# Patient Record
Sex: MALE | Race: WHITE | NOT HISPANIC OR LATINO | Employment: OTHER | URBAN - METROPOLITAN AREA
[De-identification: names, ages, dates, MRNs, and addresses within clinical notes are randomized per-mention and may not be internally consistent; named-entity substitution may affect disease eponyms.]

---

## 2021-06-22 ENCOUNTER — HOSPITAL ENCOUNTER (INPATIENT)
Facility: HOSPITAL | Age: 76
LOS: 3 days | Discharge: HOME HEALTH | DRG: 292 | End: 2021-06-25
Attending: EMERGENCY MEDICINE | Admitting: INTERNAL MEDICINE
Payer: COMMERCIAL

## 2021-06-22 ENCOUNTER — APPOINTMENT (EMERGENCY)
Dept: RADIOLOGY | Facility: HOSPITAL | Age: 76
DRG: 292 | End: 2021-06-22
Payer: COMMERCIAL

## 2021-06-22 DIAGNOSIS — I50.9 ACUTE CONGESTIVE HEART FAILURE, UNSPECIFIED HEART FAILURE TYPE (HCC): Primary | ICD-10-CM

## 2021-06-22 DIAGNOSIS — I50.33 ACUTE ON CHRONIC DIASTOLIC CHF (CONGESTIVE HEART FAILURE) (HCC): ICD-10-CM

## 2021-06-22 DIAGNOSIS — I42.9 CARDIOMYOPATHY, UNSPECIFIED TYPE (HCC): ICD-10-CM

## 2021-06-22 PROBLEM — Z95.2 S/P AVR: Status: ACTIVE | Noted: 2021-06-22

## 2021-06-22 PROBLEM — D64.9 ANEMIA: Status: ACTIVE | Noted: 2021-06-22

## 2021-06-22 PROBLEM — R06.00 DYSPNEA: Status: ACTIVE | Noted: 2021-06-22

## 2021-06-22 PROBLEM — C61 PROSTATE CANCER (HCC): Status: ACTIVE | Noted: 2021-06-22

## 2021-06-22 LAB
ALBUMIN SERPL BCP-MCNC: 3.3 G/DL (ref 3.5–5)
ALP SERPL-CCNC: 72 U/L (ref 46–116)
ALT SERPL W P-5'-P-CCNC: 34 U/L (ref 12–78)
ANION GAP SERPL CALCULATED.3IONS-SCNC: 10 MMOL/L (ref 4–13)
APTT PPP: 30 SECONDS (ref 23–37)
AST SERPL W P-5'-P-CCNC: 14 U/L (ref 5–45)
ATRIAL RATE: 133 BPM
ATRIAL RATE: 88 BPM
BASOPHILS # BLD AUTO: 0.02 THOUSANDS/ΜL (ref 0–0.1)
BASOPHILS NFR BLD AUTO: 0 % (ref 0–1)
BILIRUB SERPL-MCNC: 1.05 MG/DL (ref 0.2–1)
BUN SERPL-MCNC: 8 MG/DL (ref 5–25)
CALCIUM ALBUM COR SERPL-MCNC: 9.6 MG/DL (ref 8.3–10.1)
CALCIUM SERPL-MCNC: 9 MG/DL (ref 8.3–10.1)
CHLORIDE SERPL-SCNC: 107 MMOL/L (ref 100–108)
CO2 SERPL-SCNC: 25 MMOL/L (ref 21–32)
CREAT SERPL-MCNC: 0.84 MG/DL (ref 0.6–1.3)
EOSINOPHIL # BLD AUTO: 0.21 THOUSAND/ΜL (ref 0–0.61)
EOSINOPHIL NFR BLD AUTO: 5 % (ref 0–6)
ERYTHROCYTE [DISTWIDTH] IN BLOOD BY AUTOMATED COUNT: 16.8 % (ref 11.6–15.1)
GFR SERPL CREATININE-BSD FRML MDRD: 86 ML/MIN/1.73SQ M
GLUCOSE SERPL-MCNC: 91 MG/DL (ref 65–140)
HCT VFR BLD AUTO: 31.1 % (ref 36.5–49.3)
HGB BLD-MCNC: 9.5 G/DL (ref 12–17)
IMM GRANULOCYTES # BLD AUTO: 0.03 THOUSAND/UL (ref 0–0.2)
IMM GRANULOCYTES NFR BLD AUTO: 1 % (ref 0–2)
INR PPP: 1.05 (ref 0.84–1.19)
LYMPHOCYTES # BLD AUTO: 0.68 THOUSANDS/ΜL (ref 0.6–4.47)
LYMPHOCYTES NFR BLD AUTO: 15 % (ref 14–44)
MCH RBC QN AUTO: 25.7 PG (ref 26.8–34.3)
MCHC RBC AUTO-ENTMCNC: 30.5 G/DL (ref 31.4–37.4)
MCV RBC AUTO: 84 FL (ref 82–98)
MONOCYTES # BLD AUTO: 0.49 THOUSAND/ΜL (ref 0.17–1.22)
MONOCYTES NFR BLD AUTO: 11 % (ref 4–12)
NEUTROPHILS # BLD AUTO: 3.13 THOUSANDS/ΜL (ref 1.85–7.62)
NEUTS SEG NFR BLD AUTO: 68 % (ref 43–75)
NRBC BLD AUTO-RTO: 0 /100 WBCS
NT-PROBNP SERPL-MCNC: 5157 PG/ML
P AXIS: 49 DEGREES
PLATELET # BLD AUTO: 220 THOUSANDS/UL (ref 149–390)
PMV BLD AUTO: 10.4 FL (ref 8.9–12.7)
POTASSIUM SERPL-SCNC: 3.9 MMOL/L (ref 3.5–5.3)
PR INTERVAL: 150 MS
PROT SERPL-MCNC: 6.3 G/DL (ref 6.4–8.2)
PROTHROMBIN TIME: 13.8 SECONDS (ref 11.6–14.5)
QRS AXIS: -28 DEGREES
QRS AXIS: 90 DEGREES
QRSD INTERVAL: 158 MS
QRSD INTERVAL: 166 MS
QT INTERVAL: 354 MS
QT INTERVAL: 400 MS
QTC INTERVAL: 454 MS
QTC INTERVAL: 484 MS
RBC # BLD AUTO: 3.69 MILLION/UL (ref 3.88–5.62)
SODIUM SERPL-SCNC: 142 MMOL/L (ref 136–145)
T WAVE AXIS: 150 DEGREES
T WAVE AXIS: 267 DEGREES
TROPONIN I SERPL-MCNC: 0.03 NG/ML
TROPONIN I SERPL-MCNC: 0.04 NG/ML
TROPONIN I SERPL-MCNC: 0.04 NG/ML
VENTRICULAR RATE: 88 BPM
VENTRICULAR RATE: 99 BPM
WBC # BLD AUTO: 4.56 THOUSAND/UL (ref 4.31–10.16)

## 2021-06-22 PROCEDURE — 99223 1ST HOSP IP/OBS HIGH 75: CPT | Performed by: INTERNAL MEDICINE

## 2021-06-22 PROCEDURE — 85730 THROMBOPLASTIN TIME PARTIAL: CPT | Performed by: EMERGENCY MEDICINE

## 2021-06-22 PROCEDURE — 99222 1ST HOSP IP/OBS MODERATE 55: CPT | Performed by: INTERNAL MEDICINE

## 2021-06-22 PROCEDURE — 93005 ELECTROCARDIOGRAM TRACING: CPT

## 2021-06-22 PROCEDURE — 84484 ASSAY OF TROPONIN QUANT: CPT | Performed by: INTERNAL MEDICINE

## 2021-06-22 PROCEDURE — 71045 X-RAY EXAM CHEST 1 VIEW: CPT

## 2021-06-22 PROCEDURE — 99285 EMERGENCY DEPT VISIT HI MDM: CPT | Performed by: EMERGENCY MEDICINE

## 2021-06-22 PROCEDURE — 85610 PROTHROMBIN TIME: CPT | Performed by: EMERGENCY MEDICINE

## 2021-06-22 PROCEDURE — 93010 ELECTROCARDIOGRAM REPORT: CPT | Performed by: INTERNAL MEDICINE

## 2021-06-22 PROCEDURE — 96374 THER/PROPH/DIAG INJ IV PUSH: CPT

## 2021-06-22 PROCEDURE — 36415 COLL VENOUS BLD VENIPUNCTURE: CPT | Performed by: EMERGENCY MEDICINE

## 2021-06-22 PROCEDURE — 83880 ASSAY OF NATRIURETIC PEPTIDE: CPT | Performed by: EMERGENCY MEDICINE

## 2021-06-22 PROCEDURE — 84484 ASSAY OF TROPONIN QUANT: CPT | Performed by: EMERGENCY MEDICINE

## 2021-06-22 PROCEDURE — 99285 EMERGENCY DEPT VISIT HI MDM: CPT

## 2021-06-22 PROCEDURE — 85025 COMPLETE CBC W/AUTO DIFF WBC: CPT | Performed by: EMERGENCY MEDICINE

## 2021-06-22 PROCEDURE — 80053 COMPREHEN METABOLIC PANEL: CPT | Performed by: EMERGENCY MEDICINE

## 2021-06-22 RX ORDER — ACETAMINOPHEN 325 MG/1
650 TABLET ORAL EVERY 6 HOURS PRN
Status: DISCONTINUED | OUTPATIENT
Start: 2021-06-22 | End: 2021-06-25 | Stop reason: HOSPADM

## 2021-06-22 RX ORDER — FUROSEMIDE 20 MG/1
TABLET ORAL
COMMUNITY
Start: 2021-06-15 | End: 2021-06-25 | Stop reason: HOSPADM

## 2021-06-22 RX ORDER — FUROSEMIDE 10 MG/ML
40 INJECTION INTRAMUSCULAR; INTRAVENOUS
Status: DISCONTINUED | OUTPATIENT
Start: 2021-06-22 | End: 2021-06-25 | Stop reason: HOSPADM

## 2021-06-22 RX ORDER — FUROSEMIDE 10 MG/ML
40 INJECTION INTRAMUSCULAR; INTRAVENOUS ONCE
Status: COMPLETED | OUTPATIENT
Start: 2021-06-22 | End: 2021-06-22

## 2021-06-22 RX ORDER — CLOPIDOGREL BISULFATE 75 MG/1
TABLET ORAL
COMMUNITY
Start: 2021-06-15 | End: 2021-10-06 | Stop reason: SDUPTHER

## 2021-06-22 RX ORDER — METOPROLOL SUCCINATE 25 MG/1
25 TABLET, EXTENDED RELEASE ORAL DAILY
Status: DISCONTINUED | OUTPATIENT
Start: 2021-06-22 | End: 2021-06-25 | Stop reason: HOSPADM

## 2021-06-22 RX ORDER — ATORVASTATIN CALCIUM 40 MG/1
80 TABLET, FILM COATED ORAL
Status: DISCONTINUED | OUTPATIENT
Start: 2021-06-22 | End: 2021-06-25 | Stop reason: HOSPADM

## 2021-06-22 RX ORDER — SPIRONOLACTONE 25 MG/1
25 TABLET ORAL DAILY
Status: DISCONTINUED | OUTPATIENT
Start: 2021-06-22 | End: 2021-06-25 | Stop reason: HOSPADM

## 2021-06-22 RX ORDER — SPIRONOLACTONE 25 MG/1
TABLET ORAL
COMMUNITY
Start: 2021-06-15 | End: 2021-07-06

## 2021-06-22 RX ORDER — CLOPIDOGREL BISULFATE 75 MG/1
75 TABLET ORAL DAILY
Status: DISCONTINUED | OUTPATIENT
Start: 2021-06-22 | End: 2021-06-25 | Stop reason: HOSPADM

## 2021-06-22 RX ADMIN — SACUBITRIL AND VALSARTAN 1 TABLET: 24; 26 TABLET, FILM COATED ORAL at 18:39

## 2021-06-22 RX ADMIN — SPIRONOLACTONE 25 MG: 25 TABLET, FILM COATED ORAL at 13:26

## 2021-06-22 RX ADMIN — METOPROLOL SUCCINATE 25 MG: 25 TABLET, EXTENDED RELEASE ORAL at 16:17

## 2021-06-22 RX ADMIN — ACETAMINOPHEN 650 MG: 325 TABLET, FILM COATED ORAL at 19:35

## 2021-06-22 RX ADMIN — ENOXAPARIN SODIUM 40 MG: 40 INJECTION SUBCUTANEOUS at 13:26

## 2021-06-22 RX ADMIN — CLOPIDOGREL BISULFATE 75 MG: 75 TABLET ORAL at 13:26

## 2021-06-22 RX ADMIN — FUROSEMIDE 40 MG: 10 INJECTION, SOLUTION INTRAMUSCULAR; INTRAVENOUS at 10:30

## 2021-06-22 RX ADMIN — ATORVASTATIN CALCIUM 80 MG: 40 TABLET, FILM COATED ORAL at 16:16

## 2021-06-22 RX ADMIN — FUROSEMIDE 40 MG: 10 INJECTION, SOLUTION INTRAMUSCULAR; INTRAVENOUS at 16:17

## 2021-06-22 NOTE — H&P
[facility]  H&PRebeca Renate Free 1945, 76 y o  male MRN: [id number]  Unit/Bed#: ED 15 Encounter: [id number]  Primary Care Provider: No primary care provider on file  Date and time admitted to hospital: 6/22/2021  8:31 AM    * Acute on chronic diastolic CHF (congestive heart failure) (HCC)  Assessment & Plan  Wt Readings from Last 3 Encounters:   06/22/21 104 kg (229 lb 4 5 oz)     No echo on our system  Reported EF of 20 % by ED  Will get repeat Echo   Lasix 40 mg BID  Cardiology consult   I/O  Daily weights          Anemia  Assessment & Plan  In the setting of metastatic cancer  Will check ferritin, TIBC  Transfuse as needed  S/P AVR  Assessment & Plan  S/p AVR in [place] more than a week ago  Did not receive his DC meds until 2 days ago  Will get records from [place]  Dyspnea  Assessment & Plan  In the setting of HF  Will treat with lasix and monitor response  Trend troponins as well  And monitor on telemetry for first 24 hours to rule out anginal equivalent  Prostate cancer [facility])  Assessment & Plan  Was receiving chemo every 4 weeks in [place] at [place]  Would like to establish care here  Will discuss with [facility] heme onc  Will get records from [place]  VTE Prophylaxis: Heparin  / sequential compression device   Code Status: Full Code  POLST: There is no POLST form on file for this patient (pre-hospital)  Discussion with family: Discussed with patient and with daughter at bedside    Anticipated Length of Stay:  Patient will be admitted on an Inpatient basis with an anticipated length of stay of  more than 2 midnights  Justification for Hospital Stay:  Acute CHF    Total Time for Visit, including Counseling / Coordination of Care: 45 minutes  Greater than 50% of this total time spent on direct patient counseling and coordination of care      Chief Complaint:   Shortness of breath    History of Present Illness:    Jules Barry is a 76 y o  male with metastatic prostate cancer, status post aortic valve replacement just over week ago, who presents worsening dyspnea over the last 2-3 days acutely worse today  His daughter states that he was discharged from [facility] says following his valve replacement but that his prescriptions including Lasix were sent to remote pharmacy and did not arrive until several days after his procedure  For this reason use not been taking all of his medication  He also reports 5 lb weight gain but no chest pain currently although he does note chest pain on exertion at times  He reports some mild leg swelling as well  With regard to his prostate cancer he was receiving chemotherapy every 4 weeks in [place] at [facility] until he started exhibiting symptoms of shortness of breath 3 weeks ago and was referred for aortic valve replacement  Since then he has not had any treatment  Overall the patient is looking for as much care to be transitioned to [facility] here in the [facility] including cancer therapy    Review of Systems:    Review of Systems   Constitutional: Negative for activity change, appetite change, chills, diaphoresis, fatigue, fever and unexpected weight change  HENT: Negative  Negative for congestion and drooling  Respiratory: Positive for chest tightness and shortness of breath  Cardiovascular: Positive for leg swelling  Gastrointestinal: Negative  Endocrine: Negative  Genitourinary: Negative  Musculoskeletal: Negative  Neurological: Negative  Hematological: Negative  Psychiatric/Behavioral: Negative  Past Medical and Surgical History:     Past Medical History:   Diagnosis Date    High cholesterol     Prostate cancer [facility])        Past Surgical History:   Procedure Laterality Date    CARDIAC SURGERY         Meds/Allergies:    Prior to Admission medications    Medication Sig Start Date End Date Taking?  Authorizing Provider   clopidogrel (PLAVIX) 75 mg tablet TAKE ONE TABLET BY MOUTH DAILY AS BLOOD THINNER 6/15/21  Yes Historical Provider, MD   furosemide (LASIX) 20 mg tablet TAKE ONE TABLET BY MOUTH DAILY TO PREVENT FLUID RETENTION 6/15/21  Yes Historical Provider, MD   spironolactone (ALDACTONE) 25 mg tablet TAKE ONE-HALF TABLET BY MOUTH DAILY FOR FLUID RETENTION OR HEART 6/15/21  Yes Historical Provider, MD     I have reviewed home medications with patient personally  Allergies: Not on File    Social History:     Marital Status:    Occupation:  Retired/better  Patient Pre-hospital Living Situation:  Lives with son  Patient Pre-hospital Level of Mobility:  Cane  Patient Pre-hospital Diet Restrictions:  None  Substance Use History:   Social History     Substance and Sexual Activity   Alcohol Use Not Currently     Social History     Tobacco Use   Smoking Status Former Smoker   Smokeless Tobacco Never Used     Social History     Substance and Sexual Activity   Drug Use Not on file       Family History:    History reviewed  No pertinent family history  Physical Exam:     Vitals:   Blood Pressure: 121/67 (06/22/21 0900)  Pulse: 90 (06/22/21 0900)  Temperature: 98 9 °F (37 2 °C) (06/22/21 0830)  Temp Source: Oral (06/22/21 0830)  Respirations: 20 (06/22/21 0900)  Height: 5' 7" (170 2 cm) (06/22/21 0830)  Weight - Scale: 104 kg (229 lb 4 5 oz) (06/22/21 0830)  SpO2: 95 % (06/22/21 0900)    Physical Exam  Constitutional:       General: He is not in acute distress  Appearance: He is not ill-appearing, toxic-appearing or diaphoretic  HENT:      Head: Normocephalic  Right Ear: Tympanic membrane normal  There is no impacted cerumen  Left Ear: There is no impacted cerumen  Nose: Nose normal       Mouth/Throat:      Mouth: Mucous membranes are moist       Pharynx: No oropharyngeal exudate or posterior oropharyngeal erythema  Eyes:      General: No scleral icterus  Right eye: No discharge  Left eye: No discharge        Pupils: Pupils are equal, round, and reactive to light  Cardiovascular:      Rate and Rhythm: Normal rate  Heart sounds: No murmur heard  No friction rub  No gallop  Pulmonary:      Effort: Pulmonary effort is normal  No respiratory distress (mild)  Breath sounds: No stridor  No wheezing, rhonchi or rales  Chest:      Chest wall: No tenderness  Abdominal:      General: Abdomen is flat  There is no distension  Palpations: There is no mass  Tenderness: There is no abdominal tenderness  There is no right CVA tenderness, left CVA tenderness, guarding or rebound  Hernia: No hernia is present  Musculoskeletal:         General: No swelling, tenderness, deformity or signs of injury  Normal range of motion  Right lower leg: No edema  Left lower leg: No edema  Skin:     Capillary Refill: Capillary refill takes less than 2 seconds  Coloration: Skin is pale  Skin is not jaundiced  Findings: No bruising, erythema, lesion or rash  Neurological:      General: No focal deficit present  Mental Status: He is alert  Cranial Nerves: No cranial nerve deficit  Sensory: No sensory deficit  Motor: No weakness  Coordination: Coordination normal       Gait: Gait normal       Deep Tendon Reflexes: Reflexes normal    Psychiatric:         Mood and Affect: Mood normal              Additional Data:     Lab Results: I have personally reviewed pertinent reports        Results from last 7 days   Lab Units 06/22/21  0926   WBC Thousand/uL 4 56   HEMOGLOBIN g/dL 9 5*   HEMATOCRIT % 31 1*   PLATELETS Thousands/uL 220   NEUTROS PCT % 68   LYMPHS PCT % 15   MONOS PCT % 11   EOS PCT % 5     Results from last 7 days   Lab Units 06/22/21  0926   SODIUM mmol/L 142   POTASSIUM mmol/L 3 9   CHLORIDE mmol/L 107   CO2 mmol/L 25   BUN mg/dL 8   CREATININE mg/dL 0 84   ANION GAP mmol/L 10   CALCIUM mg/dL 9 0   ALBUMIN g/dL 3 3*   TOTAL BILIRUBIN mg/dL 1 05*   ALK PHOS U/L 72   ALT U/L 34 AST U/L 14   GLUCOSE RANDOM mg/dL 91     Results from last 7 days   Lab Units 06/22/21  0926   INR  1 05                   Imaging: I have personally reviewed pertinent reports  XR chest 1 view portable   Final Result by Tavia Hollins MD (06/22 1011)      No acute cardiopulmonary disease  Workstation performed: [id number]             EKG, Pathology, and Other Studies Reviewed on Admission:   · EKG:  No acute ischemia    Allscripts / Epic Records Reviewed: Yes     ** Please Note: This note has been constructed using a voice recognition system   **

## 2021-06-22 NOTE — ASSESSMENT & PLAN NOTE
In the setting of HF  Will treat with lasix and monitor response  Trend troponins as well  And monitor on telemetry for first 24 hours to rule out anginal equivalent

## 2021-06-22 NOTE — ED ATTENDING ATTESTATION
6/22/2021  I, Romel Almendarez DO, saw and evaluated the patient  I have discussed the patient with the resident/non-physician practitioner and agree with the resident's/non-physician practitioner's findings, Plan of Care, and MDM as documented in the resident's/non-physician practitioner's note, except where noted  All available labs and Radiology studies were reviewed  I was present for key portions of any procedure(s) performed by the resident/non-physician practitioner and I was immediately available to provide assistance  At this point I agree with the current assessment done in the Emergency Department  I have conducted an independent evaluation of this patient a history and physical is as follows:      51-year-old male shortness of breath , recently discharged from hospital in [place] where he had aortic valve replacement period, unfortunate was unable to  medications for 4 days  , today noticed swelling of arms legs, and dyspnea with exertion  Can not more more than 2 ft without severe shortness of breath , not hypoxic here but with minimal exertion he becomes tachypneic, no conversational tachypnea  , lungs with rales at the bases  Given 40 mg of IV Lasix  Admitted to Internal Medicine Service            ED Course         Critical Care Time  Procedures

## 2021-06-22 NOTE — PLAN OF CARE
Problem: MOBILITY - ADULT  Goal: Maintain or return to baseline ADL function  Description: INTERVENTIONS:  -  Assess patient's ability to carry out ADLs; assess patient's baseline for ADL function and identify physical deficits which impact ability to perform ADLs (bathing, care of mouth/teeth, toileting, grooming, dressing, etc )  - Assess/evaluate cause of self-care deficits   - Assess range of motion  - Assess patient's mobility; develop plan if impaired  - Assess patient's need for assistive devices and provide as appropriate  - Encourage maximum independence but intervene and supervise when necessary  - Involve family in performance of ADLs  - Assess for home care needs following discharge   - Consider OT consult to assist with ADL evaluation and planning for discharge  - Provide patient education as appropriate  Outcome: Progressing  Goal: Maintains/Returns to pre admission functional level  Description: INTERVENTIONS:  - Perform BMAT or MOVE assessment daily    - Set and communicate daily mobility goal to care team and patient/family/caregiver  - Collaborate with rehabilitation services on mobility goals if consulted  - Perform Range of Motion  times a day  - Reposition patient every 2 hours    - Dangle patient 2 times a day  - Stand patient 2 times a day  - Ambulate patient 2 times a day  - Out of bed to chair 2 times a day   - Out of bed for meals 2 times a day  - Out of bed for toileting  - Record patient progress and toleration of activity level   Outcome: Progressing     Problem: Potential for Falls  Goal: Patient will remain free of falls  Description: INTERVENTIONS:  - Educate patient/family on patient safety including physical limitations  - Instruct patient to call for assistance with activity   - Consult OT/PT to assist with strengthening/mobility   - Keep Call bell within reach  - Keep bed low and locked with side rails adjusted as appropriate  - Keep care items and personal belongings within reach  - Initiate and maintain comfort rounds  - Make Fall Risk Sign visible to staff  - Offer Toileting every 2 Hours, in advance of need  - Initiate/Maintain BED Alarm  - Obtain necessary fall risk management equipment: bed  - Apply yellow socks and bracelet for high fall risk patients  - Consider moving patient to room near nurses station  Outcome: Progressing     Problem: Prexisting or High Potential for Compromised Skin Integrity  Goal: Skin integrity is maintained or improved  Description: INTERVENTIONS:  - Identify patients at risk for skin breakdown  - Assess and monitor skin integrity  - Assess and monitor nutrition and hydration status  - Monitor labs   - Assess for incontinence   - Turn and reposition patient  - Assist with mobility/ambulation  - Relieve pressure over bony prominences  - Avoid friction and shearing  - Provide appropriate hygiene as needed including keeping skin clean and dry  - Evaluate need for skin moisturizer/barrier cream  - Collaborate with interdisciplinary team   - Patient/family teaching  - Consider wound care consult   Outcome: Progressing

## 2021-06-22 NOTE — ASSESSMENT & PLAN NOTE
S/p AVR in [address] [place] [address] more than a week ago  Did not receive his DC meds until 2 days ago  Will get records from [place]

## 2021-06-22 NOTE — ASSESSMENT & PLAN NOTE
Was receiving chemo every 4 weeks in [place] at [place]  Would like to establish care here  Will discuss with [facility] onc  Will get records from [place]

## 2021-06-22 NOTE — PLAN OF CARE
Problem: MOBILITY - ADULT  Goal: Maintain or return to baseline ADL function  Description: INTERVENTIONS:  -  Assess patient's ability to carry out ADLs; assess patient's baseline for ADL function and identify physical deficits which impact ability to perform ADLs (bathing, care of mouth/teeth, toileting, grooming, dressing, etc )  - Assess/evaluate cause of self-care deficits   - Assess range of motion  - Assess patient's mobility; develop plan if impaired  - Assess patient's need for assistive devices and provide as appropriate  - Encourage maximum independence but intervene and supervise when necessary  - Involve family in performance of ADLs  - Assess for home care needs following discharge   - Consider OT consult to assist with ADL evaluation and planning for discharge  - Provide patient education as appropriate  Outcome: Progressing  Goal: Maintains/Returns to pre admission functional level  Description: INTERVENTIONS:  - Perform BMAT or MOVE assessment daily    - Set and communicate daily mobility goal to care team and patient/family/caregiver  - Collaborate with rehabilitation services on mobility goals if consulted  - Perform Range of Motion 3 times a day  - Reposition patient every 2 hours    - Ambulate patient 3 times a day  - Out of bed to [place] times a day   - Out of bed for meals 3 times a day  - Out of bed for toileting  - Record patient progress and toleration of activity level   Outcome: Progressing     Problem: Potential for Falls  Goal: Patient will remain free of falls  Description: INTERVENTIONS:  - Educate patient/family on patient safety including physical limitations  - Instruct patient to call for assistance with activity   - Consult OT/PT to assist with strengthening/mobility   - Keep Call bell within reach  - Keep bed low and locked with side rails adjusted as appropriate  - Keep care items and personal belongings within reach  - Initiate and maintain comfort rounds  - Make Fall Risk Sign visible to staff  - Offer Toileting every Hours, in advance of need  - Initiate/Maintain alar m  - Obtain necessary fall risk management equipment:walker  - Apply yellow socks and bracelet for high fall risk patients  - Consider moving patient to room near nurses station  Outcome: Progressing     Problem: Prexisting or High Potential for Compromised Skin Integrity  Goal: Skin integrity is maintained or improved  Description: INTERVENTIONS:  - Identify patients at risk for skin breakdown  - Assess and monitor skin integrity  - Assess and monitor nutrition and hydration status  - Monitor labs   - Assess for incontinence   - Turn and reposition patient  - Assist with mobility/ambulation  - Relieve pressure over bony prominences  - Avoid friction and shearing  - Provide appropriate hygiene as needed including keeping skin clean and dry  - Evaluate need for skin moisturizer/barrier cream  - Collaborate with interdisciplinary team   - Patient/family teaching  - Consider wound care consult   Outcome: Progressing

## 2021-06-22 NOTE — CONSULTS
Consultation - Cardiology Team One  Mary Rene 76 y o  male MRN: [id number]  Unit/Bed#: ED 15 Encounter: [id number]    Inpatient consult to Cardiology  Consult performed by: OMAIRA Rodriguez  Consult ordered by: Neida Guzmán MD      Physician Requesting Consult: Neida Guzmán MD  Reason for Consult / Principal Problem:  CHF      Assessment/ Plan    1  Acute on chronic combined systolic and diastolic heart failure  ProBNP 5157  Chest x-ray reviewed showing no acute cardiopulmonary disease  On furosemide 40 mg IV b i d   Monitor I&Os  Daily weights  Continue 2 g sodium diet with 1800 mL fluid restriction    2  Status post TAVR 06/14/2021  On Plavix    3  Coronary artery disease with history of PCI/stenting to circumflex RCA in 2001  Restart statin and beta-blocker    4  Cardiomyopathy likely ischemic  EF 20%  Restart home medications:  metoprolol XL 25 mg p o  Daily and spironolactone 12 5 mg p o  Daily  Will start Entresto  Check echocardiogram  Likely will need life vest prior to discharge      History of Present Illness   HPI: Tamica Garcia is a 76y o  year old male who has aortic stenosis now status post TAVR 06/14/2021, chronic combined systolic and diastolic heart failure, coronary artery disease with PCI/stenting to circumflex and RCA in 2001, atach ablation in 2018, metastatic prostate cancer, asthma, BPH, GERD and hernia repair  He currently does not follow with a cardiologist      He presents to [facility] ER 06/22/2021 with complaint of shortness of breath  Patient was recently admitted to [facility] for CHF and aortic stenosis  He had TAVR 06/14/2021  He was discharged home 06/19/2021 but did not receive medications until Saturday  He reports shortness of breath starting about 3 days ago but progressively worsening  ProBNP on admission was 5000 chest x-ray showed no acute cardiopulmonary disease  Cardiology was consulted for acute on chronic CHF  He was started on IV diuretics       Echocardiogram 6/14/2021 showed LV EF 20%, entire apex, mid and apical anterior septum, mid and apical inferior septum, mid and apical inferior wall, mid inferior lateral wall and mid lateral wall are akinetic and bioprosthetic aortic valve with trace paravalvular aortic regurgitation  Cardiac cath 6/9/21 was noted "diffuse moderate prox and mid disease/ISR, severe OM lesion and total mid LAD " No official report in Care everywhere  EKG reviewed personally:  Normal sinus rhythm left bundle-branch block  Ventricular rate 80 beats per minute  DC interval 150 milliseconds  QRS D 158 milliseconds  QT interval 400 milliseconds  QTC interval 484 milliseconds    Telemetry reviewed personally:   Normal sinus rhythm heart rate 80s    Review of Systems   Constitutional: Positive for malaise/fatigue and weight gain  Negative for chills and fever  Cardiovascular: Positive for dyspnea on exertion, leg swelling and orthopnea  Negative for chest pain and palpitations  Respiratory: Positive for shortness of breath  Negative for wheezing  Musculoskeletal: Negative for falls  Gastrointestinal: Positive for bloating  Negative for nausea and vomiting  Neurological: Negative for dizziness and light-headedness  Psychiatric/Behavioral: Negative for altered mental status  All other systems reviewed and are negative  Historical Information   Past Medical History:   Diagnosis Date    High cholesterol     Prostate cancer [facility])      Past Surgical History:   Procedure Laterality Date    CARDIAC SURGERY       Social History     Substance and Sexual Activity   Alcohol Use Not Currently     Social History     Substance and Sexual Activity   Drug Use Not on file     Social History     Tobacco Use   Smoking Status Former Smoker   Smokeless Tobacco Never Used     Family History: History reviewed  No pertinent family history      Meds/Allergies   all current active meds have been reviewed and current meds:   Current Facility-Administered Medications   Medication Dose Route Frequency    clopidogrel (PLAVIX) tablet 75 mg  75 mg Oral Daily    furosemide (LASIX) injection 40 mg  40 mg Intravenous BID (diuretic)    spironolactone (ALDACTONE) tablet 25 mg  25 mg Oral Daily          Not on File    Objective   Vitals: Blood pressure 121/67, pulse 90, temperature 98 9 °F (37 2 °C), temperature source Oral, resp  rate 20, height 5' 7" (1 702 m), weight 104 kg (229 lb 4 5 oz), SpO2 95 %  ,     Body mass index is 35 91 kg/m²  ,     Systolic (87DHC), OEV:421 , Min:120 , ZGM:024     Diastolic (69SYZ), VWZ:93, Min:65, Max:67          No intake or output data in the 24 hours ending 06/22/21 1205  Weight (last 2 days)     Date/Time   Weight    06/22/21 0830   104 (229 28)            Invasive Devices     Peripheral Intravenous Line            Peripheral IV 06/22/21 Distal;Right;Upper;Ventral (anterior) Arm <1 day              Physical Exam  Constitutional:       General: He is not in acute distress  Appearance: He is obese  HENT:      Head: Normocephalic  Mouth/Throat:      Mouth: Mucous membranes are moist    Cardiovascular:      Rate and Rhythm: Normal rate  Pulses: Normal pulses  Heart sounds: Murmur heard  Pulmonary:      Effort: Pulmonary effort is normal  No respiratory distress  Comments: Decreased in the bases  Room air  Abdominal:      General: Bowel sounds are normal       Palpations: Abdomen is soft  Musculoskeletal:         General: Swelling present  Normal range of motion  Cervical back: Neck supple  Comments: +2 pitting edema bilateral lower extremities   Skin:     General: Skin is warm and dry  Capillary Refill: Capillary refill takes less than 2 seconds  Coloration: Skin is pale  Neurological:      General: No focal deficit present  Mental Status: He is alert and oriented to person, place, and time     Psychiatric:         Mood and Affect: Mood normal            LABORATORY RESULTS:  Results from last 7 days   Lab Units 06/22/21  0926   TROPONIN I ng/mL 0 04     CBC with diff:   Results from last 7 days   Lab Units 06/22/21  0926   WBC Thousand/uL 4 56   HEMOGLOBIN g/dL 9 5*   HEMATOCRIT % 31 1*   MCV fL 84   PLATELETS Thousands/uL 220   MCH pg 25 7*   MCHC g/dL 30 5*   RDW % 16 8*   MPV fL 10 4   NRBC AUTO /100 WBCs 0       CMP:  Results from last 7 days   Lab Units 06/22/21  0926   POTASSIUM mmol/L 3 9   CHLORIDE mmol/L 107   CO2 mmol/L 25   BUN mg/dL 8   CREATININE mg/dL 0 84   CALCIUM mg/dL 9 0   AST U/L 14   ALT U/L 34   ALK PHOS U/L 72   EGFR ml/min/1 73sq m 86       BMP:  Results from last 7 days   Lab Units 06/22/21  0926   POTASSIUM mmol/L 3 9   CHLORIDE mmol/L 107   CO2 mmol/L 25   BUN mg/dL 8   CREATININE mg/dL 0 84   CALCIUM mg/dL 9 0          Lab Results   Component Value Date    NTBNP 5,157 (H) 06/22/2021         Results from last 7 days   Lab Units 06/22/21  0926   INR  1 05     Lipid Profile:   No results found for: CHOL  No results found for: HDL  No results found for: LDLCALC  No results found for: TRIG      Cardiac testing:   No results found for this or any previous visit  No results found for this or any previous visit  No valid procedures specified  No results found for this or any previous visit  Imaging:   XR chest 1 view portable    Result Date: 6/22/2021  Narrative: CHEST INDICATION:   sob  COMPARISON:  None EXAM PERFORMED/VIEWS:  XR CHEST PORTABLE FINDINGS:  There is a right-sided chest port with tip at the cavoatrial junction  Status post aortic valvular replacement  Cardiomediastinal silhouette appears unremarkable  No focal airspace consolidation  No pneumothorax or pleural effusion  Osseous structures appear within normal limits for patient age  Impression: No acute cardiopulmonary disease  Workstation performed: [id number]     Thank you for allowing us to participate in this patient's care      Counseling / Coordination of Care  Total floor / unit time spent today 45 minutes  Greater than 50% of total time was spent with the patient and / or family counseling and / or coordination of care  A description of the counseling / coordination of care: Review of history, current assessment, development of a plan  Code Status: No Order    ** Please Note: Dragon 360 Dictation voice to text software may have been used in the creation of this document   **

## 2021-06-22 NOTE — ASSESSMENT & PLAN NOTE
Wt Readings from Last 3 Encounters:   06/22/21 104 kg (229 lb 4 5 oz)     No echo on our system  Reported EF of 20 % by ED  Will get repeat Echo   Lasix 40 mg BID     Cardiology consult   I/O  Daily weights

## 2021-06-22 NOTE — ED NOTES
Per Patient's daughter, patient is complaining of bilateral foot numbness for several years, worsening lately when he lays down  Dr Homa Nair made aware        Lazaro Concepcion RN  06/22/21 2561

## 2021-06-22 NOTE — ED PROVIDER NOTES
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 on chemotherapy q21d presents with swelling of his extremities and shortness of breath  Mentions that after his procedure he did not get his prescribed medications until 4 days after  He then reports that last Sunday he started experiencing upper and lower extremity swelling and today he started experiencing dyspnea when ambulating to his car  During my evaluation he started complaining of chest tightness and 5/10 pain w/o radiation  He reports that his weight when leaving the hospital was 224 lb, but today he presents at 229 lb  He mentions that these symptoms tend to occur after his chemo dose  History provided by:  Patient and relative      Prior to Admission Medications   Prescriptions Last Dose Informant Patient Reported? Taking? clopidogrel (PLAVIX) 75 mg tablet   Yes Yes   Sig: TAKE ONE TABLET BY MOUTH DAILY AS BLOOD THINNER   furosemide (LASIX) 20 mg tablet   Yes Yes   Sig: TAKE ONE TABLET BY MOUTH DAILY TO PREVENT FLUID RETENTION   spironolactone (ALDACTONE) 25 mg tablet   Yes Yes   Sig: TAKE ONE-HALF TABLET BY MOUTH DAILY FOR FLUID RETENTION OR HEART      Facility-Administered Medications: None       Past Medical History:   Diagnosis Date    High cholesterol     Prostate [facility])        Past Surgical History:   Procedure Laterality Date    CARDIAC SURGERY         History reviewed  No pertinent family history  I have reviewed and agree with the history as documented      E-Cigarette/Vaping     E-Cigarette/Vaping Substances     Social History     Tobacco Use    Smoking status: Former Smoker    Smokeless tobacco: Never Used   Substance Use Topics    Alcohol use: Not Currently    Drug use: Not on file        Review of Systems   Constitutional: Positive for activity change  Negative for chills and fever  HENT: Negative  Eyes: Negative  Respiratory: Positive for chest tightness and shortness of breath  Negative for cough  Cardiovascular: Positive for chest pain and leg swelling  Negative for palpitations  Gastrointestinal: Negative for abdominal distention and abdominal pain  Endocrine: Negative  Genitourinary: Negative  Musculoskeletal: Positive for back pain  Skin: Positive for color change (R elbow)  Allergic/Immunologic: Negative  Neurological: Negative  Hematological: Negative  Psychiatric/Behavioral: Negative  Physical Exam  ED Triage Vitals [06/22/21 0830]   Temperature Pulse Respirations Blood Pressure SpO2   98 9 °F (37 2 °C) 97 (!) 28 120/65 97 %      Temp Source Heart Rate Source Patient Position - Orthostatic VS BP Location FiO2 (%)   Oral Monitor Lying Left arm --      Pain Score       --             Orthostatic Vital Signs  Vitals:    06/22/21 0830 06/22/21 0900   BP: 120/65 121/67   Pulse: 97 90   Patient Position - Orthostatic VS: Lying        Physical Exam  Vitals and nursing note reviewed  Constitutional:       General: He is awake  Appearance: He is well-developed  He is obese  He is ill-appearing  HENT:      Head: Normocephalic and atraumatic  Eyes:      Conjunctiva/sclera: Conjunctivae normal    Cardiovascular:      Rate and Rhythm: Normal rate and regular rhythm  Heart sounds: No murmur heard  Pulmonary:      Effort: Pulmonary effort is normal  No respiratory distress  Breath sounds: Decreased breath sounds, wheezing and rhonchi present  Abdominal:      Palpations: Abdomen is soft  Tenderness: There is no abdominal tenderness  Musculoskeletal:      Cervical back: Neck supple        Right lower leg: Edema present  Left lower leg: Edema present  Skin:     General: Skin is warm and dry  Findings: Erythema (R elbow) present  Neurological:      Mental Status: He is alert           ED Medications  Medications   furosemide (LASIX) injection 40 mg (40 mg Intravenous Given 6/22/21 1030)       Diagnostic Studies  Results Reviewed     Procedure Component Value Units Date/Time    Troponin I [[id number]]     Lab Status: No result Specimen: Blood     NT-BNP PRO [[id number]]  (Abnormal) Collected: 06/22/21 0926    Lab Status: Final result Specimen: Blood from Arm, Right Updated: 06/22/21 0959     NT-proBNP 5,157 pg/mL     Troponin I [[id number]]  (Normal) Collected: 06/22/21 0926    Lab Status: Final result Specimen: Blood from Arm, Right Updated: 06/22/21 0955     Troponin I 0 04 ng/mL     Comprehensive metabolic panel [[id number]]  (Abnormal) Collected: 06/22/21 0926    Lab Status: Final result Specimen: Blood from Arm, Right Updated: 06/22/21 0951     Sodium 142 mmol/L      Potassium 3 9 mmol/L      Chloride 107 mmol/L      CO2 25 mmol/L      ANION GAP 10 mmol/L      BUN 8 mg/dL      Creatinine 0 84 mg/dL      Glucose 91 mg/dL      Calcium 9 0 mg/dL      Corrected Calcium 9 6 mg/dL      AST 14 U/L      ALT 34 U/L      Alkaline Phosphatase 72 U/L      Total Protein 6 3 g/dL      Albumin 3 3 g/dL      Total Bilirubin 1 05 mg/dL      eGFR 86 ml/min/1 73sq m     Narrative:      Courtney guidelines for Chronic Kidney Disease (CKD):     Stage 1 with normal or high GFR (GFR > 90 mL/min/1 73 square meters)    Stage 2 Mild CKD (GFR = 60-89 mL/min/1 73 square meters)    Stage 3A Moderate CKD (GFR = 45-59 mL/min/1 73 square meters)    Stage 3B Moderate CKD (GFR = 30-44 mL/min/1 73 square meters)    Stage 4 Severe CKD (GFR = 15-29 mL/min/1 73 square meters)    Stage 5 End Stage CKD (GFR <15 mL/min/1 73 square meters)  Note: GFR calculation is accurate only with a steady state creatinine Protime-INR [[id number]]  (Normal) Collected: 06/22/21 0926    Lab Status: Final result Specimen: Blood from Arm, Right Updated: 06/22/21 0945     Protime 13 8 seconds      INR 1 05    APTT [[id number]]  (Normal) Collected: 06/22/21 0926    Lab Status: Final result Specimen: Blood from Arm, Right Updated: 06/22/21 0945     PTT 30 seconds     CBC and differential [[id number]]  (Abnormal) Collected: 06/22/21 0926    Lab Status: Final result Specimen: Blood from Arm, Right Updated: 06/22/21 0933     WBC 4 56 Thousand/uL      RBC 3 69 Million/uL      Hemoglobin 9 5 g/dL      Hematocrit 31 1 %      MCV 84 fL      MCH 25 7 pg      MCHC 30 5 g/dL      RDW 16 8 %      MPV 10 4 fL      Platelets 482 Thousands/uL      nRBC 0 /100 WBCs      Neutrophils Relative 68 %      Immat GRANS % 1 %      Lymphocytes Relative 15 %      Monocytes Relative 11 %      Eosinophils Relative 5 %      Basophils Relative 0 %      Neutrophils Absolute 3 13 Thousands/µL      Immature Grans Absolute 0 03 Thousand/uL      Lymphocytes Absolute 0 68 Thousands/µL      Monocytes Absolute 0 49 Thousand/µL      Eosinophils Absolute 0 21 Thousand/µL      Basophils Absolute 0 02 Thousands/µL                  XR chest 1 view portable   Final Result by Matti Guzman MD (06/22 1011)      No acute cardiopulmonary disease                    Workstation performed: [id number]               Procedures  ECG 12 Lead Documentation Only    Date/Time: 6/22/2021 10:59 AM  Performed by: Dandre Swenson MD  Authorized by: Dandre Swenson MD     Indications / Diagnosis:  SOB  ECG reviewed by me, the ED Provider: yes    Patient location:  ED  Previous ECG:     Previous ECG:  Compared to current    Comparison ECG info:  Sinus rhythm replaced A-fib    Similarity:  Changes noted    Comparison to cardiac monitor: No    Interpretation:     Interpretation: abnormal    Rate:     ECG rate:  88    ECG rate assessment: normal    Rhythm:     Rhythm: sinus rhythm    QRS: QRS axis:  Left    QRS intervals: Wide  ST segments:     ST segments:  Abnormal    ST segment elevation noted on lead: Anterior leads  T waves:     T waves: inverted      T waves comment:  Anterolateral leads          ED Course     Imaging and lab work consistent with volume overload  A dose of 40 mg IV Lasix was given  Will be admitted for further evaluation and management  Initial Sepsis Screening     Row Name 06/22/21 1046                Is the patient's history suggestive of a new or worsening infection? No  -EP        Suspected source of infection  --        Are two or more of the following signs & symptoms of infection both present and new to the patient? No  -EP        Indicate SIRS criteria  --        If the answer is yes to both questions, suspicion of sepsis is present  --        If severe sepsis is present AND tissue hypoperfusion perists in the hour after fluid resuscitation or lactate > 4, the patient meets criteria for SEPTIC SHOCK  --        Are any of the following organ dysfunction criteria present within 6 hours of suspected infection and SIRS criteria that are NOT considered to be chronic conditions?   --        Organ dysfunction  --        Date of presentation of severe sepsis  --        Time of presentation of severe sepsis  --        Tissue hypoperfusion persists in the hour after crystalloid fluid administration, evidenced, by either:  --        Was hypotension present within one hour of the conclusion of crystalloid fluid administration?  --        Date of presentation of septic shock  --        Time of presentation of septic shock  --          User Key  (r) = Recorded By, (t) = Taken By, (c) = Cosigned By    [address] Name Provider Juaquin Smith MD Physician                    MDM  Number of Diagnoses or Management Options  Acute congestive heart failure, unspecified heart failure type [facility])  Diagnosis management comments: 75 yo M presents with shortness of breath and swelling of upper and lower extremities  BNP noted at 5K, CXR showing congestion  A dose of Lasix 40mg IV was given  EKG showing LBBB  Will be admitted with inpatient status for further evaluation and management  Amount and/or Complexity of Data Reviewed  Clinical lab tests: ordered  Tests in the radiology section of CPT®: ordered  Review and summarize past medical records: yes        Disposition  Final diagnoses:   Acute congestive heart failure, unspecified heart failure type ([facility] [address] )     Time reflects when diagnosis was documented in both MDM as applicable and the Disposition within this note     Time User Action Codes Description Comment    6/22/2021 10:35 AM Gurdeep Dorman Add [I50 9] Acute congestive heart failure, unspecified heart failure type [facility])       ED Disposition     ED Disposition Condition Date/Time Comment    Admit Stable Tue Jun 22, 2021 10:33 AM Case was discussed with Dr Shelly Tomas and the patient's admission status was agreed to be Admission Status: inpatient status to the service of Dr Shelly Tomas   Follow-up Information    None         Patient's Medications   Discharge Prescriptions    No medications on file     No discharge procedures on file  PDMP Review     None           ED Provider  Attending physically available and evaluated Royce Rene I managed the patient along with the ED Attending      Electronically Signed by         Kari Galvan MD  06/22/21 6914

## 2021-06-22 NOTE — ASSESSMENT & PLAN NOTE
In the setting of metastatic cancer  Will check ferritin, TIBC  Transfuse as needed  PULMONARY / CRITICAL CARE PROGRESS NOTE    Date / Time of Admission:  1/9/2017  7:44 AM    Assessment:   Principal Problem:    Shock circulatory  Active Problems:    Hemorrhage, peritoneal    Acute blood loss anemia    Lactic acidosis    Alcohol withdrawal, uncomplicated    Bleeding mesenteric varix  Coagulopathy  Liver cirrhosis  Hemorrhagic shock  Alcohol withdrawal with delirium  RLL nodule, 5mm       Advance Directives:  full      Plan:   Neuro:  Sedated with precedex. Add fentanyl  Schedule ativan    CVS:  Post of hypotension due to hemorrhagic shock  Follow hgb and transfuse as needed to keep hgb>7  On norepi, vaso and shanna at the moment. Will wean as tolerated  NS at 100    Pulm:  On MV post op. No plan for weaning  STAT ABG    GI:  Alcoholic liver cirrhosis. Bleeding mesenteric varix with hemoperitoneum  Post op now, bleeding is controlled  Change abx to ertapenem since there was a question of diverticulitis   Protonix IV    :  MODESTA. Received contrast 2 days ago for CTA and angiography but probably just prerenal due to hemorrhage.  Starting to make good urine. Continue NS at 100    Hem:  Hgb every 4 hours, keep > 7  Coagulopathy - received 4 units FFP, Kcentra and vitK.  Fibrinogen is above 100  4 PRBC's overnight and 7 units in the OR. 2 doses of calcium            ICU DAILY CHECKLIST                           Can patient transfer out of MICU? no    FAST HUG:    Feeding:  Feeding: No.  Patient is receiving NPO    Saeed:Yes  Analgesia/Sedation:Yes fentanyl and precedex  Thromboembolic prophylaxis: yes; Mode:  SCDs  HOB>30:  Yes  Stress Ulcer Protocol Active: yes; Mode: PPI  Glycemic Control: Any glucose > 180 no; Mode of Insulin Therapy: Sliding Scale Insulin    INTUBATED:  Can patient have daily waking:  no  Can patient have spontaneous breathing trial:  no    Restraints? yes    PHYSICAL THERAPY AND MOBILITY:  Can patient have PT and mobility trial: no  Activity: Bed Rest    Critical Care Time greater than:  1 Hour  Total time spent with patient greater than: 1 Hour        Subjective:   HPI:  Babak Wolff is a 59 y.o. old male with a history of hypertension and alcohol use. He states that he's been dealing with on and off abdominal pain for the past 3 months. He describes the pain as sudden onset and his lower abdomen. This morning he had the same amount of pain particularly worse also tachypneic, felt short of breath and when he tried to stand up he collapsed. She doesn't think she lost consciousness and he states that he just got weak and his knees. He states that his bili is only somewhat distended but it felt more firm today  Emergency room he had a CT of the abdomen that showed the large intraperitoneal hematoma. Hemoglobin was decreased from 12.5-7.1 compared to a few months ago. LFTs slightly elevated including bilirubin and his INR is 1.68. Contour of the liver was irregular.  Initially hypotensive with elevated lactic acid. After he received a liter of normal saline has blood pressure stabilized. After that 2 units of PRBCs were ordered.    Principal Problem:    Shock circulatory  Active Problems:    Hemorrhage, peritoneal    Acute blood loss anemia    Lactic acidosis    Alcohol withdrawal, uncomplicated      Allergies: Review of patient's allergies indicates no known allergies.     MEDS:  Scheduled Meds:    ertapenem (INVanz) IV  1 g Intravenous Q24H     [MAR Hold] folic acid  1 mg Oral DAILY    Or     [MAR Hold] folic acid  1 mg Intramuscular DAILY     [MAR Hold] insulin aspart (NovoLOG) injection   Subcutaneous Q4H FIXED TIMES     [MAR Hold] multivitamin therapeutic  1 tablet Oral DAILY     pantoprazole  40 mg Intravenous Q24H     phytonadione ((AQUA-MEPHYTON) IVPB  10 mg Intravenous Daily 0800     [MAR Hold] senna-docusate  1 tablet Oral BID    Or     [MAR Hold] senna (SENOKOT) syrup  8.8 mg Enteral Tube BID     [MAR Hold] thiamine  100 mg Oral DAILY    Or     [MAR Hold] thiamine  100 mg Intramuscular  "DAILY     Continuous Infusions:    [MAR Hold] dexmedetomidine infusion orderable (PRECEDEX) 0.4 mcg/kg/hr (01/11/17 0906)     dexmedetomidine 200 mcg/50 mL in NS (PRECEDEX) (4mcg/mL)       fentaNYL 1500 mcg/150 ml in NS (10 mcg/ml)       norepinephrine IV infusion in D5W 10 mcg/min (01/11/17 1158)     phenylephrine IV infusion in NS       nacl 0.9%       vasopressin 100 units/100 ml (PITRESSIN)in D5W (1 unit/ml) 2 Units/hr (01/11/17 1215)     PRN Meds:.[MAR Hold] benzocaine-menthol, [MAR Hold] bisacodyl, [MAR Hold] dextrose 50 % (D50W), fentaNYL 1500 mcg/150 ml in NS (10 mcg/ml), [MAR Hold] fentaNYL pf, fentaNYL pf, fentaNYL pf, [MAR Hold] gabapentin, [MAR Hold] glucagon (human recombinant), [MAR Hold] hydrALAZINE, [MAR Hold] LORazepam **OR** [MAR Hold] LORazepam **OR** [MAR Hold] LORazepam, [MAR Hold] LORazepam, [MAR Hold] magnesium hydroxide, [MAR Hold] naloxone **OR** [MAR Hold] naloxone, [MAR Hold] ondansetron **OR** [MAR Hold] ondansetron, [MAR Hold] oxyCODONE, [MAR Hold] polyvinyl alcohol, Insert peripheral IV **AND** Saline lock IV **AND** [MAR Hold] sodium chloride, [MAR Hold] sodium chloride 0.9%      Objective:   VITALS:    Visit Vitals     /49     Pulse 85     Temp (!) 96.1  F (35.6  C) (Axillary)     Resp 14     Ht 6' 3\" (1.905 m)     Wt (!) 258 lb 4.8 oz (117.2 kg)     SpO2 94%     BMI 32.29 kg/m2     EXAM:  General appearance: intubated , sedated  Head: Normocephalic, without obvious abnormality, atraumatic  Lungs: clear to auscultation bilaterally  Heart: regular rate and rhythm, S1, S2 normal, no murmur, click, rub or gallop  Abdomen: s/p laparotomy, 2 ZACHARY drains in place. Abdomen is soft  Extremities: trace edema marta  Neurologic: sedated    I&O:    Intake/Output Summary (Last 24 hours) at 01/11/17 1311  Last data filed at 01/11/17 1307   Gross per 24 hour   Intake 9608.92 ml   Output   6815 ml   Net 2793.92 ml       Data Review:    CBC:   Lab Results   Component Value Date    WBC 13.6 (H) " 01/11/2017    WBC 6.1 06/16/2015    RBC 2.23 (L) 01/11/2017     BMP:   Lab Results   Component Value Date    CO2 16 (L) 01/11/2017    BUN 32 (H) 01/11/2017    CREATININE 2.07 (H) 01/11/2017    CALCIUM 6.8 (L) 01/11/2017     Serum Glucose range:Invalid input(s): GLUCOSEPOCT      By:  Chi Watson, 1/11/2017, 1:11 PM    Primary Care Physician:  Shaheen Smiley MD

## 2021-06-23 ENCOUNTER — APPOINTMENT (INPATIENT)
Dept: NON INVASIVE DIAGNOSTICS | Facility: HOSPITAL | Age: 76
DRG: 292 | End: 2021-06-23
Payer: COMMERCIAL

## 2021-06-23 PROBLEM — R10.9 ABDOMINAL PAIN: Status: ACTIVE | Noted: 2021-06-23

## 2021-06-23 LAB
ALBUMIN SERPL BCP-MCNC: 3.2 G/DL (ref 3.5–5)
ALP SERPL-CCNC: 68 U/L (ref 46–116)
ALT SERPL W P-5'-P-CCNC: 31 U/L (ref 12–78)
ANION GAP SERPL CALCULATED.3IONS-SCNC: 12 MMOL/L (ref 4–13)
AST SERPL W P-5'-P-CCNC: 17 U/L (ref 5–45)
BASOPHILS # BLD AUTO: 0.02 THOUSANDS/ΜL (ref 0–0.1)
BASOPHILS NFR BLD AUTO: 1 % (ref 0–1)
BILIRUB SERPL-MCNC: 1.28 MG/DL (ref 0.2–1)
BUN SERPL-MCNC: 8 MG/DL (ref 5–25)
CALCIUM ALBUM COR SERPL-MCNC: 9.2 MG/DL (ref 8.3–10.1)
CALCIUM SERPL-MCNC: 8.6 MG/DL (ref 8.3–10.1)
CHLORIDE SERPL-SCNC: 105 MMOL/L (ref 100–108)
CO2 SERPL-SCNC: 24 MMOL/L (ref 21–32)
CREAT SERPL-MCNC: 0.89 MG/DL (ref 0.6–1.3)
EOSINOPHIL # BLD AUTO: 0.2 THOUSAND/ΜL (ref 0–0.61)
EOSINOPHIL NFR BLD AUTO: 6 % (ref 0–6)
ERYTHROCYTE [DISTWIDTH] IN BLOOD BY AUTOMATED COUNT: 16.4 % (ref 11.6–15.1)
GFR SERPL CREATININE-BSD FRML MDRD: 84 ML/MIN/1.73SQ M
GLUCOSE SERPL-MCNC: 92 MG/DL (ref 65–140)
HCT VFR BLD AUTO: 32.8 % (ref 36.5–49.3)
HGB BLD-MCNC: 9.9 G/DL (ref 12–17)
IMM GRANULOCYTES # BLD AUTO: 0.01 THOUSAND/UL (ref 0–0.2)
IMM GRANULOCYTES NFR BLD AUTO: 0 % (ref 0–2)
LYMPHOCYTES # BLD AUTO: 0.66 THOUSANDS/ΜL (ref 0.6–4.47)
LYMPHOCYTES NFR BLD AUTO: 20 % (ref 14–44)
MAGNESIUM SERPL-MCNC: 1.6 MG/DL (ref 1.6–2.6)
MCH RBC QN AUTO: 25.2 PG (ref 26.8–34.3)
MCHC RBC AUTO-ENTMCNC: 30.2 G/DL (ref 31.4–37.4)
MCV RBC AUTO: 84 FL (ref 82–98)
MONOCYTES # BLD AUTO: 0.45 THOUSAND/ΜL (ref 0.17–1.22)
MONOCYTES NFR BLD AUTO: 13 % (ref 4–12)
NEUTROPHILS # BLD AUTO: 2.01 THOUSANDS/ΜL (ref 1.85–7.62)
NEUTS SEG NFR BLD AUTO: 60 % (ref 43–75)
NRBC BLD AUTO-RTO: 0 /100 WBCS
PLATELET # BLD AUTO: 230 THOUSANDS/UL (ref 149–390)
PMV BLD AUTO: 10.3 FL (ref 8.9–12.7)
POTASSIUM SERPL-SCNC: 3.6 MMOL/L (ref 3.5–5.3)
PROT SERPL-MCNC: 6.1 G/DL (ref 6.4–8.2)
RBC # BLD AUTO: 3.93 MILLION/UL (ref 3.88–5.62)
SODIUM SERPL-SCNC: 141 MMOL/L (ref 136–145)
WBC # BLD AUTO: 3.35 THOUSAND/UL (ref 4.31–10.16)

## 2021-06-23 PROCEDURE — 80053 COMPREHEN METABOLIC PANEL: CPT | Performed by: INTERNAL MEDICINE

## 2021-06-23 PROCEDURE — 99232 SBSQ HOSP IP/OBS MODERATE 35: CPT | Performed by: FAMILY MEDICINE

## 2021-06-23 PROCEDURE — 99232 SBSQ HOSP IP/OBS MODERATE 35: CPT | Performed by: INTERNAL MEDICINE

## 2021-06-23 PROCEDURE — 87493 C DIFF AMPLIFIED PROBE: CPT | Performed by: FAMILY MEDICINE

## 2021-06-23 PROCEDURE — 83735 ASSAY OF MAGNESIUM: CPT | Performed by: INTERNAL MEDICINE

## 2021-06-23 PROCEDURE — 93306 TTE W/DOPPLER COMPLETE: CPT

## 2021-06-23 PROCEDURE — 85025 COMPLETE CBC W/AUTO DIFF WBC: CPT | Performed by: INTERNAL MEDICINE

## 2021-06-23 RX ORDER — TAMSULOSIN HYDROCHLORIDE 0.4 MG/1
0.4 CAPSULE ORAL
Status: DISCONTINUED | OUTPATIENT
Start: 2021-06-23 | End: 2021-06-25 | Stop reason: HOSPADM

## 2021-06-23 RX ORDER — PANTOPRAZOLE SODIUM 20 MG/1
20 TABLET, DELAYED RELEASE ORAL
Status: DISCONTINUED | OUTPATIENT
Start: 2021-06-24 | End: 2021-06-25 | Stop reason: HOSPADM

## 2021-06-23 RX ORDER — FERROUS SULFATE 325(65) MG
325 TABLET ORAL 2 TIMES DAILY WITH MEALS
Status: DISCONTINUED | OUTPATIENT
Start: 2021-06-23 | End: 2021-06-25 | Stop reason: HOSPADM

## 2021-06-23 RX ORDER — DEXAMETHASONE 4 MG/1
4 TABLET ORAL EVERY 6 HOURS
COMMUNITY
End: 2021-06-25 | Stop reason: HOSPADM

## 2021-06-23 RX ORDER — FERROUS SULFATE 325(65) MG
325 TABLET ORAL 2 TIMES DAILY WITH MEALS
COMMUNITY
End: 2021-08-16 | Stop reason: SDUPTHER

## 2021-06-23 RX ORDER — PREDNISONE 10 MG/1
10 TABLET ORAL DAILY
COMMUNITY
End: 2021-06-25 | Stop reason: HOSPADM

## 2021-06-23 RX ORDER — OMEPRAZOLE 20 MG/1
20 CAPSULE, DELAYED RELEASE ORAL DAILY
COMMUNITY
End: 2021-07-23 | Stop reason: SDUPTHER

## 2021-06-23 RX ORDER — TRAMADOL HYDROCHLORIDE 50 MG/1
50 TABLET ORAL EVERY 6 HOURS PRN
COMMUNITY
End: 2022-03-01

## 2021-06-23 RX ORDER — TAMSULOSIN HYDROCHLORIDE 0.4 MG/1
0.4 CAPSULE ORAL
Status: ON HOLD | COMMUNITY
Start: 2021-04-27 | End: 2021-12-23 | Stop reason: RX

## 2021-06-23 RX ORDER — POTASSIUM CHLORIDE 20 MEQ/1
20 TABLET, EXTENDED RELEASE ORAL ONCE
Status: COMPLETED | OUTPATIENT
Start: 2021-06-23 | End: 2021-06-23

## 2021-06-23 RX ORDER — LOPERAMIDE HYDROCHLORIDE 2 MG/1
4 CAPSULE ORAL 4 TIMES DAILY PRN
COMMUNITY
End: 2021-07-13 | Stop reason: HOSPADM

## 2021-06-23 RX ORDER — TRAMADOL HYDROCHLORIDE 50 MG/1
50 TABLET ORAL EVERY 6 HOURS PRN
Status: DISCONTINUED | OUTPATIENT
Start: 2021-06-23 | End: 2021-06-25 | Stop reason: HOSPADM

## 2021-06-23 RX ORDER — LANOLIN ALCOHOL/MO/W.PET/CERES
3 CREAM (GRAM) TOPICAL
Status: DISCONTINUED | OUTPATIENT
Start: 2021-06-23 | End: 2021-06-25 | Stop reason: HOSPADM

## 2021-06-23 RX ORDER — LOPERAMIDE HYDROCHLORIDE 2 MG/1
2 CAPSULE ORAL 2 TIMES DAILY PRN
Status: DISCONTINUED | OUTPATIENT
Start: 2021-06-23 | End: 2021-06-25 | Stop reason: HOSPADM

## 2021-06-23 RX ADMIN — HUMAN ALBUMIN MICROSPHERES AND PERFLUTREN 0.5 ML: 10; .22 INJECTION, SOLUTION INTRAVENOUS at 14:08

## 2021-06-23 RX ADMIN — ENOXAPARIN SODIUM 40 MG: 40 INJECTION SUBCUTANEOUS at 10:00

## 2021-06-23 RX ADMIN — METOPROLOL SUCCINATE 25 MG: 25 TABLET, EXTENDED RELEASE ORAL at 10:00

## 2021-06-23 RX ADMIN — ACETAMINOPHEN 650 MG: 325 TABLET, FILM COATED ORAL at 01:03

## 2021-06-23 RX ADMIN — Medication 3 MG: at 22:52

## 2021-06-23 RX ADMIN — FERROUS SULFATE TAB 325 MG (65 MG ELEMENTAL FE) 325 MG: 325 (65 FE) TAB at 18:30

## 2021-06-23 RX ADMIN — TRAMADOL HYDROCHLORIDE 50 MG: 50 TABLET, FILM COATED ORAL at 22:52

## 2021-06-23 RX ADMIN — FUROSEMIDE 40 MG: 10 INJECTION, SOLUTION INTRAMUSCULAR; INTRAVENOUS at 10:00

## 2021-06-23 RX ADMIN — SACUBITRIL AND VALSARTAN 1 TABLET: 24; 26 TABLET, FILM COATED ORAL at 10:00

## 2021-06-23 RX ADMIN — ACETAMINOPHEN 650 MG: 325 TABLET, FILM COATED ORAL at 10:10

## 2021-06-23 RX ADMIN — CLOPIDOGREL BISULFATE 75 MG: 75 TABLET ORAL at 10:00

## 2021-06-23 RX ADMIN — SACUBITRIL AND VALSARTAN 1 TABLET: 24; 26 TABLET, FILM COATED ORAL at 17:23

## 2021-06-23 RX ADMIN — POTASSIUM CHLORIDE 20 MEQ: 1500 TABLET, EXTENDED RELEASE ORAL at 10:10

## 2021-06-23 RX ADMIN — ATORVASTATIN CALCIUM 80 MG: 40 TABLET, FILM COATED ORAL at 17:22

## 2021-06-23 RX ADMIN — ACETAMINOPHEN 650 MG: 325 TABLET, FILM COATED ORAL at 18:34

## 2021-06-23 RX ADMIN — SPIRONOLACTONE 25 MG: 25 TABLET, FILM COATED ORAL at 10:00

## 2021-06-23 RX ADMIN — TAMSULOSIN HYDROCHLORIDE 0.4 MG: 0.4 CAPSULE ORAL at 18:30

## 2021-06-23 NOTE — UTILIZATION REVIEW
Initial Clinical Review    Admission: Date/Time/Statement:   Admission Orders (From admission, onward)     Ordered        06/22/21 1042  INPATIENT ADMISSION  Once                   Orders Placed This Encounter   Procedures    INPATIENT ADMISSION     Standing Status:   Standing     Number of Occurrences:   1     Order Specific Question:   Level of Care     Answer:   Med Surg [16]     Order Specific Question:   Estimated length of stay     Answer:   More than 2 Midnights     Order Specific Question:   Certification     Answer:   I certify that inpatient services are medically necessary for this patient for a duration of greater than two midnights  See H&P and MD Progress Notes for additional information about the patient's course of treatment  ED Arrival Information     Expected Arrival Acuity    - 6/22/2021 08:28 Urgent         Means of arrival Escorted by Service Admission type    [address] [facility] Urgent         Arrival complaint    SOB/EDEMA        Chief Complaint   Patient presents with    Edema     c/o generalized increased edema and increased SOB x 3 days  Pt was recently discharged from hospital for similar symptoms       Initial Presentation: 76 y o  male with metastatic prostate cancer, status post aortic valve replacement just over week ago, who presents to ED from home with worsening dyspnea over the last 2-3 days acutely worse today  Pt received script for post op lasix several days late from remote pharmacy with 5lb wt gain , prn chest pain with exertion, mild leg swelling  On exam, pt had decreased breath sound with wheezing and rhonchi per  ED provider, skin pale,  BLE edema, pt tachypneic, erythema R elbow  Pt given IV lasix in ED  Pt admitted as Inpatient to telemetry with acute on chronic CHF  Plan -telemetry, trend troponin, Lasix 40 BID IV, echo, daily wt, I/O, cardiology consult  Cardiology-Will review repeat echo to assess LV function and TAVR status    Likely will need Life Vest on discharge if EF remains less than 35%  Continue with IV diuresis, I/O, daily wt, 1800 ml FR , 2 GM Na diet, restart BB and statin, Plavix, restart home meds-metoprolol XL 25 mg p  o  Daily and spironolactone 12 5 mg p  o  Daily  Will start pt on Entresto       Date: 6/23/21 Day 2:   Cardiology-  Pts weight =227 lbs today with I/O = -3 1 L in 24 hrs  Creat stable , will continue with IV Lasix BID   Started Entresto yesterday,  Echo pending  Pt w/o chest pain, no SOB but reports abdominal discomfot, nausea and reported diarrhea last night  Lungs decreased, sating well on RA, has +1 BLE edema and abdominal distention  Pro [id number] on admission  Medicine-continue with IV diuresis  Echo done and results pending  Pt reports abdominal pain and diarrhea  Abdomen soft with present bowel sounds  ED Triage Vitals   Temperature Pulse Respirations Blood Pressure SpO2   06/22/21 0830 06/22/21 0830 06/22/21 0830 06/22/21 0830 06/22/21 0830   98 9 °F (37 2 °C) 97 (!) 28 120/65 97 %      Temp Source Heart Rate Source Patient Position - Orthostatic VS BP Location FiO2 (%)   06/22/21 0830 06/22/21 0830 06/22/21 0830 06/22/21 0830 --   Oral Monitor Lying Left arm       Pain Score       06/22/21 1500       No Pain          Wt Readings from Last 1 Encounters:   06/23/21 103 kg (227 lb 8 2 oz)     Additional Vital Signs:   Date/Time  Temp  Pulse  Resp  BP  MAP (mmHg)  SpO2    06/23/21 0734  98 6 °F (37 °C)  79  18  131/73  --  95 %    06/22/21 2239  98 3 °F (36 8 °C)  106Abnormal   20  113/70  --  93 %    06/22/21 1500  98 2 °F (36 8 °C)  91  16  125/59  --  96 %    06/22/21 1206  --  88  18  116/68  85  95 %        Pertinent Labs/Diagnostic Test Results:     6/22   CXR-No acute cardiopulmonary disease  ECG-Normal sinus rhythm  Left bundle branch block  6/23 ECHo-  LEFT VENTRICLE: The ventricle was dilated  Systolic function was severely reduced  Ejection fraction was estimated to be 20 %   There was dyskinesis and aneurysmal deformity of the apical anterior, mid anteroseptal, apical inferior, apical  septal, and apical wall(s)  Wall thickness was normal  No evidence of apical thrombus  DOPPLER: The transmitral flow pattern was normal  Features were consistent with a pseudonormal left ventricular filling pattern, with concomitant  abnormal relaxation and increased filling pressure (grade 2 diastolic dysfunction)  Doppler parameters were consistent with high ventricular filling pressure     RIGHT VENTRICLE: The size was normal  Systolic function was normal  Wall thickness was normal    LEFT ATRIUM: Size was normal    RIGHT ATRIUM: Size was normal     Results from last 7 days   Lab Units 06/23/21  0521 06/22/21  0926   WBC Thousand/uL 3 35* 4 56   HEMOGLOBIN g/dL 9 9* 9 5*   HEMATOCRIT % 32 8* 31 1*   PLATELETS Thousands/uL 230 220   NEUTROS ABS Thousands/µL 2 01 3 13         Results from last 7 days   Lab Units 06/23/21  0521 06/22/21  0926   SODIUM mmol/L 141 142   POTASSIUM mmol/L 3 6 3 9   CHLORIDE mmol/L 105 107   CO2 mmol/L 24 25   ANION GAP mmol/L 12 10   BUN mg/dL 8 8   CREATININE mg/dL 0 89 0 84   EGFR ml/min/1 73sq m 84 86   CALCIUM mg/dL 8 6 9 0   MAGNESIUM mg/dL 1 6  --      Results from last 7 days   Lab Units 06/23/21  0521 06/22/21  0926   AST U/L 17 14   ALT U/L 31 34   ALK PHOS U/L 68 72   TOTAL PROTEIN g/dL 6 1* 6 3*   ALBUMIN g/dL 3 2* 3 3*   TOTAL BILIRUBIN mg/dL 1 28* 1 05*         Results from last 7 days   Lab Units 06/23/21  0521 06/22/21  0926   GLUCOSE RANDOM mg/dL 92 91           Results from last 7 days   Lab Units 06/22/21  1604 06/22/21  1201 06/22/21  0926   TROPONIN I ng/mL 0 04 0 03 0 04         Results from last 7 days   Lab Units 06/22/21  0926   PROTIME seconds 13 8   INR  1 05   PTT seconds 30           Results from last 7 days   Lab Units 06/22/21  0926   NT-PRO BNP pg/mL 5,157*           ED Treatment:   Medication Administration from 06/22/2021 0827 to 06/22/2021 1442       Date/Time Order Dose Route Action     06/22/2021 1030 furosemide (LASIX) injection 40 mg 40 mg Intravenous Given     06/22/2021 1326 clopidogrel (PLAVIX) tablet 75 mg 75 mg Oral Given     06/22/2021 1326 spironolactone (ALDACTONE) tablet 25 mg 25 mg Oral Given     06/22/2021 1326 enoxaparin (LOVENOX) subcutaneous injection 40 mg 40 mg Subcutaneous Given        Past Medical History:   Diagnosis Date    High cholesterol     Prostate cancer (HCC)      Present on Admission:  **None**      Admitting Diagnosis: SOB (shortness of breath) [R06 02]  Acute congestive heart failure, unspecified heart failure type ([facility] [address] ) [I50 9]  Age/Sex: 76 y o  male  Admission Orders:  Scheduled Medications:  atorvastatin, 80 mg, Oral, Daily With Dinner  clopidogrel, 75 mg, Oral, Daily  enoxaparin, 40 mg, Subcutaneous, Daily  furosemide, 40 mg, Intravenous, BID (diuretic)  metoprolol succinate, 25 mg, Oral, Daily  sacubitril-valsartan, 1 tablet, Oral, BID  spironolactone, 25 mg, Oral, Daily  potassium chloride (K-DUR,KLOR-CON) CR tablet 20 mEq   Dose: 20 mEq  Freq: Once Route: PO x1 6/23    Continuous IV Infusions:     PRN Meds:  acetaminophen, 650 mg, Oral, Q6H PRN x1 6/22, x2 6/23  loperamide, 2 mg, Oral, BID PRN    telemetry  Daily wt, I/O  FR 1800 ml , 2 gm Na  Dental sioft diet    IP CONSULT TO CARDIOLOGY  IP CONSULT TO NUTRITION SERVICES    Network Utilization Review Department  ATTENTION: Please call with any questions or concerns to [phone] and carefully listen to the prompts so that you are directed to the right person  All voicemails are confidential   Mary Lou Hannah all requests for admission clinical reviews, approved or denied determinations and any other requests to dedicated fax number below belonging to the campus where the patient is receiving treatment   List of dedicated fax numbers for the Facilities:  FACILITY NAME UR FAX NUMBER   ADMISSION DENIALS (Administrative/Medical Necessity) [phone]   [address] (Maternity/NICU/Pediatrics) [facility] [place] [address] [facility] [id number] [place] [address] Mariaelena Cuevas [facility] [address] [place] [address] [phone]

## 2021-06-23 NOTE — ASSESSMENT & PLAN NOTE
In the setting of HF  Continue with the IV diuresis  Echocardiogram done  Results pending   e    Trend troponins as well  And monitor on telemetry for first 24 hours to rule out anginal equivalent

## 2021-06-23 NOTE — PLAN OF CARE
Problem: MOBILITY - ADULT  Goal: Maintain or return to baseline ADL function  Description: INTERVENTIONS:  -  Assess patient's ability to carry out ADLs; assess patient's baseline for ADL function and identify physical deficits which impact ability to perform ADLs (bathing, care of mouth/teeth, toileting, grooming, dressing, etc )  - Assess/evaluate cause of self-care deficits   - Assess range of motion  - Assess patient's mobility; develop plan if impaired  - Assess patient's need for assistive devices and provide as appropriate  - Encourage maximum independence but intervene and supervise when necessary  - Involve family in performance of ADLs  - Assess for home care needs following discharge   - Consider OT consult to assist with ADL evaluation and planning for discharge  - Provide patient education as appropriate  Outcome: Progressing  Goal: Maintains/Returns to pre admission functional level  Description: INTERVENTIONS:  - Perform BMAT or MOVE assessment daily    - Set and communicate daily mobility goal to care team and patient/family/caregiver     - Collaborate with rehabilitation services on mobility goals if consulted    - Out of bed for toileting  - Record patient progress and toleration of activity level   Outcome: Progressing     Problem: Potential for Falls  Goal: Patient will remain free of falls  Description: INTERVENTIONS:  - Educate patient/family on patient safety including physical limitations  - Instruct patient to call for assistance with activity   - Consult OT/PT to assist with strengthening/mobility   - Keep Call bell within reach  - Keep bed low and locked with side rails adjusted as appropriate  - Keep care items and personal belongings within reach  - Initiate and maintain comfort rounds  - Make Fall Risk Sign visible to staff    - Apply yellow socks and bracelet for high fall risk patients  - Consider moving patient to room near nurses station  Outcome: Progressing     Problem: Prexisting or High Potential for Compromised Skin Integrity  Goal: Skin integrity is maintained or improved  Description: INTERVENTIONS:  - Identify patients at risk for skin breakdown  - Assess and monitor skin integrity  - Assess and monitor nutrition and hydration status  - Monitor labs   - Assess for incontinence   - Turn and reposition patient  - Assist with mobility/ambulation  - Relieve pressure over bony prominences  - Avoid friction and shearing  - Provide appropriate hygiene as needed including keeping skin clean and dry  - Evaluate need for skin moisturizer/barrier cream  - Collaborate with interdisciplinary team   - Patient/family teaching  - Consider wound care consult   Outcome: Progressing     Problem: Nutrition/Hydration-ADULT  Goal: Nutrient/Hydration intake appropriate for improving, restoring or maintaining nutritional needs  Description: Monitor and assess patient's nutrition/hydration status for malnutrition  Collaborate with interdisciplinary team and initiate plan and interventions as ordered  Monitor patient's weight and dietary intake as ordered or per policy  Utilize nutrition screening tool and intervene as necessary  Determine patient's food preferences and provide high-protein, high-caloric foods as appropriate       INTERVENTIONS:  - Monitor oral intake, urinary output, labs, and treatment plans  - Assess nutrition and hydration status and recommend course of action  - Evaluate amount of meals eaten  - Assist patient with eating if necessary   - Allow adequate time for meals  - Recommend/ encourage appropriate diets, oral nutritional supplements, and vitamin/mineral supplements  - Order, calculate, and assess calorie counts as needed  - Recommend, monitor, and adjust tube feedings and TPN/PPN based on assessed needs  - Assess need for intravenous fluids  - Provide specific nutrition/hydration education as appropriate  - Include patient/family/caregiver in decisions related to nutrition  Outcome: Progressing

## 2021-06-23 NOTE — ASSESSMENT & PLAN NOTE
Patient with abdominal pain and diarrhea  Patient states he takes something for that at home to help stop the diarrhea  Patient states that his pain was of 15/10 however when speaking to him he was resting comfortably  Abdomen was soft bowel sounds were present

## 2021-06-23 NOTE — PROGRESS NOTES
[address]   Progress Note Haitian March Free 1945, 76 y o  male MRN: [id number]  Unit/Bed#: S -01 Encounter: [id number]  Primary Care Provider: No primary care provider on file  Date and time admitted to hospital: 6/22/2021  8:31 AM    Abdominal pain  Assessment & Plan  Patient with abdominal pain and diarrhea  Patient states he takes something for that at home to help stop the diarrhea  Patient states that his pain was of 15/10 however when speaking to him he was resting comfortably  Abdomen was soft bowel sounds were present  Anemia  Assessment & Plan  In the setting of metastatic cancer  Will check ferritin, TIBC  Transfuse as needed  S/P AVR  Assessment & Plan  S/p AVR in [address] [place] [address] more than a week ago  Did not receive his DC meds until 2 days ago  Will get records from [place]  Dyspnea  Assessment & Plan  In the setting of HF  Continue with the IV diuresis  Echocardiogram done  Results pending   e    Trend troponins as well  And monitor on telemetry for first 24 hours to rule out anginal equivalent  Prostate cancer [facility])  Assessment & Plan  Was receiving chemo every 4 weeks in [place] at [place]  Would like to establish care here  Will discuss with [facility]  Will get records from [place]  * Acute on chronic diastolic CHF (congestive heart failure) (HCC)  Assessment & Plan  Wt Readings from Last 3 Encounters:   06/23/21 103 kg (227 lb 8 2 oz)     No echo on our system  Reported EF of 20 % by ED  Will get repeat Echo   Lasix 40 mg BID  Cardiology consult appreciated  Continue with current diuretics  I/O  Daily weights              VTE Pharmacologic Prophylaxis:   Pharmacologic: Enoxaparin (Lovenox)  Mechanical VTE Prophylaxis in Place: Yes    Patient Centered Rounds: I have performed bedside rounds with nursing staff today          Education and Discussions with Family / Patient:  Offered but the patient declined    Time Spent for Care: 20 minutes  More than 50% of total time spent on counseling and coordination of care as described above  Current Length of Stay: 1 day(s)    Current Patient Status: Inpatient   Certification Statement: The patient will continue to require additional inpatient hospital stay due to Needing IV diuresis and possible  life vest    Discharge Plan:  Hopeful discharge in the next 48 hours    Code Status: Level 1 - Full Code      Subjective:   Patient seen examined  Complains of abdominal pain  Also had some diarrhea this morning  Objective:     Vitals:   Temp (24hrs), Av 4 °F (36 9 °C), Min:98 2 °F (36 8 °C), Max:98 6 °F (37 °C)    Temp:  [98 2 °F (36 8 °C)-98 6 °F (37 °C)] 98 6 °F (37 °C)  HR:  [] 79  Resp:  [16-20] 18  BP: (113-131)/(59-73) 131/73  SpO2:  [93 %-96 %] 95 %  Body mass index is 35 63 kg/m²  Input and Output Summary (last 24 hours):        Intake/Output Summary (Last 24 hours) at 2021 1418  Last data filed at 2021 0653  Gross per 24 hour   Intake 600 ml   Output 1800 ml   Net -1200 ml       Physical Exam:     Physical Exam  (   General Appearance:    Alert, cooperative, no distress, appears stated age                               Lungs:     Clear to auscultation bilaterally, respirations unlabored       Heart:    Regular rate and rhythm, S1 and S2 normal, no murmur, rub    or gallop   Abdomen:     Soft, non-tender, bowel sounds active all four quadrants,     no masses, no organomegaly           Extremities:   Extremities normal, atraumatic, no cyanosis or edema       Additional Data:     Labs:    Results from last 7 days   Lab Units 21  0521   WBC Thousand/uL 3 35*   HEMOGLOBIN g/dL 9 9*   HEMATOCRIT % 32 8*   PLATELETS Thousands/uL 230   NEUTROS PCT % 60   LYMPHS PCT % 20   MONOS PCT % 13*   EOS PCT % 6     Results from last 7 days   Lab Units 21  0521   SODIUM mmol/L 141   POTASSIUM mmol/L 3 6   CHLORIDE mmol/L 105   CO2 mmol/L 24   BUN mg/dL 8   CREATININE mg/dL 0  89   ANION GAP mmol/L 12   CALCIUM mg/dL 8 6   ALBUMIN g/dL 3 2*   TOTAL BILIRUBIN mg/dL 1 28*   ALK PHOS U/L 68   ALT U/L 31   AST U/L 17   GLUCOSE RANDOM mg/dL 92     Results from last 7 days   Lab Units 06/22/21  0926   INR  1 05                       * I Have Reviewed All Lab Data Listed Above  * Additional Pertinent Lab Tests Reviewed: All [place] Admission Reviewed      Recent Cultures (last 7 days):           Last 24 Hours Medication List:   Current Facility-Administered Medications   Medication Dose Route Frequency Provider Last Rate    acetaminophen  650 mg Oral Q6H PRN Jenny Castaneda PA-C      atorvastatin  80 mg Oral Daily With Dinner [facility], CRNP      clopidogrel  75 mg Oral Daily Shaquille Miller MD      enoxaparin  40 mg Subcutaneous Daily Shaquille Miller MD      furosemide  40 mg Intravenous BID (diuretic) Shaquille Miller MD      loperamide  2 mg Oral BID PRN Cezar Blackman MD      metoprolol succinate  25 mg Oral Daily [facility], CRNP      sacubitril-valsartan  1 tablet Oral BID [facility], CRNP      spironolactone  25 mg Oral Daily Shaquille Miller MD          Today, Patient Was Seen By: Cezar Blackman MD    ** Please Note: Dictation voice to text software may have been used in the creation of this document   **

## 2021-06-23 NOTE — ASSESSMENT & PLAN NOTE
S/p AVR in [facility] more than a week ago  Did not receive his DC meds until 2 days ago  Will get records from [address]

## 2021-06-23 NOTE — PROGRESS NOTES
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  On Plavix  Echo pending      3  Coronary artery disease with history of PCI/stenting to circumflex RCA in 2001  Restart statin and beta-blocker     4  Cardiomyopathy likely ischemic  EF 20%  Restart home medications:  metoprolol XL 25 mg p o  Daily and spironolactone 12 5 mg p o  Daily  Started on entesto yesterday   Echocardiogram pending   Likely will need life vest prior to discharge    Subjective  Patient reporting abdominal discomfort with nausea  He also reports diarrhea last night  D/w RN  No complaint of chest pain or SOB today  No fever or chills  Review of Systems   Constitutional: Positive for decreased appetite  Negative for chills and fever  Cardiovascular: Positive for dyspnea on exertion and leg swelling  Negative for chest pain, orthopnea and palpitations  Respiratory: Negative for cough and shortness of breath  Musculoskeletal: Negative for falls  Gastrointestinal: Positive for bloating, diarrhea and nausea  Neurological: Negative for dizziness and light-headedness  Psychiatric/Behavioral: Negative for altered mental status  All other systems reviewed and are negative  Objective:   Vitals: Blood pressure 131/73, pulse 79, temperature 98 6 °F (37 °C), temperature source Oral, resp  rate 18, height 5' 7" (1 702 m), weight 103 kg (227 lb 8 2 oz), SpO2 95 %  ,       Body mass index is 35 63 kg/m²  ,     Systolic (44AQA), DZU:142 , Min:113 , MJQ:612     Diastolic (21RYD), QGP:78, Min:59, Max:73          Intake/Output Summary (Last 24 hours) at 6/23/2021 0948  Last data filed at 6/23/2021 2720  Gross per 24 hour   Intake 120 ml   Output 3275 ml   Net -3155 ml     Weight (last 2 days)     Date/Time   Weight    06/23/21 0600   103 (227 52)    06/22/21 1500   103 (226 1)    06/22/21 1218   103 (226 1)    06/22/21 0830   104 (229 28)                Telemetry Review: Normal sinus rhythm, No ectopy     Physical Exam  Constitutional:       General: He is not in acute distress  Appearance: He is obese  HENT:      Head: Normocephalic  Mouth/Throat:      Mouth: Mucous membranes are moist    Cardiovascular:      Rate and Rhythm: Normal rate and regular rhythm  Pulses: Normal pulses  Heart sounds: Murmur heard  Comments: Grade II systolic murmur   Pulmonary:      Effort: Pulmonary effort is normal  No respiratory distress  Comments: Decreased  RA  No crackles   Abdominal:      General: Bowel sounds are normal  There is distension  Palpations: Abdomen is soft  Musculoskeletal:         General: Swelling present  Normal range of motion  Cervical back: Neck supple  Comments: + 1 edema bilateral L E   Skin:     General: Skin is warm and dry  Capillary Refill: Capillary refill takes less than 2 seconds  Neurological:      General: No focal deficit present  Mental Status: He is alert and oriented to person, place, and time     Psychiatric:         Mood and Affect: Mood normal          LABORATORY RESULTS  Results from last 7 days   Lab Units 06/22/21  1604 06/22/21  1201 06/22/21  0926   TROPONIN I ng/mL 0 04 0 03 0 04     CBC with diff:   Results from last 7 days   Lab Units 06/23/21  0521 06/22/21  0926   WBC Thousand/uL 3 35* 4 56   HEMOGLOBIN g/dL 9 9* 9 5*   HEMATOCRIT % 32 8* 31 1*   MCV fL 84 84   PLATELETS Thousands/uL 230 220   MCH pg 25 2* 25 7*   MCHC g/dL 30 2* 30 5*   RDW % 16 4* 16 8*   MPV fL 10 3 10 4   NRBC AUTO /100 WBCs 0 0 CMP:  Results from last 7 days   Lab Units 06/23/21  0521 06/22/21  0926   POTASSIUM mmol/L 3 6 3 9   CHLORIDE mmol/L 105 107   CO2 mmol/L 24 25   BUN mg/dL 8 8   CREATININE mg/dL 0 89 0 84   CALCIUM mg/dL 8 6 9 0   AST U/L 17 14   ALT U/L 31 34   ALK PHOS U/L 68 72   EGFR ml/min/1 73sq m 84 86       BMP:  Results from last 7 days   Lab Units 06/23/21  0521 06/22/21  0926   POTASSIUM mmol/L 3 6 3 9   CHLORIDE mmol/L 105 107   CO2 mmol/L 24 25   BUN mg/dL 8 8   CREATININE mg/dL 0 89 0 84   CALCIUM mg/dL 8 6 9 0       Lab Results   Component Value Date    NTBNP 5,157 (H) 06/22/2021             Results from last 7 days   Lab Units 06/23/21  0521   MAGNESIUM mg/dL 1 6                     Results from last 7 days   Lab Units 06/22/21  0926   INR  1 05       Lipid Profile:   No results found for: CHOL  No results found for: HDL  No results found for: LDLCALC  No results found for: TRIG    Cardiac testing:   No results found for this or any previous visit  No results found for this or any previous visit  No results found for this or any previous visit  No valid procedures specified  No results found for this or any previous visit          Meds/Allergies   all current active meds have been reviewed and current meds:   Current Facility-Administered Medications   Medication Dose Route Frequency    acetaminophen (TYLENOL) tablet 650 mg  650 mg Oral Q6H PRN    atorvastatin (LIPITOR) tablet 80 mg  80 mg Oral Daily With Dinner    clopidogrel (PLAVIX) tablet 75 mg  75 mg Oral Daily    enoxaparin (LOVENOX) subcutaneous injection 40 mg  40 mg Subcutaneous Daily    furosemide (LASIX) injection 40 mg  40 mg Intravenous BID (diuretic)    metoprolol succinate (TOPROL-XL) 24 hr tablet 25 mg  25 mg Oral Daily    sacubitril-valsartan (ENTRESTO) 24-26 MG per tablet 1 tablet  1 tablet Oral BID    spironolactone (ALDACTONE) tablet 25 mg  25 mg Oral Daily     Medications Prior to Admission   Medication    clopidogrel (PLAVIX) 75 mg tablet    furosemide (LASIX) 20 mg tablet    spironolactone (ALDACTONE) 25 mg tablet     Counseling / Coordination of Care  Total floor / unit time spent today 20 minutes  Greater than 50% of total time was spent with the patient and / or family counseling and / or coordination of care  ** Please Note: Dragon 360 Dictation voice to text software may have been used in the creation of this document   **

## 2021-06-23 NOTE — ASSESSMENT & PLAN NOTE
Wt Readings from Last 3 Encounters:   06/23/21 103 kg (227 lb 8 2 oz)     No echo on our system  Reported EF of 20 % by ED  Will get repeat Echo   Lasix 40 mg BID  Cardiology consult appreciated    Continue with current diuretics  I/O  Daily weights

## 2021-06-24 LAB
ANION GAP SERPL CALCULATED.3IONS-SCNC: 10 MMOL/L (ref 4–13)
BUN SERPL-MCNC: 10 MG/DL (ref 5–25)
C DIFF TOX GENS STL QL NAA+PROBE: NEGATIVE
CALCIUM SERPL-MCNC: 8.1 MG/DL (ref 8.3–10.1)
CHLORIDE SERPL-SCNC: 106 MMOL/L (ref 100–108)
CO2 SERPL-SCNC: 25 MMOL/L (ref 21–32)
CREAT SERPL-MCNC: 0.96 MG/DL (ref 0.6–1.3)
GFR SERPL CREATININE-BSD FRML MDRD: 77 ML/MIN/1.73SQ M
GLUCOSE SERPL-MCNC: 93 MG/DL (ref 65–140)
POTASSIUM SERPL-SCNC: 3.6 MMOL/L (ref 3.5–5.3)
SODIUM SERPL-SCNC: 141 MMOL/L (ref 136–145)

## 2021-06-24 PROCEDURE — 99232 SBSQ HOSP IP/OBS MODERATE 35: CPT | Performed by: INTERNAL MEDICINE

## 2021-06-24 PROCEDURE — 93306 TTE W/DOPPLER COMPLETE: CPT | Performed by: INTERNAL MEDICINE

## 2021-06-24 PROCEDURE — 99232 SBSQ HOSP IP/OBS MODERATE 35: CPT | Performed by: FAMILY MEDICINE

## 2021-06-24 PROCEDURE — 80048 BASIC METABOLIC PNL TOTAL CA: CPT | Performed by: NURSE PRACTITIONER

## 2021-06-24 RX ORDER — ONDANSETRON 2 MG/ML
INJECTION INTRAMUSCULAR; INTRAVENOUS
Status: DISPENSED
Start: 2021-06-24 | End: 2021-06-24

## 2021-06-24 RX ORDER — ONDANSETRON 2 MG/ML
4 INJECTION INTRAMUSCULAR; INTRAVENOUS ONCE
Status: COMPLETED | OUTPATIENT
Start: 2021-06-24 | End: 2021-06-24

## 2021-06-24 RX ADMIN — CLOPIDOGREL BISULFATE 75 MG: 75 TABLET ORAL at 09:20

## 2021-06-24 RX ADMIN — METOPROLOL SUCCINATE 25 MG: 25 TABLET, EXTENDED RELEASE ORAL at 09:20

## 2021-06-24 RX ADMIN — PANTOPRAZOLE SODIUM 20 MG: 20 TABLET, DELAYED RELEASE ORAL at 05:41

## 2021-06-24 RX ADMIN — TAMSULOSIN HYDROCHLORIDE 0.4 MG: 0.4 CAPSULE ORAL at 17:34

## 2021-06-24 RX ADMIN — FUROSEMIDE 40 MG: 10 INJECTION, SOLUTION INTRAMUSCULAR; INTRAVENOUS at 17:34

## 2021-06-24 RX ADMIN — FERROUS SULFATE TAB 325 MG (65 MG ELEMENTAL FE) 325 MG: 325 (65 FE) TAB at 17:34

## 2021-06-24 RX ADMIN — SPIRONOLACTONE 25 MG: 25 TABLET, FILM COATED ORAL at 09:20

## 2021-06-24 RX ADMIN — ONDANSETRON 4 MG: 2 INJECTION INTRAMUSCULAR; INTRAVENOUS at 01:45

## 2021-06-24 RX ADMIN — Medication 3 MG: at 21:11

## 2021-06-24 RX ADMIN — SACUBITRIL AND VALSARTAN 1 TABLET: 24; 26 TABLET, FILM COATED ORAL at 17:35

## 2021-06-24 RX ADMIN — ACETAMINOPHEN 650 MG: 325 TABLET, FILM COATED ORAL at 23:55

## 2021-06-24 RX ADMIN — SACUBITRIL AND VALSARTAN 1 TABLET: 24; 26 TABLET, FILM COATED ORAL at 09:50

## 2021-06-24 RX ADMIN — ACETAMINOPHEN 650 MG: 325 TABLET, FILM COATED ORAL at 01:45

## 2021-06-24 RX ADMIN — ATORVASTATIN CALCIUM 80 MG: 40 TABLET, FILM COATED ORAL at 17:34

## 2021-06-24 RX ADMIN — FERROUS SULFATE TAB 325 MG (65 MG ELEMENTAL FE) 325 MG: 325 (65 FE) TAB at 09:20

## 2021-06-24 RX ADMIN — FUROSEMIDE 40 MG: 10 INJECTION, SOLUTION INTRAMUSCULAR; INTRAVENOUS at 09:19

## 2021-06-24 RX ADMIN — ENOXAPARIN SODIUM 40 MG: 40 INJECTION SUBCUTANEOUS at 09:20

## 2021-06-24 NOTE — CASE MANAGEMENT
LOS 1 day, not a bundle or readmission  Patient is a low risk for readmission with risk for readmission score of 12  Patient is alert and oriented x 4 and able to participate in CM assessment  CM met with patient and son John Wall at bedside to introduce self, explain role, complete CM assessment, and discuss DC planning  Patient resides with his son in a 3 floor home with 4 MODESTA then patient has a stair lift to get to the 2nd floor where his bedroom and full bathroom are located  Patient rents out the first level of the home and son resides on top floor  Patient functioned independent PTA with use of cane at times when in home, walker when out of home, and patient recently obtained an electric WC to use as needed  Patient also has a shower chair and grab bar in the bathroom to utilize  Patient's son does the cleaning at home  Patient manages his own medications and both patient and son cook  Patient and son report some challenges with patient being compliant with his dietary modifications  Per conversation with the dietician, when patient's daughter was visiting earlier she explained she has been trying to look into Meals on Wheels, but has not gotten a return call  CM discussed [facility] with patient and son at bedside, and provided information on their services and contact numbers for the local office  No Hx HHC or STR  No LW or POA, declined CM offer for information at this time, reporting daughter Stefan Stewart then son John Wall as his healthcare representatives  CM discussed if interested in initiating an AD to discuss this with patient's PCP  Patient reports he does not have a PCP and wanted to be paired with a [facility] provider  CM discussed [facility] as it would be close to patient, patient would appreciate CM setting up a new PCP appointment with this location  As it is 430 pm the office is now closed and CM to arrange appointment in AM  No Hx MH or substance use  Patient drives  Patient has prescription coverage and prefers using [employer]  Per patient and son plan upon medical stability is for patient to return home with son and no anticipated need for services at this time  Son or daughter to provide transportation  Patient and son report no additional concerns from CM standpoint at this time  CM encouraged patient and son to reach out with any questions or concerns, including if they still have difficulty contacting [facility]  CM will continue to follow for further care coordination needs

## 2021-06-24 NOTE — CASE MANAGEMENT
CM received call from [address] stating that the  Home	[place] is approved and a rep from [employer] will be coming out late this afternoon to fit the patient

## 2021-06-24 NOTE — ASSESSMENT & PLAN NOTE
Was receiving chemo every 4 weeks in [place] at [place]  Would like to establish care here  I did put the number in the chart for [facility]   Will get records from [place]

## 2021-06-24 NOTE — PROGRESS NOTES
[facility]  Progress Note Nevin Alvarez Free 1945, 76 y o  male MRN: [id number]  Unit/Bed#: S -01 Encounter: [id number]  Primary Care Provider: No primary care provider on file  Date and time admitted to hospital: 6/22/2021  8:31 AM    Abdominal pain  Assessment & Plan  Patient with abdominal pain and diarrhea  Patient states he takes something for that at home to help stop the diarrhea  Patient states that his pain was of 15/10 however when speaking to him he was resting comfortably  Abdomen was soft bowel sounds were present     However this pain has resolved    Anemia  Assessment & Plan  In the setting of metastatic cancer  Will check ferritin, TIBC  Transfuse as needed  S/P AVR  Assessment & Plan  S/p AVR in [address] [place] [address] more than a week ago  Did not receive his DC meds until 2 days ago  Will get records from [place]  Dyspnea  Assessment & Plan  In the setting of HF  Continue with the IV diuresis  Echocardiogram done  Results pending   e    Trend troponins as well  And monitor on telemetry for first 24 hours to rule out anginal equivalent  Jeremy More Hopeful discharge tomorrow  This is like please secondary to acute systolic heart failure  Patient is going to need a LifeVest on discharge      Prostate cancer [facility])  Assessment & Plan  Was receiving chemo every 4 weeks in [place] at [place]  Would like to establish care here  I did put the number in the chart for [facility]   Will get records from [place]  * Acute on chronic diastolic CHF (congestive heart failure) (HCC)  Assessment & Plan  Wt Readings from Last 3 Encounters:   06/24/21 99 9 kg (220 lb 3 8 oz)     No echo on our system  Reported EF of 20 % by ED  Will get repeat Echo   Lasix 40 mg BID  Cardiology consult appreciated    Continue with current diuretics  Life vest has been ordered              VTE Pharmacologic Prophylaxis:   Pharmacologic: Enoxaparin (Lovenox)  Mechanical VTE Prophylaxis in Place: Yes    Patient Centered Rounds: I have performed bedside rounds with nursing staff today  Discussions with Specialists or Other Care Team Provider:  Cardiology    Education and Discussions with Family / Patient:  Discussed with the son over the phone    Time Spent for Care: 20 minutes  More than 50% of total time spent on counseling and coordination of care as described above  Current Length of Stay: 2 day(s)    Current Patient Status: Inpatient   Certification Statement: The patient will continue to require additional inpatient hospital stay due to Needing a life vest and IV diuresis    Discharge Plan:  Anticipate discharge tomorrow    Code Status: Level 1 - Full Code      Subjective:   Patient seen examined  He is feeling significantly better today  Objective:     Vitals:   Temp (24hrs), Av 3 °F (36 8 °C), Min:97 9 °F (36 6 °C), Max:98 6 °F (37 °C)    Temp:  [97 9 °F (36 6 °C)-98 6 °F (37 °C)] 98 5 °F (36 9 °C)  HR:  [84-95] 95  Resp:  [17-18] 18  BP: ()/(48-70) 123/70  SpO2:  [90 %-94 %] 94 %  Body mass index is 34 49 kg/m²  Input and Output Summary (last 24 hours):        Intake/Output Summary (Last 24 hours) at 2021 1431  Last data filed at 2021 0700  Gross per 24 hour   Intake 120 ml   Output 425 ml   Net -305 ml       Physical Exam:     Physical Exam  (   General Appearance:    Alert, cooperative, no distress, appears stated age                               Lungs:     Clear to auscultation bilaterally, respirations unlabored       Heart:    Regular rate and rhythm, S1 and S2 normal, no murmur, rub    or gallop   Abdomen:     Soft, non-tender, bowel sounds active all four quadrants,     no masses, no organomegaly           Extremities:   Extremities normal, atraumatic, no cyanosis or edema       Additional Data:     Labs:    Results from last 7 days   Lab Units 21  0521   WBC Thousand/uL 3 35*   HEMOGLOBIN g/dL 9 9*   HEMATOCRIT % 32 8*   PLATELETS Thousands/uL 230 NEUTROS PCT % 60   LYMPHS PCT % 20   MONOS PCT % 13*   EOS PCT % 6     Results from last 7 days   Lab Units 06/24/21  0533 06/23/21  0521   SODIUM mmol/L 141 141   POTASSIUM mmol/L 3 6 3 6   CHLORIDE mmol/L 106 105   CO2 mmol/L 25 24   BUN mg/dL 10 8   CREATININE mg/dL 0 96 0 89   ANION GAP mmol/L 10 12   CALCIUM mg/dL 8 1* 8 6   ALBUMIN g/dL  --  3 2*   TOTAL BILIRUBIN mg/dL  --  1 28*   ALK PHOS U/L  --  68   ALT U/L  --  31   AST U/L  --  17   GLUCOSE RANDOM mg/dL 93 92     Results from last 7 days   Lab Units 06/22/21  0926   INR  1 05                       * I Have Reviewed All Lab Data Listed Above  * Additional Pertinent Lab Tests Reviewed:  [address] [address] Admission Reviewed        Recent Cultures (last 7 days):     Results from last 7 days   Lab Units 06/23/21  1454   C DIFF TOXIN B BY PCR  Negative       Last 24 Hours Medication List:   Current Facility-Administered Medications   Medication Dose Route Frequency Provider Last Rate    acetaminophen  650 mg Oral Q6H PRN Jenny Castaneda PA-C      atorvastatin  80 mg Oral Daily With Dinner [facility], CRNP      clopidogrel  75 mg Oral Daily Shaquille Miller MD      enoxaparin  40 mg Subcutaneous Daily Shaquille Miller MD      ferrous sulfate  325 mg Oral BID With Meals Cezar Blackman MD      furosemide  40 mg Intravenous BID (diuretic) Shaquille Miller MD      loperamide  2 mg Oral BID PRN Cezar Blackman MD      melatonin  3 mg Oral HS Lulpam Peterson PA-C      metoprolol succinate  25 mg Oral Daily [facility], CRNP      pantoprazole  20 mg Oral Early Morning Cezar Blackman MD      sacubitril-valsartan  1 tablet Oral BID [facility], CRNP      spironolactone  25 mg Oral Daily Shaquille Miller MD      tamsulosin  0 4 mg Oral Daily With Nava Davis MD      traMADol  50 mg Oral Q6H PRN Cezar Blackman MD          Today, Patient Was Seen By: Cezar Blackman MD    ** Please Note: Dictation voice to text software may have been used in the creation of this document   **

## 2021-06-24 NOTE — PLAN OF CARE
Problem: MOBILITY - ADULT  Goal: Maintain or return to baseline ADL function  Description: INTERVENTIONS:  -  Assess patient's ability to carry out ADLs; assess patient's baseline for ADL function and identify physical deficits which impact ability to perform ADLs (bathing, care of mouth/teeth, toileting, grooming, dressing, etc )  - Assess/evaluate cause of self-care deficits   - Assess range of motion  - Assess patient's mobility; develop plan if impaired  - Assess patient's need for assistive devices and provide as appropriate  - Encourage maximum independence but intervene and supervise when necessary  - Involve family in performance of ADLs  - Assess for home care needs following discharge   - Consider OT consult to assist with ADL evaluation and planning for discharge  - Provide patient education as appropriate  Outcome: Progressing     Problem: Potential for Falls  Goal: Patient will remain free of falls  Description: INTERVENTIONS:  - Educate patient/family on patient safety including physical limitations  - Instruct patient to call for assistance with activity   - Consult OT/PT to assist with strengthening/mobility   - Keep Call bell within reach  - Keep bed low and locked with side rails adjusted as appropriate  - Keep care items and personal belongings within reach  - Initiate and maintain comfort rounds  - Make Fall Risk Sign visible to staff  - Offer Toileting every 2 Hours, in advance of need  - Initiate/Maintain bed alarm  - Apply yellow socks and bracelet for high fall risk patients  - Consider moving patient to room near nurses station  Outcome: Progressing     Problem: Prexisting or High Potential for Compromised Skin Integrity  Goal: Skin integrity is maintained or improved  Description: INTERVENTIONS:  - Identify patients at risk for skin breakdown  - Assess and monitor skin integrity  - Assess and monitor nutrition and hydration status  - Monitor labs   - Assess for incontinence   - Turn and reposition patient  - Assist with mobility/ambulation  - Relieve pressure over bony prominences  - Avoid friction and shearing  - Provide appropriate hygiene as needed including keeping skin clean and dry  - Evaluate need for skin moisturizer/barrier cream  - Collaborate with interdisciplinary team   - Patient/family teaching  - Consider wound care consult   Outcome: Progressing     Problem: Nutrition/Hydration-ADULT  Goal: Nutrient/Hydration intake appropriate for improving, restoring or maintaining nutritional needs  Description: Monitor and assess patient's nutrition/hydration status for malnutrition  Collaborate with interdisciplinary team and initiate plan and interventions as ordered  Monitor patient's weight and dietary intake as ordered or per policy  Utilize nutrition screening tool and intervene as necessary  Determine patient's food preferences and provide high-protein, high-caloric foods as appropriate       INTERVENTIONS:  - Monitor oral intake, urinary output, labs, and treatment plans  - Assess nutrition and hydration status and recommend course of action  - Evaluate amount of meals eaten  - Assist patient with eating if necessary   - Allow adequate time for meals  - Recommend/ encourage appropriate diets, oral nutritional supplements, and vitamin/mineral supplements  - Order, calculate, and assess calorie counts as needed  - Recommend, monitor, and adjust tube feedings and TPN/PPN based on assessed needs  - Assess need for intravenous fluids  - Provide specific nutrition/hydration education as appropriate  - Include patient/family/caregiver in decisions related to nutrition  Outcome: Progressing

## 2021-06-24 NOTE — CASE MANAGEMENT
CORKY was notified by Cardiology that patient is ordered a [employer] Life Vest   Order and supporting documentation faxed to [facility] at [phone]  CORKY called [employer] rep Shaan Martin at [phone] who reports that patient should be able to be fitted this evening

## 2021-06-24 NOTE — ASSESSMENT & PLAN NOTE
Wt Readings from Last 3 Encounters:   06/24/21 99 9 kg (220 lb 3 8 oz)     No echo on our system  Reported EF of 20 % by ED  Will get repeat Echo   Lasix 40 mg BID  Cardiology consult appreciated    Continue with current diuretics  Life vest has been ordered

## 2021-06-24 NOTE — ASSESSMENT & PLAN NOTE
S/p AVR in [facility] more than a week ago  Did not receive his DC meds until 2 days ago  Will get records from [address]

## 2021-06-24 NOTE — ASSESSMENT & PLAN NOTE
Patient with abdominal pain and diarrhea  Patient states he takes something for that at home to help stop the diarrhea  Patient states that his pain was of 15/10 however when speaking to him he was resting comfortably  Abdomen was soft bowel sounds were present     However this pain has resolved

## 2021-06-24 NOTE — ASSESSMENT & PLAN NOTE
In the setting of HF  Continue with the IV diuresis  Echocardiogram done  Results pending   e    Trend troponins as well  And monitor on telemetry for first 24 hours to rule out anginal equivalent  Mike Zuñiga Hopeful discharge tomorrow  This is like please secondary to acute systolic heart failure    Patient is going to need a LifeVest on discharge

## 2021-06-24 NOTE — PROGRESS NOTES
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: with relatively good diuretic response overnight with stable Cr  Continue current dose today and measure strict I/Os and daily weights  2   ICM: with significantly reduced EF and aneurysmal wall motion of apical segments  Started on [place] and continued on B-blocker, spironolactone  Will need Life vest prior to discharge  3   Severe AS s/p TAVR: stable on echo with mean gradient of 10 mmHg  Continued on plavix, B-blocker, and statin  4   CAD: with prior PCI/stenting of Cx and RCA in 2001, stable currently, continued on ASA, statin, B-blocker  Subjective:   Patient seen and examined  No significant events overnight   stable dyspnea on exertion, lower extremity edema, ; pertinent negatives - chest pain, chest pressure/discomfort, near-syncope and palpitations  Objective:     Vitals: Blood pressure 123/70, pulse 95, temperature 98 5 °F (36 9 °C), temperature source Oral, resp  rate 18, height 5' 7" (1 702 m), weight 99 9 kg (220 lb 3 8 oz), SpO2 94 %  , Body mass index is 34 49 kg/m² ,   Orthostatic Blood Pressures      Most Recent Value   Blood Pressure  123/70 filed at 06/24/2021 0700   Patient Position - Orthostatic VS  Sitting filed at 06/24/2021 0700            Intake/Output Summary (Last 24 hours) at 6/24/2021 0952  Last data filed at 6/24/2021 0700  Gross per 24 hour   Intake 120 ml   Output 425 ml   Net -305 ml       TELE: No significant arrhythmias seen      Physical Exam:    GEN: Royce BAER Free appears well, alert and oriented x 3, pleasant and cooperative   HEENT: pupils equal, round, and reactive to light; extraocular muscles intact  NECK: supple, no carotid bruits   HEART: regular rhythm, normal S1 and S2, +systolic murmur, no clicks, gallops or rubs   LUNGS: diminished breath sounds bilaterally ABDOMEN: normal bowel sounds, soft, no tenderness, + distention  EXTREMITIES: peripheral pulses normal; no clubbing, cyanosis, + edema  NEURO: no focal findings   SKIN: normal without suspicious lesions on exposed skin    Medications:      Current Facility-Administered Medications:     acetaminophen (TYLENOL) tablet 650 mg, 650 mg, Oral, Q6H PRN, Jenny Castaneda PA-C, 650 mg at 06/24/21 0145    atorvastatin (LIPITOR) tablet 80 mg, 80 mg, Oral, Daily With OMAIRA Rodriguez, 80 mg at 06/23/21 1722    clopidogrel (PLAVIX) tablet 75 mg, 75 mg, Oral, Daily, Silvino Hughes MD, 75 mg at 06/24/21 0920    enoxaparin (LOVENOX) subcutaneous injection 40 mg, 40 mg, Subcutaneous, Daily, Silvino Hughes MD, 40 mg at 06/24/21 0920    ferrous sulfate tablet 325 mg, 325 mg, Oral, BID With Meals, Jasbir Campos MD, 325 mg at 06/24/21 0920    furosemide (LASIX) injection 40 mg, 40 mg, Intravenous, BID (diuretic), Silvino Hughes MD, 40 mg at 06/24/21 0919    loperamide (IMODIUM) capsule 2 mg, 2 mg, Oral, BID PRN, Jasbir Campos MD    melatonin tablet 3 mg, 3 mg, Oral, HS, Lul Corbin PA-C, 3 mg at 06/23/21 2252    metoprolol succinate (TOPROL-XL) 24 hr tablet 25 mg, 25 mg, Oral, Daily, OMAIRA Mccabe, 25 mg at 06/24/21 0920    ondansetron (ZOFRAN) 4 mg/2 mL injection **ADS Override Pull**, , , ,     pantoprazole (PROTONIX) EC tablet 20 mg, 20 mg, Oral, Early Morning, Jasbir Campos MD, 20 mg at 06/24/21 0541    sacubitril-valsartan (ENTRESTO) 24-26 MG per tablet 1 tablet, 1 tablet, Oral, BID, OMAIRA Mccabe, 1 tablet at 06/24/21 0950    spironolactone (ALDACTONE) tablet 25 mg, 25 mg, Oral, Daily, Silvino Hughes MD, 25 mg at 06/24/21 0920    tamsulosin (FLOMAX) capsule 0 4 mg, 0 4 mg, Oral, Daily With Landry Lucio MD, 0 4 mg at 06/23/21 1830    traMADol (ULTRAM) tablet 50 mg, 50 mg, Oral, Q6H PRN, Jasbir Campos MD, 50 mg at 06/23/21 6793     Labs & Results:    Results from last 7 days Lab Units 06/22/21  1604 06/22/21  1201 06/22/21  0926   TROPONIN I ng/mL 0 04 0 03 0 04     Results from last 7 days   Lab Units 06/23/21  0521 06/22/21  0926   WBC Thousand/uL 3 35* 4 56   HEMOGLOBIN g/dL 9 9* 9 5*   HEMATOCRIT % 32 8* 31 1*   PLATELETS Thousands/uL 230 220         Results from last 7 days   Lab Units 06/24/21  0533 06/23/21  0521 06/22/21  0926   POTASSIUM mmol/L 3 6 3 6 3 9   CHLORIDE mmol/L 106 105 107   CO2 mmol/L 25 24 25   BUN mg/dL 10 8 8   CREATININE mg/dL 0 96 0 89 0 84   CALCIUM mg/dL 8 1* 8 6 9 0   ALK PHOS U/L  --  68 72   ALT U/L  --  31 34   AST U/L  --  17 14     Results from last 7 days   Lab Units 06/22/21  0926   INR  1 05   PTT seconds 30     Results from last 7 days   Lab Units 06/23/21  0521   MAGNESIUM mg/dL 1 6       Echo: personally reviewed - dilated LV with significantly reduced LV function of 20%, dyskinetic and aneurysmal apical segments, G2DD with elevated filling pressure, mild MR, stable TAVR with mean gradient 10 mmHg    EKG personally reviewed by Paula Minor MD

## 2021-06-24 NOTE — NURSING NOTE
Patient stated that his current home address is [address], [place]  And not [address] [place] [address]

## 2021-06-24 NOTE — CASE MANAGEMENT
CM spoke to Min Belcher at [address] [place] [address] Internal Medicine to arrange new PCP appointment  New appointment made for July 1st at 1 pm with Dr Juanita Cruz  CM added appointment to follow up providers

## 2021-06-25 VITALS
SYSTOLIC BLOOD PRESSURE: 103 MMHG | DIASTOLIC BLOOD PRESSURE: 60 MMHG | RESPIRATION RATE: 16 BRPM | HEIGHT: 67 IN | HEART RATE: 91 BPM | WEIGHT: 219.8 LBS | BODY MASS INDEX: 34.5 KG/M2 | TEMPERATURE: 99 F | OXYGEN SATURATION: 90 %

## 2021-06-25 PROBLEM — R06.00 DYSPNEA: Status: RESOLVED | Noted: 2021-06-22 | Resolved: 2021-06-25

## 2021-06-25 PROBLEM — C61 PROSTATE CANCER (HCC): Status: RESOLVED | Noted: 2021-06-22 | Resolved: 2021-06-25

## 2021-06-25 PROBLEM — R10.9 ABDOMINAL PAIN: Status: RESOLVED | Noted: 2021-06-23 | Resolved: 2021-06-25

## 2021-06-25 PROCEDURE — 99239 HOSP IP/OBS DSCHRG MGMT >30: CPT | Performed by: FAMILY MEDICINE

## 2021-06-25 RX ORDER — ATORVASTATIN CALCIUM 80 MG/1
80 TABLET, FILM COATED ORAL
Qty: 30 TABLET | Refills: 0 | Status: SHIPPED | OUTPATIENT
Start: 2021-06-25 | End: 2021-08-10 | Stop reason: SDUPTHER

## 2021-06-25 RX ORDER — FUROSEMIDE 40 MG/1
40 TABLET ORAL DAILY
Qty: 30 TABLET | Refills: 0 | Status: SHIPPED | OUTPATIENT
Start: 2021-06-25 | End: 2021-07-30 | Stop reason: SDUPTHER

## 2021-06-25 RX ORDER — METOPROLOL SUCCINATE 25 MG/1
25 TABLET, EXTENDED RELEASE ORAL DAILY
Qty: 30 TABLET | Refills: 0 | Status: SHIPPED | OUTPATIENT
Start: 2021-06-26 | End: 2021-07-06

## 2021-06-25 RX ADMIN — TRAMADOL HYDROCHLORIDE 50 MG: 50 TABLET, FILM COATED ORAL at 08:32

## 2021-06-25 RX ADMIN — METOPROLOL SUCCINATE 25 MG: 25 TABLET, EXTENDED RELEASE ORAL at 08:31

## 2021-06-25 RX ADMIN — PANTOPRAZOLE SODIUM 20 MG: 20 TABLET, DELAYED RELEASE ORAL at 05:36

## 2021-06-25 RX ADMIN — ENOXAPARIN SODIUM 40 MG: 40 INJECTION SUBCUTANEOUS at 08:31

## 2021-06-25 RX ADMIN — FERROUS SULFATE TAB 325 MG (65 MG ELEMENTAL FE) 325 MG: 325 (65 FE) TAB at 08:31

## 2021-06-25 RX ADMIN — CLOPIDOGREL BISULFATE 75 MG: 75 TABLET ORAL at 08:31

## 2021-06-25 RX ADMIN — SPIRONOLACTONE 25 MG: 25 TABLET, FILM COATED ORAL at 08:31

## 2021-06-25 RX ADMIN — FUROSEMIDE 40 MG: 10 INJECTION, SOLUTION INTRAMUSCULAR; INTRAVENOUS at 08:31

## 2021-06-25 RX ADMIN — SACUBITRIL AND VALSARTAN 1 TABLET: 24; 26 TABLET, FILM COATED ORAL at 08:31

## 2021-06-25 NOTE — PLAN OF CARE
Problem: MOBILITY - ADULT  Goal: Maintain or return to baseline ADL function  Description: INTERVENTIONS:  -  Assess patient's ability to carry out ADLs; assess patient's baseline for ADL function and identify physical deficits which impact ability to perform ADLs (bathing, care of mouth/teeth, toileting, grooming, dressing, etc )  - Assess/evaluate cause of self-care deficits   - Assess range of motion  - Assess patient's mobility; develop plan if impaired  - Assess patient's need for assistive devices and provide as appropriate  - Encourage maximum independence but intervene and supervise when necessary  - Involve family in performance of ADLs  - Assess for home care needs following discharge   - Consider OT consult to assist with ADL evaluation and planning for discharge  - Provide patient education as appropriate  Outcome: Progressing  Goal: Maintains/Returns to pre admission functional level  Description: INTERVENTIONS:  - Perform BMAT or MOVE assessment daily    - Set and communicate daily mobility goal to care team and patient/family/caregiver  - Collaborate with rehabilitation services on mobility goals if consulted  - Perform Range of Motion 4 times a day  - Reposition patient every 4 hours    - Dangle patient 4 times a day  - Stand patient 4 times a day  - Ambulate patient 4 times a day  - Out of bed to chair 3 times a day   - Out of bed for meals 3 times a day  - Out of bed for toileting  - Record patient progress and toleration of activity level   Outcome: Progressing     Problem: Potential for Falls  Goal: Patient will remain free of falls  Description: INTERVENTIONS:  - Educate patient/family on patient safety including physical limitations  - Instruct patient to call for assistance with activity   - Consult OT/PT to assist with strengthening/mobility   - Keep Call bell within reach  - Keep bed low and locked with side rails adjusted as appropriate  - Keep care items and personal belongings within reach  - Initiate and maintain comfort rounds  - Make Fall Risk Sign visible to staff  - Offer Toileting every 2 Hours, in advance of need  - Initiate/Maintain bed alarm  - Obtain necessary fall risk management equipment: alarms  - Apply yellow socks and bracelet for high fall risk patients  - Consider moving patient to room near nurses station  Outcome: Progressing     Problem: Prexisting or High Potential for Compromised Skin Integrity  Goal: Skin integrity is maintained or improved  Description: INTERVENTIONS:  - Identify patients at risk for skin breakdown  - Assess and monitor skin integrity  - Assess and monitor nutrition and hydration status  - Monitor labs   - Assess for incontinence   - Turn and reposition patient  - Assist with mobility/ambulation  - Relieve pressure over bony prominences  - Avoid friction and shearing  - Provide appropriate hygiene as needed including keeping skin clean and dry  - Evaluate need for skin moisturizer/barrier cream  - Collaborate with interdisciplinary team   - Patient/family teaching  - Consider wound care consult   Outcome: Progressing     Problem: Nutrition/Hydration-ADULT  Goal: Nutrient/Hydration intake appropriate for improving, restoring or maintaining nutritional needs  Description: Monitor and assess patient's nutrition/hydration status for malnutrition  Collaborate with interdisciplinary team and initiate plan and interventions as ordered  Monitor patient's weight and dietary intake as ordered or per policy  Utilize nutrition screening tool and intervene as necessary  Determine patient's food preferences and provide high-protein, high-caloric foods as appropriate       INTERVENTIONS:  - Monitor oral intake, urinary output, labs, and treatment plans  - Assess nutrition and hydration status and recommend course of action  - Evaluate amount of meals eaten  - Assist patient with eating if necessary   - Allow adequate time for meals  - Recommend/ encourage appropriate diets, oral nutritional supplements, and vitamin/mineral supplements  - Order, calculate, and assess calorie counts as needed  - Recommend, monitor, and adjust tube feedings and TPN/PPN based on assessed needs  - Assess need for intravenous fluids  - Provide specific nutrition/hydration education as appropriate  - Include patient/family/caregiver in decisions related to nutrition  Outcome: Progressing

## 2021-06-25 NOTE — ASSESSMENT & PLAN NOTE
Monitor hemoglobin as an outpatient  Patient does have history of prostate cancer    Patient will need to follow-up with Hematology/Oncology

## 2021-06-25 NOTE — ASSESSMENT & PLAN NOTE
In the setting of HF  Continue with the IV diuresis  Echocardiogram done  Results pending   e    Trend troponins as well  Improved with diuresis  Patient to go on Lasix 40 mg daily    Patient had a Life Vest fitted

## 2021-06-25 NOTE — CASE MANAGEMENT
Given patient's new Life Vest, new Entresto; CORKY asked patient if he would like VNA  Patient is interested in VNA with [facility] or with any available agency that accepts his insurance  Referral placed in [facility] [place] [facility]\"" to [facility]  Patient is accepted

## 2021-06-25 NOTE — ASSESSMENT & PLAN NOTE
Wt Readings from Last 3 Encounters:   06/25/21 99 7 kg (219 lb 12 8 oz)     No echo on our system  Reported EF of 20 % by ED  Echocardiogram confirms ejection fraction of 20%  Patient received 40 mg of Lasix b i d  In the hospital  Cardiology consult appreciated  Life vest has been ordered and fitted  Patient will go on Lasix 40 mg daily  Continue the [facility] as well

## 2021-06-25 NOTE — ASSESSMENT & PLAN NOTE
Was receiving chemo every 4 weeks in [facility]  Would like to establish care here     I did put the number in the chart for [facility]

## 2021-06-25 NOTE — DISCHARGE SUMMARY
[facility]  Discharge- Timmy Brothers Free 1945, 76 y o  male MRN: [id number]  Unit/Bed#: S -01 Encounter: [id number]  Primary Care Provider: No primary care provider on file  Date and time admitted to hospital: 6/22/2021  8:31 AM    Anemia  Assessment & Plan  Monitor hemoglobin as an outpatient  Patient does have history of prostate cancer  Patient will need to follow-up with Hematology/Oncology      S/P AVR  Assessment & Plan  S/p AVR in [address] [place] [address] more than a week ago  Did not receive his DC meds until 2 days ago  Will restart on discharge    * Acute on chronic diastolic CHF (congestive heart failure) (HCC)  Assessment & Plan  Wt Readings from Last 3 Encounters:   06/25/21 99 7 kg (219 lb 12 8 oz)     No echo on our system  Reported EF of 20 % by ED  Echocardiogram confirms ejection fraction of 20%  Patient received 40 mg of Lasix b i d  In the hospital  Cardiology consult appreciated  Life vest has been ordered and fitted  Patient will go on Lasix 40 mg daily  Continue the Piero Herter as well  Abdominal pain-resolved as of 6/25/2021  Assessment & Plan  Patient with abdominal pain and diarrhea  Patient states he takes something for that at home to help stop the diarrhea  Patient states that his pain was of 15/10 however when speaking to him he was resting comfortably  Abdomen was soft bowel sounds were present     However this pain has resolved    Dyspnea-resolved as of 6/25/2021  Assessment & Plan  In the setting of HF  Continue with the IV diuresis  Echocardiogram done  Results pending   e    Trend troponins as well  Improved with diuresis  Patient to go on Lasix 40 mg daily  Patient had a Life Vest fitted      Prostate cancer (HCC)-resolved as of 6/25/2021  Assessment & Plan  Was receiving chemo every 4 weeks in [facility]  Would like to establish care here     I did put the number in the chart for [facility]           Discharging Physician / Practitioner: Jacky Travis MD  PCP: No primary care provider on file  Admission Date:   Admission Orders (From admission, onward)     Ordered        06/22/21 1042  INPATIENT ADMISSION  Once                   Discharge Date: 06/25/21    Medical Problems     Resolved Problems  Date Reviewed: 6/25/2021        Resolved    Prostate cancer ([address] ) 6/25/2021     Resolved by  Jacky Travis MD    Dyspnea 6/25/2021     Resolved by  Jacky Travis MD    Abdominal pain 6/25/2021     Resolved by  Jacky Travis MD                Consultations During Hospital Stay:  · Cardiology    Procedures Performed:   · Echocardiogram showing ejection fraction of 20%    Significant Findings / Test Results:   · See above    Incidental Findings:   · None     Test Results Pending at Discharge (will require follow up): · None     Outpatient Tests Requested:  · Outpatient cardiology follow-up        Reason for Admission:  Shortness of breath  Hospital Course:     Inge Dias is a 76 y o  male patient who originally presented to the hospital on 6/22/2021 due to shortness of breath    History presenting illness:Royce Rene is a 76 y o  male with metastatic prostate cancer, status post aortic valve replacement just over week ago, who presents worsening dyspnea over the last 2-3 days acutely worse today       His daughter states that he was discharged from [facility] says following his valve replacement but that his prescriptions including Lasix were sent to remote pharmacy and did not arrive until several days after his procedure    For this reason use not been taking all of his medication      He also reports 5 lb weight gain but no chest pain currently although he does note chest pain on exertion at times      He reports some mild leg swelling as well      With regard to his prostate cancer he was receiving chemotherapy every 4 weeks in [place] at [facility] until he started exhibiting symptoms of shortness of breath 3 weeks ago and was referred for aortic valve replacement  Since then he has not had any treatment      Overall the patient is looking for as much care to be transitioned to [facility] here in the [facility] including cancer therapy    Hospital course:  Patient was admitted for the above reasons  The patient was found to be in acute systolic congestive heart failure  The patient had a recent procedure done as above  The patient was seen by Cardiology who recommended a LifeVest   However they did recommend diuresis while the patient was here and the patient did diurese pretty well  From that standpoint he is doing okay  LifeVest was fitted yesterday and he is okay for discharge today on p o  Diuretics  Patient was started on Entresto as well  Patient had some abdominal pain on day 1 of admission but that did resolve  Patient states that happens every now and then at home and improved with Imodium  Patient is eager and anxious to be discharged today  He is feeling significantly better when he came in  Please see above list of diagnoses and related plan for additional information  Condition at Discharge: good     Discharge Day Visit / Exam:     Subjective:  Patient seen examined  He states he is feeling well today    Vitals: Blood Pressure: 103/60 (06/25/21 0700)  Pulse: 91 (06/25/21 0700)  Temperature: 99 °F (37 2 °C) (06/25/21 0700)  Temp Source: Oral (06/25/21 0700)  Respirations: 16 (06/25/21 0700)  Height: 5' 7" (170 2 cm) (06/22/21 0830)  Weight - Scale: 99 7 kg (219 lb 12 8 oz) (06/25/21 0600)  SpO2: 90 % (06/25/21 0700)  Exam:   Physical Exam  (   General Appearance:    Alert, cooperative, no distress, appears stated age                               Lungs:     Clear to auscultation bilaterally, respirations unlabored       Heart:    Regular rate and rhythm, S1 and S2 normal, no murmur, rub    or gallop   Abdomen:     Soft, non-tender, bowel sounds active all four quadrants,     no masses, no organomegaly           Extremities:   Extremities normal, atraumatic, no cyanosis or edema     Discussion with Family:  Discussed with son over the phone    Discharge instructions/Information to patient and family:   See after visit summary for information provided to patient and family  Provisions for Follow-Up Care:  See after visit summary for information related to follow-up care and any pertinent home health orders  Disposition:     Home    For Discharges to [facility]:   · Not Applicable to this Patient - Not Applicable to this Patient    Planned Readmission:  Not anticipated     Discharge Statement:  I spent 35 minutes discharging the patient  This time was spent on the day of discharge  I had direct contact with the patient on the day of discharge  Greater than 50% of the total time was spent examining patient, answering all patient questions, arranging and discussing plan of care with patient as well as directly providing post-discharge instructions  Additional time then spent on discharge activities  Discharge Medications:  See after visit summary for reconciled discharge medications provided to patient and family        ** Please Note: This note has been constructed using a voice recognition system **

## 2021-06-25 NOTE — DISCHARGE INSTRUCTIONS
Take your medications as directed, and keep your follow up appointments  Adhere to a heart healthy lifestyle, maintaining a low sodium diet  Daily weight and record    If your weight increases 2-3 lbs in one day, or 5 lbs in 2 days, you are short of breath or have lower extremity swelling, please call Nurse Violeta Langley at  [place] [address] office  at [phone]

## 2021-06-25 NOTE — CASE MANAGEMENT
CM was notified by [facility] that a prescription for Maryse Pomona was sent down to [address] [address]  Cost to patient per Oliva Ades is free today with 's discount card, then $9 20 with each subsequent prescription  CM updated SLIM  CM met with patient at bedside to discuss IMM  CM notified patient of the cost of Entresto; patient is agreeable and reports that the cost is affordable and chooses to fill with [facility]  Daughter to transport home

## 2021-06-25 NOTE — ASSESSMENT & PLAN NOTE
S/p AVR in [facility] more than a week ago  Did not receive his DC meds until 2 days ago     Will restart on discharge

## 2021-06-28 NOTE — UTILIZATION REVIEW
Notification of Discharge   This is a Notification of Discharge from our facility [address]  Please be advised that this patient has been discharge from our facility  Below you will find the admission and discharge date and time including the patients disposition  UTILIZATION REVIEW CONTACT:  Aida Beasley  Utilization   Network Utilization Review Department  Phone: [phone] x carefully listen to the prompts  All voicemails are confidential   Email: [email or web address]     PHYSICIAN ADVISORY SERVICES:  FOR [id number] REVIEW - MEDICAL NECESSITY DENIAL  Phone: [phone]  Fax: [phone]  Email: [email or web address]     PRESENTATION DATE: 6/22/2021  8:31 AM  OBERVATION ADMISSION DATE:   INPATIENT ADMISSION DATE: 6/22/21 10:42 AM   DISCHARGE DATE: 6/25/2021  1:18 PM  DISPOSITION: Home with [place] with [address] [place] [address] Road INFORMATION:  Send all requests for admission clinical reviews, approved or denied determinations and any other requests to dedicated fax number below belonging to the campus where the patient is receiving treatment   List of dedicated fax numbers:  [address] DENIALS (Administrative/Medical Necessity) [phone]   [address] [place] [address] (Maternity/NICU/Pediatrics) [phone]   [facility] [phone]   [facility] [phone]   Faisal Neal [phone]   [facility] 1525 [facility] [phone]   [facility]  [phone]   [address] [facility], [facility]  2401 [facility] [address] [facility] [phone]

## 2021-06-29 ENCOUNTER — APPOINTMENT (OUTPATIENT)
Dept: LAB | Facility: HOSPITAL | Age: 76
End: 2021-06-29
Attending: FAMILY MEDICINE
Payer: COMMERCIAL

## 2021-06-29 ENCOUNTER — TELEPHONE (OUTPATIENT)
Dept: INTERNAL MEDICINE CLINIC | Facility: CLINIC | Age: 76
End: 2021-06-29

## 2021-06-29 DIAGNOSIS — I50.9 ACUTE CONGESTIVE HEART FAILURE, UNSPECIFIED HEART FAILURE TYPE (HCC): ICD-10-CM

## 2021-06-29 LAB
ANION GAP SERPL CALCULATED.3IONS-SCNC: 14 MMOL/L (ref 4–13)
BUN SERPL-MCNC: 21 MG/DL (ref 6–20)
CALCIUM SERPL-MCNC: 9.8 MG/DL (ref 8.4–10.2)
CHLORIDE SERPL-SCNC: 101 MMOL/L (ref 96–108)
CO2 SERPL-SCNC: 22 MMOL/L (ref 22–33)
CREAT SERPL-MCNC: 1.09 MG/DL (ref 0.5–1.2)
GFR SERPL CREATININE-BSD FRML MDRD: 66 ML/MIN/1.73SQ M
GLUCOSE P FAST SERPL-MCNC: 97 MG/DL (ref 70–105)
POTASSIUM SERPL-SCNC: 4.4 MMOL/L (ref 3.5–5)
SODIUM SERPL-SCNC: 137 MMOL/L (ref 133–145)

## 2021-06-29 PROCEDURE — 36415 COLL VENOUS BLD VENIPUNCTURE: CPT

## 2021-06-29 PROCEDURE — 80048 BASIC METABOLIC PNL TOTAL CA: CPT

## 2021-06-29 NOTE — TELEPHONE ENCOUNTER
Mariano from the [facility] has called the office in regards to [facility]  Mariano had requested an order for a  be placed for transportation needs  Informed Romain Frias has not been seen by the IM office  Instead informed Mariano to call [facility], [place], to see if transportation can be arranged

## 2021-06-30 ENCOUNTER — TELEPHONE (OUTPATIENT)
Dept: INTERNAL MEDICINE CLINIC | Facility: CLINIC | Age: 76
End: 2021-06-30

## 2021-06-30 NOTE — TELEPHONE ENCOUNTER
Royce Anju has left the IM office a voicemail to be called back  Called [facility] to ask for reason for call  Anju stated he has set up transportation for his appointment tomorrow, 07/01/21  Anju stated the transportation will bring him into the IM office at 11:00AM  Rescheduled Anju's appointment at 1:00PM with Dr Shamir Mata to 11:00AM with Dr Marcelino Rene was satisfied with the change, and ended the call soon after

## 2021-07-01 ENCOUNTER — OFFICE VISIT (OUTPATIENT)
Dept: INTERNAL MEDICINE CLINIC | Facility: CLINIC | Age: 76
End: 2021-07-01
Payer: COMMERCIAL

## 2021-07-01 VITALS
WEIGHT: 217.8 LBS | RESPIRATION RATE: 20 BRPM | OXYGEN SATURATION: 100 % | TEMPERATURE: 96.6 F | DIASTOLIC BLOOD PRESSURE: 60 MMHG | SYSTOLIC BLOOD PRESSURE: 84 MMHG | BODY MASS INDEX: 33.01 KG/M2 | HEIGHT: 68 IN | HEART RATE: 86 BPM

## 2021-07-01 DIAGNOSIS — G89.29 CHRONIC MIDLINE LOW BACK PAIN WITHOUT SCIATICA: ICD-10-CM

## 2021-07-01 DIAGNOSIS — I50.33 ACUTE ON CHRONIC DIASTOLIC CHF (CONGESTIVE HEART FAILURE) (HCC): Primary | ICD-10-CM

## 2021-07-01 DIAGNOSIS — M54.50 CHRONIC MIDLINE LOW BACK PAIN WITHOUT SCIATICA: ICD-10-CM

## 2021-07-01 PROBLEM — I25.5 ISCHEMIC CARDIOMYOPATHY: Status: ACTIVE | Noted: 2021-07-01

## 2021-07-01 PROCEDURE — 99496 TRANSJ CARE MGMT HIGH F2F 7D: CPT | Performed by: HOSPITALIST

## 2021-07-01 RX ORDER — LEUPROLIDE ACETATE 22.5 MG
KIT SUBCUTANEOUS
COMMUNITY
Start: 2021-03-31

## 2021-07-01 RX ORDER — PREDNISONE 1 MG/1
5 TABLET ORAL DAILY
COMMUNITY
End: 2021-07-07

## 2021-07-01 RX ORDER — ABIRATERONE ACETATE 250 MG/1
TABLET ORAL
COMMUNITY
End: 2021-08-16 | Stop reason: SDUPTHER

## 2021-07-01 NOTE — ASSESSMENT & PLAN NOTE
Wt Readings from Last 3 Encounters:   07/01/21 98 8 kg (217 lb 12 8 oz)   06/25/21 99 7 kg (219 lb 12 8 oz)     ·  most recent echo on 06/23/2021 showed LVEF of 20%  ·  Patient was prescribed a LifeVest, currently not using  ·  patient already has a cardiology follow-up in 1 week  ·  discussed with patient the importance of medical compliance, encouraged him to use the LifeVest given high chance of sudden cardiac arrest and until he re-evaluated by Cardiology for possible ICD placement    ·  he was complaining of bilateral lower extremity claudications and pain on ambulation, bilateral lower extremity Dopplers will be orders for suspected PAD   ·  no changes on his current medical regimen

## 2021-07-01 NOTE — PROGRESS NOTES
Assessment/Plan:    Acute on chronic diastolic CHF (congestive heart failure) ([facility] )  Wt Readings from Last 3 Encounters:   07/01/21 98 8 kg (217 lb 12 8 oz)   06/25/21 99 7 kg (219 lb 12 8 oz)     ·  most recent echo on 06/23/2021 showed LVEF of 20%  ·  Patient was prescribed a LifeVest, currently not using  ·  patient already has a cardiology follow-up in 1 week  ·  discussed with patient the importance of medical compliance, encouraged him to use the LifeVest given high chance of sudden cardiac arrest and until he re-evaluated by Cardiology for possible ICD placement  ·  he was complaining of bilateral lower extremity claudications and pain on ambulation, bilateral lower extremity Dopplers will be orders for suspected PAD   ·  no changes on his current medical regimen    Chronic midline low back pain  ·  Chronic midline lower back pain secondary to metastatic prostate cancer S/P  Radiation therapy  · on exam there is no ulceration, no trauma or injury  ·  continue current regimen with Tylenol, tramadol as needed, will consider  low-dose steroids if pain continued to be  uncontrolled  ·  hematology/oncology evaluation    Prostate cancer ([place] [address] )  · S/P  Chemotherapy and radiation  ·  will obtain medical records from patient's facility at [place]  ·  establish care with hematology/oncology    Ischemic cardiomyopathy  ·  Plan as outlined above       Diagnoses and all orders for this visit:    Acute on chronic diastolic CHF (congestive heart failure) (HCC)    Chronic midline low back pain without sciatica    Other orders  -     abiraterone (ZYTIGA) 250 mg tablet; Take by mouth  -     metoprolol tartrate (LOPRESSOR) 25 mg tablet; TAKE ONE TABLET BY MOUTH TWICE A DAY FOR HEART AND BLOOD PRESSURE  -     leuprolide (Eligard) 22 5 mg; 22 5MG/1 SYRINGE UNDER THE SKIN ONE TIME AS DIRECTED EVERY 3 MONTHS  -     predniSONE 5 mg tablet; Take 5 mg by mouth daily  -     Cancel: Ambulatory referral to Cardiology;  Future  - Cancel: Ambulatory referral to Hematology / Oncology; Future  -     Ambulatory referral to social work care management program; Future  -     VAS lower limb arterial duplex, complete bilateral; Future          Subjective:      Patient ID: Tracy Mancilla is a 76 y o  male  With a medical history significant chronic CHF, previous CVA, aortic stenosis  S/P AVR ,  Prostate cancer s/p  Chemotherapy and Radiation therapy  Patient Was recently hospitalized for acute decompensated CHF, during his hospital stay repeated echocardiogram showed an LVEF of 20% with wall motion abnormalities suggestive for ischemic cardiomyopathy  On today's visit he denies any shortness of breath, palpitations, chest pain, lower extremity swelling, syncope  Patient was discharged with a LifeVest until he meets with Cardiology to further evaluate for an ICD placement  He reports being noncompliant with wearing the LifeVest   He reports bilateral lower extremity claudications in pain while ambulation  Patient also reports difficulties on taking care of himself at home or go to his medical visits, his currently lives with his son works most of the day  He will need social work, case management assistance  most of the patient care was at [facility] in [place], will consider obtaining medical records  The following portions of the patient's history were reviewed and updated as appropriate: allergies, current medications, past family history, past medical history, past social history, past surgical history and problem list     Review of Systems   Constitutional: Positive for activity change and fatigue  Negative for chills and diaphoresis  Respiratory: Negative for cough, chest tightness, shortness of breath and wheezing  Cardiovascular: Negative for chest pain, palpitations and leg swelling  Gastrointestinal: Negative for abdominal pain, constipation, nausea and vomiting     Genitourinary: Negative for dysuria and hematuria  Musculoskeletal: Positive for back pain  Skin: Positive for color change and pallor  Neurological: Positive for light-headedness  Negative for dizziness, syncope and headaches  Psychiatric/Behavioral: Negative for confusion  The patient is not nervous/anxious  Objective:      BP (!) 84/60 (BP Location: Right arm, Patient Position: Sitting, Cuff Size: Large)   Pulse 86   Temp (!) 96 6 °F (35 9 °C)   Resp 20   Ht 5' 7 75" (1 721 m)   Wt 98 8 kg (217 lb 12 8 oz)   SpO2 100%   BMI 33 36 kg/m²          Physical Exam  Vitals reviewed  Constitutional:       Appearance: He is ill-appearing  HENT:      Head: Normocephalic and atraumatic  Mouth/Throat:      Mouth: Mucous membranes are dry  Pharynx: No oropharyngeal exudate or posterior oropharyngeal erythema  Cardiovascular:      Rate and Rhythm: Normal rate and regular rhythm  Pulses: Decreased pulses (  Bilateral lower extremity RT>LT)  Heart sounds: No murmur heard  Pulmonary:      Effort: No respiratory distress  Breath sounds: No wheezing or rales  Abdominal:      General: There is no distension  Palpations: Abdomen is soft  Tenderness: There is no abdominal tenderness  Musculoskeletal:      Right lower leg: No edema  Left lower leg: No edema  Skin:     Capillary Refill: Capillary refill takes 2 to 3 seconds  Coloration: Skin is pale  Neurological:      General: No focal deficit present  Mental Status: He is alert and oriented to person, place, and time     Psychiatric:         Mood and Affect: Mood normal          Behavior: Behavior normal

## 2021-07-01 NOTE — ASSESSMENT & PLAN NOTE
·  Chronic midline lower back pain secondary to metastatic prostate cancer S/P  Radiation therapy  · on exam there is no ulceration, no trauma or injury  ·  continue current regimen with Tylenol, tramadol as needed, will consider  low-dose steroids if pain continued to be  uncontrolled  ·  hematology/oncology evaluation

## 2021-07-01 NOTE — ASSESSMENT & PLAN NOTE
· S/P  Chemotherapy and radiation  ·  will obtain medical records from patient's facility at [place]  ·  establish care with hematology/oncology

## 2021-07-02 ENCOUNTER — DOCUMENTATION (OUTPATIENT)
Dept: OTHER | Facility: HOSPITAL | Age: 76
End: 2021-07-02

## 2021-07-02 DIAGNOSIS — H10.9 CONJUNCTIVITIS: Primary | ICD-10-CM

## 2021-07-02 RX ORDER — CIPROFLOXACIN HYDROCHLORIDE 3.5 MG/ML
1 SOLUTION/ DROPS TOPICAL
Qty: 5 ML | Refills: 0 | Status: SHIPPED | OUTPATIENT
Start: 2021-07-02 | End: 2021-07-13 | Stop reason: HOSPADM

## 2021-07-02 NOTE — PROGRESS NOTES
Contacted by [facility] services Jean Orr ) for MR free , requesting to place a  order and to inform me that he is complaining of redness and swelling of left eye with yellowish discharge that suggestive for possible conjunctivitis  will prescribe the patient an antibiotic eyedrops, will follow-up and there is no improvement will see the patient at the clinic

## 2021-07-06 ENCOUNTER — OFFICE VISIT (OUTPATIENT)
Dept: CARDIOLOGY CLINIC | Facility: CLINIC | Age: 76
End: 2021-07-06
Payer: COMMERCIAL

## 2021-07-06 VITALS
WEIGHT: 214.5 LBS | DIASTOLIC BLOOD PRESSURE: 48 MMHG | BODY MASS INDEX: 33.67 KG/M2 | OXYGEN SATURATION: 96 % | HEART RATE: 86 BPM | HEIGHT: 67 IN | SYSTOLIC BLOOD PRESSURE: 68 MMHG

## 2021-07-06 DIAGNOSIS — I25.10 CORONARY ARTERY DISEASE INVOLVING NATIVE CORONARY ARTERY OF NATIVE HEART WITHOUT ANGINA PECTORIS: ICD-10-CM

## 2021-07-06 DIAGNOSIS — I95.9 SYMPTOMATIC HYPOTENSION: ICD-10-CM

## 2021-07-06 DIAGNOSIS — E78.5 HYPERLIPIDEMIA, UNSPECIFIED HYPERLIPIDEMIA TYPE: ICD-10-CM

## 2021-07-06 DIAGNOSIS — I50.22 CHRONIC HFREF (HEART FAILURE WITH REDUCED EJECTION FRACTION) (HCC): Primary | ICD-10-CM

## 2021-07-06 DIAGNOSIS — I42.9 CARDIOMYOPATHY, UNSPECIFIED TYPE (HCC): ICD-10-CM

## 2021-07-06 DIAGNOSIS — Z09 HOSPITAL DISCHARGE FOLLOW-UP: ICD-10-CM

## 2021-07-06 DIAGNOSIS — Z95.2 S/P TAVR (TRANSCATHETER AORTIC VALVE REPLACEMENT): ICD-10-CM

## 2021-07-06 DIAGNOSIS — I50.9 ACUTE CONGESTIVE HEART FAILURE, UNSPECIFIED HEART FAILURE TYPE (HCC): ICD-10-CM

## 2021-07-06 DIAGNOSIS — C61 PRIMARY PROSTATE CANCER WITH METASTASIS FROM PROSTATE TO OTHER SITE (HCC): ICD-10-CM

## 2021-07-06 PROCEDURE — 1160F RVW MEDS BY RX/DR IN RCRD: CPT | Performed by: PHYSICIAN ASSISTANT

## 2021-07-06 PROCEDURE — 99213 OFFICE O/P EST LOW 20 MIN: CPT | Performed by: PHYSICIAN ASSISTANT

## 2021-07-06 PROCEDURE — 1036F TOBACCO NON-USER: CPT | Performed by: PHYSICIAN ASSISTANT

## 2021-07-06 RX ORDER — METOPROLOL SUCCINATE 25 MG/1
12.5 TABLET, EXTENDED RELEASE ORAL DAILY
Qty: 30 TABLET | Refills: 0
Start: 2021-07-06 | End: 2021-07-13 | Stop reason: HOSPADM

## 2021-07-06 NOTE — PROGRESS NOTES
Advanced Heart Failure / Pulmonary Hypertension Outpatient Consultation    Al Rene 76 y o  male   MRN: [id number]  Encounter: [id number]    Assessment:  Patient Active Problem List    Diagnosis Date Noted    Chronic midline low back pain 07/01/2021    Ischemic cardiomyopathy 07/01/2021    Acute on chronic diastolic CHF (congestive heart failure) ([facility] ) 06/22/2021    S/P AVR 06/22/2021    Anemia 06/22/2021       Today's Plan:   Recommend patient proceed to ED for further evaluation of symptomatic hypotension (MAP in low 50s) and worsening right LE pain and numbness  ADT21 order placed   Patient hesitant to go to ED today  States he will consider going tomorrow if continues to feel poorly   In the meantime, will discontinue spironolactone and Entresto, decrease metoprolol succinate to 12 5 mg daily, and remove metoprolol tartrate from medication list  Unclear if patient has been taking both tartrate and succinate since discharge   Post hospital BMP and CBC ordered   Plan to check limited TTE in mid September 2021 to reassess LVEF   Recommend considering alternative chemotherapy agent to abiraterone as medication's safety in patients with LVEF <50% not established per FDA   Complete LE arterial duplex has been ordered by PCP  Plan:  Newly diagnosed HFrEF; LVEF 20%; LVIDd 5 9 cm; NYHA III; ACC/AHA Stage C   Etiology: ischemic +/- progression of valvular disease +/- chemotherapy (on abiraterone and leuprolide)  Safety of abiraterone in patients with LVEF <50% not established per FDA  Biddle-analysis of abiraterone noted increased incidence and relative risk of CV toxicity  TTE 06/14/2021 (at [facility] pre- and post-TAVR, per report): LVEF 20%  LVIDd 5 4 cm  "entire apex, mid and apical anterior septum, mid and apical inferior septum, mid and apical inferior wall, mid inferolateral wall, and mid lateral wall are akinetic " Trace MR and TR  Pre-TAVR: LVOT VTI 12 0 cm  JOSE 0 47 cm^2   Post: bioprosthetic AV valve present; JOSE 2 41 cm^2  TTE 06/23/2021: LVEF 20%  LVIDd 5 9 cm  Grade 2 DD  "dyskinesis and aneurysmal deformity of the apical anterior, mid anteroseptal, apical inferior, apical septal, and apical wall(s) " Normal RV size and RVSF  Mild MR  AV bioprosthesis with normal leaflet separation  Trace TR  Reviewed importance of low sodium diet and fluid restriction  Weight of 219 lbs on 06/25 (day of discharge)  Today, weighs 214 lbs  Most recent BMP from 06/29/2021: sodium 137; potassium 4 4; BUN 14; creatinine 1 09; eGFR 66  Neurohormonal Blockade:  --Beta Blocker: metoprolol succinate 12 5 mg daily  --ARNi / ACEi / ARB: No    --Aldosterone Antagonist: No    --SGLT2 Inhibitor: No    --Diuretic: Lasix 40 mg daily  Sudden Cardiac Death Risk Reduction:  --LVEF 20%  Not wearing LifeVest; left at home  --Plan to check limited TTE in mid September 2021 to reassess LVEF  Electrical Resynchronization:  --Candidacy for BiV device: LBBB with  ms  Advanced Therapies: Will continue to monitor  Prostate cancer with bony metastases   Receiving chemotherapy through the [place]  Taking SQ leuprolide (Eligard) and abiraterone (Zytiga) - safety of abiraterone in patients with LVEF <50% not established per FDA  Pinch-analysis of drug noted increased incidence and relative risk of CV toxicity  Coronary artery disease   Without active chest pain  S/pstenting to circumflex and RCA in 2001  [facility] 06/09/2021 (per documentation):"diffuse moderate prox and mid disease/ISR, severe OM lesion and total mid LAD "   Continue on Plavix, statin, and BB as above  Aortic stenosis   S/p TAVR (Evolut Pro+ 29mm) on 06/14/2021 at [facility]  Continues on Plavix  Hyperlipidemia   Unable to find lipid panel after chart review  Continue on atorvastatin 80 mg daily  History of atrial tachycardia: s/p ablation at OSH in 2018    Benign prostatic hyperplasia    HPI:   Poppy Rene is a 70-year-old man with a PMH as aboe who presents for hospital follow-up and to establish care  Presented to [facility] on 06/03/2021 with chest pressure before chemotherapy treatment for prostate cancer  Sent for testing and noted to be in acute CHF exacerbation and was diuresed  Was then transferred to [facility] [place] [facility]  TTE showed "severely reduced LVEF with LV apical aneurysm and septal/AW AK, RV normal, Vmax at 3 5 (however, it was read as moderate AS and preserved EF in 2019 so likely this is progression of AS in addition to interim AWMI) "  LHC also done which revealed "diffuse moderate prox and mid disease/ISR, severe OM lesion and total mid LAD "    Transferred to [facility] on 06/13 for TAVR  TAVR completed on 06/14, and patient was discharged home on 06/15/2021  Started on metoprolol succinate this admission (tartrate discontinued)  Admitted to [facility] from 06/22 to 06/25/2021 after presenting with worsening SOB and LE edema for 3 days  WAS without discharge medications from [facility] admission for about 1 week (including Lasix)  NT-proBNP 5157  Cardiology was consulted, TTE was completed, and IV diuresis was started  Reduced LVEF was confirmed and aneurysmal wall motion of apical segments  Transitioned to PO diuretics on day of discharge  Started on Entresto and fit with LifeVest  Lost 10 lbs this admission  07/06/2021: Patient presents for hospital follow-up  Primary complaint today is of dizziness and right LE pain/numbeness and right foot wound  Reports decreased appetite and increased frustration with attempting to adhere to low sodium diet  Did receive "Living With Heart Failure" booklet during admission  Prior to admissions, often would eat canned pastas for his meals  Considering getting Meals of Wheels  Unclear if patient has been taking both tartrate and succinate since discharge  Not wearing LifeVest; dislikes wearing it and left device at home today   Has not been completing daily weights; does not have scale at home  Patient organizes his own meds; VNA in home 2 times weekly  Past Medical History:   Diagnosis Date    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 )        Review of Systems   Constitutional: Positive for appetite change  Negative for activity change, fatigue, fever and unexpected weight change  HENT: Negative for congestion, postnasal drip, rhinorrhea, sneezing, sore throat and trouble swallowing  Eyes: Negative  Respiratory: Positive for shortness of breath  Negative for cough and chest tightness  Cardiovascular: Negative for chest pain, palpitations and leg swelling  Gastrointestinal: Negative for abdominal distention, abdominal pain, anal bleeding, blood in stool, diarrhea, nausea and vomiting  Endocrine: Negative  Genitourinary: Negative for decreased urine volume, difficulty urinating, dysuria, hematuria and urgency  Musculoskeletal: Positive for arthralgias, back pain and myalgias  Skin: Negative  Allergic/Immunologic: Negative  Neurological: Positive for dizziness, light-headedness and numbness (right leg)  Negative for tremors, syncope, weakness and headaches  Hematological: Negative  Psychiatric/Behavioral: Negative for agitation, confusion and sleep disturbance  The patient is not nervous/anxious  14-point ROS completed and negative except as stated above and/or in the HPI      No Known Allergies    Current Outpatient Medications:     abiraterone (ZYTIGA) 250 mg tablet, Take by mouth, Disp: , Rfl:     atorvastatin (LIPITOR) 80 mg tablet, Take 1 tablet (80 mg total) by mouth daily with dinner, Disp: 30 tablet, Rfl: 0    ciprofloxacin (CILOXAN) 0 3 % ophthalmic solution, Administer 1 drop to both eyes every 2 (two) hours, Disp: 5 mL, Rfl: 0    clopidogrel (PLAVIX) 75 mg tablet, TAKE ONE TABLET BY MOUTH DAILY AS BLOOD THINNER, Disp: , Rfl:     ferrous sulfate 325 (65 Fe) mg tablet, Take 325 mg by mouth 2 (two) times a day with meals, Disp: , Rfl:     furosemide (LASIX) 40 mg tablet, Take 1 tablet (40 mg total) by mouth daily, Disp: 30 tablet, Rfl: 0    leuprolide (Eligard) 22 5 mg, 22 5MG/1 SYRINGE UNDER THE SKIN ONE TIME AS DIRECTED EVERY 3 MONTHS, Disp: , Rfl:     loperamide (IMODIUM) 2 mg capsule, Take 4 mg by mouth 4 (four) times a day as needed for diarrhea, Disp: , Rfl:     metoprolol succinate (TOPROL-XL) 25 mg 24 hr tablet, Take 0 5 tablets (12 5 mg total) by mouth daily, Disp: 30 tablet, Rfl: 0    omeprazole (PriLOSEC) 20 mg delayed release capsule, Take 20 mg by mouth daily, Disp: , Rfl:     predniSONE 5 mg tablet, Take 5 mg by mouth daily, Disp: , Rfl:     tamsulosin (FLOMAX) 0 4 mg, 0 4 mg, Disp: , Rfl:     traMADol (ULTRAM) 50 mg tablet, Take 50 mg by mouth every 6 (six) hours as needed for moderate pain (Patient not taking: Reported on 2021), Disp: , Rfl:     Social History     Socioeconomic History    Marital status:      Spouse name: Not on file    Number of children: 3    Years of education: Not on file    Highest education level: Not on file   Occupational History    Not on file   Tobacco Use    Smoking status: Former Smoker     Years: 20 00     Quit date: 2002     Years since quittin 0    Smokeless tobacco: Never Used   Vaping Use    Vaping Use: Never used   Substance and Sexual Activity    Alcohol use: Not Currently    Drug use: Not on file    Sexual activity: Not on file   Other Topics Concern    Not on file   Social History Narrative    Not on file     Social Determinants of Health     Financial Resource Strain:     Difficulty of Paying Living Expenses:    Food Insecurity:     Worried About [address] in the Last Year:     [address]OS in the Last Year:    Transportation Needs:     Lack of Transportation (Medical):      Lack of Transportation (Non-Medical):    Physical Activity:     Days of Exercise per Week:     Minutes of Exercise per Session:    Stress:     Feeling of Stress :    Social Connections:     Frequency of Communication with Friends and Family:     Frequency of Social Gatherings with Friends and Family:     Attends [employer] Services:     Active Member of Clubs or Organizations:     Attends Club or Organization Meetings:     Marital Status:    Intimate Partner Violence:     Fear of Current or Ex-Partner:     Emotionally Abused:     Physically Abused:     Sexually Abused:      History reviewed  No pertinent family history  Vitals:  Blood pressure (!) 68/48, pulse 86, height 5' 7" (1 702 m), weight 97 3 kg (214 lb 8 oz), SpO2 96 %  Body mass index is 33 6 kg/m²  Wt Readings from Last 3 Encounters:   07/06/21 97 3 kg (214 lb 8 oz)   07/01/21 98 8 kg (217 lb 12 8 oz)   06/25/21 99 7 kg (219 lb 12 8 oz)     Vitals:    07/06/21 1317 07/06/21 1354   BP: (!) 70/50 (!) 68/48   BP Location: Right arm Left arm   Patient Position: Sitting Sitting   Cuff Size: Standard Large   Pulse: 86    SpO2: 96%    Weight: 97 3 kg (214 lb 8 oz)    Height: 5' 7" (1 702 m)        Physical Exam  Vitals reviewed  Constitutional:       General: He is awake  He is not in acute distress  Appearance: Normal appearance  He is well-developed and overweight  HENT:      Head: Normocephalic  Nose: Nose normal       Mouth/Throat:      Mouth: Mucous membranes are moist    Eyes:      General: No scleral icterus  Conjunctiva/sclera: Conjunctivae normal    Neck:      Vascular: No JVD  Trachea: No tracheal deviation  Cardiovascular:      Rate and Rhythm: Normal rate and regular rhythm  No extrasystoles are present  Pulses: Decreased pulses (right LE)  Heart sounds: Murmur heard  Comments: Cool right LE  Pulmonary:      Effort: Pulmonary effort is normal  No tachypnea, bradypnea, accessory muscle usage or respiratory distress  Breath sounds: Decreased air movement present   Examination of the right-lower field reveals decreased breath sounds  Decreased breath sounds present  No rhonchi or rales  Abdominal:      General: Bowel sounds are normal  There is no distension  Palpations: Abdomen is soft  Tenderness: There is no abdominal tenderness  Musculoskeletal:      Cervical back: Neck supple  Right lower leg: No edema  Left lower leg: No edema  Skin:     General: Skin is warm and dry  Coloration: Skin is ashen and pale  Skin is not jaundiced  Neurological:      General: No focal deficit present  Mental Status: He is alert  Comments: Confused at times; repeating similar questions    Psychiatric:         Attention and Perception: Attention normal          Mood and Affect: Mood and affect normal          Speech: Speech normal          Behavior: Behavior normal  Behavior is cooperative  Thought Content: Thought content normal      Labs & Results:  Lab Results   Component Value Date    WBC 3 35 (L) 06/23/2021    HGB 9 9 (L) 06/23/2021    HCT 32 8 (L) 06/23/2021    MCV 84 06/23/2021     06/23/2021     Lab Results   Component Value Date    SODIUM 137 06/29/2021    K 4 4 06/29/2021     06/29/2021    CO2 22 06/29/2021    BUN 21 (H) 06/29/2021    CREATININE 1 09 06/29/2021    GLUC 93 06/24/2021    CALCIUM 9 8 06/29/2021     Lab Results   Component Value Date    INR 1 05 06/22/2021    PROTIME 13 8 06/22/2021     Lab Results   Component Value Date    NTBNP 5,157 (H) 06/22/2021      EKG personally reviewed by MELODIE Walker PA-C

## 2021-07-06 NOTE — PATIENT INSTRUCTIONS
Go to the emergency room for further assess you low blood pressure and leg pain     If you do not want to go, then you need to complete blood work tomorrow  Stop taking metoprolol tartrate, Entresto, and spironolactone  Decrease metoprolol succinate to 1/2 pill daily (12 5 mg)  Please weigh yourself every day and keep a detailed log of weights  Contact the Heart Failure program at [phone] if you gain 3 lbs overnight or 5 lbs in 5-7 days  Limit daily sodium/salt intake to 4840-8490 mg daily to prevent fluid retention  Avoid canned foods, [employer] food, and processed meat (hot dogs, lunch meat, and sausage etc )  Limit fluid intake to 2000 mL or 2L (about 60-65 ounces) daily  Bring complete list of medications and log of daily weights to your follow-up appointment

## 2021-07-07 ENCOUNTER — APPOINTMENT (EMERGENCY)
Dept: RADIOLOGY | Facility: HOSPITAL | Age: 76
DRG: 253 | End: 2021-07-07
Payer: COMMERCIAL

## 2021-07-07 ENCOUNTER — APPOINTMENT (EMERGENCY)
Dept: NON INVASIVE DIAGNOSTICS | Facility: HOSPITAL | Age: 76
DRG: 253 | End: 2021-07-07
Payer: COMMERCIAL

## 2021-07-07 ENCOUNTER — APPOINTMENT (INPATIENT)
Dept: RADIOLOGY | Facility: HOSPITAL | Age: 76
DRG: 253 | End: 2021-07-07
Payer: COMMERCIAL

## 2021-07-07 ENCOUNTER — HOSPITAL ENCOUNTER (INPATIENT)
Facility: HOSPITAL | Age: 76
LOS: 6 days | Discharge: HOME HEALTH | DRG: 253 | End: 2021-07-13
Attending: EMERGENCY MEDICINE | Admitting: FAMILY MEDICINE
Payer: COMMERCIAL

## 2021-07-07 DIAGNOSIS — I25.5 ISCHEMIC CARDIOMYOPATHY: ICD-10-CM

## 2021-07-07 DIAGNOSIS — I50.32 CHRONIC DIASTOLIC CHF (CONGESTIVE HEART FAILURE) (HCC): ICD-10-CM

## 2021-07-07 DIAGNOSIS — I99.8 ISCHEMIC LEG PAIN: Primary | ICD-10-CM

## 2021-07-07 DIAGNOSIS — M79.606 ISCHEMIC LEG PAIN: Primary | ICD-10-CM

## 2021-07-07 DIAGNOSIS — I73.9 PAD (PERIPHERAL ARTERY DISEASE) (HCC): ICD-10-CM

## 2021-07-07 PROBLEM — I50.42 CHRONIC COMBINED SYSTOLIC AND DIASTOLIC CHF (CONGESTIVE HEART FAILURE) (HCC): Status: ACTIVE | Noted: 2021-06-22

## 2021-07-07 PROBLEM — C61 PROSTATE CANCER (HCC): Status: ACTIVE | Noted: 2021-07-07

## 2021-07-07 LAB
ALBUMIN SERPL BCP-MCNC: 3.7 G/DL (ref 3.5–5)
ALP SERPL-CCNC: 60 U/L (ref 46–116)
ALT SERPL W P-5'-P-CCNC: 31 U/L (ref 12–78)
ANION GAP SERPL CALCULATED.3IONS-SCNC: 10 MMOL/L (ref 4–13)
APTT PPP: 27 SECONDS (ref 23–37)
APTT PPP: 27 SECONDS (ref 23–37)
APTT PPP: >210 SECONDS (ref 23–37)
AST SERPL W P-5'-P-CCNC: 21 U/L (ref 5–45)
BASOPHILS # BLD AUTO: 0.05 THOUSANDS/ΜL (ref 0–0.1)
BASOPHILS NFR BLD AUTO: 1 % (ref 0–1)
BILIRUB SERPL-MCNC: 1.2 MG/DL (ref 0.2–1)
BUN SERPL-MCNC: 29 MG/DL (ref 5–25)
CALCIUM SERPL-MCNC: 9.4 MG/DL (ref 8.3–10.1)
CHLORIDE SERPL-SCNC: 100 MMOL/L (ref 100–108)
CO2 SERPL-SCNC: 19 MMOL/L (ref 21–32)
CREAT SERPL-MCNC: 1.28 MG/DL (ref 0.6–1.3)
EOSINOPHIL # BLD AUTO: 0.1 THOUSAND/ΜL (ref 0–0.61)
EOSINOPHIL NFR BLD AUTO: 2 % (ref 0–6)
ERYTHROCYTE [DISTWIDTH] IN BLOOD BY AUTOMATED COUNT: 15.9 % (ref 11.6–15.1)
ERYTHROCYTE [DISTWIDTH] IN BLOOD BY AUTOMATED COUNT: 15.9 % (ref 11.6–15.1)
GFR SERPL CREATININE-BSD FRML MDRD: 54 ML/MIN/1.73SQ M
GLUCOSE SERPL-MCNC: 97 MG/DL (ref 65–140)
HCT VFR BLD AUTO: 35.1 % (ref 36.5–49.3)
HCT VFR BLD AUTO: 35.5 % (ref 36.5–49.3)
HGB BLD-MCNC: 11.1 G/DL (ref 12–17)
HGB BLD-MCNC: 11.2 G/DL (ref 12–17)
IMM GRANULOCYTES # BLD AUTO: 0.03 THOUSAND/UL (ref 0–0.2)
IMM GRANULOCYTES NFR BLD AUTO: 1 % (ref 0–2)
INR PPP: 1.04 (ref 0.84–1.19)
INR PPP: 1.05 (ref 0.84–1.19)
LYMPHOCYTES # BLD AUTO: 1.23 THOUSANDS/ΜL (ref 0.6–4.47)
LYMPHOCYTES NFR BLD AUTO: 19 % (ref 14–44)
MCH RBC QN AUTO: 25.3 PG (ref 26.8–34.3)
MCH RBC QN AUTO: 25.7 PG (ref 26.8–34.3)
MCHC RBC AUTO-ENTMCNC: 31.3 G/DL (ref 31.4–37.4)
MCHC RBC AUTO-ENTMCNC: 31.9 G/DL (ref 31.4–37.4)
MCV RBC AUTO: 81 FL (ref 82–98)
MCV RBC AUTO: 81 FL (ref 82–98)
MONOCYTES # BLD AUTO: 0.71 THOUSAND/ΜL (ref 0.17–1.22)
MONOCYTES NFR BLD AUTO: 11 % (ref 4–12)
NEUTROPHILS # BLD AUTO: 4.27 THOUSANDS/ΜL (ref 1.85–7.62)
NEUTS SEG NFR BLD AUTO: 66 % (ref 43–75)
NRBC BLD AUTO-RTO: 0 /100 WBCS
PLATELET # BLD AUTO: 231 THOUSANDS/UL (ref 149–390)
PLATELET # BLD AUTO: 236 THOUSANDS/UL (ref 149–390)
PMV BLD AUTO: 10.1 FL (ref 8.9–12.7)
PMV BLD AUTO: 10.4 FL (ref 8.9–12.7)
POTASSIUM SERPL-SCNC: 4.4 MMOL/L (ref 3.5–5.3)
PROT SERPL-MCNC: 7 G/DL (ref 6.4–8.2)
PROTHROMBIN TIME: 13.6 SECONDS (ref 11.6–14.5)
PROTHROMBIN TIME: 13.7 SECONDS (ref 11.6–14.5)
RBC # BLD AUTO: 4.35 MILLION/UL (ref 3.88–5.62)
RBC # BLD AUTO: 4.38 MILLION/UL (ref 3.88–5.62)
SODIUM SERPL-SCNC: 129 MMOL/L (ref 136–145)
WBC # BLD AUTO: 5.29 THOUSAND/UL (ref 4.31–10.16)
WBC # BLD AUTO: 6.39 THOUSAND/UL (ref 4.31–10.16)

## 2021-07-07 PROCEDURE — 85730 THROMBOPLASTIN TIME PARTIAL: CPT | Performed by: EMERGENCY MEDICINE

## 2021-07-07 PROCEDURE — 99285 EMERGENCY DEPT VISIT HI MDM: CPT | Performed by: EMERGENCY MEDICINE

## 2021-07-07 PROCEDURE — 96365 THER/PROPH/DIAG IV INF INIT: CPT

## 2021-07-07 PROCEDURE — 73590 X-RAY EXAM OF LOWER LEG: CPT

## 2021-07-07 PROCEDURE — 93926 LOWER EXTREMITY STUDY: CPT | Performed by: SURGERY

## 2021-07-07 PROCEDURE — 99223 1ST HOSP IP/OBS HIGH 75: CPT | Performed by: FAMILY MEDICINE

## 2021-07-07 PROCEDURE — 99285 EMERGENCY DEPT VISIT HI MDM: CPT

## 2021-07-07 PROCEDURE — 85027 COMPLETE CBC AUTOMATED: CPT | Performed by: STUDENT IN AN ORGANIZED HEALTH CARE EDUCATION/TRAINING PROGRAM

## 2021-07-07 PROCEDURE — 96375 TX/PRO/DX INJ NEW DRUG ADDON: CPT

## 2021-07-07 PROCEDURE — 36415 COLL VENOUS BLD VENIPUNCTURE: CPT | Performed by: EMERGENCY MEDICINE

## 2021-07-07 PROCEDURE — 93926 LOWER EXTREMITY STUDY: CPT

## 2021-07-07 PROCEDURE — 85730 THROMBOPLASTIN TIME PARTIAL: CPT | Performed by: FAMILY MEDICINE

## 2021-07-07 PROCEDURE — 85610 PROTHROMBIN TIME: CPT | Performed by: STUDENT IN AN ORGANIZED HEALTH CARE EDUCATION/TRAINING PROGRAM

## 2021-07-07 PROCEDURE — 93922 UPR/L XTREMITY ART 2 LEVELS: CPT | Performed by: SURGERY

## 2021-07-07 PROCEDURE — 99223 1ST HOSP IP/OBS HIGH 75: CPT | Performed by: INTERNAL MEDICINE

## 2021-07-07 PROCEDURE — 93005 ELECTROCARDIOGRAM TRACING: CPT

## 2021-07-07 PROCEDURE — 85730 THROMBOPLASTIN TIME PARTIAL: CPT | Performed by: STUDENT IN AN ORGANIZED HEALTH CARE EDUCATION/TRAINING PROGRAM

## 2021-07-07 PROCEDURE — 85610 PROTHROMBIN TIME: CPT | Performed by: EMERGENCY MEDICINE

## 2021-07-07 PROCEDURE — G1004 CDSM NDSC: HCPCS

## 2021-07-07 PROCEDURE — 80053 COMPREHEN METABOLIC PANEL: CPT | Performed by: EMERGENCY MEDICINE

## 2021-07-07 PROCEDURE — 99223 1ST HOSP IP/OBS HIGH 75: CPT | Performed by: PHYSICIAN ASSISTANT

## 2021-07-07 PROCEDURE — 73552 X-RAY EXAM OF FEMUR 2/>: CPT

## 2021-07-07 PROCEDURE — 85025 COMPLETE CBC W/AUTO DIFF WBC: CPT | Performed by: EMERGENCY MEDICINE

## 2021-07-07 PROCEDURE — 75635 CT ANGIO ABDOMINAL ARTERIES: CPT

## 2021-07-07 RX ORDER — ATORVASTATIN CALCIUM 80 MG/1
80 TABLET, FILM COATED ORAL
Status: DISCONTINUED | OUTPATIENT
Start: 2021-07-07 | End: 2021-07-13 | Stop reason: HOSPADM

## 2021-07-07 RX ORDER — OXYCODONE HYDROCHLORIDE 5 MG/1
5 TABLET ORAL EVERY 4 HOURS PRN
Status: DISCONTINUED | OUTPATIENT
Start: 2021-07-07 | End: 2021-07-12

## 2021-07-07 RX ORDER — FUROSEMIDE 40 MG/1
40 TABLET ORAL DAILY
Status: DISCONTINUED | OUTPATIENT
Start: 2021-07-08 | End: 2021-07-07

## 2021-07-07 RX ORDER — FERROUS SULFATE 325(65) MG
325 TABLET ORAL 2 TIMES DAILY WITH MEALS
Status: DISCONTINUED | OUTPATIENT
Start: 2021-07-07 | End: 2021-07-13 | Stop reason: HOSPADM

## 2021-07-07 RX ORDER — HYDROMORPHONE HCL/PF 1 MG/ML
0.5 SYRINGE (ML) INJECTION EVERY 4 HOURS PRN
Status: DISCONTINUED | OUTPATIENT
Start: 2021-07-07 | End: 2021-07-12

## 2021-07-07 RX ORDER — METOPROLOL SUCCINATE 25 MG/1
12.5 TABLET, EXTENDED RELEASE ORAL DAILY
Status: DISCONTINUED | OUTPATIENT
Start: 2021-07-07 | End: 2021-07-08

## 2021-07-07 RX ORDER — PANTOPRAZOLE SODIUM 40 MG/1
40 TABLET, DELAYED RELEASE ORAL
Status: DISCONTINUED | OUTPATIENT
Start: 2021-07-08 | End: 2021-07-13 | Stop reason: HOSPADM

## 2021-07-07 RX ORDER — HEPARIN SODIUM 10000 [USP'U]/100ML
3-30 INJECTION, SOLUTION INTRAVENOUS
Status: DISCONTINUED | OUTPATIENT
Start: 2021-07-07 | End: 2021-07-09

## 2021-07-07 RX ORDER — HEPARIN SODIUM 1000 [USP'U]/ML
3800 INJECTION, SOLUTION INTRAVENOUS; SUBCUTANEOUS
Status: DISCONTINUED | OUTPATIENT
Start: 2021-07-07 | End: 2021-07-09

## 2021-07-07 RX ORDER — HEPARIN SODIUM 1000 [USP'U]/ML
7600 INJECTION, SOLUTION INTRAVENOUS; SUBCUTANEOUS
Status: DISCONTINUED | OUTPATIENT
Start: 2021-07-07 | End: 2021-07-09

## 2021-07-07 RX ORDER — CLOPIDOGREL BISULFATE 75 MG/1
75 TABLET ORAL DAILY
Status: DISCONTINUED | OUTPATIENT
Start: 2021-07-08 | End: 2021-07-13 | Stop reason: HOSPADM

## 2021-07-07 RX ORDER — ACETAMINOPHEN 325 MG/1
650 TABLET ORAL EVERY 6 HOURS PRN
Status: DISCONTINUED | OUTPATIENT
Start: 2021-07-07 | End: 2021-07-11

## 2021-07-07 RX ORDER — HEPARIN SODIUM 1000 [USP'U]/ML
7600 INJECTION, SOLUTION INTRAVENOUS; SUBCUTANEOUS ONCE
Status: COMPLETED | OUTPATIENT
Start: 2021-07-07 | End: 2021-07-07

## 2021-07-07 RX ORDER — TAMSULOSIN HYDROCHLORIDE 0.4 MG/1
0.4 CAPSULE ORAL
Status: DISCONTINUED | OUTPATIENT
Start: 2021-07-07 | End: 2021-07-13 | Stop reason: HOSPADM

## 2021-07-07 RX ADMIN — HEPARIN SODIUM 18 UNITS/KG/HR: 10000 INJECTION, SOLUTION INTRAVENOUS at 13:33

## 2021-07-07 RX ADMIN — METOPROLOL SUCCINATE 12.5 MG: 25 TABLET, FILM COATED, EXTENDED RELEASE ORAL at 18:17

## 2021-07-07 RX ADMIN — FERROUS SULFATE TAB 325 MG (65 MG ELEMENTAL FE) 325 MG: 325 (65 FE) TAB at 18:17

## 2021-07-07 RX ADMIN — IOHEXOL 100 ML: 350 INJECTION, SOLUTION INTRAVENOUS at 15:11

## 2021-07-07 RX ADMIN — ATORVASTATIN CALCIUM 80 MG: 80 TABLET, FILM COATED ORAL at 18:17

## 2021-07-07 RX ADMIN — TAMSULOSIN HYDROCHLORIDE 0.4 MG: 0.4 CAPSULE ORAL at 18:17

## 2021-07-07 RX ADMIN — OXYCODONE HYDROCHLORIDE 5 MG: 5 TABLET ORAL at 18:17

## 2021-07-07 RX ADMIN — HEPARIN SODIUM 7600 UNITS: 1000 INJECTION INTRAVENOUS; SUBCUTANEOUS at 13:34

## 2021-07-07 RX ADMIN — OXYCODONE HYDROCHLORIDE 5 MG: 5 TABLET ORAL at 22:44

## 2021-07-07 NOTE — ED PROVIDER NOTES
History  Chief Complaint   Patient presents with    Leg Pain     Patient reports right leg pain going on for a year but the last few months has gotten so bad that he cannot walk on it; also reports that his heart doctor wants him checked out     27-year-old male with history of metastatic prostate cancer, CHF, anemia, cardiomyopathy status post TAVR presented to the emergency department for evaluation of right lower extremity pain  Per chart review patient was recently admitted found to have a ejection fraction of 20% and had a TAVR done on 2021  The patient reports that he has had right lower extremity pain for the past several months  Reports acute worsening over the past couple of weeks with associated right calf pain and right foot numbness  States that the pain is worse when he is standing or walking  Pain is relieved by dangling his feet off of his bed  He denies recent trauma, fevers, chills, nausea, vomiting, diarrhea, chest pain and shortness of breath  Prior to Admission Medications   Prescriptions Last Dose Informant Patient Reported? Taking?   abiraterone (ZYTIGA) 250 mg tablet   Yes No   Sig: Take by mouth   atorvastatin (LIPITOR) 80 mg tablet   No No   Sig: Take 1 tablet (80 mg total) by mouth daily with dinner   ciprofloxacin (CILOXAN) 0 3 % ophthalmic solution   No No   Sig: Administer 1 drop to both eyes every 2 (two) hours   clopidogrel (PLAVIX) 75 mg tablet   Yes No   Sig: TAKE ONE TABLET BY MOUTH DAILY AS BLOOD THINNER   ferrous sulfate 325 (65 Fe) mg tablet   Yes No   Sig: Take 325 mg by mouth 2 (two) times a day with meals   furosemide (LASIX) 40 mg tablet   No No   Sig: Take 1 tablet (40 mg total) by mouth daily   leuprolide (Eligard) 22 5 mg   Yes No   Si  5MG/1 SYRINGE UNDER THE SKIN ONE TIME AS DIRECTED EVERY 3 MONTHS   loperamide (IMODIUM) 2 mg capsule   Yes No   Sig: Take 4 mg by mouth 4 (four) times a day as needed for diarrhea   metoprolol succinate (TOPROL-XL) 25 mg 24 hr tablet   No No   Sig: Take 0 5 tablets (12 5 mg total) by mouth daily   omeprazole (PriLOSEC) 20 mg delayed release capsule   Yes No   Sig: Take 20 mg by mouth daily   tamsulosin (FLOMAX) 0 4 mg   Yes No   Si 4 mg   traMADol (ULTRAM) 50 mg tablet   Yes No   Sig: Take 50 mg by mouth every 6 (six) hours as needed for moderate pain   Patient not taking: Reported on 2021      Facility-Administered Medications: None       Past Medical History:   Diagnosis Date    Cancer ([facility]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    High cholesterol     Prostate cancer [facility])        Past Surgical History:   Procedure Laterality Date    CARDIAC SURGERY         History reviewed  No pertinent family history  I have reviewed and agree with the history as documented  E-Cigarette/Vaping    E-Cigarette Use Never User      E-Cigarette/Vaping Substances     Social History     Tobacco Use    Smoking status: Former Smoker     Years: 20 00     Quit date: 2002     Years since quittin 0    Smokeless tobacco: Never Used   Vaping Use    Vaping Use: Never used   Substance Use Topics    Alcohol use: Not Currently    Drug use: Not on file        Review of Systems   Constitutional: Negative for chills and fever  HENT: Negative for ear pain and sore throat  Eyes: Negative for pain and visual disturbance  Respiratory: Negative for cough and shortness of breath  Cardiovascular: Negative for chest pain and palpitations  Gastrointestinal: Negative for abdominal pain and vomiting  Genitourinary: Negative for dysuria and hematuria  Musculoskeletal: Positive for gait problem  Negative for arthralgias and back pain  Skin: Negative for color change and rash  Neurological: Negative for seizures and syncope  All other systems reviewed and are negative        Physical Exam  ED Triage Vitals [21 0950]   Temperature Pulse Respirations Blood Pressure SpO2   97 9 °F (36 6 °C) 105 18 103/66 96 %      Temp Source Heart Rate Source Patient Position - Orthostatic VS BP Location FiO2 (%)   Tympanic Monitor Lying Right arm --      Pain Score       8             Orthostatic Vital Signs  Vitals:    07/07/21 0950 07/07/21 1000 07/07/21 1300   BP: 103/66 109/68 106/63   Pulse: 105 90 84   Patient Position - Orthostatic VS: Lying         Physical Exam  Vitals and nursing note reviewed  Constitutional:       Appearance: He is well-developed  HENT:      Head: Normocephalic and atraumatic  Eyes:      Conjunctiva/sclera: Conjunctivae normal    Cardiovascular:      Rate and Rhythm: Normal rate and regular rhythm  Pulses:           Dorsalis pedis pulses are 0 on the right side and detected w/ Doppler on the left side  Posterior tibial pulses are 0 on the right side and detected w/ Doppler on the left side  Heart sounds: No murmur heard  Pulmonary:      Effort: Pulmonary effort is normal  No respiratory distress  Breath sounds: Normal breath sounds  Abdominal:      Palpations: Abdomen is soft  Tenderness: There is no abdominal tenderness  Musculoskeletal:      Cervical back: Neck supple  Right lower leg: Tenderness present  Left lower leg: No tenderness  Right foot: Abnormal pulse  Feet:      Comments: Decreased sensation right foot compared to left  Unable to detect DP and PT pulses with Doppler on the right  Dopplerable pulses on the left  Skin:     General: Skin is warm and dry  Neurological:      Mental Status: He is alert           ED Medications  Medications   heparin (porcine) 25,000 units in 0 45% NaCl 250 mL infusion (premix) (18 Units/kg/hr × 95 kg (Order-Specific) Intravenous New Bag 7/7/21 1333)   heparin (porcine) injection 7,600 Units (has no administration in time range)   heparin (porcine) injection 3,800 Units (has no administration in time range)   heparin (porcine) injection 7,600 Units (7,600 Units Intravenous Given 7/7/21 1334)   iohexol (OMNIPAQUE) 350 MG/ML injection (SINGLE-DOSE) 100 mL (100 mL Intravenous Given 7/7/21 1511)       Diagnostic Studies  Results Reviewed     Procedure Component Value Units Date/Time    APTT [[id number]]     Lab Status: No result Specimen: Blood     APTT [[id number]]  (Normal) Collected: 07/07/21 1305    Lab Status: Final result Specimen: Blood from Arm, Right Updated: 07/07/21 1330     PTT 27 seconds     Protime-INR [[id number]]  (Normal) Collected: 07/07/21 1305    Lab Status: Final result Specimen: Blood from Arm, Right Updated: 07/07/21 1330     Protime 13 7 seconds      INR 1 05    CBC [[id number]]  (Abnormal) Collected: 07/07/21 1305    Lab Status: Final result Specimen: Blood from Arm, Right Updated: 07/07/21 1315     WBC 5 29 Thousand/uL      RBC 4 38 Million/uL      Hemoglobin 11 1 g/dL      Hematocrit 35 5 %      MCV 81 fL      MCH 25 3 pg      MCHC 31 3 g/dL      RDW 15 9 %      Platelets 513 Thousands/uL      MPV 10 1 fL     Comprehensive metabolic panel [[id number]]  (Abnormal) Collected: 07/07/21 1050    Lab Status: Final result Specimen: Blood from Arm, Right Updated: 07/07/21 1134     Sodium 129 mmol/L      Potassium 4 4 mmol/L      Chloride 100 mmol/L      CO2 19 mmol/L      ANION GAP 10 mmol/L      BUN 29 mg/dL      Creatinine 1 28 mg/dL      Glucose 97 mg/dL      Calcium 9 4 mg/dL      AST 21 U/L      ALT 31 U/L      Alkaline Phosphatase 60 U/L      Total Protein 7 0 g/dL      Albumin 3 7 g/dL      Total Bilirubin 1 20 mg/dL      eGFR 54 ml/min/1 73sq m     Narrative:      [place] guidelines for Chronic Kidney Disease (CKD):     Stage 1 with normal or high GFR (GFR > 90 mL/min/1 73 square meters)    Stage 2 Mild CKD (GFR = 60-89 mL/min/1 73 square meters)    Stage 3A Moderate CKD (GFR = 45-59 mL/min/1 73 square meters)    Stage 3B Moderate CKD (GFR = 30-44 mL/min/1 73 square meters)    Stage 4 Severe CKD (GFR = 15-29 mL/min/1 73 square meters)    Stage 5 End Stage CKD (GFR <15 mL/min/1 73 square meters)  Note: GFR calculation is accurate only with a steady state creatinine    Protime-INR [[id number]]  (Normal) Collected: 07/07/21 1050    Lab Status: Final result Specimen: Blood from Arm, Right Updated: 07/07/21 1127     Protime 13 6 seconds      INR 1 04    APTT [[id number]]  (Normal) Collected: 07/07/21 1050    Lab Status: Final result Specimen: Blood from Arm, Right Updated: 07/07/21 1127     PTT 27 seconds     CBC and differential [[id number]]  (Abnormal) Collected: 07/07/21 1050    Lab Status: Final result Specimen: Blood from Arm, Right Updated: 07/07/21 1102     WBC 6 39 Thousand/uL      RBC 4 35 Million/uL      Hemoglobin 11 2 g/dL      Hematocrit 35 1 %      MCV 81 fL      MCH 25 7 pg      MCHC 31 9 g/dL      RDW 15 9 %      MPV 10 4 fL      Platelets 412 Thousands/uL      nRBC 0 /100 WBCs      Neutrophils Relative 66 %      Immat GRANS % 1 %      Lymphocytes Relative 19 %      Monocytes Relative 11 %      Eosinophils Relative 2 %      Basophils Relative 1 %      Neutrophils Absolute 4 27 Thousands/µL      Immature Grans Absolute 0 03 Thousand/uL      Lymphocytes Absolute 1 23 Thousands/µL      Monocytes Absolute 0 71 Thousand/µL      Eosinophils Absolute 0 10 Thousand/µL      Basophils Absolute 0 05 Thousands/µL                  VAS lower limb arterial duplex, limited, unilateral   Final Result by Serina Cook DO (07/07 1243)      XR femur 2 views RIGHT    (Results Pending)   XR tibia fibula 2 views RIGHT    (Results Pending)   CTA ABDOMEN W RUN OFF W WO CONTRAST    (Results Pending)         Procedures  Procedures      ED Course               Identification of Seniors at Risk      Most Recent Value   (ISAR) Identification of Seniors at Risk   Before the illness or injury that brought you to the Emergency, did you need someone to help you on a regular basis?   1 Filed at: 07/07/2021 0951   In the last 24 hours, have you needed more help than usual?  0 Filed at: 07/07/2021 3441   Have you been hospitalized for one or more nights during the past 6 months? 1 Filed at: 07/07/2021 0951   In general, do you see well?  0 Filed at: 07/07/2021 0951   In general, do you have serious problems with your memory? 0 Filed at: 07/07/2021 8235   Do you take more than three different medications every day? 1 Filed at: 07/07/2021 0951   ISAR Score  3 Filed at: 07/07/2021 7738                              MDM  Number of Diagnoses or Management Options  Ischemic leg pain  Diagnosis management comments: 30-year-old male presented to the emergency department for evaluation of right lower extremity pain  On arrival the patient was awake, alert, oriented and in no acute distress  Initial vital signs stable  On exam right DP and PT pulses were unable to be detected with Doppler  Arterial duplex study showing 75% stenosis at CFA/SFA bifurcation  Vascular surgery was consulted who recommended starting the patient on a heparin drip and admitting the patient to the hospital for further treatment and evaluation  Disposition  Final diagnoses:   Ischemic leg pain     Time reflects when diagnosis was documented in both MDM as applicable and the Disposition within this note     Time User Action Codes Description Comment    7/7/2021 12:08 PM Jakob Martinez Add [X78 277,  I99 8] Ischemic leg pain     7/7/2021  2:19 PM Livier Garcia Add [I73 9] PAD (peripheral artery disease) ([facility] )     7/7/2021  2:19 PM Livier Garcia Add [I84 94] Chronic diastolic CHF (congestive heart failure) ([facility] )     7/7/2021  2:19 PM Livier Garcia Add [I25 5] Ischemic cardiomyopathy       ED Disposition     ED Disposition Condition Date/Time Comment    Admit Stable Wed Jul 7, 2021  1:54 PM Case was discussed with DALE and the patient's admission status was agreed to be Admission Status: inpatient status to the service of Dr Zaida Ordoñez           Follow-up Information    None         Patient's Medications   Discharge Prescriptions    No medications on file     No discharge procedures on file  PDMP Review     None           ED Provider  Attending physically available and evaluated Royce Rene I managed the patient along with the ED Attending      Electronically Signed by         Chel Wright MD  07/07/21 9764

## 2021-07-07 NOTE — CONSULTS
Consultation - Vascular Surgery   priscillabetty Rene 76 y o  male MRN: [id number]  Unit/Bed#: ED 05 Encounter: [id number]    Assessment/Plan     Assessment:  71-year-old male with past medical history of CHF, anemia, cardiomyopathy s/p TAVR on 6/22/21  Now presenting with worsening RLE rest pain of one month duration, loss of sensation to the right foot, and right foot weakness  Lower extremity exam:  -RLE: Palp fem, Dopplerable peroneal/AT, non-dopplerable PT/DP  Motor and sensory deficits noted  -LLE: Palp fem, dopplerable DP/PT/Peroneal    Plan:  - LEADS Reviewed: >75% stenosis of the R CFA/SFA with KARLI of 0 and MT/GT 0/0  -F/u CTA w/ runoff  -Patient to remain NPO  -heparin gtt  -Recommend cardiology consult  -Rest of care per primary team    History of Present Illness     HPI:  Tracy Mancilla is a 76 y o  male who presents with worsening right lower extremity pain  Patient endorses a 1 month history of extreme pain to the right lower extremity that is not resolving with dependent position or movement  The patient states that his right lower extremity pain has been present for years, however he states that prior to last month he has always been able to get the pain to resolve with dangling his legs over the bed" and moving them  The patient states that he does not ambulate that off at home, and states that he only goes from his bedroom to his bathroom and back, a distance of approximately 10 ft  Patient denies nausea, vomiting, chest pain, shortness of breath, chills, fever  Consults    Review of Systems   Constitutional: Negative for activity change and chills  HENT: Negative for congestion, nosebleeds and trouble swallowing  Respiratory: Negative for cough, chest tightness and shortness of breath  Cardiovascular: Negative for chest pain  Gastrointestinal: Negative for abdominal distention, abdominal pain, blood in stool, nausea and vomiting  Skin: Positive for color change     Neurological: Positive for weakness and numbness  Negative for dizziness  Psychiatric/Behavioral: Negative  Historical Information   Past Medical History:   Diagnosis Date    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    High cholesterol     Prostate cancer [facility])      Past Surgical History:   Procedure Laterality Date    CARDIAC SURGERY       Social History   Social History     Substance and Sexual Activity   Alcohol Use Not Currently     Social History     Substance and Sexual Activity   Drug Use Not on file     E-Cigarette/Vaping    E-Cigarette Use Never User      E-Cigarette/Vaping Substances     Social History     Tobacco Use   Smoking Status Former Smoker    Years: 20 00    Quit date: 2002    Years since quittin 0   Smokeless Tobacco Never Used     Family History: non-contributory    Meds/Allergies   all current active meds have been reviewed  No Known Allergies    Objective   First Vitals:   Blood Pressure: 103/66 (21 0950)  Pulse: 105 (21 0950)  Temperature: 97 9 °F (36 6 °C) (21 0950)  Temp Source: Tympanic (21 0950)  Respirations: 18 (21 0950)  Height: 5' 7" (170 2 cm) (21 0950)  Weight - Scale: 97 5 kg (215 lb) (21 0950)  SpO2: 96 % (21 0950)    Current Vitals:   Blood Pressure: 106/63 (21 1300)  Pulse: 84 (21 1300)  Temperature: 97 9 °F (36 6 °C) (21 0950)  Temp Source: Tympanic (21 0950)  Respirations: 18 (21 1300)  Height: 5' 7" (170 2 cm) (21 0950)  Weight - Scale: 97 5 kg (215 lb) (21 0950)  SpO2: 95 % (21 1300)    No intake or output data in the 24 hours ending 21 1343    Invasive Devices     Peripheral Intravenous Line            Peripheral IV 21 Right Antecubital <1 day    Peripheral IV 21 Right Forearm <1 day                Physical Exam  Constitutional:       General: He is not in acute distress  Appearance: He is obese  HENT:      Head: Normocephalic and atraumatic        Mouth/Throat: Pharynx: No oropharyngeal exudate  Eyes:      Pupils: Pupils are equal, round, and reactive to light  Cardiovascular:      Rate and Rhythm: Normal rate and regular rhythm  Comments: Right lower extremity:  Palpable femoral pulse, non dopplerable PT/DP pulse, faintly dopplerable peroneal/AT    Left lower extremity:  Palpable femoral pulse, dopplerable PT/DP/peroneal  Pulmonary:      Effort: Pulmonary effort is normal    Abdominal:      General: Abdomen is flat  There is no distension  Palpations: Abdomen is soft  Tenderness: There is no abdominal tenderness  Musculoskeletal:         General: Tenderness present  Comments: Right ankle noted to have 4/5 strength with dorsiflexion or plantar flexion  Patient unable to move his right digits  Skin:     General: Skin is warm  Capillary Refill: Capillary refill takes more than 3 seconds  Findings: Erythema present  Comments: Mild erythema noted to the right lower extremity   Neurological:      Mental Status: He is alert and oriented to person, place, and time  Comments: Loss of sensation noted at the right dorsal foot, right digits  Lab Results: I have personally reviewed pertinent lab results  Imaging: I have personally reviewed pertinent reports

## 2021-07-07 NOTE — ED ATTENDING ATTESTATION
7/7/2021  IErik MD, saw and evaluated the patient  I have discussed the patient with the resident/non-physician practitioner and agree with the resident's/non-physician practitioner's findings, Plan of Care, and MDM as documented in the resident's/non-physician practitioner's note, except where noted  All available labs and Radiology studies were reviewed  I was present for key portions of any procedure(s) performed by the resident/non-physician practitioner and I was immediately available to provide assistance  At this point I agree with the current assessment done in the Emergency Department  I have conducted an independent evaluation of this patient a history and physical is as follows:    ED Course         Critical Care Time  Procedures      77 yo male with hx of metastatic prostate ca, recent tavr with hx of chf and aortic stenosis, here today for right leg pain and weakness  Pt seen by pcp and noted to be hypotensive and told to come in for evaluation  No fever, no chills, no cp, no back pain, nausea, no vomiting  Vss, afebrile, lungs cta, rrr, abdomen soft nontender, right leg warm, not mottled, unable to obtain right dopplerable pulse, decreased sensation  Calf tenderness  Arterial duplex, xray, labs

## 2021-07-07 NOTE — CONSULTS
Advanced Heart Failure/Pulmonary Hypertension Consult Note - Samy Rene 76 y o  male MRN: [id number]    Unit/Bed#: ED 05 Encounter: [id number]      Assessment:    Principal Problem:    Ischemic leg pain  Active Problems:    Chronic combined systolic and diastolic CHF (congestive heart failure) (HCC)    S/P AVR    Anemia    Ischemic cardiomyopathy    Prostate cancer (HCC)      Plan:  Preoperative risk assessment  --Based on the Revised Cardiac Risk Index Spero Fred Criteria), patient's estimated risk of adverse outcome with non-cardiac surgery is considered "high"  His estimated rate of MI, pulmonary edema, VF, Cardiac arrest, or CHB is >11%  --Patient's functional capacity is : likely <3 METS  --Advise judicious administration of IVF and close monitoring of fluid status given patient;'s systolic CHF and risk of volume overload  --Advise cardiac anesthesia  --12 lead EKG from 6/22/21 demonstrates sinus rhythm with LBBB  RLE ischemia  LEAD with >75% stenosis of the CFA/SFA  For CTA with runoff  Management per vascular  On heparin gtt  Newly diagnosed HFrEF; LVEF 20%; LVIDd 5 9 cm; NYHA III; ACC/AHA Stage C  Etiology: ischemic +/- progression of valvular disease +/- chemotherapy (on abiraterone and leuprolide with unclear safety profile)  Examines WWP  Euvolemic  Creat up from baseline with hyponatremia  Suspect r/t overdiuresis with Entresto, Spironolactone, Lasix  All now d/c  Can restart low dose Metoprolol now  Will need to re-evaluate remainder of medical regimen/dosing  prior to discharge  TTE 06/14/2021 (at [facility] pre- and post-TAVR, per report): LVEF 20%  LVIDd 5 4 cm  "entire apex, mid and apical anterior septum, mid and apical inferior septum, mid and apical inferior wall, mid inferolateral wall, and mid lateral wall are akinetic " Trace MR and TR  Pre-TAVR: LVOT VTI 12 0 cm  JOSE 0 47 cm^2  Post: bioprosthetic AV valve present; JOSE 2 41 cm^2  TTE 06/23/2021: LVEF 20%   LVIDd 5 9 cm  Grade 2 DD  "dyskinesis and aneurysmal deformity of the apical anterior, mid anteroseptal, apical inferior, apical septal, and apical wall(s) " Normal RV size and RVSF  Mild MR  AV bioprosthesis with normal leaflet separation  Trace TR  Neurohormonal Blockade:  --Beta Blocker: metoprolol succinate 12 5 mg daily  --ARNi / ACEi / ARB: Entresto 24/26 mg BID- on hold  --Aldosterone Antagonist: No    --SGLT2 Inhibitor: No    --Diuretic: Lasix 40 mg daily- on hold      Sudden Cardiac Death Risk Reduction:  --LVEF 20%  Not wearing LifeVest; left at home  --Plan to check limited TTE in mid September 2021 to reassess LVEF       Electrical Resynchronization:  --Candidacy for BiV device: LBBB with  ms       Advanced Therapies: Will continue to monitor      Prostate cancer with bony metastases              Receiving chemotherapy through the [place]  Taking SQ leuprolide (Eligard) and abiraterone (Zytiga) - safety of abiraterone in patients with LVEF <50% not established per FDA  [place]-analysis of drug noted increased incidence and relative risk of CV toxicity      Coronary artery disease              Without active chest pain  S/pstenting to circumflex and RCA in 2001  [facility] 06/09/2021 (per documentation):"diffuse moderate prox and mid disease/ISR, severe OM lesion and total mid LAD "              Continue on Plavix, statin, and BB as above      Aortic stenosis      S/p TAVR (Evolut Pro+ 29mm) on 06/14/2021 at [facility]  Continues on Plavix       Hyperlipidemia  No recent FLP  Continue on atorvastatin 80 mg daily       History of atrial tachycardia: s/p ablation at OSH in 2018  Benign prostatic hyperplasia    HPI:   Bárbara Winters is a 27-year-old man with a PMH as aboe who presents for hospital follow-up and to establish care       Presented to unspecified hospital on 06/03/2021 with chest pressure before chemotherapy treatment for prostate cancer   Sent for testing and noted to be in acute CHF exacerbation and was diuresed  Was then transferred to [facility] in [place]  TTE showed "severely reduced LVEF with LV apical aneurysm and septal/AW AK, RV normal, Vmax at 3 5 (however, it was read as moderate AS and preserved EF in 2019 so likely this is progression of AS in addition to interim AWMI) "  C also done which revealed "diffuse moderate prox and mid disease/ISR, severe OM lesion and total mid LAD "     Transferred to [facility] on 06/13 for TAVR  TAVR completed on 06/14, and patient was discharged home on 06/15/2021  Started on metoprolol succinate this admission (tartrate discontinued)       Admitted to [facility] from 06/22 to 06/25/2021 after presenting with worsening SOB and LE edema for 3 days  WAS without discharge medications from [facility] admission for about 1 week (including Lasix)  NT-proBNP 5157  Cardiology was consulted, TTE was completed, and IV diuresis was started  Reduced LVEF was confirmed and aneurysmal wall motion of apical segments  Transitioned to PO diuretics on day of discharge  Started on [place] and fit with LifeVest  Lost 10 lbs this admission       07/06/2021: Kasandra Andrew PA-C, [facility] follow up  Patient presents for hospital follow-up  Primary complaint today is of dizziness and right LE pain/numbeness and right foot wound  Reports decreased appetite and increased frustration with attempting to adhere to low sodium diet  Did receive "Living With Heart Failure" booklet during admission  Prior to admissions, often would eat canned pastas for his meals  Considering getting Meals of Wheels  Unclear if patient has been taking both tartrate and succinate since discharge  Not wearing LifeVest; dislikes wearing it and left device at home today  Has not been completing daily weights; does not have scale at home  Patient organizes his own meds; VNA in home 2 times weekly  Patient seen in the outpatient HF clinic yesterday as described above   Advised to go to the ED for further evaluation of his symptomatic hypotension and right leg pain/numbness  There was suspicion that he may have been taking his medications incorrectly  He was instructed to stop all medications with the exception of Lasix  His Metoprolol was reduced to 12 5 mg once daily  He refused to come the ED yesterday as advised however due to ongoing symptoms decided to present today  He has already been seen by vascular who performed LEADS  This showed 75% stenosis of the CFA and SFA  He is scheduled for CTA with runoff and is being kept NPO at this time  We are being asked to see patient for pre-op risk assessment  He remains hemodynamically stable at present   His labs show hyponatremia with a creatinine that is elevated from baseline        Past Medical History:   Diagnosis Date    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    High cholesterol     [facility]        12 point ROS negative other than that stated in HPI    No Known Allergies      Current Facility-Administered Medications:     heparin (porcine) 25,000 units in 0 45% NaCl 250 mL infusion (premix), 3-30 Units/kg/hr (Order-Specific), Intravenous, Titrated, Scottie Spangler DPM, Last Rate: 17 1 mL/hr at 07/07/21 1333, 18 Units/kg/hr at 07/07/21 1333    heparin (porcine) injection 3,800 Units, 3,800 Units, Intravenous, Q1H PRN, Robert Ashley DPM    heparin (porcine) injection 7,600 Units, 7,600 Units, Intravenous, Q1H PRN, Robert Ashley DPM    Current Outpatient Medications:     abiraterone (ZYTIGA) 250 mg tablet, Take by mouth, Disp: , Rfl:     atorvastatin (LIPITOR) 80 mg tablet, Take 1 tablet (80 mg total) by mouth daily with dinner, Disp: 30 tablet, Rfl: 0    ciprofloxacin (CILOXAN) 0 3 % ophthalmic solution, Administer 1 drop to both eyes every 2 (two) hours, Disp: 5 mL, Rfl: 0    clopidogrel (PLAVIX) 75 mg tablet, TAKE ONE TABLET BY MOUTH DAILY AS BLOOD THINNER, Disp: , Rfl:     ferrous sulfate 325 (65 Fe) mg tablet, Take 325 mg by mouth 2 (two) times a day with meals, Disp: , Rfl:     furosemide (LASIX) 40 mg tablet, Take 1 tablet (40 mg total) by mouth daily, Disp: 30 tablet, Rfl: 0    leuprolide (Eligard) 22 5 mg, 22 5MG/1 SYRINGE UNDER THE SKIN ONE TIME AS DIRECTED EVERY 3 MONTHS, Disp: , Rfl:     loperamide (IMODIUM) 2 mg capsule, Take 4 mg by mouth 4 (four) times a day as needed for diarrhea, Disp: , Rfl:     metoprolol succinate (TOPROL-XL) 25 mg 24 hr tablet, Take 0 5 tablets (12 5 mg total) by mouth daily, Disp: 30 tablet, Rfl: 0    omeprazole (PriLOSEC) 20 mg delayed release capsule, Take 20 mg by mouth daily, Disp: , Rfl:     tamsulosin (FLOMAX) 0 4 mg, 0 4 mg, Disp: , Rfl:     traMADol (ULTRAM) 50 mg tablet, Take 50 mg by mouth every 6 (six) hours as needed for moderate pain (Patient not taking: Reported on 2021), Disp: , Rfl:     Social History     Socioeconomic History    Marital status:      Spouse name: Not on file    Number of children: 3    Years of education: Not on file    Highest education level: Not on file   Occupational History    Not on file   Tobacco Use    Smoking status: Former Smoker     Years: 20 00     Quit date: 2002     Years since quittin 0    Smokeless tobacco: Never Used   Vaping Use    Vaping Use: Never used   Substance and Sexual Activity    Alcohol use: Not Currently    Drug use: Not on file    Sexual activity: Not on file   Other Topics Concern    Not on file   Social History Narrative    Not on file     Social Determinants of Health     Financial Resource Strain:     Difficulty of Paying Living Expenses:    Food Insecurity:     Worried About [address] [employer] in the Last Year:     [address] [employer] in the Last Year:    Transportation Needs:     Lack of Transportation (Medical):      Lack of Transportation (Non-Medical):    Physical Activity:     Days of Exercise per Week:     Minutes of Exercise per Session:    Stress:     Feeling of Stress :    Social Connections:  Frequency of Communication with Friends and Family:     Frequency of Social Gatherings with Friends and Family:     Attends Orthodoxy Services:     Active Member of Clubs or Organizations:     Attends Club or Organization Meetings:     Marital Status:    Intimate Partner Violence:     Fear of Current or Ex-Partner:     Emotionally Abused:     Physically Abused:     Sexually Abused:        History reviewed  No pertinent family history  Physical Exam:  Central Line (day, reason): Moser catheter (day, reason):    Vitals: Blood pressure 106/63, pulse 84, temperature 97 9 °F (36 6 °C), temperature source Tympanic, resp  rate 18, height 5' 7" (1 702 m), weight 97 5 kg (215 lb), SpO2 95 %  , Body mass index is 33 67 kg/m² , No intake/output data recorded  No intake/output data recorded  Wt Readings from Last 3 Encounters:   07/07/21 97 5 kg (215 lb)   07/06/21 97 3 kg (214 lb 8 oz)   07/01/21 98 8 kg (217 lb 12 8 oz)     No intake or output data in the 24 hours ending 07/07/21 1513  No intake/output data recorded  No significant arrhythmias seen on telemetry review      Vitals:    07/07/21 0950 07/07/21 1000 07/07/21 1300   BP: 103/66 109/68 106/63   BP Location: Right arm     Pulse: 105 90 84   Resp: 18  18   Temp: 97 9 °F (36 6 °C)     TempSrc: Tympanic     SpO2: 96% 95% 95%   Weight: 97 5 kg (215 lb)     Height: 5' 7" (1 702 m)         GEN: Royce Rene appears well, alert and oriented x 3, pleasant and cooperative   HEENT: pupils equal, round, and reactive to light; extraocular muscles intact  NECK: supple, no carotid bruits   HEART: regular rhythm, normal S1 and S2, no murmurs, clicks, gallops or rubs, JVP is  flat  LUNGS: clear to auscultation bilaterally; no wheezes, rales, or rhonchi   ABDOMEN: normal bowel sounds, soft, no tenderness, no distention  EXTREMITIES: RLE slightly cool with areas of erythema, no clubbing, cyanosis, or edema  NEURO: no focal findings   SKIN: normal without suspicious lesions on exposed skin    Labs & Results:        Results from last 7 days   Lab Units 07/07/21  1305 07/07/21  1050   WBC Thousand/uL 5 29 6 39   HEMOGLOBIN g/dL 11 1* 11 2*   HEMATOCRIT % 35 5* 35 1*   PLATELETS Thousands/uL 236 231         Results from last 7 days   Lab Units 07/07/21  1050   POTASSIUM mmol/L 4 4   CHLORIDE mmol/L 100   CO2 mmol/L 19*   BUN mg/dL 29*   CREATININE mg/dL 1 28   CALCIUM mg/dL 9 4   ALK PHOS U/L 60   ALT U/L 31   AST U/L 21     Results from last 7 days   Lab Units 07/07/21  1305 07/07/21  1050   INR  1 05 1 04       Chest X-Ray is obtained; result - reviewed   EKG personally reviewed by OMAIRA Guerrero  Counseling / Coordination of Care  Total floor / unit time spent today 40 minutes  Greater than 50% of total time was spent with the patient and / or family counseling and / or coordination of care  A description of the counseling / coordination of care: 20  Thank you for the opportunity to participate in the care of this patient  Katelynn Salcedo

## 2021-07-07 NOTE — ASSESSMENT & PLAN NOTE
· Patient with history of ischemic cardiomyopathy, chronic CHF, presented with right lower leg pain    · LEAD reveals >75% stenosis of the R CFA/SFA  · Vascular surgery consult appreciated  · Patient started on heparin GTT  · Pending CTA with runoff  · NPO  · Cardiology consult for preop clearance

## 2021-07-07 NOTE — H&P
[facility]  H&P- Martir Ga Free 1945, 76 y o  male MRN: [id number]  Unit/Bed#: ED 05 Encounter: [id number]  Primary Care Provider: Christie Wahl MD   Date and time admitted to hospital: 7/7/2021  9:48 AM    * Ischemic leg pain  Assessment & Plan  · Patient with history of ischemic cardiomyopathy, chronic CHF, presented with right lower leg pain  · LEAD reveals >75% stenosis of the R CFA/SFA  · Vascular surgery consult appreciated  · Patient started on heparin GTT  · Pending CTA with runoff  · NPO  · Cardiology consult for preop clearance    Prostate cancer [facility])  Assessment & Plan  · S/P  Chemotherapy and radiation in [place]  · Patient was receiving chemo every 4 weeks in [place], last chemo 4 weeks ago  · Patient wishes to establish care with Oncology at [facility]    Ischemic cardiomyopathy  [address] [place] [address]  · Resume metoprolol, recently dose was reduced due to low blood pressure  · Discussed with vascular surgery, okay to continue Plavix  · Consult Cardio for preop clearance  · most recent echo on 06/23/2021 showed LVEF of 20%  · Patient was prescribed a LifeVest last admission, currently not using, states he sent it back because it was loose fitting    · Reviewed [place] records, Cardiac catheterization 6/9/21: diffuse moderate prox and mid disease/ISR, severe OM lesion, total mid LAD (likely infarcted territory given AK and aneurysmal)    Anemia  Assessment & Plan  Hemoglobin stable at 11  Iron supplement    S/P AVR  Assessment & Plan  Status post TAVR in [place] on 6/14/21    Chronic combined systolic and diastolic CHF (congestive heart failure) (HCC)  Assessment & Plan  Wt Readings from Last 3 Encounters:   07/07/21 97 5 kg (215 lb)   07/06/21 97 3 kg (214 lb 8 oz)   07/01/21 98 8 kg (217 lb 12 8 oz)     · TTE 06/23/2021: LVEF 20%  · Patient is currently euvolemic  · Daily weight, intake and output  · Continue metoprolol, resume Lasix home dose 40 mg daily      VTE Prophylaxis: Heparin Drip  / sequential compression device   Code Status:  Full code     Discussion with family:      Anticipated Length of Stay:  Patient will be admitted on an Inpatient basis with an anticipated length of stay of  > 2 midnights  Justification for Hospital Stay:  Ischemic leg pain, vascular surgery    Total Time for Visit, including Counseling / Coordination of Care: 60 minutes  Greater than 50% of this total time spent on direct patient counseling and coordination of care  Chief Complaint:   Right leg pain    History of Present Illness:    Bob Courtney is a 76 y o  male with history of chronic diastolic CHF,s/p TAVR 5/42/81, ischemic cardiomyopathy, presented to ER with complaint of right leg pain and generalized weakness  Patient was seen by cardiologist yesterday and was recommended to come to ER for evaluation for hypotension however patient presented today  In the ER, vital signs stable  Afebrile  Lower extremity arterial duplex was obtained that showed more than 75% stenosis of the CFA/SFA  Vascular surgery consulted who recommended patient to be admitted and cardiology clearance  Patient currently denies any chest pain, shortness of breath, diaphoresis, leg edema  Review of Systems:    Review of Systems   Constitutional: Positive for fatigue  Negative for activity change and appetite change  HENT: Negative for congestion and sore throat  Eyes: Negative for pain and visual disturbance  Respiratory: Negative for cough and shortness of breath  Cardiovascular: Negative for chest pain and leg swelling  Gastrointestinal: Negative for abdominal distention and abdominal pain  Endocrine: Negative for polyuria  Genitourinary: Negative for difficulty urinating  Musculoskeletal: Positive for gait problem and myalgias  Negative for arthralgias and joint swelling  Skin: Negative for wound  Allergic/Immunologic: Negative for food allergies     Neurological: Negative for light-headedness and headaches  Hematological: Negative for adenopathy  Psychiatric/Behavioral: Negative for sleep disturbance  Past Medical and Surgical History:     Past Medical History:   Diagnosis Date    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 Authorizing Provider   abiraterone (ZYTIGA) 250 mg tablet Take by mouth    Historical Provider, MD   atorvastatin (LIPITOR) 80 mg tablet Take 1 tablet (80 mg total) by mouth daily with dinner 6/25/21   Brigida Thayer MD   ciprofloxacin (CILOXAN) 0 3 % ophthalmic solution Administer 1 drop to both eyes every 2 (two) hours 7/2/21   Dex Hanna MD   clopidogrel (PLAVIX) 75 mg tablet TAKE ONE TABLET BY MOUTH DAILY AS BLOOD THINNER 6/15/21   Historical Provider, MD   ferrous sulfate 325 (65 Fe) mg tablet Take 325 mg by mouth 2 (two) times a day with meals    Historical Provider, MD   furosemide (LASIX) 40 mg tablet Take 1 tablet (40 mg total) by mouth daily 6/25/21   Brigida Thayer MD   leuprolide (Eligard) 22 5 mg 22  5MG/1 SYRINGE UNDER THE SKIN ONE TIME AS DIRECTED EVERY 3 MONTHS 3/31/21   Historical Provider, MD   loperamide (IMODIUM) 2 mg capsule Take 4 mg by mouth 4 (four) times a day as needed for diarrhea    Historical Provider, MD   metoprolol succinate (TOPROL-XL) 25 mg 24 hr tablet Take 0 5 tablets (12 5 mg total) by mouth daily 7/6/21   Diana Valverde PA-C   omeprazole (PriLOSEC) 20 mg delayed release capsule Take 20 mg by mouth daily    Historical Provider, MD   predniSONE 5 mg tablet Take 5 mg by mouth daily    Historical Provider, MD   tamsulosin (FLOMAX) 0 4 mg 0 4 mg 4/27/21   Historical Provider, MD   traMADol (ULTRAM) 50 mg tablet Take 50 mg by mouth every 6 (six) hours as needed for moderate pain  Patient not taking: Reported on 2021    Historical Provider, MD     I have reviewed home medications using allscripts  Allergies: No Known Allergies    Social History:     Marital Status:    Occupation: retired  Patient Pre-hospital Living Situation: lives alone      Substance Use History:   Social History     Substance and Sexual Activity   Alcohol Use Not Currently     Social History     Tobacco Use   Smoking Status Former Smoker    Years: 20 00    Quit date: 2002    Years since quittin 0   Smokeless Tobacco Never Used     Social History     Substance and Sexual Activity   Drug Use Not on file       Family History:    History reviewed  No pertinent family history  Physical Exam:     Vitals:   Blood Pressure: 106/63 (21 1300)  Pulse: 84 (21 1300)  Temperature: 97 9 °F (36 6 °C) (21)  Temp Source: Tympanic (21)  Respirations: 18 (21 1300)  Height: 5' 7" (170 2 cm) (21)  Weight - Scale: 97 5 kg (215 lb) (21)  SpO2: 95 % (21 1300)    Physical Exam  Vitals and nursing note reviewed  Constitutional:       General: He is not in acute distress  Appearance: He is well-developed  HENT:      Head: Normocephalic and atraumatic  Eyes:      Conjunctiva/sclera: Conjunctivae normal    Cardiovascular:      Rate and Rhythm: Normal rate  Rhythm irregular  Heart sounds: No murmur heard  Pulmonary:      Effort: Pulmonary effort is normal  No respiratory distress  Breath sounds: Normal breath sounds  Abdominal:      Palpations: Abdomen is soft  Tenderness: There is no abdominal tenderness  Musculoskeletal:      Cervical back: Neck supple  Right lower leg: No edema  Left lower leg: No edema  Skin:     General: Skin is warm and dry  Neurological:      Mental Status: He is alert  Additional Data:     Lab Results: I have personally reviewed pertinent reports        Results from last 7 days   Lab Units 07/07/21  1305 07/07/21  1050   WBC Thousand/uL 5 29 6 39   HEMOGLOBIN g/dL 11 1* 11 2*   HEMATOCRIT % 35 5* 35 1*   PLATELETS Thousands/uL 236 231   NEUTROS PCT %  --  66   LYMPHS PCT %  --  19   MONOS PCT %  --  11   EOS PCT %  --  2     Results from last 7 days   Lab Units 07/07/21  1050   SODIUM mmol/L 129*   POTASSIUM mmol/L 4 4   CHLORIDE mmol/L 100   CO2 mmol/L 19*   BUN mg/dL 29*   CREATININE mg/dL 1 28   ANION GAP mmol/L 10   CALCIUM mg/dL 9 4   ALBUMIN g/dL 3 7   TOTAL BILIRUBIN mg/dL 1 20*   ALK PHOS U/L 60   ALT U/L 31   AST U/L 21   GLUCOSE RANDOM mg/dL 97     Results from last 7 days   Lab Units 07/07/21  1305   INR  1 05                   Imaging: I have personally reviewed pertinent reports  VAS lower limb arterial duplex, limited, unilateral   Final Result by Serina Cook DO (07/07 1243)      XR femur 2 views RIGHT    (Results Pending)   XR tibia fibula 2 views RIGHT    (Results Pending)   CTA ABDOMEN W RUN OFF W WO CONTRAST    (Results Pending)       EKG, Pathology, and Other Studies Reviewed on Admission:   · EKG: pending    Allscripts / Epic Records Reviewed: Yes     ** Please Note: This note has been constructed using a voice recognition system   **

## 2021-07-07 NOTE — ASSESSMENT & PLAN NOTE
Wt Readings from Last 3 Encounters:   07/07/21 97 5 kg (215 lb)   07/06/21 97 3 kg (214 lb 8 oz)   07/01/21 98 8 kg (217 lb 12 8 oz)     · TTE 06/23/2021: LVEF 20%  · Patient is currently euvolemic  · Daily weight, intake and output  · Continue metoprolol, resume Lasix home dose 40 mg daily

## 2021-07-07 NOTE — ASSESSMENT & PLAN NOTE
· Resume metoprolol, recently dose was reduced due to low blood pressure  · Discussed with vascular surgery, okay to continue Plavix  · Consult Cardio for preop clearance  · most recent echo on 06/23/2021 showed LVEF of 20%  · Patient was prescribed a LifeVest last admission, currently not using, states he sent it back because it was loose fitting    · Reviewed [place] records, Cardiac catheterization 6/9/21: diffuse moderate prox and mid disease/ISR, severe OM lesion, total mid LAD (likely infarcted territory given AK and aneurysmal)

## 2021-07-08 ENCOUNTER — PATIENT OUTREACH (OUTPATIENT)
Dept: CASE MANAGEMENT | Facility: OTHER | Age: 76
End: 2021-07-08

## 2021-07-08 LAB
ANION GAP SERPL CALCULATED.3IONS-SCNC: 6 MMOL/L (ref 4–13)
APTT PPP: 117 SECONDS (ref 23–37)
APTT PPP: 134 SECONDS (ref 23–37)
APTT PPP: 59 SECONDS (ref 23–37)
BUN SERPL-MCNC: 20 MG/DL (ref 5–25)
CALCIUM SERPL-MCNC: 9.6 MG/DL (ref 8.3–10.1)
CHLORIDE SERPL-SCNC: 105 MMOL/L (ref 100–108)
CO2 SERPL-SCNC: 22 MMOL/L (ref 21–32)
CREAT SERPL-MCNC: 0.98 MG/DL (ref 0.6–1.3)
ERYTHROCYTE [DISTWIDTH] IN BLOOD BY AUTOMATED COUNT: 16.1 % (ref 11.6–15.1)
GFR SERPL CREATININE-BSD FRML MDRD: 75 ML/MIN/1.73SQ M
GLUCOSE SERPL-MCNC: 90 MG/DL (ref 65–140)
HCT VFR BLD AUTO: 34.4 % (ref 36.5–49.3)
HGB BLD-MCNC: 10.6 G/DL (ref 12–17)
INR PPP: 1.11 (ref 0.84–1.19)
MCH RBC QN AUTO: 25.3 PG (ref 26.8–34.3)
MCHC RBC AUTO-ENTMCNC: 30.8 G/DL (ref 31.4–37.4)
MCV RBC AUTO: 82 FL (ref 82–98)
PLATELET # BLD AUTO: 211 THOUSANDS/UL (ref 149–390)
PMV BLD AUTO: 10.5 FL (ref 8.9–12.7)
POTASSIUM SERPL-SCNC: 4.1 MMOL/L (ref 3.5–5.3)
PROTHROMBIN TIME: 14.3 SECONDS (ref 11.6–14.5)
RBC # BLD AUTO: 4.19 MILLION/UL (ref 3.88–5.62)
SODIUM SERPL-SCNC: 133 MMOL/L (ref 136–145)
WBC # BLD AUTO: 5.05 THOUSAND/UL (ref 4.31–10.16)

## 2021-07-08 PROCEDURE — 99232 SBSQ HOSP IP/OBS MODERATE 35: CPT | Performed by: INTERNAL MEDICINE

## 2021-07-08 PROCEDURE — 80048 BASIC METABOLIC PNL TOTAL CA: CPT | Performed by: FAMILY MEDICINE

## 2021-07-08 PROCEDURE — 85027 COMPLETE CBC AUTOMATED: CPT | Performed by: FAMILY MEDICINE

## 2021-07-08 PROCEDURE — 85730 THROMBOPLASTIN TIME PARTIAL: CPT | Performed by: HOSPITALIST

## 2021-07-08 PROCEDURE — 99232 SBSQ HOSP IP/OBS MODERATE 35: CPT | Performed by: SURGERY

## 2021-07-08 PROCEDURE — 85610 PROTHROMBIN TIME: CPT | Performed by: FAMILY MEDICINE

## 2021-07-08 PROCEDURE — 99232 SBSQ HOSP IP/OBS MODERATE 35: CPT | Performed by: HOSPITALIST

## 2021-07-08 PROCEDURE — 85730 THROMBOPLASTIN TIME PARTIAL: CPT | Performed by: FAMILY MEDICINE

## 2021-07-08 PROCEDURE — NC001 PR NO CHARGE: Performed by: INTERNAL MEDICINE

## 2021-07-08 PROCEDURE — NC001 PR NO CHARGE: Performed by: PHYSICIAN ASSISTANT

## 2021-07-08 RX ORDER — CEFAZOLIN SODIUM 2 G/50ML
2000 SOLUTION INTRAVENOUS ONCE
Status: CANCELLED | OUTPATIENT
Start: 2021-07-08 | End: 2021-07-08

## 2021-07-08 RX ORDER — CHLORHEXIDINE GLUCONATE 0.12 MG/ML
15 RINSE ORAL ONCE
Status: COMPLETED | OUTPATIENT
Start: 2021-07-09 | End: 2021-07-09

## 2021-07-08 RX ORDER — METOPROLOL SUCCINATE 25 MG/1
12.5 TABLET, EXTENDED RELEASE ORAL DAILY
Status: DISCONTINUED | OUTPATIENT
Start: 2021-07-09 | End: 2021-07-13 | Stop reason: HOSPADM

## 2021-07-08 RX ORDER — SODIUM CHLORIDE 9 MG/ML
50 INJECTION, SOLUTION INTRAVENOUS CONTINUOUS
Status: DISCONTINUED | OUTPATIENT
Start: 2021-07-09 | End: 2021-07-09

## 2021-07-08 RX ADMIN — PANTOPRAZOLE SODIUM 40 MG: 40 TABLET, DELAYED RELEASE ORAL at 05:24

## 2021-07-08 RX ADMIN — FERROUS SULFATE TAB 325 MG (65 MG ELEMENTAL FE) 325 MG: 325 (65 FE) TAB at 16:21

## 2021-07-08 RX ADMIN — ATORVASTATIN CALCIUM 80 MG: 80 TABLET, FILM COATED ORAL at 16:21

## 2021-07-08 RX ADMIN — TAMSULOSIN HYDROCHLORIDE 0.4 MG: 0.4 CAPSULE ORAL at 16:21

## 2021-07-08 RX ADMIN — HEPARIN SODIUM 12 UNITS/KG/HR: 10000 INJECTION, SOLUTION INTRAVENOUS at 09:14

## 2021-07-08 RX ADMIN — METOPROLOL SUCCINATE 12.5 MG: 25 TABLET, FILM COATED, EXTENDED RELEASE ORAL at 09:27

## 2021-07-08 RX ADMIN — CLOPIDOGREL BISULFATE 75 MG: 75 TABLET ORAL at 09:27

## 2021-07-08 RX ADMIN — HEPARIN SODIUM 3800 UNITS: 1000 INJECTION INTRAVENOUS; SUBCUTANEOUS at 14:00

## 2021-07-08 NOTE — ASSESSMENT & PLAN NOTE
· Patient with history of ischemic cardiomyopathy, chronic CHF, presented with right lower leg pain  · LEAD reveals >75% stenosis of the R CFA/SFA  · Vascular surgery consult appreciated - line for endarterectomy on 07/09  · Continue heparin drip  · CTA runoff: High-grade distal right common femoral artery, proximal profunda femoris artery, and multifocal superficial femoral artery stenoses with probable three-vessel runoff to the foot   Distal runoff is limited due to dense calcifications  · N p o  after midnight

## 2021-07-08 NOTE — PROGRESS NOTES
OP CM SW received referral regarding patient  Chart review completed   Patient is currently admitted at [facility]  SW Cm will attempt outreach upon discharge  Next outreach set

## 2021-07-08 NOTE — ASSESSMENT & PLAN NOTE
· Continue Toprol XL 12 5 mg daily, recently dose was reduced due to low blood pressure  · Discussed with vascular surgery, okay to continue Plavix  · Continue statin  · Cardio consulted for preop clearance - patient is high risk but acceptable with recommendations to continue beta-blocker and statin perioperatively and he struck Plavix as soon as possible postoperatively  · most recent echo on 06/23/2021 showed LVEF of 20%  · Patient was prescribed a LifeVest last admission, currently not using, states he sent it back because it was loose fitting    · Reviewed [place] records, Cardiac catheterization 6/9/21: diffuse moderate prox and mid disease/ISR, severe OM lesion, total mid LAD (likely infarcted territory given AK and aneurysmal)

## 2021-07-08 NOTE — PLAN OF CARE
Problem: MOBILITY - ADULT  Goal: Maintain or return to baseline ADL function  Description: INTERVENTIONS:  -  Assess patient's ability to carry out ADLs; assess patient's baseline for ADL function and identify physical deficits which impact ability to perform ADLs (bathing, care of mouth/teeth, toileting, grooming, dressing, etc )  - Assess/evaluate cause of self-care deficits   - Assess range of motion  - Assess patient's mobility; develop plan if impaired  - Assess patient's need for assistive devices and provide as appropriate  - Encourage maximum independence but intervene and supervise when necessary  - Involve family in performance of ADLs  - Assess for home care needs following discharge   - Consider OT consult to assist with ADL evaluation and planning for discharge  - Provide patient education as appropriate  Outcome: Progressing  Goal: Maintains/Returns to pre admission functional level  Description: INTERVENTIONS:  - Perform BMAT or MOVE assessment daily    - Set and communicate daily mobility goal to care team and patient/family/caregiver     - Collaborate with rehabilitation services on mobility goals if consulted  - Out of bed for toileting  - Record patient progress and toleration of activity level   Outcome: Progressing     Problem: PAIN - ADULT  Goal: Verbalizes/displays adequate comfort level or baseline comfort level  Description: Interventions:  - Encourage patient to monitor pain and request assistance  - Assess pain using appropriate pain scale  - Administer analgesics based on type and severity of pain and evaluate response  - Implement non-pharmacological measures as appropriate and evaluate response  - Consider cultural and social influences on pain and pain management  - Notify physician/advanced practitioner if interventions unsuccessful or patient reports new pain  Outcome: Progressing     Problem: INFECTION - ADULT  Goal: Absence or prevention of progression during hospitalization  Description: INTERVENTIONS:  - Assess and monitor for signs and symptoms of infection  - Monitor lab/diagnostic results  - Monitor all insertion sites, i e  indwelling lines, tubes, and drains  - Monitor endotracheal if appropriate and nasal secretions for changes in amount and color  - Jackson appropriate cooling/warming therapies per order  - Administer medications as ordered  - Instruct and encourage patient and family to use good hand hygiene technique  - Identify and instruct in appropriate isolation precautions for identified infection/condition  Outcome: Progressing     Problem: SAFETY ADULT  Goal: Patient will remain free of falls  Description: INTERVENTIONS:  - Educate patient/family on patient safety including physical limitations  - Instruct patient to call for assistance with activity   - Consult OT/PT to assist with strengthening/mobility   - Keep Call bell within reach  - Keep bed low and locked with side rails adjusted as appropriate  - Keep care items and personal belongings within reach  - Initiate and maintain comfort rounds  - Make Fall Risk Sign visible to staff  - Apply yellow socks and bracelet for high fall risk patients  - Consider moving patient to room near nurses station  Outcome: Progressing     Problem: DISCHARGE PLANNING  Goal: Discharge to home or other facility with appropriate resources  Description: INTERVENTIONS:  - Identify barriers to discharge w/patient and caregiver  - Arrange for needed discharge resources and transportation as appropriate  - Identify discharge learning needs (meds, wound care, etc )  - Arrange for interpretive services to assist at discharge as needed  - Refer to Case Management Department for coordinating discharge planning if the patient needs post-hospital services based on physician/advanced practitioner order or complex needs related to functional status, cognitive ability, or social support system  Outcome: Progressing

## 2021-07-08 NOTE — UTILIZATION REVIEW
Initial Clinical Review    Admission: Date/Time/Statement:   Admission Orders (From admission, onward)     Ordered        07/07/21 1355  Inpatient Admission  Once                   Orders Placed This Encounter   Procedures    Inpatient Admission     Standing Status:   Standing     Number of Occurrences:   1     Order Specific Question:   Level of Care     Answer:   Med Surg [16]     Order Specific Question:   Estimated length of stay     Answer:   More than 2 Midnights     Order Specific Question:   Certification     Answer:   I certify that inpatient services are medically necessary for this patient for a duration of greater than two midnights  See H&P and MD Progress Notes for additional information about the patient's course of treatment  ED Arrival Information     Expected Arrival Acuity    - 7/7/2021 09:31 Urgent         Means of arrival Escorted by Service Admission type    Wheelchair Self Hospitalist Urgent         Arrival complaint    Right leg weakness        Chief Complaint   Patient presents with    Leg Pain     Patient reports right leg pain going on for a year but the last few months has gotten so bad that he cannot walk on it; also reports that his heart doctor wants him checked out     Initial Presentation:   Mr Malika Jennings is a 77 yo male who presents to the ED from home at the recommendation of his cardiologist with c/o RLE pain, generalized weakness, hypotension  PMH: chronic diastolic CHF,s/p TAVR 9/61/77, ischemic cardiomyopathy, prostate CA on chemo - last one 4 wks ag, s/p TAVR 6/2021  In the ED, vitals were stable, afebrile  Imaging showed  >75% stenosis of CFA/SFA  On exam he is fatigued, has gait problems and myalgias  After discussion with Vascular surgery pt is admitted to INPATIENT status with Ischemic leg pain - cardio clearance for intervention, CTA with run off, Heparin drip, NPO    Ischemic CM - resume Metoprolol, continue Plavix, pt has not been wearing his life vest citing it is too loose  Combined chronic s/dCHF - continue home Metoprolol and Lasix  7/7 Cardio Consult - preop clearance - high risk but well compensated, hold diuretics and Entresto today and start low dose Metoprolol  No additional cardiac testing needed  Date: 7/8   Day 2:   RLE pain has improved with Heparin drip and dependent position  Improvement in movement of R foot  Toes and ankle move freely  CT angio showed Heavily calcified plaque in the distal common femoral artery and proximal profunda and superficial femoral arteries  The could have been some plaque shift during closure device use for recent aortic valve replacement  PLAN - R femoral endarterectomy with possible distal SFA intervention in OR 7/9  ED Triage Vitals [07/07/21 0950]   Temperature Pulse Respirations Blood Pressure SpO2   97 9 °F (36 6 °C) 105 18 103/66 96 %      Temp Source Heart Rate Source Patient Position - Orthostatic VS BP Location FiO2 (%)   Tympanic Monitor Lying Right arm --      Pain Score       8          Wt Readings from Last 1 Encounters:   07/07/21 97 5 kg (215 lb)     Additional Vital Signs:   07/08/21 08:04:31  97 9 °F (36 6 °C)  71  20  118/63  81  94 %  --   07/07/21 23:34:39  98 5 °F (36 9 °C)  --  18  104/59  74  --  --   07/07/21 2100  --  --  --  --  --  91 %  None (Room air)   07/07/21 17:53:04  97 9 °F (36 6 °C)  87  18  106/64  78  94 %  --   07/07/21 1300  --  84  18  106/63  79  95 %  --   07/07/21 1000  --  90  --  109/68  86  95 %  --     Pertinent Labs/Diagnostic Test Results:     7/7 Xray R femur - No acute fracture  Findings in the right hemipelvis consistent with sclerotic prostate osseous metastatic disease  7/7 Xray R tib-fib - no acute disease/injury  7/7 BLE arterial duplex - RIGHT LOWER LIMB:  ABNORMAL:  Evaluation shows a >75% stenosis at the common femoral artery/SFA bifurcation    Velocities may be higher than obtained secondary to severe calcification, attenuation/shadowing, and poor visibility of area  Ankle/Brachial index:  0 mmHg  Metatarsal pressure: 0 mmHg  Great toe pressure:   0 mmHg, below the healing range  PVR/ PPG tracings: Flat line  LEFT LOWER LIMB:  Ankle/Brachial index: 1 15, normal   Metatarsal pressure:  134 mmHg  Great toe pressure:  80 mmHg, within the healing range  PVR/ PPG tracings are normal      7/7 CTA abd with run off - High-grade distal right common femoral artery, proximal profunda femoris artery, and multifocal superficial femoral artery stenoses with probable three-vessel runoff to the foot  Distal runoff is limited due to dense calcifications  Mildly atherosclerotic left lower extremity runoff with probable three-vessel runoff to the foot  Distal runoff is limited due to dense calcifications  Multiple sclerotic bony lesions consistent with metastases given the patient's history of prostate cancer  Questionable cholelithiasis      Results from last 7 days   Lab Units 07/08/21  0405 07/07/21  1305 07/07/21  1050   WBC Thousand/uL 5 05 5 29 6 39   HEMOGLOBIN g/dL 10 6* 11 1* 11 2*   HEMATOCRIT % 34 4* 35 5* 35 1*   PLATELETS Thousands/uL 211 236 231   NEUTROS ABS Thousands/µL  --   --  4 27         Results from last 7 days   Lab Units 07/08/21  0405 07/07/21  1050   SODIUM mmol/L 133* 129*   POTASSIUM mmol/L 4 1 4 4   CHLORIDE mmol/L 105 100   CO2 mmol/L 22 19*   ANION GAP mmol/L 6 10   BUN mg/dL 20 29*   CREATININE mg/dL 0 98 1 28   EGFR ml/min/1 73sq m 75 54   CALCIUM mg/dL 9 6 9 4     Results from last 7 days   Lab Units 07/07/21  1050   AST U/L 21   ALT U/L 31   ALK PHOS U/L 60   TOTAL PROTEIN g/dL 7 0   ALBUMIN g/dL 3 7   TOTAL BILIRUBIN mg/dL 1 20*         Results from last 7 days   Lab Units 07/08/21  0405 07/07/21  1050   GLUCOSE RANDOM mg/dL 90 97     Results from last 7 days   Lab Units 07/08/21  0405 07/07/21  1942 07/07/21  1305 07/07/21  1050   PROTIME seconds 14 3  --  13 7 13 6   INR  1 11  --  1 05 1 04   PTT seconds 134* >210* 27 27     ED Treatment:   Medication Administration from 07/07/2021 0930 to 07/07/2021 1739    Date/Time Order Dose Route Action   07/07/2021 1334 heparin (porcine) injection 7,600 Units 7,600 Units Intravenous Given   07/07/2021 1333 heparin (porcine) 25,000 units in 0 45% NaCl 250 mL infusion (premix) 18 Units/kg/hr Intravenous New Bag   07/07/2021 1511 iohexol (OMNIPAQUE) 350 MG/ML injection (SINGLE-DOSE) 100 mL 100 mL Intravenous Given        Past Medical History:   Diagnosis Date    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    High cholesterol     Prostate cancer ([facility] [address] )      Present on Admission:   Chronic combined systolic and diastolic CHF (congestive heart failure) ([facility])   Ischemic cardiomyopathy   Anemia    Admitting Diagnosis: Ischemic cardiomyopathy [I25 5]  Right leg weakness [R29 898]  PAD (peripheral artery disease) ([facility]) [A69 1]  Chronic diastolic CHF (congestive heart failure) ([facility]) [I50 32]  Ischemic leg pain [M79 606, I99 8]     Age/Sex: 76 y o  male     Admission Orders:    Scheduled Medications:    atorvastatin, 80 mg, Oral, Daily With Dinner  clopidogrel, 75 mg, Oral, Daily  ferrous sulfate, 325 mg, Oral, BID With Meals  [START ON 7/9/2021] metoprolol succinate, 12 5 mg, Oral, Daily  pantoprazole, 40 mg, Oral, Early Morning  tamsulosin, 0 4 mg, Oral, Daily With Dinner      Continuous IV Infusions:  heparin (porcine), 3-30 Units/kg/hr (Order-Specific), Intravenous, Titrated      PRN Meds:  acetaminophen, 650 mg, Oral, Q6H PRN  heparin (porcine), 3,800 Units, Intravenous, Q1H PRN  heparin (porcine), 7,600 Units, Intravenous, Q1H PRN  HYDROmorphone, 0 5 mg, Intravenous, Q4H PRN  morphine injection, 2 mg, Intravenous, Q4H PRN  oxyCODONE, 5 mg, Oral, Q4H PRN - x 2 7/7    SCDs  Heparin drip  OOB as ashley   ADA diet  NPO for OR 7/9  IP CONSULT TO CASE MANAGEMENT  IP CONSULT TO VASCULAR SURGERY  IP CONSULT TO CARDIOLOGY    Network Utilization Review Department  ATTENTION: Please call with any questions or concerns to [phone] and carefully listen to the prompts so that you are directed to the right person  All voicemails are confidential   Reyna List all requests for admission clinical reviews, approved or denied determinations and any other requests to dedicated fax number below belonging to the campus where the patient is receiving treatment   List of dedicated fax numbers for the Facilities:  [address] DENIALS (Administrative/Medical Necessity) [phone]   [address] (Maternity/NICU/Pediatrics) [phone]   [address] [place] [address] [address] [place] [address] [address] [facility] [place] [address] [phone]

## 2021-07-08 NOTE — UTILIZATION REVIEW
Inpatient Admission Authorization Request   NOTIFICATION OF INPATIENT ADMISSION/INPATIENT AUTHORIZATION REQUEST   SERVICING FACILITY:   [facility]  Address: [address] [place] [address], [address] [place] [address]  Tax ID: [id number]  NPI: [id number]  Place of Service: Inpatient [address] [facility] Code: 24     ATTENDING PROVIDER:  Attending Name and NPI#: Nancy Moran Md [[id number]]  Address: [address] [place] [address], [address] [place] [address]  Phone: [phone]     UTILIZATION REVIEW CONTACT:  Katie Miles Utilization   Network Utilization Review Department  Phone: [phone]  Fax: [phone]  Email: [email or web address]     PHYSICIAN ADVISORY SERVICES:  FOR [id number] REVIEW - MEDICAL NECESSITY DENIAL  Phone: [phone]  Fax: [phone]  Email: [email or web address]     TYPE OF REQUEST:  Inpatient Status     ADMISSION INFORMATION:  ADMISSION DATE/TIME: 7/7/21  1:55 PM  PATIENT DIAGNOSIS CODE/DESCRIPTION:  Ischemic cardiomyopathy [I25 5]  Right leg weakness [R29 898]  PAD (peripheral artery disease) ([facility]) [T14 0]  Chronic diastolic CHF (congestive heart failure) ([facility]) [I50 32]  Ischemic leg pain [M79 606, I99 8]  DISCHARGE DATE/TIME: No discharge date for patient encounter  DISCHARGE DISPOSITION (IF DISCHARGED): Home with Home Health Care     IMPORTANT INFORMATION:  Please contact the Katie Miles directly with any questions or concerns regarding this request  Department voicemails are confidential     Send requests for admission clinical reviews, concurrent reviews, approvals, and administrative denials due to lack of clinical to fax [phone]

## 2021-07-08 NOTE — ASSESSMENT & PLAN NOTE
Wt Readings from Last 3 Encounters:   07/07/21 97 5 kg (215 lb)   07/06/21 97 3 kg (214 lb 8 oz)   07/01/21 98 8 kg (217 lb 12 8 oz)     · TTE 06/23/2021: LVEF 20%  · Patient is currently euvolemic  · Daily weight, intake and output  · Continue metoprolol, hold home lasix for OR  · euvolemic

## 2021-07-08 NOTE — PROGRESS NOTES
Heart Failure/ Pulmonary Hypertension Progress Note - Frank Rene 76 y o  male MRN: [id number]    Unit/Bed#: [facility] 823-01 Encounter: [id number]      Assessment:    Principal Problem:    Ischemic leg pain  Active Problems:    Chronic combined systolic and diastolic CHF (congestive heart failure) (HCC)    S/P AVR    Anemia    Ischemic cardiomyopathy    Prostate cancer (HCC)      Subjective:   Patient seen and examined  No significant events overnight  Plan for possible right common femoral endarterectomy/angiogram/fasciotomies tomorrow  Labs improving  MAP stable  Objective: Intake/ Output: -950  Weight: 215 lbs  Tele: not on tele  Preoperative risk assessment  --Based on the Revised Cardiac Risk Index Magdalenchase Carmona Criteria), patient's estimated risk of adverse outcome with non-cardiac surgery is considered "high"  His estimated rate of MI, pulmonary edema, VF, Cardiac arrest, or CHB is >11%  --Patient's functional capacity is : likely <3 METS  --Advise judicious administration of IVF and close monitoring of fluid status given patient;'s systolic CHF and risk of volume overload  --Advise cardiac anesthesia  --12 lead EKG from 6/22/21 demonstrates sinus rhythm with LBBB       RLE ischemia  LEAD with >75% stenosis of the CFA/SFA  For CTA with runoff  Management per vascular  Plan for possible surgical intervention tomorrow  Remains On heparin gtt       Newly diagnosed HFrEF; LVEF 20%; LVIDd 5 9 cm; NYHA III; ACC/AHA Stage C  Etiology: ischemic +/- progression of valvular disease +/- chemotherapy (on abiraterone and leuprolide with unclear safety profile)  Examines WWP  Euvolemic  MAP stable  Will continue Metoprolol only for now until post op period  Will need to re-evaluate remainder of medical regimen/dosing  prior to discharge       TTE 06/14/2021 (at [facility] pre- and post-TAVR, per report): LVEF 20%   LVIDd 5 4 cm  "entire apex, mid and apical anterior septum, mid and apical inferior septum, mid and apical inferior wall, mid inferolateral wall, and mid lateral wall are akinetic " Trace MR and TR  Pre-TAVR: LVOT VTI 12 0 cm  JOSE 0 47 cm^2  Post: bioprosthetic AV valve present; JOSE 2 41 cm^2                  TTE 06/23/2021: LVEF 20%  LVIDd 5 9 cm  Grade 2 DD  "dyskinesis and aneurysmal deformity of the apical anterior, mid anteroseptal, apical inferior, apical septal, and apical wall(s) " Normal RV size and RVSF  Mild MR  AV bioprosthesis with normal leaflet separation  Trace TR      Neurohormonal Blockade:  --Beta-Blocker: Metoprolol succinate 12 5 mg BID  --ACEi, ARB or ARNi: Entresto 24/26 mg BID -on hold    (or SVR reduction)  --Aldosterone Receptor Blocker: Spironolactone 12 5 mg daily - on hold  --SGLT2-I:   --Diuretic: Lasix 40 mg daily-on hold    Sudden Cardiac Death Risk Reduction:  --ICD: EF 20%  Has LIfeVEst    Interrogation:     Electrical Resynchronization:  -- LBBB  Interrogation:     Advanced Therapies (If appropriate): --Inotrope:  --LVAD/Transplant Candidacy:    Prostate cancer with bony metastases              [facility]               Taking SQ leuprolide (Eligard) and abiraterone (Zytiga) - safety of abiraterone in patients with LVEF <50% not established per FDA  [place]-analysis of drug noted increased incidence and relative risk of CV toxicity      Coronary artery disease              Without active chest pain  S/pstenting to circumflex and RCA in 2001  [facility] 06/09/2021 (per documentation):"diffuse moderate prox and mid disease/ISR, severe OM lesion and total mid LAD  "              Continue on Plavix, statin, and BB as above      Aortic stenosis      S/p TAVR (Evolut Pro+ 29mm) on 06/14/2021 at [facility]        Continues on Plavix       Hyperlipidemia  No recent FLP  Continue on atorvastatin 80 mg daily       History of atrial tachycardia: s/p ablation at [facility] in 2018    Benign prostatic hyperplasia       Review of Systems   All other systems reviewed and are negative  [employer] (day, reason): Moser catheter (day, reason):    Vitals: Blood pressure 118/63, pulse 71, temperature 97 9 °F (36 6 °C), resp  rate 20, height 5' 7" (1 702 m), weight 97 5 kg (215 lb), SpO2 94 %  , Body mass index is 33 67 kg/m² , I/O last 3 completed shifts:  In: -   Out: 950 [Urine:950]  No intake/output data recorded  Wt Readings from Last 3 Encounters:   07/07/21 97 5 kg (215 lb)   07/06/21 97 3 kg (214 lb 8 oz)   07/01/21 98 8 kg (217 lb 12 8 oz)       Intake/Output Summary (Last 24 hours) at 7/8/2021 1034  Last data filed at 7/8/2021 0404  Gross per 24 hour   Intake --   Output 950 ml   Net -950 ml     I/O last 3 completed shifts:  In: -   Out: 950 [Urine:950]    Not on tele         Physical Exam:  Vitals:    07/07/21 1753 07/07/21 2100 07/07/21 2334 07/08/21 0804   BP: 106/64  104/59 118/63   BP Location:       Pulse: 87   71   Resp: 18  18 20   Temp: 97 9 °F (36 6 °C)  98 5 °F (36 9 °C) 97 9 °F (36 6 °C)   TempSrc:       SpO2: 94% 91%  94%   Weight:       Height:           GEN: Royce L Free appears well, alert and oriented x 3, pleasant and cooperative   HEENT: pupils equal, round, and reactive to light; extraocular muscles intact  NECK: supple, no carotid bruits   HEART: regular rhythm, normal S1 and S2, no murmurs, clicks, gallops or rubs, JVP is flat   LUNGS: clear to auscultation bilaterally; no wheezes, rales, or rhonchi   ABDOMEN: normal bowel sounds, soft, no tenderness, no distention  EXTREMITIES: Unable to palpate pedal pulses, right LE cool, LLE warm to the touch,; no clubbing, cyanosis, or edema  NEURO: no focal findings   SKIN: normal without suspicious lesions on exposed skin      Current Facility-Administered Medications:     acetaminophen (TYLENOL) tablet 650 mg, 650 mg, Oral, Q6H PRN, Michael Bishop MD    atorvastatin (LIPITOR) tablet 80 mg, 80 mg, Oral, Daily With Nba Lindsay MD, 80 mg at 07/07/21 1817    clopidogrel (PLAVIX) tablet 75 mg, 75 mg, Oral, Daily, Portia Morocho MD, 75 mg at 07/08/21 8738    ferrous sulfate tablet 325 mg, 325 mg, Oral, BID With Meals, Portia Morocho MD, 325 mg at 07/07/21 1817    heparin (porcine) 25,000 units in 0 45% NaCl 250 mL infusion (premix), 3-30 Units/kg/hr (Order-Specific), Intravenous, Titrated, Portia Morocho MD, Last Rate: 11 4 mL/hr at 07/08/21 0914, 12 Units/kg/hr at 07/08/21 0914    heparin (porcine) injection 3,800 Units, 3,800 Units, Intravenous, Q1H PRN, Portia Morocho MD    heparin (porcine) injection 7,600 Units, 7,600 Units, Intravenous, Q1H PRN, Portia Morocho MD    HYDROmorphone (DILAUDID) injection 0 5 mg, 0 5 mg, Intravenous, Q4H PRN, Portia Morocho MD    metoprolol succinate (TOPROL-XL) 24 hr tablet 12 5 mg, 12 5 mg, Oral, Daily, Livier Garcia MD, 12 5 mg at 07/08/21 0927    morphine injection 2 mg, 2 mg, Intravenous, Q4H PRN, Portia Morocho MD    oxyCODONE (ROXICODONE) IR tablet 5 mg, 5 mg, Oral, Q4H PRN, Portia Morocho MD, 5 mg at 07/07/21 2244    pantoprazole (PROTONIX) EC tablet 40 mg, 40 mg, Oral, Early Morning, Livier Garcia MD, 40 mg at 07/08/21 0524    tamsulosin (FLOMAX) capsule 0 4 mg, 0 4 mg, Oral, Daily With Liliana Shaver MD, 0 4 mg at 07/07/21 1817      Labs & Results:        Results from last 7 days   Lab Units 07/08/21  0405 07/07/21  1305 07/07/21  1050   WBC Thousand/uL 5 05 5 29 6 39   HEMOGLOBIN g/dL 10 6* 11 1* 11 2*   HEMATOCRIT % 34 4* 35 5* 35 1*   PLATELETS Thousands/uL 211 236 231         Results from last 7 days   Lab Units 07/08/21  0405 07/07/21  1050   POTASSIUM mmol/L 4 1 4 4   CHLORIDE mmol/L 105 100   CO2 mmol/L 22 19*   BUN mg/dL 20 29*   CREATININE mg/dL 0 98 1 28   CALCIUM mg/dL 9 6 9 4   ALK PHOS U/L  --  60   ALT U/L  --  31   AST U/L  --  21     Results from last 7 days   Lab Units 07/08/21  0405 07/07/21  1305 07/07/21  1050   INR  1 11 1 05 1 04         Counseling / Coordination of Care  Total floor / unit time spent today 20 minutes    Greater than 50% of total time was spent with the patient and / or family counseling and / or coordination of care  A description of the counseling / coordination of care: 20  Thank you for the opportunity to participate in the care of this patient  Katelynn Catalan

## 2021-07-08 NOTE — CASE MANAGEMENT
LOS day 1  Bundle No   30 day readmission yes  Readmission risk score 16 low    Met with the patient in his room, 823, and explained the role of the  and the Case management department  Patient is a 76year old male admitted with right lower leg pain and weakness  Patient lives with his son Kelsey Merrill and states he requires use of a walker and electric scooter for ambulation as well as a stair glide for the steps leading into his home  Patient lives in a 3 story apartment building, which he owns and lives on the second floor and his son lives on the third floor  Patient has approx 12 steps to enter the home but uses a stari glide to enter the home as he has difficulty using stairs  Patient uses [address] and denies having difficulty paying for his medications  Patient states over the past few months he is having greater difficulty cooking his meals and has had a [facility] coming in and believes they made a referral to [facility]  Patient denies being in rehab or STR  Patient denies mental health problems or inpatient psychiatric stay  Patient goes to the [place] for medical care and cannot recall his PCP name as it changes frequently  Patient denies having POA/living will/advance directive and states he has the information on how to obtain this  His Primary emergency contacts are his son David Hillties  and daughter Teri Russell   Patient has SSI income but 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  Patient states he will have his son Kelsey Merrill or daughter Christopher Paul pick him up at [place]  CM reviewed d/c planning process including the following: identifying help at home, patient preference for d/c planning needs, Discharge Lounge, [facility] Meds to Bed program, availability of treatment team to discuss questions or concerns patient and/or family may have regarding understanding medications and recognizing signs and symptoms once discharged    CM also encouraged patient to follow up with all recommended appointments after discharge  Patient advised of importance for patient and family to participate in managing patients medical well being

## 2021-07-08 NOTE — PROGRESS NOTES
Vascular Surgery  Progress Note    Pt scheduled for R CFAE tomorrow- Fri, 7/9 (Dr Terra Muir)        Jayjay Everett, MELODIE  7/8/2021

## 2021-07-08 NOTE — PROGRESS NOTES
Progress Note - General Surgery   Elizabet Rene 76 y o  male MRN: [id number]  [facility] Encounter: [id number]    Assessment:  68-year-old male with past medical history of CHF, anemia, cardiomyopathy s/p TAVR on 6/14/21  Now presenting with worsening RLE rest pain of one month duration, loss of sensation to the right foot, and right foot weakness  Concern that patient may indeed be [place] 2A however he is a poor historian and the time frame of this disease process is anywhere from timing of TAVR to one week  Patient with decreased motor and sensation however again chronicity is unknown    RLE - Palp Fem, Doppler AT/Peroneal  LLE -Palp Fem, Doppler DP/PT/Peroneal    Plan:  NPO overnight  Continue heparin drip  Cardiology recs appreciated, no further workup however they will continue to follow    Discussion as to whether not the patient will be going to the operating room today versus tomorrow 07/09/2021 for right common femoral endarterectomy with potential completion angiogram and angioplasty, possible fasciotomies    Subjective/Objective   Chief Complaint:     Subjective: No acute events overnight  No changes to RLE overnight  Objective:     Blood pressure 104/59, pulse 87, temperature 98 5 °F (36 9 °C), resp  rate 18, height 5' 7" (1 702 m), weight 97 5 kg (215 lb), SpO2 91 %  ,Body mass index is 33 67 kg/m²        Intake/Output Summary (Last 24 hours) at 7/8/2021 0834  Last data filed at 7/8/2021 0404  Gross per 24 hour   Intake --   Output 950 ml   Net -950 ml       Invasive Devices     Peripheral Intravenous Line            Peripheral IV 07/07/21 Right Antecubital <1 day    Peripheral IV 07/07/21 Right Forearm <1 day                Physical Exam: General: AAOx3  Head: normocephalic, atraumatic  Neck: supple, trachea midline  Respiratory: BS b/l  Abdomen: Soft, NT, no rebound/guarding  Heart: RRR, S1s2  Patient with decreased motor and sensation however again chronicity is unknown    RLE - Palp Fem, Doppler AT/Peroneal  LLE -Palp Fem, Doppler DP/PT/Peroneal      Lab, Imaging and other studies:  I have personally reviewed pertinent lab results    , CBC:   Lab Results   Component Value Date    WBC 5 05 07/08/2021    HGB 10 6 (L) 07/08/2021    HCT 34 4 (L) 07/08/2021    MCV 82 07/08/2021     07/08/2021    MCH 25 3 (L) 07/08/2021    MCHC 30 8 (L) 07/08/2021    RDW 16 1 (H) 07/08/2021    MPV 10 5 07/08/2021    NRBC 0 07/07/2021   , CMP:   Lab Results   Component Value Date    SODIUM 133 (L) 07/08/2021    K 4 1 07/08/2021     07/08/2021    CO2 22 07/08/2021    BUN 20 07/08/2021    CREATININE 0 98 07/08/2021    CALCIUM 9 6 07/08/2021    AST 21 07/07/2021    ALT 31 07/07/2021    ALKPHOS 60 07/07/2021    EGFR 75 07/08/2021     VTE Pharmacologic Prophylaxis: Heparin  VTE Mechanical Prophylaxis: sequential compression device

## 2021-07-08 NOTE — ASSESSMENT & PLAN NOTE
· S/P  Chemotherapy and radiation in [place]  · Patient was receiving chemo every 4 weeks in [place], last chemo 4 weeks ago  · Patient wishes to establish care with Oncology at [facility]

## 2021-07-08 NOTE — PROGRESS NOTES
[facility] [facility]  Progress Note Vero Roberts Free 1945, 76 y o  male MRN: [id number]  Unit/Bed#: [facility] 823-01 Encounter: [id number]  Primary Care Provider: Ayde De Leon MD   Date and time admitted to hospital: 7/7/2021  9:48 AM    * Ischemic leg pain  Assessment & Plan  · Patient with history of ischemic cardiomyopathy, chronic CHF, presented with right lower leg pain  · LEAD reveals >75% stenosis of the R CFA/SFA  · Vascular surgery consult appreciated - line for endarterectomy on 07/09  · Continue heparin drip  · CTA runoff: High-grade distal right common femoral artery, proximal profunda femoris artery, and multifocal superficial femoral artery stenoses with probable three-vessel runoff to the foot  Distal runoff is limited due to dense calcifications  · N p o  after midnight    Prostate cancer [facility])  Assessment & Plan  · S/P  Chemotherapy and radiation in [place]  · Patient was receiving chemo every 4 weeks in [place], last chemo 4 weeks ago  · Patient wishes to establish care with Oncology at [facility]    Ischemic cardiomyopathy  Assessment & Plan  · Continue Toprol XL 12 5 mg daily, recently dose was reduced due to low blood pressure  · Discussed with vascular surgery, okay to continue Plavix  · Continue statin  · Cardio consulted for preop clearance - patient is high risk but acceptable with recommendations to continue beta-blocker and statin perioperatively and he struck Plavix as soon as possible postoperatively  · most recent echo on 06/23/2021 showed LVEF of 20%  · Patient was prescribed a LifeVest last admission, currently not using, states he sent it back because it was loose fitting    · Reviewed [place] records, Cardiac catheterization 6/9/21: diffuse moderate prox and mid disease/ISR, severe OM lesion, total mid LAD (likely infarcted territory given AK and aneurysmal)    Anemia  Assessment & Plan  Hemoglobin stable  Iron supplement    S/P AVR  Assessment & Plan  Status post TAVR in [place] on 21    Chronic combined systolic and diastolic CHF (congestive heart failure) ([facility])  Assessment & Plan  Wt Readings from Last 3 Encounters:   21 97 5 kg (215 lb)   21 97 3 kg (214 lb 8 oz)   21 98 8 kg (217 lb 12 8 oz)     · TTE 2021: LVEF 20%  · Patient is currently euvolemic  · Daily weight, intake and output  · Continue metoprolol, hold home lasix for OR  · euvolemic        [facility] Internal Medicine Progress Note  Patient: Janeth Rene 76 y o  male   MRN: [id number]  PCP: Moisés Kemp MD  Unit/Bed#: [facility] 823-01 Encounter: [id number]  Date Of Visit: 21    Assessment:    Principal Problem:    Ischemic leg pain  Active Problems:    Chronic combined systolic and diastolic CHF (congestive heart failure) ([facility])    S/P AVR    Anemia    Ischemic cardiomyopathy    Prostate cancer ([facility] [address] )      Plan:         VTE Pharmacologic Prophylaxis:     Moderate Risk (Score 3-4) - Pharmacological DVT Prophylaxis Ordered: Heparin Drip  Mechanical VTE Prophylaxis in Place: Yes    Patient Centered Rounds: I have performed bedside rounds with nursing staff today  Discussions with Specialists or Other Care Team Provider:     Education and Discussions with Family / Patient: patient    Time Spent for Care: 30 minutes  More than 50% of total time spent on counseling and coordination of care as described above  Current Length of Stay: 1 day(s)  Current Patient Status: Inpatient     Certification Statement: The patient will continue to require additional inpatient hospital stay due to OR tomorrow    Discharge Plan / Estimated Discharge Date: Anticipate discharge in 48-72 hrs to home      Code Status: Level 1 - Full Code      Subjective:   Feels ok, c/o pain in right leg    Objective:     Vitals:   Temp (24hrs), Av 1 °F (36 7 °C), Min:97 9 °F (36 6 °C), Max:98 5 °F (36 9 °C)    Temp:  [97 9 °F (36 6 °C)-98 5 °F (36 9 °C)] 97 9 °F (36 6 °C)  HR:  [71-87] 73  Resp: [18-20] 20  BP: (104-118)/(59-64) 116/63  SpO2:  [91 %-96 %] 96 %  Body mass index is 33 67 kg/m²  Input and Output Summary (last 24 hours): Intake/Output Summary (Last 24 hours) at 7/8/2021 1743  Last data filed at 7/8/2021 1300  Gross per 24 hour   Intake --   Output 1225 ml   Net -1225 ml       Physical Exam:   Physical Exam  Vitals reviewed  HENT:      Head: Normocephalic and atraumatic  Mouth/Throat:      Mouth: Mucous membranes are moist    Cardiovascular:      Rate and Rhythm: Normal rate and regular rhythm  Heart sounds: Normal heart sounds  Pulmonary:      Effort: Pulmonary effort is normal  No respiratory distress  Breath sounds: Normal breath sounds  No wheezing  Abdominal:      General: There is no distension  Palpations: Abdomen is soft  Tenderness: There is no abdominal tenderness  Musculoskeletal:      Right lower leg: No edema  Left lower leg: No edema  Neurological:      Mental Status: He is alert and oriented to person, place, and time           Additional Data:     Labs:  Results from last 7 days   Lab Units 07/08/21  0405 07/07/21  1050   WBC Thousand/uL 5 05 6 39   HEMOGLOBIN g/dL 10 6* 11 2*   HEMATOCRIT % 34 4* 35 1*   PLATELETS Thousands/uL 211 231   NEUTROS PCT %  --  66   LYMPHS PCT %  --  19   MONOS PCT %  --  11   EOS PCT %  --  2     Results from last 7 days   Lab Units 07/08/21  0405 07/07/21  1050   SODIUM mmol/L 133* 129*   POTASSIUM mmol/L 4 1 4 4   CHLORIDE mmol/L 105 100   CO2 mmol/L 22 19*   BUN mg/dL 20 29*   CREATININE mg/dL 0 98 1 28   ANION GAP mmol/L 6 10   CALCIUM mg/dL 9 6 9 4   ALBUMIN g/dL  --  3 7   TOTAL BILIRUBIN mg/dL  --  1 20*   ALK PHOS U/L  --  60   ALT U/L  --  31   AST U/L  --  21   GLUCOSE RANDOM mg/dL 90 97     Results from last 7 days   Lab Units 07/08/21  0405   INR  1 11                   Imaging: Reviewed radiology reports from this admission including: CTA    Recent Cultures (last 7 days): Lines/Drains:  Invasive Devices     Peripheral Intravenous Line            Peripheral IV 07/07/21 Right Antecubital 1 day    Peripheral IV 07/07/21 Right Forearm 1 day                Telemetry:        Last 24 Hours Medication List:   Current Facility-Administered Medications   Medication Dose Route Frequency Provider Last Rate    acetaminophen  650 mg Oral Q6H PRN Dawn Toney MD      atorvastatin  80 mg Oral Daily With Sybil Harvey MD      [START ON 7/9/2021] chlorhexidine  15 mL Swish & Spit Once MELODIE Cai      clopidogrel  75 mg Oral Daily Dawn Toney MD      ferrous sulfate  325 mg Oral BID With Meals Dawn Toney MD      heparin (porcine)  3-30 Units/kg/hr (Order-Specific) Intravenous Titrated Dawn Toney MD 14 Units/kg/hr (07/08/21 1400)    heparin (porcine)  3,800 Units Intravenous Q1H PRN Dawn Toney MD      heparin (porcine)  7,600 Units Intravenous Q1H PRN Dawn Toney MD      HYDROmorphone  0 5 mg Intravenous Q4H PRN Dawn Toney MD      [START ON 7/9/2021] metoprolol succinate  12 5 mg Oral Daily OMAIRA Du      morphine injection  2 mg Intravenous Q4H PRN Dawn Toney MD      oxyCODONE  5 mg Oral Q4H PRN Dawn Toney MD      pantoprazole  40 mg Oral Early Morning Dawn Toney MD      [START ON 7/9/2021] sodium chloride  50 mL/hr Intravenous Continuous MELODIE Cai      tamsulosin  0 4 mg Oral Daily With Sybil Harvey MD          Today, Patient Was Seen By: Mann Zarate MD    ** Please Note: This note has been constructed using a voice recognition system   **

## 2021-07-09 ENCOUNTER — TELEPHONE (OUTPATIENT)
Dept: VASCULAR SURGERY | Facility: CLINIC | Age: 76
End: 2021-07-09

## 2021-07-09 ENCOUNTER — ANESTHESIA (INPATIENT)
Dept: PERIOP | Facility: HOSPITAL | Age: 76
DRG: 253 | End: 2021-07-09
Payer: COMMERCIAL

## 2021-07-09 ENCOUNTER — ANESTHESIA EVENT (INPATIENT)
Dept: PERIOP | Facility: HOSPITAL | Age: 76
DRG: 253 | End: 2021-07-09
Payer: COMMERCIAL

## 2021-07-09 LAB
ABO GROUP BLD: NORMAL
ABO GROUP BLD: NORMAL
ANION GAP SERPL CALCULATED.3IONS-SCNC: 10 MMOL/L (ref 4–13)
APTT PPP: 102 SECONDS (ref 23–37)
APTT PPP: 57 SECONDS (ref 23–37)
ATRIAL RATE: 79 BPM
BLD GP AB SCN SERPL QL: NEGATIVE
BUN SERPL-MCNC: 18 MG/DL (ref 5–25)
CALCIUM SERPL-MCNC: 9.3 MG/DL (ref 8.3–10.1)
CHLORIDE SERPL-SCNC: 105 MMOL/L (ref 100–108)
CO2 SERPL-SCNC: 21 MMOL/L (ref 21–32)
CREAT SERPL-MCNC: 1.03 MG/DL (ref 0.6–1.3)
ERYTHROCYTE [DISTWIDTH] IN BLOOD BY AUTOMATED COUNT: 16 % (ref 11.6–15.1)
GFR SERPL CREATININE-BSD FRML MDRD: 71 ML/MIN/1.73SQ M
GLUCOSE SERPL-MCNC: 92 MG/DL (ref 65–140)
HCT VFR BLD AUTO: 33.9 % (ref 36.5–49.3)
HGB BLD-MCNC: 10.5 G/DL (ref 12–17)
KCT BLD-ACNC: 178 SEC (ref 89–137)
KCT BLD-ACNC: 237 SEC (ref 89–137)
MCH RBC QN AUTO: 25.4 PG (ref 26.8–34.3)
MCHC RBC AUTO-ENTMCNC: 31 G/DL (ref 31.4–37.4)
MCV RBC AUTO: 82 FL (ref 82–98)
P AXIS: 59 DEGREES
PLATELET # BLD AUTO: 203 THOUSANDS/UL (ref 149–390)
PMV BLD AUTO: 10.2 FL (ref 8.9–12.7)
POTASSIUM SERPL-SCNC: 4.1 MMOL/L (ref 3.5–5.3)
PR INTERVAL: 164 MS
QRS AXIS: -18 DEGREES
QRSD INTERVAL: 156 MS
QT INTERVAL: 392 MS
QTC INTERVAL: 449 MS
RBC # BLD AUTO: 4.13 MILLION/UL (ref 3.88–5.62)
RH BLD: POSITIVE
RH BLD: POSITIVE
SODIUM SERPL-SCNC: 136 MMOL/L (ref 136–145)
SPECIMEN EXPIRATION DATE: NORMAL
SPECIMEN SOURCE: ABNORMAL
SPECIMEN SOURCE: ABNORMAL
T WAVE AXIS: 125 DEGREES
VENTRICULAR RATE: 79 BPM
WBC # BLD AUTO: 4.91 THOUSAND/UL (ref 4.31–10.16)

## 2021-07-09 PROCEDURE — 99024 POSTOP FOLLOW-UP VISIT: CPT | Performed by: PHYSICIAN ASSISTANT

## 2021-07-09 PROCEDURE — 86900 BLOOD TYPING SEROLOGIC ABO: CPT | Performed by: HOSPITALIST

## 2021-07-09 PROCEDURE — 04CK0ZZ EXTIRPATION OF MATTER FROM RIGHT FEMORAL ARTERY, OPEN APPROACH: ICD-10-PCS | Performed by: SURGERY

## 2021-07-09 PROCEDURE — 86850 RBC ANTIBODY SCREEN: CPT | Performed by: HOSPITALIST

## 2021-07-09 PROCEDURE — C1781 MESH (IMPLANTABLE): HCPCS | Performed by: SURGERY

## 2021-07-09 PROCEDURE — 85027 COMPLETE CBC AUTOMATED: CPT | Performed by: HOSPITALIST

## 2021-07-09 PROCEDURE — 80048 BASIC METABOLIC PNL TOTAL CA: CPT | Performed by: HOSPITALIST

## 2021-07-09 PROCEDURE — 99231 SBSQ HOSP IP/OBS SF/LOW 25: CPT | Performed by: INTERNAL MEDICINE

## 2021-07-09 PROCEDURE — 99232 SBSQ HOSP IP/OBS MODERATE 35: CPT | Performed by: HOSPITALIST

## 2021-07-09 PROCEDURE — 35372 RECHANNELING OF ARTERY: CPT | Performed by: SURGERY

## 2021-07-09 PROCEDURE — 85730 THROMBOPLASTIN TIME PARTIAL: CPT | Performed by: HOSPITALIST

## 2021-07-09 PROCEDURE — 86920 COMPATIBILITY TEST SPIN: CPT

## 2021-07-09 PROCEDURE — 93010 ELECTROCARDIOGRAM REPORT: CPT | Performed by: INTERNAL MEDICINE

## 2021-07-09 PROCEDURE — 85347 COAGULATION TIME ACTIVATED: CPT

## 2021-07-09 PROCEDURE — 86901 BLOOD TYPING SEROLOGIC RH(D): CPT | Performed by: HOSPITALIST

## 2021-07-09 PROCEDURE — 04UK0KZ SUPPLEMENT RIGHT FEMORAL ARTERY WITH NONAUTOLOGOUS TISSUE SUBSTITUTE, OPEN APPROACH: ICD-10-PCS | Performed by: SURGERY

## 2021-07-09 DEVICE — XENOSURE BIOLOGIC PATCH, 0.8CM X 8CM, EIFU
Type: IMPLANTABLE DEVICE | Site: ARTERIAL | Status: FUNCTIONAL
Brand: XENOSURE BIOLOGIC PATCH

## 2021-07-09 RX ORDER — NEOSTIGMINE METHYLSULFATE 1 MG/ML
INJECTION INTRAVENOUS AS NEEDED
Status: DISCONTINUED | OUTPATIENT
Start: 2021-07-09 | End: 2021-07-09

## 2021-07-09 RX ORDER — HEPARIN SODIUM 1000 [USP'U]/ML
INJECTION, SOLUTION INTRAVENOUS; SUBCUTANEOUS AS NEEDED
Status: DISCONTINUED | OUTPATIENT
Start: 2021-07-09 | End: 2021-07-09

## 2021-07-09 RX ORDER — ONDANSETRON 2 MG/ML
4 INJECTION INTRAMUSCULAR; INTRAVENOUS EVERY 6 HOURS PRN
Status: DISCONTINUED | OUTPATIENT
Start: 2021-07-09 | End: 2021-07-13 | Stop reason: HOSPADM

## 2021-07-09 RX ORDER — ONDANSETRON 2 MG/ML
4 INJECTION INTRAMUSCULAR; INTRAVENOUS ONCE AS NEEDED
Status: DISCONTINUED | OUTPATIENT
Start: 2021-07-09 | End: 2021-07-09 | Stop reason: HOSPADM

## 2021-07-09 RX ORDER — DOCUSATE SODIUM 100 MG/1
100 CAPSULE, LIQUID FILLED ORAL 2 TIMES DAILY
Status: DISCONTINUED | OUTPATIENT
Start: 2021-07-09 | End: 2021-07-13 | Stop reason: HOSPADM

## 2021-07-09 RX ORDER — LIDOCAINE HYDROCHLORIDE 10 MG/ML
INJECTION, SOLUTION EPIDURAL; INFILTRATION; INTRACAUDAL; PERINEURAL AS NEEDED
Status: DISCONTINUED | OUTPATIENT
Start: 2021-07-09 | End: 2021-07-09

## 2021-07-09 RX ORDER — PROPOFOL 10 MG/ML
INJECTION, EMULSION INTRAVENOUS AS NEEDED
Status: DISCONTINUED | OUTPATIENT
Start: 2021-07-09 | End: 2021-07-09

## 2021-07-09 RX ORDER — SODIUM CHLORIDE 9 MG/ML
75 INJECTION, SOLUTION INTRAVENOUS CONTINUOUS
Status: DISPENSED | OUTPATIENT
Start: 2021-07-09 | End: 2021-07-09

## 2021-07-09 RX ORDER — MAGNESIUM HYDROXIDE 1200 MG/15ML
LIQUID ORAL AS NEEDED
Status: DISCONTINUED | OUTPATIENT
Start: 2021-07-09 | End: 2021-07-09 | Stop reason: HOSPADM

## 2021-07-09 RX ORDER — SODIUM CHLORIDE 9 MG/ML
50 INJECTION, SOLUTION INTRAVENOUS CONTINUOUS
Status: DISPENSED | OUTPATIENT
Start: 2021-07-09 | End: 2021-07-09

## 2021-07-09 RX ORDER — FENTANYL CITRATE/PF 50 MCG/ML
25 SYRINGE (ML) INJECTION
Status: COMPLETED | OUTPATIENT
Start: 2021-07-09 | End: 2021-07-09

## 2021-07-09 RX ORDER — CEFAZOLIN SODIUM 1 G/3ML
INJECTION, POWDER, FOR SOLUTION INTRAMUSCULAR; INTRAVENOUS AS NEEDED
Status: DISCONTINUED | OUTPATIENT
Start: 2021-07-09 | End: 2021-07-09

## 2021-07-09 RX ORDER — LIDOCAINE HYDROCHLORIDE 10 MG/ML
INJECTION, SOLUTION EPIDURAL; INFILTRATION; INTRACAUDAL; PERINEURAL
Status: COMPLETED | OUTPATIENT
Start: 2021-07-09 | End: 2021-07-09

## 2021-07-09 RX ORDER — FENTANYL CITRATE 50 UG/ML
INJECTION, SOLUTION INTRAMUSCULAR; INTRAVENOUS AS NEEDED
Status: DISCONTINUED | OUTPATIENT
Start: 2021-07-09 | End: 2021-07-09

## 2021-07-09 RX ORDER — HEPARIN SODIUM 5000 [USP'U]/ML
5000 INJECTION, SOLUTION INTRAVENOUS; SUBCUTANEOUS EVERY 8 HOURS SCHEDULED
Status: DISCONTINUED | OUTPATIENT
Start: 2021-07-10 | End: 2021-07-13 | Stop reason: HOSPADM

## 2021-07-09 RX ORDER — ROCURONIUM BROMIDE 10 MG/ML
INJECTION, SOLUTION INTRAVENOUS AS NEEDED
Status: DISCONTINUED | OUTPATIENT
Start: 2021-07-09 | End: 2021-07-09

## 2021-07-09 RX ORDER — PROTAMINE SULFATE 10 MG/ML
INJECTION, SOLUTION INTRAVENOUS AS NEEDED
Status: DISCONTINUED | OUTPATIENT
Start: 2021-07-09 | End: 2021-07-09

## 2021-07-09 RX ORDER — GLYCOPYRROLATE 0.2 MG/ML
INJECTION INTRAMUSCULAR; INTRAVENOUS AS NEEDED
Status: DISCONTINUED | OUTPATIENT
Start: 2021-07-09 | End: 2021-07-09

## 2021-07-09 RX ADMIN — CEFAZOLIN SODIUM 2000 MG: 1 INJECTION, POWDER, FOR SOLUTION INTRAMUSCULAR; INTRAVENOUS at 13:34

## 2021-07-09 RX ADMIN — HEPARIN SODIUM 9000 UNITS: 1000 INJECTION INTRAVENOUS; SUBCUTANEOUS at 14:41

## 2021-07-09 RX ADMIN — NOREPINEPHRINE BITARTRATE 4 MCG/MIN: 1 INJECTION, SOLUTION, CONCENTRATE INTRAVENOUS at 13:24

## 2021-07-09 RX ADMIN — HEPARIN SODIUM 3800 UNITS: 1000 INJECTION INTRAVENOUS; SUBCUTANEOUS at 05:56

## 2021-07-09 RX ADMIN — NEOSTIGMINE METHYLSULFATE 5 MG: 1 INJECTION INTRAVENOUS at 16:47

## 2021-07-09 RX ADMIN — CLOPIDOGREL BISULFATE 75 MG: 75 TABLET ORAL at 08:58

## 2021-07-09 RX ADMIN — MORPHINE SULFATE 2 MG: 2 INJECTION, SOLUTION INTRAMUSCULAR; INTRAVENOUS at 08:58

## 2021-07-09 RX ADMIN — FENTANYL CITRATE 50 MCG: 50 INJECTION INTRAMUSCULAR; INTRAVENOUS at 17:01

## 2021-07-09 RX ADMIN — FENTANYL CITRATE 50 MCG: 50 INJECTION INTRAMUSCULAR; INTRAVENOUS at 15:47

## 2021-07-09 RX ADMIN — GLYCOPYRROLATE 0.4 MG: 0.2 INJECTION, SOLUTION INTRAMUSCULAR; INTRAVENOUS at 16:47

## 2021-07-09 RX ADMIN — LIDOCAINE HYDROCHLORIDE 50 MG: 10 INJECTION, SOLUTION EPIDURAL; INFILTRATION; INTRACAUDAL; PERINEURAL at 13:19

## 2021-07-09 RX ADMIN — LIDOCAINE HYDROCHLORIDE 1 ML: 10 INJECTION, SOLUTION EPIDURAL; INFILTRATION; INTRACAUDAL; PERINEURAL at 13:40

## 2021-07-09 RX ADMIN — Medication 25 MCG: at 19:59

## 2021-07-09 RX ADMIN — CHLORHEXIDINE GLUCONATE 15 ML: 1.2 SOLUTION ORAL at 05:56

## 2021-07-09 RX ADMIN — PROPOFOL 200 MG: 10 INJECTION, EMULSION INTRAVENOUS at 13:19

## 2021-07-09 RX ADMIN — ROCURONIUM BROMIDE 50 MG: 50 INJECTION, SOLUTION INTRAVENOUS at 13:19

## 2021-07-09 RX ADMIN — HEPARIN SODIUM 13 UNITS/KG/HR: 10000 INJECTION, SOLUTION INTRAVENOUS at 06:02

## 2021-07-09 RX ADMIN — Medication 25 MCG: at 19:50

## 2021-07-09 RX ADMIN — FENTANYL CITRATE 100 MCG: 50 INJECTION INTRAMUSCULAR; INTRAVENOUS at 13:19

## 2021-07-09 RX ADMIN — SODIUM CHLORIDE: 0.9 INJECTION, SOLUTION INTRAVENOUS at 15:59

## 2021-07-09 RX ADMIN — PROTAMINE SULFATE 25 MG: 10 INJECTION, SOLUTION INTRAVENOUS at 15:58

## 2021-07-09 RX ADMIN — OXYCODONE HYDROCHLORIDE 5 MG: 5 TABLET ORAL at 21:53

## 2021-07-09 RX ADMIN — Medication 25 MCG: at 18:44

## 2021-07-09 RX ADMIN — Medication 25 MCG: at 18:04

## 2021-07-09 RX ADMIN — PANTOPRAZOLE SODIUM 40 MG: 40 TABLET, DELAYED RELEASE ORAL at 05:57

## 2021-07-09 RX ADMIN — METOPROLOL SUCCINATE 12.5 MG: 25 TABLET, FILM COATED, EXTENDED RELEASE ORAL at 08:58

## 2021-07-09 RX ADMIN — SODIUM CHLORIDE 50 ML/HR: 0.9 INJECTION, SOLUTION INTRAVENOUS at 05:56

## 2021-07-09 NOTE — PROGRESS NOTES
[address] [facility]  Progress Note Linus Claudio Free 1945, 76 y o  male MRN: [id number]  Unit/Bed#: [facility] [place] Encounter: [id number]  Primary Care Provider: Thuy Guy MD   Date and time admitted to hospital: 7/7/2021  9:48 AM    * Ischemic leg pain  Assessment & Plan  · Patient with history of ischemic cardiomyopathy, chronic CHF, presented with right lower leg pain  · LEAD reveals >75% stenosis of the R CFA/SFA  · Vascular surgery consult appreciated  · CTA runoff: High-grade distal right common femoral artery, proximal profunda femoris artery, and multifocal superficial femoral artery stenoses with probable three-vessel runoff to the foot  Distal runoff is limited due to dense calcifications  · S/p endarterectomy today 07/09  · psot op AC per vascular    Prostate cancer [facility])  Assessment & Plan  · S/P  Chemotherapy and radiation in [place]  · Patient was receiving chemo every 4 weeks in [place], last chemo 4 weeks ago  · Patient wishes to establish care with Oncology at [facility]    Ischemic cardiomyopathy  Assessment & Plan  · Continue Toprol XL 12 5 mg daily, recently dose was reduced due to low blood pressure  · Discussed with vascular surgery, okay to continue Plavix  · Continue statin  · Cardio consulted for preop clearance - patient is high risk but acceptable with recommendations to continue beta-blocker and statin perioperatively and he struck Plavix as soon as possible postoperatively  · most recent echo on 06/23/2021 showed LVEF of 20%  · Patient was prescribed a LifeVest last admission, currently not using, states he sent it back because it was loose fitting    · Reviewed [place] records, Cardiac catheterization 6/9/21: diffuse moderate prox and mid disease/ISR, severe OM lesion, total mid LAD (likely infarcted territory given AK and aneurysmal)    Anemia  Assessment & Plan  Hemoglobin stable  Iron supplement    S/P AVR  Assessment & Plan  Status post TAVR in [place] on 21    Chronic combined systolic and diastolic CHF (congestive heart failure) ([facility])  Assessment & Plan  Wt Readings from Last 3 Encounters:   21 95 6 kg (210 lb 12 2 oz)   21 97 3 kg (214 lb 8 oz)   21 98 8 kg (217 lb 12 8 oz)     · TTE 2021: LVEF 20%  · Patient is currently euvolemic  · Daily weight, intake and output  · Continue metoprolol, hold home lasix for OR  · euvolemic  · Restart soon          [facility] Progress Note  Patient: Elizabet Rene 76 y o  male   MRN: [id number]  PCP: Gume Rodríguez MD  Unit/Bed#: OR [place] Encounter: [id number]  Date Of Visit: 21    Assessment:    Principal Problem:    Ischemic leg pain  Active Problems:    Chronic combined systolic and diastolic CHF (congestive heart failure) (HCC)    S/P AVR    Anemia    Ischemic cardiomyopathy    Prostate cancer ([facility] [address] )      Plan:         VTE Pharmacologic Prophylaxis:     Moderate Risk (Score 3-4) - Pharmacological DVT Prophylaxis Ordered: Heparin Drip  Mechanical VTE Prophylaxis in Place: Yes    Patient Centered Rounds: I have performed bedside rounds with nursing staff today  Discussions with Specialists or Other Care Team Provider:     Education and Discussions with Family / Patient: patient    Time Spent for Care: 30 minutes  More than 50% of total time spent on counseling and coordination of care as described above  Current Length of Stay: 2 day(s)  Current Patient Status: Inpatient     Certification Statement: The patient will continue to require additional inpatient hospital stay due to monitor post op    Discharge Plan / Estimated Discharge Date: Anticipate discharge in 48 hrs to home      Code Status: Level 1 - Full Code      Subjective:   Saw him post op, doing well, no CP or SOB, currently no leg pain    Objective:     Vitals:   Temp (24hrs), Av °F (36 7 °C), Min:97 9 °F (36 6 °C), Max:98 °F (36 7 °C)    Temp:  [97 9 °F (36 6 °C)-98 °F (36 7 °C)] 97 9 °F (36 6 °C)  HR: [62-89] 70  Resp:  [14-22] 20  BP: ()/(51-67) 101/51  SpO2:  [93 %-99 %] 98 %  Body mass index is 33 01 kg/m²  Input and Output Summary (last 24 hours): Intake/Output Summary (Last 24 hours) at 7/9/2021 1923  Last data filed at 7/9/2021 1723  Gross per 24 hour   Intake 1000 ml   Output 900 ml   Net 100 ml       Physical Exam:   Physical Exam  Vitals reviewed  HENT:      Head: Normocephalic and atraumatic  Mouth/Throat:      Mouth: Mucous membranes are moist    Cardiovascular:      Rate and Rhythm: Normal rate and regular rhythm  Heart sounds: Normal heart sounds  Pulmonary:      Effort: Pulmonary effort is normal  No respiratory distress  Breath sounds: Normal breath sounds  Abdominal:      General: There is no distension  Palpations: Abdomen is soft  Tenderness: There is no abdominal tenderness  Neurological:      Mental Status: He is alert and oriented to person, place, and time  Additional Data:     Labs:  Results from last 7 days   Lab Units 07/09/21  0438 07/07/21  1050   WBC Thousand/uL 4 91 6 39   HEMOGLOBIN g/dL 10 5* 11 2*   HEMATOCRIT % 33 9* 35 1*   PLATELETS Thousands/uL 203 231   NEUTROS PCT %  --  66   LYMPHS PCT %  --  19   MONOS PCT %  --  11   EOS PCT %  --  2     Results from last 7 days   Lab Units 07/09/21  0511 07/07/21  1050   SODIUM mmol/L 136 129*   POTASSIUM mmol/L 4 1 4 4   CHLORIDE mmol/L 105 100   CO2 mmol/L 21 19*   BUN mg/dL 18 29*   CREATININE mg/dL 1 03 1 28   ANION GAP mmol/L 10 10   CALCIUM mg/dL 9 3 9 4   ALBUMIN g/dL  --  3 7   TOTAL BILIRUBIN mg/dL  --  1 20*   ALK PHOS U/L  --  60   ALT U/L  --  31   AST U/L  --  21   GLUCOSE RANDOM mg/dL 92 97     Results from last 7 days   Lab Units 07/08/21  0405   INR  1 11                   Imaging: No pertinent imaging reviewed      Recent Cultures (last 7 days):           Lines/Drains:  Invasive Devices     Peripheral Intravenous Line            Peripheral IV 07/07/21 Right Antecubital 2 days    Peripheral IV 07/07/21 Right Forearm 2 days          Arterial Line            Arterial Line 07/09/21 Left Radial <1 day          Drain            Urethral Catheter Latex 16 Fr  <1 day                Telemetry:        Last 24 Hours Medication List:   Current Facility-Administered Medications   Medication Dose Route Frequency Provider Last Rate    acetaminophen  650 mg Oral Q6H PRN Shonda Aristeo, DO      atorvastatin  80 mg Oral Daily With Garo Loud, DO      clopidogrel  75 mg Oral Daily Shonda Aristeo, DO      docusate sodium  100 mg Oral BID Shonda Aristeo, DO      fentaNYL  25 mcg Intravenous Q3 min PRN Clerance Coppersmith, CRNA      ferrous sulfate  325 mg Oral BID With Meals Shonda Aristeo, DO      heparin (porcine)  5,000 Units Subcutaneous [facility] Hsonda Aristeo, DO      HYDROmorphone  0 5 mg Intravenous Q4H PRN Shonda Aristeo, DO      lactated ringers  500 mL Intravenous Once PRN Shonda Aristeo, DO      And    lactated ringers  500 mL Intravenous Once PRN Shonda Aristeo, DO      metoprolol succinate  12 5 mg Oral Daily Shonda Aristeo, DO      morphine injection  2 mg Intravenous Q4H PRN Shonda Aristeo, DO      ondansetron  4 mg Intravenous Q6H PRN Shonda Aristeo, DO      ondansetron  4 mg Intravenous Once PRN Clerance Coppersmith, CRNA      oxyCODONE  5 mg Oral Q4H PRN Shonda Aristeo, DO      pantoprazole  40 mg Oral Early Morning Shonda Aristeo, DO      sodium chloride  500 mL Intravenous Once PRN Shonda Aristeo, DO      And    sodium chloride  500 mL Intravenous Once PRN Shonda Aristeo, DO      sodium chloride  50 mL/hr Intravenous Continuous Kallie Ferrell MD      sodium chloride  75 mL/hr Intravenous Continuous Kallie Ferrell MD 75 mL/hr (07/09/21 1740)    tamsulosin  0 4 mg Oral Daily With Garo Loud, DO          Today, Patient Was Seen By: Shirley Ewing MD    ** Please Note: This note has been constructed using a voice recognition system   **

## 2021-07-09 NOTE — ASSESSMENT & PLAN NOTE
Wt Readings from Last 3 Encounters:   07/09/21 95 6 kg (210 lb 12 2 oz)   07/06/21 97 3 kg (214 lb 8 oz)   07/01/21 98 8 kg (217 lb 12 8 oz)     · TTE 06/23/2021: LVEF 20%  · Patient is currently euvolemic  · Daily weight, intake and output  · Continue metoprolol, hold home lasix for OR  · euvolemic  · Restart soon

## 2021-07-09 NOTE — PROGRESS NOTES
Progress Note - Vascular Surgery   Jermaine Rene 76 y o  male MRN: [id number]  Unit/Bed#: [facility] 823-01 Encounter: [id number]    Assessment:  70-year-old male with past medical history of CHF, anemia, cardiomyopathy s/p TAVR on 6/14/21  Now presenting with worsening RLE rest pain of one month duration, loss of sensation to the right foot, and right foot weakness       RLE - Palp Fem, Doppler AT/Peroneal  LLE -Palp Fem, Doppler DP/PT/Peroneal    Plan:  Plan for R CFEA today 7/9  Continue heparin drip  Continue plavix/statin  Cardiology clearance obtained, no further work up, ok to proceed  Rest of care per primary    Subjective/Objective     Subjective: No acute events overnight, patient states his pain has improved since he arrived, he does have some tingling in the dorsal aspect of his right foot reports that he understands the plan for surgery today    Objective:    Blood pressure 116/67, pulse 73, temperature 98 °F (36 7 °C), resp  rate 18, height 5' 7" (1 702 m), weight 97 5 kg (215 lb), SpO2 96 %  ,Body mass index is 33 67 kg/m²        Intake/Output Summary (Last 24 hours) at 7/9/2021 0559  Last data filed at 7/9/2021 8205  Gross per 24 hour   Intake 220 ml   Output 900 ml   Net -680 ml       Invasive Devices     Peripheral Intravenous Line            Peripheral IV 07/07/21 Right Antecubital 1 day    Peripheral IV 07/07/21 Right Forearm 1 day                Physical Exam:  Gen:    NAD  CV:      warm, well-perfused  Lungs: No respiratory distress  Abd:     soft, NT/ND  Ext:      R - Palp Fem, Doppler AT/Peroneal, L -Palp Fem, Doppler DP/PT/Peroneal, decreased motor and sensation in right foot but seems to be somewhat chronic  Neuro: A&Ox3

## 2021-07-09 NOTE — QUICK NOTE
Postop check    Patient is doing well resting comfortably in bed at this time  He does have some discomfort in the groin however there is no sign of hematoma or ecchymosis      His motor and sensory function is baseline however the foot is much warmer than preoperatively    Doppler signal consistent with immediate postop evaluation    AT/DP Doppler signal is marked on right lower extremity    Right groin with no hematoma or ecchymosis clean dry and intact incision

## 2021-07-09 NOTE — ASSESSMENT & PLAN NOTE
· S/P  Chemotherapy and radiation in [place]  · Patient was receiving chemo every 4 weeks in [place], last chemo 4 weeks ago  · Patient wishes to establish care with Oncology at [facility]

## 2021-07-09 NOTE — ANESTHESIA PROCEDURE NOTES
Arterial Line Insertion  Performed by: Jonnie Boswell CRNA  Authorized by: Jonnie Boswell CRNA   Consent: Verbal consent obtained  Risks and benefits: risks, benefits and alternatives were discussed  Consent given by: patient  Patient understanding: patient states understanding of the procedure being performed  Patient consent: the patient's understanding of the procedure matches consent given  Procedure consent: procedure consent matches procedure scheduled  Relevant documents: relevant documents present and verified  Test results: test results available and properly labeled  Site marked: the operative site was marked  Required items: required blood products, implants, devices, and special equipment available  Patient identity confirmed: verbally with patient, arm band and provided demographic data  Time out: Immediately prior to procedure a "time out" was called to verify the correct patient, procedure, equipment, support staff and site/side marked as required  Preparation: Patient was prepped and draped in the usual sterile fashion  Indications: hemodynamic monitoring  Orientation:  Left  Location: radial arterylidocaine (PF) (XYLOCAINE-MPF) 1 % infiltration, 1 mL  Sedation:  Patient sedated: yes  Analgesia: see MAR for details  Vitals: Vital signs were monitored during sedation      Procedure Details:  Kenney's test normal: yes  Needle gauge: 20  Number of attempts: 1    Post-procedure:  Post-procedure: dressing applied  Waveform: good waveform  Post-procedure CNS: normal  Patient tolerance: Patient tolerated the procedure well with no immediate complications and patient tolerated the procedure well with no immediate complications

## 2021-07-09 NOTE — TELEPHONE ENCOUNTER
Patient has ov with [facility] 7/12, but is currently in house   Canceled appointment will call back to reschedule once discharged

## 2021-07-09 NOTE — ASSESSMENT & PLAN NOTE
· Patient with history of ischemic cardiomyopathy, chronic CHF, presented with right lower leg pain  · LEAD reveals >75% stenosis of the R CFA/SFA  · Vascular surgery consult appreciated  · CTA runoff: High-grade distal right common femoral artery, proximal profunda femoris artery, and multifocal superficial femoral artery stenoses with probable three-vessel runoff to the foot   Distal runoff is limited due to dense calcifications  · S/p endarterectomy today 07/09  · psot op AC per vascular

## 2021-07-09 NOTE — PROGRESS NOTES
Heart Failure/ Pulmonary Hypertension Progress Note - Isabel Rene 76 y o  male MRN: [id number]    Unit/Bed#: [facility] 823-01 Encounter: [id number]      Assessment:    Principal Problem:    Ischemic leg pain  Active Problems:    Chronic combined systolic and diastolic CHF (congestive heart failure) (HCC)    S/P AVR    Anemia    Ischemic cardiomyopathy    Prostate cancer (HCC)      Subjective:   Patient seen and examined  No significant events overnight  Plan  right common femoral endarterectomy today  Labs/hemodynamics stable        Objective: Intake/ Output: 220/1000/-780  Weight: 210 lbs  Tele: not on tele  Preoperative risk assessment  --Based on the Revised Cardiac Risk Index Leydi Neno Criteria), patient's estimated risk of adverse outcome with non-cardiac surgery is considered "high"  His estimated rate of MI, pulmonary edema, VF, Cardiac arrest, or CHB is >11%  --Patient's functional capacity is : likely <3 METS  --Advise judicious administration of IVF and close monitoring of fluid status given patient;'s systolic CHF and risk of volume overload  --Advise cardiac anesthesia  --12 lead EKG from 6/22/21 demonstrates sinus rhythm with LBBB       RLE ischemia  LEAD with >75% stenosis of the CFA/SFA  For CTA with runoff  Management per vascular  Plan for Meghann Carvajal 142 today  Remains On heparin gtt       Newly diagnosed HFrEF; LVEF 20%; LVIDd 5 9 cm; NYHA III; ACC/AHA Stage C  Etiology: ischemic +/- progression of valvular disease +/- chemotherapy (on abiraterone and leuprolide with unclear safety profile)  Examines WWP  Euvolemic  MAP stable  Will continue Metoprolol only for now until post op period  Will need to re-evaluate remainder of medical regimen/dosing  prior to discharge  BMP now stabilized     TTE 06/14/2021 (at [facility] pre- and post-TAVR, per report): LVEF 20%   LVIDd 5 4 cm  "entire apex, mid and apical anterior septum, mid and apical inferior septum, mid and apical inferior wall, mid inferolateral wall, and mid lateral wall are akinetic " Trace MR and TR  Pre-TAVR: LVOT VTI 12 0 cm  JOSE 0 47 cm^2  Post: bioprosthetic AV valve present; JOSE 2 41 cm^2                  TTE 06/23/2021: LVEF 20%  LVIDd 5 9 cm  Grade 2 DD  "dyskinesis and aneurysmal deformity of the apical anterior, mid anteroseptal, apical inferior, apical septal, and apical wall(s) " Normal RV size and RVSF  Mild MR  AV bioprosthesis with normal leaflet separation  Trace TR      Neurohormonal Blockade:  --Beta-Blocker: Metoprolol succinate 12 5 mg BID  --ACEi, ARB or ARNi: Entresto 24/26 mg BID -on hold    (or SVR reduction)  --Aldosterone Receptor Blocker: Spironolactone 12 5 mg daily - on hold  --SGLT2-I:   --Diuretic: Lasix 40 mg daily-on hold    Sudden Cardiac Death Risk Reduction:  --ICD: EF 20%  Has LIfeVEst    Interrogation:     Electrical Resynchronization:  -- LBBB  Interrogation:     Advanced Therapies (If appropriate): --Inotrope:  --LVAD/Transplant Candidacy:    Prostate cancer with bony metastases              [facility]               Taking SQ leuprolide (Eligard) and abiraterone (Zytiga) - safety of abiraterone in patients with LVEF <50% not established per FDA  [place]-analysis of drug noted increased incidence and relative risk of CV toxicity      Coronary artery disease              Without active chest pain  S/pstenting to circumflex and RCA in 2001  [facility] 06/09/2021 (per documentation):"diffuse moderate prox and mid disease/ISR, severe OM lesion and total mid LAD  "              Continue on Plavix, statin, and BB as above      Aortic stenosis      S/p TAVR (Evolut Pro+ 29mm) on 06/14/2021 at [facility]        Continues on Plavix       Hyperlipidemia  No recent FLP  Continue on atorvastatin 80 mg daily       History of atrial tachycardia: s/p ablation at OSH in 2018  Benign prostatic hyperplasia       Review of Systems   All other systems reviewed and are negative         [facility] (day, reason): Moser catheter (day, reason):    Vitals: Blood pressure 112/66, pulse 89, temperature 98 °F (36 7 °C), temperature source Oral, resp  rate 16, height 5' 7" (1 702 m), weight 95 6 kg (210 lb 12 2 oz), SpO2 96 %  , Body mass index is 33 01 kg/m² , I/O last 3 completed shifts: In: 220 [P O :220]  Out: 1950 [Urine:1950]  No intake/output data recorded  Wt Readings from Last 3 Encounters:   07/09/21 95 6 kg (210 lb 12 2 oz)   07/06/21 97 3 kg (214 lb 8 oz)   07/01/21 98 8 kg (217 lb 12 8 oz)       Intake/Output Summary (Last 24 hours) at 7/9/2021 1017  Last data filed at 7/9/2021 0624  Gross per 24 hour   Intake 220 ml   Output 1000 ml   Net -780 ml     I/O last 3 completed shifts: In: 220 [P O :220]  Out: 1950 [Urine:1950]    Not on tele         Physical Exam:  Vitals:    07/08/21 1609 07/08/21 2230 07/09/21 0600 07/09/21 0808   BP: 116/63 116/67  112/66   BP Location:    Left arm   Pulse: 73   89   Resp: 20 18  16   Temp: 97 9 °F (36 6 °C) 98 °F (36 7 °C)  98 °F (36 7 °C)   TempSrc:    Oral   SpO2: 96%   96%   Weight:   95 6 kg (210 lb 12 2 oz)    Height:           GEN: Royce L Free appears well, alert and oriented x 3, pleasant and cooperative   HEENT: pupils equal, round, and reactive to light; extraocular muscles intact  NECK: supple, no carotid bruits   HEART: regular rhythm, normal S1 and S2, no murmurs, clicks, gallops or rubs, JVP is flat   LUNGS: clear to auscultation bilaterally; no wheezes, rales, or rhonchi   ABDOMEN: normal bowel sounds, soft, no tenderness, no distention  EXTREMITIES: Unable to palpate pedal pulses, right LE cool, LLE warm to the touch,; no clubbing, cyanosis, or edema  NEURO: no focal findings   SKIN: normal without suspicious lesions on exposed skin      Current Facility-Administered Medications:     acetaminophen (TYLENOL) tablet 650 mg, 650 mg, Oral, Q6H PRN, Alondra Snyder MD    atorvastatin (LIPITOR) tablet 80 mg, 80 mg, Oral, Daily With Jenny Hairston MD, 80 mg at 07/08/21 1621    clopidogrel (PLAVIX) tablet 75 mg, 75 mg, Oral, Daily, Livier Garcia MD, 75 mg at 07/09/21 0858    ferrous sulfate tablet 325 mg, 325 mg, Oral, BID With Meals, Mali Melgoza MD, 325 mg at 07/08/21 1621    heparin (porcine) 25,000 units in 0 45% NaCl 250 mL infusion (premix), 3-30 Units/kg/hr (Order-Specific), Intravenous, Titrated, Livier Garcia MD, Last Rate: 12 4 mL/hr at 07/09/21 0602, 13 Units/kg/hr at 07/09/21 0602    heparin (porcine) injection 3,800 Units, 3,800 Units, Intravenous, Q1H PRN, Mali Melgoza MD, 3,800 Units at 07/09/21 0556    heparin (porcine) injection 7,600 Units, 7,600 Units, Intravenous, Q1H PRN, Mali Melgoza MD    HYDROmorphone (DILAUDID) injection 0 5 mg, 0 5 mg, Intravenous, Q4H PRN, Mali Melgoza MD    metoprolol succinate (TOPROL-XL) 24 hr tablet 12 5 mg, 12 5 mg, Oral, Daily, OMAIRA Du, 12 5 mg at 07/09/21 0858    morphine injection 2 mg, 2 mg, Intravenous, Q4H PRN, Mali Melgoza MD, 2 mg at 07/09/21 0858    oxyCODONE (ROXICODONE) IR tablet 5 mg, 5 mg, Oral, Q4H PRN, Mali Melgoza MD, 5 mg at 07/07/21 2244    pantoprazole (PROTONIX) EC tablet 40 mg, 40 mg, Oral, Early Morning, Livier Garcia MD, 40 mg at 07/09/21 0557    sodium chloride 0 9 % infusion, 50 mL/hr, Intravenous, Continuous, Mani Oliveira PA-C, Last Rate: 50 mL/hr at 07/09/21 0556, 50 mL/hr at 07/09/21 0556    tamsulosin (FLOMAX) capsule 0 4 mg, 0 4 mg, Oral, Daily With Mariel Nevarez MD, 0 4 mg at 07/08/21 1621      Labs & Results:        Results from last 7 days   Lab Units 07/09/21  0438 07/08/21  0405 07/07/21  1305   WBC Thousand/uL 4 91 5 05 5 29   HEMOGLOBIN g/dL 10 5* 10 6* 11 1*   HEMATOCRIT % 33 9* 34 4* 35 5*   PLATELETS Thousands/uL 203 211 236         Results from last 7 days   Lab Units 07/09/21  0511 07/08/21  0405 07/07/21  1050   POTASSIUM mmol/L 4 1 4 1 4 4   CHLORIDE mmol/L 105 105 100   CO2 mmol/L 21 22 19*   BUN mg/dL 18 20 29*   CREATININE mg/dL 1 03 0  98 1 28   CALCIUM mg/dL 9 3 9 6 9 4   ALK PHOS U/L  --   --  60   ALT U/L  --   --  31   AST U/L  --   --  21     Results from last 7 days   Lab Units 07/08/21  0405 07/07/21  1305 07/07/21  1050   INR  1 11 1 05 1 04         Counseling / Coordination of Care  Total floor / unit time spent today 20 minutes  Greater than 50% of total time was spent with the patient and / or family counseling and / or coordination of care  A description of the counseling / coordination of care: 20  Thank you for the opportunity to participate in the care of this patient  Katelynn Penn

## 2021-07-09 NOTE — ANESTHESIA PREPROCEDURE EVALUATION
Procedure:  ENDARTERECTOMY ARTERIAL FEMORAL (Right Leg Lower)    Relevant Problems   CARDIO   (+) S/P AVR      /RENAL   (+) Prostate cancer (HCC)      HEMATOLOGY   (+) Anemia      MUSCULOSKELETAL   (+) Chronic midline low back pain        Physical Exam    Airway    Mallampati score: II  TM Distance: >3 FB  Neck ROM: full     Dental       Cardiovascular      Pulmonary      Other Findings        Anesthesia Plan  ASA Score- 4     Anesthesia Type- general with ASA Monitors  Additional Monitors: arterial line  Airway Plan: ETT  Plan Factors-        Patient is not a current smoker  Patient did not smoke on day of surgery  Induction- intravenous  Postoperative Plan- Plan for postoperative opioid use  Planned trial extubation    Informed Consent- Anesthetic plan and risks discussed with patient  I personally reviewed this patient with the CRNA  Discussed and agreed on the Anesthesia Plan with the CRNA  Elvi Fink

## 2021-07-09 NOTE — PROGRESS NOTES
PTT drawn at 1200 - patient taken to OR at 1215  No result for PTT at this time  Will speak with provider and adjust heparin gtt accordingly when pt returns from OR

## 2021-07-10 PROBLEM — R50.9 FEVER: Status: ACTIVE | Noted: 2021-07-10

## 2021-07-10 PROBLEM — I25.10 CAD (CORONARY ARTERY DISEASE): Status: ACTIVE | Noted: 2021-07-10

## 2021-07-10 PROBLEM — I95.9 HYPOTENSION: Status: ACTIVE | Noted: 2021-07-10

## 2021-07-10 LAB
ANION GAP SERPL CALCULATED.3IONS-SCNC: 7 MMOL/L (ref 4–13)
BUN SERPL-MCNC: 13 MG/DL (ref 5–25)
CALCIUM SERPL-MCNC: 9 MG/DL (ref 8.3–10.1)
CHLORIDE SERPL-SCNC: 111 MMOL/L (ref 100–108)
CO2 SERPL-SCNC: 20 MMOL/L (ref 21–32)
CREAT SERPL-MCNC: 1.03 MG/DL (ref 0.6–1.3)
ERYTHROCYTE [DISTWIDTH] IN BLOOD BY AUTOMATED COUNT: 16.6 % (ref 11.6–15.1)
GFR SERPL CREATININE-BSD FRML MDRD: 71 ML/MIN/1.73SQ M
GLUCOSE SERPL-MCNC: 99 MG/DL (ref 65–140)
HCT VFR BLD AUTO: 32.2 % (ref 36.5–49.3)
HCT VFR BLD AUTO: 32.8 % (ref 36.5–49.3)
HGB BLD-MCNC: 9.7 G/DL (ref 12–17)
HGB BLD-MCNC: 9.9 G/DL (ref 12–17)
MCH RBC QN AUTO: 25.1 PG (ref 26.8–34.3)
MCHC RBC AUTO-ENTMCNC: 30.1 G/DL (ref 31.4–37.4)
MCV RBC AUTO: 83 FL (ref 82–98)
PLATELET # BLD AUTO: 194 THOUSANDS/UL (ref 149–390)
PLATELET # BLD AUTO: 206 THOUSANDS/UL (ref 149–390)
PMV BLD AUTO: 10 FL (ref 8.9–12.7)
PMV BLD AUTO: 10.4 FL (ref 8.9–12.7)
POTASSIUM SERPL-SCNC: 4.3 MMOL/L (ref 3.5–5.3)
RBC # BLD AUTO: 3.87 MILLION/UL (ref 3.88–5.62)
SODIUM SERPL-SCNC: 138 MMOL/L (ref 136–145)
WBC # BLD AUTO: 7.87 THOUSAND/UL (ref 4.31–10.16)

## 2021-07-10 PROCEDURE — 85049 AUTOMATED PLATELET COUNT: CPT | Performed by: SURGERY

## 2021-07-10 PROCEDURE — 85014 HEMATOCRIT: CPT | Performed by: STUDENT IN AN ORGANIZED HEALTH CARE EDUCATION/TRAINING PROGRAM

## 2021-07-10 PROCEDURE — 99232 SBSQ HOSP IP/OBS MODERATE 35: CPT | Performed by: INTERNAL MEDICINE

## 2021-07-10 PROCEDURE — 99024 POSTOP FOLLOW-UP VISIT: CPT | Performed by: PHYSICIAN ASSISTANT

## 2021-07-10 PROCEDURE — 85018 HEMOGLOBIN: CPT | Performed by: STUDENT IN AN ORGANIZED HEALTH CARE EDUCATION/TRAINING PROGRAM

## 2021-07-10 PROCEDURE — 85027 COMPLETE CBC AUTOMATED: CPT | Performed by: SURGERY

## 2021-07-10 PROCEDURE — 80048 BASIC METABOLIC PNL TOTAL CA: CPT | Performed by: SURGERY

## 2021-07-10 RX ORDER — OXYCODONE HYDROCHLORIDE 10 MG/1
10 TABLET ORAL EVERY 4 HOURS PRN
Status: DISCONTINUED | OUTPATIENT
Start: 2021-07-10 | End: 2021-07-12

## 2021-07-10 RX ORDER — LIDOCAINE 50 MG/G
2 PATCH TOPICAL DAILY
Status: DISCONTINUED | OUTPATIENT
Start: 2021-07-10 | End: 2021-07-10

## 2021-07-10 RX ORDER — ALBUMIN (HUMAN) 12.5 G/50ML
25 SOLUTION INTRAVENOUS ONCE
Status: COMPLETED | OUTPATIENT
Start: 2021-07-10 | End: 2021-07-11

## 2021-07-10 RX ORDER — FUROSEMIDE 40 MG/1
40 TABLET ORAL DAILY
Status: DISCONTINUED | OUTPATIENT
Start: 2021-07-10 | End: 2021-07-12

## 2021-07-10 RX ORDER — ASPIRIN 81 MG/1
81 TABLET, CHEWABLE ORAL DAILY
Status: DISCONTINUED | OUTPATIENT
Start: 2021-07-10 | End: 2021-07-13 | Stop reason: HOSPADM

## 2021-07-10 RX ORDER — LIDOCAINE 50 MG/G
2 PATCH TOPICAL DAILY
Status: DISCONTINUED | OUTPATIENT
Start: 2021-07-10 | End: 2021-07-13 | Stop reason: HOSPADM

## 2021-07-10 RX ADMIN — OXYCODONE HYDROCHLORIDE 10 MG: 10 TABLET ORAL at 09:51

## 2021-07-10 RX ADMIN — LIDOCAINE 2 PATCH: 50 PATCH TOPICAL at 03:55

## 2021-07-10 RX ADMIN — ATORVASTATIN CALCIUM 80 MG: 80 TABLET, FILM COATED ORAL at 16:44

## 2021-07-10 RX ADMIN — FERROUS SULFATE TAB 325 MG (65 MG ELEMENTAL FE) 325 MG: 325 (65 FE) TAB at 08:16

## 2021-07-10 RX ADMIN — OXYCODONE HYDROCHLORIDE 5 MG: 5 TABLET ORAL at 16:51

## 2021-07-10 RX ADMIN — HEPARIN SODIUM 5000 UNITS: 5000 INJECTION INTRAVENOUS; SUBCUTANEOUS at 05:28

## 2021-07-10 RX ADMIN — HEPARIN SODIUM 5000 UNITS: 5000 INJECTION INTRAVENOUS; SUBCUTANEOUS at 13:24

## 2021-07-10 RX ADMIN — ALBUMIN (HUMAN) 25 G: 0.25 INJECTION, SOLUTION INTRAVENOUS at 19:53

## 2021-07-10 RX ADMIN — OXYCODONE HYDROCHLORIDE 5 MG: 5 TABLET ORAL at 05:28

## 2021-07-10 RX ADMIN — SODIUM CHLORIDE, SODIUM LACTATE, POTASSIUM CHLORIDE, AND CALCIUM CHLORIDE 500 ML: .6; .31; .03; .02 INJECTION, SOLUTION INTRAVENOUS at 15:04

## 2021-07-10 RX ADMIN — MORPHINE SULFATE 2 MG: 2 INJECTION, SOLUTION INTRAMUSCULAR; INTRAVENOUS at 03:17

## 2021-07-10 RX ADMIN — DOCUSATE SODIUM 100 MG: 100 CAPSULE ORAL at 08:16

## 2021-07-10 RX ADMIN — PANTOPRAZOLE SODIUM 40 MG: 40 TABLET, DELAYED RELEASE ORAL at 05:28

## 2021-07-10 RX ADMIN — ASPIRIN 81 MG CHEWABLE TABLET 81 MG: 81 TABLET CHEWABLE at 09:51

## 2021-07-10 RX ADMIN — FERROUS SULFATE TAB 325 MG (65 MG ELEMENTAL FE) 325 MG: 325 (65 FE) TAB at 16:44

## 2021-07-10 RX ADMIN — HYDROMORPHONE HYDROCHLORIDE 0.5 MG: 1 INJECTION, SOLUTION INTRAMUSCULAR; INTRAVENOUS; SUBCUTANEOUS at 04:14

## 2021-07-10 RX ADMIN — DOCUSATE SODIUM 100 MG: 100 CAPSULE ORAL at 16:44

## 2021-07-10 RX ADMIN — SACUBITRIL AND VALSARTAN 1 TABLET: 24; 26 TABLET, FILM COATED ORAL at 11:00

## 2021-07-10 RX ADMIN — FUROSEMIDE 40 MG: 40 TABLET ORAL at 09:51

## 2021-07-10 RX ADMIN — HEPARIN SODIUM 5000 UNITS: 5000 INJECTION INTRAVENOUS; SUBCUTANEOUS at 21:24

## 2021-07-10 RX ADMIN — HYDROMORPHONE HYDROCHLORIDE 0.5 MG: 1 INJECTION, SOLUTION INTRAMUSCULAR; INTRAVENOUS; SUBCUTANEOUS at 13:24

## 2021-07-10 RX ADMIN — CLOPIDOGREL BISULFATE 75 MG: 75 TABLET ORAL at 08:16

## 2021-07-10 RX ADMIN — METOPROLOL SUCCINATE 12.5 MG: 25 TABLET, FILM COATED, EXTENDED RELEASE ORAL at 08:16

## 2021-07-10 RX ADMIN — TAMSULOSIN HYDROCHLORIDE 0.4 MG: 0.4 CAPSULE ORAL at 16:44

## 2021-07-10 NOTE — ASSESSMENT & PLAN NOTE
S/p PCI to circumflex/RCA 2001  Most recent [facility] 6/2021 revealing diffuse moderate prox and mid disease/ISR, severe OM lesion and total mid LAD  On asa/plavix/statin/bb  Pt is cp free

## 2021-07-10 NOTE — ASSESSMENT & PLAN NOTE
Chronically runs low as confirmed per cardiology likely due to low ef HF  Cont to monitor   Bp holding parameters placed on cardiac meds

## 2021-07-10 NOTE — PROGRESS NOTES
Progress Note - Vascular Surgery   Ivory Cons Free 76 y o  male MRN: [id number]  Unit/Bed#: [facility] 511-01 Encounter: [id number]    Assessment:  76 y o  male with past medical history of CHF, anemia, cardiomyopathy s/p TAVR on 6/14/21  Now presenting with worsening RLE rest pain of one month duration, loss of sensation to the right foot, and right foot weakness  1 Day Post-Op s/p Procedure(s) (LRB):  ENDARTERECTOMY ARTERIAL FEMORAL (Right)         RLE - Palp Fem, Doppler AT/DP      Plan:  Diet as tolerated  Neurovascular Checks  Continue ASA/Statin/Plavix  Heparin gtt off  PT/OT  Rest of care per primary    Subjective/Objective     Subjective: No acute events ovenright  Resting in bed comfortably without complaints    Objective:    Blood pressure 107/59, pulse 97, temperature 99 7 °F (37 6 °C), resp  rate 18, height 5' 7" (1 702 m), weight 96 4 kg (212 lb 8 4 oz), SpO2 98 %  ,Body mass index is 33 29 kg/m²        Intake/Output Summary (Last 24 hours) at 7/10/2021 0830  Last data filed at 7/10/2021 0601  Gross per 24 hour   Intake 1698 75 ml   Output 1150 ml   Net 548 75 ml       Invasive Devices     Peripheral Intravenous Line            Peripheral IV 07/07/21 Right Antecubital 2 days    Peripheral IV 07/07/21 Right Forearm 2 days          Arterial Line            Arterial Line 07/09/21 Left Radial <1 day                Physical Exam:  Gen:    NAD  CV:      warm, well-perfused  Lungs: No respiratory distress  Abd:     soft, NT/ND  Ext:      RLE groin incision c/d/i  RLE with more motor function this AM  RLE with doppler AT/DP  Neuro: A&Ox3

## 2021-07-10 NOTE — ASSESSMENT & PLAN NOTE
X 1 episode likely post op related  Cont to monitor fever curve  No leukocytosis, non toxic appearing  Check procalcitonin

## 2021-07-10 NOTE — ASSESSMENT & PLAN NOTE
Wt Readings from Last 3 Encounters:   07/10/21 96 4 kg (212 lb 8 4 oz)   07/06/21 97 3 kg (214 lb 8 oz)   07/01/21 98 8 kg (217 lb 12 8 oz)     · TTE 06/23/2021: LVEF 20%; underlying ICM  · Cardiology following  · Patient is currently euvolemic  · Resumed back on lasix, cont metoprolol  Initiated on low dose entresto per cardiology   Holding aldactone  · Cont to monitor fluid status  · Is supposed to be on lifevest but pt is non compliant, apparently he sent it back because if was loose fitting

## 2021-07-10 NOTE — PROGRESS NOTES
Progress Note - Cardiology   Janeth Rene 76 y o  male MRN: [id number]  Unit/Bed#: [facility] 511-01 Encounter: [id number]    Assessment:  Principal Problem:    Ischemic leg pain  Active Problems:    Chronic combined systolic and diastolic CHF (congestive heart failure) (HCC)    S/P AVR    Anemia    Ischemic cardiomyopathy    Prostate cancer (HCC)    Combined CHF, EF 20%  Ischemic cardiomyopathy  Status post TAVR recently  Recent admission for heart failure  Had presented to the office for regular follow-up but had been hypotensive at that time, unclear what medication regimen was seen like he may have been taking additional medications  Was referred to the emergency room, but he declined  He ultimately presented the following day because of ongoing right lower extremity pain  He is now status post femoral endarterectomy on the right  Plan:    Blood pressure is relatively stable  He does chronically run hypotensive  He is on low-dose of a beta-blocker  I will add low-dose Entresto and also resume his oral diuretic Lasix 40 mg daily  Hold parameter will be less than 822 mm Hg systolic  Will not add his Aldactone back at this time  Subjective/Objective     Subjective:  Patient denies specific complaints  He denies dizziness, lightheadedness yet   No chest pain or shortness of breath  His heart failure regimen has mostly been held since his admission because of some relative hypotension and given that he was going for surgery      Objective:    Vitals: /59   Pulse 97   Temp 99 7 °F (37 6 °C)   Resp 18   Ht 5' 7" (1 702 m)   Wt 96 4 kg (212 lb 8 4 oz)   SpO2 98%   BMI 33 29 kg/m²   Vitals:    07/09/21 0600 07/10/21 0548   Weight: 95 6 kg (210 lb 12 2 oz) 96 4 kg (212 lb 8 4 oz)     Orthostatic Blood Pressures      Most Recent Value   Blood Pressure  107/59 filed at 07/10/2021 1858   Patient Position - Orthostatic VS  Lying filed at 07/10/2021 0317            Intake/Output Summary (Last 24 hours) at 7/10/2021 0912  Last data filed at 7/10/2021 0601  Gross per 24 hour   Intake 1698 75 ml   Output 1150 ml   Net 548 75 ml     Physical Exam:   General appearance: pale, chronically ill appearing  Head: Normocephalic, without obvious abnormality, atraumatic  Neck: no carotid bruit, no JVD and supple, symmetrical, trachea midline  Lungs: clear to auscultation bilaterally  Normal air entry  Normal effort  Heart: S1, S2 normal and no S3 or S4  No murmurs  Abdomen: Obese, soft, non-tender; bowel sounds normal; no masses,  no organomegaly  Skin: Skin color, texture, turgor normal  No rashes or lesions  Neurologic: Grossly normal  Alert and oriented      Medications:    Current Facility-Administered Medications:     acetaminophen (TYLENOL) tablet 650 mg, 650 mg, Oral, Q6H PRN, Micaela Sommer MD    aspirin chewable tablet 81 mg, 81 mg, Oral, Daily, Scottie Spangler DPM    atorvastatin (LIPITOR) tablet 80 mg, 80 mg, Oral, Daily With Shad Burton MD, 80 mg at 07/08/21 1621    clopidogrel (PLAVIX) tablet 75 mg, 75 mg, Oral, Daily, Micaela Sommer MD, 75 mg at 07/10/21 0816    docusate sodium (COLACE) capsule 100 mg, 100 mg, Oral, BID, Micaela Sommer MD, 100 mg at 07/10/21 0120    ferrous sulfate tablet 325 mg, 325 mg, Oral, BID With Meals, Micaela Sommer MD, 325 mg at 07/10/21 0816    furosemide (LASIX) tablet 40 mg, 40 mg, Oral, Daily, Mabel Falcon MD    heparin (porcine) subcutaneous injection 5,000 Units, 5,000 Units, Subcutaneous, Q8H [facility], 5,000 Units at 07/10/21 0528 **AND** Platelet count, , , Once, Micaela Sommer MD    HYDROmorphone (DILAUDID) injection 0 5 mg, 0 5 mg, Intravenous, Q4H PRN, Micaela Sommer MD, 0 5 mg at 07/10/21 0414    lactated ringers bolus 500 mL, 500 mL, Intravenous, Once PRN **AND** lactated ringers bolus 500 mL, 500 mL, Intravenous, Once PRN, John Pink, DO    lidocaine (LIDODERM) 5 % patch 2 patch, 2 patch, Topical, Daily, Isiah Mcintyre PA-C, 2 patch at 07/10/21 0355    metoprolol succinate (TOPROL-XL) 24 hr tablet 12 5 mg, 12 5 mg, Oral, Daily, Herbert Bills MD, 12 5 mg at 07/10/21 0816    ondansetron (ZOFRAN) injection 4 mg, 4 mg, Intravenous, Q6H PRN, Herbert Bills MD    oxyCODONE (ROXICODONE) immediate release tablet 10 mg, 10 mg, Oral, Q4H PRN, Isiah Mcintyre PA-C    oxyCODONE (ROXICODONE) IR tablet 5 mg, 5 mg, Oral, Q4H PRN, Herbert Bills MD, 5 mg at 07/10/21 0528    pantoprazole (PROTONIX) EC tablet 40 mg, 40 mg, Oral, Early Morning, Herbert Bills MD, 40 mg at 07/10/21 0528    sacubitril-valsartan (ENTRESTO) 24-26 MG per tablet 1 tablet, 1 tablet, Oral, BID, Daniel Blake MD    sodium chloride 0 9 % bolus 500 mL, 500 mL, Intravenous, Once PRN **AND** sodium chloride 0 9 % bolus 500 mL, 500 mL, Intravenous, Once PRN, Nelda Vicente DO    tamsulosin [facility]) capsule 0 4 mg, 0 4 mg, Oral, Daily With Raj Guerrero MD, 0 4 mg at 07/08/21 1621    Lab Results:      Results from last 7 days   Lab Units 07/10/21  0527 07/09/21  0438 07/08/21  0405   WBC Thousand/uL 7 87 4 91 5 05   HEMOGLOBIN g/dL 9 7* 10 5* 10 6*   HEMATOCRIT % 32 2* 33 9* 34 4*   PLATELETS Thousands/uL 206 203 211         Results from last 7 days   Lab Units 07/10/21  0527 07/09/21  0511 07/08/21  0405 07/07/21  1050   SODIUM mmol/L 138 136 133* 129*   POTASSIUM mmol/L 4 3 4 1 4 1 4 4   CHLORIDE mmol/L 111* 105 105 100   CO2 mmol/L 20* 21 22 19*   BUN mg/dL 13 18 20 29*   CREATININE mg/dL 1 03 1 03 0 98 1 28   CALCIUM mg/dL 9 0 9 3 9 6 9 4   ALK PHOS U/L  --   --   --  60   ALT U/L  --   --   --  31   AST U/L  --   --   --  21     Results from last 7 days   Lab Units 07/09/21  1200 07/09/21  0431 07/08/21  2030 07/08/21  0405 07/07/21  1305 07/07/21  1050   INR   --   --   --  1 11 1 05 1 04   PTT seconds 102* 57* 117* 134* 27 27           Telemetry: Personally reviewed     No significant arrhythmias    Echo:  LEFT VENTRICLE:  The ventricle was dilated  Systolic function was severely reduced  Ejection fraction was estimated to be 20 %  There was dyskinesis and aneurysmal deformity of the apical anterior, mid anteroseptal, apical inferior, apical septal, and apical wall(s)  Features were consistent with a pseudonormal left ventricular filling pattern, with concomitant abnormal relaxation and increased filling pressure (grade 2 diastolic dysfunction)  Doppler parameters were consistent with high ventricular filling pressure      MITRAL VALVE:  There was mild regurgitation      AORTIC VALVE:  A bioprosthesis was present  It exhibited normal function  Valve mean gradient was 10 mmHg

## 2021-07-10 NOTE — ASSESSMENT & PLAN NOTE
· S/P  Chemotherapy and radiation in [place]  · Patient was receiving chemo every 4 weeks in [place], last chemo 4 weeks ago  · Patient wishes to establish care with Oncology at [facility]

## 2021-07-10 NOTE — PROGRESS NOTES
[facility]  Progress Note Octaviano Mojica Free 1945, 76 y o  male MRN: [id number]  Unit/Bed#: [facility] 425-80 Encounter: [id number]  Primary Care Provider: Juan Antonio Snow MD   Date and time admitted to hospital: 7/7/2021  9:48 AM    * Ischemic leg pain  Assessment & Plan  · Patient with history of ischemic cardiomyopathy, chronic CHF, presented with right lower leg pain  · LEAD reveals >75% stenosis of the R CFA/SFA  · Vascular surgery following  · CTA runoff: High-grade distal right common femoral artery, proximal profunda femoris artery, and multifocal superficial femoral artery stenoses with probable three-vessel runoff to the foot  Distal runoff is limited due to dense calcifications  · S/p femoral endarterectomy 07/09  · On asa/plavix/statin    Fever  Assessment & Plan  X 1 episode likely post op related  Cont to monitor fever curve  No leukocytosis, non toxic appearing  Check procalcitonin      Chronic combined systolic and diastolic CHF (congestive heart failure) (HCC)  Assessment & Plan  Wt Readings from Last 3 Encounters:   07/10/21 96 4 kg (212 lb 8 4 oz)   07/06/21 97 3 kg (214 lb 8 oz)   07/01/21 98 8 kg (217 lb 12 8 oz)     · TTE 06/23/2021: LVEF 20%; underlying ICM  · Cardiology following  · Patient is currently euvolemic  · Resumed back on lasix, cont metoprolol  Initiated on low dose entresto per cardiology   Holding aldactone  · Cont to monitor fluid status  · Is supposed to be on lifevest but pt is non compliant, apparently he sent it back because if was loose fitting      Hypotension  Assessment & Plan  Chronically runs low as confirmed per cardiology likely due to low ef HF  Cont to monitor   Bp holding parameters placed on cardiac meds      CAD (coronary artery disease)  Assessment & Plan  S/p PCI to circumflex/RCA 2001  Most recent [facility] 6/2021 revealing diffuse moderate prox and mid disease/ISR, severe OM lesion and total mid LAD  On asa/plavix/statin/bb  Pt is cp free    Prostate cancer [facility])  Assessment & Plan  · S/P  Chemotherapy and radiation in [address] [place] [address]  · Patient was receiving chemo every 4 weeks in [address] [place] [address], last chemo 4 weeks ago  · Patient wishes to establish care with Oncology at [address] [facility]  H/H stable  Cont to monitor  Transfuse if < 7    S/P AVR  Assessment & Plan  AS status post TAVR in [place] on 21  No sign of volume overload    VTE Pharmacologic Prophylaxis:   Pharmacologic: Heparin  Mechanical VTE Prophylaxis in Place: No    Patient Centered Rounds: I have performed bedside rounds with nursing staff today  Discussions with Specialists or Other Care Team Provider:     Education and Discussions with Family / Patient: Patient and called his dtr to update as well    Time Spent for Care: 30 minutes  More than 50% of total time spent on counseling and coordination of care as described above  Current Length of Stay: 3 day(s)    Current Patient Status: Inpatient   Certification Statement: The patient will continue to require additional inpatient hospital stay due to Post op femoral endarectomy    Discharge Plan:     Code Status: Level 1 - Full Code      Subjective:   Sitting up in bed  No acute events overnight  He denies cp, sob, dizziness    Objective:     Vitals:   Temp (24hrs), Av 1 °F (37 3 °C), Min:97 7 °F (36 5 °C), Max:101 1 °F (38 4 °C)    Temp:  [97 7 °F (36 5 °C)-101 1 °F (38 4 °C)] 98 9 °F (37 2 °C)  HR:  [76-97] 87  Resp:  [15-20] 17  BP: ()/(46-59) 84/54  SpO2:  [92 %-98 %] 96 %  Body mass index is 33 29 kg/m²  Input and Output Summary (last 24 hours): Intake/Output Summary (Last 24 hours) at 7/10/2021 1941  Last data filed at 7/10/2021 1647  Gross per 24 hour   Intake 1698 75 ml   Output 1325 ml   Net 373 75 ml       Physical Exam:     Physical Exam  Constitutional:       Appearance: He is obese  Cardiovascular:      Rate and Rhythm: Normal rate and regular rhythm        Pulses: Normal pulses  Heart sounds: Normal heart sounds  No murmur heard  Pulmonary:      Effort: Pulmonary effort is normal  No respiratory distress  Breath sounds: Normal breath sounds  No wheezing or rales  Abdominal:      General: Abdomen is flat  Bowel sounds are normal  There is no distension  Palpations: Abdomen is soft  Tenderness: There is no abdominal tenderness  There is no guarding  Musculoskeletal:         General: Normal range of motion  Cervical back: Normal range of motion and neck supple  Right lower leg: No edema  Left lower leg: No edema  Comments: Palpable pulses   Skin:     General: Skin is warm and dry  Neurological:      General: No focal deficit present  Mental Status: He is alert and oriented to person, place, and time  Mental status is at baseline  Cranial Nerves: No cranial nerve deficit  Motor: No weakness  Additional Data:     Labs:    Results from last 7 days   Lab Units 07/10/21  1540 07/10/21  0527 07/07/21  1050   WBC Thousand/uL  --  7 87 6 39   HEMOGLOBIN g/dL 9 9* 9 7* 11 2*   HEMATOCRIT % 32 8* 32 2* 35 1*   PLATELETS Thousands/uL 194 206 231   NEUTROS PCT %  --   --  66   LYMPHS PCT %  --   --  19   MONOS PCT %  --   --  11   EOS PCT %  --   --  2     Results from last 7 days   Lab Units 07/10/21  0527 07/07/21  1050   SODIUM mmol/L 138 129*   POTASSIUM mmol/L 4 3 4 4   CHLORIDE mmol/L 111* 100   CO2 mmol/L 20* 19*   BUN mg/dL 13 29*   CREATININE mg/dL 1 03 1 28   ANION GAP mmol/L 7 10   CALCIUM mg/dL 9 0 9 4   ALBUMIN g/dL  --  3 7   TOTAL BILIRUBIN mg/dL  --  1 20*   ALK PHOS U/L  --  60   ALT U/L  --  31   AST U/L  --  21   GLUCOSE RANDOM mg/dL 99 97     Results from last 7 days   Lab Units 07/08/21  0405   INR  1 11                       * I Have Reviewed All Lab Data Listed Above  * Additional Pertinent Lab Tests Reviewed:  All Labs Within Last 24 Hours Reviewed    Imaging:    Imaging Reports Reviewed Today Include:   Imaging Personally Reviewed by Myself Includes:      Recent Cultures (last 7 days):           Last 24 Hours Medication List:   Current Facility-Administered Medications   Medication Dose Route Frequency Provider Last Rate    acetaminophen  650 mg Oral Q6H PRN Marcelo Mosqueda MD      albumin human  25 g Intravenous Once Oscar Gallardo DO      aspirin  81 mg Oral Daily Landy Alonso DPM      atorvastatin  80 mg Oral Daily With Marleny Ellis MD      clopidogrel  75 mg Oral Daily Marcelo Mosqueda MD      docusate sodium  100 mg Oral BID Marcelo Mosqueda MD      ferrous sulfate  325 mg Oral BID With Meals Marcelo Mosqueda MD      furosemide  40 mg Oral Daily Barbie Cullen MD      heparin (porcine)  5,000 Units Subcutaneous [facility] [address] Rodrigue Hernandez MD      HYDROmorphone  0 5 mg Intravenous Q4H PRN Marcelo Mosqueda MD      lidocaine  2 patch Topical Daily Maura Hernández PA-C      metoprolol succinate  12 5 mg Oral Daily Marcelo Mosqueda MD      ondansetron  4 mg Intravenous Q6H PRN Macrelo Mosqueda MD      oxyCODONE  10 mg Oral Q4H PRN [place] Ledezma PA-C      oxyCODONE  5 mg Oral Q4H PRN Marcelo Mosqueda MD      pantoprazole  40 mg Oral Early Morning Marcelo Mosqueda MD      sacubitril-valsartan  1 tablet Oral BID Barbie Cullen MD      tamsulosin  0 4 mg Oral Daily With aMrleny Ellis MD          Today, Patient Was Seen By: Oscar Gallardo DO    ** Please Note: Dictation voice to text software may have been used in the creation of this document   **

## 2021-07-10 NOTE — PROGRESS NOTES
Olivier Avendaño [id number] with Vascular Surgery, as patient SBP dropped to 90/47 manually  Patient alert  No complaints of lightheadedness or fatigue  Per vascular surgery protocol, 500 cc LR bolus initiated  No new orders at this time

## 2021-07-10 NOTE — ASSESSMENT & PLAN NOTE
· Patient with history of ischemic cardiomyopathy, chronic CHF, presented with right lower leg pain  · LEAD reveals >75% stenosis of the R CFA/SFA  · Vascular surgery following  · CTA runoff: High-grade distal right common femoral artery, proximal profunda femoris artery, and multifocal superficial femoral artery stenoses with probable three-vessel runoff to the foot   Distal runoff is limited due to dense calcifications  · S/p femoral endarterectomy 07/09  · On asa/plavix/statin

## 2021-07-10 NOTE — PLAN OF CARE
Problem: MOBILITY - ADULT  Goal: Maintain or return to baseline ADL function  Description: INTERVENTIONS:  -  Assess patient's ability to carry out ADLs; assess patient's baseline for ADL function and identify physical deficits which impact ability to perform ADLs (bathing, care of mouth/teeth, toileting, grooming, dressing, etc )  - Assess/evaluate cause of self-care deficits   - Assess range of motion  - Assess patient's mobility; develop plan if impaired  - Assess patient's need for assistive devices and provide as appropriate  - Encourage maximum independence but intervene and supervise when necessary  - Involve family in performance of ADLs  - Assess for home care needs following discharge   - Consider OT consult to assist with ADL evaluation and planning for discharge  - Provide patient education as appropriate  Outcome: Progressing  Goal: Maintains/Returns to pre admission functional level  Description: INTERVENTIONS:  - Perform BMAT or MOVE assessment daily    - Set and communicate daily mobility goal to care team and patient/family/caregiver  - Collaborate with rehabilitation services on mobility goals if consulted  - Perform Range of Motion  times a day  - Reposition patient every  hours    - Dangle patient  times a day  - Stand patient  times a day  - Ambulate patient  times a day  - Out of bed to chair 2 times a day   - Out of bed for meals 2 times a day  - Out of bed for toileting  - Record patient progress and toleration of activity level   Outcome: Progressing     Problem: PAIN - ADULT  Goal: Verbalizes/displays adequate comfort level or baseline comfort level  Description: Interventions:  - Encourage patient to monitor pain and request assistance  - Assess pain using appropriate pain scale  - Administer analgesics based on type and severity of pain and evaluate response  - Implement non-pharmacological measures as appropriate and evaluate response  - Consider cultural and social influences on pain and pain management  - Notify physician/advanced practitioner if interventions unsuccessful or patient reports new pain  Outcome: Progressing     Problem: INFECTION - ADULT  Goal: Absence or prevention of progression during hospitalization  Description: INTERVENTIONS:  - Assess and monitor for signs and symptoms of infection  - Monitor lab/diagnostic results  - Monitor all insertion sites, i e  indwelling lines, tubes, and drains  - Monitor endotracheal if appropriate and nasal secretions for changes in amount and color  - [place] appropriate cooling/warming therapies per order  - Administer medications as ordered  - Instruct and encourage patient and family to use good hand hygiene technique  - Identify and instruct in appropriate isolation precautions for identified infection/condition  Outcome: Progressing     Problem: SAFETY ADULT  Goal: Patient will remain free of falls  Description: INTERVENTIONS:  - Educate patient/family on patient safety including physical limitations  - Instruct patient to call for assistance with activity   - Consult OT/PT to assist with strengthening/mobility   - Keep Call bell within reach  - Keep bed low and locked with side rails adjusted as appropriate  - Keep care items and personal belongings within reach  - Initiate and maintain comfort rounds  - Make Fall Risk Sign visible to staff  - Offer Toileting every 2 Hours, in advance of need  - Initiate/Maintain bed alarm  - Obtain necessary fall risk management equipment:   - Apply yellow socks and bracelet for high fall risk patients  - Consider moving patient to room near nurses station  Outcome: Progressing     Problem: DISCHARGE PLANNING  Goal: Discharge to home or other facility with appropriate resources  Description: INTERVENTIONS:  - Identify barriers to discharge w/patient and caregiver  - Arrange for needed discharge resources and transportation as appropriate  - Identify discharge learning needs (meds, wound care, etc )  - Arrange for interpretive services to assist at discharge as needed  - Refer to Case Management Department for coordinating discharge planning if the patient needs post-hospital services based on physician/advanced practitioner order or complex needs related to functional status, cognitive ability, or social support system  Outcome: Progressing     Problem: Potential for Falls  Goal: Patient will remain free of falls  Description: INTERVENTIONS:  - Educate patient/family on patient safety including physical limitations  - Instruct patient to call for assistance with activity   - Consult OT/PT to assist with strengthening/mobility   - Keep Call bell within reach  - Keep bed low and locked with side rails adjusted as appropriate  - Keep care items and personal belongings within reach  - Initiate and maintain comfort rounds  - Make Fall Risk Sign visible to staff  - Offer Toileting every 2 Hours, in advance of need  - Initiate/Maintain bed alarm  - Obtain necessary fall risk management equipment:   - Apply yellow socks and bracelet for high fall risk patients  - Consider moving patient to room near nurses station  Outcome: Progressing     Problem: Prexisting or High Potential for Compromised Skin Integrity  Goal: Skin integrity is maintained or improved  Description: INTERVENTIONS:  - Identify patients at risk for skin breakdown  - Assess and monitor skin integrity  - Assess and monitor nutrition and hydration status  - Monitor labs   - Assess for incontinence   - Turn and reposition patient  - Assist with mobility/ambulation  - Relieve pressure over bony prominences  - Avoid friction and shearing  - Provide appropriate hygiene as needed including keeping skin clean and dry  - Evaluate need for skin moisturizer/barrier cream  - Collaborate with interdisciplinary team   - Patient/family teaching  - Consider wound care consult   Outcome: Progressing

## 2021-07-11 ENCOUNTER — APPOINTMENT (INPATIENT)
Dept: RADIOLOGY | Facility: HOSPITAL | Age: 76
DRG: 253 | End: 2021-07-11
Payer: COMMERCIAL

## 2021-07-11 ENCOUNTER — APPOINTMENT (INPATIENT)
Dept: NON INVASIVE DIAGNOSTICS | Facility: HOSPITAL | Age: 76
DRG: 253 | End: 2021-07-11
Payer: COMMERCIAL

## 2021-07-11 PROBLEM — M79.606 LOWER EXTREMITY PAIN: Status: ACTIVE | Noted: 2021-07-11

## 2021-07-11 PROBLEM — R33.9 URINARY RETENTION: Status: ACTIVE | Noted: 2021-07-11

## 2021-07-11 LAB
ANION GAP SERPL CALCULATED.3IONS-SCNC: 8 MMOL/L (ref 4–13)
BUN SERPL-MCNC: 17 MG/DL (ref 5–25)
CALCIUM SERPL-MCNC: 8.3 MG/DL (ref 8.3–10.1)
CHLORIDE SERPL-SCNC: 106 MMOL/L (ref 100–108)
CO2 SERPL-SCNC: 20 MMOL/L (ref 21–32)
CREAT SERPL-MCNC: 1.17 MG/DL (ref 0.6–1.3)
ERYTHROCYTE [DISTWIDTH] IN BLOOD BY AUTOMATED COUNT: 16.6 % (ref 11.6–15.1)
GFR SERPL CREATININE-BSD FRML MDRD: 61 ML/MIN/1.73SQ M
GLUCOSE SERPL-MCNC: 92 MG/DL (ref 65–140)
HCT VFR BLD AUTO: 29.1 % (ref 36.5–49.3)
HGB BLD-MCNC: 8.8 G/DL (ref 12–17)
MCH RBC QN AUTO: 25.3 PG (ref 26.8–34.3)
MCHC RBC AUTO-ENTMCNC: 30.2 G/DL (ref 31.4–37.4)
MCV RBC AUTO: 84 FL (ref 82–98)
PLATELET # BLD AUTO: 176 THOUSANDS/UL (ref 149–390)
PMV BLD AUTO: 10.3 FL (ref 8.9–12.7)
POTASSIUM SERPL-SCNC: 4 MMOL/L (ref 3.5–5.3)
PROCALCITONIN SERPL-MCNC: <0.05 NG/ML
RBC # BLD AUTO: 3.48 MILLION/UL (ref 3.88–5.62)
SODIUM SERPL-SCNC: 134 MMOL/L (ref 136–145)
WBC # BLD AUTO: 6.85 THOUSAND/UL (ref 4.31–10.16)

## 2021-07-11 PROCEDURE — 93922 UPR/L XTREMITY ART 2 LEVELS: CPT | Performed by: SURGERY

## 2021-07-11 PROCEDURE — 93922 UPR/L XTREMITY ART 2 LEVELS: CPT

## 2021-07-11 PROCEDURE — 84145 PROCALCITONIN (PCT): CPT | Performed by: INTERNAL MEDICINE

## 2021-07-11 PROCEDURE — 99232 SBSQ HOSP IP/OBS MODERATE 35: CPT | Performed by: INTERNAL MEDICINE

## 2021-07-11 PROCEDURE — 80048 BASIC METABOLIC PNL TOTAL CA: CPT | Performed by: INTERNAL MEDICINE

## 2021-07-11 PROCEDURE — 99024 POSTOP FOLLOW-UP VISIT: CPT | Performed by: SURGERY

## 2021-07-11 PROCEDURE — 85027 COMPLETE CBC AUTOMATED: CPT | Performed by: INTERNAL MEDICINE

## 2021-07-11 PROCEDURE — 73560 X-RAY EXAM OF KNEE 1 OR 2: CPT

## 2021-07-11 RX ORDER — GABAPENTIN 100 MG/1
100 CAPSULE ORAL 3 TIMES DAILY
Status: DISCONTINUED | OUTPATIENT
Start: 2021-07-11 | End: 2021-07-11

## 2021-07-11 RX ORDER — METHOCARBAMOL 500 MG/1
500 TABLET, FILM COATED ORAL EVERY 6 HOURS SCHEDULED
Status: DISCONTINUED | OUTPATIENT
Start: 2021-07-11 | End: 2021-07-13 | Stop reason: HOSPADM

## 2021-07-11 RX ORDER — ACETAMINOPHEN 325 MG/1
650 TABLET ORAL EVERY 6 HOURS SCHEDULED
Status: DISCONTINUED | OUTPATIENT
Start: 2021-07-11 | End: 2021-07-12

## 2021-07-11 RX ORDER — GABAPENTIN 100 MG/1
200 CAPSULE ORAL 3 TIMES DAILY
Status: DISCONTINUED | OUTPATIENT
Start: 2021-07-11 | End: 2021-07-13 | Stop reason: HOSPADM

## 2021-07-11 RX ADMIN — GABAPENTIN 200 MG: 100 CAPSULE ORAL at 21:39

## 2021-07-11 RX ADMIN — DOCUSATE SODIUM 100 MG: 100 CAPSULE ORAL at 09:06

## 2021-07-11 RX ADMIN — ACETAMINOPHEN 650 MG: 325 TABLET, FILM COATED ORAL at 04:28

## 2021-07-11 RX ADMIN — CLOPIDOGREL BISULFATE 75 MG: 75 TABLET ORAL at 09:02

## 2021-07-11 RX ADMIN — GABAPENTIN 200 MG: 100 CAPSULE ORAL at 18:19

## 2021-07-11 RX ADMIN — HEPARIN SODIUM 5000 UNITS: 5000 INJECTION INTRAVENOUS; SUBCUTANEOUS at 13:03

## 2021-07-11 RX ADMIN — OXYCODONE HYDROCHLORIDE 10 MG: 10 TABLET ORAL at 07:33

## 2021-07-11 RX ADMIN — SACUBITRIL AND VALSARTAN 1 TABLET: 24; 26 TABLET, FILM COATED ORAL at 09:11

## 2021-07-11 RX ADMIN — METHOCARBAMOL 500 MG: 500 TABLET, FILM COATED ORAL at 18:19

## 2021-07-11 RX ADMIN — HYDROMORPHONE HYDROCHLORIDE 0.5 MG: 1 INJECTION, SOLUTION INTRAMUSCULAR; INTRAVENOUS; SUBCUTANEOUS at 08:52

## 2021-07-11 RX ADMIN — FERROUS SULFATE TAB 325 MG (65 MG ELEMENTAL FE) 325 MG: 325 (65 FE) TAB at 07:30

## 2021-07-11 RX ADMIN — FERROUS SULFATE TAB 325 MG (65 MG ELEMENTAL FE) 325 MG: 325 (65 FE) TAB at 18:18

## 2021-07-11 RX ADMIN — HEPARIN SODIUM 5000 UNITS: 5000 INJECTION INTRAVENOUS; SUBCUTANEOUS at 21:39

## 2021-07-11 RX ADMIN — ACETAMINOPHEN 650 MG: 325 TABLET, FILM COATED ORAL at 11:32

## 2021-07-11 RX ADMIN — GABAPENTIN 100 MG: 100 CAPSULE ORAL at 09:07

## 2021-07-11 RX ADMIN — HEPARIN SODIUM 5000 UNITS: 5000 INJECTION INTRAVENOUS; SUBCUTANEOUS at 05:43

## 2021-07-11 RX ADMIN — FUROSEMIDE 40 MG: 40 TABLET ORAL at 09:03

## 2021-07-11 RX ADMIN — DOCUSATE SODIUM 100 MG: 100 CAPSULE ORAL at 18:18

## 2021-07-11 RX ADMIN — METOPROLOL SUCCINATE 12.5 MG: 25 TABLET, FILM COATED, EXTENDED RELEASE ORAL at 09:03

## 2021-07-11 RX ADMIN — PANTOPRAZOLE SODIUM 40 MG: 40 TABLET, DELAYED RELEASE ORAL at 05:43

## 2021-07-11 RX ADMIN — ATORVASTATIN CALCIUM 80 MG: 80 TABLET, FILM COATED ORAL at 18:18

## 2021-07-11 RX ADMIN — METHOCARBAMOL 500 MG: 500 TABLET, FILM COATED ORAL at 23:27

## 2021-07-11 RX ADMIN — LIDOCAINE 2 PATCH: 50 PATCH TOPICAL at 09:09

## 2021-07-11 RX ADMIN — ACETAMINOPHEN 650 MG: 325 TABLET, FILM COATED ORAL at 18:18

## 2021-07-11 RX ADMIN — ASPIRIN 81 MG CHEWABLE TABLET 81 MG: 81 TABLET CHEWABLE at 09:08

## 2021-07-11 RX ADMIN — METHOCARBAMOL 500 MG: 500 TABLET, FILM COATED ORAL at 11:31

## 2021-07-11 RX ADMIN — ACETAMINOPHEN 650 MG: 325 TABLET, FILM COATED ORAL at 23:27

## 2021-07-11 NOTE — ASSESSMENT & PLAN NOTE
· S/P  Chemotherapy and radiation in [place]  · Patient was receiving chemo every 4 weeks in [place], last chemo 4 weeks ago  · Patient wishes to establish care with Oncology at [facility]

## 2021-07-11 NOTE — PROGRESS NOTES
Patient was requesting BRAND NAME ONLY Synthroid, Mag tabs 84 mg 2 tabs Q Dinner, atenolol and strips. PCP approved Mg 84 mg, 2 tabs Q dinner. Nursing spoke to pharmacist who read back all meds and are available. Pt was updated. Advised to call with questions.    Progress Note - Vascular Surgery   Tomas Rene 76 y o  male MRN: [id number]  Unit/Bed#: [facility] 511-01 Encounter: [id number]    Assessment:  76 y o  male with past medical history of CHF, anemia, cardiomyopathy s/p TAVR on 6/14/21  Now presenting with worsening RLE rest pain of one month duration, loss of sensation to the right foot, and right foot weakness  1 Day Post-Op s/p Procedure(s) (LRB):  ENDARTERECTOMY ARTERIAL FEMORAL (Right)    Plan:  -Diet as tolerated  -Neurovascular checks  -Continue ASA/Statin/Plavix  -PT/OT  -Continue to closely monitor blood pressures  Patient currently asymptomatic, AAO x3, with urine output   -Entresto held last night, blood pressure this morning 110/55   -Consider work-up of right knee pain  -Rest of care per primary team    Subjective/Objective   Subjective: Patient offers no new complaints this morning  Patient denies nausea, vomiting, chest pain, shortness of breath, chills, fever  States that the majority of his pain is in his right knee / calf, but states that this is chronic in nature and largely unchanged since his hospital admission  Objective:     Blood pressure (!) 84/54, pulse 87, temperature 98 9 °F (37 2 °C), temperature source Oral, resp  rate 17, height 5' 7" (1 702 m), weight 96 4 kg (212 lb 8 4 oz), SpO2 96 %  ,Body mass index is 33 29 kg/m²        Intake/Output Summary (Last 24 hours) at 7/10/2021 2059  Last data filed at 7/10/2021 1647  Gross per 24 hour   Intake 1398 75 ml   Output 1175 ml   Net 223 75 ml       Invasive Devices     Peripheral Intravenous Line            Peripheral IV 07/07/21 Right Antecubital 3 days                Physical Exam:  Gen:    NAD  CV:      warm, well-perfused  Lungs: No respiratory distress  Abd:     soft, NT/ND  Ext:      RLE groin incision c/d/i  RLE with more motor function this AM  RLE with doppler AT/DP  Neuro: A&Ox3

## 2021-07-11 NOTE — ASSESSMENT & PLAN NOTE
Wt Readings from Last 3 Encounters:   07/11/21 99 1 kg (218 lb 6 4 oz)   07/06/21 97 3 kg (214 lb 8 oz)   07/01/21 98 8 kg (217 lb 12 8 oz)     · TTE 06/23/2021: LVEF 20%; underlying ICM  · Cardiology following  · Patient is currently euvolemic  · Cont on lasix, metoprolol  Initiated on low dose entresto per cardiology   Holding aldactone  · Cont to monitor fluid status  · Is supposed to be on lifevest but pt is non compliant, apparently he sent it back because if was loose fitting

## 2021-07-11 NOTE — PROGRESS NOTES
Patient hypotensive SBP in the 80s  Patient asymptomatic  Radha Cosme made aware  Will hold entresto and flomax  No other orders at this time

## 2021-07-11 NOTE — ASSESSMENT & PLAN NOTE
· Patient with history of ischemic cardiomyopathy, chronic CHF, presented with right lower leg pain  · LEAD reveals >75% stenosis of the R CFA/SFA  · Vascular surgery following  · CTA runoff: High-grade distal right common femoral artery, proximal profunda femoris artery, and multifocal superficial femoral artery stenoses with probable three-vessel runoff to the foot  Distal runoff is limited due to dense calcifications  · S/p femoral endarterectomy 07/09  · On asa/plavix/statin  · Post op KARLI pending  · C/o of neuropathic related pain RT heel; d/w vascular possibly chemo induced; started on gabapentin   Also c/o knee pain will obtain xr

## 2021-07-11 NOTE — ASSESSMENT & PLAN NOTE
S/p PCI to circumflex/RCA 2001  Most recent [facility] 6/2021 revealing diffuse moderate prox and mid disease/ISR, severe OM lesion and total mid LAD  On asa/plavix/statin/bb  Pt is cp free

## 2021-07-11 NOTE — ASSESSMENT & PLAN NOTE
X 1 episode likely post op related, no reoccurence  No leukocytosis, non toxic appearing  Low  procalcitonin

## 2021-07-11 NOTE — PROGRESS NOTES
[facility]  Progress Note Chau Villatoro Free 1945, 76 y o  male MRN: [id number]  Unit/Bed#: [facility] 143-85 Encounter: [id number]  Primary Care Provider: Demond Tinajero MD   Date and time admitted to hospital: 7/7/2021  9:48 AM    * Ischemic leg pain  Assessment & Plan  · Patient with history of ischemic cardiomyopathy, chronic CHF, presented with right lower leg pain  · LEAD reveals >75% stenosis of the R CFA/SFA  · Vascular surgery following  · CTA runoff: High-grade distal right common femoral artery, proximal profunda femoris artery, and multifocal superficial femoral artery stenoses with probable three-vessel runoff to the foot  Distal runoff is limited due to dense calcifications  · S/p femoral endarterectomy 07/09  · On asa/plavix/statin  · C/o of neuropathic related pain RT heel; d/w vascular possibly chemo induced; started on gabapentin  Also c/o knee pain will obtain xr      Fever  Assessment & Plan  X 1 episode likely post op related, no reoccurence  No leukocytosis, non toxic appearing  Low  procalcitonin      Chronic combined systolic and diastolic CHF (congestive heart failure) (HCC)  Assessment & Plan  Wt Readings from Last 3 Encounters:   07/11/21 99 1 kg (218 lb 6 4 oz)   07/06/21 97 3 kg (214 lb 8 oz)   07/01/21 98 8 kg (217 lb 12 8 oz)     · TTE 06/23/2021: LVEF 20%; underlying ICM  · Cardiology following  · Patient is currently euvolemic  · Cont on lasix, metoprolol  Initiated on low dose entresto per cardiology   Holding aldactone  · Cont to monitor fluid status  · Is supposed to be on lifevest but pt is non compliant, apparently he sent it back because if was loose fitting      Hypotension  Assessment & Plan  Chronically runs low as confirmed per cardiology likely due to low ef HF  Cont to monitor   Bp holding parameters placed on cardiac meds  May use prn albumin infusion if bp <90 or symptomatic      CAD (coronary artery disease)  Assessment & Plan  S/p PCI to circumflex/RCA 2001  Most recent [facility] 2021 revealing diffuse moderate prox and mid disease/ISR, severe OM lesion and total mid LAD  On asa/plavix/statin/bb  Pt is cp free    Urinary retention  Assessment & Plan  Moser removed   Cont urinary retention protocol  Required catherization x 1 yesterday    Prostate cancer ([address] )  Assessment & Plan  · S/P  Chemotherapy and radiation in [place]  · Patient was receiving chemo every 4 weeks in [place], last chemo 4 weeks ago  · Patient wishes to establish care with Oncology at [address] [facility]  Chronic,  H/H stable  Cont to monitor  Transfuse if < 7    S/P AVR  Assessment & Plan  AS status post TAVR in [place] on 21  No sign of volume overload    VTE Pharmacologic Prophylaxis:   Pharmacologic: Heparin  Mechanical VTE Prophylaxis in Place: No    Patient Centered Rounds: I have performed bedside rounds with nursing staff today  Discussions with Specialists or Other Care Team Provider: Vascular surgery    Education and Discussions with Family / Patient: Patient; called dtr no answer    Time Spent for Care: 30 minutes  More than 50% of total time spent on counseling and coordination of care as described above  Current Length of Stay: 4 day(s)    Current Patient Status: Inpatient   Certification Statement: The patient will continue to require additional inpatient hospital stay due to Acute illness    Discharge Plan:     Code Status: Level 1 - Full Code      Subjective:   Pt seen and examined at bedside  C/o neuropathic related pain in the RT heel  Reports of knee pain but states not as bad  No recurring fever  Required straight cath x 1 yesterday    Objective:     Vitals:   Temp (24hrs), Av 9 °F (37 2 °C), Min:97 7 °F (36 5 °C), Max:99 7 °F (37 6 °C)    Temp:  [97 7 °F (36 5 °C)-99 7 °F (37 6 °C)] 97 9 °F (36 6 °C)  HR:  [87-95] 90  Resp:  [17-18] 18  BP: ()/(45-61) 95/55  SpO2:  [91 %-96 %] 96 %  Body mass index is 34 21 kg/m²  Input and Output Summary (last 24 hours): Intake/Output Summary (Last 24 hours) at 7/11/2021 1408  Last data filed at 7/11/2021 1301  Gross per 24 hour   Intake 700 ml   Output 1725 ml   Net -1025 ml       Physical Exam:     Physical Exam  Constitutional:       Appearance: He is obese  Cardiovascular:      Rate and Rhythm: Normal rate and regular rhythm  Pulses: Normal pulses  Heart sounds: Normal heart sounds  Pulmonary:      Effort: Pulmonary effort is normal  No respiratory distress  Breath sounds: Normal breath sounds  No wheezing or rales  Abdominal:      General: Abdomen is flat  Bowel sounds are normal  There is no distension  Palpations: Abdomen is soft  Tenderness: There is no abdominal tenderness  There is no guarding  Musculoskeletal:         General: Normal range of motion  Cervical back: Normal range of motion and neck supple  Right lower leg: No edema  Left lower leg: No edema  Skin:     General: Skin is warm and dry  Neurological:      General: No focal deficit present  Mental Status: He is alert and oriented to person, place, and time  Mental status is at baseline  Cranial Nerves: No cranial nerve deficit  Motor: No weakness             Additional Data:     Labs:    Results from last 7 days   Lab Units 07/11/21  0436 07/07/21  1050   WBC Thousand/uL 6 85 6 39   HEMOGLOBIN g/dL 8 8* 11 2*   HEMATOCRIT % 29 1* 35 1*   PLATELETS Thousands/uL 176 231   NEUTROS PCT %  --  66   LYMPHS PCT %  --  19   MONOS PCT %  --  11   EOS PCT %  --  2     Results from last 7 days   Lab Units 07/11/21  0436 07/07/21  1050   SODIUM mmol/L 134* 129*   POTASSIUM mmol/L 4 0 4 4   CHLORIDE mmol/L 106 100   CO2 mmol/L 20* 19*   BUN mg/dL 17 29*   CREATININE mg/dL 1 17 1 28   ANION GAP mmol/L 8 10   CALCIUM mg/dL 8 3 9 4   ALBUMIN g/dL  --  3 7   TOTAL BILIRUBIN mg/dL  --  1 20*   ALK PHOS U/L  --  60   ALT U/L  --  31   AST U/L  --  21   GLUCOSE RANDOM mg/dL 92 97     Results from last 7 days   Lab Units 07/08/21  0405   INR  1 11             Results from last 7 days   Lab Units 07/11/21  0436   PROCALCITONIN ng/ml <0 05           * I Have Reviewed All Lab Data Listed Above  * Additional Pertinent Lab Tests Reviewed: All Labs Within Last 24 Hours Reviewed    Imaging:    Imaging Reports Reviewed Today Include:   Imaging Personally Reviewed by Myself Includes:      Recent Cultures (last 7 days):           Last 24 Hours Medication List:   Current Facility-Administered Medications   Medication Dose Route Frequency Provider Last Rate    acetaminophen  650 mg Oral Q6H Courtney Cordoba MD      aspirin  81 mg Oral Daily Robert Medina MD      atorvastatin  80 mg Oral Daily With Emily Moya MD      clopidogrel  75 mg Oral Daily Robert Medina MD      docusate sodium  100 mg Oral BID Robert Medina MD      ferrous sulfate  325 mg Oral BID With Meals Robert Medina MD      furosemide  40 mg Oral Daily Robert Medina MD      gabapentin  200 mg Oral TID Michel Rucker MD      heparin (porcine)  5,000 Units Subcutaneous [facility] Robert Medina MD      HYDROmorphone  0 5 mg Intravenous Q4H PRN Robert Medina MD      lidocaine  2 patch Topical Daily Robert Medina MD      methocarbamol  500 mg Oral Q6H [facility] Michel Rucker MD      metoprolol succinate  12 5 mg Oral Daily Robret Medina MD      ondansetron  4 mg Intravenous Q6H PRN Robert Medina MD      oxyCODONE  10 mg Oral Q4H PRN Robert Medina MD      oxyCODONE  5 mg Oral Q4H PRN Robert Medina MD      pantoprazole  40 mg Oral Early Morning Robert Medina MD      sacubitril-valsartan  1 tablet Oral BID Robert Medina MD      tamsulosin  0 4 mg Oral Daily With Emily Moya MD          Today, Patient Was Seen By: Teresa Palacios DO    ** Please Note: Dictation voice to text software may have been used in the creation of this document   ** You can access the FollowMyHealth Patient Portal offered by [facility] by registering at the following website: [email or web address]. By joining [employer]’s FollowMyHealth portal, you will also be able to view your health information using other applications (apps) compatible with our system.

## 2021-07-11 NOTE — PROGRESS NOTES
Output 825 ml   Net 475 ml     Physical Exam:  GEN: Royce Rene appears well, alert and oriented x 3, pleasant and cooperative   HEENT: pupils equal, round, and reactive to light; extraocular muscles intact  NECK: supple, no carotid bruits   HEART: regular rhythm, normal S1 and S2, no murmurs, clicks, gallops or rubs   LUNGS: clear to auscultation bilaterally; no wheezes, rales, or rhonchi   ABDOMEN: normal bowel sounds, soft, no tenderness, no distention  EXTREMITIES: RLE pain   Trace edema  NEURO: no focal findings   SKIN: normal without suspicious lesions on exposed skin    Medications:    Current Facility-Administered Medications:     acetaminophen (TYLENOL) tablet 650 mg, 650 mg, Oral, Q6H PRN, Alda Birch MD, 650 mg at 07/11/21 0428    aspirin chewable tablet 81 mg, 81 mg, Oral, Daily, Alda Birch MD, 81 mg at 07/11/21 0908    atorvastatin (LIPITOR) tablet 80 mg, 80 mg, Oral, Daily With Dinner, Alda Birch MD, 80 mg at 07/10/21 1644    clopidogrel (PLAVIX) tablet 75 mg, 75 mg, Oral, Daily, Alda Birch MD, 75 mg at 07/11/21 0902    docusate sodium (COLACE) capsule 100 mg, 100 mg, Oral, BID, Alda Birch MD, 100 mg at 07/11/21 7639    ferrous sulfate tablet 325 mg, 325 mg, Oral, BID With Meals, Alda Birch MD, 325 mg at 07/11/21 0730    furosemide (LASIX) tablet 40 mg, 40 mg, Oral, Daily, Alda Birch MD, 40 mg at 07/11/21 8787    gabapentin (NEURONTIN) capsule 100 mg, 100 mg, Oral, TID, Alda Birch MD, 100 mg at 07/11/21 8887    heparin (porcine) subcutaneous injection 5,000 Units, 5,000 Units, Subcutaneous, Q8H [facility], 5,000 Units at 07/11/21 0543 **AND** [COMPLETED] Platelet count, , , Once, Jorge Deshpande MD    HYDROmorphone (DILAUDID) injection 0 5 mg, 0 5 mg, Intravenous, Q4H PRN, Alda Birch MD, 0 5 mg at 07/11/21 0852    lidocaine (LIDODERM) 5 % patch 2 patch, 2 patch, Topical, Daily, Alda Birch MD, 2 patch at 07/11/21 0909    metoprolol succinate (TOPROL-XL) 24 hr tablet 12 5 mg, 12 5 mg, Oral, Daily, Lex Alonso MD, 12 5 mg at 07/11/21 0903    ondansetron (ZOFRAN) injection 4 mg, 4 mg, Intravenous, Q6H PRN, Lex Alonso MD    oxyCODONE (ROXICODONE) immediate release tablet 10 mg, 10 mg, Oral, Q4H PRN, Lex Alonso MD, 10 mg at 07/11/21 7235    oxyCODONE (ROXICODONE) IR tablet 5 mg, 5 mg, Oral, Q4H PRN, Lex Alonso MD, 5 mg at 07/10/21 1651    pantoprazole (PROTONIX) EC tablet 40 mg, 40 mg, Oral, Early Morning, Lex Alonso MD, 40 mg at 07/11/21 0543    sacubitril-valsartan (ENTRESTO) 24-26 MG per tablet 1 tablet, 1 tablet, Oral, BID, Lex Alonso MD, 1 tablet at 07/11/21 0911    tamsulosin (FLOMAX) capsule 0 4 mg, 0 4 mg, Oral, Daily With Dinner, Lex Alonso MD, 0 4 mg at 07/10/21 1644    Lab Results:      Results from last 7 days   Lab Units 07/11/21  0436 07/10/21  1540 07/10/21  0527 07/09/21  0438   WBC Thousand/uL 6 85  --  7 87 4 91   HEMOGLOBIN g/dL 8 8* 9 9* 9 7* 10 5*   HEMATOCRIT % 29 1* 32 8* 32 2* 33 9*   PLATELETS Thousands/uL 176 194 206 203         Results from last 7 days   Lab Units 07/11/21  0436 07/10/21  0527 07/09/21  0511 07/07/21  1050   SODIUM mmol/L 134* 138 136 129*   POTASSIUM mmol/L 4 0 4 3 4 1 4 4   CHLORIDE mmol/L 106 111* 105 100   CO2 mmol/L 20* 20* 21 19*   BUN mg/dL 17 13 18 29*   CREATININE mg/dL 1 17 1 03 1 03 1 28   CALCIUM mg/dL 8 3 9 0 9 3 9 4   ALK PHOS U/L  --   --   --  60   ALT U/L  --   --   --  31   AST U/L  --   --   --  21     Results from last 7 days   Lab Units 07/09/21  1200 07/09/21  0431 07/08/21  2030 07/08/21  0405 07/07/21  1305 07/07/21  1050   INR   --   --   --  1 11 1 05 1 04   PTT seconds 102* 57* 117* 134* 27 27         Telemetry: Personally reviewed  No significant arrhythmias    Echo:  LEFT VENTRICLE:  The ventricle was dilated  Systolic function was severely reduced  Ejection fraction was estimated to be 20 %    There was dyskinesis and aneurysmal deformity of the apical anterior, mid anteroseptal, apical inferior, apical septal, and apical wall(s)  Features were consistent with a pseudonormal left ventricular filling pattern, with concomitant abnormal relaxation and increased filling pressure (grade 2 diastolic dysfunction)  Doppler parameters were consistent with high ventricular filling pressure      MITRAL VALVE:  There was mild regurgitation      AORTIC VALVE:  A bioprosthesis was present  It exhibited normal function  Valve mean gradient was 10 mmHg

## 2021-07-11 NOTE — ASSESSMENT & PLAN NOTE
Chronically runs low as confirmed per cardiology likely due to low ef HF  Cont to monitor   Bp holding parameters placed on cardiac meds  May use prn albumin infusion if bp <90 or symptomatic

## 2021-07-12 LAB
ABO GROUP BLD BPU: NORMAL
ABO GROUP BLD BPU: NORMAL
ANION GAP SERPL CALCULATED.3IONS-SCNC: 6 MMOL/L (ref 4–13)
BPU ID: NORMAL
BPU ID: NORMAL
BUN SERPL-MCNC: 20 MG/DL (ref 5–25)
CALCIUM SERPL-MCNC: 8.4 MG/DL (ref 8.3–10.1)
CHLORIDE SERPL-SCNC: 107 MMOL/L (ref 100–108)
CO2 SERPL-SCNC: 22 MMOL/L (ref 21–32)
CREAT SERPL-MCNC: 1.11 MG/DL (ref 0.6–1.3)
CROSSMATCH: NORMAL
CROSSMATCH: NORMAL
ERYTHROCYTE [DISTWIDTH] IN BLOOD BY AUTOMATED COUNT: 16.4 % (ref 11.6–15.1)
GFR SERPL CREATININE-BSD FRML MDRD: 65 ML/MIN/1.73SQ M
GLUCOSE SERPL-MCNC: 93 MG/DL (ref 65–140)
HCT VFR BLD AUTO: 28.6 % (ref 36.5–49.3)
HGB BLD-MCNC: 8.6 G/DL (ref 12–17)
MAGNESIUM SERPL-MCNC: 1.8 MG/DL (ref 1.6–2.6)
MCH RBC QN AUTO: 25.1 PG (ref 26.8–34.3)
MCHC RBC AUTO-ENTMCNC: 30.1 G/DL (ref 31.4–37.4)
MCV RBC AUTO: 84 FL (ref 82–98)
PLATELET # BLD AUTO: 169 THOUSANDS/UL (ref 149–390)
PMV BLD AUTO: 10.3 FL (ref 8.9–12.7)
POTASSIUM SERPL-SCNC: 3.8 MMOL/L (ref 3.5–5.3)
PROCALCITONIN SERPL-MCNC: <0.05 NG/ML
RBC # BLD AUTO: 3.42 MILLION/UL (ref 3.88–5.62)
SODIUM SERPL-SCNC: 135 MMOL/L (ref 136–145)
UNIT DISPENSE STATUS: NORMAL
UNIT DISPENSE STATUS: NORMAL
UNIT PRODUCT CODE: NORMAL
UNIT PRODUCT CODE: NORMAL
UNIT RH: NORMAL
UNIT RH: NORMAL
WBC # BLD AUTO: 5.66 THOUSAND/UL (ref 4.31–10.16)

## 2021-07-12 PROCEDURE — 99232 SBSQ HOSP IP/OBS MODERATE 35: CPT | Performed by: INTERNAL MEDICINE

## 2021-07-12 PROCEDURE — NC001 PR NO CHARGE: Performed by: SURGERY

## 2021-07-12 PROCEDURE — 85027 COMPLETE CBC AUTOMATED: CPT | Performed by: INTERNAL MEDICINE

## 2021-07-12 PROCEDURE — 97167 OT EVAL HIGH COMPLEX 60 MIN: CPT

## 2021-07-12 PROCEDURE — 80048 BASIC METABOLIC PNL TOTAL CA: CPT | Performed by: INTERNAL MEDICINE

## 2021-07-12 PROCEDURE — 84145 PROCALCITONIN (PCT): CPT | Performed by: INTERNAL MEDICINE

## 2021-07-12 PROCEDURE — 97163 PT EVAL HIGH COMPLEX 45 MIN: CPT

## 2021-07-12 PROCEDURE — 83735 ASSAY OF MAGNESIUM: CPT | Performed by: INTERNAL MEDICINE

## 2021-07-12 RX ORDER — OXYCODONE HYDROCHLORIDE 5 MG/1
7.5 TABLET ORAL EVERY 4 HOURS PRN
Status: DISCONTINUED | OUTPATIENT
Start: 2021-07-12 | End: 2021-07-13 | Stop reason: HOSPADM

## 2021-07-12 RX ORDER — ACETAMINOPHEN 325 MG/1
975 TABLET ORAL EVERY 8 HOURS SCHEDULED
Status: DISCONTINUED | OUTPATIENT
Start: 2021-07-12 | End: 2021-07-13 | Stop reason: HOSPADM

## 2021-07-12 RX ORDER — LOSARTAN POTASSIUM 25 MG/1
12.5 TABLET ORAL DAILY
Status: DISCONTINUED | OUTPATIENT
Start: 2021-07-12 | End: 2021-07-13 | Stop reason: HOSPADM

## 2021-07-12 RX ORDER — OXYCODONE HYDROCHLORIDE 5 MG/1
2.5 TABLET ORAL EVERY 4 HOURS PRN
Status: DISCONTINUED | OUTPATIENT
Start: 2021-07-12 | End: 2021-07-13 | Stop reason: HOSPADM

## 2021-07-12 RX ORDER — FUROSEMIDE 40 MG/1
40 TABLET ORAL DAILY
Status: DISCONTINUED | OUTPATIENT
Start: 2021-07-12 | End: 2021-07-13 | Stop reason: HOSPADM

## 2021-07-12 RX ADMIN — ACETAMINOPHEN 975 MG: 325 TABLET, FILM COATED ORAL at 11:27

## 2021-07-12 RX ADMIN — LOSARTAN POTASSIUM 12.5 MG: 25 TABLET, FILM COATED ORAL at 11:30

## 2021-07-12 RX ADMIN — FERROUS SULFATE TAB 325 MG (65 MG ELEMENTAL FE) 325 MG: 325 (65 FE) TAB at 09:49

## 2021-07-12 RX ADMIN — TAMSULOSIN HYDROCHLORIDE 0.4 MG: 0.4 CAPSULE ORAL at 17:49

## 2021-07-12 RX ADMIN — ATORVASTATIN CALCIUM 80 MG: 80 TABLET, FILM COATED ORAL at 17:49

## 2021-07-12 RX ADMIN — OXYCODONE HYDROCHLORIDE 7.5 MG: 5 TABLET ORAL at 21:22

## 2021-07-12 RX ADMIN — METHOCARBAMOL 500 MG: 500 TABLET, FILM COATED ORAL at 11:28

## 2021-07-12 RX ADMIN — GABAPENTIN 200 MG: 100 CAPSULE ORAL at 17:49

## 2021-07-12 RX ADMIN — HEPARIN SODIUM 5000 UNITS: 5000 INJECTION INTRAVENOUS; SUBCUTANEOUS at 14:00

## 2021-07-12 RX ADMIN — METHOCARBAMOL 500 MG: 500 TABLET, FILM COATED ORAL at 17:49

## 2021-07-12 RX ADMIN — DOCUSATE SODIUM 100 MG: 100 CAPSULE ORAL at 17:49

## 2021-07-12 RX ADMIN — ACETAMINOPHEN 975 MG: 325 TABLET, FILM COATED ORAL at 21:22

## 2021-07-12 RX ADMIN — CLOPIDOGREL BISULFATE 75 MG: 75 TABLET ORAL at 09:51

## 2021-07-12 RX ADMIN — FERROUS SULFATE TAB 325 MG (65 MG ELEMENTAL FE) 325 MG: 325 (65 FE) TAB at 17:49

## 2021-07-12 RX ADMIN — FUROSEMIDE 40 MG: 40 TABLET ORAL at 11:30

## 2021-07-12 RX ADMIN — METHOCARBAMOL 500 MG: 500 TABLET, FILM COATED ORAL at 06:18

## 2021-07-12 RX ADMIN — GABAPENTIN 200 MG: 100 CAPSULE ORAL at 21:23

## 2021-07-12 RX ADMIN — DOCUSATE SODIUM 100 MG: 100 CAPSULE ORAL at 09:51

## 2021-07-12 RX ADMIN — ASPIRIN 81 MG CHEWABLE TABLET 81 MG: 81 TABLET CHEWABLE at 09:51

## 2021-07-12 RX ADMIN — LIDOCAINE 2 PATCH: 50 PATCH TOPICAL at 09:54

## 2021-07-12 RX ADMIN — GABAPENTIN 200 MG: 100 CAPSULE ORAL at 09:51

## 2021-07-12 RX ADMIN — HEPARIN SODIUM 5000 UNITS: 5000 INJECTION INTRAVENOUS; SUBCUTANEOUS at 06:18

## 2021-07-12 RX ADMIN — PANTOPRAZOLE SODIUM 40 MG: 40 TABLET, DELAYED RELEASE ORAL at 06:18

## 2021-07-12 RX ADMIN — ACETAMINOPHEN 650 MG: 325 TABLET, FILM COATED ORAL at 06:18

## 2021-07-12 RX ADMIN — METHOCARBAMOL 500 MG: 500 TABLET, FILM COATED ORAL at 23:45

## 2021-07-12 RX ADMIN — HEPARIN SODIUM 5000 UNITS: 5000 INJECTION INTRAVENOUS; SUBCUTANEOUS at 21:23

## 2021-07-12 NOTE — ASSESSMENT & PLAN NOTE
· Patient with history of ischemic cardiomyopathy, chronic CHF, presented with right lower leg pain  · LEAD reveals >75% stenosis of the R CFA/SFA  · Vascular surgery following  · CTA runoff: High-grade distal right common femoral artery, proximal profunda femoris artery, and multifocal superficial femoral artery stenoses with probable three-vessel runoff to the foot  Distal runoff is limited due to dense calcifications  · S/p femoral endarterectomy 07/09  · On asa/plavix/statin  · Post op KARLI improved  · C/o of neuropathic related pain RT heel; d/w vascular possibly chemo induced; started on gabapentin     · S/p XR of knee DJD arthritis  · APS following for pain management

## 2021-07-12 NOTE — OP NOTE
OPERATIVE REPORT  PATIENT NAME: Terri Rene    :  1945  MRN: [id number]  Pt Location: [facility] OR ROOM 02    SURGERY DATE: 2021    Surgeon(s) and Role:     * Serina Cook, DO - Primary     * Memo Root MD - [address] [place] [address], DO - Fellow    Preop Diagnosis:  Ischemic leg pain [M79 606, I99 8]    Post-Op Diagnosis Codes:     * Ischemic leg pain [M79 606, I99 8]    Procedure(s) (LRB):  ENDARTERECTOMY ARTERIAL FEMORAL (Right)    Specimen(s):  * No specimens in log *    Estimated Blood Loss:   250 mL    Drains:  [REMOVED] Urethral Catheter Latex 16 Fr  (Removed)   Reasons to continue Urinary Catheter  Post-operative urological requirements 21   Goal for Removal Remove POD#1 21   Site Assessment Clean;Skin intact; Patent 21   Collection Container Standard drainage bag 21   Securement Method Securing device (Describe) 21   Output (mL) 600 mL 07/10/21 0601   Number of days: 1       Anesthesia Type:   General    Operative Indications:  Ischemic leg pain [M79 606, I99 8]  Patient is a 66-year-old gentleman with significant cardiac comorbidities who  Recently underwent  Endovascular aortic valve replacement at outside facility  He had presented with reported worsening right leg pain over the last month  CT imaging remarkable for bulky atherosclerotic plaque at the right common bifurcation with near occlusion versus occlusion  He was initiated on heparin drip  Right common femoral endarterectomy recommended  The procedure, risks, benefits, alternatives, and anticipated postop course were discussed in detail  Of note patient had complained of "numbness"   And inability to move his toes preoperatively  We discussed that this may be permanent due to the chronicity of his symptoms  Despite this patient was accepting of the risks and wished to proceed    He was taken to the operating room for the above-mentioned procedure  Operative Findings:  Bulky plaque resulting in occlusion of distal right common femoral artery  The exposed segments of the proximal profunda and SFA were heavily diseased  Complications:   None    Procedure and Technique:   the patient was properly identified in the preop holding area  Patient's name, laterality, and nature procedure verified  The operative site was marked  Patient was brought to the operating room was positioned supine on the OR table  After adequate induction general anesthesia a radial artery monitoring line was placed  A Moser catheter was inserted under sterile conditions  The right groin was prepped using chlorhexidine and draped in usual sterile fashion  A formal time-out was performed  A preoperative dose of antibiotics was administered  A  Vertical right groin incision was carried out using a 15  Scalpel  This was deepened down through the subcutaneous tissue using the electrocautery  Dissection was carried down onto the femoral sheath using combination of electrocautery and sharp dissection  Self-retaining retractors were positioned to facilitate exposure  Of note there was no appreciable pulse noted within the distal common carotid artery  Dissection was carried cephalad to the inguinal ligament which was dissected medially and laterally  The distal external iliac artery was circumferentially dissected free from surrounding tissue and encircled with a silastic loop  Dissection was carried distally and the proximal profunda femoris and SFA were similarly dissected free from its surrounding tissue and encircled with additional vessel loops  The patient was systemically heparinized  ACT monitoring was carried out throughout the case an additional aliquots of heparin given as needed  The vessels were occluded using a combination of vessel loops and atraumatic vascular clamps  An arteriotomy was created using a 11   Scalpel and extended from the proximal SFA up to the proximal common femoral artery  The endarterectomy plane was created using a [place] elevator  An eversion profundoplasty was next carried out  Of note there was good backbleeding from the profunda femorals  There was minimal backbleeding from the superficial femoral artery  The endarterectomy site was copiously irrigated with heparinized saline  All  Grossly  Visible mobile plaque was removed  The proximal clamp was released to check inflow which was noted to be adequate  There was as mentioned above good backbleeding from the profunda femorals  A bovine pericardial patch was opened, prepped and brought onto the field  The patch was sewn into place using a 5-0 Prolene suture in a running fashion  Prior to completing the  Patch plasty the  vessels were back bled and flushed  Once the patch plasty was completed the vessel loops and clamps were removed restoring flow to the right lower extremity  Several repair sutures were placed to control bleeding sites  Hemostatic agents consisting of fibrillar and thrombin/gelfoam  Were applied to the patch site for needle hole oozing  Protamine was administered  Once hemostasis was deemed adequate the groin was irrigated  The incision was closed in a multilayer fashion  Exofin glue was applied to the incision site  All needles, instruments, and sponge counts reported correct  Patient tolerated procedure well  He is awake, extubated and transferred to recovery room in stable condition     I was present for the entire procedure and A qualified resident physician was not available    Patient Disposition:  PACU     SIGNATURE: Sanaz Weber DO  DATE: July 12, 2021  TIME: 8:44 AM

## 2021-07-12 NOTE — OCCUPATIONAL THERAPY NOTE
Occupational Therapy Evaluation     Patient Name: Lyndsay Rene  Today's Date: 7/12/2021  Problem List  Principal Problem:    Ischemic leg pain  Active Problems:    Chronic combined systolic and diastolic CHF (congestive heart failure) (HCC)    S/P AVR    Anemia    Prostate cancer (HCC)    CAD (coronary artery disease)    Fever    Hypotension    Urinary retention    Past Medical History  Past Medical History:   Diagnosis Date    [facility] ([facility] ) 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    Coronary artery disease 2001    S/p stenting to circumflex and RCA    HFrEF (heart failure with reduced ejection fraction) ([facility] ) 06/14/2021    High cholesterol     Prostate cancer [facility])      Past Surgical History  Past Surgical History:   Procedure Laterality Date    CARDIAC SURGERY      OK THROMBOENDARTECTMY FEMORAL COMMON Right 7/9/2021    Procedure: ENDARTERECTOMY ARTERIAL FEMORAL;  Surgeon: Mayuri Ch DO;  [facility]: [facility];  Service: Vascular        07/12/21 0912   OT Last Visit   OT Visit Date 07/12/21   Note Type   Note type Evaluation   Restrictions/Precautions   Weight Bearing Precautions Per Order No   Braces or Orthoses Other (Comment)  (pt denies)   Other Precautions Fall Risk;Multiple lines;Telemetry;Pain; Chair Alarm; Bed Alarm   Pain Assessment   Pain Assessment Tool 0-10   Pain Score 3   Pain Location/Orientation Orientation: Right;Location: Foot   Pain Onset/Description Onset: Ongoing   Home Living   Type of Home Apartment   Home Layout Multi-level   Bathroom Shower/Tub Tub/shower unit   Bathroom Toilet Standard   Bathroom Equipment Grab bars in shower; Shower chair;Hand-held shower   [address] Walker;Electric scooter;Stair glide  (rw used inside home; stair glide used to get up to 2nd floor)   Additional Comments Pt lives on the 2nd floor of a 3rd floor apartment with his son  Pt lives on the 2nd floor and takes a stairglide up 12 steps to enter the apartment   Once on the 2nd floor there are no additional steps  Prior Function   Level of [place] Independent with ADLs and functional mobility   Lives With Son   Receives Help From Family  (Pt reports that his son is not around much during the day)   ADL Assistance Independent   IADLs Independent   Falls in the last 6 months 0  (pt denies)   Vocational 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   Lifestyle   Autonomy Prior to R foot pain pt was I with all ADLs, IADLs, drove and completed functional mobility with rw inside the home and electric scooter in the community   Reciprocal Relationships Pt lives with his son and a friend of his son's (renting a room in the apt)   Service to Others Pt owns his own company   Intrinsic Gratification Pt enjoys keeping busy   Psychosocial   Psychosocial (WDL) WDL   ADL   Where Assessed Chair   Eating Assistance 5  Supervision/Setup   Grooming Assistance 5  Supervision/Setup   UB [address] 5  Supervision/Setup   LB Bathing Assistance 5  Supervision/Setup   UB Dressing Assistance 5  Supervision/Setup   LB Dressing Assistance 5  [address]  5  Supervision/Setup   Bed Mobility   Rolling L 6  Modified independent   Additional items HOB elevated; Increased time required; Bedrails   Supine to Sit 6  Modified independent   Additional items HOB elevated; Bedrails; Increased time required   Transfers   Sit to Stand 5  Supervision   Additional items Increased time required;Verbal cues;Armrests   Stand to Sit 5  Supervision   Additional items Increased time required;Verbal cues;Armrests   Functional Mobility   Functional Mobility 4  Minimal assistance  (CGA)   Additional Comments Pt demonstrated short household functional mobility    Additional items Rolling walker   Balance   Static Sitting Fair +   Dynamic Sitting Fair +   Static Standing Fair   Dynamic Standing Fair -   Ambulatory Fair -   Activity Tolerance   Activity Tolerance Patient limited by fatigue;Patient limited by pain   Medical Staff Made Aware co-eval with PT 2* pts medical complexity and comorbidities   Nurse Made Aware Nsg ok'd pt for therapy   Sensation   Light Touch Partial deficits in the RLE  (some sensation on bottom middle of R foot near arch)   Cognition   Overall Cognitive Status [facility]   Arousal/Participation Alert; Cooperative   Attention Within functional limits   Orientation Level Oriented X4   Memory Within functional limits   Following Commands Follows one step commands without difficulty   Comments Pt was alert and cooperative for therapy  Pt appears to be a poor historian  Assessment   Limitation Decreased ADL status; Decreased Safe judgement during ADL;Decreased endurance;Decreased sensation;Decreased self-care trans;Decreased high-level ADLs   Prognosis Fair   Assessment Pt is a 76 y o  male admitted to [facility] on 7/7/2021 2* R ischemic leg pain  Pt is s/p R CFEA on 7/9/2021  Pt  has a past medical history of Cancer ([facility] ) (01/2002), High cholesterol, and Prostate cancer ([facility] [facility] )  Pt has active orders for OT and OOB as tolerated orders  Pt lives with his son and tenant in a 3-story apartment  Pt lives on the 2nd floor and uses a stair glide to enter ([address])  Pt reports that his son and tenant both work during the day but when his son is not working he is able to provide pt with additional assistance  Prior to R ischemic leg pain pt reports I in all ADLs/IADLs, drove, and worked at his own business  Pt used a rw to complete functional mobility within his home and an electric scooter for community mobility  His foot pain has been limiting his functional mobility which has had an impact on his other occupations such as driving and meal preparation  Pt currently requires supervision/set-up for transfers, UB ADLs, and LB ADLs  Pt requires CGA for functional mobility   Occupational performance areas to address include bathing/shower, toilet hygiene, dressing, health maintenance, functional mobility and community mobility  Pt is currently limited by pain, endurance, activity tolerance, unsupportive home environment, decreased I w/ ADLS/IADLS, strength and sensation deficits  From an OT standpoint, anticipate d/c home with home OT pending pt progress  Pt will benefit from OT while in the hospital to address the following goals for: 3-5x/week to  w/in 10-14 days: Hiral Pham   LTG Time Frame 10-14   Long Term Goal #1 see goals below   Plan   Treatment Interventions ADL retraining;Functional transfer training; Endurance training;Patient/family training;Equipment evaluation/education; Compensatory technique education;Continued evaluation; Energy conservation; Activityengagement   Goal Expiration Date 21   OT Frequency 3-5x/wk   Recommendation   OT Discharge Recommendation Home with home health rehabilitation  (Home OT to address IADLs and safe transfers using commode)   Equipment Recommended Bedside commode   OT - OK to Discharge Yes  (when medically appropriate)   AM-PAC Daily Activity Inpatient   Lower Body Dressing 4   Bathing 4   Toileting 4   Upper Body Dressing 4   Grooming 4   Eating 4   Daily Activity Raw Score 24   Daily Activity Standardized Score (Calc for Raw Score >=11) 57 54   AM-PAC Applied Cognition Inpatient   Following a Speech/Presentation 4   Understanding Ordinary Conversation 4   Taking Medications 4   Remembering Where Things Are Placed or Put Away 4   Remembering List of 4-5 Errands 4   Taking Care of Complicated Tasks 4   Applied Cognition Raw Score 24   Applied Cognition Standardized Score 62 21   Modified [place] Scale   Modified [place] Scale 4     Goals:  1) Pt will complete functional mobility with Mod I and AE/DME PRN to increase independence in ADLs/IADLs  2) Pt will complete functional transfers on/off all surfaces with Mod I and AE/DME PRN to increase participation in ADLs/IADLs  3) Pt will complete UB dressing with Mod I and AE/DME PRN    4) Pt will complete LB dressing with Mod I and AE/DME PRN  5) Pt will complete toileting with Mod I and AE/DME PRN  6) Pt will participate in formal/functional cognitive assessment PRN to assist in safe d/c plan  7) Pt will demonstrate good safety awareness and problem solving skills during ADLs/IADLs with AE/DME PRN  8) Pt will increase activity tolerance to G for 30 minute session  9) Pt will participate in and demonstrate good carryover of energy conservation techniques during functional tasks      Stephanie DANIEL Peralta

## 2021-07-12 NOTE — PROGRESS NOTES
[address] [facility]  Progress Note Sussy Guy Free 1945, 76 y o  male MRN: [id number]  Unit/Bed#: [facility] 275-99 Encounter: [id number]  Primary Care Provider: Fanny Vega MD   Date and time admitted to hospital: 7/7/2021  9:48 AM    * Ischemic leg pain  Assessment & Plan  · Patient with history of ischemic cardiomyopathy, chronic CHF, presented with right lower leg pain  · LEAD reveals >75% stenosis of the R CFA/SFA  · Vascular surgery following  · CTA runoff: High-grade distal right common femoral artery, proximal profunda femoris artery, and multifocal superficial femoral artery stenoses with probable three-vessel runoff to the foot  Distal runoff is limited due to dense calcifications  · S/p femoral endarterectomy 07/09  · On asa/plavix/statin  · Post op KARLI improved  · C/o of neuropathic related pain RT heel; d/w vascular possibly chemo induced; started on gabapentin  · S/p XR of knee DJD arthritis  · APS following for pain management      Fever  Assessment & Plan  X 1 episode on 7/10 likely post op related, no reoccurence  No leukocytosis, non toxic appearing  Low  procalcitonin      Chronic combined systolic and diastolic CHF (congestive heart failure) (HCC)  Assessment & Plan  Wt Readings from Last 3 Encounters:   07/12/21 99 5 kg (219 lb 5 7 oz)   07/06/21 97 3 kg (214 lb 8 oz)   07/01/21 98 8 kg (217 lb 12 8 oz)     · TTE 06/23/2021: LVEF 20%; underlying ICM  · Cardiology following  · Patient is currently euvolemic  · Cont on lasix, metoprolol  Initiated on low dose entresto per cardiology however bp unable to tolerate thus dcd  Initated on losartan now,   · Holding aldactone  · Cont to monitor fluid status  · Is supposed to be on lifevest but pt is non compliant, apparently he sent it back because if was loose fitting      Hypotension  Assessment & Plan  Chronically runs low as confirmed per cardiology likely due to low ef HF  entresto discontinued   Initiated on losartan today  Bp holding parameters placed on cardiac meds  May use prn albumin infusion if bp <90 or symptomatic      CAD (coronary artery disease)  Assessment & Plan  S/p PCI to circumflex/RCA   Most recent [facility] 2021 revealing diffuse moderate prox and mid disease/ISR, severe OM lesion and total mid LAD  On asa/plavix/statin/bb  Pt is cp free    Urinary retention  Assessment & Plan  Moser removed   Cont urinary retention protocol      Prostate cancer ([facility] [facility] )  Assessment & Plan  · S/P  Chemotherapy and radiation in [place]  · Patient was receiving chemo every 4 weeks in [place], last chemo 4 weeks ago  · Patient wishes to establish care with Oncology at [address] [facility]  Chronic,  H/H stable  Cont to monitor  Transfuse if < 8 given hx of CAD    S/P AVR  Assessment & Plan  AS status post TAVR in [place] on 21  No sign of volume overload      VTE Pharmacologic Prophylaxis:   Pharmacologic: Heparin  Mechanical VTE Prophylaxis in Place: No    Patient Centered Rounds: I have performed bedside rounds with nursing staff today  Discussions with Specialists or Other Care Team Provider:     Education and Discussions with Family / Patient: Patient; Called his son Gilles Germain and updated    Time Spent for Care: 30 minutes  More than 50% of total time spent on counseling and coordination of care as described above  Current Length of Stay: 5 day(s)    Current Patient Status: Inpatient   Certification Statement: The patient will continue to require additional inpatient hospital stay due to Adjustment of cardiac meds, hypotension, pain control    Discharge Plan:     Code Status: Level 1 - Full Code      Subjective:   Reports of improved knee pain     BP intermittently on low side but asymptomatic  No acute events overnight    Objective:     Vitals:   Temp (24hrs), Av 7 °F (37 1 °C), Min:97 8 °F (36 6 °C), Max:99 8 °F (37 7 °C)    Temp:  [97 8 °F (36 6 °C)-99 8 °F (37 7 °C)] 97 8 °F (36 6 °C)  HR:  [73-93] 73  Resp:  [17-18] 17  BP: (101-120)/(48-65) 107/57  SpO2:  [96 %] 96 %  Body mass index is 34 36 kg/m²  Input and Output Summary (last 24 hours): Intake/Output Summary (Last 24 hours) at 7/12/2021 1922  Last data filed at 7/12/2021 1351  Gross per 24 hour   Intake 1260 ml   Output 1750 ml   Net -490 ml       Physical Exam:     Physical Exam  Constitutional:       Appearance: He is obese  Cardiovascular:      Rate and Rhythm: Normal rate and regular rhythm  Pulses: Normal pulses  Heart sounds: Normal heart sounds  Pulmonary:      Effort: Pulmonary effort is normal  No respiratory distress  Breath sounds: Normal breath sounds  No wheezing or rales  Abdominal:      General: Abdomen is flat  Bowel sounds are normal  There is no distension  Palpations: Abdomen is soft  Tenderness: There is no abdominal tenderness  There is no guarding  Musculoskeletal:         General: Normal range of motion  Cervical back: Normal range of motion and neck supple  Right lower leg: No edema  Left lower leg: No edema  Skin:     General: Skin is warm and dry  Neurological:      General: No focal deficit present  Mental Status: He is alert and oriented to person, place, and time  Mental status is at baseline  Cranial Nerves: No cranial nerve deficit  Motor: No weakness           Additional Data:     Labs:    Results from last 7 days   Lab Units 07/12/21  0344 07/07/21  1050   WBC Thousand/uL 5 66 6 39   HEMOGLOBIN g/dL 8 6* 11 2*   HEMATOCRIT % 28 6* 35 1*   PLATELETS Thousands/uL 169 231   NEUTROS PCT %  --  66   LYMPHS PCT %  --  19   MONOS PCT %  --  11   EOS PCT %  --  2     Results from last 7 days   Lab Units 07/12/21  0344 07/07/21  1050   SODIUM mmol/L 135* 129*   POTASSIUM mmol/L 3 8 4 4   CHLORIDE mmol/L 107 100   CO2 mmol/L 22 19*   BUN mg/dL 20 29*   CREATININE mg/dL 1 11 1 28   ANION GAP mmol/L 6 10   CALCIUM mg/dL 8 4 9 4   ALBUMIN g/dL  --  3 7   TOTAL BILIRUBIN mg/dL  --  1 20*   ALK PHOS U/L  --  60   ALT U/L  --  31   AST U/L  --  21   GLUCOSE RANDOM mg/dL 93 97     Results from last 7 days   Lab Units 07/08/21  0405   INR  1 11             Results from last 7 days   Lab Units 07/12/21  0540 07/11/21  0436   PROCALCITONIN ng/ml <0 05 <0 05           * I Have Reviewed All Lab Data Listed Above  * Additional Pertinent Lab Tests Reviewed:  All Labs Within Last 24 Hours Reviewed    Imaging:    Imaging Reports Reviewed Today Include:  Imaging Personally Reviewed by Myself Includes:      Recent Cultures (last 7 days):           Last 24 Hours Medication List:   Current Facility-Administered Medications   Medication Dose Route Frequency Provider Last Rate    acetaminophen  975 mg Oral Q8H [address] OMAIRA Vidal      aspirin  81 mg Oral Daily Jael Valdes MD      atorvastatin  80 mg Oral Daily With Dinner Jael Valdes MD      clopidogrel  75 mg Oral Daily Jael Valdes MD      docusate sodium  100 mg Oral BID Jael Valdes MD      ferrous sulfate  325 mg Oral BID With Meals Jael Valdes MD      furosemide  40 mg Oral Daily Aundrea Blake PA-C      gabapentin  200 mg Oral TID Memo Root MD      heparin (porcine)  5,000 Units Subcutaneous [facility] Jael Valdes MD      lidocaine  2 patch Topical Daily Jael Valdes MD      losartan  12 5 mg Oral Daily Aundrea Blake PA-C      methocarbamol  500 mg Oral Q6H [address] Memo Root MD      metoprolol succinate  12 5 mg Oral Daily Jael Valdes MD      ondansetron  4 mg Intravenous Q6H PRN Jael Valdes MD      oxyCODONE  2 5 mg Oral Q4H PRN OMAIRA Ding      oxyCODONE  7 5 mg Oral Q4H PRN OMAIRA Ding      pantoprazole  40 mg Oral Early Morning Jael Valdes MD      tamsulosin  0 4 mg Oral Daily With Letha Webbre MD          Today, Patient Was Seen By: Farhana Borja DO    ** Please Note: Dictation voice to text software may have been used in the creation of this document   **

## 2021-07-12 NOTE — PLAN OF CARE
Problem: OCCUPATIONAL THERAPY ADULT  Goal: Performs self-care activities at highest level of function for planned discharge setting  See evaluation for individualized goals  Description: Treatment Interventions: ADL retraining, Functional transfer training, Endurance training, Patient/family training, Equipment evaluation/education, Compensatory technique education, Continued evaluation, Energy conservation, Activityengagement  Equipment Recommended: Bedside commode       See flowsheet documentation for full assessment, interventions and recommendations  7/12/2021 1603 by Rafael Murcia  Outcome: Progressing  Note: Limitation: Decreased ADL status, Decreased Safe judgement during ADL, Decreased endurance, Decreased sensation, Decreased self-care trans, Decreased high-level ADLs  Prognosis: Fair  Assessment: Pt is a 76 y o  male admitted to [facility] on 7/7/2021 2* R ischemic leg pain  Pt is s/p R CFEA on 7/9/2021  Pt  has a past medical history of Cancer ([facility] [address] ) (01/2002), High cholesterol, and Prostate cancer ([facility] [address] )  Pt has active orders for OT and OOB as tolerated orders  Pt lives with his son and tenant in a 3-story apartment  Pt lives on the 2nd floor and uses a stair glide to enter ([address])  Pt reports that his son and tenant both work during the day but when his son is not working he is able to provide pt with additional assistance  Prior to R ischemic leg pain pt reports I in all ADLs/IADLs, drove, and worked at his own business  Pt used a rw to complete functional mobility within his home and an electric scooter for community mobility  His foot pain has been limiting his functional mobility which has had an impact on his other occupations such as driving and meal preparation  Pt currently requires supervision/set-up for transfers, UB ADLs, and LB ADLs  Pt requires CGA for functional mobility   Occupational performance areas to address include bathing/shower, toilet hygiene, dressing, health maintenance, functional mobility and community mobility  Pt is currently limited by pain, endurance, activity tolerance, unsupportive home environment, decreased I w/ ADLS/IADLS, strength and sensation deficits  From an OT standpoint, anticipate d/c home with home OT pending pt progress  Pt will benefit from OT while in the hospital to address the following goals for: 3-5x/week to  w/in 10-14 days:   OT Discharge Recommendation: Home with home health rehabilitation (Home OT to address IADLs and safe transfers using commode)  OT - OK to Discharge: Yes (when medically appropriate)    2021 1602 by Faye Dinero  Note: Limitation: Decreased ADL status, Decreased Safe judgement during ADL, Decreased endurance, Decreased sensation, Decreased self-care trans, Decreased high-level ADLs  Prognosis: Fair  Assessment: Pt is a 76 y o  male admitted to [facility] on 2021 2* R ischemic leg pain  Pt is s/p R CFEA on 2021  Pt  has a past medical history of Cancer ([facility] [address] ) (2002), High cholesterol, and Prostate cancer ([facility] [address] )  Pt has active orders for OT and OOB as tolerated orders  Pt lives with his son and tenant in a 3-story apartment  Pt lives on the 2nd floor and uses a stair glide to enter ([address])  Pt reports that his son and tenant both work during the day but when his son is not working he is able to provide pt with additional assistance  Prior to R ischemic leg pain pt reports I in all ADLs/IADLs, drove, and worked at his own business  Pt used a rw to complete functional mobility within his home and an electric scooter for community mobility  His foot pain has been limiting his functional mobility which has had an impact on his other occupations such as driving and meal preparation  Pt currently requires supervision/set-up for transfers, UB ADLs, and LB ADLs  Pt requires CGA for functional mobility   Occupational performance areas to address include bathing/shower, toilet hygiene, dressing, health maintenance, functional mobility and community mobility  Pt is currently limited by pain, endurance, activity tolerance, unsupportive home environment, decreased I w/ ADLS/IADLS, strength and sensation deficits  From an OT standpoint, anticipate d/c home with home OT pending pt progress  Pt will benefit from OT while in the hospital to address the following goals for: 3-5x/week to  w/in 10-14 days:         OT Discharge Recommendation: Home with home health rehabilitation (Home OT to address IADLs and safe transfers using commode)  OT - OK to Discharge: Yes (when medically appropriate)

## 2021-07-12 NOTE — PLAN OF CARE
Problem: MOBILITY - ADULT  Goal: Maintain or return to baseline ADL function  Description: INTERVENTIONS:  -  Assess patient's ability to carry out ADLs; assess patient's baseline for ADL function and identify physical deficits which impact ability to perform ADLs (bathing, care of mouth/teeth, toileting, grooming, dressing, etc )  - Assess/evaluate cause of self-care deficits   - Assess range of motion  - Assess patient's mobility; develop plan if impaired  - Assess patient's need for assistive devices and provide as appropriate  - Encourage maximum independence but intervene and supervise when necessary  - Involve family in performance of ADLs  - Assess for home care needs following discharge   - Consider OT consult to assist with ADL evaluation and planning for discharge  - Provide patient education as appropriate  Outcome: Progressing     Problem: PAIN - ADULT  Goal: Verbalizes/displays adequate comfort level or baseline comfort level  Description: Interventions:  - Encourage patient to monitor pain and request assistance  - Assess pain using appropriate pain scale  - Administer analgesics based on type and severity of pain and evaluate response  - Implement non-pharmacological measures as appropriate and evaluate response  - Consider cultural and social influences on pain and pain management  - Notify physician/advanced practitioner if interventions unsuccessful or patient reports new pain  Outcome: Progressing     Problem: SAFETY ADULT  Goal: Patient will remain free of falls  Description: INTERVENTIONS:  - Educate patient/family on patient safety including physical limitations  - Instruct patient to call for assistance with activity   - Consult OT/PT to assist with strengthening/mobility   - Keep Call bell within reach  - Keep bed low and locked with side rails adjusted as appropriate  - Keep care items and personal belongings within reach  - Initiate and maintain comfort rounds  - Make Fall Risk Sign visible to staff  - Apply yellow socks and bracelet for high fall risk patients  - Consider moving patient to room near nurses station  Outcome: Progressing     Problem: Potential for Falls  Goal: Patient will remain free of falls  Description: INTERVENTIONS:  - Educate patient/family on patient safety including physical limitations  - Instruct patient to call for assistance with activity   - Consult OT/PT to assist with strengthening/mobility   - Keep Call bell within reach  - Keep bed low and locked with side rails adjusted as appropriate  - Keep care items and personal belongings within reach  - Initiate and maintain comfort rounds  - Make Fall Risk Sign visible to staff  - Apply yellow socks and bracelet for high fall risk patients  - Consider moving patient to room near nurses station  Outcome: Progressing     Problem: Prexisting or High Potential for Compromised Skin Integrity  Goal: Skin integrity is maintained or improved  Description: INTERVENTIONS:  - Identify patients at risk for skin breakdown  - Assess and monitor skin integrity  - Assess and monitor nutrition and hydration status  - Monitor labs   - Assess for incontinence   - Turn and reposition patient  - Assist with mobility/ambulation  - Relieve pressure over bony prominences  - Avoid friction and shearing  - Provide appropriate hygiene as needed including keeping skin clean and dry  - Evaluate need for skin moisturizer/barrier cream  - Collaborate with interdisciplinary team   - Patient/family teaching  - Consider wound care consult   Outcome: Progressing

## 2021-07-12 NOTE — ASSESSMENT & PLAN NOTE
Chronically runs low as confirmed per cardiology likely due to low ef HF  entresto discontinued   Initiated on losartan today  Bp holding parameters placed on cardiac meds  May use prn albumin infusion if bp <90 or symptomatic

## 2021-07-12 NOTE — PROGRESS NOTES
Progress Note - Vascular Surgery   Ino Rene 76 y o  male MRN: [id number]  Unit/Bed#: [facility] 511-01 Encounter: [id number]    Assessment:  75 yo M with PMH of CHF, anemia and cardiomyopathy s/p TAVR on 6/14/21 who presents with worsening RLE rest pain for 1 month  Now s/p R femoral endarterectomy on 7/9 7/11 KARLI: R -0 69 (unobtainable previously), L - 1 26  7/11 XR right knee: no obvious fractrue, official read pending    Plan:  Continue neurovascular checks  Continue ASA/statin/plavix  PT/OT  Dispo planning  Rest of care per primary    Subjective/Objective     Subjective: Borderline hypotension overnight but patient remained asymptomatic from this, complaining of numbness in right foot, pain somewhat improved, denies other complaints at this time    Objective:    Blood pressure 101/61, pulse 86, temperature 98 5 °F (36 9 °C), temperature source Oral, resp  rate 18, height 5' 7" (1 702 m), weight 99 1 kg (218 lb 6 4 oz), SpO2 96 %  ,Body mass index is 34 21 kg/m²        Intake/Output Summary (Last 24 hours) at 7/12/2021 4490  Last data filed at 7/11/2021 2324  Gross per 24 hour   Intake 580 ml   Output 1525 ml   Net -945 ml       Invasive Devices     Peripheral Intravenous Line            Peripheral IV 07/11/21 Distal;Right;Ventral (anterior) Wrist <1 day                Physical Exam:   Gen:    NAD  CV:      warm, well-perfused  Lungs: No respiratory distress  Abd:     soft, NT/ND  Ext:      RLE groin incision c/d/i, loss of sensation in R foot, motor function intact  RLE with doppler AT/DP  Neuro: A&Ox3

## 2021-07-12 NOTE — PROGRESS NOTES
Advanced Heart Failure / Pulmonary Hypertension Service Progress Note    Luc Rene 76 y o  male   MRN: [id number]  Unit/Bed#: [facility] 511-01; Encounter: [id number]    Assessment:  Principal Problem:    Ischemic leg pain  Active Problems:    Chronic combined systolic and diastolic CHF (congestive heart failure) (HCC)    S/P AVR    Anemia    Prostate cancer (HCC)    CAD (coronary artery disease)    Fever    Hypotension    Urinary retention      Subjective:   Serenity Mcbride is a 44-year-old man with a PMH as below who presented to [facility] on 07/07/2021 with worsening right LE pain and numbness  Underwent right femoral endarterectomy on 07/09/2021  POD #3 right femoral endarterectomy  Patient seen and examined  No significant events overnight  Continues to experience right LE pain  Denies SOB, ANTUNEZ, chest pain, palpitations  Did have one episode of mild lightheadedness over the weekend occurring after standing  [place] for discharge  Objective: Intake/ Output: 820 mL / 2225 mL (net negative 1405 mL)  Weight: 219 lbs, bed weight (up from 218 lbs on 07/11)  Telemetry: No longer on telemetry  Today's Plan:   Discontinue Entresto, and start low-dose losartan given persistent hypotension   LifeVest fit during prior admission in 06/2021; patient has since sent it back and is not interested in continuing with device   Hemoglobin 8 6 this AM     Post-op care and pain management per primary and vascular surgery teams  Plan:  Right lower extremity ischemia   POD #3 right femoral endarterectomy  Continues on ASA, Plavix, and statin  Post-op care and pain management per primary team      Chronic HFrEF; LVEF 20%; LVIDd 5 9 cm; NYHA III; ACC/AHA Stage C              Etiology: ischemic +/- progression of valvular disease +/- chemotherapy (on abiraterone and leuprolide)  Safety of abiraterone in patients with LVEF <50% not established per FDA   [place]-analysis of abiraterone noted increased incidence and relative risk of CV toxicity  TTE 06/14/2021 (at [facility] pre- and post-TAVR, per report): LVEF 20%  LVIDd 5 4 cm  "entire apex, mid and apical anterior septum, mid and apical inferior septum, mid and apical inferior wall, mid inferolateral wall, and mid lateral wall are akinetic " Trace MR and TR  Pre-TAVR: LVOT VTI 12 0 cm  JOSE 0 47 cm^2  Post: bioprosthetic AV valve present; JOSE 2 41 cm^2  TTE 06/23/2021: LVEF 20%  LVIDd 5 9 cm  Grade 2 DD  "dyskinesis and aneurysmal deformity of the apical anterior, mid anteroseptal, apical inferior, apical septal, and apical wall(s) " Normal RV size and RVSF  Mild MR  AV bioprosthesis with normal leaflet separation  Trace TR  Reviewed importance of low sodium diet and fluid restriction  Strict I/Os with daily weights  CV diet with fluid restriction recommended  Neurohormonal Blockade:  --Beta Blocker: metoprolol succinate 12 5 mg daily  --ARNi / ACEi / ARB: losartan 12 5 mg daily  --Aldosterone Antagonist: No    --SGLT2 Inhibitor: No   --Home Diuretic: Lasix 40 mg daily  --Inpatient Diuretic: PO Lasix 40 mg daily  Sudden Cardiac Death Risk Reduction:  --LVEF 20%  LifeVest fit prior admission; patient has since sent back  --Plan to check limited TTE in mid September 2021 to reassess LVEF  Electrical Resynchronization:  --Candidacy for BiV device: LBBB with  ms  Advanced Therapies (if appropriate): Will continue to monitor  Prostate cancer with bony metastases              Receiving chemotherapy through the [facility]  Taking SQ leuprolide (Eligard) and abiraterone (Zytiga) - safety of abiraterone in patients with LVEF <50% not established per FDA  [place]-analysis of drug noted increased incidence and relative risk of CV toxicity      Coronary artery disease              Without active chest pain  S/p stenting to circumflex and RCA in 2001    [facility] 06/09/2021 (per documentation):"diffuse moderate prox and mid disease/ISR, severe OM lesion and total mid LAD "              Continue on ASA, Plavix, statin, and BB as above      Aortic stenosis      S/p TAVR (Evolut Pro+ 29mm) on 06/14/2021 at [facility]  Continues on ASA and Plavix       Hyperlipidemia  History of atrial tachycardia: s/p ablation at OSH in 2018  Benign prostatic hyperplasia     Vitals:   Blood pressure 120/65, pulse 93, temperature 99 8 °F (37 7 °C), temperature source Oral, resp  rate 18, height 5' 7" (1 702 m), weight 99 5 kg (219 lb 5 7 oz), SpO2 96 %  Body mass index is 34 36 kg/m²  I/O last 3 completed shifts: In: 1400 [P O :1300; IV Piggyback:100]  Out: 2325 [Urine:2325]    Wt Readings from Last 3 Encounters:   07/12/21 99 5 kg (219 lb 5 7 oz)   07/06/21 97 3 kg (214 lb 8 oz)   07/01/21 98 8 kg (217 lb 12 8 oz)     Vitals:    07/12/21 0340 07/12/21 0600 07/12/21 0740 07/12/21 1121   BP: 101/61  103/59 120/65   BP Location: Left arm  Left arm    Pulse:   93    Resp:   18    Temp:   99 8 °F (37 7 °C)    TempSrc:   Oral    SpO2:       Weight:  99 5 kg (219 lb 5 7 oz)     Height:           Physical Exam  Vitals reviewed  Constitutional:       General: He is awake  He is not in acute distress  Appearance: Normal appearance  He is well-developed  He is obese  HENT:      Head: Normocephalic  Nose: Nose normal       Mouth/Throat:      Mouth: Mucous membranes are moist    Eyes:      General: No scleral icterus  Conjunctiva/sclera: Conjunctivae normal    Neck:      Vascular: No JVD  Trachea: No tracheal deviation  Cardiovascular:      Rate and Rhythm: Normal rate and regular rhythm  No extrasystoles are present  Heart sounds: No murmur heard  Pulmonary:      Effort: Pulmonary effort is normal  No tachypnea, bradypnea or respiratory distress  Breath sounds: Normal air entry  No decreased air movement  No decreased breath sounds, wheezing or rales     Abdominal: General: Bowel sounds are normal  There is no distension  Palpations: Abdomen is soft  Tenderness: There is no abdominal tenderness  Musculoskeletal:      Cervical back: Neck supple  Right lower leg: Edema (trace) present  Left lower leg: Edema (trace) present  Skin:     General: Skin is warm and dry  Coloration: Skin is pale  Skin is not jaundiced  Neurological:      General: No focal deficit present  Mental Status: He is alert and oriented to person, place, and time  Psychiatric:         Attention and Perception: Attention normal          Mood and Affect: Mood and affect normal          Speech: Speech normal          Behavior: Behavior normal  Behavior is cooperative  Thought Content:  Thought content normal      Central Line (day, reason): No    Moser Catheter (day, reason): No      Current Facility-Administered Medications:     acetaminophen (TYLENOL) tablet 975 mg, 975 mg, Oral, Q8H [address], Kirstine Birmingham, CRNP, 975 mg at 07/12/21 1127    aspirin chewable tablet 81 mg, 81 mg, Oral, Daily, Nancy Quinonez MD, 81 mg at 07/12/21 0951    atorvastatin (LIPITOR) tablet 80 mg, 80 mg, Oral, Daily With Dinner, Nancy Quinonez MD, 80 mg at 07/11/21 1818    clopidogrel (PLAVIX) tablet 75 mg, 75 mg, Oral, Daily, Nancy Quinonez MD, 75 mg at 07/12/21 0951    docusate sodium (COLACE) capsule 100 mg, 100 mg, Oral, BID, Nancy Quinonez MD, 100 mg at 07/12/21 8839    ferrous sulfate tablet 325 mg, 325 mg, Oral, BID With Meals, Nancy Quinonez MD, 325 mg at 07/12/21 0949    furosemide (LASIX) tablet 40 mg, 40 mg, Oral, Daily, Catrachita Coelho PA-C, 40 mg at 07/12/21 1130    gabapentin (NEURONTIN) capsule 200 mg, 200 mg, Oral, TID, Telly Alcocer MD, 200 mg at 07/12/21 0951    heparin (porcine) subcutaneous injection 5,000 Units, 5,000 Units, Subcutaneous, Q8H [address], 5,000 Units at 07/12/21 0618 **AND** [COMPLETED] Platelet count, , , Once, MD Jayna Preston HYDROmorphone (DILAUDID) injection 0 5 mg, 0 5 mg, Intravenous, Q4H PRN, Dorys Burnett MD, 0 5 mg at 07/11/21 0852    lidocaine (LIDODERM) 5 % patch 2 patch, 2 patch, Topical, Daily, Dorys Burnett MD, 2 patch at 07/12/21 0954    losartan (COZAAR) tablet 12 5 mg, 12 5 mg, Oral, Daily, Neymar Garcia PA-C, 12 5 mg at 07/12/21 1130    methocarbamol (ROBAXIN) tablet 500 mg, 500 mg, Oral, Q6H [address], Jaycob Garrido MD, 500 mg at 07/12/21 1128    metoprolol succinate (TOPROL-XL) 24 hr tablet 12 5 mg, 12 5 mg, Oral, Daily, Dorys Burnett MD, 12 5 mg at 07/11/21 0903    ondansetron (ZOFRAN) injection 4 mg, 4 mg, Intravenous, Q6H PRN, Dorys Burnett MD    oxyCODONE (ROXICODONE) immediate release tablet 10 mg, 10 mg, Oral, Q4H PRN, Dorys Burnett MD, 10 mg at 07/11/21 9845    oxyCODONE (ROXICODONE) IR tablet 5 mg, 5 mg, Oral, Q4H PRN, Dorys Burnett MD, 5 mg at 07/10/21 1651    pantoprazole (PROTONIX) EC tablet 40 mg, 40 mg, Oral, Early Morning, Dorys Burnett MD, 40 mg at 07/12/21 0618    tamsulosin (FLOMAX) capsule 0 4 mg, 0 4 mg, Oral, Daily With Dinner, Dorys Burnett MD, 0 4 mg at 07/10/21 1644    Labs & Results:      Results from last 7 days   Lab Units 07/12/21  0344 07/11/21  0436 07/10/21  1540 07/10/21  0527   WBC Thousand/uL 5 66 6 85  --  7 87   HEMOGLOBIN g/dL 8 6* 8 8* 9 9* 9 7*   HEMATOCRIT % 28 6* 29 1* 32 8* 32 2*   PLATELETS Thousands/uL 169 176 194 206         Results from last 7 days   Lab Units 07/12/21  0344 07/11/21  0436 07/10/21  0527 07/07/21  1050   POTASSIUM mmol/L 3 8 4 0 4 3 4 4   CHLORIDE mmol/L 107 106 111* 100   CO2 mmol/L 22 20* 20* 19*   BUN mg/dL 20 17 13 29*   CREATININE mg/dL 1 11 1 17 1 03 1 28   CALCIUM mg/dL 8 4 8 3 9 0 9 4   ALK PHOS U/L  --   --   --  60   ALT U/L  --   --   --  31   AST U/L  --   --   --  21     Results from last 7 days   Lab Units 07/08/21  0405 07/07/21  1305 07/07/21  1050   INR  1 11 1 05 1 04     Eren FranciscosMELODIE

## 2021-07-12 NOTE — TELEPHONE ENCOUNTER
Angela Staples called to schedule patient, per Usha Alexander patient needs to be seen on 7/19/21    OV scheduled for 7/19/21 with [facility]

## 2021-07-12 NOTE — ASSESSMENT & PLAN NOTE
· S/P  Chemotherapy and radiation in [place]  · Patient was receiving chemo every 4 weeks in [place], last chemo 4 weeks ago  · Patient wishes to establish care with Oncology at [facility]

## 2021-07-12 NOTE — ASSESSMENT & PLAN NOTE
S/p PCI to circumflex/RCA 2001  Most recent [facility] 6/2021 revealing diffuse moderate prox and mid disease/ISR, severe OM lesion and total mid LAD  On asa/plavix/statin/bb  Pt is cp free

## 2021-07-12 NOTE — DISCHARGE INSTR - OTHER ORDERS
R groin incision:  · Shower daily  · Wash incision daily w/ soap and water  Pat dry thoroughly    · Place clean, dry gauze to cover incision to keep area clean and dry and to prevent skin- skin contact

## 2021-07-12 NOTE — ASSESSMENT & PLAN NOTE
Wt Readings from Last 3 Encounters:   07/12/21 99 5 kg (219 lb 5 7 oz)   07/06/21 97 3 kg (214 lb 8 oz)   07/01/21 98 8 kg (217 lb 12 8 oz)     · TTE 06/23/2021: LVEF 20%; underlying ICM  · Cardiology following  · Patient is currently euvolemic  · Cont on lasix, metoprolol  Initiated on low dose entresto per cardiology however bp unable to tolerate thus dcd   Initated on losartan now,   · Holding aldactone  · Cont to monitor fluid status  · Is supposed to be on lifevest but pt is non compliant, apparently he sent it back because if was loose fitting

## 2021-07-12 NOTE — CASE MANAGEMENT
Pt is already open to [facility] for nsg, added home PT  Pt  Has RW, added BSC from [employer]

## 2021-07-12 NOTE — PHYSICAL THERAPY NOTE
Physical Therapy Evaluation     Patient's Name: Rohini Rene    Admitting Diagnosis  Ischemic cardiomyopathy [I25 5]  Right leg weakness [R29 898]  PAD (peripheral artery disease) ([facility]) [I48 7]  Chronic diastolic CHF (congestive heart failure) ([facility]) [I50 32]  Ischemic leg pain [M79 606, I99 8]    Problem List  Patient Active Problem List   Diagnosis    Chronic combined systolic and diastolic CHF (congestive heart failure) ([facility])    S/P AVR    Anemia    Chronic midline low back pain    Ischemic cardiomyopathy    Ischemic leg pain    Prostate cancer ([facility] )    CAD (coronary artery disease)    Fever    Hypotension    Lower extremity pain    Urinary retention       Past Medical History  Past Medical History:   Diagnosis Date    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    High cholesterol     Prostate cancer [facility])        Past Surgical History  Past Surgical History:   Procedure Laterality Date    CARDIAC SURGERY            07/12/21 0913   PT Last Visit   PT Visit Date 07/12/21   Note Type   Note type Evaluation   Pain Assessment   Pain Assessment Tool 0-10   Pain Score 3   Pain Location/Orientation Orientation: Right;Location: Foot   Home Living   Type of Home Apartment  (second floor)   Home Layout One level  (stair glide up to apartment)   Bathroom Shower/Tub Tub/shower unit   [employer] Grab bars in shower; Shower chair   Home Equipment Walker;Electric scooter;Stair glide   Additional Comments Pt reports use of RW in home and scooter in community  Prior Function   Level of [place] Independent with ADLs and functional mobility   Lives With Son   ADL Assistance Independent   IADLs Independent   Falls in the last 6 months 0   Comments Pt reports son works during the day, but he can set him up in the AM before work if needed  Restrictions/Precautions   Weight Bearing Precautions Per Order No   Other Precautions Chair Alarm; Bed Alarm; Fall Risk;Multiple lines;Telemetry;Pain General   Family/Caregiver Present No   Cognition   Overall Cognitive Status WFL   Arousal/Participation Alert   Attention Within functional limits   Orientation Level Oriented X4   Memory Decreased recall of precautions   Following Commands Follows one step commands without difficulty   Comments Pt with some decreased safety awareness and insight into deficits  RLE Assessment   RLE Assessment   (functionally 4-/5)   LLE Assessment   LLE Assessment   (functionally 4-/5)   Light Touch   RLE Light Touch   (absent below ankle except middle of bottom of foot)   Bed Mobility   Supine to Sit 6  Modified independent   Additional items HOB elevated; Bedrails; Increased time required   Additional Comments Supine in bed upon PT arrival   Pt left upright in bedside chair with all needs in reach and chair alarm intact  Transfers   Sit to Stand 5  Supervision   Additional items Increased time required;Verbal cues   Stand to Sit 5  Supervision   Additional items Increased time required;Verbal cues   Additional Comments Transfers with RW   VC for hand placement and safety  Ambulation/Elevation   Gait pattern Excessively slow; Short stride; Step to;Decreased R stance;Decreased foot clearance   Gait Assistance   (CGA)   Additional items Assist x 1;Verbal cues; Tactile cues   Assistive Device Rolling walker   Distance 8 ft x 2  (limited by foot pain)   Balance   Static Sitting Fair +   Dynamic Sitting Fair +   Static Standing Fair   Dynamic Standing Fair -   Ambulatory Fair -   Endurance Deficit   Endurance Deficit Yes   Endurance Deficit Description pain, fatigue   Activity Tolerance   Activity Tolerance Patient limited by fatigue;Patient limited by pain   Medical Staff Made Aware OT, OTS   Nurse Made Aware RN cleared pt to be seen by PT   Assessment   Prognosis Good   Problem List Decreased strength;Decreased endurance; Impaired balance;Decreased mobility; Decreased safety awareness;Pain   Assessment Pt seen for high complexity PT evaluation due to decrease in functional mobility status compared to baseline  Pt with active PT eval/treat and up and OOB as tolerated orders at this time  Pt is a 76 y o  M who presented to [facility] with ischemic leg pain on 7/7/21  Pt is s/p R CFEA on 7/9/21  Pt  has a past medical history of Cancer ([facility] ) (01/2002), High cholesterol, and Prostate cancer ([facility] )  Pt resides with son in second floor apartment with stair glide to enter  Pt presents with decreased strength, balance, endurance that contribute to limitations in bed mobility, functional transfers, functional mobility  Pt requires supervision for STS and CGA for ambulation with RW at this time  Pt left upright in bedside chair with chair alarm intact and with all needs in reach  Pt will benefit from skilled therapy in order to address current impairments and functional limitations  PT to follow pt and recommending HHPT once medically cleared  The patient's AM-PAC Basic Mobility Inpatient Short Form Raw Score is 20, Standardized Score is 43 99  A standardized score greater than 42 9 suggests the patient may benefit from discharge to home  Please also refer to the recommendation of the Physical Therapist for safe discharge planning  Barriers to Discharge None   Goals   Patient Goals to go home   STG Expiration Date 07/26/21   Short Term Goal #1 1  Pt will demonstrate ability to perform all aspects of bed mobility with I in order to increase independence and decrease burden on caregivers  2  Pt will demonstrate ability to perform functional transfers with Mod I in order to increase independence and decrease burden on caregivers  3  Pt will demonstrate ability to ambulate 200 ft with least restrictive AD with Mod I in order to return to mobility safely  4  Pt will demonstrate ability to negotiate 1 curb steps with/without HR and Mod I in order to return to household/community mobility safely   5  Pt will demonstrate improved balance by one grade order to decrease risk of falls  6  Pt will increase b/l LE strength by 1 grade in order to increase ease of functional mobility and transfers  Plan   Treatment/Interventions Functional transfer training;LE strengthening/ROM; Elevations; Therapeutic exercise; Endurance training;Patient/family training;Equipment eval/education; Bed mobility;Gait training;Spoke to nursing   PT Frequency Other (Comment)  (3-5x/wk)   Recommendation   PT Discharge Recommendation Home with home health rehabilitation   Artie Diaz walker   Change/add to [employer]?  No   PT - OK to Discharge Yes  (once medically cleared)   AM-PAC Basic Mobility Inpatient   Turning in Bed Without Bedrails 4   Lying on Back to Sitting on Edge of Flat Bed 4   Moving Bed to Chair 3   Standing Up From Chair 3   Walk in Room 3   Climb 3-5 Stairs 3   Basic Mobility Inpatient Raw Score 20   Basic Mobility Standardized Score 43 99   Modified Mello Scale   Modified Mello Scale 4         Mary Urbina, PT, DPT

## 2021-07-12 NOTE — ASSESSMENT & PLAN NOTE
X 1 episode on 7/10 likely post op related, no reoccurence  No leukocytosis, non toxic appearing  Low  procalcitonin

## 2021-07-13 ENCOUNTER — DOCUMENTATION (OUTPATIENT)
Dept: VASCULAR SURGERY | Facility: CLINIC | Age: 76
End: 2021-07-13

## 2021-07-13 VITALS
DIASTOLIC BLOOD PRESSURE: 63 MMHG | SYSTOLIC BLOOD PRESSURE: 107 MMHG | WEIGHT: 213.41 LBS | HEART RATE: 88 BPM | BODY MASS INDEX: 33.49 KG/M2 | OXYGEN SATURATION: 96 % | TEMPERATURE: 98 F | RESPIRATION RATE: 20 BRPM | HEIGHT: 67 IN

## 2021-07-13 LAB
ANION GAP SERPL CALCULATED.3IONS-SCNC: 9 MMOL/L (ref 4–13)
BASOPHILS # BLD AUTO: 0.04 THOUSANDS/ΜL (ref 0–0.1)
BASOPHILS NFR BLD AUTO: 1 % (ref 0–1)
BUN SERPL-MCNC: 15 MG/DL (ref 5–25)
CALCIUM SERPL-MCNC: 8.3 MG/DL (ref 8.3–10.1)
CHLORIDE SERPL-SCNC: 109 MMOL/L (ref 100–108)
CO2 SERPL-SCNC: 20 MMOL/L (ref 21–32)
CREAT SERPL-MCNC: 0.95 MG/DL (ref 0.6–1.3)
EOSINOPHIL # BLD AUTO: 0.17 THOUSAND/ΜL (ref 0–0.61)
EOSINOPHIL NFR BLD AUTO: 3 % (ref 0–6)
ERYTHROCYTE [DISTWIDTH] IN BLOOD BY AUTOMATED COUNT: 16.3 % (ref 11.6–15.1)
GFR SERPL CREATININE-BSD FRML MDRD: 78 ML/MIN/1.73SQ M
GLUCOSE SERPL-MCNC: 97 MG/DL (ref 65–140)
HCT VFR BLD AUTO: 29.3 % (ref 36.5–49.3)
HGB BLD-MCNC: 8.7 G/DL (ref 12–17)
IMM GRANULOCYTES # BLD AUTO: 0.02 THOUSAND/UL (ref 0–0.2)
IMM GRANULOCYTES NFR BLD AUTO: 0 % (ref 0–2)
LYMPHOCYTES # BLD AUTO: 0.93 THOUSANDS/ΜL (ref 0.6–4.47)
LYMPHOCYTES NFR BLD AUTO: 17 % (ref 14–44)
MCH RBC QN AUTO: 24.9 PG (ref 26.8–34.3)
MCHC RBC AUTO-ENTMCNC: 29.7 G/DL (ref 31.4–37.4)
MCV RBC AUTO: 84 FL (ref 82–98)
MONOCYTES # BLD AUTO: 0.49 THOUSAND/ΜL (ref 0.17–1.22)
MONOCYTES NFR BLD AUTO: 9 % (ref 4–12)
NEUTROPHILS # BLD AUTO: 3.94 THOUSANDS/ΜL (ref 1.85–7.62)
NEUTS SEG NFR BLD AUTO: 70 % (ref 43–75)
NRBC BLD AUTO-RTO: 0 /100 WBCS
PLATELET # BLD AUTO: 191 THOUSANDS/UL (ref 149–390)
PMV BLD AUTO: 10.2 FL (ref 8.9–12.7)
POTASSIUM SERPL-SCNC: 3.8 MMOL/L (ref 3.5–5.3)
RBC # BLD AUTO: 3.5 MILLION/UL (ref 3.88–5.62)
SODIUM SERPL-SCNC: 138 MMOL/L (ref 136–145)
WBC # BLD AUTO: 5.59 THOUSAND/UL (ref 4.31–10.16)

## 2021-07-13 PROCEDURE — 85025 COMPLETE CBC W/AUTO DIFF WBC: CPT | Performed by: NURSE PRACTITIONER

## 2021-07-13 PROCEDURE — 99232 SBSQ HOSP IP/OBS MODERATE 35: CPT | Performed by: PHYSICIAN ASSISTANT

## 2021-07-13 PROCEDURE — 99231 SBSQ HOSP IP/OBS SF/LOW 25: CPT | Performed by: INTERNAL MEDICINE

## 2021-07-13 PROCEDURE — 80048 BASIC METABOLIC PNL TOTAL CA: CPT | Performed by: NURSE PRACTITIONER

## 2021-07-13 PROCEDURE — 99239 HOSP IP/OBS DSCHRG MGMT >30: CPT | Performed by: INTERNAL MEDICINE

## 2021-07-13 RX ORDER — METHOCARBAMOL 500 MG/1
500 TABLET, FILM COATED ORAL EVERY 6 HOURS SCHEDULED
Qty: 120 TABLET | Refills: 0 | Status: SHIPPED | OUTPATIENT
Start: 2021-07-13 | End: 2021-09-15 | Stop reason: SDUPTHER

## 2021-07-13 RX ORDER — GABAPENTIN 100 MG/1
200 CAPSULE ORAL 3 TIMES DAILY
Qty: 90 CAPSULE | Refills: 0 | Status: SHIPPED | OUTPATIENT
Start: 2021-07-13 | End: 2021-07-28

## 2021-07-13 RX ORDER — LIDOCAINE 50 MG/G
2 PATCH TOPICAL DAILY
Qty: 15 PATCH | Refills: 0 | Status: SHIPPED | OUTPATIENT
Start: 2021-07-14 | End: 2021-08-16 | Stop reason: SDUPTHER

## 2021-07-13 RX ORDER — OXYCODONE HYDROCHLORIDE AND ACETAMINOPHEN 5; 325 MG/1; MG/1
1 TABLET ORAL EVERY 4 HOURS PRN
Qty: 30 TABLET | Refills: 0 | Status: SHIPPED | OUTPATIENT
Start: 2021-07-13 | End: 2021-07-23

## 2021-07-13 RX ORDER — ASPIRIN 81 MG/1
81 TABLET, CHEWABLE ORAL DAILY
Qty: 30 TABLET | Refills: 0 | Status: SHIPPED | OUTPATIENT
Start: 2021-07-14

## 2021-07-13 RX ORDER — OXYCODONE HYDROCHLORIDE 5 MG/1
2.5 TABLET ORAL EVERY 4 HOURS PRN
Qty: 30 TABLET | Refills: 0 | Status: SHIPPED | OUTPATIENT
Start: 2021-07-13 | End: 2021-07-23

## 2021-07-13 RX ORDER — ACETAMINOPHEN 325 MG/1
975 TABLET ORAL EVERY 8 HOURS SCHEDULED
Qty: 90 TABLET | Refills: 0 | Status: SHIPPED | OUTPATIENT
Start: 2021-07-13

## 2021-07-13 RX ORDER — METOPROLOL SUCCINATE 25 MG/1
12.5 TABLET, EXTENDED RELEASE ORAL DAILY
Qty: 30 TABLET | Refills: 0 | Status: SHIPPED | OUTPATIENT
Start: 2021-07-14 | End: 2021-08-16 | Stop reason: SDUPTHER

## 2021-07-13 RX ORDER — LOSARTAN POTASSIUM 25 MG/1
12.5 TABLET ORAL DAILY
Qty: 30 TABLET | Refills: 0 | Status: SHIPPED | OUTPATIENT
Start: 2021-07-14 | End: 2021-07-30 | Stop reason: SDUPTHER

## 2021-07-13 RX ORDER — DOCUSATE SODIUM 100 MG/1
100 CAPSULE, LIQUID FILLED ORAL 2 TIMES DAILY
Qty: 10 CAPSULE | Refills: 0 | Status: SHIPPED | OUTPATIENT
Start: 2021-07-13 | End: 2022-03-29

## 2021-07-13 RX ADMIN — CLOPIDOGREL BISULFATE 75 MG: 75 TABLET ORAL at 08:03

## 2021-07-13 RX ADMIN — HEPARIN SODIUM 5000 UNITS: 5000 INJECTION INTRAVENOUS; SUBCUTANEOUS at 05:58

## 2021-07-13 RX ADMIN — ACETAMINOPHEN 975 MG: 325 TABLET, FILM COATED ORAL at 05:58

## 2021-07-13 RX ADMIN — METHOCARBAMOL 500 MG: 500 TABLET, FILM COATED ORAL at 05:57

## 2021-07-13 RX ADMIN — PANTOPRAZOLE SODIUM 40 MG: 40 TABLET, DELAYED RELEASE ORAL at 05:58

## 2021-07-13 RX ADMIN — HEPARIN SODIUM 5000 UNITS: 5000 INJECTION INTRAVENOUS; SUBCUTANEOUS at 14:38

## 2021-07-13 RX ADMIN — ASPIRIN 81 MG CHEWABLE TABLET 81 MG: 81 TABLET CHEWABLE at 08:02

## 2021-07-13 RX ADMIN — METHOCARBAMOL 500 MG: 500 TABLET, FILM COATED ORAL at 11:40

## 2021-07-13 RX ADMIN — FERROUS SULFATE TAB 325 MG (65 MG ELEMENTAL FE) 325 MG: 325 (65 FE) TAB at 08:02

## 2021-07-13 RX ADMIN — GABAPENTIN 200 MG: 100 CAPSULE ORAL at 08:02

## 2021-07-13 RX ADMIN — LIDOCAINE 2 PATCH: 50 PATCH TOPICAL at 08:04

## 2021-07-13 RX ADMIN — ACETAMINOPHEN 975 MG: 325 TABLET, FILM COATED ORAL at 14:38

## 2021-07-13 RX ADMIN — METOPROLOL SUCCINATE 12.5 MG: 25 TABLET, FILM COATED, EXTENDED RELEASE ORAL at 08:03

## 2021-07-13 RX ADMIN — FUROSEMIDE 40 MG: 40 TABLET ORAL at 08:03

## 2021-07-13 RX ADMIN — LOSARTAN POTASSIUM 12.5 MG: 25 TABLET, FILM COATED ORAL at 08:03

## 2021-07-13 NOTE — DISCHARGE INSTRUCTIONS
Heart Failure   WHAT YOU NEED TO KNOW:   Heart failure is a condition that does not allow your heart to fill or pump properly  Not enough oxygen in your blood gets to your organs and tissues  Fluid may not move through your body properly  Fluid builds up and causes swelling and trouble breathing  This is known as congestive heart failure  Heart failure may start in the left or right ventricle  Heart failure is often caused by damage or injury to your heart  The damage may be caused by other heart problems, diabetes, or high blood pressure  The damage may have also been caused by an infection  Heart failure is a long-term condition that tends to get worse over time  It is important to manage your health to improve your quality of life  DISCHARGE INSTRUCTIONS:   Call your local emergency number (911 in the [facility]) if:   · You have any of the following signs of a heart attack:      ? Squeezing, pressure, or pain in your chest    ? You may  also have any of the following:     § Discomfort or pain in your back, neck, jaw, stomach, or arm    § Shortness of breath    § Nausea or vomiting    § Lightheadedness or a sudden cold sweat      Call your doctor if:   · Your heartbeat is fast, slow, or uneven all the time  · You have symptoms of worsening heart failure:      ? Shortness of breath at rest, at night, or that is getting worse in any way    ? Weight gain of 3 or more pounds (1 4 kg) in a day, or more than your healthcare provider says is okay    ? More swelling in your legs or ankles    ? Abdominal pain or swelling    ? More coughing    ? Loss of appetite    ? Feeling tired all the time    · You feel hopeless or depressed, or you have lost interest in things you used to enjoy  · You often feel worried or afraid  · You have questions or concerns about your condition or care  Medicines:   · Medicines  may be given to help regulate your heart rhythm and lower your blood pressure   You may also need medicines to help decrease extra fluids  Medicines, such as NSAIDs, may be stopped if they are causing your heart failure to become worse  Do not stop any of your medicines on your own  · Take your medicine as directed  Contact your healthcare provider if you think your medicine is not helping or if you have side effects  Tell him or her if you are allergic to any medicine  Keep a list of the medicines, vitamins, and herbs you take  Include the amounts, and when and why you take them  Bring the list or the pill bottles to follow-up visits  Carry your medicine list with you in case of an emergency  Go to cardiac rehab if directed:  Cardiac rehab is a program run by specialists who will help you safely strengthen your heart  In the program you will learn about exercise, relaxation, stress management, and heart-healthy nutrition  Manage swelling from extra fluid:   · Elevate (raise) your legs above the level of your heart  This will help with fluid that builds up in your legs or ankles  Elevate your legs as often as possible during the day  Prop your legs on pillows or blankets to keep them elevated comfortably  Try not to stand for long periods of time during the day  Move around to keep your blood circulating  · Limit sodium (salt)  Ask how much sodium you can have each day  Your healthcare provider may give you a limit, such as 2,300 milligrams (mg) a day  Your provider or a dietitian can teach you how to read food labels for the number of mg in a food  He or she can also help you find ways to have less salt  For example, if you add salt to food as you cook, do not add more at the table  · Drink liquids as directed  You may need to limit the amount of liquid you drink within 24 hours  Your healthcare provider will tell you how much liquid to have and which liquids are best for you  He or she may tell you to limit liquid to 1 5 to 2 liters in a day   He or she will also tell you how often to drink liquid throughout the day  · Weigh yourself every morning  Use the same scale, in the same spot  Do this after you use the bathroom, but before you eat or drink  Wear the same type of clothing each time  Write down your weight and call your healthcare provider if you have a sudden weight gain  Swelling and weight gain are signs of fluid buildup  Manage heart failure: Your quality of life may improve with treatment and the following:  · Do not smoke  Nicotine and other chemicals in cigarettes and cigars can cause lung and heart damage  Ask your healthcare provider for information if you currently smoke and need help to quit  E-cigarettes or smokeless tobacco still contain nicotine  Talk to your healthcare provider before you use these products  · Do not drink alcohol or use illegal drugs  Alcohol and drugs can increase your risk for high blood pressure, diabetes, and coronary artery disease  · Eat heart-healthy foods  Heart-healthy foods include fruits, vegetables, lean meat (such as beef, chicken, or pork), and low-fat dairy products  Fatty fish such as salmon and tuna are also heart healthy  Other heart-healthy foods include walnuts, whole-grain breads, beans, and cooked beans  Replace butter and margarine with heart-healthy oils such as olive oil or canola oil  Your provider or a dietitian can help you create heart-healthy meal plans  · Manage any chronic health conditions you have  These include high blood pressure, diabetes, obesity, high cholesterol, metabolic syndrome, and COPD  You will have fewer symptoms if you manage these health conditions  Follow your healthcare provider's recommendations and follow up with him or her regularly  · Maintain a healthy weight  Being overweight can increase your risk for high blood pressure, diabetes, and coronary artery disease  These conditions can make your symptoms worse  Ask your healthcare provider how much you should weigh   Ask him or her to help you create a weight loss plan if you are overweight  · Stay active  Activity can help keep your symptoms from getting worse  Walking is a type of physical activity that helps maintain your strength and improve your mood  Physical activity also helps you manage your weight  Work with your healthcare provider to create an exercise plan that is right for you  · Get vaccines as directed  The flu and pneumonia can be severe for a person who has heart failure  Vaccines protect you from these infections  Get a flu shot every year as soon as it is recommended, usually in September or October  You may also need the pneumonia vaccine  Your healthcare provider can tell you if you need other vaccines, and when to get them  Follow up with your doctor within 7 days and as directed: You may need to return for other tests  You may need home health care  A healthcare provider will monitor your vital signs, weight, and make sure your medicines are working  Write down your questions so you remember to ask them during your visits  Join a support group:  Heart failure can be difficult to manage  It may be helpful to talk with others who have heart failure  You may learn how to better manage your condition or get emotional support  For more information:  · [address]  [place] , [place]   Phone: 5- 462 - [phone]  Web Address: https://www strong [employer]/  [facility] [employer] Hernandez 2021 Information is for End User's use only and may not be sold, redistributed or otherwise used for commercial purposes  All illustrations and images included in CareNotes® are the copyrighted property of A D A M , Inc  or [address] [place], [place] [address]kevin   The above information is an  only  It is not intended as medical advice for individual conditions or treatments   Talk to your doctor, nurse or pharmacist before following any medical regimen to see if it is safe and effective for you      DISCHARGE INSTRUCTIONS  LEG SURGERY    Following discharge from the hospital, you may have some questions about your operation, your activities or your general condition  These instructions may answer some of your questions and help you adjust during the first few weeks following your operation  ACTIVITY:  Limit your physical activity to slow walking  Avoid climbing or heavy lifting for the first four weeks after surgery  Initially some discomfort will be felt when walking as the skin and muscle tissues heal  You may require help with walking or feel more secure with someone to lean on  Walking up steps and normal activities may be resumed, as you feel ready  You should not drive a car for one week following discharge from the hospital   You may ride in a car for short trips upon discharge  DIET:  Resume your normal diet  Try to eat low fat and low cholesterol foods  Good nutrition is important for healing of your incision  INCISION:     R groin incision:  · Shower daily  · Wash incision daily w/ soap and water  Pat dry thoroughly  · Place clean, dry gauze to cover incision to keep area clean and dry and to prevent skin- skin contact     You may include the operated area in a shower on the third day following surgery  Wash your incision daily with soap and water  Pat the incision dry and leave open to air  Do not apply lotions, ointments, creams or powders to incision  Sitting in a tub is not recommended for the first two weeks following surgery or if you have any open wounds  It is normal to have swelling or discoloration around the incision  If increasing redness or pain develops, call our office immediately  Numbness in the region of the incision may occur following the surgery  This normally resolves in six to twelve months  If any of your incisions are open and require dressing changes, you will be given instruction for your daily incision care    If you are not able to change the dressings, a visiting nurse will be arranged  Your physician may have chosen to use a type of adhesive glue to close your incision  There are stitches present under the skin which will absorb on their own  The glue is used to cover the incision, assist in closure, and prevent contamination  This adhesive will darken and peel away on its own within one to two weeks  Alternatively, your surgeon may have chosen to use skin staples to close your incision which will be removed in the office at the time of your follow-up appointment  You may wash the incision with the staples present  LEG SWELLING: Most patients have noticeable leg swelling after leg surgery  This usually disappears within a few weeks  If swelling is present, elevate the leg whenever possible  Avoid sitting with the leg hanging down for prolonged time periods  Walking is beneficial   An ACE bandage or support stocking may be helpful, but this should be discussed with your physician prior to use  FOLLOW UP STUDIES:  Doppler ultrasound studies are very important to your post-operative care  Your surgeon will arrange for them at your first postoperative visit  Repeat studies are then scheduled every three months for the first year and periodically after this  Appt milena Vo June, PAC: 7/19/2021 at 4:30pm, Farhat Martelle office  [address] [facility], [address] [place] [address], [address] [place] [address] THE OFFICE IF YOU HAVE ANY QUESTIONS    [phone] Tutu Gamble [phone] [facility] FREE [phone]  [address] [place] [address] , [address] [facility], [facility]  [address], [address], [place], [address] [facility]  [address]   [address]  [facility], [address]  [address] [facility], [address] [facility] [address]  [address], [address]st Floor, [address]  [facility], [address] [facility], [address] [facility] 97   [address] [facility], [address] [place] [address], [address] [place] [address]  [facility], [address] [facility], [facility], Aj [address] 6

## 2021-07-13 NOTE — PROGRESS NOTES
Heart Failure/ Pulmonary Hypertension Progress Note - Sara Rene 76 y o  male MRN: [id number]    Unit/Bed#: [facility] 511-01 Encounter: [id number]      Assessment:    Principal Problem:    Ischemic leg pain  Active Problems:    Chronic combined systolic and diastolic CHF (congestive heart failure) (HCC)    S/P AVR    Anemia    Prostate cancer (HCC)    CAD (coronary artery disease)    Fever    Hypotension    Urinary retention      Subjective:   Patient seen and examined  No significant events overnight  POD #4 right femoral endarterectomy  No labs done today  Right leg pain improving  Objective: Intake/ Output: 1260/2025/-765  Weight: 213 lbs  Tele: not on tele        RLE ischemia  POD #4 right femoral endarterectomy  Continues on ASA, Plavix, and statin  Post-op care and pain management per primary team  Repeat CBC now  LEAD with >75% stenosis of the CFA/SFA  For CTA with runoff       Newly diagnosed HFrEF; LVEF 20%; LVIDd 5 9 cm; NYHA III; ACC/AHA Stage C  Etiology: ischemic +/- progression of valvular disease +/- chemotherapy (on abiraterone and leuprolide with unclear safety profile)  Examines WWP  Euvolemic  MAP stable  Optimize GDMT as able       TTE 06/14/2021 (at [facility] pre- and post-TAVR, per report): LVEF 20%  LVIDd 5 4 cm  "entire apex, mid and apical anterior septum, mid and apical inferior septum, mid and apical inferior wall, mid inferolateral wall, and mid lateral wall are akinetic " Trace MR and TR  Pre-TAVR: LVOT VTI 12 0 cm  JOSE 0 47 cm^2  Post: bioprosthetic AV valve present; JOSE 2 41 cm^2                  TTE 06/23/2021: LVEF 20%  LVIDd 5 9 cm  Grade 2 DD  "dyskinesis and aneurysmal deformity of the apical anterior, mid anteroseptal, apical inferior, apical septal, and apical wall(s) " Normal RV size and RVSF  Mild MR  AV bioprosthesis with normal leaflet separation   Trace TR      Neurohormonal Blockade:  --Beta-Blocker: Metoprolol succinate 12 5 mg daily  --ACEi, ARB or ARNi: Losartan 12 5 mg daily, Entresto d/c due to hypotn  (or SVR reduction)  --Aldosterone Receptor Blocker: Spironolactone 12 5 mg daily - on hold  --SGLT2-I:   --Diuretic: Lasix 40 mg daily    Sudden Cardiac Death Risk Reduction:  --ICD: EF 20%  Returned [place]  Interrogation:     Electrical Resynchronization:  -- LBBB  Interrogation:     Advanced Therapies (If appropriate): --Inotrope:  --LVAD/Transplant Candidacy:    Prostate cancer with bony metastases              [facility]               Taking SQ leuprolide (Eligard) and abiraterone (Zytiga) - safety of abiraterone in patients with LVEF <50% not established per FDA  Docena-analysis of drug noted increased incidence and relative risk of CV toxicity      Coronary artery disease              Without active chest pain  S/pstenting to circumflex and RCA in 2001  [facility] 06/09/2021 (per documentation):"diffuse moderate prox and mid disease/ISR, severe OM lesion and total mid LAD  "              Continue on Plavix, statin, and BB as above      Aortic stenosis      S/p TAVR (Evolut Pro+ 29mm) on 06/14/2021 at [facility]        Continues on Plavix       Hyperlipidemia  No recent FLP  Continue on atorvastatin 80 mg daily       History of atrial tachycardia: s/p ablation at OSH in 2018  Benign prostatic hyperplasia       Review of Systems   All other systems reviewed and are negative  [employer] (day, reason): Moser catheter (day, reason):    Vitals: Blood pressure 102/51, pulse 88, temperature 97 9 °F (36 6 °C), resp  rate 16, height 5' 7" (1 702 m), weight 96 8 kg (213 lb 6 5 oz), SpO2 96 %  , Body mass index is 33 42 kg/m² , I/O last 3 completed shifts:   In: 2940 [P O :1740]  Out: 3200 [Urine:3200]  I/O this shift:  In: 118 [P O :118]  Out: 300 [Urine:300]  Wt Readings from Last 3 Encounters:   07/13/21 96 8 kg (213 lb 6 5 oz)   07/06/21 97 3 kg (214 lb 8 oz)   07/01/21 98 8 kg (217 lb 12 8 oz)       Intake/Output Summary (Last 24 hours) at 7/13/2021 0947  Last data filed at 7/13/2021 0910  Gross per 24 hour   Intake 838 ml   Output 2325 ml   Net -1487 ml     I/O last 3 completed shifts: In: 0283 [P O :1740]  Out: 6183 [RSCPB:0051]    Not on tele         Physical Exam:  Vitals:    07/13/21 0600 07/13/21 0754 07/13/21 0824 07/13/21 0920   BP:  120/65 (!) 89/50 102/51   BP Location:       Pulse:   88    Resp:   16    Temp:   97 9 °F (36 6 °C)    TempSrc:       SpO2:   96%    Weight: 96 8 kg (213 lb 6 5 oz)      Height:           GEN: Royce L Free appears well, alert and oriented x 3, pleasant and cooperative   HEENT: pupils equal, round, and reactive to light; extraocular muscles intact  NECK: supple, no carotid bruits   HEART: regular rhythm, normal S1 and S2, no murmurs, clicks, gallops or rubs, JVP is flat   LUNGS: clear to auscultation bilaterally; no wheezes, rales, or rhonchi   ABDOMEN: normal bowel sounds, soft, no tenderness, no distention  EXTREMITIES: Warm, pulses intact,; no clubbing, cyanosis, or edema  NEURO: no focal findings   SKIN: normal without suspicious lesions on exposed skin      Current Facility-Administered Medications:     acetaminophen (TYLENOL) tablet 975 mg, 975 mg, Oral, Q8H [facility], [facility], OMAIRA, 975 mg at 07/13/21 0558    aspirin chewable tablet 81 mg, 81 mg, Oral, Daily, Keila Mckeon MD, 81 mg at 07/13/21 0802    atorvastatin (LIPITOR) tablet 80 mg, 80 mg, Oral, Daily With Dinner, Keila Mckeon MD, 80 mg at 07/12/21 1749    clopidogrel (PLAVIX) tablet 75 mg, 75 mg, Oral, Daily, Keila Mckeon MD, 75 mg at 07/13/21 0803    docusate sodium (COLACE) capsule 100 mg, 100 mg, Oral, BID, Keila Mckeon MD, 100 mg at 07/12/21 1749    ferrous sulfate tablet 325 mg, 325 mg, Oral, BID With Meals, Keila Mckeon MD, 325 mg at 07/13/21 0802    furosemide (LASIX) tablet 40 mg, 40 mg, Oral, Daily, Néstor Brown PA-C, 40 mg at 07/13/21 0803    gabapentin (NEURONTIN) capsule 200 mg, 200 mg, Oral, TID, Clarbiel Carbo de Mehreen Mcbride MD, 200 mg at 07/13/21 0802    heparin (porcine) subcutaneous injection 5,000 Units, 5,000 Units, Subcutaneous, Q8H [address], 5,000 Units at 07/13/21 0558 **AND** [COMPLETED] Platelet count, , , Once, Darryn Bates MD    lidocaine (LIDODERM) 5 % patch 2 patch, 2 patch, Topical, Daily, Malia Frey MD, 2 patch at 07/13/21 0804    losartan (COZAAR) tablet 12 5 mg, 12 5 mg, Oral, Daily, Oswald Farley PA-C, 12 5 mg at 07/13/21 0803    methocarbamol (ROBAXIN) tablet 500 mg, 500 mg, Oral, Q6H [address], Darryn Bates MD, 500 mg at 07/13/21 0557    metoprolol succinate (TOPROL-XL) 24 hr tablet 12 5 mg, 12 5 mg, Oral, Daily, Malia Frey MD, 12 5 mg at 07/13/21 0803    ondansetron (ZOFRAN) injection 4 mg, 4 mg, Intravenous, Q6H PRN, Malia Frey MD    oxyCODONE (ROXICODONE) IR tablet 2 5 mg, 2 5 mg, Oral, Q4H PRN, OMAIRA Rose    oxyCODONE (ROXICODONE) IR tablet 7 5 mg, 7 5 mg, Oral, Q4H PRN, OMAIRA Rose, 7 5 mg at 07/12/21 2122    pantoprazole (PROTONIX) EC tablet 40 mg, 40 mg, Oral, Early Morning, Malia Frey MD, 40 mg at 07/13/21 0558    tamsulosin (FLOMAX) capsule 0 4 mg, 0 4 mg, Oral, Daily With Dinner, Malia Frey MD, 0 4 mg at 07/12/21 1749      Labs & Results:        Results from last 7 days   Lab Units 07/12/21  0344 07/11/21  0436 07/10/21  1540 07/10/21  0527   WBC Thousand/uL 5 66 6 85  --  7 87   HEMOGLOBIN g/dL 8 6* 8 8* 9 9* 9 7*   HEMATOCRIT % 28 6* 29 1* 32 8* 32 2*   PLATELETS Thousands/uL 169 176 194 206         Results from last 7 days   Lab Units 07/12/21  0344 07/11/21  0436 07/10/21  0527 07/07/21  1050   POTASSIUM mmol/L 3 8 4 0 4 3 4 4   CHLORIDE mmol/L 107 106 111* 100   CO2 mmol/L 22 20* 20* 19*   BUN mg/dL 20 17 13 29*   CREATININE mg/dL 1 11 1 17 1 03 1 28   CALCIUM mg/dL 8 4 8 3 9 0 9 4   ALK PHOS U/L  --   --   --  60   ALT U/L  --   --   --  31   AST U/L  --   --   --  21     Results from last 7 days   Lab Units 07/08/21  0401 07/07/21  1305 07/07/21  1050   INR  1 11 1 05 1 04         Counseling / Coordination of Care  Total floor / unit time spent today 20 minutes  Greater than 50% of total time was spent with the patient and / or family counseling and / or coordination of care  A description of the counseling / coordination of care: 20  Thank you for the opportunity to participate in the care of this patient  Katelynn Rowell

## 2021-07-13 NOTE — NURSING NOTE
Patient discharged to home with son as escort  Pt left via personal motorized wheelchair  Discharge instructions reviewed with patient  Prescriptions sent to [facility] pharmacy in [facility] with patient

## 2021-07-13 NOTE — PROGRESS NOTES
Vascular Nurse Navigator Post Op Education    Met with patient to introduce myself as Vascular Nurse Navigator and explained my role  Patient is appropriate and accepting to education  Patient was educated with Review of written materials provided, Teachback, Explanation, Demonstration and Question & Answer on expectations of post op care and recovery on Right Femoral Endarterectomy  Patient is a former smoker, quit 20 years ago, as such Smoking effects on the lungs, tobacco triggers and Smoking cessation was reviewed  Education provided to patient on infection prevention, activity limitations, when to call the office, importance of follow up, and incisional care  Discharge instruction handout provided to patient to review  Provided patient with a pack of disposable washcloths, a pack of guaze, and a bottle of chlorhexidine for incision care at home

## 2021-07-13 NOTE — PROGRESS NOTES
Progress Note - Acute Pain Service    Da Rene 76 y o  male MRN: [id number]  Unit/Bed#: [facility] 511-01 Encounter: [id number]      Assessment:   Principal Problem:    Ischemic leg pain  Active Problems:    Chronic combined systolic and diastolic CHF (congestive heart failure) (HCC)    S/P AVR    Anemia    Prostate cancer (HCC)    CAD (coronary artery disease)    Fever    Hypotension    Urinary retention    Da Rene is a 76 y o  male  Admitted on 7/7/21 for right leg pain who underwent right common and superficial femoral artery endarterectomy on 7/9/21  Plan:    Continue Tylenol 975 mg p o  q 8 hours scheduled   Continue oxycodone 2 5 mg p o  q 4 hours p r n  moderate pain   Suggest change oxycodone 5 mg p o  q 4 hours p r n  severe pain   Continue gabapentin 200 mg p o  t i d  scheduled   Suggest discontinue Robaxin   Continue lidocaine patch for up to 12 hours daily   Continue bowel regimen to avoid opioid induced constipation   At discharge, suggest continue Tylenol, gabapentin, lidocaine patch in bowel regimen as currently ordered   At discharge, suggest oxycodone 2 5 mg p o  q 4 hours p r n  moderate to severe pain for 3-5 days  APS will sign off at this time  Thank you for the consult  All opioids and other analgesics to be written at discretion of primary team  Please contact Acute Pain Service - [facility] via iSnap from [phone] with additional questions or concerns  See Gabrielle or Feliciano for additional contacts and after hours information  Pain History  Current pain location(s):   No pain  Pain Scale:    No pain  Quality:  No pain  24 hour history:  Patient's pain improved significantly over past 24 hours  Has used only 7 5 mg p o  oxycodone  Opioid requirement previous 24 hours:   Oxycodone 7 5 mg p o      Meds/Allergies   all current active meds have been reviewed, current meds:   Current Facility-Administered Medications   Medication Dose Route Frequency    acetaminophen (TYLENOL) tablet 975 mg  975 mg Oral Q8H [address]    aspirin chewable tablet 81 mg  81 mg Oral Daily    atorvastatin (LIPITOR) tablet 80 mg  80 mg Oral Daily With Dinner    clopidogrel (PLAVIX) tablet 75 mg  75 mg Oral Daily    docusate sodium (COLACE) capsule 100 mg  100 mg Oral BID    ferrous sulfate tablet 325 mg  325 mg Oral BID With Meals    furosemide (LASIX) tablet 40 mg  40 mg Oral Daily    gabapentin (NEURONTIN) capsule 200 mg  200 mg Oral TID    heparin (porcine) subcutaneous injection 5,000 Units  5,000 Units Subcutaneous Q8H [address]    lidocaine (LIDODERM) 5 % patch 2 patch  2 patch Topical Daily    losartan (COZAAR) tablet 12 5 mg  12 5 mg Oral Daily    methocarbamol (ROBAXIN) tablet 500 mg  500 mg Oral Q6H [address]    metoprolol succinate (TOPROL-XL) 24 hr tablet 12 5 mg  12 5 mg Oral Daily    ondansetron (ZOFRAN) injection 4 mg  4 mg Intravenous Q6H PRN    oxyCODONE (ROXICODONE) IR tablet 2 5 mg  2 5 mg Oral Q4H PRN    oxyCODONE (ROXICODONE) IR tablet 7 5 mg  7 5 mg Oral Q4H PRN    pantoprazole (PROTONIX) EC tablet 40 mg  40 mg Oral Early Morning    tamsulosin (FLOMAX) capsule 0 4 mg  0 4 mg Oral Daily With Dinner    and PTA meds:   Prior to Admission Medications   Prescriptions Last Dose Informant Patient Reported? Taking?   abiraterone (ZYTIGA) 250 mg tablet   Yes No   Sig: Take by mouth   atorvastatin (LIPITOR) 80 mg tablet   No No   Sig: Take 1 tablet (80 mg total) by mouth daily with dinner   ciprofloxacin (CILOXAN) 0 3 % ophthalmic solution   No No   Sig: Administer 1 drop to both eyes every 2 (two) hours   clopidogrel (PLAVIX) 75 mg tablet   Yes No   Sig: TAKE ONE TABLET BY MOUTH DAILY AS BLOOD THINNER   ferrous sulfate 325 (65 Fe) mg tablet   Yes No   Sig: Take 325 mg by mouth 2 (two) times a day with meals   furosemide (LASIX) 40 mg tablet   No No   Sig: Take 1 tablet (40 mg total) by mouth daily   leuprolide (Eligard) 22 5 mg   Yes No   Si  5MG/1 SYRINGE UNDER THE SKIN ONE TIME AS DIRECTED EVERY 3 MONTHS   loperamide (IMODIUM) 2 mg capsule   Yes No   Sig: Take 4 mg by mouth 4 (four) times a day as needed for diarrhea   metoprolol succinate (TOPROL-XL) 25 mg 24 hr tablet   No No   Sig: Take 0 5 tablets (12 5 mg total) by mouth daily   omeprazole (PriLOSEC) 20 mg delayed release capsule   Yes No   Sig: Take 20 mg by mouth daily   tamsulosin (FLOMAX) 0 4 mg   Yes No   Si 4 mg   traMADol (ULTRAM) 50 mg tablet   Yes No   Sig: Take 50 mg by mouth every 6 (six) hours as needed for moderate pain   Patient not taking: Reported on 2021      Facility-Administered Medications: None       No Known Allergies    Objective     Temp:  [97 8 °F (36 6 °C)-98 4 °F (36 9 °C)] 97 9 °F (36 6 °C)  HR:  [73-95] 88  Resp:  [16-18] 16  BP: ()/(50-65) 102/51    Physical Exam  Vitals and nursing note reviewed  Constitutional:       General: He is awake  He is not in acute distress  Skin:     General: Skin is warm and dry  Neurological:      Mental Status: He is alert and oriented to person, place, and time  GCS: GCS eye subscore is 4  GCS verbal subscore is 5  GCS motor subscore is 6  Psychiatric:         Behavior: Behavior is cooperative  Lab Results:   Results from last 7 days   Lab Units 21  0344   WBC Thousand/uL 5 66   HEMOGLOBIN g/dL 8 6*   HEMATOCRIT % 28 6*   PLATELETS Thousands/uL 169      Results from last 7 days   Lab Units 21  0344 21  1050   POTASSIUM mmol/L 3 8 4 4   CHLORIDE mmol/L 107 100   CO2 mmol/L 22 19*   BUN mg/dL 20 29*   CREATININE mg/dL 1 11 1 28   CALCIUM mg/dL 8 4 9 4   ALK PHOS U/L  --  60   ALT U/L  --  31   AST U/L  --  21       Imaging Studies: I have personally reviewed pertinent reports  EKG, Pathology, and Other Studies: I have personally reviewed pertinent reports  Counseling / Coordination of Care  Total floor / unit time spent today 30 minutes   Greater than 50% of total time was spent with the patient and / or family counseling and / or coordination of care  A description of the counseling / coordination of care:  Patient interview, physical examination, review of medical record, review of imaging and laboratory data, development of pain management plan, discussion of pain management plan with patient and primary service  Please note that the APS provides consultative services regarding pain management only  With the exception of ketamine and epidural infusions and except when indicated, final decisions regarding starting or changing doses of analgesic medications are at the discretion of the consulting service  Off hours consultation and/or medication management is generally not available      La Nena Ash PA-C  Acute Pain Service

## 2021-07-13 NOTE — PLAN OF CARE
Problem: MOBILITY - ADULT  Goal: Maintain or return to baseline ADL function  Description: INTERVENTIONS:  -  Assess patient's ability to carry out ADLs; assess patient's baseline for ADL function and identify physical deficits which impact ability to perform ADLs (bathing, care of mouth/teeth, toileting, grooming, dressing, etc )  - Assess/evaluate cause of self-care deficits   - Assess range of motion  - Assess patient's mobility; develop plan if impaired  - Assess patient's need for assistive devices and provide as appropriate  - Encourage maximum independence but intervene and supervise when necessary  - Involve family in performance of ADLs  - Assess for home care needs following discharge   - Consider OT consult to assist with ADL evaluation and planning for discharge  - Provide patient education as appropriate  Outcome: Progressing  Goal: Maintains/Returns to pre admission functional level  Description: INTERVENTIONS:  - Perform BMAT or MOVE assessment daily    - Set and communicate daily mobility goal to care team and patient/family/caregiver  - Collaborate with rehabilitation services on mobility goals if consulted  - Perform Range of Motion  times a day  - Reposition patient every  hours    - Dangle patient  times a day  - Stand patient times a day  - Ambulate patient  times a day  - Out of bed to chair  times a day   - Out of bed for meals  times a day  - Out of bed for toileting  - Record patient progress and toleration of activity level   Outcome: Progressing     Problem: PAIN - ADULT  Goal: Verbalizes/displays adequate comfort level or baseline comfort level  Description: Interventions:  - Encourage patient to monitor pain and request assistance  - Assess pain using appropriate pain scale  - Administer analgesics based on type and severity of pain and evaluate response  - Implement non-pharmacological measures as appropriate and evaluate response  - Consider cultural and social influences on pain and pain management  - Notify physician/advanced practitioner if interventions unsuccessful or patient reports new pain  Outcome: Progressing     Problem: INFECTION - ADULT  Goal: Absence or prevention of progression during hospitalization  Description: INTERVENTIONS:  - Assess and monitor for signs and symptoms of infection  - Monitor lab/diagnostic results  - Monitor all insertion sites, i e  indwelling lines, tubes, and drains  - Monitor endotracheal if appropriate and nasal secretions for changes in amount and color  - [place] appropriate cooling/warming therapies per order  - Administer medications as ordered  - Instruct and encourage patient and family to use good hand hygiene technique  - Identify and instruct in appropriate isolation precautions for identified infection/condition  Outcome: Progressing     Problem: SAFETY ADULT  Goal: Patient will remain free of falls  Description: INTERVENTIONS:  - Educate patient/family on patient safety including physical limitations  - Instruct patient to call for assistance with activity   - Consult OT/PT to assist with strengthening/mobility   - Keep Call bell within reach  - Keep bed low and locked with side rails adjusted as appropriate  - Keep care items and personal belongings within reach  - Initiate and maintain comfort rounds  - Make Fall Risk Sign visible to staff  - Offer Toileting every  Hours, in advance of need  - Initiate/Maintain alarm  - Obtain necessary fall risk management equipment:   - Apply yellow socks and bracelet for high fall risk patients  - Consider moving patient to room near nurses station  Outcome: Progressing     Problem: DISCHARGE PLANNING  Goal: Discharge to home or other facility with appropriate resources  Description: INTERVENTIONS:  - Identify barriers to discharge w/patient and caregiver  - Arrange for needed discharge resources and transportation as appropriate  - Identify discharge learning needs (meds, wound care, etc )  - Arrange for interpretive services to assist at discharge as needed  - Refer to Case Management Department for coordinating discharge planning if the patient needs post-hospital services based on physician/advanced practitioner order or complex needs related to functional status, cognitive ability, or social support system  Outcome: Progressing     Problem: Potential for Falls  Goal: Patient will remain free of falls  Description: INTERVENTIONS:  - Educate patient/family on patient safety including physical limitations  - Instruct patient to call for assistance with activity   - Consult OT/PT to assist with strengthening/mobility   - Keep Call bell within reach  - Keep bed low and locked with side rails adjusted as appropriate  - Keep care items and personal belongings within reach  - Initiate and maintain comfort rounds  - Make Fall Risk Sign visible to staff  - Offer Toileting every  Hours, in advance of need  - Initiate/Maintain alarm  - Obtain necessary fall risk management equipment:   - Apply yellow socks and bracelet for high fall risk patients  - Consider moving patient to room near nurses station  Outcome: Progressing     Problem: Prexisting or High Potential for Compromised Skin Integrity  Goal: Skin integrity is maintained or improved  Description: INTERVENTIONS:  - Identify patients at risk for skin breakdown  - Assess and monitor skin integrity  - Assess and monitor nutrition and hydration status  - Monitor labs   - Assess for incontinence   - Turn and reposition patient  - Assist with mobility/ambulation  - Relieve pressure over bony prominences  - Avoid friction and shearing  - Provide appropriate hygiene as needed including keeping skin clean and dry  - Evaluate need for skin moisturizer/barrier cream  - Collaborate with interdisciplinary team   - Patient/family teaching  - Consider wound care consult   Outcome: Progressing

## 2021-07-14 LAB
DME PARACHUTE DELIVERY DATE REQUESTED: NORMAL
DME PARACHUTE ITEM DESCRIPTION: NORMAL
DME PARACHUTE ORDER STATUS: NORMAL
DME PARACHUTE SUPPLIER NAME: NORMAL
DME PARACHUTE SUPPLIER PHONE: NORMAL

## 2021-07-14 NOTE — ASSESSMENT & PLAN NOTE
S/p PCI to circumflex/RCA 2001  Most recent [facility] 6/2021 revealing diffuse moderate prox and mid disease/ISR, severe OM lesion and total mid LAD  On asa/plavix/statin/bb  Pt is cp free

## 2021-07-14 NOTE — UTILIZATION REVIEW
Notification of Discharge   This is a Notification of Discharge from our facility [address]  Please be advised that this patient has been discharge from our facility  Below you will find the admission and discharge date and time including the patients disposition  UTILIZATION REVIEW CONTACT:  Toña Figueroa  Utilization   Network Utilization Review Department  Phone: [phone] x carefully listen to the prompts  All voicemails are confidential   Email: [email or web address]     PHYSICIAN ADVISORY SERVICES:  FOR LTEH-MS-RUGB REVIEW - MEDICAL NECESSITY DENIAL  Phone: [phone]  Fax: [phone]  Email: [email or web address]     PRESENTATION DATE: 7/7/2021  9:48 AM  OBERVATION ADMISSION DATE:   INPATIENT ADMISSION DATE: 7/7/21  1:55 PM   DISCHARGE DATE: 7/13/2021  3:57 PM  DISPOSITION: Home with [place] with [address] [place] [address] Road INFORMATION:  Send all requests for admission clinical reviews, approved or denied determinations and any other requests to dedicated fax number below belonging to the campus where the patient is receiving treatment   List of dedicated fax numbers:  [address] DENIALS (Administrative/Medical Necessity) [phone]   [address] [place] [address] (Maternity/NICU/Pediatrics) [phone]   Dayne Chávez [phone]   Abi Truong [phone]   Abiodun Donald [phone]   [facility] [address] [place] [address] [phone]   [facility]  [phone]   [address] [facility] [address]  [facility] [facility][note 2] [address] [facility] [phone]

## 2021-07-14 NOTE — ASSESSMENT & PLAN NOTE
· S/P  Chemotherapy and radiation in [place]  · Patient was receiving chemo every 4 weeks in [place], last chemo 4 weeks ago  · Patient wishes to establish care with Oncology at [facility]

## 2021-07-14 NOTE — ASSESSMENT & PLAN NOTE
Wt Readings from Last 3 Encounters:   07/13/21 96 8 kg (213 lb 6 5 oz)   07/06/21 97 3 kg (214 lb 8 oz)   07/01/21 98 8 kg (217 lb 12 8 oz)     · TTE 06/23/2021: LVEF 20%; underlying ICM  · Cardiology following  · Patient is currently euvolemic  · Cont on lasix, metoprolol  Initiated on low dose entresto per cardiology however bp unable to tolerate thus dcd   Initated on losartan now,   · Holding aldactone  · Cont to monitor fluid status  · Is supposed to be on lifevest but pt is non compliant, apparently he sent it back because if was loose fitting

## 2021-07-14 NOTE — DISCHARGE SUMMARY
[facility]  Discharge- Lyndsay Cline Free 1945, 76 y o  male MRN: [id number]  Unit/Bed#: [facility] 224-97 Encounter: [id number]  Primary Care Provider: Stacy Mondragon MD   Date and time admitted to hospital: 7/7/2021  9:48 AM       Urinary retention  Assessment & Plan  Moser removed   Cont urinary retention protocol        Hypotension  Assessment & Plan  Chronically runs low as confirmed per cardiology likely due to low ef HF  entresto discontinued  Initiated on losartan today  Bp holding parameters placed on cardiac meds  May use prn albumin infusion if bp <90 or symptomatic        Fever  Assessment & Plan  X 1 episode on 7/10 likely post op related, no reoccurence  No leukocytosis, non toxic appearing  Low  procalcitonin        CAD (coronary artery disease)  Assessment & Plan  S/p PCI to circumflex/RCA 2001  Most recent [facility] 6/2021 revealing diffuse moderate prox and mid disease/ISR, severe OM lesion and total mid LAD  On asa/plavix/statin/bb  Pt is cp free     Prostate cancer ([facility] [address] )  Assessment & Plan  · S/P  Chemotherapy and radiation in [place]  · Patient was receiving chemo every 4 weeks in [place], last chemo 4 weeks ago  · Patient wishes to establish care with Oncology at [facility]  Chronic,  H/H stable  Cont to monitor  Transfuse if < 8 given hx of CAD     S/P AVR  Assessment & Plan  AS status post TAVR in [place] on 6/14/21  No sign of volume overload     Chronic combined systolic and diastolic CHF (congestive heart failure) (HCC)  Assessment & Plan      Wt Readings from Last 3 Encounters:   07/13/21 96 8 kg (213 lb 6 5 oz)   07/06/21 97 3 kg (214 lb 8 oz)   07/01/21 98 8 kg (217 lb 12 8 oz)      · TTE 06/23/2021: LVEF 20%; underlying ICM  · Cardiology following  · Patient is currently euvolemic  · Cont on lasix, metoprolol  Initiated on low dose entresto per cardiology however bp unable to tolerate thus dcd   Initated on losartan now, · Holding aldactone  · Cont to monitor fluid status  · Is supposed to be on lifevest but pt is non compliant, apparently he sent it back because if was loose fitting        * Ischemic leg pain  Assessment & Plan  · Patient with history of ischemic cardiomyopathy, chronic CHF, presented with right lower leg pain  · LEAD reveals >75% stenosis of the R CFA/SFA  · Vascular surgery following  · CTA runoff: High-grade distal right common femoral artery, proximal profunda femoris artery, and multifocal superficial femoral artery stenoses with probable three-vessel runoff to the foot  Distal runoff is limited due to dense calcifications  · S/p femoral endarterectomy 07/09  · On asa/plavix/statin  · Post op KARIL improved  · C/o of neuropathic related pain RT heel; d/w vascular possibly chemo induced; started on gabapentin  · S/p XR of knee DJD arthritis  · APS following for pain management                 Discharging Physician / Practitioner: Liana Lucia MD  PCP: Melquiades Duncan MD  Admission Date:       Admission Orders (From admission, onward)              Ordered          07/07/21 1355   Inpatient Admission  Once                       Discharge Date:07/13/2021         Medical Problems            Resolved Problems  Date Reviewed: 7/14/2021           None                     Consultations During Hospital Stay:  · Acute pain service, Cardiology, vascular surgery      Procedures Performed:   · CT angiogram I heavily calcified plaque at the common femoral bifurcation extending into proximal common femoral artery and proximal profundus and superficial femoral artery  · Cardiology clearance for surgery  · CTA runoff: High-grade distal right common femoral artery, proximal profunda femoris artery, and multifocal superficial femoral artery stenoses with probable three-vessel runoff to the foot   Distal runoff is limited due to dense calcifications  · Status post femoral endarterectomy on   · 07/09 : VAS KARLI single level:  RIGHT LOWER LIMB:  ABNORMAL:  Ankle/Brachial index:  0 69, which is unreliable, Prior: Unobtainable  PVR/ PPG tracings:  Dampened     LEFT LOWER LIMB:  Ankle/Brachial index:  1 26 which is in the normal range, Prior 1 15  PVR/ PPG tracings are normal      XR knee 1 or 2 vw:   Mild tricompartmental degenerative arthritis  Significant Findings / Test Results:   · As above      Incidental Findings:   · None      Test Results Pending at Discharge (will require follow up): · None      Outpatient Tests Requested:  · None      Complications:  None      Reason for Admission:  Ischemic leg pain     Hospital Course:      · Monique Hollins is a 76 y o  male patient who originally presented to the hospital on 7/7/2021 due to ischemic leg pain  Patient has a history of prostate cancer, ischemic cardiomyopathy, anemia, status post AVR chronic combined systolic and diastolic CHF  Leads revealed greater than 75% of stenosis of right CFA/SFA  Vascular surgery started patient on heparin drip  He went  Status post femoral endarterectomy on 07/09  He is having pain right ankle which might be due to chemotherapy per Vascular surgery  He was placed on gabapentin  He will need to see podiatry outpatient and vascular surgery as well   He is on aspirin/plavix and statin             Please see above list of diagnoses and related plan for additional information       Condition at Discharge: stable      Discharge Day Visit / Exam:      * Please refer to separate progress note for these details *     Discussion with Family: updated      Discharge instructions/Information to patient and family:   See after visit summary for information provided to patient and family        Provisions for Follow-Up Care:  See after visit summary for information related to follow-up care and any pertinent home health orders        Disposition:      Home with VNA Services (Reminder: Complete face to face encounter)     For Discharges to [place] [address] Affiliated SNF:   · Not Applicable to this Patient - Not Applicable to this Patient     Planned Readmission: no      Discharge Statement:  I spent 45 minutes discharging the patient  This time was spent on the day of discharge  I had direct contact with the patient on the day of discharge  Greater than 50% of the total time was spent examining patient, answering all patient questions, arranging and discussing plan of care with patient as well as directly providing post-discharge instructions    Additional time then spent on discharge activities      Discharge Medications:  See after visit summary for reconciled discharge medications provided to patient and family        ** Please Note: This note has been constructed using a voice recognition system **

## 2021-07-14 NOTE — PROGRESS NOTES
[address] [facility]  Progress Note Nonnie Hands Free 1945, 76 y o  male MRN: [id number]  Unit/Bed#: [facility] 752-34 Encounter: [id number]  Primary Care Provider: Jonh Bryant MD   Date and time admitted to hospital: 7/7/2021  9:48 AM    Urinary retention  Assessment & Plan  Moser removed   Cont urinary retention protocol      Hypotension  Assessment & Plan  Chronically runs low as confirmed per cardiology likely due to low ef HF  entresto discontinued  Initiated on losartan today  Bp holding parameters placed on cardiac meds  May use prn albumin infusion if bp <90 or symptomatic      Fever  Assessment & Plan  X 1 episode on 7/10 likely post op related, no reoccurence  No leukocytosis, non toxic appearing  Low  procalcitonin      CAD (coronary artery disease)  Assessment & Plan  S/p PCI to circumflex/RCA 2001  Most recent [facility] 6/2021 revealing diffuse moderate prox and mid disease/ISR, severe OM lesion and total mid LAD  On asa/plavix/statin/bb  Pt is cp free    Prostate cancer ([facility] [address] )  Assessment & Plan  · S/P  Chemotherapy and radiation in [place]  · Patient was receiving chemo every 4 weeks in [place], last chemo 4 weeks ago  · Patient wishes to establish care with Oncology at [address] [facility]  Chronic,  H/H stable  Cont to monitor  Transfuse if < 8 given hx of CAD    S/P AVR  Assessment & Plan  AS status post TAVR in [place] on 6/14/21  No sign of volume overload    Chronic combined systolic and diastolic CHF (congestive heart failure) (HCC)  Assessment & Plan  Wt Readings from Last 3 Encounters:   07/13/21 96 8 kg (213 lb 6 5 oz)   07/06/21 97 3 kg (214 lb 8 oz)   07/01/21 98 8 kg (217 lb 12 8 oz)     · TTE 06/23/2021: LVEF 20%; underlying ICM  · Cardiology following  · Patient is currently euvolemic  · Cont on lasix, metoprolol  Initiated on low dose entresto per cardiology however bp unable to tolerate thus dcd   Initated on losartan now,   · Holding aldactone  · Cont to monitor fluid status  · Is supposed to be on lifevest but pt is non compliant, apparently he sent it back because if was loose fitting      * Ischemic leg pain  Assessment & Plan  · Patient with history of ischemic cardiomyopathy, chronic CHF, presented with right lower leg pain  · LEAD reveals >75% stenosis of the R CFA/SFA  · Vascular surgery following  · CTA runoff: High-grade distal right common femoral artery, proximal profunda femoris artery, and multifocal superficial femoral artery stenoses with probable three-vessel runoff to the foot  Distal runoff is limited due to dense calcifications  · S/p femoral endarterectomy 07/09  · On asa/plavix/statin  · Post op KARLI improved  · C/o of neuropathic related pain RT heel; d/w vascular possibly chemo induced; started on gabapentin  · S/p XR of knee DJD arthritis  · APS following for pain management             Discharging Physician / Practitioner: Eric Franco MD  PCP: Mumtaz Brizuela MD  Admission Date:   Admission Orders (From admission, onward)     Ordered        07/07/21 1355  Inpatient Admission  Once                   Discharge Date:07/13/2021    Medical Problems     Resolved Problems  Date Reviewed: 7/14/2021    None                Consultations During Hospital Stay:  · Acute pain service, Cardiology, vascular surgery     Procedures Performed:   · CT angiogram I heavily calcified plaque at the common femoral bifurcation extending into proximal common femoral artery and proximal profundus and superficial femoral artery  · Cardiology clearance for surgery  · CTA runoff: High-grade distal right common femoral artery, proximal profunda femoris artery, and multifocal superficial femoral artery stenoses with probable three-vessel runoff to the foot   Distal runoff is limited due to dense calcifications  · Status post femoral endarterectomy on   · 07/09 : VAS KARLI single level:  RIGHT LOWER LIMB:  ABNORMAL:  Ankle/Brachial index:  0 69, which is unreliable, Prior: Unobtainable  PVR/ PPG tracings:  Dampened     LEFT LOWER LIMB:  Ankle/Brachial index:  1 26 which is in the normal range, Prior 1 15  PVR/ PPG tracings are normal     XR knee 1 or 2 vw:   Mild tricompartmental degenerative arthritis  Significant Findings / Test Results:   · As above     Incidental Findings:   · None     Test Results Pending at Discharge (will require follow up): · None      Outpatient Tests Requested:  · None     Complications:  None     Reason for Admission:  Ischemic leg pain    Hospital Course:     · Tiffany Alvarez is a 76 y o  male patient who originally presented to the hospital on 7/7/2021 due to ischemic leg pain  Patient has a history of prostate cancer, ischemic cardiomyopathy, anemia, status post AVR chronic combined systolic and diastolic CHF  Leads revealed greater than 75% of stenosis of right CFA/SFA  Vascular surgery started patient on heparin drip  He went  Status post femoral endarterectomy on 07/09  He is having pain right ankle which might be due to chemotherapy per Vascular surgery  He was placed on gabapentin  He will need to see podiatry outpatient and vascular surgery as well  He is on aspirin/plavix and statin  Please see above list of diagnoses and related plan for additional information  Condition at Discharge: stable     Discharge Day Visit / Exam:     * Please refer to separate progress note for these details *    Discussion with Family: updated     Discharge instructions/Information to patient and family:   See after visit summary for information provided to patient and family  Provisions for Follow-Up Care:  See after visit summary for information related to follow-up care and any pertinent home health orders        Disposition:     Home with VNA Services (Reminder: Complete face to face encounter)    For Discharges to [facility]:   · Not Applicable to this Patient - Not Applicable to this Patient    Planned Readmission: no      Discharge Statement:  I spent 45 minutes discharging the patient  This time was spent on the day of discharge  I had direct contact with the patient on the day of discharge  Greater than 50% of the total time was spent examining patient, answering all patient questions, arranging and discussing plan of care with patient as well as directly providing post-discharge instructions  Additional time then spent on discharge activities  Discharge Medications:  See after visit summary for reconciled discharge medications provided to patient and family        ** Please Note: This note has been constructed using a voice recognition system **

## 2021-07-15 ENCOUNTER — TELEPHONE (OUTPATIENT)
Dept: VASCULAR SURGERY | Facility: CLINIC | Age: 76
End: 2021-07-15

## 2021-07-15 NOTE — TELEPHONE ENCOUNTER
Vascular Nurse Navigator Post Op Call    Procedure: ENDARTERECTOMY ARTERIAL FEMORAL (Right)    Date of Procedure: 7/9/21    Surgeon:    * Amando, DO - Primary     * Janyce Leyden, [address], DO - Fellow    Discharge Date: 7/13/21    Discharge Disposition: Home with [facility]    Leg Weakness?: No    Leg Swelling?: Yes, see below    Leg Numbness?: No    Chest Pain?: No    Shortness of Breath?: No    Orthopnea?: No    Anticoagulation pt was discharged on post op?: Aspirin and Clopidogrel (Plavix)    Statin pt was discharged on post op?:  Lipitor (atorvastatin)    Bleeding?: No    Uncontrolled Pain?: See below    Incision Concerns?: No    Fever or Chills?: No      Reviewed discharge instructions and incision care with patient  NEXT OFFICE VISIT SCHEDULED:  7/19/21 at 4:30 pm with Khushbu Lawson PA-C at [facility] Confirmed?: Yes - he stated that he will have his daughter or a friend take him to appointment      Any further questions/concerns? Patient stated that he is having a lot of pain in his right foot  He stated that his right foot is swollen and painful and this is the same as when he was in the hospital   He stated that he is having a hard time walking on his right foot  He stated that he gets the pain when he steps on his foot and his caused him to have "a lot of pain that is greater than a 10" and describes his pain a pounding sensation  He stated that he does not have pain at rest   He stated that his knee to ankle is swollen, but his ankle and foot is more swollen  He stated that he is elevating his leg when he is able to  Inquired about medications he is taking - Neurontin, oxycodone, tramadol  He stated that he has a nurse that comes in that fills envelopes with his pills and marks when he should take them on the envelopes    He stated he does not know what medications are int he envelopes as the nurse follows his discharge instructions and places them in the envelopes accordingly  He is not taking any extra medications other than what is in the envelope  Reviewed incision care with him - wash incision daily with soap and water and place a new piece of guaze in right groin  He stated that the nurse will be out again on Saturday  Informed him that I will send this information to the triage provider for further recommendations and contact him back with her recommendations  He was agreeable to same

## 2021-07-15 NOTE — TELEPHONE ENCOUNTER
This is likely reperfusion pain and swelling which is typical after a revascularization procedure  Please advise that if this pain worsens or he develops change in foot color/weakness or motor deficit he should call the office and an arterial duplex can be ordered

## 2021-07-16 ENCOUNTER — OFFICE VISIT (OUTPATIENT)
Dept: CARDIOLOGY CLINIC | Facility: CLINIC | Age: 76
End: 2021-07-16
Payer: COMMERCIAL

## 2021-07-16 VITALS
HEIGHT: 67 IN | SYSTOLIC BLOOD PRESSURE: 98 MMHG | HEART RATE: 95 BPM | BODY MASS INDEX: 34 KG/M2 | OXYGEN SATURATION: 97 % | WEIGHT: 216.6 LBS | DIASTOLIC BLOOD PRESSURE: 56 MMHG

## 2021-07-16 DIAGNOSIS — I50.22 CHRONIC HFREF (HEART FAILURE WITH REDUCED EJECTION FRACTION) (HCC): Primary | ICD-10-CM

## 2021-07-16 DIAGNOSIS — Z95.2 S/P TAVR (TRANSCATHETER AORTIC VALVE REPLACEMENT): ICD-10-CM

## 2021-07-16 DIAGNOSIS — I25.10 CORONARY ARTERY DISEASE INVOLVING NATIVE CORONARY ARTERY OF NATIVE HEART WITHOUT ANGINA PECTORIS: ICD-10-CM

## 2021-07-16 DIAGNOSIS — I73.9 PAD (PERIPHERAL ARTERY DISEASE) (HCC): ICD-10-CM

## 2021-07-16 PROCEDURE — 99214 OFFICE O/P EST MOD 30 MIN: CPT | Performed by: INTERNAL MEDICINE

## 2021-07-16 PROCEDURE — 1160F RVW MEDS BY RX/DR IN RCRD: CPT | Performed by: INTERNAL MEDICINE

## 2021-07-16 NOTE — TELEPHONE ENCOUNTER
Contacted patient to inform him of recommendations from Aneesh Garcia PA-C  Informed him to notify office of any change in color, temperature, weakness  Verbal understanding received  Informed him of follow up appointment on  7/19/21 at 4:30 pm at [place] [address] [address]

## 2021-07-16 NOTE — PROGRESS NOTES
Advanced Heart Failure Outpatient Progress Note - Cristóbal Rene 76 y o  male MRN: [id number]    Encounter: [id number]      Assessment/Plan:    Patient Active Problem List    Diagnosis Date Noted    Lower extremity pain 07/11/2021    Urinary retention 07/11/2021    CAD (coronary artery disease) 07/10/2021    Fever 07/10/2021    Hypotension 07/10/2021    Ischemic leg pain 07/07/2021    [facility] 07[facility]07/2021    Chronic midline low back pain 07/01/2021    Ischemic cardiomyopathy 07/01/2021    Chronic combined systolic and diastolic CHF (congestive heart failure) ([facility] [address] ) 06/22/2021    S/P AVR 06/22/2021    Anemia 06/22/2021     # RLE ischemia  LEAD with >75% stenosis of the CFA/SFA  s/p right femoral endarterectomy 7/9/21  Continues on ASA, Plavix, and statin      # Chronic HFrEF; LVEF 20%; LVIDd 5 9 cm; NYHA III; ACC/AHA Stage C  Etiology: ischemic +/- progression of valvular disease +/- chemotherapy (on abiraterone and leuprolide with unclear safety profile)  Examines WWP  Euvolemic  MAP stable  Will continue Metoprolol only for now until post op period    Will need to re-evaluate remainder of medical regimen/dosing  prior to discharge       TTE 06/14/2021 (at [facility] pre- and post-TAVR, per report): LVEF 20%  LVIDd 5 4 cm  "entire apex, mid and apical anterior septum, mid and apical inferior septum, mid and apical inferior wall, mid inferolateral wall, and mid lateral wall are akinetic " Trace MR and TR  Pre-TAVR: LVOT VTI 12 0 cm  JOSE 0 47 cm^2  Post: bioprosthetic AV valve present; OJSE 2 41 cm^2                  TTE 06/23/2021:   LVEF 20%  LVIDd 5 9 cm  Grade 2 DD  aneurysmal LV mid to apex "dyskinesis and aneurysmal deformity of the apical anterior, mid anteroseptal, apical inferior, apical septal, and apical wall(s) "   Normal RV size and RVSF  Mild MR  AV bioprosthesis with normal leaflet separation   Trace TR       Neurohormonal Blockade:  --Beta-Blocker: Metoprolol succinate 12 5 mg BID  --ACEi, ARB or ARNi: Entresto 24/26 mg BID -on hold; switched to losartan 12 5mg daily due to low blood pressure    (or SVR reduction)  --Aldosterone Receptor Blocker: Spironolactone 12 5 mg daily - on hold  --SGLT2-I:   --Diuretic: Lasix 40 mg daily     Sudden Cardiac Death Risk Reduction:  --ICD: EF 20%  Had LifeVest- he sent it back     Electrical Resynchronization:  -- LBBB QRSd 158 msec     Advanced Therapies (If appropriate): --Inotrope:  --LVAD/Transplant Candidacy:     Prostate cancer with bony metastases  Receiving chemotherapy through the [place]  Taking SQ leuprolide (Eligard) and abiraterone (Zytiga) - safety of abiraterone in patients with LVEF <50% not established per FDA  [place]-analysis of drug noted increased incidence and relative risk of CV toxicity      Coronary artery disease  Without active chest pain  S/pstenting to circumflex and RCA in 2001  [facility] 06/09/2021 (per documentation):"diffuse moderate prox and mid disease/ISR, severe OM lesion and total mid LAD "  Rx: aspirin, plavix, statin     Aortic stenosis  S/p TAVR (Evolut Pro+ 29mm) on 06/14/[facility]21 at [facility]       Hyperlipidemia     History of atrial tachycardia: s/p ablation at OSH in 2018  Benign prostatic hyperplasia      TODAY'S PLAN:  Continue furosemide, losartan and metoprolol  Follow up with vascular surgery  Daily weights  Salt and fluid restriction as above      HPI:   Jillian Thapa is a 42-year-old man with a PMH as aboe who presents for hospital follow-up      [facility] with chest pressure before chemotherapy treatment for prostate cancer  Sent for testing and noted to be in acute CHF exacerbation and was diuresed  Was then transferred to [facility] in [place]   TTE showed "severely reduced LVEF with LV apical aneurysm and septal/AW AK, RV normal, Vmax at 3 5 (however, it was read as moderate AS and preserved EF in 2019 so likely this is progression of AS in addition to interim AWMI) "  [facility] also done which revealed "diffuse moderate prox and mid disease/ISR, severe OM lesion and total mid LAD "     Transferred to [facility] on 06/13 for TAVR  TAVR completed on 06/14, and patient was discharged home on 06/15/2021  Started on metoprolol succinate this admission (tartrate discontinued)       Admitted to [facility] from 06/22 to 06/25/2021 after presenting with worsening SOB and LE edema for 3 days  WAS without discharge medications from [facility] admission for about 1 week (including Lasix)  NT-proBNP 5157  Cardiology was consulted, TTE was completed, and IV diuresis was started  Reduced LVEF was confirmed and aneurysmal wall motion of apical segments  Transitioned to PO diuretics on day of discharge  Started on Entresto and fit with LifeVest  Lost 10 lbs this admission       07/06/2021: Patient presents for hospital follow-up  Primary complaint today is of dizziness and right LE pain/numbeness and right foot wound  Reports decreased appetite and increased frustration with attempting to adhere to low sodium diet  Did receive "Living With Heart Failure" booklet during admission  Prior to admissions, often would eat canned pastas for his meals  Considering getting Meals of Wheels  Unclear if patient has been taking both tartrate and succinate since discharge  Not wearing LifeVest; dislikes wearing it and left device at home today  Has not been completing daily weights; does not have scale at home  Patient organizes his own meds; VNA in home 2 times weekly  Interval History:  7/16/2021: Patient is here for follow-up  Patient hospitalized 6/17/20July 7-13 due to right lower extremity ischemia  Underwent right femoral endarterectomy on June 9  Furosemide Entresto held prior to surgery due to low blood pressure  Eventually discharged on low-dose losartan 12 5 mg daily due to soft blood pressure  Furosemide 40 mg daily resumed    Patient reports right lower extremity pain and difficulty bearing weight on the right leg  Otherwise denies shortness of breath, chest pain or lightheadedness  Review of Systems   Constitutional: Negative for chills and fever  HENT: Negative for ear pain and sore throat  Eyes: Negative for pain and visual disturbance  Respiratory: Negative for cough, chest tightness and shortness of breath  Cardiovascular: Negative for chest pain, palpitations and leg swelling  Gastrointestinal: Negative for abdominal pain and vomiting  Genitourinary: Negative for dysuria and hematuria  Musculoskeletal: Negative for arthralgias and back pain  Skin: Negative for color change and rash  Neurological: Negative for dizziness, seizures, syncope and light-headedness  All other systems reviewed and are negative  Past Medical History:   Diagnosis Date    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    Coronary artery disease 2001    S/p stenting to circumflex and RCA    [facility] with [facility]14[facility]    High cholesterol     Prostate cancer (HCC)          No Known Allergies        Current Outpatient Medications:     abiraterone (ZYTIGA) 250 mg tablet, Take by mouth, Disp: , Rfl:     acetaminophen (TYLENOL) 325 mg tablet, Take 3 tablets (975 mg total) by mouth every 8 (eight) hours, Disp: 90 tablet, Rfl: 0    aspirin 81 mg chewable tablet, Chew 1 tablet (81 mg total) daily, Disp: 30 tablet, Rfl: 0    atorvastatin (LIPITOR) 80 mg tablet, Take 1 tablet (80 mg total) by mouth daily with dinner, Disp: 30 tablet, Rfl: 0    clopidogrel (PLAVIX) 75 mg tablet, TAKE ONE TABLET BY MOUTH DAILY AS BLOOD THINNER, Disp: , Rfl:     docusate sodium (COLACE) 100 mg capsule, Take 1 capsule (100 mg total) by mouth 2 (two) times a day, Disp: 10 capsule, Rfl: 0    ferrous sulfate 325 (65 Fe) mg tablet, Take 325 mg by mouth 2 (two) times a day with meals, Disp: , Rfl:     furosemide (LASIX) 40 mg tablet, Take 1 tablet (40 mg total) by mouth daily, Disp: 30 tablet, Rfl: 0   gabapentin (NEURONTIN) 100 mg capsule, Take 2 capsules (200 mg total) by mouth 3 (three) times a day, Disp: 90 capsule, Rfl: 0    leuprolide (Eligard) 22 5 mg, 22 5MG/1 SYRINGE UNDER THE SKIN ONE TIME AS DIRECTED EVERY 3 MONTHS, Disp: , Rfl:     lidocaine (LIDODERM) 5 %, Apply 2 patches topically daily Remove & Discard patch within 12 hours or as directed by MD, Disp: 15 patch, Rfl: 0    losartan (COZAAR) 25 mg tablet, Take 0 5 tablets (12 5 mg total) by mouth daily, Disp: 30 tablet, Rfl: 0    methocarbamol (ROBAXIN) 500 mg tablet, Take 1 tablet (500 mg total) by mouth every 6 (six) hours, Disp: 120 tablet, Rfl: 0    metoprolol succinate (TOPROL-XL) 25 mg 24 hr tablet, Take 0 5 tablets (12 5 mg total) by mouth daily, Disp: 30 tablet, Rfl: 0    omeprazole (PriLOSEC) 20 mg delayed release capsule, Take 20 mg by mouth daily, Disp: , Rfl:     oxyCODONE (ROXICODONE) 5 mg immediate release tablet, Take 0 5 tablets (2 5 mg total) by mouth every 4 (four) hours as needed for moderate pain for up to 10 daysMax Daily Amount: 15 mg, Disp: 30 tablet, Rfl: 0    oxyCODONE-acetaminophen (PERCOCET) 5-325 mg per tablet, Take 1 tablet by mouth every 4 (four) hours as needed for moderate pain for up to 10 daysMax Daily Amount: 6 tablets, Disp: 30 tablet, Rfl: 0    tamsulosin (FLOMAX) 0 4 mg, 0 4 mg, Disp: , Rfl:     traMADol (ULTRAM) 50 mg tablet, Take 50 mg by mouth every 6 (six) hours as needed for moderate pain (Patient not taking: Reported on 2021), Disp: , Rfl:     Social History     Socioeconomic History    Marital status:       Spouse name: Not on file    Number of children: 3    Years of education: Not on file    Highest education level: Not on file   Occupational History    Not on file   Tobacco Use    Smoking status: Former Smoker     Years: 20 00     Quit date: 2002     Years since quittin 0    Smokeless tobacco: Never Used   Vaping Use    Vaping Use: Never used   Substance and Sexual Activity    Alcohol use: Not Currently    Drug use: Not on file    Sexual activity: Not on file   Other Topics Concern    Not on file   Social History Narrative    Not on file     Social Determinants of Health     Financial Resource Strain:     Difficulty of Paying Living Expenses:    Food Insecurity:     Worried About Running Out of Food in the Last Year:     [address] in the Last Year:    Transportation Needs:     Lack of Transportation (Medical):  Lack of Transportation (Non-Medical):    Physical Activity:     Days of Exercise per Week:     Minutes of Exercise per Session:    Stress:     Feeling of Stress :    Social Connections:     Frequency of Communication with Friends and Family:     Frequency of Social Gatherings with Friends and Family:     Attends Confucianism Services:     Active Member of Clubs or Organizations:     Attends Club or Organization Meetings:     Marital Status:    Intimate Partner Violence:     Fear of Current or Ex-Partner:     Emotionally Abused:     Physically Abused:     Sexually Abused:        No family history on file  Physical Exam:    Vitals:   Vitals:    07/16/21 0952   BP: 98/56   Pulse: 95   SpO2: 97%       Physical Exam  Constitutional:       General: He is not in acute distress  Appearance: Normal appearance  HENT:      Head: Normocephalic and atraumatic  Mouth/Throat:      Mouth: Mucous membranes are moist    Eyes:      General: No scleral icterus  Extraocular Movements: Extraocular movements intact  Cardiovascular:      Rate and Rhythm: Normal rate and regular rhythm  Heart sounds: S1 normal and S2 normal  No murmur heard  No friction rub  No gallop  Comments: Nonelevated JVP  Pulmonary:      Breath sounds: Normal breath sounds  Abdominal:      General: There is no distension  Palpations: Abdomen is soft  Tenderness: There is no abdominal tenderness  There is no guarding or rebound     Musculoskeletal: General: Normal range of motion  Cervical back: Neck supple  Right lower leg: Edema present  Skin:     General: Skin is warm and dry  Capillary Refill: Capillary refill takes less than 2 seconds  Neurological:      General: No focal deficit present  Mental Status: He is alert and oriented to person, place, and time  Psychiatric:         Mood and Affect: Mood normal          Labs & Results:    Lab Results   Component Value Date    SODIUM 138 07/13/2021    K 3 8 07/13/2021     (H) 07/13/2021    CO2 20 (L) 07/13/2021    BUN 15 07/13/2021    CREATININE 0 95 07/13/2021    GLUC 97 07/13/2021    CALCIUM 8 3 07/13/2021     Lab Results   Component Value Date    WBC 5 59 07/13/2021    HGB 8 7 (L) 07/13/2021    HCT 29 3 (L) 07/13/2021    MCV 84 07/13/2021     07/13/2021     Lab Results   Component Value Date    NTBNP 5,157 (H) 06/22/2021      No results found for: CHOLESTEROL  No results found for: HDL  No results found for: TRIG  No results found for: [place]    EKG personally reviewed by Zoran Lima MD      Counseling / Coordination of Care  Time spent today 25 minutes  Greater than 50% of total time was spent with the patient and / or family counseling and / or coordination of care  We went over current diagnosis, most recent studies and any changes in treatment  Thank you for the opportunity to participate in the care of this patient      Wilber Phelps MD  ADVANCED HEART FAILURE AND MECHANICAL CIRCULATORY SUPPORT  [facility]

## 2021-07-16 NOTE — PATIENT INSTRUCTIONS
No medication changes at this time  Bring medication list on next office visit  Follow up with vascular surgery

## 2021-07-19 ENCOUNTER — OFFICE VISIT (OUTPATIENT)
Dept: VASCULAR SURGERY | Facility: CLINIC | Age: 76
End: 2021-07-19

## 2021-07-19 VITALS
WEIGHT: 217 LBS | DIASTOLIC BLOOD PRESSURE: 78 MMHG | HEART RATE: 92 BPM | HEIGHT: 67 IN | BODY MASS INDEX: 34.06 KG/M2 | SYSTOLIC BLOOD PRESSURE: 120 MMHG

## 2021-07-19 DIAGNOSIS — Z98.890 POSTOPERATIVE STATE: ICD-10-CM

## 2021-07-19 DIAGNOSIS — I73.9 PAD (PERIPHERAL ARTERY DISEASE) (HCC): Primary | ICD-10-CM

## 2021-07-19 PROCEDURE — 99024 POSTOP FOLLOW-UP VISIT: CPT | Performed by: PHYSICIAN ASSISTANT

## 2021-07-19 NOTE — PROGRESS NOTES
Assessment/Plan:    PAD (peripheral artery disease) [facility])  66-year-old male former smoker with HLD, prostate cancer, s/p chemo/XRT and ongoing chemotherapy, CAD, s/p PCI '01, ICM (EF 20%), combined diastolic/systolic CHF, chronic anemia, AS s/p TAVR 6/14/21 @[facility] and PAD w/near occlusive R CFA/PFA disease and rest pain x 1 month who is now s/p R CFA endarterectomy and profundoplasty w/bovine patch angioplasty by Dr Ellis Urrutia 7/9/21 and presents for initial postoperative follow-up  -doing well from a vascular surgery standpoint  R groin Incision healing well without erythema, induration dehiscence  Slight macerations skin edges but wound edges well approximated  -right lower extremity well perfused with biphasic DP, AT, PT signals    -patient using motorized scooter but denies claudication with short distance, rest pain or new tissue loss   -patient's only complaint is persistent right heel neuropathic pain which has been present several months prior to surgery   -continue incisional care as outlined  -continue aspirin 81 mg daily and plavix 75 mg daily x 2 months postoperatively, then plavix management at the direction of cardiac surgery  continue aspirin indefinitely  -continue statin therapy  -will initiate postoperative surveillance with lower extremity arterial doppler in 3 months, 9 months and then yearly thereafter if unchanged   -return to office in 4-6 weeks with Dr Ellis Urrutia routine postoperative follow-up and incision check   -instructed to contact the office with new symptoms, concerns or changes in incisions  Diagnoses and all orders for this visit:    PAD (peripheral artery disease) ([facility])  -     VAS lower limb arterial duplex, complete bilateral; Future    Postoperative state  -     VAS lower limb arterial duplex, complete bilateral; Future          Subjective:      Patient ID: Saurav Patino is a 76 y o  male  Patient presents today s/p RLE FEM ENDART done 7/9/21 by Dr Luis Felipe Cordon   Patient states he is doing better since the surgery  However, he c/o of pain and swelling in the right foot  Sometimes he will get a sharp pain in right foot that shoots up leg especially when standing  Patient is currently taking ASA 81mg, Plavix, and a statin  27-year-old male former smoker with HLD, prostate cancer, s/p chemo/XRT and ongoing chemotherapy, CAD, s/p PCI '01, ICM (EF 20%), combined diastolic/systolic CHF, chronic anemia, AS s/p TAVR 6/14/21 @[facility] and PAD w/near occlusive R CFA/PFA disease and rest pain x 1 month who is now s/p R CFA endarterectomy and profundoplasty w/bovine patch angioplasty by Dr Ronald Smith 7/9/21 and presents for initial postoperative follow-up  Pt doing well from a vascular surgery standpoint  R groin Incision healing well without erythema, induration dehiscence  Slight macerations skin edges but wound edges well approximated  RLE well perfused with biphasic DP, AT, PT signals  Patient using motorized scooter but denies claudication with short distance, rest pain or new tissue loss  Patient's only complaint is persistent right heel neuropathic pain which has been present several months prior to surgery and evaluated during recent hospitalization  Post intervention KARLI 7/11/2021 with marked improvement in R KARLI, now 0 69, previously 0   L KARLI 1 26  Patient remains on aspirin, Plavix and statin  Patient accompanied to office appointment with son  The following portions of the patient's history were reviewed and updated as appropriate: allergies, current medications, past family history, past medical history, past social history, past surgical history and problem list     Review of Systems   Constitutional: Negative  HENT: Negative  Eyes: Negative  Respiratory: Negative  Cardiovascular: Negative  Gastrointestinal: Negative  Endocrine: Negative  Genitourinary: Negative  Musculoskeletal: Negative  R foot swelling and pain   Skin: Negative  Allergic/Immunologic: Negative  Neurological: Positive for numbness (right foot)  Hematological: Bruises/bleeds easily  Psychiatric/Behavioral: Negative  I have personally reviewed the ROS entered by MA and agree as documented  Objective:      /78 (BP Location: Right arm, Patient Position: Sitting, Cuff Size: Standard)   Pulse 92   Ht 5' 7" (1 702 m)   Wt 98 4 kg (217 lb)   BMI 33 99 kg/m²          Physical Exam  Vitals and nursing note reviewed  Exam conducted with a chaperone present  Constitutional:       General: He is not in acute distress  Appearance: He is not ill-appearing, toxic-appearing or diaphoretic  Cardiovascular:      Rate and Rhythm: Normal rate and regular rhythm  Pulses:           Femoral pulses are 2+ on the right side  Dorsalis pedis pulses are detected w/ Doppler on the right side  Posterior tibial pulses are detected w/ Doppler on the right side  Heart sounds: Normal heart sounds  No murmur heard  No friction rub  No gallop  Comments: Right lower extremity warm, pink, motor and sensory intact and well perfused without cyanosis, pallor, rubor or tissue loss  Right groin incision healing well without erythema, induration, drainage or dehiscence  Slight maceration of skin edges  1+ right lower extremity edema lower leg and ankle  Pulmonary:      Effort: Pulmonary effort is normal  No respiratory distress  Breath sounds: Normal breath sounds  No stridor  No wheezing, rhonchi or rales  Musculoskeletal:      Right lower le+ Edema present  Skin:     General: Skin is warm and dry  Findings: No bruising, erythema, lesion or rash  Neurological:      Mental Status: He is alert and oriented to person, place, and time     Psychiatric:         Mood and Affect: Mood normal

## 2021-07-19 NOTE — PATIENT INSTRUCTIONS
DISCHARGE INSTRUCTIONS  LEG SURGERY    Following discharge from the hospital, you may have some questions about your operation, your activities or your general condition  These instructions may answer some of your questions and help you adjust during the first few weeks following your operation  ACTIVITY:  Limit your physical activity to slow walking  Avoid climbing or heavy lifting for the first four weeks after surgery  Initially some discomfort will be felt when walking as the skin and muscle tissues heal  You may require help with walking or feel more secure with someone to lean on  Walking up steps and normal activities may be resumed, as you feel ready  You should not drive a car for one week following discharge from the hospital   You may ride in a car for short trips upon discharge  DIET:  Resume your normal diet  Try to eat low fat and low cholesterol foods  Good nutrition is important for healing of your incision  INCISION:  You may include the operated area in a shower on the third day following surgery  Wash your incision daily with soap and water  Pat the incision dry and leave open to air  Do not apply lotions, ointments, creams or powders to incision  Sitting in a tub is not recommended for the first two weeks following surgery or if you have any open wounds  It is normal to have swelling or discoloration around the incision  If increasing redness or pain develops, call our office immediately  Numbness in the region of the incision may occur following the surgery  This normally resolves in six to twelve months  If any of your incisions are open and require dressing changes, you will be given instruction for your daily incision care  If you are not able to change the dressings, a visiting nurse will be arranged  Your physician may have chosen to use a type of adhesive glue to close your incision  There are stitches present under the skin which will absorb on their own  The glue is used to cover the incision, assist in closure, and prevent contamination  This adhesive will darken and peel away on its own within one to two weeks  Alternatively, your surgeon may have chosen to use skin staples to close your incision which will be removed in the office at the time of your follow-up appointment  You may wash the incision with the staples present  LEG SWELLING: Most patients have noticeable leg swelling after leg surgery  This usually disappears within a few weeks  If swelling is present, elevate the leg whenever possible  Avoid sitting with the leg hanging down for prolonged time periods  Walking is beneficial   An ACE bandage or support stocking may be helpful, but this should be discussed with your physician prior to use  FOLLOW UP STUDIES:  Doppler ultrasound studies are very important to your post-operative care  Your surgeon will arrange for them at your first postoperative visit  Repeat studies are then scheduled every three months for the first year and periodically after this  PLEASE CALL THE OFFICE IF YOU HAVE ANY QUESTIONS    [phone] [facility] [phone] [facility] FREE [phone]  [address] [place] [address] , [address], [facility] [place], [address] [facility]  [address], [address] [facility], [address] [facility]  [address], [address]  [address] [facility], [address] [facility]  [address], [address], [address]  [address], [address] [facility], [address] [facility] 97   [address] [facility], [address] [facility], [address]  [facility], [address] [facility], [facility], [address]      -your incision is healing well  continue to clean your right groin incision daily with soap and water and place dry 4 x 4 in groin to keep incision site dry  No lotions, ointments or creams to incision x 4 weeks postoperative    No soaking or submerging incision x 4 weeks postoperative   -continue aspirin 81 mg daily and plavix 75 mg daily x 2 months postoperatively, then continue plavix as directed by cardiac surgery  continue aspirin indefinitely  -continue atorvastatin  -we will initiate postoperative surveillance with lower extremity arterial doppler in 3 months, 9 months and then yearly thereafter if unchanged   -return to office in 4-6 weeks with surgeon for routine postoperative follow-up   -please contact the office with new symptoms, concerns or changes in incision

## 2021-07-19 NOTE — ASSESSMENT & PLAN NOTE
77-year-old male former smoker with HLD, prostate cancer, s/p chemo/XRT and ongoing chemotherapy, CAD, s/p PCI '01, ICM (EF 20%), combined diastolic/systolic CHF, chronic anemia, AS s/p TAVR 6/14/21 @[facility] and PAD w/near occlusive R CFA/PFA disease and rest pain x 1 month who is now s/p R CFA endarterectomy and profundoplasty w/bovine patch angioplasty by Dr Saida Hackett 7/9/21 and presents for initial postoperative follow-up  -doing well from a vascular surgery standpoint  R groin Incision healing well without erythema, induration dehiscence  Slight macerations skin edges but wound edges well approximated  -right lower extremity well perfused with biphasic DP, AT, PT signals    -patient using motorized scooter but denies claudication with short distance, rest pain or new tissue loss   -patient's only complaint is persistent right heel neuropathic pain which has been present several months prior to surgery   -continue incisional care as outlined  -continue aspirin 81 mg daily and plavix 75 mg daily x 2 months postoperatively, then plavix management at the direction of cardiac surgery  continue aspirin indefinitely  -continue statin therapy  -will initiate postoperative surveillance with lower extremity arterial doppler in 3 months, 9 months and then yearly thereafter if unchanged   -return to office in 4-6 weeks with Dr Saida Hackett routine postoperative follow-up and incision check   -instructed to contact the office with new symptoms, concerns or changes in incisions

## 2021-07-21 ENCOUNTER — TRANSITIONAL CARE MANAGEMENT (OUTPATIENT)
Dept: INTERNAL MEDICINE CLINIC | Facility: CLINIC | Age: 76
End: 2021-07-21

## 2021-07-22 ENCOUNTER — PATIENT OUTREACH (OUTPATIENT)
Dept: CASE MANAGEMENT | Facility: OTHER | Age: 76
End: 2021-07-22

## 2021-07-22 NOTE — PROGRESS NOTES
OP CM SW second attempt to reach patient  Call completed  Iram Pillai states he is doing well  He doesn't feel that these is anything that he really needs  Royce let me know that he is walking around more now, and that he will take his walker with him  Iram Pillai states his family is a big support  His son will go grocery shopping for him and take him to appointments etc  Will help him with meal prep  Iram Pillai states he is still having pain in his foot  Its about 10 out of 10 and a burning , pounding feeling  No redness or hot to touch  States is a bit swalon  but nothing out of the ordinary  Patient is aware of upcoming doctor appointments and at his next PCP visit he is going to ask if he should follow up with oncology since he hasn't due to other medical issues  No SW needs at this time, patient does not feel that there is anything he needs  Will be closing from care management however will remain available in the future as needed

## 2021-07-23 DIAGNOSIS — K21.9 GASTROESOPHAGEAL REFLUX DISEASE WITHOUT ESOPHAGITIS: Primary | ICD-10-CM

## 2021-07-23 RX ORDER — OMEPRAZOLE 20 MG/1
20 CAPSULE, DELAYED RELEASE ORAL DAILY
Qty: 90 CAPSULE | Refills: 1 | Status: SHIPPED | OUTPATIENT
Start: 2021-07-23 | End: 2021-07-30 | Stop reason: SDUPTHER

## 2021-07-28 ENCOUNTER — OFFICE VISIT (OUTPATIENT)
Dept: INTERNAL MEDICINE CLINIC | Facility: CLINIC | Age: 76
End: 2021-07-28
Payer: COMMERCIAL

## 2021-07-28 VITALS
OXYGEN SATURATION: 97 % | DIASTOLIC BLOOD PRESSURE: 60 MMHG | HEIGHT: 67 IN | HEART RATE: 85 BPM | BODY MASS INDEX: 34.78 KG/M2 | TEMPERATURE: 98.1 F | WEIGHT: 221.6 LBS | SYSTOLIC BLOOD PRESSURE: 118 MMHG

## 2021-07-28 DIAGNOSIS — I25.5 ISCHEMIC CARDIOMYOPATHY: ICD-10-CM

## 2021-07-28 DIAGNOSIS — I73.9 PAD (PERIPHERAL ARTERY DISEASE) (HCC): Primary | ICD-10-CM

## 2021-07-28 DIAGNOSIS — C61 PROSTATE CANCER (HCC): ICD-10-CM

## 2021-07-28 DIAGNOSIS — I50.42 CHRONIC COMBINED SYSTOLIC AND DIASTOLIC CHF (CONGESTIVE HEART FAILURE) (HCC): ICD-10-CM

## 2021-07-28 PROCEDURE — 1036F TOBACCO NON-USER: CPT | Performed by: HOSPITALIST

## 2021-07-28 PROCEDURE — 99214 OFFICE O/P EST MOD 30 MIN: CPT | Performed by: HOSPITALIST

## 2021-07-28 PROCEDURE — 1160F RVW MEDS BY RX/DR IN RCRD: CPT | Performed by: HOSPITALIST

## 2021-07-28 PROCEDURE — 3288F FALL RISK ASSESSMENT DOCD: CPT | Performed by: HOSPITALIST

## 2021-07-28 PROCEDURE — 1101F PT FALLS ASSESS-DOCD LE1/YR: CPT | Performed by: HOSPITALIST

## 2021-07-28 RX ORDER — GABAPENTIN 100 MG/1
300 CAPSULE ORAL 3 TIMES DAILY
Qty: 90 CAPSULE | Refills: 0 | Status: SHIPPED | OUTPATIENT
Start: 2021-07-28 | End: 2021-07-30 | Stop reason: SDUPTHER

## 2021-07-28 NOTE — ASSESSMENT & PLAN NOTE
· S/P  Chemotherapy and radiation in [place], status of his cancer is unknown , record requested     · Patient didn't receive any treatment in 3 month   · referral to heme/onc for further eval and establish care

## 2021-07-28 NOTE — ASSESSMENT & PLAN NOTE
· Continue Toprol XL 12 5 mg daily, recently dose was reduced at the hospital due to low blood pressure  · Reviewed [place] records, Cardiac catheterization 6/9/21: diffuse moderate prox and mid disease/ISR, severe OM lesion, total mid LAD (likely infarcted territory given AK and aneurysmal  · Rest of plan as outlined above

## 2021-07-28 NOTE — PROGRESS NOTES
Assessment/Plan:  PAD (peripheral artery disease) ([facility])  · s/p R CFA endarterectomy and profundoplasty w/bovine patch angioplasty by Dr Genie Amin 7/9/21  · Recently evaluated by vascular surgery   · Continue current DAP therapy with aspirin and plavix for at least two month then will defer to cardiac surgery if Plavix can be stopped  · Will need aspirin indefinitley   · RLE  Well perfused, increased endurance , denies any claudications, pain at rest , skin changes  · Continue to report pain which is neuropathic in nature and been chronic even prior to his procedure   · Increase gabapentin to 300mg TID for better pain control   · Continue surveillance with dopplers at 3, 9 month and then yearly afterwards    · Well advise leg elevation and compression stocking use for better managing the swelling   · Patient was advised to contact me if there is any worsening of symptoms or any concerns   · Continue vasc surgery follow ups       Chronic combined systolic and diastolic CHF (congestive heart failure) ([facility] )  Wt Readings from Last 3 Encounters:   07/28/21 101 kg (221 lb 9 6 oz)   07/19/21 98 4 kg (217 lb)   07/16/21 98 2 kg (216 lb 9 6 oz)     ·  most recent echo on 06/23/2021 showed LVEF of 20% most likely ICM   ·  Patient was prescribed a LifeVest, currently not using (sent back )  ·  currently well compensated with no complaints , no sob , cp , BRANDI, orthopnea or PND , euvolemic on exam   · Continue current medical regimen with no changes   · Emphasized adherance to cardiac diet with low Na, daily weights   · He was advised to take extra dose of lasix if he noticed 3lbs weight gain in one day or 5 lbs in a week and contact the clinic for evaluation   · Continue cardiology follow ups    Prostate cancer ([facility] )  · S/P  Chemotherapy and radiation in [place], status of his cancer is unknown , record requested     · Patient didn't receive any treatment in 3 month   · referral to heme/onc for further eval and establish care Ischemic cardiomyopathy  · Continue Toprol XL 12 5 mg daily, recently dose was reduced at the hospital due to low blood pressure  · Reviewed [place], Cardiac catheterization 6/9/21: diffuse moderate prox and mid disease/ISR, severe OM lesion, total mid LAD (likely infarcted territory given AK and aneurysmal  · Rest of plan as outlined above        Diagnoses and all orders for this visit:    PAD (peripheral artery disease) ([facility])  -     gabapentin (NEURONTIN) 100 mg capsule; Take 3 capsules (300 mg total) by mouth 3 (three) times a day  -     Compression Stocking    Chronic combined systolic and diastolic CHF (congestive heart failure) ([facility])  -     Compression Stocking    Prostate cancer ([facility] )  -     Cancel: Ambulatory referral to Hematology / Oncology; Future  -     Ambulatory referral to Hematology / Oncology; Future    Ischemic cardiomyopathy          Subjective:      Patient ID: Vero Cardoso is a 76 y o  male  Patient is being evaluated today as a transition of care visit after his recent hospitalization , during his last visit there was a concern for PAD and Ischaemic lower ext changes ,lower extremeity dopplers showed significant   Stenosis on the right side currently S/Pendartrectomy and angioplasty with vascular surgery  Was recently reevaluated by [facility] surgery and he is doing very well from that standpoint     Patient has a medical history significant chronic CHF, previous CVA, aortic stenosis  S/P AVR ,  Prostate cancer s/p  Chemotherapy and Radiation therapy  Today he continue to complain of rt lower ext pain especially round the ankles ,reports significant improvement of his endurance , no more claudications and rest pains , no skin changes , the leg is warm and pulses are intact  He also denies any sob , cp , orthopnea , PND , he is compliant with his meds and strict on a low Na diet with no issues      Patient was concerned about his prostate cancersince its been more than a month since his last treatment and wants to establish care with local heme/onc specialist given most of his care were in [place]  The following portions of the patient's history were reviewed and updated as appropriate: allergies, current medications, past family history, past medical history, past social history, past surgical history and problem list     Review of Systems   Constitutional: Negative for activity change, appetite change, chills, fatigue, fever and unexpected weight change  Respiratory: Negative for chest tightness, shortness of breath and wheezing  Cardiovascular: Positive for leg swelling  Negative for chest pain and palpitations  Gastrointestinal: Negative for abdominal pain, nausea and vomiting  Genitourinary: Positive for frequency  Negative for dysuria and flank pain  Musculoskeletal: Positive for back pain  Skin: Negative for color change, pallor and rash  Neurological: Negative for dizziness and light-headedness  Psychiatric/Behavioral: Negative for confusion  The patient is not nervous/anxious  Objective:      /60 (BP Location: Left arm, Patient Position: Sitting, Cuff Size: Standard)   Pulse 85   Temp 98 1 °F (36 7 °C) (Tympanic)   Ht 5' 7" (1 702 m)   Wt 101 kg (221 lb 9 6 oz)   SpO2 97%   BMI 34 71 kg/m²          Physical Exam  Vitals reviewed  Constitutional:       Appearance: Normal appearance  HENT:      Head: Normocephalic and atraumatic  Cardiovascular:      Rate and Rhythm: Normal rate and regular rhythm  Heart sounds: Murmur heard  Pulmonary:      Effort: Pulmonary effort is normal  No respiratory distress  Breath sounds: Normal breath sounds  No wheezing  Abdominal:      General: There is no distension  Palpations: Abdomen is soft  Tenderness: There is no abdominal tenderness  Musculoskeletal:         General: Swelling present  Right lower leg: Edema present  Left lower leg: No edema     Skin:     General: Skin is warm       Findings: No lesion or rash  Neurological:      General: No focal deficit present  Mental Status: He is alert and oriented to person, place, and time     Psychiatric:         Mood and Affect: Mood normal          Behavior: Behavior normal

## 2021-07-28 NOTE — ASSESSMENT & PLAN NOTE
Wt Readings from Last 3 Encounters:   07/28/21 101 kg (221 lb 9 6 oz)   07/19/21 98 4 kg (217 lb)   07/16/21 98 2 kg (216 lb 9 6 oz)     ·  most recent echo on 06/23/2021 showed LVEF of 20% most likely ICM   ·  Patient was prescribed a LifeVest, currently not using (sent back )  ·  currently well compensated with no complaints , no sob , cp , BRANDI, orthopnea or PND , euvolemic on exam   · Continue current medical regimen with no changes   · Emphasized adherance to cardiac diet with low Na, daily weights   · He was advised to take extra dose of lasix if he noticed 3lbs weight gain in one day or 5 lbs in a week and contact the clinic for evaluation   · Continue cardiology follow ups

## 2021-07-28 NOTE — ASSESSMENT & PLAN NOTE
· s/p R CFA endarterectomy and profundoplasty w/bovine patch angioplasty by Dr Arya Hooks 7/9/21  · Recently evaluated by vascular surgery   · Continue current DAP therapy with aspirin and plavix for at least two month then will defer to cardiac surgery if Plavix can be stopped  · Will need aspirin indefinitley   · RLE  Well perfused, increased endurance , denies any claudications, pain at rest , skin changes    · Continue to report pain which is neuropathic in nature and been chronic even prior to his procedure   · Increase gabapentin to 300mg TID for better pain control   · Continue surveillance with dopplers at 3, 9 month and then yearly afterwards    · Well advise leg elevation and compression stocking use for better managing the swelling   · Patient was advised to contact me if there is any worsening of symptoms or any concerns   · Continue vasc surgery follow ups

## 2021-07-29 ENCOUNTER — TELEPHONE (OUTPATIENT)
Dept: INTERNAL MEDICINE CLINIC | Facility: CLINIC | Age: 76
End: 2021-07-29

## 2021-07-29 NOTE — TELEPHONE ENCOUNTER
I have placed a new order for compression stockings, we needed to add the mm of HG compression which I spoke to a in office provider and ordered 20 -30 mmHG  If you wanted something different please reorder  I have sent for below knee 20-30 mmhg   I faxed to [phone]

## 2021-07-30 DIAGNOSIS — I99.8 ISCHEMIC LEG PAIN: ICD-10-CM

## 2021-07-30 DIAGNOSIS — I42.9 CARDIOMYOPATHY, UNSPECIFIED TYPE (HCC): ICD-10-CM

## 2021-07-30 DIAGNOSIS — M79.606 ISCHEMIC LEG PAIN: ICD-10-CM

## 2021-07-30 DIAGNOSIS — I73.9 PAD (PERIPHERAL ARTERY DISEASE) (HCC): ICD-10-CM

## 2021-07-30 DIAGNOSIS — K21.9 GASTROESOPHAGEAL REFLUX DISEASE WITHOUT ESOPHAGITIS: ICD-10-CM

## 2021-07-30 RX ORDER — GABAPENTIN 100 MG/1
300 CAPSULE ORAL 3 TIMES DAILY
Qty: 90 CAPSULE | Refills: 0 | Status: SHIPPED | OUTPATIENT
Start: 2021-07-30 | End: 2021-08-16 | Stop reason: SDUPTHER

## 2021-07-30 RX ORDER — OMEPRAZOLE 20 MG/1
20 CAPSULE, DELAYED RELEASE ORAL DAILY
Qty: 90 CAPSULE | Refills: 1 | Status: SHIPPED | OUTPATIENT
Start: 2021-07-30 | End: 2022-03-07 | Stop reason: SDUPTHER

## 2021-07-30 RX ORDER — FUROSEMIDE 40 MG/1
40 TABLET ORAL DAILY
Qty: 30 TABLET | Refills: 3 | Status: SHIPPED | OUTPATIENT
Start: 2021-07-30 | End: 2021-08-10 | Stop reason: SDUPTHER

## 2021-07-30 RX ORDER — LOSARTAN POTASSIUM 25 MG/1
12.5 TABLET ORAL DAILY
Qty: 30 TABLET | Refills: 3 | Status: SHIPPED | OUTPATIENT
Start: 2021-07-30 | End: 2021-08-16 | Stop reason: SDUPTHER

## 2021-07-30 NOTE — TELEPHONE ENCOUNTER
Patient is requesting refills   He is also taking oxycodone 5mg 1/2 tab for his leg and was wondering if he can get a refill on that as well

## 2021-08-03 ENCOUNTER — TELEPHONE (OUTPATIENT)
Dept: VASCULAR SURGERY | Facility: CLINIC | Age: 76
End: 2021-08-03

## 2021-08-03 NOTE — TELEPHONE ENCOUNTER
Pt s/p R CFA endarterectomy and profundoplasty w/ bovine patch angioplasty 7/9 by Dr Denisse Paez  Pt saw Yolanda Bolus on 7/19 and per note, "patient's only complaint is persistent right heel neuropathic pain which has been present several months prior to surgery "    Pt's visiting nurse, Rodrigo Estevez, called the office to report ongoing neuropathic pain ("pins and needles") in the heel  She said pt denies any new or worsening symptoms  Pt denies any incisional pain  Pt is taking gabapentin for the pain, ordered by his PCP  Pt saw his PCP on 7/28 and gabapentin was increased to 300 mg TID  I advised Rodrigo Estevez to have pt contact his PCP to notify them since they prescribe the gabapentin  Advised if pt has new or worsening symptoms he should contact our office  She verbalized understanding

## 2021-08-06 ENCOUNTER — TELEPHONE (OUTPATIENT)
Dept: HEMATOLOGY ONCOLOGY | Facility: CLINIC | Age: 76
End: 2021-08-06

## 2021-08-06 NOTE — TELEPHONE ENCOUNTER
New Patient  Form     Patient Details:     Silvino Davidson     1945     [id number]     Background Information:     [id number] [employer] starts by opening a telephone encounter and gathering the following information     Who is calling to schedule and relationship? Patient   Who is the referring provider? Peg Ioana   Reason for Visit? History Of   Tumor Type? Metastatic prostate cancer   Is this Cancer or Non-Cancer? Cancer   Does the patient have confirmed tissue pathology with either biopsy or surgery? Yes   When was biopsy/surgery (diagnosis made) and where 2002   Does the Patient have a Urologist? yes   Name of Patient's Urologist  at [facility] at [facility]      Did the patient have imaging done? Yes   If yes, when and where  [facility]   Did patient have blood work done? Yes   If Yes, when and where  recent, priors at [facility]   *Please contact  Navigator for review if Urology is NOT making the referral to Med Onc, or     They answer No to Urologist and Pathology question  *     Scheduling Information:     Preferred Barnie Pop   Are there any days the patient cannot be seen? No   Are you willing to see another provider if we can schedule your visit earlier? Yes   Miscellaneous: Pt was dxd with prostate cancer w/ bone mets in 2002 at [facility]  Had RT at [facility] at that time  Then had injections every 3 months at [facility] , then was started on chemo about 5 months ago  His last chemo was about 3 months ago when pt was admitted for cardiac issue  Pt would like to now resume treatment, but would like to come to  instead as the trip to [facility] is difficult  Pt will request his [facility] records be faxed to [phone] [employer]  We will have NN review when rcd, then schedule appts as directed  Pt is planning to use his medical benefits rather than VA benefits here  After completing the above information, please route to finance, nurse navigation and clinical trials for review

## 2021-08-10 DIAGNOSIS — I42.9 CARDIOMYOPATHY, UNSPECIFIED TYPE (HCC): ICD-10-CM

## 2021-08-10 RX ORDER — ATORVASTATIN CALCIUM 80 MG/1
80 TABLET, FILM COATED ORAL
Qty: 90 TABLET | Refills: 1 | Status: SHIPPED | OUTPATIENT
Start: 2021-08-10 | End: 2021-11-16

## 2021-08-10 RX ORDER — FUROSEMIDE 40 MG/1
40 TABLET ORAL DAILY
Qty: 90 TABLET | Refills: 1 | Status: SHIPPED | OUTPATIENT
Start: 2021-08-10 | End: 2022-01-25

## 2021-08-10 NOTE — TELEPHONE ENCOUNTER
Intake received  Benefits review in process  Pt outreach to follow    Per RTE pt has an active humana plan that runs on a cal year effect 10/01/20  No deduct  Out of pocket $6700 met $1605    No appt at this time will continue to revw this for an appt & then f/u w/pt

## 2021-08-10 NOTE — TELEPHONE ENCOUNTER
Patients scripts were sent to the wrong pharmacy  He uses [facility] [address]   Can we please resend these for him

## 2021-08-13 ENCOUNTER — TELEPHONE (OUTPATIENT)
Dept: INTERNAL MEDICINE CLINIC | Facility: CLINIC | Age: 76
End: 2021-08-13

## 2021-08-16 DIAGNOSIS — D64.9 ANEMIA, UNSPECIFIED TYPE: ICD-10-CM

## 2021-08-16 DIAGNOSIS — I73.9 PAD (PERIPHERAL ARTERY DISEASE) (HCC): ICD-10-CM

## 2021-08-16 DIAGNOSIS — M79.606 ISCHEMIC LEG PAIN: ICD-10-CM

## 2021-08-16 DIAGNOSIS — C61 PROSTATE CANCER (HCC): Primary | ICD-10-CM

## 2021-08-16 DIAGNOSIS — I99.8 ISCHEMIC LEG PAIN: ICD-10-CM

## 2021-08-16 NOTE — TELEPHONE ENCOUNTER
Weston Rene has called the office requesting a medication refill  Free requested abiratone (ZYTIGA) 250 gm tablet, ferrous sulfate 325 (65 Fe) mg tablet, gabapentin (NEURONTIN) 100 mg capsule, lidocaine (LIDODERM) 5%, losartan (COZAAR) 25 mg tablet, metoprolol succinate (TOPROL-XL) 25 mg 24 hr tablet  The refills are to be sent to the [facility] in [place], [place], on [address] [place] [address] Road

## 2021-08-18 RX ORDER — LIDOCAINE 50 MG/G
2 PATCH TOPICAL DAILY
Qty: 15 PATCH | Refills: 0 | Status: SHIPPED | OUTPATIENT
Start: 2021-08-18 | End: 2022-07-07

## 2021-08-18 RX ORDER — ABIRATERONE ACETATE 250 MG/1
1000 TABLET ORAL DAILY
Qty: 30 TABLET | Refills: 2 | Status: SHIPPED | OUTPATIENT
Start: 2021-08-18

## 2021-08-18 RX ORDER — LOSARTAN POTASSIUM 25 MG/1
12.5 TABLET ORAL DAILY
Qty: 30 TABLET | Refills: 3 | Status: SHIPPED | OUTPATIENT
Start: 2021-08-18 | End: 2021-09-16 | Stop reason: SDUPTHER

## 2021-08-18 RX ORDER — GABAPENTIN 100 MG/1
300 CAPSULE ORAL 3 TIMES DAILY
Qty: 90 CAPSULE | Refills: 0 | Status: SHIPPED | OUTPATIENT
Start: 2021-08-18 | End: 2021-09-01 | Stop reason: SDUPTHER

## 2021-08-18 RX ORDER — FERROUS SULFATE 325(65) MG
325 TABLET ORAL 2 TIMES DAILY WITH MEALS
Qty: 30 TABLET | Refills: 2 | Status: SHIPPED | OUTPATIENT
Start: 2021-08-18 | End: 2021-09-29 | Stop reason: SDUPTHER

## 2021-08-18 RX ORDER — METOPROLOL SUCCINATE 25 MG/1
12.5 TABLET, EXTENDED RELEASE ORAL DAILY
Qty: 30 TABLET | Refills: 0 | Status: SHIPPED | OUTPATIENT
Start: 2021-08-18 | End: 2021-09-16 | Stop reason: SDUPTHER

## 2021-08-23 ENCOUNTER — DOCUMENTATION (OUTPATIENT)
Dept: HEMATOLOGY ONCOLOGY | Facility: CLINIC | Age: 76
End: 2021-08-23

## 2021-08-25 NOTE — TELEPHONE ENCOUNTER
I have not yet rcd any records or a referral from the [place] on this patient  I called patient to follow up  He said I should be able to pull everything up on line through the [place]  I let him know that we do not have access to the [facility]'s EHR  He asked me to call the [place] and request the records  I called the [facility] care dept  They stated that pt had requested a referral back in June, but cxd the request when he was admitted to the hospital, he had not contacted them again to renew request  [facility] directed me to Dr Nguyen Salgado's office (Hematology Oncology at [facility] ), they did not have a direct xt for them, but transferred me to the main number for the [address]  I was then transferred to a voice mail where I left a message for a Kofi Aponte, requesting she call me to discuss a referral and records  I also called pt and requested that he call them also to have them issue the referral and fax us the records  Pt stated he would try to call them tomorrow

## 2021-08-26 NOTE — TELEPHONE ENCOUNTER
Margarito Vásquez from Internal Medicine called stating VA need's a Release of Health Information form signed by patient and faxed to [phone]

## 2021-08-31 NOTE — PROGRESS NOTES
Assessment/Plan:    PAD (peripheral artery disease) ([facility])  S/p R CFA endarterectomy for ischemic rest pain  Groin incision fully healed  Royce reports leg/foot feels better however has persistent "numbness/tingling" of foot  (Baseline preoperatively)  He also continues to report that he cant move his toes (Also Baseline preoperatively)  Foot is warm and well perfused with excellent DP multiphasic doppler signal     We discussed that his persistent Toe/Foot numbness/tingling is likely related to neuropathy due to prolonged ischemia preoperatively  Recommend he try taking gabapentin as prescribed 300mg 3x per day  (he currently reports that he has been taking 300mg once daily  Continue ASA/plavix/atorvastatin  Continue surveillance with lower extremity arterial duplex  (already ordered)       Diagnoses and all orders for this visit:    PAD (peripheral artery disease) ([facility])  -     gabapentin (NEURONTIN) 100 mg capsule; Take 3 capsules (300 mg total) by mouth 3 (three) times a day    S/P AVR          Subjective:      Patient ID: Niyah Martin is a 76 y o  male  Patient presents today for a 6 week follow up Rt CFE done 7/9/21 by Dr Karon Gilbert  The right groin incision site is healed well with no signs of infection  Patient denies fever/chills, claudication, or wounds  However, he is still c/o of pain, numbness, tingling, and a burning sensation in the right foot  Patient is currently taking ASA 81mg, Plavix, and Atorvastatin  Butch Sportscrissy presents to office for routine visit  S/p R CFA endarterectomy for ischemic rest pain following TAVR  No new changes in interim since last office visit  Persistent c/o right toe/foot "numbness/tingling"            The following portions of the patient's history were reviewed and updated as appropriate: allergies, current medications, past family history, past medical history, past social history, past surgical history and problem list     Review of Systems   Constitutional: Negative  HENT: Positive for hearing loss  Eyes: Positive for visual disturbance  Respiratory: Negative  Cardiovascular: Negative  Gastrointestinal: Negative  Endocrine: Negative  Genitourinary: Positive for frequency  Musculoskeletal: Positive for back pain and gait problem Jean-Claude Morin)  Skin: Negative  Allergic/Immunologic: Negative  Hematological: Bruises/bleeds easily  Psychiatric/Behavioral: Negative  I have personally reviewed the ROS entered by MA and agree as documented  Objective:      /70 (BP Location: Right arm, Patient Position: Sitting, Cuff Size: Adult)   Pulse 85   Temp 98 5 °F (36 9 °C) (Tympanic)   Wt 99 3 kg (219 lb)   BMI 34 30 kg/m²          Physical Exam  Constitutional:       General: He is not in acute distress  Appearance: He is not ill-appearing or toxic-appearing  HENT:      Head: Normocephalic and atraumatic  Eyes:      Conjunctiva/sclera: Conjunctivae normal    Pulmonary:      Effort: Pulmonary effort is normal    Skin:     General: Skin is warm and dry  Findings: No erythema  Neurological:      General: No focal deficit present  Mental Status: He is alert and oriented to person, place, and time  Psychiatric:         Mood and Affect: Mood normal          Behavior: Behavior normal          Thought Content:  Thought content normal          Judgment: Judgment normal        RIGHT LOWER EXTREMITY:  R groin incision well healed  R DP multiphasic doppler signal  R foot warm & well perfused

## 2021-09-01 ENCOUNTER — OFFICE VISIT (OUTPATIENT)
Dept: VASCULAR SURGERY | Facility: CLINIC | Age: 76
End: 2021-09-01

## 2021-09-01 VITALS
WEIGHT: 219 LBS | TEMPERATURE: 98.5 F | BODY MASS INDEX: 34.3 KG/M2 | HEART RATE: 85 BPM | SYSTOLIC BLOOD PRESSURE: 110 MMHG | DIASTOLIC BLOOD PRESSURE: 70 MMHG

## 2021-09-01 DIAGNOSIS — Z95.2 S/P AVR: ICD-10-CM

## 2021-09-01 DIAGNOSIS — I73.9 PAD (PERIPHERAL ARTERY DISEASE) (HCC): Primary | ICD-10-CM

## 2021-09-01 PROCEDURE — 99024 POSTOP FOLLOW-UP VISIT: CPT | Performed by: SURGERY

## 2021-09-01 RX ORDER — GABAPENTIN 100 MG/1
300 CAPSULE ORAL 3 TIMES DAILY
Qty: 90 CAPSULE | Refills: 3 | Status: SHIPPED | OUTPATIENT
Start: 2021-09-01 | End: 2021-11-10 | Stop reason: SDUPTHER

## 2021-09-01 NOTE — TELEPHONE ENCOUNTER
We do not have a signed release, as we have not yet seen patient  From my previous call to [place], it is my understanding that the oncologist there was referring to us, but pt put a hold on this and only needed to let them know when he was ready to proceed  I do not have a contact number for Lannie Kocher, so I sent her a fax requesting that she call me to discuss  I also called Mr Jarvis Sea  He said he could not reach anyone at the [place] yesterday, but that he was given a fax number, but does not understand what he is to do with that  He said he would call them later, that he is on his way to a doctor appointment right now  I asked him to have that office call me while he is there and possibly we can have him sign a release that they can fax to me and then I can forward to the [place]

## 2021-09-01 NOTE — ASSESSMENT & PLAN NOTE
S/p R CFA endarterectomy for ischemic rest pain  Groin incision fully healed  Royce reports leg/foot feels better however has persistent "numbness/tingling" of foot  (Baseline preoperatively)  He also continues to report that he cant move his toes (Also Baseline preoperatively)  Foot is warm and well perfused with excellent DP multiphasic doppler signal     We discussed that his persistent Toe/Foot numbness/tingling is likely related to neuropathy due to prolonged ischemia preoperatively  Recommend he try taking gabapentin as prescribed 300mg 3x per day  (he currently reports that he has been taking 300mg once daily  Continue ASA/plavix/atorvastatin  Continue surveillance with lower extremity arterial duplex   (already ordered)

## 2021-09-09 DIAGNOSIS — I42.9 CARDIOMYOPATHY, UNSPECIFIED TYPE (HCC): ICD-10-CM

## 2021-09-09 RX ORDER — FUROSEMIDE 40 MG/1
40 TABLET ORAL DAILY
Qty: 90 TABLET | Refills: 1 | Status: CANCELLED | OUTPATIENT
Start: 2021-09-09

## 2021-09-10 ENCOUNTER — TELEPHONE (OUTPATIENT)
Dept: HEMATOLOGY ONCOLOGY | Facility: CLINIC | Age: 76
End: 2021-09-10

## 2021-09-10 NOTE — TELEPHONE ENCOUNTER
Patient called stating he spoke to someone in the [facility] today and will be releasing records today, He will callback our office next week to follow up and will call VA to make sure they sent over records

## 2021-09-14 NOTE — PROGRESS NOTES
Advanced Heart Failure Outpatient Progress Note - Samy Rene 76 y o  male MRN: [id number]    Encounter: [id number]      Assessment/Plan:    Patient Active Problem List    Diagnosis Date Noted    PAD (peripheral artery disease) ([facility] ) 07/19/2021    Lower extremity pain 07/11/2021    Urinary retention 07/11/2021    CAD (coronary artery disease) 07/10/2021    Fever 07/10/2021    Hypotension 07/10/2021    Ischemic leg pain 07/07/2021    Prostate cancer ([facility] ) 07/07/2021    Chronic midline low back pain 07/01/2021    Ischemic cardiomyopathy 07/01/2021    Chronic combined systolic and diastolic CHF (congestive heart failure) ([place] [address] ) 06/22/2021    S/P AVR 06/22/2021    Anemia 06/22/2021     # RLE ischemia  LEAD with >75% stenosis of the CFA/SFA  s/p right femoral endarterectomy 7/9/21  Continues on ASA, Plavix, and statin  Follows with Dr Rachael Weller     # Chronic HFrEF; LVEF 20%; LVIDd 5 9 cm; [facility] [place]; ACC/AHA Stage C  Etiology: ischemic +/- progression of valvular disease +/- chemotherapy (on abiraterone and leuprolide with unclear safety profile)    Euvolemic, warm and and well perfused on exam    Diagnostic:    TTE 06/14/2021 (at [facility] pre- and post-TAVR, per report): LVEF 20%  LVIDd 5 4 cm  "entire apex, mid and apical anterior septum, mid and apical inferior septum, mid and apical inferior wall, mid inferolateral wall, and mid lateral wall are akinetic " Trace MR and TR  Pre-TAVR: LVOT VTI 12 0 cm  JOSE 0 47 cm^2  Post: bioprosthetic AV valve present; JOSE 2 41 cm^2                  TTE 06/23/2021:   LVEF 20%  LVIDd 5 9 cm  Grade 2 DD  aneurysmal LV mid to apex "dyskinesis and aneurysmal deformity of the apical anterior, mid anteroseptal, apical inferior, apical septal, and apical wall(s) "   Normal RV size and RVSF  Mild MR  AV bioprosthesis with normal leaflet separation   Trace TR       Neurohormonal Blockade:  --Beta-Blocker: Metoprolol succinate 12 5 mg BID  --ACEi, ARB or ARNi: Entresto 24/26 mg BID -on hold; switched to losartan 12 5mg daily due to low blood pressure    (or SVR reduction)  --Aldosterone Receptor Blocker: Spironolactone 12 5 mg daily - on hold  --SGLT2-I:   --Diuretic: Lasix 40 mg daily     Sudden Cardiac Death Risk Reduction:  --ICD: EF 20%  Had LifeVest- he sent it back     Electrical Resynchronization:  -- LBBB QRSd 158 msec     Advanced Therapies (If appropriate): --Inotrope:  --LVAD/Transplant Candidacy:     Prostate cancer with bony metastases  Receiving chemotherapy through the [place]  Taking SQ leuprolide (Eligard) and abiraterone (Zytiga) - safety of abiraterone in patients with LVEF <50% not established per FDA  [place]-analysis of drug noted increased incidence and relative risk of CV toxicity      Coronary artery disease  Without active chest pain  S/pstenting to circumflex and RCA in 2001  [facility] 06/09/2021 (per documentation):"diffuse moderate prox and mid disease/ISR, severe OM lesion and total mid LAD "  Rx: aspirin, plavix, statin     Aortic stenosis  S/p TAVR (Evolut Pro+ 29mm) on 06/14/2021 at [facility]       Hyperlipidemia     History of atrial tachycardia: s/p ablation at OSH in 2018  Benign prostatic hyperplasia      TODAY'S PLAN:  Continue furosemide  Increase losartan to 25mg daily  Increase metoprolol succinate to 25mg daily  BMP in 2 weeks with lipid panel  Obtain echocardiogram to reassess LV systolic function  Discussed possible ICD and patient would like to pursue this therapy if indicated        HPI:   Silvino Davidson is a 70-year-old man with a PMH as aboe who presents for hospital follow-up      Presented to [facility] hospital on 06/03/2021 with chest pressure before chemotherapy treatment for prostate cancer  Sent for testing and noted to be in acute CHF exacerbation and was diuresed  Was then transferred to [facility] in [facility]   TTE showed "severely reduced LVEF with LV apical aneurysm and septal/AW AK, RV normal, Vmax at 3 5 (however, it was read as moderate AS and preserved EF in 2019 so likely this is progression of AS in addition to interim AWMI) "  C also done which revealed "diffuse moderate prox and mid disease/ISR, severe OM lesion and total mid LAD "     Transferred to [facility] on 06/13 for TAVR  TAVR completed on 06/14, and patient was discharged home on 06/15/2021  Started on metoprolol succinate this admission (tartrate discontinued)       Admitted to [facility] from 06/22 to 06/25/2021 after presenting with worsening SOB and LE edema for 3 days  WAS without discharge medications from [facility] admission for about 1 week (including Lasix)  NT-proBNP 5157  Cardiology was consulted, TTE was completed, and IV diuresis was started  Reduced LVEF was confirmed and aneurysmal wall motion of apical segments  Transitioned to PO diuretics on day of discharge  Started on Entresto and fit with LifeVest  Lost 10 lbs this admission       07/06/2021: Patient presents for hospital follow-up  Primary complaint today is of dizziness and right LE pain/numbeness and right foot wound  Reports decreased appetite and increased frustration with attempting to adhere to low sodium diet  Did receive "Living With Heart Failure" booklet during admission  Prior to admissions, often would eat canned pastas for his meals  Considering getting Meals of Wheels  Unclear if patient has been taking both tartrate and succinate since discharge  Not wearing LifeVest; dislikes wearing it and left device at home today  Has not been completing daily weights; does not have scale at home  Patient organizes his own meds; VNA in home 2 times weekly  7/16/2021: Patient is here for follow-up  Patient hospitalized 6/17/20July 7-13 due to right lower extremity ischemia  Underwent right femoral endarterectomy on June 9  Furosemide Entresto held prior to surgery due to low blood pressure  Eventually discharged on low-dose losartan 12 5 mg daily due to soft blood pressure  Furosemide 40 mg daily resumed  Patient reports right lower extremity pain and difficulty bearing weight on the right leg  Otherwise denies shortness of breath, chest pain or lightheadedness  Interval History:  9/15/21: Here for follow up  Overall doing good  Mainly complaining of residual numbness/tingling on right leg  No shortness of breath, chest pain, palpitations or lightheadedness  Review of Systems   Constitutional: Negative for chills and fever  HENT: Negative for ear pain and sore throat  Eyes: Negative for pain and visual disturbance  Respiratory: Negative for cough, chest tightness and shortness of breath  Cardiovascular: Negative for chest pain, palpitations and leg swelling  Gastrointestinal: Negative for abdominal pain and vomiting  Genitourinary: Negative for dysuria and hematuria  Musculoskeletal: Negative for arthralgias and back pain  Skin: Negative for color change and rash  Neurological: Negative for dizziness, seizures, syncope and light-headedness  All other systems reviewed and are negative  Past Medical History:   Diagnosis Date    Cancer [facility]) 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    Coronary artery disease 2001    S/p stenting to circumflex and RCA    HFrEF (heart failure with reduced ejection fraction) ([facility] ) 06/14/2021    High cholesterol     Prostate cancer (HCC)          No Known Allergies        Current Outpatient Medications:     abiraterone (ZYTIGA) 250 mg tablet, Take 4 tablets (1,000 mg total) by mouth daily, Disp: 30 tablet, Rfl: 2    acetaminophen (TYLENOL) 325 mg tablet, Take 3 tablets (975 mg total) by mouth every 8 (eight) hours, Disp: 90 tablet, Rfl: 0    aspirin 81 mg chewable tablet, Chew 1 tablet (81 mg total) daily, Disp: 30 tablet, Rfl: 0    atorvastatin (LIPITOR) 80 mg tablet, Take 1 tablet (80 mg total) by mouth daily with dinner, Disp: 90 tablet, Rfl: 1    clopidogrel (PLAVIX) 75 mg tablet, TAKE ONE TABLET BY MOUTH DAILY AS BLOOD THINNER, Disp: , Rfl:     docusate sodium (COLACE) 100 mg capsule, Take 1 capsule (100 mg total) by mouth 2 (two) times a day, Disp: 10 capsule, Rfl: 0    ferrous sulfate 325 (65 Fe) mg tablet, Take 1 tablet (325 mg total) by mouth 2 (two) times a day with meals, Disp: 30 tablet, Rfl: 2    furosemide (LASIX) 40 mg tablet, Take 1 tablet (40 mg total) by mouth daily, Disp: 90 tablet, Rfl: 1    gabapentin (NEURONTIN) 100 mg capsule, Take 3 capsules (300 mg total) by mouth 3 (three) times a day, Disp: 90 capsule, Rfl: 3    leuprolide (Eligard) 22 5 mg, 22 5MG/1 SYRINGE UNDER THE SKIN ONE TIME AS DIRECTED EVERY 3 MONTHS, Disp: , Rfl:     lidocaine (LIDODERM) 5 %, Apply 2 patches topically daily Remove & Discard patch within 12 hours or as directed by MD, Disp: 15 patch, Rfl: 0    losartan (COZAAR) 25 mg tablet, Take 0 5 tablets (12 5 mg total) by mouth daily, Disp: 30 tablet, Rfl: 3    methocarbamol (ROBAXIN) 500 mg tablet, Take 1 tablet (500 mg total) by mouth every 6 (six) hours, Disp: 120 tablet, Rfl: 0    metoprolol succinate (TOPROL-XL) 25 mg 24 hr tablet, Take 0 5 tablets (12 5 mg total) by mouth daily, Disp: 30 tablet, Rfl: 0    omeprazole (PriLOSEC) 20 mg delayed release capsule, Take 1 capsule (20 mg total) by mouth daily, Disp: 90 capsule, Rfl: 1    tamsulosin (FLOMAX) 0 4 mg, 0 4 mg, Disp: , Rfl:     traMADol (ULTRAM) 50 mg tablet, Take 50 mg by mouth every 6 (six) hours as needed for moderate pain , Disp: , Rfl:     Social History     Socioeconomic History    Marital status:       Spouse name: Not on file    Number of children: 3    Years of education: Not on file    Highest education level: Not on file   Occupational History    Not on file   Tobacco Use    Smoking status: Former Smoker     Years: 20 00     Quit date: 2002     Years since quittin 2    Smokeless tobacco: Never Used   Vaping Use    Vaping Use: Never used   Substance and Sexual Activity    Alcohol use: Not Currently    Drug use: Not on file    Sexual activity: Not on file   Other Topics Concern    Not on file   Social History Narrative    Not on file     Social Determinants of Health     Financial Resource Strain:     Difficulty of Paying Living Expenses:    Food Insecurity:     Worried About Running Out of Food in the Last Year:     [address] in the Last Year:    Transportation Needs:     Lack of Transportation (Medical):  Lack of Transportation (Non-Medical):    Physical Activity:     Days of Exercise per Week:     Minutes of Exercise per Session:    Stress:     Feeling of Stress :    Social Connections:     Frequency of Communication with Friends and Family:     Frequency of Social Gatherings with Friends and Family:     Attends [employer] Services:     Active Member of Clubs or Organizations:     Attends Club or Organization Meetings:     Marital Status:    Intimate Partner Violence:     Fear of Current or Ex-Partner:     Emotionally Abused:     Physically Abused:     Sexually Abused:        No family history on file  Physical Exam:    Vitals: There were no vitals filed for this visit  Physical Exam  Constitutional:       General: He is not in acute distress  Appearance: Normal appearance  HENT:      Head: Normocephalic and atraumatic  Mouth/Throat:      Mouth: Mucous membranes are moist    Eyes:      General: No scleral icterus  Extraocular Movements: Extraocular movements intact  Cardiovascular:      Rate and Rhythm: Normal rate and regular rhythm  Heart sounds: S1 normal and S2 normal  No murmur heard  No friction rub  No gallop  Comments: Nonelevated JVP  Trace pitting edema bilaterally  Pulmonary:      Breath sounds: Normal breath sounds  Abdominal:      General: There is no distension  Palpations: Abdomen is soft  Tenderness: There is no abdominal tenderness  There is no guarding or rebound  Musculoskeletal:         General: Normal range of motion        Cervical back: Neck supple  Skin:     General: Skin is warm and dry  Capillary Refill: Capillary refill takes less than 2 seconds  Neurological:      General: No focal deficit present  Mental Status: He is alert and oriented to person, place, and time  Psychiatric:         Mood and Affect: Mood normal          Labs & Results:    Lab Results   Component Value Date    SODIUM 138 07/13/2021    K 3 8 07/13/2021     (H) 07/13/2021    CO2 20 (L) 07/13/2021    BUN 15 07/13/2021    CREATININE 0 95 07/13/2021    GLUC 97 07/13/2021    CALCIUM 8 3 07/13/2021     Lab Results   Component Value Date    WBC 5 59 07/13/2021    HGB 8 7 (L) 07/13/2021    HCT 29 3 (L) 07/13/2021    MCV 84 07/13/2021     07/13/2021     Lab Results   Component Value Date    NTBNP 5,157 (H) 06/22/2021      No results found for: CHOLESTEROL  No results found for: HDL  No results found for: TRIG  No results found for: Jaime    EKG personally reviewed by Tj President, MD      Counseling / Coordination of Care  Time spent today 25 minutes  Greater than 50% of total time was spent with the patient and / or family counseling and / or coordination of care  We went over current diagnosis, most recent studies and any changes in treatment  Thank you for the opportunity to participate in the care of this patient      Osman Dyer MD  ADVANCED HEART FAILURE AND MECHANICAL CIRCULATORY SUPPORT  [facility]

## 2021-09-15 DIAGNOSIS — M79.606 ISCHEMIC LEG PAIN: ICD-10-CM

## 2021-09-15 DIAGNOSIS — I99.8 ISCHEMIC LEG PAIN: ICD-10-CM

## 2021-09-15 RX ORDER — METHOCARBAMOL 500 MG/1
500 TABLET, FILM COATED ORAL EVERY 6 HOURS SCHEDULED
Qty: 120 TABLET | Refills: 0 | Status: SHIPPED | OUTPATIENT
Start: 2021-09-15 | End: 2021-11-16 | Stop reason: SDUPTHER

## 2021-09-16 ENCOUNTER — OFFICE VISIT (OUTPATIENT)
Dept: CARDIOLOGY CLINIC | Facility: CLINIC | Age: 76
End: 2021-09-16
Payer: COMMERCIAL

## 2021-09-16 VITALS
OXYGEN SATURATION: 97 % | BODY MASS INDEX: 34.15 KG/M2 | WEIGHT: 217.6 LBS | SYSTOLIC BLOOD PRESSURE: 112 MMHG | HEART RATE: 87 BPM | DIASTOLIC BLOOD PRESSURE: 70 MMHG | HEIGHT: 67 IN

## 2021-09-16 DIAGNOSIS — I25.10 CORONARY ARTERY DISEASE INVOLVING NATIVE CORONARY ARTERY OF NATIVE HEART WITHOUT ANGINA PECTORIS: ICD-10-CM

## 2021-09-16 DIAGNOSIS — I50.22 CHRONIC HFREF (HEART FAILURE WITH REDUCED EJECTION FRACTION) (HCC): Primary | ICD-10-CM

## 2021-09-16 DIAGNOSIS — Z95.2 S/P TAVR (TRANSCATHETER AORTIC VALVE REPLACEMENT): ICD-10-CM

## 2021-09-16 DIAGNOSIS — I73.9 PAD (PERIPHERAL ARTERY DISEASE) (HCC): ICD-10-CM

## 2021-09-16 PROCEDURE — 1160F RVW MEDS BY RX/DR IN RCRD: CPT | Performed by: INTERNAL MEDICINE

## 2021-09-16 PROCEDURE — 1036F TOBACCO NON-USER: CPT | Performed by: INTERNAL MEDICINE

## 2021-09-16 PROCEDURE — 99214 OFFICE O/P EST MOD 30 MIN: CPT | Performed by: INTERNAL MEDICINE

## 2021-09-16 RX ORDER — LOSARTAN POTASSIUM 25 MG/1
25 TABLET ORAL DAILY
Qty: 30 TABLET | Refills: 3 | Status: SHIPPED | OUTPATIENT
Start: 2021-09-16 | End: 2022-01-14

## 2021-09-16 RX ORDER — METOPROLOL SUCCINATE 25 MG/1
25 TABLET, EXTENDED RELEASE ORAL DAILY
Qty: 30 TABLET | Refills: 2 | Status: SHIPPED | OUTPATIENT
Start: 2021-09-16 | End: 2021-10-26

## 2021-09-16 NOTE — PATIENT INSTRUCTIONS
Increase losartan to 25mg daily  Increase metoprolol succinate to 25mg daily  Obtain blood work as ordered  (BMP, lipid panel) in 2 weeks  Obtain echocardiogr

## 2021-09-21 NOTE — TELEPHONE ENCOUNTER
I called pt to follow up to see if he spoke with the [place] about his referral to our practice  He said he has spoken several times to [facility] at the Oncology office at [place]  She told him that she would put in the referral but he needed to sign something but he did not know where he needed to go to do this  I asked him if he had a  in [facility], he was not aware of one  I told him I would reach out to [facility] to see if I could help to facilitate this  He said [facility] ([phone]) usually is not available in the afternoons  I had a number for [facility], so I tried to call them first   I spoke with Eda Guy ([phone])  She said they do have an open referral for him  They left pt a mssg on 9/7/21 to begin the process of scheduling with us, but pt had not returned their call  I asked that she reach out to pt again as I believe he has been calling the oncology office and not the community care office mistakenly  She said that he would need to call them  I asked if I would be able to step in and help to facilitate this  She said that she would fax me his information, she would not be able to give me the referral until I gave her the appointment date  I will call pt to schedule after I rcv records and nurse navigator reviews them and offers direction  I will get back to her with the appt date  I called the pt to let him know of my progress and also gave him Corina's contact information

## 2021-09-22 NOTE — TELEPHONE ENCOUNTER
Records rcd and sent to Right fax for upload to chart  Will forward to nurse navigator for review and await direction

## 2021-09-23 ENCOUNTER — PATIENT OUTREACH (OUTPATIENT)
Dept: UROLOGY | Facility: CLINIC | Age: 76
End: 2021-09-23

## 2021-09-23 ENCOUNTER — TELEPHONE (OUTPATIENT)
Dept: HEMATOLOGY ONCOLOGY | Facility: CLINIC | Age: 76
End: 2021-09-23

## 2021-09-23 NOTE — PROGRESS NOTES
Groverla Favre is transferring care from the [facility]  He has been scheduled for consult with Dr Irvin Carbajal 10/5/21  He has been receiving Lupron 22 5 mg (3 moth dose)  He can't remember date of last dose but thinks he is due for his injection  According to records, last leuprolide 22 5 mg was dosed 3/31/21  He stated he has a port  He had received Docetaxel 3/20/20 to 5/2021  Hope line did receive records from the [facility] and will have them scanned to [facility]  He is aware of the date, time, and location of consult with Dr Irvin Carbajal  He was given my direct contact number and was encouraged to call if he has questions or needs assistance

## 2021-09-23 NOTE — TELEPHONE ENCOUNTER
Spoke with pt, he has been scheduled with Dr Nidia Burnett at [facility] on 10/5/2021  New patient packet mailed to pt  I left voice mail for Rome Marques at [facility] office at [facility] ([phone]) requesting that she call me for appt details, she will need to do a referral for this  Also I will need to request slides and need to know where to fax the slide request and will also need her to mail a CD of pt's images to the [facility] Radiology Reading Room

## 2021-09-24 NOTE — TELEPHONE ENCOUNTER
Sarahyazmin Jerald returned my call  She will do the referral and fax to us  She will mail a CD to the Radiology Reading Room at [facility]  She directed me to request the slides from the nurse, Hollie Wells, at [phone]  She transferred me to her line, where I left a voice mail  I faxed the slide request to [phone]

## 2021-09-28 ENCOUNTER — PATIENT OUTREACH (OUTPATIENT)
Dept: UROLOGY | Facility: AMBULATORY SURGERY CENTER | Age: 76
End: 2021-09-28

## 2021-09-28 NOTE — PROGRESS NOTES
Royce called and L/M that he has questions about an appointment  Attempted to call him back  [phone]  Unable to leave message because VM was full

## 2021-09-29 DIAGNOSIS — D64.9 ANEMIA, UNSPECIFIED TYPE: ICD-10-CM

## 2021-09-29 RX ORDER — FERROUS SULFATE 325(65) MG
325 TABLET ORAL 2 TIMES DAILY WITH MEALS
Qty: 60 TABLET | Refills: 1 | Status: SHIPPED | OUTPATIENT
Start: 2021-09-29 | End: 2021-12-15

## 2021-09-30 ENCOUNTER — APPOINTMENT (OUTPATIENT)
Dept: LAB | Facility: CLINIC | Age: 76
End: 2021-09-30
Payer: COMMERCIAL

## 2021-09-30 DIAGNOSIS — I50.22 CHRONIC HFREF (HEART FAILURE WITH REDUCED EJECTION FRACTION) (HCC): ICD-10-CM

## 2021-09-30 LAB
ANION GAP SERPL CALCULATED.3IONS-SCNC: 10 MMOL/L (ref 4–13)
BASOPHILS # BLD AUTO: 0.04 THOUSANDS/ΜL (ref 0–0.1)
BASOPHILS NFR BLD AUTO: 1 % (ref 0–1)
BUN SERPL-MCNC: 22 MG/DL (ref 5–25)
CALCIUM SERPL-MCNC: 8.3 MG/DL (ref 8.3–10.1)
CHLORIDE SERPL-SCNC: 108 MMOL/L (ref 100–108)
CHOLEST SERPL-MCNC: 146 MG/DL (ref 50–200)
CO2 SERPL-SCNC: 25 MMOL/L (ref 21–32)
CREAT SERPL-MCNC: 0.89 MG/DL (ref 0.6–1.3)
EOSINOPHIL # BLD AUTO: 0.25 THOUSAND/ΜL (ref 0–0.61)
EOSINOPHIL NFR BLD AUTO: 5 % (ref 0–6)
ERYTHROCYTE [DISTWIDTH] IN BLOOD BY AUTOMATED COUNT: 15.6 % (ref 11.6–15.1)
GFR SERPL CREATININE-BSD FRML MDRD: 84 ML/MIN/1.73SQ M
GLUCOSE P FAST SERPL-MCNC: 95 MG/DL (ref 65–99)
HCT VFR BLD AUTO: 34.9 % (ref 36.5–49.3)
HDLC SERPL-MCNC: 35 MG/DL
HGB BLD-MCNC: 10.5 G/DL (ref 12–17)
IMM GRANULOCYTES # BLD AUTO: 0.05 THOUSAND/UL (ref 0–0.2)
IMM GRANULOCYTES NFR BLD AUTO: 1 % (ref 0–2)
LDLC SERPL CALC-MCNC: 61 MG/DL (ref 0–100)
LYMPHOCYTES # BLD AUTO: 1.18 THOUSANDS/ΜL (ref 0.6–4.47)
LYMPHOCYTES NFR BLD AUTO: 24 % (ref 14–44)
MCH RBC QN AUTO: 25.2 PG (ref 26.8–34.3)
MCHC RBC AUTO-ENTMCNC: 30.1 G/DL (ref 31.4–37.4)
MCV RBC AUTO: 84 FL (ref 82–98)
MONOCYTES # BLD AUTO: 0.5 THOUSAND/ΜL (ref 0.17–1.22)
MONOCYTES NFR BLD AUTO: 10 % (ref 4–12)
NEUTROPHILS # BLD AUTO: 2.96 THOUSANDS/ΜL (ref 1.85–7.62)
NEUTS SEG NFR BLD AUTO: 59 % (ref 43–75)
NRBC BLD AUTO-RTO: 0 /100 WBCS
PLATELET # BLD AUTO: 216 THOUSANDS/UL (ref 149–390)
PMV BLD AUTO: 10.1 FL (ref 8.9–12.7)
POTASSIUM SERPL-SCNC: 3.7 MMOL/L (ref 3.5–5.3)
RBC # BLD AUTO: 4.16 MILLION/UL (ref 3.88–5.62)
SODIUM SERPL-SCNC: 143 MMOL/L (ref 136–145)
TRIGL SERPL-MCNC: 249 MG/DL
WBC # BLD AUTO: 4.98 THOUSAND/UL (ref 4.31–10.16)

## 2021-09-30 PROCEDURE — 36415 COLL VENOUS BLD VENIPUNCTURE: CPT

## 2021-09-30 PROCEDURE — 80048 BASIC METABOLIC PNL TOTAL CA: CPT

## 2021-09-30 PROCEDURE — 85025 COMPLETE CBC W/AUTO DIFF WBC: CPT

## 2021-09-30 PROCEDURE — 80061 LIPID PANEL: CPT

## 2021-10-05 ENCOUNTER — CONSULT (OUTPATIENT)
Dept: HEMATOLOGY ONCOLOGY | Facility: CLINIC | Age: 76
End: 2021-10-05
Payer: COMMERCIAL

## 2021-10-05 ENCOUNTER — HOSPITAL ENCOUNTER (OUTPATIENT)
Dept: INFUSION CENTER | Facility: CLINIC | Age: 76
Discharge: HOME | End: 2021-10-05
Payer: COMMERCIAL

## 2021-10-05 VITALS
DIASTOLIC BLOOD PRESSURE: 70 MMHG | TEMPERATURE: 97.7 F | BODY MASS INDEX: 34.21 KG/M2 | SYSTOLIC BLOOD PRESSURE: 128 MMHG | HEIGHT: 67 IN | HEART RATE: 76 BPM | RESPIRATION RATE: 18 BRPM | WEIGHT: 218 LBS

## 2021-10-05 DIAGNOSIS — C61 PROSTATE CANCER (HCC): Primary | ICD-10-CM

## 2021-10-05 DIAGNOSIS — Z95.828 PORT-A-CATH IN PLACE: ICD-10-CM

## 2021-10-05 LAB
ALBUMIN SERPL BCP-MCNC: 3.8 G/DL (ref 3.5–5)
ALP SERPL-CCNC: 83 U/L (ref 46–116)
ALT SERPL W P-5'-P-CCNC: 22 U/L (ref 12–78)
ANION GAP SERPL CALCULATED.3IONS-SCNC: 12 MMOL/L (ref 4–13)
AST SERPL W P-5'-P-CCNC: 13 U/L (ref 5–45)
BASOPHILS # BLD AUTO: 0.05 THOUSANDS/ΜL (ref 0–0.1)
BASOPHILS NFR BLD AUTO: 1 % (ref 0–1)
BILIRUB SERPL-MCNC: 0.84 MG/DL (ref 0.2–1)
BUN SERPL-MCNC: 17 MG/DL (ref 5–25)
CALCIUM SERPL-MCNC: 8.4 MG/DL (ref 8.3–10.1)
CHLORIDE SERPL-SCNC: 108 MMOL/L (ref 100–108)
CO2 SERPL-SCNC: 23 MMOL/L (ref 21–32)
CREAT SERPL-MCNC: 0.99 MG/DL (ref 0.6–1.3)
EOSINOPHIL # BLD AUTO: 0.22 THOUSAND/ΜL (ref 0–0.61)
EOSINOPHIL NFR BLD AUTO: 3 % (ref 0–6)
ERYTHROCYTE [DISTWIDTH] IN BLOOD BY AUTOMATED COUNT: 15.7 % (ref 11.6–15.1)
GFR SERPL CREATININE-BSD FRML MDRD: 74 ML/MIN/1.73SQ M
GLUCOSE SERPL-MCNC: 95 MG/DL (ref 65–140)
HCT VFR BLD AUTO: 34.4 % (ref 36.5–49.3)
HGB BLD-MCNC: 10.5 G/DL (ref 12–17)
IMM GRANULOCYTES # BLD AUTO: 0.06 THOUSAND/UL (ref 0–0.2)
IMM GRANULOCYTES NFR BLD AUTO: 1 % (ref 0–2)
LYMPHOCYTES # BLD AUTO: 1.19 THOUSANDS/ΜL (ref 0.6–4.47)
LYMPHOCYTES NFR BLD AUTO: 17 % (ref 14–44)
MCH RBC QN AUTO: 25.5 PG (ref 26.8–34.3)
MCHC RBC AUTO-ENTMCNC: 30.5 G/DL (ref 31.4–37.4)
MCV RBC AUTO: 84 FL (ref 82–98)
MONOCYTES # BLD AUTO: 0.59 THOUSAND/ΜL (ref 0.17–1.22)
MONOCYTES NFR BLD AUTO: 8 % (ref 4–12)
NEUTROPHILS # BLD AUTO: 4.95 THOUSANDS/ΜL (ref 1.85–7.62)
NEUTS SEG NFR BLD AUTO: 70 % (ref 43–75)
NRBC BLD AUTO-RTO: 0 /100 WBCS
PLATELET # BLD AUTO: 224 THOUSANDS/UL (ref 149–390)
PMV BLD AUTO: 10.4 FL (ref 8.9–12.7)
POTASSIUM SERPL-SCNC: 3.8 MMOL/L (ref 3.5–5.3)
PROT SERPL-MCNC: 6.5 G/DL (ref 6.4–8.2)
PSA SERPL-MCNC: 21 NG/ML (ref 0–4)
RBC # BLD AUTO: 4.11 MILLION/UL (ref 3.88–5.62)
SODIUM SERPL-SCNC: 143 MMOL/L (ref 136–145)
WBC # BLD AUTO: 7.06 THOUSAND/UL (ref 4.31–10.16)

## 2021-10-05 PROCEDURE — 99205 OFFICE O/P NEW HI 60 MIN: CPT | Performed by: INTERNAL MEDICINE

## 2021-10-05 PROCEDURE — 85025 COMPLETE CBC W/AUTO DIFF WBC: CPT

## 2021-10-05 PROCEDURE — 84153 ASSAY OF PSA TOTAL: CPT

## 2021-10-05 PROCEDURE — 80053 COMPREHEN METABOLIC PANEL: CPT

## 2021-10-06 ENCOUNTER — TELEPHONE (OUTPATIENT)
Dept: INTERNAL MEDICINE CLINIC | Facility: CLINIC | Age: 76
End: 2021-10-06

## 2021-10-06 ENCOUNTER — TELEPHONE (OUTPATIENT)
Dept: CARDIOLOGY CLINIC | Facility: CLINIC | Age: 76
End: 2021-10-06

## 2021-10-07 ENCOUNTER — HOSPITAL ENCOUNTER (OUTPATIENT)
Dept: RADIOLOGY | Age: 76
Discharge: HOME | End: 2021-10-07

## 2021-10-07 DIAGNOSIS — C61 PROSTATE CANCER (HCC): ICD-10-CM

## 2021-10-07 DIAGNOSIS — I73.9 PAD (PERIPHERAL ARTERY DISEASE) (HCC): ICD-10-CM

## 2021-10-19 ENCOUNTER — TELEPHONE (OUTPATIENT)
Dept: INTERNAL MEDICINE CLINIC | Facility: CLINIC | Age: 76
End: 2021-10-19

## 2021-10-19 ENCOUNTER — HOSPITAL ENCOUNTER (OUTPATIENT)
Dept: NON INVASIVE DIAGNOSTICS | Facility: CLINIC | Age: 76
Discharge: HOME | End: 2021-10-19
Payer: COMMERCIAL

## 2021-10-19 DIAGNOSIS — I73.9 PAD (PERIPHERAL ARTERY DISEASE) (HCC): ICD-10-CM

## 2021-10-19 DIAGNOSIS — Z98.890 POSTOPERATIVE STATE: ICD-10-CM

## 2021-10-19 PROCEDURE — 93923 UPR/LXTR ART STDY 3+ LVLS: CPT

## 2021-10-19 PROCEDURE — 93925 LOWER EXTREMITY STUDY: CPT | Performed by: SURGERY

## 2021-10-19 PROCEDURE — 93922 UPR/L XTREMITY ART 2 LEVELS: CPT | Performed by: SURGERY

## 2021-10-19 PROCEDURE — 93925 LOWER EXTREMITY STUDY: CPT

## 2021-10-20 ENCOUNTER — HOSPITAL ENCOUNTER (OUTPATIENT)
Dept: RADIOLOGY | Age: 76
Discharge: HOME | End: 2021-10-20
Payer: COMMERCIAL

## 2021-10-20 DIAGNOSIS — C61 PROSTATE CANCER (HCC): ICD-10-CM

## 2021-10-20 PROCEDURE — 78815 PET IMAGE W/CT SKULL-THIGH: CPT

## 2021-10-20 PROCEDURE — A9588 FLUCICLOVINE F-18: HCPCS

## 2021-10-20 PROCEDURE — G1004 CDSM NDSC: HCPCS

## 2021-10-22 ENCOUNTER — TELEPHONE (OUTPATIENT)
Dept: HEMATOLOGY ONCOLOGY | Facility: CLINIC | Age: 76
End: 2021-10-22

## 2021-10-22 ENCOUNTER — TELEPHONE (OUTPATIENT)
Dept: HEMATOLOGY ONCOLOGY | Facility: HOSPITAL | Age: 76
End: 2021-10-22

## 2021-10-22 ENCOUNTER — DOCUMENTATION (OUTPATIENT)
Dept: HEMATOLOGY ONCOLOGY | Facility: CLINIC | Age: 76
End: 2021-10-22

## 2021-10-22 DIAGNOSIS — C61 PROSTATE CANCER (HCC): Primary | ICD-10-CM

## 2021-10-22 RX ORDER — PROCHLORPERAZINE MALEATE 10 MG
10 TABLET ORAL EVERY 6 HOURS PRN
Qty: 45 TABLET | Refills: 3 | Status: SHIPPED | OUTPATIENT
Start: 2021-10-22 | End: 2021-11-22 | Stop reason: SDUPTHER

## 2021-10-24 DIAGNOSIS — D64.9 ANEMIA, UNSPECIFIED TYPE: ICD-10-CM

## 2021-10-26 RX ORDER — FERROUS SULFATE 325(65) MG
325 TABLET ORAL 2 TIMES DAILY WITH MEALS
Qty: 60 TABLET | Refills: 1 | OUTPATIENT
Start: 2021-10-26

## 2021-10-27 ENCOUNTER — OFFICE VISIT (OUTPATIENT)
Dept: HEMATOLOGY ONCOLOGY | Facility: CLINIC | Age: 76
End: 2021-10-27
Payer: COMMERCIAL

## 2021-10-27 VITALS
BODY MASS INDEX: 34.06 KG/M2 | RESPIRATION RATE: 18 BRPM | SYSTOLIC BLOOD PRESSURE: 128 MMHG | DIASTOLIC BLOOD PRESSURE: 72 MMHG | HEIGHT: 67 IN | WEIGHT: 217 LBS | OXYGEN SATURATION: 98 % | HEART RATE: 88 BPM | TEMPERATURE: 97.8 F

## 2021-10-27 DIAGNOSIS — C61 PROSTATE CANCER (HCC): Primary | ICD-10-CM

## 2021-10-27 PROCEDURE — 1160F RVW MEDS BY RX/DR IN RCRD: CPT | Performed by: INTERNAL MEDICINE

## 2021-10-27 PROCEDURE — 1036F TOBACCO NON-USER: CPT | Performed by: INTERNAL MEDICINE

## 2021-10-27 PROCEDURE — 99214 OFFICE O/P EST MOD 30 MIN: CPT | Performed by: INTERNAL MEDICINE

## 2021-10-28 ENCOUNTER — TELEPHONE (OUTPATIENT)
Dept: HEMATOLOGY ONCOLOGY | Facility: CLINIC | Age: 76
End: 2021-10-28

## 2021-11-01 ENCOUNTER — APPOINTMENT (OUTPATIENT)
Dept: LAB | Facility: CLINIC | Age: 76
End: 2021-11-01
Payer: COMMERCIAL

## 2021-11-01 ENCOUNTER — HOSPITAL ENCOUNTER (OUTPATIENT)
Dept: INFUSION CENTER | Facility: CLINIC | Age: 76
Discharge: HOME | End: 2021-11-01
Payer: COMMERCIAL

## 2021-11-01 DIAGNOSIS — C61 PROSTATE CANCER (HCC): Primary | ICD-10-CM

## 2021-11-01 DIAGNOSIS — C61 PROSTATE CANCER (HCC): ICD-10-CM

## 2021-11-01 LAB
ALBUMIN SERPL BCP-MCNC: 3.8 G/DL (ref 3.5–5)
ALP SERPL-CCNC: 105 U/L (ref 46–116)
ALT SERPL W P-5'-P-CCNC: 24 U/L (ref 12–78)
ANION GAP SERPL CALCULATED.3IONS-SCNC: 14 MMOL/L (ref 4–13)
AST SERPL W P-5'-P-CCNC: 14 U/L (ref 5–45)
BASOPHILS # BLD AUTO: 0.05 THOUSANDS/ΜL (ref 0–0.1)
BASOPHILS NFR BLD AUTO: 1 % (ref 0–1)
BILIRUB SERPL-MCNC: 0.7 MG/DL (ref 0.2–1)
BUN SERPL-MCNC: 18 MG/DL (ref 5–25)
CALCIUM SERPL-MCNC: 8.5 MG/DL (ref 8.3–10.1)
CHLORIDE SERPL-SCNC: 107 MMOL/L (ref 100–108)
CO2 SERPL-SCNC: 21 MMOL/L (ref 21–32)
CREAT SERPL-MCNC: 1.06 MG/DL (ref 0.6–1.3)
EOSINOPHIL # BLD AUTO: 0.21 THOUSAND/ΜL (ref 0–0.61)
EOSINOPHIL NFR BLD AUTO: 3 % (ref 0–6)
ERYTHROCYTE [DISTWIDTH] IN BLOOD BY AUTOMATED COUNT: 15.2 % (ref 11.6–15.1)
GFR SERPL CREATININE-BSD FRML MDRD: 68 ML/MIN/1.73SQ M
GLUCOSE SERPL-MCNC: 115 MG/DL (ref 65–140)
HCT VFR BLD AUTO: 36.7 % (ref 36.5–49.3)
HGB BLD-MCNC: 11.1 G/DL (ref 12–17)
IMM GRANULOCYTES # BLD AUTO: 0.04 THOUSAND/UL (ref 0–0.2)
IMM GRANULOCYTES NFR BLD AUTO: 1 % (ref 0–2)
LYMPHOCYTES # BLD AUTO: 1.57 THOUSANDS/ΜL (ref 0.6–4.47)
LYMPHOCYTES NFR BLD AUTO: 23 % (ref 14–44)
MCH RBC QN AUTO: 25.6 PG (ref 26.8–34.3)
MCHC RBC AUTO-ENTMCNC: 30.2 G/DL (ref 31.4–37.4)
MCV RBC AUTO: 85 FL (ref 82–98)
MONOCYTES # BLD AUTO: 0.62 THOUSAND/ΜL (ref 0.17–1.22)
MONOCYTES NFR BLD AUTO: 9 % (ref 4–12)
NEUTROPHILS # BLD AUTO: 4.38 THOUSANDS/ΜL (ref 1.85–7.62)
NEUTS SEG NFR BLD AUTO: 63 % (ref 43–75)
NRBC BLD AUTO-RTO: 0 /100 WBCS
PLATELET # BLD AUTO: 233 THOUSANDS/UL (ref 149–390)
PMV BLD AUTO: 10 FL (ref 8.9–12.7)
POTASSIUM SERPL-SCNC: 4 MMOL/L (ref 3.5–5.3)
PROT SERPL-MCNC: 6.7 G/DL (ref 6.4–8.2)
PSA SERPL-MCNC: 32.3 NG/ML (ref 0–4)
RBC # BLD AUTO: 4.33 MILLION/UL (ref 3.88–5.62)
SODIUM SERPL-SCNC: 142 MMOL/L (ref 136–145)
WBC # BLD AUTO: 6.87 THOUSAND/UL (ref 4.31–10.16)

## 2021-11-01 PROCEDURE — 84153 ASSAY OF PSA TOTAL: CPT

## 2021-11-01 PROCEDURE — 36415 COLL VENOUS BLD VENIPUNCTURE: CPT

## 2021-11-01 PROCEDURE — 96402 CHEMO HORMON ANTINEOPL SQ/IM: CPT

## 2021-11-01 PROCEDURE — 85025 COMPLETE CBC W/AUTO DIFF WBC: CPT

## 2021-11-01 PROCEDURE — 80053 COMPREHEN METABOLIC PANEL: CPT

## 2021-11-01 RX ADMIN — LEUPROLIDE ACETATE 22.5 MG: KIT at 13:52

## 2021-11-04 ENCOUNTER — HOSPITAL ENCOUNTER (OUTPATIENT)
Dept: RADIOLOGY | Age: 76
Discharge: HOME | End: 2021-11-04
Payer: COMMERCIAL

## 2021-11-04 DIAGNOSIS — C61 MALIGNANT NEOPLASM OF PROSTATE (HCC): ICD-10-CM

## 2021-11-04 PROCEDURE — 79101 NUCLEAR RX IV ADMIN: CPT

## 2021-11-04 PROCEDURE — A9606 RADIUM RA223 DICHLORIDE THER: HCPCS

## 2021-11-10 DIAGNOSIS — I50.22 CHRONIC HFREF (HEART FAILURE WITH REDUCED EJECTION FRACTION) (HCC): ICD-10-CM

## 2021-11-10 DIAGNOSIS — I73.9 PAD (PERIPHERAL ARTERY DISEASE) (HCC): ICD-10-CM

## 2021-11-10 RX ORDER — GABAPENTIN 100 MG/1
300 CAPSULE ORAL 3 TIMES DAILY
Qty: 90 CAPSULE | Refills: 3 | Status: SHIPPED | OUTPATIENT
Start: 2021-11-10 | End: 2021-12-27 | Stop reason: SDUPTHER

## 2021-11-11 RX ORDER — METOPROLOL SUCCINATE 25 MG/1
TABLET, EXTENDED RELEASE ORAL
Qty: 90 TABLET | Refills: 2 | Status: SHIPPED | OUTPATIENT
Start: 2021-11-11 | End: 2022-07-27

## 2021-11-15 ENCOUNTER — APPOINTMENT (OUTPATIENT)
Dept: LAB | Facility: CLINIC | Age: 76
End: 2021-11-15
Payer: COMMERCIAL

## 2021-11-16 ENCOUNTER — HOSPITAL ENCOUNTER (OUTPATIENT)
Dept: INFUSION CENTER | Facility: CLINIC | Age: 76
Discharge: HOME | End: 2021-11-16
Payer: COMMERCIAL

## 2021-11-16 DIAGNOSIS — I99.8 ISCHEMIC LEG PAIN: ICD-10-CM

## 2021-11-16 DIAGNOSIS — Z95.828 PORT-A-CATH IN PLACE: Primary | ICD-10-CM

## 2021-11-16 DIAGNOSIS — M79.606 ISCHEMIC LEG PAIN: ICD-10-CM

## 2021-11-16 PROCEDURE — 96523 IRRIG DRUG DELIVERY DEVICE: CPT

## 2021-11-16 RX ORDER — METHOCARBAMOL 500 MG/1
500 TABLET, FILM COATED ORAL EVERY 6 HOURS SCHEDULED
Qty: 120 TABLET | Refills: 0 | Status: SHIPPED | OUTPATIENT
Start: 2021-11-16 | End: 2021-12-15

## 2021-11-17 ENCOUNTER — HOSPITAL ENCOUNTER (OUTPATIENT)
Dept: NON INVASIVE DIAGNOSTICS | Facility: CLINIC | Age: 76
Discharge: HOME | End: 2021-11-17
Payer: COMMERCIAL

## 2021-11-17 VITALS
SYSTOLIC BLOOD PRESSURE: 128 MMHG | HEIGHT: 67 IN | WEIGHT: 217 LBS | BODY MASS INDEX: 34.06 KG/M2 | DIASTOLIC BLOOD PRESSURE: 72 MMHG | HEART RATE: 88 BPM

## 2021-11-17 DIAGNOSIS — I50.22 CHRONIC HFREF (HEART FAILURE WITH REDUCED EJECTION FRACTION) (HCC): ICD-10-CM

## 2021-11-17 LAB
AORTIC VALVE MEAN VELOCITY: 8.5 M/S
APICAL FOUR CHAMBER EJECTION FRACTION: 22 %
ASCENDING AORTA: 4 CM
AV LVOT MEAN GRADIENT: 1 MMHG
AV LVOT PEAK GRADIENT: 2 MMHG
AV MEAN GRADIENT: 3 MMHG
AV PEAK GRADIENT: 6 MMHG
AV REGURGITATION PRESSURE HALF TIME: 0.34 MS
DOP CALC AO VTI: 25.34 CM
DOP CALC LVOT PEAK VEL VTI: 16.36 CM
DOP CALC LVOT PEAK VEL: 0.75 M/S
DOP CALC RVOT PEAK VEL: 0.55 M/S
E WAVE DECELERATION TIME: 149 MS
FRACTIONAL SHORTENING: 18 % (ref 28–44)
INTERVENTRICULAR SEPTUM IN DIASTOLE (PARASTERNAL SHORT AXIS VIEW): 1.1 CM
LEFT ATRIUM AREA SYSTOLE SINGLE PLANE A4C: 22 CM2
LEFT INTERNAL DIMENSION IN SYSTOLE: 5 CM (ref 2.1–4)
LEFT VENTRICULAR INTERNAL DIMENSION IN DIASTOLE: 6.1 CM (ref 6.06–9.03)
LEFT VENTRICULAR POSTERIOR WALL IN END DIASTOLE: 1.1 CM
LEFT VENTRICULAR STROKE VOLUME: 69 ML
MV E'TISSUE VEL-SEP: 4 CM/S
MV PEAK A VEL: 0.79 M/S
MV PEAK E VEL: 109 CM/S
MV STENOSIS PRESSURE HALF TIME: 0 MS
PV PEAK GRADIENT: 2 MMHG
RIGHT ATRIUM AREA SYSTOLE A4C: 19.5 CM2
RIGHT VENTRICLE ID DIMENSION: 4.5 CM
SL CV AV DECELERATION TIME RETROGRADE: 1157 MS
SL CV AV PEAK GRADIENT RETROGRADE: 47 MMHG
SL CV LV EF: 25
SL CV PED ECHO LEFT VENTRICLE DIASTOLIC VOLUME (MOD BIPLANE) 2D: 186 ML
SL CV PED ECHO LEFT VENTRICLE SYSTOLIC VOLUME (MOD BIPLANE) 2D: 117 ML
SL CV RVOT MAX GRADIENT: 1 MMHG
TRICUSPID VALVE PEAK REGURGITATION VELOCITY: 2.63 M/S
TRICUSPID VALVE S': 0.8 CM/S
TV PEAK GRADIENT: 28 MMHG
Z-SCORE OF LEFT VENTRICULAR DIMENSION IN END SYSTOLE: -1.93

## 2021-11-17 PROCEDURE — C8929 TTE W OR WO FOL WCON,DOPPLER: HCPCS

## 2021-11-17 PROCEDURE — 93306 TTE W/DOPPLER COMPLETE: CPT | Performed by: INTERNAL MEDICINE

## 2021-11-17 RX ADMIN — PERFLUTREN 0.5 ML/MIN: 6.52 INJECTION, SUSPENSION INTRAVENOUS at 09:10

## 2021-11-18 ENCOUNTER — TELEPHONE (OUTPATIENT)
Dept: CARDIOLOGY CLINIC | Facility: CLINIC | Age: 76
End: 2021-11-18

## 2021-11-22 DIAGNOSIS — C61 PROSTATE CANCER (HCC): ICD-10-CM

## 2021-11-22 RX ORDER — PROCHLORPERAZINE MALEATE 10 MG
10 TABLET ORAL EVERY 6 HOURS PRN
Qty: 45 TABLET | Refills: 3 | Status: SHIPPED | OUTPATIENT
Start: 2021-11-22 | End: 2021-12-21 | Stop reason: SDUPTHER

## 2021-11-29 ENCOUNTER — APPOINTMENT (OUTPATIENT)
Dept: LAB | Facility: CLINIC | Age: 76
End: 2021-11-29
Payer: COMMERCIAL

## 2021-12-02 ENCOUNTER — HOSPITAL ENCOUNTER (OUTPATIENT)
Dept: RADIOLOGY | Age: 76
Discharge: HOME | End: 2021-12-02

## 2021-12-02 ENCOUNTER — TELEPHONE (OUTPATIENT)
Dept: INTERNAL MEDICINE CLINIC | Facility: CLINIC | Age: 76
End: 2021-12-02

## 2021-12-02 DIAGNOSIS — C61 MALIGNANT NEOPLASM OF PROSTATE (HCC): ICD-10-CM

## 2021-12-06 ENCOUNTER — OFFICE VISIT (OUTPATIENT)
Dept: CARDIOLOGY CLINIC | Facility: CLINIC | Age: 76
End: 2021-12-06
Payer: COMMERCIAL

## 2021-12-06 VITALS
BODY MASS INDEX: 32.8 KG/M2 | DIASTOLIC BLOOD PRESSURE: 60 MMHG | SYSTOLIC BLOOD PRESSURE: 88 MMHG | WEIGHT: 209 LBS | HEIGHT: 67 IN | HEART RATE: 76 BPM

## 2021-12-06 DIAGNOSIS — I25.5 ISCHEMIC CARDIOMYOPATHY: ICD-10-CM

## 2021-12-06 PROCEDURE — 1160F RVW MEDS BY RX/DR IN RCRD: CPT | Performed by: INTERNAL MEDICINE

## 2021-12-06 PROCEDURE — 1036F TOBACCO NON-USER: CPT | Performed by: INTERNAL MEDICINE

## 2021-12-06 PROCEDURE — 93000 ELECTROCARDIOGRAM COMPLETE: CPT | Performed by: INTERNAL MEDICINE

## 2021-12-06 PROCEDURE — 99204 OFFICE O/P NEW MOD 45 MIN: CPT | Performed by: INTERNAL MEDICINE

## 2021-12-10 ENCOUNTER — TELEPHONE (OUTPATIENT)
Dept: CARDIOLOGY CLINIC | Facility: CLINIC | Age: 76
End: 2021-12-10

## 2021-12-10 ENCOUNTER — TELEPHONE (OUTPATIENT)
Dept: HEMATOLOGY ONCOLOGY | Facility: CLINIC | Age: 76
End: 2021-12-10

## 2021-12-10 ENCOUNTER — PREP FOR PROCEDURE (OUTPATIENT)
Dept: CARDIOLOGY CLINIC | Facility: CLINIC | Age: 76
End: 2021-12-10

## 2021-12-10 DIAGNOSIS — I50.22 CHRONIC HFREF (HEART FAILURE WITH REDUCED EJECTION FRACTION) (HCC): Primary | ICD-10-CM

## 2021-12-11 DIAGNOSIS — D64.9 ANEMIA, UNSPECIFIED TYPE: ICD-10-CM

## 2021-12-11 DIAGNOSIS — I99.8 ISCHEMIC LEG PAIN: ICD-10-CM

## 2021-12-11 DIAGNOSIS — M79.606 ISCHEMIC LEG PAIN: ICD-10-CM

## 2021-12-14 ENCOUNTER — APPOINTMENT (OUTPATIENT)
Dept: LAB | Facility: CLINIC | Age: 76
End: 2021-12-14
Payer: COMMERCIAL

## 2021-12-14 DIAGNOSIS — I50.22 CHRONIC HFREF (HEART FAILURE WITH REDUCED EJECTION FRACTION) (HCC): ICD-10-CM

## 2021-12-14 LAB
ALBUMIN SERPL BCP-MCNC: 3.8 G/DL (ref 3.5–5)
ALP SERPL-CCNC: 71 U/L (ref 46–116)
ALT SERPL W P-5'-P-CCNC: 25 U/L (ref 12–78)
ANION GAP SERPL CALCULATED.3IONS-SCNC: 12 MMOL/L (ref 4–13)
AST SERPL W P-5'-P-CCNC: 13 U/L (ref 5–45)
BILIRUB SERPL-MCNC: 0.85 MG/DL (ref 0.2–1)
BUN SERPL-MCNC: 14 MG/DL (ref 5–25)
CALCIUM SERPL-MCNC: 8.9 MG/DL (ref 8.3–10.1)
CHLORIDE SERPL-SCNC: 104 MMOL/L (ref 100–108)
CO2 SERPL-SCNC: 25 MMOL/L (ref 21–32)
CREAT SERPL-MCNC: 0.95 MG/DL (ref 0.6–1.3)
GFR SERPL CREATININE-BSD FRML MDRD: 77 ML/MIN/1.73SQ M
GLUCOSE SERPL-MCNC: 128 MG/DL (ref 65–140)
POTASSIUM SERPL-SCNC: 3.6 MMOL/L (ref 3.5–5.3)
PROT SERPL-MCNC: 6.8 G/DL (ref 6.4–8.2)
SODIUM SERPL-SCNC: 141 MMOL/L (ref 136–145)

## 2021-12-14 PROCEDURE — 80053 COMPREHEN METABOLIC PANEL: CPT

## 2021-12-15 ENCOUNTER — TELEPHONE (OUTPATIENT)
Dept: CARDIOLOGY CLINIC | Facility: CLINIC | Age: 76
End: 2021-12-15

## 2021-12-15 RX ORDER — METHOCARBAMOL 500 MG/1
TABLET, FILM COATED ORAL
Qty: 120 TABLET | Refills: 0 | Status: SHIPPED | OUTPATIENT
Start: 2021-12-15 | End: 2022-01-19

## 2021-12-15 RX ORDER — FERROUS SULFATE 325(65) MG
325 TABLET ORAL 2 TIMES DAILY WITH MEALS
Qty: 60 TABLET | Refills: 1 | Status: SHIPPED | OUTPATIENT
Start: 2021-12-15 | End: 2022-01-17

## 2021-12-21 DIAGNOSIS — C61 PROSTATE CANCER (HCC): ICD-10-CM

## 2021-12-21 RX ORDER — PROCHLORPERAZINE MALEATE 10 MG
10 TABLET ORAL EVERY 6 HOURS PRN
Qty: 45 TABLET | Refills: 3 | Status: SHIPPED | OUTPATIENT
Start: 2021-12-21 | End: 2022-03-28 | Stop reason: SDUPTHER

## 2021-12-22 ENCOUNTER — ANESTHESIA EVENT (OUTPATIENT)
Dept: NON INVASIVE DIAGNOSTICS | Facility: HOSPITAL | Age: 76
End: 2021-12-22
Payer: COMMERCIAL

## 2021-12-23 ENCOUNTER — HOSPITAL ENCOUNTER (OUTPATIENT)
Facility: HOSPITAL | Age: 76
Setting detail: OUTPATIENT SURGERY
Discharge: HOME | End: 2021-12-23
Attending: INTERNAL MEDICINE | Admitting: INTERNAL MEDICINE
Payer: COMMERCIAL

## 2021-12-23 ENCOUNTER — ANESTHESIA (OUTPATIENT)
Dept: NON INVASIVE DIAGNOSTICS | Facility: HOSPITAL | Age: 76
End: 2021-12-23
Payer: COMMERCIAL

## 2021-12-23 ENCOUNTER — APPOINTMENT (OUTPATIENT)
Dept: RADIOLOGY | Facility: HOSPITAL | Age: 76
End: 2021-12-23
Payer: COMMERCIAL

## 2021-12-23 VITALS
OXYGEN SATURATION: 97 % | SYSTOLIC BLOOD PRESSURE: 107 MMHG | DIASTOLIC BLOOD PRESSURE: 60 MMHG | RESPIRATION RATE: 18 BRPM | TEMPERATURE: 97.8 F | HEART RATE: 76 BPM

## 2021-12-23 DIAGNOSIS — I50.22 CHRONIC HFREF (HEART FAILURE WITH REDUCED EJECTION FRACTION) (HCC): ICD-10-CM

## 2021-12-23 LAB
ANION GAP SERPL CALCULATED.3IONS-SCNC: 6 MMOL/L (ref 4–13)
ATRIAL RATE: 66 BPM
ATRIAL RATE: 71 BPM
BASOPHILS # BLD AUTO: 0.04 THOUSANDS/ΜL (ref 0–0.1)
BASOPHILS NFR BLD AUTO: 1 % (ref 0–1)
BUN SERPL-MCNC: 15 MG/DL (ref 5–25)
CALCIUM SERPL-MCNC: 9.1 MG/DL (ref 8.3–10.1)
CHLORIDE SERPL-SCNC: 108 MMOL/L (ref 100–108)
CO2 SERPL-SCNC: 27 MMOL/L (ref 21–32)
CREAT SERPL-MCNC: 0.92 MG/DL (ref 0.6–1.3)
EOSINOPHIL # BLD AUTO: 0.24 THOUSAND/ΜL (ref 0–0.61)
EOSINOPHIL NFR BLD AUTO: 5 % (ref 0–6)
ERYTHROCYTE [DISTWIDTH] IN BLOOD BY AUTOMATED COUNT: 15 % (ref 11.6–15.1)
GFR SERPL CREATININE-BSD FRML MDRD: 80 ML/MIN/1.73SQ M
GLUCOSE P FAST SERPL-MCNC: 96 MG/DL (ref 65–99)
GLUCOSE SERPL-MCNC: 96 MG/DL (ref 65–140)
HCT VFR BLD AUTO: 37.3 % (ref 36.5–49.3)
HGB BLD-MCNC: 11.6 G/DL (ref 12–17)
IMM GRANULOCYTES # BLD AUTO: 0.03 THOUSAND/UL (ref 0–0.2)
IMM GRANULOCYTES NFR BLD AUTO: 1 % (ref 0–2)
INR PPP: 0.98 (ref 0.84–1.19)
LYMPHOCYTES # BLD AUTO: 1.32 THOUSANDS/ΜL (ref 0.6–4.47)
LYMPHOCYTES NFR BLD AUTO: 25 % (ref 14–44)
MCH RBC QN AUTO: 26.7 PG (ref 26.8–34.3)
MCHC RBC AUTO-ENTMCNC: 31.1 G/DL (ref 31.4–37.4)
MCV RBC AUTO: 86 FL (ref 82–98)
MONOCYTES # BLD AUTO: 0.44 THOUSAND/ΜL (ref 0.17–1.22)
MONOCYTES NFR BLD AUTO: 8 % (ref 4–12)
NEUTROPHILS # BLD AUTO: 3.17 THOUSANDS/ΜL (ref 1.85–7.62)
NEUTS SEG NFR BLD AUTO: 60 % (ref 43–75)
NRBC BLD AUTO-RTO: 0 /100 WBCS
P AXIS: 44 DEGREES
P AXIS: 47 DEGREES
PLATELET # BLD AUTO: 209 THOUSANDS/UL (ref 149–390)
PMV BLD AUTO: 10.3 FL (ref 8.9–12.7)
POTASSIUM SERPL-SCNC: 3.7 MMOL/L (ref 3.5–5.3)
PR INTERVAL: 174 MS
PR INTERVAL: 182 MS
PROTHROMBIN TIME: 12.6 SECONDS (ref 11.6–14.5)
QRS AXIS: -29 DEGREES
QRS AXIS: 16 DEGREES
QRSD INTERVAL: 142 MS
QRSD INTERVAL: 158 MS
QT INTERVAL: 456 MS
QT INTERVAL: 474 MS
QTC INTERVAL: 478 MS
QTC INTERVAL: 515 MS
RBC # BLD AUTO: 4.35 MILLION/UL (ref 3.88–5.62)
SODIUM SERPL-SCNC: 141 MMOL/L (ref 136–145)
T WAVE AXIS: -27 DEGREES
T WAVE AXIS: 139 DEGREES
VENTRICULAR RATE: 66 BPM
VENTRICULAR RATE: 71 BPM
WBC # BLD AUTO: 5.24 THOUSAND/UL (ref 4.31–10.16)

## 2021-12-23 PROCEDURE — 76937 US GUIDE VASCULAR ACCESS: CPT | Performed by: INTERNAL MEDICINE

## 2021-12-23 PROCEDURE — 33225 L VENTRIC PACING LEAD ADD-ON: CPT | Performed by: INTERNAL MEDICINE

## 2021-12-23 PROCEDURE — C1777 LEAD, AICD, ENDO SINGLE COIL: HCPCS | Performed by: INTERNAL MEDICINE

## 2021-12-23 PROCEDURE — C1769 GUIDE WIRE: HCPCS | Performed by: INTERNAL MEDICINE

## 2021-12-23 PROCEDURE — C1892 INTRO/SHEATH,FIXED,PEEL-AWAY: HCPCS | Performed by: INTERNAL MEDICINE

## 2021-12-23 PROCEDURE — C1898 LEAD, PMKR, OTHER THAN TRANS: HCPCS | Performed by: INTERNAL MEDICINE

## 2021-12-23 PROCEDURE — 33249 INSJ/RPLCMT DEFIB W/LEAD(S): CPT | Performed by: INTERNAL MEDICINE

## 2021-12-23 PROCEDURE — 80048 BASIC METABOLIC PNL TOTAL CA: CPT | Performed by: PHYSICIAN ASSISTANT

## 2021-12-23 PROCEDURE — 71045 X-RAY EXAM CHEST 1 VIEW: CPT

## 2021-12-23 PROCEDURE — 93005 ELECTROCARDIOGRAM TRACING: CPT

## 2021-12-23 PROCEDURE — 85025 COMPLETE CBC W/AUTO DIFF WBC: CPT | Performed by: PHYSICIAN ASSISTANT

## 2021-12-23 PROCEDURE — C1751 CATH, INF, PER/CENT/MIDLINE: HCPCS | Performed by: INTERNAL MEDICINE

## 2021-12-23 PROCEDURE — C1887 CATHETER, GUIDING: HCPCS | Performed by: INTERNAL MEDICINE

## 2021-12-23 PROCEDURE — C1882 AICD, OTHER THAN SING/DUAL: HCPCS | Performed by: INTERNAL MEDICINE

## 2021-12-23 PROCEDURE — C1730 CATH, EP, 19 OR FEW ELECT: HCPCS | Performed by: INTERNAL MEDICINE

## 2021-12-23 PROCEDURE — 85610 PROTHROMBIN TIME: CPT | Performed by: PHYSICIAN ASSISTANT

## 2021-12-23 PROCEDURE — 93010 ELECTROCARDIOGRAM REPORT: CPT | Performed by: INTERNAL MEDICINE

## 2021-12-23 PROCEDURE — C1900 LEAD, CORONARY VENOUS: HCPCS | Performed by: INTERNAL MEDICINE

## 2021-12-23 DEVICE — LEFT HEART LEAD
Type: IMPLANTABLE DEVICE | Site: HEART | Status: FUNCTIONAL
Brand: QUARTET™

## 2021-12-23 DEVICE — CARDIAC RESYNCHRONISATION THERAPY DEFIBRILLATOR
Type: IMPLANTABLE DEVICE | Site: CHEST | Status: FUNCTIONAL
Brand: GALLANT™

## 2021-12-23 DEVICE — PACING LEAD
Type: IMPLANTABLE DEVICE | Site: HEART | Status: FUNCTIONAL
Brand: TENDRIL™

## 2021-12-23 DEVICE — DEFIBRILLATION LEAD
Type: IMPLANTABLE DEVICE | Site: HEART | Status: FUNCTIONAL
Brand: OPTISURE™

## 2021-12-23 DEVICE — ENVELOPE CMRM6133 ABSORB LRG MR
Type: IMPLANTABLE DEVICE | Site: CHEST | Status: FUNCTIONAL
Brand: TYRX™

## 2021-12-23 RX ORDER — DEXAMETHASONE SODIUM PHOSPHATE 10 MG/ML
INJECTION, SOLUTION INTRAMUSCULAR; INTRAVENOUS AS NEEDED
Status: DISCONTINUED | OUTPATIENT
Start: 2021-12-23 | End: 2021-12-23

## 2021-12-23 RX ORDER — CEFAZOLIN SODIUM 2 G/50ML
2000 SOLUTION INTRAVENOUS ONCE
Status: COMPLETED | OUTPATIENT
Start: 2021-12-23 | End: 2021-12-23

## 2021-12-23 RX ORDER — CHLORHEXIDINE GLUCONATE 0.12 MG/ML
15 RINSE ORAL ONCE
Status: DISCONTINUED | OUTPATIENT
Start: 2021-12-23 | End: 2021-12-23 | Stop reason: HOSPADM

## 2021-12-23 RX ORDER — ACETAMINOPHEN 325 MG/1
650 TABLET ORAL EVERY 4 HOURS PRN
Status: DISCONTINUED | OUTPATIENT
Start: 2021-12-23 | End: 2021-12-23 | Stop reason: HOSPADM

## 2021-12-23 RX ORDER — PROPOFOL 10 MG/ML
INJECTION, EMULSION INTRAVENOUS CONTINUOUS PRN
Status: DISCONTINUED | OUTPATIENT
Start: 2021-12-23 | End: 2021-12-23

## 2021-12-23 RX ORDER — KETAMINE HYDROCHLORIDE 50 MG/ML
INJECTION, SOLUTION, CONCENTRATE INTRAMUSCULAR; INTRAVENOUS AS NEEDED
Status: DISCONTINUED | OUTPATIENT
Start: 2021-12-23 | End: 2021-12-23

## 2021-12-23 RX ORDER — MIDAZOLAM HYDROCHLORIDE 2 MG/2ML
INJECTION, SOLUTION INTRAMUSCULAR; INTRAVENOUS AS NEEDED
Status: DISCONTINUED | OUTPATIENT
Start: 2021-12-23 | End: 2021-12-23

## 2021-12-23 RX ORDER — ALBUMIN, HUMAN INJ 5% 5 %
SOLUTION INTRAVENOUS CONTINUOUS PRN
Status: DISCONTINUED | OUTPATIENT
Start: 2021-12-23 | End: 2021-12-23

## 2021-12-23 RX ORDER — ONDANSETRON 2 MG/ML
INJECTION INTRAMUSCULAR; INTRAVENOUS AS NEEDED
Status: DISCONTINUED | OUTPATIENT
Start: 2021-12-23 | End: 2021-12-23

## 2021-12-23 RX ORDER — FENTANYL CITRATE 50 UG/ML
INJECTION, SOLUTION INTRAMUSCULAR; INTRAVENOUS AS NEEDED
Status: DISCONTINUED | OUTPATIENT
Start: 2021-12-23 | End: 2021-12-23

## 2021-12-23 RX ORDER — LIDOCAINE HYDROCHLORIDE 10 MG/ML
INJECTION, SOLUTION EPIDURAL; INFILTRATION; INTRACAUDAL; PERINEURAL AS NEEDED
Status: DISCONTINUED | OUTPATIENT
Start: 2021-12-23 | End: 2021-12-23 | Stop reason: HOSPADM

## 2021-12-23 RX ORDER — GENTAMICIN SULFATE 40 MG/ML
INJECTION, SOLUTION INTRAMUSCULAR; INTRAVENOUS AS NEEDED
Status: DISCONTINUED | OUTPATIENT
Start: 2021-12-23 | End: 2021-12-23 | Stop reason: HOSPADM

## 2021-12-23 RX ORDER — NOREPINEPHRINE BITARTRATE 1 MG/ML
INJECTION, SOLUTION INTRAVENOUS AS NEEDED
Status: DISCONTINUED | OUTPATIENT
Start: 2021-12-23 | End: 2021-12-23

## 2021-12-23 RX ORDER — SODIUM CHLORIDE 9 MG/ML
INJECTION, SOLUTION INTRAVENOUS CONTINUOUS PRN
Status: DISCONTINUED | OUTPATIENT
Start: 2021-12-23 | End: 2021-12-23

## 2021-12-23 RX ORDER — GLYCOPYRROLATE 0.2 MG/ML
INJECTION INTRAMUSCULAR; INTRAVENOUS AS NEEDED
Status: DISCONTINUED | OUTPATIENT
Start: 2021-12-23 | End: 2021-12-23

## 2021-12-23 RX ADMIN — KETAMINE HYDROCHLORIDE 20 MG: 50 INJECTION, SOLUTION INTRAMUSCULAR; INTRAVENOUS at 08:33

## 2021-12-23 RX ADMIN — SODIUM CHLORIDE: 0.9 INJECTION, SOLUTION INTRAVENOUS at 07:50

## 2021-12-23 RX ADMIN — Medication 2 MCG/MIN: at 07:56

## 2021-12-23 RX ADMIN — NOREPINEPHRINE BITARTRATE 8 MCG: 1 INJECTION, SOLUTION, CONCENTRATE INTRAVENOUS at 08:13

## 2021-12-23 RX ADMIN — ONDANSETRON 4 MG: 2 INJECTION INTRAMUSCULAR; INTRAVENOUS at 10:21

## 2021-12-23 RX ADMIN — FENTANYL CITRATE 25 MCG: 50 INJECTION INTRAMUSCULAR; INTRAVENOUS at 08:21

## 2021-12-23 RX ADMIN — KETAMINE HYDROCHLORIDE 10 MG: 50 INJECTION, SOLUTION INTRAMUSCULAR; INTRAVENOUS at 09:21

## 2021-12-23 RX ADMIN — NOREPINEPHRINE BITARTRATE 5 MCG/KG/MIN: 1 INJECTION, SOLUTION, CONCENTRATE INTRAVENOUS at 08:17

## 2021-12-23 RX ADMIN — FENTANYL CITRATE 25 MCG: 50 INJECTION INTRAMUSCULAR; INTRAVENOUS at 10:16

## 2021-12-23 RX ADMIN — MIDAZOLAM 2 MG: 1 INJECTION INTRAMUSCULAR; INTRAVENOUS at 07:56

## 2021-12-23 RX ADMIN — ALBUMIN (HUMAN): 12.5 INJECTION, SOLUTION INTRAVENOUS at 10:45

## 2021-12-23 RX ADMIN — CEFAZOLIN SODIUM 2000 MG: 2 SOLUTION INTRAVENOUS at 07:53

## 2021-12-23 RX ADMIN — PROPOFOL 60 MCG/KG/MIN: 10 INJECTION, EMULSION INTRAVENOUS at 08:00

## 2021-12-23 RX ADMIN — FENTANYL CITRATE 25 MCG: 50 INJECTION INTRAMUSCULAR; INTRAVENOUS at 08:33

## 2021-12-23 RX ADMIN — KETAMINE HYDROCHLORIDE 20 MG: 50 INJECTION, SOLUTION INTRAMUSCULAR; INTRAVENOUS at 08:00

## 2021-12-23 RX ADMIN — DEXAMETHASONE SODIUM PHOSPHATE 5 MG: 10 INJECTION, SOLUTION INTRAMUSCULAR; INTRAVENOUS at 08:08

## 2021-12-23 RX ADMIN — ACETAMINOPHEN 650 MG: 325 TABLET, FILM COATED ORAL at 14:01

## 2021-12-23 RX ADMIN — FENTANYL CITRATE 25 MCG: 50 INJECTION INTRAMUSCULAR; INTRAVENOUS at 09:36

## 2021-12-23 RX ADMIN — GLYCOPYRROLATE 0.1 MG: 0.2 INJECTION, SOLUTION INTRAMUSCULAR; INTRAVENOUS at 07:56

## 2021-12-27 ENCOUNTER — APPOINTMENT (OUTPATIENT)
Dept: LAB | Facility: CLINIC | Age: 76
End: 2021-12-27
Payer: COMMERCIAL

## 2021-12-27 DIAGNOSIS — I73.9 PAD (PERIPHERAL ARTERY DISEASE) (HCC): ICD-10-CM

## 2021-12-27 RX ORDER — GABAPENTIN 100 MG/1
300 CAPSULE ORAL 3 TIMES DAILY
Qty: 90 CAPSULE | Refills: 3 | Status: SHIPPED | OUTPATIENT
Start: 2021-12-27 | End: 2022-02-24

## 2021-12-28 ENCOUNTER — HOSPITAL ENCOUNTER (OUTPATIENT)
Dept: INFUSION CENTER | Facility: CLINIC | Age: 76
Discharge: HOME | End: 2021-12-28
Payer: COMMERCIAL

## 2021-12-28 DIAGNOSIS — Z95.828 PORT-A-CATH IN PLACE: Primary | ICD-10-CM

## 2021-12-28 PROCEDURE — 96523 IRRIG DRUG DELIVERY DEVICE: CPT

## 2021-12-28 NOTE — PROGRESS NOTES
Patient to infusion for port flush today  He offers no complaints  Port accessed without issue, good blood return noted, port flushed and saline locked  Port de accessed and band aid applied  Copy of AVS given

## 2021-12-30 ENCOUNTER — HOSPITAL ENCOUNTER (OUTPATIENT)
Dept: RADIOLOGY | Age: 76
Discharge: HOME | End: 2021-12-30
Payer: COMMERCIAL

## 2021-12-30 DIAGNOSIS — C61 MALIGNANT NEOPLASM OF PROSTATE (HCC): ICD-10-CM

## 2021-12-30 PROCEDURE — A9606 RADIUM RA223 DICHLORIDE THER: HCPCS

## 2021-12-30 PROCEDURE — 79101 NUCLEAR RX IV ADMIN: CPT

## 2022-01-01 NOTE — ANESTHESIA POSTPROCEDURE EVALUATION
Post-Op Assessment Note    CV Status:  Stable    Pain management: satisfactory to patient     Mental Status:  Alert and awake   Hydration Status:  Stable   PONV Controlled:  Controlled   Airway Patency:  Patent      Post Op Vitals Reviewed: Yes      Staff: CRNA   Comments: awoke confused in the OR transported on oxygen        No complications documented      BP 92/53 (07/09/21 1715)    Temp 97 9 °F (36 6 °C) (07/09/21 1715)    Pulse 72 (07/09/21 1715)   Resp 20 (07/09/21 1715)    SpO2 98 % (07/09/21 1715) rooting/suck/gordon grasp/plantar grasp/flexor withdrawal/crossed extension/placing

## 2022-01-03 DIAGNOSIS — K59.1 FUNCTIONAL DIARRHEA: Primary | ICD-10-CM

## 2022-01-04 RX ORDER — LOPERAMIDE HYDROCHLORIDE 2 MG/1
2 CAPSULE ORAL 4 TIMES DAILY PRN
Qty: 30 CAPSULE | Refills: 0 | Status: SHIPPED | OUTPATIENT
Start: 2022-01-04 | End: 2022-02-23 | Stop reason: SDUPTHER

## 2022-01-10 ENCOUNTER — APPOINTMENT (OUTPATIENT)
Dept: LAB | Facility: CLINIC | Age: 77
End: 2022-01-10
Payer: COMMERCIAL

## 2022-01-11 ENCOUNTER — IN-CLINIC DEVICE VISIT (OUTPATIENT)
Dept: CARDIOLOGY CLINIC | Facility: CLINIC | Age: 77
End: 2022-01-11

## 2022-01-11 DIAGNOSIS — Z95.810 BIVENTRICULAR ICD (IMPLANTABLE CARDIOVERTER-DEFIBRILLATOR) IN PLACE: Primary | ICD-10-CM

## 2022-01-11 PROCEDURE — 99024 POSTOP FOLLOW-UP VISIT: CPT | Performed by: INTERNAL MEDICINE

## 2022-01-11 NOTE — PROGRESS NOTES
Results for orders placed or performed in visit on 01/11/22   Cardiac EP device report    Narrative    [facility] BI-V ICD -ACTIVE SYSTEM IS MRI CONDITIONAL  DEVICE INTERROGATED IN THE [facility] OFFICE: WOUND CHECK: INCISION CLEAN AND DRY WITH EDGES APPROXIMATED; AQUALCEL BANDAGE REMOVED; WOUND CARE AND RESTRICTIONS REVIEWED WITH PATIENT  BATTERY VOLTAGE ADEQUATE (5 5 YRS)  AP 17% BVP 96% (DDDR 60)  ALL LEAD PARAMETERS WITHIN NORMAL LIMITS  NO SIGNIFICANT HIGH RATE EPISODES  EF 25% 911/2021 ECHO)  PATIENT ON ASA 81, CLOPIDOGREL, METOPROLOL SUCC  CORVUE IMPEDANCE THRESHOLD PROGRAMMED "ON"  NO OTHER PROGRAMMING CHANGES MADE TO DEVICE PARAMETERS  NORMAL DEVICE FUNCTION      ES

## 2022-01-14 DIAGNOSIS — I50.22 CHRONIC HFREF (HEART FAILURE WITH REDUCED EJECTION FRACTION) (HCC): ICD-10-CM

## 2022-01-14 RX ORDER — LOSARTAN POTASSIUM 25 MG/1
TABLET ORAL
Qty: 90 TABLET | Refills: 1 | Status: SHIPPED | OUTPATIENT
Start: 2022-01-14 | End: 2022-05-05

## 2022-01-15 DIAGNOSIS — D64.9 ANEMIA, UNSPECIFIED TYPE: ICD-10-CM

## 2022-01-17 RX ORDER — FERROUS SULFATE 325(65) MG
TABLET ORAL
Qty: 60 TABLET | Refills: 1 | Status: SHIPPED | OUTPATIENT
Start: 2022-01-17 | End: 2022-02-10

## 2022-01-17 NOTE — TELEPHONE ENCOUNTER
Royce Rene has requested a refill of ferrous sulfate 325 (65 Fe) mg tablet  Pt does not meet the requirements placed by [facility]  Would a refill be appropriate?

## 2022-01-18 DIAGNOSIS — I99.8 ISCHEMIC LEG PAIN: ICD-10-CM

## 2022-01-18 DIAGNOSIS — M79.606 ISCHEMIC LEG PAIN: ICD-10-CM

## 2022-01-18 DIAGNOSIS — I25.10 CORONARY ARTERY DISEASE INVOLVING NATIVE HEART WITHOUT ANGINA PECTORIS, UNSPECIFIED VESSEL OR LESION TYPE: ICD-10-CM

## 2022-01-19 RX ORDER — CLOPIDOGREL BISULFATE 75 MG/1
TABLET ORAL
Qty: 90 TABLET | Refills: 1 | Status: SHIPPED | OUTPATIENT
Start: 2022-01-19 | End: 2022-06-27 | Stop reason: SDUPTHER

## 2022-01-19 RX ORDER — METHOCARBAMOL 500 MG/1
TABLET, FILM COATED ORAL
Qty: 120 TABLET | Refills: 0 | Status: SHIPPED | OUTPATIENT
Start: 2022-01-19 | End: 2022-03-08 | Stop reason: SDUPTHER

## 2022-01-24 ENCOUNTER — TELEPHONE (OUTPATIENT)
Dept: HEMATOLOGY ONCOLOGY | Facility: HOSPITAL | Age: 77
End: 2022-01-24

## 2022-01-24 ENCOUNTER — HOSPITAL ENCOUNTER (OUTPATIENT)
Dept: INFUSION CENTER | Facility: CLINIC | Age: 77
Discharge: HOME | End: 2022-01-24
Payer: COMMERCIAL

## 2022-01-24 DIAGNOSIS — Z95.828 PORT-A-CATH IN PLACE: ICD-10-CM

## 2022-01-24 DIAGNOSIS — C61 PROSTATE CANCER (HCC): Primary | ICD-10-CM

## 2022-01-24 LAB
BASOPHILS # BLD AUTO: 0.03 THOUSANDS/ΜL (ref 0–0.1)
BASOPHILS NFR BLD AUTO: 1 % (ref 0–1)
EOSINOPHIL # BLD AUTO: 0.11 THOUSAND/ΜL (ref 0–0.61)
EOSINOPHIL NFR BLD AUTO: 3 % (ref 0–6)
ERYTHROCYTE [DISTWIDTH] IN BLOOD BY AUTOMATED COUNT: 14.9 % (ref 11.6–15.1)
HCT VFR BLD AUTO: 32.8 % (ref 36.5–49.3)
HGB BLD-MCNC: 10.6 G/DL (ref 12–17)
IMM GRANULOCYTES # BLD AUTO: 0.03 THOUSAND/UL (ref 0–0.2)
IMM GRANULOCYTES NFR BLD AUTO: 1 % (ref 0–2)
LYMPHOCYTES # BLD AUTO: 1.15 THOUSANDS/ΜL (ref 0.6–4.47)
LYMPHOCYTES NFR BLD AUTO: 27 % (ref 14–44)
MCH RBC QN AUTO: 27.8 PG (ref 26.8–34.3)
MCHC RBC AUTO-ENTMCNC: 32.3 G/DL (ref 31.4–37.4)
MCV RBC AUTO: 86 FL (ref 82–98)
MONOCYTES # BLD AUTO: 0.42 THOUSAND/ΜL (ref 0.17–1.22)
MONOCYTES NFR BLD AUTO: 10 % (ref 4–12)
NEUTROPHILS # BLD AUTO: 2.58 THOUSANDS/ΜL (ref 1.85–7.62)
NEUTS SEG NFR BLD AUTO: 58 % (ref 43–75)
NRBC BLD AUTO-RTO: 0 /100 WBCS
PLATELET # BLD AUTO: 196 THOUSANDS/UL (ref 149–390)
PMV BLD AUTO: 9.6 FL (ref 8.9–12.7)
RBC # BLD AUTO: 3.81 MILLION/UL (ref 3.88–5.62)
WBC # BLD AUTO: 4.32 THOUSAND/UL (ref 4.31–10.16)

## 2022-01-24 PROCEDURE — 96402 CHEMO HORMON ANTINEOPL SQ/IM: CPT

## 2022-01-24 PROCEDURE — 85025 COMPLETE CBC W/AUTO DIFF WBC: CPT | Performed by: INTERNAL MEDICINE

## 2022-01-24 RX ADMIN — LEUPROLIDE ACETATE 22.5 MG: KIT at 09:38

## 2022-01-24 NOTE — PROGRESS NOTES
Patient arrived for Lupron injection  Patient offers no complaints  Patient also due for a CBC today  Blood work drawn from port  Port flushed per protocol  Patient tolerated injection into right upper outer gluteal area  Next 2 port flush appointments made and new schedule printed for patient

## 2022-01-24 NOTE — TELEPHONE ENCOUNTER
01/24/22    Spoke with patient reminding updated labs prior to appt  Labs:  CMP/ PSA    Advising pt to go to any [facility] walk-in labs to get blood work done  Pt stated he will get labs done tomorrow

## 2022-01-25 ENCOUNTER — OFFICE VISIT (OUTPATIENT)
Dept: CARDIOLOGY CLINIC | Facility: CLINIC | Age: 77
End: 2022-01-25
Payer: COMMERCIAL

## 2022-01-25 ENCOUNTER — APPOINTMENT (OUTPATIENT)
Dept: LAB | Facility: CLINIC | Age: 77
End: 2022-01-25
Payer: COMMERCIAL

## 2022-01-25 VITALS
HEIGHT: 67 IN | HEART RATE: 74 BPM | BODY MASS INDEX: 31.56 KG/M2 | SYSTOLIC BLOOD PRESSURE: 106 MMHG | WEIGHT: 201.1 LBS | DIASTOLIC BLOOD PRESSURE: 54 MMHG | OXYGEN SATURATION: 97 %

## 2022-01-25 DIAGNOSIS — Z95.810 BIVENTRICULAR ICD (IMPLANTABLE CARDIOVERTER-DEFIBRILLATOR) IN PLACE: ICD-10-CM

## 2022-01-25 DIAGNOSIS — C61 PROSTATE CANCER (HCC): ICD-10-CM

## 2022-01-25 DIAGNOSIS — I50.22 CHRONIC HFREF (HEART FAILURE WITH REDUCED EJECTION FRACTION) (HCC): Primary | ICD-10-CM

## 2022-01-25 DIAGNOSIS — I25.10 CORONARY ARTERY DISEASE INVOLVING NATIVE CORONARY ARTERY OF NATIVE HEART WITHOUT ANGINA PECTORIS: ICD-10-CM

## 2022-01-25 DIAGNOSIS — Z95.2 S/P TAVR (TRANSCATHETER AORTIC VALVE REPLACEMENT): ICD-10-CM

## 2022-01-25 LAB
ALBUMIN SERPL BCP-MCNC: 3.8 G/DL (ref 3.5–5)
ALP SERPL-CCNC: 73 U/L (ref 46–116)
ALT SERPL W P-5'-P-CCNC: 26 U/L (ref 12–78)
ANION GAP SERPL CALCULATED.3IONS-SCNC: 9 MMOL/L (ref 4–13)
AST SERPL W P-5'-P-CCNC: 15 U/L (ref 5–45)
BILIRUB SERPL-MCNC: 0.62 MG/DL (ref 0.2–1)
BUN SERPL-MCNC: 14 MG/DL (ref 5–25)
CALCIUM SERPL-MCNC: 9 MG/DL (ref 8.3–10.1)
CHLORIDE SERPL-SCNC: 107 MMOL/L (ref 100–108)
CO2 SERPL-SCNC: 25 MMOL/L (ref 21–32)
CREAT SERPL-MCNC: 0.92 MG/DL (ref 0.6–1.3)
GFR SERPL CREATININE-BSD FRML MDRD: 80 ML/MIN/1.73SQ M
GLUCOSE SERPL-MCNC: 118 MG/DL (ref 65–140)
POTASSIUM SERPL-SCNC: 3.8 MMOL/L (ref 3.5–5.3)
PROT SERPL-MCNC: 6.5 G/DL (ref 6.4–8.2)
PSA SERPL-MCNC: 21 NG/ML (ref 0–4)
SODIUM SERPL-SCNC: 141 MMOL/L (ref 136–145)

## 2022-01-25 PROCEDURE — 84153 ASSAY OF PSA TOTAL: CPT

## 2022-01-25 PROCEDURE — 99214 OFFICE O/P EST MOD 30 MIN: CPT | Performed by: INTERNAL MEDICINE

## 2022-01-25 PROCEDURE — 1160F RVW MEDS BY RX/DR IN RCRD: CPT | Performed by: INTERNAL MEDICINE

## 2022-01-25 PROCEDURE — 80053 COMPREHEN METABOLIC PANEL: CPT

## 2022-01-25 RX ORDER — FUROSEMIDE 20 MG/1
20 TABLET ORAL DAILY
Qty: 30 TABLET | Refills: 3 | Status: SHIPPED | OUTPATIENT
Start: 2022-01-25 | End: 2022-01-31

## 2022-01-25 NOTE — PATIENT INSTRUCTIONS
Decrease lasix to 20mg once a day  2g sodium restriction  2L fluid restriction  Daily weights  Take extra 20mg of furosemide 20mg as needed for weight gain of 3 lbs a day or 5 lbs in 5-7 days  Physical activities as tolerated

## 2022-01-25 NOTE — PROGRESS NOTES
Advanced Heart Failure Outpatient Progress Note - Tanvri Rene 68 y o  male MRN: [id number]    Encounter: [id number]      Assessment/Plan:    Patient Active Problem List    Diagnosis Date Noted    PAD (peripheral artery disease) ([place] [address] ) 07/19/2021    Lower extremity pain 07/11/2021    Urinary retention 07/11/2021    CAD (coronary artery disease) 07/10/2021    Fever 07/10/2021    Hypotension 07/10/2021    Ischemic leg pain 07/07/2021    Prostate cancer ([place] [address] ) 07/07/2021    Chronic midline low back pain 07/01/2021    Ischemic cardiomyopathy 07/01/2021    Chronic combined systolic and diastolic CHF (congestive heart failure) ([place] [address] ) 06/22/2021    S/P AVR 06/22/2021    Anemia 06/22/2021    Port-A-Cath in place 10/05/2021          # Chronic HFrEF; LVEF 20%; LVIDd 5 9 cm; NYHA III; ACC/AHA Stage C  Etiology: ischemic +/- progression of valvular disease +/- chemotherapy (on abiraterone and leuprolide with unclear safety profile)    Euvolemic, warm and and well perfused on exam    Diagnostic:    TTE 06/14/2021 (at [facility] pre- and post-TAVR, per report): LVEF 20%  LVIDd 5 4 cm  "entire apex, mid and apical anterior septum, mid and apical inferior septum, mid and apical inferior wall, mid inferolateral wall, and mid lateral wall are akinetic " Trace MR and TR  Pre-TAVR: LVOT VTI 12 0 cm  JOSE 0 47 cm^2  Post: bioprosthetic AV valve present; JOSE 2 41 cm^2                  TTE 06/23/2021:   LVEF 20%  LVIDd 5 9 cm  Grade 2 DD  aneurysmal LV mid to apex "dyskinesis and aneurysmal deformity of the apical anterior, mid anteroseptal, apical inferior, apical septal, and apical wall(s) "   Normal RV size and RVSF  Mild MR  AV bioprosthesis with normal leaflet separation  Trace TR      TTE 11/17/21:  LVEF: 25%  LVIDd: 6 1cm  RV: normal size and systolic function  MR: mild to moderate   Other: Akinesis and aneurysmal deformity of the mid septum and apex   Grade 2 diastology  Ascending aorta 4cm  TAVR bioprosthetic valve, normally functioning  Mean gradient 3mmHg     Neurohormonal Blockade:  --Beta-Blocker: Metoprolol succinate 25 mg daily  --ACEi, ARB or ARNi: Entresto 24/26 mg BID - switched to losartan 12 5mg daily due to low blood pressure - increased to 25mg daily    (or SVR reduction)  --Aldosterone Receptor Blocker: Spironolactone 12 5 mg daily - off due to low BP  --SGLT2-I:   --Diuretic: Lasix 40 mg daily     Sudden Cardiac Death Risk Reduction:  --ICD: s/p SJM BIV ICD 12/23/21  Interrogation 1/11/22: AP 17% BVP 96% DDDR 60  No significant high rate episodes  Normal device function     Electrical Resynchronization:  -- LBBB QRSd 158 msec preBIV; QRSd 148msec post BIV     Advanced Therapies (If appropriate): --We will continue to monitor     Prostate cancer with bony metastases  Receiving chemotherapy through the [place]  Taking SQ leuprolide (Eligard) and abiraterone (Zytiga) - safety of abiraterone in patients with LVEF <50% not established per FDA  [place]-analysis of drug noted increased incidence and relative risk of CV toxicity      Coronary artery disease  Without active chest pain  S/pstenting to circumflex and RCA in 2001  [facility] 06/09/2021 (per documentation):"diffuse moderate prox and mid disease/ISR, severe OM lesion and total mid LAD "  Rx: aspirin, plavix, statin     Aortic stenosis  S/p TAVR (Evolut Pro+ 29mm) on 06/14/2021 at [facility]  Normally functioning on last echo 11/2021    # RLE ischemia  LEAD with >75% stenosis of the CFA/SFA  s/p right femoral endarterectomy 7/9/21  Continues on ASA, Plavix, and statin  Follows with Dr Bisi Devries      Hyperlipidemia     History of atrial tachycardia: s/p ablation at OSH in 2018    Benign prostatic hyperplasia      TODAY'S PLAN:  Decrease lasix to 20mg once a day  2g sodium restriction  2L fluid restriction  Daily weights  Take extra 20mg of furosemide 20mg as needed for weight gain of 3 lbs a day or 5 lbs in 5-7 days  Physical activities as tolerated        HPI:   Royce BAER Anju is a 44-year-old man with a PMH as aboe who presents for hospital follow-up      Presented to [facility] hospital on 06/03/2021 with chest pressure before chemotherapy treatment for prostate cancer  Sent for testing and noted to be in acute CHF exacerbation and was diuresed  Was then transferred to [address] [place] [address] in [facility]  TTE showed "severely reduced LVEF with LV apical aneurysm and septal/AW AK, RV normal, Vmax at 3 5 (however, it was read as moderate AS and preserved EF in 2019 so likely this is progression of AS in addition to interim AWMI) "  C also done which revealed "diffuse moderate prox and mid disease/ISR, severe OM lesion and total mid LAD "     Transferred to [facility] on 06/13 for TAVR  TAVR completed on 06/14, and patient was discharged home on 06/15/2021  Started on metoprolol succinate this admission (tartrate discontinued)       Admitted to [facility] from 06/22 to 06/25/2021 after presenting with worsening SOB and LE edema for 3 days  WAS without discharge medications from [facility] admission for about 1 week (including Lasix)  NT-proBNP 5157  Cardiology was consulted, TTE was completed, and IV diuresis was started  Reduced LVEF was confirmed and aneurysmal wall motion of apical segments  Transitioned to PO diuretics on day of discharge  Started on Entresto and fit with LifeVest  Lost 10 lbs this admission       07/06/2021: Patient presents for hospital follow-up  Primary complaint today is of dizziness and right LE pain/numbeness and right foot wound  Reports decreased appetite and increased frustration with attempting to adhere to low sodium diet  Did receive "Living With Heart Failure" booklet during admission  Prior to admissions, often would eat canned pastas for his meals  Considering getting Meals of Wheels  Unclear if patient has been taking both tartrate and succinate since discharge  Not wearing LifeVest; dislikes wearing it and left device at home today   Has not been completing daily weights; does not have scale at home  Patient organizes his own meds; VNA in home 2 times weekly  7/16/2021: Patient is here for follow-up  Patient hospitalized 6/17/20July 7-13 due to right lower extremity ischemia  Underwent right femoral endarterectomy on June 9  Furosemide Entresto held prior to surgery due to low blood pressure  Eventually discharged on low-dose losartan 12 5 mg daily due to soft blood pressure  Furosemide 40 mg daily resumed  Patient reports right lower extremity pain and difficulty bearing weight on the right leg  Otherwise denies shortness of breath, chest pain or lightheadedness  9/15/21: Here for follow up  Overall doing good  Mainly complaining of residual numbness/tingling on right leg  No shortness of breath, chest pain, palpitations or lightheadedness  Interval History:  1/25/22: Here for follow up  S/p BIV ICD placement 12/23/21  Reports lightheadedness "all the time"  No feeling faint  Spinning sensation when laying down  Mainly limited by leg pain, not doing much walking  Denied shortness of breath walking from the parking lot to the office and from the waiting area to exam room  Review of Systems   Constitutional: Negative for chills and fever  HENT: Negative for ear pain and sore throat  Eyes: Negative for pain and visual disturbance  Respiratory: Negative for cough, chest tightness and shortness of breath  Cardiovascular: Negative for chest pain, palpitations and leg swelling  Gastrointestinal: Negative for abdominal distention, abdominal pain and vomiting  Genitourinary: Negative for dysuria and hematuria  Musculoskeletal: Negative for arthralgias and back pain  Skin: Negative for color change and rash  Neurological: Negative for seizures and syncope  All other systems reviewed and are negative        Past Medical History:   Diagnosis Date    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    Coronary artery disease 2001    S/p stenting to circumflex and RCA    HFrEF (heart failure with reduced ejection fraction) ([facility]) 06/14/2021    High cholesterol     Prostate cancer ([facility])          No Known Allergies        Current Outpatient Medications:     acetaminophen (TYLENOL) 325 mg tablet, Take 3 tablets (975 mg total) by mouth every 8 (eight) hours, Disp: 90 tablet, Rfl: 0    aspirin 81 mg chewable tablet, Chew 1 tablet (81 mg total) daily, Disp: 30 tablet, Rfl: 0    atorvastatin (LIPITOR) 80 mg tablet, TAKE 1 TABLET BY MOUTH DAILY WITH DINNER, Disp: 90 tablet, Rfl: 1    clopidogrel (PLAVIX) 75 mg tablet, TAKE 1 TABLET BY MOUTH EVERY DAY, Disp: 90 tablet, Rfl: 1    docusate sodium (COLACE) 100 mg capsule, Take 1 capsule (100 mg total) by mouth 2 (two) times a day, Disp: 10 capsule, Rfl: 0    ferrous sulfate 325 (65 Fe) mg tablet, TAKE 1 TABLET BY MOUTH 2 TIMES A DAY WITH MEALS , Disp: 60 tablet, Rfl: 1    furosemide (LASIX) 20 mg tablet, Take 1 tablet (20 mg total) by mouth daily, Disp: 30 tablet, Rfl: 3    gabapentin (NEURONTIN) 100 mg capsule, Take 3 capsules (300 mg total) by mouth 3 (three) times a day, Disp: 90 capsule, Rfl: 3    leuprolide (Eligard) 22 5 mg, 22 5MG/1 SYRINGE UNDER THE SKIN ONE TIME AS DIRECTED EVERY 3 MONTHS, Disp: , Rfl:     lidocaine (LIDODERM) 5 %, Apply 2 patches topically daily Remove & Discard patch within 12 hours or as directed by MD, Disp: 15 patch, Rfl: 0    loperamide (IMODIUM) 2 mg capsule, Take 1 capsule (2 mg total) by mouth 4 (four) times a day as needed for diarrhea, Disp: 30 capsule, Rfl: 0    losartan (COZAAR) 25 mg tablet, TAKE 1 TABLET BY MOUTH EVERY DAY, Disp: 90 tablet, Rfl: 1    methocarbamol (ROBAXIN) 500 mg tablet, TAKE 1 TABLET BY MOUTH EVERY 6 HOURS, Disp: 120 tablet, Rfl: 0    metoprolol succinate (TOPROL-XL) 25 mg 24 hr tablet, TAKE 1 TABLET BY MOUTH EVERY DAY, Disp: 90 tablet, Rfl: 2    omeprazole (PriLOSEC) 20 mg delayed release capsule, Take 1 capsule (20 mg total) by mouth daily, Disp: 90 capsule, Rfl: 1    prochlorperazine (COMPAZINE) 10 mg tablet, Take 1 tablet (10 mg total) by mouth every 6 (six) hours as needed for nausea or vomiting, Disp: 45 tablet, Rfl: 3    traMADol (ULTRAM) 50 mg tablet, Take 50 mg by mouth every 6 (six) hours as needed for moderate pain , Disp: , Rfl:     abiraterone (ZYTIGA) 250 mg tablet, Take 4 tablets (1,000 mg total) by mouth daily (Patient not taking: Reported on 2021), Disp: 30 tablet, Rfl: 2    Social History     Socioeconomic History    Marital status:      Spouse name: Not on file    Number of children: 3    Years of education: Not on file    Highest education level: Not on file   Occupational History    Not on file   Tobacco Use    Smoking status: Former Smoker     Years:      Quit date: 2002     Years since quittin 5    Smokeless tobacco: Never Used   Vaping Use    Vaping Use: Never used   Substance and Sexual Activity    Alcohol use: Not Currently    Drug use: Not on file    Sexual activity: Not on file   Other Topics Concern    Not on file   Social History Narrative    Not on file     Social Determinants of Health     Financial Resource Strain: Not on file   Food Insecurity: Not on file   Transportation Needs: Not on file   Physical Activity: Not on file   Stress: Not on file   Social Connections: Not on file   Intimate Partner Violence: Not on file   Housing Stability: Not on file       History reviewed  No pertinent family history  Physical Exam:    Vitals:   Vitals:    22 1447   BP: 106/54   Pulse: 74   SpO2: 97%       Physical Exam  Constitutional:       General: He is not in acute distress  Appearance: Normal appearance  HENT:      Head: Normocephalic and atraumatic  Mouth/Throat:      Mouth: Mucous membranes are moist    Eyes:      General: No scleral icterus  Extraocular Movements: Extraocular movements intact  Cardiovascular:      Rate and Rhythm: Normal rate and regular rhythm  Heart sounds: S1 normal and S2 normal  No murmur heard  No friction rub  No gallop  Comments: Nonelevated JVP  Trace pitting edema bilaterally  Pulmonary:      Breath sounds: Normal breath sounds  Abdominal:      General: There is no distension  Palpations: Abdomen is soft  Tenderness: There is no abdominal tenderness  There is no guarding or rebound  Musculoskeletal:         General: Normal range of motion  Cervical back: Neck supple  Skin:     General: Skin is warm and dry  Capillary Refill: Capillary refill takes less than 2 seconds  Neurological:      General: No focal deficit present  Mental Status: He is alert and oriented to person, place, and time  Psychiatric:         Mood and Affect: Mood normal          Labs & Results:    Lab Results   Component Value Date    SODIUM 141 01/25/2022    K 3 8 01/25/2022     01/25/2022    CO2 25 01/25/2022    BUN 14 01/25/2022    CREATININE 0 92 01/25/2022    GLUC 118 01/25/2022    CALCIUM 9 0 01/25/2022     Lab Results   Component Value Date    WBC 5 03 01/25/2022    HGB 11 0 (L) 01/25/2022    HCT 33 6 (L) 01/25/2022    MCV 86 01/25/2022     01/25/2022     Lab Results   Component Value Date    NTBNP 5,157 (H) 06/22/2021      Lab Results   Component Value Date    CHOLESTEROL 146 09/30/2021     Lab Results   Component Value Date    HDL 35 (L) 09/30/2021     Lab Results   Component Value Date    TRIG 249 (H) 09/30/2021     No results found for: Jaime    EKG personally reviewed by Lj Ng MD      Counseling / Coordination of Care  Time spent today 25 minutes  Greater than 50% of total time was spent with the patient and / or family counseling and / or coordination of care  We went over current diagnosis, most recent studies and any changes in treatment  Thank you for the opportunity to participate in the care of this patient      Kartik Vásquez MD  ADVANCED HEART FAILURE AND MECHANICAL CIRCULATORY [id number]

## 2022-01-27 ENCOUNTER — HOSPITAL ENCOUNTER (OUTPATIENT)
Dept: RADIOLOGY | Age: 77
Discharge: HOME | End: 2022-01-27
Payer: COMMERCIAL

## 2022-01-27 ENCOUNTER — OFFICE VISIT (OUTPATIENT)
Dept: HEMATOLOGY ONCOLOGY | Facility: CLINIC | Age: 77
End: 2022-01-27
Payer: COMMERCIAL

## 2022-01-27 VITALS
SYSTOLIC BLOOD PRESSURE: 138 MMHG | RESPIRATION RATE: 14 BRPM | BODY MASS INDEX: 31.71 KG/M2 | HEIGHT: 67 IN | DIASTOLIC BLOOD PRESSURE: 78 MMHG | OXYGEN SATURATION: 99 % | WEIGHT: 202 LBS | HEART RATE: 78 BPM | TEMPERATURE: 98.6 F

## 2022-01-27 DIAGNOSIS — C61 MALIGNANT NEOPLASM OF PROSTATE (HCC): ICD-10-CM

## 2022-01-27 DIAGNOSIS — C61 PROSTATE CANCER (HCC): Primary | ICD-10-CM

## 2022-01-27 PROCEDURE — A9606 RADIUM RA223 DICHLORIDE THER: HCPCS

## 2022-01-27 PROCEDURE — 79101 NUCLEAR RX IV ADMIN: CPT

## 2022-01-27 PROCEDURE — 1160F RVW MEDS BY RX/DR IN RCRD: CPT | Performed by: INTERNAL MEDICINE

## 2022-01-27 PROCEDURE — 1036F TOBACCO NON-USER: CPT | Performed by: INTERNAL MEDICINE

## 2022-01-27 PROCEDURE — 99214 OFFICE O/P EST MOD 30 MIN: CPT | Performed by: INTERNAL MEDICINE

## 2022-01-27 NOTE — PROGRESS NOTES
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 until approximately March of this year   Per the records PSA was slowly rising while on treatment    HPI:    Hermilo Benitez seen for initial consultation 10/5/2021 regarding  A history of metastatic castration resistant prostate cancer   The patient was diagnosed with a Francisco Javier 7 prostate cancer treated with external beam radiation from October of 2003 to December of 2003  Roberto Carlos Aguilar also got neoadjuvant and adjuvant [facility] for 9 months after radiation  Roberto Carlos Aguilar was observed until July of 2008 when he was restarted on Zoladex in later Lupron   PSA was going up and imaging showed new bony lesions at that time  Roberto Carlos Aguilar was started on are better own in March of 2015 and his scan in June of 2017 showed progression so was started on enzalutamide   His PSA continued to rise and he had progressive bony metastases including to the sacrum for which he received 3000 cGy of radiation in 10 fractions in February of 2019  He was then restarted on enzalutamide but a scan in December of 2019 showed worsening bone metastases so he was started on docetaxel in March of 2020  He was kept on this for approximately a year with his PSA slowly rising near the end per the notes   After March he states he was admitted to a hospital for cardiac issues and then never went back for either chemotherapy or his Lupron       Previous Hematologic/ Oncologic History:      As above    Current Hematologic/ Oncologic Treatment:      The patient gets Lupron every 3 months  He has been started on Xofigo in November Interval History:    The patient returns for follow-up visit  He is doing reasonably well  He is on [place]  Also gets Lupron  Is tolerating his treatment reasonably well    His last PSA had come down slightly to 21 from above 30  Again he is still in the process of being treated so this is arbitrary  Regardless he is not really a good candidate for chemotherapy at this point so hopefully he will have a nice response to Odd having progressed on other treatments in the past   Denies any nausea denies any vomiting denies any diarrhea  The rest of his 14 point review of systems today was negative  Test Results:    Imaging: NM xofigo infusion    Result Date: 12/30/2021  Narrative: Ashley Mendosa THERAPY Indication:   C61: Malignant neoplasm of prostate   Prostate cancer, bone metastases Radiopharmaceutical:  143 uCi radium 36 Xofigo Comparison:  11/4/2021 FINDINGS: Patient is a 68year old male with a history of prostate cancer with bone metastases  Laboratory blood work was drawn on 12/30/2021 Hemoglobin 10 6  Platelet count was 150  Absolute neutrophils 3 66  The benefits, risks and alternatives of this therapy were explained  Written informed consent was obtained  The patient was also given written and oral instructions regarding posttherapy care  [address] was administered intravenously without incident  Impression: 143 uCi radium 36 Xofigo was administered intravenously without immediate complication  Workstation performed: [id number]     Cardiac EP device report    Result Date: 1/11/2022  Narrative: MARIEL BI-V ICD -ACTIVE SYSTEM IS MRI CONDITIONAL DEVICE INTERROGATED IN THE [place] OFFICE: WOUND CHECK: INCISION CLEAN AND DRY WITH EDGES APPROXIMATED; AQUALCEL BANDAGE REMOVED; WOUND CARE AND RESTRICTIONS REVIEWED WITH PATIENT  BATTERY VOLTAGE ADEQUATE (5 5 YRS)  AP 17% BVP 96% (DDDR 60)  ALL LEAD PARAMETERS WITHIN NORMAL LIMITS  NO SIGNIFICANT HIGH RATE EPISODES  EF 25% 911/2021 ECHO)  PATIENT ON ASA 81, CLOPIDOGREL, METOPROLOL SUCC  CORVUE IMPEDANCE THRESHOLD PROGRAMMED "ON"  NO OTHER PROGRAMMING CHANGES MADE TO DEVICE PARAMETERS  NORMAL DEVICE FUNCTION      ES       Labs:   Lab Results Component Value Date    WBC 5 03 01/25/2022    HGB 11 0 (L) 01/25/2022    HCT 33 6 (L) 01/25/2022    MCV 86 01/25/2022     01/25/2022     Lab Results   Component Value Date    K 3 8 01/25/2022     01/25/2022    CO2 25 01/25/2022    BUN 14 01/25/2022    CREATININE 0 92 01/25/2022    GLUF 96 12/23/2021    CALCIUM 9 0 01/25/2022    CORRECTEDCA 9 2 06/23/2021    AST 15 01/25/2022    ALT 26 01/25/2022    ALKPHOS 73 01/25/2022    EGFR 80 01/25/2022       Lab Results   Component Value Date    PSA 21 0 (H) 01/25/2022    PSA 32 3 (H) 11/01/2021    PSA 21 0 (H) 10/05/2021     ROS: As stated in the history of present illness otherwise his 14 point review of systems today was negative  Active Problems:   Patient Active Problem List   Diagnosis    Chronic combined systolic and diastolic CHF (congestive heart failure) (HCC)    S/P AVR    Anemia    Chronic midline low back pain    Ischemic cardiomyopathy    Ischemic leg pain    Prostate cancer ([facility] )    CAD (coronary artery disease)    Fever    Hypotension    Lower extremity pain    Urinary retention    PAD (peripheral artery disease) ([facility] )    Port-A-Cath in place       Past Medical History:   Past Medical History:   Diagnosis Date    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    Coronary artery disease 2001    S/p stenting to circumflex and RCA    HFrEF (heart failure with reduced ejection fraction) ([facility] ) 06/14/2021    High cholesterol     Prostate cancer ([facility] )        Surgical History:   Past Surgical History:   Procedure Laterality Date    CARDIAC ELECTROPHYSIOLOGY PROCEDURE N/A 12/23/2021    Procedure: Implant ICD - bi ventricular;  Surgeon: Christin Luis MD;  [facility];   Service: Cardiology    CARDIAC SURGERY      RI THROMBOENDARTECTMY FEMORAL COMMON Right 7/9/2021    Procedure: ENDARTERECTOMY ARTERIAL FEMORAL;  Surgeon: Miguel Angel Betancur DO;  Location: [facility];  Service: Vascular       Family History:  No family history on file  Cancer-related family history is not on file  Social History:   Social History     Socioeconomic History    Marital status:      Spouse name: Not on file    Number of children: 3    Years of education: Not on file    Highest education level: Not on file   Occupational History    Not on file   Tobacco Use    Smoking status: Former Smoker     Years: 20      Quit date: 2002     Years since quittin 5    Smokeless tobacco: Never Used   Vaping Use    Vaping Use: Never used   Substance and Sexual Activity    Alcohol use: Not Currently    Drug use: Not on file    Sexual activity: Not on file   Other Topics Concern    Not on file   Social History Narrative    Not on file     Social Determinants of Health     Financial Resource Strain: Not on file   Food Insecurity: Not on file   Transportation Needs: Not on file   Physical Activity: Not on file   Stress: Not on file   Social Connections: Not on file   Intimate Partner Violence: Not on file   Housing Stability: Not on file       Current Medications:   Current Outpatient Medications   Medication Sig Dispense Refill    acetaminophen (TYLENOL) 325 mg tablet Take 3 tablets (975 mg total) by mouth every 8 (eight) hours 90 tablet 0    aspirin 81 mg chewable tablet Chew 1 tablet (81 mg total) daily 30 tablet 0    atorvastatin (LIPITOR) 80 mg tablet TAKE 1 TABLET BY MOUTH DAILY WITH DINNER 90 tablet 1    clopidogrel (PLAVIX) 75 mg tablet TAKE 1 TABLET BY MOUTH EVERY DAY 90 tablet 1    docusate sodium (COLACE) 100 mg capsule Take 1 capsule (100 mg total) by mouth 2 (two) times a day 10 capsule 0    ferrous sulfate 325 (65 Fe) mg tablet TAKE 1 TABLET BY MOUTH 2 TIMES A DAY WITH MEALS   60 tablet 1    furosemide (LASIX) 20 mg tablet Take 1 tablet (20 mg total) by mouth daily 30 tablet 3    gabapentin (NEURONTIN) 100 mg capsule Take 3 capsules (300 mg total) by mouth 3 (three) times a day 90 capsule 3    leuprolide (Eligard) 22 5 mg 22  5MG/1 SYRINGE UNDER THE SKIN ONE TIME AS DIRECTED EVERY 3 MONTHS      lidocaine (LIDODERM) 5 % Apply 2 patches topically daily Remove & Discard patch within 12 hours or as directed by MD 15 patch 0    loperamide (IMODIUM) 2 mg capsule Take 1 capsule (2 mg total) by mouth 4 (four) times a day as needed for diarrhea 30 capsule 0    losartan (COZAAR) 25 mg tablet TAKE 1 TABLET BY MOUTH EVERY DAY 90 tablet 1    methocarbamol (ROBAXIN) 500 mg tablet TAKE 1 TABLET BY MOUTH EVERY 6 HOURS 120 tablet 0    metoprolol succinate (TOPROL-XL) 25 mg 24 hr tablet TAKE 1 TABLET BY MOUTH EVERY DAY 90 tablet 2    omeprazole (PriLOSEC) 20 mg delayed release capsule Take 1 capsule (20 mg total) by mouth daily 90 capsule 1    prochlorperazine (COMPAZINE) 10 mg tablet Take 1 tablet (10 mg total) by mouth every 6 (six) hours as needed for nausea or vomiting 45 tablet 3    abiraterone (ZYTIGA) 250 mg tablet Take 4 tablets (1,000 mg total) by mouth daily (Patient not taking: Reported on 9/16/2021) 30 tablet 2    traMADol (ULTRAM) 50 mg tablet Take 50 mg by mouth every 6 (six) hours as needed for moderate pain  (Patient not taking: Reported on 1/27/2022 )       No current facility-administered medications for this visit  Allergies: No Known Allergies    Physical Exam:    Body surface area is 2 03 meters squared      Wt Readings from Last 3 Encounters:   01/27/22 91 6 kg (202 lb)   01/25/22 91 2 kg (201 lb 1 6 oz)   12/06/21 94 8 kg (209 lb)        Temp Readings from Last 3 Encounters:   01/27/22 98 6 °F (37 °C) (Temporal)   12/23/21 97 8 °F (36 6 °C) (Tympanic)   10/27/21 97 8 °F (36 6 °C)        BP Readings from Last 3 Encounters:   01/27/22 138/78   01/25/22 106/54   12/23/21 107/60         Pulse Readings from Last 3 Encounters:   01/27/22 78   01/25/22 74   12/23/21 76         Physical Exam     Constitutional   General appearance: No acute distress,     Eyes   Conjunctiva and lids: No swelling, erythema or discharge  Pupils and irises: Equal, round and reactive to light  Ears, Nose, Mouth, and Throat   External inspection of ears and nose: Normal     Nasal mucosa, septum, and turbinates: Normal without edema or erythema  Oropharynx: Normal with no erythema, edema, exudate or lesions  Pulmonary   Respiratory effort: No increased work of breathing or signs of respiratory distress  Auscultation of lungs: Clear to auscultation  Cardiovascular   Palpation of heart: Normal PMI, no thrills  Auscultation of heart: Normal rate and rhythm, normal S1 and S2, without murmurs  Examination of extremities for edema and/or varicosities: Normal     Carotid pulses: Normal     Abdomen   Abdomen: Non-tender, no masses  Liver and spleen: No hepatomegaly or splenomegaly  Lymphatic   Palpation of lymph nodes in neck: No lymphadenopathy  Musculoskeletal   Gait and station:   Walks with a cane  Digits and nails: Normal without clubbing or cyanosis  Inspection/palpation of joints, bones, and muscles: Normal     Skin   Skin and subcutaneous tissue: Normal without rashes or lesions  Assessment / Plan: This is an unfortunate 79-year-old male with a past medical history of Francisco Javier 7 prostate cancer with what appears to be predominantly bony metastatic disease who had been on docetaxel and Lupron recently both of which he stated were stopped in March 2021 when he got admitted to an outside hospital for cardiac issues   Per the notes I do have it appeared he was progressing somewhat on docetaxel with a rising PSA       He has had radiation in the past to symptomatic bone metastases  Because he had progressed already on arbiterone and enzalutamide along with docetaxel a reasonable option for his mostly bony metastatic disease was Xofigo  His PET-CT scan showed mainly bony metastatic disease  He was seen by our colleagues in nuclear Medicine who agreed with recommendation    He was started him on Xofigo and he is still getting Lupron every 3 months  Goals and Barriers:  Current Goal:  Prolong Survival from prostate cancer  Barriers: None  Patient's Capacity to Self Care:  Patient able to self care  Portions of the record may have been created with voice recognition software  Occasional wrong word or "sound a like" substitutions may have occurred due to the inherent limitations of voice recognition software  Read the chart carefully and recognize, using context, where substitutions have occurred

## 2022-01-31 DIAGNOSIS — I50.22 CHRONIC HFREF (HEART FAILURE WITH REDUCED EJECTION FRACTION) (HCC): ICD-10-CM

## 2022-01-31 RX ORDER — FUROSEMIDE 20 MG/1
TABLET ORAL
Qty: 90 TABLET | Refills: 1 | Status: SHIPPED | OUTPATIENT
Start: 2022-01-31 | End: 2022-08-01

## 2022-02-07 ENCOUNTER — APPOINTMENT (OUTPATIENT)
Dept: LAB | Facility: CLINIC | Age: 77
End: 2022-02-07
Payer: COMMERCIAL

## 2022-02-07 DIAGNOSIS — C61 PROSTATE CANCER (HCC): ICD-10-CM

## 2022-02-07 LAB
ALBUMIN SERPL BCP-MCNC: 3.7 G/DL (ref 3.5–5)
ALP SERPL-CCNC: 73 U/L (ref 46–116)
ALT SERPL W P-5'-P-CCNC: 20 U/L (ref 12–78)
ANION GAP SERPL CALCULATED.3IONS-SCNC: 7 MMOL/L (ref 4–13)
AST SERPL W P-5'-P-CCNC: 11 U/L (ref 5–45)
BASOPHILS # BLD AUTO: 0.03 THOUSANDS/ΜL (ref 0–0.1)
BASOPHILS NFR BLD AUTO: 1 % (ref 0–1)
BILIRUB SERPL-MCNC: 0.8 MG/DL (ref 0.2–1)
BUN SERPL-MCNC: 16 MG/DL (ref 5–25)
CALCIUM SERPL-MCNC: 9.3 MG/DL (ref 8.3–10.1)
CHLORIDE SERPL-SCNC: 106 MMOL/L (ref 100–108)
CO2 SERPL-SCNC: 25 MMOL/L (ref 21–32)
CREAT SERPL-MCNC: 0.87 MG/DL (ref 0.6–1.3)
EOSINOPHIL # BLD AUTO: 0.07 THOUSAND/ΜL (ref 0–0.61)
EOSINOPHIL NFR BLD AUTO: 2 % (ref 0–6)
ERYTHROCYTE [DISTWIDTH] IN BLOOD BY AUTOMATED COUNT: 15 % (ref 11.6–15.1)
GFR SERPL CREATININE-BSD FRML MDRD: 83 ML/MIN/1.73SQ M
GLUCOSE SERPL-MCNC: 93 MG/DL (ref 65–140)
HCT VFR BLD AUTO: 34.2 % (ref 36.5–49.3)
HGB BLD-MCNC: 10.7 G/DL (ref 12–17)
IMM GRANULOCYTES # BLD AUTO: 0.03 THOUSAND/UL (ref 0–0.2)
IMM GRANULOCYTES NFR BLD AUTO: 1 % (ref 0–2)
LYMPHOCYTES # BLD AUTO: 0.94 THOUSANDS/ΜL (ref 0.6–4.47)
LYMPHOCYTES NFR BLD AUTO: 22 % (ref 14–44)
MCH RBC QN AUTO: 27.6 PG (ref 26.8–34.3)
MCHC RBC AUTO-ENTMCNC: 31.3 G/DL (ref 31.4–37.4)
MCV RBC AUTO: 88 FL (ref 82–98)
MONOCYTES # BLD AUTO: 0.44 THOUSAND/ΜL (ref 0.17–1.22)
MONOCYTES NFR BLD AUTO: 10 % (ref 4–12)
NEUTROPHILS # BLD AUTO: 2.75 THOUSANDS/ΜL (ref 1.85–7.62)
NEUTS SEG NFR BLD AUTO: 64 % (ref 43–75)
NRBC BLD AUTO-RTO: 0 /100 WBCS
PLATELET # BLD AUTO: 199 THOUSANDS/UL (ref 149–390)
PMV BLD AUTO: 10.5 FL (ref 8.9–12.7)
POTASSIUM SERPL-SCNC: 4.5 MMOL/L (ref 3.5–5.3)
PROT SERPL-MCNC: 6.6 G/DL (ref 6.4–8.2)
PSA SERPL-MCNC: 18.3 NG/ML (ref 0–4)
RBC # BLD AUTO: 3.87 MILLION/UL (ref 3.88–5.62)
SODIUM SERPL-SCNC: 138 MMOL/L (ref 136–145)
WBC # BLD AUTO: 4.26 THOUSAND/UL (ref 4.31–10.16)

## 2022-02-07 PROCEDURE — 80053 COMPREHEN METABOLIC PANEL: CPT

## 2022-02-07 PROCEDURE — 85025 COMPLETE CBC W/AUTO DIFF WBC: CPT

## 2022-02-07 PROCEDURE — 84153 ASSAY OF PSA TOTAL: CPT

## 2022-02-07 PROCEDURE — 36415 COLL VENOUS BLD VENIPUNCTURE: CPT

## 2022-02-09 DIAGNOSIS — D64.9 ANEMIA, UNSPECIFIED TYPE: ICD-10-CM

## 2022-02-10 RX ORDER — FERROUS SULFATE 325(65) MG
TABLET ORAL
Qty: 60 TABLET | Refills: 1 | Status: SHIPPED | OUTPATIENT
Start: 2022-02-10 | End: 2022-04-01

## 2022-02-10 NOTE — TELEPHONE ENCOUNTER
We will refill medication  He is due for his annual wellness visit  We can check and iron panel then   Thank you

## 2022-02-10 NOTE — TELEPHONE ENCOUNTER
Royce Rene has requested a refill of ferrous sulfate 325 (65 Fe) mg tablet  Pt does not meet the requirements placed by [facility]  Would a refill be appropriate? Number Of Treatments: 3

## 2022-02-14 ENCOUNTER — APPOINTMENT (EMERGENCY)
Dept: CT IMAGING | Facility: HOSPITAL | Age: 77
End: 2022-02-14
Payer: COMMERCIAL

## 2022-02-14 ENCOUNTER — HOSPITAL ENCOUNTER (EMERGENCY)
Facility: HOSPITAL | Age: 77
Discharge: HOME | End: 2022-02-14
Attending: EMERGENCY MEDICINE | Admitting: EMERGENCY MEDICINE
Payer: COMMERCIAL

## 2022-02-14 VITALS
WEIGHT: 203.26 LBS | TEMPERATURE: 97.5 F | OXYGEN SATURATION: 98 % | BODY MASS INDEX: 30.81 KG/M2 | HEART RATE: 74 BPM | DIASTOLIC BLOOD PRESSURE: 68 MMHG | HEIGHT: 68 IN | SYSTOLIC BLOOD PRESSURE: 132 MMHG | RESPIRATION RATE: 16 BRPM

## 2022-02-14 DIAGNOSIS — G89.3 CANCER RELATED PAIN: Primary | ICD-10-CM

## 2022-02-14 LAB
2HR DELTA HS TROPONIN: 2 NG/L
4HR DELTA HS TROPONIN: -1 NG/L
ALBUMIN SERPL BCP-MCNC: 3.7 G/DL (ref 3.5–5)
ALP SERPL-CCNC: 68 U/L (ref 46–116)
ALT SERPL W P-5'-P-CCNC: 23 U/L (ref 12–78)
ANION GAP SERPL CALCULATED.3IONS-SCNC: 11 MMOL/L (ref 4–13)
AST SERPL W P-5'-P-CCNC: 11 U/L (ref 5–45)
BASOPHILS # BLD AUTO: 0.02 THOUSANDS/ΜL (ref 0–0.1)
BASOPHILS NFR BLD AUTO: 1 % (ref 0–1)
BILIRUB SERPL-MCNC: 0.62 MG/DL (ref 0.2–1)
BILIRUB UR QL STRIP: NEGATIVE
BUN SERPL-MCNC: 16 MG/DL (ref 5–25)
CALCIUM SERPL-MCNC: 9 MG/DL (ref 8.3–10.1)
CARDIAC TROPONIN I PNL SERPL HS: 26 NG/L
CARDIAC TROPONIN I PNL SERPL HS: 27 NG/L
CARDIAC TROPONIN I PNL SERPL HS: 29 NG/L
CHLORIDE SERPL-SCNC: 106 MMOL/L (ref 100–108)
CLARITY UR: CLEAR
CO2 SERPL-SCNC: 22 MMOL/L (ref 21–32)
COLOR UR: YELLOW
CREAT SERPL-MCNC: 0.82 MG/DL (ref 0.6–1.3)
EOSINOPHIL # BLD AUTO: 0.09 THOUSAND/ΜL (ref 0–0.61)
EOSINOPHIL NFR BLD AUTO: 2 % (ref 0–6)
ERYTHROCYTE [DISTWIDTH] IN BLOOD BY AUTOMATED COUNT: 14.9 % (ref 11.6–15.1)
GFR SERPL CREATININE-BSD FRML MDRD: 85 ML/MIN/1.73SQ M
GLUCOSE SERPL-MCNC: 96 MG/DL (ref 65–140)
GLUCOSE UR STRIP-MCNC: NEGATIVE MG/DL
HCT VFR BLD AUTO: 32.6 % (ref 36.5–49.3)
HGB BLD-MCNC: 10.7 G/DL (ref 12–17)
HGB UR QL STRIP.AUTO: NEGATIVE
IMM GRANULOCYTES # BLD AUTO: 0.02 THOUSAND/UL (ref 0–0.2)
IMM GRANULOCYTES NFR BLD AUTO: 1 % (ref 0–2)
KETONES UR STRIP-MCNC: NEGATIVE MG/DL
LEUKOCYTE ESTERASE UR QL STRIP: NEGATIVE
LYMPHOCYTES # BLD AUTO: 0.76 THOUSANDS/ΜL (ref 0.6–4.47)
LYMPHOCYTES NFR BLD AUTO: 19 % (ref 14–44)
MCH RBC QN AUTO: 29.1 PG (ref 26.8–34.3)
MCHC RBC AUTO-ENTMCNC: 32.8 G/DL (ref 31.4–37.4)
MCV RBC AUTO: 89 FL (ref 82–98)
MONOCYTES # BLD AUTO: 0.4 THOUSAND/ΜL (ref 0.17–1.22)
MONOCYTES NFR BLD AUTO: 10 % (ref 4–12)
NEUTROPHILS # BLD AUTO: 2.81 THOUSANDS/ΜL (ref 1.85–7.62)
NEUTS SEG NFR BLD AUTO: 67 % (ref 43–75)
NITRITE UR QL STRIP: NEGATIVE
NRBC BLD AUTO-RTO: 0 /100 WBCS
NT-PROBNP SERPL-MCNC: 4279 PG/ML
PH UR STRIP.AUTO: 5.5 [PH] (ref 4.5–8)
PLATELET # BLD AUTO: 164 THOUSANDS/UL (ref 149–390)
PMV BLD AUTO: 9.6 FL (ref 8.9–12.7)
POTASSIUM SERPL-SCNC: 4.2 MMOL/L (ref 3.5–5.3)
PROT SERPL-MCNC: 6.6 G/DL (ref 6.4–8.2)
PROT UR STRIP-MCNC: NEGATIVE MG/DL
RBC # BLD AUTO: 3.68 MILLION/UL (ref 3.88–5.62)
SODIUM SERPL-SCNC: 139 MMOL/L (ref 136–145)
SP GR UR STRIP.AUTO: 1.02 (ref 1–1.03)
TSH SERPL DL<=0.05 MIU/L-ACNC: 0.8 UIU/ML (ref 0.36–3.74)
UROBILINOGEN UR QL STRIP.AUTO: 0.2 E.U./DL
WBC # BLD AUTO: 4.1 THOUSAND/UL (ref 4.31–10.16)

## 2022-02-14 PROCEDURE — 83880 ASSAY OF NATRIURETIC PEPTIDE: CPT | Performed by: EMERGENCY MEDICINE

## 2022-02-14 PROCEDURE — 81003 URINALYSIS AUTO W/O SCOPE: CPT

## 2022-02-14 PROCEDURE — 85025 COMPLETE CBC W/AUTO DIFF WBC: CPT | Performed by: EMERGENCY MEDICINE

## 2022-02-14 PROCEDURE — 99284 EMERGENCY DEPT VISIT MOD MDM: CPT | Performed by: EMERGENCY MEDICINE

## 2022-02-14 PROCEDURE — 93005 ELECTROCARDIOGRAM TRACING: CPT

## 2022-02-14 PROCEDURE — G1004 CDSM NDSC: HCPCS

## 2022-02-14 PROCEDURE — 36415 COLL VENOUS BLD VENIPUNCTURE: CPT | Performed by: EMERGENCY MEDICINE

## 2022-02-14 PROCEDURE — 99285 EMERGENCY DEPT VISIT HI MDM: CPT

## 2022-02-14 PROCEDURE — 84443 ASSAY THYROID STIM HORMONE: CPT | Performed by: EMERGENCY MEDICINE

## 2022-02-14 PROCEDURE — 84484 ASSAY OF TROPONIN QUANT: CPT | Performed by: EMERGENCY MEDICINE

## 2022-02-14 PROCEDURE — 80053 COMPREHEN METABOLIC PANEL: CPT | Performed by: EMERGENCY MEDICINE

## 2022-02-14 PROCEDURE — 72131 CT LUMBAR SPINE W/O DYE: CPT

## 2022-02-14 RX ORDER — OXYCODONE HYDROCHLORIDE 5 MG/1
5 TABLET ORAL EVERY 8 HOURS PRN
Qty: 15 TABLET | Refills: 0 | Status: SHIPPED | OUTPATIENT
Start: 2022-02-14 | End: 2022-02-21

## 2022-02-14 RX ORDER — LIDOCAINE 50 MG/G
1 PATCH TOPICAL DAILY
Qty: 14 PATCH | Refills: 0 | Status: SHIPPED | OUTPATIENT
Start: 2022-02-14 | End: 2022-03-29

## 2022-02-14 RX ORDER — LIDOCAINE 50 MG/G
1 PATCH TOPICAL ONCE
Status: DISCONTINUED | OUTPATIENT
Start: 2022-02-14 | End: 2022-02-14 | Stop reason: HOSPADM

## 2022-02-14 RX ORDER — OXYCODONE HYDROCHLORIDE 5 MG/1
5 TABLET ORAL ONCE
Status: COMPLETED | OUTPATIENT
Start: 2022-02-14 | End: 2022-02-14

## 2022-02-14 RX ADMIN — OXYCODONE HYDROCHLORIDE 5 MG: 5 TABLET ORAL at 17:09

## 2022-02-14 RX ADMIN — LIDOCAINE 5% 1 PATCH: 700 PATCH TOPICAL at 17:09

## 2022-02-14 NOTE — DISCHARGE INSTRUCTIONS
Many insurance companies do not cover lidocaine patches  If your insurance company does not, there is a similar over-the-counter option available  Please ask your pharmacist for details

## 2022-02-15 NOTE — ED PROVIDER NOTES
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      Patient presents for evaluation of restlessness and increased discomfort in his back for the last 2 weeks  Patient states that despite taking the Tylenol prescribed to him he feels like the pain in his lower back and sacrum are poorly controlled  He reports that this makes it hard for him to find a position of comfort and leaves him generally a restless  Denies saddle anesthesia, leg weakness, or incontinence of his bowel or bladder        He also reports some generalized weakness over the last 3 weeks, that he thinks got worse after his last infusion of docetaxel  He denies headache, chest pain, trouble breathing, cough, fevers, chills, or abdominal pain  He reports occasional nausea, no vomiting  He has chronic diarrhea for which he takes an anti diarrheal medication with good success  Denies dysuria but reports urinary frequency  The patient does admit to skipping his home Lasix over the last 3 days because it makes me pee a lot and I was afraid I was getting dehydrated "     The patient is able to ambulate with his walker, though reports difficulty with doing so over the last year and a half due to neuropathy in his right foot  He also uses a wheelchair at home  Denies dizziness or lightheadedness  Prior to Admission Medications   Prescriptions Last Dose Informant Patient Reported?  Taking?   abiraterone (ZYTIGA) 250 mg tablet  Self No No   Sig: Take 4 tablets (1,000 mg total) by mouth daily   Patient not taking: Reported on 9/16/2021   acetaminophen (TYLENOL) 325 mg tablet  Self No No   Sig: Take 3 tablets (975 mg total) by mouth every 8 (eight) hours   aspirin 81 mg chewable tablet  Self No No   Sig: Chew 1 tablet (81 mg total) daily   atorvastatin (LIPITOR) 80 mg tablet  Self No No   Sig: TAKE 1 TABLET BY MOUTH DAILY WITH DINNER   clopidogrel (PLAVIX) 75 mg tablet  Self No No   Sig: TAKE 1 TABLET BY MOUTH EVERY DAY   docusate sodium (COLACE) 100 mg capsule  Self No No   Sig: Take 1 capsule (100 mg total) by mouth 2 (two) times a day   ferrous sulfate 325 (65 Fe) mg tablet   No No   Sig: TAKE 1 TABLET BY MOUTH TWICE A DAY WITH MEALS   furosemide (LASIX) 20 mg tablet   No No   Sig: TAKE 1 TABLET BY MOUTH EVERY DAY   gabapentin (NEURONTIN) 100 mg capsule  Self No No   Sig: Take 3 capsules (300 mg total) by mouth 3 (three) times a day   leuprolide (Eligard) 22 5 mg  Self Yes No   Si  5MG/1 SYRINGE UNDER THE SKIN ONE TIME AS DIRECTED EVERY 3 MONTHS   lidocaine (LIDODERM) 5 %  Self No No   Sig: Apply 2 patches topically daily Remove & Discard patch within 12 hours or as directed by MD   loperamide (IMODIUM) 2 mg capsule  Self No No   Sig: Take 1 capsule (2 mg total) by mouth 4 (four) times a day as needed for diarrhea   losartan (COZAAR) 25 mg tablet  Self No No   Sig: TAKE 1 TABLET BY MOUTH EVERY DAY   methocarbamol (ROBAXIN) 500 mg tablet  Self No No   Sig: TAKE 1 TABLET BY MOUTH EVERY 6 HOURS   metoprolol succinate (TOPROL-XL) 25 mg 24 hr tablet  Self No No   Sig: TAKE 1 TABLET BY MOUTH EVERY DAY   omeprazole (PriLOSEC) 20 mg delayed release capsule  Self No No   Sig: Take 1 capsule (20 mg total) by mouth daily   prochlorperazine (COMPAZINE) 10 mg tablet  Self No No   Sig: Take 1 tablet (10 mg total) by mouth every 6 (six) hours as needed for nausea or vomiting   traMADol (ULTRAM) 50 mg tablet  Self Yes No   Sig: Take 50 mg by mouth every 6 (six) hours as needed for moderate pain    Patient not taking: Reported on 2022       Facility-Administered Medications: None       Past Medical History:   Diagnosis Date    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    Coronary artery disease     S/p stenting to circumflex and RCA    [facility] with reduced [facility] fraction[facility] 2021    High cholesterol     Prostate cancer [facility])        Past Surgical History:   Procedure Laterality Date    CARDIAC ELECTROPHYSIOLOGY PROCEDURE N/A 2021    Procedure: Implant ICD - bi ventricular;  Surgeon: Sascha Bhakta MD;  Location: [facility] CARD[facility]AC [facility]ATH [facility]; Service: Cardiology    CARDIAC SURGERY      AR THROMBOENDARTECTMY FEMORAL COMMON Right 2021    Procedure: ENDARTERECTOMY ARTERIAL FEMORAL;  Surgeon: Sara Stiles DO;  Location: [facility];  Service: Vascular       History reviewed  No pertinent family history  I have reviewed and agree with the history as documented  E-Cigarette/Vaping    E-Cigarette Use Never User      E-Cigarette/Vaping Substances     Social History     Tobacco Use    Smoking status: Former Smoker     Years: 20 00     Quit date: 2002     Years since quittin 6    Smokeless tobacco: Never Used   Vaping Use    Vaping Use: Never used   Substance Use Topics    Alcohol use: Not Currently    Drug use: Not on file       Review of Systems   Constitutional: Negative for chills and fever  HENT: Negative for congestion  Eyes: Negative for visual disturbance  Respiratory: Negative for cough and shortness of breath  Cardiovascular: Negative for chest pain and leg swelling  Gastrointestinal: Positive for nausea  Negative for abdominal pain, diarrhea and vomiting  Genitourinary: Positive for frequency  Negative for dysuria  Musculoskeletal: Positive for back pain  Negative for arthralgias, neck pain and neck stiffness  Skin: Negative for rash  Neurological: Positive for weakness (generalized) and numbness (R foot,  chronic and at baseline)  Negative for headaches     Psychiatric/Behavioral: Negative for agitation, behavioral problems and confusion  Physical Exam  Physical Exam  Constitutional:       General: He is not in acute distress  Appearance: He is well-developed  He is not diaphoretic  HENT:      Head: Normocephalic and atraumatic  Right Ear: External ear normal       Left Ear: External ear normal       Nose: Nose normal    Eyes:      Conjunctiva/sclera: Conjunctivae normal    Cardiovascular:      Rate and Rhythm: Normal rate and regular rhythm  Pulses: Normal pulses  Heart sounds: Normal heart sounds  No murmur heard  No friction rub  No gallop  Pulmonary:      Effort: Pulmonary effort is normal  No respiratory distress  Breath sounds: Normal breath sounds  No wheezing or rales  Abdominal:      General: Bowel sounds are normal  There is no distension  Palpations: Abdomen is soft  Tenderness: There is no abdominal tenderness  There is no guarding  Musculoskeletal:         General: No deformity  Normal range of motion  Cervical back: Normal range of motion and neck supple  Right lower leg: No edema  Left lower leg: No edema  Comments: Tenderness to palpation over the lower lumbar spine/sacrum   Skin:     General: Skin is warm and dry  Neurological:      Mental Status: He is alert and oriented to person, place, and time  Motor: No abnormal muscle tone  Comments: 5/5 strength over the proximal and distal bilateral lower extremities with decreased sensation to light touch to the right lower extremity from the mid shin distally  No saddle anesthesia    5/5 strength in the proximal and distal bilateral upper extremities with intact sensation to light touch    Psychiatric:         Mood and Affect: Mood normal          Vital Signs  ED Triage Vitals   Temperature Pulse Respirations Blood Pressure SpO2   02/14/22 1322 02/14/22 1322 02/14/22 1322 02/14/22 1322 02/14/22 1322   97 5 °F (36 4 °C) 75 18 140/64 98 %      Temp Source Heart Rate Source Patient Position - Orthostatic VS BP Location FiO2 (%)   02/14/22 1322 02/14/22 1322 02/14/22 1703 02/14/22 1322 --   Oral Monitor Lying Right arm       Pain Score       --                  Vitals:    02/14/22 1322 02/14/22 1703 02/14/22 1849   BP: 140/64 130/67 132/68   Pulse: 75 73 74   Patient Position - Orthostatic VS:  Lying Sitting         Visual Acuity      ED Medications  Medications   oxyCODONE (ROXICODONE) IR tablet 5 mg (5 mg Oral Given 2/14/22 1709)       Diagnostic Studies  Results Reviewed     Procedure Component Value Units Date/Time    Urine Macroscopic, POC [[id number]] Collected: 02/14/22 1842    Lab Status: Final result Specimen: Urine Updated: 02/14/22 1843     Color, UA Yellow     Clarity, UA Clear     pH, UA 5 5     Leukocytes, UA Negative     Nitrite, UA Negative     Protein, UA Negative mg/dl      Glucose, UA Negative mg/dl      Ketones, UA Negative mg/dl      Urobilinogen, UA 0 2 E U /dl      Bilirubin, UA Negative     Blood, UA Negative     Specific Gravity, UA 1 020    Narrative:      CLINITEK RESULT    HS Troponin I 4hr [[id number]]  (Normal) Collected: 02/14/22 1712    Lab Status: Final result Specimen: Blood from Arm, Right Updated: 02/14/22 1806     hs TnI 4hr 26 ng/L      Delta 4hr hsTnI -1 ng/L     HS Troponin I 2hr [[id number]]  (Normal) Collected: 02/14/22 1607    Lab Status: Final result Specimen: Blood from Arm, Right Updated: 02/14/22 1719     hs TnI 2hr 29 ng/L      Delta 2hr hsTnI 2 ng/L     NT-BNP PRO [[id number]]  (Abnormal) Collected: 02/14/22 1327    Lab Status: Final result Specimen: Blood from Arm, Right Updated: 02/14/22 1659     NT-proBNP 4,279 pg/mL     TSH [[id number]]  (Normal) Collected: 02/14/22 1327    Lab Status: Final result Specimen: Blood from Arm, Right Updated: 02/14/22 1659     TSH 3RD GENERATON 0 802 uIU/mL     Narrative:      Patients undergoing fluorescein dye angiography may retain small amounts of fluorescein in the body for 48-72 hours post procedure  Samples containing fluorescein can produce falsely depressed TSH values  If the patient had this procedure,a specimen should be resubmitted post fluorescein clearance        HS Troponin 0hr (reflex protocol) [[id number]]  (Normal) Collected: 02/14/22 1327    Lab Status: Final result Specimen: Blood from Arm, Right Updated: 02/14/22 1407     hs TnI 0hr 27 ng/L     Comprehensive metabolic panel [[id number]] Collected: 02/14/22 1327    Lab Status: Final result Specimen: Blood from Arm, Right Updated: 02/14/22 1406     Sodium 139 mmol/L      Potassium 4 2 mmol/L      Chloride 106 mmol/L      CO2 22 mmol/L      ANION GAP 11 mmol/L      BUN 16 mg/dL      Creatinine 0 82 mg/dL      Glucose 96 mg/dL      Calcium 9 0 mg/dL      AST 11 U/L      ALT 23 U/L      Alkaline Phosphatase 68 U/L      Total Protein 6 6 g/dL      Albumin 3 7 g/dL      Total Bilirubin 0 62 mg/dL      eGFR 85 ml/min/1 73sq m     Narrative:      National Kidney Disease Foundation guidelines for Chronic Kidney Disease (CKD):     Stage 1 with normal or high GFR (GFR > 90 mL/min/1 73 square meters)    Stage 2 Mild CKD (GFR = 60-89 mL/min/1 73 square meters)    Stage 3A Moderate CKD (GFR = 45-59 mL/min/1 73 square meters)    Stage 3B Moderate CKD (GFR = 30-44 mL/min/1 73 square meters)    Stage 4 Severe CKD (GFR = 15-29 mL/min/1 73 square meters)    Stage 5 End Stage CKD (GFR <15 mL/min/1 73 square meters)  Note: GFR calculation is accurate only with a steady state creatinine    CBC and differential [[id number]]  (Abnormal) Collected: 02/14/22 1327    Lab Status: Final result Specimen: Blood from Arm, Right Updated: 02/14/22 1335     WBC 4 10 Thousand/uL      RBC 3 68 Million/uL      Hemoglobin 10 7 g/dL      Hematocrit 32 6 %      MCV 89 fL      MCH 29 1 pg      MCHC 32 8 g/dL      RDW 14 9 %      MPV 9 6 fL      Platelets 402 Thousands/uL      nRBC 0 /100 WBCs      Neutrophils Relative 67 %      Immat GRANS % 1 %      Lymphocytes Relative 19 % Monocytes Relative 10 %      Eosinophils Relative 2 %      Basophils Relative 1 %      Neutrophils Absolute 2 81 Thousands/µL      Immature Grans Absolute 0 02 Thousand/uL      Lymphocytes Absolute 0 76 Thousands/µL      Monocytes Absolute 0 40 Thousand/µL      Eosinophils Absolute 0 09 Thousand/µL      Basophils Absolute 0 02 Thousands/µL                  CT spine lumbar without contrast   Final Result by Dominic Ibrahim MD (02/14 1741)      Known T12, sacral and iliac osseous blastic metastases unchanged from the PET/CT performed 10/20/2021  No acute fracture  No advanced central significant canal stenosis or advanced neural foraminal narrowing  Workstation performed: [id number]                    Procedures  Procedures         ED Course  ED Course as of 02/15/22 0329   Mon Feb 14, 2022   1635 Non-toxic  Afebrile and HDS  Will order COVID rapid antigen testing given mild leukopenia and UA given report of urinary frequency, though this is likely related to the patient's Lasix  He denies pulmonary symptoms  1636 Electrolytes and renal function within normal limits  Initial troponin 27  EKG and delta troponin ordered as well as BNP  Patient admits to skipping his last 3 doses of Lasix but he does not appear fluid overloaded on exam and lungs are clear to auscultation bilaterally with SpO2 98% on room air  1637 Patient does report worsening pain over his sacrum so will obtain CT scan to evaluate for progression of metastasis  Patient reports feeling restless and having difficulty finding a position of comfort due to his pain for which he is currently only taking Tylenol  Will add lidocaine patches and given dose of oxycodone to assess for improvement  1716 BNP is elevated but improved from June of this year  Lungs clear to auscultation, no oxygen requirement, doubt decompensated heart failure    I personally interpreted the patient's EKG which reveals ventricularly paced rate of 69 beats per minute, normal axis, nonspecific intraventricular conduction block, otherwise normal intervals, T-wave inversion in lead 3 and AVF similar to prior, no ST segment deviation  1752 Osseous mets unchanged from prior CT     1752 Repeat trop 29, delta 2  Patient denies CP, doubt ACS  1810 Patient reports significant improvement in his pain after lidocaine patch and oxycodone  He is eating snacks and stood without assistance to urinate  Will discharge home with prescription for small amount of roxicodone for breatkthrough pain after standard narcotic precautions were discussed  Specifically, I discussed with the patient and his son increased risk of fall with this medication and necessity to be careful with its use due to potential risk for dependence  Ambulatory referral placed for palliative care for further pain management for his cancer related pain  MDM  Number of Diagnoses or Management Options  Cancer related pain: established and worsening  Diagnosis management comments: Please see ED course above for details of the Medical Decision Making  Amount and/or Complexity of Data Reviewed  Clinical lab tests: reviewed and ordered  Tests in the radiology section of CPT®: ordered and reviewed  Obtain history from someone other than the patient: yes  Review and summarize past medical records: yes  Independent visualization of images, tracings, or specimens: yes    Patient Progress  Patient progress: improved      Disposition  Final diagnoses:   Cancer related pain     Time reflects when diagnosis was documented in both MDM as applicable and the Disposition within this note     Time User Action Codes Description Comment    2/14/2022  6:13 PM Nelwyn Galeazzi Add [G89 3] Cancer related pain       ED Disposition     ED Disposition Condition Date/Time Comment    Discharge Stable Mon Feb 14, 2022  6:13 PM Royce BAER Free discharge to home/self care  Follow-up Information     Follow up With Specialties Details Why Contact Info Additional Information    1 Nasrin Vu should receive a phone call to schedule an appointment with a palliative care physician for further management of your cancer-related pain  [address] [phone]  [phone] [address] [facility], [address], [place], [address] [facility], [address] Emergency Department Emergency Medicine  As we discussed, return to the Emergency Department immediately for chest pain, trouble breathing, fever, dizziness, weakness in your legs or numbness in your groin, or for incontinence of your bowel or bladder  [address] Emergency Department, [address], [place], [address] [facility], [address]          Discharge Medication List as of 2/14/2022  6:17 PM      START taking these medications    Details   !! lidocaine (Lidoderm) 5 % Apply 1 patch topically daily for 14 days Remove & Discard patch within 12 hours or as directed by MD, Starting Mon 2/14/2022, Until Mon 2/28/2022, Normal      oxyCODONE (Roxicodone) 5 immediate release tablet Take 1 tablet (5 mg total) by mouth every 8 (eight) hours as needed for severe pain for up to 7 days Max Daily Amount: 15 mg, Starting Mon 2/14/2022, Until Mon 2/21/2022 at 2359, Normal       !! - Potential duplicate medications found  Please discuss with provider        CONTINUE these medications which have NOT CHANGED    Details   abiraterone (ZYTIGA) 250 mg tablet Take 4 tablets (1,000 mg total) by mouth daily, Starting Wed 8/18/2021, Normal      acetaminophen (TYLENOL) 325 mg tablet Take 3 tablets (975 mg total) by mouth every 8 (eight) hours, Starting Tue 7/13/2021, Normal      aspirin 81 mg chewable tablet Chew 1 tablet (81 mg total) daily, Starting Wed 7/14/2021, Normal      atorvastatin (LIPITOR) 80 mg tablet TAKE 1 TABLET BY MOUTH DAILY WITH DINNER, Normal      clopidogrel (PLAVIX) 75 mg tablet TAKE 1 TABLET BY MOUTH EVERY DAY, Normal      docusate sodium (COLACE) 100 mg capsule Take 1 capsule (100 mg total) by mouth 2 (two) times a day, Starting Tue 7/13/2021, Normal      ferrous sulfate 325 (65 Fe) mg tablet TAKE 1 TABLET BY MOUTH TWICE A DAY WITH MEALS, Normal      furosemide (LASIX) 20 mg tablet TAKE 1 TABLET BY MOUTH EVERY DAY, Normal      gabapentin (NEURONTIN) 100 mg capsule Take 3 capsules (300 mg total) by mouth 3 (three) times a day, Starting Mon 12/27/2021, Normal      leuprolide (Eligard) 22 5 mg 22  5MG/1 SYRINGE UNDER THE SKIN ONE TIME AS DIRECTED EVERY 3 MONTHS, Historical Med      !! lidocaine (LIDODERM) 5 % Apply 2 patches topically daily Remove & Discard patch within 12 hours or as directed by MD, Starting Wed 8/18/2021, Normal      loperamide (IMODIUM) 2 mg capsule Take 1 capsule (2 mg total) by mouth 4 (four) times a day as needed for diarrhea, Starting Tue 1/4/2022, Normal      losartan (COZAAR) 25 mg tablet TAKE 1 TABLET BY MOUTH EVERY DAY, Normal      methocarbamol (ROBAXIN) 500 mg tablet TAKE 1 TABLET BY MOUTH EVERY 6 HOURS, Normal      metoprolol succinate (TOPROL-XL) 25 mg 24 hr tablet TAKE 1 TABLET BY MOUTH EVERY DAY, Normal      omeprazole (PriLOSEC) 20 mg delayed release capsule Take 1 capsule (20 mg total) by mouth daily, Starting Fri 7/30/2021, Normal      prochlorperazine (COMPAZINE) 10 mg tablet Take 1 tablet (10 mg total) by mouth every 6 (six) hours as needed for nausea or vomiting, Starting Tue 12/21/2021, Normal      traMADol (ULTRAM) 50 mg tablet Take 50 mg by mouth every 6 (six) hours as needed for moderate pain , Historical Med       !! - Potential duplicate medications found  Please discuss with provider                PDMP Review       Value Time User    PDMP Reviewed  Yes 2/14/2022  4:35 PM Gael Sharif MD ED Provider  Electronically Signed by           Avery Landa MD  02/15/22 9278

## 2022-02-17 LAB
ATRIAL RATE: 69 BPM
P AXIS: 97 DEGREES
PR INTERVAL: 126 MS
QRS AXIS: 72 DEGREES
QRSD INTERVAL: 134 MS
QT INTERVAL: 406 MS
QTC INTERVAL: 435 MS
T WAVE AXIS: -33 DEGREES
VENTRICULAR RATE: 69 BPM

## 2022-02-17 PROCEDURE — 93010 ELECTROCARDIOGRAM REPORT: CPT | Performed by: INTERNAL MEDICINE

## 2022-02-21 ENCOUNTER — APPOINTMENT (OUTPATIENT)
Dept: LAB | Facility: CLINIC | Age: 77
End: 2022-02-21
Payer: COMMERCIAL

## 2022-02-21 DIAGNOSIS — C61 PROSTATE CANCER (HCC): ICD-10-CM

## 2022-02-21 LAB
BASOPHILS # BLD AUTO: 0.02 THOUSANDS/ΜL (ref 0–0.1)
BASOPHILS NFR BLD AUTO: 0 % (ref 0–1)
EOSINOPHIL # BLD AUTO: 0.07 THOUSAND/ΜL (ref 0–0.61)
EOSINOPHIL NFR BLD AUTO: 2 % (ref 0–6)
ERYTHROCYTE [DISTWIDTH] IN BLOOD BY AUTOMATED COUNT: 15.1 % (ref 11.6–15.1)
HCT VFR BLD AUTO: 37 % (ref 36.5–49.3)
HGB BLD-MCNC: 11.7 G/DL (ref 12–17)
IMM GRANULOCYTES # BLD AUTO: 0.02 THOUSAND/UL (ref 0–0.2)
IMM GRANULOCYTES NFR BLD AUTO: 0 % (ref 0–2)
LYMPHOCYTES # BLD AUTO: 0.79 THOUSANDS/ΜL (ref 0.6–4.47)
LYMPHOCYTES NFR BLD AUTO: 17 % (ref 14–44)
MCH RBC QN AUTO: 28.5 PG (ref 26.8–34.3)
MCHC RBC AUTO-ENTMCNC: 31.6 G/DL (ref 31.4–37.4)
MCV RBC AUTO: 90 FL (ref 82–98)
MONOCYTES # BLD AUTO: 0.44 THOUSAND/ΜL (ref 0.17–1.22)
MONOCYTES NFR BLD AUTO: 10 % (ref 4–12)
NEUTROPHILS # BLD AUTO: 3.19 THOUSANDS/ΜL (ref 1.85–7.62)
NEUTS SEG NFR BLD AUTO: 71 % (ref 43–75)
NRBC BLD AUTO-RTO: 0 /100 WBCS
PLATELET # BLD AUTO: 208 THOUSANDS/UL (ref 149–390)
PMV BLD AUTO: 10.2 FL (ref 8.9–12.7)
RBC # BLD AUTO: 4.1 MILLION/UL (ref 3.88–5.62)
WBC # BLD AUTO: 4.53 THOUSAND/UL (ref 4.31–10.16)

## 2022-02-21 PROCEDURE — 36415 COLL VENOUS BLD VENIPUNCTURE: CPT

## 2022-02-21 PROCEDURE — 85025 COMPLETE CBC W/AUTO DIFF WBC: CPT

## 2022-02-23 DIAGNOSIS — K59.1 FUNCTIONAL DIARRHEA: ICD-10-CM

## 2022-02-24 ENCOUNTER — TELEPHONE (OUTPATIENT)
Dept: INTERNAL MEDICINE CLINIC | Facility: CLINIC | Age: 77
End: 2022-02-24

## 2022-02-24 ENCOUNTER — HOSPITAL ENCOUNTER (OUTPATIENT)
Dept: RADIOLOGY | Age: 77
Discharge: HOME | End: 2022-02-24
Payer: COMMERCIAL

## 2022-02-24 DIAGNOSIS — C61 MALIGNANT NEOPLASM OF PROSTATE (HCC): ICD-10-CM

## 2022-02-24 PROCEDURE — A9606 RADIUM RA223 DICHLORIDE THER: HCPCS

## 2022-02-24 PROCEDURE — 79101 NUCLEAR RX IV ADMIN: CPT

## 2022-02-24 RX ORDER — LOPERAMIDE HYDROCHLORIDE 2 MG/1
2 CAPSULE ORAL 4 TIMES DAILY PRN
Qty: 30 CAPSULE | Refills: 2 | Status: SHIPPED | OUTPATIENT
Start: 2022-02-24 | End: 2022-04-12 | Stop reason: SDUPTHER

## 2022-02-24 NOTE — TELEPHONE ENCOUNTER
Pt med gabapentin refilled today for one month  This patient had his last visit in July and was told to schedule in 3 months it is now 7 months  Please call patient to come in for follow up appt   Thank you

## 2022-02-28 ENCOUNTER — OFFICE VISIT (OUTPATIENT)
Dept: INTERNAL MEDICINE CLINIC | Facility: CLINIC | Age: 77
End: 2022-02-28
Payer: COMMERCIAL

## 2022-02-28 VITALS
OXYGEN SATURATION: 100 % | BODY MASS INDEX: 28.92 KG/M2 | HEIGHT: 68 IN | TEMPERATURE: 97.3 F | WEIGHT: 190.8 LBS | HEART RATE: 76 BPM | DIASTOLIC BLOOD PRESSURE: 76 MMHG | SYSTOLIC BLOOD PRESSURE: 122 MMHG

## 2022-02-28 DIAGNOSIS — M79.671 RIGHT FOOT PAIN: ICD-10-CM

## 2022-02-28 DIAGNOSIS — R29.898 MUSCULAR DECONDITIONING: ICD-10-CM

## 2022-02-28 DIAGNOSIS — K08.109 EDENTULOUS: ICD-10-CM

## 2022-02-28 DIAGNOSIS — R63.4 WEIGHT LOSS: ICD-10-CM

## 2022-02-28 DIAGNOSIS — I50.42 CHRONIC COMBINED SYSTOLIC AND DIASTOLIC CHF (CONGESTIVE HEART FAILURE) (HCC): Primary | ICD-10-CM

## 2022-02-28 DIAGNOSIS — Z23 NEED FOR INFLUENZA VACCINATION: ICD-10-CM

## 2022-02-28 DIAGNOSIS — T78.40XD ALLERGY, SUBSEQUENT ENCOUNTER: ICD-10-CM

## 2022-02-28 DIAGNOSIS — I73.9 PAD (PERIPHERAL ARTERY DISEASE) (HCC): ICD-10-CM

## 2022-02-28 DIAGNOSIS — C79.51 MALIGNANT NEOPLASM METASTATIC TO BONE (HCC): ICD-10-CM

## 2022-02-28 PROBLEM — I74.3 EMBOLISM AND THROMBOSIS OF ARTERIES OF THE LOWER EXTREMITIES (HCC): Status: ACTIVE | Noted: 2022-02-28

## 2022-02-28 PROBLEM — F33.9 DEPRESSION, RECURRENT (HCC): Status: ACTIVE | Noted: 2022-02-28

## 2022-02-28 PROCEDURE — 90662 IIV NO PRSV INCREASED AG IM: CPT | Performed by: INTERNAL MEDICINE

## 2022-02-28 PROCEDURE — 99214 OFFICE O/P EST MOD 30 MIN: CPT | Performed by: INTERNAL MEDICINE

## 2022-02-28 PROCEDURE — G0008 ADMIN INFLUENZA VIRUS VAC: HCPCS | Performed by: INTERNAL MEDICINE

## 2022-02-28 RX ORDER — CETIRIZINE HYDROCHLORIDE 5 MG/1
5 TABLET ORAL DAILY
Qty: 30 TABLET | Refills: 3 | Status: SHIPPED | OUTPATIENT
Start: 2022-02-28 | End: 2022-05-05

## 2022-02-28 RX ORDER — CETIRIZINE HYDROCHLORIDE 5 MG/1
5 TABLET, CHEWABLE ORAL DAILY
Qty: 30 TABLET | Refills: 3 | Status: CANCELLED | OUTPATIENT
Start: 2022-02-28

## 2022-02-28 NOTE — PROGRESS NOTES
Assessment/Plan:    Chronic combined systolic and diastolic CHF (congestive heart failure) ([facility])  Wt Readings from Last 3 Encounters:   02/28/22 86 5 kg (190 lb 12 8 oz)   02/14/22 92 2 kg (203 lb 4 2 oz)   01/27/22 91 6 kg (202 lb)       Lasix has been decreased to 20 mg po daily  Continue lasix 20 mg po daily  He is instructed to follow a 2 g Na /day and 2L /day fluid restriction  Take extra 20 mg of lasix if weight increases by 3 lbs  He does not appear juan be fluid overloaded at today's visit  PAD (peripheral artery disease) ([facility])  Continue ASA, plavix, statin  Weight loss  Has lost 13 LBS since 1/25 after his lasix was decreased to 20 mg daily  He feels that this weight loss is mostly due to him not eating much  He reports appetite is decreased but also he is edentulous and can not eat many foods  He does have meals on wheels but says he can't eat much of it due to hard texture or sodium  Will place referral to dietician for guidance and calorie count/needs  Will order Ensure heart healthy with low Na and check insurance coverage  Attention to be paid to maintaining 2 L/day fluid restriction  Cancer treatments may play a role in low appetite as well  Muscular deconditioning  Likely multifactorial, due to inactivity and decreased protein intake  PT ordered  Will try to assess calorie needs with dietician assessment        Diagnoses and all orders for this visit:    Chronic combined systolic and diastolic CHF (congestive heart failure) ([facility] )  -     Nutritional Supplements (Ensure Active Heart Health) LIQD; Take 1 Bottle by mouth 2 (two) times a day    Malignant neoplasm metastatic to bone ([facility])  -     Nutritional Supplements (Ensure Active Heart Health) LIQD; Take 1 Bottle by mouth 2 (two) times a day    PAD (peripheral artery disease) ([facility])  -     Nutritional Supplements (Ensure Active Heart Health) LIQD; Take 1 Bottle by mouth 2 (two) times a day    Weight loss  -     Ambulatory Referral to Nutrition Services; Future  -     Nutritional Supplements (Ensure Active Heart Health) LIQD; Take 1 Bottle by mouth 2 (two) times a day    Need for influenza vaccination  -     influenza vaccine, high-dose, PF 0 7 mL (FLUZONE HIGH-DOSE)    Muscular deconditioning  -     Ambulatory Referral to Physical Therapy; Future  -     Ambulatory Referral to Nutrition Services; Future  -     Nutritional Supplements (Ensure Active Heart Health) LIQD; Take 1 Bottle by mouth 2 (two) times a day    Allergy, subsequent encounter  -     cetirizine (ZyrTEC) 5 MG tablet; Take 1 tablet (5 mg total) by mouth daily    Edentulous  -     Nutritional Supplements (Ensure Active Heart Health) LIQD; Take 1 Bottle by mouth 2 (two) times a day    Right foot pain  -     Ambulatory Referral to Podiatry; Future        Subjective:      Patient ID: Janene Gonzales is a 68 y o  male  Patient presents to the office today for follow up for chronic conditions which include CHF, PAD, deconditioning, prostate ca  He denies any chest pain and his shortness of breath is at baseline  He does complain of generalized decreased strenght since his hospitalization and he feels it is due to him not being physically active  He also complains of decreased appetite and difficulty eating as he is edentulous  He does have meals on wheels but there are a lot of things he can not eat from there  He is worried because he lost 13 lbs since Jan 25th after his lasix was actually reduced  He does follow up with Hem Onc undergoing treatments for prostate ca  The following portions of the patient's history were reviewed and updated as appropriate: allergies, current medications, past family history, past medical history, past social history, past surgical history and problem list     Review of Systems   Constitutional: Negative for activity change, appetite change, chills, diaphoresis, fatigue, fever and unexpected weight change     HENT: Negative for congestion, ear discharge, ear pain, postnasal drip, rhinorrhea, sore throat and trouble swallowing  Eyes: Negative for discharge, itching and visual disturbance  Respiratory: Negative for cough, choking, chest tightness, shortness of breath, wheezing and stridor  Cardiovascular: Negative for chest pain, palpitations and leg swelling  Gastrointestinal: Negative for abdominal distention, abdominal pain, blood in stool, constipation, diarrhea, nausea and vomiting  Genitourinary: Negative for difficulty urinating and hematuria  Musculoskeletal: Negative for arthralgias, back pain, myalgias and neck pain  B/l foot pain- chronic   Skin: Negative for color change and rash  Neurological: Negative for dizziness, tremors, seizures, syncope, weakness, light-headedness, numbness and headaches  Psychiatric/Behavioral: Negative for agitation, confusion and dysphoric mood  The patient is not nervous/anxious  Objective:      /76 (BP Location: Left arm, Patient Position: Sitting, Cuff Size: Standard)   Pulse 76   Temp (!) 97 3 °F (36 3 °C) (Tympanic)   Ht 5' 8" (1 727 m)   Wt 86 5 kg (190 lb 12 8 oz)   SpO2 100%   BMI 29 01 kg/m²        Physical Exam  Vitals and nursing note reviewed  Constitutional:       General: He is not in acute distress  Appearance: Normal appearance  He is well-developed  He is not diaphoretic  HENT:      Head: Normocephalic and atraumatic  Nose: Rhinorrhea present  No congestion  Mouth/Throat:      Mouth: Mucous membranes are moist       Pharynx: Oropharynx is clear  No oropharyngeal exudate or posterior oropharyngeal erythema  Eyes:      General: No scleral icterus  Conjunctiva/sclera: Conjunctivae normal       Pupils: Pupils are equal, round, and reactive to light  Cardiovascular:      Rate and Rhythm: Normal rate and regular rhythm  Heart sounds: Normal heart sounds  No murmur heard        Pulmonary:      Effort: Pulmonary effort is normal  No respiratory distress  Breath sounds: Normal breath sounds  No wheezing or rales  Chest:      Chest wall: No tenderness  Abdominal:      General: Bowel sounds are normal  There is no distension  Palpations: Abdomen is soft  There is no mass  Tenderness: There is no abdominal tenderness  There is no guarding or rebound  Hernia: No hernia is present  Musculoskeletal:         General: No swelling, tenderness or deformity  Normal range of motion  Cervical back: Normal range of motion and neck supple  Right lower leg: No edema  Left lower leg: No edema  Comments: Ambulating with a walker   Skin:     General: Skin is warm and dry  Findings: No erythema or rash  Neurological:      Mental Status: He is alert and oriented to person, place, and time  Cranial Nerves: No cranial nerve deficit  Motor: No abnormal muscle tone  Psychiatric:         Behavior: Behavior normal          Thought Content: Thought content normal          Judgment: Judgment normal            Patient verbalized understanding of all that was discusse today and agrees with plan  He was able to ask all of his questions and comprehensive answers were provided to the best of my ability  Blanco robins

## 2022-02-28 NOTE — ASSESSMENT & PLAN NOTE
Likely multifactorial, due to inactivity and decreased protein intake  PT ordered  Will try to assess calorie needs with dietician assessment

## 2022-02-28 NOTE — ASSESSMENT & PLAN NOTE
Wt Readings from Last 3 Encounters:   02/28/22 86 5 kg (190 lb 12 8 oz)   02/14/22 92 2 kg (203 lb 4 2 oz)   01/27/22 91 6 kg (202 lb)       Lasix has been decreased to 20 mg po daily  Continue lasix 20 mg po daily  He is instructed to follow a 2 g Na /day and 2L /day fluid restriction  Take extra 20 mg of lasix if weight increases by 3 lbs  He does not appear juan be fluid overloaded at today's visit

## 2022-02-28 NOTE — ASSESSMENT & PLAN NOTE
Has lost 13 LBS since 1/25 after his lasix was decreased to 20 mg daily  He feels that this weight loss is mostly due to him not eating much  He reports appetite is decreased but also he is edentulous and can not eat many foods  He does have meals on wheels but says he can't eat much of it due to hard texture or sodium  Will place referral to dietician for guidance and calorie count/needs  Will order Ensure heart healthy with low Na and check insurance coverage  Attention to be paid to maintaining 2 L/day fluid restriction  Cancer treatments may play a role in low appetite as well

## 2022-03-01 ENCOUNTER — CONSULT (OUTPATIENT)
Dept: PALLIATIVE MEDICINE | Facility: CLINIC | Age: 77
End: 2022-03-01
Payer: COMMERCIAL

## 2022-03-01 VITALS
TEMPERATURE: 95.8 F | HEART RATE: 68 BPM | HEIGHT: 68 IN | WEIGHT: 191.14 LBS | RESPIRATION RATE: 16 BRPM | SYSTOLIC BLOOD PRESSURE: 100 MMHG | BODY MASS INDEX: 28.97 KG/M2 | OXYGEN SATURATION: 98 % | DIASTOLIC BLOOD PRESSURE: 62 MMHG

## 2022-03-01 DIAGNOSIS — R29.898 MUSCULAR DECONDITIONING: ICD-10-CM

## 2022-03-01 DIAGNOSIS — G89.3 CANCER RELATED PAIN: ICD-10-CM

## 2022-03-01 DIAGNOSIS — C61 PROSTATE CANCER (HCC): ICD-10-CM

## 2022-03-01 DIAGNOSIS — C79.51 MALIGNANT NEOPLASM METASTATIC TO BONE (HCC): ICD-10-CM

## 2022-03-01 DIAGNOSIS — K08.109 EDENTULOUS: ICD-10-CM

## 2022-03-01 DIAGNOSIS — F33.9 DEPRESSION, RECURRENT (HCC): ICD-10-CM

## 2022-03-01 DIAGNOSIS — I50.42 CHRONIC COMBINED SYSTOLIC AND DIASTOLIC CHF (CONGESTIVE HEART FAILURE) (HCC): Primary | ICD-10-CM

## 2022-03-01 DIAGNOSIS — I25.5 ISCHEMIC CARDIOMYOPATHY: ICD-10-CM

## 2022-03-01 DIAGNOSIS — Z51.5 PALLIATIVE CARE PATIENT: ICD-10-CM

## 2022-03-01 PROCEDURE — 1036F TOBACCO NON-USER: CPT | Performed by: FAMILY MEDICINE

## 2022-03-01 PROCEDURE — 1160F RVW MEDS BY RX/DR IN RCRD: CPT | Performed by: FAMILY MEDICINE

## 2022-03-01 PROCEDURE — 99204 OFFICE O/P NEW MOD 45 MIN: CPT | Performed by: FAMILY MEDICINE

## 2022-03-01 RX ORDER — OXYCODONE HYDROCHLORIDE 10 MG/1
10 TABLET ORAL 3 TIMES DAILY PRN
Qty: 90 TABLET | Refills: 0 | Status: SHIPPED | OUTPATIENT
Start: 2022-03-01 | End: 2022-03-29 | Stop reason: SDUPTHER

## 2022-03-01 NOTE — PROGRESS NOTES
Outpatient Consultation - Palliative and Supportive Care   Cookie Rene 68 y o  male [id number]    Assessment & Plan  Problem List Items Addressed This Visit     Chronic combined systolic and diastolic CHF (congestive heart failure) ([address] ) - Primary    Relevant Orders    Ambulatory Referral to Home Health    Depression, recurrent [facility])    Relevant Orders    Ambulatory Referral to [address] [place] [address] Ean Angela    Ischemic cardiomyopathy    Relevant Orders    Ambulatory Referral to [address] [place] [address] Ean Vince Pantoja    Malignant neoplasm metastatic to bone [facility])    Relevant Orders    Ambulatory Referral to [address] deconditioning    Relevant Orders    Ambulatory Referral to [address] [place] [address] Ean Vince Pantoja    Prostate cancer [facility])    Relevant Medications    oxyCODONE (ROXICODONE) 10 MG TABS    Other Relevant Orders    Ambulatory Referral to [address] [place] [address] Ean Angela      Other Visit Diagnoses     Cancer related pain        Relevant Medications    oxyCODONE (ROXICODONE) 10 MG TABS    Other Relevant Orders    Ambulatory Referral to [facility] patient        Relevant Medications    oxyCODONE (ROXICODONE) 10 MG TABS    Other Relevant Orders    Ambulatory Referral to [address] [place] [address]        Relevant Orders    Ambulatory Referral to [address] [place] [address] Ean Vince Pantoja          Medications adjusted this encounter:  Requested Prescriptions     Signed Prescriptions Disp Refills    oxyCODONE (ROXICODONE) 10 MG TABS 90 tablet 0     Sig: Take 1 tablet (10 mg total) by mouth 3 (three) times a day as needed (cancer pain) Max Daily Amount: 30 mg     Orders Placed This Encounter   Procedures    Ambulatory Referral to [address] [place] [address] Ean Vince Pantoja     Medications Discontinued During This Encounter   Medication Reason    traMADol (ULTRAM) 50 mg tablet    - Meds reconciled to the extent possible  - Pt offered new dose of previously tolerated oxyIR  - Close f/up is suggested    PPS: 48      Royce BAER Anju was seen today for symptoms and planning cares related to above illnesses    Above orders were sent electronically, or provided in hardcopy in clinic  I have reviewed the patient's controlled substance dispensing history in the Prescription Drug Monitoring Program in compliance with the [facility] regulations before prescribing any controlled substances  We appreciate the referral, and wish for him to continue to follow with us  If there are questions or concerns, please contact us through our clinic/answering service 24 hours a day, seven days a week  Nacho Zacarias MD  [address] Palliative and Supportive Care  [phone]        Visit Information    Accompanied By: No one    Source of History: Self    History Limitations: perseverative with limited insigth    Contacts: son Manuel Miranda -    Daughter Kiana Cox - [phone]    History of Present Illness      Trinh Rene is a 68 y o  male who presents as a referral from Dr Margarita Hernandez of  for primary palliative diagnosis of Stage IV prostate cancer, hormone treatment resistant  He follows with Dr Sharon Boxer  There is also a significant cardiovascular history, with heart failure management by Dr Pauline Mcleod, and PAD with distal pain at rest in both feet     Consultation is requested for: symptom management, goal of care assessment and decisional support, disease process education and discussion of prognosis  Pt has demonstrated -- over a year ago now -- progression of his illness (rising PSA and decreasing functional status) despite aggressive therapies and cytotoxic treatments  His side effects vs decompensation in his overall illness was so severe he was admitted to [address] [place] [address] in march 2021  Since that time, he has recovered some function with careful management of his cardiac disease, and less aggressive, less toxic cancer cares  At this time, his treatment plan involves Xofigo for bone-avid disease, +/- Lupron    We note that the patient has demonstrated some disease progression on other hormonal blockers, but that this plan of care is being offered, if not rec'd, by Dr Sinan Toledo  Pt presents to our office with suboptimally controlled pain, ongoing wt loss, poor appetite, and general malaise  His concerns are more related to his back and R foot pain; a Podiatrist today advised him that his unilateral neuropathic symptoms 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  Today, he can't quite recall his medications, he just insists that he needs 'something stronger than tynlenol'  When we pointed out that he has used tramadol and oxyIR recently, he also stated that these medications were not helpful  Pt endorses support from his son, who will drive pt to appts and on other errands  Son does not help with meds  Pt can't name his meds, but has some vague understanding of the indications for his meds  Pertinent Palliative Care Domains    Physical Symptoms:ambulates with difficulty    Psychological Symptoms:moderate anxiety, limited insight    Social Aspects: support system limited - son drives to appts, but pt has no in-home help  He arrives with tattered pant legs and no socks on a relatively cool Spring day  Spiritual Aspects: not addressed      Historical Data  Past Medical History:   Diagnosis Date    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    Coronary artery disease 2001    S/p stenting to circumflex and RCA    HFrEF [facility]heart failure with reduced ejection fraction) [facility] 06/14/2021    High cholesterol     Prostate cancer [facility])      Past Surgical History:   Procedure Laterality Date    CARDIAC ELECTROPHYSIOLOGY PROCEDURE N/A 12/23/2021    Procedure: Implant ICD - bi ventricular;  Surgeon: Tracy Layne MD;  Location: [facility] CARDIAC CATH LAB; Service: Cardiology    CARDIAC SURGERY      [facility] THROMBOENDARTECTMY FEMORAL COMMON Right 7/9/2021    Procedure: ENDARTERECTOMY ARTERIAL FEMORAL;  Surgeon: Maira Raphael DO;  Location: [facility];  Service: Vascular     Social History     Socioeconomic History    Marital status:       Spouse name: Not on file    Number of children: 3    Years of education: Not on file    Highest education level: Not on file   Occupational History    Not on file   Tobacco Use    Smoking status: Former Smoker     Years: 20 00     Quit date: 2002     Years since quittin 6    Smokeless tobacco: Never Used   Vaping Use    Vaping Use: Never used   Substance and Sexual Activity    Alcohol use: Not Currently    Drug use: Not on file    Sexual activity: Not on file   Other Topics Concern    Not on file   Social History Narrative    Not on file     Social Determinants of Health     Financial Resource Strain: Not on file   Food Insecurity: Not on file   Transportation Needs: Not on file   Physical Activity: Not on file   Stress: Not on file   Social Connections: Not on file   Intimate Partner Violence: Not on file   Housing Stability: Not on file     No family history on file  No Known Allergies  Current Outpatient Medications   Medication Sig Dispense Refill    acetaminophen (TYLENOL) 325 mg tablet Take 3 tablets (975 mg total) by mouth every 8 (eight) hours 90 tablet 0    aspirin 81 mg chewable tablet Chew 1 tablet (81 mg total) daily 30 tablet 0    atorvastatin (LIPITOR) 80 mg tablet TAKE 1 TABLET BY MOUTH DAILY WITH DINNER 90 tablet 1    clopidogrel (PLAVIX) 75 mg tablet TAKE 1 TABLET BY MOUTH EVERY DAY 90 tablet 1    ferrous sulfate 325 (65 Fe) mg tablet TAKE 1 TABLET BY MOUTH TWICE A DAY WITH MEALS 60 tablet 1    furosemide (LASIX) 20 mg tablet TAKE 1 TABLET BY MOUTH EVERY DAY 90 tablet 1    gabapentin (NEURONTIN) 100 mg capsule TAKE 3 CAPSULES BY MOUTH 3 TIMES A DAY   180 capsule 0    lidocaine (LIDODERM) 5 % Apply 2 patches topically daily Remove & Discard patch within 12 hours or as directed by MD 15 patch 0    loperamide (IMODIUM) 2 mg capsule Take 1 capsule (2 mg total) by mouth 4 (four) times a day as needed for diarrhea 30 capsule 2    losartan (COZAAR) 25 mg tablet TAKE 1 TABLET BY MOUTH EVERY DAY 90 tablet 1    methocarbamol (ROBAXIN) 500 mg tablet TAKE 1 TABLET BY MOUTH EVERY 6 HOURS 120 tablet 0    metoprolol succinate (TOPROL-XL) 25 mg 24 hr tablet TAKE 1 TABLET BY MOUTH EVERY DAY 90 tablet 2    prochlorperazine (COMPAZINE) 10 mg tablet Take 1 tablet (10 mg total) by mouth every 6 (six) hours as needed for nausea or vomiting 45 tablet 3    abiraterone (ZYTIGA) 250 mg tablet Take 4 tablets (1,000 mg total) by mouth daily (Patient not taking: Reported on 9/16/2021) 30 tablet 2    cetirizine (ZyrTEC) 5 MG tablet Take 1 tablet (5 mg total) by mouth daily 30 tablet 3    docusate sodium (COLACE) 100 mg capsule Take 1 capsule (100 mg total) by mouth 2 (two) times a day (Patient not taking: Reported on 2/28/2022 ) 10 capsule 0    leuprolide (Eligard) 22 5 mg 22  5MG/1 SYRINGE UNDER THE SKIN ONE TIME AS DIRECTED EVERY 3 MONTHS      lidocaine (Lidoderm) 5 % Apply 1 patch topically daily for 14 days Remove & Discard patch within 12 hours or as directed by MD (Patient not taking: Reported on 2/28/2022 ) 14 patch 0    Nutritional Supplements (Ensure Active Heart Health) LIQD Take 1 Bottle by mouth 2 (two) times a day (Patient not taking: Reported on 3/1/2022 ) 06131 mL 3    omeprazole (PriLOSEC) 20 mg delayed release capsule Take 1 capsule (20 mg total) by mouth daily (Patient not taking: Reported on 3/1/2022 ) 90 capsule 1    oxyCODONE (ROXICODONE) 10 MG TABS Take 1 tablet (10 mg total) by mouth 3 (three) times a day as needed (cancer pain) Max Daily Amount: 30 mg 90 tablet 0     No current facility-administered medications for this visit         Review of Systems   Unable to perform ROS: psychiatric disorder (perseverative and discursive)         Vital Signs    /62 (BP Location: Left arm, Patient Position: Sitting, Cuff Size: Standard)   Pulse 68   Temp (!) 95 8 °F (35 4 °C) (Temporal)   Resp 16   Ht 5' 8" (1 727 m)   Wt 86 7 kg (191 lb 2 2 oz)   SpO2 98%   BMI 29 06 kg/m² Physical Exam and Objective Data  Physical Exam  Constitutional:       General: He is not in acute distress  Appearance: He is ill-appearing  He is not toxic-appearing or diaphoretic  Comments: Frail, dissheveled, chronically ill appearing   HENT:      Head: Normocephalic and atraumatic  Right Ear: External ear normal       Left Ear: External ear normal       Mouth/Throat:      Comments: edentulous  Eyes:      General:         Right eye: No discharge  Left eye: No discharge  Conjunctiva/sclera: Conjunctivae normal       Pupils: Pupils are equal, round, and reactive to light  Neck:      Trachea: No tracheal deviation  Cardiovascular:      Rate and Rhythm: Regular rhythm  Tachycardia present  Pulmonary:      Effort: Pulmonary effort is normal  No respiratory distress  Breath sounds: No stridor  Abdominal:      Palpations: Abdomen is soft  Comments: scaphoid   Skin:     General: Skin is warm and dry  Findings: No erythema or rash  Neurological:      General: No focal deficit present  Comments: Orientation is limited  Psychiatric:      Comments: Limited insight, limited judgment           Radiology and Laboratory:  I personally reviewed and interpreted the following results: none new; reviewed imaging reports    60+ minutes was spent face to face with Royce Rene with greater than 50% of the time spent in counseling or coordination of care including discussions of etiology of diagnosis, pathogenesis of diagnosis, risks and benefits of treatment, treatment instructions, follow up requirements and patient and family counseling/involvement in care  All of the patient's questions were answered during this discussion      This note was not shared with the patient due to privacy exception: note includes other individuals

## 2022-03-01 NOTE — Clinical Note
Teammates, pls appreciate patient's medication adherence and possible cognitive injury as we move forward in hiis cares  Will attempt home health eval with PT/OT/SLT, RN to assess safety and medicatoin adherence/understanding  Will appreciate everyone's assist and contribution to the care of this complex elder

## 2022-03-04 ENCOUNTER — TELEPHONE (OUTPATIENT)
Dept: PALLIATIVE MEDICINE | Facility: CLINIC | Age: 77
End: 2022-03-04

## 2022-03-04 ENCOUNTER — TELEPHONE (OUTPATIENT)
Dept: CARDIOLOGY CLINIC | Facility: CLINIC | Age: 77
End: 2022-03-04

## 2022-03-04 ENCOUNTER — TELEPHONE (OUTPATIENT)
Dept: HEMATOLOGY ONCOLOGY | Facility: HOSPITAL | Age: 77
End: 2022-03-04

## 2022-03-04 NOTE — TELEPHONE ENCOUNTER
VN saw pt today and called to clafiy the Lasix dose  Pt has been taking 40 mgqd, but he was decreased to 20 mg qd at [facility] on 1/25/22  She will have him start with the correct dose today  Advised there is new script for 20 mg tabs at [facility]  She will let him know  Did you want any testing?  Lab on 2/14/22- Cr 0 82  k-4 2    Please advise

## 2022-03-04 NOTE — TELEPHONE ENCOUNTER
Call from nursing home from [facility] confirming if pt is still taking xytiga   Pt last office visit note from 1/27 and med rec pt has not been taking xytiga and continues on xofiga and lupron every 3 months

## 2022-03-04 NOTE — TELEPHONE ENCOUNTER
Received fax from [address] [facility] office start of care will be delayed and anticipate start date is 3/4/22

## 2022-03-07 ENCOUNTER — HOSPITAL ENCOUNTER (OUTPATIENT)
Dept: INFUSION CENTER | Facility: CLINIC | Age: 77
Discharge: HOME | End: 2022-03-07
Payer: COMMERCIAL

## 2022-03-07 DIAGNOSIS — Z95.828 PORT-A-CATH IN PLACE: Primary | ICD-10-CM

## 2022-03-07 DIAGNOSIS — K21.9 GASTROESOPHAGEAL REFLUX DISEASE WITHOUT ESOPHAGITIS: ICD-10-CM

## 2022-03-07 DIAGNOSIS — C61 PROSTATE CANCER (HCC): ICD-10-CM

## 2022-03-07 LAB
BASOPHILS # BLD AUTO: 0.04 THOUSANDS/ΜL (ref 0–0.1)
BASOPHILS NFR BLD AUTO: 1 % (ref 0–1)
EOSINOPHIL # BLD AUTO: 0.07 THOUSAND/ΜL (ref 0–0.61)
EOSINOPHIL NFR BLD AUTO: 2 % (ref 0–6)
ERYTHROCYTE [DISTWIDTH] IN BLOOD BY AUTOMATED COUNT: 14.5 % (ref 11.6–15.1)
HCT VFR BLD AUTO: 34.3 % (ref 36.5–49.3)
HGB BLD-MCNC: 11.2 G/DL (ref 12–17)
IMM GRANULOCYTES # BLD AUTO: 0.02 THOUSAND/UL (ref 0–0.2)
IMM GRANULOCYTES NFR BLD AUTO: 0 % (ref 0–2)
LYMPHOCYTES # BLD AUTO: 0.78 THOUSANDS/ΜL (ref 0.6–4.47)
LYMPHOCYTES NFR BLD AUTO: 17 % (ref 14–44)
MCH RBC QN AUTO: 29.2 PG (ref 26.8–34.3)
MCHC RBC AUTO-ENTMCNC: 32.7 G/DL (ref 31.4–37.4)
MCV RBC AUTO: 90 FL (ref 82–98)
MONOCYTES # BLD AUTO: 0.43 THOUSAND/ΜL (ref 0.17–1.22)
MONOCYTES NFR BLD AUTO: 9 % (ref 4–12)
NEUTROPHILS # BLD AUTO: 3.28 THOUSANDS/ΜL (ref 1.85–7.62)
NEUTS SEG NFR BLD AUTO: 71 % (ref 43–75)
NRBC BLD AUTO-RTO: 0 /100 WBCS
PLATELET # BLD AUTO: 161 THOUSANDS/UL (ref 149–390)
PMV BLD AUTO: 10.6 FL (ref 8.9–12.7)
RBC # BLD AUTO: 3.83 MILLION/UL (ref 3.88–5.62)
WBC # BLD AUTO: 4.62 THOUSAND/UL (ref 4.31–10.16)

## 2022-03-07 PROCEDURE — 85025 COMPLETE CBC W/AUTO DIFF WBC: CPT | Performed by: INTERNAL MEDICINE

## 2022-03-07 RX ORDER — OMEPRAZOLE 20 MG/1
20 CAPSULE, DELAYED RELEASE ORAL DAILY
Qty: 90 CAPSULE | Refills: 1 | Status: SHIPPED | OUTPATIENT
Start: 2022-03-07 | End: 2022-03-22 | Stop reason: ALTCHOICE

## 2022-03-07 NOTE — PROGRESS NOTES
Patient arrives to infusion center for lab draw and port flush  Patient offers no acute complaints  R PAC accessed without incident, brisk blood return noted, labs collected and sent as per orders  Port flushed well without resistance  Port deaccessed, bandaid in place  Patient aware of upcoming appts, declines AVS  Patient ambulatory with walker upon DC

## 2022-03-08 ENCOUNTER — EVALUATION (OUTPATIENT)
Dept: PHYSICAL THERAPY | Facility: CLINIC | Age: 77
End: 2022-03-08
Payer: COMMERCIAL

## 2022-03-08 DIAGNOSIS — R26.89 BALANCE PROBLEMS: Primary | ICD-10-CM

## 2022-03-08 DIAGNOSIS — M79.606 ISCHEMIC LEG PAIN: ICD-10-CM

## 2022-03-08 DIAGNOSIS — R53.81 PHYSICAL DECONDITIONING: ICD-10-CM

## 2022-03-08 DIAGNOSIS — I99.8 ISCHEMIC LEG PAIN: ICD-10-CM

## 2022-03-08 PROCEDURE — 97110 THERAPEUTIC EXERCISES: CPT | Performed by: PHYSICAL THERAPIST

## 2022-03-08 PROCEDURE — 97161 PT EVAL LOW COMPLEX 20 MIN: CPT | Performed by: PHYSICAL THERAPIST

## 2022-03-08 RX ORDER — METHOCARBAMOL 500 MG/1
500 TABLET, FILM COATED ORAL EVERY 6 HOURS
Qty: 120 TABLET | Refills: 0 | Status: SHIPPED | OUTPATIENT
Start: 2022-03-08 | End: 2022-03-25

## 2022-03-08 NOTE — PROGRESS NOTES
PT Evaluation    Today's date: 3/8/2022  Patient name: Misbah Murcia  :   MRN: [id number]  Referring provider: Jasmeet Kim MD  Dx:   Encounter Diagnosis     ICD-10-CM    1  Balance problems  R26 89    2  Physical deconditioning  R53 81          Subjective Evaluation     History of Present Illness    Patient presents with c/o imbalance due to having a R groin surgery to clean his vein out as he did not have good flowing blood in his leg  He states the nerve never healed in his foot and that's why it feels like its numb and that there is a lump on the back of his foot  He wants to improve his flexibility and strength to feel better  He has been very sedentary lately  He does feel weak  He did have R foot tingling before having the thromboendartectomy common femoral  He is able to walk c his RW and at home he walks c furniture support    He also has prostate CA in his tailbone which causes LBP  He was riding a motorcycle 2 years ago  Neuro signs: Numbness and tingling on bottom of R foot before surgery  Red flags: none  Occupation:       Pain    At worst pain ratin  Location: LBP    Relieving factors: rollator, medications,  Aggravating factors: walking- 5 mins    Social Support  Lives at home c his son and has a stair lift has been using it for 2 years  He can drive slowly    Patient Goals  Patient goals for therapy: To improve strength, walk, move and do things- ride motorcycle, see son referee     STGs  1  Decrease pain by 20% in 2-4 weeks to improve standing tolerance  2  Improve lumbar ROM to full in 2-4 weeks to improve sit to stand  3  Improve hip strength by 1/3 grade in 2-4 weeks to improve stairs performance to step through pattern  4  Improve static balance c EC to 10sec c min wobble in 4 weeks  LTGs  1  Decrease pain by 60% in 6-8 weeks  2  Improve walking tolerance to >10 minutes in 6-8 weeks to perform community ambulation     3  Perform job/dressing/showering activities independently without pain in 6-8 weeks  4  Improve stairs tolerance to 1 flight  in 8 weeks  Objective Measurements:      SLB: RLE 2 hands 10 sec  LLE 1 hand 10 sec  NBOS EC: max wobble 3 sec  NBOS arms crossed      TUG: 10 33 sec  5XStS: 17 43    FGA:12/30  DGI:13/24- seems more appropriate    Observation:FHP    Observation:  Heel/toe walk:  Gait: foot flat at IC and poor control of DF, forward leaning posture s RW  c RW leans more forwards  Squat: sit to stand good performance s UE support  Reflexes:NT  Sensation: Desensate to medial leg and toes, decreased sensation to L heel  :    Slump: -  Lumbar AROM L R Strength L R   Flex  Min joy  Flex 5 4+   Ext Mod joy 10 reps  Ext 5 4p           Hip A/PROM        Flex 90 tight / 90 tight / Flex 4 4   ER at 90 painf 45 50 ER     IR at 90   IR     Abd   Abd     Ext   Ext             Knee A/PROM   Ankle     Flex   DF 5 5   Ext   PF             Assessment:    Royce Rene is a pleasant 68 y o  male who presents with primary movement impairment diagnosis of postural deficits and decreased balance resulting in pathoanatomical symptoms of LBP and deconditioning  This is limiting  his ability to walk longer distances, drive independently,  And perform more activities independently  The patient's greatest concern is improving his strength  No further referral appears necessary at this time based upon examination results  Etiologic factors include sedentary lifestyle after surgery  Negative prognostic indicators:poor sensation in RLE  Positive prognostic indicators: good attitude  Please contact me if you have any further questions or recommendations   Thank you very much for the kind referral         Plan  Patient would benefit from:Skilled physical therapy  Planned therapy interventions: manual therapy, neuromuscular re-education, stretching, strengthening, therapeutic activities, therapeutic exercise, patient education, home exercise program, and activity modification      Frequency: 2x week  Duration in weeks: 8  Treatment plan discussed with: patient             Goals: Patient's goal is To improve strength, walk, move and do things- ride motorcycle, see son referee    Precautions: prostate CA, R endarterectomy femoral, pacemaker, falls risk- poor sensation in R foot feels like there is a bar in his arch  Dx: balance deficits, LBP- postural deficits          Daily Treatment Diary   Manuals 3/8/2022        Hip flexor stretching                  Therapeutic exercise         bridges 10x        ktc 10x        LP DL #90         HR         ltr 10x        Neuro Re-ed                  NBOS EC         NBOS on foam                           Ther Activity         Sit to stand         sidesteps                  Gait Training         Walking c head turns         6mwt         Modalities

## 2022-03-08 NOTE — TELEPHONE ENCOUNTER
[facility] has faxed a refill request for [facility]  Requesting a refill of methocarbamol (ROBAXIN) 500 mg tablet  Refill to be sent to the [facility] on [address]

## 2022-03-11 ENCOUNTER — OFFICE VISIT (OUTPATIENT)
Dept: PHYSICAL THERAPY | Facility: CLINIC | Age: 77
End: 2022-03-11
Payer: COMMERCIAL

## 2022-03-11 DIAGNOSIS — R26.89 BALANCE PROBLEMS: Primary | ICD-10-CM

## 2022-03-11 DIAGNOSIS — R53.81 PHYSICAL DECONDITIONING: ICD-10-CM

## 2022-03-11 PROCEDURE — 97112 NEUROMUSCULAR REEDUCATION: CPT | Performed by: PHYSICAL THERAPIST

## 2022-03-11 PROCEDURE — 97110 THERAPEUTIC EXERCISES: CPT | Performed by: PHYSICAL THERAPIST

## 2022-03-11 NOTE — PROGRESS NOTES
Daily Note     Today's date: 3/11/2022  Patient name: Toño Hilliard  :   MRN: [id number]  Referring provider: Ariadna Rice MD  Dx:   Encounter Diagnosis     ICD-10-CM    1  Balance problems  R26 89    2  Physical deconditioning  R53 81                   Subjective: He states he is feeling pretty good today  Objective: See treatment diary below      Assessment: Tolerated treatment well  He did have fatigue c sit to stands  He tolerated session well with some pain  Patient would benefit from continued PT      Plan: Continue per plan of care        Goals: Patient's goal is To improve strength, walk, move and do things- ride motorcycle, see son referee    Precautions: prostate CA, R endarterectomy femoral, pacemaker, falls risk- poor sensation in R foot feels like there is a bar in his arch  Dx: balance deficits, LBP- postural deficits          Daily Treatment Diary   Manuals 3/8/2022 3/11       Hip flexor stretching                  Therapeutic exercise         bridges 10x 10x2       ktc 10x        LP DL #90         HR  20x       ltr 10x        Neuro Re-ed                  NBOS EC  2x :20       NBOS on foam                           Ther Activity         Sit to stand  2x10       sidesteps  5x                Gait Training         Walking c head turns         6mwt         Modalities

## 2022-03-14 ENCOUNTER — OFFICE VISIT (OUTPATIENT)
Dept: PHYSICAL THERAPY | Facility: CLINIC | Age: 77
End: 2022-03-14
Payer: COMMERCIAL

## 2022-03-14 DIAGNOSIS — R63.4 WEIGHT LOSS: ICD-10-CM

## 2022-03-14 DIAGNOSIS — R53.81 PHYSICAL DECONDITIONING: Primary | ICD-10-CM

## 2022-03-14 DIAGNOSIS — R29.898 MUSCULAR DECONDITIONING: ICD-10-CM

## 2022-03-14 DIAGNOSIS — R26.89 BALANCE PROBLEMS: ICD-10-CM

## 2022-03-14 PROCEDURE — 97110 THERAPEUTIC EXERCISES: CPT | Performed by: PHYSICAL THERAPIST

## 2022-03-14 PROCEDURE — 97140 MANUAL THERAPY 1/> REGIONS: CPT | Performed by: PHYSICAL THERAPIST

## 2022-03-14 PROCEDURE — 97112 NEUROMUSCULAR REEDUCATION: CPT | Performed by: PHYSICAL THERAPIST

## 2022-03-14 NOTE — PROGRESS NOTES
Daily Note     Today's date: 3/14/2022  Patient name: Eloise Patino  :   MRN: [id number]  Referring provider: Mandy Saleem MD  Dx:   Encounter Diagnosis     ICD-10-CM    1  Physical deconditioning  R53 81    2  Balance problems  R26 89                   Subjective: He states he did not have any soreness post session  Objective: See treatment diary below      Assessment: Tolerated treatment well  He did have fatigue c walking due to R calf tightening up  He was progressed c reps today  Patient would benefit from continued PT      Plan: Continue per plan of care        Goals: Patient's goal is To improve strength, walk, move and do things- ride motorcycle, see son referee    Precautions: prostate CA, R endarterectomy femoral, pacemaker, falls risk- poor sensation in R foot feels like there is a bar in his arch  Dx: balance deficits, LBP- postural deficits          Daily Treatment Diary   Manuals 3/8/2022 3/11 3/14      Hip flexor stretching   8'               Therapeutic exercise         bridges 10x 10x2 20x2      ktc 10x  5x :10      LP DL #90   20x pin 5       HR  20x 20x      ltr 10x        Neuro Re-ed                  NBOS EC  2x :20       NBOS on foam                           Ther Activity         Sit to stand  2x10 2x10      sidesteps  5x 5x               Gait Training         Walking c head turns         6mwt   58 sec 1 lap      Modalities

## 2022-03-18 ENCOUNTER — OFFICE VISIT (OUTPATIENT)
Dept: PHYSICAL THERAPY | Facility: CLINIC | Age: 77
End: 2022-03-18
Payer: COMMERCIAL

## 2022-03-18 DIAGNOSIS — R26.89 BALANCE PROBLEMS: ICD-10-CM

## 2022-03-18 DIAGNOSIS — R29.898 MUSCULAR DECONDITIONING: ICD-10-CM

## 2022-03-18 DIAGNOSIS — R53.81 PHYSICAL DECONDITIONING: Primary | ICD-10-CM

## 2022-03-18 PROCEDURE — 97110 THERAPEUTIC EXERCISES: CPT | Performed by: PHYSICAL THERAPIST

## 2022-03-18 PROCEDURE — 97112 NEUROMUSCULAR REEDUCATION: CPT | Performed by: PHYSICAL THERAPIST

## 2022-03-18 PROCEDURE — 97140 MANUAL THERAPY 1/> REGIONS: CPT | Performed by: PHYSICAL THERAPIST

## 2022-03-18 NOTE — PROGRESS NOTES
Daily Note     Today's date: 3/18/2022  Patient name: Jaclyn Ochoa  :   MRN: [id number]  Referring provider: Joseph Dorantes MD  Dx:   Encounter Diagnosis     ICD-10-CM    1  Physical deconditioning  R53 81    2  Balance problems  R26 89    3  Muscular deconditioning  R29 898                   Subjective: He states he is feeling pretty good today  Objective: See treatment diary below      Assessment: Tolerated treatment well  After LP and sit to stand he did have pain in back and L thigh  He progressed c walking time but was fatigued and needed to finish session early due to pain  Supine quad st performed  Patient would benefit from continued PT      Plan: Continue per plan of care        Goals: Patient's goal is To improve strength, walk, move and do things- ride motorcycle, see son referee    Precautions: prostate CA, R endarterectomy femoral, pacemaker, falls risk- poor sensation in R foot feels like there is a bar in his arch  Dx: balance deficits, LBP- postural deficits          Daily Treatment Diary   Manuals 3/8/2022 3/11 3/14 3/18     Hip flexor stretching   8' 8'              Therapeutic exercise         bridges 10x 10x2 20x2      ktc 10x  5x :10      LP DL #90   20x pin 5  20x pin 4      HR  20x 20x 20x     Supine quad st    45 sec each     ltr 10x        Neuro Re-ed                  NBOS EC  2x :20  2x :20     NBOS on foam                           Ther Activity         Sit to stand  2x10 2x10 15x     sidesteps  5x 5x               Gait Training         Walking c head turns         6mwt   58 sec 1 lap 1 min 18 sec     Modalities

## 2022-03-21 ENCOUNTER — APPOINTMENT (OUTPATIENT)
Dept: LAB | Facility: CLINIC | Age: 77
End: 2022-03-21
Payer: COMMERCIAL

## 2022-03-21 DIAGNOSIS — C61 PROSTATE CANCER (HCC): ICD-10-CM

## 2022-03-21 DIAGNOSIS — I73.9 PAD (PERIPHERAL ARTERY DISEASE) (HCC): ICD-10-CM

## 2022-03-21 DIAGNOSIS — M79.606 ISCHEMIC LEG PAIN: ICD-10-CM

## 2022-03-21 DIAGNOSIS — T78.40XD ALLERGY, SUBSEQUENT ENCOUNTER: ICD-10-CM

## 2022-03-21 DIAGNOSIS — I99.8 ISCHEMIC LEG PAIN: ICD-10-CM

## 2022-03-21 LAB
BASOPHILS # BLD AUTO: 0.03 THOUSANDS/ΜL (ref 0–0.1)
BASOPHILS NFR BLD AUTO: 1 % (ref 0–1)
EOSINOPHIL # BLD AUTO: 0.07 THOUSAND/ΜL (ref 0–0.61)
EOSINOPHIL NFR BLD AUTO: 2 % (ref 0–6)
ERYTHROCYTE [DISTWIDTH] IN BLOOD BY AUTOMATED COUNT: 14.3 % (ref 11.6–15.1)
HCT VFR BLD AUTO: 37.5 % (ref 36.5–49.3)
HGB BLD-MCNC: 12.1 G/DL (ref 12–17)
IMM GRANULOCYTES # BLD AUTO: 0.03 THOUSAND/UL (ref 0–0.2)
IMM GRANULOCYTES NFR BLD AUTO: 1 % (ref 0–2)
LYMPHOCYTES # BLD AUTO: 0.94 THOUSANDS/ΜL (ref 0.6–4.47)
LYMPHOCYTES NFR BLD AUTO: 30 % (ref 14–44)
MCH RBC QN AUTO: 29.5 PG (ref 26.8–34.3)
MCHC RBC AUTO-ENTMCNC: 32.3 G/DL (ref 31.4–37.4)
MCV RBC AUTO: 92 FL (ref 82–98)
MONOCYTES # BLD AUTO: 0.31 THOUSAND/ΜL (ref 0.17–1.22)
MONOCYTES NFR BLD AUTO: 10 % (ref 4–12)
NEUTROPHILS # BLD AUTO: 1.72 THOUSANDS/ΜL (ref 1.85–7.62)
NEUTS SEG NFR BLD AUTO: 56 % (ref 43–75)
NRBC BLD AUTO-RTO: 0 /100 WBCS
PLATELET # BLD AUTO: 190 THOUSANDS/UL (ref 149–390)
PMV BLD AUTO: 9.8 FL (ref 8.9–12.7)
RBC # BLD AUTO: 4.1 MILLION/UL (ref 3.88–5.62)
WBC # BLD AUTO: 3.1 THOUSAND/UL (ref 4.31–10.16)

## 2022-03-21 PROCEDURE — 85025 COMPLETE CBC W/AUTO DIFF WBC: CPT

## 2022-03-21 PROCEDURE — 36415 COLL VENOUS BLD VENIPUNCTURE: CPT

## 2022-03-22 ENCOUNTER — TELEPHONE (OUTPATIENT)
Dept: INTERNAL MEDICINE CLINIC | Facility: CLINIC | Age: 77
End: 2022-03-22

## 2022-03-22 DIAGNOSIS — K21.9 GASTROESOPHAGEAL REFLUX DISEASE WITHOUT ESOPHAGITIS: Primary | ICD-10-CM

## 2022-03-22 RX ORDER — FAMOTIDINE 20 MG/1
20 TABLET, FILM COATED ORAL DAILY
Qty: 30 TABLET | Refills: 0 | Status: SHIPPED | OUTPATIENT
Start: 2022-03-22 | End: 2022-04-18

## 2022-03-22 RX ORDER — CETIRIZINE HYDROCHLORIDE 5 MG/1
5 TABLET ORAL DAILY
Qty: 90 TABLET | Refills: 1 | OUTPATIENT
Start: 2022-03-22

## 2022-03-22 NOTE — TELEPHONE ENCOUNTER
Patient's pharmacy had called our office regarding concerns with drug interactions between his Plavix and Prilosec  He is taking Plavix for PAD and Prilosec for GERD  I called patient, has issues with hearing over the phone, so I spoke with his son who was present at home with him during this call  He should continue his Plavix as directed but we can stop omeprazole and switch to famotidine  Will send famotidine to patient's preferred pharmacy  Patient's son verbalized understanding of above plan  All questions and concerns were addressed

## 2022-03-22 NOTE — TELEPHONE ENCOUNTER
Royce Rene has requested a refill of cetirizine (ZyrTEC) 5 mg tablet  Request too soon as refill was ordered 3 weeks ago by Dr Judge Thompson with a 90 day supply with 1 refill   Will deny refill request

## 2022-03-22 NOTE — TELEPHONE ENCOUNTER
Gallo from [address] has called the [facility] office in regards to [employer]  Aby Jeffers stated there is a drug interaction between Free's omeprazole (PriLOSEC) 20 mg delayed release capsule and clopidogrel (PLAVIX) 75 mg tablet  Gallo wished to know if the  office would approve a change in Free's PPI

## 2022-03-23 ENCOUNTER — OFFICE VISIT (OUTPATIENT)
Dept: PHYSICAL THERAPY | Facility: CLINIC | Age: 77
End: 2022-03-23
Payer: COMMERCIAL

## 2022-03-23 DIAGNOSIS — R29.898 MUSCULAR DECONDITIONING: ICD-10-CM

## 2022-03-23 DIAGNOSIS — R26.89 BALANCE PROBLEMS: ICD-10-CM

## 2022-03-23 DIAGNOSIS — R53.81 PHYSICAL DECONDITIONING: Primary | ICD-10-CM

## 2022-03-23 PROCEDURE — 97112 NEUROMUSCULAR REEDUCATION: CPT | Performed by: PHYSICAL THERAPIST

## 2022-03-23 PROCEDURE — 97140 MANUAL THERAPY 1/> REGIONS: CPT | Performed by: PHYSICAL THERAPIST

## 2022-03-23 PROCEDURE — 97110 THERAPEUTIC EXERCISES: CPT | Performed by: PHYSICAL THERAPIST

## 2022-03-23 NOTE — PROGRESS NOTES
Daily Note     Today's date: 3/23/2022  Patient name: Jaclyn Ochoa  :   MRN: [id number]  Referring provider: Joseph Dorantes MD  Dx:   Encounter Diagnosis     ICD-10-CM    1  Physical deconditioning  R53 81    2  Balance problems  R26 89    3  Muscular deconditioning  R29 898                   Subjective: He states he is sleeping 5-6 hours a night and he feels more energy t/o day  Objective: See treatment diary below      Assessment: Tolerated treatment well  After sit to stand he did have pain in R calf  He felt good c quad st  He did have some fatigue t/o visit and was given rest breaks  Close supervision given t/o session  Patient would benefit from continued PT      Plan: Continue per plan of care        Goals: Patient's goal is To improve strength, walk, move and do things- ride motorcycle, see son referee    Precautions: prostate CA, R endarterectomy femoral, pacemaker, falls risk- poor sensation in R foot feels like there is a bar in his arch  Dx: balance deficits, LBP- postural deficits          Daily Treatment Diary   Manuals 3/8/2022 3/11 3/14 3/18 3/23    Hip flexor stretching   8' 8' 8'             Therapeutic exercise         bridges 10x 10x2 20x2      ktc 10x  5x :10      LP DL #90   20x pin 5  20x pin 4  20x pin 5    HR  20x 20x 20x 20x    Supine quad st    45 sec each 1 min each    ltr 10x        Neuro Re-ed                  NBOS EC  2x :20  2x :20 2x :20    NBOS on foam     2x :20    Foam marching     15x             Ther Activity         Sit to stand  2x10 2x10 15x 14 reps fatigue    sidesteps  5x 5x               Gait Training         Walking c head turns         6mwt   58 sec 1 lap 1 min 18 sec 1 min 16 sec 2 laps    Modalities

## 2022-03-24 ENCOUNTER — HOSPITAL ENCOUNTER (OUTPATIENT)
Dept: RADIOLOGY | Age: 77
Discharge: HOME | End: 2022-03-24
Payer: COMMERCIAL

## 2022-03-24 DIAGNOSIS — C61 MALIGNANT NEOPLASM OF PROSTATE (HCC): ICD-10-CM

## 2022-03-24 DIAGNOSIS — T78.40XD ALLERGY, SUBSEQUENT ENCOUNTER: ICD-10-CM

## 2022-03-24 PROCEDURE — A9606 RADIUM RA223 DICHLORIDE THER: HCPCS

## 2022-03-24 PROCEDURE — 79101 NUCLEAR RX IV ADMIN: CPT

## 2022-03-25 RX ORDER — CETIRIZINE HYDROCHLORIDE 5 MG/1
5 TABLET ORAL DAILY
Qty: 90 TABLET | Refills: 1 | OUTPATIENT
Start: 2022-03-25

## 2022-03-25 RX ORDER — METHOCARBAMOL 500 MG/1
TABLET, FILM COATED ORAL
Qty: 120 TABLET | Refills: 0 | Status: SHIPPED | OUTPATIENT
Start: 2022-03-25 | End: 2022-05-02

## 2022-03-25 RX ORDER — GABAPENTIN 100 MG/1
CAPSULE ORAL
Qty: 180 CAPSULE | Refills: 0 | Status: SHIPPED | OUTPATIENT
Start: 2022-03-25 | End: 2022-04-18

## 2022-03-25 NOTE — TELEPHONE ENCOUNTER
Royce Rene has requested a refill of certirizine (ZyrTEC) 5 mg tablet  Refill too soon  Last ordered 3 weeks ago by Dr Benjamin Morales  Pt was given a 3 month supply with 1 refill   Will refuse refill request

## 2022-03-25 NOTE — TELEPHONE ENCOUNTER
Royce Rene has requested a refill of gabapentin (NEURONTIN) 100 mg capsule and methocarbamol (ROBAXIN) 500 mg tablet  [facility]  Would a refill be appropriate?

## 2022-03-28 ENCOUNTER — OFFICE VISIT (OUTPATIENT)
Dept: PHYSICAL THERAPY | Facility: CLINIC | Age: 77
End: 2022-03-28
Payer: COMMERCIAL

## 2022-03-28 DIAGNOSIS — R53.81 PHYSICAL DECONDITIONING: Primary | ICD-10-CM

## 2022-03-28 DIAGNOSIS — C61 PROSTATE CANCER (HCC): ICD-10-CM

## 2022-03-28 DIAGNOSIS — R26.89 BALANCE PROBLEMS: ICD-10-CM

## 2022-03-28 PROCEDURE — 97116 GAIT TRAINING THERAPY: CPT

## 2022-03-28 PROCEDURE — 97112 NEUROMUSCULAR REEDUCATION: CPT

## 2022-03-28 RX ORDER — PROCHLORPERAZINE MALEATE 10 MG
10 TABLET ORAL EVERY 6 HOURS PRN
Qty: 45 TABLET | Refills: 3 | Status: SHIPPED | OUTPATIENT
Start: 2022-03-28

## 2022-03-28 NOTE — PROGRESS NOTES
Daily Note     Today's date: 3/28/2022  Patient name: Janene Gonzales  :   MRN: [id number]  Referring provider: Richard Burnett MD  Dx:   Encounter Diagnosis     ICD-10-CM    1  Physical deconditioning  R53 81    2  Balance problems  R26 89                   Subjective: patient continues to use SPC while ambulating  No complaints of falls  Objective: See treatment diary below      Assessment: Tolerated treatment well  Patient able to increase walking tolerance before b/l LE pain occured  Plan: Continue per plan of care        Goals: Patient's goal is To improve strength, walk, move and do things- ride motorcycle, see son referee    Precautions: prostate CA, R endarterectomy femoral, pacemaker, falls risk- poor sensation in R foot feels like there is a bar in his arch  Dx: balance deficits, LBP- postural deficits          Daily Treatment Diary   Manuals 3/8/2022 3/11 3/14 3/18 3/23 3/28    Hip flexor stretching   8' 8' 8' 8            Therapeutic exercise         bridges 10x 10x2 20x2      ktc 10x  5x :10      LP DL #90   20x pin 5  20x pin 4  20x pin 5 92 5# 20x pin 5    HR  20x 20x 20x 20x 20   Supine quad st    45 sec each 1 min each    ltr 10x        Bike      6 min    Neuro Re-ed                  NBOS EC  2x :20  2x :20 2x :20 2 x 20"    NBOS on foam     2x :20 20" x 2    Foam marching     15x 15x            Ther Activity         Sit to stand  2x10 2x10 15x 14 reps fatigue 15    sidesteps  5x 5x               Gait Training         Walking c head turns         6mwt   58 sec 1 lap 1 min 18 sec 1 min 16 sec 2 laps 2 min 17 sec 2 laps   Modalities

## 2022-03-28 NOTE — TELEPHONE ENCOUNTER
Bao Rene has left a message to the [facility] office requesting a refill of prochlorperazine (COMPAZINE) 10 mg tablet  Refill to be sent to the [facility] on [address]

## 2022-03-29 ENCOUNTER — OFFICE VISIT (OUTPATIENT)
Dept: PALLIATIVE MEDICINE | Facility: CLINIC | Age: 77
End: 2022-03-29
Payer: COMMERCIAL

## 2022-03-29 ENCOUNTER — TELEPHONE (OUTPATIENT)
Dept: HEMATOLOGY ONCOLOGY | Facility: CLINIC | Age: 77
End: 2022-03-29

## 2022-03-29 VITALS
HEART RATE: 70 BPM | TEMPERATURE: 97.4 F | RESPIRATION RATE: 16 BRPM | BODY MASS INDEX: 28.76 KG/M2 | SYSTOLIC BLOOD PRESSURE: 100 MMHG | DIASTOLIC BLOOD PRESSURE: 70 MMHG | OXYGEN SATURATION: 88 % | WEIGHT: 189.15 LBS

## 2022-03-29 DIAGNOSIS — Z51.5 PALLIATIVE CARE PATIENT: ICD-10-CM

## 2022-03-29 DIAGNOSIS — C61 PROSTATE CANCER (HCC): ICD-10-CM

## 2022-03-29 DIAGNOSIS — G89.3 CANCER RELATED PAIN: ICD-10-CM

## 2022-03-29 PROCEDURE — 99213 OFFICE O/P EST LOW 20 MIN: CPT | Performed by: FAMILY MEDICINE

## 2022-03-29 RX ORDER — OXYCODONE HYDROCHLORIDE 10 MG/1
10 TABLET ORAL 3 TIMES DAILY PRN
Qty: 90 TABLET | Refills: 0 | Status: SHIPPED | OUTPATIENT
Start: 2022-03-29 | End: 2022-05-09 | Stop reason: SDUPTHER

## 2022-03-29 NOTE — PROGRESS NOTES
Outpatient Follow-Up - Palliative and Supportive Care   Flip Rene 68 y o  male [id number]    Assessment & Plan  Problem List Items Addressed This Visit     Prostate cancer ([address] )    Relevant Medications    oxyCODONE (ROXICODONE) 10 MG TABS      Other Visit Diagnoses     Cancer related pain        Relevant Medications    oxyCODONE (ROXICODONE) 10 MG TABS    Palliative care patient        Relevant Medications    oxyCODONE (ROXICODONE) 10 MG TABS          Medications adjusted this encounter:  Requested Prescriptions     Signed Prescriptions Disp Refills    oxyCODONE (ROXICODONE) 10 MG TABS 90 tablet 0     Sig: Take 1 tablet (10 mg total) by mouth 3 (three) times a day as needed (cancer pain) Max Daily Amount: 30 mg     No orders of the defined types were placed in this encounter  Medications Discontinued During This Encounter   Medication Reason    docusate sodium (COLACE) 100 mg capsule     lidocaine (Lidoderm) 5 %     Nutritional Supplements (Ensure Active Heart Health) LIQD     oxyCODONE (ROXICODONE) 10 MG TABS Reorder   - Meds refilled x1 mos  - ONgoing close f/up in clinic   - Family advised to keep a close eye on pt's function and orientation  Lucinda Situ was seen today for symptoms and planning cares related to above illnesses  I have reviewed the patient's controlled substance dispensing history in the Prescription Drug Monitoring Program in compliance with the [facility] regulations before prescribing any controlled substances  They are invited to continue to follow with us  If there are questions or concerns, please contact us through our clinic/answering service 24 hours a day, seven days a week      Ami Freire MD  [facility] Palliative and Supportive Care        Visit Information    Accompanied By: Family member    Source of History: Self, Family member    History Limitations: limited health literacy    Contacts: son Leanna Joshua - [phone]               Daughter Sid Ayala - [phone]    History of Present Illness      Karolyn Rene is a 68 y o  male who presents in follow up of symptoms related to Stage IV prostate cancer, hormone treatment resistant  He follow with Dr Cecil Aponte and Ms Erik Coffman for this  There is also a significant cardiovascular history, with heart failure management by Dr Jennifer Dunham, and PAD with distal pain at rest in both feet  Pertinent issues include: symptom management, disease process education and discussion of prognosis      Since last visit, he is haivng hot flashes and weakness and diarrhea since starting therapies  Pt has limited insight to his meds  Taking meds not as Rxed; not getting help from family on supervision  Son drives to appts  Later in the visit, as the pt was endorsing unsteadiness while standing and ambulating to toilet, his son was called to visit  Neither party had good insight to medications, and we advised son Mary Diamond to help provide more support and presence in the home as treatments are ongoing  From our first visit on 3/1:      "  Pt has demonstrated -- over a year ago now -- progression of his illness (rising PSA and decreasing functional status) despite aggressive therapies and cytotoxic treatments  His side effects vs decompensation in his overall illness was so severe he was admitted to [address] [place] [address] in march 2021  Since that time, he has recovered some function with careful management of his cardiac disease, and less aggressive, less toxic cancer cares  At this time, his treatment plan involves Xofigo for bone-avid disease, +/- Lupron  We note that the patient has demonstrated some disease progression on other hormonal blockers, but that this plan of care is being offered, if not rec'd, by Dr Cecil Aponte  Pt presents to our office with suboptimally controlled pain, ongoing wt loss, poor appetite, and general malaise    His concerns are more related to his back and R foot pain; a Podiatrist today advised him that his unilateral neuropathic symptoms 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        Today, he can't quite recall his medications, he just insists that he needs 'something stronger than tynlenol'  When we pointed out that he has used tramadol and oxyIR recently, he also stated that these medications were not helpful  Pt endorses support from his son, who will drive pt to appts and on other errands  Son does not help with meds  Pt can't name his meds, but has some vague understanding of the indications for his meds "    Past medical, surgical, social, and family histories are reviewed and pertinent updates are made  Review of Systems   Unable to perform ROS: mental status change (limited insight and poor recall)         Vital Signs    /70 (BP Location: Left arm, Cuff Size: Standard)   Pulse 70   Temp (!) 97 4 °F (36 3 °C) (Temporal)   Resp 16   Wt 85 8 kg (189 lb 2 5 oz)   SpO2 (!) 88%   BMI 28 76 kg/m²     Physical Exam and Objective Data  Physical Exam  Constitutional:       General: He is in acute distress (toddles to bathroom almost as soon as I enter encounter)  Appearance: He is ill-appearing  He is not toxic-appearing or diaphoretic  Comments: Frail   HENT:      Head: Normocephalic and atraumatic  Right Ear: External ear normal       Left Ear: External ear normal    Eyes:      General:         Right eye: No discharge  Left eye: No discharge  Conjunctiva/sclera: Conjunctivae normal       Pupils: Pupils are equal, round, and reactive to light  Neck:      Trachea: No tracheal deviation  Cardiovascular:      Rate and Rhythm: Regular rhythm  Tachycardia present  Pulmonary:      Effort: Pulmonary effort is normal  No respiratory distress  Breath sounds: No stridor  Abdominal:      Palpations: Abdomen is soft  Comments: scaphoid   Skin:     General: Skin is warm and dry  Coloration: Skin is pale  Findings: No erythema or rash        Comments: Poor turgor   Neurological:      General: No focal deficit present  Mental Status: Mental status is at baseline  He is disoriented  Cranial Nerves: No cranial nerve deficit  Psychiatric:      Comments: Judgment limited; insight poor           Radiology and Laboratory:  I personally reviewed and interpreted the following results: none new    30+ minutes was spent face to face with Royce Rene and his family with greater than 50% of the time spent in counseling or coordination of care including: chart review; symptom pursuit; supportive listening; anticipatory guidance  All of the patient's or agent's questions were answered during this discussion      This note was not shared with the patient due to privacy exception: note includes other individuals

## 2022-03-30 ENCOUNTER — OFFICE VISIT (OUTPATIENT)
Dept: PHYSICAL THERAPY | Facility: CLINIC | Age: 77
End: 2022-03-30
Payer: COMMERCIAL

## 2022-03-30 DIAGNOSIS — R53.81 PHYSICAL DECONDITIONING: Primary | ICD-10-CM

## 2022-03-30 DIAGNOSIS — K21.9 GASTROESOPHAGEAL REFLUX DISEASE WITHOUT ESOPHAGITIS: ICD-10-CM

## 2022-03-30 DIAGNOSIS — R29.898 MUSCULAR DECONDITIONING: ICD-10-CM

## 2022-03-30 DIAGNOSIS — R26.89 BALANCE PROBLEMS: ICD-10-CM

## 2022-03-30 PROCEDURE — 97112 NEUROMUSCULAR REEDUCATION: CPT | Performed by: PHYSICAL THERAPIST

## 2022-03-30 PROCEDURE — 97140 MANUAL THERAPY 1/> REGIONS: CPT | Performed by: PHYSICAL THERAPIST

## 2022-03-30 PROCEDURE — 97110 THERAPEUTIC EXERCISES: CPT | Performed by: PHYSICAL THERAPIST

## 2022-03-30 RX ORDER — FAMOTIDINE 20 MG/1
TABLET, FILM COATED ORAL
Qty: 30 TABLET | Refills: 0 | OUTPATIENT
Start: 2022-03-30

## 2022-03-30 NOTE — PROGRESS NOTES
Daily Note     Today's date: 3/30/2022  Patient name: Jules Barry  :   MRN: [id number]  Referring provider: Lynda Salguero MD  Dx:   Encounter Diagnosis     ICD-10-CM    1  Physical deconditioning  R53 81    2  Balance problems  R26 89    3  Muscular deconditioning  R29 898                   Subjective: Pt has no complaints and is continuing to improve c balance  He states his R foot is feeling better as he does not notice the bar sensation under the ball of his foot as much  Objective: See treatment diary below      Assessment: Tolerated treatment well  Patient was progressed c prone press up  But did have R arm soreness  He showed good motivation today with progressing with amount of walking  Plan: Continue per plan of care        Goals: Patient's goal is To improve strength, walk, move and do things- ride motorcycle, see son referee    Precautions: prostate CA, R endarterectomy femoral, pacemaker, falls risk- poor sensation in R foot feels like there is a bar in his arch  Dx: balance deficits, LBP- postural deficits          Daily Treatment Diary   Manuals 3/30 3/11 3/14 3/18 3/23 3/28    Hip flexor stretching 8'  8' 8' 8' 8            Therapeutic exercise         bridges 20x 10x2 20x2      ktc   5x :10      LP DL #90 np by mistake  20x pin 5  20x pin 4  20x pin 5 92 5# 20x pin 5    HR 20x 20x 20x 20x 20x 20   Supine quad st 1 mins each   45 sec each 1 min each    ltr         Bike 6'     6 min    Neuro Re-ed                  NBOS EC 2x  2x :20  2x :20 2x :20 2 x 20"    NBOS on foam     2x :20 20" x 2    Foam marching 15x    15x 15x            Ther Activity         Sit to stand 15x 2x10 2x10 15x 14 reps fatigue 15    sidesteps 5x  5x 5x               Gait Training         Walking c head turns         6mwt 1 min 47 sec 3 laps  58 sec 1 lap 1 min 18 sec 1 min 16 sec 2 laps 2 min 17 sec 2 laps   Modalities

## 2022-03-30 NOTE — TELEPHONE ENCOUNTER
Royce Rene has requested a refill of famotidine (PEPCID) 20 mg tablet  Refill request too soon  Prescription last refilled 1 week ago by Dr Channing Hilario, with a 30 day supply   Will refuse refill request

## 2022-04-01 DIAGNOSIS — D64.9 ANEMIA, UNSPECIFIED TYPE: ICD-10-CM

## 2022-04-01 RX ORDER — FERROUS SULFATE 325(65) MG
TABLET ORAL
Qty: 60 TABLET | Refills: 1 | Status: SHIPPED | OUTPATIENT
Start: 2022-04-01 | End: 2022-05-02 | Stop reason: SDUPTHER

## 2022-04-06 ENCOUNTER — TELEPHONE (OUTPATIENT)
Dept: HEMATOLOGY ONCOLOGY | Facility: CLINIC | Age: 77
End: 2022-04-06

## 2022-04-06 ENCOUNTER — OFFICE VISIT (OUTPATIENT)
Dept: PHYSICAL THERAPY | Facility: CLINIC | Age: 77
End: 2022-04-06
Payer: COMMERCIAL

## 2022-04-06 DIAGNOSIS — R26.89 BALANCE PROBLEMS: ICD-10-CM

## 2022-04-06 DIAGNOSIS — C61 PROSTATE CANCER (HCC): Primary | ICD-10-CM

## 2022-04-06 DIAGNOSIS — R29.898 MUSCULAR DECONDITIONING: ICD-10-CM

## 2022-04-06 DIAGNOSIS — R63.4 WEIGHT LOSS: ICD-10-CM

## 2022-04-06 DIAGNOSIS — R53.81 PHYSICAL DECONDITIONING: Primary | ICD-10-CM

## 2022-04-06 PROCEDURE — 97140 MANUAL THERAPY 1/> REGIONS: CPT | Performed by: PHYSICAL THERAPIST

## 2022-04-06 PROCEDURE — 97112 NEUROMUSCULAR REEDUCATION: CPT | Performed by: PHYSICAL THERAPIST

## 2022-04-06 PROCEDURE — 97110 THERAPEUTIC EXERCISES: CPT

## 2022-04-06 NOTE — PROGRESS NOTES
PT Re-Evaluation    Today's date: 2022  Patient name: Adam Rosado  :   MRN: [id number]  Referring provider: Karishma Padron MD  Dx:   Encounter Diagnosis     ICD-10-CM    1  Physical deconditioning  R53 81    2  Balance problems  R26 89    3  Muscular deconditioning  R29 898    4  Weight loss  R63 4          Subjective Evaluation     History of Present Illness    He states his balance and walking is much better  He rarely uses the cane at home and feels good about his progress  His pain is far better as well  He states his sensation of the bar under his foot is better  He is able to drive but his eyesight is the problem  He states he is also sleeping better since starting PT and is not hurting as much  He is getting up to 6 hrs of sleep  Hx of injury:  Patient presents with c/o imbalance due to having a R groin surgery to clean his vein out as he did not have good flowing blood in his leg  He states the nerve never healed in his foot and that's why it feels like its numb and that there is a lump on the back of his foot  He wants to improve his flexibility and strength to feel better  He has been very sedentary lately  He does feel weak  He did have R foot tingling before having the thromboendartectomy common femoral  He is able to walk c his RW and at home he walks c furniture support    He also has prostate CA in his tailbone which causes LBP  He was riding a motorcycle 2 years ago  Neuro signs: Numbness and tingling on bottom of R foot before surgery  Red flags: none  Occupation:       Pain    At worst pain ratin  Location: LBP  A little  Relieving factors: rollator, medications,  Aggravating factors: walking- 2 mins 30 sec    Social Support  Lives at home c his son and has a stair lift has been using it for 2 years  He can drive slowly    Patient Goals  Patient goals for therapy:  To improve strength, walk, move and do things- ride motorcycle, see son referee STGs  1  Decrease pain by 20% in 2-4 weeks to improve standing tolerance  - met  2  Improve lumbar ROM to full in 2-4 weeks to improve sit to stand  - met  3  Improve hip strength by 1/3 grade in 2-4 weeks to improve stairs performance to step through pattern -met  4  Improve static balance c EC to 10sec c min wobble in 4 weeks  - met partially    LTGs  1  Decrease pain by 60% in 6-8 weeks  2  Improve walking tolerance to >10 minutes in 6-8 weeks to perform community ambulation  3  Perform job/dressing/showering activities independently without pain in 6-8 weeks  4  Improve stairs tolerance to 1 flight  in 8 weeks  Objective Measurements:      SLB: RLE 1 hand 10 sec  LLE 1 finger 10 sec  NBOS EC: no wobble 20 sec  NBOS arms crossed      5XStS: 17 43   NT    DGI:15/24-   Observation:FHP    Observation:  Heel/toe walk:  Gait: R trendelenburg, forward leaning posture c cane  Squat: sit to stand good performance s UE support  Reflexes:NT  Sensation: Desensate to toes, decreased sensation to L heel  Stairs: step through pattern c rail  And cane    Slump: -  Lumbar AROM L R Strength L R   Flex  Min joy  Flex 5 4+   Ext Mod joy 10 reps  Ext 5 4+           Hip A/PROM        Flex 105 tight / 105 tight / Flex 4+ 4   ER at 90 50 50 ER     IR at 90   IR     Abd   Abd 3 3-   Ext   Ext             Knee A/PROM   Ankle     Flex   DF 5 5   Ext   PF             Assessment:    Royce Rene has demonstrated overall improvement in ROM, strength, balance, sleep,  function, and a reduction in pain  Currently, he has made steady progress towards his goals, but continues to remain limited with Lumbar ROM, SL balance, decreased sensation in R foot, and decreased hip strength  At this time, skilled physical therapy is warranted to address the remaining impairments and aide in return to independent walking s use of an AD  Limiting factors to therapy decreased sensation in R foot and he  demonstrates good motivation  Plan  Patient would benefit from:Skilled physical therapy  Planned therapy interventions: manual therapy, neuromuscular re-education, stretching, strengthening, therapeutic activities, therapeutic exercise, patient education, home exercise program, and activity modification      Frequency: 2x week  Duration in weeks: 8  Treatment plan discussed with: patient             Goals: Patient's goal is To improve strength, walk, move and do things- ride motorcycle, see son referee    Precautions: prostate CA, R endarterectomy femoral, pacemaker, falls risk- poor sensation in R foot feels like there is a bar in his arch  Dx: balance deficits, LBP- postural deficits hip abd weakness          Daily Treatment Diary   Manuals 3/30 4/6 3/14 3/18 3/23 3/28    Hip flexor stretching 8' nv 8' 8' 8' 8            Therapeutic exercise         bridges 20x  20x2      Seated lumbar flex  10x assess ext nv       LP DL #90 np by mistake  20x pin 5  20x pin 4  20x pin 5 92 5# 20x pin 5    HR 20x 20x 20x 20x 20x 20   S/l clamshells  10x B       Supine hip abd slide board                  Supine quad st 1 mins each   45 sec each 1 min each    ltr         Bike 6' 6'    6 min    Neuro Re-ed                  NBOS EC 2x  2x :20  2x :20 2x :20 2 x 20"    NBOS on foam     2x :20 20" x 2    Foam marching 15x 15x   15x 15x            Ther Activity         Sit to stand 15x 2x10 2x10 15x 14 reps fatigue 15    sidesteps 5x   5x               Gait Training         Walking c head turns         6mwt 1 min 47 sec 3 laps 2 min 30 sec 58 sec 1 lap 1 min 18 sec 1 min 16 sec 2 laps 2 min 17 sec 2 laps   Modalities

## 2022-04-06 NOTE — TELEPHONE ENCOUNTER
CALL TRANSFER   Reason for patient call? Alissa Mendoza from [facility] calling requesting blood work order for patient needed prior to transfusion  Patient's primary oncologist? Dr Yonas Jones    RN call was transferred to and time it was transferred? Martir   Informed patient that the message will be forwarded to the team and someone will get back to them as soon as possible    Did you relay this information to the patient?  Yes [phone]

## 2022-04-08 ENCOUNTER — OFFICE VISIT (OUTPATIENT)
Dept: PHYSICAL THERAPY | Facility: CLINIC | Age: 77
End: 2022-04-08
Payer: COMMERCIAL

## 2022-04-08 DIAGNOSIS — R29.898 MUSCULAR DECONDITIONING: ICD-10-CM

## 2022-04-08 DIAGNOSIS — R53.81 PHYSICAL DECONDITIONING: Primary | ICD-10-CM

## 2022-04-08 DIAGNOSIS — R63.4 WEIGHT LOSS: ICD-10-CM

## 2022-04-08 DIAGNOSIS — R26.89 BALANCE PROBLEMS: ICD-10-CM

## 2022-04-08 PROCEDURE — 97140 MANUAL THERAPY 1/> REGIONS: CPT | Performed by: PHYSICAL THERAPIST

## 2022-04-08 PROCEDURE — 97110 THERAPEUTIC EXERCISES: CPT | Performed by: PHYSICAL THERAPIST

## 2022-04-08 PROCEDURE — 97112 NEUROMUSCULAR REEDUCATION: CPT | Performed by: PHYSICAL THERAPIST

## 2022-04-08 NOTE — PROGRESS NOTES
Daily Note     Today's date: 2022  Patient name: Petty Franks  :   MRN: [id number]  Referring provider: Kyleigh Abrams MD  Dx:   Encounter Diagnosis     ICD-10-CM    1  Physical deconditioning  R53 81    2  Balance problems  R26 89    3  Muscular deconditioning  R29 898    4  Weight loss  R63 4                   Subjective: He states he is feeling good today  Objective: See treatment diary below    10x EIS R calf pain worse and LBP  Seated flexion also brought B leg pain no worse  Assessment: Tolerated treatment fair  He was fatigued today after performing lumbar ROM assessment  Will continue c progression of cardio and balance exercises  Patient would benefit from continued PT      Plan: Continue per plan of care        Goals: Patient's goal is To improve strength, walk, move and do things- ride motorcycle, see son referee    Precautions: prostate CA, R endarterectomy femoral, pacemaker, falls risk- poor sensation in R foot feels like there is a bar in his arch  Dx: balance deficits, LBP- postural deficits hip abd weakness          Daily Treatment Diary   Manuals 3/30 4/6 4/8 3/18 3/23 3/28    Hip flexor stretching 8' nv 8' 8' 8' 8            Therapeutic exercise         bridges 20x  20x2      Seated lumbar flex  10x assess ext nv 10x      LP DL #90 np by mistake  20x pin 5  20x pin 4  20x pin 5 92 5# 20x pin 5    HR 20x 20x 20x 20x 20x 20   S/l clamshells  10x B 20x      Supine hip abd slide board                  Supine quad st 1 mins each   45 sec each 1 min each    ltr         Bike 6' 6' 6'   6 min    Neuro Re-ed                  NBOS EC 2x  2x :20  2x :20 2x :20 2 x 20"    NBOS on foam     2x :20 20" x 2    Foam marching 15x 15x   15x 15x            Ther Activity         Sit to stand 15x 2x10 2x10 15x 14 reps fatigue 15    sidesteps 5x   5x               Gait Training         Walking c head turns         6mwt 1 min 47 sec 3 laps 2 min 30 sec 2min 26sec 1 min 18 sec 1 min 16 sec 2 laps 2 min 17 sec 2 laps   Modalities

## 2022-04-11 ENCOUNTER — OFFICE VISIT (OUTPATIENT)
Dept: PHYSICAL THERAPY | Facility: CLINIC | Age: 77
End: 2022-04-11
Payer: COMMERCIAL

## 2022-04-11 DIAGNOSIS — R53.81 PHYSICAL DECONDITIONING: Primary | ICD-10-CM

## 2022-04-11 DIAGNOSIS — R26.89 BALANCE PROBLEMS: ICD-10-CM

## 2022-04-11 PROCEDURE — 97110 THERAPEUTIC EXERCISES: CPT | Performed by: PHYSICAL THERAPIST

## 2022-04-11 PROCEDURE — 97112 NEUROMUSCULAR REEDUCATION: CPT | Performed by: PHYSICAL THERAPIST

## 2022-04-11 PROCEDURE — 97140 MANUAL THERAPY 1/> REGIONS: CPT | Performed by: PHYSICAL THERAPIST

## 2022-04-11 NOTE — PROGRESS NOTES
Daily Note     Today's date: 2022  Patient name: Gael Dominguez  :   MRN: [id number]  Referring provider: Pepper Gordon MD  Dx:   Encounter Diagnosis     ICD-10-CM    1  Physical deconditioning  R53 81    2  Balance problems  R26 89                 Subjective: He states he was sore for one day after last session but then felt better the next day  He does not like to walk because the R foot hurts and it is numb  Objective: See treatment diary below      Assessment: Tolerated treatment fair  He was progressed c hip abduction strengthening  He was not able to tolerate hip abd on slideboard due to foot going into ER  Patient would benefit from continued PT      Plan: Continue per plan of care        Goals: Patient's goal is To improve strength, walk, move and do things- ride motorcycle, see son referee    Precautions: prostate CA, R endarterectomy femoral, pacemaker, falls risk- poor sensation in R foot feels like there is a bar in his arch  Dx: balance deficits, LBP- postural deficits hip abd weakness          Daily Treatment Diary   Manuals 3/30 4/6 4/8 4/11 3/23 3/28    Hip flexor stretching 8' nv 8' 8' 8' 8            Therapeutic exercise         bridges 20x  20x2 2x20     Seated lumbar flex  10x assess ext nv 10x      LP DL #90 np by mistake  20x pin 5    20x pin 5 92 5# 20x pin 5    HR 20x 20x 20x 20x 20x 20   S/l clamshells  10x B 20x 20x              Prone press up    10x     Supine quad st 1 mins each   1 min each 1 min each    ltr         Bike 6' 6' 6' 6'  6 min    Neuro Re-ed         Hip abd isometric    10x :05     NBOS EC 2x  2x :20  2x :20 2x :20 2 x 20"    NBOS on foam     2x :20 20" x 2    Foam marching 15x 15x   15x 15x            Ther Activity         Sit to stand 15x 2x10 2x10 15x 14 reps fatigue 15    sidesteps 5x   5x 5 laps              Gait Training         Walking c head turns         6mwt 1 min 47 sec 3 laps 2 min 30 sec 2min 26sec 1 min 36 sec 1 min 16 sec 2 laps 2 min 17 sec 2 laps   Modalities

## 2022-04-12 ENCOUNTER — IN-CLINIC DEVICE VISIT (OUTPATIENT)
Dept: CARDIOLOGY CLINIC | Facility: CLINIC | Age: 77
End: 2022-04-12
Payer: COMMERCIAL

## 2022-04-12 DIAGNOSIS — Z95.810 BIVENTRICULAR ICD (IMPLANTABLE CARDIOVERTER-DEFIBRILLATOR) IN PLACE: Primary | ICD-10-CM

## 2022-04-12 DIAGNOSIS — K59.1 FUNCTIONAL DIARRHEA: ICD-10-CM

## 2022-04-12 PROCEDURE — 93284 PRGRMG EVAL IMPLANTABLE DFB: CPT | Performed by: INTERNAL MEDICINE

## 2022-04-12 RX ORDER — LOPERAMIDE HYDROCHLORIDE 2 MG/1
2 CAPSULE ORAL 4 TIMES DAILY PRN
Qty: 30 CAPSULE | Refills: 2 | Status: SHIPPED | OUTPATIENT
Start: 2022-04-12 | End: 2022-05-09 | Stop reason: SDUPTHER

## 2022-04-12 NOTE — PROGRESS NOTES
Results for orders placed or performed in visit on 04/12/22   Cardiac EP device report    Narrative    [facility] BI-V ICD -ACTIVE SYSTEM IS MRI CONDITIONAL  DEVICE INTERROGATED IN THE [facility] OFFICE: BATTERY VOLTAGE ADEQUATE (6 7 YRS)  DEVICE SOFTWARE UPGRADE COMPLETE  AP 35% BVP 98% (DDDR 60); ALL LEAD PARAMETERS WITHIN NORMAL LIMITS  4 AMS EPISODE WITH PAT ON EGRM'S STORED (NO AF)  AMS BURDEN <0 1%  NO HIGH RATE EPISODES OR THERAPIES  EF 25% (11/17/2021 ECHO)  PATIENT ON ASA 81, CLOPIDOGREL, METOPROLOL SUCC  CORVUE IMPEDANCE MONITORING WITHIN NORMAL LIMITS  DECREASE MADE TO RA AMPLITUDE TO PROMOTE DEVICE LONGEVITY WHILE MAINTAINING AN APPROPRIATE SAFETY MARGIN  NORMAL DEVICE FUNCTION    ES

## 2022-04-13 ENCOUNTER — OFFICE VISIT (OUTPATIENT)
Dept: PHYSICAL THERAPY | Facility: CLINIC | Age: 77
End: 2022-04-13
Payer: COMMERCIAL

## 2022-04-13 DIAGNOSIS — R63.4 WEIGHT LOSS: ICD-10-CM

## 2022-04-13 DIAGNOSIS — R53.81 PHYSICAL DECONDITIONING: Primary | ICD-10-CM

## 2022-04-13 DIAGNOSIS — R29.898 MUSCULAR DECONDITIONING: ICD-10-CM

## 2022-04-13 DIAGNOSIS — R26.89 BALANCE PROBLEMS: ICD-10-CM

## 2022-04-13 PROCEDURE — 97140 MANUAL THERAPY 1/> REGIONS: CPT | Performed by: PHYSICAL THERAPIST

## 2022-04-13 PROCEDURE — 97110 THERAPEUTIC EXERCISES: CPT | Performed by: PHYSICAL THERAPIST

## 2022-04-13 PROCEDURE — 97116 GAIT TRAINING THERAPY: CPT | Performed by: PHYSICAL THERAPIST

## 2022-04-13 NOTE — PROGRESS NOTES
Daily Note     Today's date: 2022  Patient name: Tenzin Manning  :   MRN: [id number]  Referring provider: Maryam Ureña MD  Dx:   Encounter Diagnosis     ICD-10-CM    1  Physical deconditioning  R53 81    2  Muscular deconditioning  R29 898    3  Balance problems  R26 89    4  Weight loss  R63 4                 Subjective: He states he did not feel any worse after last visit  Objective: See treatment diary below      Assessment: Tolerated treatment fair  We did progress him to outdoor walking on varied surfaces c good tolerance  He did fatigue and needed few rest breaks  Patient would benefit from continued PT      Plan: Continue per plan of care        Goals: Patient's goal is To improve strength, walk, move and do things- ride motorcycle, see son referee    Precautions: prostate CA, R endarterectomy femoral, pacemaker, falls risk- poor sensation in R foot feels like there is a bar in his arch  Dx: balance deficits, LBP- postural deficits hip abd weakness          Daily Treatment Diary   Manuals 3/30 4/6 4/8 4/11 4/13 3/28    Hip flexor stretching 8' nv 8' 8' 8' 8            Therapeutic exercise         bridges 20x  20x2 2x20 x20    Seated lumbar flex  10x assess ext nv 10x      LP DL #90 np by mistake  20x pin 5    nv 92 5# 20x pin 5    HR 20x 20x 20x 20x 20x 20   S/l clamshells  10x B 20x 20x 20x             Prone press up    10x 2x10    Supine quad st 1 mins each   1 min each     ltr         Bike 6' 6' 6' 6' 6' 6 min    Neuro Re-ed         Hip abd isometric    10x :05     NBOS EC 2x  2x :20  2x :20  2 x 20"    NBOS on foam      20" x 2    Foam marching 15x 15x    15x            Ther Activity         Sit to stand 15x 2x10 2x10 15x 10x 15    sidesteps 5x   5x 5 laps     Stairs cane and rail     2 flights    Gait Training         Walking outside     18'    6mwt 1 min 47 sec 3 laps 2 min 30 sec 2min 26sec 1 min 36 sec  2 min 17 sec 2 laps   Modalities

## 2022-04-15 DIAGNOSIS — I73.9 PAD (PERIPHERAL ARTERY DISEASE) (HCC): ICD-10-CM

## 2022-04-15 RX ORDER — GABAPENTIN 100 MG/1
300 CAPSULE ORAL 3 TIMES DAILY
Qty: 180 CAPSULE | Refills: 0 | Status: CANCELLED | OUTPATIENT
Start: 2022-04-15

## 2022-04-18 ENCOUNTER — APPOINTMENT (OUTPATIENT)
Dept: LAB | Facility: CLINIC | Age: 77
End: 2022-04-18
Payer: COMMERCIAL

## 2022-04-18 ENCOUNTER — HOSPITAL ENCOUNTER (OUTPATIENT)
Dept: INFUSION CENTER | Facility: CLINIC | Age: 77
Discharge: HOME | End: 2022-04-18
Payer: COMMERCIAL

## 2022-04-18 DIAGNOSIS — K21.9 GASTROESOPHAGEAL REFLUX DISEASE WITHOUT ESOPHAGITIS: ICD-10-CM

## 2022-04-18 DIAGNOSIS — C61 PROSTATE CANCER (HCC): Primary | ICD-10-CM

## 2022-04-18 DIAGNOSIS — I73.9 PAD (PERIPHERAL ARTERY DISEASE) (HCC): ICD-10-CM

## 2022-04-18 DIAGNOSIS — C61 PROSTATE CANCER (HCC): ICD-10-CM

## 2022-04-18 DIAGNOSIS — T78.40XD ALLERGY, SUBSEQUENT ENCOUNTER: ICD-10-CM

## 2022-04-18 LAB
ALBUMIN SERPL BCP-MCNC: 3.6 G/DL (ref 3.5–5)
ALP SERPL-CCNC: 76 U/L (ref 46–116)
ALT SERPL W P-5'-P-CCNC: 23 U/L (ref 12–78)
ANION GAP SERPL CALCULATED.3IONS-SCNC: 12 MMOL/L (ref 4–13)
AST SERPL W P-5'-P-CCNC: 17 U/L (ref 5–45)
BASOPHILS # BLD AUTO: 0.04 THOUSANDS/ΜL (ref 0–0.1)
BASOPHILS NFR BLD AUTO: 1 % (ref 0–1)
BILIRUB SERPL-MCNC: 0.51 MG/DL (ref 0.2–1)
BUN SERPL-MCNC: 15 MG/DL (ref 5–25)
CALCIUM SERPL-MCNC: 8.7 MG/DL (ref 8.3–10.1)
CHLORIDE SERPL-SCNC: 106 MMOL/L (ref 100–108)
CO2 SERPL-SCNC: 23 MMOL/L (ref 21–32)
CREAT SERPL-MCNC: 0.82 MG/DL (ref 0.6–1.3)
EOSINOPHIL # BLD AUTO: 0.13 THOUSAND/ΜL (ref 0–0.61)
EOSINOPHIL NFR BLD AUTO: 3 % (ref 0–6)
ERYTHROCYTE [DISTWIDTH] IN BLOOD BY AUTOMATED COUNT: 14.1 % (ref 11.6–15.1)
GFR SERPL CREATININE-BSD FRML MDRD: 85 ML/MIN/1.73SQ M
GLUCOSE SERPL-MCNC: 89 MG/DL (ref 65–140)
HCT VFR BLD AUTO: 36.8 % (ref 36.5–49.3)
HGB BLD-MCNC: 11.9 G/DL (ref 12–17)
IMM GRANULOCYTES # BLD AUTO: 0.06 THOUSAND/UL (ref 0–0.2)
IMM GRANULOCYTES NFR BLD AUTO: 1 % (ref 0–2)
LYMPHOCYTES # BLD AUTO: 1.02 THOUSANDS/ΜL (ref 0.6–4.47)
LYMPHOCYTES NFR BLD AUTO: 22 % (ref 14–44)
MCH RBC QN AUTO: 29.7 PG (ref 26.8–34.3)
MCHC RBC AUTO-ENTMCNC: 32.3 G/DL (ref 31.4–37.4)
MCV RBC AUTO: 92 FL (ref 82–98)
MONOCYTES # BLD AUTO: 0.53 THOUSAND/ΜL (ref 0.17–1.22)
MONOCYTES NFR BLD AUTO: 11 % (ref 4–12)
NEUTROPHILS # BLD AUTO: 2.87 THOUSANDS/ΜL (ref 1.85–7.62)
NEUTS SEG NFR BLD AUTO: 62 % (ref 43–75)
NRBC BLD AUTO-RTO: 0 /100 WBCS
PLATELET # BLD AUTO: 225 THOUSANDS/UL (ref 149–390)
PMV BLD AUTO: 9.6 FL (ref 8.9–12.7)
POTASSIUM SERPL-SCNC: 3.9 MMOL/L (ref 3.5–5.3)
PROT SERPL-MCNC: 6.8 G/DL (ref 6.4–8.2)
RBC # BLD AUTO: 4.01 MILLION/UL (ref 3.88–5.62)
SODIUM SERPL-SCNC: 141 MMOL/L (ref 136–145)
WBC # BLD AUTO: 4.65 THOUSAND/UL (ref 4.31–10.16)

## 2022-04-18 PROCEDURE — 85025 COMPLETE CBC W/AUTO DIFF WBC: CPT

## 2022-04-18 PROCEDURE — 80053 COMPREHEN METABOLIC PANEL: CPT

## 2022-04-18 PROCEDURE — 36415 COLL VENOUS BLD VENIPUNCTURE: CPT

## 2022-04-18 PROCEDURE — 96402 CHEMO HORMON ANTINEOPL SQ/IM: CPT

## 2022-04-18 RX ORDER — CETIRIZINE HYDROCHLORIDE 5 MG/1
5 TABLET ORAL DAILY
Qty: 90 TABLET | Refills: 1 | OUTPATIENT
Start: 2022-04-18

## 2022-04-18 RX ORDER — GABAPENTIN 100 MG/1
CAPSULE ORAL
Qty: 180 CAPSULE | Refills: 0 | Status: SHIPPED | OUTPATIENT
Start: 2022-04-18 | End: 2022-05-05

## 2022-04-18 RX ORDER — FAMOTIDINE 20 MG/1
TABLET, FILM COATED ORAL
Qty: 30 TABLET | Refills: 0 | Status: SHIPPED | OUTPATIENT
Start: 2022-04-18 | End: 2022-05-05

## 2022-04-18 RX ADMIN — LEUPROLIDE ACETATE 22.5 MG: KIT at 11:10

## 2022-04-18 NOTE — PROGRESS NOTES
Patient to [facility] for Lupron: Offers no complaints at present time: Lab work ( 03/21/22 ) reviewed: Within parameters to treat: Injection given in Buttocks ( RUQ ) without incident: No adverse reactions noted: No further appt's scheduled:  Will make upon discharge: AVS offered and declined

## 2022-04-18 NOTE — TELEPHONE ENCOUNTER
Royce Rene has requested a refill of cetirizine (ZyrTEC) 5 mg tablet  Pt was given a 3 month supply with 1 refill 1 month ago  Will refuse refill request as it is too soon

## 2022-04-18 NOTE — TELEPHONE ENCOUNTER
Royce Rene has requested a refill of gabapentin (NEURONTIN) 100 mg capsule and famotidine (PEPCID) 20 mg tablet  Pt meets the requirements placed by [facility]  Will refill medications

## 2022-04-19 ENCOUNTER — OFFICE VISIT (OUTPATIENT)
Dept: PHYSICAL THERAPY | Facility: CLINIC | Age: 77
End: 2022-04-19
Payer: COMMERCIAL

## 2022-04-19 DIAGNOSIS — R29.898 MUSCULAR DECONDITIONING: ICD-10-CM

## 2022-04-19 DIAGNOSIS — R53.81 PHYSICAL DECONDITIONING: Primary | ICD-10-CM

## 2022-04-19 PROCEDURE — 97112 NEUROMUSCULAR REEDUCATION: CPT | Performed by: PHYSICAL THERAPIST

## 2022-04-19 PROCEDURE — 97110 THERAPEUTIC EXERCISES: CPT | Performed by: PHYSICAL THERAPIST

## 2022-04-19 NOTE — PROGRESS NOTES
Daily Note     Today's date: 2022  Patient name: Kajal Vega  :   MRN: [id number]  Referring provider: Madai Worley MD  Dx:   Encounter Diagnosis     ICD-10-CM    1  Physical deconditioning  R53 81    2  Muscular deconditioning  R29 898                 Subjective: He states he was fatigued after last visit  Objective: See treatment diary below  /85mm Hg    Assessment: Tolerated treatment fair although did get some dizziness when turning from R s /l to L  BP was assessed and was [facility] Patient would benefit from continued PT      Plan: Continue per plan of care        Goals: Patient's goal is To improve strength, walk, move and do things- ride motorcycle, see son referee    Precautions: prostate CA, R endarterectomy femoral, pacemaker, falls risk- poor sensation in R foot feels like there is a bar in his arch  Dx: balance deficits, LBP- postural deficits hip abd weakness          Daily Treatment Diary   Manuals 3/30 4/6 4/8 4/11 4/13 4/19   Hip flexor stretching 8' nv 8' 8' 8' 8            Therapeutic exercise         bridges 20x  20x2 2x20 x20 x30   Seated lumbar flex  10x assess ext nv 10x      LP DL #90 np by mistake  20x pin 5    nv 20x   HR 20x 20x 20x 20x 20x 20   S/l clamshells  10x B 20x 20x 20x 20x            Prone press up    10x 2x10    Supine quad st 1 mins each   1 min each     ltr         Bike 6' 6' 6' 6' 6' 6 min    Neuro Re-ed         Hip abd isometric    10x :05     NBOS EC 2x  2x :20  2x :20  2 x 20"    NBOS on foam      20" x 2    Foam marching 15x 15x    15x            Ther Activity         Sit to stand 15x 2x10 2x10 15x 10x 2x10   sidesteps 5x   5x 5 laps     Stairs cane and rail     2 flights    Gait Training         Walking outside     18'    6mwt 1 min 47 sec 3 laps 2 min 30 sec 2min 26sec 1 min 36 sec     Modalities

## 2022-04-21 ENCOUNTER — HOSPITAL ENCOUNTER (OUTPATIENT)
Dept: RADIOLOGY | Age: 77
Discharge: HOME | End: 2022-04-21
Payer: COMMERCIAL

## 2022-04-21 PROCEDURE — 79101 NUCLEAR RX IV ADMIN: CPT

## 2022-04-21 PROCEDURE — A9606 RADIUM RA223 DICHLORIDE THER: HCPCS

## 2022-04-22 ENCOUNTER — OFFICE VISIT (OUTPATIENT)
Dept: PHYSICAL THERAPY | Facility: CLINIC | Age: 77
End: 2022-04-22
Payer: COMMERCIAL

## 2022-04-22 DIAGNOSIS — R63.4 WEIGHT LOSS: ICD-10-CM

## 2022-04-22 DIAGNOSIS — R53.81 PHYSICAL DECONDITIONING: Primary | ICD-10-CM

## 2022-04-22 DIAGNOSIS — R26.89 BALANCE PROBLEMS: ICD-10-CM

## 2022-04-22 DIAGNOSIS — R29.898 MUSCULAR DECONDITIONING: ICD-10-CM

## 2022-04-22 PROCEDURE — 97110 THERAPEUTIC EXERCISES: CPT

## 2022-04-22 PROCEDURE — 97530 THERAPEUTIC ACTIVITIES: CPT

## 2022-04-22 PROCEDURE — 97112 NEUROMUSCULAR REEDUCATION: CPT

## 2022-04-22 NOTE — PROGRESS NOTES
Daily Note     Today's date: 2022  Patient name: Merlyn Ring  : [birth year]  MRN: [id number]  Referring provider: Jazzmine Rice MD  Dx:   Encounter Diagnosis     ICD-10-CM    1  Physical deconditioning  R53 81    2  Muscular deconditioning  R29 898    3  Balance problems  R26 89    4  Weight loss  R63 4        Start Time: 1200  Stop Time: 1239  Total time in clinic (min): 39 minutes  Subjective:Pt reports that his main complaint is LBP and R LE pain  No falls since last visit  He notes he did receive radiation yesterday  Objective: See treatment diary below      Assessment: Tolerated treatment well  Patient would benefit from continued PT      Plan: Continue per plan of care        Goals: Patient's goal is To improve strength, walk, move and do things- ride motorcycle, see son referee    Precautions: prostate CA, R endarterectomy femoral, pacemaker, falls risk- poor sensation in R foot feels like there is a bar in his arch  Dx: balance deficits, LBP- postural deficits hip abd weakness          Daily Treatment Diary   Manuals    Hip flexor stretching 8'  8' 8' 8' 8            Therapeutic exercise         bridges x30  20x2 2x20 x20 x30   Seated lumbar flex   10x      LP DL #90 95# 20x  20x pin 5    nv 20x   HR 20x  20x 20x 20x 20   S/l clamshells 2x10  20x 20x 20x 20x            Prone press up 2x10   10x 2x10    Supine quad st    1 min each     ltr         Bike 6'   6' 6' 6' 6 min    Neuro Re-ed         Hip abd isometric    10x :05     NBOS EC 30"x2   2x :20  2 x 20"    NBOS on foam 30"x2     20" x 2    Foam marching 20     15x            Ther Activity         Sit to stand 2x10  2x10 15x 10x 2x10   sidesteps 5 laps   5x 5 laps     Stairs cane and rail     2 flights    Gait Training         Walking outside     18'    6mwt   2min 26sec 1 min 36 sec     Modalities

## 2022-04-25 ENCOUNTER — OFFICE VISIT (OUTPATIENT)
Dept: PHYSICAL THERAPY | Facility: CLINIC | Age: 77
End: 2022-04-25
Payer: COMMERCIAL

## 2022-04-25 VITALS — SYSTOLIC BLOOD PRESSURE: 112 MMHG | DIASTOLIC BLOOD PRESSURE: 70 MMHG

## 2022-04-25 DIAGNOSIS — R53.81 PHYSICAL DECONDITIONING: Primary | ICD-10-CM

## 2022-04-25 DIAGNOSIS — R29.898 MUSCULAR DECONDITIONING: ICD-10-CM

## 2022-04-25 PROCEDURE — 97140 MANUAL THERAPY 1/> REGIONS: CPT | Performed by: PHYSICAL THERAPIST

## 2022-04-25 PROCEDURE — 97112 NEUROMUSCULAR REEDUCATION: CPT | Performed by: PHYSICAL THERAPIST

## 2022-04-25 PROCEDURE — 97110 THERAPEUTIC EXERCISES: CPT | Performed by: PHYSICAL THERAPIST

## 2022-04-25 NOTE — PROGRESS NOTES
Daily Note     Today's date: 2022  Patient name: Terry Carlisle  :   MRN: [id number]  Referring provider: Ernie Craig MD  Dx:   Encounter Diagnosis     ICD-10-CM    1  Physical deconditioning  R53 81    2  Muscular deconditioning  R29 898                 Subjective:Pt reports he felt fine after feeling dizzy last session  Today he is feeling quite good  Objective: See treatment diary below      Assessment: Tolerated treatment with lightheadedness  BP was measured and not out of the normal  He does run low with his BP readings  While walking today he did have unsteadiness while walking  Perhaps this was caused by vestibular deficits due to lying on side and moving from prone/supine  Patient would benefit from continued PT      Plan: Continue per plan of care  Will assess vestibular system nv        Goals: Patient's goal is To improve strength, walk, move and do things- ride motorcycle, see son referee    Precautions: prostate CA, R endarterectomy femoral, pacemaker, falls risk- poor sensation in R foot feels like there is a bar in his arch  Dx: balance deficits, LBP- postural deficits hip abd weakness          Daily Treatment Diary   Manuals    Hip flexor stretching 8' 8' 8' 8' 8' 8            Therapeutic exercise         bridges x30 30x 20x2 2x20 x20 x30   Seated lumbar flex   10x      LP DL #90 pin 4 95# 20x 20x 95#  20x pin 5    nv 20x   HR 20x 20x 20x 20x 20x 20   S/l clamshells 2x10 20x 20x 20x 20x 20x            Prone press up 2x10 2x10   10x 2x10    Supine quad st    1 min each     ltr         Bike 6'  6' 6' 6' 6' 6 min    Neuro Re-ed         Hip abd isometric    10x :05     NBOS EC 30"x2 nv  2x :20  2 x 20"    NBOS on foam 30"x2     20" x 2    Foam marching 20     15x            Ther Activity         Sit to stand 2x10 2x10 2x10 15x 10x 2x10   sidesteps 5 laps  5 laps 5x 5 laps     Stairs cane and rail     2 flights    Gait Training         Walking outside 18'    6mwt  1 min 18sec 2min 26sec 1 min 36 sec     Modalities

## 2022-04-26 ENCOUNTER — OFFICE VISIT (OUTPATIENT)
Dept: CARDIOLOGY CLINIC | Facility: CLINIC | Age: 77
End: 2022-04-26
Payer: COMMERCIAL

## 2022-04-26 VITALS
SYSTOLIC BLOOD PRESSURE: 98 MMHG | HEART RATE: 79 BPM | OXYGEN SATURATION: 93 % | WEIGHT: 193.4 LBS | DIASTOLIC BLOOD PRESSURE: 72 MMHG | BODY MASS INDEX: 30.35 KG/M2 | HEIGHT: 67 IN

## 2022-04-26 DIAGNOSIS — Z95.810 BIVENTRICULAR ICD (IMPLANTABLE CARDIOVERTER-DEFIBRILLATOR) IN PLACE: ICD-10-CM

## 2022-04-26 DIAGNOSIS — Z95.2 S/P TAVR (TRANSCATHETER AORTIC VALVE REPLACEMENT): ICD-10-CM

## 2022-04-26 DIAGNOSIS — I25.10 CORONARY ARTERY DISEASE INVOLVING NATIVE CORONARY ARTERY OF NATIVE HEART WITHOUT ANGINA PECTORIS: ICD-10-CM

## 2022-04-26 DIAGNOSIS — I50.22 CHRONIC HFREF (HEART FAILURE WITH REDUCED EJECTION FRACTION) (HCC): Primary | ICD-10-CM

## 2022-04-26 PROCEDURE — 1160F RVW MEDS BY RX/DR IN RCRD: CPT | Performed by: INTERNAL MEDICINE

## 2022-04-26 PROCEDURE — 1036F TOBACCO NON-USER: CPT | Performed by: INTERNAL MEDICINE

## 2022-04-26 PROCEDURE — 99214 OFFICE O/P EST MOD 30 MIN: CPT | Performed by: INTERNAL MEDICINE

## 2022-04-26 NOTE — PROGRESS NOTES
Advanced Heart Failure Outpatient Progress Note Yoko Rene 68 y o  male MRN: [id number]    Encounter: [id number]      Assessment/Plan:    Patient Active Problem List    Diagnosis Date Noted    PAD (peripheral artery disease) ([place] [address] ) 07/19/2021    Lower extremity pain 07/11/2021    Urinary retention 07/11/2021    CAD (coronary artery disease) 07/10/2021    Fever 07/10/2021    Hypotension 07/10/2021    Ischemic leg pain 07/07/2021    Prostate cancer ([place] [address] ) 07/07/2021    Chronic midline low back pain 07/01/2021    Ischemic cardiomyopathy 07/01/2021    Chronic combined systolic and diastolic CHF (congestive heart failure) ([place] [address] ) 06/22/2021    S/P AVR 06/22/2021    Anemia 06/22/2021    Malignant neoplasm metastatic to bone ([place] [address] ) 02/28/2022    Embolism and 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 ([place] [address] ) 02/28/2022    Depression, recurrent ([place] [address] ) 02/28/2022    Weight loss 02/28/2022    Muscular deconditioning 02/28/2022    Port-A-Cath in place 10/05/2021          # Chronic HFrEF; LVEF 20%; LVIDd 5 9 cm; NYHA III; ACC/AHA Stage C  Etiology: ischemic +/- progression of valvular disease +/- chemotherapy (on abiraterone and leuprolide with unclear safety profile)    Euvolemic, warm and and well perfused on exam    Diagnostic:    TTE 06/14/2021 (at [facility] pre- and post-TAVR, per report): LVEF 20%  LVIDd 5 4 cm  "entire apex, mid and apical anterior septum, mid and apical inferior septum, mid and apical inferior wall, mid inferolateral wall, and mid lateral wall are akinetic " Trace MR and TR  Pre-TAVR: LVOT VTI 12 0 cm  JOSE 0 47 cm^2  Post: bioprosthetic AV valve present; JOSE 2 41 cm^2                  TTE 06/23/2021:   LVEF 20%  LVIDd 5 9 cm  Grade 2 DD  aneurysmal LV mid to apex "dyskinesis and aneurysmal deformity of the apical anterior, mid anteroseptal, apical inferior, apical septal, and apical wall(s) "   Normal RV size and RVSF  Mild MR  AV bioprosthesis with normal leaflet separation   Trace TR      TTE 11/17/21:  LVEF: 25%  LVIDd: 6 1cm  RV: normal size and systolic function  MR: mild to moderate   Other: Akinesis and aneurysmal deformity of the mid septum and apex  Grade 2 diastology  Ascending aorta 4cm  TAVR bioprosthetic valve, normally functioning  Mean gradient 3mmHg     Neurohormonal Blockade:  --Beta-Blocker: Metoprolol succinate 25 mg daily  --ACEi, ARB or ARNi: Entresto 24/26 mg BID - switched to losartan 25mg daily due to low blood pressure    (or SVR reduction)  --Aldosterone Receptor Blocker: Spironolactone 12 5 mg daily - off due to low BP  --SGLT2-I:   --Diuretic: Lasix 40 mg daily - decreased to 20mg daily due to lightheadedness     Sudden Cardiac Death Risk Reduction:  --ICD: s/p SJM BIV ICD 12/23/21  Interrogation 1/11/22: AP 17% BVP 96% DDDR 60  No significant high rate episodes  Normal device function     Electrical Resynchronization:  -- LBBB QRSd 158 msec preBIV; QRSd 148msec post BIV     Advanced Therapies (If appropriate): --We will continue to monitor     Prostate cancer with bony metastases  Receiving chemotherapy through the [place]  Taking SQ leuprolide (Eligard) and abiraterone (Zytiga) - safety of abiraterone in patients with LVEF <50% not established per FDA  [employer]-analysis of drug noted increased incidence and relative risk of CV toxicity      Coronary artery disease  Without active chest pain  S/pstenting to circumflex and RCA in 2001  [facility] 06/09/2021 (per documentation):"diffuse moderate prox and mid disease/ISR, severe OM lesion and total mid LAD "  Rx: aspirin, plavix, statin     Aortic stenosis  S/p TAVR (Evolut Pro+ 29mm) on 06/14/2021 at [facility]  Normally functioning on last echo 11/2021    # RLE ischemia  LEAD with >75% stenosis of the CFA/SFA  s/p right femoral endarterectomy 7/9/21  Continues on ASA, Plavix, and statin  Follows with Dr Jalyn Whiting      Hyperlipidemia     History of atrial tachycardia: s/p ablation at OSH in 2018    Benign prostatic hyperplasia      TODAY'S PLAN:  Decrease losartan to 12 5mg once a day  Continue current furosemide and metoprolol dose  2g sodium diet  2L fluid restriction  Physical activities/exercise as tolerated  Daily weights    HPI:   Petty Franks is a 70-year-old man with a PMH as aboe who presents for hospital follow-up      Presented to [facility] hospital on 06/03/2021 with chest pressure before chemotherapy treatment for prostate cancer  Sent for testing and noted to be in acute CHF exacerbation and was diuresed  Was then transferred to [facility] in [place]  TTE showed "severely reduced LVEF with LV apical aneurysm and septal/AW AK, RV normal, Vmax at 3 5 (however, it was read as moderate AS and preserved EF in 2019 so likely this is progression of AS in addition to interim AWMI) "  LHC also done which revealed "diffuse moderate prox and mid disease/ISR, severe OM lesion and total mid LAD "     Transferred to [facility] on 06/13 for TAVR  TAVR completed on 06/14, and patient was discharged home on 06/15/2021  Started on metoprolol succinate this admission (tartrate discontinued)       Admitted to [facility] from 06/22 to 06/25/2021 after presenting with worsening SOB and LE edema for 3 days  WAS without discharge medications from [facility] admission for about 1 week (including Lasix)  NT-proBNP 5157  Cardiology was consulted, TTE was completed, and IV diuresis was started  Reduced LVEF was confirmed and aneurysmal wall motion of apical segments  Transitioned to PO diuretics on day of discharge  Started on Entresto and fit with LifeVest  Lost 10 lbs this admission       07/06/2021: Patient presents for hospital follow-up  Primary complaint today is of dizziness and right LE pain/numbeness and right foot wound  Reports decreased appetite and increased frustration with attempting to adhere to low sodium diet  Did receive "Living With Heart Failure" booklet during admission   Prior to admissions, often would eat canned pastas for his meals  Considering getting Meals of Wheels  Unclear if patient has been taking both tartrate and succinate since discharge  Not wearing LifeVest; dislikes wearing it and left device at home today  Has not been completing daily weights; does not have scale at home  Patient organizes his own meds; VNA in home 2 times weekly  7/16/2021: Patient is here for follow-up  Patient hospitalized 6/17/20July 7-13 due to right lower extremity ischemia  Underwent right femoral endarterectomy on June 9  Furosemide Entresto held prior to surgery due to low blood pressure  Eventually discharged on low-dose losartan 12 5 mg daily due to soft blood pressure  Furosemide 40 mg daily resumed  Patient reports right lower extremity pain and difficulty bearing weight on the right leg  Otherwise denies shortness of breath, chest pain or lightheadedness  9/15/21: Here for follow up  Overall doing good  Mainly complaining of residual numbness/tingling on right leg  No shortness of breath, chest pain, palpitations or lightheadedness  1/25/22: Here for follow up  S/p BIV ICD placement 12/23/21  Reports lightheadedness "all the time"  No feeling faint  Spinning sensation when laying down  Mainly limited by leg pain, not doing much walking  Denied shortness of breath walking from the parking lot to the office and from the waiting area to exam room  Interval History:  4/26/22: Here for follow up  Decreased furosemide to 20mg daily on last visit  4/18/22 Na 141 K 3 9 creatinine 0 82  One episode of vertigo about a week ago  Still with intermittent episodes of lightheadedness  Doing PT twice a week  Still having lower extremity nerve pains      Review of Systems   Constitutional: Negative for chills and fever  HENT: Negative for ear pain and sore throat  Eyes: Negative for pain and visual disturbance  Respiratory: Negative for cough, chest tightness and shortness of breath      Cardiovascular: Negative for chest pain, palpitations and leg swelling  Gastrointestinal: Negative for abdominal distention, abdominal pain and vomiting  Genitourinary: Negative for dysuria and hematuria  Musculoskeletal: Negative for arthralgias and back pain  Skin: Negative for color change and rash  Neurological: Negative for seizures and syncope  All other systems reviewed and are negative  Past Medical History:   Diagnosis Date    Cancer [facility]) 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    Coronary artery disease 2001    S/p stenting to circumflex and RCA    HFrEF (heart failure with reduced ejection fraction) ([facility] [address] ) 06/14/2021    High cholesterol     Prostate cancer (HCC)          No Known Allergies        Current Outpatient Medications:     acetaminophen (TYLENOL) 325 mg tablet, Take 3 tablets (975 mg total) by mouth every 8 (eight) hours, Disp: 90 tablet, Rfl: 0    aspirin 81 mg chewable tablet, Chew 1 tablet (81 mg total) daily, Disp: 30 tablet, Rfl: 0    atorvastatin (LIPITOR) 80 mg tablet, TAKE 1 TABLET BY MOUTH DAILY WITH DINNER, Disp: 90 tablet, Rfl: 1    cetirizine (ZyrTEC) 5 MG tablet, Take 1 tablet (5 mg total) by mouth daily, Disp: 30 tablet, Rfl: 3    clopidogrel (PLAVIX) 75 mg tablet, TAKE 1 TABLET BY MOUTH EVERY DAY, Disp: 90 tablet, Rfl: 1    famotidine (PEPCID) 20 mg tablet, TAKE 1 TABLET BY MOUTH EVERY DAY, Disp: 30 tablet, Rfl: 0    abiraterone (ZYTIGA) 250 mg tablet, Take 4 tablets (1,000 mg total) by mouth daily (Patient not taking: Reported on 9/16/2021), Disp: 30 tablet, Rfl: 2    ferrous sulfate 325 (65 Fe) mg tablet, TAKE 1 TABLET BY MOUTH TWICE A DAY WITH MEALS, Disp: 60 tablet, Rfl: 1    furosemide (LASIX) 20 mg tablet, TAKE 1 TABLET BY MOUTH EVERY DAY, Disp: 90 tablet, Rfl: 1    gabapentin (NEURONTIN) 100 mg capsule, TAKE 3 CAPSULES BY MOUTH 3 TIMES A DAY, Disp: 180 capsule, Rfl: 0    leuprolide (Eligard) 22 5 mg, 22 5MG/1 SYRINGE UNDER THE SKIN ONE TIME AS DIRECTED EVERY 3 MONTHS, Disp: , Rfl:     lidocaine (LIDODERM) 5 %, Apply 2 patches topically daily Remove & Discard patch within 12 hours or as directed by MD, Disp: 15 patch, Rfl: 0    loperamide (IMODIUM) 2 mg capsule, Take 1 capsule (2 mg total) by mouth 4 (four) times a day as needed for diarrhea, Disp: 30 capsule, Rfl: 2    losartan (COZAAR) 25 mg tablet, TAKE 1 TABLET BY MOUTH EVERY DAY, Disp: 90 tablet, Rfl: 1    methocarbamol (ROBAXIN) 500 mg tablet, TAKE 1 TABLET BY MOUTH EVERY 6 HOURS , Disp: 120 tablet, Rfl: 0    metoprolol succinate (TOPROL-XL) 25 mg 24 hr tablet, TAKE 1 TABLET BY MOUTH EVERY DAY, Disp: 90 tablet, Rfl: 2    oxyCODONE (ROXICODONE) 10 MG TABS, Take 1 tablet (10 mg total) by mouth 3 (three) times a day as needed (cancer pain) Max Daily Amount: 30 mg, Disp: 90 tablet, Rfl: 0    prochlorperazine (COMPAZINE) 10 mg tablet, Take 1 tablet (10 mg total) by mouth every 6 (six) hours as needed for nausea or vomiting, Disp: 45 tablet, Rfl: 3    Social History     Socioeconomic History    Marital status:      Spouse name: Not on file    Number of children: 3    Years of education: Not on file    Highest education level: Not on file   Occupational History    Not on file   Tobacco Use    Smoking status: Former Smoker     Years: 20 00     Quit date: 2002     Years since quittin 8    Smokeless tobacco: Never Used   Vaping Use    Vaping Use: Never used   Substance and Sexual Activity    Alcohol use: Not Currently    Drug use: Not on file    Sexual activity: Not on file   Other Topics Concern    Not on file   Social History Narrative    Not on file     Social Determinants of Health     Financial Resource Strain: Not on file   Food Insecurity: Not on file   Transportation Needs: Not on file   Physical Activity: Not on file   Stress: Not on file   Social Connections: Not on file   Intimate Partner Violence: Not on file   Housing Stability: Not on file       History reviewed  No pertinent family history      Physical Exam:    Vitals:   Vitals:    04/26/22 1345   BP: 98/72   Pulse: 79   SpO2: 93%       Physical Exam  Constitutional:       General: He is not in acute distress  Appearance: Normal appearance  HENT:      Head: Normocephalic and atraumatic  Mouth/Throat:      Mouth: Mucous membranes are moist    Eyes:      General: No scleral icterus  Extraocular Movements: Extraocular movements intact  Cardiovascular:      Rate and Rhythm: Normal rate and regular rhythm  Heart sounds: S1 normal and S2 normal  No murmur heard  No friction rub  No gallop  Comments: Nonelevated JVP  Trace pitting edema bilaterally  Pulmonary:      Breath sounds: Normal breath sounds  Abdominal:      General: There is no distension  Palpations: Abdomen is soft  Tenderness: There is no abdominal tenderness  There is no guarding or rebound  Musculoskeletal:         General: Normal range of motion  Cervical back: Neck supple  Skin:     General: Skin is warm and dry  Capillary Refill: Capillary refill takes less than 2 seconds  Neurological:      General: No focal deficit present  Mental Status: He is alert and oriented to person, place, and time     Psychiatric:         Mood and Affect: Mood normal          Labs & Results:    Lab Results   Component Value Date    SODIUM 141 04/18/2022    K 3 9 04/18/2022     04/18/2022    CO2 23 04/18/2022    BUN 15 04/18/2022    CREATININE 0 82 04/18/2022    GLUC 89 04/18/2022    CALCIUM 8 7 04/18/2022     Lab Results   Component Value Date    WBC 4 65 04/18/2022    HGB 11 9 (L) 04/18/2022    HCT 36 8 04/18/2022    MCV 92 04/18/2022     04/18/2022     Lab Results   Component Value Date    NTBNP 4,279 (H) 02/14/2022      Lab Results   Component Value Date    CHOLESTEROL 146 09/30/2021     Lab Results   Component Value Date    HDL 35 (L) 09/30/2021     Lab Results   Component Value Date    TRIG 249 (H) 09/30/2021     No results found for: Jaime    EKG personally reviewed by Sade Pruitt MD      Counseling / Coordination of Care  Time spent today 25 minutes  Greater than 50% of total time was spent with the patient and / or family counseling and / or coordination of care  We went over current diagnosis, most recent studies and any changes in treatment  Thank you for the opportunity to participate in the care of this patient      Hernan Hawley MD  ADVANCED HEART FAILURE AND MECHANICAL CIRCULATORY SUPPORT  [facility]

## 2022-04-26 NOTE — PATIENT INSTRUCTIONS
Decrease losartan to 12 5mg once a day  Continue current furosemide and metoprolol dose  2g sodium diet  2L fluid restriction  Physical activities/exercise as tolerated  Daily weights

## 2022-04-29 ENCOUNTER — TELEPHONE (OUTPATIENT)
Dept: CARDIOLOGY CLINIC | Facility: CLINIC | Age: 77
End: 2022-04-29

## 2022-04-29 ENCOUNTER — OFFICE VISIT (OUTPATIENT)
Dept: PHYSICAL THERAPY | Facility: CLINIC | Age: 77
End: 2022-04-29
Payer: COMMERCIAL

## 2022-04-29 DIAGNOSIS — R53.81 PHYSICAL DECONDITIONING: Primary | ICD-10-CM

## 2022-04-29 DIAGNOSIS — R29.898 MUSCULAR DECONDITIONING: ICD-10-CM

## 2022-04-29 PROCEDURE — 97112 NEUROMUSCULAR REEDUCATION: CPT

## 2022-04-29 PROCEDURE — 97530 THERAPEUTIC ACTIVITIES: CPT

## 2022-04-29 NOTE — TELEPHONE ENCOUNTER
Received report patient had ATP x 1 for VT in VF zone on ICD  Shock aborted  Long short sequence prior to VT  This is the first tx he has had for VT since device implant  Recently saw Dr Lilly Abrams and was euvolemic  Cannot up titrate BB's due to hypotension which is chronic  Plan is to monitor his device every 2 weeks for next 2 months then monthly for 3 months after  If he has recurrent VT will tx with amiodarone

## 2022-04-29 NOTE — PROGRESS NOTES
Daily Note     Today's date: 2022  Patient name: Leonor Bryant  :   MRN: [id number]  Referring provider: Patricio Lawrence MD  Dx:   Encounter Diagnosis     ICD-10-CM    1  Physical deconditioning  R53 81    2  Muscular deconditioning  R29 898                   Subjective: Pt reports he has been doing well with no new complaints since LV  Objective: See treatment diary below      Assessment: Tolerated treatment well  Pt fatigues quickly with assigned interventions, but is able to recover with short rest breaks throughout treatment  Is appropriately challenged with current program   Patient would benefit from continued PT  Plan: Continue per plan of care        Goals: Patient's goal is To improve strength, walk, move and do things- ride motorcycle, see son referee    Precautions: prostate CA, R endarterectomy femoral, pacemaker, falls risk- poor sensation in R foot feels like there is a bar in his arch  Dx: balance deficits, LBP- postural deficits hip abd weakness          Daily Treatment Diary   Manuals    Hip flexor stretching 8' 8' 8'  8' 8            Therapeutic exercise         bridges x30 30x 30x  x20 x30   Seated lumbar flex         LP DL #90 pin 4 95# 20x 20x 95#  20x 95#  nv 20x   HR 20x 20x 20x  20x 20   S/l clamshells 2x10 20x 20x  20x 20x            Prone press up 2x10 2x10  2x10  2x10    Supine quad st         ltr         Bike 6'  6' 6'  6' 6 min    Neuro Re-ed         Hip abd isometric         NBOS EC 30"x2 nv 30"x2   2 x 20"    NBOS on foam 30"x2  30"x2   20" x 2    Foam marching 20  x20   15x            Ther Activity         Sit to stand 2x10 2x10 2x10  10x 2x10   sidesteps 5 laps  5 laps 5 laps      Stairs cane and rail     2 flights    Gait Training         Walking outside     18'    6mwt  1 min 18sec       Modalities

## 2022-05-02 DIAGNOSIS — D64.9 ANEMIA, UNSPECIFIED TYPE: ICD-10-CM

## 2022-05-03 RX ORDER — FERROUS SULFATE 325(65) MG
1 TABLET ORAL 2 TIMES DAILY WITH MEALS
Qty: 60 TABLET | Refills: 1 | Status: SHIPPED | OUTPATIENT
Start: 2022-05-03 | End: 2022-06-01

## 2022-05-03 NOTE — TELEPHONE ENCOUNTER
Royce Rene has requested a refill of ferrous sulfate 325 (65 Fe) mg tablet  Pt does not meet the requirements placed by [facility]  Would a refill be appropriate?

## 2022-05-04 ENCOUNTER — OFFICE VISIT (OUTPATIENT)
Dept: PHYSICAL THERAPY | Facility: CLINIC | Age: 77
End: 2022-05-04
Payer: COMMERCIAL

## 2022-05-04 DIAGNOSIS — R53.81 PHYSICAL DECONDITIONING: Primary | ICD-10-CM

## 2022-05-04 DIAGNOSIS — R29.898 MUSCULAR DECONDITIONING: ICD-10-CM

## 2022-05-04 DIAGNOSIS — R26.89 BALANCE PROBLEMS: ICD-10-CM

## 2022-05-04 PROCEDURE — 97110 THERAPEUTIC EXERCISES: CPT | Performed by: PHYSICAL THERAPIST

## 2022-05-04 PROCEDURE — 97140 MANUAL THERAPY 1/> REGIONS: CPT | Performed by: PHYSICAL THERAPIST
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
## 2022-05-05 DIAGNOSIS — I99.8 ISCHEMIC LEG PAIN: ICD-10-CM

## 2022-05-05 DIAGNOSIS — I50.22 CHRONIC HFREF (HEART FAILURE WITH REDUCED EJECTION FRACTION) (HCC): ICD-10-CM

## 2022-05-05 DIAGNOSIS — T78.40XD ALLERGY, SUBSEQUENT ENCOUNTER: ICD-10-CM

## 2022-05-05 DIAGNOSIS — M79.606 ISCHEMIC LEG PAIN: ICD-10-CM

## 2022-05-05 RX ORDER — METHOCARBAMOL 500 MG/1
TABLET, FILM COATED ORAL
Qty: 90 TABLET | Refills: 0 | Status: SHIPPED | OUTPATIENT
Start: 2022-05-05 | End: 2022-06-21

## 2022-05-05 RX ORDER — CETIRIZINE HYDROCHLORIDE 5 MG/1
5 TABLET ORAL DAILY
Qty: 90 TABLET | Refills: 1 | Status: SHIPPED | OUTPATIENT
Start: 2022-05-05 | End: 2022-08-01

## 2022-05-05 RX ORDER — LOSARTAN POTASSIUM 25 MG/1
12.5 TABLET ORAL DAILY
Qty: 45 TABLET | Refills: 1 | Status: SHIPPED | OUTPATIENT
Start: 2022-05-05

## 2022-05-06 ENCOUNTER — OFFICE VISIT (OUTPATIENT)
Dept: PHYSICAL THERAPY | Facility: CLINIC | Age: 77
End: 2022-05-06
Payer: COMMERCIAL

## 2022-05-06 DIAGNOSIS — R29.898 MUSCULAR DECONDITIONING: ICD-10-CM

## 2022-05-06 DIAGNOSIS — R53.81 PHYSICAL DECONDITIONING: Primary | ICD-10-CM

## 2022-05-06 PROCEDURE — 97140 MANUAL THERAPY 1/> REGIONS: CPT | Performed by: PHYSICAL THERAPIST

## 2022-05-06 PROCEDURE — 97110 THERAPEUTIC EXERCISES: CPT | Performed by: PHYSICAL THERAPIST

## 2022-05-06 PROCEDURE — 97112 NEUROMUSCULAR REEDUCATION: CPT | Performed by: PHYSICAL THERAPIST

## 2022-05-06 NOTE — PROGRESS NOTES
Daily Note     Today's date: 2022  Patient name: Nathaniel Monzon  :   MRN: [id number]  Referring provider: Maria G Boss MD  Dx: No diagnosis found  Subjective: He states he is feeling better after doing stretches at the end of last session and feels perhaps he could put on his shoes independently  Objective: See treatment diary below  5XStS: 16 88 sec  ALVARO over table 10 reps back felt good no better  FISitting pain in R leg post thigh no worse  Assessment: Tolerated treatment fair  He seems to be most limited by hip mobility so will trial mobilizations at nv  Patient would benefit from continued PT      Plan: Continue per plan of care        Goals: Patient's goal is To improve strength, walk, move and do things- ride motorcycle, see son referee    Precautions: prostate CA, R endarterectomy femoral, pacemaker, falls risk- poor sensation in R foot feels like there is a bar in his arch  Dx: balance deficits, LBP- postural deficits hip abd weakness    Assess this nv:   DGI:15/24- nt    Daily Treatment Diary   Manuals    Hip flexor stretching 8' 8' 8' 5'  8   Hip PROM    nv 10'    Therapeutic exercise         bridges x30 30x 30x 30x x20 x30   Seated lumbar flex     10x :05    LP DL #90 pin 4 95# 20x 20x 95#  20x 95#   20x   HR 20x 20x 20x  20x 20   S/l clamshells 2x10 20x 20x 20x 20x 20x   HS strap st    4x :20 4x "20    Prone press up 2x10 2x10  2x10 Standing lumbar ext over table 10x     Gastroc slant bd st    3x :20 3x :20    ltr         Bike 6'  6' 6'  8' 6 min    Neuro Re-ed         Hip abd isometric    nv 20x :05             NBOS on foam 30"x2  30"x2   20" x 2    Foam marching 20  x20   15x            Ther Activity         Sit to stand 2x10 2x10 2x10   2x10   sidesteps 5 laps  5 laps 5 laps 5 laps     Stairs cane and rail         Gait Training         Walking outside         6mwt  1 min 18sec  1 min 10sec 1 min 6sec    Modalities

## 2022-05-09 DIAGNOSIS — K59.1 FUNCTIONAL DIARRHEA: ICD-10-CM

## 2022-05-09 DIAGNOSIS — Z51.5 PALLIATIVE CARE PATIENT: ICD-10-CM

## 2022-05-09 DIAGNOSIS — C61 PROSTATE CANCER (HCC): ICD-10-CM

## 2022-05-09 DIAGNOSIS — G89.3 CANCER RELATED PAIN: ICD-10-CM

## 2022-05-09 NOTE — TELEPHONE ENCOUNTER
Primary palliative medicine provider:  [place]    Medication requested:  Oxycodone  10 mg     If for pain, how has the patient been taking their pain medicine?      Last appointment:  3 29    Next scheduled appointment:  5 12    PDMP review:    Last filled on 3 29  90/30

## 2022-05-10 ENCOUNTER — TELEPHONE (OUTPATIENT)
Dept: HEMATOLOGY ONCOLOGY | Facility: CLINIC | Age: 77
End: 2022-05-10

## 2022-05-10 DIAGNOSIS — C79.51 MALIGNANT NEOPLASM METASTATIC TO BONE (HCC): ICD-10-CM

## 2022-05-10 DIAGNOSIS — C61 PROSTATE CANCER (HCC): Primary | ICD-10-CM

## 2022-05-10 RX ORDER — OXYCODONE HYDROCHLORIDE 10 MG/1
10 TABLET ORAL 3 TIMES DAILY PRN
Qty: 90 TABLET | Refills: 0 | Status: SHIPPED | OUTPATIENT
Start: 2022-05-10 | End: 2022-06-07 | Stop reason: SDUPTHER

## 2022-05-10 RX ORDER — LOPERAMIDE HYDROCHLORIDE 2 MG/1
2 CAPSULE ORAL 4 TIMES DAILY PRN
Qty: 30 CAPSULE | Refills: 2 | Status: SHIPPED | OUTPATIENT
Start: 2022-05-10

## 2022-05-10 NOTE — TELEPHONE ENCOUNTER
05/10/22    Spoke with patient reminding updated labs prior to appt  I will repeat CBC/ CMP/ PSA    Advising pt to go to any [address] [place] [address] [facility] to get blood work done  Pt stated he will get labs done tomorrow

## 2022-05-10 NOTE — TELEPHONE ENCOUNTER
Royce Rene has requested a refill of loperamide (IMODIUM) 2 mg capsule  Pt meets the requirements placed by [facility]  Will refill medication

## 2022-05-11 ENCOUNTER — OFFICE VISIT (OUTPATIENT)
Dept: PHYSICAL THERAPY | Facility: CLINIC | Age: 77
End: 2022-05-11
Payer: COMMERCIAL

## 2022-05-11 ENCOUNTER — APPOINTMENT (OUTPATIENT)
Dept: LAB | Facility: CLINIC | Age: 77
End: 2022-05-11
Payer: COMMERCIAL

## 2022-05-11 DIAGNOSIS — R26.89 BALANCE PROBLEMS: ICD-10-CM

## 2022-05-11 DIAGNOSIS — C61 PROSTATE CANCER (HCC): ICD-10-CM

## 2022-05-11 DIAGNOSIS — C79.51 MALIGNANT NEOPLASM METASTATIC TO BONE (HCC): ICD-10-CM

## 2022-05-11 DIAGNOSIS — R53.81 PHYSICAL DECONDITIONING: Primary | ICD-10-CM

## 2022-05-11 LAB
ALBUMIN SERPL BCP-MCNC: 3.9 G/DL (ref 3.5–5)
ALP SERPL-CCNC: 60 U/L (ref 34–104)
ALT SERPL W P-5'-P-CCNC: 13 U/L (ref 7–52)
ANION GAP SERPL CALCULATED.3IONS-SCNC: 11 MMOL/L (ref 4–13)
AST SERPL W P-5'-P-CCNC: 15 U/L (ref 13–39)
BASOPHILS # BLD AUTO: 0.03 THOUSANDS/ΜL (ref 0–0.1)
BASOPHILS NFR BLD AUTO: 1 % (ref 0–1)
BILIRUB SERPL-MCNC: 0.72 MG/DL (ref 0.2–1)
BUN SERPL-MCNC: 11 MG/DL (ref 5–25)
CALCIUM SERPL-MCNC: 9.3 MG/DL (ref 8.4–10.2)
CHLORIDE SERPL-SCNC: 111 MMOL/L (ref 96–108)
CO2 SERPL-SCNC: 23 MMOL/L (ref 21–32)
CREAT SERPL-MCNC: 0.74 MG/DL (ref 0.6–1.3)
EOSINOPHIL # BLD AUTO: 0.07 THOUSAND/ΜL (ref 0–0.61)
EOSINOPHIL NFR BLD AUTO: 2 % (ref 0–6)
ERYTHROCYTE [DISTWIDTH] IN BLOOD BY AUTOMATED COUNT: 13.8 % (ref 11.6–15.1)
GFR SERPL CREATININE-BSD FRML MDRD: 89 ML/MIN/1.73SQ M
GLUCOSE SERPL-MCNC: 113 MG/DL (ref 65–140)
HCT VFR BLD AUTO: 35 % (ref 36.5–49.3)
HGB BLD-MCNC: 11.4 G/DL (ref 12–17)
IMM GRANULOCYTES # BLD AUTO: 0.05 THOUSAND/UL (ref 0–0.2)
IMM GRANULOCYTES NFR BLD AUTO: 1 % (ref 0–2)
LYMPHOCYTES # BLD AUTO: 0.64 THOUSANDS/ΜL (ref 0.6–4.47)
LYMPHOCYTES NFR BLD AUTO: 15 % (ref 14–44)
MCH RBC QN AUTO: 30.3 PG (ref 26.8–34.3)
MCHC RBC AUTO-ENTMCNC: 32.6 G/DL (ref 31.4–37.4)
MCV RBC AUTO: 93 FL (ref 82–98)
MONOCYTES # BLD AUTO: 0.45 THOUSAND/ΜL (ref 0.17–1.22)
MONOCYTES NFR BLD AUTO: 11 % (ref 4–12)
NEUTROPHILS # BLD AUTO: 3 THOUSANDS/ΜL (ref 1.85–7.62)
NEUTS SEG NFR BLD AUTO: 70 % (ref 43–75)
NRBC BLD AUTO-RTO: 0 /100 WBCS
PLATELET # BLD AUTO: 200 THOUSANDS/UL (ref 149–390)
PMV BLD AUTO: 9.7 FL (ref 8.9–12.7)
POTASSIUM SERPL-SCNC: 4.3 MMOL/L (ref 3.5–5.3)
PROT SERPL-MCNC: 6.1 G/DL (ref 6.4–8.4)
PSA SERPL-MCNC: 32.5 NG/ML (ref 0–4)
RBC # BLD AUTO: 3.76 MILLION/UL (ref 3.88–5.62)
SODIUM SERPL-SCNC: 145 MMOL/L (ref 135–147)
WBC # BLD AUTO: 4.24 THOUSAND/UL (ref 4.31–10.16)

## 2022-05-11 PROCEDURE — 97110 THERAPEUTIC EXERCISES: CPT | Performed by: PHYSICAL THERAPIST

## 2022-05-11 PROCEDURE — 97112 NEUROMUSCULAR REEDUCATION: CPT | Performed by: PHYSICAL THERAPIST

## 2022-05-11 PROCEDURE — 85025 COMPLETE CBC W/AUTO DIFF WBC: CPT

## 2022-05-11 PROCEDURE — 97140 MANUAL THERAPY 1/> REGIONS: CPT | Performed by: PHYSICAL THERAPIST

## 2022-05-11 PROCEDURE — 84153 ASSAY OF PSA TOTAL: CPT

## 2022-05-11 PROCEDURE — 36415 COLL VENOUS BLD VENIPUNCTURE: CPT

## 2022-05-11 PROCEDURE — 80053 COMPREHEN METABOLIC PANEL: CPT

## 2022-05-11 NOTE — PROGRESS NOTES
Daily Note     Today's date: 2022  Patient name: Bob Damico  :   MRN: [id number]  Referring provider: Greig Cabot, MD  Dx:   Encounter Diagnosis     ICD-10-CM    1  Physical deconditioning  R53 81    2  Balance problems  R26 89                   Subjective: He states he is tired today as he will be doing a job today and has walked up and down stairs and driven around town  Objective: See treatment diary below    Assessment: Tolerated treatment fair  He was given a shortened session today due to overall soreness  Patient would benefit from continued PT      Plan: Continue per plan of care        Goals: Patient's goal is To improve strength, walk, move and do things- ride motorcycle, see son referee    Precautions: prostate CA, R endarterectomy femoral, pacemaker, falls risk- poor sensation in R foot feels like there is a bar in his arch  Dx: balance deficits, LBP- postural deficits hip abd weakness    Assess this nv:   DGI:15/24- nt    Daily Treatment Diary   Manuals    Hip flexor stretching B 8' 8' 8' 5'  8   Hip PROM R    nv 10' 10' hip mobs nv   Therapeutic exercise         bridges x30 30x 30x 30x x20 x30   Seated lumbar flex     10x :05 10x :05   LP DL #90 pin 4 95# 20x 20x 95#  20x 95#      HR 20x 20x 20x  20x 20   S/l clamshells 2x10 20x 20x 20x 20x 20x   HS strap st    4x :20 4x "20 4x :20   Prone press up 2x10 2x10  2x10 Standing lumbar ext over table 10x     Gastroc slant bd st    3x :20 3x :20 3x :20   ltr         Bike 6'  6' 6'  8' 6 min    Neuro Re-ed         Hip abd isometric    nv 20x :05 20x :05            NBOS on foam 30"x2  30"x2      Foam marching 20  x20               Ther Activity         Sit to stand 2x10 2x10 2x10      sidesteps 5 laps  5 laps 5 laps 5 laps  5 laps   Stairs cane and rail         Gait Training         Walking outside         6mwt  1 min 18sec  1 min 10sec 1 min 6sec    Modalities

## 2022-05-12 ENCOUNTER — DOCUMENTATION (OUTPATIENT)
Dept: HEMATOLOGY ONCOLOGY | Facility: CLINIC | Age: 77
End: 2022-05-12

## 2022-05-12 ENCOUNTER — OFFICE VISIT (OUTPATIENT)
Dept: PALLIATIVE MEDICINE | Facility: CLINIC | Age: 77
End: 2022-05-12
Payer: COMMERCIAL

## 2022-05-12 ENCOUNTER — OFFICE VISIT (OUTPATIENT)
Dept: HEMATOLOGY ONCOLOGY | Facility: CLINIC | Age: 77
End: 2022-05-12
Payer: COMMERCIAL

## 2022-05-12 VITALS
OXYGEN SATURATION: 99 % | HEART RATE: 85 BPM | HEIGHT: 67 IN | DIASTOLIC BLOOD PRESSURE: 64 MMHG | WEIGHT: 194 LBS | TEMPERATURE: 98 F | BODY MASS INDEX: 30.45 KG/M2 | SYSTOLIC BLOOD PRESSURE: 102 MMHG | RESPIRATION RATE: 14 BRPM

## 2022-05-12 VITALS
BODY MASS INDEX: 30.32 KG/M2 | SYSTOLIC BLOOD PRESSURE: 100 MMHG | WEIGHT: 193.56 LBS | RESPIRATION RATE: 14 BRPM | DIASTOLIC BLOOD PRESSURE: 64 MMHG | HEART RATE: 71 BPM | OXYGEN SATURATION: 99 % | TEMPERATURE: 96.9 F

## 2022-05-12 DIAGNOSIS — C79.51 MALIGNANT NEOPLASM METASTATIC TO BONE (HCC): Primary | ICD-10-CM

## 2022-05-12 DIAGNOSIS — C61 PROSTATE CANCER (HCC): Primary | ICD-10-CM

## 2022-05-12 DIAGNOSIS — C61 PROSTATE CANCER (HCC): ICD-10-CM

## 2022-05-12 DIAGNOSIS — Z51.5 PALLIATIVE CARE PATIENT: Chronic | ICD-10-CM

## 2022-05-12 DIAGNOSIS — G89.3 CANCER-RELATED PAIN: Chronic | ICD-10-CM

## 2022-05-12 PROCEDURE — 99214 OFFICE O/P EST MOD 30 MIN: CPT | Performed by: INTERNAL MEDICINE

## 2022-05-12 PROCEDURE — 1160F RVW MEDS BY RX/DR IN RCRD: CPT | Performed by: INTERNAL MEDICINE

## 2022-05-12 PROCEDURE — 1036F TOBACCO NON-USER: CPT | Performed by: INTERNAL MEDICINE

## 2022-05-12 PROCEDURE — 99213 OFFICE O/P EST LOW 20 MIN: CPT | Performed by: FAMILY MEDICINE

## 2022-05-12 NOTE — PROGRESS NOTES
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 until approximately March of this year   Per the records PSA was slowly rising while on treatment    HPI:    Michelle Jarvis seen for initial consultation 10/5/2021 regarding  A history of metastatic castration resistant prostate cancer   The patient was diagnosed with a [place] 7 prostate cancer treated with external beam radiation from October of 2003 to December of 2003  Karthik Lu also got neoadjuvant and adjuvant [facility] for 9 months after radiation  Karthik Lu was observed until July of 2008 when he was restarted on Zoladex in later Lupron   PSA was going up and imaging showed new bony lesions at that time  Karthik Lu was started on are better own in March of 2015 and his scan in June of 2017 showed progression so was started on enzalutamide   His PSA continued to rise and he had progressive bony metastases including to the sacrum for which he received 3000 cGy of radiation in 10 fractions in February of 2019  He was then restarted on enzalutamide but a scan in December of 2019 showed worsening bone metastases so he was started on docetaxel in March of 2020  He was kept on this for approximately a year with his PSA slowly rising near the end per the notes   After March he states he was admitted to a hospital for cardiac issues and then never went back for either chemotherapy or his Lupron       Previous Hematologic/ Oncologic History:      As above  [place]    Current Hematologic/ Oncologic Treatment:      Lupron every 3 months    Interval History:      Patient returns for follow-up visit  He finished up his Xofigo  His PSA had gone down to 18 3 but is now 32 5  At this point since he has finished up his [place] we will reimage him in 3 months    From what ever records I do have it does not appear he has received Xtandi  He does not remember this  Regardless at this point I think it is reasonable to start  He will get blood work every month  Denies any nausea denies any vomiting  Overall he did reasonably well with the Xofigo  The rest of his 14 point review of systems today was negative  Test Results:    Imaging: NM xofigo infusion    Result Date: 4/21/2022  Narrative: Devin Kocher THERAPY Indication:   C61: Malignant neoplasm of prostate   Patient presents for therapy Radiopharmaceutical:  130 uCi radium [address]  S:  3/24/2022 FINDINGS: Patient is a 68year old male with a history of prostate cancer with bone metastases  Laboratory blood work was drawn on 4/18/2022 Hemoglobin 11 9  Platelet count was 053,109  Absolute neutrophils 2 87  The benefits, risks and alternatives of this therapy were explained  Written informed consent was obtained  The patient was also given written and oral instructions regarding posttherapy care  [place] was administered intravenously without incident  Impression: 130 uCi radium 36 Xofigo was administered intravenously without immediate complication  Workstation performed: [id number]     Cardiac EP device report    Result Date: 4/29/2022  Narrative: [facility] BI-V ICD -ACTIVE SYSTEM IS MRI CONDITIONAL NON-BILLABLE MERLIN TRANSMISSION: BATTERY VOLTAGE ADEQUATE (6 8 YRS)  AP: 17%  BP: 98%  ALL AVAILABLE LEAD PARAMETERS WITHIN NORMAL LIMITS  1 VT/VF EPISODE W/ EGM SHOWING VT @ 214 BPM, ATP X 1 SUCCESSFUL FOR VT, SHOCK X 1 ABORTED  PT TAKES PLAVIX, METOPROLOL SUCC, ASA 81MG  EF: 25% (ECHO 11/17/21)  TT DR Daniel Esparza & DR RICO IYER IMPEDANCE THRESHOLD CROSSED  X 7 DAYS  PT TAKES LASIX  TASK TO HF TEAM   UNABLE TO REACH PT VIA CELL PHONE, MAILBOX IS FULL  SPOKE TO DAUGHTER CM, SHE GAVE ME HER BROTHERS # ([phone]) TO CALL SINCE HE LIVES W/ PT  L/M ON [employer] VOICEMAIL  APPROPRIATELY FUNCTIONING BI-V ICD   [address]     Cardiac EP device report    Result Date: 4/12/2022  Narrative: [facility] BI-V ICD -ACTIVE SYSTEM IS MRI CONDITIONAL DEVICE INTERROGATED IN THE [place] OFFICE: BATTERY VOLTAGE ADEQUATE (6 7 YRS)  DEVICE SOFTWARE UPGRADE COMPLETE  AP 35% BVP 98% (DDDR 60); ALL LEAD PARAMETERS WITHIN NORMAL LIMITS  4 AMS EPISODE WITH PAT ON EGRM'S STORED (NO AF)  AMS BURDEN <0 1%  NO HIGH RATE EPISODES OR THERAPIES  EF 25% (11/17/2021 ECHO)  PATIENT ON ASA 81, CLOPIDOGREL, METOPROLOL SUCC  CORVUE IMPEDANCE MONITORING WITHIN NORMAL LIMITS  DECREASE MADE TO RA AMPLITUDE TO PROMOTE DEVICE LONGEVITY WHILE MAINTAINING AN APPROPRIATE SAFETY MARGIN  NORMAL DEVICE FUNCTION  ES       Labs:   Lab Results   Component Value Date    WBC 4 24 (L) 05/11/2022    HGB 11 4 (L) 05/11/2022    HCT 35 0 (L) 05/11/2022    MCV 93 05/11/2022     05/11/2022     Lab Results   Component Value Date    K 4 3 05/11/2022     (H) 05/11/2022    CO2 23 05/11/2022    BUN 11 05/11/2022    CREATININE 0 74 05/11/2022    GLUF 96 12/23/2021    CALCIUM 9 3 05/11/2022    CORRECTEDCA 9 2 06/23/2021    AST 15 05/11/2022    ALT 13 05/11/2022    ALKPHOS 60 05/11/2022    EGFR 89 05/11/2022       Lab Results   Component Value Date    PSA 32 5 (H) 05/11/2022    PSA 18 3 (H) 02/07/2022    PSA 21 0 (H) 01/25/2022     ROS: As stated in the history of present illness otherwise his 14 point review of systems today was negative        Active Problems:   Patient Active Problem List   Diagnosis    Chronic combined systolic and diastolic CHF (congestive heart failure) (HCC)    S/P AVR    Anemia    Chronic midline low back pain    Ischemic cardiomyopathy    Ischemic leg pain    Prostate cancer ([address] )    CAD (coronary artery disease)    Fever    Hypotension    Lower extremity pain    Urinary retention    PAD (peripheral artery disease) ([address] )    Port-A-Cath in place    Malignant neoplasm metastatic to bone ([address] )    Embolism and 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 ([address] )    Depression, recurrent ([place] [address] )    Weight loss    Muscular deconditioning       Past Medical History:   Past Medical History:   Diagnosis Date    Cancer ([place] [address] ) 2002    tailbone    Coronary artery disease 2001    S/p stenting to circumflex and RCA    HFrEF (heart failure with reduced ejection fraction) ([place] [address] ) 2021    High cholesterol     Prostate cancer [facility])        Surgical History:   Past Surgical History:   Procedure Laterality Date    CARDIAC ELECTROPHYSIOLOGY PROCEDURE N/A 2021    Procedure: Implant ICD - bi ventricular;  Surgeon: Dev Mendenhall MD;  [facility] CARD[facility]; Service: Cardiology    CARDIAC SURGERY      OK THROMBOENDARTECTMY FEMORAL COMMON Right 2021    Procedure: ENDARTERECTOMY ARTERIAL FEMORAL;  Surgeon: Zahida Santo DO;  Location: [facility];  Service: Vascular       Family History:  No family history on file  Cancer-related family history is not on file  Social History:   Social History     Socioeconomic History    Marital status:       Spouse name: Not on file    Number of children: 3    Years of education: Not on file    Highest education level: Not on file   Occupational History    Not on file   Tobacco Use    Smoking status: Former Smoker     Years: 20 00     Quit date: 2002     Years since quittin 8    Smokeless tobacco: Never Used   Vaping Use    Vaping Use: Never used   Substance and Sexual Activity    Alcohol use: Not Currently    Drug use: Not on file    Sexual activity: Not on file   Other Topics Concern    Not on file   Social History Narrative    Not on file     Social Determinants of Health     Financial Resource Strain: Not on file   Food Insecurity: Not on file   Transportation Needs: Not on file   Physical Activity: Not on file   Stress: Not on file   Social Connections: Not on file   Intimate Partner Violence: Not on file   Housing Stability: Not on file       Current Medications:   Current Outpatient Medications   Medication Sig Dispense Refill    acetaminophen (TYLENOL) 325 mg tablet Take 3 tablets (975 mg total) by mouth every 8 (eight) hours 90 tablet 0    aspirin 81 mg chewable tablet Chew 1 tablet (81 mg total) daily 30 tablet 0    atorvastatin (LIPITOR) 80 mg tablet TAKE 1 TABLET BY MOUTH DAILY WITH DINNER 90 tablet 1    cetirizine (ZyrTEC) 5 MG tablet TAKE 1 TABLET (5 MG TOTAL) BY MOUTH DAILY  90 tablet 1    clopidogrel (PLAVIX) 75 mg tablet TAKE 1 TABLET BY MOUTH EVERY DAY 90 tablet 1    famotidine (PEPCID) 20 mg tablet TAKE 1 TABLET BY MOUTH EVERY DAY 30 tablet 2    ferrous sulfate 325 (65 Fe) mg tablet Take 1 tablet (325 mg total) by mouth 2 (two) times a day with meals 60 tablet 1    furosemide (LASIX) 20 mg tablet TAKE 1 TABLET BY MOUTH EVERY DAY 90 tablet 1    gabapentin (NEURONTIN) 100 mg capsule TAKE 3 CAPSULES BY MOUTH 3 TIMES A  capsule 2    leuprolide (Eligard) 22 5 mg 22  5MG/1 SYRINGE UNDER THE SKIN ONE TIME AS DIRECTED EVERY 3 MONTHS      lidocaine (LIDODERM) 5 % Apply 2 patches topically daily Remove & Discard patch within 12 hours or as directed by MD 15 patch 0    loperamide (IMODIUM) 2 mg capsule Take 1 capsule (2 mg total) by mouth 4 (four) times a day as needed for diarrhea 30 capsule 2    losartan (COZAAR) 25 mg tablet Take 0 5 tablets (12 5 mg total) by mouth daily 45 tablet 1    methocarbamol (ROBAXIN) 500 mg tablet TAKE 1 TABLET BY MOUTH EVERY 6 HOURS 90 tablet 0    metoprolol succinate (TOPROL-XL) 25 mg 24 hr tablet TAKE 1 TABLET BY MOUTH EVERY DAY 90 tablet 2    oxyCODONE (ROXICODONE) 10 MG TABS Take 1 tablet (10 mg total) by mouth 3 (three) times a day as needed (cancer pain) Max Daily Amount: 30 mg 90 tablet 0    prochlorperazine (COMPAZINE) 10 mg tablet Take 1 tablet (10 mg total) by mouth every 6 (six) hours as needed for nausea or vomiting 45 tablet 3    abiraterone (ZYTIGA) 250 mg tablet Take 4 tablets (1,000 mg total) by mouth daily (Patient not taking: No sig reported) 30 tablet 2     No current facility-administered medications for this visit  Allergies: No Known Allergies    Physical Exam:    Body surface area is 2 meters squared  Wt Readings from Last 3 Encounters:   05/12/22 88 kg (194 lb)   04/26/22 87 7 kg (193 lb 6 4 oz)   03/29/22 85 8 kg (189 lb 2 5 oz)        Temp Readings from Last 3 Encounters:   05/12/22 98 °F (36 7 °C) (Temporal)   03/29/22 (!) 97 4 °F (36 3 °C) (Temporal)   03/01/22 (!) 95 8 °F (35 4 °C) (Temporal)        BP Readings from Last 3 Encounters:   05/12/22 102/64   04/26/22 98/72   04/25/22 112/70         Pulse Readings from Last 3 Encounters:   05/12/22 85   04/26/22 79   03/29/22 70         Physical Exam     Constitutional   General appearance: No acute distress, well appearing and well nourished  Eyes   Conjunctiva and lids: No swelling, erythema or discharge  Pupils and irises: Equal, round and reactive to light  Ears, Nose, Mouth, and Throat   External inspection of ears and nose: Normal     Nasal mucosa, septum, and turbinates: Normal without edema or erythema  Oropharynx: Normal with no erythema, edema, exudate or lesions  Pulmonary   Respiratory effort: No increased work of breathing or signs of respiratory distress  Auscultation of lungs: Clear to auscultation  Cardiovascular   Palpation of heart: Normal PMI, no thrills  Auscultation of heart: Normal rate and rhythm, normal S1 and S2, without murmurs  Examination of extremities for edema and/or varicosities: Normal     Carotid pulses: Normal     Abdomen   Abdomen: Non-tender, no masses  Liver and spleen: No hepatomegaly or splenomegaly  Lymphatic   Palpation of lymph nodes in neck: No lymphadenopathy  Musculoskeletal   Gait and station: Normal     Digits and nails: Normal without clubbing or cyanosis      Inspection/palpation of joints, bones, and muscles: Normal     Skin   Skin and subcutaneous tissue: Normal without rashes or lesions  Neurologic   Cranial nerves: Cranial nerves 2-12 intact  Sensation: No sensory loss  Psychiatric   Orientation to person, place, and time: Normal     Mood and affect: Normal         Assessment / Plan: This is an unfortunate 42-year-old male with a past medical history of [place] 7 prostate cancer with what appears to be predominantly bony metastatic disease who has been on docetaxel and Lupron recently both of which he states were stopped in March when he got admitted to an outside hospital for cardiac issues  Per the notes I do have it appeared he was progressing somewhat on docetaxel with a rising PSA  He has had radiation in the past to symptomatic bone metastases  we referred him for [facility] since he had progressed on docetaxel and Zytiga I will rechallenge him with Yoni Fanmia  There was documentation of some progression at the outside hospital but now that he has been treated with [facility] we will try to see if we can rechallenge him and have his PSA respond  If not another option would be BLU-PSMAn so I will order a PET-CT scan with PSMA to be done at his next visit  If he does not respond to Yoni Fang we will consider referring him for this therapy  The patient agrees  I will see him back in 3 months  He will get blood work every month  I will see him back in 3 months  Goals and Barriers:  Current Goal:  Prolong Survival from metastatic prostate cancer  Barriers: None  Patient's Capacity to Self Care:  Patient able to self care  Portions of the record may have been created with voice recognition software  Occasional wrong word or "sound a like" substitutions may have occurred due to the inherent limitations of voice recognition software  Read the chart carefully and recognize, using context, where substitutions have occurred

## 2022-05-12 NOTE — PROGRESS NOTES
Outpatient Follow-Up - Palliative and Supportive Care   Bobby Rene 68 y o  male [id number]    Assessment & Plan  Problem List Items Addressed This Visit    None         Medications adjusted this encounter:  Requested Prescriptions      No prescriptions requested or ordered in this encounter     No orders of the defined types were placed in this encounter  There are no discontinued medications  - Meds refilled x1 mo earlier this week  - Ongoing close f/up in clinic   - Will need to attempt to engage family again re: pt's function and orientation  = Until greater degree of overwatch and medicine familiarity, continue with VERY judicious opioid usage for severe cancer related pain  Gael Dominguez was seen today for symptoms and planning cares related to above illnesses  I have reviewed the patient's controlled substance dispensing history in the Prescription Drug Monitoring Program in compliance with the [facility] regulations before prescribing any controlled substances  They are invited to continue to follow with us  If there are questions or concerns, please contact us through our clinic/answering service 24 hours a day, seven days a week  Robyn Ag MD  [facility]  [phone]      Visit Information    Accompanied By: Family member    Source of History: Self, Family member    History Limitations: limited health literacy    Contacts: son Shaina Lipscomb -                Daughter Selene Noble - [phone]    History of Present Illness      Gael Dominguez is a 68 y o  male who presents in follow up of symptoms related to Stage IV prostate cancer, hormone treatment resistant  He follow with Dr Ryan Estrada and Ms Radha Venegas for this  There is also a significant cardiovascular history, with heart failure management by Dr Malathi Sutherland, and PAD with distal pain at rest in both feet    Pertinent issues include: symptom management, disease process education and discussion of prognosis      Since last visit, he is feeling better with back pain  PT has been helpful, and he is foregoing walker and cane  He is driving again, and 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  Son not with him today  Pt appears sharper today, and in less pain, abut continues to both endorse no understanding of his meds, and no willingness to use help from family on this matter  He has not seen PCP in quite some time, and has no desire to return unless very strongly indicated  From our first visit on 3/1:      "  Pt has demonstrated -- over a year ago now -- progression of his illness (rising PSA and decreasing functional status) despite aggressive therapies and cytotoxic treatments  His side effects vs decompensation in his overall illness was so severe he was admitted to [address] [place] [address]  in march 2021  Since that time, he has recovered some function with careful management of his cardiac disease, and less aggressive, less toxic cancer cares  At this time, his treatment plan involves Xofigo for bone-avid disease, +/- Lupron  We note that the patient has demonstrated some disease progression on other hormonal blockers, but that this plan of care is being offered, if not rec'd, by Dr Clakre Lagos  Pt presents to our office with suboptimally controlled pain, ongoing wt loss, poor appetite, and general malaise  His concerns are more related to his back and R foot pain; a Podiatrist today advised him that his unilateral neuropathic symptoms 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        Today, he can't quite recall his medications, he just insists that he needs 'something stronger than tynlenol'  When we pointed out that he has used tramadol and oxyIR recently, he also stated that these medications were not helpful  Pt endorses support from his son, who will drive pt to appts and on other errands  Son does not help with meds    Pt can't name his meds, but has some vague understanding of the indications for his meds "    Past medical, surgical, social, and family histories are reviewed and pertinent updates are made  Review of Systems   Unable to perform ROS: mental status change (limited insight and poor recall)         Vital Signs    /64 (BP Location: Left arm, Patient Position: Sitting, Cuff Size: Standard)   Pulse 71   Temp (!) 96 9 °F (36 1 °C) (Temporal)   Resp 14   Wt 87 8 kg (193 lb 9 oz)   SpO2 99%   BMI 30 32 kg/m²     Physical Exam and Objective Data  Physical Exam  Constitutional:       General: He is in acute distress (toddles to bathroom almost as soon as I enter encounter)  Appearance: He is ill-appearing  He is not toxic-appearing or diaphoretic  Comments: Frail   HENT:      Head: Normocephalic and atraumatic  Right Ear: External ear normal       Left Ear: External ear normal    Eyes:      General:         Right eye: No discharge  Left eye: No discharge  Conjunctiva/sclera: Conjunctivae normal       Pupils: Pupils are equal, round, and reactive to light  Neck:      Trachea: No tracheal deviation  Cardiovascular:      Rate and Rhythm: Regular rhythm  Tachycardia present  Pulmonary:      Effort: Pulmonary effort is normal  No respiratory distress  Breath sounds: No stridor  Abdominal:      Palpations: Abdomen is soft  Comments: scaphoid   Skin:     General: Skin is warm and dry  Coloration: Skin is pale  Findings: No erythema or rash  Comments: Poor turgor   Neurological:      General: No focal deficit present  Mental Status: Mental status is at baseline  He is disoriented  Cranial Nerves: No cranial nerve deficit     Psychiatric:      Comments: Judgment limited; insight poor           Radiology and Laboratory:  I personally reviewed and interpreted the following results: none new    30+ minutes was spent face to face with Royce Rene and his family with greater than 50% of the time spent in counseling or coordination of care including: chart review; symptom pursuit; supportive listening; anticipatory guidance  All of the patient's or agent's questions were answered during this discussion      This note was not shared with the patient due to privacy exception: note includes other individuals

## 2022-05-12 NOTE — PROGRESS NOTES
Received request from clinical for patient to start on Xtandi 120mg daily  Auth has been submitted via cover my meds and this is pending (Key: Lyle Records)    patient has Al Oconnor:       [id number]  PCN:      [id number]  ID:          [id number]    FUNDING HAS BEEN PROVIDED AS WELL     ID [id number]  CARD [id number]  PCN [id number]  [facility]# [id number]  BIN# [id number]  AMT $8000  EFF 9/21/21  THRU 9/22/22    UPDATE:  This has been approved and good until 11/13/2023  [employer] aware

## 2022-05-12 NOTE — PROGRESS NOTES
5-12-22  Rcvd new oral chemo start Pamela Anderson // Mae required    Patient has an active denise through [facility] ID [id number]  [facility] [id number]  PCN [id number]  GRP# [id number]  BIN# [id number]  AMT $8000  EFF 9/21/21  THRU 9/22/22    Epic noted

## 2022-05-13 ENCOUNTER — REMOTE DEVICE CLINIC VISIT (OUTPATIENT)
Dept: CARDIOLOGY CLINIC | Facility: CLINIC | Age: 77
End: 2022-05-13

## 2022-05-13 ENCOUNTER — APPOINTMENT (OUTPATIENT)
Dept: PHYSICAL THERAPY | Facility: CLINIC | Age: 77
End: 2022-05-13
Payer: COMMERCIAL

## 2022-05-13 DIAGNOSIS — C79.51 MALIGNANT NEOPLASM METASTATIC TO BONE (HCC): ICD-10-CM

## 2022-05-13 DIAGNOSIS — C61 PROSTATE CANCER (HCC): Primary | ICD-10-CM

## 2022-05-13 DIAGNOSIS — Z95.810 PRESENCE OF IMPLANTABLE CARDIOVERTER-DEFIBRILLATOR (ICD): Primary | ICD-10-CM

## 2022-05-13 PROCEDURE — RECHECK: Performed by: INTERNAL MEDICINE

## 2022-05-13 NOTE — PROGRESS NOTES
SJM BI-V ICD -ACTIVE SYSTEM IS MRI CONDITIONAL   NB MERLIN TRANSMISSION:  EPISODE CHECK:  BATTERY VOLTAGE ADEQUATE (6 7 YR )   AP 13% BP 98%   ALL LEAD PARAMETERS WITHIN NORMAL LIMITS   NO NEW HIGH RATE EPISODES   LISTED EPISODE PREVIOUSLY REPORTED   CORVUE IMPEDANCE MONITORING WITHIN NORMAL LIMITS   NORMAL DEVICE FUNCTION   RG

## 2022-05-15 PROBLEM — Z51.5 PALLIATIVE CARE PATIENT: Chronic | Status: ACTIVE | Noted: 2022-05-10

## 2022-05-15 PROBLEM — G89.3 CANCER-RELATED PAIN: Chronic | Status: ACTIVE | Noted: 2022-05-10

## 2022-05-18 ENCOUNTER — OFFICE VISIT (OUTPATIENT)
Dept: PHYSICAL THERAPY | Facility: CLINIC | Age: 77
End: 2022-05-18
Payer: COMMERCIAL

## 2022-05-18 DIAGNOSIS — R53.81 PHYSICAL DECONDITIONING: ICD-10-CM

## 2022-05-18 DIAGNOSIS — R29.898 MUSCULAR DECONDITIONING: ICD-10-CM

## 2022-05-18 DIAGNOSIS — R26.89 BALANCE PROBLEMS: Primary | ICD-10-CM

## 2022-05-18 PROCEDURE — 97112 NEUROMUSCULAR REEDUCATION: CPT | Performed by: PHYSICAL THERAPIST

## 2022-05-18 PROCEDURE — 97140 MANUAL THERAPY 1/> REGIONS: CPT | Performed by: PHYSICAL THERAPIST

## 2022-05-18 PROCEDURE — 97110 THERAPEUTIC EXERCISES: CPT | Performed by: PHYSICAL THERAPIST

## 2022-05-18 NOTE — PROGRESS NOTES
Daily Note     Today's date: 2022  Patient name: Kirstin Bingham  :   MRN: [id number]  Referring provider: Moe Rowell MD  Dx:   Encounter Diagnosis     ICD-10-CM    1  Balance problems  R26 89    2  Muscular deconditioning  R29 898    3  Physical deconditioning  R53 81                   Subjective: He states he is feeling pretty good today  Objective: See treatment diary below    Assessment: Tolerated treatment fair  After observation of many visits of hearing him speak we discussed his follow ups c Dental care to help eating and speaking and he states he does not have insurance but would like to get a visit as the last time he had a follow up was about 4 years ago  He was told that he would not qualify for false teeth and he would have to pay $8,000 to drill into his gums to fixate teeth  He does get assistance from [employer] wheels  He did still fatigue c walking today  Patient would benefit from continued PT      Plan: Continue per plan of care    Will RE nv       Goals: Patient's goal is To improve strength, walk, move and do things- ride motorcycle, see son referee    Precautions: prostate CA, R endarterectomy femoral, pacemaker, falls risk- poor sensation in R foot feels like there is a bar in his arch  Dx: balance deficits, LBP- postural deficits hip abd weakness    Assess this nv:   DGI:15/24- nt    Daily Treatment Diary   Manuals    Hip flexor stretching B  8' 8' 5'  8   Hip PROM R Lateral hip abd mobs   nv 10' 10' hip mobs nv   Therapeutic exercise         bridges x30 30x 30x 30x x20 x30   Seated lumbar flex     10x :05 10x :05   LP DL #90 pin 4 95# 20x 20x 95#  20x 95#      HR  20x 20x  20x 20   S/l clamshells 2x10 20x 20x 20x 20x 20x   HS strap st 3x :30   4x :20 4x "20 4x :20   Prone press up 2x10 2x10  2x10 Standing lumbar ext over table 10x     Gastroc slant bd st 3x :20   3x :20 3x :20 3x :20   ltr         Bike 6'  6' 6'  8' 6 min    Neuro Re-ed Hip abd isometric nv   nv 20x :05 20x :05            NBOS on foam   30"x2      Foam marching   x20               Ther Activity         Sit to stand 2x10 2x10 2x10      sidesteps 5 laps  5 laps 5 laps 5 laps  5 laps   Stairs cane and rail         Gait Training         Walking outside         6mwt 1 min 9 sec 1 min 18sec  1 min 10sec 1 min 6sec    Modalities

## 2022-05-19 ENCOUNTER — IN-CLINIC DEVICE VISIT (OUTPATIENT)
Dept: CARDIOLOGY CLINIC | Facility: CLINIC | Age: 77
End: 2022-05-19

## 2022-05-19 DIAGNOSIS — Z95.810 BIVENTRICULAR ICD (IMPLANTABLE CARDIOVERTER-DEFIBRILLATOR) IN PLACE: Primary | ICD-10-CM

## 2022-05-19 PROCEDURE — RECHECK

## 2022-05-19 NOTE — PROGRESS NOTES
Results for orders placed or performed in visit on 05/19/22   Cardiac EP device report    Narrative    [facility] BI-V ICD -Reid 428  N/B; DEVICE INTERROGATED IN THE [place] OFFICE - REPROGRAMMING LRL a/p DR BUCK; BATTERY VOLTAGE ADEQUATE (6 7 YRS)  AP 12% BVP 98% ; ALL LEAD PARAMETERS WITHIN NORMAL LIMITS  NO NEW HIGH RATE EPISODES  CORVUE IMPEDANCE MONITORING WITHIN NORMAL LIMITS  LRL INCREASED FROM 60 BPM TO 70 BPM  NO OTHER PROGRAMMING CHANGES MADE TO DEVICE PARAMETERS  APPROPRIATELY FUNCTIONING BI-V ICD       ES

## 2022-05-20 ENCOUNTER — OFFICE VISIT (OUTPATIENT)
Dept: PHYSICAL THERAPY | Facility: CLINIC | Age: 77
End: 2022-05-20
Payer: COMMERCIAL

## 2022-05-20 DIAGNOSIS — R26.89 BALANCE PROBLEMS: Primary | ICD-10-CM

## 2022-05-20 DIAGNOSIS — R53.81 PHYSICAL DECONDITIONING: ICD-10-CM

## 2022-05-20 DIAGNOSIS — R29.898 MUSCULAR DECONDITIONING: ICD-10-CM

## 2022-05-20 PROCEDURE — 97112 NEUROMUSCULAR REEDUCATION: CPT | Performed by: PHYSICAL THERAPIST

## 2022-05-20 PROCEDURE — 97110 THERAPEUTIC EXERCISES: CPT | Performed by: PHYSICAL THERAPIST

## 2022-05-20 PROCEDURE — 97140 MANUAL THERAPY 1/> REGIONS: CPT | Performed by: PHYSICAL THERAPIST

## 2022-05-20 NOTE — PROGRESS NOTES
Daily Note and Progress note    Today's date: 2022  Patient name: Caroline Sweeney  :   MRN: [id number]  Referring provider: Mani Walden MD  Dx:   Encounter Diagnosis     ICD-10-CM    1  Balance problems  R26 89    2  Muscular deconditioning  R29 898    3  Physical deconditioning  R53 81                   Subjective: He states he is sleeping well but still has trouble walking longer distances  His pain is not too bad  He is feeling good with his overall progress  Objective: See treatment diary below  SLB: RLE 1 hand 10 sec  LLE no finger 20 sec      5XStS: 10 75 sec    DGI:-   Observation:FHP    Observation:  Heel/toe walk:  Gait: R trendelenburg, forward leaning posture c cane  Squat: sit to stand good performance s UE support  Reflexes:NT  Sensation: Desensate to R great toe, decreased sensation to L heel  Stairs: step through pattern c rail  And cane  LAD: -  Slump: B HS tightness  R calf tightness  Lumbar AROM L R Strength L R   Flex  Min joy  Flex 5 4+   Ext Mod joy 10 reps  Ext 5 4+           Hip A/PROM        Flex 105 tight / 105 tight / Flex 4+ 4   ER at 90 50 45 ER     IR at 90 15 15 IR     Abd 23 20 Abd 3 3-   Ext   Ext             Knee A/PROM   Ankle     Flex   DF 5 5   Ext   PF             Assessment:    Royce Rene has demonstrated overall improvement in balance, sleep,  Walking tolerance and function  His gait speed is improved and he improved walking time from 1 min to 1 minute 45 seconds with R long axis distraction  He may significantly benefit from lumbar traction to help with his radiculopathy and paresthesias in RLE which put him at fall risk  He continues to remain limited with hip ROM, SL balance, decreased sensation in R foot, Decreased HS and gastroc flexibility and decreased hip strength  At this time, skilled physical therapy is warranted to address the remaining impairments and aide in return to independent walking s use of an AD   Limiting factors to therapy decreased sensation in R foot and he  demonstrates good motivation  Plan: Continue per plan of care          Goals: Patient's goal is To improve strength, walk, move and do things- ride motorcycle, see son referee    Precautions: prostate CA, R endarterectomy femoral, pacemaker, falls risk- poor sensation in R foot feels like there is a bar in his arch  Dx: balance deficits, LBP- postural deficits hip abd weakness        Daily Treatment Diary   Manuals 5/18 5/20 4/29 5/4 5/6 5/11   LAD  10' R       Hip PROM R Lateral hip abd mobs   nv 10' 10' hip mobs nv   Therapeutic exercise         bridges x30 30x 30x 30x x20 x30   Seated lumbar flex     10x :05 10x :05   LP DL #90 pin 4 95# 20x 20x 95#  20x 95#               S/l clamshells 2x10  20x 20x 20x 20x   HS strap st 3x :30 3x :30  4x :20 4x "20 4x :20   Prone press up 2x10 2x10  2x10 Standing lumbar ext over table 10x     Gastroc slant bd st 3x :20 3x :20  3x :20 3x :20 3x :20   ltr         Bike 6'  8' 6'  8' 6 min    Neuro Re-ed         Hip abd isometric seated nv nv  nv 20x :05 20x :05                                       Ther Activity         Sit to stand 2x10 2x10 2x10      sidesteps 5 laps  5 laps 5 laps 5 laps  5 laps   Stairs cane and rail         Gait Training         Walking outside         6mwt 1 min 10sec 1 min 45sec  1 min 10sec 1 min 6sec    Modalities         Lumbar traction 100# supine  nv

## 2022-05-25 ENCOUNTER — OFFICE VISIT (OUTPATIENT)
Dept: PHYSICAL THERAPY | Facility: CLINIC | Age: 77
End: 2022-05-25
Payer: COMMERCIAL

## 2022-05-25 DIAGNOSIS — R53.81 PHYSICAL DECONDITIONING: ICD-10-CM

## 2022-05-25 DIAGNOSIS — R26.89 BALANCE PROBLEMS: Primary | ICD-10-CM

## 2022-05-25 DIAGNOSIS — R29.898 MUSCULAR DECONDITIONING: ICD-10-CM

## 2022-05-25 PROCEDURE — 97112 NEUROMUSCULAR REEDUCATION: CPT

## 2022-05-25 PROCEDURE — 97110 THERAPEUTIC EXERCISES: CPT

## 2022-05-25 NOTE — PROGRESS NOTES
Daily Note     Today's date: 2022  Patient name: Brant Mahoney  :   MRN: [id number]  Referring provider: Vale Swain MD  Dx:   Encounter Diagnosis     ICD-10-CM    1  Balance problems  R26 89    2  Muscular deconditioning  R29 898    3  Physical deconditioning  R53 81        Start Time: 1300  Stop Time: 1345  Total time in clinic (min): 45 minutes    Subjective: Pt reports that he still feel likes he is recovering from his cancer treatment and is very sore and painful this session  Objective: See treatment diary below    Assessment Pt tolerated session well  Pt fatigues post session  Plan: Continue per plan of care          Goals: Patient's goal is To improve strength, walk, move and do things- ride motorcycle, see son referee    Precautions: prostate CA, R endarterectomy femoral, pacemaker, falls risk- poor sensation in R foot feels like there is a bar in his arch  Dx: balance deficits, LBP- postural deficits hip abd weakness        Daily Treatment Diary   Manuals    LAD  10' R WA R      Hip PROM R Lateral hip abd mobs    10' 10' hip mobs nv   Therapeutic exercise         bridges x30 30x 30x  x20 x30   Seated lumbar flex     10x :05 10x :05   LP DL #90 pin 4 95# 20x 20x 95#  20x 90#               S/l clamshells 2x10    20x 20x   HS strap st 3x :30 3x :30   4x "20 4x :20   Prone press up 2x10 2x10  2x10 standing       Gastroc slant bd st 3x :20 3x :20 3x20:  3x :20 3x :20   ltr         Bike 6'  8' 8'   8' 6 min    Neuro Re-ed         Hip abd isometric seated nv nv 10x :05  20x :05 20x :05                                       Ther Activity         Sit to stand 2x10 2x10 2x10      sidesteps 5 laps  5 laps 5 laps    5 laps   Stairs cane and rail         Gait Training         Walking outside         6mwt 1 min 10sec 1 min 45sec 1 min 28 sec  1 min 6sec    Modalities         Lumbar traction 100# supine  nv

## 2022-05-26 DIAGNOSIS — D64.9 ANEMIA, UNSPECIFIED TYPE: ICD-10-CM

## 2022-05-27 ENCOUNTER — REMOTE DEVICE CLINIC VISIT (OUTPATIENT)
Dept: CARDIOLOGY CLINIC | Facility: CLINIC | Age: 77
End: 2022-05-27

## 2022-05-27 DIAGNOSIS — Z95.810 AICD (AUTOMATIC CARDIOVERTER/DEFIBRILLATOR) PRESENT: Primary | ICD-10-CM

## 2022-05-27 PROCEDURE — RECHECK: Performed by: INTERNAL MEDICINE

## 2022-05-27 NOTE — PROGRESS NOTES
Results for orders placed or performed in visit on 05/27/22   Cardiac EP device report    Narrative    SJM BI-V ICD -ACTIVE SYSTEM IS MRI CONDITIONAL  2 WEEK MERLIN TRANSMISSION PER DR MARÍA HECTOR: LOWER RATE WAS INCREASED ON 5/19/22  BATTERY VOLTAGE ADEQUATE (6 3 YRS)  AP-57%, BVP-98%  ALL AVAILABLE LEAD PARAMETERS WITHIN NORMAL LIMITS  NO SIGNIFICANT HIGH RATE EPISODES  CORVUE IMPEDANCE MONITORING WITHIN NORMAL LIMITS  NORMAL DEVICE FUNCTION   GV

## 2022-06-01 RX ORDER — FERROUS SULFATE 325(65) MG
TABLET ORAL
Qty: 60 TABLET | Refills: 1 | Status: SHIPPED | OUTPATIENT
Start: 2022-06-01 | End: 2022-06-30

## 2022-06-01 NOTE — TELEPHONE ENCOUNTER
Royce Rene has requested a refill of ferrous sulfate 325 (65 Fe) mg tablet  Would a refill be appropriate?

## 2022-06-03 ENCOUNTER — OFFICE VISIT (OUTPATIENT)
Dept: PHYSICAL THERAPY | Facility: CLINIC | Age: 77
End: 2022-06-03
Payer: COMMERCIAL

## 2022-06-03 DIAGNOSIS — R29.898 MUSCULAR DECONDITIONING: ICD-10-CM

## 2022-06-03 DIAGNOSIS — R26.89 BALANCE PROBLEMS: Primary | ICD-10-CM

## 2022-06-03 DIAGNOSIS — R53.81 PHYSICAL DECONDITIONING: ICD-10-CM

## 2022-06-03 PROCEDURE — 97110 THERAPEUTIC EXERCISES: CPT | Performed by: PHYSICAL THERAPIST

## 2022-06-03 PROCEDURE — 97112 NEUROMUSCULAR REEDUCATION: CPT | Performed by: PHYSICAL THERAPIST

## 2022-06-03 PROCEDURE — 97140 MANUAL THERAPY 1/> REGIONS: CPT | Performed by: PHYSICAL THERAPIST

## 2022-06-03 NOTE — PROGRESS NOTES
Daily Note     Today's date: 6/3/2022  Patient name: Brant Mahoney  : [id number]  MRN: [id number]  Referring provider: Vale Swain MD  Dx:   Encounter Diagnosis     ICD-10-CM    1  Balance problems  R26 89    2  Muscular deconditioning  R29 898    3  Physical deconditioning  R53 81                   Subjective: Pt reports he is feeling a bit fatigued today  Objective: See treatment diary below    Assessment Pt tolerated session well and demonstrated good motivation  He felt better c doing standing lumbar extension  He needed few rest breaks t/o session  Plan: Continue per plan of care          Goals: Patient's goal is To improve strength, walk, move and do things- ride motorcycle, see son referee    Precautions: prostate CA, R endarterectomy femoral, pacemaker, falls risk- poor sensation in R foot feels like there is a bar in his arch  Dx: balance deficits, LBP- postural deficits hip abd weakness        Daily Treatment Diary   Manuals 5/18 5/20 05/25 6/3 5/6 5/11   LAD  10' R WA R 8'     Hip PROM R Lateral hip abd mobs    10' 10' hip mobs nv   Therapeutic exercise         bridges x30 30x 30x 30x x20 x30   Seated lumbar flex     10x :05 10x :05   LP DL #90 pin 4 95# 20x 20x 95#  20x 90# 20x #90     Lumbar ext    2x10     S/l clamshells 2x10    20x 20x   HS strap st 3x :30 3x :30   4x "20 4x :20   Prone press up 2x10 2x10  2x10 standing  2x10      Gastroc slant bd st 3x :20 3x :20 3x20: 3x :20 3x :20 3x :20   ltr         Bike 6'  8' 8'  6' 8' 6 min    Neuro Re-ed         Hip abd isometric seated nv nv 10x :05 10x :05 20x :05 20x :05                                       Ther Activity         Sit to stand 2x10 2x10 2x10 2x10     sidesteps 5 laps  5 laps 5 laps    5 laps   Stairs cane and rail         Gait Training         Walking outside         6mwt 1 min 10sec 1 min 45sec 1 min 28 sec 1 min 38 sec 1 min 6sec    Modalities         Lumbar traction 100# supine  nv

## 2022-06-07 ENCOUNTER — OFFICE VISIT (OUTPATIENT)
Dept: PALLIATIVE MEDICINE | Facility: CLINIC | Age: 77
End: 2022-06-07
Payer: COMMERCIAL

## 2022-06-07 VITALS
TEMPERATURE: 96.6 F | WEIGHT: 198.41 LBS | DIASTOLIC BLOOD PRESSURE: 70 MMHG | RESPIRATION RATE: 14 BRPM | BODY MASS INDEX: 31.08 KG/M2 | HEART RATE: 83 BPM | SYSTOLIC BLOOD PRESSURE: 114 MMHG | OXYGEN SATURATION: 97 %

## 2022-06-07 DIAGNOSIS — C61 PROSTATE CANCER (HCC): ICD-10-CM

## 2022-06-07 DIAGNOSIS — Z51.5 PALLIATIVE CARE PATIENT: ICD-10-CM

## 2022-06-07 DIAGNOSIS — G89.3 CANCER RELATED PAIN: ICD-10-CM

## 2022-06-07 PROCEDURE — 99213 OFFICE O/P EST LOW 20 MIN: CPT | Performed by: FAMILY MEDICINE

## 2022-06-07 PROCEDURE — 1160F RVW MEDS BY RX/DR IN RCRD: CPT | Performed by: FAMILY MEDICINE

## 2022-06-07 PROCEDURE — 1036F TOBACCO NON-USER: CPT | Performed by: FAMILY MEDICINE

## 2022-06-07 RX ORDER — OXYCODONE HYDROCHLORIDE 10 MG/1
10 TABLET ORAL 3 TIMES DAILY PRN
Qty: 90 TABLET | Refills: 0 | Status: SHIPPED | OUTPATIENT
Start: 2022-06-07 | End: 2022-07-07 | Stop reason: SDUPTHER

## 2022-06-07 NOTE — PATIENT INSTRUCTIONS
Next time, please either bring your pills and bottles, or a list of the medicines you are taking  We like to match this list with our computer records every time

## 2022-06-07 NOTE — PROGRESS NOTES
Outpatient Follow-Up - Palliative and Supportive Care   Case Rene 68 y o  male [id number]    Assessment & Plan  Problem List Items Addressed This Visit    None         Medications adjusted this encounter:  Requested Prescriptions      No prescriptions requested or ordered in this encounter     No orders of the defined types were placed in this encounter  There are no discontinued medications  - Meds refilled x1 mo earlier this week  - Ongoing close f/up in clinic   - Will need to attempt to engage family again re: pt's function and orientation  = Until greater degree of overwatch and medicine familiarity, continue with VERY judicious opioid usage for severe cancer related pain  Lynne Young was seen today for symptoms and planning cares related to above illnesses  I have reviewed the patient's controlled substance dispensing history in the Prescription Drug Monitoring Program in compliance with the [facility] regulations before prescribing any controlled substances  They are invited to continue to follow with us  If there are questions or concerns, please contact us through our clinic/answering service 24 hours a day, seven days a week  Luz Barrow MD  [facility]        Visit Information    Accompanied By: Family member    Source of History: Self, Family member    History Limitations: limited health literacy    Contacts: son Keisha Alarcon - [phone]               Daughter Heather Roe - [phone]    History of Present Illness      Lynne Young is a 68 y o  male who presents in follow up of symptoms related to Stage IV prostate cancer, hormone treatment resistant  He follow with Dr Sherman Awan and Ms Destiny Kamara for this  There is also a significant cardiovascular history, with heart failure management by Dr Matty Mendez, and PAD with distal pain at rest in both feet    Pertinent issues include: symptom management, disease process education and discussion of prognosis      Since last visit, he is participating with PT  Feels less tight in his legs and back  OxyIR is helpful, and he requests refill on this med  Again, we attempt to engage him on dosing and intervals for other medicines, including meds written by Dr Ellis Jacob for nerve pain and muscle spasms  Pt had no recolleciton of this provider, and was very fuzzy on indications for gabapentin and methocarbamol, thinking these more related to vascular pain  (Of note, Dr Ellis Jacob is an IM provider, not a vascular doc )  Family not with him in visit today  As per our previous experience, he does appear sharper today than his original presentation, and in less pain  He continues to both endorse very limited understanding of his meds, and no willingness to use help from family on this matter  He has not seen PCP in quite some time, and has no desire to return unless very strongly indicated  From our first visit on 3/1:      "  Pt has demonstrated -- over a year ago now -- progression of his illness (rising PSA and decreasing functional status) despite aggressive therapies and cytotoxic treatments  His side effects vs decompensation in his overall illness was so severe he was admitted to [address] [place] [address] in march 2021  Since that time, he has recovered some function with careful management of his cardiac disease, and less aggressive, less toxic cancer cares  At this time, his treatment plan involves Xofigo for bone-avid disease, +/- Lupron  We note that the patient has demonstrated some disease progression on other hormonal blockers, but that this plan of care is being offered, if not rec'd, by Dr Cameron Burgos  Pt presents to our office with suboptimally controlled pain, ongoing wt loss, poor appetite, and general malaise    His concerns are more related to his back and R foot pain; a Podiatrist today advised him that his unilateral neuropathic symptoms 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     Today, he can't quite recall his medications, he just insists that he needs 'something stronger than tynlenol'  When we pointed out that he has used tramadol and oxyIR recently, he also stated that these medications were not helpful  Pt endorses support from his son, who will drive pt to appts and on other errands  Son does not help with meds  Pt can't name his meds, but has some vague understanding of the indications for his meds "    Past medical, surgical, social, and family histories are reviewed and pertinent updates are made  Review of Systems   Constitutional: Negative for decreased appetite, weight gain and weight loss  HENT: Negative for hoarse voice and nosebleeds  Eyes: Negative for vision loss in left eye and vision loss in right eye  Cardiovascular: Negative for chest pain and dyspnea on exertion  Respiratory: Negative for cough and shortness of breath  Endocrine: Negative for polydipsia, polyphagia and polyuria  Skin: Negative for flushing and itching  Musculoskeletal: Positive for arthritis and back pain  Negative for falls, muscle cramps, muscle weakness and myalgias  Gastrointestinal: Negative for anorexia, jaundice, nausea and vomiting  Genitourinary: Negative for frequency  Neurological: Negative for dizziness  Psychiatric/Behavioral: Negative for depression and memory loss  The patient is not nervous/anxious  Vital Signs    /70 (BP Location: Left arm, Patient Position: Sitting, Cuff Size: Standard)   Pulse 83   Temp (!) 96 6 °F (35 9 °C) (Temporal)   Resp 14   Wt 90 kg (198 lb 6 6 oz)   SpO2 97%   BMI 31 08 kg/m²     Physical Exam and Objective Data  Physical Exam  Constitutional:       General: He is not in acute distress  Appearance: He is ill-appearing  He is not toxic-appearing or diaphoretic  Comments: Frail, chronically ill   HENT:      Head: Normocephalic and atraumatic        Right Ear: External ear normal       Left Ear: External ear normal       Mouth/Throat:      Mouth: Mucous membranes are dry  Pharynx: No oropharyngeal exudate  Eyes:      General:         Right eye: No discharge  Left eye: No discharge  Conjunctiva/sclera: Conjunctivae normal       Pupils: Pupils are equal, round, and reactive to light  Neck:      Trachea: No tracheal deviation  Cardiovascular:      Rate and Rhythm: Normal rate and regular rhythm  Pulmonary:      Effort: Pulmonary effort is normal  No respiratory distress  Breath sounds: No stridor  Abdominal:      Palpations: Abdomen is soft  Comments: scaphoid   Skin:     General: Skin is warm and dry  Coloration: Skin is pale  Findings: No erythema or rash  Neurological:      General: No focal deficit present  Mental Status: He is alert and oriented to person, place, and time  Mental status is at baseline  Cranial Nerves: No cranial nerve deficit  Psychiatric:         Mood and Affect: Mood normal          Thought Content: Thought content normal       Comments: Insight limited, judgment limited  Radiology and Laboratory:  I personally reviewed and interpreted the following results: none new    25+ minutes was spent face to face with Royce Rene and his family with greater than 50% of the time spent in counseling or coordination of care including: chart review; symptom pursuit; supportive listening; anticipatory guidance  All of the patient's or agent's questions were answered during this discussion

## 2022-06-08 ENCOUNTER — OFFICE VISIT (OUTPATIENT)
Dept: PHYSICAL THERAPY | Facility: CLINIC | Age: 77
End: 2022-06-08
Payer: COMMERCIAL

## 2022-06-08 DIAGNOSIS — R53.81 PHYSICAL DECONDITIONING: ICD-10-CM

## 2022-06-08 DIAGNOSIS — R29.898 MUSCULAR DECONDITIONING: ICD-10-CM

## 2022-06-08 DIAGNOSIS — R26.89 BALANCE PROBLEMS: Primary | ICD-10-CM

## 2022-06-08 PROCEDURE — 97140 MANUAL THERAPY 1/> REGIONS: CPT | Performed by: PHYSICAL THERAPIST

## 2022-06-08 PROCEDURE — 97110 THERAPEUTIC EXERCISES: CPT | Performed by: PHYSICAL THERAPIST

## 2022-06-08 PROCEDURE — 97112 NEUROMUSCULAR REEDUCATION: CPT

## 2022-06-08 NOTE — PROGRESS NOTES
Daily Note     Today's date: 2022  Patient name: Lucinda Recinos  :   MRN: [id number]  Referring provider: Kenny Magana MD  Dx:   Encounter Diagnosis     ICD-10-CM    1  Balance problems  R26 89    2  Muscular deconditioning  R29 898    3  Physical deconditioning  R53 81                   Subjective: Pt reports he would like to get back into his motorbike but has a hard time lifting his R leg up  Objective: See treatment diary below    Assessment Pt tolerated session well with some fatigue  Trialed hip flexion to reach over motorcycle but he did struggle with this  Plan: Continue per plan of care          Goals: Patient's goal is To improve strength, walk, move and do things- ride motorcycle 28 inch, see son referee    Precautions: prostate CA, R endarterectomy femoral, pacemaker, falls risk- poor sensation in R foot feels like there is a bar in his arch  Dx: balance deficits, LBP- postural deficits hip abd weakness        Daily Treatment Diary   Manuals 5/18 5/20 05/25 6/3 6/8 5/11   LAD  10' R WA R 8' 8'    Hip PROM R Lateral hip abd mobs     10' hip mobs nv   Therapeutic exercise         bridges x30 30x 30x 30x x20 x30   Seated lumbar flex      10x :05   LP DL #90 pin 4 95# 20x 20x 95#  20x 90# 20x #90 20x 90#    Lumbar ext    2x10 2x10    S/l clamshells 2x10    20x 20x   HS strap st 3x :30 3x :30   4x "20 4x :20   Prone press up 2x10 2x10  2x10 standing  2x10  2x10    Gastroc slant bd st 3x :20 3x :20 3x20: 3x :20  3x :20   ltr     20x    Bike 6'  8' 8'  6' 6' 6 min    Neuro Re-ed         Hip abd isometric seated nv nv 10x :05 10x :05 20x :05 20x :05                                       Ther Activity         Sit to stand 2x10 2x10 2x10 2x10 2x10    sidesteps 5 laps  5 laps 5 laps   5 laps 5 laps   Standing hip flexion to table height- getting in motorcycle     2x10    Stairs cane and rail         Gait Training         Walking outside         6mwt 1 min 10sec 1 min 45sec 1 min 28 sec 1 min 38 sec 1 min 42 sec    Modalities

## 2022-06-10 ENCOUNTER — OFFICE VISIT (OUTPATIENT)
Dept: PHYSICAL THERAPY | Facility: CLINIC | Age: 77
End: 2022-06-10
Payer: COMMERCIAL

## 2022-06-10 ENCOUNTER — REMOTE DEVICE CLINIC VISIT (OUTPATIENT)
Dept: CARDIOLOGY CLINIC | Facility: CLINIC | Age: 77
End: 2022-06-10

## 2022-06-10 DIAGNOSIS — R29.898 MUSCULAR DECONDITIONING: ICD-10-CM

## 2022-06-10 DIAGNOSIS — R53.81 PHYSICAL DECONDITIONING: Primary | ICD-10-CM

## 2022-06-10 DIAGNOSIS — Z95.810 PRESENCE OF AUTOMATIC CARDIOVERTER/DEFIBRILLATOR (AICD): Primary | ICD-10-CM

## 2022-06-10 DIAGNOSIS — R26.89 BALANCE PROBLEMS: ICD-10-CM

## 2022-06-10 PROCEDURE — 97112 NEUROMUSCULAR REEDUCATION: CPT | Performed by: PHYSICAL THERAPIST

## 2022-06-10 PROCEDURE — 97110 THERAPEUTIC EXERCISES: CPT | Performed by: PHYSICAL THERAPIST

## 2022-06-10 PROCEDURE — RECHECK: Performed by: INTERNAL MEDICINE

## 2022-06-10 PROCEDURE — 97140 MANUAL THERAPY 1/> REGIONS: CPT | Performed by: PHYSICAL THERAPIST

## 2022-06-10 NOTE — PROGRESS NOTES
Daily Note     Today's date: 6/10/2022  Patient name: Phyllis Sloan  : [id number]  MRN: [id number]  Referring provider: Uziel Whitten MD  Dx:   Encounter Diagnosis     ICD-10-CM    1  Physical deconditioning  R53 81    2  Muscular deconditioning  R29 898    3  Balance problems  R26 89                   Subjective: Pt reports he is feeling about the same today and felt no worse p last session  Objective: See treatment diary below    Assessment Pt tolerated session well with some fatigue  He requested HS stretching today to try to assist getting his legs over his motorcycle  Plan: Continue per plan of care          Goals: Patient's goal is To improve strength, walk, move and do things- ride motorcycle 28 inch, see son referee    Precautions: prostate CA, R endarterectomy femoral, pacemaker, falls risk- poor sensation in R foot feels like there is a bar in his arch  Dx: balance deficits, LBP- postural deficits hip abd weakness        Daily Treatment Diary   Manuals 5/18 5/20 05/25 6/3 6/8 6/10   LAD  10' R WA R 8' 8' 5'   B HS stretching      8'   Therapeutic exercise         bridges x30 30x 30x 30x x20 x30   Seated lumbar flex         LP DL #90 pin 4 95# 20x 20x 95#  20x 90# 20x #90 20x 90# 20x #95   Lumbar ext    2x10 2x10 2x10   S/l clamshells 2x10    20x 20x   HS strap st 3x :30 3x :30   4x "20 4x :20   Prone press up 2x10 2x10  2x10 standing  2x10  2x10 2x10   Gastroc slant bd st 3x :20 3x :20 3x20: 3x :20     ltr     20x 20x   Bike 6'  8' 8'  6' 6' 6 min    Neuro Re-ed         Hip abd isometric seated nv nv 10x :05 10x :05 20x :05 20x :05                                       Ther Activity         Sit to stand 2x10 2x10 2x10 2x10 2x10 2x10   sidesteps 5 laps  5 laps 5 laps   5 laps 5 laps   Standing hip flexion to table height- getting in motorcycle     2x10    Stairs cane and rail         Gait Training         Walking outside         6mwt 1 min 10sec 1 min 45sec 1 min 28 sec 1 min 38 sec 1 min 42 sec nv Modalities

## 2022-06-10 NOTE — PROGRESS NOTES
Results for orders placed or performed in visit on 06/10/22   Cardiac EP device report    Narrative    SJ BI-V ICD -ACTIVE SYSTEM IS MRI CONDITIONAL  NON-BILLABLE MERLIN TRANSMISSION: 2 WK CHECK FOR VT PER   SN: BATTERY VOLTAGE ADEQUATE (6 2 YRS)  AP: 65%  BP: 98%  ALL AVAILABLE LEAD PARAMETERS WITHIN NORMAL LIMITS  1 AMS W/ EGM SHOWING PAT, DURATION 8 SECS  NO VT DETECTED  AF BURDEN: <1%  PT TAKES PLAVIX, METOPROLOL SUCC, ASA 81MG  EF: 25% (ECHO 11/17/21)  CORVUE IMPEDANCE MONITORING WITHIN NORMAL LIMITS  APPROPRIATELY FUNCTIONING BI-V ICD    [address]

## 2022-06-15 ENCOUNTER — OFFICE VISIT (OUTPATIENT)
Dept: PHYSICAL THERAPY | Facility: CLINIC | Age: 77
End: 2022-06-15
Payer: COMMERCIAL

## 2022-06-15 DIAGNOSIS — R53.81 PHYSICAL DECONDITIONING: Primary | ICD-10-CM

## 2022-06-15 DIAGNOSIS — R29.898 MUSCULAR DECONDITIONING: ICD-10-CM

## 2022-06-15 DIAGNOSIS — R26.89 BALANCE PROBLEMS: ICD-10-CM

## 2022-06-15 PROCEDURE — 97530 THERAPEUTIC ACTIVITIES: CPT

## 2022-06-15 PROCEDURE — 97110 THERAPEUTIC EXERCISES: CPT

## 2022-06-15 NOTE — PROGRESS NOTES
Daily Note     Today's date: 6/15/2022  Patient name: Silvano Dimas  :   MRN: [id number]  Referring provider: Lorie Donovan MD  Dx:   Encounter Diagnosis     ICD-10-CM    1  Physical deconditioning  R53 81    2  Muscular deconditioning  R29 898    3  Balance problems  R26 89        Start Time: 1215  Stop Time: 1305  Total time in clinic (min): 50 minutes    Subjective: Pt reports that he still cannot get on his motorcycle  Notes general fatigue, unsure of the cause  Objective: See treatment diary below    Assessment Pt tolerated session fair requiring rest breaks throughout session  He continues with complaints of hip/back pain throughout session  Repeated extensions did not influence his pain this visit  Further progression was limited due to pt fatigue  Plan: Continue per plan of care          Goals: Patient's goal is To improve strength, walk, move and do things- ride motorcycle 28 inch, see son referee    Precautions: prostate CA, R endarterectomy femoral, pacemaker, falls risk- poor sensation in R foot feels like there is a bar in his arch  Dx: balance deficits, LBP- postural deficits hip abd weakness        Daily Treatment Diary   Manuals 06/15  05/25 6/3 6 610   LAD nv  WA R 8' 8' 5'   B HS stretching nv     8'   Therapeutic exercise         bridges   30x 30x x20 x30   Seated lumbar flex         LP DL #90 pin 4 95# 20   20x 90# 20x #90 20x 90# 20x #95   Lumbar ext    2x10 2x10 2x10   S/l clamshells     20x 20x   HS strap st 4x30"    4x "20 4x :20   Prone press up 2x10  2x10 standing  2x10  2x10 2x10   Gastroc slant bd st 3x20:  3x20: 3x :20     ltr     20x 20x   Bike 6 min  8'  6' 6' 6 min    Neuro Re-ed         Hip abd isometric seated 20x:05  10x :05 10x :05 20x :05 20x :05                                       Ther Activity         Sit to stand 2x10   2x10 2x10 2x10 2x10   sidesteps 5 laps   5 laps   5 laps 5 laps   Standing hip flexion to table height- getting in motorcycle     2x10 Stairs cane and rail         Gait Training         Walking outside         6mwt 1 min 30  1 min 28 sec 1 min 38 sec 1 min 42 sec nv   Modalities

## 2022-06-17 ENCOUNTER — EVALUATION (OUTPATIENT)
Dept: PHYSICAL THERAPY | Facility: CLINIC | Age: 77
End: 2022-06-17
Payer: COMMERCIAL

## 2022-06-17 DIAGNOSIS — R53.81 PHYSICAL DECONDITIONING: Primary | ICD-10-CM

## 2022-06-17 DIAGNOSIS — R29.898 MUSCULAR DECONDITIONING: ICD-10-CM

## 2022-06-17 DIAGNOSIS — R26.89 BALANCE PROBLEMS: ICD-10-CM

## 2022-06-17 PROCEDURE — 97110 THERAPEUTIC EXERCISES: CPT

## 2022-06-17 PROCEDURE — 97530 THERAPEUTIC ACTIVITIES: CPT

## 2022-06-17 NOTE — PROGRESS NOTES
Daily Note and Progress note    Today's date: 2022  Patient name: Melinda Abrams  :   MRN: [id number]  Referring provider: Baltazar Alberts MD  Dx:   Encounter Diagnosis     ICD-10-CM    1  Physical deconditioning  R53 81    2  Muscular deconditioning  R29 898    3  Balance problems  R26 89        Start Time: 1130  Stop Time: 1210  Total time in clinic (min): 40 minutes    Subjective: Patient reports that since starting therapy he has "been better"  He notes improved performance with walking and standing up from a chair  He is also able to go up and down a flight of 15 steps at home which he was not able to do before starting therapy  Moving forward he wants to continue building ROM and strength to further improve his ability to get dressed and do other ADLs  He is still not able to get on his motorcycle  Objective: See treatment diary below  SLB: RLE 1 hand 30 sec  LLE no finger 18 sec      5XStS: trial 1: 13 08 sec, trial 2: 12 33 sec with no hand support     DGI:-  Patient forgot his cane today, increased unsteadiness with higher level tasks of the DGI  Observation:FHP    Observation:  Heel/toe walk:  Gait: R trendelenburg, forward leaning posture c cane  Squat: sit to stand good performance s UE support  Reflexes:NT  Sensation: Desensate to R great toe, decreased sensation to L heel  Stairs: step through pattern c rail  And cane  LAD: -  Slump: B HS tightness  R calf tightness  Lumbar AROM L R Strength L R   Flex  Min joy  Flex 5 5   Ext min joy 10 reps in prone  Ext 5 4+           Hip A/PROM        Flex 110 tight / 115 tight / Flex 4+ 4+   ER at 90 50 45 ER     IR at 90 25 20 IR     Abd 25 20 Abd 4 4-   Ext   Ext             Knee A/PROM   Ankle     Flex   DF 5 5   Ext   PF 4 4           Assessment:  Patient reports to therapy today for a reevalation  Upon examination, the patient displays improved modified single legs stance balance   Lower extremity strength has also improved based on manual muscle testing findings  Overall improvements have led to better performance and function throughout his day  This includes with community and household ambulation and stair navigation  While the stated improvements above have been made, deficits in strength, endurance, balance, and gait still exist with the patient falling short of normal limits on his objective measures  He is still a fall risk according to DGI scores below the cut off  He will continue to benefit from skilled PT in order to further improve strength, endurance, and ROM to promote increased independence with ADLs  Plan: Continue per plan of care  Goals: Patient's goal is To improve strength, walk, move and do things- ride motorcycle, see son referee- goals remain the same the patient is unable to get on his motorcycle       Precautions: prostate CA, R endarterectomy femoral, pacemaker, falls risk- poor sensation in R foot feels like there is a bar in his arch  Dx: balance deficits, LBP- postural deficits hip abd weakness        Daily Treatment Diary     PT 1:1 time 1834-4005  Manuals 06/15  05/25 6/3 6/8 6/10 6/17   LAD nv  WA R 8' 8' 5'    B HS stretching nv     8'    Therapeutic exercise          bridges   30x 30x x20 x30 x30   Seated lumbar flex          LP DL #90 pin 4 95# 20   20x 90# 20x #90 20x 90# 20x #95 20x #95   Lumbar ext    2x10 2x10 2x10 2x10   S/l clamshells     20x 20x 20x    HS strap st 4x30"    4x "20 4x :20 4x :20   Prone press up 2x10  2x10 standing  2x10  2x10 2x10 2x10   Gastroc slant bd st 3x20:  3x20: 3x :20      ltr     20x 20x 20x   Bike 6 min  8'  6' 6' 6 min  6 min    Neuro Re-ed          Hip abd isometric seated 20x:05  10x :05 10x :05 20x :05 20x :05                                            Ther Activity          Sit to stand 2x10   2x10 2x10 2x10 2x10 2x10   sidesteps 5 laps   5 laps   5 laps 5 laps 5 laps    Standing hip flexion to table height- getting in motorcycle     2x10     Stairs cane and adriel          Objective measures from progress note       5'   Gait Training          Walking outside          6mwt 1 min 30  1 min 28 sec 1 min 38 sec 1 min 42 sec nv    Modalities

## 2022-06-22 ENCOUNTER — OFFICE VISIT (OUTPATIENT)
Dept: PHYSICAL THERAPY | Facility: CLINIC | Age: 77
End: 2022-06-22
Payer: COMMERCIAL

## 2022-06-22 DIAGNOSIS — R29.898 MUSCULAR DECONDITIONING: ICD-10-CM

## 2022-06-22 DIAGNOSIS — R26.89 BALANCE PROBLEMS: ICD-10-CM

## 2022-06-22 DIAGNOSIS — R53.81 PHYSICAL DECONDITIONING: Primary | ICD-10-CM

## 2022-06-22 PROCEDURE — 97140 MANUAL THERAPY 1/> REGIONS: CPT | Performed by: PHYSICAL THERAPIST

## 2022-06-22 PROCEDURE — 97110 THERAPEUTIC EXERCISES: CPT | Performed by: PHYSICAL THERAPIST

## 2022-06-22 PROCEDURE — 97112 NEUROMUSCULAR REEDUCATION: CPT | Performed by: PHYSICAL THERAPIST

## 2022-06-24 ENCOUNTER — REMOTE DEVICE CLINIC VISIT (OUTPATIENT)
Dept: CARDIOLOGY CLINIC | Facility: CLINIC | Age: 77
End: 2022-06-24

## 2022-06-24 ENCOUNTER — OFFICE VISIT (OUTPATIENT)
Dept: PHYSICAL THERAPY | Facility: CLINIC | Age: 77
End: 2022-06-24
Payer: COMMERCIAL

## 2022-06-24 DIAGNOSIS — Z95.810 BIVENTRICULAR ICD (IMPLANTABLE CARDIOVERTER-DEFIBRILLATOR) IN PLACE: Primary | ICD-10-CM

## 2022-06-24 DIAGNOSIS — R53.81 PHYSICAL DECONDITIONING: Primary | ICD-10-CM

## 2022-06-24 DIAGNOSIS — R26.89 BALANCE PROBLEMS: ICD-10-CM

## 2022-06-24 DIAGNOSIS — R29.898 MUSCULAR DECONDITIONING: ICD-10-CM

## 2022-06-24 PROCEDURE — 97140 MANUAL THERAPY 1/> REGIONS: CPT | Performed by: PHYSICAL MEDICINE & REHABILITATION

## 2022-06-24 PROCEDURE — 97110 THERAPEUTIC EXERCISES: CPT | Performed by: PHYSICAL MEDICINE & REHABILITATION

## 2022-06-24 PROCEDURE — RECHECK

## 2022-06-24 NOTE — PROGRESS NOTES
Daily Note     Today's date: 2022  Patient name: Sarmad Rangel  :   MRN: [id number]  Referring provider: Thomas Malik MD  Dx:   Encounter Diagnosis     ICD-10-CM    1  Physical deconditioning  R53 81    2  Muscular deconditioning  R29 898    3  Balance problems  R26 89                   Subjective: Patient reports foot pain, felt well after last visit  Objective: See treatment diary below      Assessment: Fair tolerance to intervention, R foot pain limits standing tolerance  Continue as able  Plan: Continue per plan of care  Goals: Patient's goal is To improve strength, walk, move and do things- ride motorcycle, see son referee- goals remain the same the patient is unable to get on his motorcycle       Precautions: prostate CA, R endarterectomy femoral, pacemaker, falls risk- poor sensation in R foot feels like there is a bar in his arch  Dx: balance deficits, LBP- postural deficits hip abd weakness    Daily Treatment Diary     Manuals 6/24  05/25 6/3 6/8 6/10 6/17 6/22   LAD B, LH  WA R 8' 8' 5'  KK   B HS stretching      8'     Therapeutic exercise           bridges 30x  30x 30x x20 x30 x30 x30   Seated lumbar flex           LP DL #90 pin 4 105# 3x10  20x 90# 20x #90 20x 90# 20x #95 20x #95 100# 3x10   Lumbar ext    2x10 2x10 2x10 2x10    S/l clamshells     20x 20x 20x  20x   HS strap st     4x "20 4x :20 4x :20 4x :20   Prone press up EIS 2x10  2x10 standing  2x10  2x10 2x10 2x10 2x10   Gastroc slant bd st 3x20"  3x20: 3x :20    3x :20   ltr     20x 20x 20x 20x   Bike 6'  8'  6' 6' 6 min  6 min  6 min   Neuro Re-ed           Hip abd isometric seated   10x :05 10x :05 20x :05 20x :05                                                 Ther Activity           Sit to stand 2x10  2x10 2x10 2x10 2x10 2x10 2x10   sidesteps 5 laps  5 laps   5 laps 5 laps 5 laps  5 laps   Standing hip flexion to table height- getting in motorcycle     2x10      Stairs cane and rail           Objective measures from progress note       5'    Gait Training           Walking outside           6mwt   1 min 28 sec 1 min 38 sec 1 min 42 sec nv     Modalities

## 2022-06-24 NOTE — PROGRESS NOTES
Results for orders placed or performed in visit on 06/24/22   Cardiac EP device report    Narrative    SJM BI-V ICD -ACTIVE SYSTEM IS MRI CONDITIONAL  N/B; MERLIN TRANSMISSION - 2 WEEK EPISODE (VT) CHECK PER SN: BATTERY VOLTAGE ADEQUATE (6 1 YRS)  AP 62% BVP 98% (DDDR 70)  ALL AVAILABLE LEAD PARAMETERS WITHIN NORMAL LIMITS  NO SIGNIFICANT HIGH RATE EPISODES  3 AMS EPISODES, 6 SEC DURATION WITH BRIEF PAT  AT/AF BURDEN <1%  PATIENT ON ASA 81, CLOPIDOGREL, METOPROLOL SUCC  CORVUE IMPEDANCE MONITORING WITHIN NORMAL LIMITS  NORMAL DEVICE FUNCTION    ES

## 2022-06-27 DIAGNOSIS — I25.10 CORONARY ARTERY DISEASE INVOLVING NATIVE HEART WITHOUT ANGINA PECTORIS, UNSPECIFIED VESSEL OR LESION TYPE: ICD-10-CM

## 2022-06-27 RX ORDER — CLOPIDOGREL BISULFATE 75 MG/1
75 TABLET ORAL DAILY
Qty: 90 TABLET | Refills: 1 | Status: SHIPPED | OUTPATIENT
Start: 2022-06-27

## 2022-06-27 NOTE — TELEPHONE ENCOUNTER
Received fax from [address] [place] [address] in regards to [facility]  Fax requesting a refill of clopidogrel (PLAVIX) 75 mg tablet  Refill to be sent to the [facility] on [address]

## 2022-06-29 ENCOUNTER — OFFICE VISIT (OUTPATIENT)
Dept: PHYSICAL THERAPY | Facility: CLINIC | Age: 77
End: 2022-06-29
Payer: COMMERCIAL

## 2022-06-29 DIAGNOSIS — R26.89 BALANCE PROBLEMS: ICD-10-CM

## 2022-06-29 DIAGNOSIS — R53.81 PHYSICAL DECONDITIONING: Primary | ICD-10-CM

## 2022-06-29 DIAGNOSIS — R29.898 MUSCULAR DECONDITIONING: ICD-10-CM

## 2022-06-29 PROCEDURE — 97140 MANUAL THERAPY 1/> REGIONS: CPT | Performed by: PHYSICAL THERAPIST

## 2022-06-29 PROCEDURE — 97112 NEUROMUSCULAR REEDUCATION: CPT | Performed by: PHYSICAL THERAPIST

## 2022-06-29 PROCEDURE — 97110 THERAPEUTIC EXERCISES: CPT | Performed by: PHYSICAL THERAPIST

## 2022-06-29 NOTE — PROGRESS NOTES
Daily Note     Today's date: 2022  Patient name: Kajal Vega  : [id number]  MRN: [id number]  Referring provider: Madai Worley MD  Dx:   Encounter Diagnosis     ICD-10-CM    1  Physical deconditioning  R53 81    2  Balance problems  R26 89    3  Muscular deconditioning  R29 898                   Subjective: Patient states his R foot is really bothering him today and is not sure how long he can walk today  Objective: See treatment diary below      Assessment: Standing flexion did seem to make his LBP worse but he did have good ROM  He would benefit from continued lumbar extension exercises and will continue to work on hip flexor strength  Plan: Continue per plan of care  Goals: Patient's goal is To improve strength, walk, move and do things- ride motorcycle, see son referee- goals remain the same the patient is unable to get on his motorcycle       Precautions: prostate CA, R endarterectomy femoral, pacemaker, falls risk- poor sensation in R foot feels like there is a bar in his arch  Dx: balance deficits, LBP- postural deficits hip abd weakness    Daily Treatment Diary     Manuals 6/24 6/29 05/25 6/3 6/8 6/10 6/17 6/22   LAD B B, LH 6' WA R 8' 8' 5'  KK   B HS stretching  6    8'     Therapeutic exercise           bridges 30x 20x 30x 30x x20 x30 x30 x30   Seated lumbar flex           LP DL #90 pin 4 105# 3x10 3x10 #105 20x 90# 20x #90 20x 90# 20x #95 20x #95 100# 3x10   Lumbar ext  2x10  2x10 2x10 2x10 2x10    S/l clamshells  20x   20x 20x 20x  20x   HS strap st  4x :20   4x "20 4x :20 4x :20 4x :20   Prone press up EIS 2x10  2x10 standing  2x10  2x10 2x10 2x10 2x10   Gastroc slant bd st 3x20"  3x20: 3x :20    3x :20   ltr     20x 20x 20x 20x   Bike 6' 6' 8'  6' 6' 6 min  6 min  6 min   Neuro Re-ed           Hip abd isometric seated   10x :05 10x :05 20x :05 20x :05                                                 Ther Activity           Sit to stand 2x10 5x 2x10 2x10 2x10 2x10 2x10 2x10   sidesteps 5 laps  5 laps   5 laps 5 laps 5 laps  5 laps   Standing hip flexion to table height- getting in motorcycle  5x   2x10      Stairs cane and rail           Walking over hurdles  nv     5'    Gait Training                      6mwt   1 min 28 sec 1 min 38 sec 1 min 42 sec nv     Modalities

## 2022-06-30 DIAGNOSIS — D64.9 ANEMIA, UNSPECIFIED TYPE: ICD-10-CM

## 2022-06-30 RX ORDER — FERROUS SULFATE 325(65) MG
TABLET ORAL
Qty: 180 TABLET | Refills: 1 | Status: SHIPPED | OUTPATIENT
Start: 2022-06-30

## 2022-06-30 NOTE — TELEPHONE ENCOUNTER
Shazia Rene has requested a 90 day supply of ferrous sulfate 325 (65 Fe) mg tablet  Pt [facility] not meet the [facility]  Would a 90 day supply be appropriate?

## 2022-07-01 ENCOUNTER — OFFICE VISIT (OUTPATIENT)
Dept: PHYSICAL THERAPY | Facility: CLINIC | Age: 77
End: 2022-07-01
Payer: COMMERCIAL

## 2022-07-01 DIAGNOSIS — R29.898 MUSCULAR DECONDITIONING: ICD-10-CM

## 2022-07-01 DIAGNOSIS — R53.81 PHYSICAL DECONDITIONING: Primary | ICD-10-CM

## 2022-07-01 DIAGNOSIS — R26.89 BALANCE PROBLEMS: ICD-10-CM

## 2022-07-01 PROCEDURE — 97110 THERAPEUTIC EXERCISES: CPT

## 2022-07-01 NOTE — PROGRESS NOTES
Daily Note     Today's date: 2022  Patient name: Doc Emmanuel  :   MRN: [id number]  Referring provider: Denia Iniguez MD  Dx:   Encounter Diagnosis     ICD-10-CM    1  Physical deconditioning  R53 81    2  Balance problems  R26 89    3  Muscular deconditioning  R29 898        Start Time: 1145  Stop Time: 1245  Total time in clinic (min): 60 minutes    Subjective: Patient reports that he is experiencing a lot of R foot pain and hamstring pain today  Objective: See treatment diary below      Assessment: Pt tolerated session fair  Held more standing interventions due to foot pain  Plan: Continue per plan of care  Goals: Patient's goal is To improve strength, walk, move and do things- ride motorcycle, see son referee- goals remain the same the patient is unable to get on his motorcycle       Precautions: prostate CA, R endarterectomy femoral, pacemaker, falls risk- poor sensation in R foot feels like there is a bar in his arch  Dx: balance deficits, LBP- postural deficits hip abd weakness    Daily Treatment Diary     Manuals 6/24 6/29 07/01  6/8 6/10 6/17 6/22   LAD B B, LH 6' WA  8' 5'  KK   B HS stretching  6' Held today   8'     Therapeutic exercise           bridges 30x 20x 20  x20 x30 x30 x30   Seated lumbar flex           LP DL #90 pin 4 105# 3x10 3x10 #105 3x10 105#  20x 90# 20x #95 20x #95 100# 3x10   Lumbar ext  2x10 2x10  2x10 2x10 2x10    S/l clamshells  20x 20x  20x 20x 20x  20x   HS strap st  4x :20 4x :20   4x "20 4x :20 4x :20 4x :20   Prone press up EIS 2x10    2x10 2x10 2x10 2x10   Gastroc slant bd st 3x20"       3x :20   ltr     20x 20x 20x 20x   Bike 6' 6' 6'   6' 6 min  6 min  6 min   Neuro Re-ed           Hip abd isometric seated     20x :05 20x :05                                                 Ther Activity           Sit to stand 2x10 5x 5x  2x10 2x10 2x10 2x10   sidesteps 5 laps    5 laps 5 laps 5 laps  5 laps   Standing hip flexion to table height- getting in motorcycle  5x 5x  2x10      Stairs cane and rail           Walking over hurdles  nv     5'    Gait Training                      6mwt     1 min 42 sec nv     Modalities

## 2022-07-07 ENCOUNTER — OFFICE VISIT (OUTPATIENT)
Dept: PALLIATIVE MEDICINE | Facility: CLINIC | Age: 77
End: 2022-07-07
Payer: COMMERCIAL

## 2022-07-07 ENCOUNTER — TELEPHONE (OUTPATIENT)
Dept: HEMATOLOGY ONCOLOGY | Facility: CLINIC | Age: 77
End: 2022-07-07

## 2022-07-07 VITALS
BODY MASS INDEX: 31.15 KG/M2 | DIASTOLIC BLOOD PRESSURE: 80 MMHG | SYSTOLIC BLOOD PRESSURE: 114 MMHG | OXYGEN SATURATION: 97 % | TEMPERATURE: 96.8 F | WEIGHT: 198.85 LBS | HEART RATE: 79 BPM | RESPIRATION RATE: 14 BRPM

## 2022-07-07 DIAGNOSIS — I50.22 CHRONIC HFREF (HEART FAILURE WITH REDUCED EJECTION FRACTION) (HCC): ICD-10-CM

## 2022-07-07 DIAGNOSIS — G89.29 HEEL PAIN, CHRONIC, RIGHT: Primary | ICD-10-CM

## 2022-07-07 DIAGNOSIS — M79.671 HEEL PAIN, CHRONIC, RIGHT: Primary | ICD-10-CM

## 2022-07-07 DIAGNOSIS — Z51.5 PALLIATIVE CARE PATIENT: ICD-10-CM

## 2022-07-07 DIAGNOSIS — C61 PROSTATE CANCER (HCC): ICD-10-CM

## 2022-07-07 DIAGNOSIS — G89.3 CANCER RELATED PAIN: ICD-10-CM

## 2022-07-07 PROCEDURE — 99214 OFFICE O/P EST MOD 30 MIN: CPT | Performed by: FAMILY MEDICINE

## 2022-07-07 RX ORDER — OXYCODONE HYDROCHLORIDE 10 MG/1
10 TABLET ORAL 3 TIMES DAILY PRN
Qty: 90 TABLET | Refills: 0 | Status: SHIPPED | OUTPATIENT
Start: 2022-07-07 | End: 2022-08-08 | Stop reason: SDUPTHER

## 2022-07-07 NOTE — PROGRESS NOTES
Outpatient Follow-Up - Palliative and Supportive Care   Blaze Rene 68 y o  male [id number]    Assessment & Plan  Problem List Items Addressed This Visit     Palliative care patient (Chronic)    Relevant Medications    oxyCODONE (ROXICODONE) 10 MG TABS    Prostate cancer (HCC)    Relevant Medications    oxyCODONE (ROXICODONE) 10 MG TABS    Other Relevant Orders    X-ray Foot 3+ view right      Other Visit Diagnoses     Heel pain, chronic, right    -  Primary    Relevant Orders    Ambulatory Referral to Physical Therapy    Cancer related pain        Relevant Medications    oxyCODONE (ROXICODONE) 10 MG TABS    Chronic HFrEF (heart failure with reduced ejection fraction) (HCC)              Medications adjusted this encounter:  Requested Prescriptions     Signed Prescriptions Disp Refills    oxyCODONE (ROXICODONE) 10 MG TABS 90 tablet 0     Sig: Take 1 tablet (10 mg total) by mouth 3 (three) times a day as needed (cancer pain) Max Daily Amount: 30 mg     Orders Placed This Encounter   Procedures    X-ray Foot 3+ view right    Ambulatory Referral to Physical Therapy     Medications Discontinued During This Encounter   Medication Reason    lidocaine (LIDODERM) 5 %     oxyCODONE (ROXICODONE) 10 MG TABS Reorder      - Ongoing close f/up in clinic   - Pt continues to be an ongoing risk for medicatoin misuse:   = blind in one eye   = Limited medical savvy   = Fairly hard of hearing   = Little oversight or support from his family in medication checks  Jules Barry was seen today for symptoms and planning cares related to above illnesses  I have reviewed the patient's controlled substance dispensing history in the Prescription Drug Monitoring Program in compliance with the [facility] regulations before prescribing any controlled substances  They are invited to continue to follow with us  If there are questions or concerns, please contact us through our clinic/answering service 24 hours a day, seven days a week      Mable Ayala MD [place]  [facility] Palliative and Supportive Care        Visit Information    Accompanied By: Family member    Source of History: Self, Family member    History Limitations: limited health literacy    Contacts: son Raffi Alvarado - [phone]               Daughter Houston Johnson - [phone]    History of Present Illness      Patricio Weiss is a 68 y o  male who presents in follow up of symptoms related to Stage IV prostate cancer, hormone treatment resistant  He follow with Dr Heather Tierney and Ms Janny Camacho for this  There is also a significant cardiovascular history, with heart failure management by Dr Jet Monae, and PAD with distal pain at rest in both feet  Pertinent issues include: symptom management, disease process education and discussion of prognosis      Since last visit, he notes improvement in pain with our interventions for his cancer pain, and he is satisfied with our plan  We reconciled his home medicine list with our records, identifying potential gaps in care for cancer meds to Dr Silvia Anna thru [address] [place] [address] Rd notification  Pt noted pain in heel, affecting his wt bearing and walking  No falls, no injuries  Unilateral and without inciting incident  With his history of cancer we will gather plain film XR and assess for possibility of met  Otherwise, appears consistent with plantar fasciitis  He is participating with PT  Feels less tight in his legs and back  OxyIR is helpful, and he requests refill on this med  Again, we attempt to engage him on dosing and intervals for other medicines, including meds written by Dr Violet Castro for nerve pain and muscle spasms  Pt had no recolleciton of this provider, and was very fuzzy on indications for gabapentin and methocarbamol, thinking these more related to vascular pain  (Of note, Dr Violet Castro is an IM provider, not a vascular doc )  Family not with him in visit today      As per our previous experience, he does appear sharper today than his original presentation, and in less pain  He continues to both endorse very limited understanding of his meds, and no willingness to use help from family on this matter  He has not seen PCP in quite some time, and has no desire to return unless very strongly indicated  From our first visit on 3/1:    "Pt has demonstrated -- over a year ago now -- progression of his illness (rising PSA and decreasing functional status) despite aggressive therapies and cytotoxic treatments  His side effects vs decompensation in his overall illness was so severe he was admitted to [address] [place] [address]  in march 2021  Since that time, he has recovered some function with careful management of his cardiac disease, and less aggressive, less toxic cancer cares  At this time, his treatment plan involves Xofigo for bone-avid disease, +/- Lupron  We note that the patient has demonstrated some disease progression on other hormonal blockers, but that this plan of care is being offered, if not rec'd, by Dr Jeanine Lynn  Pt presents to our office with suboptimally controlled pain, ongoing wt loss, poor appetite, and general malaise  His concerns are more related to his back and R foot pain; a Podiatrist today advised him that his unilateral neuropathic symptoms 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        Today, he can't quite recall his medications, he just insists that he needs 'something stronger than tynlenol'  When we pointed out that he has used tramadol and oxyIR recently, he also stated that these medications were not helpful  Pt endorses support from his son, who will drive pt to appts and on other errands  Son does not help with meds  Pt can't name his meds, but has some vague understanding of the indications for his meds "    Past medical, surgical, social, and family histories are reviewed and pertinent updates are made  Review of Systems   Constitutional: Negative for decreased appetite, weight gain and weight loss  HENT: Negative for hoarse voice and nosebleeds  Eyes: Negative for vision loss in left eye and vision loss in right eye  Cardiovascular: Negative for chest pain and dyspnea on exertion  Respiratory: Negative for cough and shortness of breath  Endocrine: Negative for polydipsia, polyphagia and polyuria  Skin: Negative for flushing and itching  Musculoskeletal: Positive for arthritis, back pain, joint pain and neck pain  Negative for falls  Gastrointestinal: Positive for nausea  Negative for anorexia, jaundice and vomiting  Genitourinary: Negative for frequency  Neurological: Negative for dizziness  Psychiatric/Behavioral: Negative for depression and memory loss  The patient is not nervous/anxious  Vital Signs    /80 (BP Location: Left arm, Patient Position: Sitting, Cuff Size: Standard)   Pulse 79   Temp (!) 96 8 °F (36 °C) (Temporal)   Resp 14   Wt 90 2 kg (198 lb 13 7 oz)   SpO2 97%   BMI 31 15 kg/m²     Physical Exam and Objective Data  Physical Exam  Constitutional:       General: He is not in acute distress  Appearance: He is not diaphoretic  Comments: Frail   HENT:      Head: Normocephalic and atraumatic  Right Ear: External ear normal       Left Ear: External ear normal    Eyes:      General:         Right eye: No discharge  Left eye: No discharge  Conjunctiva/sclera: Conjunctivae normal       Pupils: Pupils are equal, round, and reactive to light  Neck:      Trachea: No tracheal deviation  Cardiovascular:      Rate and Rhythm: Regular rhythm  Tachycardia present  Pulmonary:      Effort: Pulmonary effort is normal  No respiratory distress  Breath sounds: No stridor  Abdominal:      Palpations: Abdomen is soft  Musculoskeletal:         General: Tenderness (at R calcaneal spur) present  Skin:     General: Skin is warm and dry  Coloration: Skin is pale  Findings: No erythema or rash        Comments: Poor turgor Neurological:      General: No focal deficit present  Mental Status: Mental status is at baseline  Cranial Nerves: No cranial nerve deficit  Psychiatric:         Thought Content: Thought content normal       Comments: Judgment and insight fair           Radiology and Laboratory:  I personally reviewed and interpreted the following results: none new; ordered new films    35+ minutes was spent face to face with Royce Rene  with greater than 50% of the time spent in counseling or coordination of care including: chart review; symptom pursuit; supportive listening; anticipatory guidance  New problem with XR to gather  All of the patient's or agent's questions were answered during this discussion

## 2022-07-07 NOTE — Clinical Note
Pt reports stopping abiraterone/Zytiga and Eligard/leuprolide; at least, he has none of these meds at home  Wanted to make sure you were aware of his behaviors at home  VERY limited insight to his meds, but will follow a written schedule carefully

## 2022-07-07 NOTE — TELEPHONE ENCOUNTER
Verified with Dr Sinan Toledo patient is to be taking 160 mg oral Xtandi and Lupron injection  Called and verified with Cynthia [place] [id number] that the patient has been receiving his Xtandi  Last prescription refill was 6/00/22  Called and spoke with pt, he verified that is his taking his Xtandi as ordered  He stated he has been taking it without any complaints  Pt stated he feels good

## 2022-07-11 DIAGNOSIS — I73.9 PAD (PERIPHERAL ARTERY DISEASE) (HCC): ICD-10-CM

## 2022-07-13 ENCOUNTER — REMOTE DEVICE CLINIC VISIT (OUTPATIENT)
Dept: CARDIOLOGY CLINIC | Facility: CLINIC | Age: 77
End: 2022-07-13
Payer: COMMERCIAL

## 2022-07-13 DIAGNOSIS — C79.51 MALIGNANT NEOPLASM METASTATIC TO BONE (HCC): ICD-10-CM

## 2022-07-13 DIAGNOSIS — Z95.810 PRESENCE OF IMPLANTABLE CARDIOVERTER-DEFIBRILLATOR (ICD): Primary | ICD-10-CM

## 2022-07-13 DIAGNOSIS — C61 PROSTATE CANCER (HCC): ICD-10-CM

## 2022-07-13 PROCEDURE — 93296 REM INTERROG EVL PM/IDS: CPT | Performed by: INTERNAL MEDICINE

## 2022-07-13 PROCEDURE — 93295 DEV INTERROG REMOTE 1/2/MLT: CPT | Performed by: INTERNAL MEDICINE

## 2022-07-13 NOTE — PROGRESS NOTES
SJM BI-V ICD -ACTIVE SYSTEM IS MRI CONDITIONAL   MERLIN TRANSMISSION:  BATTERY VOLTAGE ADEQUATE (6 2 YR )   AP 59% BP 98%   ALL LEAD PARAMETERS WITHIN NORMAL LIMITS   NO SIGNIFICANT HIGH RATE EPISODES   CORVUE IMPEDANCE MONITORING WITHIN NORMAL LIMITS   NORMAL DEVICE FUNCTION   RG

## 2022-07-15 RX ORDER — GABAPENTIN 100 MG/1
CAPSULE ORAL
Qty: 180 CAPSULE | Refills: 2 | Status: SHIPPED | OUTPATIENT
Start: 2022-07-15 | End: 2022-08-08 | Stop reason: SDUPTHER

## 2022-07-15 NOTE — TELEPHONE ENCOUNTER
7/15/22 - Pt said pharmacy will not fill his refill prescription for gabapentin  Said they need to s/w Dr Rocio Melo office first   Please review and contact pharmacy to resolve, and please advise pt of status because pt is completely out of this medication and wants to  today  Jame Brooks

## 2022-07-15 NOTE — TELEPHONE ENCOUNTER
Royce Rene has requested a refill of gabapentin (NEURONTIN) 100 mg capsule  Pt meets the requirements placed by [facility]  Will refill medication

## 2022-07-15 NOTE — TELEPHONE ENCOUNTER
7/15/22 - Note to chart: Called pt and let him know refill prescription was rec'd by the pharmacy today

## 2022-07-18 ENCOUNTER — HOSPITAL ENCOUNTER (OUTPATIENT)
Dept: INFUSION CENTER | Facility: CLINIC | Age: 77
Discharge: HOME | End: 2022-07-18
Payer: COMMERCIAL

## 2022-07-18 ENCOUNTER — HOSPITAL ENCOUNTER (OUTPATIENT)
Dept: RADIOLOGY | Facility: HOSPITAL | Age: 77
Discharge: HOME | End: 2022-07-18
Attending: FAMILY MEDICINE
Payer: COMMERCIAL

## 2022-07-18 DIAGNOSIS — C79.51 MALIGNANT NEOPLASM METASTATIC TO BONE (HCC): ICD-10-CM

## 2022-07-18 DIAGNOSIS — C61 PROSTATE CANCER (HCC): Primary | ICD-10-CM

## 2022-07-18 DIAGNOSIS — C61 PROSTATE CANCER (HCC): ICD-10-CM

## 2022-07-18 DIAGNOSIS — Z95.828 PORT-A-CATH IN PLACE: ICD-10-CM

## 2022-07-18 LAB
ALBUMIN SERPL BCP-MCNC: 3.9 G/DL (ref 3.5–5)
ALP SERPL-CCNC: 83 U/L (ref 34–104)
ALT SERPL W P-5'-P-CCNC: 12 U/L (ref 7–52)
ANION GAP SERPL CALCULATED.3IONS-SCNC: 7 MMOL/L (ref 4–13)
AST SERPL W P-5'-P-CCNC: 13 U/L (ref 13–39)
BASOPHILS # BLD AUTO: 0.02 THOUSANDS/ΜL (ref 0–0.1)
BASOPHILS NFR BLD AUTO: 1 % (ref 0–1)
BILIRUB SERPL-MCNC: 0.73 MG/DL (ref 0.2–1)
BUN SERPL-MCNC: 14 MG/DL (ref 5–25)
CALCIUM SERPL-MCNC: 8.6 MG/DL (ref 8.4–10.2)
CHLORIDE SERPL-SCNC: 107 MMOL/L (ref 96–108)
CO2 SERPL-SCNC: 26 MMOL/L (ref 21–32)
CREAT SERPL-MCNC: 0.74 MG/DL (ref 0.6–1.3)
EOSINOPHIL # BLD AUTO: 0.14 THOUSAND/ΜL (ref 0–0.61)
EOSINOPHIL NFR BLD AUTO: 3 % (ref 0–6)
ERYTHROCYTE [DISTWIDTH] IN BLOOD BY AUTOMATED COUNT: 13.1 % (ref 11.6–15.1)
GFR SERPL CREATININE-BSD FRML MDRD: 89 ML/MIN/1.73SQ M
GLUCOSE SERPL-MCNC: 84 MG/DL (ref 65–140)
HCT VFR BLD AUTO: 33.4 % (ref 36.5–49.3)
HGB BLD-MCNC: 11.2 G/DL (ref 12–17)
IMM GRANULOCYTES # BLD AUTO: 0.05 THOUSAND/UL (ref 0–0.2)
IMM GRANULOCYTES NFR BLD AUTO: 1 % (ref 0–2)
LYMPHOCYTES # BLD AUTO: 1.01 THOUSANDS/ΜL (ref 0.6–4.47)
LYMPHOCYTES NFR BLD AUTO: 24 % (ref 14–44)
MCH RBC QN AUTO: 31.1 PG (ref 26.8–34.3)
MCHC RBC AUTO-ENTMCNC: 33.5 G/DL (ref 31.4–37.4)
MCV RBC AUTO: 93 FL (ref 82–98)
MONOCYTES # BLD AUTO: 0.43 THOUSAND/ΜL (ref 0.17–1.22)
MONOCYTES NFR BLD AUTO: 10 % (ref 4–12)
NEUTROPHILS # BLD AUTO: 2.49 THOUSANDS/ΜL (ref 1.85–7.62)
NEUTS SEG NFR BLD AUTO: 61 % (ref 43–75)
NRBC BLD AUTO-RTO: 0 /100 WBCS
PLATELET # BLD AUTO: 198 THOUSANDS/UL (ref 149–390)
PMV BLD AUTO: 9.1 FL (ref 8.9–12.7)
POTASSIUM SERPL-SCNC: 3.6 MMOL/L (ref 3.5–5.3)
PROT SERPL-MCNC: 6.2 G/DL (ref 6.4–8.4)
PSA SERPL-MCNC: 22.2 NG/ML (ref 0–4)
RBC # BLD AUTO: 3.6 MILLION/UL (ref 3.88–5.62)
SODIUM SERPL-SCNC: 140 MMOL/L (ref 135–147)
WBC # BLD AUTO: 4.14 THOUSAND/UL (ref 4.31–10.16)

## 2022-07-18 PROCEDURE — 73630 X-RAY EXAM OF FOOT: CPT

## 2022-07-18 PROCEDURE — 85025 COMPLETE CBC W/AUTO DIFF WBC: CPT

## 2022-07-18 PROCEDURE — 96402 CHEMO HORMON ANTINEOPL SQ/IM: CPT

## 2022-07-18 PROCEDURE — 84153 ASSAY OF PSA TOTAL: CPT

## 2022-07-18 PROCEDURE — 80053 COMPREHEN METABOLIC PANEL: CPT

## 2022-07-18 RX ADMIN — LEUPROLIDE ACETATE 22.5 MG: KIT at 11:05

## 2022-07-18 NOTE — PATIENT INSTRUCTIONS
July 2022 Sunday Monday Tuesday Wednesday Thursday Friday Saturday                            1    PT-TREATMENT  12:00 PM   (30 min )   Norah Basilio PTA   Physical Therapy at [address] [facility] 2                3     4     5     6     7    FOLLOW UP PG   9:45 AM   (40 min )   Laura Zuluaga MD   [address] 8     9                10     11     12     13    REMOTE DEVICE CHECK PG   8:15 AM   (15 min )   [address] 14     15     16                17     18    INF CATHETER MAINTENANCE  11:00 AM   (60 min )   AN INF QUICK [address] [facility] 19     20     21     22     23        Cycle 1, Treatment 4        24     25    NM PET CT SKULL TO THIGH   1:00 PM   (120 min )   BE NM SLN PET [address] 26     27    OFFICE VISIT SHORT PG  10:25 AM   (20 min )   MD Dimple Lopez [facility] 28     84     32                87                                                     Treatment Details         7/18/2022 - Cycle 1, Treatment 4      Supportive Care: APPT 17, leuprolide (LUPRON DEPOT 3 MONTH KIT)        August 2022 Sunday Monday Tuesday Wednesday Thursday Friday Saturday        1     2     3     4     5     6                7     8    FOLLOW UP PG  10:05 AM   (40 min )   Laura Zuluaga MD   [address] 9     10     11     12     13                14     15    INF BLOOD SPECIMENS-CENTRAL  12:00 PM   (60 min )   AN INF QUICK CHAIR   [address] 16     17    REMOTE DEVICE CHECK PG   5:45 AM   (15 min )   [facility] [place]    FOLLOW UP PG   8:45 AM   (20 min )   MD Dimple [facility]wood [facility] [place] 18     19     20                21     22     23     24     25     26     27                28     29     30     31

## 2022-07-18 NOTE — PROGRESS NOTES
Patient here for labs via port and lupron dosing  No changes reported  He is questioning an x-ray he is supposed to have and I was able to find the order and gave him a print out of it  He is aware he can go have this done today over in radiology and was advised about where to check in for this  He tolerated lab draw via port as well as lupron as ordered  Patient was discharged post, schedule given  He will RTO for labs via port 8/15/22

## 2022-07-25 ENCOUNTER — HOSPITAL ENCOUNTER (OUTPATIENT)
Dept: RADIOLOGY | Age: 77
Discharge: HOME | End: 2022-07-25
Payer: COMMERCIAL

## 2022-07-25 DIAGNOSIS — C61 PROSTATE CANCER (HCC): ICD-10-CM

## 2022-07-25 DIAGNOSIS — C79.51 MALIGNANT NEOPLASM METASTATIC TO BONE (HCC): ICD-10-CM

## 2022-07-25 PROCEDURE — 78815 PET IMAGE W/CT SKULL-THIGH: CPT

## 2022-07-25 PROCEDURE — G1004 CDSM NDSC: HCPCS

## 2022-07-25 PROCEDURE — A9595 HB PIFLUFOLASTAT F-18, DIAGNOSTIC, 1 MILLICURIE: HCPCS

## 2022-07-26 ENCOUNTER — TELEPHONE (OUTPATIENT)
Dept: HEMATOLOGY ONCOLOGY | Facility: CLINIC | Age: 77
End: 2022-07-26

## 2022-07-26 NOTE — TELEPHONE ENCOUNTER
Significant findings from PET performed on 7/25/2022:  "IMPRESSION:     1  Small focus of radiotracer uptake at the prostate suspicious for residual disease, more conspicuous from the prior Axumin exam   2   Innumerable osseous metastatic lesions with progression from prior [facility] PET/CT  3   Small intracranial focus of uptake at midline, nonspecific  No obvious findings on limited CT  This could be related to an incidental lesion such as meningioma along the falx, which can demonstrate PSMA activity, rather than metastasis  This could   be further evaluated with dedicated MRI of the brain with and without IV contrast or reassessed on follow up PSMA PET/CT      The study was marked in EPIC for significant notification "      Dr Gilbert Figueroa patient isn't scheduled to see you until 8/17/2022  Would you like this apt to be moved up?

## 2022-07-27 ENCOUNTER — OFFICE VISIT (OUTPATIENT)
Dept: CARDIOLOGY CLINIC | Facility: CLINIC | Age: 77
End: 2022-07-27
Payer: COMMERCIAL

## 2022-07-27 VITALS
HEART RATE: 71 BPM | SYSTOLIC BLOOD PRESSURE: 118 MMHG | OXYGEN SATURATION: 98 % | DIASTOLIC BLOOD PRESSURE: 80 MMHG | BODY MASS INDEX: 31.87 KG/M2 | WEIGHT: 203.5 LBS

## 2022-07-27 DIAGNOSIS — Z95.2 S/P TAVR (TRANSCATHETER AORTIC VALVE REPLACEMENT): ICD-10-CM

## 2022-07-27 DIAGNOSIS — Z95.810 BIVENTRICULAR ICD (IMPLANTABLE CARDIOVERTER-DEFIBRILLATOR) IN PLACE: ICD-10-CM

## 2022-07-27 DIAGNOSIS — I50.22 CHRONIC HFREF (HEART FAILURE WITH REDUCED EJECTION FRACTION) (HCC): Primary | ICD-10-CM

## 2022-07-27 DIAGNOSIS — I25.10 CORONARY ARTERY DISEASE INVOLVING NATIVE CORONARY ARTERY OF NATIVE HEART WITHOUT ANGINA PECTORIS: ICD-10-CM

## 2022-07-27 DIAGNOSIS — I47.20 VENTRICULAR TACHYCARDIA: ICD-10-CM

## 2022-07-27 PROCEDURE — 99214 OFFICE O/P EST MOD 30 MIN: CPT | Performed by: INTERNAL MEDICINE

## 2022-07-27 PROCEDURE — 1160F RVW MEDS BY RX/DR IN RCRD: CPT | Performed by: INTERNAL MEDICINE

## 2022-07-27 RX ORDER — METOPROLOL SUCCINATE 25 MG/1
12.5 TABLET, EXTENDED RELEASE ORAL DAILY
Qty: 45 TABLET
Start: 2022-07-27 | End: 2022-08-01

## 2022-07-27 NOTE — PROGRESS NOTES
Advanced Heart Failure Outpatient Progress Note Kumar Rene 68 y o  male MRN: [id number]    Encounter: [id number]      Assessment/Plan:    Patient Active Problem List    Diagnosis Date Noted    PAD (peripheral artery disease) ([place] [id number] ) 07/19/2021    Lower extremity pain 07/11/2021    Urinary retention 07/11/2021    CAD (coronary artery disease) 07/10/2021    Fever 07/10/2021    Hypotension 07/10/2021    Ischemic leg pain 07/07/2021    Prostate cancer ([place] [id number] ) 07/07/2021    Chronic midline low back pain 07/01/2021    Ischemic cardiomyopathy 07/01/2021    Chronic combined systolic and diastolic CHF (congestive heart failure) ([place] [id number] ) 06/22/2021    S/P AVR 06/22/2021    Anemia 06/22/2021    Cancer-related pain 05/10/2022    Palliative care patient 05/10/2022    Malignant neoplasm metastatic to bone ([place] [id number] ) 02/28/2022    Embolism and 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 ([place] [id number] ) 02/28/2022    Depression, recurrent ([place] [address] ) 02/28/2022    Weight loss 02/28/2022    Muscular deconditioning 02/28/2022    Port-A-Cath in place 10/05/2021          # Chronic HFrEF; LVEF 20%; LVIDd 5 9 cm; NYHA III; ACC/AHA Stage C  Etiology: ischemic +/- progression of valvular disease +/- chemotherapy (on abiraterone and leuprolide with unclear safety profile)    Euvolemic, warm and and well perfused on exam    Diagnostic:    TTE 06/14/2021 (at [facility] pre- and post-TAVR, per report): LVEF 20%  LVIDd 5 4 cm  "entire apex, mid and apical anterior septum, mid and apical inferior septum, mid and apical inferior wall, mid inferolateral wall, and mid lateral wall are akinetic " Trace MR and TR  Pre-TAVR: LVOT VTI 12 0 cm  JOSE 0 47 cm^2  Post: bioprosthetic AV valve present; JOSE 2 41 cm^2                  TTE 06/23/2021:   LVEF 20%  LVIDd 5 9 cm   Grade 2 DD  aneurysmal LV mid to apex "dyskinesis and aneurysmal deformity of the apical anterior, mid anteroseptal, apical inferior, apical septal, and apical wall(s) "   Normal RV size and RVSF  Mild MR  AV bioprosthesis with normal leaflet separation  Trace TR      TTE 11/17/21:  LVEF: 25%  LVIDd: 6 1cm  RV: normal size and systolic function  MR: mild to moderate   Other: Akinesis and aneurysmal deformity of the mid septum and apex  Grade 2 diastology  Ascending aorta 4cm  TAVR bioprosthetic valve, normally functioning  Mean gradient 3mmHg     Neurohormonal Blockade:  --Beta-Blocker: Metoprolol succinate 25 mg daily - decreased to 12 5mg daily  --ACEi, ARB or ARNi: Entresto 24/26 mg BID - switched to losartan 25mg daily due to low blood pressure - decreased to 12 5mg daily due to lightheadedness    (or SVR reduction)  --Aldosterone Receptor Blocker: Spironolactone 12 5 mg daily - off due to low BP  --SGLT2-I:   --Diuretic: Lasix 40 mg daily - decreased to 20mg daily due to lightheadedness     Sudden Cardiac Death Risk Reduction:  --ICD: s/p SJM BIV ICD 12/23/21  Interrogation 7/13/2022:  A paced 59% bi V paced 98%  No significant high rate episodes  Core view within normal  Interrogation 4/29/2022:  1 VT/VF episode status post ATP  No recurrence on subsequent frequent interrogations  Interrogation 1/11/22: AP 17% BVP 96% DDDR 60  No significant high rate episodes  Normal device function     Electrical Resynchronization:  -- LBBB QRSd 158 msec preBIV; QRSd 148msec post BIV     Advanced Therapies (If appropriate): --We will continue to monitor     # Ventricular tachycardia  Detected on device interrogation 4/29/2022 status post ATP  No recurrence  Maintained on beta blocker  # Prostate cancer with bony metastases  Receiving chemotherapy through the [place]  Taking SQ leuprolide (Eligard) and abiraterone (Zytiga) - safety of abiraterone in patients with LVEF <50% not established per FDA  [place]-analysis of drug noted increased incidence and relative risk of CV toxicity      # Coronary artery disease  Without active chest pain  S/pstenting to circumflex and RCA in 2001    [facility] 06/09/2021 (per documentation):"diffuse moderate prox and mid disease/ISR, severe OM lesion and total mid LAD "  Rx: aspirin, plavix, statin     # Aortic stenosis  S/p TAVR (Evolut Pro+ 29mm) on 06/14/2021 at [facility]  Normally functioning on last echo 11/2021    # RLE ischemia  LEAD with >75% stenosis of the CFA/SFA  s/p right femoral endarterectomy 7/9/21  Continues on ASA, Plavix, and statin  Follows with Dr Lori Barry      # Hyperlipidemia     # History of atrial tachycardia: s/p ablation at OSH in 2018  # Benign prostatic hyperplasia      TODAY'S PLAN:  Continue current cardiac medications  2g sodium diet  2L fluid restriction  Daily weights  Physical activities/exercise as tolerated  Follow-up in 4 months or sooner as needed      HPI:   Jeramie Mcghee is a 14-year-old man with a PMH as aboe who presents for hospital follow-up      Presented to [facility] hospital on 06/03/2021 with chest pressure before chemotherapy treatment for prostate cancer  Sent for testing and noted to be in acute CHF exacerbation and was diuresed  Was then transferred to [facility] in [facility]  TTE showed "severely reduced LVEF with LV apical aneurysm and septal/AW AK, RV normal, Vmax at 3 5 (however, it was read as moderate AS and preserved EF in 2019 so likely this is progression of AS in addition to interim AWMI) "  [facility] also done which revealed "diffuse moderate prox and mid disease/ISR, severe OM lesion and total mid LAD "     Transferred to [facility] on 06/13 for TAVR  TAVR completed on 06/14, and patient was discharged home on 06/15/2021  Started on metoprolol succinate this admission (tartrate discontinued)       Ad[facility] to [facility] from 06/22 to 06/25/2021 after presenting with worsening SOB and LE edema for 3 days  WAS without discharge medications from [facility] admission for about 1 week (including Lasix)  NT-proBNP 5157  Cardiology was consulted, TTE was completed, and IV diuresis was started   Reduced LVEF was confirmed and aneurysmal wall motion of apical segments  Transitioned to PO diuretics on day of discharge  Started on Entresto and fit with LifeVest  Lost 10 lbs this admission       07/06/2021: Patient presents for hospital follow-up  Primary complaint today is of dizziness and right LE pain/numbeness and right foot wound  Reports decreased appetite and increased frustration with attempting to adhere to low sodium diet  Did receive "Living With Heart Failure" booklet during admission  Prior to admissions, often would eat canned pastas for his meals  Considering getting Meals of Wheels  Unclear if patient has been taking both tartrate and succinate since discharge  Not wearing LifeVest; dislikes wearing it and left device at home today  Has not been completing daily weights; does not have scale at home  Patient organizes his own meds; VNA in home 2 times weekly  7/16/2021: Patient is here for follow-up  Patient hospitalized 6/17/20July 7-13 due to right lower extremity ischemia  Underwent right femoral endarterectomy on June 9  Furosemide Entresto held prior to surgery due to low blood pressure  Eventually discharged on low-dose losartan 12 5 mg daily due to soft blood pressure  Furosemide 40 mg daily resumed  Patient reports right lower extremity pain and difficulty bearing weight on the right leg  Otherwise denies shortness of breath, chest pain or lightheadedness  9/15/21: Here for follow up  Overall doing good  Mainly complaining of residual numbness/tingling on right leg  No shortness of breath, chest pain, palpitations or lightheadedness  1/25/22: Here for follow up  S/p BIV ICD placement 12/23/21  Reports lightheadedness "all the time"  No feeling faint  Spinning sensation when laying down  Mainly limited by leg pain, not doing much walking  Denied shortness of breath walking from the parking lot to the office and from the waiting area to exam room  4/26/22: Here for follow up   Decreased furosemide to 20mg daily on last visit  4/18/22 Na 141 K 3 9 creatinine 0 82  One episode of vertigo about a week ago  Still with intermittent episodes of lightheadedness  Doing PT twice a week  Still having lower extremity nerve pains      Interval History:   Eating more ice cream  Limited by foot pain      Review of Systems   Constitutional: Negative for chills and fever  HENT: Negative for ear pain and sore throat  Eyes: Negative for pain and visual disturbance  Respiratory: Negative for cough, chest tightness and shortness of breath  Cardiovascular: Negative for chest pain, palpitations and leg swelling  Gastrointestinal: Negative for abdominal distention, abdominal pain and vomiting  Genitourinary: Negative for dysuria and hematuria  Musculoskeletal: Negative for arthralgias and back pain  Skin: Negative for color change and rash  Neurological: Negative for seizures and syncope  All other systems reviewed and are negative  Past Medical History:   Diagnosis Date    Cancer [facility]) 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    Coronary artery disease 2001    S/p stenting to circumflex and RCA    HFrEF (heart failure with reduced ejection fraction) ([facility] [address] ) 06/14/2021    High cholesterol     Prostate cancer (HCC)          No Known Allergies    Current Outpatient Medications:     acetaminophen (TYLENOL) 325 mg tablet, Take 3 tablets (975 mg total) by mouth every 8 (eight) hours, Disp: 90 tablet, Rfl: 0    aspirin 81 mg chewable tablet, Chew 1 tablet (81 mg total) daily, Disp: 30 tablet, Rfl: 0    atorvastatin (LIPITOR) 80 mg tablet, TAKE 1 TABLET BY MOUTH DAILY WITH DINNER, Disp: 90 tablet, Rfl: 1    cetirizine (ZyrTEC) 5 MG tablet, TAKE 1 TABLET (5 MG TOTAL) BY MOUTH DAILY  , Disp: 90 tablet, Rfl: 1    clopidogrel (PLAVIX) 75 mg tablet, Take 1 tablet (75 mg total) by mouth daily, Disp: 90 tablet, Rfl: 1    enzalutamide (Xtandi) 40 mg capsule, Take 4 capsules (160 mg total) by mouth in the morning , Disp: 120 capsule, Rfl: 10   famotidine (PEPCID) 20 mg tablet, TAKE 1 TABLET BY MOUTH EVERY DAY, Disp: 90 tablet, Rfl: 1    ferrous sulfate 325 (65 Fe) mg tablet, TAKE 1 TABLET BY MOUTH TWICE A DAY WITH MEALS, Disp: 180 tablet, Rfl: 1    furosemide (LASIX) 20 mg tablet, TAKE 1 TABLET BY MOUTH EVERY DAY, Disp: 90 tablet, Rfl: 1    gabapentin (NEURONTIN) 100 mg capsule, TAKE 3 CAPSULES BY MOUTH 3 TIMES A DAY, Disp: 180 capsule, Rfl: 2    leuprolide (Eligard) 22 5 mg, 22 5MG/1 SYRINGE UNDER THE SKIN ONE TIME AS DIRECTED EVERY 3 MONTHS, Disp: , Rfl:     loperamide (IMODIUM) 2 mg capsule, Take 1 capsule (2 mg total) by mouth 4 (four) times a day as needed for diarrhea, Disp: 30 capsule, Rfl: 2    losartan (COZAAR) 25 mg tablet, Take 0 5 tablets (12 5 mg total) by mouth daily, Disp: 45 tablet, Rfl: 1    methocarbamol (ROBAXIN) 500 mg tablet, TAKE 1 TABLET BY MOUTH EVERY 6 HOURS, Disp: 30 tablet, Rfl: 0    metoprolol succinate (TOPROL-XL) 25 mg 24 hr tablet, TAKE 1 TABLET BY MOUTH EVERY DAY (Patient taking differently: Take 12 5 mg by mouth daily), Disp: 90 tablet, Rfl: 2    oxyCODONE (ROXICODONE) 10 MG TABS, Take 1 tablet (10 mg total) by mouth 3 (three) times a day as needed (cancer pain) Max Daily Amount: 30 mg, Disp: 90 tablet, Rfl: 0    prochlorperazine (COMPAZINE) 10 mg tablet, Take 1 tablet (10 mg total) by mouth every 6 (six) hours as needed for nausea or vomiting, Disp: 45 tablet, Rfl: 3    abiraterone (ZYTIGA) 250 mg tablet, Take 4 tablets (1,000 mg total) by mouth daily (Patient not taking: No sig reported), Disp: 30 tablet, Rfl: 2    Social History     Socioeconomic History    Marital status:       Spouse name: Not on file    Number of children: 3    Years of education: Not on file    Highest education level: Not on file   Occupational History    Not on file   Tobacco Use    Smoking status: Former Smoker     Years: 20 00     Quit date: 2002     Years since quittin 0    Smokeless tobacco: Never Used   Vaping Use    Vaping Use: Never used   Substance and Sexual Activity    Alcohol use: Not Currently    Drug use: Not on file    Sexual activity: Not on file   Other Topics Concern    Not on file   Social History Narrative    Not on file     Social Determinants of Health     Financial Resource Strain: Not on file   Food Insecurity: Not on file   Transportation Needs: Not on file   Physical Activity: Not on file   Stress: Not on file   Social Connections: Not on file   Intimate Partner Violence: Not on file   Housing Stability: Not on file       No family history on file  Physical Exam:    Vitals:   Vitals:    07/27/22 1024   BP: 118/80   Pulse: 71   SpO2: 98%       Physical Exam  Constitutional:       General: He is not in acute distress  Appearance: Normal appearance  HENT:      Head: Normocephalic and atraumatic  Mouth/Throat:      Mouth: Mucous membranes are moist    Eyes:      General: No scleral icterus  Extraocular Movements: Extraocular movements intact  Cardiovascular:      Rate and Rhythm: Normal rate and regular rhythm  Heart sounds: S1 normal and S2 normal  No murmur heard  No friction rub  No gallop  Comments: Nonelevated JVP  Trace pitting edema bilaterally  Pulmonary:      Breath sounds: Normal breath sounds  Abdominal:      General: There is no distension  Palpations: Abdomen is soft  Tenderness: There is no abdominal tenderness  There is no guarding or rebound  Musculoskeletal:         General: Normal range of motion  Cervical back: Neck supple  Skin:     General: Skin is warm and dry  Capillary Refill: Capillary refill takes less than 2 seconds  Neurological:      General: No focal deficit present  Mental Status: He is alert and oriented to person, place, and time     Psychiatric:         Mood and Affect: Mood normal          Labs & Results:    Lab Results   Component Value Date    SODIUM 140 07/18/2022    K 3 6 07/18/2022     07/18/2022    CO2 26 07/18/2022    BUN 14 07/18/2022    CREATININE 0 74 07/18/2022    GLUC 84 07/18/2022    CALCIUM 8 6 07/18/2022     Lab Results   Component Value Date    WBC 4 14 (L) 07/18/2022    HGB 11 2 (L) 07/18/2022    HCT 33 4 (L) 07/18/2022    MCV 93 07/18/2022     07/18/2022     Lab Results   Component Value Date    NTBNP 4,279 (H) 02/14/2022      Lab Results   Component Value Date    CHOLESTEROL 146 09/30/2021     Lab Results   Component Value Date    HDL 35 (L) 09/30/2021     Lab Results   Component Value Date    TRIG 249 (H) 09/30/2021     No results found for: Jaime    EKG personally reviewed by Lj Ng MD      Counseling / Coordination of Care  Time spent today 25 minutes  Greater than 50% of total time was spent with the patient and / or family counseling and / or coordination of care  We went over current diagnosis, most recent studies and any changes in treatment  Thank you for the opportunity to participate in the care of this patient      Kartik Vásquez MD  ADVANCED HEART FAILURE AND MECHANICAL CIRCULATORY SUPPORT  [facility]

## 2022-07-27 NOTE — PATIENT INSTRUCTIONS
Continue current cardiac medications  2g sodium diet  2L fluid restriction  Daily weights  Physical activities/exercise as tolerated

## 2022-07-29 NOTE — PROGRESS NOTES
Discharge Note  Franchesca Rene has made fair functional progress with physical therapy  he was educated and updated in his home exercise program  Marcela Brown will be discharged from formal therapy due to plateau of progress but will follow up as needed

## 2022-08-01 DIAGNOSIS — T78.40XD ALLERGY, SUBSEQUENT ENCOUNTER: ICD-10-CM

## 2022-08-01 DIAGNOSIS — I50.22 CHRONIC HFREF (HEART FAILURE WITH REDUCED EJECTION FRACTION) (HCC): ICD-10-CM

## 2022-08-01 RX ORDER — CETIRIZINE HYDROCHLORIDE 5 MG/1
5 TABLET ORAL DAILY
Qty: 90 TABLET | Refills: 1 | Status: SHIPPED | OUTPATIENT
Start: 2022-08-01

## 2022-08-01 RX ORDER — METOPROLOL SUCCINATE 25 MG/1
TABLET, EXTENDED RELEASE ORAL
Qty: 90 TABLET | Refills: 2 | Status: SHIPPED | OUTPATIENT
Start: 2022-08-01

## 2022-08-01 RX ORDER — FUROSEMIDE 20 MG/1
TABLET ORAL
Qty: 90 TABLET | Refills: 1 | Status: SHIPPED | OUTPATIENT
Start: 2022-08-01

## 2022-08-01 NOTE — TELEPHONE ENCOUNTER
Royce Rene has requested a refill of cetirizine (ZyrTEC) 5 mg tablet  Pt meets the requirements placed by [facility]  Will refill medication

## 2022-08-08 ENCOUNTER — OFFICE VISIT (OUTPATIENT)
Dept: PALLIATIVE MEDICINE | Facility: CLINIC | Age: 77
End: 2022-08-08
Payer: COMMERCIAL

## 2022-08-08 VITALS
DIASTOLIC BLOOD PRESSURE: 80 MMHG | TEMPERATURE: 96.7 F | BODY MASS INDEX: 31.56 KG/M2 | RESPIRATION RATE: 14 BRPM | HEART RATE: 76 BPM | WEIGHT: 201.5 LBS | OXYGEN SATURATION: 96 % | SYSTOLIC BLOOD PRESSURE: 124 MMHG

## 2022-08-08 DIAGNOSIS — C79.51 MALIGNANT NEOPLASM METASTATIC TO BONE (HCC): Primary | ICD-10-CM

## 2022-08-08 DIAGNOSIS — C61 PROSTATE CANCER (HCC): ICD-10-CM

## 2022-08-08 DIAGNOSIS — G89.29 CHRONIC MIDLINE LOW BACK PAIN: ICD-10-CM

## 2022-08-08 DIAGNOSIS — Z59.9 FINANCIAL PROBLEMS: Chronic | ICD-10-CM

## 2022-08-08 DIAGNOSIS — G62.9 PERIPHERAL NEUROPATHY: ICD-10-CM

## 2022-08-08 DIAGNOSIS — M54.50 CHRONIC MIDLINE LOW BACK PAIN: ICD-10-CM

## 2022-08-08 DIAGNOSIS — G89.3 CANCER RELATED PAIN: ICD-10-CM

## 2022-08-08 DIAGNOSIS — R26.2 AMBULATORY DYSFUNCTION: Primary | Chronic | ICD-10-CM

## 2022-08-08 DIAGNOSIS — I73.9 PAD (PERIPHERAL ARTERY DISEASE) (HCC): ICD-10-CM

## 2022-08-08 DIAGNOSIS — M79.606 ISCHEMIC LEG PAIN: ICD-10-CM

## 2022-08-08 DIAGNOSIS — I99.8 ISCHEMIC LEG PAIN: ICD-10-CM

## 2022-08-08 DIAGNOSIS — Z51.5 PALLIATIVE CARE PATIENT: ICD-10-CM

## 2022-08-08 PROCEDURE — 99214 OFFICE O/P EST MOD 30 MIN: CPT | Performed by: FAMILY MEDICINE

## 2022-08-08 PROCEDURE — 1160F RVW MEDS BY RX/DR IN RCRD: CPT | Performed by: FAMILY MEDICINE

## 2022-08-08 RX ORDER — METHOCARBAMOL 750 MG/1
750 TABLET, FILM COATED ORAL
Qty: 90 TABLET | Refills: 0 | Status: SHIPPED | OUTPATIENT
Start: 2022-08-08 | End: 2022-08-22

## 2022-08-08 RX ORDER — GABAPENTIN 300 MG/1
300 CAPSULE ORAL 2 TIMES DAILY
Qty: 60 CAPSULE | Refills: 0 | Status: SHIPPED | OUTPATIENT
Start: 2022-08-08 | End: 2022-08-08 | Stop reason: SDUPTHER

## 2022-08-08 RX ORDER — OXYCODONE HYDROCHLORIDE 10 MG/1
10 TABLET ORAL 3 TIMES DAILY PRN
Qty: 90 TABLET | Refills: 0 | Status: SHIPPED | OUTPATIENT
Start: 2022-08-08 | End: 2022-09-06 | Stop reason: SDUPTHER

## 2022-08-08 RX ORDER — GABAPENTIN 300 MG/1
600 CAPSULE ORAL 2 TIMES DAILY
Qty: 120 CAPSULE | Refills: 0 | Status: SHIPPED | OUTPATIENT
Start: 2022-08-08 | End: 2022-08-22

## 2022-08-08 RX ORDER — LIDOCAINE AND PRILOCAINE 25; 25 MG/G; MG/G
CREAM TOPICAL AS NEEDED
Qty: 30 G | Refills: 1 | Status: SHIPPED | OUTPATIENT
Start: 2022-08-08

## 2022-08-08 SDOH — ECONOMIC STABILITY - INCOME SECURITY: PROBLEM RELATED TO HOUSING AND ECONOMIC CIRCUMSTANCES, UNSPECIFIED: Z59.9

## 2022-08-08 NOTE — PATIENT INSTRUCTIONS
[employer] for home health care is the program to help pay your family to take care of you  You will need to call the [place] in which you live to request an application for these funds  It likely will take months to get fully approved

## 2022-08-08 NOTE — PROGRESS NOTES
Outpatient Follow-Up - Palliative and Supportive Care   Jaylyn Erich Rene 68 y o  male [id number]    Assessment & Plan  Problem List Items Addressed This Visit     Ambulatory dysfunction - Primary (Chronic)    Relevant Medications    methocarbamol (Robaxin-750) 750 mg tablet    Financial problems (Chronic)    Palliative care patient (Chronic)    Relevant Medications    oxyCODONE (ROXICODONE) 10 MG TABS    Peripheral neuropathy (Chronic)    Relevant Medications    lidocaine-prilocaine (EMLA) cream    Chronic midline low back pain    Relevant Medications    methocarbamol (Robaxin-750) 750 mg tablet    Ischemic leg pain    Relevant Medications    lidocaine-prilocaine (EMLA) cream    gabapentin (NEURONTIN) 300 mg capsule    PAD (peripheral artery disease) (HCC)    Relevant Medications    lidocaine-prilocaine (EMLA) cream    gabapentin (NEURONTIN) 300 mg capsule    Prostate cancer (HCC)    Relevant Medications    oxyCODONE (ROXICODONE) 10 MG TABS      Other Visit Diagnoses     Cancer related pain        Relevant Medications    oxyCODONE (ROXICODONE) 10 MG TABS          Medications adjusted this encounter:  Requested Prescriptions     Signed Prescriptions Disp Refills    oxyCODONE (ROXICODONE) 10 MG TABS 90 tablet 0     Sig: Take 1 tablet (10 mg total) by mouth 3 (three) times a day as needed (cancer pain) Max Daily Amount: 30 mg    lidocaine-prilocaine (EMLA) cream 30 g 1     Sig: Apply topically as needed (nerve pain) In Right leg    gabapentin (NEURONTIN) 300 mg capsule 120 capsule 0     Sig: Take 2 capsules (600 mg total) by mouth 2 (two) times a day To reduce nerve pain in feet   methocarbamol (Robaxin-750) 750 mg tablet 90 tablet 0     Sig: Take 1 tablet (750 mg total) by mouth 3 (three) times a day with meals To reduce back pain and muscle pain     No orders of the defined types were placed in this encounter      Medications Discontinued During This Encounter   Medication Reason    gabapentin (NEURONTIN) 100 mg capsule Reorder    oxyCODONE (ROXICODONE) 10 MG TABS Reorder    gabapentin (NEURONTIN) 300 mg capsule Reorder    methocarbamol (ROBAXIN) 500 mg tablet       - Ongoing close f/up in clinic   - Pt continues to be an ongoing risk for medication misuse:   = blind in one eye   = Limited medical savvy   = Fairly hard of hearing   = Little oversight or support from his family in medication checks  - Adjustment in following meds to improve pain relief, reduce medication errors:   = Robaxin --> 750 TID from 600mg QID   = Gabapentin --> 600mg BID from 300 TID   = No changes to opioids  Christopher Maguire was seen today for symptoms and planning cares related to above illnesses  I have reviewed the patient's controlled substance dispensing history in the Prescription Drug Monitoring Program in compliance with the [facility] regulations before prescribing any controlled substances  They are invited to continue to follow with us  If there are questions or concerns, please contact us through our clinic/answering service 24 hours a day, seven days a week  Alexandre Espinal MD  [facility] Palliative and Supportive Care  [phone]      Visit Information    Accompanied By: Family member    Source of History: Self, Family member    History Limitations: limited health literacy    Contacts: son Cleve Louise - [phone]               Daughter Rei Rebolledo - [phone]    History of Present Illness      Christopher Maguire is a 68 y o  male who presents in follow up of symptoms related to Stage IV prostate cancer, hormone treatment resistant  He follow with Dr Gilbert Figueroa and Ms Mcmanus Friends for this  There is also a significant cardiovascular history, with heart failure management by Dr Gerry Kramer, and PAD with distal pain at rest in both feet  Pertinent issues include: symptom management, disease process education and discussion of prognosis      Since last visit, XR of bilat feet is negative for bony injury    He continues to endorse a sense of fullness and foreign body sensation in the R foot impairing his ambulation  He declines any additional PT at this time -- despite his ambulatory unsteadiness, back pain, and other challenges -- because he is so limited by foot pain  Son getting a job that may not accommodate his father's medical needs  Family interested in [address] [employer] services for son to be carer  Getting Meals on Wheels, but having trouble both paying for additional meals, and standing long enough to do dishes and prep food  He inquires today about food stamps, as she has spent down over half his life savings in order to pay for cares and living expenses  As per our previous experience, he continues to both endorse very limited understanding of his meds, and no willingness to use help from family on this matter  He has not seen PCP in quite some time, and has no desire to return unless very strongly indicated  From our first visit on 3/1:    "Pt has demonstrated -- over a year ago now -- progression of his illness (rising PSA and decreasing functional status) despite aggressive therapies and cytotoxic treatments  His side effects vs decompensation in his overall illness was so severe he was admitted to [address] [place] [address]  in march 2021  Since that time, he has recovered some function with careful management of his cardiac disease, and less aggressive, less toxic cancer cares  At this time, his treatment plan involves Xofigo for bone-avid disease, +/- Lupron  We note that the patient has demonstrated some disease progression on other hormonal blockers, but that this plan of care is being offered, if not rec'd, by Dr Racquel Falcon  Pt presents to our office with suboptimally controlled pain, ongoing wt loss, poor appetite, and general malaise    His concerns are more related to his back and R foot pain; a Podiatrist today advised him that his unilateral neuropathic symptoms 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        Today, he can't quite recall his medications, he just insists that he needs 'something stronger than tynlenol'  When we pointed out that he has used tramadol and oxyIR recently, he also stated that these medications were not helpful  Pt endorses support from his son, who will drive pt to appts and on other errands  Son does not help with meds  Pt can't name his meds, but has some vague understanding of the indications for his meds "    Past medical, surgical, social, and family histories are reviewed and pertinent updates are made  Review of Systems   Constitutional: Negative for decreased appetite, weight gain and weight loss  HENT: Negative for hoarse voice and nosebleeds  Eyes: Negative for vision loss in left eye and vision loss in right eye  Cardiovascular: Negative for chest pain and dyspnea on exertion  Respiratory: Negative for cough and shortness of breath  Endocrine: Negative for polydipsia, polyphagia and polyuria  Skin: Negative for flushing and itching  Musculoskeletal: Positive for back pain, joint pain and muscle weakness  Negative for falls  Gastrointestinal: Negative for anorexia, jaundice, nausea and vomiting  Genitourinary: Negative for frequency  Neurological: Positive for numbness, paresthesias, sensory change and weakness  Negative for dizziness  Psychiatric/Behavioral: Negative for depression and memory loss  The patient is nervous/anxious  Vital Signs    /80 (BP Location: Right arm, Patient Position: Sitting, Cuff Size: Standard)   Pulse 76   Temp (!) 96 7 °F (35 9 °C) (Temporal)   Resp 14   Wt 91 4 kg (201 lb 8 oz)   SpO2 96%   BMI 31 56 kg/m²     Physical Exam and Objective Data  Physical Exam  Constitutional:       General: He is not in acute distress  Appearance: He is obese  He is ill-appearing  He is not toxic-appearing or diaphoretic  Comments: Frail   HENT:      Head: Normocephalic and atraumatic        Right Ear: External ear normal       Left Ear: External ear normal    Eyes:      General:         Right eye: No discharge  Left eye: No discharge  Conjunctiva/sclera: Conjunctivae normal       Pupils: Pupils are equal, round, and reactive to light  Neck:      Trachea: No tracheal deviation  Cardiovascular:      Rate and Rhythm: Normal rate and regular rhythm  Pulmonary:      Effort: Pulmonary effort is normal  No respiratory distress  Breath sounds: No stridor  Abdominal:      Palpations: Abdomen is soft  Comments: scaphoid   Skin:     General: Skin is warm and dry  Coloration: Skin is pale  Findings: No erythema or rash  Neurological:      General: No focal deficit present  Mental Status: He is alert and oriented to person, place, and time  Mental status is at baseline  Comments: Limited command of medical details  Spells medications letter by letter rather than naming them  Refers to his AVS as his list of meds   Psychiatric:      Comments: Insight limited  Judgment limited  Radiology and Laboratory:  I personally reviewed and interpreted the following results: none new    25+ minutes was spent face to face with Royce Rene  with greater than 50% of the time spent in counseling or coordination of care including: chart review; symptom pursuit; supportive listening; anticipatory guidance  All of the patient's or agent's questions were answered during this discussion

## 2022-08-08 NOTE — Clinical Note
Teammates, pls note that patient still has challenges with neuropathy, which may be related to blood flow more than cancer cares  More importantly, he is experiencing worsening financial struggles, and may need assistance from [employer] and financial counselor  He would likely benefit from [employer]  If either PCP office or Cancer Care service line as a  to devote to him, this would be best   I no longer have a SW support teammate at my office in [facility]

## 2022-08-12 ENCOUNTER — PATIENT OUTREACH (OUTPATIENT)
Dept: CASE MANAGEMENT | Facility: HOSPITAL | Age: 77
End: 2022-08-12

## 2022-08-13 NOTE — PROGRESS NOTES
MSW receive referral and chart reviewed  MSW will  call to initiate initial assessment and DT screening

## 2022-08-15 ENCOUNTER — HOSPITAL ENCOUNTER (OUTPATIENT)
Dept: INFUSION CENTER | Facility: CLINIC | Age: 77
Discharge: HOME | End: 2022-08-15
Payer: COMMERCIAL

## 2022-08-15 DIAGNOSIS — C79.51 MALIGNANT NEOPLASM METASTATIC TO BONE (HCC): ICD-10-CM

## 2022-08-15 DIAGNOSIS — Z95.828 PORT-A-CATH IN PLACE: ICD-10-CM

## 2022-08-15 DIAGNOSIS — C61 PROSTATE CANCER (HCC): Primary | ICD-10-CM

## 2022-08-15 LAB
ALBUMIN SERPL BCP-MCNC: 4 G/DL (ref 3.5–5)
ALP SERPL-CCNC: 88 U/L (ref 34–104)
ALT SERPL W P-5'-P-CCNC: 10 U/L (ref 7–52)
ANION GAP SERPL CALCULATED.3IONS-SCNC: 9 MMOL/L (ref 4–13)
AST SERPL W P-5'-P-CCNC: 13 U/L (ref 13–39)
BASOPHILS # BLD AUTO: 0.03 THOUSANDS/ΜL (ref 0–0.1)
BASOPHILS NFR BLD AUTO: 1 % (ref 0–1)
BILIRUB SERPL-MCNC: 0.76 MG/DL (ref 0.2–1)
BUN SERPL-MCNC: 16 MG/DL (ref 5–25)
CALCIUM SERPL-MCNC: 8.7 MG/DL (ref 8.4–10.2)
CHLORIDE SERPL-SCNC: 108 MMOL/L (ref 96–108)
CO2 SERPL-SCNC: 23 MMOL/L (ref 21–32)
CREAT SERPL-MCNC: 0.74 MG/DL (ref 0.6–1.3)
EOSINOPHIL # BLD AUTO: 0.16 THOUSAND/ΜL (ref 0–0.61)
EOSINOPHIL NFR BLD AUTO: 3 % (ref 0–6)
ERYTHROCYTE [DISTWIDTH] IN BLOOD BY AUTOMATED COUNT: 12.7 % (ref 11.6–15.1)
GFR SERPL CREATININE-BSD FRML MDRD: 89 ML/MIN/1.73SQ M
GLUCOSE SERPL-MCNC: 88 MG/DL (ref 65–140)
HCT VFR BLD AUTO: 34.9 % (ref 36.5–49.3)
HGB BLD-MCNC: 11.3 G/DL (ref 12–17)
IMM GRANULOCYTES # BLD AUTO: 0.05 THOUSAND/UL (ref 0–0.2)
IMM GRANULOCYTES NFR BLD AUTO: 1 % (ref 0–2)
LYMPHOCYTES # BLD AUTO: 1.16 THOUSANDS/ΜL (ref 0.6–4.47)
LYMPHOCYTES NFR BLD AUTO: 18 % (ref 14–44)
MCH RBC QN AUTO: 29.7 PG (ref 26.8–34.3)
MCHC RBC AUTO-ENTMCNC: 32.4 G/DL (ref 31.4–37.4)
MCV RBC AUTO: 92 FL (ref 82–98)
MONOCYTES # BLD AUTO: 0.58 THOUSAND/ΜL (ref 0.17–1.22)
MONOCYTES NFR BLD AUTO: 9 % (ref 4–12)
NEUTROPHILS # BLD AUTO: 4.4 THOUSANDS/ΜL (ref 1.85–7.62)
NEUTS SEG NFR BLD AUTO: 68 % (ref 43–75)
NRBC BLD AUTO-RTO: 0 /100 WBCS
PLATELET # BLD AUTO: 188 THOUSANDS/UL (ref 149–390)
PMV BLD AUTO: 8.9 FL (ref 8.9–12.7)
POTASSIUM SERPL-SCNC: 3.8 MMOL/L (ref 3.5–5.3)
PROT SERPL-MCNC: 6.1 G/DL (ref 6.4–8.4)
PSA SERPL-MCNC: 30.2 NG/ML (ref 0–4)
RBC # BLD AUTO: 3.81 MILLION/UL (ref 3.88–5.62)
SODIUM SERPL-SCNC: 140 MMOL/L (ref 135–147)
WBC # BLD AUTO: 6.38 THOUSAND/UL (ref 4.31–10.16)

## 2022-08-15 PROCEDURE — 85025 COMPLETE CBC W/AUTO DIFF WBC: CPT

## 2022-08-15 PROCEDURE — 80053 COMPREHEN METABOLIC PANEL: CPT

## 2022-08-15 PROCEDURE — 84153 ASSAY OF PSA TOTAL: CPT

## 2022-08-15 NOTE — PROGRESS NOTES
Patient arrives to infusion center for lab draw today  R PAC accessed without issue, brisk blood return noted, labs drawn and sent as per orders  Port flushed easily without resistance, deaccessed, bandaid in place  Patient aware to schedule next lab draw/port flush appointment  Patient aware next Lupron injection not due until October  Patient AAOx4 and ambulatory with cane upon DC, will make next appt with  staff

## 2022-08-17 ENCOUNTER — TELEPHONE (OUTPATIENT)
Dept: HEMATOLOGY ONCOLOGY | Facility: CLINIC | Age: 77
End: 2022-08-17

## 2022-08-17 ENCOUNTER — OFFICE VISIT (OUTPATIENT)
Dept: HEMATOLOGY ONCOLOGY | Facility: CLINIC | Age: 77
End: 2022-08-17
Payer: COMMERCIAL

## 2022-08-17 ENCOUNTER — REMOTE DEVICE CLINIC VISIT (OUTPATIENT)
Dept: CARDIOLOGY CLINIC | Facility: CLINIC | Age: 77
End: 2022-08-17

## 2022-08-17 VITALS
OXYGEN SATURATION: 98 % | TEMPERATURE: 97.8 F | SYSTOLIC BLOOD PRESSURE: 120 MMHG | WEIGHT: 202 LBS | BODY MASS INDEX: 31.71 KG/M2 | HEIGHT: 67 IN | DIASTOLIC BLOOD PRESSURE: 76 MMHG | RESPIRATION RATE: 14 BRPM | HEART RATE: 74 BPM

## 2022-08-17 DIAGNOSIS — C61 PROSTATE CANCER (HCC): Primary | ICD-10-CM

## 2022-08-17 DIAGNOSIS — Z95.810 BIVENTRICULAR ICD (IMPLANTABLE CARDIOVERTER-DEFIBRILLATOR) IN PLACE: Primary | ICD-10-CM

## 2022-08-17 DIAGNOSIS — C79.51 MALIGNANT NEOPLASM METASTATIC TO BONE (HCC): ICD-10-CM

## 2022-08-17 PROCEDURE — RECHECK: Performed by: INTERNAL MEDICINE

## 2022-08-17 PROCEDURE — 99214 OFFICE O/P EST MOD 30 MIN: CPT | Performed by: INTERNAL MEDICINE

## 2022-08-17 NOTE — TELEPHONE ENCOUNTER
I only see Lupron to be scheduled  Patient has an appt for Lupron in October  Will you schedule his next one when he comes in for his October tx?

## 2022-08-17 NOTE — TELEPHONE ENCOUNTER
Patient is already scheduled for October  We can schedule another appointment with patient when he comes in for October appt

## 2022-08-17 NOTE — TELEPHONE ENCOUNTER
While we try to accommodate patient requests, our priority is to schedule treatment according to Doctor's orders and site availability  1  Does the Provider use the intake sheet or checkout note? No  2  What would be a preferred day of the week that would work best for your infusion appointment? No preference  3  Do you prefer mornings or afternoons for your appointments? AM (around 9am)  4  Are there any days or dates that do not work for your schedule, including any upcoming vacations? No  5  We are going to try our best to schedule you at the infusion center closest to your home  In the event that we are unable to what would be your next preferred infusion site or sites? 1  [facility]  2  [facility]  3  [facility]    6  Do you have transportation to take you to all of your appointments? Yes  7  Would you like the infusion center to draw labs from your port?  Yes (disregard if patient doesn't have a port or need labs for infusion appointment)

## 2022-08-17 NOTE — TELEPHONE ENCOUNTER
Spoke with patient to let him know that he will be scheduled for his next Lupron when he comes in for his October treatment  Patient is aware

## 2022-08-17 NOTE — PROGRESS NOTES
Results for orders placed or performed in visit on 08/17/22   Cardiac EP device report    Narrative    S[facility] BI-V ICD -ACTIVE SYSTEM IS MRI CONDITIONAL  N/B; MERLIN TRANSMISSION- 1 MO EPISODE CK : BATTERY VOLTAGE ADEQUATE (6 2 YRS)  AP 60% BVP 98% (DDDR 70); ALL AVAILABLE LEAD PARAMETERS WITHIN NORMAL LIMITS  1 AMS EPISODE, 10 SEC  DURATION WITH PAT ON EGM STORED  AT/AF BURDEN <1%  PATIENT TAKES ASA 81, CLOPIDOGREL, METOPROLOL SUCC  CORVUE IMPEDANCE MONITORING WITHIN NORMAL LIMITS  NORMAL DEVICE FUNCTION    ES

## 2022-08-17 NOTE — PROGRESS NOTES
Hematology/Oncology Outpatient Follow- up Note  Jaylyn Rene 68 y o  male MRN: @ Encounter: [id number]        Date:  8/17/2022    Presenting Complaint/Diagnosis :     Metastatic castration resistant prostate cancer     HPI:      Carlos Sales seen for initial consultation 10/5/2021 regarding  A history of metastatic castration resistant prostate cancer   The patient was diagnosed with a [place] 7 prostate cancer treated with external beam radiation from October of 2003 to December of 2003  [facility] also got neoadjuvant and adjuvant [facility] for 9 months after radiation  [facility] was observed until July of 2008 when he was restarted on Zoladex in later Lupron   PSA was going up and imaging showed new bony lesions at that time  [facility] was started on are better own in March of 2015 and his scan in June of 2017 showed progression so was started on enzalutamide   His PSA continued to rise and he had progressive bony metastases including to the sacrum for which he received 3000 cGy of radiation in 10 fractions in February of 2019  He was then restarted on enzalutamide but a scan in December of 2019 showed worsening bone metastases so he was started on docetaxel in March of 2020  He was kept on this for approximately a year with his PSA slowly rising near the end per the notes   After March he states he was admitted to a hospital for cardiac issues and then never went back for either chemotherapy or his Lupron       Previous Hematologic/ Oncologic History:      As above  Xofigo       Current Hematologic/ Oncologic Treatment:      Lupron every 3 months  Vonzell Aus  We will add on Xgeva    Interval History:      Patient returns for follow-up visit  His most recent PSA is 30 2  In May of 2022 it had gone up to 32 5  We started him I Vonzell Aus in a came down to 22 2 in July and is now 30 2  He does seem to have had some response in his PSA I starting the new medication  The rest of his blood work is within acceptable limits    PSMA scan shows small focus of radiotracer uptake at the prostate suspicious for residual disease more conspicuous from the prior Axumin exam with innumerable osseous metastatic lesions with progression from prior Axumin PET-CT scan  There was a small intracranial focus of uptake at midline which was nonspecific  This was not correlated on CT  Could be a meningioma according to the report  MRI could be considered  At this point considering silvia-psma is not available so we will continue him on his current regimen as he only has bone only disease  He will stay on the Lupron  He is also a candidate for Xgeva  We will add this on every 3 months  Denies any pain  The rest of his 14 point review of systems today was negative  He is taking calcium twice a day and takes his Xtandi in the mornings  Test Results:    Imaging: X-ray Foot 3+ view right    Result Date: 7/22/2022  Narrative: RIGHT FOOT INDICATION:   C61: Malignant neoplasm of prostate  COMPARISON:  None VIEWS:  XR FOOT 3+ VW RIGHT Images: 3 FINDINGS: No acute displaced fracture or dislocation seen  Small plantar and retrocalcaneal enthesophytes are present  No focal lytic or blastic lesion identified  The Lisfranc alignment is maintained  There are extensive vascular calcifications  Soft tissues  appear unremarkable  Impression: No acute osseous abnormality  Small plantar and retrocalcaneal enthesophytes  Extensive vascular calcifications  Workstation performed: [id number]     NM PET CT skull base to mid thigh    Result Date: 7/26/2022  Narrative: PYLARIFY PSMA PET/CT SCAN INDICATION: Restaging of prostate cancer  Rising PSA  C61: Malignant neoplasm of prostate C79 51: Secondary malignant neoplasm of bone MODIFIER: PS COMPARISON: Axumin PET/CT 10/20/2021, CT lumbar spine 2/14/2022 CELL TYPE:  Prostate adenocarcinoma TECHNIQUE:   9 4 mCi Pylarify PSMA administered IV   Multiplanar attenuation corrected and non attenuation corrected PET images were acquired 60 minutes post injection  Contiguous, low dose, axial CT sections were obtained from the vertex through the femurs   Intravenous or oral contrast was not utilized  This examination, like all CT scans performed in the [facility], was performed utilizing techniques to minimize radiation dose exposure, including the use of iterative reconstruction and automated exposure control  FINDINGS: VISUALIZED BRAIN:   Small focus radiotracer uptake centrally at midline, SUV max of 1 1  No obvious findings on limited CT  May be along the falx  See image 19 series 1200  Uptake is new from the prior Axumin scan  HEAD/NECK:   There is a physiologic distribution of the radiotracer  No PSMA avid cervical adenopathy is seen  CT images: Unremarkable  CHEST:   No PSMA avid soft tissue lesions are seen  CT images: Left-sided AICD/pacemaker  Prior aortic valve replacement  Heart is enlarged  Extensive coronary artery calcifications  Small pericardial effusion, stable  Right-sided Mediport line tip terminates at the SVC  ABDOMEN:   No PSMA avid soft tissue lesions are seen  CT images: Multiple calcified splenic granulomata  Mild-to-moderate fecal retention in the colon  PELVIS: Small somewhat lobular focus of radiotracer uptake at the prostate centrally and slightly to the left, SUV max of 9 0  Previously minimal uptake at the prostate, SUV max of 2 4  No PSMA avid lymph nodes  CT images: No additional significant findings  OSSEOUS STRUCTURES: Innumerable osseous metastatic lesions with progression from prior Axumin PET/CT  Many of these are sclerotic on CT  Reference lesions as noted below: *  T1 transverse process on the left demonstrates SUV max of 25 1, new  *  Right superior scapular focus demonstrates SUV max of 8 7, new  *  T2 vertebral body demonstrates SUV max of 12 6, previously 5 3  *  Left lateral 6th rib demonstrates SUV max of 9 6, new from prior PET/CT   *  The T11 vertebral body demonstrates SUV max 20 2, previously 4 6 cm on the prior PET/CT  *  Right sacral lesion demonstrates SUV max of 9 6, previously 3 3  *  Right anterior iliac lesion demonstrates SUV max of 14 4, previously 2 9 on prior PET/CT  *  Right acetabular lesion demonstrates SUV max 19 7, previously 5 9 on the prior scan  *  Right ischial tuberosity lesion demonstrates SUV max of 9 6, new from prior PET/CT  CT images: Scattered degenerative spurring of the spine  Impression: 1  Small focus of radiotracer uptake at the prostate suspicious for residual disease, more conspicuous from the prior Axumin exam  2   Innumerable osseous metastatic lesions with progression from prior Axumin PET/CT  3   Small intracranial focus of uptake at midline, nonspecific  No obvious findings on limited CT  This could be related to an incidental lesion such as meningioma along the falx, which can demonstrate PSMA activity, rather than metastasis  This could  be further evaluated with dedicated MRI of the brain with and without IV contrast or reassessed on follow up PSMA PET/CT  The study was marked in EPIC for significant notification  Workstation performed: [id number]       Labs:   Lab Results   Component Value Date    WBC 6 38 08/15/2022    HGB 11 3 (L) 08/15/2022    HCT 34 9 (L) 08/15/2022    MCV 92 08/15/2022     08/15/2022     Lab Results   Component Value Date    K 3 8 08/15/2022     08/15/2022    CO2 23 08/15/2022    BUN 16 08/15/2022    CREATININE 0 74 08/15/2022    GLUF 96 12/23/2021    CALCIUM 8 7 08/15/2022    CORRECTEDCA 9 2 06/23/2021    AST 13 08/15/2022    ALT 10 08/15/2022    ALKPHOS 88 08/15/2022    EGFR 89 08/15/2022       Lab Results   Component Value Date    PSA 30 2 (H) 08/15/2022    PSA 22 2 (H) 07/18/2022    PSA 32 5 (H) 05/11/2022   ROS: As stated in the history of present illness otherwise his 14 point review of systems today was negative        Active Problems:   Patient Active Problem List   Diagnosis    Chronic combined systolic and diastolic CHF (congestive heart failure) ([facility])    S/P AVR    Anemia    Chronic midline low back pain    Ischemic cardiomyopathy    Ischemic leg pain    Prostate cancer (HCC)    CAD (coronary artery disease)    Fever    Hypotension    Lower extremity pain    Urinary retention    PAD (peripheral artery disease) ([facility] )    Port-A-Cath in place    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)    Embolism and 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 ([facility])    Depression, recurrent ([place] [address] )    Weight loss    Muscular deconditioning    Cancer-related pain    Palliative care patient    Ambulatory dysfunction    Peripheral neuropathy    Financial problems       Past Medical History:   Past Medical History:   Diagnosis Date    Cancer ([place] [address] ) 2002    tailbone    Coronary artery disease     S/p stenting to circumflex and RCA    HFrEF (heart failure with reduced ejection fraction) ([place] [address] ) 2021    High cholesterol     Prostate cancer ([place] [address] )        Surgical History:   Past Surgical History:   Procedure Laterality Date    CARDIAC ELECTROPHYSIOLOGY PROCEDURE N/A 2021    Procedure: Implant ICD - bi ventricular;  Surgeon: Marquez Garduno MD;  [facility]: [facility] CARD[facility]AC [facility]; Service: Cardiology    CARDIAC SURGERY      VT THROMBOENDARTECTMY FEMORAL COMMON Right 2021    Procedure: ENDARTERECTOMY ARTERIAL FEMORAL;  Surgeon: Eve Coronado DO;  Location: [facility];  Service: Vascular       Family History:  No family history on file  Cancer-related family history is not on file  Social History:   Social History     Socioeconomic History    Marital status:       Spouse name: Not on file    Number of children: 3    Years of education: Not on file    Highest education level: Not on file   Occupational History    Not on file   Tobacco Use    Smoking status: Former Smoker     Years: 20      Quit date: 2002     Years since quittin 1  Smokeless tobacco: Never Used   Vaping Use    Vaping Use: Never used   Substance and Sexual Activity    Alcohol use: Not Currently    Drug use: Not on file    Sexual activity: Not on file   Other Topics Concern    Not on file   Social History Narrative    Not on file     Social Determinants of Health     Financial Resource Strain: Not on file   Food Insecurity: Not on file   Transportation Needs: Not on file   Physical Activity: Not on file   Stress: Not on file   Social Connections: Not on file   Intimate Partner Violence: Not on file   Housing Stability: Not on file       Current Medications:   Current Outpatient Medications   Medication Sig Dispense Refill    acetaminophen (TYLENOL) 325 mg tablet Take 3 tablets (975 mg total) by mouth every 8 (eight) hours 90 tablet 0    aspirin 81 mg chewable tablet Chew 1 tablet (81 mg total) daily 30 tablet 0    atorvastatin (LIPITOR) 80 mg tablet TAKE 1 TABLET BY MOUTH DAILY WITH DINNER 90 tablet 1    cetirizine (ZyrTEC) 5 MG tablet TAKE 1 TABLET (5 MG TOTAL) BY MOUTH DAILY  90 tablet 1    clopidogrel (PLAVIX) 75 mg tablet Take 1 tablet (75 mg total) by mouth daily 90 tablet 1    enzalutamide (Xtandi) 40 mg capsule Take 4 capsules (160 mg total) by mouth in the morning  120 capsule 10    famotidine (PEPCID) 20 mg tablet TAKE 1 TABLET BY MOUTH EVERY DAY 90 tablet 1    ferrous sulfate 325 (65 Fe) mg tablet TAKE 1 TABLET BY MOUTH TWICE A DAY WITH MEALS 180 tablet 1    furosemide (LASIX) 20 mg tablet TAKE 1 TABLET BY MOUTH EVERY DAY 90 tablet 1    gabapentin (NEURONTIN) 300 mg capsule Take 2 capsules (600 mg total) by mouth 2 (two) times a day To reduce nerve pain in feet  120 capsule 0    leuprolide (Eligard) 22 5 mg 22  5MG/1 SYRINGE UNDER THE SKIN ONE TIME AS DIRECTED EVERY 3 MONTHS      lidocaine-prilocaine (EMLA) cream Apply topically as needed (nerve pain) In Right leg 30 g 1    loperamide (IMODIUM) 2 mg capsule Take 1 capsule (2 mg total) by mouth 4 (four) times a day as needed for diarrhea 30 capsule 2    losartan (COZAAR) 25 mg tablet Take 0 5 tablets (12 5 mg total) by mouth daily 45 tablet 1    methocarbamol (Robaxin-750) 750 mg tablet Take 1 tablet (750 mg total) by mouth 3 (three) times a day with meals To reduce back pain and muscle pain 90 tablet 0    metoprolol succinate (TOPROL-XL) 25 mg 24 hr tablet TAKE 1 TABLET BY MOUTH EVERY DAY 90 tablet 2    oxyCODONE (ROXICODONE) 10 MG TABS Take 1 tablet (10 mg total) by mouth 3 (three) times a day as needed (cancer pain) Max Daily Amount: 30 mg 90 tablet 0    prochlorperazine (COMPAZINE) 10 mg tablet Take 1 tablet (10 mg total) by mouth every 6 (six) hours as needed for nausea or vomiting 45 tablet 3    abiraterone (ZYTIGA) 250 mg tablet Take 4 tablets (1,000 mg total) by mouth daily (Patient not taking: No sig reported) 30 tablet 2     No current facility-administered medications for this visit  Allergies: No Known Allergies    Physical Exam:    Body surface area is 2 03 meters squared  Wt Readings from Last 3 Encounters:   08/17/22 91 6 kg (202 lb)   08/08/22 91 4 kg (201 lb 8 oz)   07/27/22 92 3 kg (203 lb 8 oz)        Temp Readings from Last 3 Encounters:   08/17/22 97 8 °F (36 6 °C) (Temporal)   08/08/22 (!) 96 7 °F (35 9 °C) (Temporal)   07/07/22 (!) 96 8 °F (36 °C) (Temporal)        BP Readings from Last 3 Encounters:   08/17/22 120/76   08/08/22 124/80   07/27/22 118/80         Pulse Readings from Last 3 Encounters:   08/17/22 74   08/08/22 76   07/27/22 71         Physical Exam     Constitutional   General appearance: No acute distress, well appearing and well nourished  Eyes   Conjunctiva and lids: No swelling, erythema or discharge  Pupils and irises: Equal, round and reactive to light  Ears, Nose, Mouth, and Throat   External inspection of ears and nose: Normal     Nasal mucosa, septum, and turbinates: Normal without edema or erythema      Oropharynx: Normal with no erythema, edema, exudate or lesions  has no teeth  No lesions were seen in his gums  Pulmonary   Respiratory effort: No increased work of breathing or signs of respiratory distress  Auscultation of lungs: Clear to auscultation  Cardiovascular   Palpation of heart: Normal PMI, no thrills  Auscultation of heart: Normal rate and rhythm, normal S1 and S2, without murmurs  Examination of extremities for edema and/or varicosities: Normal     Carotid pulses: Normal     Abdomen   Abdomen: Non-tender, no masses  Liver and spleen: No hepatomegaly or splenomegaly  Lymphatic   Palpation of lymph nodes in neck: No lymphadenopathy  Musculoskeletal   Gait and station:   Walks with a cane  Digits and nails: Normal without clubbing or cyanosis  Inspection/palpation of joints, bones, and muscles: Normal     Skin   Skin and subcutaneous tissue: Normal without rashes or lesions  Neurologic   Cranial nerves: Cranial nerves 2-12 intact  Sensation: No sensory loss  Psychiatric   Orientation to person, place, and time: Normal     Mood and affect: Normal         Assessment / Plan: This is an unfortunate 27-year-old male with a past medical history of [place] 7 prostate cancer with what appears to be predominantly bony metastatic disease who has been on docetaxel and Lupron recently both of which he states were stopped in March when he got admitted to an outside hospital for cardiac issues   Per the notes I do have it appeared he was progressing somewhat on docetaxel with a rising PSA  [facility] has had radiation in the past to symptomatic bone metastases  We referred him for [facility] since he had progressed on docetaxel and Zytiga  He finished this up and we restarted him on Xtandi  He initially had a response where his PSA dropped and is now in the 30 range  We will continue him on this  As soon as we have LUPSMA   we will consider him for this as he does have PSMA avid disease  The patient agrees with this    I will add on Xgeva to his Lupron  This will be every 3 months  He will take Xtandi daily  He will take Imodium as needed for diarrhea  I will see him back in 3 months  He will get blood work repeated in 3 months  ECOG performance status is a 1-2  He is not a candidate for chemotherapy at this point as I do not think he will tolerate this well  If needed we can readdress this if his PSA continues to go up  Goals and Barriers:  Current Goal:  Prolong Survival from prostate cancer with bony metastases     Barriers: None  Patient's Capacity to Self Care:  Patient able to self care  Portions of the record may have been created with voice recognition software  Occasional wrong word or "sound a like" substitutions may have occurred due to the inherent limitations of voice recognition software  Read the chart carefully and recognize, using context, where substitutions have occurred

## 2022-08-22 DIAGNOSIS — R26.2 AMBULATORY DYSFUNCTION: Chronic | ICD-10-CM

## 2022-08-22 DIAGNOSIS — I73.9 PAD (PERIPHERAL ARTERY DISEASE) (HCC): ICD-10-CM

## 2022-08-22 DIAGNOSIS — G89.29 CHRONIC MIDLINE LOW BACK PAIN: ICD-10-CM

## 2022-08-22 DIAGNOSIS — M54.50 CHRONIC MIDLINE LOW BACK PAIN: ICD-10-CM

## 2022-08-22 RX ORDER — GABAPENTIN 300 MG/1
600 CAPSULE ORAL 2 TIMES DAILY
Qty: 120 CAPSULE | Refills: 0 | Status: SHIPPED | OUTPATIENT
Start: 2022-08-22 | End: 2022-09-06 | Stop reason: SDUPTHER

## 2022-08-22 RX ORDER — METHOCARBAMOL 750 MG/1
750 TABLET, FILM COATED ORAL
Qty: 90 TABLET | Refills: 0 | Status: SHIPPED | OUTPATIENT
Start: 2022-08-22 | End: 2022-09-06 | Stop reason: SDUPTHER

## 2022-08-24 ENCOUNTER — PATIENT OUTREACH (OUTPATIENT)
Dept: CASE MANAGEMENT | Facility: HOSPITAL | Age: 77
End: 2022-08-24

## 2022-08-24 NOTE — PROGRESS NOTES
Biopsychosocial and Barriers Assessment    Cancer Diagnosis: Prostate Cancer   Home/Cell Phone: [phone]   Emergency Contact: son Jf Bernard lives with patient   Marital Status:  4-5 years   Interpretation concerns, speaks another language, preferred language: English  Cultural concerns: no   Ability to read or write: yes     Caregiver/Support: zackary suarez comes once and a while son in [place] comes once in a while  Nieces and nephews  Children: Had three children  Now have two polly  and moriah  Child/Elder care: no     Housing: rent the downstairs out  Live on the second floor   Home Setup: twenty stairs   Lives With: marco matias  Daily Living Activities: has a bad foot   Durable Medical Equipment: walker, cane, sits on office chair on wheels and push self around  Can't walk to far  Back starts hurting from cancer in back   Ambulation: independent with supervision    Preferred Pharmacy: [facility]  High co-pays with insurance: no   High co-pays with medication coverage: no   No medication coverage: no     Primary Care Provider: Rhea Juarez MD  Hx of Home Health Care: yes   Hx of Short term rehab: no   Mental Health Hx: no   Substance Abuse Hx: yes used to drink 2003  Employment: retired last year  [employer] Status/Location: yes/ army in 63-66  Ability to pay bills: yes  Educated on cancer compassion funds  POA/LW/AD: Moriah and marco Fall Plan/Concerns:  Drives sometimes when feeling ok  If unable to drive [employer]  What do you know about your Cancer Diagnosis    What has your doctor told you about your cancer diagnosis:  Prostate Cancer in the bones  What has your doctor told you about your cancer treatment: taking pill and injection  completed chemo and radiation         What specific concerns do you have about your diagnosis and treatment: no concerns    Have you been made aware of any hair loss associated with treatment: no    Additional Comments:    MSW spoke to patient  Patient states his support is mostly from his son-Gavin who lives with him  Pt states Casie Trinidad is very supportive as well as his dtrBerjaspal Ty  Patient states he does not leave the house unless he has to  Pt states he often has diarhea and is afraid of going in his pants while he is out  Pt states he has back pain due to cancer in his back  MSW provided emotional support  Patient other needs at this time  Patient is agreeable to MSW's follow up  MSW will continue to follow up

## 2022-08-27 DIAGNOSIS — K59.1 FUNCTIONAL DIARRHEA: ICD-10-CM

## 2022-08-29 RX ORDER — LOPERAMIDE HYDROCHLORIDE 2 MG/1
CAPSULE ORAL
Qty: 30 CAPSULE | Refills: 2 | Status: SHIPPED | OUTPATIENT
Start: 2022-08-29

## 2022-08-29 NOTE — TELEPHONE ENCOUNTER
Royce Rene has requested a refill of loperamide (IMODIUM) 2 mg capsule  Would a refill be appropriate?

## 2022-08-30 ENCOUNTER — TELEPHONE (OUTPATIENT)
Dept: PALLIATIVE MEDICINE | Age: 77
End: 2022-08-30

## 2022-08-30 ENCOUNTER — TELEPHONE (OUTPATIENT)
Dept: INTERNAL MEDICINE CLINIC | Facility: CLINIC | Age: 77
End: 2022-08-30

## 2022-08-30 NOTE — TELEPHONE ENCOUNTER
Phone call to [facility] regarding insurance referral  They stated to call patient's PCP or Oncologist office  Phone call to patient's primary care physician Dr Erin Montes to get a [facility] insurance referral started     Spoke to Adina Asencio and gave Palliative Care telephone number if they should need any additional information

## 2022-08-30 NOTE — TELEPHONE ENCOUNTER
Radha Harrison from [address] [place] [address] has called the  OS office in regards to [facility]  Radha Harrison stated [facility] has Mervat Cali and a referral is needed for [facility] to be seen

## 2022-09-01 NOTE — TELEPHONE ENCOUNTER
Phone call to PCP spoke to [place] to check on status of [place] referral  He stated he was working with the doctor on it  Informed [place] patient has an appt 9/6/2022 with Palliative Care

## 2022-09-06 ENCOUNTER — OFFICE VISIT (OUTPATIENT)
Dept: PALLIATIVE MEDICINE | Facility: CLINIC | Age: 77
End: 2022-09-06
Payer: OTHER GOVERNMENT

## 2022-09-06 ENCOUNTER — PATIENT OUTREACH (OUTPATIENT)
Dept: CASE MANAGEMENT | Facility: HOSPITAL | Age: 77
End: 2022-09-06

## 2022-09-06 VITALS
DIASTOLIC BLOOD PRESSURE: 62 MMHG | WEIGHT: 196.87 LBS | BODY MASS INDEX: 30.83 KG/M2 | TEMPERATURE: 97.3 F | SYSTOLIC BLOOD PRESSURE: 100 MMHG | HEART RATE: 87 BPM | OXYGEN SATURATION: 99 %

## 2022-09-06 DIAGNOSIS — G89.3 CANCER RELATED PAIN: ICD-10-CM

## 2022-09-06 DIAGNOSIS — I73.9 PAD (PERIPHERAL ARTERY DISEASE) (HCC): ICD-10-CM

## 2022-09-06 DIAGNOSIS — C61 PROSTATE CANCER (HCC): ICD-10-CM

## 2022-09-06 DIAGNOSIS — G89.29 CHRONIC MIDLINE LOW BACK PAIN: ICD-10-CM

## 2022-09-06 DIAGNOSIS — Z78.9 DIFFICULTY DEMONSTRATING HEALTH LITERACY: ICD-10-CM

## 2022-09-06 DIAGNOSIS — H54.7 VISUAL IMPAIRMENT: ICD-10-CM

## 2022-09-06 DIAGNOSIS — R26.2 AMBULATORY DYSFUNCTION: Chronic | ICD-10-CM

## 2022-09-06 DIAGNOSIS — M54.50 CHRONIC MIDLINE LOW BACK PAIN: ICD-10-CM

## 2022-09-06 DIAGNOSIS — Z59.9 FINANCIAL DIFFICULTIES: Primary | ICD-10-CM

## 2022-09-06 DIAGNOSIS — Z51.5 PALLIATIVE CARE PATIENT: ICD-10-CM

## 2022-09-06 PROCEDURE — 99214 OFFICE O/P EST MOD 30 MIN: CPT | Performed by: FAMILY MEDICINE

## 2022-09-06 PROCEDURE — 1160F RVW MEDS BY RX/DR IN RCRD: CPT | Performed by: FAMILY MEDICINE

## 2022-09-06 RX ORDER — METHOCARBAMOL 750 MG/1
750 TABLET, FILM COATED ORAL
Qty: 90 TABLET | Refills: 0 | Status: SHIPPED | OUTPATIENT
Start: 2022-09-06 | End: 2022-10-10

## 2022-09-06 RX ORDER — OXYCODONE HYDROCHLORIDE 10 MG/1
10 TABLET ORAL 3 TIMES DAILY PRN
Qty: 90 TABLET | Refills: 0 | Status: SHIPPED | OUTPATIENT
Start: 2022-09-06

## 2022-09-06 RX ORDER — GABAPENTIN 300 MG/1
600 CAPSULE ORAL 2 TIMES DAILY
Qty: 120 CAPSULE | Refills: 0 | Status: SHIPPED | OUTPATIENT
Start: 2022-09-06 | End: 2022-10-10

## 2022-09-06 RX ORDER — OXYCODONE HYDROCHLORIDE 10 MG/1
10 TABLET ORAL 3 TIMES DAILY PRN
Qty: 90 TABLET | Refills: 0 | Status: SHIPPED | OUTPATIENT
Start: 2022-10-03

## 2022-09-06 SDOH — ECONOMIC STABILITY - INCOME SECURITY: PROBLEM RELATED TO HOUSING AND ECONOMIC CIRCUMSTANCES, UNSPECIFIED: Z59.9

## 2022-09-06 NOTE — PROGRESS NOTES
Outpatient Follow-Up - Palliative and Supportive Care   Jaylyn Erich Rene 68 y o  male [id number]    Assessment & Plan  Problem List Items Addressed This Visit     Ambulatory dysfunction (Chronic)    Relevant Medications    methocarbamol (ROBAXIN) 750 mg tablet    Other Relevant Orders    Ambulatory Referral to Oncology Social Worker    Palliative care patient (Chronic)    Relevant Medications    oxyCODONE (ROXICODONE) 10 MG TABS    oxyCODONE (ROXICODONE) 10 MG TABS (Start on 10/3/2022)    Chronic midline low back pain    Relevant Medications    methocarbamol (ROBAXIN) 750 mg tablet    PAD (peripheral artery disease) (HCC)    Relevant Medications    gabapentin (NEURONTIN) 300 mg capsule    Prostate cancer (HCC)    Relevant Medications    oxyCODONE (ROXICODONE) 10 MG TABS    oxyCODONE (ROXICODONE) 10 MG TABS (Start on 10/3/2022)    Other Relevant Orders    Ambulatory Referral to Oncology Social Worker      Other Visit Diagnoses     Financial difficulties    -  Primary    Relevant Orders    Ambulatory Referral to Oncology Social Worker    Cancer related pain        Relevant Medications    oxyCODONE (ROXICODONE) 10 MG TABS    oxyCODONE (ROXICODONE) 10 MG TABS (Start on 10/3/2022)    Difficulty demonstrating health literacy        Relevant Orders    Ambulatory Referral to Oncology Social Worker    Visual impairment        Relevant Orders    Ambulatory Referral to Oncology Social Worker          Medications adjusted this encounter:  Requested Prescriptions     Signed Prescriptions Disp Refills    oxyCODONE (ROXICODONE) 10 MG TABS 90 tablet 0     Sig: Take 1 tablet (10 mg total) by mouth 3 (three) times a day as needed (cancer pain) Max Daily Amount: 30 mg    oxyCODONE (ROXICODONE) 10 MG TABS 90 tablet 0     Sig: Take 1 tablet (10 mg total) by mouth 3 (three) times a day as needed (cancer pain) Max Daily Amount: 30 mg    methocarbamol (ROBAXIN) 750 mg tablet 90 tablet 0     Sig: Take 1 tablet (750 mg total) by mouth 3 (three) times a day with meals To reduce back pain and muscle pain    gabapentin (NEURONTIN) 300 mg capsule 120 capsule 0     Sig: Take 2 capsules (600 mg total) by mouth 2 (two) times a day To reduce nerve pain in feet  Orders Placed This Encounter   Procedures    [facility] Ambulatory Referral to Oncology Social Worker     Medications Discontinued During This Encounter   Medication Reason    oxyCODONE (ROXICODONE) 10 MG TABS Reorder    gabapentin (NEURONTIN) 300 mg capsule Reorder    methocarbamol (ROBAXIN) 750 mg tablet Reorder      - Ongoing close f/up in clinic   - Pt continues to be an ongoing risk for medication misuse:   = blind in one eye   = Limited medical savvy   = Fairly hard of hearing   = Little oversight or support from his family in medication checks  - no changes to meds today  Will send 2mos of Rx  - Referral to Oncology SW for:   = Financial difficulties - pt using dead wife's broken cane as ambulatory aid   = Transport - pt has visual challenges and relies very much on son for transport  Son also needs to work  = limited health literacy - does not know names of meds; has trouble reading bottles d/t visual impairment    = Hard of hearing - would benefit from hearing aid      Darren Montejo was seen today for symptoms and planning cares related to above illnesses  I have reviewed the patient's controlled substance dispensing history in the Prescription Drug Monitoring Program in compliance with the [facility] regulations before prescribing any controlled substances  They are invited to continue to follow with us  If there are questions or concerns, please contact us through our clinic/answering service 24 hours a day, seven days a week      Logan Palacios MD  [facility] Palliative and Supportive Care        Visit Information    Accompanied By: Family member    Source of History: Self, Family member    History Limitations: limited health literacy    Contacts: son Sidney Megan -                Daughter Keila Anderson [phone]    History of Present Illness      Bárbara Rene is a 68 y o  male who presents in follow up of symptoms related to Stage IV prostate cancer, hormone treatment resistant  He follow with Dr Mitali Bryant and Ms Dunia Reynolds for this  There is also a significant cardiovascular history, with heart failure management by Dr Einar Prader, and PAD with distal pain at rest in both feet  Pertinent issues include: symptom management, disease process education and discussion of prognosis      Since last visit, he continues to struggle with his diarrhea  He feels that he can control this symptom with Lomotil, as this medicatoin is effective, and the symptom only occurs on weeks that he has chemo  He states his pain is controlled with 'that medication that starts with an O', though he cannot recall the name of this med specifically  That all being said, he is consistent about pill usage, and has not run out early  He is able to read the instructions on bottles  Per Dr Mitali Bryant, the pt is not a candidate for traditional cytotoxic therapy, but he may continue on supportive therapies and hormone blockade  That being said, Dr Lotus Najjar last sentence in his plan from 8/17: "He is not a candidate for chemotherapy at this point as I do not think he will tolerate this well  If needed we can readdress this if his PSA continues to go up "      Parishijacob again did not accompany him to visit today, so I did not have any additional history to review  Referred to Onc SW to consider additional supports; no SW is available to me in my office at this time  Son getting a job that may not accommodate his father's medical needs  Family interested in  [employer] services for son to be carer  Getting Meals on Wheels, but having trouble both paying for additional meals, and standing long enough to do dishes and prep food    He inquires today about food stamps, as she has spent down over half his life savings in order to pay for cares and living expenses  As per our previous experience, he continues to both endorse very limited understanding of his meds, and no willingness to use help from family on this matter  He has not seen PCP in quite some time, and has no desire to return unless very strongly indicated  From our first visit on 3/1:    "Pt has demonstrated -- over a year ago now -- progression of his illness (rising PSA and decreasing functional status) despite aggressive therapies and cytotoxic treatments  His side effects vs decompensation in his overall illness was so severe he was admitted to [address] [place] [address]  in march 2021  Since that time, he has recovered some function with careful management of his cardiac disease, and less aggressive, less toxic cancer cares  At this time, his treatment plan involves Xofigo for bone-avid disease, +/- Lupron  We note that the patient has demonstrated some disease progression on other hormonal blockers, but that this plan of care is being offered, if not rec'd, by Dr Chuck Pallas  Pt presents to our office with suboptimally controlled pain, ongoing wt loss, poor appetite, and general malaise  His concerns are more related to his back and R foot pain; a Podiatrist today advised him that his unilateral neuropathic symptoms 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        Today, he can't quite recall his medications, he just insists that he needs 'something stronger than tynlenol'  When we pointed out that he has used tramadol and oxyIR recently, he also stated that these medications were not helpful  Pt endorses support from his son, who will drive pt to appts and on other errands  Son does not help with meds  Pt can't name his meds, but has some vague understanding of the indications for his meds "    Past medical, surgical, social, and family histories are reviewed and pertinent updates are made      Review of Systems   Constitutional: Negative for decreased appetite, weight gain and weight loss  HENT: Negative for hoarse voice and nosebleeds  Eyes: Negative for vision loss in left eye and vision loss in right eye  Cardiovascular: Negative for chest pain and dyspnea on exertion  Respiratory: Negative for cough and shortness of breath  Endocrine: Negative for polydipsia, polyphagia and polyuria  Skin: Negative for flushing and itching  Musculoskeletal: Positive for arthritis, back pain and neck pain  Negative for falls  Gastrointestinal: Positive for diarrhea  Negative for anorexia, jaundice, nausea and vomiting  Genitourinary: Negative for frequency  Neurological: Negative for dizziness  Psychiatric/Behavioral: Negative for depression and memory loss  The patient is not nervous/anxious  Vital Signs    /62 (BP Location: Left arm, Patient Position: Sitting, Cuff Size: Standard)   Pulse 87   Temp (!) 97 3 °F (36 3 °C) (Temporal)   Wt 89 3 kg (196 lb 13 9 oz)   SpO2 99%   BMI 30 83 kg/m²     Physical Exam and Objective Data  Physical Exam  Constitutional:       General: He is not in acute distress  Appearance: He is ill-appearing  He is not toxic-appearing or diaphoretic  Comments: Frail   HENT:      Head: Normocephalic and atraumatic  Right Ear: External ear normal       Left Ear: External ear normal    Eyes:      General:         Right eye: No discharge  Left eye: No discharge  Conjunctiva/sclera: Conjunctivae normal       Pupils: Pupils are equal, round, and reactive to light  Neck:      Trachea: No tracheal deviation  Cardiovascular:      Rate and Rhythm: Normal rate and regular rhythm  Pulmonary:      Effort: Pulmonary effort is normal  No respiratory distress  Breath sounds: No stridor  Abdominal:      General: There is no distension  Palpations: Abdomen is soft  Skin:     General: Skin is warm and dry  Coloration: Skin is pale        Findings: No erythema or rash  Neurological:      General: No focal deficit present  Mental Status: He is alert and oriented to person, place, and time  Mental status is at baseline  Cranial Nerves: No cranial nerve deficit  Psychiatric:      Comments: Judgment and insight limited           Radiology and Laboratory:  I personally reviewed and interpreted the following results: none new    35+ minutes was spent face to face with Royce BUFFY Anju  with greater than 50% of the time spent in counseling or coordination of care including: chart review; symptom pursuit; supportive listening; anticipatory guidance  All of the patient's or agent's questions were answered during this discussion

## 2022-09-08 ENCOUNTER — TELEPHONE (OUTPATIENT)
Dept: INTERNAL MEDICINE CLINIC | Facility: CLINIC | Age: 77
End: 2022-09-08

## 2022-09-08 DIAGNOSIS — G89.3 CANCER-RELATED PAIN: ICD-10-CM

## 2022-09-08 DIAGNOSIS — C61 PROSTATE CANCER (HCC): Primary | ICD-10-CM

## 2022-09-08 DIAGNOSIS — Z51.5 PALLIATIVE CARE PATIENT: ICD-10-CM

## 2022-09-08 NOTE — TELEPHONE ENCOUNTER
pallative care called me this morning and they need a [place] [facility]  I called to get the auth and the [place] is saying we need to fax a referral for pain mangement , I need to fax over asap  Can you place a referral for that for dr Ayad Haynes

## 2022-09-08 NOTE — TELEPHONE ENCOUNTER
Phone call to PCP office spoke to [facility]  to check on status of [place] referral  She states the office is currently working on it

## 2022-09-09 NOTE — TELEPHONE ENCOUNTER
I sent in paper work to the [facility] to get auth for this, I am waiting for response from them, I sent it 9-8-2022, sent it attn Foo[facility], [facility]  - [facility]

## 2022-09-21 ENCOUNTER — REMOTE DEVICE CLINIC VISIT (OUTPATIENT)
Dept: CARDIOLOGY CLINIC | Facility: CLINIC | Age: 77
End: 2022-09-21

## 2022-09-21 DIAGNOSIS — Z95.810 PRESENCE OF IMPLANTABLE CARDIOVERTER-DEFIBRILLATOR (ICD): Primary | ICD-10-CM

## 2022-09-21 PROCEDURE — RECHECK: Performed by: INTERNAL MEDICINE

## 2022-09-21 NOTE — PROGRESS NOTES
MARCY BI-V ICD -ACTIVE SYSTEM IS MRI CONDITIONAL   NB MERLIN TRANSMISSION:  VT EPISODE CHECK PER    SN:  BATTERY VOLTAGE ADEQUATE (5 9 YR )   AP 63% BP 98%   ALL LEAD PARAMETERS WITHIN NORMAL LIMITS   NO SIGNIFICANT HIGH RATE EPISODES   CORVUE IMPEDANCE THRESHOLD CROSSED LAST 12 DAYS   TASK TO HF MALINI Johnson DEVICE FUNCTION   RG

## 2022-09-26 DIAGNOSIS — I50.22 CHRONIC HFREF (HEART FAILURE WITH REDUCED EJECTION FRACTION) (HCC): ICD-10-CM

## 2022-09-26 RX ORDER — LOSARTAN POTASSIUM 25 MG/1
TABLET ORAL
Qty: 45 TABLET | Refills: 1 | Status: SHIPPED | OUTPATIENT
Start: 2022-09-26

## 2022-09-27 ENCOUNTER — TELEPHONE (OUTPATIENT)
Dept: INTERNAL MEDICINE CLINIC | Facility: CLINIC | Age: 77
End: 2022-09-27

## 2022-09-27 ENCOUNTER — PATIENT OUTREACH (OUTPATIENT)
Dept: CASE MANAGEMENT | Facility: HOSPITAL | Age: 77
End: 2022-09-27

## 2022-09-27 ENCOUNTER — HOSPITAL ENCOUNTER (OUTPATIENT)
Dept: INFUSION CENTER | Facility: CLINIC | Age: 77
Discharge: HOME | End: 2022-09-27
Payer: COMMERCIAL

## 2022-09-27 DIAGNOSIS — Z95.828 PORT-A-CATH IN PLACE: Primary | ICD-10-CM

## 2022-09-27 PROCEDURE — 96523 IRRIG DRUG DELIVERY DEVICE: CPT

## 2022-09-27 NOTE — PROGRESS NOTES
Patient presents for port flush  Port accessed without difficulty, flushes and aspirates freely  Port saline locked and de-accessed  He will need labs in 6 more weeks so this was scheduled for him  He is also returning for Lupron on October 10th  Patient aware of next appointment  AVS declined

## 2022-09-27 NOTE — TELEPHONE ENCOUNTER
[facility]   Can that be sent to [facility]

## 2022-09-27 NOTE — PROGRESS NOTES
MSW phoned and spoke with patient  Patient states he is doing ok  Patient states he had applied for food stamps in the past and was denied because his income was $100 over  Patient states he would like his son to be paid to care for him  Patient states his eyesight is worsening and his hearing is not too good  MSW referred patient to therese to assist with applying for waiver services  MSW phoned patient's PCP's office [phone] Dr Jesus Cabezas MD and spoke with [facility] who states she requested doctor's orders for VNA services for patient  Patient is agreeable to VNA services  Patient states he is having difficulty with organizing his medications  Patient states he is running out of money and it will be helpful if his son is paid to care for him at home to bring income into the home  Patient states he has enough money in the bank for one year  MSW provided emotional support services  MSW will continue to follow up

## 2022-09-27 NOTE — PROGRESS NOTES
MSW received referral and chart reviewed  MSW have completed initial assessment and DT screening    MSW will follow up on new referral request

## 2022-09-30 ENCOUNTER — PATIENT OUTREACH (OUTPATIENT)
Dept: CASE MANAGEMENT | Facility: HOSPITAL | Age: 77
End: 2022-09-30

## 2022-09-30 DIAGNOSIS — Z51.5 PALLIATIVE CARE PATIENT: Primary | ICD-10-CM

## 2022-09-30 DIAGNOSIS — R26.2 AMBULATORY DYSFUNCTION: ICD-10-CM

## 2022-09-30 NOTE — PROGRESS NOTES
MSW received confirmation e-mail from [facility] they will reach out to patient and son-Rodrigue  YUNIOR will continue to follow

## 2022-10-05 DIAGNOSIS — C79.51 MALIGNANT NEOPLASM METASTATIC TO BONE (HCC): Primary | ICD-10-CM

## 2022-10-06 NOTE — TELEPHONE ENCOUNTER
Spoke to [employer] from [place] Hematology/oncology clinic  She stated she would work on referral for Mario Howell but said it could take awhile      Bubba Neville   [phone]

## 2022-10-08 DIAGNOSIS — R26.2 AMBULATORY DYSFUNCTION: Chronic | ICD-10-CM

## 2022-10-08 DIAGNOSIS — I73.9 PAD (PERIPHERAL ARTERY DISEASE) (HCC): ICD-10-CM

## 2022-10-08 DIAGNOSIS — M54.50 CHRONIC MIDLINE LOW BACK PAIN: ICD-10-CM

## 2022-10-08 DIAGNOSIS — G89.29 CHRONIC MIDLINE LOW BACK PAIN: ICD-10-CM

## 2022-10-10 ENCOUNTER — HOSPITAL ENCOUNTER (OUTPATIENT)
Dept: INFUSION CENTER | Facility: CLINIC | Age: 77
Discharge: HOME | End: 2022-10-10

## 2022-10-10 ENCOUNTER — APPOINTMENT (OUTPATIENT)
Dept: LAB | Facility: CLINIC | Age: 77
End: 2022-10-10
Payer: COMMERCIAL

## 2022-10-10 DIAGNOSIS — C61 PROSTATE CANCER (HCC): ICD-10-CM

## 2022-10-10 DIAGNOSIS — C79.51 MALIGNANT NEOPLASM METASTATIC TO BONE (HCC): ICD-10-CM

## 2022-10-10 LAB
ALBUMIN SERPL BCP-MCNC: 3.9 G/DL (ref 3.5–5)
ALP SERPL-CCNC: 108 U/L (ref 34–104)
ALT SERPL W P-5'-P-CCNC: 10 U/L (ref 7–52)
ANION GAP SERPL CALCULATED.3IONS-SCNC: 5 MMOL/L (ref 4–13)
AST SERPL W P-5'-P-CCNC: 12 U/L (ref 13–39)
BILIRUB SERPL-MCNC: 0.55 MG/DL (ref 0.2–1)
BUN SERPL-MCNC: 14 MG/DL (ref 5–25)
CALCIUM SERPL-MCNC: 9 MG/DL (ref 8.4–10.2)
CHLORIDE SERPL-SCNC: 108 MMOL/L (ref 96–108)
CO2 SERPL-SCNC: 28 MMOL/L (ref 21–32)
CREAT SERPL-MCNC: 0.8 MG/DL (ref 0.6–1.3)
GFR SERPL CREATININE-BSD FRML MDRD: 86 ML/MIN/1.73SQ M
GLUCOSE P FAST SERPL-MCNC: 84 MG/DL (ref 65–99)
POTASSIUM SERPL-SCNC: 4.4 MMOL/L (ref 3.5–5.3)
PROT SERPL-MCNC: 6.4 G/DL (ref 6.4–8.4)
SODIUM SERPL-SCNC: 141 MMOL/L (ref 135–147)

## 2022-10-10 PROCEDURE — 80053 COMPREHEN METABOLIC PANEL: CPT

## 2022-10-10 PROCEDURE — 36415 COLL VENOUS BLD VENIPUNCTURE: CPT

## 2022-10-10 RX ORDER — GABAPENTIN 300 MG/1
600 CAPSULE ORAL 2 TIMES DAILY
Qty: 120 CAPSULE | Refills: 0 | Status: SHIPPED | OUTPATIENT
Start: 2022-10-10 | End: 2022-10-26

## 2022-10-10 RX ORDER — METHOCARBAMOL 750 MG/1
750 TABLET, FILM COATED ORAL
Qty: 90 TABLET | Refills: 0 | Status: SHIPPED | OUTPATIENT
Start: 2022-10-10

## 2022-10-10 NOTE — PROGRESS NOTES
Patient to [facility] for Viviana / Lucilla Fleischer: Offers no complaints at present time: Lab work ( 08/15/22 ) reviewed: Calcium - 8 7: No new lab work provided: Dr Anni Flynn made aware: Patient to get labs before treatment can be initiated: Patient made aware of situation: Will visit outpatient lab and return to Infusion in 1 hour

## 2022-10-11 DIAGNOSIS — C61 PROSTATE CANCER (HCC): Primary | ICD-10-CM

## 2022-10-11 DIAGNOSIS — C79.51 MALIGNANT NEOPLASM METASTATIC TO BONE (HCC): ICD-10-CM

## 2022-10-11 NOTE — TELEPHONE ENCOUNTER
Received call from Essence Quiroga at [facility]  She stated they are unable to process referral for Palliative care through [facility]  Adriana stated [facility] does not [facility] patient's Palliative Care visits  Phone call to Patient to inform him his VA will not cover Palliative Care appointments and to verify it is ok to continue to bill his [place] [employer] for appointments  Patient stated to call back he is watching the ball game

## 2022-10-11 NOTE — TELEPHONE ENCOUNTER
Phone call to [facility] spoke to Jes Sotelo from Hem/Onc clinic  She stated she will talk to [facility] about putting a palliative care referral in through community care  She will return call to Palliative care to let us know the status of how long referral will take  Informed Adriana patient has an appointment coming up in November

## 2022-10-12 ENCOUNTER — HOSPITAL ENCOUNTER (OUTPATIENT)
Dept: INFUSION CENTER | Facility: CLINIC | Age: 77
Discharge: HOME | End: 2022-10-12
Payer: COMMERCIAL

## 2022-10-12 ENCOUNTER — REMOTE DEVICE CLINIC VISIT (OUTPATIENT)
Dept: CARDIOLOGY CLINIC | Facility: CLINIC | Age: 77
End: 2022-10-12
Payer: COMMERCIAL

## 2022-10-12 VITALS — WEIGHT: 203 LBS | BODY MASS INDEX: 31.79 KG/M2

## 2022-10-12 DIAGNOSIS — C61 PROSTATE CANCER (HCC): Primary | ICD-10-CM

## 2022-10-12 DIAGNOSIS — C79.51 MALIGNANT NEOPLASM METASTATIC TO BONE (HCC): ICD-10-CM

## 2022-10-12 DIAGNOSIS — Z95.810 AICD (AUTOMATIC CARDIOVERTER/DEFIBRILLATOR) PRESENT: Primary | ICD-10-CM

## 2022-10-12 PROBLEM — R50.9 FEVER: Status: RESOLVED | Noted: 2021-07-10 | Resolved: 2022-10-12

## 2022-10-12 PROCEDURE — 96402 CHEMO HORMON ANTINEOPL SQ/IM: CPT

## 2022-10-12 PROCEDURE — 96372 THER/PROPH/DIAG INJ SC/IM: CPT

## 2022-10-12 PROCEDURE — 93295 DEV INTERROG REMOTE 1/2/MLT: CPT | Performed by: INTERNAL MEDICINE

## 2022-10-12 PROCEDURE — 93296 REM INTERROG EVL PM/IDS: CPT | Performed by: INTERNAL MEDICINE

## 2022-10-12 RX ADMIN — DENOSUMAB 120 MG: 120 INJECTION SUBCUTANEOUS at 09:30

## 2022-10-12 RX ADMIN — LEUPROLIDE ACETATE 22.5 MG: KIT at 09:36

## 2022-10-12 NOTE — PROGRESS NOTES
Pt to clinic for xgeva and lupron injections, labs from 10/10/22 checked and within parameters for treatment, pt tolerated xgeva in left arm and lupron in left buttocks, walked pt to scheduling to make next appointments

## 2022-10-12 NOTE — PROGRESS NOTES
Results for orders placed or performed in visit on 10/12/22   Cardiac EP device report    Narrative    SJM BI-V ICD -ACTIVE SYSTEM IS MRI CONDITIONAL  MERLIN TRANSMISSION: BATTERY STATUS "6 YRS " AP 63% BVP 98%  ALL AVAILABLE LEAD PARAMETERS WITHIN NORMAL LIMITS  NO SIGNIFICANT HIGH RATE EPISODES  CORVUE IMPEDANCE MONITORING WITHIN NORMAL LIMITS  NORMAL DEVICE FUNCTION   NC

## 2022-10-26 DIAGNOSIS — I73.9 PAD (PERIPHERAL ARTERY DISEASE) (HCC): ICD-10-CM

## 2022-10-26 RX ORDER — GABAPENTIN 300 MG/1
600 CAPSULE ORAL 2 TIMES DAILY
Qty: 120 CAPSULE | Refills: 0 | Status: SHIPPED | OUTPATIENT
Start: 2022-10-26

## 2022-11-04 ENCOUNTER — TELEPHONE (OUTPATIENT)
Dept: SURGICAL ONCOLOGY | Facility: CLINIC | Age: 77
End: 2022-11-04

## 2022-11-04 NOTE — TELEPHONE ENCOUNTER
[address] [place] [address] at [phone] regarding obtaining a referral for patient for his appt  W/ Dr Yoon Felipe on 11/17/22  I was transferred to the  who transferred me to the Hematology Oncology Clinical, [facility]  The patient's provider is [facility]  I was transferred to Dr Luis Alonso's office  I left a detailed message about the referral and asked to have it faxed over to [phone]

## 2022-11-08 ENCOUNTER — HOSPITAL ENCOUNTER (OUTPATIENT)
Dept: INFUSION CENTER | Facility: CLINIC | Age: 77
Discharge: HOME | End: 2022-11-08

## 2022-11-08 ENCOUNTER — OFFICE VISIT (OUTPATIENT)
Dept: PALLIATIVE MEDICINE | Facility: CLINIC | Age: 77
End: 2022-11-08

## 2022-11-08 VITALS
WEIGHT: 93.7 LBS | BODY MASS INDEX: 14.71 KG/M2 | HEART RATE: 77 BPM | HEIGHT: 67 IN | DIASTOLIC BLOOD PRESSURE: 62 MMHG | SYSTOLIC BLOOD PRESSURE: 100 MMHG | OXYGEN SATURATION: 98 % | TEMPERATURE: 96.4 F

## 2022-11-08 DIAGNOSIS — G89.3 CANCER RELATED PAIN: ICD-10-CM

## 2022-11-08 DIAGNOSIS — R26.2 AMBULATORY DYSFUNCTION: Chronic | ICD-10-CM

## 2022-11-08 DIAGNOSIS — C61 PROSTATE CANCER (HCC): ICD-10-CM

## 2022-11-08 DIAGNOSIS — M54.50 CHRONIC MIDLINE LOW BACK PAIN: ICD-10-CM

## 2022-11-08 DIAGNOSIS — G89.29 CHRONIC MIDLINE LOW BACK PAIN: ICD-10-CM

## 2022-11-08 DIAGNOSIS — Z51.5 PALLIATIVE CARE PATIENT: ICD-10-CM

## 2022-11-08 DIAGNOSIS — C79.51 MALIGNANT NEOPLASM METASTATIC TO BONE (HCC): ICD-10-CM

## 2022-11-08 DIAGNOSIS — Z95.828 PORT-A-CATH IN PLACE: Primary | ICD-10-CM

## 2022-11-08 DIAGNOSIS — I73.9 PAD (PERIPHERAL ARTERY DISEASE) (HCC): ICD-10-CM

## 2022-11-08 LAB
ALBUMIN SERPL BCP-MCNC: 3.9 G/DL (ref 3.5–5)
ALP SERPL-CCNC: 106 U/L (ref 34–104)
ALT SERPL W P-5'-P-CCNC: 9 U/L (ref 7–52)
ANION GAP SERPL CALCULATED.3IONS-SCNC: 5 MMOL/L (ref 4–13)
AST SERPL W P-5'-P-CCNC: 11 U/L (ref 13–39)
BASOPHILS # BLD AUTO: 0.03 THOUSANDS/ÂΜL (ref 0–0.1)
BASOPHILS NFR BLD AUTO: 1 % (ref 0–1)
BILIRUB SERPL-MCNC: 0.61 MG/DL (ref 0.2–1)
BUN SERPL-MCNC: 14 MG/DL (ref 5–25)
CALCIUM SERPL-MCNC: 7.9 MG/DL (ref 8.4–10.2)
CHLORIDE SERPL-SCNC: 109 MMOL/L (ref 96–108)
CO2 SERPL-SCNC: 24 MMOL/L (ref 21–32)
CREAT SERPL-MCNC: 0.67 MG/DL (ref 0.6–1.3)
EOSINOPHIL # BLD AUTO: 0.19 THOUSAND/ÂΜL (ref 0–0.61)
EOSINOPHIL NFR BLD AUTO: 3 % (ref 0–6)
ERYTHROCYTE [DISTWIDTH] IN BLOOD BY AUTOMATED COUNT: 13.8 % (ref 11.6–15.1)
GFR SERPL CREATININE-BSD FRML MDRD: 93 ML/MIN/1.73SQ M
GLUCOSE SERPL-MCNC: 86 MG/DL (ref 65–140)
HCT VFR BLD AUTO: 32.8 % (ref 36.5–49.3)
HGB BLD-MCNC: 10.8 G/DL (ref 12–17)
IMM GRANULOCYTES # BLD AUTO: 0.04 THOUSAND/UL (ref 0–0.2)
IMM GRANULOCYTES NFR BLD AUTO: 1 % (ref 0–2)
LYMPHOCYTES # BLD AUTO: 1.22 THOUSANDS/ÂΜL (ref 0.6–4.47)
LYMPHOCYTES NFR BLD AUTO: 21 % (ref 14–44)
MCH RBC QN AUTO: 30.4 PG (ref 26.8–34.3)
MCHC RBC AUTO-ENTMCNC: 32.9 G/DL (ref 31.4–37.4)
MCV RBC AUTO: 92 FL (ref 82–98)
MONOCYTES # BLD AUTO: 0.59 THOUSAND/ÂΜL (ref 0.17–1.22)
MONOCYTES NFR BLD AUTO: 10 % (ref 4–12)
NEUTROPHILS # BLD AUTO: 3.69 THOUSANDS/ÂΜL (ref 1.85–7.62)
NEUTS SEG NFR BLD AUTO: 64 % (ref 43–75)
NRBC BLD AUTO-RTO: 0 /100 WBCS
PLATELET # BLD AUTO: 219 THOUSANDS/UL (ref 149–390)
PMV BLD AUTO: 9.1 FL (ref 8.9–12.7)
POTASSIUM SERPL-SCNC: 4.1 MMOL/L (ref 3.5–5.3)
PROT SERPL-MCNC: 6.3 G/DL (ref 6.4–8.4)
PSA SERPL-MCNC: 76.4 NG/ML (ref 0–4)
RBC # BLD AUTO: 3.55 MILLION/UL (ref 3.88–5.62)
SODIUM SERPL-SCNC: 138 MMOL/L (ref 135–147)
WBC # BLD AUTO: 5.76 THOUSAND/UL (ref 4.31–10.16)

## 2022-11-08 RX ORDER — GABAPENTIN 300 MG/1
600 CAPSULE ORAL 2 TIMES DAILY
Qty: 120 CAPSULE | Refills: 0 | Status: SHIPPED | OUTPATIENT
Start: 2022-11-08

## 2022-11-08 RX ORDER — OXYCODONE HYDROCHLORIDE 10 MG/1
10 TABLET ORAL 3 TIMES DAILY PRN
Qty: 90 TABLET | Refills: 0 | Status: SHIPPED | OUTPATIENT
Start: 2022-12-05

## 2022-11-08 RX ORDER — OXYCODONE HYDROCHLORIDE 10 MG/1
10 TABLET ORAL 3 TIMES DAILY PRN
Qty: 90 TABLET | Refills: 0 | Status: SHIPPED | OUTPATIENT
Start: 2022-11-08

## 2022-11-08 RX ORDER — METHOCARBAMOL 750 MG/1
750 TABLET, FILM COATED ORAL
Qty: 90 TABLET | Refills: 0 | Status: SHIPPED | OUTPATIENT
Start: 2022-11-08

## 2022-11-08 NOTE — Clinical Note
Pt notes chest pain, not ongoing, but recent and sharp  Unclear if this is cardiac  Would you be able to reach out to pt's son Dominga Smith to schedule f/up and possible device interrogation? Pt himself has very limited insight

## 2022-11-08 NOTE — PLAN OF CARE
Problem: Potential for Falls  Goal: Patient will remain free of falls  Description: INTERVENTIONS:  - Educate patient/family on patient safety including physical limitations  - Instruct patient to call for assistance with activity   - Consult OT/PT to assist with strengthening/mobility   - Keep Call bell within reach  - Keep bed low and locked with side rails adjusted as appropriate  - Keep care items and personal belongings within reach  - Initiate and maintain comfort rounds  - Make Fall Risk Sign visible to staff  -  - Apply yellow socks and bracelet for high fall risk patients  - Consider moving patient to room near nurses station  Outcome: Progressing     Problem: Knowledge Deficit  Goal: Patient/family/caregiver demonstrates understanding of disease process, treatment plan, medications, and discharge instructions  Description: Complete learning assessment and assess knowledge base    Interventions:  - Provide teaching at level of understanding  - Provide teaching via preferred learning methods  Outcome: Progressing

## 2022-11-08 NOTE — Clinical Note
Pt was previously seen by Ms Isidra Villagomez of med/onc SW  Appears there is no f/up since 9/30, despite multiple needs  Will refer again to Med/Onc SW for f/up of previously identifed challenges  I have nursing-home eligibility forms that will likely improve waiver service application, and will fill these out today

## 2022-11-08 NOTE — PROGRESS NOTES
Outpatient Follow-Up - Palliative and Supportive Care   Sergio Rene 68 y o  male [id number]    Assessment & Plan  Problem List Items Addressed This Visit    None         Medications adjusted this encounter:  Requested Prescriptions      No prescriptions requested or ordered in this encounter     No orders of the defined types were placed in this encounter  There are no discontinued medications  - Ongoing close f/up in Palliative clinic  Next visit 6 wks, just after visit with Ms Rojas Ramirez  - Pt continues to be an ongoing risk for medication misuse:   = blind in one eye   = Limited medical savvy   = Fairly hard of hearing   = Little oversight or support from his family in medication checks  - No changes to meds today  Will send 2mos of Rx  - Re-referral to Oncology SW; I see no documented f/up from Ms Smallwood in about 6 wks for the following:   = Financial difficulties - pt STILL using dead wife's broken cane as ambulatory aid   = Transport - pt has visual challenges and relies very much on son for transport  Son also needs to work  = Limited health literacy - does not know names of meds; has trouble reading bottles d/t visual impairment    = Hard of hearing - would benefit from hearing aid      Jerepaulette Ames was seen today for symptoms and planning cares related to above illnesses  I have reviewed the patient's controlled substance dispensing history in the Prescription Drug Monitoring Program in compliance with the [facility] regulations before prescribing any controlled substances  They are invited to continue to follow with us  If there are questions or concerns, please contact us through our clinic/answering service 24 hours a day, seven days a week      Wendy Alvarado  [facility] Palliative and Supportive Care        Visit Information    Accompanied By: Family member    Source of History: Self, Family member    History Limitations: limited health literacy    Contacts: son Balbir Baca -                Daughter Ramos Castro - [phone]    History of Present Illness      Carrie Rene is a 68 y o  male who presents in follow up of symptoms related to Stage IV prostate cancer, hormone treatment resistant  He follow with Dr Majorie Goltz and Ms Sunshine Jacobo for this  There is also a significant cardiovascular history, with heart failure management by Dr Trav Kimball, and PAD with distal pain at rest in both feet  Pertinent issues include: symptom management, resource management  Since last visit, pt's son Dee Dee Snow is still living in home, but cannot provide enough cares to pt  Dee Dee Snow is curerntly working full time to support the household  Family would like to obtain waiver services to denise income to son for support he is already providing  Pt notes memory challenges complicating his medication usage, and with ADLs  He has trouble standing to dress, shower, shave, brush teeth  Chores about the house are nearly impossible because he cannot bend over, and he can't stand for more than about 3min at a time  This complicates his ability to cook for self as well  He does not wish to leave home, but would appreciate aids in the home to help clean  Has a Roomba to get some of the surfaces / floors in home  Maixmiliano Mac has a fixed income, and has little disposible income after living expenses  We have received a Nursing Home Eligibility form, which I will fill out today, having now better understood what the pt's current needs and situation are  Re: cancer cares, his PSA conitnues to hover in high 20s low 30s  He is due to f/up with Dr Majorie Goltz in December now; schedule for Dr Majorie Goltz was adjusted for pt's previously scheduled visit in Nov       Today in office, the pt cannot say where his medicines come from, nor exactly how they arrive at his home  He knows that his son sometimes picks up certain meds direct from pharmacy    Pt states he has an infusion appointment today, but he doesn't know why, and hypothesizes this is in response to an episode of chest pain he had two or three days ago  In a separate phone call to pt's son, we confirm that:   - Pt is not leaving home, and pt has significant ambulatory challenges about the home    - Pt's meds are set up by the son, and picked up in person when he has the chance  - Some meds might be delivered by [facility], but son has not coordinated this  - Son is not aware of any chest pain reported by the patient; he remembers that the pt's device was interrogated some months ago, in the Spring of 2022, and not since that time    - Son is driving pt to all appts, and still seeking waiver services  [facility] has some rates at $19+/hr, which would be preferable income over Network quoted rates of $12/hr  Diarreha controlled with Lomotil, only occurs on weeks that he has chemo  He states his pain is controlled with 'that medication that starts with an O', though he cannot recall the name of this med specifically  That all being said, he is consistent about pill usage, and has not run out early  He is able to read the instructions on bottles  Per Dr Jasbir Brooks, the pt is not a candidate for traditional cytotoxic therapy, but he may continue on supportive therapies and hormone blockade  That being said, Dr Fraire Parents last sentence in his plan from 8/17: "He is not a candidate for chemotherapy at this point as I do not think he will tolerate this well  If needed we can readdress this if his PSA continues to go up "    Pt has not seen PCP in quite some time, and has no desire to return unless very strongly indicated  Getting Meals on Wheels, but having trouble both paying for additional meals, and standing long enough to do dishes and prep food  He inquires today about food stamps, as she has spent down over half his life savings in order to pay for cares and living expenses        From our first visit on 3/1:    "Pt has demonstrated -- over a year ago now -- progression of his illness (rising PSA and decreasing functional status) despite aggressive therapies and cytotoxic treatments  His side effects vs decompensation in his overall illness was so severe he was admitted to [facility] [address] in march 2021  Since that time, he has recovered some function with careful management of his cardiac disease, and less aggressive, less toxic cancer cares  At this time, his treatment plan involves Xofigo for bone-avid disease, +/- Lupron  We note that the patient has demonstrated some disease progression on other hormonal blockers, but that this plan of care is being offered, if not rec'd, by Dr Jasbir Brooks  Pt presents to our office with suboptimally controlled pain, ongoing wt loss, poor appetite, and general malaise  His concerns are more related to his back and R foot pain; a Podiatrist today advised him that his unilateral neuropathic symptoms 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        Today, he can't quite recall his medications, he just insists that he needs 'something stronger than tynlenol'  When we pointed out that he has used tramadol and oxyIR recently, he also stated that these medications were not helpful  Pt endorses support from his son, who will drive pt to appts and on other errands  Son does not help with meds  Pt can't name his meds, but has some vague understanding of the indications for his meds "    Past medical, surgical, social, and family histories are reviewed and pertinent updates are made  Review of Systems   Unable to perform ROS: mental status change (confused today)         Vital Signs    /62 (BP Location: Right arm, Patient Position: Sitting, Cuff Size: Standard)   Pulse 77   Temp (!) 96 4 °F (35 8 °C) (Temporal)   Ht 5' 7" (1 702 m)   Wt 42 5 kg (93 lb 11 2 oz)   SpO2 98%   BMI 14 68 kg/m²     Physical Exam and Objective Data  Physical Exam  Constitutional:       General: He is not in acute distress       Appearance: He is ill-appearing  He is not toxic-appearing or diaphoretic  Comments: Frail   HENT:      Head: Normocephalic and atraumatic  Right Ear: External ear normal       Left Ear: External ear normal    Eyes:      General:         Right eye: No discharge  Left eye: No discharge  Conjunctiva/sclera: Conjunctivae normal       Pupils: Pupils are equal, round, and reactive to light  Neck:      Trachea: No tracheal deviation  Cardiovascular:      Rate and Rhythm: Normal rate and regular rhythm  Pulmonary:      Effort: Pulmonary effort is normal  No respiratory distress  Breath sounds: No stridor  Abdominal:      Palpations: Abdomen is soft  Comments: scaphoid   Skin:     General: Skin is warm and dry  Coloration: Skin is pale  Findings: No erythema or rash  Neurological:      General: No focal deficit present  Mental Status: He is alert  He is disoriented  Cranial Nerves: No cranial nerve deficit  Psychiatric:      Comments: Pleasant, but confused       Radiology and Laboratory:  I personally reviewed and interpreted the following results: none new    35+ minutes was spent face to face with Royce BUFFY Anju  with greater than 50% of the time spent in counseling or coordination of care including: chart review; symptom pursuit; supportive listening; anticipatory guidance  review and assessment of decisional apparatus and capacity, applicable legal codes and extant documents; All of the patient's or agent's questions were answered during this discussion

## 2022-11-08 NOTE — PROGRESS NOTES
Patient is here for central labs  He offers no complaints at this time  Labs drawn per dr's orders   Next appointment confirmed and he declined avs

## 2022-11-09 ENCOUNTER — TELEPHONE (OUTPATIENT)
Dept: HEMATOLOGY ONCOLOGY | Facility: CLINIC | Age: 77
End: 2022-11-09

## 2022-11-09 NOTE — TELEPHONE ENCOUNTER
11/09/22    Lab Results   Component Value Date    PSA 76 4 (H) 11/08/2022    PSA 30 2 (H) 08/15/2022    PSA 22 2 (H) 07/18/2022     PSA level reviewed by Dr Elana Brown and Leonela Moreno- advising to move up his appt to discuss NM referral     Spoke with pt and r/s him to see us tomorrow  Pt verbalized great understanding

## 2022-11-09 NOTE — PROGRESS NOTES
Hematology/Oncology Outpatient Follow- up Note  Leroy Glass, 1945, [id number]  11/10/2022        Chief Complaint   Patient presents with   • Follow-up       HPI:  Calin Gutierrez a 68year old male with multiple medical conditions including a significant cardiovascular history, heart failure, PAD  He has been following with [address] [place] [address] Oncology since 10/2021 for stage IV prostate cancer  He was previously treated at an outside institution  Per review of his EMR, he was initially diagnosed with a Francisco Javier 7 prostate cancer s/p treatment with external beam radiation from 10/2003 to 12/2003  He also got neoadjuvant and adjuvant LHRH for 9 months after radiation  Cecil Salter was observed until July of 2008 when he was noted to have rising PSA and imaging done at that time showed new bony lesions  He was restarted on Lupron  He had evidence of disease progression on imaging completed in June of 2017  Enzalutamide was added to his regimen  His PSA continued to rise and he had progressive bony metastases including to the sacrum for which he received a course of palliative radiation, which was completed in February of 2019  He was then restarted on enzalutamide but a scan in December of 2019 showed worsening bone metastases so he was started on docetaxel in March of 2020   He was kept on this for approximately a year with his PSA slowly rising   After March, he was admitted to a hospital for cardiac issues and then was lost to follow up with his previous oncologist     He established care with Dr Keyla Ngo and was restarted on Lupron in 10/2021  He was referred to nuclear Medicine and treated with Zaynab Sylvia from 11/4/2021 to 4/2022  He was rechallenged with Nyla Peterson in addition to Lupron in May 2022  Margorie Picking was added to his regimen in August 2022    Previous Hematologic/ Oncologic History:    As per HPI    Current Hematologic/ Oncologic Treatment:    Lupron every 3 months, last given 10/12/22  Sahara Osgood, last given 10/12/22    ECO - Symptomatic, <50% confined to bed    Interval History:   The patient presents for routine follow up  He was last seen by Dr Mendy Haas on 22  He is following closely with palliative care, last seen on 22 and it was documented that "Pt is not leaving home, and pt has significant ambulatory challenges about the home"  Many needs were identified including difficulty paying for food, basic living expenses  We have reached out to our , Sandra Blanco and she informs us she will be reaching out to patient and his son today  Most recent blood work from 22 reviewed  His PSA continues to rise, now up to 76 4 from 30 2 when last checked in August  CBC-D shows normal white count, platelet count and differential  Hgb is stable at 10 8  Creatinine 0 67, Calcium 7 9, AST 11/ALT 9, Alk Phos 106  When last seen by Dr Mendy Haas he was noted to have ECOG PS 1-2 and was not considered to be a candidate for chemotherapy  He does have PSMA avid disease  We now have Pluvicto available  Dr Mendy Haas would like him referred back to NM to be evaluated for this therapy  Patient arrived by himself today  He does endorse symptoms of back pain, neuropathy significantly in his feet, fatigue  Occasional diarrhea  He states eating high-fiber cereal has helped with his diarrheal symptoms  Denies nausea vomiting  Denies abdominal pain  Denies pelvic pain, no hematuria  He does report frequent urination secondary to water pill he takes daily  For the most part, he states he is remembering to take his medications as prescribed  He is getting meals from meals on wheels  Cancer Staging:  Cancer Staging  No matching staging information was found for the patient  Molecular Testing:             Test Results:    Imaging: Cardiac EP device report    Result Date: 10/12/2022  Narrative: SJM BI-V ICD -ACTIVE SYSTEM IS MRI CONDITIONAL MERLIN TRANSMISSION: BATTERY STATUS "6 YRS " AP 63% BVP 98%  ALL AVAILABLE LEAD PARAMETERS WITHIN NORMAL LIMITS  NO SIGNIFICANT HIGH RATE EPISODES  CORVUE IMPEDANCE MONITORING WITHIN NORMAL LIMITS  NORMAL DEVICE FUNCTION  NC       Labs:   Lab Results   Component Value Date    WBC 5 76 11/08/2022    HGB 10 8 (L) 11/08/2022    HCT 32 8 (L) 11/08/2022    MCV 92 11/08/2022     11/08/2022     Lab Results   Component Value Date    K 4 1 11/08/2022     (H) 11/08/2022    CO2 24 11/08/2022    BUN 14 11/08/2022    CREATININE 0 67 11/08/2022    GLUF 84 10/10/2022    CALCIUM 7 9 (L) 11/08/2022    CORRECTEDCA 9 2 06/23/2021    AST 11 (L) 11/08/2022    ALT 9 11/08/2022    ALKPHOS 106 (H) 11/08/2022    EGFR 93 11/08/2022           Review of Systems   Constitutional: Positive for activity change and fatigue  Eyes: Positive for visual disturbance (Blind in one eye)  Cardiovascular: Negative for leg swelling  Gastrointestinal: Positive for diarrhea  Musculoskeletal: Positive for arthralgias, back pain and gait problem  Neurological: Positive for numbness (feet)  All other systems reviewed and are negative          Active Problems:   Patient Active Problem List   Diagnosis   • Chronic combined systolic and diastolic CHF (congestive heart failure) ([facility])   • S/P AVR   • Anemia   • Chronic midline low back pain   • Ischemic cardiomyopathy   • Ischemic leg pain   • Prostate cancer ([facility])   • CAD (coronary artery disease)   • Hypotension   • Lower extremity pain   • Urinary retention   • PAD (peripheral artery disease) ([facility])   • Port-A-Cath in place   • Malignant neoplasm metastatic to bone [facility])   • Embolism and 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 ([facility])   • Depression, recurrent ([facility])   • Weight loss   • Muscular deconditioning   • Cancer-related pain   • Palliative care patient   • Ambulatory dysfunction   • Peripheral neuropathy   • Financial problems       Past Medical History:   Past Medical History:   Diagnosis Date   • Cancer ([facility] [address] ) 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   • Coronary artery disease     S/p stenting to circumflex and RCA   • HFrEF (heart failure with reduced ejection fraction) ([facility] [address] ) 2021   • High cholesterol    • Prostate cancer [facility])        Surgical History:   Past Surgical History:   Procedure Laterality Date   • CARDIAC ELECTROPHYSIOLOGY PROCEDURE N/A 2021    Procedure: Implant ICD - bi ventricular;  Surgeon: Onesimo Cuba MD;  Location: [facility] CARDIAC CATH LAB; Service: Cardiology   • CARDIAC SURGERY     • FL THROMBOENDARTECTMY FEMORAL COMMON Right 2021    Procedure: ENDARTERECTOMY ARTERIAL FEMORAL;  Surgeon: Monster Dewitt DO;  Location: [facility];  Service: Vascular       Family History:  No family history on file  Cancer-related family history is not on file  Social History:   Social History     Socioeconomic History   • Marital status:       Spouse name: Not on file   • Number of children: 3   • Years of education: Not on file   • Highest education level: Not on file   Occupational History   • Not on file   Tobacco Use   • Smoking status: Former Smoker     Years: 20 00     Quit date: 2002     Years since quittin 3   • Smokeless tobacco: Never Used   Vaping Use   • Vaping Use: Never used   Substance and Sexual Activity   • Alcohol use: Not Currently   • Drug use: Not on file   • Sexual activity: Not on file   Other Topics Concern   • Not on file   Social History Narrative   • Not on file     Social Determinants of Health     Financial Resource Strain: Not on file   Food Insecurity: Not on file   Transportation Needs: Not on file   Physical Activity: Not on file   Stress: Not on file   Social Connections: Not on file   Intimate Partner Violence: Not on file   Housing Stability: Not on file       Current Medications:   Current Outpatient Medications   Medication Sig Dispense Refill   • acetaminophen (TYLENOL) 325 mg tablet Take 3 tablets (975 mg total) by mouth every 8 (eight) hours 90 tablet 0   • aspirin 81 mg chewable tablet Chew 1 tablet (81 mg total) daily 30 tablet 0   • atorvastatin (LIPITOR) 80 mg tablet TAKE 1 TABLET BY MOUTH DAILY WITH DINNER 90 tablet 1   • cetirizine (ZyrTEC) 5 MG tablet TAKE 1 TABLET (5 MG TOTAL) BY MOUTH DAILY  90 tablet 1   • clopidogrel (PLAVIX) 75 mg tablet Take 1 tablet (75 mg total) by mouth daily 90 tablet 1   • enzalutamide (Xtandi) 40 mg capsule Take 4 capsules (160 mg total) by mouth in the morning  120 capsule 10   • famotidine (PEPCID) 20 mg tablet TAKE 1 TABLET BY MOUTH EVERY DAY 90 tablet 1   • ferrous sulfate 325 (65 Fe) mg tablet TAKE 1 TABLET BY MOUTH TWICE A DAY WITH MEALS 180 tablet 1   • furosemide (LASIX) 20 mg tablet TAKE 1 TABLET BY MOUTH EVERY DAY 90 tablet 1   • gabapentin (NEURONTIN) 300 mg capsule Take 2 capsules (600 mg total) by mouth 2 (two) times a day To reduce nerve pain in feet  120 capsule 0   • leuprolide (Eligard) 22 5 mg 22  5MG/1 SYRINGE UNDER THE SKIN ONE TIME AS DIRECTED EVERY 3 MONTHS     • lidocaine-prilocaine (EMLA) cream Apply topically as needed (nerve pain) In Right leg 30 g 1   • loperamide (IMODIUM) 2 mg capsule TAKE 1 CAPSULE BY MOUTH 4 TIMES A DAY AS NEEDED FOR DIARRHEA   30 capsule 2   • losartan (COZAAR) 25 mg tablet TAKE 1/2 TABLET BY MOUTH EVERY DAY 45 tablet 1   • methocarbamol (ROBAXIN) 750 mg tablet Take 1 tablet (750 mg total) by mouth 3 (three) times a day with meals To reduce back pain and muscle pain 90 tablet 0   • metoprolol succinate (TOPROL-XL) 25 mg 24 hr tablet TAKE 1 TABLET BY MOUTH EVERY DAY 90 tablet 2   • [START ON 12/5/2022] oxyCODONE (ROXICODONE) 10 MG TABS Take 1 tablet (10 mg total) by mouth 3 (three) times a day as needed (cancer pain) Max Daily Amount: 30 mg Do not start before December 5, 2022  90 tablet 0   • oxyCODONE (ROXICODONE) 10 MG TABS Take 1 tablet (10 mg total) by mouth 3 (three) times a day as needed (cancer pain) Max Daily Amount: 30 mg 90 tablet 0   • prochlorperazine (COMPAZINE) 10 mg tablet Take 1 tablet (10 mg total) by mouth every 6 (six) hours as needed for nausea or vomiting 45 tablet 3   • abiraterone (ZYTIGA) 250 mg tablet Take 4 tablets (1,000 mg total) by mouth daily (Patient not taking: No sig reported) 30 tablet 2     No current facility-administered medications for this visit  Allergies: No Known Allergies    Physical Exam:  /60 (BP Location: Right arm, Patient Position: Sitting, Cuff Size: Large)   Pulse 81   Temp 97 8 °F (36 6 °C) (Tympanic)   Resp 16   Ht 5' 7" (1 702 m)   Wt 93 kg (205 lb)   SpO2 97%   BMI 32 11 kg/m²   Body surface area is 2 04 meters squared  Wt Readings from Last 3 Encounters:   11/10/22 93 kg (205 lb)   11/08/22 42 5 kg (93 lb 11 2 oz)   10/12/22 92 1 kg (203 lb)           Physical Exam  Constitutional:       General: He is not in acute distress  HENT:      Head: Normocephalic and atraumatic  Eyes:      General: No scleral icterus  Right eye: No discharge  Left eye: No discharge  Cardiovascular:      Rate and Rhythm: Normal rate and regular rhythm  Pulmonary:      Effort: Pulmonary effort is normal       Breath sounds: Normal breath sounds  Abdominal:      General: Bowel sounds are normal       Palpations: Abdomen is soft  Musculoskeletal:         General: Normal range of motion  Cervical back: Normal range of motion  Right lower leg: No edema  Left lower leg: No edema  Comments: Uses cane    Skin:     General: Skin is warm and dry  Neurological:      General: No focal deficit present  Mental Status: He is alert and oriented to person, place, and time  Psychiatric:         Mood and Affect: Mood normal          Behavior: Behavior normal          Assessment / Plan:    1  Malignant neoplasm metastatic to bone ([facility] [address] )    2  Prostate cancer ([facility] [address] )    3   Rising PSA level      The patient is a 70-year-old gentleman with widely metastatic prostate cancer who has progressed on multiple lines of therapy including chemotherapy with docetaxel  He is presently on treatment with Lupron, Xgeva and Galilea Cuna and is noted to have rising PSA  His PSA has more than doubled over the last 3 months  He does have PSMA positive disease as noted on most recent PET-CT scan  PSMA PET CT scan on 7/25/22 showed small focus of radiotracer uptake at the prostate suspicious for residual disease more conspicuous from the prior Axumin exam with innumerable osseous metastatic lesions with progression from prior Axumin PET-CT scan  There was a small intracranial focus of uptake at midline which was nonspecific    Patient's case was discussed with Dr Clarke Lagos  He would like patient to be referred to nuclear Medicine for consideration of Pluvitico since he has PSMA positive prostate cancer with rising PSA  I did review with patient that this treatment is given in the nuclear medicine department  He is familiar with this as he was previously treated with Kezia Castro, this was completed in April 2022  [facility] colleagues in [address] [place] [address] will determine if patient is a good candidate for this treatment  Patient was in agreement with this recommendation  I will bring him back to see Dr Clarke Lagos in 4-6 weeks depending on when he is scheduled to meet with nuclear medicine to discuss their recommendations  For now he will continue on current therapy without change  He was provided with samples of boost today  We have been in touch with our  who informs that she will be also contacting patient to see what additional support she can provide  Goals and Barriers:  Current Goal:  Prolong Survival from metastatic prostate cancer  Barriers: None  Patient's Capacity to Self Care:  Patient able to self care  Portions of the record may have been created with voice recognition software  Occasional wrong word or "sound a like" substitutions may have occurred due to the inherent limitations of voice recognition software    Read the chart carefully and recognize, using context, where substitutions have occurred

## 2022-11-09 NOTE — TELEPHONE ENCOUNTER
Appointment Confirmation (to confirm pre existing appointments - ONLY)  No need to route   Appointment with  Tamia Caller   Appointment date & time 11/10 2:30PM   Location [place]   Patient verbilized Understanding Yes     Edwin Van will be faxing VA Auth to [employer]

## 2022-11-10 ENCOUNTER — TELEPHONE (OUTPATIENT)
Dept: HEMATOLOGY ONCOLOGY | Facility: CLINIC | Age: 77
End: 2022-11-10

## 2022-11-10 ENCOUNTER — OFFICE VISIT (OUTPATIENT)
Dept: HEMATOLOGY ONCOLOGY | Facility: CLINIC | Age: 77
End: 2022-11-10

## 2022-11-10 ENCOUNTER — TELEPHONE (OUTPATIENT)
Dept: SURGICAL ONCOLOGY | Facility: CLINIC | Age: 77
End: 2022-11-10

## 2022-11-10 ENCOUNTER — PATIENT OUTREACH (OUTPATIENT)
Dept: CASE MANAGEMENT | Facility: HOSPITAL | Age: 77
End: 2022-11-10

## 2022-11-10 VITALS
SYSTOLIC BLOOD PRESSURE: 110 MMHG | BODY MASS INDEX: 32.18 KG/M2 | TEMPERATURE: 97.8 F | HEART RATE: 81 BPM | OXYGEN SATURATION: 97 % | DIASTOLIC BLOOD PRESSURE: 60 MMHG | WEIGHT: 205 LBS | RESPIRATION RATE: 16 BRPM | HEIGHT: 67 IN

## 2022-11-10 DIAGNOSIS — C79.51 MALIGNANT NEOPLASM METASTATIC TO BONE (HCC): Primary | ICD-10-CM

## 2022-11-10 DIAGNOSIS — R97.20 RISING PSA LEVEL: ICD-10-CM

## 2022-11-10 DIAGNOSIS — C61 PROSTATE CANCER (HCC): ICD-10-CM

## 2022-11-10 NOTE — PROGRESS NOTES
MSW phoned and spoke with patient today  Patient was originally scheduled on this MSW's caseload for outreach 11/18/2022 due to new concerns that was discussed with Manager- Asad this MSW reached out to patient today  MSW phoned and left a message for Navya Lord [phone] or [phone] Ext [phone] from [facility] to call this MSW back to discuss this patient's case  On Friday 9/30/2022 MSW received a response e-mail stating [facility] staff member will call patient  Patient states he did not receive a call  [place] services will initiate waiver services application  Patient asked this MSW to ask [facility] staff to call his son instead  This MSW requested this also in previous referral  MSW will ask Elizabeth Jaramillo once call is returned  Patient states he had ten thousand dollars in the bank  Patient states this money is going down quickly  Patient states he currently has four thousand dollars in the bank  Patient  states   he owns his home and rents the first floor for $800 a month  Patient states his son- Matty Hernandez lives on the third floor  Patient states he receives eight hundred dollars a month from social security  Patient states he pays mortgage on the house seven hundred seventy dollars  Patient states he pays one hundred fifty dollars for water and   Patient states he does have meals on wheels delivery everyday  Patient states he eats spaghetti for dinner  Patient states he currently needs someone to help him clean up the house  MSW notified patient house cleaning is an out of pocket expense unless he qualifies for waiver services where he can have a caregiver who can do light house cleaning  Jerry Marie assists with the application for waiver services  MSW will follow up with [facility]  Patient declined VNA services  Patient states he had VNA services in the patient and does not feel he needs VNA services at this time        Patient states he his son Matty Hernandez works for [employer] as a jay  Patient states Anson Alyse works the 11pm - 7am shift  Patient states Rodrigue sleeps during the day  Patient states he hardly sees his Faustino Moon  MSW provided patient this MSW's phone number  MSW will continue to follow

## 2022-11-10 NOTE — TELEPHONE ENCOUNTER
Reached out to Celina at [facility]  I left a message for Stalin Setting   She schedules the consultation for therapies through [facility]

## 2022-11-10 NOTE — TELEPHONE ENCOUNTER
Spoke to Ana Lilia at [place] at [phone] who will be faxing referral over to [phone] this morning for patient's appt  MIGUEL/ LARISA Flores Crooked this afternoon

## 2022-11-10 NOTE — TELEPHONE ENCOUNTER
Completed visit today alongside Bere Alford  Patient stated he is having trouble remembering things and believes he may have missed some of his "chemo medicine" the other day  Patient stated he does have a pill organizer but it is too small  States he pulls pills out individually  Just in conversation seems as if patient may take additional pills than prescribed as he stated he runs out of medications prior to them being available by pharmacy  Pt stated he eats cheerios for breakfast, which helps with his diarrhea  States he makes spaghetti for dinner because this can last him or 4-5 nights  He does receive meals on wheels but states they just bring him one meal   Patient was provided with free ensure's (strawberry and chocolate) and coupons for the protein shakes  States he also is having a lot of trouble keeping up with his home and that its getting very dirty and messy  He says that his son is working nights and does help him when he is able to  States he sleeps about 1-2 hours per night while watching tv because of usage of bathroom  Pt declines diarrhea, but states he is up urinating  Lasix is on patients med list, but he states he takes this is morning  Pt drove himself to visit today  Social work was contacted this morning and Juan Ramon Moran will be reaching out to patient today to discuss options for assistance

## 2022-11-12 ENCOUNTER — TELEPHONE (OUTPATIENT)
Dept: OTHER | Facility: OTHER | Age: 77
End: 2022-11-12

## 2022-11-12 NOTE — TELEPHONE ENCOUNTER
Patient had a consultation with home health provider on 11/12/2022  Facility would like to discuss visit with office on Monday

## 2022-11-15 NOTE — TELEPHONE ENCOUNTER
Called and spoke with Omar Solano from [facility]  She states patient uses a walker to get around the house and uses a motorized chair to get up and down the steps  Omar Solano states patient's chair is broken and wanted to let our office know  Called and spoke with patient  He states he was going up the steps in his chair and ran over something on the steps  His chair no longer moves  Patient advised to call number on the chair to have someone come out to fix it  Patient will have son do this when he wakes up

## 2022-11-17 ENCOUNTER — HOSPITAL ENCOUNTER (OUTPATIENT)
Dept: RADIOLOGY | Age: 77
Discharge: HOME | End: 2022-11-17

## 2022-11-17 DIAGNOSIS — C79.51 MALIGNANT NEOPLASM METASTATIC TO BONE (HCC): ICD-10-CM

## 2022-11-17 DIAGNOSIS — C61 PROSTATE CANCER (HCC): ICD-10-CM

## 2022-11-17 DIAGNOSIS — R97.20 RISING PSA LEVEL: ICD-10-CM

## 2022-11-21 ENCOUNTER — PATIENT OUTREACH (OUTPATIENT)
Dept: CASE MANAGEMENT | Facility: HOSPITAL | Age: 77
End: 2022-11-21

## 2022-11-21 NOTE — PROGRESS NOTES
MSW sent another referral for [facility] services and in the referral this MSW requested a call to discuss this case  MSW awaits a call from [phone]  MSW to continue to follow

## 2022-11-22 ENCOUNTER — TELEPHONE (OUTPATIENT)
Dept: PALLIATIVE MEDICINE | Facility: CLINIC | Age: 77
End: 2022-11-22

## 2022-11-22 NOTE — PROGRESS NOTES
Advanced Heart Failure Outpatient Progress Note Oliver Rene 68 y o  male MRN: [id number]    Encounter: [id number]      Assessment/Plan:    Patient Active Problem List    Diagnosis Date Noted   • PAD (peripheral artery disease) ([place] [address] ) 07/19/2021   • Lower extremity pain 07/11/2021   • Urinary retention 07/11/2021   • CAD (coronary artery disease) 07/10/2021   • Hypotension 07/10/2021   • Ischemic leg pain 07/07/2021   • Prostate cancer ([place] [address] ) 07/07/2021   • Chronic midline low back pain 07/01/2021   • Ischemic cardiomyopathy 07/01/2021   • Chronic combined systolic and diastolic CHF (congestive heart failure) ([place] [address] ) 06/22/2021   • S/P AVR 06/22/2021   • Anemia 06/22/2021   • Functional diarrhea 11/29/2022   • Ambulatory dysfunction 08/08/2022   • Peripheral neuropathy 08/08/2022   • Financial problems 08/08/2022   • Cancer-related pain 05/10/2022   • Palliative care patient 05/10/2022   • Malignant neoplasm metastatic to bone ([place] [address] ) 02/28/2022   • Embolism and 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 ([place] [address] ) 02/28/2022   • Depression, recurrent ([place] [address] ) 02/28/2022   • Weight loss 02/28/2022   • Muscular deconditioning 02/28/2022   • Port-A-Cath in place 10/05/2021          # Chronic HFrEF; LVEF 20%; LVIDd 5 9 cm; NYHA III; ACC/AHA Stage C  Etiology: ischemic +/- progression of valvular disease +/- chemotherapy (on abiraterone and leuprolide with unclear safety profile)    Euvolemic, warm and and well perfused on exam    Diagnostic:    TTE 06/14/2021 (at [facility] pre- and post-TAVR, per report): LVEF 20%  LVIDd 5 4 cm  "entire apex, mid and apical anterior septum, mid and apical inferior septum, mid and apical inferior wall, mid inferolateral wall, and mid lateral wall are akinetic " Trace MR and TR  Pre-TAVR: LVOT VTI 12 0 cm  JOSE 0 47 cm^2  Post: bioprosthetic AV valve present; JOSE 2 41 cm^2                  TTE 06/23/2021:   LVEF 20%  LVIDd 5 9 cm   Grade 2 DD  aneurysmal LV mid to apex "dyskinesis and aneurysmal deformity of the apical anterior, mid anteroseptal, apical inferior, apical septal, and apical wall(s) "   Normal RV size and RVSF  Mild MR  AV bioprosthesis with normal leaflet separation  Trace TR      TTE 11/17/21:  LVEF: 25%  LVIDd: 6 1cm  RV: normal size and systolic function  MR: mild to moderate   Other: Akinesis and aneurysmal deformity of the mid septum and apex  Grade 2 diastology  Ascending aorta 4cm  TAVR bioprosthetic valve, normally functioning  Mean gradient 3mmHg     Neurohormonal Blockade:  --Beta-Blocker: Metoprolol succinate 25 mg daily - decreased to 12 5mg daily  --ACEi, ARB or ARNi: Entresto 24/26 mg BID - switched to losartan 25mg daily due to low blood pressure - decreased to 12 5mg daily due to lightheadedness    (or SVR reduction)  --Aldosterone Receptor Blocker: Spironolactone 12 5 mg daily - off due to low BP  --SGLT2-I:   --Diuretic: Lasix 40 mg daily - decreased to 20mg daily due to lightheadedness     Sudden Cardiac Death Risk Reduction:  --ICD: s/p SJM BIV ICD 12/23/21  Interrogation 10/12/22: AP 63% BVP 98%  No significant high rate episodes  CoreVue within normal   Interrogation 7/13/2022:  A paced 59% bi V paced 98%  No significant high rate episodes  Core view within normal       Electrical Resynchronization:  -- LBBB QRSd 158 msec preBIV; QRSd 148msec post BIV     Advanced Therapies (If appropriate): --We will continue to monitor     # Ventricular tachycardia  Detected on device interrogation 4/29/2022 status post ATP  No recurrence  Maintained on beta blocker  # Prostate cancer with bony metastases  Receiving chemotherapy through the [place]  Taking SQ leuprolide (Eligard) and abiraterone (Zytiga) - safety of abiraterone in patients with LVEF <50% not established per FDA  Sentinel-analysis of drug noted increased incidence and relative risk of CV toxicity      # Coronary artery disease  Without active chest pain  S/pstenting to circumflex and RCA in 2001    [facility] 06/09/2021 (per documentation):"diffuse moderate prox and mid disease/ISR, severe OM lesion and total mid LAD "  Rx: aspirin, plavix, statin     # Aortic stenosis  S/p TAVR (Evolut Pro+ 29mm) on 06/14/2021 at [facility]  Normally functioning on last echo 11/2021    # RLE ischemia  LEAD with >75% stenosis of the CFA/SFA  s/p right femoral endarterectomy 7/9/21  Continues on ASA, Plavix, and statin  Follows with Dr Rolanda King      # Hyperlipidemia     # History of atrial tachycardia: s/p ablation at OSH in 2018  # Benign prostatic hyperplasia      TODAY'S PLAN:  We will price check farxiga  Continue current cardiac medications  We will obtain echocardiogram to reassess EF and TAVR  Limit ibuprofen use, follow up with PCP regarding pain management  2g sodium diet  2L fluid restriction  Daily weights  Physical activities/exercise as tolerated  Follow-up in 4 months or sooner as needed      HPI:   Ismael Robertson is a 72-year-old man with a PMH as aboe who presents for hospital follow-up      Presented to [facility] on 06/03/2021 with chest pressure before chemotherapy treatment for prostate cancer  Sent for testing and noted to be in acute CHF exacerbation and was diuresed  Was then transferred to [facility] in [facility]  TTE showed "severely reduced LVEF with LV apical aneurysm and septal/AW AK, RV normal, Vmax at 3 5 (however, it was read as moderate AS and preserved EF in 2019 so likely this is progression of AS in addition to interim AWMI) "  [facility] also done which revealed "diffuse moderate prox and mid disease/ISR, severe OM lesion and total mid LAD "     Transferred to [facility] on 06/13 for TAVR  TAVR completed on 06/14, and patient was discharged home on 06/15/2021  Started on metoprolol succinate this admission (tartrate discontinued)       Admitted to [facility] from 06/22 to 06/25/2021 after presenting with worsening SOB and LE edema for 3 days   WAS without discharge medications from [facility] admission for about 1 week (including Lasix)  NT-proBNP 5157  Cardiology was consulted, TTE was completed, and IV diuresis was started  Reduced LVEF was confirmed and aneurysmal wall motion of apical segments  Transitioned to PO diuretics on day of discharge  Started on Entresto and fit with LifeVest  Lost 10 lbs this admission       07/06/2021: Patient presents for hospital follow-up  Primary complaint today is of dizziness and right LE pain/numbeness and right foot wound  Reports decreased appetite and increased frustration with attempting to adhere to low sodium diet  Did receive "Living With Heart Failure" booklet during admission  Prior to admissions, often would eat canned pastas for his meals  Considering getting Meals of Wheels  Unclear if patient has been taking both tartrate and succinate since discharge  Not wearing LifeVest; dislikes wearing it and left device at home today  Has not been completing daily weights; does not have scale at home  Patient organizes his own meds; VNA in home 2 times weekly  7/16/2021: Patient is here for follow-up  Patient hospitalized 6/17/20July 7-13 due to right lower extremity ischemia  Underwent right femoral endarterectomy on June 9  Furosemide Entresto held prior to surgery due to low blood pressure  Eventually discharged on low-dose losartan 12 5 mg daily due to soft blood pressure  Furosemide 40 mg daily resumed  Patient reports right lower extremity pain and difficulty bearing weight on the right leg  Otherwise denies shortness of breath, chest pain or lightheadedness  9/15/21: Here for follow up  Overall doing good  Mainly complaining of residual numbness/tingling on right leg  No shortness of breath, chest pain, palpitations or lightheadedness  1/25/22: Here for follow up  S/p BIV ICD placement 12/23/21  Reports lightheadedness "all the time"  No feeling faint  Spinning sensation when laying down  Mainly limited by leg pain, not doing much walking   Denied shortness of breath walking from the parking lot to the office and from the waiting area to exam room  4/26/22: Here for follow up  Decreased furosemide to 20mg daily on last visit  4/18/22 Na 141 K 3 9 creatinine 0 82  One episode of vertigo about a week ago  Still with intermittent episodes of lightheadedness  Doing PT twice a week  Still having lower extremity nerve pains    7/27/22: Here for follow up  Mainly limited by foot pain  Increased calorie intake, eating more ice cream     Interval History:   11/22/22: Here for follow up  Getting new treatment for prostate cancer (Pluvitico)  Getting right buttock pain and right shoulder pain, taking advil 400mg 3x/day in between tylenol  Occasional lightheadedness  No shortness of breath on current level of exertion, limited walking due to foot pain but able to go up a flight of stairs slowly without shortness of breath, feels tired  Review of Systems   Constitutional: Negative for chills and fever  HENT: Negative for ear pain and sore throat  Eyes: Negative for pain and visual disturbance  Respiratory: Negative for cough, chest tightness and shortness of breath  Cardiovascular: Negative for chest pain, palpitations and leg swelling  Gastrointestinal: Negative for abdominal distention, abdominal pain, nausea and vomiting  Genitourinary: Negative for dysuria and hematuria  Musculoskeletal: Negative for arthralgias and back pain  Skin: Negative for color change and rash  Neurological: Negative for dizziness, seizures, syncope and light-headedness  All other systems reviewed and are negative  Past Medical History:   Diagnosis Date   • Cancer ([facility] ) 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   • Coronary artery disease 2001    S/p stenting to circumflex and RCA   • HFrEF (heart failure with reduced ejection fraction) ([facility] ) 06/14/2021   • High cholesterol    • Prostate cancer ([facility])          No Known Allergies        Current Outpatient Medications:   • acetaminophen (TYLENOL) 325 mg tablet, Take 3 tablets (975 mg total) by mouth every 8 (eight) hours (Patient taking differently: Take 650 mg by mouth 3 (three) times a day), Disp: 90 tablet, Rfl: 0  •  aspirin 81 mg chewable tablet, Chew 1 tablet (81 mg total) daily, Disp: 30 tablet, Rfl: 0  •  atorvastatin (LIPITOR) 80 mg tablet, Take 1 tablet (80 mg total) by mouth daily with dinner, Disp: 90 tablet, Rfl: 1  •  cetirizine (ZyrTEC) 5 MG tablet, TAKE 1 TABLET (5 MG TOTAL) BY MOUTH DAILY  , Disp: 90 tablet, Rfl: 1  •  clopidogrel (PLAVIX) 75 mg tablet, Take 1 tablet (75 mg total) by mouth daily, Disp: 90 tablet, Rfl: 1  •  enzalutamide (Xtandi) 40 mg capsule, Take 4 capsules (160 mg total) by mouth in the morning , Disp: 120 capsule, Rfl: 10  •  famotidine (PEPCID) 20 mg tablet, TAKE 1 TABLET BY MOUTH EVERY DAY, Disp: 90 tablet, Rfl: 1  •  ferrous sulfate 325 (65 Fe) mg tablet, TAKE 1 TABLET BY MOUTH TWICE A DAY WITH MEALS, Disp: 180 tablet, Rfl: 1  •  furosemide (LASIX) 20 mg tablet, TAKE 1 TABLET BY MOUTH EVERY DAY, Disp: 90 tablet, Rfl: 1  •  gabapentin (NEURONTIN) 300 mg capsule, Take 2 capsules (600 mg total) by mouth 2 (two) times a day To reduce nerve pain in feet , Disp: 120 capsule, Rfl: 0  •  ibuprofen (MOTRIN) 200 mg tablet, Take 200 mg by mouth every 4 (four) hours as needed for mild pain, Disp: , Rfl:   •  losartan (COZAAR) 25 mg tablet, TAKE 1/2 TABLET BY MOUTH EVERY DAY, Disp: 45 tablet, Rfl: 1  •  methocarbamol (ROBAXIN) 750 mg tablet, Take 1 tablet (750 mg total) by mouth 3 (three) times a day with meals To reduce back pain and muscle pain, Disp: 90 tablet, Rfl: 0  •  metoprolol succinate (TOPROL-XL) 25 mg 24 hr tablet, TAKE 1 TABLET BY MOUTH EVERY DAY (Patient taking differently: 1/2 tablet daily), Disp: 90 tablet, Rfl: 2  •  [START ON 12/5/2022] oxyCODONE (ROXICODONE) 10 MG TABS, Take 1 tablet (10 mg total) by mouth 3 (three) times a day as needed (cancer pain) Max Daily Amount: 30 mg Do not start before 2022 , Disp: 90 tablet, Rfl: 0  •  prochlorperazine (COMPAZINE) 10 mg tablet, Take 1 tablet (10 mg total) by mouth every 6 (six) hours as needed for nausea or vomiting, Disp: 45 tablet, Rfl: 3  •  leuprolide (Eligard) 22 5 mg, 22 5MG/1 SYRINGE UNDER THE SKIN ONE TIME AS DIRECTED EVERY 3 MONTHS (Patient not taking: Reported on 2022), Disp: , Rfl:   •  lidocaine-prilocaine (EMLA) cream, Apply topically as needed (nerve pain) In Right leg, Disp: 30 g, Rfl: 1  •  loperamide (IMODIUM) 2 mg capsule, Take 1 capsule (2 mg total) by mouth 4 (four) times a day as needed for diarrhea, Disp: 30 capsule, Rfl: 2    Social History     Socioeconomic History   • Marital status:      Spouse name: Not on file   • Number of children: 3   • Years of education: Not on file   • Highest education level: Not on file   Occupational History   • Not on file   Tobacco Use   • Smoking status: Former     Years:      Types: Cigarettes     Quit date: 2002     Years since quittin 4   • Smokeless tobacco: Never   Vaping Use   • Vaping Use: Never used   Substance and Sexual Activity   • Alcohol use: Not Currently   • Drug use: Not on file   • Sexual activity: Not on file   Other Topics Concern   • Not on file   Social History Narrative   • Not on file     Social Determinants of Health     Financial Resource Strain: Not on file   Food Insecurity: Not on file   Transportation Needs: Not on file   Physical Activity: Not on file   Stress: Not on file   Social Connections: Not on file   Intimate Partner Violence: Not on file   Housing Stability: Not on file       History reviewed  No pertinent family history  Physical Exam:    Vitals:   Vitals:    22 0816   BP: 102/50   Pulse:    SpO2:        Physical Exam  Constitutional:       General: He is not in acute distress  Appearance: Normal appearance  HENT:      Head: Normocephalic and atraumatic        Mouth/Throat:      Mouth: Mucous membranes are moist    Eyes:      General: No scleral icterus  Extraocular Movements: Extraocular movements intact  Cardiovascular:      Rate and Rhythm: Normal rate and regular rhythm  Heart sounds: S1 normal and S2 normal  No murmur heard  No friction rub  No gallop  Comments: Nonelevated JVP  Trace pitting edema bilaterally  Pulmonary:      Breath sounds: Normal breath sounds  Abdominal:      General: There is no distension  Palpations: Abdomen is soft  Tenderness: There is no abdominal tenderness  There is no guarding or rebound  Musculoskeletal:         General: Normal range of motion  Cervical back: Neck supple  Skin:     General: Skin is warm and dry  Capillary Refill: Capillary refill takes less than 2 seconds  Neurological:      General: No focal deficit present  Mental Status: He is alert and oriented to person, place, and time  Psychiatric:         Mood and Affect: Mood normal          Labs & Results:    Lab Results   Component Value Date    SODIUM 138 11/08/2022    K 4 1 11/08/2022     (H) 11/08/2022    CO2 24 11/08/2022    BUN 14 11/08/2022    CREATININE 0 67 11/08/2022    GLUC 86 11/08/2022    CALCIUM 7 9 (L) 11/08/2022     Lab Results   Component Value Date    WBC 5 76 11/08/2022    HGB 10 8 (L) 11/08/2022    HCT 32 8 (L) 11/08/2022    MCV 92 11/08/2022     11/08/2022     Lab Results   Component Value Date    NTBNP 4,279 (H) 02/14/2022      Lab Results   Component Value Date    CHOLESTEROL 146 09/30/2021     Lab Results   Component Value Date    HDL 35 (L) 09/30/2021     Lab Results   Component Value Date    TRIG 249 (H) 09/30/2021     No results found for: [facility]    EKG personally reviewed by Omar Kelsey  Counseling / Coordination of Care  Time spent today 25 minutes  Greater than 50% of total time was spent with the patient and / or family counseling and / or coordination of care   We went over current diagnosis, most recent studies and any changes in treatment  Thank you for the opportunity to participate in the care of this patient      Checo Villalobos MD  ADVANCED HEART FAILURE AND MECHANICAL CIRCULATORY SUPPORT  [facility]

## 2022-11-25 DIAGNOSIS — I42.9 CARDIOMYOPATHY, UNSPECIFIED TYPE (HCC): ICD-10-CM

## 2022-11-25 RX ORDER — ATORVASTATIN CALCIUM 80 MG/1
80 TABLET, FILM COATED ORAL
Qty: 90 TABLET | Refills: 1 | Status: SHIPPED | OUTPATIENT
Start: 2022-11-25

## 2022-11-29 ENCOUNTER — OFFICE VISIT (OUTPATIENT)
Dept: INTERNAL MEDICINE CLINIC | Facility: CLINIC | Age: 77
End: 2022-11-29

## 2022-11-29 VITALS
HEIGHT: 67 IN | DIASTOLIC BLOOD PRESSURE: 82 MMHG | OXYGEN SATURATION: 100 % | BODY MASS INDEX: 32.65 KG/M2 | SYSTOLIC BLOOD PRESSURE: 130 MMHG | WEIGHT: 208 LBS | TEMPERATURE: 98.4 F | HEART RATE: 102 BPM

## 2022-11-29 DIAGNOSIS — K59.1 FUNCTIONAL DIARRHEA: ICD-10-CM

## 2022-11-29 DIAGNOSIS — Z23 NEED FOR INFLUENZA VACCINATION: ICD-10-CM

## 2022-11-29 DIAGNOSIS — Z00.00 MEDICARE ANNUAL WELLNESS VISIT, SUBSEQUENT: Primary | ICD-10-CM

## 2022-11-29 DIAGNOSIS — F33.9 DEPRESSION, RECURRENT (HCC): ICD-10-CM

## 2022-11-29 DIAGNOSIS — Z51.5 PALLIATIVE CARE PATIENT: Chronic | ICD-10-CM

## 2022-11-29 RX ORDER — IBUPROFEN 200 MG
200 TABLET ORAL EVERY 4 HOURS PRN
COMMUNITY

## 2022-11-29 RX ORDER — LOPERAMIDE HYDROCHLORIDE 2 MG/1
2 CAPSULE ORAL 4 TIMES DAILY PRN
Qty: 30 CAPSULE | Refills: 2 | Status: SHIPPED | OUTPATIENT
Start: 2022-11-29

## 2022-11-29 NOTE — PROGRESS NOTES
Assessment and Plan:     Problem List Items Addressed This Visit        Digestive    Functional diarrhea     Patient has off and on diarrhea due to polypharmacy, he denies any dark or bloody stools  Imodium helps control the frequency  Patient advised to hydrate with electrolyte solution to maintain electrolyte balance when he is experiencing diarrhea and follow up if diarrhea becomes more persistent  Relevant Medications    loperamide (IMODIUM) 2 mg capsule       Other    Palliative care patient (Chronic)     Mr Michael Gregory has been experiencing increased pain over the last week  He has pain in his right buttock radiating down his posterior leg and pain in his upper back around the scapular border  He states he has added ibuprofen 200 mg q 8 hours along with his usual regimen of tylenol and oxycodone every 8 hours which he takes together  On physical exam straight leg test was negative and there was no tenderness to palpation over lumbar spine or paraspinal muscles  Patient had no tenderness on palpation over his scapular or trapezius muscle  He did state some pain on adduction of the scapula  Patient took his tylenol and oxycodone shortly before the appointment which may be the reason for negative physical exam     The addition of ibuprophen is helping keep his pain in control, without the pain meds he states his pain is 10/10 and he is unable to function  He has tried topical creams and patches in the past with no relief  Patient was kidney function is at baseline, therefore he can continue low dose ibuprofen at this time  He is advised to follow up with palliative care on 12/20 to optimize is pain control regimen  [facility]     Patient scored 12 on the PHQ9 score  He states it is a little difficult for him to manage at this time but his son is working on becoming his full time caretaker and he feels he will have the support he needs at that time   He refused pharmacological therapy at this time  Other Visit Diagnoses     Medicare annual wellness visit, subsequent    -  Primary    Need for influenza vaccination        Relevant Orders    influenza vaccine, high-dose, PF 0 7 mL (FLUZONE HIGH-DOSE) (Completed)        BMI Counseling: Body mass index is 32 58 kg/m²  Follow-up plan was not completed due to patient receiving palliative care  Preventive health issues were discussed with patient, and age appropriate screening tests were ordered as noted in patient's After Visit Summary  Personalized health advice and appropriate referrals for health education or preventive services given if needed, as noted in patient's After Visit Summary  History of Present Illness:     Patient presents for a Medicare Wellness Visit    Mr Rene is a 68 Y  O  male wit PMH of stage IV prostate cancer, CAD, CHF, and PAD, he presented today for a medicare annual wellness exam  He has been having increased pain in his right buttock radiating down his leg and some pain in his upper back in the scapular region  His lifestyle is sedentary, he moves around the house with a cane and at times with a manual wheelchair  He states the pain has started about a week ago and is controlled with addition of ibuprofen to his pain medication regimen  He lives with his son but manages his medications independently but admits it can get challenging at times as he has many medications that he has to take multiple times a day  He is looking forward to his son becoming his caretaker in the future and helping him with his medications and doctors appointments  He following up with Oncology for regular cancer treatments and palliative for pain management  He denies any recent sick contacts, fevers, chills, nausea or vomiting        Patient Care Team:  Kelli Dasilva MD as PCP - General (Internal Medicine)  Celina Wallis DO (Vascular Surgery)  YUNIOR Garcia as Case Manager (Care Coordination)     Review of Systems:     Review of Systems   Constitutional: Negative for activity change, appetite change, chills and fever  HENT: Positive for congestion and rhinorrhea  Negative for postnasal drip, sinus pressure and sore throat  Eyes: Negative for pain, discharge and visual disturbance  Respiratory: Positive for shortness of breath (only when climbing stairs)  Negative for apnea and chest tightness  Cardiovascular: Negative for chest pain and palpitations  Gastrointestinal: Positive for diarrhea  Negative for abdominal distention, blood in stool, nausea and vomiting  Genitourinary: Negative for difficulty urinating, flank pain, hematuria and urgency  Musculoskeletal: Positive for myalgias (right upper back)  Negative for back pain  Skin: Negative for color change and rash  Neurological: Positive for numbness (right foot)  Negative for tremors and headaches  Psychiatric/Behavioral: Negative for agitation and confusion          Problem List:     Patient Active Problem List   Diagnosis   • Chronic combined systolic and diastolic CHF (congestive heart failure) ([facility])   • S/P AVR   • Anemia   • Chronic midline low back pain   • Ischemic cardiomyopathy   • Ischemic leg pain   • Prostate cancer ([place] [address] )   • CAD (coronary artery disease)   • Hypotension   • Lower extremity pain   • Urinary retention   • PAD (peripheral artery disease) ([place] [address] )   • Port-A-Cath in place   • Malignant neoplasm metastatic to bone [facility])   • Embolism and 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 ([facility])   • Depression, recurrent ([facility])   • Weight loss   • Muscular deconditioning   • Cancer-related pain   • Palliative care patient   • Ambulatory dysfunction   • Peripheral neuropathy   • Financial problems   • Functional diarrhea      Past Medical and Surgical History:     Past Medical History:   Diagnosis Date   • Cancer ([place] [address] ) 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   • Coronary artery disease 2001    S/p stenting to circumflex and RCA   • HFrEF (heart failure with reduced ejection fraction) ([facility] ) 2021   • High cholesterol    • Prostate cancer [facility])      Past Surgical History:   Procedure Laterality Date   • CARDIAC ELECTROPHYSIOLOGY PROCEDURE N/A 2021    Procedure: Implant ICD - bi ventricular;  Surgeon: Jonathan Kaur MD;  Location: [facility] CARDIAC CATH LAB; Service: Cardiology   • CARDIAC SURGERY     • VT THROMBOENDARTECTMY FEMORAL COMMON Right 2021    Procedure: ENDARTERECTOMY ARTERIAL FEMORAL;  Surgeon: Anushka Bonner DO;  Location: [facility];  Service: Vascular      Family History:     No family history on file  Social History:     Social History     Socioeconomic History   • Marital status:       Spouse name: None   • Number of children: 3   • Years of education: None   • Highest education level: None   Occupational History   • None   Tobacco Use   • Smoking status: Former     Years: 20 00     Types: Cigarettes     Quit date: 2002     Years since quittin 4   • Smokeless tobacco: Never   Vaping Use   • Vaping Use: Never used   Substance and Sexual Activity   • Alcohol use: Not Currently   • Drug use: None   • Sexual activity: None   Other Topics Concern   • None   Social History Narrative   • None     Social Determinants of Health     Financial Resource Strain: Not on file   Food Insecurity: Not on file   Transportation Needs: Not on file   Physical Activity: Not on file   Stress: Not on file   Social Connections: Not on file   Intimate Partner Violence: Not on file   Housing Stability: Not on file      Medications and Allergies:     Current Outpatient Medications   Medication Sig Dispense Refill   • acetaminophen (TYLENOL) 325 mg tablet Take 3 tablets (975 mg total) by mouth every 8 (eight) hours (Patient taking differently: Take 650 mg by mouth 3 (three) times a day) 90 tablet 0   • aspirin 81 mg chewable tablet Chew 1 tablet (81 mg total) daily 30 tablet 0   • atorvastatin (LIPITOR) 80 mg tablet Take 1 tablet (80 mg total) by mouth daily with dinner 90 tablet 1   • cetirizine (ZyrTEC) 5 MG tablet TAKE 1 TABLET (5 MG TOTAL) BY MOUTH DAILY  90 tablet 1   • clopidogrel (PLAVIX) 75 mg tablet Take 1 tablet (75 mg total) by mouth daily 90 tablet 1   • enzalutamide (Xtandi) 40 mg capsule Take 4 capsules (160 mg total) by mouth in the morning  120 capsule 10   • famotidine (PEPCID) 20 mg tablet TAKE 1 TABLET BY MOUTH EVERY DAY 90 tablet 1   • ferrous sulfate 325 (65 Fe) mg tablet TAKE 1 TABLET BY MOUTH TWICE A DAY WITH MEALS 180 tablet 1   • furosemide (LASIX) 20 mg tablet TAKE 1 TABLET BY MOUTH EVERY DAY 90 tablet 1   • gabapentin (NEURONTIN) 300 mg capsule Take 2 capsules (600 mg total) by mouth 2 (two) times a day To reduce nerve pain in feet   120 capsule 0   • ibuprofen (MOTRIN) 200 mg tablet Take 200 mg by mouth every 4 (four) hours as needed for mild pain     • lidocaine-prilocaine (EMLA) cream Apply topically as needed (nerve pain) In Right leg 30 g 1   • loperamide (IMODIUM) 2 mg capsule Take 1 capsule (2 mg total) by mouth 4 (four) times a day as needed for diarrhea 30 capsule 2   • losartan (COZAAR) 25 mg tablet TAKE 1/2 TABLET BY MOUTH EVERY DAY 45 tablet 1   • methocarbamol (ROBAXIN) 750 mg tablet Take 1 tablet (750 mg total) by mouth 3 (three) times a day with meals To reduce back pain and muscle pain 90 tablet 0   • metoprolol succinate (TOPROL-XL) 25 mg 24 hr tablet TAKE 1 TABLET BY MOUTH EVERY DAY 90 tablet 2   • [START ON 12/5/2022] oxyCODONE (ROXICODONE) 10 MG TABS Take 1 tablet (10 mg total) by mouth 3 (three) times a day as needed (cancer pain) Max Daily Amount: 30 mg Do not start before December 5, 2022  90 tablet 0   • oxyCODONE (ROXICODONE) 10 MG TABS Take 1 tablet (10 mg total) by mouth 3 (three) times a day as needed (cancer pain) Max Daily Amount: 30 mg 90 tablet 0   • prochlorperazine (COMPAZINE) 10 mg tablet Take 1 tablet (10 mg total) by mouth every 6 (six) hours as needed for nausea or vomiting 45 tablet 3   • leuprolide (Eligard) 22 5 mg 22  5MG/1 SYRINGE UNDER THE SKIN ONE TIME AS DIRECTED EVERY 3 MONTHS (Patient not taking: Reported on 11/29/2022)       No current facility-administered medications for this visit  No Known Allergies   Immunizations:     Immunization History   Administered Date(s) Administered   • COVID-19 MODERNA VACC 0 5 ML IM 01/11/2021, 02/08/2021, 09/08/2021   • H1N1, All Formulations 03/25/2010   • INFLUENZA 11/18/2002, 12/16/2003, 12/02/2004, 12/20/2005, 10/01/2006, 10/01/2007, 12/12/2007, 09/25/2009, 01/06/2011, 02/02/2012, 12/14/2012   • Influenza, high dose seasonal 0 7 mL 02/28/2022, 11/29/2022   • Influenza, seasonal, injectable 10/09/2015, 03/27/2017, 09/27/2017, 10/24/2018, 01/13/2020   • Pneumococcal 07/13/2004, 12/14/2012   • Pneumococcal Conjugate 13-Valent 10/09/2015   • Td (adult), Unspecified 12/16/2003      Health Maintenance:         Topic Date Due   • Hepatitis C Screening  Never done         Topic Date Due   • Hepatitis B Vaccine (1 of 3 - 3-dose series) Never done   • Pneumococcal Vaccine: 65+ Years (2 - PPSV23 if available, else PCV20) 10/09/2016   • COVID-19 Vaccine (4 - Booster for Moderna series) 12/01/2021      Medicare Screening Tests and Risk Assessments:     Odalis Clay is here for his Initial Wellness visit  Last Medicare Wellness visit information reviewed, patient interviewed and updates made to the record today  Health Risk Assessment:   Patient rates overall health as good  Patient feels that their physical health rating is slightly worse  Patient is dissatisfied with their life  Eyesight was rated as slightly worse  Hearing was rated as much worse  Patient feels that their emotional and mental health rating is same  Patients states they are never, rarely angry  Patient states they are often unusually tired/fatigued  Pain experienced in the last 7 days has been a lot  Patient's pain rating has been 10/10   Patient states that he has experienced no weight loss or gain in last 6 months  Depression Screening:   PHQ-9 Score: 12      Fall Risk Screening: In the past year, patient has experienced: history of falling in past year    Number of falls: 1  Injured during fall?: No      Home Safety:  Patient has trouble with stairs inside or outside of their home  Patient has working smoke alarms and has working carbon monoxide detector  Home safety hazards include: none  Nutrition:   Current diet is Regular  Medications:   Patient is not currently taking any over-the-counter supplements  Patient is not able to manage medications  Activities of Daily Living (ADLs)/Instrumental Activities of Daily Living (IADLs):   Walk and transfer into and out of bed and chair?: Yes  Dress and groom yourself?: Yes    Bathe or shower yourself?: Yes    Feed yourself? Yes  Do your laundry/housekeeping?: Yes  Manage your money, pay your bills and track your expenses?: No  Make your own meals?: No    Do your own shopping?: No    ADL comments: Patient states he has help from his son    Previous Hospitalizations:   Any hospitalizations or ED visits within the last 12 months?: No      Advance Care Planning:     Five wishes given: Yes      PREVENTIVE SCREENINGS      Cardiovascular Screening:    General: Screening Current      Diabetes Screening:     General: Screening Current      Colorectal Cancer Screening:     General: Screening Current      Prostate Cancer Screening:    General: History Prostate Cancer and Screening Not Indicated      Abdominal Aortic Aneurysm (AAA) Screening:    Risk factors include: tobacco use        Lung Cancer Screening:     General: Screening Not Indicated    Screening, Brief Intervention, and Referral to Treatment (SBIRT)    Screening  Typical number of drinks in a day: 0  Typical number of drinks in a week: 0  Interpretation: Low risk drinking behavior      Single Item Drug Screening:  How often have you used an illegal drug (including marijuana) or a prescription medication for non-medical reasons in the past year? never    Single Item Drug Screen Score: 0  Interpretation: Negative screen for possible drug use disorder    Review of Current Opioid Use    Opioid Risk Tool (ORT) Interpretation: Complete Opioid Risk Tool (ORT)    No results found  Physical Exam:     /82 (BP Location: Left arm, Patient Position: Sitting, Cuff Size: Standard)   Pulse 102   Temp 98 4 °F (36 9 °C) (Tympanic)   Ht 5' 7" (1 702 m)   Wt 94 3 kg (208 lb)   SpO2 100%   BMI 32 58 kg/m²     Physical Exam  Vitals reviewed  Constitutional:       General: He is not in acute distress  Appearance: He is not ill-appearing or toxic-appearing  HENT:      Head: Normocephalic and atraumatic  Comments: adentulous      Right Ear: Tympanic membrane, ear canal and external ear normal  There is no impacted cerumen  Left Ear: Tympanic membrane, ear canal and external ear normal  There is no impacted cerumen  Nose: Rhinorrhea present  No congestion  Mouth/Throat:      Mouth: Mucous membranes are dry  Pharynx: Oropharynx is clear  No oropharyngeal exudate or posterior oropharyngeal erythema  Eyes:      General: No scleral icterus  Right eye: No discharge  Left eye: No discharge  Extraocular Movements: Extraocular movements intact  Conjunctiva/sclera: Conjunctivae normal    Cardiovascular:      Rate and Rhythm: Normal rate and regular rhythm  Heart sounds: No murmur heard  Pulmonary:      Effort: Pulmonary effort is normal  No respiratory distress  Breath sounds: No stridor  No wheezing  Abdominal:      General: Abdomen is flat  Bowel sounds are normal  There is no distension  Palpations: Abdomen is soft  There is no mass  Tenderness: There is no abdominal tenderness     Musculoskeletal:         General: No swelling or tenderness (no tenderness upon palpation of the lumbar spine, paraspinal muscles or right trapezius and spinatus muscles)  Normal range of motion  Right lower leg: No edema  Left lower leg: No edema  Comments: Straight leg test negative   Skin:     General: Skin is warm  Capillary Refill: Capillary refill takes 2 to 3 seconds  Coloration: Skin is not jaundiced or pale  Findings: No bruising  Neurological:      General: No focal deficit present  Mental Status: He is alert and oriented to person, place, and time  Mental status is at baseline  Cranial Nerves: No cranial nerve deficit  Psychiatric:         Mood and Affect: Mood normal          Behavior: Behavior normal          Thought Content:  Thought content normal           Leena Laureano MD

## 2022-11-29 NOTE — ASSESSMENT & PLAN NOTE
Mr Yana Kraft has been experiencing increased pain over the last week  He has pain in his right buttock radiating down his posterior leg and pain in his upper back around the scapular border  He states he has added ibuprofen 200 mg q 8 hours along with his usual regimen of tylenol and oxycodone every 8 hours which he takes together  On physical exam straight leg test was negative and there was no tenderness to palpation over lumbar spine or paraspinal muscles  Patient had no tenderness on palpation over his scapular or trapezius muscle  He did state some pain on adduction of the scapula  Patient took his tylenol and oxycodone shortly before the appointment which may be the reason for negative physical exam     The addition of ibuprophen is helping keep his pain in control, without the pain meds he states his pain is 10/10 and he is unable to function  He has tried topical creams and patches in the past with no relief  Patient was kidney function is at baseline, therefore he can continue low dose ibuprofen at this time  He is advised to follow up with palliative care on 12/20 to optimize is pain control regimen

## 2022-11-29 NOTE — ASSESSMENT & PLAN NOTE
Patient scored 12 on the PHQ9 score  He states it is a little difficult for him to manage at this time but his son is working on becoming his full time caretaker and he feels he will have the support he needs at that time  He refused pharmacological therapy at this time

## 2022-11-29 NOTE — PATIENT INSTRUCTIONS
Medicare Preventive Visit Patient Instructions  Thank you for completing your Welcome to Medicare Visit or Medicare Annual Wellness Visit today  Your next wellness visit will be due in one year (11/30/2023)  The screening/preventive services that you may require over the next 5-10 years are detailed below  Some tests may not apply to you based off risk factors and/or age  Screening tests ordered at today's visit but not completed yet may show as past due  Also, please note that scanned in results may not display below  Preventive Screenings:  Service Recommendations Previous Testing/Comments   Colorectal Cancer Screening  · Colonoscopy    · Fecal Occult Blood Test (FOBT)/Fecal Immunochemical Test (FIT)  · Fecal DNA/Cologuard Test  · Flexible Sigmoidoscopy Age: 39-70 years old   Colonoscopy: every 10 years (May be performed more frequently if at higher risk)  OR  FOBT/FIT: every 1 year  OR  Cologuard: every 3 years  OR  Sigmoidoscopy: every 5 years  Screening may be recommended earlier than age 39 if at higher risk for colorectal cancer  Also, an individualized decision between you and your healthcare provider will decide whether screening between the ages of 74-80 would be appropriate   Colonoscopy: Not on file  FOBT/FIT: Not on file  Cologuard: Not on file  Sigmoidoscopy: Not on file          Prostate Cancer Screening Individualized decision between patient and health care provider in men between ages of 53-78   Medicare will cover every 12 months beginning on the day after your 50th birthday PSA: 76 4 ng/mL     History Prostate Cancer  Screening Not Indicated     Hepatitis C Screening Once for adults born between 1945 and 1965  More frequently in patients at high risk for Hepatitis C Hep C Antibody: Not on file        Diabetes Screening 1-2 times per year if you're at risk for diabetes or have pre-diabetes Fasting glucose: 84 mg/dL (10/10/2022)  A1C: No results in last 5 years (No results in last 5 years)  Screening Current   Cholesterol Screening Once every 5 years if you don't have a lipid disorder  May order more often based on risk factors  Lipid panel: 09/30/2021  Screening Current      Other Preventive Screenings Covered by Medicare:  1  Abdominal Aortic Aneurysm (AAA) Screening: covered once if your at risk  You're considered to be at risk if you have a family history of AAA or a male between the age of 73-68 who smoking at least 100 cigarettes in your lifetime  2  Lung Cancer Screening: covers low dose CT scan once per year if you meet all of the following conditions: (1) Age 50-69; (2) No signs or symptoms of lung cancer; (3) Current smoker or have quit smoking within the last 15 years; (4) You have a tobacco smoking history of at least 20 pack years (packs per day x number of years you smoked); (5) You get a written order from a healthcare provider  3  Glaucoma Screening: covered annually if you're considered high risk: (1) You have diabetes OR (2) Family history of glaucoma OR (3)  aged 48 and older OR (3)  American aged 72 and older  3  Osteoporosis Screening: covered every 2 years if you meet one of the following conditions: (1) Have a vertebral abnormality; (2) On glucocorticoid therapy for more than 3 months; (3) Have primary hyperparathyroidism; (4) On osteoporosis medications and need to assess response to drug therapy  5  HIV Screening: covered annually if you're between the age of 12-76  Also covered annually if you are younger than 13 and older than 72 with risk factors for HIV infection  For pregnant patients, it is covered up to 3 times per pregnancy      Immunizations:  Immunization Recommendations   Influenza Vaccine Annual influenza vaccination during flu season is recommended for all persons aged >= 6 months who do not have contraindications   Pneumococcal Vaccine   * Pneumococcal conjugate vaccine = PCV13 (Prevnar 13), PCV15 (Vaxneuvance), PCV20 (Prevnar 20)  * Pneumococcal polysaccharide vaccine = PPSV23 (Pneumovax) Adults 2364 years old: 1-3 doses may be recommended based on certain risk factors  Adults 72 years old: 1-2 doses may be recommended based off what pneumonia vaccine you previously received   Hepatitis B Vaccine 3 dose series if at intermediate or high risk (ex: diabetes, end stage renal disease, liver disease)   Tetanus (Td) Vaccine - COST NOT COVERED BY MEDICARE PART B Following completion of primary series, a booster dose should be given every 10 years to maintain immunity against tetanus  Td may also be given as tetanus wound prophylaxis  Tdap Vaccine - COST NOT COVERED BY MEDICARE PART B Recommended at least once for all adults  For pregnant patients, recommended with each pregnancy  Shingles Vaccine (Shingrix) - COST NOT COVERED BY MEDICARE PART B  2 shot series recommended in those aged 48 and above     Health Maintenance Due:      Topic Date Due   • Hepatitis C Screening  Never done     Immunizations Due:      Topic Date Due   • Hepatitis B Vaccine (1 of 3 - 3-dose series) Never done   • Pneumococcal Vaccine: 65+ Years (2 - PPSV23 if available, else PCV20) 10/09/2016   • COVID-19 Vaccine (4 - Booster for Moderna series) 12/01/2021   • Influenza Vaccine (1) 09/01/2022     Advance Directives   What are advance directives? Advance directives are legal documents that state your wishes and plans for medical care  These plans are made ahead of time in case you lose your ability to make decisions for yourself  Advance directives can apply to any medical decision, such as the treatments you want, and if you want to donate organs  What are the types of advance directives? There are many types of advance directives, and each state has rules about how to use them  You may choose a combination of any of the following:  · Living will: This is a written record of the treatment you want   You can also choose which treatments you do not want, which to limit, and which to stop at a certain time  This includes surgery, medicine, IV fluid, and tube feedings  · Durable power of  for healthcare Rew [facility]): This is a written record that states who you want to make healthcare choices for you when you are unable to make them for yourself  This person, called a proxy, is usually a family member or a friend  You may choose more than 1 proxy  · Do not resuscitate (DNR) order:  A DNR order is used in case your heart stops beating or you stop breathing  It is a request not to have certain forms of treatment, such as CPR  A DNR order may be included in other types of advance directives  · Medical directive: This covers the care that you want if you are in a coma, near death, or unable to make decisions for yourself  You can list the treatments you want for each condition  Treatment may include pain medicine, surgery, blood transfusions, dialysis, IV or tube feedings, and a ventilator (breathing machine)  · Values history: This document has questions about your views, beliefs, and how you feel and think about life  This information can help others choose the care that you would choose  Why are advance directives important? An advance directive helps you control your care  Although spoken wishes may be used, it is better to have your wishes written down  Spoken wishes can be misunderstood, or not followed  Treatments may be given even if you do not want them  An advance directive may make it easier for your family to make difficult choices about your care  Fall Prevention    Fall prevention  includes ways to make your home and other areas safer  It also includes ways you can move more carefully to prevent a fall  Health conditions that cause changes in your blood pressure, vision, or muscle strength and coordination may increase your risk for falls  Medicines may also increase your risk for falls if they make you dizzy, weak, or sleepy     Fall prevention tips: · Stand or sit up slowly  · Use assistive devices as directed  · Wear shoes that fit well and have soles that   · Wear a personal alarm  · Stay active  · Manage your medical conditions  Home Safety Tips:  · Add items to prevent falls in the bathroom  · Keep paths clear  · Install bright lights in your home  · Keep items you use often on shelves within reach  · Paint or place reflective tape on the edges of your stairs  Weight Management   Why it is important to manage your weight:  Being overweight increases your risk of health conditions such as heart disease, high blood pressure, type 2 diabetes, and certain types of cancer  It can also increase your risk for osteoarthritis, sleep apnea, and other respiratory problems  Aim for a slow, steady weight loss  Even a small amount of weight loss can lower your risk of health problems  How to lose weight safely:  A safe and healthy way to lose weight is to eat fewer calories and get regular exercise  You can lose up about 1 pound a week by decreasing the number of calories you eat by 500 calories each day  Healthy meal plan for weight management:  A healthy meal plan includes a variety of foods, contains fewer calories, and helps you stay healthy  A healthy meal plan includes the following:  · Eat whole-grain foods more often  A healthy meal plan should contain fiber  Fiber is the part of grains, fruits, and vegetables that is not broken down by your body  Whole-grain foods are healthy and provide extra fiber in your diet  Some examples of whole-grain foods are whole-wheat breads and pastas, oatmeal, brown rice, and bulgur  · Eat a variety of vegetables every day  Include dark, leafy greens such as spinach, kale, ciaran greens, and mustard greens  Eat yellow and orange vegetables such as carrots, sweet potatoes, and winter squash  · Eat a variety of fruits every day    Choose fresh or canned fruit (canned in its own juice or light syrup) instead of juice  Fruit juice has very little or no fiber  · Eat low-fat dairy foods  Drink fat-free (skim) milk or 1% milk  Eat fat-free yogurt and low-fat cottage cheese  Try low-fat cheeses such as mozzarella and other reduced-fat cheeses  · Choose meat and other protein foods that are low in fat  Choose beans or other legumes such as split peas or lentils  Choose fish, skinless poultry (chicken or turkey), or lean cuts of red meat (beef or pork)  Before you cook meat or poultry, cut off any visible fat  · Use less fat and oil  Try baking foods instead of frying them  Add less fat, such as margarine, sour cream, regular salad dressing and mayonnaise to foods  Eat fewer high-fat foods  Some examples of high-fat foods include french fries, doughnuts, ice cream, and cakes  · Eat fewer sweets  Limit foods and drinks that are high in sugar  This includes candy, cookies, regular soda, and sweetened drinks  Exercise:  Exercise at least 30 minutes per day on most days of the week  Some examples of exercise include walking, biking, dancing, and swimming  You can also fit in more physical activity by taking the stairs instead of the elevator or parking farther away from stores  Ask your healthcare provider about the best exercise plan for you  Narcotic (Opioid) Safety    Use narcotics safely:  · Take prescribed narcotics exactly as directed  · Do not give narcotics to others or take narcotics that belong to someone else  · Do not mix narcotics without medicines or alcohol  · Do not drive or operate heavy machinery after you take the narcotic  · Monitor for side effects and notify your healthcare provider if you experienced side effects such as nausea, sleepiness, itching, or trouble thinking clearly  Manage constipation:    Constipation is the most common side effect of narcotic medicine   Constipation is when you have hard, dry bowel movements, or you go longer than usual between bowel movements  Tell your healthcare provider about all changes in your bowel movements while you are taking narcotics  He or she may recommend laxative medicine to help you have a bowel movement  He or she may also change the kind of narcotic you are taking, or change when you take it  The following are more ways you can prevent or relieve constipation:    · Drink liquids as directed  You may need to drink extra liquids to help soften and move your bowels  Ask how much liquid to drink each day and which liquids are best for you  · Eat high-fiber foods  This may help decrease constipation by adding bulk to your bowel movements  High-fiber foods include fruits, vegetables, whole-grain breads and cereals, and beans  Your healthcare provider or dietitian can help you create a high-fiber meal plan  Your provider may also recommend a fiber supplement if you cannot get enough fiber from food  · Exercise regularly  Regular physical activity can help stimulate your intestines  Walking is a good exercise to prevent or relieve constipation  Ask which exercises are best for you  · Schedule a time each day to have a bowel movement  This may help train your body to have regular bowel movements  Bend forward while you are on the toilet to help move the bowel movement out  Sit on the toilet for at least 10 minutes, even if you do not have a bowel movement  Store narcotics safely:   · Store narcotics where others cannot easily get them  Keep them in a locked cabinet or secure area  Do not  keep them in a purse or other bag you carry with you  A person may be looking for something else and find the narcotics  · Make sure narcotics are stored out of the reach of children  A child can easily overdose on narcotics  Narcotics may look like candy to a small child  The best way to dispose of narcotics: The laws vary by country and area   In the [place], the best way is to return the narcotics through a take-back program  This program is offered by the [employer])  The following are options for using the program:  · Take the narcotics to a MARIA collection site  The site is often a law enforcement center  Call your local law enforcement center for scheduled take-back days in your area  You will be given information on where to go if the collection site is in a different location  · Take the narcotics to an approved pharmacy or hospital   A pharmacy or hospital may be set up as a collection site  You will need to ask if it is a MARIA collection site if you were not directed there  A pharmacy or doctor's office may not be able to take back narcotics unless it is a MARIA site  · Use a mail-back system  This means you are given containers to put the narcotics into  You will then mail them in the containers  · Use a take-back drop box  This is a place to leave the narcotics at any time  People and animals will not be able to get into the box  Your local law enforcement agency can tell you where to find a drop box in your area  Other ways to manage pain:   · Ask your healthcare provider about non-narcotic medicines to control pain  Nonprescription medicines include NSAIDs (such as ibuprofen) and acetaminophen  Prescription medicines include muscle relaxers, antidepressants, and steroids  · Pain may be managed without any medicines  Some ways to relieve pain include massage, aromatherapy, or meditation  Physical or occupational therapy may also help  For more information:   · Drug Enforcement Administration  [address] [place] [address]  [address] [place] [address]  Phone: 4- [phone] - [phone]  Web Address: [facility]/drug_disposal/    · [address]  and Drug Administration  [facility] , [address] [place] [address]  Phone: 0- [phone] - [phone]  Web Address: http://SafetyWeb/     © Copyright [employer] 2018 Information is for End User's use only and may not be sold, redistributed or otherwise used for commercial purposes   All illustrations and images included in CareNotes® are the copyrighted property of A D A M , Inc  or [facility] Rosario Triana

## 2022-11-29 NOTE — ASSESSMENT & PLAN NOTE
Patient has off and on diarrhea due to polypharmacy, he denies any dark or bloody stools  Imodium helps control the frequency  Patient advised to hydrate with electrolyte solution to maintain electrolyte balance when he is experiencing diarrhea and follow up if diarrhea becomes more persistent

## 2022-11-30 ENCOUNTER — TELEPHONE (OUTPATIENT)
Dept: CARDIOLOGY CLINIC | Facility: CLINIC | Age: 77
End: 2022-11-30

## 2022-11-30 ENCOUNTER — OFFICE VISIT (OUTPATIENT)
Dept: CARDIOLOGY CLINIC | Facility: CLINIC | Age: 77
End: 2022-11-30

## 2022-11-30 VITALS
HEART RATE: 80 BPM | DIASTOLIC BLOOD PRESSURE: 50 MMHG | WEIGHT: 208.4 LBS | SYSTOLIC BLOOD PRESSURE: 102 MMHG | BODY MASS INDEX: 32.71 KG/M2 | OXYGEN SATURATION: 95 % | HEIGHT: 67 IN

## 2022-11-30 DIAGNOSIS — I25.10 CORONARY ARTERY DISEASE INVOLVING NATIVE CORONARY ARTERY OF NATIVE HEART WITHOUT ANGINA PECTORIS: ICD-10-CM

## 2022-11-30 DIAGNOSIS — I50.22 CHRONIC HFREF (HEART FAILURE WITH REDUCED EJECTION FRACTION) (HCC): Primary | ICD-10-CM

## 2022-11-30 DIAGNOSIS — Z95.2 S/P TAVR (TRANSCATHETER AORTIC VALVE REPLACEMENT): ICD-10-CM

## 2022-11-30 DIAGNOSIS — Z95.810 PRESENCE OF AUTOMATIC CARDIOVERTER/DEFIBRILLATOR (AICD): ICD-10-CM

## 2022-11-30 NOTE — PATIENT INSTRUCTIONS
We will price check farxiga  Continue current cardiac medications  We will obtain echocardiogram  Limit ibuprofen use, follow up with PCP regarding pain management  2g sodium diet  2L fluid restriction  Daily weights

## 2022-11-30 NOTE — TELEPHONE ENCOUNTER
jardiance 10 mg no prior authorization required     Current cost for a 30 day supply is $0    This will only last until the new year at which point the patient will have to meet their new deductible which will be around 445 dollars

## 2022-12-02 DIAGNOSIS — R11.0 NAUSEA: Primary | ICD-10-CM

## 2022-12-02 DIAGNOSIS — C61 PROSTATE CANCER (HCC): Primary | ICD-10-CM

## 2022-12-02 RX ORDER — ONDANSETRON 4 MG/1
4 TABLET, FILM COATED ORAL EVERY 8 HOURS PRN
Qty: 30 TABLET | Refills: 3 | Status: SHIPPED | OUTPATIENT
Start: 2022-12-02

## 2022-12-08 DIAGNOSIS — I50.42 CHRONIC COMBINED SYSTOLIC AND DIASTOLIC CHF (CONGESTIVE HEART FAILURE) (HCC): Primary | ICD-10-CM

## 2022-12-08 DIAGNOSIS — I50.22 CHRONIC HFREF (HEART FAILURE WITH REDUCED EJECTION FRACTION) (HCC): Primary | ICD-10-CM

## 2022-12-08 RX ORDER — IVABRADINE 5 MG/1
5 TABLET, FILM COATED ORAL 2 TIMES DAILY
Qty: 60 TABLET | Refills: 11 | Status: SHIPPED | OUTPATIENT
Start: 2022-12-08 | End: 2022-12-08 | Stop reason: CLARIF

## 2022-12-08 NOTE — TELEPHONE ENCOUNTER
Spoke to patient regarding price, he states he will not be able to afford the medication in the new year, and does not wan to start

## 2022-12-08 NOTE — TELEPHONE ENCOUNTER
Patient called back he  would like us to send a script to the [place], he thinks they may pay for the med  Ok to fax script for Corlanor 5 mg BID  to [place]?

## 2022-12-09 ENCOUNTER — TELEPHONE (OUTPATIENT)
Dept: CARDIOLOGY CLINIC | Facility: CLINIC | Age: 77
End: 2022-12-09

## 2022-12-09 NOTE — TELEPHONE ENCOUNTER
[facility] [employer] [facility] [place] [facility]      Phone [phone]  Fax [phone]

## 2022-12-15 ENCOUNTER — HOSPITAL ENCOUNTER (OUTPATIENT)
Dept: NON INVASIVE DIAGNOSTICS | Facility: CLINIC | Age: 77
Discharge: HOME | End: 2022-12-15

## 2022-12-15 VITALS
SYSTOLIC BLOOD PRESSURE: 102 MMHG | DIASTOLIC BLOOD PRESSURE: 50 MMHG | HEART RATE: 80 BPM | HEIGHT: 67 IN | BODY MASS INDEX: 32.65 KG/M2 | WEIGHT: 208 LBS

## 2022-12-15 DIAGNOSIS — I50.22 CHRONIC HFREF (HEART FAILURE WITH REDUCED EJECTION FRACTION) (HCC): ICD-10-CM

## 2022-12-15 DIAGNOSIS — Z95.2 S/P TAVR (TRANSCATHETER AORTIC VALVE REPLACEMENT): ICD-10-CM

## 2022-12-15 LAB
AORTIC ROOT: 2.7 CM
AORTIC VALVE MEAN VELOCITY: 12.8 M/S
APICAL FOUR CHAMBER EJECTION FRACTION: 31 %
ASCENDING AORTA: 3.9 CM
AV LVOT MEAN GRADIENT: 3 MMHG
AV LVOT PEAK GRADIENT: 6 MMHG
AV MEAN GRADIENT: 7 MMHG
AV PEAK GRADIENT: 17 MMHG
AV VELOCITY RATIO: 0.62
DOP CALC AO PEAK VEL: 1.99 M/S
DOP CALC AO VTI: 32.37 CM
DOP CALC LVOT PEAK VEL VTI: 24.22 CM
DOP CALC LVOT PEAK VEL: 1.24 M/S
E WAVE DECELERATION TIME: 211 MS
FRACTIONAL SHORTENING: 13 % (ref 28–44)
INTERVENTRICULAR SEPTUM IN DIASTOLE (PARASTERNAL SHORT AXIS VIEW): 1.4 CM
INTERVENTRICULAR SEPTUM: 1.4 CM (ref 0.6–1.1)
LAAS-AP2: 21.4 CM2
LAAS-AP4: 18.6 CM2
LEFT ATRIUM SIZE: 5.4 CM
LEFT INTERNAL DIMENSION IN SYSTOLE: 3.9 CM (ref 2.1–4)
LEFT VENTRICLE DIASTOLIC VOLUME (MOD BIPLANE): 146 ML
LEFT VENTRICLE SYSTOLIC VOLUME (MOD BIPLANE): 93 ML
LEFT VENTRICULAR INTERNAL DIMENSION IN DIASTOLE: 4.5 CM (ref 3.5–6)
LEFT VENTRICULAR POSTERIOR WALL IN END DIASTOLE: 1.5 CM
LEFT VENTRICULAR STROKE VOLUME: 29 ML
LV EF: 36 %
LVSV (TEICH): 29 ML
MV E'TISSUE VEL-SEP: 5 CM/S
MV PEAK A VEL: 1.17 M/S
MV PEAK E VEL: 86 CM/S
MV STENOSIS PRESSURE HALF TIME: 61 MS
MV VALVE AREA P 1/2 METHOD: 3.61 CM2
RA PRESSURE ESTIMATED: 4 MMHG
RIGHT VENTRICLE ID DIMENSION: 3.7 CM
RV PSP: 31 MMHG
SL CV LEFT ATRIUM LENGTH A2C: 4.9 CM
SL CV LV EF: 30
SL CV PED ECHO LEFT VENTRICLE DIASTOLIC VOLUME (MOD BIPLANE) 2D: 93 ML
SL CV PED ECHO LEFT VENTRICLE SYSTOLIC VOLUME (MOD BIPLANE) 2D: 64 ML
TR MAX PG: 27 MMHG
TR PEAK VELOCITY: 2.6 M/S
TRICUSPID VALVE PEAK REGURGITATION VELOCITY: 2.61 M/S

## 2022-12-15 RX ADMIN — PERFLUTREN 0.8 ML/MIN: 6.52 INJECTION, SUSPENSION INTRAVENOUS at 16:10

## 2022-12-16 DIAGNOSIS — I73.9 PAD (PERIPHERAL ARTERY DISEASE) (HCC): ICD-10-CM

## 2022-12-16 RX ORDER — GABAPENTIN 300 MG/1
600 CAPSULE ORAL 2 TIMES DAILY
Qty: 120 CAPSULE | Refills: 0 | Status: SHIPPED | OUTPATIENT
Start: 2022-12-16

## 2022-12-19 ENCOUNTER — TELEPHONE (OUTPATIENT)
Dept: HEMATOLOGY ONCOLOGY | Facility: HOSPITAL | Age: 77
End: 2022-12-19

## 2022-12-19 NOTE — TELEPHONE ENCOUNTER
12/19/22    Spoke with patient reminding updated labs prior to appt  Will check CBC/ CMP/ PSA    Advising pt to go to any [facility] walk-in labs to get blood work done  Pt stated he will get labs done tomorrow

## 2022-12-20 ENCOUNTER — APPOINTMENT (OUTPATIENT)
Dept: LAB | Facility: CLINIC | Age: 77
End: 2022-12-20

## 2022-12-20 ENCOUNTER — OFFICE VISIT (OUTPATIENT)
Dept: PALLIATIVE MEDICINE | Facility: CLINIC | Age: 77
End: 2022-12-20

## 2022-12-20 VITALS
SYSTOLIC BLOOD PRESSURE: 104 MMHG | TEMPERATURE: 96.4 F | BODY MASS INDEX: 31.49 KG/M2 | WEIGHT: 201.06 LBS | DIASTOLIC BLOOD PRESSURE: 60 MMHG | HEART RATE: 89 BPM | OXYGEN SATURATION: 98 %

## 2022-12-20 DIAGNOSIS — G89.3 CANCER RELATED PAIN: ICD-10-CM

## 2022-12-20 DIAGNOSIS — C79.51 MALIGNANT NEOPLASM METASTATIC TO BONE (HCC): ICD-10-CM

## 2022-12-20 DIAGNOSIS — C61 PROSTATE CANCER (HCC): ICD-10-CM

## 2022-12-20 DIAGNOSIS — Z51.5 PALLIATIVE CARE PATIENT: ICD-10-CM

## 2022-12-20 LAB
ALBUMIN SERPL BCP-MCNC: 4.1 G/DL (ref 3.5–5)
ALP SERPL-CCNC: 140 U/L (ref 34–104)
ALT SERPL W P-5'-P-CCNC: 9 U/L (ref 7–52)
ANION GAP SERPL CALCULATED.3IONS-SCNC: 12 MMOL/L (ref 4–13)
AST SERPL W P-5'-P-CCNC: 12 U/L (ref 13–39)
BASOPHILS # BLD AUTO: 0.05 THOUSANDS/ÂΜL (ref 0–0.1)
BASOPHILS NFR BLD AUTO: 1 % (ref 0–1)
BILIRUB SERPL-MCNC: 0.35 MG/DL (ref 0.2–1)
BUN SERPL-MCNC: 23 MG/DL (ref 5–25)
CALCIUM SERPL-MCNC: 8.8 MG/DL (ref 8.4–10.2)
CHLORIDE SERPL-SCNC: 107 MMOL/L (ref 96–108)
CO2 SERPL-SCNC: 21 MMOL/L (ref 21–32)
CREAT SERPL-MCNC: 0.88 MG/DL (ref 0.6–1.3)
EOSINOPHIL # BLD AUTO: 0.24 THOUSAND/ÂΜL (ref 0–0.61)
EOSINOPHIL NFR BLD AUTO: 3 % (ref 0–6)
ERYTHROCYTE [DISTWIDTH] IN BLOOD BY AUTOMATED COUNT: 13.9 % (ref 11.6–15.1)
GFR SERPL CREATININE-BSD FRML MDRD: 82 ML/MIN/1.73SQ M
GLUCOSE SERPL-MCNC: 94 MG/DL (ref 65–140)
HCT VFR BLD AUTO: 33.5 % (ref 36.5–49.3)
HGB BLD-MCNC: 10.5 G/DL (ref 12–17)
IMM GRANULOCYTES # BLD AUTO: 0.11 THOUSAND/UL (ref 0–0.2)
IMM GRANULOCYTES NFR BLD AUTO: 1 % (ref 0–2)
LYMPHOCYTES # BLD AUTO: 1.42 THOUSANDS/ÂΜL (ref 0.6–4.47)
LYMPHOCYTES NFR BLD AUTO: 16 % (ref 14–44)
MCH RBC QN AUTO: 28.4 PG (ref 26.8–34.3)
MCHC RBC AUTO-ENTMCNC: 31.3 G/DL (ref 31.4–37.4)
MCV RBC AUTO: 91 FL (ref 82–98)
MONOCYTES # BLD AUTO: 0.55 THOUSAND/ÂΜL (ref 0.17–1.22)
MONOCYTES NFR BLD AUTO: 6 % (ref 4–12)
NEUTROPHILS # BLD AUTO: 6.42 THOUSANDS/ÂΜL (ref 1.85–7.62)
NEUTS SEG NFR BLD AUTO: 73 % (ref 43–75)
NRBC BLD AUTO-RTO: 0 /100 WBCS
PLATELET # BLD AUTO: 404 THOUSANDS/UL (ref 149–390)
PMV BLD AUTO: 9.3 FL (ref 8.9–12.7)
POTASSIUM SERPL-SCNC: 4.3 MMOL/L (ref 3.5–5.3)
PROT SERPL-MCNC: 7.5 G/DL (ref 6.4–8.4)
RBC # BLD AUTO: 3.7 MILLION/UL (ref 3.88–5.62)
SODIUM SERPL-SCNC: 140 MMOL/L (ref 135–147)
WBC # BLD AUTO: 8.79 THOUSAND/UL (ref 4.31–10.16)

## 2022-12-20 RX ORDER — OXYCODONE HYDROCHLORIDE 10 MG/1
10 TABLET ORAL 4 TIMES DAILY PRN
Qty: 120 TABLET | Refills: 0 | Status: SHIPPED | OUTPATIENT
Start: 2022-12-20

## 2022-12-20 NOTE — PROGRESS NOTES
Outpatient Follow-Up - Palliative and Supportive Care   Kayla Rene 68 y o  male [id number]    Assessment & Plan  Problem List Items Addressed This Visit    None      Medications adjusted this encounter:  Requested Prescriptions      No prescriptions requested or ordered in this encounter     No orders of the defined types were placed in this encounter  There are no discontinued medications  - Ongoing close f/up in Palliative clinic  Next visit 4 wks  - Pt continues to be an ongoing risk for medication misuse:   = blind in one eye   = Limited medical savvy   = Fairly hard of hearing   = Little oversight or support from his family in medication checks  - No changes to meds today  Will send 1 mo of Rx  - Oncology SW :  Ms Cyrus Parekh following for:   = Financial difficulties - pt STILL using dead wife's broken cane as ambulatory aid   = Transport - pt has visual challenges and relies very much on son for transport  Son also needs to work  = Limited health literacy - does not know names of meds; has trouble reading bottles d/t visual impairment    = Hard of hearing - would benefit from hearing aid      Monty Crum was seen today for symptoms and planning cares related to above illnesses  I have reviewed the patient's controlled substance dispensing history in the Prescription Drug Monitoring Program in compliance with the [facility] regulations before prescribing any controlled substances  They are invited to continue to follow with us  If there are questions or concerns, please contact us through our clinic/answering service 24 hours a day, seven days a week      [facility] Palliative and Supportive Care        Visit Information    Accompanied By: Family member    Source of History: Self, Family member    History Limitations: limited health literacy    Contacts: son Piper Granda - [phone]               Daughter Disha Gianna - [phone]    History of Present Illness Siobhan Bower is a 68 y o  male who presents in follow up of symptoms related to Stage IV prostate cancer, hormone treatment resistant  He follow with Dr Bernadette Benedict and Ms Jossy Frye for this  There is also a significant cardiovascular history, with heart failure management by Dr Leanna Stanley, and PAD with distal pain at rest in both feet  Pertinent issues include: symptom management, resource management  Since last visit, pt has mohan told that his PSA is climbing markedly, but not what plan has been advised for this  He knows to see Dr Bernadette Benedict tomorrow  Pt arrives without his son to clinic today, and -- as per his usual -- he does not the names, amounts, indications, nor uses of his meds  He does request refills, but has trouble reading his own notes  pt's son Lidia Huggins is still living in home, but cannot provide enough cares to pt  Lidia Huggins is curerntly working full time to support the household  Family would like to obtain waiver services to denise income to son for support he is already providing  Pt notes memory challenges complicating his medication usage, and with ADLs  He has trouble standing to dress, shower, shave, brush teeth  Chores about the house are nearly impossible because he cannot bend over, and he can't stand for more than about 3min at a time  This complicates his ability to cook for self as well  He does not wish to leave home, but would appreciate aids in the home to help clean  Has a Roomba to get some of the surfaces / floors in home  Erum Hope has a fixed income, and has little disposible income after living expenses  We have received a Nursing Home Eligibility form, which I will fill out today, having now better understood what the pt's current needs and situation are  Re: cancer cares, his PSA conitnues to hover in high 20s low 30s    He is due to f/up with Dr Bernadette Benedict in December now; schedule for Dr Bernadette Benedict was adjusted for pt's previously scheduled visit in Nov       Today in office, the pt cannot say where his medicines come from, nor exactly how they arrive at his home  He knows that his son sometimes picks up certain meds direct from pharmacy  Pt states he has an infusion appointment today, but he doesn't know why, and hypothesizes this is in response to an episode of chest pain he had two or three days ago  In a separate phone call to pt's son, we confirm that:   - Pt is not leaving home, and pt has significant ambulatory challenges about the home    - Pt's meds are set up by the son, and picked up in person when he has the chance  - Some meds might be delivered by [facility], but son has not coordinated this  - Son is not aware of any chest pain reported by the patient; he remembers that the pt's device was interrogated some months ago, in the Spring of 2022, and not since that time    - Son is driving pt to all appts, and still seeking waiver services  [facility] has some rates at $19+/hr, which would be preferable income over [facility] quoted rates of $12/hr  Diarreha controlled with Lomotil, only occurs on weeks that he has chemo  He states his pain is controlled with 'that medication that starts with an O', though he cannot recall the name of this med specifically  That all being said, he is consistent about pill usage, and has not run out early  He is able to read the instructions on bottles  Per Dr Edis Comer, the pt is not a candidate for traditional cytotoxic therapy, but he may continue on supportive therapies and hormone blockade  That being said, Dr Gonzales Ochoa last sentence in his plan from 8/17: "He is not a candidate for chemotherapy at this point as I do not think he will tolerate this well  If needed we can readdress this if his PSA continues to go up "    Pt has not seen PCP in quite some time, and has no desire to return unless very strongly indicated        Getting Meals on Wheels, but having trouble both paying for additional meals, and standing long enough to do dishes and prep food  He inquires today about food stamps, as she has spent down over half his life savings in order to pay for cares and living expenses  From our first visit on 3/1:    "Pt has demonstrated -- over a year ago now -- progression of his illness (rising PSA and decreasing functional status) despite aggressive therapies and cytotoxic treatments  His side effects vs decompensation in his overall illness was so severe he was admitted to [address] [place] [address]  in march 2021  Since that time, he has recovered some function with careful management of his cardiac disease, and less aggressive, less toxic cancer cares  At this time, his treatment plan involves Xofigo for bone-avid disease, +/- Lupron  We note that the patient has demonstrated some disease progression on other hormonal blockers, but that this plan of care is being offered, if not rec'd, by Dr Kennedy Villanueva  Pt presents to our office with suboptimally controlled pain, ongoing wt loss, poor appetite, and general malaise  His concerns are more related to his back and R foot pain; a Podiatrist today advised him that his unilateral neuropathic symptoms 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        Today, he can't quite recall his medications, he just insists that he needs 'something stronger than tynlenol'  When we pointed out that he has used tramadol and oxyIR recently, he also stated that these medications were not helpful  Pt endorses support from his son, who will drive pt to appts and on other errands  Son does not help with meds  Pt can't name his meds, but has some vague understanding of the indications for his meds "    Past medical, surgical, social, and family histories are reviewed and pertinent updates are made  Review of Systems   Unable to perform ROS: dementia         Vital Signs    There were no vitals taken for this visit      Physical Exam and Objective Data  Physical Exam  Constitutional:       General: He is not in acute distress  Appearance: He is ill-appearing  He is not toxic-appearing or diaphoretic  Comments: Frail   HENT:      Head: Normocephalic and atraumatic  Right Ear: External ear normal       Left Ear: External ear normal    Eyes:      General:         Right eye: No discharge  Left eye: No discharge  Conjunctiva/sclera: Conjunctivae normal       Pupils: Pupils are equal, round, and reactive to light  Neck:      Trachea: No tracheal deviation  Cardiovascular:      Rate and Rhythm: Regular rhythm  Tachycardia present  Pulmonary:      Effort: Pulmonary effort is normal  No respiratory distress  Breath sounds: No stridor  Abdominal:      Palpations: Abdomen is soft  Comments: scaphoid   Skin:     General: Skin is warm and dry  Coloration: Skin is pale  Findings: No erythema or rash  Neurological:      General: No focal deficit present  Mental Status: He is alert  Mental status is at baseline  He is disoriented  Cranial Nerves: No cranial nerve deficit  Psychiatric:         Mood and Affect: Mood normal       Comments: Poor insight and limited judgment  Radiology and Laboratory:  I personally reviewed and interpreted the following results: none new    25+ minutes was spent face to face with Royce Rene  with greater than 50% of the time spent in counseling or coordination of care including: chart review; symptom pursuit; supportive listening; anticipatory guidance  All of the patient's or agent's questions were answered during this discussion

## 2022-12-21 ENCOUNTER — OFFICE VISIT (OUTPATIENT)
Dept: HEMATOLOGY ONCOLOGY | Facility: CLINIC | Age: 77
End: 2022-12-21

## 2022-12-21 VITALS
HEIGHT: 67 IN | RESPIRATION RATE: 16 BRPM | OXYGEN SATURATION: 97 % | HEART RATE: 90 BPM | SYSTOLIC BLOOD PRESSURE: 126 MMHG | BODY MASS INDEX: 30.92 KG/M2 | WEIGHT: 197 LBS | DIASTOLIC BLOOD PRESSURE: 80 MMHG | TEMPERATURE: 97.8 F

## 2022-12-21 DIAGNOSIS — C61 PROSTATE CANCER (HCC): ICD-10-CM

## 2022-12-21 DIAGNOSIS — C79.51 MALIGNANT NEOPLASM METASTATIC TO BONE (HCC): Primary | ICD-10-CM

## 2022-12-21 LAB — PSA SERPL-MCNC: 149.6 NG/ML (ref 0–4)

## 2022-12-21 NOTE — PROGRESS NOTES
Hematology/Oncology Outpatient Follow- up Note  Gume Rene 68 y o  male MRN: @ Encounter: [id number]        Date:  12/21/2022    Presenting Complaint/Diagnosis :    HPI:    Kimberly Lambert a 68year old male with multiple medical conditions including a significant cardiovascular history, heart failure, PAD  He has been following with [address] [place] [address] since 10/2021 for stage IV prostate cancer  He was previously treated at an outside institution  Per review of his EMR, he was initially diagnosed with a Francisco Javier 7 prostate cancer s/p treatment with external beam radiation from 10/2003 to 12/2003  He also got neoadjuvant and adjuvant LHRH for 9 months after radiation  Nas Foley was observed until July of 2008 when he was noted to have rising PSA and imaging done at that time showed new bony lesions  He was restarted on Lupron  He had evidence of disease progression on imaging completed in June of 2017  Enzalutamide was added to his regimen  His PSA continued to rise and he had progressive bony metastases including to the sacrum for which he received a course of palliative radiation, which was completed in February of 2019  He was then restarted on enzalutamide but a scan in December of 2019 showed worsening bone metastases so he was started on docetaxel in March of 2020   He was kept on this for approximately a year with his PSA slowly rising   After March, he was admitted to a hospital for cardiac issues and then was lost to follow up with his previous oncologist      He established care with me  and was restarted on Lupron in 10/2021  He was referred to nuclear Medicine and treated with Tanya Fleimng from 11/4/2021 to 4/2022    He was rechallenged with Davi Aleman in addition to [address] in May 2022    Bonny Enzo was added to his regimen in August 2022    Previous Hematologic/ Oncologic History:    As above    Current Hematologic/ Oncologic Treatment:    Lupron every 3 months,  Caryle Chaco,    Kerri History:    Patient turns for follow-up visit  He was last seen on November 10 and was referred to nuclear medicine  He has been seen by them and they are planning to start Pluvicto depending on drug availability  He was plugged in with social work as he was having issues with appointments and day-to-day activities  PSA continued to rise  Most recent is pending previously it had gone up  ECOG performance status is 2  He is set up for nuclear medicine  He was told the medication will be available in February  Is unable to take chemotherapy based on his performance status so he will stay on his current regimen until he can get Zytiga and prednisone delivered and then he will be treated by nuclear medicine whenever Pluvicto is available  In terms of symptoms he is forgetful but otherwise doing well  Walks with a cane  Denies any nausea or vomiting  Denies any diarrhea  Is seeing palliative care regularly  We did discuss hospice but he did not wish to consider the nuclear medicine treatment pluvecto so we will continue him on his current regimen until he can get it  The rest of his 14 point review of systems today was negative  I did advise him to call nuclear medicine to make sure he has the dates correct  I will also make sure they get a copy of this note  Test Results:    Imaging: Echo complete w/ contrast if indicated    Result Date: 12/15/2022  Narrative: •  Left Ventricle: Left ventricular cavity size is normal  Wall thickness is increased  There is moderate concentric hypertrophy  The left ventricular ejection fraction is 30-35%  Systolic function is severely reduced  There is mid anteroseptal, mid inferoseptal, apical septal, apical lateral, apical inferior, apical anterior and apical akinesis with wall thinning, suggestive of aneurysmal changes  Diastolic function is mildly abnormal, consistent with grade I (abnormal) relaxation  Left atrial filling pressure is elevated   There is no thrombus visualized in the LV apex after administration of Definity  •  Right Ventricle: Systolic function is normal  •  Aortic Valve: There is a TAVR bioprosthetic valve  The prosthetic valve appears well-seated  There is mild paravalvular regurgitation  The gradient recorded across the prosthetic aortic valve is within the expected range  The aortic valve peak gradient is 17 0 mmHg  The aortic valve mean gradient is 7 0 mmHg  •  Mitral Valve: There is mild annular calcification  There is mild regurgitation  •  Tricuspid Valve: The right ventricular systolic pressure is normal  The estimated right ventricular systolic pressure is 53 15 mmHg  •  Aorta: The aortic root is normal in size  The ascending aorta is mildly dilated  The ascending aorta is 3 9 cm  •  Pericardium: There is no pericardial effusion  Labs:   Lab Results   Component Value Date    WBC 8 79 12/20/2022    HGB 10 5 (L) 12/20/2022    HCT 33 5 (L) 12/20/2022    MCV 91 12/20/2022     (H) 12/20/2022     Lab Results   Component Value Date    K 4 3 12/20/2022     12/20/2022    CO2 21 12/20/2022    BUN 23 12/20/2022    CREATININE 0 88 12/20/2022    GLUF 84 10/10/2022    CALCIUM 8 8 12/20/2022    CORRECTEDCA 9 2 06/23/2021    AST 12 (L) 12/20/2022    ALT 9 12/20/2022    ALKPHOS 140 (H) 12/20/2022    EGFR 82 12/20/2022       Lab Results   Component Value Date    PSA 76 4 (H) 11/08/2022    PSA 30 2 (H) 08/15/2022    PSA 22 2 (H) 07/18/2022     ROS: As stated in the history of present illness otherwise his 14 point review of systems today was negative        Active Problems:   Patient Active Problem List   Diagnosis   • Chronic combined systolic and diastolic CHF (congestive heart failure) ([facility])   • S/P AVR   • Anemia   • Chronic midline low back pain   • Ischemic cardiomyopathy   • Ischemic leg pain   • Prostate cancer ([facility])   • CAD (coronary artery disease)   • Hypotension   • Lower extremity pain   • Urinary retention   • PAD (peripheral artery disease) ([facility])   • Port-A-Cath in place   • Malignant neoplasm metastatic to bone [facility])   • Embolism and 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 (HCC)   • Depression, recurrent (HCC)   • Weight loss   • Muscular deconditioning   • Cancer-related pain   • Palliative care patient   • Ambulatory dysfunction   • Peripheral neuropathy   • Financial problems   • Functional diarrhea       Past Medical History:   Past Medical History:   Diagnosis Date   • Cancer ([facility] ) 2002    tailbone   • Coronary artery disease     S/p stenting to circumflex and RCA   • HFrEF (heart failure with reduced ejection fraction) ([facility] ) 2021   • High cholesterol    • Prostate cancer [facility])        Surgical History:   Past Surgical History:   Procedure Laterality Date   • CARDIAC ELECTROPHYSIOLOGY PROCEDURE N/A 2021    Procedure: Implant ICD - bi ventricular;  Surgeon: Santhosh Fitzpatrick MD;  Location: [facility] CARDIAC CATH LAB; Service: Cardiology   • CARDIAC SURGERY     • ND THROMBOENDARTECTMY FEMORAL COMMON Right 2021    Procedure: ENDARTERECTOMY ARTERIAL FEMORAL;  Surgeon: Sigrid Schilder, DO;  Location: [facility];  Service: Vascular       Family History:  No family history on file  Cancer-related family history is not on file  Social History:   Social History     Socioeconomic History   • Marital status:       Spouse name: Not on file   • Number of children: 3   • Years of education: Not on file   • Highest education level: Not on file   Occupational History   • Not on file   Tobacco Use   • Smoking status: Former     Years: 20 00     Types: Cigarettes     Quit date: 2002     Years since quittin 4   • Smokeless tobacco: Never   Vaping Use   • Vaping Use: Never used   Substance and Sexual Activity   • Alcohol use: Not Currently   • Drug use: Not on file   • Sexual activity: Not on file   Other Topics Concern   • Not on file   Social History Narrative   • Not on file     Social Determinants of Health     Financial Resource Strain: Not on file   Food Insecurity: Not on file   Transportation Needs: Not on file   Physical Activity: Not on file   Stress: Not on file   Social Connections: Not on file   Intimate Partner Violence: Not on file   Housing Stability: Not on file       Current Medications:   Current Outpatient Medications   Medication Sig Dispense Refill   • acetaminophen (TYLENOL) 325 mg tablet Take 3 tablets (975 mg total) by mouth every 8 (eight) hours (Patient taking differently: Take 650 mg by mouth 3 (three) times a day) 90 tablet 0   • aspirin 81 mg chewable tablet Chew 1 tablet (81 mg total) daily 30 tablet 0   • atorvastatin (LIPITOR) 80 mg tablet Take 1 tablet (80 mg total) by mouth daily with dinner 90 tablet 1   • cetirizine (ZyrTEC) 5 MG tablet TAKE 1 TABLET (5 MG TOTAL) BY MOUTH DAILY  90 tablet 1   • clopidogrel (PLAVIX) 75 mg tablet Take 1 tablet (75 mg total) by mouth daily 90 tablet 0   • Empagliflozin (JARDIANCE) 10 MG TABS tablet Take 1 tablet (10 mg total) by mouth every morning 30 tablet 11   • enzalutamide (Xtandi) 40 mg capsule Take 4 capsules (160 mg total) by mouth in the morning  120 capsule 10   • famotidine (PEPCID) 20 mg tablet TAKE 1 TABLET BY MOUTH EVERY DAY 90 tablet 1   • ferrous sulfate 325 (65 Fe) mg tablet TAKE 1 TABLET BY MOUTH TWICE A DAY WITH MEALS 180 tablet 1   • furosemide (LASIX) 20 mg tablet TAKE 1 TABLET BY MOUTH EVERY DAY 90 tablet 1   • gabapentin (NEURONTIN) 300 mg capsule TAKE 2 CAPSULES (600 MG TOTAL) BY MOUTH 2 (TWO) TIMES A DAY TO REDUCE NERVE PAIN IN FEET   120 capsule 0   • ibuprofen (MOTRIN) 200 mg tablet Take 200 mg by mouth every 4 (four) hours as needed for mild pain     • leuprolide (Eligard) 22 5 mg      • lidocaine-prilocaine (EMLA) cream Apply topically as needed (nerve pain) In Right leg 30 g 1   • loperamide (IMODIUM) 2 mg capsule Take 1 capsule (2 mg total) by mouth 4 (four) times a day as needed for diarrhea 30 capsule 2   • losartan (COZAAR) 25 mg tablet TAKE 1/2 TABLET BY MOUTH EVERY DAY 45 tablet 1   • methocarbamol (ROBAXIN) 750 mg tablet Take 1 tablet (750 mg total) by mouth 3 (three) times a day with meals To reduce back pain and muscle pain 90 tablet 0   • metoprolol succinate (TOPROL-XL) 25 mg 24 hr tablet TAKE 1 TABLET BY MOUTH EVERY DAY (Patient taking differently: 1/2 tablet daily) 90 tablet 2   • ondansetron (ZOFRAN) 4 mg tablet Take 1 tablet (4 mg total) by mouth every 8 (eight) hours as needed for nausea or vomiting 30 tablet 3   • oxyCODONE (ROXICODONE) 10 MG TABS Take 1 tablet (10 mg total) by mouth 4 (four) times a day as needed (cancer pain) Max Daily Amount: 40 mg 120 tablet 0   • prochlorperazine (COMPAZINE) 10 mg tablet Take 1 tablet (10 mg total) by mouth every 6 (six) hours as needed for nausea or vomiting 45 tablet 3     No current facility-administered medications for this visit  Allergies: No Known Allergies    Physical Exam:    Body surface area is 2 01 meters squared  Wt Readings from Last 3 Encounters:   12/21/22 89 4 kg (197 lb)   12/20/22 91 2 kg (201 lb 1 oz)   12/15/22 94 3 kg (208 lb)        Temp Readings from Last 3 Encounters:   12/21/22 97 8 °F (36 6 °C) (Temporal)   12/20/22 (!) 96 4 °F (35 8 °C) (Temporal)   11/29/22 98 4 °F (36 9 °C) (Tympanic)        BP Readings from Last 3 Encounters:   12/21/22 126/80   12/20/22 104/60   12/15/22 102/50         Pulse Readings from Last 3 Encounters:   12/21/22 90   12/20/22 89   12/15/22 80         Physical Exam     Constitutional   General appearance: No acute distress, Eyes   Conjunctiva and lids: No swelling, erythema or discharge  Pupils and irises: Equal, round and reactive to light  Ears, Nose, Mouth, and Throat   External inspection of ears and nose: Normal     Nasal mucosa, septum, and turbinates: Normal without edema or erythema  Oropharynx: Normal with no erythema, edema, exudate or lesions      Pulmonary   Respiratory effort: No increased work of breathing or signs of respiratory distress  Auscultation of lungs: Clear to auscultation  Cardiovascular   Palpation of heart: Normal PMI, no thrills  Auscultation of heart: Normal rate and rhythm, normal S1 and S2, without murmurs  Examination of extremities for edema and/or varicosities: Normal     Carotid pulses: Normal     Abdomen   Abdomen: Non-tender, no masses  Liver and spleen: No hepatomegaly or splenomegaly  Lymphatic   Palpation of lymph nodes in neck: No lymphadenopathy  Musculoskeletal   Gait and station: Walks with a cane  Digits and nails: Normal without clubbing or cyanosis  Inspection/palpation of joints, bones, and muscles: Normal     Skin   Skin and subcutaneous tissue: Normal without rashes or lesions  Assessment / Plan:      The patient is a 63-year-old gentleman with widely metastatic prostate cancer who has progressed on multiple lines of therapy including chemotherapy with docetaxel  He is presently on treatment with Lupron, Xgeva and Bulmaro Locker and is noted to have rising PSA  His PSA has more than doubled over the last few months  He does have PSMA positive disease as noted on most recent PET-CT scan  PSMA PET CT scan on 7/25/22 showed small focus of radiotracer uptake at the prostate suspicious for residual disease more conspicuous from the prior Axumin exam with innumerable osseous metastatic lesions with progression from prior Axumin PET-CT scan  Thomas Flint was a small intracranial focus of uptake at midline which was nonspecific  I did to refer the patient for nuclear Medicine for consideration of Pluvitico since he has PSMA positive prostate cancer with rising PSA  He was seen by nuclear medicine and has been set up for the medication  Plan is to start as soon as drug is available  This is with nuclear medicine  He will stay on his Lupron and Bulmaro Locker for now  He will get Xgeva every 3 months also  Hopefully he will have a response  Most recent PSA was 76 4    We will continue to follow  We will see if we can get Zytiga paid for for him in some way and try to put him on that instead of the Ana Maria Steven since he has had progression  I went over the medication with him  He is agreeable to proceeding  I explained the possible risk of his alternatives along with side effects to include diarrhea, allergic reaction, abnormal liver function test and death  He will sign a consent form  We will try to obtain it for him and if possible to ship it to him  He also take prednisone 5 mg daily  I will see him back in 6 weeks  He will plan to see him back in 6 weeks      Goals and Barriers:  Current Goal:  Prolong Survival from metastatic prostate cancer  Barriers: None  Patient's Capacity to Self Care:  Patient  able to self care  Portions of the record may have been created with voice recognition software  Occasional wrong word or "sound a like" substitutions may have occurred due to the inherent limitations of voice recognition software  Read the chart carefully and recognize, using context, where substitutions have occurred

## 2022-12-23 ENCOUNTER — DOCUMENTATION (OUTPATIENT)
Dept: HEMATOLOGY ONCOLOGY | Facility: CLINIC | Age: 77
End: 2022-12-23

## 2022-12-23 DIAGNOSIS — C79.51 MALIGNANT NEOPLASM METASTATIC TO BONE (HCC): Primary | ICD-10-CM

## 2022-12-23 RX ORDER — ABIRATERONE 500 MG/1
1000 TABLET ORAL DAILY
Qty: 60 TABLET | Refills: 0 | Status: SHIPPED | OUTPATIENT
Start: 2022-12-23

## 2022-12-23 NOTE — PROGRESS NOTES
Received request from clinical for patient to start on Abiraterone  Ernestina Oli has been submitted via cover my meds and this is pending Patel: Rich Anurag:       [id number]  PCN:      [id number]  ID:          [id number]    UPDATE:  [facility] has [facility] and [facility]  Clinical aware

## 2022-12-29 DIAGNOSIS — G89.29 CHRONIC MIDLINE LOW BACK PAIN: ICD-10-CM

## 2022-12-29 DIAGNOSIS — M54.50 CHRONIC MIDLINE LOW BACK PAIN: ICD-10-CM

## 2022-12-29 DIAGNOSIS — R26.2 AMBULATORY DYSFUNCTION: Chronic | ICD-10-CM

## 2022-12-29 DIAGNOSIS — K21.9 GASTROESOPHAGEAL REFLUX DISEASE WITHOUT ESOPHAGITIS: ICD-10-CM

## 2022-12-29 DIAGNOSIS — I73.9 PAD (PERIPHERAL ARTERY DISEASE) (HCC): ICD-10-CM

## 2022-12-30 ENCOUNTER — HOSPITAL ENCOUNTER (OUTPATIENT)
Dept: INFUSION CENTER | Facility: CLINIC | Age: 77
End: 2022-12-30

## 2022-12-30 DIAGNOSIS — C79.51 MALIGNANT NEOPLASM METASTATIC TO BONE (HCC): ICD-10-CM

## 2022-12-30 DIAGNOSIS — C61 PROSTATE CANCER (HCC): ICD-10-CM

## 2022-12-30 DIAGNOSIS — Z95.828 PORT-A-CATH IN PLACE: Primary | ICD-10-CM

## 2022-12-30 LAB
ALBUMIN SERPL BCP-MCNC: 3.8 G/DL (ref 3.5–5)
ALP SERPL-CCNC: 120 U/L (ref 34–104)
ALT SERPL W P-5'-P-CCNC: 9 U/L (ref 7–52)
ANION GAP SERPL CALCULATED.3IONS-SCNC: 10 MMOL/L (ref 4–13)
AST SERPL W P-5'-P-CCNC: 11 U/L (ref 13–39)
BASOPHILS # BLD AUTO: 0.06 THOUSANDS/ÂΜL (ref 0–0.1)
BASOPHILS NFR BLD AUTO: 1 % (ref 0–1)
BILIRUB SERPL-MCNC: 0.42 MG/DL (ref 0.2–1)
BUN SERPL-MCNC: 16 MG/DL (ref 5–25)
CALCIUM SERPL-MCNC: 8.4 MG/DL (ref 8.4–10.2)
CHLORIDE SERPL-SCNC: 108 MMOL/L (ref 96–108)
CO2 SERPL-SCNC: 23 MMOL/L (ref 21–32)
CREAT SERPL-MCNC: 0.71 MG/DL (ref 0.6–1.3)
EOSINOPHIL # BLD AUTO: 0.42 THOUSAND/ÂΜL (ref 0–0.61)
EOSINOPHIL NFR BLD AUTO: 8 % (ref 0–6)
ERYTHROCYTE [DISTWIDTH] IN BLOOD BY AUTOMATED COUNT: 14.1 % (ref 11.6–15.1)
GFR SERPL CREATININE-BSD FRML MDRD: 90 ML/MIN/1.73SQ M
GLUCOSE SERPL-MCNC: 89 MG/DL (ref 65–140)
HCT VFR BLD AUTO: 32.3 % (ref 36.5–49.3)
HGB BLD-MCNC: 9.9 G/DL (ref 12–17)
IMM GRANULOCYTES # BLD AUTO: 0.04 THOUSAND/UL (ref 0–0.2)
IMM GRANULOCYTES NFR BLD AUTO: 1 % (ref 0–2)
LYMPHOCYTES # BLD AUTO: 1.78 THOUSANDS/ÂΜL (ref 0.6–4.47)
LYMPHOCYTES NFR BLD AUTO: 32 % (ref 14–44)
MCH RBC QN AUTO: 27.8 PG (ref 26.8–34.3)
MCHC RBC AUTO-ENTMCNC: 30.7 G/DL (ref 31.4–37.4)
MCV RBC AUTO: 91 FL (ref 82–98)
MONOCYTES # BLD AUTO: 0.48 THOUSAND/ÂΜL (ref 0.17–1.22)
MONOCYTES NFR BLD AUTO: 9 % (ref 4–12)
NEUTROPHILS # BLD AUTO: 2.82 THOUSANDS/ÂΜL (ref 1.85–7.62)
NEUTS SEG NFR BLD AUTO: 49 % (ref 43–75)
NRBC BLD AUTO-RTO: 0 /100 WBCS
PLATELET # BLD AUTO: 400 THOUSANDS/UL (ref 149–390)
PMV BLD AUTO: 9.2 FL (ref 8.9–12.7)
POTASSIUM SERPL-SCNC: 3.8 MMOL/L (ref 3.5–5.3)
PROT SERPL-MCNC: 7.1 G/DL (ref 6.4–8.4)
RBC # BLD AUTO: 3.56 MILLION/UL (ref 3.88–5.62)
SODIUM SERPL-SCNC: 141 MMOL/L (ref 135–147)
WBC # BLD AUTO: 5.6 THOUSAND/UL (ref 4.31–10.16)

## 2022-12-30 RX ORDER — METHOCARBAMOL 750 MG/1
750 TABLET, FILM COATED ORAL
Qty: 90 TABLET | Refills: 0 | Status: SHIPPED | OUTPATIENT
Start: 2022-12-30

## 2022-12-30 RX ORDER — GABAPENTIN 300 MG/1
600 CAPSULE ORAL 2 TIMES DAILY
Qty: 120 CAPSULE | Refills: 0 | OUTPATIENT
Start: 2022-12-30

## 2022-12-30 NOTE — PROGRESS NOTES
Patient arrives to infusion center for lab draw today  Patient offers no acute complaints  R PAC accessed without issue, brisk blood return noted, labs collected and sent as per orders  Port flushed well without resistance, deaccessed, bandaid in place  Patient aware of 1/3 appt for Lupron and Xgeva  Patient declines AVS, ambulatory with cane upon DC

## 2022-12-31 LAB — PSA SERPL-MCNC: 170.3 NG/ML (ref 0–4)

## 2023-01-03 ENCOUNTER — HOSPITAL ENCOUNTER (OUTPATIENT)
Dept: INFUSION CENTER | Facility: CLINIC | Age: 78
Discharge: HOME | End: 2023-01-03

## 2023-01-03 DIAGNOSIS — C79.51 MALIGNANT NEOPLASM METASTATIC TO BONE (HCC): ICD-10-CM

## 2023-01-03 DIAGNOSIS — C61 PROSTATE CANCER (HCC): Primary | ICD-10-CM

## 2023-01-03 RX ORDER — FAMOTIDINE 20 MG/1
TABLET, FILM COATED ORAL
Qty: 90 TABLET | Refills: 1 | Status: SHIPPED | OUTPATIENT
Start: 2023-01-03

## 2023-01-03 RX ADMIN — DENOSUMAB 120 MG: 120 INJECTION SUBCUTANEOUS at 09:33

## 2023-01-03 RX ADMIN — LEUPROLIDE ACETATE 22.5 MG: KIT at 09:36

## 2023-01-03 NOTE — PROGRESS NOTES
Patient here for xgeva and lupron injections, xgeva given in LLQ, lupron given R glute  Labs reviewed from 12/30, Ca 8 4 and CrCl 80 1 within parameters for treatment  Patient denies recent or upcoming dental work  Patient scheduled for next labs and injections, AVS declined

## 2023-01-03 NOTE — TELEPHONE ENCOUNTER
Royce Rene has requested a refill of famotidine (PEPCID) 20 mg tablet  Pt meets the requirements placed by [facility]  Will refill medication

## 2023-01-04 ENCOUNTER — PATIENT OUTREACH (OUTPATIENT)
Dept: CASE MANAGEMENT | Facility: HOSPITAL | Age: 78
End: 2023-01-04

## 2023-01-04 NOTE — PROGRESS NOTES
MSW phoned and spoke with patient who states he has three children-camille, polly and Juliette Sarah  Patient states his children do not have time for him they are busy with work  Patient states he received meals on wheels and it has been working out fine  Patient states he would like to do a Will but needs help with completing the Will  Patient states he has difficulty seeing witting and cannot do the Will himself  MSW will e-mail patient websites to obtain Perroy  Patient states he does not want a  to draw up the Will because he does not want to pay a lot of money for the Will  Patient states he wants to make sure his son gets the house  Patient gave this MSW permission to call his son Glenda Schumacher to ask him to help with Will  MSW phoned and left a message for Polly to call back

## 2023-01-11 DIAGNOSIS — C79.51 MALIGNANT NEOPLASM METASTATIC TO BONE (HCC): ICD-10-CM

## 2023-01-11 RX ORDER — ABIRATERONE ACETATE 250 MG/1
TABLET ORAL
Qty: 120 TABLET | Refills: 10 | Status: SHIPPED | OUTPATIENT
Start: 2023-01-11

## 2023-01-12 ENCOUNTER — REMOTE DEVICE CLINIC VISIT (OUTPATIENT)
Dept: CARDIOLOGY CLINIC | Facility: CLINIC | Age: 78
End: 2023-01-12

## 2023-01-12 DIAGNOSIS — Z95.810 BIVENTRICULAR ICD (IMPLANTABLE CARDIOVERTER-DEFIBRILLATOR) IN PLACE: Primary | ICD-10-CM

## 2023-01-12 NOTE — PROGRESS NOTES
Results for orders placed or performed in visit on 01/12/23   Cardiac EP device report    Narrative    [facility] BI-V ICD -ACTIVE SYSTEM IS MRI CONDITIONAL  MERLIN TRANSMISSION: BATTERY VOLTAGE ADEQUATE (5 7 YRS)  AP 58% BVP 98%; ALL AVAILABLE LEAD PARAMETERS WITHIN NORMAL LIMITS  2 VT-NS EPISODES (NO EGM'S STORED FOR REVIEW); 16 AMS EPISODES, MAX EPISODE DURATION 8 SECS WITH BRIEF PAT ON EGM'S  AT/AF BURDEN <1%; EF 35% (12/2022 ECHO); PATIENT ON ASA 81, CLOPIDOGREL, METOPROLOL SUCC  CORVUE IMPEDANCE MONITORING WITHIN NORMAL LIMITS  APPROPRIATELY FUNCTIONING BI-V ICD   ES

## 2023-01-20 ENCOUNTER — HOSPITAL ENCOUNTER (OUTPATIENT)
Dept: INFUSION CENTER | Facility: CLINIC | Age: 78
Discharge: HOME | End: 2023-01-20

## 2023-01-20 DIAGNOSIS — Z95.828 PORT-A-CATH IN PLACE: ICD-10-CM

## 2023-01-20 DIAGNOSIS — C61 PROSTATE CANCER (HCC): Primary | ICD-10-CM

## 2023-01-20 LAB
ALBUMIN SERPL BCP-MCNC: 3.7 G/DL (ref 3.5–5)
ALP SERPL-CCNC: 134 U/L (ref 34–104)
ALT SERPL W P-5'-P-CCNC: 11 U/L (ref 7–52)
ANION GAP SERPL CALCULATED.3IONS-SCNC: 6 MMOL/L (ref 4–13)
AST SERPL W P-5'-P-CCNC: 12 U/L (ref 13–39)
BASOPHILS # BLD AUTO: 0.03 THOUSANDS/ÂΜL (ref 0–0.1)
BASOPHILS NFR BLD AUTO: 1 % (ref 0–1)
BILIRUB SERPL-MCNC: 0.69 MG/DL (ref 0.2–1)
BUN SERPL-MCNC: 16 MG/DL (ref 5–25)
CALCIUM SERPL-MCNC: 8.3 MG/DL (ref 8.4–10.2)
CHLORIDE SERPL-SCNC: 109 MMOL/L (ref 96–108)
CO2 SERPL-SCNC: 24 MMOL/L (ref 21–32)
CREAT SERPL-MCNC: 0.67 MG/DL (ref 0.6–1.3)
EOSINOPHIL # BLD AUTO: 0.38 THOUSAND/ÂΜL (ref 0–0.61)
EOSINOPHIL NFR BLD AUTO: 9 % (ref 0–6)
ERYTHROCYTE [DISTWIDTH] IN BLOOD BY AUTOMATED COUNT: 15.5 % (ref 11.6–15.1)
GFR SERPL CREATININE-BSD FRML MDRD: 92 ML/MIN/1.73SQ M
GLUCOSE SERPL-MCNC: 98 MG/DL (ref 65–140)
HCT VFR BLD AUTO: 31.6 % (ref 36.5–49.3)
HGB BLD-MCNC: 10 G/DL (ref 12–17)
IMM GRANULOCYTES # BLD AUTO: 0.02 THOUSAND/UL (ref 0–0.2)
IMM GRANULOCYTES NFR BLD AUTO: 1 % (ref 0–2)
LYMPHOCYTES # BLD AUTO: 1.47 THOUSANDS/ÂΜL (ref 0.6–4.47)
LYMPHOCYTES NFR BLD AUTO: 34 % (ref 14–44)
MCH RBC QN AUTO: 29 PG (ref 26.8–34.3)
MCHC RBC AUTO-ENTMCNC: 31.6 G/DL (ref 31.4–37.4)
MCV RBC AUTO: 92 FL (ref 82–98)
MONOCYTES # BLD AUTO: 0.49 THOUSAND/ÂΜL (ref 0.17–1.22)
MONOCYTES NFR BLD AUTO: 11 % (ref 4–12)
NEUTROPHILS # BLD AUTO: 1.96 THOUSANDS/ÂΜL (ref 1.85–7.62)
NEUTS SEG NFR BLD AUTO: 44 % (ref 43–75)
NRBC BLD AUTO-RTO: 0 /100 WBCS
PLATELET # BLD AUTO: 215 THOUSANDS/UL (ref 149–390)
PMV BLD AUTO: 9.4 FL (ref 8.9–12.7)
POTASSIUM SERPL-SCNC: 3.8 MMOL/L (ref 3.5–5.3)
PROT SERPL-MCNC: 6.1 G/DL (ref 6.4–8.4)
RBC # BLD AUTO: 3.45 MILLION/UL (ref 3.88–5.62)
SODIUM SERPL-SCNC: 139 MMOL/L (ref 135–147)
WBC # BLD AUTO: 4.35 THOUSAND/UL (ref 4.31–10.16)

## 2023-01-20 NOTE — PROGRESS NOTES
Patient to infusion for Labs  He offers no complaints  Port accessed and labs drawn per protocol    Next appointment confirmed, he declined AVS

## 2023-01-23 DIAGNOSIS — I50.22 CHRONIC HFREF (HEART FAILURE WITH REDUCED EJECTION FRACTION) (HCC): ICD-10-CM

## 2023-01-23 RX ORDER — FUROSEMIDE 20 MG/1
TABLET ORAL
Qty: 90 TABLET | Refills: 1 | Status: SHIPPED | OUTPATIENT
Start: 2023-01-23

## 2023-01-24 ENCOUNTER — OFFICE VISIT (OUTPATIENT)
Dept: PALLIATIVE MEDICINE | Facility: CLINIC | Age: 78
End: 2023-01-24

## 2023-01-24 VITALS
DIASTOLIC BLOOD PRESSURE: 80 MMHG | BODY MASS INDEX: 32.28 KG/M2 | SYSTOLIC BLOOD PRESSURE: 140 MMHG | HEART RATE: 82 BPM | WEIGHT: 206.13 LBS | TEMPERATURE: 98 F | OXYGEN SATURATION: 97 %

## 2023-01-24 DIAGNOSIS — R26.2 AMBULATORY DYSFUNCTION: Chronic | ICD-10-CM

## 2023-01-24 DIAGNOSIS — G89.29 CHRONIC MIDLINE LOW BACK PAIN: ICD-10-CM

## 2023-01-24 DIAGNOSIS — M54.50 CHRONIC MIDLINE LOW BACK PAIN: ICD-10-CM

## 2023-01-24 DIAGNOSIS — I73.9 PAD (PERIPHERAL ARTERY DISEASE) (HCC): ICD-10-CM

## 2023-01-24 DIAGNOSIS — T78.40XD ALLERGY, SUBSEQUENT ENCOUNTER: ICD-10-CM

## 2023-01-24 RX ORDER — GABAPENTIN 300 MG/1
600 CAPSULE ORAL 2 TIMES DAILY
Qty: 120 CAPSULE | Refills: 0 | Status: SHIPPED | OUTPATIENT
Start: 2023-01-24

## 2023-01-24 RX ORDER — METHOCARBAMOL 750 MG/1
750 TABLET, FILM COATED ORAL
Qty: 90 TABLET | Refills: 0 | Status: SHIPPED | OUTPATIENT
Start: 2023-01-24

## 2023-01-24 RX ORDER — CETIRIZINE HYDROCHLORIDE 5 MG/1
5 TABLET ORAL DAILY
Qty: 90 TABLET | Refills: 3 | Status: SHIPPED | OUTPATIENT
Start: 2023-01-24

## 2023-01-24 NOTE — PROGRESS NOTES
Outpatient Follow-Up - Palliative and Supportive Care   Genevieve Rene 68 y o  male [id number]    Assessment & Plan  Problem List Items Addressed This Visit    None      Medications adjusted this encounter:  Requested Prescriptions      No prescriptions requested or ordered in this encounter     No orders of the defined types were placed in this encounter  There are no discontinued medications  - Ongoing close f/up in Palliative clinic  Next visit 4 wks  - Pt continues to be an ongoing risk for medication misuse:   = blind in one eye   = Limited medical savvy   = Fairly hard of hearing   = Little oversight or support from his family in medication checks  - No changes to meds today  Will send 1 mo of Rx  - Oncology SW :  Ms Alvin Zamora following for:   = Financial difficulties - pt STILL using dead wife's broken cane as ambulatory aid   = Transport - pt has visual challenges and relies very much on son for transport  Son also needs to work  = Limited health literacy - does not know names of meds; has trouble reading bottles d/t visual impairment    = Hard of hearing - would benefit from hearing aid      Tahir Joaquin was seen today for symptoms and planning cares related to above illnesses  I have reviewed the patient's controlled substance dispensing history in the Prescription Drug Monitoring Program in compliance with the [facility] regulations before prescribing any controlled substances  They are invited to continue to follow with us  If there are questions or concerns, please contact us through our clinic/answering service 24 hours a day, seven days a week      Nancy Burt  [facility] Palliative and Supportive Care  [phone]      Visit Information    Accompanied By: none    Source of History: Self    History Limitations: limited health literacy, dementia    Contacts: son Sarah Villagomez - [phone]               Daughter Mirtha Peace - [phone]    History of Present Illness      Genevieve Rene is a 68 y o  male who presents in follow up of symptoms related to Stage IV prostate cancer, hormone treatment resistant  He follow with Dr Shaji Fraser and Ms Larson Fairly for this  There is also a significant cardiovascular history, with heart failure management by Dr Bhupendra Gates, and PAD with distal pain at rest in both feet  Pertinent issues include: symptom management, resource management  Since last visit, he has been given a calendar with his upcoming appointments highl, which she shows me today  He is getting transport support from his family, not necessarily Cancer care team   He was brought today to clinic by a friend of his son  We considered [facility] [employer] today, and pt is interested in this service  He has troubles ambulating, and he has visual challenges that should prevent him from driving  He also endorses losing items about his home, and a concern about his memory  Later, our office staff confirmed that the pt does NOT actually have a calendar that matches his treatment schedule, and while [facility] [employer] was attempted to be set up, the pt declined any further assistance from the team, stating he did not understand what she was saying  Previously, in Winter 2022, pt has mohan told that his PSA is climbing markedly, but not what plan has been advised for this  Pt arrives without his son to clinic today, and -- as per his usual -- he does not the names, amounts, indications, nor uses of his meds  He does request refills, but has trouble reading his own notes  pt's son Cynthia Varela is still living in home, but cannot provide enough cares to pt  Cynthia Varela is curerntly working full time to support the household  Family would like to obtain waiver services to denise income to son for support he is already providing  Pt notes memory challenges complicating his medication usage, and with ADLs  He has trouble standing to dress, shower, shave, brush teeth    Chores about the house are nearly impossible because he cannot bend over, and he can't stand for more than about 3min at a time  This complicates his ability to cook for self as well  He does not wish to leave home, but would appreciate aids in the home to help clean  Has a Roomba to get some of the surfaces / floors in home  Thea Mcdaniel has a fixed income, and has little disposible income after living expenses  We have received a Nursing Home Eligibility form, which I will fill out today, having now better understood what the pt's current needs and situation are  In office in Fall 2022, the pt cannot say where his medicines come from, nor exactly how they arrive at his home  He knows that his son sometimes picks up certain meds direct from pharmacy  Pt states he has an infusion appointment today, but he doesn't know why, and hypothesizes this is in response to an episode of chest pain he had two or three days ago  In a separate phone call to pt's son, we confirm that:   - Pt is not leaving home, and pt has significant ambulatory challenges about the home    - Pt's meds are set up by the son, and picked up in person when he has the chance  - Some meds might be delivered by [facility], but son has not coordinated this  - Son is not aware of any chest pain reported by the patient; he remembers that the pt's device was interrogated some months ago, in the Spring of 2022, and not since that time    - Son is driving pt to all appts, and still seeking waiver services  [facility] has some rates at $19+/hr, which would be preferable income over Network quoted rates of $12/hr  Diarreha controlled with Lomotil, only occurs on weeks that he has chemo  He states his pain is controlled with 'that medication that starts with an O', though he cannot recall the name of this med specifically  That all being said, he is consistent about pill usage, and has not run out early  He is able to read the instructions on bottles      Per Dr Brad Mejia, the pt is not a candidate for traditional cytotoxic therapy, but he may continue on supportive therapies and hormone blockade  That being said, Dr Bernda Saldivar last sentence in his plan from 8/17: "He is not a candidate for chemotherapy at this point as I do not think he will tolerate this well  If needed we can readdress this if his PSA continues to go up "    Pt has not seen PCP in quite some time, and has no desire to return unless very strongly indicated  Getting Meals on Wheels, but having trouble both paying for additional meals, and standing long enough to do dishes and prep food  He inquires today about food stamps, as she has spent down over half his life savings in order to pay for cares and living expenses  From our first visit on 3/1:    "Pt has demonstrated -- over a year ago now -- progression of his illness (rising PSA and decreasing functional status) despite aggressive therapies and cytotoxic treatments  His side effects vs decompensation in his overall illness was so severe he was admitted to [address] [place] [address] in march 2021  Since that time, he has recovered some function with careful management of his cardiac disease, and less aggressive, less toxic cancer cares  At this time, his treatment plan involves Xofigo for bone-avid disease, +/- Lupron  We note that the patient has demonstrated some disease progression on other hormonal blockers, but that this plan of care is being offered, if not rec'd, by Dr Ana M Hall  Pt presents to our office with suboptimally controlled pain, ongoing wt loss, poor appetite, and general malaise    His concerns are more related to his back and R foot pain; a Podiatrist today advised him that his unilateral neuropathic symptoms 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        Today, he can't quite recall his medications, he just insists that he needs 'something stronger than tynlenol'  When we pointed out that he has used tramadol and oxyIR recently, he also stated that these medications were not helpful  Pt endorses support from his son, who will drive pt to appts and on other errands  Pt can't name his meds, but has some vague understanding of the indications for his meds "    Past medical, surgical, social, and family histories are reviewed and pertinent updates are made  Review of Systems   Unable to perform ROS: dementia         Vital Signs    /80 (BP Location: Left arm, Cuff Size: Standard)   Pulse 82   Temp 98 °F (36 7 °C) (Temporal)   Wt 93 5 kg (206 lb 2 1 oz)   SpO2 97%   BMI 32 28 kg/m²     Physical Exam and Objective Data  Physical Exam  Constitutional:       General: He is not in acute distress  Appearance: He is ill-appearing  He is not toxic-appearing or diaphoretic  Comments: Frail   HENT:      Head: Normocephalic and atraumatic  Right Ear: External ear normal       Left Ear: External ear normal    Eyes:      General:         Right eye: No discharge  Left eye: No discharge  Conjunctiva/sclera: Conjunctivae normal       Pupils: Pupils are equal, round, and reactive to light  Neck:      Trachea: No tracheal deviation  Cardiovascular:      Rate and Rhythm: Normal rate and regular rhythm  Pulmonary:      Effort: Pulmonary effort is normal  No respiratory distress  Breath sounds: No stridor  Abdominal:      Palpations: Abdomen is soft  Comments: scaphoid   Skin:     General: Skin is warm and dry  Coloration: Skin is pale  Findings: No erythema or rash  Comments: Poor turgor   Neurological:      General: No focal deficit present  Mental Status: He is alert  He is disoriented  Cranial Nerves: No cranial nerve deficit  Psychiatric:      Comments: Cannot well participate in exam   Seems to have poor grasp on concept of transport services by [facility] instead of public transport or private car         Radiology and Laboratory:  I personally reviewed and interpreted the following results: none new    25+ minutes was spent face to face with Royce Rene  with greater than 50% of the time spent in counseling or coordination of care including: chart review; symptom pursuit; supportive listening; anticipatory guidance  Consideration of safety issues for a frail and vulnerable elder  All of the patient's or agent's questions were answered during this discussion

## 2023-02-03 ENCOUNTER — TELEPHONE (OUTPATIENT)
Dept: HEMATOLOGY ONCOLOGY | Facility: HOSPITAL | Age: 78
End: 2023-02-03

## 2023-02-03 DIAGNOSIS — C61 PROSTATE CANCER (HCC): Primary | ICD-10-CM

## 2023-02-03 NOTE — TELEPHONE ENCOUNTER
02/03/23    Was trying to reach out to patient reminding PSA test before his upcoming with Dr Rylie Gatica on February 8  Changed from [place] to [place]  Recommended to trend the PSA  Voicemail box is full  I will keep him as scheduled

## 2023-02-08 ENCOUNTER — TELEPHONE (OUTPATIENT)
Dept: HEMATOLOGY ONCOLOGY | Facility: CLINIC | Age: 78
End: 2023-02-08

## 2023-02-08 ENCOUNTER — OFFICE VISIT (OUTPATIENT)
Dept: HEMATOLOGY ONCOLOGY | Facility: CLINIC | Age: 78
End: 2023-02-08

## 2023-02-08 VITALS
HEIGHT: 67 IN | SYSTOLIC BLOOD PRESSURE: 136 MMHG | WEIGHT: 208 LBS | OXYGEN SATURATION: 92 % | DIASTOLIC BLOOD PRESSURE: 86 MMHG | HEART RATE: 84 BPM | RESPIRATION RATE: 14 BRPM | TEMPERATURE: 98 F | BODY MASS INDEX: 32.65 KG/M2

## 2023-02-08 DIAGNOSIS — C61 PROSTATE CANCER (HCC): Primary | ICD-10-CM

## 2023-02-08 NOTE — PROGRESS NOTES
Hematology/Oncology Outpatient Follow- up Note  Keysha Rene 68 y o  male MRN: @ Encounter: [id number]        Date:  2/8/2023    Presenting Complaint/Diagnosis : Metastatic prostate cancer    HPI:      Flavia Causey 68year old male with multiple medical conditions including a significant cardiovascular history, heart failure, PAD  He has [id number] On[facility] since 10/2021 for stage IV prostate cancer  He was previously treated at an outside institution   Per review of his EMR, he was initially diagnosed with a Francisco Javier 7 prostate cancer s/p treatment with external beam radiation from 10/2003 to 12/2003  He also got neoadjuvant and adjuvant LHRH for 9 months after radiation  Reba Evangelista was observed until July of 2008 when he was noted to have rising PSA and imaging done at that time showed new bony lesions   He was restarted on Lupron   He had evidence of disease progression on imaging completed in June of 2017  Enzalutamide was added to his regimen   His PSA continued to rise and he had progressive bony metastases including to the sacrum for which he received a course of palliative radiation, which was completed in February of 2019  He was then restarted on enzalutamide but a scan in December of 2019 showed worsening bone metastases so he was started on docetaxel in March of 2020   He was kept on this for approximately a year with his PSA slowly rising  Cliff Hu was admitted to a hospital for cardiac issues and then was lost to follow up with his previous oncologist      He established care with me  and was restarted on Lupron in 10/2021  He was referred to nuclear Medicine and treated with Xofigo from 11/4/2021 to 4/2022     He was rechallenged with Emmette Fuelling in addition to Lupron in May 2022     Neville Lackey was added to his regimen in August 2022    Emmette Fuelling    Previous Hematologic/ Oncologic History:      As above     Current Hematologic/ Oncologic Treatment:      Lupron every 3 omega,  Genesis Remy,    Interval History:      The patient returns for follow-up visit  He is doing really well and is at baseline  Is still fatigued  Walks with a cane  Denies any nausea vomiting  Denies any diarrhea  Blood work is stable  He is set up to start Pluvicto in the next few weeks  Is currently on Zytiga along with Xgeva and Lupron  He will stay on these and start Pluvicto  Denies any nausea any vomiting denies diarrhea  Rest of his 14 point review of systems today was negative  Test Results:    Imaging: Cardiac EP device report    Result Date: 1/12/2023  Narrative: [facility] BI-V ICD -ACTIVE SYSTEM IS MRI CONDITIONAL MERLIN TRANSMISSION: BATTERY VOLTAGE ADEQUATE (5 7 YRS)  AP 58% BVP 98%; ALL AVAILABLE LEAD PARAMETERS WITHIN NORMAL LIMITS  2 VT-NS EPISODES (NO EGM'S STORED FOR REVIEW); 16 AMS EPISODES, MAX EPISODE DURATION 8 SECS WITH BRIEF PAT ON EGM'S  AT/AF BURDEN <1%; EF 35% (12/2022 ECHO); PATIENT ON ASA 81, CLOPIDOGREL, METOPROLOL SUCC  CORVUE IMPEDANCE MONITORING WITHIN NORMAL LIMITS  APPROPRIATELY FUNCTIONING BI-V ICD  ES       Labs:   Lab Results   Component Value Date    WBC 4 35 01/20/2023    HGB 10 0 (L) 01/20/2023    HCT 31 6 (L) 01/20/2023    MCV 92 01/20/2023     01/20/2023     Lab Results   Component Value Date    K 3 8 01/20/2023     (H) 01/20/2023    CO2 24 01/20/2023    BUN 16 01/20/2023    CREATININE 0 67 01/20/2023    GLUF 84 10/10/2022    CALCIUM 8 3 (L) 01/20/2023    CORRECTEDCA 9 2 06/23/2021    AST 12 (L) 01/20/2023    ALT 11 01/20/2023    ALKPHOS 134 (H) 01/20/2023    EGFR 92 01/20/2023       Lab Results   Component Value Date     3 (H) 12/30/2022     6 (H) 12/20/2022    PSA 76 4 (H) 11/08/2022     ROS: As stated in the history of present illness otherwise his 14 point review of systems today was negative        Active Problems:   Patient Active Problem List   Diagnosis   • Chronic combined systolic and diastolic CHF (congestive heart failure) ([address] ) • S/P AVR   • Anemia   • Chronic midline low back pain   • Ischemic cardiomyopathy   • Ischemic leg pain   • Prostate cancer ([place] [address] )   • CAD (coronary artery disease)   • Hypotension   • Lower extremity pain   • Urinary retention   • PAD (peripheral artery disease) ([facility])   • Port-A-Cath in place   • Malignant neoplasm metastatic to bone [facility])   • Embolism and 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 ([facility])   • Depression, recurrent (H[facility])   • Weight loss   • Muscular deconditioning   • Cancer-related pain   • Palliative care patient   • Ambulatory dysfunction   • Peripheral neuropathy   • Financial problems   • Functional diarrhea       Past Medical History:   Past Medical History:   Diagnosis Date   • Cancer ([place] [address] ) 2002    tailbone   • Coronary artery disease     S/p stenting to circumflex and RCA   • HFrEF (heart failure with reduced ejection fraction) ([place] [address] ) 2021   • High cholesterol    • Prostate cancer [facility])        Surgical History:   Past Surgical History:   Procedure Laterality Date   • CARDIAC ELECTROPHYSIOLOGY PROCEDURE N/A 2021    Procedure: Implant ICD - bi ventricular;  Surgeon: Bernie Bell MD;  Location: [facility] CARDIAC CATH LAB; Service: Cardiology   • CARDIAC SURGERY     • NC TEAEC W/WO PATCH GRAFT COMMON FEMORAL Right 2021    Procedure: ENDARTERECTOMY ARTERIAL FEMORAL;  Surgeon: Sole Mcintosh DO;  Location: [facility];  Service: Vascular       Family History:  No family history on file  Cancer-related family history is not on file  Social History:   Social History     Socioeconomic History   • Marital status:       Spouse name: Not on file   • Number of children: 3   • Years of education: Not on file   • Highest education level: Not on file   Occupational History   • Not on file   Tobacco Use   • Smoking status: Former     Years:      Types: Cigarettes     Quit date: 2002     Years since quittin 6   • Smokeless tobacco: Never   Vaping Use   • Vaping Use: Never used   Substance and Sexual Activity   • Alcohol use: Not Currently   • Drug use: Not on file   • Sexual activity: Not on file   Other Topics Concern   • Not on file   Social History Narrative   • Not on file     Social Determinants of Health     Financial Resource Strain: Not on file   Food Insecurity: Not on file   Transportation Needs: Not on file   Physical Activity: Not on file   Stress: Not on file   Social Connections: Not on file   Intimate Partner Violence: Not on file   Housing Stability: Not on file       Current Medications:   Current Outpatient Medications   Medication Sig Dispense Refill   • abiraterone (ZYTIGA) 250 mg tablet Take 4 tablets (1,000 mg total) by mouth once daily 120 tablet 10   • acetaminophen (TYLENOL) 325 mg tablet Take 3 tablets (975 mg total) by mouth every 8 (eight) hours (Patient taking differently: Take 650 mg by mouth 3 (three) times a day) 90 tablet 0   • aspirin 81 mg chewable tablet Chew 1 tablet (81 mg total) daily 30 tablet 0   • atorvastatin (LIPITOR) 80 mg tablet Take 1 tablet (80 mg total) by mouth daily with dinner 90 tablet 1   • clopidogrel (PLAVIX) 75 mg tablet Take 1 tablet (75 mg total) by mouth daily 90 tablet 0   • Empagliflozin (JARDIANCE) 10 MG TABS tablet Take 1 tablet (10 mg total) by mouth every morning 30 tablet 11   • enzalutamide (Xtandi) 40 mg capsule Take 4 capsules (160 mg total) by mouth in the morning  120 capsule 10   • famotidine (PEPCID) 20 mg tablet TAKE 1 TABLET BY MOUTH EVERY DAY 90 tablet 1   • ferrous sulfate 325 (65 Fe) mg tablet TAKE 1 TABLET BY MOUTH TWICE A DAY WITH MEALS 180 tablet 1   • furosemide (LASIX) 20 mg tablet TAKE 1 TABLET BY MOUTH EVERY DAY 90 tablet 1   • gabapentin (NEURONTIN) 300 mg capsule Take 2 capsules (600 mg total) by mouth 2 (two) times a day To reduce nerve pain in feet   120 capsule 0   • ibuprofen (MOTRIN) 200 mg tablet Take 200 mg by mouth every 4 (four) hours as needed for mild pain     • leuprolide (Eligard) 22 5 mg      • lidocaine-prilocaine (EMLA) cream Apply topically as needed (nerve pain) In Right leg 30 g 1   • loperamide (IMODIUM) 2 mg capsule Take 1 capsule (2 mg total) by mouth 4 (four) times a day as needed for diarrhea 30 capsule 2   • losartan (COZAAR) 25 mg tablet TAKE 1/2 TABLET BY MOUTH EVERY DAY 45 tablet 1   • methocarbamol (ROBAXIN) 750 mg tablet Take 1 tablet (750 mg total) by mouth 3 (three) times a day with meals To reduce back pain and muscle pain 90 tablet 0   • metoprolol succinate (TOPROL-XL) 25 mg 24 hr tablet TAKE 1 TABLET BY MOUTH EVERY DAY (Patient taking differently: 1/2 tablet daily) 90 tablet 2   • ondansetron (ZOFRAN) 4 mg tablet Take 1 tablet (4 mg total) by mouth every 8 (eight) hours as needed for nausea or vomiting 30 tablet 3   • oxyCODONE (ROXICODONE) 10 MG TABS Take 1 tablet (10 mg total) by mouth 4 (four) times a day as needed (cancer pain) Max Daily Amount: 40 mg 120 tablet 0   • prochlorperazine (COMPAZINE) 10 mg tablet Take 1 tablet (10 mg total) by mouth every 6 (six) hours as needed for nausea or vomiting 45 tablet 3   • cetirizine (ZyrTEC) 5 MG tablet Take 1 tablet (5 mg total) by mouth daily (Patient not taking: Reported on 2/8/2023) 90 tablet 3     No current facility-administered medications for this visit  Allergies: No Known Allergies    Physical Exam:    Body surface area is 2 06 meters squared      Wt Readings from Last 3 Encounters:   02/08/23 94 3 kg (208 lb)   01/24/23 93 5 kg (206 lb 2 1 oz)   12/21/22 89 4 kg (197 lb)        Temp Readings from Last 3 Encounters:   02/08/23 98 °F (36 7 °C) (Temporal)   01/24/23 98 °F (36 7 °C) (Temporal)   12/21/22 97 8 °F (36 6 °C) (Temporal)        BP Readings from Last 3 Encounters:   02/08/23 136/86   01/24/23 140/80   12/21/22 126/80         Pulse Readings from Last 3 Encounters:   02/08/23 84   01/24/23 82   12/21/22 90        Physical Exam     Constitutional   General appearance: No acute distress, well appearing and well nourished  Eyes   Conjunctiva and lids: No swelling, erythema or discharge  Pupils and irises: Equal, round and reactive to light  Ears, Nose, Mouth, and Throat   External inspection of ears and nose: Normal     Nasal mucosa, septum, and turbinates: Normal without edema or erythema  Oropharynx: Normal with no erythema, edema, exudate or lesions  Pulmonary   Respiratory effort: No increased work of breathing or signs of respiratory distress  Auscultation of lungs: Clear to auscultation  Cardiovascular   Palpation of heart: Normal PMI, no thrills  Auscultation of heart: Normal rate and rhythm, normal S1 and S2, without murmurs  Examination of extremities for edema and/or varicosities: Normal     Carotid pulses: Normal     Abdomen   Abdomen: Non-tender, no masses  Liver and spleen: No hepatomegaly or splenomegaly  Lymphatic   Palpation of lymph nodes in neck: No lymphadenopathy  Musculoskeletal   Gait and station: Walks with a cane  Digits and nails: Normal without clubbing or cyanosis  Inspection/palpation of joints, bones, and muscles: Normal     Skin   Skin and subcutaneous tissue: Normal without rashes or lesions  Assessment / Plan:      The patient is a 49-year-old gentleman with widely metastatic prostate cancer who has progressed on multiple lines of therapy including chemotherapy with docetaxel      He is presently on treatment with Lupron, Xgeva and [employer] and is noted to have rising PSA   His PSA has more than doubled over the last few months   He does have PSMA positive disease as noted on most recent PET-CT scan    PSMA PET CT scan on 7/25/22 showed small focus of radiotracer uptake at the prostate suspicious for residual disease more conspicuous from the prior Axumin exam with innumerable osseous metastatic lesions with progression from prior Axumin PET-CT scan  Reyna Reddy was a small intracranial focus of uptake at midline which was nonspecific      I did to refer the patient for nuclear Medicine for consideration of Pluvitico since he has PSMA positive prostate cancer with rising PSA    He was seen by nuclear medicine and has been set up for the medication  He has been started on Zytiga at this point in addition to his Lupron and Xgeva  PSA has continued to rise although Ankush Hirsch is more recent so hopefully this will start to plateau and come down  His nuclear medicine appointment is coming up and he will be starting his new therapy with them in addition to the Maryelizabeth Reycollin and Lupron and Xgeva  We will continue him on this  We will see him back in 2 months  PSA will be repeated at that time  Hopefully he will have a response to pluvicto which she is starting in the next few weeks  He will need to be started with [facility] for his nuclear medicine appointments as I am doubtful he will make them all on time if he is driving himself  I have explained this to the patient in detail and I am also making sure social work is copied on this note  Goals and Barriers:  Current Goal:  Prolong Survival from Prosstate cancer  Barriers: None  Patient's Capacity to Self Care:  Patient be able to self care  Portions of the record may have been created with voice recognition software  Occasional wrong word or "sound a like" substitutions may have occurred due to the inherent limitations of voice recognition software  Read the chart carefully and recognize, using context, where substitutions have occurred

## 2023-02-10 ENCOUNTER — TELEPHONE (OUTPATIENT)
Dept: LAB | Facility: HOSPITAL | Age: 78
End: 2023-02-10

## 2023-02-10 ENCOUNTER — PATIENT OUTREACH (OUTPATIENT)
Dept: CASE MANAGEMENT | Facility: HOSPITAL | Age: 78
End: 2023-02-10

## 2023-02-10 NOTE — TELEPHONE ENCOUNTER
2/10 - pt states he needs done on Mondays - we do not service that area on Mondays - I called the Dr office and LM to see if we can do on a Thursday or Friday - told them to call us back

## 2023-02-10 NOTE — PROGRESS NOTES
MSW phoned and spoke with patient  MSW told patient his doctor's office sent this MSW a message stating the [id number] application was sent to Jini  Patient confirmed he has difficulty walking long distances and would benefit from [facility]  MSW communicated with Bernadette Courtney at [employer] dispatch via e-mail who confirmed they do have patient's application  Patient is informed he will need to call [facility] at [phone] to cancel transport only  MSW e-mailed patient's appointment schedule to [facility]  Patient requested transport to lab draws  MS phone mobile lab at [phone] who will contact patient to schedule mobile lab draws  Patient is informed of all above information  Patient is provided this MSW's phone number  MSW will continue to follow and patient is agreeable

## 2023-02-14 DIAGNOSIS — R26.2 AMBULATORY DYSFUNCTION: Chronic | ICD-10-CM

## 2023-02-14 DIAGNOSIS — Z51.5 PALLIATIVE CARE PATIENT: ICD-10-CM

## 2023-02-14 DIAGNOSIS — G89.29 CHRONIC MIDLINE LOW BACK PAIN: ICD-10-CM

## 2023-02-14 DIAGNOSIS — G89.3 CANCER RELATED PAIN: ICD-10-CM

## 2023-02-14 DIAGNOSIS — M54.50 CHRONIC MIDLINE LOW BACK PAIN: ICD-10-CM

## 2023-02-14 DIAGNOSIS — I73.9 PAD (PERIPHERAL ARTERY DISEASE) (HCC): ICD-10-CM

## 2023-02-14 DIAGNOSIS — C61 PROSTATE CANCER (HCC): ICD-10-CM

## 2023-02-14 NOTE — TELEPHONE ENCOUNTER
[id number] 01/06/2023 12/20/2022 oxyCODONE HCL (Tablet) 120 0 30 10 MG 15 0 VICKIE ARREOLAESTONE [facility]  Medicare 0 / 0 [place]     1 [id number] 12/08/2022 11/08/2022 oxyCODONE HCL (Tablet) 90 0 30 10 MG 15 0 VICKIE PIPESTONE [facility]   Medicare 0 / 0 [place]    1                   Next apt 3/2/23

## 2023-02-15 RX ORDER — OXYCODONE HYDROCHLORIDE 10 MG/1
10 TABLET ORAL 4 TIMES DAILY PRN
Qty: 120 TABLET | Refills: 0 | Status: SHIPPED | OUTPATIENT
Start: 2023-02-15

## 2023-02-15 RX ORDER — METHOCARBAMOL 750 MG/1
TABLET, FILM COATED ORAL
Qty: 90 TABLET | Refills: 0 | Status: SHIPPED | OUTPATIENT
Start: 2023-02-15

## 2023-02-15 RX ORDER — GABAPENTIN 300 MG/1
600 CAPSULE ORAL 2 TIMES DAILY
Qty: 120 CAPSULE | Refills: 0 | Status: SHIPPED | OUTPATIENT
Start: 2023-02-15

## 2023-02-17 ENCOUNTER — TELEPHONE (OUTPATIENT)
Dept: HEMATOLOGY ONCOLOGY | Facility: CLINIC | Age: 78
End: 2023-02-17

## 2023-02-17 NOTE — TELEPHONE ENCOUNTER
CALL RETURN FORM   Reason for patient call? Rosalia from [facility] [employer] is calling in regards to a fax she sent over for the patient  Rahel Quezada states she faxed over an alternative sign of care request form and she hasn’t gotten a response back  Patient's primary oncologist? Dr Юлия [place]    Name of person the patient was calling for? Dr Celestino Marcial team   Any additional information to add, if applicable? N/A   Informed patient that the message will be forwarded to the team and someone will get back to them as soon as possible    Did you relay this information to the patient?  YES

## 2023-02-20 ENCOUNTER — APPOINTMENT (OUTPATIENT)
Dept: LAB | Facility: CLINIC | Age: 78
End: 2023-02-20

## 2023-02-20 DIAGNOSIS — C79.51 MALIGNANT NEOPLASM METASTATIC TO BONE (HCC): ICD-10-CM

## 2023-02-20 DIAGNOSIS — C61 PROSTATE CANCER (HCC): ICD-10-CM

## 2023-02-20 LAB
ALBUMIN SERPL BCP-MCNC: 3.6 G/DL (ref 3.5–5)
ALP SERPL-CCNC: 121 U/L (ref 34–104)
ALT SERPL W P-5'-P-CCNC: 13 U/L (ref 7–52)
ANION GAP SERPL CALCULATED.3IONS-SCNC: 8 MMOL/L (ref 4–13)
AST SERPL W P-5'-P-CCNC: 12 U/L (ref 13–39)
BASOPHILS # BLD AUTO: 0.03 THOUSANDS/ÂΜL (ref 0–0.1)
BASOPHILS NFR BLD AUTO: 1 % (ref 0–1)
BILIRUB SERPL-MCNC: 0.85 MG/DL (ref 0.2–1)
BUN SERPL-MCNC: 12 MG/DL (ref 5–25)
CALCIUM SERPL-MCNC: 8.4 MG/DL (ref 8.4–10.2)
CHLORIDE SERPL-SCNC: 109 MMOL/L (ref 96–108)
CO2 SERPL-SCNC: 25 MMOL/L (ref 21–32)
CREAT SERPL-MCNC: 0.79 MG/DL (ref 0.6–1.3)
EOSINOPHIL # BLD AUTO: 0.2 THOUSAND/ÂΜL (ref 0–0.61)
EOSINOPHIL NFR BLD AUTO: 5 % (ref 0–6)
ERYTHROCYTE [DISTWIDTH] IN BLOOD BY AUTOMATED COUNT: 15.4 % (ref 11.6–15.1)
GFR SERPL CREATININE-BSD FRML MDRD: 86 ML/MIN/1.73SQ M
GLUCOSE SERPL-MCNC: 102 MG/DL (ref 65–140)
HCT VFR BLD AUTO: 34.3 % (ref 36.5–49.3)
HGB BLD-MCNC: 10.7 G/DL (ref 12–17)
IMM GRANULOCYTES # BLD AUTO: 0.02 THOUSAND/UL (ref 0–0.2)
IMM GRANULOCYTES NFR BLD AUTO: 1 % (ref 0–2)
LYMPHOCYTES # BLD AUTO: 1.5 THOUSANDS/ÂΜL (ref 0.6–4.47)
LYMPHOCYTES NFR BLD AUTO: 34 % (ref 14–44)
MCH RBC QN AUTO: 29 PG (ref 26.8–34.3)
MCHC RBC AUTO-ENTMCNC: 31.2 G/DL (ref 31.4–37.4)
MCV RBC AUTO: 93 FL (ref 82–98)
MONOCYTES # BLD AUTO: 0.68 THOUSAND/ÂΜL (ref 0.17–1.22)
MONOCYTES NFR BLD AUTO: 15 % (ref 4–12)
NEUTROPHILS # BLD AUTO: 1.99 THOUSANDS/ÂΜL (ref 1.85–7.62)
NEUTS SEG NFR BLD AUTO: 44 % (ref 43–75)
NRBC BLD AUTO-RTO: 0 /100 WBCS
PLATELET # BLD AUTO: 265 THOUSANDS/UL (ref 149–390)
PMV BLD AUTO: 10 FL (ref 8.9–12.7)
POTASSIUM SERPL-SCNC: 3.7 MMOL/L (ref 3.5–5.3)
PROT SERPL-MCNC: 6.1 G/DL (ref 6.4–8.4)
PSA SERPL-MCNC: 20.4 NG/ML (ref 0–4)
RBC # BLD AUTO: 3.69 MILLION/UL (ref 3.88–5.62)
SODIUM SERPL-SCNC: 142 MMOL/L (ref 135–147)
WBC # BLD AUTO: 4.42 THOUSAND/UL (ref 4.31–10.16)

## 2023-02-21 ENCOUNTER — TELEPHONE (OUTPATIENT)
Dept: HEMATOLOGY ONCOLOGY | Facility: CLINIC | Age: 78
End: 2023-02-21

## 2023-02-21 NOTE — TELEPHONE ENCOUNTER
CALL RETURN FORM   Reason for patient call? Lowell Luis is calling in regards to the patient  Loewll Smith states she faxed a for over for Dr Remington Puri to fill out and send back  Patient's primary oncologist? Dr Remington Puri    Name of person the patient was calling for? Dr Zeynep Vega team   Any additional information to add, if applicable? If there are any questions, please feel free to call Rosalia at the number provided  [phone]   Informed patient that the message will be forwarded to the team and someone will get back to them as soon as possible    Did you relay this information to the patient?   YES 96

## 2023-03-02 ENCOUNTER — OFFICE VISIT (OUTPATIENT)
Dept: PALLIATIVE MEDICINE | Facility: CLINIC | Age: 78
End: 2023-03-02

## 2023-03-02 ENCOUNTER — HOME HEALTH ADMISSION (OUTPATIENT)
Dept: HOME HEALTH SERVICES | Facility: HOME HEALTHCARE | Age: 78
End: 2023-03-02

## 2023-03-02 VITALS
TEMPERATURE: 97.3 F | DIASTOLIC BLOOD PRESSURE: 64 MMHG | SYSTOLIC BLOOD PRESSURE: 122 MMHG | WEIGHT: 211.42 LBS | HEART RATE: 86 BPM | BODY MASS INDEX: 33.11 KG/M2

## 2023-03-02 DIAGNOSIS — F33.9 DEPRESSION, RECURRENT (HCC): ICD-10-CM

## 2023-03-02 DIAGNOSIS — Z51.5 PALLIATIVE CARE PATIENT: ICD-10-CM

## 2023-03-02 DIAGNOSIS — F03.B0: Primary | ICD-10-CM

## 2023-03-02 DIAGNOSIS — G89.3 CANCER RELATED PAIN: ICD-10-CM

## 2023-03-02 DIAGNOSIS — I73.9 PAD (PERIPHERAL ARTERY DISEASE) (HCC): ICD-10-CM

## 2023-03-02 DIAGNOSIS — R26.2 AMBULATORY DYSFUNCTION: Chronic | ICD-10-CM

## 2023-03-02 DIAGNOSIS — C61 PROSTATE CANCER (HCC): ICD-10-CM

## 2023-03-02 DIAGNOSIS — C79.51 MALIGNANT NEOPLASM METASTATIC TO BONE (HCC): ICD-10-CM

## 2023-03-02 DIAGNOSIS — G62.9 PERIPHERAL NEUROPATHY: Chronic | ICD-10-CM

## 2023-03-02 DIAGNOSIS — I50.42 CHRONIC COMBINED SYSTOLIC AND DIASTOLIC CHF (CONGESTIVE HEART FAILURE) (HCC): ICD-10-CM

## 2023-03-02 RX ORDER — OXYCODONE HYDROCHLORIDE 10 MG/1
10 TABLET ORAL 4 TIMES DAILY PRN
Qty: 120 TABLET | Refills: 0 | Status: SHIPPED | OUTPATIENT
Start: 2023-03-13

## 2023-03-02 RX ORDER — GABAPENTIN 300 MG/1
600 CAPSULE ORAL 2 TIMES DAILY
Qty: 120 CAPSULE | Refills: 0 | Status: SHIPPED | OUTPATIENT
Start: 2023-03-13

## 2023-03-02 NOTE — PROGRESS NOTES
Outpatient Follow-Up - Palliative and Supportive Care   Syeda Rene 68 y o  male [id number]    Assessment & Plan  Problem List Items Addressed This Visit     Ambulatory dysfunction (Chronic)    Relevant Orders    Referral to [address] [place] [address]    Palliative care patient (Chronic)    Relevant Medications    oxyCODONE (ROXICODONE) 10 MG TABS (Start on 3/13/2023)    Peripheral neuropathy (Chronic)    Relevant Orders    Referral to [address] [place] [address]    Chronic combined systolic and diastolic CHF (congestive heart failure) ([facility] [address] )    Relevant Orders    Referral to [address] [place] [address]    Depression, recurrent ([facility] [address] )    Relevant Orders    Referral to [address] [place] [address]    Malignant neoplasm metastatic to bone [facility])    Relevant Orders    Referral to [address] [place] [address]    PAD (peripheral artery disease) ([facility] )    Relevant Medications    gabapentin (NEURONTIN) 300 mg capsule (Start on 3/13/2023)    Other Relevant Orders    Referral to [address] [place] [address]    Prostate cancer [facility])    Relevant Medications    oxyCODONE (ROXICODONE) 10 MG TABS (Start on 3/13/2023)    Other Relevant Orders    Referral to [address] [place] [address]   Other Visit Diagnoses     Moderate dementia    -  Primary    Relevant Orders    Referral to [address] [place] [address] related pain        Relevant Medications    oxyCODONE (ROXICODONE) 10 MG TABS (Start on 3/13/2023)          Medications adjusted this encounter:  Requested Prescriptions     Signed Prescriptions Disp Refills   • oxyCODONE (ROXICODONE) 10 MG TABS 120 tablet 0     Sig: Take 1 tablet (10 mg total) by mouth 4 (four) times a day as needed (cancer pain) Max Daily Amount: 40 mg Do not start before March 13, 2023  • gabapentin (NEURONTIN) 300 mg capsule 120 capsule 0     Sig: Take 2 capsules (600 mg total) by mouth 2 (two) times a day To reduce nerve pain in feet  Do not start before March 13, 2023       Orders Placed This Encounter Procedures   • Referral to [address] [place] [address] [facility]     Medications Discontinued During This Encounter   Medication Reason   • gabapentin (NEURONTIN) 300 mg capsule Reorder   • oxyCODONE (ROXICODONE) 10 MG TABS Reorder      - Ongoing close f/up in Palliative clinic  Next visit 4 wks  - Pt continues to be an ongoing risk for medication misuse:   = blind in one eye   = Limited medical savvy   = Fairly hard of hearing   = Little oversight or support from his family in medication checks  - No changes to meds today  Will send 1 mo of Rx  - Oncology SW :  Ms Elizabeth Wellington following for:   = Financial difficulties - pt STILL using dead wife's broken cane as ambulatory aid   = Transport - pt has visual challenges and relies very much on son for transport  Son also needs to work  = Limited health literacy - does not know names of meds; has trouble reading bottles d/t visual impairment    = Hard of hearing - would benefit from hearing aid      Mima Boswell was seen today for symptoms and planning cares related to above illnesses  I have reviewed the patient's controlled substance dispensing history in the Prescription Drug Monitoring Program in compliance with the [facility] regulations before prescribing any controlled substances  They are invited to continue to follow with us  If there are questions or concerns, please contact us through our clinic/answering service 24 hours a day, seven days a week  [facility] Palliative and Supportive Care        Visit Information    Accompanied By: none    Source of History: Self    History Limitations: limited health literacy, dementia    Contacts: son Donavan Rivera -                Daughter Aubree Morgan - [phone]    History of Present Illness      Aranza Rene is a 68 y o  male who presents in follow up of symptoms related to Stage IV prostate cancer, hormone treatment resistant  He follow with Dr Rylie Gatica and Ms Vin Gr for this  There is also a significant cardiovascular history, with heart failure management by Dr Stephanie Morales, and PAD with distal pain at rest in both feet  Pertinent issues include: symptom management, resource management  Since last visit, he missed a PET scan d/t acute illness  He could not navigate the health care system well enough how to understand how to cancel and reschedule his scan  He is advised today to work with Oncology to reschedule this, and a note was sent to his Onc care team to assist, given his vision, hearing, and cognitive limitations  He has had [facility] set up, and they delivered him to us today  He confirms that he has Meals on [employer] for midday meal   He eats breakfast by himself, but requires assist with evening meal prep and clean up  He states that his son 'is never around,' even though he lives in the house  Previously, we noted that he has been given a calendar with his upcoming appointments [facility], which she shows me today  He is getting transport support from his family, not necessarily Cancer care team   He was brought today to clinic by a friend of his son  He has troubles ambulating, and he has visual challenges that should prevent him from driving  He also endorses losing items about his home, and a concern about his memory  Later, our office staff confirmed that the pt does NOT actually have a calendar that matches his treatment schedule  Previously, in Winter 2022, pt has mohan told that his PSA is climbing markedly, but not what plan has been advised for this  Pt arrives without his son to clinic today, and -- as per his usual -- he does not the names, amounts, indications, nor uses of his meds  He does request refills, but has trouble reading his own notes  pt's son Nora Woo is still living in home, but cannot provide enough cares to pt  Nora Woo is curerntly working full time to support the household    Family would like to obtain waiver services to denise income to son for support he is already providing  Pt notes memory challenges complicating his medication usage, and with ADLs  He has trouble standing to dress, shower, shave, brush teeth  Chores about the house are nearly impossible because he cannot bend over, and he can't stand for more than about 3min at a time  This complicates his ability to cook for self as well  He does not wish to leave home, but would appreciate aids in the home to help clean  Has a [employer] to get some of the surfaces / floors in home  Adam Johnston has a fixed income, and has little disposible income after living expenses  We have received a Nursing Home Eligibility form, which I will fill out today, having now better understood what the pt's current needs and situation are  In office in Fall 2022, the pt cannot say where his medicines come from, nor exactly how they arrive at his home  He knows that his son sometimes picks up certain meds direct from pharmacy  Pt states he has an infusion appointment today, but he doesn't know why, and hypothesizes this is in response to an episode of chest pain he had two or three days ago  In a separate phone call to pt's son, we confirm that:   - Pt is not leaving home, and pt has significant ambulatory challenges about the home    - Pt's meds are set up by the son, and picked up in person when he has the chance  - Some meds might be delivered by [facility], but son has not coordinated this  - Son is not aware of any chest pain reported by the patient; he remembers that the pt's device was interrogated some months ago, in the Spring of 2022, and not since that time    - Son is driving pt to all appts, and still seeking waiver services  [facility] has some rates at $19+/hr, which would be preferable income over Network quoted rates of $12/hr  Diarreha controlled with Lomotil, only occurs on weeks that he has chemo      He states his pain is controlled with 'that medication that starts with an O', though he cannot recall the name of this med specifically  That all being said, he is consistent about pill usage, and has not run out early  He is able to read the instructions on bottles  Per Dr Jayson Quinn, the pt is not a candidate for traditional cytotoxic therapy, but he may continue on supportive therapies and hormone blockade  That being said, Dr Gonzalo Padilla last sentence in his plan from 8/17: "He is not a candidate for chemotherapy at this point as I do not think he will tolerate this well  If needed we can readdress this if his PSA continues to go up "    Pt has not seen PCP in quite some time, and has no desire to return unless very strongly indicated  Getting Meals on Wheels, but having trouble both paying for additional meals, and standing long enough to do dishes and prep food  He inquires today about food stamps, as she has spent down over half his life savings in order to pay for cares and living expenses  From our first visit on 3/1:    "Pt has demonstrated -- over a year ago now -- progression of his illness (rising PSA and decreasing functional status) despite aggressive therapies and cytotoxic treatments  His side effects vs decompensation in his overall illness was so severe he was admitted to [address] [place] [address] in march 2021  Since that time, he has recovered some function with careful management of his cardiac disease, and less aggressive, less toxic cancer cares  At this time, his treatment plan involves Xofigo for bone-avid disease, +/- Lupron  We note that the patient has demonstrated some disease progression on other hormonal blockers, but that this plan of care is being offered, if not rec'd, by Dr Jayson Quinn  Pt presents to our office with suboptimally controlled pain, ongoing wt loss, poor appetite, and general malaise    His concerns are more related to his back and R foot pain; a Podiatrist today advised him that his unilateral neuropathic symptoms 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        Today, he can't quite recall his medications, he just insists that he needs 'something stronger than tynlenol'  When we pointed out that he has used tramadol and oxyIR recently, he also stated that these medications were not helpful  Pt endorses support from his son, who will drive pt to appts and on other errands  Pt can't name his meds, but has some vague understanding of the indications for his meds "    Past medical, surgical, social, and family histories are reviewed and pertinent updates are made  Review of Systems   Unable to perform ROS: dementia         Vital Signs    /64   Pulse 86   Temp (!) 97 3 °F (36 3 °C)   Wt 95 9 kg (211 lb 6 7 oz)   BMI 33 11 kg/m²     Physical Exam and Objective Data  Physical Exam  Constitutional:       General: He is not in acute distress  Appearance: He is ill-appearing  He is not toxic-appearing or diaphoretic  Comments: Frail   HENT:      Head: Normocephalic and atraumatic  Right Ear: External ear normal       Left Ear: External ear normal    Eyes:      General:         Right eye: No discharge  Left eye: No discharge  Conjunctiva/sclera: Conjunctivae normal       Pupils: Pupils are equal, round, and reactive to light  Neck:      Trachea: No tracheal deviation  Cardiovascular:      Rate and Rhythm: Regular rhythm  Tachycardia present  Pulmonary:      Effort: Pulmonary effort is normal  No respiratory distress  Breath sounds: No stridor  Abdominal:      General: There is no distension  Palpations: Abdomen is soft  Comments: scaphoid   Skin:     General: Skin is warm and dry  Coloration: Skin is pale  Findings: No erythema or rash  Neurological:      General: No focal deficit present  Mental Status: He is alert  He is disoriented  Cranial Nerves: No cranial nerve deficit         Radiology and Laboratory:  I personally reviewed and interpreted the following results: none new    25+ minutes was spent face to face with Royce Rene  with greater than 50% of the time spent in counseling or coordination of care including: chart review; symptom pursuit; supportive listening; anticipatory guidance       All of the patient's or agent's questions were answered during this discussion

## 2023-03-02 NOTE — Clinical Note
Pt missed PET scan d/t acute illness  Needs a great deal of support in coordinating cares  Will order home nursing for worsening cognitive and ambulatory function

## 2023-03-02 NOTE — Clinical Note
Ordering home health to support the pt with increasing difficulties with vision, hearing, ambulation, and falls risk in the home

## 2023-03-03 ENCOUNTER — HOME CARE VISIT (OUTPATIENT)
Dept: HOME HEALTH SERVICES | Facility: HOME HEALTHCARE | Age: 78
End: 2023-03-03

## 2023-03-04 ENCOUNTER — HOME CARE VISIT (OUTPATIENT)
Dept: HOME HEALTH SERVICES | Facility: HOME HEALTHCARE | Age: 78
End: 2023-03-04

## 2023-03-05 ENCOUNTER — HOME CARE VISIT (OUTPATIENT)
Dept: HOME HEALTH SERVICES | Facility: HOME HEALTHCARE | Age: 78
End: 2023-03-05

## 2023-03-05 VITALS
HEART RATE: 96 BPM | BODY MASS INDEX: 31.07 KG/M2 | SYSTOLIC BLOOD PRESSURE: 118 MMHG | OXYGEN SATURATION: 100 % | DIASTOLIC BLOOD PRESSURE: 75 MMHG | TEMPERATURE: 97.3 F | WEIGHT: 205 LBS | RESPIRATION RATE: 18 BRPM | HEIGHT: 68 IN

## 2023-03-06 ENCOUNTER — HOME CARE VISIT (OUTPATIENT)
Dept: HOME HEALTH SERVICES | Facility: HOME HEALTHCARE | Age: 78
End: 2023-03-06

## 2023-03-06 VITALS — OXYGEN SATURATION: 98 % | HEART RATE: 70 BPM | SYSTOLIC BLOOD PRESSURE: 120 MMHG | DIASTOLIC BLOOD PRESSURE: 70 MMHG

## 2023-03-06 NOTE — CASE COMMUNICATION
[facility] has admitted your patient to [address] [place] [address] service with the following disciplines:      SN, OT, PT, MSW    This report is informational only, no responses is needed  Primary focus of home health care: musculoskeletal   Patient stated goals of care: be able to manage medications independently, gain strength to be able to perform ADLs safely/independently  Anticipated visit pattern and next visit date: 3W3, 2W3, 1W3, next v isit 3/8/23  See medication list - meds in home differ from AVS:    Meds Pt takes differently: per [facility] acetaminophen (TYLENOL) 325 mg tablet; pt takes acetaminophen extra strength 500mg 2 tabes per day as needed, but stated he has not taken any recently  Per MAR Ibuprofen (MOTRIN) 200 mg tablet, pt has Ibuprofen 150mg tab at home, but states he has not taken any in over a month  Meds not at home/pt not taking, but are on MAR: Empa gliflozin (JARDIANCE) 10 MG TABS tablet, lidocaine-prilocaine (EMLA) cream, ondansetron (ZOFRAN) 4 mg tablet, leuprolide (Eligard) 22 5 mg     Med pt not taking but has at home: per [facility] enzalutamide (Xtandi) 40 mg capsule  Significant clinical findings: pt is missing important medications listed above that are on MAR, but pt is not taking them  Potential barriers to goal achievement: limited caregiver support, pt resides on second  floor with chair lift not working  Thank you for allowing us to participate in the care of your patient  Bella Núñez RN

## 2023-03-07 ENCOUNTER — PATIENT OUTREACH (OUTPATIENT)
Dept: CASE MANAGEMENT | Facility: HOSPITAL | Age: 78
End: 2023-03-07

## 2023-03-07 ENCOUNTER — HOME CARE VISIT (OUTPATIENT)
Dept: HOME HEALTH SERVICES | Facility: HOME HEALTHCARE | Age: 78
End: 2023-03-07

## 2023-03-07 NOTE — PROGRESS NOTES
MSW returned phone call to [employer] worker  She is currently visiting patient  Patient's [employer] chair is being repaired  Sofi Mediate states patient has paperwork to apply for waiver services  Patient needs 2022 Taxes and bank statements to add to waiver application  Patient's aging worker is Tawny Isabel [phone]  MSW will follow up with patient on the status of his bank statements and 2022 taxes

## 2023-03-08 ENCOUNTER — HOME CARE VISIT (OUTPATIENT)
Dept: HOME HEALTH SERVICES | Facility: HOME HEALTHCARE | Age: 78
End: 2023-03-08

## 2023-03-08 VITALS
TEMPERATURE: 97.2 F | OXYGEN SATURATION: 97 % | DIASTOLIC BLOOD PRESSURE: 68 MMHG | RESPIRATION RATE: 18 BRPM | HEART RATE: 87 BPM | SYSTOLIC BLOOD PRESSURE: 124 MMHG | WEIGHT: 204 LBS | BODY MASS INDEX: 31.48 KG/M2

## 2023-03-09 ENCOUNTER — HOME CARE VISIT (OUTPATIENT)
Dept: HOME HEALTH SERVICES | Facility: HOME HEALTHCARE | Age: 78
End: 2023-03-09

## 2023-03-09 VITALS — SYSTOLIC BLOOD PRESSURE: 130 MMHG | HEART RATE: 84 BPM | OXYGEN SATURATION: 97 % | DIASTOLIC BLOOD PRESSURE: 70 MMHG

## 2023-03-10 ENCOUNTER — HOME CARE VISIT (OUTPATIENT)
Dept: HOME HEALTH SERVICES | Facility: HOME HEALTHCARE | Age: 78
End: 2023-03-10

## 2023-03-10 VITALS
SYSTOLIC BLOOD PRESSURE: 90 MMHG | BODY MASS INDEX: 31.48 KG/M2 | OXYGEN SATURATION: 96 % | TEMPERATURE: 97.1 F | WEIGHT: 204 LBS | DIASTOLIC BLOOD PRESSURE: 70 MMHG | HEART RATE: 76 BPM | RESPIRATION RATE: 18 BRPM

## 2023-03-10 DIAGNOSIS — I50.22 CHRONIC HFREF (HEART FAILURE WITH REDUCED EJECTION FRACTION) (HCC): ICD-10-CM

## 2023-03-13 ENCOUNTER — HOME CARE VISIT (OUTPATIENT)
Dept: HOME HEALTH SERVICES | Facility: HOME HEALTHCARE | Age: 78
End: 2023-03-13

## 2023-03-13 VITALS
BODY MASS INDEX: 31.17 KG/M2 | RESPIRATION RATE: 18 BRPM | WEIGHT: 202 LBS | TEMPERATURE: 98.2 F | OXYGEN SATURATION: 100 % | DIASTOLIC BLOOD PRESSURE: 62 MMHG | SYSTOLIC BLOOD PRESSURE: 120 MMHG | HEART RATE: 74 BPM

## 2023-03-13 DIAGNOSIS — T78.40XD ALLERGY, SUBSEQUENT ENCOUNTER: ICD-10-CM

## 2023-03-13 DIAGNOSIS — G89.3 CANCER RELATED PAIN: ICD-10-CM

## 2023-03-13 DIAGNOSIS — C61 PROSTATE CANCER (HCC): ICD-10-CM

## 2023-03-13 DIAGNOSIS — R26.2 AMBULATORY DYSFUNCTION: Chronic | ICD-10-CM

## 2023-03-13 DIAGNOSIS — G89.29 CHRONIC MIDLINE LOW BACK PAIN: ICD-10-CM

## 2023-03-13 DIAGNOSIS — M54.50 CHRONIC MIDLINE LOW BACK PAIN: ICD-10-CM

## 2023-03-13 DIAGNOSIS — I73.9 PAD (PERIPHERAL ARTERY DISEASE) (HCC): ICD-10-CM

## 2023-03-13 DIAGNOSIS — I50.22 CHRONIC HFREF (HEART FAILURE WITH REDUCED EJECTION FRACTION) (HCC): ICD-10-CM

## 2023-03-13 DIAGNOSIS — Z51.5 PALLIATIVE CARE PATIENT: ICD-10-CM

## 2023-03-13 RX ORDER — CETIRIZINE HYDROCHLORIDE 5 MG/1
5 TABLET ORAL DAILY
Qty: 90 TABLET | Refills: 3 | Status: SHIPPED | OUTPATIENT
Start: 2023-03-13

## 2023-03-13 RX ORDER — METOPROLOL SUCCINATE 25 MG/1
TABLET, EXTENDED RELEASE ORAL
Qty: 45 TABLET | Refills: 2 | Status: SHIPPED | OUTPATIENT
Start: 2023-03-13

## 2023-03-13 RX ORDER — METHOCARBAMOL 750 MG/1
750 TABLET, FILM COATED ORAL 3 TIMES DAILY PRN
Qty: 90 TABLET | Refills: 0 | Status: SHIPPED | OUTPATIENT
Start: 2023-03-13 | End: 2023-03-23

## 2023-03-13 RX ORDER — LOSARTAN POTASSIUM 25 MG/1
12.5 TABLET ORAL DAILY
Qty: 45 TABLET | Refills: 1 | Status: SHIPPED | OUTPATIENT
Start: 2023-03-13

## 2023-03-13 RX ORDER — FUROSEMIDE 20 MG/1
20 TABLET ORAL DAILY
Qty: 90 TABLET | Refills: 1 | Status: SHIPPED | OUTPATIENT
Start: 2023-03-13 | End: 2023-03-15 | Stop reason: ALTCHOICE

## 2023-03-13 RX ORDER — OXYCODONE HYDROCHLORIDE 10 MG/1
10 TABLET ORAL 4 TIMES DAILY PRN
Qty: 120 TABLET | Refills: 0 | OUTPATIENT
Start: 2023-03-13

## 2023-03-13 RX ORDER — GABAPENTIN 300 MG/1
600 CAPSULE ORAL 2 TIMES DAILY
Qty: 120 CAPSULE | Refills: 0 | OUTPATIENT
Start: 2023-03-13

## 2023-03-13 NOTE — TELEPHONE ENCOUNTER
Patient's visiting nurse Karol called office, asking for provider to send oxycodone 10 mg and gabapentin 300 mg, robaxin 300 mg, zyrtec 5 mg tablet and any other prescroption provider by [facility] to [address] and to state for bubble pill pack  Any question contact [facility] [phone]      PATIENT ID PRESCRIPTION # FILLED WRITTEN DRUG LABEL QTY DAYS STRENGTH MME** PRESCRIBER PHARMACY PAYMENT REFILL #/AUTH STATE DETAIL   1 [id number] 02/15/2023 02/15/2023 oxyCODONE HCL (Tablet) 120 0 30 10 MG 60 0 [facility]  Medicare 0 / 0 [place]    1 [id number] 01/06/2023 12/20/2022 oxyCODONE HCL (Tablet) 120 0 30 10 MG 60 0 [facility]  Medicare 0 / 0 [place]    1 [id number] 12/08/2022 11/08/2022 oxyCODONE HCL (Tablet) 90 0 30 10 MG 45 0 [facility]  Medicare 0 / 0 [place]    1 [id number] 11/08/2022 11/08/2022 oxyCODONE HCL (Tablet) 90 0 30 10 MG 45 0 [facility]  Medicare 0 / 0 [place]    1 [id number] 10/08/2022 09/06/2022 oxyCODONE HCL (Tablet) 90 0 30 10 MG 45 0 [facility]  Medicare 0 / 0 [place]    1 [id number] 09/09/2022 09/06/2022 oxyCODONE HCL (Tablet) 90 0 30 10 MG 45 0 [facility]   Medicare 0 / 0 [place]

## 2023-03-13 NOTE — PROGRESS NOTES
Advanced Heart Failure Outpatient Progress Note Lan Rene 68 y o  male MRN: [id number]    Encounter: [id number]      Assessment/Plan:    Patient Active Problem List    Diagnosis Date Noted   • PAD (peripheral artery disease) ([place] [address] ) 07/19/2021   • Lower extremity pain 07/11/2021   • Urinary retention 07/11/2021   • CAD (coronary artery disease) 07/10/2021   • Hypotension 07/10/2021   • Ischemic leg pain 07/07/2021   • Prostate cancer ([place] [address] ) 07/07/2021   • Chronic midline low back pain 07/01/2021   • Ischemic cardiomyopathy 07/01/2021   • Chronic combined systolic and diastolic CHF (congestive heart failure) ([place] [address] ) 06/22/2021   • S/P AVR 06/22/2021   • Anemia 06/22/2021   • Functional diarrhea 11/29/2022   • Ambulatory dysfunction 08/08/2022   • Peripheral neuropathy 08/08/2022   • Financial problems 08/08/2022   • Cancer-related pain 05/10/2022   • Palliative care patient 05/10/2022   • Malignant neoplasm metastatic to bone ([place] [address] ) 02/28/2022   • Embolism and 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 ([place] [address] ) 02/28/2022   • Depression, recurrent ([place] [address] ) 02/28/2022   • Weight loss 02/28/2022   • Muscular deconditioning 02/28/2022   • Port-A-Cath in place 10/05/2021          # Chronic HFrEF, NYHA III; ACC/AHA Stage C  Etiology: ischemic +/- progression of valvular disease +/- chemotherapy (on abiraterone and leuprolide with unclear safety profile)    Euvolemic, warm and and well perfused on exam    Diagnostic:    TTE 06/14/2021 (at [facility] pre- and post-TAVR, per report): LVEF 20%  LVIDd 5 4 cm  "entire apex, mid and apical anterior septum, mid and apical inferior septum, mid and apical inferior wall, mid inferolateral wall, and mid lateral wall are akinetic " Trace MR and TR  Pre-TAVR: LVOT VTI 12 0 cm  JOSE 0 47 cm^2  Post: bioprosthetic AV valve present; JOSE 2 41 cm^2                  TTE 06/23/2021:   LVEF 20%  LVIDd 5 9 cm   Grade 2 DD  aneurysmal LV mid to apex "dyskinesis and aneurysmal deformity of the apical anterior, mid anteroseptal, apical inferior, apical septal, and apical wall(s) "   Normal RV size and RVSF  Mild MR  AV bioprosthesis with normal leaflet separation  Trace TR      TTE 11/17/21:  LVEF: 25%  LVIDd: 6 1cm  RV: normal size and systolic function  MR: mild to moderate   Other: Akinesis and aneurysmal deformity of the mid septum and apex  Grade 2 diastology  Ascending aorta 4cm  TAVR bioprosthetic valve, normally functioning  Mean gradient 3mmHg     TTE 12/15/22:  LVEF 30-35%  LVIDD , moderate concentric hypertrophy, regional wall motion abnormalities  Grade 1 diastology  Well seated TAVR valve, mild paravalvular AI, mean gradient 7  Estimated RVSP 31mmHg  Normal RV systolic function    Neurohormonal Blockade:  --Beta-Blocker: Metoprolol succinate 25 mg daily - decreased to 12 5mg daily  --ACEi, ARB or ARNi: Entresto 24/26 mg BID - switched to losartan 25mg daily due to low blood pressure - decreased to 12 5mg daily due to lightheadedness    (or SVR reduction)  --Aldosterone Receptor Blocker: Spironolactone 12 5 mg daily - off due to low BP  --SGLT2-I: not taking Jardiance  --Diuretic: Lasix 40 mg daily - decreased to 20mg daily due to lightheadedness     Sudden Cardiac Death Risk Reduction:  --ICD: s/p SJM BIV ICD 12/23/21  Interrogation 1/12/23: AP 58% BVP 98%  2VT-NS episodes  AT/AF burden <1%  CoreVue normal  Interrogation 10/12/22: AP 63% BVP 98%  No significant high rate episodes  CoreVue within normal   Interrogation 7/13/2022:  A paced 59% bi V paced 98%  No significant high rate episodes  Core view within normal       Electrical Resynchronization:  -- LBBB QRSd 158 msec preBIV; QRSd 148msec post BIV     Advanced Therapies (If appropriate): --We will continue to monitor     # Ventricular tachycardia  Detected on device interrogation 4/29/2022 status post ATP  No recurrence  Maintained on beta blocker      # Prostate cancer with bony metastases  [facility]   Taking SQ leuprolide (Eligard) and abiraterone (Zytiga) - safety of abiraterone in patients with LVEF <50% not established per FDA  Humbird-analysis of drug noted increased incidence and relative risk of CV toxicity      # Coronary artery disease  Without active chest pain  S/pstenting to circumflex and RCA in 2001  [facility] 06/09/2021 (per documentation):"diffuse moderate prox and mid disease/ISR, severe OM lesion and total mid LAD "  Rx: aspirin, plavix, statin     # Aortic stenosis  S/p TAVR (Evolut Pro+ 29mm) on 06/14/2021 at [facility]  Normally functioning on last echo 11/2021    # RLE ischemia  LEAD with >75% stenosis of the CFA/SFA  s/p right femoral endarterectomy 7/9/21  Continues on ASA, Plavix, and statin  Follows with Dr Susy Allan      # Hyperlipidemia     # History of atrial tachycardia: s/p ablation at OSH in 2018  # Benign prostatic hyperplasia      TODAY'S PLAN:  Start Jardiance 10mg daily   Stop furosemide with initiation of Jardiance  BMP in 1 week after starting Jardiance  2g sodium diet  2L fluid restriction  Daily weights  Physical activities/exercise as tolerated  Follow-up in 4 months or sooner as needed      HPI:   Masoud Mojica is a 27-year-old man with a PMH as aboe who presents for hospital follow-up      Presented to [facility] on 06/03/2021 with chest pressure before chemotherapy treatment for prostate cancer  Sent for testing and noted to be in acute CHF exacerbation and was diuresed  Was then transferred to [facility] in [facility]  TTE showed "severely reduced LVEF with LV apical aneurysm and septal/AW AK, RV normal, Vmax at 3 5 (however, it was read as moderate AS and preserved EF in 2019 so likely this is progression of AS in addition to interim AWMI) "  [facility] also done which revealed "diffuse moderate prox and mid disease/ISR, severe OM lesion and total mid LAD "     Transferred to [facility] on 06/13 for TAVR  TAVR completed on 06/14, and patient was discharged home on 06/15/2021  Started on metoprolol succinate this admission (tartrate discontinued)       Admitted to [facility] from 06/22 to 06/25/2021 after presenting with worsening SOB and LE edema for 3 days  WAS without discharge medications from [facility] admission for about 1 week (including Lasix)  NT-proBNP 5157  Cardiology was consulted, TTE was completed, and IV diuresis was started  Reduced LVEF was confirmed and aneurysmal wall motion of apical segments  Transitioned to PO diuretics on day of discharge  Started on Entresto and fit with LifeVest  Lost 10 lbs this admission       07/06/2021: Patient presents for hospital follow-up  Primary complaint today is of dizziness and right LE pain/numbeness and right foot wound  Reports decreased appetite and increased frustration with attempting to adhere to low sodium diet  Did receive "Living With Heart Failure" booklet during admission  Prior to admissions, often would eat canned pastas for his meals  Considering getting Meals of Wheels  Unclear if patient has been taking both tartrate and succinate since discharge  Not wearing LifeVest; dislikes wearing it and left device at home today  Has not been completing daily weights; does not have scale at home  Patient organizes his own meds; VNA in home 2 times weekly  7/16/2021: Patient is here for follow-up  Patient hospitalized 6/17/20July 7-13 due to right lower extremity ischemia  Underwent right femoral endarterectomy on June 9  Furosemide Entresto held prior to surgery due to low blood pressure  Eventually discharged on low-dose losartan 12 5 mg daily due to soft blood pressure  Furosemide 40 mg daily resumed  Patient reports right lower extremity pain and difficulty bearing weight on the right leg  Otherwise denies shortness of breath, chest pain or lightheadedness  9/15/21: Here for follow up  Overall doing good  Mainly complaining of residual numbness/tingling on right leg   No shortness of breath, chest pain, palpitations or lightheadedness  1/25/22: Here for follow up  S/p BIV ICD placement 12/23/21  Reports lightheadedness "all the time"  No feeling faint  Spinning sensation when laying down  Mainly limited by leg pain, not doing much walking  Denied shortness of breath walking from the parking lot to the office and from the waiting area to exam room  4/26/22: Here for follow up  Decreased furosemide to 20mg daily on last visit  4/18/22 Na 141 K 3 9 creatinine 0 82  One episode of vertigo about a week ago  Still with intermittent episodes of lightheadedness  Doing PT twice a week  Still having lower extremity nerve pains    7/27/22: Here for follow up  Mainly limited by foot pain  Increased calorie intake, eating more ice cream     11/22/22: Here for follow up  Getting new treatment for prostate cancer (Pluvitico)  Getting right buttock pain and right shoulder pain, taking advil 400mg 3x/day in between tylenol  Occasional lightheadedness  No shortness of breath on current level of exertion, limited walking due to foot pain but able to go up a flight of stairs slowly without shortness of breath, feels tired  Interval History:   3/15/23: Here for follow up  2/20/23 Na 142 K 3 7 creatinine 0 79  Reports doing ok from a cardiac standpoint  No shortness of breath or chest pain on current level of exertion  Activities depends on the day  Having good and bad days  Intake also variable  Per VNS, recently with low Bps likely due to decreased oral intake, weight stable  Normotensive on office visit today  Not taking Jardiance, not sure why  "takes what is given to him from pharmacy  Review of Systems   Constitutional: Negative for chills and fever  HENT: Negative for ear pain and sore throat  Eyes: Negative for pain and visual disturbance  Respiratory: Negative for cough, chest tightness and shortness of breath  Cardiovascular: Negative for chest pain, palpitations and leg swelling  Gastrointestinal: Negative for abdominal distention, abdominal pain, nausea and vomiting  Genitourinary: Negative for dysuria and hematuria  Musculoskeletal: Negative for arthralgias and back pain  Skin: Negative for color change and rash  Neurological: Negative for dizziness, seizures, syncope and light-headedness  All other systems reviewed and are negative  Past Medical History:   Diagnosis Date   • Cancer ([place] [address] ) 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   • Coronary artery disease 2001    S/p stenting to circumflex and RCA   • HFrEF (heart failure with reduced ejection fraction) ([place] [address] ) 06/14/2021   • High cholesterol    • Prostate cancer ([facility])          No Known Allergies        Current Outpatient Medications:   •  abiraterone (ZYTIGA) 250 mg tablet, Take 4 tablets (1,000 mg total) by mouth once daily, Disp: 120 tablet, Rfl: 10  •  acetaminophen (TYLENOL) 325 mg tablet, Take 3 tablets (975 mg total) by mouth every 8 (eight) hours (Patient taking differently: Take 650 mg by mouth 3 (three) times a day), Disp: 90 tablet, Rfl: 0  •  aspirin 81 mg chewable tablet, Chew 1 tablet (81 mg total) daily, Disp: 90 tablet, Rfl: 0  •  atorvastatin (LIPITOR) 80 mg tablet, Take 1 tablet (80 mg total) by mouth daily with dinner, Disp: 90 tablet, Rfl: 1  •  cetirizine (ZyrTEC) 5 MG tablet, Take 1 tablet (5 mg total) by mouth daily, Disp: 90 tablet, Rfl: 3  •  clopidogrel (PLAVIX) 75 mg tablet, Take 1 tablet (75 mg total) by mouth daily, Disp: 90 tablet, Rfl: 0  •  Empagliflozin (JARDIANCE) 10 MG TABS tablet, Take 1 tablet (10 mg total) by mouth every morning, Disp: 30 tablet, Rfl: 11  •  famotidine (PEPCID) 20 mg tablet, Take 1 tablet (20 mg total) by mouth daily, Disp: 30 tablet, Rfl: 2  •  ferrous sulfate 325 (65 Fe) mg tablet, Take 1 tablet (325 mg total) by mouth 2 (two) times a day with meals, Disp: 180 tablet, Rfl: 0  •  gabapentin (NEURONTIN) 300 mg capsule, Take 2 capsules (600 mg total) by mouth 2 (two) times a day To reduce nerve pain in feet  Do not start before 2023 , Disp: 120 capsule, Rfl: 0  •  ibuprofen (MOTRIN) 200 mg tablet, Take 200 mg by mouth every 4 (four) hours as needed for mild pain, Disp: , Rfl:   •  leuprolide (Eligard) 22 5 mg, , Disp: , Rfl:   •  lidocaine-prilocaine (EMLA) cream, Apply topically as needed (nerve pain) In Right leg, Disp: 30 g, Rfl: 1  •  loperamide (IMODIUM) 2 mg capsule, Take 1 capsule (2 mg total) by mouth 4 (four) times a day as needed for diarrhea, Disp: 30 capsule, Rfl: 2  •  losartan (COZAAR) 25 mg tablet, Take 0 5 tablets (12 5 mg total) by mouth daily, Disp: 45 tablet, Rfl: 1  •  methocarbamol (ROBAXIN) 750 mg tablet, Take 1 tablet (750 mg total) by mouth 3 (three) times a day as needed for muscle spasms, Disp: 90 tablet, Rfl: 0  •  metoprolol succinate (TOPROL-XL) 25 mg 24 hr tablet, 1/2 tablet daily, Disp: 45 tablet, Rfl: 2  •  ondansetron (ZOFRAN) 4 mg tablet, Take 1 tablet (4 mg total) by mouth every 8 (eight) hours as needed for nausea or vomiting, Disp: 30 tablet, Rfl: 3  •  oxyCODONE (ROXICODONE) 10 MG TABS, Take 1 tablet (10 mg total) by mouth 4 (four) times a day as needed (cancer pain) Max Daily Amount: 40 mg Do not start before 2023 , Disp: 120 tablet, Rfl: 0  •  prochlorperazine (COMPAZINE) 10 mg tablet, Take 1 tablet (10 mg total) by mouth every 6 (six) hours as needed for nausea or vomiting, Disp: 45 tablet, Rfl: 3  •  enzalutamide (Xtandi) 40 mg capsule, Take 4 capsules (160 mg total) by mouth in the morning  (Patient not taking: No sig reported), Disp: 120 capsule, Rfl: 10    Social History     Socioeconomic History   • Marital status:       Spouse name: Not on file   • Number of children: 3   • Years of education: Not on file   • Highest education level: Not on file   Occupational History   • Not on file   Tobacco Use   • Smoking status: Former     Years:      Types: Cigarettes     Quit date: 2002     Years since quittin 7   • Smokeless tobacco: Never   Vaping Use   • Vaping Use: Never used   Substance and Sexual Activity   • Alcohol use: Not Currently   • Drug use: Not on file   • Sexual activity: Not on file   Other Topics Concern   • Not on file   Social History Narrative   • Not on file     Social Determinants of Health     Financial Resource Strain: Not on file   Food Insecurity: Not on file   Transportation Needs: Not on file   Physical Activity: Not on file   Stress: Not on file   Social Connections: Not on file   Intimate Partner Violence: Not on file   Housing Stability: Not on file       No family history on file  Physical Exam:    Vitals:   Vitals:    03/15/23 0836   BP: 120/78   Pulse: 88   SpO2: 96%       Physical Exam  Constitutional:       General: He is not in acute distress  Appearance: Normal appearance  HENT:      Head: Normocephalic and atraumatic  Mouth/Throat:      Mouth: Mucous membranes are moist    Eyes:      General: No scleral icterus  Extraocular Movements: Extraocular movements intact  Cardiovascular:      Rate and Rhythm: Normal rate and regular rhythm  Heart sounds: S1 normal and S2 normal  No murmur heard  No friction rub  No gallop  Comments: Nonelevated JVP  Pulmonary:      Breath sounds: Normal breath sounds  Abdominal:      General: There is no distension  Palpations: Abdomen is soft  Tenderness: There is no abdominal tenderness  There is no guarding or rebound  Musculoskeletal:         General: Normal range of motion  Cervical back: Neck supple  Right lower leg: No edema  Left lower leg: No edema  Skin:     General: Skin is warm and dry  Capillary Refill: Capillary refill takes less than 2 seconds  Neurological:      General: No focal deficit present  Mental Status: He is alert and oriented to person, place, and time     Psychiatric:         Mood and Affect: Mood normal          Labs & Results:    Lab Results   Component Value Date    SODIUM 142 02/20/2023    K 3 7 02/20/2023     (H) 02/20/2023    CO2 25 02/20/2023    BUN 12 02/20/2023    CREATININE 0 79 02/20/2023    GLUC 102 02/20/2023    CALCIUM 8 4 02/20/2023     Lab Results   Component Value Date    WBC 4 42 02/20/2023    HGB 10 7 (L) 02/20/2023    HCT 34 3 (L) 02/20/2023    MCV 93 02/20/2023     02/20/2023     Lab Results   Component Value Date    NTBNP 4,279 (H) 02/14/2022      Lab Results   Component Value Date    CHOLESTEROL 146 09/30/2021     Lab Results   Component Value Date    HDL 35 (L) 09/30/2021     Lab Results   Component Value Date    TRIG 249 (H) 09/30/2021     No results found for: Jaime    EKG personally reviewed by Jonnie Holland  Counseling / Coordination of Care  Time spent today 25 minutes  Greater than 50% of total time was spent with the patient and / or family counseling and / or coordination of care  We went over current diagnosis, most recent studies and any changes in treatment  Thank you for the opportunity to participate in the care of this patient      Elsa Chan MD  ADVANCED HEART FAILURE AND MECHANICAL CIRCULATORY SUPPORT  [facility]

## 2023-03-14 ENCOUNTER — HOME CARE VISIT (OUTPATIENT)
Dept: HOME HEALTH SERVICES | Facility: HOME HEALTHCARE | Age: 78
End: 2023-03-14

## 2023-03-14 VITALS — HEART RATE: 80 BPM | OXYGEN SATURATION: 97 %

## 2023-03-14 VITALS — OXYGEN SATURATION: 99 % | HEART RATE: 61 BPM

## 2023-03-14 DIAGNOSIS — K21.9 GASTROESOPHAGEAL REFLUX DISEASE WITHOUT ESOPHAGITIS: ICD-10-CM

## 2023-03-14 DIAGNOSIS — I42.9 CARDIOMYOPATHY, UNSPECIFIED TYPE (HCC): ICD-10-CM

## 2023-03-14 DIAGNOSIS — D64.9 ANEMIA, UNSPECIFIED TYPE: ICD-10-CM

## 2023-03-14 DIAGNOSIS — I25.10 CORONARY ARTERY DISEASE INVOLVING NATIVE HEART WITHOUT ANGINA PECTORIS, UNSPECIFIED VESSEL OR LESION TYPE: ICD-10-CM

## 2023-03-14 DIAGNOSIS — I99.8 ISCHEMIC LEG PAIN: ICD-10-CM

## 2023-03-14 DIAGNOSIS — R11.0 NAUSEA: ICD-10-CM

## 2023-03-14 DIAGNOSIS — M79.606 ISCHEMIC LEG PAIN: ICD-10-CM

## 2023-03-14 DIAGNOSIS — K59.1 FUNCTIONAL DIARRHEA: ICD-10-CM

## 2023-03-14 RX ORDER — FAMOTIDINE 20 MG/1
20 TABLET, FILM COATED ORAL DAILY
Qty: 30 TABLET | Refills: 2 | Status: SHIPPED | OUTPATIENT
Start: 2023-03-14 | End: 2023-06-12

## 2023-03-14 RX ORDER — LOPERAMIDE HYDROCHLORIDE 2 MG/1
2 CAPSULE ORAL 4 TIMES DAILY PRN
Qty: 30 CAPSULE | Refills: 2 | Status: SHIPPED | OUTPATIENT
Start: 2023-03-14

## 2023-03-14 RX ORDER — CLOPIDOGREL BISULFATE 75 MG/1
75 TABLET ORAL DAILY
Qty: 90 TABLET | Refills: 0 | Status: SHIPPED | OUTPATIENT
Start: 2023-03-14 | End: 2023-06-12

## 2023-03-14 RX ORDER — ONDANSETRON 4 MG/1
4 TABLET, FILM COATED ORAL EVERY 8 HOURS PRN
Qty: 30 TABLET | Refills: 3 | Status: SHIPPED | OUTPATIENT
Start: 2023-03-14

## 2023-03-14 RX ORDER — FERROUS SULFATE 325(65) MG
1 TABLET ORAL 2 TIMES DAILY WITH MEALS
Qty: 180 TABLET | Refills: 0 | Status: SHIPPED | OUTPATIENT
Start: 2023-03-14 | End: 2023-06-12

## 2023-03-14 RX ORDER — ATORVASTATIN CALCIUM 80 MG/1
80 TABLET, FILM COATED ORAL
Qty: 90 TABLET | Refills: 1 | Status: SHIPPED | OUTPATIENT
Start: 2023-03-14

## 2023-03-14 RX ORDER — ASPIRIN 81 MG/1
81 TABLET, CHEWABLE ORAL DAILY
Qty: 90 TABLET | Refills: 0 | Status: SHIPPED | OUTPATIENT
Start: 2023-03-14 | End: 2023-06-12

## 2023-03-15 ENCOUNTER — HOME CARE VISIT (OUTPATIENT)
Dept: HOME HEALTH SERVICES | Facility: HOME HEALTHCARE | Age: 78
End: 2023-03-15

## 2023-03-15 ENCOUNTER — OFFICE VISIT (OUTPATIENT)
Dept: CARDIOLOGY CLINIC | Facility: CLINIC | Age: 78
End: 2023-03-15

## 2023-03-15 VITALS
HEIGHT: 67 IN | BODY MASS INDEX: 32.02 KG/M2 | HEART RATE: 88 BPM | OXYGEN SATURATION: 96 % | SYSTOLIC BLOOD PRESSURE: 120 MMHG | DIASTOLIC BLOOD PRESSURE: 78 MMHG | WEIGHT: 204 LBS

## 2023-03-15 DIAGNOSIS — I50.22 CHRONIC HFREF (HEART FAILURE WITH REDUCED EJECTION FRACTION) (HCC): Primary | ICD-10-CM

## 2023-03-15 DIAGNOSIS — Z95.2 S/P TAVR (TRANSCATHETER AORTIC VALVE REPLACEMENT): ICD-10-CM

## 2023-03-15 DIAGNOSIS — Z95.810 PRESENCE OF AUTOMATIC CARDIOVERTER/DEFIBRILLATOR (AICD): ICD-10-CM

## 2023-03-15 DIAGNOSIS — I50.22 CHRONIC HFREF (HEART FAILURE WITH REDUCED EJECTION FRACTION) (HCC): ICD-10-CM

## 2023-03-15 DIAGNOSIS — I25.10 CORONARY ARTERY DISEASE INVOLVING NATIVE CORONARY ARTERY OF NATIVE HEART WITHOUT ANGINA PECTORIS: ICD-10-CM

## 2023-03-15 NOTE — PATIENT INSTRUCTIONS
Start Jardiance 10mg daily   Stop furosemide with initiation of Jardiance  BMP in 1 week after starting Jardiance  2g sodium diet  2L fluid restriction  Daily weights  Physical activities/exercise as tolerated

## 2023-03-16 ENCOUNTER — HOME CARE VISIT (OUTPATIENT)
Dept: HOME HEALTH SERVICES | Facility: HOME HEALTHCARE | Age: 78
End: 2023-03-16

## 2023-03-16 VITALS — DIASTOLIC BLOOD PRESSURE: 70 MMHG | OXYGEN SATURATION: 97 % | SYSTOLIC BLOOD PRESSURE: 125 MMHG | HEART RATE: 87 BPM

## 2023-03-17 ENCOUNTER — HOME CARE VISIT (OUTPATIENT)
Dept: HOME HEALTH SERVICES | Facility: HOME HEALTHCARE | Age: 78
End: 2023-03-17

## 2023-03-17 VITALS
SYSTOLIC BLOOD PRESSURE: 132 MMHG | WEIGHT: 204 LBS | BODY MASS INDEX: 31.95 KG/M2 | RESPIRATION RATE: 18 BRPM | DIASTOLIC BLOOD PRESSURE: 78 MMHG | HEART RATE: 76 BPM | TEMPERATURE: 98.7 F | OXYGEN SATURATION: 96 %

## 2023-03-20 ENCOUNTER — TELEPHONE (OUTPATIENT)
Dept: PALLIATIVE MEDICINE | Facility: CLINIC | Age: 78
End: 2023-03-20

## 2023-03-20 ENCOUNTER — PATIENT OUTREACH (OUTPATIENT)
Dept: CASE MANAGEMENT | Facility: HOSPITAL | Age: 78
End: 2023-03-20

## 2023-03-20 ENCOUNTER — HOME CARE VISIT (OUTPATIENT)
Dept: HOME HEALTH SERVICES | Facility: HOME HEALTHCARE | Age: 78
End: 2023-03-20

## 2023-03-20 VITALS
HEART RATE: 82 BPM | RESPIRATION RATE: 18 BRPM | BODY MASS INDEX: 32.11 KG/M2 | SYSTOLIC BLOOD PRESSURE: 130 MMHG | TEMPERATURE: 98.7 F | WEIGHT: 205 LBS | OXYGEN SATURATION: 95 % | DIASTOLIC BLOOD PRESSURE: 90 MMHG

## 2023-03-20 VITALS — OXYGEN SATURATION: 99 % | HEART RATE: 73 BPM

## 2023-03-20 DIAGNOSIS — I73.9 PAD (PERIPHERAL ARTERY DISEASE) (HCC): ICD-10-CM

## 2023-03-20 DIAGNOSIS — Z51.5 PALLIATIVE CARE PATIENT: ICD-10-CM

## 2023-03-20 DIAGNOSIS — C61 PROSTATE CANCER (HCC): ICD-10-CM

## 2023-03-20 DIAGNOSIS — G89.3 CANCER RELATED PAIN: ICD-10-CM

## 2023-03-20 NOTE — TELEPHONE ENCOUNTER
[facility] for Royce  Nurse states patient has enough oxycodone till tomorrow  Patient has a couple tablets left of gabapentin  Nurse stated to send refills to [facility]  Patient Id Prescription #  Filled Written Drug Label Qty Days Strength MME** Prescriber Pharmacy Payment REFILL #/Auth State Detail   1  [id number] 02/15/2023  02/15/2023 oxyCODONE HCL (Tablet)  120 0 30 10 MG 60 0 VICKIE GOLDBERG         According to [facility] scripts dated 3/13/23 were not picked up      Please resend scripts to [facility] for Oxycodone and gabapentin

## 2023-03-20 NOTE — PROGRESS NOTES
MSW phoned and spoke with patient  Patient states he is doing fine  Patient states [facility] have been working well for him  Patient states his bank statement came in the mail and that he is currently waiting for his taxes to be complete  Patient will need to provide both bank statements and 2022 taxes to complete waiver services  Patient states he will call his person who is completing his taxes to find out how much longer it will take  Patient states his nurse is currently visiting  MSW provided patient with this MSW's phone number for future resource needs  MSW will continue to follow and patient is agreeable

## 2023-03-21 ENCOUNTER — HOME CARE VISIT (OUTPATIENT)
Dept: HOME HEALTH SERVICES | Facility: HOME HEALTHCARE | Age: 78
End: 2023-03-21

## 2023-03-21 VITALS — OXYGEN SATURATION: 94 % | DIASTOLIC BLOOD PRESSURE: 70 MMHG | SYSTOLIC BLOOD PRESSURE: 125 MMHG | HEART RATE: 91 BPM

## 2023-03-21 RX ORDER — GABAPENTIN 300 MG/1
600 CAPSULE ORAL 2 TIMES DAILY
Qty: 120 CAPSULE | Refills: 0 | Status: SHIPPED | OUTPATIENT
Start: 2023-03-21

## 2023-03-21 RX ORDER — OXYCODONE HYDROCHLORIDE 10 MG/1
10 TABLET ORAL 4 TIMES DAILY PRN
Qty: 120 TABLET | Refills: 0 | Status: SHIPPED | OUTPATIENT
Start: 2023-03-21 | End: 2023-03-30 | Stop reason: SDUPTHER

## 2023-03-22 ENCOUNTER — HOME CARE VISIT (OUTPATIENT)
Dept: HOME HEALTH SERVICES | Facility: HOME HEALTHCARE | Age: 78
End: 2023-03-22

## 2023-03-22 VITALS
TEMPERATURE: 98 F | BODY MASS INDEX: 31.79 KG/M2 | HEART RATE: 70 BPM | OXYGEN SATURATION: 96 % | WEIGHT: 203 LBS | RESPIRATION RATE: 18 BRPM | DIASTOLIC BLOOD PRESSURE: 70 MMHG | SYSTOLIC BLOOD PRESSURE: 118 MMHG

## 2023-03-23 ENCOUNTER — TELEPHONE (OUTPATIENT)
Dept: PALLIATIVE MEDICINE | Facility: HOSPITAL | Age: 78
End: 2023-03-23

## 2023-03-23 DIAGNOSIS — I99.8 ISCHEMIC LEG PAIN: ICD-10-CM

## 2023-03-23 DIAGNOSIS — M79.606 ISCHEMIC LEG PAIN: ICD-10-CM

## 2023-03-23 RX ORDER — ACETAMINOPHEN 500 MG
1000 TABLET ORAL 3 TIMES DAILY PRN
Start: 2023-03-23

## 2023-03-23 NOTE — TELEPHONE ENCOUNTER
Working to reconcile meds to match pt's usage and blisterpacking  Appreciate notification by home health RN Fredi  Will D/C compazine, as this med has not been filled in a year  Ondansetron often preferred over other meds for chemo-induced / cancer-related  nausea/vomiting

## 2023-03-24 ENCOUNTER — HOME CARE VISIT (OUTPATIENT)
Dept: HOME HEALTH SERVICES | Facility: HOME HEALTHCARE | Age: 78
End: 2023-03-24

## 2023-03-24 VITALS — HEART RATE: 84 BPM | OXYGEN SATURATION: 99 %

## 2023-03-24 VITALS
DIASTOLIC BLOOD PRESSURE: 86 MMHG | SYSTOLIC BLOOD PRESSURE: 126 MMHG | WEIGHT: 201 LBS | TEMPERATURE: 97.8 F | OXYGEN SATURATION: 96 % | RESPIRATION RATE: 18 BRPM | HEART RATE: 77 BPM | BODY MASS INDEX: 31.48 KG/M2

## 2023-03-28 ENCOUNTER — LAB REQUISITION (OUTPATIENT)
Dept: LAB | Facility: HOSPITAL | Age: 78
End: 2023-03-28

## 2023-03-28 ENCOUNTER — OFFICE VISIT (OUTPATIENT)
Dept: PODIATRY | Facility: CLINIC | Age: 78
End: 2023-03-28

## 2023-03-28 ENCOUNTER — HOME CARE VISIT (OUTPATIENT)
Dept: HOME HEALTH SERVICES | Facility: HOME HEALTHCARE | Age: 78
End: 2023-03-28

## 2023-03-28 VITALS
SYSTOLIC BLOOD PRESSURE: 115 MMHG | WEIGHT: 201.2 LBS | BODY MASS INDEX: 31.58 KG/M2 | HEIGHT: 67 IN | HEART RATE: 86 BPM | OXYGEN SATURATION: 96 % | DIASTOLIC BLOOD PRESSURE: 76 MMHG

## 2023-03-28 VITALS
RESPIRATION RATE: 18 BRPM | OXYGEN SATURATION: 99 % | TEMPERATURE: 97.5 F | HEART RATE: 85 BPM | DIASTOLIC BLOOD PRESSURE: 70 MMHG | SYSTOLIC BLOOD PRESSURE: 120 MMHG

## 2023-03-28 DIAGNOSIS — L03.031 PARONYCHIA OF GREAT TOE OF RIGHT FOOT: ICD-10-CM

## 2023-03-28 DIAGNOSIS — I50.22 CHRONIC SYSTOLIC (CONGESTIVE) HEART FAILURE (HCC): ICD-10-CM

## 2023-03-28 DIAGNOSIS — B35.1 ONYCHOMYCOSIS: ICD-10-CM

## 2023-03-28 DIAGNOSIS — I73.9 PAD (PERIPHERAL ARTERY DISEASE) (HCC): Primary | ICD-10-CM

## 2023-03-28 LAB
ANION GAP SERPL CALCULATED.3IONS-SCNC: 8 MMOL/L (ref 4–13)
BUN SERPL-MCNC: 15 MG/DL (ref 5–25)
CALCIUM SERPL-MCNC: 8.7 MG/DL (ref 8.4–10.2)
CHLORIDE SERPL-SCNC: 107 MMOL/L (ref 96–108)
CO2 SERPL-SCNC: 24 MMOL/L (ref 21–32)
CREAT SERPL-MCNC: 0.89 MG/DL (ref 0.6–1.3)
GFR SERPL CREATININE-BSD FRML MDRD: 82 ML/MIN/1.73SQ M
GLUCOSE SERPL-MCNC: 97 MG/DL (ref 65–140)
POTASSIUM SERPL-SCNC: 3.7 MMOL/L (ref 3.5–5.3)
SODIUM SERPL-SCNC: 139 MMOL/L (ref 135–147)

## 2023-03-28 RX ORDER — CEPHALEXIN 500 MG/1
500 CAPSULE ORAL EVERY 8 HOURS SCHEDULED
Qty: 21 CAPSULE | Refills: 0 | Status: ON HOLD | OUTPATIENT
Start: 2023-03-28 | End: 2023-04-04

## 2023-03-29 ENCOUNTER — HOSPITAL ENCOUNTER (OUTPATIENT)
Dept: INFUSION CENTER | Facility: CLINIC | Age: 78
Discharge: HOME | End: 2023-03-29

## 2023-03-29 DIAGNOSIS — Z95.828 PORT-A-CATH IN PLACE: ICD-10-CM

## 2023-03-29 DIAGNOSIS — C61 PROSTATE CANCER (HCC): Primary | ICD-10-CM

## 2023-03-29 DIAGNOSIS — C79.51 MALIGNANT NEOPLASM METASTATIC TO BONE (HCC): ICD-10-CM

## 2023-03-29 LAB
ALBUMIN SERPL BCP-MCNC: 3.8 G/DL (ref 3.5–5)
ALP SERPL-CCNC: 152 U/L (ref 34–104)
ALT SERPL W P-5'-P-CCNC: 8 U/L (ref 7–52)
ANION GAP SERPL CALCULATED.3IONS-SCNC: 8 MMOL/L (ref 4–13)
AST SERPL W P-5'-P-CCNC: 14 U/L (ref 13–39)
BASOPHILS # BLD AUTO: 0.02 THOUSANDS/ÂΜL (ref 0–0.1)
BASOPHILS NFR BLD AUTO: 0 % (ref 0–1)
BILIRUB SERPL-MCNC: 0.81 MG/DL (ref 0.2–1)
BUN SERPL-MCNC: 13 MG/DL (ref 5–25)
CALCIUM SERPL-MCNC: 8.3 MG/DL (ref 8.4–10.2)
CHLORIDE SERPL-SCNC: 108 MMOL/L (ref 96–108)
CO2 SERPL-SCNC: 23 MMOL/L (ref 21–32)
CREAT SERPL-MCNC: 0.83 MG/DL (ref 0.6–1.3)
EOSINOPHIL # BLD AUTO: 0.11 THOUSAND/ÂΜL (ref 0–0.61)
EOSINOPHIL NFR BLD AUTO: 1 % (ref 0–6)
ERYTHROCYTE [DISTWIDTH] IN BLOOD BY AUTOMATED COUNT: 14.5 % (ref 11.6–15.1)
GFR SERPL CREATININE-BSD FRML MDRD: 84 ML/MIN/1.73SQ M
GLUCOSE SERPL-MCNC: 106 MG/DL (ref 65–140)
HCT VFR BLD AUTO: 33.4 % (ref 36.5–49.3)
HGB BLD-MCNC: 10.7 G/DL (ref 12–17)
IMM GRANULOCYTES # BLD AUTO: 0.02 THOUSAND/UL (ref 0–0.2)
IMM GRANULOCYTES NFR BLD AUTO: 0 % (ref 0–2)
LYMPHOCYTES # BLD AUTO: 1.49 THOUSANDS/ÂΜL (ref 0.6–4.47)
LYMPHOCYTES NFR BLD AUTO: 19 % (ref 14–44)
MCH RBC QN AUTO: 28.8 PG (ref 26.8–34.3)
MCHC RBC AUTO-ENTMCNC: 32 G/DL (ref 31.4–37.4)
MCV RBC AUTO: 90 FL (ref 82–98)
MONOCYTES # BLD AUTO: 0.74 THOUSAND/ÂΜL (ref 0.17–1.22)
MONOCYTES NFR BLD AUTO: 10 % (ref 4–12)
NEUTROPHILS # BLD AUTO: 5.35 THOUSANDS/ÂΜL (ref 1.85–7.62)
NEUTS SEG NFR BLD AUTO: 70 % (ref 43–75)
NRBC BLD AUTO-RTO: 0 /100 WBCS
PLATELET # BLD AUTO: 262 THOUSANDS/UL (ref 149–390)
PMV BLD AUTO: 9.6 FL (ref 8.9–12.7)
POTASSIUM SERPL-SCNC: 3.2 MMOL/L (ref 3.5–5.3)
PROT SERPL-MCNC: 6.3 G/DL (ref 6.4–8.4)
PSA SERPL-MCNC: 79.7 NG/ML (ref 0–4)
RBC # BLD AUTO: 3.72 MILLION/UL (ref 3.88–5.62)
SODIUM SERPL-SCNC: 139 MMOL/L (ref 135–147)
WBC # BLD AUTO: 7.73 THOUSAND/UL (ref 4.31–10.16)

## 2023-03-29 RX ADMIN — LEUPROLIDE ACETATE 22.5 MG: KIT at 08:34

## 2023-03-29 NOTE — PROGRESS NOTES
Pt  Resting Comfortably  Port accessed, labs drawn, port saline-locked and deaccessed without issue  Lupron administered  Pt tolerated procedure well  Aware of next appt  AVS given  Pt is aware that he is now scheduled monthly to have his port flushed and labs drawn  He verbalized understanding 
0

## 2023-03-29 NOTE — PROGRESS NOTES
Assessment/Plan:    The patient's clinical examination is consistent with an ingrown nail to the medial border of the right hallucal nail plate with localized infection and abscess formation  A slant back procedure was performed to remove the offending nail border and the superficial abscess drained  The wound was then flushed with hydrogen peroxide and an antimicrobial dressing applied  The remaining pedal nail plates were sharply debrided with a sterile nail clipper without incident  The needles were then mechanically reduced in thickness and girth utilizing a rotary bur  Continue application of triple antibiotic ointment and a Band-Aid to the medial nail border until healed  Start him on a 7-day course of cephalexin 500 mg 3 times a day  A consultation was made for vascular surgery follow-up  Patient does have a history of revascularization back in 2021 but has not had any vascular follow-up since December of that year  Recommend follow-up in 2 weeks  Diagnoses and all orders for this visit:    PAD (peripheral artery disease) ([facility] )  -     Ambulatory Referral to Vascular Surgery; Future    Paronychia of great toe of right foot  -     cephalexin (KEFLEX) 500 mg capsule; Take 1 capsule (500 mg total) by mouth every 8 (eight) hours for 7 days    Onychomycosis          Subjective:      Patient ID: Karli Zarate is a 68 y o  male  The patient presents today for his initial consultation with [facility]'s podiatry with a chief complaint of a painful right great toenail  The patient does note a history of peripheral arterial disease for which he had revascularization done in 2021 on his right leg  He notes chronic numbness to his right lower extremity which he attributes to his history of peripheral arterial disease        The following portions of the patient's history were reviewed and updated as appropriate: allergies, current medications, past family history, past medical history, past social history, past surgical history and problem list       PAST MEDICAL HISTORY:  Past Medical History:   Diagnosis Date   • Cancer ([place] [address] ) 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   • Coronary artery disease 2001    S/p stenting to circumflex and RCA   • HFrEF (heart failure with reduced ejection fraction) ([facility] ) 06/14/2021   • High cholesterol    • Prostate cancer ([place] [address] )        PAST SURGICAL HISTORY:  Past Surgical History:   Procedure Laterality Date   • CARDIAC ELECTROPHYSIOLOGY PROCEDURE N/A 12/23/2021    Procedure: Implant ICD - bi ventricular;  Surgeon: Veronica Florentino MD;  Location: [facility] CARDIAC CATH LAB; Service: Cardiology   • CARDIAC SURGERY     • HI TEAEC W/WO PATCH GRAFT COMMON FEMORAL Right 7/9/2021    Procedure: ENDARTERECTOMY ARTERIAL FEMORAL;  Surgeon: Rayo Barakat DO;  Location: [facility];  Service: Vascular        ALLERGIES:  Patient has no known allergies      MEDICATIONS:  Current Outpatient Medications   Medication Sig Dispense Refill   • abiraterone (ZYTIGA) 250 mg tablet Take 4 tablets (1,000 mg total) by mouth once daily 120 tablet 10   • acetaminophen (TYLENOL) 500 mg tablet Take 2 tablets (1,000 mg total) by mouth 3 (three) times a day as needed for mild pain     • aspirin 81 mg chewable tablet Chew 1 tablet (81 mg total) daily 90 tablet 0   • atorvastatin (LIPITOR) 80 mg tablet Take 1 tablet (80 mg total) by mouth daily with dinner 90 tablet 1   • cephalexin (KEFLEX) 500 mg capsule Take 1 capsule (500 mg total) by mouth every 8 (eight) hours for 7 days 21 capsule 0   • cetirizine (ZyrTEC) 5 MG tablet Take 1 tablet (5 mg total) by mouth daily 90 tablet 3   • clopidogrel (PLAVIX) 75 mg tablet Take 1 tablet (75 mg total) by mouth daily 90 tablet 0   • Empagliflozin (JARDIANCE) 10 MG TABS tablet Take 1 tablet (10 mg total) by mouth every morning 30 tablet 11   • famotidine (PEPCID) 20 mg tablet Take 1 tablet (20 mg total) by mouth daily 30 tablet 2   • ferrous sulfate 325 (65 Fe) mg tablet Take 1 tablet (325 mg total) by mouth 2 (two) times a day with meals 180 tablet 0   • gabapentin (NEURONTIN) 300 mg capsule Take 2 capsules (600 mg total) by mouth 2 (two) times a day To reduce nerve pain in feet  120 capsule 0   • loperamide (IMODIUM) 2 mg capsule Take 1 capsule (2 mg total) by mouth 4 (four) times a day as needed for diarrhea 30 capsule 2   • losartan (COZAAR) 25 mg tablet Take 0 5 tablets (12 5 mg total) by mouth daily 45 tablet 1   • metoprolol succinate (TOPROL-XL) 25 mg 24 hr tablet 1/2 tablet daily 45 tablet 2   • ondansetron (ZOFRAN) 4 mg tablet Take 1 tablet (4 mg total) by mouth every 8 (eight) hours as needed for nausea or vomiting 30 tablet 3   • oxyCODONE (ROXICODONE) 10 MG TABS Take 1 tablet (10 mg total) by mouth 4 (four) times a day as needed (cancer pain) Max Daily Amount: 40 mg 120 tablet 0   • docusate sodium (COLACE) 100 mg capsule Take 100 mg by mouth daily as needed for constipation  Indications: Constipation     • enzalutamide (Xtandi) 40 mg capsule Take 4 capsules (160 mg total) by mouth in the morning  (Patient not taking: No sig reported) 120 capsule 10     No current facility-administered medications for this visit  SOCIAL HISTORY:  Social History     Socioeconomic History   • Marital status:       Spouse name: None   • Number of children: 3   • Years of education: None   • Highest education level: None   Occupational History   • None   Tobacco Use   • Smoking status: Former     Years: 20 00     Types: Cigarettes     Quit date: 2002     Years since quittin 7   • Smokeless tobacco: Never   Vaping Use   • Vaping Use: Never used   Substance and Sexual Activity   • Alcohol use: Not Currently   • Drug use: None   • Sexual activity: None   Other Topics Concern   • None   Social History Narrative   • None     Social Determinants of Health     Financial Resource Strain: Not on file   Food Insecurity: Not on file   Transportation Needs: Not on file   Physical Activity: Not on file "  Stress: Not on file   Social Connections: Not on file   Intimate Partner Violence: Not on file   Housing Stability: Not on file        Review of Systems   Constitutional: Negative  HENT: Negative  Eyes: Negative  Respiratory: Negative  Cardiovascular: Negative  Endocrine: Negative  Musculoskeletal: Negative  Neurological: Negative  Hematological: Negative  Psychiatric/Behavioral: Negative  Objective:      /76 (BP Location: Right arm, Patient Position: Sitting, Cuff Size: Standard)   Pulse 86   Ht 5' 7\" (1 702 m)   Wt 91 3 kg (201 lb 3 2 oz)   SpO2 96%   BMI 31 51 kg/m²          Physical Exam  Vitals reviewed  Constitutional:       Appearance: Normal appearance  HENT:      Head: Normocephalic and atraumatic  Nose: Nose normal    Eyes:      Conjunctiva/sclera: Conjunctivae normal       Pupils: Pupils are equal, round, and reactive to light  Cardiovascular:      Pulses:           Dorsalis pedis pulses are 0 on the right side and 0 on the left side  Posterior tibial pulses are 0 on the right side and 0 on the left side  Pulmonary:      Effort: Pulmonary effort is normal    Feet:      Right foot:      Skin integrity: Erythema present  Toenail Condition: Right toenails are abnormally thick, long and ingrown  Fungal disease present  Left foot:      Toenail Condition: Left toenails are abnormally thick and long  Fungal disease present  Comments:  There is an incurvated medial nail border to the right hallucal nail plate with associated erythema and edema and purulent drainage; there is a dusky hue to the medial nail fold of the right hallux with severe tenderness to palpation    There is some mild diffuse edema of the right lower extremity with some mild dependent rubor noted; there are no other pretrophic changes noted to either foot    The remaining pedal nail plates are thickened and dystrophic and discolored with subungual debris consistent " with onychomycosis  Skin:     General: Skin is warm  Capillary Refill: Capillary refill takes less than 2 seconds  Neurological:      General: No focal deficit present  Mental Status: He is alert and oriented to person, place, and time  Psychiatric:         Mood and Affect: Mood normal          Behavior: Behavior normal          Thought Content:  Thought content normal

## 2023-03-30 ENCOUNTER — PATIENT OUTREACH (OUTPATIENT)
Dept: CASE MANAGEMENT | Facility: HOSPITAL | Age: 78
End: 2023-03-30

## 2023-03-30 ENCOUNTER — HOME CARE VISIT (OUTPATIENT)
Dept: HOME HEALTH SERVICES | Facility: HOME HEALTHCARE | Age: 78
End: 2023-03-30

## 2023-03-30 ENCOUNTER — OFFICE VISIT (OUTPATIENT)
Dept: PALLIATIVE MEDICINE | Facility: CLINIC | Age: 78
End: 2023-03-30

## 2023-03-30 VITALS
DIASTOLIC BLOOD PRESSURE: 60 MMHG | HEART RATE: 83 BPM | SYSTOLIC BLOOD PRESSURE: 120 MMHG | OXYGEN SATURATION: 95 % | BODY MASS INDEX: 31.39 KG/M2 | TEMPERATURE: 98 F | WEIGHT: 200.4 LBS

## 2023-03-30 VITALS
HEART RATE: 65 BPM | TEMPERATURE: 97.8 F | DIASTOLIC BLOOD PRESSURE: 80 MMHG | RESPIRATION RATE: 18 BRPM | OXYGEN SATURATION: 95 % | SYSTOLIC BLOOD PRESSURE: 120 MMHG

## 2023-03-30 DIAGNOSIS — Z51.5 PALLIATIVE CARE PATIENT: ICD-10-CM

## 2023-03-30 DIAGNOSIS — T78.40XD ALLERGY, SUBSEQUENT ENCOUNTER: ICD-10-CM

## 2023-03-30 DIAGNOSIS — G89.3 CANCER RELATED PAIN: ICD-10-CM

## 2023-03-30 DIAGNOSIS — K59.03 THERAPEUTIC OPIOID-INDUCED CONSTIPATION (OIC): ICD-10-CM

## 2023-03-30 DIAGNOSIS — C61 PROSTATE CANCER (HCC): ICD-10-CM

## 2023-03-30 DIAGNOSIS — T40.2X5A THERAPEUTIC OPIOID-INDUCED CONSTIPATION (OIC): ICD-10-CM

## 2023-03-30 DIAGNOSIS — L02.619 ABSCESS OF FOOT: Primary | ICD-10-CM

## 2023-03-30 DIAGNOSIS — G89.29 CHRONIC MIDLINE LOW BACK PAIN: ICD-10-CM

## 2023-03-30 DIAGNOSIS — M54.50 CHRONIC MIDLINE LOW BACK PAIN: ICD-10-CM

## 2023-03-30 PROBLEM — F01.B0 MODERATE VASCULAR DEMENTIA (HCC): Chronic | Status: ACTIVE | Noted: 2023-03-30

## 2023-03-30 RX ORDER — IBUPROFEN 200 MG
400 TABLET ORAL
Qty: 180 TABLET | Refills: 0 | Status: ON HOLD | OUTPATIENT
Start: 2023-03-30

## 2023-03-30 RX ORDER — OXYCODONE HYDROCHLORIDE 10 MG/1
10 TABLET ORAL 4 TIMES DAILY PRN
Qty: 120 TABLET | Refills: 0 | Status: ON HOLD | OUTPATIENT
Start: 2023-04-17

## 2023-03-30 RX ORDER — SENNA PLUS 8.6 MG/1
1 TABLET ORAL
Qty: 30 TABLET | Refills: 11 | Status: ON HOLD | OUTPATIENT
Start: 2023-03-30

## 2023-03-30 NOTE — PROGRESS NOTES
This MSW and Ryan Morin had a conference call with Dr Edwin Parra who expressed concerns about patient's ability to navigate his care and his personal business  Discussion of the lack of support from patient's children was discussed  MSW will call patient to discuss chairlyft  MSW will ask patient about how much support he can expect from his son and dtr  MSW will call patient's  Diane Queen from [facility] agency on aging to discuss services that can be offered to patient and well checks  MSW will continue to follow

## 2023-03-30 NOTE — Clinical Note
Teammates, please note that this patient probably should not be the primary recipient of phone calls to coordinate cares  His son does not answer phone well during business hours  I have tried to get the pt's daughter Zion Wan to  the responsibility  If you continue to struggle with the patient completing rec'd treatments and cares, please let me know, and we may need to petition for guardianship

## 2023-03-30 NOTE — PROGRESS NOTES
Outpatient Follow-Up - Palliative and Supportive Care   Eve Rene 68 y o  male [id number]    Assessment & Plan  Problem List Items Addressed This Visit    None      Medications adjusted this encounter:  Requested Prescriptions      No prescriptions requested or ordered in this encounter     No orders of the defined types were placed in this encounter  There are no discontinued medications  - Ongoing close f/up in Palliative clinic  Next visit 6-8 wks  - No changes to meds today  Will send 2 mo of Rx  - Oncology SW :  Ms Svitlana Jacob following for several items, but pt has not released any financial records to help understand his assets  = Financial difficulties - pt STILL using dead wife's broken cane as ambulatory aid  Verified by home health RN as of 3/30/23    = Transport - pt has visual challenges and relies very much on son for transport  Son also needs to work  = Hard of hearing - would benefit from hearing aid   = Fixing stairglide/chairlift in pt's home  Confirmed that this was resolved and improved by home health RN 3/30/23   - Pt continues to be an ongoing risk for medication misuse:   = blind in one eye, presbycusis, limited medical savvy with very limited oversight from his family in medication checks  = Ongoing work with home health RN to [address] [place] [address] Drive all meds  - Home health : blister packing pt's meds for improved adherence  = Limited health literacy - does not know names of meds; has trouble reading bottles d/t visual impairment  - If neither of patient's children can act as financial or medical mohr of atty, for various reasons, we may need to petition for guardianship in order to accomplish the goal of keeping pt in his home, and pursing treatments to fight his cancer  Calvillo Can was seen today for symptoms and planning cares related to above illnesses    I have reviewed the patient's controlled substance dispensing history in the Prescription Drug Monitoring Program in compliance with the [facility] regulations before prescribing any controlled substances  They are invited to continue to follow with us  If there are questions or concerns, please contact us through our clinic/answering service 24 hours a day, seven days a week  Edwin Boyle  [facility] Palliative and Supportive Care        Visit Information    Accompanied By: none    Source of History: Self    History Limitations: limited health literacy, dementia    Contacts: son Balbir Olguin - [phone]               Daughter Marley Martinez - [phone]    History of Present Illness      Aristeo Rene is a 68 y o  male who presents in follow up of symptoms related to Stage IV prostate cancer, hormone treatment resistant  He follow with Dr Raymundo Radford and Ms Mery Hartman for this  There is also a significant cardiovascular history, with heart failure management by Dr Serafin Ballard, and PAD with distal pain at rest in both feet  Pertinent issues include: symptom management, resource management  Since last visit, he went to podiatry and was found not only to have an ingrown nail, but this was asso with abscess and localized cellulitis  He was given instructions for systemic ABx, as well as topical triple Abx cream, and a revisit to podiatry in two weeks  Cancer cares:  Still has not completed PET scan, but this is scheduled for next Thursday, 4/6  His PSA trends have been upgoing since Winter 2022, last value >70 in March 2023  He has been previously judged by Dr Raymundo Radford as inappropriate for cytotoxic therapies  Vascular health:  Pt was referred to [facility], and when a call was placed to the pt to schedule, he did not understand the nature of the call, and he declined any f/up  Vulnerable adult:      He has had [facility] set up, but he reports [facility] would not take him home from treatments and tests  Has Meals on Wheels weekdaily for midday meal only  He can prep breakfast, but not any other meals      Pt son "presents with him today, 3/30/2023, and states that there are no waiver services set up, there are no formal mohr of atty, and that Salbador Burgess himself cannot be relied upon for transport, coordination, or medical supervision at this time  If waiver services could be gained, Salbador Burgess would wish to be the beneficiary of caregiver hours  We discussed today that it would then be best to have Jessica have both medical and financial mohr of atty  Jessica could not be reached by phone today, but a message was left for her to call our office to discuss  Previously, we note that patient has troubles ambulating, and he has visual challenges that should prevent him from driving  He also endorses losing items about his home, and a concern about his memory  Chores about the house are nearly impossible because he cannot bend over, and he can't stand for more than about 3min at a time  This complicates his ability to cook for self as well  Since abscess formed in his R hallux, he is unable to shower, given his inability to stand, as well as not knowing if he can get his foot wet  Pt is and has been homebound for several months      From our first visit on 3/1/22:    \"Pt has demonstrated -- over a year ago now -- progression of his illness (rising PSA and decreasing functional status) despite aggressive therapies and cytotoxic treatments  His side effects vs decompensation in his overall illness was so severe he was admitted to [address] [place] [address] in march 2021  Since that time, he has recovered some function with careful management of his cardiac disease, and less aggressive, less toxic cancer cares  At this time, his treatment plan involves Xofigo for bone-avid disease, +/- Lupron  We note that the patient has demonstrated some disease progression on other hormonal blockers, but that this plan of care is being offered, if not rec'd, by Dr Dennis Lock      Pt presents to our office with suboptimally controlled pain, " "ongoing wt loss, poor appetite, and general malaise  His concerns are more related to his back and R foot pain; a Podiatrist today advised him that his unilateral neuropathic symptoms 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        Today, he can't quite recall his medications, he just insists that he needs 'something stronger than tynlenol'  When we pointed out that he has used tramadol and oxyIR recently, he also stated that these medications were not helpful  Pt endorses support from his son, who will drive pt to appts and on other errands  Pt can't name his meds, but has some vague understanding of the indications for his meds  \"    Past medical, surgical, social, and family histories are reviewed and pertinent updates are made  Review of Systems   Unable to perform ROS: dementia         Vital Signs    There were no vitals taken for this visit  Physical Exam and Objective Data  Physical Exam  Constitutional:       General: He is not in acute distress  Appearance: He is not diaphoretic  Comments: Frail   HENT:      Head: Normocephalic and atraumatic  Right Ear: External ear normal       Left Ear: External ear normal    Eyes:      General:         Right eye: No discharge  Left eye: No discharge  Conjunctiva/sclera: Conjunctivae normal       Pupils: Pupils are equal, round, and reactive to light  Neck:      Trachea: No tracheal deviation  Cardiovascular:      Rate and Rhythm: Regular rhythm  Tachycardia present  Pulmonary:      Effort: Pulmonary effort is normal  No respiratory distress  Breath sounds: No stridor  Abdominal:      Palpations: Abdomen is soft  Comments: scaphoid   Musculoskeletal:      Comments: Foot not examined today   Skin:     General: Skin is warm and dry  Coloration: Skin is pale  Findings: No erythema or rash  Neurological:      General: No focal deficit present  Mental Status: Mental status is at baseline   He is " disoriented  Cranial Nerves: No cranial nerve deficit  Psychiatric:         Mood and Affect: Mood normal          Behavior: Behavior normal       Comments: Judgment and insight limited       Radiology and Laboratory:  I personally reviewed and interpreted the following results: none new    35+ minutes was spent face to face with Royce Rene  with greater than 50% of the time spent in counseling or coordination of care including: chart review; symptom pursuit; supportive listening; anticipatory guidance  review and assessment of decisional apparatus and capacity, applicable legal codes and extant documents;   All of the patient's or agent's questions were answered during this discussion

## 2023-03-31 ENCOUNTER — TELEPHONE (OUTPATIENT)
Dept: CARDIOLOGY CLINIC | Facility: CLINIC | Age: 78
End: 2023-03-31

## 2023-03-31 ENCOUNTER — TELEPHONE (OUTPATIENT)
Dept: HEMATOLOGY ONCOLOGY | Facility: CLINIC | Age: 78
End: 2023-03-31

## 2023-03-31 ENCOUNTER — HOME CARE VISIT (OUTPATIENT)
Dept: HOME HEALTH SERVICES | Facility: HOME HEALTHCARE | Age: 78
End: 2023-03-31

## 2023-03-31 RX ORDER — CETIRIZINE HYDROCHLORIDE 5 MG/1
5 TABLET ORAL DAILY
Qty: 90 TABLET | Refills: 3 | Status: ON HOLD | OUTPATIENT
Start: 2023-03-31

## 2023-03-31 RX ORDER — METHOCARBAMOL 500 MG/1
500 TABLET, FILM COATED ORAL 3 TIMES DAILY
Qty: 90 TABLET | Refills: 0 | Status: ON HOLD | OUTPATIENT
Start: 2023-03-31

## 2023-03-31 NOTE — TELEPHONE ENCOUNTER
----- Message from Heath Mansfield sent at 3/28/2023  2:53 PM EDT -----    ----- Message -----  From:  Kristal Porter MD  Sent: 3/28/2023   2:45 PM EDT  To: Cardiology [place] [facility]    Please let him know kidney function stable and rest of BMP normal  Continue current medications

## 2023-03-31 NOTE — TELEPHONE ENCOUNTER
Ruston calling from [employer] to inform Dr Jamie Staples to disregard the  initial message for medication Xgeva    Their vendor doesn't handle  Azalia Boeck

## 2023-03-31 NOTE — TELEPHONE ENCOUNTER
Patient Call Form   Reason for patient call? [employer] calling about XGEVA 120mg (Denosumab)  Checking if provider is OK with home injections  Patient's primary oncologist? [place] Memos   Name of person the patient was calling for? Heidi Memos   Does the patient issue require a call back? Yes, [phone]  8am-5pm [place]   If call back required, inform patient that the message will be forwarded to the team and someone will get back to them as soon as possible    Did you relay this information to the patient?  yes

## 2023-04-03 ENCOUNTER — PATIENT OUTREACH (OUTPATIENT)
Dept: CASE MANAGEMENT | Facility: HOSPITAL | Age: 78
End: 2023-04-03

## 2023-04-03 ENCOUNTER — HOME CARE VISIT (OUTPATIENT)
Dept: HOME HEALTH SERVICES | Facility: HOME HEALTHCARE | Age: 78
End: 2023-04-03

## 2023-04-03 PROBLEM — L03.031 PARONYCHIA OF GREAT TOE OF RIGHT FOOT: Status: ACTIVE | Noted: 2023-04-03

## 2023-04-03 NOTE — PROGRESS NOTES
MSW phoned and left a  Voice message on patient's mobile number patient's Home voice mail is full MSW unable to leave a voice message  MSW awaits return call  MSW also called [facility] agency on aging and left a message for Patient's aging worker is Christiano Lowry [phone] to call this MSW back    MSW will continue to follow

## 2023-04-04 ENCOUNTER — HOME CARE VISIT (OUTPATIENT)
Dept: HOME HEALTH SERVICES | Facility: HOME HEALTHCARE | Age: 78
End: 2023-04-04

## 2023-04-04 PROBLEM — E87.6 HYPOKALEMIA: Status: ACTIVE | Noted: 2023-04-04

## 2023-04-04 PROBLEM — R53.1 GENERALIZED WEAKNESS: Status: ACTIVE | Noted: 2023-04-04

## 2023-04-04 PROBLEM — R77.8 TROPONIN LEVEL ELEVATED: Status: ACTIVE | Noted: 2023-04-04

## 2023-04-04 PROBLEM — R79.89 TROPONIN LEVEL ELEVATED: Status: ACTIVE | Noted: 2023-04-04

## 2023-04-05 ENCOUNTER — HOME CARE VISIT (OUTPATIENT)
Dept: HOME HEALTH SERVICES | Facility: HOME HEALTHCARE | Age: 78
End: 2023-04-05

## 2023-04-07 ENCOUNTER — TELEPHONE (OUTPATIENT)
Dept: VASCULAR SURGERY | Facility: CLINIC | Age: 78
End: 2023-04-07

## 2023-04-07 PROBLEM — E87.8 ELECTROLYTE ABNORMALITY: Status: ACTIVE | Noted: 2023-04-07

## 2023-04-07 NOTE — TELEPHONE ENCOUNTER
From: Jaret Booth <[email or web address]  [email or web address]   Sent: Friday, April 7, 2023 2:08 PM  To: Vascular Surgery Schedulers [email or web address]  Subject: Sang Benitez Free 1945    Gracie Carrera is currently admitted to [facility]  He has a nonhealing right great toe wound and will need to be set up for an outpatient RLE angiogram with possible intervention and anesthesia support  Dr Raymond Lynn would like this to be done at [facility] or [facility], which ever has earlier availability  He will be discharged and come back for this  I have spoken to his son, Delona Carrel regarding this since he makes medical decisions for him  Thanks and Have a happy easter!     [facility]

## 2023-04-12 NOTE — TELEPHONE ENCOUNTER
Verified patient's insurance   CONFIRMED - Patient's insurance is [employer]   Is patient requesting a call when authorization has been obtained? Patient did not request a call  Surgery Date: 4-13-23  Primary Surgeon: MALI // Carlito Husain (NPI: [id number])  Assisting Surgeon: Not Applicable (N/A)  Facility: [facility] (Tax: [id number] / NPI: [id number])  Inpatient / Outpatient: Outpatient  Level: 2    Clearance Received: No clearance ordered  Consent Received: Consent will be signed day of procedure  Medication Hold / Last Dose: Not Applicable (N/A)  IR Notified: Not Applicable (N/A)  Rep  Notified: Not Applicable (N/A)  Equipment Needs: Not Applicable (N/A)  Vas Lab Requested: Not Applicable (N/A)  Patient Contacted: 4-12-23    Diagnosis: I73 9  Procedure/ CPT Code(s): Angiogram // CPT: 84243, [id number] and 54940 // Procedure to take place in OR [Auth/ Cert Based]    For varicose vein related procedures:   Last LEVDR: Not Applicable (N/A)  CEAP Classification: Not Applicable (N/A)  VCSS: Not Applicable (N/A)    Post Operative Date/ Time: To Be Determined (TBD)     *Please review medication hold(s), PATs, and check H&P with patient  *  PATIENT WAS MAILED SURGERY/SHOWERING/DISCHARGE/COVID INSTRUCTIONS AFTER REVIEWING WITH THEM VIA PHONE CALL       Per Dr Anam Abarca to schedule patient tomorrow

## 2023-04-12 NOTE — TELEPHONE ENCOUNTER
Spoke with Doyle Fuchs () at  [facility] patient being discharged to [facility] today I gave her the time for procedure 10:30am  and approx arrival time 9:00am     I also contacted Anitha Perez at [facility] and gave her the time and pre-op instruction [facility]

## 2023-04-12 NOTE — TELEPHONE ENCOUNTER
Dr Mary Pang came to the office stating that patient is being discharged today to [facility] I called and left message for patients son (Alec) to call us back so that we can discuss the procedure LLF

## 2023-04-13 PROBLEM — D75.839 THROMBOCYTOSIS: Status: ACTIVE | Noted: 2023-04-13

## 2023-04-13 NOTE — TELEPHONE ENCOUNTER
Referral number  [id number] has been updated to reflect date of service change, location change and provider

## 2023-04-13 NOTE — TELEPHONE ENCOUNTER
Spoke to Christie Eaton at [facility] about letting them know that patients procedure was canceled for today due to there was an emergency at [address] and rescheduled for 4-18-23 at [facility] with Dr Saud Rocha faxed over new instructions to [address] [place]

## 2023-04-13 NOTE — TELEPHONE ENCOUNTER
Pt's angiogram was not done today due to an emergency in the OR  He has been rescheduled to 4/18/23 in [facility] with Dr Sudhakar Coleman Dub has spoken with [facility] to make them aware

## 2023-04-26 ENCOUNTER — HOSPITAL ENCOUNTER (OUTPATIENT)
Dept: INFUSION CENTER | Facility: CLINIC | Age: 78
Discharge: HOME | End: 2023-04-26

## 2023-04-26 ENCOUNTER — OFFICE VISIT (OUTPATIENT)
Dept: VASCULAR SURGERY | Facility: CLINIC | Age: 78
End: 2023-04-26

## 2023-04-26 ENCOUNTER — TELEPHONE (OUTPATIENT)
Dept: PODIATRY | Facility: CLINIC | Age: 78
End: 2023-04-26

## 2023-04-26 VITALS
WEIGHT: 198 LBS | HEART RATE: 78 BPM | BODY MASS INDEX: 30.01 KG/M2 | DIASTOLIC BLOOD PRESSURE: 78 MMHG | HEIGHT: 68 IN | SYSTOLIC BLOOD PRESSURE: 144 MMHG

## 2023-04-26 DIAGNOSIS — M79.606 ISCHEMIC LEG PAIN: ICD-10-CM

## 2023-04-26 DIAGNOSIS — L08.9 DIABETIC FOOT INFECTION (HCC): ICD-10-CM

## 2023-04-26 DIAGNOSIS — M79.604 PAIN OF RIGHT LOWER EXTREMITY: ICD-10-CM

## 2023-04-26 DIAGNOSIS — Z95.828 PORT-A-CATH IN PLACE: ICD-10-CM

## 2023-04-26 DIAGNOSIS — I70.299 ATHEROSCLEROSIS OF ARTERY OF EXTREMITY WITH ULCERATION (HCC): Primary | ICD-10-CM

## 2023-04-26 DIAGNOSIS — E11.628 DIABETIC FOOT INFECTION (HCC): ICD-10-CM

## 2023-04-26 DIAGNOSIS — C61 PROSTATE CANCER (HCC): Primary | ICD-10-CM

## 2023-04-26 DIAGNOSIS — I99.8 ISCHEMIC LEG PAIN: ICD-10-CM

## 2023-04-26 DIAGNOSIS — L97.909 ATHEROSCLEROSIS OF ARTERY OF EXTREMITY WITH ULCERATION (HCC): Primary | ICD-10-CM

## 2023-04-26 LAB
ALBUMIN SERPL BCP-MCNC: 3.8 G/DL (ref 3.5–5)
ALP SERPL-CCNC: 112 U/L (ref 34–104)
ALT SERPL W P-5'-P-CCNC: 20 U/L (ref 7–52)
ANION GAP SERPL CALCULATED.3IONS-SCNC: 9 MMOL/L (ref 4–13)
AST SERPL W P-5'-P-CCNC: 21 U/L (ref 13–39)
BASOPHILS # BLD AUTO: 0.02 THOUSANDS/ΜL (ref 0–0.1)
BASOPHILS NFR BLD AUTO: 1 % (ref 0–1)
BILIRUB SERPL-MCNC: 0.6 MG/DL (ref 0.2–1)
BUN SERPL-MCNC: 11 MG/DL (ref 5–25)
CALCIUM SERPL-MCNC: 8.2 MG/DL (ref 8.4–10.2)
CHLORIDE SERPL-SCNC: 110 MMOL/L (ref 96–108)
CO2 SERPL-SCNC: 23 MMOL/L (ref 21–32)
CREAT SERPL-MCNC: 0.68 MG/DL (ref 0.6–1.3)
EOSINOPHIL # BLD AUTO: 0.21 THOUSAND/ΜL (ref 0–0.61)
EOSINOPHIL NFR BLD AUTO: 5 % (ref 0–6)
ERYTHROCYTE [DISTWIDTH] IN BLOOD BY AUTOMATED COUNT: 14.7 % (ref 11.6–15.1)
GFR SERPL CREATININE-BSD FRML MDRD: 92 ML/MIN/1.73SQ M
GLUCOSE SERPL-MCNC: 98 MG/DL (ref 65–140)
HCT VFR BLD AUTO: 33 % (ref 36.5–49.3)
HGB BLD-MCNC: 10.2 G/DL (ref 12–17)
IMM GRANULOCYTES # BLD AUTO: 0.01 THOUSAND/UL (ref 0–0.2)
IMM GRANULOCYTES NFR BLD AUTO: 0 % (ref 0–2)
LYMPHOCYTES # BLD AUTO: 1.32 THOUSANDS/ΜL (ref 0.6–4.47)
LYMPHOCYTES NFR BLD AUTO: 30 % (ref 14–44)
MCH RBC QN AUTO: 27.3 PG (ref 26.8–34.3)
MCHC RBC AUTO-ENTMCNC: 30.9 G/DL (ref 31.4–37.4)
MCV RBC AUTO: 89 FL (ref 82–98)
MONOCYTES # BLD AUTO: 0.34 THOUSAND/ΜL (ref 0.17–1.22)
MONOCYTES NFR BLD AUTO: 8 % (ref 4–12)
NEUTROPHILS # BLD AUTO: 2.53 THOUSANDS/ΜL (ref 1.85–7.62)
NEUTS SEG NFR BLD AUTO: 56 % (ref 43–75)
NRBC BLD AUTO-RTO: 0 /100 WBCS
PLATELET # BLD AUTO: 288 THOUSANDS/UL (ref 149–390)
PMV BLD AUTO: 10 FL (ref 8.9–12.7)
POTASSIUM SERPL-SCNC: 3.2 MMOL/L (ref 3.5–5.3)
PROT SERPL-MCNC: 7.1 G/DL (ref 6.4–8.4)
RBC # BLD AUTO: 3.73 MILLION/UL (ref 3.88–5.62)
SODIUM SERPL-SCNC: 142 MMOL/L (ref 135–147)
WBC # BLD AUTO: 4.43 THOUSAND/UL (ref 4.31–10.16)

## 2023-04-26 NOTE — ASSESSMENT & PLAN NOTE
Right great toe ulcer and infection with severe PAD  Underwent balloon angioplasty and extensive SFA stenting  He also had PTA of the anterior tibial artery with excellent results  All access sites are doing well  Pain is improving  Arterial blood flow has improved significantly  We will post procedure doppler to evaluate further  Continue daily aspirin and clopidogrel on a daily basis

## 2023-04-26 NOTE — TELEPHONE ENCOUNTER
Caller: Angel Smith 84    Doctor/Office: Dr Carter Peres    #: [phone] (personal cell)    Escalation: Care  Calling on behalf of patient Kristy Kuhn 7213, has an open wound, cancer to bone, would like a call back to discuss care for the wound  Thank you

## 2023-04-26 NOTE — PATIENT INSTRUCTIONS
Atherosclerosis of artery of extremity with ulceration (HCC)  Right great toe ulcer and infection with severe PAD  Underwent balloon angioplasty and extensive SFA stenting  He also had PTA of the anterior tibial artery with excellent results  All access sites are doing well  Pain is improving  Arterial blood flow has improved significantly  We will post procedure doppler to evaluate further  Continue daily aspirin and clopidogrel on a daily basis

## 2023-04-26 NOTE — PROGRESS NOTES
"Assessment/Plan:    Atherosclerosis of artery of extremity with ulceration (HCC)  Right great toe ulcer and infection with severe PAD  Underwent balloon angioplasty and extensive SFA stenting  He also had PTA of the anterior tibial artery with excellent results  All access sites are doing well  Pain is improving  Arterial blood flow has improved significantly  We will post procedure doppler to evaluate further  Continue daily aspirin and clopidogrel on a daily basis  Diagnoses and all orders for this visit:    Ischemic leg pain    Pain of right lower extremity    Atherosclerosis of artery of extremity with ulceration ([address] )  -     Ambulatory Referral to Podiatry; Future    Diabetic foot infection (HCC)        Subjective:      Patient ID: Tanya Becerril is a 68 y o  male  HPI  Patient had an Agram on 4/18/23 by IR  Pt states his Rt foot feels swollen and like he is walking on a balloon  Pt ambulates with a walker and has pain on the bottom of the Rt foot  Pt denies rest pain  Pt is on ASA 81 mg, Plavix, and Atorvastatin  The following portions of the patient's history were reviewed and updated as appropriate: allergies, current medications, past family history, past medical history, past social history, past surgical history and problem list     Review of Systems   Musculoskeletal: Positive for gait problem  RLE pain   Skin: Positive for color change and wound  I have reviewed the ROS as entered and made changes as necessary  Objective:      /78 (BP Location: Left arm, Patient Position: Sitting)   Pulse 78   Ht 5' 8\" (1 727 m)   Wt 89 8 kg (198 lb)   BMI 30 11 kg/m²          Physical Exam  Vitals and nursing note reviewed  Constitutional:       Appearance: Normal appearance  HENT:      Head: Normocephalic and atraumatic  Cardiovascular:      Rate and Rhythm: Normal rate and regular rhythm        Pulses:           Dorsalis pedis pulses are detected w/ Doppler on the " right side and detected w/ Doppler on the left side  Posterior tibial pulses are detected w/ Doppler on the left side  Comments: triphasic AT on right foot  Abdominal:      Comments: Puncture site is soft well healed   Skin:     General: Skin is warm  Capillary Refill: Capillary refill takes less than 2 seconds  Neurological:      General: No focal deficit present  Mental Status: He is alert and oriented to person, place, and time     Psychiatric:         Mood and Affect: Mood normal

## 2023-04-27 LAB — PSA SERPL-MCNC: 332.6 NG/ML (ref 0–4)

## 2023-04-27 RX ORDER — BLOOD-GLUCOSE METER
EACH MISCELLANEOUS
OUTPATIENT
Start: 2023-04-27

## 2023-04-27 RX ORDER — LANCING DEVICE
EACH MISCELLANEOUS
Qty: 1 EACH | OUTPATIENT
Start: 2023-04-27

## 2023-05-02 RX ORDER — LANCING DEVICE
EACH MISCELLANEOUS
Qty: 1 EACH | OUTPATIENT
Start: 2023-05-02

## 2023-05-02 RX ORDER — BLOOD-GLUCOSE METER
EACH MISCELLANEOUS
OUTPATIENT
Start: 2023-05-02

## 2023-05-03 ENCOUNTER — TELEPHONE (OUTPATIENT)
Dept: PALLIATIVE MEDICINE | Facility: CLINIC | Age: 78
End: 2023-05-03

## 2023-05-03 DIAGNOSIS — Z51.5 PALLIATIVE CARE PATIENT: ICD-10-CM

## 2023-05-03 DIAGNOSIS — G89.3 CANCER-RELATED PAIN: Chronic | ICD-10-CM

## 2023-05-03 RX ORDER — OXYCODONE HYDROCHLORIDE 5 MG/1
5 TABLET ORAL EVERY 6 HOURS PRN
Qty: 120 TABLET | Refills: 0 | Status: SHIPPED | OUTPATIENT
Start: 2023-05-03

## 2023-05-03 NOTE — TELEPHONE ENCOUNTER
Patient called to check on status of refill request for oxycodone 5mg   He states he is out of medication    [facility]    Next scheduled appt 5/11/2023

## 2023-05-04 ENCOUNTER — TELEPHONE (OUTPATIENT)
Dept: GYNECOLOGIC ONCOLOGY | Facility: CLINIC | Age: 78
End: 2023-05-04

## 2023-05-04 NOTE — TELEPHONE ENCOUNTER
Called patient on for missed appointment  Left a voice message to call the office back to reschedule

## 2023-05-09 ENCOUNTER — TELEPHONE (OUTPATIENT)
Dept: HEMATOLOGY ONCOLOGY | Facility: CLINIC | Age: 78
End: 2023-05-09

## 2023-05-09 DIAGNOSIS — I73.9 PAD (PERIPHERAL ARTERY DISEASE) (HCC): ICD-10-CM

## 2023-05-09 RX ORDER — GABAPENTIN 300 MG/1
600 CAPSULE ORAL 2 TIMES DAILY
Qty: 120 CAPSULE | Refills: 0 | Status: SHIPPED | OUTPATIENT
Start: 2023-05-16

## 2023-05-09 NOTE — TELEPHONE ENCOUNTER
Appointment Confirmation   Who are you speaking with? Patient   If it is not the patient, are they listed on an active communication consent form? N/A   Which provider is the appointment scheduled with? PET scan 5/18   When is the appointment scheduled? Please list date and time 5/18/23 12pm   At which location is the appointment scheduled to take place? [place]   Did caller verbalize understanding of appointment details?  Yes

## 2023-05-10 RX ORDER — LANCING DEVICE
EACH MISCELLANEOUS
Qty: 1 EACH | OUTPATIENT
Start: 2023-05-10

## 2023-05-10 RX ORDER — BLOOD-GLUCOSE METER
EACH MISCELLANEOUS
OUTPATIENT
Start: 2023-05-10

## 2023-05-10 NOTE — TELEPHONE ENCOUNTER
Declining request for diabetes supplies  As Dr Katarzyna Robertson documented on 5/2/23, the patients PCP should manage his diabetes and associated equipment

## 2023-05-11 ENCOUNTER — OFFICE VISIT (OUTPATIENT)
Dept: PALLIATIVE MEDICINE | Facility: CLINIC | Age: 78
End: 2023-05-11

## 2023-05-11 VITALS
HEART RATE: 82 BPM | WEIGHT: 180.56 LBS | BODY MASS INDEX: 27.45 KG/M2 | TEMPERATURE: 97 F | SYSTOLIC BLOOD PRESSURE: 122 MMHG | DIASTOLIC BLOOD PRESSURE: 68 MMHG | OXYGEN SATURATION: 99 %

## 2023-05-11 DIAGNOSIS — Z51.5 PALLIATIVE CARE PATIENT: ICD-10-CM

## 2023-05-11 DIAGNOSIS — G89.3 CANCER-RELATED PAIN: Chronic | ICD-10-CM

## 2023-05-11 RX ORDER — OXYCODONE HYDROCHLORIDE 5 MG/1
5 TABLET ORAL EVERY 6 HOURS PRN
Qty: 120 TABLET | Refills: 0 | Status: SHIPPED | OUTPATIENT
Start: 2023-05-29

## 2023-05-11 NOTE — PROGRESS NOTES
Outpatient Follow-Up - Palliative and Supportive Care   Rafaela Montes Free 68 y o  male [id number]    Assessment & Plan  Problem List Items Addressed This Visit     Cancer-related pain (Chronic)    Relevant Medications    oxyCODONE (ROXICODONE) 5 immediate release tablet (Start on 5/29/2023)    Palliative care patient (Chronic)    Relevant Medications    oxyCODONE (ROXICODONE) 5 immediate release tablet (Start on 5/29/2023)     Medications adjusted this encounter:  Requested Prescriptions     Signed Prescriptions Disp Refills   • oxyCODONE (ROXICODONE) 5 immediate release tablet 120 tablet 0     Sig: Take 1 tablet (5 mg total) by mouth every 6 (six) hours as needed for moderate pain Max Daily Amount: 20 mg Do not start before May 29, 2023  No orders of the defined types were placed in this encounter  Medications Discontinued During This Encounter   Medication Reason   • oxyCODONE (ROXICODONE) 5 immediate release tablet Reorder      - Ongoing close f/up in Palliative clinic  Next visit 4 wks  - No changes to meds today  Will send 1 mo of Rx  - Oncology SW :  Ms Venkatesh Ambriz following for several items, but pt has not released any financial records to help understand his assets  = Financial difficulties - pt STILL using dead wife's broken cane as ambulatory aid  Verified by home health RN as of 3/30/23    = Transport - pt has visual challenges and relies very much on son for transport  Son also needs to work  = Hard of hearing - would benefit from hearing aid   = Fixing stairglide/chairlift in pt's home  Confirmed that this was resolved and improved by home health RN 3/30/23   - Pt continues to be an ongoing risk for medication misuse:   = blind in one eye, presbycusis, limited medical savvy with very limited oversight from his family in medication checks  = Ongoing work with home health RN to [address] [place] [address] Drive all meds  - Home health : blister packing pt's meds for improved adherence     = Limited health literacy - does not know names of meds; has trouble reading bottles d/t visual impairment  - If none of patient's children can act as financial or medical mohr of atty, for various reasons, we may need to petition for guardianship in order to accomplish the goal of keeping pt in his home, and pursing treatments to fight his cancer  Anny Brito was seen today for symptoms and planning cares related to above illnesses  I have reviewed the patient's controlled substance dispensing history in the Prescription Drug Monitoring Program in compliance with the [facility] regulations before prescribing any controlled substances  They are invited to continue to follow with us  If there are questions or concerns, please contact us through our clinic/answering service 24 hours a day, seven days a week  [facility] Palliative and Supportive Care        Visit Information    Accompanied By: none    Source of History: Self    History Limitations: limited health literacy, dementia    Contacts: son Gabriel Layton - [phone]               Daughter Snow Nielsen - [phone]  [facility]    History of Present Illness      Anel Rene is a 68 y o  male who presents in follow up of symptoms related to Stage IV prostate cancer, hormone treatment resistant  He follows with Dr Bernadette Rojas and Ms Cyndee Casillas for this  There is also a significant cardiovascular history, with heart failure management by Dr Cortney Garner, and PAD with distal pain at rest in both feet  Pertinent issues include: symptom management, resource management  Since last visit, in March 2023, pt has hospitalized for a planned angio of LE, and this was cancelled due to primary of another urgent vascular case  This procedure was later completed  Pt also [place] his appt with Dr Bernadette Rojas last week, 5/4  Pt was not reachable by phone  Since last wt check end of April, pt has lost nearly 20#    This was only two weeks ago  He reports that he is not eating well because he cannot cook for himself  He reports that his son is getting some store-bought food, but not prepping any meals  Pt reports that his daughter Lolita Leon is unable to devote any time to assist the family  Pt's last son Brett Darling lives in [place], and has not expressed any interest in providing assistance nor support  Pt pays mortgage on his home, and has not added anyone to the deed/title  He reports that this is due to GI bill provisions, and since Alec is not a , he cannot have the home through this mortgage  On my visit this AM, there are no family at bedside, and the pt is not an appropriate historian  Nursing reports and charts are reviewed  Bedside rounding performed with RN  Thankfully, the pt can report that his pain is well controlled, and he does bring a paper record of his medications from home care agency, updated 5/5/23  Our records are updated using this form  A phone call is placed ot pt's daughter  No answer  LM on VM that pt is failing to show for rec'd therapies, and our teams' efforts are not improving his outcomes, according to some of the lab and imaging data  Attempted to express dangerous nature of negligence in pt's care, and invite Char to participate better in pt's care  Pt home care RN team is, as of 5/11/2023:  Lamar Pace [address] [address]  p , f   Attending Dr Flavia Boyd cares:  Still has not completed PET scan, overdue since, 4/6/23  His PSA trends have been upgoing since Winter 2022, last value >300 in April 2023  He has been previously judged by Dr Omayra Castaneda as inappropriate for cytotoxic therapies  We see from home health records that he is taking abiraterone        Vascular health:  Pt was referred to VascSurg, and when a call was placed to the pt to schedule, he did not understand the nature of "the call, and he declined any f/up  Eventually, this procedure was completed on 4/18/23 by Dr Wesley Bain, but no f/up records nor wound cares are available to me  Vulnerable adult:      He has had [facility] set up, but he reports [facility] would not take him home from treatments and tests  Has Meals on Wheels weekdaily for midday meal only  He can prep breakfast, but not any other meals  Pt's son presented with him on 3/30/2023, and states that there are no waiver services set up, there are no formal mohr of atty, and that Frankie Rowland himself cannot be relied upon for transport, coordination, or medical supervision at this time  If waiver services could be gained, Frankie Rowland would wish to be the beneficiary of caregiver hours  We discussed today that it would then be best to have daughter Ana Blanchard have both medical and financial mohr of atty  Jessica could not be reached by phone today, but a message was left for her to call our office to discuss  Previously, we note that patient has troubles ambulating, and he has visual challenges that should prevent him from driving  He also endorses losing items about his home, and a concern about his memory  Chores about the house are nearly impossible because he cannot bend over, and he can't stand for more than about 3min at a time  This complicates his ability to cook for self as well  Since abscess formed in his R hallux, he is unable to shower, given his inability to stand, as well as not knowing if he can get his foot wet  Pt is and has been homebound for several months      From our first visit on 3/1/22:    \"Pt has demonstrated -- over a year ago now -- progression of his illness (rising PSA and decreasing functional status) despite aggressive therapies and cytotoxic treatments  His side effects vs decompensation in his overall illness was so severe he was admitted to [address] [place] [address] in march 2021    Since that time, he has recovered some function " "with careful management of his cardiac disease, and less aggressive, less toxic cancer cares  At this time, his treatment plan involves Xofigo for bone-avid disease, +/- Lupron  We note that the patient has demonstrated some disease progression on other hormonal blockers, but that this plan of care is being offered, if not rec'd, by Dr Lyle Gutierrez  Pt presents to our office with suboptimally controlled pain, ongoing wt loss, poor appetite, and general malaise  His concerns are more related to his back and R foot pain; a Podiatrist today advised him that his unilateral neuropathic symptoms 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        Today, he can't quite recall his medications, he just insists that he needs 'something stronger than tynlenol'  When we pointed out that he has used tramadol and oxyIR recently, he also stated that these medications were not helpful  Pt endorses support from his son, who will drive pt to appts and on other errands  Pt can't name his meds, but has some vague understanding of the indications for his meds  \"    Past medical, surgical, social, and family histories are reviewed and pertinent updates are made  Review of Systems   Unable to perform ROS: dementia         Vital Signs    /68 (BP Location: Left arm, Patient Position: Sitting, Cuff Size: Standard)   Pulse 82   Temp (!) 97 °F (36 1 °C) (Temporal)   Wt 81 9 kg (180 lb 8 9 oz)   SpO2 99%   BMI 27 45 kg/m²     Physical Exam and Objective Data  Physical Exam  Constitutional:       General: He is not in acute distress  Appearance: He is ill-appearing  He is not toxic-appearing or diaphoretic  Comments: Frail   HENT:      Head: Normocephalic and atraumatic  Right Ear: External ear normal       Left Ear: External ear normal    Eyes:      General:         Right eye: No discharge  Left eye: No discharge        Conjunctiva/sclera: Conjunctivae normal       Pupils: Pupils are equal, round, and reactive " to light  Neck:      Trachea: No tracheal deviation  Cardiovascular:      Rate and Rhythm: Regular rhythm  Tachycardia present  Pulmonary:      Effort: Pulmonary effort is normal  No respiratory distress  Breath sounds: No stridor  Abdominal:      Palpations: Abdomen is soft  Comments: scaphoid   Musculoskeletal:      Comments: r foot not examined today; it is in a walking boot  Generalized muscle wasting is present on cursory exam   Cheeks and temples are hollowing  Skin:     General: Skin is warm and dry  Findings: No erythema or rash  Neurological:      General: No focal deficit present  Mental Status: Mental status is at baseline  He is disoriented  Gait: Gait abnormal (using walker)  Radiology and Laboratory:  I personally reviewed and interpreted the following results: none new    40+ minutes was spent face to face with Royce Rene  with greater than 50% of the time spent in counseling or coordination of care including: chart review; symptom pursuit; supportive listening; anticipatory guidance  ; consideration of home safety in this frail and vulnerable elder  Consideration of interaction with Adult Protective Services today, given missed appointments and marked wt loss, indicative either of starvation or rampant uncontrolled cancer  All of the patient's or agent's questions were answered during this discussion

## 2023-05-12 ENCOUNTER — TELEPHONE (OUTPATIENT)
Dept: INTERNAL MEDICINE CLINIC | Facility: CLINIC | Age: 78
End: 2023-05-12

## 2023-05-12 NOTE — TELEPHONE ENCOUNTER
Good morning  My name is Shaquille Chandra  I'm a nurse with [address]  I'm calling In regards to a Katarzyna Sachs free date of birth is [birth year]  I think he's a patient of Doctor Jelani Rodríguez  We're having some difficulty trying to figure out who his family doctor is and he just showed this to me to me today  Patient came from rehab, had Cellulitis of right of his foot  He has a right toe wound  He went to hospital, then rehab, then home  The rehab doctor initially signed for home health on 4/22, but now I wanted to change the wound care and I've been trying to find his family doctor to deal with that  My phone number is [phone]  Again [phone]  Please give me a call back  Thank you      I called and had to leave message for herman to call back Home

## 2023-05-15 ENCOUNTER — APPOINTMENT (OUTPATIENT)
Dept: LAB | Facility: CLINIC | Age: 78
End: 2023-05-15

## 2023-05-15 ENCOUNTER — IN-CLINIC DEVICE VISIT (OUTPATIENT)
Dept: CARDIOLOGY CLINIC | Facility: CLINIC | Age: 78
End: 2023-05-15

## 2023-05-15 DIAGNOSIS — Z95.810 BIVENTRICULAR ICD (IMPLANTABLE CARDIOVERTER-DEFIBRILLATOR) IN PLACE: Primary | ICD-10-CM

## 2023-05-15 NOTE — PROGRESS NOTES
Results for orders placed or performed in visit on 05/15/23   Cardiac EP device report    Narrative    [facility] BI-V ICD -ACTIVE SYSTEM IS MRI CONDITIONAL  DEVICE INTERROGATED IN THE [place] OFFICE: BATTERY VOLTAGE ADEQUATE (4 7 YRS); AP 56% BVP 98%; ALL LEAD PARAMETERS WITHIN NORMAL LIMITS  SINCE 1/12/23 REMOTE:  NO SIGNIFICANT HIGH RATE EPISODES  9 AMS EPISODE WITH MAX DURATION 8 SECS, EGM'S SHOWING PAF & PAT; AT/AF BURDEN <1%; HX: SAME & PATIENT TAKESTAKES ASA 81, CLOPIDOGREL, METOPROLOL SUCC; CORVUE IMPEDANCE MONITORING WITHIN NORMAL LIMITS  NO PROGRAMMING CHANGES MADE TO DEVICE PARAMETERS  APPROPRIATELY FUNCTIONING BI-V ICD     ES

## 2023-05-16 DIAGNOSIS — M54.50 CHRONIC MIDLINE LOW BACK PAIN: ICD-10-CM

## 2023-05-16 DIAGNOSIS — G89.29 CHRONIC MIDLINE LOW BACK PAIN: ICD-10-CM

## 2023-05-16 RX ORDER — LANCING DEVICE
EACH MISCELLANEOUS
Qty: 1 EACH | OUTPATIENT
Start: 2023-05-16

## 2023-05-16 RX ORDER — METHOCARBAMOL 500 MG/1
500 TABLET, FILM COATED ORAL 3 TIMES DAILY
Qty: 90 TABLET | Refills: 0 | Status: SHIPPED | OUTPATIENT
Start: 2023-05-16

## 2023-05-17 ENCOUNTER — TELEPHONE (OUTPATIENT)
Dept: INTERNAL MEDICINE CLINIC | Facility: CLINIC | Age: 78
End: 2023-05-17

## 2023-05-17 DIAGNOSIS — E87.6 HYPOKALEMIA: Primary | ICD-10-CM

## 2023-05-17 DIAGNOSIS — I50.42 CHRONIC COMBINED SYSTOLIC AND DIASTOLIC CHF (CONGESTIVE HEART FAILURE) (HCC): ICD-10-CM

## 2023-05-17 RX ORDER — POTASSIUM CHLORIDE 20 MEQ/1
20 TABLET, EXTENDED RELEASE ORAL DAILY
Qty: 30 TABLET | Refills: 2 | Status: SHIPPED | OUTPATIENT
Start: 2023-05-17 | End: 2023-08-15

## 2023-05-17 NOTE — TELEPHONE ENCOUNTER
I called to mary yoon to inform him of hte change needed in his medication to include taking KCL as prescribed by Dr Marie  I also sent a my chart message

## 2023-05-17 NOTE — TELEPHONE ENCOUNTER
----- Message from Johanna Do MD sent at 5/17/2023  9:39 AM EDT -----  Potassium is low at 2 9  Can you please inform the patient to start taking potassium supplementation again  I have prescribed kdur 20meq to be taken once daily  We can recheck levels again in a few weeks when he has follow-up with our office  Thanks,    Dr Silas De Oliveira  ----- Message -----  From: Jean Vega RN  Sent: 5/16/2023   5:10 PM EDT  To: [facility] [place] [facility] Provider      ----- Message -----  From: Pricilla Carnes RN  Sent: 5/16/2023   1:09 PM EDT  To: [facility], #    Patient has been a no show to our office 2 times  Labs came back with hypokalemia   ----- Message -----  From: Salvador Johnson RN  Sent: 5/16/2023  11:22 AM EDT  To: Pricilla Carnes RN    Can you please assist me in making PCP or palliative care aware of this patient?  He has been a no show twice for us  ----- Message -----  From: Mayra London MD  Sent: 5/16/2023   9:10 AM EDT  To: Salvador Johnson RN    [facility]    ----- Message -----  From: Lab, Background User  Sent: 5/15/2023  11:13 AM EDT  To: Lydia Hardy MD

## 2023-05-18 ENCOUNTER — HOSPITAL ENCOUNTER (OUTPATIENT)
Dept: NUCLEAR MEDICINE | Facility: HOSPITAL | Age: 78
End: 2023-05-18

## 2023-05-18 DIAGNOSIS — C61 MALIGNANT NEOPLASM OF PROSTATE (HCC): ICD-10-CM

## 2023-05-19 ENCOUNTER — HOSPITAL ENCOUNTER (OUTPATIENT)
Dept: NUCLEAR MEDICINE | Facility: HOSPITAL | Age: 78
Discharge: HOME | End: 2023-05-19

## 2023-05-19 DIAGNOSIS — C61 MALIGNANT NEOPLASM OF PROSTATE (HCC): ICD-10-CM

## 2023-05-24 ENCOUNTER — HOSPITAL ENCOUNTER (OUTPATIENT)
Dept: INFUSION CENTER | Facility: CLINIC | Age: 78
Discharge: HOME | End: 2023-05-24

## 2023-05-24 ENCOUNTER — TELEPHONE (OUTPATIENT)
Dept: INTERNAL MEDICINE CLINIC | Facility: CLINIC | Age: 78
End: 2023-05-24

## 2023-05-24 DIAGNOSIS — Z95.828 PORT-A-CATH IN PLACE: Primary | ICD-10-CM

## 2023-05-24 NOTE — TELEPHONE ENCOUNTER
I have called to the patient last week and again on Monday 5/22 no answer   It appears to me the patient is going to the infusion center   Will call to them and verify with pharmacy if patient received a supplement

## 2023-05-24 NOTE — TELEPHONE ENCOUNTER
----- Message from Mardene Cooks, MD sent at 5/17/2023  9:39 AM EDT -----  Potassium is low at 2 9  Can you please inform the patient to start taking potassium supplementation again  I have prescribed kdur 20meq to be taken once daily  We can recheck levels again in a few weeks when he has follow-up with our office  Thanks,    Dr Patt Vences  ----- Message -----  From: Camilo Smith RN  Sent: 5/16/2023   5:10 PM EDT  To: [facility] Provider      ----- Message -----  From: Rafaela Beasley RN  Sent: 5/16/2023   1:09 PM EDT  To: [facility], #    Patient has been a no show to our office 2 times  Labs came back with hypokalemia   ----- Message -----  From: Reginaldo Pathak RN  Sent: 5/16/2023  11:22 AM EDT  To: Rafaela Beasley RN    Can you please assist me in making PCP or palliative care aware of this patient?  He has been a no show twice for us  ----- Message -----  From: Herrera Le MD  Sent: 5/16/2023   9:10 AM EDT  To: Reginaldo Pathak RN    [facility]    ----- Message -----  From: Lab, Background User  Sent: 5/15/2023  11:13 AM EDT  To: Conor Bahena MD

## 2023-05-29 ENCOUNTER — PATIENT OUTREACH (OUTPATIENT)
Dept: CASE MANAGEMENT | Facility: HOSPITAL | Age: 78
End: 2023-05-29

## 2023-05-30 DIAGNOSIS — I73.9 PAD (PERIPHERAL ARTERY DISEASE) (HCC): ICD-10-CM

## 2023-05-30 RX ORDER — GABAPENTIN 300 MG/1
600 CAPSULE ORAL 2 TIMES DAILY
Qty: 120 CAPSULE | Refills: 0 | Status: SHIPPED | OUTPATIENT
Start: 2023-05-30

## 2023-06-02 ENCOUNTER — TELEPHONE (OUTPATIENT)
Dept: INTERNAL MEDICINE CLINIC | Facility: CLINIC | Age: 78
End: 2023-06-02

## 2023-06-05 DIAGNOSIS — C61 PROSTATE CANCER (HCC): Primary | ICD-10-CM

## 2023-06-08 ENCOUNTER — TELEPHONE (OUTPATIENT)
Dept: PALLIATIVE MEDICINE | Facility: CLINIC | Age: 78
End: 2023-06-08

## 2023-06-08 ENCOUNTER — OFFICE VISIT (OUTPATIENT)
Dept: PALLIATIVE MEDICINE | Facility: CLINIC | Age: 78
End: 2023-06-08

## 2023-06-08 VITALS — TEMPERATURE: 97.3 F | BODY MASS INDEX: 27.49 KG/M2 | HEART RATE: 82 BPM | OXYGEN SATURATION: 98 % | WEIGHT: 180.78 LBS

## 2023-06-08 DIAGNOSIS — Z59.9 FINANCIAL PROBLEMS: Primary | Chronic | ICD-10-CM

## 2023-06-08 DIAGNOSIS — R26.2 AMBULATORY DYSFUNCTION: Chronic | ICD-10-CM

## 2023-06-08 DIAGNOSIS — Z51.5 PALLIATIVE CARE PATIENT: Chronic | ICD-10-CM

## 2023-06-08 SDOH — ECONOMIC STABILITY - INCOME SECURITY: PROBLEM RELATED TO HOUSING AND ECONOMIC CIRCUMSTANCES, UNSPECIFIED: Z59.9

## 2023-06-08 NOTE — TELEPHONE ENCOUNTER
Received message from [address] [place] [address] asking palliative to call back  Return call  Per pharmacist , wanted to inform Palliative that oxycodone was delivered topt home on 5/9 and again today 6/8  Pt must sign when meds delivered  This nurse called pt to verify he received medications  Pt verbalized Oxycodone delivered today  Offered no other questions or concerns
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
## 2023-06-08 NOTE — TELEPHONE ENCOUNTER
[id number]  called office regarding Provider's inquiry for medication report  They wanted to ask if primary Palliative Care provider would consider signing [address] orders as they're having trouble securing a PCP or attending physician to do so  [facility] says they'd have to d/c from their service line if they're unable to secure an attending physician

## 2023-06-12 ENCOUNTER — OFFICE VISIT (OUTPATIENT)
Dept: INTERNAL MEDICINE CLINIC | Facility: CLINIC | Age: 78
End: 2023-06-12
Payer: COMMERCIAL

## 2023-06-12 VITALS
TEMPERATURE: 98.8 F | DIASTOLIC BLOOD PRESSURE: 60 MMHG | SYSTOLIC BLOOD PRESSURE: 100 MMHG | BODY MASS INDEX: 28.88 KG/M2 | HEIGHT: 67 IN | WEIGHT: 184 LBS | OXYGEN SATURATION: 100 % | HEART RATE: 84 BPM

## 2023-06-12 DIAGNOSIS — G89.3 CANCER-RELATED PAIN: Chronic | ICD-10-CM

## 2023-06-12 DIAGNOSIS — L08.9 DIABETIC FOOT INFECTION (HCC): ICD-10-CM

## 2023-06-12 DIAGNOSIS — K59.1 FUNCTIONAL DIARRHEA: ICD-10-CM

## 2023-06-12 DIAGNOSIS — E87.6 HYPOKALEMIA: Primary | ICD-10-CM

## 2023-06-12 DIAGNOSIS — E11.628 DIABETIC FOOT INFECTION (HCC): ICD-10-CM

## 2023-06-12 PROCEDURE — 99213 OFFICE O/P EST LOW 20 MIN: CPT

## 2023-06-12 NOTE — PROGRESS NOTES
Name: Dora Santana      :       MRN: [id number]  Encounter Provider: Maame Mendieta MD  Encounter Date: 2023   Encounter department: [facility] INTERNAL MEDICINE [place]    Assessment & Plan     1  Hypokalemia  Assessment & Plan:  The patient had hypokalemia on prior lab work from 5/15/23 of 2 9  He is on lasix 20 mg daily, which he believes he does take (his pharmacy sorts his medicines into daily packets, and his home med list he brings does list lasix as prescribed  )  He is also on 20 mEq potassium chloride daily  -BMP ordered, patient states he will have it drawn tomorrow    -Follow-up BMP and consider increasing his potassium supplementation  Orders:  -     Basic metabolic panel; Future    2  Functional diarrhea  Assessment & Plan:  The patient states that he has diarrhea associated with his chemotherapy treatments, though he is not currently having diarrhea  He states that his imodium has been adequately treating his diarrhea when it does occur  He also has mild abdomenal pain when he has diarrhea, and feels that famotidine resolves these symptoms   -Continuing PRN imodium 2 mg 4 times daily   -Continuing famotidine 20 mg daily  3  Diabetic foot infection ([address] )  Assessment & Plan:  The patient has a right lateral great toe wound which he reports started as an ingrown toenail with infection 2 years ago, with poor healing due to vascular compromise of the RLE  He feels that it has been healing and growing smaller with his current home visiting nurse wound care, which he states has been occurring every other day  The wound has minor slough visible without expressible pus or surrounding erythema    -Continue home wound care with his visiting nurse    -Discussed wound center referral for more intensive treatment, however he declined at this time as he felt that the wound was closing well with his current treatment   We can consider referral in the future if the wound persists at follow-up  4  Cancer-related pain  Assessment & Plan:  The patient reports sacral/low back pain in association with his cancer  He currently takes 5 mg oxycodone every 6 hours, which he feels almost completely relieves his pain  However, if he misses a dose of this the pain returns  He will use acetaminophen PRN if the pain occurs while on the oxycodone, however this rarely occurs  -The patient will continue with his current oxycodone with PRN tylenol regimen           Subjective      The patient returns to the office for a follow up office visit  He states that he is currently feeling well, and has no acute concerns that he wants to be addressed this visit  He states that the imodium which he takes PRN for diarrhea has been effective at symptome control  In regards to his chronic cancer-related back pain, he feels that the current regimen of q6h oxycodone 5 mg nearly completely treats his pain, and he rarely needs to take PRN Tylenol on top of it  He also reports that he has a chronic slow healing right toe wound which he sees a visiting nurse for wound care for  He feels it is continuing to improve and states that his nurse told him the same  He has no associated pain with the right toe due to poor sensation of the right leg  He had been prescribed potassium due to having hypokalemia on outside labs  He is unsure of which specific medications he takes because his pharmacy arranges his medicines in daily packs  He takes all they give him, but cannot specifically confirm that he has been taking a potassium pill  Review of Systems   Constitutional: Negative for chills, fever and unexpected weight change  HENT: Negative for rhinorrhea and sore throat  Eyes: Positive for visual disturbance  Negative for pain  States he is blind in the right eye  Respiratory: Negative for cough and shortness of breath  Cardiovascular: Negative for chest pain and leg swelling     Gastrointestinal: Negative for abdominal pain and diarrhea  Genitourinary: Negative for dysuria and urgency  Musculoskeletal: Negative for arthralgias and back pain  Back pain is only present when he misses his oxycodone dose  Skin: Positive for wound  Negative for color change and rash  Right toe, medial nail bed  Neurological: Negative for weakness and headaches  Psychiatric/Behavioral: Negative for agitation and behavioral problems  All other systems reviewed and are negative  Current Outpatient Medications on File Prior to Visit   Medication Sig   • abiraterone (ZYTIGA) 250 mg tablet Take 4 tablets (1,000 mg total) by mouth once daily   • acetaminophen (TYLENOL) 500 mg tablet Take 2 tablets (1,000 mg total) by mouth 3 (three) times a day as needed for mild pain   • aspirin 81 mg chewable tablet Chew 1 tablet (81 mg total) daily   • atorvastatin (LIPITOR) 80 mg tablet Take 1 tablet (80 mg total) by mouth daily with dinner   • cetirizine (ZyrTEC) 5 MG tablet Take 1 tablet (5 mg total) by mouth daily   • clopidogrel (PLAVIX) 75 mg tablet Take 1 tablet (75 mg total) by mouth daily   • Empagliflozin (JARDIANCE) 10 MG TABS tablet Take 1 tablet (10 mg total) by mouth every morning   • famotidine (PEPCID) 20 mg tablet Take 1 tablet (20 mg total) by mouth daily   • ferrous sulfate 325 (65 Fe) mg tablet Take 1 tablet (325 mg total) by mouth 2 (two) times a day with meals   • furosemide (LASIX) 20 mg tablet Take 1 tablet (20 mg total) by mouth daily   • gabapentin (NEURONTIN) 300 mg capsule TAKE 2 CAPSULES (600 MG TOTAL) BY MOUTH 2 (TWO) TIMES A DAY TO REDUCE NERVE PAIN IN FEET  DO NOT START BEFORE MAY 16, 2023     • loperamide (IMODIUM) 2 mg capsule Take 1 capsule (2 mg total) by mouth 4 (four) times a day as needed for diarrhea   • losartan (COZAAR) 25 mg tablet Take 0 5 tablets (12 5 mg total) by mouth daily   • methocarbamol (ROBAXIN) 500 mg tablet TAKE 1 TABLET (500 MG TOTAL) BY MOUTH 3 (THREE) TIMES A DAY   • "metoprolol succinate (TOPROL-XL) 25 mg 24 hr tablet 1/2 tablet daily   • ondansetron (ZOFRAN) 4 mg tablet Take 1 tablet (4 mg total) by mouth every 8 (eight) hours as needed for nausea or vomiting   • oxyCODONE (ROXICODONE) 5 immediate release tablet Take 1 tablet (5 mg total) by mouth every 6 (six) hours as needed for moderate pain Max Daily Amount: 20 mg Do not start before May 29, 2023  • potassium chloride (K-DUR,KLOR-CON) 20 mEq tablet Take 1 tablet (20 mEq total) by mouth daily   • senna (SENOKOT) 8 6 MG tablet Take 1 tablet (8 6 mg total) by mouth daily at bedtime To reduce constipation related to pain meds  • tamsulosin (FLOMAX) 0 4 mg Take 1 capsule (0 4 mg total) by mouth daily with dinner       Objective     /60 (BP Location: Left arm, Patient Position: Sitting, Cuff Size: Large)   Pulse 84   Temp 98 8 °F (37 1 °C) (Tympanic)   Ht 5' 7\" (1 702 m)   Wt 83 5 kg (184 lb)   SpO2 100%   BMI 28 82 kg/m²      Physical Exam  Vitals and nursing note reviewed  Constitutional:       General: He is not in acute distress  Appearance: He is well-developed  HENT:      Head: Normocephalic and atraumatic  Right Ear: External ear normal       Left Ear: External ear normal       Nose: Nose normal       Mouth/Throat:      Mouth: Mucous membranes are moist    Eyes:      General: No scleral icterus  Conjunctiva/sclera: Conjunctivae normal       Pupils: Pupils are equal, round, and reactive to light  Cardiovascular:      Rate and Rhythm: Normal rate and regular rhythm  Heart sounds: No murmur heard  Pulmonary:      Effort: Pulmonary effort is normal  No respiratory distress  Breath sounds: Normal breath sounds  No wheezing or rales  Abdominal:      General: Abdomen is flat  There is no distension  Palpations: Abdomen is soft  Tenderness: There is no abdominal tenderness  There is no guarding  Musculoskeletal:         General: No swelling        Cervical back: Neck " supple  No tenderness  Right lower leg: No edema  Left lower leg: No edema  Comments: No tenderness to palpation of the lower back or sacrum, no perispinal tenderness  Skin:     General: Skin is warm and dry  Capillary Refill: Capillary refill takes less than 2 seconds  Coloration: Skin is pale  Skin is not jaundiced  Comments: Right great toe has the nail re-growing from the nail bed, apparently recently removed  The mid-medial nail bed has white slough, with surrounding granulation tissue including the medial nail bed, where a healing wound is  There is no expressible puss or surrounding erythema  Neurological:      Mental Status: He is alert and oriented to person, place, and time     Psychiatric:         Mood and Affect: Mood normal          Behavior: Behavior normal          Christianne Almendarez MD

## 2023-06-12 NOTE — ASSESSMENT & PLAN NOTE
The patient reports sacral/low back pain in association with his cancer  He currently takes 5 mg oxycodone every 6 hours, which he feels almost completely relieves his pain  However, if he misses a dose of this the pain returns  He will use acetaminophen PRN if the pain occurs while on the oxycodone, however this rarely occurs     -The patient will continue with his current oxycodone with PRN tylenol regimen

## 2023-06-12 NOTE — ASSESSMENT & PLAN NOTE
The patient has a right lateral great toe wound which he reports started as an ingrown toenail with infection 2 years ago, with poor healing due to vascular compromise of the RLE  He feels that it has been healing and growing smaller with his current home visiting nurse wound care, which he states has been occurring every other day  The wound has minor slough visible without expressible pus or surrounding erythema    -Continue home wound care with his visiting nurse    -Discussed wound center referral for more intensive treatment, however he declined at this time as he felt that the wound was closing well with his current treatment  We can consider referral in the future if the wound persists at follow-up

## 2023-06-12 NOTE — ASSESSMENT & PLAN NOTE
The patient states that he has diarrhea associated with his chemotherapy treatments, though he is not currently having diarrhea  He states that his imodium has been adequately treating his diarrhea when it does occur  He also has mild abdomenal pain when he has diarrhea, and feels that famotidine resolves these symptoms   -Continuing PRN imodium 2 mg 4 times daily   -Continuing famotidine 20 mg daily

## 2023-06-12 NOTE — ASSESSMENT & PLAN NOTE
The patient had hypokalemia on prior lab work from 5/15/23 of 2 9  He is on lasix 20 mg daily, which he believes he does take (his pharmacy sorts his medicines into daily packets, and his home med list he brings does list lasix as prescribed  )  He is also on 20 mEq potassium chloride daily  -BMP ordered, patient states he will have it drawn tomorrow    -Follow-up BMP and consider increasing his potassium supplementation

## 2023-06-13 ENCOUNTER — TELEPHONE (OUTPATIENT)
Dept: HEMATOLOGY ONCOLOGY | Facility: CLINIC | Age: 78
End: 2023-06-13

## 2023-06-14 ENCOUNTER — APPOINTMENT (OUTPATIENT)
Dept: LAB | Facility: AMBULARY SURGERY CENTER | Age: 78
End: 2023-06-14
Payer: COMMERCIAL

## 2023-06-14 DIAGNOSIS — E87.6 HYPOKALEMIA: ICD-10-CM

## 2023-06-14 LAB
ANION GAP SERPL CALCULATED.3IONS-SCNC: 1 MMOL/L (ref 4–13)
BUN SERPL-MCNC: 13 MG/DL (ref 5–25)
CALCIUM SERPL-MCNC: 8.6 MG/DL (ref 8.3–10.1)
CHLORIDE SERPL-SCNC: 117 MMOL/L (ref 96–108)
CO2 SERPL-SCNC: 23 MMOL/L (ref 21–32)
CREAT SERPL-MCNC: 0.82 MG/DL (ref 0.6–1.3)
GFR SERPL CREATININE-BSD FRML MDRD: 85 ML/MIN/1.73SQ M
GLUCOSE SERPL-MCNC: 102 MG/DL (ref 65–140)
POTASSIUM SERPL-SCNC: 3.9 MMOL/L (ref 3.5–5.3)
SODIUM SERPL-SCNC: 141 MMOL/L (ref 135–147)

## 2023-06-14 PROCEDURE — 80048 BASIC METABOLIC PNL TOTAL CA: CPT

## 2023-06-14 PROCEDURE — 36415 COLL VENOUS BLD VENIPUNCTURE: CPT

## 2023-06-15 ENCOUNTER — OFFICE VISIT (OUTPATIENT)
Dept: HEMATOLOGY ONCOLOGY | Facility: CLINIC | Age: 78
End: 2023-06-15
Payer: COMMERCIAL

## 2023-06-15 ENCOUNTER — TELEPHONE (OUTPATIENT)
Dept: HEMATOLOGY ONCOLOGY | Facility: CLINIC | Age: 78
End: 2023-06-15

## 2023-06-15 VITALS
HEIGHT: 67 IN | BODY MASS INDEX: 29.1 KG/M2 | WEIGHT: 185.4 LBS | HEART RATE: 88 BPM | TEMPERATURE: 97.8 F | OXYGEN SATURATION: 97 % | SYSTOLIC BLOOD PRESSURE: 116 MMHG | RESPIRATION RATE: 18 BRPM | DIASTOLIC BLOOD PRESSURE: 70 MMHG

## 2023-06-15 DIAGNOSIS — C61 PROSTATE CANCER (HCC): Primary | ICD-10-CM

## 2023-06-15 DIAGNOSIS — C79.51 MALIGNANT NEOPLASM METASTATIC TO BONE (HCC): ICD-10-CM

## 2023-06-15 PROCEDURE — 99214 OFFICE O/P EST MOD 30 MIN: CPT | Performed by: INTERNAL MEDICINE

## 2023-06-15 RX ORDER — PREDNISONE 5 MG/1
5 TABLET ORAL 2 TIMES DAILY WITH MEALS
Qty: 60 TABLET | Refills: 5 | Status: SHIPPED | OUTPATIENT
Start: 2023-06-15

## 2023-06-15 NOTE — PROGRESS NOTES
Hematology Outpatient Follow - Up Note  Candido Labor Free 68 y o  male MRN: @ Encounter: [id number]        Date:  6/15/2023        Assessment/ Plan:    #1  Prostate cancer with bone metastasis Francisco Javier score 7 diagnosed on 10/2021 previously treated in the outside institution status post external beam radiation therapy 10/2003-12/2003 he received neoadjuvant and adjuvant [facility] for 9 months after radiation therapy observed until July 2008 with a rising PSA and bony metastasis initiated on Lupron with disease progression in 2017 enzalutamide was added he received palliative radiation therapy to the sacrum completed in February 2019 and enzalutamide was added with disease progression in December 2019 he received docetaxel in March 2020 for a year with progression of disease    Currently on  first dose on May 13, 2023 continue with the treatment every 6 weeks    Continue Xgeva every 3 months    His medical record showed that he is on abiraterone? Continue with prednisone 5 mg p o  twice daily    Multiple medical problems including heart failure, peripheral vascular disease    He is on palliative care    We will check PSA in 6 weeks        Labs and imaging studies are reviewed by ordering provider once results are available  If there are findings that need immediate attention, you will be contacted when results available  Discussing results and the implication on your healthcare is best discussed in person at your follow-up visit  HPI:    68year old male with multiple medical conditions including a significant cardiovascular history, heart failure, PAD  He has [address] [place] [address] Oncology since 10/2021 for stage IV prostate cancer  He was previously treated at an outside institution   Per review of his EMR, he was initially diagnosed with a [place] 7 prostate cancer s/p treatment with external beam radiation from 10/2003 to 12/2003   He also got neoadjuvant and adjuvant [facility] for 9 months after radiation   He was observed until July of 2008 when he was noted to have rising PSA and imaging done at that time showed new bony lesions   He was restarted on Lupron   He had evidence of disease progression on imaging completed in June of 2017  Enzalutamide was added to his regimen   His PSA continued to rise and he had progressive bony metastases including to the sacrum for which he received a course of palliative radiation, which was completed in February of 2019  He was then restarted on enzalutamide but a scan in December of 2019 showed worsening bone metastases so he was started on docetaxel in March of 2020  He was kept on this for approximately a year with his PSA slowly rising  Olivier Mckinney was admitted to a hospital for cardiac issues and then was lost to follow up with his previous oncologist     Establish care with Dr Jennifer Mathias initiated on Lupron every 3 months, he received good LV go from 11/2021-4/2022 rechallenged with enzalutamide and Darwin [place] was added for bone metastasis in August 2022    He received Pluvicto the first dose on May 18  2023  Interval History:        Previous Treatment:         Test Results:    Imaging: NM tumor loc spect/ct multi areas/days    Result Date: 5/24/2023  Narrative: PLUVICTO THERAPY SPECT CT INDICATION: C61: Malignant neoplasm of prostate  Status post Pluvicto therapy  COMPARISON: PET CT SCAN 7/25/2022 TECHNIQUE: The study was performed approximately 24 hours following the intravenous administration of  191 7 mCi Lutetium 177 Pluvicto  Whole body planar images were obtained in the anterior posterior projection  SPECT CT images were obtained of the chest abdomen pelvis and reconstructed in the axial, sagittal and coronal planes  FINDINGS: Multiple foci of increased radiotracer uptake noted in the osseous structures largely corresponding to previously seen PSMA PET activity   Overall osseous activity more may be slightly more extensive in the spine and right hemisacrum compared to the prior  PET/CT  There is also linear radiotracer uptake at the right mid femoral shaft region suspicious for metastasis, not within the field-of-view on prior PET  There is physiologic genitourinary, splenic and liver activity  Low-dose CT demonstrates left-sided ICD/pacemaker  There is a right-sided Mediport line tip terminating at the SVC  Calcified granuloma in the spleen  Impression: 1  Post therapy SPECT-CT demonstrates multiple foci of increased radiotracer uptake in the osseous structures compatible with successful targeting of metastatic disease  Workstation performed: [id number]     NM pluvicto infusion    Result Date: 5/18/2023  Narrative: PLUVICTO THERAPY INDICATION:  Metastatic prostate cancer  RADIOPHARMACEUTICAL: 191 7 mCi Lutetium 177 PLUVICTO COMPARISON:  PET/CT 7/25/2022 FINDINGS: Patient is a 73year old male with history of  metastatic prostate cancer  Laboratory blood work was drawn on 5/15/2023: Hemoglobin 9 7  Platelets 865  WBC 5 71  Creatinine 0 71  eGFR 90  Albumin 3 7  The benefits, risks and alternatives of this therapy were explained  Written informed consent was obtained  The patient was also given written and oral instructions regarding posttherapy care  191 7 mCi Lutetium 177 Pluvicto was administered intravenously without incident  Impression: 191 7 mCi Lutetium 177 Pluvicto was administered intravenously without immediate complication   Workstation performed: [id number]       Labs:   Lab Results   Component Value Date    HCT 32 2 (L) 05/15/2023    HGB 9 7 (L) 05/15/2023    MCV 88 05/15/2023     (H) 05/15/2023    WBC 5 71 05/15/2023     Lab Results   Component Value Date    ALKPHOS 123 (H) 05/15/2023    ALT 7 05/15/2023    AST 9 (L) 05/15/2023    BUN 13 06/14/2023    CALCIUM 8 6 06/14/2023     (H) 06/14/2023    CO2 23 06/14/2023    CORRECTEDCA 8 2 (L) 04/10/2023    CREATININE 0 82 06/14/2023    EGFR 85 06/14/2023    GLUF 111 (H) 05/15/2023    K "3 9 06/14/2023       No results found for: \"FERRITIN\", \"IRON\", \"TIBC\"    No results found for: \"GLJVPPCE57\"      ROS: Review of Systems   Constitutional: Negative  Negative for appetite change, chills, diaphoresis, fatigue, fever and unexpected weight change  HENT:   Negative for hearing loss, lump/mass, mouth sores, nosebleeds, sore throat, trouble swallowing and voice change  Eyes: Negative  Negative for eye problems and icterus  Respiratory: Positive for cough and shortness of breath  Negative for chest tightness and hemoptysis  Cardiovascular: Negative for chest pain and leg swelling  Gastrointestinal: Negative for abdominal pain, blood in stool, constipation, diarrhea and nausea  Endocrine: Negative  Genitourinary: Negative for dysuria, frequency, hematuria and pelvic pain  Musculoskeletal: Positive for arthralgias, back pain and myalgias  Negative for flank pain, gait problem and neck stiffness  Skin: Negative for itching and rash  Neurological: Negative for dizziness, gait problem, headaches, light-headedness, numbness and speech difficulty  Hematological: Negative for adenopathy  Does not bruise/bleed easily  Psychiatric/Behavioral: Negative for confusion, decreased concentration, depression and sleep disturbance  The patient is not nervous/anxious  Current Medications: Reviewed  Allergies: Reviewed  PMH/FH/SH:  Reviewed      Physical Exam:    Body surface area is 1 96 meters squared      Wt Readings from Last 3 Encounters:   06/15/23 84 1 kg (185 lb 6 4 oz)   06/12/23 83 5 kg (184 lb)   06/08/23 82 kg (180 lb 12 4 oz)        Temp Readings from Last 3 Encounters:   06/15/23 97 8 °F (36 6 °C) (Temporal)   06/12/23 98 8 °F (37 1 °C) (Tympanic)   06/08/23 (!) 97 3 °F (36 3 °C) (Temporal)        BP Readings from Last 3 Encounters:   06/15/23 116/70   06/12/23 100/60   05/11/23 122/68         Pulse Readings from Last 3 Encounters:   06/15/23 88   06/12/23 84   06/08/23 82    " Physical Exam  Vitals reviewed  Constitutional:       General: He is not in acute distress  Appearance: He is well-developed  He is not diaphoretic  Comments: Use cane for ambulation   HENT:      Head: Normocephalic and atraumatic  Eyes:      Conjunctiva/sclera: Conjunctivae normal    Neck:      Trachea: No tracheal deviation  Cardiovascular:      Rate and Rhythm: Normal rate and regular rhythm  Heart sounds: Murmur heard  No friction rub  No gallop  Pulmonary:      Effort: Pulmonary effort is normal  No respiratory distress  Breath sounds: Normal breath sounds  No wheezing or rales  Chest:      Chest wall: No tenderness  Abdominal:      General: There is no distension  Palpations: Abdomen is soft  Tenderness: There is no abdominal tenderness  Musculoskeletal:      Cervical back: Normal range of motion and neck supple  Right lower leg: Edema present  Left lower leg: Edema present  Lymphadenopathy:      Cervical: No cervical adenopathy  Skin:     General: Skin is warm and dry  Coloration: Skin is not pale  Findings: No erythema  Neurological:      Mental Status: He is alert and oriented to person, place, and time  Psychiatric:         Behavior: Behavior normal          Thought Content: Thought content normal          Judgment: Judgment normal          ECO  Goals and Barriers:  Current Goal: Minimize effects of disease  Barriers: None  Patient's Capacity to Self Care:  Patient is able to self care      Code Status: [unfilled]

## 2023-06-16 ENCOUNTER — TELEPHONE (OUTPATIENT)
Dept: HEMATOLOGY ONCOLOGY | Facility: CLINIC | Age: 78
End: 2023-06-16

## 2023-06-16 NOTE — TELEPHONE ENCOUNTER
Received request from office to follow pt for Zytiga/Prednisone compliance  [facility]  Per Erna it was scheduled for delivery 5/26  Prednisone script filled through [facility]  After some initial confusion pt confirmed he has both meds and is taking 4 Zytiga tablets in the morning  We reviewed taking 1 Prednisone tablet with breakfast and 1 with dinner every day  Pt's meds are provided in a blister pack by the pharmacy, however Prednisone is in a bottle per pt  I spoke with [facility] and confirmed Prednisone was delivered yesterday  Pt agreeable to follow up call next week

## 2023-06-19 DIAGNOSIS — C79.51 MALIGNANT NEOPLASM METASTATIC TO BONE (HCC): Primary | ICD-10-CM

## 2023-06-21 ENCOUNTER — HOSPITAL ENCOUNTER (OUTPATIENT)
Dept: INFUSION CENTER | Facility: CLINIC | Age: 78
Discharge: HOME | End: 2023-06-21
Payer: COMMERCIAL

## 2023-06-21 DIAGNOSIS — C61 PROSTATE CANCER (HCC): ICD-10-CM

## 2023-06-21 DIAGNOSIS — C79.51 MALIGNANT NEOPLASM METASTATIC TO BONE (HCC): ICD-10-CM

## 2023-06-21 DIAGNOSIS — Z95.828 PORT-A-CATH IN PLACE: Primary | ICD-10-CM

## 2023-06-21 LAB
ALBUMIN SERPL BCP-MCNC: 4.2 G/DL (ref 3.5–5)
ALP SERPL-CCNC: 125 U/L (ref 34–104)
ALT SERPL W P-5'-P-CCNC: 9 U/L (ref 7–52)
ANION GAP SERPL CALCULATED.3IONS-SCNC: 6 MMOL/L
AST SERPL W P-5'-P-CCNC: 8 U/L (ref 13–39)
BASOPHILS # BLD AUTO: 0.02 THOUSANDS/ÂΜL (ref 0–0.1)
BASOPHILS NFR BLD AUTO: 0 % (ref 0–1)
BILIRUB SERPL-MCNC: 0.72 MG/DL (ref 0.2–1)
BUN SERPL-MCNC: 20 MG/DL (ref 5–25)
CALCIUM SERPL-MCNC: 8.5 MG/DL (ref 8.4–10.2)
CHLORIDE SERPL-SCNC: 110 MMOL/L (ref 96–108)
CO2 SERPL-SCNC: 24 MMOL/L (ref 21–32)
CREAT SERPL-MCNC: 0.75 MG/DL (ref 0.6–1.3)
EOSINOPHIL # BLD AUTO: 0.17 THOUSAND/ÂΜL (ref 0–0.61)
EOSINOPHIL NFR BLD AUTO: 3 % (ref 0–6)
ERYTHROCYTE [DISTWIDTH] IN BLOOD BY AUTOMATED COUNT: 18.1 % (ref 11.6–15.1)
GFR SERPL CREATININE-BSD FRML MDRD: 88 ML/MIN/1.73SQ M
GLUCOSE SERPL-MCNC: 101 MG/DL (ref 65–140)
HCT VFR BLD AUTO: 35.4 % (ref 36.5–49.3)
HGB BLD-MCNC: 10.8 G/DL (ref 12–17)
IMM GRANULOCYTES # BLD AUTO: 0.06 THOUSAND/UL (ref 0–0.2)
IMM GRANULOCYTES NFR BLD AUTO: 1 % (ref 0–2)
LYMPHOCYTES # BLD AUTO: 1.06 THOUSANDS/ÂΜL (ref 0.6–4.47)
LYMPHOCYTES NFR BLD AUTO: 16 % (ref 14–44)
MCH RBC QN AUTO: 27.7 PG (ref 26.8–34.3)
MCHC RBC AUTO-ENTMCNC: 30.5 G/DL (ref 31.4–37.4)
MCV RBC AUTO: 91 FL (ref 82–98)
MONOCYTES # BLD AUTO: 0.51 THOUSAND/ÂΜL (ref 0.17–1.22)
MONOCYTES NFR BLD AUTO: 8 % (ref 4–12)
NEUTROPHILS # BLD AUTO: 4.98 THOUSANDS/ÂΜL (ref 1.85–7.62)
NEUTS SEG NFR BLD AUTO: 72 % (ref 43–75)
NRBC BLD AUTO-RTO: 0 /100 WBCS
PLATELET # BLD AUTO: 272 THOUSANDS/UL (ref 149–390)
PMV BLD AUTO: 9.9 FL (ref 8.9–12.7)
POTASSIUM SERPL-SCNC: 4.1 MMOL/L (ref 3.5–5.3)
PROT SERPL-MCNC: 6.5 G/DL (ref 6.4–8.4)
RBC # BLD AUTO: 3.9 MILLION/UL (ref 3.88–5.62)
SODIUM SERPL-SCNC: 140 MMOL/L (ref 135–147)
WBC # BLD AUTO: 6.8 THOUSAND/UL (ref 4.31–10.16)

## 2023-06-21 PROCEDURE — 96372 THER/PROPH/DIAG INJ SC/IM: CPT

## 2023-06-21 PROCEDURE — 85025 COMPLETE CBC W/AUTO DIFF WBC: CPT | Performed by: INTERNAL MEDICINE

## 2023-06-21 PROCEDURE — 80053 COMPREHEN METABOLIC PANEL: CPT | Performed by: INTERNAL MEDICINE

## 2023-06-21 PROCEDURE — 96402 CHEMO HORMON ANTINEOPL SQ/IM: CPT

## 2023-06-21 RX ADMIN — DENOSUMAB 120 MG: 120 INJECTION SUBCUTANEOUS at 09:10

## 2023-06-21 RX ADMIN — LEUPROLIDE ACETATE 22.5 MG: KIT at 09:13

## 2023-06-21 NOTE — PROGRESS NOTES
Pt  Resting Comfortably  Port accessed, labs drawn, port saline-locked and deaccessed without issue  Lupron and Xgeva administered  Pt is having transportation issues as he can no longer drive and his son works  He has been getting his treatment labs drawn peripherally but doesn't have a ride for next weeks lab work  I drew the needed labs today (OK per SHANNON Bell RN) and he was set up for his labs to be drawn from his port going forward so that [employer] can help  Pt tolerated procedure well  Aware of next appt  AVS given

## 2023-06-23 ENCOUNTER — TELEPHONE (OUTPATIENT)
Dept: HEMATOLOGY ONCOLOGY | Facility: CLINIC | Age: 78
End: 2023-06-23

## 2023-06-23 NOTE — TELEPHONE ENCOUNTER
Spoke with pt and confirmed he is taking Zytiga 4 pills in the morning as well as Prednisone 5mg twice a day  Confirmed his follow up appt with Dr Sanjuanita Hill 8/1  Will follow up with him in about one month to confirm continued compliance

## 2023-06-27 DIAGNOSIS — I73.9 PAD (PERIPHERAL ARTERY DISEASE) (HCC): ICD-10-CM

## 2023-06-27 RX ORDER — GABAPENTIN 300 MG/1
600 CAPSULE ORAL 2 TIMES DAILY
Qty: 120 CAPSULE | Refills: 0 | Status: SHIPPED | OUTPATIENT
Start: 2023-06-27 | End: 2023-07-05 | Stop reason: SDUPTHER

## 2023-06-29 ENCOUNTER — HOSPITAL ENCOUNTER (OUTPATIENT)
Dept: NUCLEAR MEDICINE | Facility: HOSPITAL | Age: 78
End: 2023-06-29
Payer: COMMERCIAL

## 2023-06-29 DIAGNOSIS — M54.50 CHRONIC MIDLINE LOW BACK PAIN: ICD-10-CM

## 2023-06-29 DIAGNOSIS — C61 MALIGNANT NEOPLASM OF PROSTATE (HCC): ICD-10-CM

## 2023-06-29 DIAGNOSIS — G89.29 CHRONIC MIDLINE LOW BACK PAIN: ICD-10-CM

## 2023-06-29 PROCEDURE — 79101 NUCLEAR RX IV ADMIN: CPT

## 2023-06-29 PROCEDURE — A9607 HB LUTETIUM LU 177 VIPIVOTIDE TETRAXETAN, THERAPEUTIC, 1 MILLICURIE: HCPCS

## 2023-06-29 RX ORDER — METHOCARBAMOL 500 MG/1
500 TABLET, FILM COATED ORAL 3 TIMES DAILY
Qty: 90 TABLET | Refills: 0 | Status: SHIPPED | OUTPATIENT
Start: 2023-06-29

## 2023-06-29 RX ORDER — LANCING DEVICE
EACH MISCELLANEOUS
Qty: 1 EACH | OUTPATIENT
Start: 2023-06-29

## 2023-06-30 DIAGNOSIS — E11.9 DIABETES MELLITUS (HCC): Primary | ICD-10-CM

## 2023-06-30 RX ORDER — SYRING-NEEDL,DISP,INSUL,0.3 ML 30 GX5/16"
SYRINGE, EMPTY DISPOSABLE MISCELLANEOUS
Qty: 360 EACH | Refills: 3 | Status: SHIPPED | OUTPATIENT
Start: 2023-06-30 | End: 2023-07-30

## 2023-07-05 ENCOUNTER — OFFICE VISIT (OUTPATIENT)
Dept: PALLIATIVE MEDICINE | Facility: CLINIC | Age: 78
End: 2023-07-05

## 2023-07-05 DIAGNOSIS — G89.3 CANCER-RELATED PAIN: Chronic | ICD-10-CM

## 2023-07-05 DIAGNOSIS — I73.9 PAD (PERIPHERAL ARTERY DISEASE) (HCC): ICD-10-CM

## 2023-07-05 DIAGNOSIS — Z51.5 PALLIATIVE CARE PATIENT: ICD-10-CM

## 2023-07-05 RX ORDER — OXYCODONE HYDROCHLORIDE 5 MG/1
5 TABLET ORAL EVERY 6 HOURS PRN
Qty: 120 TABLET | Refills: 0 | Status: SHIPPED | OUTPATIENT
Start: 2023-07-05

## 2023-07-05 RX ORDER — GABAPENTIN 300 MG/1
600 CAPSULE ORAL 2 TIMES DAILY
Qty: 120 CAPSULE | Refills: 0 | Status: SHIPPED | OUTPATIENT
Start: 2023-07-24
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
## 2023-07-10 ENCOUNTER — HOSPITAL ENCOUNTER (OUTPATIENT)
Dept: INFUSION CENTER | Facility: CLINIC | Age: 78
Discharge: HOME | End: 2023-07-10
Payer: COMMERCIAL

## 2023-07-10 DIAGNOSIS — Z95.828 PORT-A-CATH IN PLACE: Primary | ICD-10-CM

## 2023-07-10 DIAGNOSIS — C61 PROSTATE CANCER (HCC): ICD-10-CM

## 2023-07-10 LAB — PSA SERPL-MCNC: 37.26 NG/ML (ref 0–4)

## 2023-07-10 PROCEDURE — 84153 ASSAY OF PSA TOTAL: CPT

## 2023-07-10 NOTE — PROGRESS NOTES
Patient is here for central labs. He offers no complaints at this time. Labs drawn per dr's orders.  Next appointment confirmed and avs given

## 2023-07-10 NOTE — PLAN OF CARE
Problem: Potential for Falls  Goal: Patient will remain free of falls  Description: INTERVENTIONS:  - Educate patient/family on patient safety including physical limitations  - Instruct patient to call for assistance with activity   - Consult OT/PT to assist with strengthening/mobility   - Keep Call bell within reach  - Keep bed low and locked with side rails adjusted as appropriate  - Keep care items and personal belongings within reach  - Initiate and maintain comfort rounds  - Make Fall Risk Sign visible to staff  -  - Apply yellow socks and bracelet for high fall risk patients  - Consider moving patient to room near nurses station  Outcome: Progressing     Problem: Knowledge Deficit  Goal: Patient/family/caregiver demonstrates understanding of disease process, treatment plan, medications, and discharge instructions  Description: Complete learning assessment and assess knowledge base.   Interventions:  - Provide teaching at level of understanding  - Provide teaching via preferred learning methods  Outcome: Progressing

## 2023-07-14 NOTE — PROGRESS NOTES
Advanced Heart Failure Outpatient Progress Note Link [facility] 68 y.o. male MRN: [id number]    Encounter: [id number]      Assessment/Plan:    Patient Active Problem List    Diagnosis Date Noted   • Atherosclerosis of artery of extremity with ulceration ([address]) 07/19/2021   • Lower extremity pain 07/11/2021   • Urinary retention 07/11/2021   • CAD (coronary artery disease) 07/10/2021   • Hypotension 07/10/2021   • Ischemic leg pain 07/07/2021   • Prostate cancer ([address]) 07/07/2021   • Chronic midline low back pain 07/01/2021   • Ischemic cardiomyopathy 07/01/2021   • Chronic combined systolic and diastolic CHF (congestive heart failure) ([address]) 06/22/2021   • S/P AVR 06/22/2021   • Anemia 06/22/2021   • Diabetic foot infection ([address]) 04/26/2023   • Thrombocytosis 04/13/2023   • Electrolyte abnormality 04/07/2023   • Generalized weakness 04/04/2023   • Troponin level elevated 04/04/2023   • Hypokalemia 04/04/2023   • Paronychia of great toe of right foot 04/03/2023   • Moderate vascular dementia ([address]) 03/30/2023   • Functional diarrhea 11/29/2022   • Ambulatory dysfunction 08/08/2022   • Peripheral neuropathy 08/08/2022   • Financial problems 08/08/2022   • Cancer-related pain 05/10/2022   • Palliative care patient 05/10/2022   • Malignant neoplasm metastatic to bone ([address]) 02/28/2022   • Embolism and 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 ([address]) 02/28/2022   • Depression, recurrent ([address]) 02/28/2022   • Weight loss 02/28/2022   • Muscular deconditioning 02/28/2022   • Port-A-Cath in place 10/05/2021       # Chronic HFrEF, NYHA III; ACC/AHA Stage C  Etiology: ischemic +/- progression of valvular disease +/- chemotherapy (on abiraterone and leuprolide with unclear safety profile).   Euvolemic, warm and and well perfused on exam    Diagnostic:    TTE 06/14/2021 (at [facility] pre- and post-TAVR, per report): LVEF 20%.  LVIDd 5.4 cm. "entire apex, mid and apical anterior septum, mid and apical inferior septum, mid and apical inferior wall, mid inferolateral wall, and mid lateral wall are akinetic." Trace MR and TR. Pre-TAVR: LVOT VTI 12.0 cm. JOSE 0.47 cm^2. Post: bioprosthetic AV valve present; JOSE 2.41 cm^2.                 TTE 06/23/2021:   LVEF 20%. LVIDd 5.9 cm. Grade 2 DD. aneurysmal LV mid to apex."dyskinesis and aneurysmal deformity of the apical anterior, mid anteroseptal, apical inferior, apical septal, and apical wall(s)."   Normal RV size and RVSF. Mild MR. AV bioprosthesis with normal leaflet separation. Trace TR.     TTE 11/17/21:  LVEF: 25%  LVIDd: 6.1cm  RV: normal size and systolic function  MR: mild to moderate   Other: Akinesis and aneurysmal deformity of the mid septum and apex. Grade 2 diastology  Ascending aorta 4cm  TAVR bioprosthetic valve, normally functioning. Mean gradient 3mmHg     TTE 12/15/22:  LVEF 30-35%. LVIDD , moderate concentric hypertrophy, regional wall motion abnormalities  Grade 1 diastology  Well seated TAVR valve, mild paravalvular AI, mean gradient 7  Estimated RVSP 31mmHg  Normal RV systolic function    Neurohormonal Blockade:  --Beta-Blocker: Metoprolol succinate 25 mg daily - decreased to 12.5mg daily  --ACEi, ARB or ARNi: Entresto 24/26 mg BID - switched to losartan 25mg daily due to low blood pressure - decreased to 12.5mg daily due to lightheadedness    (or SVR reduction)  --Aldosterone Receptor Blocker: Spironolactone 12.5 mg daily - off due to low BP  --SGLT2-I: Jardiance 10mg daily  --Diuretic: Lasix 20 mg daily     Sudden Cardiac Death Risk Reduction:  --ICD: s/p SJM BIV ICD 12/23/21  Interrogation 5/15/23: AP 56%. BVP 98%. AT/AF burden <1%. CoreVue normal   Interrogation 1/12/23: AP 58% BVP 98%. 2VT-NS episodes. AT/AF burden <1%. CoreVue normal     Electrical Resynchronization:  -- LBBB QRSd 158 msec preBIV; QRSd 148msec post BIV     Advanced Therapies (If appropriate):   --We will continue to monitor     # Ventricular tachycardia  Detected on device interrogation 4/29/2022 status post ATP. No recurrence. Maintained on beta blocker. # Prostate cancer with bony metastases  Receiving chemotherapy through the [place]. Taking SQ leuprolide (Eligard) and abiraterone (Zytiga) - safety of abiraterone in patients with LVEF <50% not established per FDA. [place]-analysis of drug noted increased incidence and relative risk of CV toxicity.     # Coronary artery disease  Without active chest pain. S/pstenting to circumflex and RCA in 2001. C 06/09/2021 (per documentation):"diffuse moderate prox and mid disease/ISR, severe OM lesion and total mid LAD."  Rx: aspirin, plavix, statin     # Aortic stenosis  S/p TAVR (Evolut Pro+ 29mm) on 06/14/2021 at [facility]. Normally functioning on last echo 12/2022    # RLE ischemia. LEAD with >75% stenosis of the CFA/SFA. s/p right femoral endarterectomy 7/9/21  Continues on ASA, Plavix, and statin. Follows with Dr. Calderon Jack      # Hyperlipidemia.    # History of atrial tachycardia: s/p ablation at OSH in 2018. # Benign prostatic hyperplasia      TODAY'S PLAN:  Continue current medications  Obtain echocardiogram to reassess heart function  2g sodium diet   2L fluid restriction  Daily weights  Physical activities/exercise as tolerated      HPI:   Debbie Guzman is a 51-year-old man with a PMH as aboe who presents for hospital follow-up.     Presented to [facility] hospital on 06/03/2021 with chest pressure before chemotherapy treatment for prostate cancer. Sent for testing and noted to be in acute CHF exacerbation and was diuresed. Was then transferred to [facility] in [facility].  TTE showed "severely reduced LVEF with LV apical aneurysm and septal/AW AK, RV normal, Vmax at 3.5 (however, it was read as moderate AS and preserved EF in 2019 so likely this is progression of AS in addition to interim AWMI)."  [facility] also done which revealed "diffuse moderate prox and mid disease/ISR, severe OM lesion and total mid LAD."     Transferred to [facility] on 06/13 for TAVR.TAVR completed on 06/14, and patient was discharged home on 06/15/2021. Started on metoprolol succinate this admission (tartrate discontinued).      Admitted to [facility] from 06/22 to 06/25/2021 after presenting with worsening SOB and LE edema for 3 days. WAS without discharge medications from [facility] admission for about 1 week (including Lasix). NT-proBNP 5157. Cardiology was consulted, TTE was completed, and IV diuresis was started. Reduced LVEF was confirmed and aneurysmal wall motion of apical segments. Transitioned to PO diuretics on day of discharge. Started on Entresto and fit with LifeVest. Lost 10 lbs this admission.      07/06/2021: Patient presents for hospital follow-up. Primary complaint today is of dizziness and right LE pain/numbeness and right foot wound. Reports decreased appetite and increased frustration with attempting to adhere to low sodium diet. Did receive "Living With Heart Failure" booklet during admission. Prior to admissions, often would eat canned pastas for his meals. Considering getting Meals of Wheels. Unclear if patient has been taking both tartrate and succinate since discharge. Not wearing LifeVest; dislikes wearing it and left device at home today. Has not been completing daily weights; does not have scale at home. Patient organizes his own meds; VNA in home 2 times weekly. 7/16/2021: Patient is here for follow-up. Patient hospitalized 6/17/20July 7-13 due to right lower extremity ischemia. Underwent right femoral endarterectomy on June 9. Furosemide Entresto held prior to surgery due to low blood pressure. Eventually discharged on low-dose losartan 12.5 mg daily due to soft blood pressure. Furosemide 40 mg daily resumed. Patient reports right lower extremity pain and difficulty bearing weight on the right leg. Otherwise denies shortness of breath, chest pain or lightheadedness. 9/15/21: Here for follow up. Overall doing good.  Mainly complaining of residual numbness/tingling on right leg. No shortness of breath, chest pain, palpitations or lightheadedness. 1/25/22: Here for follow up. S/p BIV ICD placement 12/23/21. Reports lightheadedness "all the time". No feeling faint. Spinning sensation when laying down. Mainly limited by leg pain, not doing much walking. Denied shortness of breath walking from the parking lot to the office and from the waiting area to exam room. 4/26/22: Here for follow up. Decreased furosemide to 20mg daily on last visit. 4/18/22 Na 141 K 3.9 creatinine 0.82. One episode of vertigo about a week ago. Still with intermittent episodes of lightheadedness. Doing PT twice a week. Still having lower extremity nerve pains    7/27/22: Here for follow up. Mainly limited by foot pain. Increased calorie intake, eating more ice cream.    11/22/22: Here for follow up. Getting new treatment for prostate cancer (Pluvitico). Getting right buttock pain and right shoulder pain, taking advil 400mg 3x/day in between tylenol. Occasional lightheadedness. No shortness of breath on current level of exertion, limited walking due to foot pain but able to go up a flight of stairs slowly without shortness of breath, feels tired. 3/15/23: Here for follow up. 2/20/23 Na 142 K 3.7 creatinine 0.79. Reports doing ok from a cardiac standpoint. No shortness of breath or chest pain on current level of exertion. Activities depends on the day. Having good and bad days. Intake also variable. Per VNS, recently with low Bps likely due to decreased oral intake, weight stable. Normotensive on office visit today. Not taking Jardiance, not sure why. "takes what is given to him from pharmacy. Interval History:   7/14/23: here for follow up. Started jardiance and stopped furosemide on last office visit. CMP 6/21/23 Na 140 K 4.1 and creatinine 0.75. Weight up. Reports eating fatty foods and snacking more. Not active. No leg swelling, PND, or orthopnea.  Occasional lightheadedness with getting up fast.     Review of Systems   Constitutional: Negative for chills and fever. HENT: Negative for ear pain and sore throat. Eyes: Negative for pain and visual disturbance. Respiratory: Negative for cough, chest tightness and shortness of breath. Cardiovascular: Negative for chest pain, palpitations and leg swelling. Gastrointestinal: Negative for abdominal distention, abdominal pain, nausea and vomiting. Genitourinary: Negative for dysuria and hematuria. Musculoskeletal: Negative for arthralgias and back pain. Skin: Negative for color change and rash. Neurological: Negative for dizziness, seizures, syncope and light-headedness. All other systems reviewed and are negative. Past Medical History:   Diagnosis Date   • Cancer ([facility]) 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   • Coronary artery disease 2001    S/p stenting to circumflex and RCA   • HFrEF (heart failure with reduced ejection fraction) ([facility]) 06/14/2021   • High cholesterol    • Prostate cancer (H[facility])          No Known Allergies  .     Current Outpatient Medications:   •  abiraterone (ZYTIGA) 250 mg tablet, Take 4 tablets (1,000 mg total) by mouth once daily, Disp: 120 tablet, Rfl: 10  •  acetaminophen (TYLENOL) 500 mg tablet, Take 2 tablets (1,000 mg total) by mouth 3 (three) times a day as needed for mild pain, Disp: , Rfl:   •  aspirin 81 mg chewable tablet, Chew 1 tablet (81 mg total) daily, Disp: 90 tablet, Rfl: 0  •  atorvastatin (LIPITOR) 80 mg tablet, Take 1 tablet (80 mg total) by mouth daily with dinner, Disp: 90 tablet, Rfl: 1  •  cetirizine (ZyrTEC) 5 MG tablet, Take 1 tablet (5 mg total) by mouth daily, Disp: 90 tablet, Rfl: 3  •  clopidogrel (PLAVIX) 75 mg tablet, Take 1 tablet (75 mg total) by mouth daily, Disp: 90 tablet, Rfl: 0  •  Empagliflozin (JARDIANCE) 10 MG TABS tablet, Take 1 tablet (10 mg total) by mouth every morning, Disp: 30 tablet, Rfl: 11  •  famotidine (PEPCID) 20 mg tablet, Take 1 tablet (20 mg total) by mouth daily, Disp: 30 tablet, Rfl: 2  •  ferrous sulfate 325 (65 Fe) mg tablet, Take 1 tablet (325 mg total) by mouth 2 (two) times a day with meals, Disp: 180 tablet, Rfl: 0  •  furosemide (LASIX) 20 mg tablet, Take 1 tablet (20 mg total) by mouth daily, Disp: 30 tablet, Rfl: 0  •  [START ON 2023] gabapentin (NEURONTIN) 300 mg capsule, Take 2 capsules (600 mg total) by mouth 2 (two) times a day To reduce nerve pain in feet. Do not start before 2023., Disp: 120 capsule, Rfl: 0  •  loperamide (IMODIUM) 2 mg capsule, Take 1 capsule (2 mg total) by mouth 4 (four) times a day as needed for diarrhea, Disp: 30 capsule, Rfl: 2  •  losartan (COZAAR) 25 mg tablet, Take 0.5 tablets (12.5 mg total) by mouth daily, Disp: 45 tablet, Rfl: 1  •  methocarbamol (ROBAXIN) 500 mg tablet, TAKE 1 TABLET (500 MG TOTAL) BY MOUTH 3 (THREE) TIMES A DAY, Disp: 90 tablet, Rfl: 0  •  metoprolol succinate (TOPROL-XL) 25 mg 24 hr tablet, 1/2 tablet daily, Disp: 45 tablet, Rfl: 2  •  oxyCODONE (ROXICODONE) 5 immediate release tablet, Take 1 tablet (5 mg total) by mouth every 6 (six) hours as needed (cancer pain) Max Daily Amount: 20 mg, Disp: 120 tablet, Rfl: 0  •  potassium chloride (K-DUR,KLOR-CON) 20 mEq tablet, Take 1 tablet (20 mEq total) by mouth daily, Disp: 30 tablet, Rfl: 2  •  predniSONE 5 mg tablet, Take 1 tablet (5 mg total) by mouth 2 (two) times a day with meals, Disp: 60 tablet, Rfl: 5  •  Lancet Device MISC, Use 4 (four) times a day (with meals and at bedtime), Disp: 360 each, Rfl: 3    Social History     Socioeconomic History   • Marital status:       Spouse name: Not on file   • Number of children: 3   • Years of education: Not on file   • Highest education level: Not on file   Occupational History   • Not on file   Tobacco Use   • Smoking status: Former     Years: 20.00     Types: Cigarettes     Quit date: 2002     Years since quittin.0   • Smokeless tobacco: Never   Vaping Use   • Vaping Use: Never used   Substance and Sexual Activity   • Alcohol use: Not Currently   • Drug use: Not Currently   • Sexual activity: Not on file   Other Topics Concern   • Not on file   Social History Narrative   • Not on file     Social Determinants of Health     Financial Resource Strain: Not on file   Food Insecurity: No Food Insecurity (4/4/2023)    Hunger Vital Sign    • Worried About Running Out of Food in the Last Year: Never true    • Ran Out of Food in the Last Year: Never true   Transportation Needs: No Transportation Needs (4/4/2023)    PRAPARE - Transportation    • Lack of Transportation (Medical): No    • Lack of Transportation (Non-Medical): No   Physical Activity: Not on file   Stress: Not on file   Social Connections: Not on file   Intimate Partner Violence: Not on file   Housing Stability: Unknown (4/4/2023)    Housing Stability Vital Sign    • Unable to Pay for Housing in the Last Year: No    • Number of Places Lived in the Last Year: Not on file    • Unstable Housing in the Last Year: No       History reviewed. No pertinent family history. Physical Exam:    Vitals:   Vitals:    07/17/23 1132   BP: 116/64   Pulse: 73   SpO2: 97%       Physical Exam  Constitutional:       General: He is not in acute distress. Appearance: Normal appearance. HENT:      Head: Normocephalic and atraumatic. Mouth/Throat:      Mouth: Mucous membranes are moist.   Eyes:      General: No scleral icterus. Extraocular Movements: Extraocular movements intact. Cardiovascular:      Rate and Rhythm: Normal rate and regular rhythm. Heart sounds: S1 normal and S2 normal. No murmur heard. No friction rub. No gallop. Comments: Nonelevated JVP  Pulmonary:      Breath sounds: Normal breath sounds. Abdominal:      General: There is no distension. Palpations: Abdomen is soft. Tenderness: There is no abdominal tenderness. There is no guarding or rebound.    Musculoskeletal:         General: Normal range of motion. Cervical back: Neck supple. Right lower leg: No edema. Left lower leg: No edema. Skin:     General: Skin is warm and dry. Capillary Refill: Capillary refill takes less than 2 seconds. Neurological:      General: No focal deficit present. Mental Status: He is alert and oriented to person, place, and time. Psychiatric:         Mood and Affect: Mood normal.         Labs & Results:    Lab Results   Component Value Date    SODIUM 140 06/21/2023    K 4.1 06/21/2023     (H) 06/21/2023    CO2 24 06/21/2023    BUN 20 06/21/2023    CREATININE 0.75 06/21/2023    GLUC 101 06/21/2023    CALCIUM 8.5 06/21/2023     Lab Results   Component Value Date    WBC 6.80 06/21/2023    HGB 10.8 (L) 06/21/2023    HCT 35.4 (L) 06/21/2023    MCV 91 06/21/2023     06/21/2023     Lab Results   Component Value Date    NTBNP 4,279 (H) 02/14/2022      Lab Results   Component Value Date    CHOLESTEROL 146 09/30/2021     Lab Results   Component Value Date    HDL 35 (L) 09/30/2021     Lab Results   Component Value Date    TRIG 249 (H) 09/30/2021     No results found for: "[address]"    EKG personally reviewed by Marco Antonio Estevez. Counseling / Coordination of Care  Time spent today 25 minutes. Greater than 50% of total time was spent with the patient and / or family counseling and / or coordination of care. We went over current diagnosis, most recent studies and any changes in treatment. Thank you for the opportunity to participate in the care of this patient.     Marco Antonio Estevez MD  ADVANCED HEART FAILURE AND MECHANICAL CIRCULATORY SUPPORT  [address]

## 2023-07-17 ENCOUNTER — OFFICE VISIT (OUTPATIENT)
Dept: CARDIOLOGY CLINIC | Facility: CLINIC | Age: 78
End: 2023-07-17
Payer: COMMERCIAL

## 2023-07-17 VITALS
OXYGEN SATURATION: 97 % | HEART RATE: 73 BPM | DIASTOLIC BLOOD PRESSURE: 64 MMHG | WEIGHT: 195 LBS | HEIGHT: 67 IN | SYSTOLIC BLOOD PRESSURE: 116 MMHG | BODY MASS INDEX: 30.61 KG/M2

## 2023-07-17 DIAGNOSIS — I25.10 CORONARY ARTERY DISEASE INVOLVING NATIVE CORONARY ARTERY OF NATIVE HEART WITHOUT ANGINA PECTORIS: ICD-10-CM

## 2023-07-17 DIAGNOSIS — Z95.2 S/P TAVR (TRANSCATHETER AORTIC VALVE REPLACEMENT): ICD-10-CM

## 2023-07-17 DIAGNOSIS — I50.22 CHRONIC HFREF (HEART FAILURE WITH REDUCED EJECTION FRACTION) (HCC): Primary | ICD-10-CM

## 2023-07-17 DIAGNOSIS — Z95.810 PRESENCE OF AUTOMATIC CARDIOVERTER/DEFIBRILLATOR (AICD): ICD-10-CM

## 2023-07-17 PROCEDURE — 99214 OFFICE O/P EST MOD 30 MIN: CPT | Performed by: INTERNAL MEDICINE

## 2023-07-17 NOTE — PATIENT INSTRUCTIONS
Continue current medications  Obtain echocardiogram to reassess heart function  2g sodium diet   2L fluid restriction  Daily weights  Physical activities/exercise as tolerated

## 2023-07-18 ENCOUNTER — TELEPHONE (OUTPATIENT)
Dept: PALLIATIVE MEDICINE | Facility: CLINIC | Age: 78
End: 2023-07-18

## 2023-07-18 DIAGNOSIS — M54.50 CHRONIC MIDLINE LOW BACK PAIN: ICD-10-CM

## 2023-07-18 DIAGNOSIS — G89.29 CHRONIC MIDLINE LOW BACK PAIN: ICD-10-CM

## 2023-07-18 RX ORDER — METHOCARBAMOL 500 MG/1
500 TABLET, FILM COATED ORAL 3 TIMES DAILY
Qty: 90 TABLET | Refills: 0 | Status: SHIPPED | OUTPATIENT
Start: 2023-07-18

## 2023-07-20 ENCOUNTER — TELEPHONE (OUTPATIENT)
Dept: VASCULAR SURGERY | Facility: CLINIC | Age: 78
End: 2023-07-20

## 2023-07-20 NOTE — TELEPHONE ENCOUNTER
Called pt to let him know his primary care doctor will not give him a auth to have his study done with us unless he sees them first.  If he wants to use his private ins then we can see him.

## 2023-07-21 ENCOUNTER — PATIENT OUTREACH (OUTPATIENT)
Dept: CASE MANAGEMENT | Facility: OTHER | Age: 78
End: 2023-07-21

## 2023-07-21 ENCOUNTER — TELEPHONE (OUTPATIENT)
Dept: HEMATOLOGY ONCOLOGY | Facility: CLINIC | Age: 78
End: 2023-07-21

## 2023-07-24 ENCOUNTER — PATIENT OUTREACH (OUTPATIENT)
Dept: CASE MANAGEMENT | Facility: OTHER | Age: 78
End: 2023-07-24

## 2023-07-24 NOTE — TELEPHONE ENCOUNTER
7/24 Spoke with pt and confirmed he is taking Zytiga "the big pills for the cancer". Pt confirmed he is taking 4 pills every morning. Denies any issues. Will follow up in about a month for ongoing compliance monitoring.

## 2023-07-25 DIAGNOSIS — E87.6 HYPOKALEMIA: ICD-10-CM

## 2023-07-25 RX ORDER — POTASSIUM CHLORIDE 20 MEQ/1
20 TABLET, EXTENDED RELEASE ORAL DAILY
Qty: 30 TABLET | Refills: 2 | Status: SHIPPED | OUTPATIENT
Start: 2023-07-25 | End: 2023-10-23

## 2023-07-26 ENCOUNTER — HOSPITAL ENCOUNTER (OUTPATIENT)
Dept: VASCULAR ULTRASOUND | Facility: HOSPITAL | Age: 78
Discharge: HOME | End: 2023-07-26
Attending: SURGERY
Payer: COMMERCIAL

## 2023-07-26 DIAGNOSIS — L97.909 ATHEROSCLEROSIS OF ARTERY OF EXTREMITY WITH ULCERATION (HCC): ICD-10-CM

## 2023-07-26 DIAGNOSIS — M79.604 PAIN OF RIGHT LOWER EXTREMITY: ICD-10-CM

## 2023-07-26 DIAGNOSIS — M79.606 ISCHEMIC LEG PAIN: ICD-10-CM

## 2023-07-26 DIAGNOSIS — I70.299 ATHEROSCLEROSIS OF ARTERY OF EXTREMITY WITH ULCERATION (HCC): ICD-10-CM

## 2023-07-26 DIAGNOSIS — I99.8 ISCHEMIC LEG PAIN: ICD-10-CM

## 2023-07-26 PROCEDURE — 93926 LOWER EXTREMITY STUDY: CPT | Performed by: SURGERY

## 2023-07-26 PROCEDURE — 93922 UPR/L XTREMITY ART 2 LEVELS: CPT | Performed by: SURGERY

## 2023-07-26 PROCEDURE — 93926 LOWER EXTREMITY STUDY: CPT

## 2023-07-27 ENCOUNTER — TRANSCRIBE ORDERS (OUTPATIENT)
Dept: VASCULAR SURGERY | Facility: CLINIC | Age: 78
End: 2023-07-27

## 2023-07-27 DIAGNOSIS — I73.9 PAD (PERIPHERAL ARTERY DISEASE) (HCC): Primary | ICD-10-CM

## 2023-07-31 ENCOUNTER — HOSPITAL ENCOUNTER (OUTPATIENT)
Dept: INFUSION CENTER | Facility: CLINIC | Age: 78
Discharge: HOME | End: 2023-07-31
Payer: COMMERCIAL

## 2023-07-31 ENCOUNTER — OFFICE VISIT (OUTPATIENT)
Dept: INTERNAL MEDICINE CLINIC | Facility: CLINIC | Age: 78
End: 2023-07-31
Payer: COMMERCIAL

## 2023-07-31 VITALS
TEMPERATURE: 98.5 F | HEART RATE: 77 BPM | WEIGHT: 196 LBS | SYSTOLIC BLOOD PRESSURE: 96 MMHG | DIASTOLIC BLOOD PRESSURE: 60 MMHG | BODY MASS INDEX: 30.7 KG/M2 | OXYGEN SATURATION: 97 %

## 2023-07-31 DIAGNOSIS — I73.9 PAD (PERIPHERAL ARTERY DISEASE) (HCC): ICD-10-CM

## 2023-07-31 DIAGNOSIS — I25.10 CORONARY ARTERY DISEASE INVOLVING NATIVE HEART, UNSPECIFIED VESSEL OR LESION TYPE, UNSPECIFIED WHETHER ANGINA PRESENT: ICD-10-CM

## 2023-07-31 DIAGNOSIS — Z95.828 PORT-A-CATH IN PLACE: Primary | ICD-10-CM

## 2023-07-31 DIAGNOSIS — Z11.59 NEED FOR HEPATITIS C SCREENING TEST: ICD-10-CM

## 2023-07-31 DIAGNOSIS — I50.42 CHRONIC COMBINED SYSTOLIC AND DIASTOLIC CHF (CONGESTIVE HEART FAILURE) (HCC): ICD-10-CM

## 2023-07-31 DIAGNOSIS — C79.51 MALIGNANT NEOPLASM METASTATIC TO BONE (HCC): ICD-10-CM

## 2023-07-31 DIAGNOSIS — R26.2 AMBULATORY DYSFUNCTION: Chronic | ICD-10-CM

## 2023-07-31 DIAGNOSIS — C61 PROSTATE CANCER (HCC): ICD-10-CM

## 2023-07-31 DIAGNOSIS — G62.9 PERIPHERAL POLYNEUROPATHY: Primary | Chronic | ICD-10-CM

## 2023-07-31 LAB
ALBUMIN SERPL BCP-MCNC: 4.3 G/DL (ref 3.5–5)
ALP SERPL-CCNC: 108 U/L (ref 34–104)
ALT SERPL W P-5'-P-CCNC: 9 U/L (ref 7–52)
ANION GAP SERPL CALCULATED.3IONS-SCNC: 8 MMOL/L
AST SERPL W P-5'-P-CCNC: 11 U/L (ref 13–39)
BILIRUB SERPL-MCNC: 0.71 MG/DL (ref 0.2–1)
BUN SERPL-MCNC: 28 MG/DL (ref 5–25)
CALCIUM SERPL-MCNC: 8.6 MG/DL (ref 8.4–10.2)
CHLORIDE SERPL-SCNC: 105 MMOL/L (ref 96–108)
CO2 SERPL-SCNC: 26 MMOL/L (ref 21–32)
CREAT SERPL-MCNC: 0.9 MG/DL (ref 0.6–1.3)
GFR SERPL CREATININE-BSD FRML MDRD: 82 ML/MIN/1.73SQ M
GLUCOSE SERPL-MCNC: 96 MG/DL (ref 65–140)
POTASSIUM SERPL-SCNC: 4.1 MMOL/L (ref 3.5–5.3)
PROT SERPL-MCNC: 6.8 G/DL (ref 6.4–8.4)
SODIUM SERPL-SCNC: 139 MMOL/L (ref 135–147)

## 2023-07-31 PROCEDURE — 99214 OFFICE O/P EST MOD 30 MIN: CPT

## 2023-07-31 PROCEDURE — 80053 COMPREHEN METABOLIC PANEL: CPT | Performed by: INTERNAL MEDICINE

## 2023-07-31 RX ORDER — CAPSAICIN 0.75 MG/G
CREAM TOPICAL 3 TIMES DAILY
Qty: 60 G | Refills: 0 | Status: SHIPPED | OUTPATIENT
Start: 2023-07-31 | End: 2023-08-07

## 2023-07-31 NOTE — ASSESSMENT & PLAN NOTE
Takes abiraterone 250 mg with prednisone, also on infusions on Pluvicto and denosumab    Continue follow-up with hematology oncology

## 2023-07-31 NOTE — ASSESSMENT & PLAN NOTE
Wt Readings from Last 3 Encounters:   07/31/23 88.9 kg (196 lb)   07/17/23 88.5 kg (195 lb)   06/15/23 84.1 kg (185 lb 6.4 oz)     · Patient is euvolemic at this visit, denies shortness of breath  · Continue goal-directed therapy with metoprolol 12.5 twice daily, empagliflozin 10 mg daily,  · Of note patient has been taking of spironolactone and Lasix due to hypotension and dizziness    ·

## 2023-07-31 NOTE — PROGRESS NOTES
Name: Charlene Weaver      :       MRN: [id number]  Encounter Provider: Silviano Mckee MD  Encounter Date: 2023   Encounter department: [address] [place] [address]     1. Peripheral polyneuropathy  Assessment & Plan:  · History of neuropathy, prior diagnosis of diabetes  · Complaints this visit about right foot pain, tingling with pins  · No swelling or erythema  · Has been on gabapentin 600 mg twice daily started by Dr. Destiny Alejandra  · Has difficulty ambulating due to being uncomfortable from the pain, use a walker  · Examination of the foot, appears dry, right great toe wound healed, presents of skin break/wound on third digit of the right foot. No leg or ankle swelling, no evidence of fracture   · A1C 5.6  Plan   · Voltaren cream 2-3 times a day  · Capsicin cream applied 2-3 times a day and avoid areas of open wounds or sores. · Educated about safety measures to prevent falls    Orders:  -     capsicum (ZOSTRIX) 0.075 % topical cream; Apply topically 3 (three) times a day for 7 days Apply on the feet and avoid areas of wounds or scabs  -     Diclofenac Sodium (VOLTAREN) 1 %; Apply 2 g topically 4 (four) times a day for 14 days For pain to affected area    2. Chronic combined systolic and diastolic CHF (congestive heart failure) ([facility])  Assessment & Plan:  Wt Readings from Last 3 Encounters:   23 88.9 kg (196 lb)   23 88.5 kg (195 lb)   06/15/23 84.1 kg (185 lb 6.4 oz)     · Patient is euvolemic at this visit, denies shortness of breath  · Continue goal-directed therapy with metoprolol 12.5 twice daily, empagliflozin 10 mg daily,  · Of note patient has been taking of spironolactone and Lasix due to hypotension and dizziness    ·       3.  Coronary artery disease involving native heart, unspecified vessel or lesion type, unspecified whether angina present  Assessment & Plan:  Status post stent RCA and circumflex in   Also had left heart cath in 2021 which showed  diffuse moderate prox and mid disease/ISR, severe OM lesion and total mid LAD    Denies active chest pain    Plan   Continue aspirin, Plavix, statin          4. Need for hepatitis C screening test    5. Prostate cancer [facility])  Assessment & Plan:  Takes abiraterone 250 mg with prednisone, also on infusions on Pluvicto and denosumab    Continue follow-up with hematology oncology      6. Ambulatory dysfunction    7. Malignant neoplasm metastatic to bone [facility])  Assessment & Plan:  Metastatic prostate cancer  Follows up with palliative care  Had an infusion with denosumab 7/31 today  No acute pain this visit  She will continue follow-up with heme-onc and palliative      8. PAD (peripheral artery disease) (HCC)  Assessment & Plan:  S/p SFA stent   VAS LLE 7/26   Evaluation shows a patent superficial femoral artery S/P Stent placement. Ankle/Brachial index: 0.58 (Severe Range) Prior:  Non-compressible. Metatarsal pressure of 63mmHg. Prior Not recorded. Great toe pressure of 29mmHg, within the healing range. Prior Not recorded. PVR/ PPG tracings are dampened    PLAN   Continue DAPT. Statin  Foot care              Subjective      Pleasant 77-year-old male who chronic heart failure with reduced ejection fraction, NYHA III, metastatic prostate cancer on abiraterone and leuprolide, denosumab infusion, peripheral arterial disease status post SFA stent, diabetic neuropathy. Patient complaints about right foot pain, describes as pins-and-needles tingling worse when he walks. Denies any trauma, swelling, redness. Had a wound on the right great toe which is now healed, now with a very small wound on the third digit of the right foot. He denies calf pain, no fevers. Patient is able to ambulate properly uses walker for fear of falls. Patient still able to carry out his activities of daily living, although reports he spends most of his days in his chair.   For patient's other chronic problems noted to be cancer, patient is compliant to his Mardee Zena takes 4 tablets daily, was seen at the oncology clinic today for port maintenance and also received a dose of denosumab infusion. Patient denies any shortness of breath, palpitations, chest pain, nausea vomiting or diarrhea. Eating and drinking adequately    Review of Systems   Constitutional: Negative for chills and fever. HENT: Negative for ear pain and sore throat. Eyes: Negative for pain and visual disturbance. Respiratory: Negative for cough and shortness of breath. Cardiovascular: Negative for chest pain and palpitations. Gastrointestinal: Negative for abdominal pain and vomiting. Endocrine: Negative for polyuria. Genitourinary: Positive for frequency. Negative for dysuria and hematuria. Musculoskeletal: Negative for arthralgias and back pain. Skin: Negative for color change and rash. Neurological: Negative for dizziness, seizures and syncope. All other systems reviewed and are negative.       Current Outpatient Medications on File Prior to Visit   Medication Sig   • abiraterone (ZYTIGA) 250 mg tablet Take 4 tablets (1,000 mg total) by mouth once daily   • acetaminophen (TYLENOL) 500 mg tablet Take 2 tablets (1,000 mg total) by mouth 3 (three) times a day as needed for mild pain   • aspirin 81 mg chewable tablet Chew 1 tablet (81 mg total) daily   • atorvastatin (LIPITOR) 80 mg tablet Take 1 tablet (80 mg total) by mouth daily with dinner   • cetirizine (ZyrTEC) 5 MG tablet Take 1 tablet (5 mg total) by mouth daily   • clopidogrel (PLAVIX) 75 mg tablet Take 1 tablet (75 mg total) by mouth daily   • Empagliflozin (JARDIANCE) 10 MG TABS tablet Take 1 tablet (10 mg total) by mouth every morning   • famotidine (PEPCID) 20 mg tablet Take 1 tablet (20 mg total) by mouth daily   • ferrous sulfate 325 (65 Fe) mg tablet Take 1 tablet (325 mg total) by mouth 2 (two) times a day with meals   • furosemide (LASIX) 20 mg tablet Take 1 tablet (20 mg total) by mouth daily   • gabapentin (NEURONTIN) 300 mg capsule Take 2 capsules (600 mg total) by mouth 2 (two) times a day To reduce nerve pain in feet. Do not start before July 24, 2023. • loperamide (IMODIUM) 2 mg capsule Take 1 capsule (2 mg total) by mouth 4 (four) times a day as needed for diarrhea   • losartan (COZAAR) 25 mg tablet Take 0.5 tablets (12.5 mg total) by mouth daily   • methocarbamol (ROBAXIN) 500 mg tablet TAKE 1 TABLET (500 MG TOTAL) BY MOUTH 3 (THREE) TIMES A DAY   • metoprolol succinate (TOPROL-XL) 25 mg 24 hr tablet 1/2 tablet daily   • oxyCODONE (ROXICODONE) 5 immediate release tablet Take 1 tablet (5 mg total) by mouth every 6 (six) hours as needed (cancer pain) Max Daily Amount: 20 mg   • potassium chloride (K-DUR,KLOR-CON) 20 mEq tablet TAKE 1 TABLET (20 MEQ TOTAL) BY MOUTH DAILY   • predniSONE 5 mg tablet Take 1 tablet (5 mg total) by mouth 2 (two) times a day with meals   • Lancet Device MISC Use 4 (four) times a day (with meals and at bedtime)       Objective     BP 96/60 (BP Location: Left arm, Patient Position: Sitting, Cuff Size: Large)   Pulse 77   Temp 98.5 °F (36.9 °C)   Wt 88.9 kg (196 lb)   SpO2 97%   BMI 30.70 kg/m²     Physical Exam  Constitutional:       General: He is not in acute distress. Appearance: Normal appearance. He is not toxic-appearing or diaphoretic. HENT:      Nose: No congestion or rhinorrhea. Mouth/Throat:      Pharynx: Oropharynx is clear. Eyes:      Conjunctiva/sclera: Conjunctivae normal.   Neck:      Vascular: No carotid bruit. Cardiovascular:      Rate and Rhythm: Normal rate and regular rhythm. Pulses:           Dorsalis pedis pulses are 1+ on the right side and 2+ on the left side. Heart sounds: Normal heart sounds. No murmur heard. No gallop. Pulmonary:      Effort: No respiratory distress. Breath sounds: No wheezing or rales. Abdominal:      General: There is no distension. Palpations: Abdomen is soft. Tenderness: There is no abdominal tenderness. There is no guarding. Musculoskeletal:         General: No swelling or deformity. Cervical back: Neck supple. Right lower leg: No edema. Left lower leg: No edema. Feet:      Right foot:      Skin integrity: Skin breakdown (third digit) and dry skin present. No fissure. Toenail Condition: Right toenails are abnormally thick. Left foot:      Skin integrity: Dry skin present. No fissure. Toenail Condition: Left toenails are abnormally thick. Skin:     Coloration: Skin is not jaundiced. Findings: No bruising. Neurological:      General: No focal deficit present. Mental Status: He is alert and oriented to person, place, and time. Cranial Nerves: No cranial nerve deficit. Motor: No weakness. Psychiatric:         Mood and Affect: Mood normal.         Thought Content:  Thought content normal.       Silviano Mckee MD

## 2023-07-31 NOTE — ASSESSMENT & PLAN NOTE
S/p SFA stent   VAS LLE 7/26   Evaluation shows a patent superficial femoral artery S/P Stent placement. Ankle/Brachial index: 0.58 (Severe Range) Prior:  Non-compressible. Metatarsal pressure of 63mmHg. Prior Not recorded. Great toe pressure of 29mmHg, within the healing range. Prior Not recorded. PVR/ PPG tracings are dampened    PLAN   Continue DAPT.  Statin  Foot care

## 2023-07-31 NOTE — ASSESSMENT & PLAN NOTE
· History of neuropathy, prior diagnosis of diabetes  · Complaints this visit about right foot pain, tingling with pins  · No swelling or erythema  · Has been on gabapentin 600 mg twice daily started by Dr. Michelle Wright  · Has difficulty ambulating due to being uncomfortable from the pain, use a walker  · Examination of the foot, appears dry, right great toe wound healed, presents of skin break/wound on third digit of the right foot. No leg or ankle swelling, no evidence of fracture   · A1C 5.6  Plan   · Voltaren cream 2-3 times a day  · Capsicin cream applied 2-3 times a day and avoid areas of open wounds or sores.   · Educated about safety measures to prevent falls

## 2023-07-31 NOTE — ASSESSMENT & PLAN NOTE
Metastatic prostate cancer  Follows up with palliative care  Had an infusion with denosumab 7/31 today  No acute pain this visit  She will continue follow-up with heme-onc and palliative

## 2023-07-31 NOTE — PROGRESS NOTES
Patient to [address] [facility] for Lab Testing / Port Maintenance: Offers no complaints at present time: Right PAC accessed without difficulty: Good blood return noted: Labs drawn per MD order

## 2023-07-31 NOTE — ASSESSMENT & PLAN NOTE
Status post stent RCA and circumflex in 2001  Also had left heart cath in 2021 which showed  diffuse moderate prox and mid disease/ISR, severe OM lesion and total mid LAD    Denies active chest pain    Plan   Continue aspirin, Plavix, statin

## 2023-08-01 ENCOUNTER — OFFICE VISIT (OUTPATIENT)
Dept: HEMATOLOGY ONCOLOGY | Facility: CLINIC | Age: 78
End: 2023-08-01
Payer: COMMERCIAL

## 2023-08-01 VITALS
SYSTOLIC BLOOD PRESSURE: 110 MMHG | BODY MASS INDEX: 31.14 KG/M2 | OXYGEN SATURATION: 97 % | WEIGHT: 198.4 LBS | HEART RATE: 80 BPM | HEIGHT: 67 IN | DIASTOLIC BLOOD PRESSURE: 80 MMHG | TEMPERATURE: 97 F | RESPIRATION RATE: 17 BRPM

## 2023-08-01 DIAGNOSIS — C61 PROSTATE CANCER (HCC): Primary | ICD-10-CM

## 2023-08-01 DIAGNOSIS — C79.51 MALIGNANT NEOPLASM METASTATIC TO BONE (HCC): ICD-10-CM

## 2023-08-01 PROCEDURE — 99214 OFFICE O/P EST MOD 30 MIN: CPT | Performed by: INTERNAL MEDICINE

## 2023-08-01 NOTE — PROGRESS NOTES
Hematology Outpatient Follow - Up Note  Dale Rene 68 y.o. male MRN: @ Encounter: [id number]        Date:  8/1/2023        Assessment/ Plan:      Prostate cancer with bone metastasis [place] score 7 diagnosed on 10/2021 previously treated in the outside institution status post external beam radiation therapy 10/2003-12/2003 he received neoadjuvant and adjuvant [facility] for 9 months after radiation therapy observed until July 2008 with a rising PSA and bony metastasis initiated on Lupron with disease progression in 2017 enzalutamide was added he received palliative radiation therapy to the sacrum completed in February 2019 and enzalutamide was added with disease progression in December 2019 he received docetaxel in March 2020 for a year with progression of disease     Currently on  first dose on May 13, 2023    PSA came down significantly after 3 cycles to 32, continue treatment for total of 6 cycles    Multiple medical problems including congestive heart failure, pleural vascular disease, he is on palliative care    PSA after finishing a total of 6 cycles        Labs and imaging studies are reviewed by ordering provider once results are available. If there are findings that need immediate attention, you will be contacted when results available. Discussing results and the implication on your healthcare is best discussed in person at your follow-up visit. HPI:    68year old male with multiple medical conditions including a significant cardiovascular history, heart failure, PAD. He has [facility] since 10/2021 for stage IV prostate cancer. He was previously treated at an outside institution.  Per review of his EMR, he was initially diagnosed with a Francisco Javier 7 prostate cancer s/p treatment with external beam radiation from 10/2003 to 12/2003.  He also got neoadjuvant and adjuvant [facility] for 9 months after radiation.  He was observed until July of 2008 when he was noted to have rising PSA and imaging done at that time showed new bony lesions.  He was restarted on Lupron.  He had evidence of disease progression on imaging completed in June of 2017. Enzalutamide was added to his regimen.  His PSA continued to rise and he had progressive bony metastases including to the sacrum for which he received a course of palliative radiation, which was completed in February of 2019. He was then restarted on enzalutamide but a scan in December of 2019 showed worsening bone metastases so he was started on docetaxel in March of 2020. He was kept on this for approximately a year with his PSA slowly rising. Sotero Lynch was admitted to a hospital for cardiac issues and then was lost to follow up with his previous oncologist.     Establish care with Dr. Higinio Hines initiated on Lupron every 3 months, he received good LV go from 11/2021-4/2022 rechallenged with enzalutamide and Olinda Luther was added for bone metastasis in August 2022     He received Pluvicto the first dose on May 18  2023    After 3 cycles PSA came down to 32  Interval History:        Previous Treatment:         Test Results:    Imaging: VAS lower limb arterial duplex, limited/unilateral    Result Date: 7/26/2023  Narrative:  [facility] REPORT CLINICAL: Indications:  Right Atherosclerosis with Ulceration [I70.25]. Initial Post-op evaluation s/p revascularization. Patient is asymptomatic at this time. Operative History: 2023-04-18 Right Right lower extremity angiogram Coronary stent Right common femoral endarterectomy TAVR  Risk Factors The patient has history of Obesity, Hyperlipidemia, CAD and previous smoking (quit >10yrs ago). Clinical Right Pressure:  112/ mm Hg, Left Pressure:  104/ mm Hg.   FINDINGS:  Right                  PSV (cm/s)  Common Femoral Artery         142  Prox Profunda                 114  Prox SFA - Stent              114  Mid SFA - Stent               129  Dist SFA - Stent              117  Proximal Pop 44  Distal Pop                     46  Dist Post Tibial               48  Dist. Ant. Tibial              33     CONCLUSION: Impression: RIGHT LOWER LIMB: Evaluation shows a patent superficial femoral artery S/P Stent placement. Ankle/Brachial index: 0.58 (Severe Range) Prior:  Non-compressible. Metatarsal pressure of 63mmHg. Prior Not recorded. Great toe pressure of 29mmHg, within the healing range. Prior Not recorded. PVR/ PPG tracings are dampened. LEFT LOWER LIMB: Ankle/Brachial index: 1.56, (Unreliable). Prior: Non-compressible. Metatarsal pressure of 84mmHg. Prior 116mmHg. Great toe pressure of 34 mmHg, within the healing range. Prior 71mmHG. PVR/ PPG tracings are dampened. Compared to previous study on 04/05/23 , there is a patent right superficial femoral artery and stent. SIGNATURE: Electronically Signed by: Mai Kyle MD, RPVI on 2023-07-26 02:25:26 PM      Labs:   Lab Results   Component Value Date    WBC 6.80 06/21/2023    HGB 10.8 (L) 06/21/2023    HCT 35.4 (L) 06/21/2023    MCV 91 06/21/2023     06/21/2023     Lab Results   Component Value Date    K 4.1 07/31/2023     07/31/2023    CO2 26 07/31/2023    BUN 28 (H) 07/31/2023    CREATININE 0.90 07/31/2023    GLUF 111 (H) 05/15/2023    CALCIUM 8.6 07/31/2023    CORRECTEDCA 8.2 (L) 04/10/2023    AST 11 (L) 07/31/2023    ALT 9 07/31/2023    ALKPHOS 108 (H) 07/31/2023    EGFR 82 07/31/2023       No results found for: "IRON", "TIBC", "FERRITIN"    No results found for: "YOPBLWRA17"      ROS: Review of Systems   Constitutional: Negative. Negative for appetite change, chills, diaphoresis, fatigue, fever and unexpected weight change. HENT:   Positive for hearing loss. Negative for lump/mass, mouth sores, nosebleeds, sore throat, trouble swallowing and voice change. Eyes: Negative. Negative for eye problems and icterus. Respiratory: Negative. Negative for chest tightness, cough, hemoptysis and shortness of breath. Cardiovascular: Negative for chest pain and leg swelling. Gastrointestinal: Negative for abdominal distention, abdominal pain, blood in stool, constipation, diarrhea and nausea. Endocrine: Negative. Genitourinary: Negative for dysuria, frequency, hematuria and pelvic pain. Musculoskeletal: Positive for arthralgias. Negative for back pain, flank pain, gait problem, myalgias and neck stiffness. Skin: Negative for itching and rash. Neurological: Negative for dizziness, gait problem, headaches, light-headedness, numbness and speech difficulty. Hematological: Negative for adenopathy. Does not bruise/bleed easily. Psychiatric/Behavioral: Negative for confusion, decreased concentration, depression and sleep disturbance. The patient is not nervous/anxious. Current Medications: Reviewed  Allergies: Reviewed  PMH/FH/SH:  Reviewed      Physical Exam:    Body surface area is 2.02 meters squared. Wt Readings from Last 3 Encounters:   08/01/23 90 kg (198 lb 6.4 oz)   07/31/23 88.9 kg (196 lb)   07/17/23 88.5 kg (195 lb)        Temp Readings from Last 3 Encounters:   08/01/23 (!) 97 °F (36.1 °C)   07/31/23 98.5 °F (36.9 °C)   06/15/23 97.8 °F (36.6 °C) (Temporal)        BP Readings from Last 3 Encounters:   08/01/23 110/80   07/31/23 96/60   07/17/23 116/64         Pulse Readings from Last 3 Encounters:   08/01/23 80   07/31/23 77   07/17/23 73        Physical Exam  Vitals reviewed. Constitutional:       General: He is not in acute distress. Appearance: He is well-developed. He is not diaphoretic. HENT:      Head: Normocephalic and atraumatic. Eyes:      Conjunctiva/sclera: Conjunctivae normal.   Neck:      Trachea: No tracheal deviation. Cardiovascular:      Rate and Rhythm: Regular rhythm. Tachycardia present. Heart sounds: Murmur heard. No friction rub. No gallop. Pulmonary:      Effort: Pulmonary effort is normal. No respiratory distress.       Breath sounds: Normal breath sounds. No wheezing or rales. Chest:      Chest wall: No tenderness. Abdominal:      General: There is no distension. Palpations: Abdomen is soft. Tenderness: There is no abdominal tenderness. Musculoskeletal:      Cervical back: Normal range of motion and neck supple. Right lower leg: Edema present. Left lower leg: Edema present. Lymphadenopathy:      Cervical: No cervical adenopathy. Skin:     General: Skin is warm and dry. Coloration: Skin is not pale. Findings: No erythema. Neurological:      Mental Status: He is alert and oriented to person, place, and time. Psychiatric:         Behavior: Behavior normal.         Thought Content: Thought content normal.         Judgment: Judgment normal.         ECO    Goals and Barriers:  Current Goal: Minimize effects of disease. Barriers: None. Patient's Capacity to Self Care:  Patient is able to self care.     Code Status: [unfilled]

## 2023-08-02 ENCOUNTER — HOSPITAL ENCOUNTER (OUTPATIENT)
Dept: NON INVASIVE DIAGNOSTICS | Facility: HOSPITAL | Age: 78
Discharge: HOME | End: 2023-08-02
Attending: INTERNAL MEDICINE
Payer: COMMERCIAL

## 2023-08-02 VITALS
HEART RATE: 80 BPM | WEIGHT: 198 LBS | BODY MASS INDEX: 31.08 KG/M2 | HEIGHT: 67 IN | DIASTOLIC BLOOD PRESSURE: 80 MMHG | SYSTOLIC BLOOD PRESSURE: 110 MMHG

## 2023-08-02 DIAGNOSIS — I50.22 CHRONIC HFREF (HEART FAILURE WITH REDUCED EJECTION FRACTION) (HCC): ICD-10-CM

## 2023-08-02 LAB
AORTIC ROOT: 3 CM
AORTIC VALVE MEAN VELOCITY: 13.4 M/S
APICAL FOUR CHAMBER EJECTION FRACTION: 32 %
AV AREA BY CONTINUOUS VTI: 1.1 CM2
AV AREA PEAK VELOCITY: 1.3 CM2
AV LVOT MEAN GRADIENT: 3 MMHG
AV LVOT PEAK GRADIENT: 5 MMHG
AV MEAN GRADIENT: 9 MMHG
AV PEAK GRADIENT: 16 MMHG
AV VALVE AREA: 1.07 CM2
AV VELOCITY RATIO: 0.56
DOP CALC AO PEAK VEL: 2.03 M/S
DOP CALC AO VTI: 45.76 CM
DOP CALC LVOT AREA: 2.27 CM2
DOP CALC LVOT CARDIAC INDEX: 1.73 L/MIN/M2
DOP CALC LVOT CARDIAC OUTPUT: 3.5 L/MIN
DOP CALC LVOT DIAMETER: 1.7 CM
DOP CALC LVOT PEAK VEL VTI: 21.57 CM
DOP CALC LVOT PEAK VEL: 1.14 M/S
DOP CALC LVOT STROKE INDEX: 24.8 ML/M2
DOP CALC LVOT STROKE VOLUME: 48.93
E WAVE DECELERATION TIME: 232 MS
FRACTIONAL SHORTENING: 46 (ref 28–44)
INTERVENTRICULAR SEPTUM IN DIASTOLE (PARASTERNAL SHORT AXIS VIEW): 1.7 CM
INTERVENTRICULAR SEPTUM: 1.7 CM (ref 0.6–1.1)
LAAS-AP2: 16.5 CM2
LAAS-AP4: 23 CM2
LEFT ATRIUM SIZE: 4.8 CM
LEFT ATRIUM VOLUME (MOD BIPLANE): 67 ML
LEFT INTERNAL DIMENSION IN SYSTOLE: 2.5 CM (ref 2.1–4)
LEFT VENTRICULAR INTERNAL DIMENSION IN DIASTOLE: 4.6 CM (ref 3.5–6)
LEFT VENTRICULAR POSTERIOR WALL IN END DIASTOLE: 1.5 CM
LEFT VENTRICULAR STROKE VOLUME: 74 ML
LVSV (TEICH): 74 ML
MV E'TISSUE VEL-SEP: 5 CM/S
MV PEAK A VEL: 0.96 M/S
MV PEAK E VEL: 73 CM/S
MV STENOSIS PRESSURE HALF TIME: 67 MS
MV VALVE AREA P 1/2 METHOD: 3.28
RA PRESSURE ESTIMATED: 5 MMHG
RIGHT ATRIUM AREA SYSTOLE A4C: 15.7 CM2
RIGHT VENTRICLE ID DIMENSION: 4.1 CM
RV PSP: 20 MMHG
SL CV LEFT ATRIUM LENGTH A2C: 4.4 CM
SL CV PED ECHO LEFT VENTRICLE DIASTOLIC VOLUME (MOD BIPLANE) 2D: 96 ML
SL CV PED ECHO LEFT VENTRICLE SYSTOLIC VOLUME (MOD BIPLANE) 2D: 23 ML
TR MAX PG: 15 MMHG
TR PEAK VELOCITY: 1.9 M/S
TRICUSPID ANNULAR PLANE SYSTOLIC EXCURSION: 2.3 CM
TRICUSPID VALVE PEAK REGURGITATION VELOCITY: 1.94 M/S

## 2023-08-02 PROCEDURE — 93306 TTE W/DOPPLER COMPLETE: CPT

## 2023-08-02 PROCEDURE — 93306 TTE W/DOPPLER COMPLETE: CPT | Performed by: INTERNAL MEDICINE

## 2023-08-03 DIAGNOSIS — G62.9 PERIPHERAL POLYNEUROPATHY: Chronic | ICD-10-CM

## 2023-08-08 ENCOUNTER — APPOINTMENT (OUTPATIENT)
Dept: LAB | Facility: CLINIC | Age: 78
End: 2023-08-08
Payer: COMMERCIAL

## 2023-08-08 LAB
ALBUMIN SERPL BCP-MCNC: 4.2 G/DL (ref 3.5–5)
ALP SERPL-CCNC: 117 U/L (ref 34–104)
ALT SERPL W P-5'-P-CCNC: 9 U/L (ref 7–52)
ANION GAP SERPL CALCULATED.3IONS-SCNC: 9 MMOL/L
AST SERPL W P-5'-P-CCNC: 11 U/L (ref 13–39)
BASOPHILS # BLD AUTO: 0.03 THOUSANDS/ÂΜL (ref 0–0.1)
BASOPHILS NFR BLD AUTO: 1 % (ref 0–1)
BILIRUB SERPL-MCNC: 0.55 MG/DL (ref 0.2–1)
BUN SERPL-MCNC: 23 MG/DL (ref 5–25)
CALCIUM SERPL-MCNC: 8.7 MG/DL (ref 8.4–10.2)
CHLORIDE SERPL-SCNC: 104 MMOL/L (ref 96–108)
CO2 SERPL-SCNC: 27 MMOL/L (ref 21–32)
CREAT SERPL-MCNC: 0.96 MG/DL (ref 0.6–1.3)
EOSINOPHIL # BLD AUTO: 0.06 THOUSAND/ÂΜL (ref 0–0.61)
EOSINOPHIL NFR BLD AUTO: 1 % (ref 0–6)
ERYTHROCYTE [DISTWIDTH] IN BLOOD BY AUTOMATED COUNT: 17 % (ref 11.6–15.1)
GFR SERPL CREATININE-BSD FRML MDRD: 75 ML/MIN/1.73SQ M
GLUCOSE SERPL-MCNC: 78 MG/DL (ref 65–140)
HCT VFR BLD AUTO: 37.5 % (ref 36.5–49.3)
HGB BLD-MCNC: 11.7 G/DL (ref 12–17)
IMM GRANULOCYTES # BLD AUTO: 0.05 THOUSAND/UL (ref 0–0.2)
IMM GRANULOCYTES NFR BLD AUTO: 1 % (ref 0–2)
LYMPHOCYTES # BLD AUTO: 0.98 THOUSANDS/ÂΜL (ref 0.6–4.47)
LYMPHOCYTES NFR BLD AUTO: 15 % (ref 14–44)
MCH RBC QN AUTO: 29.3 PG (ref 26.8–34.3)
MCHC RBC AUTO-ENTMCNC: 31.2 G/DL (ref 31.4–37.4)
MCV RBC AUTO: 94 FL (ref 82–98)
MONOCYTES # BLD AUTO: 0.37 THOUSAND/ÂΜL (ref 0.17–1.22)
MONOCYTES NFR BLD AUTO: 6 % (ref 4–12)
NEUTROPHILS # BLD AUTO: 4.99 THOUSANDS/ÂΜL (ref 1.85–7.62)
NEUTS SEG NFR BLD AUTO: 76 % (ref 43–75)
NRBC BLD AUTO-RTO: 0 /100 WBCS
PLATELET # BLD AUTO: 243 THOUSANDS/UL (ref 149–390)
PMV BLD AUTO: 9.9 FL (ref 8.9–12.7)
POTASSIUM SERPL-SCNC: 4.7 MMOL/L (ref 3.5–5.3)
PROT SERPL-MCNC: 6.5 G/DL (ref 6.4–8.4)
RBC # BLD AUTO: 4 MILLION/UL (ref 3.88–5.62)
SODIUM SERPL-SCNC: 140 MMOL/L (ref 135–147)
WBC # BLD AUTO: 6.48 THOUSAND/UL (ref 4.31–10.16)

## 2023-08-09 DIAGNOSIS — I73.9 PAD (PERIPHERAL ARTERY DISEASE) (HCC): ICD-10-CM

## 2023-08-09 RX ORDER — GABAPENTIN 300 MG/1
CAPSULE ORAL
Qty: 120 CAPSULE | Refills: 0 | Status: SHIPPED | OUTPATIENT
Start: 2023-08-09

## 2023-08-10 ENCOUNTER — HOSPITAL ENCOUNTER (OUTPATIENT)
Dept: NUCLEAR MEDICINE | Facility: HOSPITAL | Age: 78
Discharge: HOME | End: 2023-08-10
Payer: COMMERCIAL

## 2023-08-10 DIAGNOSIS — C61 MALIGNANT NEOPLASM OF PROSTATE (HCC): ICD-10-CM

## 2023-08-10 PROCEDURE — 79101 NUCLEAR RX IV ADMIN: CPT

## 2023-08-10 PROCEDURE — A9607 HB LUTETIUM LU 177 VIPIVOTIDE TETRAXETAN, THERAPEUTIC, 1 MILLICURIE: HCPCS

## 2023-08-15 ENCOUNTER — PATIENT OUTREACH (OUTPATIENT)
Dept: CASE MANAGEMENT | Facility: OTHER | Age: 78
End: 2023-08-15

## 2023-08-15 NOTE — PROGRESS NOTES
OSW placed outreach TC to pts son, Alondra Garcia, this afternoon to introduce self as his SW. OSW received his VM. Detailed VM was left encouraging a return TC with any questions/concerns.

## 2023-08-17 DIAGNOSIS — G89.29 CHRONIC MIDLINE LOW BACK PAIN: ICD-10-CM

## 2023-08-17 DIAGNOSIS — M54.50 CHRONIC MIDLINE LOW BACK PAIN: ICD-10-CM

## 2023-08-17 DIAGNOSIS — G62.9 PERIPHERAL POLYNEUROPATHY: Chronic | ICD-10-CM

## 2023-08-17 RX ORDER — METHOCARBAMOL 500 MG/1
500 TABLET, FILM COATED ORAL 3 TIMES DAILY
Qty: 90 TABLET | Refills: 0 | Status: SHIPPED | OUTPATIENT
Start: 2023-08-17

## 2023-08-18 ENCOUNTER — TELEPHONE (OUTPATIENT)
Dept: HEMATOLOGY ONCOLOGY | Facility: CLINIC | Age: 78
End: 2023-08-18

## 2023-08-18 NOTE — TELEPHONE ENCOUNTER
Spoke with pt who confirms he is taking Zytiga daily and prednisone twice a day. Denies any issues. Will check in again in 1-2 months.

## 2023-08-21 ENCOUNTER — HOSPITAL ENCOUNTER (OUTPATIENT)
Dept: INFUSION CENTER | Facility: CLINIC | Age: 78
Discharge: HOME | End: 2023-08-21
Payer: COMMERCIAL

## 2023-08-21 DIAGNOSIS — Z95.828 PORT-A-CATH IN PLACE: Primary | ICD-10-CM

## 2023-08-21 PROCEDURE — 96523 IRRIG DRUG DELIVERY DEVICE: CPT

## 2023-08-21 NOTE — PROGRESS NOTES
Patient presents for port flush. Port accessed without difficulty, flushes and aspirates freely. Port saline locked and de-accessed.  Appointment made for labs and port flush prior to his next oncology follow up in October, has AVS.

## 2023-08-22 DIAGNOSIS — I50.22 CHRONIC HFREF (HEART FAILURE WITH REDUCED EJECTION FRACTION) (HCC): ICD-10-CM

## 2023-08-22 RX ORDER — LOSARTAN POTASSIUM 25 MG/1
12.5 TABLET ORAL DAILY
Qty: 45 TABLET | Refills: 1 | Status: SHIPPED | OUTPATIENT
Start: 2023-08-22

## 2023-08-23 ENCOUNTER — OFFICE VISIT (OUTPATIENT)
Dept: PALLIATIVE MEDICINE | Facility: CLINIC | Age: 78
End: 2023-08-23
Payer: COMMERCIAL

## 2023-08-23 VITALS
HEART RATE: 97 BPM | OXYGEN SATURATION: 98 % | HEIGHT: 67 IN | WEIGHT: 205.25 LBS | RESPIRATION RATE: 18 BRPM | BODY MASS INDEX: 32.21 KG/M2 | DIASTOLIC BLOOD PRESSURE: 68 MMHG | SYSTOLIC BLOOD PRESSURE: 128 MMHG | TEMPERATURE: 98.6 F

## 2023-08-23 DIAGNOSIS — Z51.5 PALLIATIVE CARE PATIENT: ICD-10-CM

## 2023-08-23 DIAGNOSIS — G89.3 CANCER-RELATED PAIN: Chronic | ICD-10-CM

## 2023-08-23 DIAGNOSIS — I73.9 PAD (PERIPHERAL ARTERY DISEASE) (HCC): ICD-10-CM

## 2023-08-23 PROCEDURE — 99214 OFFICE O/P EST MOD 30 MIN: CPT | Performed by: FAMILY MEDICINE

## 2023-08-23 RX ORDER — GABAPENTIN 300 MG/1
600 CAPSULE ORAL 2 TIMES DAILY
Qty: 120 CAPSULE | Refills: 0 | Status: SHIPPED | OUTPATIENT
Start: 2023-08-23

## 2023-08-23 RX ORDER — OXYCODONE HYDROCHLORIDE 5 MG/1
5 TABLET ORAL 3 TIMES DAILY PRN
Qty: 90 TABLET | Refills: 0 | Status: SHIPPED | OUTPATIENT
Start: 2023-08-23

## 2023-08-23 NOTE — Clinical Note
Pt is a vulnerable adult, and needs coordination of his care to a greater degree. He no longer effectively coordinates his visits with the [place]. He is still driving when [employer] doesn't pick him up. Can you 1) refer to local Ophtho specialty, and 2) assist with community transport resources?

## 2023-08-23 NOTE — PROGRESS NOTES
Outpatient Follow-Up - Palliative and Supportive Care   Cuca Rene 68 y.o. male [id number]    Assessment & Plan  Problem List Items Addressed This Visit    None    Medications adjusted this encounter:  Requested Prescriptions      No prescriptions requested or ordered in this encounter     No orders of the defined types were placed in this encounter. There are no discontinued medications. - Ongoing close f/up in Palliative clinic. Next visit 4 wks. - Reduced opioids today. Pt's last Rx of oxyIR was nearly 2mos ago, for 120 tabs. New Rx will only be #90.     = Plan to taper to off unless pt receives more oversight, support from family, or he is admitted to a SNF. As I have documented for months, there are serious safety concerns, and as of today, I feel that opioids are more risk than benefit to him. - Oncology Care:  Re-est care with Dr. Luis F Peres. Previous items include:   = Financial difficulties   = Transport - pt is an unsafe . Relies on [employer] for all support. Family unhelpful. Report to Natalya made online today. = Hard of hearing - would benefit from hearing aid   = Fixing stairglide/chairlift in pt's home. Confirmed that this was resolved and improved by home health RN 3/30/23.  - Pt continues to be an ongoing risk for medication misuse:   = blind in one eye, presbycusis, limited medical savvy with very limited oversight from his family in medication checks. = Ongoing work with home health RN to [address] all meds  - Home health : blister packing pt's meds for improved adherence. = Limited health literacy - does not know names of meds; has trouble reading bottles d/t visual impairment. - If none of patient's children can act as financial or medical mohr of atty, for various reasons, we may need to petition for guardianship in order to accomplish the goal of keeping pt in his home, and pursing treatments to fight his cancer.       Royce Rene was seen today for symptoms and planning cares related to above illnesses. I have reviewed the patient's controlled substance dispensing history in the Prescription Drug Monitoring Program in compliance with the [facility] regulations before prescribing any controlled substances. They are invited to continue to follow with us. If there are questions or concerns, please contact us through our clinic/answering service 24 hours a day, seven days a week. Geremias Quiroga  [facility] Palliative and Supportive Care  [phone]      Visit Information    Accompanied By: none    Source of History: Self    History Limitations: limited health literacy, dementia    Contacts: son Gerry Ovalles - [phone]               Daughter Joseph Ly - [phone]  [facility]  [facility]    History of Present Illness      Ranjeet Rene is a 68 y.o. male who presents in follow up of symptoms related to Stage IV prostate cancer, hormone treatment resistant. He follows with Dr. Margy Marin and Ms Laci Vargas for this. There is also a significant cardiovascular history, with heart failure management by Dr. Mulugeta Conley, and PAD with distal pain at rest in both feet. He has an ICD for a h/o VT, and underwent TAVR in 6/2021. Pertinent issues include: symptom management, resource management. Since last visit, PSA continues to be markedly lower than peak -- most recently only 10% of peak --, suggesting ongoing response to therapy. Today in office, he reports that he has a new vision challenge. He has fogginess in the far vision, and cannot read effectively. 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 He presents today with a rollator, and a walking boot on his R foot. The toes are exposed, and the R hallux shows a scaly sore. He does not recall his PCP, nor how to get to their office. He reports that he struggles to coordinate eye care thru the [place].   He does not know how to get care thru their system, though one of his [place] provider told him he would need to see a specialist.    - Heart health - Last visit 7/17/23. Continue medical therapy, obtain echo. 2L fluid restriction, 2g Na restriction. Next visit Nov 2023. - Vascular health - Pt was referred to [facility], and when a call was placed to the pt to schedule, he did not understand the nature of the call, and he declined any f/up. Eventually, this procedure was completed on 4/18/23 by Dr. Christiano Gusman. On 4/26/23, pt saw Dr. Christiano Gusman in office, and it seems that the plan ongoing is for DAPT life-long, and deferral of further cares to Podiatry. Pt has not seen Podiatry since 3/28/23, as of 8/23/2023   - Vulnerable adult - He has had [facility] set up, but he reports [facility] would not take him home from treatments and tests. Has Meals on Wheels weekdaily for midday meal only. He can prep breakfast, but not any other meals. Pt's son presented with him on 3/30/2023, and states that there are no waiver services set up, there are no formal mohr of atty, and that Jama Leon himself cannot be relied upon for transport, coordination, or medical supervision at this time. If waiver services could be gained, Jama Leon would wish to be the beneficiary of caregiver hours. We discussed today that it would then be best to have daughter Alem Lyon have both medical and financial mohr of atty. Alem Lyon has not returned my calls from office. Pt reports he is still driving himself, despite vascular injury to R foot, and his avowed vision and memory challenges. He reports that he is not taking abiraterone / Riesa Mews, because he never received it thru mail. He fears it might have been stolen from his home, off the porch. Since last wt check end of April, pt has lost nearly 20#. This was only two weeks ago. He reports that he is not eating well because he cannot cook for himself.   He reports that his son is getting some store-bought food, but not prepping any meals. Pt reports that his daughter Ricardo Ambriz is unable to devote any time to assist the family. Pt's last son Ebony Hurtado lives in [place], and has not expressed any interest in providing assistance nor support. Pt pays mortgage on his home, and has not added anyone to the deed/title. He reports that this is due to GI bill provisions, and since Alec is not a , he cannot have the home through this mortgage. [facility], and expressed our concerns re: "porch pirates". Pharmacist confirms that abiraterone is NOT supplied by their pharmacy, and that opioids are sign-on-delivery, separate from blisterpacks. Previously, we note that patient has troubles ambulating, and he has visual challenges that should prevent him from driving. He also endorses losing items about his home, and a concern about his memory. Chores about the house are nearly impossible because he cannot bend over, and he can't stand for more than about 3min at a time. This complicates his ability to cook for self as well. Since abscess formed in his R hallux, he is unable to shower, given his inability to stand, as well as not knowing if he can get his foot wet. Pt is and has been homebound for several months      From our first visit on 3/1/22:    "Pt has demonstrated -- over a year ago now -- progression of his illness (rising PSA and decreasing functional status) despite aggressive therapies and cytotoxic treatments. His side effects vs decompensation in his overall illness was so severe he was admitted to [address]  in march 2021. Since that time, he has recovered some function with careful management of his cardiac disease, and less aggressive, less toxic cancer cares. At this time, his treatment plan involves Xofigo for bone-avid disease, +/- Lupron.   We note that the patient has demonstrated some disease progression on other hormonal blockers, but that this plan of care is being offered, if not rec'd, by Dr. Yohana Dominguez. Pt presents to our office with suboptimally controlled pain, ongoing wt loss, poor appetite, and general malaise. His concerns are more related to his back and R foot pain; a Podiatrist today advised him that his unilateral neuropathic symptoms 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.       Today, he can't quite recall his medications, he just insists that he needs 'something stronger than tynlenol'  When we pointed out that he has used tramadol and oxyIR recently, he also stated that these medications were not helpful. Pt endorses support from his son, who will drive pt to appts and on other errands. Pt can't name his meds, but has some vague understanding of the indications for his meds."    Past medical, surgical, social, and family histories are reviewed and pertinent updates are made. Review of Systems   Unable to perform ROS: dementia         Vital Signs    There were no vitals taken for this visit. Physical Exam and Objective Data  Physical Exam  Constitutional:       General: He is not in acute distress. Appearance: He is ill-appearing. He is not toxic-appearing or diaphoretic. Comments: Frail   HENT:      Head: Normocephalic and atraumatic. Right Ear: External ear normal.      Left Ear: External ear normal.   Eyes:      General:         Right eye: No discharge. Left eye: No discharge. Conjunctiva/sclera: Conjunctivae normal.      Pupils: Pupils are equal, round, and reactive to light. Neck:      Trachea: No tracheal deviation. Cardiovascular:      Rate and Rhythm: Regular rhythm. Tachycardia present. Pulmonary:      Effort: Pulmonary effort is normal. No respiratory distress. Breath sounds: No stridor. Abdominal:      Palpations: Abdomen is soft. Comments: scaphoid   Skin:     General: Skin is warm and dry. Findings: No erythema or rash. Neurological:      Mental Status: He is alert.       Cranial Nerves: Cranial nerve deficit (blind in one eye. Presbycusis, baseline) present. Gait: Gait abnormal (using rollator). Comments: Wandering and inconsistent historian. Changes his report of his illness when I challenge him that his driving is unsafe. Poorly oriented to time and situation. Psychiatric:      Comments: Not insightful, poor judgment. Radiology and Laboratory:  I personally reviewed and interpreted the following results: none new    40+ minutes was spent face to face with Royce BUFFY Anju  with greater than 50% of the time spent in counseling or coordination of care including: chart review; symptom pursuit; supportive listening; anticipatory guidance. review and assessment of decisional apparatus and capacity, applicable legal codes and extant documents; . All of the patient's or agent's questions were answered during this discussion.

## 2023-09-07 DIAGNOSIS — M54.50 CHRONIC MIDLINE LOW BACK PAIN: ICD-10-CM

## 2023-09-07 DIAGNOSIS — G89.29 CHRONIC MIDLINE LOW BACK PAIN: ICD-10-CM

## 2023-09-07 DIAGNOSIS — E11.9 DIABETES MELLITUS (HCC): ICD-10-CM

## 2023-09-07 DIAGNOSIS — G62.9 PERIPHERAL POLYNEUROPATHY: Chronic | ICD-10-CM

## 2023-09-08 RX ORDER — METHOCARBAMOL 500 MG/1
500 TABLET, FILM COATED ORAL 3 TIMES DAILY
Qty: 90 TABLET | Refills: 0 | Status: SHIPPED | OUTPATIENT
Start: 2023-09-08

## 2023-09-12 RX ORDER — LANCING DEVICE
EACH MISCELLANEOUS
Qty: 100 EACH | Refills: 2 | Status: SHIPPED | OUTPATIENT
Start: 2023-09-12

## 2023-09-12 RX ORDER — BLOOD-GLUCOSE METER
EACH MISCELLANEOUS
Qty: 1 KIT | Refills: 0 | Status: SHIPPED | OUTPATIENT
Start: 2023-09-12

## 2023-09-13 ENCOUNTER — HOSPITAL ENCOUNTER (OUTPATIENT)
Dept: INFUSION CENTER | Facility: HOSPITAL | Age: 78
Discharge: HOME | End: 2023-09-13
Attending: INTERNAL MEDICINE
Payer: COMMERCIAL

## 2023-09-13 VITALS — BODY MASS INDEX: 33.22 KG/M2 | HEIGHT: 67 IN | WEIGHT: 211.64 LBS

## 2023-09-13 DIAGNOSIS — C79.51 MALIGNANT NEOPLASM METASTATIC TO BONE (HCC): ICD-10-CM

## 2023-09-13 DIAGNOSIS — C61 PROSTATE CANCER (HCC): Primary | ICD-10-CM

## 2023-09-13 DIAGNOSIS — G62.9 PERIPHERAL POLYNEUROPATHY: Chronic | ICD-10-CM

## 2023-09-13 PROCEDURE — 96372 THER/PROPH/DIAG INJ SC/IM: CPT

## 2023-09-13 PROCEDURE — 96402 CHEMO HORMON ANTINEOPL SQ/IM: CPT

## 2023-09-13 RX ADMIN — DENOSUMAB 120 MG: 120 INJECTION SUBCUTANEOUS at 09:38

## 2023-09-13 RX ADMIN — LEUPROLIDE ACETATE 22.5 MG: KIT at 09:38

## 2023-09-13 NOTE — PROGRESS NOTES
Pt offers no new complaints, tolerated lupron to left buttock and xgeva to right upper arm, labs used from 8-8: calcium 8.7, creat clearance 62.7. Confirmed next appts, AVS provided.

## 2023-09-18 ENCOUNTER — TELEPHONE (OUTPATIENT)
Dept: HEMATOLOGY ONCOLOGY | Facility: CLINIC | Age: 78
End: 2023-09-18

## 2023-09-18 DIAGNOSIS — C61 PROSTATE CANCER (HCC): Primary | ICD-10-CM

## 2023-09-18 NOTE — TELEPHONE ENCOUNTER
Spoke with pt for monthly compliance call for Zytiga. Pt states he is taking it every day. When asked how many pills he takes each day he answered "four". Last delivery 8/23 per [facility] documentation.

## 2023-09-19 ENCOUNTER — APPOINTMENT (OUTPATIENT)
Dept: LAB | Facility: CLINIC | Age: 78
End: 2023-09-19
Payer: COMMERCIAL

## 2023-09-19 LAB
ALBUMIN SERPL BCP-MCNC: 4.4 G/DL (ref 3.5–5)
ALP SERPL-CCNC: 106 U/L (ref 34–104)
ALT SERPL W P-5'-P-CCNC: 13 U/L (ref 7–52)
ANION GAP SERPL CALCULATED.3IONS-SCNC: 7 MMOL/L
AST SERPL W P-5'-P-CCNC: 11 U/L (ref 13–39)
BASOPHILS # BLD AUTO: 0.04 THOUSANDS/ÂΜL (ref 0–0.1)
BASOPHILS NFR BLD AUTO: 0 % (ref 0–1)
BILIRUB SERPL-MCNC: 1.04 MG/DL (ref 0.2–1)
BUN SERPL-MCNC: 21 MG/DL (ref 5–25)
CALCIUM SERPL-MCNC: 8.9 MG/DL (ref 8.4–10.2)
CHLORIDE SERPL-SCNC: 108 MMOL/L (ref 96–108)
CO2 SERPL-SCNC: 26 MMOL/L (ref 21–32)
CREAT SERPL-MCNC: 0.89 MG/DL (ref 0.6–1.3)
EOSINOPHIL # BLD AUTO: 0.05 THOUSAND/ÂΜL (ref 0–0.61)
EOSINOPHIL NFR BLD AUTO: 1 % (ref 0–6)
ERYTHROCYTE [DISTWIDTH] IN BLOOD BY AUTOMATED COUNT: 16.2 % (ref 11.6–15.1)
GFR SERPL CREATININE-BSD FRML MDRD: 82 ML/MIN/1.73SQ M
GLUCOSE SERPL-MCNC: 98 MG/DL (ref 65–140)
HCT VFR BLD AUTO: 40.4 % (ref 36.5–49.3)
HGB BLD-MCNC: 12.7 G/DL (ref 12–17)
IMM GRANULOCYTES # BLD AUTO: 0.12 THOUSAND/UL (ref 0–0.2)
IMM GRANULOCYTES NFR BLD AUTO: 1 % (ref 0–2)
LYMPHOCYTES # BLD AUTO: 0.75 THOUSANDS/ÂΜL (ref 0.6–4.47)
LYMPHOCYTES NFR BLD AUTO: 8 % (ref 14–44)
MCH RBC QN AUTO: 30.1 PG (ref 26.8–34.3)
MCHC RBC AUTO-ENTMCNC: 31.4 G/DL (ref 31.4–37.4)
MCV RBC AUTO: 96 FL (ref 82–98)
MONOCYTES # BLD AUTO: 0.58 THOUSAND/ÂΜL (ref 0.17–1.22)
MONOCYTES NFR BLD AUTO: 6 % (ref 4–12)
NEUTROPHILS # BLD AUTO: 8.25 THOUSANDS/ÂΜL (ref 1.85–7.62)
NEUTS SEG NFR BLD AUTO: 84 % (ref 43–75)
NRBC BLD AUTO-RTO: 0 /100 WBCS
PLATELET # BLD AUTO: 238 THOUSANDS/UL (ref 149–390)
PMV BLD AUTO: 9.9 FL (ref 8.9–12.7)
POTASSIUM SERPL-SCNC: 4.8 MMOL/L (ref 3.5–5.3)
PROT SERPL-MCNC: 6.9 G/DL (ref 6.4–8.4)
RBC # BLD AUTO: 4.22 MILLION/UL (ref 3.88–5.62)
SODIUM SERPL-SCNC: 141 MMOL/L (ref 135–147)
WBC # BLD AUTO: 9.79 THOUSAND/UL (ref 4.31–10.16)

## 2023-09-19 PROCEDURE — 80053 COMPREHEN METABOLIC PANEL: CPT

## 2023-09-19 PROCEDURE — 85025 COMPLETE CBC W/AUTO DIFF WBC: CPT

## 2023-09-19 PROCEDURE — 36415 COLL VENOUS BLD VENIPUNCTURE: CPT

## 2023-09-21 ENCOUNTER — HOSPITAL ENCOUNTER (OUTPATIENT)
Dept: NUCLEAR MEDICINE | Facility: HOSPITAL | Age: 78
End: 2023-09-21
Payer: COMMERCIAL

## 2023-09-21 DIAGNOSIS — C61 MALIGNANT NEOPLASM OF PROSTATE (HCC): ICD-10-CM

## 2023-09-21 PROCEDURE — A9607 HB LUTETIUM LU 177 VIPIVOTIDE TETRAXETAN, THERAPEUTIC, 1 MILLICURIE: HCPCS

## 2023-09-21 PROCEDURE — 79101 NUCLEAR RX IV ADMIN: CPT

## 2023-09-22 ENCOUNTER — TELEPHONE (OUTPATIENT)
Dept: HEMATOLOGY ONCOLOGY | Facility: CLINIC | Age: 78
End: 2023-09-22

## 2023-09-22 NOTE — TELEPHONE ENCOUNTER
I called Royce regarding an appointment that they have scheduled with Belen Bernheim, PA-C scheduled on 10/24/23     I left a voicemail explaining to patient that this appointment will need to be rescheduled due to a change in the providers schedule. Patient was advised to call [facility] to reschedule.   Another attempt will be made to reschedule     Patient can reschedule to next available

## 2023-09-26 ENCOUNTER — TELEPHONE (OUTPATIENT)
Dept: HEMATOLOGY ONCOLOGY | Facility: CLINIC | Age: 78
End: 2023-09-26

## 2023-09-26 ENCOUNTER — TELEPHONE (OUTPATIENT)
Age: 78
End: 2023-09-26

## 2023-09-26 ENCOUNTER — OFFICE VISIT (OUTPATIENT)
Dept: PALLIATIVE MEDICINE | Facility: CLINIC | Age: 78
End: 2023-09-26
Payer: COMMERCIAL

## 2023-09-26 VITALS
BODY MASS INDEX: 33.67 KG/M2 | DIASTOLIC BLOOD PRESSURE: 58 MMHG | HEIGHT: 67 IN | HEART RATE: 70 BPM | SYSTOLIC BLOOD PRESSURE: 100 MMHG | OXYGEN SATURATION: 96 % | WEIGHT: 214.51 LBS | TEMPERATURE: 96.4 F

## 2023-09-26 DIAGNOSIS — I50.42 CHRONIC COMBINED SYSTOLIC AND DIASTOLIC CHF (CONGESTIVE HEART FAILURE) (HCC): ICD-10-CM

## 2023-09-26 DIAGNOSIS — Z51.5 PALLIATIVE CARE PATIENT: ICD-10-CM

## 2023-09-26 DIAGNOSIS — F01.B0 MODERATE VASCULAR DEMENTIA WITHOUT BEHAVIORAL DISTURBANCE, PSYCHOTIC DISTURBANCE, MOOD DISTURBANCE, OR ANXIETY (HCC): Primary | Chronic | ICD-10-CM

## 2023-09-26 DIAGNOSIS — I73.9 PAD (PERIPHERAL ARTERY DISEASE) (HCC): ICD-10-CM

## 2023-09-26 DIAGNOSIS — G89.3 CANCER-RELATED PAIN: Chronic | ICD-10-CM

## 2023-09-26 DIAGNOSIS — R26.2 AMBULATORY DYSFUNCTION: Chronic | ICD-10-CM

## 2023-09-26 PROCEDURE — 99214 OFFICE O/P EST MOD 30 MIN: CPT | Performed by: FAMILY MEDICINE

## 2023-09-26 RX ORDER — OXYCODONE HYDROCHLORIDE 5 MG/1
5 TABLET ORAL 3 TIMES DAILY PRN
Qty: 90 TABLET | Refills: 0 | Status: SHIPPED | OUTPATIENT
Start: 2023-09-26

## 2023-09-26 RX ORDER — GABAPENTIN 300 MG/1
CAPSULE ORAL
Qty: 120 CAPSULE | Refills: 0 | Status: SHIPPED | OUTPATIENT
Start: 2023-09-26

## 2023-09-26 NOTE — PROGRESS NOTES
Outpatient Follow-Up - Palliative and Supportive Care   Kristy Rene 68 y.o. male [id number]    Assessment & Plan  Problem List Items Addressed This Visit     Cancer-related pain (Chronic)    Relevant Medications    oxyCODONE (ROXICODONE) 5 immediate release tablet    Palliative care patient (Chronic)    Relevant Medications    oxyCODONE (ROXICODONE) 5 immediate release tablet     Medications adjusted this encounter:  Requested Prescriptions     Signed Prescriptions Disp Refills   • oxyCODONE (ROXICODONE) 5 immediate release tablet 90 tablet 0     Sig: Take 1 tablet (5 mg total) by mouth 3 (three) times a day as needed (cancer pain) Max Daily Amount: 15 mg     No orders of the defined types were placed in this encounter. Medications Discontinued During This Encounter   Medication Reason   • oxyCODONE (ROXICODONE) 5 immediate release tablet Reorder      - Ongoing close f/up in Palliative clinic. Next visit 4 wks. - New Rx will only be #90. Consider further reduction at next visit, carmen if ongoing signs of clinical response to therapy. = Plan to taper to off unless pt receives more oversight, support from family, or he is admitted to a SNF. As I have documented for months, there are serious safety concerns, and as of today, I feel that opioids are more risk than benefit to him. - Oncology Care:  Dr. Austin Ayala and Ms Cleo Oropeza. Previous items include:   = Financial difficulties   = Transport - Relies on [employer] for all support. Family unhelpful. Pt's license revoked by [facility].   = Hard of hearing - would benefit from hearing aid   = Fixing stairglide/chairlift in pt's home. Confirmed that this was resolved and improved by home health RN 3/30/23.  - Pt continues to be an ongoing risk for medication misuse:   = blind in one eye, presbycusis, limited medical savvy with very limited oversight from his family in medication checks.    = Meds have been blisterpacked; unclear if pt has spare meds wandering about his home.   = Limited health literacy - does not know names of meds; has trouble reading bottles d/t visual impairment. - If none of patient's children can act as financial or medical mohr of atty, for various reasons, we may need to petition for guardianship in order to accomplish the goal of keeping pt in his home, and pursing treatments to fight his cancer.  - pt would still need PCP. Dio Juvenal was seen today for symptoms and planning cares related to above illnesses. I have reviewed the patient's controlled substance dispensing history in the Prescription Drug Monitoring Program in compliance with the [facility] regulations before prescribing any controlled substances. They are invited to continue to follow with us. If there are questions or concerns, please contact us through our clinic/answering service 24 hours a day, seven days a week. [facility]  [facility] Palliative and Supportive Care  [phone]      Visit Information    Accompanied By: none    Source of History: Self    History Limitations: limited health literacy, dementia    Contacts: son Alessandra Dhaliwal - [phone]               Daughter Uri Alonzo - [phone]  [facility], [address] [place] [address]  for [facility]  for [facility]. History of Present Illness      Shai Rene is a 68 y.o. male who presents in follow up of symptoms related to Stage IV prostate cancer, hormone treatment resistant. He follows with Dr. Laquita Dennis and Ms Claudetta Candy for this. There is also a significant cardiovascular history, with heart failure management by Dr. Odalis Flaherty, and PAD with distal pain at rest in both feet. He has an ICD for a h/o VT, and underwent TAVR in 6/2021. Pertinent issues include: symptom management, resource management. Since last visit, we detect that he has gained ~25# over the summer. He reports that he is very sedentary, and eats a lot of snacks. He specifically mentions potato chips. He reports he feels lazy, but his pain is controlled. He was quizzed on his dietary restrictions, and he volunteers no knowledge of ever being advised to restrict nor monitor his intake. Upon further discussion, he demands an increase to his oxyIR, because he had continued to use 4tabs a day, instead of what I instructed at our last visit, and when he ran out, experienced throbbing arms that required him to take an additional 1 or 4mg drug, whose name he does not recall. PDMP shows that no one has dispensed such a drug for opioid pain in months. Phone calls to son, daughter, and [employer] all went unanswered today. LM on all four to call back to coordinate with our office. Discussed case with Ms Anitha Nayak of [address] [place] [address]. Pt's family have been advised that they would need to apply for [facility] Waiver services; since this info was offered, there have been no contacts between [address] [place] [address] support and family. Pt continues to endorse insurance challenges in est primary care, and an inability to be able to navigate the health system. - Heart health - Last visit 7/17/23. Continue medical therapy, obtain echo. 2L fluid restriction, 2g Na restriction. Next visit Nov 2023. - Vascular health - 4/26/23, pt saw Dr. Jeoffrey Eisenmenger in office, and it seems that the plan ongoing is for DAPT life-long, and deferral of further cares to Podiatry. Pt has not seen Podiatry since 3/28/23, as of 8/23/2023. He presents today with a rollator, and a walking boot on his R foot. The toes are exposed, and the R hallux shows a scaly sore. - Vision challenge -  He has fogginess in the far vision, and cannot read effectively. 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 - Vulnerable adult - He has had [facility] set up, but he reports [facility] would not take him home from treatments and tests. Has Meals on Wheels weekdaily for midday meal only.   He can prep breakfast, but not any other meals. Pt's son Clarence Corona presented with him on 3/30/2023, and states that there are no waiver services set up, there are no formal mohr of atty, and that Clarence Corona himself cannot be relied upon for transport, coordination, or medical supervision at this time. If waiver services could be gained, Clarence Corona would wish to be the beneficiary of caregiver hours. We discussed today that it would then be best to have daughter Xin Wiggins have both medical and financial mohr of atty. Xin Wiggins has not returned my calls from office since as recently as 9/26/23. Previously, pt reports that he is not eating well because he cannot cook for himself. He reports that his son is getting some store-bought food, but not prepping any meals. Pt reports that his daughter Xin Wiggins is unable to devote any time to assist the family. Pt's last son Ben Liu lives in [place], and has not expressed any interest in providing assistance nor support. Pt pays mortgage on his home, and has not added anyone to the deed/title. He reports that this is due to GI bill provisions, and since Alec is not a , he cannot have the home through this mortgage. [address] [place] [address], and expressed our concerns re: "porch pirates". Pharmacist confirms that abiraterone is NOT supplied by their pharmacy, and that opioids are sign-on-delivery, separate from blisterpacks. He does not know how to get care thru [facility] system, though one of his [facility] provider told him he would need to see a specialist.      Previously, we note that patient has troubles ambulating, and he has visual challenges that should prevent him from driving. He also endorses losing items about his home, and a concern about his memory. Chores about the house are nearly impossible because he cannot bend over, and he can't stand for more than about 3min at a time. This complicates his ability to cook for self as well.   Since abscess formed in his R hallux, he is unable to shower, given his inability to stand, as well as not knowing if he can get his foot wet. Pt is and has been homebound for several months      From our first visit on 3/1/22:    "Pt has demonstrated -- over a year ago now -- progression of his illness (rising PSA and decreasing functional status) despite aggressive therapies and cytotoxic treatments. His side effects vs decompensation in his overall illness was so severe he was admitted to [address]  in march 2021. Since that time, he has recovered some function with careful management of his cardiac disease, and less aggressive, less toxic cancer cares. At this time, his treatment plan involves Xofigo for bone-avid disease, +/- Lupron. We note that the patient has demonstrated some disease progression on other hormonal blockers, but that this plan of care is being offered, if not rec'd, by Dr. Joellen Correa. Pt presents to our office with suboptimally controlled pain, ongoing wt loss, poor appetite, and general malaise. His concerns are more related to his back and R foot pain; a Podiatrist today advised him that his unilateral neuropathic symptoms 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.       Today, he can't quite recall his medications, he just insists that he needs 'something stronger than tynlenol'  When we pointed out that he has used tramadol and oxyIR recently, he also stated that these medications were not helpful. Pt endorses support from his son, who will drive pt to appts and on other errands. Pt can't name his meds, but has some vague understanding of the indications for his meds."    Past medical, surgical, social, and family histories are reviewed and pertinent updates are made.     Review of Systems   Unable to perform ROS: dementia         Vital Signs    /58 (BP Location: Left arm, Patient Position: Sitting, Cuff Size: Standard)   Pulse 70   Temp (!) 96.4 °F (35.8 °C) (Temporal)   Ht 5' 7" (1.702 m) Wt 97.3 kg (214 lb 8.1 oz)   SpO2 96%   BMI 33.60 kg/m²     Physical Exam and Objective Data  Physical Exam  Constitutional:       General: He is not in acute distress. Appearance: He is ill-appearing. He is not diaphoretic. Comments: Frail   HENT:      Head: Normocephalic and atraumatic. Right Ear: External ear normal.      Left Ear: External ear normal.   Eyes:      General:         Right eye: No discharge. Left eye: No discharge. Conjunctiva/sclera: Conjunctivae normal.      Pupils: Pupils are equal, round, and reactive to light. Neck:      Trachea: No tracheal deviation. Cardiovascular:      Rate and Rhythm: Regular rhythm. Tachycardia present. Pulmonary:      Effort: Pulmonary effort is normal. No respiratory distress. Breath sounds: No stridor. Abdominal:      General: There is distension. Palpations: Abdomen is soft. Musculoskeletal:         General: Deformity (continues to wear a walking boot without a sock on R foot. Toes appear clean, but the skin appears to be autonecrosing; there is a great deal of hemosiderin deposition, and th etissues generally seem hypoperfused) present. Skin:     General: Skin is warm and dry. Coloration: Skin is pale. Findings: No erythema or rash. Comments: Poor turgor   Neurological:      General: No focal deficit present. Mental Status: He is disoriented. Psychiatric:         Mood and Affect: Mood normal.         Behavior: Behavior normal.         Thought Content: Thought content normal.         Judgment: Judgment normal.       Radiology and Laboratory:  I personally reviewed and interpreted the following results: none new    35+ minutes was spent face to face with Royce Rene  with greater than 50% of the time spent in counseling or coordination of care including: chart review; symptom pursuit; supportive listening; anticipatory guidance.   Frail elder assessment of safety and attempts to coordinate cares with insufficient community care apparatus. All of the patient's or agent's questions were answered during this discussion.

## 2023-09-26 NOTE — TELEPHONE ENCOUNTER
Patient's son, Michelle Torres Free left vm returning Dr. Harinder Arreaga call. Michelle Torres asked that he be called back after 2pm in the afternoon, as he cannot speak while at work.

## 2023-09-26 NOTE — TELEPHONE ENCOUNTER
I am reaching out regarding your appointment with Tomeka Garcia on 10/24/23. There has been a change to the providers schedule, and we will need to reschedule your appointment. Person picked up and hung up.     Use SAME DAY [employer]

## 2023-09-27 ENCOUNTER — TELEPHONE (OUTPATIENT)
Dept: HEMATOLOGY ONCOLOGY | Facility: CLINIC | Age: 78
End: 2023-09-27

## 2023-09-27 DIAGNOSIS — M54.50 CHRONIC MIDLINE LOW BACK PAIN: ICD-10-CM

## 2023-09-27 DIAGNOSIS — G89.29 CHRONIC MIDLINE LOW BACK PAIN: ICD-10-CM

## 2023-09-27 RX ORDER — METHOCARBAMOL 500 MG/1
500 TABLET, FILM COATED ORAL 3 TIMES DAILY
Qty: 90 TABLET | Refills: 0 | Status: SHIPPED | OUTPATIENT
Start: 2023-09-27

## 2023-09-27 NOTE — TELEPHONE ENCOUNTER
Appointment Change  Cancel, Reschedule, Change to Virtual      Who are you speaking with? Patient   If it is not the patient, is the caller listed on the communication consent form? Yes   Which provider is the appointment scheduled with? Duncan Quinn PA-C   When was the original appointment scheduled? Please list date and time 10/24/23   At which location is the appointment scheduled to take place? [facility] [place]   Was the appointment rescheduled? Was the appointment changed from an in person visit to a virtual visit? If so, please list the details of the change. 10/11/23   What is the reason for the appointment change?  Provider

## 2023-10-03 DIAGNOSIS — E87.6 HYPOKALEMIA: ICD-10-CM

## 2023-10-03 DIAGNOSIS — I50.42 CHRONIC COMBINED SYSTOLIC AND DIASTOLIC CHF (CONGESTIVE HEART FAILURE) (HCC): ICD-10-CM

## 2023-10-03 RX ORDER — FUROSEMIDE 20 MG/1
20 TABLET ORAL DAILY
Qty: 90 TABLET | Refills: 3 | Status: SHIPPED | OUTPATIENT
Start: 2023-10-03 | End: 2023-11-02

## 2023-10-04 DIAGNOSIS — E11.9 DIABETES MELLITUS (HCC): ICD-10-CM

## 2023-10-04 DIAGNOSIS — G62.9 PERIPHERAL POLYNEUROPATHY: Chronic | ICD-10-CM

## 2023-10-05 RX ORDER — BLOOD-GLUCOSE METER
EACH MISCELLANEOUS
Qty: 1 KIT | Refills: 0 | Status: SHIPPED | OUTPATIENT
Start: 2023-10-05

## 2023-10-09 RX ORDER — POTASSIUM CHLORIDE 20 MEQ/1
20 TABLET, EXTENDED RELEASE ORAL DAILY
Qty: 30 TABLET | Refills: 2 | Status: SHIPPED | OUTPATIENT
Start: 2023-10-09 | End: 2024-01-07

## 2023-10-11 ENCOUNTER — OFFICE VISIT (OUTPATIENT)
Dept: HEMATOLOGY ONCOLOGY | Facility: CLINIC | Age: 78
End: 2023-10-11
Payer: COMMERCIAL

## 2023-10-11 VITALS
TEMPERATURE: 97 F | OXYGEN SATURATION: 99 % | BODY MASS INDEX: 34.59 KG/M2 | RESPIRATION RATE: 17 BRPM | WEIGHT: 220.4 LBS | DIASTOLIC BLOOD PRESSURE: 60 MMHG | HEART RATE: 64 BPM | SYSTOLIC BLOOD PRESSURE: 112 MMHG | HEIGHT: 67 IN

## 2023-10-11 DIAGNOSIS — C61 PROSTATE CANCER (HCC): Primary | ICD-10-CM

## 2023-10-11 PROCEDURE — 99215 OFFICE O/P EST HI 40 MIN: CPT | Performed by: PHYSICIAN ASSISTANT

## 2023-10-11 NOTE — PROGRESS NOTES
Hematology/Oncology Outpatient Follow- up Note  Stone Rene 68 y.o. male MRN: @ Encounter: [id number]        Date:  10/11/2023      Assessment / Plan:    Heavily pretreated metastatic prostate Cancer previously treated at the [facility]. transferred care to [facility] in October 2021. History of Francisco Javier 7 prostate cancer s/p treatment with external beam radiation 7200cGy from 10/14/2003 to 12/11/2003. He also got neoadjuvant and adjuvant LHRH for 9 months after radiation. July 2008 with a rising PSA and bony metastasis identified. initiated on Lupron     PSA was increasing and bone scan identified new lesions  3/13/2015 he was initiated on abiraterone      6/2017 progression of bony metastatic disease on imaging. Treatment changed to enzalutamide. 2/2019 palliative Radiation to sacrum    March 2020 - March 2021 Docetaxel. 3/2021 - 10/2021 no cancer directed treatment as he had significant cardiac issues and was seen in [facility].    10/2021 Transferred care from [facility] to our practice    Lupron every 3 months    Xofigo from 11/4/2021 to 4/2022 5/2022 rechallenged with enzalutamide   8/2022 Bridges Daring was added for bone metastasis     Currently on  first dose on May 13, 2023     PSA came down significantly after 3 cycles to 37 7/10/23 (245 5/15/23)  PSA 24 10/12/23    Dose #5 due 11/2/23     Continue treatment for total of 6 cycles          HPI:   Katia Banuelos is a 68year old male seen initially by Dr. Janeth Ramos October 5, 2021 regarding metastatic castration resistant prostate cancer. He has multiple medical conditions including a significant cardiovascular history, heart failure, PAD. He was previously treated at the [facility]. [facility] he was initially diagnosed with a Eucha 7 prostate cancer s/p treatment with external beam radiation 7200cGy from 10/14/2003 to 12/11/2003. He also got neoadjuvant and adjuvant LHRH for 9 months after radiation.       He was observed until July of 2008 when he was noted to have rising PSA and imaging done at that time showed new bony lesions. He received Zoladex and later leuprolide. PSA was increasing and bone scan identified new lesions. 3/13/2015 he was initiated on abiraterone    6/2017 He had evidence of aggressive bony metastatic disease on imaging. Treatment changed to Enzalutamide. His PSA continued to rise and he had progressive bony metastases including to the sacrum for which he received a course of palliative radiation, which was completed in February of 2019. It was 3000 cGy as of radiation in 10 fractions. He was then restarted on enzalutamide but a scan in December of 2019 showed worsening bone metastases so he was started on docetaxel in March of 2020. He was kept on this for approximately a year with his PSA slowly rising. Cycle 15 1/28/2021. After March 2021, he was admitted to a hospital for cardiac issues and then was lost to follow up with his previous oncologist.     Established care with Dr. Felipe Albert  10/2021.    11/2021 He was initiated on Lupron every 3 months    He was referred to nuclear Medicine and treated with Jhony Hatch from 11/4/2021 to 4/2022 5/2022 rechallenged with enzalutamide   8/2022 Rosina Bell was added for bone metastasis     2/2023  plans to start Pluvicto     He received Pluvicto the first dose on May 18  2023     After 3 cycles PSA came down to 32    Interval History:        Review of Systems   Constitutional:  Negative for appetite change, chills, diaphoresis, fatigue, fever and unexpected weight change. HENT:   Negative for mouth sores, nosebleeds, sore throat, tinnitus and voice change. Eyes:  Negative for eye problems. Respiratory:  Negative for chest tightness, cough, shortness of breath and wheezing. Cardiovascular:  Negative for chest pain, leg swelling and palpitations.    Gastrointestinal:  Negative for abdominal distention, abdominal pain, blood in stool, constipation, diarrhea, nausea, rectal pain and vomiting. Endocrine: Negative for hot flashes. Genitourinary: Negative. Musculoskeletal:  Positive for arthralgias and myalgias. Negative for gait problem. Skin:  Negative for itching and rash. Neurological:  Negative for dizziness, gait problem, headaches, light-headedness and numbness. Hematological:  Negative for adenopathy. Psychiatric/Behavioral:  Negative for confusion and sleep disturbance. The patient is not nervous/anxious. Test Results:        Labs:   Lab Results   Component Value Date    HGB 12.7 09/19/2023    HCT 40.4 09/19/2023    MCV 96 09/19/2023     09/19/2023    WBC 9.79 09/19/2023    NRBC 0 09/19/2023     Lab Results   Component Value Date    K 4.8 09/19/2023     09/19/2023    CO2 26 09/19/2023    BUN 21 09/19/2023    CREATININE 0.89 09/19/2023    GLUF 111 (H) 05/15/2023    CALCIUM 8.9 09/19/2023    CORRECTEDCA 8.2 (L) 04/10/2023    AST 11 (L) 09/19/2023    ALT 13 09/19/2023    ALKPHOS 106 (H) 09/19/2023    EGFR 82 09/19/2023           Imaging: NM pluvicto infusion    Result Date: 9/21/2023  Narrative: PLUVICTO THERAPY INDICATION: Metastatic prostate cancer RADIOPHARMACEUTICAL:  197.4   mCi Lutetium 177 PLUVICTO COMPARISON: 8/10/2023 FINDINGS: Patient is a 73year old male with history of metastatic prostate cancer. Laboratory blood work was drawn on 9/19/2023: Hemoglobin 12.7. Platelets 283. WBC 9.79. Creatinine 0.89. eGFR 82. Albumin 4.4. The benefits, risks and alternatives of this therapy were explained. Written informed consent was obtained. The patient was also given written and oral instructions regarding posttherapy care. 197.4 mCi Lutetium 177 Pluvicto was administered intravenously without incident. Impression: 197.4 mCi Lutetium 177 Pluvicto was administered intravenously without immediate complication.  Workstation performed: [id number]             Allergies: No Known Allergies  Current Medications: Reviewed  PMH/FH/SH: Reviewed      Physical Exam:    There is no height or weight on file to calculate BSA. Ht Readings from Last 3 Encounters:   09/26/23 5' 7" (1.702 m)   09/13/23 5' 7.01" (1.702 m)   08/23/23 5' 7" (1.702 m)        Wt Readings from Last 3 Encounters:   09/26/23 97.3 kg (214 lb 8.1 oz)   09/13/23 96 kg (211 lb 10.3 oz)   08/23/23 93.1 kg (205 lb 4 oz)        Temp Readings from Last 3 Encounters:   09/26/23 (!) 96.4 °F (35.8 °C) (Temporal)   08/23/23 98.6 °F (37 °C) (Temporal)   08/01/23 (!) 97 °F (36.1 °C)        BP Readings from Last 3 Encounters:   09/26/23 100/58   08/23/23 128/68   08/02/23 110/80             Physical Exam  Vitals reviewed. Constitutional:       General: He is not in acute distress. Appearance: He is well-developed. He is not diaphoretic. HENT:      Head: Normocephalic and atraumatic. Eyes:      Conjunctiva/sclera: Conjunctivae normal.   Neck:      Trachea: No tracheal deviation. Cardiovascular:      Rate and Rhythm: Normal rate and regular rhythm. Heart sounds: No murmur heard. No friction rub. No gallop. Pulmonary:      Effort: Pulmonary effort is normal. No respiratory distress. Breath sounds: Normal breath sounds. No wheezing or rales. Chest:      Chest wall: No tenderness. Abdominal:      General: There is no distension. Palpations: Abdomen is soft. Tenderness: There is no abdominal tenderness. Musculoskeletal:      Cervical back: Normal range of motion and neck supple. Comments: Right soft shoe   Lymphadenopathy:      Cervical: No cervical adenopathy. Skin:     General: Skin is warm and dry. Coloration: Skin is not pale. Findings: No erythema. Comments: Right soft shoe   Neurological:      General: No focal deficit present. Mental Status: He is alert and oriented to person, place, and time. Psychiatric:         Behavior: Behavior normal.         Thought Content:  Thought content normal.         Judgment: Judgment normal.         ECO      Emergency Contacts:    Extended Emergency Contact Information  Primary Emergency Contact: Rodrigue Rene  Mobile Phone: [phone]  Relation: Son  Secondary Emergency Contact: Kush Downey  Mobile Phone: [phone]  Relation: Daughter  Preferred language: [place]

## 2023-10-12 ENCOUNTER — APPOINTMENT (OUTPATIENT)
Dept: LAB | Facility: CLINIC | Age: 78
End: 2023-10-12
Payer: COMMERCIAL

## 2023-10-12 DIAGNOSIS — C61 PROSTATE CANCER (HCC): ICD-10-CM

## 2023-10-12 DIAGNOSIS — C79.51 MALIGNANT NEOPLASM METASTATIC TO BONE (HCC): ICD-10-CM

## 2023-10-12 LAB — PSA SERPL-MCNC: 23.65 NG/ML (ref 0–4)

## 2023-10-12 PROCEDURE — 36415 COLL VENOUS BLD VENIPUNCTURE: CPT

## 2023-10-12 PROCEDURE — 84153 ASSAY OF PSA TOTAL: CPT

## 2023-10-17 DIAGNOSIS — I73.9 PAD (PERIPHERAL ARTERY DISEASE) (HCC): ICD-10-CM

## 2023-10-17 RX ORDER — GABAPENTIN 300 MG/1
CAPSULE ORAL
Qty: 120 CAPSULE | Refills: 0 | Status: SHIPPED | OUTPATIENT
Start: 2023-10-17

## 2023-10-18 ENCOUNTER — TELEPHONE (OUTPATIENT)
Dept: HEMATOLOGY ONCOLOGY | Facility: CLINIC | Age: 78
End: 2023-10-18

## 2023-10-18 NOTE — TELEPHONE ENCOUNTER
Spoke with pt who states he is taking Zytiga four pills a day. Last delivery 10/19 per [facility]. Pt denies any questions or concerns. Will check back in with him 1-2 months.

## 2023-10-23 ENCOUNTER — HOSPITAL ENCOUNTER (OUTPATIENT)
Dept: INFUSION CENTER | Facility: CLINIC | Age: 78
Discharge: HOME | End: 2023-10-23
Payer: COMMERCIAL

## 2023-10-23 DIAGNOSIS — Z95.828 PORT-A-CATH IN PLACE: Primary | ICD-10-CM

## 2023-10-23 PROCEDURE — 96523 IRRIG DRUG DELIVERY DEVICE: CPT

## 2023-10-23 NOTE — PATIENT INSTRUCTIONS
October 2023 Sunday Monday Tuesday Wednesday Thursday Friday Saturday   1     2     3     4     5     6     7       8     9     10     11    FOLLOW UP PG  12:15 PM   (30 min.)   MELODIE Felix [facility] [place] 12    LAB WALK IN   9:40 AM   (5 min.)   AN MOB  [facility] 13     14       15     16     17     18     19     20     21       22     23    INF CATHETER MAINTENANCE  10:30 AM   (60 min.)   AN INF QUICK CHAIR 01 2020 [facility] 24     25     26    FOLLOW UP PG   9:45 AM   (40 min.)   Christian Aguirre MD   [address] 08     64       24     98     68                                     November 2023 Sunday Monday Tuesday Wednesday Thursday Friday Saturday                  1    NEW PATIENT PG   7:45 AM   (30 min.)   Darwin Wright MD   [facility] 2    NM PLUVICTO INFUSION  10:00 AM   (120 min.)    PET [address] [facility] Heart PET Scan 3     4       5     6     7     8     9     10     11       12     13     14     15     16  Happy Birthday!     REMOTE DEVICE CHECK PG   7:45 AM   (15 min.)   [address] [facility] Cardiology [facility] 17     55       71     32    OFFICE VISIT SHORT PG  10:25 AM   (20 min.)   Deanna Garcia MD   [address] [facility] 21     22     23     24     25       26     27     28     29     30 Writer attempted to contact pt's mother at [phone]. Writer received VM with message left with phone number for return call provided.

## 2023-10-23 NOTE — PROGRESS NOTES
Patient here for port flush and is well, no c/o or changes to report. He tolerated procedure without incident and was discharged. He will RTO 12/6/23 for same with labs as well as xgeva and lupron.

## 2023-10-26 ENCOUNTER — OFFICE VISIT (OUTPATIENT)
Dept: PALLIATIVE MEDICINE | Facility: CLINIC | Age: 78
End: 2023-10-26
Payer: COMMERCIAL

## 2023-10-26 VITALS
HEART RATE: 83 BPM | DIASTOLIC BLOOD PRESSURE: 70 MMHG | OXYGEN SATURATION: 96 % | RESPIRATION RATE: 18 BRPM | BODY MASS INDEX: 35.78 KG/M2 | TEMPERATURE: 96.9 F | HEIGHT: 67 IN | SYSTOLIC BLOOD PRESSURE: 128 MMHG | WEIGHT: 227.96 LBS

## 2023-10-26 DIAGNOSIS — M54.50 CHRONIC MIDLINE LOW BACK PAIN: ICD-10-CM

## 2023-10-26 DIAGNOSIS — G89.3 CANCER-RELATED PAIN: Chronic | ICD-10-CM

## 2023-10-26 DIAGNOSIS — I50.42 CHRONIC COMBINED SYSTOLIC AND DIASTOLIC CHF (CONGESTIVE HEART FAILURE) (HCC): ICD-10-CM

## 2023-10-26 DIAGNOSIS — G89.29 CHRONIC MIDLINE LOW BACK PAIN: ICD-10-CM

## 2023-10-26 DIAGNOSIS — Z51.5 PALLIATIVE CARE PATIENT: ICD-10-CM

## 2023-10-26 PROCEDURE — 99214 OFFICE O/P EST MOD 30 MIN: CPT | Performed by: FAMILY MEDICINE

## 2023-10-26 RX ORDER — OXYCODONE HYDROCHLORIDE 5 MG/1
5 TABLET ORAL 3 TIMES DAILY PRN
Qty: 90 TABLET | Refills: 0 | Status: SHIPPED | OUTPATIENT
Start: 2023-10-26 | End: 2023-10-26 | Stop reason: SDUPTHER

## 2023-10-26 RX ORDER — FUROSEMIDE 20 MG/1
40 TABLET ORAL DAILY
Qty: 60 TABLET | Refills: 0 | Status: SHIPPED | OUTPATIENT
Start: 2023-10-26

## 2023-10-26 RX ORDER — OXYCODONE HYDROCHLORIDE 5 MG/1
5 TABLET ORAL EVERY 6 HOURS PRN
Qty: 120 TABLET | Refills: 0 | Status: SHIPPED | OUTPATIENT
Start: 2023-10-26

## 2023-10-26 RX ORDER — METHOCARBAMOL 500 MG/1
500 TABLET, FILM COATED ORAL 3 TIMES DAILY
Qty: 90 TABLET | Refills: 0 | Status: SHIPPED | OUTPATIENT
Start: 2023-10-26

## 2023-10-26 NOTE — Clinical Note
Pt is quite a vulnerable person, with poor insight into his illnesses. If you will try his son after 2pm, this may be best way to get additional perspective and competent history.

## 2023-10-26 NOTE — PROGRESS NOTES
Outpatient Follow-Up - Palliative and Supportive Care   Nazia Rene 68 y.o. male [id number]    Assessment & Plan  Problem List Items Addressed This Visit       Cancer-related pain (Chronic)    Relevant Medications    oxyCODONE (ROXICODONE) 5 immediate release tablet    Palliative care patient (Chronic)    Relevant Medications    oxyCODONE (ROXICODONE) 5 immediate release tablet    Chronic combined systolic and diastolic CHF (congestive heart failure) (HCC)    Relevant Medications    furosemide (LASIX) 20 mg tablet    Chronic midline low back pain    Relevant Medications    methocarbamol (ROBAXIN) 500 mg tablet   Medications adjusted this encounter:  Requested Prescriptions     Signed Prescriptions Disp Refills    furosemide (LASIX) 20 mg tablet 60 tablet 0     Sig: Take 2 tablets (40 mg total) by mouth daily    methocarbamol (ROBAXIN) 500 mg tablet 90 tablet 0     Sig: Take 1 tablet (500 mg total) by mouth 3 (three) times a day    oxyCODONE (ROXICODONE) 5 immediate release tablet 120 tablet 0     Sig: Take 1 tablet (5 mg total) by mouth every 6 (six) hours as needed (cancer pain) Max Daily Amount: 20 mg     No orders of the defined types were placed in this encounter. Medications Discontinued During This Encounter   Medication Reason    furosemide (LASIX) 20 mg tablet Reorder    oxyCODONE (ROXICODONE) 5 immediate release tablet Reorder    methocarbamol (ROBAXIN) 500 mg tablet Reorder    oxyCODONE (ROXICODONE) 5 immediate release tablet Reorder      - Ongoing close f/up in Palliative clinic. Next visit 4 wks. - Refill rx will only be #120 this month. Will not further increase while disease is responding to therapy. - Oncology Care:  Dr. Jewel Luu and Ms Luana Padgett. Previous items include:   = Financial difficulties   = Transport - Relies on [employer] for all support. Family unhelpful.   Pt's license revoked by [facility].   = Hard of hearing - would benefit from hearing aid   = Fixing stairglide/chairlift in pt's home.  Confirmed that this was resolved and improved by home health RN 3/30/23.  - Pt continues to be an ongoing risk for medication misuse:   = blind in one eye, presbycusis, limited medical savvy with very limited oversight from his family in medication checks. = Meds have been blisterpacked; unclear if pt has spare meds wandering about his home. = Limited health literacy - does not know names of meds; has trouble reading bottles d/t visual impairment. - If none of patient's children can act as financial or medical mohr of atty, for various reasons, we may need to petition for guardianship in order to accomplish the goal of keeping pt in his home, and pursing treatments to fight his cancer.  - pt would still need PCP. Paulo Joel was seen today for symptoms and planning cares related to above illnesses. I have reviewed the patient's controlled substance dispensing history in the Prescription Drug Monitoring Program in compliance with the [facility] regulations before prescribing any controlled substances. They are invited to continue to follow with us. If there are questions or concerns, please contact us through our clinic/answering service 24 hours a day, seven days a week. [facility]  [phone]      Visit Information    Accompanied By: none    Source of History: Self    History Limitations: limited health literacy, dementia    Contacts: son Donavan House - [phone]               Daughter Zoya Messina - [phone]  [facility], [address] [place] [address]  for [address] [place] [address]  for Aging. History of Present Illness      Viri Rene is a 68 y.o. male who presents in follow up of symptoms related to Stage IV prostate cancer, hormone treatment resistant. He follows with Dr. Stephenie Morales and Ms Ada Bray for this.   There is also a significant cardiovascular history, with heart failure management by Dr. Georgina Simpson, and PAD with distal pain at rest in both feet. He has an ICD for a h/o VT, and underwent TAVR in 6/2021. Pertinent issues include: symptom management, resource management. Since last visit, a new PSA demonstrates remarkable response to therapy; his latest value is <30, with previous peak of 330+. CBC and CMP and diff all appear nominal, and stable, for past several measurements. He has a recent visit with med/Onc, who have advised ongoing therapies with enzalutamide and Xgeva. It appears that he has also recently most benefitted from [address] [place] [address]. He has seen no other providers. His weight continues to climb (new peak today of 103kg / 227#), suggestive of poor adherence to heart failure recs. We noted at our last visit that his weight gain is inconsistent with worsening cancer, and likely inconsistent with improved nutrition and functional health. Rather, his weight gain is accelerating, and is more characteristic of poor heart failure management. As we have documented extensively, the patient lacks perceptural and cognitive ability to manage heart failure independently, and his family do not provide adequate support. The pt still does not have a PCP; there is a pending appointment on 11/1 with Dr. Alexa Mena. He reports that he will eat half a bag of chips a day. Later, a phone call is placed to pt's son, who does not answer the phone. A message is left ot attempt coordination with our office for pt's cares. We are told to try the son after 2pm.      we detect that he has gained ~25# over the summer. He reports that he is very sedentary, and eats a lot of snacks. He specifically mentions potato chips. He reports he feels lazy, but his pain is controlled. He was quizzed on his dietary restrictions, and he volunteers no knowledge of ever being advised to restrict nor monitor his intake.     Upon further discussion, he demands an increase to his oxyIR, because he had continued to use 4tabs a day, instead of what I instructed at our last visit, and when he ran out, experienced throbbing arms that required him to take an additional 1 or 4mg drug, whose name he does not recall. PDMP shows that no one has dispensed such a drug for opioid pain in months. Phone calls to son, daughter, and [employer] all went unanswered today. LM on all four to call back to coordinate with our office. Discussed case with Ms Minda Arredondo of [address] [place] [address]. Pt's family have been advised that they would need to apply for Cty Waiver services; since this info was offered, there have been no contacts between [address] [place] [address] support and family. Pt continues to endorse insurance challenges in est primary care, and an inability to be able to navigate the health system. - Heart health - Last visit 7/17/23. Continue medical therapy, obtain echo. 2L fluid restriction, 2g Na restriction. Next visit Nov 2023. - Vascular health - 4/26/23, pt saw Dr. Sushma Kern in office, and it seems that the plan ongoing is for DAPT life-long, and deferral of further cares to Podiatry. Pt has not seen Podiatry since 3/28/23, as of 8/23/2023. He presents today with a rollator, and a walking boot on his R foot. The toes are exposed, and the R hallux shows a scaly sore. - Vision challenge -  He has fogginess in the far vision, and cannot read effectively. 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 - Vulnerable adult - He has had [facility] [employer] set up, but he reports [facility] would not take him home from treatments and tests. Has Meals on Wheels weekdaily for midday meal only. He can prep breakfast, but not any other meals. Pt's son Ren Robins presented with him on 3/30/2023, and states that there are no waiver services set up, there are no formal mohr of atty, and that Ren Robins himself cannot be relied upon for transport, coordination, or medical supervision at this time.     If waiver services could be gained, Iesha Hassan would wish to be the beneficiary of caregiver hours. We discussed today that it would then be best to have daughter Katerine Xiong have both medical and financial mohr of atty. Katerine Xiong has not returned my calls from office since as recently as 9/26/23. Previously, pt reports that he is not eating well because he cannot cook for himself. He reports that his son is getting some store-bought food, but not prepping any meals. Pt reports that his daughter Katerine Xiong is unable to devote any time to assist the family. Pt's last son Nolan Ching lives in [place], and has not expressed any interest in providing assistance nor support. Pt pays mortgage on his home, and has not added anyone to the deed/title. He reports that this is due to GI bill provisions, and since Alec is not a , he cannot have the home through this mortgage. [address] [place] [address], and expressed our concerns re: "danny sousa". Pharmacist confirms that abiraterone is NOT supplied by their pharmacy, and that opioids are sign-on-delivery, separate from blisterpacks. He does not know how to get care thru [facility], though one of his [place] provider told him he would need to see a specialist.      Previously, we note that patient has troubles ambulating, and he has visual challenges that should prevent him from driving. He also endorses losing items about his home, and a concern about his memory. Chores about the house are nearly impossible because he cannot bend over, and he can't stand for more than about 3min at a time. This complicates his ability to cook for self as well. Since abscess formed in his R hallux, he is unable to shower, given his inability to stand, as well as not knowing if he can get his foot wet.   Pt is and has been homebound for several months      From our first visit on 3/1/22:    "Pt has demonstrated -- over a year ago now -- progression of his illness (rising PSA and decreasing functional status) despite aggressive therapies and cytotoxic treatments. His side effects vs decompensation in his overall illness was so severe he was admitted to [address] in march 2021. Since that time, he has recovered some function with careful management of his cardiac disease, and less aggressive, less toxic cancer cares. At this time, his treatment plan involves Xofigo for bone-avid disease, +/- Lupron. We note that the patient has demonstrated some disease progression on other hormonal blockers, but that this plan of care is being offered, if not rec'd, by Dr. Jamar Burkett. Pt presents to our office with suboptimally controlled pain, ongoing wt loss, poor appetite, and general malaise. His concerns are more related to his back and R foot pain; a Podiatrist today advised him that his unilateral neuropathic symptoms 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. Today, he can't quite recall his medications, he just insists that he needs 'something stronger than tynlenol'  When we pointed out that he has used tramadol and oxyIR recently, he also stated that these medications were not helpful. Pt endorses support from his son, who will drive pt to appts and on other errands. Pt can't name his meds, but has some vague understanding of the indications for his meds."    Past medical, surgical, social, and family histories are reviewed and pertinent updates are made. Review of Systems   Unable to perform ROS: Dementia         Vital Signs    /70 (BP Location: Left arm, Patient Position: Sitting, Cuff Size: Standard)   Pulse 83   Temp (!) 96.9 °F (36.1 °C) (Temporal)   Resp 18   Ht 5' 7" (1.702 m)   Wt 103 kg (227 lb 15.3 oz)   SpO2 96%   BMI 35.70 kg/m²     Physical Exam and Objective Data  Physical Exam  Constitutional:       General: He is not in acute distress. Appearance: He is ill-appearing. He is not toxic-appearing or diaphoretic.       Comments: Frail   HENT:      Head: Normocephalic and atraumatic. Right Ear: External ear normal.      Left Ear: External ear normal.   Eyes:      General:         Right eye: No discharge. Left eye: No discharge. Conjunctiva/sclera: Conjunctivae normal.      Pupils: Pupils are equal, round, and reactive to light. Neck:      Trachea: No tracheal deviation. Cardiovascular:      Rate and Rhythm: Regular rhythm. Tachycardia present. Pulmonary:      Effort: Pulmonary effort is normal. No respiratory distress. Breath sounds: No stridor. Wheezing present. Abdominal:      General: There is distension. Palpations: Abdomen is soft. Comments: scaphoid   Skin:     General: Skin is warm and dry. Coloration: Skin is pale. Findings: No erythema or rash. Neurological:      General: No focal deficit present. Mental Status: He is disoriented. Cranial Nerves: No cranial nerve deficit. Psychiatric:      Comments: Limited judgment, poor insight       Radiology and Laboratory:  I personally reviewed and interpreted the following results: none new    35+ minutes was spent face to face with Royce BAER Anju  with greater than 50% of the time spent in counseling or coordination of care including: chart review; symptom pursuit; supportive listening; anticipatory guidance. .  All of the patient's or agent's questions were answered during this discussion.

## 2023-10-26 NOTE — Clinical Note
Pts weight continues to climb, and he denies any adherence to a special diet. I have boosted his Rx for furosemide today. Any additional advice you may have is appreciated.

## 2023-10-30 ENCOUNTER — APPOINTMENT (OUTPATIENT)
Dept: LAB | Facility: CLINIC | Age: 78
End: 2023-10-30
Payer: COMMERCIAL

## 2023-10-31 DIAGNOSIS — I50.22 CHRONIC HFREF (HEART FAILURE WITH REDUCED EJECTION FRACTION) (HCC): ICD-10-CM

## 2023-10-31 RX ORDER — METOPROLOL SUCCINATE 25 MG/1
TABLET, EXTENDED RELEASE ORAL
Qty: 45 TABLET | Refills: 2 | Status: SHIPPED | OUTPATIENT
Start: 2023-10-31

## 2023-11-01 ENCOUNTER — OFFICE VISIT (OUTPATIENT)
Dept: INTERNAL MEDICINE CLINIC | Facility: CLINIC | Age: 78
End: 2023-11-01
Payer: COMMERCIAL

## 2023-11-01 VITALS
DIASTOLIC BLOOD PRESSURE: 88 MMHG | HEIGHT: 67 IN | SYSTOLIC BLOOD PRESSURE: 120 MMHG | OXYGEN SATURATION: 96 % | BODY MASS INDEX: 36.41 KG/M2 | WEIGHT: 232 LBS | HEART RATE: 72 BPM | TEMPERATURE: 98.1 F

## 2023-11-01 DIAGNOSIS — Z79.899 ENCOUNTER FOR LONG-TERM CURRENT USE OF MEDICATION: ICD-10-CM

## 2023-11-01 DIAGNOSIS — E11.9 DIABETES MELLITUS (HCC): ICD-10-CM

## 2023-11-01 DIAGNOSIS — Z23 ENCOUNTER FOR IMMUNIZATION: ICD-10-CM

## 2023-11-01 DIAGNOSIS — C79.51 MALIGNANT NEOPLASM METASTATIC TO BONE (HCC): ICD-10-CM

## 2023-11-01 DIAGNOSIS — I73.9 PAD (PERIPHERAL ARTERY DISEASE) (HCC): ICD-10-CM

## 2023-11-01 DIAGNOSIS — R26.2 AMBULATORY DYSFUNCTION: Chronic | ICD-10-CM

## 2023-11-01 DIAGNOSIS — Z51.5 PALLIATIVE CARE PATIENT: Chronic | ICD-10-CM

## 2023-11-01 DIAGNOSIS — Z95.2 S/P AVR: ICD-10-CM

## 2023-11-01 DIAGNOSIS — I25.10 CORONARY ARTERY DISEASE INVOLVING NATIVE HEART, UNSPECIFIED VESSEL OR LESION TYPE, UNSPECIFIED WHETHER ANGINA PRESENT: ICD-10-CM

## 2023-11-01 DIAGNOSIS — D64.9 ANEMIA, UNSPECIFIED TYPE: ICD-10-CM

## 2023-11-01 DIAGNOSIS — E66.01 CLASS 2 SEVERE OBESITY DUE TO EXCESS CALORIES WITH SERIOUS COMORBIDITY AND BODY MASS INDEX (BMI) OF 36.0 TO 36.9 IN ADULT: ICD-10-CM

## 2023-11-01 DIAGNOSIS — Z00.00 HEALTH MAINTENANCE EXAMINATION: ICD-10-CM

## 2023-11-01 DIAGNOSIS — I50.42 CHRONIC COMBINED SYSTOLIC AND DIASTOLIC CHF (CONGESTIVE HEART FAILURE) (HCC): Primary | ICD-10-CM

## 2023-11-01 DIAGNOSIS — G89.3 CANCER-RELATED PAIN: Chronic | ICD-10-CM

## 2023-11-01 PROBLEM — K59.1 FUNCTIONAL DIARRHEA: Status: RESOLVED | Noted: 2022-11-29 | Resolved: 2023-11-01

## 2023-11-01 PROBLEM — R63.4 WEIGHT LOSS: Status: RESOLVED | Noted: 2022-02-28 | Resolved: 2023-11-01

## 2023-11-01 PROBLEM — G89.29 CHRONIC MIDLINE LOW BACK PAIN: Status: RESOLVED | Noted: 2021-07-01 | Resolved: 2023-11-01

## 2023-11-01 PROBLEM — E87.6 HYPOKALEMIA: Status: RESOLVED | Noted: 2023-04-04 | Resolved: 2023-11-01

## 2023-11-01 PROBLEM — E87.8 ELECTROLYTE ABNORMALITY: Status: RESOLVED | Noted: 2023-04-07 | Resolved: 2023-11-01

## 2023-11-01 PROBLEM — M54.50 CHRONIC MIDLINE LOW BACK PAIN: Status: RESOLVED | Noted: 2021-07-01 | Resolved: 2023-11-01

## 2023-11-01 PROBLEM — M79.606 LOWER EXTREMITY PAIN: Status: RESOLVED | Noted: 2021-07-11 | Resolved: 2023-11-01

## 2023-11-01 PROBLEM — L03.031 PARONYCHIA OF GREAT TOE OF RIGHT FOOT: Status: RESOLVED | Noted: 2023-04-03 | Resolved: 2023-11-01

## 2023-11-01 PROBLEM — D75.839 THROMBOCYTOSIS: Status: RESOLVED | Noted: 2023-04-13 | Resolved: 2023-11-01

## 2023-11-01 PROBLEM — I95.9 HYPOTENSION: Status: RESOLVED | Noted: 2021-07-10 | Resolved: 2023-11-01

## 2023-11-01 PROBLEM — R79.89 TROPONIN LEVEL ELEVATED: Status: RESOLVED | Noted: 2023-04-04 | Resolved: 2023-11-01

## 2023-11-01 PROBLEM — L08.9 DIABETIC FOOT INFECTION: Status: RESOLVED | Noted: 2023-04-26 | Resolved: 2023-11-01

## 2023-11-01 PROBLEM — E11.628 DIABETIC FOOT INFECTION: Status: RESOLVED | Noted: 2023-04-26 | Resolved: 2023-11-01

## 2023-11-01 PROBLEM — E66.812 CLASS 2 SEVERE OBESITY DUE TO EXCESS CALORIES WITH SERIOUS COMORBIDITY AND BODY MASS INDEX (BMI) OF 36.0 TO 36.9 IN ADULT (HCC): Status: ACTIVE | Noted: 2023-11-01

## 2023-11-01 PROBLEM — R53.1 GENERALIZED WEAKNESS: Status: RESOLVED | Noted: 2023-04-04 | Resolved: 2023-11-01

## 2023-11-01 PROBLEM — R33.9 URINARY RETENTION: Status: RESOLVED | Noted: 2021-07-11 | Resolved: 2023-11-01

## 2023-11-01 PROBLEM — R29.898 MUSCULAR DECONDITIONING: Status: RESOLVED | Noted: 2022-02-28 | Resolved: 2023-11-01

## 2023-11-01 PROCEDURE — 90662 IIV NO PRSV INCREASED AG IM: CPT | Performed by: INTERNAL MEDICINE

## 2023-11-01 PROCEDURE — 99214 OFFICE O/P EST MOD 30 MIN: CPT | Performed by: INTERNAL MEDICINE

## 2023-11-01 PROCEDURE — G0439 PPPS, SUBSEQ VISIT: HCPCS | Performed by: INTERNAL MEDICINE

## 2023-11-01 PROCEDURE — G0008 ADMIN INFLUENZA VIRUS VAC: HCPCS | Performed by: INTERNAL MEDICINE

## 2023-11-01 NOTE — PROGRESS NOTES
Assessment/Plan:    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)  Taking prednisone bid. Ongoing chemotherapy. Follow up with oncology. Sees palliative monthly. CAD (coronary artery disease)  On daily ASA and Plavix, atorvastatin. Cancer-related pain  On oxycodone 5 mg q6. Per palliative. PAD (peripheral artery disease) (HCC)  On ASA, Plavix, statin. Encouraged to walk around his home daily. Attempted to explain leg pain may improve with daily walks. On oxycodone, may continue gabapentin. Anemia  Recent Hgb low again. Will monitor. Ambulatory dysfunction  No falls. Using cane. Chronic combined systolic and diastolic CHF (congestive heart failure) ([facility])  Wt Readings from Last 3 Encounters:   11/01/23 105 kg (232 lb)   10/26/23 103 kg (227 lb 15.3 oz)   10/11/23 100 kg (220 lb 6.4 oz)     On daily furosemide 40 mg with K. On metoprolol and low dose losartan. Also on Jardiance. S/P AVR  Reviewed recent echo. On ASA, Plavix. Palliative care patient  He rarely sees his son. He is on his own most of the time, has Meals on Wheels (2 meals). Instructed to match the list of medications with his pills at home.      Diagnoses and all orders for this visit:    Chronic combined systolic and diastolic CHF (congestive heart failure) ([address])  -     Lipid panel    Malignant neoplasm metastatic to bone [facility])    PAD (peripheral artery disease) ([facility])    Palliative care patient    S/P AVR    Coronary artery disease involving native heart, unspecified vessel or lesion type, unspecified whether angina present  -     Lipid panel    Cancer-related pain    Encounter for long-term current use of medication  -     Vitamin B12    Class 2 severe obesity due to excess calories with serious comorbidity and body mass index (BMI) of 36.0 to 36.9 in adult     Encounter for immunization  -     influenza vaccine, high-dose, PF 0.7 mL (FLUZONE HIGH-DOSE)    Ambulatory dysfunction    Anemia, unspecified type    Health maintenance examination  Comments:  Flu vaccine today. Follow up in 4 months or as needed. Subjective:      Patient ID: Vero Mac is a 68 y.o. male here to establish care. He reports feeling alright. He mainly complains of pain in his right leg and foot area. He reports the tingling and the pain never went away even after the wound healed. He takes oxycodone, thinks he is on gabapentin but feels it is not really helping. The other medication that he knows is prednisone which he takes twice a day. He has scheduled infusion tomorrow. He reports experiencing chest pains occasionally, this would only occur at rest.  Denies any symptoms with activity, ambulation. No palpitations, syncope, headaches or dizziness. He receives 2 meals from [employer] on Whe[employer] daily. He reports receiving fruits and vegetables on a daily basis. He is very sedentary at home. His son lives in the attic whom he rarely sees. He rents out the basement apartment. The following portions of the patient's history were reviewed and updated as appropriate: allergies, current medications, past medical history, past social history, and problem list.    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   • Coronary artery disease 2001    S/p stenting to circumflex and RCA   • HFrEF (heart failure with reduced ejection fraction) ([address] [facility]) 06/14/2021   • High cholesterol    • Prostate cancer [facility])      Past Surgical History:   Procedure Laterality Date   • CARDIAC ELECTROPHYSIOLOGY PROCEDURE N/A 12/23/2021    Procedure: Implant ICD - bi ventricular;  Surgeon: Malou Tay MD;  Location: [facility] CARDIAC CATH LAB; Service: Cardiology   • CARDIAC SURGERY     • IR LOWER EXTREMITY ANGIOGRAM  4/18/2023   • NV TEAEC W/WO PATCH GRAFT COMMON FEMORAL Right 7/9/2021    Procedure: ENDARTERECTOMY ARTERIAL FEMORAL;  Surgeon: Kev Rodriguez DO;  Location: [facility];  Service: Vascular     History reviewed.  No pertinent family history. Social History     Socioeconomic History   • Marital status:      Spouse name: Not on file   • Number of children: 3   • Years of education: Not on file   • Highest education level: Not on file   Occupational History   • Not on file   Tobacco Use   • Smoking status: Former     Years: 20.00     Types: Cigarettes     Start date: 1962     Quit date: 2002     Years since quittin.3     Passive exposure: Past   • Smokeless tobacco: Never   Vaping Use   • Vaping Use: Never used   Substance and Sexual Activity   • Alcohol use: Not Currently   • Drug use: Not Currently   • Sexual activity: Not on file   Other Topics Concern   • Not on file   Social History Narrative    Single    Son lives in the attic of his home, minimal contact     Social Determinants of Health     Financial Resource Strain: Low Risk  (2023)    Overall Financial Resource Strain (CARDIA)    • Difficulty of Paying Living Expenses: Not hard at all   Food Insecurity: No Food Insecurity (2023)    Hunger Vital Sign    • Worried About Running Out of Food in the Last Year: Never true    • Ran Out of Food in the Last Year: Never true   Transportation Needs: No Transportation Needs (2023)    PRAPARE - Transportation    • Lack of Transportation (Medical): No    • Lack of Transportation (Non-Medical):  No   Physical Activity: Not on file   Stress: Not on file   Social Connections: Not on file   Intimate Partner Violence: Not on file   Housing Stability: Unknown (2023)    Housing Stability Vital Sign    • Unable to Pay for Housing in the Last Year: No    • Number of Places Lived in the Last Year: Not on file    • Unstable Housing in the Last Year: No       Current Outpatient Medications:   •  abiraterone (ZYTIGA) 250 mg tablet, Take 4 tablets (1,000 mg total) by mouth once daily, Disp: 120 tablet, Rfl: 10  •  acetaminophen (TYLENOL) 500 mg tablet, Take 2 tablets (1,000 mg total) by mouth 3 (three) times a day as needed for mild pain, Disp: , Rfl:   •  aspirin 81 mg chewable tablet, Chew 1 tablet (81 mg total) daily, Disp: 90 tablet, Rfl: 0  •  atorvastatin (LIPITOR) 80 mg tablet, Take 1 tablet (80 mg total) by mouth daily with dinner, Disp: 90 tablet, Rfl: 1  •  Blood Glucose Monitoring Suppl (Accu-Chek Guide Me) w/Device KIT, USE AS DIRECTED, Disp: 1 kit, Rfl: 0  •  cetirizine (ZyrTEC) 5 MG tablet, Take 1 tablet (5 mg total) by mouth daily, Disp: 90 tablet, Rfl: 3  •  clopidogrel (PLAVIX) 75 mg tablet, Take 1 tablet (75 mg total) by mouth daily, Disp: 90 tablet, Rfl: 0  •  Diclofenac Sodium (VOLTAREN) 1 %, APPLY 2 G TOPICALLY 4 (FOUR) TIMES A DAY FOR PAIN TO AFFECTED AREA, Disp: 100 g, Rfl: 0  •  Empagliflozin (JARDIANCE) 10 MG TABS tablet, Take 1 tablet (10 mg total) by mouth every morning, Disp: 30 tablet, Rfl: 11  •  famotidine (PEPCID) 20 mg tablet, Take 1 tablet (20 mg total) by mouth daily, Disp: 30 tablet, Rfl: 2  •  ferrous sulfate 325 (65 Fe) mg tablet, Take 1 tablet (325 mg total) by mouth 2 (two) times a day with meals, Disp: 180 tablet, Rfl: 0  •  furosemide (LASIX) 20 mg tablet, Take 2 tablets (40 mg total) by mouth daily, Disp: 60 tablet, Rfl: 0  •  gabapentin (NEURONTIN) 300 mg capsule, TAKE 2 CAPSULES (600 MG TOTAL) BY MOUTH 2 (TWO) TIMES A DAY TO REDUCE NERVE PAIN IN FEET., Disp: 120 capsule, Rfl: 0  •  Lancet Devices (Lancing Device) MISC, TID, Disp: 100 each, Rfl: 2  •  loperamide (IMODIUM) 2 mg capsule, Take 1 capsule (2 mg total) by mouth 4 (four) times a day as needed for diarrhea, Disp: 30 capsule, Rfl: 2  •  losartan (COZAAR) 25 mg tablet, TAKE 0.5 TABLETS (12.5 MG TOTAL) BY MOUTH DAILY, Disp: 45 tablet, Rfl: 1  •  methocarbamol (ROBAXIN) 500 mg tablet, Take 1 tablet (500 mg total) by mouth 3 (three) times a day, Disp: 90 tablet, Rfl: 0  •  metoprolol succinate (TOPROL-XL) 25 mg 24 hr tablet, TAKE HALF TABLET DAILY, Disp: 45 tablet, Rfl: 2  •  oxyCODONE (ROXICODONE) 5 immediate release tablet, Take 1 tablet (5 mg total) by mouth every 6 (six) hours as needed (cancer pain) Max Daily Amount: 20 mg, Disp: 120 tablet, Rfl: 0  •  potassium chloride (K-DUR,KLOR-CON) 20 mEq tablet, TAKE 1 TABLET (20 MEQ TOTAL) BY MOUTH DAILY, Disp: 30 tablet, Rfl: 2  •  predniSONE 5 mg tablet, Take 1 tablet (5 mg total) by mouth 2 (two) times a day with meals, Disp: 60 tablet, Rfl: 5  •  capsicum (ZOSTRIX) 0.075 % topical cream, Apply topically 3 (three) times a day for 7 days Apply on the feet and avoid areas of wounds or scabs, Disp: 60 g, Rfl: 0  No Known Allergies      Review of Systems   Constitutional:  Negative for activity change, appetite change and fatigue. HENT:  Positive for hearing loss. Negative for congestion. Eyes:  Positive for visual disturbance. Respiratory:  Negative for cough, chest tightness and shortness of breath. Cardiovascular:  Negative for chest pain, palpitations and leg swelling. Gastrointestinal:  Positive for constipation. Negative for abdominal pain. Genitourinary:  Negative for dysuria and frequency. Musculoskeletal:  Positive for arthralgias and gait problem. Skin:  Negative for rash and wound. Neurological:  Negative for dizziness, numbness and headaches. Hematological:  Does not bruise/bleed easily. Psychiatric/Behavioral:  Negative for sleep disturbance. The patient is not nervous/anxious. Objective:      /88   Pulse 72   Temp 98.1 °F (36.7 °C)   Ht 5' 7" (1.702 m)   Wt 105 kg (232 lb)   SpO2 96%   BMI 36.34 kg/m²          Physical Exam  Vitals and nursing note reviewed. Constitutional:       General: He is not in acute distress. Appearance: He is well-developed. HENT:      Head: Normocephalic and atraumatic. Right Ear: External ear normal.      Left Ear: External ear normal.      Nose: Nose normal.      Mouth/Throat:      Mouth: Mucous membranes are moist.      Pharynx: Uvula midline.    Eyes:      Conjunctiva/sclera: Conjunctivae normal.      Pupils: Pupils are equal, round, and reactive to light. Neck:      Thyroid: No thyroid mass. Cardiovascular:      Rate and Rhythm: Normal rate and regular rhythm. Heart sounds: Normal heart sounds. Pulmonary:      Effort: Pulmonary effort is normal.      Breath sounds: Normal breath sounds. No wheezing or rhonchi. Abdominal:      General: Bowel sounds are normal.      Palpations: Abdomen is soft. Tenderness: There is no abdominal tenderness. Hernia: No hernia is present. Musculoskeletal:         General: No swelling. Normal range of motion. Cervical back: Neck supple. Right lower leg: No edema. Left lower leg: No edema. Comments: Using cane   Lymphadenopathy:      Cervical: No cervical adenopathy. Upper Body:      Right upper body: No supraclavicular adenopathy. Left upper body: No supraclavicular adenopathy. Skin:     General: Skin is warm. Findings: No rash. Neurological:      General: No focal deficit present. Mental Status: He is alert and oriented to person, place, and time. Cranial Nerves: No cranial nerve deficit. Psychiatric:         Mood and Affect: Mood and affect normal.         Behavior: Behavior normal.           Reviewed available records. Labs & imaging results reviewed with patient.

## 2023-11-01 NOTE — ASSESSMENT & PLAN NOTE
Wt Readings from Last 3 Encounters:   11/01/23 105 kg (232 lb)   10/26/23 103 kg (227 lb 15.3 oz)   10/11/23 100 kg (220 lb 6.4 oz)     On daily furosemide 40 mg with K. On metoprolol and low dose losartan. Also on Jardiance.

## 2023-11-01 NOTE — ASSESSMENT & PLAN NOTE
He rarely sees his son. He is on his own most of the time, has Meals on Wheels (2 meals). Instructed to match the list of medications with his pills at home.

## 2023-11-01 NOTE — ASSESSMENT & PLAN NOTE
Wt Readings from Last 3 Encounters:   10/26/23 103 kg (227 lb 15.3 oz)   10/11/23 100 kg (220 lb 6.4 oz)   09/26/23 97.3 kg (214 lb 8.1 oz)

## 2023-11-01 NOTE — PROGRESS NOTES
Assessment and Plan:     Problem List Items Addressed This Visit        Cardiovascular and Mediastinum    CAD (coronary artery disease)     On daily ASA and Plavix, atorvastatin. Relevant Orders    Lipid panel    Chronic combined systolic and diastolic CHF (congestive heart failure) ([address]) - Primary     Wt Readings from Last 3 Encounters:   11/01/23 105 kg (232 lb)   10/26/23 103 kg (227 lb 15.3 oz)   10/11/23 100 kg (220 lb 6.4 oz)     On daily furosemide 40 mg with K. On metoprolol and low dose losartan. Also on Jardiance. Relevant Orders    Lipid panel    PAD (peripheral artery disease) (HCC)     On ASA, Plavix, statin. Encouraged to walk around his home daily. Attempted to explain leg pain may improve with daily walks. On oxycodone, may continue gabapentin. Musculoskeletal and Integument    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)     Taking prednisone bid. Ongoing chemotherapy. Follow up with oncology. Sees palliative monthly. Other    Ambulatory dysfunction (Chronic)     No falls. Using cane. Cancer-related pain (Chronic)     On oxycodone 5 mg q6. Per palliative. Palliative care patient (Chronic)     He rarely sees his son. He is on his own most of the time, has Meals on Wheels (2 meals). Instructed to match the list of medications with his pills at home. Anemia     Recent Hgb low again. Will monitor. Class 2 severe obesity due to excess calories with serious comorbidity and body mass index (BMI) of 36.0 to 36.9 in adult     S/P AVR     Reviewed recent echo. On ASA, Plavix. Other Visit Diagnoses     Encounter for long-term current use of medication        Relevant Orders    Vitamin B12    Encounter for immunization        Relevant Orders    influenza vaccine, high-dose, PF 0.7 mL (FLUZONE HIGH-DOSE) (Completed)    Health maintenance examination        Flu vaccine today.            Preventive health issues were discussed with patient, and age appropriate screening tests were ordered as noted in patient's After Visit Summary. Personalized health advice and appropriate referrals for health education or preventive services given if needed, as noted in patient's After Visit Summary.      History of Present Illness:     Patient presents for a Medicare Wellness Visit    HPI   Patient Care Team:  Robbie Metzger MD as PCP - General (Internal Medicine)  Roldan Ward MD as Cardiologist (Cardiology)  Leanne Ellison DO (Vascular Surgery)  YUNIOR Vallejo as  Care Manager (Oncology)     Review of Systems:     Review of Systems     Problem List:     Patient Active Problem List   Diagnosis   • Chronic combined systolic and diastolic CHF (congestive heart failure) (HCC)   • S/P AVR   • Anemia   • Ischemic cardiomyopathy   • Ischemic leg pain   • Prostate cancer ([address])   • CAD (coronary artery disease)   • PAD (peripheral artery disease) ([address])   • Port-A-Cath in place   • Malignant neoplasm metastatic to bone [facility])   • Embolism and 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 ([address])   • Depression, recurrent ([address])   • Cancer-related pain   • Palliative care patient   • Ambulatory dysfunction   • Peripheral neuropathy   • Financial problems   • Moderate vascular dementia ([address])   • Class 2 severe obesity due to excess calories with serious comorbidity and body mass index (BMI) of 36.0 to 36.9 in adult       Past Medical and Surgical History:     Past Medical History:   Diagnosis Date   • Cancer ([address]) 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   • Coronary artery disease 2001    S/p stenting to circumflex and RCA   • HFrEF (heart failure with reduced ejection fraction) ([address]) 06/14/2021   • High cholesterol    • Prostate cancer [facility])      Past Surgical History:   Procedure Laterality Date   • CARDIAC ELECTROPHYSIOLOGY PROCEDURE N/A 12/23/2021    Procedure: Implant ICD - bi ventricular;  Surgeon: Bayard Collet, MD;  Location: [facility]; Service: Cardiology   • CARDIAC SURGERY     • IR LOWER EXTREMITY ANGIOGRAM  2023   • DC TEAEC W/WO PATCH GRAFT COMMON FEMORAL Right 2021    Procedure: ENDARTERECTOMY ARTERIAL FEMORAL;  Surgeon: Lorie Ley DO;  Location: [facility];  Service: Vascular      Family History:     History reviewed. No pertinent family history. Social History:     Social History     Socioeconomic History   • Marital status:      Spouse name: None   • Number of children: 3   • Years of education: None   • Highest education level: None   Occupational History   • None   Tobacco Use   • Smoking status: Former     Years: 20.00     Types: Cigarettes     Start date: 1962     Quit date: 2002     Years since quittin.3     Passive exposure: Past   • Smokeless tobacco: Never   Vaping Use   • Vaping Use: Never used   Substance and Sexual Activity   • Alcohol use: Not Currently   • Drug use: Not Currently   • Sexual activity: None   Other Topics Concern   • None   Social History Narrative    Single    Son lives in the attic of his home, minimal contact     Social Determinants of Health     Financial Resource Strain: Low Risk  (2023)    Overall Financial Resource Strain (CARDIA)    • Difficulty of Paying Living Expenses: Not hard at all   Food Insecurity: No Food Insecurity (2023)    Hunger Vital Sign    • Worried About Running Out of Food in the Last Year: Never true    • Ran Out of Food in the Last Year: Never true   Transportation Needs: No Transportation Needs (2023)    PRAPARE - Transportation    • Lack of Transportation (Medical): No    • Lack of Transportation (Non-Medical):  No   Physical Activity: Not on file   Stress: Not on file   Social Connections: Not on file   Intimate Partner Violence: Not on file   Housing Stability: Unknown (2023)    Housing Stability Vital Sign    • Unable to Pay for Housing in the Last Year: No    • Number of Places Lived in the Last Year: Not on file    • Unstable Housing in the Last Year: No      Medications and Allergies:     Current Outpatient Medications   Medication Sig Dispense Refill   • abiraterone (ZYTIGA) 250 mg tablet Take 4 tablets (1,000 mg total) by mouth once daily 120 tablet 10   • acetaminophen (TYLENOL) 500 mg tablet Take 2 tablets (1,000 mg total) by mouth 3 (three) times a day as needed for mild pain     • aspirin 81 mg chewable tablet Chew 1 tablet (81 mg total) daily 90 tablet 0   • atorvastatin (LIPITOR) 80 mg tablet Take 1 tablet (80 mg total) by mouth daily with dinner 90 tablet 1   • Blood Glucose Monitoring Suppl (Accu-Chek Guide Me) w/Device KIT USE AS DIRECTED 1 kit 0   • cetirizine (ZyrTEC) 5 MG tablet Take 1 tablet (5 mg total) by mouth daily 90 tablet 3   • clopidogrel (PLAVIX) 75 mg tablet Take 1 tablet (75 mg total) by mouth daily 90 tablet 0   • Diclofenac Sodium (VOLTAREN) 1 % APPLY 2 G TOPICALLY 4 (FOUR) TIMES A DAY FOR PAIN TO AFFECTED AREA 100 g 0   • Empagliflozin (JARDIANCE) 10 MG TABS tablet Take 1 tablet (10 mg total) by mouth every morning 30 tablet 11   • famotidine (PEPCID) 20 mg tablet Take 1 tablet (20 mg total) by mouth daily 30 tablet 2   • ferrous sulfate 325 (65 Fe) mg tablet Take 1 tablet (325 mg total) by mouth 2 (two) times a day with meals 180 tablet 0   • furosemide (LASIX) 20 mg tablet Take 2 tablets (40 mg total) by mouth daily 60 tablet 0   • gabapentin (NEURONTIN) 300 mg capsule TAKE 2 CAPSULES (600 MG TOTAL) BY MOUTH 2 (TWO) TIMES A DAY TO REDUCE NERVE PAIN IN FEET.  120 capsule 0   • Lancet Devices (Lancing Device) MISC  each 2   • loperamide (IMODIUM) 2 mg capsule Take 1 capsule (2 mg total) by mouth 4 (four) times a day as needed for diarrhea 30 capsule 2   • losartan (COZAAR) 25 mg tablet TAKE 0.5 TABLETS (12.5 MG TOTAL) BY MOUTH DAILY 45 tablet 1   • methocarbamol (ROBAXIN) 500 mg tablet Take 1 tablet (500 mg total) by mouth 3 (three) times a day 90 tablet 0   • metoprolol succinate (TOPROL-XL) 25 mg 24 hr tablet TAKE HALF TABLET DAILY 45 tablet 2   • oxyCODONE (ROXICODONE) 5 immediate release tablet Take 1 tablet (5 mg total) by mouth every 6 (six) hours as needed (cancer pain) Max Daily Amount: 20 mg 120 tablet 0   • potassium chloride (K-DUR,KLOR-CON) 20 mEq tablet TAKE 1 TABLET (20 MEQ TOTAL) BY MOUTH DAILY 30 tablet 2   • predniSONE 5 mg tablet Take 1 tablet (5 mg total) by mouth 2 (two) times a day with meals 60 tablet 5   • capsicum (ZOSTRIX) 0.075 % topical cream Apply topically 3 (three) times a day for 7 days Apply on the feet and avoid areas of wounds or scabs 60 g 0     No current facility-administered medications for this visit. No Known Allergies   Immunizations:     Immunization History   Administered Date(s) Administered   • COVID-19 MODERNA VACC 0.5 ML IM 01/11/2021, 02/08/2021, 09/08/2021   • H1N1, All Formulations 03/25/2010   • INFLUENZA 11/18/2002, 12/16/2003, 12/02/2004, 12/20/2005, 10/01/2006, 10/01/2007, 12/12/2007, 09/25/2009, 01/06/2011, 02/02/2012, 12/14/2012   • Influenza, high dose seasonal 0.7 mL 02/28/2022, 11/29/2022, 11/01/2023   • Influenza, seasonal, injectable 10/09/2015, 03/27/2017, 09/27/2017, 10/24/2018, 01/13/2020   • Pneumococcal 07/13/2004, 12/14/2012   • Pneumococcal Conjugate 13-Valent 10/09/2015   • Td (adult), Unspecified 12/16/2003      Health Maintenance:         Topic Date Due   • Hepatitis C Screening  Never done         Topic Date Due   • Pneumococcal Vaccine: 65+ Years (2 - PPSV23 if available, else PCV20) 12/04/2015   • COVID-19 Vaccine (4 - Booster for Moderna series) 11/03/2021      Medicare Screening Tests and Risk Assessments:     Skyla Branch is here for his Subsequent Wellness visit. Last Medicare Wellness visit information reviewed, patient interviewed and updates made to the record today. Health Risk Assessment:   Patient rates overall health as fair. Patient feels that their physical health rating is slightly worse.  Patient is satisfied with their life. Eyesight was rated as slightly worse. Hearing was rated as same. Patient feels that their emotional and mental health rating is same. Patients states they are never, rarely angry. Patient states they are sometimes unusually tired/fatigued. Pain experienced in the last 7 days has been none. Patient states that he has experienced no weight loss or gain in last 6 months. Fall Risk Screening: In the past year, patient has experienced: no history of falling in past year      Home Safety:  Patient does not have trouble with stairs inside or outside of their home. Patient has working smoke alarms and has working carbon monoxide detector. Home safety hazards include: none. Nutrition:   Current diet is Regular. Medications:   Patient is currently taking over-the-counter supplements. OTC medications include: see medication list. Patient is able to manage medications. Activities of Daily Living (ADLs)/Instrumental Activities of Daily Living (IADLs):   Walk and transfer into and out of bed and chair?: Yes  Dress and groom yourself?: Yes    Bathe or shower yourself?: Yes    Feed yourself?  Yes  Do your laundry/housekeeping?: Yes  Manage your money, pay your bills and track your expenses?: Yes  Make your own meals?: Yes    Do your own shopping?: Yes    Previous Hospitalizations:   Any hospitalizations or ED visits within the last 12 months?: No      Advance Care Planning:   Living will: No    Durable POA for healthcare: No    End of Life Decisions reviewed with patient: No      Cognitive Screening:   Provider or family/friend/caregiver concerned regarding cognition?: No    PREVENTIVE SCREENINGS      Cardiovascular Screening:    General: Screening Current      Diabetes Screening:     General: History Diabetes and Screening Current      Colorectal Cancer Screening:     General: Patient Declines      Prostate Cancer Screening:    General: History Prostate Cancer and Screening Current Osteoporosis Screening:    General: Screening Not Indicated      Abdominal Aortic Aneurysm (AAA) Screening:    Risk factors include: tobacco use        General: Screening Not Indicated      Lung Cancer Screening:     General: Screening Not Indicated      Hepatitis C Screening:    General: Screening Not Indicated    Screening, Brief Intervention, and Referral to Treatment (SBIRT)    Screening  Typical number of drinks in a day: 0  Typical number of drinks in a week: 0  Interpretation: Low risk drinking behavior. Single Item Drug Screening:  How often have you used an illegal drug (including marijuana) or a prescription medication for non-medical reasons in the past year? never    Single Item Drug Screen Score: 0  Interpretation: Negative screen for possible drug use disorder    Review of Current Opioid Use    Opioid Risk Tool (ORT) Interpretation: Complete Opioid Risk Tool (ORT)    Other Counseling Topics:   Regular weightbearing exercise. No results found.      Physical Exam:     /88   Pulse 72   Temp 98.1 °F (36.7 °C)   Ht 5' 7" (1.702 m)   Wt 105 kg (232 lb)   SpO2 96%   BMI 36.34 kg/m²     Physical Exam     Jamarcus Farnsworth MD    See other note

## 2023-11-01 NOTE — ASSESSMENT & PLAN NOTE
On ASA, Plavix, statin. Encouraged to walk around his home daily. Attempted to explain leg pain may improve with daily walks. On oxycodone, may continue gabapentin.

## 2023-11-02 ENCOUNTER — HOSPITAL ENCOUNTER (OUTPATIENT)
Dept: NUCLEAR MEDICINE | Facility: HOSPITAL | Age: 78
End: 2023-11-02
Payer: COMMERCIAL

## 2023-11-02 DIAGNOSIS — C61 MALIGNANT NEOPLASM OF PROSTATE (HCC): ICD-10-CM

## 2023-11-02 PROCEDURE — A9607 HB LUTETIUM LU 177 VIPIVOTIDE TETRAXETAN, THERAPEUTIC, 1 MILLICURIE: HCPCS

## 2023-11-02 PROCEDURE — 79101 NUCLEAR RX IV ADMIN: CPT

## 2023-11-02 RX ORDER — BLOOD-GLUCOSE METER
EACH MISCELLANEOUS
Qty: 1 KIT | Refills: 1 | Status: SHIPPED | OUTPATIENT
Start: 2023-11-02

## 2023-11-07 DIAGNOSIS — I73.9 PAD (PERIPHERAL ARTERY DISEASE) (HCC): ICD-10-CM

## 2023-11-07 RX ORDER — GABAPENTIN 300 MG/1
CAPSULE ORAL
Qty: 120 CAPSULE | Refills: 0 | Status: SHIPPED | OUTPATIENT
Start: 2023-11-07

## 2023-11-16 NOTE — PROGRESS NOTES
Advanced Heart Failure Outpatient Progress Note - Luke Rene 66 y.o. male MRN: [id number]    Encounter: [id number]      Assessment/Plan:    Patient Active Problem List    Diagnosis Date Noted    PAD (peripheral artery disease) ([address]) 07/19/2021    CAD (coronary artery disease) 07/10/2021    Ischemic leg pain 07/07/2021    Prostate cancer ([address]) 07/07/2021    Ischemic cardiomyopathy 07/01/2021    Chronic combined systolic and diastolic CHF (congestive heart failure) ([address]) 06/22/2021    S/P AVR 06/22/2021    Anemia 06/22/2021    Class 2 severe obesity due to excess calories with serious comorbidity and body mass index (BMI) of 36.0 to 36.9 in adult  11/01/2023    Moderate vascular dementia ([address]) 03/30/2023    Ambulatory dysfunction 08/08/2022    Peripheral neuropathy 08/08/2022    Financial problems 08/08/2022    Cancer-related pain 05/10/2022    Palliative care patient 05/10/2022    Malignant neoplasm metastatic to bone ([address]) 02/28/2022    Embolism and 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 ([address]) 02/28/2022    Depression, recurrent ([address]) 02/28/2022    Port-A-Cath in place 10/05/2021       # Chronic HFrEF, NYHA III; ACC/AHA Stage C  Etiology: ischemic +/- progression of valvular disease +/- chemotherapy (on abiraterone and leuprolide with unclear safety profile). Euvolemic, warm and and well perfused on exam    Diagnostic:    TTE 06/14/2021 (at [facility] pre- and post-TAVR, per report): LVEF 20%. LVIDd 5.4 cm. "entire apex, mid and apical anterior septum, mid and apical inferior septum, mid and apical inferior wall, mid inferolateral wall, and mid lateral wall are akinetic." Trace MR and TR. Pre-TAVR: LVOT VTI 12.0 cm. JOSE 0.47 cm^2. Post: bioprosthetic AV valve present; JOSE 2.41 cm^2. TTE 06/23/2021:   LVEF 20%. LVIDd 5.9 cm. Grade 2 DD.  aneurysmal LV mid to apex."dyskinesis and aneurysmal deformity of the apical anterior, mid anteroseptal, apical inferior, apical septal, and apical wall(s)."   Normal RV size and RVSF. Mild MR. AV bioprosthesis with normal leaflet separation. Trace TR. TTE 11/17/21:  LVEF: 25%  LVIDd: 6.1cm  RV: normal size and systolic function  MR: mild to moderate   Other: Akinesis and aneurysmal deformity of the mid septum and apex. Grade 2 diastology  Ascending aorta 4cm  TAVR bioprosthetic valve, normally functioning. Mean gradient 3mmHg     TTE 12/15/22:  LVEF 30-35%. LVIDD , moderate concentric hypertrophy, regional wall motion abnormalities  Grade 1 diastology  Well seated TAVR valve, mild paravalvular AI, mean gradient 7  Estimated RVSP 31mmHg  Normal RV systolic function    TTE 7/8/78:  LVEF 30-35%  LVIDD 4.6cm, moderate concentric LVH, RWMA, grade 1 diastology  Well seated TAVR valve. Mean gradient 9mmHg, mild paravalvular AI  Trace MR  Normal RV size and systolic function  Estimated RVSP 20mmHg    Neurohormonal Blockade:  --Beta-Blocker: Metoprolol succinate 25 mg daily - decreased to 12.5mg daily  --ACEi, ARB or ARNi: Entresto 24/26 mg BID - switched to losartan 25mg daily due to low blood pressure - decreased to 12.5mg daily due to lightheadedness    (or SVR reduction)  --Aldosterone Receptor Blocker: Spironolactone 12.5 mg daily - off due to low BP  --SGLT2-I: Jardiance 10mg daily  --Diuretic: Lasix 20 mg daily     Sudden Cardiac Death Risk Reduction:  --ICD: s/p SJM BIV ICD 12/23/21  Interrogation 5/15/23: AP 56%. BVP 98%. AT/AF burden <1%. CoreVue normal   Interrogation 1/12/23: AP 58% BVP 98%. 2VT-NS episodes. AT/AF burden <1%. CoreVue normal     Electrical Resynchronization:  -- LBBB QRSd 158 msec preBIV; QRSd 148msec post BIV     Advanced Therapies (If appropriate): --We will continue to monitor     # Ventricular tachycardia  Detected on device interrogation 4/29/2022 status post ATP. No recurrence. Maintained on beta blocker. # Prostate cancer with bony metastases  Receiving chemotherapy through the [place].   Taking SQ leuprolide (Eligard) and abiraterone (Zytiga) - safety of abiraterone in patients with LVEF <50% not established per FDA. [place]-analysis of drug noted increased incidence and relative risk of CV toxicity. # Coronary artery disease  Without active chest pain. S/pstenting to circumflex and RCA in 2001. LHC 06/09/2021 (per documentation):"diffuse moderate prox and mid disease/ISR, severe OM lesion and total mid LAD."  Rx: aspirin, plavix, statin     # Aortic stenosis  S/p TAVR (Evolut Pro+ 29mm) on 06/14/2021 at [facility]. Normally functioning on last echo 8/2023, mild paravalvular AI    # RLE ischemia. LEAD with >75% stenosis of the CFA/SFA. s/p right femoral endarterectomy 7/9/21  Continues on ASA, Plavix, and statin. Follows with Dr. Eloy Corley      # Hyperlipidemia. Rx: atorvastatin 80mg daily  # History of atrial tachycardia: s/p ablation at OSH in 2018. # Benign prostatic hyperplasia      TODAY'S PLAN:  Euvolemic, stable from cardiac standpoint  Continue current medications  2g sodium diet   2L fluid restriction  Daily weights  Physical activities/exercise as tolerated      HPI:   Mary Corrigan is a 60-year-old man with a PMH as aboe who presents for hospital follow-up. Presented to [facility] on 06/03/2021 with chest pressure before chemotherapy treatment for prostate cancer. Sent for testing and noted to be in acute CHF exacerbation and was diuresed. Was then transferred to [facility] in [facility]. TTE showed "severely reduced LVEF with LV apical aneurysm and septal/AW AK, RV normal, Vmax at 3.5 (however, it was read as moderate AS and preserved EF in 2019 so likely this is progression of AS in addition to interim AWMI)."  [facility] also done which revealed "diffuse moderate prox and mid disease/ISR, severe OM lesion and total mid LAD."     Transferred to [facility] on 06/13 for TAVR. TAVR completed on 06/14, and patient was discharged home on 06/15/2021.  Started on metoprolol succinate this admission (tartrate discontinued). Admitted to [facility] from 06/22 to 06/25/2021 after presenting with worsening SOB and LE edema for 3 days. WAS without discharge medications from [facility] admission for about 1 week (including Lasix). NT-proBNP 5157. Cardiology was consulted, TTE was completed, and IV diuresis was started. Reduced LVEF was confirmed and aneurysmal wall motion of apical segments. Transitioned to PO diuretics on day of discharge. Started on Entresto and fit with LifeVest. Lost 10 lbs this admission. 07/06/2021: Patient presents for hospital follow-up. Primary complaint today is of dizziness and right LE pain/numbeness and right foot wound. Reports decreased appetite and increased frustration with attempting to adhere to low sodium diet. Did receive "Living With Heart Failure" booklet during admission. Prior to admissions, often would eat canned pastas for his meals. Considering getting Meals of Wheels. Unclear if patient has been taking both tartrate and succinate since discharge. Not wearing LifeVest; dislikes wearing it and left device at home today. Has not been completing daily weights; does not have scale at home. Patient organizes his own meds; VNA in home 2 times weekly. 7/16/2021: Patient is here for follow-up. Patient hospitalized 6/17/20July 7-13 due to right lower extremity ischemia. Underwent right femoral endarterectomy on June 9. Furosemide Entresto held prior to surgery due to low blood pressure. Eventually discharged on low-dose losartan 12.5 mg daily due to soft blood pressure. Furosemide 40 mg daily resumed. Patient reports right lower extremity pain and difficulty bearing weight on the right leg. Otherwise denies shortness of breath, chest pain or lightheadedness. 9/15/21: Here for follow up. Overall doing good. Mainly complaining of residual numbness/tingling on right leg. No shortness of breath, chest pain, palpitations or lightheadedness. 1/25/22: Here for follow up.  S/p BIV ICD placement 12/23/21. Reports lightheadedness "all the time". No feeling faint. Spinning sensation when laying down. Mainly limited by leg pain, not doing much walking. Denied shortness of breath walking from the parking lot to the office and from the waiting area to exam room. 4/26/22: Here for follow up. Decreased furosemide to 20mg daily on last visit. 4/18/22 Na 141 K 3.9 creatinine 0.82. One episode of vertigo about a week ago. Still with intermittent episodes of lightheadedness. Doing PT twice a week. Still having lower extremity nerve pains    7/27/22: Here for follow up. Mainly limited by foot pain. Increased calorie intake, eating more ice cream.    11/22/22: Here for follow up. Getting new treatment for prostate cancer (Pluvitico). Getting right buttock pain and right shoulder pain, taking advil 400mg 3x/day in between tylenol. Occasional lightheadedness. No shortness of breath on current level of exertion, limited walking due to foot pain but able to go up a flight of stairs slowly without shortness of breath, feels tired. 3/15/23: Here for follow up. 2/20/23 Na 142 K 3.7 creatinine 0.79. Reports doing ok from a cardiac standpoint. No shortness of breath or chest pain on current level of exertion. Activities depends on the day. Having good and bad days. Intake also variable. Per VNS, recently with low Bps likely due to decreased oral intake, weight stable. Normotensive on office visit today. Not taking Jardiance, not sure why. "takes what is given to him from pharmacy. 7/14/23: here for follow up. Started jardiance and stopped furosemide on last office visit. CMP 6/21/23 Na 140 K 4.1 and creatinine 0.75. Weight up. Reports eating fatty foods and snacking more. Not active. No leg swelling, PND, or orthopnea. Occasional lightheadedness with getting up fast.     Interval History:   11/20/23: Here for follow up.  Echo 8/2023 showed stable EF at 30-35% and normally functioning bioprosthetic TAVR with mild paravalvular regurgitation. Patient reports gaining weight. He is mostly sitting around. Trying to cut down on snacking. Occasionally does exercises on a chair and doing light weights. No shortness of breath and current level of exertion. No chest pain. No PND, orthopnea or lower extremity edema. Reports taking medications as prescribed. Review of Systems   Constitutional:  Negative for chills and fever. HENT:  Negative for ear pain and sore throat. Eyes:  Negative for pain and visual disturbance. Respiratory:  Negative for cough, chest tightness and shortness of breath. Cardiovascular:  Negative for chest pain, palpitations and leg swelling. Gastrointestinal:  Negative for abdominal distention, abdominal pain, nausea and vomiting. Genitourinary:  Negative for dysuria and hematuria. Musculoskeletal:  Negative for arthralgias and back pain. Skin:  Negative for color change and rash. Neurological:  Negative for dizziness, seizures, syncope and light-headedness. All other systems reviewed and are negative. Past Medical History:   Diagnosis Date    Cancer ([facility]) 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    Coronary artery disease 2001    S/p stenting to circumflex and RCA    HFrEF (heart failure with reduced ejection fraction) ([facility]) 06/14/2021    High cholesterol     Prostate cancer (HCC)          No Known Allergies  .     Current Outpatient Medications:     abiraterone (ZYTIGA) 250 mg tablet, Take 4 tablets (1,000 mg total) by mouth once daily, Disp: 120 tablet, Rfl: 10    acetaminophen (TYLENOL) 500 mg tablet, Take 2 tablets (1,000 mg total) by mouth 3 (three) times a day as needed for mild pain, Disp: , Rfl:     aspirin 81 mg chewable tablet, Chew 1 tablet (81 mg total) daily, Disp: 90 tablet, Rfl: 0    atorvastatin (LIPITOR) 80 mg tablet, Take 1 tablet (80 mg total) by mouth daily with dinner, Disp: 90 tablet, Rfl: 1    Blood Glucose Monitoring Suppl (Accu-Chek Guide Me) w/Device KIT, USE AS DIRECTED, Disp: 1 kit, Rfl: 1    capsicum (ZOSTRIX) 0.075 % topical cream, Apply topically 3 (three) times a day for 7 days Apply on the feet and avoid areas of wounds or scabs, Disp: 60 g, Rfl: 0    cetirizine (ZyrTEC) 5 MG tablet, Take 1 tablet (5 mg total) by mouth daily, Disp: 90 tablet, Rfl: 3    clopidogrel (PLAVIX) 75 mg tablet, Take 1 tablet (75 mg total) by mouth daily, Disp: 90 tablet, Rfl: 0    furosemide (LASIX) 20 mg tablet, Take 2 tablets (40 mg total) by mouth daily, Disp: 60 tablet, Rfl: 0    gabapentin (NEURONTIN) 300 mg capsule, TAKE 2 CAPSULES (600 MG TOTAL) BY MOUTH 2 (TWO) TIMES A DAY TO REDUCE NERVE PAIN IN FEET., Disp: 120 capsule, Rfl: 0    Lancet Devices (Lancing Device) MISC, TID, Disp: 100 each, Rfl: 2    loperamide (IMODIUM) 2 mg capsule, Take 1 capsule (2 mg total) by mouth 4 (four) times a day as needed for diarrhea, Disp: 30 capsule, Rfl: 2    losartan (COZAAR) 25 mg tablet, TAKE 0.5 TABLETS (12.5 MG TOTAL) BY MOUTH DAILY, Disp: 45 tablet, Rfl: 1    methocarbamol (ROBAXIN) 500 mg tablet, Take 1 tablet (500 mg total) by mouth 3 (three) times a day, Disp: 90 tablet, Rfl: 0    metoprolol succinate (TOPROL-XL) 25 mg 24 hr tablet, TAKE HALF TABLET DAILY, Disp: 45 tablet, Rfl: 2    oxyCODONE (ROXICODONE) 5 immediate release tablet, Take 1 tablet (5 mg total) by mouth every 6 (six) hours as needed (cancer pain) Max Daily Amount: 20 mg, Disp: 120 tablet, Rfl: 0    potassium chloride (K-DUR,KLOR-CON) 20 mEq tablet, TAKE 1 TABLET (20 MEQ TOTAL) BY MOUTH DAILY, Disp: 30 tablet, Rfl: 2    predniSONE 5 mg tablet, Take 1 tablet (5 mg total) by mouth 2 (two) times a day with meals, Disp: 60 tablet, Rfl: 5    Diclofenac Sodium (VOLTAREN) 1 %, APPLY 2 G TOPICALLY 4 (FOUR) TIMES A DAY FOR PAIN TO AFFECTED AREA, Disp: 100 g, Rfl: 0    Empagliflozin (JARDIANCE) 10 MG TABS tablet, Take 1 tablet (10 mg total) by mouth every morning, Disp: 30 tablet, Rfl: 11    famotidine (PEPCID) 20 mg tablet, Take 1 tablet (20 mg total) by mouth daily, Disp: 30 tablet, Rfl: 2    ferrous sulfate 325 (65 Fe) mg tablet, Take 1 tablet (325 mg total) by mouth 2 (two) times a day with meals, Disp: 180 tablet, Rfl: 0    Social History     Socioeconomic History    Marital status:      Spouse name: Not on file    Number of children: 3    Years of education: Not on file    Highest education level: Not on file   Occupational History    Not on file   Tobacco Use    Smoking status: Former     Years: 20.00     Types: Cigarettes     Start date: 1962     Quit date: 2002     Years since quittin.4     Passive exposure: Past    Smokeless tobacco: Never   Vaping Use    Vaping Use: Never used   Substance and Sexual Activity    Alcohol use: Not Currently    Drug use: Not Currently    Sexual activity: Not on file   Other Topics Concern    Not on file   Social History Narrative    Single    Son lives in the attic of his home, minimal contact     Social Determinants of Health     Financial Resource Strain: Low Risk  (2023)    Overall Financial Resource Strain (CARDIA)     Difficulty of Paying Living Expenses: Not hard at all   Food Insecurity: No Food Insecurity (2023)    Hunger Vital Sign     Worried About Running Out of Food in the Last Year: Never true     [address] in the Last Year: Never true   Transportation Needs: No Transportation Needs (2023)    PRAPARE - Transportation     Lack of Transportation (Medical): No     Lack of Transportation (Non-Medical):  No   Physical Activity: Not on file   Stress: Not on file   Social Connections: Not on file   Intimate Partner Violence: Not on file   Housing Stability: Unknown (2023)    Housing Stability Vital Sign     Unable to Pay for Housing in the Last Year: No     Number of Places Lived in the Last Year: Not on file     Unstable Housing in the Last Year: No       Family History   Problem Relation Age of Onset    Cancer Mother        Physical Exam:    Vitals: Vitals:    11/20/23 1029   BP: 112/66   Pulse: 73   SpO2: 97%       Physical Exam  Constitutional:       General: He is not in acute distress. Appearance: Normal appearance. HENT:      Head: Normocephalic and atraumatic. Mouth/Throat:      Mouth: Mucous membranes are moist.   Eyes:      General: No scleral icterus. Extraocular Movements: Extraocular movements intact. Cardiovascular:      Rate and Rhythm: Normal rate and regular rhythm. Heart sounds: S1 normal and S2 normal. No murmur heard. No friction rub. No gallop. Comments: Nonelevated JVP  Pulmonary:      Breath sounds: Normal breath sounds. Abdominal:      General: There is no distension. Palpations: Abdomen is soft. Tenderness: There is no abdominal tenderness. There is no guarding or rebound. Musculoskeletal:         General: Normal range of motion. Cervical back: Neck supple. Right lower leg: No edema. Left lower leg: No edema. Skin:     General: Skin is warm and dry. Capillary Refill: Capillary refill takes less than 2 seconds. Neurological:      General: No focal deficit present. Mental Status: He is alert and oriented to person, place, and time.    Psychiatric:         Mood and Affect: Mood normal.         Labs & Results:    Lab Results   Component Value Date    SODIUM 140 10/30/2023    K 4.1 10/30/2023     10/30/2023    CO2 27 10/30/2023    BUN 20 10/30/2023    CREATININE 0.84 10/30/2023    GLUC 96 10/30/2023    CALCIUM 7.7 (L) 10/30/2023     Lab Results   Component Value Date    WBC 7.69 10/30/2023    HGB 10.8 (L) 10/30/2023    HCT 33.8 (L) 10/30/2023     (H) 10/30/2023     10/30/2023     Lab Results   Component Value Date    NTBNP 4,279 (H) 02/14/2022      Lab Results   Component Value Date    CHOLESTEROL 146 09/30/2021     Lab Results   Component Value Date    HDL 35 (L) 09/30/2021     Lab Results   Component Value Date    TRIG 249 (H) 09/30/2021     No results found for: "[address]"    EKG personally reviewed by Lety Mendoza. Counseling / Coordination of Care  Time spent today 25 minutes. Greater than 50% of total time was spent with the patient and / or family counseling and / or coordination of care. We went over current diagnosis, most recent studies and any changes in treatment. Thank you for the opportunity to participate in the care of this patient.     Lety Mendoza MD  ADVANCED HEART FAILURE AND MECHANICAL CIRCULATORY SUPPORT  [address]

## 2023-11-20 ENCOUNTER — OFFICE VISIT (OUTPATIENT)
Dept: CARDIOLOGY CLINIC | Facility: CLINIC | Age: 78
End: 2023-11-20
Payer: COMMERCIAL

## 2023-11-20 VITALS
OXYGEN SATURATION: 97 % | HEART RATE: 73 BPM | BODY MASS INDEX: 36.73 KG/M2 | SYSTOLIC BLOOD PRESSURE: 112 MMHG | DIASTOLIC BLOOD PRESSURE: 66 MMHG | HEIGHT: 67 IN | WEIGHT: 234 LBS

## 2023-11-20 DIAGNOSIS — I73.9 PAD (PERIPHERAL ARTERY DISEASE) (HCC): ICD-10-CM

## 2023-11-20 DIAGNOSIS — I50.22 CHRONIC HFREF (HEART FAILURE WITH REDUCED EJECTION FRACTION) (HCC): Primary | ICD-10-CM

## 2023-11-20 DIAGNOSIS — I50.22 CHRONIC HFREF (HEART FAILURE WITH REDUCED EJECTION FRACTION) (HCC): ICD-10-CM

## 2023-11-20 DIAGNOSIS — I25.10 CORONARY ARTERY DISEASE INVOLVING NATIVE CORONARY ARTERY OF NATIVE HEART WITHOUT ANGINA PECTORIS: ICD-10-CM

## 2023-11-20 DIAGNOSIS — E11.9 DIABETES MELLITUS (HCC): ICD-10-CM

## 2023-11-20 DIAGNOSIS — Z95.2 S/P TAVR (TRANSCATHETER AORTIC VALVE REPLACEMENT): ICD-10-CM

## 2023-11-20 PROCEDURE — 99214 OFFICE O/P EST MOD 30 MIN: CPT | Performed by: INTERNAL MEDICINE

## 2023-11-20 RX ORDER — BLOOD-GLUCOSE METER
EACH MISCELLANEOUS
Qty: 1 KIT | Refills: 0 | Status: SHIPPED | OUTPATIENT
Start: 2023-11-20

## 2023-11-20 NOTE — PATIENT INSTRUCTIONS
Continue current medications  2g sodium diet   2L fluid restriction  Daily weights  Physical activities/exercise as tolerated

## 2023-11-21 RX ORDER — EMPAGLIFLOZIN 10 MG/1
10 TABLET, FILM COATED ORAL EVERY MORNING
Qty: 30 TABLET | Refills: 11 | Status: SHIPPED | OUTPATIENT
Start: 2023-11-21

## 2023-11-24 ENCOUNTER — TELEPHONE (OUTPATIENT)
Dept: HEMATOLOGY ONCOLOGY | Facility: CLINIC | Age: 78
End: 2023-11-24

## 2023-11-24 NOTE — TELEPHONE ENCOUNTER
Received a message from [facility] [place], that a [place] referral is needed for his up coming 12/6/23 [facility] infusion appointment. Reached out to Clyde Peña, [phone] for the [place] referral.Per Margarita Blair is established with the [facility] in [place], He will need to contact his PCP there if he wishes [facility] to cover  the services. Pt will   be contacted. Spoke with Michi Gray, He claims he has no idea what I was talking about regarding a [facility] referral, he asked if I can call back and speak with his son. Called the house phone x2, no answer either times. Voice mail is full, unable to leave a message. Called Rodrigue/ son [phone], he did not answer. A message was left that the pt needs to call directly to his PCP. The [facility] will not take a request from the office, to request a consult and treat.

## 2023-11-28 ENCOUNTER — OFFICE VISIT (OUTPATIENT)
Dept: PALLIATIVE MEDICINE | Facility: CLINIC | Age: 78
End: 2023-11-28
Payer: COMMERCIAL

## 2023-11-28 VITALS
TEMPERATURE: 96.7 F | WEIGHT: 233.69 LBS | DIASTOLIC BLOOD PRESSURE: 80 MMHG | SYSTOLIC BLOOD PRESSURE: 140 MMHG | HEIGHT: 67 IN | HEART RATE: 76 BPM | BODY MASS INDEX: 36.68 KG/M2 | OXYGEN SATURATION: 96 %

## 2023-11-28 DIAGNOSIS — G89.29 CHRONIC MIDLINE LOW BACK PAIN: ICD-10-CM

## 2023-11-28 DIAGNOSIS — G89.3 CANCER-RELATED PAIN: Chronic | ICD-10-CM

## 2023-11-28 DIAGNOSIS — Z51.5 PALLIATIVE CARE PATIENT: ICD-10-CM

## 2023-11-28 DIAGNOSIS — M54.50 CHRONIC MIDLINE LOW BACK PAIN: ICD-10-CM

## 2023-11-28 DIAGNOSIS — I73.9 PAD (PERIPHERAL ARTERY DISEASE) (HCC): ICD-10-CM

## 2023-11-28 PROCEDURE — 99214 OFFICE O/P EST MOD 30 MIN: CPT | Performed by: FAMILY MEDICINE

## 2023-11-28 RX ORDER — GABAPENTIN 300 MG/1
600 CAPSULE ORAL 2 TIMES DAILY
Qty: 120 CAPSULE | Refills: 0 | Status: SHIPPED | OUTPATIENT
Start: 2023-11-28

## 2023-11-28 RX ORDER — METHOCARBAMOL 500 MG/1
500 TABLET, FILM COATED ORAL 3 TIMES DAILY
Qty: 90 TABLET | Refills: 0 | Status: SHIPPED | OUTPATIENT
Start: 2023-11-28

## 2023-11-28 RX ORDER — OXYCODONE HYDROCHLORIDE 5 MG/1
5 TABLET ORAL EVERY 6 HOURS PRN
Qty: 120 TABLET | Refills: 0 | Status: SHIPPED | OUTPATIENT
Start: 2023-11-28

## 2023-11-28 NOTE — Clinical Note
Pt due to lose VA auth for ongoing cancer care in our Network next week, 12/6, unless he gets a new referral.  It is not clear to me that pt has any understanding of why this is required, nor how to seek the appropriate referral order. Osmany Jacinto, if you could, your assistance in this matter may be very helpful. I have notified Aging today by phone of this potential gap in his care.

## 2023-11-28 NOTE — PROGRESS NOTES
Outpatient Follow-Up - Palliative and Supportive Care   Ranjeet Rene 66 y.o. male [id number]    Assessment & Plan  Problem List Items Addressed This Visit    None  Medications adjusted this encounter:  Requested Prescriptions      No prescriptions requested or ordered in this encounter     No orders of the defined types were placed in this encounter. There are no discontinued medications. - Ongoing close f/up in Palliative clinic. Next visit 4 wks. - No changes to opioids; will not increase while no objective sign of worsening illness  - Pt continues to be an ongoing risk for medication misuse:   = blind in one eye, presbycusis, limited medical savvy with very limited oversight from his family in medication checks. = Meds have been blisterpacked; unclear if pt has spare meds wandering about his home. = Limited health literacy - does not know names of meds; has trouble reading bottles d/t visual impairment. - If none of patient's children can act as financial or medical mohr of atty, for various reasons, we may need to petition for guardianship in order to accomplish the goal of keeping pt in his home, and pursing treatments to fight his cancer.  - Pt would still need VA PCP.   = Pt will lose coverage for cancer care next week. = Pt was not covered by VA benefits to see us this week. Natalie Thao was seen today for symptoms and planning cares related to above illnesses. I have reviewed the patient's controlled substance dispensing history in the Prescription Drug Monitoring Program in compliance with the [facility] regulations before prescribing any controlled substances. They are invited to continue to follow with us. If there are questions or concerns, please contact us through our clinic/answering service 24 hours a day, seven days a week.     Geremias Triana [facility] Palliative and Supportive Care  [phone]      Visit Information    Accompanied By: none    Source of History: Self    History Limitations: limited health literacy, dementia    Contacts: son Justin Abdul - [phone]               Daughter Yanely Wood - [phone]  [phone] - Ck Tejeda, [address] [place] [address]  for Aging (routes to [employer] as of 11/28/2023 )  [phone] - Amanda [facility]  for Aging. History of Present Illness      Lona Rene is a 66 y.o. male who presents in follow up of symptoms related to Stage IV prostate cancer, hormone treatment resistant. He follows with Dr. Orville Kumar and Ms Caty Sidhu for this. There is also a significant cardiovascular history, with heart failure management by Dr. Solares's [facility], and PAD with distal pain at rest in both feet. He has an ICD for a h/o VT, and underwent TAVR in 6/2021. Pertinent issues include: symptom management, resource management. Since last visit, he has seen a new PCP in [facility], as well as his Cards provider. He appears to be following their medical recs well enough, though his adherence to dietary resrictions remains an open question. This stated, his wts have been stable, and he does not endorse HF symptoms such as cough, dyspnea, exercise limitation. Unfortunately, he continues to have inadequate care coordination through his family. I have been continually unable to confer with his son, who lives in the home. Pt continues to state his other children work too much to assist with his cares. It seems this will rapidly become a giant barrier in the patient's care, as his VA benefits are locked behind a VA PCP in [place] that the pt has not seen in some time. Pt will not be able to get [facility] care for Oncology -- including his regular treatments -- unless he is granted a referral by his [place] PCP prior to 12/6/23, roughly one week from today.   Importantly, pt did not receive auth from [place] PCP for today's visit      As we have documented extensively, the patient lacks perceptural and cognitive ability to manage heart failure independently, and his family do not provide adequate support. Pt notes today that due to his lack of  license, and his inadequate family support, he cannot imagine a way that he would physically get to the [facility] to obtain necessary referral.  When he attempts phone calls to the [place], he is unable to successfully navigate the phone trees. Attempts made to reach his [facility] caseworkers today, and messages sent to pt's Cancer Care team to make them aware of his financial / resource challenges. Phone call not attempted today, as we have been informed that pt's son will not answer phone before 2pm daily. Pt's daughter Ronny De La Paz has never been helpful in care coordination in our experience. - Vision challenge -  He has fogginess in the far vision, and cannot read effectively. 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 - Heart failure - Advised by Cards to use 2g Na / 2L H2O diet, as well as daily wts  - Vascular health - 4/26/23, pt saw Dr. Karla Rodriguez in office, and it seems that the plan ongoing is for DAPT life-long, and deferral of further cares to Podiatry. Pt has not seen Podiatry since 3/28/23, as of 11/28/2023.  - Care coordination - Pt needs a referral from [place] in [facility] to continue cares with Onc in Network. Pt completely unable to manage this issue on his own; is unaware of his PCP, nor the systems involved in VA's gatekeeping to specialty care / 's Choice program.  Pt will lose authorization for his cancer cares, and be unable to maintain therapy as rec'd, starting on 12/6/23.  - Pamela Sloan has been effectively managing pt's medical illness, as noted by sustained suppression of his PSA on repeat measurements on current therapies.   Ms Winona Joanie has been helpful in some degree of care coordinatoin re: pt's financial difficulties    - Vulnerable adult - He has had [facility] set up, but he reports [facility] would not take him home from treatments and tests. Has Meals on Wheels weekdaily for midday meal only. He can prep breakfast, but not any other meals. Pt's son Vonda Johnson presented with him on 3/30/2023, and states that there are no waiver services set up, there are no formal mohr of atty, and that Vonda Johnson himself cannot be relied upon for transport, coordination, or medical supervision at this time. If waiver services could be gained, Vonda Johnson would wish to be the beneficiary of caregiver hours. We discussed today that it would then be best to have daughter Elayne Giraldo have both medical and financial mohr of atty. Elayne Giraldo has not returned my calls from office since as recently as 9/26/23. Previously, pt reports that he is not eating well because he cannot cook for himself. He reports that his son is getting some store-bought food, but not prepping any meals. Pt reports that his daughter Elayne Giraldo is unable to devote any time to assist the family. Pt's last son Russel Jung lives in [place], and has not expressed any interest in providing assistance nor support. Pt pays mortgage on his home, and has not added anyone to the deed/title. He reports that this is due to GI bill provisions, and since Alec is not a , he cannot have the home through this mortgage. [address] [place] [address], and expressed our concerns re: "porch pirates". Pharmacist confirms that abiraterone is NOT supplied by their pharmacy, and that opioids are sign-on-delivery, separate from blisterpacks. He does not know how to get care thru [facility], though one of his [place] provider told him he would need to see a specialist.   He also endorses losing items about his home, and a concern about his memory. Chores about the house are nearly impossible because he cannot bend over, and he can't stand for more than about 3min at a time. This complicates his ability to cook for self as well.   Since abscess formed in his R hallux, he is unable to shower, given his inability to stand, as well as not knowing if he can get his foot wet. Pt is and has been homebound for several months    - Home DME, supports -    = Pt has stairglide/chairlift in home. Confirmed that this was resolved and improved by home health RN 3/30/23.   = Transport - Relies on [facility] for all support. Family unhelpful. Pt's 's license revoked by [facility], pending medical evaluation and re-testing. From our first visit on 3/1/22:    "Pt has demonstrated -- over a year ago now -- progression of his illness (rising PSA and decreasing functional status) despite aggressive therapies and cytotoxic treatments. His side effects vs decompensation in his overall illness was so severe he was admitted to [address] in march 2021. Since that time, he has recovered some function with careful management of his cardiac disease, and less aggressive, less toxic cancer cares. At this time, his treatment plan involves Xofigo for bone-avid disease, +/- Lupron. We note that the patient has demonstrated some disease progression on other hormonal blockers, but that this plan of care is being offered, if not rec'd, by Dr. Lyle Kapoor. Pt presents to our office with suboptimally controlled pain, ongoing wt loss, poor appetite, and general malaise. His concerns are more related to his back and R foot pain; a Podiatrist today advised him that his unilateral neuropathic symptoms 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. Today, he can't quite recall his medications, he just insists that he needs 'something stronger than tynlenol'  When we pointed out that he has used tramadol and oxyIR recently, he also stated that these medications were not helpful. Pt endorses support from his son, who will drive pt to appts and on other errands.    Pt can't name his meds, but has some vague understanding of the indications for his meds."    Past medical, surgical, social, and family histories are reviewed and pertinent updates are made. Review of Systems   Unable to perform ROS: Dementia         Vital Signs    /80 (BP Location: Left arm, Patient Position: Sitting, Cuff Size: Standard)   Pulse 76   Temp (!) 96.7 °F (35.9 °C) (Temporal)   Ht 5' 7" (1.702 m)   Wt 48.5 kg (106 lb 14.4 oz)   SpO2 96%   BMI 16.74 kg/m²     Physical Exam and Objective Data  Physical Exam  Constitutional:       General: He is not in acute distress. Appearance: He is ill-appearing. He is not toxic-appearing or diaphoretic. Comments: Frail, overweight   HENT:      Head: Normocephalic and atraumatic. Right Ear: External ear normal.      Left Ear: External ear normal.   Eyes:      General:         Right eye: No discharge. Left eye: No discharge. Conjunctiva/sclera: Conjunctivae normal.      Pupils: Pupils are equal, round, and reactive to light. Neck:      Trachea: No tracheal deviation. Cardiovascular:      Rate and Rhythm: Normal rate and regular rhythm. Pulmonary:      Effort: Pulmonary effort is normal. No respiratory distress. Breath sounds: No stridor. Abdominal:      General: There is no distension. Palpations: Abdomen is soft. Comments: overweight   Skin:     General: Skin is warm and dry. Coloration: Skin is pale. Findings: No erythema or rash. Neurological:      General: No focal deficit present. Mental Status: Mental status is at baseline. He is disoriented. Cranial Nerves: No cranial nerve deficit. Psychiatric:      Comments: Judgment limited, insight poor       Radiology and Laboratory:  I personally reviewed and interpreted the following results: none new    I have spent a total time of 40+ minutes on 11/28/23 in caring for this patient including chart review; symptom pursuit; supportive listening; anticipatory guidance.  review and assessment of decisional apparatus and capacity, applicable legal codes and extant documents; attempts to coord care by phone with pt's Cty worker(s).

## 2023-11-29 ENCOUNTER — PATIENT OUTREACH (OUTPATIENT)
Dept: CASE MANAGEMENT | Facility: OTHER | Age: 78
End: 2023-11-29

## 2023-11-29 ENCOUNTER — TELEPHONE (OUTPATIENT)
Dept: HEMATOLOGY ONCOLOGY | Facility: CLINIC | Age: 78
End: 2023-11-29

## 2023-11-29 NOTE — PROGRESS NOTES
OSW received in basket message from Dr. Kalpesh Singh regarding pt needing a referral from [facility] for continued oncology care. OSW sent email to manager, Prince Manjarrez, for guidance. He emailed Thee Carlisle, who will have staff address.

## 2023-11-29 NOTE — TELEPHONE ENCOUNTER
Per [facility] , Pt needs to call PCP to request Referral. Called pts son and left detailed message. Called Pt and he stated he had called and that the message at [place] said they were busy and to call back Wednesday and he  stated that he would.

## 2023-11-30 ENCOUNTER — TELEPHONE (OUTPATIENT)
Dept: PALLIATIVE MEDICINE | Facility: HOSPITAL | Age: 78
End: 2023-11-30

## 2023-11-30 ENCOUNTER — TELEPHONE (OUTPATIENT)
Dept: HEMATOLOGY ONCOLOGY | Facility: CLINIC | Age: 78
End: 2023-11-30

## 2023-11-30 NOTE — TELEPHONE ENCOUNTER
Called the patient's primary care office. They will call the [address] [place] [address] and request a referral for the patient's upcoming treatment appointment next week. Would you biopsy this patient?

## 2023-12-05 ENCOUNTER — TELEPHONE (OUTPATIENT)
Age: 78
End: 2023-12-05

## 2023-12-05 ENCOUNTER — TELEPHONE (OUTPATIENT)
Dept: HEMATOLOGY ONCOLOGY | Facility: CLINIC | Age: 78
End: 2023-12-05

## 2023-12-05 NOTE — TELEPHONE ENCOUNTER
Mona Harada from [facility] called in check if the referral from [place] for the following patient is ready. Since patient has an therapy appointment tomorrow. Ritchie transferred the call to practice.

## 2023-12-05 NOTE — TELEPHONE ENCOUNTER
Called PCP again to check VA referral status. I was told that they do not request referrals from the [place]. Call placed to [place] at the [place] but got her voicemail.

## 2023-12-06 ENCOUNTER — HOSPITAL ENCOUNTER (OUTPATIENT)
Dept: INFUSION CENTER | Facility: CLINIC | Age: 78
End: 2023-12-06

## 2023-12-07 ENCOUNTER — TELEPHONE (OUTPATIENT)
Dept: LAB | Facility: HOSPITAL | Age: 78
End: 2023-12-07

## 2023-12-07 ENCOUNTER — TELEPHONE (OUTPATIENT)
Dept: HEMATOLOGY ONCOLOGY | Facility: CLINIC | Age: 78
End: 2023-12-07

## 2023-12-07 NOTE — TELEPHONE ENCOUNTER
Spoke To Cristina Hester at [address] referral  11/10/23 through [facility]. She stated he changed to be cared for through the [facility], and was unable to renew referral through [facility]. Then called Pt to let him know, He stated that he just went to [facility]. Pt is unsure what is going on. States he has an appt w a PCP on  and to use his Swivl for his  appt with Dr Merlyn Pollock.

## 2023-12-09 DIAGNOSIS — C61 PROSTATE CANCER (HCC): ICD-10-CM

## 2023-12-11 ENCOUNTER — APPOINTMENT (OUTPATIENT)
Dept: LAB | Facility: HOSPITAL | Age: 78
End: 2023-12-11
Payer: COMMERCIAL

## 2023-12-11 DIAGNOSIS — C61 PROSTATE CANCER (HCC): ICD-10-CM

## 2023-12-11 LAB — PSA SERPL-MCNC: 26.51 NG/ML (ref 0–4)

## 2023-12-11 PROCEDURE — 36415 COLL VENOUS BLD VENIPUNCTURE: CPT

## 2023-12-11 PROCEDURE — 84153 ASSAY OF PSA TOTAL: CPT

## 2023-12-11 RX ORDER — PREDNISONE 5 MG/1
5 TABLET ORAL 2 TIMES DAILY WITH MEALS
Qty: 60 TABLET | Refills: 5 | Status: SHIPPED | OUTPATIENT
Start: 2023-12-11

## 2023-12-12 ENCOUNTER — OFFICE VISIT (OUTPATIENT)
Dept: HEMATOLOGY ONCOLOGY | Facility: CLINIC | Age: 78
End: 2023-12-12
Payer: COMMERCIAL

## 2023-12-12 VITALS
WEIGHT: 235 LBS | SYSTOLIC BLOOD PRESSURE: 112 MMHG | HEIGHT: 67 IN | OXYGEN SATURATION: 96 % | BODY MASS INDEX: 36.88 KG/M2 | HEART RATE: 94 BPM | RESPIRATION RATE: 17 BRPM | TEMPERATURE: 97.1 F | DIASTOLIC BLOOD PRESSURE: 70 MMHG

## 2023-12-12 DIAGNOSIS — C61 PROSTATE CANCER (HCC): Primary | ICD-10-CM

## 2023-12-12 DIAGNOSIS — E87.6 HYPOKALEMIA: ICD-10-CM

## 2023-12-12 PROCEDURE — 99214 OFFICE O/P EST MOD 30 MIN: CPT | Performed by: INTERNAL MEDICINE

## 2023-12-12 RX ORDER — POTASSIUM CHLORIDE 20 MEQ/1
20 TABLET, EXTENDED RELEASE ORAL DAILY
Qty: 30 TABLET | Refills: 2 | Status: SHIPPED | OUTPATIENT
Start: 2023-12-12 | End: 2024-03-11

## 2023-12-12 NOTE — PROGRESS NOTES
Hematology Outpatient Follow - Up Note  Nazia Rene 66 y.o. male MRN: @ Encounter: [id number]        Date:  12/12/2023        Assessment/ Plan:    Heavily pretreated metastatic prostate Cancer previously treated at the [place]. transferred care to [facility] in October 2021. History of Francisco Javier 7 prostate cancer s/p treatment with external beam radiation 7200cGy from 10/14/2003 to 12/11/2003. He also got neoadjuvant and adjuvant LHRH for 9 months after radiation. July 2008 with a rising PSA and bony metastasis identified. initiated on Lupron      PSA was increasing and bone scan identified new lesions  3/13/2015 he was initiated on abiraterone        6/2017 progression of bony metastatic disease on imaging. Treatment changed to enzalutamide. 2/2019 palliative Radiation to sacrum     March 2020 - March 2021 Docetaxel. 3/2021 - 10/2021 no cancer directed treatment as he had significant cardiac issues and was seen in [facility].     10/2021 Transferred care from [place] to our practice     Lupron every 3 months     Xofigo from 11/4/2021 to 4/2022 5/2022 rechallenged with enzalutamide   8/2022 Brayden Tawny was added for bone metastasis     Currently on  first dose on May 13, 2023     PSA came down significantly after 3 cycles to 37 7/10/23 (245 5/15/23)  PSA 24 10/12/23    Dose #6 on 12/14/2023    After 6 to 8 weeks we will do CBC, CMP, PSA        Labs and imaging studies are reviewed by ordering provider once results are available. If there are findings that need immediate attention, you will be contacted when results available. Discussing results and the implication on your healthcare is best discussed in person at your follow-up visit. HPI:    Coretta Valle is a 66year old male seen initially by Dr. Michele Gibson October 5, 2021 regarding metastatic castration resistant prostate cancer. He has multiple medical conditions including a significant cardiovascular history, heart failure, PAD.   He was previously treated at the [place]. Per review of his EMR, [facility] records he was initially diagnosed with a Francisco Javier 7 prostate cancer s/p treatment with external beam radiation 7200cGy from 10/14/2003 to 12/11/2003. He also got neoadjuvant and adjuvant LHRH for 9 months after radiation. He was observed until July of 2008 when he was noted to have rising PSA and imaging done at that time showed new bony lesions. He received Zoladex and later leuprolide. PSA was increasing and bone scan identified new lesions. 3/13/2015 he was initiated on abiraterone     6/2017 He had evidence of aggressive bony metastatic disease on imaging. Treatment changed to Enzalutamide. His PSA continued to rise and he had progressive bony metastases including to the sacrum for which he received a course of palliative radiation, which was completed in February of 2019. It was 3000 cGy as of radiation in 10 fractions. He was then restarted on enzalutamide but a scan in December of 2019 showed worsening bone metastases so he was started on docetaxel in March of 2020. He was kept on this for approximately a year with his PSA slowly rising. Cycle 15 1/28/2021. After March 2021, he was admitted to a hospital for cardiac issues and then was lost to follow up with his previous oncologist.     Established care with Dr. Janeth Ramos  10/2021.    11/2021 He was initiated on Lupron every 3 months     He was referred to nuclear Medicine and treated with Sheri Al from 11/4/2021 to 4/2022 5/2022 rechallenged with enzalutamide   8/2022 Bridges Jhonathaning was added for bone metastasis      2/2023  plans to start Pluvicto     He received Pluvicto the first dose on May 18  2023-12/14/2023     After 3 cycles PSA came down to 32  Interval History:        Previous Treatment:         Test Results:    Imaging: No results found.     Labs:   Lab Results   Component Value Date    WBC 4.57 12/11/2023    HGB 10.9 (L) 12/11/2023    HCT 34.7 (L) 12/11/2023    MCV 98 12/11/2023     12/11/2023     Lab Results   Component Value Date    K 3.5 12/11/2023     12/11/2023    CO2 26 12/11/2023    BUN 16 12/11/2023    CREATININE 0.91 12/11/2023    GLUF 96 10/12/2023    CALCIUM 8.5 12/11/2023    CORRECTEDCA 8.2 (L) 04/10/2023    AST 22 12/11/2023    ALT 23 12/11/2023    ALKPHOS 88 12/11/2023    EGFR 80 12/11/2023       No results found for: "IRON", "TIBC", "FERRITIN"    No results found for: "DGVWJNLT78"      ROS: Review of Systems   Constitutional:  Positive for fatigue. Negative for appetite change, chills, diaphoresis, fever and unexpected weight change. HENT:   Negative for hearing loss, lump/mass, mouth sores, nosebleeds, sore throat, trouble swallowing and voice change. Eyes: Negative. Negative for eye problems and icterus. Respiratory: Negative. Negative for chest tightness, cough, hemoptysis and shortness of breath. Cardiovascular:  Negative for chest pain and leg swelling. Gastrointestinal:  Negative for abdominal distention, abdominal pain, blood in stool, constipation, diarrhea and nausea. Endocrine: Negative. Genitourinary:  Negative for dysuria, frequency, hematuria and pelvic pain. Musculoskeletal:  Positive for arthralgias and back pain. Negative for flank pain, gait problem, myalgias and neck stiffness. Skin:  Negative for itching and rash. Neurological:  Negative for dizziness, gait problem, headaches, light-headedness, numbness and speech difficulty. Hematological:  Negative for adenopathy. Does not bruise/bleed easily. Psychiatric/Behavioral:  Negative for confusion, decreased concentration, depression and sleep disturbance. The patient is not nervous/anxious. Current Medications: Reviewed  Allergies: Reviewed  PMH/FH/SH:  Reviewed      Physical Exam:    Body surface area is 2.17 meters squared.     Wt Readings from Last 3 Encounters:   12/12/23 107 kg (235 lb)   11/28/23 106 kg (233 lb 11 oz)   11/20/23 106 kg (234 lb)        Temp Readings from Last 3 Encounters:   23 (!) 97.1 °F (36.2 °C) (Temporal)   23 (!) 96.7 °F (35.9 °C) (Temporal)   23 98.1 °F (36.7 °C)        BP Readings from Last 3 Encounters:   23 112/70   23 140/80   23 112/66         Pulse Readings from Last 3 Encounters:   23 94   23 76   23 73        Physical Exam  Vitals reviewed. Constitutional:       General: He is not in acute distress. Appearance: He is well-developed. He is not diaphoretic. HENT:      Head: Normocephalic and atraumatic. Eyes:      Conjunctiva/sclera: Conjunctivae normal.   Neck:      Trachea: No tracheal deviation. Cardiovascular:      Rate and Rhythm: Normal rate and regular rhythm. Heart sounds: No murmur heard. No friction rub. No gallop. Pulmonary:      Effort: Pulmonary effort is normal. No respiratory distress. Breath sounds: Normal breath sounds. No wheezing or rales. Chest:      Chest wall: No tenderness. Abdominal:      General: There is no distension. Palpations: Abdomen is soft. Tenderness: There is no abdominal tenderness. Musculoskeletal:      Cervical back: Normal range of motion and neck supple. Right lower leg: No edema. Left lower leg: No edema. Lymphadenopathy:      Cervical: No cervical adenopathy. Skin:     General: Skin is warm and dry. Coloration: Skin is not pale. Findings: No erythema. Neurological:      Mental Status: He is alert and oriented to person, place, and time. Psychiatric:         Behavior: Behavior normal.         Thought Content: Thought content normal.         Judgment: Judgment normal.         ECO    Goals and Barriers:  Current Goal: Minimize effects of disease. Barriers: None. Patient's Capacity to Self Care:  Patient is able to self care.     Code Status: @[facility]@

## 2023-12-13 ENCOUNTER — HOSPITAL ENCOUNTER (OUTPATIENT)
Dept: INFUSION CENTER | Facility: CLINIC | Age: 78
Discharge: HOME | End: 2023-12-13
Payer: COMMERCIAL

## 2023-12-13 VITALS — BODY MASS INDEX: 36.65 KG/M2 | WEIGHT: 234 LBS

## 2023-12-13 DIAGNOSIS — C79.51 MALIGNANT NEOPLASM METASTATIC TO BONE (HCC): ICD-10-CM

## 2023-12-13 DIAGNOSIS — C61 PROSTATE CANCER (HCC): Primary | ICD-10-CM

## 2023-12-13 DIAGNOSIS — Z95.828 PORT-A-CATH IN PLACE: ICD-10-CM

## 2023-12-13 PROCEDURE — 96402 CHEMO HORMON ANTINEOPL SQ/IM: CPT

## 2023-12-13 PROCEDURE — 96372 THER/PROPH/DIAG INJ SC/IM: CPT

## 2023-12-13 RX ADMIN — DENOSUMAB 120 MG: 120 INJECTION SUBCUTANEOUS at 09:17

## 2023-12-13 RX ADMIN — LEUPROLIDE ACETATE 22.5 MG: KIT at 09:20

## 2023-12-13 NOTE — PROGRESS NOTES
Patient presents today for Xgeva and Lupron injections. Patient offers no complaints. VSS. Labs from 12/11/2023 reviewed and within parameters to treat. Lupron injection administered into left outer gluteal area and Xgeva injection administered into left arm, tolerated both without incident. Patient to make additional appointments upon exit. AVS offered and declined.

## 2023-12-13 NOTE — PROGRESS NOTES
R PAC accessed without issue, brisk blood return noted, port flushed well without resistance. Deaccessed, bandaid in place. [facility] to be contacted by  staff, ambulatory upon DC with walker.

## 2023-12-14 ENCOUNTER — HOSPITAL ENCOUNTER (OUTPATIENT)
Dept: NUCLEAR MEDICINE | Facility: HOSPITAL | Age: 78
End: 2023-12-14
Payer: COMMERCIAL

## 2023-12-14 DIAGNOSIS — C61 MALIGNANT NEOPLASM OF PROSTATE (HCC): ICD-10-CM

## 2023-12-14 PROCEDURE — 79101 NUCLEAR RX IV ADMIN: CPT

## 2023-12-14 PROCEDURE — A9607 HB LUTETIUM LU 177 VIPIVOTIDE TETRAXETAN, THERAPEUTIC, 1 MILLICURIE: HCPCS

## 2023-12-15 ENCOUNTER — HOSPITAL ENCOUNTER (OUTPATIENT)
Dept: NUCLEAR MEDICINE | Facility: HOSPITAL | Age: 78
Discharge: HOME | End: 2023-12-15
Payer: COMMERCIAL

## 2023-12-15 DIAGNOSIS — C61 MALIGNANT NEOPLASM OF PROSTATE (HCC): ICD-10-CM

## 2023-12-15 PROCEDURE — 78832 RP LOCLZJ TUM SPECT W/CT 2: CPT

## 2023-12-18 ENCOUNTER — TELEPHONE (OUTPATIENT)
Dept: HEMATOLOGY ONCOLOGY | Facility: CLINIC | Age: 78
End: 2023-12-18

## 2023-12-18 NOTE — TELEPHONE ENCOUNTER
Spoke with pt who tells me he is taking Zytiga daily. Denies any issues. Last delivery per [facility] 12/15.

## 2023-12-19 DIAGNOSIS — I73.9 PAD (PERIPHERAL ARTERY DISEASE) (HCC): ICD-10-CM

## 2023-12-19 RX ORDER — GABAPENTIN 300 MG/1
CAPSULE ORAL
Qty: 120 CAPSULE | Refills: 0 | Status: SHIPPED | OUTPATIENT
Start: 2023-12-19

## 2023-12-20 ENCOUNTER — TELEPHONE (OUTPATIENT)
Dept: HEMATOLOGY ONCOLOGY | Facility: CLINIC | Age: 78
End: 2023-12-20

## 2023-12-20 NOTE — TELEPHONE ENCOUNTER
Jazmine calling to confirm when patient was last seen and his next follow up visit.  I informed Jazmine that patient was last seen on 12/12/23 and his next appointment is 2/19/24.  Jazmine verbalized her understanding.

## 2023-12-28 ENCOUNTER — OFFICE VISIT (OUTPATIENT)
Dept: PALLIATIVE MEDICINE | Facility: CLINIC | Age: 78
End: 2023-12-28
Payer: COMMERCIAL

## 2023-12-28 VITALS
DIASTOLIC BLOOD PRESSURE: 64 MMHG | BODY MASS INDEX: 35.57 KG/M2 | OXYGEN SATURATION: 96 % | TEMPERATURE: 96.9 F | SYSTOLIC BLOOD PRESSURE: 130 MMHG | RESPIRATION RATE: 20 BRPM | HEART RATE: 91 BPM | WEIGHT: 226.63 LBS | HEIGHT: 67 IN

## 2023-12-28 DIAGNOSIS — Z51.5 PALLIATIVE CARE PATIENT: ICD-10-CM

## 2023-12-28 DIAGNOSIS — G89.3 CANCER-RELATED PAIN: Chronic | ICD-10-CM

## 2023-12-28 DIAGNOSIS — M54.50 CHRONIC MIDLINE LOW BACK PAIN: ICD-10-CM

## 2023-12-28 DIAGNOSIS — G89.29 CHRONIC MIDLINE LOW BACK PAIN: ICD-10-CM

## 2023-12-28 PROCEDURE — 99213 OFFICE O/P EST LOW 20 MIN: CPT | Performed by: FAMILY MEDICINE

## 2023-12-28 RX ORDER — METHOCARBAMOL 500 MG/1
500 TABLET, FILM COATED ORAL 3 TIMES DAILY
Qty: 90 TABLET | Refills: 0 | Status: SHIPPED | OUTPATIENT
Start: 2023-12-28

## 2023-12-28 RX ORDER — OXYCODONE HYDROCHLORIDE 5 MG/1
5 TABLET ORAL EVERY 6 HOURS PRN
Qty: 120 TABLET | Refills: 0 | Status: SHIPPED | OUTPATIENT
Start: 2023-12-28

## 2023-12-28 RX ORDER — OXYCODONE HYDROCHLORIDE 5 MG/1
5 TABLET ORAL EVERY 6 HOURS PRN
Qty: 120 TABLET | Refills: 0 | Status: SHIPPED | OUTPATIENT
Start: 2023-12-28 | End: 2023-12-28 | Stop reason: SDUPTHER

## 2023-12-28 NOTE — PROGRESS NOTES
Outpatient Follow-Up - Palliative and Supportive Care   Royce Rene 78 y.o. male [id number]    Assessment & Plan  Problem List Items Addressed This Visit       Cancer-related pain (Chronic)    Palliative care patient (Chronic)     Other Visit Diagnoses       Chronic midline low back pain        Relevant Medications    methocarbamol (ROBAXIN) 500 mg tablet        Medications adjusted this encounter:  Requested Prescriptions     Signed Prescriptions Disp Refills    methocarbamol (ROBAXIN) 500 mg tablet 90 tablet 0     Sig: Take 1 tablet (500 mg total) by mouth 3 (three) times a day     No orders of the defined types were placed in this encounter.    Medications Discontinued During This Encounter   Medication Reason    methocarbamol (ROBAXIN) 500 mg tablet Reorder    oxyCODONE (ROXICODONE) 5 immediate release tablet Reorder        - Ongoing close f/up in Palliative clinic.  Next visit 4 wks.  - No changes to opioids; will not increase while no objective sign of worsening illness  - Pt continues to be an ongoing risk for medication misuse:   = blind in one eye, presbycusis, limited medical savvy with very limited oversight from his family in medication checks.   = Meds have been blisterpacked; unclear if pt has spare meds wandering about his home.   = Limited health literacy - does not know names of meds; has trouble reading bottles d/t visual impairment.  - If none of patient's children can act as financial or medical mohr of atty, for various reasons, we may need to petition for guardianship in order to accomplish the goal of keeping pt in his home, and pursing treatments to fight his cancer.  - Pt would still need [facility] PCP.      Royce Rene was seen today for symptoms and planning cares related to above illnesses.  I have reviewed the patient's controlled substance dispensing history in the Prescription Drug Monitoring Program in compliance with the [place] regulations before prescribing any controlled substances.    They  are invited to continue to follow with us.  If there are questions or concerns, please contact us through our clinic/answering service 24 hours a day, seven days a week.    Gavin Andrew  [facility]  [phone]      Visit Information    Accompanied By: none    Source of History: Self    History Limitations: limited health literacy, dementia    Contacts: son Rodrigue Rene - [phone]               Daughter Jessica Downey - [phone] - Neno Monteiro, [facility] (routes to Tracy Irvinuer as of 11/28/2023 )  [phone] - Velia Albright, [facility].    History of Present Illness      Royce Rene is a 78 y.o. male who presents in follow up of symptoms related to Stage IV prostate cancer, hormone treatment resistant.  He follows with Dr. Hernandez and Ms Philip for this.  There is also a significant cardiovascular history, with heart failure management by Dr. Trejo, and PAD with distal pain at rest in both feet.  He has an ICD for a h/o VT, and underwent TAVR in 6/2021.  Pertinent issues include: symptom management, resource management.      Since last visit, he continues to limp along in his cares.  He does feel generally well, if not troubled by ongoing back and foot pain.  He feels his breathing is stable, and his leg swelling is not worsening.  He arrives alone, and there are no family nearby to augment his history, which is piecemeal.  He arrived by [employer] today.        he has seen a new PCP in [place], as well as his Cards provider.  He appears to be following their medical recs well enough, though his adherence to dietary resrictions remains an open question.  This stated, his wts have been stable, and he does not endorse HF symptoms such as cough, dyspnea, exercise limitation.      Phone call not to family attempted today, as we have been informed that pt's son will not answer phone before 2pm daily.  Pt's daughter Kush has  never been helpful in care coordination in our experience, reportedly, she has singed off his case for any contact or care..    - Vision challenge -  He has fogginess in the far vision, and cannot read effectively.  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    - Heart failure - Advised by Cards to use 2g Na / 2L H2O diet, as well as daily wts  - Vascular health - 4/26/23, pt saw Dr. Abbasi in office, and it seems that the plan ongoing is for DAPT life-long, and deferral of further cares to Podiatry.  Pt has not seen Podiatry since 3/28/23, as of 11/28/2023.  - Care coordination - Pt needs [facility] from [facility] [place] to continue cares with Onc in [facility].  Pt completely unable to manage this issue on his own; is unaware of his PCP, nor the systems involved in VA's gatekeeping to specialty care / 's Choice program.  Pt will lose authorization for his cancer cares, and be unable to maintain therapy as rec'd, starting on 12/6/23.  - Oncology Care - Dr. Hernandez has been effectively managing pt's medical illness, as noted by sustained suppression of his PSA on repeat measurements on current therapies.  Ms Keating of [facility] has been helpful in some degree of care coordinatoin re: pt's financial difficulties    - Vulnerable adult - He has had [facility] [employer] set up, but he reports [facility] would not take him home from treatments and tests.  Has Meals on Wheels weekdaily for midday meal only.  He can prep breakfast, but not any other meals.    Pt's son Rodrigue presented with him on 3/30/2023, and states that there are no waiver services set up, there are no formal mohr of atty, and that Rodrigue himself cannot be relied upon for transport, coordination, or medical supervision at this time.    If waiver services could be gained, Rodrigue would wish to be the beneficiary of caregiver hours.  We discussed today that it would then be best to have daughter Charee have both  "medical and financial mohr of atty.  Kush has not returned my calls from office since as recently as 9/26/23.    Previously, pt reports that he is not eating well because he cannot cook for himself.  He reports that his son is getting some store-bought food, but not prepping any meals.  Pt reports that his daughter Kush is unable to devote any time to assist the family.  Pt's last son Royce Bermeo lives in [place], and has not expressed any interest in providing assistance nor support.    Pt pays mortgage on his home, and has not added anyone to the deed/title.  He reports that this is due to GI bill provisions, and since Alec is not a , he cannot have the home through this mortgage.    Called [facility], and expressed our concerns re: \"porch pirates\".  Pharmacist confirms that abiraterone is NOT supplied by their pharmacy, and that opioids are sign-on-delivery, separate from blisterpacks.     He does not know how to get care thru VA system, though one of his VA provider told him he would need to see a specialist.   He also endorses losing items about his home, and a concern about his memory.  Chores about the house are nearly impossible because he cannot bend over, and he can't stand for more than about 3min at a time.  This complicates his ability to cook for self as well.  Since abscess formed in his R hallux, he is unable to shower, given his inability to stand, as well as not knowing if he can get his foot wet.  Pt is and has been homebound for several months    - Home DME, supports -    = Pt has stairglide/chairlift in home.  Confirmed that this was resolved and improved by home health RN 3/30/23.   = Transport - Relies on [employer] for all support.  Family unhelpful.  Pt's 's license revoked by State, pending medical evaluation and re-testing.      From our first visit on 3/1/22:    \"Pt has demonstrated -- over a year ago now -- progression of his illness (rising PSA and " "decreasing functional status) despite aggressive therapies and cytotoxic treatments.  His side effects vs decompensation in his overall illness was so severe he was admitted to [facility] in march 2021.  Since that time, he has recovered some function with careful management of his cardiac disease, and less aggressive, less toxic cancer cares.  At this time, his treatment plan involves Xofigo for bone-avid disease, +/- Lupron.  We note that the patient has demonstrated some disease progression on other hormonal blockers, but that this plan of care is being offered, if not rec'd, by Dr. Toledo.    Pt presents to our office with suboptimally controlled pain, ongoing wt loss, poor appetite, and general malaise.  His concerns are more related to his back and R foot pain; a Podiatrist today advised him that his unilateral neuropathic symptoms 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.       Today, he can't quite recall his medications, he just insists that he needs 'something stronger than tynlenol'  When we pointed out that he has used tramadol and oxyIR recently, he also stated that these medications were not helpful.      Pt endorses support from his son, who will drive pt to appts and on other errands.   Pt can't name his meds, but has some vague understanding of the indications for his meds.\"    Past medical, surgical, social, and family histories are reviewed and pertinent updates are made.    Review of Systems   Unable to perform ROS: Dementia         Vital Signs    /64 (BP Location: Right arm, Patient Position: Sitting, Cuff Size: Standard)   Pulse 91   Temp (!) 96.9 °F (36.1 °C) (Temporal)   Resp 20   Ht 5' 7\" (1.702 m)   Wt 103 kg (226 lb 10.1 oz)   SpO2 96%   BMI 35.50 kg/m²     Physical Exam and Objective Data  Physical Exam  Constitutional:       General: He is not in acute distress.     Appearance: He is ill-appearing. He is not toxic-appearing or diaphoretic.      Comments: Frail   HENT: "      Head: Normocephalic and atraumatic.      Right Ear: External ear normal.      Left Ear: External ear normal.   Eyes:      General:         Right eye: No discharge.         Left eye: No discharge.      Conjunctiva/sclera: Conjunctivae normal.      Pupils: Pupils are equal, round, and reactive to light.   Neck:      Trachea: No tracheal deviation.   Cardiovascular:      Rate and Rhythm: Regular rhythm. Tachycardia present.   Pulmonary:      Effort: Pulmonary effort is normal. No respiratory distress.      Breath sounds: No stridor.   Abdominal:      General: There is no distension.      Palpations: Abdomen is soft.      Comments: overweight   Skin:     General: Skin is warm and dry.      Coloration: Skin is pale.      Findings: No erythema or rash.   Neurological:      General: No focal deficit present.      Mental Status: He is alert. Mental status is at baseline. He is disoriented.      Cranial Nerves: No cranial nerve deficit.       Radiology and Laboratory:  I personally reviewed and interpreted the following results: none new    I have spent a total time of 20+ minutes on 12/28/23 in caring for this patient including chart review; symptom pursuit; supportive listening; anticipatory guidance.

## 2023-12-28 NOTE — TELEPHONE ENCOUNTER
[facility] called they do not have oxyCodone available but their sister [facility] has in stock. Pharmacy will deliver to patient. Please cancel original script and put new one through to [facility].         Last appointment: 12/28/23    Next scheduled appointment: 1/29/24    Pharmacy: [facility]

## 2024-01-03 DIAGNOSIS — I50.42 CHRONIC COMBINED SYSTOLIC AND DIASTOLIC CHF (CONGESTIVE HEART FAILURE) (HCC): ICD-10-CM

## 2024-01-03 RX ORDER — FUROSEMIDE 20 MG/1
40 TABLET ORAL DAILY
Qty: 60 TABLET | Refills: 5 | Status: SHIPPED | OUTPATIENT
Start: 2024-01-03

## 2024-01-09 DIAGNOSIS — C79.51 MALIGNANT NEOPLASM METASTATIC TO BONE (HCC): ICD-10-CM

## 2024-01-09 DIAGNOSIS — I73.9 PAD (PERIPHERAL ARTERY DISEASE) (HCC): ICD-10-CM

## 2024-01-09 RX ORDER — GABAPENTIN 300 MG/1
CAPSULE ORAL
Qty: 120 CAPSULE | Refills: 0 | Status: SHIPPED | OUTPATIENT
Start: 2024-01-09

## 2024-01-11 RX ORDER — ABIRATERONE ACETATE 250 MG/1
TABLET ORAL
Qty: 120 TABLET | Refills: 10 | Status: SHIPPED | OUTPATIENT
Start: 2024-01-11

## 2024-01-17 DIAGNOSIS — I50.22 CHRONIC HFREF (HEART FAILURE WITH REDUCED EJECTION FRACTION) (HCC): ICD-10-CM

## 2024-01-17 RX ORDER — LOSARTAN POTASSIUM 25 MG/1
12.5 TABLET ORAL DAILY
Qty: 45 TABLET | Refills: 1 | Status: SHIPPED | OUTPATIENT
Start: 2024-01-17

## 2024-01-18 ENCOUNTER — APPOINTMENT (EMERGENCY)
Dept: CT IMAGING | Facility: HOSPITAL | Age: 79
End: 2024-01-18
Payer: OTHER GOVERNMENT

## 2024-01-18 ENCOUNTER — HOSPITAL ENCOUNTER (INPATIENT)
Facility: HOSPITAL | Age: 79
LOS: 1 days | Discharge: HOME | End: 2024-01-18
Attending: EMERGENCY MEDICINE | Admitting: INTERNAL MEDICINE
Payer: OTHER GOVERNMENT

## 2024-01-18 VITALS
TEMPERATURE: 98.2 F | SYSTOLIC BLOOD PRESSURE: 117 MMHG | DIASTOLIC BLOOD PRESSURE: 58 MMHG | RESPIRATION RATE: 20 BRPM | OXYGEN SATURATION: 96 % | HEART RATE: 90 BPM

## 2024-01-18 DIAGNOSIS — K59.00 CONSTIPATION: ICD-10-CM

## 2024-01-18 DIAGNOSIS — C61 PROSTATE CANCER METASTATIC TO BONE (HCC): ICD-10-CM

## 2024-01-18 DIAGNOSIS — R33.9 URINARY RETENTION: Primary | ICD-10-CM

## 2024-01-18 DIAGNOSIS — C79.51 PROSTATE CANCER METASTATIC TO BONE (HCC): ICD-10-CM

## 2024-01-18 PROBLEM — K62.89 PROCTITIS: Status: ACTIVE | Noted: 2024-01-18

## 2024-01-18 PROBLEM — R74.8 ELEVATED LIVER ENZYMES: Status: ACTIVE | Noted: 2024-01-18

## 2024-01-18 LAB
ALBUMIN SERPL BCP-MCNC: 3.5 G/DL (ref 3.5–5)
ALP SERPL-CCNC: 67 U/L (ref 34–104)
ALT SERPL W P-5'-P-CCNC: 104 U/L (ref 7–52)
ANION GAP SERPL CALCULATED.3IONS-SCNC: 10 MMOL/L
AST SERPL W P-5'-P-CCNC: 57 U/L (ref 13–39)
BASOPHILS # BLD AUTO: 0.03 THOUSANDS/ÂΜL (ref 0–0.1)
BASOPHILS NFR BLD AUTO: 1 % (ref 0–1)
BILIRUB SERPL-MCNC: 0.78 MG/DL (ref 0.2–1)
BILIRUB UR QL STRIP: NEGATIVE
BUN SERPL-MCNC: 11 MG/DL (ref 5–25)
CALCIUM SERPL-MCNC: 8.5 MG/DL (ref 8.4–10.2)
CHLORIDE SERPL-SCNC: 105 MMOL/L (ref 96–108)
CLARITY UR: CLEAR
CO2 SERPL-SCNC: 24 MMOL/L (ref 21–32)
COLOR UR: YELLOW
CREAT SERPL-MCNC: 0.76 MG/DL (ref 0.6–1.3)
EOSINOPHIL # BLD AUTO: 0.17 THOUSAND/ÂΜL (ref 0–0.61)
EOSINOPHIL NFR BLD AUTO: 3 % (ref 0–6)
ERYTHROCYTE [DISTWIDTH] IN BLOOD BY AUTOMATED COUNT: 13.2 % (ref 11.6–15.1)
GFR SERPL CREATININE-BSD FRML MDRD: 87 ML/MIN/1.73SQ M
GLUCOSE SERPL-MCNC: 111 MG/DL (ref 65–140)
GLUCOSE UR STRIP-MCNC: ABNORMAL MG/DL
HCT VFR BLD AUTO: 35.2 % (ref 36.5–49.3)
HGB BLD-MCNC: 11.3 G/DL (ref 12–17)
HGB UR QL STRIP.AUTO: NEGATIVE
IMM GRANULOCYTES # BLD AUTO: 0.03 THOUSAND/UL (ref 0–0.2)
IMM GRANULOCYTES NFR BLD AUTO: 1 % (ref 0–2)
KETONES UR STRIP-MCNC: NEGATIVE MG/DL
LEUKOCYTE ESTERASE UR QL STRIP: NEGATIVE
LIPASE SERPL-CCNC: 11 U/L (ref 11–82)
LYMPHOCYTES # BLD AUTO: 0.78 THOUSANDS/ÂΜL (ref 0.6–4.47)
LYMPHOCYTES NFR BLD AUTO: 14 % (ref 14–44)
MCH RBC QN AUTO: 30.5 PG (ref 26.8–34.3)
MCHC RBC AUTO-ENTMCNC: 32.1 G/DL (ref 31.4–37.4)
MCV RBC AUTO: 95 FL (ref 82–98)
MONOCYTES # BLD AUTO: 0.63 THOUSAND/ÂΜL (ref 0.17–1.22)
MONOCYTES NFR BLD AUTO: 11 % (ref 4–12)
NEUTROPHILS # BLD AUTO: 3.97 THOUSANDS/ÂΜL (ref 1.85–7.62)
NEUTS SEG NFR BLD AUTO: 70 % (ref 43–75)
NITRITE UR QL STRIP: NEGATIVE
NRBC BLD AUTO-RTO: 0 /100 WBCS
PH UR STRIP.AUTO: 5.5 [PH]
PLATELET # BLD AUTO: 190 THOUSANDS/UL (ref 149–390)
PMV BLD AUTO: 10 FL (ref 8.9–12.7)
POTASSIUM SERPL-SCNC: 3.6 MMOL/L (ref 3.5–5.3)
PROT SERPL-MCNC: 6 G/DL (ref 6.4–8.4)
PROT UR STRIP-MCNC: NEGATIVE MG/DL
RBC # BLD AUTO: 3.7 MILLION/UL (ref 3.88–5.62)
SODIUM SERPL-SCNC: 139 MMOL/L (ref 135–147)
SP GR UR STRIP.AUTO: 1.02 (ref 1–1.03)
UROBILINOGEN UR QL STRIP.AUTO: 0.2 E.U./DL
WBC # BLD AUTO: 5.61 THOUSAND/UL (ref 4.31–10.16)

## 2024-01-18 PROCEDURE — 83690 ASSAY OF LIPASE: CPT | Performed by: EMERGENCY MEDICINE

## 2024-01-18 PROCEDURE — 97166 OT EVAL MOD COMPLEX 45 MIN: CPT

## 2024-01-18 PROCEDURE — 81003 URINALYSIS AUTO W/O SCOPE: CPT | Performed by: EMERGENCY MEDICINE

## 2024-01-18 PROCEDURE — 99243 OFF/OP CNSLTJ NEW/EST LOW 30: CPT | Performed by: PHYSICIAN ASSISTANT

## 2024-01-18 PROCEDURE — 80053 COMPREHEN METABOLIC PANEL: CPT | Performed by: EMERGENCY MEDICINE

## 2024-01-18 PROCEDURE — 97116 GAIT TRAINING THERAPY: CPT

## 2024-01-18 PROCEDURE — 36415 COLL VENOUS BLD VENIPUNCTURE: CPT | Performed by: EMERGENCY MEDICINE

## 2024-01-18 PROCEDURE — 97163 PT EVAL HIGH COMPLEX 45 MIN: CPT

## 2024-01-18 PROCEDURE — 85025 COMPLETE CBC W/AUTO DIFF WBC: CPT | Performed by: EMERGENCY MEDICINE

## 2024-01-18 PROCEDURE — 99285 EMERGENCY DEPT VISIT HI MDM: CPT

## 2024-01-18 PROCEDURE — 74177 CT ABD & PELVIS W/CONTRAST: CPT

## 2024-01-18 PROCEDURE — G1004 CDSM NDSC: HCPCS

## 2024-01-18 PROCEDURE — 99285 EMERGENCY DEPT VISIT HI MDM: CPT | Performed by: EMERGENCY MEDICINE

## 2024-01-18 PROCEDURE — 76775 US EXAM ABDO BACK WALL LIM: CPT | Performed by: EMERGENCY MEDICINE

## 2024-01-18 RX ORDER — METOPROLOL SUCCINATE 25 MG/1
12.5 TABLET, EXTENDED RELEASE ORAL DAILY
Status: CANCELLED | OUTPATIENT
Start: 2024-01-18

## 2024-01-18 RX ORDER — DOCUSATE SODIUM 100 MG/1
100 CAPSULE, LIQUID FILLED ORAL EVERY 12 HOURS
Qty: 60 CAPSULE | Refills: 0 | Status: SHIPPED | OUTPATIENT
Start: 2024-01-18 | End: 2024-02-08

## 2024-01-18 RX ORDER — LOSARTAN POTASSIUM 25 MG/1
12.5 TABLET ORAL DAILY
Status: CANCELLED | OUTPATIENT
Start: 2024-01-18

## 2024-01-18 RX ORDER — METHOCARBAMOL 500 MG/1
500 TABLET, FILM COATED ORAL 3 TIMES DAILY
Status: CANCELLED | OUTPATIENT
Start: 2024-01-18

## 2024-01-18 RX ORDER — PREDNISONE 10 MG/1
5 TABLET ORAL 2 TIMES DAILY WITH MEALS
Status: CANCELLED | OUTPATIENT
Start: 2024-01-18

## 2024-01-18 RX ORDER — FUROSEMIDE 40 MG/1
40 TABLET ORAL DAILY
Status: CANCELLED | OUTPATIENT
Start: 2024-01-18

## 2024-01-18 RX ORDER — POLYETHYLENE GLYCOL 3350 17 G/17G
17 POWDER, FOR SOLUTION ORAL DAILY
Qty: 510 G | Refills: 0 | Status: SHIPPED | OUTPATIENT
Start: 2024-01-18

## 2024-01-18 RX ORDER — OXYCODONE HYDROCHLORIDE 5 MG/1
5 TABLET ORAL ONCE
Status: COMPLETED | OUTPATIENT
Start: 2024-01-18 | End: 2024-01-18

## 2024-01-18 RX ORDER — ENOXAPARIN SODIUM 100 MG/ML
40 INJECTION SUBCUTANEOUS DAILY
Status: CANCELLED | OUTPATIENT
Start: 2024-01-18

## 2024-01-18 RX ORDER — ATORVASTATIN CALCIUM 40 MG/1
80 TABLET, FILM COATED ORAL
Status: CANCELLED | OUTPATIENT
Start: 2024-01-18

## 2024-01-18 RX ORDER — POTASSIUM CHLORIDE 20 MEQ/1
20 TABLET, EXTENDED RELEASE ORAL DAILY
Status: CANCELLED | OUTPATIENT
Start: 2024-01-18

## 2024-01-18 RX ORDER — GABAPENTIN 300 MG/1
600 CAPSULE ORAL 2 TIMES DAILY
Status: CANCELLED | OUTPATIENT
Start: 2024-01-18

## 2024-01-18 RX ORDER — OXYCODONE HYDROCHLORIDE 5 MG/1
5 TABLET ORAL EVERY 6 HOURS PRN
Status: CANCELLED | OUTPATIENT
Start: 2024-01-18

## 2024-01-18 RX ADMIN — OXYCODONE HYDROCHLORIDE 5 MG: 5 TABLET ORAL at 16:34

## 2024-01-18 RX ADMIN — IOHEXOL 100 ML: 350 INJECTION, SOLUTION INTRAVENOUS at 07:55

## 2024-01-18 NOTE — ASSESSMENT & PLAN NOTE
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    Patient has an extremely large BM while in the ED.  Maintain an aggressive bowel regimen.

## 2024-01-18 NOTE — OCCUPATIONAL THERAPY NOTE
Occupational Therapy Evaluation     Patient Name: Royce Rene  Today's Date: 1/18/2024  Problem List  Active Problems:  There are no active Hospital Problems.    Past Medical History  Past Medical History:   Diagnosis Date    Cancer ([facility]) 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    Coronary artery disease 2001    S/p stenting to circumflex and RCA    HFrEF (heart failure with reduced ejection fraction) ([facility]) 06/14/2021    High cholesterol     Prostate cancer ([facility])      Past Surgical History  Past Surgical History:   Procedure Laterality Date    CARDIAC ELECTROPHYSIOLOGY PROCEDURE N/A 12/23/2021    Procedure: Implant ICD - bi ventricular;  Surgeon: Jonas Oviedo MD;  Location: [facility] C[facility];  Service: Cardiology    CARDIAC SURGERY      IR LOWER EXTREMITY ANGIOGRAM  4/18/2023    MN TEAEC W/WO PATCH GRAFT COMMON FEMORAL Right 7/9/2021    Procedure: ENDARTERECTOMY ARTERIAL FEMORAL;  Surgeon: Serina Cook DO;  Location: [facility];  Service: Vascular         01/18/24 1442   OT Last Visit   OT Visit Date 01/18/24   Note Type   Note type Evaluation   Pain Assessment   Pain Assessment Tool 0-10   Pain Score 4   Pain Location/Orientation   (penis)   Pain Onset/Description Frequency: Intermittent   Restrictions/Precautions   Other Precautions Fall Risk;Visual impairment   Home Living   Type of Home Apartment   Home Layout One level  (2nd floor apt. Has stair glide for 1st several steps then 4 step to manage with railing.)   Bathroom Shower/Tub Tub/shower unit   Bathroom Toilet Standard   Bathroom Equipment Grab bars in shower   Bathroom Accessibility Accessible   Home Equipment Walker;Sock aid;Long-handled shoehorn; Rollator -for outside use  (Reports that his electric scooter is not working. Currently uses an office chair that he sits and pedals around with BLE around home.)   Prior Function   Level of [place] Independent with ADLs;Independent with functional mobility;Needs assistance with IADLS   Lives With Son  (Pt  lives on 2nd floor. Son lives in attic.)   IADLs Family/Friend/Other provides transportation  (Pt relies on Meals on wheels 5 days/week. Pt reports that he is able to cook his own meals on weekends.)   Falls in the last 6 months 0   Vocational Retired   Subjective   Subjective Pt received in supine position. Pt agreeable to session once educated on OT/PT role   ADL   Eating Assistance 7  Independent   Grooming Assistance 7  Independent   UB Bathing Assistance 5  Supervision/Setup   LB Bathing Assistance 4  Minimal Assistance   UB Dressing Assistance 5  Supervision/Setup   LB Dressing Assistance 3  Moderate Assistance   LB Dressing Deficit Don/doff R shoe;Don/doff L shoe  (Pt typically relies on long hand led AD to perform LB dressing.)   Toileting Assistance  4  Minimal Assistance   Bed Mobility   Supine to Sit 5  Supervision   Sit to Supine 5  Supervision   Transfers   Sit to Stand 5  Supervision   Stand to Sit 5  Supervision   Stand pivot 5  Supervision   Functional Mobility   Functional Mobility 5  Supervision   Additional Comments RW   Balance   Static Sitting Good   Dynamic Sitting Fair +   Static Standing Fair   Dynamic Standing Fair -   Activity Tolerance   Activity Tolerance Patient tolerated treatment well   Medical Staff Made Aware PT Dr Andrew Donovan Assessment   RUE Assessment WFL   LUE Assessment   LUE Assessment WFL   Vision-Basic Assessment   Current Vision Wears glasses all the time   Patient Visual Report   (R eye blindness)      Cognition   Arousal/Participation Alert   Attention Within functional limits   Orientation Level Oriented to person;Oriented to place;Oriented to situation   Memory   (Pt forgetful. Reports requiring re-education on barber care.)   Following Commands Follows multistep commands with increased time or repetition   Comments Pt scored 4.4 on ACLS in April 2023, indicating that there is benefit of daily check-ins, however may be alone for parts of day.   Assessment  "  Assessment Pt is a 78 y.o. male seen for OT evaluation at [facility], admitted 1/18/2024 w/ c/p constipation and urinary retention. Pt s/p barber placement.  Comorbidities affecting pt's functional performance at time of assessment include: CAD, HLD, and treatment resistant prostate cancer with bony metastases (dx 2003, s/p external beam radiation 2003, bony metastases diagnosed 2008, currently undergoing treatment with Dr. Hernandez).  Prior to admission, pt was living with CAD, HLD, and treatment resistant prostate cancer with bony metastases.  Pt was I w/  ADLS and required some assist with IADLS, & required wheelchair  PTA. Upon evaluation, pt appears to be at functional baseline. Pt does endorse concern re: barber mgmt. States \"I need someone to show me again. RN was made aware.  Full objective findings from OT assessment regarding body systems outlined above. Personal factors affecting pt at time of IE include:steps to enter environment and limited home support. No further OT needs indicated at this time.  This evaluation required an expanded review of medical and/or therapy records and additional review of physical, cognitive and psychosocial history related to functional performance. Based upon functional performance deficits and assessments, this evaluation has been identified as a  mod complexity evaluation.  At this time, OT recommendations at time of discharge are home with no OT needs. Pt denies any need for therapy services at this time.   Goals   Patient Goals Pt wishes to have more barber teaching.   Plan   OT Frequency Eval only   Discharge Recommendation   Rehab Resource Intensity Level, OT No post-acute rehabilitation needs  (Pt appears to be at functional baseline. Pt denies any therapy needs at this time.)   Additional Comments  The patient's raw score on the AM-PAC Daily Activity inpatient short form is 21, standardized score is 44.27, greater than 39.4. Patients at this level are " likely to benefit from DC to home. However please refer to therapist recommendation for discharge planning given other factors that may influence destination.   AM-PAC Daily Activity Inpatient   Lower Body Dressing 3   Bathing 3   Toileting 3   Upper Body Dressing 4   Grooming 4   Eating 4   Daily Activity Raw Score 21   Daily Activity Standardized Score (Calc for Raw Score >=11) 44.27   AM-PAC Applied Cognition Inpatient   Following a Speech/Presentation 3   Understanding Ordinary Conversation 4   Taking Medications 3   Remembering Where Things Are Placed or Put Away 4   Remembering List of 4-5 Errands 3   Taking Care of Complicated Tasks 3   Applied Cognition Raw Score 20   Applied Cognition Standardized Score 41.76   End of Consult   Education Provided Yes   Patient Position at End of Consult Bedside chair;Bed/Chair alarm activated;All needs within reach   Nurse Communication Nurse aware of consult         Evelia Davis OTR/BUFFY

## 2024-01-18 NOTE — CONSULTS
[facility]  Consult  Name: Royce Rene 78 y.o. male I MRN: [id number]  Unit/Bed#: ED 12 I Date of Admission: 1/18/2024   Date of Service: 1/18/2024 I Hospital Day: 0    Consults    Assessment/Plan   * Urinary retention  Assessment & Plan  Patient presented with urinary retention requiring barber catheter placement in the ER.    Recommend continuation of catheter for now and attempt voiding trial prior to discharge.  CT demonstrates inflammatory-type changes around the bladder, which may suggest cystitis however UA is negative.    Proctitis  Assessment & Plan  CT demonstrates evidence of proctitis, likely stercoral as patient reports having constipation prior to admission resulting in urinary retention and abdominal pain.    Patient has an extremely large BM while in the ED.  Maintain an aggressive bowel regimen.    Elevated liver enzymes  Assessment & Plan  AST/ALT slightly elevated @ 57/104 respectively.  CT demonstrates hepatic steatosis but otherwise normal morphology.  May be secondary to treatment with [id number].  Should be monitored routinely.    Class 2 severe obesity due to excess calories with serious comorbidity and body mass index (BMI) of 36.0 to 36.9 in adult   Assessment & Plan  BMI:  35.50  Encourage weight loss and lifestyle modifications.    Prostate cancer (HCC)  Assessment & Plan  Patient with a history of metastatic prostate cancer, [facility] and [facility] with Dr. Hernandez.    S/p radiation and chemotherapy  Also follows with palliative care for cancer-related pain  CT lumbar spine (recon) demonstrates increased blastic prostate metastases compared to a study done in 2022.  No acute pathologic fractures noted.    Chronic combined systolic and diastolic CHF (congestive heart failure) (HCC)  Assessment & Plan  Wt Readings from Last 3 Encounters:   12/28/23 103 kg (226 lb 10.1 oz)   12/13/23 106 kg (234 lb)   12/12/23 107 kg (235 lb)     Most recent ECHO  (8/2/23) demonstrated EF 30-35% with grade 1 diastolic dysfunction.  No evidence of acute heart failure at present.  Continue Lasix 20 mg BID and metoprolol.      VTE Prophylaxis: VTE Score: 6  None ordered while in the ED    Mobility:   Basic Mobility Inpatient Raw Score: 20  JH-HLM Goal: 6: Walk 10 steps or more  JH-HLM Achieved: 7: Walk 25 feet or more    Recommendations for Discharge:  Safe discharge plan    Total Time Spent on Date of Encounter in care of patient: 45 mins. This time was spent on one or more of the following: performing physical exam; counseling and coordination of care; obtaining or reviewing history; documenting in the medical record; reviewing/ordering tests, medications or procedures; communicating with other healthcare professionals and discussing with patient's family/caregivers.    Collaboration of Care: Were Recommendations Directly Discussed with Primary Treatment Team? Yes    History of Present Illness:  Royce Rene is a 78 y.o. male who was seen in the Emergency Department for abdominal pain, urinary retention and constipation.  While in the ED, the patient had difficulty ambulating until he was able to have a large BM and his bladder was drained.  Afterward, he was able to be safely discharged once his family could be reached.  There were no acute medical needs warranting admission.    Review of Systems:  Review of Systems   Constitutional:  Negative for chills and fever.   HENT:  Negative for ear pain and sore throat.    Eyes:  Negative for pain and visual disturbance.   Respiratory:  Negative for cough and shortness of breath.    Cardiovascular:  Negative for chest pain and palpitations.   Gastrointestinal:  Positive for constipation. Negative for abdominal pain and vomiting.   Genitourinary:  Positive for difficulty urinating. Negative for dysuria and hematuria.   Musculoskeletal:  Negative for arthralgias and back pain.   Skin:  Negative for color change and rash.   Neurological:   Negative for seizures and syncope.   All other systems reviewed and are negative.    Past Medical and Surgical History:   Past Medical History:   Diagnosis Date    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    Coronary artery disease 2001    S/p stenting to circumflex and RCA    HFrEF (heart failure with reduced ejection fraction) ([facility]) 06/14/2021    High cholesterol     Prostate cancer ([facility])        Past Surgical History:   Procedure Laterality Date    CARDIAC ELECTROPHYSIOLOGY PROCEDURE N/A 12/23/2021    Procedure: Implant ICD - bi ventricular;  Surgeon: Jonas Oviedo MD;  [facility]: [facility] C[facility];  Service: Cardiology    CARDIAC SURGERY      IR LOWER EXTREMITY ANGIOGRAM  4/18/2023    [place] TEAEC W/WO PATCH GRAFT COMMON FEMORAL Right 7/9/2021    Procedure: ENDARTERECTOMY ARTERIAL FEMORAL;  Surgeon: Serina Cook DO;  Location: [facility];  Service: Vascular       Meds/Allergies:  PTA meds:   Prior to Admission Medications   Prescriptions Last Dose Informant Patient Reported? Taking?   Blood Glucose Monitoring Suppl (Accu-Chek Guide Me) w/Device KIT   No No   Sig: USE AS DIRECTED   Diclofenac Sodium (VOLTAREN) 1 %   No No   Sig: APPLY 2 G TOPICALLY 4 (FOUR) TIMES A DAY FOR PAIN TO AFFECTED AREA   Jardiance 10 MG TABS tablet   No No   Sig: TAKE 1 TABLET (10 MG TOTAL) BY MOUTH EVERY MORNING   Lancet Devices (Lancing Device) MISC   No No   Sig: TID   abiraterone (ZYTIGA) 250 mg tablet   No No   Sig: Take 4 tablets (1,000 mg total) by mouth once daily   acetaminophen (TYLENOL) 500 mg tablet  Self No No   Sig: Take 2 tablets (1,000 mg total) by mouth 3 (three) times a day as needed for mild pain   aspirin 81 mg chewable tablet  Self No No   Sig: Chew 1 tablet (81 mg total) daily   atorvastatin (LIPITOR) 80 mg tablet  Self No No   Sig: Take 1 tablet (80 mg total) by mouth daily with dinner   capsicum (ZOSTRIX) 0.075 % topical cream   No No   Sig: Apply topically 3 (three) times a day for 7 days Apply on the feet and  avoid areas of wounds or scabs   cetirizine (ZyrTEC) 5 MG tablet  Self No No   Sig: Take 1 tablet (5 mg total) by mouth daily   clopidogrel (PLAVIX) 75 mg tablet  Self No No   Sig: Take 1 tablet (75 mg total) by mouth daily   famotidine (PEPCID) 20 mg tablet  Self No No   Sig: Take 1 tablet (20 mg total) by mouth daily   ferrous sulfate 325 (65 Fe) mg tablet  Self No No   Sig: Take 1 tablet (325 mg total) by mouth 2 (two) times a day with meals   furosemide (LASIX) 20 mg tablet   No No   Sig: Take 2 tablets (40 mg total) by mouth daily   gabapentin (NEURONTIN) 300 mg capsule   No No   Sig: TAKE 2 CAPSULES (600 MG TOTAL) BY MOUTH 2 (TWO) TIMES A DAY TO REDUCE NERVE PAIN IN FEET.   loperamide (IMODIUM) 2 mg capsule  Self No No   Sig: Take 1 capsule (2 mg total) by mouth 4 (four) times a day as needed for diarrhea   losartan (COZAAR) 25 mg tablet   No No   Sig: TAKE 0.5 TABLETS (12.5 MG TOTAL) BY MOUTH DAILY   methocarbamol (ROBAXIN) 500 mg tablet   No No   Sig: Take 1 tablet (500 mg total) by mouth 3 (three) times a day   metoprolol succinate (TOPROL-XL) 25 mg 24 hr tablet   No No   Sig: TAKE HALF TABLET DAILY   oxyCODONE (ROXICODONE) 5 immediate release tablet   No No   Sig: Take 1 tablet (5 mg total) by mouth every 6 (six) hours as needed (cancer pain) Max Daily Amount: 20 mg   potassium chloride (K-DUR,KLOR-CON) 20 mEq tablet   No No   Sig: TAKE 1 TABLET (20 MEQ TOTAL) BY MOUTH DAILY   predniSONE 5 mg tablet   No No   Sig: TAKE 1 TABLET (5 MG TOTAL) BY MOUTH 2 (TWO) TIMES A DAY WITH MEALS      Facility-Administered Medications: None       Allergies: No Known Allergies    Social History:  Marital Status:   Substance Use History:   Social History     Substance and Sexual Activity   Alcohol Use Not Currently     Social History     Tobacco Use   Smoking Status Former    Current packs/day: 0.00    Types: Cigarettes    Start date: 1962    Quit date: 2002    Years since quittin.5    Passive exposure:  Past   Smokeless Tobacco Never     Social History     Substance and Sexual Activity   Drug Use Not Currently       Family History:  Family History   Problem Relation Age of Onset    Cancer Mother        Physical Exam:   Vitals:   Blood Pressure: 138/60 (01/18/24 1330)  Pulse: 96 (01/18/24 1330)  Temperature: 98.2 °F (36.8 °C) (01/18/24 0614)  Temp Source: Oral (01/18/24 0614)  Respirations: 16 (01/18/24 1330)  SpO2: 97 % (01/18/24 1330)    Physical Exam  Constitutional:       General: He is not in acute distress.     Appearance: Normal appearance. He is obese.   HENT:      Head: Normocephalic and atraumatic.      Mouth/Throat:      Mouth: Mucous membranes are moist.   Eyes:      General: No scleral icterus.     Extraocular Movements: Extraocular movements intact.   Cardiovascular:      Rate and Rhythm: Normal rate and regular rhythm.      Heart sounds: No murmur heard.  Pulmonary:      Effort: Pulmonary effort is normal. No respiratory distress.      Breath sounds: Normal breath sounds. No wheezing, rhonchi or rales.   Abdominal:      General: Bowel sounds are normal.      Palpations: Abdomen is soft.      Tenderness: There is no abdominal tenderness.   Musculoskeletal:         General: Normal range of motion.      Cervical back: Normal range of motion.   Skin:     General: Skin is warm.      Findings: No rash.   Neurological:      General: No focal deficit present.      Mental Status: He is alert and oriented to person, place, and time.   Psychiatric:         Mood and Affect: Mood normal.         Behavior: Behavior normal.        Additional Data:   Lab Results:    Results from last 7 days   Lab Units 01/18/24  0652   WBC Thousand/uL 5.61   HEMOGLOBIN g/dL 11.3*   HEMATOCRIT % 35.2*   PLATELETS Thousands/uL 190   NEUTROS PCT % 70   LYMPHS PCT % 14   MONOS PCT % 11   EOS PCT % 3     Results from last 7 days   Lab Units 01/18/24  0652   SODIUM mmol/L 139   POTASSIUM mmol/L 3.6   CHLORIDE mmol/L 105   CO2 mmol/L 24  "  BUN mg/dL 11   CREATININE mg/dL 0.76   ANION GAP mmol/L 10   CALCIUM mg/dL 8.5   ALBUMIN g/dL 3.5   TOTAL BILIRUBIN mg/dL 0.78   ALK PHOS U/L 67   ALT U/L 104*   AST U/L 57*   GLUCOSE RANDOM mg/dL 111             No results found for: \"HGBA1C\"            Imaging: Reviewed radiology reports from this admission including: abdominal/pelvic CT  CT recon only lumbar spine   Final Result by Tracey Allen MD (01/18 0826)      Increased blastic prostate metastases compared to 2022.      No acute pathologic fracture or new secondary stenosis identified.      No evidence of high-grade degenerative stenosis.      Workstation performed: [id number]         CT abdomen pelvis with contrast   Final Result by Tracey Allen MD (01/18 0826)      Findings of proctitis. Likely infectious/inflammatory or stercoral.      Inflammatory-type changes around the bladder. Limited evaluation of the bladder due to Moser catheter. Correlate clinically for cystitis.      Workstation performed: [id number]             EKG, Pathology, and Other Studies Reviewed on Admission:   EKG: No EKG obtained.    ** Please Note: This note may have been constructed using a voice recognition system. **    "

## 2024-01-18 NOTE — ED NOTES
Patient is resting comfortably., asked if patient desired water or food, patient declined both, call bell within reach. No needs expressed a this time      Sabrina Keenan RN  01/18/24 5445

## 2024-01-18 NOTE — DISCHARGE INSTRUCTIONS
You need to make an appointment with [facility]'s urology in approximately 1 week to have what is called a trial of void where they take out your catheter and see if you are able to pee without it.  I have prescribed to laxative medications to help your constipation.  1 is called MiraLAX.  It is a powder and you take 1 capful of the powder and put it in 8 ounces of liquid and drink it once daily.  The other medicine is a pill called Colace which you take twice a day.

## 2024-01-18 NOTE — ASSESSMENT & PLAN NOTE
Patient with a history of metastatic prostate cancer, [facility] and [facility] with Dr. Hernandez.    S/p radiation and chemotherapy  Also follows with palliative care for cancer-related pain  CT lumbar spine (recon) demonstrates increased blastic prostate metastases compared to a study done in 2022.  No acute pathologic fractures noted.

## 2024-01-18 NOTE — PHYSICAL THERAPY NOTE
PHYSICAL THERAPY Evaluation and Treatment  DATE: 01/18/24  TIME: 8397-8919    NAME:  Royce Rene  AGE:   78 y.o.  Mrn:   [id number]  Length Of Stay: 0    ADMIT DX:  Constipation [K59.00]    Past Medical History:   Diagnosis Date    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    Coronary artery disease 2001    S/p stenting to circumflex and RCA    HFrEF (heart failure with reduced ejection fraction) ([facility]) 06/14/2021    High cholesterol     Prostate cancer (HCC)      Past Surgical History:   Procedure Laterality Date    CARDIAC ELECTROPHYSIOLOGY PROCEDURE N/A 12/23/2021    Procedure: Implant ICD - bi ventricular;  Surgeon: Jonas Oviedo MD;  [facility];  Service: Cardiology    CARDIAC SURGERY      IR LOWER EXTREMITY ANGIOGRAM  4/18/2023    [place] TEAEC W/WO PATCH GRAFT COMMON FEMORAL Right 7/9/2021    Procedure: ENDARTERECTOMY ARTERIAL FEMORAL;  Surgeon: Serina Cook DO;  Location: [facility];  Service: Vascular        01/18/24 1419   PT Last Visit   PT Visit Date 01/18/24   Note Type   Note type Evaluation   Pain Assessment   Pain Assessment Tool 0-10   Pain Score 4  (progressed to 6/10)   Pain Location/Orientation Other (Comment)  (initially penial pain.  following walking, c/o anahi buttock/back pain)   Hospital Pain Intervention(s) Rest   Restrictions/Precautions   Weight Bearing Precautions Per Order No   Other Precautions Cognitive;Fall Risk;Pain;Visual impairment   Home Living   Additional Comments Pt lives alone in 2nd floor apartment (within a single family home), son lives in separate apartment.  Stair lift to access second floor, than 4 steps to enter living area, with rails.  Shower/tub with bars. Standard toilet with chair.  DME: walker, cane, electric scooter (inoperable at this time).   Prior Function   Comments Prior to admission: pt reports he mostly mobilizes around his apartment while sitting in a desk chair, but able to ambulate short distances.  Rarely leaves the apartment, but uses a  "rollator to ambulate from house to car, or at appointments.   Meals on wheels 2x/day, but able to cook simple meals on weekends. Pt reports he manages modified ADLs.  Drive (-).   General   Additional Pertinent History 79 y/o presents with c/o constipation, urine retention, abdominal/penis pain.  Currently receiving radiation tx for prostate/bone cancer. Family is reporting that they are unable to care for the pt.   Family/Caregiver Present No   Cognition   Overall Cognitive Status Impaired   Arousal/Participation Alert   Orientation Level Oriented to person;Oriented to place;Oriented to situation   Subjective   Subjective Pt states, \"I don't know that I'll be able to do much, I don't feel well\".  When offered therapy services at home, pt states, \"I don't want that.  I'll be fine when I get home, I just need a couple of days to rest\".   RUE Assessment   RUE Assessment WFL   LUE Assessment   LUE Assessment WFL   RLE Assessment   RLE Assessment WFL   LLE Assessment   LLE Assessment WFL   Coordination   Movements are Fluid and Coordinated 1   Sensation   (Diminished sensation reported RLE, with worsening symptoms distally of knee)   Bed Mobility   Supine to Sit 5  Supervision   Sit to Supine 5  Supervision   Transfers   Sit to Stand 5  Supervision   Additional items   (performs without use of UEs)   Stand to Sit 5  Supervision   Ambulation/Elevation   Gait Assistance 5  Supervision   Additional items Verbal cues   Assistive Device Rolling walker   Distance 120'   Ambulation/Elevation Additional Comments Pt ambulates with RW, using appropriate pace and reciprocal gait.  Pt exhibits flexed posturing and advances walker forward beyond proper distance.  Pt makes brief corrections with posture and walker, before deviating again.   Balance   Static Sitting Good   Dynamic Sitting Fair +   Static Standing Fair   Dynamic Standing Fair -   Ambulatory Fair -  (RW)   Activity Tolerance   Activity Tolerance Patient limited by pain " "  Medical Staff Made Aware collaborated with OT, nursing, physican   Assessment   Prognosis Poor   Problem List Decreased endurance;Decreased mobility;Decreased cognition;Impaired judgement;Decreased safety awareness;Obesity;Pain   Assessment Orders for PT eval and treat received. Pt's PMHx: chronic LBP, CAD with stent, Cx, dementia.  Pt exhibits physical deficits noted in problem list above.  Deficits listed contribute to functional limitations that are significant from the patient PLOF and include: tolerance for OOB functional activities, unsafe/inefficient ambulation, fall risk, and unable to safely manage stairs/steps/ramp  During today's session, formal PT evaluation performed.  Pt managed bed mobility, transfers, and exhibited appropriate standing balance with/without RW.  Pt ambulated community distance, but expressed fatigue and worsening back/buttock pain with duration.  Pt expresses that he never ambulates community distances, and uses rollator to take seated rest breaks when needed.  Despite advising pt on gait deviations, endurance deficit, and potential fall risk; the pt denies need or desire to have ongoing skilled therapy services.      The AM-PAC & Barthel Index outcome tools were used to assist in determining pt safety w/ mobility/self care & appropriate d/c recommendations, see above for scores. Patient's clinical presentation is stable  due to ongoing medical management needs and complicated social/support system.     Pt seems to be physically functioning at, or close to his baseline for transfers, mobility, and ADLs.  Pt denies any concerns about functioning at home despite \"feeling ill\", but does express concern about managing barber catheter.  Nursing and physician notified of his concerns.  If pt's preferences change, he would be appropriate to receive HH PT intervention to address standing/walking endurance and gait/fall risk corrections.   Barriers to Discharge Decreased caregiver " support;Inaccessible home environment   Plan   PT Frequency   (Evaluation and Discharge (per patient request))   Discharge Recommendation   Rehab Resource Intensity Level, PT No post-acute rehabilitation needs   AM-PAC Basic Mobility Inpatient   Turning in Flat Bed Without Bedrails 4   Lying on Back to Sitting on Edge of Flat Bed Without Bedrails 4   Moving Bed to Chair 3   Standing Up From Chair Using Arms 3   Walk in Room 3   Climb 3-5 Stairs With Railing 3   Basic Mobility Inpatient Raw Score 20   Basic Mobility Standardized Score 43.99   Highest Level Of Mobility   JH-HLM Goal 6: Walk 10 steps or more   JH-HLM Achieved 7: Walk 25 feet or more   Barthel Index   Feeding 10   Bathing 0   Grooming Score 0   Dressing Score 5   Bladder Score 0   Bowels Score 10   Toilet Use Score 5   Transfers (Bed/Chair) Score 10   Mobility (Level Surface) Score 0   Stairs Score 5   Barthel Index Score 45   Additional Treatment Session   Start Time 1436   End Time 1444   Treatment Assessment Pt educated and instructed on proper postural and walker management for stability.  Educated and instructed pt on fall risk prevention and gait training.  Limited carry over due to disinterest and c/o worsening pain with ambulation.   End of Consult   Patient Position at End of Consult Supine;All needs within reach   The patient's AM-PAC Basic Mobility Inpatient Short Form Raw Score is 20. A Raw score of greater than or equal to 16 suggests the patient may benefit from discharge to home. Please also refer to the recommendation of the Physical Therapist for safe discharge planning.    Nish Lind, PT, DPT  [place] Licensure #[id number]

## 2024-01-18 NOTE — ED NOTES
Pt was able to stand and pivot to BS independently of staff. Pt did need assistance wiping after BM, needed assistance pulling up pants, pt also needed assistance putting his legs in bed. VSS, call bell in reach. Pt denies having any needs. Pt ate about 50% of his lunch.      Rosemarie Correa RN  01/18/24 [id number]

## 2024-01-18 NOTE — ASSESSMENT & PLAN NOTE
Wt Readings from Last 3 Encounters:   12/28/23 103 kg (226 lb 10.1 oz)   12/13/23 106 kg (234 lb)   12/12/23 107 kg (235 lb)     Most recent ECHO (8/2/23) demonstrated EF 30-35% with grade 1 diastolic dysfunction.  No evidence of acute heart failure at present.  Continue Lasix 20 mg BID and metoprolol.

## 2024-01-18 NOTE — ED PROVIDER NOTES
History  Chief Complaint   Patient presents with    Constipation     Pt arrived EMS w/ C/O constipation since yesterday with no relief and pain in his penis. Currently undergoing radiation therapy.      77 y/o male with hx of CAD, HLD, and treatment resistant prostate cancer with bony metastases (dx 2003, s/p external beam radiation 2003, bony metastases diagnosed 2008, currently undergoing treatment with Dr. Hernandez) presents to the ED via EMS from home for evaluation of constipation x 1 week and urinary retention since yesterday.  The patient states that he has had intermittent constipation for several months, which he attributes to his prostate cancer.  He typically has a bowel movement every 5-7 days and states that his last bowel movement was a week ago and he has had no relief with over-the-counter treatments.  Since last night he has noticed new urinary retention, which he has never had in the past.  He has chronic lower back pain from his bony metastases but denies any change in the pain.  He currently reports suprapubic fullness and discomfort.  He denies any focal weakness, numbness, saddle anesthesia, upper abdominal pain, nausea, vomiting, diarrhea, dysuria, hematuria, or flank pain.        Prior to Admission Medications   Prescriptions Last Dose Informant Patient Reported? Taking?   Blood Glucose Monitoring Suppl (Accu-Chek Guide Me) w/Device KIT   No No   Sig: USE AS DIRECTED   Diclofenac Sodium (VOLTAREN) 1 %   No No   Sig: APPLY 2 G TOPICALLY 4 (FOUR) TIMES A DAY FOR PAIN TO AFFECTED AREA   Jardiance 10 MG TABS tablet   No No   Sig: TAKE 1 TABLET (10 MG TOTAL) BY MOUTH EVERY MORNING   Lancet Devices (Lancing Device) MISC   No No   Sig: TID   abiraterone (ZYTIGA) 250 mg tablet   No No   Sig: Take 4 tablets (1,000 mg total) by mouth once daily   acetaminophen (TYLENOL) 500 mg tablet  Self No No   Sig: Take 2 tablets (1,000 mg total) by mouth 3 (three) times a day as needed for mild pain   aspirin 81 mg  chewable tablet  Self No No   Sig: Chew 1 tablet (81 mg total) daily   atorvastatin (LIPITOR) 80 mg tablet  Self No No   Sig: Take 1 tablet (80 mg total) by mouth daily with dinner   capsicum (ZOSTRIX) 0.075 % topical cream   No No   Sig: Apply topically 3 (three) times a day for 7 days Apply on the feet and avoid areas of wounds or scabs   cetirizine (ZyrTEC) 5 MG tablet  Self No No   Sig: Take 1 tablet (5 mg total) by mouth daily   clopidogrel (PLAVIX) 75 mg tablet  Self No No   Sig: Take 1 tablet (75 mg total) by mouth daily   famotidine (PEPCID) 20 mg tablet  Self No No   Sig: Take 1 tablet (20 mg total) by mouth daily   ferrous sulfate 325 (65 Fe) mg tablet  Self No No   Sig: Take 1 tablet (325 mg total) by mouth 2 (two) times a day with meals   furosemide (LASIX) 20 mg tablet   No No   Sig: Take 2 tablets (40 mg total) by mouth daily   gabapentin (NEURONTIN) 300 mg capsule   No No   Sig: TAKE 2 CAPSULES (600 MG TOTAL) BY MOUTH 2 (TWO) TIMES A DAY TO REDUCE NERVE PAIN IN FEET.   loperamide (IMODIUM) 2 mg capsule  Self No No   Sig: Take 1 capsule (2 mg total) by mouth 4 (four) times a day as needed for diarrhea   losartan (COZAAR) 25 mg tablet   No No   Sig: TAKE 0.5 TABLETS (12.5 MG TOTAL) BY MOUTH DAILY   methocarbamol (ROBAXIN) 500 mg tablet   No No   Sig: Take 1 tablet (500 mg total) by mouth 3 (three) times a day   metoprolol succinate (TOPROL-XL) 25 mg 24 hr tablet   No No   Sig: TAKE HALF TABLET DAILY   oxyCODONE (ROXICODONE) 5 immediate release tablet   No No   Sig: Take 1 tablet (5 mg total) by mouth every 6 (six) hours as needed (cancer pain) Max Daily Amount: 20 mg   potassium chloride (K-DUR,KLOR-CON) 20 mEq tablet   No No   Sig: TAKE 1 TABLET (20 MEQ TOTAL) BY MOUTH DAILY   predniSONE 5 mg tablet   No No   Sig: TAKE 1 TABLET (5 MG TOTAL) BY MOUTH 2 (TWO) TIMES A DAY WITH MEALS      Facility-Administered Medications: None       Past Medical History:   Diagnosis Date    Cancer (HCC) 01/2002     tailbone    Coronary artery disease     S/p stenting to circumflex and RCA    HFrEF (heart failure with reduced ejection fraction) ([facility]) 2021    High cholesterol     Prostate cancer (HCC)        Past Surgical History:   Procedure Laterality Date    CARDIAC ELECTROPHYSIOLOGY PROCEDURE N/A 2021    Procedure: Implant ICD - bi ventricular;  Surgeon: Jonas Oviedo MD;  Location:  CARDIAC CATH LAB;  Service: Cardiology    CARDIAC SURGERY      IR LOWER EXTREMITY ANGIOGRAM  2023    GA TEAEC W/WO PATCH GRAFT COMMON FEMORAL Right 2021    Procedure: ENDARTERECTOMY ARTERIAL FEMORAL;  Surgeon: Serina Cook DO;  Location: [facility];  Service: Vascular       Family History   Problem Relation Age of Onset    Cancer Mother      I have reviewed and agree with the history as documented.    E-Cigarette/Vaping    E-Cigarette Use Never User      E-Cigarette/Vaping Substances    Nicotine No     THC No     CBD No     Flavoring No     Other No     Unknown No      Social History     Tobacco Use    Smoking status: Former     Current packs/day: 0.00     Types: Cigarettes     Start date: 1962     Quit date: 2002     Years since quittin.5     Passive exposure: Past    Smokeless tobacco: Never   Vaping Use    Vaping status: Never Used   Substance Use Topics    Alcohol use: Not Currently    Drug use: Not Currently       Review of Systems   Constitutional:  Negative for chills and fever.   HENT:  Negative for congestion, rhinorrhea and sore throat.    Respiratory:  Negative for cough and shortness of breath.    Cardiovascular:  Negative for chest pain and palpitations.   Gastrointestinal:  Positive for constipation. Negative for abdominal pain, diarrhea, nausea and vomiting.   Genitourinary:  Positive for difficulty urinating. Negative for dysuria, flank pain, hematuria, penile discharge, scrotal swelling and testicular pain.   Musculoskeletal:  Negative for back pain and neck pain.   Neurological:   Negative for weakness, light-headedness, numbness and headaches.   All other systems reviewed and are negative.      Physical Exam  Physical Exam  Vitals and nursing note reviewed.   Constitutional:       Appearance: Normal appearance. He is obese.      Comments: Appears uncomfortable due to symptoms of urinary retention but otherwise no acute distress.   HENT:      Head: Normocephalic and atraumatic.      Right Ear: External ear normal.      Left Ear: External ear normal.      Nose: Nose normal.      Mouth/Throat:      Mouth: Mucous membranes are moist.      Pharynx: Oropharynx is clear. No oropharyngeal exudate or posterior oropharyngeal erythema.   Eyes:      Extraocular Movements: Extraocular movements intact.      Conjunctiva/sclera: Conjunctivae normal.      Pupils: Pupils are equal, round, and reactive to light.   Cardiovascular:      Rate and Rhythm: Normal rate and regular rhythm.      Pulses: Normal pulses.      Heart sounds: Normal heart sounds.   Pulmonary:      Effort: Pulmonary effort is normal. No respiratory distress.      Breath sounds: Normal breath sounds. No wheezing or rales.   Abdominal:      General: Abdomen is flat. Bowel sounds are normal. There is no distension.      Palpations: Abdomen is soft.      Tenderness: There is no right CVA tenderness, left CVA tenderness or guarding.      Comments: Mild suprapubic fullness and tenderness to palpation.  Abdomen is otherwise soft, obese, and nondistended.  No guarding or rebound.   Genitourinary:     Comments: No saddle anesthesia  Musculoskeletal:         General: No swelling or tenderness. Normal range of motion.      Cervical back: Normal range of motion and neck supple. No tenderness.      Comments: No acute midline spine tenderness on palpation, no step-offs.  Neurovascularly intact distally.   Skin:     General: Skin is warm and dry.   Neurological:      General: No focal deficit present.      Mental Status: He is alert and oriented to  person, place, and time.      Sensory: No sensory deficit.      Motor: No weakness.         Vital Signs  ED Triage Vitals [01/18/24 0614]   Temperature Pulse Respirations Blood Pressure SpO2   98.2 °F (36.8 °C) 95 20 129/64 97 %      Temp Source Heart Rate Source Patient Position - Orthostatic VS BP Location FiO2 (%)   Oral Monitor Lying Left arm --      Pain Score       4           Vitals:    01/18/24 0614   BP: 129/64   Pulse: 95   Patient Position - Orthostatic VS: Lying         Visual Acuity      ED Medications  Medications - No data to display    Diagnostic Studies  Results Reviewed       Procedure Component Value Units Date/Time    Comprehensive metabolic panel [[id number]]  (Abnormal) Collected: 01/18/24 0652    Lab Status: Final result Specimen: Blood from Hand, Left Updated: 01/18/24 0716     Sodium 139 mmol/L      Potassium 3.6 mmol/L      Chloride 105 mmol/L      CO2 24 mmol/L      ANION GAP 10 mmol/L      BUN 11 mg/dL      Creatinine 0.76 mg/dL      Glucose 111 mg/dL      Calcium 8.5 mg/dL      AST 57 U/L       U/L      Alkaline Phosphatase 67 U/L      Total Protein 6.0 g/dL      Albumin 3.5 g/dL      Total Bilirubin 0.78 mg/dL      eGFR 87 ml/min/1.73sq m     Narrative:      National Kidney Disease Foundation guidelines for Chronic Kidney Disease (CKD):     Stage 1 with normal or high GFR (GFR > 90 mL/min/1.73 square meters)    Stage 2 Mild CKD (GFR = 60-89 mL/min/1.73 square meters)    Stage 3A Moderate CKD (GFR = 45-59 mL/min/1.73 square meters)    Stage 3B Moderate CKD (GFR = 30-44 mL/min/1.73 square meters)    Stage 4 Severe CKD (GFR = 15-29 mL/min/1.73 square meters)    Stage 5 End Stage CKD (GFR <15 mL/min/1.73 square meters)  Note: GFR calculation is accurate only with a steady state creatinine    Lipase [[id number]]  (Normal) Collected: 01/18/24 0652    Lab Status: Final result Specimen: Blood from Hand, Left Updated: 01/18/24 0716     Lipase 11 u/L     UA w Reflex to Microscopic w Reflex  to Culture [[id number]]  (Abnormal) Collected: 01/18/24 0653    Lab Status: Final result Specimen: Urine, Indwelling Moser Catheter Updated: 01/18/24 0705     Color, UA Yellow     Clarity, UA Clear     Specific Gravity, UA 1.020     pH, UA 5.5     Leukocytes, UA Negative     Nitrite, UA Negative     Protein, UA Negative mg/dl      Glucose, UA 3+ mg/dl      Ketones, UA Negative mg/dl      Urobilinogen, UA 0.2 E.U./dl      Bilirubin, UA Negative     Occult Blood, UA Negative    CBC and differential [[id number]]  (Abnormal) Collected: 01/18/24 0652    Lab Status: Final result Specimen: Blood from Hand, Left Updated: 01/18/24 0659     WBC 5.61 Thousand/uL      RBC 3.70 Million/uL      Hemoglobin 11.3 g/dL      Hematocrit 35.2 %      MCV 95 fL      MCH 30.5 pg      MCHC 32.1 g/dL      RDW 13.2 %      MPV 10.0 fL      Platelets 190 Thousands/uL      nRBC 0 /100 WBCs      Neutrophils Relative 70 %      Immat GRANS % 1 %      Lymphocytes Relative 14 %      Monocytes Relative 11 %      Eosinophils Relative 3 %      Basophils Relative 1 %      Neutrophils Absolute 3.97 Thousands/µL      Immature Grans Absolute 0.03 Thousand/uL      Lymphocytes Absolute 0.78 Thousands/µL      Monocytes Absolute 0.63 Thousand/µL      Eosinophils Absolute 0.17 Thousand/µL      Basophils Absolute 0.03 Thousands/µL                    CT abdomen pelvis with contrast    (Results Pending)   CT recon only lumbar spine    (Results Pending)              Procedures  POC Renal US    Date/Time: 1/18/2024 7:25 AM    Performed by: Ean Ford MD  Authorized by: Ean Ford MD    Patient location:  ED  Performed by:  Attending  Other Assisting Provider: No    Procedure details:     Indications: urinary retention      Assessment for:  Bladder volume    Views obtained: bladder (transverse and sagittal)      Image quality: diagnostic      Image availability:  Images available in PACS  Findings:     Bladder:  Visualized    Bladder findings: distended bladder     Interpretation:     Renal ultrasound impressions comment:  Urinary retention and bladder distention, approximately 580 mL           ED Course                               SBIRT 20yo+      Flowsheet Row Most Recent Value   Initial Alcohol Screen: US AUDIT-C     1. How often do you have a drink containing alcohol? 0 Filed at: 01/18/2024 0613   2. How many drinks containing alcohol do you have on a typical day you are drinking?  0 Filed at: 01/18/2024 0613   3b. FEMALE Any Age, or MALE 65+: How often do you have 4 or more drinks on one occassion? 0 Filed at: 01/18/2024 0613   Audit-C Score 0 Filed at: 01/18/2024 0613   IVONNE: How many times in the past year have you...    Used an illegal drug or used a prescription medication for non-medical reasons? Never Filed at: 01/18/2024 0613                      Medical Decision Making  77 y/o male with hx of CAD, HLD, and treatment resistant prostate cancer with bony metastases (dx 2003, s/p external beam radiation 2003, bony metastases diagnosed 2008, currently undergoing treatment with Dr. Hernandez) presents to the ED via EMS from home for evaluation of constipation x 1 week and urinary retention since yesterday.  The patient states that he has had intermittent constipation for several months, which he attributes to his prostate cancer.  He typically has a bowel movement every 5-7 days and states that his last bowel movement was a week ago and he has had no relief with over-the-counter treatments.  Since last night he has noticed new urinary retention, which he has never had in the past.  He has chronic lower back pain from his bony metastases but denies any change in the pain.  He currently reports suprapubic fullness and discomfort.  He denies any focal weakness, numbness, saddle anesthesia, upper abdominal pain, nausea, vomiting, diarrhea, dysuria, hematuria, or flank pain.    Vital signs reviewed.  See physical exam documentation for exam findings.  Differential diagnosis  includes but is not limited to worsening of chronic constipation, urinary retention (possibly due to constipation), worsening spinal mets, urinary tract infection, diverticular disease.  Currently my suspicion of acute spinal cord compression or cauda equina is low as the patient has no focal numbness, weakness, or saddle anesthesia and states that his chronic lower back pain is at baseline.  Will treat with bladder catheterization and evaluate further with labs, urine, and CT abdomen/pelvis with lumbar recons.  Care for the patient was signed out at end of shift prior to completion of workup and final disposition.    Amount and/or Complexity of Data Reviewed  Labs: ordered.             Disposition  Final diagnoses:   Urinary retention   Constipation   Prostate cancer metastatic to bone (HCC)     Time reflects when diagnosis was documented in both MDM as applicable and the Disposition within this note       Time User Action Codes Description Comment    1/18/2024  7:27 AM Ean Ford [R33.9] Urinary retention     1/18/2024  7:27 AM Ean Ford [K59.00] Constipation     1/18/2024  7:27 AM Ean Ford [C61,  C79.51] Prostate cancer metastatic to bone (HCC)           ED Disposition       None          Follow-up Information    None         Patient's Medications   Discharge Prescriptions    No medications on file       No discharge procedures on file.    PDMP Review         Value Time User    PDMP Reviewed  Yes 12/28/2023  2:23 PM Gavin Andrew MD            ED Provider  Electronically Signed by             Ean Ford MD  01/18/24 0728

## 2024-01-18 NOTE — ED CARE HANDOFF
Emergency Department Sign Out Note        Sign out and transfer of care from Dr. Ford. See Separate Emergency Department note.     The patient, Royce Rene, was evaluated by the previous provider for constipation, urinary retention.    Workup Completed:  Labs, moser placement    ED Course / Workup Pending (followup):  Patient signed out pending CT read and disposition.  CT shows constipation with associated proctitis likely stercoral, small prostate and bladder decompressed around Moser.  I discussed the patient with the urology provider on-call who advised that his new urinary retention and constipation do not appear to be related to any sort of spinal compression as there is no evidence of spinal compression on the scan.  They recommend aggressive bowel regimen and outpatient follow-up with urology in 1 to 2 weeks for trial of void.  Patient was able to have multiple large bowel movements while in the ED without administering any laxatives.  I clarified with the patient and he states that he does not usually take a bowel regimen and only took 1 pill in the middle of last night.  Was planning to discharge him home but then it was unclear how it would be possible to get him home given his mobility issues.  Patient also having a lot of difficulty with Moser teaching due to poor vision and initially was unable to empty his Moser bag even with repeated instructions.  We are initially unable to contact his family and learned from previous notes that his family is not always available or willing to help him.  I consulted case management discussed the patient with inpatient medicine regarding whether or not he would merit admission due to inability to perform necessary medical care at home.  Patient was evaluated by PT and OT who did not recommend rehab placement stating that his may need was relating to Moser management but that he was able to ambulate safely with a walker.  After multiple attempts we were able to  contact patient's son will be able to come  the patient in a few hours and observe Moser teaching to help the patient empty his bag daily.                                     Procedures  Medical Decision Making  Amount and/or Complexity of Data Reviewed  Labs: ordered.  Radiology: ordered.    Risk  OTC drugs.  Prescription drug management.            Disposition  Final diagnoses:   Urinary retention   Constipation   Prostate cancer metastatic to bone (HCC)     Time reflects when diagnosis was documented in both MDM as applicable and the Disposition within this note       Time User Action Codes Description Comment    1/18/2024  7:27 AM Ean Ford [R33.9] Urinary retention     1/18/2024  7:27 AM Ean Ford [K59.00] Constipation     1/18/2024  7:27 AM Ean Ford [C61,  C79.51] Prostate cancer metastatic to bone (HCC)           ED Disposition       ED Disposition   Discharge    Condition   Stable    Date/Time   Thu Jan 18, 2024 11:01 AM    Comment   Royce BAER Free discharge to home/self care.                   Follow-up Information       Follow up With Specialties Details Why Contact Info Additional Information    [facility] Schedule an appointment as soon as possible for a visit in 1 week  [address] [facility] [address]  [facility] [address]  [phone] [facility] [place], [address] [place] [address], [place], [place], [address]   [phone]          Patient's Medications   Discharge Prescriptions    DOCUSATE SODIUM (COLACE) 100 MG CAPSULE    Take 1 capsule (100 mg total) by mouth every 12 (twelve) hours       Start Date: 1/18/2024 End Date: --       Order Dose: 100 mg       Quantity: 60 capsule    Refills: 0    POLYETHYLENE GLYCOL (GLYCOLAX) 17 GM/SCOOP POWDER    Take 17 g by mouth daily Mix one cap full with 8 ounces of liquid every day       Start Date: 1/18/2024 End Date: --       Order Dose: 17 g       Quantity: 510 g     Refills: 0     No discharge procedures on file.       ED Provider  Electronically Signed by     Darshana Morales MD  01/18/24 9618

## 2024-01-18 NOTE — ED NOTES
"RN called pt's son again, s/w Rodrigue Rene @ [phone].  Rodrigue confirms he lives with pt, states he works 2 jobs so he is \"not home for him as much as I would like\". Rodrigue states he is refereeing this evening but can come to hospital at 1930 for barber care teaching. Rodrigue confirms that pt can be discharged to his care, confirms he can take pt back home this evening when he comes for barber teaching. Provider and charge RN aware of plan.     Rosemarie Correa RN  01/18/24 1510    "

## 2024-01-18 NOTE — ED NOTES
Patient ate 10 percent of dinner, currently sleeping, call bell within reach     Sabrina Keenan RN  01/18/24 6947

## 2024-01-18 NOTE — ED NOTES
RN assisted pt to change positions in bed, pt sat on Edge of bed per pt request to eat his lunch. Pt instructed to call for assistance prior to getting up OOB or back into his bed. Pt has call bell in reach, he is eating provided lunch. Pt in no acute distress at this time.     Rosemarie Correa RN  01/18/24 4923

## 2024-01-18 NOTE — ED NOTES
RN s/w with daughter, pt's son did not answer when called by RN and pt. Per daughter, she cannot assist pt at home and she states she cannot help him, states her brother lives there with patient. Again, pt states his son is at work and he also cannot reach him. Patient is having trouble with self dressing, having trouble getting OOB (requires assistance by RN), pt having trouble understanding the barber cath care and emptying process. Provider is aware, patient will be admitted. Roundtrip cancelled.      Rosemarie Correa RN  01/18/24 1205       Rosemarie Correa RN  01/18/24 8697

## 2024-01-18 NOTE — ED NOTES
Son called at 1500 he will arrive for pickup at around 1930. Kenisha BUENO made updated patient on plan for discharge     Sabrina Keenan RN  01/18/24 1262

## 2024-01-18 NOTE — ED NOTES
[facility]      Marcelle Blair RN  01/18/24 3963

## 2024-01-18 NOTE — ASSESSMENT & PLAN NOTE
Patient presented with urinary retention requiring barber catheter placement in the ER.    Recommend continuation of catheter for now and attempt voiding trial prior to discharge.  CT demonstrates inflammatory-type changes around the bladder, which may suggest cystitis however UA is negative.

## 2024-01-18 NOTE — ASSESSMENT & PLAN NOTE
AST/ALT slightly elevated @ 57/104 respectively.  CT demonstrates hepatic steatosis but otherwise normal morphology.  May be secondary to treatment with [id number].  Should be monitored routinely.

## 2024-01-19 ENCOUNTER — TRANSITIONAL CARE MANAGEMENT (OUTPATIENT)
Dept: INTERNAL MEDICINE CLINIC | Facility: CLINIC | Age: 79
End: 2024-01-19

## 2024-01-19 NOTE — ED NOTES
Patient family at bedside to  patient. Educated about barber care      Shayan Grey, MALINI  01/18/24 2477

## 2024-01-23 NOTE — PROGRESS NOTES
Yes, please mail letter to call office and schedule a follow up appointment, especially since he was just discharged from the hospital.

## 2024-01-29 ENCOUNTER — TELEPHONE (OUTPATIENT)
Dept: PALLIATIVE MEDICINE | Facility: CLINIC | Age: 79
End: 2024-01-29

## 2024-01-30 ENCOUNTER — VBI (OUTPATIENT)
Dept: ADMINISTRATIVE | Facility: OTHER | Age: 79
End: 2024-01-30

## 2024-01-30 NOTE — TELEPHONE ENCOUNTER
01/30/24 12:55 PM     [employer] SmartForm used to document caregap status.    Sharon Harrison MA

## 2024-02-01 ENCOUNTER — OFFICE VISIT (OUTPATIENT)
Dept: UROLOGY | Facility: CLINIC | Age: 79
End: 2024-02-01
Payer: COMMERCIAL

## 2024-02-01 ENCOUNTER — TELEPHONE (OUTPATIENT)
Dept: UROLOGY | Facility: CLINIC | Age: 79
End: 2024-02-01

## 2024-02-01 VITALS
OXYGEN SATURATION: 98 % | HEART RATE: 100 BPM | SYSTOLIC BLOOD PRESSURE: 130 MMHG | BODY MASS INDEX: 35.5 KG/M2 | DIASTOLIC BLOOD PRESSURE: 116 MMHG | HEIGHT: 67 IN | RESPIRATION RATE: 20 BRPM

## 2024-02-01 DIAGNOSIS — R33.9 URINARY RETENTION: Primary | ICD-10-CM

## 2024-02-01 PROCEDURE — 99213 OFFICE O/P EST LOW 20 MIN: CPT

## 2024-02-01 NOTE — PROGRESS NOTES
Office Visit- Urology  Royce BAER Free 1945 MRN: [id number]      Assessment/Discussion/Plan    78 y.o. male managed by     Urinary Retention   -likely due to patient's constipation  -patient states that he has not had a bowel movement since he was discharged from the hospital on 1/19  -conducting a trial of void today would not be appropriate as he would likely fail due to his current constipation and due to time of encounter today  -I advised patient that he needs to start utilizing his previously prescribed MiraLAX and Colace to have a regular bowel movements.  -I informed patient that his use of opioids for cancer pain may be a significant contributing factor for his constipation.  He does follow with palliative care  -He is scheduled for formal trial of void on Tuesday.  If above 300 mL catheter will need to be arranged.  And another attempt in 2 weeks.  If below 300 mL patient should follow-up in the office with AP for 6-week PVR    2.  Prostate cancer  -History of [place] prostate cancer treated with external beam radiation in 2003  -Recurrence in 2008 with bony metastasis identified  -Follows solely with medical oncology.    -Continue with treatment regimen with medical oncology.  Has upcoming appointment on 2/19/2024        Chief Complaint:   Royce is a 78 y.o. male presenting to the office for an initial evaluation regarding urinary retention        Subjective    Patient is a medically complex 78-year-old male with history of treatment resistant prostate cancer/metastatic prostate cancer who presents to the office today to establish care and for evaluation of urinary retention.  He has a history of [place] prostate cancer and underwent external beam radiation for completion in 2003.  In 2008 he had a rising PSA and bony metastasis was identified and he was initiated on Lupron.  He has been following with medical oncology and has been on multiple chemotherapeutic agents.  He has previously followed with  multiple medical oncologist but is now established with [facility] hematology oncology with Dr. Hernandez.  He is currently undergoing  therapy and was last seen by Dr. Hernandez in the office in December 2023.    Patient presents to our clinic due to his urinary retention.  He presented to the ER on 1/18/2024 for constipation.  He also reported suprapubic fullness and discomfort and was found to be in urinary retention.  A catheter was placed.  Patient was admitted overnight and he did have a bowel movement while in the ED.  Patient was discharged with instruction to utilize MiraLAX and Colace.  However patient states that he has not taken this as he does not want to have diarrhea.  He has not had a bowel movement since 1/18/2024.  He presents to the office today with expectation to have Moser catheter removed.  No problem with the catheter draining.  Patient states that he does nauseous at times    ROS:   Review of Systems   Constitutional: Negative.  Negative for chills, fatigue and fever.   HENT: Negative.     Respiratory:  Negative for shortness of breath.    Cardiovascular:  Negative for chest pain.   Gastrointestinal: Negative.  Negative for abdominal pain.   Endocrine: Negative.    Musculoskeletal: Negative.    Skin: Negative.    Neurological: Negative.  Negative for dizziness and light-headedness.   Hematological: Negative.    Psychiatric/Behavioral: Negative.           Past Medical History  Past Medical History:   Diagnosis Date    Cancer (Formerly [facility]) 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    Coronary artery disease 2001    S/p stenting to circumflex and RCA    HFrEF (heart failure with reduced ejection fraction) (Formerly [facility]) 06/14/2021    High cholesterol     Prostate cancer (Formerly [facility])        Past Surgical History  Past Surgical History:   Procedure Laterality Date    CARDIAC ELECTROPHYSIOLOGY PROCEDURE N/A 12/23/2021    Procedure: Implant ICD - bi ventricular;  Surgeon: Jonas Oviedo MD;  Location: [facility] CARD[facility]AC [facility];  Service:  Cardiology    CARDIAC SURGERY      IR LOWER EXTREMITY ANGIOGRAM  2023    MD TEAEC W/WO PATCH GRAFT COMMON FEMORAL Right 2021    Procedure: ENDARTERECTOMY ARTERIAL FEMORAL;  Surgeon: Serina Cook DO;  Location: [facility];  Service: Vascular       Past Family History  Family History   Problem Relation Age of Onset    Cancer Mother        Past Social history  Social History     Socioeconomic History    Marital status:      Spouse name: Not on file    Number of children: 3    Years of education: Not on file    Highest education level: Not on file   Occupational History    Not on file   Tobacco Use    Smoking status: Former     Current packs/day: 0.00     Types: Cigarettes     Start date: 1962     Quit date: 2002     Years since quittin.6     Passive exposure: Past    Smokeless tobacco: Never   Vaping Use    Vaping status: Never Used   Substance and Sexual Activity    Alcohol use: Not Currently    Drug use: Not Currently    Sexual activity: Not on file   Other Topics Concern    Not on file   Social History Narrative    Single    Son lives in the attic of his home, minimal contact     Social Determinants of Health     Financial Resource Strain: Low Risk  (2023)    Overall Financial Resource Strain (CARDIA)     Difficulty of Paying Living Expenses: Not hard at all   Food Insecurity: No Food Insecurity (2023)    Hunger Vital Sign     Worried About Running Out of Food in the Last Year: Never true     Ran Out of Food in the Last Year: Never true   Transportation Needs: No Transportation Needs (2023)    PRAPARE - Transportation     Lack of Transportation (Medical): No     Lack of Transportation (Non-Medical): No   Physical Activity: Not on file   Stress: Not on file   Social Connections: Not on file   Intimate Partner Violence: Not on file   Housing Stability: Unknown (2023)    Housing Stability Vital Sign     Unable to Pay for Housing in the Last Year: No     Number of  Places Lived in the Last Year: Not on file     Unstable Housing in the Last Year: No       Current Medications  Current Outpatient Medications   Medication Sig Dispense Refill    abiraterone (ZYTIGA) 250 mg tablet Take 4 tablets (1,000 mg total) by mouth once daily 120 tablet 10    Accu-Chek FastClix Lancets MISC TEST 2-3 TIMES AS DIRECTED      Accu-Chek Guide test strip TEST 2-3 TIMES AS DIRECTED      acetaminophen (TYLENOL) 500 mg tablet Take 2 tablets (1,000 mg total) by mouth 3 (three) times a day as needed for mild pain      Alcohol Swabs (Alcohol Pads) 70 % PADS USE 2-3 TIMES AS DIRECTED.      atorvastatin (LIPITOR) 80 mg tablet Take 1 tablet (80 mg total) by mouth daily with dinner 90 tablet 1    Blood Glucose Monitoring Suppl (Accu-Chek Guide Me) w/Device KIT USE AS DIRECTED 1 kit 0    cetirizine (ZyrTEC) 5 MG tablet Take 1 tablet (5 mg total) by mouth daily 90 tablet 3    docusate sodium (COLACE) 100 mg capsule Take 1 capsule (100 mg total) by mouth every 12 (twelve) hours 60 capsule 0    furosemide (LASIX) 20 mg tablet Take 2 tablets (40 mg total) by mouth daily 60 tablet 5    gabapentin (NEURONTIN) 300 mg capsule TAKE 2 CAPSULES (600 MG TOTAL) BY MOUTH 2 (TWO) TIMES A DAY TO REDUCE NERVE PAIN IN FEET. 120 capsule 0    Jardiance 10 MG TABS tablet TAKE 1 TABLET (10 MG TOTAL) BY MOUTH EVERY MORNING 30 tablet 11    Lancet Devices (Lancing Device) MISC  each 2    loperamide (IMODIUM) 2 mg capsule Take 1 capsule (2 mg total) by mouth 4 (four) times a day as needed for diarrhea 30 capsule 2    losartan (COZAAR) 25 mg tablet TAKE 0.5 TABLETS (12.5 MG TOTAL) BY MOUTH DAILY 45 tablet 1    methocarbamol (ROBAXIN) 500 mg tablet Take 1 tablet (500 mg total) by mouth 3 (three) times a day 90 tablet 0    metoprolol succinate (TOPROL-XL) 25 mg 24 hr tablet TAKE HALF TABLET DAILY 45 tablet 2    oxyCODONE (ROXICODONE) 5 immediate release tablet Take 1 tablet (5 mg total) by mouth every 6 (six) hours as needed  "(cancer pain) Max Daily Amount: 20 mg 120 tablet 0    polyethylene glycol (GLYCOLAX) 17 GM/SCOOP powder Take 17 g by mouth daily Mix one cap full with 8 ounces of liquid every day 510 g 0    potassium chloride (K-DUR,KLOR-CON) 20 mEq tablet TAKE 1 TABLET (20 MEQ TOTAL) BY MOUTH DAILY 30 tablet 2    predniSONE 5 mg tablet TAKE 1 TABLET (5 MG TOTAL) BY MOUTH 2 (TWO) TIMES A DAY WITH MEALS 60 tablet 5    aspirin 81 mg chewable tablet Chew 1 tablet (81 mg total) daily 90 tablet 0    capsicum (ZOSTRIX) 0.075 % topical cream Apply topically 3 (three) times a day for 7 days Apply on the feet and avoid areas of wounds or scabs 60 g 0    clopidogrel (PLAVIX) 75 mg tablet Take 1 tablet (75 mg total) by mouth daily 90 tablet 0    Diclofenac Sodium (VOLTAREN) 1 % APPLY 2 G TOPICALLY 4 (FOUR) TIMES A DAY FOR PAIN TO AFFECTED AREA 100 g 0    famotidine (PEPCID) 20 mg tablet Take 1 tablet (20 mg total) by mouth daily 30 tablet 2    ferrous sulfate 325 (65 Fe) mg tablet Take 1 tablet (325 mg total) by mouth 2 (two) times a day with meals 180 tablet 0     No current facility-administered medications for this visit.       Allergies  No Known Allergies    OBJECTIVE    Vitals   Vitals:    02/01/24 1037   BP: (!) 130/116   BP Location: Left arm   Patient Position: Sitting   Cuff Size: Large   Pulse: 100   Resp: 20   SpO2: 98%   Height: 5' 7\" (1.702 m)       PVR:    Physical Exam  Constitutional:       General: He is not in acute distress.     Appearance: Normal appearance. He is normal weight. He is not ill-appearing or toxic-appearing.   HENT:      Head: Normocephalic and atraumatic.   Eyes:      Conjunctiva/sclera: Conjunctivae normal.   Cardiovascular:      Rate and Rhythm: Normal rate.   Pulmonary:      Effort: Pulmonary effort is normal. No respiratory distress.   Skin:     General: Skin is warm and dry.   Neurological:      General: No focal deficit present.      Mental Status: He is alert and oriented to person, place, and time. " "     Cranial Nerves: No cranial nerve deficit.   Psychiatric:         Mood and Affect: Mood normal.         Behavior: Behavior normal.         Thought Content: Thought content normal.          Labs:     Lab Results   Component Value Date    PSA 26.51 (H) 12/11/2023    PSA 23.65 (H) 10/12/2023    PSA 37.26 (H) 07/10/2023    .9 (H) 05/15/2023     Lab Results   Component Value Date    CREATININE 0.76 01/18/2024      No results found for: \"HGBA1C\"  Lab Results   Component Value Date    CALCIUM 8.5 01/18/2024    K 3.6 01/18/2024    CO2 24 01/18/2024     01/18/2024    BUN 11 01/18/2024    CREATININE 0.76 01/18/2024       I have personally reviewed all pertinent lab results and reviewed with patient    Imaging       Gorge Aviles PA-C  Date: 2/1/2024 Time: 10:44 AM  [facility]    This note was written using fluency dictation software. Please excuse any resulting minor grammatical errors.  \  "

## 2024-02-06 ENCOUNTER — TELEPHONE (OUTPATIENT)
Dept: UROLOGY | Facility: MEDICAL CENTER | Age: 79
End: 2024-02-06

## 2024-02-06 ENCOUNTER — PROCEDURE VISIT (OUTPATIENT)
Dept: UROLOGY | Facility: MEDICAL CENTER | Age: 79
End: 2024-02-06
Payer: COMMERCIAL

## 2024-02-06 VITALS
WEIGHT: 220 LBS | HEIGHT: 67 IN | SYSTOLIC BLOOD PRESSURE: 140 MMHG | DIASTOLIC BLOOD PRESSURE: 76 MMHG | BODY MASS INDEX: 34.53 KG/M2 | HEART RATE: 116 BPM | OXYGEN SATURATION: 97 %

## 2024-02-06 DIAGNOSIS — I73.9 PAD (PERIPHERAL ARTERY DISEASE) (HCC): ICD-10-CM

## 2024-02-06 DIAGNOSIS — R33.9 URINARY RETENTION: Primary | ICD-10-CM

## 2024-02-06 PROCEDURE — 99211 OFF/OP EST MAY X REQ PHY/QHP: CPT

## 2024-02-06 RX ORDER — GABAPENTIN 300 MG/1
CAPSULE ORAL
Qty: 120 CAPSULE | Refills: 0 | Status: SHIPPED | OUTPATIENT
Start: 2024-02-06

## 2024-02-06 NOTE — TELEPHONE ENCOUNTER
"Spoke with pt. I asked him how he was voiding. He stated \"my belly is too fat so I cannot see what's going on down there but I did wet my shorts\". I asked pt if he felt he was emptying and he was not sure. He was unsure of whether he could come back this afternoon for his 3pm PVR, as he said his son is sick. He asked if the catheter would need to be reinserted and I stated yes, if you have more than 300cc's in your bladder. He said he was not having it put back in. He asked if I could I call him again in one hour to see if he was peeing yet. I agreed to this plan.  "

## 2024-02-06 NOTE — PROGRESS NOTES
"2/6/2024    Royce Rene  1945  [id number]    Diagnosis  Chief Complaint    Urinary Retention         Patient presents for barber catheter removal and PVR managed by Gorge Aviles PA-C.    Plan  Patient to follow up in 6 weeks with [facility] for PVR. Appointment day/time given to patient.     Procedure Barber removal/voiding trial    Barber catheter removed after deflation of an intact balloon. Patient tolerated well. Encouraged patient to hydrate well and return this afternoon for post void residual.  He knows he may return early if uncomfortable and unable to urinate. Patient agrees to this plan.    Patient refused to return this afternoon. Patient states he is urinating fine with no pain or discomfort. ED precautions reviewed with patient and 6 week follow up appointment was made with AP for PVR.    No results found for this or any previous visit (from the past 4 hour(s)).        Vitals:    02/06/24 0928   BP: 140/76   BP Location: Left arm   Patient Position: Sitting   Cuff Size: Standard   Pulse: (!) 116   SpO2: 97%   Weight: 99.8 kg (220 lb)   Height: 5' 7\" (1.702 m)           Maame Brenner RN       "

## 2024-02-08 ENCOUNTER — OFFICE VISIT (OUTPATIENT)
Dept: PALLIATIVE MEDICINE | Facility: CLINIC | Age: 79
End: 2024-02-08
Payer: COMMERCIAL

## 2024-02-08 VITALS
WEIGHT: 220.9 LBS | TEMPERATURE: 97.9 F | SYSTOLIC BLOOD PRESSURE: 110 MMHG | HEART RATE: 91 BPM | RESPIRATION RATE: 17 BRPM | DIASTOLIC BLOOD PRESSURE: 70 MMHG | BODY MASS INDEX: 34.6 KG/M2 | OXYGEN SATURATION: 100 %

## 2024-02-08 DIAGNOSIS — R54 FRAIL ELDERLY: Chronic | ICD-10-CM

## 2024-02-08 DIAGNOSIS — Z51.5 PALLIATIVE CARE PATIENT: ICD-10-CM

## 2024-02-08 DIAGNOSIS — K59.03 THERAPEUTIC OPIOID-INDUCED CONSTIPATION (OIC): Primary | ICD-10-CM

## 2024-02-08 DIAGNOSIS — T40.2X5A THERAPEUTIC OPIOID-INDUCED CONSTIPATION (OIC): Primary | ICD-10-CM

## 2024-02-08 DIAGNOSIS — G89.3 CANCER-RELATED PAIN: Chronic | ICD-10-CM

## 2024-02-08 DIAGNOSIS — F01.B0 MODERATE VASCULAR DEMENTIA WITHOUT BEHAVIORAL DISTURBANCE, PSYCHOTIC DISTURBANCE, MOOD DISTURBANCE, OR ANXIETY (HCC): ICD-10-CM

## 2024-02-08 PROCEDURE — 99215 OFFICE O/P EST HI 40 MIN: CPT | Performed by: FAMILY MEDICINE

## 2024-02-08 RX ORDER — OXYCODONE HYDROCHLORIDE 5 MG/1
5 TABLET ORAL EVERY 6 HOURS PRN
Qty: 120 TABLET | Refills: 0 | Status: SHIPPED | OUTPATIENT
Start: 2024-02-08

## 2024-02-08 RX ORDER — SENNOSIDES A AND B 8.6 MG/1
1 TABLET, FILM COATED ORAL 2 TIMES DAILY
Qty: 60 TABLET | Refills: 11 | Status: SHIPPED | OUTPATIENT
Start: 2024-02-08

## 2024-02-08 NOTE — ASSESSMENT & PLAN NOTE
- Pt continues to be an ongoing risk for medication misuse:   = blind in one eye, presbycusis, limited medical savvy with very limited oversight from his family in medication checks.   = Meds have been blisterpacked; unclear if pt has spare meds wandering about his home.   = Limited health literacy - does not know names of meds; has trouble reading bottles d/t visual impairment.  - If none of patient's children can act as financial or medical mohr of atty, for various reasons, we may need to petition for guardianship in order to accomplish the goal of keeping pt in his home, and pursing treatments to fight his cancer.  - [facility] would [facility].

## 2024-02-08 NOTE — ASSESSMENT & PLAN NOTE
- Ongoing close f/up in Palliative clinic.  Next visit 4 wks.  - No changes to opioids; will not increase while no objective sign of worsening illness

## 2024-02-08 NOTE — PROGRESS NOTES
Outpatient Follow-Up - Palliative and Supportive Care   Royce Rene 78 y.o. male [id number]    1. Therapeutic opioid-induced constipation (OIC)  -     senna (SENOKOT) 8.6 MG tablet; Take 1 tablet (8.6 mg total) by mouth 2 (two) times a day To prevent constipation.  Hold one dose for loose stool.    2. Cancer-related pain  -     oxyCODONE (ROXICODONE) 5 immediate release tablet; Take 1 tablet (5 mg total) by mouth every 6 (six) hours as needed (cancer pain) Max Daily Amount: 20 mg    3. Palliative care patient  Assessment & Plan:  - Ongoing close f/up in Palliative clinic.  Next visit 4 wks.  - No changes to opioids; will not increase while no objective sign of worsening illness    Orders:  -     oxyCODONE (ROXICODONE) 5 immediate release tablet; Take 1 tablet (5 mg total) by mouth every 6 (six) hours as needed (cancer pain) Max Daily Amount: 20 mg    4. Moderate vascular dementia without behavioral disturbance, psychotic disturbance, mood disturbance, or anxiety ([facility])    5. Frail elder // vulnerable adult  Assessment & Plan:  - Pt continues to be an ongoing risk for medication misuse:   = blind in one eye, presbycusis, limited medical savvy with very limited oversight from his family in medication checks.   = Meds have been blisterpacked; unclear if pt has spare meds wandering about his home.   = Limited health literacy - does not know names of meds; has trouble reading bottles d/t visual impairment.  - If none of patient's children can act as financial or medical mohr of atty, for various reasons, we may need to petition for guardianship in order to accomplish the goal of keeping pt in his home, and pursing treatments to fight his cancer.  - Pt would still need [facility].           Medications Discontinued During This Encounter   Medication Reason    docusate sodium (COLACE) 100 mg capsule     oxyCODONE (ROXICODONE) 5 immediate release tablet Reorder         Royce Rene was seen today for symptoms and planning cares  related to above illnesses.  I have reviewed the patient's controlled substance dispensing history in the Prescription Drug Monitoring Program in compliance with the [facility] regulations before prescribing any controlled substances.    They are invited to continue to follow with us.  If there are questions or concerns, please contact us through our clinic/answering service 24 hours a day, seven days a week.    Gavin Andrew  [facility]  [phone]      Visit Information    Accompanied By: none    Source of History: Self    History Limitations: limited health literacy, dementia    Contacts: son Rodrigue Rene - [phone]   Maricruz Steven  - [phone]    History of Present Illness      Royce Rene is a 78 y.o. male who presents in follow up of symptoms related to Stage IV prostate cancer, hormone treatment resistant.  He follows with Dr. Hernandez and Ms Philip for this.  There is also a significant cardiovascular history, with heart failure management by Dr. Trejo, and PAD with distal pain at rest in both feet.  He has an ICD for a h/o VT, and underwent TAVR in 6/2021.  Pertinent issues include: symptom management, resource management.      Since last visit, pt reports that no one in his life will help him.  He reports that he doesn't have prednisone at home, despite Dr. Hernandez has send 6mos of this med to pharmacy within last 2 mos.  Unclear if pt is having this med delivered; he will not ask his son for help, as son has two jobs.    Pt reports limited intake of solid foods d/t edentulous status.  He has no dentist to help him prepare dentures.    He reports forgetting several pills, carmen his midday doses.  He again reports ongoing memory challenges, of a general nature, that are worsening over time.  He reports feeling overwhelmed by the complexity of his cares, and he has lost several contact cards for his providers and assistants.      Pt has been able to pass urine  since removal of barber cath.  He does report that his stream is weak, and starting flow takes time.        He arrives by [employer] today, and is using a rollator that seems slightly undersized for his height.  He reports no transport support for non-cancer cares.      Time was taken today to attempt coordination of cares with the [facility] .  Ms Cindy Monteiro seems to have taken over his case from previous workers, and a message was left on her business machine today re: our concerns for food insecurity, transport challenges, and inability to coordinate cares for dentistry, medication, and hygiene.      Calls to pharmacy show that prednisone is continuing to be including in his bubblepak at doses written by Dr. Hernandez, and delivered to the home with his other meds.    Attempted yet again a call to pt's son and only family member who has ever endorsed a desire or willingness to be involved in his health - LM on VM.        He has seen a new PCP in [place], as well as his Cards provider.  He appears to be following their medical recs well enough, though his adherence to dietary resrictions remains an open question.  This stated, his wts have been stable, and he does not endorse HF symptoms such as cough, dyspnea, exercise limitation.      Phone call not to family attempted today, as we have been informed that pt's son will not answer phone before 2pm daily.  Pt's daughter Kush has never been helpful in care coordination in our experience, reportedly, she has singed off his case for any contact or care..    - Vision challenge -  He has fogginess in the far vision, and cannot read effectively.  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    - Heart failure - Advised by Cards to use 2g Na / 2L H2O diet, as well as daily wts  - Vascular health - 4/26/23, pt saw Dr. Abbasi in office, and it seems that the plan ongoing is for DAPT life-long, and  deferral of further cares to Podiatry.  Pt has not seen Podiatry since 3/28/23, as of 11/28/2023.  - Care coordination - Pt needs [facility] from [facility] [place] to continue cares with Onc, Palliative, and any other providers in [facility].  Pt completely unable to manage this issue on his own; is unaware of his PCP, nor the systems involved in [facility]'s gatekeeping to specialty care / 's Choice program.   - Oncology Care - Dr. Hernandez has been effectively managing pt's medical illness, as noted by sustained suppression of his PSA on repeat measurements on current therapies.  Ms Keating of [facility] has been helpful in some degree of care coordinatoin re: pt's financial difficulties    - Vulnerable adult - He has had [employer] set up, but he reports [employer] would not take him home from treatments and tests.  Has Meals on Wheels weekdaily for midday meal only.  He can prep breakfast, but not any other meals.    Pt's son Rodrigue presented with him on 3/30/2023, and states that there are no waiver services set up, there are no formal mohr of atty, and that Rodrigue himself cannot be relied upon for transport, coordination, or medical supervision at this time.    If waiver services could be gained, Rodrigue would wish to be the beneficiary of caregiver hours.  We discussed today that it would then be best to have daughter Kush have both medical and financial mohr of atty.  However, Kush has expressed a desire never to be involved in her father's cares.  Pt's last son Royce Bermeo lives in [place], and has not expressed any interest in providing assistance nor support.    Pt reports that he is not eating well because he cannot cook for himself.  On 2/8/2024, he notes that he is mostly eatign liquids and soft meals, as he has no good teeth, and he has no dentist to help him set up dentures.  Son will occ grocery shop, but does not prep meals.    Pt pays mortgage on his home, and has not added anyone to the deed/title.  He reports  "that this is due to GI bill provisions, and since Alec is not a , he cannot have the home through this mortgage.    He does not know how to get care thru VA system, though one of his VA provider told him he would need to see a specialist.   He also endorses losing items about his home, and a concern about his memory.  Chores about the house are nearly impossible because he cannot bend over, and he can't stand for more than about 3min at a time.    - Home DME, supports -    = Pt has stairglide/chairlift in home.  Confirmed that this was resolved and improved by home health RN 3/30/23.   = Transport - Relies on [employer] for all support.  Family unhelpful.  Pt's 's license revoked by State, pending medical evaluation and re-testing.      From our first visit on 3/1/22:    \"Pt has demonstrated -- over a year ago now -- progression of his illness (rising PSA and decreasing functional status) despite aggressive therapies and cytotoxic treatments.  His side effects vs decompensation in his overall illness was so severe he was admitted to [facility] in march 2021.  Since that time, he has recovered some function with careful management of his cardiac disease, and less aggressive, less toxic cancer cares.  At this time, his treatment plan involves Xofigo for bone-avid disease, +/- Lupron.  We note that the patient has demonstrated some disease progression on other hormonal blockers, but that this plan of care is being offered, if not rec'd, by Dr. Toledo.    Pt presents to our office with suboptimally controlled pain, ongoing wt loss, poor appetite, and general malaise.  His concerns are more related to his back and R foot pain; a Podiatrist today advised him that his unilateral neuropathic symptoms 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.       Today, he can't quite recall his medications, he just insists that he needs 'something stronger than tynlenol'  When we pointed out that he has used " "tramadol and oxyIR recently, he also stated that these medications were not helpful.      Pt endorses support from his son, who will drive pt to appts and on other errands.   Pt can't name his meds, but has some vague understanding of the indications for his meds.\"    Past medical, surgical, social, and family histories are reviewed and pertinent updates are made.    Review of Systems   Unable to perform ROS: Dementia         Vital Signs    /70 (BP Location: Left arm, Patient Position: Sitting, Cuff Size: Standard)   Pulse 91   Temp 97.9 °F (36.6 °C) (Temporal)   Resp 17   Wt 100 kg (220 lb 14.4 oz)   SpO2 100%   BMI 34.60 kg/m²     Physical Exam and Objective Data  Physical Exam  Constitutional:       General: He is not in acute distress.     Appearance: He is ill-appearing. He is not toxic-appearing or diaphoretic.      Comments: Frail, overweight, unkempt, and slightly underdressed for weather.  (Wearing cutoff shorts in 40 degree weather)   HENT:      Head: Normocephalic and atraumatic.      Right Ear: External ear normal.      Left Ear: External ear normal.   Eyes:      General:         Right eye: No discharge.         Left eye: No discharge.      Conjunctiva/sclera: Conjunctivae normal.      Pupils: Pupils are equal, round, and reactive to light.   Neck:      Trachea: No tracheal deviation.   Cardiovascular:      Rate and Rhythm: Regular rhythm. Tachycardia present.   Pulmonary:      Effort: Pulmonary effort is normal. No respiratory distress.      Breath sounds: No stridor.   Abdominal:      General: There is distension.      Palpations: Abdomen is soft.      Comments: paunchy   Skin:     General: Skin is warm and dry.      Coloration: Skin is pale.      Findings: No erythema or rash.   Neurological:      General: No focal deficit present.      Mental Status: Mental status is at baseline. He is disoriented.      Cranial Nerves: No cranial nerve deficit.   Psychiatric:      Comments: Limited " insight, poor judgment       Radiology and Laboratory:  I personally reviewed and interpreted the following results: none new    I have spent a total time of 45+ minutes on 2/8/2024  in caring for this patient including chart review; symptom pursuit; supportive listening; anticipatory guidance. Attempt to [facility] cares with [facility].  Attempt to coordinate cares with  pt's son

## 2024-02-15 ENCOUNTER — TELEPHONE (OUTPATIENT)
Dept: HEMATOLOGY ONCOLOGY | Facility: CLINIC | Age: 79
End: 2024-02-15

## 2024-02-15 ENCOUNTER — TELEPHONE (OUTPATIENT)
Dept: LAB | Facility: HOSPITAL | Age: 79
End: 2024-02-15

## 2024-02-16 ENCOUNTER — TELEPHONE (OUTPATIENT)
Dept: HEMATOLOGY ONCOLOGY | Facility: CLINIC | Age: 79
End: 2024-02-16

## 2024-02-16 NOTE — TELEPHONE ENCOUNTER
Spoke with pt who tells me he continues to take four abiaterone pills a day. Denies any issues or questions. Last fill per [facility] was 1/25.

## 2024-02-19 ENCOUNTER — TELEPHONE (OUTPATIENT)
Dept: HEMATOLOGY ONCOLOGY | Facility: CLINIC | Age: 79
End: 2024-02-19

## 2024-02-19 NOTE — TELEPHONE ENCOUNTER
Called the patient in regards to his f/u appt on 2/19/24 with Dr. Hernandez at 11:40 AM. Was unable to reschedule the patient due to no answer and the mailbox being full.

## 2024-02-19 NOTE — TELEPHONE ENCOUNTER
Called Rodrigue Rene in regards to his dad (Royce Rene)'s appt with Dr. Hernandez at 11:40 AM on 2/19/24. Left a message to reschedule with the [facility].

## 2024-02-20 DIAGNOSIS — E87.6 HYPOKALEMIA: ICD-10-CM

## 2024-02-26 RX ORDER — POTASSIUM CHLORIDE 20 MEQ/1
20 TABLET, EXTENDED RELEASE ORAL DAILY
Qty: 30 TABLET | Refills: 1 | Status: SHIPPED | OUTPATIENT
Start: 2024-02-26 | End: 2024-05-26

## 2024-02-27 DIAGNOSIS — I73.9 PAD (PERIPHERAL ARTERY DISEASE) (HCC): ICD-10-CM

## 2024-02-27 RX ORDER — GABAPENTIN 300 MG/1
CAPSULE ORAL
Qty: 120 CAPSULE | Refills: 0 | Status: SHIPPED | OUTPATIENT
Start: 2024-02-27

## 2024-03-06 ENCOUNTER — HOSPITAL ENCOUNTER (OUTPATIENT)
Dept: INFUSION CENTER | Facility: CLINIC | Age: 79
Discharge: HOME | End: 2024-03-06
Payer: COMMERCIAL

## 2024-03-06 VITALS — WEIGHT: 210 LBS | BODY MASS INDEX: 32.89 KG/M2

## 2024-03-06 DIAGNOSIS — L08.9 DIABETIC FOOT INFECTION: ICD-10-CM

## 2024-03-06 DIAGNOSIS — C79.51 MALIGNANT NEOPLASM METASTATIC TO BONE (HCC): ICD-10-CM

## 2024-03-06 DIAGNOSIS — E11.628 DIABETIC FOOT INFECTION: ICD-10-CM

## 2024-03-06 DIAGNOSIS — E11.9 DIABETES MELLITUS (HCC): ICD-10-CM

## 2024-03-06 DIAGNOSIS — Z95.828 PORT-A-CATH IN PLACE: Primary | ICD-10-CM

## 2024-03-06 DIAGNOSIS — C61 PROSTATE CANCER (HCC): ICD-10-CM

## 2024-03-06 DIAGNOSIS — Z51.5 PALLIATIVE CARE PATIENT: Primary | ICD-10-CM

## 2024-03-06 LAB
ALBUMIN SERPL BCP-MCNC: 4 G/DL (ref 3.5–5)
ALP SERPL-CCNC: 71 U/L (ref 34–104)
ALT SERPL W P-5'-P-CCNC: 19 U/L (ref 7–52)
ANION GAP SERPL CALCULATED.3IONS-SCNC: 10 MMOL/L
AST SERPL W P-5'-P-CCNC: 13 U/L (ref 13–39)
BILIRUB SERPL-MCNC: 0.72 MG/DL (ref 0.2–1)
BUN SERPL-MCNC: 18 MG/DL (ref 5–25)
CALCIUM SERPL-MCNC: 8.5 MG/DL (ref 8.4–10.2)
CHLORIDE SERPL-SCNC: 106 MMOL/L (ref 96–108)
CO2 SERPL-SCNC: 23 MMOL/L (ref 21–32)
CREAT SERPL-MCNC: 0.92 MG/DL (ref 0.6–1.3)
GFR SERPL CREATININE-BSD FRML MDRD: 79 ML/MIN/1.73SQ M
GLUCOSE SERPL-MCNC: 96 MG/DL (ref 65–140)
POTASSIUM SERPL-SCNC: 3.5 MMOL/L (ref 3.5–5.3)
PROT SERPL-MCNC: 6.5 G/DL (ref 6.4–8.4)
SODIUM SERPL-SCNC: 139 MMOL/L (ref 135–147)

## 2024-03-06 PROCEDURE — 80053 COMPREHEN METABOLIC PANEL: CPT | Performed by: INTERNAL MEDICINE

## 2024-03-06 RX ORDER — BLOOD SUGAR DIAGNOSTIC
STRIP MISCELLANEOUS
Qty: 100 EACH | Refills: 0 | Status: SHIPPED | OUTPATIENT
Start: 2024-03-06

## 2024-03-06 RX ADMIN — DENOSUMAB 120 MG: 120 INJECTION SUBCUTANEOUS at 11:24

## 2024-03-06 RX ADMIN — LEUPROLIDE ACETATE 22.5 MG: KIT at 10:28

## 2024-03-06 NOTE — PROGRESS NOTES
Pt CC-81.4 and calcium 8.5, xgeva given in L arm, pt aware he needs to schedule next appointments on his way out, declined AVS

## 2024-03-06 NOTE — PROGRESS NOTES
Pt here for port flush/lupron/xgeva, I reached out to the office and they wanted a cmp as his last labs were in mid January, port acessed, cmp drawn, port flushed and de accessed. Lupron given in RUQ of tara, pt waiting in lobby for results of CMP in order to receive xgeva. Offers no complaints.

## 2024-03-06 NOTE — PLAN OF CARE
Problem: Knowledge Deficit  Goal: Patient/family/caregiver demonstrates understanding of disease process, treatment plan, medications, and discharge instructions  Description: Complete learning assessment and assess knowledge base.  Interventions:  - Provide teaching at level of understanding  - Provide teaching via preferred learning methods  Outcome: Progressing     Problem: Potential for Falls  Goal: Patient will remain free of falls  Description: INTERVENTIONS:  - Educate patient/family on patient safety including physical limitations  - Instruct patient to call for assistance with activity   - Consult OT/PT to assist with strengthening/mobility   - Keep Call bell within reach  - Keep bed low and locked with side rails adjusted as appropriate  - Keep care items and personal belongings within reach  - Initiate and maintain comfort rounds  - Make Fall Risk Sign visible to staff  - - Apply yellow socks and bracelet for high fall risk patients  - Consider moving patient to room near nurses station  Outcome: Progressing

## 2024-03-07 RX ORDER — BLOOD-GLUCOSE METER
EACH MISCELLANEOUS
Qty: 1 KIT | Refills: 1 | Status: SHIPPED | OUTPATIENT
Start: 2024-03-07

## 2024-03-11 ENCOUNTER — OFFICE VISIT (OUTPATIENT)
Dept: PALLIATIVE MEDICINE | Facility: CLINIC | Age: 79
End: 2024-03-11
Payer: COMMERCIAL

## 2024-03-11 VITALS
SYSTOLIC BLOOD PRESSURE: 130 MMHG | DIASTOLIC BLOOD PRESSURE: 70 MMHG | OXYGEN SATURATION: 98 % | BODY MASS INDEX: 34.39 KG/M2 | HEART RATE: 100 BPM | WEIGHT: 219.58 LBS | TEMPERATURE: 96.4 F

## 2024-03-11 DIAGNOSIS — G89.3 CANCER-RELATED PAIN: Chronic | ICD-10-CM

## 2024-03-11 DIAGNOSIS — R54 FRAIL ELDERLY: Chronic | ICD-10-CM

## 2024-03-11 DIAGNOSIS — G63 POLYNEUROPATHY ASSOCIATED WITH UNDERLYING DISEASE (HCC): Primary | Chronic | ICD-10-CM

## 2024-03-11 DIAGNOSIS — Z51.5 PALLIATIVE CARE PATIENT: ICD-10-CM

## 2024-03-11 PROCEDURE — 99214 OFFICE O/P EST MOD 30 MIN: CPT | Performed by: FAMILY MEDICINE

## 2024-03-11 RX ORDER — LANCETS
EACH MISCELLANEOUS
Qty: 102 EACH | OUTPATIENT
Start: 2024-03-11

## 2024-03-11 RX ORDER — OXYCODONE HYDROCHLORIDE 5 MG/1
5 TABLET ORAL EVERY 6 HOURS PRN
Qty: 120 TABLET | Refills: 0 | Status: SHIPPED | OUTPATIENT
Start: 2024-03-11

## 2024-03-11 RX ORDER — BLOOD SUGAR DIAGNOSTIC
STRIP MISCELLANEOUS
Qty: 100 EACH | OUTPATIENT
Start: 2024-03-11

## 2024-03-11 NOTE — PROGRESS NOTES
Outpatient Follow-Up - Palliative and Supportive Care   Royce Rene 78 y.o. male [id number]    1. Polyneuropathy associated with underlying disease (HCC)  -     Ambulatory Referral to Podiatry; Future    2. Cancer-related pain  -     oxyCODONE (ROXICODONE) 5 immediate release tablet; Take 1 tablet (5 mg total) by mouth every 6 (six) hours as needed (cancer pain) Max Daily Amount: 20 mg    3. Palliative care patient  -     oxyCODONE (ROXICODONE) 5 immediate release tablet; Take 1 tablet (5 mg total) by mouth every 6 (six) hours as needed (cancer pain) Max Daily Amount: 20 mg    4. Frail elder // vulnerable adult  Assessment & Plan:  - Pt continues to be an ongoing risk for medication misuse:   = blind in one eye, presbycusis, limited medical savvy with very limited oversight from his family in medication checks.   = Meds have been blisterpacked; unclear if pt has spare meds wandering about his home.   = Limited health literacy - does not know names of meds; has trouble reading bottles d/t visual impairment.  - If none of patient's children can act as financial or medical mohr of atty, for various reasons, we may need to petition for guardianship in order to accomplish the goal of keeping pt in his home, and pursing treatments to fight his cancer.  - [facility] would [facility].        Medications Discontinued During This Encounter   Medication Reason    oxyCODONE (ROXICODONE) 5 immediate release tablet Reorder         Royce Rene was seen today for symptoms and planning cares related to above illnesses.  I have reviewed the patient's controlled substance dispensing history in the Prescription Drug Monitoring Program in compliance with the [place] regulations before prescribing any controlled substances.    They are invited to continue to follow with us.  If there are questions or concerns, please contact us through our clinic/answering service 24 hours a day, seven days a week.    [facility]  [phone]      Visit Information    Accompanied By: none    Source of History: Self    History Limitations: limited health literacy, dementia    Contacts: son Rodrigue Rene - [phone]   Cindy Maricruz Monteiro  - [phone]    History of Present Illness      Royce Rene is a 78 y.o. male who presents in follow up of symptoms related to Stage IV prostate cancer, hormone treatment resistant.  He follows with Dr. Hernandez and Ms Philip for this.  There is also a significant cardiovascular history, with heart failure management by Dr. Trejo, and PAD with distal pain at rest in both feet.  He has an ICD for a h/o VT, and underwent TAVR in 6/2021.  Pertinent issues include: symptom management, resource management.      Since last visit, I have been informed by [employer] staff that the pt is indeed supervised and supported by his son, as much as the son appear to have the capacity between his jobs to help.  We do note that the patient has been making it to his scheduled infusions since our last visit, though he has missed an appt with the treating provider, and this has not been rescheduled.    We do note that the [employer] worker has needed the pt to complete a FED assessment at [phone].  Unfortunately, this process was not completed as requested, and the case has been closed by the Agency.      The patient arrives today wearing an open-toed soft-topped medical sandal.  He states he is using this today because the bottom of his foot hurts to walk on.      The pt also notes ongoing and worsening pain in the shoulder, knees, hips, knees, and feet.  He also notes that he has note been feeling well, and has stopped taking all his meds for the past few days.  He thinks this may be the reason he feels more pain today.  He does not know which pills are which in his blister pack.    He notes he missed his Onc appt, but has no idea of how to reschedule this himself.  He reports that he did not make last appt  because [facility] did not arrive.      In February, pt reports that no one in his life will help him.  He reports that he doesn't have prednisone at home, despite Dr. Hernandez has send 6mos of this med to pharmacy within last 2 mos.  Unclear if pt is having this med delivered; he will not ask his son for help, as son has two jobs.    Pt reports limited intake of solid foods d/t edentulous status.  He has no dentist to help him prepare dentures.    He reports forgetting several pills, carmen his midday doses.  He again reports ongoing memory challenges, of a general nature, that are worsening over time.  He reports feeling overwhelmed by the complexity of his cares, and he has lost several contact cards for his providers and assistants.    Pt has been able to pass urine since removal of barber cath.  He does report that his stream is weak, and starting flow takes time.        Time was taken today to attempt coordination of cares with the [facility] .  Ms Cindy Monteiro seems to have taken over his case from previous workers, and a message was left on her business machine today re: our concerns for food insecurity, transport challenges, and inability to coordinate cares for dentistry, medication, and hygiene.      Calls to pharmacy show that prednisone is continuing to be including in his bubblepak at doses written by Dr. Hernandez, and delivered to the home with his other meds.    Attempted yet again a call to pt's son and only family member who has ever endorsed a desire or willingness to be involved in his health - LM on VM.        We have been informed that pt's son will not answer phone before 2pm daily.  Pt's daughter Kush has never been helpful in care coordination in our experience, reportedly, she has singed off his case for any contact or care..    - Vision challenge -  He has fogginess in the far vision, and cannot read effectively.  He notes that he has monocular vision; the L eye is not at all  functional, and hasn't been for years.    - Heart failure - Advised by Cards to use 2g Na / 2L H2O diet, as well as daily wts  - Vascular health - 4/26/23, pt saw Dr. Abbasi in office, and it seems that the plan ongoing is for DAPT life-long, and deferral of further cares to Podiatry.  Pt has not seen Podiatry since 3/28/23, as of 11/28/2023.  - Care coordination - Pt needs [facility] from [facility] [place] to continue cares with Onc, Palliative, and any other providers in Network.  Pt completely unable to manage this issue on his own; is unaware of his PCP, nor the systems involved in [facility]'s gatekeeping to specialty care / [place] program.   - Oncology Care - Dr. Hernandez has been effectively managing pt's medical illness, as noted by sustained suppression of his PSA on repeat measurements on current therapies.  Ms Keating of [facility] has been helpful in some degree of care coordination re: pt's financial difficulties    - Vulnerable adult - He has had [facility] set up, but he reports [facility] would not take him home from treatments and tests.  Has Meals on Wheels weekdaily for midday meal only.  He can prep breakfast, but not any other meals.    Pt's son Rodirgue presented with him on 3/30/2023, and states that there are no waiver services set up, there are no formal mohr of atty, and that Rodrigue himself cannot be relied upon for transport, coordination, or medical supervision at this time.    If waiver services could be gained, Rodrigue would wish to be the beneficiary of caregiver hours.  We discussed today that it would then be best to have daughter Kush have both medical and financial mohr of atty.  However, Kush has expressed a desire never to be involved in her father's cares.  Pt's last son Royce Bermeo lives in [place], and has not expressed any interest in providing assistance nor support.    Pt reports that he is not eating well because he cannot cook for himself.  On 2/8/2024, he notes that he is mostly  "eatign liquids and soft meals, as he has no good teeth, and he has no dentist to help him set up dentures.  Son will occ grocery shop, but does not prep meals.    Pt pays mortgage on his home, and has not added anyone to the deed/title.  He reports that this is due to GI bill provisions, and since Alec is not a , he cannot have the home through this mortgage.    He does not know how to get care thru VA system, though one of his VA provider told him he would need to see a specialist.   He also endorses losing items about his home, and a concern about his memory.  Chores about the house are nearly impossible because he cannot bend over, and he can't stand for more than about 3min at a time.    - Home DME, supports -    = Pt has stairglide/chairlift in home.  Confirmed that this was resolved and improved by home health RN 3/30/23.   = Transport - Relies on [facility] for all support.  Family unhelpful.  Pt's 's license revoked by State, pending medical evaluation and re-testing.      From our first visit on 3/1/22:    \"Pt has demonstrated -- over a year ago now -- progression of his illness (rising PSA and decreasing functional status) despite aggressive therapies and cytotoxic treatments.  His side effects vs decompensation in his overall illness was so severe he was admitted to [facility] in march 2021.  Since that time, he has recovered some function with careful management of his cardiac disease, and less aggressive, less toxic cancer cares.  At this time, his treatment plan involves Xofigo for bone-avid disease, +/- Lupron.  We note that the patient has demonstrated some disease progression on other hormonal blockers, but that this plan of care is being offered, if not rec'd, by Dr. Toledo.    Pt presents to our office with suboptimally controlled pain, ongoing wt loss, poor appetite, and general malaise.  His concerns are more related to his back and R foot pain; a Podiatrist today " "advised him that his unilateral neuropathic symptoms 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.       Today, he can't quite recall his medications, he just insists that he needs 'something stronger than tynlenol'  When we pointed out that he has used tramadol and oxyIR recently, he also stated that these medications were not helpful.      Pt endorses support from his son, who will drive pt to appts and on other errands.   Pt can't name his meds, but has some vague understanding of the indications for his meds.\"    Past medical, surgical, social, and family histories are reviewed and pertinent updates are made.    Review of Systems   Unable to perform ROS: Dementia         Vital Signs    /70 (BP Location: Right arm, Patient Position: Sitting, Cuff Size: Standard)   Pulse 100   Temp (!) 96.4 °F (35.8 °C) (Temporal)   Wt 99.6 kg (219 lb 9.3 oz)   SpO2 98%   BMI 34.39 kg/m²     Physical Exam and Objective Data  Physical Exam  Constitutional:       General: He is not in acute distress.     Appearance: He is ill-appearing. He is not toxic-appearing or diaphoretic.      Comments: Frail, overweight   HENT:      Head: Normocephalic and atraumatic.      Right Ear: External ear normal.      Left Ear: External ear normal.   Eyes:      General:         Right eye: No discharge.         Left eye: No discharge.      Conjunctiva/sclera: Conjunctivae normal.      Pupils: Pupils are equal, round, and reactive to light.   Neck:      Trachea: No tracheal deviation.   Cardiovascular:      Rate and Rhythm: Regular rhythm. Tachycardia present.   Pulmonary:      Effort: Pulmonary effort is normal. No respiratory distress.      Breath sounds: No stridor.   Abdominal:      General: There is distension (overweight).      Palpations: Abdomen is soft.   Skin:     General: Skin is warm and dry.      Coloration: Skin is pale.      Findings: No erythema or rash.      Comments: Dystrophic nails, but no wounds on top, interdigital, nor solar " aspects of R foot.     Neurological:      Mental Status: Mental status is at baseline. He is disoriented.   Psychiatric:      Comments: Judgment and insight limited to poor       Radiology and Laboratory:  I personally reviewed and interpreted the following results: reviewed recent labs from 3/6; these are entirely nominal.  Of note, there is no PSA, and no BNP.    I have spent a total time of 45+ minutes on 3/11/2024  in caring for this patient including chart review; symptom pursuit; supportive listening; anticipatory guidance. Coordination with [facility] .

## 2024-03-11 NOTE — Clinical Note
Teammates, the social safety net for this pt is exhausted.  Family and pt will not initiate appropriate paperwork for waiver services.  If you see overt harms or negligence, pls alert adult protective services again.  At this time, they have little to offer otherwise.  Palliative will continue to attempt to engage family in caring for patient.  Pt himself appears completely incompetent to manage his affairs.

## 2024-03-15 ENCOUNTER — TELEPHONE (OUTPATIENT)
Dept: HEMATOLOGY ONCOLOGY | Facility: CLINIC | Age: 79
End: 2024-03-15

## 2024-03-15 NOTE — TELEPHONE ENCOUNTER
Left voicemail for pt reminding him to take Zytiga (Abitaterone) 4 pills every day for his prostate cancer. Asked that he call me back with any issues and provided my direct line. Per [facility] last fill was 2/21.

## 2024-03-19 DIAGNOSIS — I73.9 PAD (PERIPHERAL ARTERY DISEASE) (HCC): ICD-10-CM

## 2024-03-19 RX ORDER — GABAPENTIN 300 MG/1
CAPSULE ORAL
Qty: 120 CAPSULE | Refills: 0 | Status: SHIPPED | OUTPATIENT
Start: 2024-04-05

## 2024-03-19 NOTE — TELEPHONE ENCOUNTER
Reviewed most recent [facility] note. Last dispensed 3/8/24. Fill on/after 4/5/24. Blister pack requested.    Refilling medication (on behalf of Dr Andrew).

## 2024-03-26 ENCOUNTER — TELEPHONE (OUTPATIENT)
Dept: HEMATOLOGY ONCOLOGY | Facility: CLINIC | Age: 79
End: 2024-03-26

## 2024-03-27 ENCOUNTER — HOSPITAL ENCOUNTER (OUTPATIENT)
Dept: INFUSION CENTER | Facility: HOSPITAL | Age: 79
Discharge: HOME | End: 2024-03-27
Payer: COMMERCIAL

## 2024-03-27 ENCOUNTER — OFFICE VISIT (OUTPATIENT)
Dept: HEMATOLOGY ONCOLOGY | Facility: CLINIC | Age: 79
End: 2024-03-27
Payer: COMMERCIAL

## 2024-03-27 VITALS
DIASTOLIC BLOOD PRESSURE: 80 MMHG | OXYGEN SATURATION: 100 % | HEART RATE: 79 BPM | RESPIRATION RATE: 17 BRPM | SYSTOLIC BLOOD PRESSURE: 138 MMHG | TEMPERATURE: 97.6 F

## 2024-03-27 DIAGNOSIS — R97.21 RISING PSA FOLLOWING TREATMENT FOR MALIGNANT NEOPLASM OF PROSTATE: ICD-10-CM

## 2024-03-27 DIAGNOSIS — C61 PROSTATE CANCER (HCC): ICD-10-CM

## 2024-03-27 DIAGNOSIS — I50.33 ACUTE ON CHRONIC DIASTOLIC CHF (CONGESTIVE HEART FAILURE) (HCC): ICD-10-CM

## 2024-03-27 DIAGNOSIS — C79.51 MALIGNANT NEOPLASM METASTATIC TO BONE (HCC): Primary | ICD-10-CM

## 2024-03-27 DIAGNOSIS — Z95.828 PORT-A-CATH IN PLACE: Primary | ICD-10-CM

## 2024-03-27 DIAGNOSIS — Z95.828 PORT-A-CATH IN PLACE: ICD-10-CM

## 2024-03-27 LAB
ALBUMIN SERPL BCP-MCNC: 4 G/DL (ref 3.5–5)
ALP SERPL-CCNC: 80 U/L (ref 34–104)
ALT SERPL W P-5'-P-CCNC: 11 U/L (ref 7–52)
ANION GAP SERPL CALCULATED.3IONS-SCNC: 9 MMOL/L (ref 4–13)
AST SERPL W P-5'-P-CCNC: 9 U/L (ref 13–39)
BASOPHILS # BLD AUTO: 0.04 THOUSANDS/ÂΜL (ref 0–0.1)
BASOPHILS NFR BLD AUTO: 1 % (ref 0–1)
BILIRUB SERPL-MCNC: 0.52 MG/DL (ref 0.2–1)
BUN SERPL-MCNC: 18 MG/DL (ref 5–25)
CALCIUM SERPL-MCNC: 8.8 MG/DL (ref 8.4–10.2)
CHLORIDE SERPL-SCNC: 108 MMOL/L (ref 96–108)
CO2 SERPL-SCNC: 23 MMOL/L (ref 21–32)
CREAT SERPL-MCNC: 0.92 MG/DL (ref 0.6–1.3)
EOSINOPHIL # BLD AUTO: 0.06 THOUSAND/ÂΜL (ref 0–0.61)
EOSINOPHIL NFR BLD AUTO: 1 % (ref 0–6)
ERYTHROCYTE [DISTWIDTH] IN BLOOD BY AUTOMATED COUNT: 14.8 % (ref 11.6–15.1)
GFR SERPL CREATININE-BSD FRML MDRD: 79 ML/MIN/1.73SQ M
GLUCOSE SERPL-MCNC: 91 MG/DL (ref 65–140)
HCT VFR BLD AUTO: 36.8 % (ref 36.5–49.3)
HGB BLD-MCNC: 11.5 G/DL (ref 12–17)
IMM GRANULOCYTES # BLD AUTO: 0.1 THOUSAND/UL (ref 0–0.2)
IMM GRANULOCYTES NFR BLD AUTO: 1 % (ref 0–2)
LYMPHOCYTES # BLD AUTO: 1.27 THOUSANDS/ÂΜL (ref 0.6–4.47)
LYMPHOCYTES NFR BLD AUTO: 16 % (ref 14–44)
MCH RBC QN AUTO: 29.3 PG (ref 26.8–34.3)
MCHC RBC AUTO-ENTMCNC: 31.3 G/DL (ref 31.4–37.4)
MCV RBC AUTO: 94 FL (ref 82–98)
MONOCYTES # BLD AUTO: 0.59 THOUSAND/ÂΜL (ref 0.17–1.22)
MONOCYTES NFR BLD AUTO: 8 % (ref 4–12)
NEUTROPHILS # BLD AUTO: 5.85 THOUSANDS/ÂΜL (ref 1.85–7.62)
NEUTS SEG NFR BLD AUTO: 73 % (ref 43–75)
NRBC BLD AUTO-RTO: 0 /100 WBCS
PLATELET # BLD AUTO: 273 THOUSANDS/UL (ref 149–390)
PMV BLD AUTO: 9.4 FL (ref 8.9–12.7)
POTASSIUM SERPL-SCNC: 4.3 MMOL/L (ref 3.5–5.3)
PROT SERPL-MCNC: 6.5 G/DL (ref 6.4–8.4)
PSA SERPL-MCNC: 114.71 NG/ML (ref 0–4)
RBC # BLD AUTO: 3.92 MILLION/UL (ref 3.88–5.62)
SODIUM SERPL-SCNC: 140 MMOL/L (ref 135–147)
WBC # BLD AUTO: 7.91 THOUSAND/UL (ref 4.31–10.16)

## 2024-03-27 PROCEDURE — 84153 ASSAY OF PSA TOTAL: CPT | Performed by: PHYSICIAN ASSISTANT

## 2024-03-27 PROCEDURE — 99214 OFFICE O/P EST MOD 30 MIN: CPT | Performed by: PHYSICIAN ASSISTANT

## 2024-03-27 PROCEDURE — 80053 COMPREHEN METABOLIC PANEL: CPT | Performed by: PHYSICIAN ASSISTANT

## 2024-03-27 PROCEDURE — 85025 COMPLETE CBC W/AUTO DIFF WBC: CPT | Performed by: PHYSICIAN ASSISTANT

## 2024-03-27 PROCEDURE — G2211 COMPLEX E/M VISIT ADD ON: HCPCS | Performed by: PHYSICIAN ASSISTANT

## 2024-03-27 NOTE — PROGRESS NOTES
Port flushed per protocol, central labs drawn per orders, confirmed next appts 5-29-24 at [facility] provided.

## 2024-03-27 NOTE — PROGRESS NOTES
Hematology/Oncology Outpatient Follow- up Note  Royce Rene 78 y.o. male MRN: @ Encounter: [id number]        Date:  3/27/2024      Assessment / Plan:    Heavily pretreated metastatic [facility]. [facility] care to [facility] in October 2021.       History of Francisco Javier 7 prostate cancer s/p treatment with external beam radiation 7200cGy from 10/14/2003 to 12/11/2003.   He also got neoadjuvant and adjuvant LHRH for 9 months after radiation.      July 2008 with a rising PSA and bony metastasis identified.  initiated on Lupron      PSA was increasing and bone scan identified new lesions  3/13/2015 he was initiated on abiraterone        6/2017 progression of bony metastatic disease on imaging.  Treatment changed to enzalutamide.       2/2019 palliative Radiation to sacrum     March 2020 - March 2021 Docetaxel.       3/2021 - 10/2021 no cancer directed treatment as he had significant cardiac issues and was seen in [place].     10/[facility]     Lupron every 3 months     Xofigo from 11/4/2021 to 4/2022 5/2022 rechallenged with enzalutamide   8/2022 Xgeva was added for bone metastasis     May 13, 2023  (Pluvicto) first dose    PSA came down significantly after 3 cycles to 37 7/10/23 (245 5/15/23)    6/2023 abiraterone + prednisone also initiated.    10/12/23 PSA 24      12/14/2023 Dose #6     12/15/23- PLUVICTO THERAPY SPECT CT   Decreased radiotracer uptake in previously seen osseous lesions compatible with therapy response.     Patient continues on abiraterone + prednisone;  Lupron & Xgeva q 3 months.     Port flush in place - q 56 days       At his 12/12/23 f/u, repeat CBCD, CMP, PSA were ordered - not drawn.      Addendum 3/27/24-  [facility]/ lab draw.      Hgb 11.5, MCV 94. CMP normal.   (26 12/23)    Addendum 4/2/24-  .  D/W Dr. Hernandez.  Recommends f/u scan and then f/u to  discuss treatment options.        HPI:    Royce Rene is a 78 year old male seen initially by Dr. Toledo October 5, 2021 regarding metastatic castration resistant prostate cancer.     He has multiple medical conditions including a significant cardiovascular history, heart failure, PAD.  [facility]       Per [facility] of [facility] EMR, [facility] he was initially diagnosed with a Francisco Javier 7 prostate cancer s/p treatment with external beam radiation 7200cGy from 10/14/2003 to 12/11/2003.   He also got neoadjuvant and adjuvant LHRH for 9 months after radiation.       He was observed until July of 2008 when he was noted to have rising PSA and imaging done at that time showed new bony lesions.    He received Zoladex and later leuprolide.       PSA was increasing and bone scan identified new lesions.  3/13/2015 he was initiated on abiraterone     6/2017 He had evidence of aggressive bony metastatic disease on imaging.  Treatment changed to Enzalutamide.       His PSA continued to rise and he had progressive bony metastases including to the sacrum for which he received a course of palliative radiation, which was completed in February of 2019.  It was 3000 cGy as of radiation in 10 fractions.      He was then restarted on enzalutamide but a scan in December of 2019 showed worsening bone metastases so he was started on docetaxel in March of 2020. He was kept on this for approximately a year with his PSA slowly rising.   Cycle 15 1/28/2021.  After March 2021, he was admitted to a hospital for cardiac issues and then was lost to follow up with his previous oncologist.     Established care with Dr. Toledo  10/2021.    11/2021 He was initiated on Lupron every 3 months     He was referred to nuclear Medicine and treated with Xofigo from 11/4/2021 to 4/2022 5/2022 rechallenged with enzalutamide   8/2022 Xgeva was added for bone metastasis      2/2023  plans to start Pluvicto     May 18  2023-12/14/2023 Pluvicto   After  3 cycles PSA came down to 32        Interval History:    12/11/23 PSA 26.5    1/18/24 presented to hospital 2nd to urinary retention, constipation    1/18/24 CT L spine- Increased blastic prostate metastases compared to 2022.  No acute pathologic fracture or new secondary stenosis identified.      1/18/24  CT A/P  Findings of proctitis. Likely infectious/inflammatory or stercoral.     Inflammatory-type changes around the bladder. Limited evaluation of the bladder due to Moser catheter. Correlate clinically for cystitis.      Review of Systems   Constitutional:  Negative for appetite change, chills, diaphoresis, fatigue, fever and unexpected weight change.   HENT:   Negative for mouth sores, nosebleeds, sore throat, tinnitus and voice change.    Eyes:  Negative for eye problems.   Respiratory:  Negative for chest tightness, cough, shortness of breath and wheezing.    Cardiovascular:  Negative for chest pain, leg swelling and palpitations.   Gastrointestinal:  Negative for abdominal distention, abdominal pain, blood in stool, constipation, diarrhea, nausea, rectal pain and vomiting.   Endocrine: Negative for hot flashes.   Genitourinary: Negative.     Musculoskeletal:  Positive for arthralgias. Negative for gait problem and myalgias.   Skin:  Negative for itching and rash.   Neurological:  Negative for dizziness, gait problem, headaches, light-headedness and numbness.   Hematological:  Negative for adenopathy.   Psychiatric/Behavioral:  Negative for confusion and sleep disturbance. The patient is not nervous/anxious.         Test Results:        Labs:   Lab Results   Component Value Date    HGB 11.3 (L) 01/18/2024    HCT 35.2 (L) 01/18/2024    MCV 95 01/18/2024     01/18/2024    WBC 5.61 01/18/2024    NRBC 0 01/18/2024    BANDSPCT 2 10/12/2023     Lab Results   Component Value Date    K 3.5 03/06/2024     03/06/2024    CO2 23 03/06/2024    BUN 18 03/06/2024    CREATININE 0.92 03/06/2024    GLUF 96  "10/12/2023    CALCIUM 8.5 03/06/2024    CORRECTEDCA 8.2 (L) 04/10/2023    AST 13 03/06/2024    ALT 19 03/06/2024    ALKPHOS 71 03/06/2024    EGFR 79 03/06/2024           Imaging: No results found.          Allergies: No Known Allergies  Current Medications: Reviewed  PMH/FH/SH:  Reviewed      Physical Exam:    There is no height or weight on file to calculate BSA.    Ht Readings from Last 3 Encounters:   02/06/24 5' 7\" (1.702 m)   02/01/24 5' 7\" (1.702 m)   12/28/23 5' 7\" (1.702 m)        Wt Readings from Last 3 Encounters:   03/11/24 99.6 kg (219 lb 9.3 oz)   03/06/24 95.3 kg (210 lb)   02/08/24 100 kg (220 lb 14.4 oz)        Temp Readings from Last 3 Encounters:   03/11/24 (!) 96.4 °F (35.8 °C) (Temporal)   02/08/24 97.9 °F (36.6 °C) (Temporal)   01/18/24 98.2 °F (36.8 °C) (Oral)        BP Readings from Last 3 Encounters:   03/11/24 130/70   02/08/24 110/70   02/06/24 140/76             Physical Exam  Vitals reviewed.   Constitutional:       General: He is not in acute distress.     Appearance: He is well-developed. He is not diaphoretic.      Comments: Disheveled.   HENT:      Head: Normocephalic and atraumatic.   Eyes:      Conjunctiva/sclera: Conjunctivae normal.   Neck:      Trachea: No tracheal deviation.   Cardiovascular:      Rate and Rhythm: Normal rate and regular rhythm.      Heart sounds: No murmur heard.     No friction rub. No gallop.   Pulmonary:      Effort: Pulmonary effort is normal. No respiratory distress.      Breath sounds: Normal breath sounds. No wheezing or rales.   Chest:      Chest wall: No tenderness.   Abdominal:      General: There is no distension.      Palpations: Abdomen is soft.      Tenderness: There is no abdominal tenderness.   Musculoskeletal:      Cervical back: Normal range of motion and neck supple.      Comments: Rolling walker  Soft shoe right foot   Lymphadenopathy:      Cervical: No cervical adenopathy.   Skin:     General: Skin is warm and dry.      Coloration: Skin is " not pale.      Findings: No erythema.   Neurological:      Mental Status: He is alert and oriented to person, place, and time.   Psychiatric:         Behavior: Behavior normal.         Thought Content: Thought content normal.         Judgment: Judgment normal.         ECO      Emergency Contacts:    Extended Emergency Contact Information  Primary Emergency Contact: Rodrigue Rene  Mobile Phone: [phone]  Relation: Son  Secondary Emergency Contact: MonteiroCindy  Work Phone: [phone]  Relation:

## 2024-03-28 DIAGNOSIS — G89.29 CHRONIC MIDLINE LOW BACK PAIN: ICD-10-CM

## 2024-03-28 DIAGNOSIS — L08.9 DIABETIC FOOT INFECTION  (HCC): ICD-10-CM

## 2024-03-28 DIAGNOSIS — M54.50 CHRONIC MIDLINE LOW BACK PAIN: ICD-10-CM

## 2024-03-28 DIAGNOSIS — E11.628 DIABETIC FOOT INFECTION  (HCC): ICD-10-CM

## 2024-03-28 DIAGNOSIS — Z51.5 PALLIATIVE CARE PATIENT: ICD-10-CM

## 2024-03-28 RX ORDER — BLOOD SUGAR DIAGNOSTIC
STRIP MISCELLANEOUS
Qty: 100 EACH | Refills: 0 | Status: SHIPPED | OUTPATIENT
Start: 2024-03-28

## 2024-03-28 RX ORDER — METHOCARBAMOL 500 MG/1
500 TABLET, FILM COATED ORAL 3 TIMES DAILY
Qty: 90 TABLET | Refills: 0 | Status: SHIPPED | OUTPATIENT
Start: 2024-03-28

## 2024-03-28 RX ORDER — BLOOD SUGAR DIAGNOSTIC
STRIP MISCELLANEOUS
Qty: 100 EACH | OUTPATIENT
Start: 2024-03-28

## 2024-03-28 RX ORDER — LANCETS
EACH MISCELLANEOUS
Qty: 102 EACH | OUTPATIENT
Start: 2024-03-28

## 2024-03-28 NOTE — TELEPHONE ENCOUNTER
Will refill methocarbamol on behalf of Dr Andrew. Defer refill of diabetic supplies (ACCU-CHEK FASTCLIX LANCETS and ALCOHOL PADS 70 % PAD) to patient's PCP.    Thank you.

## 2024-04-03 ENCOUNTER — TELEPHONE (OUTPATIENT)
Dept: HEMATOLOGY ONCOLOGY | Facility: CLINIC | Age: 79
End: 2024-04-03

## 2024-04-03 NOTE — TELEPHONE ENCOUNTER
A voice message was left asking if [facility] is needed for the pet and Dr Hernandez f/u. He was asked to call the office with the information.

## 2024-04-03 NOTE — TELEPHONE ENCOUNTER
----- Message from Lisa Barnard PA-C sent at 4/2/2024  2:46 PM EDT -----  Rising PSA.  Dr. Hernandez recommends f/u pet/ct.  Mloly, please let patient know.    Marquita, please schedule with f/u with Dr. Hernandez thereafter

## 2024-04-03 NOTE — TELEPHONE ENCOUNTER
VM was left for son Rodrigue, asking for a call back to discuss his fathers care. Call back number provided.   Attempted to call patient no answer, unable to leave VM

## 2024-04-04 ENCOUNTER — TELEPHONE (OUTPATIENT)
Dept: HEMATOLOGY ONCOLOGY | Facility: CLINIC | Age: 79
End: 2024-04-04

## 2024-04-04 RX ORDER — LANCETS
EACH MISCELLANEOUS
Qty: 102 EACH | OUTPATIENT
Start: 2024-04-04

## 2024-04-04 RX ORDER — BLOOD SUGAR DIAGNOSTIC
STRIP MISCELLANEOUS
Qty: 100 EACH | OUTPATIENT
Start: 2024-04-04

## 2024-04-09 ENCOUNTER — TELEPHONE (OUTPATIENT)
Dept: HEMATOLOGY ONCOLOGY | Facility: CLINIC | Age: 79
End: 2024-04-09

## 2024-04-09 NOTE — TELEPHONE ENCOUNTER
Another phone call was placed to Royce, reached his voicemail again. Pt was requested to call the office to let us know if he would need [facility] for the PET/Ct and Dr Mary paulson

## 2024-04-10 ENCOUNTER — TELEPHONE (OUTPATIENT)
Dept: HEMATOLOGY ONCOLOGY | Facility: CLINIC | Age: 79
End: 2024-04-10

## 2024-04-10 NOTE — TELEPHONE ENCOUNTER
Called patient to confirm if he needed [facility] patient verbally agreed that he does need it for his Pet CT as well as Dr. Hernandez appointment.

## 2024-04-11 DIAGNOSIS — Z51.5 PALLIATIVE CARE PATIENT: ICD-10-CM

## 2024-04-11 DIAGNOSIS — M54.50 CHRONIC MIDLINE LOW BACK PAIN: ICD-10-CM

## 2024-04-11 DIAGNOSIS — G89.29 CHRONIC MIDLINE LOW BACK PAIN: ICD-10-CM

## 2024-04-11 DIAGNOSIS — E11.628 DIABETIC FOOT INFECTION  (HCC): ICD-10-CM

## 2024-04-11 DIAGNOSIS — L08.9 DIABETIC FOOT INFECTION  (HCC): ICD-10-CM

## 2024-04-11 RX ORDER — METHOCARBAMOL 500 MG/1
500 TABLET, FILM COATED ORAL 3 TIMES DAILY
Qty: 90 TABLET | Refills: 0 | OUTPATIENT
Start: 2024-04-11

## 2024-04-11 NOTE — TELEPHONE ENCOUNTER
Too soon to fill; pt's adherence to plan is not ideal.  Pt needs visit to discuss refill of this med.

## 2024-04-12 RX ORDER — BLOOD SUGAR DIAGNOSTIC
STRIP MISCELLANEOUS
Qty: 100 EACH | Refills: 2 | Status: SHIPPED | OUTPATIENT
Start: 2024-04-12

## 2024-04-15 ENCOUNTER — OFFICE VISIT (OUTPATIENT)
Dept: PALLIATIVE MEDICINE | Facility: CLINIC | Age: 79
End: 2024-04-15
Payer: COMMERCIAL

## 2024-04-15 VITALS
OXYGEN SATURATION: 95 % | TEMPERATURE: 96.4 F | SYSTOLIC BLOOD PRESSURE: 140 MMHG | HEART RATE: 99 BPM | BODY MASS INDEX: 34.29 KG/M2 | WEIGHT: 218.92 LBS | DIASTOLIC BLOOD PRESSURE: 76 MMHG

## 2024-04-15 DIAGNOSIS — I73.9 PAD (PERIPHERAL ARTERY DISEASE) (HCC): ICD-10-CM

## 2024-04-15 DIAGNOSIS — G89.3 CANCER-RELATED PAIN: Chronic | ICD-10-CM

## 2024-04-15 DIAGNOSIS — Z51.5 PALLIATIVE CARE PATIENT: ICD-10-CM

## 2024-04-15 DIAGNOSIS — F01.B0 MODERATE VASCULAR DEMENTIA WITHOUT BEHAVIORAL DISTURBANCE, PSYCHOTIC DISTURBANCE, MOOD DISTURBANCE, OR ANXIETY (HCC): ICD-10-CM

## 2024-04-15 PROCEDURE — 99213 OFFICE O/P EST LOW 20 MIN: CPT | Performed by: FAMILY MEDICINE

## 2024-04-15 PROCEDURE — G2211 COMPLEX E/M VISIT ADD ON: HCPCS | Performed by: FAMILY MEDICINE

## 2024-04-15 RX ORDER — OXYCODONE HYDROCHLORIDE 5 MG/1
5 TABLET ORAL EVERY 6 HOURS PRN
Qty: 120 TABLET | Refills: 0 | Status: SHIPPED | OUTPATIENT
Start: 2024-04-15

## 2024-04-15 RX ORDER — GABAPENTIN 300 MG/1
600 CAPSULE ORAL 2 TIMES DAILY
Qty: 120 CAPSULE | Refills: 11 | Status: SHIPPED | OUTPATIENT
Start: 2024-04-15

## 2024-04-15 NOTE — Clinical Note
Pt reports that he is running out of prednisone soon.  I see several refills available.  I will call pharmacy to confirm, but you may need to rewrite this one med.

## 2024-04-15 NOTE — PROGRESS NOTES
Outpatient Follow-Up - Palliative and Supportive Care   Royce Rene 78 y.o. male [id number]    1. PAD (peripheral artery disease) (HCC)  -     gabapentin (NEURONTIN) 300 mg capsule; Take 2 capsules (600 mg total) by mouth 2 (two) times a day    2. Cancer-related pain  -     oxyCODONE (ROXICODONE) 5 immediate release tablet; Take 1 tablet (5 mg total) by mouth every 6 (six) hours as needed (cancer pain) Max Daily Amount: 20 mg    3. Palliative care patient  -     oxyCODONE (ROXICODONE) 5 immediate release tablet; Take 1 tablet (5 mg total) by mouth every 6 (six) hours as needed (cancer pain) Max Daily Amount: 20 mg    4. Moderate vascular dementia without behavioral disturbance, psychotic disturbance, mood disturbance, or anxiety (HCC)      Medications Discontinued During This Encounter   Medication Reason    oxyCODONE (ROXICODONE) 5 immediate release tablet Reorder    gabapentin (NEURONTIN) 300 mg capsule Reorder     Narrative rationale for today's treatments:  - no changes to opioids nor other symptom meds  - Pt remains poorly aware of how to coordinate cares and prevent harms from his overall level of illness, such as injury to his foot.  - Pt demonstrates ongoing inability to coordinate cares, and to know medications.        Royce Rene was seen today for symptoms and planning cares related to above illnesses.  I have reviewed the patient's controlled substance dispensing history in the Prescription Drug Monitoring Program in compliance with the [facility] regulations before prescribing any controlled substances.    They are invited to continue to follow with us.  If there are questions or concerns, please contact us through our clinic/answering service 24 hours a day, seven days a week.    Gavin Andrew  [facility]  [phone]      Visit Information    Accompanied By: none    Source of History: Self    History Limitations: limited health literacy, dementia    Contacts: son Rodrigue Rene -  [phone]   Maricruz Steven  - [phone]    History of Present Illness      Royce Rene is a 78 y.o. male who presents in follow up of symptoms related to Stage IV prostate cancer, hormone treatment resistant.  He follows with Dr. Hernandez and Ms Philip for this.  There is also a significant cardiovascular history, with heart failure management by Dr. Trejo, and PAD with distal pain at rest in both feet.  He has an ICD for a h/o VT, and underwent TAVR in 6/2021.  Pertinent issues include: symptom management, resource management.      Since last visit, continues to have neuropathy with the R foot more than L.  Arrives to clinic wearing an open toed supportive medical sandal.    Reports severe L shoulder pain, with lancinating symptoms down the arm.  Her reports that his 'octygon' medication helps him with this pain.    He reports that he will run out of prednisone soon  This is Rxed by his Med/Onc team, as a necessary adjunct to his abiraterone/Zytiga.  A refill request is forwarded to their team, but the pt does appear to have refills available at pharmacy.    Pt reports that his son doesn't wish to pursue Waiver form as he does not wish to be beholden to his father for cares.    Pt has not accessed Podiatry referral we sent last month, as he didn't feel like they would do anything to help him.    Pt reports that he has a niece, and a granddaughter, who may be helpful to him.  He is not sure how to include them in his cares; granddaughter is in [place].  Niece lives near [place].  He might consider speaking with his niece to support him in day-to-day cares, or special trips for medical appointments.       In March, I had been informed by [employer] staff that the pt is indeed supervised and supported by his son, as much as the son appear to have the capacity between his jobs to help.  We do note that the patient has been making it to his scheduled infusions since our last visit, though he has missed an appt  with the treating provider, and this has not been rescheduled.    We do note that the [employer] worker has needed the pt to complete a FED assessment at [phone].  Unfortunately, this process was not completed as requested, and the case has been closed by the Agency.      In February, pt reports that no one in his life will help him.  He reports that he doesn't have prednisone at home, despite Dr. Hernandez has send 6mos of this med to pharmacy within last 2 mos.  Unclear if pt is having this med delivered; he will not ask his son for help, as son has two jobs.    Pt reports limited intake of solid foods d/t edentulous status.  He has no dentist to help him prepare dentures.    He reports forgetting several pills, carmen his midday doses.  He again reports ongoing memory challenges, of a general nature, that are worsening over time.  He reports feeling overwhelmed by the complexity of his cares, and he has lost several contact cards for his providers and assistants.    Calls to pharmacy show that prednisone is continuing to be including in his bubblepak at doses written by Dr. Hernandez, and delivered to the home with his other meds.      We have been informed that pt's son will not answer phone before 2pm daily.  Pt's daughter Kush has never been helpful in care coordination in our experience, reportedly, she has singed off his case for any contact or care..    - Vision challenge -  He has fogginess in the far vision, and cannot read effectively.  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    - Heart failure - Advised by Cards to use 2g Na / 2L H2O diet, as well as daily wts  - Vascular health - 4/26/23, pt saw Dr. Abbasi in office, and it seems that the plan ongoing is for DAPT life-long, and deferral of further cares to Podiatry.  Pt has not seen Podiatry since 3/28/23, as of 11/28/2023.  - Care coordination - [facility] needs [facility] [place] to continue  cares with Onc, Palliative, and any other providers in Network.  Pt completely unable to manage this issue on his own; is unaware of his PCP, nor the systems involved in [facility]'s gatekeeping to specialty care / 's Choice program.   - Oncology Care - Dr. Hernandez has been effectively managing pt's medical illness, as noted by sustained suppression of his PSA on repeat measurements on current therapies.  Ms Keating of [facility] has been helpful in some degree of care coordination re: pt's financial difficulties    - Vulnerable adult - He has had [facility] set up, but he reports [facility] would not take him home from treatments and tests.  Has Meals on Wheels weekdaily for midday meal only.  He can prep breakfast, but not any other meals.    Pt's son Rodrigue presented with him on 3/30/2023, and states that there are no waiver services set up, there are no formal mohr of atty, and that Rodrigue himself cannot be relied upon for transport, coordination, or medical supervision at this time.    If waiver services could be gained, Rodrigue would wish to be the beneficiary of caregiver hours.  We discussed today that it would then be best to have daughter Kush have both medical and financial mohr of atty.  However, Kush has expressed a desire never to be involved in her father's cares.  Pt's last son Royce Bermeo lives in [place], and has not expressed any interest in providing assistance nor support.    Pt reports that he is not eating well because he cannot cook for himself.  On 2/8/2024, he notes that he is mostly eatign liquids and soft meals, as he has no good teeth, and he has no dentist to help him set up dentures.  Son will occ grocery shop, but does not prep meals.    Pt pays mortgage on his home, and has not added anyone to the deed/title.  He reports that this is due to GI bill provisions, and since Alec is not a , he cannot have the home through this mortgage.    He does not know how to get care thru VA system,  "though one of his [facility] provider told him he would need to see a specialist.   He also endorses losing items about his home, and a concern about his memory.  Chores about the house are nearly impossible because he cannot bend over, and he can't stand for more than about 3min at a time.    - Home DME, supports -    = Pt has stairglide/chairlift in home.  Confirmed that this was resolved and improved by home health RN 3/30/23.   = Transport - Relies on [facility] for all support.  Family unhelpful.  Pt's 's license revoked by State, pending medical evaluation and re-testing.      From our first visit on 3/1/22:    \"Pt has demonstrated -- over a year ago now -- progression of his illness (rising PSA and decreasing functional status) despite aggressive therapies and cytotoxic treatments.  His side effects vs decompensation in his overall illness was so severe he was admitted to [facility] in march 2021.  Since that time, he has recovered some function with careful management of his cardiac disease, and less aggressive, less toxic cancer cares.  At this time, his treatment plan involves Xofigo for bone-avid disease, +/- Lupron.  We note that the patient has demonstrated some disease progression on other hormonal blockers, but that this plan of care is being offered, if not rec'd, by Dr. Toledo.    Pt presents to our office with suboptimally controlled pain, ongoing wt loss, poor appetite, and general malaise.  His concerns are more related to his back and R foot pain; a Podiatrist today advised him that his unilateral neuropathic symptoms 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.       Today, he can't quite recall his medications, he just insists that he needs 'something stronger than tynlenol'  When we pointed out that he has used tramadol and oxyIR recently, he also stated that these medications were not helpful.      Pt endorses support from his son, who will drive pt to appts and on other errands.   Pt " "can't name his meds, but has some vague understanding of the indications for his meds.\"    Past medical, surgical, social, and family histories are reviewed and pertinent updates are made.    Review of Systems   Unable to perform ROS: Dementia         Vital Signs    /76 (BP Location: Left arm, Patient Position: Sitting, Cuff Size: Standard)   Pulse 99   Temp (!) 96.4 °F (35.8 °C) (Temporal)   Wt 99.3 kg (218 lb 14.7 oz)   SpO2 95%   BMI 34.29 kg/m²     Physical Exam and Objective Data  Physical Exam  Constitutional:       General: He is not in acute distress.     Appearance: He is not diaphoretic.      Comments: Frail   HENT:      Head: Normocephalic and atraumatic.      Right Ear: External ear normal.      Left Ear: External ear normal.   Eyes:      General:         Right eye: No discharge.         Left eye: No discharge.      Conjunctiva/sclera: Conjunctivae normal.      Pupils: Pupils are equal, round, and reactive to light.   Neck:      Trachea: No tracheal deviation.   Cardiovascular:      Rate and Rhythm: Regular rhythm. Tachycardia present.   Pulmonary:      Effort: Pulmonary effort is normal. No respiratory distress.      Breath sounds: No stridor.   Abdominal:      Palpations: Abdomen is soft.   Skin:     General: Skin is warm and dry.      Findings: No erythema or rash.   Neurological:      General: No focal deficit present.      Mental Status: Mental status is at baseline. He is disoriented.   Psychiatric:      Comments: Judgment and insight poor       Radiology and Laboratory:  I personally reviewed and interpreted the following results: none new    I have spent a total time of 25+ minutes on 4/15/24  in caring for this patient including chart review; symptom pursuit; supportive listening; anticipatory guidance. review and assessment of decisional apparatus and capacity, applicable legal codes and extant documents;   "

## 2024-04-18 DIAGNOSIS — G89.29 CHRONIC MIDLINE LOW BACK PAIN: ICD-10-CM

## 2024-04-18 DIAGNOSIS — E11.9 DIABETES MELLITUS (HCC): ICD-10-CM

## 2024-04-18 DIAGNOSIS — M54.50 CHRONIC MIDLINE LOW BACK PAIN: ICD-10-CM

## 2024-04-18 RX ORDER — METHOCARBAMOL 500 MG/1
500 TABLET, FILM COATED ORAL 3 TIMES DAILY
Qty: 90 TABLET | Refills: 0 | Status: SHIPPED | OUTPATIENT
Start: 2024-04-18

## 2024-04-22 RX ORDER — BLOOD-GLUCOSE METER
EACH MISCELLANEOUS
Qty: 1 KIT | Refills: 1 | Status: SHIPPED | OUTPATIENT
Start: 2024-04-22

## 2024-04-23 DIAGNOSIS — E87.6 HYPOKALEMIA: ICD-10-CM

## 2024-04-23 RX ORDER — POTASSIUM CHLORIDE 20 MEQ/1
20 TABLET, EXTENDED RELEASE ORAL DAILY
Qty: 30 TABLET | Refills: 5 | Status: SHIPPED | OUTPATIENT
Start: 2024-04-23 | End: 2024-10-20

## 2024-04-24 ENCOUNTER — TELEPHONE (OUTPATIENT)
Dept: HEMATOLOGY ONCOLOGY | Facility: CLINIC | Age: 79
End: 2024-04-24

## 2024-04-24 NOTE — TELEPHONE ENCOUNTER
Patient calling to confirm transportation for tomorrows pet scan appointment.  I informed him he is scheduled with [facility] for .

## 2024-04-25 ENCOUNTER — HOSPITAL ENCOUNTER (OUTPATIENT)
Dept: RADIOLOGY | Age: 79
Discharge: HOME | End: 2024-04-25
Payer: COMMERCIAL

## 2024-04-25 DIAGNOSIS — R97.21 RISING PSA FOLLOWING TREATMENT FOR MALIGNANT NEOPLASM OF PROSTATE: ICD-10-CM

## 2024-04-25 DIAGNOSIS — C61 PROSTATE CANCER (HCC): ICD-10-CM

## 2024-04-25 DIAGNOSIS — C79.51 MALIGNANT NEOPLASM METASTATIC TO BONE (HCC): ICD-10-CM

## 2024-04-25 PROCEDURE — A9595 HB PIFLUFOLASTAT F-18, DIAGNOSTIC, 1 MILLICURIE: HCPCS

## 2024-04-25 PROCEDURE — 78815 PET IMAGE W/CT SKULL-THIGH: CPT

## 2024-04-26 ENCOUNTER — TELEPHONE (OUTPATIENT)
Dept: HEMATOLOGY ONCOLOGY | Facility: CLINIC | Age: 79
End: 2024-04-26

## 2024-04-26 DIAGNOSIS — C61 PROSTATE CANCER (HCC): Primary | ICD-10-CM

## 2024-04-26 DIAGNOSIS — C61 PROSTATE CANCER (HCC): ICD-10-CM

## 2024-04-26 DIAGNOSIS — C79.31 METASTASIS TO BRAIN (HCC): Primary | ICD-10-CM

## 2024-04-26 DIAGNOSIS — C79.31 METASTASIS TO BRAIN (HCC): ICD-10-CM

## 2024-04-26 RX ORDER — PREDNISONE 5 MG/1
5 TABLET ORAL 2 TIMES DAILY WITH MEALS
Qty: 60 TABLET | Refills: 5 | Status: SHIPPED | OUTPATIENT
Start: 2024-04-26

## 2024-04-26 NOTE — PROGRESS NOTES
Patient denied metal in his body when I spoke with him, when MRI scheduled, he reported pacemaker.      Will order CT head.  At this time, will leave MRI scheduled (pacemaker could be turned off) in case needed.

## 2024-04-26 NOTE — TELEPHONE ENCOUNTER
Called patient re: pet/ct.  He answered # on file.  Reviewed there are 2 new small foci of PSMA activity in the right cerebellum that could be further evaluated with MRI of the brain.    Reviewed with patient.  States he has no metal in his body, is not claustrophobic and would be agreeable to an MRI.

## 2024-05-02 ENCOUNTER — HOSPITAL ENCOUNTER (OUTPATIENT)
Dept: CT IMAGING | Facility: HOSPITAL | Age: 79
Discharge: HOME | End: 2024-05-02
Payer: COMMERCIAL

## 2024-05-02 DIAGNOSIS — C79.31 METASTASIS TO BRAIN (HCC): ICD-10-CM

## 2024-05-02 DIAGNOSIS — C61 PROSTATE CANCER (HCC): ICD-10-CM

## 2024-05-02 PROCEDURE — 70470 CT HEAD/BRAIN W/O & W/DYE: CPT

## 2024-05-02 RX ADMIN — IOHEXOL 85 ML: 350 INJECTION, SOLUTION INTRAVENOUS at 10:16

## 2024-05-06 ENCOUNTER — TELEPHONE (OUTPATIENT)
Dept: HEMATOLOGY ONCOLOGY | Facility: CLINIC | Age: 79
End: 2024-05-06

## 2024-05-07 ENCOUNTER — TELEPHONE (OUTPATIENT)
Dept: HEMATOLOGY ONCOLOGY | Facility: CLINIC | Age: 79
End: 2024-05-07

## 2024-05-07 NOTE — TELEPHONE ENCOUNTER
"Significant findings CT head.   \"IMPRESSION:     Questionable enhancing lesion in the right anterior frontal lobe on series 303, image 39 without vasogenic edema.     Foci of calcification within the right parietal lobe and cerebellum are stable since 4/3/2023 and new since 3/31/2019. Differential diagnosis should include treated metastatic disease, small cavernomas or areas of dystrophic calcification. Otherwise no   convincing enhancing metastatic lesions within the right cerebellum to correspond to findings on recent PET/CT.     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 No definite new sclerotic metastatic lesions identified.     If contrast enhanced MRI of the brain cannot be obtained short-term interval follow-up is recommended for surveillance.     The study was marked in EPIC for significant notification.\"  "

## 2024-05-09 ENCOUNTER — OFFICE VISIT (OUTPATIENT)
Dept: HEMATOLOGY ONCOLOGY | Facility: CLINIC | Age: 79
End: 2024-05-09
Payer: COMMERCIAL

## 2024-05-09 VITALS
HEIGHT: 67 IN | SYSTOLIC BLOOD PRESSURE: 118 MMHG | BODY MASS INDEX: 34.14 KG/M2 | OXYGEN SATURATION: 97 % | WEIGHT: 217.5 LBS | TEMPERATURE: 96.2 F | DIASTOLIC BLOOD PRESSURE: 70 MMHG | HEART RATE: 85 BPM | RESPIRATION RATE: 17 BRPM

## 2024-05-09 DIAGNOSIS — M54.50 CHRONIC MIDLINE LOW BACK PAIN: ICD-10-CM

## 2024-05-09 DIAGNOSIS — C61 PROSTATE CANCER (HCC): Primary | ICD-10-CM

## 2024-05-09 DIAGNOSIS — G89.29 CHRONIC MIDLINE LOW BACK PAIN: ICD-10-CM

## 2024-05-09 PROCEDURE — 99214 OFFICE O/P EST MOD 30 MIN: CPT | Performed by: INTERNAL MEDICINE

## 2024-05-09 RX ORDER — METHOCARBAMOL 500 MG/1
500 TABLET, FILM COATED ORAL 3 TIMES DAILY
Qty: 90 TABLET | Refills: 0 | Status: SHIPPED | OUTPATIENT
Start: 2024-05-09

## 2024-05-09 NOTE — PROGRESS NOTES
Hematology Outpatient Follow - Up Note  Royce Rene 78 y.o. male MRN: @ Encounter: [id number]        Date:  5/9/2024        Assessment/ Plan:    Heavily pretreated metastatic prostate Cancer [facility]. transferred [facility] to [facility] in October 2021.       History of [place] 7 prostate cancer s/p treatment with external beam radiation 7200cGy from 10/14/2003 to 12/11/2003.   He also got neoadjuvant and adjuvant LHRH for 9 months after radiation.      July 2008 with a rising PSA and bony metastasis identified.  initiated on Lupron      PSA was increasing and bone scan identified new lesions  3/13/2015 he was initiated on abiraterone        6/2017 progression of bony metastatic disease on imaging.  Treatment changed to enzalutamide.       2/2019 palliative Radiation to sacrum     March 2020 - March 2021 Docetaxel.       3/2021 - 10/2021 no cancer directed treatment as he had significant cardiac issues and was seen in [place].     10[facility]     Lupron every 3 months     Xofigo from 11/4/2021 to 4/2022 5/2022 rechallenged with enzalutamide   8/2022 Xgeva was added for bone metastasis     May 13, 2023  (Pluvicto) first dose    PSA came down significantly after 3 cycles to 37 7/10/23 (245 5/15/23)     6/2023 abiraterone + prednisone also initiated.    10/12/23 PSA 24      12/14/2023 Dose #6      12/15/23- PLUVICTO THERAPY SPECT CT   Decreased radiotracer uptake in previously seen osseous lesions compatible with therapy response.      Patient continues on abiraterone + prednisone;  Lupron & Xgeva q 3 months.    PSMA scan on 4/2024 showed decreased activity in multiple bony lesions    CAT scan of the brain showed questionable lesion in the right frontal lobe without associating edema, I could not do an MRI because of the pacer, will repeat CAT scan of the brain in 2 months    Need  evaluation and most likely he is not compliant with his  medication and no help at home        Labs and imaging studies are reviewed by ordering provider once results are available. If there are findings that need immediate attention, you will be contacted when results available.   Discussing results and the implication on your healthcare is best discussed in person at your follow-up visit.       HPI:  Royce Rene is a 78 year old male seen initially by Dr. Toledo October 5, 2021 regarding metastatic castration resistant prostate cancer.     He has multiple medical conditions including a significant cardiovascular history, heart failure, PAD.  [facility]       [facility] he was initially diagnosed with a [place] 7 prostate cancer s/p treatment with external beam radiation 7200cGy from 10/14/2003 to 12/11/2003.   He also got neoadjuvant and adjuvant LHRH for 9 months after radiation.       He was observed until July of 2008 when he was noted to have rising PSA and imaging done at that time showed new bony lesions.    He received Zoladex and later leuprolide.       PSA was increasing and bone scan identified new lesions.  3/13/2015 he was initiated on abiraterone     6/2017 He had evidence of aggressive bony metastatic disease on imaging.  Treatment changed to Enzalutamide.       His PSA continued to rise and he had progressive bony metastases including to the sacrum for which he received a course of palliative radiation, which was completed in February of 2019.  It was 3000 cGy as of radiation in 10 fractions.      He was then restarted on enzalutamide but a scan in December of 2019 showed worsening bone metastases so he was started on docetaxel in March of 2020. He was kept on this for approximately a year with his PSA slowly rising.   Cycle 15 1/28/2021.  After March 2021, he was admitted to a hospital for cardiac issues and then was lost to follow up with his previous oncologist.     Established care with Dr. Toledo  10/2021.     11/2021 He was initiated on Lupron every 3 months     He was referred to nuclear Medicine and treated with Xofigo from 11/4/2021 to 4/2022 5/2022 rechallenged with enzalutamide   8/2022 Xgeva was added for bone metastasis   May 18  2023-12/14/2023 Pluvicto   After 3 cycles PSA came down to 32        PSMA scan on 4/25/2024 showed significant decrease in the bony metastasis activity, persistent mild activity in the left aspect of the prostate gland, small foci of mild PSMA activity in the right cerebellum, he could not do the MRI of the brain so CAT scan showed stable sclerotic metastatic lesion within the right occipital calvarium lesion in the right anterior frontal lobe.  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 COMPARISON:  None. TECHNIQUE:  CT examination of the brain was performed both prior to and after the administration of intravenous contrast.  Multiplanar 2D reformatted images were created from the source data. Radiation dose length product (DLP) for this visit:  2157.62 mGy-cm .  This examination, like all CT scans performed in the [facility], was performed utilizing techniques to minimize radiation dose exposure, including the use of iterative reconstruction and automated exposure control. IV Contrast:  85 mL of iohexol (OMNIPAQUE) IMAGE QUALITY:  Diagnostic. FINDINGS: PARENCHYMA:  No intracranial mass, mass effect or midline shift. No CT signs of acute infarction.  No acute parenchymal hemorrhage. There is a questionable enhancing lesion within the right anterior frontal lobe on series 303, image 39. There is no vasogenic edema in this region. This measures 7 x 6 mm. There are small foci of calcification within the right  parietal lobe, right cerebellum as well as left parietal region, stable since 4/3/2023 and appears new since 3/31/2019 in the right parietal lobe and cerebellum. There is a chronic right cerebellar lacunar infarct, stable. VENTRICLES AND EXTRA-AXIAL SPACES:  Normal for patient's age. VISUALIZED ORBITS: Normal visualized orbits. PARANASAL SINUSES: Normal visualized paranasal sinuses. CALVARIUM: Stable right occipital sclerotic lesion, likely representing treated metastatic disease. No definite new sclerotic lesions identified.     Impression: Questionable enhancing lesion in the right anterior frontal lobe on series 303, image 39 without vasogenic edema. Foci of calcification within the right parietal lobe and cerebellum are stable since 4/3/2023 and new since 3/31/2019. Differential diagnosis should include treated metastatic disease, small cavernomas or areas of dystrophic calcification. Otherwise no convincing enhancing metastatic lesions within the right cerebellum to correspond to findings on recent PET/CT. 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 No definite new sclerotic metastatic lesions identified. If contrast enhanced MRI of the brain cannot be obtained short-term interval follow-up is recommended for surveillance. The study was marked in EPIC for significant notification. Workstation performed: [id number]     NM PET CT skull base to mid thigh    Result Date: 4/25/2024  Narrative: PET PSMA PET/CT SCAN INDICATION:  C79.51: Secondary malignant neoplasm of bone C61: Malignant neoplasm of prostate R97.21: Rising PSA following treatment for malignant neoplasm of prostate.   Reviewed hematology-oncology note 3/27/2024: History of prostate cancer status post external beam radiation in 2003 followed by androgen deprivation therapy. Bony metastases were discovered 7/2008 after which he received chemotherapy, palliative radiation, and more recently Xofigo (2022) and Pluvicto (2023).  PSA is now rising. MODIFIER: PS COMPARISON: PSMA PET/CT 7/25/2022, CT abdomen pelvis 1/18/2024 CELL TYPE:  prostatic adenocarcinoma jag 7 (bx: 2003 prostate +) TECHNIQUE:   9.8 mCi F-18 Pylarify PSMA administered IV. Multiplanar attenuation corrected and non attenuation corrected PET images were acquired 60 minutes post injection. Contiguous, low dose, axial CT sections were obtained from the vertex through the  femurs.  Intravenous or oral contrast was not utilized.  This examination, like all CT scans performed in the [facility], was performed utilizing techniques to minimize radiation dose exposure, including the use of iterative reconstruction and automated exposure control. FINDINGS: VISUALIZED BRAIN: There are 2 subcentimeter foci of mild PSMA activity in the right posterior fossa on image 44, SUV max 1.6. New since prior. Prior study demonstrated increased activity along the anterior falx, now barely visible on image 24, prior SUV max was 1.1 now 0.13.. HEAD/NECK: No PSMA avid cervical adenopathy is seen. CT images: Unremarkable. CHEST: No PSMA avid soft tissue lesions are seen. CT images: Right chest port terminates near the cavoatrial junction. Left chest AICD/pacemaker. Aortic valve replacement. Cardiomegaly. Coronary artery stents. ABDOMEN: No PSMA avid soft tissue lesions are seen. CT images: Calcified splenic granulomata. Mild hepatic steatosis PELVIS: PSMA activity in the central/left gland with SUV max 3, previously 4.5. CT images: Unremarkable. OSSEOUS STRUCTURES: Numerous osseous metastatic lesions. Overall these are more sclerotic on CT and significantly decreased in PSMA activity with many now at background activity. Reference lesions as follows: -T1 left transverse process with SUV max 4, previously 25 -Right superior scapula with SUV max 1.6 (similar to background), previously 8.7 -T2 vertebral body with SUV max 1.3 (similar to background), previously 13 -Left lateral sixth  "rib with SUV max 0.75 (similar to background), previously 9.6 -T11 vertebral body with SUV max 1.2 (lower to background), previously 20.2 -Right ilium with SUV max 4.6, previously 14.4 -Right ischial tuberosity with SUV max 3.9, previously 9.6 New activity is seen involving the anterior right 6th rib, SUV max 3.0.     Impression: 1. Decreased PSMA activity of numerous osseous metastatic lesions, many of which are similar to background activity. Solitary area of new activity seen involving the anterior right 6th rib with SUV max 3.0 2. Persistent mild activity within the central and left aspect of the prostate gland, SUV max currently 3.0 and earlier 4.5. 3. Two new small foci of mild PSMA activity are seen in the right cerebellum. This may be further evaluated with MRI of the brain Resident: MARIE BRYANT I, the attending radiologist, have reviewed the images and agree with the final report above. Workstation performed: [id number]       Labs:   Lab Results   Component Value Date    WBC 7.91 03/27/2024    HGB 11.5 (L) 03/27/2024    HCT 36.8 03/27/2024    MCV 94 03/27/2024     03/27/2024     Lab Results   Component Value Date    K 4.3 03/27/2024     03/27/2024    CO2 23 03/27/2024    BUN 18 03/27/2024    CREATININE 0.92 03/27/2024    GLUF 96 10/12/2023    CALCIUM 8.8 03/27/2024    CORRECTEDCA 8.2 (L) 04/10/2023    AST 9 (L) 03/27/2024    ALT 11 03/27/2024    ALKPHOS 80 03/27/2024    EGFR 79 03/27/2024       No results found for: \"IRON\", \"TIBC\", \"FERRITIN\"    No results found for: \"HXKKIINR34\"      ROS: Review of Systems   Constitutional:  Positive for appetite change and unexpected weight change. Negative for chills, diaphoresis, fatigue and fever.   HENT:   Negative for hearing loss, lump/mass, mouth sores, nosebleeds, sore throat, trouble swallowing and voice change.    Eyes: Negative.  Negative for eye problems and icterus.   Respiratory:  Positive for shortness of breath. Negative for chest " tightness, cough and hemoptysis.    Cardiovascular:  Negative for chest pain and leg swelling.   Gastrointestinal:  Positive for diarrhea (Improved on lenalidomide dose reduction). Negative for abdominal distention, abdominal pain, blood in stool, constipation and nausea.   Endocrine: Negative.    Genitourinary:  Negative for dysuria, frequency, hematuria and pelvic pain.    Musculoskeletal:  Positive for arthralgias. Negative for back pain, flank pain, gait problem, myalgias and neck stiffness.   Skin:  Negative for itching and rash.   Neurological:  Negative for dizziness, gait problem, headaches, light-headedness, numbness and speech difficulty.   Hematological:  Negative for adenopathy. Does not bruise/bleed easily.   Psychiatric/Behavioral:  Positive for confusion. Negative for decreased concentration, depression and sleep disturbance. The patient is nervous/anxious.           Current Medications: Reviewed  Allergies: Reviewed  PMH/FH/SH:  Reviewed      Physical Exam:    Body surface area is 2.1 meters squared.    Wt Readings from Last 3 Encounters:   05/09/24 98.7 kg (217 lb 8 oz)   04/15/24 99.3 kg (218 lb 14.7 oz)   03/11/24 99.6 kg (219 lb 9.3 oz)        Temp Readings from Last 3 Encounters:   05/09/24 (!) 96.2 °F (35.7 °C) (Temporal)   04/15/24 (!) 96.4 °F (35.8 °C) (Temporal)   03/27/24 97.6 °F (36.4 °C)        BP Readings from Last 3 Encounters:   05/09/24 118/70   04/15/24 140/76   03/27/24 138/80         Pulse Readings from Last 3 Encounters:   05/09/24 85   04/15/24 99   03/27/24 79        Physical Exam  Vitals reviewed.   Constitutional:       General: He is not in acute distress.     Appearance: He is well-developed. He is not diaphoretic.   HENT:      Head: Normocephalic and atraumatic.   Eyes:      Conjunctiva/sclera: Conjunctivae normal.   Neck:      Trachea: No tracheal deviation.   Cardiovascular:      Rate and Rhythm: Normal rate and regular rhythm.      Heart sounds: No murmur heard.     No  friction rub. No gallop.   Pulmonary:      Effort: Pulmonary effort is normal. No respiratory distress.      Breath sounds: Normal breath sounds. No wheezing or rales.   Chest:      Chest wall: No tenderness.   Abdominal:      General: There is no distension.      Palpations: Abdomen is soft.      Tenderness: There is no abdominal tenderness.   Musculoskeletal:      Cervical back: Normal range of motion and neck supple.      Right lower leg: Edema present.      Left lower leg: Edema present.   Lymphadenopathy:      Cervical: No cervical adenopathy.   Skin:     General: Skin is warm and dry.      Coloration: Skin is not pale.      Findings: Lesion present. No erythema.   Neurological:      Mental Status: He is alert. Mental status is at baseline.      Motor: Weakness present.      Gait: Gait abnormal.      Deep Tendon Reflexes: Reflexes abnormal.   Psychiatric:         Behavior: Behavior normal.         Thought Content: Thought content normal.         ECO    Goals and Barriers:  Current Goal: Minimize effects of disease.   Barriers: None.      Patient's Capacity to Self Care:  Patient is able to self care.    Code Status: @[facility]@

## 2024-05-13 ENCOUNTER — OFFICE VISIT (OUTPATIENT)
Dept: PALLIATIVE MEDICINE | Facility: CLINIC | Age: 79
End: 2024-05-13

## 2024-05-13 VITALS
SYSTOLIC BLOOD PRESSURE: 126 MMHG | DIASTOLIC BLOOD PRESSURE: 74 MMHG | OXYGEN SATURATION: 97 % | BODY MASS INDEX: 34.18 KG/M2 | WEIGHT: 218.26 LBS | TEMPERATURE: 96.5 F | HEART RATE: 91 BPM

## 2024-05-13 DIAGNOSIS — K59.03 THERAPEUTIC OPIOID-INDUCED CONSTIPATION (OIC): Primary | ICD-10-CM

## 2024-05-13 DIAGNOSIS — I73.9 PAD (PERIPHERAL ARTERY DISEASE) (HCC): ICD-10-CM

## 2024-05-13 DIAGNOSIS — L60.3 ONYCHODYSTROPHY: ICD-10-CM

## 2024-05-13 DIAGNOSIS — T40.2X5A THERAPEUTIC OPIOID-INDUCED CONSTIPATION (OIC): Primary | ICD-10-CM

## 2024-05-13 DIAGNOSIS — G62.9 PERIPHERAL NEUROPATHY: Chronic | ICD-10-CM

## 2024-05-13 DIAGNOSIS — Z51.5 PALLIATIVE CARE PATIENT: ICD-10-CM

## 2024-05-13 DIAGNOSIS — G89.3 CANCER-RELATED PAIN: Chronic | ICD-10-CM

## 2024-05-13 RX ORDER — POLYETHYLENE GLYCOL 3350 17 G/17G
17 POWDER, FOR SOLUTION ORAL EVERY OTHER DAY
Qty: 510 G | Refills: 0 | Status: SHIPPED | OUTPATIENT
Start: 2024-05-13

## 2024-05-13 RX ORDER — OXYCODONE HYDROCHLORIDE 5 MG/1
5 TABLET ORAL EVERY 6 HOURS PRN
Qty: 120 TABLET | Refills: 0 | Status: SHIPPED | OUTPATIENT
Start: 2024-05-13

## 2024-05-13 NOTE — PROGRESS NOTES
Outpatient Follow-Up - Palliative and Supportive Care   Royce Rene 78 y.o. male [id number]    1. Therapeutic opioid-induced constipation (OIC)  -     polyethylene glycol (GLYCOLAX) 17 GM/SCOOP powder; Take 17 g by mouth every other day Monday Wednesday Friday    2. Cancer-related pain  -     oxyCODONE (ROXICODONE) 5 immediate release tablet; Take 1 tablet (5 mg total) by mouth every 6 (six) hours as needed (cancer pain) Max Daily Amount: 20 mg    3. Palliative care patient  -     oxyCODONE (ROXICODONE) 5 immediate release tablet; Take 1 tablet (5 mg total) by mouth every 6 (six) hours as needed (cancer pain) Max Daily Amount: 20 mg    4. PAD (peripheral artery disease) (HCC)  -     Ambulatory Referral to Podiatry; Future    5. Peripheral neuropathy  -     Ambulatory Referral to Podiatry; Future    6. Onychodystrophy  -     Ambulatory Referral to Podiatry; Future        Medications Discontinued During This Encounter   Medication Reason    oxyCODONE (ROXICODONE) 5 immediate release tablet Reorder       Narrative rationale for today's treatments:  - No changes to opioids nor other symptom meds  - Pt remains poorly aware of how to coordinate cares and prevent harms from his overall level of illness, such as injury to his foot.   - Podiatry referral scheduled for patient prior to leaving office.  - Pt demonstrates ongoing inability to coordinate cares, and to know medications.      Royce Rene was seen today for symptoms and planning cares related to above illnesses.  I have reviewed the patient's controlled substance dispensing history in the Prescription Drug Monitoring Program in compliance with the [place] regulations before prescribing any controlled substances.    They are invited to continue to follow with us.  If there are questions or concerns, please contact us through our clinic/answering service 24 hours a day, seven days a week.    Gavin Andrew  [facility] Palliative and Supportive Care  [phone]      Visit  Information    Accompanied By: none    Source of History: Self    History Limitations: limited health literacy, dementia    Contacts: son Rodrigue Rene - [phone]   Cindy Monteiro Ctjacob  - [phone]    History of Present Illness      Royce Rene is a 78 y.o. male who presents in follow up of symptoms related to Stage IV prostate cancer, hormone treatment resistant.  He follows with Dr. Hernandez and Ms Philip for this.  There is also a significant cardiovascular history, with heart failure management by Dr. Trejo, and PAD with distal pain at rest in both feet.  He has an ICD for a h/o VT, and underwent TAVR in 6/2021.  Pertinent issues include: symptom management, resource management.      Since last visit, pt endorses a terrible fall while trying to change the garbage.  He reports that he laid on floor for several moments, unable to call his son, and he has worse pains after the fall.  No LOC.      Pt reports that he continues to have safety issues with his rollator not actually being safe to sit on, and so instead he has a stool or office chair with wheels that he will sit on an pulls himself about with his heels throughout the home.      Again today pt is wearing an open-toed medical sandal.  The R foot shows new injuries, with a scaly wound on the top of the hallux.      In April 2024, pt reports that his son doesn't wish to pursue Waiver form as he does not wish to be beholden to his father for cares.    Pt has not accessed Podiatry referral we sent last month, as he didn't feel like they would do anything to help him.    Pt reports that he has a niece, and a granddaughter, who may be helpful to him.  He is not sure how to include them in his cares; granddaughter is in [place].  Niece lives near [place].  He might consider speaking with his niece to support him in day-to-day cares, or special trips for medical appointments.       In March, I had been informed by [employer] staff that the pt is indeed supervised  and supported by his son, as much as the son appear to have the capacity between his jobs to help.  We do note that the patient has been making it to his scheduled infusions since our last visit, though he has missed an appt with the treating provider, and this has not been rescheduled.    We do note that the [employer] worker has needed the pt to complete a FED assessment at [phone].  Unfortunately, this process was not completed as requested, and the case has been closed by the Agency.      In February, pt reports that no one in his life will help him.  He reports that he doesn't have prednisone at home, despite Dr. Hernandez has send 6mos of this med to pharmacy within last 2 mos.  Unclear if pt is having this med delivered; he will not ask his son for help, as son has two jobs.    Pt reports limited intake of solid foods d/t edentulous status.  He has no dentist to help him prepare dentures.    He reports forgetting several pills, carmen his midday doses.  He again reports ongoing memory challenges, of a general nature, that are worsening over time.  He reports feeling overwhelmed by the complexity of his cares, and he has lost several contact cards for his providers and assistants.    Calls to pharmacy show that prednisone is continuing to be including in his bubblepak at doses written by Dr. Hernandez, and delivered to the home with his other meds.      We have been informed that pt's son will not answer phone before 2pm daily.  Pt's daughter Kush has never been helpful in care coordination in our experience, reportedly, she has singed off his case for any contact or care..    - Vision challenge -  He has fogginess in the far vision, and cannot read effectively.  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    - Heart failure - Advised by Cards to use 2g Na / 2L H2O diet, as well as daily wts  - Vascular health - 4/26/23, pt saw Dr. Abbasi in office, and it seems  that the plan ongoing is for DAPT life-long, and deferral of further cares to Podiatry.  Pt has not seen Podiatry since 3/28/23, as of 11/28/2023.  - Care coordination - Pt needs [facility] from [facility] [place] to continue cares with Onc, Palliative, and any other providers in Network.  Pt completely unable to manage this issue on his own; is unaware of his PCP, nor the systems involved in [facility]'s gatekeeping to specialty care / 's Choice program.   - Oncology Care - Dr. Hernandez has been effectively managing pt's medical illness, as noted by sustained suppression of his PSA on repeat measurements on current therapies.  Ms Keating of [facility] has been helpful in some degree of care coordination re: pt's financial difficulties    - Vulnerable adult - He has had [facility] set up, but he reports [facility] would not take him home from treatments and tests.  Has Meals on Wheels weekdaily for midday meal only.  He can prep breakfast, but not any other meals.    Pt's son Rodrigue presented with him on 3/30/2023, and states that there are no waiver services set up, there are no formal mohr of atty, and that Rodrigue himself cannot be relied upon for transport, coordination, or medical supervision at this time.    If waiver services could be gained, Rodrigue would wish to be the beneficiary of caregiver hours.  We discussed today that it would then be best to have daughter Kush have both medical and financial mohr of atty.  However, Kush has expressed a desire never to be involved in her father's cares.  Pt's last son Royce Bermeo lives in [place], and has not expressed any interest in providing assistance nor support.    Pt reports that he is not eating well because he cannot cook for himself.  On 2/8/2024, he notes that he is mostly eatign liquids and soft meals, as he has no good teeth, and he has no dentist to help him set up dentures.  Son will occ grocery shop, but does not prep meals.    Pt pays mortgage on his home, and has  "not added anyone to the deed/title.  He reports that this is due to GI bill provisions, and since Alec is not a , he cannot have the home through this mortgage.    He does not know how to get care thru VA system, though one of his VA provider told him he would need to see a specialist.   He also endorses losing items about his home, and a concern about his memory.  Chores about the house are nearly impossible because he cannot bend over, and he can't stand for more than about 3min at a time.    - Home DME, supports -    = Pt has stairglide/chairlift in home.  Confirmed that this was resolved and improved by home health RN 3/30/23.   = Transport - Relies on [facility] for all support.  Family unhelpful.  Pt's 's license revoked by State, pending medical evaluation and re-testing.      From our first visit on 3/1/22:    \"Pt has demonstrated -- over a year ago now -- progression of his illness (rising PSA and decreasing functional status) despite aggressive therapies and cytotoxic treatments.  His side effects vs decompensation in his overall illness was so severe he was admitted to [facility] in march 2021.  Since that time, he has recovered some function with careful management of his cardiac disease, and less aggressive, less toxic cancer cares.  At this time, his treatment plan involves Xofigo for bone-avid disease, +/- Lupron.  We note that the patient has demonstrated some disease progression on other hormonal blockers, but that this plan of care is being offered, if not rec'd, by Dr. Toledo.    Pt presents to our office with suboptimally controlled pain, ongoing wt loss, poor appetite, and general malaise.  His concerns are more related to his back and R foot pain; a Podiatrist today advised him that his unilateral neuropathic symptoms 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.       Today, he can't quite recall his medications, he just insists that he needs 'something stronger than " "tynlenol'  When we pointed out that he has used tramadol and oxyIR recently, he also stated that these medications were not helpful.      Pt endorses support from his son, who will drive pt to appts and on other errands.   Pt can't name his meds, but has some vague understanding of the indications for his meds.\"    Past medical, surgical, social, and family histories are reviewed and pertinent updates are made.    Review of Systems   Unable to perform ROS: Dementia         Vital Signs    /74 (BP Location: Left arm, Patient Position: Sitting, Cuff Size: Standard)   Pulse 91   Temp (!) 96.5 °F (35.8 °C) (Temporal)   Wt 99 kg (218 lb 4.1 oz)   SpO2 97%   BMI 34.18 kg/m²     Physical Exam and Objective Data  Physical Exam  Constitutional:       General: He is not in acute distress.     Appearance: He is ill-appearing. He is not toxic-appearing or diaphoretic.      Comments: Frail   HENT:      Head: Normocephalic and atraumatic.      Right Ear: External ear normal.      Left Ear: External ear normal.   Eyes:      General:         Right eye: No discharge.         Left eye: No discharge.      Conjunctiva/sclera: Conjunctivae normal.      Pupils: Pupils are equal, round, and reactive to light.   Neck:      Trachea: No tracheal deviation.   Cardiovascular:      Rate and Rhythm: Regular rhythm. Tachycardia present.   Pulmonary:      Effort: Pulmonary effort is normal. No respiratory distress.      Breath sounds: No stridor.   Abdominal:      General: There is distension.      Palpations: Abdomen is soft.      Comments: overweight   Skin:     General: Skin is warm and dry.      Coloration: Skin is pale.      Findings: Lesion (small 1x0.5 non-decub shallow ulcer on dorsal R hallux) present. No erythema or rash.   Neurological:      General: No focal deficit present.      Mental Status: Mental status is at baseline. He is disoriented.      Cranial Nerves: No cranial nerve deficit.      Motor: Weakness (generalize) " present.   Psychiatric:      Comments: Judgment and insight poor       Radiology and Laboratory:  I personally reviewed and interpreted the following results: none new    I have spent a total time of 40+ minutes on 5/13/2024 in caring for this patient including: chart review; symptom pursuit; supportive listening; anticipatory guidance. review and assessment of decisional apparatus and capacity, applicable legal codes and extant documents;

## 2024-05-14 ENCOUNTER — PATIENT OUTREACH (OUTPATIENT)
Dept: CASE MANAGEMENT | Facility: OTHER | Age: 79
End: 2024-05-14

## 2024-05-14 NOTE — PROGRESS NOTES
"OSW placed outreach TC to pt this morning. OSW introduced self and role. Pt states that he feels great. He shared that he has completed his treatments. OSW asked pt if there is anything he needs help with. He reports \"not right now\", but shared that he does receive MOW. He shared that this has been very helpful. He cooks his own meals as well,when he feels up to it. He was at [employer] while we were speaking and states he goes to the grocery store himself.   Pts son lives in the [facility], however he seems to do everything for himself. OSW offered to call and check in on him again and he was agreeable.  OSW will place another outreach TC in a month.     "

## 2024-05-16 DIAGNOSIS — L08.9 DIABETIC FOOT INFECTION  (HCC): ICD-10-CM

## 2024-05-16 DIAGNOSIS — Z51.5 PALLIATIVE CARE PATIENT: ICD-10-CM

## 2024-05-16 DIAGNOSIS — E11.628 DIABETIC FOOT INFECTION  (HCC): ICD-10-CM

## 2024-05-16 RX ORDER — BLOOD SUGAR DIAGNOSTIC
STRIP MISCELLANEOUS
Qty: 100 EACH | Refills: 5 | Status: SHIPPED | OUTPATIENT
Start: 2024-05-16

## 2024-05-23 ENCOUNTER — IN-CLINIC DEVICE VISIT (OUTPATIENT)
Dept: CARDIOLOGY CLINIC | Facility: CLINIC | Age: 79
End: 2024-05-23
Payer: COMMERCIAL

## 2024-05-23 DIAGNOSIS — Z95.810 PRESENCE OF IMPLANTABLE CARDIOVERTER-DEFIBRILLATOR (ICD): Primary | ICD-10-CM

## 2024-05-23 PROCEDURE — 93284 PRGRMG EVAL IMPLANTABLE DFB: CPT | Performed by: INTERNAL MEDICINE

## 2024-05-24 NOTE — PROGRESS NOTES
Results for orders placed or performed in visit on 05/23/24   Cardiac EP device report    Narrative    MARCY BI-V ICD -ACTIVE SYSTEM IS MRI CONDITIONAL  DEVICE INTERROGATED IN THE [facility] OFFICE: BATTERY VOLTAGE ADEQUATE (4.1 YRS). AP 48% BVP 98%. ALL LEAD PARAMETERS WITHIN NORMAL LIMITS. SINCE LAST CLINIC OV ON 5/15/2023 (NON COMPLIANT REMOTE F/U);  1 VT EPISODE DETECTED IN VF ZONE, 17 BEATS @ 218 BPM (9 SECS) W/ 1 SUCCESSFUL ATP THERAPY DELIVERED. 4 DEVICE CLASSIFIED NSVT EPISODES, ONE EGM SHOWING NSVT, 14 BEATS @ 189 BPM. OTHERS SUGG FOR ST, -158 BPM. 28 AMS EPISODES, MAX DURATION 16 SECS, NO EGM'S AVAILABLE. AT AF BURDEN <1%. PT TAKING ASA, CLOPIDOGREL, METOPROLOL SUCC. EF 30-35% (ECHO 8/2/2023). CORVUE IMPEDANCE MONITORING WITHIN NORMAL LIMITS. NO PROGRAMMING CHANGES MADE TO DEVICE PARAMETERS. NORMAL DEVICE FUNCTION. PAS/ES

## 2024-05-29 ENCOUNTER — HOSPITAL ENCOUNTER (OUTPATIENT)
Dept: INFUSION CENTER | Facility: CLINIC | Age: 79
Discharge: HOME | End: 2024-05-29
Payer: COMMERCIAL

## 2024-05-29 ENCOUNTER — TELEPHONE (OUTPATIENT)
Dept: INFUSION CENTER | Facility: CLINIC | Age: 79
End: 2024-05-29

## 2024-05-29 ENCOUNTER — TELEPHONE (OUTPATIENT)
Dept: LAB | Facility: HOSPITAL | Age: 79
End: 2024-05-29

## 2024-05-29 VITALS — WEIGHT: 221 LBS | HEIGHT: 67 IN | BODY MASS INDEX: 34.69 KG/M2

## 2024-05-29 DIAGNOSIS — Z95.828 PORT-A-CATH IN PLACE: Primary | ICD-10-CM

## 2024-05-29 DIAGNOSIS — C79.51 MALIGNANT NEOPLASM METASTATIC TO BONE (HCC): ICD-10-CM

## 2024-05-29 DIAGNOSIS — C61 PROSTATE CANCER (HCC): ICD-10-CM

## 2024-05-29 LAB
ALBUMIN SERPL BCP-MCNC: 3.8 G/DL (ref 3.5–5)
ALP SERPL-CCNC: 100 U/L (ref 34–104)
ALT SERPL W P-5'-P-CCNC: 13 U/L (ref 7–52)
ANION GAP SERPL CALCULATED.3IONS-SCNC: 8 MMOL/L (ref 4–13)
AST SERPL W P-5'-P-CCNC: 10 U/L (ref 13–39)
BILIRUB SERPL-MCNC: 0.59 MG/DL (ref 0.2–1)
BUN SERPL-MCNC: 18 MG/DL (ref 5–25)
CALCIUM SERPL-MCNC: 8.1 MG/DL (ref 8.4–10.2)
CHLORIDE SERPL-SCNC: 109 MMOL/L (ref 96–108)
CO2 SERPL-SCNC: 22 MMOL/L (ref 21–32)
CREAT SERPL-MCNC: 0.78 MG/DL (ref 0.6–1.3)
GFR SERPL CREATININE-BSD FRML MDRD: 86 ML/MIN/1.73SQ M
GLUCOSE SERPL-MCNC: 103 MG/DL (ref 65–140)
POTASSIUM SERPL-SCNC: 3.8 MMOL/L (ref 3.5–5.3)
PROT SERPL-MCNC: 6.1 G/DL (ref 6.4–8.4)
SODIUM SERPL-SCNC: 139 MMOL/L (ref 135–147)

## 2024-05-29 PROCEDURE — 80053 COMPREHEN METABOLIC PANEL: CPT | Performed by: INTERNAL MEDICINE

## 2024-05-29 RX ADMIN — LEUPROLIDE ACETATE 22.5 MG: KIT at 09:55

## 2024-05-29 RX ADMIN — ALTEPLASE 2 MG: 2.2 INJECTION, POWDER, LYOPHILIZED, FOR SOLUTION INTRAVENOUS at 08:55

## 2024-05-29 RX ADMIN — DENOSUMAB 120 MG: 120 INJECTION SUBCUTANEOUS at 09:48

## 2024-05-29 NOTE — TELEPHONE ENCOUNTER
Spoke with patient in regards to making new appointments. Scheduled port flush and injections for future dates. Additionally called patient's son to inform him appointments were created.

## 2024-05-29 NOTE — PROGRESS NOTES
Pt here for Xgeva, Lupron and central .   Unable to obtain blood return from PAC initially with multiple NSS flushes and position changes. TPA instilled for 45 minutes, + blood return.   Peripheral CMP drawn and sent to the lab for updated lab results for Xgeva injection.   Labs resulted/reviewed-within parameters for injection today.   Xgeva given in  L arm without complications.   Lupron given in L dorsogluteal site without complications.   Pt scheduled next port flush for 7/24 @ 8am and next Xgeva/Lupron/Portflush for 8/21 @ 0830.   [facility] declined.[facility] transport notified of discharge.  Contacted Lab Outreach to schedule patient for an At Home Lab draw prior to his next xgeva injection.

## 2024-05-30 ENCOUNTER — TELEPHONE (OUTPATIENT)
Dept: CARDIOLOGY CLINIC | Facility: CLINIC | Age: 79
End: 2024-05-30

## 2024-05-30 DIAGNOSIS — I50.22 CHRONIC HFREF (HEART FAILURE WITH REDUCED EJECTION FRACTION) (HCC): ICD-10-CM

## 2024-05-30 RX ORDER — METOPROLOL SUCCINATE 25 MG/1
25 TABLET, EXTENDED RELEASE ORAL DAILY
Qty: 90 TABLET | Refills: 2 | Status: SHIPPED | OUTPATIENT
Start: 2024-05-30

## 2024-05-30 NOTE — TELEPHONE ENCOUNTER
----- Message from Kelsy Trejo MD sent at 5/30/2024  1:08 PM EDT -----  Bryanna, please advise to increase metoprolol to 25mg daily    Clerical team, please schedule for overdue follow up appointment    Thanks  ----- Message -----  From: Jonas Oviedo MD  Sent: 5/24/2024   4:51 PM EDT  To: Kelsy Trejo MD    VT episodes noted. Increase metoprolol if possible     FYI  ----- Message -----  From: Interface, [place] Results Incoming  Sent: 5/24/2024   1:00 PM EDT  To: Jonas Oviedo MD

## 2024-06-06 DIAGNOSIS — I50.42 CHRONIC COMBINED SYSTOLIC AND DIASTOLIC CHF (CONGESTIVE HEART FAILURE) (HCC): ICD-10-CM

## 2024-06-07 RX ORDER — FUROSEMIDE 20 MG/1
40 TABLET ORAL DAILY
Qty: 60 TABLET | Refills: 5 | Status: SHIPPED | OUTPATIENT
Start: 2024-06-07

## 2024-06-11 ENCOUNTER — PATIENT OUTREACH (OUTPATIENT)
Dept: CASE MANAGEMENT | Facility: OTHER | Age: 79
End: 2024-06-11

## 2024-06-11 NOTE — PROGRESS NOTES
OSW placed outreach TC to pt this morning. Pts mailbox is full, therefor this writer was unable to leave a VM. OSW will place another outreach at a later date.

## 2024-06-13 ENCOUNTER — OFFICE VISIT (OUTPATIENT)
Dept: PALLIATIVE MEDICINE | Facility: CLINIC | Age: 79
End: 2024-06-13
Payer: COMMERCIAL

## 2024-06-13 VITALS
TEMPERATURE: 96 F | WEIGHT: 216.49 LBS | DIASTOLIC BLOOD PRESSURE: 70 MMHG | OXYGEN SATURATION: 96 % | HEART RATE: 81 BPM | BODY MASS INDEX: 33.91 KG/M2 | SYSTOLIC BLOOD PRESSURE: 110 MMHG

## 2024-06-13 DIAGNOSIS — F01.B0 MODERATE VASCULAR DEMENTIA WITHOUT BEHAVIORAL DISTURBANCE, PSYCHOTIC DISTURBANCE, MOOD DISTURBANCE, OR ANXIETY (HCC): ICD-10-CM

## 2024-06-13 DIAGNOSIS — M79.602 ARM PAIN, ANTERIOR, LEFT: Primary | ICD-10-CM

## 2024-06-13 DIAGNOSIS — G89.3 CANCER-RELATED PAIN: Chronic | ICD-10-CM

## 2024-06-13 DIAGNOSIS — Z51.5 PALLIATIVE CARE PATIENT: ICD-10-CM

## 2024-06-13 PROCEDURE — 99214 OFFICE O/P EST MOD 30 MIN: CPT | Performed by: FAMILY MEDICINE

## 2024-06-13 RX ORDER — OXYCODONE HYDROCHLORIDE 5 MG/1
5 TABLET ORAL EVERY 6 HOURS PRN
Qty: 120 TABLET | Refills: 0 | Status: SHIPPED | OUTPATIENT
Start: 2024-06-13

## 2024-06-13 NOTE — PROGRESS NOTES
Outpatient Follow-Up - Palliative and Supportive Care   Royce Rene 78 y.o. male [id number]    1. Arm pain, anterior, left  -     Ambulatory referral to Spine & Pain Management; Future  2. Cancer-related pain  -     oxyCODONE (ROXICODONE) 5 immediate release tablet; Take 1 tablet (5 mg total) by mouth every 6 (six) hours as needed (cancer pain) Max Daily Amount: 20 mg  3. Palliative care patient  -     oxyCODONE (ROXICODONE) 5 immediate release tablet; Take 1 tablet (5 mg total) by mouth every 6 (six) hours as needed (cancer pain) Max Daily Amount: 20 mg  4. Moderate vascular dementia without behavioral disturbance, psychotic disturbance, mood disturbance, or anxiety (HCC)      Medications Discontinued During This Encounter   Medication Reason    oxyCODONE (ROXICODONE) 5 immediate release tablet Reorder     Narrative rationale for today's treatments:  - No changes to opioids nor other symptom meds  - Pt remains poorly aware of how to coordinate cares and prevent harms from his overall level of illness, such as injury to his foot.   - Podiatry due on 6/27.  - Pt demonstrates ongoing inability to coordinate cares, and to know medications.      Royce Rene was seen today for symptoms and planning cares related to above illnesses.  I have reviewed the patient's controlled substance dispensing history in the Prescription Drug Monitoring Program in compliance with the [place] regulations before prescribing any controlled substances.    They are invited to continue to follow with us.  If there are questions or concerns, please contact us through our clinic/answering service 24 hours a day, seven days a week.    Gavin Andrew  [facility] Palliative and Supportive [facility]  [phone]      Visit Information    Accompanied By: none    Source of History: Self    History Limitations: limited health literacy, dementia    Contacts: son Rodrigue Rene - [phone]   Maricruz Steven  - [phone]    History of Present  Illness      Royce Rene is a 78 y.o. male who presents in follow up of symptoms related to Stage IV prostate cancer, hormone treatment resistant.  He follows with Dr. Hernandez and Ms Philip for this.  There is also a significant cardiovascular history, with heart failure management by Dr. Trejo, and PAD with distal pain at rest in both feet.  He has an ICD for a h/o VT, and underwent TAVR in 6/2021.  Pertinent issues include: symptom management, resource management.      Since last visit, pt reports that he would like to get a powered chair or scooter to go see [employer] games in [place] or locally.  He feels his son would be able to lift the chair and put it in the car and help the pt go about his business.  Pt was able to walk in today without assist, using a dysfunctional walker.  His sats were nominal.  He is not on oxygen.  I therefore informed him that I cannot complete the form as requested, as I do not have evidence that he meets criteria for this powered device.    There is a concern about brain mets from his prostate ca, based on an equivocal finding on most recent CT w/ + w/o.  An MRI cannot be done to follow this lesion, d/t pacer placement.  A repeat CT is ordered instead, for July.    Pt endorses pain and tingling radiating down the non-dom L arm on occ, worsening over time.      Again today pt is wearing an open-toed medical sandal.  The R foot shows new injuries, with a scaly wound on the top of the hallux.  He has not seen Podiatry.      In May 2024, pt endorses a terrible fall while trying to change the garbage.  He reports that he laid on floor for several moments, unable to call his son, and he has worse pains after the fall.  No LOC.      Pt reports that he continues to have safety issues with his rollator not actually being safe to sit on, and so instead he has a stool or office chair with wheels that he will sit on and pull himself about with his heels throughout the home.      In April 2024, pt  reports that his son doesn't wish to pursue Waiver form as he does not wish to be beholden to his father for cares.    Pt has not accessed Podiatry referral we sent last month, as he didn't feel like they would do anything to help him.    Pt reports that he has a niece, and a granddaughter, who may be helpful to him.  He is not sure how to include them in his cares; granddaughter [place].  Niece lives near [place].  He might consider speaking with his niece to support him in day-to-day cares, or special trips for medical appointments.       In March, I had been informed by [employer] staff that the pt is indeed supervised and supported by his son, as much as the son appear to have the capacity between his jobs to help.  We do note that the patient has been making it to his scheduled infusions since our last visit, though he has missed an appt with the treating provider, and this has not been rescheduled.    We do note that the [employer] worker has needed the pt to complete a FED assessment at [phone].  Unfortunately, this process was not completed as requested, and the case has been closed by the Agency.      In February, pt reports that no one in his life will help him.  He reports that he doesn't have prednisone at home, despite Dr. Hernandez has send 6mos of this med to pharmacy within last 2 mos.  Unclear if pt is having this med delivered; he will not ask his son for help, as son has two jobs.    Pt reports limited intake of solid foods d/t edentulous status.  He has no dentist to help him prepare dentures.    He reports forgetting several pills, carmen his midday doses.  He again reports ongoing memory challenges, of a general nature, that are worsening over time.  He reports feeling overwhelmed by the complexity of his cares, and he has lost several contact cards for his providers and assistants.    Calls to pharmacy show that prednisone is continuing to be including in his bubblepak at doses written by Dr. Hernandez, and  delivered to the home with his other meds.      We have been informed that pt's son will not answer phone before 2pm daily.  Pt's daughter Kush has never been helpful in care coordination in our experience, reportedly, she has singed off his case for any contact or care..    - Vision challenge -  He has fogginess in the far vision, and cannot read effectively.  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    - Heart failure - Advised by Cards to use 2g Na / 2L H2O diet, as well as daily wts  - Vascular health - 4/26/23, pt saw Dr. Abbasi in office, and it seems that the plan ongoing is for DAPT life-long, and deferral of further cares to Podiatry.  Pt has not seen Podiatry since 3/28/23, as of 11/28/2023.  - Care coordination - Pt needs [facility] from [facility] [place] to continue cares with Onc, Palliative, and any other providers in Network.  Pt completely unable to manage this issue on his own; is unaware of his PCP, nor the systems involved in VA's gatekeeping to specialty care / 's Choice program.   - Oncology Care - Dr. Hernandez has been effectively managing pt's medical illness, as noted by sustained suppression of his PSA on repeat measurements on current therapies.  Ms Keating of [facility] has been helpful in some degree of care coordination re: pt's financial difficulties    - Vulnerable adult - He has had [facility] set up, but he reports [facility] would not take him home from treatments and tests.  Has Meals on Wheels weekdaily for midday meal only.  He can prep breakfast, but not any other meals.    Pt's son Rodrigue presented with him on 3/30/2023, and states that there are no waiver services set up, there are no formal mohr of atty, and that Rodrigue himself cannot be relied upon for transport, coordination, or medical supervision at this time.    If waiver services could be gained, Rodrigue would wish to be the beneficiary of caregiver hours.  We  "discussed today that it would then be best to have daughter Kush have both medical and financial mohr of atty.  However, Kush has expressed a desire never to be involved in her father's cares.  Pt's last son Royce Bermeo lives in [place], and has not expressed any interest in providing assistance nor support.    Pt reports that he is not eating well because he cannot cook for himself.  On 2/8/2024, he notes that he is mostly eatign liquids and soft meals, as he has no good teeth, and he has no dentist to help him set up dentures.  Son will occ grocery shop, but does not prep meals.    Pt pays mortgage on his home, and has not added anyone to the deed/title.  He reports that this is due to GI bill provisions, and since Alec is not a , he cannot have the home through this mortgage.    He does not know how to get care thru VA system, though one of his VA provider told him he would need to see a specialist.   He also endorses losing items about his home, and a concern about his memory.  Chores about the house are nearly impossible because he cannot bend over, and he can't stand for more than about 3min at a time.    - Home DME, supports -    = Pt has stairglide/chairlift in home.  Confirmed that this was resolved and improved by home health RN 3/30/23.   = Transport - Relies on [facility] for all support.  Family unhelpful.  Pt's 's license revoked by State, pending medical evaluation and re-testing.      From our first visit on 3/1/22:    \"Pt has demonstrated -- over a year ago now -- progression of his illness (rising PSA and decreasing functional status) despite aggressive therapies and cytotoxic treatments.  His side effects vs decompensation in his overall illness was so severe he was admitted to [facility] in march 2021.  Since that time, he has recovered some function with careful management of his cardiac disease, and less aggressive, less toxic cancer cares.  At this time, his treatment " "plan involves Xofigo for bone-avid disease, +/- Lupron.  We note that the patient has demonstrated some disease progression on other hormonal blockers, but that this plan of care is being offered, if not rec'd, by Dr. Toledo.    Pt presents to our office with suboptimally controlled pain, ongoing wt loss, poor appetite, and general malaise.  His concerns are more related to his back and R foot pain; a Podiatrist today advised him that his unilateral neuropathic symptoms 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.       Today, he can't quite recall his medications, he just insists that he needs 'something stronger than tynlenol'  When we pointed out that he has used tramadol and oxyIR recently, he also stated that these medications were not helpful.      Pt endorses support from his son, who will drive pt to appts and on other errands.   Pt can't name his meds, but has some vague understanding of the indications for his meds.\"    Past medical, surgical, social, and family histories are reviewed and pertinent updates are made.      Vital Signs    /70 (BP Location: Right arm, Patient Position: Sitting, Cuff Size: Large)   Pulse 81   Temp (!) 96 °F (35.6 °C) (Temporal)   Wt 98.2 kg (216 lb 7.9 oz)   SpO2 96%   BMI 33.91 kg/m²     Physical Exam and Objective Data  Physical Exam  Constitutional:       General: He is not in acute distress.     Appearance: He is ill-appearing. He is not toxic-appearing or diaphoretic.      Comments: Frail   HENT:      Head: Normocephalic and atraumatic.      Right Ear: External ear normal.      Left Ear: External ear normal.   Eyes:      General:         Right eye: No discharge.         Left eye: No discharge.      Conjunctiva/sclera: Conjunctivae normal.      Pupils: Pupils are equal, round, and reactive to light.   Neck:      Trachea: No tracheal deviation.   Cardiovascular:      Rate and Rhythm: Regular rhythm. Tachycardia present.   Pulmonary:      Effort: Pulmonary effort is " normal. No respiratory distress.      Breath sounds: No stridor.   Abdominal:      General: There is distension.      Palpations: Abdomen is soft.   Skin:     General: Skin is warm and dry.      Coloration: Skin is pale.      Findings: No erythema or rash.   Neurological:      Mental Status: Mental status is at baseline. He is disoriented.      Cranial Nerves: No cranial nerve deficit.      Motor: No weakness.      Gait: Gait abnormal (using walker).   Psychiatric:      Comments: Poor insight and judgment.  Limited recall.  Poor orientation.  Does not appear competent for medical decision making.       Radiology and Laboratory:  I personally reviewed and interpreted the following results: none new    I have spent a total time of 35+ minutes on 6/13/2024 in caring for this patient including: chart review; symptom pursuit; supportive listening; anticipatory guidance.

## 2024-06-13 NOTE — Clinical Note
Pt presented to our office today with a request to eval for powered w/c or scooter.  He did not seem to have good insight into the use of this device on my exam, and our office cannot do a 6min walk test to gauge his desaturations, which is a requirement to move ahead with the veround agency.  I will defer this paperwork to you.  Will inter-office mail to your clinic.

## 2024-06-18 DIAGNOSIS — I50.22 CHRONIC HFREF (HEART FAILURE WITH REDUCED EJECTION FRACTION) (HCC): ICD-10-CM

## 2024-06-18 RX ORDER — LOSARTAN POTASSIUM 25 MG/1
12.5 TABLET ORAL DAILY
Qty: 45 TABLET | Refills: 1 | Status: SHIPPED | OUTPATIENT
Start: 2024-06-18

## 2024-06-25 ENCOUNTER — TELEPHONE (OUTPATIENT)
Dept: PALLIATIVE MEDICINE | Facility: CLINIC | Age: 79
End: 2024-06-25

## 2024-06-25 NOTE — TELEPHONE ENCOUNTER
Left message with Ulysses from the [facility] in regards to getting his referral renewal. Asked him to call us back in regards to it. His number if needed is [phone]

## 2024-06-27 ENCOUNTER — HOSPITAL ENCOUNTER (OUTPATIENT)
Dept: RADIOLOGY | Facility: HOSPITAL | Age: 79
End: 2024-06-27
Attending: PAIN MEDICINE
Payer: COMMERCIAL

## 2024-06-27 ENCOUNTER — OFFICE VISIT (OUTPATIENT)
Dept: PODIATRY | Facility: CLINIC | Age: 79
End: 2024-06-27
Payer: COMMERCIAL

## 2024-06-27 ENCOUNTER — CONSULT (OUTPATIENT)
Dept: PAIN MEDICINE | Facility: CLINIC | Age: 79
End: 2024-06-27
Payer: COMMERCIAL

## 2024-06-27 ENCOUNTER — TELEPHONE (OUTPATIENT)
Dept: PAIN MEDICINE | Facility: CLINIC | Age: 79
End: 2024-06-27

## 2024-06-27 VITALS
HEIGHT: 67 IN | HEART RATE: 89 BPM | DIASTOLIC BLOOD PRESSURE: 68 MMHG | BODY MASS INDEX: 33.91 KG/M2 | SYSTOLIC BLOOD PRESSURE: 102 MMHG | RESPIRATION RATE: 18 BRPM

## 2024-06-27 VITALS
WEIGHT: 216 LBS | HEART RATE: 66 BPM | DIASTOLIC BLOOD PRESSURE: 75 MMHG | SYSTOLIC BLOOD PRESSURE: 110 MMHG | BODY MASS INDEX: 33.9 KG/M2 | HEIGHT: 67 IN

## 2024-06-27 DIAGNOSIS — M47.812 CERVICAL SPONDYLOSIS: ICD-10-CM

## 2024-06-27 DIAGNOSIS — I73.9 PAD (PERIPHERAL ARTERY DISEASE) (HCC): ICD-10-CM

## 2024-06-27 DIAGNOSIS — I74.3 EMBOLISM AND THROMBOSIS OF ARTERIES OF THE LOWER EXTREMITIES (HCC): ICD-10-CM

## 2024-06-27 DIAGNOSIS — M54.12 CERVICAL RADICULOPATHY: ICD-10-CM

## 2024-06-27 DIAGNOSIS — L60.3 ONYCHODYSTROPHY: ICD-10-CM

## 2024-06-27 DIAGNOSIS — E11.42 DIABETIC POLYNEUROPATHY ASSOCIATED WITH TYPE 2 DIABETES MELLITUS (HCC): Primary | ICD-10-CM

## 2024-06-27 DIAGNOSIS — M54.12 CERVICAL RADICULOPATHY: Primary | ICD-10-CM

## 2024-06-27 DIAGNOSIS — M79.602 ARM PAIN, ANTERIOR, LEFT: ICD-10-CM

## 2024-06-27 DIAGNOSIS — F33.9 DEPRESSION, RECURRENT (HCC): ICD-10-CM

## 2024-06-27 DIAGNOSIS — G62.9 PERIPHERAL NEUROPATHY: Chronic | ICD-10-CM

## 2024-06-27 PROCEDURE — 99213 OFFICE O/P EST LOW 20 MIN: CPT | Performed by: PODIATRIST

## 2024-06-27 PROCEDURE — 99203 OFFICE O/P NEW LOW 30 MIN: CPT | Performed by: PAIN MEDICINE

## 2024-06-27 PROCEDURE — 72050 X-RAY EXAM NECK SPINE 4/5VWS: CPT

## 2024-06-27 NOTE — PROGRESS NOTES
Assessment  1. Cervical radiculopathy  -     XR spine cervical complete 4 or 5 vw non injury; Future; Expected date: 06/27/2024  -     MRI cervical spine wo contrast; Future; Expected date: 06/27/2024  -     Referral to [facility]; Future  2. Arm pain, anterior, left  -     Ambulatory referral to Spine & Pain Management  3. Cervical spondylosis  4. Embolism and 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 (HCC)  5. Depression, recurrent (HCC)        78-year-old male with history of left arm radicular pain that radiates from his left shoulder down to his left forearm burning aching throbbing symptoms likely in the setting of cervical radicular symptoms.  Pain is ongoing for the past 1 year increasing in severity in the past 3 months he takes oxycodone which helps him prescribe a palliative medicine.    I due to his persistent pain symptoms we will obtain x-ray today cervical MRI as well to evaluate further. continue with gabapentin and oxycodone.    Follow-up after MRI is completed    Differential also includes possible entrapment disorders in the left shoulder and possible vascular issues.        My impressions and treatment recommendations were discussed in detail with the patient who verbalized understanding and had no further questions.  Discharge instructions were provided. I personally saw and examined the patient and I agree with the above discussed plan of care.    Plan      No orders of the defined types were placed in this encounter.      Orders Placed This Encounter   Procedures   • XR spine cervical complete 4 or 5 vw non injury     Standing Status:   Future     Standing Expiration Date:   6/27/2028     Scheduling Instructions:      Bring along any outside films relating to this procedure.           Order Specific Question:   Reason for Exam:     Answer:   neck pain   • MRI cervical spine wo contrast     Standing Status:   Future     Standing Expiration Date:   6/27/2028      Scheduling Instructions:      There is no preparation for this test. Please leave your jewelry and valuables at home, wedding rings are the exception. Please bring your physician order, insurance cards, a form of photo ID and a list of your medications with you. Arrive 15 minutes prior to your appointment time in order to       register. Please bring any prior CT or MRI studies of this area that were not performed at a [facility].            To schedule this appointment, please contact Central Scheduling at [phone].     Order Specific Question:   What is the patient's sedation requirement?     Answer:   No Sedation     Order Specific Question:   Does the patient have metallic implants?     Answer:   No   • Referral to Home Health- [facility]     Standing Status:   Future     Standing Expiration Date:   6/27/2025     Referral Priority:   Routine     Referral Type:   Home Health     Referral Reason:   Specialty Services Required     Requested Specialty:   Home Health Services     Number of Visits Requested:   1     Expiration Date:   6/27/2025         History of Present Illness    Royce Rene is a 78 y.o. male with relevant PMH of stage IV prostate cancer hormone treatment resistant with 1 year history of left neck pain rating down to the left shoulder left forearm and hand symptoms are chronic he has a history of an ICD for history of V. tach underwent TAVR he is currently on Plavix TAVR was in 6/20/2021 his pain is persistent aching throbbing burning symptoms he takes oxycodone 5 mg every 6 hours which helps his pain pain is currently a 8 out of 10 no alleviating factors.      I have personally reviewed and/or updated the patient's past medical history, past surgical history, family history, social history, current medications, allergies, and vital signs today.     Review of Systems   Musculoskeletal:  Positive for arthralgias, back pain, gait problem, neck pain and neck stiffness.   All other  systems reviewed and are negative.      Patient Active Problem List   Diagnosis   • Chronic combined systolic and diastolic CHF (congestive heart failure) (HCC)   • S/P AVR   • Anemia   • Ischemic cardiomyopathy   • Ischemic leg pain   • Prostate cancer (HCC)   • CAD (coronary artery disease)   • Urinary retention   • PAD (peripheral artery disease) (HCC)   • Port-A-Cath in place   • 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)   • Embolism and 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 (HCC)   • Depression, recurrent (HCC)   • Cancer-related pain   • Palliative care patient   • Ambulatory dysfunction   • Peripheral neuropathy   • Financial problems   • Moderate vascular dementia (HCC)   • Class 2 severe obesity due to excess calories with serious comorbidity and body mass index (BMI) of 36.0 to 36.9 in adult (HCC)   • Elevated liver enzymes   • Proctitis   • Frail elder // vulnerable adult   • Acute on chronic diastolic CHF (congestive heart failure) (HCC)       Past Medical History:   Diagnosis Date   • 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   • Coronary artery disease 2001    S/p stenting to circumflex and RCA   • HFrEF (heart failure with reduced ejection fraction) (HCC) 06/14/2021   • High cholesterol    • Prostate cancer (HCC)        Past Surgical History:   Procedure Laterality Date   • CARDIAC ELECTROPHYSIOLOGY PROCEDURE N/A 12/23/2021    Procedure: Implant ICD - bi ventricular;  Surgeon: Jonas Oviedo MD;  Location: [facility];  Service: Cardiology   • CARDIAC SURGERY     • IR LOWER EXTREMITY ANGIOGRAM  4/18/2023   • [place] TEAEC W/WO PATCH GRAFT COMMON FEMORAL Right 7/9/2021    Procedure: ENDARTERECTOMY ARTERIAL FEMORAL;  Surgeon: Serina Cook DO;  Location: [facility];  Service: Vascular       Family History   Problem Relation Age of Onset   • Cancer Mother        Social History     Occupational History   • Not on file   Tobacco Use   • Smoking status: Former     Current packs/day: 0.00      Types: Cigarettes     Start date: 1962     Quit date: 2002     Years since quittin.0     Passive exposure: Past   • Smokeless tobacco: Never   Vaping Use   • Vaping status: Never Used   Substance and Sexual Activity   • Alcohol use: Not Currently   • Drug use: Not Currently   • Sexual activity: Not on file       Current Outpatient Medications on File Prior to Visit   Medication Sig   • abiraterone (ZYTIGA) 250 mg tablet Take 4 tablets (1,000 mg total) by mouth once daily   • Accu-Chek FastClix Lancets MISC TEST 2-3 TIMES AS DIRECTED   • acetaminophen (TYLENOL) 500 mg tablet Take 2 tablets (1,000 mg total) by mouth 3 (three) times a day as needed for mild pain   • Alcohol Swabs (Alcohol Pads) 70 % PADS USE 2-3 TIMES AS DIRECTED.   • atorvastatin (LIPITOR) 80 mg tablet Take 1 tablet (80 mg total) by mouth daily with dinner   • Blood Glucose Monitoring Suppl (Accu-Chek Guide Me) w/Device KIT USE AS DIRECTED   • cetirizine (ZyrTEC) 5 MG tablet Take 1 tablet (5 mg total) by mouth daily   • furosemide (LASIX) 20 mg tablet TAKE 2 TABLETS (40 MG TOTAL) BY MOUTH DAILY   • gabapentin (NEURONTIN) 300 mg capsule Take 2 capsules (600 mg total) by mouth 2 (two) times a day   • glucose blood (Accu-Chek Guide) test strip TEST 2-3 TIMES AS DIRECTED   • Jardiance 10 MG TABS tablet TAKE 1 TABLET (10 MG TOTAL) BY MOUTH EVERY MORNING   • Lancet Devices (Lancing Device) MISC TID   • loperamide (IMODIUM) 2 mg capsule Take 1 capsule (2 mg total) by mouth 4 (four) times a day as needed for diarrhea   • losartan (COZAAR) 25 mg tablet TAKE 0.5 TABLETS (12.5 MG TOTAL) BY MOUTH DAILY   • methocarbamol (ROBAXIN) 500 mg tablet TAKE 1 TABLET (500 MG TOTAL) BY MOUTH 3 (THREE) TIMES A DAY   • metoprolol succinate (TOPROL-XL) 25 mg 24 hr tablet Take 1 tablet (25 mg total) by mouth daily   • oxyCODONE (ROXICODONE) 5 immediate release tablet Take 1 tablet (5 mg total) by mouth every 6 (six) hours as needed (cancer pain) Max Daily Amount: 20  "mg   • polyethylene glycol (GLYCOLAX) 17 GM/SCOOP powder Take 17 g by mouth every other day Monday Wednesday Friday   • potassium chloride (Klor-Con M20) 20 mEq tablet TAKE 1 TABLET (20 MEQ TOTAL) BY MOUTH DAILY   • predniSONE 5 mg tablet Take 1 tablet (5 mg total) by mouth 2 (two) times a day with meals   • senna (SENOKOT) 8.6 MG tablet Take 1 tablet (8.6 mg total) by mouth 2 (two) times a day To prevent constipation.  Hold one dose for loose stool.   • aspirin 81 mg chewable tablet Chew 1 tablet (81 mg total) daily   • clopidogrel (PLAVIX) 75 mg tablet Take 1 tablet (75 mg total) by mouth daily   • famotidine (PEPCID) 20 mg tablet Take 1 tablet (20 mg total) by mouth daily   • ferrous sulfate 325 (65 Fe) mg tablet Take 1 tablet (325 mg total) by mouth 2 (two) times a day with meals     No current facility-administered medications on file prior to visit.       No Known Allergies      Physical Exam    /75 (BP Location: Right arm, Patient Position: Sitting, Cuff Size: Standard)   Pulse 66   Ht 5' 7\" (1.702 m)   Wt 98 kg (216 lb)   BMI 33.83 kg/m²         MSK:      Cervical Spine:  No masses or atrophy, No TTP cervical facets   Range of motion - full rom  Spurling's - negative    Strength Right Left   Elbow flexion/deltoid (C5) 5 5   Wrist extension (c6) 5 5   Elbow /finger extension (c7) 5 5   Finger flexion (c8) 5 5   Intrinsics (t1) 5 5        Reflexes:     Right Left   Biceps 1+ 1+   Triceps 1+ 1+   Brachioradialis 1++ 1+   Patellar 2+ 2+   Achilles 2+ 2+   Babinski neg neg        Sensory Exam: Full and equal sensation to light touch throughout bilateral UE  Reflexes: Brooks negative Bilaterally; Clonus negative bilaterally      Gait in wheelchair           Imaging  CT C spine  4/2016    The patient's head is tilted toward the left with a corresponding cervical   curve convex to the right. There are soft tissue carotid vascular   calcifications. There is straightening of the normal cervical lordosis. " There   is a mild retrolisthesis at C3-4 with retrolisthesis at C4-5 and T1-T2. There   is a mild loss in height of C5-C7. There are multilevel spondylotic changes   with vertebral osteophytes and degenerative endplate changes. There is   narrowing of the disc spaces at C5-6 and C6-7. The predental space is not   enlarged.

## 2024-06-27 NOTE — TELEPHONE ENCOUNTER
"Pt. Has appt. Today for Spine & Pain here in our [place] location to see Dr. Oleary. He refused the NPP. He said, \"He is tired of the same questions.\" Told him no problem.   "

## 2024-06-27 NOTE — PROGRESS NOTES
Ambulatory Visit  Name: Royce Rene      : 1945      MRN: [id number]  Encounter Provider: Jose David Moore DPM  Encounter Date: 2024   Encounter department: [facility] [place]    Discussed principles of diabetic footcare.  It is unlikely that patient understands this discussion.  There are no palpable pedal pulses and patient has significant neuropathy.    Patient has a extremely thick mycotic right great toenail.  Debridement reveals slight serous drainage beneath the plate.  Bacitracin dressing applied.  Advised him to utilize Neosporin on the toe until healed.  Removed eschar dorsum of right great toe and superficial ulcer present.  There is no cellulitis in this ulcer does not probe.  Should utilize bacitracin or Neosporin here as well.    Advised patient to clean his feet and soak them on occasion as the right foot is filthy.    Also advised him to contact his medical doctor to possibly increase gabapentin dosage.    Will be reassessed in  3 weeks  Assessment & Plan   1. Diabetic polyneuropathy associated with type 2 diabetes mellitus ([facility])  2. PAD (peripheral artery disease) ([facility])  -     Ambulatory Referral to Podiatry  3. Peripheral neuropathy  -     Ambulatory Referral to Podiatry  4. Onychodystrophy  -     Ambulatory Referral to Podiatry      History of Present Illness     Royce Rene is a 78 y.o. male who presents for diabetic pedal assessment.  Patient is wearing a surgical shoe on his right foot.  He states that as long as he wears a surgical shoe he does not have right foot pain.  If he tries to return to a shoe he has burning pain in the right foot.    Patient has a history of diabetes along with PVD.  He states that he has had vascular intervention in the past.  He relates discomfort in the right great toenail which is extremely thick.  He also has a scab on the top of the right great toe.    Patient currently takes gabapentin for symptoms of neuropathy.  He is taking 1200 mg  "daily.    It should be noted that this patient is not hygienic.  He is walking around without socks on the right shoe for an extended period and   his skin is black on the bottom of the right foot.    I personally reviewed a comprehensive metabolic panel dated 5/29/2024.  Blood glucose was 103.  BUN and creatinine were within normal limits.  Review of Systems   Cardiovascular:         Coronary artery disease and PVD   Musculoskeletal:  Positive for gait problem.   Neurological:         Paresthesia right foot   Psychiatric/Behavioral:          Vascular dementia           Objective     /68   Pulse 89   Resp 18   Ht 5' 7\" (1.702 m)   BMI 33.91 kg/m²     Physical Exam  Cardiovascular:      Pulses: Pulses are weak.           Dorsalis pedis pulses are 0 on the right side and 0 on the left side.        Posterior tibial pulses are 0 on the right side and 0 on the left side.   Feet:      Right foot:      Skin integrity: Ulcer present.      Left foot:      Skin integrity: No ulcer, skin breakdown, erythema, warmth, callus or dry skin.         Diabetic Foot Exam    Patient's shoes and socks removed.    Right Foot/Ankle   Right Foot Inspection  Skin Exam: abnormal color and ulcer.        Pre-Ulcer Size (cm): 0.5    Toe Exam: tenderness.     Sensory   Monofilament testing: absent    Vascular  Capillary refills: elevated  The right DP pulse is 0. The right PT pulse is 0.     Right Toe  - Comprehensive Exam  Ecchymosis: none  Arch: normal  Hammertoes: absent  Claw Toes: absent  Swelling: none   Tenderness: none       Left Foot/Ankle  Left Foot Inspection  Skin Exam: skin normal and skin intact. No dry skin, no warmth, no erythema, no maceration, normal color, no pre-ulcer, no ulcer and no callus.     Toe Exam: ROM and strength within normal limits.     Sensory   Monofilament testing: diminished    Vascular  Capillary refills: elevated  The left DP pulse is 0. The left PT pulse is 0.     Left Toe  - Comprehensive " Exam  Ecchymosis: none  Arch: normal  Hammertoes: absent  Claw toes: absent  Swelling: none   Tenderness: none       Assign Risk Category  No deformity present  Loss of protective sensation  Weak pulses  Risk: 2        Administrative Statements

## 2024-07-05 ENCOUNTER — TELEPHONE (OUTPATIENT)
Age: 79
End: 2024-07-05

## 2024-07-05 NOTE — TELEPHONE ENCOUNTER
Caller: Patient     Doctor: PCP     Reason for call: Patient called looking for his PCP office , I offered to transfer patient when he started to yell at me and hung up     Call back#: n/a

## 2024-07-09 ENCOUNTER — TELEPHONE (OUTPATIENT)
Dept: HEMATOLOGY ONCOLOGY | Facility: CLINIC | Age: 79
End: 2024-07-09

## 2024-07-09 DIAGNOSIS — C79.31 METASTASIS TO BRAIN (HCC): Primary | ICD-10-CM

## 2024-07-09 DIAGNOSIS — C61 PROSTATE CANCER (HCC): ICD-10-CM

## 2024-07-09 NOTE — TELEPHONE ENCOUNTER
She said he needs a new CT script head w/ contrast. She said pt also needs transportation set up. She asked if the pt can be called back to schedule

## 2024-07-12 ENCOUNTER — TELEPHONE (OUTPATIENT)
Dept: PALLIATIVE MEDICINE | Facility: CLINIC | Age: 79
End: 2024-07-12

## 2024-07-12 NOTE — TELEPHONE ENCOUNTER
Called the [facility] in regards to not having his referral yet that we requested on 6/13/24. I left a message with Therese who is the one that deals with the referrals and asked her to give me a call back to see where the referral is. Patient is aware we dont have a new referral for his appointment on Monday 7/15.

## 2024-07-15 ENCOUNTER — OFFICE VISIT (OUTPATIENT)
Dept: PALLIATIVE MEDICINE | Facility: CLINIC | Age: 79
End: 2024-07-15

## 2024-07-15 VITALS
DIASTOLIC BLOOD PRESSURE: 70 MMHG | OXYGEN SATURATION: 96 % | HEART RATE: 70 BPM | BODY MASS INDEX: 33.94 KG/M2 | SYSTOLIC BLOOD PRESSURE: 112 MMHG | WEIGHT: 216.71 LBS | TEMPERATURE: 95.6 F

## 2024-07-15 DIAGNOSIS — R54 FRAIL ELDERLY: Primary | Chronic | ICD-10-CM

## 2024-07-15 DIAGNOSIS — G89.3 CANCER-RELATED PAIN: Chronic | ICD-10-CM

## 2024-07-15 DIAGNOSIS — Z51.5 PALLIATIVE CARE PATIENT: ICD-10-CM

## 2024-07-15 RX ORDER — OXYCODONE HYDROCHLORIDE 5 MG/1
5 TABLET ORAL EVERY 6 HOURS PRN
Qty: 120 TABLET | Refills: 0 | Status: SHIPPED | OUTPATIENT
Start: 2024-07-15

## 2024-07-15 NOTE — PROGRESS NOTES
Follow-up Ambulatory Visit  Name: Royce Rene      : 1945      MRN: [id number]  Encounter Provider: Gavin Andrew MD  Encounter Date: 7/15/2024   Encounter department: [facility] [place]    Assessment & Plan   1. Frail elder // vulnerable adult  2. Cancer-related pain  -     oxyCODONE (ROXICODONE) 5 immediate release tablet; Take 1 tablet (5 mg total) by mouth every 6 (six) hours as needed (cancer pain) Max Daily Amount: 20 mg  3. Palliative care patient  -     oxyCODONE (ROXICODONE) 5 immediate release tablet; Take 1 tablet (5 mg total) by mouth every 6 (six) hours as needed (cancer pain) Max Daily Amount: 20 mg      Narrative rationale for today's treatments:  - No changes to opioids nor other symptom meds  - Pt remains poorly aware of how to coordinate cares and prevent harms from his overall level of illness, such as injury to his foot.              = Podiatry identified hygiene concerns, and echoed our worries for the pt's inability to coordinate basic self-cares.   = Pt is due to be seen again this week, but has not made any appointments.  - Pt demonstrates ongoing inability to coordinate cares, and to know medications.    PDMP Review: I have reviewed the patient's controlled substance dispensing history in the Prescription Drug Monitoring Program in compliance with the [facility] regulations before prescribing any controlled substances.  .  Gavin Andrew MD  Palliative and Supportive Care  Clinic/Answering Service: [phone]  You can find me on [employer]. Secure Chat!       History of Present Illness     Royce Rene is a 78 y.o. male who presents in follow up of symptoms related to Stage IV prostate cancer, hormone treatment resistant.  He follows with Dr. Hernandez and Ms Philip for this.  There is also a significant cardiovascular history, with heart failure management by Dr. Trejo, and PAD with distal pain at rest in both feet.  He has an ICD for a h/o VT, and underwent TAVR in 2021.   Pertinent issues include: symptom management, resource management.       Since last visit, the pt did show up for Podiatry visit, and this provider was able to confirm that the pt's care of his feet is inadequate for basic hygiene, wound cares, and general safety.  He was advised to return for repeat assessment and cares in 3 weeks.  Since that visit on 6/27, it appears that he has not been in to see any providers prior to today.  Importantly, he NSH a visit with [facility] due to a prejudice against the [facility].      Today in office, he reports that he is feeling well, and has no particular concerns, and wishes for no changes.  He continues to wear the surgical shoe on the R foot, but the toenails do appear to be in better repair after POdiatry has clipped the nails and soaked the skin.  The pt does volunteer that he can't reach his feet to care for them.      Pt also state that Meals on Wheels is not appetizing, and he might prefer Grandma's meals.  He is not able to say where he got this info, how to dial Grandma's, or how to reach his .      Later, we learn that his previous  has retired from his case, and a new worker is on deck: David Sanjay, [facility]  - [phone].      There is a concern about brain mets from his prostate ca, based on an equivocal finding on most recent CT w/ + w/o.  An MRI cannot be done to follow this lesion, d/t pacer placement.  A repeat CT is ordered instead, for July 18.      In May 2024, pt endorses a terrible fall while trying to change the garbage.  He reports that he laid on floor for several moments, unable to call his son, and he has worse pains after the fall.  No LOC.      Pt reports that he continues to have safety issues with his rollator not actually being safe to sit on, and so instead he has a stool or office chair with wheels that he will sit on and pull himself about with his heels throughout the home.       In April 2024, pt reports that  his son doesn't wish to pursue Waiver form as he does not wish to be beholden to his father for cares.    Pt has not accessed Podiatry referral we sent last month, as he didn't feel like they would do anything to help him.    Pt reports that he has a niece, and a granddaughter, who may be helpful to him.  He is not sure how to include them in his cares; [place].  Niece lives near [place].  He might consider speaking with his niece to support him in day-to-day cares, or special trips for medical appointments.         In March, I had been informed by [employer] staff that the pt is indeed supervised and supported by his son, as much as the son appear to have the capacity between his jobs to help.  We do note that the patient has been making it to his scheduled infusions since our last visit, though he has missed an appt with the treating provider, and this has not been rescheduled.    We do note that the [employer] worker has needed the pt to complete a FED assessment at [phone].  Unfortunately, this process was not completed as requested, and the case has been closed by the Agency.       In February, pt reports that no one in his life will help him.  He reports that he doesn't have prednisone at home, despite Dr. Hernandez has send 6mos of this med to pharmacy within last 2 mos.  Unclear if pt is having this med delivered; he will not ask his son for help, as son has two jobs.    Pt reports limited intake of solid foods d/t edentulous status.  He has no dentist to help him prepare dentures.    He reports forgetting several pills, carmen his midday doses.  He again reports ongoing memory challenges, of a general nature, that are worsening over time.  He reports feeling overwhelmed by the complexity of his cares, and he has lost several contact cards for his providers and assistants.    Calls to pharmacy show that prednisone is continuing to be including in his bubblepak at doses written by Dr. Hernandez, and delivered  to the home with his other meds.       We have been informed that pt's son will not answer phone before 2pm daily.  Pt's daughter uKsh has never been helpful in care coordination in our experience, reportedly, she has singed off his case for any contact or care..     - Vision challenge -  He has fogginess in the far vision, and cannot read effectively.  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    - Heart failure - Advised by Cards to use 2g Na / 2L H2O diet, as well as daily wts  - Vascular health - 4/26/23, pt saw Dr. Abbasi in office, and it seems that the plan ongoing is for DAPT life-long, and deferral of further cares to Podiatry.  Pt has not seen Podiatry since 3/28/23, as of 11/28/2023.  - Care coordination - Pt needs [facility] referrals from [facility] [place] to continue cares with Onc, Palliative, and any other providers in Network.  Pt completely unable to manage this issue on his own; is unaware of his PCP, nor the systems involved in VA's gatekeeping to specialty care / 's Choice program.   - Oncology Care - Dr. Hernandez has been effectively managing pt's medical illness, as noted by sustained suppression of his PSA on repeat measurements on current therapies.  Ms Keating of [facility] has been helpful in some degree of care coordination re: pt's financial difficulties     - Vulnerable adult - He has had [facility] transport set up, but he reports [facility] would not take him home from treatments and tests.  Has Meals on Wheels weekdaily for midday meal only.  He can prep breakfast, but not any other meals.    Pt's son Rodrigue presented with him on 3/30/2023, and states that there are no waiver services set up, there are no formal mohr of atty, and that Rodrigue himself cannot be relied upon for transport, coordination, or medical supervision at this time.    If waiver services could be gained, Rodrigue would wish to be the beneficiary of caregiver hours.  We  "discussed today that it would then be best to have daughter Kush have both medical and financial mohr of atty.  However, Kush has expressed a desire never to be involved in her father's cares.  Pt's last son Royce Bermeo lives in [place], and has not expressed any interest in providing assistance nor support.    Pt reports that he is not eating well because he cannot cook for himself.  On 2/8/2024, he notes that he is mostly eatign liquids and soft meals, as he has no good teeth, and he has no dentist to help him set up dentures.  Son will occ grocery shop, but does not prep meals.    Pt pays mortgage on his home, and has not added anyone to the deed/title.  He reports that this is due to GI bill provisions, and since Alec is not a , he cannot have the home through this mortgage.    He does not know how to get care thru VA system, though one of his VA provider told him he would need to see a specialist.   He also endorses losing items about his home, and a concern about his memory.  Chores about the house are nearly impossible because he cannot bend over, and he can't stand for more than about 3min at a time.     - Home DME, supports -                = Pt has stairglide/chairlift in home.  Confirmed that this was resolved and improved by home health RN 3/30/23.               = Transport - Relies on [facility] for all support.  Family unhelpful.  Pt's 's license revoked by State, pending medical evaluation and re-testing.       From our first visit on 3/1/22:    \"Pt has demonstrated -- over a year ago now -- progression of his illness (rising PSA and decreasing functional status) despite aggressive therapies and cytotoxic treatments.  His side effects vs decompensation in his overall illness was so severe he was admitted to [facility] in march 2021.  Since that time, he has recovered some function with careful management of his cardiac disease, and less aggressive, less toxic cancer cares.  At " "this time, his treatment plan involves Xofigo for bone-avid disease, +/- Lupron.  We note that the patient has demonstrated some disease progression on other hormonal blockers, but that this plan of care is being offered, if not rec'd, by Dr. Toledo.    Pt presents to our office with suboptimally controlled pain, ongoing wt loss, poor appetite, and general malaise.  His concerns are more related to his back and R foot pain; a Podiatrist today advised him that his unilateral neuropathic symptoms 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.       Today, he can't quite recall his medications, he just insists that he needs 'something stronger than tynlenol'  When we pointed out that he has used tramadol and oxyIR recently, he also stated that these medications were not helpful.      Pt endorses support from his son, who will drive pt to appts and on other errands.   Pt can't name his meds, but has some vague understanding of the indications for his meds.\"        Objective     There were no vitals taken for this visit.    Physical Exam  Constitutional:       General: He is not in acute distress.     Appearance: He is ill-appearing. He is not toxic-appearing or diaphoretic.      Comments: Frail, overweight   HENT:      Head: Normocephalic and atraumatic.      Right Ear: External ear normal.      Left Ear: External ear normal.   Eyes:      General:         Right eye: No discharge.         Left eye: No discharge.      Conjunctiva/sclera: Conjunctivae normal.      Pupils: Pupils are equal, round, and reactive to light.   Neck:      Trachea: No tracheal deviation.   Cardiovascular:      Rate and Rhythm: Regular rhythm. Tachycardia present.   Pulmonary:      Effort: Pulmonary effort is normal. No respiratory distress.      Breath sounds: No stridor.   Abdominal:      Palpations: Abdomen is soft.      Comments: scaphoid   Skin:     General: Skin is warm and dry.      Findings: Lesion (R hallux continues to show inculcation of the nail " into the toe tip.  With blunt and limited pressure, I was not able to remove this scale or inclusion from the skin in office today.) present. No erythema or rash.   Neurological:      General: No focal deficit present.      Mental Status: Mental status is at baseline. He is disoriented.      Cranial Nerves: No cranial nerve deficit.   Psychiatric:      Comments: Poor insight, limited judgment         Recent data: none new; reviewed recent trends    Administrative Statements   I have spent a total time of 35+ minutes in caring for this patient on the day of the visit/encounter including: chart review; symptom pursuit; supportive listening; anticipatory guidance. review and assessment of decisional apparatus and capacity, applicable legal codes and extant documents; an attempt was made to call his [facility] worker again today; this went to .

## 2024-07-16 ENCOUNTER — TELEPHONE (OUTPATIENT)
Dept: PALLIATIVE MEDICINE | Facility: CLINIC | Age: 79
End: 2024-07-16

## 2024-07-16 NOTE — TELEPHONE ENCOUNTER
Cindy from [facility] called stating patient will have a new , new case work is David Montgomerys [phone]. Per Cindy Wayne put in a request to see if agency will provided [employer], per Cindy they do not provide [employer] Me[employer] and patient will need to pay out of pocket. Per Cindy they do provide contracts with [employer].     Thanks!

## 2024-07-17 ENCOUNTER — TELEPHONE (OUTPATIENT)
Dept: PAIN MEDICINE | Facility: CLINIC | Age: 79
End: 2024-07-17

## 2024-07-17 DIAGNOSIS — M54.12 CERVICAL RADICULOPATHY: Primary | ICD-10-CM

## 2024-07-17 NOTE — TELEPHONE ENCOUNTER
Attempted to call son Thong as per GUMARO and reviewed the previous task. Encouraged him to call central scheduling to schedule the CT scan of his cervical spine, phone number provided.Patient has dementia and unable to schedule on his own. LMOM to CB if any questions.

## 2024-07-17 NOTE — TELEPHONE ENCOUNTER
----- Message from Darryl Oleary MD sent at 7/17/2024  1:55 PM EDT -----  Regarding: FW: MRI  Please assist with scheduling CT C spine for patient thank you  ----- Message -----  From: Jarrod Fernandez  Sent: 7/17/2024   1:53 PM EDT  To: Darryl Olaery MD  Subject: MRI                                              Hello,   The patient list above is not eligible for an MRI per [facility], because he has heart stents as well as his ICD.    Thank you,   Jarrod MORALES (R ) () [facility]  MRI   [facility]   [phone]

## 2024-07-17 NOTE — PROGRESS NOTES
MRI cervical spine not approved per [facility] due to his cardiac stents we will obtain a CT of his cervical spine instead.  Order placed.

## 2024-07-18 ENCOUNTER — HOSPITAL ENCOUNTER (OUTPATIENT)
Dept: CT IMAGING | Facility: HOSPITAL | Age: 79
Discharge: HOME | End: 2024-07-18
Attending: INTERNAL MEDICINE
Payer: COMMERCIAL

## 2024-07-18 DIAGNOSIS — C79.31 METASTASIS TO BRAIN (HCC): ICD-10-CM

## 2024-07-18 DIAGNOSIS — C61 PROSTATE CANCER (HCC): ICD-10-CM

## 2024-07-18 PROCEDURE — 70470 CT HEAD/BRAIN W/O & W/DYE: CPT

## 2024-07-18 RX ADMIN — IOHEXOL 100 ML: 350 INJECTION, SOLUTION INTRAVENOUS at 11:47

## 2024-07-22 NOTE — TELEPHONE ENCOUNTER
Attempted to contact pt's son Thong as per medical release of information document to confirm that he received prior message as CT scan is not noted on appt desk at this time.- VMMLOM with Cb# and OH.

## 2024-07-24 ENCOUNTER — HOSPITAL ENCOUNTER (OUTPATIENT)
Dept: INFUSION CENTER | Facility: CLINIC | Age: 79
Discharge: HOME | End: 2024-07-24
Payer: COMMERCIAL

## 2024-07-24 DIAGNOSIS — Z95.828 PORT-A-CATH IN PLACE: Primary | ICD-10-CM

## 2024-07-24 DIAGNOSIS — C61 PROSTATE CANCER (HCC): ICD-10-CM

## 2024-07-24 LAB
ALBUMIN SERPL BCG-MCNC: 3.7 G/DL (ref 3.5–5)
ALP SERPL-CCNC: 124 U/L (ref 34–104)
ALT SERPL W P-5'-P-CCNC: 14 U/L (ref 7–52)
ANION GAP SERPL CALCULATED.3IONS-SCNC: 5 MMOL/L (ref 4–13)
AST SERPL W P-5'-P-CCNC: 10 U/L (ref 13–39)
BASOPHILS # BLD AUTO: 0.03 THOUSANDS/ÂΜL (ref 0–0.1)
BASOPHILS NFR BLD AUTO: 0 % (ref 0–1)
BILIRUB SERPL-MCNC: 0.33 MG/DL (ref 0.2–1)
BUN SERPL-MCNC: 28 MG/DL (ref 5–25)
CALCIUM SERPL-MCNC: 8.8 MG/DL (ref 8.4–10.2)
CHLORIDE SERPL-SCNC: 107 MMOL/L (ref 96–108)
CO2 SERPL-SCNC: 28 MMOL/L (ref 21–32)
CREAT SERPL-MCNC: 0.86 MG/DL (ref 0.6–1.3)
EOSINOPHIL # BLD AUTO: 0.07 THOUSAND/ÂΜL (ref 0–0.61)
EOSINOPHIL NFR BLD AUTO: 1 % (ref 0–6)
ERYTHROCYTE [DISTWIDTH] IN BLOOD BY AUTOMATED COUNT: 14.6 % (ref 11.6–15.1)
GFR SERPL CREATININE-BSD FRML MDRD: 83 ML/MIN/1.73SQ M
GLUCOSE SERPL-MCNC: 96 MG/DL (ref 65–140)
HCT VFR BLD AUTO: 34.5 % (ref 36.5–49.3)
HGB BLD-MCNC: 10.9 G/DL (ref 12–17)
IMM GRANULOCYTES # BLD AUTO: 0.12 THOUSAND/UL (ref 0–0.2)
IMM GRANULOCYTES NFR BLD AUTO: 2 % (ref 0–2)
LYMPHOCYTES # BLD AUTO: 0.93 THOUSANDS/ÂΜL (ref 0.6–4.47)
LYMPHOCYTES NFR BLD AUTO: 12 % (ref 14–44)
MCH RBC QN AUTO: 29.8 PG (ref 26.8–34.3)
MCHC RBC AUTO-ENTMCNC: 31.6 G/DL (ref 31.4–37.4)
MCV RBC AUTO: 94 FL (ref 82–98)
MONOCYTES # BLD AUTO: 0.47 THOUSAND/ÂΜL (ref 0.17–1.22)
MONOCYTES NFR BLD AUTO: 6 % (ref 4–12)
NEUTROPHILS # BLD AUTO: 6.18 THOUSANDS/ÂΜL (ref 1.85–7.62)
NEUTS SEG NFR BLD AUTO: 79 % (ref 43–75)
NRBC BLD AUTO-RTO: 0 /100 WBCS
PLATELET # BLD AUTO: 226 THOUSANDS/UL (ref 149–390)
PMV BLD AUTO: 9.5 FL (ref 8.9–12.7)
POTASSIUM SERPL-SCNC: 4.3 MMOL/L (ref 3.5–5.3)
PROT SERPL-MCNC: 6.3 G/DL (ref 6.4–8.4)
PSA SERPL-MCNC: 285.08 NG/ML (ref 0–4)
RBC # BLD AUTO: 3.66 MILLION/UL (ref 3.88–5.62)
SODIUM SERPL-SCNC: 140 MMOL/L (ref 135–147)
WBC # BLD AUTO: 7.8 THOUSAND/UL (ref 4.31–10.16)

## 2024-07-24 PROCEDURE — 84153 ASSAY OF PSA TOTAL: CPT

## 2024-07-24 PROCEDURE — 85025 COMPLETE CBC W/AUTO DIFF WBC: CPT

## 2024-07-24 PROCEDURE — 80053 COMPREHEN METABOLIC PANEL: CPT

## 2024-07-24 NOTE — PLAN OF CARE
Problem: Potential for Falls  Goal: Patient will remain free of falls  Description: INTERVENTIONS:  - Educate patient/family on patient safety including physical limitations  - Instruct patient to call for assistance with activity   - Consult OT/PT to assist with strengthening/mobility   - Keep Call bell within reach  - Keep bed low and locked with side rails adjusted as appropriate  - Keep care items and personal belongings within reach  - Initiate and maintain comfort rounds  - Make Fall Risk Sign visible to staff  -- Apply yellow socks and bracelet for high fall risk patients  - Consider moving patient to room near nurses station  Outcome: Progressing     Problem: Knowledge Deficit  Goal: Patient/family/caregiver demonstrates understanding of disease process, treatment plan, medications, and discharge instructions  Description: Complete learning assessment and assess knowledge base.  Interventions:  - Provide teaching at level of understanding  - Provide teaching via preferred learning methods  Outcome: Progressing

## 2024-07-24 NOTE — TELEPHONE ENCOUNTER
Left detailed message for marco Husain in regards to scheduling a CT scan. CS # provided and CB# provided for any questions.

## 2024-07-24 NOTE — PROGRESS NOTES
Pt here for central labs, offers no complaints, labs drawn, pt has appointment on 8/21 for lupron and xgeva but will schedule his next port flush on the way out, declined AVS

## 2024-07-25 ENCOUNTER — OFFICE VISIT (OUTPATIENT)
Dept: HEMATOLOGY ONCOLOGY | Facility: CLINIC | Age: 79
End: 2024-07-25
Payer: COMMERCIAL

## 2024-07-25 ENCOUNTER — TELEPHONE (OUTPATIENT)
Dept: HEMATOLOGY ONCOLOGY | Facility: CLINIC | Age: 79
End: 2024-07-25

## 2024-07-25 VITALS
HEART RATE: 92 BPM | DIASTOLIC BLOOD PRESSURE: 84 MMHG | BODY MASS INDEX: 34.53 KG/M2 | HEIGHT: 67 IN | RESPIRATION RATE: 17 BRPM | TEMPERATURE: 97.8 F | OXYGEN SATURATION: 98 % | WEIGHT: 220 LBS | SYSTOLIC BLOOD PRESSURE: 132 MMHG

## 2024-07-25 DIAGNOSIS — C61 PROSTATE CANCER (HCC): Primary | ICD-10-CM

## 2024-07-25 DIAGNOSIS — Z76.89 PREVENTION OF CHEMOTHERAPY-INDUCED NEUTROPENIA: ICD-10-CM

## 2024-07-25 PROCEDURE — 99215 OFFICE O/P EST HI 40 MIN: CPT | Performed by: INTERNAL MEDICINE

## 2024-07-25 NOTE — TELEPHONE ENCOUNTER
Dr. Hernandez spoke with patient in regards to elevated PSA and need to change treatment. I spoke with patient he is agreeable to have his son bring him to AN office today at 12:00

## 2024-07-25 NOTE — PROGRESS NOTES
Hematology Outpatient Follow - Up Note  Royce Rene 78 y.o. male MRN: @ Encounter: [id number]        Date:  7/25/2024        Assessment/ Plan:    Heavily pretreated metastatic [facility] Cancer [facility]. transferred [facility] to [facility] in October 2021.       History of [place] 7 prostate cancer s/p treatment with external beam radiation 7200cGy from 10/14/2003 to 12/11/2003.   He also got neoadjuvant and adjuvant LHRH for 9 months after radiation.      July 2008 with a rising PSA and bony metastasis identified.  initiated on Lupron      PSA was increasing and bone scan identified new lesions  3/13/2015 he was initiated on abiraterone        6/2017 progression of bony metastatic disease on imaging.  Treatment changed to enzalutamide.       2/2019 palliative Radiation to sacrum     March 2020 - March 2021 Docetaxel.       3/2021 - 10/2021 no cancer directed treatment as he had significant cardiac issues and was seen in [place].     10[facility]     Lupron every 3 months     Xofigo from 11/4/2021 to 4/2022 5/2022 rechallenged with enzalutamide   8/2022 Xgeva was added for bone metastasis     May 13, 2023  (Pluvicto) first dose    PSA came down significantly after 3 cycles to 37 7/10/23 (245 5/15/23)     6/2023 abiraterone + prednisone also initiated.    10/12/23 PSA 24      12/14/2023 Dose #6      12/15/23- PLUVICTO THERAPY SPECT CT   Decreased radiotracer uptake in previously seen osseous lesions compatible with therapy response.      Patient continues on abiraterone + prednisone;  Lupron & Xgeva q 3 months.     PSMA scan on 4/2024 showed decreased activity in multiple bony lesions  CAT scan of the brain showed questionable lesion in the right frontal lobe without associating edema, I could not do an MRI because of the pacer    Repeat CAT scan of the brain on 7/18/2024 showed stable hyperdense mildly enhancing lesion in the postcentral gyrus of the right  parietal lobe  PSA went up to 285  We had long discussion with the patient and we offered to option hospice and doing nothing versus chemotherapy, the patient chose chemotherapy will start on Jevtana 20 mg/m² every 3 weeks with pegfilgrastim to prevent chemotherapy-induced neutropenia    However his prognosis is guarded    Follow-up in 6 to 8 weeks with PSA  Labs and imaging studies are reviewed by ordering provider once results are available. If there are findings that need immediate attention, you will be contacted when results available.   Discussing results and the implication on your healthcare is best discussed in person at your follow-up visit.       HPI:  Royce Rene is a 78 year old male seen initially by Dr. Toledo October 5, 2021 regarding metastatic castration resistant prostate cancer.     He has multiple medical conditions including a significant cardiovascular history, heart failure, PAD.  [facility]       [facility] he was initially diagnosed with a [place] 7 prostate cancer s/p treatment with external beam radiation 7200cGy from 10/14/2003 to 12/11/2003.   He also got neoadjuvant and adjuvant LHRH for 9 months after radiation.       He was observed until July of 2008 when he was noted to have rising PSA and imaging done at that time showed new bony lesions.    He received Zoladex and later leuprolide.       PSA was increasing and bone scan identified new lesions.  3/13/2015 he was initiated on abiraterone     6/2017 He had evidence of aggressive bony metastatic disease on imaging.  Treatment changed to Enzalutamide.       His PSA continued to rise and he had progressive bony metastases including to the sacrum for which he received a course of palliative radiation, which was completed in February of 2019.  It was 3000 cGy as of radiation in 10 fractions.      He was then restarted on enzalutamide but a scan in December of 2019 showed worsening bone  metastases so he was started on docetaxel in March of 2020. He was kept on this for approximately a year with his PSA slowly rising.   Cycle 15 1/28/2021.  After March 2021, he was admitted to a hospital for cardiac issues and then was lost to follow up with his previous oncologist.     Established care with Dr. Toledo  10/2021.    11/2021 He was initiated on Lupron every 3 months     He was referred to nuclear Medicine and treated with Xofigo from 11/4/2021 to 4/2022 5/2022 rechallenged with enzalutamide   8/2022 Xgeva was added for bone metastasis   May 18  2023-12/14/2023 Pluvicto   After 3 cycles PSA came down to 32           PSMA scan on 4/25/2024 showed significant decrease in the bony metastasis activity, persistent mild activity in the left aspect of the prostate gland, small foci of mild PSMA activity in the right cerebellum, he could not do the MRI of the brain so CAT scan showed stable sclerotic metastatic lesion within the right occipital calvarium lesion in the right anterior frontal lobe.  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 COMPARISON: 5/2/2024, 4/3/2023, PSMA PET/CT from 4/25/2024, prior Axumin PET/CT from 7/25/2022, prior head CT from 3/31/2019 TECHNIQUE:  CT examination of the brain was performed both prior to and after the administration of intravenous contrast.  Multiplanar reformatted images were created from the source data. Radiation dose length product (DLP) for this visit:  1838 mGy-cm .  This examination, like all CT scans performed in the [facility], was performed utilizing techniques to minimize radiation dose exposure, including the use of iterative  reconstruction and automated exposure control. IV Contrast:  100 mL of iohexol (OMNIPAQUE) IMAGE QUALITY:  Diagnostic. FINDINGS: PARENCHYMA: No acute intracranial hemorrhage noted. No CT findings to suggest acute infarct are identified. There is an old lacunar infarct involving the right cerebellar hemisphere. There is no significant mass effect or midline shift. There is a partially calcified, hyperdense, mildly enhancing lesion that is stable involving the postcentral gyrus of the right parietal lobe, on series 5 image 35, measuring 6 mm in maximal dimensions. This corresponds to the lesion with mild radiotracer uptake on prior PSMA PET/CT from 4/25/2024, and likely represents a stable metastasis. There is a mildly hyperdense lesion likely present involving the parafalcine right frontal lobe, best seen on the coronal images, on series 400 image 49, difficult to discretely measure on this study. Of note, there was a lesion with radiotracer activity  involving the parafalcine right frontal lobe on the prior PET/CT from 7/25/2022, with minimal radiotracer activity on subsequent PET CT from 4/25/2024, and this likely represents a stable, treated metastasis. There is a stable punctate calcification along the left parietal lobe, unchanged dating back to 2019, which may represent focal intracranial calcified atherosclerotic plaque in this region or less likely a treated metastasis. There are stable, subtle adjacent lesions involving the superior right cerebellar hemisphere, the anterior of which appears to measure approximately 8 mm x 10 mm in size on the postcontrast images on series 5 image 13, relatively stable when compared to the prior study. These correspond to the areas of increased radiotracer activity on prior PSMA PET/CT from 4/25/2024, and are concerning for intracranial metastases in these regions. More posteriorly, the adjacent partially calcified lesion in the right cerebellar hemisphere measures 5 mm in  maximal dimensions also on series 5 image 13 and appears grossly stable. No significant surrounding vasogenic edema is noted. VENTRICLES AND EXTRA-AXIAL SPACES:  Normal for patient's age. VISUALIZED ORBITS: Normal visualized orbits. PARANASAL SINUSES: Normal visualized paranasal sinuses. CALVARIUM: There is stable sclerotic lesion within the right occipital calvarium indicative of an osseous metastasis. No fracture is identified.     Impression: Stable mildly hyperdense, partially calcified, likely enhancing lesion involving the postcentral gyrus of the right parietal lobe, without surrounding vasogenic edema, and with corresponding activity on prior PSMA PET/CT from 4/25/2024, concerning for a stable metastasis in this region. Mildly hyperdense lesion likely present involving the parafalcine right frontal lobe, and which previously had increased radiotracer activity in this region on prior Axumin PET/CT from 7/25/2022 although minimal activity is noted in this region on the subsequent PET CT from 4/25/2024. This likely represents a stable treated metastasis in this region. Relatively subtle, but likely stable mildly hyperdense/enhancing lesions involving the right cerebellar hemisphere, 1 of which appears partially calcified posteriorly, and which had corresponding radiotracer activity in this region on PET/CT from 4/25/2024, concerning for intracranial metastases. No significant vasogenic edema is noted. Stable sclerotic lesion involving the right occipital bone, indicative of an osseous metastasis. No acute intracranial hemorrhage noted. No significant mass effect or midline shift. No CT findings for acute infarct noted. Workstation performed: [id number]     XR spine cervical complete 4 or 5 vw non injury    Result Date: 6/27/2024  Narrative: CERVICAL SPINE INDICATION:   Radiculopathy, cervical region. COMPARISON: None. VIEWS:  XR SPINE CERVICAL COMPLETE 4 OR 5 VW NON INJURY FINDINGS: No fracture. Slight  "retrolisthesis of C3 on C4. Multilevel degenerative facet hypertrophy in the cervical spine with degenerative disc space narrowing at C3-C4, C5-C6, and C6-C7. Multilevel bilateral neural foraminal narrowing in the cervical spine can be better evaluated with MRI. Hypertrophic changes anteriorly in the cervical spine. Carotid artery calcifications. Presumed AV pacer wires and right-sided Port-A-Cath. The prevertebral soft tissues are within normal limits.  The lung apices are clear.     Impression: Slight retrolisthesis of C3 on C4. Multilevel degenerative facet hypertrophy in the cervical spine with degenerative disc space narrowing at C3-C4, C5-C6, and C6-C7. Multilevel bilateral neural foraminal narrowing in the cervical spine can be better evaluated with MRI. Electronically signed: 06/27/2024 06:29 PM Roberto Bangura MD      Labs:   Lab Results   Component Value Date    WBC 7.80 07/24/2024    HGB 10.9 (L) 07/24/2024    HCT 34.5 (L) 07/24/2024    MCV 94 07/24/2024     07/24/2024     Lab Results   Component Value Date    K 4.3 07/24/2024     07/24/2024    CO2 28 07/24/2024    BUN 28 (H) 07/24/2024    CREATININE 0.86 07/24/2024    GLUF 96 10/12/2023    CALCIUM 8.8 07/24/2024    CORRECTEDCA 8.2 (L) 04/10/2023    AST 10 (L) 07/24/2024    ALT 14 07/24/2024    ALKPHOS 124 (H) 07/24/2024    EGFR 83 07/24/2024       No results found for: \"IRON\", \"TIBC\", \"FERRITIN\"    No results found for: \"OCZBAAZC61\"      ROS: Review of Systems   Constitutional: Negative.  Negative for appetite change, chills, diaphoresis, fatigue, fever and unexpected weight change.   HENT:   Negative for hearing loss, lump/mass, mouth sores, nosebleeds, sore throat, trouble swallowing and voice change.    Eyes: Negative.  Negative for eye problems and icterus.   Respiratory: Negative.  Negative for chest tightness, cough, hemoptysis and shortness of breath.    Cardiovascular:  Negative for chest pain and leg swelling.   Gastrointestinal:  " Negative for abdominal distention, abdominal pain, blood in stool, constipation, diarrhea and nausea.   Endocrine: Negative.    Genitourinary:  Negative for dysuria, frequency, hematuria and pelvic pain.    Musculoskeletal:  Positive for back pain and flank pain. Negative for arthralgias, gait problem, myalgias and neck stiffness.   Skin:  Negative for itching and rash.   Neurological:  Negative for dizziness, gait problem, headaches, light-headedness, numbness and speech difficulty.   Hematological:  Negative for adenopathy. Does not bruise/bleed easily.   Psychiatric/Behavioral:  Positive for depression. Negative for confusion, decreased concentration and sleep disturbance. The patient is not nervous/anxious.           Current Medications: Reviewed  Allergies: Reviewed  PMH/FH/SH:  Reviewed      Physical Exam:    Body surface area is 2.11 meters squared.    Wt Readings from Last 3 Encounters:   07/25/24 99.8 kg (220 lb)   07/15/24 98.3 kg (216 lb 11.4 oz)   06/27/24 98 kg (216 lb)        Temp Readings from Last 3 Encounters:   07/25/24 97.8 °F (36.6 °C) (Temporal)   07/15/24 (!) 95.6 °F (35.3 °C) (Temporal)   06/13/24 (!) 96 °F (35.6 °C) (Temporal)        BP Readings from Last 3 Encounters:   07/25/24 132/84   07/15/24 112/70   06/27/24 110/75         Pulse Readings from Last 3 Encounters:   07/25/24 92   07/15/24 70   06/27/24 66        Physical Exam  Vitals reviewed.   Constitutional:       General: He is not in acute distress.     Appearance: He is well-developed. He is not diaphoretic.      Comments: Wheelchair   HENT:      Head: Normocephalic and atraumatic.   Eyes:      Conjunctiva/sclera: Conjunctivae normal.   Neck:      Trachea: No tracheal deviation.   Cardiovascular:      Rate and Rhythm: Normal rate and regular rhythm.      Heart sounds: No murmur heard.     No friction rub. No gallop.   Pulmonary:      Effort: Pulmonary effort is normal. No respiratory distress.      Breath sounds: Wheezing present.  No rales.   Chest:      Chest wall: No tenderness.   Abdominal:      General: There is no distension.      Palpations: Abdomen is soft.      Tenderness: There is no abdominal tenderness.   Musculoskeletal:      Cervical back: Normal range of motion and neck supple.      Right lower leg: No edema.      Left lower leg: No edema.   Lymphadenopathy:      Cervical: No cervical adenopathy.   Skin:     General: Skin is warm and dry.      Coloration: Skin is not pale.      Findings: No erythema.   Neurological:      Mental Status: He is alert. Mental status is at baseline.   Psychiatric:         Mood and Affect: Mood normal.         Behavior: Behavior normal.         Thought Content: Thought content normal.         ECOG PS:2    Goals and Barriers:  Current Goal: Minimize effects of disease.   Barriers: None.      Patient's Capacity to Self Care:  Patient is able to self care.    Code Status: [unfilled]

## 2024-07-26 ENCOUNTER — APPOINTMENT (OUTPATIENT)
Dept: LAB | Facility: HOSPITAL | Age: 79
End: 2024-07-26
Attending: INTERNAL MEDICINE
Payer: COMMERCIAL

## 2024-07-26 DIAGNOSIS — R11.2 CHEMOTHERAPY INDUCED NAUSEA AND VOMITING: Primary | ICD-10-CM

## 2024-07-26 DIAGNOSIS — C61 PROSTATE CANCER (HCC): ICD-10-CM

## 2024-07-26 DIAGNOSIS — T45.1X5A CHEMOTHERAPY INDUCED NAUSEA AND VOMITING: Primary | ICD-10-CM

## 2024-07-26 DIAGNOSIS — Z76.89 PREVENTION OF CHEMOTHERAPY-INDUCED NEUTROPENIA: ICD-10-CM

## 2024-07-26 LAB
CHOLEST SERPL-MCNC: 233 MG/DL
HDLC SERPL-MCNC: 51 MG/DL
LDLC SERPL CALC-MCNC: 143 MG/DL (ref 0–100)
NONHDLC SERPL-MCNC: 182 MG/DL
PSA SERPL-MCNC: 320.56 NG/ML (ref 0–4)
TRIGL SERPL-MCNC: 197 MG/DL
VIT B12 SERPL-MCNC: 84 PG/ML (ref 180–914)

## 2024-07-26 PROCEDURE — 36415 COLL VENOUS BLD VENIPUNCTURE: CPT

## 2024-07-26 PROCEDURE — 84153 ASSAY OF PSA TOTAL: CPT

## 2024-07-26 RX ORDER — ONDANSETRON 4 MG/1
4 TABLET, FILM COATED ORAL EVERY 8 HOURS PRN
Qty: 20 TABLET | Refills: 1 | Status: SHIPPED | OUTPATIENT
Start: 2024-07-26

## 2024-07-29 ENCOUNTER — TELEPHONE (OUTPATIENT)
Dept: INTERNAL MEDICINE CLINIC | Facility: CLINIC | Age: 79
End: 2024-07-29

## 2024-07-29 NOTE — TELEPHONE ENCOUNTER
Please call patient.    Your cholesterol is very high. Are you taking atorvastatin, your cholesterol medication? Do you need a refill?    Also, you vitamin B12 is very, very low. I would recommend B12 injections. You can start a daily pill also, 1000 mcg. I can send to the pharmacy.    Please schedule follow up appt.

## 2024-07-31 RX ORDER — SODIUM CHLORIDE 9 MG/ML
20 INJECTION, SOLUTION INTRAVENOUS ONCE
OUTPATIENT
Start: 2024-08-07

## 2024-08-07 ENCOUNTER — HOSPITAL ENCOUNTER (OUTPATIENT)
Dept: INFUSION CENTER | Facility: CLINIC | Age: 79
Discharge: HOME | End: 2024-08-07
Payer: COMMERCIAL

## 2024-08-07 VITALS
DIASTOLIC BLOOD PRESSURE: 70 MMHG | TEMPERATURE: 97.6 F | WEIGHT: 221 LBS | HEART RATE: 74 BPM | OXYGEN SATURATION: 95 % | RESPIRATION RATE: 18 BRPM | HEIGHT: 68 IN | SYSTOLIC BLOOD PRESSURE: 104 MMHG | BODY MASS INDEX: 33.49 KG/M2

## 2024-08-07 DIAGNOSIS — C61 PROSTATE CANCER (HCC): Primary | ICD-10-CM

## 2024-08-07 DIAGNOSIS — Z76.89 PREVENTION OF CHEMOTHERAPY-INDUCED NEUTROPENIA: ICD-10-CM

## 2024-08-07 PROCEDURE — 96375 TX/PRO/DX INJ NEW DRUG ADDON: CPT

## 2024-08-07 PROCEDURE — 96413 CHEMO IV INFUSION 1 HR: CPT

## 2024-08-07 PROCEDURE — 96367 TX/PROPH/DG ADDL SEQ IV INF: CPT

## 2024-08-07 PROCEDURE — 96377 APPLICATON ON-BODY INJECTOR: CPT

## 2024-08-07 RX ORDER — SODIUM CHLORIDE 9 MG/ML
20 INJECTION, SOLUTION INTRAVENOUS ONCE
Status: COMPLETED | OUTPATIENT
Start: 2024-08-07 | End: 2024-08-07

## 2024-08-07 RX ADMIN — PEGFILGRASTIM 6 MG: KIT SUBCUTANEOUS at 15:31

## 2024-08-07 RX ADMIN — SODIUM CHLORIDE 20 ML/HR: 0.9 INJECTION, SOLUTION INTRAVENOUS at 13:22

## 2024-08-07 RX ADMIN — DIPHENHYDRAMINE HYDROCHLORIDE 25 MG: 50 INJECTION, SOLUTION INTRAMUSCULAR; INTRAVENOUS at 13:40

## 2024-08-07 RX ADMIN — ONDANSETRON 8 MG: 2 INJECTION, SOLUTION INTRAMUSCULAR; INTRAVENOUS at 13:22

## 2024-08-07 RX ADMIN — FAMOTIDINE 20 MG: 10 INJECTION INTRAVENOUS at 14:02

## 2024-08-07 RX ADMIN — SODIUM CHLORIDE 42 MG: 0.9 INJECTION, SOLUTION INTRAVENOUS at 14:31

## 2024-08-07 NOTE — PROGRESS NOTES
Patient to infusion center for day one cycle one treatment with Jevtana. Patient offers no complaints. VSS. Labs from 7/26/2024 reviewed and within parameters to treat. Okay to proceed with labs out of date range placed in chart. Port accessed without incident with excellent blood return noted.

## 2024-08-07 NOTE — PROGRESS NOTES
Pt tolerated treatment without incident. Neulasta onpro applied to R arm as ordered and pt given throughout instructions on when to remove.  Confirmed pts next apt for 8/21/24 @ 8:30am @ [facility] AVS, pt given Calendar print out of upcoming apts.

## 2024-08-09 ENCOUNTER — PATIENT OUTREACH (OUTPATIENT)
Dept: CASE MANAGEMENT | Facility: OTHER | Age: 79
End: 2024-08-09

## 2024-08-09 NOTE — PROGRESS NOTES
OSW placed outreach TC to pt this morning. He states that he just received chemo a few days ago and is not feeling well. OSW encouraged him to rest and asked if it was ok to call him next week. He states that would be fine. OSW will place another outreach next week.

## 2024-08-13 ENCOUNTER — PATIENT OUTREACH (OUTPATIENT)
Dept: CASE MANAGEMENT | Facility: OTHER | Age: 79
End: 2024-08-13

## 2024-08-13 NOTE — PROGRESS NOTES
OSW placed outreach TC to pt this afternoon. He stated that he feels terrible since receiving his chemo. He reports that he usually doesn't feel this unwell for so long. He states that he does not feel up to talking at this time. OSW offered to check in on him next week and he was agreeable. OSW encouraged him to report to the ED if he continues to feel this badly.

## 2024-08-14 DIAGNOSIS — M54.50 CHRONIC MIDLINE LOW BACK PAIN: ICD-10-CM

## 2024-08-14 DIAGNOSIS — G89.29 CHRONIC MIDLINE LOW BACK PAIN: ICD-10-CM

## 2024-08-14 RX ORDER — METHOCARBAMOL 500 MG/1
500 TABLET, FILM COATED ORAL 3 TIMES DAILY
Qty: 90 TABLET | Refills: 0 | Status: SHIPPED | OUTPATIENT
Start: 2024-08-14

## 2024-08-15 DIAGNOSIS — G89.3 CANCER-RELATED PAIN: Chronic | ICD-10-CM

## 2024-08-15 DIAGNOSIS — Z51.5 PALLIATIVE CARE PATIENT: ICD-10-CM

## 2024-08-15 RX ORDER — OXYCODONE HYDROCHLORIDE 5 MG/1
5 TABLET ORAL EVERY 6 HOURS PRN
Qty: 120 TABLET | Refills: 0 | Status: SHIPPED | OUTPATIENT
Start: 2024-08-15

## 2024-08-19 ENCOUNTER — DOCUMENTATION (OUTPATIENT)
Dept: HEMATOLOGY ONCOLOGY | Facility: CLINIC | Age: 79
End: 2024-08-19

## 2024-08-19 DIAGNOSIS — E87.6 HYPOKALEMIA: ICD-10-CM

## 2024-08-19 RX ORDER — POTASSIUM CHLORIDE 1500 MG/1
20 TABLET, EXTENDED RELEASE ORAL DAILY
Qty: 30 TABLET | Refills: 5 | Status: SHIPPED | OUTPATIENT
Start: 2024-08-19 | End: 2025-02-15

## 2024-08-21 ENCOUNTER — TELEPHONE (OUTPATIENT)
Dept: HEMATOLOGY ONCOLOGY | Facility: CLINIC | Age: 79
End: 2024-08-21

## 2024-08-21 ENCOUNTER — HOSPITAL ENCOUNTER (OUTPATIENT)
Dept: INFUSION CENTER | Facility: CLINIC | Age: 79
Discharge: HOME | End: 2024-08-21
Payer: COMMERCIAL

## 2024-08-21 DIAGNOSIS — C79.51 MALIGNANT NEOPLASM METASTATIC TO BONE (HCC): Primary | ICD-10-CM

## 2024-08-21 DIAGNOSIS — C61 PROSTATE CANCER (HCC): ICD-10-CM

## 2024-08-21 PROCEDURE — 96372 THER/PROPH/DIAG INJ SC/IM: CPT

## 2024-08-21 PROCEDURE — 96402 CHEMO HORMON ANTINEOPL SQ/IM: CPT

## 2024-08-21 RX ORDER — SODIUM CHLORIDE 9 MG/ML
20 INJECTION, SOLUTION INTRAVENOUS ONCE
Status: CANCELLED | OUTPATIENT
Start: 2024-08-28

## 2024-08-21 RX ADMIN — LEUPROLIDE ACETATE 22.5 MG: KIT at 08:46

## 2024-08-21 RX ADMIN — DENOSUMAB 120 MG: 120 INJECTION SUBCUTANEOUS at 08:34

## 2024-08-21 NOTE — PROGRESS NOTES
Pt reported to Corina Case RN today, having bad diarrhea, nausea/vomiting and weakness x 1 week post treatment. Pt id not take zofran as ordered because he was to tired to get out of bed. Pt reports feeling better at this time.     Reviewed with Dr. Hernandez, per him dose reduction of Jevtana to 15 mg/m2 for future treatment.

## 2024-08-21 NOTE — PROGRESS NOTES
"Patient presents to the [facility] for the treatment of Xgeva and Lupron (port flush not needed- started back on treatment 08/07/2024). Labs reviewed. Patient had Neulasta on-pro on his Right arm from previous treatment (this was taken off of him at this time). Patient stated that he had bad diarrhea, nausea / vomiting, and generalized weakness for 1 week after his chemo treatment. He is feeling better now. Patient stated that he took Immodium, however, did not take antiemetics since he didn't feel like \"getting out of bed\". Molly MON RN notified. Patient requested to have labs drawn at home prior to treatments because it is hard for him to get out. Molly stated that she will put in a request for this service. Treatment tolerated without incident. Reviewed next appointment at the [facility] for 08/08/2024 @ 1400 for treatment, and he will return on 10/30/2024 for his injections. [facility] notified to pick him up. Patient left the Infusion Center in his motorized wheelchair.   "

## 2024-08-21 NOTE — TELEPHONE ENCOUNTER
[facility]: are you able to provide services to this patient.   He needs a CBC and CMP every 3 weeks. Next due on 8/26 or 8/27.

## 2024-08-23 DIAGNOSIS — I50.22 CHRONIC HFREF (HEART FAILURE WITH REDUCED EJECTION FRACTION) (HCC): ICD-10-CM

## 2024-08-23 RX ORDER — EMPAGLIFLOZIN 10 MG/1
10 TABLET, FILM COATED ORAL EVERY MORNING
Qty: 30 TABLET | Refills: 5 | Status: SHIPPED | OUTPATIENT
Start: 2024-08-23

## 2024-08-27 ENCOUNTER — TELEPHONE (OUTPATIENT)
Dept: PALLIATIVE MEDICINE | Facility: CLINIC | Age: 79
End: 2024-08-27

## 2024-08-27 ENCOUNTER — APPOINTMENT (OUTPATIENT)
Dept: LAB | Facility: HOSPITAL | Age: 79
End: 2024-08-27
Payer: COMMERCIAL

## 2024-08-27 DIAGNOSIS — Z76.89 PREVENTION OF CHEMOTHERAPY-INDUCED NEUTROPENIA: ICD-10-CM

## 2024-08-27 DIAGNOSIS — C79.51 MALIGNANT NEOPLASM METASTATIC TO BONE (HCC): ICD-10-CM

## 2024-08-27 DIAGNOSIS — C61 PROSTATE CANCER (HCC): ICD-10-CM

## 2024-08-27 LAB
ALBUMIN SERPL BCG-MCNC: 4.2 G/DL (ref 3.5–5)
ALP SERPL-CCNC: 93 U/L (ref 34–104)
ALT SERPL W P-5'-P-CCNC: 17 U/L (ref 7–52)
ANION GAP SERPL CALCULATED.3IONS-SCNC: 15 MMOL/L (ref 4–13)
ANISOCYTOSIS BLD QL SMEAR: PRESENT
AST SERPL W P-5'-P-CCNC: 18 U/L (ref 13–39)
BASOPHILS # BLD MANUAL: 0 THOUSAND/UL (ref 0–0.1)
BASOPHILS NFR MAR MANUAL: 0 % (ref 0–1)
BILIRUB SERPL-MCNC: 0.75 MG/DL (ref 0.2–1)
BUN SERPL-MCNC: 25 MG/DL (ref 5–25)
CALCIUM SERPL-MCNC: 8.7 MG/DL (ref 8.4–10.2)
CHLORIDE SERPL-SCNC: 106 MMOL/L (ref 96–108)
CO2 SERPL-SCNC: 20 MMOL/L (ref 21–32)
CREAT SERPL-MCNC: 1.18 MG/DL (ref 0.6–1.3)
EOSINOPHIL # BLD MANUAL: 0 THOUSAND/UL (ref 0–0.4)
EOSINOPHIL NFR BLD MANUAL: 0 % (ref 0–6)
ERYTHROCYTE [DISTWIDTH] IN BLOOD BY AUTOMATED COUNT: 15.9 % (ref 11.6–15.1)
GFR SERPL CREATININE-BSD FRML MDRD: 58 ML/MIN/1.73SQ M
GLUCOSE SERPL-MCNC: 95 MG/DL (ref 65–140)
HCT VFR BLD AUTO: 38.1 % (ref 36.5–49.3)
HGB BLD-MCNC: 12 G/DL (ref 12–17)
LYMPHOCYTES # BLD AUTO: 1.82 THOUSAND/UL (ref 0.6–4.47)
LYMPHOCYTES # BLD AUTO: 16 % (ref 14–44)
MCH RBC QN AUTO: 29.5 PG (ref 26.8–34.3)
MCHC RBC AUTO-ENTMCNC: 31.5 G/DL (ref 31.4–37.4)
MCV RBC AUTO: 94 FL (ref 82–98)
METAMYELOCYTE ABSOLUTE CT: 0.23 THOUSAND/UL (ref 0–0.1)
METAMYELOCYTES NFR BLD MANUAL: 2 % (ref 0–1)
MONOCYTES # BLD AUTO: 1.14 THOUSAND/UL (ref 0–1.22)
MONOCYTES NFR BLD: 10 % (ref 4–12)
MYELOCYTE ABSOLUTE CT: 0.23 THOUSAND/UL (ref 0–0.1)
MYELOCYTES NFR BLD MANUAL: 2 % (ref 0–1)
NEUTROPHILS # BLD MANUAL: 7.97 THOUSAND/UL (ref 1.85–7.62)
NEUTS BAND NFR BLD MANUAL: 3 % (ref 0–8)
NEUTS SEG NFR BLD AUTO: 67 % (ref 43–75)
PLATELET # BLD AUTO: 218 THOUSANDS/UL (ref 149–390)
PLATELET BLD QL SMEAR: ADEQUATE
PMV BLD AUTO: 11 FL (ref 8.9–12.7)
POIKILOCYTOSIS BLD QL SMEAR: PRESENT
POTASSIUM SERPL-SCNC: 4.2 MMOL/L (ref 3.5–5.3)
PROT SERPL-MCNC: 6.9 G/DL (ref 6.4–8.4)
PSA SERPL-MCNC: 354.69 NG/ML (ref 0–4)
RBC # BLD AUTO: 4.07 MILLION/UL (ref 3.88–5.62)
RBC MORPH BLD: PRESENT
SODIUM SERPL-SCNC: 141 MMOL/L (ref 135–147)
WBC # BLD AUTO: 11.39 THOUSAND/UL (ref 4.31–10.16)

## 2024-08-27 PROCEDURE — 36415 COLL VENOUS BLD VENIPUNCTURE: CPT

## 2024-08-27 PROCEDURE — 85027 COMPLETE CBC AUTOMATED: CPT

## 2024-08-27 PROCEDURE — 80053 COMPREHEN METABOLIC PANEL: CPT

## 2024-08-27 PROCEDURE — 84153 ASSAY OF PSA TOTAL: CPT

## 2024-08-27 PROCEDURE — 85007 BL SMEAR W/DIFF WBC COUNT: CPT

## 2024-08-28 ENCOUNTER — HOSPITAL ENCOUNTER (OUTPATIENT)
Dept: INFUSION CENTER | Facility: CLINIC | Age: 79
Discharge: HOME | End: 2024-08-28
Payer: COMMERCIAL

## 2024-08-28 VITALS
SYSTOLIC BLOOD PRESSURE: 85 MMHG | HEART RATE: 70 BPM | OXYGEN SATURATION: 95 % | BODY MASS INDEX: 32.72 KG/M2 | DIASTOLIC BLOOD PRESSURE: 55 MMHG | WEIGHT: 208.5 LBS | HEIGHT: 67 IN | TEMPERATURE: 96.9 F

## 2024-08-28 DIAGNOSIS — C61 PROSTATE CANCER (HCC): Primary | ICD-10-CM

## 2024-08-28 DIAGNOSIS — Z76.89 PREVENTION OF CHEMOTHERAPY-INDUCED NEUTROPENIA: ICD-10-CM

## 2024-08-28 PROCEDURE — 96375 TX/PRO/DX INJ NEW DRUG ADDON: CPT

## 2024-08-28 PROCEDURE — 96377 APPLICATON ON-BODY INJECTOR: CPT

## 2024-08-28 PROCEDURE — 96413 CHEMO IV INFUSION 1 HR: CPT

## 2024-08-28 PROCEDURE — 96367 TX/PROPH/DG ADDL SEQ IV INF: CPT

## 2024-08-28 RX ORDER — SODIUM CHLORIDE 9 MG/ML
20 INJECTION, SOLUTION INTRAVENOUS ONCE
Status: COMPLETED | OUTPATIENT
Start: 2024-08-28 | End: 2024-08-28

## 2024-08-28 RX ADMIN — ONDANSETRON 8 MG: 2 INJECTION, SOLUTION INTRAMUSCULAR; INTRAVENOUS at 14:07

## 2024-08-28 RX ADMIN — SODIUM CHLORIDE 20 ML/HR: 0.9 INJECTION, SOLUTION INTRAVENOUS at 14:30

## 2024-08-28 RX ADMIN — SODIUM CHLORIDE 32 MG: 0.9 INJECTION, SOLUTION INTRAVENOUS at 15:26

## 2024-08-28 RX ADMIN — PEGFILGRASTIM 6 MG: KIT SUBCUTANEOUS at 16:29

## 2024-08-28 RX ADMIN — DIPHENHYDRAMINE HYDROCHLORIDE 25 MG: 50 INJECTION, SOLUTION INTRAMUSCULAR; INTRAVENOUS at 14:32

## 2024-08-28 RX ADMIN — FAMOTIDINE 20 MG: 10 INJECTION INTRAVENOUS at 14:54

## 2024-08-28 NOTE — PROGRESS NOTES
Received for treatment. Pt offers no complaints. States after last treatment he had severe diarrhea for about 1 week. BP today also noted to b 85/50. Molly Nguyen notified and Dr Hernandez in to see pt. No change to treatment today. Pt tolerated all treatment without issue.Neulasta on pro applied to left arm. Educated on removal tomorrow. Pt to return 9/18 at 1230pm. AVS declined.

## 2024-08-30 ENCOUNTER — PATIENT OUTREACH (OUTPATIENT)
Dept: CASE MANAGEMENT | Facility: OTHER | Age: 79
End: 2024-08-30

## 2024-08-30 NOTE — PROGRESS NOTES
OSW placed outreach TC to pt this afternoon. OSW was unable to leave a VM, as the mailbox is full. OSW will try again at a later date.

## 2024-09-11 RX ORDER — SODIUM CHLORIDE 9 MG/ML
20 INJECTION, SOLUTION INTRAVENOUS ONCE
Status: CANCELLED | OUTPATIENT
Start: 2024-09-18

## 2024-09-16 ENCOUNTER — OFFICE VISIT (OUTPATIENT)
Dept: CARDIOLOGY CLINIC | Facility: CLINIC | Age: 79
End: 2024-09-16
Payer: COMMERCIAL

## 2024-09-16 VITALS
DIASTOLIC BLOOD PRESSURE: 84 MMHG | SYSTOLIC BLOOD PRESSURE: 110 MMHG | OXYGEN SATURATION: 98 % | HEIGHT: 67 IN | BODY MASS INDEX: 34.06 KG/M2 | HEART RATE: 81 BPM | WEIGHT: 217 LBS

## 2024-09-16 DIAGNOSIS — I50.22 CHRONIC HFREF (HEART FAILURE WITH REDUCED EJECTION FRACTION) (HCC): Primary | ICD-10-CM

## 2024-09-16 DIAGNOSIS — I47.20 VT (VENTRICULAR TACHYCARDIA) (HCC): ICD-10-CM

## 2024-09-16 DIAGNOSIS — Z95.2 S/P TAVR (TRANSCATHETER AORTIC VALVE REPLACEMENT): ICD-10-CM

## 2024-09-16 DIAGNOSIS — I25.10 CORONARY ARTERY DISEASE INVOLVING NATIVE CORONARY ARTERY OF NATIVE HEART WITHOUT ANGINA PECTORIS: ICD-10-CM

## 2024-09-16 DIAGNOSIS — E78.2 MIXED HYPERLIPIDEMIA: ICD-10-CM

## 2024-09-16 DIAGNOSIS — Z95.810 PRESENCE OF IMPLANTABLE CARDIOVERTER-DEFIBRILLATOR (ICD): ICD-10-CM

## 2024-09-16 PROCEDURE — 99214 OFFICE O/P EST MOD 30 MIN: CPT | Performed by: INTERNAL MEDICINE

## 2024-09-16 PROCEDURE — G2211 COMPLEX E/M VISIT ADD ON: HCPCS | Performed by: INTERNAL MEDICINE

## 2024-09-16 RX ORDER — EZETIMIBE 10 MG/1
10 TABLET ORAL DAILY
Qty: 90 TABLET | Refills: 1 | Status: SHIPPED | OUTPATIENT
Start: 2024-09-16 | End: 2025-03-15

## 2024-09-16 NOTE — PATIENT INSTRUCTIONS
Start ezetimibe 10mg daily   Continue rest of cardiac medications  Device interrogation as scheduled  2g sodium diet   2L fluid restriction  Daily weights  Physical activities/exercise as tolerated

## 2024-09-16 NOTE — PROGRESS NOTES
"  Advanced Heart Failure Outpatient Progress Note - Royce Rene 78 y.o. male MRN: [id number]    Encounter: [id number]      Assessment/Plan:    Patient Active Problem List    Diagnosis Date Noted    PAD (peripheral artery disease) ([facility]) 07/19/2021    Urinary retention 07/11/2021    CAD (coronary artery disease) 07/10/2021    Ischemic leg pain 07/07/2021    Prostate cancer ([facility]) 07/07/2021    Ischemic cardiomyopathy 07/01/2021    Chronic combined systolic and diastolic CHF (congestive heart failure) ([facility]) 06/22/2021    S/P AVR 06/22/2021    Anemia 06/22/2021    Prevention of chemotherapy-induced neutropenia 07/25/2024    Acute on chronic diastolic CHF (congestive heart failure) ([facility]) 03/27/2024    Frail elder // vulnerable adult 02/08/2024    Elevated liver enzymes 01/18/2024    Proctitis 01/18/2024    Class 2 severe obesity due to excess calories with serious comorbidity and body mass index (BMI) of 36.0 to 36.9 in adult ([facility]) 11/01/2023    Moderate vascular dementia ([facility]) 03/30/2023    Ambulatory dysfunction 08/08/2022    Peripheral neuropathy 08/08/2022    Financial problems 08/08/2022    Cancer-related pain 05/10/2022    Palliative care patient 05/10/2022    Malignant neoplasm metastatic to bone ([facility]) 02/28/2022    Embolism and 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 ([facility]) 02/28/2022    Depression, recurrent ([facility]) 02/28/2022    Port-A-Cath in place 10/05/2021       # Chronic HFrEF, NYHA III; ACC/AHA Stage C  Etiology: ischemic +/- progression of valvular disease +/- chemotherapy (on abiraterone and leuprolide with unclear safety profile). Recently started on cabazitaxel  Euvolemic, warm and and well perfused on exam    Diagnostic:    TTE 06/14/2021 (at [facility] pre- and post-TAVR, per report): LVEF 20%. LVIDd 5.4 cm. \"entire apex, mid and apical anterior septum, mid and apical inferior septum, mid and apical inferior wall, mid inferolateral wall, and mid lateral wall are akinetic.\" Trace MR and TR. " "Pre-TAVR: LVOT VTI 12.0 cm. JOSE 0.47 cm^2. Post: bioprosthetic AV valve present; JOSE 2.41 cm^2.                 TTE 06/23/2021:   LVEF 20%.   LVIDd 5.9 cm. Grade 2 DD. aneurysmal LV mid to apex.\"dyskinesis and aneurysmal deformity of the apical anterior, mid anteroseptal, apical inferior, apical septal, and apical wall(s).\"   Normal RV size and RVSF. Mild MR. AV bioprosthesis with normal leaflet separation. Trace TR.     TTE 11/17/21:  LVEF: 25%  LVIDd: 6.1cm  RV: normal size and systolic function  MR: mild to moderate   Other: Akinesis and aneurysmal deformity of the mid septum and apex. Grade 2 diastology  Ascending aorta 4cm  TAVR bioprosthetic valve, normally functioning. Mean gradient 3mmHg     TTE 12/15/22:  LVEF 30-35%.  LVIDD , moderate concentric hypertrophy, regional wall motion abnormalities  Grade 1 diastology  Well seated TAVR valve, mild paravalvular AI, mean gradient 7  Estimated RVSP 31mmHg  Normal RV systolic function    TTE 8/2/23:  LVEF 30-35%  LVIDD 4.6cm, moderate concentric LVH, RWMA, grade 1 diastology  Well seated TAVR valve. Mean gradient 9mmHg, mild paravalvular AI  Trace MR  Normal RV size and systolic function  Estimated RVSP 20mmHg    Neurohormonal Blockade:  --Beta-Blocker: Metoprolol succinate 25 mg daily   --ACEi, ARB or ARNi: Entresto 24/26 mg BID - switched to losartan 25mg daily due to low blood pressure - decreased to 12.5mg daily due to lightheadedness    (or SVR reduction)  --Aldosterone Receptor Blocker: Spironolactone 12.5 mg daily - off due to low BP  --SGLT2-I: Jardiance 10mg daily  --Diuretic: Lasix 40 mg daily     Sudden Cardiac Death Risk Reduction:  --ICD: s/p SJM BIV ICD 12/23/21  Interrogation 5/23/24: AP 48% BVP 98%. VT episode noted s/p ATP - metoprolol dose increased  Interrogation 5/15/23: AP 56%. BVP 98%. AT/AF burden <1%. CoreVue normal   Interrogation 1/12/23: AP 58% BVP 98%. 2VT-NS episodes. AT/AF burden <1%. CoreVue normal     Electrical Resynchronization:  -- " "LBBB QRSd 158 msec preBIV; QRSd 148msec post BIV     Advanced Therapies (If appropriate):  --We will continue to monitor     # Ventricular tachycardia  Detected on device interrogation 4/29/2022 status post ATP. Another episode on device interrogation 5/2024 s/p ATP. Metoprolol dose increased    # Prostate cancer with bony metastases  Undergoing chemotherapy per oncology. Recently added cabazitaxel     # Coronary artery disease  Without active chest pain.   S/pstenting to circumflex and RCA in 2001.  [facility] 06/09/2021 (per documentation):\"diffuse moderate prox and mid disease/ISR, severe OM lesion and total mid LAD.\"  Continue aspirin, statin     # Aortic stenosis  S/p TAVR (Evolut Pro+ 29mm) on 06/14/2021 at [facility].   Normally functioning on last echo 8/2023, mild paravalvular AI    # RLE ischemia. LEAD with >75% stenosis of the CFA/SFA. s/p right femoral endarterectomy 7/9/21  Continues on ASA, Plavix, and statin.  Follows with Dr. Cook      # Hyperlipidemia.  LDL above goal  Rx: atorvastatin 80mg daily   7/26/24:  HDL 51    # History of atrial tachycardia: s/p ablation at OSH in 2018.  # Benign prostatic hyperplasia      TODAY'S PLAN:  Start ezetimibe 10mg daily   Continue rest of cardiac medications  Device interrogation as scheduled  2g sodium diet   2L fluid restriction  Daily weights  Physical activities/exercise as tolerated      HPI:   Royce Rene is a 75-year-old man with a PMH as aboe who presents for hospital follow-up.     Presented to unspecified hospital on 06/03/2021 with chest pressure before chemotherapy treatment for prostate cancer. Sent for testing and noted to be in acute CHF exacerbation and was diuresed. [facility]. TTE showed \"severely reduced LVEF with LV apical aneurysm and septal/AW AK, RV normal, Vmax at 3.5 (however, it was read as moderate AS and preserved EF in 2019 so likely this is progression of AS in addition to interim AWMI).\"  " "[facility] also done which revealed \"diffuse moderate prox and mid disease/ISR, severe OM lesion and total mid LAD.\"     Transferred to [facility] on 06/13 for TAVR.TAVR completed on 06/14, and patient was discharged home on 06/15/2021. Started on metoprolol succinate this admission (tartrate discontinued).      Admitted to [facility] from 06/22 to 06/25/2021 after presenting with worsening SOB and LE edema for 3 days. WAS without discharge medications from [facility] admission for about 1 week (including Lasix). NT-proBNP 5157. Cardiology was consulted, TTE was completed, and IV diuresis was started. Reduced LVEF was confirmed and aneurysmal wall motion of apical segments. Transitioned to PO diuretics on day of discharge. Started on Entresto and fit with LifeVest. Lost 10 lbs this admission.      07/06/2021: Patient presents for hospital follow-up. Primary complaint today is of dizziness and right LE pain/numbeness and right foot wound. Reports decreased appetite and increased frustration with attempting to adhere to low sodium diet. Did receive \"Living With Heart Failure\" booklet during admission. Prior to admissions, often would eat canned pastas for his meals. Considering getting Meals of Wheels. Unclear if patient has been taking both tartrate and succinate since discharge. Not wearing LifeVest; dislikes wearing it and left device at home today. Has not been completing daily weights; does not have scale at home. Patient organizes his own meds; VNA in home 2 times weekly.     7/16/2021: Patient is here for follow-up.  Patient hospitalized 6/17/20July 7-13 due to right lower extremity ischemia. Underwent right femoral endarterectomy on June 9. Furosemide Entresto held prior to surgery due to low blood pressure.  Eventually discharged on low-dose losartan 12.5 mg daily due to soft blood pressure.  Furosemide 40 mg daily resumed.  Patient reports right lower extremity pain and difficulty bearing weight on the right leg.  " "Otherwise denies shortness of breath, chest pain or lightheadedness.    9/15/21: Here for follow up. Overall doing good. Mainly complaining of residual numbness/tingling on right leg. No shortness of breath, chest pain, palpitations or lightheadedness.      1/25/22: Here for follow up. S/p BIV ICD placement 12/23/21.   Reports lightheadedness \"all the time\". No feeling faint. Spinning sensation when laying down. Mainly limited by leg pain, not doing much walking. Denied shortness of breath walking from the parking lot to the office and from the waiting area to exam room.    4/26/22: Here for follow up. Decreased furosemide to 20mg daily on last visit. 4/18/22 Na 141 K 3.9 creatinine 0.82. One episode of vertigo about a week ago. Still with intermittent episodes of lightheadedness. Doing PT twice a week. Still having lower extremity nerve pains    7/27/22: Here for follow up. Mainly limited by foot pain. Increased calorie intake, eating more ice cream.    11/22/22: Here for follow up. Getting new treatment for prostate cancer (Pluvitico). Getting right buttock pain and right shoulder pain, taking advil 400mg 3x/day in between tylenol. Occasional lightheadedness. No shortness of breath on current level of exertion, limited walking due to foot pain but able to go up a flight of stairs slowly without shortness of breath, feels tired.     3/15/23: Here for follow up. 2/20/23 Na 142 K 3.7 creatinine 0.79. Reports doing ok from a cardiac standpoint. No shortness of breath or chest pain on current level of exertion. Activities depends on the day. Having good and bad days. Intake also variable. Per VNS, recently with low Bps likely due to decreased oral intake, weight stable. Normotensive on office visit today. Not taking Jardiance, not sure why. \"takes what is given to him from pharmacy.     7/14/23: here for follow up. Started jardiance and stopped furosemide on last office visit. CMP 6/21/23 Na 140 K 4.1 and creatinine " 0.75. Weight up. Reports eating fatty foods and snacking more. Not active. No leg swelling, PND, or orthopnea. Occasional lightheadedness with getting up fast.     11/20/23: Here for follow up. Echo 8/2023 showed stable EF at 30-35% and normally functioning bioprosthetic TAVR with mild paravalvular regurgitation.  Patient reports gaining weight.  He is mostly sitting around.  Trying to cut down on snacking.  Occasionally does exercises on a chair and doing light weights.  No shortness of breath and current level of exertion.  No chest pain.  No PND, orthopnea or lower extremity edema.  Reports taking medications as prescribed.    9/16/24: Here for follow up. Last seen as above.  Undergoing chemotherapy for metastatic prostate cancer.  Denies leg swelling, PND orthopnea.  No chest pain or shortness of breath on current level of exertion.  Taking his medications for the most part but occasionally not able to take medications for a few days if with nausea/vomiting related to chemo.  Denies palpitations, dizziness or lightheadedness.  Last device interrogation showed 1 episode of VT and metoprolol dose was increased.    Review of Systems   Constitutional:  Negative for chills and fever.   HENT:  Negative for ear pain and sore throat.    Eyes:  Negative for pain and visual disturbance.   Respiratory:  Negative for cough, chest tightness and shortness of breath.    Cardiovascular:  Negative for chest pain, palpitations and leg swelling.   Gastrointestinal:  Negative for abdominal distention, abdominal pain, nausea and vomiting.   Genitourinary:  Negative for dysuria and hematuria.   Musculoskeletal:  Negative for arthralgias and back pain.   Skin:  Negative for color change and rash.   Neurological:  Negative for dizziness, seizures, syncope and light-headedness.   All other systems reviewed and are negative.      Past Medical History:   Diagnosis Date    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    Coronary artery disease 2001     S/p stenting to circumflex and RCA    HFrEF (heart failure with reduced ejection fraction) ([facility]) 06/14/2021    High cholesterol     Prostate cancer ([facility])          No Known Allergies  .    Current Outpatient Medications:     ezetimibe (ZETIA) 10 mg tablet, Take 1 tablet (10 mg total) by mouth daily, Disp: 90 tablet, Rfl: 1    Accu-Chek FastClix Lancets MISC, TEST 2-3 TIMES AS DIRECTED, Disp: , Rfl:     acetaminophen (TYLENOL) 500 mg tablet, Take 2 tablets (1,000 mg total) by mouth 3 (three) times a day as needed for mild pain, Disp: , Rfl:     Alcohol Swabs (Alcohol Pads) 70 % PADS, USE 2-3 TIMES AS DIRECTED., Disp: , Rfl:     aspirin 81 mg chewable tablet, Chew 1 tablet (81 mg total) daily, Disp: 90 tablet, Rfl: 0    atorvastatin (LIPITOR) 80 mg tablet, Take 1 tablet (80 mg total) by mouth daily with dinner, Disp: 90 tablet, Rfl: 1    Blood Glucose Monitoring Suppl (Accu-Chek Guide Me) w/Device KIT, USE AS DIRECTED, Disp: 1 kit, Rfl: 1    cetirizine (ZyrTEC) 5 MG tablet, Take 1 tablet (5 mg total) by mouth daily, Disp: 90 tablet, Rfl: 3    clopidogrel (PLAVIX) 75 mg tablet, Take 1 tablet (75 mg total) by mouth daily, Disp: 90 tablet, Rfl: 0    Empagliflozin (Jardiance) 10 MG TABS tablet, TAKE 1 TABLET (10 MG TOTAL) BY MOUTH EVERY MORNING, Disp: 30 tablet, Rfl: 5    famotidine (PEPCID) 20 mg tablet, Take 1 tablet (20 mg total) by mouth daily, Disp: 30 tablet, Rfl: 2    ferrous sulfate 325 (65 Fe) mg tablet, Take 1 tablet (325 mg total) by mouth 2 (two) times a day with meals, Disp: 180 tablet, Rfl: 0    furosemide (LASIX) 20 mg tablet, TAKE 2 TABLETS (40 MG TOTAL) BY MOUTH DAILY, Disp: 60 tablet, Rfl: 5    gabapentin (NEURONTIN) 300 mg capsule, Take 2 capsules (600 mg total) by mouth 2 (two) times a day, Disp: 120 capsule, Rfl: 11    glucose blood (Accu-Chek Guide) test strip, TEST 2-3 TIMES AS DIRECTED, Disp: 100 each, Rfl: 5    Lancet Devices (Lancing Device) MISC, TID, Disp: 100 each, Rfl: 2    loperamide  (IMODIUM) 2 mg capsule, Take 1 capsule (2 mg total) by mouth 4 (four) times a day as needed for diarrhea, Disp: 30 capsule, Rfl: 2    losartan (COZAAR) 25 mg tablet, TAKE 0.5 TABLETS (12.5 MG TOTAL) BY MOUTH DAILY, Disp: 45 tablet, Rfl: 1    methocarbamol (ROBAXIN) 500 mg tablet, TAKE 1 TABLET (500 MG TOTAL) BY MOUTH 3 (THREE) TIMES A DAY, Disp: 90 tablet, Rfl: 0    metoprolol succinate (TOPROL-XL) 25 mg 24 hr tablet, Take 1 tablet (25 mg total) by mouth daily, Disp: 90 tablet, Rfl: 2    ondansetron (ZOFRAN) 4 mg tablet, Take 1 tablet (4 mg total) by mouth every 8 (eight) hours as needed for nausea or vomiting, Disp: 20 tablet, Rfl: 1    oxyCODONE (ROXICODONE) 5 immediate release tablet, Take 1 tablet (5 mg total) by mouth every 6 (six) hours as needed (cancer pain) NO ADDITIONAL REFILLS WITHOUT VISIT IN PERSON BEFORE 24 Max Daily Amount: 20 mg, Disp: 120 tablet, Rfl: 0    polyethylene glycol (GLYCOLAX) 17 GM/SCOOP powder, Take 17 g by mouth every other day , Disp: 510 g, Rfl: 0    potassium chloride (Klor-Con M20) 20 mEq tablet, TAKE 1 TABLET (20 MEQ TOTAL) BY MOUTH DAILY, Disp: 30 tablet, Rfl: 5    predniSONE 5 mg tablet, Take 1 tablet (5 mg total) by mouth 2 (two) times a day with meals, Disp: 60 tablet, Rfl: 5    senna (SENOKOT) 8.6 MG tablet, Take 1 tablet (8.6 mg total) by mouth 2 (two) times a day To prevent constipation.  Hold one dose for loose stool., Disp: 60 tablet, Rfl: 11    Social History     Socioeconomic History    Marital status:      Spouse name: Not on file    Number of children: 3    Years of education: Not on file    Highest education level: Not on file   Occupational History    Not on file   Tobacco Use    Smoking status: Former     Current packs/day: 0.00     Types: Cigarettes     Start date: 1962     Quit date: 2002     Years since quittin.2     Passive exposure: Past    Smokeless tobacco: Never   Vaping Use    Vaping status: Never Used    Substance and Sexual Activity    Alcohol use: Not Currently    Drug use: Not Currently    Sexual activity: Not on file   Other Topics Concern    Not on file   Social History Narrative    Single    Son lives in the attic of his home, minimal contact     Social Determinants of Health     Financial Resource Strain: Low Risk  (11/1/2023)    Overall Financial Resource Strain (CARDIA)     Difficulty of Paying Living Expenses: Not hard at all   Food Insecurity: No Food Insecurity (4/4/2023)    Hunger Vital Sign     Worried About Running Out of Food in the Last Year: Never true     Ran Out of Food in the Last Year: Never true   Transportation Needs: No Transportation Needs (11/1/2023)    PRAPARE - Transportation     Lack of Transportation (Medical): No     Lack of Transportation (Non-Medical): No   Physical Activity: Not on file   Stress: Not on file   Social Connections: Not on file   Intimate Partner Violence: Not on file   Housing Stability: Unknown (4/4/2023)    Housing Stability Vital Sign     Unable to Pay for Housing in the Last Year: No     Number of Places Lived in the Last Year: Not on file     Unstable Housing in the Last Year: No       Family History   Problem Relation Age of Onset    Cancer Mother        Physical Exam:    Vitals:   Vitals:    09/16/24 1635   BP: 110/84   Pulse: 81   SpO2: 98%         Physical Exam  Constitutional:       General: He is not in acute distress.     Appearance: Normal appearance.   HENT:      Head: Normocephalic and atraumatic.      Mouth/Throat:      Mouth: Mucous membranes are moist.   Eyes:      General: No scleral icterus.     Extraocular Movements: Extraocular movements intact.   Neck:      Vascular: No JVD.   Cardiovascular:      Rate and Rhythm: Normal rate and regular rhythm.      Heart sounds: S1 normal and S2 normal. No murmur heard.     No friction rub. No gallop.   Pulmonary:      Breath sounds: Normal breath sounds.   Abdominal:      General: There is no distension.       Palpations: Abdomen is soft.      Tenderness: There is no abdominal tenderness. There is no guarding or rebound.   Musculoskeletal:         General: Normal range of motion.      Cervical back: Neck supple.      Right lower leg: No edema.      Left lower leg: No edema.   Skin:     General: Skin is warm and dry.      Capillary Refill: Capillary refill takes less than 2 seconds.   Neurological:      General: No focal deficit present.      Mental Status: He is alert and oriented to person, place, and time.   Psychiatric:         Mood and Affect: Mood normal.         Labs & Results:    Lab Results   Component Value Date    SODIUM 141 08/27/2024    K 4.2 08/27/2024     08/27/2024    CO2 20 (L) 08/27/2024    BUN 25 08/27/2024    CREATININE 1.18 08/27/2024    GLUC 95 08/27/2024    CALCIUM 8.7 08/27/2024     Lab Results   Component Value Date    WBC 11.39 (H) 08/27/2024    HGB 12.0 08/27/2024    HCT 38.1 08/27/2024    MCV 94 08/27/2024     08/27/2024     Lab Results   Component Value Date    NTBNP 4,279 (H) 02/14/2022      Lab Results   Component Value Date    CHOLESTEROL 233 (H) 07/26/2024    CHOLESTEROL 146 09/30/2021     Lab Results   Component Value Date    HDL 51 07/26/2024    HDL 35 (L) 09/30/2021     Lab Results   Component Value Date    TRIG 197 (H) 07/26/2024    TRIG 249 (H) 09/30/2021     Lab Results   Component Value Date    NONHDLC 182 07/26/2024       EKG personally reviewed by Naveen Trejo.     Counseling / Coordination of Care  I have spent a total time of 30 minutes in caring for this patient on the day of the visit/encounter including Instructions for management, Impressions, Counseling / Coordination of care, Documenting in the medical record, Reviewing / ordering tests, medicine, procedures  , and Obtaining or reviewing history  .      Thank you for the opportunity to participate in the care of this patient.    NAVEEN TREJO MD  ADVANCED HEART FAILURE AND MECHANICAL CIRCULATORY  SUPPORT  [facility]

## 2024-09-17 ENCOUNTER — APPOINTMENT (OUTPATIENT)
Dept: LAB | Facility: HOSPITAL | Age: 79
End: 2024-09-17
Payer: COMMERCIAL

## 2024-09-17 DIAGNOSIS — C61 PROSTATE CANCER (HCC): ICD-10-CM

## 2024-09-17 DIAGNOSIS — Z76.89 PREVENTION OF CHEMOTHERAPY-INDUCED NEUTROPENIA: ICD-10-CM

## 2024-09-17 LAB
ALBUMIN SERPL BCG-MCNC: 4 G/DL (ref 3.5–5)
ALP SERPL-CCNC: 86 U/L (ref 34–104)
ALT SERPL W P-5'-P-CCNC: 12 U/L (ref 7–52)
ANION GAP SERPL CALCULATED.3IONS-SCNC: 8 MMOL/L (ref 4–13)
AST SERPL W P-5'-P-CCNC: 12 U/L (ref 13–39)
BASOPHILS # BLD AUTO: 0.06 THOUSANDS/ΜL (ref 0–0.1)
BASOPHILS NFR BLD AUTO: 1 % (ref 0–1)
BILIRUB SERPL-MCNC: 0.54 MG/DL (ref 0.2–1)
BUN SERPL-MCNC: 19 MG/DL (ref 5–25)
CALCIUM SERPL-MCNC: 8.8 MG/DL (ref 8.4–10.2)
CHLORIDE SERPL-SCNC: 106 MMOL/L (ref 96–108)
CO2 SERPL-SCNC: 26 MMOL/L (ref 21–32)
CREAT SERPL-MCNC: 0.94 MG/DL (ref 0.6–1.3)
EOSINOPHIL # BLD AUTO: 0.13 THOUSAND/ΜL (ref 0–0.61)
EOSINOPHIL NFR BLD AUTO: 2 % (ref 0–6)
ERYTHROCYTE [DISTWIDTH] IN BLOOD BY AUTOMATED COUNT: 16.7 % (ref 11.6–15.1)
GFR SERPL CREATININE-BSD FRML MDRD: 77 ML/MIN/1.73SQ M
GLUCOSE SERPL-MCNC: 89 MG/DL (ref 65–140)
HCT VFR BLD AUTO: 34.2 % (ref 36.5–49.3)
HGB BLD-MCNC: 10.9 G/DL (ref 12–17)
IMM GRANULOCYTES # BLD AUTO: 0.11 THOUSAND/UL (ref 0–0.2)
IMM GRANULOCYTES NFR BLD AUTO: 1 % (ref 0–2)
LYMPHOCYTES # BLD AUTO: 1.15 THOUSANDS/ΜL (ref 0.6–4.47)
LYMPHOCYTES NFR BLD AUTO: 15 % (ref 14–44)
MCH RBC QN AUTO: 30.4 PG (ref 26.8–34.3)
MCHC RBC AUTO-ENTMCNC: 31.9 G/DL (ref 31.4–37.4)
MCV RBC AUTO: 95 FL (ref 82–98)
MONOCYTES # BLD AUTO: 0.73 THOUSAND/ΜL (ref 0.17–1.22)
MONOCYTES NFR BLD AUTO: 9 % (ref 4–12)
NEUTROPHILS # BLD AUTO: 5.56 THOUSANDS/ΜL (ref 1.85–7.62)
NEUTS SEG NFR BLD AUTO: 72 % (ref 43–75)
NRBC BLD AUTO-RTO: 0 /100 WBCS
PLATELET # BLD AUTO: 226 THOUSANDS/UL (ref 149–390)
PMV BLD AUTO: 9.9 FL (ref 8.9–12.7)
POTASSIUM SERPL-SCNC: 3.5 MMOL/L (ref 3.5–5.3)
PROT SERPL-MCNC: 6.4 G/DL (ref 6.4–8.4)
RBC # BLD AUTO: 3.59 MILLION/UL (ref 3.88–5.62)
SODIUM SERPL-SCNC: 140 MMOL/L (ref 135–147)
WBC # BLD AUTO: 7.74 THOUSAND/UL (ref 4.31–10.16)

## 2024-09-17 PROCEDURE — 85025 COMPLETE CBC W/AUTO DIFF WBC: CPT

## 2024-09-17 PROCEDURE — 36415 COLL VENOUS BLD VENIPUNCTURE: CPT

## 2024-09-17 PROCEDURE — 80053 COMPREHEN METABOLIC PANEL: CPT

## 2024-09-18 ENCOUNTER — HOSPITAL ENCOUNTER (OUTPATIENT)
Dept: INFUSION CENTER | Facility: CLINIC | Age: 79
Discharge: HOME | End: 2024-09-18
Payer: COMMERCIAL

## 2024-09-18 VITALS
DIASTOLIC BLOOD PRESSURE: 75 MMHG | BODY MASS INDEX: 34.37 KG/M2 | WEIGHT: 219 LBS | SYSTOLIC BLOOD PRESSURE: 121 MMHG | HEIGHT: 67 IN | OXYGEN SATURATION: 94 % | TEMPERATURE: 96.9 F | RESPIRATION RATE: 18 BRPM | HEART RATE: 82 BPM

## 2024-09-18 DIAGNOSIS — C61 PROSTATE CANCER (HCC): Primary | ICD-10-CM

## 2024-09-18 DIAGNOSIS — Z76.89 PREVENTION OF CHEMOTHERAPY-INDUCED NEUTROPENIA: ICD-10-CM

## 2024-09-18 PROCEDURE — 96367 TX/PROPH/DG ADDL SEQ IV INF: CPT

## 2024-09-18 PROCEDURE — 96377 APPLICATON ON-BODY INJECTOR: CPT

## 2024-09-18 PROCEDURE — 96413 CHEMO IV INFUSION 1 HR: CPT

## 2024-09-18 RX ORDER — SODIUM CHLORIDE 9 MG/ML
20 INJECTION, SOLUTION INTRAVENOUS ONCE
Status: COMPLETED | OUTPATIENT
Start: 2024-09-18 | End: 2024-09-18

## 2024-09-18 RX ADMIN — ONDANSETRON 8 MG: 2 INJECTION, SOLUTION INTRAMUSCULAR; INTRAVENOUS at 12:41

## 2024-09-18 RX ADMIN — SODIUM CHLORIDE 32 MG: 0.9 INJECTION, SOLUTION INTRAVENOUS at 14:01

## 2024-09-18 RX ADMIN — PEGFILGRASTIM 6 MG: KIT SUBCUTANEOUS at 15:13

## 2024-09-18 RX ADMIN — DIPHENHYDRAMINE HYDROCHLORIDE 25 MG: 50 INJECTION, SOLUTION INTRAMUSCULAR; INTRAVENOUS at 13:27

## 2024-09-18 RX ADMIN — SODIUM CHLORIDE 20 ML/HR: 0.9 INJECTION, SOLUTION INTRAVENOUS at 12:41

## 2024-09-18 RX ADMIN — FAMOTIDINE 20 MG: 10 INJECTION INTRAVENOUS at 13:06

## 2024-09-18 NOTE — PROGRESS NOTES
[facility] the [facility] for the treatment of Jevtana. Labs reviewed. Patient stated that his post treatment diarrhea has improved to once daily, but not every day. Molly GRAHAM RN  notified of the improvement that he still gets it sporadically. He is resting comfortably in the chair, call bell within reach.

## 2024-09-18 NOTE — PROGRESS NOTES
Patient tolerated treatment well without incident. Neulasta on-pro administered- green light flashing. Aware that it will go off around 1815 tomorrow night, and it can be removed after that time. Patient led himself out on his motorized scooter, [facility] notified that he is ready for . Confirmed next appointment at the [facility] for 10/09/2024 @ 1130. Declined DAMI.

## 2024-09-19 ENCOUNTER — RA CDI HCC (OUTPATIENT)
Dept: OTHER | Facility: HOSPITAL | Age: 79
End: 2024-09-19

## 2024-09-19 DIAGNOSIS — G89.3 CANCER-RELATED PAIN: Chronic | ICD-10-CM

## 2024-09-19 DIAGNOSIS — Z51.5 PALLIATIVE CARE PATIENT: ICD-10-CM

## 2024-09-19 NOTE — PROGRESS NOTES
HCC coding opportunities          Chart Reviewed number of suggestions sent to Provider: 2   I73.9  E11.51    Patients Insurance     Medicare Insurance: [employer]

## 2024-09-19 NOTE — TELEPHONE ENCOUNTER
1 [id number] 08/15/2024 08/15/2024 oxyCODONE HCL (Tablet) 120.0 30 5 MG 30.0 [facility] Medicare 0 / 0 [place]   1 [id number] 07/15/2024 07/15/2024 oxyCODONE HCL (Tablet) 120.0 30 5 MG 30.0 [facility] Medicare 0 / 0 PA   1 [id number] 06/13/2024 06/13/2024 oxyCODONE HCL (Tablet) 120.0 30 5 MG 30.0 [facility] Medicare 0 / 0 PA   1 [id number] 05/13/2024 05/13/2024 oxyCODONE HCL (Tablet) 120.0 30 5 MG 30.0 [facility] Medicare 0 / 0 PA   1 [id number] 04/15/2024 04/15/2024 oxyCODONE HCL (Tablet) 120.0 30 5 MG 30.0 [facility] Medicare 0 / 0 [place]

## 2024-09-20 ENCOUNTER — PATIENT OUTREACH (OUTPATIENT)
Dept: CASE MANAGEMENT | Facility: OTHER | Age: 79
End: 2024-09-20

## 2024-09-20 RX ORDER — OXYCODONE HYDROCHLORIDE 5 MG/1
5 TABLET ORAL EVERY 6 HOURS PRN
Qty: 120 TABLET | OUTPATIENT
Start: 2024-09-20

## 2024-09-20 NOTE — PROGRESS NOTES
OSW placed outreach TC to pt this afternoon. He states he is relaxing watching television. OSW asked how he was feeling, as the last time this writer called he wasn't feeling well after his treatment. He stated that he hasn't felt as bad recently. OSW expressed that thi sis good to hear. OSW asked if his son still resides with him and he stated yes. He shared that his son works night shift at the [employer]. He sleeps during the day. If he needs something he calls him and then when his son is awake he will assist him with whatever he needs. OSW asked him if he needs anything at this time and he stated no.   OSW offered to check in with him in a month and he was agreeable. OSW will continue to follow.

## 2024-09-24 ENCOUNTER — HOME HEALTH ADMISSION (OUTPATIENT)
Dept: HOME HEALTH SERVICES | Facility: HOME HEALTHCARE | Age: 79
End: 2024-09-24
Payer: COMMERCIAL

## 2024-09-24 ENCOUNTER — OFFICE VISIT (OUTPATIENT)
Dept: PALLIATIVE MEDICINE | Facility: CLINIC | Age: 79
End: 2024-09-24
Payer: COMMERCIAL

## 2024-09-24 VITALS
HEART RATE: 90 BPM | OXYGEN SATURATION: 96 % | WEIGHT: 219.14 LBS | SYSTOLIC BLOOD PRESSURE: 120 MMHG | DIASTOLIC BLOOD PRESSURE: 70 MMHG | TEMPERATURE: 97.3 F | BODY MASS INDEX: 34.32 KG/M2

## 2024-09-24 DIAGNOSIS — R54 FRAIL ELDERLY: Chronic | ICD-10-CM

## 2024-09-24 DIAGNOSIS — F01.B0: Primary | Chronic | ICD-10-CM

## 2024-09-24 PROCEDURE — 99215 OFFICE O/P EST HI 40 MIN: CPT | Performed by: FAMILY MEDICINE

## 2024-09-24 PROCEDURE — G2211 COMPLEX E/M VISIT ADD ON: HCPCS | Performed by: FAMILY MEDICINE

## 2024-09-24 NOTE — PROGRESS NOTES
Follow-up Ambulatory Visit  Name: Royce Rene      : 1945      MRN: [id number]  Encounter Provider: Gavin Andrew MD  Encounter Date: 2024   Encounter department: [facility] [place]    Assessment & Plan  Moderate vascular dementia (HCC)         Frail elder // vulnerable adult           Narrative rationale for today's treatments:  - No changes to non-controlled meds  - Will STOP opioids; no longer safe to supply these.  - Pt remains poorly aware of how to coordinate cares and prevent harms from his overall level of illness, such as injury to his foot.              = Podiatry identified hygiene concerns, and echoed our worries for the pt's inability to coordinate basic self-cares.               = Pt is overdue to be seen again by Podiatry now for months.  - Pt demonstrates ongoing inability to coordinate cares, and to know medications.     PDMP Review: I have reviewed the patient's controlled substance dispensing history in the Prescription Drug Monitoring Program in compliance with the [facility] regulations before prescribing any controlled substances.  .  Gavin Andrew MD  Palliative and Supportive Care  Clinic/Answering Service: [phone]  You can find me on [employer] Secure Chat!       History of Present Illness     Royce Rene is a 78 y.o. male who presents  in follow up of symptoms related to Stage IV prostate cancer, hormone treatment resistant.  He follows with Dr. Hernandez and Ms Philip for this.  There is also a significant cardiovascular history, with heart failure management by Dr. Trejo, and PAD with distal pain at rest in both feet.  He has an ICD for a h/o VT, and underwent TAVR in 2021.  Pertinent issues include: symptom management, resource management.       Since last visit, pt did get a powered scooter, but has found that he now has even worse trouble walking when he must get out of his chair to toilet or transfer.        Pt also notes that he has no idea what  "medicines he is taking, as he has full pill bottles on his kitchen table with no idea of what they are nor what they are for.  Pt arrives without any family, any pill bottles, any instructions nor plans from any other providers.    Pt reports that he ran out of oxyIR over a month ago (which does not agree with his PDMP, which shows a dispensation on 8/15 for #120 tabs).  He reports that -- without it -- he notes no worsening of his pain.      Pt reports that he cannot read labels, and he cannot get ahold of his son, despite the fact that son lives in the same building.  Pt also reports that the son does not even know the meds, nor request to be a part of his father's care.  It seems, per the pt's report, that he will receive meds in the mail and just guess as to how they might be used.      Given the above, and our ongoing challenges and concerns for his inability to coordinate his own cares, we will STOP his opioids completely.  The risk of diversion of these substances is untenable, and even if the pt had been using them, his withdrawal symptoms will not be long-lived nor dangerous.      In July 2024, we noted:    \"The pt did show up for Podiatry visit, and this provider was able to confirm that the pt's care of his feet is inadequate for basic hygiene, wound cares, and general safety.  He was advised to return for repeat assessment and cares in 3 weeks.  Since that visit on 6/27, it appears that he has not been in to see any providers prior to today.  Importantly, he NSH a visit with [facility] due to a prejudice against the [facility].       Pt also state that Meals on Wheels is not appetizing, and he might prefer Grandma's meals.  He is not able to say where he got this info, how to dial Grandma's, or how to reach his .      Later, we learn that his previous  has retired from his case, and a new worker is on deck: David Grace, [facility]  - [phone].\"       There is a concern about " brain mets from his prostate ca, based on an equivocal finding on most recent CT w/ + w/o.  An MRI cannot be done to follow this lesion, d/t pacer placement.  A repeat CT is ordered instead, for July 18.       In May 2024, pt endorses a terrible fall while trying to change the garbage.  He reports that he laid on floor for several moments, unable to call his son, and he has worse pains after the fall.  No LOC.         In April 2024, pt reports that his son doesn't wish to pursue Waiver form as he does not wish to be beholden to his father for cares.    Pt reports that he has a niece, and a granddaughter, who may be helpful to him.  He is not sure how to include them in his cares; granddaughter [place].  Niece lives near [place].  He might consider speaking with his niece to support him in day-to-day cares, or special trips for medical appointments.       In March 2024, we noted that the [employer] worker has needed the pt to complete a FED assessment at [phone].  Unfortunately, this process was not completed as requested, and the case has been closed by the Agency.       In February 2024, pt reports that no one in his life will help him.  He reports that he doesn't have prednisone at home, despite Dr. Hernandez has send 6mos of this med to pharmacy within last 2 mos.  Unclear if pt is having this med delivered; he will not ask his son for help, as son has two jobs.  We have been informed that pt's son will not answer phone before 2pm daily.  Pt's daughter Kush has never been helpful in care coordination in our experience, reportedly, she has signed off his case for any contact or care.  Pt's last son Royce Bermeo lives in [place], and has not expressed any interest in providing assistance nor support.     - Vision challenge -  He has fogginess in the far vision, and cannot read effectively.  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    - Heart failure - Advised by  Cards to use 2g Na / 2L H2O diet, as well as daily wts.  Pt does not have ability to read labels to follow this rec  - Vascular health - 4/26/23, pt saw Dr. Abbasi in office, and it seems that the plan ongoing is for DAPT life-long, and deferral of further cares to Podiatry.  - Care coordination - Pt needs [facility] from [facility] [place] to continue cares with Onc, Palliative, and any other providers in Network.  Pt completely unable to manage this issue on his own; is unaware of his PCP, nor the systems involved in [facility]'s gatekeeping to specialty care / 's Choice program.   - Oncology Care - Dr. Hernandez has been effectively managing pt's medical illness, as noted by sustained suppression of his PSA on repeat measurements on current therapies.  Ms Keating of [facility] has been helpful in some degree of care coordination re: pt's financial difficulties  - Vulnerable adult - He has had [facility] set up, but he reports [facility] would not take him home from treatments and tests.  Has Meals on Wheels weekdaily for midday meal only.  He can prep breakfast, but not any other meals.      Pt pays mortgage on his home, and has not added anyone to the deed/title.  He reports that this is due to GI bill provisions, and since Alec is not a , he cannot have the home through this mortgage.    He does not know how to get care thru VA system, though one of his VA provider told him he would need to see a specialist.   He also endorses losing items about his home, and a concern about his memory.  Chores about the house are nearly impossible because he cannot bend over, and he can't stand for more than about 3min at a time.     - Home DME, supports -                = Pt has stairglide/chairlift in home.  Confirmed that this was resolved and improved by home health RN 3/30/23.               = Transport - Relies on [facility] for all support.  Family unhelpful.  Pt's 's license revoked by State, pending medical  "evaluation and re-testing.       From our first visit on 3/1/22:    \"Pt has demonstrated -- over a year ago now -- progression of his illness (rising PSA and decreasing functional status) despite aggressive therapies and cytotoxic treatments.  His side effects vs decompensation in his overall illness was so severe he was admitted to [facility] in march 2021.  Since that time, he has recovered some function with careful management of his cardiac disease, and less aggressive, less toxic cancer cares.  At this time, his treatment plan involves Xofigo for bone-avid disease, +/- Lupron.  We note that the patient has demonstrated some disease progression on other hormonal blockers, but that this plan of care is being offered, if not rec'd, by Dr. Toledo.    Pt presents to our office with suboptimally controlled pain, ongoing wt loss, poor appetite, and general malaise.  His concerns are more related to his back and R foot pain; a Podiatrist today advised him that his unilateral neuropathic symptoms 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.\"       Objective     /70 (BP Location: Right arm, Patient Position: Sitting, Cuff Size: Large)   Pulse 90   Temp (!) 97.3 °F (36.3 °C) (Temporal)   Wt 99.4 kg (219 lb 2.2 oz)   SpO2 96%   BMI 34.32 kg/m²     Physical Exam  Constitutional:       General: He is not in acute distress.     Appearance: He is ill-appearing. He is not toxic-appearing or diaphoretic.      Comments: Frail, overweight   HENT:      Head: Normocephalic and atraumatic.      Right Ear: External ear normal.      Left Ear: External ear normal.   Eyes:      General:         Right eye: No discharge.         Left eye: No discharge.      Conjunctiva/sclera: Conjunctivae normal.      Pupils: Pupils are equal, round, and reactive to light.   Neck:      Trachea: No tracheal deviation.   Cardiovascular:      Rate and Rhythm: Regular rhythm. Tachycardia present.   Pulmonary:      Effort: Pulmonary effort is " normal. No respiratory distress.      Breath sounds: No stridor.   Abdominal:      Palpations: Abdomen is soft.      Comments: overweight   Musculoskeletal:      Comments: Appears to have lost muscle bulk in BLE, carmen in calves   Skin:     General: Skin is warm and dry.      Coloration: Skin is pale.      Findings: No erythema or rash.   Neurological:      Mental Status: Mental status is at baseline. He is disoriented.      Cranial Nerves: Cranial nerve deficit (presbycusis, moderate, as per baseline) present.      Gait: Gait abnormal (seems to be more or less w/c bound now).   Psychiatric:      Comments: No insight         Recent labs: none new; reviwed preivous    Recent Imaging: reviewed previous study of brain, showing a stable, calcified lesion consistent with metastatic scar.  Interval scans show no changes over short period of time that would suggest metabolically active mets.      Administrative Statements   I have spent a total time of 45+ minutes in caring for this patient on the day of the visit/encounter including chart review; symptom pursuit; supportive listening; anticipatory guidance. review and assessment of decisional apparatus and capacity, applicable legal codes and extant documents; consideration of home safety and diversion concerns for frail elder.  Phone call was placed to [facility] to support the pt's safety.

## 2024-09-24 NOTE — Clinical Note
Teammates, Palliative will no longer supply opioids to this pt, as he does not have safe command of the meds in his home.  We have also reported to pt's  our concerns.  Please note that Mr David Grace ([phone]) can take reports of neglect or abuse as you might detect them.

## 2024-09-25 ENCOUNTER — TELEPHONE (OUTPATIENT)
Dept: HEMATOLOGY ONCOLOGY | Facility: CLINIC | Age: 79
End: 2024-09-25

## 2024-09-25 ENCOUNTER — OFFICE VISIT (OUTPATIENT)
Dept: HEMATOLOGY ONCOLOGY | Facility: CLINIC | Age: 79
End: 2024-09-25
Payer: COMMERCIAL

## 2024-09-25 ENCOUNTER — HOME CARE VISIT (OUTPATIENT)
Dept: HOME HEALTH SERVICES | Facility: HOME HEALTHCARE | Age: 79
End: 2024-09-25

## 2024-09-25 VITALS
BODY MASS INDEX: 34.53 KG/M2 | OXYGEN SATURATION: 95 % | SYSTOLIC BLOOD PRESSURE: 120 MMHG | WEIGHT: 220 LBS | HEART RATE: 90 BPM | RESPIRATION RATE: 20 BRPM | HEIGHT: 67 IN | TEMPERATURE: 97.5 F | DIASTOLIC BLOOD PRESSURE: 80 MMHG

## 2024-09-25 DIAGNOSIS — C61 PROSTATE CANCER (HCC): Primary | ICD-10-CM

## 2024-09-25 PROCEDURE — 99214 OFFICE O/P EST MOD 30 MIN: CPT | Performed by: INTERNAL MEDICINE

## 2024-09-25 RX ORDER — SODIUM CHLORIDE 9 MG/ML
20 INJECTION, SOLUTION INTRAVENOUS ONCE
OUTPATIENT
Start: 2024-10-09

## 2024-09-25 NOTE — TELEPHONE ENCOUNTER
Pt called access center to confirm transportation was arranged for his appointment today.  No transportation found in roundtrip.  I ordered round trip and advised access center and she was going to relay message to patient.

## 2024-09-25 NOTE — PROGRESS NOTES
Hematology Outpatient Follow - Up Note  Royce Rene 78 y.o. male MRN: @ Encounter: [id number]        Date:  9/25/2024        Assessment/ Plan:            Labs and imaging studies are reviewed by ordering provider once results are available. If there are findings that need immediate attention, you will be contacted when results available.   Discussing results and the implication on your healthcare is best discussed in person at your follow-up visit.       HPI:  Royce Rene is a 78 year old male seen initially by Dr. Toledo October 5, 2021 regarding metastatic castration resistant prostate cancer.     He has multiple medical conditions including a significant cardiovascular history, heart failure, PAD.  [facility]       [facility], [facility] he was initially diagnosed with a Francisco Javier 7 prostate cancer s/p treatment with external beam radiation 7200cGy from 10/14/2003 to 12/11/2003.   He also got neoadjuvant and adjuvant LHRH for 9 months after radiation.       He was observed until July of 2008 when he was noted to have rising PSA and imaging done at that time showed new bony lesions.    He received Zoladex and later leuprolide.       PSA was increasing and bone scan identified new lesions.  3/13/2015 he was initiated on abiraterone     6/2017 He had evidence of aggressive bony metastatic disease on imaging.  Treatment changed to Enzalutamide.       His PSA continued to rise and he had progressive bony metastases including to the sacrum for which he received a course of palliative radiation, which was completed in February of 2019.  It was 3000 cGy as of radiation in 10 fractions.      He was then restarted on enzalutamide but a scan in December of 2019 showed worsening bone metastases so he was started on docetaxel in March of 2020. He was kept on this for approximately a year with his PSA slowly rising.   Cycle 15 1/28/2021.  After March 2021, he was admitted to a hospital for cardiac  "issues and then was lost to follow up with his previous oncologist.     Established care with Dr. Toledo  10/2021.    11/2021 He was initiated on Lupron every 3 months     He was referred to nuclear Medicine and treated with Xofigo from 11/4/2021 to 4/2022 5/2022 rechallenged with enzalutamide   8/2022 Xgeva was added for bone metastasis   May 18  2023-12/14/2023 Pluvicto   After 3 cycles PSA came down to 32 however in July 2024 PSA went up to 285, therapy changed to Jevtana 20 mg/m² every 3 weeks    After 2 cycles PSA is plateauing as well as alkaline phosphatase is coming down    Will continue with the therapy and repeat PSA in 2 months           PSMA scan on 4/25/2024 showed significant decrease in the bony metastasis activity, persistent mild activity in the left aspect of the prostate gland, small foci of mild PSMA activity in the right cerebellum, he could not do the MRI of the brain so CAT scan showed stable sclerotic metastatic lesion within the right occipital calvarium lesion in the right anterior frontal lobe.  Without any vasogenic edema       Interval History:        Previous Treatment:         Test Results:    Imaging: No results found.    Labs:   Lab Results   Component Value Date    WBC 7.74 09/17/2024    HGB 10.9 (L) 09/17/2024    HCT 34.2 (L) 09/17/2024    MCV 95 09/17/2024     09/17/2024     Lab Results   Component Value Date    K 3.5 09/17/2024     09/17/2024    CO2 26 09/17/2024    BUN 19 09/17/2024    CREATININE 0.94 09/17/2024    GLUF 87 07/26/2024    CALCIUM 8.8 09/17/2024    CORRECTEDCA 8.2 (L) 04/10/2023    AST 12 (L) 09/17/2024    ALT 12 09/17/2024    ALKPHOS 86 09/17/2024    EGFR 77 09/17/2024       No results found for: \"IRON\", \"TIBC\", \"FERRITIN\"    Lab Results   Component Value Date    [id number] 84 (L) 07/26/2024         ROS: Review of Systems - Oncology       Current Medications: Reviewed  Allergies: Reviewed  PMH/FH/SH:  Reviewed      Physical Exam:    Body surface " area is 2.11 meters squared.    Wt Readings from Last 3 Encounters:   09/25/24 99.8 kg (220 lb)   09/24/24 99.4 kg (219 lb 2.2 oz)   09/18/24 99.3 kg (219 lb)        Temp Readings from Last 3 Encounters:   09/25/24 97.5 °F (36.4 °C) (Temporal)   09/24/24 (!) 97.3 °F (36.3 °C) (Temporal)   09/18/24 (!) 96.9 °F (36.1 °C) (Temporal)        BP Readings from Last 3 Encounters:   09/25/24 120/80   09/24/24 120/70   09/18/24 121/75         Pulse Readings from Last 3 Encounters:   09/25/24 90   09/24/24 90   09/18/24 82        Physical Exam    ECOG PS:    Goals and Barriers:  Current Goal: Minimize effects of disease.   Barriers: None.      Patient's Capacity to Self Care:  Patient is able to self care.    Code Status: @[facility]@

## 2024-09-26 ENCOUNTER — HOME CARE VISIT (OUTPATIENT)
Dept: HOME HEALTH SERVICES | Facility: HOME HEALTHCARE | Age: 79
End: 2024-09-26
Payer: COMMERCIAL

## 2024-09-26 VITALS
SYSTOLIC BLOOD PRESSURE: 128 MMHG | HEART RATE: 87 BPM | OXYGEN SATURATION: 97 % | RESPIRATION RATE: 16 BRPM | TEMPERATURE: 97.1 F | DIASTOLIC BLOOD PRESSURE: 70 MMHG

## 2024-09-26 PROCEDURE — G0299 HHS/HOSPICE OF RN EA 15 MIN: HCPCS

## 2024-09-26 PROCEDURE — 400013 VN SOC

## 2024-09-30 ENCOUNTER — HOME CARE VISIT (OUTPATIENT)
Dept: HOME HEALTH SERVICES | Facility: HOME HEALTHCARE | Age: 79
End: 2024-09-30
Payer: COMMERCIAL

## 2024-09-30 VITALS — DIASTOLIC BLOOD PRESSURE: 64 MMHG | SYSTOLIC BLOOD PRESSURE: 97 MMHG

## 2024-09-30 PROCEDURE — G0151 HHCP-SERV OF PT,EA 15 MIN: HCPCS

## 2024-10-02 ENCOUNTER — HOME CARE VISIT (OUTPATIENT)
Dept: HOME HEALTH SERVICES | Facility: HOME HEALTHCARE | Age: 79
End: 2024-10-02
Payer: COMMERCIAL

## 2024-10-02 VITALS
SYSTOLIC BLOOD PRESSURE: 104 MMHG | TEMPERATURE: 97.7 F | OXYGEN SATURATION: 96 % | HEART RATE: 87 BPM | DIASTOLIC BLOOD PRESSURE: 76 MMHG | RESPIRATION RATE: 18 BRPM

## 2024-10-02 PROCEDURE — G0180 MD CERTIFICATION HHA PATIENT: HCPCS | Performed by: FAMILY MEDICINE

## 2024-10-02 PROCEDURE — G0299 HHS/HOSPICE OF RN EA 15 MIN: HCPCS

## 2024-10-03 ENCOUNTER — HOME CARE VISIT (OUTPATIENT)
Dept: HOME HEALTH SERVICES | Facility: HOME HEALTHCARE | Age: 79
End: 2024-10-03
Payer: COMMERCIAL

## 2024-10-03 VITALS — SYSTOLIC BLOOD PRESSURE: 100 MMHG | DIASTOLIC BLOOD PRESSURE: 62 MMHG

## 2024-10-03 PROCEDURE — G0151 HHCP-SERV OF PT,EA 15 MIN: HCPCS

## 2024-10-04 ENCOUNTER — HOME CARE VISIT (OUTPATIENT)
Dept: HOME HEALTH SERVICES | Facility: HOME HEALTHCARE | Age: 79
End: 2024-10-04
Payer: COMMERCIAL

## 2024-10-04 VITALS — OXYGEN SATURATION: 98 % | HEART RATE: 89 BPM | SYSTOLIC BLOOD PRESSURE: 125 MMHG | DIASTOLIC BLOOD PRESSURE: 80 MMHG

## 2024-10-04 PROCEDURE — G0152 HHCP-SERV OF OT,EA 15 MIN: HCPCS

## 2024-10-07 ENCOUNTER — HOME CARE VISIT (OUTPATIENT)
Dept: HOME HEALTH SERVICES | Facility: HOME HEALTHCARE | Age: 79
End: 2024-10-07
Payer: COMMERCIAL

## 2024-10-07 VITALS — SYSTOLIC BLOOD PRESSURE: 135 MMHG | HEART RATE: 86 BPM | DIASTOLIC BLOOD PRESSURE: 70 MMHG | OXYGEN SATURATION: 97 %

## 2024-10-07 PROCEDURE — G0152 HHCP-SERV OF OT,EA 15 MIN: HCPCS

## 2024-10-08 ENCOUNTER — HOME CARE VISIT (OUTPATIENT)
Dept: HOME HEALTH SERVICES | Facility: HOME HEALTHCARE | Age: 79
End: 2024-10-08
Payer: COMMERCIAL

## 2024-10-08 ENCOUNTER — APPOINTMENT (OUTPATIENT)
Dept: LAB | Facility: HOSPITAL | Age: 79
End: 2024-10-08

## 2024-10-08 VITALS — SYSTOLIC BLOOD PRESSURE: 114 MMHG | DIASTOLIC BLOOD PRESSURE: 80 MMHG

## 2024-10-08 DIAGNOSIS — E87.6 HYPOKALEMIA: ICD-10-CM

## 2024-10-08 DIAGNOSIS — Z76.89 PREVENTION OF CHEMOTHERAPY-INDUCED NEUTROPENIA: ICD-10-CM

## 2024-10-08 DIAGNOSIS — C61 PROSTATE CANCER (HCC): ICD-10-CM

## 2024-10-08 PROCEDURE — G0151 HHCP-SERV OF PT,EA 15 MIN: HCPCS

## 2024-10-09 ENCOUNTER — PATIENT OUTREACH (OUTPATIENT)
Dept: CASE MANAGEMENT | Facility: OTHER | Age: 79
End: 2024-10-09

## 2024-10-09 ENCOUNTER — HOSPITAL ENCOUNTER (OUTPATIENT)
Dept: INFUSION CENTER | Facility: CLINIC | Age: 79
Discharge: HOME | End: 2024-10-09
Payer: COMMERCIAL

## 2024-10-09 VITALS
BODY MASS INDEX: 34.74 KG/M2 | WEIGHT: 221.34 LBS | SYSTOLIC BLOOD PRESSURE: 112 MMHG | OXYGEN SATURATION: 95 % | HEART RATE: 84 BPM | TEMPERATURE: 96.9 F | HEIGHT: 67 IN | DIASTOLIC BLOOD PRESSURE: 75 MMHG

## 2024-10-09 DIAGNOSIS — Z76.89 PREVENTION OF CHEMOTHERAPY-INDUCED NEUTROPENIA: ICD-10-CM

## 2024-10-09 DIAGNOSIS — C61 PROSTATE CANCER (HCC): Primary | ICD-10-CM

## 2024-10-09 LAB
ALBUMIN SERPL BCG-MCNC: 3.6 G/DL (ref 3.5–5)
ALP SERPL-CCNC: 78 U/L (ref 34–104)
ALT SERPL W P-5'-P-CCNC: 22 U/L (ref 7–52)
ANION GAP SERPL CALCULATED.3IONS-SCNC: 7 MMOL/L (ref 4–13)
AST SERPL W P-5'-P-CCNC: 13 U/L (ref 13–39)
BASOPHILS # BLD AUTO: 0.02 THOUSANDS/ΜL (ref 0–0.1)
BASOPHILS NFR BLD AUTO: 0 % (ref 0–1)
BILIRUB SERPL-MCNC: 0.57 MG/DL (ref 0.2–1)
BUN SERPL-MCNC: 28 MG/DL (ref 5–25)
CALCIUM SERPL-MCNC: 8.1 MG/DL (ref 8.4–10.2)
CHLORIDE SERPL-SCNC: 109 MMOL/L (ref 96–108)
CO2 SERPL-SCNC: 24 MMOL/L (ref 21–32)
CREAT SERPL-MCNC: 0.93 MG/DL (ref 0.6–1.3)
EOSINOPHIL # BLD AUTO: 0.04 THOUSAND/ΜL (ref 0–0.61)
EOSINOPHIL NFR BLD AUTO: 0 % (ref 0–6)
ERYTHROCYTE [DISTWIDTH] IN BLOOD BY AUTOMATED COUNT: 17.6 % (ref 11.6–15.1)
GFR SERPL CREATININE-BSD FRML MDRD: 78 ML/MIN/1.73SQ M
GLUCOSE SERPL-MCNC: 96 MG/DL (ref 65–140)
HCT VFR BLD AUTO: 34.8 % (ref 36.5–49.3)
HGB BLD-MCNC: 11 G/DL (ref 12–17)
IMM GRANULOCYTES # BLD AUTO: 0.2 THOUSAND/UL (ref 0–0.2)
IMM GRANULOCYTES NFR BLD AUTO: 2 % (ref 0–2)
LYMPHOCYTES # BLD AUTO: 0.8 THOUSANDS/ΜL (ref 0.6–4.47)
LYMPHOCYTES NFR BLD AUTO: 8 % (ref 14–44)
MCH RBC QN AUTO: 30.6 PG (ref 26.8–34.3)
MCHC RBC AUTO-ENTMCNC: 31.6 G/DL (ref 31.4–37.4)
MCV RBC AUTO: 97 FL (ref 82–98)
MONOCYTES # BLD AUTO: 0.62 THOUSAND/ΜL (ref 0.17–1.22)
MONOCYTES NFR BLD AUTO: 6 % (ref 4–12)
NEUTROPHILS # BLD AUTO: 8.02 THOUSANDS/ΜL (ref 1.85–7.62)
NEUTS SEG NFR BLD AUTO: 84 % (ref 43–75)
NRBC BLD AUTO-RTO: 0 /100 WBCS
PLATELET # BLD AUTO: 185 THOUSANDS/UL (ref 149–390)
PMV BLD AUTO: 9.1 FL (ref 8.9–12.7)
POTASSIUM SERPL-SCNC: 3.9 MMOL/L (ref 3.5–5.3)
PROT SERPL-MCNC: 5.5 G/DL (ref 6.4–8.4)
RBC # BLD AUTO: 3.59 MILLION/UL (ref 3.88–5.62)
SODIUM SERPL-SCNC: 140 MMOL/L (ref 135–147)
WBC # BLD AUTO: 9.7 THOUSAND/UL (ref 4.31–10.16)

## 2024-10-09 PROCEDURE — 80053 COMPREHEN METABOLIC PANEL: CPT | Performed by: INTERNAL MEDICINE

## 2024-10-09 PROCEDURE — 96367 TX/PROPH/DG ADDL SEQ IV INF: CPT

## 2024-10-09 PROCEDURE — 96377 APPLICATON ON-BODY INJECTOR: CPT

## 2024-10-09 PROCEDURE — 96413 CHEMO IV INFUSION 1 HR: CPT

## 2024-10-09 PROCEDURE — 85025 COMPLETE CBC W/AUTO DIFF WBC: CPT | Performed by: INTERNAL MEDICINE

## 2024-10-09 RX ORDER — POTASSIUM CHLORIDE 1500 MG/1
20 TABLET, EXTENDED RELEASE ORAL DAILY
Qty: 30 TABLET | Refills: 5 | Status: SHIPPED | OUTPATIENT
Start: 2024-10-09 | End: 2025-04-07

## 2024-10-09 RX ORDER — SODIUM CHLORIDE 9 MG/ML
20 INJECTION, SOLUTION INTRAVENOUS ONCE
Status: COMPLETED | OUTPATIENT
Start: 2024-10-09 | End: 2024-10-09

## 2024-10-09 RX ADMIN — FAMOTIDINE 20 MG: 10 INJECTION INTRAVENOUS at 12:35

## 2024-10-09 RX ADMIN — ONDANSETRON 8 MG: 2 INJECTION, SOLUTION INTRAMUSCULAR; INTRAVENOUS at 11:50

## 2024-10-09 RX ADMIN — PEGFILGRASTIM 6 MG: KIT SUBCUTANEOUS at 14:22

## 2024-10-09 RX ADMIN — SODIUM CHLORIDE 32 MG: 0.9 INJECTION, SOLUTION INTRAVENOUS at 13:14

## 2024-10-09 RX ADMIN — SODIUM CHLORIDE 20 ML/HR: 0.9 INJECTION, SOLUTION INTRAVENOUS at 12:00

## 2024-10-09 RX ADMIN — DIPHENHYDRAMINE HYDROCHLORIDE 25 MG: 50 INJECTION, SOLUTION INTRAMUSCULAR; INTRAVENOUS at 12:14

## 2024-10-09 NOTE — PROGRESS NOTES
OSW met with pt this afternoon during his infusion. OSW introduced self to the patient, as we had not met in person before. Pt states that he is doing ok. This writer asked if he was ok, as he looked very tired and did not interact very much with this writer. Pt states that he is doing ok, he is just feeling tired. OSW asked if there is anything that I can assist him with and he stated no. OSW offered to check in on him again and he was agreeable.  OSW will continue to follow.

## 2024-10-09 NOTE — PROGRESS NOTES
Patient ambulated from chair to bed with assist of two.     Problem: Activity/Exercise Intolerance  Goal: Patient will tolerate activity/exercise  Intervention: Early Mobilization  Note: Intervention Status  Done  Intervention: Progress activity per Cardiac Rehab protocol  Note: Intervention Status  Done  Intervention: Progressive graded ambulation  Note: Intervention Status  Done  Intervention: Collaborate with nursing to ensure ambulation 4-6 times per day  Note: Intervention Status  Done  Intervention: Upper and lower extremity calisthenics per Cardiac Rehab protocol  Note: Intervention Status  Done  Intervention: Monitor and document progressive activity response  Note: Intervention Status  Done  Intervention: Encourage family to bring in assistive devices  Note: Intervention Status  Done  Intervention: Encourage hourly incentive spirometry, cough and deep breathe  Note: Intervention Status  Done      Received for Jevtana. No complaints offered. Pt did not have pretreatment labs drawn. [facility] accessed and lab work drawn per md order. Pt tolerated treatment without issue, no complaints offered. Neulasta on pro applied to left arm. Pt instructed on removal. States he has nurse from [facility] that comes and may ask her to assist in removal. Pt to return 10/30 for next treatment. AVS declined.

## 2024-10-10 ENCOUNTER — HOME CARE VISIT (OUTPATIENT)
Dept: HOME HEALTH SERVICES | Facility: HOME HEALTHCARE | Age: 79
End: 2024-10-10
Payer: COMMERCIAL

## 2024-10-10 VITALS
TEMPERATURE: 96.5 F | SYSTOLIC BLOOD PRESSURE: 110 MMHG | HEART RATE: 91 BPM | DIASTOLIC BLOOD PRESSURE: 73 MMHG | OXYGEN SATURATION: 94 % | RESPIRATION RATE: 16 BRPM

## 2024-10-10 PROCEDURE — G0299 HHS/HOSPICE OF RN EA 15 MIN: HCPCS

## 2024-10-10 NOTE — CASE COMMUNICATION
Patient reports pain in right throat. RN started to review medications but patient asked RN to stop. Patient is tired and taking pepto for diarrhea. Patient reports pain is well controlled. 0/10 when not walking. After walking pain resolves in seconds.

## 2024-10-15 ENCOUNTER — HOME CARE VISIT (OUTPATIENT)
Dept: HOME HEALTH SERVICES | Facility: HOME HEALTHCARE | Age: 79
End: 2024-10-15
Payer: COMMERCIAL

## 2024-10-15 ENCOUNTER — TELEPHONE (OUTPATIENT)
Age: 79
End: 2024-10-15

## 2024-10-15 VITALS
TEMPERATURE: 98.7 F | RESPIRATION RATE: 18 BRPM | OXYGEN SATURATION: 98 % | SYSTOLIC BLOOD PRESSURE: 136 MMHG | DIASTOLIC BLOOD PRESSURE: 90 MMHG | HEART RATE: 76 BPM

## 2024-10-15 PROCEDURE — G0300 HHS/HOSPICE OF LPN EA 15 MIN: HCPCS

## 2024-10-15 NOTE — TELEPHONE ENCOUNTER
Call from Jannette, wanting to verify which medications were on Royce's medication list. Read medications as listed in EPIC. No further concerns at this time.

## 2024-10-16 ENCOUNTER — HOME CARE VISIT (OUTPATIENT)
Dept: HOME HEALTH SERVICES | Facility: HOME HEALTHCARE | Age: 79
End: 2024-10-16
Payer: COMMERCIAL

## 2024-10-16 PROCEDURE — G0155 HHCP-SVS OF CSW,EA 15 MIN: HCPCS

## 2024-10-17 ENCOUNTER — HOME CARE VISIT (OUTPATIENT)
Dept: HOME HEALTH SERVICES | Facility: HOME HEALTHCARE | Age: 79
End: 2024-10-17
Payer: COMMERCIAL

## 2024-10-17 PROCEDURE — G0158 HHC OT ASSISTANT EA 15: HCPCS

## 2024-10-18 ENCOUNTER — HOME CARE VISIT (OUTPATIENT)
Dept: HOME HEALTH SERVICES | Facility: HOME HEALTHCARE | Age: 79
End: 2024-10-18
Payer: COMMERCIAL

## 2024-10-18 VITALS — SYSTOLIC BLOOD PRESSURE: 128 MMHG | DIASTOLIC BLOOD PRESSURE: 76 MMHG

## 2024-10-18 PROCEDURE — G0151 HHCP-SERV OF PT,EA 15 MIN: HCPCS

## 2024-10-22 ENCOUNTER — HOSPITAL ENCOUNTER (INPATIENT)
Facility: HOSPITAL | Age: 79
LOS: 6 days | DRG: 540 | End: 2024-10-28
Attending: EMERGENCY MEDICINE | Admitting: INTERNAL MEDICINE
Payer: OTHER GOVERNMENT

## 2024-10-22 ENCOUNTER — NURSE TRIAGE (OUTPATIENT)
Age: 79
End: 2024-10-22

## 2024-10-22 ENCOUNTER — TELEPHONE (OUTPATIENT)
Age: 79
End: 2024-10-22

## 2024-10-22 ENCOUNTER — HOME CARE VISIT (OUTPATIENT)
Dept: HOME HEALTH SERVICES | Facility: HOME HEALTHCARE | Age: 79
End: 2024-10-22
Payer: COMMERCIAL

## 2024-10-22 ENCOUNTER — APPOINTMENT (EMERGENCY)
Dept: RADIOLOGY | Facility: HOSPITAL | Age: 79
DRG: 540 | End: 2024-10-22
Payer: OTHER GOVERNMENT

## 2024-10-22 VITALS — SYSTOLIC BLOOD PRESSURE: 118 MMHG | DIASTOLIC BLOOD PRESSURE: 68 MMHG

## 2024-10-22 VITALS
OXYGEN SATURATION: 94 % | DIASTOLIC BLOOD PRESSURE: 64 MMHG | TEMPERATURE: 98.9 F | HEART RATE: 83 BPM | RESPIRATION RATE: 18 BRPM | SYSTOLIC BLOOD PRESSURE: 112 MMHG

## 2024-10-22 DIAGNOSIS — Z13.9 ENCOUNTER FOR SCREENING INVOLVING SOCIAL DETERMINANTS OF HEALTH (SDOH): ICD-10-CM

## 2024-10-22 DIAGNOSIS — T14.8XXA WOUND INFECTION: Primary | ICD-10-CM

## 2024-10-22 DIAGNOSIS — L08.9 WOUND INFECTION: Primary | ICD-10-CM

## 2024-10-22 DIAGNOSIS — S91.309A WOUND OF FOOT: ICD-10-CM

## 2024-10-22 DIAGNOSIS — I73.9 PAD (PERIPHERAL ARTERY DISEASE) (HCC): ICD-10-CM

## 2024-10-22 DIAGNOSIS — S91.309A OPEN WOUND OF FOOT: ICD-10-CM

## 2024-10-22 DIAGNOSIS — M86.9 OSTEOMYELITIS OF RIGHT FOOT, UNSPECIFIED TYPE (HCC): ICD-10-CM

## 2024-10-22 LAB
ALBUMIN SERPL BCG-MCNC: 3.9 G/DL (ref 3.5–5)
ALP SERPL-CCNC: 69 U/L (ref 34–104)
ALT SERPL W P-5'-P-CCNC: 20 U/L (ref 7–52)
ANION GAP SERPL CALCULATED.3IONS-SCNC: 8 MMOL/L (ref 4–13)
ANISOCYTOSIS BLD QL SMEAR: PRESENT
AST SERPL W P-5'-P-CCNC: 12 U/L (ref 13–39)
BASOPHILS # BLD MANUAL: 0 THOUSAND/UL (ref 0–0.1)
BASOPHILS NFR MAR MANUAL: 0 % (ref 0–1)
BILIRUB SERPL-MCNC: 0.54 MG/DL (ref 0.2–1)
BUN SERPL-MCNC: 22 MG/DL (ref 5–25)
CALCIUM SERPL-MCNC: 8 MG/DL (ref 8.4–10.2)
CHLORIDE SERPL-SCNC: 110 MMOL/L (ref 96–108)
CO2 SERPL-SCNC: 23 MMOL/L (ref 21–32)
CREAT SERPL-MCNC: 0.86 MG/DL (ref 0.6–1.3)
EOSINOPHIL # BLD MANUAL: 0.1 THOUSAND/UL (ref 0–0.4)
EOSINOPHIL NFR BLD MANUAL: 1 % (ref 0–6)
ERYTHROCYTE [DISTWIDTH] IN BLOOD BY AUTOMATED COUNT: 16.4 % (ref 11.6–15.1)
GFR SERPL CREATININE-BSD FRML MDRD: 83 ML/MIN/1.73SQ M
GLUCOSE SERPL-MCNC: 81 MG/DL (ref 65–140)
GLUCOSE SERPL-MCNC: 85 MG/DL (ref 65–140)
GLUCOSE SERPL-MCNC: 99 MG/DL (ref 65–140)
HCT VFR BLD AUTO: 37.8 % (ref 36.5–49.3)
HGB BLD-MCNC: 12.1 G/DL (ref 12–17)
LG PLATELETS BLD QL SMEAR: PRESENT
LYMPHOCYTES # BLD AUTO: 0.95 THOUSAND/UL (ref 0.6–4.47)
LYMPHOCYTES # BLD AUTO: 9 % (ref 14–44)
MCH RBC QN AUTO: 30.9 PG (ref 26.8–34.3)
MCHC RBC AUTO-ENTMCNC: 32 G/DL (ref 31.4–37.4)
MCV RBC AUTO: 96 FL (ref 82–98)
MONOCYTES # BLD AUTO: 0.57 THOUSAND/UL (ref 0–1.22)
MONOCYTES NFR BLD: 6 % (ref 4–12)
MYELOCYTE ABSOLUTE CT: 0.19 THOUSAND/UL (ref 0–0.1)
MYELOCYTES NFR BLD MANUAL: 2 % (ref 0–1)
NEUTROPHILS # BLD MANUAL: 7.71 THOUSAND/UL (ref 1.85–7.62)
NEUTS BAND NFR BLD MANUAL: 3 % (ref 0–8)
NEUTS SEG NFR BLD AUTO: 78 % (ref 43–75)
PLATELET # BLD AUTO: 169 THOUSANDS/UL (ref 149–390)
PLATELET BLD QL SMEAR: ADEQUATE
PMV BLD AUTO: 9.4 FL (ref 8.9–12.7)
POIKILOCYTOSIS BLD QL SMEAR: PRESENT
POTASSIUM SERPL-SCNC: 4 MMOL/L (ref 3.5–5.3)
PROCALCITONIN SERPL-MCNC: 0.07 NG/ML
PROT SERPL-MCNC: 6.2 G/DL (ref 6.4–8.4)
RBC # BLD AUTO: 3.92 MILLION/UL (ref 3.88–5.62)
RBC MORPH BLD: PRESENT
SODIUM SERPL-SCNC: 141 MMOL/L (ref 135–147)
VARIANT LYMPHS # BLD AUTO: 1 %
WBC # BLD AUTO: 9.52 THOUSAND/UL (ref 4.31–10.16)

## 2024-10-22 PROCEDURE — 36415 COLL VENOUS BLD VENIPUNCTURE: CPT

## 2024-10-22 PROCEDURE — 85027 COMPLETE CBC AUTOMATED: CPT

## 2024-10-22 PROCEDURE — 96365 THER/PROPH/DIAG IV INF INIT: CPT

## 2024-10-22 PROCEDURE — 80053 COMPREHEN METABOLIC PANEL: CPT

## 2024-10-22 PROCEDURE — 73660 X-RAY EXAM OF TOE(S): CPT

## 2024-10-22 PROCEDURE — 99285 EMERGENCY DEPT VISIT HI MDM: CPT

## 2024-10-22 PROCEDURE — G0151 HHCP-SERV OF PT,EA 15 MIN: HCPCS

## 2024-10-22 PROCEDURE — 82948 REAGENT STRIP/BLOOD GLUCOSE: CPT

## 2024-10-22 PROCEDURE — 85007 BL SMEAR W/DIFF WBC COUNT: CPT

## 2024-10-22 PROCEDURE — 84145 PROCALCITONIN (PCT): CPT

## 2024-10-22 PROCEDURE — 99285 EMERGENCY DEPT VISIT HI MDM: CPT | Performed by: EMERGENCY MEDICINE

## 2024-10-22 PROCEDURE — 99222 1ST HOSP IP/OBS MODERATE 55: CPT | Performed by: INTERNAL MEDICINE

## 2024-10-22 PROCEDURE — 90662 IIV NO PRSV INCREASED AG IM: CPT | Performed by: INTERNAL MEDICINE

## 2024-10-22 PROCEDURE — G0008 ADMIN INFLUENZA VIRUS VAC: HCPCS | Performed by: INTERNAL MEDICINE

## 2024-10-22 PROCEDURE — G0299 HHS/HOSPICE OF RN EA 15 MIN: HCPCS

## 2024-10-22 RX ORDER — OXYCODONE HYDROCHLORIDE 5 MG/1
5 TABLET ORAL EVERY 4 HOURS PRN
Status: DISCONTINUED | OUTPATIENT
Start: 2024-10-22 | End: 2024-10-28 | Stop reason: HOSPADM

## 2024-10-22 RX ORDER — CLOPIDOGREL BISULFATE 75 MG/1
75 TABLET ORAL DAILY
Status: DISCONTINUED | OUTPATIENT
Start: 2024-10-23 | End: 2024-10-28 | Stop reason: HOSPADM

## 2024-10-22 RX ORDER — CEFEPIME HYDROCHLORIDE 2 G/50ML
2000 INJECTION, SOLUTION INTRAVENOUS ONCE
Status: COMPLETED | OUTPATIENT
Start: 2024-10-23 | End: 2024-10-23

## 2024-10-22 RX ORDER — ENOXAPARIN SODIUM 100 MG/ML
40 INJECTION SUBCUTANEOUS DAILY
Status: DISCONTINUED | OUTPATIENT
Start: 2024-10-23 | End: 2024-10-22

## 2024-10-22 RX ORDER — POTASSIUM CHLORIDE 1500 MG/1
20 TABLET, EXTENDED RELEASE ORAL DAILY
Status: DISCONTINUED | OUTPATIENT
Start: 2024-10-23 | End: 2024-10-28 | Stop reason: HOSPADM

## 2024-10-22 RX ORDER — METOPROLOL SUCCINATE 25 MG/1
25 TABLET, EXTENDED RELEASE ORAL DAILY
Status: DISCONTINUED | OUTPATIENT
Start: 2024-10-23 | End: 2024-10-28 | Stop reason: HOSPADM

## 2024-10-22 RX ORDER — GABAPENTIN 300 MG/1
600 CAPSULE ORAL 2 TIMES DAILY
Status: DISCONTINUED | OUTPATIENT
Start: 2024-10-22 | End: 2024-10-28 | Stop reason: HOSPADM

## 2024-10-22 RX ORDER — INSULIN LISPRO 100 [IU]/ML
1-5 INJECTION, SOLUTION INTRAVENOUS; SUBCUTANEOUS
Status: DISCONTINUED | OUTPATIENT
Start: 2024-10-22 | End: 2024-10-28 | Stop reason: HOSPADM

## 2024-10-22 RX ORDER — ATORVASTATIN CALCIUM 40 MG/1
80 TABLET, FILM COATED ORAL
Status: DISCONTINUED | OUTPATIENT
Start: 2024-10-22 | End: 2024-10-28 | Stop reason: HOSPADM

## 2024-10-22 RX ORDER — CEFEPIME HYDROCHLORIDE 2 G/50ML
2000 INJECTION, SOLUTION INTRAVENOUS ONCE
Status: COMPLETED | OUTPATIENT
Start: 2024-10-22 | End: 2024-10-22

## 2024-10-22 RX ORDER — EZETIMIBE 10 MG/1
10 TABLET ORAL DAILY
Status: DISCONTINUED | OUTPATIENT
Start: 2024-10-23 | End: 2024-10-28 | Stop reason: HOSPADM

## 2024-10-22 RX ORDER — PREDNISONE 10 MG/1
5 TABLET ORAL 2 TIMES DAILY WITH MEALS
Status: DISCONTINUED | OUTPATIENT
Start: 2024-10-22 | End: 2024-10-28 | Stop reason: HOSPADM

## 2024-10-22 RX ORDER — FUROSEMIDE 40 MG/1
40 TABLET ORAL DAILY
Status: DISCONTINUED | OUTPATIENT
Start: 2024-10-23 | End: 2024-10-22

## 2024-10-22 RX ORDER — FUROSEMIDE 20 MG/1
20 TABLET ORAL DAILY
Status: DISCONTINUED | OUTPATIENT
Start: 2024-10-23 | End: 2024-10-28 | Stop reason: HOSPADM

## 2024-10-22 RX ORDER — LOSARTAN POTASSIUM 25 MG/1
12.5 TABLET ORAL DAILY
Status: DISCONTINUED | OUTPATIENT
Start: 2024-10-23 | End: 2024-10-28 | Stop reason: HOSPADM

## 2024-10-22 RX ORDER — ASPIRIN 81 MG/1
81 TABLET, CHEWABLE ORAL DAILY
Status: DISCONTINUED | OUTPATIENT
Start: 2024-10-23 | End: 2024-10-28 | Stop reason: HOSPADM

## 2024-10-22 RX ORDER — ACETAMINOPHEN 325 MG/1
650 TABLET ORAL EVERY 6 HOURS PRN
Status: DISCONTINUED | OUTPATIENT
Start: 2024-10-22 | End: 2024-10-28 | Stop reason: HOSPADM

## 2024-10-22 RX ORDER — ENOXAPARIN SODIUM 100 MG/ML
1 INJECTION SUBCUTANEOUS EVERY 12 HOURS SCHEDULED
Status: DISCONTINUED | OUTPATIENT
Start: 2024-10-22 | End: 2024-10-23

## 2024-10-22 RX ADMIN — PREDNISONE 5 MG: 10 TABLET ORAL at 18:08

## 2024-10-22 RX ADMIN — GABAPENTIN 600 MG: 300 CAPSULE ORAL at 18:08

## 2024-10-22 RX ADMIN — ACETAMINOPHEN 650 MG: 325 TABLET, FILM COATED ORAL at 23:04

## 2024-10-22 RX ADMIN — VANCOMYCIN HYDROCHLORIDE 2000 MG: 1 INJECTION, POWDER, LYOPHILIZED, FOR SOLUTION INTRAVENOUS at 17:58

## 2024-10-22 RX ADMIN — ENOXAPARIN SODIUM 100 MG: 100 INJECTION SUBCUTANEOUS at 20:22

## 2024-10-22 RX ADMIN — INFLUENZA A VIRUS A/VICTORIA/4897/2022 IVR-238 (H1N1) ANTIGEN (FORMALDEHYDE INACTIVATED), INFLUENZA A VIRUS A/CALIFORNIA/122/2022 SAN-022 (H3N2) ANTIGEN (FORMALDEHYDE INACTIVATED), AND INFLUENZA B VIRUS B/MICHIGAN/01/2021 ANTIGEN (FORMALDEHYDE INACTIVATED) 0.5 ML: 60; 60; 60 INJECTION, SUSPENSION INTRAMUSCULAR at 18:16

## 2024-10-22 RX ADMIN — ATORVASTATIN CALCIUM 80 MG: 40 TABLET, FILM COATED ORAL at 18:09

## 2024-10-22 RX ADMIN — CEFEPIME HYDROCHLORIDE 2000 MG: 2 INJECTION, SOLUTION INTRAVENOUS at 16:06

## 2024-10-22 NOTE — ASSESSMENT & PLAN NOTE
Patient with history of vascular dementia, strong concern for compliance with medications and inability to care for himself, will have PT and OT cognitive eval

## 2024-10-22 NOTE — PLAN OF CARE
Problem: PAIN - ADULT  Goal: Verbalizes/displays adequate comfort level or baseline comfort level  Description: Interventions:  - Encourage patient to monitor pain and request assistance  - Assess pain using appropriate pain scale  - Administer analgesics based on type and severity of pain and evaluate response  - Implement non-pharmacological measures as appropriate and evaluate response  - Consider cultural and social influences on pain and pain management  - Notify physician/advanced practitioner if interventions unsuccessful or patient reports new pain  Outcome: Progressing     Problem: INFECTION - ADULT  Goal: Absence or prevention of progression during hospitalization  Description: INTERVENTIONS:  - Assess and monitor for signs and symptoms of infection  - Monitor lab/diagnostic results  - Monitor all insertion sites, i.e. indwelling lines, tubes, and drains  - Monitor endotracheal if appropriate and nasal secretions for changes in amount and color  - Wells appropriate cooling/warming therapies per order  - Administer medications as ordered  - Instruct and encourage patient and family to use good hand hygiene technique  - Identify and instruct in appropriate isolation precautions for identified infection/condition  Outcome: Progressing  Goal: Absence of fever/infection during neutropenic period  Description: INTERVENTIONS:  - Monitor WBC    Outcome: Progressing     Problem: SAFETY ADULT  Goal: Patient will remain free of falls  Description: INTERVENTIONS:  - Educate patient/family on patient safety including physical limitations  - Instruct patient to call for assistance with activity   - Consult OT/PT to assist with strengthening/mobility   - Keep Call bell within reach  - Keep bed low and locked with side rails adjusted as appropriate  - Keep care items and personal belongings within reach  - Initiate and maintain comfort rounds  - Make Fall Risk Sign visible to staff  - Offer Toileting every 2 Hours,  in advance of need  - Initiate/Maintain bed alarm  - Obtain necessary fall risk management equipment: fall cushion  - Apply yellow socks and bracelet for high fall risk patients  - Consider moving patient to room near nurses station  Outcome: Progressing  Goal: Maintain or return to baseline ADL function  Description: INTERVENTIONS:  -  Assess patient's ability to carry out ADLs; assess patient's baseline for ADL function and identify physical deficits which impact ability to perform ADLs (bathing, care of mouth/teeth, toileting, grooming, dressing, etc.)  - Assess/evaluate cause of self-care deficits   - Assess range of motion  - Assess patient's mobility; develop plan if impaired  - Assess patient's need for assistive devices and provide as appropriate  - Encourage maximum independence but intervene and supervise when necessary  - Involve family in performance of ADLs  - Assess for home care needs following discharge   - Consider OT consult to assist with ADL evaluation and planning for discharge  - Provide patient education as appropriate  Outcome: Progressing  Outcome: Progressing     Problem: DISCHARGE PLANNING  Goal: Discharge to home or other facility with appropriate resources  Description: INTERVENTIONS:  - Identify barriers to discharge w/patient and caregiver  - Arrange for needed discharge resources and transportation as appropriate  - Identify discharge learning needs (meds, wound care, etc.)  - Arrange for interpretive services to assist at discharge as needed  - Refer to Case Management Department for coordinating discharge planning if the patient needs post-hospital services based on physician/advanced practitioner order or complex needs related to functional status, cognitive ability, or social support system  Outcome: Progressing     Problem: Knowledge Deficit  Goal: Patient/family/caregiver demonstrates understanding of disease process, treatment plan, medications, and discharge  instructions  Description: Complete learning assessment and assess knowledge base.  Interventions:  - Provide teaching at level of understanding  - Provide teaching via preferred learning methods  Outcome: Progressing

## 2024-10-22 NOTE — ASSESSMENT & PLAN NOTE
Patient with history of peripheral dural disease status post stenting  Patient with noncompliance with his DAPT therapy  Will consult vascular surgery for further evaluation , patient with a faint pedal pulses bilaterally on Doppler  Will restart patient's aspirin and Plavix, started on therapeutic Lovenox

## 2024-10-22 NOTE — ASSESSMENT & PLAN NOTE
Patient with chronic lower extremity wounds, reports worsening of his right toe since hitting it against his refrigerator  Denies any systemic symptoms fevers chills changes in sensation motor function  Will continue on cefepime and vancomycin as he does have some surrounding cellulitis versus ischemic changes, podiatry and vascular consult ordered  Will need vascular workup with leads

## 2024-10-22 NOTE — TELEPHONE ENCOUNTER
"Pt's [facility] nurse, Roxie calling.  She states that last week Royce bumped his right great toe on the refrigerator door.  Over the last week, it has developed into an wound.  The wound is red with pus. See the picture under the Media tab.  The wound is painful, especially to touch or when weight bearing.  Pt is partial weight bearing.  Pt is afebrile.  Pt is homebound right now.  RN called the office.  Dr. Chandra reviewed media pictures.  Per Dr. Chandra, she would have to see him in the office to treat him with abx.  Her suggestion is to come into the office for an appt or to call palliative to see if they'll treat him.  [facility] RN can also place wound orders for her to approve.  After discussing the options with the patient and Roxie, the pt wants to go to the ER to be evaluated.  He wants to go today and will need EMS to take him.  Roxie will assist with coordinating this.     Reason for Disposition   Looks infected (e.g., spreading redness, red streak, pus)    Answer Assessment - Initial Assessment Questions  1. MECHANISM: \"How did the injury happen?\"       Pt bumped his toe on the refrigerator door last week   2. ONSET: \"When did the injury happen?\" (Minutes or hours ago)       Last week   3. LOCATION: \"What part of the toe is injured?\" \"Is the nail damaged?\"       Right great toe with nail damage   4. APPEARANCE of TOE INJURY: \"What does the injury look like?\"       Nail damage, infected   5. SEVERITY: \"Can you use the foot normally?\" \"Can you walk?\"       Partial weight bearing   6. SIZE: For cuts, bruises, or swelling, ask: \"How large is it?\" (e.g., inches or centimeters;  entire toe)       Red, swollen, pus  7. PAIN: \"Is there pain?\" If Yes, ask: \"How bad is the pain?\"   (e.g., Scale 1-10; or mild, moderate, severe)      Moderate pain with weight bearing   8. TETANUS: For any breaks in the skin, ask: \"When was the last tetanus booster?\"      N/a   9. DIABETES: \"Do you have a history of diabetes or poor " "circulation in the feet?\"      no  10. OTHER SYMPTOMS: \"Do you have any other symptoms?\"         Afebrile  11. PREGNANCY: \"Is there any chance you are pregnant?\" \"When was your last menstrual period?\"        no    Protocols used: Toe Injury-ADULT-[place]    "

## 2024-10-22 NOTE — H&P
H&P - Hospitalist   Name: Royce Rene 78 y.o. male I MRN: [id number]  Unit/Bed#: -01 I Date of Admission: 10/22/2024   Date of Service: 10/22/2024 I Hospital Day: 0     Assessment & Plan  Wound of foot  Patient with chronic lower extremity wounds, reports worsening of his right toe since hitting it against his refrigerator  Denies any systemic symptoms fevers chills changes in sensation motor function  Will continue on cefepime and vancomycin as he does have some surrounding cellulitis versus ischemic changes, podiatry and vascular consult ordered  Will need vascular workup with leads     Chronic combined systolic and diastolic CHF (congestive heart failure) ([facility])  Wt Readings from Last 3 Encounters:   10/22/24 97.2 kg (214 lb 3.2 oz)   10/09/24 100 kg (221 lb 5.5 oz)   09/25/24 99.8 kg (220 lb)       Will continue home medications -Daily weights and ins and outs      CAD (coronary artery disease)  Patient with history of CAD, will continue beta-blocker statin and aspirin and Plavix, although I have strong suspicion he has not been compliant with these  PAD (peripheral artery disease) ([facility])  Patient with history of peripheral dural disease status post stenting  Patient with noncompliance with his DAPT therapy  Will consult vascular surgery for further evaluation , patient with a faint pedal pulses bilaterally on Doppler  Will restart patient's aspirin and Plavix, started on therapeutic Lovenox  Moderate vascular dementia ([facility])  Patient with history of vascular dementia, strong concern for compliance with medications and inability to care for himself, will have PT and OT cognitive eval      VTE Pharmacologic Prophylaxis:   High Risk (Score >/= 5) - Pharmacological DVT Prophylaxis Ordered: enoxaparin (Lovenox). Sequential Compression Devices Ordered.  Code Status: Level 1 - Full Code   Discussion with family: Patient declined call to .     Anticipated Length of Stay: Patient will be admitted on an  inpatient basis with an anticipated length of stay of greater than 2 midnights secondary to wounds.    History of Present Illness   Chief Complaint: lower extremity wounds    Royce Rene is a 78 y.o. male with a PMH of prostate cancer undergoing treatment with hematology oncology, CAD status post stenting, heart failure with reduced ejection fraction, peripheral arterial disease, vascular dementia who presents with lower extremity wounds.  Patient with a history of chronic extremity, reports that he hit his big right toe on the refrigerator door about a week ago and now has a worsening wound, he denies discharge but does note surrounding redness.  At baseline he has pain with ambulation in his legs, states he feels this is worsening recently.  He denies any chest pain shortness of breath lightheadedness dizziness syncopal episodes.  Patient is an overall poor historian and cannot tell me all his medications and reports he stopped taking aspirin a long time ago, unsure about Plavix.     Review of Systems   Constitutional:  Positive for fatigue. Negative for chills and fever.   HENT:  Negative for ear pain and sore throat.    Eyes:  Negative for pain and visual disturbance.   Respiratory:  Negative for cough and shortness of breath.    Cardiovascular:  Negative for chest pain, palpitations and leg swelling.   Gastrointestinal:  Negative for abdominal distention, abdominal pain and vomiting.   Genitourinary:  Negative for dysuria and hematuria.   Musculoskeletal:  Negative for arthralgias and back pain.   Skin:  Positive for color change, rash and wound.   Neurological:  Negative for seizures and syncope.   All other systems reviewed and are negative.      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    Coronary artery disease 2001    S/p stenting to circumflex and RCA    HFrEF (heart failure with reduced ejection fraction) ([facility]) 06/14/2021    High cholesterol      Prostate cancer ([facility])      Past Surgical History:   Procedure Laterality Date    CARDIAC ELECTROPHYSIOLOGY PROCEDURE N/A 2021    Procedure: Implant ICD - bi ventricular;  Surgeon: Jonas Oviedo MD;  Location: [facility] CARD[facility];  Service: Cardiology    CARDIAC SURGERY      IR LOWER EXTREMITY ANGIOGRAM  2023    [place] TEAEC W/WO PATCH GRAFT COMMON FEMORAL Right 2021    Procedure: ENDARTERECTOMY ARTERIAL FEMORAL;  Surgeon: Serina Cook DO;  Location: [facility];  Service: Vascular     Social History     Tobacco Use    Smoking status: Former     Current packs/day: 0.00     Types: Cigarettes     Start date: 1962     Quit date: 2002     Years since quittin.3     Passive exposure: Past    Smokeless tobacco: Never   Vaping Use    Vaping status: Never Used   Substance and Sexual Activity    Alcohol use: Not Currently    Drug use: Not Currently    Sexual activity: Not on file     E-Cigarette/Vaping    E-Cigarette Use Never User      E-Cigarette/Vaping Substances    Nicotine No     THC No     CBD No     Flavoring No     Other No     Unknown No      Family History   Problem Relation Age of Onset    Cancer Mother      Social History:  Marital Status:    Occupation:   Patient Pre-hospital Living Situation: Home  Patient Pre-hospital Level of Mobility: walks  Patient Pre-hospital Diet Restrictions:     Meds/Allergies   I have reviewed home medications with patient personally.  Prior to Admission medications    Medication Sig Start Date End Date Taking? Authorizing Provider   Alcohol Swabs (Alcohol Pads) 70 % PADS USE 2-3 TIMES AS DIRECTED. 24  Yes Historical Provider, MD   atorvastatin (LIPITOR) 80 mg tablet Take 1 tablet (80 mg total) by mouth daily with dinner 3/14/23  Yes Silverio Bhandari MD   Blood Glucose Monitoring Suppl (Accu-Chek Guide Me) w/Device KIT USE AS DIRECTED 24  Yes Amanda Keenan MD   cetirizine (ZyrTEC) 5 MG tablet Take 1 tablet (5 mg total) by mouth  daily 3/31/23  Yes Gavin Andrew MD   Empagliflozin (Jardiance) 10 MG TABS tablet TAKE 1 TABLET (10 MG TOTAL) BY MOUTH EVERY MORNING 8/23/24  Yes Kelsy Trejo MD   ezetimibe (ZETIA) 10 mg tablet Take 1 tablet (10 mg total) by mouth daily 9/16/24 3/15/25 Yes Kelsy Trejo MD   furosemide (LASIX) 20 mg tablet TAKE 2 TABLETS (40 MG TOTAL) BY MOUTH DAILY 6/7/24  Yes Kelsy Trejo MD   gabapentin (NEURONTIN) 300 mg capsule Take 2 capsules (600 mg total) by mouth 2 (two) times a day 4/15/24  Yes Gavin Andrew MD   glucose blood (Accu-Chek Guide) test strip TEST 2-3 TIMES AS DIRECTED 5/16/24  Yes Kelsy Trejo MD   Lancet Devices (Lancing Device) MISC TID 9/12/23  Yes Amanda Keenan MD   loperamide (IMODIUM) 2 mg capsule Take 1 capsule (2 mg total) by mouth 4 (four) times a day as needed for diarrhea 3/14/23  Yes Silverio Bhandari MD   methocarbamol (ROBAXIN) 500 mg tablet TAKE 1 TABLET (500 MG TOTAL) BY MOUTH 3 (THREE) TIMES A DAY 8/14/24  Yes Gavin Andrew MD   metoprolol succinate (TOPROL-XL) 25 mg 24 hr tablet Take 1 tablet (25 mg total) by mouth daily 5/30/24  Yes Kelsy Trejo MD   ondansetron (ZOFRAN) 4 mg tablet Take 1 tablet (4 mg total) by mouth every 8 (eight) hours as needed for nausea or vomiting 7/26/24  Yes Jung Hernandez MD   polyethylene glycol (GLYCOLAX) 17 GM/SCOOP powder Take 17 g by mouth every other day Monday Wednesday Friday  Patient taking differently: Take 17 g by mouth every other day Monday Wednesday Friday   dissolve in 8 oz liquid of choice 5/13/24  Yes Gavin Andrew MD   potassium chloride (Klor-Con M20) 20 mEq tablet TAKE 1 TABLET (20 MEQ TOTAL) BY MOUTH DAILY 10/9/24 4/7/25 Yes Anne Chandra MD   predniSONE 5 mg tablet Take 1 tablet (5 mg total) by mouth 2 (two) times a day with meals 4/26/24  Yes MD jamil Boles (SENOKOT) 8.6 MG tablet Take 1 tablet (8.6 mg total) by mouth 2 (two) times a day To prevent constipation.  Hold one dose  for loose stool. 2/8/24  Yes Gaivn Andrew MD   Accu-Chek FastClix Lancets MISC TEST 2-3 TIMES AS DIRECTED 1/5/24   Historical Provider, MD   acetaminophen (TYLENOL) 500 mg tablet Take 2 tablets (1,000 mg total) by mouth 3 (three) times a day as needed for mild pain  Patient not taking: Reported on 10/22/2024 3/23/23   Gavin Andrew MD   aspirin 81 mg chewable tablet Chew 1 tablet (81 mg total) daily 3/14/23 11/20/23  Silverio Bhandari MD   clopidogrel (PLAVIX) 75 mg tablet Take 1 tablet (75 mg total) by mouth daily 3/14/23 11/20/23  Silverio Bhandari MD   famotidine (PEPCID) 20 mg tablet Take 1 tablet (20 mg total) by mouth daily 3/14/23 11/1/23  Silverio Bhandari MD   ferrous sulfate 325 (65 Fe) mg tablet Take 1 tablet (325 mg total) by mouth 2 (two) times a day with meals 3/14/23 11/1/23  Silverio Bhandari MD   losartan (COZAAR) 25 mg tablet TAKE 0.5 TABLETS (12.5 MG TOTAL) BY MOUTH DAILY 6/18/24   Kelsy Trejo MD     No Known Allergies    Objective :  Temp:  [98.1 °F (36.7 °C)-98.9 °F (37.2 °C)] 98.1 °F (36.7 °C)  HR:  [80-87] 80  BP: (112-142)/(64-73) 142/73  Resp:  [16-18] 18  SpO2:  [94 %-96 %] 96 %  O2 Device: None (Room air)    Physical Exam  Vitals and nursing note reviewed.   Constitutional:       General: He is not in acute distress.     Appearance: He is well-developed. He is not toxic-appearing or diaphoretic.   HENT:      Head: Normocephalic and atraumatic.   Eyes:      General: No scleral icterus.     Conjunctiva/sclera: Conjunctivae normal.   Cardiovascular:      Rate and Rhythm: Normal rate and regular rhythm.      Heart sounds: No murmur heard.     No friction rub. No gallop.   Pulmonary:      Effort: Pulmonary effort is normal. No respiratory distress.      Breath sounds: Normal breath sounds. No stridor. No wheezing, rhonchi or rales.   Chest:      Chest wall: No tenderness.   Abdominal:      General: There is no distension.      Palpations: Abdomen is soft.  "There is no mass.      Tenderness: There is no abdominal tenderness. There is no guarding or rebound.      Hernia: No hernia is present.   Musculoskeletal:         General: No swelling.      Cervical back: Neck supple.   Skin:     General: Skin is warm and dry.      Capillary Refill: Capillary refill takes less than 2 seconds.      Findings: Erythema present.   Neurological:      Mental Status: He is alert and oriented to person, place, and time.      Comments: Lower extremity wound photos under media, faint pulses on Doppler bilaterally   Psychiatric:         Mood and Affect: Mood normal.          Lines/Drains:      Central Line:  Goal for removal: N/A - Chronic PICC             Lab Results: I have reviewed the following results:  Results from last 7 days   Lab Units 10/22/24  1536   WBC Thousand/uL 9.52   HEMOGLOBIN g/dL 12.1   HEMATOCRIT % 37.8   PLATELETS Thousands/uL 169   BANDS PCT % 3   LYMPHO PCT % 9*   MONO PCT % 6   EOS PCT % 1     Results from last 7 days   Lab Units 10/22/24  1536   SODIUM mmol/L 141   POTASSIUM mmol/L 4.0   CHLORIDE mmol/L 110*   CO2 mmol/L 23   BUN mg/dL 22   CREATININE mg/dL 0.86   ANION GAP mmol/L 8   CALCIUM mg/dL 8.0*   ALBUMIN g/dL 3.9   TOTAL BILIRUBIN mg/dL 0.54   ALK PHOS U/L 69   ALT U/L 20   AST U/L 12*   GLUCOSE RANDOM mg/dL 81         Results from last 7 days   Lab Units 10/22/24  1746   POC GLUCOSE mg/dl 85     No results found for: \"HGBA1C\"  Results from last 7 days   Lab Units 10/22/24  1536   PROCALCITONIN ng/ml 0.07       Imaging Results Review: No pertinent imaging studies reviewed.  Other Study Results Review: No additional pertinent studies reviewed.    Administrative Statements       ** Please Note: This note has been constructed using a voice recognition system. **    "

## 2024-10-22 NOTE — ED PROVIDER NOTES
Time reflects when diagnosis was documented in both MDM as applicable and the Disposition within this note       Time User Action Codes Description Comment    10/22/2024  4:39 PM Stephan, Ann Marie Add [T14.8XXA,  L08.9] Wound infection     10/22/2024  4:47 PM Vini Oliver Add [I73.9] PAD (peripheral artery disease) (Formerly [facility])     10/22/2024  4:51 PM Saurav Oliverlan Add [S91.309A] Wound of foot     10/22/2024  5:31 PM Stephan, Ann Marie Add [S91.309A] Open wound of foot     10/22/2024  5:32 PM Stephan, Ann Marie Modify [S91.309A] Open wound of foot R first toe    10/22/2024  5:32 PM StephanVilma bellamye Modify [I73.9] PAD (peripheral artery disease) (Formerly [facility])           ED Disposition       ED Disposition   Admit    Condition   Stable    Date/Time   Tue Oct 22, 2024  4:39 PM    Comment   Case was discussed with DALE and the patient's admission status was agreed to be Admission Status: inpatient status to the service of Dr. Oliver .               Assessment & Plan       Medical Decision Making  Amount and/or Complexity of Data Reviewed  Labs: ordered. Decision-making details documented in ED Course.  Radiology: ordered.    Risk  Prescription drug management.  Decision regarding hospitalization.    See ED course for MDM.      ED Course as of 10/22/24 1735   Tue Oct 22, 2024   1545 DDx including but not limited to: wound infection, cellulitis, abscess, osteomyelitis, bleeding       1557 WBC: 9.52   1622 Procalcitonin: 0.07   1640 Given patient's comorbidities, pt would benefit with IV abx and podiatry evaluation.    1640 Vanc and Cefepime ordered.   1640 Discussed results with patient and need for admission. Patient voices understanding and agrees with plan. All questions were addressed. No other concerns at this time.    Discussed patient's case with Dr. Oliver (DALE) regarding admission who accepted the patient for further evaluation and management.         Medications   vancomycin (VANCOCIN) 2,000 mg in sodium chloride 0.9 % 500 mL IVPB (has no  "administration in time range)   cefepime (MAXIPIME) IVPB (premix in dextrose) 2,000 mg 50 mL (0 mg Intravenous Stopped 10/22/24 1649)       ED Risk Strat Scores                           SBIRT 22yo+      Flowsheet Row Most Recent Value   Initial Alcohol Screen: US AUDIT-C     1. How often do you have a drink containing alcohol? 0 Filed at: 10/22/2024 1434   2. How many drinks containing alcohol do you have on a typical day you are drinking?  0 Filed at: 10/22/2024 1434   3a. Male UNDER 65: How often do you have five or more drinks on one occasion? 0 Filed at: 10/22/2024 1434   3b. FEMALE Any Age, or MALE 65+: How often do you have 4 or more drinks on one occassion? 0 Filed at: 10/22/2024 1434   Audit-C Score 0 Filed at: 10/22/2024 1434   IVONNE: How many times in the past year have you...    Used an illegal drug or used a prescription medication for non-medical reasons? 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    Coronary artery disease 2001    S/p stenting to circumflex and RCA    HFrEF (heart failure with reduced ejection fraction) (formerly [facility]) 06/14/2021    High cholesterol     Prostate cancer (formerly [facility])       Past Surgical History:   Procedure Laterality Date    CARDIAC ELECTROPHYSIOLOGY PROCEDURE N/A 12/23/2021    Procedure: Implant ICD - bi ventricular;  Surgeon: Jonas Oviedo MD;  Location: [facility];  Service: Cardiology    CARDIAC SURGERY      IR LOWER EXTREMITY ANGIOGRAM  4/18/2023    MD TEAEC W/WO PATCH GRAFT COMMON FEMORAL Right 7/9/2021    Procedure: ENDARTERECTOMY ARTERIAL FEMORAL;  Surgeon: Serina Cook DO;  Location: [facility];  Service: Vascular      Family History   Problem Relation Age of Onset    " Cancer Mother       Social History     Tobacco Use    Smoking status: Former     Current packs/day: 0.00     Types: Cigarettes     Start date: 1962     Quit date: 2002     Years since quittin.3     Passive exposure: Past    Smokeless tobacco: Never   Vaping Use    Vaping status: Never Used   Substance Use Topics    Alcohol use: Not Currently    Drug use: Not Currently      E-Cigarette/Vaping    E-Cigarette Use Never User       E-Cigarette/Vaping Substances    Nicotine No     THC No     CBD No     Flavoring No     Other No     Unknown No       I have reviewed and agree with the history as documented.     HPI    (Royce Rene) Royce Rene is a 78 y.o. male with a significant PMHx of prostate Ca currently getting oncology infusions (last one 10/9/24) with Dr. Hernandez, PAD, presents to the emergency department on 2024 for bilateral lower extremitiy wounds. Pt reports he has had the wound on his R toe for over a year, but got worse 1 week ago when he accidentally injured his toe on the refrigerator door. Also reports a wound on L foot that has been there for the past 2 months. Reports he has not seen a podiatrist for the past 2 months, but has a home RN that visits weekly. She recommended he present to the ER for evaluation after visiting him today. Patient denies fevers, chills, nausea, vomiting, abdominal pain, or any other complaint at this time.      Allergies include:  No Known Allergies      Immunizations:  Immunization History   Administered Date(s) Administered    COVID-19 MODERNA VACC 0.5 ML IM 2021, 2021, 2021    H1N1, All Formulations 2010    INFLUENZA 2002, 2003, 2004, 2005, 10/01/2006, 10/01/2007, 2007, 2009, 2011, 2012, 2012    Influenza, high dose seasonal 0.7 mL 2022, 2022, 2023    Influenza, seasonal, injectable 10/09/2015, 2017, 2017, 10/24/2018, 2020    Pneumococcal  07/13/2004, 12/14/2012    Pneumococcal Conjugate 13-Valent 10/09/2015    Td (adult), Unspecified 12/16/2003     Immunizations Reviewed.    Review of Systems   Constitutional:  Negative for activity change, fever and unexpected weight change.   Respiratory:  Negative for cough, chest tightness and shortness of breath.    Cardiovascular:  Negative for chest pain and palpitations.   Gastrointestinal:  Negative for abdominal pain, diarrhea, nausea and vomiting.   Genitourinary:  Negative for dysuria and hematuria.   Skin:  Positive for wound.   Allergic/Immunologic: Negative for immunocompromised state.   Neurological:  Negative for syncope.   All other systems reviewed and are negative.          Objective       ED Triage Vitals   Temperature Pulse Blood Pressure Respirations SpO2 Patient Position - Orthostatic VS   10/22/24 1433 10/22/24 1433 10/22/24 1433 10/22/24 1433 10/22/24 1433 10/22/24 1713   98.1 °F (36.7 °C) 87 140/69 16 95 % Lying      Temp Source Heart Rate Source BP Location FiO2 (%) Pain Score    10/22/24 1433 10/22/24 1433 10/22/24 1713 -- --    Oral Monitor Right arm        Vitals      Date and Time Temp Pulse SpO2 Resp BP Pain Score FACES Pain Rating User   10/22/24 1713 98.1 °F (36.7 °C) 80 96 % 18 142/73 -- -- RD   10/22/24 1433 98.1 °F (36.7 °C) 87 95 % 16 140/69 -- -- JK            Physical Exam  Vitals and nursing note reviewed.   Constitutional:       General: He is not in acute distress.     Appearance: Normal appearance. He is not ill-appearing.   HENT:      Head: Normocephalic and atraumatic.      Nose: Nose normal.   Eyes:      Extraocular Movements: Extraocular movements intact.      Conjunctiva/sclera: Conjunctivae normal.   Cardiovascular:      Rate and Rhythm: Normal rate and regular rhythm.      Pulses:           Dorsalis pedis pulses are detected w/ Doppler on the right side and detected w/ Doppler on the left side.        Posterior tibial pulses are detected w/ Doppler on the right side  and detected w/ Doppler on the left side.      Heart sounds: No murmur heard.     No friction rub. No gallop.   Pulmonary:      Effort: Pulmonary effort is normal. No respiratory distress.      Breath sounds: Normal breath sounds.   Abdominal:      General: Bowel sounds are normal.      Palpations: Abdomen is soft.      Tenderness: There is no abdominal tenderness.   Musculoskeletal:         General: No swelling or tenderness. Normal range of motion.      Cervical back: Normal range of motion. No rigidity or tenderness.   Skin:     General: Skin is warm and dry.      Capillary Refill: Capillary refill takes less than 2 seconds.      Comments: Wound to R great toe, with erythema to proximal toe. Dusky appearance to the tip of toe. No obvious bleeding or discharge. Cap refill < 2 seconds.    Superficial wound to dorsum of L foot with surrounding erythema. No erythema tracking proximally.    Please see images below.   Neurological:      Mental Status: He is alert and oriented to person, place, and time.      Cranial Nerves: No cranial nerve deficit.      Sensory: No sensory deficit.      Motor: No weakness.        Media Information      Document Information    Clinical Image - Mobile Device   R toe   10/22/2024 14:38   Attached To:   Hospital Encounter on 10/22/24        Media Information      Document Information    Clinical Image - Mobile Device   L foot   10/22/2024 14:38   Attached To:   Hospital Encounter on 10/22/24   Source Information    Ann Marie Rothman MD   Ed   Document History        Results Reviewed       Procedure Component Value Units Date/Time    RBC Morphology Reflex Test [[id number]] Collected: 10/22/24 1536    Lab Status: Final result Specimen: Blood from Arm, Left Updated: 10/22/24 1701    CBC and differential [[id number]]  (Abnormal) Collected: 10/22/24 1536    Lab Status: Final result Specimen: Blood from Arm, Left Updated: 10/22/24 1623     WBC 9.52 Thousand/uL      RBC 3.92 Million/uL       Hemoglobin 12.1 g/dL      Hematocrit 37.8 %      MCV 96 fL      MCH 30.9 pg      MCHC 32.0 g/dL      RDW 16.4 %      MPV 9.4 fL      Platelets 169 Thousands/uL     Narrative:      This is an appended report.  These results have been appended to a previously verified report.    Manual Differential(PHLEBS Do Not Order) [[id number]]  (Abnormal) Collected: 10/22/24 1536    Lab Status: Final result Specimen: Blood from Arm, Left Updated: 10/22/24 1623     Segmented % 78 %      Bands % 3 %      Lymphocytes % 9 %      Monocytes % 6 %      Eosinophils % 1 %      Basophils % 0 %      Myelocytes % 2 %      Atypical Lymphocytes % 1 %      Absolute Neutrophils 7.71 Thousand/uL      Absolute Lymphocytes 0.95 Thousand/uL      Absolute Monocytes 0.57 Thousand/uL      Absolute Eosinophils 0.10 Thousand/uL      Absolute Basophils 0.00 Thousand/uL      Absolute Myelocytes 0.19 Thousand/uL      Total Counted --     RBC Morphology Present     Platelet Estimate Adequate     Large Platelet Present     Anisocytosis Present     Poikilocytes Present    Procalcitonin [[id number]]  (Normal) Collected: 10/22/24 1536    Lab Status: Final result Specimen: Blood from Arm, Left Updated: 10/22/24 1616     Procalcitonin 0.07 ng/ml     Comprehensive metabolic panel [[id number]]  (Abnormal) Collected: 10/22/24 1536    Lab Status: Final result Specimen: Blood from Arm, Left Updated: 10/22/24 1558     Sodium 141 mmol/L      Potassium 4.0 mmol/L      Chloride 110 mmol/L      CO2 23 mmol/L      ANION GAP 8 mmol/L      BUN 22 mg/dL      Creatinine 0.86 mg/dL      Glucose 81 mg/dL      Calcium 8.0 mg/dL      AST 12 U/L      ALT 20 U/L      Alkaline Phosphatase 69 U/L      Total Protein 6.2 g/dL      Albumin 3.9 g/dL      Total Bilirubin 0.54 mg/dL      eGFR 83 ml/min/1.73sq m     Narrative:      National Kid[employer] guidelines for Chronic Kidney Disease (CKD):     Stage 1 with normal or high GFR (GFR > 90 mL/min/1.73 square meters)    Stage 2  Mild CKD (GFR = 60-89 mL/min/1.73 square meters)    Stage 3A Moderate CKD (GFR = 45-59 mL/min/1.73 square meters)    Stage 3B Moderate CKD (GFR = 30-44 mL/min/1.73 square meters)    Stage 4 Severe CKD (GFR = 15-29 mL/min/1.73 square meters)    Stage 5 End Stage CKD (GFR <15 mL/min/1.73 square meters)  Note: GFR calculation is accurate only with a steady state creatinine            XR toe great min 2 view RIGHT    (Results Pending)   VAS ARTERIAL DUPLEX- LOWER LIMB BILATERAL    (Results Pending)       Procedures    ED Medication and Procedure Management   Prior to Admission Medications   Prescriptions Last Dose Informant Patient Reported? Taking?   Accu-Chek FastClix Lancets MISC  Self Yes No   Sig: TEST 2-3 TIMES AS DIRECTED   Alcohol Swabs (Alcohol Pads) 70 % PADS 10/22/2024 Self Yes Yes   Sig: USE 2-3 TIMES AS DIRECTED.   Blood Glucose Monitoring Suppl (Accu-Chek Guide Me) w/Device KIT 10/22/2024 Self No Yes   Sig: USE AS DIRECTED   Empagliflozin (Jardiance) 10 MG TABS tablet 10/22/2024 Self No Yes   Sig: TAKE 1 TABLET (10 MG TOTAL) BY MOUTH EVERY MORNING   Lancet Devices (Lancing Device) MISC 10/22/2024 Self No Yes   Sig: TID   acetaminophen (TYLENOL) 500 mg tablet Not Taking Self No No   Sig: Take 2 tablets (1,000 mg total) by mouth 3 (three) times a day as needed for mild pain   Patient not taking: Reported on 10/22/2024   aspirin 81 mg chewable tablet  Self No No   Sig: Chew 1 tablet (81 mg total) daily   atorvastatin (LIPITOR) 80 mg tablet 10/22/2024 Self No Yes   Sig: Take 1 tablet (80 mg total) by mouth daily with dinner   cetirizine (ZyrTEC) 5 MG tablet 10/22/2024 Self No Yes   Sig: Take 1 tablet (5 mg total) by mouth daily   clopidogrel (PLAVIX) 75 mg tablet  Self No No   Sig: Take 1 tablet (75 mg total) by mouth daily   ezetimibe (ZETIA) 10 mg tablet 10/22/2024 Self No Yes   Sig: Take 1 tablet (10 mg total) by mouth daily   famotidine (PEPCID) 20 mg tablet  Self No No   Sig: Take 1 tablet (20 mg total)  by mouth daily   ferrous sulfate 325 (65 Fe) mg tablet  Self No No   Sig: Take 1 tablet (325 mg total) by mouth 2 (two) times a day with meals   furosemide (LASIX) 20 mg tablet 10/22/2024 Self No Yes   Sig: TAKE 2 TABLETS (40 MG TOTAL) BY MOUTH DAILY   gabapentin (NEURONTIN) 300 mg capsule 10/22/2024 Self No Yes   Sig: Take 2 capsules (600 mg total) by mouth 2 (two) times a day   glucose blood (Accu-Chek Guide) test strip 10/22/2024 Self No Yes   Sig: TEST 2-3 TIMES AS DIRECTED   loperamide (IMODIUM) 2 mg capsule 10/22/2024 Self No Yes   Sig: Take 1 capsule (2 mg total) by mouth 4 (four) times a day as needed for diarrhea   losartan (COZAAR) 25 mg tablet  Self No No   Sig: TAKE 0.5 TABLETS (12.5 MG TOTAL) BY MOUTH DAILY   methocarbamol (ROBAXIN) 500 mg tablet 10/22/2024 Self No Yes   Sig: TAKE 1 TABLET (500 MG TOTAL) BY MOUTH 3 (THREE) TIMES A DAY   metoprolol succinate (TOPROL-XL) 25 mg 24 hr tablet 10/22/2024 Self No Yes   Sig: Take 1 tablet (25 mg total) by mouth daily   ondansetron (ZOFRAN) 4 mg tablet 10/22/2024 Self No Yes   Sig: Take 1 tablet (4 mg total) by mouth every 8 (eight) hours as needed for nausea or vomiting   polyethylene glycol (GLYCOLAX) 17 GM/SCOOP powder 10/22/2024 Self No Yes   Sig: Take 17 g by mouth every other day Monday Wednesday Friday   Patient taking differently: Take 17 g by mouth every other day Monday Wednesday Friday   dissolve in 8 oz liquid of choice   potassium chloride (Klor-Con M20) 20 mEq tablet 10/22/2024  No Yes   Sig: TAKE 1 TABLET (20 MEQ TOTAL) BY MOUTH DAILY   predniSONE 5 mg tablet 10/22/2024 Self No Yes   Sig: Take 1 tablet (5 mg total) by mouth 2 (two) times a day with meals   senna (SENOKOT) 8.6 MG tablet 10/22/2024 Self No Yes   Sig: Take 1 tablet (8.6 mg total) by mouth 2 (two) times a day To prevent constipation.  Hold one dose for loose stool.      Facility-Administered Medications: None     Current Discharge Medication List        CONTINUE these medications  which have NOT CHANGED    Details   Alcohol Swabs (Alcohol Pads) 70 % PADS USE 2-3 TIMES AS DIRECTED.      atorvastatin (LIPITOR) 80 mg tablet Take 1 tablet (80 mg total) by mouth daily with dinner  Qty: 90 tablet, Refills: 1    Comments: Bubble packs and delivery  Associated Diagnoses: Cardiomyopathy, unspecified type ([facility])      Blood Glucose Monitoring Suppl (Accu-Chek Guide Me) w/Device KIT USE AS DIRECTED  Qty: 1 kit, Refills: 1    Associated Diagnoses: Diabetes mellitus ([facility])      cetirizine (ZyrTEC) 5 MG tablet Take 1 tablet (5 mg total) by mouth daily  Qty: 90 tablet, Refills: 3    Comments: Please blisterpack on next disp  Associated Diagnoses: Allergy, subsequent encounter      Empagliflozin (Jardiance) 10 MG TABS tablet TAKE 1 TABLET (10 MG TOTAL) BY MOUTH EVERY MORNING  Qty: 30 tablet, Refills: 5    Associated Diagnoses: Chronic HFrEF (heart failure with reduced ejection fraction) ([facility])      ezetimibe (ZETIA) 10 mg tablet Take 1 tablet (10 mg total) by mouth daily  Qty: 90 tablet, Refills: 1    Associated Diagnoses: Mixed hyperlipidemia      furosemide (LASIX) 20 mg tablet TAKE 2 TABLETS (40 MG TOTAL) BY MOUTH DAILY  Qty: 60 tablet, Refills: 5    Associated Diagnoses: Chronic combined systolic and diastolic CHF (congestive heart failure) ([facility])      gabapentin (NEURONTIN) 300 mg capsule Take 2 capsules (600 mg total) by mouth 2 (two) times a day  Qty: 120 capsule, Refills: 11    Comments: Blister pack please.  Associated Diagnoses: PAD (peripheral artery disease) ([facility])      glucose blood (Accu-Chek Guide) test strip TEST 2-3 TIMES AS DIRECTED  Qty: 100 each, Refills: 5    Associated Diagnoses: Palliative care patient; Diabetic foot infection  ([facility])      Lancet Devices (Lancing Device) MISC TID  Qty: 100 each, Refills: 2    Associated Diagnoses: Diabetes mellitus ([facility])      loperamide (IMODIUM) 2 mg capsule Take 1 capsule (2 mg total) by mouth 4 (four) times a day as needed for diarrhea  Qty: 30 capsule,  Refills: 2    Comments: Functional diarrhea K59.1 Bubble packs and delivery  Associated Diagnoses: Functional diarrhea      methocarbamol (ROBAXIN) 500 mg tablet TAKE 1 TABLET (500 MG TOTAL) BY MOUTH 3 (THREE) TIMES A DAY  Qty: 90 tablet, Refills: 0    Associated Diagnoses: Chronic midline low back pain      metoprolol succinate (TOPROL-XL) 25 mg 24 hr tablet Take 1 tablet (25 mg total) by mouth daily  Qty: 90 tablet, Refills: 2    Associated Diagnoses: Chronic HFrEF (heart failure with reduced ejection fraction) ([facility])      ondansetron (ZOFRAN) 4 mg tablet Take 1 tablet (4 mg total) by mouth every 8 (eight) hours as needed for nausea or vomiting  Qty: 20 tablet, Refills: 1    Associated Diagnoses: Chemotherapy induced nausea and vomiting      polyethylene glycol (GLYCOLAX) 17 GM/SCOOP powder Take 17 g by mouth every other day Monday Wednesday Friday  Qty: 510 g, Refills: 0    Associated Diagnoses: Therapeutic opioid-induced constipation ([facility])      potassium chloride (Klor-Con M20) 20 mEq tablet TAKE 1 TABLET (20 MEQ TOTAL) BY MOUTH DAILY  Qty: 30 tablet, Refills: 5    Associated Diagnoses: Hypokalemia      predniSONE 5 mg tablet Take 1 tablet (5 mg total) by mouth 2 (two) times a day with meals  Qty: 60 tablet, Refills: 5    Associated Diagnoses: Prostate cancer ([facility])      senna (SENOKOT) 8.6 MG tablet Take 1 tablet (8.6 mg total) by mouth 2 (two) times a day To prevent constipation.  Hold one dose for loose stool.  Qty: 60 tablet, Refills: 11    Associated Diagnoses: Therapeutic opioid-induced constipation ([facility])      Accu-Chek FastClix Lancets MISC TEST 2-3 TIMES AS DIRECTED      acetaminophen (TYLENOL) 500 mg tablet Take 2 tablets (1,000 mg total) by mouth 3 (three) times a day as needed for mild pain    Associated Diagnoses: Ischemic leg pain      aspirin 81 mg chewable tablet Chew 1 tablet (81 mg total) daily  Qty: 90 tablet, Refills: 0    Comments: Bubble packs and delivery  Associated Diagnoses: Ischemic leg  pain      clopidogrel (PLAVIX) 75 mg tablet Take 1 tablet (75 mg total) by mouth daily  Qty: 90 tablet, Refills: 0    Comments: Bubble packs and delivery  Associated Diagnoses: Coronary artery disease involving native heart without angina pectoris, unspecified vessel or lesion type      famotidine (PEPCID) 20 mg tablet Take 1 tablet (20 mg total) by mouth daily  Qty: 30 tablet, Refills: 2    Comments: Bubble packs and delivery  Associated Diagnoses: Gastroesophageal reflux disease without esophagitis      ferrous sulfate 325 (65 Fe) mg tablet Take 1 tablet (325 mg total) by mouth 2 (two) times a day with meals  Qty: 180 tablet, Refills: 0    Comments: Bubble packs and delivery  Associated Diagnoses: Anemia, unspecified type      losartan (COZAAR) 25 mg tablet TAKE 0.5 TABLETS (12.5 MG TOTAL) BY MOUTH DAILY  Qty: 45 tablet, Refills: 1    Associated Diagnoses: Chronic HFrEF (heart failure with reduced ejection fraction) ([facility])           No discharge procedures on file.  ED SEPSIS DOCUMENTATION   Time reflects when diagnosis was documented in both MDM as applicable and the Disposition within this note       Time User Action Codes Description Comment    10/22/2024  4:39 PM Ann Marie Rothman Add [T14.8XXA,  L08.9] Wound infection     10/22/2024  4:47 PM Vini Oliver Add [I73.9] PAD (peripheral artery disease) ([facility])     10/22/2024  4:51 PM Vini Oliver Add [S91.309A] Wound of foot     10/22/2024  5:31 PM Ann Marie Rothman Add [S91.309A] Open wound of foot     10/22/2024  5:32 PM Ann Marie Rothman Modify [S91.309A] Open wound of foot R first toe    10/22/2024  5:32 PM Ann Marie Rothman Modify [I73.9] PAD (peripheral artery disease) ([facility])                  Ann Marie Rothman MD  10/22/24 4596

## 2024-10-22 NOTE — ASSESSMENT & PLAN NOTE
Patient with history of CAD, will continue beta-blocker statin and aspirin and Plavix, although I have strong suspicion he has not been compliant with these

## 2024-10-22 NOTE — TELEPHONE ENCOUNTER
Caller: Royce Rene    Doctor: Dr. Alvarez/2023    Reason for call: appt/[facility] so [facility] cannot schedule/[facility] and then we will call him -has issue with wound on toe/told to call WC to be seen sooner or to call me back after they get ref and I will send force on request to be seen at office    Call back#: [phone]   repleted, improved

## 2024-10-23 ENCOUNTER — APPOINTMENT (INPATIENT)
Dept: VASCULAR ULTRASOUND | Facility: HOSPITAL | Age: 79
DRG: 540 | End: 2024-10-23
Payer: OTHER GOVERNMENT

## 2024-10-23 ENCOUNTER — HOME CARE VISIT (OUTPATIENT)
Dept: HOME HEALTH SERVICES | Facility: HOME HEALTHCARE | Age: 79
End: 2024-10-23
Payer: COMMERCIAL

## 2024-10-23 PROBLEM — M86.9 OSTEOMYELITIS (HCC): Status: ACTIVE | Noted: 2024-10-22

## 2024-10-23 LAB
ALBUMIN SERPL BCG-MCNC: 3.6 G/DL (ref 3.5–5)
ALP SERPL-CCNC: 58 U/L (ref 34–104)
ALT SERPL W P-5'-P-CCNC: 18 U/L (ref 7–52)
ANION GAP SERPL CALCULATED.3IONS-SCNC: 8 MMOL/L (ref 4–13)
AST SERPL W P-5'-P-CCNC: 11 U/L (ref 13–39)
BILIRUB SERPL-MCNC: 0.8 MG/DL (ref 0.2–1)
BUN SERPL-MCNC: 25 MG/DL (ref 5–25)
CALCIUM SERPL-MCNC: 7.9 MG/DL (ref 8.4–10.2)
CHLORIDE SERPL-SCNC: 110 MMOL/L (ref 96–108)
CO2 SERPL-SCNC: 22 MMOL/L (ref 21–32)
CREAT SERPL-MCNC: 0.74 MG/DL (ref 0.6–1.3)
CRP SERPL QL: 2.4 MG/L
ERYTHROCYTE [DISTWIDTH] IN BLOOD BY AUTOMATED COUNT: 16.4 % (ref 11.6–15.1)
ERYTHROCYTE [SEDIMENTATION RATE] IN BLOOD: 7 MM/HOUR (ref 0–19)
EST. AVERAGE GLUCOSE BLD GHB EST-MCNC: 111 MG/DL
GFR SERPL CREATININE-BSD FRML MDRD: 88 ML/MIN/1.73SQ M
GLUCOSE SERPL-MCNC: 104 MG/DL (ref 65–140)
GLUCOSE SERPL-MCNC: 85 MG/DL (ref 65–140)
GLUCOSE SERPL-MCNC: 92 MG/DL (ref 65–140)
GLUCOSE SERPL-MCNC: 94 MG/DL (ref 65–140)
GLUCOSE SERPL-MCNC: 97 MG/DL (ref 65–140)
HBA1C MFR BLD: 5.5 %
HCT VFR BLD AUTO: 34.9 % (ref 36.5–49.3)
HGB BLD-MCNC: 11.1 G/DL (ref 12–17)
MCH RBC QN AUTO: 30.9 PG (ref 26.8–34.3)
MCHC RBC AUTO-ENTMCNC: 31.8 G/DL (ref 31.4–37.4)
MCV RBC AUTO: 97 FL (ref 82–98)
PLATELET # BLD AUTO: 159 THOUSANDS/UL (ref 149–390)
PMV BLD AUTO: 9.6 FL (ref 8.9–12.7)
POTASSIUM SERPL-SCNC: 3.6 MMOL/L (ref 3.5–5.3)
PROT SERPL-MCNC: 5.6 G/DL (ref 6.4–8.4)
RBC # BLD AUTO: 3.59 MILLION/UL (ref 3.88–5.62)
SODIUM SERPL-SCNC: 140 MMOL/L (ref 135–147)
WBC # BLD AUTO: 7.87 THOUSAND/UL (ref 4.31–10.16)

## 2024-10-23 PROCEDURE — 80053 COMPREHEN METABOLIC PANEL: CPT | Performed by: INTERNAL MEDICINE

## 2024-10-23 PROCEDURE — 93922 UPR/L XTREMITY ART 2 LEVELS: CPT | Performed by: SURGERY

## 2024-10-23 PROCEDURE — 93925 LOWER EXTREMITY STUDY: CPT

## 2024-10-23 PROCEDURE — 99223 1ST HOSP IP/OBS HIGH 75: CPT | Performed by: PHYSICIAN ASSISTANT

## 2024-10-23 PROCEDURE — 93925 LOWER EXTREMITY STUDY: CPT | Performed by: SURGERY

## 2024-10-23 PROCEDURE — 87205 SMEAR GRAM STAIN: CPT | Performed by: INTERNAL MEDICINE

## 2024-10-23 PROCEDURE — 87070 CULTURE OTHR SPECIMN AEROBIC: CPT | Performed by: INTERNAL MEDICINE

## 2024-10-23 PROCEDURE — 97167 OT EVAL HIGH COMPLEX 60 MIN: CPT

## 2024-10-23 PROCEDURE — 93923 UPR/LXTR ART STDY 3+ LVLS: CPT

## 2024-10-23 PROCEDURE — 82948 REAGENT STRIP/BLOOD GLUCOSE: CPT

## 2024-10-23 PROCEDURE — 99232 SBSQ HOSP IP/OBS MODERATE 35: CPT | Performed by: INTERNAL MEDICINE

## 2024-10-23 PROCEDURE — 85027 COMPLETE CBC AUTOMATED: CPT | Performed by: INTERNAL MEDICINE

## 2024-10-23 PROCEDURE — 83036 HEMOGLOBIN GLYCOSYLATED A1C: CPT | Performed by: INTERNAL MEDICINE

## 2024-10-23 PROCEDURE — 97535 SELF CARE MNGMENT TRAINING: CPT

## 2024-10-23 PROCEDURE — 85652 RBC SED RATE AUTOMATED: CPT | Performed by: INTERNAL MEDICINE

## 2024-10-23 PROCEDURE — 86140 C-REACTIVE PROTEIN: CPT | Performed by: INTERNAL MEDICINE

## 2024-10-23 PROCEDURE — 97116 GAIT TRAINING THERAPY: CPT

## 2024-10-23 PROCEDURE — 97163 PT EVAL HIGH COMPLEX 45 MIN: CPT

## 2024-10-23 PROCEDURE — 99223 1ST HOSP IP/OBS HIGH 75: CPT | Performed by: PODIATRIST

## 2024-10-23 PROCEDURE — 87186 SC STD MICRODIL/AGAR DIL: CPT | Performed by: INTERNAL MEDICINE

## 2024-10-23 PROCEDURE — 87077 CULTURE AEROBIC IDENTIFY: CPT | Performed by: INTERNAL MEDICINE

## 2024-10-23 RX ORDER — ENOXAPARIN SODIUM 100 MG/ML
40 INJECTION SUBCUTANEOUS
Status: DISCONTINUED | OUTPATIENT
Start: 2024-10-24 | End: 2024-10-28 | Stop reason: HOSPADM

## 2024-10-23 RX ORDER — CEFEPIME HYDROCHLORIDE 2 G/50ML
2000 INJECTION, SOLUTION INTRAVENOUS EVERY 12 HOURS
Status: DISCONTINUED | OUTPATIENT
Start: 2024-10-23 | End: 2024-10-24

## 2024-10-23 RX ORDER — LOPERAMIDE HYDROCHLORIDE 2 MG/1
2 CAPSULE ORAL 3 TIMES DAILY PRN
Status: DISCONTINUED | OUTPATIENT
Start: 2024-10-23 | End: 2024-10-23

## 2024-10-23 RX ORDER — VANCOMYCIN HYDROCHLORIDE 1 G/200ML
10 INJECTION, SOLUTION INTRAVENOUS EVERY 12 HOURS
Status: DISCONTINUED | OUTPATIENT
Start: 2024-10-23 | End: 2024-10-28 | Stop reason: HOSPADM

## 2024-10-23 RX ADMIN — GABAPENTIN 600 MG: 300 CAPSULE ORAL at 17:08

## 2024-10-23 RX ADMIN — GABAPENTIN 600 MG: 300 CAPSULE ORAL at 08:47

## 2024-10-23 RX ADMIN — CEFEPIME HYDROCHLORIDE 2000 MG: 2 INJECTION, SOLUTION INTRAVENOUS at 17:11

## 2024-10-23 RX ADMIN — ENOXAPARIN SODIUM 100 MG: 100 INJECTION SUBCUTANEOUS at 08:48

## 2024-10-23 RX ADMIN — METOPROLOL SUCCINATE 25 MG: 25 TABLET, EXTENDED RELEASE ORAL at 08:48

## 2024-10-23 RX ADMIN — VANCOMYCIN HYDROCHLORIDE 1000 MG: 1 INJECTION, SOLUTION INTRAVENOUS at 21:12

## 2024-10-23 RX ADMIN — VANCOMYCIN HYDROCHLORIDE 1000 MG: 1 INJECTION, SOLUTION INTRAVENOUS at 08:51

## 2024-10-23 RX ADMIN — ATORVASTATIN CALCIUM 80 MG: 40 TABLET, FILM COATED ORAL at 15:56

## 2024-10-23 RX ADMIN — LOSARTAN POTASSIUM 12.5 MG: 25 TABLET, FILM COATED ORAL at 08:48

## 2024-10-23 RX ADMIN — ACETAMINOPHEN 650 MG: 325 TABLET, FILM COATED ORAL at 09:35

## 2024-10-23 RX ADMIN — CLOPIDOGREL 75 MG: 75 TABLET ORAL at 08:47

## 2024-10-23 RX ADMIN — CEFEPIME HYDROCHLORIDE 2000 MG: 2 INJECTION, SOLUTION INTRAVENOUS at 05:50

## 2024-10-23 RX ADMIN — PREDNISONE 5 MG: 10 TABLET ORAL at 15:56

## 2024-10-23 RX ADMIN — PREDNISONE 5 MG: 10 TABLET ORAL at 08:48

## 2024-10-23 RX ADMIN — EZETIMIBE 10 MG: 10 TABLET ORAL at 08:48

## 2024-10-23 RX ADMIN — OXYCODONE HYDROCHLORIDE 5 MG: 5 TABLET ORAL at 23:28

## 2024-10-23 RX ADMIN — POTASSIUM CHLORIDE 20 MEQ: 1500 TABLET, EXTENDED RELEASE ORAL at 08:48

## 2024-10-23 RX ADMIN — ACETAMINOPHEN 650 MG: 325 TABLET, FILM COATED ORAL at 22:46

## 2024-10-23 RX ADMIN — ASPIRIN 81 MG 81 MG: 81 TABLET ORAL at 08:48

## 2024-10-23 RX ADMIN — FUROSEMIDE 20 MG: 20 TABLET ORAL at 08:48

## 2024-10-23 NOTE — CONSULTS
Podiatry - Consultation    Patient Information:   Royce Rene 78 y.o. male MRN: [id number]  Unit/Bed#: -01 Encounter: [id number]  PCP: Anne Chandra MD  Date of Admission:  10/22/2024  Date of Consultation: 10/23/24  Requesting Physician: Vini Oliver DO      ASSESSMENT & PLAN::    Royce Rene is a 78 y.o. male with:    Osteomyelitis right distal phalanx  Infection:  Current Abx: IV Vanc & Cefepime  No culturable wound at this time  Imaging: Radiolucency in distal aspect of distal phalanx that may be consistent with osteomyelitis.  Vascular: LEADs pending  Plan:  High clinical suspicion for osteomyelitis of the right hallux distal phalanx.  Explained the treatment options to the patient.  He understands he would likely require a partial toe amputation.  We wait until we have the results and any necessary vascular mention prior to proceeding with surgery.  Dressing changes/wound care: Local wound care consisting of Betadine with dry sterile dressings.  To be changed once every 40 hours.  Weightbearing: WBAT in postop shoe  Elevation and offloading on green foam wedges or pillows when non-ambulatory.  Rest of care by primary care team.  Will discuss this plan with my attending and update as needed.  Podiatry to continue to follow.  Peripheral Arterial Disease  LEADs pending  CAD  CHF  Vascular dementia        SUBJECTIVE:    History of Present Illness:    Royce Rene is a 78 y.o. male with past medical history of peripheral chill disease, coronary artery disease, prostate cancer undergoing treatment, congestive heart failure, vascular dementia, and chronic wound to right toe who was admitted 10/22/2024 due to lower extremity wounds.   Podiatry was consulted for right hallux wound.  Patient states he has had it for over a year, but that he jammed it against the refrigerator last week and since then its gotten worse..  States he had previous vascular events performed his right side, but not on his left side.  He  also has a wound to his left lateral foot, states he believes he got it when he fell out of bed.  States he had smoked for many years, quit in 2003.  He denies nausea, fever or chills.    Review of Systems:    See History for ROS. Review of systems otherwise negative, except for noted.    Past Medical and Surgical History:     Past Medical History:   Diagnosis Date    Cancer ([facility]) 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    Coronary artery disease 2001    S/p stenting to circumflex and RCA    HFrEF (heart failure with reduced ejection fraction) ([facility]) 06/14/2021    High cholesterol     Prostate cancer ([facility])        Past Surgical History:   Procedure Laterality Date    CARDIAC ELECTROPHYSIOLOGY PROCEDURE N/A 12/23/2021    Procedure: Implant ICD - bi ventricular;  Surgeon: Jonas Oviedo MD;  Location: [facility] CARD[facility]AC CATH LAB;  Service: Cardiology    CARDIAC SURGERY      IR LOWER EXTREMITY ANGIOGRAM  4/18/2023    [place] TEAEC W/WO PATCH GRAFT COMMON FEMORAL Right 7/9/2021    Procedure: ENDARTERECTOMY ARTERIAL FEMORAL;  Surgeon: Serina Cook DO;  Location: [facility];  Service: Vascular       Meds/Allergies:      Medications Prior to Admission:     Alcohol Swabs (Alcohol Pads) 70 % PADS    atorvastatin (LIPITOR) 80 mg tablet    Blood Glucose Monitoring Suppl (Accu-Chek Guide Me) w/Device KIT    cetirizine (ZyrTEC) 5 MG tablet    Empagliflozin (Jardiance) 10 MG TABS tablet    ezetimibe (ZETIA) 10 mg tablet    furosemide (LASIX) 20 mg tablet    gabapentin (NEURONTIN) 300 mg capsule    glucose blood (Accu-Chek Guide) test strip    Lancet Devices (Lancing Device) MISC    loperamide (IMODIUM) 2 mg capsule    methocarbamol (ROBAXIN) 500 mg tablet    metoprolol succinate (TOPROL-XL) 25 mg 24 hr tablet    ondansetron (ZOFRAN) 4 mg tablet    polyethylene glycol (GLYCOLAX) 17 GM/SCOOP powder    potassium chloride (Klor-Con M20) 20 mEq tablet    predniSONE 5 mg tablet    senna (SENOKOT) 8.6 MG tablet    Accu-Chek FastClix Lancets MISC    acetaminophen  "(TYLENOL) 500 mg tablet    aspirin 81 mg chewable tablet    clopidogrel (PLAVIX) 75 mg tablet    famotidine (PEPCID) 20 mg tablet    ferrous sulfate 325 (65 Fe) mg tablet    losartan (COZAAR) 25 mg tablet    No Known Allergies    Social History:     Marital Status:     Substance Use History:   Social History     Substance and Sexual Activity   Alcohol Use Not Currently     Social History     Tobacco Use   Smoking Status Former    Current packs/day: 0.00    Types: Cigarettes    Start date: 1962    Quit date: 2002    Years since quittin.3    Passive exposure: Past   Smokeless Tobacco Never     Social History     Substance and Sexual Activity   Drug Use Not Currently       Family History:    Family History   Problem Relation Age of Onset    Cancer Mother          OBJECTIVE:    Vitals:   Blood Pressure: 135/62 (10/23/24 0755)  Pulse: 87 (10/23/24 0755)  Temperature: 99.2 °F (37.3 °C) (10/23/24 0755)  Temp Source: Oral (10/23/24 0755)  Respirations: 20 (10/23/24 0755)  Height: 5' 7\" (170.2 cm) (10/22/24 1713)  Weight - Scale: 98.4 kg (217 lb) (10/23/24 0544)  SpO2: 96 % (10/23/24 0755)    Physical Exam:    General Appearance: Alert, cooperative, no distress.  HEENT: Head normocephalic, atraumatic, without obvious abnormality.  Heart: Normal rate and rhythm.  Lungs: Non-labored breathing. No respiratory distress.  Abdomen: Without distension.  Psychiatric: AAOx3    Lower Extremity Focused Exam:    Vascular: DP PT pulses weakly palpable bilateral feet.  No edema.    Dermatological:  Lower extremity wound(s) as noted below:  Clinical Images 10/23/24:      Neurological:  Light touch sensation intact.  Protective sensation diminished bilateral feet.    Musculoskeletal:  No gross deformities noted.         Additional data:     Lab Results: I have personally reviewed pertinent labs including:    Results from last 7 days   Lab Units 10/23/24  0543 10/22/24  1536   WBC Thousand/uL 7.87 9.52   HEMOGLOBIN g/dL " "11.1* 12.1   HEMATOCRIT % 34.9* 37.8   PLATELETS Thousands/uL 159 169   LYMPHO PCT %  --  9*   MONO PCT %  --  6   EOS PCT %  --  1     Results from last 7 days   Lab Units 10/23/24  0543   POTASSIUM mmol/L 3.6   CHLORIDE mmol/L 110*   CO2 mmol/L 22   BUN mg/dL 25   CREATININE mg/dL 0.74   CALCIUM mg/dL 7.9*   ALK PHOS U/L 58   ALT U/L 18   AST U/L 11*           Cultures: I have personally reviewed pertinent cultures including:              Imaging: I have personally reviewed pertinent reports in PACS.  EKG, Pathology, and Other Studies: I have personally reviewed pertinent reports.    Time Spent for Care: 30 minutes.  More than 50% of total time spent on counseling and coordination of care as described above.      ** Please Note: Portions of the record may have been created with voice recognition software. Occasional wrong word or \"sound a like\" substitutions may have occurred due to the inherent limitations of voice recognition software. Read the chart carefully and recognize, using context, where substitutions have occurred. **    "

## 2024-10-23 NOTE — PLAN OF CARE
Problem: PAIN - ADULT  Goal: Verbalizes/displays adequate comfort level or baseline comfort level  Description: Interventions:  - Encourage patient to monitor pain and request assistance  - Assess pain using appropriate pain scale  - Administer analgesics based on type and severity of pain and evaluate response  - Implement non-pharmacological measures as appropriate and evaluate response  - Consider cultural and social influences on pain and pain management  - Notify physician/advanced practitioner if interventions unsuccessful or patient reports new pain  Outcome: Progressing     Problem: INFECTION - ADULT  Goal: Absence or prevention of progression during hospitalization  Description: INTERVENTIONS:  - Assess and monitor for signs and symptoms of infection  - Monitor lab/diagnostic results  - Monitor all insertion sites, i.e. indwelling lines, tubes, and drains  - Monitor endotracheal if appropriate and nasal secretions for changes in amount and color  - [place] appropriate cooling/warming therapies per order  - Administer medications as ordered  - Instruct and encourage patient and family to use good hand hygiene technique  - Identify and instruct in appropriate isolation precautions for identified infection/condition  Outcome: Progressing  Goal: Absence of fever/infection during neutropenic period  Description: INTERVENTIONS:  - Monitor WBC    Outcome: Progressing     Problem: SAFETY ADULT  Goal: Patient will remain free of falls  Description: INTERVENTIONS:  - Educate patient/family on patient safety including physical limitations  - Instruct patient to call for assistance with activity   - Consult OT/PT to assist with strengthening/mobility   - Keep Call bell within reach  - Keep bed low and locked with side rails adjusted as appropriate  - Keep care items and personal belongings within reach  - Initiate and maintain comfort rounds  - Make Fall Risk Sign visible to staff  - Offer Toileting every 2 Hours,  in advance of need  - Initiate/Maintain bed alarm  - Obtain necessary fall risk management equipment: yellow socks, fall mat  - Apply yellow socks and bracelet for high fall risk patients  - Consider moving patient to room near nurses station  Outcome: Progressing  Goal: Maintain or return to baseline ADL function  Description: INTERVENTIONS:  -  Assess patient's ability to carry out ADLs; assess patient's baseline for ADL function and identify physical deficits which impact ability to perform ADLs (bathing, care of mouth/teeth, toileting, grooming, dressing, etc.)  - Assess/evaluate cause of self-care deficits   - Assess range of motion  - Assess patient's mobility; develop plan if impaired  - Assess patient's need for assistive devices and provide as appropriate  - Encourage maximum independence but intervene and supervise when necessary  - Involve family in performance of ADLs  - Assess for home care needs following discharge   - Consider OT consult to assist with ADL evaluation and planning for discharge  - Provide patient education as appropriate  Outcome: Progressing

## 2024-10-23 NOTE — CASE MANAGEMENT
Case Management Assessment & Discharge Planning Note    Patient name Royce BAER Free  Location /-01 MRN [id number]  : 1945 Date 10/23/2024       Current Admission Date: 10/22/2024  Current Admission Diagnosis:Wound of foot   Patient Active Problem List    Diagnosis Date Noted Date Diagnosed    Wound of foot 10/22/2024     Prevention of chemotherapy-induced neutropenia 2024     Acute on chronic diastolic CHF (congestive heart failure) ([facility]) 2024     Frail elder // vulnerable adult 2024     Elevated liver enzymes 2024     Proctitis 2024     Class 2 severe obesity due to excess calories with serious comorbidity and body mass index (BMI) of 36.0 to 36.9 in adult ([facility]) 2023     Moderate vascular dementia ([facility]) 2023     Ambulatory dysfunction 2022     Peripheral neuropathy 2022     Financial problems 2022     Cancer-related pain 05/10/2022     Palliative care patient 05/10/2022     Malignant neoplasm metastatic to bone ([facility]) 2022     Embolism and 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 ([facility]) 2022     Depression, recurrent ([facility]) 2022     Port-A-Cath in place 10/05/2021     PAD (peripheral artery disease) ([facility]) 2021     Urinary retention 2021     CAD (coronary artery disease) 07/10/2021     Ischemic leg pain 2021     Prostate cancer ([facility]) 2021     Ischemic cardiomyopathy 2021     Chronic combined systolic and diastolic CHF (congestive heart failure) ([facility]) 2021     S/P AVR 2021     Anemia 2021       LOS (days): 1  Geometric Mean LOS (GMLOS) (days): 4.2  Days to GMLOS:3.5     OBJECTIVE:    Risk of Unplanned Readmission Score: 19.62      Current admission status: Inpatient     Preferred Pharmacy:   [facility], [facility] [place] -  [facility]   [facility] [address]  Phone: [phone] Fax: [phone]    [facility] [place] - [address]  [address]  [address]  [place] [address]  Phone: [phone] Fax: [phone]    [facility] [place] - [place] - [address]  [address]  [address]  [place] [address]  Phone: [phone] Fax: [phone]    [place], [place] - [address] [place], [place] [address]  [address] [facility] [address]  Phone: [phone] Fax: [phone]    Primary Care Provider: Anne Chandra MD    Primary Insurance: [employer]  Secondary Insurance: [employer] REP    ASSESSMENT:  Active Health Care Proxies    There are no active Health Care Proxies on file.    Readmission Root Cause  30 Day Readmission: No    Patient Information  Admitted from:: Home  Mental Status: Alert  During Assessment patient was accompanied by: Not accompanied during assessment  Assessment information provided by:: Patient  Primary Caregiver: Self  Support Systems: Self, Son  County of Residence: [place]  What [facility] do you live in?: [place]  Home entry access options. Select all that apply.: No steps to enter home  Type of Current Residence: 2 story home  Upon entering residence, is there a bedroom on the main floor (no further steps)?: No  A bedroom is located on the following floor levels of residence (select all that apply):: 2nd Floor  Upon entering residence, is there a bathroom on the main floor (no further steps)?: No  Indicate which floors of current residence have a bathroom (select all the apply):: 2nd Floor  Number of steps to 2nd floor from main floor: One Flight (Patient has chair lift)  Living Arrangements: Lives w/ Son  Is patient a ?: Yes ([place])  Is patient active with [facility]: Yes  Is patient service connected?: Yes ([facility])    Activities of Daily Living Prior to Admission  Functional Status: Independent  Completes ADLs independently?: Yes  Ambulates independently?: Yes  Does patient use assisted devices?: Yes  Assisted Devices (DME)  used: Electric wheelchair, Stair Chair/Liberal  Does patient currently own DME?: Yes  What DME does the patient currently own?: Electric Wheelchair, Stair Chair/Glide  Does patient have a history of Outpatient Therapy (PT/OT)?: No  Does the patient have a history of Short-Term Rehab?: No  Does patient have a history of HHC?: Yes  Does patient currently have HHC?: Yes    Current Home Health Care  Type of Current Home Care Services: Home PT, Nurse visit  Home Health Agency Name:: [facility]  Current Home Health Follow-Up Provider:: PCP    Patient Information Continued  Income Source: SSI/SSD  Does patient have prescription coverage?: Yes  Does patient receive dialysis treatments?: No  Does patient have a history of substance abuse?: No  Does patient have a history of Mental Health Diagnosis?: No    Means of Transportation  Means of Transport to Appts:: Other (Comment) ([facility])    Social Determinants of Health (SDOH)      Flowsheet Row Most Recent Value   Housing Stability    In the last 12 months, was there a time when you were not able to pay the mortgage or rent on time? N   In the past 12 months, how many times have you moved where you were living? 0   At any time in the past 12 months, were you homeless or living in a shelter (including now)? N   Transportation Needs    In the past 12 months, has lack of transportation kept you from medical appointments or from getting medications? no   In the past 12 months, has lack of transportation kept you from meetings, work, or from getting things needed for daily living? No   Food Insecurity    Within the past 12 months, you worried that your food would run out before you got the money to buy more. Never true   Within the past 12 months, the food you bought just didn't last and you didn't have money to get more. Never true   Utilities    In the past 12 months has the electric, gas, oil, or water company threatened to shut off services in your home? No     DISCHARGE  DETAILS:    Discharge planning discussed with:: Patient  Freedom of Choice: Yes     CM contacted family/caregiver?: No- see comments (Patient declined)  Were Treatment Team discharge recommendations reviewed with patient/caregiver?: Yes  Did patient/caregiver verbalize understanding of patient care needs?: Yes  Were patient/caregiver advised of the risks associated with not following Treatment Team discharge recommendations?: Yes    Contacts  Patient Contacts: Rodrigue Rene (son)  Relationship to Patient:: Family  Contact Method: Phone  Phone Number: [phone]  Reason/Outcome: Discharge Planning, Emergency Contact    Requested Home Health Care         Is the patient interested in HHC at discharge?: Yes  Home Health Discipline requested:: Nursing, Physical Therapy  Home Health Agency Name:: [facility] Follow-Up Provider:: KENDALL  Home Health Services Needed:: Evaluate Functional Status and Safety, Heart Failure Management, Wound/Ostomy Care  Homebound Criteria Met:: Uses an Assist Device (i.e. cane, walker, etc), Requires the Assistance of Another Person for Safe Ambulation or to Leave the Home  Supporting Clincal Findings:: Dyspnea with Exertion, Fatigues Easliy in Short Distances, Limited Endurance    DME Referral Provided  Referral made for DME?: No    Other Referral/Resources/Interventions Provided:  Interventions: None Indicated    Would you like to participate in our [facility] service program?  : No - Declined    Treatment Team Recommendation: Home with Home Health Care  Discharge Destination Plan:: Home with Home Health Care  Transport at Discharge : Family    CM met with the patient bedside. The patient report she lives with his son and is active with [facility]. The patient denied having any unmet needs at home/in community. CM send a referral to MANISHA leonela/ LUCITA through [facility].

## 2024-10-23 NOTE — PLAN OF CARE
Problem: OCCUPATIONAL THERAPY ADULT  Goal: Performs self-care activities at highest level of function for planned discharge setting.  See evaluation for individualized goals.  Description: Treatment Interventions: ADL retraining, Functional transfer training, Cognitive reorientation, Equipment evaluation/education, Compensatory technique education          See flowsheet documentation for full assessment, interventions and recommendations.   Note: Limitation: Decreased ADL status, Decreased self-care trans, Decreased high-level ADLs, Decreased cognition, Decreased endurance, Decreased Safe judgement during ADL  Prognosis: Good  Assessment: Pt is a 78 y.o. male [facility] for OT [facility], admitted 10/22/2024 w/ Wound of foot.  Comorbidities affecting pt's functional performance at time of assessment include: prostate cancer undergoing treatment with hematology oncology, CAD status post stenting, heart failure with reduced ejection fraction, peripheral arterial disease, vascular dementia .  Prior to admission, pt was living in a 2nd floor of home, with son residing on 3rd floor.  Pt was I w/  ADLS and required some assist with IADLS, & required rollator and scooter  PTA. Upon evaluation, pt appears to be performing below baseline functional status. Pt currently requires mod I for bed mobility, sup-min A for functional mobility/transfers, sup for UB ADLs and min-mod Afor LB ADLS 2* the following deficits impacting occupational performance: weakness, decreased strength, decreased balance, decreased tolerance, impaired memory, decreased safety awareness, and increased pain. Full objective findings from OT assessment regarding body systems outlined above. Personal factors affecting pt at time of IE include:steps to enter environment, difficulty performing ADLS, difficulty performing IADLS , and decreased functional mobility . These impairments, as well as decreased caregiver support and risk for  falls  limit pt's ability to safely engage in all baseline areas of occupation and mobility. Pt to benefit from continued skilled OT tx while in the hospital to address deficits as defined above and maximize level of functional independence w ADL's and functional mobility. Occupational Performance areas to address include: bathing/shower, toilet hygiene, dressing, medication management, and functional mobility.  This evaluation required an extensive review of medical and/or therapy records and additional review of physical, cognitive and psychosocial history related to functional performance. Based upon functional performance deficits and assessments, this evaluation has been identified as a high complexity evaluation.  At this time, OT recommendations at time of discharge are level 3 low intensity resources. Pt with ongoing podiatry/vascular f/u. If change in WB/functional status, OT to re-evaluate as needed.     Rehab Resource Intensity Level, OT: III (Minimum Resource Intensity) (Pt with ongoing podiatry/vascular f/u. If change in WB/functional status, OT to re-evaluate as needed.)

## 2024-10-23 NOTE — PHYSICAL THERAPY NOTE
PHYSICAL THERAPY Evaluation and Treatment  DATE: 10/23/24  TIME: 6585-7901    NAME:  Royce Rene  AGE:   78 y.o.  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    Coronary artery disease 2001    S/p stenting to circumflex and RCA    HFrEF (heart failure with reduced ejection fraction) ([facility]) 06/14/2021    High cholesterol     Prostate cancer ([facility])      Past Surgical History:   Procedure Laterality Date    CARDIAC ELECTROPHYSIOLOGY PROCEDURE N/A 12/23/2021    Procedure: Implant ICD - bi ventricular;  Surgeon: Jonas Oviedo MD;  Location: [facility] CARD[facility] CATH [facility];  Service: Cardiology    CARDIAC SURGERY      IR LOWER EXTREMITY ANGIOGRAM  4/18/2023    MD TEAEC W/WO PATCH GRAFT COMMON FEMORAL Right 7/9/2021    Procedure: ENDARTERECTOMY ARTERIAL FEMORAL;  Surgeon: Serina Cook DO;  Location: [facility];  Service: Vascular        10/23/24 1028   PT Last Visit   PT Visit Date 10/23/24   Note Type   Note type Evaluation   Additional Comments 79 y/o presents due to chronic BLE wounds and pain with walking (right worse than left) and fatigue. Imaging suggest right hallux osteomyelitis.   Pain Assessment   Pain Assessment Tool 0-10   Pain Score 6   Pain Location/Orientation Location: Foot;Orientation: Bilateral  (right (great toe and heel) worse than left.)   Restrictions/Precautions   Weight Bearing Precautions Per Order Yes   RLE Weight Bearing Per Order WBAT  (in surgical shoe)   Home Living   Additional Comments Pt lives alone in 2nd floor apartment (within a single family home), son lives in separate apartment.  Stair lift to access second floor, than 4 steps to enter living area, with rails.  Shower/tub with bars and chair. Standard toilet with chair.  DME: walker, cane, electric scooter (inoperable at this time).   Prior Function    Level of [place] Independent with ADLs;Independent with functional mobility;Needs assistance with functional mobility   Lives With Son   Receives Help From Family   IADLs Family/Friend/Other provides transportation;Family/Friend/Other provides meals;Family/Friend/Other provides medication management  (Relies on [facility] to go to chemo appts. Reports that all of his meds are pre-sorted in a single packet and he takes them in the morning.)   Falls in the last 6 months 1 to 4   Vocational Retired   General   Family/Caregiver Present No   Cognition   Overall Cognitive Status Impaired   Arousal/Participation Alert   Attention Difficulty dividing attention   Orientation Level Oriented X4   Memory Decreased recall of precautions   Following Commands Follows one step commands with increased time or repetition   Subjective   Subjective Pt perseverating on right foot pain, and repeatedly expresses that he has a difficulty time modifying activity due to dependence on right LE   RUE Assessment   RUE Assessment WFL   LUE Assessment   LUE Assessment WFL   RLE Assessment   RLE Assessment   (hip flex 3+/5, hip abd/add 4-/5.  knee grossly 3+/5.  ankle NT)   LLE Assessment   LLE Assessment   (hip flex 4-/5, hip abd/add 4-/5.  knee 5/5. ankle 5/5)   Coordination   Movements are Fluid and Coordinated 1   Bed Mobility   Supine to Sit 6  Modified independent   Transfers   Sit to Stand 4  Minimal assistance   Additional items Assist x 1;Bedrails;Armrests;Increased time required;Impulsive;Verbal cues  (pt struggles to make modificiation for safety, changes in environment, or to manage pain.)   Stand to Sit 4  Minimal assistance   Additional items Assist x 1;Armrests;Bedrails;Increased time required;Impulsive;Verbal cues  (pt struggles to make modificiation for safety, changes in environment, or to manage pain.)   Ambulation/Elevation   Gait Assistance 4  Minimal assist   Additional items Assist x 1;Verbal cues;Tactile cues    Assistive Device Rolling walker   Distance 15'x2   Ambulation/Elevation Additional Comments Pt with tendency to flex forward at trunk/hips and advance walker forward beyond proper distance.  with frequent cues and instruction, pt able to better manage postural corrections and walker management.  Attempted gait training to rely on walker to off-weight right foot for comfort.  Pt able to demonstrate step-to gait pattern, but appears to have difficulty managing weight bearing due to generalized weakness and pain.  Despite this, pt did manage to ambulate to/from bathroom with min A/CGA for safety and gait corrections.   Balance   Static Sitting Good   Static Standing Fair -  (RW)   Ambulatory Fair -  (RW)   Endurance Deficit   Endurance Deficit Yes   Activity Tolerance   Activity Tolerance Patient limited by fatigue;Patient limited by pain   Medical Staff Made Aware collaborated with OT   Assessment   Prognosis Fair   Problem List Decreased strength;Decreased endurance;Impaired balance;Decreased mobility;Impaired judgement;Decreased safety awareness;Decreased cognition;Pain;Orthopedic restrictions   Assessment Orders for PT eval and treat received.  Pt's PMHx: chronic LBP, CAD with stent, Cx, dementia. Pt exhibits physical deficits noted in problem list above.  Deficits listed contribute to functional limitations that are significant from the patient PLOF and include: difficulty with bed mobility, impaired standing balance, inability to perform safe transfers, tolerance for OOB functional activities, unsafe/inefficient ambulation, fall risk, and unable to safely manage stairs/steps/ramp    Additional considerations affecting pt's discharge planning: Insufficient or unavailable assistance at home, Unfavorable, dangerous, or deplorable home environment, Significant amount of stairs to manage in order to access living quarters, Progressive cognitive decline affecting safety awareness/insight, Inability to manage basic  self care ADLs with the assistance that is currently available, Inability to perform necessary transfers and/or mobility out of bed, with the assistance that is currently available, and High risk for re-admission due to non-compliance with healthcare provider intervention/s    During today's session, formal PT evaluation performed.  Pt fitted for Post-op shoe and educated on proper fit and utilization.  Pt seemed to struggle to manage bed mobility and transfers due to generalized weakness and body habitus.  Pt expressed multiple c/o pain throughout session while transferring and standing due to anahi feet with weight bearing.  Despite instruction and education, pt struggles to modify ambulation and walker use to maintain mobility while managing pain.  Pt exhibits instability and presents as an increased fall risk due to decreased safety awareness.  Despite pain, physical deficits, and cognition deficits he is able to manage basic transfers and mobility, but would likely need increased assistance to manage safely at home.  However, expect pt may need rehab facility placement if any changes in weight bearing restrictions or pt lacks appropriate assistance.      The -PAC Mobility Score was used to assist in determining pt safety w/ mobility/self care & appropriate d/c recommendations, see above for scores. Patient's clinical presentation is unstable/unpredictable due to significant acute change in functional independence, uncontrolled pain, abnormal lab values, significant need for care assistance compared to baseline, altered mental status, ongoing medical management needs, and complicated social/support system.   Barriers to Discharge Decreased caregiver support;Inaccessible home environment   Goals   Patient Goals Pt desires for pain to resolve   STG Expiration Date 11/02/24   Short Term Goal #1 1: Pt will perform rolling to either side in flat bed, independently.  2: Pt will perform sit<>supine on flat bed,  independently.  3: Pt will perform stand pivot transfer with RW while managing wbing restrictions, sup A.  4: Pt will ambulate 50'x2 with RW while managing wbing restrictions, sup A. 5: Pt will perform written HEP, with cues. 6: Pt will ascend/descend stairs necessary to access his living spaces, while managing wbing restrictions, sup A.   Plan   Treatment/Interventions Functional transfer training;LE strengthening/ROM;Elevations;Therapeutic exercise;Endurance training;Patient/family training;Equipment eval/education;Bed mobility;Gait training;Compensatory technique education   PT Frequency 2-3x/wk   Discharge Recommendation   Rehab Resource Intensity Level, PT III (Minimum Resource Intensity)   AM-PAC Basic Mobility Inpatient   Turning in Flat Bed Without Bedrails 3   Lying on Back to Sitting on Edge of Flat Bed Without Bedrails 3   Moving Bed to Chair 3   Standing Up From Chair Using Arms 3   Walk in Room 3   Climb 3-5 Stairs With Railing 2   Basic Mobility Inpatient Raw Score 17   Basic Mobility Standardized Score 39.67   [facility] Highest Level Of Mobility   [facility] Goal 5: Stand one or more mins   -[facility] Achieved 6: Walk 10 steps or more   Additional Treatment Session   Start Time 1038   End Time 1049   Treatment Assessment Educated and instructed pt on safe sit<>stand transfer techniques from EOB, commode, and recliner; while minimizing pain and optimizing safety.  Limited carry over and noted impulsive behavior. Educated on proper fit and utilization of walker.  Limited carry over.  Pt required max cues and instruction for postural corrections, walker use, and gait training to manage pain and stability.  Limited tolerance for ambulation, due to pt having difficulty following cues to optimize safe gait.   End of Consult   Patient Position at End of Consult Bedside chair;All needs within reach;Bed/Chair alarm activated   The patient's [facility] Basic Mobility Inpatient Short Form Raw Score is 17. A Raw score of  greater than or equal to 16 suggests the patient may benefit from discharge to home. Please also refer to the recommendation of the Physical Therapist for safe discharge planning.    Nish Lind, PT, DPT  [place] Licensure #[id number]

## 2024-10-23 NOTE — ASSESSMENT & PLAN NOTE
Patient with history of vascular dementia. Reports that he lives in an apartment complex with his son  PT/OT cognitive evaluation  Supportive care

## 2024-10-23 NOTE — ASSESSMENT & PLAN NOTE
History of PAD and s/p stenting  HEATHER duplex studying pending  Continue ASA and statin therapy  IR consulted for RLE angiogram +/- intervention  Vascular surgery consulted, recommendations are appreciated

## 2024-10-23 NOTE — UTILIZATION REVIEW
"Initial Clinical Review    Admission: Date/Time/Statement:   Admission Orders (From admission, onward)       Ordered        10/22/24 1640  INPATIENT ADMISSION  Once                          Orders Placed This Encounter   Procedures    INPATIENT ADMISSION     Standing Status:   Standing     Number of Occurrences:   1     Order Specific Question:   Level of Care     Answer:   Med Surg [16]     Order Specific Question:   Estimated length of stay     Answer:   More than 2 Midnights     Order Specific Question:   Certification     Answer:   I certify that inpatient services are medically necessary for this patient for a duration of greater than two midnights. See H&P and MD Progress Notes for additional information about the patient's course of treatment.     ED Arrival Information       Expected   10/22/2024 14:21    Arrival   10/22/2024 14:29    Acuity   Urgent              Means of arrival   Ambulance    Escorted by   [facility]    Service   Hospitalist    Admission type   Emergency              Arrival complaint   -             Chief Complaint   Patient presents with    Wound Check     Pt reports wounds on bilateral feet, left outer foot and right great toe \"for months\" Pain with walking        Initial Presentation: 78 y.o. male who presented by EMS to [facility] ED. Admitted as Inpatient for evaluation and treatment of Wound of foot     PMHx:  has a past medical history of Cancer ([facility]) (01/2002), Coronary artery disease (2001), HFrEF (heart failure with reduced ejection fraction) ([facility]) (06/14/2021), High cholesterol, and Prostate cancer ([facility]).      Presented w/ lower extremity wound after hitting his R big toe on the refrigerator door about a wk ago and now has a worsening wound w/ surrounding redness. On exam, Wound to R great toe w/ erythema to proximal toe. Dusky appearance to tip of toe. No drainage noted. Superficial wound to dorsum of L foot w/ surrounding erythema.  Faint pulse on " doppler bilaterally. Abnormal Labs Ca 8/0, Total protein 6.2. Xray R foot: Irregularity of the soft tissues of the great toe as well as a small focus of lucency in the distal aspect of the first distal phalanx suspicious for osteomyelitis. MRI may be performed for further evaluation as clinically warranted. In the ED, pt received IV Cefepime.     Plan: Continue IV Cefepime. Start IV Vanco. Vascular work up w/ leads. Continue home meds. Start ASA and Plavix. Lovenox SQ.   Podiatry and Vascular consulted.    Anticipated Length of Stay/Certification Statement:  Patient will be admitted on an inpatient basis with an anticipated length of stay of greater than 2 midnights secondary to wounds.     Date: 10/23/24   Day 2: Denies any complaints today. On exam,  No easily audible AT or DP pulses of the RLE. Right great toe with edema and erythema. Abnormal labs: Ca 7.9, total protein 5.6, H/H 11.1, 34.9.Plan: Continue IV Cefepime and IV Vanco. Continue ASA and Statin. HEATHER duplex pending. IR consult for RLE angiogram. Lovenox SQ. Local wound care per podiatry. CMP and CBC in am.     Vascular: R great toe diabetic ulcer. R hallux ulceration w/ OM and underling PAD. Chronic limb threatening ischemia. Plan: check HEATHER duplex study. Consult IR for RLE angiogram. Continue w/ antiplatelets and Atorvastatin.     Podiatry: Osteomyelitis R distal phalanx. PAD. May need partial toe amputation  Plan: Continue IV Vanco and Cefepime. LEADS pending.  Local wound care of Betadine w/ DSD, change every 40hrs. WBAT w/ postop shoe.     Certification Statement: The patient will continue to require additional inpatient hospital stay due to osteomyelitis, PAD                 Date: 10/24/24  Day 3: Has surpassed a 2nd midnight with active treatments and services.  Pt resting in bed, denies complaints. On exam, No easily audible DP pulses of bilateral lower extremity. Abnormal labs: Ca 7.9, H/H 11.2/35.8. Wound culture preliminary without growth.  Plan: D/C Cefepime, Continue IV Vanco. WBAT in post op show. Local wound care per podiatry. Continue ASA and Statin. HEATHER duplex pending. IR Angiogram RLE however IR not avail in [facility] until 10/28, will try to coordinate transfer-vs-boomerang at [place] to expedite care.     Certification Statement: The patient will continue to require additional inpatient hospital stay due to osteomyelitis, PAD.    ED Treatment-Medication Administration from 10/22/2024 1429 to 10/22/2024 1711         Date/Time Order Dose Route Action     10/22/2024 1606 cefepime (MAXIPIME) IVPB (premix in dextrose) 2,000 mg 50 mL 2,000 mg Intravenous New Bag            Scheduled Medications:  aspirin, 81 mg, Oral, Daily  atorvastatin, 80 mg, Oral, Daily With Dinner  clopidogrel, 75 mg, Oral, Daily  enoxaparin, 1 mg/kg, Subcutaneous, Q12H DEWAYNE  ezetimibe, 10 mg, Oral, Daily  furosemide, 20 mg, Oral, Daily  gabapentin, 600 mg, Oral, BID  insulin lispro, 1-5 Units, Subcutaneous, TID AC  losartan, 12.5 mg, Oral, Daily  metoprolol succinate, 25 mg, Oral, Daily  potassium chloride, 20 mEq, Oral, Daily  predniSONE, 5 mg, Oral, BID With Meals  vancomycin, 10 mg/kg, Intravenous, Q12H  cefepime (MAXIPIME) IVPB (premix in dextrose) 2,000 mg 50 mL  Dose: 2,000 mg  Freq: Every 12 hours Route: IV  Last Dose: 2,000 mg (10/24/24 0542)  Start: 10/23/24 1800 End: 10/24/24 0703    Continuous IV Infusions:     PRN Meds:  acetaminophen, 650 mg, Oral, Q6H PRN - given x1 10/22, x2 10/23  oxyCODONE, 5 mg, Oral, Q4H PRN - given x1 10/23, x1 10/24      ED Triage Vitals   Temperature Pulse Respirations Blood Pressure SpO2 Pain Score   10/22/24 1433 10/22/24 1433 10/22/24 1433 10/22/24 1433 10/22/24 1433 10/22/24 1925   98.1 °F (36.7 °C) 87 16 140/69 95 % 4     Weight (last 2 days)       Date/Time Weight    10/24/24 0600 98.5 (217.13)    10/23/24 0544 98.4 (217)    10/22/24 1713 97.2 (214.2)            Vital Signs (last 3 days)       Date/Time Temp Pulse  Resp BP MAP (mmHg) SpO2 O2 Device Patient Position - Orthostatic VS Pain    10/24/24 0824 97.6 °F (36.4 °C) 85 20 117/83 -- 96 % None (Room air) Lying --    10/24/24 0001 97.9 °F (36.6 °C) 78 20 149/77 102 94 % None (Room air) Lying --    10/23/24 2328 -- -- -- -- -- -- -- -- 7    10/23/24 2246 -- -- -- -- -- -- -- -- 4    10/23/24 2100 -- -- -- -- -- -- -- -- No Pain    10/23/24 1509 99.2 °F (37.3 °C) 89 20 147/82 -- 96 % -- Lying --    10/23/24 1028 -- -- -- -- -- -- -- -- 6    10/23/24 0935 -- -- -- -- -- -- -- -- 6    10/23/24 0755 99.2 °F (37.3 °C) 87 20 135/62 -- 96 % None (Room air) Sitting --    10/23/24 0017 98 °F (36.7 °C) 73 18 106/69 85 95 % None (Room air) Lying --    10/22/24 2304 -- -- -- -- -- -- -- -- 6    10/22/24 1925 -- -- -- -- -- -- -- -- 4    10/22/24 1713 98.1 °F (36.7 °C) 80 18 142/73 -- 96 % -- Lying --    10/22/24 1433 98.1 °F (36.7 °C) 87 16 140/69 -- 95 % None (Room air) -- --              Pertinent Labs/Diagnostic Test Results:   Radiology:  XR toe great min 2 view RIGHT   Final Interpretation by Jay Dodd MD (10/22 2124)      Irregularity of the soft tissues of the great toe as well as a small focus of lucency in the distal aspect of the first distal phalanx suspicious for osteomyelitis. MRI may be performed for further evaluation as clinically warranted.         Computerized Assisted Algorithm (CAA) may have been used to analyze all applicable images.            Workstation performed: [id number]         VAS ARTERIAL DUPLEX- LOWER LIMB BILATERAL     CONCLUSION:  Impression:  RIGHT LOWER LIMB:  High grade stenosis versus occlusion in the proximal, mid and distal  superficial femoral artery with reconstitution distally.  Ankle/Brachial index:   0.39 (Ischemic Range)  Prior 0.58  PVR/ PPG tracings are obliterated.  Metatarsal pressure could not be recorded due to obliterated wave-form.  Great toe pressure could not be recorded due to obliterated wave-form.  Compared to previous  study on 07/26/23 , there is high grade stenosis versus  occlusion in the proximal, mid and distal superficial femoral artery with  reconstitution distally.     LEFT LOWER LIMB:  High grade stenosis versus occlusion in the proximal and mid superficial  femoral artery with reconstitution distally.  Ankle/Brachial index:   Non-compressible.  Prior Non-compressible.  PVR/ PPG tracings are obliterated.  PVR/ PPG tracings are obliterated.  Metatarsal pressure could not be recorded due to obliterated wave-form.  Compared to previous study on 07/26/23, high grade stenosis versus occlusion in  the proximal and mid superficial femoral artery with reconstitution distally.           Results from last 7 days   Lab Units 10/24/24  0527 10/23/24  0543 10/22/24  1536   WBC Thousand/uL 6.09 7.87 9.52   HEMOGLOBIN g/dL 11.2* 11.1* 12.1   HEMATOCRIT % 35.8* 34.9* 37.8   PLATELETS Thousands/uL 155 159 169   BANDS PCT %  --   --  3         Results from last 7 days   Lab Units 10/24/24  0527 10/23/24  0543 10/22/24  1536   SODIUM mmol/L 139 140 141   POTASSIUM mmol/L 3.9 3.6 4.0   CHLORIDE mmol/L 109* 110* 110*   CO2 mmol/L 23 22 23   ANION GAP mmol/L 7 8 8   BUN mg/dL 18 25 22   CREATININE mg/dL 0.74 0.74 0.86   EGFR ml/min/1.73sq m 88 88 83   CALCIUM mg/dL 7.9* 7.9* 8.0*     Results from last 7 days   Lab Units 10/23/24  0543 10/22/24  1536   AST U/L 11* 12*   ALT U/L 18 20   ALK PHOS U/L 58 69   TOTAL PROTEIN g/dL 5.6* 6.2*   ALBUMIN g/dL 3.6 3.9   TOTAL BILIRUBIN mg/dL 0.80 0.54     Results from last 7 days   Lab Units 10/24/24  0705 10/23/24  2036 10/23/24  1600 10/23/24  1056 10/23/24  0718 10/22/24  2032 10/22/24  1746   POC GLUCOSE mg/dl 80 104 92 97 94 99 85     Results from last 7 days   Lab Units 10/24/24  0527 10/23/24  0543 10/22/24  1536   GLUCOSE RANDOM mg/dL 73 85 81         Results from last 7 days   Lab Units 10/23/24  0543   HEMOGLOBIN A1C % 5.5   EAG mg/dl 111     Results from last 7 days   Lab Units 10/22/24  1536    PROCALCITONIN ng/ml 0.07     Results from last 7 days   Lab Units 10/23/24  0543   CRP mg/L 2.4   SED RATE mm/hour 7             Results from last 7 days   Lab Units 10/23/24  0931   GRAM STAIN RESULT  No Polys or Bacteria seen     Past Medical History:   Diagnosis Date    Cancer ([facility]) 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    Coronary artery disease 2001    S/p stenting to circumflex and RCA    HFrEF (heart failure with reduced ejection fraction) ([facility]) 06/14/2021    High cholesterol     Prostate cancer ([facility])      Present on Admission:   Moderate vascular dementia ([facility])   PAD (peripheral artery disease) ([facility])   CAD (coronary artery disease)   Chronic combined systolic and diastolic CHF (congestive heart failure) ([facility])   Malignant neoplasm metastatic to bone ([facility])      Admitting Diagnosis: Wound infection [T14.8XXA, L08.9]  PAD (peripheral artery disease) ([facility]) [I73.9]  Wound of foot [S91.309A]  Age/Sex: 78 y.o. male    Network Utilization Review Department  ATTENTION: Please call with any questions or concerns to [phone] and carefully listen to the prompts so that you are directed to the right person. All voicemails are confidential.   For Discharge needs, contact Care Management DC Support Team at [phone] opt. [phone]  Send all requests for admission clinical reviews, approved or denied determinations and any other requests to dedicated fax number below belonging to the campus where the patient is receiving treatment. List of dedicated fax numbers for the Facilities:  FACILITY NAME UR FAX NUMBER   ADMISSION DENIALS (Administrative/Medical Necessity) [phone]   DISCHARGE SUPPORT TEAM (NETWORK) [phone]   PARENT CHILD HEALTH (Maternity/NICU/Pediatrics) [phone]   [facility] [phone]   General acute hospital [phone]   [facility] [phone]   [facility] [phone]   [facility]  [phone]   [facility] [phone]   [facility] [phone]   [facility] [phone]   [facility] [phone]   [facility] [phone]   [facility] [phone]   [facility] [phone]

## 2024-10-23 NOTE — ASSESSMENT & PLAN NOTE
Wt Readings from Last 3 Encounters:   10/23/24 98.4 kg (217 lb)   10/09/24 100 kg (221 lb 5.5 oz)   09/25/24 99.8 kg (220 lb)     Currently euvolemic on exam  Echo (8/2/23): The left ventricular ejection fraction is 30-35%. Systolic function is severely reduced. There is mid anteroseptal, mid inferoseptal, apical septal, apical lateral, apical inferior, apical anterior and apical akinesis with wall thinning, suggestive of aneurysmal changes. Diastolic function is mildly abnormal, consistent with grade I (abnormal) relaxation.   Continue Lasix 20mg daily  Cardiac diet, daily weights, I&Os  Outpatient Cardiology follow up

## 2024-10-23 NOTE — ASSESSMENT & PLAN NOTE
Patient with chronic lower extremity wounds with poor wound healing. Reports worsening appearance of the right great toe since injuring it recently by hitting it against a refrigerator.   XR R foot: Irregularity of the soft tissues of the great toe as well as a small focus of lucency in the distal aspect of the first distal phalanx suspicious for osteomyelitis   Does not meet sepsis criteria on admission  Vascular surgery consulted. HEATHER duplex pending. Plan for IR angiogram of the RLE. IR unavailable at [facility] until 10/28, will try to coordinate transfer vs boomerang at [place] to expedite care  Podiatry consulted. Planning for partial amputation of the right hallux distal phalanx post vascular intervention  Continue Cefepime and vancomycin. De-escalate antibiotics per wound culture results  WBAT in post-op shoe. Wound care per nursing

## 2024-10-23 NOTE — PLAN OF CARE
Problem: PAIN - ADULT  Goal: Verbalizes/displays adequate comfort level or baseline comfort level  Description: Interventions:  - Encourage patient to monitor pain and request assistance  - Assess pain using appropriate pain scale  - Administer analgesics based on type and severity of pain and evaluate response  - Implement non-pharmacological measures as appropriate and evaluate response  - Consider cultural and social influences on pain and pain management  - Notify physician/advanced practitioner if interventions unsuccessful or patient reports new pain  Outcome: Progressing     Problem: INFECTION - ADULT  Goal: Absence or prevention of progression during hospitalization  Description: INTERVENTIONS:  - Assess and monitor for signs and symptoms of infection  - Monitor lab/diagnostic results  - Monitor all insertion sites, i.e. indwelling lines, tubes, and drains  - Monitor endotracheal if appropriate and nasal secretions for changes in amount and color  - [place] appropriate cooling/warming therapies per order  - Administer medications as ordered  - Instruct and encourage patient and family to use good hand hygiene technique  - Identify and instruct in appropriate isolation precautions for identified infection/condition  Outcome: Progressing  Goal: Absence of fever/infection during neutropenic period  Description: INTERVENTIONS:  - Monitor WBC    Outcome: Progressing     Problem: SAFETY ADULT  Goal: Patient will remain free of falls  Description: INTERVENTIONS:  - Educate patient/family on patient safety including physical limitations  - Instruct patient to call for assistance with activity   - Consult OT/PT to assist with strengthening/mobility   - Keep Call bell within reach  - Keep bed low and locked with side rails adjusted as appropriate  - Keep care items and personal belongings within reach  - Initiate and maintain comfort rounds  - Make Fall Risk Sign visible to staff  - Offer Toileting every  Hours,  in advance of need  - Initiate/Maintain alarm  - Obtain necessary fall risk management equipment:   - Apply yellow socks and bracelet for high fall risk patients  - Consider moving patient to room near nurses station  Outcome: Progressing  Goal: Maintain or return to baseline ADL function  Description: INTERVENTIONS:  -  Assess patient's ability to carry out ADLs; assess patient's baseline for ADL function and identify physical deficits which impact ability to perform ADLs (bathing, care of mouth/teeth, toileting, grooming, dressing, etc.)  - Assess/evaluate cause of self-care deficits   - Assess range of motion  - Assess patient's mobility; develop plan if impaired  - Assess patient's need for assistive devices and provide as appropriate  - Encourage maximum independence but intervene and supervise when necessary  - Involve family in performance of ADLs  - Assess for home care needs following discharge   - Consider OT consult to assist with ADL evaluation and planning for discharge  - Provide patient education as appropriate  Outcome: Progressing  Goal: Maintains/Returns to pre admission functional level  Description: INTERVENTIONS:  - Perform AM-PAC 6 Click Basic Mobility/ Daily Activity assessment daily.  - Set and communicate daily mobility goal to care team and patient/family/caregiver.   - Collaborate with rehabilitation services on mobility goals if consulted  - Perform Range of Motion  times a day.  - Reposition patient every  hours.  - Dangle patient  times a day  - Stand patient  times a day  - Ambulate patient  times a day  - Out of bed to chair  times a day   - Out of bed for meals  times a day  - Out of bed for toileting  - Record patient progress and toleration of activity level   Outcome: Progressing     Problem: DISCHARGE PLANNING  Goal: Discharge to home or other facility with appropriate resources  Description: INTERVENTIONS:  - Identify barriers to discharge w/patient and caregiver  - Arrange for  needed discharge resources and transportation as appropriate  - Identify discharge learning needs (meds, wound care, etc.)  - Arrange for interpretive services to assist at discharge as needed  - Refer to Case Management Department for coordinating discharge planning if the patient needs post-hospital services based on physician/advanced practitioner order or complex needs related to functional status, cognitive ability, or social support system  Outcome: Progressing

## 2024-10-23 NOTE — PROGRESS NOTES
Progress Note - Hospitalist   Name: Royce Rene 78 y.o. male I MRN: [id number]  Unit/Bed#: -01 I Date of Admission: 10/22/2024   Date of Service: 10/23/2024 I Hospital Day: 1    Assessment & Plan  Osteomyelitis ([facility])  Patient with chronic lower extremity wounds with poor wound healing. Reports worsening appearance of the right great toe since injuring it recently by hitting it against a refrigerator.   XR R foot: Irregularity of the soft tissues of the great toe as well as a small focus of lucency in the distal aspect of the first distal phalanx suspicious for osteomyelitis   Does not meet sepsis criteria on admission  Vascular surgery consulted. HEATHER duplex pending. Plan for IR angiogram of the RLE. IR unavailable at [facility] until 10/28, will try to coordinate transfer vs boomerang at [place] to expedite care  Podiatry consulted. Planning for partial amputation of the right hallux distal phalanx post vascular intervention  Continue Cefepime and vancomycin. De-escalate antibiotics per wound culture results  WBAT in post-op shoe. Wound care per nursing  Chronic combined systolic and diastolic CHF (congestive heart failure) ([facility])  Wt Readings from Last 3 Encounters:   10/23/24 98.4 kg (217 lb)   10/09/24 100 kg (221 lb 5.5 oz)   09/25/24 99.8 kg (220 lb)     Currently euvolemic on exam  Echo (8/2/23): The left ventricular ejection fraction is 30-35%. Systolic function is severely reduced. There is mid anteroseptal, mid inferoseptal, apical septal, apical lateral, apical inferior, apical anterior and apical akinesis with wall thinning, suggestive of aneurysmal changes. Diastolic function is mildly abnormal, consistent with grade I (abnormal) relaxation.   Continue Lasix 20mg daily  Cardiac diet, daily weights, I&Os  Outpatient Cardiology follow up  CAD (coronary artery disease)  Denies any chest pain  Continue ASA, statin, BB therapy  PAD (peripheral artery disease) ([facility])  History of PAD and s/p  stenting  HEATHER duplex studying pending  Continue ASA and statin therapy  IR consulted for RLE angiogram +/- intervention  Vascular surgery consulted, recommendations are appreciated  Moderate vascular dementia (HCC)  Patient with history of vascular dementia. 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 Hernandez for Oncology outpatient     VTE Pharmacologic Prophylaxis:   Moderate Risk (Score 3-4) - Pharmacological DVT Prophylaxis Ordered: enoxaparin (Lovenox).    Mobility:   Basic Mobility Inpatient Raw Score: 16  JH-HLM Goal: 5: Stand one or more mins  JH-HLM Achieved: 6: Walk 10 steps or more  JH-HLM Goal achieved. Continue to encourage appropriate mobility.    Patient Centered Rounds: I performed bedside rounds with nursing staff today.   Discussions with Specialists or Other Care Team Provider: Nursing, Vascular, IR, Podiatry    Education and Discussions with Family / Patient:  Updated son Rodrigue via phone, left voicemail.     Current Length of Stay: 1 day(s)  Current Patient Status: Inpatient   Certification Statement: The patient will continue to require additional inpatient hospital stay due to osteomyelitis, PAD  Discharge Plan: Anticipate discharge in >72 hrs to TBD    Code Status: Level 1 - Full Code    Subjective   Patient lying in bed under no acute distress. Denies any fevers/chills. Reports R great toe pain but not any worse than over the last few days.     Objective :  Temp:  [98 °F (36.7 °C)-99.2 °F (37.3 °C)] 99.2 °F (37.3 °C)  HR:  [73-87] 87  BP: (106-142)/(62-73) 135/62  Resp:  [16-20] 20  SpO2:  [95 %-96 %] 96 %  O2 Device: None (Room air)    Body mass index is 33.99 kg/m².     Input and Output Summary (last 24 hours):     Intake/Output Summary (Last 24 hours) at  10/23/2024 1341  Last data filed at 10/23/2024 0550  Gross per 24 hour   Intake 473 ml   Output 475 ml   Net -2 ml       Physical Exam  Vitals and nursing note reviewed.   Constitutional:       General: He is not in acute distress.     Appearance: He is well-developed. He is obese.   HENT:      Head: Normocephalic and atraumatic.   Eyes:      Conjunctiva/sclera: Conjunctivae normal.   Cardiovascular:      Rate and Rhythm: Normal rate and regular rhythm.      Heart sounds: No murmur heard.  Pulmonary:      Effort: Pulmonary effort is normal. No respiratory distress.      Breath sounds: Normal breath sounds.   Abdominal:      Palpations: Abdomen is soft.      Tenderness: There is no abdominal tenderness.   Musculoskeletal:         General: No swelling.      Cervical back: Neck supple.   Feet:      Comments: No easily audible AT or DP pulses of the RLE.   Right great toe with edema and erythema no overt cellulitis. Deformity of the toenail. See image below  Skin:     General: Skin is warm and dry.      Capillary Refill: Capillary refill takes less than 2 seconds.   Neurological:      Mental Status: He is alert. Mental status is at baseline.   Psychiatric:         Mood and Affect: Mood normal.           Lines/Drains:      Central Line:  Goal for removal:  Chronic for chemotherapy               Lab Results: I have reviewed the following results:   Results from last 7 days   Lab Units 10/23/24  0543 10/22/24  1536   WBC Thousand/uL 7.87 9.52   HEMOGLOBIN g/dL 11.1* 12.1   HEMATOCRIT % 34.9* 37.8   PLATELETS Thousands/uL 159 169   BANDS PCT %  --  3   LYMPHO PCT %  --  9*   MONO PCT %  --  6   EOS PCT %  --  1     Results from last 7 days   Lab Units 10/23/24  0543   SODIUM mmol/L 140   POTASSIUM mmol/L 3.6   CHLORIDE mmol/L 110*   CO2 mmol/L 22   BUN mg/dL 25   CREATININE mg/dL 0.74   ANION GAP mmol/L 8   CALCIUM mg/dL 7.9*   ALBUMIN g/dL 3.6   TOTAL BILIRUBIN mg/dL 0.80   ALK PHOS U/L 58   ALT U/L 18   AST U/L 11*    GLUCOSE RANDOM mg/dL 85         Results from last 7 days   Lab Units 10/23/24  1056 10/23/24  0718 10/22/24  2032 10/22/24  1746   POC GLUCOSE mg/dl 97 94 99 85     Results from last 7 days   Lab Units 10/23/24  0543   HEMOGLOBIN A1C % 5.5     Results from last 7 days   Lab Units 10/22/24  1536   PROCALCITONIN ng/ml 0.07       Recent Cultures (last 7 days):         Imaging Results Review: I reviewed radiology reports from this admission including: xray(s).  Other Study Results Review: No additional pertinent studies reviewed.    Last 24 Hours Medication List:     Current Facility-Administered Medications:     acetaminophen (TYLENOL) tablet 650 mg, Q6H PRN    aspirin chewable tablet 81 mg, Daily    atorvastatin (LIPITOR) tablet 80 mg, Daily With Dinner    cefepime (MAXIPIME) IVPB (premix in dextrose) 2,000 mg 50 mL, Q12H    clopidogrel (PLAVIX) tablet 75 mg, Daily    [START ON 10/24/2024] enoxaparin (LOVENOX) subcutaneous injection 40 mg, Q24H DEWAYNE    ezetimibe (ZETIA) tablet 10 mg, Daily    furosemide (LASIX) tablet 20 mg, Daily    gabapentin (NEURONTIN) capsule 600 mg, BID    insulin lispro (HumALOG/ADMELOG) 100 units/mL subcutaneous injection 1-5 Units, TID AC **AND** Fingerstick Glucose (POCT), TID AC    losartan (COZAAR) tablet 12.5 mg, Daily    metoprolol succinate (TOPROL-XL) 24 hr tablet 25 mg, Daily    oxyCODONE (ROXICODONE) IR tablet 5 mg, Q4H PRN    potassium chloride (Klor-Con M20) CR tablet 20 mEq, Daily    predniSONE tablet 5 mg, BID With Meals    vancomycin (VANCOCIN) IVPB (premix in dextrose) 1,000 mg 200 mL, Q12H, Last Rate: 1,000 mg (10/23/24 0851)    Administrative Statements   Today, Patient Was Seen By: Asuncion Carver PA-C  I have spent a total time of 40 minutes in caring for this patient on the day of the visit/encounter including Diagnostic results, Prognosis, Instructions for management, Patient and family education, Importance of tx compliance, Counseling / Coordination of care, Documenting  in the medical record, Reviewing / ordering tests, medicine, procedures  , Obtaining or reviewing history  , and Communicating with other healthcare professionals .    **Please Note: This note may have been constructed using a voice recognition system.**

## 2024-10-23 NOTE — ASSESSMENT & PLAN NOTE
Patient with metastatic castration resistant prostate cancer  Currently on chemotherapy with Jevtana every 3 weeks  Follows with Dr. Hernandez for Oncology outpatient

## 2024-10-23 NOTE — OCCUPATIONAL THERAPY NOTE
Occupational Therapy Evaluation & Treat     Patient Name: Royce Rene  Today's Date: 10/23/2024  Problem List  Principal Problem:    Wound of foot  Active Problems:    Chronic combined systolic and diastolic CHF (congestive heart failure) ([facility])    CAD (coronary artery disease)    PAD (peripheral artery disease) ([facility])    Moderate vascular dementia ([facility])    Past Medical History  Past Medical History:   Diagnosis Date    Cancer ([facility]) 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    Coronary artery disease 2001    S/p stenting to circumflex and RCA    HFrEF (heart failure with reduced ejection fraction) ([facility]) 06/14/2021    High cholesterol     Prostate cancer ([facility])      Past Surgical History  Past Surgical History:   Procedure Laterality Date    CARDIAC ELECTROPHYSIOLOGY PROCEDURE N/A 12/23/2021    Procedure: Implant ICD - bi ventricular;  Surgeon: Jonas Oviedo MD;  Location: [facility];  Service: Cardiology    CARDIAC SURGERY      IR LOWER EXTREMITY ANGIOGRAM  4/18/2023    TN TEAEC W/WO PATCH GRAFT COMMON FEMORAL Right 7/9/2021    Procedure: ENDARTERECTOMY ARTERIAL FEMORAL;  Surgeon: Serina Cook DO;  Location: [facility];  Service: Vascular         10/23/24 1050   OT Last Visit   OT Visit Date 10/23/24   Note Type   Note type Evaluation  (& Treat)   Pain Assessment   Pain Assessment Tool Ozuna-Baker FACES   Ozuna-Baker FACES Pain Rating 6   Pain Location/Orientation Orientation: Right;Location: Foot  (ankle)   Restrictions/Precautions   Weight Bearing Precautions Per Order Yes   RLE Weight Bearing Per Order WBAT  (in surgical shoe)   Home Living   Type of Home House  (2nd floor of home)   Home Layout One level  (0 MODESTA, with stair glide to 2nd floor)   Bathroom Shower/Tub Walk-in shower   Bathroom Toilet Raised   Bathroom Equipment Toilet raiser;Shower chair   Bathroom Accessibility Accessible   Home Equipment Walker  (rollator, scooter)   Prior Function   Level of [place] Independent with ADLs;Independent with  "functional mobility;Needs assistance with IADLS   Lives With Son  (Mostly resides on attic level)   Receives Help From Family   IADLs Family/Friend/Other provides transportation;Family/Friend/Other provides meals;Independent with medication management  (Relies on [facility] transport to go to chemo appts. Reports that all of his meds are pre-sorted in a single packet and he takes them in the morning. Reports that he may occasionally miss doses 2/2 \"not feeling well and not being able to get out of bed\". Pt verbalized understanding of importance of medication compliance. Pt currently undergoing home OT to address home safety/medication mgmt )   Falls in the last 6 months 1 to 4   Vocational Retired   General   Additional General Comments Pt was undergoing home OT as of 10/17/24, and set to soon be D/C from services.   Subjective   Subjective \"I won't know how much it hurts until I put my foot down\"   ADL   Eating Assistance 7  Independent   Grooming Assistance 7  Independent   UB Bathing Assistance 5  Supervision/Setup   LB Bathing Assistance 4  Minimal Assistance   UB Dressing Assistance 5  Supervision/Setup   LB Dressing Assistance 3  Moderate Assistance   Toileting Assistance  4  Minimal Assistance   Bed Mobility   Supine to Sit 6  Modified independent   Additional Comments Pt remained seated in recliner by end of session.   Transfers   Sit to Stand 5  Supervision   Additional items Increased time required;Verbal cues   Stand to Sit 5  Supervision   Additional items Verbal cues;Increased time required   Toilet transfer 3  Moderate assistance   Additional items Assist x 1;Verbal cues;Standard toilet  (Improved to min Ax 1 with subsequent attempts. Pt has a toilet riser on toilet at home)   Additional Comments Benefited from VC for appropriate hand placement and body positioning to safely perform transfers.   Balance   Static Sitting Good   Dynamic Sitting Fair +   Static Standing Fair -   Dynamic Standing Fair - "   Activity Tolerance   Activity Tolerance Patient limited by pain   Medical Staff Made Aware PT [place]   Nurse Made Aware RN Anastasiia ECHEVERRIA Assessment   RUE Assessment WFL   LUE Assessment   LUE Assessment WFL   Cognition   Arousal/Participation Alert   Attention Difficulty dividing attention   Orientation Level Oriented X4   Memory Decreased short term memory   Following Commands Follows multistep commands with increased time or repetition   Comments ACLs performed in Jan 2024. Pt scored 4.4, indicating that there is benefit of daily check-ins, however may be alone for parts of day.   Assessment   Limitation Decreased ADL status;Decreased self-care trans;Decreased high-level ADLs;Decreased cognition;Decreased endurance;Decreased Safe judgement during ADL   Prognosis Good   Assessment Pt is a 78 y.o. male seen for OT evaluation at [facility], admitted 10/22/2024 w/ Wound of foot.  Comorbidities affecting pt's functional performance at time of assessment include: prostate cancer undergoing treatment with hematology oncology, CAD status post stenting, heart failure with reduced ejection fraction, peripheral arterial disease, vascular dementia .  Prior to admission, pt was living in a 2nd floor of home, with son residing on 3rd floor.  Pt was I w/  ADLS and required some assist with IADLS, & required rollator and scooter  PTA. Upon evaluation, pt appears to be performing below baseline functional status. Pt currently requires mod I for bed mobility, sup-min A for functional mobility/transfers, sup for UB ADLs and min-mod Afor LB ADLS 2* the following deficits impacting occupational performance: weakness, decreased strength, decreased balance, decreased tolerance, impaired memory, decreased safety awareness, and increased pain. Full objective findings from OT assessment regarding body systems outlined above. Personal factors affecting pt at time of IE include:steps to enter environment, difficulty performing  ADLS, difficulty performing IADLS , and decreased functional mobility . These impairments, as well as decreased caregiver support and risk for falls  limit pt's ability to safely engage in all baseline areas of occupation and mobility. Pt to benefit from continued skilled OT tx while in the hospital to address deficits as defined above and maximize level of functional independence w ADL's and functional mobility. Occupational Performance areas to address include: bathing/shower, toilet hygiene, dressing, medication management, and functional mobility.  This evaluation required an extensive review of medical and/or therapy records and additional review of physical, cognitive and psychosocial history related to functional performance. Based upon functional performance deficits and assessments, this evaluation has been identified as a high complexity evaluation.  At this time, OT recommendations at time of discharge are level 3 low intensity resources. Pt with ongoing podiatry/vascular f/u. If change in WB/functional status, OT to re-evaluate as needed.   Goals   Patient Goals Pt wishes for foot pain to improve   Plan   Treatment Interventions ADL retraining;Functional transfer training;Cognitive reorientation;Equipment evaluation/education;Compensatory technique education   Goal Expiration Date 11/02/24   OT Treatment Day 0   OT Frequency 2-3x/wk   Discharge Recommendation   Rehab Resource Intensity Level, OT III (Minimum Resource Intensity)  (Pt with ongoing podiatry/vascular f/u. If change in WB/functional status, OT to re-evaluate as needed.)   Additional Comments  The patient's raw score on the -PAC Daily Activity inpatient short form is 21, standardized score is 44.27, greater than 39.4. Patients at this level are likely to benefit from DC to home. However please refer to therapist recommendation for discharge planning given other factors that may influence destination.   -PAC Daily Activity Inpatient   Lower  Body Dressing 3   Bathing 3   Toileting 3   Upper Body Dressing 4   Grooming 4   Eating 4   Daily Activity Raw Score 21   Daily Activity Standardized Score (Calc for Raw Score >=11) 44.27   AM-PAC Applied Cognition Inpatient   Following a Speech/Presentation 3   Understanding Ordinary Conversation 4   Taking Medications 3   Remembering Where Things Are Placed or Put Away 4   Remembering List of 4-5 Errands 4   Taking Care of Complicated Tasks 3   Applied Cognition Raw Score 21   Applied Cognition Standardized Score 44.3   End of Consult   Education Provided Yes   Patient Position at End of Consult Bedside chair;Bed/Chair alarm activated;All needs within reach   Nurse Communication Nurse aware of consult        Treatment Note: 10:40-10:50  Pt seated EOB. Performed sit>stand from bed with supervision with VC for hand placement. Performed functional mobility into bathroom with supervision. Performed initial toilet transfer with mod A. Required VC for proper positioning and hand placement for safe descent. Pt able to perform subsequent transfers with min Ax 1. Pt verbalized having higher toilet at home to maximize independence. Pt returned to bedside chair with supervision.       Pt will achieve the following goals within 10 days.    *Pt will complete UB bathing and dressing with mod I.    *Pt will complete LB bathing and dressing with mod I .    * Pt will complete toileting w/ mod I w/ G hygiene/thoroughness using DME PRN    *Pt will complete bed mobility with mod I, with bed flat and no side rail to prep for purposeful tasks    *Pt will perform functional transfers with on/off all surfaces with mod I using DME as needed w/ G balance/safety.    * Pt will engage in ongoing cognitive assessment w/ G participation to A w/ safe d/c planning/recommendations    *Patient will verbalize 3 safety awareness/ principles to prevent falls in the home setting.     *Pt will improve functional mobility during  ADL/IADL/leisure tasks to mod I using DME as needed w/ G balance/safety.     *Assess DME needs       Evelia Davis, OTR/L

## 2024-10-23 NOTE — PROGRESS NOTES
Royce Rene is a 78 y.o. male who is currently ordered Vancomycin IV with management by the Pharmacy Consult service.  Relevant clinical data and objective / subjective history reviewed.  Vancomycin Assessment:  Indication and Goal AUC/Trough: Soft tissue (goal -600, trough >10)  Clinical Status:  New  Micro:   None   Renal Function:  SCr: 0.86 mg/dL  CrCl: 79.6 mL/min  Renal replacement: Not on dialysis  Days of Therapy: 2  Current Dose: 1500 mg IV q12h  Vancomycin Plan:  New Dosin mg IV q12h  Estimated AUC: 521 mcg*hr/mL  Estimated Trough: 16 mcg/mL  Next Level: 10/24/24 with AM labs  Renal Function Monitoring: Daily BMP and UOP  Pharmacy will continue to follow closely for s/sx of nephrotoxicity, infusion reactions and appropriateness of therapy.  BMP and CBC will be ordered per protocol. We will continue to follow the patient’s culture results and clinical progress daily.    Clementina Benitez, Pharmacist

## 2024-10-23 NOTE — PLAN OF CARE
Problem: PHYSICAL THERAPY ADULT  Goal: Performs mobility at highest level of function for planned discharge setting.  See evaluation for individualized goals.  Description: Treatment/Interventions: Functional transfer training, LE strengthening/ROM, Elevations, Therapeutic exercise, Endurance training, Patient/family training, Equipment eval/education, Bed mobility, Gait training, Compensatory technique education          See flowsheet documentation for full assessment, interventions and recommendations.  Outcome: Progressing  Note: Prognosis: Fair  Problem List: Decreased strength, Decreased endurance, Impaired balance, Decreased mobility, Impaired judgement, Decreased safety awareness, Decreased cognition, Pain, Orthopedic restrictions  Assessment: Orders for PT eval and treat received.  Pt's PMHx: chronic LBP, CAD with stent, Cx, dementia. Pt exhibits physical deficits noted in problem list above.  Deficits listed contribute to functional limitations that are significant from the patient PLOF and include: difficulty with bed mobility, impaired standing balance, inability to perform safe transfers, tolerance for OOB functional activities, unsafe/inefficient ambulation, fall risk, and unable to safely manage stairs/steps/ramp    Additional considerations affecting pt's discharge planning: Insufficient or unavailable assistance at home, Unfavorable, dangerous, or deplorable home environment, Significant amount of stairs to manage in order to access living quarters, Progressive cognitive decline affecting safety awareness/insight, Inability to manage basic self care ADLs with the assistance that is currently available, Inability to perform necessary transfers and/or mobility out of bed, with the assistance that is currently available, and High risk for re-admission due to non-compliance with healthcare provider intervention/s    During today's session, formal PT evaluation performed.  Pt fitted for Post-op shoe and  educated on proper fit and utilization.  Pt seemed to struggle to manage bed mobility and transfers due to generalized weakness and body habitus.  Pt expressed multiple c/o pain throughout session while transferring and standing due to anahi feet with weight bearing.  Despite instruction and education, pt struggles to modify ambulation and walker use to maintain mobility while managing pain.  Pt exhibits instability and presents as an increased fall risk due to decreased safety awareness.  Despite pain, physical deficits, and cognition deficits he is able to manage basic transfers and mobility, but would likely need increased assistance to manage safely at home.  However, expect pt may need rehab facility placement if any changes in weight bearing restrictions or pt lacks appropriate assistance.      The AM-PAC Mobility Score was used to assist in determining pt safety w/ mobility/self care & appropriate d/c recommendations, see above for scores. Patient's clinical presentation is unstable/unpredictable due to significant acute change in functional independence, uncontrolled pain, abnormal lab values, significant need for care assistance compared to baseline, altered mental status, ongoing medical management needs, and complicated social/support system.  Barriers to Discharge: Decreased caregiver support, Inaccessible home environment     Rehab Resource Intensity Level, PT: III (Minimum Resource Intensity)    See flowsheet documentation for full assessment.

## 2024-10-23 NOTE — CONSULTS
Consult Note - Vascular Surgery   Royce Rene 78 y.o. male MRN: [id number]    Unit/Bed#: -01 Encounter: [id number]    Assessment:  Right hallux ulceration with OM and underlying PAD  Chronic limb threatening ischemia     Plan:  -Acute on chronic R foot ulceration with pain  -Hx R fem endart '21 and R SFA Stockwave with PORTIA and treatment of R popliteal and PT arteries with PTA '23.  -Exam: Partial hallux ulceration with OM.  Swelling and pinkness in the toe.  No overt cellulitis in the foot.  Large pannus. 1+ femoral pulses. No pulses in the feet. R poor PT signal.  No easily audible AT or DP signals in the foot  -Check HEATHER duplex study  -Would benefit from right lower extremity angiogram plus or minus intervention  -Will consult IR for RLE angiogram; coordinate procedure with [facility] if it is determined that the procedure cannot be performed at this [place] and would require transfer to another facility. .  -Podiatry consult for local foot care and management  -Continue with medical therapy antiplatelets and atorvastatin 80  -Currently on antibiotics with cefepime and vancomycin.   -Remainder of care as per internal medicine  -Case discussed with DALE Rodríguez  -Will review with Dr. Sosa    Requesting Service: [facility]    Reason for consultation:  R great toe diabetic ulcer with peripheral arterial disease    HPI: Royce Rene is a 78 y.o. male met prostate CA on treatment followed by oncology and palliative care, hypertension, hyperlipidemia, vascular dementia, CAD s/p cor stent, s/p TAVR, HFrEF, BiV ICD, PAD s/p R fem endart '21, s/p R LE intervention s/p R SFA 4/18/23 for right foot wound who is now admitted to [facility] with R foot wounds and hallux ulceration.    He was treated in 2023 for long right SFA occlusion with successful recanalization using Shockwave IVL and 6 mm PORTIA, focal AK pop treated with 5 mm IVL and PTA, and focal proximal R PT and diffuse distal disease treat wit 3mm and 2mm  "PTA.    Patient reports that wound healed in April, but he uses a scooter and bumped his foot a lot.  He did jam it against a refrigerator while riding his scooter which caused open wound.  Unclear how long it has been since he is walked, he admits to 1 month stating that he has had foot pain mostly in the toe which worsens if he puts any pressure on the foot so he has not been walking.  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    He has no fevers or chills.  No diabetes. Patient denies chest pain or shortness of breath.    It does not appear that he has been taking his medications.  He states that he lives alone.  He has a son who is states is \"never around.\"     In the hospital he is placed back on medical therapy with aspirin 81, clopidogrel 75  and atorvastatin 80.    He is also on antibiotics with cefepime and vancomycin for cellulitis.    He has a chemo treatment next Wednesday and is hoping to be discharged prior to that time so he can go for treatment.     Imaging:    HEATHER 10/23/24 - ordered    HEATHER 7/26/23  Operative History:  2023-04-18 Right Right lower extremity angiogram  Coronary stent  Right common femoral endarterectomy  TAVR     Risk Factors  The patient has history of Obesity, Hyperlipidemia, CAD and previous smoking  (quit >10yrs ago).     Clinical  Right Pressure:  112/ mm Hg, Left Pressure:  104/ mm Hg.     FINDINGS:     Right                  PSV (cm/s)    Common Femoral Artery         142    Prox Profunda                 114    Prox SFA - Stent              114    Mid SFA - Stent               129    Dist SFA - Stent              117    Proximal Pop                   44    Distal Pop                     46    Dist Post Tibial               48    Dist. Ant. Tibial              33             CONCLUSION:  Impression:  RIGHT LOWER LIMB:  Evaluation shows a patent superficial femoral artery S/P Stent " placement.  Ankle/Brachial index: 0.58 (Severe Range) Prior:  Non-compressible.  Metatarsal pressure of 63mmHg.  Prior Not recorded.  Great toe pressure of 29mmHg, within the healing range.  Prior Not recorded.  PVR/ PPG tracings are dampened.     LEFT LOWER LIMB:  Ankle/Brachial index: 1.56, (Unreliable). Prior: Non-compressible.  Metatarsal pressure of 84mmHg.  Prior 116mmHg.  Great toe pressure of 34 mmHg, within the healing range.  Prior 71mmHG.  PVR/ PPG tracings are dampened.     Compared to previous study on 04/05/23 , there is a patent right superficial  femoral artery and stent.      XR great toe R 10/22/24  Irregularity of the soft tissues of the great toe as well as a small focus of lucency in the distal aspect of the first distal phalanx suspicious for osteomyelitis. MRI may be performed for further evaluation as clinically warranted.     Review of Systems:  Review of systems as above.       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    Coronary artery disease 2001    S/p stenting to circumflex and RCA    HFrEF (heart failure with reduced ejection fraction) ([facility]) 06/14/2021    High cholesterol     Prostate cancer ([facility])        Past Surgical History:  Past Surgical History:   Procedure Laterality Date    CARDIAC ELECTROPHYSIOLOGY PROCEDURE N/A 12/23/2021    Procedure: Implant ICD - bi ventricular;  Surgeon: Jonas Oviedo MD;  Location: [facility] C[facility];  Service: Cardiology    CARDIAC SURGERY      IR LOWER EXTREMITY ANGIOGRAM  4/18/2023    IL TEAEC W/WO PATCH GRAFT COMMON FEMORAL Right 7/9/2021    Procedure: ENDARTERECTOMY ARTERIAL FEMORAL;  Surgeon: Serina Cook DO;  Location: [facility];  Service: Vascular       Social History:  Social History     Substance and Sexual Activity   Alcohol Use Not Currently     Social History     Substance and Sexual Activity   Drug Use Not Currently     Social History     Tobacco Use   Smoking Status  "Former    Current packs/day: 0.00    Types: Cigarettes    Start date: 1962    Quit date: 2002    Years since quittin.3    Passive exposure: Past   Smokeless Tobacco Never       Family History:  Family History   Problem Relation Age of Onset    Cancer Mother        Allergies:  No Known Allergies    Medications:    Current Facility-Administered Medications:     acetaminophen (TYLENOL) tablet 650 mg, Q6H PRN    aspirin chewable tablet 81 mg, Daily    atorvastatin (LIPITOR) tablet 80 mg, Daily With Dinner    clopidogrel (PLAVIX) tablet 75 mg, Daily    enoxaparin (LOVENOX) subcutaneous injection 100 mg, Q12H DEWAYNE    ezetimibe (ZETIA) tablet 10 mg, Daily    furosemide (LASIX) tablet 20 mg, Daily    gabapentin (NEURONTIN) capsule 600 mg, BID    insulin lispro (HumALOG/ADMELOG) 100 units/mL subcutaneous injection 1-5 Units, TID AC **AND** Fingerstick Glucose (POCT), TID AC    losartan (COZAAR) tablet 12.5 mg, Daily    metoprolol succinate (TOPROL-XL) 24 hr tablet 25 mg, Daily    oxyCODONE (ROXICODONE) IR tablet 5 mg, Q4H PRN    potassium chloride (Klor-Con M20) CR tablet 20 mEq, Daily    predniSONE tablet 5 mg, BID With Meals    vancomycin (VANCOCIN) 1500 mg in sodium chloride 0.9% 250 mL IVPB, Q12H    Vitals:  /69 (BP Location: Right arm)   Pulse 73   Temp 98 °F (36.7 °C) (Tympanic)   Resp 18   Ht 5' 7\" (1.702 m)   Wt 98.4 kg (217 lb)   SpO2 95%   BMI 33.99 kg/m²     I/Os:  I/O last 24 hours:  In: 473 [P.O.:473]  Out: 475 [Urine:475]    Lab Results and Cultures:   Lab Results   Component Value Date    WBC 7.87 10/23/2024    HGB 11.1 (L) 10/23/2024    HCT 34.9 (L) 10/23/2024    MCV 97 10/23/2024     10/23/2024     Lab Results   Component Value Date    CALCIUM 8.0 (L) 10/22/2024    K 4.0 10/22/2024    CO2 23 10/22/2024     (H) 10/22/2024    BUN 22 10/22/2024    CREATININE 0.86 10/22/2024     Lab Results   Component Value Date    INR 1.13 2023    INR 0.98 2021    INR 1.11 " "07/08/2021    PROTIME 14.9 (H) 04/03/2023    PROTIME 12.6 12/23/2021    PROTIME 14.3 07/08/2021       Lipid Panel: No results found for: \"CHOL\",     Blood Culture:   Lab Results   Component Value Date    BLOODCX No Growth After 5 Days. 04/03/2023   ,   Urinalysis:   Lab Results   Component Value Date    COLORU Yellow 01/18/2024    CLARITYU Clear 01/18/2024    SPECGRAV 1.020 01/18/2024    PHUR 5.5 01/18/2024    PHUR 5.5 02/14/2022    LEUKOCYTESUR Negative 01/18/2024    NITRITE Negative 01/18/2024    GLUCOSEU 3+ (A) 01/18/2024    KETONESU Negative 01/18/2024    BILIRUBINUR Negative 01/18/2024    BLOODU Negative 01/18/2024   ,   Urine Culture: No results found for: \"URINECX\",   Wound Culure: No results found for: \"WOUNDCULT\"        Physical Exam:    General appearance: alert and oriented, in no acute distress. Flat in bed and comfortable.  Skin: Skin color, texture, turgor normal. No rashes or lesions  Neurologic: Grossly normal  Head: Normocephalic, without obvious abnormality, atraumatic  Eyes: PERRLE. EOMI.  Neck: Thick neck. No JVD appreciated.  Lungs: overall ctab. Non-labored  Chest wall: Port; L chest PPM, no erosion.   Heart: RRR S1S2 sm  Abdomen: Obese, soft, NT  Extremities: No pulses in the feet. Decreased sensation to the planter aspect of the foot and toes (chronic).    R hallux ulceration. L lateral foot eschar.     Wound/Incision:                  Pulse exam:  Radial: Right: 2+ Left:: 1+  Femoral: Right: 1+ Left: 1+  DP: Right: non-palpable Left: non-palpable  PT: Right: doppler signal and non-palpable Left: doppler signal and non-palpable      Ginna Gonzales PA-C  10/23/2024   [facility]    "

## 2024-10-24 LAB
ANION GAP SERPL CALCULATED.3IONS-SCNC: 7 MMOL/L (ref 4–13)
BUN SERPL-MCNC: 18 MG/DL (ref 5–25)
CALCIUM SERPL-MCNC: 7.9 MG/DL (ref 8.4–10.2)
CHLORIDE SERPL-SCNC: 109 MMOL/L (ref 96–108)
CO2 SERPL-SCNC: 23 MMOL/L (ref 21–32)
CREAT SERPL-MCNC: 0.74 MG/DL (ref 0.6–1.3)
ERYTHROCYTE [DISTWIDTH] IN BLOOD BY AUTOMATED COUNT: 15.9 % (ref 11.6–15.1)
GFR SERPL CREATININE-BSD FRML MDRD: 88 ML/MIN/1.73SQ M
GLUCOSE SERPL-MCNC: 115 MG/DL (ref 65–140)
GLUCOSE SERPL-MCNC: 120 MG/DL (ref 65–140)
GLUCOSE SERPL-MCNC: 73 MG/DL (ref 65–140)
GLUCOSE SERPL-MCNC: 80 MG/DL (ref 65–140)
GLUCOSE SERPL-MCNC: 97 MG/DL (ref 65–140)
HCT VFR BLD AUTO: 35.8 % (ref 36.5–49.3)
HGB BLD-MCNC: 11.2 G/DL (ref 12–17)
MCH RBC QN AUTO: 31 PG (ref 26.8–34.3)
MCHC RBC AUTO-ENTMCNC: 31.3 G/DL (ref 31.4–37.4)
MCV RBC AUTO: 99 FL (ref 82–98)
PLATELET # BLD AUTO: 155 THOUSANDS/UL (ref 149–390)
PMV BLD AUTO: 9.6 FL (ref 8.9–12.7)
POTASSIUM SERPL-SCNC: 3.9 MMOL/L (ref 3.5–5.3)
RBC # BLD AUTO: 3.61 MILLION/UL (ref 3.88–5.62)
SODIUM SERPL-SCNC: 139 MMOL/L (ref 135–147)
VANCOMYCIN SERPL-MCNC: 14.6 UG/ML (ref 10–20)
WBC # BLD AUTO: 6.09 THOUSAND/UL (ref 4.31–10.16)

## 2024-10-24 PROCEDURE — 85027 COMPLETE CBC AUTOMATED: CPT | Performed by: INTERNAL MEDICINE

## 2024-10-24 PROCEDURE — 82948 REAGENT STRIP/BLOOD GLUCOSE: CPT

## 2024-10-24 PROCEDURE — 80202 ASSAY OF VANCOMYCIN: CPT | Performed by: INTERNAL MEDICINE

## 2024-10-24 PROCEDURE — 80048 BASIC METABOLIC PNL TOTAL CA: CPT | Performed by: INTERNAL MEDICINE

## 2024-10-24 PROCEDURE — 99232 SBSQ HOSP IP/OBS MODERATE 35: CPT | Performed by: INTERNAL MEDICINE

## 2024-10-24 RX ORDER — LORATADINE 10 MG/1
10 TABLET ORAL DAILY
Status: CANCELLED | OUTPATIENT
Start: 2024-10-24

## 2024-10-24 RX ADMIN — PREDNISONE 5 MG: 10 TABLET ORAL at 17:00

## 2024-10-24 RX ADMIN — LOSARTAN POTASSIUM 12.5 MG: 25 TABLET, FILM COATED ORAL at 08:05

## 2024-10-24 RX ADMIN — Medication 524 MG: at 08:15

## 2024-10-24 RX ADMIN — OXYCODONE HYDROCHLORIDE 5 MG: 5 TABLET ORAL at 17:49

## 2024-10-24 RX ADMIN — POTASSIUM CHLORIDE 20 MEQ: 1500 TABLET, EXTENDED RELEASE ORAL at 08:05

## 2024-10-24 RX ADMIN — METOPROLOL SUCCINATE 25 MG: 25 TABLET, EXTENDED RELEASE ORAL at 08:04

## 2024-10-24 RX ADMIN — ATORVASTATIN CALCIUM 80 MG: 40 TABLET, FILM COATED ORAL at 17:00

## 2024-10-24 RX ADMIN — VANCOMYCIN HYDROCHLORIDE 1000 MG: 1 INJECTION, SOLUTION INTRAVENOUS at 20:13

## 2024-10-24 RX ADMIN — PREDNISONE 5 MG: 10 TABLET ORAL at 08:05

## 2024-10-24 RX ADMIN — ASPIRIN 81 MG 81 MG: 81 TABLET ORAL at 08:05

## 2024-10-24 RX ADMIN — EZETIMIBE 10 MG: 10 TABLET ORAL at 08:05

## 2024-10-24 RX ADMIN — VANCOMYCIN HYDROCHLORIDE 1000 MG: 1 INJECTION, SOLUTION INTRAVENOUS at 08:06

## 2024-10-24 RX ADMIN — CEFEPIME HYDROCHLORIDE 2000 MG: 2 INJECTION, SOLUTION INTRAVENOUS at 05:42

## 2024-10-24 RX ADMIN — OXYCODONE HYDROCHLORIDE 5 MG: 5 TABLET ORAL at 08:15

## 2024-10-24 RX ADMIN — GABAPENTIN 600 MG: 300 CAPSULE ORAL at 08:05

## 2024-10-24 RX ADMIN — FUROSEMIDE 20 MG: 20 TABLET ORAL at 08:04

## 2024-10-24 RX ADMIN — CLOPIDOGREL 75 MG: 75 TABLET ORAL at 08:05

## 2024-10-24 RX ADMIN — OXYCODONE HYDROCHLORIDE 5 MG: 5 TABLET ORAL at 23:43

## 2024-10-24 RX ADMIN — GABAPENTIN 600 MG: 300 CAPSULE ORAL at 17:00

## 2024-10-24 RX ADMIN — ENOXAPARIN SODIUM 40 MG: 40 INJECTION SUBCUTANEOUS at 08:05

## 2024-10-24 NOTE — ASSESSMENT & PLAN NOTE
Patient with chronic lower extremity wounds with poor wound healing. Reports worsening appearance of the right great toe since injuring it recently by hitting it against a refrigerator.   XR R foot: Irregularity of the soft tissues of the great toe as well as a small focus of lucency in the distal aspect of the first distal phalanx suspicious for osteomyelitis   Does not meet sepsis criteria on admission  Vascular surgery consulted  HEATHER duplex: Right lower limb: High-grade stenosis versus occlusion in the proximal, mid and distal superficial femoral artery with reconstitution distally.  Left lower limb: High-grade stenosis versus occlusion in the proximal and mid superficial femoral artery with reconstitution distally  Plan for IR angiogram of the RLE. IR unable to complete at [facility]. Will transfer to [facility] for angiogram on Wednesday 10/30   Podiatry consulted. Planning for partial amputation of the right hallux distal phalanx post vascular intervention  Wound culture preliminary without growth. Will discontinue cefepime and continue on vancomycin   WBAT in post-op shoe. Wound care per nursing

## 2024-10-24 NOTE — PLAN OF CARE
Problem: PAIN - ADULT  Goal: Verbalizes/displays adequate comfort level or baseline comfort level  Description: Interventions:  - Encourage patient to monitor pain and request assistance  - Assess pain using appropriate pain scale  - Administer analgesics based on type and severity of pain and evaluate response  - Implement non-pharmacological measures as appropriate and evaluate response  - Consider cultural and social influences on pain and pain management  - Notify physician/advanced practitioner if interventions unsuccessful or patient reports new pain  Outcome: Progressing     Problem: INFECTION - ADULT  Goal: Absence or prevention of progression during hospitalization  Description: INTERVENTIONS:  - Assess and monitor for signs and symptoms of infection  - Monitor lab/diagnostic results  - Monitor all insertion sites, i.e. indwelling lines, tubes, and drains  - Monitor endotracheal if appropriate and nasal secretions for changes in amount and color  - [place] appropriate cooling/warming therapies per order  - Administer medications as ordered  - Instruct and encourage patient and family to use good hand hygiene technique  - Identify and instruct in appropriate isolation precautions for identified infection/condition  Outcome: Progressing  Goal: Absence of fever/infection during neutropenic period  Description: INTERVENTIONS:  - Monitor WBC    Outcome: Progressing     Problem: SAFETY ADULT  Goal: Patient will remain free of falls  Description: INTERVENTIONS:  - Educate patient/family on patient safety including physical limitations  - Instruct patient to call for assistance with activity   - Consult OT/PT to assist with strengthening/mobility   - Keep Call bell within reach  - Keep bed low and locked with side rails adjusted as appropriate  - Keep care items and personal belongings within reach  - Initiate and maintain comfort rounds  - Make Fall Risk Sign visible to staff  - Offer Toileting every 2 Hours,  in advance of need  - Initiate/Maintain bed alarm  - Obtain necessary fall risk management equipment: yellow socks   - Apply yellow socks and bracelet for high fall risk patients  - Consider moving patient to room near nurses station  Outcome: Progressing  Goal: Maintain or return to baseline ADL function  Description: INTERVENTIONS:  -  Assess patient's ability to carry out ADLs; assess patient's baseline for ADL function and identify physical deficits which impact ability to perform ADLs (bathing, care of mouth/teeth, toileting, grooming, dressing, etc.)  - Assess/evaluate cause of self-care deficits   - Assess range of motion  - Assess patient's mobility; develop plan if impaired  - Assess patient's need for assistive devices and provide as appropriate  - Encourage maximum independence but intervene and supervise when necessary  - Involve family in performance of ADLs  - Assess for home care needs following discharge   - Consider OT consult to assist with ADL evaluation and planning for discharge  - Provide patient education as appropriate  Outcome: Progressing     Problem: DISCHARGE PLANNING  Goal: Discharge to home or other facility with appropriate resources  Description: INTERVENTIONS:  - Identify barriers to discharge w/patient and caregiver  - Arrange for needed discharge resources and transportation as appropriate  - Identify discharge learning needs (meds, wound care, etc.)  - Arrange for interpretive services to assist at discharge as needed  - Refer to Case Management Department for coordinating discharge planning if the patient needs post-hospital services based on physician/advanced practitioner order or complex needs related to functional status, cognitive ability, or social support system  Outcome: Progressing     Problem: Knowledge Deficit  Goal: Patient/family/caregiver demonstrates understanding of disease process, treatment plan, medications, and discharge instructions  Description: Complete  learning assessment and assess knowledge base.  Interventions:  - Provide teaching at level of understanding  - Provide teaching via preferred learning methods  Outcome: Progressing

## 2024-10-24 NOTE — PLAN OF CARE
Problem: PAIN - ADULT  Goal: Verbalizes/displays adequate comfort level or baseline comfort level  Description: Interventions:  - Encourage patient to monitor pain and request assistance  - Assess pain using appropriate pain scale  - Administer analgesics based on type and severity of pain and evaluate response  - Implement non-pharmacological measures as appropriate and evaluate response  - Consider cultural and social influences on pain and pain management  - Notify physician/advanced practitioner if interventions unsuccessful or patient reports new pain  Outcome: Progressing     Problem: INFECTION - ADULT  Goal: Absence or prevention of progression during hospitalization  Description: INTERVENTIONS:  - Assess and monitor for signs and symptoms of infection  - Monitor lab/diagnostic results  - Monitor all insertion sites, i.e. indwelling lines, tubes, and drains  - Monitor endotracheal if appropriate and nasal secretions for changes in amount and color  - [place] appropriate cooling/warming therapies per order  - Administer medications as ordered  - Instruct and encourage patient and family to use good hand hygiene technique  - Identify and instruct in appropriate isolation precautions for identified infection/condition  Outcome: Progressing  Goal: Absence of fever/infection during neutropenic period  Description: INTERVENTIONS:  - Monitor WBC    Outcome: Progressing     Problem: SAFETY ADULT  Goal: Patient will remain free of falls  Description: INTERVENTIONS:  - Educate patient/family on patient safety including physical limitations  - Instruct patient to call for assistance with activity   - Consult OT/PT to assist with strengthening/mobility   - Keep Call bell within reach  - Keep bed low and locked with side rails adjusted as appropriate  - Keep care items and personal belongings within reach  - Initiate and maintain comfort rounds  - Make Fall Risk Sign visible to staff  - Offer Toileting every 2 Hours,  in advance of need  - Initiate/Maintain bed alarm  - Obtain necessary fall risk management equipment:   - Apply yellow socks and bracelet for high fall risk patients  - Consider moving patient to room near nurses station  Outcome: Progressing  Goal: Maintain or return to baseline ADL function  Description: INTERVENTIONS:  -  Assess patient's ability to carry out ADLs; assess patient's baseline for ADL function and identify physical deficits which impact ability to perform ADLs (bathing, care of mouth/teeth, toileting, grooming, dressing, etc.)  - Assess/evaluate cause of self-care deficits   - Assess range of motion  - Assess patient's mobility; develop plan if impaired  - Assess patient's need for assistive devices and provide as appropriate  - Encourage maximum independence but intervene and supervise when necessary  - Involve family in performance of ADLs  - Assess for home care needs following discharge   - Consider OT consult to assist with ADL evaluation and planning for discharge  - Provide patient education as appropriate  Outcome: Progressing       Problem: DISCHARGE PLANNING  Goal: Discharge to home or other facility with appropriate resources  Description: INTERVENTIONS:  - Identify barriers to discharge w/patient and caregiver  - Arrange for needed discharge resources and transportation as appropriate  - Identify discharge learning needs (meds, wound care, etc.)  - Arrange for interpretive services to assist at discharge as needed  - Refer to Case Management Department for coordinating discharge planning if the patient needs post-hospital services based on physician/advanced practitioner order or complex needs related to functional status, cognitive ability, or social support system  Outcome: Progressing     Problem: Knowledge Deficit  Goal: Patient/family/caregiver demonstrates understanding of disease process, treatment plan, medications, and discharge instructions  Description: Complete learning  assessment and assess knowledge base.  Interventions:  - Provide teaching at level of understanding  - Provide teaching via preferred learning methods  Outcome: Progressing     Problem: SAFETY ADULT  Goal: Maintain or return to baseline ADL function  Description: INTERVENTIONS:  -  Assess patient's ability to carry out ADLs; assess patient's baseline for ADL function and identify physical deficits which impact ability to perform ADLs (bathing, care of mouth/teeth, toileting, grooming, dressing, etc.)  - Assess/evaluate cause of self-care deficits   - Assess range of motion  - Assess patient's mobility; develop plan if impaired  - Assess patient's need for assistive devices and provide as appropriate  - Encourage maximum independence but intervene and supervise when necessary  - Involve family in performance of ADLs  - Assess for home care needs following discharge   - Consider OT consult to assist with ADL evaluation and planning for discharge  - Provide patient education as appropriate  Outcome: Progressing     Problem: DISCHARGE PLANNING  Goal: Discharge to home or other facility with appropriate resources  Description: INTERVENTIONS:  - Identify barriers to discharge w/patient and caregiver  - Arrange for needed discharge resources and transportation as appropriate  - Identify discharge learning needs (meds, wound care, etc.)  - Arrange for interpretive services to assist at discharge as needed  - Refer to Case Management Department for coordinating discharge planning if the patient needs post-hospital services based on physician/advanced practitioner order or complex needs related to functional status, cognitive ability, or social support system  Outcome: Progressing     Problem: Knowledge Deficit  Goal: Patient/family/caregiver demonstrates understanding of disease process, treatment plan, medications, and discharge instructions  Description: Complete learning assessment and assess knowledge  base.  Interventions:  - Provide teaching at level of understanding  - Provide teaching via preferred learning methods  Outcome: Progressing

## 2024-10-24 NOTE — ASSESSMENT & PLAN NOTE
Patient with chronic lower extremity wounds with poor wound healing. Reports worsening appearance of the right great toe since injuring it recently by hitting it against a refrigerator.   XR R foot: Irregularity of the soft tissues of the great toe as well as a small focus of lucency in the distal aspect of the first distal phalanx suspicious for osteomyelitis   Does not meet sepsis criteria on admission  Vascular surgery consulted.   HEATHER duplex: RLE: High-grade stenosis versus occlusion in the proximal, mid and distal superficial femoral artery with reconstitution distally. LLE: High-grade stenosis versus occlusion in the proximal and mid superficial femoral artery with reconstitution distally  Plan for IR angiogram of the RLE. IR unable to perform angiogram at [facility] due to complexity. Plan to transfer to [facility] over the weekend vs Monday for IR intervention Wednesday 10/30/24.   Podiatry consulted. Planning for partial amputation of the right hallux distal phalanx post vascular intervention. Agreeable to transfer and Podiatry service can take over case while at [place].    Wound culture preliminary without growth. Will discontinue cefepime and continue on vancomycin   WBAT in post-op shoe. Wound care per nursing

## 2024-10-24 NOTE — PROGRESS NOTES
Royce Rene is a 78 y.o. male who is currently ordered Vancomycin IV with management by the Pharmacy Consult service.  Relevant clinical data and objective / subjective history reviewed.  Vancomycin Assessment:  Indication and Goal AUC/Trough: Soft tissue (goal -600, trough >10), -600, trough >10  Clinical Status: stable  Micro:     Renal Function:  SCr: 0.86 mg/dL  CrCl: 79.6 mL/min  Renal replacement: Not on dialysis  Days of Therapy: 3  Current Dose: Vancomycin 1000mg IV q12hrs  Vancomycin Plan:  New Dosin mg IV q12h  Estimated AUC: 488 mcg*hr/mL  Estimated Trough: 14.6 mcg/mL  Next Level: 10/30 AM labs  Renal Function Monitoring: Daily BMP and UOP  Pharmacy will continue to follow closely for s/sx of nephrotoxicity, infusion reactions and appropriateness of therapy.  BMP and CBC will be ordered per protocol. We will continue to follow the patient’s culture results and clinical progress daily.    Alondra De Santiago, Pharmacist

## 2024-10-24 NOTE — ASSESSMENT & PLAN NOTE
Wt Readings from Last 3 Encounters:   10/24/24 98.5 kg (217 lb 2 oz)   10/09/24 100 kg (221 lb 5.5 oz)   09/25/24 99.8 kg (220 lb)     Currently euvolemic on exam  Echo (8/2/23): The left ventricular ejection fraction is 30-35%. Systolic function is severely reduced. There is mid anteroseptal, mid inferoseptal, apical septal, apical lateral, apical inferior, apical anterior and apical akinesis with wall thinning, suggestive of aneurysmal changes. Diastolic function is mildly abnormal, consistent with grade I (abnormal) relaxation.   Continue Lasix 20mg daily  Cardiac diet, daily weights, I&Os  Outpatient Cardiology follow up

## 2024-10-24 NOTE — PROGRESS NOTES
Progress Note - Hospitalist   Name: Royce Rene 78 y.o. male I MRN: [id number]  Unit/Bed#: -01 I Date of Admission: 10/22/2024   Date of Service: 10/24/2024 I Hospital Day: 2    Assessment & Plan  Osteomyelitis (HCC)  Patient with chronic lower extremity wounds with poor wound healing. Reports worsening appearance of the right great toe since injuring it recently by hitting it against a refrigerator.   XR R foot: Irregularity of the soft tissues of the great toe as well as a small focus of lucency in the distal aspect of the first distal phalanx suspicious for osteomyelitis   Does not meet sepsis criteria on admission  Vascular surgery consulted.   HEATHER duplex: RLE: High-grade stenosis versus occlusion in the proximal, mid and distal superficial femoral artery with reconstitution distally. LLE: High-grade stenosis versus occlusion in the proximal and mid superficial femoral artery with reconstitution distally  Plan for IR angiogram of the RLE. IR unable to perform angiogram at [facility] due to complexity. Plan to transfer to [facility] over the weekend vs Monday for IR intervention Wednesday 10/30/24.   Podiatry consulted. Planning for partial amputation of the right hallux distal phalanx post vascular intervention. Agreeable to transfer and Podiatry service can take over case while at [place].    Wound culture preliminary without growth. Will discontinue cefepime and continue on vancomycin   WBAT in post-op shoe. Wound care per nursing  Chronic combined systolic and diastolic CHF (congestive heart failure) ([facility])  Wt Readings from Last 3 Encounters:   10/24/24 98.5 kg (217 lb 2 oz)   10/09/24 100 kg (221 lb 5.5 oz)   09/25/24 99.8 kg (220 lb)     Currently euvolemic on exam  Echo (8/2/23): The left ventricular ejection fraction is 30-35%. Systolic function is severely reduced. There is mid anteroseptal, mid inferoseptal, apical septal, apical lateral, apical inferior, apical anterior and apical akinesis with  wall thinning, suggestive of aneurysmal changes. Diastolic function is mildly abnormal, consistent with grade I (abnormal) relaxation.   Continue Lasix 20mg daily  Cardiac diet, daily weights, I&Os  Outpatient Cardiology follow up  CAD (coronary artery disease)  Denies any chest pain  Continue ASA, statin, BB therapy  PAD (peripheral artery disease) (HCC)  History of PAD and s/p stenting  HEATHER duplex studying pending  Continue ASA and statin therapy  IR consulted for RLE angiogram +/- intervention  Vascular surgery consulted, recommendations are appreciated  Moderate vascular dementia (HCC)  Patient with history of vascular dementia. 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 Hernandez for Oncology outpatient     VTE Pharmacologic Prophylaxis:   Moderate Risk (Score 3-4) - Pharmacological DVT Prophylaxis Ordered: enoxaparin (Lovenox).    Mobility:   Basic Mobility Inpatient Raw Score: 16  JH-HLM Goal: 5: Stand one or more mins  JH-HLM Achieved: 6: Walk 10 steps or more  JH-HLM Goal achieved. Continue to encourage appropriate mobility.    Patient Centered Rounds: I performed bedside rounds with nursing staff today.   Discussions with Specialists or Other Care Team Provider: Nursing    Education and Discussions with Family / Patient: Updated  (son) via phone.    Current Length of Stay: 2 day(s)  Current Patient Status: Inpatient   Certification Statement: The patient will continue to require additional inpatient hospital stay due to osteomyelitis, PAD  Discharge Plan: Anticipate discharge in >72 hrs to TBD pending PT/OT evaluation post op    Code Status: Level 1 - Full Code    Subjective   Patient sitting upright in bed eating breakfast. Denies any acute complaints.      Objective :  Temp:  [97.6 °F (36.4 °C)-99.2 °F (37.3 °C)] 97.6 °F (36.4 °C)  HR:  [78-89] 85  BP: (117-149)/(77-83) 117/83  Resp:  [20] 20  SpO2:  [94 %-96 %] 96 %  O2 Device: None (Room air)    Body mass index is 34.01 kg/m².     Input and Output Summary (last 24 hours):     Intake/Output Summary (Last 24 hours) at 10/24/2024 0846  Last data filed at 10/24/2024 0701  Gross per 24 hour   Intake 800 ml   Output 750 ml   Net 50 ml       Physical Exam  Vitals and nursing note reviewed.   Constitutional:       General: He is not in acute distress.     Appearance: He is well-developed. He is obese.   HENT:      Head: Normocephalic and atraumatic.   Eyes:      Conjunctiva/sclera: Conjunctivae normal.   Cardiovascular:      Rate and Rhythm: Normal rate and regular rhythm.      Heart sounds: No murmur heard.  Pulmonary:      Effort: Pulmonary effort is normal. No respiratory distress.      Breath sounds: Normal breath sounds.   Abdominal:      Palpations: Abdomen is soft.      Tenderness: There is no abdominal tenderness.   Musculoskeletal:         General: No swelling.      Cervical back: Neck supple.   Feet:      Comments: See image below of great toe of R foot. No easily audible DP pulses of bilateral lower extremity  Skin:     General: Skin is warm and dry.      Capillary Refill: Capillary refill takes less than 2 seconds.   Neurological:      Mental Status: He is alert.   Psychiatric:         Mood and Affect: Mood normal.           Lines/Drains:      Central Line:  Goal for removal:  Chronic for chemotherapy               Lab Results: I have reviewed the following results:   Results from last 7 days   Lab Units 10/24/24  0527 10/23/24  0543 10/22/24  1536   WBC Thousand/uL 6.09   < > 9.52   HEMOGLOBIN g/dL 11.2*   < > 12.1   HEMATOCRIT % 35.8*   < > 37.8   PLATELETS Thousands/uL 155   < > 169   BANDS PCT %  --   --  3   LYMPHO PCT %  --   --  9*   MONO PCT %  --   --  6   EOS PCT %  --   --  1    < > = values in  this interval not displayed.     Results from last 7 days   Lab Units 10/24/24  0527 10/23/24  0543   SODIUM mmol/L 139 140   POTASSIUM mmol/L 3.9 3.6   CHLORIDE mmol/L 109* 110*   CO2 mmol/L 23 22   BUN mg/dL 18 25   CREATININE mg/dL 0.74 0.74   ANION GAP mmol/L 7 8   CALCIUM mg/dL 7.9* 7.9*   ALBUMIN g/dL  --  3.6   TOTAL BILIRUBIN mg/dL  --  0.80   ALK PHOS U/L  --  58   ALT U/L  --  18   AST U/L  --  11*   GLUCOSE RANDOM mg/dL 73 85         Results from last 7 days   Lab Units 10/24/24  0705 10/23/24  2036 10/23/24  1600 10/23/24  1056 10/23/24  0718 10/22/24  2032 10/22/24  1746   POC GLUCOSE mg/dl 80 104 92 97 94 99 85     Results from last 7 days   Lab Units 10/23/24  0543   HEMOGLOBIN A1C % 5.5     Results from last 7 days   Lab Units 10/22/24  1536   PROCALCITONIN ng/ml 0.07       Recent Cultures (last 7 days):   Results from last 7 days   Lab Units 10/23/24  0931   GRAM STAIN RESULT  No Polys or Bacteria seen       Imaging Results Review: No pertinent imaging studies reviewed.  Other Study Results Review: No additional pertinent studies reviewed.    Last 24 Hours Medication List:     Current Facility-Administered Medications:     acetaminophen (TYLENOL) tablet 650 mg, Q6H PRN    aspirin chewable tablet 81 mg, Daily    atorvastatin (LIPITOR) tablet 80 mg, Daily With Dinner    bismuth subsalicylate (PEPTO BISMOL) oral suspension 524 mg, Q6H PRN    clopidogrel (PLAVIX) tablet 75 mg, Daily    enoxaparin (LOVENOX) subcutaneous injection 40 mg, Q24H DEWAYNE    ezetimibe (ZETIA) tablet 10 mg, Daily    furosemide (LASIX) tablet 20 mg, Daily    gabapentin (NEURONTIN) capsule 600 mg, BID    insulin lispro (HumALOG/ADMELOG) 100 units/mL subcutaneous injection 1-5 Units, TID AC **AND** Fingerstick Glucose (POCT), TID AC    losartan (COZAAR) tablet 12.5 mg, Daily    metoprolol succinate (TOPROL-XL) 24 hr tablet 25 mg, Daily    oxyCODONE (ROXICODONE) IR tablet 5 mg, Q4H PRN    potassium chloride (Klor-Con M20) CR tablet 20  mEq, Daily    predniSONE tablet 5 mg, BID With Meals    vancomycin (VANCOCIN) IVPB (premix in dextrose) 1,000 mg 200 mL, Q12H, Last Rate: 1,000 mg (10/24/24 0806)    Administrative Statements   Today, Patient Was Seen By: Asuncion Carver PA-C  I have spent a total time of 30 minutes in caring for this patient on the day of the visit/encounter including Diagnostic results, Instructions for management, Patient and family education, Importance of tx compliance, Counseling / Coordination of care, Documenting in the medical record, Reviewing / ordering tests, medicine, procedures  , Obtaining or reviewing history  , and Communicating with other healthcare professionals .    **Please Note: This note may have been constructed using a voice recognition system.**

## 2024-10-25 LAB
ANION GAP SERPL CALCULATED.3IONS-SCNC: 7 MMOL/L (ref 4–13)
BUN SERPL-MCNC: 20 MG/DL (ref 5–25)
CALCIUM SERPL-MCNC: 8.7 MG/DL (ref 8.4–10.2)
CHLORIDE SERPL-SCNC: 109 MMOL/L (ref 96–108)
CO2 SERPL-SCNC: 25 MMOL/L (ref 21–32)
CREAT SERPL-MCNC: 0.75 MG/DL (ref 0.6–1.3)
ERYTHROCYTE [DISTWIDTH] IN BLOOD BY AUTOMATED COUNT: 15.8 % (ref 11.6–15.1)
GFR SERPL CREATININE-BSD FRML MDRD: 87 ML/MIN/1.73SQ M
GLUCOSE SERPL-MCNC: 129 MG/DL (ref 65–140)
GLUCOSE SERPL-MCNC: 131 MG/DL (ref 65–140)
GLUCOSE SERPL-MCNC: 143 MG/DL (ref 65–140)
GLUCOSE SERPL-MCNC: 84 MG/DL (ref 65–140)
GLUCOSE SERPL-MCNC: 89 MG/DL (ref 65–140)
HCT VFR BLD AUTO: 36.8 % (ref 36.5–49.3)
HGB BLD-MCNC: 11.8 G/DL (ref 12–17)
MCH RBC QN AUTO: 30.5 PG (ref 26.8–34.3)
MCHC RBC AUTO-ENTMCNC: 32.1 G/DL (ref 31.4–37.4)
MCV RBC AUTO: 95 FL (ref 82–98)
PLATELET # BLD AUTO: 163 THOUSANDS/UL (ref 149–390)
PMV BLD AUTO: 9.8 FL (ref 8.9–12.7)
POTASSIUM SERPL-SCNC: 4.2 MMOL/L (ref 3.5–5.3)
RBC # BLD AUTO: 3.87 MILLION/UL (ref 3.88–5.62)
SODIUM SERPL-SCNC: 141 MMOL/L (ref 135–147)
WBC # BLD AUTO: 8.22 THOUSAND/UL (ref 4.31–10.16)

## 2024-10-25 PROCEDURE — 85027 COMPLETE CBC AUTOMATED: CPT | Performed by: INTERNAL MEDICINE

## 2024-10-25 PROCEDURE — 99232 SBSQ HOSP IP/OBS MODERATE 35: CPT | Performed by: PHYSICIAN ASSISTANT

## 2024-10-25 PROCEDURE — 80048 BASIC METABOLIC PNL TOTAL CA: CPT | Performed by: INTERNAL MEDICINE

## 2024-10-25 PROCEDURE — 97542 WHEELCHAIR MNGMENT TRAINING: CPT

## 2024-10-25 PROCEDURE — 97530 THERAPEUTIC ACTIVITIES: CPT

## 2024-10-25 PROCEDURE — 82948 REAGENT STRIP/BLOOD GLUCOSE: CPT

## 2024-10-25 RX ORDER — CEFEPIME HYDROCHLORIDE 2 G/50ML
2000 INJECTION, SOLUTION INTRAVENOUS EVERY 12 HOURS
Status: DISCONTINUED | OUTPATIENT
Start: 2024-10-25 | End: 2024-10-28 | Stop reason: HOSPADM

## 2024-10-25 RX ORDER — CEFEPIME HYDROCHLORIDE 1 G/50ML
1000 INJECTION, SOLUTION INTRAVENOUS EVERY 12 HOURS
Status: DISCONTINUED | OUTPATIENT
Start: 2024-10-25 | End: 2024-10-25

## 2024-10-25 RX ADMIN — ASPIRIN 81 MG 81 MG: 81 TABLET ORAL at 08:04

## 2024-10-25 RX ADMIN — ATORVASTATIN CALCIUM 80 MG: 40 TABLET, FILM COATED ORAL at 17:08

## 2024-10-25 RX ADMIN — VANCOMYCIN HYDROCHLORIDE 1000 MG: 1 INJECTION, SOLUTION INTRAVENOUS at 10:20

## 2024-10-25 RX ADMIN — LOSARTAN POTASSIUM 12.5 MG: 25 TABLET, FILM COATED ORAL at 08:04

## 2024-10-25 RX ADMIN — ENOXAPARIN SODIUM 40 MG: 40 INJECTION SUBCUTANEOUS at 08:04

## 2024-10-25 RX ADMIN — FUROSEMIDE 20 MG: 20 TABLET ORAL at 08:05

## 2024-10-25 RX ADMIN — VANCOMYCIN HYDROCHLORIDE 1000 MG: 1 INJECTION, SOLUTION INTRAVENOUS at 20:56

## 2024-10-25 RX ADMIN — PREDNISONE 5 MG: 10 TABLET ORAL at 08:05

## 2024-10-25 RX ADMIN — GABAPENTIN 600 MG: 300 CAPSULE ORAL at 17:08

## 2024-10-25 RX ADMIN — GABAPENTIN 600 MG: 300 CAPSULE ORAL at 08:04

## 2024-10-25 RX ADMIN — POTASSIUM CHLORIDE 20 MEQ: 1500 TABLET, EXTENDED RELEASE ORAL at 08:05

## 2024-10-25 RX ADMIN — OXYCODONE HYDROCHLORIDE 5 MG: 5 TABLET ORAL at 21:00

## 2024-10-25 RX ADMIN — OXYCODONE HYDROCHLORIDE 5 MG: 5 TABLET ORAL at 05:32

## 2024-10-25 RX ADMIN — PREDNISONE 5 MG: 10 TABLET ORAL at 17:08

## 2024-10-25 RX ADMIN — CLOPIDOGREL 75 MG: 75 TABLET ORAL at 08:05

## 2024-10-25 RX ADMIN — EZETIMIBE 10 MG: 10 TABLET ORAL at 08:05

## 2024-10-25 RX ADMIN — CEFEPIME HYDROCHLORIDE 2000 MG: 2 INJECTION, SOLUTION INTRAVENOUS at 14:21

## 2024-10-25 RX ADMIN — OXYCODONE HYDROCHLORIDE 5 MG: 5 TABLET ORAL at 14:33

## 2024-10-25 RX ADMIN — METOPROLOL SUCCINATE 25 MG: 25 TABLET, EXTENDED RELEASE ORAL at 08:05

## 2024-10-25 NOTE — ASSESSMENT & PLAN NOTE
Patient with history of vascular dementia. Reports that he lives in an apartment complex with his son.  PT/OT cognitive evaluation  Supportive care

## 2024-10-25 NOTE — UTILIZATION REVIEW
NOTIFICATION OF INPATIENT ADMISSION   AUTHORIZATION REQUEST   SERVICING FACILITY:   [place] [address]  Tax ID: [id number]  NPI: [id number] ATTENDING PROVIDER:  Attending Name and NPI#: Vini Oliver Do [[id number]]  Address: [address] [place] [address]  Phone:  [phone]     ADMISSION INFORMATION:  Place of Service: Inpatient [facility]  Place of Service Code: 21  Inpatient Admission Date/Time: 10/22/24  4:40 PM  Discharge Date/Time: No discharge date for patient encounter.  Admitting Diagnosis Code/Description:  Wound infection [T14.8XXA, L08.9]  PAD (peripheral artery disease) (HCC) [I73.9]  Wound of foot [S91.309A]     UTILIZATION REVIEW CONTACT:  Nandini Johnston Utilization   Network Utilization Review Department  Phone: [phone]  Fax: [phone]  Email: [email or web address]  Contact for approvals/pending authorizations, clinical reviews, and discharge.     PHYSICIAN ADVISORY SERVICES:  Medical Necessity Denial & [employer] Review  Phone: [phone]  Fax: [phone]  Email: [email or web address]     DISCHARGE SUPPORT TEAM:  For Patients Discharge Needs & Updates  Phone: [phone] opt. 2 Fax: [phone]  Email: [email or web address]

## 2024-10-25 NOTE — ASSESSMENT & PLAN NOTE
History of PAD s/p prior stenting  HEATHER duplex:   RLE: High-grade stenosis versus occlusion in the proximal, mid and distal superficial femoral artery with reconstitution distally.   LLE: High-grade stenosis versus occlusion in the proximal and mid superficial femoral artery with reconstitution distally.  Vascular surgery following, patient needs IR angiogram of  RLE prior to any surgical interventions   Plan for eventual transfer to [facility] over the weekend or Monday - IR unable to perform angiogram at [facility] due to complexity, tentatively plan for angiogram at [place] on 10/30 or 10/31  Continue DAPT with Plavix/aspirin, statin

## 2024-10-25 NOTE — ASSESSMENT & PLAN NOTE
Patient presented with worsening wound on right great toe since recently hitting it against a refrigerator. Hx chronic LE wounds with poor wound healing.  XR R foot: Irregularity of the soft tissues of the great toe as well as a small focus of lucency in the distal aspect of the first distal phalanx suspicious for osteomyelitis   Did not meet sepsis criteria on admission  Wound cultures initially with no growth, however now revealing 2+ growth of Pseudomonas aeruginosa  Podiatry following, maintaining on IV vancomycin & await angiogram prior to considering toe amputation  Resume IV cefepime with Pseudomonas on wound cultures & continue IV vancomycin - await possible surgical interventions post angiogram  WBAT in post-op shoe. Wound care per nursing.

## 2024-10-25 NOTE — PLAN OF CARE
Problem: PAIN - ADULT  Goal: Verbalizes/displays adequate comfort level or baseline comfort level  Description: Interventions:  - Encourage patient to monitor pain and request assistance  - Assess pain using appropriate pain scale  - Administer analgesics based on type and severity of pain and evaluate response  - Implement non-pharmacological measures as appropriate and evaluate response  - Consider cultural and social influences on pain and pain management  - Notify physician/advanced practitioner if interventions unsuccessful or patient reports new pain  Outcome: Progressing     Problem: SAFETY ADULT  Goal: Maintain or return to baseline ADL function  Description: INTERVENTIONS:  -  Assess patient's ability to carry out ADLs; assess patient's baseline for ADL function and identify physical deficits which impact ability to perform ADLs (bathing, care of mouth/teeth, toileting, grooming, dressing, etc.)  - Assess/evaluate cause of self-care deficits   - Assess range of motion  - Assess patient's mobility; develop plan if impaired  - Assess patient's need for assistive devices and provide as appropriate  - Encourage maximum independence but intervene and supervise when necessary  - Involve family in performance of ADLs  - Assess for home care needs following discharge   - Consider OT consult to assist with ADL evaluation and planning for discharge  - Provide patient education as appropriate  Outcome: Progressing     Problem: INFECTION - ADULT  Goal: Absence or prevention of progression during hospitalization  Description: INTERVENTIONS:  - Assess and monitor for signs and symptoms of infection  - Monitor lab/diagnostic results  - Monitor all insertion sites, i.e. indwelling lines, tubes, and drains  - Monitor endotracheal if appropriate and nasal secretions for changes in amount and color  - [place] appropriate cooling/warming therapies per order  - Administer medications as ordered  - Instruct and encourage  patient and family to use good hand hygiene technique  - Identify and instruct in appropriate isolation precautions for identified infection/condition  Outcome: Progressing

## 2024-10-25 NOTE — PROGRESS NOTES
Progress Note - Hospitalist   Name: Royce Rene 78 y.o. male I MRN: [id number]  Unit/Bed#: -01 I Date of Admission: 10/22/2024   Date of Service: 10/25/2024 I Hospital Day: 3    Assessment & Plan  Osteomyelitis (Formerly [facility])  Patient presented with worsening wound on right great toe since recently hitting it against a refrigerator. Hx chronic LE wounds with poor wound healing.  XR R foot: Irregularity of the soft tissues of the great toe as well as a small focus of lucency in the distal aspect of the first distal phalanx suspicious for osteomyelitis   Did not meet sepsis criteria on admission  Wound cultures initially with no growth, however now revealing 2+ growth of Pseudomonas aeruginosa  Podiatry following, maintaining on IV vancomycin & await angiogram prior to considering toe amputation  Resume IV cefepime with Pseudomonas on wound cultures & continue IV vancomycin - await possible surgical interventions post angiogram  WBAT in post-op shoe. Wound care per nursing.  PAD (peripheral artery disease) (Formerly [facility])  History of PAD s/p prior stenting  HEATHER duplex:   RLE: High-grade stenosis versus occlusion in the proximal, mid and distal superficial femoral artery with reconstitution distally.   LLE: High-grade stenosis versus occlusion in the proximal and mid superficial femoral artery with reconstitution distally.  Vascular surgery following, patient needs IR angiogram of  RLE prior to any surgical interventions   Plan for eventual transfer to [facility] over the weekend or Monday - IR unable to perform angiogram at [facility] due to complexity, tentatively plan for angiogram at [place] on 10/30 or 10/31  Continue DAPT with Plavix/aspirin, statin  Chronic combined systolic and diastolic CHF (congestive heart failure) (Formerly [facility])  Wt Readings from Last 3 Encounters:   10/25/24 94.1 kg (207 lb 6.4 oz)   10/09/24 100 kg (221 lb 5.5 oz)   09/25/24 99.8 kg (220 lb)     Currently euvolemic on exam, no acute exacerbation  Echo  (8/2/23): EF 30-35%. There is mid anteroseptal, mid inferoseptal, apical septal, apical lateral, apical inferior, apical anterior and apical akinesis with wall thinning, suggestive of aneurysmal changes. Grade 1 diastolic dysfunction. TAVR bioprosthetic valve.  Continue Lasix 20 mg daily  Cardiac diet, daily weights, I&Os  CAD (coronary artery disease)  Denies any chest pain  Continue ASA, statin, BB therapy  Moderate vascular dementia (HCC)  Patient with history of vascular dementia. Reports that he lives in an apartment complex with his son.  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 Hernandez for Oncology outpatient     VTE Pharmacologic Prophylaxis: VTE Score: 5 High Risk (Score >/= 5) - Pharmacological DVT Prophylaxis Ordered: enoxaparin (Lovenox). Sequential Compression Devices Ordered.    Mobility:   Basic Mobility Inpatient Raw Score: 16  JH-HLM Goal: 5: Stand one or more mins  JH-HLM Achieved: 6: Walk 10 steps or more  JH-HLM Goal achieved. Continue to encourage appropriate mobility.    Patient Centered Rounds: I performed bedside rounds with nursing staff today.   Discussions with Specialists or Other Care Team Provider: Vascular surgery, case management    Education and Discussions with Family / Patient: Attempted to update  (son) via phone. Left voicemail.     Current Length of Stay: 3 day(s)  Current Patient Status: Inpatient   Certification Statement: The patient will continue to require additional inpatient hospital stay due to IV antibiotics, angiogram, possible toe amputation  Discharge Plan: Anticipate discharge in >72 hrs to discharge location to be determined pending rehab evaluations.    Code Status: Level 1 - Full Code    Subjective   Patient is seen at bedside this a.m., reports pain in right foot is  relieved with current medications.  Patient states he is agreeable to remain inpatient awaiting transfer either over the weekend or next week for angiogram at [place].    Objective :  Temp:  [98 °F (36.7 °C)-98.2 °F (36.8 °C)] 98.2 °F (36.8 °C)  HR:  [78-80] 80  BP: ()/(56-88) 156/88  Resp:  [20] 20  SpO2:  [94 %-96 %] 96 %    Body mass index is 32.48 kg/m².     Input and Output Summary (last 24 hours):     Intake/Output Summary (Last 24 hours) at 10/25/2024 1413  Last data filed at 10/25/2024 0501  Gross per 24 hour   Intake 400 ml   Output 600 ml   Net -200 ml       Physical Exam  Constitutional:       General: He is not in acute distress.     Appearance: He is obese. He is not ill-appearing, toxic-appearing or diaphoretic.   Cardiovascular:      Rate and Rhythm: Normal rate and regular rhythm.      Pulses: Normal pulses.      Heart sounds: Normal heart sounds.   Pulmonary:      Effort: Pulmonary effort is normal. No respiratory distress.      Breath sounds: Normal breath sounds.   Abdominal:      General: Bowel sounds are normal. There is no distension.      Palpations: Abdomen is soft.      Tenderness: There is no abdominal tenderness.   Musculoskeletal:         General: No swelling or tenderness.   Skin:     General: Skin is warm.      Comments: Necrosis/eschar noted on right great toe, wound dry with no purulent discharge or surrounding erythema/edema.   Neurological:      General: No focal deficit present.      Mental Status: He is alert.   Psychiatric:         Mood and Affect: Mood normal.         Behavior: Behavior normal.           Lines/Drains:      Central Line:  Goal for removal: N/A - Chronic PICC               Lab Results: I have reviewed the following results:   Results from last 7 days   Lab Units 10/25/24  0507 10/23/24  0543 10/22/24  1536   WBC Thousand/uL 8.22   < > 9.52   HEMOGLOBIN g/dL 11.8*   < > 12.1   HEMATOCRIT % 36.8   < > 37.8   PLATELETS Thousands/uL 163   < > 169   BANDS PCT %   --   --  3   LYMPHO PCT %  --   --  9*   MONO PCT %  --   --  6   EOS PCT %  --   --  1    < > = values in this interval not displayed.     Results from last 7 days   Lab Units 10/25/24  0507 10/24/24  0527 10/23/24  0543   SODIUM mmol/L 141   < > 140   POTASSIUM mmol/L 4.2   < > 3.6   CHLORIDE mmol/L 109*   < > 110*   CO2 mmol/L 25   < > 22   BUN mg/dL 20   < > 25   CREATININE mg/dL 0.75   < > 0.74   ANION GAP mmol/L 7   < > 8   CALCIUM mg/dL 8.7   < > 7.9*   ALBUMIN g/dL  --   --  3.6   TOTAL BILIRUBIN mg/dL  --   --  0.80   ALK PHOS U/L  --   --  58   ALT U/L  --   --  18   AST U/L  --   --  11*   GLUCOSE RANDOM mg/dL 84   < > 85    < > = values in this interval not displayed.         Results from last 7 days   Lab Units 10/25/24  1117 10/25/24  0703 10/24/24  2042 10/24/24  1545 10/24/24  1137 10/24/24  0705 10/23/24  2036 10/23/24  1600 10/23/24  1056 10/23/24  0718 10/22/24  2032 10/22/24  1746   POC GLUCOSE mg/dl 143* 89 120 115 97 80 104 92 97 94 99 85     Results from last 7 days   Lab Units 10/23/24  0543   HEMOGLOBIN A1C % 5.5     Results from last 7 days   Lab Units 10/22/24  1536   PROCALCITONIN ng/ml 0.07       Recent Cultures (last 7 days):   Results from last 7 days   Lab Units 10/23/24  0931   GRAM STAIN RESULT  No Polys or Bacteria seen   WOUND CULTURE  2+ Growth of Pseudomonas aeruginosa*       Imaging Results Review: I reviewed radiology reports from this admission including: xray(s) and Ultrasound(s).  Other Study Results Review: No additional pertinent studies reviewed.    Last 24 Hours Medication List:     Current Facility-Administered Medications:     acetaminophen (TYLENOL) tablet 650 mg, Q6H PRN    aspirin chewable tablet 81 mg, Daily    atorvastatin (LIPITOR) tablet 80 mg, Daily With Dinner    bismuth subsalicylate (PEPTO BISMOL) oral suspension 524 mg, Q6H PRN    cefepime (MAXIPIME) IVPB (premix in dextrose) 2,000 mg 50 mL, Q12H    clopidogrel (PLAVIX) tablet 75 mg, Daily    enoxaparin  (LOVENOX) subcutaneous injection 40 mg, Q24H DEWAYNE    ezetimibe (ZETIA) tablet 10 mg, Daily    furosemide (LASIX) tablet 20 mg, Daily    gabapentin (NEURONTIN) capsule 600 mg, BID    insulin lispro (HumALOG/ADMELOG) 100 units/mL subcutaneous injection 1-5 Units, TID AC **AND** Fingerstick Glucose (POCT), TID AC    losartan (COZAAR) tablet 12.5 mg, Daily    metoprolol succinate (TOPROL-XL) 24 hr tablet 25 mg, Daily    oxyCODONE (ROXICODONE) IR tablet 5 mg, Q4H PRN    potassium chloride (Klor-Con M20) CR tablet 20 mEq, Daily    predniSONE tablet 5 mg, BID With Meals    vancomycin (VANCOCIN) IVPB (premix in dextrose) 1,000 mg 200 mL, Q12H, Last Rate: 1,000 mg (10/25/24 1020)    Administrative Statements   Today, Patient Was Seen By: Carol Mccord PA-C  I have spent a total time of 45 minutes in caring for this patient on the day of the visit/encounter including Documenting in the medical record, Reviewing / ordering tests, medicine, procedures  , Obtaining or reviewing history  , and Communicating with other healthcare professionals .    **Please Note: This note may have been constructed using a voice recognition system.**

## 2024-10-25 NOTE — ASSESSMENT & PLAN NOTE
Wt Readings from Last 3 Encounters:   10/25/24 94.1 kg (207 lb 6.4 oz)   10/09/24 100 kg (221 lb 5.5 oz)   09/25/24 99.8 kg (220 lb)     Currently euvolemic on exam, no acute exacerbation  Echo (8/2/23): EF 30-35%. There is mid anteroseptal, mid inferoseptal, apical septal, apical lateral, apical inferior, apical anterior and apical akinesis with wall thinning, suggestive of aneurysmal changes. Grade 1 diastolic dysfunction. TAVR bioprosthetic valve.  Continue Lasix 20 mg daily  Cardiac diet, daily weights, I&Os

## 2024-10-25 NOTE — PROGRESS NOTES
Royce Rene is a 78 y.o. male who is currently ordered Vancomycin IV with management by the Pharmacy Consult service.  Relevant clinical data and objective / subjective history reviewed.  Vancomycin Assessment:  Indication and Goal AUC/Trough: Soft tissue (goal -600, trough >10), -600, trough >10  Clinical Status: stable  Micro:     Renal Function:  SCr: 0.87 mg/dL  CrCl: 76.9 mL/min  Renal replacement: Not on dialysis  Days of Therapy: 4  Current Dose: 1000 mg IV q12h  Vancomycin Plan:  Continue Current Dosin mg IV q12h  Estimated AUC: 505 mcg*hr/mL  Estimated Trough: 15.1 mcg/mL  Next Level: 10/30/24 with AM labs  Renal Function Monitoring: Daily BMP and UOP  Pharmacy will continue to follow closely for s/sx of nephrotoxicity, infusion reactions and appropriateness of therapy.  BMP and CBC will be ordered per protocol. We will continue to follow the patient’s culture results and clinical progress daily.    Clementina Benitez, Pharmacist

## 2024-10-25 NOTE — PHYSICAL THERAPY NOTE
PHYSICAL THERAPY Treatment  DATE: 10/25/24  TIME: 2636-3549    NAME:  Royce Rene  AGE:   78 y.o.  Mrn:   [id number]  Length Of Stay: 3    ADMIT DX:  Wound infection [T14.8XXA, L08.9]  PAD (peripheral artery disease) ([facility]) [I73.9]  Wound of foot [S91.309A]    Past Medical History:   Diagnosis Date    Cancer ([facility]) 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    Coronary artery disease 2001    S/p stenting to circumflex and RCA    HFrEF (heart failure with reduced ejection fraction) ([facility]) 06/14/2021    High cholesterol     Prostate cancer ([facility])      Past Surgical History:   Procedure Laterality Date    CARDIAC ELECTROPHYSIOLOGY PROCEDURE N/A 12/23/2021    Procedure: Implant ICD - bi ventricular;  Surgeon: Jonas Oviedo MD;  Location: [facility] CARD[facility]AC [facility];  Service: Cardiology    CARDIAC SURGERY      IR LOWER EXTREMITY ANGIOGRAM  4/18/2023    DC TEAEC W/WO PATCH GRAFT COMMON FEMORAL Right 7/9/2021    Procedure: ENDARTERECTOMY ARTERIAL FEMORAL;  Surgeon: Serina Cook DO;  Location: [facility];  Service: Vascular        10/25/24 1519   PT Last Visit   PT Visit Date 10/25/24   Note Type   Note Type Treatment   Pain Assessment   Pain Assessment Tool 0-10   Pain Score 6   Pain Location/Orientation Orientation: Right;Location: Foot   Hospital Pain Intervention(s)   (manage non-wbing)   Restrictions/Precautions   Weight Bearing Precautions Per Order Yes   RLE Weight Bearing Per Order WBAT   Other Precautions Fall Risk;Pain;Bed Alarm;WBS   General   Chart Reviewed Yes   Family/Caregiver Present No   Cognition   Overall Cognitive Status WFL   Arousal/Participation Alert   Orientation Level Oriented X4   Subjective   Subjective Pt pleasant and agreeable to participate   Bed Mobility   Supine to Sit 6  Modified independent   Transfers   Sit to Stand 4  Minimal assistance   Additional items Assist x 1;Bedrails;Increased time required;Verbal cues  (anahi hand rails.  performed 6x)   Stand to Sit 5  Supervision   Additional items Assist x  1;Bedrails;Verbal cues   Stand pivot 4  Minimal assistance   Additional items Assist x 1;Bedrails;Armrests;Increased time required;Impulsive;Verbal cues  (performed bed<>w/c)   Wheelchair Activities   Propulsion Yes   Propulsion Type 1 Manual   Level 1 Level tile   Method 1 Right upper extremity;Left upper extremity;Left lower extremity   Level of Assistance 1 Close supervision   Description/ Details 1 self propelled w/c 75', before reported shoulder fatigue/pain   Balance   Static Sitting Good   Static Standing Fair -  (standing at EOB with use of bed rails, maintaining RLE NWB for pain relief)   Endurance Deficit   Endurance Deficit Yes   Activity Tolerance   Activity Tolerance Patient limited by pain   Assessment   Prognosis Fair   Problem List Decreased strength;Decreased endurance;Impaired balance;Decreased mobility;Impaired judgement;Decreased safety awareness;Decreased cognition;Pain;Orthopedic restrictions   Assessment Pt exhibits physical deficits noted in problem list above.  Deficits listed contribute to functional limitations that are significant from the patient PLOF and include: impaired standing balance, inability to perform safe transfers, tolerance for OOB functional activities, unsafe/inefficient ambulation, and fall risk    Additionally, patient is limited by restrictions/precautions/DME: pt currently RLE WBAT.  However, expected future amputation on right foot due to osteomyelitis.     Additional considerations affecting pt's discharge planning: Insufficient or unavailable assistance at home, Unfavorable, dangerous, or deplorable home environment, Progressive cognitive decline affecting safety awareness/insight, Inability to manage basic self care ADLs with the assistance that is currently available, and Inability to perform necessary transfers and/or mobility out of bed, with the assistance that is currently available    During today's session, discussed expectations and PLOF.  Discussed  potential for changes in weight bearing status and means/techniques to manage mobility and self care activities.  Initiated sit<>stand transfer with anahi bed rail, while practicing RLE NWB.  Pt able to improve transfer control and stability in standing.  Educated and instructed pt on stand pivot transfer from bed<>w/c. Pt able to initiate transfer appropropriately, but displayed poor control and safety when pivoting hips and getting to sitting. Initiated manual w/c training, which pt responded well to, but shoulders fatigued and became painful with time.  Pt exhibits good understanding and focus on practicing transfers and eliminating wbing through RLE in the event wbing status changes.     The AM-PAC Mobility Score was used to assist in determining pt safety w/ mobility/self care & appropriate d/c recommendations, see above for scores. Patient's clinical presentation is evolving due to significant acute change in functional independence, uncontrolled pain, significant need for care assistance compared to baseline, ongoing medical management needs, and complicated social/support system.     From a PT/mobility standpoint given the above findings, DC recommendation is level: III (Minimum Rehab Resource Intensity). Pt very likely may need II (moderate rehab intensity) if wbing restrictions are ordered.  Considering the patient's PLOF, co-morbidities, acute functional limitations, functional outcome measures, and/or goal to progress functional independence; this patient would continue to benefit from skilled Physical Therapy intervention in the acute care setting.    Barriers to Discharge Decreased caregiver support;Inaccessible home environment   Goals   STG Expiration Date 11/02/24   Short Term Goal #1 1: Pt will perform rolling to either side in flat bed, independently. 2: Pt will perform sit<>supine on flat bed, independently. 3: Pt will perform stand pivot transfer with RW while managing wbing restrictions, sup A.  4: Pt will ambulate 50'x2 with RW while managing wbing restrictions, sup A. 5: Pt will perform written HEP, with cues. 6: Pt will ascend/descend stairs necessary to access his living spaces, while managing wbing restrictions, sup A.   Plan   Treatment/Interventions Functional transfer training;LE strengthening/ROM;Elevations;Therapeutic exercise;Endurance training;Patient/family training;Equipment eval/education;Bed mobility;Gait training;Compensatory technique education   PT Frequency 2-3x/wk   Discharge Recommendation   Rehab Resource Intensity Level, PT III (Minimum Resource Intensity)   AM-PAC Basic Mobility Inpatient   Turning in Flat Bed Without Bedrails 4   Lying on Back to Sitting on Edge of Flat Bed Without Bedrails 4   Moving Bed to Chair 3   Standing Up From Chair Using Arms 3   Walk in Room 3   Climb 3-5 Stairs With Railing 1   Basic Mobility Inpatient Raw Score 18   Basic Mobility Standardized Score 41.05   [facility] Highest Level Of Mobility   JH-HLM Goal 6: Walk 10 steps or more   JH-HLM Achieved 4: Move to chair/commode   Education   Education Provided Mobility training;Assistive device   Patient Demonstrates verbal understanding;Demonstrates acceptance/verbal understanding   End of Consult   Patient Position at End of Consult Seated edge of bed;All needs within reach     The patient's AM-PAC Basic Mobility Inpatient Short Form Raw Score is 18. A Raw score of greater than or equal to 16 suggests the patient may benefit from discharge to home. Please also refer to the recommendation of the Physical Therapist for safe discharge planning.    Nish Lind, PT, DPT  [place] Licensure #[id number]

## 2024-10-25 NOTE — PLAN OF CARE
Problem: PHYSICAL THERAPY ADULT  Goal: Performs mobility at highest level of function for planned discharge setting.  See evaluation for individualized goals.  Description: Treatment/Interventions: Functional transfer training, LE strengthening/ROM, Elevations, Therapeutic exercise, Endurance training, Patient/family training, Equipment eval/education, Bed mobility, Gait training, Compensatory technique education          See flowsheet documentation for full assessment, interventions and recommendations.  Outcome: Progressing  Note: Prognosis: Fair  Problem List: Decreased strength, Decreased endurance, Impaired balance, Decreased mobility, Impaired judgement, Decreased safety awareness, Decreased cognition, Pain, Orthopedic restrictions  Assessment: Pt exhibits physical deficits noted in problem list above.  Deficits listed contribute to functional limitations that are significant from the patient PLOF and include: impaired standing balance, inability to perform safe transfers, tolerance for OOB functional activities, unsafe/inefficient ambulation, and fall risk    Additionally, patient is limited by restrictions/precautions/DME: pt currently RLE WBAT.  However, expected future amputation on right foot due to osteomyelitis.     Additional considerations affecting pt's discharge planning: Insufficient or unavailable assistance at home, Unfavorable, dangerous, or deplorable home environment, Progressive cognitive decline affecting safety awareness/insight, Inability to manage basic self care ADLs with the assistance that is currently available, and Inability to perform necessary transfers and/or mobility out of bed, with the assistance that is currently available    During today's session, discussed expectations and PLOF.  Discussed potential for changes in weight bearing status and means/techniques to manage mobility and self care activities.  Initiated sit<>stand transfer with anahi bed rail, while practicing RLE  NWB.  Pt able to improve transfer control and stability in standing.  Educated and instructed pt on stand pivot transfer from bed<>w/c. Pt able to initiate transfer appropropriately, but displayed poor control and safety when pivoting hips and getting to sitting. Initiated manual w/c training, which pt responded well to, but shoulders fatigued and became painful with time.  Pt exhibits good understanding and focus on practicing transfers and eliminating wbing through RLE in the event wbing status changes.     The AM-PAC Mobility Score was used to assist in determining pt safety w/ mobility/self care & appropriate d/c recommendations, see above for scores. Patient's clinical presentation is evolving due to significant acute change in functional independence, uncontrolled pain, significant need for care assistance compared to baseline, ongoing medical management needs, and complicated social/support system.     From a PT/mobility standpoint given the above findings, DC recommendation is level: III (Minimum Rehab Resource Intensity). Pt very likely may need II (moderate rehab intensity) if wbing restrictions are ordered.  Barriers to Discharge: Decreased caregiver support, Inaccessible home environment     Rehab Resource Intensity Level, PT: III (Minimum Resource Intensity)    See flowsheet documentation for full assessment.

## 2024-10-26 LAB
ALBUMIN SERPL BCG-MCNC: 3.6 G/DL (ref 3.5–5)
ALP SERPL-CCNC: 63 U/L (ref 34–104)
ALT SERPL W P-5'-P-CCNC: 24 U/L (ref 7–52)
ANION GAP SERPL CALCULATED.3IONS-SCNC: 8 MMOL/L (ref 4–13)
ANISOCYTOSIS BLD QL SMEAR: PRESENT
AST SERPL W P-5'-P-CCNC: 15 U/L (ref 13–39)
BACTERIA WND AEROBE CULT: ABNORMAL
BACTERIA WND AEROBE CULT: ABNORMAL
BASOPHILS # BLD MANUAL: 0 THOUSAND/UL (ref 0–0.1)
BASOPHILS NFR MAR MANUAL: 0 % (ref 0–1)
BILIRUB SERPL-MCNC: 0.86 MG/DL (ref 0.2–1)
BUN SERPL-MCNC: 21 MG/DL (ref 5–25)
CALCIUM SERPL-MCNC: 8.5 MG/DL (ref 8.4–10.2)
CHLORIDE SERPL-SCNC: 108 MMOL/L (ref 96–108)
CO2 SERPL-SCNC: 23 MMOL/L (ref 21–32)
CREAT SERPL-MCNC: 0.73 MG/DL (ref 0.6–1.3)
EOSINOPHIL # BLD MANUAL: 0 THOUSAND/UL (ref 0–0.4)
EOSINOPHIL NFR BLD MANUAL: 0 % (ref 0–6)
ERYTHROCYTE [DISTWIDTH] IN BLOOD BY AUTOMATED COUNT: 15.4 % (ref 11.6–15.1)
GFR SERPL CREATININE-BSD FRML MDRD: 88 ML/MIN/1.73SQ M
GLUCOSE SERPL-MCNC: 107 MG/DL (ref 65–140)
GLUCOSE SERPL-MCNC: 122 MG/DL (ref 65–140)
GLUCOSE SERPL-MCNC: 127 MG/DL (ref 65–140)
GLUCOSE SERPL-MCNC: 84 MG/DL (ref 65–140)
GLUCOSE SERPL-MCNC: 86 MG/DL (ref 65–140)
GRAM STN SPEC: ABNORMAL
HCT VFR BLD AUTO: 35.2 % (ref 36.5–49.3)
HGB BLD-MCNC: 11.3 G/DL (ref 12–17)
LYMPHOCYTES # BLD AUTO: 0.99 THOUSAND/UL (ref 0.6–4.47)
LYMPHOCYTES # BLD AUTO: 12 % (ref 14–44)
MCH RBC QN AUTO: 30.9 PG (ref 26.8–34.3)
MCHC RBC AUTO-ENTMCNC: 32.1 G/DL (ref 31.4–37.4)
MCV RBC AUTO: 96 FL (ref 82–98)
MONOCYTES # BLD AUTO: 0.53 THOUSAND/UL (ref 0–1.22)
MONOCYTES NFR BLD: 7 % (ref 4–12)
NEUTROPHILS # BLD MANUAL: 6.11 THOUSAND/UL (ref 1.85–7.62)
NEUTS BAND NFR BLD MANUAL: 1 % (ref 0–8)
NEUTS SEG NFR BLD AUTO: 79 % (ref 43–75)
OVALOCYTES BLD QL SMEAR: PRESENT
PLATELET # BLD AUTO: 152 THOUSANDS/UL (ref 149–390)
PLATELET BLD QL SMEAR: ADEQUATE
PMV BLD AUTO: 9.8 FL (ref 8.9–12.7)
POTASSIUM SERPL-SCNC: 3.9 MMOL/L (ref 3.5–5.3)
PROT SERPL-MCNC: 5.5 G/DL (ref 6.4–8.4)
RBC # BLD AUTO: 3.66 MILLION/UL (ref 3.88–5.62)
RBC MORPH BLD: PRESENT
SODIUM SERPL-SCNC: 139 MMOL/L (ref 135–147)
VARIANT LYMPHS # BLD AUTO: 1 %
WBC # BLD AUTO: 7.64 THOUSAND/UL (ref 4.31–10.16)

## 2024-10-26 PROCEDURE — 99232 SBSQ HOSP IP/OBS MODERATE 35: CPT | Performed by: INTERNAL MEDICINE

## 2024-10-26 PROCEDURE — 80053 COMPREHEN METABOLIC PANEL: CPT | Performed by: PHYSICIAN ASSISTANT

## 2024-10-26 PROCEDURE — 82948 REAGENT STRIP/BLOOD GLUCOSE: CPT

## 2024-10-26 PROCEDURE — 85007 BL SMEAR W/DIFF WBC COUNT: CPT | Performed by: PHYSICIAN ASSISTANT

## 2024-10-26 PROCEDURE — 85027 COMPLETE CBC AUTOMATED: CPT | Performed by: PHYSICIAN ASSISTANT

## 2024-10-26 RX ADMIN — CEFEPIME HYDROCHLORIDE 2000 MG: 2 INJECTION, SOLUTION INTRAVENOUS at 01:53

## 2024-10-26 RX ADMIN — LOSARTAN POTASSIUM 12.5 MG: 25 TABLET, FILM COATED ORAL at 09:09

## 2024-10-26 RX ADMIN — GABAPENTIN 600 MG: 300 CAPSULE ORAL at 17:14

## 2024-10-26 RX ADMIN — GABAPENTIN 600 MG: 300 CAPSULE ORAL at 09:09

## 2024-10-26 RX ADMIN — POTASSIUM CHLORIDE 20 MEQ: 1500 TABLET, EXTENDED RELEASE ORAL at 09:10

## 2024-10-26 RX ADMIN — ATORVASTATIN CALCIUM 80 MG: 40 TABLET, FILM COATED ORAL at 17:14

## 2024-10-26 RX ADMIN — METOPROLOL SUCCINATE 25 MG: 25 TABLET, EXTENDED RELEASE ORAL at 09:11

## 2024-10-26 RX ADMIN — CLOPIDOGREL 75 MG: 75 TABLET ORAL at 09:10

## 2024-10-26 RX ADMIN — ASPIRIN 81 MG 81 MG: 81 TABLET ORAL at 09:10

## 2024-10-26 RX ADMIN — CEFEPIME HYDROCHLORIDE 2000 MG: 2 INJECTION, SOLUTION INTRAVENOUS at 14:52

## 2024-10-26 RX ADMIN — ENOXAPARIN SODIUM 40 MG: 40 INJECTION SUBCUTANEOUS at 09:10

## 2024-10-26 RX ADMIN — OXYCODONE HYDROCHLORIDE 5 MG: 5 TABLET ORAL at 09:09

## 2024-10-26 RX ADMIN — VANCOMYCIN HYDROCHLORIDE 1000 MG: 1 INJECTION, SOLUTION INTRAVENOUS at 20:24

## 2024-10-26 RX ADMIN — VANCOMYCIN HYDROCHLORIDE 1000 MG: 1 INJECTION, SOLUTION INTRAVENOUS at 09:10

## 2024-10-26 RX ADMIN — PREDNISONE 5 MG: 10 TABLET ORAL at 09:09

## 2024-10-26 RX ADMIN — FUROSEMIDE 20 MG: 20 TABLET ORAL at 09:10

## 2024-10-26 RX ADMIN — PREDNISONE 5 MG: 10 TABLET ORAL at 17:14

## 2024-10-26 RX ADMIN — EZETIMIBE 10 MG: 10 TABLET ORAL at 09:10

## 2024-10-26 RX ADMIN — OXYCODONE HYDROCHLORIDE 5 MG: 5 TABLET ORAL at 17:18

## 2024-10-26 NOTE — PLAN OF CARE
Problem: SAFETY ADULT  Goal: Patient will remain free of falls  Description: INTERVENTIONS:  - Educate patient/family on patient safety including physical limitations  - Instruct patient to call for assistance with activity   - Consult OT/PT to assist with strengthening/mobility   - Keep Call bell within reach  - Keep bed low and locked with side rails adjusted as appropriate  - Keep care items and personal belongings within reach  - Initiate and maintain comfort rounds  - Offer Toileting every 2 Hours, in advance of need  - Initiate/Maintain bed alarm  - Obtain necessary fall risk management equipment: walker  - Apply yellow socks and bracelet for high fall risk patients  - Consider moving patient to room near nurses station  Outcome: Progressing     Problem: Knowledge Deficit  Goal: Patient/family/caregiver demonstrates understanding of disease process, treatment plan, medications, and discharge instructions  Description: Complete learning assessment and assess knowledge base.  Interventions:  - Provide teaching at level of understanding  - Provide teaching via preferred learning methods  Outcome: Progressing

## 2024-10-26 NOTE — PROGRESS NOTES
Progress Note - Hospitalist   Name: Royce Rene 78 y.o. male I MRN: [id number]  Unit/Bed#: -01 I Date of Admission: 10/22/2024   Date of Service: 10/26/2024 I Hospital Day: 4    Assessment & Plan  Osteomyelitis ([facility])  Patient presented with worsening wound on right great toe since recently hitting it against a refrigerator. Hx chronic LE wounds with poor wound healing.  XR R foot: Irregularity of the soft tissues of the great toe as well as a small focus of lucency in the distal aspect of the first distal phalanx suspicious for osteomyelitis   Did not meet sepsis criteria on admission  Wound cultures initially with no growth, however now revealing 2+ growth of Pseudomonas aeruginosa  Podiatry following, maintaining on IV vancomycin & await angiogram prior to considering toe amputation  Resume IV cefepime with Pseudomonas on wound cultures & continue IV vancomycin - await possible surgical interventions post angiogram  WBAT in post-op shoe. Wound care per nursing.  PAD (peripheral artery disease) ([facility])  History of PAD s/p prior stenting  HEATHER duplex:   RLE: High-grade stenosis versus occlusion in the proximal, mid and distal superficial femoral artery with reconstitution distally.   LLE: High-grade stenosis versus occlusion in the proximal and mid superficial femoral artery with reconstitution distally.  Vascular surgery following, patient needs IR angiogram of  RLE prior to any surgical interventions   Plan for eventual transfer to [facility] over the weekend or Monday - IR unable to perform angiogram at [facility] due to complexity, tentatively plan for angiogram at [place] on 10/30 or 10/31  Continue DAPT with Plavix/aspirin, statin  Chronic combined systolic and diastolic CHF (congestive heart failure) ([facility])  Wt Readings from Last 3 Encounters:   10/26/24 98.9 kg (218 lb)   10/09/24 100 kg (221 lb 5.5 oz)   09/25/24 99.8 kg (220 lb)     Currently euvolemic on exam, no acute exacerbation  Echo (8/2/23): EF  30-35%. There is mid anteroseptal, mid inferoseptal, apical septal, apical lateral, apical inferior, apical anterior and apical akinesis with wall thinning, suggestive of aneurysmal changes. Grade 1 diastolic dysfunction. TAVR bioprosthetic valve.  Continue Lasix 20 mg daily  Cardiac diet, daily weights, I&Os  CAD (coronary artery disease)  Denies any chest pain  Continue ASA, statin, BB therapy  Moderate vascular dementia (HCC)  Patient with history of vascular dementia. Reports that he lives in an apartment complex with his son.  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 Hernandez for Oncology outpatient     VTE Pharmacologic Prophylaxis: VTE Score: 5 High Risk (Score >/= 5) - Pharmacological DVT Prophylaxis Ordered: enoxaparin (Lovenox). Sequential Compression Devices Ordered.    Mobility:   Basic Mobility Inpatient Raw Score: 18  JH-HLM Goal: 6: Walk 10 steps or more  JH-HLM Achieved: 4: Move to chair/commode  JH-HLM Goal achieved. Continue to encourage appropriate mobility.    Patient Centered Rounds: I performed bedside rounds with nursing staff today.   Discussions with Specialists or Other Care Team Provider:     Education and Discussions with Family / Patient: Attempted to update  (son) via phone. Unable to contact.    Current Length of Stay: 4 day(s)  Current Patient Status: Inpatient   Certification Statement: The patient will continue to require additional inpatient hospital stay due to pending angio/transfer  Discharge Plan: Anticipate discharge in 48-72 hrs to discharge location to be determined pending rehab evaluations.    Code Status: Level 1 - Full Code    Subjective   Patient reports stable pain in his feet, denies any [place] complaints    Objective :  Temp:  [98 °F (36.7 °C)-98.4 °F (36.9 °C)] 98.3 °F  (36.8 °C)  HR:  [83-90] 90  BP: (113-153)/(64-80) 153/64  Resp:  [16-20] 20  SpO2:  [94 %-96 %] 96 %  O2 Device: None (Room air)    Body mass index is 34.14 kg/m².     Input and Output Summary (last 24 hours):     Intake/Output Summary (Last 24 hours) at 10/26/2024 1002  Last data filed at 10/26/2024 0700  Gross per 24 hour   Intake 200 ml   Output 1720 ml   Net -1520 ml       Physical Exam  Vitals and nursing note reviewed.   Constitutional:       General: He is not in acute distress.     Appearance: He is well-developed. He is not toxic-appearing or diaphoretic.   HENT:      Head: Normocephalic and atraumatic.   Eyes:      General: No scleral icterus.     Conjunctiva/sclera: Conjunctivae normal.   Cardiovascular:      Rate and Rhythm: Normal rate and regular rhythm.      Heart sounds: No murmur heard.     No friction rub. No gallop.   Pulmonary:      Effort: Pulmonary effort is normal. No respiratory distress.      Breath sounds: Normal breath sounds. No stridor. No wheezing, rhonchi or rales.   Chest:      Chest wall: No tenderness.   Abdominal:      General: There is no distension.      Palpations: Abdomen is soft. There is no mass.      Tenderness: There is no abdominal tenderness. There is no guarding.      Hernia: No hernia is present.   Musculoskeletal:         General: No swelling or tenderness.      Cervical back: Neck supple.      Comments: Right great toe wound   Skin:     General: Skin is warm and dry.      Capillary Refill: Capillary refill takes less than 2 seconds.   Neurological:      Mental Status: He is alert.   Psychiatric:         Mood and Affect: Mood normal.           Lines/Drains:      Central Line:  Goal for removal: N/A - Chronic PICC               Lab Results: I have reviewed the following results:   Results from last 7 days   Lab Units 10/26/24  0453   WBC Thousand/uL 7.64   HEMOGLOBIN g/dL 11.3*   HEMATOCRIT % 35.2*   PLATELETS Thousands/uL 152   BANDS PCT % 1   LYMPHO PCT % 12*   MONO  PCT % 7   EOS PCT % 0     Results from last 7 days   Lab Units 10/26/24  0453   SODIUM mmol/L 139   POTASSIUM mmol/L 3.9   CHLORIDE mmol/L 108   CO2 mmol/L 23   BUN mg/dL 21   CREATININE mg/dL 0.73   ANION GAP mmol/L 8   CALCIUM mg/dL 8.5   ALBUMIN g/dL 3.6   TOTAL BILIRUBIN mg/dL 0.86   ALK PHOS U/L 63   ALT U/L 24   AST U/L 15   GLUCOSE RANDOM mg/dL 86         Results from last 7 days   Lab Units 10/26/24  0725 10/25/24  2112 10/25/24  1627 10/25/24  1117 10/25/24  0703 10/24/24  2042 10/24/24  1545 10/24/24  1137 10/24/24  0705 10/23/24  2036 10/23/24  1600 10/23/24  1056   POC GLUCOSE mg/dl 84 131 129 143* 89 120 115 97 80 104 92 97     Results from last 7 days   Lab Units 10/23/24  0543   HEMOGLOBIN A1C % 5.5     Results from last 7 days   Lab Units 10/22/24  1536   PROCALCITONIN ng/ml 0.07       Recent Cultures (last 7 days):   Results from last 7 days   Lab Units 10/23/24  0931   GRAM STAIN RESULT  No Polys or Bacteria seen   WOUND CULTURE  2+ Growth of Pseudomonas aeruginosa*  1+ Growth of       Imaging Results Review: No pertinent imaging studies reviewed.  Other Study Results Review: No additional pertinent studies reviewed.    Last 24 Hours Medication List:     Current Facility-Administered Medications:     acetaminophen (TYLENOL) tablet 650 mg, Q6H PRN    aspirin chewable tablet 81 mg, Daily    atorvastatin (LIPITOR) tablet 80 mg, Daily With Dinner    bismuth subsalicylate (PEPTO BISMOL) oral suspension 524 mg, Q6H PRN    cefepime (MAXIPIME) IVPB (premix in dextrose) 2,000 mg 50 mL, Q12H, Last Rate: 2,000 mg (10/26/24 0153)    clopidogrel (PLAVIX) tablet 75 mg, Daily    enoxaparin (LOVENOX) subcutaneous injection 40 mg, Q24H DEWAYNE    ezetimibe (ZETIA) tablet 10 mg, Daily    furosemide (LASIX) tablet 20 mg, Daily    gabapentin (NEURONTIN) capsule 600 mg, BID    insulin lispro (HumALOG/ADMELOG) 100 units/mL subcutaneous injection 1-5 Units, TID AC **AND** Fingerstick Glucose (POCT), TID AC    losartan  (COZAAR) tablet 12.5 mg, Daily    metoprolol succinate (TOPROL-XL) 24 hr tablet 25 mg, Daily    oxyCODONE (ROXICODONE) IR tablet 5 mg, Q4H PRN    potassium chloride (Klor-Con M20) CR tablet 20 mEq, Daily    predniSONE tablet 5 mg, BID With Meals    vancomycin (VANCOCIN) IVPB (premix in dextrose) 1,000 mg 200 mL, Q12H, Last Rate: 1,000 mg (10/26/24 0910)    Administrative Statements   Today, Patient Was Seen By: Vini Oliver DO      **Please Note: This note may have been constructed using a voice recognition system.**

## 2024-10-26 NOTE — ASSESSMENT & PLAN NOTE
History of PAD s/p prior stenting  HEATHER duplex:   RLE: High-grade stenosis versus occlusion in the proximal, mid and distal superficial femoral artery with reconstitution distally.   LLE: High-grade stenosis versus occlusion in the proximal and mid superficial femoral artery with reconstitution distally.  Vascular surgery following, patient needs IR angiogram of  RLE prior to any surgical interventions   Plan for eventual transfer to [facility] over the weekend or Monday - IR unable to perform angiogram at [facility] due to complexity, tentatively plan for angiogram at [place] on 10/30 or 10/31  Continue DAPT with Plavix/aspirin, statin

## 2024-10-26 NOTE — PLAN OF CARE
Problem: PAIN - ADULT  Goal: Verbalizes/displays adequate comfort level or baseline comfort level  Description: Interventions:  - Encourage patient to monitor pain and request assistance  - Assess pain using appropriate pain scale  - Administer analgesics based on type and severity of pain and evaluate response  - Implement non-pharmacological measures as appropriate and evaluate response  - Consider cultural and social influences on pain and pain management  - Notify physician/advanced practitioner if interventions unsuccessful or patient reports new pain  Outcome: Progressing     Problem: INFECTION - ADULT  Goal: Absence or prevention of progression during hospitalization  Description: INTERVENTIONS:  - Assess and monitor for signs and symptoms of infection  - Monitor lab/diagnostic results  - Monitor all insertion sites, i.e. indwelling lines, tubes, and drains  - Monitor endotracheal if appropriate and nasal secretions for changes in amount and color  - [place] appropriate cooling/warming therapies per order  - Administer medications as ordered  - Instruct and encourage patient and family to use good hand hygiene technique  - Identify and instruct in appropriate isolation precautions for identified infection/condition  Outcome: Progressing     Problem: SAFETY ADULT  Goal: Patient will remain free of falls  Description: INTERVENTIONS:  - Educate patient/family on patient safety including physical limitations  - Instruct patient to call for assistance with activity   - Consult OT/PT to assist with strengthening/mobility   - Keep Call bell within reach  - Keep bed low and locked with side rails adjusted as appropriate  - Keep care items and personal belongings within reach  - Initiate and maintain comfort rounds  - Make Fall Risk Sign visible to staff  - Offer Toileting every 2 Hours, in advance of need  - Initiate/Maintain 24/7 alarm  - Obtain necessary fall risk management equipment: RW  - Apply yellow socks  and bracelet for high fall risk patients  - Consider moving patient to room near nurses station  Outcome: Progressing

## 2024-10-26 NOTE — ASSESSMENT & PLAN NOTE
Wt Readings from Last 3 Encounters:   10/26/24 98.9 kg (218 lb)   10/09/24 100 kg (221 lb 5.5 oz)   09/25/24 99.8 kg (220 lb)     Currently euvolemic on exam, no acute exacerbation  Echo (8/2/23): EF 30-35%. There is mid anteroseptal, mid inferoseptal, apical septal, apical lateral, apical inferior, apical anterior and apical akinesis with wall thinning, suggestive of aneurysmal changes. Grade 1 diastolic dysfunction. TAVR bioprosthetic valve.  Continue Lasix 20 mg daily  Cardiac diet, daily weights, I&Os

## 2024-10-26 NOTE — PROGRESS NOTES
Royce Rene is a 78 y.o. male who is currently ordered Vancomycin IV with management by the Pharmacy Consult service.  Relevant clinical data and objective / subjective history reviewed.  Vancomycin Assessment:  Indication and Goal AUC/Trough: Soft tissue (goal -600, trough >10), -600, trough >10  Clinical Status: stable  Micro:     Renal Function:  SCr:  0.84 mg/dL  CrCl:  80.8 mL/min  Renal replacement: Not on dialysis  Days of Therapy:  5  Current Dose: 1000mg IV q12h  Vancomycin Plan:  New Dosing: --  Estimated AUC:  488 mcg*hr/mL  Estimated Trough:  14.6 mcg/mL  Next Level:  10/30/2024 with AM labs  Renal Function Monitoring: Daily BMP and UOP  Pharmacy will continue to follow closely for s/sx of nephrotoxicity, infusion reactions and appropriateness of therapy.  BMP and CBC will be ordered per protocol. We will continue to follow the patient’s culture results and clinical progress daily.    Lisa Lyon, Pharmacist

## 2024-10-27 LAB
ANION GAP SERPL CALCULATED.3IONS-SCNC: 9 MMOL/L (ref 4–13)
BASOPHILS # BLD AUTO: 0.03 THOUSANDS/ÂΜL (ref 0–0.1)
BASOPHILS NFR BLD AUTO: 0 % (ref 0–1)
BUN SERPL-MCNC: 21 MG/DL (ref 5–25)
CALCIUM SERPL-MCNC: 8.5 MG/DL (ref 8.4–10.2)
CHLORIDE SERPL-SCNC: 107 MMOL/L (ref 96–108)
CO2 SERPL-SCNC: 21 MMOL/L (ref 21–32)
CREAT SERPL-MCNC: 0.72 MG/DL (ref 0.6–1.3)
EOSINOPHIL # BLD AUTO: 0.04 THOUSAND/ÂΜL (ref 0–0.61)
EOSINOPHIL NFR BLD AUTO: 0 % (ref 0–6)
ERYTHROCYTE [DISTWIDTH] IN BLOOD BY AUTOMATED COUNT: 15.2 % (ref 11.6–15.1)
GFR SERPL CREATININE-BSD FRML MDRD: 89 ML/MIN/1.73SQ M
GLUCOSE SERPL-MCNC: 108 MG/DL (ref 65–140)
GLUCOSE SERPL-MCNC: 139 MG/DL (ref 65–140)
GLUCOSE SERPL-MCNC: 139 MG/DL (ref 65–140)
GLUCOSE SERPL-MCNC: 89 MG/DL (ref 65–140)
GLUCOSE SERPL-MCNC: 92 MG/DL (ref 65–140)
HCT VFR BLD AUTO: 38.1 % (ref 36.5–49.3)
HGB BLD-MCNC: 12.1 G/DL (ref 12–17)
IMM GRANULOCYTES # BLD AUTO: 0.17 THOUSAND/UL (ref 0–0.2)
IMM GRANULOCYTES NFR BLD AUTO: 2 % (ref 0–2)
LYMPHOCYTES # BLD AUTO: 1.14 THOUSANDS/ÂΜL (ref 0.6–4.47)
LYMPHOCYTES NFR BLD AUTO: 12 % (ref 14–44)
MCH RBC QN AUTO: 30.9 PG (ref 26.8–34.3)
MCHC RBC AUTO-ENTMCNC: 31.8 G/DL (ref 31.4–37.4)
MCV RBC AUTO: 97 FL (ref 82–98)
MONOCYTES # BLD AUTO: 0.61 THOUSAND/ÂΜL (ref 0.17–1.22)
MONOCYTES NFR BLD AUTO: 7 % (ref 4–12)
NEUTROPHILS # BLD AUTO: 7.23 THOUSANDS/ÂΜL (ref 1.85–7.62)
NEUTS SEG NFR BLD AUTO: 79 % (ref 43–75)
NRBC BLD AUTO-RTO: 0 /100 WBCS
PLATELET # BLD AUTO: 169 THOUSANDS/UL (ref 149–390)
PMV BLD AUTO: 9.6 FL (ref 8.9–12.7)
POTASSIUM SERPL-SCNC: 4.6 MMOL/L (ref 3.5–5.3)
RBC # BLD AUTO: 3.92 MILLION/UL (ref 3.88–5.62)
SODIUM SERPL-SCNC: 137 MMOL/L (ref 135–147)
WBC # BLD AUTO: 9.22 THOUSAND/UL (ref 4.31–10.16)

## 2024-10-27 PROCEDURE — 82948 REAGENT STRIP/BLOOD GLUCOSE: CPT

## 2024-10-27 PROCEDURE — 80048 BASIC METABOLIC PNL TOTAL CA: CPT | Performed by: INTERNAL MEDICINE

## 2024-10-27 PROCEDURE — 99232 SBSQ HOSP IP/OBS MODERATE 35: CPT | Performed by: INTERNAL MEDICINE

## 2024-10-27 PROCEDURE — 85025 COMPLETE CBC W/AUTO DIFF WBC: CPT | Performed by: INTERNAL MEDICINE

## 2024-10-27 RX ADMIN — ASPIRIN 81 MG 81 MG: 81 TABLET ORAL at 08:17

## 2024-10-27 RX ADMIN — OXYCODONE HYDROCHLORIDE 5 MG: 5 TABLET ORAL at 06:44

## 2024-10-27 RX ADMIN — VANCOMYCIN HYDROCHLORIDE 1000 MG: 1 INJECTION, SOLUTION INTRAVENOUS at 20:10

## 2024-10-27 RX ADMIN — CEFEPIME HYDROCHLORIDE 2000 MG: 2 INJECTION, SOLUTION INTRAVENOUS at 15:23

## 2024-10-27 RX ADMIN — METOPROLOL SUCCINATE 25 MG: 25 TABLET, EXTENDED RELEASE ORAL at 08:17

## 2024-10-27 RX ADMIN — GABAPENTIN 600 MG: 300 CAPSULE ORAL at 08:17

## 2024-10-27 RX ADMIN — PREDNISONE 5 MG: 10 TABLET ORAL at 16:35

## 2024-10-27 RX ADMIN — CEFEPIME HYDROCHLORIDE 2000 MG: 2 INJECTION, SOLUTION INTRAVENOUS at 02:09

## 2024-10-27 RX ADMIN — EZETIMIBE 10 MG: 10 TABLET ORAL at 08:17

## 2024-10-27 RX ADMIN — OXYCODONE HYDROCHLORIDE 5 MG: 5 TABLET ORAL at 18:25

## 2024-10-27 RX ADMIN — PREDNISONE 5 MG: 10 TABLET ORAL at 08:16

## 2024-10-27 RX ADMIN — GABAPENTIN 600 MG: 300 CAPSULE ORAL at 16:35

## 2024-10-27 RX ADMIN — CLOPIDOGREL 75 MG: 75 TABLET ORAL at 08:16

## 2024-10-27 RX ADMIN — POTASSIUM CHLORIDE 20 MEQ: 1500 TABLET, EXTENDED RELEASE ORAL at 08:16

## 2024-10-27 RX ADMIN — ENOXAPARIN SODIUM 40 MG: 40 INJECTION SUBCUTANEOUS at 08:16

## 2024-10-27 RX ADMIN — FUROSEMIDE 20 MG: 20 TABLET ORAL at 08:17

## 2024-10-27 RX ADMIN — ATORVASTATIN CALCIUM 80 MG: 40 TABLET, FILM COATED ORAL at 16:35

## 2024-10-27 RX ADMIN — VANCOMYCIN HYDROCHLORIDE 1000 MG: 1 INJECTION, SOLUTION INTRAVENOUS at 08:17

## 2024-10-27 RX ADMIN — LOSARTAN POTASSIUM 12.5 MG: 25 TABLET, FILM COATED ORAL at 08:17

## 2024-10-27 NOTE — ASSESSMENT & PLAN NOTE
Wt Readings from Last 3 Encounters:   10/27/24 98.5 kg (217 lb 3.2 oz)   10/09/24 100 kg (221 lb 5.5 oz)   09/25/24 99.8 kg (220 lb)     Currently euvolemic on exam, no acute exacerbation  Echo (8/2/23): EF 30-35%. There is mid anteroseptal, mid inferoseptal, apical septal, apical lateral, apical inferior, apical anterior and apical akinesis with wall thinning, suggestive of aneurysmal changes. Grade 1 diastolic dysfunction. TAVR bioprosthetic valve.  Continue Lasix 20 mg daily  Cardiac diet, daily weights, I&Os

## 2024-10-27 NOTE — PROGRESS NOTES
Royce Rene is a 78 y.o. male who is currently ordered Vancomycin IV with management by the Pharmacy Consult service.  Relevant clinical data and objective / subjective history reviewed.  Vancomycin Assessment:  Indication and Goal AUC/Trough: Soft tissue (goal -600, trough >10), -600, trough >10  Clinical Status: stable  Micro:     Renal Function:  SCr:  0.83 mg/dL  CrCl:  81.6 mL/min  Renal replacement: Not on dialysis  Days of Therapy:  6  Current Dose: 1000mg IV q12h  Vancomycin Plan:  New Dosing: --  Estimated AUC:  485 mcg*hr/mL  Estimated Trough:  14.4 mcg/mL  Next Level:  10/30/2024 with AM labs  Renal Function Monitoring: Daily BMP and UOP  Pharmacy will continue to follow closely for s/sx of nephrotoxicity, infusion reactions and appropriateness of therapy.  BMP and CBC will be ordered per protocol. We will continue to follow the patient’s culture results and clinical progress daily.    Lisa Lyon, Pharmacist

## 2024-10-27 NOTE — PROGRESS NOTES
Progress Note - Hospitalist   Name: Royce Rene 78 y.o. male I MRN: [id number]  Unit/Bed#: -01 I Date of Admission: 10/22/2024   Date of Service: 10/27/2024 I Hospital Day: 5    Assessment & Plan  Osteomyelitis ([facility])  Patient presented with worsening wound on right great toe since recently hitting it against a refrigerator. Hx chronic LE wounds with poor wound healing.  XR R foot: Irregularity of the soft tissues of the great toe as well as a small focus of lucency in the distal aspect of the first distal phalanx suspicious for osteomyelitis   Did not meet sepsis criteria on admission  Wound cultures initially with no growth, however now revealing 2+ growth of Pseudomonas aeruginosa  Podiatry following, maintaining on IV vancomycin & await angiogram prior to considering toe amputation  Resume IV cefepime with Pseudomonas on wound cultures & continue IV vancomycin - await possible surgical interventions post angiogram  WBAT in post-op shoe. Wound care per nursing.  PAD (peripheral artery disease) ([facility])  History of PAD s/p prior stenting  HEATHER duplex:   RLE: High-grade stenosis versus occlusion in the proximal, mid and distal superficial femoral artery with reconstitution distally.   LLE: High-grade stenosis versus occlusion in the proximal and mid superficial femoral artery with reconstitution distally.  Vascular surgery following, patient needs IR angiogram of  RLE prior to any surgical interventions   Plan for eventual transfer to [facility] over the weekend or Monday - IR unable to perform angiogram at [facility] due to complexity, tentatively plan for angiogram at [place] on 10/30 or 10/31  Continue DAPT with Plavix/aspirin, statin  Chronic combined systolic and diastolic CHF (congestive heart failure) ([facility])  Wt Readings from Last 3 Encounters:   10/27/24 98.5 kg (217 lb 3.2 oz)   10/09/24 100 kg (221 lb 5.5 oz)   09/25/24 99.8 kg (220 lb)     Currently euvolemic on exam, no acute exacerbation  Echo  (8/2/23): EF 30-35%. There is mid anteroseptal, mid inferoseptal, apical septal, apical lateral, apical inferior, apical anterior and apical akinesis with wall thinning, suggestive of aneurysmal changes. Grade 1 diastolic dysfunction. TAVR bioprosthetic valve.  Continue Lasix 20 mg daily  Cardiac diet, daily weights, I&Os  CAD (coronary artery disease)  Denies any chest pain  Continue ASA, statin, BB therapy  Moderate vascular dementia (HCC)  Patient with history of vascular dementia. Reports that he lives in an apartment complex with his son.  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 Hernandez for Oncology outpatient     VTE Pharmacologic Prophylaxis: VTE Score: 5 High Risk (Score >/= 5) - Pharmacological DVT Prophylaxis Ordered: enoxaparin (Lovenox). Sequential Compression Devices Ordered.    Mobility:   Basic Mobility Inpatient Raw Score: 18  JH-HLM Goal: 6: Walk 10 steps or more  JH-HLM Achieved: 5: Stand (1 or more minutes)  JH-HLM Goal achieved. Continue to encourage appropriate mobility.    Patient Centered Rounds: I performed bedside rounds with nursing staff today.   Discussions with Specialists or Other Care Team Provider:     Education and Discussions with Family / Patient: Attempted to update  (son) via phone. Left voicemail.     Current Length of Stay: 5 day(s)  Current Patient Status: Inpatient   Certification Statement: The patient will continue to require additional inpatient hospital stay due to procedure, angio  Discharge Plan: Anticipate discharge in >72 hrs to transfer    Code Status: Level 1 - Full Code    Subjective   Patient denies any acute complaints today stable foot pain.    Objective :  Temp:  [98.5 °F (36.9 °C)-99.2 °F (37.3 °C)] 98.5 °F (36.9 °C)  HR:  [77-88] 88  BP: (106-136)/(55-63)  115/63  Resp:  [16-20] 16  SpO2:  [94 %-96 %] 95 %  O2 Device: None (Room air)    Body mass index is 34.02 kg/m².     Input and Output Summary (last 24 hours):     Intake/Output Summary (Last 24 hours) at 10/27/2024 1156  Last data filed at 10/27/2024 0209  Gross per 24 hour   Intake 240 ml   Output 600 ml   Net -360 ml       Physical Exam  Vitals and nursing note reviewed.   Constitutional:       General: He is not in acute distress.     Appearance: He is well-developed. He is not toxic-appearing or diaphoretic.   HENT:      Head: Normocephalic and atraumatic.   Eyes:      General: No scleral icterus.     Conjunctiva/sclera: Conjunctivae normal.   Cardiovascular:      Rate and Rhythm: Normal rate and regular rhythm.      Heart sounds: No murmur heard.     No friction rub. No gallop.   Pulmonary:      Effort: Pulmonary effort is normal. No respiratory distress.      Breath sounds: Normal breath sounds. No stridor. No wheezing, rhonchi or rales.   Chest:      Chest wall: No tenderness.   Abdominal:      General: There is no distension.      Palpations: Abdomen is soft. There is no mass.      Tenderness: There is no abdominal tenderness. There is no guarding or rebound.      Hernia: No hernia is present.   Musculoskeletal:         General: No swelling or tenderness.      Cervical back: Neck supple.   Skin:     General: Skin is warm and dry.      Capillary Refill: Capillary refill takes less than 2 seconds.   Neurological:      Mental Status: He is alert and oriented to person, place, and time.   Psychiatric:         Mood and Affect: Mood normal.           Lines/Drains:      Central Line:  Goal for removal: N/A - Chronic PICC               Lab Results: I have reviewed the following results:   Results from last 7 days   Lab Units 10/27/24  0529 10/26/24  0453   WBC Thousand/uL 9.22 7.64   HEMOGLOBIN g/dL 12.1 11.3*   HEMATOCRIT % 38.1 35.2*   PLATELETS Thousands/uL 169 152   BANDS PCT %  --  1   SEGS PCT % 79*  --     LYMPHO PCT % 12* 12*   MONO PCT % 7 7   EOS PCT % 0 0     Results from last 7 days   Lab Units 10/27/24  0529 10/26/24  0453   SODIUM mmol/L 137 139   POTASSIUM mmol/L 4.6 3.9   CHLORIDE mmol/L 107 108   CO2 mmol/L 21 23   BUN mg/dL 21 21   CREATININE mg/dL 0.72 0.73   ANION GAP mmol/L 9 8   CALCIUM mg/dL 8.5 8.5   ALBUMIN g/dL  --  3.6   TOTAL BILIRUBIN mg/dL  --  0.86   ALK PHOS U/L  --  63   ALT U/L  --  24   AST U/L  --  15   GLUCOSE RANDOM mg/dL 89 86         Results from last 7 days   Lab Units 10/27/24  1126 10/27/24  0708 10/26/24  2037 10/26/24  1625 10/26/24  1126 10/26/24  0725 10/25/24  2112 10/25/24  1627 10/25/24  1117 10/25/24  0703 10/24/24  2042 10/24/24  1545   POC GLUCOSE mg/dl 108 92 122 127 107 84 131 129 143* 89 120 115     Results from last 7 days   Lab Units 10/23/24  0543   HEMOGLOBIN A1C % 5.5     Results from last 7 days   Lab Units 10/22/24  1536   PROCALCITONIN ng/ml 0.07       Recent Cultures (last 7 days):   Results from last 7 days   Lab Units 10/23/24  0931   GRAM STAIN RESULT  No Polys or Bacteria seen   WOUND CULTURE  2+ Growth of Pseudomonas aeruginosa*  1+ Growth of       Imaging Results Review: No pertinent imaging studies reviewed.  Other Study Results Review: No additional pertinent studies reviewed.    Last 24 Hours Medication List:     Current Facility-Administered Medications:     acetaminophen (TYLENOL) tablet 650 mg, Q6H PRN    aspirin chewable tablet 81 mg, Daily    atorvastatin (LIPITOR) tablet 80 mg, Daily With Dinner    bismuth subsalicylate (PEPTO BISMOL) oral suspension 524 mg, Q6H PRN    cefepime (MAXIPIME) IVPB (premix in dextrose) 2,000 mg 50 mL, Q12H, Last Rate: 2,000 mg (10/27/24 0209)    clopidogrel (PLAVIX) tablet 75 mg, Daily    enoxaparin (LOVENOX) subcutaneous injection 40 mg, Q24H DEWAYNE    ezetimibe (ZETIA) tablet 10 mg, Daily    furosemide (LASIX) tablet 20 mg, Daily    gabapentin (NEURONTIN) capsule 600 mg, BID    insulin lispro (HumALOG/ADMELOG) 100  units/mL subcutaneous injection 1-5 Units, TID AC **AND** Fingerstick Glucose (POCT), TID AC    losartan (COZAAR) tablet 12.5 mg, Daily    metoprolol succinate (TOPROL-XL) 24 hr tablet 25 mg, Daily    oxyCODONE (ROXICODONE) IR tablet 5 mg, Q4H PRN    potassium chloride (Klor-Con M20) CR tablet 20 mEq, Daily    predniSONE tablet 5 mg, BID With Meals    vancomycin (VANCOCIN) IVPB (premix in dextrose) 1,000 mg 200 mL, Q12H, Last Rate: 1,000 mg (10/27/24 0890)    Administrative Statements   Today, Patient Was Seen By: Vini Oliver DO      **Please Note: This note may have been constructed using a voice recognition system.**

## 2024-10-27 NOTE — ASSESSMENT & PLAN NOTE
History of PAD s/p prior stenting  HEATHER duplex:   RLE: High-grade stenosis versus occlusion in the proximal, mid and distal superficial femoral artery with reconstitution distally.   LLE: High-grade stenosis versus occlusion in the proximal and mid superficial femoral artery with reconstitution distally.  Vascular surgery following, patient needs IR angiogram of  RLE prior to any surgical interventions   Plan for eventual transfer to [facility] over the weekend or Monday - IR unable to perform angiogram at [facility] due to complexity, tentatively plan for angiogram at [place] on 10/30 or 10/31  Continue DAPT with Plavix/aspirin, statin

## 2024-10-27 NOTE — PLAN OF CARE
Problem: PAIN - ADULT  Goal: Verbalizes/displays adequate comfort level or baseline comfort level  Description: Interventions:  - Encourage patient to monitor pain and request assistance  - Assess pain using appropriate pain scale  - Administer analgesics based on type and severity of pain and evaluate response  - Implement non-pharmacological measures as appropriate and evaluate response  - Consider cultural and social influences on pain and pain management  - Notify physician/advanced practitioner if interventions unsuccessful or patient reports new pain  Outcome: Progressing     Problem: INFECTION - ADULT  Goal: Absence or prevention of progression during hospitalization  Description: INTERVENTIONS:  - Assess and monitor for signs and symptoms of infection  - Monitor lab/diagnostic results  - Monitor all insertion sites, i.e. indwelling lines, tubes, and drains  - Monitor endotracheal if appropriate and nasal secretions for changes in amount and color  - [place] appropriate cooling/warming therapies per order  - Administer medications as ordered  - Instruct and encourage patient and family to use good hand hygiene technique  - Identify and instruct in appropriate isolation precautions for identified infection/condition  Outcome: Progressing  Goal: Absence of fever/infection during neutropenic period  Description: INTERVENTIONS:  - Monitor WBC    Outcome: Progressing     Problem: SAFETY ADULT  Goal: Patient will remain free of falls  Description: INTERVENTIONS:  - Educate patient/family on patient safety including physical limitations  - Instruct patient to call for assistance with activity   - Consult OT/PT to assist with strengthening/mobility   - Keep Call bell within reach  - Keep bed low and locked with side rails adjusted as appropriate  - Keep care items and personal belongings within reach  - Initiate and maintain comfort rounds  - Make Fall Risk Sign visible to staff  - Offer Toileting every 2 Hours,  in advance of need  - Initiate/Maintain bed alarm  - Obtain necessary fall risk management equipment:   - Apply yellow socks and bracelet for high fall risk patients  - Consider moving patient to room near nurses station  Outcome: Progressing  Goal: Maintain or return to baseline ADL function  Description: INTERVENTIONS:  -  Assess patient's ability to carry out ADLs; assess patient's baseline for ADL function and identify physical deficits which impact ability to perform ADLs (bathing, care of mouth/teeth, toileting, grooming, dressing, etc.)  - Assess/evaluate cause of self-care deficits   - Assess range of motion  - Assess patient's mobility; develop plan if impaired  - Assess patient's need for assistive devices and provide as appropriate  - Encourage maximum independence but intervene and supervise when necessary  - Involve family in performance of ADLs  - Assess for home care needs following discharge   - Consider OT consult to assist with ADL evaluation and planning for discharge  - Provide patient education as appropriate  Outcome: Progressing  Goal: Maintains/Returns to pre admission functional level  Description: INTERVENTIONS:  - Perform AM-PAC 6 Click Basic Mobility/ Daily Activity assessment daily.  - Set and communicate daily mobility goal to care team and patient/family/caregiver.   - Collaborate with rehabilitation services on mobility goals if consulted  - Perform Range of Motion 3 times a day.  - Reposition patient every 2 hours.  - Dangle patient 3 times a day  - Stand patient 3 times a day  - Ambulate patient 3 times a day  - Out of bed to chair 3 times a day   - Out of bed for meals 3 times a day  - Out of bed for toileting  - Record patient progress and toleration of activity level   Outcome: Progressing     Problem: DISCHARGE PLANNING  Goal: Discharge to home or other facility with appropriate resources  Description: INTERVENTIONS:  - Identify barriers to discharge w/patient and caregiver  -  Arrange for needed discharge resources and transportation as appropriate  - Identify discharge learning needs (meds, wound care, etc.)  - Arrange for interpretive services to assist at discharge as needed  - Refer to Case Management Department for coordinating discharge planning if the patient needs post-hospital services based on physician/advanced practitioner order or complex needs related to functional status, cognitive ability, or social support system  Outcome: Progressing     Problem: Knowledge Deficit  Goal: Patient/family/caregiver demonstrates understanding of disease process, treatment plan, medications, and discharge instructions  Description: Complete learning assessment and assess knowledge base.  Interventions:  - Provide teaching at level of understanding  - Provide teaching via preferred learning methods  Outcome: Progressing

## 2024-10-28 ENCOUNTER — HOSPITAL ENCOUNTER (INPATIENT)
Facility: HOSPITAL | Age: 79
LOS: 6 days | Discharge: HOME HEALTH | DRG: 271 | End: 2024-11-03
Attending: INTERNAL MEDICINE | Admitting: INTERNAL MEDICINE
Payer: COMMERCIAL

## 2024-10-28 VITALS
HEART RATE: 78 BPM | SYSTOLIC BLOOD PRESSURE: 167 MMHG | DIASTOLIC BLOOD PRESSURE: 79 MMHG | RESPIRATION RATE: 18 BRPM | BODY MASS INDEX: 34.22 KG/M2 | TEMPERATURE: 97.8 F | HEIGHT: 67 IN | OXYGEN SATURATION: 96 % | WEIGHT: 218.04 LBS

## 2024-10-28 DIAGNOSIS — L97.521 ULCER OF LEFT FOOT, LIMITED TO BREAKDOWN OF SKIN (HCC): ICD-10-CM

## 2024-10-28 DIAGNOSIS — M86.9 OSTEOMYELITIS OF RIGHT FOOT, UNSPECIFIED TYPE (HCC): Primary | ICD-10-CM

## 2024-10-28 DIAGNOSIS — L97.512 ULCER OF RIGHT FOOT WITH FAT LAYER EXPOSED (HCC): ICD-10-CM

## 2024-10-28 DIAGNOSIS — I73.9 PAD (PERIPHERAL ARTERY DISEASE) (HCC): ICD-10-CM

## 2024-10-28 PROBLEM — I50.33 ACUTE ON CHRONIC DIASTOLIC CHF (CONGESTIVE HEART FAILURE) (HCC): Status: RESOLVED | Noted: 2024-03-27 | Resolved: 2024-10-28

## 2024-10-28 LAB
GLUCOSE SERPL-MCNC: 102 MG/DL (ref 65–140)
GLUCOSE SERPL-MCNC: 165 MG/DL (ref 65–140)
GLUCOSE SERPL-MCNC: 190 MG/DL (ref 65–140)
GLUCOSE SERPL-MCNC: 86 MG/DL (ref 65–140)

## 2024-10-28 PROCEDURE — 99239 HOSP IP/OBS DSCHRG MGMT >30: CPT | Performed by: NURSE PRACTITIONER

## 2024-10-28 PROCEDURE — 99223 1ST HOSP IP/OBS HIGH 75: CPT | Performed by: INTERNAL MEDICINE

## 2024-10-28 PROCEDURE — NC001 PR NO CHARGE: Performed by: STUDENT IN AN ORGANIZED HEALTH CARE EDUCATION/TRAINING PROGRAM

## 2024-10-28 PROCEDURE — 82948 REAGENT STRIP/BLOOD GLUCOSE: CPT

## 2024-10-28 RX ORDER — LOSARTAN POTASSIUM 25 MG/1
12.5 TABLET ORAL DAILY
Status: DISCONTINUED | OUTPATIENT
Start: 2024-10-29 | End: 2024-11-03 | Stop reason: HOSPADM

## 2024-10-28 RX ORDER — OXYCODONE HYDROCHLORIDE 5 MG/1
5 TABLET ORAL EVERY 4 HOURS PRN
Status: DISCONTINUED | OUTPATIENT
Start: 2024-10-28 | End: 2024-11-03 | Stop reason: HOSPADM

## 2024-10-28 RX ORDER — CEFEPIME HYDROCHLORIDE 2 G/50ML
2000 INJECTION, SOLUTION INTRAVENOUS EVERY 12 HOURS
Status: DISCONTINUED | OUTPATIENT
Start: 2024-10-28 | End: 2024-11-03 | Stop reason: HOSPADM

## 2024-10-28 RX ORDER — FUROSEMIDE 20 MG/1
20 TABLET ORAL DAILY
Status: DISCONTINUED | OUTPATIENT
Start: 2024-10-29 | End: 2024-11-03 | Stop reason: HOSPADM

## 2024-10-28 RX ORDER — VANCOMYCIN HYDROCHLORIDE 1 G/200ML
10 INJECTION, SOLUTION INTRAVENOUS EVERY 12 HOURS
Status: CANCELLED | OUTPATIENT
Start: 2024-10-28

## 2024-10-28 RX ORDER — ENOXAPARIN SODIUM 100 MG/ML
40 INJECTION SUBCUTANEOUS
Status: DISCONTINUED | OUTPATIENT
Start: 2024-10-29 | End: 2024-11-03 | Stop reason: HOSPADM

## 2024-10-28 RX ORDER — ATORVASTATIN CALCIUM 40 MG/1
80 TABLET, FILM COATED ORAL
Status: CANCELLED | OUTPATIENT
Start: 2024-10-28

## 2024-10-28 RX ORDER — GABAPENTIN 300 MG/1
600 CAPSULE ORAL 2 TIMES DAILY
Status: DISCONTINUED | OUTPATIENT
Start: 2024-10-28 | End: 2024-11-03 | Stop reason: HOSPADM

## 2024-10-28 RX ORDER — OXYCODONE HYDROCHLORIDE 5 MG/1
5 TABLET ORAL EVERY 4 HOURS PRN
Status: CANCELLED | OUTPATIENT
Start: 2024-10-28

## 2024-10-28 RX ORDER — POTASSIUM CHLORIDE 1500 MG/1
20 TABLET, EXTENDED RELEASE ORAL DAILY
Status: CANCELLED | OUTPATIENT
Start: 2024-10-29

## 2024-10-28 RX ORDER — METOPROLOL SUCCINATE 25 MG/1
25 TABLET, EXTENDED RELEASE ORAL DAILY
Status: DISCONTINUED | OUTPATIENT
Start: 2024-10-29 | End: 2024-11-03 | Stop reason: HOSPADM

## 2024-10-28 RX ORDER — ACETAMINOPHEN 325 MG/1
650 TABLET ORAL EVERY 6 HOURS PRN
Status: CANCELLED | OUTPATIENT
Start: 2024-10-28

## 2024-10-28 RX ORDER — ATORVASTATIN CALCIUM 80 MG/1
80 TABLET, FILM COATED ORAL
Status: DISCONTINUED | OUTPATIENT
Start: 2024-10-28 | End: 2024-11-03 | Stop reason: HOSPADM

## 2024-10-28 RX ORDER — ENOXAPARIN SODIUM 100 MG/ML
40 INJECTION SUBCUTANEOUS
Status: CANCELLED | OUTPATIENT
Start: 2024-10-29

## 2024-10-28 RX ORDER — POTASSIUM CHLORIDE 1500 MG/1
20 TABLET, EXTENDED RELEASE ORAL DAILY
Status: DISCONTINUED | OUTPATIENT
Start: 2024-10-29 | End: 2024-11-03 | Stop reason: HOSPADM

## 2024-10-28 RX ORDER — METHOCARBAMOL 500 MG/1
500 TABLET, FILM COATED ORAL EVERY 8 HOURS PRN
Status: DISCONTINUED | OUTPATIENT
Start: 2024-10-28 | End: 2024-11-03 | Stop reason: HOSPADM

## 2024-10-28 RX ORDER — CLOPIDOGREL BISULFATE 75 MG/1
75 TABLET ORAL DAILY
Status: DISCONTINUED | OUTPATIENT
Start: 2024-10-29 | End: 2024-11-03 | Stop reason: HOSPADM

## 2024-10-28 RX ORDER — EZETIMIBE 10 MG/1
10 TABLET ORAL DAILY
Status: CANCELLED | OUTPATIENT
Start: 2024-10-29

## 2024-10-28 RX ORDER — PREDNISONE 5 MG/1
5 TABLET ORAL 2 TIMES DAILY WITH MEALS
Status: CANCELLED | OUTPATIENT
Start: 2024-10-28

## 2024-10-28 RX ORDER — FUROSEMIDE 20 MG/1
20 TABLET ORAL DAILY
Status: CANCELLED | OUTPATIENT
Start: 2024-10-29

## 2024-10-28 RX ORDER — ASPIRIN 81 MG/1
81 TABLET, CHEWABLE ORAL DAILY
Status: DISCONTINUED | OUTPATIENT
Start: 2024-10-29 | End: 2024-11-03 | Stop reason: HOSPADM

## 2024-10-28 RX ORDER — PREDNISONE 5 MG/1
5 TABLET ORAL 2 TIMES DAILY WITH MEALS
Status: DISCONTINUED | OUTPATIENT
Start: 2024-10-28 | End: 2024-11-03 | Stop reason: HOSPADM

## 2024-10-28 RX ORDER — INSULIN LISPRO 100 [IU]/ML
1-5 INJECTION, SOLUTION INTRAVENOUS; SUBCUTANEOUS
Status: CANCELLED | OUTPATIENT
Start: 2024-10-28

## 2024-10-28 RX ORDER — CEFEPIME HYDROCHLORIDE 2 G/50ML
2000 INJECTION, SOLUTION INTRAVENOUS EVERY 12 HOURS
Status: CANCELLED | OUTPATIENT
Start: 2024-10-28

## 2024-10-28 RX ORDER — ASPIRIN 81 MG/1
81 TABLET, CHEWABLE ORAL DAILY
Status: CANCELLED | OUTPATIENT
Start: 2024-10-29

## 2024-10-28 RX ORDER — EZETIMIBE 10 MG/1
10 TABLET ORAL DAILY
Status: DISCONTINUED | OUTPATIENT
Start: 2024-10-29 | End: 2024-11-03 | Stop reason: HOSPADM

## 2024-10-28 RX ORDER — INSULIN LISPRO 100 [IU]/ML
1-5 INJECTION, SOLUTION INTRAVENOUS; SUBCUTANEOUS
Status: DISCONTINUED | OUTPATIENT
Start: 2024-10-28 | End: 2024-11-03 | Stop reason: HOSPADM

## 2024-10-28 RX ORDER — VANCOMYCIN HYDROCHLORIDE 1 G/200ML
10 INJECTION, SOLUTION INTRAVENOUS EVERY 12 HOURS
Status: DISCONTINUED | OUTPATIENT
Start: 2024-10-28 | End: 2024-11-01

## 2024-10-28 RX ORDER — GABAPENTIN 300 MG/1
600 CAPSULE ORAL 2 TIMES DAILY
Status: CANCELLED | OUTPATIENT
Start: 2024-10-28

## 2024-10-28 RX ORDER — CLOPIDOGREL BISULFATE 75 MG/1
75 TABLET ORAL DAILY
Status: CANCELLED | OUTPATIENT
Start: 2024-10-29

## 2024-10-28 RX ORDER — ACETAMINOPHEN 325 MG/1
650 TABLET ORAL EVERY 6 HOURS PRN
Status: DISCONTINUED | OUTPATIENT
Start: 2024-10-28 | End: 2024-11-01

## 2024-10-28 RX ORDER — METOPROLOL SUCCINATE 25 MG/1
25 TABLET, EXTENDED RELEASE ORAL DAILY
Status: CANCELLED | OUTPATIENT
Start: 2024-10-29

## 2024-10-28 RX ORDER — LOSARTAN POTASSIUM 25 MG/1
12.5 TABLET ORAL DAILY
Status: CANCELLED | OUTPATIENT
Start: 2024-10-29

## 2024-10-28 RX ADMIN — METHOCARBAMOL TABLETS 500 MG: 500 TABLET, COATED ORAL at 18:38

## 2024-10-28 RX ADMIN — ATORVASTATIN CALCIUM 80 MG: 80 TABLET, FILM COATED ORAL at 16:35

## 2024-10-28 RX ADMIN — CLOPIDOGREL 75 MG: 75 TABLET ORAL at 08:18

## 2024-10-28 RX ADMIN — ASPIRIN 81 MG 81 MG: 81 TABLET ORAL at 08:18

## 2024-10-28 RX ADMIN — OXYCODONE HYDROCHLORIDE 5 MG: 5 TABLET ORAL at 14:40

## 2024-10-28 RX ADMIN — LOSARTAN POTASSIUM 12.5 MG: 25 TABLET, FILM COATED ORAL at 08:18

## 2024-10-28 RX ADMIN — FUROSEMIDE 20 MG: 20 TABLET ORAL at 08:18

## 2024-10-28 RX ADMIN — CEFEPIME HYDROCHLORIDE 2000 MG: 2 INJECTION, SOLUTION INTRAVENOUS at 13:23

## 2024-10-28 RX ADMIN — GABAPENTIN 600 MG: 300 CAPSULE ORAL at 18:38

## 2024-10-28 RX ADMIN — PREDNISONE 5 MG: 10 TABLET ORAL at 08:18

## 2024-10-28 RX ADMIN — OXYCODONE HYDROCHLORIDE 5 MG: 5 TABLET ORAL at 01:16

## 2024-10-28 RX ADMIN — VANCOMYCIN HYDROCHLORIDE 1000 MG: 1 INJECTION, SOLUTION INTRAVENOUS at 08:18

## 2024-10-28 RX ADMIN — METOPROLOL SUCCINATE 25 MG: 25 TABLET, EXTENDED RELEASE ORAL at 08:18

## 2024-10-28 RX ADMIN — OXYCODONE HYDROCHLORIDE 5 MG: 5 TABLET ORAL at 10:07

## 2024-10-28 RX ADMIN — INSULIN LISPRO 1 UNITS: 100 INJECTION, SOLUTION INTRAVENOUS; SUBCUTANEOUS at 19:06

## 2024-10-28 RX ADMIN — GABAPENTIN 600 MG: 300 CAPSULE ORAL at 08:18

## 2024-10-28 RX ADMIN — PREDNISONE 5 MG: 5 TABLET ORAL at 16:35

## 2024-10-28 RX ADMIN — EZETIMIBE 10 MG: 10 TABLET ORAL at 08:18

## 2024-10-28 RX ADMIN — VANCOMYCIN HYDROCHLORIDE 1000 MG: 1 INJECTION, SOLUTION INTRAVENOUS at 20:19

## 2024-10-28 RX ADMIN — CEFEPIME HYDROCHLORIDE 2000 MG: 2 INJECTION, SOLUTION INTRAVENOUS at 01:23

## 2024-10-28 RX ADMIN — POTASSIUM CHLORIDE 20 MEQ: 1500 TABLET, EXTENDED RELEASE ORAL at 08:18

## 2024-10-28 RX ADMIN — ENOXAPARIN SODIUM 40 MG: 40 INJECTION SUBCUTANEOUS at 08:18

## 2024-10-28 NOTE — ASSESSMENT & PLAN NOTE
Wt Readings from Last 3 Encounters:   10/28/24 98.9 kg (218 lb 0.6 oz)   10/09/24 100 kg (221 lb 5.5 oz)   09/25/24 99.8 kg (220 lb)     Currently euvolemic on exam, no acute exacerbation  Echo (8/2/23): EF 30-35%. There is mid anteroseptal, mid inferoseptal, apical septal, apical lateral, apical inferior, apical anterior and apical akinesis with wall thinning, suggestive of aneurysmal changes. Grade 1 diastolic dysfunction. TAVR bioprosthetic valve.  Continue Lasix 20 mg daily  Cardiac diet, daily weights, I&Os

## 2024-10-28 NOTE — ASSESSMENT & PLAN NOTE
Patient originally presented to [facility] with worsening wound on right great toe since hitting it recently.  XR R foot suspicious for osteomyelitis of great toe  Wound culture grew pseudomonas  Currently on cefepime and vancomycin  Podiatry and vascular surgery consulted  Plan for angiogram tomorrow  Possible surgical intervention per podiatry

## 2024-10-28 NOTE — ASSESSMENT & PLAN NOTE
Patient with metastatic prostate cancer  Currently on chemotherapy with Jevtana every 3 weeks  Continue prednisone  Follows with Dr. Hernandez, [facility]

## 2024-10-28 NOTE — PLAN OF CARE
Problem: PAIN - ADULT  Goal: Verbalizes/displays adequate comfort level or baseline comfort level  Description: Interventions:  - Encourage patient to monitor pain and request assistance  - Assess pain using appropriate pain scale  - Administer analgesics based on type and severity of pain and evaluate response  - Implement non-pharmacological measures as appropriate and evaluate response  - Consider cultural and social influences on pain and pain management  - Notify physician/advanced practitioner if interventions unsuccessful or patient reports new pain  Outcome: Progressing     Problem: INFECTION - ADULT  Goal: Absence or prevention of progression during hospitalization  Description: INTERVENTIONS:  - Assess and monitor for signs and symptoms of infection  - Monitor lab/diagnostic results  - Monitor all insertion sites, i.e. indwelling lines, tubes, and drains  - Monitor endotracheal if appropriate and nasal secretions for changes in amount and color  - [place] appropriate cooling/warming therapies per order  - Administer medications as ordered  - Instruct and encourage patient and family to use good hand hygiene technique  - Identify and instruct in appropriate isolation precautions for identified infection/condition  Outcome: Progressing  Goal: Absence of fever/infection during neutropenic period  Description: INTERVENTIONS:  - Monitor WBC    Outcome: Progressing     Problem: SAFETY ADULT  Goal: Patient will remain free of falls  Description: INTERVENTIONS:  - Educate patient/family on patient safety including physical limitations  - Instruct patient to call for assistance with activity   - Consult OT/PT to assist with strengthening/mobility   - Keep Call bell within reach  - Keep bed low and locked with side rails adjusted as appropriate  - Keep care items and personal belongings within reach  - Initiate and maintain comfort rounds  - Make Fall Risk Sign visible to staff  - Offer Toileting every 2 Hours,  in advance of need  - Initiate/Maintain bed alarm  - Apply yellow socks and bracelet for high fall risk patients  - Consider moving patient to room near nurses station  Outcome: Progressing  Goal: Maintain or return to baseline ADL function  Description: INTERVENTIONS:  -  Assess patient's ability to carry out ADLs; assess patient's baseline for ADL function and identify physical deficits which impact ability to perform ADLs (bathing, care of mouth/teeth, toileting, grooming, dressing, etc.)  - Assess/evaluate cause of self-care deficits   - Assess range of motion  - Assess patient's mobility; develop plan if impaired  - Assess patient's need for assistive devices and provide as appropriate  - Encourage maximum independence but intervene and supervise when necessary  - Involve family in performance of ADLs  - Assess for home care needs following discharge   - Consider OT consult to assist with ADL evaluation and planning for discharge  - Provide patient education as appropriate  Outcome: Progressing  Goal: Maintains/Returns to pre admission functional level  Description: INTERVENTIONS:  - Perform AM-PAC 6 Click Basic Mobility/ Daily Activity assessment daily.  - Set and communicate daily mobility goal to care team and patient/family/caregiver.   - Collaborate with rehabilitation services on mobility goals if consulted  - Perform Range of Motion 2 times a day.  - Reposition patient every 2 hours.  - Dangle patient 2 times a day  - Stand patient 2 times a day  - Ambulate patient 2 times a day  - Out of bed to chair 2 times a day   - Out of bed for meals 2 times a day  - Out of bed for toileting  - Record patient progress and toleration of activity level   Outcome: Progressing     Problem: DISCHARGE PLANNING  Goal: Discharge to home or other facility with appropriate resources  Description: INTERVENTIONS:  - Identify barriers to discharge w/patient and caregiver  - Arrange for needed discharge resources and  transportation as appropriate  - Identify discharge learning needs (meds, wound care, etc.)  - Arrange for interpretive services to assist at discharge as needed  - Refer to Case Management Department for coordinating discharge planning if the patient needs post-hospital services based on physician/advanced practitioner order or complex needs related to functional status, cognitive ability, or social support system  Outcome: Progressing     Problem: Knowledge Deficit  Goal: Patient/family/caregiver demonstrates understanding of disease process, treatment plan, medications, and discharge instructions  Description: Complete learning assessment and assess knowledge base.  Interventions:  - Provide teaching at level of understanding  - Provide teaching via preferred learning methods  Outcome: Progressing     Problem: NEUROSENSORY - ADULT  Goal: Achieves stable or improved neurological status  Description: INTERVENTIONS  - Monitor and report changes in neurological status  - Monitor vital signs such as temperature, blood pressure, glucose, and any other labs ordered   - Initiate measures to prevent increased intracranial pressure  - Monitor for seizure activity and implement precautions if appropriate      Outcome: Progressing  Goal: Remains free of injury related to seizures activity  Description: INTERVENTIONS  - Maintain airway, patient safety  and administer oxygen as ordered  - Monitor patient for seizure activity, document and report duration and description of seizure to physician/advanced practitioner  - If seizure occurs,  ensure patient safety during seizure  - Reorient patient post seizure  - Seizure pads on all 4 side rails  - Instruct patient/family to notify RN of any seizure activity including if an aura is experienced  - Instruct patient/family to call for assistance with activity based on nursing assessment  - Administer anti-seizure medications if ordered    Outcome: Progressing  Goal: Achieves maximal  functionality and self care  Description: INTERVENTIONS  - Monitor swallowing and airway patency with patient fatigue and changes in neurological status  - Encourage and assist patient to increase activity and self care.   - Encourage visually impaired, hearing impaired and aphasic patients to use assistive/communication devices  Outcome: Progressing     Problem: METABOLIC, FLUID AND ELECTROLYTES - ADULT  Goal: Electrolytes maintained within normal limits  Description: INTERVENTIONS:  - Monitor labs and assess patient for signs and symptoms of electrolyte imbalances  - Administer electrolyte replacement as ordered  - Monitor response to electrolyte replacements, including repeat lab results as appropriate  - Instruct patient on fluid and nutrition as appropriate  Outcome: Progressing  Goal: Fluid balance maintained  Description: INTERVENTIONS:  - Monitor labs   - Monitor I/O and WT  - Instruct patient on fluid and nutrition as appropriate  - Assess for signs & symptoms of volume excess or deficit  Outcome: Progressing  Goal: Glucose maintained within target range  Description: INTERVENTIONS:  - Monitor Blood Glucose as ordered  - Assess for signs and symptoms of hyperglycemia and hypoglycemia  - Administer ordered medications to maintain glucose within target range  - Assess nutritional intake and initiate nutrition service referral as needed  Outcome: Progressing     Problem: SKIN/TISSUE INTEGRITY - ADULT  Goal: Skin Integrity remains intact(Skin Breakdown Prevention)  Description: Assess:  -Perform [place] assessment every 4 hours  -Clean and moisturize skin every shift  -Inspect skin when repositioning, toileting, and assisting with ADLS  -Assess extremities for adequate circulation and sensation     Bed Management:  -Have minimal linens on bed & keep smooth, unwrinkled  -Change linens as needed when moist or perspiring  -Avoid sitting or lying in one position for more than 2 hours while in bed  -Keep HOB at 45  degrees     Toileting:  -Offer bedside commode  -Assess for incontinence every 2 hours    Activity:  -Mobilize patient 3 times a day  -Encourage activity and walks on unit  -Encourage or provide ROM exercises   -Turn and reposition patient every 2 Hours  -Use appropriate equipment to lift or move patient in bed  -Instruct/ Assist with weight shifting every 2 hours when out of bed in chair  -Consider limitation of chair time 2 hour intervals    Skin Care:  -Avoid use of baby powder, tape, friction and shearing, hot water or constrictive clothing    -Do not massage red bony areas    Next Steps:  Outcome: Progressing  Goal: Incision(s), wounds(s) or drain site(s) healing without S/S of infection  Description: INTERVENTIONS  - Assess and document dressing, incision, wound bed, drain sites and surrounding tissue  - Provide patient and family education  - Perform skin care/dressing changes every 2 hours  Outcome: Progressing  Goal: Pressure injury heals and does not worsen  Description: Interventions:  - Implement low air loss mattress or specialty surface (Criteria met)  - Apply silicone foam dressing  - Instruct/assist with weight shifting every 120 minutes when in chair   - Limit chair time to 2 hour intervals  - Use special pressure reducing interventions such as waffle cushions when in chair   - Apply fecal or urinary incontinence containment device   - Perform passive or active ROM every 2 hours  - Turn and reposition patient & offload bony prominences every 2 hours   - Utilize friction reducing device or surface for transfers   - Consider nutrition services referral as needed  Outcome: Progressing     Problem: MUSCULOSKELETAL - ADULT  Goal: Maintain or return mobility to safest level of function  Description: INTERVENTIONS:  - Assess patient's ability to carry out ADLs; assess patient's baseline for ADL function and identify physical deficits which impact ability to perform ADLs (bathing, care of mouth/teeth,  toileting, grooming, dressing, etc.)  - Assess/evaluate cause of self-care deficits   - Assess range of motion  - Assess patient's mobility  - Assess patient's need for assistive devices and provide as appropriate  - Encourage maximum independence but intervene and supervise when necessary  - Involve family in performance of ADLs  - Assess for home care needs following discharge   - Consider OT consult to assist with ADL evaluation and planning for discharge  - Provide patient education as appropriate  Outcome: Progressing  Goal: Maintain proper alignment of affected body part  Description: INTERVENTIONS:  - Support, maintain and protect limb and body alignment  - Provide patient/ family with appropriate education  Outcome: Progressing

## 2024-10-28 NOTE — DISCHARGE SUMMARY
Discharge Summary - Hospitalist   Name: Royce Rene 78 y.o. male I MRN: [id number]  Unit/Bed#: -01 I Date of Admission: 10/22/2024   Date of Service: 10/28/2024 I Hospital Day: 6     Assessment & Plan  Osteomyelitis (HCC)  Patient presented with worsening wound on right great toe since recently hitting it against a refrigerator. Hx chronic LE wounds with poor wound healing.  XR R foot: Irregularity of the soft tissues of the great toe as well as a small focus of lucency in the distal aspect of the first distal phalanx suspicious for osteomyelitis   Did not meet sepsis criteria on admission  Wound cultures with growth of Pseudomonas aeruginosa  Podiatry and vascular surgery following  Maintained on IV Cefepime and Vancomycin   Await possible surgical interventions post angiogram  WBAT in post-op shoe. Wound care per nursing.  PAD (peripheral artery disease) ([facility])  History of PAD s/p prior stenting  HEATHER duplex:   RLE: High-grade stenosis versus occlusion in the proximal, mid and distal superficial femoral artery with reconstitution distally.   LLE: High-grade stenosis versus occlusion in the proximal and mid superficial femoral artery with reconstitution distally.  Vascular surgery following, patient needs IR angiogram of RLE prior to any surgical interventions   Plan for transfer to [facility] today - IR unable to perform angiogram at [facility] due to complexity, tentatively plan for angiogram at [place] on 10/30   Continue DAPT with Plavix/aspirin, statin  Chronic combined systolic and diastolic CHF (congestive heart failure) ([facility])  Wt Readings from Last 3 Encounters:   10/28/24 98.9 kg (218 lb 0.6 oz)   10/09/24 100 kg (221 lb 5.5 oz)   09/25/24 99.8 kg (220 lb)     Currently euvolemic on exam, no acute exacerbation  Echo (8/2/23): EF 30-35%. There is mid anteroseptal, mid inferoseptal, apical septal, apical lateral, apical inferior, apical anterior and apical akinesis with wall thinning, suggestive of  aneurysmal changes. Grade 1 diastolic dysfunction. TAVR bioprosthetic valve.  Continue Lasix 20 mg daily  Cardiac diet, daily weights, I&Os  CAD (coronary artery disease)  Denies any chest pain  Continue ASA, statin, BB therapy  Moderate vascular dementia (HCC)  Patient with history of vascular dementia. Reports that he lives in an apartment complex with his son.  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 Hernandez for Oncology outpatient      Medical Problems       Resolved Problems  Date Reviewed: 10/26/2024   None       Discharging Physician / Practitioner: OMAIRA Prather  PCP: Anne Chandra MD  Admission Date:   Admission Orders (From admission, onward)       Ordered        10/22/24 1640  INPATIENT ADMISSION  Once                          Discharge Date: 10/28/24    Consultations During Hospital Stay:  Podiatry  Vascular Surgery  Interventional Radiology     Procedures Performed:   Right foot xray: Irregularity of the soft tissues of the great toe as well as a small focus of lucency in the distal aspect of the first distal phalanx suspicious for osteomyelitis. MRI may be performed for further evaluation as clinically warranted.   LEADS: Lower limb: High-grade stenosis versus occlusion in the proximal, mid and distal superficial femoral artery with reconstitution distally.  Left lower limb: High-grade stenosis versus occlusion in the proximal and mid superficial femoral artery with reconstitution distally.    Significant Findings / Test Results:   Right great toe osteomyelitis secondary to severe PAD    Incidental Findings:   None    Test Results Pending at Discharge (will require follow up):   None     Outpatient Tests Requested:  Transferred to [facility] for IR  "angiogram    Complications: None    Reason for Admission: Right great toe ulceration with wound, found to have osteomyelitis secondary to severe PAD    Hospital Course:   Royce Rene is a 78 y.o. male patient who originally presented to the hospital on 10/22/2024 due to concern for worsening infection of the right great toe.  X-ray revealed concern for osteomyelitis.  Podiatry following.  Will likely need amputation of the right great toe once cleared by vascular surgery given significant peripheral arterial disease.  Lower extremity arterial duplex reveals high-grade stenosis versus occlusion.  Patient will need angiogram which IR cannot accommodate at [facility] due to complexity.  Patient will be transferred to [facility] for a tentative angiogram on Wednesday.  This case was reviewed by IR, podiatry, and vascular surgery.  Patient is currently maintained on IV vancomycin and cefepime.  Patient is amenable to transfer and will be transferring later today.    Please see above list of diagnoses and related plan for additional information.     Condition at Discharge: stable    Discharge Day Visit / Exam:   Subjective: Patient seen and examined at bedside.  He is resting comfortably.  He offers no complaints.  He is aware that he will be transferring to [facility] today for angiogram this week for which she is amenable.  He denies headache, dizziness, chest pain, shortness of breath, nausea, vomiting, or diarrhea.  Vitals: Blood Pressure: 167/79 (10/28/24 0707)  Pulse: 78 (10/28/24 0707)  Temperature: 97.8 °F (36.6 °C) (10/28/24 0707)  Temp Source: Oral (10/28/24 0707)  Respirations: 18 (10/28/24 0707)  Height: 5' 7\" (170.2 cm) (10/22/24 1713)  Weight - Scale: 98.9 kg (218 lb 0.6 oz) (10/28/24 0542)  SpO2: 96 % (10/28/24 0707)  Physical Exam  Vitals and nursing note reviewed.   Constitutional:       General: He is not in acute distress.     Appearance: He is not " toxic-appearing or diaphoretic.   HENT:      Head: Normocephalic.      Mouth/Throat:      Mouth: Mucous membranes are moist.   Eyes:      Conjunctiva/sclera: Conjunctivae normal.   Cardiovascular:      Rate and Rhythm: Normal rate.   Pulmonary:      Effort: Pulmonary effort is normal.      Breath sounds: Normal breath sounds.   Abdominal:      General: Bowel sounds are normal. There is no distension.      Palpations: Abdomen is soft.      Tenderness: There is no abdominal tenderness.   Musculoskeletal:         General: Normal range of motion.      Cervical back: Normal range of motion.      Right lower leg: No edema.      Left lower leg: No edema.   Skin:     General: Skin is dry.      Comments: Right great toe necrotic ulceration    Neurological:      Mental Status: He is alert and oriented to person, place, and time. Mental status is at baseline.   Psychiatric:         Mood and Affect: Mood normal.         Speech: Speech normal.         Behavior: Behavior is cooperative.          Discussion with Family: Attempted to update  (son) via phone. Left voicemail.     Discharge instructions/Information to patient and family:   See after visit summary for information provided to patient and family.      Provisions for Follow-Up Care:  See after visit summary for information related to follow-up care and any pertinent home health orders.      Mobility at time of Discharge:   Basic Mobility Inpatient Raw Score: 18  JH-HLM Goal: 6: Walk 10 steps or more  JH-HLM Achieved: 5: Stand (1 or more minutes)  HLM Goal NOT achieved. Continue to encourage mobility in post discharge setting.     Disposition:   Acute Care Hospital Transfer to [facility]     Planned Readmission: Yes to [facility]     Discharge Medications:  See after visit summary for reconciled discharge medications provided to patient and/or family.      Administrative Statements   Discharge Statement:  I have spent a total time of > 30 minutes in  caring for this patient on the day of the visit/encounter. >30 minutes of time was spent on: Diagnostic results, Instructions for management, Patient and family education, Importance of tx compliance, Impressions, Counseling / Coordination of care, Documenting in the medical record, Reviewing / ordering tests, medicine, procedures  , and Communicating with other healthcare professionals .    **Please Note: This note may have been constructed using a voice recognition system**

## 2024-10-28 NOTE — ASSESSMENT & PLAN NOTE
History of peripheral artery disease s/p prior stenting and angioplasty.  He has had endarterectomy and profundoplasty by Dr. Cook in July 2021  Lower extremity arterial duplex demonstrating high-grade stenosis versus occlusion in the proximal mid and distal superficial femoral artery  Vascular surgery and IR consulted  Plan for anigiogram tomorrow  Continue aspirin, plavix, statin   Normal

## 2024-10-28 NOTE — ASSESSMENT & PLAN NOTE
History of PAD s/p prior stenting  HEATHER duplex:   RLE: High-grade stenosis versus occlusion in the proximal, mid and distal superficial femoral artery with reconstitution distally.   LLE: High-grade stenosis versus occlusion in the proximal and mid superficial femoral artery with reconstitution distally.  Vascular surgery following, patient needs IR angiogram of RLE prior to any surgical interventions   Plan for transfer to [facility] today - IR unable to perform angiogram at [facility] due to complexity, tentatively plan for angiogram at [place] on 10/30   Continue DAPT with Plavix/aspirin, statin

## 2024-10-28 NOTE — PROGRESS NOTES
Royce Rene is a 78 y.o. male who is currently ordered Vancomycin IV with management by the Pharmacy Consult service.  Relevant clinical data and objective / subjective history reviewed.  Vancomycin Assessment:  Indication and Goal AUC/Trough: Soft tissue (goal -600, trough >10), -600, trough >10  Clinical Status: stable  Micro:     Renal Function:  SCr: 0.72 mg/dL  CrCl: 95.8 mL/min  Renal replacement: Not on dialysis  Days of Therapy: 7 (transfer from [facility])  Current Dose: 1000mg IV q12h  Vancomycin Plan:  New Dosinmg IV q12h  Estimated AUC: 481 mcg*hr/mL  Estimated Trough: 14.4 mcg/mL  Next Level: 10/30/24 @ 0600  Renal Function Monitoring: Daily BMP and UOP  Pharmacy will continue to follow closely for s/sx of nephrotoxicity, infusion reactions and appropriateness of therapy.  BMP and CBC will be ordered per protocol. We will continue to follow the patient’s culture results and clinical progress daily.    [place] Garrett, Pharmacist

## 2024-10-28 NOTE — CONSULTS
e-Consult (IPC)  - Interventional Radiology  Royce Rene 78 y.o. male MRN: [id number]  Unit/Bed#: -01 Encounter: [id number]          Interventional Radiology has been consulted to evaluate Royce Rene    We were consulted by Vascular Surgery concerning this patient with history of peripheral arterial disease (prior right femoral endarterectomy 2021 and R fem/pop Shockwave lithoplasty with SFA PORTIA as well as tibial PTA) who presented with acute on chronic right hallux ulceration with underlying osteomyelitis.    Inpatient Consult to IR  Consult performed by: Kelvin Cardenas MD  Consult ordered by: Ginna Gonzales PA-C        10/28/24    Assessment/Recommendation:   Previously discussed with my partner Dr. Raymond Alcala - given anticipated complexity of the intervention as well as schedule availability, transfer to another [place] was recommended.    A schedule block has been held at [facility] for this procedure on the afternoon of Wednesday 10/30.    Please place a repeat consultation to IR and Vascular Surgery after transfer; he will be re-evaluated and a procedure plan will be determined.    11-20 minutes, >50% of the total time devoted to medical consultative verbal/EMR discussion between providers. Written report will be generated in the EMR.     Thank you for allowing Interventional Radiology to participate in the care of Royce Rene. Please don't hesitate to call or TigerText us with any questions.     Kelvin Cardenas MD

## 2024-10-28 NOTE — ASSESSMENT & PLAN NOTE
Wt Readings from Last 3 Encounters:   10/29/24 99.8 kg (220 lb)   10/28/24 98.9 kg (218 lb 0.6 oz)   10/09/24 100 kg (221 lb 5.5 oz)     ECHO 8/2023: EF 30-35%, systolic function severely reduced, diastolic function abnormal consistent with grade I relaxation  Continue Lasix 20 mg daily, Toprol XL 25 mg daily  Appears euvolemic  Monitor Is/Os and daily weights

## 2024-10-28 NOTE — PLAN OF CARE
Problem: PAIN - ADULT  Goal: Verbalizes/displays adequate comfort level or baseline comfort level  Description: Interventions:  - Encourage patient to monitor pain and request assistance  - Assess pain using appropriate pain scale  - Administer analgesics based on type and severity of pain and evaluate response  - Implement non-pharmacological measures as appropriate and evaluate response  - Consider cultural and social influences on pain and pain management  - Notify physician/advanced practitioner if interventions unsuccessful or patient reports new pain  Outcome: Progressing     Problem: INFECTION - ADULT  Goal: Absence or prevention of progression during hospitalization  Description: INTERVENTIONS:  - Assess and monitor for signs and symptoms of infection  - Monitor lab/diagnostic results  - Monitor all insertion sites, i.e. indwelling lines, tubes, and drains  - Monitor endotracheal if appropriate and nasal secretions for changes in amount and color  - [place] appropriate cooling/warming therapies per order  - Administer medications as ordered  - Instruct and encourage patient and family to use good hand hygiene technique  - Identify and instruct in appropriate isolation precautions for identified infection/condition  Outcome: Progressing  Goal: Absence of fever/infection during neutropenic period  Description: INTERVENTIONS:  - Monitor WBC    Outcome: Progressing     Problem: SAFETY ADULT  Goal: Patient will remain free of falls  Description: INTERVENTIONS:  - Educate patient/family on patient safety including physical limitations  - Instruct patient to call for assistance with activity   - Consult OT/PT to assist with strengthening/mobility   - Keep Call bell within reach  - Keep bed low and locked with side rails adjusted as appropriate  - Keep care items and personal belongings within reach  - Initiate and maintain comfort rounds  - Make Fall Risk Sign visible to staff  - Offer Toileting every 2 Hours,  in advance of need  - Initiate/Maintain bed alarm  - Obtain necessary fall risk management equipment:   - Apply yellow socks and bracelet for high fall risk patients  - Consider moving patient to room near nurses station  Outcome: Progressing  Goal: Maintain or return to baseline ADL function  Description: INTERVENTIONS:  -  Assess patient's ability to carry out ADLs; assess patient's baseline for ADL function and identify physical deficits which impact ability to perform ADLs (bathing, care of mouth/teeth, toileting, grooming, dressing, etc.)  - Assess/evaluate cause of self-care deficits   - Assess range of motion  - Assess patient's mobility; develop plan if impaired  - Assess patient's need for assistive devices and provide as appropriate  - Encourage maximum independence but intervene and supervise when necessary  - Involve family in performance of ADLs  - Assess for home care needs following discharge   - Consider OT consult to assist with ADL evaluation and planning for discharge  - Provide patient education as appropriate  Outcome: Progressing  Goal: Maintains/Returns to pre admission functional level  Description: INTERVENTIONS:  - Perform AM-PAC 6 Click Basic Mobility/ Daily Activity assessment daily.  - Set and communicate daily mobility goal to care team and patient/family/caregiver.   - Collaborate with rehabilitation services on mobility goals if consulted  - Perform Range of Motion 3 times a day.  - Reposition patient every 2 hours.  - Dangle patient 3 times a day  - Stand patient 3 times a day  - Ambulate patient 3 times a day  - Out of bed to chair 3 times a day   - Out of bed for meals 3 times a day  - Out of bed for toileting  - Record patient progress and toleration of activity level   Outcome: Progressing     Problem: DISCHARGE PLANNING  Goal: Discharge to home or other facility with appropriate resources  Description: INTERVENTIONS:  - Identify barriers to discharge w/patient and caregiver  -  Arrange for needed discharge resources and transportation as appropriate  - Identify discharge learning needs (meds, wound care, etc.)  - Arrange for interpretive services to assist at discharge as needed  - Refer to Case Management Department for coordinating discharge planning if the patient needs post-hospital services based on physician/advanced practitioner order or complex needs related to functional status, cognitive ability, or social support system  Outcome: Progressing     Problem: Knowledge Deficit  Goal: Patient/family/caregiver demonstrates understanding of disease process, treatment plan, medications, and discharge instructions  Description: Complete learning assessment and assess knowledge base.  Interventions:  - Provide teaching at level of understanding  - Provide teaching via preferred learning methods  Outcome: Progressing

## 2024-10-28 NOTE — ASSESSMENT & PLAN NOTE
Patient presented with worsening wound on right great toe since recently hitting it against a refrigerator. Hx chronic LE wounds with poor wound healing.  XR R foot: Irregularity of the soft tissues of the great toe as well as a small focus of lucency in the distal aspect of the first distal phalanx suspicious for osteomyelitis   Did not meet sepsis criteria on admission  Wound cultures with growth of Pseudomonas aeruginosa  Podiatry and vascular surgery following  Maintained on IV Cefepime and Vancomycin   Await possible surgical interventions post angiogram  WBAT in post-op shoe. Wound care per nursing.

## 2024-10-28 NOTE — H&P
H&P - Hospitalist   Name: Royce Rene 78 y.o. male I MRN: [id number]  Unit/Bed#: [address] [place], [place] [id number] I Date of Admission: 10/28/2024   Date of Service: 10/28/2024 I Hospital Day: 0       Assessment and Plan  * Osteomyelitis ([facility])  Assessment & Plan  Patient initially presented to [facility] with worsening wound of the right great toe.  Radiographs with concern for soft tissue lucency concerning for distal phalanx osteomyelitis  Previous wound culture with Pseudomonas and initiated on cefepime  Dietary and vascular surgery consultation placed  Will await surgical interventions  Continue cefepime and vancomycin for now  Weightbearing as tolerated in postoperative shoe  Transferred for angiogram, tentatively scheduled for 10/30/2024    Moderate vascular dementia ([facility])  Assessment & Plan  Continue supportive care  Patient's son is primary caregiver    Ambulatory dysfunction  Assessment & Plan  Continue PT and OT consult  Weightbearing as tolerated    Malignant neoplasm metastatic to bone ([facility])  Assessment & Plan  Patient with metastatic prostate cancer  Currently on chemotherapy with Jevtana every 3 weeks  Continue prednisone  Close with Dr. Hernandez, [facility] oncology    PAD (peripheral artery disease) ([facility])  Assessment & Plan  History of peripheral artery disease status post prior stenting and angioplasty.  He has had endarterectomy and profundoplasty by Dr. Cook in July 2021  Lower extremity arterial duplex demonstrating high-grade stenosis versus occlusion in the proximal mid and distal superficial femoral artery.  Vascular surgery and IR consulted  Tentative plan for angiogram at [facility] on 10/3-  Continue with annual dual antiplatelet with Plavix aspirin and statin  Continue pain control    CAD (coronary artery disease)  Assessment & Plan  Continue aspirin, statin and beta-blocker therapy    Chronic combined systolic and diastolic CHF (congestive heart failure) ([facility])  Assessment & Plan  Wt Readings from Last 3  Encounters:   10/28/24 101 kg (222 lb 3.6 oz)   10/28/24 98.9 kg (218 lb 0.6 oz)   10/09/24 100 kg (221 lb 5.5 oz)       No evidence of exacerbation  Continue metoprolol, furosemide  May need Lasix as needed  Last EF 30-35%, GD 1 DD  Continue cardiac diet and daily weights  History of TAVR          Acute on chronic diastolic CHF (congestive heart failure) (HCC)-resolved as of 10/28/2024  Assessment & Plan  Wt Readings from Last 3 Encounters:   10/28/24 101 kg (222 lb 3.6 oz)   10/28/24 98.9 kg (218 lb 0.6 oz)   10/09/24 100 kg (221 lb 5.5 oz)                 Code Status: Level 1 - Full Code     VTE Prophylaxis: Enoxaparin (Lovenox)  / sequential compression device     Discussion with family: Contacted son at 2:25pm as well as niece at 2:26 PM, reached voicemail immediately.  No voicemail left    Anticipated Length of Stay:  Patient will be admitted on an Inpatient basis with an anticipated length of stay of greater than 2 midnights.   Justification for Hospital Stay: Osteomyelitis (HCC)     Total Time for Visit, including Counseling / Coordination of Care: 76 minutes.  Greater than 50% of this total time spent on direct patient counseling and coordination of care.    Chief Complaint:     No chief complaint on file.      History of Present Illness:    Royce Rene is a 78 y.o. male who presents with worsening right foot pain.  The patient has a past medical history of peripheral arterial disease with previous endarterectomy and bovine patch angioplasty in the past.  He had recently struck his right toe against a refrigerator and had significant pain as well as poor wound healing.  He initially presented to the [facility], he was evaluated urgently by the podiatry service.  He was also evaluated by vascular surgery.  Lower extremity Dopplers performed showed significant peripheral arterial disease and he was transferred to [facility] in [place] for angiogram as well possible podiatry  intervention.    Time of evaluation the patient reports having right toe pain.  He has no nausea or vomiting.    Review of Systems:    A complete and comprehensive 14 point organ system review was performed and all other systems are negative other than stated above in the HPI    Past Medical and Surgical History:     Past Medical History:   Diagnosis Date    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    Coronary artery disease 2001    S/p stenting to circumflex and RCA    HFrEF (heart failure with reduced ejection fraction) (HCC) 06/14/2021    High cholesterol     Prostate cancer (HCC)        Past Surgical History:   Procedure Laterality Date    CARDIAC ELECTROPHYSIOLOGY PROCEDURE N/A 12/23/2021    Procedure: Implant ICD - bi ventricular;  Surgeon: Jonas Oviedo MD;  Location: [facility];  Service: Cardiology    CARDIAC SURGERY      IR LOWER EXTREMITY ANGIOGRAM  4/18/2023    GA TEAEC W/WO PATCH GRAFT COMMON FEMORAL Right 7/9/2021    Procedure: ENDARTERECTOMY ARTERIAL FEMORAL;  Surgeon: Serina Cook DO;  Location: [facility];  Service: Vascular       Meds/Allergies:    Prior to Admission medications    Medication Sig Start Date End Date Taking? Authorizing Provider   acetaminophen (TYLENOL) 500 mg tablet Take 2 tablets (1,000 mg total) by mouth 3 (three) times a day as needed for mild pain 3/23/23  Yes Gavin Andrew MD   Accu-Chek FastClix Lancets MISC TEST 2-3 TIMES AS DIRECTED 1/5/24   Historical Provider, MD   Alcohol Swabs (Alcohol Pads) 70 % PADS USE 2-3 TIMES AS DIRECTED. 1/5/24   Historical Provider, MD   aspirin 81 mg chewable tablet Chew 1 tablet (81 mg total) daily 3/14/23 11/20/23  Silverio Bhandari MD   atorvastatin (LIPITOR) 80 mg tablet Take 1 tablet (80 mg total) by mouth daily with dinner 3/14/23   Silverio Bhandari MD   Blood Glucose Monitoring Suppl (Accu-Chek Guide Me) w/Device KIT USE AS DIRECTED 4/22/24   Amanda Keenan MD   cetirizine (ZyrTEC) 5 MG tablet Take 1 tablet  (5 mg total) by mouth daily 3/31/23   Gavin Andrew MD   clopidogrel (PLAVIX) 75 mg tablet Take 1 tablet (75 mg total) by mouth daily 3/14/23 11/20/23  Silverio Bhandari MD   Empagliflozin (Jardiance) 10 MG TABS tablet TAKE 1 TABLET (10 MG TOTAL) BY MOUTH EVERY MORNING 8/23/24   Kelsy Trejo MD   ezetimibe (ZETIA) 10 mg tablet Take 1 tablet (10 mg total) by mouth daily 9/16/24 3/15/25  Kelsy Trejo MD   famotidine (PEPCID) 20 mg tablet Take 1 tablet (20 mg total) by mouth daily 3/14/23 11/1/23  Silverio Bhandari MD   ferrous sulfate 325 (65 Fe) mg tablet Take 1 tablet (325 mg total) by mouth 2 (two) times a day with meals 3/14/23 11/1/23  Silverio Bhandari MD   furosemide (LASIX) 20 mg tablet TAKE 2 TABLETS (40 MG TOTAL) BY MOUTH DAILY 6/7/24   Kelsy Trejo MD   gabapentin (NEURONTIN) 300 mg capsule Take 2 capsules (600 mg total) by mouth 2 (two) times a day 4/15/24   Gavin Andrew MD   glucose blood (Accu-Chek Guide) test strip TEST 2-3 TIMES AS DIRECTED 5/16/24   Kelsy Trejo MD   Lancet Devices (Lancing Device) MISC TID 9/12/23   Amanda Keenan MD   loperamide (IMODIUM) 2 mg capsule Take 1 capsule (2 mg total) by mouth 4 (four) times a day as needed for diarrhea 3/14/23   Silverio Bhandari MD   losartan (COZAAR) 25 mg tablet TAKE 0.5 TABLETS (12.5 MG TOTAL) BY MOUTH DAILY 6/18/24   Kelsy Trejo MD   methocarbamol (ROBAXIN) 500 mg tablet TAKE 1 TABLET (500 MG TOTAL) BY MOUTH 3 (THREE) TIMES A DAY 8/14/24   Gavin Andrew MD   metoprolol succinate (TOPROL-XL) 25 mg 24 hr tablet Take 1 tablet (25 mg total) by mouth daily 5/30/24   Kelsy Trejo MD   ondansetron (ZOFRAN) 4 mg tablet Take 1 tablet (4 mg total) by mouth every 8 (eight) hours as needed for nausea or vomiting 7/26/24   Jung Hernandez MD   polyethylene glycol (GLYCOLAX) 17 GM/SCOOP powder Take 17 g by mouth every other day Monday Wednesday Friday  Patient taking differently: Take 17 g by mouth  "every other day    dissolve in 8 oz liquid of choice 24   Gavin Andrew MD   potassium chloride (Klor-Con M20) 20 mEq tablet TAKE 1 TABLET (20 MEQ TOTAL) BY MOUTH DAILY 10/9/24 4/7/25  Anne Chandra MD   predniSONE 5 mg tablet Take 1 tablet (5 mg total) by mouth 2 (two) times a day with meals 24   Jung Hernandez MD   senna (SENOKOT) 8.6 MG tablet Take 1 tablet (8.6 mg total) by mouth 2 (two) times a day To prevent constipation.  Hold one dose for loose stool. 24   Gavin Andrew MD     I have reviewed home medications with patient personally.    Allergies: No Known Allergies    Social History:     Marital Status:    Occupation: Retired    Substance Use History:   Social History     Substance and Sexual Activity   Alcohol Use Not Currently     Social History     Tobacco Use   Smoking Status Former    Current packs/day: 0.00    Types: Cigarettes    Start date: 1962    Quit date: 2002    Years since quittin.3    Passive exposure: Past   Smokeless Tobacco Never     Social History     Substance and Sexual Activity   Drug Use Not Currently       Family History:    Family History   Problem Relation Age of Onset    Cancer Mother        Physical Exam:     Vitals:   Blood Pressure: 116/69 (10/28/24 1213)  Pulse: 77 (10/28/24 1213)  Temperature: 98.8 °F (37.1 °C) (10/28/24 1213)  Temp Source: Oral (10/28/24 1213)  Respirations: 20 (10/28/24 1213)  Height: 5' 7\" (170.2 cm) (10/28/24 1213)  Weight - Scale: 101 kg (222 lb 3.6 oz) (10/28/24 1213)  SpO2: 95 % (10/28/24 1213)      General: well appearing, no acute distress  HEENT: atraumatic, PERRLA, moist mucosa, normal pharynx, normal tonsils and adenoids, normal tongue, no fluid in sinuses  Neck: Trachea midline, no carotid bruit, no masses  Respiratory: normal chest wall expansion, CTA B, no r/r/w, no rubs  Cardiovascular: RRR, no m/r/g, Normal S1 and S2  Abdomen: Soft, non-tender, non-distended, normal " bowel sounds in all quadrants, no hepatosplenomegaly, no tympany  Rectal: deferred  Musculoskeletal: Moves all integumentary: Right foot wound  Heme/Lymph: no lymphadenopathy, no bruises  Neurological: Cranial Nerves II-XII grossly intact  Psychiatric: cooperative with normal mood, affect, and cognition    Additional Data:     Lab Results: Laboratory studies reviewed for today    Results from last 7 days   Lab Units 10/27/24  0529 10/26/24  0453   WBC Thousand/uL 9.22 7.64   HEMOGLOBIN g/dL 12.1 11.3*   HEMATOCRIT % 38.1 35.2*   PLATELETS Thousands/uL 169 152   BANDS PCT %  --  1   SEGS PCT % 79*  --    LYMPHO PCT % 12* 12*   MONO PCT % 7 7   EOS PCT % 0 0     Results from last 7 days   Lab Units 10/27/24  0529 10/26/24  0453   SODIUM mmol/L 137 139   POTASSIUM mmol/L 4.6 3.9   CHLORIDE mmol/L 107 108   CO2 mmol/L 21 23   BUN mg/dL 21 21   CREATININE mg/dL 0.72 0.73   ANION GAP mmol/L 9 8   CALCIUM mg/dL 8.5 8.5   ALBUMIN g/dL  --  3.6   TOTAL BILIRUBIN mg/dL  --  0.86   ALK PHOS U/L  --  63   ALT U/L  --  24   AST U/L  --  15   GLUCOSE RANDOM mg/dL 89 86         Results from last 7 days   Lab Units 10/28/24  1109 10/28/24  0659 10/27/24  2102 10/27/24  1550 10/27/24  1126 10/27/24  0708 10/26/24  2037 10/26/24  1625 10/26/24  1126 10/26/24  0725 10/25/24  2112 10/25/24  1627   POC GLUCOSE mg/dl 102 86 139 139 108 92 122 127 107 84 131 129     Results from last 7 days   Lab Units 10/23/24  0543   HEMOGLOBIN A1C % 5.5     Results from last 7 days   Lab Units 10/22/24  1536   PROCALCITONIN ng/ml 0.07           Imaging: Results Review Statement: I personally reviewed the following image studies/reports in PACS and discussed pertinent findings with Radiology: Ultrasound(s). My interpretation of the radiology images/reports is: Lower extremity Doppler showing severe peripheral arterial disease.    No orders to display       EKG, Pathology, and Other Studies Reviewed on Admission:    [facility] from .  [facility]    Allscripts / Epic Records Reviewed: Yes    ** Please Note: This note was completed in part utilizing Nuance Dragon Medical One Software.  Grammatical errors, random word insertions, spelling mistakes, and incomplete sentences may be an occasional consequence of this system secondary to software limitations, ambient noise, and hardware issues.  If you have any questions or concerns about the content, text, or information contained within the body of this dictation, please contact the provider for clarification.**

## 2024-10-28 NOTE — ASSESSMENT & PLAN NOTE
Patient with metastatic castration resistant prostate cancer  Currently on chemotherapy with Jevtana every 3 weeks  On Prednisone   Follows with Dr. Hernandez for Oncology outpatient

## 2024-10-28 NOTE — ASSESSMENT & PLAN NOTE
Wt Readings from Last 3 Encounters:   10/30/24 99.8 kg (220 lb)   10/28/24 98.9 kg (218 lb 0.6 oz)   10/09/24 100 kg (221 lb 5.5 oz)

## 2024-10-29 ENCOUNTER — APPOINTMENT (OUTPATIENT)
Dept: LAB | Facility: HOSPITAL | Age: 79
End: 2024-10-29

## 2024-10-29 DIAGNOSIS — Z76.89 PREVENTION OF CHEMOTHERAPY-INDUCED NEUTROPENIA: ICD-10-CM

## 2024-10-29 DIAGNOSIS — C61 PROSTATE CANCER (HCC): ICD-10-CM

## 2024-10-29 PROBLEM — L97.512 ULCER OF RIGHT FOOT WITH FAT LAYER EXPOSED (HCC): Status: ACTIVE | Noted: 2024-10-29

## 2024-10-29 PROBLEM — L97.521 ULCER OF LEFT FOOT, LIMITED TO BREAKDOWN OF SKIN (HCC): Status: ACTIVE | Noted: 2024-10-29

## 2024-10-29 PROBLEM — I99.8 ISCHEMIC PAIN OF FOOT AT REST: Status: ACTIVE | Noted: 2024-10-29

## 2024-10-29 PROBLEM — M79.673 ISCHEMIC PAIN OF FOOT AT REST: Status: ACTIVE | Noted: 2024-10-29

## 2024-10-29 LAB
ANION GAP SERPL CALCULATED.3IONS-SCNC: 5 MMOL/L (ref 4–13)
BUN SERPL-MCNC: 20 MG/DL (ref 5–25)
CALCIUM SERPL-MCNC: 7.9 MG/DL (ref 8.4–10.2)
CHLORIDE SERPL-SCNC: 111 MMOL/L (ref 96–108)
CO2 SERPL-SCNC: 22 MMOL/L (ref 21–32)
CREAT SERPL-MCNC: 0.7 MG/DL (ref 0.6–1.3)
ERYTHROCYTE [DISTWIDTH] IN BLOOD BY AUTOMATED COUNT: 14.9 % (ref 11.6–15.1)
GFR SERPL CREATININE-BSD FRML MDRD: 90 ML/MIN/1.73SQ M
GLUCOSE SERPL-MCNC: 106 MG/DL (ref 65–140)
GLUCOSE SERPL-MCNC: 106 MG/DL (ref 65–140)
GLUCOSE SERPL-MCNC: 140 MG/DL (ref 65–140)
GLUCOSE SERPL-MCNC: 87 MG/DL (ref 65–140)
GLUCOSE SERPL-MCNC: 87 MG/DL (ref 65–140)
HCT VFR BLD AUTO: 32.4 % (ref 36.5–49.3)
HGB BLD-MCNC: 10.1 G/DL (ref 12–17)
MCH RBC QN AUTO: 30.5 PG (ref 26.8–34.3)
MCHC RBC AUTO-ENTMCNC: 31.2 G/DL (ref 31.4–37.4)
MCV RBC AUTO: 98 FL (ref 82–98)
PLATELET # BLD AUTO: 150 THOUSANDS/UL (ref 149–390)
PMV BLD AUTO: 9.6 FL (ref 8.9–12.7)
POTASSIUM SERPL-SCNC: 4 MMOL/L (ref 3.5–5.3)
RBC # BLD AUTO: 3.31 MILLION/UL (ref 3.88–5.62)
SODIUM SERPL-SCNC: 138 MMOL/L (ref 135–147)
WBC # BLD AUTO: 8.31 THOUSAND/UL (ref 4.31–10.16)

## 2024-10-29 PROCEDURE — 97110 THERAPEUTIC EXERCISES: CPT

## 2024-10-29 PROCEDURE — NC001 PR NO CHARGE: Performed by: RADIOLOGY

## 2024-10-29 PROCEDURE — 97163 PT EVAL HIGH COMPLEX 45 MIN: CPT

## 2024-10-29 PROCEDURE — 99232 SBSQ HOSP IP/OBS MODERATE 35: CPT

## 2024-10-29 PROCEDURE — 99222 1ST HOSP IP/OBS MODERATE 55: CPT | Performed by: PODIATRIST

## 2024-10-29 PROCEDURE — 82948 REAGENT STRIP/BLOOD GLUCOSE: CPT

## 2024-10-29 PROCEDURE — 80048 BASIC METABOLIC PNL TOTAL CA: CPT | Performed by: INTERNAL MEDICINE

## 2024-10-29 PROCEDURE — 85027 COMPLETE CBC AUTOMATED: CPT | Performed by: INTERNAL MEDICINE

## 2024-10-29 RX ADMIN — CEFEPIME HYDROCHLORIDE 2000 MG: 2 INJECTION, SOLUTION INTRAVENOUS at 02:27

## 2024-10-29 RX ADMIN — FUROSEMIDE 20 MG: 20 TABLET ORAL at 09:06

## 2024-10-29 RX ADMIN — ATORVASTATIN CALCIUM 80 MG: 80 TABLET, FILM COATED ORAL at 16:27

## 2024-10-29 RX ADMIN — ASPIRIN 81 MG CHEWABLE TABLET 81 MG: 81 TABLET CHEWABLE at 09:05

## 2024-10-29 RX ADMIN — POTASSIUM CHLORIDE 20 MEQ: 1500 TABLET, EXTENDED RELEASE ORAL at 09:05

## 2024-10-29 RX ADMIN — EZETIMIBE 10 MG: 10 TABLET ORAL at 09:05

## 2024-10-29 RX ADMIN — ENOXAPARIN SODIUM 40 MG: 40 INJECTION SUBCUTANEOUS at 09:06

## 2024-10-29 RX ADMIN — PREDNISONE 5 MG: 5 TABLET ORAL at 09:05

## 2024-10-29 RX ADMIN — METHOCARBAMOL TABLETS 500 MG: 500 TABLET, COATED ORAL at 18:12

## 2024-10-29 RX ADMIN — OXYCODONE HYDROCHLORIDE 5 MG: 5 TABLET ORAL at 20:06

## 2024-10-29 RX ADMIN — METOPROLOL SUCCINATE 25 MG: 25 TABLET, EXTENDED RELEASE ORAL at 09:05

## 2024-10-29 RX ADMIN — CEFEPIME HYDROCHLORIDE 2000 MG: 2 INJECTION, SOLUTION INTRAVENOUS at 14:39

## 2024-10-29 RX ADMIN — GABAPENTIN 600 MG: 300 CAPSULE ORAL at 18:06

## 2024-10-29 RX ADMIN — LOSARTAN POTASSIUM 12.5 MG: 25 TABLET, FILM COATED ORAL at 09:05

## 2024-10-29 RX ADMIN — OXYCODONE HYDROCHLORIDE 5 MG: 5 TABLET ORAL at 11:06

## 2024-10-29 RX ADMIN — OXYCODONE HYDROCHLORIDE 5 MG: 5 TABLET ORAL at 03:35

## 2024-10-29 RX ADMIN — GABAPENTIN 600 MG: 300 CAPSULE ORAL at 09:06

## 2024-10-29 RX ADMIN — VANCOMYCIN HYDROCHLORIDE 1000 MG: 1 INJECTION, SOLUTION INTRAVENOUS at 09:04

## 2024-10-29 RX ADMIN — CLOPIDOGREL 75 MG: 75 TABLET ORAL at 09:05

## 2024-10-29 RX ADMIN — VANCOMYCIN HYDROCHLORIDE 1000 MG: 1 INJECTION, SOLUTION INTRAVENOUS at 20:34

## 2024-10-29 RX ADMIN — PREDNISONE 5 MG: 5 TABLET ORAL at 16:27

## 2024-10-29 NOTE — WOUND OSTOMY CARE
"Progress Note - Wound   Royce BAER Free 78 y.o. male MRN: [id number]  Unit/Bed#: [address] [place] [address] Encounter: [id number]        Assessment:   This is a 78 year old male patient admitted today with osteomyelitis. Patient was AAO x 3 and agreeable to have wound visit done. He turned and repositioned independently and ambulates to bathroom with assist. He has no issues with incontinence.    Findings:  1-Full thickness arterial wound to right great toe with black eschar, pink granulation tissue, no drainage. Edema and purple discoloration noted to periwound. Pulses to bilateral feet non-palpable - posterior tibial pulse audible with doppler bilaterally as per Primary RN - sites marked with skin marker. Orders in place for wound care and Podiatry consulted.  2-Full thickness wound to left lateral foot with dry brown/black eschar and no drainage - orders in place for wound care.  3-Full thickness traumatic wound to left side of face due to patient picking wound with pink granulation tissue, yellow slough and no drainage. Patient stated that he has had it for many years and has not been seen by Dermatologist. Would recommend outpatient follow-up with Dermatology due to atypical appearance and thickening of periwound.  4-Bilateral heels and sacrobuttocks intact - orders in place for skin care and for prevention.    Wound Care Plan:   1-Cleanse wound to right great toe and left lateral foot with NSS & pat dry. Paint wounds with betadine, cover with gauze or ABD, rolled gauze and tape. Change every other day & prn soilage/dislodgement.  2-Cleanse wound to left side of face with NSS & pat dry. Apply 3M No Sting to periwound then apply 2x2\" hydrocolloid dressing (left at bedside with wound care supplies). Change every other day & prn soilage/dislodgement.  3-Apply Prevent barrier cream to bilateral sacrum, buttock and heels BID and PRN  4-Float heels on 2 pillows to offload pressure so heels are not in contact with mattress or " pillows.  5-Ehob pressure redistribution cushion in chair when out of bed. Avoid prolonged sitting.  6-Moisturize skin daily with skin nourishing cream.  7-Turn/reposition q2h or when medically stable for pressure re-distribution on skin.     Wound 10/22/24 Toe D1, great Right (Active)   Wound Image   10/28/24 1455   Wound Description Black;Eschar;Granulation tissue;Pink 10/28/24 1455   Selene-wound Assessment Edema;Purple 10/28/24 1455   Wound Length (cm) 0.7 cm 10/28/24 1455   Wound Width (cm) 1.7 cm 10/28/24 1455   Wound Depth (cm) 0.1 cm 10/28/24 1455   Wound Surface Area (cm^2) 1.19 cm^2 10/28/24 1455   Wound Volume (cm^3) 0.119 cm^3 10/28/24 1455   Calculated Wound Volume (cm^3) 0.12 cm^3 10/28/24 1455   Change in Wound Size % 20 10/28/24 1455   Drainage Amount None 10/28/24 1455   Treatments Cleansed 10/28/24 1455   Dressing Betadine;Dry dressing;Gauze 10/28/24 1455   Dressing Changed New 10/28/24 1455   Dressing Status Clean;Dry;Intact 10/28/24 1455       Wound 10/22/24 Foot Left;Lateral (Active)   Wound Image   10/28/24 1458   Wound Description Dry;Black;Brown;Eschar 10/28/24 1458   Selene-wound Assessment Intact;Scar Tissue 10/28/24 1458   Wound Length (cm) 2.4 cm 10/28/24 1458   Wound Width (cm) 2 cm 10/28/24 1458   Wound Depth (cm) 0.1 cm 10/28/24 1458   Wound Surface Area (cm^2) 4.8 cm^2 10/28/24 1458   Wound Volume (cm^3) 0.48 cm^3 10/28/24 1458   Calculated Wound Volume (cm^3) 0.48 cm^3 10/28/24 1458   Change in Wound Size % 20 10/28/24 1458   Drainage Amount None 10/28/24 1458   Non-staged Wound Description Full thickness 10/28/24 1458   Treatments Cleansed 10/28/24 1458   Dressing Betadine;ABD;Dry dressing 10/28/24 1458   Dressing Changed New 10/28/24 1458   Dressing Status Clean;Dry;Intact 10/28/24 1458       Wound 10/24/24 Face Left (Active)   Wound Image   10/28/24 1500   Wound Description Granulation tissue;Beefy red 10/28/24 1500   Selene-wound Assessment Intact 10/28/24 1500   Wound Length (cm) 1 cm  10/28/24 1500   Wound Width (cm) 1 cm 10/28/24 1500   Wound Depth (cm) 0.1 cm 10/28/24 1500   Wound Surface Area (cm^2) 1 cm^2 10/28/24 1500   Wound Volume (cm^3) 0.1 cm^3 10/28/24 1500   Calculated Wound Volume (cm^3) 0.1 cm^3 10/28/24 1500   Drainage Amount None 10/28/24 1500   Non-staged Wound Description Full thickness 10/28/24 1500   Treatments Cleansed 10/28/24 1500   Dressing Hydrocolloid;Protective barrier 10/28/24 1500   Dressing Changed New 10/28/24 1500   Dressing Status Clean;Dry;Intact 10/28/24 1500     Discussed assessment findings, and plan of care/recommendations with Keyonna NAYAK.    Wound care will follow along with patient throughout admission, please call or text with questions and concerns.    Recommendations written as orders.  Anne Anders MSN, RN, CWON

## 2024-10-29 NOTE — DISCHARGE INSTR - OTHER ORDERS
"Wound Care Plan:   1-Follow orders from Podiatry/Surgery for right 1st toe wound.  2-Cleanse wound to left side of face with normal saline or mild soap and water & pat dry. Apply 3M No Sting to periwound then apply 2x2\" hydrocolloid dressing . Change every 3 days & as needed for soilage/dislodgement.  3-Apply Prevent barrier cream or equivalent to bilateral sacrum, buttock and heels 2x/day and as needed.  4-Float heels on 2 pillows to offload pressure so heels are not in contact with mattress or pillows.  5-Use pressure redistribution cushion in chair when out of bed. Avoid prolonged sitting.  6-Moisturize skin daily with skin nourishing cream.  7-Turn/reposition every 2 hrs in bed and every hr when out of bed to chair for pressure re-distribution on skin.   8-Follow up with Dermatology or with [facility] or [facility] for facial wound. Call Central Scheduling for appointment:   [phone].   "

## 2024-10-29 NOTE — UTILIZATION REVIEW
Initial Clinical Review    Admission: Date/Time/Statement:   Admission Orders (From admission, onward)       Ordered        10/28/24 1243  INPATIENT ADMISSION  Once                          Orders Placed This Encounter   Procedures    INPATIENT ADMISSION     Standing Status:   Standing     Number of Occurrences:   1     Order Specific Question:   Level of Care     Answer:   Med Surg [16]     Order Specific Question:   Bed Type     Answer:   Lexie [4]     Order Specific Question:   Estimated length of stay     Answer:   More than 2 Midnights     Order Specific Question:   Certification     Answer:   I certify that inpatient services are medically necessary for this patient for a duration of greater than two midnights. See H&P and MD Progress Notes for additional information about the patient's course of treatment.     Initial Presentation: to [facility] 10/22/24. Transferred to [facility] 10/28/22     Date: 10/28/24  Day 7:   Has surpassed a 2nd midnight with active treatments and services.  78 yom presents with worsening right foot pain.  The patient has a past medical history of peripheral arterial disease with previous endarterectomy and bovine patch angioplasty in the past.  He had recently struck his right toe against a refrigerator and had significant pain as well as poor wound healing.  He initially presented to the [facility] on 10/22/24, he was evaluated urgently by the podiatry service.  He was also evaluated by vascular surgery.  Lower extremity Dopplers performed showed significant peripheral arterial disease and he was transferred to [facility] in [place] for angiogram as well possible podiatry intervention.   Admitted to inpatient status for osteomyelitis.  IVABT continued, podiatry, vascular surgery & IR consulted.    Anticipated Length of Stay/Certification Statement:   Patient will be admitted on an Inpatient basis with an anticipated length of stay of greater than 2  midnights.  Justification for Hospital Stay: Osteomyelitis     Scheduled Medications:  aspirin, 81 mg, Oral, Daily  atorvastatin, 80 mg, Oral, Daily With Dinner  cefepime, 2,000 mg, Intravenous, Q12H  clopidogrel, 75 mg, Oral, Daily  enoxaparin, 40 mg, Subcutaneous, Q24H DEWAYNE  ezetimibe, 10 mg, Oral, Daily  furosemide, 20 mg, Oral, Daily  gabapentin, 600 mg, Oral, BID  insulin lispro, 1-5 Units, Subcutaneous, TID AC  losartan, 12.5 mg, Oral, Daily  metoprolol succinate, 25 mg, Oral, Daily  potassium chloride, 20 mEq, Oral, Daily  predniSONE, 5 mg, Oral, BID With Meals  vancomycin, 10 mg/kg, Intravenous, Q12H    PRN Meds:  acetaminophen, 650 mg, Oral, Q6H PRN  bismuth subsalicylate, 524 mg, Oral, Q6H PRN  methocarbamol, 500 mg, Oral, Q8H PRN  oxyCODONE, 5 mg, Oral, Q4H PRN      ED Triage Vitals [10/28/24 1213]   Temperature Pulse Respirations Blood Pressure SpO2 Pain Score   98.8 °F (37.1 °C) 77 20 116/69 95 % No Pain     Weight (last 2 days)       Date/Time Weight    10/29/24 0542 99.8 (220)    10/28/24 12:13:14 101 (222.22)            Vital Signs (last 3 days)       Date/Time Temp Pulse Resp BP MAP (mmHg) SpO2 O2 Device Patient Position - Orthostatic VS [place] Coma Scale Score Pain    10/29/24 08:09:43 98.3 °F (36.8 °C) 72 16 151/77 102 95 % -- -- -- --    10/29/24 0335 -- -- -- -- -- -- -- -- -- 8    10/29/24 02:32:41 -- 70 -- 111/55 74 94 % -- -- -- --    10/28/24 22:24:27 98.4 °F (36.9 °C) 72 14 110/55 73 95 % -- -- -- --    10/28/24 2100 -- -- -- -- -- -- None (Room air) -- 15 No Pain    10/28/24 19:01:23 98.2 °F (36.8 °C) 75 16 108/54 72 94 % -- -- -- --    10/28/24 15:15:36 98.3 °F (36.8 °C) 89 18 115/70 85 95 % -- -- -- --    10/28/24 1440 -- -- -- -- -- -- -- -- -- 8    10/28/24 1215 -- -- -- -- -- -- -- -- 15 --    10/28/24 12:13:14 98.8 °F (37.1 °C) 77 20 116/69 85 95 % -- Lying -- No Pain              Pertinent Labs/Diagnostic Test Results:   Radiology:  No orders to display     Cardiology:  No orders to  "display     GI:  No orders to display           Results from last 7 days   Lab Units 10/29/24  0501 10/27/24  0529 10/26/24  0453 10/25/24  0507 10/24/24  0527 10/23/24  0543 10/22/24  1536   WBC Thousand/uL 8.31 9.22 7.64 8.22 6.09   < > 9.52   HEMOGLOBIN g/dL 10.1* 12.1 11.3* 11.8* 11.2*   < > 12.1   HEMATOCRIT % 32.4* 38.1 35.2* 36.8 35.8*   < > 37.8   PLATELETS Thousands/uL 150 169 152 163 155   < > 169   TOTAL NEUT ABS Thousands/µL  --  7.23  --   --   --   --   --    BANDS PCT %  --   --  1  --   --   --  3    < > = values in this interval not displayed.         Results from last 7 days   Lab Units 10/29/24  0501 10/27/24  0529 10/26/24  0453 10/25/24  0507 10/24/24  0527   SODIUM mmol/L 138 137 139 141 139   POTASSIUM mmol/L 4.0 4.6 3.9 4.2 3.9   CHLORIDE mmol/L 111* 107 108 109* 109*   CO2 mmol/L 22 21 23 25 23   ANION GAP mmol/L 5 9 8 7 7   BUN mg/dL 20 21 21 20 18   CREATININE mg/dL 0.70 0.72 0.73 0.75 0.74   EGFR ml/min/1.73sq m 90 89 88 87 88   CALCIUM mg/dL 7.9* 8.5 8.5 8.7 7.9*     Results from last 7 days   Lab Units 10/26/24  0453 10/23/24  0543 10/22/24  1536   AST U/L 15 11* 12*   ALT U/L 24 18 20   ALK PHOS U/L 63 58 69   TOTAL PROTEIN g/dL 5.5* 5.6* 6.2*   ALBUMIN g/dL 3.6 3.6 3.9   TOTAL BILIRUBIN mg/dL 0.86 0.80 0.54     Results from last 7 days   Lab Units 10/29/24  0751 10/28/24  2046 10/28/24  1552 10/28/24  1109 10/28/24  0659 10/27/24  2102 10/27/24  1550 10/27/24  1126 10/27/24  0708 10/26/24  2037 10/26/24  1625 10/26/24  1126   POC GLUCOSE mg/dl 87 190* 165* 102 86 139 139 108 92 122 127 107     Results from last 7 days   Lab Units 10/29/24  0501 10/27/24  0529 10/26/24  0453 10/25/24  0507 10/24/24  0527 10/23/24  0543 10/22/24  1536   GLUCOSE RANDOM mg/dL 87 89 86 84 73 85 81         Results from last 7 days   Lab Units 10/23/24  0543   HEMOGLOBIN A1C % 5.5   EAG mg/dl 111     No results found for: \"BETA-HYDROXYBUTYRATE\"                                   Results from last 7 days   Lab " Units 10/22/24  1536   PROCALCITONIN ng/ml 0.07                                         Results from last 7 days   Lab Units 10/23/24  0543   CRP mg/L 2.4   SED RATE mm/hour 7                                             Results from last 7 days   Lab Units 10/23/24  0931   GRAM STAIN RESULT  No Polys or Bacteria seen   WOUND CULTURE  2+ Growth of Pseudomonas aeruginosa*  1+ Growth of             Results from last 7 days   Lab Units 10/24/24  0527   VANCOMYCIN RM ug/mL 14.6       Past Medical History:   Diagnosis Date    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    Coronary artery disease 2001    S/p stenting to circumflex and RCA    HFrEF (heart failure with reduced ejection fraction) (HCC) 06/14/2021    High cholesterol     Prostate cancer (HCC)      Present on Admission:   (Resolved) Acute on chronic diastolic CHF (congestive heart failure) (HCC)   Ambulatory dysfunction   CAD (coronary artery disease)   Chronic combined systolic and diastolic CHF (congestive heart failure) (HCC)   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)   Moderate vascular dementia (HCC)   PAD (peripheral artery disease) (HCC)   Osteomyelitis (HCC)      Admitting Diagnosis: Osteomyelitis (HCC)  Age/Sex: 78 y.o. male    Network Utilization Review Department  ATTENTION: Please call with any questions or concerns to [phone] and carefully listen to the prompts so that you are directed to the right person. All voicemails are confidential.   For Discharge needs, contact Care Management DC Support Team at [phone] opt. 2  Send all requests for admission clinical reviews, approved or denied determinations and any other requests to dedicated fax number below belonging to the campus where the patient is receiving treatment. List of dedicated fax numbers for the Facilities:  FACILITY NAME UR FAX NUMBER   ADMISSION DENIALS (Administrative/Medical Necessity) [phone]   DISCHARGE SUPPORT TEAM (NETWORK) [phone]   PARENT CHILD HEALTH  (Maternity/NICU/Pediatrics) [phone]   [facility] [phone]   [facility] [phone]   Psychiatric hospital [phone]   [facility] [phone]   [facility] [phone]   [facility] [phone]   [facility] [phone]   [facility] [phone]   [facility] [phone]   [facility] [phone]   General acute hospital [phone]   [facility] [phone]

## 2024-10-29 NOTE — CASE MANAGEMENT
Case Management Assessment & Discharge Planning Note    Patient name Royce BAER Free  Location [address] [place] [address]/[address] [place] [address]-* MRN [id number]  : 1945 Date 10/29/2024       Current Admission Date: 10/28/2024  Current Admission Diagnosis:Osteomyelitis (HCC)   Patient Active Problem List    Diagnosis Date Noted Date Diagnosed    Osteomyelitis (HCC) 10/22/2024     Prevention of chemotherapy-induced neutropenia 2024     Frail elder // vulnerable adult 2024     Elevated liver enzymes 2024     Proctitis 2024     Class 2 severe obesity due to excess calories with serious comorbidity and body mass index (BMI) of 36.0 to 36.9 in adult (HCC) 2023     Moderate vascular dementia ([facility]) 2023     Ambulatory dysfunction 2022     Peripheral neuropathy 2022     Financial problems 2022     Cancer-related pain 05/10/2022     Palliative care patient 05/10/2022     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) 2022     Embolism and 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 ([facility]) 2022     Depression, recurrent (HCC) 2022     Port-A-Cath in place 10/05/2021     PAD (peripheral artery disease) ([facility]) 2021     Urinary retention 2021     CAD (coronary artery disease) 07/10/2021     Ischemic leg pain 2021     Prostate cancer ([facility]) 2021     Ischemic cardiomyopathy 2021     Chronic combined systolic and diastolic CHF (congestive heart failure) ([facility]) 2021     S/P AVR 2021     Anemia 2021       LOS (days): 1  Geometric Mean LOS (GMLOS) (days): 4.2  Days to GMLOS:3.3     OBJECTIVE:    Risk of Unplanned Readmission Score: 20.9         Current admission status: Inpatient     Preferred Pharmacy:   [facility], [facility] [place] -  [facility]   [facility] [address]  Phone: [phone] Fax: [phone]    [facility] [place], [place] [facility] [address] [place] [facility]  [address]  [place] [address]  Phone: [phone] Fax: [phone]    [facility] [place] - [place] - [address]  [address]  [address]  [place] [address]  Phone: [phone] Fax: [phone]    [facility]. [facility] [address] [facility]  [address] [facility] [address]  Phone: [phone] Fax: [phone]    Primary Care Provider: Anne Chandra MD    Primary Insurance: [facility]  Secondary Insurance: [employer]    ASSESSMENT:  Active Health Care Proxies    There are no active Health Care Proxies on file.    Patient Information  Admitted from:: Home  Mental Status: Alert  During Assessment patient was accompanied by: Not accompanied during assessment  Assessment information provided by:: Patient  Primary Caregiver: Self  Support Systems: Self, Son, Daughter  County of Residence: [place]  What city do you live in[place]  Home entry access options. Select all that apply.: No steps to enter home  Type of Current Residence: 2 story home  Upon entering residence, is there a bedroom on the main floor (no further steps)?: No  A bedroom is located on the following floor levels of residence (select all that apply):: 2nd Floor  Upon entering residence, is there a bathroom on the main floor (no further steps)?: No  Indicate which floors of current residence have a bathroom (select all the apply):: 2nd Floor  Number of steps to 2nd floor from main floor: One Flight (chair lift)  Living Arrangements: Lives w/ Son (Son lives in attic, works night shift)  Is patient a ?: Yes  Is patient active with [facility] ( Affairs)?: Yes ([facility])  Is patient service connected?: Yes (Per prior CM notes- does not have SNF benefit)    Activities of Daily Living Prior to Admission  Functional Status: Independent  Completes ADLs independently?: Yes  Ambulates independently?: Yes  Does patient use assisted devices?: Yes  Assisted  Devices (DME) used: Electric wheelchair, Stair Chair/[place]  Does patient currently own DME?: Yes  What DME does the patient currently own?: Electric Wheelchair, Stair Chair/Glide  Does patient have a history of Outpatient Therapy (PT/OT)?: No  Does the patient have a history of Short-Term Rehab?: Yes ([facility])  Does patient have a history of HHC?: Yes  Does patient currently have HHC?: Yes    Current Home Health Care  Type of Current Home Care Services: Meals on Wheels, Nurse visit  Home Health Agency Name:: [facility]  Current Home Health Follow-Up Provider:: PCP    Patient Information Continued  Income Source: SSI/SSD  Does patient have prescription coverage?: Yes  Does patient receive dialysis treatments?: No  Does patient have a history of substance abuse?: No  Does patient have a history of Mental Health Diagnosis?: No     Social Determinants of Health (SDOH)      Flowsheet Row Most Recent Value   Housing Stability    In the last 12 months, was there a time when you were not able to pay the mortgage or rent on time? N   In the past 12 months, how many times have you moved where you were living? 0   At any time in the past 12 months, were you homeless or living in a shelter (including now)? N   Transportation Needs    In the past 12 months, has lack of transportation kept you from medical appointments or from getting medications? no   In the past 12 months, has lack of transportation kept you from meetings, work, or from getting things needed for daily living? No   Food Insecurity    Within the past 12 months, you worried that your food would run out before you got the money to buy more. Never true   Within the past 12 months, the food you bought just didn't last and you didn't have money to get more. Never true   Utilities    In the past 12 months has the electric, gas, oil, or water company threatened to shut off services in your home? No          DISCHARGE DETAILS:    Discharge planning discussed  with:: Patient  Freedom of Choice: Yes  Comments - Freedom of Choice: Choice is to return home with [facility] HHC services through [facility].  CM contacted family/caregiver?: Yes (Voicemail left for son Rodrigue introducing role and requesting callback to discuss discharge plan.)  Were Treatment Team discharge recommendations reviewed with patient/caregiver?: Yes  Did patient/caregiver verbalize understanding of patient care needs?: Yes  Were patient/caregiver advised of the risks associated with not following Treatment Team discharge recommendations?: Yes    Contacts  Patient Contacts: Rodrigue Rene (son)  Relationship to Patient:: Family  Contact Method: Phone  Phone Number: [phone]  Reason/Outcome: Discharge Planning, Emergency Contact, Continuity of Care    Requested Home Health Care         Is the patient interested in HHC at discharge?: Yes  Home Health Discipline requested:: Nursing, Physical Therapy  Home Health Agency [facility]:: [facility]  Home Health Follow-Up Provider:: KENDALL  Home Health Services Needed:: Evaluate Functional Status and Safety, Gait/ADL Training, Heart Failure Management, Strengthening/Theraputic Exercises to Improve Function, Wound/Ostomy Care  Homebound Criteria Met:: Uses an Assist Device (i.e. cane, walker, etc), Requires the Assistance of Another Person for Safe Ambulation or to Leave the Home  Supporting Clincal Findings:: Limited Endurance, Fatigues Easliy in Short Distances     Other Referral/Resources/Interventions Provided:  Interventions: HHC  Referral Comments: [facility] referral sent via [facility] to [facility].     Treatment Team Recommendation: Home with Home Health Care (pending progress)  Discharge Destination Plan:: Home with Home Health Care (pending progress)  Transport at Discharge : Family

## 2024-10-29 NOTE — PROGRESS NOTES
Progress Note - Hospitalist   Name: Royce Rene 78 y.o. male I MRN: [id number]  Unit/Bed#: [address] [place] [address]-02 I Date of Admission: 10/28/2024   Date of Service: 10/29/2024 I Hospital Day: 1    Assessment & Plan  Osteomyelitis ([facility])  Patient originally presented to [facility] with worsening wound on right great toe since hitting it recently.  XR R foot suspicious for osteomyelitis of great toe  Wound culture grew pseudomonas  Currently on cefepime and vancomycin  Podiatry and vascular surgery consulted  Plan for angiogram tomorrow  Possible surgical intervention per podiatry  PAD (peripheral artery disease) ([facility])  History of peripheral artery disease s/p prior stenting and angioplasty.  He has had endarterectomy and profundoplasty by Dr. Cook in July 2021  Lower extremity arterial duplex demonstrating high-grade stenosis versus occlusion in the proximal mid and distal superficial femoral artery  Vascular surgery and IR consulted  Plan for anigiogram tomorrow  Continue aspirin, plavix, statin  Chronic combined systolic and diastolic CHF (congestive heart failure) ([facility])  Wt Readings from Last 3 Encounters:   10/29/24 99.8 kg (220 lb)   10/28/24 98.9 kg (218 lb 0.6 oz)   10/09/24 100 kg (221 lb 5.5 oz)     ECHO 8/2023: EF 30-35%, systolic function severely reduced, diastolic function abnormal consistent with grade I relaxation  Continue Lasix 20 mg daily, Toprol XL 25 mg daily  Appears euvolemic  Monitor Is/Os and daily weights  CAD (coronary artery disease)  Continue aspirin, statin, plavix, and beta-blocker therapy  Malignant neoplasm metastatic to bone ([facility])  Patient with metastatic prostate cancer  Currently on chemotherapy with Jevtana every 3 weeks  Continue prednisone  Follows with Dr. Hernandez, [facility] oncology  Moderate vascular dementia ([facility])  Continue supportive care  Patient's son is primary caregiver    VTE Pharmacologic Prophylaxis:   Moderate Risk (Score 3-4) - Pharmacological DVT Prophylaxis Ordered:  enoxaparin (Lovenox).    Mobility:   Basic Mobility Inpatient Raw Score: 15  JH-HLM Goal: 4: Move to chair/commode  JH-HLM Achieved: 5: Stand (1 or more minutes)  JH-HLM Goal achieved. Continue to encourage appropriate mobility.    Patient Centered Rounds: I performed bedside rounds with nursing staff today.   Discussions with Specialists or Other Care Team Provider: rn    Education and Discussions with Family / Patient: Patient declined call to .     Current Length of Stay: 1 day(s)  Current Patient Status: Inpatient   Certification Statement: The patient will continue to require additional inpatient hospital stay due to angiogram, possible surgical intervention  Discharge Plan: Anticipate discharge in >72 hrs to discharge location to be determined pending rehab evaluations.    Code Status: Level 1 - Full Code    Subjective   Patient resting comfortably in bed. Reports some discomfort in his toe but otherwise denies current complaints. Awaiting procedure tomorrow. Reports good appetite.     Objective :  Temp:  [98.2 °F (36.8 °C)-98.4 °F (36.9 °C)] 98.3 °F (36.8 °C)  HR:  [70-77] 77  BP: (108-151)/(54-77) 130/67  Resp:  [14-16] 16  SpO2:  [91 %-95 %] 91 %  O2 Device: None (Room air)    Body mass index is 34.46 kg/m².     Input and Output Summary (last 24 hours):     Intake/Output Summary (Last 24 hours) at 10/29/2024 1549  Last data filed at 10/29/2024 1301  Gross per 24 hour   Intake 1028 ml   Output 1720 ml   Net -692 ml       Physical Exam  Vitals and nursing note reviewed.   Constitutional:       General: He is not in acute distress.     Appearance: He is well-developed.   Cardiovascular:      Rate and Rhythm: Normal rate and regular rhythm.      Heart sounds: No murmur heard.  Pulmonary:      Effort: Pulmonary effort is normal. No respiratory distress.      Breath sounds: Normal breath sounds.   Abdominal:      Tenderness: There is no abdominal tenderness.   Musculoskeletal:         General: No  swelling.   Skin:     General: Skin is warm and dry.   Neurological:      Mental Status: He is alert.   Psychiatric:         Mood and Affect: Mood normal.       Lines/Drains:  Lines/Drains/Airways       Active Status       Name Placement date Placement time Site Days    Port A Cath 10/28/20 Right Chest 10/28/20  0900  Chest  1462                    Central Line:  Goal for removal: N/A - Chronic PICC               Lab Results: I have reviewed the following results:   Results from last 7 days   Lab Units 10/29/24  0501 10/27/24  0529 10/26/24  0453   WBC Thousand/uL 8.31 9.22 7.64   HEMOGLOBIN g/dL 10.1* 12.1 11.3*   HEMATOCRIT % 32.4* 38.1 35.2*   PLATELETS Thousands/uL 150 169 152   BANDS PCT %  --   --  1   SEGS PCT %  --  79*  --    LYMPHO PCT %  --  12* 12*   MONO PCT %  --  7 7   EOS PCT %  --  0 0     Results from last 7 days   Lab Units 10/29/24  0501 10/27/24  0529 10/26/24  0453   SODIUM mmol/L 138   < > 139   POTASSIUM mmol/L 4.0   < > 3.9   CHLORIDE mmol/L 111*   < > 108   CO2 mmol/L 22   < > 23   BUN mg/dL 20   < > 21   CREATININE mg/dL 0.70   < > 0.73   ANION GAP mmol/L 5   < > 8   CALCIUM mg/dL 7.9*   < > 8.5   ALBUMIN g/dL  --   --  3.6   TOTAL BILIRUBIN mg/dL  --   --  0.86   ALK PHOS U/L  --   --  63   ALT U/L  --   --  24   AST U/L  --   --  15   GLUCOSE RANDOM mg/dL 87   < > 86    < > = values in this interval not displayed.         Results from last 7 days   Lab Units 10/29/24  1112 10/29/24  0751 10/28/24  2046 10/28/24  1552 10/28/24  1109 10/28/24  0659 10/27/24  2102 10/27/24  1550 10/27/24  1126 10/27/24  0708 10/26/24  2037 10/26/24  1625   POC GLUCOSE mg/dl 106 87 190* 165* 102 86 139 139 108 92 122 127     Results from last 7 days   Lab Units 10/23/24  0543   HEMOGLOBIN A1C % 5.5           Recent Cultures (last 7 days):   Results from last 7 days   Lab Units 10/23/24  0931   GRAM STAIN RESULT  No Polys or Bacteria seen   WOUND CULTURE  2+ Growth of Pseudomonas aeruginosa*  1+ Growth of        Imaging Results Review: I reviewed radiology reports from this admission including: xray(s) and Ultrasound(s).  Other Study Results Review: No additional pertinent studies reviewed.    Last 24 Hours Medication List:     Current Facility-Administered Medications:     acetaminophen (TYLENOL) tablet 650 mg, Q6H PRN    aspirin chewable tablet 81 mg, Daily    atorvastatin (LIPITOR) tablet 80 mg, Daily With Dinner    bismuth subsalicylate (PEPTO BISMOL) oral suspension 524 mg, Q6H PRN    cefepime (MAXIPIME) IVPB (premix in dextrose) 2,000 mg 50 mL, Q12H, Last Rate: 2,000 mg (10/29/24 1439)    clopidogrel (PLAVIX) tablet 75 mg, Daily    enoxaparin (LOVENOX) subcutaneous injection 40 mg, Q24H DEWAYNE    ezetimibe (ZETIA) tablet 10 mg, Daily    furosemide (LASIX) tablet 20 mg, Daily    gabapentin (NEURONTIN) capsule 600 mg, BID    insulin lispro (HumALOG/ADMELOG) 100 units/mL subcutaneous injection 1-5 Units, TID AC **AND** Fingerstick Glucose (POCT), TID AC    losartan (COZAAR) tablet 12.5 mg, Daily    methocarbamol (ROBAXIN) tablet 500 mg, Q8H PRN    metoprolol succinate (TOPROL-XL) 24 hr tablet 25 mg, Daily    oxyCODONE (ROXICODONE) IR tablet 5 mg, Q4H PRN    potassium chloride (Klor-Con M20) CR tablet 20 mEq, Daily    predniSONE tablet 5 mg, BID With Meals    vancomycin (VANCOCIN) IVPB (premix in dextrose) 1,000 mg 200 mL, Q12H, Last Rate: 1,000 mg (10/29/24 0904)    Administrative Statements   Today, Patient Was Seen By: Brigitte Cobb PA-C    **Please Note: This note may have been constructed using a voice recognition system.**

## 2024-10-29 NOTE — PROGRESS NOTES
Royce Rene is a 78 y.o. male who is currently ordered Vancomycin IV with management by the Pharmacy Consult service.  Relevant clinical data and objective / subjective history reviewed.  Vancomycin Assessment:  Indication and Goal AUC/Trough: Soft tissue (goal -600, trough >10), -600, trough >10  Clinical Status: stable  Micro:     Renal Function:  SCr: 0.70 mg/dL  CrCl: 97.9 mL/min  Renal replacement: Not on dialysis  Days of Therapy: 8  Current Dose: 1000mg IV q12h  Vancomycin Plan:  New Dosinmg IV q12h  Estimated AUC: 470 mcg*hr/mL  Estimated Trough: 13.9 mcg/mL  Next Level: 10/30/24 at 0600  Renal Function Monitoring: Daily BMP and UOP  Pharmacy will continue to follow closely for s/sx of nephrotoxicity, infusion reactions and appropriateness of therapy.  BMP and CBC will be ordered per protocol. We will continue to follow the patient’s culture results and clinical progress daily.    See [place], Pharmacist

## 2024-10-29 NOTE — PLAN OF CARE
Problem: PAIN - ADULT  Goal: Verbalizes/displays adequate comfort level or baseline comfort level  Description: Interventions:  - Encourage patient to monitor pain and request assistance  - Assess pain using appropriate pain scale  - Administer analgesics based on type and severity of pain and evaluate response  - Implement non-pharmacological measures as appropriate and evaluate response  - Consider cultural and social influences on pain and pain management  - Notify physician/advanced practitioner if interventions unsuccessful or patient reports new pain  Outcome: Progressing     Problem: INFECTION - ADULT  Goal: Absence or prevention of progression during hospitalization  Description: INTERVENTIONS:  - Assess and monitor for signs and symptoms of infection  - Monitor lab/diagnostic results  - Monitor all insertion sites, i.e. indwelling lines, tubes, and drains  - Monitor endotracheal if appropriate and nasal secretions for changes in amount and color  - [place] appropriate cooling/warming therapies per order  - Administer medications as ordered  - Instruct and encourage patient and family to use good hand hygiene technique  - Identify and instruct in appropriate isolation precautions for identified infection/condition  Outcome: Progressing     Problem: SAFETY ADULT  Goal: Patient will remain free of falls  Description: INTERVENTIONS:  - Educate patient/family on patient safety including physical limitations  - Instruct patient to call for assistance with activity   - Consult OT/PT to assist with strengthening/mobility   - Keep Call bell within reach  - Keep bed low and locked with side rails adjusted as appropriate  - Keep care items and personal belongings within reach  - Initiate and maintain comfort rounds  - Make Fall Risk Sign visible to staff  - Offer Toileting every 2 Hours, in advance of need  - Initiate/Maintain bed alarm  - Obtain necessary fall risk management equipment: yes  - Apply yellow socks  and bracelet for high fall risk patients  - Consider moving patient to room near nurses station  Outcome: Progressing  Goal: Maintain or return to baseline ADL function  Description: INTERVENTIONS:  -  Assess patient's ability to carry out ADLs; assess patient's baseline for ADL function and identify physical deficits which impact ability to perform ADLs (bathing, care of mouth/teeth, toileting, grooming, dressing, etc.)  - Assess/evaluate cause of self-care deficits   - Assess range of motion  - Assess patient's mobility; develop plan if impaired  - Assess patient's need for assistive devices and provide as appropriate  - Encourage maximum independence but intervene and supervise when necessary  - Involve family in performance of ADLs  - Assess for home care needs following discharge   - Consider OT consult to assist with ADL evaluation and planning for discharge  - Provide patient education as appropriate  Outcome: Progressing  Goal: Maintains/Returns to pre admission functional level  Description: INTERVENTIONS:  - Perform AM-PAC 6 Click Basic Mobility/ Daily Activity assessment daily.  - Set and communicate daily mobility goal to care team and patient/family/caregiver.   - Collaborate with rehabilitation services on mobility goals if consulted  - Perform Range of Motion 3 times a day.  - Reposition patient every 2 hours.  - Dangle patient 3 times a day  - Stand patient 3 times a day  - Ambulate patient 3 times a day  - Out of bed to chair 3 times a day   - Out of bed for meals 3 times a day  - Out of bed for toileting  - Record patient progress and toleration of activity level   Outcome: Progressing     Problem: DISCHARGE PLANNING  Goal: Discharge to home or other facility with appropriate resources  Description: INTERVENTIONS:  - Identify barriers to discharge w/patient and caregiver  - Arrange for needed discharge resources and transportation as appropriate  - Identify discharge learning needs (meds, wound  care, etc.)  - Arrange for interpretive services to assist at discharge as needed  - Refer to Case Management Department for coordinating discharge planning if the patient needs post-hospital services based on physician/advanced practitioner order or complex needs related to functional status, cognitive ability, or social support system  Outcome: Progressing     Problem: Knowledge Deficit  Goal: Patient/family/caregiver demonstrates understanding of disease process, treatment plan, medications, and discharge instructions  Description: Complete learning assessment and assess knowledge base.  Interventions:  - Provide teaching at level of understanding  - Provide teaching via preferred learning methods  Outcome: Progressing     Problem: NEUROSENSORY - ADULT  Goal: Achieves stable or improved neurological status  Description: INTERVENTIONS  - Monitor and report changes in neurological status  - Monitor vital signs such as temperature, blood pressure, glucose, and any other labs ordered   - Initiate measures to prevent increased intracranial pressure  - Monitor for seizure activity and implement precautions if appropriate      Outcome: Progressing  Goal: Achieves maximal functionality and self care  Description: INTERVENTIONS  - Monitor swallowing and airway patency with patient fatigue and changes in neurological status  - Encourage and assist patient to increase activity and self care.   - Encourage visually impaired, hearing impaired and aphasic patients to use assistive/communication devices  Outcome: Progressing     Problem: METABOLIC, FLUID AND ELECTROLYTES - ADULT  Goal: Electrolytes maintained within normal limits  Description: INTERVENTIONS:  - Monitor labs and assess patient for signs and symptoms of electrolyte imbalances  - Administer electrolyte replacement as ordered  - Monitor response to electrolyte replacements, including repeat lab results as appropriate  - Instruct patient on fluid and nutrition as  appropriate  Outcome: Progressing  Goal: Fluid balance maintained  Description: INTERVENTIONS:  - Monitor labs   - Monitor I/O and WT  - Instruct patient on fluid and nutrition as appropriate  - Assess for signs & symptoms of volume excess or deficit  Outcome: Progressing  Goal: Glucose maintained within target range  Description: INTERVENTIONS:  - Monitor Blood Glucose as ordered  - Assess for signs and symptoms of hyperglycemia and hypoglycemia  - Administer ordered medications to maintain glucose within target range  - Assess nutritional intake and initiate nutrition service referral as needed  Outcome: Progressing     Problem: MUSCULOSKELETAL - ADULT  Goal: Maintain or return mobility to safest level of function  Description: INTERVENTIONS:  - Assess patient's ability to carry out ADLs; assess patient's baseline for ADL function and identify physical deficits which impact ability to perform ADLs (bathing, care of mouth/teeth, toileting, grooming, dressing, etc.)  - Assess/evaluate cause of self-care deficits   - Assess range of motion  - Assess patient's mobility  - Assess patient's need for assistive devices and provide as appropriate  - Encourage maximum independence but intervene and supervise when necessary  - Involve family in performance of ADLs  - Assess for home care needs following discharge   - Consider OT consult to assist with ADL evaluation and planning for discharge  - Provide patient education as appropriate  Outcome: Progressing  Goal: Maintain proper alignment of affected body part  Description: INTERVENTIONS:  - Support, maintain and protect limb and body alignment  - Provide patient/ family with appropriate education  Outcome: Progressing

## 2024-10-29 NOTE — PHYSICAL THERAPY NOTE
PHYSICAL THERAPY EVALUATION/TREATMENT     10/29/24 1450   PT Last Visit   PT Visit Date 10/29/24   Note Type   Note type Evaluation   Pain Assessment   Pain Assessment Tool Ozuna-Baker FACES   Ozuna-Baker FACES Pain Rating 10  (Right foot, toe area with weightbearing)   Restrictions/Precautions   RLE Weight Bearing Per Order WBAT   Braces or Orthoses Other (Comment)  (Surgical shoe)   Other Precautions Chair Alarm;Bed Alarm;Fall Risk;Pain;WBS   Home Living   Type of Home House   Home Layout Two level  (No stairs to enter, stair glide to the second floor)   Bathroom Equipment Shower chair   Home Equipment Walker;Cane;Electric scooter  (Rollator)   Additional Comments Patient states utilizing an electric scooter in the home as well as an office chair in the kitchen for cooking and dishes   Prior Function   Lives With Son   Receives Help From Family   IADLs Family/Friend/Other provides transportation;Family/Friend/Other provides meals;Family/Friend/Other provides medication management   Falls in the last 6 months 1 to 4   Comments Patient states receiving Meals on Wheels, family assists as needed with household tasks.  Patient also states cooking and doing dishes at times using an office chair in the kitchen due to inability to stand for any length of time with right foot pain   General   Additional Pertinent History Chart reviewed, patient admitted with osteomyelitis.  Patient ordered for weightbearing as tolerated in surgical shoe to the right foot.  Now awaiting podiatry decisions with testing.  Patient is ambulatory to the bathroom with minimal assist although with limited tolerance for weightbearing due to right foot pain   Family/Caregiver Present No   Cognition   Overall Cognitive Status WFL   Arousal/Participation Cooperative   Attention Within functional limits   Orientation Level Oriented X4   Following Commands Follows multistep commands with increased time or repetition   Subjective   Subjective  Patient states limiting ambulation over the last year or so due to right foot pain   RLE Assessment   RLE Assessment   (Range of motion within functional limits, strength 3+/5)   LLE Assessment   LLE Assessment   (Range of motion within functional limits, strength 3+/5)   Coordination   Movements are Fluid and Coordinated 0   Coordination and Movement Description Decreased coordination with transfers and gait activity   Bed Mobility   Additional Comments Unable to assess out of bed at this time as patient had been up walking earlier deferred to weightbearing and gait at this time   Ambulation/Elevation   Gait Assistance 4  Minimal assist   Additional items Assist x 1;Verbal cues;Tactile cues   Assistive Device Rolling walker   Distance Patient observed earlier in the day ambulating with nursing staff 20 feet to and from the bathroom   Activity Tolerance   Activity Tolerance Patient limited by fatigue;Patient limited by pain   Nurse Made Aware Yes   Assessment   Prognosis Good   Problem List Decreased strength;Decreased range of motion;Decreased endurance;Impaired balance;Decreased mobility;Decreased coordination;Decreased skin integrity;Pain   Assessment Patient seen for Physical Therapy evaluation. Patient admitted with Osteomyelitis (HCC).  Comorbidities affecting patient's physical performance include: osteomyelitis.  Personal factors affecting patient at time of initial evaluation include: lives in two story house, inability to ambulate household distances, inability to navigate community distances, inability to navigate level surfaces without external assistance, inability to perform dynamic tasks in community, limited home support, positive fall history, inability to perform physical activity, inability to perform ADLS, and inability to perform IADLS . Prior to admission, patient was independent with functional mobility with electric scooter or office chair in his home, independent with ADLS, requiring assist  for IADLS, living in a multi-level home, home with family assist, and is wheelchair bound.  Please find objective findings from Physical Therapy assessment regarding body systems outlined above with impairments and limitations including weakness, decreased ROM, impaired balance, decreased endurance, impaired coordination, gait deviations, pain, decreased activity tolerance, decreased functional mobility tolerance, fall risk, and decreased skin integrity.  The Barthel Index was used as a functional outcome tool presenting with a score of Barthel Index Score: 55 today indicating marked limitations of functional mobility and ADLS.  Patient's clinical presentation is currently unstable/unpredictable as seen in patient's presentation of vital sign response, changing level of pain, increased fall risk, new onset of impairment of functional mobility, decreased endurance, and new onset of weakness. Pt would benefit from continued Physical Therapy treatment to address deficits as defined above and maximize level of functional mobility. As demonstrated by objective findings, the assigned level of complexity for this evaluation is high.The patient's -[facility] Basic Mobility Inpatient Short Form Raw Score is 17. A Raw score of greater than 16 suggests the patient may benefit from discharge to home. Please also refer to the recommendation of the Physical Therapist for safe discharge planning.   Goals   Patient Goals to heal foot and have less pain   STG Expiration Date 11/05/24   Short Term Goal #1 Transfers and gait with roller walker with supervision   Short Term Goal #2 gait endurance to 50 feet   LTG Expiration Date 11/12/24   Long Term Goal #1 Strength bilateral lower extremities 4 -/5   Long Term Goal #2 Independent transfers and gait with roller walker for functional household distances   Plan   Treatment/Interventions ADL retraining;Functional transfer training;LE strengthening/ROM;Therapeutic exercise;Endurance  "training;Patient/family training;Equipment eval/education;Bed mobility;Gait training;Compensatory technique education   PT Frequency 3-5x/wk   Discharge Recommendation   Rehab Resource Intensity Level, PT III (Minimum Resource Intensity)   Additional Comments pending podiatric procedures/decisions   AM-PAC Basic Mobility Inpatient   Turning in Flat Bed Without Bedrails 3   Lying on Back to Sitting on Edge of Flat Bed Without Bedrails 3   Moving Bed to Chair 3   Standing Up From Chair Using Arms 3   Walk in Room 3   Climb 3-5 Stairs With Railing 2   Basic Mobility Inpatient Raw Score 17   Basic Mobility Standardized Score 39.67   [facility] Highest Level Of Mobility   -[facility] Goal 5: Stand one or more mins   -[facility] Achieved 2: Bed activities/Dependent transfer  (patient deferred OOB at this time, \"i walked earlier and I don't like to cause pain in my foot with too much walking\")   Barthel Index   Feeding 10   Bathing 0   Grooming Score 0   Dressing Score 5   Bladder Score 10   Bowels Score 10   Toilet Use Score 5   Transfers (Bed/Chair) Score 10   Mobility (Level Surface) Score 0   Stairs Score 5   Barthel Index Score 55   Additional Treatment Session   Start Time 1435   End Time 1450   Treatment Assessment s: Patient reports 10 out of 10 pain in right foot with weightbearing at times O: Bilateral lower extremity exercise completed as listed below A: Patient will benefit from continued physical therapy with progression as tolerated.  Attention to be paid to podiatry decisions, possible procedures and alternate weightbearing status is during this medical stay   Exercises   Hip Flexion Sitting;10 reps;Bilateral  (Alternating)   Hip Abduction Sitting;10 reps;Bilateral  (Alternating)   Ankle Pumps Supine;10 reps;Bilateral   Licensure   [place] License Number  Courtney Anthony PT [id number]     "

## 2024-10-29 NOTE — UTILIZATION REVIEW
NOTIFICATION OF ADMISSION DISCHARGE   This is a Notification of Discharge from [facility]. Please be advised that this patient has been discharge from our facility. Below you will find the admission and discharge date and time including the patient’s disposition.   UTILIZATION REVIEW CONTACT:  Nandini Johnston  Utilization   Network Utilization Review Department  Phone: [phone] x carefully listen to the prompts. All voicemails are confidential.  Email: [email or web address]     ADMISSION INFORMATION  PRESENTATION DATE: 10/22/2024  2:29 PM  OBERVATION ADMISSION DATE: N/A  INPATIENT ADMISSION DATE: 10/22/24  4:40 PM   DISCHARGE DATE: 10/28/2024 11:48 AM   DISPOSITION:[facility] Utilization Review Department  ATTENTION: Please call with any questions or concerns to [phone] and carefully listen to the prompts so that you are directed to the right person. All voicemails are confidential.   For Discharge needs, contact Care Management DC Support Team at [phone] opt. [phone]  Send all requests for admission clinical reviews, approved or denied determinations and any other requests to dedicated fax number below belonging to the campus where the patient is receiving treatment. List of dedicated fax numbers for the Facilities:  FACILITY NAME UR FAX NUMBER   ADMISSION DENIALS (Administrative/Medical Necessity) [phone]   DISCHARGE SUPPORT TEAM ([facility]) [phone]   PARENT CHILD HEALTH (Maternity/NICU/Pediatrics) [phone]   [facility] [phone]   [facility] [phone]   [facility] [phone]   [facility] [phone]   [facility] [phone]   [facility] [phone]   [facility] [phone]   [facility]  [facility] [phone]   [facility] [phone]   [facility] [phone]   [facility] [phone]   [facility] [phone]

## 2024-10-29 NOTE — CONSULTS
e-Consult (IPC)  - Interventional Radiology  Royce Rene 78 y.o. male MRN: [id number]  Unit/Bed#: [address] [place] [address] Encounter: [id number]          Interventional Radiology has been consulted to evaluate Royce Rene    Inpatient Consult to IR  Consult performed by: Kavin Rueda MD  Consult ordered by: Vini Cano DO        10/29/24    Assessment/Recommendation:   78 year old male with history of CAD, HLD, metastatic prostate cancer, PAD s/p right femoral endarterectomy in 2021, right SFA stenting on 4/18/2023, presents with chronic right great toe wound with osteomyelitis.  Duplex US showed high grade stenosis vs occlusion of the right SFA.    - Plan for right lower extremity angiogram with possible intervention.  - NPO after midnight.      5-10 minutes, >50% of the total time devoted to medical consultative verbal/EMR discussion between providers. Written report will be generated in the EMR.     Thank you for allowing Interventional Radiology to participate in the care of Rocye Rene. Please don't hesitate to call or TigerText us with any questions.     Kavin Rueda MD

## 2024-10-29 NOTE — CONSULTS
Consult - Podiatry   Royce Rene 78 y.o. male MRN: [id number]  Unit/Bed#: [address] [place] [address] Encounter: [id number]    Assessment & Plan     Assessment:  Peripheral artery disease bilateral lower extremity with secondary eschar distal aspect right hallux.  Probable subacute osteomyelitis distal phalanx right hallux.  Superficial wound lateral aspect left foot.  Rest pain.      Plan:  Chart reviewed.  X-ray reviewed.  Patient seen at bedside.  At this time patient is in need of IR/arteriogram in order to help perfusion to foot bilateral.  Patient is scheduled for intervention.  Pending results of repeat x-ray and arteriogram patient may be candidate for digital amputation, right hallux.    At this time we will continue wound care bilateral.  Continue pain control.    Repeat x-rays ordered to rule out osseous change.  If there is negligible change on x-ray will consider MRI to rule out osteomyelitis.    Long-term options for pedal concerns consist of possibly digital amputation if significant perfusion to right foot big toe occurs with arteriogram.  Patient will need repeat arteriogram after procedure.    If negligible change to right lower extremity status post arteriogram occurs, further treatment planning may be indicated including higher level amputation or active surveillance position pending the results of workup for osteomyelitis.  Podiatry will follow.    History of Present Illness     HPI:  Royce Rene is a 78 y.o. male who presents with pain of right foot.  Patient has longstanding history of ingrown toenail that according to him went bad and over time he is developed increasing pain and wound of his right foot..    Inpatient consult to Podiatry  Consult performed by: Zachery Montes DPM  Consult ordered by: Brigitte Cobb PA-C        Review of Systems   Constitutional: Negative.    HENT: Negative.    Eyes: Negative.    Respiratory: Negative.    Cardiovascular: Negative.    Gastrointestinal: Negative.     Musculoskeletal: Patient demonstrates pes cavus bilateral.  Skin: Patient is thin turgor of skin noted bilateral with dystrophy of nails and positive abnormal cooling.  Neurological: Negative.   Psych: negative.       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    Coronary artery disease     S/p stenting to circumflex and RCA    HFrEF (heart failure with reduced ejection fraction) ([facility]) 2021    High cholesterol     Prostate cancer ([facility])      Past Surgical History:   Procedure Laterality Date    CARDIAC ELECTROPHYSIOLOGY PROCEDURE N/A 2021    Procedure: Implant ICD - bi ventricular;  Surgeon: Jonas Oviedo MD;  Location: [facility] CARDIAC CATH LAB;  Service: Cardiology    CARDIAC SURGERY      IR LOWER EXTREMITY ANGIOGRAM  2023    ID TEAEC W/WO PATCH GRAFT COMMON FEMORAL Right 2021    Procedure: ENDARTERECTOMY ARTERIAL FEMORAL;  Surgeon: Serina Cook DO;  Location: [facility];  Service: Vascular     Social History   Social History     Substance and Sexual Activity   Alcohol Use Not Currently     Social History     Substance and Sexual Activity   Drug Use Not Currently     Social History     Tobacco Use   Smoking Status Former    Current packs/day: 0.00    Types: Cigarettes    Start date: 1962    Quit date: 2002    Years since quittin.3    Passive exposure: Past   Smokeless Tobacco Never     Family History:   Family History   Problem Relation Age of Onset    Cancer Mother        Meds/Allergies     Medications Prior to Admission:     acetaminophen (TYLENOL) 500 mg tablet    Accu-Chek FastClix Lancets MISC    Alcohol Swabs (Alcohol Pads) 70 % PADS    aspirin 81 mg chewable tablet    atorvastatin (LIPITOR) 80 mg tablet    Blood Glucose Monitoring Suppl (Accu-Chek Guide Me) w/Device KIT    cetirizine (ZyrTEC) 5 MG tablet    clopidogrel (PLAVIX) 75 mg tablet    Empagliflozin (Jardiance) 10 MG TABS tablet    ezetimibe (ZETIA) 10  "mg tablet    famotidine (PEPCID) 20 mg tablet    ferrous sulfate 325 (65 Fe) mg tablet    furosemide (LASIX) 20 mg tablet    gabapentin (NEURONTIN) 300 mg capsule    glucose blood (Accu-Chek Guide) test strip    Lancet Devices (Lancing Device) MISC    loperamide (IMODIUM) 2 mg capsule    losartan (COZAAR) 25 mg tablet    methocarbamol (ROBAXIN) 500 mg tablet    metoprolol succinate (TOPROL-XL) 25 mg 24 hr tablet    ondansetron (ZOFRAN) 4 mg tablet    polyethylene glycol (GLYCOLAX) 17 GM/SCOOP powder    potassium chloride (Klor-Con M20) 20 mEq tablet    predniSONE 5 mg tablet    senna (SENOKOT) 8.6 MG tablet  No Known Allergies    Objective   First Vitals:   Blood Pressure: 116/69 (10/28/24 1213)  Pulse: 77 (10/28/24 1213)  Temperature: 98.8 °F (37.1 °C) (10/28/24 1213)  Temp Source: Oral (10/28/24 1213)  Respirations: 20 (10/28/24 1213)  Height: 5' 7\" (170.2 cm) (10/28/24 1213)  Weight - Scale: 101 kg (222 lb 3.6 oz) (10/28/24 1213)  SpO2: 95 % (10/28/24 1213)    Current Vitals:   Blood Pressure: 130/67 (10/29/24 1509)  Pulse: 77 (10/29/24 1509)  Temperature: 98.3 °F (36.8 °C) (10/29/24 1103)  Temp Source: Oral (10/29/24 1103)  Respirations: 16 (10/29/24 1103)  Height: 5' 7\" (170.2 cm) (10/28/24 1213)  Weight - Scale: 99.8 kg (220 lb) (10/29/24 0542)  SpO2: 91 % (10/29/24 1509)        /67   Pulse 77   Temp 98.3 °F (36.8 °C) (Oral)   Resp 16   Ht 5' 7\" (1.702 m)   Wt 99.8 kg (220 lb)   SpO2 91%   BMI 34.46 kg/m²      General Appearance:    Alert, cooperative, no distress   Head:    Normocephalic, without obvious abnormality, atraumatic   Eyes:    PERRL, conjunctiva/corneas clear, EOM's intact        Nose:   Moist mucous membranes   Neck:   Supple, symmetrical, trachea midline   Back:     Symmetric   Lungs:     Respirations unlabored   Heart:    Regular rate and rhythm, S1 and S2 normal, no murmur, rub   or gallop   Abdomen:     Soft, non-tender   Extremities: Patient has nonpalpable pulses.  Patient has " "abnormal cooling of the foot bilateral.  There is cyanosis of the foot bilateral.  Arterial Doppler demonstrates obliteration of waveforms at both the toe and metatarsal level.  There is evidence of high-grade stenosis bilateral.   Pulses: Nonpalpable with abnormal cooling of foot.  Negative digital hair.   Skin: All nails are dystrophic.   Neurologic:   Gross sensation is intact. Protective sensation is intact.           Lab Results:   Admission on 10/28/2024   Component Date Value    POC Glucose 10/28/2024 165 (H)     POC Glucose 10/28/2024 190 (H)     Sodium 10/29/2024 138     Potassium 10/29/2024 4.0     Chloride 10/29/2024 111 (H)     CO2 10/29/2024 22     ANION GAP 10/29/2024 5     BUN 10/29/2024 20     Creatinine 10/29/2024 0.70     Glucose 10/29/2024 87     Calcium 10/29/2024 7.9 (L)     eGFR 10/29/2024 90     WBC 10/29/2024 8.31     RBC 10/29/2024 3.31 (L)     Hemoglobin 10/29/2024 10.1 (L)     Hematocrit 10/29/2024 32.4 (L)     MCV 10/29/2024 98     MCH 10/29/2024 30.5     MCHC 10/29/2024 31.2 (L)     RDW 10/29/2024 14.9     Platelets 10/29/2024 150     MPV 10/29/2024 9.6     POC Glucose 10/29/2024 87     POC Glucose 10/29/2024 106     POC Glucose 10/29/2024 140        Results from last 7 days   Lab Units 10/23/24  0931   GRAM STAIN RESULT  No Polys or Bacteria seen             Invalid input(s): \"LABAEARO\"            Imaging: I have personally reviewed pertinent films in PACS  EKG, Pathology, and Other Studies: I have personally reviewed pertinent reports.      Code Status: Level 1 - Full Code  Advance Directive and Living Will:      Power of :    POLST:           Counseling and Coordination of care  I spent 35 minutes face-to-face with patient today. More than 50% was spent in counseling & coordination of care. Counseled patient regarding nature of peripheral artery disease and gangrene and secondary bone infection..          "

## 2024-10-30 ENCOUNTER — APPOINTMENT (INPATIENT)
Dept: RADIOLOGY | Facility: HOSPITAL | Age: 79
DRG: 271 | End: 2024-10-30
Payer: COMMERCIAL

## 2024-10-30 ENCOUNTER — ANESTHESIA (INPATIENT)
Dept: NON INVASIVE DIAGNOSTICS | Facility: HOSPITAL | Age: 79
End: 2024-10-30
Payer: COMMERCIAL

## 2024-10-30 ENCOUNTER — ANESTHESIA EVENT (INPATIENT)
Dept: NON INVASIVE DIAGNOSTICS | Facility: HOSPITAL | Age: 79
End: 2024-10-30
Payer: COMMERCIAL

## 2024-10-30 ENCOUNTER — HOSPITAL ENCOUNTER (OUTPATIENT)
Dept: INFUSION CENTER | Facility: CLINIC | Age: 79
Discharge: HOME | End: 2024-10-30

## 2024-10-30 ENCOUNTER — APPOINTMENT (OUTPATIENT)
Dept: NON INVASIVE DIAGNOSTICS | Facility: HOSPITAL | Age: 79
DRG: 271 | End: 2024-10-30
Attending: STUDENT IN AN ORGANIZED HEALTH CARE EDUCATION/TRAINING PROGRAM
Payer: COMMERCIAL

## 2024-10-30 LAB
ANION GAP SERPL CALCULATED.3IONS-SCNC: 4 MMOL/L (ref 4–13)
BUN SERPL-MCNC: 20 MG/DL (ref 5–25)
CALCIUM SERPL-MCNC: 8.2 MG/DL (ref 8.4–10.2)
CHLORIDE SERPL-SCNC: 111 MMOL/L (ref 96–108)
CO2 SERPL-SCNC: 24 MMOL/L (ref 21–32)
CREAT SERPL-MCNC: 0.74 MG/DL (ref 0.6–1.3)
ERYTHROCYTE [DISTWIDTH] IN BLOOD BY AUTOMATED COUNT: 14.9 % (ref 11.6–15.1)
GFR SERPL CREATININE-BSD FRML MDRD: 88 ML/MIN/1.73SQ M
GLUCOSE SERPL-MCNC: 101 MG/DL (ref 65–140)
GLUCOSE SERPL-MCNC: 115 MG/DL (ref 65–140)
GLUCOSE SERPL-MCNC: 147 MG/DL (ref 65–140)
GLUCOSE SERPL-MCNC: 80 MG/DL (ref 65–140)
GLUCOSE SERPL-MCNC: 92 MG/DL (ref 65–140)
HCT VFR BLD AUTO: 33.3 % (ref 36.5–49.3)
HGB BLD-MCNC: 10.6 G/DL (ref 12–17)
INR PPP: 0.98 (ref 0.85–1.19)
MCH RBC QN AUTO: 30.9 PG (ref 26.8–34.3)
MCHC RBC AUTO-ENTMCNC: 31.8 G/DL (ref 31.4–37.4)
MCV RBC AUTO: 97 FL (ref 82–98)
PLATELET # BLD AUTO: 166 THOUSANDS/UL (ref 149–390)
PMV BLD AUTO: 9.6 FL (ref 8.9–12.7)
POTASSIUM SERPL-SCNC: 4 MMOL/L (ref 3.5–5.3)
PROTHROMBIN TIME: 13.5 SECONDS (ref 12.3–15)
RBC # BLD AUTO: 3.43 MILLION/UL (ref 3.88–5.62)
SODIUM SERPL-SCNC: 139 MMOL/L (ref 135–147)
VANCOMYCIN SERPL-MCNC: 18.1 UG/ML (ref 10–20)
WBC # BLD AUTO: 9 THOUSAND/UL (ref 4.31–10.16)

## 2024-10-30 PROCEDURE — 85610 PROTHROMBIN TIME: CPT | Performed by: RADIOLOGY

## 2024-10-30 PROCEDURE — C1894 INTRO/SHEATH, NON-LASER: HCPCS

## 2024-10-30 PROCEDURE — C1725 CATH, TRANSLUMIN NON-LASER: HCPCS

## 2024-10-30 PROCEDURE — C1769 GUIDE WIRE: HCPCS

## 2024-10-30 PROCEDURE — 99232 SBSQ HOSP IP/OBS MODERATE 35: CPT | Performed by: PHYSICIAN ASSISTANT

## 2024-10-30 PROCEDURE — B410YZZ FLUOROSCOPY OF ABDOMINAL AORTA USING OTHER CONTRAST: ICD-10-PCS | Performed by: RADIOLOGY

## 2024-10-30 PROCEDURE — 04CK3ZZ EXTIRPATION OF MATTER FROM RIGHT FEMORAL ARTERY, PERCUTANEOUS APPROACH: ICD-10-PCS | Performed by: RADIOLOGY

## 2024-10-30 PROCEDURE — 82948 REAGENT STRIP/BLOOD GLUCOSE: CPT

## 2024-10-30 PROCEDURE — 93923 UPR/LXTR ART STDY 3+ LVLS: CPT

## 2024-10-30 PROCEDURE — C2623 CATH, TRANSLUMIN, DRUG-COAT: HCPCS

## 2024-10-30 PROCEDURE — C1760 CLOSURE DEV, VASC: HCPCS

## 2024-10-30 PROCEDURE — C1724 CATH, TRANS ATHEREC,ROTATION: HCPCS

## 2024-10-30 PROCEDURE — C1887 CATHETER, GUIDING: HCPCS

## 2024-10-30 PROCEDURE — 80202 ASSAY OF VANCOMYCIN: CPT | Performed by: INTERNAL MEDICINE

## 2024-10-30 PROCEDURE — 76937 US GUIDE VASCULAR ACCESS: CPT | Performed by: RADIOLOGY

## 2024-10-30 PROCEDURE — 047K352 DILATION OF RIGHT FEMORAL ARTERY WITH TWO DRUG-ELUTING INTRALUMINAL DEVICES, SUSTAINED RELEASE, PERCUTANEOUS APPROACH: ICD-10-PCS | Performed by: RADIOLOGY

## 2024-10-30 PROCEDURE — 75710 ARTERY X-RAYS ARM/LEG: CPT | Performed by: RADIOLOGY

## 2024-10-30 PROCEDURE — 99232 SBSQ HOSP IP/OBS MODERATE 35: CPT

## 2024-10-30 PROCEDURE — 37184 PRIM ART M-THRMBC 1ST VSL: CPT | Performed by: RADIOLOGY

## 2024-10-30 PROCEDURE — 75710 ARTERY X-RAYS ARM/LEG: CPT

## 2024-10-30 PROCEDURE — 37227 HB FEM/POPL REVASC STNT & ATHER: CPT

## 2024-10-30 PROCEDURE — B41FYZZ FLUOROSCOPY OF RIGHT LOWER EXTREMITY ARTERIES USING OTHER CONTRAST: ICD-10-PCS | Performed by: RADIOLOGY

## 2024-10-30 PROCEDURE — 76937 US GUIDE VASCULAR ACCESS: CPT

## 2024-10-30 PROCEDURE — C1874 STENT, COATED/COV W/DEL SYS: HCPCS

## 2024-10-30 PROCEDURE — 80048 BASIC METABOLIC PNL TOTAL CA: CPT | Performed by: INTERNAL MEDICINE

## 2024-10-30 PROCEDURE — 37226 PR REVSC OPN/PRQ FEM/POP W/STNT/ANGIOP SM VSL: CPT | Performed by: RADIOLOGY

## 2024-10-30 PROCEDURE — 75625 CONTRAST EXAM ABDOMINL AORTA: CPT

## 2024-10-30 PROCEDURE — 85027 COMPLETE CBC AUTOMATED: CPT | Performed by: INTERNAL MEDICINE

## 2024-10-30 PROCEDURE — 93923 UPR/LXTR ART STDY 3+ LVLS: CPT | Performed by: SURGERY

## 2024-10-30 PROCEDURE — 73630 X-RAY EXAM OF FOOT: CPT

## 2024-10-30 PROCEDURE — 75625 CONTRAST EXAM ABDOMINL AORTA: CPT | Performed by: RADIOLOGY

## 2024-10-30 RX ORDER — SODIUM CHLORIDE 9 MG/ML
INJECTION, SOLUTION INTRAVENOUS CONTINUOUS PRN
Status: DISCONTINUED | OUTPATIENT
Start: 2024-10-30 | End: 2024-10-30

## 2024-10-30 RX ORDER — LIDOCAINE HYDROCHLORIDE 10 MG/ML
INJECTION, SOLUTION EPIDURAL; INFILTRATION; INTRACAUDAL; PERINEURAL AS NEEDED
Status: DISCONTINUED | OUTPATIENT
Start: 2024-10-30 | End: 2024-10-30

## 2024-10-30 RX ORDER — SODIUM CHLORIDE 9 MG/ML
0.3 INJECTION, SOLUTION INTRAVENOUS CONTINUOUS
Status: ACTIVE | OUTPATIENT
Start: 2024-10-30 | End: 2024-10-31

## 2024-10-30 RX ORDER — LIDOCAINE WITH 8.4% SOD BICARB 0.9%(10ML)
SYRINGE (ML) INJECTION AS NEEDED
Status: COMPLETED | OUTPATIENT
Start: 2024-10-30 | End: 2024-10-30

## 2024-10-30 RX ORDER — MIDAZOLAM HYDROCHLORIDE 2 MG/2ML
INJECTION, SOLUTION INTRAMUSCULAR; INTRAVENOUS AS NEEDED
Status: DISCONTINUED | OUTPATIENT
Start: 2024-10-30 | End: 2024-10-30

## 2024-10-30 RX ORDER — ONDANSETRON 2 MG/ML
INJECTION INTRAMUSCULAR; INTRAVENOUS AS NEEDED
Status: DISCONTINUED | OUTPATIENT
Start: 2024-10-30 | End: 2024-10-30

## 2024-10-30 RX ORDER — FENTANYL CITRATE 50 UG/ML
INJECTION, SOLUTION INTRAMUSCULAR; INTRAVENOUS AS NEEDED
Status: DISCONTINUED | OUTPATIENT
Start: 2024-10-30 | End: 2024-10-30

## 2024-10-30 RX ORDER — MEPERIDINE HYDROCHLORIDE 25 MG/ML
12.5 INJECTION INTRAMUSCULAR; INTRAVENOUS; SUBCUTANEOUS
Status: DISCONTINUED | OUTPATIENT
Start: 2024-10-30 | End: 2024-11-03 | Stop reason: HOSPADM

## 2024-10-30 RX ORDER — IODIXANOL 320 MG/ML
200 INJECTION, SOLUTION INTRAVASCULAR
Status: COMPLETED | OUTPATIENT
Start: 2024-10-30 | End: 2024-10-30

## 2024-10-30 RX ORDER — HEPARIN SODIUM 1000 [USP'U]/ML
INJECTION, SOLUTION INTRAVENOUS; SUBCUTANEOUS AS NEEDED
Status: DISCONTINUED | OUTPATIENT
Start: 2024-10-30 | End: 2024-10-30

## 2024-10-30 RX ORDER — ONDANSETRON 2 MG/ML
4 INJECTION INTRAMUSCULAR; INTRAVENOUS ONCE AS NEEDED
Status: DISCONTINUED | OUTPATIENT
Start: 2024-10-30 | End: 2024-11-03 | Stop reason: HOSPADM

## 2024-10-30 RX ORDER — PROPOFOL 10 MG/ML
INJECTION, EMULSION INTRAVENOUS AS NEEDED
Status: DISCONTINUED | OUTPATIENT
Start: 2024-10-30 | End: 2024-10-30

## 2024-10-30 RX ADMIN — FUROSEMIDE 20 MG: 20 TABLET ORAL at 08:57

## 2024-10-30 RX ADMIN — PREDNISONE 5 MG: 5 TABLET ORAL at 17:39

## 2024-10-30 RX ADMIN — ATORVASTATIN CALCIUM 80 MG: 80 TABLET, FILM COATED ORAL at 17:39

## 2024-10-30 RX ADMIN — VANCOMYCIN HYDROCHLORIDE 1000 MG: 1 INJECTION, SOLUTION INTRAVENOUS at 08:59

## 2024-10-30 RX ADMIN — MIDAZOLAM 2 MG: 1 INJECTION INTRAMUSCULAR; INTRAVENOUS at 13:20

## 2024-10-30 RX ADMIN — ASPIRIN 81 MG CHEWABLE TABLET 81 MG: 81 TABLET CHEWABLE at 08:57

## 2024-10-30 RX ADMIN — HEPARIN SODIUM 2000 UNITS: 1000 INJECTION INTRAVENOUS; SUBCUTANEOUS at 14:58

## 2024-10-30 RX ADMIN — CEFEPIME HYDROCHLORIDE 2000 MG: 2 INJECTION, SOLUTION INTRAVENOUS at 02:39

## 2024-10-30 RX ADMIN — FENTANYL CITRATE 25 MCG: 50 INJECTION, SOLUTION INTRAMUSCULAR; INTRAVENOUS at 14:41

## 2024-10-30 RX ADMIN — NOREPINEPHRINE BITARTRATE 1 MCG/MIN: 1 INJECTION INTRAVENOUS at 13:44

## 2024-10-30 RX ADMIN — OXYCODONE HYDROCHLORIDE 5 MG: 5 TABLET ORAL at 07:43

## 2024-10-30 RX ADMIN — PREDNISONE 5 MG: 5 TABLET ORAL at 07:47

## 2024-10-30 RX ADMIN — CEFEPIME HYDROCHLORIDE 2000 MG: 2 INJECTION, SOLUTION INTRAVENOUS at 17:41

## 2024-10-30 RX ADMIN — PHENYLEPHRINE HYDROCHLORIDE 20 MCG/MIN: 10 INJECTION INTRAVENOUS at 13:27

## 2024-10-30 RX ADMIN — FENTANYL CITRATE 25 MCG: 50 INJECTION, SOLUTION INTRAMUSCULAR; INTRAVENOUS at 13:54

## 2024-10-30 RX ADMIN — SODIUM CHLORIDE: 0.9 INJECTION, SOLUTION INTRAVENOUS at 13:10

## 2024-10-30 RX ADMIN — VANCOMYCIN HYDROCHLORIDE 1000 MG: 1 INJECTION, SOLUTION INTRAVENOUS at 20:12

## 2024-10-30 RX ADMIN — FENTANYL CITRATE 25 MCG: 50 INJECTION, SOLUTION INTRAMUSCULAR; INTRAVENOUS at 14:20

## 2024-10-30 RX ADMIN — OXYCODONE HYDROCHLORIDE 5 MG: 5 TABLET ORAL at 18:20

## 2024-10-30 RX ADMIN — EZETIMIBE 10 MG: 10 TABLET ORAL at 08:57

## 2024-10-30 RX ADMIN — METOPROLOL SUCCINATE 25 MG: 25 TABLET, EXTENDED RELEASE ORAL at 08:57

## 2024-10-30 RX ADMIN — CLOPIDOGREL 75 MG: 75 TABLET ORAL at 08:57

## 2024-10-30 RX ADMIN — LOSARTAN POTASSIUM 12.5 MG: 25 TABLET, FILM COATED ORAL at 08:57

## 2024-10-30 RX ADMIN — SODIUM CHLORIDE 0.3 ML/KG/HR: 0.9 INJECTION, SOLUTION INTRAVENOUS at 18:03

## 2024-10-30 RX ADMIN — ONDANSETRON 4 MG: 2 INJECTION INTRAMUSCULAR; INTRAVENOUS at 13:41

## 2024-10-30 RX ADMIN — HEPARIN SODIUM 5000 UNITS: 1000 INJECTION INTRAVENOUS; SUBCUTANEOUS at 13:55

## 2024-10-30 RX ADMIN — PROPOFOL 150 MG: 10 INJECTION, EMULSION INTRAVENOUS at 13:22

## 2024-10-30 RX ADMIN — LIDOCAINE HYDROCHLORIDE 50 MG: 10 INJECTION, SOLUTION EPIDURAL; INFILTRATION; INTRACAUDAL; PERINEURAL at 13:22

## 2024-10-30 RX ADMIN — POTASSIUM CHLORIDE 20 MEQ: 1500 TABLET, EXTENDED RELEASE ORAL at 08:57

## 2024-10-30 RX ADMIN — GABAPENTIN 600 MG: 300 CAPSULE ORAL at 08:57

## 2024-10-30 RX ADMIN — GABAPENTIN 600 MG: 300 CAPSULE ORAL at 17:39

## 2024-10-30 RX ADMIN — ENOXAPARIN SODIUM 40 MG: 40 INJECTION SUBCUTANEOUS at 08:57

## 2024-10-30 RX ADMIN — IODIXANOL 200 ML: 320 INJECTION, SOLUTION INTRAVASCULAR at 15:30

## 2024-10-30 RX ADMIN — FENTANYL CITRATE 25 MCG: 50 INJECTION, SOLUTION INTRAMUSCULAR; INTRAVENOUS at 13:31

## 2024-10-30 RX ADMIN — NOREPINEPHRINE BITARTRATE 8 MCG: 1 INJECTION INTRAVENOUS at 13:31

## 2024-10-30 RX ADMIN — Medication 10 ML: at 13:42

## 2024-10-30 NOTE — ASSESSMENT & PLAN NOTE
Wt Readings from Last 3 Encounters:   10/30/24 99.8 kg (220 lb)   10/28/24 98.9 kg (218 lb 0.6 oz)   10/09/24 100 kg (221 lb 5.5 oz)     ECHO 8/2023: EF 30-35%, systolic function severely reduced, diastolic function abnormal consistent with grade I relaxation  Continue Lasix 20 mg daily, Toprol XL 25 mg daily  Appears euvolemic  Monitor Is/Os and daily weights

## 2024-10-30 NOTE — ASSESSMENT & PLAN NOTE
Patient originally presented to [facility] with worsening wound on right great toe since hitting it recently.  XR R foot suspicious for osteomyelitis of great toe  Wound culture grew pseudomonas  Currently on cefepime and vancomycin  Podiatry and vascular surgery consulted  Angiogram planned for today  Repeat XR R foot pending  Possible surgical intervention per podiatry

## 2024-10-30 NOTE — ASSESSMENT & PLAN NOTE
10/23/24 HEATHER duplex reveals high grade stenosis vs occlusion of the right SFA  IR has already been consulted and plans for lower extremity angiogram today

## 2024-10-30 NOTE — PLAN OF CARE
Problem: PAIN - ADULT  Goal: Verbalizes/displays adequate comfort level or baseline comfort level  Description: Interventions:  - Encourage patient to monitor pain and request assistance  - Assess pain using appropriate pain scale  - Administer analgesics based on type and severity of pain and evaluate response  - Implement non-pharmacological measures as appropriate and evaluate response  - Consider cultural and social influences on pain and pain management  - Notify physician/advanced practitioner if interventions unsuccessful or patient reports new pain  Outcome: Progressing     Problem: INFECTION - ADULT  Goal: Absence or prevention of progression during hospitalization  Description: INTERVENTIONS:  - Assess and monitor for signs and symptoms of infection  - Monitor lab/diagnostic results  - Monitor all insertion sites, i.e. indwelling lines, tubes, and drains  - Monitor endotracheal if appropriate and nasal secretions for changes in amount and color  - Milligan appropriate cooling/warming therapies per order  - Administer medications as ordered  - Instruct and encourage patient and family to use good hand hygiene technique  - Identify and instruct in appropriate isolation precautions for identified infection/condition  Outcome: Progressing     Problem: SAFETY ADULT  Goal: Patient will remain free of falls  Description: INTERVENTIONS:  - Educate patient/family on patient safety including physical limitations  - Instruct patient to call for assistance with activity   - Consult OT/PT to assist with strengthening/mobility   - Keep Call bell within reach  - Keep bed low and locked with side rails adjusted as appropriate  - Keep care items and personal belongings within reach  - Initiate and maintain comfort rounds  - Make Fall Risk Sign visible to staff  - Offer Toileting every 2 Hours, in advance of need  - Initiate/Maintain bed alarm  - Obtain necessary fall risk management equipment: yes  - Apply yellow socks  and bracelet for high fall risk patients  - Consider moving patient to room near nurses station  Outcome: Progressing  Goal: Maintain or return to baseline ADL function  Description: INTERVENTIONS:  -  Assess patient's ability to carry out ADLs; assess patient's baseline for ADL function and identify physical deficits which impact ability to perform ADLs (bathing, care of mouth/teeth, toileting, grooming, dressing, etc.)  - Assess/evaluate cause of self-care deficits   - Assess range of motion  - Assess patient's mobility; develop plan if impaired  - Assess patient's need for assistive devices and provide as appropriate  - Encourage maximum independence but intervene and supervise when necessary  - Involve family in performance of ADLs  - Assess for home care needs following discharge   - Consider OT consult to assist with ADL evaluation and planning for discharge  - Provide patient education as appropriate  Outcome: Progressing  Goal: Maintains/Returns to pre admission functional level  Description: INTERVENTIONS:  - Perform AM-PAC 6 Click Basic Mobility/ Daily Activity assessment daily.  - Set and communicate daily mobility goal to care team and patient/family/caregiver.   - Collaborate with rehabilitation services on mobility goals if consulted  - Perform Range of Motion 3 times a day.  - Reposition patient every 2 hours.  - Dangle patient 3 times a day  - Stand patient 3 times a day  - Ambulate patient 3 times a day  - Out of bed to chair 3 times a day   - Out of bed for meals 3 times a day  - Out of bed for toileting  - Record patient progress and toleration of activity level   Outcome: Progressing     Problem: DISCHARGE PLANNING  Goal: Discharge to home or other facility with appropriate resources  Description: INTERVENTIONS:  - Identify barriers to discharge w/patient and caregiver  - Arrange for needed discharge resources and transportation as appropriate  - Identify discharge learning needs (meds, wound  care, etc.)  - Arrange for interpretive services to assist at discharge as needed  - Refer to Case Management Department for coordinating discharge planning if the patient needs post-hospital services based on physician/advanced practitioner order or complex needs related to functional status, cognitive ability, or social support system  Outcome: Progressing     Problem: Knowledge Deficit  Goal: Patient/family/caregiver demonstrates understanding of disease process, treatment plan, medications, and discharge instructions  Description: Complete learning assessment and assess knowledge base.  Interventions:  - Provide teaching at level of understanding  - Provide teaching via preferred learning methods  Outcome: Progressing     Problem: NEUROSENSORY - ADULT  Goal: Achieves stable or improved neurological status  Description: INTERVENTIONS  - Monitor and report changes in neurological status  - Monitor vital signs such as temperature, blood pressure, glucose, and any other labs ordered   - Initiate measures to prevent increased intracranial pressure  - Monitor for seizure activity and implement precautions if appropriate      Outcome: Progressing  Goal: Achieves maximal functionality and self care  Description: INTERVENTIONS  - Monitor swallowing and airway patency with patient fatigue and changes in neurological status  - Encourage and assist patient to increase activity and self care.   - Encourage visually impaired, hearing impaired and aphasic patients to use assistive/communication devices  Outcome: Progressing     Problem: METABOLIC, FLUID AND ELECTROLYTES - ADULT  Goal: Electrolytes maintained within normal limits  Description: INTERVENTIONS:  - Monitor labs and assess patient for signs and symptoms of electrolyte imbalances  - Administer electrolyte replacement as ordered  - Monitor response to electrolyte replacements, including repeat lab results as appropriate  - Instruct patient on fluid and nutrition as  appropriate  Outcome: Progressing  Goal: Fluid balance maintained  Description: INTERVENTIONS:  - Monitor labs   - Monitor I/O and WT  - Instruct patient on fluid and nutrition as appropriate  - Assess for signs & symptoms of volume excess or deficit  Outcome: Progressing  Goal: Glucose maintained within target range  Description: INTERVENTIONS:  - Monitor Blood Glucose as ordered  - Assess for signs and symptoms of hyperglycemia and hypoglycemia  - Administer ordered medications to maintain glucose within target range  - Assess nutritional intake and initiate nutrition service referral as needed  Outcome: Progressing     Problem: SKIN/TISSUE INTEGRITY - ADULT  Goal: Skin Integrity remains intact(Skin Breakdown Prevention)  Description: Assess:  -Perform Harshal assessment every shift  -Clean and moisturize skin every shift   -Inspect skin when repositioning, toileting, and assisting with ADLS  -Assess under medical devices such as masimo every shift   -Assess extremities for adequate circulation and sensation     Bed Management:  -Have minimal linens on bed & keep smooth, unwrinkled  -Change linens as needed when moist or perspiring  -Avoid sitting or lying in one position for more than 2 hours while in bed  -Keep HOB at 30 degrees     Toileting:  -Offer bedside commode  -Assess for incontinence every shift   -Use incontinent care products after each incontinent episode such as remedy    Activity:  -Mobilize patient 3 times a day  -Encourage activity and walks on unit  -Encourage or provide ROM exercises   -Turn and reposition patient every 2 Hours  -Use appropriate equipment to lift or move patient in bed  -Instruct/ Assist with weight shifting every 30 minutes  when out of bed in chair  -Consider limitation of chair time 2 hour intervals    Skin Care:  -Avoid use of baby powder, tape, friction and shearing, hot water or constrictive clothing  -Relieve pressure over bony prominences using wedges and pillows   -Do  not massage red bony areas    Next Steps:  -Teach patient strategies to minimize risks such as weight shifting    -Consider consults to  interdisciplinary teams   Outcome: Progressing  Goal: Incision(s), wounds(s) or drain site(s) healing without S/S of infection  Description: INTERVENTIONS  - Assess and document dressing, incision, wound bed, drain sites and surrounding tissue  - Provide patient and family education  - Perform skin care/dressing changes every 2 hours  Outcome: Progressing  Goal: Pressure injury heals and does not worsen  Description: Interventions:  - Implement low air loss mattress or specialty surface (Criteria met)  - Apply silicone foam dressing  - Instruct/assist with weight shifting every 120 minutes when in chair   - Limit chair time to 2 hour intervals  - Use special pressure reducing interventions such as waffle cushions when in chair   - Apply fecal or urinary incontinence containment device   - Perform passive or active ROM every 2 hours  - Turn and reposition patient & offload bony prominences every 2 hours   - Utilize friction reducing device or surface for transfers   - Consider nutrition services referral as needed  Outcome: Progressing     Problem: MUSCULOSKELETAL - ADULT  Goal: Maintain or return mobility to safest level of function  Description: INTERVENTIONS:  - Assess patient's ability to carry out ADLs; assess patient's baseline for ADL function and identify physical deficits which impact ability to perform ADLs (bathing, care of mouth/teeth, toileting, grooming, dressing, etc.)  - Assess/evaluate cause of self-care deficits   - Assess range of motion  - Assess patient's mobility  - Assess patient's need for assistive devices and provide as appropriate  - Encourage maximum independence but intervene and supervise when necessary  - Involve family in performance of ADLs  - Assess for home care needs following discharge   - Consider OT consult to assist with ADL evaluation and  planning for discharge  - Provide patient education as appropriate  Outcome: Progressing  Goal: Maintain proper alignment of affected body part  Description: INTERVENTIONS:  - Support, maintain and protect limb and body alignment  - Provide patient/ family with appropriate education  Outcome: Progressing

## 2024-10-30 NOTE — SEDATION DOCUMENTATION
Patient procedure completed by Dr. Rueda, tolerated procedure well. Closure device and dry dressing in place. Patient transferred to PACU in stable condition.

## 2024-10-30 NOTE — ANESTHESIA POSTPROCEDURE EVALUATION
Post-Op Assessment Note            No anethesia notable event occurred.    Staff: Anesthesiologist           Last Filed PACU Vitals:  Vitals Value Taken Time   Temp 97.2 °F (36.2 °C) 10/30/24 1550   Pulse 74 10/30/24 1550   /66 10/30/24 1550   Resp 18 10/30/24 1550   SpO2 95 % 10/30/24 1550       Modified Delores:  No data recorded

## 2024-10-30 NOTE — ANESTHESIA PREPROCEDURE EVALUATION
Procedure:  IR LOWER EXTREMITY ANGIOGRAM    Relevant Problems   CARDIO   (+) CAD (coronary artery disease)      /RENAL   (+) Prostate cancer (HCC)      HEMATOLOGY   (+) Anemia      NEURO/PSYCH   (+) Depression, recurrent (HCC)   (+) Moderate vascular dementia (HCC)      Other   (+) Osteomyelitis (HCC)        Physical Exam    Airway    Mallampati score: II  TM Distance: >3 FB  Neck ROM: full     Dental   No notable dental hx     Cardiovascular  Cardiovascular exam normal    Pulmonary  Pulmonary exam normal     Other Findings        Anesthesia Plan  ASA Score- 4     Anesthesia Type- general with ASA Monitors.         Additional Monitors:     Airway Plan:            Plan Factors-Exercise tolerance (METS): >4 METS.    Chart reviewed.   Existing labs reviewed. Patient summary reviewed.    Patient is not a current smoker.      Obstructive sleep apnea risk education given perioperatively.        Induction- intravenous.    Postoperative Plan- Plan for postoperative opioid use.     Perioperative Resuscitation Plan - Level 1 - Full Code.       Informed Consent- Anesthetic plan and risks discussed with patient.  I personally reviewed this patient with the CRNA. Discussed and agreed on the Anesthesia Plan with the CRNA..

## 2024-10-30 NOTE — BRIEF OP NOTE (RAD/CATH)
INTERVENTIONAL RADIOLOGY PROCEDURE NOTE    Date: 10/30/2024    Procedure: RLE angiogram  Procedure Summary       Date:  Room / Location:     Anesthesia Start:  Anesthesia Stop:     Procedure:  Diagnosis:     Scheduled Providers:  Responsible Provider:     Anesthesia Type: Not recorded ASA Status: Not recorded            Preoperative diagnosis:   1. Osteomyelitis of right foot, unspecified type (HCC)         Postoperative diagnosis: Same.    Surgeon: Kavin Rueda MD     Assistant: None. No qualified resident was available.    Blood loss: 50 mL    Specimens: None     Findings:   Occluded right SFA recanalized followed by mechanical thrombectomy and 5/6 mm balloon angioplasty.  Dissection present in the distal SFA following angioplasty.  Additional 6 mm x 60 mm Bernadine drug eluting stent placed overlapping the existing distal SFA stent.  Focal non flow limiting stenosis in the right popliteal artery.  Remainder of popliteal artery was patent.  Right anterior tibial, posterior tibial, and peroneal arteries showed multifocal occlusions.  No distal target present for revascularization.    Complications: None immediate.    Anesthesia: local and general          Vascular Quality Initiative - Peripheral Vascular Intervention     Urgency: Urgent    Functional Status:  Capable of only limited self-care, confined to bed or chair 50% or more of waking hours.   Ambulation: Amb w/ assistance = ambulation requires use of cane, walker, person, etc    Leg Symptoms    Right: Ulcer/necrosis (gangrene): de alfredo tissue loss due to peripheral arterial disease, not due to non-healing prior amputation       Tissue Loss Severity: Grade 2, Deep = deeper full thickness ulcer or necrosis (gangrene) on distal leg or foot with exposed bone, joint, or tendon, or shallow heel ulcer without involvement of the calcaneus (ie, major tissue loss: salvageable with 3 digital amputations or standard transmetatarsal amputation [TMA] plus skin coverage).      Infection: Grade 2, Moderate = Local infection is present as defined for Grade 1, but extends >2 cm around ulcer, or involves structures deeper than the skin and subcutaneous tissues (eg, abscess, osteomyelitis, septic arthritis, fasciitis). No clinical signs of systemic inflammatory response.  Left: Asymptomatic:  documented peripheral arterial disease without symptoms of claudication or ischemic pain      Treatment of Native Artery to Maintain Bypass Patency?:  Yes    COVID Information  COVID Symptoms Pre-Procedure: Asymptomatic    Treatment Delayed by Pandemic: None    Access   Number of Sites: 1     Access Site 1:     Side 1: Left    Site 1: Femoral Retro to Antegrade    Access Guidance 1:U/S    Largest Sheath Size 1: 6 Fr.    Closure Device 1: Perclose      Number of Closure Devices: 1     Closure Device Outcome: Closure device successful         Procedure  Fluoro Time: 24.3 minutes  Contrast Volume: Visipaque 200 ml  DAP: 99 Gy.cm2  CO2: no  Anticoagulant: Heparin  Protamine: No  If Creatinine is > 1.2 or missing, ZULEIKA Prophylaxis none     Treatment Details  Indication: Occlusive Disease,    Completion Assessment  Artery 1 treated: SFA        Right               Outflow: AT,PT,Peroneal: 0                  Was this Site previously treated?: Yes, Stent          TASC Grade: C          Total Treated Length: 30 cm          Total Occluded Length: 30 cm          Calcification: Moderate (calcification on both sides of artery < half length of lesion)          Number of Treatment types (Devices):   3           Device 1          Treatment Type: Plain Balloon         Device 2          Treatment Type: Special Balloon,  Drug Coated Balloon                Diameter: 5 mm          Length: 200 mm, 150 mm              Device 3          Treatment Type: Stent,  Drug Eluting Stent                Diameter: 6 mm          Length: 60 mm          Concomitant: Thrombectomy, Mechanical    Other:  Rotarex          Technical result:  Successful (stenosis <=30%)      None     Post Procedure  Patient currently taking: Statin, Yes      Antiplatelet Medication, Yes    Procedure Complications: No

## 2024-10-30 NOTE — ASSESSMENT & PLAN NOTE
Patient with metastatic prostate cancer  Currently on chemotherapy with Jevtana every 3 weeks  Continue prednisone  Follows with Dr. Hernandez, [facility]

## 2024-10-30 NOTE — ANESTHESIA POSTPROCEDURE EVALUATION
Post-Op Assessment Note    CV Status:  Stable  Pain Score: 0    Pain management: adequate       Mental Status:  Awake   Hydration Status:  Stable   PONV Controlled:  None   Airway Patency:  Patent  Two or more mitigation strategies used for obstructive sleep apnea   Post Op Vitals Reviewed: Yes    No anethesia notable event occurred.    Staff: CRNA   Comments: spontaneously breathing, protecting airway, vss, transported with full monitoring to PACU, fully endorsed w/o AC          Last Filed PACU Vitals:  Vitals Value Taken Time   Temp     Pulse 87    /54    Resp 12    SpO2 99        Modified Delores:  No data recorded

## 2024-10-30 NOTE — CONSULTS
Consultation - Vascular Surgery   Name: Royce Rene 78 y.o. male I MRN: [id number]  Unit/Bed#: 4 [place] 417-02 I Date of Admission: 10/28/2024   Date of Service: 10/30/2024 I Hospital Day: 2   Inpatient consult to Vascular Surgery  Consult performed by: Cody Andrea PA-C  Consult ordered by: Brigitte Cobb PA-C        Assessment:  Ulcer of Right hallux OM and underlying PAD  Chronic ischemia       Plan:  -Acute on chronic R foot ulceration with pain  -Hx R fem endarterectomy 2021 and R SFA Stockwave with PORTIA and treatment of R popliteal and PT arteries with PTA 2023.  - right lower extremity angiogram scheduled for today with IR  -Podiatry following  -Continue with medical therapy antiplatelets and atorvastatin 80  -Currently on antibiotics with cefepime and vancomycin.   -Remainder of care as per internal medicine          Reason for consultation:  R great toe diabetic ulcer with peripheral arterial disease    HPI: Royce Rene is a 78 y.o. male met prostate CA on treatment followed by oncology and palliative care, hypertension, hyperlipidemia, vascular dementia, CAD s/p cor stent, s/p TAVR, HFrEF, BiV ICD, PAD s/p R fem endart '21, s/p R LE intervention s/p R SFA 4/18/23 for right foot wound who is now admitted to [facility] with R foot wounds and hallux ulceration.    He was treated in 2023 for long right SFA occlusion with successful recanalization using Shockwave IVL and 6 mm PORTIA, focal AK pop treated with 5 mm IVL and PTA, and focal proximal R PT and diffuse distal disease treat wit 3mm and 2mm PTA.    Patient reports that wound healed in April, but he uses a scooter and bumped his foot a lot.  He did jam it against a refrigerator while riding his scooter which caused open wound.  Unclear how long it has been since he is walked, he admits to 1 month stating that he has had foot pain mostly in the toe which worsens if he puts any pressure on the foot so he has not been walking.  He states that he has had  chronic neuropathy in the plantar aspect of the right foot since prior to his last procedure.  He is currently being treated with chemotherapy as well.    Imaging:    HEATHER 10/23/24 -      Segment                Right                   Left                                            Impression  PSV (cm/s)  Impression  PSV (cm/s)    Common Femoral Artery                      77                      57    Prox Profunda                              84                            Prox SFA               Occluded            69  Occluded           323    Mid SFA                Occluded                Occluded            19    Dist SFA               Occluded                                    16    Proximal Pop                              109                      17    Distal Pop                                 17                      22    Dist Post Tibial                           16                      20    Dist. Ant. Tibial      Occluded                Occluded                           CONCLUSION:  Impression:  RIGHT LOWER LIMB:  High grade stenosis versus occlusion in the proximal, mid and distal  superficial femoral artery with reconstitution distally.  Ankle/Brachial index:   0.39 (Ischemic Range)  Prior 0.58  PVR/ PPG tracings are obliterated.  Metatarsal pressure could not be recorded due to obliterated wave-form.  Great toe pressure could not be recorded due to obliterated wave-form.  Compared to previous study on 07/26/23 , there is high grade stenosis versus  occlusion in the proximal, mid and distal superficial femoral artery with  reconstitution distally.     LEFT LOWER LIMB:  High grade stenosis versus occlusion in the proximal and mid superficial  femoral artery with reconstitution distally.  Ankle/Brachial index:   Non-compressible.  Prior Non-compressible.  PVR/ PPG tracings are obliterated.  PVR/ PPG tracings are obliterated.  Metatarsal pressure could not be recorded due to obliterated  wave-form.  Compared to previous study on 07/26/23, high grade stenosis versus occlusion in  the proximal and mid superficial femoral artery with reconstitution distally.        HEATHER 7/26/23       Right                  PSV (cm/s)    Common Femoral Artery         142    Prox Profunda                 114    Prox SFA - Stent              114    Mid SFA - Stent               129    Dist SFA - Stent              117    Proximal Pop                   44    Distal Pop                     46    Dist Post Tibial               48    Dist. Ant. Tibial              33             CONCLUSION:  Impression:  RIGHT LOWER LIMB:  Evaluation shows a patent superficial femoral artery S/P Stent placement.  Ankle/Brachial index: 0.58 (Severe Range) Prior:  Non-compressible.  Metatarsal pressure of 63mmHg.  Prior Not recorded.  Great toe pressure of 29mmHg, within the healing range.  Prior Not recorded.  PVR/ PPG tracings are dampened.     LEFT LOWER LIMB:  Ankle/Brachial index: 1.56, (Unreliable). Prior: Non-compressible.  Metatarsal pressure of 84mmHg.  Prior 116mmHg.  Great toe pressure of 34 mmHg, within the healing range.  Prior 71mmHG.  PVR/ PPG tracings are dampened.     Compared to previous study on 04/05/23 , there is a patent right SFA and stent.      XR great toe R 10/22/24  Irregularity of the soft tissues of the great toe as well as a small focus of lucency in the distal aspect of the first distal phalanx suspicious for osteomyelitis. MRI may be performed for further evaluation as clinically warranted.     Review of Systems:  Review of systems as above.       Past Medical History:  Past Medical History:   Diagnosis Date    Cancer (Formerly [facility]) 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    Coronary artery disease 2001    S/p stenting to circumflex and RCA    HFrEF (heart failure with reduced ejection fraction) (Formerly [facility]) 06/14/2021    High cholesterol     Prostate cancer (Formerly [facility])        Past Surgical History:  Past Surgical History:   Procedure Laterality Date     CARDIAC ELECTROPHYSIOLOGY PROCEDURE N/A 2021    Procedure: Implant ICD - bi ventricular;  Surgeon: Jonas Oviedo MD;  Location: [facility] CARDIAC CATH LAB;  Service: Cardiology    CARDIAC SURGERY      IR LOWER EXTREMITY ANGIOGRAM  2023    [place] TEAEC W/WO PATCH GRAFT COMMON FEMORAL Right 2021    Procedure: ENDARTERECTOMY ARTERIAL FEMORAL;  Surgeon: Serina Cook DO;  Location: [facility];  Service: Vascular       Social History:  Social History     Substance and Sexual Activity   Alcohol Use Not Currently     Social History     Substance and Sexual Activity   Drug Use Not Currently     Social History     Tobacco Use   Smoking Status Former    Current packs/day: 0.00    Types: Cigarettes    Start date: 1962    Quit date: 2002    Years since quittin.3    Passive exposure: Past   Smokeless Tobacco Never       Family History:  Family History   Problem Relation Age of Onset    Cancer Mother        Allergies:  No Known Allergies    Medications:    Current Facility-Administered Medications:     acetaminophen (TYLENOL) tablet 650 mg, Q6H PRN    aspirin chewable tablet 81 mg, Daily    atorvastatin (LIPITOR) tablet 80 mg, Daily With Dinner    bismuth subsalicylate (PEPTO BISMOL) oral suspension 524 mg, Q6H PRN    cefepime (MAXIPIME) IVPB (premix in dextrose) 2,000 mg 50 mL, Q12H, Last Rate: 2,000 mg (10/30/24 0239)    clopidogrel (PLAVIX) tablet 75 mg, Daily    enoxaparin (LOVENOX) subcutaneous injection 40 mg, Q24H DEWAYNE    ezetimibe (ZETIA) tablet 10 mg, Daily    furosemide (LASIX) tablet 20 mg, Daily    gabapentin (NEURONTIN) capsule 600 mg, BID    insulin lispro (HumALOG/ADMELOG) 100 units/mL subcutaneous injection 1-5 Units, TID AC **AND** Fingerstick Glucose (POCT), TID AC    lidocaine 1% buffered, PRN    losartan (COZAAR) tablet 12.5 mg, Daily    methocarbamol (ROBAXIN) tablet 500 mg, Q8H PRN    metoprolol succinate (TOPROL-XL) 24 hr tablet 25 mg, Daily    oxyCODONE (ROXICODONE) IR tablet 5 mg,  "Q4H PRN    potassium chloride (Klor-Con M20) CR tablet 20 mEq, Daily    predniSONE tablet 5 mg, BID With Meals    vancomycin (VANCOCIN) IVPB (premix in dextrose) 1,000 mg 200 mL, Q12H, Last Rate: 1,000 mg (10/30/24 0859)    Facility-Administered Medications Ordered in Other Encounters:     fentaNYL injection, PRN    heparin (porcine) injection, PRN    lidocaine (PF) (XYLOCAINE-MPF) 1 % injection, PRN    midazolam (VERSED) injection, PRN    norepinephrine (LEVOPHED) 4 mg (STANDARD CONCENTRATION) IV in sodium chloride 0.9% 250 mL, Continuous PRN    norepinephrine (LEVOPHED) 4 mg (STANDARD CONCENTRATION) IV in sodium chloride 0.9% 250 mL, Continuous PRN, Last Rate: 1 mcg/min (10/30/24 1344)    ondansetron (ZOFRAN) injection, PRN    phenylephrine (TAMIKO-SYNEPHRINE) 50 mg (STANDARD CONCENTRATION) in sodium chloride 0.9% 250 mL, Continuous PRN, Last Rate: 20 mcg/min (10/30/24 1327)    phenylephrine bolus from bag, PRN    propofol (DIPRIVAN) 200 MG/20ML bolus injection, PRN    sodium chloride 0.9 % infusion, Continuous PRN    Vitals:  /78   Pulse 71   Temp 98.3 °F (36.8 °C)   Resp 16   Ht 5' 7\" (1.702 m)   Wt 99.8 kg (220 lb)   SpO2 93%   BMI 34.46 kg/m²     I/Os:  I/O last 24 hours:  In: 1760 [P.O.:1400; I.V.:360]  Out: 1720 [Urine:1720]    Lab Results and Cultures:   Lab Results   Component Value Date    WBC 9.00 10/30/2024    HGB 10.6 (L) 10/30/2024    HCT 33.3 (L) 10/30/2024    MCV 97 10/30/2024     10/30/2024     Lab Results   Component Value Date    CALCIUM 8.2 (L) 10/30/2024    K 4.0 10/30/2024    CO2 24 10/30/2024     (H) 10/30/2024    BUN 20 10/30/2024    CREATININE 0.74 10/30/2024     Lab Results   Component Value Date    INR 0.98 10/30/2024    INR 1.13 04/03/2023    INR 0.98 12/23/2021    PROTIME 13.5 10/30/2024    PROTIME 14.9 (H) 04/03/2023    PROTIME 12.6 12/23/2021       Lipid Panel: No results found for: \"CHOL\",     Blood Culture:   Lab Results   Component Value Date    BLOODCX No " "Growth After 5 Days. 04/03/2023   ,   Urinalysis:   Lab Results   Component Value Date    COLORU Yellow 01/18/2024    CLARITYU Clear 01/18/2024    SPECGRAV 1.020 01/18/2024    PHUR 5.5 01/18/2024    PHUR 5.5 02/14/2022    LEUKOCYTESUR Negative 01/18/2024    NITRITE Negative 01/18/2024    GLUCOSEU 3+ (A) 01/18/2024    KETONESU Negative 01/18/2024    BILIRUBINUR Negative 01/18/2024    BLOODU Negative 01/18/2024   ,   Urine Culture: No results found for: \"URINECX\",   Wound Culure:   Lab Results   Component Value Date    WOUNDCULT 2+ Growth of Pseudomonas aeruginosa (A) 10/23/2024    WOUNDCULT 1+ Growth of 10/23/2024           Physical Exam:    General appearance: alert and oriented, in no acute distress.   Skin: Skin color, texture, turgor normal. No rashes or lesions  Neurologic: Grossly normal  Head: Normocephalic, without obvious abnormality, atraumatic  Neck:  No JVD appreciated.  Lungs: overall cta, Non-labored  Chest wall: Port; L chest  Heart: RRR   Abdomen: Obese, soft, non tender  Extremities: chronic neuropahty  R hallux ulceration. L lateral foot eschar.     Wound/Incision:                  Pulse exam:  Radial: Right: 2+ Left:: 1+  Femoral: Right: 1+ Left: 1+  DP: Right: non-palpable Left: non-palpable  PT: Right: doppler signal and non-palpable Left: doppler signal and non-palpable      Cody Andrea PA-C  10/30/2024       "

## 2024-10-30 NOTE — PROGRESS NOTES
Progress Note - Hospitalist   Name: Royce Rene 78 y.o. male I MRN: [id number]  Unit/Bed#: [address] [address]-02 I Date of Admission: 10/28/2024   Date of Service: 10/30/2024 I Hospital Day: 2    Assessment & Plan  Osteomyelitis (Formerly [facility])  Patient originally presented to [facility] with worsening wound on right great toe since hitting it recently.  XR R foot suspicious for osteomyelitis of great toe  Wound culture grew pseudomonas  Currently on cefepime and vancomycin  Podiatry and vascular surgery consulted  Angiogram planned for today  Repeat XR R foot pending  Possible surgical intervention per podiatry  PAD (peripheral artery disease) (Formerly [facility])  History of peripheral artery disease s/p prior stenting and angioplasty.  He has had endarterectomy and profundoplasty by Dr. Cook in July 2021  Lower extremity arterial duplex demonstrating high-grade stenosis versus occlusion in the proximal mid and distal superficial femoral artery  Vascular surgery and IR consulted  Continue aspirin, plavix, statin  Angiogram planned for today  Chronic combined systolic and diastolic CHF (congestive heart failure) (Formerly [facility])  Wt Readings from Last 3 Encounters:   10/30/24 99.8 kg (220 lb)   10/28/24 98.9 kg (218 lb 0.6 oz)   10/09/24 100 kg (221 lb 5.5 oz)     ECHO 8/2023: EF 30-35%, systolic function severely reduced, diastolic function abnormal consistent with grade I relaxation  Continue Lasix 20 mg daily, Toprol XL 25 mg daily  Appears euvolemic  Monitor Is/Os and daily weights  CAD (coronary artery disease)  Continue aspirin, statin, plavix, and beta-blocker therapy  Malignant neoplasm metastatic to bone (Formerly [facility])  Patient with metastatic prostate cancer  Currently on chemotherapy with Jevtana every 3 weeks  Continue prednisone  Follows with Dr. Hernandez, [facility] oncology  Moderate vascular dementia (Formerly [facility])  Continue supportive care  Patient's son is primary caregiver    VTE Pharmacologic Prophylaxis:   Moderate Risk (Score 3-4) - Pharmacological  DVT Prophylaxis Ordered: enoxaparin (Lovenox).    Mobility:   Basic Mobility Inpatient Raw Score: 17  JH-HLM Goal: 5: Stand one or more mins  JH-HLM Achieved: 6: Walk 10 steps or more  JH-HLM Goal achieved. Continue to encourage appropriate mobility.    Patient Centered Rounds: I performed bedside rounds with nursing staff today.   Discussions with Specialists or Other Care Team Provider: Yes    Education and Discussions with Family / Patient: Attempted to update  (son) via phone. Left voicemail.     Current Length of Stay: 2 day(s)  Current Patient Status: Inpatient   Certification Statement: The patient will continue to require additional inpatient hospital stay due to angiogram and possible surgical therpay.  Discharge Plan: Anticipate discharge in >72 hrs to discharge location to be determined pending rehab evaluations.    Code Status: Level 1 - Full Code    Subjective   Laying in bed this AM. Reports pain in his toe that he says is controlled with pain medications. No other acute complaints. NPO awaiting angiogram today. Has been eating well previously.     Objective :  Temp:  [98.3 °F (36.8 °C)-98.7 °F (37.1 °C)] 98.3 °F (36.8 °C)  HR:  [71-82] 71  BP: ()/(53-78) 129/78  Resp:  [16-20] 16  SpO2:  [91 %-95 %] 93 %  O2 Device: None (Room air)    Body mass index is 34.46 kg/m².     Input and Output Summary (last 24 hours):     Intake/Output Summary (Last 24 hours) at 10/30/2024 0915  Last data filed at 10/30/2024 0700  Gross per 24 hour   Intake 900 ml   Output 1370 ml   Net -470 ml       Physical Exam  Constitutional:       Appearance: Normal appearance.   HENT:      Head: Normocephalic and atraumatic.   Cardiovascular:      Rate and Rhythm: Normal rate and regular rhythm.   Pulmonary:      Effort: Pulmonary effort is normal.      Breath sounds: Normal breath sounds. No wheezing or rales.   Abdominal:      General: There is no distension.      Palpations: Abdomen is soft.      Tenderness:  There is no abdominal tenderness.   Musculoskeletal:         General: No swelling.   Skin:     General: Skin is warm and dry.      Findings: Wound present.      Comments: Wound on the right toe with dressing c/d/i. Tender.    Neurological:      General: No focal deficit present.      Mental Status: He is alert and oriented to person, place, and time.           Lines/Drains:  Lines/Drains/Airways       Active Status       Name Placement date Placement time Site Days    Port A Cath 10/28/20 Right Chest 10/28/20  0900  Chest  1463                    Central Line:  Goal for removal: N/A - Chronic PICC               Lab Results: I have reviewed the following results:   Results from last 7 days   Lab Units 10/30/24  0606 10/29/24  0501 10/27/24  0529 10/26/24  0453   WBC Thousand/uL 9.00   < > 9.22 7.64   HEMOGLOBIN g/dL 10.6*   < > 12.1 11.3*   HEMATOCRIT % 33.3*   < > 38.1 35.2*   PLATELETS Thousands/uL 166   < > 169 152   BANDS PCT %  --   --   --  1   SEGS PCT %  --   --  79*  --    LYMPHO PCT %  --   --  12* 12*   MONO PCT %  --   --  7 7   EOS PCT %  --   --  0 0    < > = values in this interval not displayed.     Results from last 7 days   Lab Units 10/30/24  0606 10/27/24  0529 10/26/24  0453   SODIUM mmol/L 139   < > 139   POTASSIUM mmol/L 4.0   < > 3.9   CHLORIDE mmol/L 111*   < > 108   CO2 mmol/L 24   < > 23   BUN mg/dL 20   < > 21   CREATININE mg/dL 0.74   < > 0.73   ANION GAP mmol/L 4   < > 8   CALCIUM mg/dL 8.2*   < > 8.5   ALBUMIN g/dL  --   --  3.6   TOTAL BILIRUBIN mg/dL  --   --  0.86   ALK PHOS U/L  --   --  63   ALT U/L  --   --  24   AST U/L  --   --  15   GLUCOSE RANDOM mg/dL 80   < > 86    < > = values in this interval not displayed.     Results from last 7 days   Lab Units 10/30/24  0606   INR  0.98     Results from last 7 days   Lab Units 10/30/24  0731 10/29/24  2013 10/29/24  1609 10/29/24  1112 10/29/24  0751 10/28/24  2046 10/28/24  1552 10/28/24  1109 10/28/24  0659 10/27/24  2102  10/27/24  1550 10/27/24  1126   POC GLUCOSE mg/dl 101 106 140 106 87 190* 165* 102 86 139 139 108               Recent Cultures (last 7 days):   Results from last 7 days   Lab Units 10/23/24  0931   GRAM STAIN RESULT  No Polys or Bacteria seen   WOUND CULTURE  2+ Growth of Pseudomonas aeruginosa*  1+ Growth of       Imaging Results Review: I reviewed radiology reports from this admission including: xray(s) and Ultrasound(s).  Other Study Results Review: No additional pertinent studies reviewed.    Last 24 Hours Medication List:     Current Facility-Administered Medications:     acetaminophen (TYLENOL) tablet 650 mg, Q6H PRN    aspirin chewable tablet 81 mg, Daily    atorvastatin (LIPITOR) tablet 80 mg, Daily With Dinner    bismuth subsalicylate (PEPTO BISMOL) oral suspension 524 mg, Q6H PRN    cefepime (MAXIPIME) IVPB (premix in dextrose) 2,000 mg 50 mL, Q12H, Last Rate: 2,000 mg (10/30/24 0239)    clopidogrel (PLAVIX) tablet 75 mg, Daily    enoxaparin (LOVENOX) subcutaneous injection 40 mg, Q24H DEWAYNE    ezetimibe (ZETIA) tablet 10 mg, Daily    furosemide (LASIX) tablet 20 mg, Daily    gabapentin (NEURONTIN) capsule 600 mg, BID    insulin lispro (HumALOG/ADMELOG) 100 units/mL subcutaneous injection 1-5 Units, TID AC **AND** Fingerstick Glucose (POCT), TID AC    losartan (COZAAR) tablet 12.5 mg, Daily    methocarbamol (ROBAXIN) tablet 500 mg, Q8H PRN    metoprolol succinate (TOPROL-XL) 24 hr tablet 25 mg, Daily    oxyCODONE (ROXICODONE) IR tablet 5 mg, Q4H PRN    potassium chloride (Klor-Con M20) CR tablet 20 mEq, Daily    predniSONE tablet 5 mg, BID With Meals    vancomycin (VANCOCIN) IVPB (premix in dextrose) 1,000 mg 200 mL, Q12H, Last Rate: 1,000 mg (10/29/24 2034)    Administrative Statements   Today, Patient Was Seen By: Nish Hancock  I have spent a total time of 30 minutes in caring for this patient on the day of the visit/encounter including Diagnostic results, Prognosis, Impressions, and Obtaining or  reviewing history  .    **Please Note: This note may have been constructed using a voice recognition system.**

## 2024-10-30 NOTE — PLAN OF CARE
Problem: PAIN - ADULT  Goal: Verbalizes/displays adequate comfort level or baseline comfort level  Description: Interventions:  - Encourage patient to monitor pain and request assistance  - Assess pain using appropriate pain scale  - Administer analgesics based on type and severity of pain and evaluate response  - Implement non-pharmacological measures as appropriate and evaluate response  - Consider cultural and social influences on pain and pain management  - Notify physician/advanced practitioner if interventions unsuccessful or patient reports new pain  Outcome: Progressing     Problem: INFECTION - ADULT  Goal: Absence or prevention of progression during hospitalization  Description: INTERVENTIONS:  - Assess and monitor for signs and symptoms of infection  - Monitor lab/diagnostic results  - Monitor all insertion sites, i.e. indwelling lines, tubes, and drains  - Monitor endotracheal if appropriate and nasal secretions for changes in amount and color  - [place] appropriate cooling/warming therapies per order  - Administer medications as ordered  - Instruct and encourage patient and family to use good hand hygiene technique  - Identify and instruct in appropriate isolation precautions for identified infection/condition  Outcome: Progressing     Problem: SAFETY ADULT  Goal: Patient will remain free of falls  Description: INTERVENTIONS:  - Educate patient/family on patient safety including physical limitations  - Instruct patient to call for assistance with activity   - Consult OT/PT to assist with strengthening/mobility   - Keep Call bell within reach  - Keep bed low and locked with side rails adjusted as appropriate  - Keep care items and personal belongings within reach  - Initiate and maintain comfort rounds  - Make Fall Risk Sign visible to staff  - Offer Toileting every 2 Hours, in advance of need  - Initiate/Maintain bed alarm  - Obtain necessary fall risk management equipment: yes  - Apply yellow socks  and bracelet for high fall risk patients  - Consider moving patient to room near nurses station  Outcome: Progressing  Goal: Maintain or return to baseline ADL function  Description: INTERVENTIONS:  -  Assess patient's ability to carry out ADLs; assess patient's baseline for ADL function and identify physical deficits which impact ability to perform ADLs (bathing, care of mouth/teeth, toileting, grooming, dressing, etc.)  - Assess/evaluate cause of self-care deficits   - Assess range of motion  - Assess patient's mobility; develop plan if impaired  - Assess patient's need for assistive devices and provide as appropriate  - Encourage maximum independence but intervene and supervise when necessary  - Involve family in performance of ADLs  - Assess for home care needs following discharge   - Consider OT consult to assist with ADL evaluation and planning for discharge  - Provide patient education as appropriate  Outcome: Progressing  Goal: Maintains/Returns to pre admission functional level  Description: INTERVENTIONS:  - Perform AM-PAC 6 Click Basic Mobility/ Daily Activity assessment daily.  - Set and communicate daily mobility goal to care team and patient/family/caregiver.   - Collaborate with rehabilitation services on mobility goals if consulted  - Perform Range of Motion 3 times a day.  - Reposition patient every 2 hours.  - Dangle patient 3 times a day  - Stand patient 3 times a day  - Ambulate patient 3 times a day  - Out of bed to chair 3 times a day   - Out of bed for meals 3 times a day  - Out of bed for toileting  - Record patient progress and toleration of activity level   Outcome: Progressing     Problem: DISCHARGE PLANNING  Goal: Discharge to home or other facility with appropriate resources  Description: INTERVENTIONS:  - Identify barriers to discharge w/patient and caregiver  - Arrange for needed discharge resources and transportation as appropriate  - Identify discharge learning needs (meds, wound  care, etc.)  - Arrange for interpretive services to assist at discharge as needed  - Refer to Case Management Department for coordinating discharge planning if the patient needs post-hospital services based on physician/advanced practitioner order or complex needs related to functional status, cognitive ability, or social support system  Outcome: Progressing     Problem: Knowledge Deficit  Goal: Patient/family/caregiver demonstrates understanding of disease process, treatment plan, medications, and discharge instructions  Description: Complete learning assessment and assess knowledge base.  Interventions:  - Provide teaching at level of understanding  - Provide teaching via preferred learning methods  Outcome: Progressing     Problem: NEUROSENSORY - ADULT  Goal: Achieves stable or improved neurological status  Description: INTERVENTIONS  - Monitor and report changes in neurological status  - Monitor vital signs such as temperature, blood pressure, glucose, and any other labs ordered   - Initiate measures to prevent increased intracranial pressure  - Monitor for seizure activity and implement precautions if appropriate      Outcome: Progressing  Goal: Achieves maximal functionality and self care  Description: INTERVENTIONS  - Monitor swallowing and airway patency with patient fatigue and changes in neurological status  - Encourage and assist patient to increase activity and self care.   - Encourage visually impaired, hearing impaired and aphasic patients to use assistive/communication devices  Outcome: Progressing     Problem: METABOLIC, FLUID AND ELECTROLYTES - ADULT  Goal: Electrolytes maintained within normal limits  Description: INTERVENTIONS:  - Monitor labs and assess patient for signs and symptoms of electrolyte imbalances  - Administer electrolyte replacement as ordered  - Monitor response to electrolyte replacements, including repeat lab results as appropriate  - Instruct patient on fluid and nutrition as  appropriate  Outcome: Progressing  Goal: Fluid balance maintained  Description: INTERVENTIONS:  - Monitor labs   - Monitor I/O and WT  - Instruct patient on fluid and nutrition as appropriate  - Assess for signs & symptoms of volume excess or deficit  Outcome: Progressing  Goal: Glucose maintained within target range  Description: INTERVENTIONS:  - Monitor Blood Glucose as ordered  - Assess for signs and symptoms of hyperglycemia and hypoglycemia  - Administer ordered medications to maintain glucose within target range  - Assess nutritional intake and initiate nutrition service referral as needed  Outcome: Progressing     Problem: SKIN/TISSUE INTEGRITY - ADULT  Goal: Skin Integrity remains intact(Skin Breakdown Prevention)  Description: Assess:  -Perform Harshal assessment every shift  -Clean and moisturize skin every shift   -Inspect skin when repositioning, toileting, and assisting with ADLS  -Assess under medical devices such as masimo every shift   -Assess extremities for adequate circulation and sensation     Bed Management:  -Have minimal linens on bed & keep smooth, unwrinkled  -Change linens as needed when moist or perspiring  -Avoid sitting or lying in one position for more than 2 hours while in bed  -Keep HOB at 30 degrees     Toileting:  -Offer bedside commode  -Assess for incontinence every shift   -Use incontinent care products after each incontinent episode such as remedy    Activity:  -Mobilize patient 3 times a day  -Encourage activity and walks on unit  -Encourage or provide ROM exercises   -Turn and reposition patient every 2 Hours  -Use appropriate equipment to lift or move patient in bed  -Instruct/ Assist with weight shifting every 30 minutes  when out of bed in chair  -Consider limitation of chair time 2 hour intervals    Skin Care:  -Avoid use of baby powder, tape, friction and shearing, hot water or constrictive clothing  -Relieve pressure over bony prominences using wedges and pillows   -Do  not massage red bony areas    Next Steps:  -Teach patient strategies to minimize risks such as weight shifting    -Consider consults to  interdisciplinary teams   Outcome: Progressing  Goal: Incision(s), wounds(s) or drain site(s) healing without S/S of infection  Description: INTERVENTIONS  - Assess and document dressing, incision, wound bed, drain sites and surrounding tissue  - Provide patient and family education  - Perform skin care/dressing changes every 2 hours  Outcome: Progressing  Goal: Pressure injury heals and does not worsen  Description: Interventions:  - Implement low air loss mattress or specialty surface (Criteria met)  - Apply silicone foam dressing  - Instruct/assist with weight shifting every 120 minutes when in chair   - Limit chair time to 2 hour intervals  - Use special pressure reducing interventions such as waffle cushions when in chair   - Apply fecal or urinary incontinence containment device   - Perform passive or active ROM every 2 hours  - Turn and reposition patient & offload bony prominences every 2 hours   - Utilize friction reducing device or surface for transfers   - Consider nutrition services referral as needed  Outcome: Progressing     Problem: MUSCULOSKELETAL - ADULT  Goal: Maintain or return mobility to safest level of function  Description: INTERVENTIONS:  - Assess patient's ability to carry out ADLs; assess patient's baseline for ADL function and identify physical deficits which impact ability to perform ADLs (bathing, care of mouth/teeth, toileting, grooming, dressing, etc.)  - Assess/evaluate cause of self-care deficits   - Assess range of motion  - Assess patient's mobility  - Assess patient's need for assistive devices and provide as appropriate  - Encourage maximum independence but intervene and supervise when necessary  - Involve family in performance of ADLs  - Assess for home care needs following discharge   - Consider OT consult to assist with ADL evaluation and  planning for discharge  - Provide patient education as appropriate  Outcome: Progressing  Goal: Maintain proper alignment of affected body part  Description: INTERVENTIONS:  - Support, maintain and protect limb and body alignment  - Provide patient/ family with appropriate education  Outcome: Progressing

## 2024-10-30 NOTE — OCCUPATIONAL THERAPY NOTE
Occupational Therapy Cancellation     Patient Name: Royce Rene  Today's Date: 10/30/2024  Problem List  Principal Problem:    Osteomyelitis (HCC)  Active Problems:    Chronic combined systolic and diastolic CHF (congestive heart failure) (HCC)    CAD (coronary artery disease)    PAD (peripheral artery disease) (HCC)    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)    Moderate vascular dementia (HCC)    Ischemic pain of foot at rest    Ulcer of right foot with fat layer exposed (HCC)    Ulcer of left foot, limited to breakdown of skin (HCC)    Past Medical History  Past Medical History:   Diagnosis Date    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    Coronary artery disease 2001    S/p stenting to circumflex and RCA    HFrEF (heart failure with reduced ejection fraction) (HCC) 06/14/2021    High cholesterol     Prostate cancer (HCC)      Past Surgical History  Past Surgical History:   Procedure Laterality Date    CARDIAC ELECTROPHYSIOLOGY PROCEDURE N/A 12/23/2021    Procedure: Implant ICD - bi ventricular;  Surgeon: Jonas Oviedo MD;  Location: [facility];  Service: Cardiology    CARDIAC SURGERY      IR LOWER EXTREMITY ANGIOGRAM  4/18/2023    MD TEAEC W/WO PATCH GRAFT COMMON FEMORAL Right 7/9/2021    Procedure: ENDARTERECTOMY ARTERIAL FEMORAL;  Surgeon: Serina Cook DO;  Location: [facility];  Service: Vascular           10/30/24 1305   Note Type   Note type Cancelled Session  (Wed 10/30/24)   Cancel Reasons Patient off floor/test   Additional Comments Chart review completed and attempted to see pt for OT eval. Presently off floor. Will cancel and continue to follow as appropriate   Licensure   [place] License Number  Nancy Shields OTR/L [id number]     Nancy Shields OTR/L  [id number]  [id number]

## 2024-10-30 NOTE — ASSESSMENT & PLAN NOTE
History of peripheral artery disease s/p prior stenting and angioplasty.  He has had endarterectomy and profundoplasty by Dr. Cook in July 2021  Lower extremity arterial duplex demonstrating high-grade stenosis versus occlusion in the proximal mid and distal superficial femoral artery  Vascular surgery and IR consulted  Continue aspirin, plavix, statin  Angiogram planned for today

## 2024-10-30 NOTE — PROGRESS NOTES
[facility] EMERGENCY DEPT  EMERGENCY DEPARTMENT ENCOUNTER       Pt Name: Dayana Storm  MRN: [id number]  [place] 1973  Date of evaluation: 5/25/2023  Provider: Tamir Sarabia MD   PCP: None None  Note Started: 1:39 AM 5/26/23     CHIEF COMPLAINT       Chief Complaint   Patient presents with    Groin Swelling        HISTORY OF PRESENT ILLNESS: 1 or more elements      History From: Patient  None     Dayana Storm is a 48 y.o. male who presents as a transfer from at [facility], he is complaint of left testicular pain for the past 3/2 days, he had a painful left testicle, the previous provider thought there was a palpable mass, marked tenderness, no urinary symptoms of urgency frequency or dysuria, patient was tachycardic and elevated blood pressure at the time of that evaluation. Patient had labs with hyperglycemia without evidence of acidosis. In addition to that had a CT scan of the abdomen and pelvis with IV contrast which revealed a fat-containing right inguinal hernia but no left-sided inguinal hernia, no other acute findings to explain the patient's symptoms. Patient was supposed to be transferred via BLS, however he ended up driving here. He states that the pain is persistent and sharp in the left testicle. It radiates to the abdomen somewhat. He has had some nausea without vomiting. No blood in the stool black tarry stool or diarrhea. No reported fever or chills. Nursing Notes were all reviewed and agreed with or any disagreements were addressed in the HPI. REVIEW OF SYSTEMS      Review of Systems     Positives and Pertinent negatives as per HPI. PAST HISTORY     Past Medical History:  Past Medical History:   Diagnosis Date    Diabetes ([address]     Disc disorder     Sciatica          Past Surgical History:  Past Surgical History:   Procedure Laterality Date    APPENDECTOMY      ORTHOPEDIC SURGERY         Family History:  No family history on file.     Social History:  Social History Royce Rene is a 78 y.o. male who is currently ordered Vancomycin IV with management by the Pharmacy Consult service.  Relevant clinical data and objective / subjective history reviewed.  Vancomycin Assessment:  Indication and Goal AUC/Trough: Soft tissue (goal -600, trough >10), -600, trough >10  Clinical Status: stable  Micro:     Renal Function:  SCr: 0.74 mg/dL  CrCl: 91.2 mL/min  Renal replacement: Not on dialysis  Days of Therapy: 9  Current Dose: 1000mg IV q12h  Vancomycin Plan:  New Dosinmg IV q12h  Estimated AUC: 518 mcg*hr/mL  Estimated Trough: 15.8 mcg/mL  Next Level: 24 at 0600  Renal Function Monitoring: Daily BMP and UOP  Pharmacy will continue to follow closely for s/sx of nephrotoxicity, infusion reactions and appropriateness of therapy.  BMP and CBC will be ordered per protocol. We will continue to follow the patient’s culture results and clinical progress daily.    Maggy Solomon, Pharmacist

## 2024-10-31 LAB
ANION GAP SERPL CALCULATED.3IONS-SCNC: 7 MMOL/L (ref 4–13)
BUN SERPL-MCNC: 19 MG/DL (ref 5–25)
CALCIUM SERPL-MCNC: 7.2 MG/DL (ref 8.4–10.2)
CHLORIDE SERPL-SCNC: 110 MMOL/L (ref 96–108)
CO2 SERPL-SCNC: 21 MMOL/L (ref 21–32)
CREAT SERPL-MCNC: 0.79 MG/DL (ref 0.6–1.3)
ERYTHROCYTE [DISTWIDTH] IN BLOOD BY AUTOMATED COUNT: 15.1 % (ref 11.6–15.1)
GFR SERPL CREATININE-BSD FRML MDRD: 86 ML/MIN/1.73SQ M
GLUCOSE SERPL-MCNC: 125 MG/DL (ref 65–140)
GLUCOSE SERPL-MCNC: 128 MG/DL (ref 65–140)
GLUCOSE SERPL-MCNC: 130 MG/DL (ref 65–140)
GLUCOSE SERPL-MCNC: 78 MG/DL (ref 65–140)
GLUCOSE SERPL-MCNC: 89 MG/DL (ref 65–140)
HCT VFR BLD AUTO: 31.8 % (ref 36.5–49.3)
HGB BLD-MCNC: 9.9 G/DL (ref 12–17)
MCH RBC QN AUTO: 30.6 PG (ref 26.8–34.3)
MCHC RBC AUTO-ENTMCNC: 31.1 G/DL (ref 31.4–37.4)
MCV RBC AUTO: 98 FL (ref 82–98)
PLATELET # BLD AUTO: 163 THOUSANDS/UL (ref 149–390)
PMV BLD AUTO: 9.6 FL (ref 8.9–12.7)
POTASSIUM SERPL-SCNC: 4 MMOL/L (ref 3.5–5.3)
RBC # BLD AUTO: 3.24 MILLION/UL (ref 3.88–5.62)
SODIUM SERPL-SCNC: 138 MMOL/L (ref 135–147)
WBC # BLD AUTO: 9.18 THOUSAND/UL (ref 4.31–10.16)

## 2024-10-31 PROCEDURE — 85027 COMPLETE CBC AUTOMATED: CPT | Performed by: INTERNAL MEDICINE

## 2024-10-31 PROCEDURE — NC001 PR NO CHARGE: Performed by: PODIATRIST

## 2024-10-31 PROCEDURE — 80048 BASIC METABOLIC PNL TOTAL CA: CPT | Performed by: INTERNAL MEDICINE

## 2024-10-31 PROCEDURE — 99232 SBSQ HOSP IP/OBS MODERATE 35: CPT

## 2024-10-31 PROCEDURE — 82948 REAGENT STRIP/BLOOD GLUCOSE: CPT

## 2024-10-31 RX ADMIN — PREDNISONE 5 MG: 5 TABLET ORAL at 17:01

## 2024-10-31 RX ADMIN — GABAPENTIN 600 MG: 300 CAPSULE ORAL at 17:03

## 2024-10-31 RX ADMIN — PREDNISONE 5 MG: 5 TABLET ORAL at 08:03

## 2024-10-31 RX ADMIN — OXYCODONE HYDROCHLORIDE 5 MG: 5 TABLET ORAL at 04:08

## 2024-10-31 RX ADMIN — OXYCODONE HYDROCHLORIDE 5 MG: 5 TABLET ORAL at 17:01

## 2024-10-31 RX ADMIN — CEFEPIME HYDROCHLORIDE 2000 MG: 2 INJECTION, SOLUTION INTRAVENOUS at 15:02

## 2024-10-31 RX ADMIN — CEFEPIME HYDROCHLORIDE 2000 MG: 2 INJECTION, SOLUTION INTRAVENOUS at 04:12

## 2024-10-31 RX ADMIN — CLOPIDOGREL 75 MG: 75 TABLET ORAL at 08:03

## 2024-10-31 RX ADMIN — VANCOMYCIN HYDROCHLORIDE 1000 MG: 1 INJECTION, SOLUTION INTRAVENOUS at 08:02

## 2024-10-31 RX ADMIN — ENOXAPARIN SODIUM 40 MG: 40 INJECTION SUBCUTANEOUS at 08:03

## 2024-10-31 RX ADMIN — ASPIRIN 81 MG CHEWABLE TABLET 81 MG: 81 TABLET CHEWABLE at 08:03

## 2024-10-31 RX ADMIN — ATORVASTATIN CALCIUM 80 MG: 80 TABLET, FILM COATED ORAL at 17:01

## 2024-10-31 RX ADMIN — VANCOMYCIN HYDROCHLORIDE 1000 MG: 1 INJECTION, SOLUTION INTRAVENOUS at 20:00

## 2024-10-31 RX ADMIN — GABAPENTIN 600 MG: 300 CAPSULE ORAL at 08:03

## 2024-10-31 RX ADMIN — POTASSIUM CHLORIDE 20 MEQ: 1500 TABLET, EXTENDED RELEASE ORAL at 08:03

## 2024-10-31 RX ADMIN — EZETIMIBE 10 MG: 10 TABLET ORAL at 08:03

## 2024-10-31 NOTE — ASSESSMENT & PLAN NOTE
Wt Readings from Last 3 Encounters:   10/31/24 99.3 kg (219 lb)   10/28/24 98.9 kg (218 lb 0.6 oz)   10/09/24 100 kg (221 lb 5.5 oz)

## 2024-10-31 NOTE — ASSESSMENT & PLAN NOTE
History of peripheral artery disease s/p prior stenting and angioplasty.  He has had endarterectomy and profundoplasty by Dr. Cook in July 2021  Lower extremity arterial duplex demonstrating high-grade stenosis versus occlusion in the proximal mid and distal superficial femoral artery  Vascular surgery and IR consulted  S/p angiogram 10/30; occluded right SFA recanalized; right anterior tibial, posterior tibial, and peroneal arteries showed multifocal occlusions with no distal target present for revascularization  Continue aspirin, plavix, statin

## 2024-10-31 NOTE — QUICK NOTE
IR was able to recanalize occluded right SFA, however no tibial runoff due to multiple areas of tibial occlusions with no distal target.    Repeat KARLI's still showing RLE within ischemic range and inability to obtain GTPs.    Appears podiatry is awaiting  nuclear medicine scan  Will await their plan  Continue dual antiplatelet therapy.

## 2024-10-31 NOTE — PLAN OF CARE
Problem: PAIN - ADULT  Goal: Verbalizes/displays adequate comfort level or baseline comfort level  Description: Interventions:  - Encourage patient to monitor pain and request assistance  - Assess pain using appropriate pain scale  - Administer analgesics based on type and severity of pain and evaluate response  - Implement non-pharmacological measures as appropriate and evaluate response  - Consider cultural and social influences on pain and pain management  - Notify physician/advanced practitioner if interventions unsuccessful or patient reports new pain  Outcome: Progressing     Problem: INFECTION - ADULT  Goal: Absence or prevention of progression during hospitalization  Description: INTERVENTIONS:  - Assess and monitor for signs and symptoms of infection  - Monitor lab/diagnostic results  - Monitor all insertion sites, i.e. indwelling lines, tubes, and drains  - Monitor endotracheal if appropriate and nasal secretions for changes in amount and color  - [place] appropriate cooling/warming therapies per order  - Administer medications as ordered  - Instruct and encourage patient and family to use good hand hygiene technique  - Identify and instruct in appropriate isolation precautions for identified infection/condition  Outcome: Progressing     Problem: SAFETY ADULT  Goal: Patient will remain free of falls  Description: INTERVENTIONS:  - Educate patient/family on patient safety including physical limitations  - Instruct patient to call for assistance with activity   - Consult OT/PT to assist with strengthening/mobility   - Keep Call bell within reach  - Keep bed low and locked with side rails adjusted as appropriate  - Keep care items and personal belongings within reach  - Initiate and maintain comfort rounds  - Make Fall Risk Sign visible to staff  - Offer Toileting every 2 Hours, in advance of need  - Initiate/Maintain bed alarm  - Obtain necessary fall risk management equipment: yes  - Apply yellow socks  and bracelet for high fall risk patients  - Consider moving patient to room near nurses station  Outcome: Progressing  Goal: Maintain or return to baseline ADL function  Description: INTERVENTIONS:  -  Assess patient's ability to carry out ADLs; assess patient's baseline for ADL function and identify physical deficits which impact ability to perform ADLs (bathing, care of mouth/teeth, toileting, grooming, dressing, etc.)  - Assess/evaluate cause of self-care deficits   - Assess range of motion  - Assess patient's mobility; develop plan if impaired  - Assess patient's need for assistive devices and provide as appropriate  - Encourage maximum independence but intervene and supervise when necessary  - Involve family in performance of ADLs  - Assess for home care needs following discharge   - Consider OT consult to assist with ADL evaluation and planning for discharge  - Provide patient education as appropriate  Outcome: Progressing  Goal: Maintains/Returns to pre admission functional level  Description: INTERVENTIONS:  - Perform AM-PAC 6 Click Basic Mobility/ Daily Activity assessment daily.  - Set and communicate daily mobility goal to care team and patient/family/caregiver.   - Collaborate with rehabilitation services on mobility goals if consulted  - Perform Range of Motion 3 times a day.  - Reposition patient every 2 hours.  - Dangle patient 3 times a day  - Stand patient 3 times a day  - Ambulate patient 3 times a day  - Out of bed to chair 3 times a day   - Out of bed for meals 3 times a day  - Out of bed for toileting  - Record patient progress and toleration of activity level   Outcome: Progressing     Problem: DISCHARGE PLANNING  Goal: Discharge to home or other facility with appropriate resources  Description: INTERVENTIONS:  - Identify barriers to discharge w/patient and caregiver  - Arrange for needed discharge resources and transportation as appropriate  - Identify discharge learning needs (meds, wound  care, etc.)  - Arrange for interpretive services to assist at discharge as needed  - Refer to Case Management Department for coordinating discharge planning if the patient needs post-hospital services based on physician/advanced practitioner order or complex needs related to functional status, cognitive ability, or social support system  Outcome: Progressing     Problem: Knowledge Deficit  Goal: Patient/family/caregiver demonstrates understanding of disease process, treatment plan, medications, and discharge instructions  Description: Complete learning assessment and assess knowledge base.  Interventions:  - Provide teaching at level of understanding  - Provide teaching via preferred learning methods  Outcome: Progressing     Problem: NEUROSENSORY - ADULT  Goal: Achieves stable or improved neurological status  Description: INTERVENTIONS  - Monitor and report changes in neurological status  - Monitor vital signs such as temperature, blood pressure, glucose, and any other labs ordered   - Initiate measures to prevent increased intracranial pressure  - Monitor for seizure activity and implement precautions if appropriate      Outcome: Progressing  Goal: Achieves maximal functionality and self care  Description: INTERVENTIONS  - Monitor swallowing and airway patency with patient fatigue and changes in neurological status  - Encourage and assist patient to increase activity and self care.   - Encourage visually impaired, hearing impaired and aphasic patients to use assistive/communication devices  Outcome: Progressing     Problem: METABOLIC, FLUID AND ELECTROLYTES - ADULT  Goal: Electrolytes maintained within normal limits  Description: INTERVENTIONS:  - Monitor labs and assess patient for signs and symptoms of electrolyte imbalances  - Administer electrolyte replacement as ordered  - Monitor response to electrolyte replacements, including repeat lab results as appropriate  - Instruct patient on fluid and nutrition as  appropriate  Outcome: Progressing  Goal: Fluid balance maintained  Description: INTERVENTIONS:  - Monitor labs   - Monitor I/O and WT  - Instruct patient on fluid and nutrition as appropriate  - Assess for signs & symptoms of volume excess or deficit  Outcome: Progressing  Goal: Glucose maintained within target range  Description: INTERVENTIONS:  - Monitor Blood Glucose as ordered  - Assess for signs and symptoms of hyperglycemia and hypoglycemia  - Administer ordered medications to maintain glucose within target range  - Assess nutritional intake and initiate nutrition service referral as needed  Outcome: Progressing     Problem: SKIN/TISSUE INTEGRITY - ADULT  Goal: Skin Integrity remains intact(Skin Breakdown Prevention)  Description: Assess:  -Perform Harshal assessment every shift  -Clean and moisturize skin every shift   -Inspect skin when repositioning, toileting, and assisting with ADLS  -Assess under medical devices such as masimo every shift   -Assess extremities for adequate circulation and sensation     Bed Management:  -Have minimal linens on bed & keep smooth, unwrinkled  -Change linens as needed when moist or perspiring  -Avoid sitting or lying in one position for more than 2 hours while in bed  -Keep HOB at 30 degrees     Toileting:  -Offer bedside commode  -Assess for incontinence every shift   -Use incontinent care products after each incontinent episode such as remedy    Activity:  -Mobilize patient 3 times a day  -Encourage activity and walks on unit  -Encourage or provide ROM exercises   -Turn and reposition patient every 2 Hours  -Use appropriate equipment to lift or move patient in bed  -Instruct/ Assist with weight shifting every 30 minutes  when out of bed in chair  -Consider limitation of chair time 2 hour intervals    Skin Care:  -Avoid use of baby powder, tape, friction and shearing, hot water or constrictive clothing  -Relieve pressure over bony prominences using wedges and pillows   -Do  not massage red bony areas    Next Steps:  -Teach patient strategies to minimize risks such as weight shifting    -Consider consults to  interdisciplinary teams   Outcome: Progressing  Goal: Incision(s), wounds(s) or drain site(s) healing without S/S of infection  Description: INTERVENTIONS  - Assess and document dressing, incision, wound bed, drain sites and surrounding tissue  - Provide patient and family education  - Perform skin care/dressing changes every 2 hours  Outcome: Progressing     Problem: MUSCULOSKELETAL - ADULT  Goal: Maintain or return mobility to safest level of function  Description: INTERVENTIONS:  - Assess patient's ability to carry out ADLs; assess patient's baseline for ADL function and identify physical deficits which impact ability to perform ADLs (bathing, care of mouth/teeth, toileting, grooming, dressing, etc.)  - Assess/evaluate cause of self-care deficits   - Assess range of motion  - Assess patient's mobility  - Assess patient's need for assistive devices and provide as appropriate  - Encourage maximum independence but intervene and supervise when necessary  - Involve family in performance of ADLs  - Assess for home care needs following discharge   - Consider OT consult to assist with ADL evaluation and planning for discharge  - Provide patient education as appropriate  Outcome: Progressing  Goal: Maintain proper alignment of affected body part  Description: INTERVENTIONS:  - Support, maintain and protect limb and body alignment  - Provide patient/ family with appropriate education  Outcome: Progressing

## 2024-10-31 NOTE — OCCUPATIONAL THERAPY NOTE
Occupational Therapy [facility]     Patient Name: Royce Rene  Today's Date: 10/31/2024  Problem List  Principal Problem:    Osteomyelitis (HCC)  Active Problems:    Chronic combined systolic and diastolic CHF (congestive heart failure) (HCC)    CAD (coronary artery disease)    PAD (peripheral artery disease) (HCC)    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)    Moderate vascular dementia (HCC)    Ischemic pain of foot at rest    Ulcer of right foot with fat layer exposed (HCC)    Ulcer of left foot, limited to breakdown of skin (HCC)    Past Medical History  Past Medical History:   Diagnosis Date    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    Coronary artery disease 2001    S/p stenting to circumflex and RCA    HFrEF (heart failure with reduced ejection fraction) (HCC) 06/14/2021    High cholesterol     Prostate cancer (HCC)      Past Surgical History  Past Surgical History:   Procedure Laterality Date    CARDIAC ELECTROPHYSIOLOGY PROCEDURE N/A 12/23/2021    Procedure: Implant ICD - bi ventricular;  Surgeon: Jonas Oviedo MD;  Location: [facility];  Service: Cardiology    CARDIAC SURGERY      IR LOWER EXTREMITY ANGIOGRAM  4/18/2023    IR LOWER EXTREMITY ANGIOGRAM  10/30/2024    AZ TEAEC W/WO PATCH GRAFT COMMON FEMORAL Right 7/9/2021    Procedure: ENDARTERECTOMY ARTERIAL FEMORAL;  Surgeon: Serina Cook DO;  Location: [facility];  Service: Vascular         10/31/24 0920   Note Type   Note type Cancelled Session  (Thursday 10/31/24)   Cancel Reasons Other  (Pt declined participation until he has his foot / toe taken care of.)   Additional Comments OT Orders received and contact made w/ pt. Pt declined participation in OT eval despite education /encouragement. Pt stated that he can't do anything right now with his foot. Pt reports use of RW w/ surgical shoe to / from bathroom w/ RN staff and use of of urinal. Will continue to follow   Licensure   [place] License Number  RAKESH Crawford/BUFFY  [id number]       Nancy Shields, OTR/L  [id number]  [id number]

## 2024-10-31 NOTE — PLAN OF CARE
Problem: PAIN - ADULT  Goal: Verbalizes/displays adequate comfort level or baseline comfort level  Description: Interventions:  - Encourage patient to monitor pain and request assistance  - Assess pain using appropriate pain scale  - Administer analgesics based on type and severity of pain and evaluate response  - Implement non-pharmacological measures as appropriate and evaluate response  - Consider cultural and social influences on pain and pain management  - Notify physician/advanced practitioner if interventions unsuccessful or patient reports new pain  Outcome: Progressing     Problem: INFECTION - ADULT  Goal: Absence or prevention of progression during hospitalization  Description: INTERVENTIONS:  - Assess and monitor for signs and symptoms of infection  - Monitor lab/diagnostic results  - Monitor all insertion sites, i.e. indwelling lines, tubes, and drains  - Monitor endotracheal if appropriate and nasal secretions for changes in amount and color  - [place] appropriate cooling/warming therapies per order  - Administer medications as ordered  - Instruct and encourage patient and family to use good hand hygiene technique  - Identify and instruct in appropriate isolation precautions for identified infection/condition  Outcome: Progressing     Problem: SAFETY ADULT  Goal: Patient will remain free of falls  Description: INTERVENTIONS:  - Educate patient/family on patient safety including physical limitations  - Instruct patient to call for assistance with activity   - Consult OT/PT to assist with strengthening/mobility   - Keep Call bell within reach  - Keep bed low and locked with side rails adjusted as appropriate  - Keep care items and personal belongings within reach  - Initiate and maintain comfort rounds  - Make Fall Risk Sign visible to staff  - Offer Toileting every 2 Hours, in advance of need  - Initiate/Maintain bed alarm  - Obtain necessary fall risk management equipment: yes  - Apply yellow socks  and bracelet for high fall risk patients  - Consider moving patient to room near nurses station  Outcome: Progressing  Goal: Maintain or return to baseline ADL function  Description: INTERVENTIONS:  -  Assess patient's ability to carry out ADLs; assess patient's baseline for ADL function and identify physical deficits which impact ability to perform ADLs (bathing, care of mouth/teeth, toileting, grooming, dressing, etc.)  - Assess/evaluate cause of self-care deficits   - Assess range of motion  - Assess patient's mobility; develop plan if impaired  - Assess patient's need for assistive devices and provide as appropriate  - Encourage maximum independence but intervene and supervise when necessary  - Involve family in performance of ADLs  - Assess for home care needs following discharge   - Consider OT consult to assist with ADL evaluation and planning for discharge  - Provide patient education as appropriate  Outcome: Progressing  Goal: Maintains/Returns to pre admission functional level  Description: INTERVENTIONS:  - Perform AM-PAC 6 Click Basic Mobility/ Daily Activity assessment daily.  - Set and communicate daily mobility goal to care team and patient/family/caregiver.   - Collaborate with rehabilitation services on mobility goals if consulted  - Perform Range of Motion 3 times a day.  - Reposition patient every 2 hours.  - Dangle patient 3 times a day  - Stand patient 3 times a day  - Ambulate patient 3 times a day  - Out of bed to chair 3 times a day   - Out of bed for meals 3 times a day  - Out of bed for toileting  - Record patient progress and toleration of activity level   Outcome: Progressing     Problem: DISCHARGE PLANNING  Goal: Discharge to home or other facility with appropriate resources  Description: INTERVENTIONS:  - Identify barriers to discharge w/patient and caregiver  - Arrange for needed discharge resources and transportation as appropriate  - Identify discharge learning needs (meds, wound  care, etc.)  - Arrange for interpretive services to assist at discharge as needed  - Refer to Case Management Department for coordinating discharge planning if the patient needs post-hospital services based on physician/advanced practitioner order or complex needs related to functional status, cognitive ability, or social support system  Outcome: Progressing     Problem: Knowledge Deficit  Goal: Patient/family/caregiver demonstrates understanding of disease process, treatment plan, medications, and discharge instructions  Description: Complete learning assessment and assess knowledge base.  Interventions:  - Provide teaching at level of understanding  - Provide teaching via preferred learning methods  Outcome: Progressing     Problem: NEUROSENSORY - ADULT  Goal: Achieves stable or improved neurological status  Description: INTERVENTIONS  - Monitor and report changes in neurological status  - Monitor vital signs such as temperature, blood pressure, glucose, and any other labs ordered   - Initiate measures to prevent increased intracranial pressure  - Monitor for seizure activity and implement precautions if appropriate      Outcome: Progressing  Goal: Achieves maximal functionality and self care  Description: INTERVENTIONS  - Monitor swallowing and airway patency with patient fatigue and changes in neurological status  - Encourage and assist patient to increase activity and self care.   - Encourage visually impaired, hearing impaired and aphasic patients to use assistive/communication devices  Outcome: Progressing     Problem: METABOLIC, FLUID AND ELECTROLYTES - ADULT  Goal: Electrolytes maintained within normal limits  Description: INTERVENTIONS:  - Monitor labs and assess patient for signs and symptoms of electrolyte imbalances  - Administer electrolyte replacement as ordered  - Monitor response to electrolyte replacements, including repeat lab results as appropriate  - Instruct patient on fluid and nutrition as  appropriate  Outcome: Progressing  Goal: Fluid balance maintained  Description: INTERVENTIONS:  - Monitor labs   - Monitor I/O and WT  - Instruct patient on fluid and nutrition as appropriate  - Assess for signs & symptoms of volume excess or deficit  Outcome: Progressing  Goal: Glucose maintained within target range  Description: INTERVENTIONS:  - Monitor Blood Glucose as ordered  - Assess for signs and symptoms of hyperglycemia and hypoglycemia  - Administer ordered medications to maintain glucose within target range  - Assess nutritional intake and initiate nutrition service referral as needed  Outcome: Progressing     Problem: SKIN/TISSUE INTEGRITY - ADULT  Goal: Skin Integrity remains intact(Skin Breakdown Prevention)  Description: Assess:  -Perform Harshal assessment every shift  -Clean and moisturize skin every shift   -Inspect skin when repositioning, toileting, and assisting with ADLS  -Assess under medical devices such as masimo every shift   -Assess extremities for adequate circulation and sensation     Bed Management:  -Have minimal linens on bed & keep smooth, unwrinkled  -Change linens as needed when moist or perspiring  -Avoid sitting or lying in one position for more than 2 hours while in bed  -Keep HOB at 30 degrees     Toileting:  -Offer bedside commode  -Assess for incontinence every shift   -Use incontinent care products after each incontinent episode such as remedy    Activity:  -Mobilize patient 3 times a day  -Encourage activity and walks on unit  -Encourage or provide ROM exercises   -Turn and reposition patient every 2 Hours  -Use appropriate equipment to lift or move patient in bed  -Instruct/ Assist with weight shifting every 30 minutes  when out of bed in chair  -Consider limitation of chair time 2 hour intervals    Skin Care:  -Avoid use of baby powder, tape, friction and shearing, hot water or constrictive clothing  -Relieve pressure over bony prominences using wedges and pillows   -Do  not massage red bony areas    Next Steps:  -Teach patient strategies to minimize risks such as weight shifting    -Consider consults to  interdisciplinary teams   Outcome: Progressing  Goal: Incision(s), wounds(s) or drain site(s) healing without S/S of infection  Description: INTERVENTIONS  - Assess and document dressing, incision, wound bed, drain sites and surrounding tissue  - Provide patient and family education  - Perform skin care/dressing changes every 2 hours  Outcome: Progressing  Goal: Pressure injury heals and does not worsen  Description: Interventions:  - Implement low air loss mattress or specialty surface (Criteria met)  - Apply silicone foam dressing  - Instruct/assist with weight shifting every 120 minutes when in chair   - Limit chair time to 2 hour intervals  - Use special pressure reducing interventions such as waffle cushions when in chair   - Apply fecal or urinary incontinence containment device   - Perform passive or active ROM every 2 hours  - Turn and reposition patient & offload bony prominences every 2 hours   - Utilize friction reducing device or surface for transfers   - Consider nutrition services referral as needed  Outcome: Progressing     Problem: MUSCULOSKELETAL - ADULT  Goal: Maintain or return mobility to safest level of function  Description: INTERVENTIONS:  - Assess patient's ability to carry out ADLs; assess patient's baseline for ADL function and identify physical deficits which impact ability to perform ADLs (bathing, care of mouth/teeth, toileting, grooming, dressing, etc.)  - Assess/evaluate cause of self-care deficits   - Assess range of motion  - Assess patient's mobility  - Assess patient's need for assistive devices and provide as appropriate  - Encourage maximum independence but intervene and supervise when necessary  - Involve family in performance of ADLs  - Assess for home care needs following discharge   - Consider OT consult to assist with ADL evaluation and  planning for discharge  - Provide patient education as appropriate  Outcome: Progressing  Goal: Maintain proper alignment of affected body part  Description: INTERVENTIONS:  - Support, maintain and protect limb and body alignment  - Provide patient/ family with appropriate education  Outcome: Progressing

## 2024-10-31 NOTE — PROGRESS NOTES
Progress Note - Hospitalist   Name: Royce Rene 78 y.o. male I MRN: [id number]  Unit/Bed#: [address] [place] [address]02 I Date of Admission: 10/28/2024   Date of Service: 10/31/2024 I Hospital Day: 3    Assessment & Plan  Osteomyelitis ([facility])  Patient originally presented to [facility] with worsening wound on right great toe since hitting it recently.  XR R foot suspicious for osteomyelitis of great toe  Wound culture grew pseudomonas  Currently on cefepime and vancomycin  Podiatry and vascular surgery consulted  S/p Angiogram on 10/30 with recanalization of right SFA, no distal target present for distal revascularization  Repeat XR R foot shows no signs of osteomyelitis  Ceretec scan ordered per podiatry  PAD (peripheral artery disease) ([facility])  History of peripheral artery disease s/p prior stenting and angioplasty.  He has had endarterectomy and profundoplasty by Dr. Cook in July 2021  Lower extremity arterial duplex demonstrating high-grade stenosis versus occlusion in the proximal mid and distal superficial femoral artery  Vascular surgery and IR consulted  S/p angiogram 10/30; occluded right SFA recanalized; right anterior tibial, posterior tibial, and peroneal arteries showed multifocal occlusions with no distal target present for revascularization  Continue aspirin, plavix, statin  Chronic combined systolic and diastolic CHF (congestive heart failure) ([facility])  Wt Readings from Last 3 Encounters:   10/31/24 99.3 kg (219 lb)   10/28/24 98.9 kg (218 lb 0.6 oz)   10/09/24 100 kg (221 lb 5.5 oz)     ECHO 8/2023: EF 30-35%, systolic function severely reduced, diastolic function abnormal consistent with grade I relaxation  Continue Lasix 20 mg daily, Toprol XL 25 mg daily  Appears euvolemic  Monitor Is/Os and daily weights  CAD (coronary artery disease)  Continue aspirin, statin, plavix, and beta-blocker therapy  Malignant neoplasm metastatic to bone ([facility])  Patient with metastatic prostate cancer  Currently on chemotherapy with  Jesústana every 3 weeks  Continue prednisone  Follows with Dr. Hernandez, [facility] oncology  Moderate vascular dementia (HCC)  Continue supportive care  Patient's son is primary caregiver    VTE Pharmacologic Prophylaxis:   Moderate Risk (Score 3-4) - Pharmacological DVT Prophylaxis Ordered: enoxaparin (Lovenox).    Mobility:   Basic Mobility Inpatient Raw Score: 17  JH-HLM Goal: 5: Stand one or more mins  JH-HLM Achieved: 6: Walk 10 steps or more  JH-HLM Goal achieved. Continue to encourage appropriate mobility.    Patient Centered Rounds: I performed bedside rounds with nursing staff today.   Discussions with Specialists or Other Care Team Provider: Yes    Education and Discussions with Family / Patient: Attempted to update  (son) via phone. Left voicemail.     Current Length of Stay: 3 day(s)  Current Patient Status: Inpatient   Certification Statement: The patient will continue to require additional inpatient hospital stay due to ceretec scan and possible intervention  Discharge Plan: Anticipate discharge in 48-72 hrs to discharge location to be determined pending rehab evaluations.    Code Status: Level 1 - Full Code    Subjective   Patient resting comfortably in bed. Reports ongoing right toe pain that is relieved with oxycodone. Denies any new or worsening symptoms. Good appetite.    Objective :  Temp:  [97.2 °F (36.2 °C)-99.1 °F (37.3 °C)] 98.5 °F (36.9 °C)  HR:  [66-91] 91  BP: (100-136)/(47-76) 107/76  Resp:  [16-18] 18  SpO2:  [92 %-97 %] 93 %  O2 Device: None (Room air)    Body mass index is 34.3 kg/m².     Input and Output Summary (last 24 hours):     Intake/Output Summary (Last 24 hours) at 10/31/2024 0947  Last data filed at 10/31/2024 0412  Gross per 24 hour   Intake 1450 ml   Output 800 ml   Net 650 ml       Physical Exam  Constitutional:       Appearance: Normal appearance.   HENT:      Head: Normocephalic and atraumatic.   Cardiovascular:      Rate and Rhythm: Normal rate and regular  rhythm.   Pulmonary:      Effort: Pulmonary effort is normal.      Breath sounds: Normal breath sounds. No wheezing or rales.   Abdominal:      General: There is no distension.      Palpations: Abdomen is soft.      Tenderness: There is no abdominal tenderness.   Musculoskeletal:         General: No swelling.   Skin:     General: Skin is warm and dry.      Findings: Wound present.      Comments: Wound on the right toe with dressing c/d/i. Tender.    Neurological:      General: No focal deficit present.      Mental Status: He is alert and oriented to person, place, and time.           Lines/Drains:  Lines/Drains/Airways       Active Status       Name Placement date Placement time Site Days    Port A Cath 10/28/20 Right Chest 10/28/20  0900  Chest  1464                    Central Line:  Goal for removal: N/A - Chronic PICC               Lab Results: I have reviewed the following results:   Results from last 7 days   Lab Units 10/31/24  0430 10/29/24  0501 10/27/24  0529 10/26/24  0453   WBC Thousand/uL 9.18   < > 9.22 7.64   HEMOGLOBIN g/dL 9.9*   < > 12.1 11.3*   HEMATOCRIT % 31.8*   < > 38.1 35.2*   PLATELETS Thousands/uL 163   < > 169 152   BANDS PCT %  --   --   --  1   SEGS PCT %  --   --  79*  --    LYMPHO PCT %  --   --  12* 12*   MONO PCT %  --   --  7 7   EOS PCT %  --   --  0 0    < > = values in this interval not displayed.     Results from last 7 days   Lab Units 10/31/24  0430 10/27/24  0529 10/26/24  0453   SODIUM mmol/L 138   < > 139   POTASSIUM mmol/L 4.0   < > 3.9   CHLORIDE mmol/L 110*   < > 108   CO2 mmol/L 21   < > 23   BUN mg/dL 19   < > 21   CREATININE mg/dL 0.79   < > 0.73   ANION GAP mmol/L 7   < > 8   CALCIUM mg/dL 7.2*   < > 8.5   ALBUMIN g/dL  --   --  3.6   TOTAL BILIRUBIN mg/dL  --   --  0.86   ALK PHOS U/L  --   --  63   ALT U/L  --   --  24   AST U/L  --   --  15   GLUCOSE RANDOM mg/dL 78   < > 86    < > = values in this interval not displayed.     Results from last 7 days   Lab Units  10/30/24  0606   INR  0.98     Results from last 7 days   Lab Units 10/31/24  0703 10/30/24  2116 10/30/24  1732 10/30/24  1139 10/30/24  0731 10/29/24  2013 10/29/24  1609 10/29/24  1112 10/29/24  0751 10/28/24  2046 10/28/24  1552 10/28/24  1109   POC GLUCOSE mg/dl 89 147* 92 115 101 106 140 106 87 190* 165* 102               Recent Cultures (last 7 days):         Imaging Results Review: I reviewed radiology reports from this admission including: angiogram and xray(s).  Other Study Results Review: No additional pertinent studies reviewed.    Last 24 Hours Medication List:     Current Facility-Administered Medications:     acetaminophen (TYLENOL) tablet 650 mg, Q6H PRN    aspirin chewable tablet 81 mg, Daily    atorvastatin (LIPITOR) tablet 80 mg, Daily With Dinner    bismuth subsalicylate (PEPTO BISMOL) oral suspension 524 mg, Q6H PRN    cefepime (MAXIPIME) IVPB (premix in dextrose) 2,000 mg 50 mL, Q12H, Last Rate: 2,000 mg (10/31/24 0412)    clopidogrel (PLAVIX) tablet 75 mg, Daily    enoxaparin (LOVENOX) subcutaneous injection 40 mg, Q24H DEWAYNE    ezetimibe (ZETIA) tablet 10 mg, Daily    furosemide (LASIX) tablet 20 mg, Daily    gabapentin (NEURONTIN) capsule 600 mg, BID    insulin lispro (HumALOG/ADMELOG) 100 units/mL subcutaneous injection 1-5 Units, TID AC **AND** Fingerstick Glucose (POCT), TID AC    losartan (COZAAR) tablet 12.5 mg, Daily    meperidine (DEMEROL) injection 12.5 mg, Q10 Min PRN    methocarbamol (ROBAXIN) tablet 500 mg, Q8H PRN    metoprolol succinate (TOPROL-XL) 24 hr tablet 25 mg, Daily    ondansetron (ZOFRAN) injection 4 mg, Once PRN    oxyCODONE (ROXICODONE) IR tablet 5 mg, Q4H PRN    potassium chloride (Klor-Con M20) CR tablet 20 mEq, Daily    predniSONE tablet 5 mg, BID With Meals    sodium chloride 0.9 % infusion, Continuous, Last Rate: 0.3 mL/kg/hr (10/30/24 8149)    vancomycin (VANCOCIN) IVPB (premix in dextrose) 1,000 mg 200 mL, Q12H, Last Rate: 1,000 mg (10/31/24  3732)    Administrative Statements   Today, Patient Was Seen By: Nish Hancock  I have spent a total time of 30 minutes in caring for this patient on the day of the visit/encounter including Diagnostic results, Prognosis, Risks and benefits of tx options, Instructions for management, Reviewing / ordering tests, medicine, procedures  , and Obtaining or reviewing history  .    **Please Note: This note may have been constructed using a voice recognition system.**

## 2024-10-31 NOTE — ASSESSMENT & PLAN NOTE
Patient with metastatic prostate cancer  Currently on chemotherapy with Jevtana every 3 weeks  Continue prednisone  Follows with Dr. Hernandez, [facility]

## 2024-10-31 NOTE — ASSESSMENT & PLAN NOTE
Patient originally presented to [facility] with worsening wound on right great toe since hitting it recently.  XR R foot suspicious for osteomyelitis of great toe  Wound culture grew pseudomonas  Currently on cefepime and vancomycin  Podiatry and vascular surgery consulted  S/p Angiogram on 10/30 with recanalization of right SFA, no distal target present for distal revascularization  Repeat XR R foot shows no signs of osteomyelitis  Ceretec scan ordered per podiatry

## 2024-10-31 NOTE — PROGRESS NOTES
Royce Rene is a 78 y.o. male who is currently ordered Vancomycin IV with management by the Pharmacy Consult service.  Relevant clinical data and objective / subjective history reviewed.  Vancomycin Assessment:  Indication and Goal AUC/Trough: Soft tissue (goal -600, trough >10), -600, trough >10  Clinical Status: stable  Micro:   pending  Renal Function:  SCr: 0.79 mg/dL  CrCl: 86.5 mL/min  Renal replacement: Not on dialysis  Days of Therapy: 10  Current Dose: 1000mg IV q12h  Vancomycin Plan:  New Dosinmg IV q12h  Estimated AUC: 552 mcg*hr/mL  Estimated Trough: 17.2 mcg/mL  Next Level: 24 @ 0600  Renal Function Monitoring: Daily BMP and UOP  Pharmacy will continue to follow closely for s/sx of nephrotoxicity, infusion reactions and appropriateness of therapy.  BMP and CBC will be ordered per protocol. We will continue to follow the patient’s culture results and clinical progress daily.    Florida Garrett, Pharmacist

## 2024-10-31 NOTE — PROGRESS NOTES
Progress Note - Podiatry   Name: Royce Rene 78 y.o. male I MRN: [id number]  Unit/Bed#: [address] [place] [address] I Date of Admission: 10/28/2024   Date of Service: 10/31/2024 I Hospital Day: 3     Assessment & Plan  Osteomyelitis (HCC)    Chronic combined systolic and diastolic CHF (congestive heart failure) (HCC)  Wt Readings from Last 3 Encounters:   10/31/24 99.3 kg (219 lb)   10/28/24 98.9 kg (218 lb 0.6 oz)   10/09/24 100 kg (221 lb 5.5 oz)             CAD (coronary artery disease)    PAD (peripheral artery disease) (HCC)    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)    Moderate vascular dementia (HCC)    Ischemic pain of foot at rest    Ulcer of right foot with fat layer exposed (HCC)    Ulcer of left foot, limited to breakdown of skin (HCC)      Patient awaiting angiogram.    Latest x-ray demonstrates no evidence of occult osteomyelitis of hallux as clinically suspected.    Need to rule out osteomyelitis.  Patient cannot have MRI.  Therefore we will order nuclear medicine/Ceretec scan.    Podiatry will follow.  Await results.  Final podiatric treatment plan to be determined after nuclear medicine test in satisfactory angiogram.

## 2024-10-31 NOTE — ASSESSMENT & PLAN NOTE
Wt Readings from Last 3 Encounters:   10/31/24 99.3 kg (219 lb)   10/28/24 98.9 kg (218 lb 0.6 oz)   10/09/24 100 kg (221 lb 5.5 oz)     ECHO 8/2023: EF 30-35%, systolic function severely reduced, diastolic function abnormal consistent with grade I relaxation  Continue Lasix 20 mg daily, Toprol XL 25 mg daily  Appears euvolemic  Monitor Is/Os and daily weights

## 2024-11-01 ENCOUNTER — APPOINTMENT (OUTPATIENT)
Dept: RADIOLOGY | Facility: HOSPITAL | Age: 79
DRG: 271 | End: 2024-11-01
Payer: COMMERCIAL

## 2024-11-01 PROBLEM — I96 DRY GANGRENE (HCC): Status: ACTIVE | Noted: 2024-11-01

## 2024-11-01 LAB
ANION GAP SERPL CALCULATED.3IONS-SCNC: 7 MMOL/L (ref 4–13)
BUN SERPL-MCNC: 16 MG/DL (ref 5–25)
CALCIUM SERPL-MCNC: 7.3 MG/DL (ref 8.4–10.2)
CHLORIDE SERPL-SCNC: 112 MMOL/L (ref 96–108)
CO2 SERPL-SCNC: 22 MMOL/L (ref 21–32)
CREAT SERPL-MCNC: 0.75 MG/DL (ref 0.6–1.3)
ERYTHROCYTE [DISTWIDTH] IN BLOOD BY AUTOMATED COUNT: 14.7 % (ref 11.6–15.1)
GFR SERPL CREATININE-BSD FRML MDRD: 87 ML/MIN/1.73SQ M
GLUCOSE SERPL-MCNC: 101 MG/DL (ref 65–140)
GLUCOSE SERPL-MCNC: 103 MG/DL (ref 65–140)
GLUCOSE SERPL-MCNC: 131 MG/DL (ref 65–140)
GLUCOSE SERPL-MCNC: 133 MG/DL (ref 65–140)
GLUCOSE SERPL-MCNC: 142 MG/DL (ref 65–140)
HCT VFR BLD AUTO: 29.2 % (ref 36.5–49.3)
HGB BLD-MCNC: 9.1 G/DL (ref 12–17)
MCH RBC QN AUTO: 30.4 PG (ref 26.8–34.3)
MCHC RBC AUTO-ENTMCNC: 31.2 G/DL (ref 31.4–37.4)
MCV RBC AUTO: 98 FL (ref 82–98)
PLATELET # BLD AUTO: 169 THOUSANDS/UL (ref 149–390)
PMV BLD AUTO: 9.8 FL (ref 8.9–12.7)
POTASSIUM SERPL-SCNC: 3.9 MMOL/L (ref 3.5–5.3)
RBC # BLD AUTO: 2.99 MILLION/UL (ref 3.88–5.62)
SODIUM SERPL-SCNC: 141 MMOL/L (ref 135–147)
WBC # BLD AUTO: 8.33 THOUSAND/UL (ref 4.31–10.16)

## 2024-11-01 PROCEDURE — NC001 PR NO CHARGE: Performed by: SURGERY

## 2024-11-01 PROCEDURE — 97530 THERAPEUTIC ACTIVITIES: CPT

## 2024-11-01 PROCEDURE — 99232 SBSQ HOSP IP/OBS MODERATE 35: CPT | Performed by: NURSE PRACTITIONER

## 2024-11-01 PROCEDURE — 80048 BASIC METABOLIC PNL TOTAL CA: CPT

## 2024-11-01 PROCEDURE — NC001 PR NO CHARGE: Performed by: PODIATRIST

## 2024-11-01 PROCEDURE — A9569 TECHNETIUM TC-99M AUTO WBC: HCPCS

## 2024-11-01 PROCEDURE — 97167 OT EVAL HIGH COMPLEX 60 MIN: CPT

## 2024-11-01 PROCEDURE — 78800 RP LOCLZJ TUM 1 AREA 1 D IMG: CPT

## 2024-11-01 PROCEDURE — 85027 COMPLETE CBC AUTOMATED: CPT

## 2024-11-01 PROCEDURE — 82948 REAGENT STRIP/BLOOD GLUCOSE: CPT

## 2024-11-01 PROCEDURE — 99233 SBSQ HOSP IP/OBS HIGH 50: CPT | Performed by: PODIATRIST

## 2024-11-01 RX ORDER — VANCOMYCIN HYDROCHLORIDE 1 G/200ML
10 INJECTION, SOLUTION INTRAVENOUS EVERY 12 HOURS
Status: DISCONTINUED | OUTPATIENT
Start: 2024-11-02 | End: 2024-11-02

## 2024-11-01 RX ORDER — ACETAMINOPHEN 325 MG/1
975 TABLET ORAL EVERY 6 HOURS SCHEDULED
Status: DISCONTINUED | OUTPATIENT
Start: 2024-11-01 | End: 2024-11-03 | Stop reason: HOSPADM

## 2024-11-01 RX ORDER — VANCOMYCIN HYDROCHLORIDE 1 G/200ML
10 INJECTION, SOLUTION INTRAVENOUS EVERY 12 HOURS
Status: DISCONTINUED | OUTPATIENT
Start: 2024-11-01 | End: 2024-11-01

## 2024-11-01 RX ADMIN — ACETAMINOPHEN 975 MG: 325 TABLET ORAL at 23:03

## 2024-11-01 RX ADMIN — EZETIMIBE 10 MG: 10 TABLET ORAL at 08:33

## 2024-11-01 RX ADMIN — CEFEPIME HYDROCHLORIDE 2000 MG: 2 INJECTION, SOLUTION INTRAVENOUS at 02:01

## 2024-11-01 RX ADMIN — FUROSEMIDE 20 MG: 20 TABLET ORAL at 08:33

## 2024-11-01 RX ADMIN — OXYCODONE HYDROCHLORIDE 5 MG: 5 TABLET ORAL at 16:02

## 2024-11-01 RX ADMIN — ASPIRIN 81 MG CHEWABLE TABLET 81 MG: 81 TABLET CHEWABLE at 08:33

## 2024-11-01 RX ADMIN — POTASSIUM CHLORIDE 20 MEQ: 1500 TABLET, EXTENDED RELEASE ORAL at 08:33

## 2024-11-01 RX ADMIN — VANCOMYCIN HYDROCHLORIDE 1000 MG: 1 INJECTION, SOLUTION INTRAVENOUS at 23:03

## 2024-11-01 RX ADMIN — BISMUTH SUBSALICYLATE 524 MG: 525 LIQUID ORAL at 15:18

## 2024-11-01 RX ADMIN — PREDNISONE 5 MG: 5 TABLET ORAL at 07:42

## 2024-11-01 RX ADMIN — GABAPENTIN 600 MG: 300 CAPSULE ORAL at 08:33

## 2024-11-01 RX ADMIN — CLOPIDOGREL 75 MG: 75 TABLET ORAL at 08:33

## 2024-11-01 RX ADMIN — ATORVASTATIN CALCIUM 80 MG: 80 TABLET, FILM COATED ORAL at 16:02

## 2024-11-01 RX ADMIN — LOSARTAN POTASSIUM 12.5 MG: 25 TABLET, FILM COATED ORAL at 08:33

## 2024-11-01 RX ADMIN — OXYCODONE HYDROCHLORIDE 5 MG: 5 TABLET ORAL at 08:39

## 2024-11-01 RX ADMIN — ENOXAPARIN SODIUM 40 MG: 40 INJECTION SUBCUTANEOUS at 08:33

## 2024-11-01 RX ADMIN — METOPROLOL SUCCINATE 25 MG: 25 TABLET, EXTENDED RELEASE ORAL at 08:33

## 2024-11-01 RX ADMIN — PREDNISONE 5 MG: 5 TABLET ORAL at 16:02

## 2024-11-01 RX ADMIN — CEFEPIME HYDROCHLORIDE 2000 MG: 2 INJECTION, SOLUTION INTRAVENOUS at 17:14

## 2024-11-01 RX ADMIN — GABAPENTIN 600 MG: 300 CAPSULE ORAL at 17:55

## 2024-11-01 RX ADMIN — ACETAMINOPHEN 975 MG: 325 TABLET ORAL at 17:55

## 2024-11-01 NOTE — PROGRESS NOTES
Progress Note - Hospitalist   Name: Royce Rene 78 y.o. male I MRN: [id number]  Unit/Bed#: [address] [place] [address] Date of Admission: 10/28/2024   Date of Service: 11/1/2024 I Hospital Day: 4    Assessment & Plan  Osteomyelitis ([facility])  Patient originally presented to [facility] with worsening wound on right great toe since hitting it recently.  XR R foot suspicious for osteomyelitis of great toe  Wound culture grew pseudomonas  Currently on cefepime and vancomycin  Podiatry and vascular surgery consulted  S/p Angiogram on 10/30 with recanalization of right SFA, no distal target present for distal revascularization  Discussed with vascular; indicated patient is not a candidate for conventional recannulization; recommending following up outpatient with Dr. Harper for vein mapping and possible Limb Omar intervention  Repeat XR R foot shows no signs of osteomyelitis  Ceretec scan ordered per podiatry; results pending; discuss with podiatry   PAD (peripheral artery disease) ([facility])  History of peripheral artery disease s/p prior stenting and angioplasty.  He has had endarterectomy and profundoplasty by Dr. Cook in July 2021  Lower extremity arterial duplex demonstrating high-grade stenosis versus occlusion in the proximal mid and distal superficial femoral artery  Vascular surgery and IR consulted  S/p angiogram 10/30; occluded right SFA recanalized; right anterior tibial, posterior tibial, and peroneal arteries showed multifocal occlusions with no distal target present for revascularization  Continue aspirin, plavix, statin  Vascular input as noted above  Chronic combined systolic and diastolic CHF (congestive heart failure) ([facility])  Wt Readings from Last 3 Encounters:   11/01/24 101 kg (222 lb 7.1 oz)   10/28/24 98.9 kg (218 lb 0.6 oz)   10/09/24 100 kg (221 lb 5.5 oz)     ECHO 8/2023: EF 30-35%, systolic function severely reduced, diastolic function abnormal consistent with grade I relaxation  Continue Lasix 20 mg daily,  Toprol XL 25 mg daily  Appears euvolemic  Monitor Is/Os and daily weights  CAD (coronary artery disease)  Continue aspirin, statin, plavix, and beta-blocker therapy  Denies chest pain   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 Hernandez, [facility] oncology  Moderate vascular dementia (HCC)  Continue supportive care  Patient's son is primary caregiver  Ischemic pain of foot at rest  Patient reports intermittent foot pain   RTC extra strength tylenol  Foot elevation   Ulcer of right foot with fat layer exposed (HCC)  Wound noted; podiatry following  Follow up on ceretec scan  Vascular also following as noted above   Local wound care   Surgical shoe   Ulcer of left foot, limited to breakdown of skin (HCC)      VTE Pharmacologic Prophylaxis:   Moderate Risk (Score 3-4) - Pharmacological DVT Prophylaxis Ordered: enoxaparin (Lovenox).    Mobility:   Basic Mobility Inpatient Raw Score: 17  JH-HLM Goal: 5: Stand one or more mins  JH-HLM Achieved: 1: Laying in bed  JH-HLM Goal NOT achieved. Continue with multidisciplinary rounding and encourage appropriate mobility to improve upon JH-HLM goals.    Patient Centered Rounds: I performed bedside rounds with nursing staff today.   Discussions with Specialists or Other Care Team Provider: multidisciplinary team     Education and Discussions with Family / Patient: Updated  (son) via phone.    Current Length of Stay: 4 day(s)  Current Patient Status: Inpatient   Certification Statement: The patient will continue to require additional inpatient hospital stay due to Iv abx, wound culture pending, ceretec scan pending   Discharge Plan: Anticipate discharge in 24-48 hrs to discharge location to be determined pending rehab evaluations.    Code Status: Level 1 - Full Code    Subjective   Patient seen sitting up in bed, reports intermittent  foot pain on right. No fever or chills; good appetite, no nausea or vomiting. Denies headache, SOB or chest pain.     Objective :  Temp:  [98 °F (36.7 °C)-99.2 °F (37.3 °C)] 98.3 °F (36.8 °C)  HR:  [82-93] 93  BP: (102-112)/(49-85) 111/52  Resp:  [16-21] 16  SpO2:  [94 %-97 %] 95 %  O2 Device: None (Room air)    Body mass index is 34.84 kg/m².     Input and Output Summary (last 24 hours):     Intake/Output Summary (Last 24 hours) at 11/1/2024 1215  Last data filed at 11/1/2024 1001  Gross per 24 hour   Intake --   Output 1415 ml   Net -1415 ml       Physical Exam  Vitals and nursing note reviewed.   Constitutional:       Appearance: Normal appearance.   HENT:      Head: Normocephalic.      Nose: Nose normal.      Mouth/Throat:      Mouth: Mucous membranes are moist.   Eyes:      Extraocular Movements: Extraocular movements intact.      Conjunctiva/sclera: Conjunctivae normal.   Cardiovascular:      Rate and Rhythm: Normal rate.      Pulses: Normal pulses.      Heart sounds: Normal heart sounds.   Pulmonary:      Effort: Pulmonary effort is normal.      Breath sounds: Normal breath sounds.   Abdominal:      General: Bowel sounds are normal. There is no distension.      Palpations: Abdomen is soft.      Tenderness: There is no abdominal tenderness. There is no guarding.   Genitourinary:     Comments: Voiding spontaneously  Musculoskeletal:      Cervical back: Normal range of motion.      Right lower leg: No edema.      Left lower leg: No edema.   Skin:     Capillary Refill: Capillary refill takes less than 2 seconds.      Comments: Bilateral foot dressings present no strike through drainage noted.    Neurological:      General: No focal deficit present.      Mental Status: He is alert and oriented to person, place, and time.   Psychiatric:         Mood and Affect: Mood normal.         Behavior: Behavior normal.         Thought Content: Thought content normal.         Judgment: Judgment normal.            Lines/Drains:  Lines/Drains/Airways       Active Status       Name Placement date Placement time Site Days    Port A Cath 10/28/20 Right Chest 10/28/20  0900  Chest  1465                    Central Line:  Goal for removal:  chronic                Lab Results: I have reviewed the following results:   Results from last 7 days   Lab Units 11/01/24  0519 10/29/24  0501 10/27/24  0529 10/26/24  0453   WBC Thousand/uL 8.33   < > 9.22 7.64   HEMOGLOBIN g/dL 9.1*   < > 12.1 11.3*   HEMATOCRIT % 29.2*   < > 38.1 35.2*   PLATELETS Thousands/uL 169   < > 169 152   BANDS PCT %  --   --   --  1   SEGS PCT %  --   --  79*  --    LYMPHO PCT %  --   --  12* 12*   MONO PCT %  --   --  7 7   EOS PCT %  --   --  0 0    < > = values in this interval not displayed.     Results from last 7 days   Lab Units 11/01/24  0519 10/27/24  0529 10/26/24  0453   SODIUM mmol/L 141   < > 139   POTASSIUM mmol/L 3.9   < > 3.9   CHLORIDE mmol/L 112*   < > 108   CO2 mmol/L 22   < > 23   BUN mg/dL 16   < > 21   CREATININE mg/dL 0.75   < > 0.73   ANION GAP mmol/L 7   < > 8   CALCIUM mg/dL 7.3*   < > 8.5   ALBUMIN g/dL  --   --  3.6   TOTAL BILIRUBIN mg/dL  --   --  0.86   ALK PHOS U/L  --   --  63   ALT U/L  --   --  24   AST U/L  --   --  15   GLUCOSE RANDOM mg/dL 103   < > 86    < > = values in this interval not displayed.     Results from last 7 days   Lab Units 10/30/24  0606   INR  0.98     Results from last 7 days   Lab Units 11/01/24  1103 11/01/24  0709 10/31/24  2006 10/31/24  1615 10/31/24  1117 10/31/24  0703 10/30/24  2116 10/30/24  1732 10/30/24  1139 10/30/24  0731 10/29/24  2013 10/29/24  1609   POC GLUCOSE mg/dl 133 101 130 128 125 89 147* 92 115 101 106 140               Recent Cultures (last 7 days):         Imaging Results Review: No pertinent imaging studies reviewed.  Other Study Results Review: No additional pertinent studies reviewed.    Last 24 Hours Medication List:     Current Facility-Administered Medications:      acetaminophen (TYLENOL) tablet 975 mg, Q6H DEWAYNE    aspirin chewable tablet 81 mg, Daily    atorvastatin (LIPITOR) tablet 80 mg, Daily With Dinner    bismuth subsalicylate (PEPTO BISMOL) oral suspension 524 mg, Q6H PRN    cefepime (MAXIPIME) IVPB (premix in dextrose) 2,000 mg 50 mL, Q12H, Last Rate: 2,000 mg (11/01/24 0201)    clopidogrel (PLAVIX) tablet 75 mg, Daily    enoxaparin (LOVENOX) subcutaneous injection 40 mg, Q24H DEWAYNE    ezetimibe (ZETIA) tablet 10 mg, Daily    furosemide (LASIX) tablet 20 mg, Daily    gabapentin (NEURONTIN) capsule 600 mg, BID    insulin lispro (HumALOG/ADMELOG) 100 units/mL subcutaneous injection 1-5 Units, TID AC **AND** Fingerstick Glucose (POCT), TID AC    losartan (COZAAR) tablet 12.5 mg, Daily    meperidine (DEMEROL) injection 12.5 mg, Q10 Min PRN    methocarbamol (ROBAXIN) tablet 500 mg, Q8H PRN    metoprolol succinate (TOPROL-XL) 24 hr tablet 25 mg, Daily    ondansetron (ZOFRAN) injection 4 mg, Once PRN    oxyCODONE (ROXICODONE) IR tablet 5 mg, Q4H PRN    potassium chloride (Klor-Con M20) CR tablet 20 mEq, Daily    predniSONE tablet 5 mg, BID With Meals    vancomycin (VANCOCIN) IVPB (premix in dextrose) 1,000 mg 200 mL, Q12H, Last Rate: 1,000 mg (10/31/24 2000)    Administrative Statements   Today, Patient Was Seen By: OMAIRA Hamm  I have spent a total time of greater than 45 minutes in caring for this patient on the day of the visit/encounter including Diagnostic results, Counseling / Coordination of care, Documenting in the medical record, Reviewing / ordering tests, medicine, procedures  , and Communicating with other healthcare professionals .    **Please Note: This note may have been constructed using a voice recognition system.**

## 2024-11-01 NOTE — CONSULTS
Vascular Surgery    S/p right SFA intervention.  Severe tibial disease, does not have adequate perfusion for wound healing.  Will arrange outpatient follow up with myself to discuss potential deep vein arteriolization.

## 2024-11-01 NOTE — ASSESSMENT & PLAN NOTE
Patient with metastatic prostate cancer  Currently on chemotherapy with Jevtana every 3 weeks  Continue prednisone  Follows with Dr. Hernandez, [facility]

## 2024-11-01 NOTE — PROGRESS NOTES
Progress Note - Podiatry   Name: Royce Scott y.o. male I MRN: [id number]  Unit/Bed#: [address] [place] [address]- I Date of Admission: 10/28/2024   Date of Service: 11/1/2024 I Hospital Day: 4     Assessment & Plan  Osteomyelitis (HCC)    Chronic combined systolic and diastolic CHF (congestive heart failure) (HCC)  Wt Readings from Last 3 Encounters:   11/01/24 101 kg (222 lb 7.1 oz)   10/28/24 98.9 kg (218 lb 0.6 oz)   10/09/24 100 kg (221 lb 5.5 oz)             CAD (coronary artery disease)    PAD (peripheral artery disease) (HCC)    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)    Moderate vascular dementia (HCC)    Ischemic pain of foot at rest    Ulcer of right foot with fat layer exposed (HCC)    Ulcer of left foot, limited to breakdown of skin (HCC)      Progress Note - Podiatry  Royce Rene 78 y.o. male MRN: [id number]  Unit/Bed#: [address] [place] [address]- Encounter: [id number]    Assessment:  Rest/ischemic pain right lower extremity.  Dry gangrene distal aspect right hallux.  Blue toe syndrome.  Peripheral artery disease.  Rule out osteomyelitis.    Plan:  Chart reviewed.  Discussion with hospitalist concerning patient condition.  At this time we are awaiting workup to rule out osteomyelitis of right hallux.  Regardless of osteomyelitis workup, patient still demonstrates dry gangrene/blue toe syndrome of the right hallux.  In and of itself, this is an indication for amputation however patient has severe peripheral artery disease and most likely would fail to heal.  Therefore patient has difficult situation whereby we can try continued wound care and antibiosis and consider adding Trental to patient formulary.    In light of patient's arterial symptoms/pain.  Believe he may be candidate for arterial bypass but this decision must be made in light of patient's comorbid conditions and under guidance of vascular surgery.    At present we recommend continuation of antibiosis until discharge.  As discussed, discharge on Cipro.  Continue wound  "care.  Recommend referral to wound healing center.    Pain control with Percocet.  Recommend Trental to help with possible improvement in microvascular disease.    Patient at very high risk for limb loss.    Subjective/Objective   Chief Complaint: No chief complaint on file.      Subjective: Patient has continued pain of the right foot and leg.  He is aware that we are trying a diagnosis or exclude osteomyelitis.  However his biggest complaint is pain.  He needs Percocet to control the pain.    Blood pressure 108/60, pulse 84, temperature 99.2 °F (37.3 °C), temperature source Oral, resp. rate 16, height 5' 7\" (1.702 m), weight 101 kg (222 lb 7.1 oz), SpO2 97%.,Body mass index is 34.84 kg/m².    Invasive Devices       Central Venous Catheter Line  Duration             Port A Cath 10/28/20 Right Chest 1465 days              Peripheral Intravenous Line  Duration             Peripheral IV 10/29/24 Right;Upper;Ventral (anterior) Arm 3 days    Long-Dwell Peripheral IV (Midline) 10/31/24 Left Cephalic Vein 1 day                    Physical Exam:   General appearance: alert, cooperative and no distress  Neuro/Vascular: Patient demonstrates abnormal cooling of the right lower extremity.  Right foot demonstrates cyanosis.  Right hallux demonstrates blue toe syndrome.  There is dried eschar consistent with dry gangrene distal aspect of toe.  Lower extremity arterial Doppler demonstrates dampened/flatline vascular response at both toe and metatarsal level.  Monophasic waveform noted at ankle.      Extremity: Right hallux demonstrates dry gangrenous change/eschar on the distal medial aspect of the toe.  No evidence of occult abscess or cellulitis.  Last x-ray is negative for osteomyelitis however still awaiting Ceretec scan.              Labs and other studies:   CBC w/diff  Results from last 7 days   Lab Units 11/01/24  0519 10/29/24  0501 10/27/24  0529   WBC Thousand/uL 8.33   < > 9.22   HEMOGLOBIN g/dL 9.1*   < > 12.1 "   HEMATOCRIT % 29.2*   < > 38.1   PLATELETS Thousands/uL 169   < > 169   SEGS PCT %  --   --  79*   LYMPHO PCT %  --   --  12*   MONO PCT %  --   --  7   EOS PCT %  --   --  0    < > = values in this interval not displayed.     BMP  Results from last 7 days   Lab Units 11/01/24  0519   POTASSIUM mmol/L 3.9   CHLORIDE mmol/L 112*   CO2 mmol/L 22   BUN mg/dL 16   CREATININE mg/dL 0.75   CALCIUM mg/dL 7.3*     CMP  Results from last 7 days   Lab Units 11/01/24  0519 10/27/24  0529 10/26/24  0453   POTASSIUM mmol/L 3.9   < > 3.9   CHLORIDE mmol/L 112*   < > 108   CO2 mmol/L 22   < > 23   BUN mg/dL 16   < > 21   CREATININE mg/dL 0.75   < > 0.73   CALCIUM mg/dL 7.3*   < > 8.5   ALK PHOS U/L  --   --  63   ALT U/L  --   --  24   AST U/L  --   --  15    < > = values in this interval not displayed.       @Culture@  Lab Results   Component Value Date    BLOODCX No Growth After 5 Days. 04/03/2023    BLOODCX No Growth After 5 Days. 04/03/2023    WOUNDCULT 2+ Growth of Pseudomonas aeruginosa (A) 10/23/2024    WOUNDCULT 1+ Growth of 10/23/2024         Current Facility-Administered Medications:     acetaminophen (TYLENOL) tablet 975 mg, 975 mg, Oral, Q6H [facility], OMAIRA Hamm, 975 mg at 11/01/24 1755    aspirin chewable tablet 81 mg, 81 mg, Oral, Daily, Vini Cano DO, 81 mg at 11/01/24 0833    atorvastatin (LIPITOR) tablet 80 mg, 80 mg, Oral, Daily With Dinner, Vini Cano DO, 80 mg at 11/01/24 1602    bismuth subsalicylate (PEPTO BISMOL) oral suspension 524 mg, 524 mg, Oral, Q6H PRN, Vini Cano DO, 524 mg at 11/01/24 1518    cefepime (MAXIPIME) IVPB (premix in dextrose) 2,000 mg 50 mL, 2,000 mg, Intravenous, Q12H, Vini Cano DO, Last Rate: 100 mL/hr at 11/01/24 1714, 2,000 mg at 11/01/24 1714    clopidogrel (PLAVIX) tablet 75 mg, 75 mg, Oral, Daily, Vini Cano DO, 75 mg at 11/01/24 0833    enoxaparin (LOVENOX) subcutaneous injection 40 mg, 40 mg, Subcutaneous, Q24H [facility], Vini Santos  DO Rachael, 40 mg at 11/01/24 0833    ezetimibe (ZETIA) tablet 10 mg, 10 mg, Oral, Daily, Vini Cano DO, 10 mg at 11/01/24 0833    furosemide (LASIX) tablet 20 mg, 20 mg, Oral, Daily, Vini Cano DO, 20 mg at 11/01/24 0833    gabapentin (NEURONTIN) capsule 600 mg, 600 mg, Oral, BID, Vini Cano DO, 600 mg at 11/01/24 1755    insulin lispro (HumALOG/ADMELOG) 100 units/mL subcutaneous injection 1-5 Units, 1-5 Units, Subcutaneous, TID AC, 1 Units at 10/28/24 1906 **AND** Fingerstick Glucose (POCT), , , TID AC, Vini Cano DO    losartan (COZAAR) tablet 12.5 mg, 12.5 mg, Oral, Daily, Vini Cano DO, 12.5 mg at 11/01/24 0833    meperidine (DEMEROL) injection 12.5 mg, 12.5 mg, Intravenous, Q10 Min PRN, Antonio Olmos CRNA    methocarbamol (ROBAXIN) tablet 500 mg, 500 mg, Oral, Q8H PRN, Vini Cano DO, 500 mg at 10/29/24 1812    metoprolol succinate (TOPROL-XL) 24 hr tablet 25 mg, 25 mg, Oral, Daily, Vini Cano DO, 25 mg at 11/01/24 0833    ondansetron (ZOFRAN) injection 4 mg, 4 mg, Intravenous, Once PRN, Antonio Olmos CRNA    oxyCODONE (ROXICODONE) IR tablet 5 mg, 5 mg, Oral, Q4H PRN, Vini Cano DO, 5 mg at 11/01/24 1602    potassium chloride (Klor-Con M20) CR tablet 20 mEq, 20 mEq, Oral, Daily, Vini Cano DO, 20 mEq at 11/01/24 0833    predniSONE tablet 5 mg, 5 mg, Oral, BID With Meals, Vini Cano DO, 5 mg at 11/01/24 1602    [START ON 11/2/2024] vancomycin (VANCOCIN) IVPB (premix in dextrose) 1,000 mg 200 mL, 10 mg/kg, Intravenous, Q12H, OMAIRA Hamm    Imaging: I have personally reviewed pertinent films in PACS  EKG, Pathology, and Other Studies: I have personally reviewed pertinent reports.  VTE Pharmacologic Prophylaxis: VTE covered by:  enoxaparin, Subcutaneous, 40 mg at 11/01/24 0833     VTE Mechanical Prophylaxis: sequential compression device.         Counseling and Coordination of care  I spent 35 minutes face-to-face with patient  today. More than 50% was spent in counseling & coordination of care. Counseled patient regarding nature of rest pain in the foot and leg secondary to peripheral artery disease.  Nature of gangrene.  Possible treatment outcomes/possibilities.  Possibility of need for higher level amputation.  Need for follow-up with vascular surgery and in wound healing center..       Zachery Montes DPM

## 2024-11-01 NOTE — PLAN OF CARE
Problem: PAIN - ADULT  Goal: Verbalizes/displays adequate comfort level or baseline comfort level  Description: Interventions:  - Encourage patient to monitor pain and request assistance  - Assess pain using appropriate pain scale  - Administer analgesics based on type and severity of pain and evaluate response  - Implement non-pharmacological measures as appropriate and evaluate response  - Consider cultural and social influences on pain and pain management  - Notify physician/advanced practitioner if interventions unsuccessful or patient reports new pain  Outcome: Progressing     Problem: INFECTION - ADULT  Goal: Absence or prevention of progression during hospitalization  Description: INTERVENTIONS:  - Assess and monitor for signs and symptoms of infection  - Monitor lab/diagnostic results  - Monitor all insertion sites, i.e. indwelling lines, tubes, and drains  - Monitor endotracheal if appropriate and nasal secretions for changes in amount and color  - [place] appropriate cooling/warming therapies per order  - Administer medications as ordered  - Instruct and encourage patient and family to use good hand hygiene technique  - Identify and instruct in appropriate isolation precautions for identified infection/condition  Outcome: Progressing     Problem: SAFETY ADULT  Goal: Patient will remain free of falls  Description: INTERVENTIONS:  - Educate patient/family on patient safety including physical limitations  - Instruct patient to call for assistance with activity   - Consult OT/PT to assist with strengthening/mobility   - Keep Call bell within reach  - Keep bed low and locked with side rails adjusted as appropriate  - Keep care items and personal belongings within reach  - Initiate and maintain comfort rounds  - Make Fall Risk Sign visible to staff  - Offer Toileting every 2 Hours, in advance of need  - Initiate/Maintain bed alarm  - Obtain necessary fall risk management equipment: yes  - Apply yellow socks  and bracelet for high fall risk patients  - Consider moving patient to room near nurses station  Outcome: Progressing  Goal: Maintain or return to baseline ADL function  Description: INTERVENTIONS:  -  Assess patient's ability to carry out ADLs; assess patient's baseline for ADL function and identify physical deficits which impact ability to perform ADLs (bathing, care of mouth/teeth, toileting, grooming, dressing, etc.)  - Assess/evaluate cause of self-care deficits   - Assess range of motion  - Assess patient's mobility; develop plan if impaired  - Assess patient's need for assistive devices and provide as appropriate  - Encourage maximum independence but intervene and supervise when necessary  - Involve family in performance of ADLs  - Assess for home care needs following discharge   - Consider OT consult to assist with ADL evaluation and planning for discharge  - Provide patient education as appropriate  Outcome: Progressing  Goal: Maintains/Returns to pre admission functional level  Description: INTERVENTIONS:  - Perform AM-PAC 6 Click Basic Mobility/ Daily Activity assessment daily.  - Set and communicate daily mobility goal to care team and patient/family/caregiver.   - Collaborate with rehabilitation services on mobility goals if consulted  - Perform Range of Motion 3 times a day.  - Reposition patient every 2 hours.  - Dangle patient 3 times a day  - Stand patient 3 times a day  - Ambulate patient 3 times a day  - Out of bed to chair 3 times a day   - Out of bed for meals 3 times a day  - Out of bed for toileting  - Record patient progress and toleration of activity level   Outcome: Progressing     Problem: DISCHARGE PLANNING  Goal: Discharge to home or other facility with appropriate resources  Description: INTERVENTIONS:  - Identify barriers to discharge w/patient and caregiver  - Arrange for needed discharge resources and transportation as appropriate  - Identify discharge learning needs (meds, wound  care, etc.)  - Arrange for interpretive services to assist at discharge as needed  - Refer to Case Management Department for coordinating discharge planning if the patient needs post-hospital services based on physician/advanced practitioner order or complex needs related to functional status, cognitive ability, or social support system  Outcome: Progressing     Problem: Knowledge Deficit  Goal: Patient/family/caregiver demonstrates understanding of disease process, treatment plan, medications, and discharge instructions  Description: Complete learning assessment and assess knowledge base.  Interventions:  - Provide teaching at level of understanding  - Provide teaching via preferred learning methods  Outcome: Progressing     Problem: NEUROSENSORY - ADULT  Goal: Achieves stable or improved neurological status  Description: INTERVENTIONS  - Monitor and report changes in neurological status  - Monitor vital signs such as temperature, blood pressure, glucose, and any other labs ordered   - Initiate measures to prevent increased intracranial pressure  - Monitor for seizure activity and implement precautions if appropriate      Outcome: Progressing  Goal: Achieves maximal functionality and self care  Description: INTERVENTIONS  - Monitor swallowing and airway patency with patient fatigue and changes in neurological status  - Encourage and assist patient to increase activity and self care.   - Encourage visually impaired, hearing impaired and aphasic patients to use assistive/communication devices  Outcome: Progressing     Problem: METABOLIC, FLUID AND ELECTROLYTES - ADULT  Goal: Electrolytes maintained within normal limits  Description: INTERVENTIONS:  - Monitor labs and assess patient for signs and symptoms of electrolyte imbalances  - Administer electrolyte replacement as ordered  - Monitor response to electrolyte replacements, including repeat lab results as appropriate  - Instruct patient on fluid and nutrition as  appropriate  Outcome: Progressing  Goal: Fluid balance maintained  Description: INTERVENTIONS:  - Monitor labs   - Monitor I/O and WT  - Instruct patient on fluid and nutrition as appropriate  - Assess for signs & symptoms of volume excess or deficit  Outcome: Progressing  Goal: Glucose maintained within target range  Description: INTERVENTIONS:  - Monitor Blood Glucose as ordered  - Assess for signs and symptoms of hyperglycemia and hypoglycemia  - Administer ordered medications to maintain glucose within target range  - Assess nutritional intake and initiate nutrition service referral as needed  Outcome: Progressing     Problem: SKIN/TISSUE INTEGRITY - ADULT  Goal: Skin Integrity remains intact(Skin Breakdown Prevention)  Description: Assess:  -Perform Harshal assessment every shift  -Clean and moisturize skin every shift   -Inspect skin when repositioning, toileting, and assisting with ADLS  -Assess under medical devices such as masimo every shift   -Assess extremities for adequate circulation and sensation     Bed Management:  -Have minimal linens on bed & keep smooth, unwrinkled  -Change linens as needed when moist or perspiring  -Avoid sitting or lying in one position for more than 2 hours while in bed  -Keep HOB at 30 degrees     Toileting:  -Offer bedside commode  -Assess for incontinence every shift   -Use incontinent care products after each incontinent episode such as remedy    Activity:  -Mobilize patient 3 times a day  -Encourage activity and walks on unit  -Encourage or provide ROM exercises   -Turn and reposition patient every 2 Hours  -Use appropriate equipment to lift or move patient in bed  -Instruct/ Assist with weight shifting every 30 minutes  when out of bed in chair  -Consider limitation of chair time 2 hour intervals    Skin Care:  -Avoid use of baby powder, tape, friction and shearing, hot water or constrictive clothing  -Relieve pressure over bony prominences using wedges and pillows   -Do  not massage red bony areas    Next Steps:  -Teach patient strategies to minimize risks such as weight shifting    -Consider consults to  interdisciplinary teams   Outcome: Progressing  Goal: Incision(s), wounds(s) or drain site(s) healing without S/S of infection  Description: INTERVENTIONS  - Assess and document dressing, incision, wound bed, drain sites and surrounding tissue  - Provide patient and family education  - Perform skin care/dressing changes every 2 hours  Outcome: Progressing  Goal: Pressure injury heals and does not worsen  Description: Interventions:  - Implement low air loss mattress or specialty surface (Criteria met)  - Apply silicone foam dressing  - Instruct/assist with weight shifting every 120 minutes when in chair   - Limit chair time to 2 hour intervals  - Use special pressure reducing interventions such as waffle cushions when in chair   - Apply fecal or urinary incontinence containment device   - Perform passive or active ROM every 2 hours  - Turn and reposition patient & offload bony prominences every 2 hours   - Utilize friction reducing device or surface for transfers   - Consider nutrition services referral as needed  Outcome: Progressing     Problem: MUSCULOSKELETAL - ADULT  Goal: Maintain or return mobility to safest level of function  Description: INTERVENTIONS:  - Assess patient's ability to carry out ADLs; assess patient's baseline for ADL function and identify physical deficits which impact ability to perform ADLs (bathing, care of mouth/teeth, toileting, grooming, dressing, etc.)  - Assess/evaluate cause of self-care deficits   - Assess range of motion  - Assess patient's mobility  - Assess patient's need for assistive devices and provide as appropriate  - Encourage maximum independence but intervene and supervise when necessary  - Involve family in performance of ADLs  - Assess for home care needs following discharge   - Consider OT consult to assist with ADL evaluation and  planning for discharge  - Provide patient education as appropriate  Outcome: Progressing  Goal: Maintain proper alignment of affected body part  Description: INTERVENTIONS:  - Support, maintain and protect limb and body alignment  - Provide patient/ family with appropriate education  Outcome: Progressing

## 2024-11-01 NOTE — OCCUPATIONAL THERAPY NOTE
Occupational Therapy Evaluation       11/01/24 3900   OT Last Visit   OT Visit Date 11/01/24   Note Type   Note type Evaluation   Pain Assessment   Pain Assessment Tool 0-10   Pain Score 7   Pain Location/Orientation Orientation: Right;Location: Foot   Restrictions/Precautions   RLE Weight Bearing Per Order WBAT   Braces or Orthoses Other (Comment)  (Surgical shoe)   Home Living   Type of Home House;Other (Comment)  (Lives 2nd floor of home)   Home Layout Able to live on main level with bedroom/bathroom  (Stair glide to second floor)   Bathroom Shower/Tub Tub/shower unit   Bathroom Toilet Raised   Bathroom Equipment Grab bars in shower;Shower chair;Toilet raiser   Home Equipment Walker;Sock aid;Other (Comment)  (Rollator, electric scooter)   Additional Comments Pt reports living alone in the 2nd floor of a home; son lives in the same home but a separate apartment; Pt has a stair glide to the second floor; Reports using his electric scooter mostly in the home, does not fit in bathroom so he drives it to the doorway then walks into bathroom , holds onto furniture to get to toilet or shower   Prior Function   Level of [place] Independent with ADLs;Independent with functional mobility;Needs assistance with IADLS   Lives With Son   Receives Help From Family   IADLs Family/Friend/Other provides transportation;Family/Friend/Other provides meals;Family/Friend/Other provides medication management  (Pt gets Meals on Wheels for 2 meals per day, son assists with transportation or grocery shopping)   Falls in the last 6 months 1 to 4   General   Additional Pertinent History Patient presented to hospital with 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; [facility] [place] [facility] and possible podiatry intervention   Family/Caregiver Present Yes   ADL   Eating Assistance 7  Independent   Grooming Assistance 5  Supervision/Setup   Grooming Deficit Wash/dry hands;Brushing hair   UB Bathing Assistance 5  Supervision/Setup   LB  Bathing Assistance 4  Minimal Assistance   UB Dressing Assistance 5  Supervision/Setup   LB Dressing Assistance 3  Moderate Assistance   LB Dressing Deficit Don/doff L sock;Don/doff R sock  (Limited by LE pain)   Toileting Assistance  4  Minimal Assistance   Toileting Deficit Perineal hygiene;Clothing management up;Clothing management down   Bed Mobility   Supine to Sit 5  Supervision   Sit to Supine 5  Supervision   Transfers   Sit to Stand 4  Minimal assistance   Additional items Assist x 1;Verbal cues   Stand to Sit 4  Minimal assistance   Additional items Assist x 1;Verbal cues   Stand pivot 4  Minimal assistance   Additional items Assist x 1;Verbal cues  (With RW)   Toilet transfer 4  Minimal assistance   Additional items Assist x 1;Verbal cues;Commode  (Stand pivot transfer with RW)   Functional Mobility   Additional Comments Patient declined ambulation due to foot pain; agreeable to STS and stand pivot transfers only this session; will continue to assess   Balance   Static Sitting Fair +   Dynamic Sitting Fair   Static Standing Fair   Dynamic Standing Fair   Activity Tolerance   Activity Tolerance Patient limited by pain;Patient limited by fatigue   RUE Assessment   RUE Assessment WFL   LUE Assessment   LUE Assessment WFL   Cognition   Overall Cognitive Status WFL  (For participation in OT eval)   Arousal/Participation Alert;Cooperative   Attention Attends with cues to redirect   Orientation Level Oriented X4   Following Commands Follows multistep commands with increased time or repetition   Comments Patient awake and alert throughout session; some higher level cognitive functional limitations such as poor problem solving and poor multistep command follow, questionable insight into current functional limitations. Per chart review, ACLS completed in January of 2024 with a score of 4.4 : indicating daily checks ins are beneficial however patient may be alone for parts of the day. Short Blessed Test completed,  and patient scored an 8: indicating Questionable Impairment per scoring guidelines. Will continue to assess   Assessment   Limitation Decreased ADL status;Decreased UE strength;Decreased Safe judgement during ADL;Decreased cognition;Decreased endurance;Decreased self-care trans;Decreased high-level ADLs   Prognosis Good   Assessment Patient evaluated by Occupational Therapy.  Patient admitted with Osteomyelitis (HCC).  The patients occupational profile, medical and therapy history includes a extensive additional review of physical, cognitive, or psychosocial history related to current functional performance.  Comorbidities affecting functional mobility and ADLS include: high cholesterol, prostate cancer, CAD, heart failure.  Prior to admission, patient was independent with functional mobility with walker or electric scooter, independent with ADLS, and requiring assist for IADLS.  The evaluation identifies the following performance deficits: weakness, impaired balance, decreased endurance, increased fall risk, new onset of impairment of functional mobility, decreased ADLS, decreased IADLS, decreased activity tolerance, decreased safety awareness, impaired judgement, decreased cognition, decreased strength, and impaired psychosocial skills, that result in activity limitations and/or participation restrictions. This evaluation requires clinical decision making of high complexity, because the patient presents with comorbidites that affect occupational performance and required significant modification of tasks or assistance with consideration of multiple treatment options.  The Barthel Index was used as a functional outcome tool presenting with a score of Barthel Index Score: 50, indicating marked limitations of functional mobility and ADLS.  The patient's raw score on the -PAC Daily Activity Inpatient Short Form is 19. A raw score of greater than or equal to 19 suggests the patient may benefit from discharge to home.  Please refer to the recommendation of the Occupational Therapist for safe discharge planning. Patient will benefit from skilled Occupational Therapy services to address above deficits and facilitate a safe return to prior level of function.   Goals   Patient Goals To get home as soon as I can   STG Time Frame   (1-7 days)   Short Term Goal  Goals established to promote Patient Goals: To get home as soon as I can: Grooming: independent standing at sink; Bathing: supervision; Upper Body Dressing independent; Lower Body Dressing: min assist; Toileting: supervision; Patient will increase ambulatory standard toilet transfer to supervision with rolling walker to increase performance and safety with ADLS and functional mobility; Patient will increase standing tolerance to 5 minutes during ADL task to decrease assistance level and decrease fall risk; Patient will increase bed mobility to independent in preparation for ADLS and transfers; Patient will increase functional mobility to and from bathroom with rolling walker with supervision to increase performance with ADLS and to use a toilet; Patient will tolerate 10 minutes of UE ROM/strengthening to increase general activity tolerance and performance in ADLS/IADLS; Patient will improve functional activity tolerance to 10 minutes of sustained functional tasks to increase participation in basic self-care and decrease assistance level;  Patient will be able to to verbalize understanding and perform energy conservation/proper body mechanics during ADLS and functional mobility at least 75% of the time with minimal cueing to decrease signs of fatigue and increase stamina to return to prior level of function; Patient will increase dynamic sitting balance to fair+ to improve the ability to sit at edge of bed or on a chair for ADLS;  Patient will increase static/dynamic standing balance to fair+ to improve postural stability and decrease fall risk during standing ADLS and transfers.   Yes Patient will participate in ongoing cognitive assessment to evaluate impact on functional performance as indicated.   LTG Time Frame   (8-14 days)   Long Term Goal Bathing: independent; Upper Body Dressing independent; Lower Body Dressing: independent; Toileting: independent; Patient will increase ambulatory standard toilet transfer to independent with rolling walker to increase performance and safety with ADLS and functional mobility; Patient will increase standing tolerance to 15 minutes during ADL task to decrease assistance level and decrease fall risk; Patient will increase bed mobility to independent in preparation for ADLS and transfers; Patient will increase functional mobility to and from bathroom with rolling walker independently to increase performance with ADLS and to use a toilet; Patient will tolerate 15 minutes of UE ROM/strengthening to increase general activity tolerance and performance in ADLS/IADLS; Patient will improve functional activity tolerance to 15 minutes of sustained functional tasks to increase participation in basic self-care and decrease assistance level;  Patient will be able to to verbalize understanding and perform energy conservation/proper body mechanics during ADLS and functional mobility at least 90% of the time with no cueing to decrease signs of fatigue and increase stamina to return to prior level of function; Patient will increase static/dynamic sitting balance to good to improve the ability to sit at edge of bed or on a chair for ADLS;  Patient will increase static/dynamic standing balance to good to improve postural stability and decrease fall risk during standing ADLS and transfers.  Pt will score >/= 24/24 on AM-PAC Daily Activity Inpatient scale to promote safe independence with ADLs and functional mobility; Pt will score >/= 100/100 on Barthel Index in order to decrease caregiver assistance needed and increase ability to perform ADLs and functional mobility.   Plan    Treatment Interventions ADL retraining;Functional transfer training;UE strengthening/ROM;Endurance training;Patient/family training;Equipment evaluation/education;Compensatory technique education;Continued evaluation;Energy conservation;Activityengagement   Goal Expiration Date 11/15/24   OT Frequency 3-5x/wk   Discharge Recommendation   Rehab Resource Intensity Level, OT III (Minimum Resource Intensity)   AM-PAC Daily Activity Inpatient   Lower Body Dressing 2   Bathing 3   Toileting 3   Upper Body Dressing 3   Grooming 4   Eating 4   Daily Activity Raw Score 19   Daily Activity Standardized Score (Calc for Raw Score >=11) 40.22   AM-PAC Applied Cognition Inpatient   Following a Speech/Presentation 3   Understanding Ordinary Conversation 4   Taking Medications 3   Remembering Where Things Are Placed or Put Away 4   Remembering List of 4-5 Errands 3   Taking Care of Complicated Tasks 3   Applied Cognition Raw Score 20   Applied Cognition Standardized Score 41.76   Barthel Index   Feeding 10   Bathing 0   Grooming Score 0   Dressing Score 5   Bladder Score 10   Bowels Score 10   Toilet Use Score 5   Transfers (Bed/Chair) Score 10   Mobility (Level Surface) Score 0   Stairs Score 0   Barthel Index Score 50   Licensure   [place] License Number  Lisa Ramos, OTR/L [id number]

## 2024-11-01 NOTE — ASSESSMENT & PLAN NOTE
Wt Readings from Last 3 Encounters:   11/01/24 101 kg (222 lb 7.1 oz)   10/28/24 98.9 kg (218 lb 0.6 oz)   10/09/24 100 kg (221 lb 5.5 oz)

## 2024-11-01 NOTE — ASSESSMENT & PLAN NOTE
History of peripheral artery disease s/p prior stenting and angioplasty.  He has had endarterectomy and profundoplasty by Dr. Cook in July 2021  Lower extremity arterial duplex demonstrating high-grade stenosis versus occlusion in the proximal mid and distal superficial femoral artery  Vascular surgery and IR consulted  S/p angiogram 10/30; occluded right SFA recanalized; right anterior tibial, posterior tibial, and peroneal arteries showed multifocal occlusions with no distal target present for revascularization  Continue aspirin, plavix, statin  Vascular input as noted above

## 2024-11-01 NOTE — PLAN OF CARE
Problem: PAIN - ADULT  Goal: Verbalizes/displays adequate comfort level or baseline comfort level  Description: Interventions:  - Encourage patient to monitor pain and request assistance  - Assess pain using appropriate pain scale  - Administer analgesics based on type and severity of pain and evaluate response  - Implement non-pharmacological measures as appropriate and evaluate response  - Consider cultural and social influences on pain and pain management  - Notify physician/advanced practitioner if interventions unsuccessful or patient reports new pain  Outcome: Progressing     Problem: INFECTION - ADULT  Goal: Absence or prevention of progression during hospitalization  Description: INTERVENTIONS:  - Assess and monitor for signs and symptoms of infection  - Monitor lab/diagnostic results  - Monitor all insertion sites, i.e. indwelling lines, tubes, and drains  - Monitor endotracheal if appropriate and nasal secretions for changes in amount and color  - [place] appropriate cooling/warming therapies per order  - Administer medications as ordered  - Instruct and encourage patient and family to use good hand hygiene technique  - Identify and instruct in appropriate isolation precautions for identified infection/condition  Outcome: Progressing     Problem: SAFETY ADULT  Goal: Patient will remain free of falls  Description: INTERVENTIONS:  - Educate patient/family on patient safety including physical limitations  - Instruct patient to call for assistance with activity   - Consult OT/PT to assist with strengthening/mobility   - Keep Call bell within reach  - Keep bed low and locked with side rails adjusted as appropriate  - Keep care items and personal belongings within reach  - Initiate and maintain comfort rounds  - Make Fall Risk Sign visible to staff  - Offer Toileting every 2 Hours, in advance of need  - Initiate/Maintain bed alarm  - Obtain necessary fall risk management equipment: yes  - Apply yellow socks  and bracelet for high fall risk patients  - Consider moving patient to room near nurses station  Outcome: Progressing  Goal: Maintain or return to baseline ADL function  Description: INTERVENTIONS:  -  Assess patient's ability to carry out ADLs; assess patient's baseline for ADL function and identify physical deficits which impact ability to perform ADLs (bathing, care of mouth/teeth, toileting, grooming, dressing, etc.)  - Assess/evaluate cause of self-care deficits   - Assess range of motion  - Assess patient's mobility; develop plan if impaired  - Assess patient's need for assistive devices and provide as appropriate  - Encourage maximum independence but intervene and supervise when necessary  - Involve family in performance of ADLs  - Assess for home care needs following discharge   - Consider OT consult to assist with ADL evaluation and planning for discharge  - Provide patient education as appropriate  Outcome: Progressing  Goal: Maintains/Returns to pre admission functional level  Description: INTERVENTIONS:  - Perform AM-PAC 6 Click Basic Mobility/ Daily Activity assessment daily.  - Set and communicate daily mobility goal to care team and patient/family/caregiver.   - Collaborate with rehabilitation services on mobility goals if consulted  - Perform Range of Motion 3 times a day.  - Reposition patient every 2 hours.  - Dangle patient 3 times a day  - Stand patient 3 times a day  - Ambulate patient 3 times a day  - Out of bed to chair 3 times a day   - Out of bed for meals 3 times a day  - Out of bed for toileting  - Record patient progress and toleration of activity level   Outcome: Progressing     Problem: DISCHARGE PLANNING  Goal: Discharge to home or other facility with appropriate resources  Description: INTERVENTIONS:  - Identify barriers to discharge w/patient and caregiver  - Arrange for needed discharge resources and transportation as appropriate  - Identify discharge learning needs (meds, wound  care, etc.)  - Arrange for interpretive services to assist at discharge as needed  - Refer to Case Management Department for coordinating discharge planning if the patient needs post-hospital services based on physician/advanced practitioner order or complex needs related to functional status, cognitive ability, or social support system  Outcome: Progressing     Problem: NEUROSENSORY - ADULT  Goal: Achieves stable or improved neurological status  Description: INTERVENTIONS  - Monitor and report changes in neurological status  - Monitor vital signs such as temperature, blood pressure, glucose, and any other labs ordered   - Initiate measures to prevent increased intracranial pressure  - Monitor for seizure activity and implement precautions if appropriate      Outcome: Progressing  Goal: Achieves maximal functionality and self care  Description: INTERVENTIONS  - Monitor swallowing and airway patency with patient fatigue and changes in neurological status  - Encourage and assist patient to increase activity and self care.   - Encourage visually impaired, hearing impaired and aphasic patients to use assistive/communication devices  Outcome: Progressing     Problem: SKIN/TISSUE INTEGRITY - ADULT  Goal: Skin Integrity remains intact(Skin Breakdown Prevention)  Description: Assess:  -Perform Harshal assessment every shift  -Clean and moisturize skin every shift   -Inspect skin when repositioning, toileting, and assisting with ADLS  -Assess under medical devices such as masimo every shift   -Assess extremities for adequate circulation and sensation     Bed Management:  -Have minimal linens on bed & keep smooth, unwrinkled  -Change linens as needed when moist or perspiring  -Avoid sitting or lying in one position for more than 2 hours while in bed  -Keep HOB at 30 degrees     Toileting:  -Offer bedside commode  -Assess for incontinence every shift   -Use incontinent care products after each incontinent episode such as  remedy    Activity:  -Mobilize patient 3 times a day  -Encourage activity and walks on unit  -Encourage or provide ROM exercises   -Turn and reposition patient every 2 Hours  -Use appropriate equipment to lift or move patient in bed  -Instruct/ Assist with weight shifting every 30 minutes  when out of bed in chair  -Consider limitation of chair time 2 hour intervals    Skin Care:  -Avoid use of baby powder, tape, friction and shearing, hot water or constrictive clothing  -Relieve pressure over bony prominences using wedges and pillows   -Do not massage red bony areas    Next Steps:  -Teach patient strategies to minimize risks such as weight shifting    -Consider consults to  interdisciplinary teams   Outcome: Progressing  Goal: Incision(s), wounds(s) or drain site(s) healing without S/S of infection  Description: INTERVENTIONS  - Assess and document dressing, incision, wound bed, drain sites and surrounding tissue  - Provide patient and family education  - Perform skin care/dressing changes every 2 hours  Outcome: Progressing  Goal: Pressure injury heals and does not worsen  Description: Interventions:  - Implement low air loss mattress or specialty surface (Criteria met)  - Apply silicone foam dressing  - Instruct/assist with weight shifting every 120 minutes when in chair   - Limit chair time to 2 hour intervals  - Use special pressure reducing interventions such as waffle cushions when in chair   - Apply fecal or urinary incontinence containment device   - Perform passive or active ROM every 2 hours  - Turn and reposition patient & offload bony prominences every 2 hours   - Utilize friction reducing device or surface for transfers   - Consider nutrition services referral as needed  Outcome: Progressing     Problem: MUSCULOSKELETAL - ADULT  Goal: Maintain or return mobility to safest level of function  Description: INTERVENTIONS:  - Assess patient's ability to carry out ADLs; assess patient's baseline for ADL  function and identify physical deficits which impact ability to perform ADLs (bathing, care of mouth/teeth, toileting, grooming, dressing, etc.)  - Assess/evaluate cause of self-care deficits   - Assess range of motion  - Assess patient's mobility  - Assess patient's need for assistive devices and provide as appropriate  - Encourage maximum independence but intervene and supervise when necessary  - Involve family in performance of ADLs  - Assess for home care needs following discharge   - Consider OT consult to assist with ADL evaluation and planning for discharge  - Provide patient education as appropriate  Outcome: Progressing  Goal: Maintain proper alignment of affected body part  Description: INTERVENTIONS:  - Support, maintain and protect limb and body alignment  - Provide patient/ family with appropriate education  Outcome: Progressing

## 2024-11-01 NOTE — ASSESSMENT & PLAN NOTE
Patient originally presented to [facility] with worsening wound on right great toe since hitting it recently.  XR R foot suspicious for osteomyelitis of great toe  Wound culture grew pseudomonas  Currently on cefepime and vancomycin  Podiatry and vascular surgery consulted  S/p Angiogram on 10/30 with recanalization of right SFA, no distal target present for distal revascularization  Discussed with vascular; indicated patient is not a candidate for conventional recannulization; recommending following up outpatient with Dr. aHrper for vein mapping and possible Limb Omar intervention  Repeat XR R foot shows no signs of osteomyelitis  Ceretec scan ordered per podiatry; results pending; discuss with podiatry

## 2024-11-01 NOTE — ASSESSMENT & PLAN NOTE
Wound noted; podiatry following  Follow up on ceretec scan  Vascular also following as noted above   Local wound care   Surgical shoe

## 2024-11-01 NOTE — ASSESSMENT & PLAN NOTE
Wt Readings from Last 3 Encounters:   11/01/24 101 kg (222 lb 7.1 oz)   10/28/24 98.9 kg (218 lb 0.6 oz)   10/09/24 100 kg (221 lb 5.5 oz)     ECHO 8/2023: EF 30-35%, systolic function severely reduced, diastolic function abnormal consistent with grade I relaxation  Continue Lasix 20 mg daily, Toprol XL 25 mg daily  Appears euvolemic  Monitor Is/Os and daily weights

## 2024-11-01 NOTE — PHYSICAL THERAPY NOTE
PT TREATMENT     11/01/24 1420   PT Last Visit   PT Visit Date 11/01/24   Note Type   Note Type Treatment   Pain Assessment   Pain Assessment Tool Ozuna-Baker FACES   Ozuna-Baker FACES Pain Rating 4   Pain Location/Orientation Orientation: Right;Location: Foot   Restrictions/Precautions   RLE Weight Bearing Per Order WBAT   Braces or Orthoses   (B surgical shoes)   Other Precautions Chair Alarm;Bed Alarm;Fall Risk;Pain   General   Chart Reviewed Yes   Cognition   Overall Cognitive Status WFL   Arousal/Participation Alert   Attention Attends with cues to redirect   Comments Pt can be impulsive   Bed Mobility   Supine to Sit 5  Supervision   Sit to Supine 5  Supervision   Transfers   Sit to Stand 4  Minimal assistance   Stand to Sit 4  Minimal assistance   Stand pivot 4  Minimal assistance  (with walker)   Toilet transfer 5  Supervision   Additional items Commode  (grab bar)   Balance   Static Sitting Fair +   Static Standing Fair   Ambulatory Fair -   Activity Tolerance   Activity Tolerance Patient limited by pain;Patient limited by fatigue   Assessment   Prognosis Good   Problem List Decreased strength;Decreased endurance;Impaired balance;Decreased mobility;Pain;Decreased skin integrity   Assessment Pt is able to ambulate to and from the bathroom with supervision assist with a walker and verbal cues for safety and upright posture.  Pt declined additional activity.   Frequency of PT decreased to 3-5 x per week as pt has been consistently walking to the bathroom multiple times per day with supervision.  Await results of ceretek scan /medical work up for possible intervention to L foot wound.    The patient's AM-PAC Basic Mobility Inpatient Short Form Raw Score is 17. A Raw score of greater than 16 suggests the patient may benefit from discharge to home. Please also refer to the recommendation of the Physical Therapist for safe discharge planning.         Plan   Treatment/Interventions Functional transfer training;LE  strengthening/ROM;Gait training;Bed mobility;Equipment eval/education;Patient/family training;Therapeutic exercise   Progress Progressing toward goals   PT Frequency 3-5x/wk   Discharge Recommendation   Rehab Resource Intensity Level, PT III (Minimum Resource Intensity)   AM-PAC Basic Mobility Inpatient   Turning in Flat Bed Without Bedrails 3   Lying on Back to Sitting on Edge of Flat Bed Without Bedrails 3   Moving Bed to Chair 3   Standing Up From Chair Using Arms 3   Walk in Room 3   Climb 3-5 Stairs With Railing 2   Basic Mobility Inpatient Raw Score 17   Basic Mobility Standardized Score 39.67   [facility] Highest Level Of Mobility   -HL Goal 5: Stand one or more mins   -HLM Achieved 7: Walk 25 feet or more   End of Consult   Patient Position at End of Consult All needs within reach;Supine   Licensure   [place] License Number  Keyonna Guan PT [id number]

## 2024-11-01 NOTE — PROGRESS NOTES
Royce Rene is a 78 y.o. male who is currently ordered Vancomycin IV with management by the Pharmacy Consult service.  Relevant clinical data and objective / subjective history reviewed.  Vancomycin Assessment:  Indication and Goal AUC/Trough: Soft tissue (goal -600, trough >10), -600, trough >10  Clinical Status: stable  Renal Function:  SCr: 0.75 mg/dL  CrCl: 90.6 mL/min  Renal replacement: Not on dialysis  Days of Therapy: 11  Current Dose: 1000mg IV q12h  Vancomycin Plan:  Continue 1000mg IV q12h. Admin time has changed based on AUC/trough levels.   Estimated AUC: 527 mcg*hr/mL  Estimated Trough: 16.2 mcg/mL  Next Level: 11/6/24 @0600  Renal Function Monitoring: Daily BMP and UOP  Pharmacy will continue to follow closely for s/sx of nephrotoxicity, infusion reactions and appropriateness of therapy.  BMP and CBC will be ordered per protocol. We will continue to follow the patient’s culture results and clinical progress daily.    Do Sravanthi Childs, Pharmacist

## 2024-11-02 ENCOUNTER — APPOINTMENT (OUTPATIENT)
Dept: RADIOLOGY | Facility: HOSPITAL | Age: 79
DRG: 271 | End: 2024-11-02
Payer: COMMERCIAL

## 2024-11-02 LAB
ANION GAP SERPL CALCULATED.3IONS-SCNC: 6 MMOL/L (ref 4–13)
BUN SERPL-MCNC: 15 MG/DL (ref 5–25)
CALCIUM SERPL-MCNC: 7.5 MG/DL (ref 8.4–10.2)
CHLORIDE SERPL-SCNC: 112 MMOL/L (ref 96–108)
CO2 SERPL-SCNC: 23 MMOL/L (ref 21–32)
CREAT SERPL-MCNC: 0.8 MG/DL (ref 0.6–1.3)
ERYTHROCYTE [DISTWIDTH] IN BLOOD BY AUTOMATED COUNT: 14.8 % (ref 11.6–15.1)
GFR SERPL CREATININE-BSD FRML MDRD: 85 ML/MIN/1.73SQ M
GLUCOSE SERPL-MCNC: 101 MG/DL (ref 65–140)
GLUCOSE SERPL-MCNC: 111 MG/DL (ref 65–140)
GLUCOSE SERPL-MCNC: 75 MG/DL (ref 65–140)
GLUCOSE SERPL-MCNC: 83 MG/DL (ref 65–140)
GLUCOSE SERPL-MCNC: 85 MG/DL (ref 65–140)
HCT VFR BLD AUTO: 31.6 % (ref 36.5–49.3)
HGB BLD-MCNC: 9.8 G/DL (ref 12–17)
MCH RBC QN AUTO: 30.7 PG (ref 26.8–34.3)
MCHC RBC AUTO-ENTMCNC: 31 G/DL (ref 31.4–37.4)
MCV RBC AUTO: 99 FL (ref 82–98)
PLATELET # BLD AUTO: 192 THOUSANDS/UL (ref 149–390)
PMV BLD AUTO: 10 FL (ref 8.9–12.7)
POTASSIUM SERPL-SCNC: 4.3 MMOL/L (ref 3.5–5.3)
RBC # BLD AUTO: 3.19 MILLION/UL (ref 3.88–5.62)
SODIUM SERPL-SCNC: 141 MMOL/L (ref 135–147)
WBC # BLD AUTO: 8.57 THOUSAND/UL (ref 4.31–10.16)

## 2024-11-02 PROCEDURE — 82948 REAGENT STRIP/BLOOD GLUCOSE: CPT

## 2024-11-02 PROCEDURE — 99232 SBSQ HOSP IP/OBS MODERATE 35: CPT | Performed by: NURSE PRACTITIONER

## 2024-11-02 PROCEDURE — 85027 COMPLETE CBC AUTOMATED: CPT | Performed by: NURSE PRACTITIONER

## 2024-11-02 PROCEDURE — 80048 BASIC METABOLIC PNL TOTAL CA: CPT | Performed by: NURSE PRACTITIONER

## 2024-11-02 RX ORDER — VANCOMYCIN HYDROCHLORIDE 750 MG/150ML
750 INJECTION, SOLUTION INTRAVENOUS EVERY 12 HOURS
Status: DISCONTINUED | OUTPATIENT
Start: 2024-11-02 | End: 2024-11-03

## 2024-11-02 RX ADMIN — OXYCODONE HYDROCHLORIDE 5 MG: 5 TABLET ORAL at 17:38

## 2024-11-02 RX ADMIN — EZETIMIBE 10 MG: 10 TABLET ORAL at 08:46

## 2024-11-02 RX ADMIN — PREDNISONE 5 MG: 5 TABLET ORAL at 08:46

## 2024-11-02 RX ADMIN — FUROSEMIDE 20 MG: 20 TABLET ORAL at 08:46

## 2024-11-02 RX ADMIN — GABAPENTIN 600 MG: 300 CAPSULE ORAL at 08:46

## 2024-11-02 RX ADMIN — ACETAMINOPHEN 975 MG: 325 TABLET ORAL at 23:17

## 2024-11-02 RX ADMIN — METOPROLOL SUCCINATE 25 MG: 25 TABLET, EXTENDED RELEASE ORAL at 08:46

## 2024-11-02 RX ADMIN — PREDNISONE 5 MG: 5 TABLET ORAL at 17:33

## 2024-11-02 RX ADMIN — CLOPIDOGREL 75 MG: 75 TABLET ORAL at 08:46

## 2024-11-02 RX ADMIN — ENOXAPARIN SODIUM 40 MG: 40 INJECTION SUBCUTANEOUS at 08:46

## 2024-11-02 RX ADMIN — CEFEPIME HYDROCHLORIDE 2000 MG: 2 INJECTION, SOLUTION INTRAVENOUS at 17:33

## 2024-11-02 RX ADMIN — ASPIRIN 81 MG CHEWABLE TABLET 81 MG: 81 TABLET CHEWABLE at 08:46

## 2024-11-02 RX ADMIN — VANCOMYCIN HYDROCHLORIDE 750 MG: 750 INJECTION, SOLUTION INTRAVENOUS at 12:08

## 2024-11-02 RX ADMIN — LOSARTAN POTASSIUM 12.5 MG: 25 TABLET, FILM COATED ORAL at 08:46

## 2024-11-02 RX ADMIN — GABAPENTIN 600 MG: 300 CAPSULE ORAL at 17:32

## 2024-11-02 RX ADMIN — ATORVASTATIN CALCIUM 80 MG: 80 TABLET, FILM COATED ORAL at 17:33

## 2024-11-02 RX ADMIN — ACETAMINOPHEN 975 MG: 325 TABLET ORAL at 12:07

## 2024-11-02 RX ADMIN — ACETAMINOPHEN 975 MG: 325 TABLET ORAL at 05:28

## 2024-11-02 RX ADMIN — VANCOMYCIN HYDROCHLORIDE 750 MG: 750 INJECTION, SOLUTION INTRAVENOUS at 23:13

## 2024-11-02 RX ADMIN — POTASSIUM CHLORIDE 20 MEQ: 1500 TABLET, EXTENDED RELEASE ORAL at 08:46

## 2024-11-02 RX ADMIN — CEFEPIME HYDROCHLORIDE 2000 MG: 2 INJECTION, SOLUTION INTRAVENOUS at 05:25

## 2024-11-02 NOTE — ASSESSMENT & PLAN NOTE
Patient with metastatic prostate cancer  Currently on chemotherapy with Jevtana every 3 weeks  Continue prednisone  Follows with Dr. Hernandez, [facility] oncology  Patient reports he was to have Jevtana last Wednesday; missed due to hospitalization  Resume with Dr. Hernandez on discharge

## 2024-11-02 NOTE — PLAN OF CARE
Problem: PAIN - ADULT  Goal: Verbalizes/displays adequate comfort level or baseline comfort level  Description: Interventions:  - Encourage patient to monitor pain and request assistance  - Assess pain using appropriate pain scale  - Administer analgesics based on type and severity of pain and evaluate response  - Implement non-pharmacological measures as appropriate and evaluate response  - Consider cultural and social influences on pain and pain management  - Notify physician/advanced practitioner if interventions unsuccessful or patient reports new pain  11/2/2024 1036 by Orly Layne RN  Outcome: Progressing  11/2/2024 1036 by Orly Layne RN  Outcome: Progressing     Problem: INFECTION - ADULT  Goal: Absence or prevention of progression during hospitalization  Description: INTERVENTIONS:  - Assess and monitor for signs and symptoms of infection  - Monitor lab/diagnostic results  - Monitor all insertion sites, i.e. indwelling lines, tubes, and drains  - Monitor endotracheal if appropriate and nasal secretions for changes in amount and color  - [place] appropriate cooling/warming therapies per order  - Administer medications as ordered  - Instruct and encourage patient and family to use good hand hygiene technique  - Identify and instruct in appropriate isolation precautions for identified infection/condition  11/2/2024 1036 by Orly Layne RN  Outcome: Progressing  11/2/2024 1036 by Orly Layne RN  Outcome: Progressing     Problem: SAFETY ADULT  Goal: Patient will remain free of falls  Description: INTERVENTIONS:  - Educate patient/family on patient safety including physical limitations  - Instruct patient to call for assistance with activity   - Consult OT/PT to assist with strengthening/mobility   - Keep Call bell within reach  - Keep bed low and locked with side rails adjusted as appropriate  - Keep care items and personal belongings within reach  - Initiate and maintain comfort rounds  -  Make Fall Risk Sign visible to staff  - Offer Toileting every 2 Hours, in advance of need  - Initiate/Maintain bed alarm  - Obtain necessary fall risk management equipment: yes  - Apply yellow socks and bracelet for high fall risk patients  - Consider moving patient to room near nurses station  11/2/2024 1036 by Orly Layne RN  Outcome: Progressing  11/2/2024 1036 by Orly Layne RN  Outcome: Progressing  Goal: Maintain or return to baseline ADL function  Description: INTERVENTIONS:  -  Assess patient's ability to carry out ADLs; assess patient's baseline for ADL function and identify physical deficits which impact ability to perform ADLs (bathing, care of mouth/teeth, toileting, grooming, dressing, etc.)  - Assess/evaluate cause of self-care deficits   - Assess range of motion  - Assess patient's mobility; develop plan if impaired  - Assess patient's need for assistive devices and provide as appropriate  - Encourage maximum independence but intervene and supervise when necessary  - Involve family in performance of ADLs  - Assess for home care needs following discharge   - Consider OT consult to assist with ADL evaluation and planning for discharge  - Provide patient education as appropriate  11/2/2024 1036 by Orly Layne RN  Outcome: Progressing  11/2/2024 1036 by Orly Layne RN  Outcome: Progressing  Goal: Maintains/Returns to pre admission functional level  Description: INTERVENTIONS:  - Perform AM-PAC 6 Click Basic Mobility/ Daily Activity assessment daily.  - Set and communicate daily mobility goal to care team and patient/family/caregiver.   - Collaborate with rehabilitation services on mobility goals if consulted  - Perform Range of Motion 3 times a day.  - Reposition patient every 2 hours.  - Dangle patient 3 times a day  - Stand patient 3 times a day  - Ambulate patient 3 times a day  - Out of bed to chair 3 times a day   - Out of bed for meals 3 times a day  - Out of bed for toileting  -  Record patient progress and toleration of activity level   11/2/2024 1036 by Orly Layne RN  Outcome: Progressing  11/2/2024 1036 by Orly Layne RN  Outcome: Progressing     Problem: DISCHARGE PLANNING  Goal: Discharge to home or other facility with appropriate resources  Description: INTERVENTIONS:  - Identify barriers to discharge w/patient and caregiver  - Arrange for needed discharge resources and transportation as appropriate  - Identify discharge learning needs (meds, wound care, etc.)  - Arrange for interpretive services to assist at discharge as needed  - Refer to Case Management Department for coordinating discharge planning if the patient needs post-hospital services based on physician/advanced practitioner order or complex needs related to functional status, cognitive ability, or social support system  11/2/2024 1036 by Orly Layne RN  Outcome: Progressing  11/2/2024 1036 by Orly Layne RN  Outcome: Progressing     Problem: Knowledge Deficit  Goal: Patient/family/caregiver demonstrates understanding of disease process, treatment plan, medications, and discharge instructions  Description: Complete learning assessment and assess knowledge base.  Interventions:  - Provide teaching at level of understanding  - Provide teaching via preferred learning methods  11/2/2024 1036 by Orly Layne RN  Outcome: Progressing  11/2/2024 1036 by Orly Layne RN  Outcome: Progressing     Problem: NEUROSENSORY - ADULT  Goal: Achieves stable or improved neurological status  Description: INTERVENTIONS  - Monitor and report changes in neurological status  - Monitor vital signs such as temperature, blood pressure, glucose, and any other labs ordered   - Initiate measures to prevent increased intracranial pressure  - Monitor for seizure activity and implement precautions if appropriate      11/2/2024 1036 by Orly Layne RN  Outcome: Progressing  11/2/2024 1036 by Orly Layne RN  Outcome:  Progressing  Goal: Achieves maximal functionality and self care  Description: INTERVENTIONS  - Monitor swallowing and airway patency with patient fatigue and changes in neurological status  - Encourage and assist patient to increase activity and self care.   - Encourage visually impaired, hearing impaired and aphasic patients to use assistive/communication devices  11/2/2024 1036 by Orly Layne RN  Outcome: Progressing  11/2/2024 1036 by Orly Layne RN  Outcome: Progressing     Problem: METABOLIC, FLUID AND ELECTROLYTES - ADULT  Goal: Electrolytes maintained within normal limits  Description: INTERVENTIONS:  - Monitor labs and assess patient for signs and symptoms of electrolyte imbalances  - Administer electrolyte replacement as ordered  - Monitor response to electrolyte replacements, including repeat lab results as appropriate  - Instruct patient on fluid and nutrition as appropriate  11/2/2024 1036 by Orly aLyne RN  Outcome: Progressing  11/2/2024 1036 by Orly Layne RN  Outcome: Progressing  Goal: Fluid balance maintained  Description: INTERVENTIONS:  - Monitor labs   - Monitor I/O and WT  - Instruct patient on fluid and nutrition as appropriate  - Assess for signs & symptoms of volume excess or deficit  11/2/2024 1036 by Orly Layne RN  Outcome: Progressing  11/2/2024 1036 by Orly Layne RN  Outcome: Progressing  Goal: Glucose maintained within target range  Description: INTERVENTIONS:  - Monitor Blood Glucose as ordered  - Assess for signs and symptoms of hyperglycemia and hypoglycemia  - Administer ordered medications to maintain glucose within target range  - Assess nutritional intake and initiate nutrition service referral as needed  11/2/2024 1036 by Orly Layne RN  Outcome: Progressing  11/2/2024 1036 by Orly Layne RN  Outcome: Progressing     Problem: SKIN/TISSUE INTEGRITY - ADULT  Goal: Skin Integrity remains intact(Skin Breakdown Prevention)  Description:  Assess:  -Perform Harshal assessment every shift  -Clean and moisturize skin every shift   -Inspect skin when repositioning, toileting, and assisting with ADLS  -Assess under medical devices such as masimo every shift   -Assess extremities for adequate circulation and sensation     Bed Management:  -Have minimal linens on bed & keep smooth, unwrinkled  -Change linens as needed when moist or perspiring  -Avoid sitting or lying in one position for more than 2 hours while in bed  -Keep HOB at 30 degrees     Toileting:  -Offer bedside commode  -Assess for incontinence every shift   -Use incontinent care products after each incontinent episode such as remedy    Activity:  -Mobilize patient 3 times a day  -Encourage activity and walks on unit  -Encourage or provide ROM exercises   -Turn and reposition patient every 2 Hours  -Use appropriate equipment to lift or move patient in bed  -Instruct/ Assist with weight shifting every 30 minutes  when out of bed in chair  -Consider limitation of chair time 2 hour intervals    Skin Care:  -Avoid use of baby powder, tape, friction and shearing, hot water or constrictive clothing  -Relieve pressure over bony prominences using wedges and pillows   -Do not massage red bony areas    Next Steps:  -Teach patient strategies to minimize risks such as weight shifting    -Consider consults to  interdisciplinary teams   11/2/2024 1036 by Orly Layne RN  Outcome: Progressing  11/2/2024 1036 by Orly Layne RN  Outcome: Progressing  Goal: Incision(s), wounds(s) or drain site(s) healing without S/S of infection  Description: INTERVENTIONS  - Assess and document dressing, incision, wound bed, drain sites and surrounding tissue  - Provide patient and family education  - Perform skin care/dressing changes every 2 hours  11/2/2024 1036 by Orly Layne RN  Outcome: Progressing  11/2/2024 1036 by Orly Layne RN  Outcome: Progressing  Goal: Pressure injury heals and does not  worsen  Description: Interventions:  - Implement low air loss mattress or specialty surface (Criteria met)  - Apply silicone foam dressing  - Instruct/assist with weight shifting every 120 minutes when in chair   - Limit chair time to 2 hour intervals  - Use special pressure reducing interventions such as waffle cushions when in chair   - Apply fecal or urinary incontinence containment device   - Perform passive or active ROM every 2 hours  - Turn and reposition patient & offload bony prominences every 2 hours   - Utilize friction reducing device or surface for transfers   - Consider nutrition services referral as needed  11/2/2024 1036 by Orly Layne RN  Outcome: Progressing  11/2/2024 1036 by Orly Layne RN  Outcome: Progressing     Problem: MUSCULOSKELETAL - ADULT  Goal: Maintain or return mobility to safest level of function  Description: INTERVENTIONS:  - Assess patient's ability to carry out ADLs; assess patient's baseline for ADL function and identify physical deficits which impact ability to perform ADLs (bathing, care of mouth/teeth, toileting, grooming, dressing, etc.)  - Assess/evaluate cause of self-care deficits   - Assess range of motion  - Assess patient's mobility  - Assess patient's need for assistive devices and provide as appropriate  - Encourage maximum independence but intervene and supervise when necessary  - Involve family in performance of ADLs  - Assess for home care needs following discharge   - Consider OT consult to assist with ADL evaluation and planning for discharge  - Provide patient education as appropriate  11/2/2024 1036 by Orly Layne RN  Outcome: Progressing  11/2/2024 1036 by Orly Layne RN  Outcome: Progressing  Goal: Maintain proper alignment of affected body part  Description: INTERVENTIONS:  - Support, maintain and protect limb and body alignment  - Provide patient/ family with appropriate education  11/2/2024 1036 by Orly Layne RN  Outcome:  Progressing  11/2/2024 1036 by Orly Layne RN  Outcome: Progressing

## 2024-11-02 NOTE — ASSESSMENT & PLAN NOTE
Patient originally presented to [facility] with worsening wound on right great toe since hitting it recently.  XR R foot suspicious for osteomyelitis of great toe  Wound culture grew pseudomonas  Currently on cefepime and vancomycin  Podiatry and vascular surgery consulted  S/p Angiogram on 10/30 with recanalization of right SFA, no distal target present for distal revascularization  Discussed with vascular; indicated patient is not a candidate for conventional recannulization; recommending following up outpatient with Dr. Harper for vein mapping and possible Limb Omar intervention  Repeat XR R foot shows no signs of osteomyelitis  Ceretec scan ordered per podiatry; results pending; final phase of testing to occur today  Discussed with podiatry; would be okay for discharge on oral abx and referral to wound care center  Spoke with patient; he has barriers to getting to wound care; working with case management to see if we can get VNA for patient to do wound care  Continue dressing changes every other day at this time and continue IV abx while here

## 2024-11-02 NOTE — ASSESSMENT & PLAN NOTE
Wt Readings from Last 3 Encounters:   11/02/24 99.8 kg (220 lb)   10/28/24 98.9 kg (218 lb 0.6 oz)   10/09/24 100 kg (221 lb 5.5 oz)     ECHO 8/2023: EF 30-35%, systolic function severely reduced, diastolic function abnormal consistent with grade I relaxation  Continue Lasix 20 mg daily, Toprol XL 25 mg daily  Appears euvolemic  Monitor Is/Os and daily weights

## 2024-11-02 NOTE — ASSESSMENT & PLAN NOTE
Wound noted; podiatry following  Follow up on ceretec scan; final phase to be done today   Vascular also following as noted above   Local wound care   Surgical shoe

## 2024-11-02 NOTE — PROGRESS NOTES
Royce Rene is a 78 y.o. male who is currently ordered Vancomycin IV with management by the Pharmacy Consult service.  Relevant clinical data and objective / subjective history reviewed.  Vancomycin Assessment:  Indication and Goal AUC/Trough: Soft tissue (goal -600, trough >10), -600, trough >10  Clinical Status: stable  Renal Function:  SCr: 0.8 mg/dL  CrCl: 84.4 mL/min  Renal replacement: Not on dialysis  Days of Therapy: 12  Current Dose: 1000mg IV q12h  Vancomycin Plan:  New Dosinmg IV q12h  Estimated AUC: 415 mcg*hr/mL  Estimated Trough: 12.9 mcg/mL  Next Level: 24 @0600  Renal Function Monitoring: Daily BMP and UOP  Pharmacy will continue to follow closely for s/sx of nephrotoxicity, infusion reactions and appropriateness of therapy.  BMP and CBC will be ordered per protocol. We will continue to follow the patient’s culture results and clinical progress daily.    Do Sravanthi Childs, Pharmacist

## 2024-11-02 NOTE — CASE MANAGEMENT
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     Ischemic pain of foot at rest 10/29/2024     Ulcer of right foot with fat layer exposed ([facility]) 10/29/2024     Ulcer of left foot, limited to breakdown of skin ([facility]) 10/29/2024     Osteomyelitis ([facility]) 10/22/2024     Prevention of chemotherapy-induced neutropenia 2024     Frail elder // vulnerable adult 2024     Elevated liver enzymes 2024     Proctitis 2024     Class 2 severe obesity due to excess calories with serious comorbidity and body mass index (BMI) of 36.0 to 36.9 in adult ([facility]) 2023     Moderate vascular dementia ([facility]) 2023     Ambulatory dysfunction 2022     Peripheral neuropathy 2022     Financial problems 2022     Cancer-related pain 05/10/2022     Palliative care patient 05/10/2022     Malignant neoplasm metastatic to bone ([facility]) 2022     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     Depression, recurrent ([facility]) 2022     Port-A-Cath in place 10/05/2021     PAD (peripheral artery disease) ([facility]) 2021     Urinary retention 2021     CAD (coronary artery disease) 07/10/2021     Ischemic leg pain 2021     Prostate cancer ([facility]) 2021     Ischemic cardiomyopathy 2021     Chronic combined systolic and diastolic CHF (congestive heart failure) ([facility]) 2021     S/P AVR 2021     Anemia 2021       LOS (days): 5  Geometric Mean LOS (GMLOS) (days): 3.8  Days to GMLOS:-1.1     OBJECTIVE:  Risk of Unplanned Readmission Score: 22.51     Current admission status: Inpatient   Preferred Pharmacy:   [facility] - [place] -  [address]  [address] [facility] [place] [address]  Phone: [phone] Fax: [phone]    [facility] [place] [place] - [address]  [address]  [address]  [place] [address]  Phone: [phone] Fax: [phone]    [facility] - [place] - [address]  [address]  [address]  [place] [address]  Phone: [phone] Fax: [phone]    [facility] - [place], [place] - [address]  [address] [facility] [address] [facility] [address]  Phone: [phone] Fax: [phone]    Primary Care Provider: Anne Chandra MD    Primary Insurance: [facility]  Secondary Insurance: [facility]    DISCHARGE DETAILS:    Other Referral/Resources/Interventions Provided:  Referral Comments: CM reserved [facility] in [place].    Treatment Team Recommendation: Home with Home Health Care  Discharge Destination Plan:: Home with Home Health Care ([facility] with [facility])    CRNP made CM aware that patient is likely to be cleared for discharge tomorrow. CM confirmed patient has [facility] with [facility] and sent agency a message in [place] making aware of patient's anticipated discharge tomorrow.

## 2024-11-02 NOTE — PROGRESS NOTES
Progress Note - Hospitalist   Name: Royce Rene 78 y.o. male I MRN: [id number]  Unit/Bed#: [address] [place], [place] [address] I Date of Admission: 10/28/2024   Date of Service: 11/2/2024 I Hospital Day: 5    Assessment & Plan  Osteomyelitis (Formerly [facility])  Patient originally presented to [facility] with worsening wound on right great toe since hitting it recently.  XR R foot suspicious for osteomyelitis of great toe  Wound culture grew pseudomonas  Currently on cefepime and vancomycin  Podiatry and vascular surgery consulted  S/p Angiogram on 10/30 with recanalization of right SFA, no distal target present for distal revascularization  Discussed with vascular; indicated patient is not a candidate for conventional recannulization; recommending following up outpatient with Dr. Harper for vein mapping and possible Limb Omar intervention  Repeat XR R foot shows no signs of osteomyelitis  Ceretec scan ordered per podiatry; results pending; final phase of testing to occur today  Discussed with podiatry; would be okay for discharge on oral abx and referral to wound care center  Spoke with patient; he has barriers to getting to wound care; working with case management to see if we can get VNA for patient to do wound care  Continue dressing changes every other day at this time and continue IV abx while here  PAD (peripheral artery disease) (Formerly [facility])  History of peripheral artery disease s/p prior stenting and angioplasty.  He has had endarterectomy and profundoplasty by Dr. Cook in July 2021  Lower extremity arterial duplex demonstrating high-grade stenosis versus occlusion in the proximal mid and distal superficial femoral artery  Vascular surgery and IR consulted  S/p angiogram 10/30; occluded right SFA recanalized; right anterior tibial, posterior tibial, and peroneal arteries showed multifocal occlusions with no distal target present for revascularization  Continue aspirin, plavix, statin  Vascular input as noted above  Chronic combined systolic and  diastolic CHF (congestive heart failure) (HCC)  Wt Readings from Last 3 Encounters:   11/02/24 99.8 kg (220 lb)   10/28/24 98.9 kg (218 lb 0.6 oz)   10/09/24 100 kg (221 lb 5.5 oz)     ECHO 8/2023: EF 30-35%, systolic function severely reduced, diastolic function abnormal consistent with grade I relaxation  Continue Lasix 20 mg daily, Toprol XL 25 mg daily  Appears euvolemic  Monitor Is/Os and daily weights  CAD (coronary artery disease)  Continue aspirin, statin, plavix, and beta-blocker therapy  Denies chest pain   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 Hernandez, [facility] oncology  Patient reports he was to have Jevtana last Wednesday; missed due to hospitalization  Resume with Dr. 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    Mobility:   Basic Mobility Inpatient Raw Score: 17  JH-HLM Goal: 5: Stand one or more mins  JH-HLM Achieved: 7: Walk 25 feet or more  JH-HLM Goal achieved. Continue to encourage appropriate mobility.    Patient Centered Rounds: I performed bedside rounds with nursing staff today.   Discussions with Specialists or Other Care Team Provider: case management     Education  and Discussions with Family / Patient: Attempted to update  (son) via phone. Left voicemail.     Current Length of Stay: 5 day(s)  Current Patient Status: Inpatient   Certification Statement: The patient will continue to require additional inpatient hospital stay due to ceretec scan, wound care, IV abx   Discharge Plan: Anticipate discharge in 24-48 hrs to discharge location to be determined pending rehab evaluations.    Code Status: Level 1 - Full Code    Subjective   Patient seen sitting up in bed, had breakfast. Slept very well. Still has intermittent foot pain. We discussed potentially going home on oral antibiotics with referral to wound care center; patient indicated he does not have transportation and it would be better if he could have VNA services as he had in the past. Discussing with case management. Patient reports no fever or chills, no nausea or vomiting. He did have a BM yesterday but it was followed by  a bout of diarrhea. No further diarrhea at this time.     Objective :  Temp:  [98.6 °F (37 °C)-99.2 °F (37.3 °C)] 98.6 °F (37 °C)  HR:  [75-95] 95  BP: (108-128)/(60-68) 128/68  Resp:  [16-18] 18  SpO2:  [93 %-97 %] 97 %  O2 Device: None (Room air)    Body mass index is 34.46 kg/m².     Input and Output Summary (last 24 hours):     Intake/Output Summary (Last 24 hours) at 11/2/2024 1009  Last data filed at 11/1/2024 2001  Gross per 24 hour   Intake 360 ml   Output 525 ml   Net -165 ml       Physical Exam  Vitals and nursing note reviewed.   Constitutional:       General: He is not in acute distress.  HENT:      Head: Normocephalic.      Nose: Nose normal.      Mouth/Throat:      Mouth: Mucous membranes are moist.   Eyes:      Extraocular Movements: Extraocular movements intact.      Conjunctiva/sclera: Conjunctivae normal.      Pupils: Pupils are equal, round, and reactive to light.   Cardiovascular:      Rate and Rhythm: Normal rate and regular rhythm.      Pulses: Normal pulses.    Pulmonary:      Effort: Pulmonary effort is normal.      Breath sounds: Normal breath sounds.   Abdominal:      General: Bowel sounds are normal. There is no distension.      Palpations: Abdomen is soft.      Tenderness: There is no abdominal tenderness.   Genitourinary:     Comments: Voiding spontaneously   Musculoskeletal:         General: Normal range of motion.      Cervical back: Normal range of motion.      Right lower leg: No edema.      Left lower leg: No edema.   Skin:     Capillary Refill: Capillary refill takes less than 2 seconds.      Comments: Bilateral foot dressings present; no strike through drainage    Neurological:      General: No focal deficit present.      Mental Status: He is alert and oriented to person, place, and time.   Psychiatric:         Mood and Affect: Mood normal.         Behavior: Behavior normal.         Thought Content: Thought content normal.         Judgment: Judgment normal.           Lines/Drains:  Lines/Drains/Airways       Active Status       Name Placement date Placement time Site Days    Port A Cath 10/28/20 Right Chest 10/28/20  0900  Chest  1466                    Central Line:  Goal for removal: Will discontinue when meds requiring line are completed.               Lab Results: I have reviewed the following results:   Results from last 7 days   Lab Units 11/02/24  0537 10/29/24  0501 10/27/24  0529   WBC Thousand/uL 8.57   < > 9.22   HEMOGLOBIN g/dL 9.8*   < > 12.1   HEMATOCRIT % 31.6*   < > 38.1   PLATELETS Thousands/uL 192   < > 169   SEGS PCT %  --   --  79*   LYMPHO PCT %  --   --  12*   MONO PCT %  --   --  7   EOS PCT %  --   --  0    < > = values in this interval not displayed.     Results from last 7 days   Lab Units 11/02/24  0537   SODIUM mmol/L 141   POTASSIUM mmol/L 4.3   CHLORIDE mmol/L 112*   CO2 mmol/L 23   BUN mg/dL 15   CREATININE mg/dL 0.80   ANION GAP mmol/L 6   CALCIUM mg/dL 7.5*   GLUCOSE RANDOM mg/dL 85     Results from last 7 days   Lab Units  10/30/24  0606   INR  0.98     Results from last 7 days   Lab Units 11/02/24  0719 11/01/24  2052 11/01/24  1605 11/01/24  1103 11/01/24  0709 10/31/24  2006 10/31/24  1615 10/31/24  1117 10/31/24  0703 10/30/24  2116 10/30/24  1732 10/30/24  1139   POC GLUCOSE mg/dl 75 131 142* 133 101 130 128 125 89 147* 92 115               Recent Cultures (last 7 days):         Imaging Results Review: No pertinent imaging studies reviewed.  Other Study Results Review: No additional pertinent studies reviewed.    Last 24 Hours Medication List:     Current Facility-Administered Medications:     acetaminophen (TYLENOL) tablet 975 mg, Q6H DEWAYNE    aspirin chewable tablet 81 mg, Daily    atorvastatin (LIPITOR) tablet 80 mg, Daily With Dinner    bismuth subsalicylate (PEPTO BISMOL) oral suspension 524 mg, Q6H PRN    cefepime (MAXIPIME) IVPB (premix in dextrose) 2,000 mg 50 mL, Q12H, Last Rate: 2,000 mg (11/02/24 0525)    clopidogrel (PLAVIX) tablet 75 mg, Daily    enoxaparin (LOVENOX) subcutaneous injection 40 mg, Q24H DEWAYNE    ezetimibe (ZETIA) tablet 10 mg, Daily    furosemide (LASIX) tablet 20 mg, Daily    gabapentin (NEURONTIN) capsule 600 mg, BID    insulin lispro (HumALOG/ADMELOG) 100 units/mL subcutaneous injection 1-5 Units, TID AC **AND** Fingerstick Glucose (POCT), TID AC    losartan (COZAAR) tablet 12.5 mg, Daily    meperidine (DEMEROL) injection 12.5 mg, Q10 Min PRN    methocarbamol (ROBAXIN) tablet 500 mg, Q8H PRN    metoprolol succinate (TOPROL-XL) 24 hr tablet 25 mg, Daily    ondansetron (ZOFRAN) injection 4 mg, Once PRN    oxyCODONE (ROXICODONE) IR tablet 5 mg, Q4H PRN    potassium chloride (Klor-Con M20) CR tablet 20 mEq, Daily    predniSONE tablet 5 mg, BID With Meals    vancomycin (VANCOCIN) IVPB (premix in dextrose) 750 mg 150 mL, Q12H    Administrative Statements   Today, Patient Was Seen By: OMAIRA Hamm  I have spent a total time of greater than 45 minutes in caring for this patient on the day of  the visit/encounter including Diagnostic results, Counseling / Coordination of care, Documenting in the medical record, Reviewing / ordering tests, medicine, procedures  , and Communicating with other healthcare professionals .    **Please Note: This note may have been constructed using a voice recognition system.**

## 2024-11-02 NOTE — PLAN OF CARE
Problem: PAIN - ADULT  Goal: Verbalizes/displays adequate comfort level or baseline comfort level  Description: Interventions:  - Encourage patient to monitor pain and request assistance  - Assess pain using appropriate pain scale  - Administer analgesics based on type and severity of pain and evaluate response  - Implement non-pharmacological measures as appropriate and evaluate response  - Consider cultural and social influences on pain and pain management  - Notify physician/advanced practitioner if interventions unsuccessful or patient reports new pain  Outcome: Progressing     Problem: INFECTION - ADULT  Goal: Absence or prevention of progression during hospitalization  Description: INTERVENTIONS:  - Assess and monitor for signs and symptoms of infection  - Monitor lab/diagnostic results  - Monitor all insertion sites, i.e. indwelling lines, tubes, and drains  - Monitor endotracheal if appropriate and nasal secretions for changes in amount and color  - [place] appropriate cooling/warming therapies per order  - Administer medications as ordered  - Instruct and encourage patient and family to use good hand hygiene technique  - Identify and instruct in appropriate isolation precautions for identified infection/condition  Outcome: Progressing     Problem: SAFETY ADULT  Goal: Patient will remain free of falls  Description: INTERVENTIONS:  - Educate patient/family on patient safety including physical limitations  - Instruct patient to call for assistance with activity   - Consult OT/PT to assist with strengthening/mobility   - Keep Call bell within reach  - Keep bed low and locked with side rails adjusted as appropriate  - Keep care items and personal belongings within reach  - Initiate and maintain comfort rounds  - Make Fall Risk Sign visible to staff  - Offer Toileting every 2 Hours, in advance of need  - Initiate/Maintain bed alarm  - Obtain necessary fall risk management equipment: yes  - Apply yellow socks  and bracelet for high fall risk patients  - Consider moving patient to room near nurses station  Outcome: Progressing  Goal: Maintain or return to baseline ADL function  Description: INTERVENTIONS:  -  Assess patient's ability to carry out ADLs; assess patient's baseline for ADL function and identify physical deficits which impact ability to perform ADLs (bathing, care of mouth/teeth, toileting, grooming, dressing, etc.)  - Assess/evaluate cause of self-care deficits   - Assess range of motion  - Assess patient's mobility; develop plan if impaired  - Assess patient's need for assistive devices and provide as appropriate  - Encourage maximum independence but intervene and supervise when necessary  - Involve family in performance of ADLs  - Assess for home care needs following discharge   - Consider OT consult to assist with ADL evaluation and planning for discharge  - Provide patient education as appropriate  Outcome: Progressing  Goal: Maintains/Returns to pre admission functional level  Description: INTERVENTIONS:  - Perform AM-PAC 6 Click Basic Mobility/ Daily Activity assessment daily.  - Set and communicate daily mobility goal to care team and patient/family/caregiver.   - Collaborate with rehabilitation services on mobility goals if consulted  - Perform Range of Motion 3 times a day.  - Reposition patient every 2 hours.  - Dangle patient 3 times a day  - Stand patient 3 times a day  - Ambulate patient 3 times a day  - Out of bed to chair 3 times a day   - Out of bed for meals 3 times a day  - Out of bed for toileting  - Record patient progress and toleration of activity level   Outcome: Progressing     Problem: DISCHARGE PLANNING  Goal: Discharge to home or other facility with appropriate resources  Description: INTERVENTIONS:  - Identify barriers to discharge w/patient and caregiver  - Arrange for needed discharge resources and transportation as appropriate  - Identify discharge learning needs (meds, wound  care, etc.)  - Arrange for interpretive services to assist at discharge as needed  - Refer to Case Management Department for coordinating discharge planning if the patient needs post-hospital services based on physician/advanced practitioner order or complex needs related to functional status, cognitive ability, or social support system  Outcome: Progressing     Problem: Knowledge Deficit  Goal: Patient/family/caregiver demonstrates understanding of disease process, treatment plan, medications, and discharge instructions  Description: Complete learning assessment and assess knowledge base.  Interventions:  - Provide teaching at level of understanding  - Provide teaching via preferred learning methods  Outcome: Progressing     Problem: NEUROSENSORY - ADULT  Goal: Achieves stable or improved neurological status  Description: INTERVENTIONS  - Monitor and report changes in neurological status  - Monitor vital signs such as temperature, blood pressure, glucose, and any other labs ordered   - Initiate measures to prevent increased intracranial pressure  - Monitor for seizure activity and implement precautions if appropriate      Outcome: Progressing  Goal: Achieves maximal functionality and self care  Description: INTERVENTIONS  - Monitor swallowing and airway patency with patient fatigue and changes in neurological status  - Encourage and assist patient to increase activity and self care.   - Encourage visually impaired, hearing impaired and aphasic patients to use assistive/communication devices  Outcome: Progressing     Problem: METABOLIC, FLUID AND ELECTROLYTES - ADULT  Goal: Electrolytes maintained within normal limits  Description: INTERVENTIONS:  - Monitor labs and assess patient for signs and symptoms of electrolyte imbalances  - Administer electrolyte replacement as ordered  - Monitor response to electrolyte replacements, including repeat lab results as appropriate  - Instruct patient on fluid and nutrition as  appropriate  Outcome: Progressing  Goal: Fluid balance maintained  Description: INTERVENTIONS:  - Monitor labs   - Monitor I/O and WT  - Instruct patient on fluid and nutrition as appropriate  - Assess for signs & symptoms of volume excess or deficit  Outcome: Progressing  Goal: Glucose maintained within target range  Description: INTERVENTIONS:  - Monitor Blood Glucose as ordered  - Assess for signs and symptoms of hyperglycemia and hypoglycemia  - Administer ordered medications to maintain glucose within target range  - Assess nutritional intake and initiate nutrition service referral as needed  Outcome: Progressing     Problem: SKIN/TISSUE INTEGRITY - ADULT  Goal: Skin Integrity remains intact(Skin Breakdown Prevention)  Description: Assess:  -Perform Harshal assessment every shift  -Clean and moisturize skin every shift   -Inspect skin when repositioning, toileting, and assisting with ADLS  -Assess under medical devices such as masimo every shift   -Assess extremities for adequate circulation and sensation     Bed Management:  -Have minimal linens on bed & keep smooth, unwrinkled  -Change linens as needed when moist or perspiring  -Avoid sitting or lying in one position for more than 2 hours while in bed  -Keep HOB at 30 degrees     Toileting:  -Offer bedside commode  -Assess for incontinence every shift   -Use incontinent care products after each incontinent episode such as remedy    Activity:  -Mobilize patient 3 times a day  -Encourage activity and walks on unit  -Encourage or provide ROM exercises   -Turn and reposition patient every 2 Hours  -Use appropriate equipment to lift or move patient in bed  -Instruct/ Assist with weight shifting every 30 minutes  when out of bed in chair  -Consider limitation of chair time 2 hour intervals    Skin Care:  -Avoid use of baby powder, tape, friction and shearing, hot water or constrictive clothing  -Relieve pressure over bony prominences using wedges and pillows   -Do  not massage red bony areas    Next Steps:  -Teach patient strategies to minimize risks such as weight shifting    -Consider consults to  interdisciplinary teams   Outcome: Progressing  Goal: Incision(s), wounds(s) or drain site(s) healing without S/S of infection  Description: INTERVENTIONS  - Assess and document dressing, incision, wound bed, drain sites and surrounding tissue  - Provide patient and family education  - Perform skin care/dressing changes every 2 hours  Outcome: Progressing  Goal: Pressure injury heals and does not worsen  Description: Interventions:  - Implement low air loss mattress or specialty surface (Criteria met)  - Apply silicone foam dressing  - Instruct/assist with weight shifting every 120 minutes when in chair   - Limit chair time to 2 hour intervals  - Use special pressure reducing interventions such as waffle cushions when in chair   - Apply fecal or urinary incontinence containment device   - Perform passive or active ROM every 2 hours  - Turn and reposition patient & offload bony prominences every 2 hours   - Utilize friction reducing device or surface for transfers   - Consider nutrition services referral as needed  Outcome: Progressing     Problem: MUSCULOSKELETAL - ADULT  Goal: Maintain or return mobility to safest level of function  Description: INTERVENTIONS:  - Assess patient's ability to carry out ADLs; assess patient's baseline for ADL function and identify physical deficits which impact ability to perform ADLs (bathing, care of mouth/teeth, toileting, grooming, dressing, etc.)  - Assess/evaluate cause of self-care deficits   - Assess range of motion  - Assess patient's mobility  - Assess patient's need for assistive devices and provide as appropriate  - Encourage maximum independence but intervene and supervise when necessary  - Involve family in performance of ADLs  - Assess for home care needs following discharge   - Consider OT consult to assist with ADL evaluation and  planning for discharge  - Provide patient education as appropriate  Outcome: Progressing  Goal: Maintain proper alignment of affected body part  Description: INTERVENTIONS:  - Support, maintain and protect limb and body alignment  - Provide patient/ family with appropriate education  Outcome: Progressing

## 2024-11-03 VITALS
HEIGHT: 67 IN | OXYGEN SATURATION: 97 % | HEART RATE: 80 BPM | TEMPERATURE: 98.1 F | WEIGHT: 214.6 LBS | DIASTOLIC BLOOD PRESSURE: 82 MMHG | SYSTOLIC BLOOD PRESSURE: 142 MMHG | BODY MASS INDEX: 33.68 KG/M2 | RESPIRATION RATE: 18 BRPM

## 2024-11-03 LAB
ANION GAP SERPL CALCULATED.3IONS-SCNC: 6 MMOL/L (ref 4–13)
BUN SERPL-MCNC: 14 MG/DL (ref 5–25)
CALCIUM SERPL-MCNC: 8 MG/DL (ref 8.4–10.2)
CHLORIDE SERPL-SCNC: 113 MMOL/L (ref 96–108)
CO2 SERPL-SCNC: 24 MMOL/L (ref 21–32)
CREAT SERPL-MCNC: 0.74 MG/DL (ref 0.6–1.3)
ERYTHROCYTE [DISTWIDTH] IN BLOOD BY AUTOMATED COUNT: 14.6 % (ref 11.6–15.1)
GFR SERPL CREATININE-BSD FRML MDRD: 88 ML/MIN/1.73SQ M
GLUCOSE SERPL-MCNC: 85 MG/DL (ref 65–140)
GLUCOSE SERPL-MCNC: 88 MG/DL (ref 65–140)
GLUCOSE SERPL-MCNC: 89 MG/DL (ref 65–140)
HCT VFR BLD AUTO: 28 % (ref 36.5–49.3)
HGB BLD-MCNC: 8.9 G/DL (ref 12–17)
MCH RBC QN AUTO: 30.9 PG (ref 26.8–34.3)
MCHC RBC AUTO-ENTMCNC: 31.8 G/DL (ref 31.4–37.4)
MCV RBC AUTO: 97 FL (ref 82–98)
PLATELET # BLD AUTO: 196 THOUSANDS/UL (ref 149–390)
PMV BLD AUTO: 9.6 FL (ref 8.9–12.7)
POTASSIUM SERPL-SCNC: 3.9 MMOL/L (ref 3.5–5.3)
RBC # BLD AUTO: 2.88 MILLION/UL (ref 3.88–5.62)
SODIUM SERPL-SCNC: 143 MMOL/L (ref 135–147)
WBC # BLD AUTO: 7.4 THOUSAND/UL (ref 4.31–10.16)

## 2024-11-03 PROCEDURE — 99239 HOSP IP/OBS DSCHRG MGMT >30: CPT | Performed by: NURSE PRACTITIONER

## 2024-11-03 PROCEDURE — 85027 COMPLETE CBC AUTOMATED: CPT | Performed by: NURSE PRACTITIONER

## 2024-11-03 PROCEDURE — 82948 REAGENT STRIP/BLOOD GLUCOSE: CPT

## 2024-11-03 PROCEDURE — 80048 BASIC METABOLIC PNL TOTAL CA: CPT | Performed by: NURSE PRACTITIONER

## 2024-11-03 RX ORDER — CIPROFLOXACIN 500 MG/1
500 TABLET, FILM COATED ORAL EVERY 12 HOURS SCHEDULED
Qty: 60 TABLET | Refills: 0 | Status: SHIPPED | OUTPATIENT
Start: 2024-11-03 | End: 2024-12-03

## 2024-11-03 RX ORDER — PENTOXIFYLLINE 400 MG/1
400 TABLET, EXTENDED RELEASE ORAL
Status: DISCONTINUED | OUTPATIENT
Start: 2024-11-03 | End: 2024-11-03 | Stop reason: HOSPADM

## 2024-11-03 RX ORDER — VANCOMYCIN HYDROCHLORIDE 1 G/200ML
1000 INJECTION, SOLUTION INTRAVENOUS EVERY 12 HOURS
Status: DISCONTINUED | OUTPATIENT
Start: 2024-11-03 | End: 2024-11-03 | Stop reason: HOSPADM

## 2024-11-03 RX ORDER — PENTOXIFYLLINE 400 MG/1
400 TABLET, EXTENDED RELEASE ORAL
Qty: 90 TABLET | Refills: 0 | Status: SHIPPED | OUTPATIENT
Start: 2024-11-03 | End: 2024-12-03

## 2024-11-03 RX ADMIN — METOPROLOL SUCCINATE 25 MG: 25 TABLET, EXTENDED RELEASE ORAL at 08:56

## 2024-11-03 RX ADMIN — PENTOXIFYLLINE 400 MG: 400 TABLET, EXTENDED RELEASE ORAL at 11:30

## 2024-11-03 RX ADMIN — ACETAMINOPHEN 975 MG: 325 TABLET ORAL at 05:42

## 2024-11-03 RX ADMIN — LOSARTAN POTASSIUM 12.5 MG: 25 TABLET, FILM COATED ORAL at 08:57

## 2024-11-03 RX ADMIN — FUROSEMIDE 20 MG: 20 TABLET ORAL at 08:57

## 2024-11-03 RX ADMIN — ASPIRIN 81 MG CHEWABLE TABLET 81 MG: 81 TABLET CHEWABLE at 08:56

## 2024-11-03 RX ADMIN — CEFEPIME HYDROCHLORIDE 2000 MG: 2 INJECTION, SOLUTION INTRAVENOUS at 05:41

## 2024-11-03 RX ADMIN — PENTOXIFYLLINE 400 MG: 400 TABLET, EXTENDED RELEASE ORAL at 08:56

## 2024-11-03 RX ADMIN — EZETIMIBE 10 MG: 10 TABLET ORAL at 08:56

## 2024-11-03 RX ADMIN — CLOPIDOGREL 75 MG: 75 TABLET ORAL at 08:56

## 2024-11-03 RX ADMIN — ACETAMINOPHEN 975 MG: 325 TABLET ORAL at 12:29

## 2024-11-03 RX ADMIN — OXYCODONE HYDROCHLORIDE 5 MG: 5 TABLET ORAL at 11:30

## 2024-11-03 RX ADMIN — VANCOMYCIN HYDROCHLORIDE 1000 MG: 1 INJECTION, SOLUTION INTRAVENOUS at 11:27

## 2024-11-03 RX ADMIN — PREDNISONE 5 MG: 5 TABLET ORAL at 08:56

## 2024-11-03 RX ADMIN — GABAPENTIN 600 MG: 300 CAPSULE ORAL at 08:56

## 2024-11-03 RX ADMIN — ENOXAPARIN SODIUM 40 MG: 40 INJECTION SUBCUTANEOUS at 08:55

## 2024-11-03 RX ADMIN — POTASSIUM CHLORIDE 20 MEQ: 1500 TABLET, EXTENDED RELEASE ORAL at 08:57

## 2024-11-03 NOTE — ASSESSMENT & PLAN NOTE
Local wound care  Home VNA arranged via case management for wound care  Close follow-up with outpatient vascular and podiatry

## 2024-11-03 NOTE — PLAN OF CARE
Problem: PAIN - ADULT  Goal: Verbalizes/displays adequate comfort level or baseline comfort level  Description: Interventions:  - Encourage patient to monitor pain and request assistance  - Assess pain using appropriate pain scale  - Administer analgesics based on type and severity of pain and evaluate response  - Implement non-pharmacological measures as appropriate and evaluate response  - Consider cultural and social influences on pain and pain management  - Notify physician/advanced practitioner if interventions unsuccessful or patient reports new pain  Outcome: Adequate for Discharge     Problem: INFECTION - ADULT  Goal: Absence or prevention of progression during hospitalization  Description: INTERVENTIONS:  - Assess and monitor for signs and symptoms of infection  - Monitor lab/diagnostic results  - Monitor all insertion sites, i.e. indwelling lines, tubes, and drains  - Monitor endotracheal if appropriate and nasal secretions for changes in amount and color  - [place] appropriate cooling/warming therapies per order  - Administer medications as ordered  - Instruct and encourage patient and family to use good hand hygiene technique  - Identify and instruct in appropriate isolation precautions for identified infection/condition  Outcome: Progressing     Problem: SAFETY ADULT  Goal: Patient will remain free of falls  Description: INTERVENTIONS:  - Educate patient/family on patient safety including physical limitations  - Instruct patient to call for assistance with activity   - Consult OT/PT to assist with strengthening/mobility   - Keep Call bell within reach  - Keep bed low and locked with side rails adjusted as appropriate  - Keep care items and personal belongings within reach  - Initiate and maintain comfort rounds  - Make Fall Risk Sign visible to staff  - Offer Toileting every 2 Hours, in advance of need  - Initiate/Maintain bed alarm  - Obtain necessary fall risk management equipment: yes  - Apply  yellow socks and bracelet for high fall risk patients  - Consider moving patient to room near nurses station  Outcome: Adequate for Discharge  Goal: Maintain or return to baseline ADL function  Description: INTERVENTIONS:  -  Assess patient's ability to carry out ADLs; assess patient's baseline for ADL function and identify physical deficits which impact ability to perform ADLs (bathing, care of mouth/teeth, toileting, grooming, dressing, etc.)  - Assess/evaluate cause of self-care deficits   - Assess range of motion  - Assess patient's mobility; develop plan if impaired  - Assess patient's need for assistive devices and provide as appropriate  - Encourage maximum independence but intervene and supervise when necessary  - Involve family in performance of ADLs  - Assess for home care needs following discharge   - Consider OT consult to assist with ADL evaluation and planning for discharge  - Provide patient education as appropriate  Outcome: Progressing  Goal: Maintains/Returns to pre admission functional level  Description: INTERVENTIONS:  - Perform AM-PAC 6 Click Basic Mobility/ Daily Activity assessment daily.  - Set and communicate daily mobility goal to care team and patient/family/caregiver.   - Collaborate with rehabilitation services on mobility goals if consulted  - Perform Range of Motion 3 times a day.  - Reposition patient every 2 hours.  - Dangle patient 3 times a day  - Stand patient 3 times a day  - Ambulate patient 3 times a day  - Out of bed to chair 3 times a day   - Out of bed for meals 3 times a day  - Out of bed for toileting  - Record patient progress and toleration of activity level   Outcome: Progressing     Problem: DISCHARGE PLANNING  Goal: Discharge to home or other facility with appropriate resources  Description: INTERVENTIONS:  - Identify barriers to discharge w/patient and caregiver  - Arrange for needed discharge resources and transportation as appropriate  - Identify discharge  learning needs (meds, wound care, etc.)  - Arrange for interpretive services to assist at discharge as needed  - Refer to Case Management Department for coordinating discharge planning if the patient needs post-hospital services based on physician/advanced practitioner order or complex needs related to functional status, cognitive ability, or social support system  Outcome: Adequate for Discharge     Problem: Knowledge Deficit  Goal: Patient/family/caregiver demonstrates understanding of disease process, treatment plan, medications, and discharge instructions  Description: Complete learning assessment and assess knowledge base.  Interventions:  - Provide teaching at level of understanding  - Provide teaching via preferred learning methods  Outcome: Adequate for Discharge     Problem: NEUROSENSORY - ADULT  Goal: Achieves stable or improved neurological status  Description: INTERVENTIONS  - Monitor and report changes in neurological status  - Monitor vital signs such as temperature, blood pressure, glucose, and any other labs ordered   - Initiate measures to prevent increased intracranial pressure  - Monitor for seizure activity and implement precautions if appropriate      Outcome: Adequate for Discharge  Goal: Achieves maximal functionality and self care  Description: INTERVENTIONS  - Monitor swallowing and airway patency with patient fatigue and changes in neurological status  - Encourage and assist patient to increase activity and self care.   - Encourage visually impaired, hearing impaired and aphasic patients to use assistive/communication devices  Outcome: Adequate for Discharge     Problem: METABOLIC, FLUID AND ELECTROLYTES - ADULT  Goal: Electrolytes maintained within normal limits  Description: INTERVENTIONS:  - Monitor labs and assess patient for signs and symptoms of electrolyte imbalances  - Administer electrolyte replacement as ordered  - Monitor response to electrolyte replacements, including repeat lab  results as appropriate  - Instruct patient on fluid and nutrition as appropriate  Outcome: Adequate for Discharge  Goal: Fluid balance maintained  Description: INTERVENTIONS:  - Monitor labs   - Monitor I/O and WT  - Instruct patient on fluid and nutrition as appropriate  - Assess for signs & symptoms of volume excess or deficit  Outcome: Adequate for Discharge  Goal: Glucose maintained within target range  Description: INTERVENTIONS:  - Monitor Blood Glucose as ordered  - Assess for signs and symptoms of hyperglycemia and hypoglycemia  - Administer ordered medications to maintain glucose within target range  - Assess nutritional intake and initiate nutrition service referral as needed  Outcome: Adequate for Discharge     Problem: SKIN/TISSUE INTEGRITY - ADULT  Goal: Skin Integrity remains intact(Skin Breakdown Prevention)  Description: Assess:  -Perform Harshal assessment every shift  -Clean and moisturize skin every shift   -Inspect skin when repositioning, toileting, and assisting with ADLS  -Assess under medical devices such as masimo every shift   -Assess extremities for adequate circulation and sensation     Bed Management:  -Have minimal linens on bed & keep smooth, unwrinkled  -Change linens as needed when moist or perspiring  -Avoid sitting or lying in one position for more than 2 hours while in bed  -Keep HOB at 30 degrees     Toileting:  -Offer bedside commode  -Assess for incontinence every shift   -Use incontinent care products after each incontinent episode such as remedy    Activity:  -Mobilize patient 3 times a day  -Encourage activity and walks on unit  -Encourage or provide ROM exercises   -Turn and reposition patient every 2 Hours  -Use appropriate equipment to lift or move patient in bed  -Instruct/ Assist with weight shifting every 30 minutes  when out of bed in chair  -Consider limitation of chair time 2 hour intervals    Skin Care:  -Avoid use of baby powder, tape, friction and shearing, hot  water or constrictive clothing  -Relieve pressure over bony prominences using wedges and pillows   -Do not massage red bony areas    Next Steps:  -Teach patient strategies to minimize risks such as weight shifting    -Consider consults to  interdisciplinary teams   Outcome: Adequate for Discharge  Goal: Incision(s), wounds(s) or drain site(s) healing without S/S of infection  Description: INTERVENTIONS  - Assess and document dressing, incision, wound bed, drain sites and surrounding tissue  - Provide patient and family education  - Perform skin care/dressing changes every 2 hours  Outcome: Adequate for Discharge  Goal: Pressure injury heals and does not worsen  Description: Interventions:  - Implement low air loss mattress or specialty surface (Criteria met)  - Apply silicone foam dressing  - Instruct/assist with weight shifting every 120 minutes when in chair   - Limit chair time to 2 hour intervals  - Use special pressure reducing interventions such as waffle cushions when in chair   - Apply fecal or urinary incontinence containment device   - Perform passive or active ROM every 2 hours  - Turn and reposition patient & offload bony prominences every 2 hours   - Utilize friction reducing device or surface for transfers   - Consider nutrition services referral as needed  Outcome: Adequate for Discharge     Problem: MUSCULOSKELETAL - ADULT  Goal: Maintain or return mobility to safest level of function  Description: INTERVENTIONS:  - Assess patient's ability to carry out ADLs; assess patient's baseline for ADL function and identify physical deficits which impact ability to perform ADLs (bathing, care of mouth/teeth, toileting, grooming, dressing, etc.)  - Assess/evaluate cause of self-care deficits   - Assess range of motion  - Assess patient's mobility  - Assess patient's need for assistive devices and provide as appropriate  - Encourage maximum independence but intervene and supervise when necessary  - Involve  family in performance of ADLs  - Assess for home care needs following discharge   - Consider OT consult to assist with ADL evaluation and planning for discharge  - Provide patient education as appropriate  Outcome: Adequate for Discharge  Goal: Maintain proper alignment of affected body part  Description: INTERVENTIONS:  - Support, maintain and protect limb and body alignment  - Provide patient/ family with appropriate education  Outcome: Adequate for Discharge

## 2024-11-03 NOTE — ASSESSMENT & PLAN NOTE
Wt Readings from Last 3 Encounters:   11/03/24 97.3 kg (214 lb 9.6 oz)   10/28/24 98.9 kg (218 lb 0.6 oz)   10/09/24 100 kg (221 lb 5.5 oz)     ECHO 8/2023: EF 30-35%, systolic function severely reduced, diastolic function abnormal consistent with grade I relaxation  Home medication Lasix 40 mg daily, Toprol XL 25 mg daily  Follow up outpatient PCP one to two weeks post discharge

## 2024-11-03 NOTE — ASSESSMENT & PLAN NOTE
Patient with metastatic prostate cancer  Currently on chemotherapy with Jevtana every 3 weeks  Continue prednisone  Follows with Dr. Hernandez, [facility] oncology  Patient reports he was to have Jevtana last Wednesday; missed due to hospitalization  Resume with Dr. Hernandez on discharge; advised patient to call office on Monday, he verbalized understanding.  Message also sent to Dr. Hernandez for close follow-up.

## 2024-11-03 NOTE — ASSESSMENT & PLAN NOTE
Continue aspirin, statin, plavix, and beta-blocker therapy  Denies chest pain   Follow-up outpatient PCP 1 to 2 weeks postdischarge

## 2024-11-03 NOTE — ASSESSMENT & PLAN NOTE
History of peripheral artery disease s/p prior stenting and angioplasty.  He has had endarterectomy and profundoplasty by Dr. Cook in July 2021  Lower extremity arterial duplex demonstrating high-grade stenosis versus occlusion in the proximal mid and distal superficial femoral artery  Vascular surgery and IR consulted  S/p angiogram 10/30; occluded right SFA recanalized; right anterior tibial, posterior tibial, and peroneal arteries showed multifocal occlusions with no distal target present for revascularization  Continue aspirin, plavix, statin  Close follow-up with outpatient vascular and podiatry  Patient discharged to home with VNA services

## 2024-11-03 NOTE — ASSESSMENT & PLAN NOTE
Patient originally presented to [facility] with worsening wound on right great toe since hitting it recently.  XR R foot suspicious for osteomyelitis of great toe  Wound culture grew pseudomonas  Was on cefepime and vancomycin; transition to oral Cipro on discharge   Podiatry and vascular surgery consulted  S/p Angiogram on 10/30 with recanalization of right SFA, no distal target present for distal revascularization  Discussed with vascular; indicated patient is not a candidate for conventional recannulization; recommending following up outpatient with Dr. Harper for vein mapping and possible Limb Omar intervention  Repeat XR R foot shows no signs of osteomyelitis  Ceretec scan ordered per podiatry; shows focal increased radiotracer uptake at the right first toe distally which persist on delayed imaging suspicious for infection and osteomyelitis, additional mild areas of superficial activity at the bilateral feet and superior to the right ankle probably related to superficial skin infection as per radiology report.  Discussed with podiatry; would be okay for discharge on oral abx and referral to wound care center  Spoke with patient; he has barriers to getting to wound care; working with case management to see if we can get VNA for patient to do wound care  Home VNA arranged via case management, will do dressing change prior to discharge today and discharge patient to home  Close follow-up with outpatient vascular and podiatry  Follow-up outpatient PCP 1 to 2 weeks postdischarge

## 2024-11-03 NOTE — ASSESSMENT & PLAN NOTE
Podiatry following  Ceretec scan demonstrated osteomyelitis right great toe  Plan as outlined above  Close follow-up outpatient vascular and podiatry

## 2024-11-03 NOTE — ASSESSMENT & PLAN NOTE
Wound noted; podiatry following  Ceretec scan completed demonstrating osteomyelitis right first toe  Vascular also following as noted above   Local wound care; case management arranged for VNA to provide home wound care  Surgical shoes  Close follow-up with outpatient vascular and podiatry

## 2024-11-03 NOTE — ASSESSMENT & PLAN NOTE
Continue supportive care  Patient's son is primary caregiver  Follow-up PCP 1 to 2 weeks postdischarge

## 2024-11-03 NOTE — PROGRESS NOTES
Royce Rene is a 78 y.o. male who is currently ordered Vancomycin IV with management by the Pharmacy Consult service.  Relevant clinical data and objective / subjective history reviewed.  Vancomycin Assessment:  Indication and Goal AUC/Trough: Soft tissue (goal -600, trough >10), -600, trough >10  Clinical Status: stable  Renal Function:  SCr: 0.74 mg/dL  CrCl: 90.1 mL/min  Renal replacement: Not on dialysis  Days of Therapy: 13  Current Dose: 750mg IV q12h  Vancomycin Plan:  New Dosinmg IV q12h  Estimated AUC: 514 mcg*hr/mL  Estimated Trough: 15.6 mcg/mL  Next Level: 24 @0600  Renal Function Monitoring: Daily BMP and UOP  Pharmacy will continue to follow closely for s/sx of nephrotoxicity, infusion reactions and appropriateness of therapy.  BMP and CBC will be ordered per protocol. We will continue to follow the patient’s culture results and clinical progress daily.    Do Sravanthi Childs, Pharmacist

## 2024-11-03 NOTE — PLAN OF CARE
Problem: PAIN - ADULT  Goal: Verbalizes/displays adequate comfort level or baseline comfort level  Description: Interventions:  - Encourage patient to monitor pain and request assistance  - Assess pain using appropriate pain scale  - Administer analgesics based on type and severity of pain and evaluate response  - Implement non-pharmacological measures as appropriate and evaluate response  - Consider cultural and social influences on pain and pain management  - Notify physician/advanced practitioner if interventions unsuccessful or patient reports new pain  Outcome: Progressing     Problem: INFECTION - ADULT  Goal: Absence or prevention of progression during hospitalization  Description: INTERVENTIONS:  - Assess and monitor for signs and symptoms of infection  - Monitor lab/diagnostic results  - Monitor all insertion sites, i.e. indwelling lines, tubes, and drains  - Monitor endotracheal if appropriate and nasal secretions for changes in amount and color  - Luna appropriate cooling/warming therapies per order  - Administer medications as ordered  - Instruct and encourage patient and family to use good hand hygiene technique  - Identify and instruct in appropriate isolation precautions for identified infection/condition  Outcome: Progressing     Problem: SAFETY ADULT  Goal: Patient will remain free of falls  Description: INTERVENTIONS:  - Educate patient/family on patient safety including physical limitations  - Instruct patient to call for assistance with activity   - Consult OT/PT to assist with strengthening/mobility   - Keep Call bell within reach  - Keep bed low and locked with side rails adjusted as appropriate  - Keep care items and personal belongings within reach  - Initiate and maintain comfort rounds  - Make Fall Risk Sign visible to staff  - Offer Toileting every 2 Hours, in advance of need  - Initiate/Maintain bed alarm  - Obtain necessary fall risk management equipment: yes  - Apply yellow socks  and bracelet for high fall risk patients  - Consider moving patient to room near nurses station  Outcome: Progressing  Goal: Maintain or return to baseline ADL function  Description: INTERVENTIONS:  -  Assess patient's ability to carry out ADLs; assess patient's baseline for ADL function and identify physical deficits which impact ability to perform ADLs (bathing, care of mouth/teeth, toileting, grooming, dressing, etc.)  - Assess/evaluate cause of self-care deficits   - Assess range of motion  - Assess patient's mobility; develop plan if impaired  - Assess patient's need for assistive devices and provide as appropriate  - Encourage maximum independence but intervene and supervise when necessary  - Involve family in performance of ADLs  - Assess for home care needs following discharge   - Consider OT consult to assist with ADL evaluation and planning for discharge  - Provide patient education as appropriate  Outcome: Progressing  Goal: Maintains/Returns to pre admission functional level  Description: INTERVENTIONS:  - Perform AM-PAC 6 Click Basic Mobility/ Daily Activity assessment daily.  - Set and communicate daily mobility goal to care team and patient/family/caregiver.   - Collaborate with rehabilitation services on mobility goals if consulted  - Perform Range of Motion 3 times a day.  - Reposition patient every 2 hours.  - Dangle patient 3 times a day  - Stand patient 3 times a day  - Ambulate patient 3 times a day  - Out of bed to chair 3 times a day   - Out of bed for meals 3 times a day  - Out of bed for toileting  - Record patient progress and toleration of activity level   Outcome: Progressing     Problem: DISCHARGE PLANNING  Goal: Discharge to home or other facility with appropriate resources  Description: INTERVENTIONS:  - Identify barriers to discharge w/patient and caregiver  - Arrange for needed discharge resources and transportation as appropriate  - Identify discharge learning needs (meds, wound  care, etc.)  - Arrange for interpretive services to assist at discharge as needed  - Refer to Case Management Department for coordinating discharge planning if the patient needs post-hospital services based on physician/advanced practitioner order or complex needs related to functional status, cognitive ability, or social support system  Outcome: Progressing     Problem: Knowledge Deficit  Goal: Patient/family/caregiver demonstrates understanding of disease process, treatment plan, medications, and discharge instructions  Description: Complete learning assessment and assess knowledge base.  Interventions:  - Provide teaching at level of understanding  - Provide teaching via preferred learning methods  Outcome: Progressing     Problem: NEUROSENSORY - ADULT  Goal: Achieves stable or improved neurological status  Description: INTERVENTIONS  - Monitor and report changes in neurological status  - Monitor vital signs such as temperature, blood pressure, glucose, and any other labs ordered   - Initiate measures to prevent increased intracranial pressure  - Monitor for seizure activity and implement precautions if appropriate      Outcome: Progressing  Goal: Achieves maximal functionality and self care  Description: INTERVENTIONS  - Monitor swallowing and airway patency with patient fatigue and changes in neurological status  - Encourage and assist patient to increase activity and self care.   - Encourage visually impaired, hearing impaired and aphasic patients to use assistive/communication devices  Outcome: Progressing     Problem: METABOLIC, FLUID AND ELECTROLYTES - ADULT  Goal: Electrolytes maintained within normal limits  Description: INTERVENTIONS:  - Monitor labs and assess patient for signs and symptoms of electrolyte imbalances  - Administer electrolyte replacement as ordered  - Monitor response to electrolyte replacements, including repeat lab results as appropriate  - Instruct patient on fluid and nutrition as  appropriate  Outcome: Progressing  Goal: Fluid balance maintained  Description: INTERVENTIONS:  - Monitor labs   - Monitor I/O and WT  - Instruct patient on fluid and nutrition as appropriate  - Assess for signs & symptoms of volume excess or deficit  Outcome: Progressing  Goal: Glucose maintained within target range  Description: INTERVENTIONS:  - Monitor Blood Glucose as ordered  - Assess for signs and symptoms of hyperglycemia and hypoglycemia  - Administer ordered medications to maintain glucose within target range  - Assess nutritional intake and initiate nutrition service referral as needed  Outcome: Progressing     Problem: SKIN/TISSUE INTEGRITY - ADULT  Goal: Skin Integrity remains intact(Skin Breakdown Prevention)  Description: Assess:  -Perform Harshal assessment every shift  -Clean and moisturize skin every shift   -Inspect skin when repositioning, toileting, and assisting with ADLS  -Assess under medical devices such as masimo every shift   -Assess extremities for adequate circulation and sensation     Bed Management:  -Have minimal linens on bed & keep smooth, unwrinkled  -Change linens as needed when moist or perspiring  -Avoid sitting or lying in one position for more than 2 hours while in bed  -Keep HOB at 30 degrees     Toileting:  -Offer bedside commode  -Assess for incontinence every shift   -Use incontinent care products after each incontinent episode such as remedy    Activity:  -Mobilize patient 3 times a day  -Encourage activity and walks on unit  -Encourage or provide ROM exercises   -Turn and reposition patient every 2 Hours  -Use appropriate equipment to lift or move patient in bed  -Instruct/ Assist with weight shifting every 30 minutes  when out of bed in chair  -Consider limitation of chair time 2 hour intervals    Skin Care:  -Avoid use of baby powder, tape, friction and shearing, hot water or constrictive clothing  -Relieve pressure over bony prominences using wedges and pillows   -Do  not massage red bony areas    Next Steps:  -Teach patient strategies to minimize risks such as weight shifting    -Consider consults to  interdisciplinary teams   Outcome: Progressing  Goal: Incision(s), wounds(s) or drain site(s) healing without S/S of infection  Description: INTERVENTIONS  - Assess and document dressing, incision, wound bed, drain sites and surrounding tissue  - Provide patient and family education  - Perform skin care/dressing changes every 2 hours  Outcome: Progressing  Goal: Pressure injury heals and does not worsen  Description: Interventions:  - Implement low air loss mattress or specialty surface (Criteria met)  - Apply silicone foam dressing  - Instruct/assist with weight shifting every 120 minutes when in chair   - Limit chair time to 2 hour intervals  - Use special pressure reducing interventions such as waffle cushions when in chair   - Apply fecal or urinary incontinence containment device   - Perform passive or active ROM every 2 hours  - Turn and reposition patient & offload bony prominences every 2 hours   - Utilize friction reducing device or surface for transfers   - Consider nutrition services referral as needed  Outcome: Progressing     Problem: MUSCULOSKELETAL - ADULT  Goal: Maintain or return mobility to safest level of function  Description: INTERVENTIONS:  - Assess patient's ability to carry out ADLs; assess patient's baseline for ADL function and identify physical deficits which impact ability to perform ADLs (bathing, care of mouth/teeth, toileting, grooming, dressing, etc.)  - Assess/evaluate cause of self-care deficits   - Assess range of motion  - Assess patient's mobility  - Assess patient's need for assistive devices and provide as appropriate  - Encourage maximum independence but intervene and supervise when necessary  - Involve family in performance of ADLs  - Assess for home care needs following discharge   - Consider OT consult to assist with ADL evaluation and  planning for discharge  - Provide patient education as appropriate  Outcome: Progressing  Goal: Maintain proper alignment of affected body part  Description: INTERVENTIONS:  - Support, maintain and protect limb and body alignment  - Provide patient/ family with appropriate education  Outcome: Progressing

## 2024-11-03 NOTE — NURSING NOTE
AVS printed and reviewed with the patient and his son at the bedside. Opportunity for questions was provided. The patient is discharged to home with VNA referral for wound care. The patient is in stable condition.

## 2024-11-03 NOTE — DISCHARGE SUMMARY
Discharge Summary - Hospitalist   Name: Royce Rene 78 y.o. male I MRN: [id number]  Unit/Bed#: [address] [place] [address]-02 I Date of Admission: 10/28/2024   Date of Service: 11/3/2024 I Hospital Day: 6     Assessment & Plan  Osteomyelitis ([facility])  Patient originally presented to [facility] with worsening wound on right great toe since hitting it recently.  XR R foot suspicious for osteomyelitis of great toe  Wound culture grew pseudomonas  Was on cefepime and vancomycin; transition to oral Cipro on discharge   Podiatry and vascular surgery consulted  S/p Angiogram on 10/30 with recanalization of right SFA, no distal target present for distal revascularization  Discussed with vascular; indicated patient is not a candidate for conventional recannulization; recommending following up outpatient with Dr. Harper for vein mapping and possible Limb Omar intervention  Repeat XR R foot shows no signs of osteomyelitis  Ceretec scan ordered per podiatry; shows focal increased radiotracer uptake at the right first toe distally which persist on delayed imaging suspicious for infection and osteomyelitis, additional mild areas of superficial activity at the bilateral feet and superior to the right ankle probably related to superficial skin infection as per radiology report.  Discussed with podiatry; would be okay for discharge on oral abx and referral to wound care center  Spoke with patient; he has barriers to getting to wound care; working with case management to see if we can get VNA for patient to do wound care  Home VNA arranged via case management, will do dressing change prior to discharge today and discharge patient to home  Close follow-up with outpatient vascular and podiatry  Follow-up outpatient PCP 1 to 2 weeks postdischarge  PAD (peripheral artery disease) ([facility])  History of peripheral artery disease s/p prior stenting and angioplasty.  He has had endarterectomy and profundoplasty by Dr. Cook in July 2021  Lower extremity  arterial duplex demonstrating high-grade stenosis versus occlusion in the proximal mid and distal superficial femoral artery  Vascular surgery and IR consulted  S/p angiogram 10/30; occluded right SFA recanalized; right anterior tibial, posterior tibial, and peroneal arteries showed multifocal occlusions with no distal target present for revascularization  Continue aspirin, plavix, statin  Close follow-up with outpatient vascular and podiatry  Patient discharged to home with VNA services  Chronic combined systolic and diastolic CHF (congestive heart failure) ([facility])  Wt Readings from Last 3 Encounters:   11/03/24 97.3 kg (214 lb 9.6 oz)   10/28/24 98.9 kg (218 lb 0.6 oz)   10/09/24 100 kg (221 lb 5.5 oz)     ECHO 8/2023: EF 30-35%, systolic function severely reduced, diastolic function abnormal consistent with grade I relaxation  Home medication Lasix 40 mg daily, Toprol XL 25 mg daily  Follow up outpatient PCP one to two weeks post discharge   CAD (coronary artery disease)  Continue aspirin, statin, plavix, and beta-blocker therapy  Denies chest pain   Follow-up outpatient PCP 1 to 2 weeks postdischarge  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 Hernandez, [facility] oncology  Patient reports he was to have Jevtana last Wednesday; missed due to hospitalization  Resume with Dr. Hernandez on discharge; advised patient to call office on Monday, he verbalized understanding.  Message also sent to Dr. Hernandez for close follow-up.  Moderate vascular dementia (HCC)  Continue supportive care  Patient's son is primary caregiver  Follow-up PCP 1 to 2 weeks postdischarge  Ischemic pain of foot at rest  Patient reports intermittent foot pain   As needed extra strength tylenol  Foot elevation   Ulcer of right foot with fat layer exposed (HCC)  Wound noted; podiatry following  Ceretec scan completed  demonstrating osteomyelitis right first toe  Vascular also following as noted above   Local wound care; case management arranged for VNA to provide home wound care  Surgical shoes  Close follow-up with outpatient vascular and podiatry  Ulcer of left foot, limited to breakdown of skin (HCC)  Local wound care  Home VNA arranged via case management for wound care  Close follow-up with outpatient vascular and podiatry    Dry gangrene (HCC)  Podiatry following  Ceretec scan demonstrated osteomyelitis right great toe  Plan as outlined above  Close follow-up outpatient vascular and podiatry     Medical Problems       Resolved Problems  Date Reviewed: 11/3/2024            Resolved    Acute on chronic diastolic CHF (congestive heart failure) (HCC) 10/28/2024     Resolved by  Vini Cano DO        Discharging Physician / Practitioner: OMAIRA Hamm  PCP: Anne Chandra MD  Admission Date:   Admission Orders (From admission, onward)       Ordered        10/28/24 1243  INPATIENT ADMISSION  Once                          Discharge Date: 11/03/24    Consultations During Hospital Stay:  Podiatry  Vascular  PT/OT    Procedures Performed:   IR lower extremity angiogram performed on 10/30/2024 showing abdominal aorta and bilateral iliofemoral arteries were patent, occluded right SFA recannulized followed by mechanical thrombectomy and 5 mm and 6 mm balloon angioplasties, dissection was present at the distal SFA following angioplasty.  Additional 6 mm x 60 mm Bernadine eluding stent placed in distal SFA overlapping pre-existing stent, focal nonflow limiting stenosis in the proximal right popliteal artery, remainder of popliteal artery was patent, multifocal occlusions of right anterior tibial, posterior tibial and peroneal arteries without distal target for revascularization as per radiology report.    Significant Findings / Test Results:   X-ray of right foot performed 10/30/2024 showed no radiographic signs  of osteomyelitis in the hallux, no acute osseous abnormality as per radiology report.  Ceretec scan performed on 11/2/2024 showed focal increased radiotracer uptake at the right first toe distally which persist on delayed imaging suspicious for infection and osteomyelitis, additional mild areas of superficial activity at the bilateral feet and superior to the right ankle probably related to superficial skin infection as per radiology report.    Incidental Findings:   Ceretec scan as noted above  I reviewed the above mentioned incidental findings with the patient and/or family and they expressed understanding.    Test Results Pending at Discharge (will require follow up):   None     Outpatient Tests Requested:  Close follow-up with outpatient vascular for evaluation for Limflow procedure    Complications:  None     Reason for Admission: Lower extremity wounds    Hospital Course:   Royce Rene is a 78 y.o. male patient past medical history of prostate cancer undergoing treatment with hematology oncology, CAD status post stenting, heart failure, PAD, vascular dementia who originally presented to the hospital on 10/28/2024 due to transfer from [facility] to [facility] for angiogram which was performed on 10/30/2024, which demonstrated occluded right SFA which was recanalized followed by mechanical thrombectomy and 5 mm and 6 mm balloon angioplasties, dissection present at the distal SFA following angioplasty.  Additional 6 mm x 60 mm Bernadine drug-eluting stent placed in distal SFA overlapping pre-existing stent, multifocal occlusions of right anterior tibial, posterior tibial and peroneal arteries without distal target for revascularization per radiology report.  Case was discussed with vascular surgery, indicated that patient was not a candidate for conventional recannulization and recommend that patient follow-up outpatient with Dr. Harper to be evaluated for limb flow procedure.  Ceretec scan was performed which  did demonstrate osteomyelitis in the right great toe.  The patient was on IV cefepime and vancomycin, wound culture demonstrated Pseudomonas aeruginosa's and patient will be transition to oral Cipro based upon sensitivities.  Patient will continue on oral Cipro as source control has not been attained at this point.  He will follow-up closely with outpatient vascular with the hopes of optimizing healing potential.  He will also follow-up closely with outpatient podiatry.  Patient indicated that he missed his chemotherapy last Wednesday as he was in the hospital, he is advised to call the oncology office on Monday to determine when his next chemotherapy should be.  Message also sent to Dr. Hernandez for follow-up.  Patient is advised to follow-up with his PCP 1 to 2 weeks postdischarge.  We did discuss that his Ceretec scan demonstrating the osteomyelitis in the right great toe, he verbalized an understanding in the next steps of following up with vascular and with podiatry.  The patient indicated he had barriers to transportation to getting to wound care center, discussed with case management and home VNA services are set up for wound care in the home.  Patient will be discharged to home today.          Please see above list of diagnoses and related plan for additional information.     Condition at Discharge: good    Discharge Day Visit / Exam:   Subjective:      Patient seen sitting up in bed resting comfortably.  He reports that he slept well last night, minimal pain.  Is hopeful that he will be able to go home today.  We discussed the findings of his Ceretec scan, discussed how the infection is in the bone and most likely will be need to be amputated after he has procedure to optimize his blood flow.  We discussed him going home on oral Cipro for the infection, and the addition of Trental to help optimize blood flow also.  He did verbalize an understanding.  Denies any fevers or chills, good appetite no nausea or  "vomiting.  No further diarrhea, does not feel constipated.      Vitals: Blood Pressure: 142/82 (11/03/24 0852)  Pulse: 80 (11/03/24 0852)  Temperature: 98.1 °F (36.7 °C) (11/03/24 0852)  Temp Source: Oral (11/03/24 0852)  Respirations: 18 (11/03/24 0852)  Height: 5' 7\" (170.2 cm) (10/28/24 1213)  Weight - Scale: 97.3 kg (214 lb 9.6 oz) (11/03/24 0600)  SpO2: 97 % (11/03/24 0852)    Physical Exam  Vitals and nursing note reviewed.   Constitutional:       Comments: Pale, chronically ill appearing gentleman resting comfortably.   HENT:      Head: Normocephalic.      Nose: Nose normal.      Mouth/Throat:      Mouth: Mucous membranes are moist.   Eyes:      Extraocular Movements: Extraocular movements intact.      Conjunctiva/sclera: Conjunctivae normal.      Pupils: Pupils are equal, round, and reactive to light.   Cardiovascular:      Rate and Rhythm: Normal rate and regular rhythm.      Pulses: Normal pulses.   Pulmonary:      Effort: Pulmonary effort is normal.      Breath sounds: Normal breath sounds.   Abdominal:      General: Bowel sounds are normal. There is no distension.      Palpations: Abdomen is soft.      Tenderness: There is no abdominal tenderness.   Genitourinary:     Comments: Voiding spontaneously  Musculoskeletal:         General: Normal range of motion.      Cervical back: Normal range of motion.   Skin:     Capillary Refill: Capillary refill takes less than 2 seconds.      Comments: Bilateral LE dressings present, CDI   Neurological:      General: No focal deficit present.      Mental Status: He is alert and oriented to person, place, and time.   Psychiatric:         Mood and Affect: Mood normal.         Behavior: Behavior normal.         Thought Content: Thought content normal.         Judgment: Judgment normal.          Discussion with Family: Attempted to update  (son) via phone. Left voicemail.     Discharge instructions/Information to patient and family:   See after visit summary " for information provided to patient and family.      Provisions for Follow-Up Care:  See after visit summary for information related to follow-up care and any pertinent home health orders.      Mobility at time of Discharge:   Basic Mobility Inpatient Raw Score: 17  JH-HLM Goal: 5: Stand one or more mins  JH-HLM Achieved: 4: Move to chair/commode  HLM Goal NOT achieved. Continue to encourage mobility in post discharge setting.     Disposition:   Home with VNA Services (Reminder: Complete face to face encounter)    Planned Readmission: No    Discharge Medications:  See after visit summary for reconciled discharge medications provided to patient and/or family.      Administrative Statements   Discharge Statement:  I have spent a total time of greater than 45 minutes in caring for this patient on the day of the visit/encounter. >30 minutes of time was spent on: Diagnostic results, Counseling / Coordination of care, Documenting in the medical record, Reviewing / ordering tests, medicine, procedures  , and Communicating with other healthcare professionals .    **Please Note: This note may have been constructed using a voice recognition system**

## 2024-11-03 NOTE — INCIDENTAL FINDINGS
The following findings require follow up:  Radiographic finding   Finding: Ceretec white blood cell study performed on 11/2/2024 showed focal increased radiotracer uptake at the right first toe distally which persist on delayed imaging suspicious for infection osteomyelitis, additional mild areas of superficial activity at the bilateral feet and superior to the right ankle probably related to superficial skin infection as per radiology report.   Follow up required: Continue to follow with podiatry, vascular   Follow up should be done within follow-up with vascular outpatient Dr. Harper to be evaluated for LimFlo procedure,  continue to follow with Dr. Montes podiatry     Please notify the following clinician to assist with the follow up:   Dr. Harper and Dr. Montes    Incidental finding results were discussed with the Patient by OMAIRA Hamm on 11/03/24.   They expressed understanding and all questions answered.

## 2024-11-04 ENCOUNTER — HOME CARE VISIT (OUTPATIENT)
Dept: HOME HEALTH SERVICES | Facility: HOME HEALTHCARE | Age: 79
End: 2024-11-04
Payer: COMMERCIAL

## 2024-11-04 ENCOUNTER — TELEPHONE (OUTPATIENT)
Age: 79
End: 2024-11-04

## 2024-11-04 ENCOUNTER — TRANSITIONAL CARE MANAGEMENT (OUTPATIENT)
Dept: INTERNAL MEDICINE CLINIC | Facility: CLINIC | Age: 79
End: 2024-11-04

## 2024-11-04 ENCOUNTER — OFFICE VISIT (OUTPATIENT)
Dept: PALLIATIVE MEDICINE | Facility: CLINIC | Age: 79
End: 2024-11-04
Payer: COMMERCIAL

## 2024-11-04 ENCOUNTER — CLINICAL SUPPORT (OUTPATIENT)
Dept: PALLIATIVE MEDICINE | Facility: CLINIC | Age: 79
End: 2024-11-04

## 2024-11-04 VITALS
SYSTOLIC BLOOD PRESSURE: 130 MMHG | WEIGHT: 225.97 LBS | HEART RATE: 91 BPM | OXYGEN SATURATION: 98 % | BODY MASS INDEX: 35.39 KG/M2 | RESPIRATION RATE: 18 BRPM | TEMPERATURE: 97.6 F | DIASTOLIC BLOOD PRESSURE: 70 MMHG

## 2024-11-04 DIAGNOSIS — I96 DRY GANGRENE (HCC): Primary | ICD-10-CM

## 2024-11-04 DIAGNOSIS — Z71.89 COUNSELING AND COORDINATION OF CARE: Primary | ICD-10-CM

## 2024-11-04 PROCEDURE — NC001 PR NO CHARGE

## 2024-11-04 PROCEDURE — 99214 OFFICE O/P EST MOD 30 MIN: CPT | Performed by: FAMILY MEDICINE

## 2024-11-04 PROCEDURE — G2211 COMPLEX E/M VISIT ADD ON: HCPCS | Performed by: FAMILY MEDICINE

## 2024-11-04 RX ORDER — OXYCODONE HYDROCHLORIDE 5 MG/1
5 TABLET ORAL 2 TIMES DAILY PRN
Qty: 60 TABLET | Refills: 0 | Status: SHIPPED | OUTPATIENT
Start: 2024-11-04

## 2024-11-04 NOTE — PROGRESS NOTES
Follow-up Ambulatory Visit  Name: Royce Rene      : 1945      MRN: [id number]  Encounter Provider: Gavin Andrew MD  Encounter Date: 2024   Encounter department: [facility] PALLIATIVE CARE [place]    Assessment & Plan  Dry gangrene (HCC)    Orders:    oxyCODONE (Roxicodone) 5 immediate release tablet; Take 1 tablet (5 mg total) by mouth 2 (two) times a day as needed (wound pain) Max Daily Amount: 10 mg      Narrative rationale for today's treatments:  - No changes to non-controlled meds  - Retrial opioids; #60 tabs for 30 days.  No refills; must visit or call for any adjustments.  - Pt continues to have limited ability to coordinate cares and prevent harms from his overall level of illness.              = Podiatry identified hygiene concerns, and echoed our worries for the pt's inability to coordinate basic self-cares.               = Pt must be seen by Vascular; our SW team will assist with appt set up.     PDMP Review: I have reviewed the patient's controlled substance dispensing history in the Prescription Drug Monitoring Program in compliance with the [facility] regulations before prescribing any controlled substances.  .  Gavin Andrew MD  Palliative and Supportive Care  Clinic/Answering Service: [phone]  You can find me on [employer] Secure Chat!       History of Present Illness     Royce Rene is a 78 y.o. male who presents  in follow up of symptoms related to Stage IV prostate cancer, hormone treatment resistant.  He follows with Dr. Hernandez and Ms Philip for this.  There is also a significant cardiovascular history, with heart failure management by Dr. Trejo, and PAD with distal pain at rest in both feet.  He has an ICD for a h/o VT, and underwent TAVR in 2021.  Pertinent issues include: symptom management, resource management.       Since last visit, pt injured his feet, went to hospital, and was Xferred to [facility] for VascSurg intervention to attempt to revascularize his feet.  These more or  "less failed, as his distal circulation is so poor as to be ineffective.  He reports that he has been advised to consider amputation, but I cannot find this recommendation in his notes.    Otherwise, hospital records indicate that he was using 1-2 tabs of oxyIR 5mg per day towards the end of his hospital stay.  He requests that we continue this for now, given the ongoing ischemic pain and injury to the feet.  Balancing benefits against risks, we will trial one time a provision of #60 tabs oxyIR.    Pt does note that he needs Vascular f/up, and is able to work with us today to think on how to make this happen, despite his tech literacy challenges.  His recall of his overall health condition does seem more insightful.      In Sept 2024, pt reports that he cannot read labels, and he cannot get ahold of his son, despite the fact that son lives in the same building.  Pt also reports that the son does not even know the meds, nor request to be a part of his father's care.  It seems, per the pt's report, that he will receive meds in the mail and just guess as to how they might be used.      In July 2024, we noted:    \"The pt did show up for Podiatry visit, and this provider was able to confirm that the pt's care of his feet is inadequate for basic hygiene, wound cares, and general safety.  He was advised to return for repeat assessment and cares in 3 weeks.  Since that visit on 6/27, it appears that he has not been in to see any providers prior to today.  Importantly, he NSH a visit with [facility] and Pain due to a prejudice against the AP.       Pt also state that Meals on Wheels is not appetizing, and he might prefer Grandma's meals.  He is not able to say where he got this info, how to dial Grandma's, or how to reach his .      Later, we learn that his previous  has retired from his case, and a new worker is on deck: David Grace, [facility]  - [phone].\"       There is a concern about brain " mets from his prostate ca, based on an equivocal finding on most recent CT w/ + w/o.  An MRI cannot be done to follow this lesion, d/t pacer placement.  A repeat CT is ordered instead, for July 18.       In May 2024, pt endorses a terrible fall while trying to change the garbage.  He reports that he laid on floor for several moments, unable to call his son, and he has worse pains after the fall.  No LOC.         In April 2024, pt reports that his son doesn't wish to pursue Waiver form as he does not wish to be beholden to his father for cares.    Pt reports that he has a niece, and a granddaughter, who may be helpful to him.  He is not sure how to include them in his cares; granddaughter is in [place].  Niece lives near [place].  He might consider speaking with his niece to support him in day-to-day cares, or special trips for medical appointments.       In March 2024, we noted that the [employer] worker has needed the pt to complete a FED assessment at [phone].  Unfortunately, this process was not completed as requested, and the case has been closed by the Agency.       In February 2024, pt reports that no one in his life will help him.  He reports that he doesn't have prednisone at home, despite Dr. eHrnandez has send 6mos of this med to pharmacy within last 2 mos.  Unclear if pt is having this med delivered; he will not ask his son for help, as son has two jobs.  We have been informed that pt's son will not answer phone before 2pm daily.  Pt's daughter Kush has never been helpful in care coordination in our experience, reportedly, she has signed off his case for any contact or care.  Pt's last son Royce Bermeo lives in [place], and has not expressed any interest in providing assistance nor support.     - Vision challenge -  He has fogginess in the far vision, and cannot read effectively.  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    - Heart failure - Advised by Cards to  use 2g Na / 2L H2O diet, as well as daily wts.  Pt does not have ability to read labels to follow this rec  - Vascular health - 4/26/23, pt saw Dr. Abbasi in office, and it seems that the plan ongoing is for DAPT life-long, and deferral of further cares to Podiatry.  - Care coordination - Pt needs [facility] referrals from [facility] [place] to continue cares with Onc, Palliative, and any other providers in Network.  Pt completely unable to manage this issue on his own; is unaware of his PCP, nor the systems involved in VA's gatekeeping to specialty care / 's Choice program.   - Oncology Care - Dr. Hernandez has been effectively managing pt's medical illness, as noted by sustained suppression of his PSA on repeat measurements on current therapies.  Ms Keating of [facility] has been helpful in some degree of care coordination re: pt's financial difficulties  - Vulnerable adult - He has had [facility] transport set up, but he reports [facility] would not take him home from treatments and tests.  Has Meals on Wheels weekdaily for midday meal only.  He can prep breakfast, but not any other meals.      Pt pays mortgage on his home, and has not added anyone to the deed/title.  He reports that this is due to GI bill provisions, and since Alec is not a , he cannot have the home through this mortgage.    He does not know how to get care thru VA system, though one of his VA provider told him he would need to see a specialist.   He also endorses losing items about his home, and a concern about his memory.  Chores about the house are nearly impossible because he cannot bend over, and he can't stand for more than about 3min at a time.     - Home DME, supports -                = Pt has stairglide/chairlift in home.  Confirmed that this was resolved and improved by home health RN 3/30/23.               = Transport - Relies on [facility] for all support.  Family unhelpful.  Pt's 's license revoked by State, pending medical evaluation and  "re-testing.       From our first visit on 3/1/22:    \"Pt has demonstrated -- over a year ago now -- progression of his illness (rising PSA and decreasing functional status) despite aggressive therapies and cytotoxic treatments.  His side effects vs decompensation in his overall illness was so severe he was admitted to [facility] in march 2021.  Since that time, he has recovered some function with careful management of his cardiac disease, and less aggressive, less toxic cancer cares.  At this time, his treatment plan involves Xofigo for bone-avid disease, +/- Lupron.  We note that the patient has demonstrated some disease progression on other hormonal blockers, but that this plan of care is being offered, if not rec'd, by Dr. Toledo.    Pt presents to our office with suboptimally controlled pain, ongoing wt loss, poor appetite, and general malaise.  His concerns are more related to his back and R foot pain; a Podiatrist today advised him that his unilateral neuropathic symptoms 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.\"       Objective     /70 (BP Location: Left arm, Patient Position: Sitting, Cuff Size: Large)   Pulse 91   Temp 97.6 °F (36.4 °C) (Temporal)   Resp 18   Wt 102 kg (225 lb 15.5 oz)   SpO2 98%   BMI 35.39 kg/m²     Physical Exam  Constitutional:       General: He is not in acute distress.     Appearance: He is not diaphoretic.      Comments: Frail   HENT:      Head: Normocephalic and atraumatic.      Right Ear: External ear normal.      Left Ear: External ear normal.   Eyes:      General:         Right eye: No discharge.         Left eye: No discharge.      Conjunctiva/sclera: Conjunctivae normal.      Pupils: Pupils are equal, round, and reactive to light.   Neck:      Trachea: No tracheal deviation.   Cardiovascular:      Rate and Rhythm: Regular rhythm. Tachycardia present.   Pulmonary:      Effort: Pulmonary effort is normal. No respiratory distress.      Breath sounds: No stridor. "   Abdominal:      Palpations: Abdomen is soft.      Comments: scaphoid   Skin:     General: Skin is warm and dry.      Coloration: Skin is pale.      Findings: No erythema or rash.   Neurological:      General: No focal deficit present.      Mental Status: He is oriented to person, place, and time. Mental status is at baseline.      Cranial Nerves: No cranial nerve deficit.   Psychiatric:         Mood and Affect: Mood normal.         Behavior: Behavior normal.         Thought Content: Thought content normal.      Comments: Judgment and insight improved         Recent labs: none new; reviwed preivous    Recent Imaging: reviewed previous study of brain, showing a stable, calcified lesion consistent with metastatic scar.  Interval scans show no changes over short period of time that would suggest metabolically active mets.      Administrative Statements   I have spent a total time of 35+ minutes in caring for this patient on the day of the visit/encounter including: chart review; symptom pursuit; supportive listening; anticipatory guidance.

## 2024-11-04 NOTE — PROGRESS NOTES
"Supportive Care  met with Patient Mr. Rene, to assist him with scheduling an appointment via his \"my chart\" and setting up transportation as needed.      Identified areas of need include: Mr. Rene needs to schedule a Vascular Surgery appointment, he will also need transportation to and from the appointment.     Resources provided: Sign in process for [facility]'s My Chart.     Mr. Rene has increasing difficulty seeing in general, more specifically, with reading due to a progressing visual impairment.  Finding information on his cell phone is particularly challenging.   Prior to assisting him with scheduling, SW assisted Mr Rene with adjusting some settings to increase font size, and romero icon size.  Once settings on his cell phone were adjusted to his benefit, Mr Rene directed SW to the text message directing him to schedule the appointment with Vascular Surgery.   This launched the \"My Chart\" romero asking for sign in. Mr Rene updated his sign-on information and SW assisted him with saving the information and setting the romero to keep him \"signed in\". Scheduling the appointment via My Chart was not conducive, as the first available date listed was two weeks from today. Mr Rene opted to call to schedule the appointment.   He led the call and made it clear that he had to be seen very soon. His requested was escalated. Mr Rene was informed that he would receive a call to schedule.   BRIDGET informed the  that Mr. Rene will require transportation to and from the appointment. An additional request for a roundtrip transport was added to the request.    Areas that need future follow-up include: Mr Rene would benefit from a Nurse Navigator    I have spent 20 minutes with Patient  today in which greater than 50% of this time was spent in counseling/coordination of care.     Palliative  will follow-up as requested by patient, family, and primary team.  Please contact with any specific requests.      "

## 2024-11-04 NOTE — TELEPHONE ENCOUNTER
Caller:  Royce Rene     Doctor:  Sebas Abbasi     Reason for call:  Patient calling in states he got a new referral for PAD and was told to make a new appt with us asap. Patient was seen for the same reason on 4/2023 and needs a follow up with Dr. Sebas Abbasi was unable to find a opening.     Patient also asked if transport  can be set up for him as well.    Call back#:  [phone]

## 2024-11-05 ENCOUNTER — TELEPHONE (OUTPATIENT)
Age: 79
End: 2024-11-05

## 2024-11-05 ENCOUNTER — HOME CARE VISIT (OUTPATIENT)
Dept: HOME HEALTH SERVICES | Facility: HOME HEALTHCARE | Age: 79
End: 2024-11-05
Payer: COMMERCIAL

## 2024-11-05 VITALS
OXYGEN SATURATION: 97 % | TEMPERATURE: 96.6 F | DIASTOLIC BLOOD PRESSURE: 84 MMHG | RESPIRATION RATE: 16 BRPM | SYSTOLIC BLOOD PRESSURE: 128 MMHG | HEART RATE: 83 BPM

## 2024-11-05 PROCEDURE — G0299 HHS/HOSPICE OF RN EA 15 MIN: HCPCS

## 2024-11-05 NOTE — TELEPHONE ENCOUNTER
Patient called rx refill line inquiring on being scheduled for surgery. Patient directed to the scheduling department. Understanding verbalized.

## 2024-11-05 NOTE — CASE COMMUNICATION
Medication discrepancies or Major drug interactions:  Patient is missing the following from bubble packs please call into [facility] [address] [facility] pentoxifylline, atorvastatin, loperamide, cetirizine, aspirin, and clopidogrel. Note from Dr. Andrew reports no more oxycodone but is still on medication list and was filled yesterday for 60 tablets.     Abnormal clinical findings:     Response needed, please respond  via In Basket  [facility] has Resumed your patient to Home Health service with the following disciplines: SN, PT, OT and MSW  Patient stated goals of care: Get foot healed and care for self.   Potential barriers to goal achievement: Pays for son to live in home.   Primary focus of home health care:Wound  Anticipated visit pattern and next visit date: 1D5 for WC to left groin then 3w2  Thank you for allowing us to participate in the  care of your patient.      Moe Bueno RN

## 2024-11-05 NOTE — ASSESSMENT & PLAN NOTE
Orders:    oxyCODONE (Roxicodone) 5 immediate release tablet; Take 1 tablet (5 mg total) by mouth 2 (two) times a day as needed (wound pain) Max Daily Amount: 10 mg

## 2024-11-05 NOTE — TELEPHONE ENCOUNTER
Moe with [facility] called patient fell going up on step, fell backwards and hit head and back. Patient denies LOC, did feel dizzy when it happened. Does not feel dizzy now, no change in vision, no headache. Does have a dollar size black and blue on right flank but no pain. Patient states for some reason railing felt slippery. Moe would like to know if patient Is to go to ER and get a CT of head. Please call patient if he needs to go

## 2024-11-05 NOTE — TELEPHONE ENCOUNTER
Call patient, ask if with any headache, dizziness, arm/leg weakness etc. May need to go to the ER.    Please try and schedule TCM, not seen since a year ago.

## 2024-11-05 NOTE — CASE COMMUNICATION
MANISHA note addendum.     Medication discrepancies or Major drug interactions: Patient is missing the following from bubble packs please call into [facility] [address] [facility] pentoxifylline, atorvastatin, loperamide, cetirizine, aspirin, and clopidogrel. Note from Dr. Andrew reports no more oxycodone but is still on medication list and was filled yesterday for 60 tablets.   Abnormal clinical findings: Secure chat to  Zachery Montes DPM obtained verbal orders for right 1st toe and left lateral foot wound to apply betadine wet to dry three times weekly. Poor vision hearing loss and need for upper and lower dentures. Patient reports nasal congestion that effects hearing.     TC to PCP Dr. Anne Chandra left message with Kathia patient reports flipping over and hitting head when coming home then felt dizzy, patient takes aspirin 81 mg daily and Gilmer vix 75 mg daily. Patient reports fell backwards off of first step of home hit back and head, son helped patient get up off floor. Patient has no signs of trama on head but dose have ecchymosis of right flank size of a dollar bill. Picture in Media. RN recommends patient go to ED but Kathia said to let Dr. Chandra make that decision. Patient has a dial scale that is too small to stand on for patient and is too small to see for patient. R N provided wide thin talking scale. patient able to use independently.    Patient reports son will assist with transportation to MD appointments. TC to patients son left VM reporting need to make appointment with [facility] dermatology and PCP within 2 weeks before patients certification period is over so VNA has orders to continue to see patient. Also notified of need to make appointment with vascular oncology and cardiology.  MSW ordered to  assist with transportation and long term planning. Patient would be willing to go to long term care but cannot due to pays for son to live in home.   Response needed, please respond via  In Basket   [facility] has Resumed your patient to Home Health service with the following disciplines: SN, PT  and MSW Patient reports independence with ADLS and refused OT.   Patient stated goals of care: Get foot healed and care for self.   Potenti al barriers to goal achievement: Pays for son to live in home.   Primary focus of home health care:Wound   Anticipated visit pattern and next visit date: 1D5 for WC to left groin then 3w2   Thank you for allowing us to participate in the care of your patient.   Moe Bueno RN

## 2024-11-05 NOTE — CASE COMMUNICATION
TC to PCP Dr. Anne Chandra left message with Kathia patient reports flipping over and hitting head when coming home then felt dizzy, patient takes aspirin 81 mg daily and Plavix 75 mg daily. Patient reports fell backwards off of first step of home hit back and head, son helped patient get up off floor. Patient has no signs of trama on head but dose have ecchymosis of right flank size of a dollar bill. Picture in Media. RN recommen ds patient go to ED but Kathia said to let Dr. Chandra make that decision.

## 2024-11-06 ENCOUNTER — TELEPHONE (OUTPATIENT)
Dept: HEMATOLOGY ONCOLOGY | Facility: CLINIC | Age: 79
End: 2024-11-06

## 2024-11-06 ENCOUNTER — TELEPHONE (OUTPATIENT)
Age: 79
End: 2024-11-06

## 2024-11-06 ENCOUNTER — HOME CARE VISIT (OUTPATIENT)
Dept: HOME HEALTH SERVICES | Facility: HOME HEALTHCARE | Age: 79
End: 2024-11-06
Payer: COMMERCIAL

## 2024-11-06 ENCOUNTER — OFFICE VISIT (OUTPATIENT)
Age: 79
End: 2024-11-06
Payer: COMMERCIAL

## 2024-11-06 VITALS — WEIGHT: 225 LBS | RESPIRATION RATE: 18 BRPM | BODY MASS INDEX: 35.31 KG/M2 | HEIGHT: 67 IN

## 2024-11-06 VITALS — HEART RATE: 79 BPM | RESPIRATION RATE: 18 BRPM | TEMPERATURE: 97.3 F | OXYGEN SATURATION: 97 %

## 2024-11-06 DIAGNOSIS — L97.514 DIABETIC ULCER OF TOE OF RIGHT FOOT ASSOCIATED WITH TYPE 2 DIABETES MELLITUS, WITH NECROSIS OF BONE (HCC): ICD-10-CM

## 2024-11-06 DIAGNOSIS — I73.9 PAD (PERIPHERAL ARTERY DISEASE) (HCC): ICD-10-CM

## 2024-11-06 DIAGNOSIS — I96 DRY GANGRENE (HCC): Primary | ICD-10-CM

## 2024-11-06 DIAGNOSIS — C61 PROSTATE CANCER (HCC): Primary | ICD-10-CM

## 2024-11-06 DIAGNOSIS — E11.42 DIABETIC POLYNEUROPATHY ASSOCIATED WITH TYPE 2 DIABETES MELLITUS (HCC): ICD-10-CM

## 2024-11-06 DIAGNOSIS — E11.621 DIABETIC ULCER OF TOE OF RIGHT FOOT ASSOCIATED WITH TYPE 2 DIABETES MELLITUS, WITH NECROSIS OF BONE (HCC): ICD-10-CM

## 2024-11-06 DIAGNOSIS — M86.171 OTHER ACUTE OSTEOMYELITIS OF RIGHT FOOT (HCC): ICD-10-CM

## 2024-11-06 DIAGNOSIS — L03.031 CELLULITIS OF TOE, RIGHT: ICD-10-CM

## 2024-11-06 PROCEDURE — 99214 OFFICE O/P EST MOD 30 MIN: CPT | Performed by: PODIATRIST

## 2024-11-06 PROCEDURE — G0299 HHS/HOSPICE OF RN EA 15 MIN: HCPCS

## 2024-11-06 RX ORDER — LEVOFLOXACIN 500 MG/1
500 TABLET, FILM COATED ORAL EVERY 24 HOURS
Qty: 21 TABLET | Refills: 0 | Status: SHIPPED | OUTPATIENT
Start: 2024-11-06 | End: 2024-11-27

## 2024-11-06 NOTE — TELEPHONE ENCOUNTER
Pt called infusion to reschedule his missed treatment since being admitted. Pt needs to be seen by provider before resuming any treatment.     AN CLERICAL: Pt has been scheduled for 11/12 11:00 AM with Dr. Hernandez. Can you please call patient and notify him of appointment and set up transport.

## 2024-11-06 NOTE — TELEPHONE ENCOUNTER
..Patient is requesting an insurance referral for the following specialty:      Test Name / Order Name:   Consult and treat - Podiatry (Toe infection - possible amputation)    DX Code:   - I73.9 (ICD-10-CM) - PAD (peripheral artery disease) ([facility])  - G62.9 (ICD-10-CM) - Peripheral neuropathy  - L60.3 (ICD-10-CM) - Onychodystrophy     Date Of Service: 11/06/2024    Location/Facility Name/Address/Phone #:   [facility]  [address] [place] [address] [place] [address]   [phone]     Location / Facility NPI:   [id number]    Best Phone # To Reach The Patient:  [phone]       Verified Results  (1) PT WITH INR [id number] 12:07PM Mahin Clayton Order Number: [id number]   Order Number: [id number]     Test Name Result Flag Reference   INR 2 10 H 0 86-1 16   PT 22 6 seconds H 11 8-14 1

## 2024-11-06 NOTE — TELEPHONE ENCOUNTER
Spoke with Cindy at PCP DR Chandra's office. Asked for a referral through [facility]. I was told they will put it in as urgent but couldn't promise they could get it for today.

## 2024-11-06 NOTE — PROGRESS NOTES
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  Osteomyelitis right hallux.  Cellulitis right toe.  Acquired deformity of foot.    Plan.  Chart reviewed.  PCP notes reviewed.  Hospital notes reviewed.  At this time we will treat for osteomyelitis.  Patiently placed on Levaquin.  Culture and sensitivity has been checked.  Patient must follow with vascular surgery.  He is in need of bypass.  This is to be scheduled as outpatient as per vascular surgery.  He will stay on Trental.  We will add Levaquin.  Continue home nursing/VNA.  Nursing instruction given.  Patient advised on condition.  He must continue to take Trental.  Stay in surgical shoe.  Return for follow-up.  Watch for signs of cellulitis increasing, fever night sweats or sepsis.         Diagnoses and all orders for this visit:    Dry gangrene (HCC)    Diabetic ulcer of toe of right foot associated with type 2 diabetes mellitus, with necrosis of bone (HCC)  -     levofloxacin (LEVAQUIN) 500 mg tablet; Take 1 tablet (500 mg total) by mouth every 24 hours for 21 days    PAD (peripheral artery disease) (HCC)    Diabetic polyneuropathy associated with type 2 diabetes mellitus (HCC)    Other acute osteomyelitis of right foot (HCC)  -     levofloxacin (LEVAQUIN) 500 mg tablet; Take 1 tablet (500 mg total) by mouth every 24 hours for 21 days    Cellulitis of toe, right          Subjective: Patient has dry gangrene of toe.  He is status post hospital stay for this.  He has been diagnosed with severe peripheral artery disease that was not treatable with arteriogram.  At this time he is receiving home VNA.  He is not taking antibiotics.  He has no history of fever or night sweats.    No Known Allergies      Current Outpatient Medications:     Accu-Chek FastClix Lancets MISC, TEST 2-3 TIMES AS DIRECTED, Disp: , Rfl:     acetaminophen (TYLENOL) 500 mg tablet, Take 2 tablets (1,000 mg total) by mouth 3 (three) times a day as  needed for mild pain, Disp: , Rfl:     Alcohol Swabs (Alcohol Pads) 70 % PADS, USE 2-3 TIMES AS DIRECTED., Disp: , Rfl:     atorvastatin (LIPITOR) 80 mg tablet, Take 1 tablet (80 mg total) by mouth daily with dinner, Disp: 90 tablet, Rfl: 1    Blood Glucose Monitoring Suppl (Accu-Chek Guide Me) w/Device KIT, USE AS DIRECTED, Disp: 1 kit, Rfl: 1    cetirizine (ZyrTEC) 5 MG tablet, Take 1 tablet (5 mg total) by mouth daily, Disp: 90 tablet, Rfl: 3    ciprofloxacin (CIPRO) 500 mg tablet, Take 1 tablet (500 mg total) by mouth every 12 (twelve) hours, Disp: 60 tablet, Rfl: 0    Empagliflozin (Jardiance) 10 MG TABS tablet, TAKE 1 TABLET (10 MG TOTAL) BY MOUTH EVERY MORNING, Disp: 30 tablet, Rfl: 5    ezetimibe (ZETIA) 10 mg tablet, Take 1 tablet (10 mg total) by mouth daily, Disp: 90 tablet, Rfl: 1    furosemide (LASIX) 20 mg tablet, TAKE 2 TABLETS (40 MG TOTAL) BY MOUTH DAILY, Disp: 60 tablet, Rfl: 5    gabapentin (NEURONTIN) 300 mg capsule, Take 2 capsules (600 mg total) by mouth 2 (two) times a day, Disp: 120 capsule, Rfl: 11    glucose blood (Accu-Chek Guide) test strip, TEST 2-3 TIMES AS DIRECTED, Disp: 100 each, Rfl: 5    Lancet Devices (Lancing Device) MISC, TID, Disp: 100 each, Rfl: 2    levofloxacin (LEVAQUIN) 500 mg tablet, Take 1 tablet (500 mg total) by mouth every 24 hours for 21 days, Disp: 21 tablet, Rfl: 0    loperamide (IMODIUM) 2 mg capsule, Take 1 capsule (2 mg total) by mouth 4 (four) times a day as needed for diarrhea, Disp: 30 capsule, Rfl: 2    losartan (COZAAR) 25 mg tablet, TAKE 0.5 TABLETS (12.5 MG TOTAL) BY MOUTH DAILY, Disp: 45 tablet, Rfl: 1    methocarbamol (ROBAXIN) 500 mg tablet, TAKE 1 TABLET (500 MG TOTAL) BY MOUTH 3 (THREE) TIMES A DAY, Disp: 90 tablet, Rfl: 0    metoprolol succinate (TOPROL-XL) 25 mg 24 hr tablet, Take 1 tablet (25 mg total) by mouth daily, Disp: 90 tablet, Rfl: 2    ondansetron (ZOFRAN) 4 mg tablet, Take 1 tablet (4 mg total) by mouth every 8 (eight) hours as needed  for nausea or vomiting, Disp: 20 tablet, Rfl: 1    oxyCODONE (Roxicodone) 5 immediate release tablet, Take 1 tablet (5 mg total) by mouth 2 (two) times a day as needed (wound pain) Max Daily Amount: 10 mg, Disp: 60 tablet, Rfl: 0    pentoxifylline (TRENtal) 400 mg ER tablet, Take 1 tablet (400 mg total) by mouth 3 (three) times a day with meals, Disp: 90 tablet, Rfl: 0    polyethylene glycol (GLYCOLAX) 17 GM/SCOOP powder, Take 17 g by mouth every other day Monday Wednesday Friday (Patient taking differently: Take 17 g by mouth every other day Monday Wednesday Friday PRN for constipation  dissolve in 8 oz liquid of choice), Disp: 510 g, Rfl: 0    potassium chloride (Klor-Con M20) 20 mEq tablet, TAKE 1 TABLET (20 MEQ TOTAL) BY MOUTH DAILY, Disp: 30 tablet, Rfl: 5    predniSONE 5 mg tablet, Take 1 tablet (5 mg total) by mouth 2 (two) times a day with meals, Disp: 60 tablet, Rfl: 5    aspirin 81 mg chewable tablet, Chew 1 tablet (81 mg total) daily, Disp: 90 tablet, Rfl: 0    clopidogrel (PLAVIX) 75 mg tablet, Take 1 tablet (75 mg total) by mouth daily, Disp: 90 tablet, Rfl: 0    famotidine (PEPCID) 20 mg tablet, Take 1 tablet (20 mg total) by mouth daily, Disp: 30 tablet, Rfl: 2    ferrous sulfate 325 (65 Fe) mg tablet, Take 1 tablet (325 mg total) by mouth 2 (two) times a day with meals, Disp: 180 tablet, Rfl: 0    senna (SENOKOT) 8.6 MG tablet, Take 1 tablet (8.6 mg total) by mouth 2 (two) times a day To prevent constipation.  Hold one dose for loose stool. (Patient not taking: Reported on 11/6/2024), Disp: 60 tablet, Rfl: 11    Patient Active Problem List   Diagnosis    Chronic combined systolic and diastolic CHF (congestive heart failure) (HCC)    S/P AVR    Anemia    Ischemic cardiomyopathy    Ischemic leg pain    Prostate cancer (HCC)    CAD (coronary artery disease)    Urinary retention    PAD (peripheral artery disease) (HCC)    Port-A-Cath in place    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)    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HCC)    Depression, recurrent (HCC)    Cancer-related pain    Palliative care patient    Ambulatory dysfunction    Peripheral neuropathy    Financial problems    Moderate vascular dementia (HCC)    Class 2 severe obesity due to excess calories with serious comorbidity and body mass index (BMI) of 36.0 to 36.9 in adult (HCC)    Elevated liver enzymes    Proctitis    Frail elder // vulnerable adult    Prevention of chemotherapy-induced neutropenia    Osteomyelitis (HCC)    Ischemic pain of foot at rest    Ulcer of right foot with fat layer exposed (HCC)    Ulcer of left foot, limited to breakdown of skin (HCC)    Dry gangrene (HCC)          Patient ID: Royce Rene is a 78 y.o. male.    HPI    The following portions of the patient's history were reviewed and updated as appropriate:     family history includes Cancer in his mother.      reports that he quit smoking about 22 years ago. His smoking use included cigarettes. He started smoking about 62 years ago. He has been exposed to tobacco smoke. He has never used smokeless tobacco. He reports that he does not currently use alcohol. He reports that he does not currently use drugs.    Vitals:    11/06/24 1515   Resp: 18       Review of Systems      Objective:  Patient's shoes and socks removed.   Foot Exam    General  General Appearance: appears stated age and healthy   Orientation: alert and oriented to person, place, and time   Gait: antalgic   Assistance: wheelchair use       Right Foot/Ankle     Inspection and Palpation  Tenderness: bony tenderness and great toe interphalangeal joint   Swelling: dorsum   Arch: pes cavus  Claw Toes: first toe and second toe    Neurovascular  Dorsalis pedis: absent  Posterior tibial: absent  Saphenous nerve sensation: absent  Tibial nerve sensation: absent  Superficial peroneal nerve sensation: absent  Deep peroneal nerve sensation: absent  Sural nerve sensation: absent      Left  Foot/Ankle      Inspection and Palpation  Swelling: dorsum   Arch: pes cavus    Neurovascular  Dorsalis pedis: 1+  Posterior tibial: 1+  Saphenous nerve sensation: absent  Tibial nerve sensation: absent  Superficial peroneal nerve sensation: absent  Deep peroneal nerve sensation: absent  Sural nerve sensation: absent      Physical Exam  Vitals and nursing note reviewed.   Cardiovascular:      Rate and Rhythm: Normal rate and regular rhythm.      Pulses:           Dorsalis pedis pulses are absent on the right side and 1+ on the left side.        Posterior tibial pulses are absent on the right side and 1+ on the left side.   Musculoskeletal:      Right foot: Bony tenderness present.   Feet:      Comments: Right hallux demonstrates dry gangrene with eschar distal aspect of toe.  Nailbed is ulcerated.  Mild surrounding erythema.  Negative pus.  Negative abscess.  Ceretec scan positive for osteomyelitis.  Skin:     Capillary Refill: Capillary refill takes more than 3 seconds.

## 2024-11-06 NOTE — TELEPHONE ENCOUNTER
"Scheduled TCM.  Patient is not experiencing any dizziness or headaches.  States he \"feels great\".      He does try to keep moving so his legs don't fall asleep.     Pt is scheduled for TCM 11/14/24.  "

## 2024-11-06 NOTE — CASE COMMUNICATION
In basket message from Anne Chandra MD      When you are scheduled to see Royce again, kindly have him call the office to schedule a follow up appointment.    I have only seen him once a year ago.      We have tried to call him regarding his head injury, he did not answer and no voice mail was set up.    He does not always answer his phone.

## 2024-11-07 ENCOUNTER — HOME CARE VISIT (OUTPATIENT)
Dept: HOME HEALTH SERVICES | Facility: HOME HEALTHCARE | Age: 79
End: 2024-11-07
Payer: COMMERCIAL

## 2024-11-07 ENCOUNTER — TELEPHONE (OUTPATIENT)
Age: 79
End: 2024-11-07

## 2024-11-07 VITALS
DIASTOLIC BLOOD PRESSURE: 60 MMHG | SYSTOLIC BLOOD PRESSURE: 118 MMHG | TEMPERATURE: 98 F | RESPIRATION RATE: 16 BRPM | WEIGHT: 220.2 LBS | OXYGEN SATURATION: 98 % | BODY MASS INDEX: 34.49 KG/M2 | HEART RATE: 70 BPM

## 2024-11-07 PROCEDURE — G0299 HHS/HOSPICE OF RN EA 15 MIN: HCPCS

## 2024-11-07 NOTE — CASE COMMUNICATION
Ship to -  Clinician     Ordering MD Chandra    #7 Other (comment) Face Left full every 3 days  #18 Wound 10/30/2024 Catheter entry/exit site Groin Left partial daily  #7 Other (comment) Toe D1, great Distal;Right 3x wk full  #8 Other (comment) Foot Left;Lateral 3x/wk full      Saline 100ml 958930 -1   Gauze [place] stretch roll 4inch n/s 12 rolls per unit  342167 -1  Transpore white  2in 393251 -1  Thin 807067 -1

## 2024-11-07 NOTE — TELEPHONE ENCOUNTER
Spoke with Roxie in regards to patient , per dr covarrubias direction are betadine wet to dry 3 x a week , for big toe on right foot and sore on left foot ,

## 2024-11-07 NOTE — TELEPHONE ENCOUNTER
Caller: Roxie Luna     Doctor and/or Office: Dr Montes     #: [phone]     Escalation: Care Needs to review nursing  orders .  The patient does not have them  she asked if you can please call her asap for she is at his house right now.

## 2024-11-07 NOTE — CASE COMMUNICATION
Ship to - Pt. Home     Ordering MD Chandra    #7 Other (comment) Face Left full every 3 days  #18 Wound 10/30/2024 Catheter entry/exit site Groin Left partial daily  #7 Other (comment) Toe D1, great Distal;Right 3x wk full  #8 Other (comment) Foot Left;Lateral 3x/wk full      Saline 100ml 834274 -1  Gauze 4x4 N/S 200 4x4s per unit  [id number] -1   Thin 692257 -2

## 2024-11-08 ENCOUNTER — HOME CARE VISIT (OUTPATIENT)
Dept: HOME HEALTH SERVICES | Facility: HOME HEALTHCARE | Age: 79
End: 2024-11-08
Payer: COMMERCIAL

## 2024-11-08 VITALS
HEART RATE: 80 BPM | SYSTOLIC BLOOD PRESSURE: 116 MMHG | DIASTOLIC BLOOD PRESSURE: 66 MMHG | OXYGEN SATURATION: 96 % | RESPIRATION RATE: 16 BRPM | TEMPERATURE: 97.8 F

## 2024-11-08 PROCEDURE — G0155 HHCP-SVS OF CSW,EA 15 MIN: HCPCS

## 2024-11-08 PROCEDURE — G0299 HHS/HOSPICE OF RN EA 15 MIN: HCPCS

## 2024-11-08 PROCEDURE — G0151 HHCP-SERV OF PT,EA 15 MIN: HCPCS

## 2024-11-09 ENCOUNTER — HOME CARE VISIT (OUTPATIENT)
Dept: HOME HEALTH SERVICES | Facility: HOME HEALTHCARE | Age: 79
End: 2024-11-09
Payer: COMMERCIAL

## 2024-11-09 VITALS
SYSTOLIC BLOOD PRESSURE: 130 MMHG | RESPIRATION RATE: 16 BRPM | TEMPERATURE: 97.7 F | OXYGEN SATURATION: 97 % | HEART RATE: 90 BPM | DIASTOLIC BLOOD PRESSURE: 76 MMHG

## 2024-11-09 PROCEDURE — G0299 HHS/HOSPICE OF RN EA 15 MIN: HCPCS

## 2024-11-10 ENCOUNTER — HOME CARE VISIT (OUTPATIENT)
Dept: HOME HEALTH SERVICES | Facility: HOME HEALTHCARE | Age: 79
End: 2024-11-10
Payer: COMMERCIAL

## 2024-11-10 VITALS
RESPIRATION RATE: 16 BRPM | DIASTOLIC BLOOD PRESSURE: 78 MMHG | OXYGEN SATURATION: 98 % | SYSTOLIC BLOOD PRESSURE: 132 MMHG | TEMPERATURE: 97.7 F | HEART RATE: 80 BPM

## 2024-11-10 PROCEDURE — G0299 HHS/HOSPICE OF RN EA 15 MIN: HCPCS

## 2024-11-11 ENCOUNTER — HOME CARE VISIT (OUTPATIENT)
Dept: HOME HEALTH SERVICES | Facility: HOME HEALTHCARE | Age: 79
End: 2024-11-11
Payer: COMMERCIAL

## 2024-11-12 ENCOUNTER — OFFICE VISIT (OUTPATIENT)
Dept: HEMATOLOGY ONCOLOGY | Facility: CLINIC | Age: 79
End: 2024-11-12
Payer: COMMERCIAL

## 2024-11-12 ENCOUNTER — TELEPHONE (OUTPATIENT)
Dept: HEMATOLOGY ONCOLOGY | Facility: CLINIC | Age: 79
End: 2024-11-12

## 2024-11-12 VITALS
RESPIRATION RATE: 18 BRPM | OXYGEN SATURATION: 98 % | DIASTOLIC BLOOD PRESSURE: 74 MMHG | SYSTOLIC BLOOD PRESSURE: 138 MMHG | TEMPERATURE: 97.3 F | BODY MASS INDEX: 34.49 KG/M2 | HEART RATE: 86 BPM | HEIGHT: 67 IN

## 2024-11-12 DIAGNOSIS — C61 PROSTATE CANCER (HCC): Primary | ICD-10-CM

## 2024-11-12 DIAGNOSIS — Z76.89 PREVENTION OF CHEMOTHERAPY-INDUCED NEUTROPENIA: ICD-10-CM

## 2024-11-12 PROCEDURE — 99214 OFFICE O/P EST MOD 30 MIN: CPT | Performed by: INTERNAL MEDICINE

## 2024-11-12 NOTE — UTILIZATION REVIEW
NOTIFICATION OF ADMISSION DISCHARGE   This is a Notification of Discharge from [facility]. Please be advised that this patient has been discharge from our facility. Below you will find the admission and discharge date and time including the patient’s disposition.   UTILIZATION REVIEW CONTACT:  Jerri Armstrong  Utilization   Network Utilization Review Department  Phone: [phone] x carefully listen to the prompts. All voicemails are confidential.  Email: [email or web address]     ADMISSION INFORMATION  PRESENTATION DATE: 10/28/2024 12:09 PM  OBERVATION ADMISSION DATE: N/A  INPATIENT ADMISSION DATE: 10/28/24 12:09 PM   DISCHARGE DATE: 11/3/2024  2:00 PM   DISPOSITION:Home with Home Health Care    Network Utilization Review Department  ATTENTION: Please call with any questions or concerns to [phone] and carefully listen to the prompts so that you are directed to the right person. All voicemails are confidential.   For Discharge needs, contact Care Management DC Support Team at [phone] opt. 2  Send all requests for admission clinical reviews, approved or denied determinations and any other requests to dedicated fax number below belonging to the campus where the patient is receiving treatment. List of dedicated fax numbers for the Facilities:  FACILITY NAME UR FAX NUMBER   ADMISSION DENIALS (Administrative/Medical Necessity) [phone]   DISCHARGE SUPPORT TEAM ([facility]) [phone]   PARENT CHILD HEALTH (Maternity/NICU/Pediatrics) [phone]   [facility] [phone]   [facility] [phone]   [facility] [phone]   [facility] [phone]   [facility] [phone]   [facility] [phone]   [facility] [phone]   [facility]  [place] [phone]   [facility] [phone]   [facility] [phone]   [facility] [phone]   [facility] [phone]

## 2024-11-12 NOTE — PROGRESS NOTES
Hematology Outpatient Follow - Up Note  Royce Rene 78 y.o. male MRN: @ Encounter: [id number]        Date:  11/12/2024        Assessment/ Plan:    Heavily pretreated metastatic [facility] Cancer [facility]. transferred care to [facility] in October 2021.       History of [place] 7 prostate cancer s/p treatment with external beam radiation 7200cGy from 10/14/2003 to 12/11/2003.   He also got neoadjuvant and adjuvant LHRH for 9 months after radiation.      July 2008 with a rising PSA and bony metastasis identified.  initiated on Lupron      PSA was increasing and bone scan identified new lesions  3/13/2015 he was initiated on abiraterone        6/2017 progression of bony metastatic disease on imaging.  Treatment changed to enzalutamide.       2/2019 palliative Radiation to sacrum     March 2020 - March 2021 Docetaxel.       3/2021 - 10/2021 no cancer directed treatment as he had significant cardiac issues and was seen in [place].     10[facility]     Lupron every 3 months     Xofigo from 11/4/2021 to 4/2022 5/2022 rechallenged with enzalutamide   8/2022 Xgeva was added for bone metastasis     May 13, 2023  (Pluvicto) first dose    PSA came down significantly after 3 cycles to 37 7/10/23 (245 5/15/23)     6/2023 abiraterone + prednisone also initiated.    10/12/23 PSA 24      12/14/2023 Dose #6      12/15/23- PLUVICTO THERAPY SPECT CT   Decreased radiotracer uptake in previously seen osseous lesions compatible with therapy response.      Patient continues on abiraterone + prednisone;  Lupron & Xgeva q 3 months.     PSMA scan on 4/2024 showed decreased activity in multiple bony lesions  CAT scan of the brain showed questionable lesion in the right frontal lobe without associating edema, I could not do an MRI because of the pacer     Repeat CAT scan of the brain on 7/18/2024 showed stable hyperdense mildly enhancing lesion in the postcentral gyrus of the  right parietal lobe  PSA went up to 285    Therapy changed to Jevtana 20 mg/m² every 3 weeks doing very well, normalization of alkaline phosphatase, will check PSA    He had vascular disease and possible osteomyelitis of the right toe, seeing the vascular surgeon    Will continue with Jevtana and pegfilgrastim        Labs and imaging studies are reviewed by ordering provider once results are available. If there are findings that need immediate attention, you will be contacted when results available.   Discussing results and the implication on your healthcare is best discussed in person at your follow-up visit.       HPI:  Royce Rene is a 78 year old male seen initially by Dr. Toledo October 5, 2021 regarding metastatic castration resistant prostate cancer.     He has multiple medical conditions including a significant cardiovascular history, heart failure, PAD.  [facility]       [facility] he was initially diagnosed with a [place] 7 prostate cancer s/p treatment with external beam radiation 7200cGy from 10/14/2003 to 12/11/2003.   He also got neoadjuvant and adjuvant LHRH for 9 months after radiation.       He was observed until July of 2008 when he was noted to have rising PSA and imaging done at that time showed new bony lesions.    He received Zoladex and later leuprolide.       PSA was increasing and bone scan identified new lesions.  3/13/2015 he was initiated on abiraterone     6/2017 He had evidence of aggressive bony metastatic disease on imaging.  Treatment changed to Enzalutamide.       His PSA continued to rise and he had progressive bony metastases including to the sacrum for which he received a course of palliative radiation, which was completed in February of 2019.  It was 3000 cGy as of radiation in 10 fractions.      He was then restarted on enzalutamide but a scan in December of 2019 showed worsening bone metastases so he was started on docetaxel in March  of 2020. He was kept on this for approximately a year with his PSA slowly rising.   Cycle 15 1/28/2021.  After March 2021, he was admitted to a hospital for cardiac issues and then was lost to follow up with his previous oncologist.     Established care with Dr. Toledo  10/2021.    11/2021 He was initiated on Lupron every 3 months     He was referred to nuclear Medicine and treated with Xofigo from 11/4/2021 to 4/2022 5/2022 rechallenged with enzalutamide   8/2022 Xgeva was added for bone metastasis   May 18  2023-12/14/2023 Pluvicto   After 3 cycles PSA came down to 32 however in July 2024 PSA went up to 285, therapy changed to Jevtana 20 mg/m² every 3 weeks              PSMA scan on 4/25/2024 showed significant decrease in the bony metastasis activity, persistent mild activity in the left aspect of the prostate gland, small foci of mild PSMA activity in the right cerebellum, he could not do the MRI of the brain so CAT scan showed stable sclerotic metastatic lesion within the right occipital calvarium lesion in the right anterior frontal lobe.    Interval History:        Previous Treatment:         Test Results:    Imaging: NM radrx ceretec abscess localization limited    Result Date: 11/2/2024  Narrative: CERETEC WHITE BLOOD CELL STUDY INDICATION:Chronic foot ulcer COMPARISON:   Right foot x-rays 10/30/2024 TECHNIQUE: The study was performed following the intravenous administration of 17.1 mCi Tc-99m labeled Ceretec white blood cells. Static images of the bilateral feet were acquired in multiple projections at 3.5 and 23 hours after injection. FINDINGS: 3.5-hour images demonstrate focal increased radiotracer uptake at the right first toe distally. This persists on the delayed 23-hour images. Also mild areas of superficial activity at the right fifth toe region plantar aspect, superior to the right ankle posteriorly and left fifth proximal metatarsal region probably related to superficial skin infection.      Impression: 1. Focal increased radiotracer uptake at the right first toe distally which persists on delayed imaging suspicious for infection and osteomyelitis. 2. Additional mild areas of superficial activity at the bilateral feet and superior to the right ankle probably related to superficial skin infection. The study was marked in EPIC for immediate notification. Workstation performed: [id number]     VAS KARLI and waveform analysis, multiple levels    Result Date: 10/30/2024  Narrative:  [facility] REPORT CLINICAL: Indications:  Patient presents with non-healing right great toe wound. Operative History: 2024-10-30 Right Right lower extremity angiogram 2023-04-18 Right Right lower extremity angiogram Coronary stent Right common femoral endarterectomy TAVR Risk Factors: The patient has history of Obesity, Hyperlipidemia, CAD and previous smoking (quit >10yrs ago). Clinical: Right Pressure:  121/ mm Hg, Left Pressure:  127/ mm Hg.  FINDINGS:  Segment       Ri  Left                         P   P  Ant. Tibial   62   0  Post. Tibial  60  85  Ankle         62  85  Metatarsal     0   0  Great Toe      0   0     CONCLUSION:  Impression: RIGHT LOWER LIMB Ankle/Brachial Index: 0.49 which is in the ischemic range. PPG/PVR Tracings are severely dampened. Monophasic waveform's at the ankle. Unable to obtain metatarsal pressure due to severely dampened waveforms. Unable to obtain great toe pressure due to severely dampened waveforms.  LEFT LOWER LIMB Ankle/Brachial Index: 0.67 which is in the moderate range PPG/PVR Tracings are severely dampened. Monophasic waveform's at the ankle. Unable to obtain metatarsal pressure due to severely dampened waveforms. Unable to obtain great toe pressure due to severely dampened waveforms.  In comparison to the study of 10/23/2024, there is no significant change.  SIGNATURE: Electronically Signed by: MARGE DO MD, RPVI on 2024-10-30 09:58:44 PM    IR lower extremity  angiogram    Result Date: 10/30/2024  Narrative: PROCEDURE: Right lower extremity angiography and interventions Procedural Personnel Attending physician(s): Dr. Rueda Pre-procedure diagnosis: Peripheral artery disease Post-procedure diagnosis: Same Indication: Nonhealing right great toe wound with osteomyelitis. Duplex ultrasound shows occlusion of right SFA stents. PROCEDURE SUMMARY: - Arterial access with ultrasound guidance - Abdominal aortogram with bilateral iliofemoral artery runoff - Right lower extremity arterial runoff angiogram - Recanalization of occluded right SFA with mechanical thrombectomy and angioplasty - Stent placement in the distal right SFA PROCEDURE DETAILS: Pre-procedure Consent: Informed consent for the procedure including risks, benefits and alternatives was obtained and time-out was performed prior to the procedure. Preparation: The site was prepared and draped using maximal sterile barrier technique including cutaneous antisepsis. Anesthesia/sedation Level of anesthesia/sedation: General anesthesia Anesthesia/sedation administered by: Anesthesiology Total intra-service sedation time (minutes): 120 Access Local anesthesia was administered. The vessel was sonographically evaluated and judged to be patent. Real time ultrasound was used to visualize needle entry into the vessel and a permanent image was stored. A 5 Icelandic sheath was placed. Vessel accessed: Left common femoral artery Access technique: Micropuncture set with 21 gauge needle Aortography Indication for aortography: Diagnostic angiography - There was no prior catheter-based angiographic study available and a full diagnostic study was performed. The decision to intervene was based on the diagnostic study. Vessel catheterized: Abdominal aorta at or above the level of the renal arteries Findings: Patent abdominal aorta with patent bilateral iliofemoral arteries. Right lower extremity angiography and interventions Omni Flush catheter  was advanced across the aortic bifurcation into the right CFA. Right lower extremity runoff artery angiogram was performed. 5 Congolese sheath in the left CFA was exchanged for a 6 Congolese 45 cm sheath which was advanced to the right CFA.  Patient was systemically heparinized using 5000 units followed by additional 2000 units later in the case. Using a combination of Glidewire advantage and Navicross catheter, the occluded right SFA stent was recanalized. Mechanical thrombectomy of the entire right SFA stent was performed using Rotarex mechanical thrombectomy device. Mild residual stenoses along the walls of the stent was treated using 5 mm x 200 mm and 5 mm x 150 mm [place] drug coated stents. Proximal-most end of the stent was further treated using 6 mm x 40 mm conventional balloon. Post angioplasty angiogram showed patent stent. There was dissection in the distal SFA which was treated with placement of 6 mm x 40 mm Bernadine drug-eluting stent. Focal stenosis in the proximal popliteal artery was present which was nonflow-limiting and was not treated. Multifocal stenoses were present in the tibial arteries without a distal target for revascularization. Closure Left common femoral artery access was closed using Perclose closure device. Contrast Contrast agent: Visipaque Contrast volume (mL): 200 Radiation Dose Fluoroscopy time (minutes): 24.3 Reference air kerma (mGy): 653 Kerma area product (Gy-cm2): 99 Additional Details Estimated blood loss (mL): 50 Complications: No immediate complications.     Impression: 1. Abdominal aorta and bilateral iliofemoral arteries were patent. 2. Occluded right SFA recanalized followed by mechanical thrombectomy and 5 mm and 6 mm balloon angioplasties. 3. Dissection was present in the distal SFA following angioplasty. Additional 6 mm x 60 mm Bernadine drug-eluting stent placed in the distal SFA overlapping pre-existing stent. 4. Focal nonflow-limiting stenosis in the proximal right popliteal  artery. Remainder of popliteal artery was patent. 4. Multifocal occlusions of right anterior tibial, posterior tibial, and peroneal arteries without distal target for revascularization. Plan: -Continue aspirin and Plavix. -Monitor for healing of right toe wound. Workstation performed: [id number]     XR foot 3+ vw right    Result Date: 10/30/2024  Narrative: XR FOOT 3+ VW RIGHT INDICATION: Osteomyelitis right hallux. COMPARISON: Compared with 10/22/2024 FINDINGS: No acute fracture or dislocation. Hallux valgus deformity Small calcaneal spur and enthesopathy No lytic or blastic osseous lesion. Unremarkable soft tissues.     Impression: No radiographic signs of osteomyelitis in the hallux. No acute osseous abnormality. Computerized Assisted Algorithm (CAA) may have been used to analyze all applicable images. Workstation performed: [id number]     VAS ARTERIAL DUPLEX- LOWER LIMB BILATERAL    Result Date: 10/23/2024  Narrative:  [facility] REPORT CLINICAL: Indications:  Left Atherosclerosis with Ulceration [I70.25]. Patient presents with an ulcer on his left great toe. Operative History: 2023-04-18 Right Right lower extremity angiogram Coronary stent Right common femoral endarterectomy TAVR Risk Factors The patient has history of Obesity, Hyperlipidemia, CAD and previous smoking (quit >10yrs ago). Clinical Right Pressure:  132/ mm Hg, Left Pressure:  130/ mm Hg.  FINDINGS:  Segment                Right                   Left                                          Impression  PSV (cm/s)  Impression  PSV (cm/s)  Common Femoral Artery                      77                      57  Prox Profunda                              84                          Prox SFA               Occluded            69  Occluded           323  Mid SFA                Occluded                Occluded            19  Dist SFA               Occluded                                    16  Proximal Pop                              109                       17  Distal Pop                                 17                      22  Dist Post Tibial                           16                      20  Dist. Ant. Tibial      Occluded                Occluded                   CONCLUSION: Impression: RIGHT LOWER LIMB: High grade stenosis versus occlusion in the proximal, mid and distal superficial femoral artery with reconstitution distally. Ankle/Brachial index:   0.39 (Ischemic Range)  Prior 0.58 PVR/ PPG tracings are obliterated. Metatarsal pressure could not be recorded due to obliterated wave-form. Great toe pressure could not be recorded due to obliterated wave-form. Compared to previous study on 07/26/23 , there is high grade stenosis versus occlusion in the proximal, mid and distal superficial femoral artery with reconstitution distally.  LEFT LOWER LIMB: High grade stenosis versus occlusion in the proximal and mid superficial femoral artery with reconstitution distally. Ankle/Brachial index:   Non-compressible.  Prior Non-compressible. PVR/ PPG tracings are obliterated. PVR/ PPG tracings are obliterated. Metatarsal pressure could not be recorded due to obliterated wave-form. Compared to previous study on 07/26/23, high grade stenosis versus occlusion in the proximal and mid superficial femoral artery with reconstitution distally.  SIGNATURE: Electronically Signed by: TROY MATA DO on 2024-10-23 05:14:59 PM    XR toe great min 2 view RIGHT    Result Date: 10/22/2024  Narrative: XR TOE RIGHT GREAT MIN 2 VIEW INDICATION: wound, concern for osteo. COMPARISON: Radiograph July 18, 2022 FINDINGS: Irregularity of the soft tissues of the great toe in the region of the nailbed with small focus of lucency in the distal aspect of the first distal phalanx. There are degenerative changes of the foot. No lytic or blastic osseous lesion. Atherosclerotic vascular calcifications.     Impression: Irregularity of the soft tissues of the great toe as well as a small  "focus of lucency in the distal aspect of the first distal phalanx suspicious for osteomyelitis. MRI may be performed for further evaluation as clinically warranted. Computerized Assisted Algorithm (CAA) may have been used to analyze all applicable images. Workstation performed: [id number]       Labs:   Lab Results   Component Value Date    WBC 7.40 11/03/2024    HGB 8.9 (L) 11/03/2024    HCT 28.0 (L) 11/03/2024    MCV 97 11/03/2024     11/03/2024     Lab Results   Component Value Date    K 3.9 11/03/2024     (H) 11/03/2024    CO2 24 11/03/2024    BUN 14 11/03/2024    CREATININE 0.74 11/03/2024    GLUF 87 07/26/2024    CALCIUM 8.0 (L) 11/03/2024    CORRECTEDCA 8.2 (L) 04/10/2023    AST 15 10/26/2024    ALT 24 10/26/2024    ALKPHOS 63 10/26/2024    EGFR 88 11/03/2024       No results found for: \"IRON\", \"TIBC\", \"FERRITIN\"    Lab Results   Component Value Date    [id number] 84 (L) 07/26/2024         ROS: Review of Systems - Oncology       Current Medications: Reviewed  Allergies: Reviewed  PMH/FH/SH:  Reviewed      Physical Exam:    Body surface area is 2.11 meters squared.    Wt Readings from Last 3 Encounters:   11/07/24 99.9 kg (220 lb 3.2 oz)   11/06/24 102 kg (225 lb)   11/04/24 102 kg (225 lb 15.5 oz)        Temp Readings from Last 3 Encounters:   11/12/24 (!) 97.3 °F (36.3 °C) (Temporal)   11/10/24 97.7 °F (36.5 °C) (Temporal)   11/09/24 97.7 °F (36.5 °C) (Temporal)        BP Readings from Last 3 Encounters:   11/12/24 138/74   11/10/24 132/78   11/09/24 130/76         Pulse Readings from Last 3 Encounters:   11/12/24 86   11/10/24 80   11/09/24 90        Physical Exam    ECOG PS:    Goals and Barriers:  Current Goal: Minimize effects of disease.   Barriers: None.      Patient's Capacity to Self Care:  Patient is able to self care.    Code Status: [unfilled]  "

## 2024-11-13 ENCOUNTER — HOME CARE VISIT (OUTPATIENT)
Dept: HOME HEALTH SERVICES | Facility: HOME HEALTHCARE | Age: 79
End: 2024-11-13
Payer: COMMERCIAL

## 2024-11-13 VITALS
SYSTOLIC BLOOD PRESSURE: 128 MMHG | OXYGEN SATURATION: 98 % | TEMPERATURE: 97.8 F | RESPIRATION RATE: 18 BRPM | DIASTOLIC BLOOD PRESSURE: 74 MMHG | HEART RATE: 74 BPM

## 2024-11-13 VITALS — HEART RATE: 76 BPM

## 2024-11-13 PROCEDURE — G0299 HHS/HOSPICE OF RN EA 15 MIN: HCPCS

## 2024-11-13 PROCEDURE — G0151 HHCP-SERV OF PT,EA 15 MIN: HCPCS

## 2024-11-14 ENCOUNTER — OFFICE VISIT (OUTPATIENT)
Dept: INTERNAL MEDICINE CLINIC | Facility: CLINIC | Age: 79
End: 2024-11-14
Payer: COMMERCIAL

## 2024-11-14 ENCOUNTER — TELEPHONE (OUTPATIENT)
Age: 79
End: 2024-11-14

## 2024-11-14 ENCOUNTER — HOSPITAL ENCOUNTER (OUTPATIENT)
Dept: VASCULAR ULTRASOUND | Facility: HOSPITAL | Age: 79
Discharge: HOME | End: 2024-11-14
Payer: COMMERCIAL

## 2024-11-14 VITALS
WEIGHT: 226 LBS | DIASTOLIC BLOOD PRESSURE: 74 MMHG | TEMPERATURE: 96.7 F | SYSTOLIC BLOOD PRESSURE: 120 MMHG | OXYGEN SATURATION: 97 % | HEIGHT: 67 IN | BODY MASS INDEX: 35.47 KG/M2 | HEART RATE: 84 BPM

## 2024-11-14 DIAGNOSIS — I73.9 PAD (PERIPHERAL ARTERY DISEASE) (HCC): Primary | ICD-10-CM

## 2024-11-14 DIAGNOSIS — I73.9 PAD (PERIPHERAL ARTERY DISEASE) (HCC): ICD-10-CM

## 2024-11-14 DIAGNOSIS — E53.8 VITAMIN B12 DEFICIENCY: ICD-10-CM

## 2024-11-14 DIAGNOSIS — H35.3221 EXUDATIVE AGE-RELATED MACULAR DEGENERATION, LEFT EYE, WITH ACTIVE CHOROIDAL NEOVASCULARIZATION (HCC): ICD-10-CM

## 2024-11-14 DIAGNOSIS — D64.9 ANEMIA, UNSPECIFIED TYPE: ICD-10-CM

## 2024-11-14 DIAGNOSIS — Z71.9 HEALTH COUNSELING: ICD-10-CM

## 2024-11-14 DIAGNOSIS — F01.B0 MODERATE VASCULAR DEMENTIA, UNSPECIFIED WHETHER BEHAVIORAL, PSYCHOTIC, OR MOOD DISTURBANCE OR ANXIETY (HCC): Chronic | ICD-10-CM

## 2024-11-14 DIAGNOSIS — M86.9 OSTEOMYELITIS OF RIGHT FOOT, UNSPECIFIED TYPE (HCC): ICD-10-CM

## 2024-11-14 DIAGNOSIS — I50.42 CHRONIC COMBINED SYSTOLIC AND DIASTOLIC CHF (CONGESTIVE HEART FAILURE) (HCC): ICD-10-CM

## 2024-11-14 DIAGNOSIS — I99.8 ISCHEMIC LEG PAIN: ICD-10-CM

## 2024-11-14 DIAGNOSIS — L98.9 FACIAL LESION: ICD-10-CM

## 2024-11-14 DIAGNOSIS — E78.2 MIXED HYPERLIPIDEMIA: ICD-10-CM

## 2024-11-14 DIAGNOSIS — I25.10 CORONARY ARTERY DISEASE INVOLVING NATIVE HEART WITHOUT ANGINA PECTORIS, UNSPECIFIED VESSEL OR LESION TYPE: ICD-10-CM

## 2024-11-14 DIAGNOSIS — M79.606 ISCHEMIC LEG PAIN: ICD-10-CM

## 2024-11-14 DIAGNOSIS — C79.51 MALIGNANT NEOPLASM METASTATIC TO BONE (HCC): ICD-10-CM

## 2024-11-14 DIAGNOSIS — I42.9 CARDIOMYOPATHY, UNSPECIFIED TYPE (HCC): ICD-10-CM

## 2024-11-14 PROCEDURE — 93971 EXTREMITY STUDY: CPT

## 2024-11-14 PROCEDURE — 99495 TRANSJ CARE MGMT MOD F2F 14D: CPT | Performed by: INTERNAL MEDICINE

## 2024-11-14 PROCEDURE — 96372 THER/PROPH/DIAG INJ SC/IM: CPT

## 2024-11-14 RX ORDER — ATORVASTATIN CALCIUM 80 MG/1
80 TABLET, FILM COATED ORAL
Qty: 90 TABLET | Refills: 1 | Status: SHIPPED | OUTPATIENT
Start: 2024-11-14

## 2024-11-14 RX ORDER — FERROUS SULFATE 325(65) MG
1 TABLET ORAL DAILY
Qty: 90 TABLET | Refills: 1 | Status: SHIPPED | OUTPATIENT
Start: 2024-11-14

## 2024-11-14 RX ORDER — ASPIRIN 81 MG/1
81 TABLET, CHEWABLE ORAL DAILY
Qty: 90 TABLET | Refills: 1 | Status: SHIPPED | OUTPATIENT
Start: 2024-11-14

## 2024-11-14 RX ORDER — CLOPIDOGREL BISULFATE 75 MG/1
75 TABLET ORAL DAILY
Qty: 90 TABLET | Refills: 1 | Status: SHIPPED | OUTPATIENT
Start: 2024-11-14

## 2024-11-14 RX ORDER — CYANOCOBALAMIN 1000 UG/ML
1000 INJECTION, SOLUTION INTRAMUSCULAR; SUBCUTANEOUS ONCE
Status: COMPLETED | OUTPATIENT
Start: 2024-11-14 | End: 2024-11-14

## 2024-11-14 RX ADMIN — CYANOCOBALAMIN 1000 MCG: 1000 INJECTION, SOLUTION INTRAMUSCULAR; SUBCUTANEOUS at 13:03

## 2024-11-14 NOTE — TELEPHONE ENCOUNTER
Spoke with [facility] who requested back dated referral CTP code. Nov 6 was PT appointment. Eval and Treat did Not work. Gave CTP 85099. Ronda did not think [employer] needed a referral.

## 2024-11-14 NOTE — TELEPHONE ENCOUNTER
"Hello,    The following message was sent via e-mail to the leadership team:     Please advise if you can help facilitate the following overbook request:    Patient Name: Royce Rene    Patient MRN: [id number]    Call back #: [phone]    Insurance: [facility]    Department:Vascular    Speciality: [facility]    Reason for overbook request: OTHER (PLEASE WRITE REASON IN COMMENT SECTION)    Comments (Write \"N/a\" if no comments):  internal medicine is requesting ASAP visit. Patient can only get a ride on thursday or Fridays. Will only go to [facility] location. Patient had consult with Meredith in hosp 10/28.    Requested doctor and location: [facility]     Date of current appointment: 11/20 CANCELED      Thank you.  "

## 2024-11-14 NOTE — TELEPHONE ENCOUNTER
Caller: [facility]     Doctor and/or Office: Dr. Montes    #: [phone]    Escalation: Last office visit Please return call to Ronda with CPT code for patient Royce. Thank you

## 2024-11-14 NOTE — ASSESSMENT & PLAN NOTE
Levels very low.  B12 injection today.  Start oral B12.    Orders:    cyanocobalamin injection 1,000 mcg    cyanocobalamin (VITAMIN B-12) 1000 MCG tablet; Take 1 tablet (1,000 mcg total) by mouth daily

## 2024-11-14 NOTE — ASSESSMENT & PLAN NOTE
Supposed to be on furosemide 20 mg with K and Jardiance, per cardiology.  Also on losartan 12.5 mg, metoprolol 25 mg daily.

## 2024-11-14 NOTE — ASSESSMENT & PLAN NOTE
Vein mapping done earlier today.  Follow up with vascular, rescheduled appointment for him.    Supposed to be on ASA, Plavix, atorvastatin. Resent to the pharmacy.  On gabapentin bid for pain, Trental tid.

## 2024-11-14 NOTE — PROGRESS NOTES
Name: Royce Rene      : 1945      MRN: [id number]  Encounter Provider: Anne Chandra MD  Encounter Date: 2024   Encounter department: [facility] INTERNAL MEDICINE  :  Assessment & Plan  PAD (peripheral artery disease) (HCC)  Vein mapping done earlier today.  Follow up with vascular, rescheduled appointment for him.    Supposed to be on ASA, Plavix, atorvastatin. Resent to the pharmacy.  On gabapentin bid for pain, Trental tid.         Osteomyelitis of right foot, unspecified type (HCC)  Currently on Levaquin, per podiatry. ?still on Cipro.  Continue wound care, per podiatry.         Chronic combined systolic and diastolic CHF (congestive heart failure) (HCC)  Supposed to be on furosemide 20 mg with K and Jardiance, per cardiology.  Also on losartan 12.5 mg, metoprolol 25 mg daily.         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)  On Prednisone 5 mg bid.  Follow up with oncology.  Followed by palliative.         Vitamin B12 deficiency  Levels very low.  B12 injection today.  Start oral B12.    Orders:    cyanocobalamin injection 1,000 mcg    cyanocobalamin (VITAMIN B-12) 1000 MCG tablet; Take 1 tablet (1,000 mcg total) by mouth daily    Anemia, unspecified type  Stable.  Restart iron daily.    Orders:    ferrous sulfate 325 (65 Fe) mg tablet; Take 1 tablet (325 mg total) by mouth daily    Mixed hyperlipidemia  ? On Zetia, atorvastatin 80 mg. Refilled.         Moderate vascular dementia, unspecified whether behavioral, psychotic, or mood disturbance or anxiety (HCC)  ? Advance directive.  Replace B12.         Exudative age-related macular degeneration, left eye, with active choroidal neovascularization (HCC)         Cardiomyopathy, unspecified type (HCC)    Orders:    atorvastatin (LIPITOR) 80 mg tablet; Take 1 tablet (80 mg total) by mouth daily with dinner    Ischemic leg pain    Orders:    aspirin 81 mg chewable tablet; Chew 1 tablet (81 mg total) daily    Coronary artery disease  "involving native heart without angina pectoris, unspecified vessel or lesion type    Orders:    clopidogrel (PLAVIX) 75 mg tablet; Take 1 tablet (75 mg total) by mouth daily    Facial lesion  Non healing wound, for years.  Suspect skin cancer. Recommend dermatology evaluation.         Health counseling  Flu vaccine updated.         Follow up in 4 months or as needed.       History of Present Illness     He is here today.  He reports that his son brought him to this appointment.  His son can bring him to appointments every Thursdays and Fridays.    He is worried that he is unable to make his appointment with vascular surgery next Wednesday, he does not have a ride.    He reports a skin lesion by his left temple area for several years.  He had picked at it and it has not healed since.    He reports no chest pain, shortness of breath.  No constipation or issues with urinating.    He does not think he has been taking his cholesterol medication.  He reports visiting nurse has been checking his medications and making sure he is taking the correct ones.    He reports no pain in his toes or foot.  This is being every 2 days for visiting nurses.  He had a test earlier today, prior to this appointment.      Review of Systems   Constitutional:  Negative for appetite change and fatigue.   HENT:  Negative for congestion.    Eyes:  Positive for visual disturbance.   Respiratory:  Negative for cough and shortness of breath.    Cardiovascular:  Negative for chest pain, palpitations and leg swelling.   Gastrointestinal:  Negative for abdominal pain and constipation.   Genitourinary:  Negative for dysuria and frequency.   Musculoskeletal:  Positive for gait problem. Negative for arthralgias.   Skin:  Positive for color change and wound.   Neurological:  Positive for numbness. Negative for dizziness and headaches.          Objective   /74   Pulse 84   Temp (!) 96.7 °F (35.9 °C)   Ht 5' 7\" (1.702 m)   Wt 103 kg (226 lb)   " SpO2 97%   BMI 35.40 kg/m²      Physical Exam  Vitals and nursing note reviewed.   Constitutional:       General: He is not in acute distress.     Appearance: He is well-developed.   HENT:      Head: Normocephalic and atraumatic.   Eyes:      Conjunctiva/sclera: Conjunctivae normal.   Cardiovascular:      Rate and Rhythm: Normal rate and regular rhythm.      Heart sounds: No murmur heard.  Pulmonary:      Effort: Pulmonary effort is normal. No respiratory distress.      Breath sounds: Normal breath sounds.   Abdominal:      Palpations: Abdomen is soft.      Tenderness: There is no abdominal tenderness.   Musculoskeletal:         General: No swelling.      Cervical back: Neck supple.        Feet:    Feet:      Right foot:      Skin integrity: Ulcer present.      Left foot:      Skin integrity: Ulcer present.   Skin:     General: Skin is warm and dry.   Neurological:      General: No focal deficit present.      Mental Status: He is alert.   Psychiatric:         Mood and Affect: Mood normal.           TCM Call       Date and time call was made  11/6/2024  9:06 AM    Hospital care reviewed  Records reviewed    Patient was hospitialized at  [facility]    Date of Admission  10/28/24    Date of discharge  11/03/24    Diagnosis  Osteomyelitis    Disposition  Home    Were the patients medications reviewed and updated  No    Current Symptoms  None          TCM Call       Post hospital issues  None    Should patient be enrolled in anticoag monitoring?  No    Scheduled for follow up?  Yes    Did you obtain your prescribed medications  Yes    Do you need help managing your prescriptions or medications  No    Is transportation to your appointment needed  No    I have advised the patient to call PCP with any new or worsening symptoms  Ronda Kaiser    Living Arrangements  Alone            Labs & imaging results reviewed with patient.

## 2024-11-15 ENCOUNTER — HOME CARE VISIT (OUTPATIENT)
Dept: HOME HEALTH SERVICES | Facility: HOME HEALTHCARE | Age: 79
End: 2024-11-15
Payer: COMMERCIAL

## 2024-11-15 VITALS
RESPIRATION RATE: 18 BRPM | TEMPERATURE: 97.7 F | HEART RATE: 78 BPM | SYSTOLIC BLOOD PRESSURE: 130 MMHG | OXYGEN SATURATION: 97 % | DIASTOLIC BLOOD PRESSURE: 80 MMHG

## 2024-11-15 VITALS — DIASTOLIC BLOOD PRESSURE: 66 MMHG | SYSTOLIC BLOOD PRESSURE: 119 MMHG

## 2024-11-15 PROCEDURE — G0299 HHS/HOSPICE OF RN EA 15 MIN: HCPCS

## 2024-11-15 PROCEDURE — 93971 EXTREMITY STUDY: CPT | Performed by: SURGERY

## 2024-11-15 PROCEDURE — G0151 HHCP-SERV OF PT,EA 15 MIN: HCPCS

## 2024-11-15 RX ORDER — SODIUM CHLORIDE 9 MG/ML
20 INJECTION, SOLUTION INTRAVENOUS ONCE
Status: CANCELLED | OUTPATIENT
Start: 2024-11-21

## 2024-11-15 RX ORDER — FUROSEMIDE 20 MG/1
40 TABLET ORAL DAILY
Qty: 60 TABLET | Refills: 5 | Status: SHIPPED | OUTPATIENT
Start: 2024-11-15

## 2024-11-18 ENCOUNTER — HOME CARE VISIT (OUTPATIENT)
Dept: HOME HEALTH SERVICES | Facility: HOME HEALTHCARE | Age: 79
End: 2024-11-18
Payer: COMMERCIAL

## 2024-11-18 VITALS
TEMPERATURE: 97.7 F | DIASTOLIC BLOOD PRESSURE: 78 MMHG | RESPIRATION RATE: 18 BRPM | OXYGEN SATURATION: 96 % | HEART RATE: 80 BPM | SYSTOLIC BLOOD PRESSURE: 128 MMHG

## 2024-11-18 DIAGNOSIS — C61 PROSTATE CANCER (HCC): ICD-10-CM

## 2024-11-18 DIAGNOSIS — G89.29 CHRONIC MIDLINE LOW BACK PAIN: ICD-10-CM

## 2024-11-18 DIAGNOSIS — M54.50 CHRONIC MIDLINE LOW BACK PAIN: ICD-10-CM

## 2024-11-18 PROCEDURE — G0299 HHS/HOSPICE OF RN EA 15 MIN: HCPCS

## 2024-11-18 RX ORDER — PREDNISONE 5 MG/1
5 TABLET ORAL 2 TIMES DAILY WITH MEALS
Qty: 60 TABLET | Refills: 5 | Status: SHIPPED | OUTPATIENT
Start: 2024-11-18

## 2024-11-19 ENCOUNTER — APPOINTMENT (OUTPATIENT)
Dept: LAB | Facility: HOSPITAL | Age: 79
End: 2024-11-19
Payer: COMMERCIAL

## 2024-11-19 ENCOUNTER — TELEPHONE (OUTPATIENT)
Age: 79
End: 2024-11-19

## 2024-11-19 ENCOUNTER — HOME CARE VISIT (OUTPATIENT)
Dept: HOME HEALTH SERVICES | Facility: HOME HEALTHCARE | Age: 79
End: 2024-11-19
Payer: COMMERCIAL

## 2024-11-19 VITALS — SYSTOLIC BLOOD PRESSURE: 98 MMHG | DIASTOLIC BLOOD PRESSURE: 60 MMHG

## 2024-11-19 DIAGNOSIS — Z76.89 PREVENTION OF CHEMOTHERAPY-INDUCED NEUTROPENIA: ICD-10-CM

## 2024-11-19 DIAGNOSIS — C79.51 MALIGNANT NEOPLASM METASTATIC TO BONE (HCC): ICD-10-CM

## 2024-11-19 DIAGNOSIS — C61 PROSTATE CANCER (HCC): ICD-10-CM

## 2024-11-19 DIAGNOSIS — C61 PROSTATE CANCER (HCC): Primary | ICD-10-CM

## 2024-11-19 LAB
ALBUMIN SERPL BCG-MCNC: 4.1 G/DL (ref 3.5–5)
ALP SERPL-CCNC: 78 U/L (ref 34–104)
ALT SERPL W P-5'-P-CCNC: 31 U/L (ref 7–52)
ANION GAP SERPL CALCULATED.3IONS-SCNC: 11 MMOL/L (ref 4–13)
AST SERPL W P-5'-P-CCNC: 34 U/L (ref 13–39)
BASOPHILS # BLD AUTO: 0.06 THOUSANDS/ÂΜL (ref 0–0.1)
BASOPHILS NFR BLD AUTO: 1 % (ref 0–1)
BILIRUB SERPL-MCNC: 0.6 MG/DL (ref 0.2–1)
BUN SERPL-MCNC: 29 MG/DL (ref 5–25)
CALCIUM SERPL-MCNC: 9.1 MG/DL (ref 8.4–10.2)
CHLORIDE SERPL-SCNC: 106 MMOL/L (ref 96–108)
CO2 SERPL-SCNC: 23 MMOL/L (ref 21–32)
CREAT SERPL-MCNC: 0.94 MG/DL (ref 0.6–1.3)
EOSINOPHIL # BLD AUTO: 0.07 THOUSAND/ÂΜL (ref 0–0.61)
EOSINOPHIL NFR BLD AUTO: 1 % (ref 0–6)
ERYTHROCYTE [DISTWIDTH] IN BLOOD BY AUTOMATED COUNT: 14.6 % (ref 11.6–15.1)
GFR SERPL CREATININE-BSD FRML MDRD: 76 ML/MIN/1.73SQ M
GLUCOSE SERPL-MCNC: 107 MG/DL (ref 65–140)
HCT VFR BLD AUTO: 34.7 % (ref 36.5–49.3)
HGB BLD-MCNC: 10.9 G/DL (ref 12–17)
IMM GRANULOCYTES # BLD AUTO: 0.11 THOUSAND/UL (ref 0–0.2)
IMM GRANULOCYTES NFR BLD AUTO: 1 % (ref 0–2)
LYMPHOCYTES # BLD AUTO: 1.23 THOUSANDS/ÂΜL (ref 0.6–4.47)
LYMPHOCYTES NFR BLD AUTO: 12 % (ref 14–44)
MCH RBC QN AUTO: 30.4 PG (ref 26.8–34.3)
MCHC RBC AUTO-ENTMCNC: 31.4 G/DL (ref 31.4–37.4)
MCV RBC AUTO: 97 FL (ref 82–98)
MONOCYTES # BLD AUTO: 0.64 THOUSAND/ÂΜL (ref 0.17–1.22)
MONOCYTES NFR BLD AUTO: 6 % (ref 4–12)
NEUTROPHILS # BLD AUTO: 7.82 THOUSANDS/ÂΜL (ref 1.85–7.62)
NEUTS SEG NFR BLD AUTO: 79 % (ref 43–75)
NRBC BLD AUTO-RTO: 0 /100 WBCS
PLATELET # BLD AUTO: 293 THOUSANDS/UL (ref 149–390)
PMV BLD AUTO: 9.9 FL (ref 8.9–12.7)
POTASSIUM SERPL-SCNC: 3.8 MMOL/L (ref 3.5–5.3)
PROT SERPL-MCNC: 6.6 G/DL (ref 6.4–8.4)
RBC # BLD AUTO: 3.58 MILLION/UL (ref 3.88–5.62)
SODIUM SERPL-SCNC: 140 MMOL/L (ref 135–147)
WBC # BLD AUTO: 9.93 THOUSAND/UL (ref 4.31–10.16)

## 2024-11-19 PROCEDURE — 80053 COMPREHEN METABOLIC PANEL: CPT

## 2024-11-19 PROCEDURE — G0151 HHCP-SERV OF PT,EA 15 MIN: HCPCS

## 2024-11-19 RX ORDER — METHOCARBAMOL 500 MG/1
500 TABLET, FILM COATED ORAL 3 TIMES DAILY
Qty: 90 TABLET | Refills: 0 | Status: SHIPPED | OUTPATIENT
Start: 2024-11-19

## 2024-11-19 NOTE — TELEPHONE ENCOUNTER
Received a phone call from Darshana, home care PT.  Darshana is with patient now and he is c/o 10/10 pain in his toe and foot.  Patient is taking Oxycodone and Gabapentin as prescribed.  Darshana stated that she is unable to do gait training with him, as he will not be able to tolerate it.  Darshana inquiring if any physicians make visits to home because patient will not be able to come in to office.  Please advise and call patient with any further recommendations.

## 2024-11-20 ENCOUNTER — HOME CARE VISIT (OUTPATIENT)
Dept: HOME HEALTH SERVICES | Facility: HOME HEALTHCARE | Age: 79
End: 2024-11-20
Payer: COMMERCIAL

## 2024-11-20 VITALS
DIASTOLIC BLOOD PRESSURE: 74 MMHG | RESPIRATION RATE: 18 BRPM | HEART RATE: 74 BPM | TEMPERATURE: 97.8 F | OXYGEN SATURATION: 97 % | SYSTOLIC BLOOD PRESSURE: 124 MMHG

## 2024-11-20 LAB — PSA SERPL-MCNC: 354.33 NG/ML (ref 0–4)

## 2024-11-20 PROCEDURE — G0299 HHS/HOSPICE OF RN EA 15 MIN: HCPCS

## 2024-11-21 ENCOUNTER — HOSPITAL ENCOUNTER (OUTPATIENT)
Dept: INFUSION CENTER | Facility: CLINIC | Age: 79
Discharge: HOME | End: 2024-11-21
Payer: COMMERCIAL

## 2024-11-21 VITALS
DIASTOLIC BLOOD PRESSURE: 85 MMHG | TEMPERATURE: 96.8 F | BODY MASS INDEX: 35.31 KG/M2 | OXYGEN SATURATION: 96 % | RESPIRATION RATE: 18 BRPM | SYSTOLIC BLOOD PRESSURE: 124 MMHG | WEIGHT: 225 LBS | HEART RATE: 85 BPM | HEIGHT: 67 IN

## 2024-11-21 DIAGNOSIS — Z76.89 PREVENTION OF CHEMOTHERAPY-INDUCED NEUTROPENIA: ICD-10-CM

## 2024-11-21 DIAGNOSIS — C61 PROSTATE CANCER (HCC): Primary | ICD-10-CM

## 2024-11-21 PROCEDURE — 96402 CHEMO HORMON ANTINEOPL SQ/IM: CPT

## 2024-11-21 PROCEDURE — 96375 TX/PRO/DX INJ NEW DRUG ADDON: CPT

## 2024-11-21 PROCEDURE — 96367 TX/PROPH/DG ADDL SEQ IV INF: CPT

## 2024-11-21 PROCEDURE — 96377 APPLICATON ON-BODY INJECTOR: CPT

## 2024-11-21 PROCEDURE — 96413 CHEMO IV INFUSION 1 HR: CPT

## 2024-11-21 RX ORDER — SODIUM CHLORIDE 9 MG/ML
20 INJECTION, SOLUTION INTRAVENOUS ONCE
Status: COMPLETED | OUTPATIENT
Start: 2024-11-21 | End: 2024-11-21

## 2024-11-21 RX ADMIN — LEUPROLIDE ACETATE 22.5 MG: KIT at 10:48

## 2024-11-21 RX ADMIN — PEGFILGRASTIM 6 MG: KIT SUBCUTANEOUS at 10:44

## 2024-11-21 RX ADMIN — DIPHENHYDRAMINE HYDROCHLORIDE 25 MG: 50 INJECTION, SOLUTION INTRAMUSCULAR; INTRAVENOUS at 08:13

## 2024-11-21 RX ADMIN — FAMOTIDINE 20 MG: 10 INJECTION INTRAVENOUS at 08:37

## 2024-11-21 RX ADMIN — ONDANSETRON 8 MG: 2 INJECTION, SOLUTION INTRAMUSCULAR; INTRAVENOUS at 07:55

## 2024-11-21 RX ADMIN — SODIUM CHLORIDE 20 ML/HR: 0.9 INJECTION, SOLUTION INTRAVENOUS at 07:55

## 2024-11-21 RX ADMIN — SODIUM CHLORIDE 32 MG: 0.9 INJECTION, SOLUTION INTRAVENOUS at 09:32

## 2024-11-21 NOTE — PROGRESS NOTES
Patient tolerated Jevtana without issue. R PAC remains with brisk blood return, flushed well. Deaccessed, bandaid in place. Patient tolerated Lupron injection to LEFT glute, no bleeding noted, bandaid in place. Neulasta Onpro applied to RIGHT arm, green light flashing. Patient aware of start and removal time tomorrow 11/22, times written on Neulasta booklet and provided to patient. Patient aware of next appt 12/12 at 0830, AVS calendar printed and provided.

## 2024-11-21 NOTE — PROGRESS NOTES
Patient arrives to infusion center for D1 C6 Jevtana + Lupron. Patient offers no acute complaints, reports he has been doing with ongoing toe infection that he is being treated for, has f/u with vascular surgeon for possible stenting to help blood flow. VSS. Labs reviewed from 11/19/24 and are within parameters for treatment today. Noted Xgeva is on hold at this time. R PAC accessed without issue, brisk blood return noted. Port flushed well without resistance, gauze dressing in place, patient tolerated well. Patient resting on recliner chair, call bell within reach.

## 2024-12-02 ENCOUNTER — TELEPHONE (OUTPATIENT)
Age: 79
End: 2024-12-02

## 2024-12-02 ENCOUNTER — IN HOME VISIT (OUTPATIENT)
Age: 79
End: 2024-12-02
Payer: COMMERCIAL

## 2024-12-02 VITALS
BODY MASS INDEX: 35.22 KG/M2 | DIASTOLIC BLOOD PRESSURE: 72 MMHG | RESPIRATION RATE: 18 BRPM | OXYGEN SATURATION: 97 % | HEIGHT: 67 IN | TEMPERATURE: 97.9 F | HEART RATE: 88 BPM | WEIGHT: 224.4 LBS | SYSTOLIC BLOOD PRESSURE: 105 MMHG

## 2024-12-02 DIAGNOSIS — C79.51 MALIGNANT NEOPLASM METASTATIC TO BONE (HCC): ICD-10-CM

## 2024-12-02 DIAGNOSIS — L08.9 DIABETIC FOOT INFECTION  (HCC): ICD-10-CM

## 2024-12-02 DIAGNOSIS — G89.3 CANCER-RELATED PAIN: Chronic | ICD-10-CM

## 2024-12-02 DIAGNOSIS — I96 DRY GANGRENE (HCC): ICD-10-CM

## 2024-12-02 DIAGNOSIS — C61 PROSTATE CANCER (HCC): ICD-10-CM

## 2024-12-02 DIAGNOSIS — E11.628 DIABETIC FOOT INFECTION  (HCC): ICD-10-CM

## 2024-12-02 DIAGNOSIS — Z51.5 PALLIATIVE CARE PATIENT: Primary | Chronic | ICD-10-CM

## 2024-12-02 DIAGNOSIS — R26.2 AMBULATORY DYSFUNCTION: Chronic | ICD-10-CM

## 2024-12-02 PROCEDURE — G2211 COMPLEX E/M VISIT ADD ON: HCPCS

## 2024-12-02 PROCEDURE — 99349 HOME/RES VST EST MOD MDM 40: CPT

## 2024-12-02 RX ORDER — BISMUTH SUBSALICYLATE 262 MG/1
524 TABLET, CHEWABLE ORAL
COMMUNITY

## 2024-12-02 NOTE — PROGRESS NOTES
[facility] Visit: follow up   Name: Royce Rene      : 1945      MRN: [id number]  Encounter Provider: OMAIRA Carmona  Encounter Date: 2024   Encounter department: [facility]    Assessment & Plan   1. Palliative care patient  Assessment & Plan:  Royce follows with palliative care for psychosocial support and symptom management of metastatic cancer, CHF   Symptoms - pain   ACP -  nothing on file   RTC - 4 weeks.    Patient lives an apartment    Son- Rich lives in apartment above.  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   Was working until about 3 years ago.   DME- electric scooter, chair left.    Patient does have insight into his medical conditions, he lacks ability to coordinate self cares ( hygiene, unkept living space)    2. Prostate cancer (HCC)  Assessment & Plan:  Patient with a history of metastatic prostate cancer, [facility] Dr. Hernandez.    S/p radiation and chemotherapy    3. 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  Mildly hyperdense lesion likely present involving the parafalcine right frontal lobe, and which previously had increased radiotracer activity in this region on prior Axumin PET/CT from 2022 although minimal activity is noted in this region on the   subsequent PET CT from 2024. This likely represents a stable treated metastasis in this region.  Relatively subtle, but likely stable mildly hyperdense/enhancing lesions involving the right cerebellar hemisphere, 1 of  which appears partially calcified posteriorly, and which had corresponding radiotracer activity in this region on PET/CT from   4/25/2024, concerning for intracranial metastases. No significant vasogenic edema is noted.  Stable sclerotic lesion involving the right occipital bone, indicative of an osseous metastasis  4. Diabetic foot infection  (HCC)  5. Cancer-related pain  Assessment & Plan:  Current regimen:  Tylenol 1000mg TID PRN  Oxycodone 5mg BID PRN  Bowel regimen to prevent OIC:  Reports that he gets diarrhea from his chemo treatments- will use Imodium PRN  Opioid agreement reviewed and signed: on file with  Palliative Care  I have reviewed the patient's controlled substance dispensing history in the Prescription Drug Monitoring Program in compliance with the [facility] regulations before prescribing any controlled substance    6. Ambulatory dysfunction  Assessment & Plan:  Reports that he has unsteady gait  Using electric scooter, has chair lift.    7. Dry gangrene (HCC)  Assessment & Plan:  Is following with Podiatry.  Has upcoming Vascular apt.       Subjective   Chief Complaint   Patient presents with   • Consult     New home visit for symptom management and psychosocial support secondary to palliative diagnosis of stage 4 prostate cancer with mets to bone and possible to brain.        History of Present Illness     Royce Rene is a 79 y.o. male who presents in follow up of symptoms related to stage IV prostate cancer. He follows with Dr. Hernandez. He has a significant cardiovascular history and follows with  Dr. Trejo  and MARLON with distal pain at rest in both feet.  He has an ICD for a h/o VT, and underwent TAVR in 6/2021. The patient is seen in their home due to ambulatory difficulties related to their advanced illness.  Pertinent issues include: symptom management, assessment of goals of care, disease process education and discussion of prognosis, advance care planning    Patient is seen in the home with  "palliative RN, CMOC present.    Patient is known to palliative and has previously been following with Dr. Andrew at the [place] outpatient office.  Due to ambulatory concerns patient is unable to make it into the office therefore will be followed by the home program.    Currently patient denies pain while resting.  He does report that he has numbness in his lower extremities, which he states is worse than when he stands up.  Patient lives alone, son lives in the attic about him.  But his son does work during the day.  Patient endorses that he is able to call his son for help if needed.  Patient has a shower chair which he endorses he does have some difficulty so he has been lacking on his hygiene.  Patient uses electric scooter to get around his apartment.  He reports that his appetite is good he does watch his sodium intake in his food.  He does not endorse any issues with sleep, he states \"I sleep when I am tired\".  Patient does have upcoming appointment with podiatry and vascular.  He states that the oxycodone has been helpful in managing his pain.  He is hopeful that vascular can help him with the circulation in his lower legs and eventually he will no longer need pain medication.              Current Outpatient Medications:   •  acetaminophen (TYLENOL) 500 mg tablet, Take 2 tablets (1,000 mg total) by mouth 3 (three) times a day as needed for mild pain, Disp: , Rfl:   •  aspirin 81 mg chewable tablet, Chew 1 tablet (81 mg total) daily, Disp: 90 tablet, Rfl: 1  •  atorvastatin (LIPITOR) 80 mg tablet, Take 1 tablet (80 mg total) by mouth daily with dinner, Disp: 90 tablet, Rfl: 1  •  bismuth subsalicylate (PEPTO BISMOL) 262 MG chewable tablet, Chew 524 mg As needed, Disp: , Rfl:   •  ciprofloxacin (CIPRO) 500 mg tablet, Take 1 tablet (500 mg total) by mouth every 12 (twelve) hours, Disp: 60 tablet, Rfl: 0  •  clopidogrel (PLAVIX) 75 mg tablet, Take 1 tablet (75 mg total) by mouth daily, Disp: 90 tablet, Rfl: " 1  •  cyanocobalamin (VITAMIN B-12) 1000 MCG tablet, Take 1 tablet (1,000 mcg total) by mouth daily, Disp: 90 tablet, Rfl: 1  •  Empagliflozin (Jardiance) 10 MG TABS tablet, TAKE 1 TABLET (10 MG TOTAL) BY MOUTH EVERY MORNING, Disp: 30 tablet, Rfl: 5  •  ezetimibe (ZETIA) 10 mg tablet, Take 1 tablet (10 mg total) by mouth daily, Disp: 90 tablet, Rfl: 1  •  ferrous sulfate 325 (65 Fe) mg tablet, Take 1 tablet (325 mg total) by mouth daily, Disp: 90 tablet, Rfl: 1  •  furosemide (LASIX) 20 mg tablet, TAKE 2 TABLETS (40 MG TOTAL) BY MOUTH DAILY, Disp: 60 tablet, Rfl: 5  •  gabapentin (NEURONTIN) 300 mg capsule, Take 2 capsules (600 mg total) by mouth 2 (two) times a day, Disp: 120 capsule, Rfl: 11  •  loperamide (IMODIUM) 2 mg capsule, Take 1 capsule (2 mg total) by mouth 4 (four) times a day as needed for diarrhea, Disp: 30 capsule, Rfl: 2  •  losartan (COZAAR) 25 mg tablet, TAKE 0.5 TABLETS (12.5 MG TOTAL) BY MOUTH DAILY, Disp: 45 tablet, Rfl: 1  •  methocarbamol (ROBAXIN) 500 mg tablet, TAKE 1 TABLET (500 MG TOTAL) BY MOUTH 3 (THREE) TIMES A DAY, Disp: 90 tablet, Rfl: 0  •  metoprolol succinate (TOPROL-XL) 25 mg 24 hr tablet, Take 1 tablet (25 mg total) by mouth daily, Disp: 90 tablet, Rfl: 2  •  ondansetron (ZOFRAN) 4 mg tablet, Take 1 tablet (4 mg total) by mouth every 8 (eight) hours as needed for nausea or vomiting, Disp: 20 tablet, Rfl: 1  •  oxyCODONE (Roxicodone) 5 immediate release tablet, Take 1 tablet (5 mg total) by mouth 2 (two) times a day as needed (wound pain) Max Daily Amount: 10 mg, Disp: 60 tablet, Rfl: 0  •  pentoxifylline (TRENtal) 400 mg ER tablet, Take 1 tablet (400 mg total) by mouth 3 (three) times a day with meals, Disp: 90 tablet, Rfl: 0  •  potassium chloride (Klor-Con M20) 20 mEq tablet, TAKE 1 TABLET (20 MEQ TOTAL) BY MOUTH DAILY, Disp: 30 tablet, Rfl: 5  •  predniSONE 5 mg tablet, TAKE 1 TABLET (5 MG TOTAL) BY MOUTH 2 (TWO) TIMES A DAY WITH MEALS, Disp: 60 tablet, Rfl: 5  •  Accu-Chek  "FastClix Lancets MISC, TEST 2-3 TIMES AS DIRECTED (Patient not taking: Reported on 12/2/2024), Disp: , Rfl:   •  Alcohol Swabs (Alcohol Pads) 70 % PADS, USE 2-3 TIMES AS DIRECTED., Disp: , Rfl:   •  Blood Glucose Monitoring Suppl (Accu-Chek Guide Me) w/Device KIT, USE AS DIRECTED (Patient not taking: Reported on 12/2/2024), Disp: 1 kit, Rfl: 1  •  famotidine (PEPCID) 20 mg tablet, Take 1 tablet (20 mg total) by mouth daily, Disp: 30 tablet, Rfl: 2  •  glucose blood (Accu-Chek Guide) test strip, TEST 2-3 TIMES AS DIRECTED (Patient not taking: Reported on 12/2/2024), Disp: 100 each, Rfl: 5  •  Lancet Devices (Lancing Device) MISC, TID (Patient not taking: Reported on 12/2/2024), Disp: 100 each, Rfl: 2  •  polyethylene glycol (GLYCOLAX) 17 GM/SCOOP powder, Take 17 g by mouth every other day Monday Wednesday Friday (Patient taking differently: Take 17 g by mouth every other day. Monday Wednesday Friday PRN for constipation add dissolve in 8 ounces of liquid of your choice), Disp: 510 g, Rfl: 0    Objective   /72 (BP Location: Left arm, Patient Position: Sitting, Cuff Size: Standard)   Pulse 88   Temp 97.9 °F (36.6 °C) (Temporal)   Resp 18   Ht 5' 7\" (1.702 m)   Wt 102 kg (224 lb 6.4 oz) Comment: unable to stand due to foot pain upon standing  SpO2 97%   BMI 35.15 kg/m²   Physical Exam  Vitals reviewed.   Constitutional:       General: He is not in acute distress.  HENT:      Head: Normocephalic and atraumatic.   Eyes:      General:         Right eye: No discharge.         Left eye: No discharge.   Cardiovascular:      Rate and Rhythm: Normal rate.   Pulmonary:      Effort: Pulmonary effort is normal. No respiratory distress.      Breath sounds: Decreased breath sounds present.   Abdominal:      General: Bowel sounds are normal. There is no distension.      Palpations: Abdomen is soft.   Musculoskeletal:         General: Normal range of motion.      Cervical back: Normal range of motion.      Right lower " leg: No edema.      Left lower leg: No edema.   Skin:     General: Skin is warm and dry.      Coloration: Skin is not jaundiced or pale.   Neurological:      Mental Status: He is alert and oriented to person, place, and time. Mental status is at baseline.   Psychiatric:         Mood and Affect: Mood normal.         Behavior: Behavior normal.        Recent labs:  Lab Results   Component Value Date/Time    SODIUM 140 11/19/2024 01:07 AM    K 3.8 11/19/2024 01:07 AM    BUN 29 (H) 11/19/2024 01:07 AM    CREATININE 0.94 11/19/2024 01:07 AM    GLUC 107 11/19/2024 01:07 AM    CALCIUM 9.1 11/19/2024 01:07 AM    AST 34 11/19/2024 01:07 AM    ALT 31 11/19/2024 01:07 AM    ALB 4.1 11/19/2024 01:07 AM    TP 6.6 11/19/2024 01:07 AM    EGFR 76 11/19/2024 01:07 AM     Lab Results   Component Value Date/Time    HGB 10.9 (L) 11/19/2024 01:07 AM    WBC 9.93 11/19/2024 01:07 AM     11/19/2024 01:07 AM    INR 0.98 10/30/2024 06:06 AM    PTT 37 04/03/2023 07:50 PM     Lab Results   Component Value Date/Time    RQM4HWAZSKOR 1.255 04/03/2023 07:50 PM       Recent Imaging:  Procedure: VAS VEIN MAPPING- LOWER EXTREMITY  Result Date: 11/15/2024  Narrative:  [facility] REPORT CLINICAL: Indications: Pre-operative vein mapping for potential LimFlow procedure.  Patient presents with significant peripheral arterial occlusive disease with significant tib/peroneal occlusive disease. Operative History: 2024-10-30 Right Right lower extremity angiogram 2023-04-18 Right Right lower extremity angiogram Coronary stent Right common femoral endarterectomy TAVR Risk Factors The patient has history of Obesity, Hyperlipidemia, CAD and smoking (quit).  FINDINGS:  Right          AP (mm)  GSV Knee           3.2  GSV Prox Calf      3.4  GSV Mid Calf       2.7  GSV Dist Calf      3.4  GSV Ankle          3.4     CONCLUSION: Impression: The Popliteal artery appears dampened and monophasic/continuous.  The Great Saphenous Vein is patent with  measurements ranging from 2.5 mm to 3.4 mm.  The Posterior Tibial Veins at the ankle measure 3.2 mm and 3.5 mm.  The Lateral Plantar Vein measures 4.4 mm in the proximal foot and 2.3 mm in the mid plantar foot.  The Medial Marginal Vein measures 2.9 mm on the dorsal foot.  Great Toe Pressure on 10/30/24 was unable to be obtained due to flat-line PPG tracing.  SIGNATURE: Electronically Signed by: TROY MATA DO on 2024-11-15 10:57:07 AM    Procedure: NM radrx ceretec abscess localization limited  Result Date: 11/2/2024  Narrative: CERETEC WHITE BLOOD CELL STUDY INDICATION:Chronic foot ulcer COMPARISON:   Right foot x-rays 10/30/2024 TECHNIQUE: The study was performed following the intravenous administration of 17.1 mCi Tc-99m labeled Ceretec white blood cells. Static images of the bilateral feet were acquired in multiple projections at 3.5 and 23 hours after injection. FINDINGS: 3.5-hour images demonstrate focal increased radiotracer uptake at the right first toe distally. This persists on the delayed 23-hour images. Also mild areas of superficial activity at the right fifth toe region plantar aspect, superior to the right ankle posteriorly and left fifth proximal metatarsal region probably related to superficial skin infection.     Impression: 1. Focal increased radiotracer uptake at the right first toe distally which persists on delayed imaging suspicious for infection and osteomyelitis. 2. Additional mild areas of superficial activity at the bilateral feet and superior to the right ankle probably related to superficial skin infection. The study was marked in EPIC for immediate notification. Workstation performed: [id number]     Procedure: VAS KARLI and waveform analysis, multiple levels  Result Date: 10/30/2024  Narrative:  [facility] REPORT CLINICAL: Indications:  Patient presents with non-healing right great toe wound. Operative History: 2024-10-30 Right Right lower extremity angiogram 2023-04-18 Right  Right lower extremity angiogram Coronary stent Right common femoral endarterectomy TAVR Risk Factors: The patient has history of Obesity, Hyperlipidemia, CAD and previous smoking (quit >10yrs ago). Clinical: Right Pressure:  121/ mm Hg, Left Pressure:  127/ mm Hg.  FINDINGS:  Segment       Ri  Left                         P   P  Ant. Tibial   62   0  Post. Tibial  60  85  Ankle         62  85  Metatarsal     0   0  Great Toe      0   0     CONCLUSION:  Impression: RIGHT LOWER LIMB Ankle/Brachial Index: 0.49 which is in the ischemic range. PPG/PVR Tracings are severely dampened. Monophasic waveform's at the ankle. Unable to obtain metatarsal pressure due to severely dampened waveforms. Unable to obtain great toe pressure due to severely dampened waveforms.  LEFT LOWER LIMB Ankle/Brachial Index: 0.67 which is in the moderate range PPG/PVR Tracings are severely dampened. Monophasic waveform's at the ankle. Unable to obtain metatarsal pressure due to severely dampened waveforms. Unable to obtain great toe pressure due to severely dampened waveforms.  In comparison to the study of 10/23/2024, there is no significant change.  SIGNATURE: Electronically Signed by: MARGE DO MD, RPVI on 2024-10-30 09:58:44 PM    Procedure: IR lower extremity angiogram  Result Date: 10/30/2024  Narrative: PROCEDURE: Right lower extremity angiography and interventions Procedural Personnel Attending physician(s): Dr. Rueda Pre-procedure diagnosis: Peripheral artery disease Post-procedure diagnosis: Same Indication: Nonhealing right great toe wound with osteomyelitis. Duplex ultrasound shows occlusion of right SFA stents. PROCEDURE SUMMARY: - Arterial access with ultrasound guidance - Abdominal aortogram with bilateral iliofemoral artery runoff - Right lower extremity arterial runoff angiogram - Recanalization of occluded right SFA with mechanical thrombectomy and angioplasty - Stent placement in the distal right SFA PROCEDURE DETAILS:  Pre-procedure Consent: Informed consent for the procedure including risks, benefits and alternatives was obtained and time-out was performed prior to the procedure. Preparation: The site was prepared and draped using maximal sterile barrier technique including cutaneous antisepsis. Anesthesia/sedation Level of anesthesia/sedation: General anesthesia Anesthesia/sedation administered by: Anesthesiology Total intra-service sedation time (minutes): 120 Access Local anesthesia was administered. The vessel was sonographically evaluated and judged to be patent. Real time ultrasound was used to visualize needle entry into the vessel and a permanent image was stored. A 5 Yoruba sheath was placed. Vessel accessed: Left common femoral artery Access technique: Micropuncture set with 21 gauge needle Aortography Indication for aortography: Diagnostic angiography - There was no prior catheter-based angiographic study available and a full diagnostic study was performed. The decision to intervene was based on the diagnostic study. Vessel catheterized: Abdominal aorta at or above the level of the renal arteries Findings: Patent abdominal aorta with patent bilateral iliofemoral arteries. Right lower extremity angiography and interventions Omni Flush catheter was advanced across the aortic bifurcation into the right CFA. Right lower extremity runoff artery angiogram was performed. 5 Yoruba sheath in the left CFA was exchanged for a 6 Yoruba 45 cm sheath which was advanced to the right CFA.  Patient was systemically heparinized using 5000 units followed by additional 2000 units later in the case. Using a combination of Glidewire advantage and Navicross catheter, the occluded right SFA stent was recanalized. Mechanical thrombectomy of the entire right SFA stent was performed using Rotarex mechanical thrombectomy device. Mild residual stenoses along the walls of the stent was treated using 5 mm x 200 mm and 5 mm x 150 mm [place] drug  coated stents. Proximal-most end of the stent was further treated using 6 mm x 40 mm conventional balloon. Post angioplasty angiogram showed patent stent. There was dissection in the distal SFA which was treated with placement of 6 mm x 40 mm Bernadine drug-eluting stent. Focal stenosis in the proximal popliteal artery was present which was nonflow-limiting and was not treated. Multifocal stenoses were present in the tibial arteries without a distal target for revascularization. Closure Left common femoral artery access was closed using Perclose closure device. Contrast Contrast agent: Visipaque Contrast volume (mL): 200 Radiation Dose Fluoroscopy time (minutes): 24.3 Reference air kerma (mGy): 653 Kerma area product (Gy-cm2): 99 Additional Details Estimated blood loss (mL): 50 Complications: No immediate complications.     Impression: 1. Abdominal aorta and bilateral iliofemoral arteries were patent. 2. Occluded right SFA recanalized followed by mechanical thrombectomy and 5 mm and 6 mm balloon angioplasties. 3. Dissection was present in the distal SFA following angioplasty. Additional 6 mm x 60 mm Bernadine drug-eluting stent placed in the distal SFA overlapping pre-existing stent. 4. Focal nonflow-limiting stenosis in the proximal right popliteal artery. Remainder of popliteal artery was patent. 4. Multifocal occlusions of right anterior tibial, posterior tibial, and peroneal arteries without distal target for revascularization. Plan: -Continue aspirin and Plavix. -Monitor for healing of right toe wound. Workstation performed: [id number]     Procedure: XR foot 3+ vw right  Result Date: 10/30/2024  Narrative: XR FOOT 3+ VW RIGHT INDICATION: Osteomyelitis right hallux. COMPARISON: Compared with 10/22/2024 FINDINGS: No acute fracture or dislocation. Hallux valgus deformity Small calcaneal spur and enthesopathy No lytic or blastic osseous lesion. Unremarkable soft tissues.     Impression: No radiographic signs of  osteomyelitis in the hallux. No acute osseous abnormality. Computerized Assisted Algorithm (CAA) may have been used to analyze all applicable images. Workstation performed: [id number]     Procedure: VAS ARTERIAL DUPLEX- LOWER LIMB BILATERAL  Result Date: 10/23/2024  Narrative:  [facility] REPORT CLINICAL: Indications:  Left Atherosclerosis with Ulceration [I70.25]. Patient presents with an ulcer on his left great toe. Operative History: 2023-04-18 Right Right lower extremity angiogram Coronary stent Right common femoral endarterectomy TAVR Risk Factors The patient has history of Obesity, Hyperlipidemia, CAD and previous smoking (quit >10yrs ago). Clinical Right Pressure:  132/ mm Hg, Left Pressure:  130/ mm Hg.  FINDINGS:  Segment                Right                   Left                                          Impression  PSV (cm/s)  Impression  PSV (cm/s)  Common Femoral Artery                      77                      57  Prox Profunda                              84                          Prox SFA               Occluded            69  Occluded           323  Mid SFA                Occluded                Occluded            19  Dist SFA               Occluded                                    16  Proximal Pop                              109                      17  Distal Pop                                 17                      22  Dist Post Tibial                           16                      20  Dist. Ant. Tibial      Occluded                Occluded                   CONCLUSION: Impression: RIGHT LOWER LIMB: High grade stenosis versus occlusion in the proximal, mid and distal superficial femoral artery with reconstitution distally. Ankle/Brachial index:   0.39 (Ischemic Range)  Prior 0.58 PVR/ PPG tracings are obliterated. Metatarsal pressure could not be recorded due to obliterated wave-form. Great toe pressure could not be recorded due to obliterated wave-form. Compared to previous  study on 07/26/23 , there is high grade stenosis versus occlusion in the proximal, mid and distal superficial femoral artery with reconstitution distally.  LEFT LOWER LIMB: High grade stenosis versus occlusion in the proximal and mid superficial femoral artery with reconstitution distally. Ankle/Brachial index:   Non-compressible.  Prior Non-compressible. PVR/ PPG tracings are obliterated. PVR/ PPG tracings are obliterated. Metatarsal pressure could not be recorded due to obliterated wave-form. Compared to previous study on 07/26/23, high grade stenosis versus occlusion in the proximal and mid superficial femoral artery with reconstitution distally.  SIGNATURE: Electronically Signed by: TROY MATA DO on 2024-10-23 05:14:59 PM    Procedure: XR toe great min 2 view RIGHT  Result Date: 10/22/2024  Narrative: XR TOE RIGHT GREAT MIN 2 VIEW INDICATION: wound, concern for osteo. COMPARISON: Radiograph July 18, 2022 FINDINGS: Irregularity of the soft tissues of the great toe in the region of the nailbed with small focus of lucency in the distal aspect of the first distal phalanx. There are degenerative changes of the foot. No lytic or blastic osseous lesion. Atherosclerotic vascular calcifications.     Impression: Irregularity of the soft tissues of the great toe as well as a small focus of lucency in the distal aspect of the first distal phalanx suspicious for osteomyelitis. MRI may be performed for further evaluation as clinically warranted. Computerized Assisted Algorithm (CAA) may have been used to analyze all applicable images. Workstation performed: [id number]       60 minutes total time spent on 12/2/2024 in caring for this patient including obtaining/reviewing history, symptom assessment and management, medication review / adjustment, psychosocial support, reviewing / ordering tests, medicines, imaging, procedures, supportive listening, anticipatory guidance, patient/family education, instructions for management,  "importance of adhering to treatment plan, risks/benefits of treatment(s), risk factor reduction, and documenting in the medical record. All of the patient's questions were answered during this discussion.    OMAIRA Carmona  [facility]  [phone]    Portions of this document may have been created using dictation software and as such some \"sound alike\" terms may have been generated by the system. Do not hesitate to contact me with any questions or clarifications.     "

## 2024-12-02 NOTE — TELEPHONE ENCOUNTER
"Safety checklist prior to [facility] will visit:    \"Thank you for inviting us to your home.  In order to ensure we arrive politely, and conduct ourselves safely in your home, please answer the following questions:\"    1 - Where should we park when we arrive?      street    2 - Are there security doors, bynum, or fences that we will need a code for?       No, go through main door and upstair case for patient's apartment    3 - Who else will be present at the visit to support you? Just patient       \"We want to conduct the visit with you in the way that feels most comfortable for you.  If we need to schedule around another person's time, please let us know.\"    4 - Do you have any pets or other animals in your home?       no       \"If you have a dog, please make sure they are crated, leashed, or placed in a separate room.  Birds, reptiles, and other small animals like gerbils should be kept in a safe enclosure.  This helps our team stay on track with your visit.  We are here to see you, not your pets!\"    5 - Do you have any firearms or other weapons in the home?       no       \"Please store and lock all weapons prior to our arrival.\"    6 - Do you feel acutely sick, or have you had recent sick contacts? no       \"If you have upper respiratory symptoms, such as cough, sneeze, congestion, or a cold, please use a mask for the safety of our team.  We will always mask for your safety as well.\"    7 - Are there any environmental hazards around or near your home now, or at the time of our visit?   denied   \"Environmental hazards may include exterior items like storm damage or road construction.  Internal hazards might be home renovations, or pests like ants, flies, or mice.\"    8 - Please have all medicines prescribed to you set up where they may be reviewed for safety.  Reviewed with patient  9 - If you use marijuana medically, or any recreational drugs, please store these out of sight prior to our arrival. "  Reviewed with patient

## 2024-12-02 NOTE — PROGRESS NOTES
Royce Rene was seen in the home today.  Patient was seen unaccompanied. Patient  provided all pertinent information today.    Royce Rene 's medications were verified today with patient and blister pack from pharmacy. Royce and pharmacy (via blister pack) is managing the medications. All medications are up to date.     Allergies verified. No new allergies.     All medical, surgical, family and social history were reviewed with patient. There are no updates to patient's history.    Next visit with RN and provider in 1 month.    All questions and concerns were addressed.  Patient was appreciative of the visit.

## 2024-12-03 ENCOUNTER — TELEPHONE (OUTPATIENT)
Age: 79
End: 2024-12-03

## 2024-12-03 RX ORDER — OXYCODONE HYDROCHLORIDE 5 MG/1
5 TABLET ORAL 2 TIMES DAILY PRN
Qty: 60 TABLET | Refills: 0 | Status: SHIPPED | OUTPATIENT
Start: 2024-12-03

## 2024-12-03 NOTE — ASSESSMENT & PLAN NOTE
CT scan from 7/18/2024:  Stable mildly hyperdense, partially calcified, likely enhancing lesion involving the postcentral gyrus of the right parietal lobe, without surrounding vasogenic edema, and with corresponding activity on prior PSMA PET/CT from 4/25/2024, concerning for a   stable metastasis in this region.  Mildly hyperdense lesion likely present involving the parafalcine right frontal lobe, and which previously had increased radiotracer activity in this region on prior Axumin PET/CT from 7/25/2022 although minimal activity is noted in this region on the   subsequent PET CT from 4/25/2024. This likely represents a stable treated metastasis in this region.  Relatively subtle, but likely stable mildly hyperdense/enhancing lesions involving the right cerebellar hemisphere, 1 of which appears partially calcified posteriorly, and which had corresponding radiotracer activity in this region on PET/CT from   4/25/2024, concerning for intracranial metastases. No significant vasogenic edema is noted.  Stable sclerotic lesion involving the right occipital bone, indicative of an osseous metastasis

## 2024-12-03 NOTE — TELEPHONE ENCOUNTER
Jazmine with [facility] insurer wellcare visit called with concerns for patient that patient is overwhelmed at home and needs more assistance with ADLs. Patient has looked like he has applied to program for assistance but looks like there is a delay and patient does not wish to pay out of pocket for these services.    Patient has right toe and left foot arterial ulcer with dressing dates 11/20 on them according to Jazmine. Last home VN note was 11/20 for wound care. Unclear why nursing services ended and patient was not re certified for ongoing wound care/mangement. Is patient able to go to outpatient wound care? Son is supportive and involved as much as schedule allows.     Pt was seen by Palliative Care yesterday and wound care was not addressed.Please follow up for possible scheduling OV.

## 2024-12-03 NOTE — ASSESSMENT & PLAN NOTE
Current regimen:  Tylenol 1000mg TID PRN  Oxycodone 5mg BID PRN  Bowel regimen to prevent OIC:  Reports that he gets diarrhea from his chemo treatments- will use Imodium PRN  Opioid agreement reviewed and signed: on file with  Palliative Care  I have reviewed the patient's controlled substance dispensing history in the Prescription Drug Monitoring Program in compliance with the [facility] regulations before prescribing any controlled substance

## 2024-12-03 NOTE — ASSESSMENT & PLAN NOTE
Patient with a history of metastatic prostate cancer, [facility] Dr[facility] Hernandez.    S/p radiation and chemotherapy

## 2024-12-03 NOTE — ASSESSMENT & PLAN NOTE
Royce follows with palliative care for psychosocial support and symptom management of metastatic cancer, CHF   Symptoms - pain   ACP -  nothing on file   RTC - 4 weeks.    Patient lives an apartment    Son- Rich lives in apartment above.  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   Was working until about 3 years ago.   DME- electric scooter, chair left.    Patient does have insight into his medical conditions, he lacks ability to coordinate self cares ( hygiene, unkept living space)

## 2024-12-05 RX ORDER — SODIUM CHLORIDE 9 MG/ML
20 INJECTION, SOLUTION INTRAVENOUS ONCE
Status: CANCELLED | OUTPATIENT
Start: 2024-12-12

## 2024-12-10 ENCOUNTER — APPOINTMENT (OUTPATIENT)
Dept: LAB | Facility: HOSPITAL | Age: 79
End: 2024-12-10
Payer: COMMERCIAL

## 2024-12-10 DIAGNOSIS — Z76.89 PREVENTION OF CHEMOTHERAPY-INDUCED NEUTROPENIA: ICD-10-CM

## 2024-12-10 DIAGNOSIS — C61 PROSTATE CANCER (HCC): ICD-10-CM

## 2024-12-10 LAB
ALBUMIN SERPL BCG-MCNC: 3.8 G/DL (ref 3.5–5)
ALP SERPL-CCNC: 85 U/L (ref 34–104)
ALT SERPL W P-5'-P-CCNC: 35 U/L (ref 7–52)
ANION GAP SERPL CALCULATED.3IONS-SCNC: 8 MMOL/L (ref 4–13)
AST SERPL W P-5'-P-CCNC: 21 U/L (ref 13–39)
BASOPHILS # BLD AUTO: 0.03 THOUSANDS/ÂΜL (ref 0–0.1)
BASOPHILS NFR BLD AUTO: 0 % (ref 0–1)
BILIRUB SERPL-MCNC: 0.56 MG/DL (ref 0.2–1)
BUN SERPL-MCNC: 32 MG/DL (ref 5–25)
CALCIUM SERPL-MCNC: 8.4 MG/DL (ref 8.4–10.2)
CHLORIDE SERPL-SCNC: 109 MMOL/L (ref 96–108)
CO2 SERPL-SCNC: 23 MMOL/L (ref 21–32)
CREAT SERPL-MCNC: 0.96 MG/DL (ref 0.6–1.3)
EOSINOPHIL # BLD AUTO: 0.03 THOUSAND/ÂΜL (ref 0–0.61)
EOSINOPHIL NFR BLD AUTO: 0 % (ref 0–6)
ERYTHROCYTE [DISTWIDTH] IN BLOOD BY AUTOMATED COUNT: 15 % (ref 11.6–15.1)
GFR SERPL CREATININE-BSD FRML MDRD: 74 ML/MIN/1.73SQ M
GLUCOSE SERPL-MCNC: 112 MG/DL (ref 65–140)
HCT VFR BLD AUTO: 33.2 % (ref 36.5–49.3)
HGB BLD-MCNC: 10.9 G/DL (ref 12–17)
IMM GRANULOCYTES # BLD AUTO: 0.16 THOUSAND/UL (ref 0–0.2)
IMM GRANULOCYTES NFR BLD AUTO: 1 % (ref 0–2)
LYMPHOCYTES # BLD AUTO: 0.71 THOUSANDS/ÂΜL (ref 0.6–4.47)
LYMPHOCYTES NFR BLD AUTO: 4 % (ref 14–44)
MCH RBC QN AUTO: 30.9 PG (ref 26.8–34.3)
MCHC RBC AUTO-ENTMCNC: 32.8 G/DL (ref 31.4–37.4)
MCV RBC AUTO: 94 FL (ref 82–98)
MONOCYTES # BLD AUTO: 0.71 THOUSAND/ÂΜL (ref 0.17–1.22)
MONOCYTES NFR BLD AUTO: 4 % (ref 4–12)
NEUTROPHILS # BLD AUTO: 14.37 THOUSANDS/ÂΜL (ref 1.85–7.62)
NEUTS SEG NFR BLD AUTO: 91 % (ref 43–75)
NRBC BLD AUTO-RTO: 0 /100 WBCS
OVALOCYTES BLD QL SMEAR: PRESENT
PLATELET # BLD AUTO: 314 THOUSANDS/UL (ref 149–390)
PLATELET BLD QL SMEAR: ADEQUATE
PMV BLD AUTO: 9.8 FL (ref 8.9–12.7)
POTASSIUM SERPL-SCNC: 4.2 MMOL/L (ref 3.5–5.3)
PROT SERPL-MCNC: 6.3 G/DL (ref 6.4–8.4)
RBC # BLD AUTO: 3.53 MILLION/UL (ref 3.88–5.62)
RBC MORPH BLD: PRESENT
SODIUM SERPL-SCNC: 140 MMOL/L (ref 135–147)
WBC # BLD AUTO: 16.01 THOUSAND/UL (ref 4.31–10.16)

## 2024-12-10 PROCEDURE — 85025 COMPLETE CBC W/AUTO DIFF WBC: CPT

## 2024-12-10 PROCEDURE — 36415 COLL VENOUS BLD VENIPUNCTURE: CPT

## 2024-12-10 PROCEDURE — 80053 COMPREHEN METABOLIC PANEL: CPT

## 2024-12-12 ENCOUNTER — OFFICE VISIT (OUTPATIENT)
Age: 79
End: 2024-12-12
Payer: COMMERCIAL

## 2024-12-12 ENCOUNTER — TELEPHONE (OUTPATIENT)
Dept: INFUSION CENTER | Facility: CLINIC | Age: 79
End: 2024-12-12

## 2024-12-12 ENCOUNTER — HOSPITAL ENCOUNTER (OUTPATIENT)
Dept: INFUSION CENTER | Facility: CLINIC | Age: 79
Discharge: HOME | End: 2024-12-12
Payer: OTHER GOVERNMENT

## 2024-12-12 VITALS
HEART RATE: 80 BPM | SYSTOLIC BLOOD PRESSURE: 122 MMHG | RESPIRATION RATE: 18 BRPM | DIASTOLIC BLOOD PRESSURE: 80 MMHG | HEIGHT: 67 IN | BODY MASS INDEX: 35.55 KG/M2

## 2024-12-12 VITALS — BODY MASS INDEX: 35.63 KG/M2 | HEIGHT: 67 IN | WEIGHT: 227 LBS

## 2024-12-12 DIAGNOSIS — I96 DRY GANGRENE (HCC): Primary | ICD-10-CM

## 2024-12-12 DIAGNOSIS — E11.621 DIABETIC ULCER OF TOE OF RIGHT FOOT ASSOCIATED WITH TYPE 2 DIABETES MELLITUS, WITH NECROSIS OF BONE (HCC): ICD-10-CM

## 2024-12-12 DIAGNOSIS — C61 PROSTATE CANCER (HCC): Primary | ICD-10-CM

## 2024-12-12 DIAGNOSIS — E11.42 DIABETIC POLYNEUROPATHY ASSOCIATED WITH TYPE 2 DIABETES MELLITUS (HCC): ICD-10-CM

## 2024-12-12 DIAGNOSIS — L03.031 CELLULITIS OF TOE, RIGHT: ICD-10-CM

## 2024-12-12 DIAGNOSIS — I73.9 PAD (PERIPHERAL ARTERY DISEASE) (HCC): ICD-10-CM

## 2024-12-12 DIAGNOSIS — Z76.89 PREVENTION OF CHEMOTHERAPY-INDUCED NEUTROPENIA: ICD-10-CM

## 2024-12-12 DIAGNOSIS — L97.514 DIABETIC ULCER OF TOE OF RIGHT FOOT ASSOCIATED WITH TYPE 2 DIABETES MELLITUS, WITH NECROSIS OF BONE (HCC): ICD-10-CM

## 2024-12-12 PROCEDURE — 96377 APPLICATON ON-BODY INJECTOR: CPT

## 2024-12-12 PROCEDURE — 96367 TX/PROPH/DG ADDL SEQ IV INF: CPT

## 2024-12-12 PROCEDURE — 96375 TX/PRO/DX INJ NEW DRUG ADDON: CPT

## 2024-12-12 PROCEDURE — 99214 OFFICE O/P EST MOD 30 MIN: CPT | Performed by: PODIATRIST

## 2024-12-12 PROCEDURE — 87205 SMEAR GRAM STAIN: CPT | Performed by: PODIATRIST

## 2024-12-12 PROCEDURE — 87147 CULTURE TYPE IMMUNOLOGIC: CPT | Performed by: PODIATRIST

## 2024-12-12 PROCEDURE — 87070 CULTURE OTHR SPECIMN AEROBIC: CPT | Performed by: PODIATRIST

## 2024-12-12 PROCEDURE — 96413 CHEMO IV INFUSION 1 HR: CPT

## 2024-12-12 RX ORDER — LEVOFLOXACIN 500 MG/1
500 TABLET, FILM COATED ORAL EVERY 24 HOURS
Qty: 14 TABLET | Refills: 0 | Status: SHIPPED | OUTPATIENT
Start: 2024-12-12 | End: 2024-12-26

## 2024-12-12 RX ORDER — SODIUM CHLORIDE 9 MG/ML
20 INJECTION, SOLUTION INTRAVENOUS ONCE
Status: COMPLETED | OUTPATIENT
Start: 2024-12-12 | End: 2024-12-12

## 2024-12-12 RX ADMIN — DIPHENHYDRAMINE HYDROCHLORIDE 25 MG: 50 INJECTION, SOLUTION INTRAMUSCULAR; INTRAVENOUS at 11:11

## 2024-12-12 RX ADMIN — SODIUM CHLORIDE 20 ML/HR: 0.9 INJECTION, SOLUTION INTRAVENOUS at 10:48

## 2024-12-12 RX ADMIN — FAMOTIDINE 20 MG: 10 INJECTION INTRAVENOUS at 11:33

## 2024-12-12 RX ADMIN — SODIUM CHLORIDE 32 MG: 0.9 INJECTION, SOLUTION INTRAVENOUS at 12:03

## 2024-12-12 RX ADMIN — ONDANSETRON 8 MG: 2 INJECTION, SOLUTION INTRAMUSCULAR; INTRAVENOUS at 10:48

## 2024-12-12 RX ADMIN — PEGFILGRASTIM 6 MG: KIT SUBCUTANEOUS at 13:03

## 2024-12-12 NOTE — PROGRESS NOTES
Received for Jevtana. No complaints offered. Order for Jevtana clarified with Dr Hernandez/Molly. Note from 11/12/24 initially stated dose is 20mg/m2. Verified that dose is 15mg/m2 as stated in treatment plan. Dr Hernandez  addended  note to reflect same. [facility] accessed without issue. Brisk blood return noted, flushed well. Premeds infusing per order.

## 2024-12-12 NOTE — PROGRESS NOTES
Name: Royce Rene      : 1945      MRN: [id number]  Encounter Provider: Chico Harper DO  Encounter Date: 2024   Enc[facility] [place]  :  Assessment & Plan  PAD (peripheral artery disease) (HCC)  79-year-old male referred for PAD.  He has a history of right femoral endarterectomy a few years ago with Dr. Madison as well as history of right SFA angioplasty and stent.  He was admitted to hospital at [facility] about 6 weeks ago with a gangrenous right great toe he underwent a right leg angiogram with recanalization of his right SFA stents as well as popliteal intervention.  He unfortunately has severe three-vessel tibial occlusive disease and it was not feasible to reestablish inline flow to his foot.  It is now been 6 weeks since his procedure he still has tissue loss on his right great toe the distal portion of the toe has dry gangrene.  Of note he does have a history of metastatic prostate cancer he is receiving chemotherapy.  He denies symptoms in his left leg he denies prior interventions in his left leg.  I reviewed his imaging as well as his vein mapping and anatomically he is a candidate for limb flow.  He appears to have a proximal stump of his right posterior tibial artery that could be used as an inflow source and the vein mapping is adequate for the procedure.  I believe without revascularization I think he is at high risk for major amputation I described the procedure to him in detail including risks benefits and alternative therapies and he wishes to proceed.  Will obtain written consent the day of the procedure and we will schedule him as soon as possible this could be scheduled at either the [place] or [place] hybrid room or in the [facility] interventional radiology suite.    Orders:    IR lower extremity angiogram; Future        History of Present Illness     Patient presents today to discuss possible limb flow procedure. LE vein mapping done 24. R great  "toe wound s/p RLE angiogram 10/30/24. Very minimally ambulatory.     HPI  Royce Rene is a 79 y.o. male   History obtained from: patient    Review of Systems   Constitutional: Negative.    HENT: Negative.     Eyes: Negative.    Respiratory: Negative.     Cardiovascular: Negative.    Gastrointestinal: Negative.    Endocrine: Negative.    Genitourinary: Negative.    Musculoskeletal:  Positive for gait problem.   Skin:  Positive for wound (R great toe wound).   Allergic/Immunologic: Negative.    Neurological:  Positive for weakness and numbness.   Hematological:  Bruises/bleeds easily.   Psychiatric/Behavioral: Negative.       I have reviewed the ROS above and made changes as needed.         Objective   /74 (BP Location: Left arm, Patient Position: Sitting)   Pulse 88   Ht 5' 7\" (1.702 m)   Wt 103 kg (227 lb)   BMI 35.55 kg/m²      Physical Exam    General  Exam: alert, awake, oriented, no distress, consistent with stated age    Integumentary  Exam: warm, dry, no gross lesions, no bruises and normal color    Head and Neck  Exam: supple, no bruits, trachea midline, no JVD, no mass or palpable nodes    Chest and Lung  Exam: chest normal without deformity, bilaterally expansive, clear to auscultation    Cardiovascular  Exam: regular rate, regular rhythm, no murmurs, no rubs or gallops    Adbomen  Exam: soft, non-tender, non-distended, no pulsatile abdominal masses, no abdominal bruit    Peripheral Vascular  Exam: no clubbing of the digits of the upper extremity, no cyanosis, no edema, both feet are warm, radial pulses 2+ bilaterally, skin well perfused, without and no varicosities.    Upper Extremity:  Palpation: Radial pulse- Bilateral 2+    Lower Extremity:  Palpation: Femoral pulse- Bilateral 2+             Non-palpable distal pulses b/l    Dry gangrene right 1st toe     Neurologic  Exam:alert, non-focal, oriented x 3, cranial nerves II-XII grossly intact      Operative Scheduling " Information:    Hospital:  [facility] or [facility]    Physician:  Meredith    Surgery: Right leg angiogram, LimFlow procedure    Urgency:  Standard    Level:  Level 3: Outpatients to be scheduled for elective surgery than can be delayed up to 4 weeks without reasonable expectation of detriment to patient    ASAP   Add on [facility] 12/17 if possible    Case Length:  Normal    Post-op Bed:  Outpatient    OR Table:  IR    Equipment Needs:  Rep: LimFlow (Shaq Wynn)    Medication Instructions:  Aspirin:   Continue (do not hold)  Plavix:  Continue (do not hold)    Hydration:  No    Contrast Allergy:  no          Administrative Statements   I have spent a total time of 35 minutes in caring for this patient on the day of the visit/encounter including Diagnostic results, Prognosis, Risks and benefits of tx options, Importance of tx compliance, Reviewing / ordering tests, medicine, procedures  , and Obtaining or reviewing history  .

## 2024-12-12 NOTE — TELEPHONE ENCOUNTER
Attempted to call patient as it appears that transportation was not arranged for this patient's chemo appointment. Spoke with patient, and arranged for a lyft to pick him up at an agreed upon time. Upon the lyft's arrival, the patient was not available and the lyft cancelled. I attempted to the call the patient twice following this, but he did not  and I was not able to leave a voicemail. I additionally called the patient's son, but was not able to get a hold of him either. Left a detailed voice message asking for a call back from the son.

## 2024-12-12 NOTE — PROGRESS NOTES
Patient tolerated treatment without incident. Port with excellent blood return noted prior to flushing and de-accessing as per protocol. Neulasta on pro pump placed onto right arm with green indicator light noted flashing. Patient aware of date and time to remove neulasta pump. Next appointment confirmed for 1/2/2024 at 0830. AVS printed and given to patient.

## 2024-12-12 NOTE — PROGRESS NOTES
Assessment/Plan: Dry gangrene right hallux secondary to severe peripheral artery disease right lower extremity.  Diabetic neuropathy.  Mild digital cellulitis right hallux.  Neuropathy.  Probable osteomyelitis distal phalanx right hallux.    Plan.  Chart reviewed.  PCP notes reviewed.  Doppler reviewed results reviewed.  Patient at this time has flatline response with negative digital flow to digits right foot.  He states he has appointment to see vascular surgery tomorrow.  Urgent referral placed.  At this time we recommend admission to hospital however patient declines.  He would like to see vascular surgery first.  This is AMA.  Patient remain on Levaquin.    Today dry sterile dressing applied, Betadine wet-to-dry.  Patient states he is no longer receiving VNA services at home.  He will try to bandage daily.  Again, we recommend hospitalization for vascular treatment and probable surgical intervention/amputation at unspecified level.  Watch for signs of infection.  Watch for signs of sepsis.    Patient is at high risk for limb loss.         Diagnoses and all orders for this visit:    Dry gangrene (HCC)  -     levofloxacin (LEVAQUIN) 500 mg tablet; Take 1 tablet (500 mg total) by mouth every 24 hours for 14 days  -     Ambulatory referral to Vascular Surgery; Future    Diabetic ulcer of toe of right foot associated with type 2 diabetes mellitus, with necrosis of bone (HCC)  -     levofloxacin (LEVAQUIN) 500 mg tablet; Take 1 tablet (500 mg total) by mouth every 24 hours for 14 days  -     Ambulatory referral to Vascular Surgery; Future    PAD (peripheral artery disease) ([facility])  -     Ambulatory referral to Vascular Surgery; Future    Diabetic polyneuropathy associated with type 2 diabetes mellitus ([facility])    Cellulitis of toe, right  -     levofloxacin (LEVAQUIN) 500 mg tablet; Take 1 tablet (500 mg total) by mouth every 24 hours for 14 days          Subjective: Patient presents for evaluation.  Patient has history  of hospitalization for infection of right foot.  Patient has been taking Levaquin as directed.  No history of fever or night sweats.  He presents in wheelchair.    No Known Allergies      Current Outpatient Medications:     levofloxacin (LEVAQUIN) 500 mg tablet, Take 1 tablet (500 mg total) by mouth every 24 hours for 14 days, Disp: 14 tablet, Rfl: 0    Accu-Chek FastClix Lancets MISC, TEST 2-3 TIMES AS DIRECTED (Patient not taking: Reported on 12/2/2024), Disp: , Rfl:     acetaminophen (TYLENOL) 500 mg tablet, Take 2 tablets (1,000 mg total) by mouth 3 (three) times a day as needed for mild pain, Disp: , Rfl:     Alcohol Swabs (Alcohol Pads) 70 % PADS, USE 2-3 TIMES AS DIRECTED., Disp: , Rfl:     aspirin 81 mg chewable tablet, Chew 1 tablet (81 mg total) daily, Disp: 90 tablet, Rfl: 1    atorvastatin (LIPITOR) 80 mg tablet, Take 1 tablet (80 mg total) by mouth daily with dinner, Disp: 90 tablet, Rfl: 1    bismuth subsalicylate (PEPTO BISMOL) 262 MG chewable tablet, Chew 524 mg As needed, Disp: , Rfl:     Blood Glucose Monitoring Suppl (Accu-Chek Guide Me) w/Device KIT, USE AS DIRECTED (Patient not taking: Reported on 12/2/2024), Disp: 1 kit, Rfl: 1    clopidogrel (PLAVIX) 75 mg tablet, Take 1 tablet (75 mg total) by mouth daily, Disp: 90 tablet, Rfl: 1    cyanocobalamin (VITAMIN B-12) 1000 MCG tablet, Take 1 tablet (1,000 mcg total) by mouth daily, Disp: 90 tablet, Rfl: 1    Empagliflozin (Jardiance) 10 MG TABS tablet, TAKE 1 TABLET (10 MG TOTAL) BY MOUTH EVERY MORNING, Disp: 30 tablet, Rfl: 5    ezetimibe (ZETIA) 10 mg tablet, Take 1 tablet (10 mg total) by mouth daily, Disp: 90 tablet, Rfl: 1    famotidine (PEPCID) 20 mg tablet, Take 1 tablet (20 mg total) by mouth daily, Disp: 30 tablet, Rfl: 2    ferrous sulfate 325 (65 Fe) mg tablet, Take 1 tablet (325 mg total) by mouth daily, Disp: 90 tablet, Rfl: 1    furosemide (LASIX) 20 mg tablet, TAKE 2 TABLETS (40 MG TOTAL) BY MOUTH DAILY, Disp: 60 tablet, Rfl: 5     gabapentin (NEURONTIN) 300 mg capsule, Take 2 capsules (600 mg total) by mouth 2 (two) times a day, Disp: 120 capsule, Rfl: 11    glucose blood (Accu-Chek Guide) test strip, TEST 2-3 TIMES AS DIRECTED (Patient not taking: Reported on 12/2/2024), Disp: 100 each, Rfl: 5    Lancet Devices (Lancing Device) MISC, TID (Patient not taking: Reported on 12/2/2024), Disp: 100 each, Rfl: 2    loperamide (IMODIUM) 2 mg capsule, Take 1 capsule (2 mg total) by mouth 4 (four) times a day as needed for diarrhea, Disp: 30 capsule, Rfl: 2    losartan (COZAAR) 25 mg tablet, TAKE 0.5 TABLETS (12.5 MG TOTAL) BY MOUTH DAILY, Disp: 45 tablet, Rfl: 1    methocarbamol (ROBAXIN) 500 mg tablet, TAKE 1 TABLET (500 MG TOTAL) BY MOUTH 3 (THREE) TIMES A DAY, Disp: 90 tablet, Rfl: 0    metoprolol succinate (TOPROL-XL) 25 mg 24 hr tablet, Take 1 tablet (25 mg total) by mouth daily, Disp: 90 tablet, Rfl: 2    ondansetron (ZOFRAN) 4 mg tablet, Take 1 tablet (4 mg total) by mouth every 8 (eight) hours as needed for nausea or vomiting, Disp: 20 tablet, Rfl: 1    oxyCODONE (Roxicodone) 5 immediate release tablet, Take 1 tablet (5 mg total) by mouth 2 (two) times a day as needed (wound pain) Max Daily Amount: 10 mg, Disp: 60 tablet, Rfl: 0    pentoxifylline (TRENtal) 400 mg ER tablet, Take 1 tablet (400 mg total) by mouth 3 (three) times a day with meals, Disp: 90 tablet, Rfl: 0    polyethylene glycol (GLYCOLAX) 17 GM/SCOOP powder, Take 17 g by mouth every other day Monday Wednesday Friday (Patient taking differently: Take 17 g by mouth every other day. Monday Wednesday Friday PRN for constipation add dissolve in 8 ounces of liquid of your choice), Disp: 510 g, Rfl: 0    potassium chloride (Klor-Con M20) 20 mEq tablet, TAKE 1 TABLET (20 MEQ TOTAL) BY MOUTH DAILY, Disp: 30 tablet, Rfl: 5    predniSONE 5 mg tablet, TAKE 1 TABLET (5 MG TOTAL) BY MOUTH 2 (TWO) TIMES A DAY WITH MEALS, Disp: 60 tablet, Rfl: 5  No current facility-administered medications for  this visit.    Facility-Administered Medications Ordered in Other Visits:     alteplase (CATHFLO) injection 2 mg, 2 mg, Intracatheter, Q1MIN PRN, Jung Hernandez MD    Patient Active Problem List   Diagnosis    Chronic combined systolic and diastolic CHF (congestive heart failure) (HCC)    S/P AVR    Anemia    Ischemic cardiomyopathy    Ischemic leg pain    Prostate cancer (HCC)    CAD (coronary artery disease)    Urinary retention    PAD (peripheral artery disease) (HCC)    Port-A-Cath in place    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)    Embolism and 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 (HCC)    Depression, recurrent (HCC)    Cancer-related pain    Palliative care patient    Ambulatory dysfunction    Peripheral neuropathy    Financial problems    Moderate vascular dementia (HCC)    Class 2 severe obesity due to excess calories with serious comorbidity and body mass index (BMI) of 36.0 to 36.9 in adult (HCC)    Elevated liver enzymes    Proctitis    Frail elder // vulnerable adult    Prevention of chemotherapy-induced neutropenia    Osteomyelitis (HCC)    Ischemic pain of foot at rest    Ulcer of right foot with fat layer exposed (HCC)    Ulcer of left foot, limited to breakdown of skin (HCC)    Dry gangrene (HCC)    Vitamin B12 deficiency    Mixed hyperlipidemia    Exudative age-related macular degeneration, left eye, with active choroidal neovascularization (HCC)    Facial lesion          Patient ID: Royce Rene is a 79 y.o. male.    HPI    The following portions of the patient's history were reviewed and updated as appropriate:     family history includes Cancer in his mother.      reports that he quit smoking about 22 years ago. His smoking use included cigarettes. He started smoking about 62 years ago. He has been exposed to tobacco smoke. He has never used smokeless tobacco. He reports that he does not currently use alcohol. He reports that he does not currently use drugs.    Vitals:    12/12/24  1538   BP: 122/80   Pulse: 80   Resp: 18       Review of Systems      Objective:  Patient's shoes and socks removed.   Foot ExamPhysical Exam        General  General Appearance: appears stated age and healthy   Orientation: alert and oriented to person, place, and time   Gait: antalgic   Assistance: wheelchair use         Right Foot/Ankle      Inspection and Palpation  Tenderness: bony tenderness and great toe interphalangeal joint   Swelling: dorsum   Arch: pes cavus  Claw Toes: first toe and second toe     Neurovascular  Dorsalis pedis: absent  Posterior tibial: absent  Saphenous nerve sensation: absent  Tibial nerve sensation: absent  Superficial peroneal nerve sensation: absent  Deep peroneal nerve sensation: absent  Sural nerve sensation: absent        Left Foot/Ankle       Inspection and Palpation  Swelling: dorsum   Arch: pes cavus     Neurovascular  Dorsalis pedis: 1+  Posterior tibial: 1+  Saphenous nerve sensation: absent  Tibial nerve sensation: absent  Superficial peroneal nerve sensation: absent  Deep peroneal nerve sensation: absent  Sural nerve sensation: absent        Physical Exam  Vitals and nursing note reviewed.   Cardiovascular:      Rate and Rhythm: Normal rate and regular rhythm.      Pulses:           Dorsalis pedis pulses are absent on the right side and 1+ on the left side.        Posterior tibial pulses are absent on the right side and 1+ on the left side.   Musculoskeletal:      Right foot: Bony tenderness present.   Feet:      Comments: Right hallux demonstrates dry gangrene with eschar distal aspect of toe.  Nailbed is ulcerated.  Mild surrounding erythema.  Negative pus.  Negative abscess.  Ceretec scan positive for osteomyelitis.  Skin:     Capillary Refill: Capillary refill takes more than 3 seconds.

## 2024-12-13 ENCOUNTER — TELEPHONE (OUTPATIENT)
Dept: VASCULAR SURGERY | Facility: CLINIC | Age: 79
End: 2024-12-13

## 2024-12-13 ENCOUNTER — OFFICE VISIT (OUTPATIENT)
Dept: VASCULAR SURGERY | Facility: CLINIC | Age: 79
End: 2024-12-13
Payer: COMMERCIAL

## 2024-12-13 VITALS
HEIGHT: 67 IN | HEART RATE: 88 BPM | WEIGHT: 227 LBS | BODY MASS INDEX: 35.63 KG/M2 | DIASTOLIC BLOOD PRESSURE: 74 MMHG | SYSTOLIC BLOOD PRESSURE: 116 MMHG

## 2024-12-13 DIAGNOSIS — I73.9 PAD (PERIPHERAL ARTERY DISEASE) (HCC): Primary | ICD-10-CM

## 2024-12-13 DIAGNOSIS — E11.621 DIABETIC ULCER OF TOE OF RIGHT FOOT ASSOCIATED WITH TYPE 2 DIABETES MELLITUS, WITH NECROSIS OF BONE (HCC): ICD-10-CM

## 2024-12-13 DIAGNOSIS — L97.514 DIABETIC ULCER OF TOE OF RIGHT FOOT ASSOCIATED WITH TYPE 2 DIABETES MELLITUS, WITH NECROSIS OF BONE (HCC): ICD-10-CM

## 2024-12-13 DIAGNOSIS — I96 DRY GANGRENE (HCC): ICD-10-CM

## 2024-12-13 PROCEDURE — 99215 OFFICE O/P EST HI 40 MIN: CPT | Performed by: SURGERY

## 2024-12-13 NOTE — TELEPHONE ENCOUNTER
REMINDER: Under Reason For Call, comments MUST be formatted as:   FEY/RTLEG LIMB FLOW    Special Instructions / FYI: NO CLEARANCES LEVEL 3    Consent: Consent will be signed day of procedure.    For Surgical Clearances     Levels   1-3   ROUTE this encounter to [facility] Clearance Pool (AND)   [facility] Surgery Coordinator Pool     Level   4   ROUTE this encounter to [facility] Surgery Coordinator Pool       HYDRATION CLEARANCES   ONLY ROUTE TO  [facility] Clearance Pool       Yes, I have ROUTED this encounter to [facility] Surgery Coordinator and/or [facility] Clearance Pool.

## 2024-12-13 NOTE — ASSESSMENT & PLAN NOTE
79-year-old male referred for PAD.  He has a history of right femoral endarterectomy a few years ago with Dr. Madison as well as history of right SFA angioplasty and stent.  He was admitted to hospital at [facility] about 6 weeks ago with a gangrenous right great toe he underwent a right leg angiogram with recanalization of his right SFA stents as well as popliteal intervention.  He unfortunately has severe three-vessel tibial occlusive disease and it was not feasible to reestablish inline flow to his foot.  It is now been 6 weeks since his procedure he still has tissue loss on his right great toe the distal portion of the toe has dry gangrene.  Of note he does have a history of metastatic prostate cancer he is receiving chemotherapy.  He denies symptoms in his left leg he denies prior interventions in his left leg.  I reviewed his imaging as well as his vein mapping and anatomically he is a candidate for limb flow.  He appears to have a proximal stump of his right posterior tibial artery that could be used as an inflow source and the vein mapping is adequate for the procedure.  I believe without revascularization I think he is at high risk for major amputation I described the procedure to him in detail including risks benefits and alternative therapies and he wishes to proceed.  Will obtain written consent the day of the procedure and we will schedule him as soon as possible this could be scheduled at either the [facility] or [facility] hybrid room or in the [facility] interventional radiology suite.    Orders:    IR lower extremity angiogram; Future

## 2024-12-14 LAB
BACTERIA WND AEROBE CULT: NORMAL
GRAM STN SPEC: NORMAL

## 2024-12-16 ENCOUNTER — PREP FOR PROCEDURE (OUTPATIENT)
Dept: VASCULAR SURGERY | Facility: CLINIC | Age: 79
End: 2024-12-16

## 2024-12-16 DIAGNOSIS — M79.604 PAIN OF RIGHT LOWER EXTREMITY: ICD-10-CM

## 2024-12-16 DIAGNOSIS — I73.9 PAD (PERIPHERAL ARTERY DISEASE) (HCC): Primary | ICD-10-CM

## 2024-12-16 DIAGNOSIS — I99.8 ISCHEMIC LEG PAIN: ICD-10-CM

## 2024-12-16 DIAGNOSIS — M79.606 ISCHEMIC LEG PAIN: ICD-10-CM

## 2024-12-16 NOTE — TELEPHONE ENCOUNTER
Verified patient's insurance   CONFIRMED - Patient's insurance is [employer]   Is patient requesting a call when authorization has been obtained? Patient did not request a call.    Surgery Date: 01/02/2025  Primary Surgeon: PABLO // Chico Harper (NPI: [id number])  Assisting Surgeon: Not Applicable (N/A)  Facility: [facility] (Tax: [id number] / NPI: [id number])  Inpatient / Outpatient: Outpatient  Level: 3    Clearance Received: No clearance ordered.  Consent Received: Consent will be signed day of procedure.  Medication Hold / Last Dose:  No med holds on ASA and PLAVIX   IR Notified:  EMAILED 01/02/2025  Rep. Notified:  Elieser Wynn   Equipment Needs: Not Applicable (N/A)  Vas Lab Requested: Not Applicable (N/A)  Patient Contacted: 12/16/2025    Diagnosis: i73.9  Procedure/ CPT Code(s): Angiogram // CPT: 86945, 93004, 77863, and LimbFlow - 0620T // Procedure to take place in OR [Auth/ Cert Based]    For varicose vein related procedures:   Last LEVDR: Not Applicable (N/A)  CEAP Classification: Not Applicable (N/A)  VCSS: Not Applicable (N/A)    Post Operative Date/ Time: 01/31/2025 , 2:00pm [facility] [place] with Chico Harper (NPI: [id number])     *Please review medication hold(s), PATs, and check H&P with patient.*  PATIENT WAS MAILED SURGERY/SHOWERING/DISCHARGE/COVID INSTRUCTIONS AFTER REVIEWING WITH THEM VIA PHONE CALL.

## 2024-12-16 NOTE — TELEPHONE ENCOUNTER
Per patient request left a detailed message on 12/16/2024 about patient pre op instructions. I did leave a number for patient son to call us back with any questions

## 2024-12-16 NOTE — TELEPHONE ENCOUNTER
Operative Scheduling Information:     Hospital:  [facility] or [facility]     Physician:  Meredith     Surgery: Right leg angiogram, LimFlow procedure     Urgency:  Standard     Level:  Level 3: Outpatients to be scheduled for elective surgery than can be delayed up to 4 weeks without reasonable expectation of detriment to patient     ASAP   Add on [facility] 12/17 if possible     Case Length:  Normal     Post-op Bed:  Outpatient     OR Table:  IR     Equipment Needs:  Rep: LimFlow (Shaq Wynn)     Medication Instructions:  Aspirin:   Continue (do not hold)  Plavix:  Continue (do not hold)     Hydration:  No     Contrast Allergy:  no

## 2024-12-18 DIAGNOSIS — M54.50 CHRONIC MIDLINE LOW BACK PAIN: ICD-10-CM

## 2024-12-18 DIAGNOSIS — G89.29 CHRONIC MIDLINE LOW BACK PAIN: ICD-10-CM

## 2024-12-19 RX ORDER — METHOCARBAMOL 500 MG/1
500 TABLET, FILM COATED ORAL 3 TIMES DAILY
Qty: 90 TABLET | Refills: 0 | Status: ON HOLD | OUTPATIENT
Start: 2024-12-19

## 2024-12-19 NOTE — TELEPHONE ENCOUNTER
Spoke to son in regards of his father having a possible cardiology appointment to get his clearance for his upcoming procedure. His son said he will let his father know and that Bryanna from cardiology will be reaching out to see if needs an appointment.

## 2024-12-20 NOTE — CONSULTS
Consult Note- Acute Pain Service   Darrel Rene 76 y o  male MRN: [id number]  Unit/Bed#: [facility] 511-01 Encounter: [id number]               Assessment/Plan     Assessment:   Patient Active Problem List   Diagnosis    Chronic combined systolic and diastolic CHF (congestive heart failure) (HCC)    S/P AVR    Anemia    Chronic midline low back pain    Ischemic cardiomyopathy    Ischemic leg pain    Prostate cancer ([address] )    CAD (coronary artery disease)    Fever    Hypotension    Lower extremity pain    Urinary retention      Vero Cardoso is a 76 y o  male  who presents on 7/12/21 for right leg pain had a lower extremity duplex performed which revealed 75 % stenosis of the CFA/SFA  The patient was assessed by vascular surgery and is s/p right femoral endarterectomy on 7/9/21  Upon assessment this morning he was sitting up in bed, overall expressed feeling well other than pain in his right foot while standing  The patient describes the pain as pins and needles  He was recently started on gabapentin 7/11/21  He states he stays away from the opioids because of his kidneys  Encouraged patient to trial low dose opioids prior to working with therapy  24 hour opioid intake:   0 mg    Plan:   - Increase dose of scheduled tylenol to 975 mg q 8hrs  - Continue gabapentin 200 mg TID, may consider increase in 48 hrs if no improvement in pins and needles   - Continue Robaxin 500 mg q 6hrs, if patient continues to do well in the next 24 hrs would titrate back to q 8 hrs   - Decrease PRN oxycodone 10 mg for severe pain to 7 5 mg q4 hrs PRN & decrease 5 mg PRN oxycodone to 2 5 mg PRN q4 hrs  - D/c IV dilaudid, patient is not using IV pain medications   - Encourage pain medication 1 hour prior to working with PT  - Elevate extremity PRN   - Continue lidocaine patches   - Continue colace  - Consider adding senna & PRN miralax to facilitate BM     APS will continue to follow   Please contact Acute Pain Service - [facility] via [employer] Subjective:  21 from [phone] with additional questions or concerns  See Gabrielle Wiggins for additional contacts and after hours information  History of Present Illness    Admit Date:  7/7/2021  Hospital Day:  5 days  Primary Service:  Hospitalist  Attending Provider:  Deyanira Ngo DO  Reason for Consult / Principal Problem: s/p endarterectomy pain management   HPI: Justo Lr is a 76 y o  male  who presents on 7/12/21 for right leg pain had a lower extremity duplex performed which revealed 75 % stenosis of the CFA/SFA  The patient was assessed by vascular surgery and is s/p right femoral endarterectomy on 7/9/21  Upon assessment this morning he was sitting up in bed, overall expressed feeling well other than pain in his right foot while standing  The patient describes the pain as pins and needles  He was recently started on gabapentin 7/11/21  He states he stays away from the opioids because of his kidneys  Encouraged patient to trial low dose opioids prior to working with therapy  Current pain location(s): right foot   Pain Scale:   3/10  Quality: Pins and needles, worse while weight bearing   Current Analgesic regimen:  Tylenol/Oxy/gabapentin     Pain History: Tramadol PRN   Pain Management Provider:  N/a     I have reviewed the patient's controlled substance dispensing history in the Prescription Drug Monitoring Program in compliance with the [facility] regulations before prescribing any controlled substances  Inpatient consult to Acute Pain Service  Consult performed by: OMAIRA Bro  Consult ordered by: Mariana Murphy MD          Review of Systems   Constitutional: Negative  HENT: Negative  Eyes: Negative  Respiratory: Negative  Cardiovascular: Negative  Gastrointestinal: Negative  Endocrine: Negative  Genitourinary: Negative  Musculoskeletal: Positive for gait problem  Skin: Negative  Allergic/Immunologic: Negative  Neurological: Positive for numbness  Hematological: Negative  Psychiatric/Behavioral: Negative  Historical Information   Past Medical History:   Diagnosis Date    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    Coronary artery disease 2001    S/p stenting to circumflex and RCA    HFrEF (heart failure with reduced ejection fraction) ([address] ) 2021    High cholesterol     Prostate cancer [facility])      Past Surgical History:   Procedure Laterality Date    CARDIAC SURGERY      AL THROMBOENDARTECTMY FEMORAL COMMON Right 2021    Procedure: ENDARTERECTOMY ARTERIAL FEMORAL;  Surgeon: Kallie Garcia DO;  Location: [facility];  Service: Vascular     Social History   Social History     Substance and Sexual Activity   Alcohol Use Not Currently     Social History     Substance and Sexual Activity   Drug Use Not on file     Social History     Tobacco Use   Smoking Status Former Smoker    Years: 20 00    Quit date: 2002    Years since quittin 0   Smokeless Tobacco Never Used     Family History: non-contributory    Meds/Allergies   all current active meds have been reviewed    No Known Allergies    Objective   Temp:  [98 5 °F (36 9 °C)-99 8 °F (37 7 °C)] 99 8 °F (37 7 °C)  HR:  [86-93] 93  Resp:  [18] 18  BP: ()/(46-65) 120/65    Intake/Output Summary (Last 24 hours) at 2021 1452  Last data filed at 2021 1351  Gross per 24 hour   Intake 1360 ml   Output 1750 ml   Net -390 ml       Physical Exam  Constitutional:       Appearance: Normal appearance  He is normal weight  HENT:      Head: Normocephalic and atraumatic  Nose: Nose normal       Mouth/Throat:      Mouth: Mucous membranes are moist       Pharynx: Oropharynx is clear  Eyes:      Extraocular Movements: Extraocular movements intact  Conjunctiva/sclera: Conjunctivae normal       Pupils: Pupils are equal, round, and reactive to light  Cardiovascular:      Rate and Rhythm: Normal rate and regular rhythm        Pulses:           Radial pulses are 2+ on the right side and 2+ on the left side  Dorsalis pedis pulses are 1+ on the right side and 1+ on the left side  Heart sounds: No murmur heard  Pulmonary:      Effort: Pulmonary effort is normal  No respiratory distress  Breath sounds: Normal breath sounds  Abdominal:      General: Abdomen is flat  Bowel sounds are normal  There is no distension  Palpations: Abdomen is soft  Tenderness: There is no abdominal tenderness  Musculoskeletal:         General: Normal range of motion  Cervical back: Normal range of motion  No rigidity  Lymphadenopathy:      Cervical: No cervical adenopathy  Skin:     General: Skin is warm and dry  Capillary Refill: Capillary refill takes 2 to 3 seconds  Neurological:      General: No focal deficit present  Mental Status: He is alert and oriented to person, place, and time  Psychiatric:         Mood and Affect: Mood normal          Behavior: Behavior normal          Lab Results: I have personally reviewed pertinent labs  Imaging Studies: I have personally reviewed pertinent reports  EKG, Pathology, and Other Studies: I have personally reviewed pertinent reports  Counseling / Coordination of Care  Total floor / unit time spent today 30 minutes  Greater than 50% of total time was spent with the patient and / or family counseling and / or coordination of care  A description of the counseling / coordination of care: post-procedure pain management     Please note that the APS provides consultative services regarding pain management only  With the exception of ketamine and epidural infusions and except when indicated, final decisions regarding starting or changing doses of analgesic medications are at the discretion of the consulting service  Off hours consultation and/or medication management is generally not available      OMAIRA Hernandes  Acute Pain Service

## 2024-12-23 ENCOUNTER — PATIENT OUTREACH (OUTPATIENT)
Dept: CASE MANAGEMENT | Facility: OTHER | Age: 79
End: 2024-12-23

## 2024-12-23 NOTE — PROGRESS NOTES
OSW placed outreach TC to pt this morning. OSW received his VM. Pts mailbox is full, therefore this writer was unable to leave a message.

## 2024-12-26 ENCOUNTER — APPOINTMENT (EMERGENCY)
Dept: RADIOLOGY | Facility: HOSPITAL | Age: 79
DRG: 394 | End: 2024-12-26
Payer: COMMERCIAL

## 2024-12-26 ENCOUNTER — HOSPITAL ENCOUNTER (INPATIENT)
Facility: HOSPITAL | Age: 79
LOS: 6 days | DRG: 394 | End: 2025-01-01
Attending: STUDENT IN AN ORGANIZED HEALTH CARE EDUCATION/TRAINING PROGRAM | Admitting: INTERNAL MEDICINE
Payer: COMMERCIAL

## 2024-12-26 DIAGNOSIS — R79.89 ELEVATED TROPONIN: Primary | ICD-10-CM

## 2024-12-26 DIAGNOSIS — L97.514 DIABETIC ULCER OF TOE OF RIGHT FOOT ASSOCIATED WITH TYPE 2 DIABETES MELLITUS, WITH NECROSIS OF BONE (HCC): ICD-10-CM

## 2024-12-26 DIAGNOSIS — L97.521 ULCER OF LEFT FOOT, LIMITED TO BREAKDOWN OF SKIN (HCC): ICD-10-CM

## 2024-12-26 DIAGNOSIS — E83.51 HYPOCALCEMIA: ICD-10-CM

## 2024-12-26 DIAGNOSIS — E11.621 DIABETIC ULCER OF TOE OF RIGHT FOOT ASSOCIATED WITH TYPE 2 DIABETES MELLITUS, WITH NECROSIS OF BONE (HCC): ICD-10-CM

## 2024-12-26 DIAGNOSIS — R33.9 URINARY RETENTION: ICD-10-CM

## 2024-12-26 DIAGNOSIS — E79.0 HYPERURICEMIA: ICD-10-CM

## 2024-12-26 PROBLEM — R19.7 DIARRHEA: Status: ACTIVE | Noted: 2024-12-26

## 2024-12-26 PROBLEM — M25.571 RIGHT ANKLE PAIN: Status: ACTIVE | Noted: 2024-12-26

## 2024-12-26 PROBLEM — E66.811 CLASS 1 OBESITY DUE TO EXCESS CALORIES WITH BODY MASS INDEX (BMI) OF 33.0 TO 33.9 IN ADULT: Status: ACTIVE | Noted: 2023-11-01

## 2024-12-26 PROBLEM — E87.1 HYPONATREMIA: Status: ACTIVE | Noted: 2024-12-26

## 2024-12-26 PROBLEM — E66.09 CLASS 1 OBESITY DUE TO EXCESS CALORIES WITH BODY MASS INDEX (BMI) OF 33.0 TO 33.9 IN ADULT: Status: ACTIVE | Noted: 2023-11-01

## 2024-12-26 PROBLEM — R53.1 GENERALIZED WEAKNESS: Status: ACTIVE | Noted: 2024-12-26

## 2024-12-26 LAB
2HR DELTA HS TROPONIN: -11 NG/L
4HR DELTA HS TROPONIN: -13 NG/L
ALBUMIN SERPL BCG-MCNC: 3.6 G/DL (ref 3.5–5)
ALP SERPL-CCNC: 86 U/L (ref 34–104)
ALT SERPL W P-5'-P-CCNC: 13 U/L (ref 7–52)
ANION GAP SERPL CALCULATED.3IONS-SCNC: 12 MMOL/L (ref 4–13)
AST SERPL W P-5'-P-CCNC: 14 U/L (ref 13–39)
BASE EX.OXY STD BLDV CALC-SCNC: 87.1 % (ref 60–80)
BASE EXCESS BLDV CALC-SCNC: -5.1 MMOL/L
BASOPHILS # BLD AUTO: 0.06 THOUSANDS/ÂΜL (ref 0–0.1)
BASOPHILS NFR BLD AUTO: 0 % (ref 0–1)
BILIRUB SERPL-MCNC: 0.65 MG/DL (ref 0.2–1)
BUN SERPL-MCNC: 15 MG/DL (ref 5–25)
CALCIUM SERPL-MCNC: 7.3 MG/DL (ref 8.4–10.2)
CARDIAC TROPONIN I PNL SERPL HS: 105 NG/L (ref ?–50)
CARDIAC TROPONIN I PNL SERPL HS: 107 NG/L (ref ?–50)
CARDIAC TROPONIN I PNL SERPL HS: 118 NG/L (ref ?–50)
CHLORIDE SERPL-SCNC: 101 MMOL/L (ref 96–108)
CO2 SERPL-SCNC: 19 MMOL/L (ref 21–32)
CREAT SERPL-MCNC: 0.84 MG/DL (ref 0.6–1.3)
CRP SERPL QL: 179.1 MG/L
EOSINOPHIL # BLD AUTO: 0.17 THOUSAND/ÂΜL (ref 0–0.61)
EOSINOPHIL NFR BLD AUTO: 1 % (ref 0–6)
ERYTHROCYTE [DISTWIDTH] IN BLOOD BY AUTOMATED COUNT: 14.7 % (ref 11.6–15.1)
FLUAV AG UPPER RESP QL IA.RAPID: NEGATIVE
FLUBV AG UPPER RESP QL IA.RAPID: NEGATIVE
GFR SERPL CREATININE-BSD FRML MDRD: 83 ML/MIN/1.73SQ M
GLUCOSE SERPL-MCNC: 111 MG/DL (ref 65–140)
HCO3 BLDV-SCNC: 18.3 MMOL/L (ref 24–30)
HCT VFR BLD AUTO: 30.3 % (ref 36.5–49.3)
HGB BLD-MCNC: 10.2 G/DL (ref 12–17)
IMM GRANULOCYTES # BLD AUTO: 0.16 THOUSAND/UL (ref 0–0.2)
IMM GRANULOCYTES NFR BLD AUTO: 1 % (ref 0–2)
LACTATE SERPL-SCNC: 1.8 MMOL/L (ref 0.5–2)
LIPASE SERPL-CCNC: 20 U/L (ref 11–82)
LYMPHOCYTES # BLD AUTO: 0.67 THOUSANDS/ÂΜL (ref 0.6–4.47)
LYMPHOCYTES NFR BLD AUTO: 5 % (ref 14–44)
MAGNESIUM SERPL-MCNC: 1.8 MG/DL (ref 1.9–2.7)
MCH RBC QN AUTO: 30.5 PG (ref 26.8–34.3)
MCHC RBC AUTO-ENTMCNC: 33.7 G/DL (ref 31.4–37.4)
MCV RBC AUTO: 91 FL (ref 82–98)
MONOCYTES # BLD AUTO: 1.02 THOUSAND/ÂΜL (ref 0.17–1.22)
MONOCYTES NFR BLD AUTO: 7 % (ref 4–12)
NEUTROPHILS # BLD AUTO: 12.03 THOUSANDS/ÂΜL (ref 1.85–7.62)
NEUTS SEG NFR BLD AUTO: 86 % (ref 43–75)
NRBC BLD AUTO-RTO: 0 /100 WBCS
O2 CT BLDV-SCNC: 13.7 ML/DL
PCO2 BLDV: 28.9 MM HG (ref 42–50)
PH BLDV: 7.42 [PH] (ref 7.3–7.4)
PHOSPHATE SERPL-MCNC: 1.4 MG/DL (ref 2.3–4.1)
PLATELET # BLD AUTO: 274 THOUSANDS/UL (ref 149–390)
PMV BLD AUTO: 9.7 FL (ref 8.9–12.7)
PO2 BLDV: 48.3 MM HG (ref 35–45)
POTASSIUM SERPL-SCNC: 3.3 MMOL/L (ref 3.5–5.3)
PROT SERPL-MCNC: 6.3 G/DL (ref 6.4–8.4)
RBC # BLD AUTO: 3.34 MILLION/UL (ref 3.88–5.62)
SARS-COV+SARS-COV-2 AG RESP QL IA.RAPID: NEGATIVE
SODIUM SERPL-SCNC: 132 MMOL/L (ref 135–147)
URATE SERPL-MCNC: 11.5 MG/DL (ref 3.5–8.5)
WBC # BLD AUTO: 14.11 THOUSAND/UL (ref 4.31–10.16)

## 2024-12-26 PROCEDURE — 80053 COMPREHEN METABOLIC PANEL: CPT | Performed by: STUDENT IN AN ORGANIZED HEALTH CARE EDUCATION/TRAINING PROGRAM

## 2024-12-26 PROCEDURE — 84484 ASSAY OF TROPONIN QUANT: CPT | Performed by: STUDENT IN AN ORGANIZED HEALTH CARE EDUCATION/TRAINING PROGRAM

## 2024-12-26 PROCEDURE — 83735 ASSAY OF MAGNESIUM: CPT | Performed by: STUDENT IN AN ORGANIZED HEALTH CARE EDUCATION/TRAINING PROGRAM

## 2024-12-26 PROCEDURE — 73630 X-RAY EXAM OF FOOT: CPT

## 2024-12-26 PROCEDURE — 87804 INFLUENZA ASSAY W/OPTIC: CPT | Performed by: STUDENT IN AN ORGANIZED HEALTH CARE EDUCATION/TRAINING PROGRAM

## 2024-12-26 PROCEDURE — 83605 ASSAY OF LACTIC ACID: CPT | Performed by: STUDENT IN AN ORGANIZED HEALTH CARE EDUCATION/TRAINING PROGRAM

## 2024-12-26 PROCEDURE — 36415 COLL VENOUS BLD VENIPUNCTURE: CPT | Performed by: STUDENT IN AN ORGANIZED HEALTH CARE EDUCATION/TRAINING PROGRAM

## 2024-12-26 PROCEDURE — 86140 C-REACTIVE PROTEIN: CPT | Performed by: INTERNAL MEDICINE

## 2024-12-26 PROCEDURE — 93005 ELECTROCARDIOGRAM TRACING: CPT

## 2024-12-26 PROCEDURE — 82805 BLOOD GASES W/O2 SATURATION: CPT | Performed by: STUDENT IN AN ORGANIZED HEALTH CARE EDUCATION/TRAINING PROGRAM

## 2024-12-26 PROCEDURE — 99285 EMERGENCY DEPT VISIT HI MDM: CPT

## 2024-12-26 PROCEDURE — 96367 TX/PROPH/DG ADDL SEQ IV INF: CPT

## 2024-12-26 PROCEDURE — 99223 1ST HOSP IP/OBS HIGH 75: CPT | Performed by: INTERNAL MEDICINE

## 2024-12-26 PROCEDURE — 83690 ASSAY OF LIPASE: CPT | Performed by: STUDENT IN AN ORGANIZED HEALTH CARE EDUCATION/TRAINING PROGRAM

## 2024-12-26 PROCEDURE — 96365 THER/PROPH/DIAG IV INF INIT: CPT

## 2024-12-26 PROCEDURE — 84100 ASSAY OF PHOSPHORUS: CPT | Performed by: STUDENT IN AN ORGANIZED HEALTH CARE EDUCATION/TRAINING PROGRAM

## 2024-12-26 PROCEDURE — 85025 COMPLETE CBC W/AUTO DIFF WBC: CPT | Performed by: STUDENT IN AN ORGANIZED HEALTH CARE EDUCATION/TRAINING PROGRAM

## 2024-12-26 PROCEDURE — 87811 SARS-COV-2 COVID19 W/OPTIC: CPT | Performed by: STUDENT IN AN ORGANIZED HEALTH CARE EDUCATION/TRAINING PROGRAM

## 2024-12-26 PROCEDURE — 99285 EMERGENCY DEPT VISIT HI MDM: CPT | Performed by: STUDENT IN AN ORGANIZED HEALTH CARE EDUCATION/TRAINING PROGRAM

## 2024-12-26 PROCEDURE — 84550 ASSAY OF BLOOD/URIC ACID: CPT | Performed by: INTERNAL MEDICINE

## 2024-12-26 PROCEDURE — 96375 TX/PRO/DX INJ NEW DRUG ADDON: CPT

## 2024-12-26 RX ORDER — ONDANSETRON 2 MG/ML
4 INJECTION INTRAMUSCULAR; INTRAVENOUS ONCE
Status: COMPLETED | OUTPATIENT
Start: 2024-12-26 | End: 2024-12-26

## 2024-12-26 RX ORDER — LOPERAMIDE HYDROCHLORIDE 2 MG/1
2 CAPSULE ORAL 4 TIMES DAILY PRN
Status: DISCONTINUED | OUTPATIENT
Start: 2024-12-26 | End: 2025-01-01 | Stop reason: HOSPADM

## 2024-12-26 RX ORDER — PENTOXIFYLLINE 400 MG/1
400 TABLET, EXTENDED RELEASE ORAL
Status: DISCONTINUED | OUTPATIENT
Start: 2024-12-26 | End: 2025-01-01 | Stop reason: HOSPADM

## 2024-12-26 RX ORDER — FUROSEMIDE 40 MG/1
40 TABLET ORAL DAILY
Status: DISCONTINUED | OUTPATIENT
Start: 2024-12-27 | End: 2024-12-26

## 2024-12-26 RX ORDER — FERROUS SULFATE 325(65) MG
325 TABLET ORAL DAILY
Status: DISCONTINUED | OUTPATIENT
Start: 2024-12-27 | End: 2025-01-01 | Stop reason: HOSPADM

## 2024-12-26 RX ORDER — ATORVASTATIN CALCIUM 40 MG/1
80 TABLET, FILM COATED ORAL
Status: DISCONTINUED | OUTPATIENT
Start: 2024-12-26 | End: 2025-01-01 | Stop reason: HOSPADM

## 2024-12-26 RX ORDER — LOSARTAN POTASSIUM 25 MG/1
12.5 TABLET ORAL DAILY
Status: DISCONTINUED | OUTPATIENT
Start: 2024-12-27 | End: 2025-01-01 | Stop reason: HOSPADM

## 2024-12-26 RX ORDER — ACETAMINOPHEN 325 MG/1
650 TABLET ORAL EVERY 6 HOURS PRN
Status: DISCONTINUED | OUTPATIENT
Start: 2024-12-26 | End: 2025-01-01 | Stop reason: HOSPADM

## 2024-12-26 RX ORDER — ENOXAPARIN SODIUM 100 MG/ML
40 INJECTION SUBCUTANEOUS DAILY
Status: DISCONTINUED | OUTPATIENT
Start: 2024-12-27 | End: 2025-01-01 | Stop reason: HOSPADM

## 2024-12-26 RX ORDER — EZETIMIBE 10 MG/1
10 TABLET ORAL DAILY
Status: DISCONTINUED | OUTPATIENT
Start: 2024-12-27 | End: 2025-01-01 | Stop reason: HOSPADM

## 2024-12-26 RX ORDER — SODIUM CHLORIDE 9 MG/ML
75 INJECTION, SOLUTION INTRAVENOUS CONTINUOUS
Status: DISPENSED | OUTPATIENT
Start: 2024-12-26 | End: 2024-12-27

## 2024-12-26 RX ORDER — ALBUMIN (HUMAN) 12.5 G/50ML
12.5 SOLUTION INTRAVENOUS ONCE
Status: COMPLETED | OUTPATIENT
Start: 2024-12-26 | End: 2024-12-26

## 2024-12-26 RX ORDER — CLOPIDOGREL BISULFATE 75 MG/1
75 TABLET ORAL DAILY
Status: DISCONTINUED | OUTPATIENT
Start: 2024-12-27 | End: 2025-01-01 | Stop reason: HOSPADM

## 2024-12-26 RX ORDER — MUSCLE RUB CREAM 100; 150 MG/G; MG/G
CREAM TOPICAL 4 TIMES DAILY PRN
Status: DISCONTINUED | OUTPATIENT
Start: 2024-12-26 | End: 2025-01-01 | Stop reason: HOSPADM

## 2024-12-26 RX ORDER — ONDANSETRON 2 MG/ML
4 INJECTION INTRAMUSCULAR; INTRAVENOUS EVERY 6 HOURS PRN
Status: DISCONTINUED | OUTPATIENT
Start: 2024-12-26 | End: 2025-01-01 | Stop reason: HOSPADM

## 2024-12-26 RX ORDER — METHOCARBAMOL 500 MG/1
500 TABLET, FILM COATED ORAL 3 TIMES DAILY
Status: DISCONTINUED | OUTPATIENT
Start: 2024-12-26 | End: 2025-01-01 | Stop reason: HOSPADM

## 2024-12-26 RX ORDER — OXYCODONE HYDROCHLORIDE 5 MG/1
5 TABLET ORAL 2 TIMES DAILY PRN
Status: DISCONTINUED | OUTPATIENT
Start: 2024-12-26 | End: 2025-01-01 | Stop reason: HOSPADM

## 2024-12-26 RX ORDER — FENTANYL CITRATE 50 UG/ML
50 INJECTION, SOLUTION INTRAMUSCULAR; INTRAVENOUS ONCE
Refills: 0 | Status: COMPLETED | OUTPATIENT
Start: 2024-12-26 | End: 2024-12-26

## 2024-12-26 RX ORDER — PREDNISONE 5 MG/1
5 TABLET ORAL 2 TIMES DAILY WITH MEALS
Status: DISCONTINUED | OUTPATIENT
Start: 2024-12-26 | End: 2025-01-01 | Stop reason: HOSPADM

## 2024-12-26 RX ORDER — METOPROLOL SUCCINATE 25 MG/1
25 TABLET, EXTENDED RELEASE ORAL DAILY
Status: DISCONTINUED | OUTPATIENT
Start: 2024-12-27 | End: 2025-01-01 | Stop reason: HOSPADM

## 2024-12-26 RX ORDER — CALCIUM GLUCONATE 20 MG/ML
1 INJECTION, SOLUTION INTRAVENOUS ONCE
Status: COMPLETED | OUTPATIENT
Start: 2024-12-26 | End: 2024-12-26

## 2024-12-26 RX ORDER — PANTOPRAZOLE SODIUM 40 MG/10ML
40 INJECTION, POWDER, LYOPHILIZED, FOR SOLUTION INTRAVENOUS ONCE
Status: COMPLETED | OUTPATIENT
Start: 2024-12-26 | End: 2024-12-26

## 2024-12-26 RX ORDER — FAMOTIDINE 20 MG/1
20 TABLET, FILM COATED ORAL DAILY
Status: DISCONTINUED | OUTPATIENT
Start: 2024-12-27 | End: 2025-01-01 | Stop reason: HOSPADM

## 2024-12-26 RX ORDER — LEVOFLOXACIN 500 MG/1
500 TABLET, FILM COATED ORAL EVERY 24 HOURS
Status: COMPLETED | OUTPATIENT
Start: 2024-12-26 | End: 2024-12-26

## 2024-12-26 RX ORDER — POTASSIUM CHLORIDE 1500 MG/1
20 TABLET, EXTENDED RELEASE ORAL DAILY
Status: DISCONTINUED | OUTPATIENT
Start: 2024-12-27 | End: 2025-01-01 | Stop reason: HOSPADM

## 2024-12-26 RX ORDER — GABAPENTIN 300 MG/1
600 CAPSULE ORAL 2 TIMES DAILY
Status: DISCONTINUED | OUTPATIENT
Start: 2024-12-26 | End: 2025-01-01 | Stop reason: HOSPADM

## 2024-12-26 RX ORDER — ASPIRIN 81 MG/1
324 TABLET, CHEWABLE ORAL ONCE
Status: DISCONTINUED | OUTPATIENT
Start: 2024-12-26 | End: 2024-12-26

## 2024-12-26 RX ORDER — ASPIRIN 81 MG/1
81 TABLET, CHEWABLE ORAL DAILY
Status: DISCONTINUED | OUTPATIENT
Start: 2024-12-27 | End: 2025-01-01 | Stop reason: HOSPADM

## 2024-12-26 RX ORDER — SODIUM CHLORIDE, SODIUM GLUCONATE, SODIUM ACETATE, POTASSIUM CHLORIDE, MAGNESIUM CHLORIDE, SODIUM PHOSPHATE, DIBASIC, AND POTASSIUM PHOSPHATE .53; .5; .37; .037; .03; .012; .00082 G/100ML; G/100ML; G/100ML; G/100ML; G/100ML; G/100ML; G/100ML
500 INJECTION, SOLUTION INTRAVENOUS ONCE
Status: COMPLETED | OUTPATIENT
Start: 2024-12-26 | End: 2024-12-26

## 2024-12-26 RX ORDER — MAGNESIUM SULFATE 1 G/100ML
1 INJECTION INTRAVENOUS ONCE
Status: COMPLETED | OUTPATIENT
Start: 2024-12-26 | End: 2024-12-26

## 2024-12-26 RX ORDER — SODIUM CHLORIDE 9 MG/ML
20 INJECTION, SOLUTION INTRAVENOUS ONCE
OUTPATIENT
Start: 2025-01-02

## 2024-12-26 RX ADMIN — SODIUM CHLORIDE, SODIUM GLUCONATE, SODIUM ACETATE, POTASSIUM CHLORIDE, MAGNESIUM CHLORIDE, SODIUM PHOSPHATE, DIBASIC, AND POTASSIUM PHOSPHATE 500 ML: .53; .5; .37; .037; .03; .012; .00082 INJECTION, SOLUTION INTRAVENOUS at 12:04

## 2024-12-26 RX ADMIN — ALBUMIN (HUMAN) 12.5 G: 0.25 INJECTION, SOLUTION INTRAVENOUS at 19:30

## 2024-12-26 RX ADMIN — POTASSIUM PHOSPHATE, MONOBASIC AND POTASSIUM PHOSPHATE, DIBASIC 12 MMOL: 224; 236 INJECTION, SOLUTION, CONCENTRATE INTRAVENOUS at 18:56

## 2024-12-26 RX ADMIN — MAGNESIUM SULFATE HEPTAHYDRATE 1 G: 1 INJECTION, SOLUTION INTRAVENOUS at 14:30

## 2024-12-26 RX ADMIN — ONDANSETRON 4 MG: 2 INJECTION, SOLUTION INTRAMUSCULAR; INTRAVENOUS at 12:03

## 2024-12-26 RX ADMIN — SODIUM CHLORIDE 75 ML/HR: 0.9 INJECTION, SOLUTION INTRAVENOUS at 17:35

## 2024-12-26 RX ADMIN — METHOCARBAMOL TABLETS 500 MG: 500 TABLET, COATED ORAL at 17:31

## 2024-12-26 RX ADMIN — CALCIUM GLUCONATE 1 G: 20 INJECTION, SOLUTION INTRAVENOUS at 16:45

## 2024-12-26 RX ADMIN — GABAPENTIN 600 MG: 300 CAPSULE ORAL at 17:30

## 2024-12-26 RX ADMIN — FENTANYL CITRATE 50 MCG: 50 INJECTION INTRAMUSCULAR; INTRAVENOUS at 12:03

## 2024-12-26 RX ADMIN — METHOCARBAMOL TABLETS 500 MG: 500 TABLET, COATED ORAL at 21:14

## 2024-12-26 RX ADMIN — PANTOPRAZOLE SODIUM 40 MG: 40 INJECTION, POWDER, FOR SOLUTION INTRAVENOUS at 17:30

## 2024-12-26 RX ADMIN — PENTOXIFYLLINE 400 MG: 400 TABLET, EXTENDED RELEASE ORAL at 19:19

## 2024-12-26 RX ADMIN — DICLOFENAC SODIUM 2 G: 10 GEL TOPICAL at 21:14

## 2024-12-26 RX ADMIN — DICLOFENAC SODIUM 2 G: 10 GEL TOPICAL at 17:39

## 2024-12-26 RX ADMIN — LEVOFLOXACIN 500 MG: 500 TABLET, FILM COATED ORAL at 17:31

## 2024-12-26 RX ADMIN — PREDNISONE 5 MG: 10 TABLET ORAL at 17:31

## 2024-12-26 RX ADMIN — ATORVASTATIN CALCIUM 80 MG: 40 TABLET, FILM COATED ORAL at 17:30

## 2024-12-26 RX ADMIN — DIBASIC SODIUM PHOSPHATE, MONOBASIC POTASSIUM PHOSPHATE AND MONOBASIC SODIUM PHOSPHATE 1 TABLET: 852; 155; 130 TABLET ORAL at 18:14

## 2024-12-26 NOTE — ED PROVIDER NOTES
Time reflects when diagnosis was documented in both MDM as applicable and the Disposition within this note       Time User Action Codes Description Comment    12/26/2024  4:10 PM Tiffany Guardado Add [R79.89] Elevated troponin     12/27/2024 10:46 AM Enrrique Lauren Add [E11.621,  L97.514] Diabetic ulcer of toe of right foot associated with type 2 diabetes mellitus, with necrosis of bone (HCC)     12/27/2024 10:46 AM Enrrique Lauren Add [L97.521] Ulcer of left foot, limited to breakdown of skin (HCC)           ED Disposition       ED Disposition   Admit    Condition   Stable    Date/Time   Thu Dec 26, 2024  3:06 PM    Comment   Case was discussed with [facility] and the patient's admission status was agreed to be Admission Status: inpatient status to the service of Dr. Sloan .               Assessment & Plan       Medical Decision Making  79-year-old male with history of CHF status post AVR, CAD, peripheral arterial disease status post right femoral endarterectomy and SFA angioplasty/stenting with recent revision complicated by dry gangrene of the right great toe pending revascularization in early January, metastatic prostate cancer on chemo who presents with nausea, vomiting, diarrhea.  He states his most recent chemo was 3 weeks ago.  This week he has had the after mentioned GI symptoms without associated fever/chills, chest pain, shortness of breath, or any other new complaints.  He reports chronic pain to the right lower extremity with poikilothermia that is unchanged from before.  Today he felt too weak to get out of bed and so called 911.    Vitals with tachycardia but otherwise stable and afebrile.  Exam without abdominal tenderness to palpation,.  He is hypovolemic.  He has 2+ femoral pulses bilaterally with nonpalpable but dopplerable pulses in the bilateral distal lower extremities.  Both legs are cool but the right one is more cyanotic and has dry gangrene to the first toe.  Left foot has a ulcer to the lateral aspect of  foot without evidence of acute infection.    Differential diagnosis includes but is not limited to: Peripheral arterial disease, osteomyelitis, LALO, hypokalemia, electrolyte abnormality,    Workup and treatment as below:    Imaging: Plain films without evidence of osteomyelitis of the foot  EKG: N/A  Labs: See ED course  Meds: Electrolyte repletion, antiemetics, IV fluids  Consults: N/A  Reassessment: N/A    Dispo: Patient admitted to [facility]    Amount and/or Complexity of Data Reviewed  Labs: ordered. Decision-making details documented in ED Course.  Radiology: ordered.    Risk  Prescription drug management.  Decision regarding hospitalization.        ED Course as of 12/30/24 0846   Thu Dec 26, 2024   1219 Base Excess, Shen: -5.1   1220 MAGNESIUM(!): 1.8   1220 Phosphorus(!): 1.4   1220 Potassium(!): 3.3   1220 Calcium(!): 7.3   1222 LACTIC ACID: 1.8   1256 hs TnI 0hr(!): 118       Medications   aspirin chewable tablet 81 mg (has no administration in time range)   atorvastatin (LIPITOR) tablet 80 mg (80 mg Oral Given 12/26/24 1730)   bismuth subsalicylate (PEPTO BISMOL) oral suspension 524 mg (has no administration in time range)   clopidogrel (PLAVIX) tablet 75 mg (has no administration in time range)   Empagliflozin (JARDIANCE) tablet 10 mg (has no administration in time range)   ezetimibe (ZETIA) tablet 10 mg (has no administration in time range)   famotidine (PEPCID) tablet 20 mg (has no administration in time range)   ferrous sulfate tablet 325 mg (has no administration in time range)   gabapentin (NEURONTIN) capsule 600 mg (600 mg Oral Given 12/26/24 1730)   loperamide (IMODIUM) capsule 2 mg (has no administration in time range)   losartan (COZAAR) tablet 12.5 mg (has no administration in time range)   methocarbamol (ROBAXIN) tablet 500 mg (500 mg Oral Given 12/26/24 1731)   metoprolol succinate (TOPROL-XL) 24 hr tablet 25 mg (has no administration in time range)   oxyCODONE (ROXICODONE) IR tablet 5 mg (has no  administration in time range)   pentoxifylline (TRENtal) ER tablet 400 mg (400 mg Oral Given 12/26/24 1919)   potassium chloride (Klor-Con M20) CR tablet 20 mEq (has no administration in time range)   predniSONE tablet 5 mg (5 mg Oral Given 12/26/24 1731)   sodium chloride 0.9 % infusion (75 mL/hr Intravenous New Bag 12/26/24 1735)   acetaminophen (TYLENOL) tablet 650 mg (has no administration in time range)   ondansetron (ZOFRAN) injection 4 mg (has no administration in time range)   enoxaparin (LOVENOX) subcutaneous injection 40 mg (has no administration in time range)   Diclofenac Sodium (VOLTAREN) 1 % topical gel 2 g (2 g Topical Given 12/26/24 1739)   menthol-methyl salicylate (BENGAY) 10-15 % cream (has no administration in time range)   fentaNYL injection 50 mcg (50 mcg Intravenous Given 12/26/24 1203)   multi-electrolyte (ISOLYTE-S PH 7.4) bolus 500 mL (0 mL Intravenous Stopped 12/26/24 1304)   ondansetron (ZOFRAN) injection 4 mg (4 mg Intravenous Given 12/26/24 1203)   magnesium sulfate IVPB (premix) SOLN 1 g (0 g Intravenous Stopped 12/26/24 1530)   potassium-sodium phosphateS (K-PHOS,PHOSPHA 250) -250 mg tablet 1 tablet (1 tablet Oral Given 12/26/24 1814)   calcium gluconate 1 g in sodium chloride 0.9% 50 mL (premix) (0 g Intravenous Stopped 12/26/24 1715)   levofloxacin (LEVAQUIN) tablet 500 mg (500 mg Oral Given 12/26/24 1731)   potassium phosphates 12 mmol in sodium chloride 0.9 % 250 mL infusion (12 mmol Intravenous New Bag 12/26/24 1856)   pantoprazole (PROTONIX) injection 40 mg (40 mg Intravenous Given 12/26/24 1730)   albumin human (FLEXBUMIN) 25 % injection 12.5 g (0 g Intravenous Stopped 12/26/24 1941)       ED Risk Strat Scores                                              History of Present Illness       Chief Complaint   Patient presents with    Weakness - Generalized     Onet 5 days ago getting worse       Past Medical History:   Diagnosis Date    Cancer (HCC) 01/2002    jaun     Coronary artery disease     S/p stenting to circumflex and RCA    HFrEF (heart failure with reduced ejection fraction) ([facility]) 2021    High cholesterol     Pacemaker     Prostate cancer (HCC)       Past Surgical History:   Procedure Laterality Date    CARDIAC CATHETERIZATION      CARDIAC ELECTROPHYSIOLOGY PROCEDURE N/A 2021    Procedure: Implant ICD - bi ventricular;  Surgeon: Jonas Oviedo MD;  Location: [facility];  Service: Cardiology    COLONOSCOPY      IR LOWER EXTREMITY ANGIOGRAM  2023    IR LOWER EXTREMITY ANGIOGRAM  10/30/2024    AR TEAEC W/WO PATCH GRAFT COMMON FEMORAL Right 2021    Procedure: ENDARTERECTOMY ARTERIAL FEMORAL;  Surgeon: Serina Cook DO;  Location: [facility];  Service: Vascular      Family History   Problem Relation Age of Onset    Cancer Mother       Social History     Tobacco Use    Smoking status: Former     Current packs/day: 0.00     Types: Cigarettes     Start date: 1962     Quit date: 2002     Years since quittin.5     Passive exposure: Past    Smokeless tobacco: Never   Vaping Use    Vaping status: Never Used   Substance Use Topics    Alcohol use: Not Currently    Drug use: Not Currently      E-Cigarette/Vaping    E-Cigarette Use Never User       E-Cigarette/Vaping Substances    Nicotine No     THC No     CBD No     Flavoring No     Other No     Unknown No       I have reviewed and agree with the history as documented.     79-year-old male with history of CHF status post AVR, CAD, peripheral arterial disease status post right femoral endarterectomy and SFA angioplasty/stenting with recent revision complicated by dry gangrene of the right great toe pending revascularization in early January, metastatic prostate cancer on chemo who presents with nausea, vomiting, diarrhea.  He states his most recent chemo was 3 weeks ago.  This week he has had the after mentioned GI symptoms without associated fever/chills, chest pain, shortness  of breath, or any other new complaints.  He reports chronic pain to the right lower extremity with poikilothermia that is unchanged from before.  Today he felt too weak to get out of bed and so called 911.          Review of Systems   Constitutional:  Negative for chills and fever.   HENT:  Negative for ear pain and sore throat.    Eyes:  Negative for pain and visual disturbance.   Respiratory:  Negative for cough and shortness of breath.    Cardiovascular:  Negative for chest pain and palpitations.   Gastrointestinal:  Positive for nausea and vomiting. Negative for abdominal pain, blood in stool, constipation and diarrhea.   Genitourinary:  Negative for dysuria and hematuria.   Musculoskeletal:  Positive for arthralgias. Negative for back pain.   Skin:  Positive for wound. Negative for color change and rash.   Neurological:  Negative for dizziness, seizures, syncope, facial asymmetry, speech difficulty, weakness, light-headedness, numbness and headaches.   All other systems reviewed and are negative.          Objective       ED Triage Vitals   Temperature Pulse Blood Pressure Respirations SpO2 Patient Position - Orthostatic VS   12/26/24 1105 12/26/24 1105 12/26/24 1220 12/26/24 1105 12/26/24 1105 12/26/24 1220   98.4 °F (36.9 °C) (!) 111 92/53 16 96 % Lying      Temp Source Heart Rate Source BP Location FiO2 (%) Pain Score    12/26/24 1105 12/26/24 1105 12/26/24 1220 -- 12/26/24 1105    Oral Monitor Right arm  10 - Worst Possible Pain      Vitals      Date and Time Temp Pulse SpO2 Resp BP Pain Score FACES Pain Rating User   12/30/24 0822 -- 97 93 % -- 100/49 -- -- DII   12/30/24 0746 99 °F (37.2 °C) 100 94 % 18 96/49 -- -- DII   12/29/24 2205 -- -- -- -- -- No Pain -- AP   12/29/24 1601 98.8 °F (37.1 °C) 89 92 % 18 112/61 -- -- DII   12/29/24 0823 98.9 °F (37.2 °C) 92 95 % 20 118/60 -- -- DII   12/28/24 2351 98.8 °F (37.1 °C) 92 94 % 18 112/94 -- -- DII   12/28/24 2100 -- -- -- -- -- No Pain --    12/28/24 0646  -- -- -- -- -- 8 -- CO   12/28/24 1132 -- 104 92 % -- 127/99 -- -- DII   12/28/24 1130 -- 93 92 % -- 122/67 -- -- DII   12/28/24 0817 99.4 °F (37.4 °C) 100 93 % 16 104/66 -- -- DII   12/27/24 2225 98.5 °F (36.9 °C) -- -- -- 103/65 -- -- EL   12/27/24 2100 -- -- -- -- -- No Pain -- HS   12/27/24 1630 97.8 °F (36.6 °C) 88 94 % 20 160/92 -- -- DII   12/27/24 1334 -- 94 91 % -- 139/79 -- -- DII   12/27/24 1005 -- -- -- -- -- 10 - Worst Possible Pain -- FB   12/27/24 0924 -- -- -- -- -- 8 -- AS   12/27/24 0827 100.1 °F (37.8 °C) 95 92 % 19 121/71 -- -- DII   12/27/24 0822 -- -- -- -- -- 8 -- MS   12/26/24 2307 -- -- -- -- -- No Pain -- RT   12/26/24 2307 98.8 °F (37.1 °C) 96 94 % 17 92/53 -- -- DII   12/26/24 2106 -- -- -- -- 100/40 -- -- YB   12/26/24 2105 -- 92 92 % -- 88/48 -- -- YB   12/26/24 2100 -- -- 91 % 18 -- -- -- YB   12/26/24 2100 99 °F (37.2 °C) 98 -- -- 88/48 -- -- DII   12/26/24 2015 -- 93 94 % -- 86/50 -- -- KD   [birth year]/1945 -- 97 94 % 18 88/46 -- -- CH   12/26/24 1900 -- 101 95 % 18 95/52 No Pain -- CH   12/26/24 1800 -- 99 95 % -- 108/69 -- -- CF   12/26/24 1700 -- 94 94 % 16 101/52 No Pain -- CH   12/26/24 1630 -- 93 95 % -- 108/55 -- -- CF   12/26/24 1430 -- 94 98 % -- 100/40 -- -- LAB   12/26/24 1400 -- 91 95 % -- 113/56 -- -- LAB   12/26/24 1220 -- 96 94 % 16 92/53 -- -- LAB   12/26/24 1105 98.4 °F (36.9 °C) 111 96 % 16 -- 10 - Worst Possible Pain -- LAB            Physical Exam  Constitutional:       General: He is not in acute distress.     Appearance: He is not ill-appearing or toxic-appearing.   HENT:      Head: Normocephalic.      Mouth/Throat:      Mouth: Mucous membranes are dry.      Pharynx: Oropharynx is clear.   Cardiovascular:      Rate and Rhythm: Normal rate and regular rhythm.      Heart sounds: Normal heart sounds.   Pulmonary:      Effort: Pulmonary effort is normal.      Breath sounds: Normal breath sounds.   Abdominal:      Palpations: Abdomen is soft.      Tenderness: There is  no abdominal tenderness.   Musculoskeletal:      Right lower leg: No edema.      Left lower leg: No edema.      Comments: 2+ femoral pulses bilaterally with nonpalpable but dopplerable pulses in the bilateral distal lower extremities.  Both legs are cool but the right one is more cyanotic and has dry gangrene to the first toe.  Left foot has a ulcer to the lateral aspect of foot without evidence of acute infection.     Skin:     General: Skin is warm.      Capillary Refill: Capillary refill takes less than 2 seconds.   Neurological:      Mental Status: He is alert and oriented to person, place, and time.   Psychiatric:         Mood and Affect: Mood normal.         Results Reviewed       Procedure Component Value Units Date/Time    Fecal leukocytes [[id number]] Collected: 12/27/24 1326    Lab Status: Final result Specimen: Stool from Per Rectum Updated: 12/28/24 1605     White Blood Cells, Stool No white blood cells seen.    Narrative:      Performed at:  [address] [place] [address]  [id number]  : Nadiya Patel MD, Phone:  [phone]    Stool Enteric Bacterial Panel by PCR [[id number]]  (Normal) Collected: 12/27/24 1326    Lab Status: Final result Specimen: Stool from Per Rectum Updated: 12/28/24 1310     Salmonella sp PCR Negative     Shigella sp/Enteroinvasive E. coli (EIEC) PCR Negative     Campylobacter sp (jejuni and coli) PCR Negative     Shiga toxin 1/Shiga toxin 2 genes PCR Negative    Narrative:      This test has been performed using the FDA-approved BD MAX system for the qualitative detection of Salmonella spp., Campylobacter spp. (jejuni and coli), Shigella spp./Enteroinvasive E. coli (EIEC), and Shiga toxin 1 (stx1)/Shiga toxin 2 (stx2) from unpreserved liquid or soft stool or Yari-Grant preserved stool specimens from patients with suspected acute gastroenteritis, enteritis, or colitis using polymerase chain reaction (PCR) methodology.    Negative PCR results do not preclude  infection and should not be used as the sole basis for treatment or other patient management decisions.    Positive PCR results are indicative of infection, but do not necessarily indicate the presence of viable organisms and do not rule out co-infection with other bacteria or viruses.    As with all polymerase-chain reaction methodology, extremely low levels of target below the limit of detection may be detected, but results may not be reproducible.    All positive results are reflexed to culture for confirmation and/or for susceptibility testing.    All test results should be used as an adjunct to clinical observations and other information available to the provider.    Clostridium difficile toxin by PCR with EIA [[id number]]  (Normal) Collected: 12/27/24 1326    Lab Status: Final result Specimen: Stool from Per Rectum Updated: 12/28/24 1307     C.difficile toxin by PCR Negative    Narrative:      This test has been performed using either the FDA-approved BD MAX system or the FDA-approved [employer] system for the qualitative detection of Clostridioides difficile toxin B gene (tcdB) in human liquid or soft stool specimens from patients suspected of having Clostridioides difficile infection using polymerase chain reaction (PCR) methodology.    Negative PCR results do not preclude infection and should not be used as the sole basis for treatment or other patient management decisions.    Positive PCR results are indicative of colonization or infection, but do not rule out co-infection with other bacteria or viruses.    As with all polymerase-chain reaction methodology, extremely low levels of target below the limit of detection may be detected, but results may not be reproducible.    All positive results are reflexed to a toxin A & B enzyme immunoassay (EIA) to distinguish between active infection and colonization.  Positive EIA results are indicative of an active infection.  Negative EIA results are indicative of  colonization without active injection.    All test results should be used as an adjunct to clinical observations and other information available to the provider.    Comprehensive metabolic panel [[id number]]  (Abnormal) Collected: 12/27/24 0608    Lab Status: Final result Specimen: Blood from Hand, Right Updated: 12/27/24 0749     Sodium 137 mmol/L      Potassium 3.3 mmol/L      Chloride 107 mmol/L      CO2 20 mmol/L      ANION GAP 10 mmol/L      BUN 13 mg/dL      Creatinine 0.78 mg/dL      Glucose 93 mg/dL      Calcium 7.0 mg/dL      Corrected Calcium 7.5 mg/dL      AST 15 U/L      ALT 11 U/L      Alkaline Phosphatase 70 U/L      Total Protein 5.8 g/dL      Albumin 3.4 g/dL      Total Bilirubin 0.54 mg/dL      eGFR 85 ml/min/1.73sq m     Narrative:      National Kidney Disease Foundation guidelines for Chronic Kidney Disease (CKD):     Stage 1 with normal or high GFR (GFR > 90 mL/min/1.73 square meters)    Stage 2 Mild CKD (GFR = 60-89 mL/min/1.73 square meters)    Stage 3A Moderate CKD (GFR = 45-59 mL/min/1.73 square meters)    Stage 3B Moderate CKD (GFR = 30-44 mL/min/1.73 square meters)    Stage 4 Severe CKD (GFR = 15-29 mL/min/1.73 square meters)    Stage 5 End Stage CKD (GFR <15 mL/min/1.73 square meters)  Note: GFR calculation is accurate only with a steady state creatinine    Magnesium [[id number]]  (Normal) Collected: 12/27/24 0608    Lab Status: Final result Specimen: Blood from Hand, Right Updated: 12/27/24 0749     Magnesium 1.9 mg/dL     Phosphorus [[id number]]  (Abnormal) Collected: 12/27/24 0608    Lab Status: Final result Specimen: Blood from Hand, Right Updated: 12/27/24 0749     Phosphorus 1.9 mg/dL     CBC and differential [[id number]]  (Abnormal) Collected: 12/27/24 0608    Lab Status: Final result Specimen: Blood from Hand, Right Updated: 12/27/24 0703     WBC 11.90 Thousand/uL      RBC 3.13 Million/uL      Hemoglobin 9.3 g/dL      Hematocrit 29.2 %      MCV 93 fL      MCH 29.7 pg      MCHC 31.8  g/dL      RDW 14.9 %      MPV 10.2 fL      Platelets 281 Thousands/uL      nRBC 0 /100 WBCs      Segmented % 83 %      Immature Grans % 1 %      Lymphocytes % 7 %      Monocytes % 9 %      Eosinophils Relative 0 %      Basophils Relative 0 %      Absolute Neutrophils 9.80 Thousands/µL      Absolute Immature Grans 0.11 Thousand/uL      Absolute Lymphocytes 0.81 Thousands/µL      Absolute Monocytes 1.10 Thousand/µL      Eosinophils Absolute 0.03 Thousand/µL      Basophils Absolute 0.05 Thousands/µL     Uric acid [[id number]]  (Abnormal) Collected: 12/26/24 1620    Lab Status: Final result Specimen: Blood from Arm, Left Updated: 12/26/24 1810     Uric Acid 11.5 mg/dL     Narrative:      N-acetyl-p-benzoquinone imine (metabolite of Acetaminophen) will generate erroneously low results in samples for patients that have taken an overdose of Acetaminophen.    C-reactive protein [[id number]]  (Abnormal) Collected: 12/26/24 1620    Lab Status: Final result Specimen: Blood from Arm, Left Updated: 12/26/24 1810     .1 mg/L     HS Troponin I 4hr [[id number]]  (Abnormal) Collected: 12/26/24 1620    Lab Status: Final result Specimen: Blood from Arm, Left Updated: 12/26/24 1646     hs TnI 4hr 105 ng/L      Delta 4hr hsTnI -13 ng/L     HS Troponin I 2hr [[id number]]  (Abnormal) Collected: 12/26/24 1429    Lab Status: Final result Specimen: Blood from Arm, Left Updated: 12/26/24 1501     hs TnI 2hr 107 ng/L      Delta 2hr hsTnI -11 ng/L     FLU/COVID Rapid Antigen (30 min. TAT) - Preferred screening test in ED [[id number]]  (Normal) Collected: 12/26/24 1155    Lab Status: Final result Specimen: Nares from Nose Updated: 12/26/24 1245     SARS COV Rapid Antigen Negative     Influenza A Rapid Antigen Negative     Influenza B Rapid Antigen Negative    Narrative:      This test has been performed using the [employer] Sherley 2 FLU+SARS Antigen test under the Emergency Use Authorization (EUA). This test has been validated by the   and verified by the performing laboratory. The Sherley uses lateral flow immunofluorescent sandwich assay to detect SARS-COV, Influenza A and Influenza B Antigen.     The [employer] Sherley 2 SARS Antigen test does not differentiate between SARS-CoV and SARS-CoV-2.     Negative results are presumptive and may be confirmed with a molecular assay, if necessary, for patient management. Negative results do not rule out SARS-CoV-2 or influenza infection and should not be used as the sole basis for treatment or patient management decisions. A negative test result may occur if the level of antigen in a sample is below the limit of detection of this test.     Positive results are indicative of the presence of viral antigens, but do not rule out bacterial infection or co-infection with other viruses.     All test results should be used as an adjunct to clinical observations and other information available to the provider.    FOR PEDIATRIC PATIENTS - copy/paste COVID Guidelines URL to browser: [email or web address]    HS Troponin 0hr (reflex protocol) [[id number]]  (Abnormal) Collected: 12/26/24 1155    Lab Status: Final result Specimen: Blood from Arm, Left Updated: 12/26/24 1226     hs TnI 0hr 118 ng/L     Lactic acid, plasma (w/reflex if result > 2.0) [[id number]]  (Normal) Collected: 12/26/24 1155    Lab Status: Final result Specimen: Blood from Arm, Left Updated: 12/26/24 1222     LACTIC ACID 1.8 mmol/L     Narrative:      Result may be elevated if tourniquet was used during collection.    Comprehensive metabolic panel [[id number]]  (Abnormal) Collected: 12/26/24 1155    Lab Status: Final result Specimen: Blood from Arm, Left Updated: 12/26/24 1219     Sodium 132 mmol/L      Potassium 3.3 mmol/L      Chloride 101 mmol/L      CO2 19 mmol/L      ANION GAP 12 mmol/L      BUN 15 mg/dL      Creatinine 0.84 mg/dL      Glucose 111 mg/dL      Calcium 7.3 mg/dL      AST 14 U/L      ALT 13  U/L      Alkaline Phosphatase 86 U/L      Total Protein 6.3 g/dL      Albumin 3.6 g/dL      Total Bilirubin 0.65 mg/dL      eGFR 83 ml/min/1.73sq m     Narrative:      National Kidney Disease Foundation guidelines for Chronic Kidney Disease (CKD):     Stage 1 with normal or high GFR (GFR > 90 mL/min/1.73 square meters)    Stage 2 Mild CKD (GFR = 60-89 mL/min/1.73 square meters)    Stage 3A Moderate CKD (GFR = 45-59 mL/min/1.73 square meters)    Stage 3B Moderate CKD (GFR = 30-44 mL/min/1.73 square meters)    Stage 4 Severe CKD (GFR = 15-29 mL/min/1.73 square meters)    Stage 5 End Stage CKD (GFR <15 mL/min/1.73 square meters)  Note: GFR calculation is accurate only with a steady state creatinine    Lipase [[id number]]  (Normal) Collected: 12/26/24 1155    Lab Status: Final result Specimen: Blood from Arm, Left Updated: 12/26/24 1219     Lipase 20 u/L     Magnesium [[id number]]  (Abnormal) Collected: 12/26/24 1155    Lab Status: Final result Specimen: Blood from Arm, Left Updated: 12/26/24 1219     Magnesium 1.8 mg/dL     Phosphorus [[id number]]  (Abnormal) Collected: 12/26/24 1155    Lab Status: Final result Specimen: Blood from Arm, Left Updated: 12/26/24 1219     Phosphorus 1.4 mg/dL     Blood gas, venous [[id number]]  (Abnormal) Collected: 12/26/24 1155    Lab Status: Final result Specimen: Blood from Arm, Left Updated: 12/26/24 1211     pH, Shen 7.419     pCO2, Shen 28.9 mm Hg      pO2, Shen 48.3 mm Hg      HCO3, Shen 18.3 mmol/L      Base Excess, Shne -5.1 mmol/L      O2 Content, Shen 13.7 ml/dL      O2 HGB, VENOUS 87.1 %     CBC and differential [[id number]]  (Abnormal) Collected: 12/26/24 1155    Lab Status: Final result Specimen: Blood from Arm, Left Updated: 12/26/24 1207     WBC 14.11 Thousand/uL      RBC 3.34 Million/uL      Hemoglobin 10.2 g/dL      Hematocrit 30.3 %      MCV 91 fL      MCH 30.5 pg      MCHC 33.7 g/dL      RDW 14.7 %      MPV 9.7 fL      Platelets 274 Thousands/uL      nRBC 0 /100 WBCs       Segmented % 86 %      Immature Grans % 1 %      Lymphocytes % 5 %      Monocytes % 7 %      Eosinophils Relative 1 %      Basophils Relative 0 %      Absolute Neutrophils 12.03 Thousands/µL      Absolute Immature Grans 0.16 Thousand/uL      Absolute Lymphocytes 0.67 Thousands/µL      Absolute Monocytes 1.02 Thousand/µL      Eosinophils Absolute 0.17 Thousand/µL      Basophils Absolute 0.06 Thousands/µL             XR foot 3+ views RIGHT   Final Interpretation by Christophe Olivera MD (12/26 1512)      No x-ray evidence of osteomyelitis at this time.      Some sclerosis of the calcaneus is identified of indeterminate etiology. This would be an unusual location for metastatic disease. Subtle stress injury is possible.      The study was marked in EPIC for immediate notification.         Computerized Assisted Algorithm (CAA) may have been used to analyze all applicable images.         Workstation performed: [id number]             Procedures    ED Medication and Procedure Management   Prior to Admission Medications   Prescriptions Last Dose Informant Patient Reported? Taking?   Accu-Chek FastClix Lancets MISC  Self Yes No   Alcohol Swabs (Alcohol Pads) 70 % PADS  Self Yes No   Sig: USE 2-3 TIMES AS DIRECTED.   Blood Glucose Monitoring Suppl (Accu-Chek Guide Me) w/Device KIT  Self No No   Sig: USE AS DIRECTED   Empagliflozin (Jardiance) 10 MG TABS tablet  Self No No   Sig: TAKE 1 TABLET (10 MG TOTAL) BY MOUTH EVERY MORNING   Lancet Devices (Lancing Device) MISC  Self No No   Sig: TID   acetaminophen (TYLENOL) 500 mg tablet  Self No No   Sig: Take 2 tablets (1,000 mg total) by mouth 3 (three) times a day as needed for mild pain   aspirin 81 mg chewable tablet  Self No No   Sig: Chew 1 tablet (81 mg total) daily   atorvastatin (LIPITOR) 80 mg tablet  Self No No   Sig: Take 1 tablet (80 mg total) by mouth daily with dinner   bismuth subsalicylate (PEPTO BISMOL) 262 MG chewable tablet  Self Yes No   Sig: Chew 524 mg As  needed   clopidogrel (PLAVIX) 75 mg tablet  Self No No   Sig: Take 1 tablet (75 mg total) by mouth daily   cyanocobalamin (VITAMIN B-12) 1000 MCG tablet  Self No No   Sig: Take 1 tablet (1,000 mcg total) by mouth daily   ezetimibe (ZETIA) 10 mg tablet  Self No No   Sig: Take 1 tablet (10 mg total) by mouth daily   famotidine (PEPCID) 20 mg tablet  Self No No   Sig: Take 1 tablet (20 mg total) by mouth daily   ferrous sulfate 325 (65 Fe) mg tablet  Self No No   Sig: Take 1 tablet (325 mg total) by mouth daily   furosemide (LASIX) 20 mg tablet  Self No No   Sig: TAKE 2 TABLETS (40 MG TOTAL) BY MOUTH DAILY   gabapentin (NEURONTIN) 300 mg capsule  Self No No   Sig: Take 2 capsules (600 mg total) by mouth 2 (two) times a day   glucose blood (Accu-Chek Guide) test strip  Self No No   Sig: TEST 2-3 TIMES AS DIRECTED   levofloxacin (LEVAQUIN) 500 mg tablet  Self No No   Sig: Take 1 tablet (500 mg total) by mouth every 24 hours for 14 days   loperamide (IMODIUM) 2 mg capsule  Self No No   Sig: Take 1 capsule (2 mg total) by mouth 4 (four) times a day as needed for diarrhea   losartan (COZAAR) 25 mg tablet  Self No No   Sig: TAKE 0.5 TABLETS (12.5 MG TOTAL) BY MOUTH DAILY   methocarbamol (ROBAXIN) 500 mg tablet   No No   Sig: TAKE 1 TABLET (500 MG TOTAL) BY MOUTH 3 (THREE) TIMES A DAY   metoprolol succinate (TOPROL-XL) 25 mg 24 hr tablet  Self No No   Sig: Take 1 tablet (25 mg total) by mouth daily   ondansetron (ZOFRAN) 4 mg tablet  Self No No   Sig: Take 1 tablet (4 mg total) by mouth every 8 (eight) hours as needed for nausea or vomiting   oxyCODONE (Roxicodone) 5 immediate release tablet  Self No No   Sig: Take 1 tablet (5 mg total) by mouth 2 (two) times a day as needed (wound pain) Max Daily Amount: 10 mg   pentoxifylline (TRENtal) 400 mg ER tablet  Self No No   Sig: Take 1 tablet (400 mg total) by mouth 3 (three) times a day with meals   polyethylene glycol (GLYCOLAX) 17 GM/SCOOP powder  Self No No   Sig: Take 17 g by  mouth every other day Monday Wednesday Friday   potassium chloride (Klor-Con M20) 20 mEq tablet  Self No No   Sig: TAKE 1 TABLET (20 MEQ TOTAL) BY MOUTH DAILY   predniSONE 5 mg tablet  Self No No   Sig: TAKE 1 TABLET (5 MG TOTAL) BY MOUTH 2 (TWO) TIMES A DAY WITH MEALS      Facility-Administered Medications: None     Current Discharge Medication List        CONTINUE these medications which have NOT CHANGED    Details   Accu-Chek FastClix Lancets MISC       acetaminophen (TYLENOL) 500 mg tablet Take 2 tablets (1,000 mg total) by mouth 3 (three) times a day as needed for mild pain    Associated Diagnoses: Ischemic leg pain      Alcohol Swabs (Alcohol Pads) 70 % PADS USE 2-3 TIMES AS DIRECTED.      aspirin 81 mg chewable tablet Chew 1 tablet (81 mg total) daily  Qty: 90 tablet, Refills: 1    Comments: Bubble packs and delivery  Associated Diagnoses: Ischemic leg pain      atorvastatin (LIPITOR) 80 mg tablet Take 1 tablet (80 mg total) by mouth daily with dinner  Qty: 90 tablet, Refills: 1    Comments: Bubble packs and delivery  Associated Diagnoses: Cardiomyopathy, unspecified type (HCC)      bismuth subsalicylate (PEPTO BISMOL) 262 MG chewable tablet Chew 524 mg As needed      Blood Glucose Monitoring Suppl (Accu-Chek Guide Me) w/Device KIT USE AS DIRECTED  Qty: 1 kit, Refills: 1    Associated Diagnoses: Diabetes mellitus (HCC)      clopidogrel (PLAVIX) 75 mg tablet Take 1 tablet (75 mg total) by mouth daily  Qty: 90 tablet, Refills: 1    Comments: Bubble packs and delivery  Associated Diagnoses: Coronary artery disease involving native heart without angina pectoris, unspecified vessel or lesion type      cyanocobalamin (VITAMIN B-12) 1000 MCG tablet Take 1 tablet (1,000 mcg total) by mouth daily  Qty: 90 tablet, Refills: 1    Associated Diagnoses: Vitamin B12 deficiency      Empagliflozin (Jardiance) 10 MG TABS tablet TAKE 1 TABLET (10 MG TOTAL) BY MOUTH EVERY MORNING  Qty: 30 tablet, Refills: 5    Associated  Diagnoses: Chronic HFrEF (heart failure with reduced ejection fraction) ([facility])      ezetimibe (ZETIA) 10 mg tablet Take 1 tablet (10 mg total) by mouth daily  Qty: 90 tablet, Refills: 1    Associated Diagnoses: Mixed hyperlipidemia      famotidine (PEPCID) 20 mg tablet Take 1 tablet (20 mg total) by mouth daily  Qty: 30 tablet, Refills: 2    Comments: Bubble packs and delivery  Associated Diagnoses: Gastroesophageal reflux disease without esophagitis      ferrous sulfate 325 (65 Fe) mg tablet Take 1 tablet (325 mg total) by mouth daily  Qty: 90 tablet, Refills: 1    Comments: Bubble packs and delivery  Associated Diagnoses: Anemia, unspecified type      furosemide (LASIX) 20 mg tablet TAKE 2 TABLETS (40 MG TOTAL) BY MOUTH DAILY  Qty: 60 tablet, Refills: 5    Associated Diagnoses: Chronic combined systolic and diastolic CHF (congestive heart failure) ([facility])      gabapentin (NEURONTIN) 300 mg capsule Take 2 capsules (600 mg total) by mouth 2 (two) times a day  Qty: 120 capsule, Refills: 11    Comments: Blister pack please.  Associated Diagnoses: PAD (peripheral artery disease) ([facility])      glucose blood (Accu-Chek Guide) test strip TEST 2-3 TIMES AS DIRECTED  Qty: 100 each, Refills: 5    Associated Diagnoses: Palliative care patient; Diabetic foot infection  ([facility])      Lancet Devices (Lancing Device) MISC TID  Qty: 100 each, Refills: 2    Associated Diagnoses: Diabetes mellitus ([facility])      loperamide (IMODIUM) 2 mg capsule Take 1 capsule (2 mg total) by mouth 4 (four) times a day as needed for diarrhea  Qty: 30 capsule, Refills: 2    Comments: Functional diarrhea K59.1 Bubble packs and delivery  Associated Diagnoses: Functional diarrhea      losartan (COZAAR) 25 mg tablet TAKE 0.5 TABLETS (12.5 MG TOTAL) BY MOUTH DAILY  Qty: 45 tablet, Refills: 1    Associated Diagnoses: Chronic HFrEF (heart failure with reduced ejection fraction) ([facility])      methocarbamol (ROBAXIN) 500 mg tablet TAKE 1 TABLET (500 MG TOTAL) BY MOUTH 3  (THREE) TIMES A DAY  Qty: 90 tablet, Refills: 0    Associated Diagnoses: Chronic midline low back pain      metoprolol succinate (TOPROL-XL) 25 mg 24 hr tablet Take 1 tablet (25 mg total) by mouth daily  Qty: 90 tablet, Refills: 2    Associated Diagnoses: Chronic HFrEF (heart failure with reduced ejection fraction) ([facility])      ondansetron (ZOFRAN) 4 mg tablet Take 1 tablet (4 mg total) by mouth every 8 (eight) hours as needed for nausea or vomiting  Qty: 20 tablet, Refills: 1    Associated Diagnoses: Chemotherapy induced nausea and vomiting      oxyCODONE (Roxicodone) 5 immediate release tablet Take 1 tablet (5 mg total) by mouth 2 (two) times a day as needed (wound pain) Max Daily Amount: 10 mg  Qty: 60 tablet, Refills: 0    Comments: May fill immediately for severe pain related to chronic illness and ischemia, G89.4.  Please use home delivery if allowed.  Associated Diagnoses: Dry gangrene ([facility])      pentoxifylline (TRENtal) 400 mg ER tablet Take 1 tablet (400 mg total) by mouth 3 (three) times a day with meals  Qty: 90 tablet, Refills: 0    Associated Diagnoses: PAD (peripheral artery disease) ([facility])      polyethylene glycol (GLYCOLAX) 17 GM/SCOOP powder Take 17 g by mouth every other day Monday Wednesday Friday  Qty: 510 g, Refills: 0    Associated Diagnoses: Therapeutic opioid-induced constipation ([facility]IC)      potassium chloride (Klor-Con M20) 20 mEq tablet TAKE 1 TABLET (20 MEQ TOTAL) BY MOUTH DAILY  Qty: 30 tablet, Refills: 5    Associated Diagnoses: Hypokalemia      predniSONE 5 mg tablet TAKE 1 TABLET (5 MG TOTAL) BY MOUTH 2 (TWO) TIMES A DAY WITH MEALS  Qty: 60 tablet, Refills: 5    Associated Diagnoses: Prostate cancer ([facility])           STOP taking these medications       levofloxacin (LEVAQUIN) 500 mg tablet Comments:   Reason for Stopping:             No discharge procedures on file.  ED SEPSIS DOCUMENTATION   Time reflects when diagnosis was documented in both MDM as applicable and the Disposition within  this note       Time User Action Codes Description Comment    12/26/2024  4:10 PM Tiffany Guardado Add [R79.89] Elevated troponin     12/27/2024 10:46 AM Enrrique Lauren [E11.621,  L97.514] Diabetic ulcer of toe of right foot associated with type 2 diabetes mellitus, with necrosis of bone (HCC)     12/27/2024 10:46 AM Enrrique Lauren [L97.521] Ulcer of left foot, limited to breakdown of skin (HCC)                  Tiffany Guardado MD  12/30/24 0849

## 2024-12-26 NOTE — ASSESSMENT & PLAN NOTE
Dry gangrene right big toe with history of peripheral disease  Vascular surgery plans for outpatient follow-up noted

## 2024-12-26 NOTE — ASSESSMENT & PLAN NOTE
History of extensive peripheral arterial disease  Patient with gangrene of right toe  Continue aspirin Plavix statin  Pentoxifylline  Outpatient vascular surgery follow-up

## 2024-12-26 NOTE — ASSESSMENT & PLAN NOTE
History of prostate cancer follows with oncology receiving chemotherapy  Outpatient oncology inputs noted  Outpatient follow-up

## 2024-12-26 NOTE — H&P
H&P - Hospitalist   Name: Royce Rene 79 y.o. male I MRN: [id number]  Unit/Bed#: ED-03 I Date of Admission: 12/26/2024   Date of Service: 12/26/2024 I Hospital Day: 0     Assessment & Plan  Generalized weakness  Presents with generalized weakness, failure to thrive poor appetite episodes of loose stools.  Denies fever chills sweats constitutional symptoms  Likely multifactorial  Supportive cares  Physical therapy  Diarrhea  Patient with episodes of diarrhea since receiving chemotherapy last week.  He also reports some nausea and vomiting.  Poor p.o. intake  Check stool studies  Likely chemotherapy associated  Supportive cares  Replete electrolytes  Monitor  Right ankle pain  Patient reports right ankle pain  No obvious erythema swelling  X-ray reveals some sclerosis of calcaneus possible stress injury  Check uric acid CRP  Analgesics  Supportive cares    Chronic combined systolic and diastolic CHF (congestive heart failure) (HCC)  Wt Readings from Last 3 Encounters:   12/26/24 98.2 kg (216 lb 7.9 oz)   12/13/24 103 kg (227 lb)   12/12/24 103 kg (227 lb)     Continue metoprolol, losartan  Presently receiving gentle IV fluid hydration  Low-salt diet  Monitor I/, daily weights    Prostate cancer (HCC)  History of prostate cancer follows with oncology receiving chemotherapy  Outpatient oncology inputs noted  Outpatient follow-up  CAD (coronary artery disease)  Continue aspirin metoprolol atorvastatin  PAD (peripheral artery disease) (HCC)  History of extensive peripheral arterial disease  Patient with gangrene of right toe  Continue aspirin Plavix statin  Pentoxifylline  Outpatient vascular surgery follow-up  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)  Analgesics  Outpatient follow-up  Cancer-related pain  Oxycodone as needed  Supportive cares  Ambulatory dysfunction  Multifactorial  Safe ambulation  Fall precautions  Electrolyte imbalance  With multiple electrolyte imbalances  Noted to be hypokalemic hypophosphatemic  hypomagnesemia and hyponatremia  Replete and monitor    Class 1 obesity due to excess calories with body mass index (BMI) of 33.0 to 33.9 in adult  Therapeutic lifestyle modification encouraged  Dry gangrene (HCC)  Dry gangrene right big toe with history of peripheral disease  Vascular surgery plans for outpatient follow-up noted    Hyponatremia  Likely multifactorial with dehydration contributing  Will provide IV fluids  Monitor      VTE Pharmacologic Prophylaxis: VTE Score: 8 High Risk (Score >/= 5) - Pharmacological DVT Prophylaxis Ordered: enoxaparin (Lovenox). Sequential Compression Devices Ordered.  Code Status: Level 1 - Full Code       Discussion with family:  Discussed with the patient, updated son Rodrigue in detail questions answered.     Anticipated Length of Stay: Patient will be admitted on an inpatient basis with an anticipated length of stay of greater than 2 midnights secondary to generalized weakness, episodes of diarrhea, electrolyte imbalance for management as outlined.    History of Present Illness   Chief Complaint:     Generalized weakness    Royce Rene is a 79 y.o. male with a PMH of prostatic prostate cancer receiving chemotherapy, peripheral artery disease, gangrene of right big toe, ambulatory dysfunction, obesity, history of coronary disease, history of chronic congestive heart failure who presents with generalized weakness.    Patient is receiving chemotherapy for metastatic prostate cancer with oncology, he reports he received chemotherapy last week.  Since then he is having episodes of diarrhea nausea vomiting.  He is having poor p.o. intake.  Additional history obtained from son reports he has been feeling very weak with difficulty ambulating.  His appetite is poor.    No history of fever chills sweats constitutional symptoms.  Denies abdominal pain.  No history of hematemesis melena hematochezia.  No history of chest pain shortness of breath palpitations presyncope syncope.  Denies  urinary symptoms.    He reports right ankle pain and difficulty bearing weight and ambulating.  He has dry gangrene right big toe and follows with vascular surgery with plans for intervention on     History chart labs medications outpatient notes reviewed in epic      Review of Systems   All other systems reviewed and are negative.      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    Coronary artery disease     S/p stenting to circumflex and RCA    HFrEF (heart failure with reduced ejection fraction) ([facility]) 2021    High cholesterol     Pacemaker     Prostate cancer ([facility])      Past Surgical History:   Procedure Laterality Date    CARDIAC CATHETERIZATION      CARDIAC ELECTROPHYSIOLOGY PROCEDURE N/A 2021    Procedure: Implant ICD - bi ventricular;  Surgeon: Jonas Oviedo MD;  Location: [facility] CARDIAC CATH LAB;  Service: Cardiology    COLONOSCOPY      IR LOWER EXTREMITY ANGIOGRAM  2023    IR LOWER EXTREMITY ANGIOGRAM  10/30/2024    MN TEAEC W/WO PATCH GRAFT COMMON FEMORAL Right 2021    Procedure: ENDARTERECTOMY ARTERIAL FEMORAL;  Surgeon: Serina Cook DO;  Location: [facility];  Service: Vascular     Social History     Tobacco Use    Smoking status: Former     Current packs/day: 0.00     Types: Cigarettes     Start date: 1962     Quit date: 2002     Years since quittin.5     Passive exposure: Past    Smokeless tobacco: Never   Vaping Use    Vaping status: Never Used   Substance and Sexual Activity    Alcohol use: Not Currently    Drug use: Not Currently    Sexual activity: Not on file     E-Cigarette/Vaping    E-Cigarette Use Never User      E-Cigarette/Vaping Substances    Nicotine No     THC No     CBD No     Flavoring No     Other No     Unknown No      Family History   Problem Relation Age of Onset    Cancer Mother      Social History:  Marital Status:    Occupation:   Patient Pre-hospital Living  Situation: Home  Patient Pre-hospital Level of Mobility:  Ambulatory dysfunction  Patient Pre-hospital Diet Restrictions: no    Meds/Allergies   I have reviewed home medications with patient family member.  Prior to Admission medications    Medication Sig Start Date End Date Taking? Authorizing Provider   Accu-Chek FastClix Lancets MISC  1/5/24   Historical Provider, MD   acetaminophen (TYLENOL) 500 mg tablet Take 2 tablets (1,000 mg total) by mouth 3 (three) times a day as needed for mild pain 3/23/23   Gavin Andrew MD   Alcohol Swabs (Alcohol Pads) 70 % PADS USE 2-3 TIMES AS DIRECTED. 1/5/24   Historical Provider, MD   aspirin 81 mg chewable tablet Chew 1 tablet (81 mg total) daily 11/14/24   Anne Chandra MD   atorvastatin (LIPITOR) 80 mg tablet Take 1 tablet (80 mg total) by mouth daily with dinner 11/14/24   Anne Chandra MD   bismuth subsalicylate (PEPTO BISMOL) 262 MG chewable tablet Chew 524 mg As needed    Historical Provider, MD   Blood Glucose Monitoring Suppl (Accu-Chek Guide Me) w/Device KIT USE AS DIRECTED 4/22/24   Amanda Keenan MD   clopidogrel (PLAVIX) 75 mg tablet Take 1 tablet (75 mg total) by mouth daily 11/14/24   Anne Chandra MD   cyanocobalamin (VITAMIN B-12) 1000 MCG tablet Take 1 tablet (1,000 mcg total) by mouth daily 11/14/24   Anne Chandra MD   Empagliflozin (Jardiance) 10 MG TABS tablet TAKE 1 TABLET (10 MG TOTAL) BY MOUTH EVERY MORNING 8/23/24   Kelsy Trejo MD   ezetimibe (ZETIA) 10 mg tablet Take 1 tablet (10 mg total) by mouth daily 9/16/24 3/15/25  Kelsy Trejo MD   famotidine (PEPCID) 20 mg tablet Take 1 tablet (20 mg total) by mouth daily 3/14/23 12/19/24  Silverio Bhandari MD   ferrous sulfate 325 (65 Fe) mg tablet Take 1 tablet (325 mg total) by mouth daily 11/14/24   Anne Chandra MD   furosemide (LASIX) 20 mg tablet TAKE 2 TABLETS (40 MG TOTAL) BY MOUTH DAILY 11/15/24   Kelsy Trejo MD   gabapentin (NEURONTIN) 300  mg capsule Take 2 capsules (600 mg total) by mouth 2 (two) times a day 4/15/24   Gavin Andrew MD   glucose blood (Accu-Chek Guide) test strip TEST 2-3 TIMES AS DIRECTED 5/16/24   Kelsy Trejo MD   Lancet Devices (Lancing Device) MISC TID 9/12/23   Amanda Keenan MD   levofloxacin (LEVAQUIN) 500 mg tablet Take 1 tablet (500 mg total) by mouth every 24 hours for 14 days 12/12/24 12/26/24  Zachery Montes DPM   loperamide (IMODIUM) 2 mg capsule Take 1 capsule (2 mg total) by mouth 4 (four) times a day as needed for diarrhea 3/14/23   Silverio Bhandari MD   losartan (COZAAR) 25 mg tablet TAKE 0.5 TABLETS (12.5 MG TOTAL) BY MOUTH DAILY 6/18/24   Kelsy Trejo MD   methocarbamol (ROBAXIN) 500 mg tablet TAKE 1 TABLET (500 MG TOTAL) BY MOUTH 3 (THREE) TIMES A DAY 12/19/24   OMAIRA Carmona   metoprolol succinate (TOPROL-XL) 25 mg 24 hr tablet Take 1 tablet (25 mg total) by mouth daily 5/30/24   Kelsy Trejo MD   ondansetron (ZOFRAN) 4 mg tablet Take 1 tablet (4 mg total) by mouth every 8 (eight) hours as needed for nausea or vomiting 7/26/24   Jung Hernandez MD   oxyCODONE (Roxicodone) 5 immediate release tablet Take 1 tablet (5 mg total) by mouth 2 (two) times a day as needed (wound pain) Max Daily Amount: 10 mg 12/3/24   OMAIRA Carmona   pentoxifylline (TRENtal) 400 mg ER tablet Take 1 tablet (400 mg total) by mouth 3 (three) times a day with meals 11/3/24 12/19/24  OMAIRA Hamm   polyethylene glycol (GLYCOLAX) 17 GM/SCOOP powder Take 17 g by mouth every other day Monday Wednesday Friday 5/13/24   Gavin Andrew MD   potassium chloride (Klor-Con M20) 20 mEq tablet TAKE 1 TABLET (20 MEQ TOTAL) BY MOUTH DAILY 10/9/24 4/7/25  Anne Chandra MD   predniSONE 5 mg tablet TAKE 1 TABLET (5 MG TOTAL) BY MOUTH 2 (TWO) TIMES A DAY WITH MEALS 11/18/24   Jung Hernandez MD     No Known Allergies    Objective :  Temp:  [98.4 °F (36.9 °C)] 98.4 °F (36.9  °C)  HR:  [] 93  BP: ()/(40-56) 108/55  Resp:  [16] 16  SpO2:  [94 %-98 %] 95 %  O2 Device: None (Room air)    Physical Exam       Comfortably in bed  Obese  Features of protein calorie malnutrition noted  Asthenia noted  Neck Short and thick  Lungs diminished breath sounds bilaterally  Heart sounds S1 and S2 noted  Heart sounds are distant  Abdomen soft nontender  Abdominal obesity noted  Awake follows commands  Right big toe gangrene noted  No rash      Lines/Drains:      Central Line:  Goal for removal:  Port-A-Cath, history of metastatic cancer          Lab Results: I have reviewed the following results:  Results from last 7 days   Lab Units 12/26/24  1155   WBC Thousand/uL 14.11*   HEMOGLOBIN g/dL 10.2*   HEMATOCRIT % 30.3*   PLATELETS Thousands/uL 274   SEGS PCT % 86*   LYMPHO PCT % 5*   MONO PCT % 7   EOS PCT % 1     Results from last 7 days   Lab Units 12/26/24  1155   SODIUM mmol/L 132*   POTASSIUM mmol/L 3.3*   CHLORIDE mmol/L 101   CO2 mmol/L 19*   BUN mg/dL 15   CREATININE mg/dL 0.84   ANION GAP mmol/L 12   CALCIUM mg/dL 7.3*   ALBUMIN g/dL 3.6   TOTAL BILIRUBIN mg/dL 0.65   ALK PHOS U/L 86   ALT U/L 13   AST U/L 14   GLUCOSE RANDOM mg/dL 111             Lab Results   Component Value Date    HGBA1C 5.5 10/23/2024     Results from last 7 days   Lab Units 12/26/24  1155   LACTIC ACID mmol/L 1.8       Imaging Results Review: I personally reviewed the following image studies/reports in PACS and discussed pertinent findings with Radiology: xray(s). My interpretation of the radiology images/reports is: Lab results reviewed.        ** Please Note: This note has been constructed using a voice recognition system. **

## 2024-12-26 NOTE — ASSESSMENT & PLAN NOTE
With multiple electrolyte imbalances  Noted to be hypokalemic hypophosphatemic hypomagnesemia and hyponatremia  Replete and monitor

## 2024-12-26 NOTE — ASSESSMENT & PLAN NOTE
Patient reports right ankle pain  No obvious erythema swelling  X-ray reveals some sclerosis of calcaneus possible stress injury  Check uric acid CRP  Analgesics  Supportive cares

## 2024-12-26 NOTE — ASSESSMENT & PLAN NOTE
Presents with generalized weakness, failure to thrive poor appetite episodes of loose stools.  Denies fever chills sweats constitutional symptoms  Likely multifactorial  Supportive cares  Physical therapy

## 2024-12-26 NOTE — ASSESSMENT & PLAN NOTE
Wt Readings from Last 3 Encounters:   12/26/24 98.2 kg (216 lb 7.9 oz)   12/13/24 103 kg (227 lb)   12/12/24 103 kg (227 lb)     Continue metoprolol, losartan  Presently receiving gentle IV fluid hydration  Low-salt diet  Monitor I/, daily weights

## 2024-12-26 NOTE — ASSESSMENT & PLAN NOTE
Patient with episodes of diarrhea since receiving chemotherapy last week.  He also reports some nausea and vomiting.  Poor p.o. intake  Check stool studies  Likely chemotherapy associated  Supportive cares  Replete electrolytes  Monitor

## 2024-12-27 LAB
ALBUMIN SERPL BCG-MCNC: 3.4 G/DL (ref 3.5–5)
ALP SERPL-CCNC: 70 U/L (ref 34–104)
ALT SERPL W P-5'-P-CCNC: 11 U/L (ref 7–52)
ANION GAP SERPL CALCULATED.3IONS-SCNC: 10 MMOL/L (ref 4–13)
AST SERPL W P-5'-P-CCNC: 15 U/L (ref 13–39)
BASOPHILS # BLD AUTO: 0.05 THOUSANDS/ÂΜL (ref 0–0.1)
BASOPHILS NFR BLD AUTO: 0 % (ref 0–1)
BILIRUB SERPL-MCNC: 0.54 MG/DL (ref 0.2–1)
BUN SERPL-MCNC: 13 MG/DL (ref 5–25)
CALCIUM ALBUM COR SERPL-MCNC: 7.5 MG/DL (ref 8.3–10.1)
CALCIUM SERPL-MCNC: 7 MG/DL (ref 8.4–10.2)
CHLORIDE SERPL-SCNC: 107 MMOL/L (ref 96–108)
CO2 SERPL-SCNC: 20 MMOL/L (ref 21–32)
CREAT SERPL-MCNC: 0.78 MG/DL (ref 0.6–1.3)
EOSINOPHIL # BLD AUTO: 0.03 THOUSAND/ÂΜL (ref 0–0.61)
EOSINOPHIL NFR BLD AUTO: 0 % (ref 0–6)
ERYTHROCYTE [DISTWIDTH] IN BLOOD BY AUTOMATED COUNT: 14.9 % (ref 11.6–15.1)
GFR SERPL CREATININE-BSD FRML MDRD: 85 ML/MIN/1.73SQ M
GLUCOSE SERPL-MCNC: 93 MG/DL (ref 65–140)
HCT VFR BLD AUTO: 29.2 % (ref 36.5–49.3)
HGB BLD-MCNC: 9.3 G/DL (ref 12–17)
IMM GRANULOCYTES # BLD AUTO: 0.11 THOUSAND/UL (ref 0–0.2)
IMM GRANULOCYTES NFR BLD AUTO: 1 % (ref 0–2)
LYMPHOCYTES # BLD AUTO: 0.81 THOUSANDS/ÂΜL (ref 0.6–4.47)
LYMPHOCYTES NFR BLD AUTO: 7 % (ref 14–44)
MAGNESIUM SERPL-MCNC: 1.9 MG/DL (ref 1.9–2.7)
MCH RBC QN AUTO: 29.7 PG (ref 26.8–34.3)
MCHC RBC AUTO-ENTMCNC: 31.8 G/DL (ref 31.4–37.4)
MCV RBC AUTO: 93 FL (ref 82–98)
MONOCYTES # BLD AUTO: 1.1 THOUSAND/ÂΜL (ref 0.17–1.22)
MONOCYTES NFR BLD AUTO: 9 % (ref 4–12)
NEUTROPHILS # BLD AUTO: 9.8 THOUSANDS/ÂΜL (ref 1.85–7.62)
NEUTS SEG NFR BLD AUTO: 83 % (ref 43–75)
NRBC BLD AUTO-RTO: 0 /100 WBCS
PHOSPHATE SERPL-MCNC: 1.9 MG/DL (ref 2.3–4.1)
PLATELET # BLD AUTO: 281 THOUSANDS/UL (ref 149–390)
PMV BLD AUTO: 10.2 FL (ref 8.9–12.7)
POTASSIUM SERPL-SCNC: 3.3 MMOL/L (ref 3.5–5.3)
PROT SERPL-MCNC: 5.8 G/DL (ref 6.4–8.4)
RBC # BLD AUTO: 3.13 MILLION/UL (ref 3.88–5.62)
SODIUM SERPL-SCNC: 137 MMOL/L (ref 135–147)
WBC # BLD AUTO: 11.9 THOUSAND/UL (ref 4.31–10.16)

## 2024-12-27 PROCEDURE — 87505 NFCT AGENT DETECTION GI: CPT | Performed by: INTERNAL MEDICINE

## 2024-12-27 PROCEDURE — 99222 1ST HOSP IP/OBS MODERATE 55: CPT | Performed by: PODIATRIST

## 2024-12-27 PROCEDURE — 89055 LEUKOCYTE ASSESSMENT FECAL: CPT | Performed by: INTERNAL MEDICINE

## 2024-12-27 PROCEDURE — 99232 SBSQ HOSP IP/OBS MODERATE 35: CPT | Performed by: INTERNAL MEDICINE

## 2024-12-27 PROCEDURE — 85025 COMPLETE CBC W/AUTO DIFF WBC: CPT | Performed by: INTERNAL MEDICINE

## 2024-12-27 PROCEDURE — 80053 COMPREHEN METABOLIC PANEL: CPT | Performed by: INTERNAL MEDICINE

## 2024-12-27 PROCEDURE — 84100 ASSAY OF PHOSPHORUS: CPT | Performed by: INTERNAL MEDICINE

## 2024-12-27 PROCEDURE — 83735 ASSAY OF MAGNESIUM: CPT | Performed by: INTERNAL MEDICINE

## 2024-12-27 RX ADMIN — LOSARTAN POTASSIUM 12.5 MG: 25 TABLET, FILM COATED ORAL at 08:35

## 2024-12-27 RX ADMIN — PENTOXIFYLLINE 400 MG: 400 TABLET, EXTENDED RELEASE ORAL at 12:44

## 2024-12-27 RX ADMIN — PENTOXIFYLLINE 400 MG: 400 TABLET, EXTENDED RELEASE ORAL at 08:34

## 2024-12-27 RX ADMIN — FERROUS SULFATE TAB 325 MG (65 MG ELEMENTAL FE) 325 MG: 325 (65 FE) TAB at 08:34

## 2024-12-27 RX ADMIN — OXYCODONE HYDROCHLORIDE 5 MG: 5 TABLET ORAL at 10:05

## 2024-12-27 RX ADMIN — ONDANSETRON 4 MG: 2 INJECTION INTRAMUSCULAR; INTRAVENOUS at 16:28

## 2024-12-27 RX ADMIN — EZETIMIBE 10 MG: 10 TABLET ORAL at 08:34

## 2024-12-27 RX ADMIN — POTASSIUM CHLORIDE 20 MEQ: 1500 TABLET, EXTENDED RELEASE ORAL at 08:34

## 2024-12-27 RX ADMIN — GABAPENTIN 600 MG: 300 CAPSULE ORAL at 08:34

## 2024-12-27 RX ADMIN — Medication 1000 MCG: at 08:34

## 2024-12-27 RX ADMIN — EMPAGLIFLOZIN 10 MG: 10 TABLET, FILM COATED ORAL at 08:34

## 2024-12-27 RX ADMIN — PREDNISONE 5 MG: 10 TABLET ORAL at 08:34

## 2024-12-27 RX ADMIN — FAMOTIDINE 20 MG: 20 TABLET, FILM COATED ORAL at 08:34

## 2024-12-27 RX ADMIN — SODIUM CHLORIDE 75 ML/HR: 0.9 INJECTION, SOLUTION INTRAVENOUS at 05:16

## 2024-12-27 RX ADMIN — METHOCARBAMOL TABLETS 500 MG: 500 TABLET, COATED ORAL at 15:45

## 2024-12-27 RX ADMIN — CLOPIDOGREL BISULFATE 75 MG: 75 TABLET ORAL at 08:35

## 2024-12-27 RX ADMIN — DIBASIC SODIUM PHOSPHATE, MONOBASIC POTASSIUM PHOSPHATE AND MONOBASIC SODIUM PHOSPHATE 1 TABLET: 852; 155; 130 TABLET ORAL at 21:12

## 2024-12-27 RX ADMIN — METHOCARBAMOL TABLETS 500 MG: 500 TABLET, COATED ORAL at 08:34

## 2024-12-27 RX ADMIN — DIBASIC SODIUM PHOSPHATE, MONOBASIC POTASSIUM PHOSPHATE AND MONOBASIC SODIUM PHOSPHATE 1 TABLET: 852; 155; 130 TABLET ORAL at 12:44

## 2024-12-27 RX ADMIN — ASPIRIN 81 MG CHEWABLE TABLET 81 MG: 81 TABLET CHEWABLE at 08:34

## 2024-12-27 RX ADMIN — GABAPENTIN 600 MG: 300 CAPSULE ORAL at 18:27

## 2024-12-27 RX ADMIN — PREDNISONE 5 MG: 10 TABLET ORAL at 15:44

## 2024-12-27 RX ADMIN — DIBASIC SODIUM PHOSPHATE, MONOBASIC POTASSIUM PHOSPHATE AND MONOBASIC SODIUM PHOSPHATE 1 TABLET: 852; 155; 130 TABLET ORAL at 15:45

## 2024-12-27 RX ADMIN — Medication 524 MG: at 16:32

## 2024-12-27 RX ADMIN — METOPROLOL SUCCINATE 25 MG: 25 TABLET, EXTENDED RELEASE ORAL at 08:34

## 2024-12-27 RX ADMIN — ACETAMINOPHEN 650 MG: 325 TABLET, FILM COATED ORAL at 09:24

## 2024-12-27 RX ADMIN — METHOCARBAMOL TABLETS 500 MG: 500 TABLET, COATED ORAL at 21:12

## 2024-12-27 RX ADMIN — ATORVASTATIN CALCIUM 80 MG: 40 TABLET, FILM COATED ORAL at 15:45

## 2024-12-27 RX ADMIN — ENOXAPARIN SODIUM 40 MG: 40 INJECTION SUBCUTANEOUS at 08:35

## 2024-12-27 RX ADMIN — PENTOXIFYLLINE 400 MG: 400 TABLET, EXTENDED RELEASE ORAL at 15:45

## 2024-12-27 NOTE — ED NOTES
"Pt requesting to get onto commode to void. Patient assisted to bed with 2 RNS and stated that he felt \"dizzy.\" Patient safely assisted back to bed and BP was 67/37 HR:96bpm. Pt denies feeling dizzy while laying bed. Notified attending and new order received.     Dionicio Aguilera RN  12/26/24 1946    "

## 2024-12-27 NOTE — WOUND OSTOMY CARE
"Consult Note - Wound   Royce L Free 79 y.o. male MRN: [id number]  Unit/Bed#: S -01 Encounter: [id number]        History and Present Illness:  Patient is a 78 yo male that was admitted to [facility]  for treatment of generalized weakness. Patient has a PMH of CAD, CHF,  prostatic prostate cancer receiving chemotherapy, peripheral artery disease, gangrene of right big toe, ambulatory dysfunction, obesity. Patient is alert and oriented times 3 and agreeable to assessment. Patient is assist of one for turning and repositioning, assist with care, independent with meals. Patient is continent of bowel and bladder. On assessment, patient is in bed with pillows in place for offloading.    Wound Care was consulted for bilateral foot wounds. Patient with known PAD and gangrene of R great toe; follows with podiatry and vascular as outpatient. Podiatry inpatient consult pending. Outpatient intervention with vascular planned for 1/2/25.     Assessment Findings:   B/L heels are dry intact and ronald slowly with no skin loss or wounds present. Recommend preventative Hydraguard Cream and proper offloading/ repositioning.      B/L sacro-buttocks is dry, intact, pink in color and blanches. No skin loss or wounds present. Recommend preventative hydragaurd to area.     R great toe / R foot -  intact area of dry gangrene over great toe. No open aspects or drainage. Selene wound skin is intact, fragile, and mottled. R 2nd toe is edematous and fragile.     Left Lateral foot - irregular shaped area of full thickness skin loss. Wound bed is covered yellow/brown dry adherent tissue. No drainage appreciated.     Left lateral face, atypical wound - round shaped full thickness wound. Wound bed is hypergranular with dry pink tissue. No drainage appreciated. Selene wound is dry and intact. Etiology unclear; patient notes wound has been there \"for five or ten years and just wont heal.\" Patient previously recommended to follow up with dermatology.     No " induration, fluctuance, odor, warmth/temperature differences, redness, or purulence noted to the above noted wounds and skin areas assessed. New dressings applied per orders listed below. Patient tolerated well- no s/s of non-verbal pain or discomfort observed during the encounter. Bedside nurse aware of plan of care. See flow sheets for more detailed assessment findings.      Orders listed below and wound care will continue to follow, call or Secure Chat with questions.     Skin care plans:  1-Hydraguard to bilateral sacrum, buttock and heels BID and PRN  2-Elevate heels to offload pressure.  3-Ehob cushion in chair when out of bed.  4-Moisturize skin daily with skin nourishing cream.  5-Turn/reposition q2h for pressure re-distribution on skin.  6-cleanse Left facial wound with NSS soaked gauze. Pat dry. Cover wound bed with 3M no sting barrier daily.   7-R great toe /  Left lateral foot: Gently Cleanse with gauze lightly moistened with NSS. Pat dry. Paint Betadine directly to wound bed. Cover with  4 x 4 gauze.(Toe wound) / ABD (left foot)  Wrap with [place]. Change daily or PRN for soilage/dislodgement.      Wounds:  Wound 10/22/24 Other (comment) Toe D1, great Distal;Right (Active)   Wound Image     12/27/24 1203   Wound Description Gangrene;Dark edges;Black;Necrotic;Swelling 12/27/24 1203   Selene-wound Assessment Fragile 12/27/24 1203   Wound Length (cm) 4 cm 12/27/24 1203   Wound Width (cm) 2 cm 12/27/24 1203   Wound Depth (cm) 0 cm 12/27/24 1203   Wound Surface Area (cm^2) 8 cm^2 12/27/24 1203   Wound Volume (cm^3) 0 cm^3 12/27/24 1203   Calculated Wound Volume (cm^3) 0 cm^3 12/27/24 1203   Change in Wound Size % 100 12/27/24 1203   Drainage Amount None 12/27/24 1203   Non-staged Wound Description Not applicable 12/27/24 1203   Treatments Cleansed;Site care;Elevated 12/27/24 1203   Dressing Other (Comment);Dry dressing 12/27/24 1203   Wound packed? No 12/27/24 1203   Packing- # removed 0 12/27/24 1203   Packing-  # inserted 0 12/27/24 1203   Dressing Changed New 12/27/24 1203   Patient Tolerance Tolerated well 12/27/24 1203   Dressing Status Clean;Dry;Intact 12/27/24 1203       Wound 10/22/24 Foot Left;Lateral (Active)   Wound Image   12/27/24 1159   Wound Description Dry;Brown;Yellow 12/27/24 1159   Selene-wound Assessment Hyperpigmented 12/27/24 1159   Wound Length (cm) 3.7 cm 12/27/24 1159   Wound Width (cm) 3 cm 12/27/24 1159   Wound Depth (cm) 0 cm 12/27/24 1159   Wound Surface Area (cm^2) 11.1 cm^2 12/27/24 1159   Wound Volume (cm^3) 0 cm^3 12/27/24 1159   Calculated Wound Volume (cm^3) 0 cm^3 12/27/24 1159   Change in Wound Size % 100 12/27/24 1159   Drainage Amount None 12/27/24 1159   Non-staged Wound Description Not applicable 12/27/24 1159   Treatments Cleansed;Site care 12/27/24 1159   Dressing Other (Comment);ABD;Dry dressing 12/27/24 1159   Wound packed? No 12/27/24 1159   Packing- # removed 0 12/27/24 1159   Packing- # inserted 0 12/27/24 1159   Dressing Changed New 12/27/24 1159   Patient Tolerance Tolerated well 12/27/24 1159   Dressing Status Clean;Dry;Intact 12/27/24 1159       Wound 10/24/24 Other (comment) Face Left (Active)   Wound Image   12/27/24 1211   Wound Description Hypergranulation;Pink 12/27/24 1211   Selene-wound Assessment Intact;Dry 12/27/24 1211   Wound Length (cm) 1.5 cm 12/27/24 1211   Wound Width (cm) 2 cm 12/27/24 1211   Wound Depth (cm) 0 cm 12/27/24 1211   Wound Surface Area (cm^2) 3 cm^2 12/27/24 1211   Wound Volume (cm^3) 0 cm^3 12/27/24 1211   Calculated Wound Volume (cm^3) 0 cm^3 12/27/24 1211   Change in Wound Size % 100 12/27/24 1211   Drainage Amount None 12/27/24 1211   Non-staged Wound Description Not applicable 12/27/24 1211   Treatments Cleansed;Site care 12/27/24 1211   Dressing Other (Comment);Open to air 12/27/24 1211               Odalis Gomez RN, BSN, CCRN

## 2024-12-27 NOTE — ASSESSMENT & PLAN NOTE
Baseline- close to wheelchair bounded- minimal walk.  Multifactorial  Safe ambulation  Fall precautions  PT/OT

## 2024-12-27 NOTE — ASSESSMENT & PLAN NOTE
Wt Readings from Last 3 Encounters:   12/26/24 100 kg (220 lb 14.4 oz)   12/13/24 103 kg (227 lb)   12/12/24 103 kg (227 lb)     Home diuretic: PO Lasix 40mg QD- hold for now  Continue metoprolol, losartan.  Gentle IVF with NS for now  Low-salt diet  Monitor I/o, daily weights

## 2024-12-27 NOTE — DISCHARGE INSTR - OTHER ORDERS
Skin care plans:  1-Hydraguard to bilateral sacrum, buttock and heels BID and PRN  2-Elevate heels to offload pressure.  3-Ehob cushion in chair when out of bed.  4-Moisturize skin daily with skin nourishing cream.  5-Turn/reposition q2h for pressure re-distribution on skin.  6-cleanse Left facial wound with NSS soaked gauze. Pat dry. Cover wound bed with 3M no sting barrier daily.   7-R great toe /  Left lateral foot: Gently Cleanse with gauze lightly moistened with NSS. Pat dry. Paint Betadine directly to wound bed. Cover with  4 x 4 gauze.(Toe wound) / ABD (left foot)  Wrap with [place]. Change daily or PRN for soilage/dislodgement.

## 2024-12-27 NOTE — PLAN OF CARE
Problem: Prexisting or High Potential for Compromised Skin Integrity  Goal: Skin integrity is maintained or improved  Description: INTERVENTIONS:  - Identify patients at risk for skin breakdown  - Assess and monitor skin integrity  - Assess and monitor nutrition and hydration status  - Monitor labs   - Assess for incontinence   - Turn and reposition patient  - Assist with mobility/ambulation  - Relieve pressure over bony prominences  - Avoid friction and shearing  - Provide appropriate hygiene as needed including keeping skin clean and dry  - Evaluate need for skin moisturizer/barrier cream  - Collaborate with interdisciplinary team   - Patient/family teaching  - Consider wound care consult   Outcome: Progressing     Problem: Nutrition/Hydration-ADULT  Goal: Nutrient/Hydration intake appropriate for improving, restoring or maintaining nutritional needs  Description: Monitor and assess patient's nutrition/hydration status for malnutrition. Collaborate with interdisciplinary team and initiate plan and interventions as ordered.  Monitor patient's weight and dietary intake as ordered or per policy. Utilize nutrition screening tool and intervene as necessary. Determine patient's food preferences and provide high-protein, high-caloric foods as appropriate.     INTERVENTIONS:  - Monitor oral intake, urinary output, labs, and treatment plans  - Assess nutrition and hydration status and recommend course of action  - Evaluate amount of meals eaten  - Assist patient with eating if necessary   - Allow adequate time for meals  - Recommend/ encourage appropriate diets, oral nutritional supplements, and vitamin/mineral supplements  - Order, calculate, and assess calorie counts as needed  - Recommend, monitor, and adjust tube feedings and TPN/PPN based on assessed needs  - Assess need for intravenous fluids  - Provide specific nutrition/hydration education as appropriate  - Include patient/family/caregiver in decisions related to  nutrition  Outcome: Progressing     Problem: INFECTION - ADULT  Goal: Absence or prevention of progression during hospitalization  Description: INTERVENTIONS:  - Assess and monitor for signs and symptoms of infection  - Monitor lab/diagnostic results  - Monitor all insertion sites, i.e. indwelling lines, tubes, and drains  - Monitor endotracheal if appropriate and nasal secretions for changes in amount and color  - [place] appropriate cooling/warming therapies per order  - Administer medications as ordered  - Instruct and encourage patient and family to use good hand hygiene technique  - Identify and instruct in appropriate isolation precautions for identified infection/condition  Outcome: Progressing  Goal: Absence of fever/infection during neutropenic period  Description: INTERVENTIONS:  - Monitor WBC    Outcome: Progressing     Problem: SAFETY ADULT  Goal: Patient will remain free of falls  Description: INTERVENTIONS:  - Educate patient/family on patient safety including physical limitations  - Instruct patient to call for assistance with activity   - Consult OT/PT to assist with strengthening/mobility   - Keep Call bell within reach  - Keep bed low and locked with side rails adjusted as appropriate  - Keep care items and personal belongings within reach  - Initiate and maintain comfort rounds  - Make Fall Risk Sign visible to staff  - Offer Toileting every 2 Hours, in advance of need  - Initiate/Maintain bed/chair alarm  - Obtain necessary fall risk management equipment  - Apply yellow socks and bracelet for high fall risk patients  - Consider moving patient to room near nurses station  Outcome: Progressing  Goal: Maintain or return to baseline ADL function  Description: INTERVENTIONS:  -  Assess patient's ability to carry out ADLs; assess patient's baseline for ADL function and identify physical deficits which impact ability to perform ADLs (bathing, care of mouth/teeth, toileting, grooming, dressing,  etc.)  - Assess/evaluate cause of self-care deficits   - Assess range of motion  - Assess patient's mobility; develop plan if impaired  - Assess patient's need for assistive devices and provide as appropriate  - Encourage maximum independence but intervene and supervise when necessary  - Involve family in performance of ADLs  - Assess for home care needs following discharge   - Consider OT consult to assist with ADL evaluation and planning for discharge  - Provide patient education as appropriate  Outcome: Progressing  Goal: Maintains/Returns to pre admission functional level  Description: INTERVENTIONS:  - Perform AM-PAC 6 Click Basic Mobility/ Daily Activity assessment daily.  - Set and communicate daily mobility goal to care team and patient/family/caregiver.   - Collaborate with rehabilitation services on mobility goals if consulted  - Perform Range of Motion 3 times a day.  - Reposition patient every 2 hours.  - Dangle patient 3 times a day  - Stand patient 3 times a day  - Ambulate patient 3 times a day  - Out of bed to chair 3 times a day   - Out of bed for meals 3 times a day  - Out of bed for toileting  - Record patient progress and toleration of activity level   Outcome: Progressing     Problem: DISCHARGE PLANNING  Goal: Discharge to home or other facility with appropriate resources  Description: INTERVENTIONS:  - Identify barriers to discharge w/patient and caregiver  - Arrange for needed discharge resources and transportation as appropriate  - Identify discharge learning needs (meds, wound care, etc.)  - Arrange for interpretive services to assist at discharge as needed  - Refer to Case Management Department for coordinating discharge planning if the patient needs post-hospital services based on physician/advanced practitioner order or complex needs related to functional status, cognitive ability, or social support system  Outcome: Progressing     Problem: Knowledge Deficit  Goal:  Patient/family/caregiver demonstrates understanding of disease process, treatment plan, medications, and discharge instructions  Description: Complete learning assessment and assess knowledge base.  Interventions:  - Provide teaching at level of understanding  - Provide teaching via preferred learning methods  Outcome: Progressing     Problem: PAIN - ADULT  Goal: Verbalizes/displays adequate comfort level or baseline comfort level  Description: Interventions:  - Encourage patient to monitor pain and request assistance  - Assess pain using appropriate pain scale  - Administer analgesics based on type and severity of pain and evaluate response  - Implement non-pharmacological measures as appropriate and evaluate response  - Consider cultural and social influences on pain and pain management  - Notify physician/advanced practitioner if interventions unsuccessful or patient reports new pain  Outcome: Not Progressing     Problem: GASTROINTESTINAL - ADULT  Goal: Minimal or absence of nausea and/or vomiting  Description: INTERVENTIONS:  - Administer IV fluids if ordered to ensure adequate hydration  - Maintain NPO status until nausea and vomiting are resolved  - Nasogastric tube if ordered  - Administer ordered antiemetic medications as needed  - Provide nonpharmacologic comfort measures as appropriate  - Advance diet as tolerated, if ordered  - Consider nutrition services referral to assist patient with adequate nutrition and appropriate food choices  Outcome: Not Progressing  Goal: Maintains or returns to baseline bowel function  Description: INTERVENTIONS:  - Assess bowel function  - Encourage oral fluids to ensure adequate hydration  - Administer IV fluids if ordered to ensure adequate hydration  - Administer ordered medications as needed  - Encourage mobilization and activity  - Consider nutritional services referral to assist patient with adequate nutrition and appropriate food choices  Outcome: Not Progressing

## 2024-12-27 NOTE — CONSULTS
Consultation - Podiatry   Name: Royce Rene 79 y.o. male I MRN: [id number]  Unit/Bed#: S -01 I Date of Admission: 12/26/2024   Date of Service: 12/27/2024 I Hospital Day: 1  Inpatient consult to Podiatry  Consult performed by: Damien Alvarez DPM  Consult ordered by: Enrrique Lauren MD        Physician Requesting Evaluation: Reginald Vivas MD   Reason for Evaluation / Principal Problem: Right foot dry gangrene    Assessment & Plan  Generalized weakness    Chronic combined systolic and diastolic CHF (congestive heart failure) (HCC)  Wt Readings from Last 3 Encounters:   12/26/24 100 kg (220 lb 14.4 oz)   12/13/24 103 kg (227 lb)   12/12/24 103 kg (227 lb)             Prostate cancer (HCC)    CAD (coronary artery disease)    PAD (peripheral artery disease) (HCC)    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)    Cancer-related pain    Ambulatory dysfunction    Electrolyte imbalance    Class 1 obesity due to excess calories with body mass index (BMI) of 33.0 to 33.9 in adult    Ulcer of left foot, limited to breakdown of skin (HCC)    Dry gangrene (HCC)    Hyponatremia    Diarrhea    Right ankle pain    - The patient was seen at bedside and his dressings were taken down and his wounds examined. They were redressed with betadine paint/DSD B/L. The pedal wounds do not appear to be acutely infected.  - Leukocytosis is tending down; CRP is 179.1; uric acid is 11.5; lactic acid is 1.8.  - X-rays of the right foot were reviewed: there are no signs of OM of the right hallux; sclerotic changes of the calcaneus are age indeterminate, and is of questionable clinical significance.   - Arterial duplex from 10/24 showed an KARLI of 0.49 on the right; 0.67 on the left; GTP's were unobtainable B/L. He is scheduled for a vascular procedure on 1/2/25.  - Agree with Prednisone 5 mg twice daily for suspected acute gouty arthropathy.  - Continue local wound care with betadine paint/DSD B/L. No surgical plans from podiatry at this time.  -  "F/U with vascular surgery as scheduled.      History of Present Illness   Royce Rene is a 79 y.o. male who presents with dry gangrene of the right hallux. The toe has progressively turned color over the past couple of months. Most recently, he has followed with Dr. Montes for his foot wounds. He follows with Dr. Harper for his vascular management. He is seen sitting up in bed, resting in NAD. He currently does not have any pain to either foot.    Review of Systems  I have reviewed the patient's PMH, PSH, Social History, Family History, Meds, and Allergies    Objective :  Temp:  [98.8 °F (37.1 °C)-100.1 °F (37.8 °C)] 100.1 °F (37.8 °C)  HR:  [] 95  BP: ()/(40-71) 121/71  Resp:  [16-19] 19  SpO2:  [91 %-98 %] 92 %  O2 Device: None (Room air)    Physical Exam     (+) dry gangrenous changes of the right great toe that is fairly well demarcated; there is dependent rubor and mottling of skin to both feet; temperature gradient to both lower extremities is increased; there is a fibrous wound to the left foot that does not appear acutely infected; there is no drainage, nor fluctuance to either lower limb; there is no fluctuance nor ST crepitus; there is very mild generalized edema and mild erythema of the right hindfoot /ankle, which given elevated urioc acid level is concerning for acute gouty arthropathy.      Lab Results: I have reviewed the following results:CBC/BMP:   .     12/27/24  0608   WBC 11.90*   HGB 9.3*   HCT 29.2*      SODIUM 137   K 3.3*      CO2 20*   BUN 13   CREATININE 0.78   GLUC 93   MG 1.9   PHOS 1.9*    , Lactic Acid: No new results in last 24 hours. , Procalcitonin: No results found for: \"PROCALCITONI\"      "

## 2024-12-27 NOTE — ASSESSMENT & PLAN NOTE
Patient with episodes of diarrhea since receiving chemotherapy last week.  He also reports some nausea and vomiting.  Poor p.o. intake  Check stool studies- pending  Likely chemotherapy associated  Supportive cares  Replete electrolytes

## 2024-12-27 NOTE — PHYSICAL THERAPY NOTE
Physical Therapy Cancellation Note     12/27/24 1159   Note Type   Note type Cancelled Session   Cancel Reasons Other   Additional Comments referral received for PT eval and tx. 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 will await consult completion and initiate PT eval as medically appropriate and schedule allows.     Duran Malone, PT

## 2024-12-27 NOTE — ASSESSMENT & PLAN NOTE
Patient reports right ankle pain  No obvious erythema swelling  X-ray reveals some sclerosis of calcaneus possible stress injury  Noted elevated uric acid and CRP.  ?Gout  Analgesics  Supportive cares

## 2024-12-27 NOTE — ASSESSMENT & PLAN NOTE
Wt Readings from Last 3 Encounters:   12/26/24 100 kg (220 lb 14.4 oz)   12/13/24 103 kg (227 lb)   12/12/24 103 kg (227 lb)

## 2024-12-27 NOTE — ASSESSMENT & PLAN NOTE
Presents with generalized weakness, failure to thrive poor appetite episodes of loose stools.  Denies fever chills sweats constitutional symptoms  Likely multifactorial  Follow stool study  Supportive cares  Physical therapy

## 2024-12-27 NOTE — PROGRESS NOTES
Progress Note - Hospitalist   Name: Royce Rene 79 y.o. male I MRN: [id number]  Unit/Bed#: S -01 I Date of Admission: 12/26/2024   Date of Service: 12/27/2024 I Hospital Day: 1    Assessment & Plan  Generalized weakness  Presents with generalized weakness, failure to thrive poor appetite episodes of loose stools.  Denies fever chills sweats constitutional symptoms  Likely multifactorial  Follow stool study  Supportive cares  Physical therapy  Diarrhea  Patient with episodes of diarrhea since receiving chemotherapy last week.  He also reports some nausea and vomiting.  Poor p.o. intake  Check stool studies- pending  Likely chemotherapy associated  Supportive cares  Replete electrolytes  Right ankle pain  Patient reports right ankle pain  No obvious erythema swelling  X-ray reveals some sclerosis of calcaneus possible stress injury  Noted elevated uric acid and CRP.  ?Gout  Analgesics  Supportive cares    Chronic combined systolic and diastolic CHF (congestive heart failure) (HCC)  Wt Readings from Last 3 Encounters:   12/26/24 100 kg (220 lb 14.4 oz)   12/13/24 103 kg (227 lb)   12/12/24 103 kg (227 lb)     Home diuretic: PO Lasix 40mg QD- hold for now  Continue metoprolol, losartan.  Gentle IVF with NS for now  Low-salt diet  Monitor I/o, daily weights  Prostate cancer (HCC)  History of prostate cancer follows with oncology receiving chemotherapy  Outpatient oncology inputs noted  Outpatient follow-up  CAD (coronary artery disease)  Continue aspirin metoprolol atorvastatin  PAD (peripheral artery disease) (HCC)  History of extensive peripheral arterial disease  Patient with gangrene of right toe  Continue aspirin Plavix statin  Pentoxifylline  Outpatient vascular surgery follow-up  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)  Analgesics  Outpatient follow-up  Cancer-related pain  Oxycodone as needed  Supportive cares  Currently well controlled  Ambulatory dysfunction  Baseline- close to wheelchair bounded-  minimal walk.  Multifactorial  Safe ambulation  Fall precautions  PT/OT  Electrolyte imbalance  With multiple electrolyte imbalances  Noted to be hypokalemic hypophosphatemic hypomagnesemia and hyponatremia  Replete and monitor    Class 1 obesity due to excess calories with body mass index (BMI) of 33.0 to 33.9 in adult  Therapeutic lifestyle modification encouraged  Dry gangrene (HCC)  Dry gangrene right big toe with history of peripheral disease  Vascular surgery plans for outpatient follow-up noted    Hyponatremia  Likely multifactorial with dehydration contributing  Will provide IV fluids  Monitor  Ulcer of left foot, limited to breakdown of skin (HCC)  Pic under media.  Low suspicion for active infection  Podiatry consult- appreciate input.    VTE Pharmacologic Prophylaxis: VTE Score: 8 High Risk (Score >/= 5) - Pharmacological DVT Prophylaxis Ordered: enoxaparin (Lovenox). Sequential Compression Devices Ordered.    Mobility:   Basic Mobility Inpatient Raw Score: 18  JH-HLM Goal: 6: Walk 10 steps or more  JH-HLM Achieved: 3: Sit at edge of bed  JH-HLM Goal NOT achieved. Continue with multidisciplinary rounding and encourage appropriate mobility to improve upon JH-HLM goals.    Patient Centered Rounds: I performed bedside rounds with nursing staff today.   Discussions with Specialists or Other Care Team Provider: Podiatry    Education and Discussions with Family / Patient: Attempted to update  (son) via phone. Left voicemail.     Current Length of Stay: 1 day(s)  Current Patient Status: Inpatient   Certification Statement: The patient will continue to require additional inpatient hospital stay due to pend PT/ OT  Discharge Plan: Anticipate discharge in 24-48 hrs to discharge location to be determined pending rehab evaluations.    Code Status: Level 1 - Full Code    Subjective   Patient was seen and examined this morning. No overnight event. + watery diarrhea. Denied any fever, chills, CP, SOB, N/V,  abdominal pain.    Objective :  Temp:  [98.4 °F (36.9 °C)-100.1 °F (37.8 °C)] 100.1 °F (37.8 °C)  HR:  [] 95  BP: ()/(40-71) 121/71  Resp:  [16-19] 19  SpO2:  [91 %-98 %] 92 %  O2 Device: None (Room air)    Body mass index is 33.59 kg/m².     Input and Output Summary (last 24 hours):     Intake/Output Summary (Last 24 hours) at 12/27/2024 1057  Last data filed at 12/27/2024 0516  Gross per 24 hour   Intake 1700 ml   Output --   Net 1700 ml       Physical Exam  Vitals and nursing note reviewed.   Constitutional:       General: He is not in acute distress.     Appearance: He is well-developed. He is obese. He is ill-appearing.   HENT:      Head: Normocephalic and atraumatic.      Mouth/Throat:      Mouth: Mucous membranes are dry.   Eyes:      General:         Right eye: No discharge.         Left eye: No discharge.      Extraocular Movements: Extraocular movements intact.      Conjunctiva/sclera: Conjunctivae normal.   Cardiovascular:      Rate and Rhythm: Normal rate and regular rhythm.      Pulses: Normal pulses.      Heart sounds: Murmur heard.   Pulmonary:      Effort: Pulmonary effort is normal. No respiratory distress.      Breath sounds: Normal breath sounds. No wheezing or rhonchi.   Abdominal:      General: Bowel sounds are normal. There is no distension.      Palpations: Abdomen is soft.      Tenderness: There is no abdominal tenderness. There is no guarding or rebound.   Musculoskeletal:         General: No swelling.      Cervical back: Normal range of motion and neck supple.   Skin:     General: Skin is warm and dry.      Capillary Refill: Capillary refill takes less than 2 seconds.      Findings: Lesion present.   Neurological:      Mental Status: He is alert and oriented to person, place, and time.   Psychiatric:         Mood and Affect: Mood normal.                     Lines/Drains:      Central Line:  Goal for removal:  chemo port               Lab Results: I have reviewed the following  results:   Results from last 7 days   Lab Units 12/27/24  0608   WBC Thousand/uL 11.90*   HEMOGLOBIN g/dL 9.3*   HEMATOCRIT % 29.2*   PLATELETS Thousands/uL 281   SEGS PCT % 83*   LYMPHO PCT % 7*   MONO PCT % 9   EOS PCT % 0     Results from last 7 days   Lab Units 12/27/24  0608   SODIUM mmol/L 137   POTASSIUM mmol/L 3.3*   CHLORIDE mmol/L 107   CO2 mmol/L 20*   BUN mg/dL 13   CREATININE mg/dL 0.78   ANION GAP mmol/L 10   CALCIUM mg/dL 7.0*   ALBUMIN g/dL 3.4*   TOTAL BILIRUBIN mg/dL 0.54   ALK PHOS U/L 70   ALT U/L 11   AST U/L 15   GLUCOSE RANDOM mg/dL 93                 Results from last 7 days   Lab Units 12/26/24  1155   LACTIC ACID mmol/L 1.8       Recent Cultures (last 7 days):         Imaging Results Review: I reviewed radiology reports from this admission including: xray(s).  Other Study Results Review: No additional pertinent studies reviewed.    Last 24 Hours Medication List:     Current Facility-Administered Medications:     acetaminophen (TYLENOL) tablet 650 mg, Q6H PRN    aspirin chewable tablet 81 mg, Daily    atorvastatin (LIPITOR) tablet 80 mg, Daily With Dinner    bismuth subsalicylate (PEPTO BISMOL) oral suspension 524 mg, Q6H PRN    clopidogrel (PLAVIX) tablet 75 mg, Daily    cyanocobalamin (VITAMIN B-12) tablet 1,000 mcg, Daily    Diclofenac Sodium (VOLTAREN) 1 % topical gel 2 g, 4x Daily    Empagliflozin (JARDIANCE) tablet 10 mg, QAM    enoxaparin (LOVENOX) subcutaneous injection 40 mg, Daily    ezetimibe (ZETIA) tablet 10 mg, Daily    famotidine (PEPCID) tablet 20 mg, Daily    ferrous sulfate tablet 325 mg, Daily    gabapentin (NEURONTIN) capsule 600 mg, BID    loperamide (IMODIUM) capsule 2 mg, 4x Daily PRN    losartan (COZAAR) tablet 12.5 mg, Daily    menthol-methyl salicylate (BENGAY) 10-15 % cream, 4x Daily PRN    methocarbamol (ROBAXIN) tablet 500 mg, TID    metoprolol succinate (TOPROL-XL) 24 hr tablet 25 mg, Daily    ondansetron (ZOFRAN) injection 4 mg, Q6H PRN    oxyCODONE  (ROXICODONE) IR tablet 5 mg, BID PRN    pentoxifylline (TRENtal) ER tablet 400 mg, TID With Meals    potassium chloride (Klor-Con M20) CR tablet 20 mEq, Daily    potassium-sodium phosphateS (K-PHOS,PHOSPHA 250) -250 mg tablet 1 tablet, 4x Daily (with meals and at bedtime)    predniSONE tablet 5 mg, BID With Meals    sodium chloride 0.9 % infusion, Continuous, Last Rate: 75 mL/hr (12/27/24 0516)    Administrative Statements   Today, Patient Was Seen By: Enrrique Lauren MD      **Please Note: This note may have been constructed using a voice recognition system.**

## 2024-12-27 NOTE — UTILIZATION REVIEW
Initial Clinical Review    Admission: Date/Time/Statement:   Admission Orders (From admission, onward)       Ordered        12/26/24 1506  INPATIENT ADMISSION  Once                          Orders Placed This Encounter   Procedures    INPATIENT ADMISSION     Standing Status:   Standing     Number of Occurrences:   1     Level of Care:   Med Surg [16]     Estimated length of stay:   More than 2 Midnights     Certification:   I certify that inpatient services are medically necessary for this patient for a duration of greater than two midnights. See H&P and MD Progress Notes for additional information about the patient's course of treatment.     ED Arrival Information       Expected   12/26/2024     Arrival   12/26/2024 10:55    Acuity   Urgent              Means of arrival   Ambulance    Escorted by   [facility]    Service   Hospitalist    Admission type   Emergency              Arrival complaint   V/D Toe pain             Chief Complaint   Patient presents with    Weakness - Generalized     Onet 5 days ago getting worse       Initial Presentation: 79 y.o. male to ED by EMS for evaluation & management as Inpatient admission due to Generalized weakness, diarrhea, multiple blood electrolyte imbalances  PMH  prostatic prostate cancer receiving chemotherapy, peripheral artery disease, gangrene of right big toe, ambulatory dysfunction, obesity, CAD, chronic congestive heart failure  presents with generalized weakness.     Receiving chemotherapy for metastatic prostate cancer with oncology,  reports he received chemotherapy last week. & Since having episodes of diarrhea nausea vomiting, having poor p.o. intake. Son reports patient very weak with difficulty ambulating. Patient reports right ankle pain w difficulty bearing weight and ambulating. Known dry gangrene right big toe and follows with vascular surgery with plans for intervention on 2 January   EXAM  Right big toe gangrene, XR R foot sclerosis of  calcaneus possible stress injury, ulcer L foot, labs w leukocytosis @ 14.11, hypokalemia hypophosphatemia, hypomagnesemia, hyponatremia     Obtain stool studies, replete electrolytes/ follow trend;  Gentle IVF> obtain uric acid, CRP. Cont analgesia. Cont OP ASA, Plavix, statin, pentoxifylline, OP Vascular. Fall precautions, PT consult.  Anticipated Length of Stay/Certification Statement: Patient will be admitted on an inpatient basis with an anticipated length of stay of greater than 2 midnights secondary to generalized weakness, episodes of diarrhea, electrolyte imbalance for management as outlined.     Date: 12/27   Day 2:   Stool studies pending; following electrolytes, lo NA diet, gentle IVF NS for now, daily WT  Podiatry consult for R foot/ Wound consult & care for ulcer L foot     Podiatry  R ankle pain  dressings were taken down and his wounds examined. They were redressed with betadine paint/DSD B/L. The pedal wounds do not appear to be acutely infected.   Leukocytosis  tending down; CRP is 179.1; uric acid  11.5; lactic acid is 1.8.  X-rays of the right foot no signs of OM of the right hallux; sclerotic changes of the calcaneus are age indeterminate, and is of questionable clinical significance.   Arterial duplex from 10/24 showed an KARLI of 0.49 on the right; 0.67 on the left; GTP's were unobtainable B/L. Scheduled for a vascular procedure on 1/2/25.   Prednisone 5 mg twice daily for suspected acute gouty arthropathy.  Continue local wound care with betadine paint/DSD B/L. No surgical plans from podiatry at this time.  F/U with vascular surgery    Date: 12/28/2024  Day 3: Has surpassed a 2nd midnight with active treatments and services.  Presents s/p 1 wl OP CHEMO w/ episodes of diarrhea nausea vomiting, having poor p.o. intake. Son reports patient very weak with difficulty ambulating  On exam diarrhea improving  Diagnosis/Plan      GEN Weakness  Encourage PO intake  PT DC Dispo eval acute rehab    Diarrhea  Imodium PRN; replete electrolytes. C DIFF/ stool panel neg  Hyponatremia  Improved w IVF & PO intake, cont to monitor  R ankle pain  CRP elevation w uric acid 11.5 for possible gout; Podiatry following wounds not appearing infected  L foot ulcer  Per Podfiatry no surgical plan  Prostate CA w mets to bone  OP Oncology , last CHEMO 12/12 current regimen is Cabazitaxel, Neulasta, prednisone, Lupron, denosumab   ED Treatment-Medication Administration from 12/26/2024 1055 to 12/26/2024 2039         Date/Time Order Dose Route Action     12/26/2024 1203 fentaNYL injection 50 mcg 50 mcg Intravenous Given     12/26/2024 1204 multi-electrolyte (ISOLYTE-S PH 7.4) bolus 500 mL 500 mL Intravenous New Bag     12/26/2024 1203 ondansetron (ZOFRAN) injection 4 mg 4 mg Intravenous Given     12/26/2024 1430 magnesium sulfate IVPB (premix) SOLN 1 g 1 g Intravenous New Bag     12/26/2024 1814 potassium-sodium phosphateS (K-PHOS,PHOSPHA 250) -250 mg tablet 1 tablet 1 tablet Oral Given     12/26/2024 1645 calcium gluconate 1 g in sodium chloride 0.9% 50 mL (premix) 1 g Intravenous New Bag     12/26/2024 1730 atorvastatin (LIPITOR) tablet 80 mg 80 mg Oral Given     12/26/2024 1730 gabapentin (NEURONTIN) capsule 600 mg 600 mg Oral Given     12/26/2024 1731 levofloxacin (LEVAQUIN) tablet 500 mg 500 mg Oral Given     12/26/2024 1731 methocarbamol (ROBAXIN) tablet 500 mg 500 mg Oral Given     12/26/2024 1919 pentoxifylline (TRENtal) ER tablet 400 mg 400 mg Oral Given     12/26/2024 1731 predniSONE tablet 5 mg 5 mg Oral Given     12/26/2024 1735 sodium chloride 0.9 % infusion 75 mL/hr Intravenous New Bag     12/26/2024 1856 potassium phosphates 12 mmol in sodium chloride 0.9 % 250 mL infusion 12 mmol Intravenous New Bag     12/26/2024 1730 pantoprazole (PROTONIX) injection 40 mg 40 mg Intravenous Given     12/26/2024 1739 Diclofenac Sodium (VOLTAREN) 1 % topical gel 2 g 2 g Topical Given     12/26/2024 1930 albumin human  (FLEXBUMIN) 25 % injection 12.5 g 12.5 g Intravenous New Bag            Scheduled Medications:  aspirin, 81 mg, Oral, Daily  atorvastatin, 80 mg, Oral, Daily With Dinner  clopidogrel, 75 mg, Oral, Daily  cyanocobalamin, 1,000 mcg, Oral, Daily  Diclofenac Sodium, 2 g, Topical, 4x Daily  Empagliflozin, 10 mg, Oral, QAM  enoxaparin, 40 mg, Subcutaneous, Daily  ezetimibe, 10 mg, Oral, Daily  famotidine, 20 mg, Oral, Daily  ferrous sulfate, 325 mg, Oral, Daily  gabapentin, 600 mg, Oral, BID  losartan, 12.5 mg, Oral, Daily  methocarbamol, 500 mg, Oral, TID  metoprolol succinate, 25 mg, Oral, Daily  pentoxifylline, 400 mg, Oral, TID With Meals  potassium chloride, 20 mEq, Oral, Daily  potassium-sodium phosphateS, 1 tablet, Oral, 4x Daily (with meals and at bedtime)  predniSONE, 5 mg, Oral, BID With Meals    Continuous IV Infusions:  sodium chloride 0.9 % infusion  Rate: 75 mL/hr Dose: 75 mL/hr  Freq: Continuous Route: IV  Indications of Use: IV Hydration  Last Dose: Stopped (12/27/24 1827)  Start: 12/26/24 1700 End: 12/27/24 1734       PRN Meds:  acetaminophen, 650 mg, Oral, Q6H PRN  bismuth subsalicylate, 524 mg, Oral, Q6H PRN  loperamide, 2 mg, Oral, 4x Daily PRN  menthol-methyl salicylate, , Apply externally, 4x Daily PRN  ondansetron, 4 mg, Intravenous, Q6H PRN  oxyCODONE, 5 mg, Oral, BID PRN    ED Triage Vitals   Temperature Pulse Respirations Blood Pressure SpO2 Pain Score   12/26/24 1105 12/26/24 1105 12/26/24 1105 12/26/24 1220 12/26/24 1105 12/26/24 1105   98.4 °F (36.9 °C) (!) 111 16 92/53 96 % 10 - Worst Possible Pain     Weight (last 2 days)       Date/Time Weight    12/26/24 2129 100 (220.9)    12/26/24 1105 98.2 (216.49)            Vital Signs (last 3 days)       Date/Time Temp Pulse Resp BP MAP (mmHg) SpO2 O2 Device Patient Position - Orthostatic VS Pain    12/28/24 11:32:49 -- 104 -- 127/99 108 92 % -- -- --    12/28/24 11:30:19 -- 93 -- 122/67 85 92 % -- -- --    12/28/24 08:17:22 99.4 °F (37.4 °C) 100 16  104/66 79 93 % -- -- --    12/27/24 2225 98.5 °F (36.9 °C) -- -- 103/65 78 -- None (Room air) Lying --    12/27/24 2100 -- -- -- -- -- -- None (Room air) -- No Pain    12/27/24 16:30:35 97.8 °F (36.6 °C) 88 20 160/92 115 94 % -- -- --    12/27/24 13:34:53 -- 94 -- 139/79 99 91 % -- -- --    12/27/24 1005 -- -- -- -- -- -- -- -- 10 - Worst Possible Pain    12/27/24 0924 -- -- -- -- -- -- -- -- 8    12/27/24 08:27:44 100.1 °F (37.8 °C) 95 19 121/71 88 92 % -- -- --    12/27/24 0822 -- -- -- -- -- -- -- -- 8    12/26/24 23:07:43 98.8 °F (37.1 °C) 96 17 92/53 66 94 % -- -- No Pain    12/26/24 2106 -- -- -- 100/40 -- -- -- -- --    12/26/24 2105 -- 92 -- 88/48 61 92 % -- -- --    12/26/24 21:00:07 99 °F (37.2 °C) 98 18 88/48 61 91 % None (Room air) Lying --    12/26/24 2015 -- 93 -- 86/50 -- 94 % None (Room air) -- --    12/1945 -- 97 18 88/46 65 94 % None (Room air) -- --    12/26/24 1900 -- 101 18 95/52 70 95 % None (Room air) -- No Pain    12/26/24 1800 -- 99 -- 108/69 84 95 % -- -- --    12/26/24 1700 -- 94 16 101/52 73 94 % None (Room air) -- No Pain    12/26/24 1630 -- 93 -- 108/55 79 95 % -- -- --    12/26/24 1430 -- 94 -- 100/40 58 98 % -- -- --    12/26/24 1400 -- 91 -- 113/56 80 95 % -- -- --    12/26/24 1220 -- 96 16 92/53 -- 94 % -- Lying --    12/26/24 1105 98.4 °F (36.9 °C) 111 16 -- -- 96 % None (Room air) -- 10 - Worst Possible Pain              Pertinent Labs/Diagnostic Test Results:   Radiology:  XR foot 3+ views RIGHT   Final Interpretation by Christophe Olivera MD (12/26 1512)      No x-ray evidence of osteomyelitis at this time.      Some sclerosis of the calcaneus is identified of indeterminate etiology. This would be an unusual location for metastatic disease. Subtle stress injury is possible.        Cardiology:  No orders to display     GI:  No orders to display           Results from last 7 days   Lab Units 12/28/24  0528 12/27/24  0608 12/26/24  1155   WBC Thousand/uL 10.24* 11.90* 14.11*  "  HEMOGLOBIN g/dL 8.9* 9.3* 10.2*   HEMATOCRIT % 27.6* 29.2* 30.3*   PLATELETS Thousands/uL 313 281 274   TOTAL NEUT ABS Thousands/µL  --  9.80* 12.03*         Results from last 7 days   Lab Units 12/28/24  0528 12/27/24  0608 12/26/24  1155   SODIUM mmol/L 141 137 132*   POTASSIUM mmol/L 3.3* 3.3* 3.3*   CHLORIDE mmol/L 111* 107 101   CO2 mmol/L 20* 20* 19*   ANION GAP mmol/L 10 10 12   BUN mg/dL 10 13 15   CREATININE mg/dL 0.67 0.78 0.84   EGFR ml/min/1.73sq m 91 85 83   CALCIUM mg/dL 6.9* 7.0* 7.3*   CALCIUM, IONIZED mmol/L 0.97*  --   --    MAGNESIUM mg/dL 1.9 1.9 1.8*   PHOSPHORUS mg/dL 2.2* 1.9* 1.4*     Results from last 7 days   Lab Units 12/27/24  0608 12/26/24  1155   AST U/L 15 14   ALT U/L 11 13   ALK PHOS U/L 70 86   TOTAL PROTEIN g/dL 5.8* 6.3*   ALBUMIN g/dL 3.4* 3.6   TOTAL BILIRUBIN mg/dL 0.54 0.65         Results from last 7 days   Lab Units 12/28/24  0528 12/27/24  0608 12/26/24  1155   GLUCOSE RANDOM mg/dL 102 93 111             No results found for: \"BETA-HYDROXYBUTYRATE\"       Results from last 7 days   Lab Units 12/26/24  1155   PH ROSA  7.419*   PCO2 ROSA mm Hg 28.9*   PO2 ROSA mm Hg 48.3*   HCO3 ROSA mmol/L 18.3*   BASE EXC ROSA mmol/L -5.1   O2 CONTENT ROSA ml/dL 13.7   O2 HGB, VENOUS % 87.1*             Results from last 7 days   Lab Units 12/26/24  1620 12/26/24  1429 12/26/24  1155   HS TNI 0HR ng/L  --   --  118*   HS TNI 2HR ng/L  --  107*  --    HSTNI D2 ng/L  --  -11  --    HS TNI 4HR ng/L 105*  --   --    HSTNI D4 ng/L -13  --   --                      Results from last 7 days   Lab Units 12/26/24  1155   LACTIC ACID mmol/L 1.8           Results from last 7 days   Lab Units 12/26/24  1155   LIPASE u/L 20     Results from last 7 days   Lab Units 12/26/24  1620   CRP mg/L 179.1*                                 Results from last 7 days   Lab Units 12/27/24  1326   C DIFF TOXIN B BY PCR  Negative     Results from last 7 days   Lab Units 12/27/24  1326   SALMONELLA SP PCR  Negative   SHIGELLA " SP/ENTEROINVASIVE E. 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    Coronary artery disease 2001    S/p stenting to circumflex and RCA    HFrEF (heart failure with reduced ejection fraction) (HCC) 06/14/2021    High cholesterol     Pacemaker     Prostate cancer (HCC)      Present on Admission:   Ambulatory dysfunction   CAD (coronary artery disease)   Cancer-related pain   Chronic combined systolic and diastolic CHF (congestive heart failure) (HCC)   Dry gangrene (HCC)   PAD (peripheral artery disease) (HCC)   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)   Prostate cancer (HCC)   Electrolyte imbalance   Ulcer of left foot, limited to breakdown of skin (HCC)      Admitting Diagnosis: Elevated troponin [R79.89]  Age/Sex: 79 y.o. male    Network Utilization Review Department  ATTENTION: Please call with any questions or concerns to [phone] and carefully listen to the prompts so that you are directed to the right person. All voicemails are confidential.   For Discharge needs, contact Care Management DC Support Team at [phone] opt. [phone]  Send all requests for admission clinical reviews, approved or denied determinations and any other requests to dedicated fax number below belonging to the campus where the patient is receiving treatment. List of dedicated fax numbers for the Facilities:  FACILITY NAME UR FAX NUMBER   ADMISSION DENIALS (Administrative/Medical Necessity) [phone]   DISCHARGE SUPPORT TEAM (NETWORK) [phone]   PARENT CHILD HEALTH (Maternity/NICU/Pediatrics) [phone]   [facility] [phone]   [facility] [phone]   [facility] [phone]   [facility] [phone]   [facility] [phone]   [facility]  [facility] [phone]   [facility] [phone]   [facility] [phone]   [facility] [phone]   [facility] [phone]   [facility] [phone]   [facility] [phone]

## 2024-12-28 LAB
ANION GAP SERPL CALCULATED.3IONS-SCNC: 10 MMOL/L (ref 4–13)
BUN SERPL-MCNC: 10 MG/DL (ref 5–25)
C COLI+JEJUNI TUF STL QL NAA+PROBE: NEGATIVE
C DIFF TOX GENS STL QL NAA+PROBE: NEGATIVE
CA-I BLD-SCNC: 0.97 MMOL/L (ref 1.12–1.32)
CALCIUM SERPL-MCNC: 6.9 MG/DL (ref 8.4–10.2)
CHLORIDE SERPL-SCNC: 111 MMOL/L (ref 96–108)
CO2 SERPL-SCNC: 20 MMOL/L (ref 21–32)
CREAT SERPL-MCNC: 0.67 MG/DL (ref 0.6–1.3)
EC STX1+STX2 GENES STL QL NAA+PROBE: NEGATIVE
ERYTHROCYTE [DISTWIDTH] IN BLOOD BY AUTOMATED COUNT: 14.9 % (ref 11.6–15.1)
FERRITIN SERPL-MCNC: 1012 NG/ML (ref 24–336)
FOLATE SERPL-MCNC: 16.9 NG/ML
GFR SERPL CREATININE-BSD FRML MDRD: 91 ML/MIN/1.73SQ M
GLUCOSE SERPL-MCNC: 102 MG/DL (ref 65–140)
HCT VFR BLD AUTO: 27.6 % (ref 36.5–49.3)
HGB BLD-MCNC: 8.9 G/DL (ref 12–17)
IRON SATN MFR SERPL: 10 % (ref 15–50)
IRON SERPL-MCNC: 21 UG/DL (ref 50–212)
MAGNESIUM SERPL-MCNC: 1.9 MG/DL (ref 1.9–2.7)
MCH RBC QN AUTO: 30.2 PG (ref 26.8–34.3)
MCHC RBC AUTO-ENTMCNC: 32.2 G/DL (ref 31.4–37.4)
MCV RBC AUTO: 94 FL (ref 82–98)
PHOSPHATE SERPL-MCNC: 2.2 MG/DL (ref 2.3–4.1)
PLATELET # BLD AUTO: 313 THOUSANDS/UL (ref 149–390)
PMV BLD AUTO: 9.9 FL (ref 8.9–12.7)
POTASSIUM SERPL-SCNC: 3.3 MMOL/L (ref 3.5–5.3)
RBC # BLD AUTO: 2.95 MILLION/UL (ref 3.88–5.62)
SALMONELLA SP SPAO STL QL NAA+PROBE: NEGATIVE
SHIGELLA SP+EIEC IPAH STL QL NAA+PROBE: NEGATIVE
SODIUM SERPL-SCNC: 141 MMOL/L (ref 135–147)
TIBC SERPL-MCNC: 214.2 UG/DL (ref 250–450)
TRANSFERRIN SERPL-MCNC: 153 MG/DL (ref 203–362)
UIBC SERPL-MCNC: 193 UG/DL (ref 155–355)
VIT B12 SERPL-MCNC: 1528 PG/ML (ref 180–914)
WBC # BLD AUTO: 10.24 THOUSAND/UL (ref 4.31–10.16)
WBC SPEC QL GRAM STN: NORMAL

## 2024-12-28 PROCEDURE — 82330 ASSAY OF CALCIUM: CPT

## 2024-12-28 PROCEDURE — 82607 VITAMIN B-12: CPT

## 2024-12-28 PROCEDURE — 83550 IRON BINDING TEST: CPT

## 2024-12-28 PROCEDURE — 97163 PT EVAL HIGH COMPLEX 45 MIN: CPT

## 2024-12-28 PROCEDURE — 82746 ASSAY OF FOLIC ACID SERUM: CPT

## 2024-12-28 PROCEDURE — 83735 ASSAY OF MAGNESIUM: CPT

## 2024-12-28 PROCEDURE — 82728 ASSAY OF FERRITIN: CPT

## 2024-12-28 PROCEDURE — 97530 THERAPEUTIC ACTIVITIES: CPT

## 2024-12-28 PROCEDURE — 99232 SBSQ HOSP IP/OBS MODERATE 35: CPT | Performed by: INTERNAL MEDICINE

## 2024-12-28 PROCEDURE — 83540 ASSAY OF IRON: CPT

## 2024-12-28 PROCEDURE — 80048 BASIC METABOLIC PNL TOTAL CA: CPT

## 2024-12-28 PROCEDURE — 85027 COMPLETE CBC AUTOMATED: CPT

## 2024-12-28 PROCEDURE — 84100 ASSAY OF PHOSPHORUS: CPT

## 2024-12-28 RX ORDER — MAGNESIUM SULFATE HEPTAHYDRATE 40 MG/ML
2 INJECTION, SOLUTION INTRAVENOUS ONCE
Status: COMPLETED | OUTPATIENT
Start: 2024-12-28 | End: 2024-12-28

## 2024-12-28 RX ORDER — CALCIUM GLUCONATE 20 MG/ML
2 INJECTION, SOLUTION INTRAVENOUS ONCE
Status: COMPLETED | OUTPATIENT
Start: 2024-12-28 | End: 2024-12-28

## 2024-12-28 RX ORDER — POTASSIUM CHLORIDE 1500 MG/1
20 TABLET, EXTENDED RELEASE ORAL ONCE
Status: COMPLETED | OUTPATIENT
Start: 2024-12-28 | End: 2024-12-28

## 2024-12-28 RX ADMIN — POTASSIUM CHLORIDE 20 MEQ: 1500 TABLET, EXTENDED RELEASE ORAL at 09:04

## 2024-12-28 RX ADMIN — ACETAMINOPHEN 650 MG: 325 TABLET, FILM COATED ORAL at 18:14

## 2024-12-28 RX ADMIN — METHOCARBAMOL TABLETS 500 MG: 500 TABLET, COATED ORAL at 18:17

## 2024-12-28 RX ADMIN — Medication 524 MG: at 18:14

## 2024-12-28 RX ADMIN — DIBASIC SODIUM PHOSPHATE, MONOBASIC POTASSIUM PHOSPHATE AND MONOBASIC SODIUM PHOSPHATE 1 TABLET: 852; 155; 130 TABLET ORAL at 13:00

## 2024-12-28 RX ADMIN — PREDNISONE 5 MG: 10 TABLET ORAL at 18:15

## 2024-12-28 RX ADMIN — LOPERAMIDE HYDROCHLORIDE 2 MG: 2 CAPSULE ORAL at 22:29

## 2024-12-28 RX ADMIN — METHOCARBAMOL TABLETS 500 MG: 500 TABLET, COATED ORAL at 22:27

## 2024-12-28 RX ADMIN — MAGNESIUM SULFATE HEPTAHYDRATE 2 G: 40 INJECTION, SOLUTION INTRAVENOUS at 10:40

## 2024-12-28 RX ADMIN — CALCIUM GLUCONATE 2 G: 20 INJECTION, SOLUTION INTRAVENOUS at 09:04

## 2024-12-28 RX ADMIN — DIBASIC SODIUM PHOSPHATE, MONOBASIC POTASSIUM PHOSPHATE AND MONOBASIC SODIUM PHOSPHATE 1 TABLET: 852; 155; 130 TABLET ORAL at 09:31

## 2024-12-28 RX ADMIN — FERROUS SULFATE TAB 325 MG (65 MG ELEMENTAL FE) 325 MG: 325 (65 FE) TAB at 09:04

## 2024-12-28 RX ADMIN — PENTOXIFYLLINE 400 MG: 400 TABLET, EXTENDED RELEASE ORAL at 13:00

## 2024-12-28 RX ADMIN — EZETIMIBE 10 MG: 10 TABLET ORAL at 09:04

## 2024-12-28 RX ADMIN — GABAPENTIN 600 MG: 300 CAPSULE ORAL at 18:15

## 2024-12-28 RX ADMIN — DIBASIC SODIUM PHOSPHATE, MONOBASIC POTASSIUM PHOSPHATE AND MONOBASIC SODIUM PHOSPHATE 1 TABLET: 852; 155; 130 TABLET ORAL at 18:15

## 2024-12-28 RX ADMIN — PREDNISONE 5 MG: 10 TABLET ORAL at 09:31

## 2024-12-28 RX ADMIN — CLOPIDOGREL BISULFATE 75 MG: 75 TABLET ORAL at 09:04

## 2024-12-28 RX ADMIN — GABAPENTIN 600 MG: 300 CAPSULE ORAL at 09:04

## 2024-12-28 RX ADMIN — PENTOXIFYLLINE 400 MG: 400 TABLET, EXTENDED RELEASE ORAL at 09:04

## 2024-12-28 RX ADMIN — ASPIRIN 81 MG CHEWABLE TABLET 81 MG: 81 TABLET CHEWABLE at 09:04

## 2024-12-28 RX ADMIN — EMPAGLIFLOZIN 10 MG: 10 TABLET, FILM COATED ORAL at 09:04

## 2024-12-28 RX ADMIN — FAMOTIDINE 20 MG: 20 TABLET, FILM COATED ORAL at 09:04

## 2024-12-28 RX ADMIN — MENTHOL 10%, METHYL SALICYLATE 15%: 10; 15 CREAM TOPICAL at 09:07

## 2024-12-28 RX ADMIN — ATORVASTATIN CALCIUM 80 MG: 40 TABLET, FILM COATED ORAL at 18:15

## 2024-12-28 RX ADMIN — Medication 1000 MCG: at 09:04

## 2024-12-28 RX ADMIN — POTASSIUM CHLORIDE 20 MEQ: 1500 TABLET, EXTENDED RELEASE ORAL at 09:06

## 2024-12-28 RX ADMIN — ENOXAPARIN SODIUM 40 MG: 40 INJECTION SUBCUTANEOUS at 09:03

## 2024-12-28 RX ADMIN — DIBASIC SODIUM PHOSPHATE, MONOBASIC POTASSIUM PHOSPHATE AND MONOBASIC SODIUM PHOSPHATE 1 TABLET: 852; 155; 130 TABLET ORAL at 22:27

## 2024-12-28 RX ADMIN — METHOCARBAMOL TABLETS 500 MG: 500 TABLET, COATED ORAL at 09:04

## 2024-12-28 RX ADMIN — PENTOXIFYLLINE 400 MG: 400 TABLET, EXTENDED RELEASE ORAL at 18:15

## 2024-12-28 NOTE — ASSESSMENT & PLAN NOTE
Presents with generalized weakness, failure to thrive, poor appetite, episodes of loose stools.  Denies fever, chills, sweats, constitutional symptoms  Likely multifactorial - chemotherapy, and/V/D, poor oral intake  Supportive cares    Plan:  Encourage oral intake  PT recommended level II rehab.  Patient is amenable.  CM aware.  Disposition pending placement

## 2024-12-28 NOTE — PLAN OF CARE
Problem: PHYSICAL THERAPY ADULT  Goal: Performs mobility at highest level of function for planned discharge setting.  See evaluation for individualized goals.  Description: Treatment/Interventions: Functional transfer training, LE strengthening/ROM, Therapeutic exercise, Elevations, Endurance training, Patient/family training, Equipment eval/education, Bed mobility, Gait training, Compensatory technique education, Spoke to MD, Spoke to nursing          See flowsheet documentation for full assessment, interventions and recommendations.  Note: Prognosis: Fair  Problem List: Decreased strength, Decreased endurance, Impaired balance, Decreased mobility, Impaired judgement, Decreased safety awareness, Obesity, Decreased skin integrity, Pain  Assessment: Pt is 79 y.o. male seen for a PT evaluation s/p admit to [facility] on 12/26/2024. Pt presenting w/ failure to thrive, poor appetite + loose stools, admitted w/ principal dx of generalized weakness. Please see above for other active problem list / PMH. PT now consulted to assess functional mobility and needs for safe d/c planning. Prior to admission, pt was I w/ functional t/f from surface<>electric scooter, able to ambulate short distances via furniture cruising; lives w/ son in a house w/ stairs. Currently pt requires S-ModA for bed skills; ModA for functional transfers; ModA for limited ambulation w/ RW. Pt presents functioning below baseline and w/ overall mobility deficits 2* to: decreased LE strength; generalized weakness/deconditioning; decreased endurance; impaired balance; gait deviations; pain; fatigue; impaired safety and judgement; bed/chair alarms; multiple lines. These impairments place pt at risk for falls. Pt will continue to benefit from skilled PT interventions to address stated impairments; to maximize functional potential; for ongoing pt/ family training; and DME needs. PT is currently recommending rehab. Pt/family agreeable to plan and  goals as stated on evaluation.  Barriers to Discharge: Decreased caregiver support, Inaccessible home environment     Rehab Resource Intensity Level, PT: II (Moderate Resource Intensity)    See flowsheet documentation for full assessment.

## 2024-12-28 NOTE — ASSESSMENT & PLAN NOTE
Wt Readings from Last 3 Encounters:   12/26/24 100 kg (220 lb 14.4 oz)   12/13/24 103 kg (227 lb)   12/12/24 103 kg (227 lb)     Home diuretic: PO Lasix 40mg QD - hold for now  Continue metoprolol, losartan, Jardiance  Gentle IVF with NS for now  Low-salt diet  Monitor I/O, daily weights

## 2024-12-28 NOTE — ASSESSMENT & PLAN NOTE
Baseline- close to wheelchair bounded- minimal walk.  Fall precautions  PT recommended level II rehab.  Patient is amenable.  CM aware.  Disposition pending placement

## 2024-12-28 NOTE — ASSESSMENT & PLAN NOTE
Patient reports right ankle pain  No obvious erythema swelling  X-ray reveals some sclerosis of calcaneus possible stress injury  CRP 79.1, uric acid 11.5.  Possible gout  Podiatry following, appreciate recommendations   Wounds do not appear to be acutely infected.  Agree with prednisone for acute gouty arthropathy.  No surgical plans  Supportive cares

## 2024-12-28 NOTE — ASSESSMENT & PLAN NOTE
Likely multifactorial with dehydration contributing  Improved with IV fluids and  PO intake  Continue to monitor

## 2024-12-28 NOTE — ASSESSMENT & PLAN NOTE
History of extensive peripheral arterial disease  Patient with gangrene of right toe  Continue aspirin, Plavix, atorvastatin, pentoxifylline  Outpatient vascular surgery follow-up

## 2024-12-28 NOTE — ASSESSMENT & PLAN NOTE
With multiple electrolyte imbalances  Noted to be hypokalemic, hypophosphatemic, hypomagnesemia, and hyponatremia  Replete and monitor

## 2024-12-28 NOTE — ASSESSMENT & PLAN NOTE
Patient with episodes of diarrhea since receiving chemotherapy last week.  He also reports some nausea and vomiting.  Poor PO intake  Likely chemotherapy associated.  Improving  C. difficile and stool enteric panel negative  Supportive cares  Replete electrolytes    Plan:  Imodium as needed

## 2024-12-28 NOTE — QUICK NOTE
Left message for patient's son, Rodrigue, updating him on plan of care as well as patient being amenable for rehab placement as per PT recommendations.

## 2024-12-28 NOTE — ASSESSMENT & PLAN NOTE
History of prostate cancer follows with oncology receiving chemotherapy (last on 12/12)  Follows with Dr. Hernandez  Current regimen is Cabazitaxel, Neulasta, prednisone, Lupron, denosumab  Outpatient oncology inputs noted  Outpatient follow-up

## 2024-12-28 NOTE — PLAN OF CARE
Problem: Prexisting or High Potential for Compromised Skin Integrity  Goal: Skin integrity is maintained or improved  Description: INTERVENTIONS:  - Identify patients at risk for skin breakdown  - Assess and monitor skin integrity  - Assess and monitor nutrition and hydration status  - Monitor labs   - Assess for incontinence   - Turn and reposition patient  - Assist with mobility/ambulation  - Relieve pressure over bony prominences  - Avoid friction and shearing  - Provide appropriate hygiene as needed including keeping skin clean and dry  - Evaluate need for skin moisturizer/barrier cream  - Collaborate with interdisciplinary team   - Patient/family teaching  - Consider wound care consult   Outcome: Progressing     Problem: Nutrition/Hydration-ADULT  Goal: Nutrient/Hydration intake appropriate for improving, restoring or maintaining nutritional needs  Description: Monitor and assess patient's nutrition/hydration status for malnutrition. Collaborate with interdisciplinary team and initiate plan and interventions as ordered.  Monitor patient's weight and dietary intake as ordered or per policy. Utilize nutrition screening tool and intervene as necessary. Determine patient's food preferences and provide high-protein, high-caloric foods as appropriate.     INTERVENTIONS:  - Monitor oral intake, urinary output, labs, and treatment plans  - Assess nutrition and hydration status and recommend course of action  - Evaluate amount of meals eaten  - Assist patient with eating if necessary   - Allow adequate time for meals  - Recommend/ encourage appropriate diets, oral nutritional supplements, and vitamin/mineral supplements  - Order, calculate, and assess calorie counts as needed  - Recommend, monitor, and adjust tube feedings and TPN/PPN based on assessed needs  - Assess need for intravenous fluids  - Provide specific nutrition/hydration education as appropriate  - Include patient/family/caregiver in decisions related to  nutrition  Outcome: Progressing     Problem: PAIN - ADULT  Goal: Verbalizes/displays adequate comfort level or baseline comfort level  Description: Interventions:  - Encourage patient to monitor pain and request assistance  - Assess pain using appropriate pain scale  - Administer analgesics based on type and severity of pain and evaluate response  - Implement non-pharmacological measures as appropriate and evaluate response  - Consider cultural and social influences on pain and pain management  - Notify physician/advanced practitioner if interventions unsuccessful or patient reports new pain  Outcome: Progressing     Problem: INFECTION - ADULT  Goal: Absence or prevention of progression during hospitalization  Description: INTERVENTIONS:  - Assess and monitor for signs and symptoms of infection  - Monitor lab/diagnostic results  - Monitor all insertion sites, i.e. indwelling lines, tubes, and drains  - Monitor endotracheal if appropriate and nasal secretions for changes in amount and color  - [place] appropriate cooling/warming therapies per order  - Administer medications as ordered  - Instruct and encourage patient and family to use good hand hygiene technique  - Identify and instruct in appropriate isolation precautions for identified infection/condition  Outcome: Progressing  Goal: Absence of fever/infection during neutropenic period  Description: INTERVENTIONS:  - Monitor WBC    Outcome: Progressing     Problem: SAFETY ADULT  Goal: Patient will remain free of falls  Description: INTERVENTIONS:  - Educate patient/family on patient safety including physical limitations  - Instruct patient to call for assistance with activity   - Consult OT/PT to assist with strengthening/mobility   - Keep Call bell within reach  - Keep bed low and locked with side rails adjusted as appropriate  - Keep care items and personal belongings within reach  - Initiate and maintain comfort rounds  - Make Fall Risk Sign visible to  staff  - Apply yellow socks and bracelet for high fall risk patients  - Consider moving patient to room near nurses station  Outcome: Progressing  Goal: Maintain or return to baseline ADL function  Description: INTERVENTIONS:  -  Assess patient's ability to carry out ADLs; assess patient's baseline for ADL function and identify physical deficits which impact ability to perform ADLs (bathing, care of mouth/teeth, toileting, grooming, dressing, etc.)  - Assess/evaluate cause of self-care deficits   - Assess range of motion  - Assess patient's mobility; develop plan if impaired  - Assess patient's need for assistive devices and provide as appropriate  - Encourage maximum independence but intervene and supervise when necessary  - Involve family in performance of ADLs  - Assess for home care needs following discharge   - Consider OT consult to assist with ADL evaluation and planning for discharge  - Provide patient education as appropriate  Outcome: Progressing  Goal: Maintains/Returns to pre admission functional level  Description: INTERVENTIONS:  - Perform AM-PAC 6 Click Basic Mobility/ Daily Activity assessment daily.  - Set and communicate daily mobility goal to care team and patient/family/caregiver.   - Collaborate with rehabilitation services on mobility goals if consulted  - Out of bed for toileting  - Record patient progress and toleration of activity level   Outcome: Progressing     Problem: DISCHARGE PLANNING  Goal: Discharge to home or other facility with appropriate resources  Description: INTERVENTIONS:  - Identify barriers to discharge w/patient and caregiver  - Arrange for needed discharge resources and transportation as appropriate  - Identify discharge learning needs (meds, wound care, etc.)  - Arrange for interpretive services to assist at discharge as needed  - Refer to Case Management Department for coordinating discharge planning if the patient needs post-hospital services based on  physician/advanced practitioner order or complex needs related to functional status, cognitive ability, or social support system  Outcome: Progressing     Problem: Knowledge Deficit  Goal: Patient/family/caregiver demonstrates understanding of disease process, treatment plan, medications, and discharge instructions  Description: Complete learning assessment and assess knowledge base.  Interventions:  - Provide teaching at level of understanding  - Provide teaching via preferred learning methods  Outcome: Progressing     Problem: GASTROINTESTINAL - ADULT  Goal: Minimal or absence of nausea and/or vomiting  Description: INTERVENTIONS:  - Administer IV fluids if ordered to ensure adequate hydration  - Maintain NPO status until nausea and vomiting are resolved  - Nasogastric tube if ordered  - Administer ordered antiemetic medications as needed  - Provide nonpharmacologic comfort measures as appropriate  - Advance diet as tolerated, if ordered  - Consider nutrition services referral to assist patient with adequate nutrition and appropriate food choices  Outcome: Progressing  Goal: Maintains or returns to baseline bowel function  Description: INTERVENTIONS:  - Assess bowel function  - Encourage oral fluids to ensure adequate hydration  - Administer IV fluids if ordered to ensure adequate hydration  - Administer ordered medications as needed  - Encourage mobilization and activity  - Consider nutritional services referral to assist patient with adequate nutrition and appropriate food choices  Outcome: Progressing

## 2024-12-28 NOTE — PROGRESS NOTES
Progress Note - Hospitalist   Name: Royce Rene 79 y.o. male I MRN: [id number]  Unit/Bed#: S -01 I Date of Admission: 12/26/2024   Date of Service: 12/28/2024 I Hospital Day: 2    Assessment & Plan  Generalized weakness  Presents with generalized weakness, failure to thrive, poor appetite, episodes of loose stools.  Denies fever, chills, sweats, constitutional symptoms  Likely multifactorial - chemotherapy, and/V/D, poor oral intake  Supportive cares    Plan:  Encourage oral intake  PT recommended level II rehab.  Patient is amenable.  CM aware.  Disposition pending placement  Diarrhea  Patient with episodes of diarrhea since receiving chemotherapy last week.  He also reports some nausea and vomiting.  Poor PO intake  Likely chemotherapy associated.  Improving  C. difficile and stool enteric panel negative  Supportive cares  Replete electrolytes    Plan:  Imodium as needed  Right ankle pain  Patient reports right ankle pain  No obvious erythema swelling  X-ray reveals some sclerosis of calcaneus possible stress injury  CRP 79.1, uric acid 11.5.  Possible gout  Podiatry following, appreciate recommendations   Wounds do not appear to be acutely infected.  Agree with prednisone for acute gouty arthropathy.  No surgical plans  Supportive cares    Chronic combined systolic and diastolic CHF (congestive heart failure) (HCC)  Wt Readings from Last 3 Encounters:   12/26/24 100 kg (220 lb 14.4 oz)   12/13/24 103 kg (227 lb)   12/12/24 103 kg (227 lb)     Home diuretic: PO Lasix 40mg QD - hold for now  Continue metoprolol, losartan, Jardiance  Gentle IVF with NS for now  Low-salt diet  Monitor I/O, daily weights  Prostate cancer (HCC)  History of prostate cancer follows with oncology receiving chemotherapy (last on 12/12)  Follows with Dr. Hernandez  Current regimen is Cabazitaxel, Neulasta, prednisone, Lupron, denosumab  Outpatient oncology inputs noted  Outpatient follow-up  CAD (coronary artery disease)  Continue aspirin,  metoprolol, atorvastatin  PAD (peripheral artery disease) (HCC)  History of extensive peripheral arterial disease  Patient with gangrene of right toe  Continue aspirin, Plavix, atorvastatin, pentoxifylline  Outpatient vascular surgery follow-up  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)  See plan under prostate cancer  Cancer-related pain  Oxycodone as needed  Supportive cares  Currently well controlled  Ambulatory dysfunction  Baseline- close to wheelchair bounded- minimal walk.  Fall precautions  PT recommended level II rehab.  Patient is amenable.  CM aware.  Disposition pending placement  Electrolyte imbalance  With multiple electrolyte imbalances  Noted to be hypokalemic, hypophosphatemic, hypomagnesemia, and hyponatremia  Replete and monitor    Class 1 obesity due to excess calories with body mass index (BMI) of 33.0 to 33.9 in adult  Therapeutic lifestyle modification encouraged  Dry gangrene (HCC)  Dry gangrene right big toe with history of peripheral disease  Vascular surgery plans for outpatient follow-up noted    Hyponatremia  Likely multifactorial with dehydration contributing  Improved with IV fluids and  PO intake  Continue to monitor  Ulcer of left foot, limited to breakdown of skin (HCC)  Pic under media.  Low suspicion for active infection  Per podiatry, no surgical plans    VTE Pharmacologic Prophylaxis: VTE Score: 8 High Risk (Score >/= 5) - Pharmacological DVT Prophylaxis Ordered: enoxaparin (Lovenox). Sequential Compression Devices Ordered.    Mobility:   Basic Mobility Inpatient Raw Score: 13  JH-HLM Goal: 4: Move to chair/commode  JH-HLM Achieved: 2: Bed activities/Dependent transfer  JH-HLM Goal NOT achieved. Continue with multidisciplinary rounding and encourage appropriate mobility to improve upon JH-HLM goals.    Patient Centered Rounds: I performed bedside rounds with nursing staff today.   Discussions with Specialists or Other Care Team Provider: Podiatry    Education and Discussions  with Family / Patient: Attempted to update  (son) via phone. Left voicemail.     Current Length of Stay: 2 day(s)  Current Patient Status: Inpatient   Certification Statement: The patient will continue to require additional inpatient hospital stay due to rehab placement  Discharge Plan: Anticipate discharge in 24-48 hrs to rehab facility.    Code Status: Level 1 - Full Code    Subjective   Patient was seen and examined at bedside.  Last night, he reports not eating dinner because he was not hungry.  This morning, he ate breakfast and feels as if he has more energy.  He denies any chest pain, difficulty breathing, abdominal pain.  He last had a bowel movement yesterday, which was loose.  Right ankle pain is rated 3/10 in severity, improved from yesterday.    Objective :  Temp:  [97.8 °F (36.6 °C)-99.4 °F (37.4 °C)] 99.4 °F (37.4 °C)  HR:  [] 104  BP: (103-160)/(65-99) 127/99  Resp:  [16-20] 16  SpO2:  [92 %-94 %] 92 %  O2 Device: None (Room air)    Body mass index is 33.59 kg/m².     Input and Output Summary (last 24 hours):     Intake/Output Summary (Last 24 hours) at 12/28/2024 1342  Last data filed at 12/27/2024 2225  Gross per 24 hour   Intake --   Output 400 ml   Net -400 ml       Physical Exam  Constitutional:       General: He is not in acute distress.     Appearance: Normal appearance. He is obese. He is ill-appearing. He is not toxic-appearing.   HENT:      Head: Normocephalic and atraumatic.      Mouth/Throat:      Mouth: Mucous membranes are moist.   Eyes:      Extraocular Movements: Extraocular movements intact.   Cardiovascular:      Rate and Rhythm: Regular rhythm. Tachycardia present.   Pulmonary:      Effort: No respiratory distress.      Breath sounds: No wheezing, rhonchi or rales.   Abdominal:      General: Bowel sounds are normal. There is no distension.      Palpations: Abdomen is soft.      Tenderness: There is no abdominal tenderness. There is no guarding or rebound.    Musculoskeletal:         General: Tenderness (right foot) present. No swelling.      Cervical back: Normal range of motion and neck supple.   Skin:     General: Skin is warm.      Capillary Refill: Capillary refill takes less than 2 seconds.      Findings: Lesion (RLE - see media) present.   Neurological:      General: No focal deficit present.      Mental Status: He is alert and oriented to person, place, and time.       Lines/Drains:      Central Line:  Goal for removal:  Chemo port               Lab Results: I have reviewed the following results:   Results from last 7 days   Lab Units 12/28/24  0528 12/27/24  0608   WBC Thousand/uL 10.24* 11.90*   HEMOGLOBIN g/dL 8.9* 9.3*   HEMATOCRIT % 27.6* 29.2*   PLATELETS Thousands/uL 313 281   SEGS PCT %  --  83*   LYMPHO PCT %  --  7*   MONO PCT %  --  9   EOS PCT %  --  0     Results from last 7 days   Lab Units 12/28/24  0528 12/27/24  0608   SODIUM mmol/L 141 137   POTASSIUM mmol/L 3.3* 3.3*   CHLORIDE mmol/L 111* 107   CO2 mmol/L 20* 20*   BUN mg/dL 10 13   CREATININE mg/dL 0.67 0.78   ANION GAP mmol/L 10 10   CALCIUM mg/dL 6.9* 7.0*   ALBUMIN g/dL  --  3.4*   TOTAL BILIRUBIN mg/dL  --  0.54   ALK PHOS U/L  --  70   ALT U/L  --  11   AST U/L  --  15   GLUCOSE RANDOM mg/dL 102 93                 Results from last 7 days   Lab Units 12/26/24  1155   LACTIC ACID mmol/L 1.8       Recent Cultures (last 7 days):   Results from last 7 days   Lab Units 12/27/24  1326   C DIFF TOXIN B BY PCR  Negative       Imaging Results Review: I reviewed radiology reports from this admission including: xray(s).  Other Study Results Review: EKG was reviewed.     Last 24 Hours Medication List:     Current Facility-Administered Medications:     acetaminophen (TYLENOL) tablet 650 mg, Q6H PRN    aspirin chewable tablet 81 mg, Daily    atorvastatin (LIPITOR) tablet 80 mg, Daily With Dinner    bismuth subsalicylate (PEPTO BISMOL) oral suspension 524 mg, Q6H PRN    clopidogrel (PLAVIX) tablet 75  mg, Daily    cyanocobalamin (VITAMIN B-12) tablet 1,000 mcg, Daily    Diclofenac Sodium (VOLTAREN) 1 % topical gel 2 g, 4x Daily    Empagliflozin (JARDIANCE) tablet 10 mg, QAM    enoxaparin (LOVENOX) subcutaneous injection 40 mg, Daily    ezetimibe (ZETIA) tablet 10 mg, Daily    famotidine (PEPCID) tablet 20 mg, Daily    ferrous sulfate tablet 325 mg, Daily    gabapentin (NEURONTIN) capsule 600 mg, BID    loperamide (IMODIUM) capsule 2 mg, 4x Daily PRN    losartan (COZAAR) tablet 12.5 mg, Daily    menthol-methyl salicylate (BENGAY) 10-15 % cream, 4x Daily PRN    methocarbamol (ROBAXIN) tablet 500 mg, TID    metoprolol succinate (TOPROL-XL) 24 hr tablet 25 mg, Daily    ondansetron (ZOFRAN) injection 4 mg, Q6H PRN    oxyCODONE (ROXICODONE) IR tablet 5 mg, BID PRN    pentoxifylline (TRENtal) ER tablet 400 mg, TID With Meals    potassium chloride (Klor-Con M20) CR tablet 20 mEq, Daily    potassium-sodium phosphateS (K-PHOS,PHOSPHA 250) -250 mg tablet 1 tablet, 4x Daily (with meals and at bedtime)    predniSONE tablet 5 mg, BID With Meals    Administrative Statements   Today, Patient Was Seen By: Na Solomon DO      **Please Note: This note may have been constructed using a voice recognition system.**

## 2024-12-29 PROBLEM — D50.9 IRON DEFICIENCY ANEMIA: Status: ACTIVE | Noted: 2024-12-29

## 2024-12-29 LAB
ANION GAP SERPL CALCULATED.3IONS-SCNC: 12 MMOL/L (ref 4–13)
BUN SERPL-MCNC: 11 MG/DL (ref 5–25)
CA-I BLD-SCNC: 1 MMOL/L (ref 1.12–1.32)
CALCIUM SERPL-MCNC: 7.6 MG/DL (ref 8.4–10.2)
CHLORIDE SERPL-SCNC: 109 MMOL/L (ref 96–108)
CO2 SERPL-SCNC: 21 MMOL/L (ref 21–32)
CREAT SERPL-MCNC: 0.69 MG/DL (ref 0.6–1.3)
ERYTHROCYTE [DISTWIDTH] IN BLOOD BY AUTOMATED COUNT: 14.9 % (ref 11.6–15.1)
GFR SERPL CREATININE-BSD FRML MDRD: 90 ML/MIN/1.73SQ M
GLUCOSE SERPL-MCNC: 99 MG/DL (ref 65–140)
HCT VFR BLD AUTO: 28 % (ref 36.5–49.3)
HGB BLD-MCNC: 9 G/DL (ref 12–17)
MAGNESIUM SERPL-MCNC: 1.9 MG/DL (ref 1.9–2.7)
MCH RBC QN AUTO: 30.2 PG (ref 26.8–34.3)
MCHC RBC AUTO-ENTMCNC: 32.1 G/DL (ref 31.4–37.4)
MCV RBC AUTO: 94 FL (ref 82–98)
PHOSPHATE SERPL-MCNC: 2.3 MG/DL (ref 2.3–4.1)
PLATELET # BLD AUTO: 341 THOUSANDS/UL (ref 149–390)
PMV BLD AUTO: 10.3 FL (ref 8.9–12.7)
POTASSIUM SERPL-SCNC: 3.3 MMOL/L (ref 3.5–5.3)
RBC # BLD AUTO: 2.98 MILLION/UL (ref 3.88–5.62)
SODIUM SERPL-SCNC: 142 MMOL/L (ref 135–147)
WBC # BLD AUTO: 12.71 THOUSAND/UL (ref 4.31–10.16)

## 2024-12-29 PROCEDURE — 83735 ASSAY OF MAGNESIUM: CPT

## 2024-12-29 PROCEDURE — 82330 ASSAY OF CALCIUM: CPT

## 2024-12-29 PROCEDURE — 99232 SBSQ HOSP IP/OBS MODERATE 35: CPT | Performed by: STUDENT IN AN ORGANIZED HEALTH CARE EDUCATION/TRAINING PROGRAM

## 2024-12-29 PROCEDURE — 85027 COMPLETE CBC AUTOMATED: CPT

## 2024-12-29 PROCEDURE — 84100 ASSAY OF PHOSPHORUS: CPT

## 2024-12-29 PROCEDURE — 80048 BASIC METABOLIC PNL TOTAL CA: CPT

## 2024-12-29 RX ORDER — FUROSEMIDE 40 MG/1
40 TABLET ORAL DAILY
Status: DISCONTINUED | OUTPATIENT
Start: 2024-12-30 | End: 2025-01-01 | Stop reason: HOSPADM

## 2024-12-29 RX ORDER — MAGNESIUM SULFATE HEPTAHYDRATE 40 MG/ML
2 INJECTION, SOLUTION INTRAVENOUS ONCE
Status: COMPLETED | OUTPATIENT
Start: 2024-12-29 | End: 2024-12-29

## 2024-12-29 RX ORDER — CALCIUM GLUCONATE 20 MG/ML
2 INJECTION, SOLUTION INTRAVENOUS ONCE
Status: COMPLETED | OUTPATIENT
Start: 2024-12-29 | End: 2024-12-29

## 2024-12-29 RX ORDER — POTASSIUM CHLORIDE 1500 MG/1
20 TABLET, EXTENDED RELEASE ORAL ONCE
Status: COMPLETED | OUTPATIENT
Start: 2024-12-29 | End: 2024-12-29

## 2024-12-29 RX ADMIN — ASPIRIN 81 MG CHEWABLE TABLET 81 MG: 81 TABLET CHEWABLE at 09:16

## 2024-12-29 RX ADMIN — METHOCARBAMOL TABLETS 500 MG: 500 TABLET, COATED ORAL at 22:05

## 2024-12-29 RX ADMIN — GABAPENTIN 600 MG: 300 CAPSULE ORAL at 17:58

## 2024-12-29 RX ADMIN — METOPROLOL SUCCINATE 25 MG: 25 TABLET, EXTENDED RELEASE ORAL at 09:16

## 2024-12-29 RX ADMIN — PREDNISONE 5 MG: 10 TABLET ORAL at 16:05

## 2024-12-29 RX ADMIN — FERROUS SULFATE TAB 325 MG (65 MG ELEMENTAL FE) 325 MG: 325 (65 FE) TAB at 09:16

## 2024-12-29 RX ADMIN — ENOXAPARIN SODIUM 40 MG: 40 INJECTION SUBCUTANEOUS at 09:15

## 2024-12-29 RX ADMIN — PREDNISONE 5 MG: 10 TABLET ORAL at 09:16

## 2024-12-29 RX ADMIN — FAMOTIDINE 20 MG: 20 TABLET, FILM COATED ORAL at 09:16

## 2024-12-29 RX ADMIN — DICLOFENAC SODIUM 2 G: 10 GEL TOPICAL at 22:05

## 2024-12-29 RX ADMIN — DICLOFENAC SODIUM 2 G: 10 GEL TOPICAL at 09:18

## 2024-12-29 RX ADMIN — EMPAGLIFLOZIN 10 MG: 10 TABLET, FILM COATED ORAL at 09:16

## 2024-12-29 RX ADMIN — DICLOFENAC SODIUM 2 G: 10 GEL TOPICAL at 11:36

## 2024-12-29 RX ADMIN — ATORVASTATIN CALCIUM 80 MG: 40 TABLET, FILM COATED ORAL at 16:05

## 2024-12-29 RX ADMIN — CLOPIDOGREL BISULFATE 75 MG: 75 TABLET ORAL at 09:18

## 2024-12-29 RX ADMIN — CALCIUM GLUCONATE 2 G: 20 INJECTION, SOLUTION INTRAVENOUS at 09:42

## 2024-12-29 RX ADMIN — LOSARTAN POTASSIUM 12.5 MG: 25 TABLET, FILM COATED ORAL at 09:16

## 2024-12-29 RX ADMIN — METHOCARBAMOL TABLETS 500 MG: 500 TABLET, COATED ORAL at 16:05

## 2024-12-29 RX ADMIN — PENTOXIFYLLINE 400 MG: 400 TABLET, EXTENDED RELEASE ORAL at 15:17

## 2024-12-29 RX ADMIN — LOPERAMIDE HYDROCHLORIDE 2 MG: 2 CAPSULE ORAL at 15:17

## 2024-12-29 RX ADMIN — METHOCARBAMOL TABLETS 500 MG: 500 TABLET, COATED ORAL at 09:16

## 2024-12-29 RX ADMIN — DICLOFENAC SODIUM 2 G: 10 GEL TOPICAL at 17:58

## 2024-12-29 RX ADMIN — PENTOXIFYLLINE 400 MG: 400 TABLET, EXTENDED RELEASE ORAL at 09:16

## 2024-12-29 RX ADMIN — EZETIMIBE 10 MG: 10 TABLET ORAL at 09:17

## 2024-12-29 RX ADMIN — DIBASIC SODIUM PHOSPHATE, MONOBASIC POTASSIUM PHOSPHATE AND MONOBASIC SODIUM PHOSPHATE 1 TABLET: 852; 155; 130 TABLET ORAL at 09:16

## 2024-12-29 RX ADMIN — MAGNESIUM SULFATE HEPTAHYDRATE 2 G: 40 INJECTION, SOLUTION INTRAVENOUS at 10:49

## 2024-12-29 RX ADMIN — POTASSIUM CHLORIDE 20 MEQ: 1500 TABLET, EXTENDED RELEASE ORAL at 09:16

## 2024-12-29 RX ADMIN — POTASSIUM CHLORIDE 20 MEQ: 1500 TABLET, EXTENDED RELEASE ORAL at 07:05

## 2024-12-29 RX ADMIN — GABAPENTIN 600 MG: 300 CAPSULE ORAL at 09:15

## 2024-12-29 NOTE — INCIDENTAL FINDINGS
The following findings require follow up:  Radiographic finding   Finding:   XR foot 3+ views RIGHT: No x-ray evidence of osteomyelitis at this time., Some sclerosis of the calcaneus is identified of indeterminate etiology. This would be an unusual location for metastatic disease. Subtle stress injury is possible    Follow up required: No, this was addressed during hospital stay    Incidental finding results were discussed with the Patient by Na Solomon DO on 12/29/24.   They expressed understanding and all questions answered.

## 2024-12-29 NOTE — ASSESSMENT & PLAN NOTE
Na 132 on arrival.  Hypovolemic hyponatremia.  Resolved  Likely multifactorial with dehydration contributing  Improved with IV fluids and PO intake  Continue to monitor

## 2024-12-29 NOTE — ASSESSMENT & PLAN NOTE
History of extensive peripheral arterial disease  Patient with gangrene of right toe  Continue aspirin, Plavix, atorvastatin, pentoxifylline  Outpatient vascular surgery follow-up.  Plan for intervention on 1/2/2025

## 2024-12-29 NOTE — PLAN OF CARE
Problem: Prexisting or High Potential for Compromised Skin Integrity  Goal: Skin integrity is maintained or improved  Description: INTERVENTIONS:  - Identify patients at risk for skin breakdown  - Assess and monitor skin integrity  - Assess and monitor nutrition and hydration status  - Monitor labs   - Assess for incontinence   - Turn and reposition patient  - Assist with mobility/ambulation  - Relieve pressure over bony prominences  - Avoid friction and shearing  - Provide appropriate hygiene as needed including keeping skin clean and dry  - Evaluate need for skin moisturizer/barrier cream  - Collaborate with interdisciplinary team   - Patient/family teaching  - Consider wound care consult   Outcome: Progressing     Problem: Nutrition/Hydration-ADULT  Goal: Nutrient/Hydration intake appropriate for improving, restoring or maintaining nutritional needs  Description: Monitor and assess patient's nutrition/hydration status for malnutrition. Collaborate with interdisciplinary team and initiate plan and interventions as ordered.  Monitor patient's weight and dietary intake as ordered or per policy. Utilize nutrition screening tool and intervene as necessary. Determine patient's food preferences and provide high-protein, high-caloric foods as appropriate.     INTERVENTIONS:  - Monitor oral intake, urinary output, labs, and treatment plans  - Assess nutrition and hydration status and recommend course of action  - Evaluate amount of meals eaten  - Assist patient with eating if necessary   - Allow adequate time for meals  - Recommend/ encourage appropriate diets, oral nutritional supplements, and vitamin/mineral supplements  - Order, calculate, and assess calorie counts as needed  - Recommend, monitor, and adjust tube feedings and TPN/PPN based on assessed needs  - Assess need for intravenous fluids  - Provide specific nutrition/hydration education as appropriate  - Include patient/family/caregiver in decisions related to  nutrition  Outcome: Progressing     Problem: PAIN - ADULT  Goal: Verbalizes/displays adequate comfort level or baseline comfort level  Description: Interventions:  - Encourage patient to monitor pain and request assistance  - Assess pain using appropriate pain scale  - Administer analgesics based on type and severity of pain and evaluate response  - Implement non-pharmacological measures as appropriate and evaluate response  - Consider cultural and social influences on pain and pain management  - Notify physician/advanced practitioner if interventions unsuccessful or patient reports new pain  Outcome: Progressing     Problem: INFECTION - ADULT  Goal: Absence or prevention of progression during hospitalization  Description: INTERVENTIONS:  - Assess and monitor for signs and symptoms of infection  - Monitor lab/diagnostic results  - Monitor all insertion sites, i.e. indwelling lines, tubes, and drains  - Monitor endotracheal if appropriate and nasal secretions for changes in amount and color  - [place] appropriate cooling/warming therapies per order  - Administer medications as ordered  - Instruct and encourage patient and family to use good hand hygiene technique  - Identify and instruct in appropriate isolation precautions for identified infection/condition  Outcome: Progressing  Goal: Absence of fever/infection during neutropenic period  Description: INTERVENTIONS:  - Monitor WBC    Outcome: Progressing     Problem: SAFETY ADULT  Goal: Patient will remain free of falls  Description: INTERVENTIONS:  - Educate patient/family on patient safety including physical limitations  - Instruct patient to call for assistance with activity   - Consult OT/PT to assist with strengthening/mobility   - Keep Call bell within reach  - Keep bed low and locked with side rails adjusted as appropriate  - Keep care items and personal belongings within reach  - Initiate and maintain comfort rounds  - Make Fall Risk Sign visible to  staff  - Apply yellow socks and bracelet for high fall risk patients  - Consider moving patient to room near nurses station  Outcome: Progressing  Goal: Maintain or return to baseline ADL function  Description: INTERVENTIONS:  -  Assess patient's ability to carry out ADLs; assess patient's baseline for ADL function and identify physical deficits which impact ability to perform ADLs (bathing, care of mouth/teeth, toileting, grooming, dressing, etc.)  - Assess/evaluate cause of self-care deficits   - Assess range of motion  - Assess patient's mobility; develop plan if impaired  - Assess patient's need for assistive devices and provide as appropriate  - Encourage maximum independence but intervene and supervise when necessary  - Involve family in performance of ADLs  - Assess for home care needs following discharge   - Consider OT consult to assist with ADL evaluation and planning for discharge  - Provide patient education as appropriate  Outcome: Progressing  Goal: Maintains/Returns to pre admission functional level  Description: INTERVENTIONS:  - Perform AM-PAC 6 Click Basic Mobility/ Daily Activity assessment daily.  - Set and communicate daily mobility goal to care team and patient/family/caregiver.   - Collaborate with rehabilitation services on mobility goals if consulted  - Out of bed for toileting  - Record patient progress and toleration of activity level   Outcome: Progressing     Problem: GASTROINTESTINAL - ADULT  Goal: Minimal or absence of nausea and/or vomiting  Description: INTERVENTIONS:  - Administer IV fluids if ordered to ensure adequate hydration  - Maintain NPO status until nausea and vomiting are resolved  - Nasogastric tube if ordered  - Administer ordered antiemetic medications as needed  - Provide nonpharmacologic comfort measures as appropriate  - Advance diet as tolerated, if ordered  - Consider nutrition services referral to assist patient with adequate nutrition and appropriate food  choices  Outcome: Progressing  Goal: Maintains or returns to baseline bowel function  Description: INTERVENTIONS:  - Assess bowel function  - Encourage oral fluids to ensure adequate hydration  - Administer IV fluids if ordered to ensure adequate hydration  - Administer ordered medications as needed  - Encourage mobilization and activity  - Consider nutritional services referral to assist patient with adequate nutrition and appropriate food choices  Outcome: Progressing

## 2024-12-29 NOTE — ASSESSMENT & PLAN NOTE
Presents with generalized weakness, failure to thrive, poor appetite, episodes of loose stools.  Denies fever, chills, sweats, constitutional symptoms  Likely multifactorial - chemotherapy, and V/D, poor oral intake  Supportive cares    Plan:  Medically cleared for discharge.  Pending rehab placement

## 2024-12-29 NOTE — PLAN OF CARE
Problem: Prexisting or High Potential for Compromised Skin Integrity  Goal: Skin integrity is maintained or improved  Description: INTERVENTIONS:  - Identify patients at risk for skin breakdown  - Assess and monitor skin integrity  - Assess and monitor nutrition and hydration status  - Monitor labs   - Assess for incontinence   - Turn and reposition patient  - Assist with mobility/ambulation  - Relieve pressure over bony prominences  - Avoid friction and shearing  - Provide appropriate hygiene as needed including keeping skin clean and dry  - Evaluate need for skin moisturizer/barrier cream  - Collaborate with interdisciplinary team   - Patient/family teaching  - Consider wound care consult   Outcome: Progressing     Problem: Nutrition/Hydration-ADULT  Goal: Nutrient/Hydration intake appropriate for improving, restoring or maintaining nutritional needs  Description: Monitor and assess patient's nutrition/hydration status for malnutrition. Collaborate with interdisciplinary team and initiate plan and interventions as ordered.  Monitor patient's weight and dietary intake as ordered or per policy. Utilize nutrition screening tool and intervene as necessary. Determine patient's food preferences and provide high-protein, high-caloric foods as appropriate.     INTERVENTIONS:  - Monitor oral intake, urinary output, labs, and treatment plans  - Assess nutrition and hydration status and recommend course of action  - Evaluate amount of meals eaten  - Assist patient with eating if necessary   - Allow adequate time for meals  - Recommend/ encourage appropriate diets, oral nutritional supplements, and vitamin/mineral supplements  - Order, calculate, and assess calorie counts as needed  - Recommend, monitor, and adjust tube feedings and TPN/PPN based on assessed needs  - Assess need for intravenous fluids  - Provide specific nutrition/hydration education as appropriate  - Include patient/family/caregiver in decisions related to  nutrition  Outcome: Progressing     Problem: PAIN - ADULT  Goal: Verbalizes/displays adequate comfort level or baseline comfort level  Description: Interventions:  - Encourage patient to monitor pain and request assistance  - Assess pain using appropriate pain scale  - Administer analgesics based on type and severity of pain and evaluate response  - Implement non-pharmacological measures as appropriate and evaluate response  - Consider cultural and social influences on pain and pain management  - Notify physician/advanced practitioner if interventions unsuccessful or patient reports new pain  Outcome: Progressing     Problem: INFECTION - ADULT  Goal: Absence or prevention of progression during hospitalization  Description: INTERVENTIONS:  - Assess and monitor for signs and symptoms of infection  - Monitor lab/diagnostic results  - Monitor all insertion sites, i.e. indwelling lines, tubes, and drains  - Monitor endotracheal if appropriate and nasal secretions for changes in amount and color  - [place] appropriate cooling/warming therapies per order  - Administer medications as ordered  - Instruct and encourage patient and family to use good hand hygiene technique  - Identify and instruct in appropriate isolation precautions for identified infection/condition  Outcome: Progressing  Goal: Absence of fever/infection during neutropenic period  Description: INTERVENTIONS:  - Monitor WBC    Outcome: Progressing     Problem: SAFETY ADULT  Goal: Patient will remain free of falls  Description: INTERVENTIONS:  - Educate patient/family on patient safety including physical limitations  - Instruct patient to call for assistance with activity   - Consult OT/PT to assist with strengthening/mobility   - Keep Call bell within reach  - Keep bed low and locked with side rails adjusted as appropriate  - Keep care items and personal belongings within reach  - Initiate and maintain comfort rounds  - Make Fall Risk Sign visible to  staff  - Offer Toileting every 2 Hours, in advance of need  - Initiate/Maintain bed/chair alarm  - Obtain necessary fall risk management equipment  - Apply yellow socks and bracelet for high fall risk patients  - Consider moving patient to room near nurses station  Outcome: Progressing  Goal: Maintain or return to baseline ADL function  Description: INTERVENTIONS:  -  Assess patient's ability to carry out ADLs; assess patient's baseline for ADL function and identify physical deficits which impact ability to perform ADLs (bathing, care of mouth/teeth, toileting, grooming, dressing, etc.)  - Assess/evaluate cause of self-care deficits   - Assess range of motion  - Assess patient's mobility; develop plan if impaired  - Assess patient's need for assistive devices and provide as appropriate  - Encourage maximum independence but intervene and supervise when necessary  - Involve family in performance of ADLs  - Assess for home care needs following discharge   - Consider OT consult to assist with ADL evaluation and planning for discharge  - Provide patient education as appropriate  Outcome: Progressing  Goal: Maintains/Returns to pre admission functional level  Description: INTERVENTIONS:  - Perform AM-PAC 6 Click Basic Mobility/ Daily Activity assessment daily.  - Set and communicate daily mobility goal to care team and patient/family/caregiver.   - Collaborate with rehabilitation services on mobility goals if consulted  - Perform Range of Motion 3 times a day.  - Reposition patient every 2 hours.  - Dangle patient 3 times a day  - Stand patient 3 times a day  - Ambulate patient 3 times a day  - Out of bed to chair 3 times a day   - Out of bed for meals 3 times a day  - Out of bed for toileting  - Record patient progress and toleration of activity level   Outcome: Progressing     Problem: DISCHARGE PLANNING  Goal: Discharge to home or other facility with appropriate resources  Description: INTERVENTIONS:  - Identify  barriers to discharge w/patient and caregiver  - Arrange for needed discharge resources and transportation as appropriate  - Identify discharge learning needs (meds, wound care, etc.)  - Arrange for interpretive services to assist at discharge as needed  - Refer to Case Management Department for coordinating discharge planning if the patient needs post-hospital services based on physician/advanced practitioner order or complex needs related to functional status, cognitive ability, or social support system  Outcome: Progressing     Problem: Knowledge Deficit  Goal: Patient/family/caregiver demonstrates understanding of disease process, treatment plan, medications, and discharge instructions  Description: Complete learning assessment and assess knowledge base.  Interventions:  - Provide teaching at level of understanding  - Provide teaching via preferred learning methods  Outcome: Progressing     Problem: GASTROINTESTINAL - ADULT  Goal: Minimal or absence of nausea and/or vomiting  Description: INTERVENTIONS:  - Administer IV fluids if ordered to ensure adequate hydration  - Maintain NPO status until nausea and vomiting are resolved  - Nasogastric tube if ordered  - Administer ordered antiemetic medications as needed  - Provide nonpharmacologic comfort measures as appropriate  - Advance diet as tolerated, if ordered  - Consider nutrition services referral to assist patient with adequate nutrition and appropriate food choices  Outcome: Progressing  Goal: Maintains or returns to baseline bowel function  Description: INTERVENTIONS:  - Assess bowel function  - Encourage oral fluids to ensure adequate hydration  - Administer IV fluids if ordered to ensure adequate hydration  - Administer ordered medications as needed  - Encourage mobilization and activity  - Consider nutritional services referral to assist patient with adequate nutrition and appropriate food choices  Outcome: Progressing

## 2024-12-29 NOTE — DISCHARGE INSTR - AVS FIRST PAGE
Dear Royce Rene,     It was our pleasure to care for you here at [facility].  It is our hope that we were always able to exceed the expected standards for your care during your stay.  You were hospitalized due to generalized weakness.  You were cared for on the 4th floor South by Na Solomon DO under the service of Ayan Page,* with the [facility] who covers for your primary care physician (PCP), Anne Chandra MD, while you were hospitalized.  If you have any questions or concerns related to this hospitalization, you may contact us at .  For follow up as well as any medication refills, we recommend that you follow up with your primary care physician.  A registered nurse will reach out to you by phone within a few days after your discharge to answer any additional questions that you may have after going home.  However, at this time we provide for you here, the most important instructions / recommendations at discharge:     Notable Medication Adjustments -   Stop taking Vitamin B12 supplementation. Your Vitamin B12 level was above normal.  Stop taking Levofloxacin.  Your right foot does not appear infected at this time.  Testing Required after Discharge -   We recommend CBC (to monitor blood counts) and BMP (to monitor electrolytes) in 1 week with primary care physician  ** Please contact your PCP to request testing orders for any of the testing recommended here **  Important follow up information -   Follow-up with your primary care physician within 1 week  Other Instructions -   Return to the emergency department if you have worsening symptoms  Please review this entire after visit summary as additional general instructions including medication list, appointments, activity, diet, any pertinent wound care, and other additional recommendations from your care team that may be provided for you.      Sincerely,     Na  Juanito, DO

## 2024-12-29 NOTE — ASSESSMENT & PLAN NOTE
Lab Results   Component Value Date    RWLXIARU69 1,528 (H) 12/28/2024     Stop home Vitamin B12 supplementation

## 2024-12-29 NOTE — QUICK NOTE
Left message for patient's son, Rodrigue, over the phone regarding plan of care and disposition pending rehab placement.

## 2024-12-29 NOTE — ASSESSMENT & PLAN NOTE
Lab Results   Component Value Date    IRON 21 (L) 12/28/2024    FERRITIN 1,012 (H) 12/28/2024     Continue iron tablet 325 mg

## 2024-12-29 NOTE — PROGRESS NOTES
Progress Note - Hospitalist   Name: Royce Rene 79 y.o. male I MRN: [id number]  Unit/Bed#: S -01 I Date of Admission: 12/26/2024   Date of Service: 12/29/2024 I Hospital Day: 3    Assessment & Plan  Generalized weakness  Presents with generalized weakness, failure to thrive, poor appetite, episodes of loose stools.  Denies fever, chills, sweats, constitutional symptoms  Likely multifactorial - chemotherapy, and V/D, poor oral intake  Supportive cares    Plan:  Medically cleared for discharge.  Pending rehab placement  Diarrhea  Patient with episodes of diarrhea since receiving chemotherapy last week.  He also reports some nausea and vomiting.  Poor PO intake  Likely chemotherapy associated.  Improving  C. difficile and stool enteric panel negative  Supportive cares  Replete electrolytes    Plan:  Imodium as needed  Right ankle pain  Patient reports right ankle pain  No obvious erythema swelling  X-ray reveals some sclerosis of calcaneus possible stress injury  CRP 79.1, uric acid 11.5.  Possible gout  Podiatry following, appreciate recommendations   Wounds do not appear to be acutely infected.  Agree with prednisone for acute gouty arthropathy.  No surgical plans  Supportive cares    Chronic combined systolic and diastolic CHF (congestive heart failure) (HCC)  Wt Readings from Last 3 Encounters:   12/26/24 100 kg (220 lb 14.4 oz)   12/13/24 103 kg (227 lb)   12/12/24 103 kg (227 lb)     Home diuretic: PO Lasix 40mg QD - which was held during hospital stay.  Can resume  Continue metoprolol, losartan, Jardiance  Low-salt diet  Monitor I/O, daily weights  Prostate cancer (HCC)  History of prostate cancer follows with oncology receiving chemotherapy (last on 12/12)  Follows with Dr. Hernandez  Current regimen is Cabazitaxel, Neulasta, prednisone, Lupron, denosumab  Outpatient oncology inputs noted  Outpatient follow-up  CAD (coronary artery disease)  Continue aspirin, metoprolol, atorvastatin  PAD (peripheral artery  disease) (HCC)  History of extensive peripheral arterial disease  Patient with gangrene of right toe  Continue aspirin, Plavix, atorvastatin, pentoxifylline  Outpatient vascular surgery follow-up.  Plan for intervention on 1/2/2025  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)  See plan under prostate cancer  Cancer-related pain  Oxycodone as needed  Supportive cares  Currently well controlled  Ambulatory dysfunction  Baseline- close to wheelchair bounded- minimal walk.  Fall precautions  PT recommended level II rehab.  Patient is amenable.  CM aware.  Disposition pending placement  Electrolyte imbalance  Lab Results   Component Value Date    SODIUM 142 12/29/2024    K 3.3 (L) 12/29/2024    MG 1.9 12/29/2024    PHOS 2.3 12/29/2024    CAIONIZED 1.00 (L) 12/29/2024     With multiple electrolyte imbalances upon arrival   Replete and monitor    Hyponatremia  Na 132 on arrival.  Hypovolemic hyponatremia.  Resolved  Likely multifactorial with dehydration contributing  Improved with IV fluids and PO intake  Continue to monitor  Class 1 obesity due to excess calories with body mass index (BMI) of 33.0 to 33.9 in adult  Therapeutic lifestyle modification encouraged  Dry gangrene (HCC)  Dry gangrene right big toe with history of peripheral disease  Vascular surgery plans for outpatient follow-up noted    Ulcer of left foot, limited to breakdown of skin (HCC)  Pic under media.  Low suspicion for active infection  Per podiatry, no surgical plans  Iron deficiency anemia  Lab Results   Component Value Date    IRON 21 (L) 12/28/2024    FERRITIN 1,012 (H) 12/28/2024     Continue iron tablet 325 mg  Vitamin B12 deficiency  Lab Results   Component Value Date    CIXYVPJW15 1,528 (H) 12/28/2024     Stop home Vitamin B12 supplementation    VTE Pharmacologic Prophylaxis: VTE Score: 8 High Risk (Score >/= 5) - Pharmacological DVT Prophylaxis Ordered: enoxaparin (Lovenox). Sequential Compression Devices Ordered.    Mobility:   Basic  Mobility Inpatient Raw Score: 12  JH-HLM Goal: 4: Move to chair/commode  JH-HLM Achieved: 1: Laying in bed  JH-HLM Goal NOT achieved. Continue with multidisciplinary rounding and encourage appropriate mobility to improve upon JH-HLM goals.    Patient Centered Rounds: I performed bedside rounds with nursing staff today.   Discussions with Specialists or Other Care Team Provider: Podiatry    Education and Discussions with Family / Patient:  Will update family.     Current Length of Stay: 3 day(s)  Current Patient Status: Inpatient   Certification Statement: The patient will continue to require additional inpatient hospital stay due to pending rehab placement  Discharge Plan: Anticipate discharge later today or tomorrow to rehab facility.    Code Status: Level 1 - Full Code    Subjective   Patient was seen and examined at bedside.  He reports having multiple episodes of loose stools overnight.  He feels as if he has more energy today compared to yesterday.  He would like bedside commode because he cannot walk to bathroom.  He is eating well.  He denies any abdominal pain, nausea, vomiting.  His right leg pain is minimal at rest.  He is ready to go to rehab.    Objective :  Temp:  [98.8 °F (37.1 °C)-98.9 °F (37.2 °C)] 98.9 °F (37.2 °C)  HR:  [92] 92  BP: (112-118)/(60-94) 118/60  Resp:  [18-20] 20  SpO2:  [94 %-95 %] 95 %    Body mass index is 33.59 kg/m².     Input and Output Summary (last 24 hours):     Intake/Output Summary (Last 24 hours) at 12/29/2024 1154  Last data filed at 12/28/2024 2237  Gross per 24 hour   Intake --   Output 500 ml   Net -500 ml       Physical Exam  Constitutional:       General: He is not in acute distress.     Appearance: Normal appearance. He is obese. He is ill-appearing. He is not toxic-appearing.   HENT:      Head: Normocephalic and atraumatic.      Mouth/Throat:      Mouth: Mucous membranes are moist.   Eyes:      Extraocular Movements: Extraocular movements intact.   Cardiovascular:       Rate and Rhythm: Normal rate and regular rhythm.   Pulmonary:      Effort: No respiratory distress.      Breath sounds: No wheezing, rhonchi or rales.   Abdominal:      General: Bowel sounds are normal. There is no distension.      Palpations: Abdomen is soft.      Tenderness: There is no abdominal tenderness. There is no guarding or rebound.   Musculoskeletal:         General: Tenderness (right foot) present. No swelling.      Cervical back: Normal range of motion and neck supple.   Skin:     General: Skin is warm.      Capillary Refill: Capillary refill takes less than 2 seconds.      Findings: Lesion (RLE - see media) present.   Neurological:      General: No focal deficit present.      Mental Status: He is alert and oriented to person, place, and time.       Lines/Drains:    Central Line:  Goal for removal:  chemo port             Lab Results: I have reviewed the following results:   Results from last 7 days   Lab Units 12/29/24  0427 12/28/24  0528 12/27/24  0608   WBC Thousand/uL 12.71*   < > 11.90*   HEMOGLOBIN g/dL 9.0*   < > 9.3*   HEMATOCRIT % 28.0*   < > 29.2*   PLATELETS Thousands/uL 341   < > 281   SEGS PCT %  --   --  83*   LYMPHO PCT %  --   --  7*   MONO PCT %  --   --  9   EOS PCT %  --   --  0    < > = values in this interval not displayed.     Results from last 7 days   Lab Units 12/29/24  0427 12/28/24  0528 12/27/24  0608   SODIUM mmol/L 142   < > 137   POTASSIUM mmol/L 3.3*   < > 3.3*   CHLORIDE mmol/L 109*   < > 107   CO2 mmol/L 21   < > 20*   BUN mg/dL 11   < > 13   CREATININE mg/dL 0.69   < > 0.78   ANION GAP mmol/L 12   < > 10   CALCIUM mg/dL 7.6*   < > 7.0*   ALBUMIN g/dL  --   --  3.4*   TOTAL BILIRUBIN mg/dL  --   --  0.54   ALK PHOS U/L  --   --  70   ALT U/L  --   --  11   AST U/L  --   --  15   GLUCOSE RANDOM mg/dL 99   < > 93    < > = values in this interval not displayed.                 Results from last 7 days   Lab Units 12/26/24  1155   LACTIC ACID mmol/L 1.8       Recent  Cultures (last 7 days):   Results from last 7 days   Lab Units 12/27/24  1326   C DIFF TOXIN B BY PCR  Negative       Imaging Results Review: I reviewed radiology reports from this admission including: xray(s).  Other Study Results Review: EKG was reviewed.     Last 24 Hours Medication List:     Current Facility-Administered Medications:     acetaminophen (TYLENOL) tablet 650 mg, Q6H PRN    aspirin chewable tablet 81 mg, Daily    atorvastatin (LIPITOR) tablet 80 mg, Daily With Dinner    bismuth subsalicylate (PEPTO BISMOL) oral suspension 524 mg, Q6H PRN    clopidogrel (PLAVIX) tablet 75 mg, Daily    Diclofenac Sodium (VOLTAREN) 1 % topical gel 2 g, 4x Daily    Empagliflozin (JARDIANCE) tablet 10 mg, QAM    enoxaparin (LOVENOX) subcutaneous injection 40 mg, Daily    ezetimibe (ZETIA) tablet 10 mg, Daily    famotidine (PEPCID) tablet 20 mg, Daily    ferrous sulfate tablet 325 mg, Daily    [START ON 12/30/2024] furosemide (LASIX) tablet 40 mg, Daily    gabapentin (NEURONTIN) capsule 600 mg, BID    loperamide (IMODIUM) capsule 2 mg, 4x Daily PRN    losartan (COZAAR) tablet 12.5 mg, Daily    magnesium sulfate 2 g/50 mL IVPB (premix) 2 g, Once, Last Rate: 2 g (12/29/24 1049)    menthol-methyl salicylate (BENGAY) 10-15 % cream, 4x Daily PRN    methocarbamol (ROBAXIN) tablet 500 mg, TID    metoprolol succinate (TOPROL-XL) 24 hr tablet 25 mg, Daily    ondansetron (ZOFRAN) injection 4 mg, Q6H PRN    oxyCODONE (ROXICODONE) IR tablet 5 mg, BID PRN    pentoxifylline (TRENtal) ER tablet 400 mg, TID With Meals    potassium chloride (Klor-Con M20) CR tablet 20 mEq, Daily    predniSONE tablet 5 mg, BID With Meals    Administrative Statements   Today, Patient Was Seen By: Na Solomon DO      **Please Note: This note may have been constructed using a voice recognition system.**

## 2024-12-29 NOTE — QUICK NOTE
Received message from RN stating that patient has not been able to urinate.  Bladder scan showed > 556 mL.  Patient told RN that he does not want to be straight cathed and he wants to urinate on his own.    I spoke to the patient at bedside.  He states that he had a bad experience with Moser catheter in the past and does not want that.  I explained to him that straight catheterization is different from Moser catheter.  If he requires more than 2 straight catheterizations, then he will require Moser catheter as part of the urinary retention protocol.  He is amenable to being straight cathed at this time.

## 2024-12-29 NOTE — HOSPITAL COURSE
78 y/o M who presented with generalized weakness, nausea, vomiting, diarrhea, and poor oral intake after recent chemotherapy session.  He was treated conservatively with intravenous fluids and electrolyte repletion.  C. difficile and stool enteric panel was negative.  He also had right ankle pain and difficulty bearing weight for which podiatry was consulted.  They did not feel that wounds were acutely infected.  Patient was on levofloxacin prior to hospital admission, which was subsequently discontinued.  He had mild leukocytosis and elevated CRP and uric acid, which likely was due to gout.  X-ray of the right foot was negative for osteomyelitis.  PT recommended level II.  Plan for outpatient follow-up with primary care physician within 1 week.  We recommend repeat CBC and BMP in 1 week.  Stable for discharge to rehab facility.

## 2024-12-29 NOTE — ASSESSMENT & PLAN NOTE
Lab Results   Component Value Date    SODIUM 142 12/29/2024    K 3.3 (L) 12/29/2024    MG 1.9 12/29/2024    PHOS 2.3 12/29/2024    CAIONIZED 1.00 (L) 12/29/2024     With multiple electrolyte imbalances upon arrival   Replete and monitor

## 2024-12-29 NOTE — CASE MANAGEMENT
Case Management Discharge Planning Note    Patient name Royce Rene  Location S /S -01 MRN [id number]  : 1945 Date 2024       Current Admission Date: 2024  Current Admission Diagnosis:Generalized weakness   Patient Active Problem List    Diagnosis Date Noted Date Diagnosed    Generalized weakness 2024     Hyponatremia 2024     Diarrhea 2024     Right ankle pain 2024     Diabetic ulcer of toe of right foot associated with type 2 diabetes mellitus, with necrosis of bone (HCC) 2024     Vitamin B12 deficiency 2024     Mixed hyperlipidemia 2024     Exudative age-related macular degeneration, left eye, with active choroidal neovascularization (HCC) 2024     Facial lesion 2024     Dry gangrene (HCC) 2024     Ischemic pain of foot at rest 10/29/2024     Ulcer of right foot with fat layer exposed (HCC) 10/29/2024     Ulcer of left foot, limited to breakdown of skin (HCC) 10/29/2024     Osteomyelitis (HCC) 10/22/2024     Prevention of chemotherapy-induced neutropenia 2024     Frail elder // vulnerable adult 2024     Elevated liver enzymes 2024     Proctitis 2024     Class 1 obesity due to excess calories with body mass index (BMI) of 33.0 to 33.9 in adult 2023     Electrolyte imbalance 2023     Moderate vascular dementia (HCC) 2023     Ambulatory dysfunction 2022     Peripheral neuropathy 2022     Financial problems 2022     Cancer-related pain 05/10/2022     Palliative care patient 05/10/2022     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) 2022     Embolism and 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 (HCC) 2022     Depression, recurrent (HCC) 2022     Port-A-Cath in place 10/05/2021     PAD (peripheral artery disease) (HCC) 2021     Urinary retention 2021     CAD (coronary artery disease) 07/10/2021     Ischemic leg pain 2021      Prostate cancer (HCC) 07/07/2021     Ischemic cardiomyopathy 07/01/2021     Chronic combined systolic and diastolic CHF (congestive heart failure) (HCC) 06/22/2021     S/P AVR 06/22/2021     Anemia 06/22/2021       LOS (days): 3  Geometric Mean LOS (GMLOS) (days):   Days to GMLOS:     OBJECTIVE:  Risk of Unplanned Readmission Score: 37.55         Current admission status: Inpatient   Preferred Pharmacy:   [facility], [place], [place] - [address] [facility]  [address] [facility] [address]  Phone: [phone] Fax: [phone]    [facility] [place], [place] - [address] [facility]  [address]  [address]  [place] [address]  Phone: [phone] Fax: [phone]    [facility] [place] - [address]  [address]  [address]  [facility] [address]  Phone: [phone] Fax: [phone]    [place], [place] - [address] [facility]  [address] [facility] [address]  Phone: [phone] Fax: [phone]    [facility], [place], [place] - [address] [facility]  [address] [facility] [address]  Phone: [phone] Fax: [phone]    Primary Care Provider: Anne Chandra MD    Primary Insurance: [employer] MC REP  Secondary Insurance:     DISCHARGE DETAILS:    Discharge planning discussed with:: Patient  Freedom of Choice: Yes  Comments - Freedom of Choice: CM met with Pt to discuss the recommendation of SNF which he reported being agreeable to, and gave permission for a blanket SNF referral.       Requested Home Health Care         Is the patient interested in HHC at discharge?: No    DME Referral Provided  Referral made for DME?: No    Other Referral/Resources/Interventions Provided:  Interventions: Short Term Rehab  Referral Comments: CM made a blanket SNF referral for an accepting facility.         Treatment Team Recommendation: Short Term Rehab  Discharge Destination Plan:: Short Term Rehab

## 2024-12-29 NOTE — ASSESSMENT & PLAN NOTE
Wt Readings from Last 3 Encounters:   12/26/24 100 kg (220 lb 14.4 oz)   12/13/24 103 kg (227 lb)   12/12/24 103 kg (227 lb)     Home diuretic: PO Lasix 40mg QD - which was held during hospital stay.  Can resume  Continue metoprolol, losartan, Jardiance  Low-salt diet  Monitor I/O, daily weights

## 2024-12-30 LAB
ANION GAP SERPL CALCULATED.3IONS-SCNC: 10 MMOL/L (ref 4–13)
ATRIAL RATE: 112 BPM
ATRIAL RATE: 93 BPM
BUN SERPL-MCNC: 12 MG/DL (ref 5–25)
CALCIUM SERPL-MCNC: 7.9 MG/DL (ref 8.4–10.2)
CHLORIDE SERPL-SCNC: 108 MMOL/L (ref 96–108)
CO2 SERPL-SCNC: 22 MMOL/L (ref 21–32)
CREAT SERPL-MCNC: 0.68 MG/DL (ref 0.6–1.3)
GFR SERPL CREATININE-BSD FRML MDRD: 90 ML/MIN/1.73SQ M
GLUCOSE SERPL-MCNC: 97 MG/DL (ref 65–140)
P AXIS: 19 DEGREES
P AXIS: 49 DEGREES
POTASSIUM SERPL-SCNC: 3.7 MMOL/L (ref 3.5–5.3)
PR INTERVAL: 116 MS
PR INTERVAL: 122 MS
QRS AXIS: 126 DEGREES
QRS AXIS: 94 DEGREES
QRSD INTERVAL: 128 MS
QRSD INTERVAL: 136 MS
QT INTERVAL: 382 MS
QT INTERVAL: 428 MS
QTC INTERVAL: 521 MS
QTC INTERVAL: 532 MS
SODIUM SERPL-SCNC: 140 MMOL/L (ref 135–147)
T WAVE AXIS: -26 DEGREES
T WAVE AXIS: -72 DEGREES
VENTRICULAR RATE: 112 BPM
VENTRICULAR RATE: 93 BPM

## 2024-12-30 PROCEDURE — 93010 ELECTROCARDIOGRAM REPORT: CPT | Performed by: INTERNAL MEDICINE

## 2024-12-30 PROCEDURE — 97530 THERAPEUTIC ACTIVITIES: CPT

## 2024-12-30 PROCEDURE — 97167 OT EVAL HIGH COMPLEX 60 MIN: CPT

## 2024-12-30 PROCEDURE — 99232 SBSQ HOSP IP/OBS MODERATE 35: CPT | Performed by: INTERNAL MEDICINE

## 2024-12-30 PROCEDURE — 80048 BASIC METABOLIC PNL TOTAL CA: CPT

## 2024-12-30 PROCEDURE — 99232 SBSQ HOSP IP/OBS MODERATE 35: CPT | Performed by: PODIATRIST

## 2024-12-30 RX ORDER — TAMSULOSIN HYDROCHLORIDE 0.4 MG/1
0.4 CAPSULE ORAL
Status: DISCONTINUED | OUTPATIENT
Start: 2024-12-30 | End: 2025-01-01 | Stop reason: HOSPADM

## 2024-12-30 RX ORDER — FINASTERIDE 5 MG/1
5 TABLET, FILM COATED ORAL DAILY
Status: DISCONTINUED | OUTPATIENT
Start: 2024-12-30 | End: 2025-01-01 | Stop reason: HOSPADM

## 2024-12-30 RX ADMIN — POTASSIUM CHLORIDE 20 MEQ: 1500 TABLET, EXTENDED RELEASE ORAL at 08:18

## 2024-12-30 RX ADMIN — METHOCARBAMOL TABLETS 500 MG: 500 TABLET, COATED ORAL at 08:19

## 2024-12-30 RX ADMIN — DICLOFENAC SODIUM 2 G: 10 GEL TOPICAL at 11:35

## 2024-12-30 RX ADMIN — METHOCARBAMOL TABLETS 500 MG: 500 TABLET, COATED ORAL at 21:12

## 2024-12-30 RX ADMIN — EMPAGLIFLOZIN 10 MG: 10 TABLET, FILM COATED ORAL at 08:18

## 2024-12-30 RX ADMIN — EZETIMIBE 10 MG: 10 TABLET ORAL at 08:18

## 2024-12-30 RX ADMIN — PENTOXIFYLLINE 400 MG: 400 TABLET, EXTENDED RELEASE ORAL at 11:35

## 2024-12-30 RX ADMIN — CLOPIDOGREL BISULFATE 75 MG: 75 TABLET ORAL at 08:18

## 2024-12-30 RX ADMIN — OXYCODONE HYDROCHLORIDE 5 MG: 5 TABLET ORAL at 22:58

## 2024-12-30 RX ADMIN — PENTOXIFYLLINE 400 MG: 400 TABLET, EXTENDED RELEASE ORAL at 08:18

## 2024-12-30 RX ADMIN — ATORVASTATIN CALCIUM 80 MG: 40 TABLET, FILM COATED ORAL at 18:31

## 2024-12-30 RX ADMIN — ENOXAPARIN SODIUM 40 MG: 40 INJECTION SUBCUTANEOUS at 08:17

## 2024-12-30 RX ADMIN — METHOCARBAMOL TABLETS 500 MG: 500 TABLET, COATED ORAL at 18:30

## 2024-12-30 RX ADMIN — ASPIRIN 81 MG CHEWABLE TABLET 81 MG: 81 TABLET CHEWABLE at 08:18

## 2024-12-30 RX ADMIN — DICLOFENAC SODIUM 2 G: 10 GEL TOPICAL at 21:12

## 2024-12-30 RX ADMIN — GABAPENTIN 600 MG: 300 CAPSULE ORAL at 18:30

## 2024-12-30 RX ADMIN — FERROUS SULFATE TAB 325 MG (65 MG ELEMENTAL FE) 325 MG: 325 (65 FE) TAB at 08:18

## 2024-12-30 RX ADMIN — TAMSULOSIN HYDROCHLORIDE 0.4 MG: 0.4 CAPSULE ORAL at 18:31

## 2024-12-30 RX ADMIN — DICLOFENAC SODIUM 2 G: 10 GEL TOPICAL at 18:30

## 2024-12-30 RX ADMIN — ACETAMINOPHEN 650 MG: 325 TABLET, FILM COATED ORAL at 22:57

## 2024-12-30 RX ADMIN — GABAPENTIN 600 MG: 300 CAPSULE ORAL at 08:17

## 2024-12-30 RX ADMIN — DICLOFENAC SODIUM 2 G: 10 GEL TOPICAL at 08:21

## 2024-12-30 RX ADMIN — PREDNISONE 5 MG: 10 TABLET ORAL at 08:19

## 2024-12-30 RX ADMIN — PENTOXIFYLLINE 400 MG: 400 TABLET, EXTENDED RELEASE ORAL at 18:31

## 2024-12-30 RX ADMIN — FAMOTIDINE 20 MG: 20 TABLET, FILM COATED ORAL at 08:18

## 2024-12-30 RX ADMIN — FINASTERIDE 5 MG: 5 TABLET, FILM COATED ORAL at 11:35

## 2024-12-30 RX ADMIN — PREDNISONE 5 MG: 10 TABLET ORAL at 18:31

## 2024-12-30 NOTE — PHYSICAL THERAPY NOTE
PHYSICAL THERAPY TREATMENT NOTE    Patient Name: Royce Rene  Today's Date: 12/30/2024 12/30/24 1425   PT Last Visit   PT Visit Date 12/30/24   Pain Assessment   Pain Assessment Tool 0-10   Pain Score 8   Pain Location/Orientation Orientation: Right;Location: Foot   Hospital Pain Intervention(s) Repositioned;Ambulation/increased activity   Pain 2   Pain Score 2 3   Pain Location/Orientation 2 Orientation: Left;Location: Foot   Hospital Pain Intervention(s) 2 MD notified (Comment);Ambulation/increased activity   Restrictions/Precautions   RLE Weight Bearing Per Order WBAT   LLE Weight Bearing Per Order WBAT   Braces or Orthoses Other (Comment)  (bilateral surgical shoes)   Other Precautions Chair Alarm;Bed Alarm;WBS;Fall Risk;Pain;Cognitive   General   Chart Reviewed Yes   Family/Caregiver Present No   Cognition   Arousal/Participation Cooperative   Attention Attends with cues to redirect   Orientation Level Oriented to person;Other (Comment)  (pt was identified w/ full name, ID bracelet)   Following Commands Follows one step commands with increased time or repetition   Comments room air resting pulse ox 94% and 82 BPM.   Subjective   Subjective pt seen supine in bed. pt needed motivation to gain participation in PT intervention. pt states having pain of bilateral feet, worse on right.   Bed Mobility   Supine to Sit 3  Moderate assistance   Additional items Assist x 1;HOB elevated;Bedrails;Increased time required;Verbal cues;LE management  (for bedrail use, trunk/LE positioning)   Sit to Supine 4  Minimal assistance   Additional items Assist x 1;HOB elevated;Bedrails;Increased time required;Verbal cues;LE management  (for trunk/LE postioning)   Transfers   Sit to Stand 3  Moderate assistance   Additional items Assist x 1;Increased time required;Verbal cues  (for hand placement, LE positioning)   Stand to Sit 3  Moderate  assistance  (poorly controlled descent to sitting surface)   Additional items Assist x 1;Verbal cues  (for body positioning, safety)   Additional Comments pt stood 30 seconds and then 45 seconds x3 w/ roller walker and min to modx1. Seated rest breaks were required. additional standing not possible due to pain and fatigue. pt was unable to shift weight onto R LE. Pt was unable to advance/retreat.   Ambulation/Elevation   Gait pattern Not appropriate   Assistive Device Rolling walker   Balance   Static Sitting Fair +   Static Standing Poor +  (to poor, w/ roller walker)   Ambulatory Zero   Activity Tolerance   Activity Tolerance Patient limited by pain;Patient limited by fatigue   Nurse Made Aware spoke to Chava Goodman CM   Assessment   Problem List Decreased endurance;Impaired balance;Decreased mobility;Impaired judgement;Decreased safety awareness;Obesity;Decreased skin integrity;Pain;Decreased range of motion;Decreased strength;Orthopedic restrictions   Assessment limited session completed due to pt's reports of high level of pain. pt needed min to mod assist to perform bed mobility and transfers. pt was unable to complete pregait activities or perform stand pivot transfers. input was needed for mobility technique/safety w/ inconsistent carryover of education received. pt remains at risk for falls due to physical and safety awareness limitations. continued inpatient PT is indicated to decrease fall risk and maximize level of independence.   Goals   Patient Goals I want to be able to walk. I want to have less pain.   STG Expiration Date 01/11/24   Short Term Goal #1 pt will complete: 1) Bed mobility skills with Kristen to facilitate safe return to previous living environment. 2) Functional transfers with Kristen to facilitate safe return to previous living environment. 3) Ambulation with least restrictive AD 10' w/ Kristen without LOB for safe ambulation in home/community environment. 4) Improve balance scores by 1  grade to decrease fall risk. 5) Improve LE strength grades by 1 to increase independence w/ all functional mobility, transfers and gait. 6) PT for ongoing pt and family education; DME needs and D/C planning to promote highest level of function in least restrictive environment. 7) Stair training up/ down 6 steps with most appropriate technique and Kristen for safe access to previous living environment and to increase community access.   PT Treatment Day 1   Plan   Treatment/Interventions Functional transfer training;LE strengthening/ROM;Therapeutic exercise;Elevations;Endurance training;Patient/family training;Equipment eval/education;Bed mobility;Gait training;Compensatory technique education   Progress Slow progress, decreased activity tolerance   PT Frequency 2-3x/wk   Discharge Recommendation   Rehab Resource Intensity Level, PT II (Moderate Resource Intensity)   AM-PAC Basic Mobility Inpatient   Turning in Flat Bed Without Bedrails 3   Lying on Back to Sitting on Edge of Flat Bed Without Bedrails 2   Moving Bed to Chair 1   Standing Up From Chair Using Arms 2   Walk in Room 1   Climb 3-5 Stairs With Railing 1   Basic Mobility Inpatient Raw Score 10   Turning Head Towards Sound 4   Follow Simple Instructions 3   Low Function Basic Mobility Raw Score  17   Low Function Basic Mobility Standardized Score  27.46   [facility] Highest Level Of Mobility   -[facility] Goal 4: Move to chair/commode   -[facility] Achieved 3: Sit at edge of bed   End of Consult   Patient Position at End of Consult Supine;Bed/Chair alarm activated;All needs within reach     The patient's AM-PAC Basic Mobility Inpatient Short Form Raw Score is 10. A Raw score of less than or equal to 16 suggests the patient may benefit from discharge to post-acute rehabilitation services. Please also refer to the recommendation of the Physical Therapist for safe discharge planning.    Skilled inpatient PT recommended while in hospital to progress pt toward treatment  goals.    Duran Malone, PT

## 2024-12-30 NOTE — PROGRESS NOTES
Progress Note - Podiatry   Name: Royce Rene 79 y.o. male I MRN: [id number]  Unit/Bed#: S -01 I Date of Admission: 12/26/2024   Date of Service: 12/30/2024 I Hospital Day: 4     Assessment & Plan  Generalized weakness    Chronic combined systolic and diastolic CHF (congestive heart failure) (HCC)  Wt Readings from Last 3 Encounters:   12/26/24 100 kg (220 lb 14.4 oz)   12/13/24 103 kg (227 lb)   12/12/24 103 kg (227 lb)             Prostate cancer (HCC)    CAD (coronary artery disease)    PAD (peripheral artery disease) (HCC)    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)    Cancer-related pain    Ambulatory dysfunction    Electrolyte imbalance    Class 1 obesity due to excess calories with body mass index (BMI) of 33.0 to 33.9 in adult    Ulcer of left foot, limited to breakdown of skin (HCC)    Dry gangrene (HCC)    Hyponatremia    Diarrhea    Right ankle pain    Urinary retention    Vitamin B12 deficiency    Iron deficiency anemia      [facility] Podiatry - Progress Note  Patient: Royce Rene 79 y.o. male   MRN: [id number]  PCP: Anne Chandra MD  Unit/Bed#: S -01 Encounter: [id number]  Date Of Visit: 12/30/24    ASSESSMENT:    Royce Rene is a 79 y.o. male with:    Right foot great toe gangrene with concern for underlying osteomyelitis  Severe peripheral vascular disease  Ulceration left foot midfoot lateral      PLAN:    Patient is scheduled for limflow procedure by vascular surgery.  Continue local wound care, Betadine and dry sterile dressing changes recommended for bilateral foot ulcerations.  Appreciate nursing assistance with dressing changes.  Elevation on green foam wedges or pillows when non-ambulatory.  Rest of care per primary team.    Weight bearing status: As tolerated bilateral      SUBJECTIVE:     The patient was seen, evaluated, and assessed at bedside today. The patient was awake, alert, and in no acute distress. No acute events overnight.     The patient reports pain to the right  "foot.  Does have some pain to the left foot however not as severe..     Patient denies N/V/F/chills/SOB/CP.      OBJECTIVE:     Vitals:   /59   Pulse 103   Temp 99 °F (37.2 °C) (Oral)   Resp 18   Ht 5' 8\" (1.727 m)   Wt 100 kg (220 lb 14.4 oz)   SpO2 98%   BMI 33.59 kg/m²     Temp (24hrs), Av °F (37.2 °C), Min:99 °F (37.2 °C), Max:99 °F (37.2 °C)      Physical Exam:     General:  Alert, cooperative, and in no distress.  Lower extremity exam:  Cardiovascular status at baseline.  Neurological status at baseline.  Musculoskeletal status at baseline. No calf tenderness noted.  Necrotic tissue formation noted to the right great toe there is no significant ascending erythema noted currently.  There is an ulceration noted to the lateral aspect of the midfoot that is necrotic and no significant signs of ascending erythema noted currently.  There is coolness noted distally to the toes bilaterally.        Additional Data:     Labs:    Results from last 7 days   Lab Units 24  0427 24  0528 24  0608   WBC Thousand/uL 12.71*   < > 11.90*   HEMOGLOBIN g/dL 9.0*   < > 9.3*   HEMATOCRIT % 28.0*   < > 29.2*   PLATELETS Thousands/uL 341   < > 281   SEGS PCT %  --   --  83*   LYMPHO PCT %  --   --  7*   MONO PCT %  --   --  9   EOS PCT %  --   --  0    < > = values in this interval not displayed.     Results from last 7 days   Lab Units 24  0300 24  0528 24  0608   POTASSIUM mmol/L 3.7   < > 3.3*   CHLORIDE mmol/L 108   < > 107   CO2 mmol/L 22   < > 20*   BUN mg/dL 12   < > 13   CREATININE mg/dL 0.68   < > 0.78   CALCIUM mg/dL 7.9*   < > 7.0*   ALK PHOS U/L  --   --  70   ALT U/L  --   --  11   AST U/L  --   --  15    < > = values in this interval not displayed.           * I Have Reviewed All Lab Data Listed Above.    Recent Cultures (last 7 days):     Results from last 7 days   Lab Units 24  1326   C DIFF TOXIN B BY PCR  Negative           Imaging: I have personally reviewed " "pertinent films in PACS  EKG, Pathology, and Other Studies: I have personally reviewed pertinent reports.      ** Please Note: Portions of the record may have been created with voice recognition software. Occasional wrong word or \"sound a like\" substitutions may have occurred due to the inherent limitations of voice recognition software. Read the chart carefully and recognize, using context, where substitutions have occurred. **      "

## 2024-12-30 NOTE — ASSESSMENT & PLAN NOTE
Lab Results   Component Value Date    SODIUM 140 12/30/2024     Na 132 on arrival.  Hypovolemic hyponatremia.  Resolved  Likely multifactorial with dehydration contributing  Improved with IV fluids and PO intake  Resolved

## 2024-12-30 NOTE — ASSESSMENT & PLAN NOTE
New onset during this hospital admission  Had to be straight cath on 12/29 with 1.1 L removed  Most recent bladder scan showed 999 mL.  Patient was able to urinate on his own afterwards    Plan:  Start Flomax and finasteride  Measure postvoid residual  Urinary retention protocol  Patient is now amenable to Moser catheter if need be  If patient fails urinary retention protocol and requires Moser catheter, then consult urology

## 2024-12-30 NOTE — PLAN OF CARE
Problem: Prexisting or High Potential for Compromised Skin Integrity  Goal: Skin integrity is maintained or improved  Description: INTERVENTIONS:  - Identify patients at risk for skin breakdown  - Assess and monitor skin integrity  - Assess and monitor nutrition and hydration status  - Monitor labs   - Assess for incontinence   - Turn and reposition patient  - Assist with mobility/ambulation  - Relieve pressure over bony prominences  - Avoid friction and shearing  - Provide appropriate hygiene as needed including keeping skin clean and dry  - Evaluate need for skin moisturizer/barrier cream  - Collaborate with interdisciplinary team   - Patient/family teaching  - Consider wound care consult   Outcome: Progressing     Problem: PAIN - ADULT  Goal: Verbalizes/displays adequate comfort level or baseline comfort level  Description: Interventions:  - Encourage patient to monitor pain and request assistance  - Assess pain using appropriate pain scale  - Administer analgesics based on type and severity of pain and evaluate response  - Implement non-pharmacological measures as appropriate and evaluate response  - Consider cultural and social influences on pain and pain management  - Notify physician/advanced practitioner if interventions unsuccessful or patient reports new pain  Outcome: Progressing     Problem: INFECTION - ADULT  Goal: Absence or prevention of progression during hospitalization  Description: INTERVENTIONS:  - Assess and monitor for signs and symptoms of infection  - Monitor lab/diagnostic results  - Monitor all insertion sites, i.e. indwelling lines, tubes, and drains  - Monitor endotracheal if appropriate and nasal secretions for changes in amount and color  - [place] appropriate cooling/warming therapies per order  - Administer medications as ordered  - Instruct and encourage patient and family to use good hand hygiene technique  - Identify and instruct in appropriate isolation precautions for  identified infection/condition  Outcome: Progressing     Problem: SAFETY ADULT  Goal: Patient will remain free of falls  Description: INTERVENTIONS:  - Educate patient/family on patient safety including physical limitations  - Instruct patient to call for assistance with activity   - Consult OT/PT to assist with strengthening/mobility   - Keep Call bell within reach  - Keep bed low and locked with side rails adjusted as appropriate  - Keep care items and personal belongings within reach  - Initiate and maintain comfort rounds  - Make Fall Risk Sign visible to staff  - Offer Toileting every 2 Hours, in advance of need  - Initiate/Maintain bed alarm, comfort rounds  - Obtain necessary fall risk management equipment: alarms, non-slip socks  - Apply yellow socks and bracelet for high fall risk patients  - Consider moving patient to room near nurses station  Outcome: Progressing

## 2024-12-30 NOTE — UTILIZATION REVIEW
Initial Clinical Review    Admission: Date/Time/Statement:   Admission Orders (From admission, onward)       Ordered        12/26/24 1506  INPATIENT ADMISSION  Once                          Orders Placed This Encounter   Procedures    INPATIENT ADMISSION     Standing Status:   Standing     Number of Occurrences:   1     Level of Care:   Med Surg [16]     Estimated length of stay:   More than 2 Midnights     Certification:   I certify that inpatient services are medically necessary for this patient for a duration of greater than two midnights. See H&P and MD Progress Notes for additional information about the patient's course of treatment.     ED Arrival Information       Expected   12/26/2024     Arrival   12/26/2024 10:55    Acuity   Urgent              Means of arrival   Ambulance    Escorted by   [facility]    Service   Hospitalist    Admission type   Emergency              Arrival complaint   V/D Toe pain             Chief Complaint   Patient presents with    Weakness - Generalized     Onet 5 days ago getting worse       Initial Presentation: 79 y.o. male to ED by EMS for evaluation & management as Inpatient admission due to Generalized weakness, diarrhea, multiple blood electrolyte imbalances  PMH  prostatic prostate cancer receiving chemotherapy, peripheral artery disease, gangrene of right big toe, ambulatory dysfunction, obesity, CAD, chronic congestive heart failure  presents with generalized weakness.     Receiving chemotherapy for metastatic prostate cancer with oncology,  reports he received chemotherapy last week. & Since having episodes of diarrhea nausea vomiting, having poor p.o. intake. Son reports patient very weak with difficulty ambulating. Patient reports right ankle pain w difficulty bearing weight and ambulating. Known dry gangrene right big toe and follows with vascular surgery with plans for intervention on 2 January   EXAM  Right big toe gangrene, XR R foot sclerosis of  calcaneus possible stress injury, ulcer L foot, labs w leukocytosis @ 14.11, hypokalemia hypophosphatemia, hypomagnesemia, hyponatremia     Obtain stool studies, replete electrolytes/ follow trend;  Gentle IVF> obtain uric acid, CRP. Cont analgesia. Cont OP ASA, Plavix, statin, pentoxifylline, OP Vascular. Fall precautions, PT consult.  Anticipated Length of Stay/Certification Statement: Patient will be admitted on an inpatient basis with an anticipated length of stay of greater than 2 midnights secondary to generalized weakness, episodes of diarrhea, electrolyte imbalance for management as outlined.     Date: 12/27   Day 2:   Stool studies pending; following electrolytes, lo NA diet, gentle IVF NS for now, daily WT  Podiatry consult for R foot/ Wound consult & care for ulcer L foot     Podiatry  R ankle pain  dressings were taken down and his wounds examined. They were redressed with betadine paint/DSD B/L. The pedal wounds do not appear to be acutely infected.   Leukocytosis  tending down; CRP is 179.1; uric acid  11.5; lactic acid is 1.8.  X-rays of the right foot no signs of OM of the right hallux; sclerotic changes of the calcaneus are age indeterminate, and is of questionable clinical significance.   Arterial duplex from 10/24 showed an KARLI of 0.49 on the right; 0.67 on the left; GTP's were unobtainable B/L. Scheduled for a vascular procedure on 1/2/25.   Prednisone 5 mg twice daily for suspected acute gouty arthropathy.  Continue local wound care with betadine paint/DSD B/L. No surgical plans from podiatry at this time.  F/U with vascular surgery    Date: 12/28/2024  Day 3: Has surpassed a 2nd midnight with active treatments and services.  Presents s/p 1 wl OP CHEMO w/ episodes of diarrhea nausea vomiting, having poor p.o. intake. Son reports patient very weak with difficulty ambulating  On exam diarrhea improving  Diagnosis/Plan      GEN Weakness  Encourage PO intake  PT DC Dispo eval acute rehab    Diarrhea  Imodium PRN; replete electrolytes. C DIFF/ stool panel neg  Hyponatremia  Improved w IVF & PO intake, cont to monitor  R ankle pain  CRP elevation w uric acid 11.5 for possible gout; Podiatry following wounds not appearing infected  L foot ulcer  Per Podfiatry no surgical plan  Prostate CA w mets to bone  OP Oncology , last CHEMO 12/12 current regimen is Cabazitaxel, Neulasta, prednisone, Lupron, denosumab   ED Treatment-Medication Administration from 12/26/2024 1055 to 12/26/2024 2039         Date/Time Order Dose Route Action     12/26/2024 1203 fentaNYL injection 50 mcg 50 mcg Intravenous Given     12/26/2024 1204 multi-electrolyte (ISOLYTE-S PH 7.4) bolus 500 mL 500 mL Intravenous New Bag     12/26/2024 1203 ondansetron (ZOFRAN) injection 4 mg 4 mg Intravenous Given     12/26/2024 1430 magnesium sulfate IVPB (premix) SOLN 1 g 1 g Intravenous New Bag     12/26/2024 1814 potassium-sodium phosphateS (K-PHOS,PHOSPHA 250) -250 mg tablet 1 tablet 1 tablet Oral Given     12/26/2024 1645 calcium gluconate 1 g in sodium chloride 0.9% 50 mL (premix) 1 g Intravenous New Bag     12/26/2024 1730 atorvastatin (LIPITOR) tablet 80 mg 80 mg Oral Given     12/26/2024 1730 gabapentin (NEURONTIN) capsule 600 mg 600 mg Oral Given     12/26/2024 1731 levofloxacin (LEVAQUIN) tablet 500 mg 500 mg Oral Given     12/26/2024 1731 methocarbamol (ROBAXIN) tablet 500 mg 500 mg Oral Given     12/26/2024 1919 pentoxifylline (TRENtal) ER tablet 400 mg 400 mg Oral Given     12/26/2024 1731 predniSONE tablet 5 mg 5 mg Oral Given     12/26/2024 1735 sodium chloride 0.9 % infusion 75 mL/hr Intravenous New Bag     12/26/2024 1856 potassium phosphates 12 mmol in sodium chloride 0.9 % 250 mL infusion 12 mmol Intravenous New Bag     12/26/2024 1730 pantoprazole (PROTONIX) injection 40 mg 40 mg Intravenous Given     12/26/2024 1739 Diclofenac Sodium (VOLTAREN) 1 % topical gel 2 g 2 g Topical Given     12/26/2024 1930 albumin human  (FLEXBUMIN) 25 % injection 12.5 g 12.5 g Intravenous New Bag            Scheduled Medications:  aspirin, 81 mg, Oral, Daily  atorvastatin, 80 mg, Oral, Daily With Dinner  clopidogrel, 75 mg, Oral, Daily  cyanocobalamin, 1,000 mcg, Oral, Daily  Diclofenac Sodium, 2 g, Topical, 4x Daily  Empagliflozin, 10 mg, Oral, QAM  enoxaparin, 40 mg, Subcutaneous, Daily  ezetimibe, 10 mg, Oral, Daily  famotidine, 20 mg, Oral, Daily  ferrous sulfate, 325 mg, Oral, Daily  gabapentin, 600 mg, Oral, BID  losartan, 12.5 mg, Oral, Daily  methocarbamol, 500 mg, Oral, TID  metoprolol succinate, 25 mg, Oral, Daily  pentoxifylline, 400 mg, Oral, TID With Meals  potassium chloride, 20 mEq, Oral, Daily  potassium-sodium phosphateS, 1 tablet, Oral, 4x Daily (with meals and at bedtime)  predniSONE, 5 mg, Oral, BID With Meals    Continuous IV Infusions:  sodium chloride 0.9 % infusion  Rate: 75 mL/hr Dose: 75 mL/hr  Freq: Continuous Route: IV  Indications of Use: IV Hydration  Last Dose: Stopped (12/27/24 1827)  Start: 12/26/24 1700 End: 12/27/24 1734       PRN Meds:  acetaminophen, 650 mg, Oral, Q6H PRN  bismuth subsalicylate, 524 mg, Oral, Q6H PRN  loperamide, 2 mg, Oral, 4x Daily PRN  menthol-methyl salicylate, , Apply externally, 4x Daily PRN  ondansetron, 4 mg, Intravenous, Q6H PRN  oxyCODONE, 5 mg, Oral, BID PRN    ED Triage Vitals   Temperature Pulse Respirations Blood Pressure SpO2 Pain Score   12/26/24 1105 12/26/24 1105 12/26/24 1105 12/26/24 1220 12/26/24 1105 12/26/24 1105   98.4 °F (36.9 °C) (!) 111 16 92/53 96 % 10 - Worst Possible Pain     Weight (last 2 days)       None            Vital Signs (last 3 days)       Date/Time Temp Pulse Resp BP MAP (mmHg) SpO2 O2 Device Patient Position - Orthostatic VS Pain    12/30/24 08:22:28 -- 97 -- 100/49 66 93 % -- -- --    12/30/24 07:46:37 99 °F (37.2 °C) 100 18 96/49 65 94 % None (Room air) Lying --    12/29/24 2205 -- -- -- -- -- -- -- -- No Pain    12/29/24 16:01:18 98.8 °F (37.1 °C) 89  18 112/61 78 92 % -- -- --    12/29/24 08:23:22 98.9 °F (37.2 °C) 92 20 118/60 79 95 % -- -- --    12/28/24 23:51:54 98.8 °F (37.1 °C) 92 18 112/94 100 94 % -- -- --    12/28/24 2100 -- -- -- -- -- -- -- -- No Pain    12/28/24 1814 -- -- -- -- -- -- -- -- 8    12/28/24 11:32:49 -- 104 -- 127/99 108 92 % -- -- --    12/28/24 11:30:19 -- 93 -- 122/67 85 92 % -- -- --    12/28/24 08:17:22 99.4 °F (37.4 °C) 100 16 104/66 79 93 % -- -- --    12/27/24 2225 98.5 °F (36.9 °C) -- -- 103/65 78 -- None (Room air) Lying --    12/27/24 2100 -- -- -- -- -- -- None (Room air) -- No Pain    12/27/24 16:30:35 97.8 °F (36.6 °C) 88 20 160/92 115 94 % -- -- --    12/27/24 13:34:53 -- 94 -- 139/79 99 91 % -- -- --    12/27/24 1005 -- -- -- -- -- -- -- -- 10 - Worst Possible Pain    12/27/24 0924 -- -- -- -- -- -- -- -- 8    12/27/24 08:27:44 100.1 °F (37.8 °C) 95 19 121/71 88 92 % -- -- --    12/27/24 0822 -- -- -- -- -- -- -- -- 8              Pertinent Labs/Diagnostic Test Results:   Radiology:  XR foot 3+ views RIGHT   Final Interpretation by Christophe Olivera MD (12/26 1512)      No x-ray evidence of osteomyelitis at this time.      Some sclerosis of the calcaneus is identified of indeterminate etiology. This would be an unusual location for metastatic disease. Subtle stress injury is possible.        Cardiology:  ECG 12 lead   Final Result by Mitch Mendiola MD (12/30 0342)   Ventricular-paced rhythm   Undetermined rhythm   Rightward axis   Non-specific intra-ventricular conduction block   T wave abnormality, consider inferior ischemia   Abnormal ECG      Confirmed by Mitch Mendiola ([id number]) on 12/30/2024 3:42:03 AM      ECG 12 lead   Final Result by Mitch Mendiola MD (12/30 0337)   Sinus tachycardia Ventricular-paced rhythm      Possible Lateral infarct , age undetermined   T wave abnormality, consider inferior ischemia   Abnormal ECG      Confirmed by Mitch Mendiola ([id number]) on 12/30/2024 3:37:54 AM        GI:  No orders to  "display           Results from last 7 days   Lab Units 12/29/24  0427 12/28/24  0528 12/27/24  0608 12/26/24  1155   WBC Thousand/uL 12.71* 10.24* 11.90* 14.11*   HEMOGLOBIN g/dL 9.0* 8.9* 9.3* 10.2*   HEMATOCRIT % 28.0* 27.6* 29.2* 30.3*   PLATELETS Thousands/uL 341 313 281 274   TOTAL NEUT ABS Thousands/µL  --   --  9.80* 12.03*         Results from last 7 days   Lab Units 12/30/24  0300 12/29/24  0427 12/28/24  0528 12/27/24  0608 12/26/24  1155   SODIUM mmol/L 140 142 141 137 132*   POTASSIUM mmol/L 3.7 3.3* 3.3* 3.3* 3.3*   CHLORIDE mmol/L 108 109* 111* 107 101   CO2 mmol/L 22 21 20* 20* 19*   ANION GAP mmol/L 10 12 10 10 12   BUN mg/dL 12 11 10 13 15   CREATININE mg/dL 0.68 0.69 0.67 0.78 0.84   EGFR ml/min/1.73sq m 90 90 91 85 83   CALCIUM mg/dL 7.9* 7.6* 6.9* 7.0* 7.3*   CALCIUM, IONIZED mmol/L  --  1.00* 0.97*  --   --    MAGNESIUM mg/dL  --  1.9 1.9 1.9 1.8*   PHOSPHORUS mg/dL  --  2.3 2.2* 1.9* 1.4*     Results from last 7 days   Lab Units 12/27/24  0608 12/26/24  1155   AST U/L 15 14   ALT U/L 11 13   ALK PHOS U/L 70 86   TOTAL PROTEIN g/dL 5.8* 6.3*   ALBUMIN g/dL 3.4* 3.6   TOTAL BILIRUBIN mg/dL 0.54 0.65         Results from last 7 days   Lab Units 12/30/24  0300 12/29/24  0427 12/28/24  0528 12/27/24  0608 12/26/24  1155   GLUCOSE RANDOM mg/dL 97 99 102 93 111             No results found for: \"BETA-HYDROXYBUTYRATE\"       Results from last 7 days   Lab Units 12/26/24  1155   PH ROSA  7.419*   PCO2 ROSA mm Hg 28.9*   PO2 ROSA mm Hg 48.3*   HCO3 ROSA mmol/L 18.3*   BASE EXC ROSA mmol/L -5.1   O2 CONTENT ROSA ml/dL 13.7   O2 HGB, VENOUS % 87.1*             Results from last 7 days   Lab Units 12/26/24  1620 12/26/24  1429 12/26/24  1155   HS TNI 0HR ng/L  --   --  118*   HS TNI 2HR ng/L  --  107*  --    HSTNI D2 ng/L  --  -11  --    HS TNI 4HR ng/L 105*  --   --    HSTNI D4 ng/L -13  --   --                      Results from last 7 days   Lab Units 12/26/24  1155   LACTIC ACID mmol/L 1.8           Results " from last 7 days   Lab Units 12/26/24  1155   LIPASE u/L 20     Results from last 7 days   Lab Units 12/26/24  1620   CRP mg/L 179.1*                                 Results from last 7 days   Lab Units 12/27/24  1326   C DIFF TOXIN B BY PCR  Negative     Results from last 7 days   Lab Units 12/27/24  1326   SALMONELLA SP PCR  Negative   SHIGELLA SP/ENTEROINVASIVE E. 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    Coronary artery disease 2001    S/p stenting to circumflex and RCA    HFrEF (heart failure with reduced ejection fraction) (HCC) 06/14/2021    High cholesterol     Pacemaker     Prostate cancer (HCC)      Present on Admission:   Ambulatory dysfunction   CAD (coronary artery disease)   Cancer-related pain   Chronic combined systolic and diastolic CHF (congestive heart failure) (HCC)   Dry gangrene (HCC)   PAD (peripheral artery disease) (HCC)   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)   Prostate cancer (HCC)   Electrolyte imbalance   Ulcer of left foot, limited to breakdown of skin (HCC)   Vitamin B12 deficiency      Admitting Diagnosis: Elevated troponin [R79.89]  Age/Sex: 79 y.o. male    Network Utilization Review Department  ATTENTION: Please call with any questions or concerns to [phone] and carefully listen to the prompts so that you are directed to the right person. All voicemails are confidential.   For Discharge needs, contact Care Management DC Support Team at [phone] opt. [phone]  Send all requests for admission clinical reviews, approved or denied determinations and any other requests to dedicated fax number below belonging to the campus where the patient is receiving treatment. List of dedicated fax numbers for the Facilities:  FACILITY NAME UR FAX NUMBER   ADMISSION DENIALS (Administrative/Medical Necessity) [phone]   DISCHARGE SUPPORT  TEAM (NETWORK) [phone]   PARENT CHILD HEALTH (Maternity/NICU/Pediatrics) [phone]   [facility] [phone]   [facility] [phone]   [facility] [phone]   [facility] [phone]   [facility] [phone]   [facility] [phone]   [facility] [phone]   [facility] [phone]   [facility] [phone]   [facility] [phone]   [facility] [phone]   [facility] [phone]

## 2024-12-30 NOTE — QUICK NOTE
Left detailed message for patient's son, Rodrigue, over the phone.  Updated him in regards to patient's new onset urinary retention, plan for rehab placement ,and plan to hold chemotherapy in the interim to facilitate rehabilitation.

## 2024-12-30 NOTE — PLAN OF CARE
Problem: OCCUPATIONAL THERAPY ADULT  Goal: Performs self-care activities at highest level of function for planned discharge setting.  See evaluation for individualized goals.  Description: Treatment Interventions: ADL retraining, Functional transfer training, Endurance training, Cognitive reorientation, Patient/family training, Equipment evaluation/education, Compensatory technique education, Continued evaluation, Energy conservation, Activityengagement          See flowsheet documentation for full assessment, interventions and recommendations.   Note: Limitation: Decreased ADL status, Decreased Safe judgement during ADL, Decreased cognition, Decreased endurance, Decreased self-care trans, Decreased high-level ADLs (pain, balance, fxnl mobility, act ashley, fxnl reach, standing ashley, strength, and fxnl sitting ashley, safety awareness, insight, pacing, and problem solving, emotional regulation)  Prognosis: Good  Assessment: Pt is a 79 y.o. male seen for OT evaluation s/p admit to [facility] on 12/26/2024 w/Generalized weakness. Prior to admission, pt was living with son in a 2 level house, (I) with ADLs, (A) with IADLs, electric scooter for mobility, (+) falls, (-) . Personal and environmental factors affecting patient at time of evaluation include advanced age, current habits and behavioral patterns, limited social support, inaccessible home environment, inaccessible bathroom environment, and difficulty completing ADLs. Personal factors supporting patient at time of evaluation include (I) PLOF, supportive son, and single floor living area . Based upon this evaluation, pt is functioning below baseline. Pt will benefit from continued skilled inpatient OT to maximize safety, level of independence and overall performance in ADLs, functional transfers, functional mobility to return to functional baseline/highest level of function.  Recommendation: Geriatric Consult, Psych Consult  Rehab Resource Intensity  Level, OT: II (Moderate Resource Intensity)     ED Garrido, OTR/L  [place] License [id number]  [place] License [id number]

## 2024-12-30 NOTE — QUICK NOTE
Patient follows with Dr. Hernandez for metastatic prostate cancer and chemotherapy.    Per CM, if patient were to go to rehab, chemotherapy would have to be on hold.    I spoke to the patient.  He would like to go to rehab especially with vascular surgical intervention coming up soon and agrees with plan to hold chemotherapy.  I spoke with oncology on-call, Dr. Dodson, who is aware of the plan.

## 2024-12-30 NOTE — ASSESSMENT & PLAN NOTE
Patient with episodes of diarrhea since receiving chemotherapy last week.  He also reports some nausea and vomiting.  Poor PO intake  Likely chemotherapy associated.  Improving  C. difficile and stool enteric panel negative  Supportive cares  Replete electrolytes  Imodium as needed

## 2024-12-30 NOTE — OCCUPATIONAL THERAPY NOTE
Occupational Therapy Evaluation     Patient Name: Royce Rene  Today's Date: 12/30/2024  Problem List  Principal Problem:    Generalized weakness  Active Problems:    Chronic combined systolic and diastolic CHF (congestive heart failure) (HCC)    Prostate cancer (HCC)    CAD (coronary artery disease)    PAD (peripheral artery disease) (HCC)    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)    Cancer-related pain    Ambulatory dysfunction    Electrolyte imbalance    Class 1 obesity due to excess calories with body mass index (BMI) of 33.0 to 33.9 in adult    Ulcer of left foot, limited to breakdown of skin (HCC)    Dry gangrene (HCC)    Vitamin B12 deficiency    Hyponatremia    Diarrhea    Right ankle pain    Iron deficiency anemia    Past Medical History  Past Medical History:   Diagnosis Date    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    Coronary artery disease 2001    S/p stenting to circumflex and RCA    HFrEF (heart failure with reduced ejection fraction) (HCC) 06/14/2021    High cholesterol     Pacemaker     Prostate cancer (HCC)      Past Surgical History  Past Surgical History:   Procedure Laterality Date    CARDIAC CATHETERIZATION      CARDIAC ELECTROPHYSIOLOGY PROCEDURE N/A 12/23/2021    Procedure: Implant ICD - bi ventricular;  Surgeon: Jonas Oviedo MD;  Location: [facility];  Service: Cardiology    COLONOSCOPY      IR LOWER EXTREMITY ANGIOGRAM  04/18/2023    IR LOWER EXTREMITY ANGIOGRAM  10/30/2024    CA TEAEC W/WO PATCH GRAFT COMMON FEMORAL Right 07/09/2021    Procedure: ENDARTERECTOMY ARTERIAL FEMORAL;  Surgeon: Serina Cook DO;  Location: [facility];  Service: Vascular         12/30/24 1013   OT Last Visit   OT Visit Date 12/30/24   Note Type   Note type Evaluation   Pain Assessment   Pain Assessment Tool FLACC   Pain Location/Orientation Orientation: Bilateral;Location: Foot   Pain Onset/Description Onset: Ongoing;Descriptor: Burning   Effect of Pain on Daily Activities limits activity  tolerance, comfort, standing ADLs   Patient's Stated Pain Goal No pain   Hospital Pain Intervention(s) Repositioned;Ambulation/increased activity;Emotional support   Pain Rating: FLACC (Rest) - Face 0   Pain Rating: FLACC (Rest) - Legs 0   Pain Rating: FLACC (Rest) - Activity 0   Pain Rating: FLACC (Rest) - Cry 0   Pain Rating: FLACC (Rest) - Consolability 0   Score: FLACC (Rest) 0   Pain Rating: FLACC (Activity) - Face 1   Pain Rating: FLACC (Activity) - Legs 1   Pain Rating: FLACC (Activity) - Activity 1   Pain Rating: FLACC (Activity) - Cry 1   Pain Rating: FLACC (Activity) - Consolability 1   Score: FLACC (Activity) 5   Restrictions/Precautions   Weight Bearing Precautions Per Order Yes   RLE Weight Bearing Per Order WBAT   LLE Weight Bearing Per Order WBAT   Braces or Orthoses Other (Comment)  (surgical shoes BLE)   Other Precautions Bed Alarm;Cognitive;Chair Alarm;WBS;Fall Risk;Pain   Home Living   Type of Home House   Home Layout Multi-level;Performs ADLs on one level;Able to live on main level with bedroom/bathroom;Stairs to enter with rails  (stair glide from entrance to landing then 5-6 [place] to living level)   Bathroom Shower/Tub Tub/shower unit   Bathroom Toilet Raised   Bathroom Equipment Grab bars in shower;Shower chair;Toilet raiser   Bathroom Accessibility Accessible   Home Equipment Electric scooter;Sock aid  (rollator; x 2 electric scooter)   Additional Comments Son lives on third floor   Prior Function   Level of [place] Independent with ADLs;Independent with functional mobility;Needs assistance with IADLS   Lives With Son  (works 6am-3pm)   Receives Help From Family   IADLs Family/Friend/Other provides transportation;Family/Friend/Other provides meals;Independent with medication management  (son vs star transport; (I) laundry; MOW)   Falls in the last 6 months 1 to 4  (2 falls per pt)   Vocational Retired   Comments Pt reports being (I) w/ ADLs PTA. Reports decline in ADLs/mobility recently  "due to b/l foot pain. Mod (I) transfers to electric scooter>bathroom. Is usually able to take a few steps into bathroom. Reports recent intermittent A from son for ADLs. Sock aide for sock management   Lifestyle   Autonomy Pt lives w/ son in a 2 level house and is (I) w/ ADLs PTA, no AD vs electric scooter, (+) falls, (-)    Reciprocal Relationships Supportive son, MOW   Service to Others Retired   Intrinsic Gratification Pt enjoys watching wheel of fortune and the price is right   General   Additional Pertinent History Pt admitted w/ generalized weakness,  failure to thrive, poor appetite, episodes of loose stools. Hx of prostate CA, last chemotherapy (last on 12/12). Found to have dry gangrene of R foot. PMH includes: CHF, CAD, PAD, cancer related pain, obesity, anemia, depression, peripheral neuropathy, moderate vascular dementia, HLD   Family/Caregiver Present No   Subjective   Subjective \"That's bullsh*t\" \"I'm not going to be alarmed at home!\"   ADL   Where Assessed Chair   Eating Assistance 5  Supervision/Setup   Eating Deficit Setup;Beverage management   Grooming Assistance 5  Supervision/Setup   UB Bathing Assistance 4  Minimal Assistance   LB Bathing Assistance 2  Maximal Assistance   UB Dressing Assistance 4  Minimal Assistance   LB Dressing Assistance 2  Maximal Assistance   LB Dressing Deficit Setup;Supervision/safety;Increased time to complete;Thread RLE into pants;Thread LLE into pants  (attempted to don pants sitting in recliner, pt declining to attempt stating he is unable to don pants w/ socks on, offered to remove pt socks, continues to decline. Max A, unwilling to stand to attempt pulling over hips)   Toileting Assistance  2  Maximal Assistance   Bed Mobility   Supine to Sit 5  Supervision   Additional items Assist x 1;HOB elevated;Bedrails;Increased time required;Verbal cues   Sit to Supine 5  Supervision   Additional items Assist x 1;Increased time required;Verbal cues   Additional " Comments Able to sit EOB w/ , denies lightheadedness. Declined remaining in recliner due to alarm, wishing to be able to get back to bed w/o assist. Edu provided, pt agitated. Returned to supine in bed at this time to satisfy.   Transfers   Sit pivot 4  Minimal assistance   Additional items Assist x 1;Increased time required;Verbal cues  (from EOB><recliner w/ min A x 1 HHA)   Additional Comments Pt declines attmept at [facility] at this time due to b/l foot pain   Functional Mobility   Additional Comments Pt refusing attempt at this time due to b/l foot pain   Balance   Static Sitting Fair   Dynamic Sitting Fair -   Static Standing Poor +   Dynamic Standing Poor   Activity Tolerance   Activity Tolerance Patient limited by fatigue;Patient limited by pain   Nurse Made Aware yes, RN Tia olmos to see pt and updated   RUE Assessment   RUE Assessment WFL   LUE Assessment   LUE Assessment WFL   Hand Function   Gross Motor Coordination Functional   Fine Motor Coordination Functional   Sensation   Light Touch No apparent deficits   Vision-Basic Assessment   Current Vision Wears glasses all the time  (not present for session)   Cognition   Overall Cognitive Status Impaired   Arousal/Participation Alert;Cooperative   Attention Attends with cues to redirect   Orientation Level Oriented X4  (grossly oriented to month/year)   Memory Decreased short term memory;Decreased recall of recent events;Decreased recall of precautions   Following Commands Follows one step commands with increased time or repetition   Comments Initially pleasant and cooperative. Somewhat agitated w/ onset of b/l foot pain. Impulsive, poor safety. Frustrated w/ need to be alarmed in chair, yelling at therapist. Hx of moderate vascular dementia. Will continue to assess   Assessment   Limitation Decreased ADL status;Decreased Safe judgement during ADL;Decreased cognition;Decreased endurance;Decreased self-care trans;Decreased high-level ADLs  (pain, balance, fxnl  mobility, act ashley, fxnl reach, standing ashley, strength, and fxnl sitting ashley, safety awareness, insight, pacing, and problem solving, emotional regulation)   Prognosis Good   Assessment Pt is a 79 y.o. male seen for OT evaluation s/p admit [facility] on [facility]/26[facility]24 w/Generalized weakness. Prior to admission, pt was living with son in a 2 level house, (I) with ADLs, (A) with IADLs, electric scooter for mobility, (+) falls, (-) . Personal and environmental factors affecting patient at time of evaluation include advanced age, current habits and behavioral patterns, limited social support, inaccessible home environment, inaccessible bathroom environment, and difficulty completing ADLs. Personal factors supporting patient at time of evaluation include (I) PLOF, supportive son, and single floor living area . Based upon this evaluation, pt is functioning below baseline. Pt will benefit from continued skilled inpatient OT to maximize safety, level of independence and overall performance in ADLs, functional transfers, functional mobility to return to functional baseline/highest level of function.   Goals   Patient Goals to take care of my foot pain   [facility] Time Frame 10-14   Long Term Goal #1 see goals below   Plan   Treatment Interventions ADL retraining;Functional transfer training;Endurance training;Cognitive reorientation;Patient/family training;Equipment evaluation/education;Compensatory technique education;Continued evaluation;Energy conservation;Activityengagement   Goal Expiration Date 01/09/25   OT Treatment Day 0   OT Frequency 3-5x/wk   Discharge Recommendation   Recommendation Geriatric Consult;Psych Consult   Rehab Resource Intensity Level, OT II (Moderate Resource Intensity)   AM-PAC Daily Activity Inpatient   Lower Body Dressing 2   Bathing 2   Toileting 2   Upper Body Dressing 3   Grooming 3   Eating 4   Daily Activity Raw Score 16   Daily Activity Standardized Score (Calc for Raw Score >=11)  35.96   AM-PAC Applied Cognition Inpatient   Following a Speech/Presentation 3   Understanding Ordinary Conversation 4   Taking Medications 2   Remembering Where Things Are Placed or Put Away 3   Remembering List of 4-5 Errands 2   Taking Care of Complicated Tasks 2   Applied Cognition Raw Score 16   Applied Cognition Standardized Score 35.03   End of Consult   Patient Position at End of Consult Supine;All needs within reach;Bed/Chair alarm activated   Nurse Communication Nurse aware of consult     GOALS:    *Goals established to promote patient goal of to get rid of this foot pain:      *Patient will perform grooming tasks sitting at sink with (I) in order to increase overall independence     *UB ADL with (I) for inc'd independence with self care    *LB ADL with Min (A) using AE prn for inc'd independence with self care    *Toileting with Min (A) for clothing management and hygiene to increase hygiene/thoroughness in order to reduce caregiver burden    *Pt will demonstrate use of long handled AE during 100% of tx sessions for increased ADL safety and independence following D/C     *ADL transfers with (S) for inc'd independence with ADLs/purposeful tasks    *Bed mobility- (I) for inc'd independence to manage own comfort and initiate EOB & OOB purposeful tasks    *Patient will increase functional mobility to and from bathroom with wheelchair with supervision to increase independence with toileting    *Patient will increase OOB/sitting tolerance to 2-4 hours per day to increase participation in self-care and leisure tasks with no s/s of exertion.     *Increase stand tolerance x 2-3  m for inc'd tolerance with standing purposeful tasks including hygiene/LB dressing    *Patient will improve functional activity tolerance to 20 minutes of sustained functional tasks to increase participation in basic self-care and decrease assistance level.     *Patient will engage in ongoing cognitive assessment to assist with safe  discharge planning/recommendations.     The patient's raw score on the AM-PAC Daily Activity Inpatient Short Form is 16. A raw score of less than 19 suggests the patient may benefit from discharge to post-acute rehabilitation services. Please also refer to the recommendation of the Occupational Therapist for safe discharge planning.      ED Garrido, OTR/L    [place] License [id number]  [place] License [id number]

## 2024-12-30 NOTE — PROGRESS NOTES
Progress Note - Hospitalist   Name: Royce Rene 79 y.o. male I MRN: [id number]  Unit/Bed#: S -01 I Date of Admission: 12/26/2024   Date of Service: 12/30/2024 I Hospital Day: 4    Assessment & Plan  Generalized weakness  Presents with generalized weakness, failure to thrive, poor appetite, episodes of loose stools.  Denies fever, chills, sweats, constitutional symptoms  Likely multifactorial - chemotherapy, and V/D, poor oral intake  Supportive cares  Medically cleared for discharge.  Pending rehab placement  Diarrhea  Patient with episodes of diarrhea since receiving chemotherapy last week.  He also reports some nausea and vomiting.  Poor PO intake  Likely chemotherapy associated.  Improving  C. difficile and stool enteric panel negative  Supportive cares  Replete electrolytes  Imodium as needed  Urinary retention  New onset during this hospital admission  Had to be straight cath on 12/29 with 1.1 L removed  Most recent bladder scan showed 999 mL.  Patient was able to urinate on his own afterwards    Plan:  Start Flomax and finasteride  Measure postvoid residual  Urinary retention protocol  Patient is now amenable to Moser catheter if need be  If patient fails urinary retention protocol and requires Moser catheter, then consult urology    Right ankle pain  Patient reports right ankle pain  No obvious erythema swelling  X-ray reveals some sclerosis of calcaneus possible stress injury  CRP 79.1, uric acid 11.5.  Possible gout  Podiatry following, appreciate recommendations   Wounds do not appear to be acutely infected.  Agree with prednisone for acute gouty arthropathy.  No surgical plans  Supportive cares    Chronic combined systolic and diastolic CHF (congestive heart failure) ([facility])  Wt Readings from Last 3 Encounters:   12/26/24 100 kg (220 lb 14.4 oz)   12/13/24 103 kg (227 lb)   12/12/24 103 kg (227 lb)     Home diuretic: PO Lasix 40mg QD - which was held during hospital stay.  Can resume  Continue  metoprolol, losartan, Jardiance  Low-salt diet  Monitor I/O, daily weights  Prostate cancer (HCC)  History of prostate cancer follows with oncology receiving chemotherapy (last on 12/12)  Follows with Dr. Hernandez  Current regimen is Cabazitaxel, Neulasta, prednisone, Lupron, denosumab  Outpatient oncology inputs noted  Outpatient follow-up  CAD (coronary artery disease)  Continue aspirin, metoprolol, atorvastatin  PAD (peripheral artery disease) (HCC)  History of extensive peripheral arterial disease  Patient with gangrene of right toe  Continue aspirin, Plavix, atorvastatin, pentoxifylline  Outpatient vascular surgery follow-up.  Plan for intervention on 1/2/2025  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)  See plan under prostate cancer  Cancer-related pain  Oxycodone as needed  Supportive cares  Currently well controlled  Ambulatory dysfunction  Baseline- close to wheelchair bounded- minimal walk.  Fall precautions  PT recommended level II rehab.  Patient is amenable.  CM aware.  Disposition pending placement  Electrolyte imbalance  Lab Results   Component Value Date    SODIUM 140 12/30/2024    K 3.7 12/30/2024    MG 1.9 12/29/2024    PHOS 2.3 12/29/2024    CAIONIZED 1.00 (L) 12/29/2024     With multiple electrolyte imbalances upon arrival   Replete and monitor    Hyponatremia  Lab Results   Component Value Date    SODIUM 140 12/30/2024     Na 132 on arrival.  Hypovolemic hyponatremia.  Resolved  Likely multifactorial with dehydration contributing  Improved with IV fluids and PO intake  Resolved  Class 1 obesity due to excess calories with body mass index (BMI) of 33.0 to 33.9 in adult  Therapeutic lifestyle modification encouraged  Dry gangrene (HCC)  Dry gangrene right big toe with history of peripheral disease  Vascular surgery plans for outpatient follow-up noted    Ulcer of left foot, limited to breakdown of skin (HCC)  Pic under media.  Low suspicion for active infection  Per podiatry, no surgical  plans  Iron deficiency anemia  Lab Results   Component Value Date    IRON 21 (L) 12/28/2024    FERRITIN 1,012 (H) 12/28/2024     Continue iron tablet 325 mg  Vitamin B12 deficiency  Lab Results   Component Value Date    [id number] 1,528 (H) 12/28/2024     Stop home Vitamin B12 supplementation    VTE Pharmacologic Prophylaxis: VTE Score: 8 High Risk (Score >/= 5) - Pharmacological DVT Prophylaxis Ordered: enoxaparin (Lovenox). Sequential Compression Devices Ordered.    Mobility:   Basic Mobility Inpatient Raw Score: 12  JH-HLM Goal: 4: Move to chair/commode  JH-HLM Achieved: 4: Move to chair/commode  JH-HLM Goal achieved. Continue to encourage appropriate mobility.    Patient Centered Rounds: I performed bedside rounds with nursing staff today.   Discussions with Specialists or Other Care Team Provider: Podiatry    Education and Discussions with Family / Patient:  Will update family.     Current Length of Stay: 4 day(s)  Current Patient Status: Inpatient   Certification Statement: The patient will continue to require additional inpatient hospital stay due to rehab placement  Discharge Plan: Anticipate discharge in 24-48 hrs to rehab facility.    Code Status: Level 1 - Full Code    Subjective   Yesterday, patient developed new onset urinary retention.  Bladder scan showed 556 mL.  He was straight cathed with 1.1 L of urine output.  This morning, patient had asymptomatic hypotension with blood pressure 96/49 and 100/49.    Patient was seen and examined at bedside.  He reports not being able to urinate since yesterday.  Bladder scan this morning showed 999 mL.  Patient stated he wanted to try to urinate first on his own, and he was able to.  He had 2-3 loose stools overnight.  He denies any abdominal pain, nausea, vomiting, suprapubic discomfort, dysuria.  He feels stronger compared to when he first came into the hospital.  He denies any chest pain, difficulty breathing, lightheadedness.    Objective :  Temp:  [98.8 °F  (37.1 °C)-99 °F (37.2 °C)] 99 °F (37.2 °C)  HR:  [] 107  BP: ()/(49-62) 118/62  Resp:  [18] 18  SpO2:  [92 %-94 %] 94 %  O2 Device: None (Room air)    Body mass index is 33.59 kg/m².     Input and Output Summary (last 24 hours):     Intake/Output Summary (Last 24 hours) at 12/30/2024 1139  Last data filed at 12/30/2024 0626  Gross per 24 hour   Intake 0 ml   Output 1100 ml   Net -1100 ml       Physical Exam  Constitutional:       General: He is not in acute distress.     Appearance: Normal appearance. He is obese. He is ill-appearing. He is not toxic-appearing.   HENT:      Head: Normocephalic and atraumatic.      Mouth/Throat:      Mouth: Mucous membranes are moist.   Eyes:      Extraocular Movements: Extraocular movements intact.   Cardiovascular:      Rate and Rhythm: Normal rate and regular rhythm.   Pulmonary:      Effort: No respiratory distress.      Breath sounds: No wheezing, rhonchi or rales.   Abdominal:      General: Bowel sounds are normal. There is distension.      Palpations: Abdomen is soft.      Tenderness: There is no abdominal tenderness. There is no guarding or rebound.   Musculoskeletal:         General: Tenderness (right foot) present. No swelling.      Cervical back: Normal range of motion and neck supple.   Skin:     General: Skin is warm.      Capillary Refill: Capillary refill takes less than 2 seconds.      Findings: Lesion (RLE - see media) present.   Neurological:      General: No focal deficit present.      Mental Status: He is alert and oriented to person, place, and time.       Lines/Drains:    Central Line:  Goal for removal:  Chemo port             Lab Results: I have reviewed the following results:   Results from last 7 days   Lab Units 12/29/24  0427 12/28/24  0528 12/27/24  0608   WBC Thousand/uL 12.71*   < > 11.90*   HEMOGLOBIN g/dL 9.0*   < > 9.3*   HEMATOCRIT % 28.0*   < > 29.2*   PLATELETS Thousands/uL 341   < > 281   SEGS PCT %  --   --  83*   LYMPHO PCT %  --    --  7*   MONO PCT %  --   --  9   EOS PCT %  --   --  0    < > = values in this interval not displayed.     Results from last 7 days   Lab Units 12/30/24  0300 12/28/24  0528 12/27/24  0608   SODIUM mmol/L 140   < > 137   POTASSIUM mmol/L 3.7   < > 3.3*   CHLORIDE mmol/L 108   < > 107   CO2 mmol/L 22   < > 20*   BUN mg/dL 12   < > 13   CREATININE mg/dL 0.68   < > 0.78   ANION GAP mmol/L 10   < > 10   CALCIUM mg/dL 7.9*   < > 7.0*   ALBUMIN g/dL  --   --  3.4*   TOTAL BILIRUBIN mg/dL  --   --  0.54   ALK PHOS U/L  --   --  70   ALT U/L  --   --  11   AST U/L  --   --  15   GLUCOSE RANDOM mg/dL 97   < > 93    < > = values in this interval not displayed.                 Results from last 7 days   Lab Units 12/26/24  1155   LACTIC ACID mmol/L 1.8       Recent Cultures (last 7 days):   Results from last 7 days   Lab Units 12/27/24  1326   C DIFF TOXIN B BY PCR  Negative       Imaging Results Review: I reviewed radiology reports from this admission including: xray(s).  Other Study Results Review: EKG was reviewed.     Last 24 Hours Medication List:     Current Facility-Administered Medications:     acetaminophen (TYLENOL) tablet 650 mg, Q6H PRN    aspirin chewable tablet 81 mg, Daily    atorvastatin (LIPITOR) tablet 80 mg, Daily With Dinner    bismuth subsalicylate (PEPTO BISMOL) oral suspension 524 mg, Q6H PRN    clopidogrel (PLAVIX) tablet 75 mg, Daily    Diclofenac Sodium (VOLTAREN) 1 % topical gel 2 g, 4x Daily    Empagliflozin (JARDIANCE) tablet 10 mg, QAM    enoxaparin (LOVENOX) subcutaneous injection 40 mg, Daily    ezetimibe (ZETIA) tablet 10 mg, Daily    famotidine (PEPCID) tablet 20 mg, Daily    ferrous sulfate tablet 325 mg, Daily    finasteride (PROSCAR) tablet 5 mg, Daily    furosemide (LASIX) tablet 40 mg, Daily    gabapentin (NEURONTIN) capsule 600 mg, BID    loperamide (IMODIUM) capsule 2 mg, 4x Daily PRN    losartan (COZAAR) tablet 12.5 mg, Daily    menthol-methyl salicylate (BENGAY) 10-15 % cream, 4x  Daily PRN    methocarbamol (ROBAXIN) tablet 500 mg, TID    metoprolol succinate (TOPROL-XL) 24 hr tablet 25 mg, Daily    ondansetron (ZOFRAN) injection 4 mg, Q6H PRN    oxyCODONE (ROXICODONE) IR tablet 5 mg, BID PRN    pentoxifylline (TRENtal) ER tablet 400 mg, TID With Meals    potassium chloride (Klor-Con M20) CR tablet 20 mEq, Daily    predniSONE tablet 5 mg, BID With Meals    tamsulosin (FLOMAX) capsule 0.4 mg, Daily With Dinner    Administrative Statements   Today, Patient Was Seen By: Na Solomon DO      **Please Note: This note may have been constructed using a voice recognition system.**

## 2024-12-30 NOTE — ASSESSMENT & PLAN NOTE
Had to be straight cathed x 1  Started on Flomax and Finasteride with improvement in symptoms  Has been able to urinate on his own  Urinary retention protocol

## 2024-12-30 NOTE — PLAN OF CARE
Problem: PHYSICAL THERAPY ADULT  Goal: Performs mobility at highest level of function for planned discharge setting.  See evaluation for individualized goals.  Description: Treatment/Interventions: Functional transfer training, LE strengthening/ROM, Therapeutic exercise, Elevations, Endurance training, Patient/family training, Equipment eval/education, Bed mobility, Gait training, Compensatory technique education, Spoke to MD, Spoke to nursing          See flowsheet documentation for full assessment, interventions and recommendations.  Outcome: Progressing  Note: Prognosis: Fair  Problem List: Decreased endurance, Impaired balance, Decreased mobility, Impaired judgement, Decreased safety awareness, Obesity, Decreased skin integrity, Pain, Decreased range of motion, Decreased strength, Orthopedic restrictions  Assessment: limited session completed due to pt's reports of high level of pain. pt needed min to mod assist to perform bed mobility and transfers. pt was unable to complete pregait activities or perform stand pivot transfers. input was needed for mobility technique/safety w/ inconsistent carryover of education received. pt remains at risk for falls due to physical and safety awareness limitations. continued inpatient PT is indicated to decrease fall risk and maximize level of independence.  Barriers to Discharge: Decreased caregiver support, Inaccessible home environment     Rehab Resource Intensity Level, PT: II (Moderate Resource Intensity)    See flowsheet documentation for full assessment.

## 2024-12-30 NOTE — ASSESSMENT & PLAN NOTE
Presents with generalized weakness, failure to thrive, poor appetite, episodes of loose stools.  Denies fever, chills, sweats, constitutional symptoms  Likely multifactorial - chemotherapy, and V/D, poor oral intake  Supportive cares  Medically cleared for discharge.  Pending rehab placement

## 2024-12-30 NOTE — ASSESSMENT & PLAN NOTE
Lab Results   Component Value Date    [id number] 1,528 (H) 12/28/2024     Stop home Vitamin B12 supplementation

## 2024-12-30 NOTE — QUICK NOTE
Received a message from RN stating that patient's bladder scan showed 686 mL.  He is refusing straight cath at this time.  He states that he wants to try to urinate by himself.  He will reconsider straight cath tomorrow.  He is aware of potential complications of urinary retention, including kidney damage that would hamper his vascular surgery plans in early January.

## 2024-12-30 NOTE — CASE MANAGEMENT
[facility] received request for authorization from Care Manager.  Authorization request submitted for: SNF  Facility Name: [facility] NPI: [id number]  Facility MD: Kiley Hernandez NPI: [id number]   Authorization initiated by contacting insurance: [employer]   Via: [employer]   Clinicals submitted via Portal attachment   Pending Reference #: [id number]     Patient not managed through H&CC.     Care Manager notified: Chava MARIE     Updates to authorization status will be noted in chart. Please reach out to CM for updates on any clinical information.

## 2024-12-30 NOTE — CASE MANAGEMENT
Case Management Discharge Planning Note    Patient name Royce Rene  Location S /S -01 MRN [id number]  : 1945 Date 2024       Current Admission Date: 2024  Current Admission Diagnosis:Generalized weakness   Patient Active Problem List    Diagnosis Date Noted Date Diagnosed    Iron deficiency anemia 2024     Generalized weakness 2024     Hyponatremia 2024     Diarrhea 2024     Right ankle pain 2024     Diabetic ulcer of toe of right foot associated with type 2 diabetes mellitus, with necrosis of bone (HCC) 2024     Vitamin B12 deficiency 2024     Mixed hyperlipidemia 2024     Exudative age-related macular degeneration, left eye, with active choroidal neovascularization (HCC) 2024     Facial lesion 2024     Dry gangrene (HCC) 2024     Ischemic pain of foot at rest 10/29/2024     Ulcer of right foot with fat layer exposed (HCC) 10/29/2024     Ulcer of left foot, limited to breakdown of skin (HCC) 10/29/2024     Osteomyelitis (HCC) 10/22/2024     Prevention of chemotherapy-induced neutropenia 2024     Frail elder // vulnerable adult 2024     Elevated liver enzymes 2024     Proctitis 2024     Class 1 obesity due to excess calories with body mass index (BMI) of 33.0 to 33.9 in adult 2023     Electrolyte imbalance 2023     Moderate vascular dementia (HCC) 2023     Ambulatory dysfunction 2022     Peripheral neuropathy 2022     Financial problems 2022     Cancer-related pain 05/10/2022     Palliative care patient 05/10/2022     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) 2022     Embolism and 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 (HCC) 2022     Depression, recurrent (HCC) 2022     Port-A-Cath in place 10/05/2021     PAD (peripheral artery disease) (HCC) 2021     Urinary retention 2021     CAD (coronary artery disease) 07/10/2021      Ischemic leg pain 07/07/2021     Prostate cancer (HCC) 07/07/2021     Ischemic cardiomyopathy 07/01/2021     Chronic combined systolic and diastolic CHF (congestive heart failure) (HCC) 06/22/2021     S/P AVR 06/22/2021     Anemia 06/22/2021       LOS (days): 4  Geometric Mean LOS (GMLOS) (days): 2.5  Days to GMLOS:-1.4     OBJECTIVE:  Risk of Unplanned Readmission Score: 36.85         Current admission status: Inpatient   Preferred Pharmacy:   [facility], [place], [place] - [address]  [address] [facility] [address]  Phone: [phone] Fax: [phone]    [facility] - [place], [place] - [address] [facility]  [address]  [address]  [place] [address]  Phone: [phone] Fax: [phone]    [facility] [place] - [address]  [address]  [address]  [facility] [address]  Phone: [phone] Fax: [phone]    [facility] [place], [place] - [address]  [address] [address]  Phone: [phone] Fax: [phone]    [facility], [place], [place] - [address]  [address] [facility] [address]  Phone: [phone] Fax: [phone]    Primary Care Provider: Anne Chandra MD    Primary Insurance: [employer]  Secondary Insurance:     DISCHARGE DETAILS:    Discharge planning discussed with:: Patient and VM left for Rodrigue Rene (Son)  Freedom of Choice: Yes  Comments - Freedom of Choice: Discussed STR facility options with patient  CM contacted family/caregiver?: Yes (VM left for son Rodrigue)  Were Treatment Team discharge recommendations reviewed with patient/caregiver?: Yes  Did patient/caregiver verbalize understanding of patient care needs?: N/A- going to facility  Were patient/caregiver advised of the risks associated with not following Treatment Team discharge recommendations?: Yes    Contacts  Patient Contacts: Rodrigue Rene (Son)  Relationship to Patient::  Family  Contact Method: Phone  Phone Number: [phone]  Reason/Outcome: Discharge Planning, Referral, Emergency Contact, Continuity of Care    Requested Home Health Care         Is the patient interested in C at discharge?: No    DME Referral Provided  Referral made for DME?: No    Other Referral/Resources/Interventions Provided:  Interventions: Short Term Rehab         Treatment Team Recommendation: Short Term Rehab  Discharge Destination Plan:: Short Term Rehab                                IMM Given (Date):: 12/30/24  IMM Given to:: Patient        IMM reviewed with patient.  Patient agrees with discharge determination.                  CM met with patient at bedside and reviewed the available STR facilities.      CM discussed with patient that one of the barriers to STR is his active chemo treatments.  SLIM had also discussed this with patient and he understands that he will need to put his Chemo on hold while in STR.  SLIM also discussed with hemonc and will document this in their note today.    Of the available options patient selected [facility] as he would like to stay close to home.    CM reserved with them in [place] and requested clinical information needed to obtain prior auth.    Once obtained CM tasked prior auth to  DC support team.    CM called patient's son at his request to review the above however CM was only able to leave a voicemail requesting a call back.    CM department will continue to follow to assist with discharge coordination.

## 2024-12-30 NOTE — PLAN OF CARE
Problem: Prexisting or High Potential for Compromised Skin Integrity  Goal: Skin integrity is maintained or improved  Description: INTERVENTIONS:  - Identify patients at risk for skin breakdown  - Assess and monitor skin integrity  - Assess and monitor nutrition and hydration status  - Monitor labs   - Assess for incontinence   - Turn and reposition patient  - Assist with mobility/ambulation  - Relieve pressure over bony prominences  - Avoid friction and shearing  - Provide appropriate hygiene as needed including keeping skin clean and dry  - Evaluate need for skin moisturizer/barrier cream  - Collaborate with interdisciplinary team   - Patient/family teaching  - Consider wound care consult   Outcome: Progressing     Problem: Nutrition/Hydration-ADULT  Goal: Nutrient/Hydration intake appropriate for improving, restoring or maintaining nutritional needs  Description: Monitor and assess patient's nutrition/hydration status for malnutrition. Collaborate with interdisciplinary team and initiate plan and interventions as ordered.  Monitor patient's weight and dietary intake as ordered or per policy. Utilize nutrition screening tool and intervene as necessary. Determine patient's food preferences and provide high-protein, high-caloric foods as appropriate.     INTERVENTIONS:  - Monitor oral intake, urinary output, labs, and treatment plans  - Assess nutrition and hydration status and recommend course of action  - Evaluate amount of meals eaten  - Assist patient with eating if necessary   - Allow adequate time for meals  - Recommend/ encourage appropriate diets, oral nutritional supplements, and vitamin/mineral supplements  - Order, calculate, and assess calorie counts as needed  - Recommend, monitor, and adjust tube feedings and TPN/PPN based on assessed needs  - Assess need for intravenous fluids  - Provide specific nutrition/hydration education as appropriate  - Include patient/family/caregiver in decisions related to  nutrition  Outcome: Progressing     Problem: PAIN - ADULT  Goal: Verbalizes/displays adequate comfort level or baseline comfort level  Description: Interventions:  - Encourage patient to monitor pain and request assistance  - Assess pain using appropriate pain scale  - Administer analgesics based on type and severity of pain and evaluate response  - Implement non-pharmacological measures as appropriate and evaluate response  - Consider cultural and social influences on pain and pain management  - Notify physician/advanced practitioner if interventions unsuccessful or patient reports new pain  Outcome: Progressing     Problem: INFECTION - ADULT  Goal: Absence or prevention of progression during hospitalization  Description: INTERVENTIONS:  - Assess and monitor for signs and symptoms of infection  - Monitor lab/diagnostic results  - Monitor all insertion sites, i.e. indwelling lines, tubes, and drains  - Monitor endotracheal if appropriate and nasal secretions for changes in amount and color  - [place] appropriate cooling/warming therapies per order  - Administer medications as ordered  - Instruct and encourage patient and family to use good hand hygiene technique  - Identify and instruct in appropriate isolation precautions for identified infection/condition  Outcome: Progressing  Goal: Absence of fever/infection during neutropenic period  Description: INTERVENTIONS:  - Monitor WBC    Outcome: Progressing     Problem: SAFETY ADULT  Goal: Patient will remain free of falls  Description: INTERVENTIONS:  - Educate patient/family on patient safety including physical limitations  - Instruct patient to call for assistance with activity   - Consult OT/PT to assist with strengthening/mobility   - Keep Call bell within reach  - Keep bed low and locked with side rails adjusted as appropriate  - Keep care items and personal belongings within reach  - Initiate and maintain comfort rounds  - Make Fall Risk Sign visible to  staff  - Offer Toileting every 2 Hours, in advance of need  - Initiate/Maintain bed/chair alarm  - Obtain necessary fall risk management equipment: 2  - Apply yellow socks and bracelet for high fall risk patients  - Consider moving patient to room near nurses station  Outcome: Progressing  Goal: Maintain or return to baseline ADL function  Description: INTERVENTIONS:  -  Assess patient's ability to carry out ADLs; assess patient's baseline for ADL function and identify physical deficits which impact ability to perform ADLs (bathing, care of mouth/teeth, toileting, grooming, dressing, etc.)  - Assess/evaluate cause of self-care deficits   - Assess range of motion  - Assess patient's mobility; develop plan if impaired  - Assess patient's need for assistive devices and provide as appropriate  - Encourage maximum independence but intervene and supervise when necessary  - Involve family in performance of ADLs  - Assess for home care needs following discharge   - Consider OT consult to assist with ADL evaluation and planning for discharge  - Provide patient education as appropriate  Outcome: Progressing  Goal: Maintains/Returns to pre admission functional level  Description: INTERVENTIONS:  - Perform AM-PAC 6 Click Basic Mobility/ Daily Activity assessment daily.  - Set and communicate daily mobility goal to care team and patient/family/caregiver.   - Collaborate with rehabilitation services on mobility goals if consulted  - Perform Range of Motion 2 times a day.  - Reposition patient every 2 hours.  - Dangle patient 2 times a day  - Stand patient 2 times a day  - Ambulate patient 2 times a day  - Out of bed to chair 2 times a day   - Out of bed for meals 2 times a day  - Out of bed for toileting  - Record patient progress and toleration of activity level   Outcome: Progressing     Problem: DISCHARGE PLANNING  Goal: Discharge to home or other facility with appropriate resources  Description: INTERVENTIONS:  - Identify  barriers to discharge w/patient and caregiver  - Arrange for needed discharge resources and transportation as appropriate  - Identify discharge learning needs (meds, wound care, etc.)  - Arrange for interpretive services to assist at discharge as needed  - Refer to Case Management Department for coordinating discharge planning if the patient needs post-hospital services based on physician/advanced practitioner order or complex needs related to functional status, cognitive ability, or social support system  Outcome: Progressing     Problem: Knowledge Deficit  Goal: Patient/family/caregiver demonstrates understanding of disease process, treatment plan, medications, and discharge instructions  Description: Complete learning assessment and assess knowledge base.  Interventions:  - Provide teaching at level of understanding  - Provide teaching via preferred learning methods  Outcome: Progressing     Problem: GASTROINTESTINAL - ADULT  Goal: Minimal or absence of nausea and/or vomiting  Description: INTERVENTIONS:  - Administer IV fluids if ordered to ensure adequate hydration  - Maintain NPO status until nausea and vomiting are resolved  - Nasogastric tube if ordered  - Administer ordered antiemetic medications as needed  - Provide nonpharmacologic comfort measures as appropriate  - Advance diet as tolerated, if ordered  - Consider nutrition services referral to assist patient with adequate nutrition and appropriate food choices  Outcome: Progressing  Goal: Maintains or returns to baseline bowel function  Description: INTERVENTIONS:  - Assess bowel function  - Encourage oral fluids to ensure adequate hydration  - Administer IV fluids if ordered to ensure adequate hydration  - Administer ordered medications as needed  - Encourage mobilization and activity  - Consider nutritional services referral to assist patient with adequate nutrition and appropriate food choices  Outcome: Progressing

## 2024-12-31 PROBLEM — M10.9 GOUT: Status: ACTIVE | Noted: 2024-12-31

## 2024-12-31 PROBLEM — M10.9 GOUT: Status: RESOLVED | Noted: 2024-12-31 | Resolved: 2024-12-31

## 2024-12-31 PROBLEM — E79.0 HYPERURICEMIA: Status: ACTIVE | Noted: 2024-12-31

## 2024-12-31 LAB
ANION GAP SERPL CALCULATED.3IONS-SCNC: 9 MMOL/L (ref 4–13)
BUN SERPL-MCNC: 13 MG/DL (ref 5–25)
CA-I BLD-SCNC: 1.06 MMOL/L (ref 1.12–1.32)
CALCIUM SERPL-MCNC: 7.9 MG/DL (ref 8.4–10.2)
CHLORIDE SERPL-SCNC: 111 MMOL/L (ref 96–108)
CO2 SERPL-SCNC: 18 MMOL/L (ref 21–32)
CREAT SERPL-MCNC: 0.73 MG/DL (ref 0.6–1.3)
ERYTHROCYTE [DISTWIDTH] IN BLOOD BY AUTOMATED COUNT: 14.9 % (ref 11.6–15.1)
GFR SERPL CREATININE-BSD FRML MDRD: 88 ML/MIN/1.73SQ M
GLUCOSE SERPL-MCNC: 106 MG/DL (ref 65–140)
HCT VFR BLD AUTO: 28.3 % (ref 36.5–49.3)
HGB BLD-MCNC: 8.6 G/DL (ref 12–17)
MCH RBC QN AUTO: 29.6 PG (ref 26.8–34.3)
MCHC RBC AUTO-ENTMCNC: 30.4 G/DL (ref 31.4–37.4)
MCV RBC AUTO: 97 FL (ref 82–98)
PLATELET # BLD AUTO: 368 THOUSANDS/UL (ref 149–390)
PMV BLD AUTO: 10 FL (ref 8.9–12.7)
POTASSIUM SERPL-SCNC: 4.1 MMOL/L (ref 3.5–5.3)
RBC # BLD AUTO: 2.91 MILLION/UL (ref 3.88–5.62)
SODIUM SERPL-SCNC: 138 MMOL/L (ref 135–147)
WBC # BLD AUTO: 14.77 THOUSAND/UL (ref 4.31–10.16)

## 2024-12-31 PROCEDURE — 80048 BASIC METABOLIC PNL TOTAL CA: CPT | Performed by: INTERNAL MEDICINE

## 2024-12-31 PROCEDURE — 85027 COMPLETE CBC AUTOMATED: CPT | Performed by: INTERNAL MEDICINE

## 2024-12-31 PROCEDURE — 82330 ASSAY OF CALCIUM: CPT | Performed by: INTERNAL MEDICINE

## 2024-12-31 PROCEDURE — 99232 SBSQ HOSP IP/OBS MODERATE 35: CPT | Performed by: INTERNAL MEDICINE

## 2024-12-31 RX ORDER — METOPROLOL SUCCINATE 25 MG/1
25 TABLET, EXTENDED RELEASE ORAL DAILY
Status: CANCELLED | OUTPATIENT
Start: 2025-01-01

## 2024-12-31 RX ORDER — POTASSIUM CHLORIDE 1500 MG/1
20 TABLET, EXTENDED RELEASE ORAL DAILY
Status: CANCELLED | OUTPATIENT
Start: 2025-01-01

## 2024-12-31 RX ORDER — ALLOPURINOL 100 MG/1
100 TABLET ORAL DAILY
Status: CANCELLED | OUTPATIENT
Start: 2025-01-01

## 2024-12-31 RX ORDER — ASPIRIN 81 MG/1
81 TABLET, CHEWABLE ORAL DAILY
Status: CANCELLED | OUTPATIENT
Start: 2025-01-01

## 2024-12-31 RX ORDER — LOSARTAN POTASSIUM 25 MG/1
12.5 TABLET ORAL DAILY
Status: CANCELLED | OUTPATIENT
Start: 2025-01-01

## 2024-12-31 RX ORDER — ATORVASTATIN CALCIUM 40 MG/1
80 TABLET, FILM COATED ORAL
Status: CANCELLED | OUTPATIENT
Start: 2025-01-01

## 2024-12-31 RX ORDER — OXYCODONE HYDROCHLORIDE 5 MG/1
5 TABLET ORAL 2 TIMES DAILY PRN
Refills: 0 | Status: CANCELLED | OUTPATIENT
Start: 2024-12-31

## 2024-12-31 RX ORDER — LOPERAMIDE HYDROCHLORIDE 2 MG/1
2 CAPSULE ORAL 4 TIMES DAILY PRN
Status: CANCELLED | OUTPATIENT
Start: 2024-12-31

## 2024-12-31 RX ORDER — ONDANSETRON 2 MG/ML
4 INJECTION INTRAMUSCULAR; INTRAVENOUS EVERY 6 HOURS PRN
Status: CANCELLED | OUTPATIENT
Start: 2024-12-31

## 2024-12-31 RX ORDER — PREDNISONE 5 MG/1
5 TABLET ORAL 2 TIMES DAILY WITH MEALS
Status: CANCELLED | OUTPATIENT
Start: 2025-01-01

## 2024-12-31 RX ORDER — GABAPENTIN 300 MG/1
600 CAPSULE ORAL 2 TIMES DAILY
Status: CANCELLED | OUTPATIENT
Start: 2025-01-01

## 2024-12-31 RX ORDER — FERROUS SULFATE 325(65) MG
325 TABLET ORAL DAILY
Status: CANCELLED | OUTPATIENT
Start: 2025-01-01

## 2024-12-31 RX ORDER — ALLOPURINOL 100 MG/1
100 TABLET ORAL DAILY
Status: DISCONTINUED | OUTPATIENT
Start: 2024-12-31 | End: 2025-01-01 | Stop reason: HOSPADM

## 2024-12-31 RX ORDER — CLOPIDOGREL BISULFATE 75 MG/1
75 TABLET ORAL DAILY
Status: CANCELLED | OUTPATIENT
Start: 2025-01-01

## 2024-12-31 RX ORDER — MUSCLE RUB CREAM 100; 150 MG/G; MG/G
CREAM TOPICAL 4 TIMES DAILY PRN
Status: CANCELLED | OUTPATIENT
Start: 2024-12-31

## 2024-12-31 RX ORDER — METHOCARBAMOL 500 MG/1
500 TABLET, FILM COATED ORAL 3 TIMES DAILY
Status: CANCELLED | OUTPATIENT
Start: 2024-12-31

## 2024-12-31 RX ORDER — TAMSULOSIN HYDROCHLORIDE 0.4 MG/1
0.4 CAPSULE ORAL
Status: CANCELLED | OUTPATIENT
Start: 2025-01-01

## 2024-12-31 RX ORDER — FUROSEMIDE 40 MG/1
40 TABLET ORAL DAILY
Status: CANCELLED | OUTPATIENT
Start: 2025-01-01

## 2024-12-31 RX ORDER — ENOXAPARIN SODIUM 100 MG/ML
40 INJECTION SUBCUTANEOUS DAILY
Status: CANCELLED | OUTPATIENT
Start: 2025-01-01

## 2024-12-31 RX ORDER — PENTOXIFYLLINE 400 MG/1
400 TABLET, EXTENDED RELEASE ORAL
Status: CANCELLED | OUTPATIENT
Start: 2025-01-01

## 2024-12-31 RX ORDER — FINASTERIDE 5 MG/1
5 TABLET, FILM COATED ORAL DAILY
Status: CANCELLED | OUTPATIENT
Start: 2025-01-01

## 2024-12-31 RX ORDER — FAMOTIDINE 20 MG/1
20 TABLET, FILM COATED ORAL DAILY
Status: CANCELLED | OUTPATIENT
Start: 2025-01-01

## 2024-12-31 RX ORDER — EZETIMIBE 10 MG/1
10 TABLET ORAL DAILY
Status: CANCELLED | OUTPATIENT
Start: 2025-01-01

## 2024-12-31 RX ORDER — ACETAMINOPHEN 325 MG/1
650 TABLET ORAL EVERY 6 HOURS PRN
Status: CANCELLED | OUTPATIENT
Start: 2024-12-31

## 2024-12-31 RX ORDER — CALCIUM GLUCONATE 20 MG/ML
1 INJECTION, SOLUTION INTRAVENOUS ONCE
Status: COMPLETED | OUTPATIENT
Start: 2024-12-31 | End: 2024-12-31

## 2024-12-31 RX ADMIN — CLOPIDOGREL BISULFATE 75 MG: 75 TABLET ORAL at 08:08

## 2024-12-31 RX ADMIN — ENOXAPARIN SODIUM 40 MG: 40 INJECTION SUBCUTANEOUS at 08:07

## 2024-12-31 RX ADMIN — PREDNISONE 5 MG: 10 TABLET ORAL at 17:03

## 2024-12-31 RX ADMIN — PENTOXIFYLLINE 400 MG: 400 TABLET, EXTENDED RELEASE ORAL at 17:03

## 2024-12-31 RX ADMIN — PENTOXIFYLLINE 400 MG: 400 TABLET, EXTENDED RELEASE ORAL at 08:08

## 2024-12-31 RX ADMIN — EZETIMIBE 10 MG: 10 TABLET ORAL at 08:08

## 2024-12-31 RX ADMIN — CALCIUM GLUCONATE 1 G: 20 INJECTION, SOLUTION INTRAVENOUS at 06:16

## 2024-12-31 RX ADMIN — FAMOTIDINE 20 MG: 20 TABLET, FILM COATED ORAL at 08:07

## 2024-12-31 RX ADMIN — EMPAGLIFLOZIN 10 MG: 10 TABLET, FILM COATED ORAL at 08:09

## 2024-12-31 RX ADMIN — METHOCARBAMOL TABLETS 500 MG: 500 TABLET, COATED ORAL at 08:07

## 2024-12-31 RX ADMIN — GABAPENTIN 600 MG: 300 CAPSULE ORAL at 17:03

## 2024-12-31 RX ADMIN — POTASSIUM CHLORIDE 20 MEQ: 1500 TABLET, EXTENDED RELEASE ORAL at 08:08

## 2024-12-31 RX ADMIN — DICLOFENAC SODIUM 2 G: 10 GEL TOPICAL at 22:22

## 2024-12-31 RX ADMIN — FERROUS SULFATE TAB 325 MG (65 MG ELEMENTAL FE) 325 MG: 325 (65 FE) TAB at 08:08

## 2024-12-31 RX ADMIN — METHOCARBAMOL TABLETS 500 MG: 500 TABLET, COATED ORAL at 22:21

## 2024-12-31 RX ADMIN — TAMSULOSIN HYDROCHLORIDE 0.4 MG: 0.4 CAPSULE ORAL at 17:03

## 2024-12-31 RX ADMIN — ATORVASTATIN CALCIUM 80 MG: 40 TABLET, FILM COATED ORAL at 17:03

## 2024-12-31 RX ADMIN — DICLOFENAC SODIUM 2 G: 10 GEL TOPICAL at 08:09

## 2024-12-31 RX ADMIN — FINASTERIDE 5 MG: 5 TABLET, FILM COATED ORAL at 08:09

## 2024-12-31 RX ADMIN — METHOCARBAMOL TABLETS 500 MG: 500 TABLET, COATED ORAL at 17:05

## 2024-12-31 RX ADMIN — GABAPENTIN 600 MG: 300 CAPSULE ORAL at 08:08

## 2024-12-31 RX ADMIN — PREDNISONE 5 MG: 10 TABLET ORAL at 08:08

## 2024-12-31 RX ADMIN — PENTOXIFYLLINE 400 MG: 400 TABLET, EXTENDED RELEASE ORAL at 11:55

## 2024-12-31 RX ADMIN — ASPIRIN 81 MG CHEWABLE TABLET 81 MG: 81 TABLET CHEWABLE at 08:08

## 2024-12-31 RX ADMIN — ALLOPURINOL 100 MG: 100 TABLET ORAL at 11:55

## 2024-12-31 RX ADMIN — DICLOFENAC SODIUM 2 G: 10 GEL TOPICAL at 11:57

## 2024-12-31 NOTE — CASE MANAGEMENT
[facility] has received APPROVED authorization.  Insurance: [employer]   Auth obtained via Insurance Rep: Grace P#: [phone]  Authorization received for: SNF  Facility: [facility]   Authorization #: [id number]   Start of Care: 12/31  Next Review Date: 01/03  Continued Stay Care Coordinator: Viri PERES#:  [phone]  Submit next review to F#: [phone]    Care Manager notified: Chaav Ballesteros    Please reach out to  for updates on any clinical information.

## 2024-12-31 NOTE — PLAN OF CARE
Problem: Prexisting or High Potential for Compromised Skin Integrity  Goal: Skin integrity is maintained or improved  Description: INTERVENTIONS:  - Identify patients at risk for skin breakdown  - Assess and monitor skin integrity  - Assess and monitor nutrition and hydration status  - Monitor labs   - Assess for incontinence   - Turn and reposition patient  - Assist with mobility/ambulation  - Relieve pressure over bony prominences  - Avoid friction and shearing  - Provide appropriate hygiene as needed including keeping skin clean and dry  - Evaluate need for skin moisturizer/barrier cream  - Collaborate with interdisciplinary team   - Patient/family teaching  - Consider wound care consult   12/30/2024 1903 by Disha Roblero  Outcome: Progressing  12/30/2024 1903 by Disha Roblero  Outcome: Progressing

## 2024-12-31 NOTE — ASSESSMENT & PLAN NOTE
Lab Results   Component Value Date    SODIUM 138 12/31/2024    K 4.1 12/31/2024    MG 1.9 12/29/2024    PHOS 2.3 12/29/2024    CAIONIZED 1.06 (L) 12/31/2024     With multiple electrolyte imbalances upon arrival   Replete and monitor

## 2024-12-31 NOTE — ASSESSMENT & PLAN NOTE
Lab Results   Component Value Date    SODIUM 138 12/31/2024     Na 132 on arrival.  Hypovolemic hyponatremia.  Resolved  Likely multifactorial with dehydration contributing  Improved with IV fluids and PO intake  Resolved

## 2024-12-31 NOTE — QUICK NOTE
Left detailed message for patient's son, Rodrigue, over the phone regarding improvement in urinary retention as well as new plan for hospital to hospital transfer from [facility] to [facility] for vascular surgery procedure on 1/2.      Per PACS, there are no beds available at [facility]. Accepted by [facility] hospitalist Dr. Partida.

## 2024-12-31 NOTE — PLAN OF CARE
Problem: Prexisting or High Potential for Compromised Skin Integrity  Goal: Skin integrity is maintained or improved  Description: INTERVENTIONS:  - Identify patients at risk for skin breakdown  - Assess and monitor skin integrity  - Assess and monitor nutrition and hydration status  - Monitor labs   - Assess for incontinence   - Turn and reposition patient  - Assist with mobility/ambulation  - Relieve pressure over bony prominences  - Avoid friction and shearing  - Provide appropriate hygiene as needed including keeping skin clean and dry  - Evaluate need for skin moisturizer/barrier cream  - Collaborate with interdisciplinary team   - Patient/family teaching  - Consider wound care consult   12/31/2024 0800 by Tia Nunes LPN  Outcome: Progressing  12/31/2024 0759 by Tia Nunes LPN  Outcome: Progressing     Problem: Nutrition/Hydration-ADULT  Goal: Nutrient/Hydration intake appropriate for improving, restoring or maintaining nutritional needs  Description: Monitor and assess patient's nutrition/hydration status for malnutrition. Collaborate with interdisciplinary team and initiate plan and interventions as ordered.  Monitor patient's weight and dietary intake as ordered or per policy. Utilize nutrition screening tool and intervene as necessary. Determine patient's food preferences and provide high-protein, high-caloric foods as appropriate.     INTERVENTIONS:  - Monitor oral intake, urinary output, labs, and treatment plans  - Assess nutrition and hydration status and recommend course of action  - Evaluate amount of meals eaten  - Assist patient with eating if necessary   - Allow adequate time for meals  - Recommend/ encourage appropriate diets, oral nutritional supplements, and vitamin/mineral supplements  - Order, calculate, and assess calorie counts as needed  - Recommend, monitor, and adjust tube feedings and TPN/PPN based on assessed needs  - Assess need for intravenous fluids  - Provide  specific nutrition/hydration education as appropriate  - Include patient/family/caregiver in decisions related to nutrition  12/31/2024 0800 by Tia Nunes LPN  Outcome: Progressing  12/31/2024 0759 by Tia Nunes LPN  Outcome: Progressing     Problem: PAIN - ADULT  Goal: Verbalizes/displays adequate comfort level or baseline comfort level  Description: Interventions:  - Encourage patient to monitor pain and request assistance  - Assess pain using appropriate pain scale  - Administer analgesics based on type and severity of pain and evaluate response  - Implement non-pharmacological measures as appropriate and evaluate response  - Consider cultural and social influences on pain and pain management  - Notify physician/advanced practitioner if interventions unsuccessful or patient reports new pain  12/31/2024 0800 by Tia Nunes LPN  Outcome: Progressing  12/31/2024 0759 by Tia Nunes LPN  Outcome: Progressing     Problem: INFECTION - ADULT  Goal: Absence or prevention of progression during hospitalization  Description: INTERVENTIONS:  - Assess and monitor for signs and symptoms of infection  - Monitor lab/diagnostic results  - Monitor all insertion sites, i.e. indwelling lines, tubes, and drains  - Monitor endotracheal if appropriate and nasal secretions for changes in amount and color  - [place] appropriate cooling/warming therapies per order  - Administer medications as ordered  - Instruct and encourage patient and family to use good hand hygiene technique  - Identify and instruct in appropriate isolation precautions for identified infection/condition  12/31/2024 0800 by Tia Nunes LPN  Outcome: Progressing  12/31/2024 0759 by Tia Nunes LPN  Outcome: Progressing  Goal: Absence of fever/infection during neutropenic period  Description: INTERVENTIONS:  - Monitor WBC    12/31/2024 0800 by Tia Nunes LPN  Outcome: Progressing  12/31/2024 0759 by Tia Nunes  LPN  Outcome: Progressing     Problem: SAFETY ADULT  Goal: Patient will remain free of falls  Description: INTERVENTIONS:  - Educate patient/family on patient safety including physical limitations  - Instruct patient to call for assistance with activity   - Consult OT/PT to assist with strengthening/mobility   - Keep Call bell within reach  - Keep bed low and locked with side rails adjusted as appropriate  - Keep care items and personal belongings within reach  - Initiate and maintain comfort rounds  - Make Fall Risk Sign visible to staff  - Offer Toileting every 2 Hours, in advance of need  - Initiate/Maintain bed/chair alarm  - Obtain necessary fall risk management equipment: 2  - Apply yellow socks and bracelet for high fall risk patients  - Consider moving patient to room near nurses station  12/31/2024 0800 by Tia Nunes LPN  Outcome: Progressing  12/31/2024 0759 by Tia Nunes LPN  Outcome: Progressing  Goal: Maintain or return to baseline ADL function  Description: INTERVENTIONS:  -  Assess patient's ability to carry out ADLs; assess patient's baseline for ADL function and identify physical deficits which impact ability to perform ADLs (bathing, care of mouth/teeth, toileting, grooming, dressing, etc.)  - Assess/evaluate cause of self-care deficits   - Assess range of motion  - Assess patient's mobility; develop plan if impaired  - Assess patient's need for assistive devices and provide as appropriate  - Encourage maximum independence but intervene and supervise when necessary  - Involve family in performance of ADLs  - Assess for home care needs following discharge   - Consider OT consult to assist with ADL evaluation and planning for discharge  - Provide patient education as appropriate  12/31/2024 0800 by Tia Nunes LPN  Outcome: Progressing  12/31/2024 0759 by Tia Nunes LPN  Outcome: Progressing  Goal: Maintains/Returns to pre admission functional level  Description:  INTERVENTIONS:  - Perform AM-PAC 6 Click Basic Mobility/ Daily Activity assessment daily.  - Set and communicate daily mobility goal to care team and patient/family/caregiver.   - Collaborate with rehabilitation services on mobility goals if consulted  - Perform Range of Motion 2 times a day.  - Reposition patient every 2 hours.  - Dangle patient 2 times a day  - Stand patient 2 times a day  - Ambulate patient 2 times a day  - Out of bed to chair 2 times a day   - Out of bed for meals 2 times a day  - Out of bed for toileting  - Record patient progress and toleration of activity level   12/31/2024 0800 by Tia Nunes LPN  Outcome: Progressing  12/31/2024 0759 by Tia Nunes LPN  Outcome: Progressing     Problem: DISCHARGE PLANNING  Goal: Discharge to home or other facility with appropriate resources  Description: INTERVENTIONS:  - Identify barriers to discharge w/patient and caregiver  - Arrange for needed discharge resources and transportation as appropriate  - Identify discharge learning needs (meds, wound care, etc.)  - Arrange for interpretive services to assist at discharge as needed  - Refer to Case Management Department for coordinating discharge planning if the patient needs post-hospital services based on physician/advanced practitioner order or complex needs related to functional status, cognitive ability, or social support system  12/31/2024 0800 by Tia Nunes LPN  Outcome: Progressing  12/31/2024 0759 by Tia Nunes LPN  Outcome: Progressing     Problem: Knowledge Deficit  Goal: Patient/family/caregiver demonstrates understanding of disease process, treatment plan, medications, and discharge instructions  Description: Complete learning assessment and assess knowledge base.  Interventions:  - Provide teaching at level of understanding  - Provide teaching via preferred learning methods  12/31/2024 0800 by Tia Nunes LPN  Outcome: Progressing  12/31/2024 0759 by Tia  Hillegas, LPN  Outcome: Progressing     Problem: GASTROINTESTINAL - ADULT  Goal: Minimal or absence of nausea and/or vomiting  Description: INTERVENTIONS:  - Administer IV fluids if ordered to ensure adequate hydration  - Maintain NPO status until nausea and vomiting are resolved  - Nasogastric tube if ordered  - Administer ordered antiemetic medications as needed  - Provide nonpharmacologic comfort measures as appropriate  - Advance diet as tolerated, if ordered  - Consider nutrition services referral to assist patient with adequate nutrition and appropriate food choices  12/31/2024 0800 by Tia Nunes LPN  Outcome: Progressing  12/31/2024 0759 by Tia Nunes LPN  Outcome: Progressing  Goal: Maintains or returns to baseline bowel function  Description: INTERVENTIONS:  - Assess bowel function  - Encourage oral fluids to ensure adequate hydration  - Administer IV fluids if ordered to ensure adequate hydration  - Administer ordered medications as needed  - Encourage mobilization and activity  - Consider nutritional services referral to assist patient with adequate nutrition and appropriate food choices  12/31/2024 0800 by Tia Nunes LPN  Outcome: Progressing  12/31/2024 0759 by Tia Nunes LPN  Outcome: Progressing

## 2024-12-31 NOTE — ASSESSMENT & PLAN NOTE
Lab Results   Component Value Date    URICACID 11.5 (H) 12/26/2024     Started on allopurinol 100 mg daily

## 2024-12-31 NOTE — PROGRESS NOTES
Patient bladder scanned for 536 mL at 2100 after having an unmeasured urine occurrence. Patient refusing to use urinal and also refusing to be straight cathed according to protocol. Team aware.

## 2024-12-31 NOTE — CASE MANAGEMENT
Case Management Discharge Planning Note    Patient name Royce Rene  Location S /S -01 MRN [id number]  : 1945 Date 2024       Current Admission Date: 2024  Current Admission Diagnosis:Generalized weakness   Patient Active Problem List    Diagnosis Date Noted Date Diagnosed    Hyperuricemia 2024     Iron deficiency anemia 2024     Generalized weakness 2024     Hyponatremia 2024     Diarrhea 2024     Right ankle pain 2024     Diabetic ulcer of toe of right foot associated with type 2 diabetes mellitus, with necrosis of bone (HCC) 2024     Vitamin B12 deficiency 2024     Mixed hyperlipidemia 2024     Exudative age-related macular degeneration, left eye, with active choroidal neovascularization (HCC) 2024     Facial lesion 2024     Dry gangrene (HCC) 2024     Ischemic pain of foot at rest 10/29/2024     Ulcer of right foot with fat layer exposed (HCC) 10/29/2024     Ulcer of left foot, limited to breakdown of skin (HCC) 10/29/2024     Osteomyelitis (HCC) 10/22/2024     Prevention of chemotherapy-induced neutropenia 2024     Frail elder // vulnerable adult 2024     Elevated liver enzymes 2024     Proctitis 2024     Class 1 obesity due to excess calories with body mass index (BMI) of 33.0 to 33.9 in adult 2023     Electrolyte imbalance 2023     Moderate vascular dementia (HCC) 2023     Ambulatory dysfunction 2022     Peripheral neuropathy 2022     Financial problems 2022     Cancer-related pain 05/10/2022     Palliative care patient 05/10/2022     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) 2022     Embolism and 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 (HCC) 2022     Depression, recurrent (HCC) 2022     Port-A-Cath in place 10/05/2021     PAD (peripheral artery disease) (HCC) 2021     Urinary retention 2021     CAD  (coronary artery disease) 07/10/2021     Ischemic leg pain 07/07/2021     Prostate cancer (HCC) 07/07/2021     Ischemic cardiomyopathy 07/01/2021     Chronic combined systolic and diastolic CHF (congestive heart failure) (HCC) 06/22/2021     S/P AVR 06/22/2021     Anemia 06/22/2021       LOS (days): 5  Geometric Mean LOS (GMLOS) (days): 2.5  Days to GMLOS:-2.4     OBJECTIVE:  Risk of Unplanned Readmission Score: 37.55         Current admission status: Inpatient   Preferred Pharmacy:   [facility], [place], [place] - [address] [facility] [address] [facility]  [address] [address]  Phone: [phone] Fax: [phone]    [facility] - [place], [place] - [address] [facility]  [address]  [address]  [place] [address]  Phone: [phone] Fax: [phone]    [facility] [place] - [address]  [address]  [address]  [facility] [address]  Phone: [phone] Fax: [phone]    [place], [place] - [address] [facility]  [address] [facility] [address] [facility] [address]  Phone: [phone] Fax: [phone]    [facility], [place], [place] - [address] [facility]  [address] [facility] [address]  Phone: [phone] Fax: [phone]    Primary Care Provider: Anne Chandra MD    Primary Insurance: [employer]  Secondary Insurance:     DISCHARGE DETAILS:                                          Other Referral/Resources/Interventions Provided:  Interventions: Short Term Rehab       CM received prior auth information from  DC support team.  This was provided to [facility] admissions coordinator.    CM then updated [facility], who at this time informed CM that patient had a previous procedure set up at [facility] on 1/2 and they are coordinating for patient to transfer to [facility] to have this done prior to DC to [facility].    CM updated [facility] admissions team on the above.  She will follow patient once he is over at [facility].    CM department notified  by provider that patient is requiring transfer to [facility] for vascular surgery.    CM spoke with primary nurse regarding patient's requirements for transport.  CMN was completed for Transport once a bed is available and PACS secures a transport time.      Nursing and provider team will keep patient updated on transfer and transportation updates.

## 2024-12-31 NOTE — PLAN OF CARE
Problem: Prexisting or High Potential for Compromised Skin Integrity  Goal: Skin integrity is maintained or improved  Description: INTERVENTIONS:  - Identify patients at risk for skin breakdown  - Assess and monitor skin integrity  - Assess and monitor nutrition and hydration status  - Monitor labs   - Assess for incontinence   - Turn and reposition patient  - Assist with mobility/ambulation  - Relieve pressure over bony prominences  - Avoid friction and shearing  - Provide appropriate hygiene as needed including keeping skin clean and dry  - Evaluate need for skin moisturizer/barrier cream  - Collaborate with interdisciplinary team   - Patient/family teaching  - Consider wound care consult   Outcome: Progressing     Problem: Nutrition/Hydration-ADULT  Goal: Nutrient/Hydration intake appropriate for improving, restoring or maintaining nutritional needs  Description: Monitor and assess patient's nutrition/hydration status for malnutrition. Collaborate with interdisciplinary team and initiate plan and interventions as ordered.  Monitor patient's weight and dietary intake as ordered or per policy. Utilize nutrition screening tool and intervene as necessary. Determine patient's food preferences and provide high-protein, high-caloric foods as appropriate.     INTERVENTIONS:  - Monitor oral intake, urinary output, labs, and treatment plans  - Assess nutrition and hydration status and recommend course of action  - Evaluate amount of meals eaten  - Assist patient with eating if necessary   - Allow adequate time for meals  - Recommend/ encourage appropriate diets, oral nutritional supplements, and vitamin/mineral supplements  - Order, calculate, and assess calorie counts as needed  - Recommend, monitor, and adjust tube feedings and TPN/PPN based on assessed needs  - Assess need for intravenous fluids  - Provide specific nutrition/hydration education as appropriate  - Include patient/family/caregiver in decisions related to  nutrition  Outcome: Progressing     Problem: PAIN - ADULT  Goal: Verbalizes/displays adequate comfort level or baseline comfort level  Description: Interventions:  - Encourage patient to monitor pain and request assistance  - Assess pain using appropriate pain scale  - Administer analgesics based on type and severity of pain and evaluate response  - Implement non-pharmacological measures as appropriate and evaluate response  - Consider cultural and social influences on pain and pain management  - Notify physician/advanced practitioner if interventions unsuccessful or patient reports new pain  Outcome: Progressing     Problem: INFECTION - ADULT  Goal: Absence or prevention of progression during hospitalization  Description: INTERVENTIONS:  - Assess and monitor for signs and symptoms of infection  - Monitor lab/diagnostic results  - Monitor all insertion sites, i.e. indwelling lines, tubes, and drains  - Monitor endotracheal if appropriate and nasal secretions for changes in amount and color  - [place] appropriate cooling/warming therapies per order  - Administer medications as ordered  - Instruct and encourage patient and family to use good hand hygiene technique  - Identify and instruct in appropriate isolation precautions for identified infection/condition  Outcome: Progressing  Goal: Absence of fever/infection during neutropenic period  Description: INTERVENTIONS:  - Monitor WBC    Outcome: Progressing     Problem: SAFETY ADULT  Goal: Patient will remain free of falls  Description: INTERVENTIONS:  - Educate patient/family on patient safety including physical limitations  - Instruct patient to call for assistance with activity   - Consult OT/PT to assist with strengthening/mobility   - Keep Call bell within reach  - Keep bed low and locked with side rails adjusted as appropriate  - Keep care items and personal belongings within reach  - Initiate and maintain comfort rounds  - Make Fall Risk Sign visible to  staff  - Offer Toileting every 2 Hours, in advance of need  - Initiate/Maintain bed/chair alarm  - Obtain necessary fall risk management equipment: 2  - Apply yellow socks and bracelet for high fall risk patients  - Consider moving patient to room near nurses station  Outcome: Progressing  Goal: Maintain or return to baseline ADL function  Description: INTERVENTIONS:  -  Assess patient's ability to carry out ADLs; assess patient's baseline for ADL function and identify physical deficits which impact ability to perform ADLs (bathing, care of mouth/teeth, toileting, grooming, dressing, etc.)  - Assess/evaluate cause of self-care deficits   - Assess range of motion  - Assess patient's mobility; develop plan if impaired  - Assess patient's need for assistive devices and provide as appropriate  - Encourage maximum independence but intervene and supervise when necessary  - Involve family in performance of ADLs  - Assess for home care needs following discharge   - Consider OT consult to assist with ADL evaluation and planning for discharge  - Provide patient education as appropriate  Outcome: Progressing  Goal: Maintains/Returns to pre admission functional level  Description: INTERVENTIONS:  - Perform AM-PAC 6 Click Basic Mobility/ Daily Activity assessment daily.  - Set and communicate daily mobility goal to care team and patient/family/caregiver.   - Collaborate with rehabilitation services on mobility goals if consulted  - Perform Range of Motion 2 times a day.  - Reposition patient every 2 hours.  - Dangle patient 2 times a day  - Stand patient 2 times a day  - Ambulate patient 2 times a day  - Out of bed to chair 2 times a day   - Out of bed for meals 2 times a day  - Out of bed for toileting  - Record patient progress and toleration of activity level   Outcome: Progressing     Problem: DISCHARGE PLANNING  Goal: Discharge to home or other facility with appropriate resources  Description: INTERVENTIONS:  - Identify  barriers to discharge w/patient and caregiver  - Arrange for needed discharge resources and transportation as appropriate  - Identify discharge learning needs (meds, wound care, etc.)  - Arrange for interpretive services to assist at discharge as needed  - Refer to Case Management Department for coordinating discharge planning if the patient needs post-hospital services based on physician/advanced practitioner order or complex needs related to functional status, cognitive ability, or social support system  Outcome: Progressing     Problem: Knowledge Deficit  Goal: Patient/family/caregiver demonstrates understanding of disease process, treatment plan, medications, and discharge instructions  Description: Complete learning assessment and assess knowledge base.  Interventions:  - Provide teaching at level of understanding  - Provide teaching via preferred learning methods  Outcome: Progressing     Problem: GASTROINTESTINAL - ADULT  Goal: Minimal or absence of nausea and/or vomiting  Description: INTERVENTIONS:  - Administer IV fluids if ordered to ensure adequate hydration  - Maintain NPO status until nausea and vomiting are resolved  - Nasogastric tube if ordered  - Administer ordered antiemetic medications as needed  - Provide nonpharmacologic comfort measures as appropriate  - Advance diet as tolerated, if ordered  - Consider nutrition services referral to assist patient with adequate nutrition and appropriate food choices  Outcome: Progressing  Goal: Maintains or returns to baseline bowel function  Description: INTERVENTIONS:  - Assess bowel function  - Encourage oral fluids to ensure adequate hydration  - Administer IV fluids if ordered to ensure adequate hydration  - Administer ordered medications as needed  - Encourage mobilization and activity  - Consider nutritional services referral to assist patient with adequate nutrition and appropriate food choices  Outcome: Progressing

## 2024-12-31 NOTE — CASE MANAGEMENT
Case Management Discharge Planning Note    Patient name Royce Rene  Location S /S -01 MRN [id number]  : 1945 Date 2024       Current Admission Date: 2024  Current Admission Diagnosis:Generalized weakness   Patient Active Problem List    Diagnosis Date Noted Date Diagnosed    Hyperuricemia 2024     Iron deficiency anemia 2024     Generalized weakness 2024     Hyponatremia 2024     Diarrhea 2024     Right ankle pain 2024     Diabetic ulcer of toe of right foot associated with type 2 diabetes mellitus, with necrosis of bone (HCC) 2024     Vitamin B12 deficiency 2024     Mixed hyperlipidemia 2024     Exudative age-related macular degeneration, left eye, with active choroidal neovascularization (HCC) 2024     Facial lesion 2024     Dry gangrene (HCC) 2024     Ischemic pain of foot at rest 10/29/2024     Ulcer of right foot with fat layer exposed (HCC) 10/29/2024     Ulcer of left foot, limited to breakdown of skin (HCC) 10/29/2024     Osteomyelitis (HCC) 10/22/2024     Prevention of chemotherapy-induced neutropenia 2024     Frail elder // vulnerable adult 2024     Elevated liver enzymes 2024     Proctitis 2024     Class 1 obesity due to excess calories with body mass index (BMI) of 33.0 to 33.9 in adult 2023     Electrolyte imbalance 2023     Moderate vascular dementia (HCC) 2023     Ambulatory dysfunction 2022     Peripheral neuropathy 2022     Financial problems 2022     Cancer-related pain 05/10/2022     Palliative care patient 05/10/2022     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) 2022     Embolism and 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 (HCC) 2022     Depression, recurrent (HCC) 2022     Port-A-Cath in place 10/05/2021     PAD (peripheral artery disease) (HCC) 2021     Urinary retention 2021     CAD  (coronary artery disease) 07/10/2021     Ischemic leg pain 07/07/2021     Prostate cancer (HCC) 07/07/2021     Ischemic cardiomyopathy 07/01/2021     Chronic combined systolic and diastolic CHF (congestive heart failure) (HCC) 06/22/2021     S/P AVR 06/22/2021     Anemia 06/22/2021       LOS (days): 5  Geometric Mean LOS (GMLOS) (days): 2.5  Days to GMLOS:-2.3     OBJECTIVE:  Risk of Unplanned Readmission Score: 37.19         Current admission status: Inpatient   Preferred Pharmacy:   [facility], [place], [place] - [address] [facility]  [address] [facility] [address]  Phone: [phone] Fax: [phone]    [facility] - [place], [place] - [address] [facility]  [address]  [address]  [place] [address]  Phone: [phone] Fax: [phone]    [facility] [place], [place] - [address]  [address]  [address]  [facility] [address]  Phone: [phone] Fax: [phone]    [place], [place] - [address] [facility]  [address] [facility] [address]  Phone: [phone] Fax: [phone]    [facility], [place], [place] - [address] [facility]  [address] [facility] [address]  Phone: [phone] Fax: [phone]    Primary Care Provider: Anne Chandra MD    Primary Insurance: [employer]  Secondary Insurance:     DISCHARGE DETAILS:                                                                                                               Facility Insurance Auth Number: [id number]

## 2024-12-31 NOTE — PLAN OF CARE
Problem: Prexisting or High Potential for Compromised Skin Integrity  Goal: Skin integrity is maintained or improved  Description: INTERVENTIONS:  - Identify patients at risk for skin breakdown  - Assess and monitor skin integrity  - Assess and monitor nutrition and hydration status  - Monitor labs   - Assess for incontinence   - Turn and reposition patient  - Assist with mobility/ambulation  - Relieve pressure over bony prominences  - Avoid friction and shearing  - Provide appropriate hygiene as needed including keeping skin clean and dry  - Evaluate need for skin moisturizer/barrier cream  - Collaborate with interdisciplinary team   - Patient/family teaching  - Consider wound care consult   12/31/2024 0332 by Dee Dee Corral RN  Outcome: Progressing       Problem: Nutrition/Hydration-ADULT  Goal: Nutrient/Hydration intake appropriate for improving, restoring or maintaining nutritional needs  Description: Monitor and assess patient's nutrition/hydration status for malnutrition. Collaborate with interdisciplinary team and initiate plan and interventions as ordered.  Monitor patient's weight and dietary intake as ordered or per policy. Utilize nutrition screening tool and intervene as necessary. Determine patient's food preferences and provide high-protein, high-caloric foods as appropriate.     INTERVENTIONS:  - Monitor oral intake, urinary output, labs, and treatment plans  - Assess nutrition and hydration status and recommend course of action  - Evaluate amount of meals eaten  - Assist patient with eating if necessary   - Allow adequate time for meals  - Recommend/ encourage appropriate diets, oral nutritional supplements, and vitamin/mineral supplements  - Order, calculate, and assess calorie counts as needed  - Recommend, monitor, and adjust tube feedings and TPN/PPN based on assessed needs  - Assess need for intravenous fluids  - Provide specific nutrition/hydration education as appropriate  - Include  patient/family/caregiver in decisions related to nutrition  12/31/2024 0332 by Dee Dee Corral RN  Outcome: Progressing       Problem: PAIN - ADULT  Goal: Verbalizes/displays adequate comfort level or baseline comfort level  Description: Interventions:  - Encourage patient to monitor pain and request assistance  - Assess pain using appropriate pain scale  - Administer analgesics based on type and severity of pain and evaluate response  - Implement non-pharmacological measures as appropriate and evaluate response  - Consider cultural and social influences on pain and pain management  - Notify physician/advanced practitioner if interventions unsuccessful or patient reports new pain  12/31/2024 0332 by Dee Dee Corral RN  Outcome: Progressing       Problem: INFECTION - ADULT  Goal: Absence or prevention of progression during hospitalization  Description: INTERVENTIONS:  - Assess and monitor for signs and symptoms of infection  - Monitor lab/diagnostic results  - Monitor all insertion sites, i.e. indwelling lines, tubes, and drains  - Monitor endotracheal if appropriate and nasal secretions for changes in amount and color  - [place] appropriate cooling/warming therapies per order  - Administer medications as ordered  - Instruct and encourage patient and family to use good hand hygiene technique  - Identify and instruct in appropriate isolation precautions for identified infection/condition  12/31/2024 0332 by Dee Dee Corral RN  Outcome: Progressing    Goal: Absence of fever/infection during neutropenic period  Description: INTERVENTIONS:  - Monitor WBC    12/31/2024 0332 by Dee Dee Corral RN  Outcome: Progressing    Problem: SAFETY ADULT  Goal: Patient will remain free of falls  Description: INTERVENTIONS:  - Educate patient/family on patient safety including physical limitations  - Instruct patient to call for assistance with activity   - Consult OT/PT to assist with strengthening/mobility   - Keep Call bell within reach  -  Keep bed low and locked with side rails adjusted as appropriate  - Keep care items and personal belongings within reach  - Initiate and maintain comfort rounds  - Make Fall Risk Sign visible to staff  - Offer Toileting every 2 Hours, in advance of need  - Initiate/Maintain bed alarm  - Obtain necessary fall risk management equipment  - Apply yellow socks and bracelet for high fall risk patients  - Consider moving patient to room near nurses station  12/31/2024 0332 by Dee Dee Corral RN  Outcome: Progressing    Goal: Maintain or return to baseline ADL function  Description: INTERVENTIONS:  -  Assess patient's ability to carry out ADLs; assess patient's baseline for ADL function and identify physical deficits which impact ability to perform ADLs (bathing, care of mouth/teeth, toileting, grooming, dressing, etc.)  - Assess/evaluate cause of self-care deficits   - Assess range of motion  - Assess patient's mobility; develop plan if impaired  - Assess patient's need for assistive devices and provide as appropriate  - Encourage maximum independence but intervene and supervise when necessary  - Involve family in performance of ADLs  - Assess for home care needs following discharge   - Consider OT consult to assist with ADL evaluation and planning for discharge  - Provide patient education as appropriate  12/31/2024 0332 by Dee Dee Corral RN  Outcome: Progressing    Goal: Maintains/Returns to pre admission functional level  Description: INTERVENTIONS:  - Perform AM-PAC 6 Click Basic Mobility/ Daily Activity assessment daily.  - Set and communicate daily mobility goal to care team and patient/family/caregiver.   - Collaborate with rehabilitation services on mobility goals if consulted  - Perform Range of Motion 2 times a day.  - Reposition patient every 2 hours.  - Dangle patient 2 times a day  - Stand patient 3 times a day  - Ambulate patient 3 times a day  - Out of bed to chair 3 times a day   - Out of bed for meals 3 times a  day  - Out of bed for toileting  - Record patient progress and toleration of activity level   12/31/2024 0332 by Dee Dee Corral RN  Outcome: Progressing       Problem: DISCHARGE PLANNING  Goal: Discharge to home or other facility with appropriate resources  Description: INTERVENTIONS:  - Identify barriers to discharge w/patient and caregiver  - Arrange for needed discharge resources and transportation as appropriate  - Identify discharge learning needs (meds, wound care, etc.)  - Arrange for interpretive services to assist at discharge as needed  - Refer to Case Management Department for coordinating discharge planning if the patient needs post-hospital services based on physician/advanced practitioner order or complex needs related to functional status, cognitive ability, or social support system  12/31/2024 0332 by Dee Dee Corral RN  Outcome: Progressing       Problem: Knowledge Deficit  Goal: Patient/family/caregiver demonstrates understanding of disease process, treatment plan, medications, and discharge instructions  Description: Complete learning assessment and assess knowledge base.  Interventions:  - Provide teaching at level of understanding  - Provide teaching via preferred learning methods  12/31/2024 0332 by Dee Dee Corral RN  Outcome: Progressing  12/31/2024 0332 by Dee Dee Corral RN  Outcome: Progressing     Problem: GASTROINTESTINAL - ADULT  Goal: Minimal or absence of nausea and/or vomiting  Description: INTERVENTIONS:  - Administer IV fluids if ordered to ensure adequate hydration  - Maintain NPO status until nausea and vomiting are resolved  - Nasogastric tube if ordered  - Administer ordered antiemetic medications as needed  - Provide nonpharmacologic comfort measures as appropriate  - Advance diet as tolerated, if ordered  - Consider nutrition services referral to assist patient with adequate nutrition and appropriate food choices  12/31/2024 0332 by Dee Dee Corral RN  Outcome: Progressing    Goal:  Maintains or returns to baseline bowel function  Description: INTERVENTIONS:  - Assess bowel function  - Encourage oral fluids to ensure adequate hydration  - Administer IV fluids if ordered to ensure adequate hydration  - Administer ordered medications as needed  - Encourage mobilization and activity  - Consider nutritional services referral to assist patient with adequate nutrition and appropriate food choices  12/31/2024 0332 by Dee Dee Corral RN  Outcome: Progressing

## 2024-12-31 NOTE — ASSESSMENT & PLAN NOTE
Presents with generalized weakness, failure to thrive, poor appetite, episodes of loose stools.  Denies fever, chills, sweats, constitutional symptoms  Likely multifactorial - chemotherapy, and V/D, poor oral intake  Supportive cares  PT recommended level II    Plan:  Medically cleared for discharge  Canceled plan for rehab placement  Plan for transfer from [facility] to [facility] for vascular surgery procedure on 1/2/25. Vascular surgery Dr. Harper agrees with plan.  Accepting [facility] hospitalist is Dr. Partida

## 2024-12-31 NOTE — PROGRESS NOTES
Progress Note - Hospitalist   Name: Royce Rene 79 y.o. male I MRN: [id number]  Unit/Bed#: S -01 I Date of Admission: 12/26/2024   Date of Service: 12/31/2024 I Hospital Day: 5    Assessment & Plan  Generalized weakness  Presents with generalized weakness, failure to thrive, poor appetite, episodes of loose stools.  Denies fever, chills, sweats, constitutional symptoms  Likely multifactorial - chemotherapy, and V/D, poor oral intake  Supportive cares  PT recommended level II    Plan:  Medically cleared for discharge  Canceled plan for rehab placement  Plan for transfer from [facility] to [facility] for vascular surgery procedure on 1/2/25. Vascular surgery Dr. Harper agrees with plan.  Accepting [facility] hospitalist is Dr. Partida  Diarrhea  Patient with episodes of diarrhea since receiving chemotherapy last week.  He also reports some nausea and vomiting.  Poor PO intake  Likely chemotherapy associated.  Improving  C. difficile and stool enteric panel negative  Supportive cares  Replete electrolytes  Imodium as needed  Urinary retention  Had to be straight cathed x 1  Started on Flomax and Finasteride with improvement in symptoms  Has been able to urinate on his own  Urinary retention protocol  Right ankle pain  Patient reports right ankle pain  No obvious erythema swelling  X-ray reveals some sclerosis of calcaneus possible stress injury  CRP 79.1, uric acid 11.5.  Possible gout  Podiatry following, appreciate recommendations   Wounds do not appear to be acutely infected.  Agree with prednisone for acute gouty arthropathy.  No surgical plans  Supportive cares    Chronic combined systolic and diastolic CHF (congestive heart failure) (HCC)  Wt Readings from Last 3 Encounters:   12/26/24 100 kg (220 lb 14.4 oz)   12/13/24 103 kg (227 lb)   12/12/24 103 kg (227 lb)     Home diuretic: PO Lasix 40mg QD - which was held during hospital stay.  Can resume  Continue metoprolol, losartan, Jardiance  Low-salt diet  Monitor I/O, daily  weights  Prostate cancer (HCC)  History of prostate cancer follows with oncology receiving chemotherapy (last on 12/12)  Follows with Dr. Hernandez  Current regimen is Cabazitaxel, Neulasta, prednisone, Lupron, denosumab  Outpatient oncology inputs noted  Outpatient follow-up  CAD (coronary artery disease)  Continue aspirin, metoprolol, atorvastatin  PAD (peripheral artery disease) (HCC)  History of extensive peripheral arterial disease  Patient with gangrene of right toe  Continue aspirin, Plavix, atorvastatin, pentoxifylline  Outpatient vascular surgery follow-up.  Plan for intervention on 1/2/2025  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)  See plan under prostate cancer  Cancer-related pain  Oxycodone as needed  Supportive cares  Currently well controlled  Ambulatory dysfunction  Baseline- close to wheelchair bounded- minimal walk.  Fall precautions  PT recommended level II rehab.  Patient is amenable.  CM aware.  Disposition pending placement  Electrolyte imbalance  Lab Results   Component Value Date    SODIUM 138 12/31/2024    K 4.1 12/31/2024    MG 1.9 12/29/2024    PHOS 2.3 12/29/2024    CAIONIZED 1.06 (L) 12/31/2024     With multiple electrolyte imbalances upon arrival   Replete and monitor    Hyponatremia  Lab Results   Component Value Date    SODIUM 138 12/31/2024     Na 132 on arrival.  Hypovolemic hyponatremia.  Resolved  Likely multifactorial with dehydration contributing  Improved with IV fluids and PO intake  Resolved  Class 1 obesity due to excess calories with body mass index (BMI) of 33.0 to 33.9 in adult  Therapeutic lifestyle modification encouraged  Dry gangrene (HCC)  Dry gangrene right big toe with history of peripheral disease  Vascular surgery plans for outpatient follow-up noted    Ulcer of left foot, limited to breakdown of skin (HCC)  Pic under media.  Low suspicion for active infection  Per podiatry, no surgical plans  Iron deficiency anemia  Lab Results   Component Value Date    IRON 21  (L) 12/28/2024    FERRITIN 1,012 (H) 12/28/2024     Continue iron tablet 325 mg  Vitamin B12 deficiency  Lab Results   Component Value Date    [id number] 1,528 (H) 12/28/2024     Stop home Vitamin B12 supplementation  Hyperuricemia  Lab Results   Component Value Date    URICACID 11.5 (H) 12/26/2024     Started on allopurinol 100 mg daily    VTE Pharmacologic Prophylaxis: VTE Score: 8 High Risk (Score >/= 5) - Pharmacological DVT Prophylaxis Ordered: enoxaparin (Lovenox). Sequential Compression Devices Ordered.    Mobility:   Basic Mobility Inpatient Raw Score: 10  JH-HLM Goal: 4: Move to chair/commode  JH-HLM Achieved: 4: Move to chair/commode  JH-HLM Goal NOT achieved. Continue with multidisciplinary rounding and encourage appropriate mobility to improve upon JH-HLM goals.    Patient Centered Rounds: I performed bedside rounds with nursing staff today.   Discussions with Specialists or Other Care Team Provider: Podiatry    Education and Discussions with Family / Patient: Attempted to update  (son) via phone. Left voicemail.     Current Length of Stay: 5 day(s)  Current Patient Status: Inpatient   Certification Statement: The patient will continue to require additional inpatient hospital stay due to pending transfer to [facility]  Discharge Plan: Anticipate discharge in 24-48 hrs to [facility]    Code Status: Level 1 - Full Code    Subjective   Overnight, patient was able to urinate.  He had developed a fever of 101.2 F for which she received Tylenol.  He did not have subsequent fever.  His blood pressure was soft this morning, 100s/60s for which antihypertensive agents were held.    Patient was seen and examined at bedside.  He reports feeling good.  He had only 1 loose bowel movement episode overnight.  He denies any abdominal pain, nausea, vomiting, lightheadedness, chest pain, difficulty breathing.  He is eating well.  He is asking why he cannot go from [facility] ready to [facility] for vascular procedure scheduled on  1/2/2025 instead of rehab placement.    Upon reevaluation, patient opted for S[facility] right to [facility] transfer instead of rehab placement.    Objective :  Temp:  [98.5 °F (36.9 °C)-101.2 °F (38.4 °C)] 98.5 °F (36.9 °C)  HR:  [] 98  BP: (103-104)/(61-64) 103/64  Resp:  [18] 18  SpO2:  [91 %-97 %] 97 %  O2 Device: None (Room air)    Body mass index is 33.59 kg/m².     Input and Output Summary (last 24 hours):     Intake/Output Summary (Last 24 hours) at 12/31/2024 1513  Last data filed at 12/31/2024 0702  Gross per 24 hour   Intake 180 ml   Output 600 ml   Net -420 ml       Physical Exam  Constitutional:       General: He is not in acute distress.     Appearance: Normal appearance. He is obese. He is ill-appearing. He is not toxic-appearing.   HENT:      Head: Normocephalic and atraumatic.      Mouth/Throat:      Mouth: Mucous membranes are moist.   Eyes:      Extraocular Movements: Extraocular movements intact.   Cardiovascular:      Rate and Rhythm: Normal rate and regular rhythm.   Pulmonary:      Effort: No respiratory distress.      Breath sounds: No wheezing, rhonchi or rales.   Abdominal:      General: Bowel sounds are normal. There is distension.      Palpations: Abdomen is soft.      Tenderness: There is no abdominal tenderness. There is no guarding or rebound.   Musculoskeletal:         General: Tenderness (right foot) present. No swelling.      Cervical back: Normal range of motion and neck supple.   Skin:     General: Skin is warm.      Capillary Refill: Capillary refill takes less than 2 seconds.      Findings: Lesion (RLE - see media) present.   Neurological:      General: No focal deficit present.      Mental Status: He is alert and oriented to person, place, and time.       Lines/Drains:      Central Line:  Goal for removal:  chemo port           Lab Results: I have reviewed the following results:   Results from last 7 days   Lab Units 12/31/24  0314 12/28/24  0528 12/27/24  0608   WBC Thousand/uL 14.77*    < > 11.90*   HEMOGLOBIN g/dL 8.6*   < > 9.3*   HEMATOCRIT % 28.3*   < > 29.2*   PLATELETS Thousands/uL 368   < > 281   SEGS PCT %  --   --  83*   LYMPHO PCT %  --   --  7*   MONO PCT %  --   --  9   EOS PCT %  --   --  0    < > = values in this interval not displayed.     Results from last 7 days   Lab Units 12/31/24  0314 12/28/24  0528 12/27/24  0608   SODIUM mmol/L 138   < > 137   POTASSIUM mmol/L 4.1   < > 3.3*   CHLORIDE mmol/L 111*   < > 107   CO2 mmol/L 18*   < > 20*   BUN mg/dL 13   < > 13   CREATININE mg/dL 0.73   < > 0.78   ANION GAP mmol/L 9   < > 10   CALCIUM mg/dL 7.9*   < > 7.0*   ALBUMIN g/dL  --   --  3.4*   TOTAL BILIRUBIN mg/dL  --   --  0.54   ALK PHOS U/L  --   --  70   ALT U/L  --   --  11   AST U/L  --   --  15   GLUCOSE RANDOM mg/dL 106   < > 93    < > = values in this interval not displayed.                 Results from last 7 days   Lab Units 12/26/24  1155   LACTIC ACID mmol/L 1.8       Recent Cultures (last 7 days):   Results from last 7 days   Lab Units 12/27/24  1326   C DIFF TOXIN B BY PCR  Negative       Imaging Results Review: I reviewed radiology reports from this admission including: xray(s).  Other Study Results Review: EKG was reviewed.     Last 24 Hours Medication List:     Current Facility-Administered Medications:     acetaminophen (TYLENOL) tablet 650 mg, Q6H PRN    allopurinol (ZYLOPRIM) tablet 100 mg, Daily    aspirin chewable tablet 81 mg, Daily    atorvastatin (LIPITOR) tablet 80 mg, Daily With Dinner    bismuth subsalicylate (PEPTO BISMOL) oral suspension 524 mg, Q6H PRN    clopidogrel (PLAVIX) tablet 75 mg, Daily    Diclofenac Sodium (VOLTAREN) 1 % topical gel 2 g, 4x Daily    Empagliflozin (JARDIANCE) tablet 10 mg, QAM    enoxaparin (LOVENOX) subcutaneous injection 40 mg, Daily    ezetimibe (ZETIA) tablet 10 mg, Daily    famotidine (PEPCID) tablet 20 mg, Daily    ferrous sulfate tablet 325 mg, Daily    finasteride (PROSCAR) tablet 5 mg, Daily    furosemide (LASIX)  tablet 40 mg, Daily    gabapentin (NEURONTIN) capsule 600 mg, BID    loperamide (IMODIUM) capsule 2 mg, 4x Daily PRN    losartan (COZAAR) tablet 12.5 mg, Daily    menthol-methyl salicylate (BENGAY) 10-15 % cream, 4x Daily PRN    methocarbamol (ROBAXIN) tablet 500 mg, TID    metoprolol succinate (TOPROL-XL) 24 hr tablet 25 mg, Daily    ondansetron (ZOFRAN) injection 4 mg, Q6H PRN    oxyCODONE (ROXICODONE) IR tablet 5 mg, BID PRN    pentoxifylline (TRENtal) ER tablet 400 mg, TID With Meals    potassium chloride (Klor-Con M20) CR tablet 20 mEq, Daily    predniSONE tablet 5 mg, BID With Meals    tamsulosin (FLOMAX) capsule 0.4 mg, Daily With Dinner    Administrative Statements   Today, Patient Was Seen By: Na Solomon DO      **Please Note: This note may have been constructed using a voice recognition system.**

## 2024-12-31 NOTE — ASSESSMENT & PLAN NOTE
Lab Results   Component Value Date    [id number] 1,528 (H) 12/28/2024     Stop home Vitamin B12 supplementation

## 2025-01-01 ENCOUNTER — HOSPITAL ENCOUNTER (INPATIENT)
Facility: HOSPITAL | Age: 80
LOS: 23 days | Discharge: NURSING, REHAB OR LONG-TERM CARE | DRG: 239 | End: 2025-01-24
Attending: INTERNAL MEDICINE
Payer: COMMERCIAL

## 2025-01-01 ENCOUNTER — APPOINTMENT (EMERGENCY)
Dept: RADIOLOGY | Facility: HOSPITAL | Age: 80
End: 2025-01-01
Payer: OTHER GOVERNMENT

## 2025-01-01 ENCOUNTER — ANESTHESIA (EMERGENCY)
Dept: ANESTHESIOLOGY | Facility: HOSPITAL | Age: 80
End: 2025-01-01
Payer: COMMERCIAL

## 2025-01-01 ENCOUNTER — RESULTS FOLLOW-UP (OUTPATIENT)
Dept: EMERGENCY DEPT | Facility: HOSPITAL | Age: 80
End: 2025-01-01

## 2025-01-01 ENCOUNTER — HOSPITAL ENCOUNTER (INPATIENT)
Facility: HOSPITAL | Age: 80
LOS: 1 days | End: 2025-03-15
Attending: SURGERY | Admitting: SURGERY
Payer: COMMERCIAL

## 2025-01-01 ENCOUNTER — TELEPHONE (OUTPATIENT)
Dept: PODIATRY | Facility: CLINIC | Age: 80
End: 2025-01-01

## 2025-01-01 ENCOUNTER — APPOINTMENT (INPATIENT)
Dept: RADIOLOGY | Facility: HOSPITAL | Age: 80
DRG: 239 | End: 2025-01-01
Payer: COMMERCIAL

## 2025-01-01 ENCOUNTER — ANESTHESIA EVENT (EMERGENCY)
Dept: ANESTHESIOLOGY | Facility: HOSPITAL | Age: 80
End: 2025-01-01
Payer: COMMERCIAL

## 2025-01-01 ENCOUNTER — HOSPITAL ENCOUNTER (EMERGENCY)
Facility: HOSPITAL | Age: 80
End: 2025-03-15
Attending: EMERGENCY MEDICINE
Payer: OTHER GOVERNMENT

## 2025-01-01 ENCOUNTER — APPOINTMENT (EMERGENCY)
Dept: CT IMAGING | Facility: HOSPITAL | Age: 80
End: 2025-01-01
Payer: OTHER GOVERNMENT

## 2025-01-01 ENCOUNTER — RESULTS FOLLOW-UP (OUTPATIENT)
Dept: OTHER | Facility: HOSPITAL | Age: 80
End: 2025-01-01

## 2025-01-01 VITALS
HEART RATE: 102 BPM | DIASTOLIC BLOOD PRESSURE: 47 MMHG | RESPIRATION RATE: 33 BRPM | SYSTOLIC BLOOD PRESSURE: 73 MMHG | OXYGEN SATURATION: 96 %

## 2025-01-01 VITALS
HEART RATE: 103 BPM | OXYGEN SATURATION: 97 % | DIASTOLIC BLOOD PRESSURE: 101 MMHG | RESPIRATION RATE: 52 BRPM | SYSTOLIC BLOOD PRESSURE: 127 MMHG | TEMPERATURE: 98.7 F

## 2025-01-01 VITALS
RESPIRATION RATE: 17 BRPM | HEIGHT: 68 IN | SYSTOLIC BLOOD PRESSURE: 97 MMHG | DIASTOLIC BLOOD PRESSURE: 58 MMHG | TEMPERATURE: 99 F | BODY MASS INDEX: 33.48 KG/M2 | OXYGEN SATURATION: 95 % | HEART RATE: 98 BPM | WEIGHT: 220.9 LBS

## 2025-01-01 DIAGNOSIS — K63.89 PNEUMATOSIS INTESTINALIS: ICD-10-CM

## 2025-01-01 DIAGNOSIS — Z95.2 S/P AVR: ICD-10-CM

## 2025-01-01 DIAGNOSIS — I48.91 ATRIAL FIBRILLATION WITH RAPID VENTRICULAR RESPONSE (HCC): ICD-10-CM

## 2025-01-01 DIAGNOSIS — E83.42 HYPOMAGNESEMIA: ICD-10-CM

## 2025-01-01 DIAGNOSIS — R06.00 DYSPNEA: ICD-10-CM

## 2025-01-01 DIAGNOSIS — I26.99 PE (PULMONARY THROMBOEMBOLISM) (HCC): ICD-10-CM

## 2025-01-01 DIAGNOSIS — K55.059 ACUTE MESENTERIC ISCHEMIA (HCC): ICD-10-CM

## 2025-01-01 DIAGNOSIS — I73.9 PAD (PERIPHERAL ARTERY DISEASE) (HCC): ICD-10-CM

## 2025-01-01 DIAGNOSIS — E87.20 LACTIC ACIDOSIS: ICD-10-CM

## 2025-01-01 DIAGNOSIS — K55.9 MESENTERIC ISCHEMIA (HCC): Primary | ICD-10-CM

## 2025-01-01 DIAGNOSIS — I26.99 PULMONARY EMBOLI (HCC): ICD-10-CM

## 2025-01-01 DIAGNOSIS — E87.6 HYPOKALEMIA: ICD-10-CM

## 2025-01-01 DIAGNOSIS — R19.7 DIARRHEA: ICD-10-CM

## 2025-01-01 DIAGNOSIS — L97.512 ULCER OF RIGHT FOOT WITH FAT LAYER EXPOSED (HCC): ICD-10-CM

## 2025-01-01 DIAGNOSIS — L97.521 ULCER OF LEFT FOOT, LIMITED TO BREAKDOWN OF SKIN (HCC): Primary | ICD-10-CM

## 2025-01-01 DIAGNOSIS — R79.89 ELEVATED TROPONIN: ICD-10-CM

## 2025-01-01 DIAGNOSIS — I50.42 CHRONIC COMBINED SYSTOLIC AND DIASTOLIC CHF (CONGESTIVE HEART FAILURE) (HCC): ICD-10-CM

## 2025-01-01 DIAGNOSIS — R10.9 ABDOMINAL PAIN: ICD-10-CM

## 2025-01-01 DIAGNOSIS — Z89.611 S/P AKA (ABOVE KNEE AMPUTATION), RIGHT (HCC): ICD-10-CM

## 2025-01-01 DIAGNOSIS — R79.89 ELEVATED TSH: ICD-10-CM

## 2025-01-01 DIAGNOSIS — Z89.611 STATUS POST ABOVE-KNEE AMPUTATION OF RIGHT LOWER EXTREMITY (HCC): ICD-10-CM

## 2025-01-01 DIAGNOSIS — R79.89 ELEVATED BRAIN NATRIURETIC PEPTIDE (BNP) LEVEL: ICD-10-CM

## 2025-01-01 DIAGNOSIS — R79.89 ELEVATED PROCALCITONIN: ICD-10-CM

## 2025-01-01 DIAGNOSIS — I25.5 ISCHEMIC CARDIOMYOPATHY: ICD-10-CM

## 2025-01-01 DIAGNOSIS — Z45.02 AICD DISCHARGE: ICD-10-CM

## 2025-01-01 DIAGNOSIS — R10.9 ABDOMINAL PAIN: Primary | ICD-10-CM

## 2025-01-01 LAB
ABO GROUP BLD: NORMAL
ABO GROUP BLD: NORMAL
ACANTHOCYTES BLD QL SMEAR: PRESENT
ALBUMIN SERPL BCG-MCNC: 2.9 G/DL (ref 3.5–5)
ALP SERPL-CCNC: 101 U/L (ref 34–104)
ALT SERPL W P-5'-P-CCNC: 12 U/L (ref 7–52)
ANION GAP SERPL CALCULATED.3IONS-SCNC: 20 MMOL/L (ref 4–13)
ANION GAP SERPL CALCULATED.3IONS-SCNC: 7 MMOL/L (ref 4–13)
ANISOCYTOSIS BLD QL SMEAR: PRESENT
APTT PPP: 23 SECONDS (ref 23–34)
AST SERPL W P-5'-P-CCNC: 16 U/L (ref 13–39)
B-OH-BUTYR SERPL-MCNC: 0.12 MMOL/L (ref 0.02–0.27)
BACTERIA UR QL AUTO: ABNORMAL /HPF
BASE EXCESS BLDA CALC-SCNC: -8 MMOL/L (ref -2–3)
BASOPHILS # BLD MANUAL: 0 THOUSAND/UL (ref 0–0.1)
BASOPHILS NFR MAR MANUAL: 0 % (ref 0–1)
BILIRUB SERPL-MCNC: 0.59 MG/DL (ref 0.2–1)
BILIRUB UR QL STRIP: NEGATIVE
BLD GP AB SCN SERPL QL: NEGATIVE
BLD GP AB SCN SERPL QL: NEGATIVE
BNP SERPL-MCNC: 677 PG/ML (ref 0–100)
BUN SERPL-MCNC: 13 MG/DL (ref 5–25)
BUN SERPL-MCNC: 24 MG/DL (ref 5–25)
CA-I BLD-SCNC: 1.11 MMOL/L (ref 1.12–1.32)
CA-I BLD-SCNC: 1.29 MMOL/L (ref 1.12–1.32)
CALCIUM ALBUM COR SERPL-MCNC: 8.6 MG/DL (ref 8.3–10.1)
CALCIUM SERPL-MCNC: 7.7 MG/DL (ref 8.4–10.2)
CALCIUM SERPL-MCNC: 8.1 MG/DL (ref 8.4–10.2)
CARDIAC TROPONIN I PNL SERPL HS: 514 NG/L (ref ?–50)
CHLORIDE SERPL-SCNC: 100 MMOL/L (ref 96–108)
CHLORIDE SERPL-SCNC: 109 MMOL/L (ref 96–108)
CK SERPL-CCNC: 60 U/L (ref 39–308)
CLARITY UR: ABNORMAL
CO2 SERPL-SCNC: 14 MMOL/L (ref 21–32)
CO2 SERPL-SCNC: 22 MMOL/L (ref 21–32)
COLOR UR: YELLOW
CREAT SERPL-MCNC: 0.71 MG/DL (ref 0.6–1.3)
CREAT SERPL-MCNC: 1.49 MG/DL (ref 0.6–1.3)
DIFFERENTIAL COMMENT: ABNORMAL
EOSINOPHIL # BLD MANUAL: 0 THOUSAND/UL (ref 0–0.4)
EOSINOPHIL NFR BLD MANUAL: 0 % (ref 0–6)
ERYTHROCYTE [DISTWIDTH] IN BLOOD BY AUTOMATED COUNT: 14.6 % (ref 11.6–15.1)
ERYTHROCYTE [DISTWIDTH] IN BLOOD BY AUTOMATED COUNT: 16.6 % (ref 11.6–15.1)
FLUAV AG UPPER RESP QL IA.RAPID: NEGATIVE
FLUBV AG UPPER RESP QL IA.RAPID: NEGATIVE
GFR SERPL CREATININE-BSD FRML MDRD: 44 ML/MIN/1.73SQ M
GFR SERPL CREATININE-BSD FRML MDRD: 89 ML/MIN/1.73SQ M
GLUCOSE SERPL-MCNC: 138 MG/DL (ref 65–140)
GLUCOSE SERPL-MCNC: 222 MG/DL (ref 65–140)
GLUCOSE SERPL-MCNC: 231 MG/DL (ref 65–140)
GLUCOSE SERPL-MCNC: 87 MG/DL (ref 65–140)
GLUCOSE UR STRIP-MCNC: ABNORMAL MG/DL
HCO3 BLDA-SCNC: 18.2 MMOL/L (ref 24–30)
HCT VFR BLD AUTO: 27.7 % (ref 36.5–49.3)
HCT VFR BLD AUTO: 42.8 % (ref 36.5–49.3)
HCT VFR BLD CALC: 22 % (ref 36.5–49.3)
HGB BLD-MCNC: 12.6 G/DL (ref 12–17)
HGB BLD-MCNC: 8.9 G/DL (ref 12–17)
HGB BLDA-MCNC: 7.5 G/DL (ref 12–17)
HGB UR QL STRIP.AUTO: ABNORMAL
INR PPP: 1.37 (ref 0.85–1.19)
KETONES UR STRIP-MCNC: NEGATIVE MG/DL
LACTATE SERPL-SCNC: 8 MMOL/L (ref 0.5–2)
LEUKOCYTE ESTERASE UR QL STRIP: ABNORMAL
LG PLATELETS BLD QL SMEAR: PRESENT
LIPASE SERPL-CCNC: <6 U/L (ref 11–82)
LYMPHOCYTES # BLD AUTO: 1.22 THOUSAND/UL (ref 0.6–4.47)
LYMPHOCYTES # BLD AUTO: 8 % (ref 14–44)
MAGNESIUM SERPL-MCNC: 1.8 MG/DL (ref 1.9–2.7)
MCH RBC QN AUTO: 26.4 PG (ref 26.8–34.3)
MCH RBC QN AUTO: 30.3 PG (ref 26.8–34.3)
MCHC RBC AUTO-ENTMCNC: 29.4 G/DL (ref 31.4–37.4)
MCHC RBC AUTO-ENTMCNC: 32.1 G/DL (ref 31.4–37.4)
MCV RBC AUTO: 90 FL (ref 82–98)
MCV RBC AUTO: 94 FL (ref 82–98)
METAMYELOCYTE ABSOLUTE CT: 0.91 THOUSAND/UL (ref 0–0.1)
METAMYELOCYTES NFR BLD MANUAL: 6 % (ref 0–1)
MONOCYTES # BLD AUTO: 0.61 THOUSAND/UL (ref 0–1.22)
MONOCYTES NFR BLD: 4 % (ref 4–12)
NEUTROPHILS # BLD MANUAL: 12.47 THOUSAND/UL (ref 1.85–7.62)
NEUTS BAND NFR BLD MANUAL: 34 % (ref 0–8)
NEUTS SEG NFR BLD AUTO: 48 % (ref 43–75)
NITRITE UR QL STRIP: NEGATIVE
NON-SQ EPI CELLS URNS QL MICRO: ABNORMAL /HPF
OTHER STN SPEC: ABNORMAL
OVALOCYTES BLD QL SMEAR: PRESENT
PATHOLOGY REVIEW: YES
PCO2 BLD: 19 MMOL/L (ref 21–32)
PCO2 BLD: 39.4 MM HG (ref 42–50)
PH BLD: 7.27 [PH] (ref 7.3–7.4)
PH UR STRIP.AUTO: 6 [PH]
PLATELET # BLD AUTO: 376 THOUSANDS/UL (ref 149–390)
PLATELET # BLD AUTO: 400 THOUSANDS/UL (ref 149–390)
PLATELET BLD QL SMEAR: ADEQUATE
PMV BLD AUTO: 10.1 FL (ref 8.9–12.7)
PMV BLD AUTO: 9.9 FL (ref 8.9–12.7)
PO2 BLD: 35 MM HG (ref 35–45)
POIKILOCYTOSIS BLD QL SMEAR: PRESENT
POLYCHROMASIA BLD QL SMEAR: PRESENT
POTASSIUM BLD-SCNC: 2.9 MMOL/L (ref 3.5–5.3)
POTASSIUM SERPL-SCNC: 3 MMOL/L (ref 3.5–5.3)
POTASSIUM SERPL-SCNC: 3.7 MMOL/L (ref 3.5–5.3)
PROCALCITONIN SERPL-MCNC: 6.74 NG/ML
PROT SERPL-MCNC: 5.5 G/DL (ref 6.4–8.4)
PROT UR STRIP-MCNC: ABNORMAL MG/DL
PROTHROMBIN TIME: 17.2 SECONDS (ref 12.3–15)
RBC # BLD AUTO: 2.94 MILLION/UL (ref 3.88–5.62)
RBC # BLD AUTO: 4.78 MILLION/UL (ref 3.88–5.62)
RBC #/AREA URNS AUTO: ABNORMAL /HPF
RBC MORPH BLD: PRESENT
RH BLD: POSITIVE
RH BLD: POSITIVE
SAO2 % BLD FROM PO2: 60 % (ref 60–85)
SARS-COV+SARS-COV-2 AG RESP QL IA.RAPID: NEGATIVE
SODIUM BLD-SCNC: 143 MMOL/L (ref 136–145)
SODIUM SERPL-SCNC: 134 MMOL/L (ref 135–147)
SODIUM SERPL-SCNC: 138 MMOL/L (ref 135–147)
SP GR UR STRIP.AUTO: 1.01 (ref 1–1.03)
SPECIMEN EXPIRATION DATE: NORMAL
SPECIMEN EXPIRATION DATE: NORMAL
SPECIMEN SOURCE: ABNORMAL
T4 FREE SERPL-MCNC: 1.39 NG/DL (ref 0.61–1.12)
TSH SERPL DL<=0.05 MIU/L-ACNC: 4.93 UIU/ML (ref 0.45–4.5)
UROBILINOGEN UR QL STRIP.AUTO: 0.2 E.U./DL
WBC # BLD AUTO: 13.78 THOUSAND/UL (ref 4.31–10.16)
WBC # BLD AUTO: 15.21 THOUSAND/UL (ref 4.31–10.16)
WBC #/AREA URNS AUTO: ABNORMAL /HPF

## 2025-01-01 PROCEDURE — 96375 TX/PRO/DX INJ NEW DRUG ADDON: CPT

## 2025-01-01 PROCEDURE — 84443 ASSAY THYROID STIM HORMONE: CPT | Performed by: EMERGENCY MEDICINE

## 2025-01-01 PROCEDURE — 82330 ASSAY OF CALCIUM: CPT

## 2025-01-01 PROCEDURE — NC001 PR NO CHARGE: Performed by: PODIATRIST

## 2025-01-01 PROCEDURE — 82010 KETONE BODYS QUAN: CPT | Performed by: EMERGENCY MEDICINE

## 2025-01-01 PROCEDURE — 86850 RBC ANTIBODY SCREEN: CPT

## 2025-01-01 PROCEDURE — 82948 REAGENT STRIP/BLOOD GLUCOSE: CPT

## 2025-01-01 PROCEDURE — 86850 RBC ANTIBODY SCREEN: CPT | Performed by: EMERGENCY MEDICINE

## 2025-01-01 PROCEDURE — 82947 ASSAY GLUCOSE BLOOD QUANT: CPT

## 2025-01-01 PROCEDURE — 96367 TX/PROPH/DG ADDL SEQ IV INF: CPT

## 2025-01-01 PROCEDURE — 83735 ASSAY OF MAGNESIUM: CPT | Performed by: EMERGENCY MEDICINE

## 2025-01-01 PROCEDURE — 99223 1ST HOSP IP/OBS HIGH 75: CPT

## 2025-01-01 PROCEDURE — 87106 FUNGI IDENTIFICATION YEAST: CPT | Performed by: EMERGENCY MEDICINE

## 2025-01-01 PROCEDURE — 84132 ASSAY OF SERUM POTASSIUM: CPT

## 2025-01-01 PROCEDURE — 80053 COMPREHEN METABOLIC PANEL: CPT | Performed by: EMERGENCY MEDICINE

## 2025-01-01 PROCEDURE — 36415 COLL VENOUS BLD VENIPUNCTURE: CPT | Performed by: EMERGENCY MEDICINE

## 2025-01-01 PROCEDURE — 74177 CT ABD & PELVIS W/CONTRAST: CPT

## 2025-01-01 PROCEDURE — 84439 ASSAY OF FREE THYROXINE: CPT | Performed by: EMERGENCY MEDICINE

## 2025-01-01 PROCEDURE — 82803 BLOOD GASES ANY COMBINATION: CPT

## 2025-01-01 PROCEDURE — 96374 THER/PROPH/DIAG INJ IV PUSH: CPT

## 2025-01-01 PROCEDURE — 36556 INSERT NON-TUNNEL CV CATH: CPT | Performed by: EMERGENCY MEDICINE

## 2025-01-01 PROCEDURE — 99291 CRITICAL CARE FIRST HOUR: CPT | Performed by: EMERGENCY MEDICINE

## 2025-01-01 PROCEDURE — 86901 BLOOD TYPING SEROLOGIC RH(D): CPT | Performed by: EMERGENCY MEDICINE

## 2025-01-01 PROCEDURE — 06HF33Z INSERTION OF INFUSION DEVICE INTO RIGHT EXTERNAL ILIAC VEIN, PERCUTANEOUS APPROACH: ICD-10-PCS | Performed by: STUDENT IN AN ORGANIZED HEALTH CARE EDUCATION/TRAINING PROGRAM

## 2025-01-01 PROCEDURE — NC001 PR NO CHARGE: Performed by: SURGERY

## 2025-01-01 PROCEDURE — 83880 ASSAY OF NATRIURETIC PEPTIDE: CPT | Performed by: EMERGENCY MEDICINE

## 2025-01-01 PROCEDURE — 73630 X-RAY EXAM OF FOOT: CPT

## 2025-01-01 PROCEDURE — 81001 URINALYSIS AUTO W/SCOPE: CPT | Performed by: EMERGENCY MEDICINE

## 2025-01-01 PROCEDURE — 5A1221J PERFORMANCE OF CARDIAC OUTPUT, CONTINUOUS, AUTOMATED: ICD-10-PCS | Performed by: STUDENT IN AN ORGANIZED HEALTH CARE EDUCATION/TRAINING PROGRAM

## 2025-01-01 PROCEDURE — 85610 PROTHROMBIN TIME: CPT | Performed by: EMERGENCY MEDICINE

## 2025-01-01 PROCEDURE — 87040 BLOOD CULTURE FOR BACTERIA: CPT | Performed by: EMERGENCY MEDICINE

## 2025-01-01 PROCEDURE — 96365 THER/PROPH/DIAG IV INF INIT: CPT

## 2025-01-01 PROCEDURE — 99223 1ST HOSP IP/OBS HIGH 75: CPT | Performed by: SURGERY

## 2025-01-01 PROCEDURE — 75635 CT ANGIO ABDOMINAL ARTERIES: CPT

## 2025-01-01 PROCEDURE — 84295 ASSAY OF SERUM SODIUM: CPT

## 2025-01-01 PROCEDURE — 96361 HYDRATE IV INFUSION ADD-ON: CPT

## 2025-01-01 PROCEDURE — 86900 BLOOD TYPING SEROLOGIC ABO: CPT

## 2025-01-01 PROCEDURE — 99239 HOSP IP/OBS DSCHRG MGMT >30: CPT | Performed by: INTERNAL MEDICINE

## 2025-01-01 PROCEDURE — 84145 PROCALCITONIN (PCT): CPT | Performed by: EMERGENCY MEDICINE

## 2025-01-01 PROCEDURE — 83605 ASSAY OF LACTIC ACID: CPT | Performed by: EMERGENCY MEDICINE

## 2025-01-01 PROCEDURE — 71045 X-RAY EXAM CHEST 1 VIEW: CPT

## 2025-01-01 PROCEDURE — 96368 THER/DIAG CONCURRENT INF: CPT

## 2025-01-01 PROCEDURE — 86901 BLOOD TYPING SEROLOGIC RH(D): CPT

## 2025-01-01 PROCEDURE — 99285 EMERGENCY DEPT VISIT HI MDM: CPT

## 2025-01-01 PROCEDURE — 86900 BLOOD TYPING SEROLOGIC ABO: CPT | Performed by: EMERGENCY MEDICINE

## 2025-01-01 PROCEDURE — 93005 ELECTROCARDIOGRAM TRACING: CPT

## 2025-01-01 PROCEDURE — 85027 COMPLETE CBC AUTOMATED: CPT | Performed by: EMERGENCY MEDICINE

## 2025-01-01 PROCEDURE — 85027 COMPLETE CBC AUTOMATED: CPT

## 2025-01-01 PROCEDURE — 85730 THROMBOPLASTIN TIME PARTIAL: CPT | Performed by: EMERGENCY MEDICINE

## 2025-01-01 PROCEDURE — 82550 ASSAY OF CK (CPK): CPT | Performed by: EMERGENCY MEDICINE

## 2025-01-01 PROCEDURE — 87811 SARS-COV-2 COVID19 W/OPTIC: CPT | Performed by: EMERGENCY MEDICINE

## 2025-01-01 PROCEDURE — 0BH17EZ INSERTION OF ENDOTRACHEAL AIRWAY INTO TRACHEA, VIA NATURAL OR ARTIFICIAL OPENING: ICD-10-PCS | Performed by: STUDENT IN AN ORGANIZED HEALTH CARE EDUCATION/TRAINING PROGRAM

## 2025-01-01 PROCEDURE — 87086 URINE CULTURE/COLONY COUNT: CPT | Performed by: EMERGENCY MEDICINE

## 2025-01-01 PROCEDURE — 83690 ASSAY OF LIPASE: CPT | Performed by: EMERGENCY MEDICINE

## 2025-01-01 PROCEDURE — 71275 CT ANGIOGRAPHY CHEST: CPT

## 2025-01-01 PROCEDURE — 84484 ASSAY OF TROPONIN QUANT: CPT | Performed by: EMERGENCY MEDICINE

## 2025-01-01 PROCEDURE — 85014 HEMATOCRIT: CPT

## 2025-01-01 PROCEDURE — 87804 INFLUENZA ASSAY W/OPTIC: CPT | Performed by: EMERGENCY MEDICINE

## 2025-01-01 PROCEDURE — 80048 BASIC METABOLIC PNL TOTAL CA: CPT

## 2025-01-01 PROCEDURE — 85007 BL SMEAR W/DIFF WBC COUNT: CPT | Performed by: EMERGENCY MEDICINE

## 2025-01-01 PROCEDURE — 31500 INSERT EMERGENCY AIRWAY: CPT | Performed by: EMERGENCY MEDICINE

## 2025-01-01 PROCEDURE — 87077 CULTURE AEROBIC IDENTIFY: CPT | Performed by: EMERGENCY MEDICINE

## 2025-01-01 PROCEDURE — 99284 EMERGENCY DEPT VISIT MOD MDM: CPT

## 2025-01-01 RX ORDER — VANCOMYCIN HYDROCHLORIDE 1 G/20ML
INJECTION, POWDER, LYOPHILIZED, FOR SOLUTION INTRAVENOUS
Status: DISCONTINUED
Start: 2025-01-01 | End: 2025-01-01 | Stop reason: HOSPADM

## 2025-01-01 RX ORDER — MIDAZOLAM HYDROCHLORIDE 2 MG/2ML
INJECTION, SOLUTION INTRAMUSCULAR; INTRAVENOUS AS NEEDED
Status: DISCONTINUED | OUTPATIENT
Start: 2025-01-01 | End: 2025-01-01

## 2025-01-01 RX ORDER — ALLOPURINOL 100 MG/1
100 TABLET ORAL DAILY
Status: DISCONTINUED | OUTPATIENT
Start: 2025-01-01 | End: 2025-01-24 | Stop reason: HOSPADM

## 2025-01-01 RX ORDER — FENTANYL CITRATE 50 UG/ML
25 INJECTION, SOLUTION INTRAMUSCULAR; INTRAVENOUS ONCE
Refills: 0 | Status: COMPLETED | OUTPATIENT
Start: 2025-01-01 | End: 2025-01-01

## 2025-01-01 RX ORDER — PREDNISONE 5 MG/1
5 TABLET ORAL 2 TIMES DAILY WITH MEALS
Status: DISCONTINUED | OUTPATIENT
Start: 2025-01-01 | End: 2025-01-24 | Stop reason: HOSPADM

## 2025-01-01 RX ORDER — TAMSULOSIN HYDROCHLORIDE 0.4 MG/1
0.4 CAPSULE ORAL
Status: DISCONTINUED | OUTPATIENT
Start: 2025-01-01 | End: 2025-01-24 | Stop reason: HOSPADM

## 2025-01-01 RX ORDER — SODIUM CHLORIDE, SODIUM GLUCONATE, SODIUM ACETATE, POTASSIUM CHLORIDE, MAGNESIUM CHLORIDE, SODIUM PHOSPHATE, DIBASIC, AND POTASSIUM PHOSPHATE .53; .5; .37; .037; .03; .012; .00082 G/100ML; G/100ML; G/100ML; G/100ML; G/100ML; G/100ML; G/100ML
50 INJECTION, SOLUTION INTRAVENOUS CONTINUOUS
Status: DISCONTINUED | OUTPATIENT
Start: 2025-01-02 | End: 2025-01-01

## 2025-01-01 RX ORDER — HEPARIN SODIUM 1000 [USP'U]/ML
6800 INJECTION, SOLUTION INTRAVENOUS; SUBCUTANEOUS ONCE
Status: COMPLETED | OUTPATIENT
Start: 2025-01-01 | End: 2025-01-01

## 2025-01-01 RX ORDER — HEPARIN SODIUM 10000 [USP'U]/100ML
3-30 INJECTION, SOLUTION INTRAVENOUS
Status: DISCONTINUED | OUTPATIENT
Start: 2025-01-01 | End: 2025-01-01 | Stop reason: HOSPADM

## 2025-01-01 RX ORDER — TAMSULOSIN HYDROCHLORIDE 0.4 MG/1
0.4 CAPSULE ORAL
Qty: 30 CAPSULE | Refills: 0 | Status: ON HOLD | OUTPATIENT
Start: 2025-01-01

## 2025-01-01 RX ORDER — FAMOTIDINE 20 MG/1
20 TABLET, FILM COATED ORAL DAILY
Status: DISCONTINUED | OUTPATIENT
Start: 2025-01-01 | End: 2025-01-24 | Stop reason: HOSPADM

## 2025-01-01 RX ORDER — ALLOPURINOL 100 MG/1
100 TABLET ORAL DAILY
Qty: 30 TABLET | Refills: 0 | Status: ON HOLD | OUTPATIENT
Start: 2025-01-01

## 2025-01-01 RX ORDER — POTASSIUM CHLORIDE 1500 MG/1
20 TABLET, EXTENDED RELEASE ORAL DAILY
Status: DISCONTINUED | OUTPATIENT
Start: 2025-01-01 | End: 2025-01-16

## 2025-01-01 RX ORDER — MUSCLE RUB CREAM 100; 150 MG/G; MG/G
CREAM TOPICAL 4 TIMES DAILY PRN
Status: DISCONTINUED | OUTPATIENT
Start: 2025-01-01 | End: 2025-01-24 | Stop reason: HOSPADM

## 2025-01-01 RX ORDER — ASPIRIN 81 MG/1
81 TABLET, CHEWABLE ORAL DAILY
Status: DISCONTINUED | OUTPATIENT
Start: 2025-01-01 | End: 2025-01-24 | Stop reason: HOSPADM

## 2025-01-01 RX ORDER — ATORVASTATIN CALCIUM 80 MG/1
80 TABLET, FILM COATED ORAL
Status: DISCONTINUED | OUTPATIENT
Start: 2025-01-01 | End: 2025-01-24 | Stop reason: HOSPADM

## 2025-01-01 RX ORDER — PENTOXIFYLLINE 400 MG/1
400 TABLET, EXTENDED RELEASE ORAL
Status: DISCONTINUED | OUTPATIENT
Start: 2025-01-01 | End: 2025-01-24 | Stop reason: HOSPADM

## 2025-01-01 RX ORDER — DILTIAZEM HYDROCHLORIDE 5 MG/ML
20 INJECTION INTRAVENOUS ONCE
Status: COMPLETED | OUTPATIENT
Start: 2025-01-01 | End: 2025-01-01

## 2025-01-01 RX ORDER — METHOCARBAMOL 500 MG/1
500 TABLET, FILM COATED ORAL 3 TIMES DAILY
Status: DISCONTINUED | OUTPATIENT
Start: 2025-01-01 | End: 2025-01-23

## 2025-01-01 RX ORDER — FENTANYL CITRATE 50 UG/ML
100 INJECTION, SOLUTION INTRAMUSCULAR; INTRAVENOUS ONCE
Refills: 0 | Status: COMPLETED | OUTPATIENT
Start: 2025-01-01 | End: 2025-01-01

## 2025-01-01 RX ORDER — HEPARIN SODIUM 1000 [USP'U]/ML
6800 INJECTION, SOLUTION INTRAVENOUS; SUBCUTANEOUS EVERY 6 HOURS PRN
Status: DISCONTINUED | OUTPATIENT
Start: 2025-01-01 | End: 2025-01-01 | Stop reason: HOSPADM

## 2025-01-01 RX ORDER — FENTANYL CITRATE 50 UG/ML
INJECTION, SOLUTION INTRAMUSCULAR; INTRAVENOUS
Status: DISCONTINUED
Start: 2025-01-01 | End: 2025-01-01 | Stop reason: HOSPADM

## 2025-01-01 RX ORDER — ACETAMINOPHEN 325 MG/1
650 TABLET ORAL EVERY 6 HOURS PRN
Status: DISCONTINUED | OUTPATIENT
Start: 2025-01-01 | End: 2025-01-07

## 2025-01-01 RX ORDER — FINASTERIDE 5 MG/1
5 TABLET, FILM COATED ORAL DAILY
Qty: 30 TABLET | Refills: 0 | Status: ON HOLD | OUTPATIENT
Start: 2025-01-01

## 2025-01-01 RX ORDER — CEFAZOLIN SODIUM 2 G/50ML
2000 SOLUTION INTRAVENOUS ONCE
Status: DISCONTINUED | OUTPATIENT
Start: 2025-01-01 | End: 2025-01-01

## 2025-01-01 RX ORDER — NOREPINEPHRINE BITARTRATE 1 MG/ML
INJECTION, SOLUTION INTRAVENOUS
Status: DISCONTINUED
Start: 2025-01-01 | End: 2025-01-01 | Stop reason: HOSPADM

## 2025-01-01 RX ORDER — METOPROLOL TARTRATE 1 MG/ML
INJECTION, SOLUTION INTRAVENOUS
Status: DISCONTINUED
Start: 2025-01-01 | End: 2025-01-01 | Stop reason: HOSPADM

## 2025-01-01 RX ORDER — INDOMETHACIN 25 MG/1
CAPSULE ORAL
Status: DISCONTINUED
Start: 2025-01-01 | End: 2025-01-01 | Stop reason: HOSPADM

## 2025-01-01 RX ORDER — FENTANYL CITRATE 50 UG/ML
INJECTION, SOLUTION INTRAMUSCULAR; INTRAVENOUS AS NEEDED
Status: DISCONTINUED | OUTPATIENT
Start: 2025-01-01 | End: 2025-01-01

## 2025-01-01 RX ORDER — HEPARIN SODIUM 5000 [USP'U]/ML
5000 INJECTION, SOLUTION INTRAVENOUS; SUBCUTANEOUS EVERY 8 HOURS SCHEDULED
Status: DISCONTINUED | OUTPATIENT
Start: 2025-01-01 | End: 2025-01-01 | Stop reason: HOSPADM

## 2025-01-01 RX ORDER — CALCIUM GLUCONATE 20 MG/ML
1 INJECTION, SOLUTION INTRAVENOUS ONCE
Status: DISCONTINUED | OUTPATIENT
Start: 2025-01-01 | End: 2025-01-01 | Stop reason: HOSPADM

## 2025-01-01 RX ORDER — OXYCODONE HYDROCHLORIDE 5 MG/1
5 TABLET ORAL 2 TIMES DAILY PRN
Refills: 0 | Status: DISCONTINUED | OUTPATIENT
Start: 2025-01-01 | End: 2025-01-02

## 2025-01-01 RX ORDER — CLOPIDOGREL BISULFATE 75 MG/1
75 TABLET ORAL DAILY
Status: DISCONTINUED | OUTPATIENT
Start: 2025-01-01 | End: 2025-01-24 | Stop reason: HOSPADM

## 2025-01-01 RX ORDER — CALCIUM GLUCONATE 20 MG/ML
1 INJECTION, SOLUTION INTRAVENOUS ONCE
Qty: 50 ML | Refills: 0 | Status: ON HOLD | OUTPATIENT
Start: 2025-01-01 | End: 2025-01-01

## 2025-01-01 RX ORDER — LOSARTAN POTASSIUM 25 MG/1
12.5 TABLET ORAL DAILY
Status: DISCONTINUED | OUTPATIENT
Start: 2025-01-01 | End: 2025-01-24 | Stop reason: HOSPADM

## 2025-01-01 RX ORDER — POTASSIUM CHLORIDE 1500 MG/1
20 TABLET, EXTENDED RELEASE ORAL DAILY
Qty: 30 TABLET | Refills: 5 | Status: SHIPPED | OUTPATIENT
Start: 2025-01-01 | End: 2025-01-01

## 2025-01-01 RX ORDER — FUROSEMIDE 40 MG/1
40 TABLET ORAL DAILY
Status: DISCONTINUED | OUTPATIENT
Start: 2025-01-01 | End: 2025-01-24 | Stop reason: HOSPADM

## 2025-01-01 RX ORDER — ENOXAPARIN SODIUM 100 MG/ML
40 INJECTION SUBCUTANEOUS DAILY
Status: DISCONTINUED | OUTPATIENT
Start: 2025-01-01 | End: 2025-01-24 | Stop reason: HOSPADM

## 2025-01-01 RX ORDER — EZETIMIBE 10 MG/1
10 TABLET ORAL DAILY
Status: DISCONTINUED | OUTPATIENT
Start: 2025-01-01 | End: 2025-01-24 | Stop reason: HOSPADM

## 2025-01-01 RX ORDER — LOPERAMIDE HYDROCHLORIDE 2 MG/1
2 CAPSULE ORAL 4 TIMES DAILY PRN
Status: DISCONTINUED | OUTPATIENT
Start: 2025-01-01 | End: 2025-01-24 | Stop reason: HOSPADM

## 2025-01-01 RX ORDER — MAGNESIUM SULFATE 1 G/100ML
1 INJECTION INTRAVENOUS ONCE
Status: COMPLETED | OUTPATIENT
Start: 2025-01-01 | End: 2025-01-01

## 2025-01-01 RX ORDER — FERROUS SULFATE 325(65) MG
325 TABLET ORAL DAILY
Status: DISCONTINUED | OUTPATIENT
Start: 2025-01-01 | End: 2025-01-24 | Stop reason: HOSPADM

## 2025-01-01 RX ORDER — GABAPENTIN 300 MG/1
600 CAPSULE ORAL 2 TIMES DAILY
Status: DISCONTINUED | OUTPATIENT
Start: 2025-01-01 | End: 2025-01-24 | Stop reason: HOSPADM

## 2025-01-01 RX ORDER — HEPARIN SODIUM 1000 [USP'U]/ML
3400 INJECTION, SOLUTION INTRAVENOUS; SUBCUTANEOUS EVERY 6 HOURS PRN
Status: DISCONTINUED | OUTPATIENT
Start: 2025-01-01 | End: 2025-01-01 | Stop reason: HOSPADM

## 2025-01-01 RX ORDER — FINASTERIDE 5 MG/1
5 TABLET, FILM COATED ORAL DAILY
Status: DISCONTINUED | OUTPATIENT
Start: 2025-01-01 | End: 2025-01-24 | Stop reason: HOSPADM

## 2025-01-01 RX ORDER — POTASSIUM CHLORIDE 14.9 MG/ML
20 INJECTION INTRAVENOUS
Status: DISCONTINUED | OUTPATIENT
Start: 2025-01-01 | End: 2025-01-01 | Stop reason: HOSPADM

## 2025-01-01 RX ORDER — CHLORHEXIDINE GLUCONATE ORAL RINSE 1.2 MG/ML
15 SOLUTION DENTAL ONCE
Status: DISCONTINUED | OUTPATIENT
Start: 2025-01-01 | End: 2025-01-01

## 2025-01-01 RX ORDER — METOPROLOL SUCCINATE 25 MG/1
25 TABLET, EXTENDED RELEASE ORAL DAILY
Status: DISCONTINUED | OUTPATIENT
Start: 2025-01-01 | End: 2025-01-24 | Stop reason: HOSPADM

## 2025-01-01 RX ORDER — ONDANSETRON 2 MG/ML
4 INJECTION INTRAMUSCULAR; INTRAVENOUS EVERY 6 HOURS PRN
Status: DISCONTINUED | OUTPATIENT
Start: 2025-01-01 | End: 2025-01-24 | Stop reason: HOSPADM

## 2025-01-01 RX ADMIN — EZETIMIBE 10 MG: 10 TABLET ORAL at 09:52

## 2025-01-01 RX ADMIN — DICLOFENAC SODIUM 2 G: 10 GEL TOPICAL at 21:19

## 2025-01-01 RX ADMIN — SODIUM CHLORIDE 1000 ML: 0.9 INJECTION, SOLUTION INTRAVENOUS at 04:14

## 2025-01-01 RX ADMIN — METHOCARBAMOL 500 MG: 500 TABLET ORAL at 21:19

## 2025-01-01 RX ADMIN — DICLOFENAC SODIUM 2 G: 10 GEL TOPICAL at 17:19

## 2025-01-01 RX ADMIN — ACETAMINOPHEN 650 MG: 325 TABLET, FILM COATED ORAL at 17:22

## 2025-01-01 RX ADMIN — FENTANYL CITRATE 25 MCG: 50 INJECTION, SOLUTION INTRAMUSCULAR; INTRAVENOUS at 05:05

## 2025-01-01 RX ADMIN — METHOCARBAMOL 500 MG: 500 TABLET ORAL at 17:18

## 2025-01-01 RX ADMIN — MAGNESIUM SULFATE HEPTAHYDRATE 1 G: 1 INJECTION, SOLUTION INTRAVENOUS at 04:29

## 2025-01-01 RX ADMIN — OXYCODONE HYDROCHLORIDE 5 MG: 5 TABLET ORAL at 09:50

## 2025-01-01 RX ADMIN — OXYCODONE HYDROCHLORIDE 5 MG: 5 TABLET ORAL at 05:40

## 2025-01-01 RX ADMIN — GABAPENTIN 600 MG: 300 CAPSULE ORAL at 17:18

## 2025-01-01 RX ADMIN — IOHEXOL 100 ML: 350 INJECTION, SOLUTION INTRAVENOUS at 12:39

## 2025-01-01 RX ADMIN — TAMSULOSIN HYDROCHLORIDE 0.4 MG: 0.4 CAPSULE ORAL at 17:20

## 2025-01-01 RX ADMIN — METHOCARBAMOL 500 MG: 500 TABLET ORAL at 09:52

## 2025-01-01 RX ADMIN — SODIUM CHLORIDE 1000 ML: 0.9 INJECTION, SOLUTION INTRAVENOUS at 04:51

## 2025-01-01 RX ADMIN — POTASSIUM CHLORIDE 20 MEQ: 1500 TABLET, EXTENDED RELEASE ORAL at 09:52

## 2025-01-01 RX ADMIN — HEPARIN SODIUM 18 UNITS/KG/HR: 10000 INJECTION, SOLUTION INTRAVENOUS at 04:58

## 2025-01-01 RX ADMIN — PREDNISONE 5 MG: 10 TABLET ORAL at 07:44

## 2025-01-01 RX ADMIN — FINASTERIDE 5 MG: 5 TABLET, FILM COATED ORAL at 09:52

## 2025-01-01 RX ADMIN — FERROUS SULFATE TAB 325 MG (65 MG ELEMENTAL FE) 325 MG: 325 (65 FE) TAB at 09:52

## 2025-01-01 RX ADMIN — ALLOPURINOL 100 MG: 100 TABLET ORAL at 09:52

## 2025-01-01 RX ADMIN — VANCOMYCIN HYDROCHLORIDE 2000 MG: 1 INJECTION, POWDER, LYOPHILIZED, FOR SOLUTION INTRAVENOUS at 04:54

## 2025-01-01 RX ADMIN — FENTANYL CITRATE 100 MCG: 50 INJECTION INTRAMUSCULAR; INTRAVENOUS at 06:27

## 2025-01-01 RX ADMIN — DILTIAZEM HYDROCHLORIDE 20 MG: 5 INJECTION, SOLUTION INTRAVENOUS at 03:43

## 2025-01-01 RX ADMIN — SODIUM CHLORIDE 1000 ML: 0.9 INJECTION, SOLUTION INTRAVENOUS at 04:50

## 2025-01-01 RX ADMIN — PENTOXIFYLLINE 400 MG: 400 TABLET, EXTENDED RELEASE ORAL at 07:45

## 2025-01-01 RX ADMIN — IOHEXOL 100 ML: 350 INJECTION, SOLUTION INTRAVENOUS at 04:13

## 2025-01-01 RX ADMIN — AMIODARONE HYDROCHLORIDE 1 MG/MIN: 50 INJECTION, SOLUTION INTRAVENOUS at 06:33

## 2025-01-01 RX ADMIN — CLOPIDOGREL BISULFATE 75 MG: 75 TABLET, FILM COATED ORAL at 09:52

## 2025-01-01 RX ADMIN — SODIUM CHLORIDE 500 ML: 0.9 INJECTION, SOLUTION INTRAVENOUS at 03:38

## 2025-01-01 RX ADMIN — METOPROLOL SUCCINATE 25 MG: 25 TABLET, EXTENDED RELEASE ORAL at 09:52

## 2025-01-01 RX ADMIN — GABAPENTIN 600 MG: 300 CAPSULE ORAL at 09:52

## 2025-01-01 RX ADMIN — ENOXAPARIN SODIUM 40 MG: 40 INJECTION SUBCUTANEOUS at 09:52

## 2025-01-01 RX ADMIN — ACETAMINOPHEN 650 MG: 325 TABLET, FILM COATED ORAL at 01:28

## 2025-01-01 RX ADMIN — ATORVASTATIN CALCIUM 80 MG: 80 TABLET, FILM COATED ORAL at 17:18

## 2025-01-01 RX ADMIN — ASPIRIN 81 MG CHEWABLE TABLET 81 MG: 81 TABLET CHEWABLE at 09:52

## 2025-01-01 RX ADMIN — POTASSIUM CHLORIDE 20 MEQ: 14.9 INJECTION, SOLUTION INTRAVENOUS at 04:49

## 2025-01-01 RX ADMIN — PREDNISONE 5 MG: 5 TABLET ORAL at 17:18

## 2025-01-01 RX ADMIN — HEPARIN SODIUM 6800 UNITS: 1000 INJECTION, SOLUTION INTRAVENOUS; SUBCUTANEOUS at 04:57

## 2025-01-01 RX ADMIN — FAMOTIDINE 20 MG: 20 TABLET, FILM COATED ORAL at 09:52

## 2025-01-01 RX ADMIN — FENTANYL CITRATE 25 MCG: 50 INJECTION INTRAMUSCULAR; INTRAVENOUS at 04:55

## 2025-01-01 RX ADMIN — PENTOXIFYLLINE 400 MG: 400 TABLET, EXTENDED RELEASE ORAL at 17:19

## 2025-01-01 RX ADMIN — PIPERACILLIN AND TAZOBACTAM 4.5 G: 4; .5 INJECTION, POWDER, FOR SOLUTION INTRAVENOUS at 04:29

## 2025-01-01 NOTE — ASSESSMENT & PLAN NOTE
Baseline- close to wheelchair bounded- minimal walk.  Fall precautions  PT in [facility] recommended level II rehab.  Patient is amenable.   Will find placement after procedure

## 2025-01-01 NOTE — ASSESSMENT & PLAN NOTE
Lab Results   Component Value Date    [id number] 1,528 (H) 12/28/2024     Stop home Vitamin B12 supplementation

## 2025-01-01 NOTE — ASSESSMENT & PLAN NOTE
Right foot great toe gangrene with concern for underlying osteomyelitis  Severe peripheral vascular disease  Ulceration left foot midfoot lateral  History of extensive peripheral arterial disease  Podiatry and Vascular onboard  Continue aspirin, Plavix, atorvastatin, pentoxifylline  Transferred to [facility] on 1/1/2025. Plan for limbflow procedure by vascular surgery.

## 2025-01-01 NOTE — CONSULTS
Consultation - Vascular Surgery   Name: Royce Rene 79 y.o. male I MRN: [id number]  Unit/Bed#: [facility] 523-01 I Date of Admission: 1/1/2025   Date of Service: 1/1/2025 I Hospital Day: 0   Inpatient consult to Vascular Surgery  Consult performed by: Joshua Amador MD  Consult ordered by: Tato Nixon MD        Physician Requesting Evaluation: Tato Nixon, *   Reason for Evaluation / Principal Problem: PAD    Assessment & Plan  PAD (peripheral artery disease) (HCC)  - Plan for OR 1/2 for RLE angio and limb flow procedure, possible LLE angio  - Podiatry consulted, appreciate recs  - NPO MN  - Continue ASA, statin, plavix  - On Lovenox for DVT ppx  - Remainder of care per primary service.  Vascular Surgery service will follow.    History of Present Illness   Royce Rene is a 79 y.o. male who presents as a transfer for planned procedure. He has a history of right femoral endarterectomy in 2021 as well as history of right SFA angioplasty and stent.  He was admitted a few months ago with a gangrenous right great toe he underwent a right leg angiogram with recanalization of his right SFA stents as well as popliteal intervention.  He unfortunately has severe three-vessel tibial occlusive disease and it was not feasible to reestablish inline flow to his foot. He continues to have tissue loss on his right great toe and the distal portion of the toe has dry gangrene.  He is planned for angiography and limb flow procedure of his RLE on 1/2. He has a history of metastatic prostate cancer he is receiving chemotherapy, CAD s//p RCA and circumflex stent, HFrEF, HLD. He was admitted to [facility] on 12/26 for generalized weakness, failure to thrive, diarrhea. He is on ASA, statin, plavix as an outpatient. He denies nausea, vomiting, fevers, chills, chest pain, shortness of breath    Review of Systems  Review of systems negative except as per HPI.     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    Coronary artery disease 2001    S/p stenting to circumflex and RCA    HFrEF (heart failure with reduced ejection fraction) ([facility]) 2021    High cholesterol     Pacemaker     Prostate cancer ([facility])      Past Surgical History:   Procedure Laterality Date    CARDIAC CATHETERIZATION      CARDIAC ELECTROPHYSIOLOGY PROCEDURE N/A 2021    Procedure: Implant ICD - bi ventricular;  Surgeon: Jonas Oviedo MD;  Location: [facility] CARDIAC CATH LAB;  Service: Cardiology    COLONOSCOPY      IR LOWER EXTREMITY ANGIOGRAM  2023    IR LOWER EXTREMITY ANGIOGRAM  10/30/2024    NV TEAEC W/WO PATCH GRAFT COMMON FEMORAL Right 2021    Procedure: ENDARTERECTOMY ARTERIAL FEMORAL;  Surgeon: Serina Cook DO;  Location: [facility];  Service: Vascular     Social History     Tobacco Use    Smoking status: Former     Current packs/day: 0.00     Types: Cigarettes     Start date: 1962     Quit date: 2002     Years since quittin.5     Passive exposure: Past    Smokeless tobacco: Never   Vaping Use    Vaping status: Never Used   Substance and Sexual Activity    Alcohol use: Not Currently    Drug use: Not Currently    Sexual activity: Not on file     E-Cigarette/Vaping    E-Cigarette Use Never User      E-Cigarette/Vaping Substances    Nicotine No     THC No     CBD No     Flavoring No     Other No     Unknown No      Family History   Problem Relation Age of Onset    Cancer Mother      Social History     Tobacco Use    Smoking status: Former     Current packs/day: 0.00     Types: Cigarettes     Start date: 1962     Quit date: 2002     Years since quittin.5     Passive exposure: Past    Smokeless tobacco: Never   Vaping Use    Vaping status: Never Used   Substance and Sexual Activity    Alcohol use: Not Currently    Drug use: Not Currently    Sexual activity:  Not on file       Current Facility-Administered Medications:     acetaminophen (TYLENOL) tablet 650 mg, Q6H PRN    allopurinol (ZYLOPRIM) tablet 100 mg, Daily    aspirin chewable tablet 81 mg, Daily    atorvastatin (LIPITOR) tablet 80 mg, Daily With Dinner    bismuth subsalicylate (PEPTO BISMOL) oral suspension 524 mg, Q6H PRN    clopidogrel (PLAVIX) tablet 75 mg, Daily    Diclofenac Sodium (VOLTAREN) 1 % topical gel 2 g, 4x Daily    [Held by provider] Empagliflozin (JARDIANCE) tablet 10 mg, QAM    enoxaparin (LOVENOX) subcutaneous injection 40 mg, Daily    ezetimibe (ZETIA) tablet 10 mg, Daily    famotidine (PEPCID) tablet 20 mg, Daily    ferrous sulfate tablet 325 mg, Daily    finasteride (PROSCAR) tablet 5 mg, Daily    [Held by provider] furosemide (LASIX) tablet 40 mg, Daily    gabapentin (NEURONTIN) capsule 600 mg, BID    loperamide (IMODIUM) capsule 2 mg, 4x Daily PRN    [Held by provider] losartan (COZAAR) tablet 12.5 mg, Daily    menthol-methyl salicylate (BENGAY) 10-15 % cream, 4x Daily PRN    methocarbamol (ROBAXIN) tablet 500 mg, TID    metoprolol succinate (TOPROL-XL) 24 hr tablet 25 mg, Daily    ondansetron (ZOFRAN) injection 4 mg, Q6H PRN    oxyCODONE (ROXICODONE) IR tablet 5 mg, BID PRN    pentoxifylline (TRENtal) ER tablet 400 mg, TID With Meals    potassium chloride (Klor-Con M20) CR tablet 20 mEq, Daily    predniSONE tablet 5 mg, BID With Meals    tamsulosin (FLOMAX) capsule 0.4 mg, Daily With Dinner  Prior to Admission Medications   Prescriptions Last Dose Informant Patient Reported? Taking?   Accu-Chek FastClix Lancets MISC  Self Yes No   Alcohol Swabs (Alcohol Pads) 70 % PADS  Self Yes No   Sig: USE 2-3 TIMES AS DIRECTED.   Blood Glucose Monitoring Suppl (Accu-Chek Guide Me) w/Device KIT  Self No No   Sig: USE AS DIRECTED   Empagliflozin (Jardiance) 10 MG TABS tablet  Self No No   Sig: TAKE 1 TABLET (10 MG TOTAL) BY MOUTH EVERY MORNING   Lancet Devices (Lancing Device) MISC  Self No No   Sig:  TID   acetaminophen (TYLENOL) 500 mg tablet  Self No No   Sig: Take 2 tablets (1,000 mg total) by mouth 3 (three) times a day as needed for mild pain   allopurinol (ZYLOPRIM) 100 mg tablet   No No   Sig: Take 1 tablet (100 mg total) by mouth daily   aspirin 81 mg chewable tablet  Self No No   Sig: Chew 1 tablet (81 mg total) daily   atorvastatin (LIPITOR) 80 mg tablet  Self No No   Sig: Take 1 tablet (80 mg total) by mouth daily with dinner   bismuth subsalicylate (PEPTO BISMOL) 262 MG chewable tablet  Self Yes No   Sig: Chew 524 mg As needed   calcium gluconate 1-0.675 GM/50ML-% IVPB   No No   Sig: Inject 1 g into a catheter in a vein over 0.5 hours at 100 mL/hr once for 1 dose   clopidogrel (PLAVIX) 75 mg tablet  Self No No   Sig: Take 1 tablet (75 mg total) by mouth daily   cyanocobalamin (VITAMIN B-12) 1000 MCG tablet  Self No No   Sig: Take 1 tablet (1,000 mcg total) by mouth daily   ezetimibe (ZETIA) 10 mg tablet  Self No No   Sig: Take 1 tablet (10 mg total) by mouth daily   famotidine (PEPCID) 20 mg tablet  Self No No   Sig: Take 1 tablet (20 mg total) by mouth daily   ferrous sulfate 325 (65 Fe) mg tablet  Self No No   Sig: Take 1 tablet (325 mg total) by mouth daily   finasteride (PROSCAR) 5 mg tablet   No No   Sig: Take 1 tablet (5 mg total) by mouth daily   furosemide (LASIX) 20 mg tablet  Self No No   Sig: TAKE 2 TABLETS (40 MG TOTAL) BY MOUTH DAILY   gabapentin (NEURONTIN) 300 mg capsule  Self No No   Sig: Take 2 capsules (600 mg total) by mouth 2 (two) times a day   glucose blood (Accu-Chek Guide) test strip  Self No No   Sig: TEST 2-3 TIMES AS DIRECTED   levofloxacin (LEVAQUIN) 500 mg tablet  Self No No   Sig: Take 1 tablet (500 mg total) by mouth every 24 hours for 14 days   loperamide (IMODIUM) 2 mg capsule  Self No No   Sig: Take 1 capsule (2 mg total) by mouth 4 (four) times a day as needed for diarrhea   losartan (COZAAR) 25 mg tablet  Self No No   Sig: TAKE 0.5 TABLETS (12.5 MG TOTAL) BY MOUTH  DAILY   methocarbamol (ROBAXIN) 500 mg tablet   No No   Sig: TAKE 1 TABLET (500 MG TOTAL) BY MOUTH 3 (THREE) TIMES A DAY   metoprolol succinate (TOPROL-XL) 25 mg 24 hr tablet  Self No No   Sig: Take 1 tablet (25 mg total) by mouth daily   ondansetron (ZOFRAN) 4 mg tablet  Self No No   Sig: Take 1 tablet (4 mg total) by mouth every 8 (eight) hours as needed for nausea or vomiting   oxyCODONE (Roxicodone) 5 immediate release tablet  Self No No   Sig: Take 1 tablet (5 mg total) by mouth 2 (two) times a day as needed (wound pain) Max Daily Amount: 10 mg   pentoxifylline (TRENtal) 400 mg ER tablet  Self No No   Sig: Take 1 tablet (400 mg total) by mouth 3 (three) times a day with meals   polyethylene glycol (GLYCOLAX) 17 GM/SCOOP powder  Self No No   Sig: Take 17 g by mouth every other day Monday Wednesday Friday   potassium chloride (Klor-Con M20) 20 mEq tablet  Self No No   Sig: TAKE 1 TABLET (20 MEQ TOTAL) BY MOUTH DAILY   predniSONE 5 mg tablet  Self No No   Sig: TAKE 1 TABLET (5 MG TOTAL) BY MOUTH 2 (TWO) TIMES A DAY WITH MEALS   tamsulosin (FLOMAX) 0.4 mg   No No   Sig: Take 1 capsule (0.4 mg total) by mouth daily with dinner      Facility-Administered Medications: None     Patient has no known allergies.    Objective :  Temp:  [98.4 °F (36.9 °C)-99 °F (37.2 °C)] 98.8 °F (37.1 °C)  HR:  [] 104  BP: ()/(46-82) 135/82  Resp:  [17-20] 17  SpO2:  [90 %-99 %] 92 %    I/O         12/30 0701  12/31 0700 12/31 0701  01/01 0700 01/01 0701 01/02 0700    P.O.  180     Total Intake(mL/kg)  180 (1.8)     Urine (mL/kg/hr) 600 (0.3)      Stool 0      Total Output 600      Net -600 +180            Unmeasured Urine Occurrence 1 x 2 x     Unmeasured Stool Occurrence 2 x 1 x             Physical Exam:  General: No acute distress, alert and oriented  CV: Well perfused, regular rate  Lungs: Normal work of breathing, no increased respiratory effort  Abdomen: Soft, non-tender, non-distended  Extremities: No edema. Right  palpable femoral pulses. No palpable left femoral pulse. No palpable DP/PT bilaterally.  Skin: Warm, dry, right foot wound with dry gangrene of right toe, also with left foot wound        Lab Results: I have reviewed the following results:  Recent Labs     01/01/25  0515   WBC 13.78*   HGB 8.9*   HCT 27.7*   *   SODIUM 138   K 3.7   *   CO2 22   BUN 13   CREATININE 0.71   GLUC 87   CAIONIZED 1.11*       Imaging Results Review: No pertinent imaging studies reviewed.  Other Study Results Review: No additional pertinent studies reviewed.    VTE Prophylaxis: VTE covered by:  enoxaparin, Subcutaneous

## 2025-01-01 NOTE — QUICK NOTE
CTA reviewed and findings dicussed with patient. CT with right SFA stent occlusion and possible left SFA severe stenosis vs occlusion. Given these findings, will cancel planned RLE angio and limb flow procedure. Patient does not need to be NPO MN. Further surgical planning ongoing.    Joshua Amador MD  General Surgery   01/01/25

## 2025-01-01 NOTE — ASSESSMENT & PLAN NOTE
- Plan for OR 1/2 for RLE angio and limb flow procedure, possible LLE angio  - Podiatry consulted, appreciate recs  - NPO MN  - Continue ASA, statin, plavix  - On Lovenox for DVT ppx  - Remainder of care per primary service.

## 2025-01-01 NOTE — ASSESSMENT & PLAN NOTE
Wt Readings from Last 3 Encounters:   12/26/24 100 kg (220 lb 14.4 oz)   12/13/24 103 kg (227 lb)   12/12/24 103 kg (227 lb)   Home diuretic: PO Lasix 40mg QD will hold prior to procedure and resume  Continue metoprolol, losartan, Jardiance  Low-salt diet  Monitor I/O, daily weights

## 2025-01-01 NOTE — QUICK NOTE
Left voicemail for patient's son, Rodrigue, over the phone stating that patient will be transferred to [facility] at 0800.

## 2025-01-01 NOTE — ASSESSMENT & PLAN NOTE
- Plan for OR 1/2 for RLE angio and limb flow procedure  - NPO MN  - Continue ASA, statin, plavix  - On Lovenox for DVT ppx  - Remainder of care per primary service.

## 2025-01-01 NOTE — ASSESSMENT & PLAN NOTE
"Recent Labs     12/30/24  0300 12/31/24  0314 01/01/25  0515   SODIUM 140 138 138     No results found for: \"OSMOUA\", \"NAUR\", \"OSMOLALITSER\"    Resolved    "

## 2025-01-01 NOTE — ASSESSMENT & PLAN NOTE
Lab Results   Component Value Date    [id number] 1,528 (H) 12/28/2024     Stop home Vitamin B12 supplementation

## 2025-01-01 NOTE — DISCHARGE SUMMARY
Discharge Summary - Hospitalist   Name: Royce Rene 79 y.o. male I MRN: [id number]  Unit/Bed#: S -01 I Date of Admission: 12/26/2024   Date of Service: 1/1/2025 I Hospital Day: 6     Assessment & Plan  Diarrhea  Patient with episodes of diarrhea since receiving chemotherapy last week.  He also reports some nausea and vomiting.  Poor PO intake  Likely chemotherapy associated.  Improving  C. difficile and stool enteric panel negative  Supportive cares  Replete electrolytes  Imodium as needed  Urinary retention  Had to be straight cathed x 1  Started on Flomax and Finasteride with improvement in symptoms  Has been able to urinate on his own  Urinary retention protocol  Ambulatory referral to urology upon discharge  Right ankle pain  Patient reports right ankle pain  No obvious erythema swelling  X-ray reveals some sclerosis of calcaneus possible stress injury  CRP 79.1, uric acid 11.5.  Possible gout  Podiatry following, appreciate recommendations   Wounds do not appear to be acutely infected.  Agree with prednisone for acute gouty arthropathy.  No surgical plans  Started on allopurinol for hyperuricemia and possible gout    Chronic combined systolic and diastolic CHF (congestive heart failure) (HCC)  Wt Readings from Last 3 Encounters:   12/26/24 100 kg (220 lb 14.4 oz)   12/13/24 103 kg (227 lb)   12/12/24 103 kg (227 lb)     Home diuretic: PO Lasix 40mg QD - which was held during hospital stay.  Can resume  Continue metoprolol, losartan, Jardiance  Low-salt diet  Monitor I/O, daily weights  Prostate cancer (HCC)  History of prostate cancer follows with oncology receiving chemotherapy (last on 12/12)  Follows with Dr. Hernandez  Current regimen is Cabazitaxel, Neulasta, prednisone, Lupron, denosumab  Outpatient oncology inputs noted  Outpatient follow-up  CAD (coronary artery disease)  Continue aspirin, metoprolol, atorvastatin  PAD (peripheral artery disease) (HCC)  History of extensive peripheral arterial  disease  Patient with gangrene of right toe  Continue aspirin, Plavix, atorvastatin, pentoxifylline  Outpatient vascular surgery follow-up.  Plan for intervention on 1/2/2025  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)  See plan under prostate cancer  Cancer-related pain  Oxycodone as needed  Supportive cares  Currently well controlled  Ambulatory dysfunction  Baseline- close to wheelchair bounded- minimal walk.  Fall precautions  PT recommended level II rehab.  Patient is amenable.  CM aware.  Disposition pending placement  Electrolyte imbalance  Lab Results   Component Value Date    SODIUM 138 01/01/2025    K 3.7 01/01/2025    MG 1.9 12/29/2024    PHOS 2.3 12/29/2024    CAIONIZED 1.11 (L) 01/01/2025     With multiple electrolyte imbalances upon arrival   Replete and monitor    Hyponatremia  Lab Results   Component Value Date    SODIUM 138 01/01/2025     Na 132 on arrival.  Hypovolemic hyponatremia.  Resolved  Likely multifactorial with dehydration contributing  Improved with IV fluids and PO intake  Resolved  Class 1 obesity due to excess calories with body mass index (BMI) of 33.0 to 33.9 in adult  Therapeutic lifestyle modification encouraged  Dry gangrene (HCC)  Dry gangrene right big toe with history of peripheral disease  Vascular surgery plans for outpatient follow-up noted    Ulcer of left foot, limited to breakdown of skin (HCC)  Pic under media.  Low suspicion for active infection  Per podiatry, no surgical plans  Iron deficiency anemia  Lab Results   Component Value Date    IRON 21 (L) 12/28/2024    FERRITIN 1,012 (H) 12/28/2024     Continue iron tablet 325 mg  Vitamin B12 deficiency  Lab Results   Component Value Date    JBILZSDZ84 1,528 (H) 12/28/2024     Stop home Vitamin B12 supplementation  Hyperuricemia  Lab Results   Component Value Date    URICACID 11.5 (H) 12/26/2024     Started on allopurinol 100 mg daily     Medical Problems       Resolved Problems  Date Reviewed: 1/1/2025   None        Discharging Physician / Practitioner: Na Solomon DO  PCP: Anne Chandra MD  Admission Date:   Admission Orders (From admission, onward)       Ordered        12/26/24 1506  INPATIENT ADMISSION  Once                          Discharge/Transfer Date: 01/01/25    Disposition:   Transfer to: \[facility]\""   Reason for Transfer: LimFlow procedure to RLE  Accepting Provider at Receiving [place]: Dr. Partida    Consultations During Hospital Stay:  Podiatry    Procedures Performed:   None    Significant Findings / Test Results:   XR foot 3+ views RIGHT   Final Result by Christophe Olivera MD (12/26 1512)      No x-ray evidence of osteomyelitis at this time.      Some sclerosis of the calcaneus is identified of indeterminate etiology. This would be an unusual location for metastatic disease. Subtle stress injury is possible.      The study was marked in EPIC for immediate notification.         Computerized Assisted Algorithm (CAA) may have been used to analyze all applicable images.         Workstation performed: [id number]           Incidental Findings:   XR foot 3+ views RIGHT: No x-ray evidence of osteomyelitis at this time., Some sclerosis of the calcaneus is identified of indeterminate etiology. This would be an unusual location for metastatic disease. Subtle stress injury is possible., The study was marked in EPIC for immediate notification., Computerized Assisted Algorithm (CAA) may have been used to analyze all applicable images., Workstation performed: [id number]    I reviewed the above mentioned incidental findings with the patient and/or family and they expressed understanding.    Test Results Pending at Discharge (will require follow up):   None     Outpatient Tests Requested:  Repeat CBC and BMP in 1 week    Complications:  None    Reason for Admission: Generalized weakness, N/V/D, poor oral intake    Hospital Course:   Royce Rene is a 79 y.o. male patient who originally presented to the hospital on  "12/26/2024 due to generalized weakness, nausea, vomiting, diarrhea, and poor oral intake after recent chemotherapy session.  He was treated conservatively with intravenous fluids and electrolyte repletion.  C. difficile and stool enteric panel was negative.  He also had right ankle pain and difficulty bearing weight for which podiatry was consulted.  They did not feel that wounds were acutely infected.  Patient was on levofloxacin prior to hospital admission, which was subsequently discontinued.  He had mild leukocytosis and elevated CRP and uric acid, which likely was due to gout, and was started on allopurinol.  X-ray of the right foot was negative for osteomyelitis.  PT recommended level II. However, due to vascular surgery procedure on 1/2/2025, plan for transfer to [facility] with subsequent rehab planning afterwards.    Please see above list of diagnoses and related plan for additional information.     Condition at Discharge: stable    Discharge Day Visit / Exam:   Subjective: Patient was seen and examined at bedside.  Diarrhea has improved.  He denies any abdominal pain, nausea, vomiting.  He agrees with plan for transfer to [facility] at 0800 this morning.    Vitals: Blood Pressure: 97/58 (01/01/25 0753)  Pulse: 98 (01/01/25 0753)  Temperature: 99 °F (37.2 °C) (01/01/25 0753)  Temp Source: Oral (12/31/24 0702)  Respirations: 17 (01/01/25 0753)  Height: 5' 8\" (172.7 cm) (12/26/24 2129)  Weight - Scale: 100 kg (220 lb 14.4 oz) (12/26/24 2129)  SpO2: 95 % (01/01/25 0753)  Physical Exam  Constitutional:       General: He is not in acute distress.     Appearance: Normal appearance. He is obese. He is ill-appearing. He is not toxic-appearing.   HENT:      Head: Normocephalic and atraumatic.      Mouth/Throat:      Mouth: Mucous membranes are moist.   Eyes:      Extraocular Movements: Extraocular movements intact.   Cardiovascular:      Rate and Rhythm: Normal rate and regular rhythm.   Pulmonary:      Effort: No respiratory " distress.      Breath sounds: No wheezing, rhonchi or rales.   Abdominal:      General: Bowel sounds are normal. There is distension.      Palpations: Abdomen is soft.      Tenderness: There is no abdominal tenderness. There is no guarding or rebound.   Musculoskeletal:         General: Tenderness (right foot) present. No swelling.      Cervical back: Normal range of motion and neck supple.   Skin:     General: Skin is warm.      Capillary Refill: Capillary refill takes less than 2 seconds.      Findings: Lesion (RLE - see media) present.   Neurological:      General: No focal deficit present.      Mental Status: He is alert and oriented to person, place, and time.     Discussion with Family: Attempted to update  (son) via phone. Left voicemail.      Administrative Statements   Discharge Statement:  I have spent a total time of 30 minutes in caring for this patient on the day of the visit/encounter. .    **Please Note: This note may have been constructed using a voice recognition system.**

## 2025-01-01 NOTE — ASSESSMENT & PLAN NOTE
Lab Results   Component Value Date    URICACID 11.5 (H) 12/26/2024       Patient reports right ankle pain  No obvious erythema swelling  X-ray reveals some sclerosis of calcaneus possible stress injury  CRP 79.1, uric acid 11.5.  Possible gout  Podiatry following, appreciate recommendations   Wounds do not appear to be acutely infected.  Agree with prednisone for acute gouty arthropathy.  No surgical plans  Supportive cares  Started on allopurinol

## 2025-01-01 NOTE — ASSESSMENT & PLAN NOTE
Lab Results   Component Value Date    HGB 8.9 (L) 01/01/2025    HGB 8.6 (L) 12/31/2024    HGB 9.0 (L) 12/29/2024    HGB 8.9 (L) 12/28/2024    HGB 9.3 (L) 12/27/2024    CONCFE 10 (L) 12/28/2024    IRON 21 (L) 12/28/2024    FERRITIN 1,012 (H) 12/28/2024    TIBC 214.2 (L) 12/28/2024     Continue iron supplementation

## 2025-01-01 NOTE — ASSESSMENT & PLAN NOTE
Lab Results   Component Value Date    SODIUM 138 01/01/2025     Na 132 on arrival.  Hypovolemic hyponatremia.  Resolved  Likely multifactorial with dehydration contributing  Improved with IV fluids and PO intake  Resolved

## 2025-01-01 NOTE — CONSULTS
Podiatry - Consultation    Patient Information:   Royce Rene 79 y.o. male MRN: [id number]  Unit/Bed#: [facility] 523-01 Encounter: [id number]  PCP: Anne Chandra MD  Date of Admission:  1/1/2025  Date of Consultation: 01/01/25  Requesting Physician: Tato Nixon, *      ASSESSMENT:    Royce Rene is a 79 y.o. male with:    Right great toe dry gangrene  Ulceration left lateral midfoot   Severe peripheral vascular disease    PLAN:    Patient was seen and evaluated at bedside. Right great toe noted to be gangrenous however dry and stable. Left midfoot wound noted to be stable. No concern for acute infection.   Right foot radiographs reviewed from 12/26. Agree with the final read, no evidence of osteomyelitis at this time.   Left foot radiographs ordered. Follow up final read.   Vascular surgery notes reviewed, plans for OR 1/2 for RLE angiogram and limbflo procedure and possible LLE angiogram.   Podiatry to follow for local wound care at this time. Tentative plans for surgical intervention pending vascular surgery recommendations.   H&P reviewed, despite patient not complaining of right ankle pain at time of evaluation, given uric acid level is 11.5 agree with prednisone 5 mg twice daily for suspected acute gouty arthropathy.   Labs and vitals reviewed. Patient is afebrile with slight leukocytosis noted. Will continue to monitor and trend.    Local wound care consisting of betadine, DSD to the right hallux and left foot wound.   Wound care instructions placed - appreciate nursing assistance with dressing changes.   Elevation on green foam wedges or pillows when non-ambulatory  Rest of care per primary team.  Will discuss this plan with my attending and update as needed.    Weightbearing status: WBAT to bilateral lower extremities     SUBJECTIVE:    History of Present Illness:    Royce Rene is a 79 y.o. male who is originally admitted 1/1/2025 due to need for vascular intervention after being transferred from  [facility]. Patient has a past medical history of PAD, CHF systolic and diastolic, diarrhea, prostate cancer, who presents with poor healing wounds.    We are consulted for patient's bilateral foot wounds.  Patient reports that he has had the wounds for quite a few months now and most recently has seen Dr. Montes for the wounds.  He states that the right great toe has gotten progressively more black in nature.      Patient states that he has significant pain in his both his feet that is likely due to lack of blood flow to the areas.  Patient follows with Dr. Harper for vascular management.    Patient denies nausea, vomiting, fever, chills, shortness of breath, chest pain.     Review of Systems:    Constitutional: Negative.    HENT: Negative.    Eyes: Negative.    Respiratory: Negative.    Cardiovascular: Negative.    Gastrointestinal: Negative.    Musculoskeletal: Bilateral foot pain    Skin: Bilateral foot wounds    Neurological: Tingling to bilateral feet    Psych: Negative.     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    Coronary artery disease 2001    S/p stenting to circumflex and RCA    HFrEF (heart failure with reduced ejection fraction) ([facility]) 06/14/2021    High cholesterol     Pacemaker     Prostate cancer ([facility])        Past Surgical History:   Procedure Laterality Date    CARDIAC CATHETERIZATION      CARDIAC ELECTROPHYSIOLOGY PROCEDURE N/A 12/23/2021    Procedure: Implant ICD - bi ventricular;  Surgeon: Jonas Oviedo MD;  Location: [facility] CARDIAC CATH LAB;  Service: Cardiology    COLONOSCOPY      IR LOWER EXTREMITY ANGIOGRAM  04/18/2023    IR LOWER EXTREMITY ANGIOGRAM  10/30/2024    OR TEAEC W/WO PATCH GRAFT COMMON FEMORAL Right 07/09/2021    Procedure: ENDARTERECTOMY ARTERIAL FEMORAL;  Surgeon: Serina Cook DO;  Location: [facility];  Service: Vascular       Meds/Allergies:      Medications Prior to Admission:      Accu-Chek FastClix Lancets MISC    acetaminophen (TYLENOL) 500 mg tablet    Alcohol Swabs (Alcohol Pads) 70 % PADS    allopurinol (ZYLOPRIM) 100 mg tablet    aspirin 81 mg chewable tablet    atorvastatin (LIPITOR) 80 mg tablet    bismuth subsalicylate (PEPTO BISMOL) 262 MG chewable tablet    Blood Glucose Monitoring Suppl (Accu-Chek Guide Me) w/Device KIT    calcium gluconate 1-0.675 GM/50ML-% IVPB    clopidogrel (PLAVIX) 75 mg tablet    Empagliflozin (Jardiance) 10 MG TABS tablet    ezetimibe (ZETIA) 10 mg tablet    famotidine (PEPCID) 20 mg tablet    ferrous sulfate 325 (65 Fe) mg tablet    finasteride (PROSCAR) 5 mg tablet    furosemide (LASIX) 20 mg tablet    gabapentin (NEURONTIN) 300 mg capsule    glucose blood (Accu-Chek Guide) test strip    Lancet Devices (Lancing Device) MISC    loperamide (IMODIUM) 2 mg capsule    losartan (COZAAR) 25 mg tablet    methocarbamol (ROBAXIN) 500 mg tablet    metoprolol succinate (TOPROL-XL) 25 mg 24 hr tablet    ondansetron (ZOFRAN) 4 mg tablet    oxyCODONE (Roxicodone) 5 immediate release tablet    pentoxifylline (TRENtal) 400 mg ER tablet    polyethylene glycol (GLYCOLAX) 17 GM/SCOOP powder    potassium chloride (Klor-Con M20) 20 mEq tablet    predniSONE 5 mg tablet    tamsulosin (FLOMAX) 0.4 mg    No Known Allergies    Social History:     Marital Status:     Substance Use History:   Social History     Substance and Sexual Activity   Alcohol Use Not Currently     Social History     Tobacco Use   Smoking Status Former    Current packs/day: 0.00    Types: Cigarettes    Start date: 1962    Quit date: 2002    Years since quittin.5    Passive exposure: Past   Smokeless Tobacco Never     Social History     Substance and Sexual Activity   Drug Use Not Currently       Family History:    Family History   Problem Relation Age of Onset    Cancer Mother          OBJECTIVE:    Vitals:   Blood Pressure: 135/82 (25 0848)  Pulse: 104 (25 0848)  Temperature:  98.8 °F (37.1 °C) (01/01/25 0848)  SpO2: 92 % (01/01/25 0848)    Physical Exam:    General Appearance: Alert, cooperative, no distress.  HEENT: Head normocephalic, atraumatic, without obvious abnormality.  Heart: Normal rate and rhythm.  Lungs: Non-labored breathing. No respiratory distress.  Abdomen: Without distension.  Psychiatric: AAOx3  Lower Extremity:  Vascular:   Right DP and PT pulses are absent.   Left DP and PT pulses are absent.   CRT < 3 seconds at the digits.   +1/4 edema noted at bilateral lower extremities. Pedal hair is absent.   Skin temperature is cool right > left.     Musculoskeletal:  MMT is 5/5 in all muscle compartments bilaterally. ROM at the 1st MPJ and ankle joint are reduced bilaterally with the leg extended.   Pain on palpation of bilateral feet.   No gross deformities noted.   No prior amputations     Dermatological:  Lower extremity wound(s) as noted below:    Wound #: 1  Location: Left lateral midfoot   Length 4.0 cm: Width  3.0 cm: Depth 0.4 cm:   Deepest Tissue Noted in Base: subcutaneous   Probe to Bone: No  Peripheral Skin Description: Attached  Granulation: 0% Fibrotic Tissue: 0% Necrotic Tissue: 100%   Drainage Amount:  None  Signs of Infection: No      Left lower extremity: Gangrenous changes noted to left digits 1, 2 and 3 in the interdigital spaces     Right lower extremity: Right hallux noted to be dry and stable with gangrenous changes. No signs of active infection: no purulence, no malodor, no ascending erythema, no crepitus, no fluctuance. Mottled appearance to right forefoot. Blister formation to dorsal 2nd and 3rd digits.     Neurological:  Gross sensation is intact.   Protective sensation is diminished.   Patient Reports numbness and/or paresthesias.    Clinical Images 01/01/25:    Left foot:              Right foot:             Additional data:     Lab Results: I have personally reviewed pertinent labs including:    Results from last 7 days   Lab Units 01/01/25  0574  "12/28/24  0528 12/27/24  0608   WBC Thousand/uL 13.78*   < > 11.90*   HEMOGLOBIN g/dL 8.9*   < > 9.3*   HEMATOCRIT % 27.7*   < > 29.2*   PLATELETS Thousands/uL 400*   < > 281   SEGS PCT %  --   --  83*   LYMPHO PCT %  --   --  7*   MONO PCT %  --   --  9   EOS PCT %  --   --  0    < > = values in this interval not displayed.     Results from last 7 days   Lab Units 01/01/25  0515 12/28/24  0528 12/27/24  0608   POTASSIUM mmol/L 3.7   < > 3.3*   CHLORIDE mmol/L 109*   < > 107   CO2 mmol/L 22   < > 20*   BUN mg/dL 13   < > 13   CREATININE mg/dL 0.71   < > 0.78   CALCIUM mg/dL 8.1*   < > 7.0*   ALK PHOS U/L  --   --  70   ALT U/L  --   --  11   AST U/L  --   --  15    < > = values in this interval not displayed.           Cultures: I have personally reviewed pertinent cultures including:    Results from last 7 days   Lab Units 12/27/24  1326   C DIFF TOXIN B BY PCR  Negative           Imaging: I have personally reviewed pertinent reports in PACS.  EKG, Pathology, and Other Studies: I have personally reviewed pertinent reports.        ** Please Note: Portions of the record may have been created with voice recognition software. Occasional wrong word or \"sound a like\" substitutions may have occurred due to the inherent limitations of voice recognition software. Read the chart carefully and recognize, using context, where substitutions have occurred. **    "

## 2025-01-01 NOTE — ASSESSMENT & PLAN NOTE
Patient reports right ankle pain  No obvious erythema swelling  X-ray reveals some sclerosis of calcaneus possible stress injury  CRP 79.1, uric acid 11.5.  Possible gout  Podiatry following, appreciate recommendations   Wounds do not appear to be acutely infected.  Agree with prednisone for acute gouty arthropathy.  No surgical plans  Started on allopurinol for hyperuricemia and possible gout

## 2025-01-01 NOTE — ASSESSMENT & PLAN NOTE
Lab Results   Component Value Date    SODIUM 138 01/01/2025    K 3.7 01/01/2025    MG 1.9 12/29/2024    PHOS 2.3 12/29/2024    CAIONIZED 1.11 (L) 01/01/2025     With multiple electrolyte imbalances upon arrival   Replete and monitor

## 2025-01-01 NOTE — ASSESSMENT & PLAN NOTE
Had to be straight cathed x 1  Started on Flomax and Finasteride with improvement in symptoms  Has been able to urinate on his own  Urinary retention protocol  Ambulatory referral to urology upon discharge

## 2025-01-01 NOTE — ASSESSMENT & PLAN NOTE
Presents with generalized weakness, failure to thrive, poor appetite, episodes of loose stools.  Denies fever, chills, sweats, constitutional symptoms  Likely multifactorial - chemotherapy, and V/D, poor oral intake  Supportive cares  PT recommended level II    Plan:  Medically cleared for discharge  Canceled plan for rehab placement  Plan for transfer from [facility] to [facility] for vascular surgery procedure on 1/2/25. Vascular surgery Dr. Harper agrees with plan.  Accepting [facility] hospitalist is Dr. Partida

## 2025-01-01 NOTE — H&P
H&P - Hospitalist   Name: Royce Rene 79 y.o. male I MRN: [id number]  Unit/Bed#: [facility] 523-01 I Date of Admission: 1/1/2025   Date of Service: 1/1/2025 I Hospital Day: 0     Assessment & Plan  PAD (peripheral artery disease) (HCC)    Right foot great toe gangrene with concern for underlying osteomyelitis  Severe peripheral vascular disease  Ulceration left foot midfoot lateral  History of extensive peripheral arterial disease  Podiatry and Vascular onboard  Continue aspirin, Plavix, atorvastatin, pentoxifylline  Transferred to [place] on 1/1/2025. Plan for limbflow procedure by vascular surgery.   Ambulatory dysfunction  Baseline- close to wheelchair bounded- minimal walk.  Fall precautions  PT in [facility] recommended level II rehab.  Patient is amenable.   Will find placement after procedure  Chronic combined systolic and diastolic CHF (congestive heart failure) (HCC)  Wt Readings from Last 3 Encounters:   12/26/24 100 kg (220 lb 14.4 oz)   12/13/24 103 kg (227 lb)   12/12/24 103 kg (227 lb)   Home diuretic: PO Lasix 40mg QD will hold prior to procedure and resume  Continue metoprolol, losartan, Jardiance  Low-salt diet  Monitor I/O, daily weights          Prostate cancer (HCC)  History of prostate cancer follows with oncology receiving chemotherapy (last on 12/12)  Follows with Dr. Hernandez  Current regimen is Cabazitaxel, Neulasta, prednisone, Lupron, denosumab  Continue to follow in outpatient setting  Diarrhea  Patient with episodes of diarrhea since receiving chemotherapy last week.  He also reports some nausea and vomiting.  Poor PO intake  Likely chemotherapy associated.  Improving  C. difficile and stool enteric panel negative  Supportive cares  Replete electrolytes  Imodium as needed  CAD (coronary artery disease)  Continue aspirin, metoprolol, atorvastatin  Urinary retention  Had to be straight cathed x 1  Started on Flomax and Finasteride with improvement in symptoms  Has been able to urinate on his  "own  Urinary retention protocol  Outpatient followup with Urology  Right ankle pain  Lab Results   Component Value Date    URICACID 11.5 (H) 12/26/2024       Patient reports right ankle pain  No obvious erythema swelling  X-ray reveals some sclerosis of calcaneus possible stress injury  CRP 79.1, uric acid 11.5.  Possible gout  Podiatry following, appreciate recommendations   Wounds do not appear to be acutely infected.  Agree with prednisone for acute gouty arthropathy.  No surgical plans  Supportive cares  Started on allopurinol  Ulcer of left foot, limited to breakdown of skin (HCC)  Pic under media.  Low suspicion for active infection  Per podiatry, no surgical plans      Hyponatremia  Recent Labs     12/30/24  0300 12/31/24  0314 01/01/25  0515   SODIUM 140 138 138     No results found for: \"OSMOUA\", \"NAUR\", \"OSMOLALITSER\"    Resolved    Iron deficiency anemia  Lab Results   Component Value Date    HGB 8.9 (L) 01/01/2025    HGB 8.6 (L) 12/31/2024    HGB 9.0 (L) 12/29/2024    HGB 8.9 (L) 12/28/2024    HGB 9.3 (L) 12/27/2024    CONCFE 10 (L) 12/28/2024    IRON 21 (L) 12/28/2024    FERRITIN 1,012 (H) 12/28/2024    TIBC 214.2 (L) 12/28/2024     Continue iron supplementation        VTE Pharmacologic Prophylaxis: VTE Score: 6 High Risk (Score >/= 5) - Pharmacological DVT Prophylaxis Ordered: enoxaparin (Lovenox). Sequential Compression Devices Ordered.  Code Status: Level 1 - Full Code   Discussion with family: Attempted to update  (brother) via phone. Unable to contact.    Anticipated Length of Stay: Patient will be admitted on an inpatient basis with an anticipated length of stay of greater than 2 midnights secondary to Vascular surgery.    History of Present Illness   Chief Complaint: Vascular surgery, foot ulcer    Royce Rene is a 79 y.o. male with a PMH of PAD, CHF systolic and diastolic, diarrhea, prostate cancer, who presents with poor healing wounds..  Patient was recently admitted to [facility]" where he was treated for failure to thrive and generalized weakness after chemotherapy.  This is resolved and patient was pending placement to rehab facility when patient was noted to have gangrene on the right foot and poor healing wound on the left, noted to have history of PAD, podiatry was on board and did not note any infection.  Did discuss with vascular advised transfer to [place] for vascular surgery.  Will get them on board.    Review of Systems   Constitutional:  Negative for chills and fever.   HENT:  Negative for ear pain and sore throat.    Eyes:  Negative for pain and visual disturbance.   Respiratory:  Negative for cough and shortness of breath.    Cardiovascular:  Negative for chest pain and palpitations.   Gastrointestinal:  Negative for abdominal pain and vomiting.   Genitourinary:  Negative for dysuria and hematuria.   Musculoskeletal:  Negative for arthralgias and back pain.   Skin:  Negative for color change and rash.   Neurological:  Negative for seizures and syncope.   All other systems reviewed and are negative.      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    Coronary artery disease 2001    S/p stenting to circumflex and RCA    HFrEF (heart failure with reduced ejection fraction) ([facility]) 06/14/2021    High cholesterol     Pacemaker     Prostate cancer ([facility])      Past Surgical History:   Procedure Laterality Date    CARDIAC CATHETERIZATION      CARDIAC ELECTROPHYSIOLOGY PROCEDURE N/A 12/23/2021    Procedure: Implant ICD - bi ventricular;  Surgeon: Jonas Oviedo MD;  Location: [facility] CARDIAC [facility] LAB;  Service: Cardiology    COLONOSCOPY      IR LOWER EXTREMITY ANGIOGRAM  04/18/2023    IR LOWER EXTREMITY ANGIOGRAM  10/30/2024    MS TEAEC W/WO PATCH GRAFT COMMON FEMORAL Right 07/09/2021    Procedure: ENDARTERECTOMY ARTERIAL FEMORAL;  Surgeon: Serina Cook DO;  Location: [facility];  Service: Vascular     Social History     Tobacco  Use    Smoking status: Former     Current packs/day: 0.00     Types: Cigarettes     Start date: 1962     Quit date: 2002     Years since quittin.5     Passive exposure: Past    Smokeless tobacco: Never   Vaping Use    Vaping status: Never Used   Substance and Sexual Activity    Alcohol use: Not Currently    Drug use: Not Currently    Sexual activity: Not on file     E-Cigarette/Vaping    E-Cigarette Use Never User      E-Cigarette/Vaping Substances    Nicotine No     THC No     CBD No     Flavoring No     Other No     Unknown No      Family history non-contributory  Social History:  Marital Status:    Occupation: none  Patient Pre-hospital Living Situation: Home  Patient Pre-hospital Level of Mobility: walks  Patient Pre-hospital Diet Restrictions: None    Meds/Allergies   I have reviewed home medications with patient personally.  Prior to Admission medications    Medication Sig Start Date End Date Taking? Authorizing Provider   Accu-Chek FastClix Lancets MISC  24   Historical Provider, MD   acetaminophen (TYLENOL) 500 mg tablet Take 2 tablets (1,000 mg total) by mouth 3 (three) times a day as needed for mild pain 3/23/23   Gavin Andrew MD   Alcohol Swabs (Alcohol Pads) 70 % PADS USE 2-3 TIMES AS DIRECTED. 24   Historical Provider, MD   allopurinol (ZYLOPRIM) 100 mg tablet Take 1 tablet (100 mg total) by mouth daily 25   Na Solomon DO   aspirin 81 mg chewable tablet Chew 1 tablet (81 mg total) daily 24   Anne Chandra MD   atorvastatin (LIPITOR) 80 mg tablet Take 1 tablet (80 mg total) by mouth daily with dinner 24   Anne Chandra MD   bismuth subsalicylate (PEPTO BISMOL) 262 MG chewable tablet Chew 524 mg As needed    Historical Provider, MD   Blood Glucose Monitoring Suppl (Accu-Chek Guide Me) w/Device KIT USE AS DIRECTED 24   Amanda Keenan MD   calcium gluconate 1-0.675 GM/50ML-% IVPB Inject 1 g into a catheter in a vein over 0.5 hours at  100 mL/hr once for 1 dose 1/1/25 1/1/25  Na Solomon DO   clopidogrel (PLAVIX) 75 mg tablet Take 1 tablet (75 mg total) by mouth daily 11/14/24   Anne Chandra MD   Empagliflozin (Jardiance) 10 MG TABS tablet TAKE 1 TABLET (10 MG TOTAL) BY MOUTH EVERY MORNING 8/23/24   Kelsy Trejo MD   ezetimibe (ZETIA) 10 mg tablet Take 1 tablet (10 mg total) by mouth daily 9/16/24 3/15/25  Kelsy Trejo MD   famotidine (PEPCID) 20 mg tablet Take 1 tablet (20 mg total) by mouth daily 3/14/23 12/19/24  Silverio Bhandari MD   ferrous sulfate 325 (65 Fe) mg tablet Take 1 tablet (325 mg total) by mouth daily 11/14/24   Anne Chandra MD   finasteride (PROSCAR) 5 mg tablet Take 1 tablet (5 mg total) by mouth daily 1/1/25   Na Solomon DO   furosemide (LASIX) 20 mg tablet TAKE 2 TABLETS (40 MG TOTAL) BY MOUTH DAILY 11/15/24   Kelsy Trejo MD   gabapentin (NEURONTIN) 300 mg capsule Take 2 capsules (600 mg total) by mouth 2 (two) times a day 4/15/24   Gavin Andrew MD   glucose blood (Accu-Chek Guide) test strip TEST 2-3 TIMES AS DIRECTED 5/16/24   Kelsy Trejo MD   Lancet Devices (Lancing Device) MISC TID 9/12/23   Amanda Keenan MD   loperamide (IMODIUM) 2 mg capsule Take 1 capsule (2 mg total) by mouth 4 (four) times a day as needed for diarrhea 3/14/23   Silverio Bhandari MD   losartan (COZAAR) 25 mg tablet TAKE 0.5 TABLETS (12.5 MG TOTAL) BY MOUTH DAILY 6/18/24   Kelsy Trejo MD   methocarbamol (ROBAXIN) 500 mg tablet TAKE 1 TABLET (500 MG TOTAL) BY MOUTH 3 (THREE) TIMES A DAY 12/19/24   OMAIRA Carmona   metoprolol succinate (TOPROL-XL) 25 mg 24 hr tablet Take 1 tablet (25 mg total) by mouth daily 5/30/24   Kelsy Trejo MD   ondansetron (ZOFRAN) 4 mg tablet Take 1 tablet (4 mg total) by mouth every 8 (eight) hours as needed for nausea or vomiting 7/26/24   Jung Hernandez MD   oxyCODONE (Roxicodone) 5 immediate release tablet Take 1 tablet (5 mg total) by mouth  2 (two) times a day as needed (wound pain) Max Daily Amount: 10 mg 12/3/24   Christine OMAIRA Mcgraw   pentoxifylline (TRENtal) 400 mg ER tablet Take 1 tablet (400 mg total) by mouth 3 (three) times a day with meals 11/3/24 12/19/24  OMAIRA Hamm   polyethylene glycol (GLYCOLAX) 17 GM/SCOOP powder Take 17 g by mouth every other day Monday Wednesday Friday 5/13/24   Gavin Andrew MD   potassium chloride (Klor-Con M20) 20 mEq tablet TAKE 1 TABLET (20 MEQ TOTAL) BY MOUTH DAILY 10/9/24 4/7/25  Anne Chandra MD   predniSONE 5 mg tablet TAKE 1 TABLET (5 MG TOTAL) BY MOUTH 2 (TWO) TIMES A DAY WITH MEALS 11/18/24   Jung Hernandez MD   tamsulosin (FLOMAX) 0.4 mg Take 1 capsule (0.4 mg total) by mouth daily with dinner 1/1/25   Na Solomon DO   cyanocobalamin (VITAMIN B-12) 1000 MCG tablet Take 1 tablet (1,000 mcg total) by mouth daily 11/14/24 1/1/25  Anne Chandra MD   levofloxacin (LEVAQUIN) 500 mg tablet Take 1 tablet (500 mg total) by mouth every 24 hours for 14 days 12/12/24 1/1/25  Zachery Montes DPM     No Known Allergies    Objective :  Temp:  [98.4 °F (36.9 °C)-99 °F (37.2 °C)] 98.8 °F (37.1 °C)  HR:  [] 104  BP: ()/(46-82) 135/82  Resp:  [17-20] 17  SpO2:  [90 %-99 %] 92 %    Physical Exam  Vitals and nursing note reviewed.   Constitutional:       Appearance: He is well-developed and normal weight.   HENT:      Head: Normocephalic and atraumatic.      Nose: Nose normal.      Mouth/Throat:      Mouth: Mucous membranes are moist.   Eyes:      Conjunctiva/sclera: Conjunctivae normal.   Cardiovascular:      Rate and Rhythm: Normal rate and regular rhythm.      Heart sounds: Normal heart sounds. No murmur heard.  Pulmonary:      Effort: Pulmonary effort is normal. No respiratory distress.      Breath sounds: Normal breath sounds.   Abdominal:      General: Abdomen is flat.      Palpations: Abdomen is soft.      Tenderness: There is no abdominal tenderness.    Musculoskeletal:         General: No swelling.      Cervical back: Neck supple.      Right lower leg: No edema.      Left lower leg: No edema.   Skin:     General: Skin is warm and dry.      Capillary Refill: Capillary refill takes less than 2 seconds.   Neurological:      General: No focal deficit present.      Mental Status: He is alert and oriented to person, place, and time. Mental status is at baseline.   Psychiatric:         Mood and Affect: Mood normal.          Lines/Drains:      Central Line:  Goal for removal: N/A - Chronic PICC             Lab Results: I have reviewed the following results:  Results from last 7 days   Lab Units 01/01/25  0515 12/28/24  0528 12/27/24  0608   WBC Thousand/uL 13.78*   < > 11.90*   HEMOGLOBIN g/dL 8.9*   < > 9.3*   HEMATOCRIT % 27.7*   < > 29.2*   PLATELETS Thousands/uL 400*   < > 281   SEGS PCT %  --   --  83*   LYMPHO PCT %  --   --  7*   MONO PCT %  --   --  9   EOS PCT %  --   --  0    < > = values in this interval not displayed.     Results from last 7 days   Lab Units 01/01/25  0515 12/28/24  0528 12/27/24  0608   SODIUM mmol/L 138   < > 137   POTASSIUM mmol/L 3.7   < > 3.3*   CHLORIDE mmol/L 109*   < > 107   CO2 mmol/L 22   < > 20*   BUN mg/dL 13   < > 13   CREATININE mg/dL 0.71   < > 0.78   ANION GAP mmol/L 7   < > 10   CALCIUM mg/dL 8.1*   < > 7.0*   ALBUMIN g/dL  --   --  3.4*   TOTAL BILIRUBIN mg/dL  --   --  0.54   ALK PHOS U/L  --   --  70   ALT U/L  --   --  11   AST U/L  --   --  15   GLUCOSE RANDOM mg/dL 87   < > 93    < > = values in this interval not displayed.             Lab Results   Component Value Date    HGBA1C 5.5 10/23/2024     Results from last 7 days   Lab Units 12/26/24  1155   LACTIC ACID mmol/L 1.8       Imaging Results Review: I personally reviewed the following image studies/reports in PACS and discussed pertinent findings with Radiology: CT abdomen/pelvis. My interpretation of the radiology images/reports is: Above.  Other Study Results  Review: EKG was reviewed.     Administrative Statements   I have spent a total time of 80 minutes in caring for this patient on the day of the visit/encounter including Diagnostic results, Prognosis, Risks and benefits of tx options, Instructions for management, Patient and family education, Importance of tx compliance, Risk factor reductions, Impressions, Counseling / Coordination of care, Documenting in the medical record, Reviewing / ordering tests, medicine, procedures  , Obtaining or reviewing history  , and Communicating with other healthcare professionals .    ** Please Note: This note has been constructed using a voice recognition system. **

## 2025-01-01 NOTE — PLAN OF CARE
Problem: Prexisting or High Potential for Compromised Skin Integrity  Goal: Skin integrity is maintained or improved  Description: INTERVENTIONS:  - Identify patients at risk for skin breakdown  - Assess and monitor skin integrity  - Assess and monitor nutrition and hydration status  - Monitor labs   - Assess for incontinence   - Turn and reposition patient  - Assist with mobility/ambulation  - Relieve pressure over bony prominences  - Avoid friction and shearing  - Provide appropriate hygiene as needed including keeping skin clean and dry  - Evaluate need for skin moisturizer/barrier cream  - Collaborate with interdisciplinary team   - Patient/family teaching  - Consider wound care consult   Outcome: Progressing     Problem: Nutrition/Hydration-ADULT  Goal: Nutrient/Hydration intake appropriate for improving, restoring or maintaining nutritional needs  Description: Monitor and assess patient's nutrition/hydration status for malnutrition. Collaborate with interdisciplinary team and initiate plan and interventions as ordered.  Monitor patient's weight and dietary intake as ordered or per policy. Utilize nutrition screening tool and intervene as necessary. Determine patient's food preferences and provide high-protein, high-caloric foods as appropriate.     INTERVENTIONS:  - Monitor oral intake, urinary output, labs, and treatment plans  - Assess nutrition and hydration status and recommend course of action  - Evaluate amount of meals eaten  - Assist patient with eating if necessary   - Allow adequate time for meals  - Recommend/ encourage appropriate diets, oral nutritional supplements, and vitamin/mineral supplements  - Order, calculate, and assess calorie counts as needed  - Recommend, monitor, and adjust tube feedings and TPN/PPN based on assessed needs  - Assess need for intravenous fluids  - Provide specific nutrition/hydration education as appropriate  - Include patient/family/caregiver in decisions related to  nutrition  Outcome: Progressing     Problem: PAIN - ADULT  Goal: Verbalizes/displays adequate comfort level or baseline comfort level  Description: Interventions:  - Encourage patient to monitor pain and request assistance  - Assess pain using appropriate pain scale  - Administer analgesics based on type and severity of pain and evaluate response  - Implement non-pharmacological measures as appropriate and evaluate response  - Consider cultural and social influences on pain and pain management  - Notify physician/advanced practitioner if interventions unsuccessful or patient reports new pain  Outcome: Progressing     Problem: INFECTION - ADULT  Goal: Absence or prevention of progression during hospitalization  Description: INTERVENTIONS:  - Assess and monitor for signs and symptoms of infection  - Monitor lab/diagnostic results  - Monitor all insertion sites, i.e. indwelling lines, tubes, and drains  - Monitor endotracheal if appropriate and nasal secretions for changes in amount and color  - [place] appropriate cooling/warming therapies per order  - Administer medications as ordered  - Instruct and encourage patient and family to use good hand hygiene technique  - Identify and instruct in appropriate isolation precautions for identified infection/condition  Outcome: Progressing  Goal: Absence of fever/infection during neutropenic period  Description: INTERVENTIONS:  - Monitor WBC    Outcome: Progressing     Problem: SAFETY ADULT  Goal: Patient will remain free of falls  Description: INTERVENTIONS:  - Educate patient/family on patient safety including physical limitations  - Instruct patient to call for assistance with activity   - Consult OT/PT to assist with strengthening/mobility   - Keep Call bell within reach  - Keep bed low and locked with side rails adjusted as appropriate  - Keep care items and personal belongings within reach  - Initiate and maintain comfort rounds  - Make Fall Risk Sign visible to  staff  - Offer Toileting every 2 Hours, in advance of need  - Initiate/Maintain bed/chair alarm  - Obtain necessary fall risk management equipment  - Apply yellow socks and bracelet for high fall risk patients  - Consider moving patient to room near nurses station  Outcome: Progressing  Goal: Maintain or return to baseline ADL function  Description: INTERVENTIONS:  -  Assess patient's ability to carry out ADLs; assess patient's baseline for ADL function and identify physical deficits which impact ability to perform ADLs (bathing, care of mouth/teeth, toileting, grooming, dressing, etc.)  - Assess/evaluate cause of self-care deficits   - Assess range of motion  - Assess patient's mobility; develop plan if impaired  - Assess patient's need for assistive devices and provide as appropriate  - Encourage maximum independence but intervene and supervise when necessary  - Involve family in performance of ADLs  - Assess for home care needs following discharge   - Consider OT consult to assist with ADL evaluation and planning for discharge  - Provide patient education as appropriate  Outcome: Progressing  Goal: Maintains/Returns to pre admission functional level  Description: INTERVENTIONS:  - Perform AM-PAC 6 Click Basic Mobility/ Daily Activity assessment daily.  - Set and communicate daily mobility goal to care team and patient/family/caregiver.   - Collaborate with rehabilitation services on mobility goals if consulted  - Perform Range of Motion 3 times a day.  - Reposition patient every 2 hours.  - Dangle patient 3 times a day  - Stand patient 3 times a day  - Ambulate patient 3 times a day  - Out of bed to chair 3 times a day   - Out of bed for meals 3 times a day  - Out of bed for toileting  - Record patient progress and toleration of activity level   Outcome: Progressing     Problem: DISCHARGE PLANNING  Goal: Discharge to home or other facility with appropriate resources  Description: INTERVENTIONS:  - Identify  barriers to discharge w/patient and caregiver  - Arrange for needed discharge resources and transportation as appropriate  - Identify discharge learning needs (meds, wound care, etc.)  - Arrange for interpretive services to assist at discharge as needed  - Refer to Case Management Department for coordinating discharge planning if the patient needs post-hospital services based on physician/advanced practitioner order or complex needs related to functional status, cognitive ability, or social support system  Outcome: Progressing     Problem: Knowledge Deficit  Goal: Patient/family/caregiver demonstrates understanding of disease process, treatment plan, medications, and discharge instructions  Description: Complete learning assessment and assess knowledge base.  Interventions:  - Provide teaching at level of understanding  - Provide teaching via preferred learning methods  Outcome: Progressing     Problem: GASTROINTESTINAL - ADULT  Goal: Minimal or absence of nausea and/or vomiting  Description: INTERVENTIONS:  - Administer IV fluids if ordered to ensure adequate hydration  - Maintain NPO status until nausea and vomiting are resolved  - Nasogastric tube if ordered  - Administer ordered antiemetic medications as needed  - Provide nonpharmacologic comfort measures as appropriate  - Advance diet as tolerated, if ordered  - Consider nutrition services referral to assist patient with adequate nutrition and appropriate food choices  Outcome: Progressing  Goal: Maintains or returns to baseline bowel function  Description: INTERVENTIONS:  - Assess bowel function  - Encourage oral fluids to ensure adequate hydration  - Administer IV fluids if ordered to ensure adequate hydration  - Administer ordered medications as needed  - Encourage mobilization and activity  - Consider nutritional services referral to assist patient with adequate nutrition and appropriate food choices  Outcome: Progressing

## 2025-01-01 NOTE — ASSESSMENT & PLAN NOTE
Had to be straight cathed x 1  Started on Flomax and Finasteride with improvement in symptoms  Has been able to urinate on his own  Urinary retention protocol  Outpatient followup with Urology

## 2025-01-01 NOTE — ASSESSMENT & PLAN NOTE
History of prostate cancer follows with oncology receiving chemotherapy (last on 12/12)  Follows with Dr. Hernandez  Current regimen is Cabazitaxel, Neulasta, prednisone, Lupron, denosumab  Continue to follow in outpatient setting

## 2025-01-01 NOTE — ASSESSMENT & PLAN NOTE
Presents with generalized weakness, failure to thrive, poor appetite, episodes of loose stools.  Denies fever, chills, sweats, constitutional symptoms  Likely multifactorial - chemotherapy, and V/D, poor oral intake  Supportive cares  PT recommended level II - Plan for transfer from [facility] to [facility] for vascular surgery procedure on 1/2/25. Vascular surgery Dr. Harper agrees with plan.  Accepting [facility] hospitalist is Dr. Partida

## 2025-01-02 ENCOUNTER — HOSPITAL ENCOUNTER (OUTPATIENT)
Dept: INFUSION CENTER | Facility: CLINIC | Age: 80
Discharge: HOME | End: 2025-01-02

## 2025-01-02 LAB
ANION GAP SERPL CALCULATED.3IONS-SCNC: 9 MMOL/L (ref 4–13)
BASOPHILS # BLD AUTO: 0.06 THOUSANDS/ΜL (ref 0–0.1)
BASOPHILS NFR BLD AUTO: 1 % (ref 0–1)
BUN SERPL-MCNC: 13 MG/DL (ref 5–25)
CALCIUM SERPL-MCNC: 8.2 MG/DL (ref 8.4–10.2)
CHLORIDE SERPL-SCNC: 109 MMOL/L (ref 96–108)
CO2 SERPL-SCNC: 21 MMOL/L (ref 21–32)
CREAT SERPL-MCNC: 0.71 MG/DL (ref 0.6–1.3)
EOSINOPHIL # BLD AUTO: 0.1 THOUSAND/ΜL (ref 0–0.61)
EOSINOPHIL NFR BLD AUTO: 1 % (ref 0–6)
ERYTHROCYTE [DISTWIDTH] IN BLOOD BY AUTOMATED COUNT: 14.7 % (ref 11.6–15.1)
GFR SERPL CREATININE-BSD FRML MDRD: 89 ML/MIN/1.73SQ M
GLUCOSE SERPL-MCNC: 88 MG/DL (ref 65–140)
GLUCOSE SERPL-MCNC: 91 MG/DL (ref 65–140)
HCT VFR BLD AUTO: 27.8 % (ref 36.5–49.3)
HGB BLD-MCNC: 8.6 G/DL (ref 12–17)
IMM GRANULOCYTES # BLD AUTO: 0.11 THOUSAND/UL (ref 0–0.2)
IMM GRANULOCYTES NFR BLD AUTO: 1 % (ref 0–2)
LYMPHOCYTES # BLD AUTO: 0.87 THOUSANDS/ΜL (ref 0.6–4.47)
LYMPHOCYTES NFR BLD AUTO: 7 % (ref 14–44)
MAGNESIUM SERPL-MCNC: 1.8 MG/DL (ref 1.9–2.7)
MCH RBC QN AUTO: 29.6 PG (ref 26.8–34.3)
MCHC RBC AUTO-ENTMCNC: 30.9 G/DL (ref 31.4–37.4)
MCV RBC AUTO: 96 FL (ref 82–98)
MONOCYTES # BLD AUTO: 1 THOUSAND/ΜL (ref 0.17–1.22)
MONOCYTES NFR BLD AUTO: 8 % (ref 4–12)
NEUTROPHILS # BLD AUTO: 10.69 THOUSANDS/ΜL (ref 1.85–7.62)
NEUTS SEG NFR BLD AUTO: 82 % (ref 43–75)
NRBC BLD AUTO-RTO: 0 /100 WBCS
PHOSPHATE SERPL-MCNC: 3 MG/DL (ref 2.3–4.1)
PLATELET # BLD AUTO: 419 THOUSANDS/UL (ref 149–390)
PMV BLD AUTO: 10.2 FL (ref 8.9–12.7)
POTASSIUM SERPL-SCNC: 4 MMOL/L (ref 3.5–5.3)
RBC # BLD AUTO: 2.91 MILLION/UL (ref 3.88–5.62)
SODIUM SERPL-SCNC: 139 MMOL/L (ref 135–147)
WBC # BLD AUTO: 12.83 THOUSAND/UL (ref 4.31–10.16)

## 2025-01-02 PROCEDURE — 82948 REAGENT STRIP/BLOOD GLUCOSE: CPT

## 2025-01-02 PROCEDURE — 99223 1ST HOSP IP/OBS HIGH 75: CPT | Performed by: SURGERY

## 2025-01-02 PROCEDURE — 83735 ASSAY OF MAGNESIUM: CPT

## 2025-01-02 PROCEDURE — 84100 ASSAY OF PHOSPHORUS: CPT

## 2025-01-02 PROCEDURE — 85025 COMPLETE CBC W/AUTO DIFF WBC: CPT

## 2025-01-02 PROCEDURE — 80048 BASIC METABOLIC PNL TOTAL CA: CPT

## 2025-01-02 PROCEDURE — 99233 SBSQ HOSP IP/OBS HIGH 50: CPT

## 2025-01-02 RX ORDER — HYDROMORPHONE HCL IN WATER/PF 6 MG/30 ML
0.2 PATIENT CONTROLLED ANALGESIA SYRINGE INTRAVENOUS EVERY 4 HOURS PRN
Status: DISCONTINUED | OUTPATIENT
Start: 2025-01-02 | End: 2025-01-04

## 2025-01-02 RX ORDER — OXYCODONE HYDROCHLORIDE 5 MG/1
5 TABLET ORAL EVERY 4 HOURS PRN
Status: DISCONTINUED | OUTPATIENT
Start: 2025-01-02 | End: 2025-01-04

## 2025-01-02 RX ADMIN — POTASSIUM CHLORIDE 20 MEQ: 1500 TABLET, EXTENDED RELEASE ORAL at 08:21

## 2025-01-02 RX ADMIN — OXYCODONE HYDROCHLORIDE 5 MG: 5 TABLET ORAL at 01:10

## 2025-01-02 RX ADMIN — GABAPENTIN 600 MG: 300 CAPSULE ORAL at 08:20

## 2025-01-02 RX ADMIN — DICLOFENAC SODIUM 2 G: 10 GEL TOPICAL at 21:50

## 2025-01-02 RX ADMIN — ENOXAPARIN SODIUM 40 MG: 40 INJECTION SUBCUTANEOUS at 08:21

## 2025-01-02 RX ADMIN — METHOCARBAMOL 500 MG: 500 TABLET ORAL at 17:17

## 2025-01-02 RX ADMIN — OXYCODONE HYDROCHLORIDE 5 MG: 5 TABLET ORAL at 21:53

## 2025-01-02 RX ADMIN — CLOPIDOGREL BISULFATE 75 MG: 75 TABLET, FILM COATED ORAL at 08:19

## 2025-01-02 RX ADMIN — GABAPENTIN 600 MG: 300 CAPSULE ORAL at 17:15

## 2025-01-02 RX ADMIN — TAMSULOSIN HYDROCHLORIDE 0.4 MG: 0.4 CAPSULE ORAL at 17:15

## 2025-01-02 RX ADMIN — METOPROLOL SUCCINATE 25 MG: 25 TABLET, EXTENDED RELEASE ORAL at 08:19

## 2025-01-02 RX ADMIN — DICLOFENAC SODIUM 2 G: 10 GEL TOPICAL at 08:21

## 2025-01-02 RX ADMIN — PENTOXIFYLLINE 400 MG: 400 TABLET, EXTENDED RELEASE ORAL at 07:33

## 2025-01-02 RX ADMIN — PREDNISONE 5 MG: 5 TABLET ORAL at 17:15

## 2025-01-02 RX ADMIN — PENTOXIFYLLINE 400 MG: 400 TABLET, EXTENDED RELEASE ORAL at 17:15

## 2025-01-02 RX ADMIN — ALLOPURINOL 100 MG: 100 TABLET ORAL at 08:19

## 2025-01-02 RX ADMIN — DICLOFENAC SODIUM 2 G: 10 GEL TOPICAL at 11:20

## 2025-01-02 RX ADMIN — FERROUS SULFATE TAB 325 MG (65 MG ELEMENTAL FE) 325 MG: 325 (65 FE) TAB at 08:19

## 2025-01-02 RX ADMIN — ATORVASTATIN CALCIUM 80 MG: 80 TABLET, FILM COATED ORAL at 17:15

## 2025-01-02 RX ADMIN — ASPIRIN 81 MG CHEWABLE TABLET 81 MG: 81 TABLET CHEWABLE at 08:20

## 2025-01-02 RX ADMIN — OXYCODONE HYDROCHLORIDE 5 MG: 5 TABLET ORAL at 06:40

## 2025-01-02 RX ADMIN — OXYCODONE HYDROCHLORIDE 5 MG: 5 TABLET ORAL at 17:17

## 2025-01-02 RX ADMIN — EZETIMIBE 10 MG: 10 TABLET ORAL at 08:19

## 2025-01-02 RX ADMIN — FINASTERIDE 5 MG: 5 TABLET, FILM COATED ORAL at 08:20

## 2025-01-02 RX ADMIN — METHOCARBAMOL 500 MG: 500 TABLET ORAL at 21:49

## 2025-01-02 RX ADMIN — METHOCARBAMOL 500 MG: 500 TABLET ORAL at 08:20

## 2025-01-02 RX ADMIN — HYDROMORPHONE HYDROCHLORIDE 0.2 MG: 0.2 INJECTION, SOLUTION INTRAMUSCULAR; INTRAVENOUS; SUBCUTANEOUS at 18:28

## 2025-01-02 RX ADMIN — PREDNISONE 5 MG: 5 TABLET ORAL at 07:33

## 2025-01-02 RX ADMIN — PENTOXIFYLLINE 400 MG: 400 TABLET, EXTENDED RELEASE ORAL at 11:19

## 2025-01-02 RX ADMIN — FAMOTIDINE 20 MG: 20 TABLET, FILM COATED ORAL at 08:19

## 2025-01-02 NOTE — PLAN OF CARE
Problem: PAIN - ADULT  Goal: Verbalizes/displays adequate comfort level or baseline comfort level  Description: Interventions:  - Encourage patient to monitor pain and request assistance  - Assess pain using appropriate pain scale  - Administer analgesics based on type and severity of pain and evaluate response  - Implement non-pharmacological measures as appropriate and evaluate response  - Consider cultural and social influences on pain and pain management  - Notify physician/advanced practitioner if interventions unsuccessful or patient reports new pain  Outcome: Progressing     Problem: INFECTION - ADULT  Goal: Absence or prevention of progression during hospitalization  Description: INTERVENTIONS:  - Assess and monitor for signs and symptoms of infection  - Monitor lab/diagnostic results  - Monitor all insertion sites, i.e. indwelling lines, tubes, and drains  - Monitor endotracheal if appropriate and nasal secretions for changes in amount and color  - [place] appropriate cooling/warming therapies per order  - Administer medications as ordered  - Instruct and encourage patient and family to use good hand hygiene technique  - Identify and instruct in appropriate isolation precautions for identified infection/condition  Outcome: Progressing  Goal: Absence of fever/infection during neutropenic period  Description: INTERVENTIONS:  - Monitor WBC    Outcome: Progressing     Problem: SAFETY ADULT  Goal: Patient will remain free of falls  Description: INTERVENTIONS:  - Educate patient/family on patient safety including physical limitations  - Instruct patient to call for assistance with activity   - Consult OT/PT to assist with strengthening/mobility   - Keep Call bell within reach  - Keep bed low and locked with side rails adjusted as appropriate  - Keep care items and personal belongings within reach  - Initiate and maintain comfort rounds  - Make Fall Risk Sign visible to staff  - Offer Toileting every  Hours,  in advance of need  - Initiate/Maintain alarm  - Obtain necessary fall risk management equipment:   - Apply yellow socks and bracelet for high fall risk patients  - Consider moving patient to room near nurses station  Outcome: Progressing  Goal: Maintain or return to baseline ADL function  Description: INTERVENTIONS:  -  Assess patient's ability to carry out ADLs; assess patient's baseline for ADL function and identify physical deficits which impact ability to perform ADLs (bathing, care of mouth/teeth, toileting, grooming, dressing, etc.)  - Assess/evaluate cause of self-care deficits   - Assess range of motion  - Assess patient's mobility; develop plan if impaired  - Assess patient's need for assistive devices and provide as appropriate  - Encourage maximum independence but intervene and supervise when necessary  - Involve family in performance of ADLs  - Assess for home care needs following discharge   - Consider OT consult to assist with ADL evaluation and planning for discharge  - Provide patient education as appropriate  Outcome: Progressing  Goal: Maintains/Returns to pre admission functional level  Description: INTERVENTIONS:  - Perform AM-PAC 6 Click Basic Mobility/ Daily Activity assessment daily.  - Set and communicate daily mobility goal to care team and patient/family/caregiver.   - Collaborate with rehabilitation services on mobility goals if consulted  - Perform Range of Motion  times a day.  - Reposition patient every  hours.  - Dangle patient  times a day  - Stand patient  times a day  - Ambulate patient  times a day  - Out of bed to chair  times a day   - Out of bed for meals  times a day  - Out of bed for toileting  - Record patient progress and toleration of activity level   Outcome: Progressing     Problem: DISCHARGE PLANNING  Goal: Discharge to home or other facility with appropriate resources  Description: INTERVENTIONS:  - Identify barriers to discharge w/patient and caregiver  - Arrange for  needed discharge resources and transportation as appropriate  - Identify discharge learning needs (meds, wound care, etc.)  - Arrange for interpretive services to assist at discharge as needed  - Refer to Case Management Department for coordinating discharge planning if the patient needs post-hospital services based on physician/advanced practitioner order or complex needs related to functional status, cognitive ability, or social support system  Outcome: Progressing     Problem: Knowledge Deficit  Goal: Patient/family/caregiver demonstrates understanding of disease process, treatment plan, medications, and discharge instructions  Description: Complete learning assessment and assess knowledge base.  Interventions:  - Provide teaching at level of understanding  - Provide teaching via preferred learning methods  Outcome: Progressing     Problem: Prexisting or High Potential for Compromised Skin Integrity  Goal: Skin integrity is maintained or improved  Description: INTERVENTIONS:  - Identify patients at risk for skin breakdown  - Assess and monitor skin integrity  - Assess and monitor nutrition and hydration status  - Monitor labs   - Assess for incontinence   - Turn and reposition patient  - Assist with mobility/ambulation  - Relieve pressure over bony prominences  - Avoid friction and shearing  - Provide appropriate hygiene as needed including keeping skin clean and dry  - Evaluate need for skin moisturizer/barrier cream  - Collaborate with interdisciplinary team   - Patient/family teaching  - Consider wound care consult   Outcome: Progressing

## 2025-01-02 NOTE — PHYSICAL THERAPY NOTE
Physical Therapy Cancellation Note               01/02/25 0821   PT Last Visit   PT Visit Date 01/02/25   Note Type   Note type Evaluation   Cancel Reasons Other   Additional Comments PT Orders received and chart reviewed. Awaiting sx plan between vascular and podiatry. Will await further consult. PT will continue to follow as able and appropriate.     Kyra Ross PT, DPT

## 2025-01-02 NOTE — ASSESSMENT & PLAN NOTE
Baseline- close to wheelchair bounded- minimal walk.  Fall precautions  PT in [facility] recommended level II rehab.  Patient is amenable.   Will find placement after procedure

## 2025-01-02 NOTE — CASE MANAGEMENT
Case Management Assessment    Patient name Royce BAER Free  [facility] MRN [id number]  : 1945 Date 2025       Current Admission Date: 2025  Current Admission Diagnosis:PAD (peripheral artery disease) (HCC)   Patient Active Problem List    Diagnosis Date Noted Date Diagnosed    Hyperuricemia 2024     Iron deficiency anemia 2024     Hyponatremia 2024     Diarrhea 2024     Right ankle pain 2024     Diabetic ulcer of toe of right foot associated with type 2 diabetes mellitus, with necrosis of bone (HCC) 2024     Vitamin B12 deficiency 2024     Mixed hyperlipidemia 2024     Exudative age-related macular degeneration, left eye, with active choroidal neovascularization ([facility]) 2024     Facial lesion 2024     Dry gangrene ([facility]) 2024     Ischemic pain of foot at rest 10/29/2024     Ulcer of right foot with fat layer exposed (HCC) 10/29/2024     Ulcer of left foot, limited to breakdown of skin (HCC) 10/29/2024     Osteomyelitis ([facility]) 10/22/2024     Prevention of chemotherapy-induced neutropenia 2024     Frail elder // vulnerable adult 2024     Elevated liver enzymes 2024     Proctitis 2024     Class 1 obesity due to excess calories with body mass index (BMI) of 33.0 to 33.9 in adult 2023     Electrolyte imbalance 2023     Moderate vascular dementia (HCC) 2023     Ambulatory dysfunction 2022     Peripheral neuropathy 2022     Financial problems 2022     Cancer-related pain 05/10/2022     Palliative care patient 05/10/2022     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) 2022     Embolism and 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 (HCC) 2022     Depression, recurrent (HCC) 2022     Port-A-Cath in place 10/05/2021     PAD (peripheral artery disease) (HCC) 2021     Urinary retention 2021     CAD (coronary artery disease) 07/10/2021      Ischemic leg pain 07/07/2021     Prostate cancer (HCC) 07/07/2021     Ischemic cardiomyopathy 07/01/2021     Chronic combined systolic and diastolic CHF (congestive heart failure) (HCC) 06/22/2021     S/P AVR 06/22/2021     Anemia 06/22/2021       LOS (days): 1  Geometric Mean LOS (GMLOS) (days):   Days to GMLOS:     OBJECTIVE:  Risk of Unplanned Readmission Score: 39.88     Current admission status: Inpatient    Preferred Pharmacy:   [facility], [facility] [place] - [address] [facility]  [address] [facility] [address]  Phone: [phone] Fax: [phone]    Primary Care Provider: Anne Chandra MD    Primary Insurance: [facility]UM [facility]  Secondary Insurance: [employer]ravel REP    ASSESSMENT:  Active Health Care Proxies       Rodrigue Rene Health Care Representative - Son   Primary Phone: [phone] (Mobile)     Advance Directives  Does patient have a Health Care POA?: No  Does patient have Advance Directives?: No  Primary Contact: pt's son Rodrigue Rene / phone # [phone]    Patient Information  Admitted from:: Facility (pt was transferred to [facility] on 1/1/25 from [facility] where pt was originally admitted on 12/26/24)  Mental Status: Alert  Assessment information provided by:: Patient  Primary Caregiver: Self  Support Systems: Children  County of Residence: [place]  What city do you live in[place]  Home entry access options. Select all that apply.: Stairs  Number of steps to enter home.: 1  Do the steps have railings?: No  Type of Current Residence: 2 [place] home  Upon entering residence, is there a bedroom on the main floor (no further steps)?: No  A bedroom is located on the following floor levels of residence (select all that apply):: 2nd Floor  Upon entering residence, is there a bathroom on the main floor (no further steps)?: Yes  Number of steps to 2nd floor from main floor: One Flight  Living Arrangements: Lives Alone  Is patient a ?: Yes ([employer])  Is patient active with [facility] ([place] [facility]: [facility] ([facility]  Is patient service connected?: No    Activities of Daily Living Prior to Admission  Functional Status: Independent  Completes ADLs independently?: Yes  Ambulates independently?: Yes  Does patient use assisted devices?: Yes  Assisted Devices (DME) used: Stair Chair/[place]  Does patient currently own DME?: Yes  What DME does the patient currently own?: Electric Wheelchair  Does the patient have a history of Short-Term Rehab?: Yes (pt has Hx of rehab placement at [facility] in 2023)  Does patient have a history of HHC?: Yes (pt has Hx of utilizing services w/ [facility])  Does patient currently have HHC?: No    Patient Information Continued  Income Source: Pension/[employer]  Does patient have prescription coverage?: Yes  Does patient receive dialysis treatments?: No  Does patient have a history of substance abuse?: No  Does patient have a history of Mental Health Diagnosis?: No    Means of Transportation  Means of Transport to Appts:: Drives Self    Social Determinants of Health (SDOH)      Flowsheet Row Most Recent Value   Housing Stability    In the last 12 months, was there a time when you were not able to pay the mortgage or rent on time? N   In the past 12 months, how many times have you moved where you were living? 0   At any time in the past 12 months, were you homeless or living in a shelter (including now)? N   Transportation Needs    In the past 12 months, has lack of transportation kept you from medical appointments or from getting medications? no   In the past 12 months, has lack of transportation kept you from meetings, work, or from getting things needed for daily living? No   Food Insecurity    Within the past 12 months, you worried that your food would run out before you got the money to buy more. Never true   Within the past 12 months, the food you bought just didn't last and you didn't have money  to get more. Never true   Utilities    In the past 12 months has the electric, gas, oil, or water company threatened to shut off services in your home? No     Additional Comments: CM reviewed d/c planning process including the following: identifying help at home, patient preference for d/c planning needs, availability of treatment team to discuss questions or concerns patient and/or family may have regarding understanding medications and recognizing signs and symptoms once discharged. CM also encouraged patient to follow up with all recommended appointments after discharge. Patient advised of importance for patient and family to participate in managing patient’s medical well being. Patient/caregiver received discharge checklist. Content reviewed. Patient/caregiver encouraged to participate in discharge plan of care prior to discharge home.

## 2025-01-02 NOTE — OCCUPATIONAL THERAPY NOTE
Occupational Therapy cx        Patient Name: Royce BAER Free  Today's Date: 1/2/2025 01/02/25 0820   OT Last Visit   OT Visit Date 01/02/25   Note Type   Note type Evaluation   Cancel Reasons Other   Additional Comments Awaiting sx plan btwn vasc and podiatry.         Odalis Hoffmann, BELKIS, OTR/L

## 2025-01-02 NOTE — UTILIZATION REVIEW
Initial Clinical Review    The patient was transferred to [facility] on 1/1/25 from [facility], where /care began on 12/26/24.  7 midnights have already been surpassed with active ongoing care.       Admission: Date/Time/Statement:   Admission Orders (From admission, onward)       Ordered        01/01/25 1042  INPATIENT ADMISSION  Once                          Orders Placed This Encounter   Procedures    INPATIENT ADMISSION     Standing Status:   Standing     Number of Occurrences:   1     Level of Care:   Med Surg [16]     Estimated length of stay:   More than 2 Midnights     Certification:   I certify that inpatient services are medically necessary for this patient for a duration of greater than two midnights. See H&P and MD Progress Notes for additional information about the patient's course of treatment.        Initial Presentation: 79 y.o. male who presented as a transfer by EMS to [facility] ED. Admitted as Inpatient for evaluation and treatment of R foot gangrene and poor wound healing on LLE. Presented w/ need for limb flow procedure w/ vascular surgery. WBC 13.78. PLAN: continue current meds, SSI w/ BG checks ACHS, supportive care. Podiatry, Vascular Surgery consulted.    Podiatry: L lateral midfoot ulceration, R great toe dry gangrene. No concern for acute infection. Local wound care.     Vascular Surgery: Pt for OR tomorrow for RLE angio and limb flow procedure, possible LLE angio. NPO after midnight, continue ASA, statin, Plavix.  CTA showed R SFA stent occlusion and possible L SFA severe stenosis vs occlusion. Cancel planned RLE angio and limb flow procedure. Further surgical planning needed.       Scheduled Medications:  allopurinol, 100 mg, Oral, Daily  aspirin, 81 mg, Oral, Daily  atorvastatin, 80 mg, Oral, Daily With Dinner  clopidogrel, 75 mg, Oral, Daily  Diclofenac Sodium, 2 g, Topical, 4x Daily  [Held by provider]  Empagliflozin, 10 mg, Oral, QAM  enoxaparin, 40 mg, Subcutaneous, Daily  ezetimibe, 10 mg, Oral, Daily  famotidine, 20 mg, Oral, Daily  ferrous sulfate, 325 mg, Oral, Daily  finasteride, 5 mg, Oral, Daily  [Held by provider] furosemide, 40 mg, Oral, Daily  gabapentin, 600 mg, Oral, BID  [Held by provider] losartan, 12.5 mg, Oral, Daily  methocarbamol, 500 mg, Oral, TID  metoprolol succinate, 25 mg, Oral, Daily  pentoxifylline, 400 mg, Oral, TID With Meals  potassium chloride, 20 mEq, Oral, Daily  predniSONE, 5 mg, Oral, BID With Meals  tamsulosin, 0.4 mg, Oral, Daily With Dinner    Continuous IV Infusions: none    PRN Meds:  acetaminophen, 650 mg, Oral, Q6H PRN; 1/1 x1  bismuth subsalicylate, 524 mg, Oral, Q6H PRN  HYDROmorphone, 0.2 mg, Intravenous, Q4H PRN  loperamide, 2 mg, Oral, 4x Daily PRN  menthol-methyl salicylate, , Apply externally, 4x Daily PRN  naloxone, 0.04 mg, Intravenous, Q1MIN PRN  ondansetron, 4 mg, Intravenous, Q6H PRN  oxyCODONE, 2.5 mg, Oral, Q4H PRN  oxyCODONE, 5 mg, Oral, Q4H PRN; 1/1 x1      ED Triage Vitals   Temperature Pulse Respirations Blood Pressure SpO2 Pain Score   01/01/25 0847 01/01/25 0847 01/01/25 1523 01/01/25 0847 01/01/25 0847 01/01/25 0950   98.8 °F (37.1 °C) 104 20 (!) 93/46 90 % 8     Weight (last 2 days)       None            Vital Signs (last 3 days)       Date/Time Temp Pulse Resp BP MAP (mmHg) SpO2 O2 Device Patient Position - Orthostatic VS Doretha Coma Scale Score Pain    01/02/25 07:34:09 -- 131 -- 136/88 104 95 % -- -- -- --    01/02/25 07:29:51 98.4 °F (36.9 °C) 128 18 136/88 104 94 % None (Room air) Lying -- --    01/02/25 0640 -- -- -- -- -- -- -- -- -- 8    01/02/25 01:59:53 98.3 °F (36.8 °C) 92 18 131/82 98 93 % -- -- -- --    01/02/25 0110 -- -- -- -- -- -- -- -- -- 9    01/01/25 2100 -- -- -- -- -- -- None (Room air) -- -- --    01/01/25 1722 -- -- -- -- -- -- -- -- -- 10 - Worst Possible Pain    01/01/25 15:23:28 99.1 °F (37.3 °C) 91 20 130/82 98 93 % -- --  -- --    01/01/25 0950 -- -- -- -- -- -- -- -- -- 8    01/01/25 0900 -- -- -- -- -- -- -- -- 15 --    01/01/25 08:48:59 98.8 °F (37.1 °C) 104 -- 135/82 100 92 % -- -- -- --    01/01/25 08:47:38 98.8 °F (37.1 °C) 104 -- 93/46 62 90 % -- -- -- --              Pertinent Labs/Diagnostic Test Results:    Results from last 7 days   Lab Units 01/02/25  0513 01/01/25 0515 12/31/24 0314 12/29/24 0427 12/28/24  0528 12/27/24  0608   WBC Thousand/uL 12.83* 13.78* 14.77* 12.71* 10.24* 11.90*   HEMOGLOBIN g/dL 8.6* 8.9* 8.6* 9.0* 8.9* 9.3*   HEMATOCRIT % 27.8* 27.7* 28.3* 28.0* 27.6* 29.2*   PLATELETS Thousands/uL 419* 400* 368 341 313 281   TOTAL NEUT ABS Thousands/µL 10.69*  --   --   --   --  9.80*         Results from last 7 days   Lab Units 01/02/25  0513 01/01/25  0515 12/31/24 0314 12/30/24 0300 12/29/24 0427 12/28/24  0528 12/27/24  0608   SODIUM mmol/L 139 138 138 140 142 141 137   POTASSIUM mmol/L 4.0 3.7 4.1 3.7 3.3* 3.3* 3.3*   CHLORIDE mmol/L 109* 109* 111* 108 109* 111* 107   CO2 mmol/L 21 22 18* 22 21 20* 20*   ANION GAP mmol/L 9 7 9 10 12 10 10   BUN mg/dL 13 13 13 12 11 10 13   CREATININE mg/dL 0.71 0.71 0.73 0.68 0.69 0.67 0.78   EGFR ml/min/1.73sq m 89 89 88 90 90 91 85   CALCIUM mg/dL 8.2* 8.1* 7.9* 7.9* 7.6* 6.9* 7.0*   CALCIUM, IONIZED mmol/L  --  1.11* 1.06*  --  1.00* 0.97*  --    MAGNESIUM mg/dL 1.8*  --   --   --  1.9 1.9 1.9   PHOSPHORUS mg/dL 3.0  --   --   --  2.3 2.2* 1.9*     Results from last 7 days   Lab Units 12/27/24  0608   AST U/L 15   ALT U/L 11   ALK PHOS U/L 70   TOTAL PROTEIN g/dL 5.8*   ALBUMIN g/dL 3.4*   TOTAL BILIRUBIN mg/dL 0.54     Results from last 7 days   Lab Units 01/02/25  1033   POC GLUCOSE mg/dl 91     Results from last 7 days   Lab Units 01/02/25  0513 01/01/25  0515 12/31/24  0314 12/30/24  0300 12/29/24  0427 12/28/24  0528 12/27/24  0608   GLUCOSE RANDOM mg/dL 88 87 106 97 99 102 93      Results from last 7 days   Lab Units 12/26/24  1620 12/26/24  1429   HS TNI 2HR  ng/L  --  107*   HSTNI D2 ng/L  --  -11   HS TNI 4HR ng/L 105*  --    HSTNI D4 ng/L -13  --       Results from last 7 days   Lab Units 12/28/24  0528   FERRITIN ng/mL 1,012*   IRON SATURATION % 10*   IRON ug/dL 21*   TIBC ug/dL 214.2*     Results from last 7 days   Lab Units 12/28/24  0528   TRANSFERRIN mg/dL 153*      Results from last 7 days   Lab Units 12/26/24  1620   CRP mg/L 179.1*      Results from last 7 days   Lab Units 12/27/24  1326   C DIFF TOXIN B BY PCR  Negative     Results from last 7 days   Lab Units 12/27/24  1326   SALMONELLA SP PCR  Negative   SHIGELLA SP/ENTEROINVASIVE E. COLI (EIEC)  Negative   CAMPYLOBACTER SP (JEJUNI AND COLI)  Negative   SHIGA TOXIN 1/SHIGA TOXIN 2  Negative        Past Medical History:   Diagnosis Date    Cancer ([facility]) 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    Coronary artery disease 2001    S/p stenting to circumflex and RCA    HFrEF (heart failure with reduced ejection fraction) ([facility]) 06/14/2021    High cholesterol     Pacemaker     Prostate cancer ([facility])      Present on Admission:   Diarrhea   Urinary retention   Right ankle pain   Chronic combined systolic and diastolic CHF (congestive heart failure) ([facility])   CAD (coronary artery disease)   PAD (peripheral artery disease) ([facility])   Ambulatory dysfunction   Hyponatremia   Ulcer of left foot, limited to breakdown of skin ([facility])   Iron deficiency anemia   Prostate cancer ([facility])      Admitting Diagnosis: Generalized weakness [R53.1]  Age/Sex: 79 y.o. male    Network Utilization Review Department  ATTENTION: Please call with any questions or concerns to [phone] and carefully listen to the prompts so that you are directed to the right person. All voicemails are confidential.   For Discharge needs, contact Care Management DC Support Team at [phone] opt. [phone]  Send all requests for admission clinical reviews, approved or denied determinations and any other requests to dedicated fax number below belonging to the campus where the patient is  receiving treatment. List of dedicated fax numbers for the Facilities:  FACILITY NAME UR FAX NUMBER   ADMISSION DENIALS (Administrative/Medical Necessity) [phone]   DISCHARGE SUPPORT TEAM (NETWORK) [phone]   PARENT CHILD HEALTH (Maternity/NICU/Pediatrics) [phone]   [facility] [phone]   [facility] [phone]   [facility] [phone]   [facility] [phone]   [facility] [phone]   [facility] [phone]   [facility] [phone]   [facility] [phone]   [facility] [phone]   [facility] [phone]   [facility] [phone]   [facility] [phone]

## 2025-01-02 NOTE — ASSESSMENT & PLAN NOTE
Lab Results   Component Value Date    HGB 8.6 (L) 01/02/2025    HGB 8.9 (L) 01/01/2025    HGB 8.6 (L) 12/31/2024    HGB 9.0 (L) 12/29/2024    HGB 8.9 (L) 12/28/2024    CONCFE 10 (L) 12/28/2024    IRON 21 (L) 12/28/2024    FERRITIN 1,012 (H) 12/28/2024    TIBC 214.2 (L) 12/28/2024     Continue iron supplementation

## 2025-01-02 NOTE — ASSESSMENT & PLAN NOTE
"Recent Labs     12/31/24  0314 01/01/25  0515 01/02/25  0513   SODIUM 138 138 139     No results found for: \"OSMOUA\", \"NAUR\", \"OSMOLALITSER\"    Resolved    "

## 2025-01-02 NOTE — UTILIZATION REVIEW
NOTIFICATION OF ADMISSION DISCHARGE   This is a Notification of Discharge from [facility]. Please be advised that this patient has been discharge from our facility. Below you will find the admission and discharge date and time including the patient’s disposition.   UTILIZATION REVIEW CONTACT:  Stephanie Wray  Utilization   Network Utilization Review Department  Phone: [phone] x carefully listen to the prompts. All voicemails are confidential.  Email: [email or web address]     ADMISSION INFORMATION  PRESENTATION DATE: 12/26/2024 10:55 AM  OBERVATION ADMISSION DATE: N/A  INPATIENT ADMISSION DATE: 12/26/24  3:06 PM   DISCHARGE DATE: 1/1/2025  8:40 AM   DISPOSITION:[facility] Utilization Review Department  ATTENTION: Please call with any questions or concerns to [phone] and carefully listen to the prompts so that you are directed to the right person. All voicemails are confidential.   For Discharge needs, contact Care Management DC Support Team at [phone] opt. [phone]  Send all requests for admission clinical reviews, approved or denied determinations and any other requests to dedicated fax number below belonging to the campus where the patient is receiving treatment. List of dedicated fax numbers for the Facilities:  FACILITY NAME UR FAX NUMBER   ADMISSION DENIALS (Administrative/Medical Necessity) [phone]   DISCHARGE SUPPORT TEAM ([facility]) [phone]   PARENT CHILD HEALTH (Maternity/NICU/Pediatrics) [phone]   [facility] [phone]   [facility] [phone]   [facility] [phone]   General acute hospital [phone]   [facility] [phone]   [facility] [phone]   [facility] [phone]   [facility]  [phone]   [facility] [phone]   [facility] [phone]   [facility] [phone]   [facility] [phone]

## 2025-01-02 NOTE — PROGRESS NOTES
Progress Note - Hospitalist   Name: Royce Rene 79 y.o. male I MRN: [id number]  [facility] I Date of Admission: 1/1/2025   Date of Service: 1/2/2025 I Hospital Day: 1     Assessment & Plan  PAD (peripheral artery disease) (HCC)    Right foot great toe gangrene with concern for underlying osteomyelitis  Severe peripheral vascular disease  Ulceration left foot midfoot lateral  History of extensive peripheral arterial disease  Podiatry and Vascular onboard  Continue aspirin, Plavix, atorvastatin, pentoxifylline  Transferred to [place] on 1/1/2025.  Discussed with podiatry and vascular.  Per vascular the CTA of the lower extremity shows very severe disease.  Not able to do limflow procedure.  Reached out to podiatry and they are also limited due to poor vasculature of the leg and are unable to do surgery.  Vascular said they will swing by after their operations today and discussed with the patient.  Ambulatory dysfunction  Baseline- close to wheelchair bounded- minimal walk.  Fall precautions  PT in [facility] recommended level II rehab.  Patient is amenable.   Will find placement after procedure  Chronic combined systolic and diastolic CHF (congestive heart failure) (HCC)  Wt Readings from Last 3 Encounters:   12/26/24 100 kg (220 lb 14.4 oz)   12/13/24 103 kg (227 lb)   12/12/24 103 kg (227 lb)   Home diuretic: PO Lasix 40mg QD will hold prior to procedure and resume  Continue metoprolol, losartan, Jardiance  Low-salt diet  Monitor I/O, daily weights        Prostate cancer (HCC)  History of prostate cancer follows with oncology receiving chemotherapy (last on 12/12)  Follows with Dr. Hernandez  Current regimen is Cabazitaxel, Neulasta, prednisone, Lupron, denosumab  Continue to follow in outpatient setting  Diarrhea  Patient with episodes of diarrhea since receiving chemotherapy last week.  He also reports some nausea and vomiting.  Poor PO intake  Likely chemotherapy associated.  Improving  C.  "difficile and stool enteric panel negative  Supportive cares  Replete electrolytes  Imodium as needed  CAD (coronary artery disease)  Continue aspirin, metoprolol, atorvastatin  Urinary retention  Had to be straight cathed x 1  Started on Flomax and Finasteride with improvement in symptoms  Has been able to urinate on his own  Urinary retention protocol  Outpatient followup with Urology  Right ankle pain  Lab Results   Component Value Date    URICACID 11.5 (H) 12/26/2024       Patient reports right ankle pain  No obvious erythema swelling  X-ray reveals some sclerosis of calcaneus possible stress injury  CRP 79.1, uric acid 11.5.  Possible gout  Podiatry following, appreciate recommendations   Wounds do not appear to be acutely infected.  Agree with prednisone for acute gouty arthropathy.  No surgical plans  Supportive cares  Started on allopurinol  Ulcer of left foot, limited to breakdown of skin (HCC)  Pic under media.  Low suspicion for active infection  Per podiatry, no surgical plans      Hyponatremia  Recent Labs     12/31/24  0314 01/01/25  0515 01/02/25  0513   SODIUM 138 138 139     No results found for: \"OSMOUA\", \"NAUR\", \"OSMOLALITSER\"    Resolved    Iron deficiency anemia  Lab Results   Component Value Date    HGB 8.6 (L) 01/02/2025    HGB 8.9 (L) 01/01/2025    HGB 8.6 (L) 12/31/2024    HGB 9.0 (L) 12/29/2024    HGB 8.9 (L) 12/28/2024    CONCFE 10 (L) 12/28/2024    IRON 21 (L) 12/28/2024    FERRITIN 1,012 (H) 12/28/2024    TIBC 214.2 (L) 12/28/2024     Continue iron supplementation      VTE Pharmacologic Prophylaxis: VTE Score: 6 High Risk (Score >/= 5) - Pharmacological DVT Prophylaxis Ordered: enoxaparin (Lovenox). Sequential Compression Devices Ordered.    Mobility:   Basic Mobility Inpatient Raw Score: 8  JH-HLM Goal: 3: Sit at edge of bed  JH-HLM Achieved: 2: Bed activities/Dependent transfer  JH-HLM Goal achieved. Continue to encourage appropriate mobility.    Patient Centered Rounds: I performed " bedside rounds with nursing staff today.   Discussions with Specialists or Other Care Team Provider: Podiatry and vascular    Education and Discussions with Family / Patient: Updated  (son) via phone.    Current Length of Stay: 1 day(s)  Current Patient Status: Inpatient   Certification Statement: The patient, who was admitted as an inpatient, is being discharged sooner than originally anticipated due to ffoot ulcer  Discharge Plan: Anticipate discharge in 24-48 hrs to rehab facility.    Code Status: Level 1 - Full Code    Subjective   Patient does not report and headaches, shortness of breath, chest pain, abdominal pain, nausea vomiting, lower leg edema. Also reports good sleep      Objective :  Temp:  [98.3 °F (36.8 °C)-99.1 °F (37.3 °C)] 98.4 °F (36.9 °C)  HR:  [] 131  BP: (130-136)/(82-88) 136/88  Resp:  [18-20] 18  SpO2:  [93 %-95 %] 95 %  O2 Device: None (Room air)    There is no height or weight on file to calculate BMI.     Input and Output Summary (last 24 hours):     Intake/Output Summary (Last 24 hours) at 1/2/2025 1409  Last data filed at 1/2/2025 1100  Gross per 24 hour   Intake 1200 ml   Output 800 ml   Net 400 ml       Physical Exam  Vitals and nursing note reviewed.   Constitutional:       Appearance: He is well-developed and normal weight.   HENT:      Head: Normocephalic and atraumatic.      Nose: Nose normal.      Mouth/Throat:      Mouth: Mucous membranes are moist.   Eyes:      Conjunctiva/sclera: Conjunctivae normal.   Cardiovascular:      Rate and Rhythm: Normal rate and regular rhythm.      Heart sounds: Normal heart sounds. No murmur heard.  Pulmonary:      Effort: Pulmonary effort is normal. No respiratory distress.      Breath sounds: Normal breath sounds.   Abdominal:      General: Abdomen is flat.      Palpations: Abdomen is soft.      Tenderness: There is no abdominal tenderness.   Musculoskeletal:         General: No swelling.      Cervical back: Neck supple.       Right lower leg: No edema.      Left lower leg: No edema.   Skin:     General: Skin is warm and dry.      Capillary Refill: Capillary refill takes less than 2 seconds.   Neurological:      General: No focal deficit present.      Mental Status: He is alert and oriented to person, place, and time. Mental status is at baseline.   Psychiatric:         Mood and Affect: Mood normal.           Lines/Drains:      Central Line:  Goal for removal: N/A - Chronic PICC               Lab Results: I have reviewed the following results:   Results from last 7 days   Lab Units 01/02/25  0513   WBC Thousand/uL 12.83*   HEMOGLOBIN g/dL 8.6*   HEMATOCRIT % 27.8*   PLATELETS Thousands/uL 419*   SEGS PCT % 82*   LYMPHO PCT % 7*   MONO PCT % 8   EOS PCT % 1     Results from last 7 days   Lab Units 01/02/25  0513 12/28/24  0528 12/27/24  0608   SODIUM mmol/L 139   < > 137   POTASSIUM mmol/L 4.0   < > 3.3*   CHLORIDE mmol/L 109*   < > 107   CO2 mmol/L 21   < > 20*   BUN mg/dL 13   < > 13   CREATININE mg/dL 0.71   < > 0.78   ANION GAP mmol/L 9   < > 10   CALCIUM mg/dL 8.2*   < > 7.0*   ALBUMIN g/dL  --   --  3.4*   TOTAL BILIRUBIN mg/dL  --   --  0.54   ALK PHOS U/L  --   --  70   ALT U/L  --   --  11   AST U/L  --   --  15   GLUCOSE RANDOM mg/dL 88   < > 93    < > = values in this interval not displayed.         Results from last 7 days   Lab Units 01/02/25  1033   POC GLUCOSE mg/dl 91               Recent Cultures (last 7 days):   Results from last 7 days   Lab Units 12/27/24  1326   C DIFF TOXIN B BY PCR  Negative       Imaging Results Review: I personally reviewed the following image studies/reports in PACS and discussed pertinent findings with Radiology: ct. My interpretation of the radiology images/reports is: Severe PAD.  Other Study Results Review: EKG was reviewed.     Last 24 Hours Medication List:     Current Facility-Administered Medications:     acetaminophen (TYLENOL) tablet 650 mg, Q6H PRN    allopurinol (ZYLOPRIM) tablet 100  mg, Daily    aspirin chewable tablet 81 mg, Daily    atorvastatin (LIPITOR) tablet 80 mg, Daily With Dinner    bismuth subsalicylate (PEPTO BISMOL) oral suspension 524 mg, Q6H PRN    clopidogrel (PLAVIX) tablet 75 mg, Daily    Diclofenac Sodium (VOLTAREN) 1 % topical gel 2 g, 4x Daily    [Held by provider] Empagliflozin (JARDIANCE) tablet 10 mg, QAM    enoxaparin (LOVENOX) subcutaneous injection 40 mg, Daily    ezetimibe (ZETIA) tablet 10 mg, Daily    famotidine (PEPCID) tablet 20 mg, Daily    ferrous sulfate tablet 325 mg, Daily    finasteride (PROSCAR) tablet 5 mg, Daily    [Held by provider] furosemide (LASIX) tablet 40 mg, Daily    gabapentin (NEURONTIN) capsule 600 mg, BID    HYDROmorphone HCl (DILAUDID) injection 0.2 mg, Q4H PRN    loperamide (IMODIUM) capsule 2 mg, 4x Daily PRN    [Held by provider] losartan (COZAAR) tablet 12.5 mg, Daily    menthol-methyl salicylate (BENGAY) 10-15 % cream, 4x Daily PRN    methocarbamol (ROBAXIN) tablet 500 mg, TID    metoprolol succinate (TOPROL-XL) 24 hr tablet 25 mg, Daily    naloxone (NARCAN) 0.04 mg/mL syringe 0.04 mg, Q1MIN PRN    ondansetron (ZOFRAN) injection 4 mg, Q6H PRN    oxyCODONE (ROXICODONE) split tablet 2.5 mg, Q4H PRN **OR** oxyCODONE (ROXICODONE) IR tablet 5 mg, Q4H PRN    pentoxifylline (TRENtal) ER tablet 400 mg, TID With Meals    potassium chloride (Klor-Con M20) CR tablet 20 mEq, Daily    predniSONE tablet 5 mg, BID With Meals    tamsulosin (FLOMAX) capsule 0.4 mg, Daily With Dinner    Administrative Statements   Today, Patient Was Seen By: Tato Nixon MD  I have spent a total time of 60 minutes in caring for this patient on the day of the visit/encounter including Diagnostic results, Prognosis, Risks and benefits of tx options, Instructions for management, Patient and family education, Importance of tx compliance, Risk factor reductions, Impressions, Counseling / Coordination of care, Documenting in the medical record, Reviewing / ordering  tests, medicine, procedures  , Obtaining or reviewing history  , and Communicating with other healthcare professionals .    **Please Note: This note may have been constructed using a voice recognition system.**

## 2025-01-02 NOTE — ASSESSMENT & PLAN NOTE
Right foot great toe gangrene with concern for underlying osteomyelitis  Severe peripheral vascular disease  Ulceration left foot midfoot lateral  History of extensive peripheral arterial disease  Podiatry and Vascular onboard  Continue aspirin, Plavix, atorvastatin, pentoxifylline  Transferred to [place] on 1/1/2025.  Discussed with podiatry and vascular.  Per vascular the CTA of the lower extremity shows very severe disease.  Not able to do limflow procedure.  Reached out to podiatry and they are also limited due to poor vasculature of the leg and are unable to do surgery.  Vascular said they will swing by after their operations today and discussed with the patient.

## 2025-01-02 NOTE — CONSULTS
Vascular Surgery    Pt known to me with PAD.  Had planned for RLE Limflow today.  He had right SFA stent thrombolysis 10/2024 and has stage IV prostate Ca.  He was admitted with diarrhea 2/2 his chemotherapy.  He was transferred to [facility] from [place] for his procedure.  Absent right popliteal pulse was noted on exam and CTA with runoff was done yesterday.  This shows right SFA stent re-occlusion and distal left CFA and proximal left SFA occlusion.  He has tissue loss on both feet.  At this point I do not feel there are any reasonable RLE revascularization options.  I did offer left CFA/SFA endovascular intervention and he wishes to proceed while admitted.  Will consult IR for LLE intervention and proceed with palliative wound on the RLE.

## 2025-01-03 LAB
ANION GAP SERPL CALCULATED.3IONS-SCNC: 8 MMOL/L (ref 4–13)
BUN SERPL-MCNC: 14 MG/DL (ref 5–25)
CALCIUM SERPL-MCNC: 8.2 MG/DL (ref 8.4–10.2)
CHLORIDE SERPL-SCNC: 107 MMOL/L (ref 96–108)
CO2 SERPL-SCNC: 24 MMOL/L (ref 21–32)
CREAT SERPL-MCNC: 0.68 MG/DL (ref 0.6–1.3)
ERYTHROCYTE [DISTWIDTH] IN BLOOD BY AUTOMATED COUNT: 14.6 % (ref 11.6–15.1)
GFR SERPL CREATININE-BSD FRML MDRD: 90 ML/MIN/1.73SQ M
GLUCOSE SERPL-MCNC: 113 MG/DL (ref 65–140)
GLUCOSE SERPL-MCNC: 89 MG/DL (ref 65–140)
HCT VFR BLD AUTO: 27.8 % (ref 36.5–49.3)
HGB BLD-MCNC: 8.5 G/DL (ref 12–17)
INR PPP: 1.1 (ref 0.85–1.19)
MCH RBC QN AUTO: 29.2 PG (ref 26.8–34.3)
MCHC RBC AUTO-ENTMCNC: 30.6 G/DL (ref 31.4–37.4)
MCV RBC AUTO: 96 FL (ref 82–98)
PLATELET # BLD AUTO: 455 THOUSANDS/UL (ref 149–390)
PMV BLD AUTO: 10 FL (ref 8.9–12.7)
POTASSIUM SERPL-SCNC: 3.9 MMOL/L (ref 3.5–5.3)
PROTHROMBIN TIME: 14.5 SECONDS (ref 12.3–15)
RBC # BLD AUTO: 2.91 MILLION/UL (ref 3.88–5.62)
SODIUM SERPL-SCNC: 139 MMOL/L (ref 135–147)
WBC # BLD AUTO: 12.4 THOUSAND/UL (ref 4.31–10.16)

## 2025-01-03 PROCEDURE — 85027 COMPLETE CBC AUTOMATED: CPT

## 2025-01-03 PROCEDURE — 85610 PROTHROMBIN TIME: CPT

## 2025-01-03 PROCEDURE — 97163 PT EVAL HIGH COMPLEX 45 MIN: CPT

## 2025-01-03 PROCEDURE — 04FS3ZZ FRAGMENTATION OF LEFT POSTERIOR TIBIAL ARTERY, PERCUTANEOUS APPROACH: ICD-10-PCS | Performed by: STUDENT IN AN ORGANIZED HEALTH CARE EDUCATION/TRAINING PROGRAM

## 2025-01-03 PROCEDURE — 04FN3ZZ FRAGMENTATION OF LEFT POPLITEAL ARTERY, PERCUTANEOUS APPROACH: ICD-10-PCS | Performed by: STUDENT IN AN ORGANIZED HEALTH CARE EDUCATION/TRAINING PROGRAM

## 2025-01-03 PROCEDURE — 99233 SBSQ HOSP IP/OBS HIGH 50: CPT

## 2025-01-03 PROCEDURE — 93005 ELECTROCARDIOGRAM TRACING: CPT

## 2025-01-03 PROCEDURE — 97167 OT EVAL HIGH COMPLEX 60 MIN: CPT

## 2025-01-03 PROCEDURE — 82948 REAGENT STRIP/BLOOD GLUCOSE: CPT

## 2025-01-03 PROCEDURE — 80048 BASIC METABOLIC PNL TOTAL CA: CPT

## 2025-01-03 PROCEDURE — NC001 PR NO CHARGE: Performed by: PHYSICIAN ASSISTANT

## 2025-01-03 PROCEDURE — NC001 PR NO CHARGE: Performed by: PODIATRIST

## 2025-01-03 PROCEDURE — 04FL3ZZ FRAGMENTATION OF LEFT FEMORAL ARTERY, PERCUTANEOUS APPROACH: ICD-10-PCS | Performed by: STUDENT IN AN ORGANIZED HEALTH CARE EDUCATION/TRAINING PROGRAM

## 2025-01-03 RX ADMIN — ATORVASTATIN CALCIUM 80 MG: 80 TABLET, FILM COATED ORAL at 17:09

## 2025-01-03 RX ADMIN — ALLOPURINOL 100 MG: 100 TABLET ORAL at 09:52

## 2025-01-03 RX ADMIN — CLOPIDOGREL BISULFATE 75 MG: 75 TABLET, FILM COATED ORAL at 09:52

## 2025-01-03 RX ADMIN — EZETIMIBE 10 MG: 10 TABLET ORAL at 09:52

## 2025-01-03 RX ADMIN — OXYCODONE HYDROCHLORIDE 5 MG: 5 TABLET ORAL at 04:37

## 2025-01-03 RX ADMIN — METHOCARBAMOL 500 MG: 500 TABLET ORAL at 21:36

## 2025-01-03 RX ADMIN — METOPROLOL SUCCINATE 25 MG: 25 TABLET, EXTENDED RELEASE ORAL at 09:52

## 2025-01-03 RX ADMIN — ENOXAPARIN SODIUM 40 MG: 40 INJECTION SUBCUTANEOUS at 09:52

## 2025-01-03 RX ADMIN — POTASSIUM CHLORIDE 20 MEQ: 1500 TABLET, EXTENDED RELEASE ORAL at 09:52

## 2025-01-03 RX ADMIN — GABAPENTIN 600 MG: 300 CAPSULE ORAL at 17:08

## 2025-01-03 RX ADMIN — FAMOTIDINE 20 MG: 20 TABLET, FILM COATED ORAL at 09:52

## 2025-01-03 RX ADMIN — PENTOXIFYLLINE 400 MG: 400 TABLET, EXTENDED RELEASE ORAL at 09:53

## 2025-01-03 RX ADMIN — TAMSULOSIN HYDROCHLORIDE 0.4 MG: 0.4 CAPSULE ORAL at 17:08

## 2025-01-03 RX ADMIN — FINASTERIDE 5 MG: 5 TABLET, FILM COATED ORAL at 09:52

## 2025-01-03 RX ADMIN — PREDNISONE 5 MG: 5 TABLET ORAL at 17:09

## 2025-01-03 RX ADMIN — GABAPENTIN 600 MG: 300 CAPSULE ORAL at 09:52

## 2025-01-03 RX ADMIN — PENTOXIFYLLINE 400 MG: 400 TABLET, EXTENDED RELEASE ORAL at 12:40

## 2025-01-03 RX ADMIN — HYDROMORPHONE HYDROCHLORIDE 0.2 MG: 0.2 INJECTION, SOLUTION INTRAMUSCULAR; INTRAVENOUS; SUBCUTANEOUS at 06:01

## 2025-01-03 RX ADMIN — PREDNISONE 5 MG: 5 TABLET ORAL at 09:52

## 2025-01-03 RX ADMIN — METHOCARBAMOL 500 MG: 500 TABLET ORAL at 17:08

## 2025-01-03 RX ADMIN — OXYCODONE HYDROCHLORIDE 5 MG: 5 TABLET ORAL at 10:03

## 2025-01-03 RX ADMIN — FERROUS SULFATE TAB 325 MG (65 MG ELEMENTAL FE) 325 MG: 325 (65 FE) TAB at 09:52

## 2025-01-03 RX ADMIN — MUSCLE RUB CREAM: 100; 150 CREAM TOPICAL at 21:36

## 2025-01-03 RX ADMIN — ASPIRIN 81 MG CHEWABLE TABLET 81 MG: 81 TABLET CHEWABLE at 09:52

## 2025-01-03 RX ADMIN — PENTOXIFYLLINE 400 MG: 400 TABLET, EXTENDED RELEASE ORAL at 17:09

## 2025-01-03 RX ADMIN — METHOCARBAMOL 500 MG: 500 TABLET ORAL at 09:52

## 2025-01-03 NOTE — ASSESSMENT & PLAN NOTE
Right foot great toe gangrene with concern for underlying osteomyelitis  Severe peripheral vascular disease  Ulceration left foot midfoot lateral  History of extensive peripheral arterial disease  Podiatry and Vascular onboard  Continue aspirin, Plavix, atorvastatin, pentoxifylline  Transferred to [place] on 1/1/2025.  Discussed with podiatry and vascular.  Per vascular the CTA of the lower extremity shows very severe disease.  Not able to do limflow procedure.  Reached out to podiatry and they are also limited due to poor vasculature of the leg and are unable to do surgery.  Per vascular patient has severe SFA stent occlusion and distal left CFA and proximal left SFA occlusion  No more revasularization options  Palliative wound care advised  IR consuled for IR MIKE angio Tuesday next week  Midnight previous  Hold am lovenox during that time

## 2025-01-03 NOTE — CONSULTS
e-Consult (IPC)  - Interventional Radiology  Royce Rene 79 y.o. male MRN: [id number]  Unit/Bed#: [facility] 523-01 Encounter: [id number]          Interventional Radiology has been consulted to evaluate Royce Rene    We were consulted concerning this patient with PAD with b/l LE tissue loss.    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  Patient was admitted with generalized weakness, electrolyte imbalance.  He improved and was pending rehab placement, however by the time he was improved for discharge, patient was only 2 days from planned vascular intervention (Limflow).  It was decided to transfer patient to [facility] for vascular intervention.  Upon patient's arrival to [facility], unfortunately noted to have no palpable popliteal RLE pulse.  CTA with runoff completed yesterday noted reocclusion of right SFA stent as well as distal left CFA and proximal left SFA conclusion with known tissue loss in both feet.  Per vascular notes, there is no reasonable RLE revascularization options, so no further intervention planned on RLE.  IR consulted to evaluate patient's LLE for possible intervention.    -Case discussed with Dr. Cardenas and Dr. Solomon  -Plan for IR LLE angiogram with possible intervention, tentatively Tuesday next week, pending IR schedule  -NPO at midnight prior  -Appreciate anesthesia assistance  -Hold AM Lovenox  -Appreciate vascular recommendations  -Remainder of care per primary team    11-20 minutes, >50% of the total time devoted to medical  consultative verbal/EMR discussion between providers. Written report will be generated in the EMR.     Thank you for allowing Interventional Radiology to participate in the care of Royce Rene. Please don't hesitate to call or TigerText us with any questions.     Leigh Muse PA-C

## 2025-01-03 NOTE — WOUND OSTOMY CARE
Consult Note - Wound   Royce BAER Free 79 y.o. male MRN: [id number]  Unit/Bed#: [facility] 523-01 Encounter: [id number]        History and Present Illness:  Patient is a 80 yo male that was admitted to [facility] for treatment of PAD. Patient was transferred from [facility].  PMH of PAD, CHF systolic and diastolic, diarrhea, prostate cancer. Patient is a min/mod assist for turning and repositioning. Patient is continent of bowel and bladder is managed via internal urinary catheter. On assessment, patient is seen lying on regular mattress.     Wound Care was consulted for b/l feet wounds and left face wound.      Assessment Findings:   Podiatry is following and managing b/l feet/toe wounds - treatment to areas per their recommendations.     Patient declined assessment of sacro-buttocks- he reports no skin loss or drainage in area.     Left Face Wound: patient reports that wound has been in place for 10 years. Irregular and atypical in shape with full thickness skin loss. Wound bed with beefy red, bleeding, hypergranulation tissue. Patient reports that he is unsure of etiology. Selene-wound is fragile. Scant to small sanguinous/ bloody drainage noted. A silicone bordered foam dressing was applied to the area. Primary team made aware of wound and need for outpatient dermatology referral.     No induration, fluctuance, odor, warmth/temperature differences, redness, or purulence noted to the above noted wounds and skin areas assessed. New dressings applied per orders listed below. Patient tolerated well- no s/s of non-verbal pain or discomfort observed during the encounter. Bedside nurse aware of plan of care. See flow sheets for more detailed assessment findings.      Orders listed below and wound care will continue to follow, call or Secure Chat Message with questions.     Skin Care Plan:  1-Preventative Hydraguard to B/L Sacro-Buttocks BID and PRN  2-Turn/reposition q2h or when medically stable for pressure re-distribution  on skin.  3-Elevate heels to offload pressure.  4-Moisturize skin daily with skin nourishing cream  5-Ehob cushion in chair when out of bed.  6-B/L Foot/Toe Wounds: per Podiatry recommendations.  7-Left Face Wound: Cleanse with NSS and pat dry. Apply a small 2x2 silicone bordered foam dressing to wound. Zcahery with T for treatment and change every other day or PRN      Wounds:  Wound 10/24/24 Other (comment) Face Left (Active)   Wound Image   01/03/25 1157   Wound Description Beefy red;Bleeding;Hypergranulation 01/03/25 1157   Selene-wound Assessment Fragile 01/03/25 1157   Wound Length (cm) 1.5 cm 01/03/25 1157   Wound Width (cm) 1.5 cm 01/03/25 1157   Wound Depth (cm) 0.2 cm 01/03/25 1157   Wound Surface Area (cm^2) 2.25 cm^2 01/03/25 1157   Wound Volume (cm^3) 0.45 cm^3 01/03/25 1157   Calculated Wound Volume (cm^3) 0.45 cm^3 01/03/25 1157   Change in Wound Size % -350 01/03/25 1157   Drainage Amount Scant 01/03/25 1157   Drainage Description Sanguineous;Bloody 01/03/25 1157   Non-staged Wound Description Full thickness 01/03/25 1157   Treatments Cleansed;Irrigation with NSS;Site care 01/03/25 1157   Dressing Foam, Silicon (eg. Allevyn, etc) 01/03/25 1157   Dressing Changed Changed 01/03/25 1157   Patient Tolerance Tolerated well 01/03/25 1157   Dressing Status Clean;Dry;Intact 01/03/25 1157               Odalis Chávez RN, BSN, CWOCN

## 2025-01-03 NOTE — ASSESSMENT & PLAN NOTE
"Recent Labs     01/01/25  0515 01/02/25  0513 01/03/25  0453   SODIUM 138 139 139     No results found for: \"OSMOUA\", \"NAUR\", \"OSMOLALITSER\"    Resolved    "

## 2025-01-03 NOTE — ASSESSMENT & PLAN NOTE
Lab Results   Component Value Date    HGB 8.5 (L) 01/03/2025    HGB 8.6 (L) 01/02/2025    HGB 8.9 (L) 01/01/2025    HGB 8.6 (L) 12/31/2024    HGB 9.0 (L) 12/29/2024    CONCFE 10 (L) 12/28/2024    IRON 21 (L) 12/28/2024    FERRITIN 1,012 (H) 12/28/2024    TIBC 214.2 (L) 12/28/2024     Continue iron supplementation

## 2025-01-03 NOTE — RESTORATIVE TECHNICIAN NOTE
Restorative Technician Note      Patient Name: Royce Rene     Restorative Tech Visit Date: 01/03/25  Note Type: Mobility  Patient Position Upon Consult: Bedside chair  Activity Performed: Transferred  Assistive Device: Other (Comment) (HHA x2)  Patient Position at End of Consult: Supine; All needs within reach; Bed/Chair alarm activated

## 2025-01-03 NOTE — OCCUPATIONAL THERAPY NOTE
Occupational Therapy Evaluation     Patient Name: Royce Rene  Today's Date: 1/3/2025  Problem List  Principal Problem:    PAD (peripheral artery disease) ([facility])  Active Problems:    Chronic combined systolic and diastolic CHF (congestive heart failure) ([facility])    Prostate cancer ([facility])    CAD (coronary artery disease)    Urinary retention    Ambulatory dysfunction    Ulcer of left foot, limited to breakdown of skin ([facility])    Hyponatremia    Diarrhea    Right ankle pain    Iron deficiency anemia    Past Medical History  Past Medical History:   Diagnosis Date    Cancer ([facility]) 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    Coronary artery disease 2001    S/p stenting to circumflex and RCA    HFrEF (heart failure with reduced ejection fraction) ([facility]) 06/14/2021    High cholesterol     Pacemaker     Prostate cancer ([facility])      Past Surgical History  Past Surgical History:   Procedure Laterality Date    CARDIAC CATHETERIZATION      CARDIAC ELECTROPHYSIOLOGY PROCEDURE N/A 12/23/2021    Procedure: Implant ICD - bi ventricular;  Surgeon: Jonas Oviedo MD;  Location: [facility];  Service: Cardiology    COLONOSCOPY      IR LOWER EXTREMITY ANGIOGRAM  04/18/2023    IR LOWER EXTREMITY ANGIOGRAM  10/30/2024    AZ TEAEC W/WO PATCH GRAFT COMMON FEMORAL Right 07/09/2021    Procedure: ENDARTERECTOMY ARTERIAL FEMORAL;  Surgeon: Serina Cook DO;  Location: [facility];  Service: Vascular         01/03/25 0851   OT Last Visit   OT Visit Date 01/03/25   Note Type   Note type Evaluation   Pain Assessment   Pain Assessment Tool 0-10   Pain Score 10 - Worst Possible Pain   Pain Location/Orientation Orientation: Bilateral;Location: Foot   Patient's Stated Pain Goal No pain   Hospital Pain Intervention(s) Repositioned;Ambulation/increased activity   Restrictions/Precautions   Weight Bearing Precautions Per Order Yes   RLE Weight Bearing Per Order (S)  WBAT   LLE Weight Bearing Per Order (S)  WBAT   Braces or Orthoses   (b/l sx shoe)   Other Precautions  "Chair Alarm;Bed Alarm;Cognitive;Multiple lines;Telemetry;Fall Risk;Pain;WBS   Home Living   Type of Home House   Home Layout Multi-level;Stairs to enter with rails  (stair glide entrance, 5-6 steps to main level)   Bathroom Shower/Tub Tub/shower unit  (spongebathes as of recently)   Bathroom Toilet Raised   Bathroom Equipment Grab bars in shower;Shower chair;Toilet raiser   Home Equipment Walker;Cane;Wheelchair-electric  (x2 electric scooters)   Additional Comments Pt reports living w/ son in multi level home w/ stair glide + 5-6 steps to main level. Pt uses electric scooter primarily. Keeps on upstairs and one for community use.   Prior Function   Level of [place] Independent with ADLs;Independent with functional mobility;Needs assistance with IADLS   Lives With Son   Receives Help From Family   IADLs Family/Friend/Other provides transportation;Family/Friend/Other provides meals;Independent with medication management   Falls in the last 6 months 1 to 4  (reports 3)   Vocational Retired   Comments Pt reports at baseline I w/ ADLs. Recently has been spongebathing and staying in bed due to foot pain. Has MOWs. Supportive son who assists w/ IADLs at baseline.   Lifestyle   Autonomy At baseline Pt reports I w/ ADLs. Recently having assist w/ ADLs and IADLs due to increased pain.   Reciprocal Relationships son   Service to Others Retired   Intrinsic Gratification Enjoys TV   General   Family/Caregiver Present No   Subjective   Subjective \"I'm going to need my foot fixed before I can get up\"   ADL   Where Assessed Edge of bed   Eating Assistance 5  Supervision/Setup   Grooming Assistance 5  Supervision/Setup   UB Bathing Assistance 4  Minimal Assistance   LB Bathing Assistance 2  Maximal Assistance   UB Dressing Assistance 4  Minimal Assistance   LB Dressing Assistance 2  Maximal Assistance   Toileting Assistance  2  Maximal Assistance   Functional Assistance 3  Moderate Assistance   Bed Mobility   Supine to Sit 5  " Supervision   Additional items HOB elevated;Bedrails;Increased time required;Verbal cues;LE management   Sit to Supine Unable to assess   Additional Comments Pt greeted supine and left OOB in chair w/ alarm on and all needs within reach   Transfers   Sit to Stand 4  Minimal assistance   Additional items Assist x 2;Increased time required;Verbal cues   Stand to Sit 4  Minimal assistance   Additional items Assist x 2;Increased time required;Verbal cues   Additional Comments w/ RW. VC for hand placement   Functional Mobility   Functional Mobility 4  Minimal assistance   Additional Comments Ax2. Pt takes steps from EOB to chair w/ Min Ax2 using RW.   Additional items Rolling walker   Balance   Static Sitting Fair +   Dynamic Sitting Fair   Static Standing Poor +   Dynamic Standing Poor   Ambulatory Poor   Activity Tolerance   Activity Tolerance Patient limited by pain;Patient limited by fatigue   Medical Staff Made Aware PT ruby Rae w/ PT due to medical complexity and multiple comorbidities   Nurse Made Aware RN cleared   RUE Assessment   RUE Assessment WFL   LUE Assessment   LUE Assessment WFL   Hand Function   Gross Motor Coordination Functional   Fine Motor Coordination Functional   Sensation   Light Touch Severe deficits in the RLE;Severe deficits in the LLE  (Impaired sensation/severe neuropathy in BLE)   Cognition   Overall Cognitive Status Impaired   Arousal/Participation Alert;Responsive;Cooperative   Attention Attends with cues to redirect   Orientation Level Oriented X4   Memory Decreased short term memory;Decreased recall of recent events;Decreased recall of precautions   Following Commands Follows one step commands with increased time or repetition   Comments Pt is cooperative. Requires increased encouragement and VC to participate. VC for redirection, safety, and proper technique w/ transfers   Assessment   Limitation Decreased ADL status;Decreased endurance;Decreased self-care trans;Decreased  high-level ADLs;Decreased sensation;Decreased Safe judgement during ADL;Decreased cognition   Prognosis Good   Assessment Pt is a 79 y.o. male seen for OT evaluation s/p admission to [facility] on 1/1/2025 due to gangrene of R foot. Pt diagnosed with PAD (peripheral artery disease) ([facility]).  Pt  has a past medical history of Cancer ([facility]), Coronary artery disease, HFrEF (heart failure with reduced ejection fraction) ([facility]), High cholesterol, Pacemaker, and Prostate cancer ([facility]).  Pt with active OT evaluation/treatment and activity orders. Pt reports living w/ son in multi level home w/ stair glide + 5-6 steps. PTA, Pt was I w/ ADL, had assist w/ IADL and functional mobility, was not driving and was using electric scooter  at baseline. Pt agreeable and willing to participate in OT evaluation. Pt was greeted supine and left OOB in chair w/ alarm activated and all needs within reach. During evaluation, pt is Supervision bed mobility; Min A UB ADLs; Min Ax2 STS, functional mobility; Max A LB ADLs.  Pt currently presents with impairments in the following categories -steps to enter environment, limited home support, difficulty performing ADLS, and difficulty performing IADLS  activity tolerance, endurance, standing balance/tolerance, sitting balance/tolerance, insight, safety , judgement , and attention . These impairments, as well as pt's fatigue, pain, WBS , risk for falls, and home environment  limit pt's ability to safely engage in all baseline areas of occupation, includingeating, grooming, bathing, dressing, toileting, functional mobility/transfers, community mobility, medication management, social participation , and leisure activities . The patient's raw score on the AM-PAC Daily Activity Inpatient Short Form is 15. A raw score of less than 19 suggests the patient may benefit from discharge to post-acute rehabilitation services. Please refer to the recommendation of the Occupational Therapist for safe  discharge planning.    Pt would benefit from continued acute OT services throughout hospital course in order to maximize Pt's independence and overall occupational performance. Plan for OT interventions 2-3x per week. From OT standpoint,  recommend Level II (Moderate Resource Intensity) upon d/c when pt medically stable to d/c from acute care. Will continue to follow.   Goals   Patient Goals to reduce pain   LTG Time Frame 10-14   Long Term Goal See goals below   Plan   Treatment Interventions ADL retraining;Functional transfer training;UE strengthening/ROM;Endurance training;Cognitive reorientation;Patient/family training;Equipment evaluation/education;Compensatory technique education;Continued evaluation;Energy conservation;Activityengagement   Goal Expiration Date 01/17/25   OT Treatment Day 0   OT Frequency 2-3x/wk   Discharge Recommendation   Rehab Resource Intensity Level, OT II (Moderate Resource Intensity)   AM-PAC Daily Activity Inpatient   Lower Body Dressing 2   Bathing 2   Toileting 2   Upper Body Dressing 3   Grooming 3   Eating 3   Daily Activity Raw Score 15   Daily Activity Standardized Score (Calc for Raw Score >=11) 34.69   AM-PAC Applied Cognition Inpatient   Following a Speech/Presentation 3   Understanding Ordinary Conversation 4   Taking Medications 3   Remembering Where Things Are Placed or Put Away 3   Remembering List of 4-5 Errands 3   Taking Care of Complicated Tasks 2   Applied Cognition Raw Score 18   Applied Cognition Standardized Score 38.07   End of Consult   Education Provided Yes   Patient Position at End of Consult Bed/Chair alarm activated;All needs within reach;Bedside chair   Nurse Communication Nurse aware of consult     OT Goals:     - Pt will be Min A  with LB ADL by the time of discharge.    - Pt will be Mod I with UB ADL by the time of discharge.    - Pt will complete toileting routine (transfers, hygiene, and clothing management) with Min A   to maximize independence and  return to prior level of function.    - Pt will complete bed mobility supine >< sit w/ Mod I to maximize independence and return home.    - Pt will transfer to bed, chair, and toilet w/ Mod I using AD / DME as needed to maximize independence and reduce burden of care.     - Pt will ambulate household distances w/ Mod I using least restrictive device to maximize independence and return home.     - Pt will increase activity tolerance (and sitting tolerance) by eating all meals OOB in the chair.     - Pt will increase standing tolerance to 15 minutes to maximize independence w/ grooming tasks standing at the sink.     - Pt will tolerate therapeutic activities for greater than 30 minutes in order to increase tolerance for functional activities.     - Pt will participate in ongoing OT assessment of cognitive skills to assist with safe d/c planning/recommendations.       Mariam Dowling, BELKIS, OTR/L

## 2025-01-03 NOTE — PLAN OF CARE
Problem: PHYSICAL THERAPY ADULT  Goal: Performs mobility at highest level of function for planned discharge setting.  See evaluation for individualized goals.  Description: Treatment/Interventions: ADL retraining, Functional transfer training, LE strengthening/ROM, Elevations, Therapeutic exercise, Endurance training, Patient/family training, Bed mobility, Gait training, Spoke to nursing, Spoke to case management, OT  Equipment Recommended: Walker       See flowsheet documentation for full assessment, interventions and recommendations.  Note: Prognosis: Fair  Problem List: Decreased endurance, Impaired balance, Decreased mobility, Impaired judgement, Decreased safety awareness, Obesity, Decreased skin integrity, Pain, Decreased range of motion, Decreased strength, Orthopedic restrictions  Assessment: PT completed evaluation of 79 y.o. male admitted to [facility] on 1/1/2025 with PAD (peripheral artery disease) ([facility]). Patient's current status instabilities include multiple lines, ongoing pain, continuous O2/HR monitoring, regression in function from baseline. Patient  has a past medical history of Cancer ([facility]) (01/2002), Coronary artery disease (2001), HFrEF (heart failure with reduced ejection fraction) ([facility]) (06/14/2021), High cholesterol, Pacemaker, and Prostate cancer ([facility]). At baseline, pt resides with son in multi level home with stair glide entrance and 5-6 steps to main living area and was independent ADLs prior to hospital admission. Patient presents at time of PT evaluation functioning below baseline and currently w/ overall mobility deficits due to: impaired balance, decreased endurance, gait dysfunction, decreased activity tolerance and fall risk. During PT evaluation, patient currently is requiring supervision for bed mobility skills;  min assist x2 for functional transfers and  min assist x2 for ambulation with RW. Patient performed supine to sit to edge of bed requiring HOB elevated,  increased time, verbal cues for set up, and use of bed rails for added support. Patient ambulated 3' with RW towards bedside chair, requiring occasional verbal cues for utilization of b/l UE to offload pressure/pain on RLE, RW management, navigation, and improving stride/step length. Pt left OOB in bedside chair with alarm and all needs met. This patient is functioning below their baseline and is recommended for level II. Patient will benefit from continued skilled PT this admission to achieve maximal function and safety. The patients AM-PAC Basic Mobility Inpatient Short From Raw Score is 14 . Based on AM-PAC scoring and patient presentation, PT currently recommending Level II (Moderate Resource Intensity). Please also refer to the recommendation of the Physical Therapist for safe discharge planning.  Barriers to Discharge: Decreased caregiver support, Inaccessible home environment     Rehab Resource Intensity Level, PT: II (Moderate Resource Intensity)    See flowsheet documentation for full assessment.

## 2025-01-03 NOTE — DISCHARGE INSTR - OTHER ORDERS
R AKA:  Wash incision daily w/ soap and water. Pat dry thoroughly.  Betadine paint  ABD pad  ACE wrap to assist w/ stump shrinkage/ edema control      Left foot Wound Care Instructions:   Please apply betadine paint to the left toe and lateral foot eschars and cover with regular 4x4 gauze every other day.        Skin Care Plan:  1-Preventative Hydraguard to B/L Sacro-Buttocks BID and PRN  2-Turn/reposition q2h or when medically stable for pressure re-distribution on skin.  3-Elevate left heel to offload pressure.  4-Moisturize skin daily with skin nourishing cream  5-Ehob cushion in chair when out of bed.  6-Left Foot/Toe  Wounds: per Podiatry recommendations.  7-Left Face Wound: Cleanse with NSS and pat dry. Apply a small 2x2 silicone bordered foam dressing to wound. Zachery with T for treatment and change every other day or PRN

## 2025-01-03 NOTE — PROGRESS NOTES
Progress Note - Hospitalist   Name: Royce Rene 79 y.o. male I MRN: [id number]  [facility] I Date of Admission: 1/1/2025   Date of Service: 1/3/2025 I Hospital Day: 2     Assessment & Plan  PAD (peripheral artery disease) (HCC)    Right foot great toe gangrene with concern for underlying osteomyelitis  Severe peripheral vascular disease  Ulceration left foot midfoot lateral  History of extensive peripheral arterial disease  Podiatry and Vascular onboard  Continue aspirin, Plavix, atorvastatin, pentoxifylline  Transferred to [place] on 1/1/2025.  Discussed with podiatry and vascular.  Per vascular the CTA of the lower extremity shows very severe disease.  Not able to do limflow procedure.  Reached out to podiatry and they are also limited due to poor vasculature of the leg and are unable to do surgery.  Per vascular patient has severe SFA stent occlusion and distal left CFA and proximal left SFA occlusion  No more revasularization options  Palliative wound care advised  IR consuled for IR LLE angio Tuesday next week  Midnight previous  Hold am lovenox during that time  Ambulatory dysfunction  Baseline- close to wheelchair bounded- minimal walk.  Fall precautions  PT in [facility] recommended level II rehab.  Patient is amenable.   Will find placement after procedure  Chronic combined systolic and diastolic CHF (congestive heart failure) (HCC)  Wt Readings from Last 3 Encounters:   12/26/24 100 kg (220 lb 14.4 oz)   12/13/24 103 kg (227 lb)   12/12/24 103 kg (227 lb)   Home diuretic: PO Lasix 40mg QD \  Continue metoprolol, losartan, Jardiance  Low-salt diet  Monitor I/O, daily weights        Prostate cancer (HCC)  History of prostate cancer follows with oncology receiving chemotherapy (last on 12/12)  Follows with Dr. Hernandez  Current regimen is Cabazitaxel, Neulasta, prednisone, Lupron, denosumab  Continue to follow in outpatient setting  Diarrhea  Patient with episodes of diarrhea since receiving  "chemotherapy last week.  He also reports some nausea and vomiting.  Poor PO intake  Likely chemotherapy associated.  Improving  C. difficile and stool enteric panel negative  Supportive cares  Replete electrolytes  Imodium as needed  CAD (coronary artery disease)  Continue aspirin, metoprolol, atorvastatin  Urinary retention  Had to be straight cathed x 1  Started on Flomax and Finasteride with improvement in symptoms  Has been able to urinate on his own  Urinary retention protocol  Outpatient followup with Urology  Right ankle pain  Lab Results   Component Value Date    URICACID 11.5 (H) 12/26/2024       Patient reports right ankle pain  No obvious erythema swelling  X-ray reveals some sclerosis of calcaneus possible stress injury  CRP 79.1, uric acid 11.5.  Possible gout  Podiatry following, appreciate recommendations   Wounds do not appear to be acutely infected.  Agree with prednisone for acute gouty arthropathy.  No surgical plans  Supportive cares  Started on allopurinol  Ulcer of left foot, limited to breakdown of skin (HCC)  Pic under media.  Low suspicion for active infection  Per podiatry, no surgical plans      Hyponatremia  Recent Labs     01/01/25  0515 01/02/25  0513 01/03/25  0453   SODIUM 138 139 139     No results found for: \"OSMOUA\", \"NAUR\", \"OSMOLALITSER\"    Resolved    Iron deficiency anemia  Lab Results   Component Value Date    HGB 8.5 (L) 01/03/2025    HGB 8.6 (L) 01/02/2025    HGB 8.9 (L) 01/01/2025    HGB 8.6 (L) 12/31/2024    HGB 9.0 (L) 12/29/2024    CONCFE 10 (L) 12/28/2024    IRON 21 (L) 12/28/2024    FERRITIN 1,012 (H) 12/28/2024    TIBC 214.2 (L) 12/28/2024     Continue iron supplementation        VTE Pharmacologic Prophylaxis: VTE Score: 6 High Risk (Score >/= 5) - Pharmacological DVT Prophylaxis Ordered: enoxaparin (Lovenox). Sequential Compression Devices Ordered.    Mobility:   Basic Mobility Inpatient Raw Score: 9  -[facility] Goal: 3: Sit at edge of bed  -[facility] Achieved: 3: Sit at " edge of bed  [facility] Goal achieved. Continue to encourage appropriate mobility.    Patient Centered Rounds: I performed bedside rounds with nursing staff today.   Discussions with Specialists or Other Care Team Provider: Podiatry and vascular    Education and Discussions with Family / Patient: Updated  (son) via phone.    Current Length of Stay: 2 day(s)  Current Patient Status: Inpatient   Certification Statement: The patient, who was admitted as an inpatient, is being discharged sooner than originally anticipated due to ffoot ulcer  Discharge Plan: Anticipate discharge in 24-48 hrs to rehab facility.    Code Status: Level 1 - Full Code    Subjective   Patient does not report and headaches, shortness of breath, chest pain, abdominal pain, nausea vomiting, lower leg edema. Also reports good sleep      Objective :  Temp:  [98.2 °F (36.8 °C)-98.5 °F (36.9 °C)] 98.5 °F (36.9 °C)  HR:  [89-96] 93  BP: (102-124)/(41-82) 122/71  Resp:  [16-18] 16  SpO2:  [92 %-94 %] 94 %  O2 Device: None (Room air)    There is no height or weight on file to calculate BMI.     Input and Output Summary (last 24 hours):     Intake/Output Summary (Last 24 hours) at 1/3/2025 1322  Last data filed at 1/3/2025 1100  Gross per 24 hour   Intake 420 ml   Output 1125 ml   Net -705 ml       Physical Exam  Vitals and nursing note reviewed.   Constitutional:       Appearance: He is well-developed and normal weight.   HENT:      Head: Normocephalic and atraumatic.      Nose: Nose normal.      Mouth/Throat:      Mouth: Mucous membranes are moist.   Eyes:      Conjunctiva/sclera: Conjunctivae normal.   Cardiovascular:      Rate and Rhythm: Normal rate and regular rhythm.      Heart sounds: Normal heart sounds. No murmur heard.  Pulmonary:      Effort: Pulmonary effort is normal. No respiratory distress.      Breath sounds: Normal breath sounds.   Abdominal:      General: Abdomen is flat.      Palpations: Abdomen is soft.      Tenderness:  There is no abdominal tenderness.   Musculoskeletal:         General: No swelling.      Cervical back: Neck supple.      Right lower leg: No edema.      Left lower leg: No edema.   Skin:     General: Skin is warm and dry.      Capillary Refill: Capillary refill takes less than 2 seconds.   Neurological:      General: No focal deficit present.      Mental Status: He is alert and oriented to person, place, and time. Mental status is at baseline.   Psychiatric:         Mood and Affect: Mood normal.           Lines/Drains:  Lines/Drains/Airways       Active Status       Name Placement date Placement time Site Days    Urethral Catheter Straight-tip;Non-latex 16 Fr. 01/02/25  1611  Straight-tip;Non-latex  less than 1                    Central Line:  Goal for removal: N/A - Chronic PICC               Lab Results: I have reviewed the following results:   Results from last 7 days   Lab Units 01/03/25  0453 01/02/25  0513   WBC Thousand/uL 12.40* 12.83*   HEMOGLOBIN g/dL 8.5* 8.6*   HEMATOCRIT % 27.8* 27.8*   PLATELETS Thousands/uL 455* 419*   SEGS PCT %  --  82*   LYMPHO PCT %  --  7*   MONO PCT %  --  8   EOS PCT %  --  1     Results from last 7 days   Lab Units 01/03/25  0453   SODIUM mmol/L 139   POTASSIUM mmol/L 3.9   CHLORIDE mmol/L 107   CO2 mmol/L 24   BUN mg/dL 14   CREATININE mg/dL 0.68   ANION GAP mmol/L 8   CALCIUM mg/dL 8.2*   GLUCOSE RANDOM mg/dL 89     Results from last 7 days   Lab Units 01/03/25  0453   INR  1.10     Results from last 7 days   Lab Units 01/03/25  1034 01/02/25  1033   POC GLUCOSE mg/dl 113 91               Recent Cultures (last 7 days):   Results from last 7 days   Lab Units 12/27/24  1326   C DIFF TOXIN B BY PCR  Negative       Imaging Results Review: I personally reviewed the following image studies/reports in PACS and discussed pertinent findings with Radiology: ct. My interpretation of the radiology images/reports is: Severe PAD.  Other Study Results Review: EKG was reviewed.     Last 24  Hours Medication List:     Current Facility-Administered Medications:     acetaminophen (TYLENOL) tablet 650 mg, Q6H PRN    allopurinol (ZYLOPRIM) tablet 100 mg, Daily    aspirin chewable tablet 81 mg, Daily    atorvastatin (LIPITOR) tablet 80 mg, Daily With Dinner    bismuth subsalicylate (PEPTO BISMOL) oral suspension 524 mg, Q6H PRN    clopidogrel (PLAVIX) tablet 75 mg, Daily    Diclofenac Sodium (VOLTAREN) 1 % topical gel 2 g, 4x Daily    Empagliflozin (JARDIANCE) tablet 10 mg, QAM    enoxaparin (LOVENOX) subcutaneous injection 40 mg, Daily    ezetimibe (ZETIA) tablet 10 mg, Daily    famotidine (PEPCID) tablet 20 mg, Daily    ferrous sulfate tablet 325 mg, Daily    finasteride (PROSCAR) tablet 5 mg, Daily    furosemide (LASIX) tablet 40 mg, Daily    gabapentin (NEURONTIN) capsule 600 mg, BID    HYDROmorphone HCl (DILAUDID) injection 0.2 mg, Q4H PRN    loperamide (IMODIUM) capsule 2 mg, 4x Daily PRN    [Held by provider] losartan (COZAAR) tablet 12.5 mg, Daily    menthol-methyl salicylate (BENGAY) 10-15 % cream, 4x Daily PRN    methocarbamol (ROBAXIN) tablet 500 mg, TID    metoprolol succinate (TOPROL-XL) 24 hr tablet 25 mg, Daily    naloxone (NARCAN) 0.04 mg/mL syringe 0.04 mg, Q1MIN PRN    ondansetron (ZOFRAN) injection 4 mg, Q6H PRN    oxyCODONE (ROXICODONE) split tablet 2.5 mg, Q4H PRN **OR** oxyCODONE (ROXICODONE) IR tablet 5 mg, Q4H PRN    pentoxifylline (TRENtal) ER tablet 400 mg, TID With Meals    potassium chloride (Klor-Con M20) CR tablet 20 mEq, Daily    predniSONE tablet 5 mg, BID With Meals    tamsulosin (FLOMAX) capsule 0.4 mg, Daily With Dinner    Administrative Statements   Today, Patient Was Seen By: Tato Nixon MD  I have spent a total time of 60 minutes in caring for this patient on the day of the visit/encounter including Diagnostic results, Prognosis, Risks and benefits of tx options, Instructions for management, Patient and family education, Importance of tx compliance, Risk  factor reductions, Impressions, Counseling / Coordination of care, Documenting in the medical record, Reviewing / ordering tests, medicine, procedures  , Obtaining or reviewing history  , and Communicating with other healthcare professionals .    **Please Note: This note may have been constructed using a voice recognition system.**

## 2025-01-03 NOTE — ASSESSMENT & PLAN NOTE
Baseline- close to wheelchair bounded- minimal walk.  Fall precautions  PT in [facility] recommended level II rehab.  Patient is amenable.   Will find placement after procedure

## 2025-01-03 NOTE — PLAN OF CARE
Problem: OCCUPATIONAL THERAPY ADULT  Goal: Performs self-care activities at highest level of function for planned discharge setting.  See evaluation for individualized goals.  Description: Treatment Interventions: ADL retraining, Functional transfer training, UE strengthening/ROM, Endurance training, Cognitive reorientation, Patient/family training, Equipment evaluation/education, Compensatory technique education, Continued evaluation, Energy conservation, Activityengagement          See flowsheet documentation for full assessment, interventions and recommendations.   1/3/2025 1547 by Mariam Dowling OT  Outcome: Progressing  Note: Limitation: Decreased ADL status, Decreased endurance, Decreased self-care trans, Decreased high-level ADLs, Decreased sensation, Decreased Safe judgement during ADL, Decreased cognition  Prognosis: Good  Assessment: Pt is a 79 y.o. male seen for OT evaluation s/p admission to [facility] on 1/1/2025 due to gangrene of R foot. Pt diagnosed with PAD (peripheral artery disease) ([facility]).  Pt  has a past medical history of Cancer ([facility]), Coronary artery disease, HFrEF (heart failure with reduced ejection fraction) ([facility]), High cholesterol, Pacemaker, and Prostate cancer ([facility]).  Pt with active OT evaluation/treatment and activity orders. Pt reports living w/ son in multi level home w/ stair glide + 5-6 steps. PTA, Pt was I w/ ADL, had assist w/ IADL and functional mobility, was not driving and was using electric scooter  at baseline. Pt agreeable and willing to participate in OT evaluation. Pt was greeted supine and left OOB in chair w/ alarm activated and all needs within reach. During evaluation, pt is Supervision bed mobility; Min A UB ADLs; Min Ax2 STS, functional mobility; Max A LB ADLs.  Pt currently presents with impairments in the following categories -steps to enter environment, limited home support, difficulty performing ADLS, and difficulty performing IADLS  activity  tolerance, endurance, standing balance/tolerance, sitting balance/tolerance, insight, safety , judgement , and attention . These impairments, as well as pt's fatigue, pain, WBS , risk for falls, and home environment  limit pt's ability to safely engage in all baseline areas of occupation, includingeating, grooming, bathing, dressing, toileting, functional mobility/transfers, community mobility, medication management, social participation , and leisure activities . The patient's raw score on the -PAC Daily Activity Inpatient Short Form is 15. A raw score of less than 19 suggests the patient may benefit from discharge to post-acute rehabilitation services. Please refer to the recommendation of the Occupational Therapist for safe discharge planning.    Pt would benefit from continued acute OT services throughout hospital course in order to maximize Pt's independence and overall occupational performance. Plan for OT interventions 2-3x per week. From OT standpoint,  recommend Level II (Moderate Resource Intensity) upon d/c when pt medically stable to d/c from acute care. Will continue to follow.     Rehab Resource Intensity Level, OT: II (Moderate Resource Intensity)       1/3/2025 1547 by Mariam Dowling OT  Outcome: Progressing  Note: Limitation: Decreased ADL status, Decreased endurance, Decreased self-care trans, Decreased high-level ADLs, Decreased sensation, Decreased Safe judgement during ADL, Decreased cognition  Prognosis: Good  Assessment: Pt is a 79 y.o. male seen for OT evaluation s/p admission to [facility] on 1/1/2025 due to gangrene of R foot. Pt diagnosed with PAD (peripheral artery disease) ([facility]).  Pt  has a past medical history of Cancer ([facility]), Coronary artery disease, HFrEF (heart failure with reduced ejection fraction) ([facility]), High cholesterol, Pacemaker, and Prostate cancer ([facility]).  Pt with active OT evaluation/treatment and activity orders. Pt reports living w/ son in multi level home w/  stair glide + 5-6 steps. PTA, Pt was I w/ ADL, had assist w/ IADL and functional mobility, was not driving and was using electric scooter  at baseline. Pt agreeable and willing to participate in OT evaluation. Pt was greeted supine and left OOB in chair w/ alarm activated and all needs within reach. During evaluation, pt is Supervision bed mobility; Min A UB ADLs; Min Ax2 STS, functional mobility; Max A LB ADLs.  Pt currently presents with impairments in the following categories -steps to enter environment, limited home support, difficulty performing ADLS, and difficulty performing IADLS  activity tolerance, endurance, standing balance/tolerance, sitting balance/tolerance, insight, safety , judgement , and attention . These impairments, as well as pt's fatigue, pain, WBS , risk for falls, and home environment  limit pt's ability to safely engage in all baseline areas of occupation, includingeating, grooming, bathing, dressing, toileting, functional mobility/transfers, community mobility, medication management, social participation , and leisure activities . The patient's raw score on the -PAC Daily Activity Inpatient Short Form is 15. A raw score of less than 19 suggests the patient may benefit from discharge to post-acute rehabilitation services. Please refer to the recommendation of the Occupational Therapist for safe discharge planning.    Pt would benefit from continued acute OT services throughout hospital course in order to maximize Pt's independence and overall occupational performance. Plan for OT interventions 2-3x per week. From OT standpoint,  recommend Level II (Moderate Resource Intensity) upon d/c when pt medically stable to d/c from acute care. Will continue to follow.     Rehab Resource Intensity Level, OT: II (Moderate Resource Intensity)

## 2025-01-03 NOTE — PROGRESS NOTES
Podiatry - Progress Note  Patient: Royce Rene 79 y.o. male   MRN: [id number]  PCP: Anne Chandra MD  Unit/Bed#: [facility] 523-01 Encounter: [id number]  Date Of Visit: 01/03/25    ASSESSMENT:    Royce Rene is a 79 y.o. male with:    Right great toe dry gangrene  Ulceration left lateral midfoot   Severe peripheral vascular disease      PLAN:    Patient was seen and evaluated at bedside with all questions and concerns addressed.  Informed the patient that the right lower extremity bypass surgery has already been consulted by the vascular surgery team.  Because of it, the podiatry team will not be able to perform any surgeries.  The current plan will be local wound care with Betadine paint possible DSD dressing.  Because the patient does not have signs of infection, no surgery is planned as part.  Podiatry will also follow-up with the left lower extremity angiogram result and possible local wound care for the healing of the left foot eschars.  Patient is amenable to the plans.  Right foot shows dry, clean, intact dressing with no signs of strikethrough.  Dressing was kept intact.  Patients' lab and vital signs were reviewed. Patient is afebrile with no leukocytosis. Patient does not  meet the SIRS criteria. Will keep monitoring.  Continue local wound care, appreciate nursing assistance with dressing changes. Wound care order is in.  Patient can be Betadine paint open to air to the right foot if he cannot tolerate dressing.  Elevation and offloading on green foam wedges or pillows when non-ambulatory.  Rest of care per primary team.     Weightbearing status: Weightbearing as tolerated    SUBJECTIVE:     The patient was seen, evaluated, and assessed at bedside today. The patient was awake, alert, and in no acute distress. No acute events overnight. The patient reports pain mostly to the right foot which is exacerbated when weightbearing. Patient denies N/V/F/chills/SOB/CP.      OBJECTIVE:     Vitals:   /71 (BP  "Location: Right arm)   Pulse 93   Temp 98.5 °F (36.9 °C) (Oral)   Resp 16   SpO2 94%     Temp (24hrs), Av.3 °F (36.8 °C), Min:98.2 °F (36.8 °C), Max:98.5 °F (36.9 °C)      Physical Exam:     Lungs: Non labored breathing  Abdomen: Soft, non-tender.  Lower Extremity:  Cardiovascular status at baseline from admission.  Neurological status at baseline from admission.  Musculoskeletal status  at baseline from admission. No calf tenderness noted.     Bilateral feet showed dry, clean, intact DSD dressing with no signs of strikethrough.      Additional Data:     Labs:    Results from last 7 days   Lab Units 25  0453 25  0513   WBC Thousand/uL 12.40* 12.83*   HEMOGLOBIN g/dL 8.5* 8.6*   HEMATOCRIT % 27.8* 27.8*   PLATELETS Thousands/uL 455* 419*   SEGS PCT %  --  82*   LYMPHO PCT %  --  7*   MONO PCT %  --  8   EOS PCT %  --  1     Results from last 7 days   Lab Units 25  0453   POTASSIUM mmol/L 3.9   CHLORIDE mmol/L 107   CO2 mmol/L 24   BUN mg/dL 14   CREATININE mg/dL 0.68   CALCIUM mg/dL 8.2*     Results from last 7 days   Lab Units 25  0453   INR  1.10       * I Have Reviewed All Lab Data Listed Above.    Recent Cultures (last 7 days):     Results from last 7 days   Lab Units 24  1326   C DIFF TOXIN B BY PCR  Negative           Imaging: I have personally reviewed pertinent films in PACS  EKG, Pathology, and Other Studies: I have personally reviewed pertinent reports.    ** Please Note: Portions of the record may have been created with voice recognition software. Occasional wrong word or \"sound a like\" substitutions may have occurred due to the inherent limitations of voice recognition software. Read the chart carefully and recognize, using context, where substitutions have occurred. **      "

## 2025-01-03 NOTE — ASSESSMENT & PLAN NOTE
Wt Readings from Last 3 Encounters:   12/26/24 100 kg (220 lb 14.4 oz)   12/13/24 103 kg (227 lb)   12/12/24 103 kg (227 lb)   Home diuretic: PO Lasix 40mg QD \  Continue metoprolol, losartan, Jardiance  Low-salt diet  Monitor I/O, daily weights

## 2025-01-03 NOTE — PHYSICAL THERAPY NOTE
Physical Therapy Evaluation     Patient's Name: Royce Rene    Admitting Diagnosis  Generalized weakness [R53.1]    Problem List  Patient Active Problem List   Diagnosis    Chronic combined systolic and diastolic CHF (congestive heart failure) (HCC)    S/P AVR    Anemia    Ischemic cardiomyopathy    Ischemic leg pain    Prostate cancer (HCC)    CAD (coronary artery disease)    Urinary retention    PAD (peripheral artery disease) (HCC)    Port-A-Cath in place    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)    Embolism and 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 (HCC)    Depression, recurrent (HCC)    Cancer-related pain    Palliative care patient    Ambulatory dysfunction    Peripheral neuropathy    Financial problems    Moderate vascular dementia (HCC)    Electrolyte imbalance    Class 1 obesity due to excess calories with body mass index (BMI) of 33.0 to 33.9 in adult    Elevated liver enzymes    Proctitis    Frail elder // vulnerable adult    Prevention of chemotherapy-induced neutropenia    Osteomyelitis (HCC)    Ischemic pain of foot at rest    Ulcer of right foot with fat layer exposed (HCC)    Ulcer of left foot, limited to breakdown of skin (HCC)    Dry gangrene (HCC)    Vitamin B12 deficiency    Mixed hyperlipidemia    Exudative age-related macular degeneration, left eye, with active choroidal neovascularization (HCC)    Facial lesion    Diabetic ulcer of toe of right foot associated with type 2 diabetes mellitus, with necrosis of bone (HCC)    Hyponatremia    Diarrhea    Right ankle pain    Iron deficiency anemia    Hyperuricemia       Past Medical History  Past Medical History:   Diagnosis Date    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    Coronary artery disease 2001    S/p stenting to circumflex and RCA    HFrEF (heart failure with reduced ejection fraction) (HCC) 06/14/2021    High cholesterol     Pacemaker     Prostate cancer (HCC)        Past Surgical History  Past Surgical History:   Procedure  Laterality Date    CARDIAC CATHETERIZATION      CARDIAC ELECTROPHYSIOLOGY PROCEDURE N/A 12/23/2021    Procedure: Implant ICD - bi ventricular;  Surgeon: Jonas Oviedo MD;  Location: [facility];  Service: Cardiology    COLONOSCOPY      IR LOWER EXTREMITY ANGIOGRAM  04/18/2023    IR LOWER EXTREMITY ANGIOGRAM  10/30/2024    DC TEAEC W/WO PATCH GRAFT COMMON FEMORAL Right 07/09/2021    Procedure: ENDARTERECTOMY ARTERIAL FEMORAL;  Surgeon: Serina Cook DO;  Location: [facility];  Service: Vascular          01/03/25 0852   PT Last Visit   PT Visit Date 01/03/25   Note Type   Note type Evaluation   Additional Comments 79 y.o. male admitted to [facility] on 1/1/2025 with PAD (peripheral artery disease) ([facility]).   Pain Assessment   Pain Assessment Tool 0-10   Pain Score 10 - Worst Possible Pain   Pain Location/Orientation Orientation: Bilateral;Location: Foot;Location: Leg   Pain Onset/Description Onset: Ongoing   Effect of Pain on Daily Activities limits functional mobility   Patient's Stated Pain Goal No pain   Hospital Pain Intervention(s) Repositioned;Ambulation/increased activity;Emotional support;Rest   Multiple Pain Sites No   Restrictions/Precautions   Weight Bearing Precautions Per Order Yes   RLE Weight Bearing Per Order (S)  WBAT   LLE Weight Bearing Per Order (S)  WBAT   Braces or Orthoses   (b/l surgical shoes donned for PT evaluation)   Other Precautions Chair Alarm;Bed Alarm;Multiple lines;Telemetry;Fall Risk;Pain;WBS  (b/l surgical shoe)   Home Living   Type of Home House   Home Layout Multi-level  (stair glide entrance, 5-6 steps to main living area)   Bathroom Shower/Tub Tub/shower unit   Bathroom Toilet Raised   Bathroom Equipment Grab bars in shower;Shower chair;Toilet raiser   Bathroom Accessibility Accessible   Home Equipment Walker;Cane;Electric scooter   Additional Comments At baseline, pt resides with son in multi level home with stair glide entrance and 5-6 steps to main  "living area and was independent ADLs prior to hospital admission.  (uses electric scooter as primary means of mobility)   Prior Function   Level of [place] Independent with ADLs;Independent with functional mobility;Needs assistance with IADLS   Lives With Son   Receives Help From Family   IADLs Family/Friend/Other provides transportation;Family/Friend/Other provides meals;Independent with medication management   Falls in the last 6 months 1 to 4   Vocational Retired   Comments patient reports staying primarily in bed for the past couple of weeks due to pain   General   Additional Pertinent History Patient  has a past medical history of Cancer ([facility]) (01/2002), Coronary artery disease (2001), HFrEF (heart failure with reduced ejection fraction) ([facility]) (06/14/2021), High cholesterol, Pacemaker, and Prostate cancer ([facility]). At   Family/Caregiver Present No   Cognition   Overall Cognitive Status Impaired   Arousal/Participation Alert   Attention Attends with cues to redirect   Orientation Level Oriented X4   Memory Decreased short term memory;Decreased recall of recent events;Decreased recall of precautions   Following Commands Follows one step commands with increased time or repetition   Comments patient cooperative, requires verbal cues for re-direction to task at hand, minimal motivation to participate due to pain and frustration   Subjective   Subjective \"I don't get why you have to do this, I just want them to fix my leg\"   RLE Assessment   RLE Assessment   (4-/5 grossly)   LLE Assessment   LLE Assessment   (4-/5 grossly)   Bed Mobility   Supine to Sit 5  Supervision   Additional items HOB elevated;Bedrails;Increased time required;Verbal cues   Sit to Supine Unable to assess   Additional Comments patient left OOB in bedside chair with alarm and all needs met   Transfers   Sit to Stand 4  Minimal assistance   Additional items Assist x 2;Increased time required;Verbal cues  (vc for hand placement)   Stand to Sit 4  " Minimal assistance   Additional items Assist x 2;Increased time required;Verbal cues  (vc for eccentric lowering and use of arm rests)   Additional Comments rw   Ambulation/Elevation   Gait pattern R Knee Stephanie;L Knee Stephanie;Improper Weight shift;Antalgic;Forward Flexion;Decreased foot clearance;Decreased R stance;Shuffling;Short stride;Step to;Excessively slow   Gait Assistance 4  Minimal assist   Additional items Assist x 2;Verbal cues;Tactile cues   Assistive Device Rolling walker   Distance 3'   Stair Management Assistance Not tested   Ambulation/Elevation Additional Comments Patient ambulated 3' with RW towards bedside chair, requiring occasional verbal cues for utilization of b/l UE to offload pressure/pain on RLE, RW management, navigation, and improving stride/step length.  (further ambulation limited due to 10/10 pain)   Balance   Static Sitting Fair +   Dynamic Sitting Fair -   Static Standing Poor +   Dynamic Standing Poor   Ambulatory Poor   Endurance Deficit   Endurance Deficit Yes   Endurance Deficit Description generalized weakness, fatigue, pain   Activity Tolerance   Activity Tolerance Patient limited by fatigue;Patient limited by pain   Medical Staff Made Aware STORM Becerra; This high complexity evaluation was performed with an occupational therapist due to the patient's co-morbidities, clinically unstable presentation, and present impairments which are a regression from the patient's baseline.   Nurse Made Aware RN cleared and updated   Assessment   Prognosis Fair   Problem List Decreased endurance;Impaired balance;Decreased mobility;Impaired judgement;Decreased safety awareness;Obesity;Decreased skin integrity;Pain;Decreased range of motion;Decreased strength;Orthopedic restrictions   Assessment PT completed evaluation of 79 y.o. male admitted to [facility] on 1/1/2025 with PAD (peripheral artery disease) (HCC). Patient's current status instabilities include multiple lines, ongoing  pain, continuous O2/HR monitoring, regression in function from baseline. Patient  has a past medical history of Cancer ([facility]) (01/2002), Coronary artery disease (2001), HFrEF (heart failure with reduced ejection fraction) ([facility]) (06/14/2021), High cholesterol, Pacemaker, and Prostate cancer ([facility]). At baseline, pt resides with son in multi level home with stair glide entrance and 5-6 steps to main living area and was independent ADLs prior to hospital admission.       Patient presents at time of PT evaluation functioning below baseline and currently w/ overall mobility deficits due to: impaired balance, decreased endurance, gait dysfunction, decreased activity tolerance and fall risk. During PT evaluation, patient currently is requiring supervision for bed mobility skills;  min assist x2 for functional transfers and  min assist x2 for ambulation with RW. Patient performed supine to sit to edge of bed requiring HOB elevated, increased time, verbal cues for set up, and use of bed rails for added support. Patient ambulated 3' with RW towards bedside chair, requiring occasional verbal cues for utilization of b/l UE to offload pressure/pain on RLE, RW management, navigation, and improving stride/step length. Pt left OOB in bedside chair with alarm and all needs met.       This patient is functioning below their baseline and is recommended for level II. Patient will benefit from continued skilled PT this admission to achieve maximal function and safety. The patients AM-PAC Basic Mobility Inpatient Short From Raw Score is 14 . Based on AM-PAC scoring and patient presentation, PT currently recommending Level II (Moderate Resource Intensity). Please also refer to the recommendation of the Physical Therapist for safe discharge planning.   Barriers to Discharge Decreased caregiver support;Inaccessible home environment   Goals   Patient Goals to reduce pain   STG Expiration Date 01/17/25   Short Term Goal #1 1) Perform bed  mobility mod-I to participate in frequent repositioning and improve skin integrity; 2) Perform functional transfers mod-I to promote I with toileting and OOB mobility; 3) Ambulate 50 feet S with least restrictive device to participate in household level mobility; 4) Improve b/l LE strength by 1/2 grade in order to improve efficiency of tranfers; 5) Improve balance by 1 grade to reduce risk for falls; 6) Improve overall activity tolerance to 60 minutes in order to increase patient's ability to engage in mobility tasks; 7) Navigate 6 steps MinAx1 level in order to safely navigate multiple floors at home   PT Treatment Day 0   Plan   Treatment/Interventions ADL retraining;Functional transfer training;LE strengthening/ROM;Elevations;Therapeutic exercise;Endurance training;Patient/family training;Bed mobility;Gait training;Spoke to nursing;Spoke to case management;OT   PT Frequency 2-3x/wk   Discharge Recommendation   Rehab Resource Intensity Level, PT II (Moderate Resource Intensity)   Equipment Recommended Walker   Walker Package Recommended Wheeled walker   Change/add to Walker Package? No   AM-PAC Basic Mobility Inpatient   Turning in Flat Bed Without Bedrails 3   Lying on Back to Sitting on Edge of Flat Bed Without Bedrails 3   Moving Bed to Chair 2   Standing Up From Chair Using Arms 2   Walk in Room 2   Climb 3-5 Stairs With Railing 2   Basic Mobility Inpatient Raw Score 14   Basic Mobility Standardized Score 35.55   [facility] Highest Level Of Mobility   -M Goal 4: Move to chair/commode   -HLM Achieved 5: Stand (1 or more minutes)   End of Consult   Patient Position at End of Consult Bedside chair;Bed/Chair alarm activated;All needs within reach         Kyra Ross, PT, DPT

## 2025-01-04 LAB
ANION GAP SERPL CALCULATED.3IONS-SCNC: 10 MMOL/L (ref 4–13)
BUN SERPL-MCNC: 15 MG/DL (ref 5–25)
CALCIUM SERPL-MCNC: 8.5 MG/DL (ref 8.4–10.2)
CHLORIDE SERPL-SCNC: 108 MMOL/L (ref 96–108)
CO2 SERPL-SCNC: 23 MMOL/L (ref 21–32)
CREAT SERPL-MCNC: 0.68 MG/DL (ref 0.6–1.3)
ERYTHROCYTE [DISTWIDTH] IN BLOOD BY AUTOMATED COUNT: 14.6 % (ref 11.6–15.1)
GFR SERPL CREATININE-BSD FRML MDRD: 90 ML/MIN/1.73SQ M
GLUCOSE SERPL-MCNC: 93 MG/DL (ref 65–140)
HCT VFR BLD AUTO: 27.8 % (ref 36.5–49.3)
HGB BLD-MCNC: 8.5 G/DL (ref 12–17)
MCH RBC QN AUTO: 29.3 PG (ref 26.8–34.3)
MCHC RBC AUTO-ENTMCNC: 30.6 G/DL (ref 31.4–37.4)
MCV RBC AUTO: 96 FL (ref 82–98)
PLATELET # BLD AUTO: 474 THOUSANDS/UL (ref 149–390)
PMV BLD AUTO: 10.1 FL (ref 8.9–12.7)
POTASSIUM SERPL-SCNC: 4 MMOL/L (ref 3.5–5.3)
RBC # BLD AUTO: 2.9 MILLION/UL (ref 3.88–5.62)
SODIUM SERPL-SCNC: 141 MMOL/L (ref 135–147)
WBC # BLD AUTO: 12.97 THOUSAND/UL (ref 4.31–10.16)

## 2025-01-04 PROCEDURE — 85027 COMPLETE CBC AUTOMATED: CPT

## 2025-01-04 PROCEDURE — 80048 BASIC METABOLIC PNL TOTAL CA: CPT

## 2025-01-04 PROCEDURE — 99233 SBSQ HOSP IP/OBS HIGH 50: CPT

## 2025-01-04 RX ORDER — OXYCODONE HYDROCHLORIDE 5 MG/1
5 TABLET ORAL EVERY 4 HOURS PRN
Refills: 0 | Status: DISCONTINUED | OUTPATIENT
Start: 2025-01-04 | End: 2025-01-24 | Stop reason: HOSPADM

## 2025-01-04 RX ORDER — SODIUM CHLORIDE 9 MG/ML
100 INJECTION, SOLUTION INTRAVENOUS CONTINUOUS
Status: DISPENSED | OUTPATIENT
Start: 2025-01-04 | End: 2025-01-05

## 2025-01-04 RX ORDER — OXYCODONE HYDROCHLORIDE 10 MG/1
10 TABLET ORAL EVERY 4 HOURS PRN
Refills: 0 | Status: DISCONTINUED | OUTPATIENT
Start: 2025-01-04 | End: 2025-01-24 | Stop reason: HOSPADM

## 2025-01-04 RX ORDER — HYDROMORPHONE HCL/PF 1 MG/ML
1 SYRINGE (ML) INJECTION
Status: DISCONTINUED | OUTPATIENT
Start: 2025-01-04 | End: 2025-01-07

## 2025-01-04 RX ADMIN — FUROSEMIDE 40 MG: 40 TABLET ORAL at 09:05

## 2025-01-04 RX ADMIN — PENTOXIFYLLINE 400 MG: 400 TABLET, EXTENDED RELEASE ORAL at 16:05

## 2025-01-04 RX ADMIN — PENTOXIFYLLINE 400 MG: 400 TABLET, EXTENDED RELEASE ORAL at 11:49

## 2025-01-04 RX ADMIN — EZETIMIBE 10 MG: 10 TABLET ORAL at 09:05

## 2025-01-04 RX ADMIN — PREDNISONE 5 MG: 5 TABLET ORAL at 16:04

## 2025-01-04 RX ADMIN — OXYCODONE HYDROCHLORIDE 5 MG: 5 TABLET ORAL at 00:03

## 2025-01-04 RX ADMIN — ASPIRIN 81 MG CHEWABLE TABLET 81 MG: 81 TABLET CHEWABLE at 09:05

## 2025-01-04 RX ADMIN — ENOXAPARIN SODIUM 40 MG: 40 INJECTION SUBCUTANEOUS at 09:09

## 2025-01-04 RX ADMIN — GABAPENTIN 600 MG: 300 CAPSULE ORAL at 18:20

## 2025-01-04 RX ADMIN — FERROUS SULFATE TAB 325 MG (65 MG ELEMENTAL FE) 325 MG: 325 (65 FE) TAB at 09:05

## 2025-01-04 RX ADMIN — ALLOPURINOL 100 MG: 100 TABLET ORAL at 09:05

## 2025-01-04 RX ADMIN — METHOCARBAMOL 500 MG: 500 TABLET ORAL at 09:05

## 2025-01-04 RX ADMIN — OXYCODONE HYDROCHLORIDE 10 MG: 10 TABLET ORAL at 18:39

## 2025-01-04 RX ADMIN — FAMOTIDINE 20 MG: 20 TABLET, FILM COATED ORAL at 09:05

## 2025-01-04 RX ADMIN — FINASTERIDE 5 MG: 5 TABLET, FILM COATED ORAL at 09:05

## 2025-01-04 RX ADMIN — PREDNISONE 5 MG: 5 TABLET ORAL at 09:05

## 2025-01-04 RX ADMIN — METOPROLOL SUCCINATE 25 MG: 25 TABLET, EXTENDED RELEASE ORAL at 09:05

## 2025-01-04 RX ADMIN — POTASSIUM CHLORIDE 20 MEQ: 1500 TABLET, EXTENDED RELEASE ORAL at 09:05

## 2025-01-04 RX ADMIN — ATORVASTATIN CALCIUM 80 MG: 80 TABLET, FILM COATED ORAL at 16:04

## 2025-01-04 RX ADMIN — OXYCODONE HYDROCHLORIDE 5 MG: 5 TABLET ORAL at 04:26

## 2025-01-04 RX ADMIN — HYDROMORPHONE HYDROCHLORIDE 0.2 MG: 0.2 INJECTION, SOLUTION INTRAMUSCULAR; INTRAVENOUS; SUBCUTANEOUS at 01:04

## 2025-01-04 RX ADMIN — SODIUM CHLORIDE 100 ML/HR: 0.9 INJECTION, SOLUTION INTRAVENOUS at 13:33

## 2025-01-04 RX ADMIN — OXYCODONE HYDROCHLORIDE 5 MG: 5 TABLET ORAL at 09:19

## 2025-01-04 RX ADMIN — TAMSULOSIN HYDROCHLORIDE 0.4 MG: 0.4 CAPSULE ORAL at 16:04

## 2025-01-04 RX ADMIN — EMPAGLIFLOZIN 10 MG: 10 TABLET, FILM COATED ORAL at 09:09

## 2025-01-04 RX ADMIN — GABAPENTIN 600 MG: 300 CAPSULE ORAL at 09:05

## 2025-01-04 RX ADMIN — CLOPIDOGREL BISULFATE 75 MG: 75 TABLET, FILM COATED ORAL at 09:05

## 2025-01-04 RX ADMIN — PENTOXIFYLLINE 400 MG: 400 TABLET, EXTENDED RELEASE ORAL at 09:08

## 2025-01-04 RX ADMIN — METHOCARBAMOL 500 MG: 500 TABLET ORAL at 16:04

## 2025-01-04 NOTE — PROGRESS NOTES
Progress Note - Hospitalist   Name: Royce Rene 79 y.o. male I MRN: [id number]  [facility] Date of Admission: 1/1/2025   Date of Service: 1/4/2025 I Hospital Day: 3     Assessment & Plan  PAD (peripheral artery disease) (HCC)    Right foot great toe gangrene with concern for underlying osteomyelitis  Severe peripheral vascular disease  Ulceration left foot midfoot lateral  History of extensive peripheral arterial disease  Podiatry and Vascular onboard  Continue aspirin, Plavix, atorvastatin, pentoxifylline  Transferred to [place] on 1/1/2025.  Discussed with podiatry and vascular.  Per vascular the CTA of the lower extremity shows very severe disease.  Not able to do limflow procedure.  Reached out to podiatry and they are also limited due to poor vasculature of the leg and are unable to do surgery.  Per vascular patient has severe SFA stent occlusion and distal left CFA and proximal left SFA occlusion  No more revasularization options  Palliative wound care advised  IR consuled for IR LLE angio Tuesday next week  Midnight previous  Hold am lovenox during that time  Ambulatory dysfunction  Baseline- close to wheelchair bounded- minimal walk.  Fall precautions  PT in [facility] recommended level II rehab.  Patient is amenable.   Will find placement after procedure  Chronic combined systolic and diastolic CHF (congestive heart failure) (HCC)  Wt Readings from Last 3 Encounters:   12/26/24 100 kg (220 lb 14.4 oz)   12/13/24 103 kg (227 lb)   12/12/24 103 kg (227 lb)   Home diuretic: PO Lasix 40mg QD \  Continue metoprolol, losartan, Jardiance  Low-salt diet  Monitor I/O, daily weights        Prostate cancer (HCC)  History of prostate cancer follows with oncology receiving chemotherapy (last on 12/12)  Follows with Dr. Hernandez  Current regimen is Cabazitaxel, Neulasta, prednisone, Lupron, denosumab  Continue to follow in outpatient setting  Diarrhea  Patient with episodes of diarrhea since receiving  "chemotherapy last week.  He also reports some nausea and vomiting.  Poor PO intake  Likely chemotherapy associated.  Improving  C. difficile and stool enteric panel negative  Supportive cares  Replete electrolytes  Imodium as needed  CAD (coronary artery disease)  Continue aspirin, metoprolol, atorvastatin  Urinary retention  Had to be straight cathed x 1  Started on Flomax and Finasteride with improvement in symptoms  Has been able to urinate on his own  Urinary retention protocol  Outpatient followup with Urology  Right ankle pain  Lab Results   Component Value Date    URICACID 11.5 (H) 12/26/2024       Patient reports right ankle pain  No obvious erythema swelling  X-ray reveals some sclerosis of calcaneus possible stress injury  CRP 79.1, uric acid 11.5.  Possible gout  Podiatry following, appreciate recommendations   Wounds do not appear to be acutely infected.  Agree with prednisone for acute gouty arthropathy.  No surgical plans  Supportive cares  Started on allopurinol  Ulcer of left foot, limited to breakdown of skin (HCC)  Pic under media.  Low suspicion for active infection  Per podiatry, no surgical plans      Hyponatremia  Recent Labs     01/02/25  0513 01/03/25  0453 01/04/25  0425   SODIUM 139 139 141     No results found for: \"OSMOUA\", \"NAUR\", \"OSMOLALITSER\"    Resolved    Iron deficiency anemia  Lab Results   Component Value Date    HGB 8.5 (L) 01/04/2025    HGB 8.5 (L) 01/03/2025    HGB 8.6 (L) 01/02/2025    HGB 8.9 (L) 01/01/2025    HGB 8.6 (L) 12/31/2024    CONCFE 10 (L) 12/28/2024    IRON 21 (L) 12/28/2024    FERRITIN 1,012 (H) 12/28/2024    TIBC 214.2 (L) 12/28/2024     Continue iron supplementation        VTE Pharmacologic Prophylaxis: VTE Score: 6 High Risk (Score >/= 5) - Pharmacological DVT Prophylaxis Ordered: enoxaparin (Lovenox). Sequential Compression Devices Ordered.    Mobility:   Basic Mobility Inpatient Raw Score: 9  -[facility] Goal: 3: Sit at edge of bed  -[facility] Achieved: 3: Sit at " edge of bed  JH-HLM Goal achieved. Continue to encourage appropriate mobility.    Patient Centered Rounds: I performed bedside rounds with nursing staff today.   Discussions with Specialists or Other Care Team Provider: Podiatry and vascular    Education and Discussions with Family / Patient: Updated  (son) via phone.    Current Length of Stay: 3 day(s)  Current Patient Status: Inpatient   Certification Statement: The patient, who was admitted as an inpatient, is being discharged sooner than originally anticipated due to ffoot ulcer  Discharge Plan: Anticipate discharge in 24-48 hrs to rehab facility.    Code Status: Level 1 - Full Code    Subjective   No complaints      Objective :  Temp:  [98.7 °F (37.1 °C)-99.1 °F (37.3 °C)] 99.1 °F (37.3 °C)  HR:  [86-93] 86  BP: (104-131)/(55-73) 131/73  Resp:  [16-19] 17  SpO2:  [93 %-94 %] 94 %  O2 Device: None (Room air)    There is no height or weight on file to calculate BMI.     Input and Output Summary (last 24 hours):     Intake/Output Summary (Last 24 hours) at 1/4/2025 1154  Last data filed at 1/4/2025 0401  Gross per 24 hour   Intake 720 ml   Output 300 ml   Net 420 ml       Physical Exam  Vitals and nursing note reviewed.   Constitutional:       Appearance: He is well-developed and normal weight.   HENT:      Head: Normocephalic and atraumatic.      Nose: Nose normal.      Mouth/Throat:      Mouth: Mucous membranes are moist.   Eyes:      Conjunctiva/sclera: Conjunctivae normal.   Cardiovascular:      Rate and Rhythm: Normal rate and regular rhythm.      Heart sounds: Normal heart sounds. No murmur heard.  Pulmonary:      Effort: Pulmonary effort is normal. No respiratory distress.      Breath sounds: Normal breath sounds.   Abdominal:      General: Abdomen is flat.      Palpations: Abdomen is soft.      Tenderness: There is no abdominal tenderness.   Musculoskeletal:         General: No swelling.      Cervical back: Neck supple.      Right lower leg:  No edema.      Left lower leg: No edema.   Skin:     General: Skin is warm and dry.      Capillary Refill: Capillary refill takes less than 2 seconds.   Neurological:      General: No focal deficit present.      Mental Status: He is alert and oriented to person, place, and time. Mental status is at baseline.   Psychiatric:         Mood and Affect: Mood normal.           Lines/Drains:  Lines/Drains/Airways       Active Status       Name Placement date Placement time Site Days    Urethral Catheter Straight-tip;Non-latex 16 Fr. 01/02/25  1611  Straight-tip;Non-latex  1                    Central Line:  Goal for removal: N/A - Chronic PICC               Lab Results: I have reviewed the following results:   Results from last 7 days   Lab Units 01/04/25  0425 01/03/25  0453 01/02/25  0513   WBC Thousand/uL 12.97*   < > 12.83*   HEMOGLOBIN g/dL 8.5*   < > 8.6*   HEMATOCRIT % 27.8*   < > 27.8*   PLATELETS Thousands/uL 474*   < > 419*   SEGS PCT %  --   --  82*   LYMPHO PCT %  --   --  7*   MONO PCT %  --   --  8   EOS PCT %  --   --  1    < > = values in this interval not displayed.     Results from last 7 days   Lab Units 01/04/25  0425   SODIUM mmol/L 141   POTASSIUM mmol/L 4.0   CHLORIDE mmol/L 108   CO2 mmol/L 23   BUN mg/dL 15   CREATININE mg/dL 0.68   ANION GAP mmol/L 10   CALCIUM mg/dL 8.5   GLUCOSE RANDOM mg/dL 93     Results from last 7 days   Lab Units 01/03/25  0453   INR  1.10     Results from last 7 days   Lab Units 01/03/25  1034 01/02/25  1033   POC GLUCOSE mg/dl 113 91               Recent Cultures (last 7 days):           Imaging Results Review: I personally reviewed the following image studies/reports in PACS and discussed pertinent findings with Radiology: ct. My interpretation of the radiology images/reports is: Severe PAD.  Other Study Results Review: EKG was reviewed.     Last 24 Hours Medication List:     Current Facility-Administered Medications:     acetaminophen (TYLENOL) tablet 650 mg, Q6H PRN     allopurinol (ZYLOPRIM) tablet 100 mg, Daily    aspirin chewable tablet 81 mg, Daily    atorvastatin (LIPITOR) tablet 80 mg, Daily With Dinner    bismuth subsalicylate (PEPTO BISMOL) oral suspension 524 mg, Q6H PRN    clopidogrel (PLAVIX) tablet 75 mg, Daily    Diclofenac Sodium (VOLTAREN) 1 % topical gel 2 g, 4x Daily    Empagliflozin (JARDIANCE) tablet 10 mg, QAM    enoxaparin (LOVENOX) subcutaneous injection 40 mg, Daily    ezetimibe (ZETIA) tablet 10 mg, Daily    famotidine (PEPCID) tablet 20 mg, Daily    ferrous sulfate tablet 325 mg, Daily    finasteride (PROSCAR) tablet 5 mg, Daily    furosemide (LASIX) tablet 40 mg, Daily    gabapentin (NEURONTIN) capsule 600 mg, BID    HYDROmorphone HCl (DILAUDID) injection 0.2 mg, Q4H PRN    loperamide (IMODIUM) capsule 2 mg, 4x Daily PRN    [Held by provider] losartan (COZAAR) tablet 12.5 mg, Daily    menthol-methyl salicylate (BENGAY) 10-15 % cream, 4x Daily PRN    methocarbamol (ROBAXIN) tablet 500 mg, TID    metoprolol succinate (TOPROL-XL) 24 hr tablet 25 mg, Daily    naloxone (NARCAN) 0.04 mg/mL syringe 0.04 mg, Q1MIN PRN    ondansetron (ZOFRAN) injection 4 mg, Q6H PRN    oxyCODONE (ROXICODONE) split tablet 2.5 mg, Q4H PRN **OR** oxyCODONE (ROXICODONE) IR tablet 5 mg, Q4H PRN    pentoxifylline (TRENtal) ER tablet 400 mg, TID With Meals    potassium chloride (Klor-Con M20) CR tablet 20 mEq, Daily    predniSONE tablet 5 mg, BID With Meals    tamsulosin (FLOMAX) capsule 0.4 mg, Daily With Dinner    Administrative Statements   Today, Patient Was Seen By: Tato Nixon MD  I have spent a total time of 60 minutes in caring for this patient on the day of the visit/encounter including Diagnostic results, Prognosis, Risks and benefits of tx options, Instructions for management, Patient and family education, Importance of tx compliance, Risk factor reductions, Impressions, Counseling / Coordination of care, Documenting in the medical record, Reviewing / ordering  tests, medicine, procedures  , Obtaining or reviewing history  , and Communicating with other healthcare professionals .    **Please Note: This note may have been constructed using a voice recognition system.**

## 2025-01-04 NOTE — ASSESSMENT & PLAN NOTE
Lab Results   Component Value Date    HGB 8.5 (L) 01/04/2025    HGB 8.5 (L) 01/03/2025    HGB 8.6 (L) 01/02/2025    HGB 8.9 (L) 01/01/2025    HGB 8.6 (L) 12/31/2024    CONCFE 10 (L) 12/28/2024    IRON 21 (L) 12/28/2024    FERRITIN 1,012 (H) 12/28/2024    TIBC 214.2 (L) 12/28/2024     Continue iron supplementation

## 2025-01-04 NOTE — ASSESSMENT & PLAN NOTE
Right foot great toe gangrene with concern for underlying osteomyelitis  Severe peripheral vascular disease  Ulceration left foot midfoot lateral  History of extensive peripheral arterial disease  Podiatry and Vascular onboard  Continue aspirin, Plavix, atorvastatin, pentoxifylline  Transferred to [place] on 1/1/2025.  Discussed with podiatry and vascular.  Per vascular the CTA of the lower extremity shows very severe disease.  Not able to do limflow procedure.  Reached out to podiatry and they are also limited due to poor vasculature of the leg and are unable to do surgery.  Per vascular patient has severe SFA stent occlusion and distal left CFA and proximal left SFA occlusion  No more revasularization options  Palliative wound care advised  IR consuled for IR MIKE angio Tuesday next week  Midnight previous  Hold am lovenox during that time

## 2025-01-04 NOTE — ASSESSMENT & PLAN NOTE
Baseline- close to wheelchair bounded- minimal walk.  Fall precautions  PT in [facility] recommended level II rehab.  Patient is amenable.   Will find placement after procedure

## 2025-01-04 NOTE — ASSESSMENT & PLAN NOTE
"Recent Labs     01/02/25  0513 01/03/25  0453 01/04/25  0425   SODIUM 139 139 141     No results found for: \"OSMOUA\", \"NAUR\", \"OSMOLALITSER\"    Resolved    "

## 2025-01-05 LAB
ATRIAL RATE: 84 BPM
P AXIS: 53 DEGREES
PR INTERVAL: 134 MS
QRS AXIS: 53 DEGREES
QRSD INTERVAL: 134 MS
QT INTERVAL: 396 MS
QTC INTERVAL: 469 MS
T WAVE AXIS: -80 DEGREES
VENTRICULAR RATE: 84 BPM

## 2025-01-05 PROCEDURE — 93010 ELECTROCARDIOGRAM REPORT: CPT | Performed by: INTERNAL MEDICINE

## 2025-01-05 PROCEDURE — 99233 SBSQ HOSP IP/OBS HIGH 50: CPT

## 2025-01-05 RX ADMIN — PENTOXIFYLLINE 400 MG: 400 TABLET, EXTENDED RELEASE ORAL at 08:16

## 2025-01-05 RX ADMIN — METHOCARBAMOL 500 MG: 500 TABLET ORAL at 21:29

## 2025-01-05 RX ADMIN — METHOCARBAMOL 500 MG: 500 TABLET ORAL at 08:14

## 2025-01-05 RX ADMIN — METOPROLOL SUCCINATE 25 MG: 25 TABLET, EXTENDED RELEASE ORAL at 08:14

## 2025-01-05 RX ADMIN — ALLOPURINOL 100 MG: 100 TABLET ORAL at 08:14

## 2025-01-05 RX ADMIN — SODIUM CHLORIDE 100 ML/HR: 0.9 INJECTION, SOLUTION INTRAVENOUS at 01:03

## 2025-01-05 RX ADMIN — CLOPIDOGREL BISULFATE 75 MG: 75 TABLET, FILM COATED ORAL at 08:14

## 2025-01-05 RX ADMIN — PREDNISONE 5 MG: 5 TABLET ORAL at 17:22

## 2025-01-05 RX ADMIN — ASPIRIN 81 MG CHEWABLE TABLET 81 MG: 81 TABLET CHEWABLE at 08:14

## 2025-01-05 RX ADMIN — ATORVASTATIN CALCIUM 80 MG: 80 TABLET, FILM COATED ORAL at 17:22

## 2025-01-05 RX ADMIN — PENTOXIFYLLINE 400 MG: 400 TABLET, EXTENDED RELEASE ORAL at 10:52

## 2025-01-05 RX ADMIN — TAMSULOSIN HYDROCHLORIDE 0.4 MG: 0.4 CAPSULE ORAL at 17:22

## 2025-01-05 RX ADMIN — METHOCARBAMOL 500 MG: 500 TABLET ORAL at 17:24

## 2025-01-05 RX ADMIN — OXYCODONE HYDROCHLORIDE 10 MG: 10 TABLET ORAL at 21:29

## 2025-01-05 RX ADMIN — OXYCODONE HYDROCHLORIDE 10 MG: 10 TABLET ORAL at 10:44

## 2025-01-05 RX ADMIN — EZETIMIBE 10 MG: 10 TABLET ORAL at 08:14

## 2025-01-05 RX ADMIN — PREDNISONE 5 MG: 5 TABLET ORAL at 08:14

## 2025-01-05 RX ADMIN — PENTOXIFYLLINE 400 MG: 400 TABLET, EXTENDED RELEASE ORAL at 17:24

## 2025-01-05 RX ADMIN — GABAPENTIN 600 MG: 300 CAPSULE ORAL at 08:14

## 2025-01-05 RX ADMIN — HYDROMORPHONE HYDROCHLORIDE 1 MG: 1 INJECTION, SOLUTION INTRAMUSCULAR; INTRAVENOUS; SUBCUTANEOUS at 08:13

## 2025-01-05 RX ADMIN — FERROUS SULFATE TAB 325 MG (65 MG ELEMENTAL FE) 325 MG: 325 (65 FE) TAB at 08:14

## 2025-01-05 RX ADMIN — GABAPENTIN 600 MG: 300 CAPSULE ORAL at 17:22

## 2025-01-05 RX ADMIN — FAMOTIDINE 20 MG: 20 TABLET, FILM COATED ORAL at 08:14

## 2025-01-05 RX ADMIN — FINASTERIDE 5 MG: 5 TABLET, FILM COATED ORAL at 08:14

## 2025-01-05 RX ADMIN — POTASSIUM CHLORIDE 20 MEQ: 1500 TABLET, EXTENDED RELEASE ORAL at 08:14

## 2025-01-05 RX ADMIN — OXYCODONE HYDROCHLORIDE 10 MG: 10 TABLET ORAL at 04:58

## 2025-01-05 RX ADMIN — ENOXAPARIN SODIUM 40 MG: 40 INJECTION SUBCUTANEOUS at 08:14

## 2025-01-05 NOTE — ASSESSMENT & PLAN NOTE
"Recent Labs     01/03/25  0453 01/04/25  0425   SODIUM 139 141     No results found for: \"OSMOUA\", \"NAUR\", \"OSMOLALITSER\"    Resolved    "

## 2025-01-05 NOTE — ASSESSMENT & PLAN NOTE
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  Discussed with podiatry and vascular.  Per vascular the CTA of the lower extremity shows very severe disease.  Not able to do limflow procedure.  Reached out to podiatry and they are also limited due to poor vasculature of the leg and are unable to do surgery.  Per vascular patient has severe SFA stent occlusion and distal left CFA and proximal left SFA occlusion  No more revasularization options  Palliative wound care advised  IR consuled for IR LLE angio Tuesday next week  Midnight previous  Hold am lovenox during that time  Pain meds increased on 1/4/2025. Better today

## 2025-01-05 NOTE — ASSESSMENT & PLAN NOTE
Baseline- close to wheelchair bounded- minimal walk.  Fall precautions  PT in [facility] recommended level II rehab.  Patient is amenable.   Will find placement after procedure

## 2025-01-05 NOTE — PROGRESS NOTES
Progress Note - Hospitalist   Name: Royce Rene 79 y.o. male I MRN: [id number]  Unit/Bed#: [facility] 523-01 I Date of Admission: 1/1/2025   Date of Service: 1/5/2025 I Hospital Day: 4     Assessment & Plan  PAD (peripheral artery disease) (HCC)    Right foot great toe gangrene with concern for underlying osteomyelitis  Severe peripheral vascular disease  Ulceration left foot midfoot lateral  History of extensive peripheral arterial disease  Podiatry and Vascular onboard  Continue aspirin, Plavix, atorvastatin, pentoxifylline  Transferred to [place] on 1/1/2025.  Discussed with podiatry and vascular.  Per vascular the CTA of the lower extremity shows very severe disease.  Not able to do limflow procedure.  Reached out to podiatry and they are also limited due to poor vasculature of the leg and are unable to do surgery.  Per vascular patient has severe SFA stent occlusion and distal left CFA and proximal left SFA occlusion  No more revasularization options  Palliative wound care advised  IR consuled for IR LLE angio Tuesday next week  Midnight previous  Hold am lovenox during that time  Pain meds increased on 1/4/2025. Better today  Ambulatory dysfunction  Baseline- close to wheelchair bounded- minimal walk.  Fall precautions  PT in [facility] recommended level II rehab.  Patient is amenable.   Will find placement after procedure  Chronic combined systolic and diastolic CHF (congestive heart failure) (HCC)  Wt Readings from Last 3 Encounters:   12/26/24 100 kg (220 lb 14.4 oz)   12/13/24 103 kg (227 lb)   12/12/24 103 kg (227 lb)   Home diuretic: PO Lasix 40mg QD \  Continue metoprolol, losartan, Jardiance  Low-salt diet  Monitor I/O, daily weights        Prostate cancer (HCC)  History of prostate cancer follows with oncology receiving chemotherapy (last on 12/12)  Follows with Dr. Hernandez  Current regimen is Cabazitaxel, Neulasta, prednisone, Lupron, denosumab  Continue to follow in outpatient  "setting  Diarrhea  Patient with episodes of diarrhea since receiving chemotherapy last week.  He also reports some nausea and vomiting.  Poor PO intake  Likely chemotherapy associated.  Improving  C. difficile and stool enteric panel negative  Supportive cares  Replete electrolytes  Imodium as needed  CAD (coronary artery disease)  Continue aspirin, metoprolol, atorvastatin  Urinary retention  Had to be straight cathed x 1  Started on Flomax and Finasteride with improvement in symptoms  Has been able to urinate on his own  Urinary retention protocol  Outpatient followup with Urology  Right ankle pain  Lab Results   Component Value Date    URICACID 11.5 (H) 12/26/2024       Patient reports right ankle pain  No obvious erythema swelling  X-ray reveals some sclerosis of calcaneus possible stress injury  CRP 79.1, uric acid 11.5.  Possible gout  Podiatry following, appreciate recommendations   Wounds do not appear to be acutely infected.  Agree with prednisone for acute gouty arthropathy.  No surgical plans  Supportive cares  Started on allopurinol  Ulcer of left foot, limited to breakdown of skin (HCC)  Pic under media.  Low suspicion for active infection  Per podiatry, no surgical plans      Hyponatremia  Recent Labs     01/03/25  0453 01/04/25  0425   SODIUM 139 141     No results found for: \"OSMOUA\", \"NAUR\", \"OSMOLALITSER\"    Resolved    Iron deficiency anemia  Lab Results   Component Value Date    HGB 8.5 (L) 01/04/2025    HGB 8.5 (L) 01/03/2025    HGB 8.6 (L) 01/02/2025    HGB 8.9 (L) 01/01/2025    HGB 8.6 (L) 12/31/2024    CONCFE 10 (L) 12/28/2024    IRON 21 (L) 12/28/2024    FERRITIN 1,012 (H) 12/28/2024    TIBC 214.2 (L) 12/28/2024     Continue iron supplementation        VTE Pharmacologic Prophylaxis: VTE Score: 6 High Risk (Score >/= 5) - Pharmacological DVT Prophylaxis Ordered: enoxaparin (Lovenox). Sequential Compression Devices Ordered.    Mobility:   Basic Mobility Inpatient Raw Score: 9  [facility] Goal: 3: " Sit at edge of bed  JH-HLM Achieved: 6: Walk 10 steps or more  JH-HLM Goal achieved. Continue to encourage appropriate mobility.    Patient Centered Rounds: I performed bedside rounds with nursing staff today.   Discussions with Specialists or Other Care Team Provider: Podiatry and vascular    Education and Discussions with Family / Patient: Updated  (son) via phone.    Current Length of Stay: 4 day(s)  Current Patient Status: Inpatient   Certification Statement: The patient, who was admitted as an inpatient, is being discharged sooner than originally anticipated due to ffoot ulcer  Discharge Plan: Anticipate discharge in 24-48 hrs to rehab facility.    Code Status: Level 1 - Full Code    Subjective   No complaints      Objective :  Temp:  [97.8 °F (36.6 °C)-99.5 °F (37.5 °C)] 97.8 °F (36.6 °C)  HR:  [83-99] 99  BP: ()/(59-85) 117/85  Resp:  [16-18] 18  SpO2:  [91 %-95 %] 94 %  O2 Device: None (Room air)    There is no height or weight on file to calculate BMI.     Input and Output Summary (last 24 hours):     Intake/Output Summary (Last 24 hours) at 1/5/2025 1146  Last data filed at 1/5/2025 1133  Gross per 24 hour   Intake 2490 ml   Output 2650 ml   Net -160 ml       Physical Exam  Vitals and nursing note reviewed.   Constitutional:       Appearance: He is well-developed and normal weight.   HENT:      Head: Normocephalic and atraumatic.      Nose: Nose normal.      Mouth/Throat:      Mouth: Mucous membranes are moist.   Eyes:      Conjunctiva/sclera: Conjunctivae normal.   Cardiovascular:      Rate and Rhythm: Normal rate and regular rhythm.      Heart sounds: Normal heart sounds. No murmur heard.  Pulmonary:      Effort: Pulmonary effort is normal. No respiratory distress.      Breath sounds: Normal breath sounds.   Abdominal:      General: Abdomen is flat.      Palpations: Abdomen is soft.      Tenderness: There is no abdominal tenderness.   Musculoskeletal:         General: No swelling.       Cervical back: Neck supple.      Right lower leg: No edema.      Left lower leg: No edema.   Skin:     General: Skin is warm and dry.      Capillary Refill: Capillary refill takes less than 2 seconds.   Neurological:      General: No focal deficit present.      Mental Status: He is alert and oriented to person, place, and time. Mental status is at baseline.   Psychiatric:         Mood and Affect: Mood normal.           Lines/Drains:  Lines/Drains/Airways       Active Status       Name Placement date Placement time Site Days    Urethral Catheter Straight-tip;Non-latex 16 Fr. 01/02/25  1611  Straight-tip;Non-latex  2                    Central Line:  Goal for removal: N/A - Chronic PICC               Lab Results: I have reviewed the following results:   Results from last 7 days   Lab Units 01/04/25  0425 01/03/25  0453 01/02/25  0513   WBC Thousand/uL 12.97*   < > 12.83*   HEMOGLOBIN g/dL 8.5*   < > 8.6*   HEMATOCRIT % 27.8*   < > 27.8*   PLATELETS Thousands/uL 474*   < > 419*   SEGS PCT %  --   --  82*   LYMPHO PCT %  --   --  7*   MONO PCT %  --   --  8   EOS PCT %  --   --  1    < > = values in this interval not displayed.     Results from last 7 days   Lab Units 01/04/25  0425   SODIUM mmol/L 141   POTASSIUM mmol/L 4.0   CHLORIDE mmol/L 108   CO2 mmol/L 23   BUN mg/dL 15   CREATININE mg/dL 0.68   ANION GAP mmol/L 10   CALCIUM mg/dL 8.5   GLUCOSE RANDOM mg/dL 93     Results from last 7 days   Lab Units 01/03/25  0453   INR  1.10     Results from last 7 days   Lab Units 01/03/25  1034 01/02/25  1033   POC GLUCOSE mg/dl 113 91               Recent Cultures (last 7 days):           Imaging Results Review: I personally reviewed the following image studies/reports in PACS and discussed pertinent findings with Radiology: ct. My interpretation of the radiology images/reports is: Severe PAD.  Other Study Results Review: EKG was reviewed.     Last 24 Hours Medication List:     Current Facility-Administered Medications:      acetaminophen (TYLENOL) tablet 650 mg, Q6H PRN    allopurinol (ZYLOPRIM) tablet 100 mg, Daily    aspirin chewable tablet 81 mg, Daily    atorvastatin (LIPITOR) tablet 80 mg, Daily With Dinner    bismuth subsalicylate (PEPTO BISMOL) oral suspension 524 mg, Q6H PRN    clopidogrel (PLAVIX) tablet 75 mg, Daily    Diclofenac Sodium (VOLTAREN) 1 % topical gel 2 g, 4x Daily    [Held by provider] Empagliflozin (JARDIANCE) tablet 10 mg, QAM    enoxaparin (LOVENOX) subcutaneous injection 40 mg, Daily    ezetimibe (ZETIA) tablet 10 mg, Daily    famotidine (PEPCID) tablet 20 mg, Daily    ferrous sulfate tablet 325 mg, Daily    finasteride (PROSCAR) tablet 5 mg, Daily    [Held by provider] furosemide (LASIX) tablet 40 mg, Daily    gabapentin (NEURONTIN) capsule 600 mg, BID    HYDROmorphone (DILAUDID) injection 1 mg, Q3H PRN    loperamide (IMODIUM) capsule 2 mg, 4x Daily PRN    [Held by provider] losartan (COZAAR) tablet 12.5 mg, Daily    menthol-methyl salicylate (BENGAY) 10-15 % cream, 4x Daily PRN    methocarbamol (ROBAXIN) tablet 500 mg, TID    metoprolol succinate (TOPROL-XL) 24 hr tablet 25 mg, Daily    naloxone (NARCAN) 0.04 mg/mL syringe 0.04 mg, Q1MIN PRN    ondansetron (ZOFRAN) injection 4 mg, Q6H PRN    oxyCODONE (ROXICODONE) IR tablet 5 mg, Q4H PRN **OR** oxyCODONE (ROXICODONE) immediate release tablet 10 mg, Q4H PRN    pentoxifylline (TRENtal) ER tablet 400 mg, TID With Meals    potassium chloride (Klor-Con M20) CR tablet 20 mEq, Daily    predniSONE tablet 5 mg, BID With Meals    sodium chloride 0.9 % infusion, Continuous, Last Rate: 100 mL/hr (01/05/25 0103)    tamsulosin (FLOMAX) capsule 0.4 mg, Daily With Dinner    Administrative Statements   Today, Patient Was Seen By: Tato Nixon MD  I have spent a total time of 60 minutes in caring for this patient on the day of the visit/encounter including Diagnostic results, Prognosis, Risks and benefits of tx options, Instructions for management, Patient and  family education, Importance of tx compliance, Risk factor reductions, Impressions, Counseling / Coordination of care, Documenting in the medical record, Reviewing / ordering tests, medicine, procedures  , Obtaining or reviewing history  , and Communicating with other healthcare professionals .    **Please Note: This note may have been constructed using a voice recognition system.**

## 2025-01-06 PROCEDURE — 99233 SBSQ HOSP IP/OBS HIGH 50: CPT

## 2025-01-06 PROCEDURE — 99232 SBSQ HOSP IP/OBS MODERATE 35: CPT

## 2025-01-06 RX ADMIN — ENOXAPARIN SODIUM 40 MG: 40 INJECTION SUBCUTANEOUS at 09:32

## 2025-01-06 RX ADMIN — PENTOXIFYLLINE 400 MG: 400 TABLET, EXTENDED RELEASE ORAL at 17:43

## 2025-01-06 RX ADMIN — EZETIMIBE 10 MG: 10 TABLET ORAL at 09:31

## 2025-01-06 RX ADMIN — PREDNISONE 5 MG: 5 TABLET ORAL at 06:31

## 2025-01-06 RX ADMIN — CLOPIDOGREL BISULFATE 75 MG: 75 TABLET, FILM COATED ORAL at 09:31

## 2025-01-06 RX ADMIN — TAMSULOSIN HYDROCHLORIDE 0.4 MG: 0.4 CAPSULE ORAL at 17:27

## 2025-01-06 RX ADMIN — GABAPENTIN 600 MG: 300 CAPSULE ORAL at 17:27

## 2025-01-06 RX ADMIN — PENTOXIFYLLINE 400 MG: 400 TABLET, EXTENDED RELEASE ORAL at 11:38

## 2025-01-06 RX ADMIN — GABAPENTIN 600 MG: 300 CAPSULE ORAL at 09:31

## 2025-01-06 RX ADMIN — PENTOXIFYLLINE 400 MG: 400 TABLET, EXTENDED RELEASE ORAL at 06:31

## 2025-01-06 RX ADMIN — METHOCARBAMOL 500 MG: 500 TABLET ORAL at 09:31

## 2025-01-06 RX ADMIN — POTASSIUM CHLORIDE 20 MEQ: 1500 TABLET, EXTENDED RELEASE ORAL at 09:31

## 2025-01-06 RX ADMIN — FERROUS SULFATE TAB 325 MG (65 MG ELEMENTAL FE) 325 MG: 325 (65 FE) TAB at 09:31

## 2025-01-06 RX ADMIN — OXYCODONE HYDROCHLORIDE 10 MG: 10 TABLET ORAL at 17:27

## 2025-01-06 RX ADMIN — METHOCARBAMOL 500 MG: 500 TABLET ORAL at 20:42

## 2025-01-06 RX ADMIN — HYDROMORPHONE HYDROCHLORIDE 1 MG: 1 INJECTION, SOLUTION INTRAMUSCULAR; INTRAVENOUS; SUBCUTANEOUS at 20:42

## 2025-01-06 RX ADMIN — PREDNISONE 5 MG: 5 TABLET ORAL at 17:27

## 2025-01-06 RX ADMIN — METOPROLOL SUCCINATE 25 MG: 25 TABLET, EXTENDED RELEASE ORAL at 09:31

## 2025-01-06 RX ADMIN — ATORVASTATIN CALCIUM 80 MG: 80 TABLET, FILM COATED ORAL at 17:27

## 2025-01-06 RX ADMIN — ALLOPURINOL 100 MG: 100 TABLET ORAL at 09:31

## 2025-01-06 RX ADMIN — FAMOTIDINE 20 MG: 20 TABLET, FILM COATED ORAL at 09:32

## 2025-01-06 RX ADMIN — METHOCARBAMOL 500 MG: 500 TABLET ORAL at 17:43

## 2025-01-06 RX ADMIN — FINASTERIDE 5 MG: 5 TABLET, FILM COATED ORAL at 09:31

## 2025-01-06 RX ADMIN — ASPIRIN 81 MG CHEWABLE TABLET 81 MG: 81 TABLET CHEWABLE at 09:32

## 2025-01-06 NOTE — PROGRESS NOTES
Progress Note - Hospitalist   Name: Royce Rene 79 y.o. male I MRN: [id number]  [facility] I Date of Admission: 1/1/2025   Date of Service: 1/6/2025 I Hospital Day: 5     Assessment & Plan  PAD (peripheral artery disease) (HCC)    Right foot great toe gangrene with concern for underlying osteomyelitis  Severe peripheral vascular disease  Ulceration left foot midfoot lateral  History of extensive peripheral arterial disease  Podiatry and Vascular onboard  Continue aspirin, Plavix, atorvastatin, pentoxifylline  Transferred to [place] on 1/1/2025.  Discussed with podiatry and vascular.  Per vascular the CTA of the lower extremity shows very severe disease.  Not able to do limflow procedure.  Reached out to podiatry and they are also limited due to poor vasculature of the leg and are unable to do surgery.  Per vascular patient has severe SFA stent occlusion and distal left CFA and proximal left SFA occlusion  No more revasularization options  Palliative wound care advised  IR consuled for IR LLE angio Tuesday next week  Midnight previous  Hold am lovenox during that time  Pain meds increased on 1/4/2025. Better today  Patient is medically clear for surgery.  Has higher risk due to cardiac history of coronary stenting and HFrEF.  No ever patient currently has no chest pain and euvolemic.  Heart failure and CAD are well-controlled.  At this time  Ambulatory dysfunction  Baseline- close to wheelchair bounded- minimal walk.  Fall precautions  PT in [facility] recommended level II rehab.  Patient is amenable.   Will find placement after procedure  Chronic combined systolic and diastolic CHF (congestive heart failure) (HCC)  Wt Readings from Last 3 Encounters:   12/26/24 100 kg (220 lb 14.4 oz)   12/13/24 103 kg (227 lb)   12/12/24 103 kg (227 lb)   Home diuretic: PO Lasix 40mg QD \  Continue metoprolol, losartan, Jardiance  Low-salt diet  Monitor I/O, daily weights        Prostate cancer (HCC)  History of  "prostate cancer follows with oncology receiving chemotherapy (last on 12/12)  Follows with Dr. Hernandez  Current regimen is Cabazitaxel, Neulasta, prednisone, Lupron, denosumab  Continue to follow in outpatient setting  Diarrhea  Patient with episodes of diarrhea since receiving chemotherapy last week.  He also reports some nausea and vomiting.  Poor PO intake  Likely chemotherapy associated.  Improving  C. difficile and stool enteric panel negative  Supportive cares  Replete electrolytes  Imodium as needed  CAD (coronary artery disease)  Continue aspirin, metoprolol, atorvastatin  Urinary retention  Had to be straight cathed x 1  Started on Flomax and Finasteride with improvement in symptoms  Has been able to urinate on his own  Urinary retention protocol  Outpatient followup with Urology  Right ankle pain  Lab Results   Component Value Date    URICACID 11.5 (H) 12/26/2024       Patient reports right ankle pain  No obvious erythema swelling  X-ray reveals some sclerosis of calcaneus possible stress injury  CRP 79.1, uric acid 11.5.  Possible gout  Podiatry following, appreciate recommendations   Wounds do not appear to be acutely infected.  Agree with prednisone for acute gouty arthropathy.  No surgical plans  Supportive cares  Started on allopurinol  Ulcer of left foot, limited to breakdown of skin (HCC)  Pic under media.  Low suspicion for active infection  Per podiatry, no surgical plans      Hyponatremia  Recent Labs     01/04/25  0425   SODIUM 141     No results found for: \"OSMOUA\", \"NAUR\", \"OSMOLALITSER\"    Resolved    Iron deficiency anemia  Lab Results   Component Value Date    HGB 8.5 (L) 01/04/2025    HGB 8.5 (L) 01/03/2025    HGB 8.6 (L) 01/02/2025    HGB 8.9 (L) 01/01/2025    HGB 8.6 (L) 12/31/2024    CONCFE 10 (L) 12/28/2024    IRON 21 (L) 12/28/2024    FERRITIN 1,012 (H) 12/28/2024    TIBC 214.2 (L) 12/28/2024     Continue iron supplementation        VTE Pharmacologic Prophylaxis: VTE Score: 6 High Risk " (Score >/= 5) - Pharmacological DVT Prophylaxis Ordered: enoxaparin (Lovenox). Sequential Compression Devices Ordered.    Mobility:   Basic Mobility Inpatient Raw Score: 9  JH-HLM Goal: 3: Sit at edge of bed  JH-HLM Achieved: 4: Move to chair/commode  JH-HLM Goal achieved. Continue to encourage appropriate mobility.    Patient Centered Rounds: I performed bedside rounds with nursing staff today.   Discussions with Specialists or Other Care Team Provider: Podiatry and vascular    Education and Discussions with Family / Patient: Updated  (son) via phone.    Current Length of Stay: 5 day(s)  Current Patient Status: Inpatient   Certification Statement: The patient, who was admitted as an inpatient, is being discharged sooner than originally anticipated due to ffoot ulcer  Discharge Plan: Anticipate discharge in 24-48 hrs to rehab facility.    Code Status: Level 1 - Full Code    Subjective   No complaints      Objective :  Temp:  [97.3 °F (36.3 °C)-98.4 °F (36.9 °C)] 98.2 °F (36.8 °C)  HR:  [83-89] 89  BP: (104-120)/(52-85) 110/53  Resp:  [15-18] 18  SpO2:  [93 %-94 %] 93 %  O2 Device: None (Room air)    There is no height or weight on file to calculate BMI.     Input and Output Summary (last 24 hours):     Intake/Output Summary (Last 24 hours) at 1/6/2025 1202  Last data filed at 1/6/2025 1144  Gross per 24 hour   Intake 1200 ml   Output 1200 ml   Net 0 ml       Physical Exam  Vitals and nursing note reviewed.   Constitutional:       Appearance: He is well-developed and normal weight.   HENT:      Head: Normocephalic and atraumatic.      Nose: Nose normal.      Mouth/Throat:      Mouth: Mucous membranes are moist.   Eyes:      Conjunctiva/sclera: Conjunctivae normal.   Cardiovascular:      Rate and Rhythm: Normal rate and regular rhythm.      Heart sounds: Normal heart sounds. No murmur heard.  Pulmonary:      Effort: Pulmonary effort is normal. No respiratory distress.      Breath sounds: Normal breath  sounds.   Abdominal:      General: Abdomen is flat.      Palpations: Abdomen is soft.      Tenderness: There is no abdominal tenderness.   Musculoskeletal:         General: No swelling.      Cervical back: Neck supple.      Right lower leg: No edema.      Left lower leg: No edema.   Skin:     General: Skin is warm and dry.      Capillary Refill: Capillary refill takes less than 2 seconds.   Neurological:      General: No focal deficit present.      Mental Status: He is alert and oriented to person, place, and time. Mental status is at baseline.   Psychiatric:         Mood and Affect: Mood normal.           Lines/Drains:  Lines/Drains/Airways       Active Status       Name Placement date Placement time Site Days    Urethral Catheter Straight-tip;Non-latex 16 Fr. 01/02/25  1611  Straight-tip;Non-latex  3                    Central Line:  Goal for removal: N/A - Chronic PICC               Lab Results: I have reviewed the following results:   Results from last 7 days   Lab Units 01/04/25  0425 01/03/25  0453 01/02/25  0513   WBC Thousand/uL 12.97*   < > 12.83*   HEMOGLOBIN g/dL 8.5*   < > 8.6*   HEMATOCRIT % 27.8*   < > 27.8*   PLATELETS Thousands/uL 474*   < > 419*   SEGS PCT %  --   --  82*   LYMPHO PCT %  --   --  7*   MONO PCT %  --   --  8   EOS PCT %  --   --  1    < > = values in this interval not displayed.     Results from last 7 days   Lab Units 01/04/25  0425   SODIUM mmol/L 141   POTASSIUM mmol/L 4.0   CHLORIDE mmol/L 108   CO2 mmol/L 23   BUN mg/dL 15   CREATININE mg/dL 0.68   ANION GAP mmol/L 10   CALCIUM mg/dL 8.5   GLUCOSE RANDOM mg/dL 93     Results from last 7 days   Lab Units 01/03/25  0453   INR  1.10     Results from last 7 days   Lab Units 01/03/25  1034 01/02/25  1033   POC GLUCOSE mg/dl 113 91               Recent Cultures (last 7 days):           Imaging Results Review: I personally reviewed the following image studies/reports in PACS and discussed pertinent findings with Radiology: ct. My  interpretation of the radiology images/reports is: Severe PAD.  Other Study Results Review: EKG was reviewed.     Last 24 Hours Medication List:     Current Facility-Administered Medications:     acetaminophen (TYLENOL) tablet 650 mg, Q6H PRN    allopurinol (ZYLOPRIM) tablet 100 mg, Daily    aspirin chewable tablet 81 mg, Daily    atorvastatin (LIPITOR) tablet 80 mg, Daily With Dinner    bismuth subsalicylate (PEPTO BISMOL) oral suspension 524 mg, Q6H PRN    clopidogrel (PLAVIX) tablet 75 mg, Daily    Diclofenac Sodium (VOLTAREN) 1 % topical gel 2 g, 4x Daily    [Held by provider] Empagliflozin (JARDIANCE) tablet 10 mg, QAM    enoxaparin (LOVENOX) subcutaneous injection 40 mg, Daily    ezetimibe (ZETIA) tablet 10 mg, Daily    famotidine (PEPCID) tablet 20 mg, Daily    ferrous sulfate tablet 325 mg, Daily    finasteride (PROSCAR) tablet 5 mg, Daily    [Held by provider] furosemide (LASIX) tablet 40 mg, Daily    gabapentin (NEURONTIN) capsule 600 mg, BID    HYDROmorphone (DILAUDID) injection 1 mg, Q3H PRN    loperamide (IMODIUM) capsule 2 mg, 4x Daily PRN    [Held by provider] losartan (COZAAR) tablet 12.5 mg, Daily    menthol-methyl salicylate (BENGAY) 10-15 % cream, 4x Daily PRN    methocarbamol (ROBAXIN) tablet 500 mg, TID    metoprolol succinate (TOPROL-XL) 24 hr tablet 25 mg, Daily    naloxone (NARCAN) 0.04 mg/mL syringe 0.04 mg, Q1MIN PRN    ondansetron (ZOFRAN) injection 4 mg, Q6H PRN    oxyCODONE (ROXICODONE) IR tablet 5 mg, Q4H PRN **OR** oxyCODONE (ROXICODONE) immediate release tablet 10 mg, Q4H PRN    pentoxifylline (TRENtal) ER tablet 400 mg, TID With Meals    potassium chloride (Klor-Con M20) CR tablet 20 mEq, Daily    predniSONE tablet 5 mg, BID With Meals    tamsulosin (FLOMAX) capsule 0.4 mg, Daily With Dinner    Administrative Statements   Today, Patient Was Seen By: Tato Nixon MD  I have spent a total time of 60 minutes in caring for this patient on the day of the visit/encounter  including Diagnostic results, Prognosis, Risks and benefits of tx options, Instructions for management, Patient and family education, Importance of tx compliance, Risk factor reductions, Impressions, Counseling / Coordination of care, Documenting in the medical record, Reviewing / ordering tests, medicine, procedures  , Obtaining or reviewing history  , and Communicating with other healthcare professionals .    **Please Note: This note may have been constructed using a voice recognition system.**

## 2025-01-06 NOTE — ASSESSMENT & PLAN NOTE
"Recent Labs     01/04/25  0425   SODIUM 141     No results found for: \"OSMOUA\", \"NAUR\", \"OSMOLALITSER\"    Resolved    "

## 2025-01-06 NOTE — PROGRESS NOTES
Progress Note    Subjective:  Vascular was secure chatted from RN due to concerns of worsening appearance of R foot.  Pt denies any worsening pain in bilateral feet, but reports that he has had prior paraesthesias after a heart catheterization through his R leg a couple years ago.  Pt denies recent ambulation or worsening rest pain, he states he has severe pain in his ankles when standing.  Pt understands to make staff aware of worsening pain in R foot, as he is scheduled for LE a-gram on 1/7, and understands his options for palliative care vs. amp for R leg. Denies chest pain, SOB, rest pain, nausea.     Objective:  Pulses:  R - +monophasic signal in PT, no signal in AT,  L- no signals in AT or PT  Popliteal - R + monophasic signal  Neuro- decreased sensation/motor function in R foot, intact with L foot.   Musc- R foot edematous, with worsening toes wounds, which have spread to include all toes, with serous fluid drainage/necrosis. L foot with CDI bandages, limited ROM.  Exam consistent with findings of media in chart, taken by RN on 1/6.        Assessment/Plan:  --Pt with known bilateral foot wounds and worsening R toe wounds compared to findings on 1/1, with chronic PAD and re-occlusion of L SFA   --No revascularization options/planned intervention for R leg  --No changes in pt's pain, remains afebrile  --Trend WBC   --Monitor for pain control or signs of systemic infection  --Continue with scheduled LE a-gram tomorrow  --Continue palliative care with bilateral feet  --Discussed with pt to alert staff of any changes to pain or sensation on R leg  --Follow-up with pt for future palliative care vs. amputation discussion      Kyle Paulino PA-C  01/06/2025

## 2025-01-06 NOTE — ASSESSMENT & PLAN NOTE
Right foot great toe gangrene with concern for underlying osteomyelitis  Severe peripheral vascular disease  Ulceration left foot midfoot lateral  History of extensive peripheral arterial disease  Podiatry and Vascular onboard  Continue aspirin, Plavix, atorvastatin, pentoxifylline  Transferred to [place] on 1/1/2025.  Discussed with podiatry and vascular.  Per vascular the CTA of the lower extremity shows very severe disease.  Not able to do limflow procedure.  Reached out to podiatry and they are also limited due to poor vasculature of the leg and are unable to do surgery.  Per vascular patient has severe SFA stent occlusion and distal left CFA and proximal left SFA occlusion  No more revasularization options  Palliative wound care advised  IR consuled for IR LLE angio Tuesday next week  Midnight previous  Hold am lovenox during that time  Pain meds increased on 1/4/2025. Better today  Patient is medically clear for surgery.  Has higher risk due to cardiac history of coronary stenting and HFrEF.  No ever patient currently has no chest pain and euvolemic.  Heart failure and CAD are well-controlled.  At this time

## 2025-01-06 NOTE — ASSESSMENT & PLAN NOTE
Baseline- close to wheelchair bounded- minimal walk.  Fall precautions  PT in [facility] recommended level II rehab.  Patient is amenable.   Will find placement after procedure

## 2025-01-07 ENCOUNTER — APPOINTMENT (INPATIENT)
Dept: RADIOLOGY | Facility: HOSPITAL | Age: 80
DRG: 239 | End: 2025-01-07
Payer: COMMERCIAL

## 2025-01-07 ENCOUNTER — ANESTHESIA EVENT (INPATIENT)
Dept: RADIOLOGY | Facility: HOSPITAL | Age: 80
End: 2025-01-07
Payer: COMMERCIAL

## 2025-01-07 ENCOUNTER — ANESTHESIA (INPATIENT)
Dept: RADIOLOGY | Facility: HOSPITAL | Age: 80
End: 2025-01-07
Payer: COMMERCIAL

## 2025-01-07 ENCOUNTER — ANESTHESIA EVENT (INPATIENT)
Dept: PERIOP | Facility: HOSPITAL | Age: 80
End: 2025-01-07
Payer: COMMERCIAL

## 2025-01-07 PROBLEM — Z95.2 HISTORY OF TRANSCATHETER AORTIC VALVE REPLACEMENT (TAVR): Chronic | Status: ACTIVE | Noted: 2025-01-07

## 2025-01-07 PROBLEM — Z01.810 ENCOUNTER FOR PREOPERATIVE ASSESSMENT FOR NONCORONARY CARDIAC SURGERY: Status: ACTIVE | Noted: 2025-01-07

## 2025-01-07 LAB
ABO GROUP BLD: NORMAL
ALBUMIN SERPL BCG-MCNC: 3.2 G/DL (ref 3.5–5)
ALP SERPL-CCNC: 103 U/L (ref 34–104)
ALT SERPL W P-5'-P-CCNC: 101 U/L (ref 7–52)
ANION GAP SERPL CALCULATED.3IONS-SCNC: 8 MMOL/L (ref 4–13)
AST SERPL W P-5'-P-CCNC: 48 U/L (ref 13–39)
BASOPHILS # BLD AUTO: 0.05 THOUSANDS/ΜL (ref 0–0.1)
BASOPHILS NFR BLD AUTO: 0 % (ref 0–1)
BILIRUB SERPL-MCNC: 0.46 MG/DL (ref 0.2–1)
BLD GP AB SCN SERPL QL: NEGATIVE
BUN SERPL-MCNC: 11 MG/DL (ref 5–25)
CALCIUM ALBUM COR SERPL-MCNC: 9.1 MG/DL (ref 8.3–10.1)
CALCIUM SERPL-MCNC: 8.5 MG/DL (ref 8.4–10.2)
CHLORIDE SERPL-SCNC: 106 MMOL/L (ref 96–108)
CO2 SERPL-SCNC: 23 MMOL/L (ref 21–32)
CREAT SERPL-MCNC: 0.7 MG/DL (ref 0.6–1.3)
EOSINOPHIL # BLD AUTO: 0.19 THOUSAND/ΜL (ref 0–0.61)
EOSINOPHIL NFR BLD AUTO: 2 % (ref 0–6)
ERYTHROCYTE [DISTWIDTH] IN BLOOD BY AUTOMATED COUNT: 14.6 % (ref 11.6–15.1)
GFR SERPL CREATININE-BSD FRML MDRD: 89 ML/MIN/1.73SQ M
GLUCOSE SERPL-MCNC: 89 MG/DL (ref 65–140)
HCT VFR BLD AUTO: 27.4 % (ref 36.5–49.3)
HGB BLD-MCNC: 8.5 G/DL (ref 12–17)
IMM GRANULOCYTES # BLD AUTO: 0.22 THOUSAND/UL (ref 0–0.2)
IMM GRANULOCYTES NFR BLD AUTO: 2 % (ref 0–2)
LYMPHOCYTES # BLD AUTO: 0.87 THOUSANDS/ΜL (ref 0.6–4.47)
LYMPHOCYTES NFR BLD AUTO: 7 % (ref 14–44)
MAGNESIUM SERPL-MCNC: 1.9 MG/DL (ref 1.9–2.7)
MCH RBC QN AUTO: 29.6 PG (ref 26.8–34.3)
MCHC RBC AUTO-ENTMCNC: 31 G/DL (ref 31.4–37.4)
MCV RBC AUTO: 96 FL (ref 82–98)
MONOCYTES # BLD AUTO: 0.8 THOUSAND/ΜL (ref 0.17–1.22)
MONOCYTES NFR BLD AUTO: 7 % (ref 4–12)
NEUTROPHILS # BLD AUTO: 10.17 THOUSANDS/ΜL (ref 1.85–7.62)
NEUTS SEG NFR BLD AUTO: 82 % (ref 43–75)
NRBC BLD AUTO-RTO: 0 /100 WBCS
PLATELET # BLD AUTO: 493 THOUSANDS/UL (ref 149–390)
PMV BLD AUTO: 9.6 FL (ref 8.9–12.7)
POTASSIUM SERPL-SCNC: 3.8 MMOL/L (ref 3.5–5.3)
PROT SERPL-MCNC: 6.2 G/DL (ref 6.4–8.4)
RBC # BLD AUTO: 2.87 MILLION/UL (ref 3.88–5.62)
RH BLD: POSITIVE
SODIUM SERPL-SCNC: 137 MMOL/L (ref 135–147)
SPECIMEN EXPIRATION DATE: NORMAL
WBC # BLD AUTO: 12.3 THOUSAND/UL (ref 4.31–10.16)

## 2025-01-07 PROCEDURE — 99222 1ST HOSP IP/OBS MODERATE 55: CPT | Performed by: INTERNAL MEDICINE

## 2025-01-07 PROCEDURE — 86901 BLOOD TYPING SEROLOGIC RH(D): CPT

## 2025-01-07 PROCEDURE — NC001 PR NO CHARGE: Performed by: STUDENT IN AN ORGANIZED HEALTH CARE EDUCATION/TRAINING PROGRAM

## 2025-01-07 PROCEDURE — C1760 CLOSURE DEV, VASC: HCPCS

## 2025-01-07 PROCEDURE — 75820 VEIN X-RAY ARM/LEG: CPT

## 2025-01-07 PROCEDURE — 99223 1ST HOSP IP/OBS HIGH 75: CPT

## 2025-01-07 PROCEDURE — 76937 US GUIDE VASCULAR ACCESS: CPT

## 2025-01-07 PROCEDURE — C1769 GUIDE WIRE: HCPCS

## 2025-01-07 PROCEDURE — C9772 REVASC LITHOTRIP TIBI/PERONE: HCPCS

## 2025-01-07 PROCEDURE — 76937 US GUIDE VASCULAR ACCESS: CPT | Performed by: STUDENT IN AN ORGANIZED HEALTH CARE EDUCATION/TRAINING PROGRAM

## 2025-01-07 PROCEDURE — 83735 ASSAY OF MAGNESIUM: CPT

## 2025-01-07 PROCEDURE — C1887 CATHETER, GUIDING: HCPCS

## 2025-01-07 PROCEDURE — 37224 PR REVSC OPN/PRG FEM/POP W/ANGIOPLASTY UNI: CPT | Performed by: STUDENT IN AN ORGANIZED HEALTH CARE EDUCATION/TRAINING PROGRAM

## 2025-01-07 PROCEDURE — C1894 INTRO/SHEATH, NON-LASER: HCPCS

## 2025-01-07 PROCEDURE — 85025 COMPLETE CBC W/AUTO DIFF WBC: CPT

## 2025-01-07 PROCEDURE — C1725 CATH, TRANSLUMIN NON-LASER: HCPCS

## 2025-01-07 PROCEDURE — C9764 REVASC INTRAVASC LITHOTRIPSY: HCPCS

## 2025-01-07 PROCEDURE — 99232 SBSQ HOSP IP/OBS MODERATE 35: CPT | Performed by: SURGERY

## 2025-01-07 PROCEDURE — NC001 PR NO CHARGE: Performed by: PODIATRIST

## 2025-01-07 PROCEDURE — 80053 COMPREHEN METABOLIC PANEL: CPT

## 2025-01-07 PROCEDURE — 37228 PR REVSC OPN/PRQ TIB/PERO W/ANGIOPLASTY UNI: CPT | Performed by: STUDENT IN AN ORGANIZED HEALTH CARE EDUCATION/TRAINING PROGRAM

## 2025-01-07 PROCEDURE — 86923 COMPATIBILITY TEST ELECTRIC: CPT

## 2025-01-07 PROCEDURE — 99223 1ST HOSP IP/OBS HIGH 75: CPT | Performed by: NURSE PRACTITIONER

## 2025-01-07 PROCEDURE — 86900 BLOOD TYPING SEROLOGIC ABO: CPT

## 2025-01-07 PROCEDURE — 86850 RBC ANTIBODY SCREEN: CPT

## 2025-01-07 PROCEDURE — 75630 X-RAY AORTA LEG ARTERIES: CPT

## 2025-01-07 PROCEDURE — 99232 SBSQ HOSP IP/OBS MODERATE 35: CPT | Performed by: FAMILY MEDICINE

## 2025-01-07 RX ORDER — CEFAZOLIN SODIUM 2 G/50ML
2000 SOLUTION INTRAVENOUS ONCE
Status: COMPLETED | OUTPATIENT
Start: 2025-01-07 | End: 2025-01-08

## 2025-01-07 RX ORDER — FENTANYL CITRATE 50 UG/ML
INJECTION, SOLUTION INTRAMUSCULAR; INTRAVENOUS AS NEEDED
Status: DISCONTINUED | OUTPATIENT
Start: 2025-01-07 | End: 2025-01-07

## 2025-01-07 RX ORDER — IODIXANOL 320 MG/ML
200 INJECTION, SOLUTION INTRAVASCULAR
Status: COMPLETED | OUTPATIENT
Start: 2025-01-07 | End: 2025-01-07

## 2025-01-07 RX ORDER — HEPARIN SODIUM 1000 [USP'U]/ML
INJECTION, SOLUTION INTRAVENOUS; SUBCUTANEOUS AS NEEDED
Status: DISCONTINUED | OUTPATIENT
Start: 2025-01-07 | End: 2025-01-07

## 2025-01-07 RX ORDER — ACETAMINOPHEN 325 MG/1
975 TABLET ORAL EVERY 8 HOURS SCHEDULED
Status: DISCONTINUED | OUTPATIENT
Start: 2025-01-07 | End: 2025-01-24 | Stop reason: HOSPADM

## 2025-01-07 RX ORDER — CHLORHEXIDINE GLUCONATE ORAL RINSE 1.2 MG/ML
15 SOLUTION DENTAL ONCE
Status: COMPLETED | OUTPATIENT
Start: 2025-01-08 | End: 2025-01-08

## 2025-01-07 RX ORDER — VENLAFAXINE 25 MG/1
25 TABLET ORAL DAILY
Status: DISCONTINUED | OUTPATIENT
Start: 2025-01-08 | End: 2025-01-24 | Stop reason: HOSPADM

## 2025-01-07 RX ORDER — PROPOFOL 10 MG/ML
INJECTION, EMULSION INTRAVENOUS CONTINUOUS PRN
Status: DISCONTINUED | OUTPATIENT
Start: 2025-01-07 | End: 2025-01-07

## 2025-01-07 RX ORDER — MIDAZOLAM HYDROCHLORIDE 2 MG/2ML
INJECTION, SOLUTION INTRAMUSCULAR; INTRAVENOUS AS NEEDED
Status: DISCONTINUED | OUTPATIENT
Start: 2025-01-07 | End: 2025-01-07

## 2025-01-07 RX ORDER — CEFAZOLIN SODIUM 2 G/50ML
2000 SOLUTION INTRAVENOUS ONCE
Status: DISCONTINUED | OUTPATIENT
Start: 2025-01-07 | End: 2025-01-07

## 2025-01-07 RX ORDER — SODIUM CHLORIDE 9 MG/ML
INJECTION, SOLUTION INTRAVENOUS CONTINUOUS PRN
Status: DISCONTINUED | OUTPATIENT
Start: 2025-01-07 | End: 2025-01-07

## 2025-01-07 RX ORDER — HYDROMORPHONE HCL IN WATER/PF 6 MG/30 ML
0.2 PATIENT CONTROLLED ANALGESIA SYRINGE INTRAVENOUS EVERY 4 HOURS PRN
Status: DISCONTINUED | OUTPATIENT
Start: 2025-01-07 | End: 2025-01-15

## 2025-01-07 RX ORDER — LIDOCAINE WITH 8.4% SOD BICARB 0.9%(10ML)
SYRINGE (ML) INJECTION AS NEEDED
Status: COMPLETED | OUTPATIENT
Start: 2025-01-07 | End: 2025-01-07

## 2025-01-07 RX ADMIN — OXYCODONE HYDROCHLORIDE 10 MG: 10 TABLET ORAL at 15:17

## 2025-01-07 RX ADMIN — FAMOTIDINE 20 MG: 20 TABLET, FILM COATED ORAL at 09:02

## 2025-01-07 RX ADMIN — ACETAMINOPHEN 975 MG: 325 TABLET, FILM COATED ORAL at 15:17

## 2025-01-07 RX ADMIN — TAMSULOSIN HYDROCHLORIDE 0.4 MG: 0.4 CAPSULE ORAL at 15:32

## 2025-01-07 RX ADMIN — DEXMEDETOMIDINE HYDROCHLORIDE 4 MCG: 100 INJECTION, SOLUTION INTRAVENOUS at 11:20

## 2025-01-07 RX ADMIN — HYDROMORPHONE HYDROCHLORIDE 0.2 MG: 0.2 INJECTION, SOLUTION INTRAMUSCULAR; INTRAVENOUS; SUBCUTANEOUS at 18:27

## 2025-01-07 RX ADMIN — FINASTERIDE 5 MG: 5 TABLET, FILM COATED ORAL at 09:02

## 2025-01-07 RX ADMIN — PIPERACILLIN SODIUM AND TAZOBACTAM SODIUM 4.5 G: 36; 4.5 INJECTION, POWDER, LYOPHILIZED, FOR SOLUTION INTRAVENOUS at 23:37

## 2025-01-07 RX ADMIN — DEXMEDETOMIDINE HYDROCHLORIDE 8 MCG: 100 INJECTION, SOLUTION INTRAVENOUS at 12:11

## 2025-01-07 RX ADMIN — GABAPENTIN 600 MG: 300 CAPSULE ORAL at 09:02

## 2025-01-07 RX ADMIN — PENTOXIFYLLINE 400 MG: 400 TABLET, EXTENDED RELEASE ORAL at 13:32

## 2025-01-07 RX ADMIN — METHOCARBAMOL 500 MG: 500 TABLET ORAL at 09:02

## 2025-01-07 RX ADMIN — DEXMEDETOMIDINE HYDROCHLORIDE 8 MCG: 100 INJECTION, SOLUTION INTRAVENOUS at 12:15

## 2025-01-07 RX ADMIN — MIDAZOLAM 1 MG: 1 INJECTION INTRAMUSCULAR; INTRAVENOUS at 10:47

## 2025-01-07 RX ADMIN — PREDNISONE 5 MG: 5 TABLET ORAL at 09:07

## 2025-01-07 RX ADMIN — FENTANYL CITRATE 50 MCG: 50 INJECTION INTRAMUSCULAR; INTRAVENOUS at 11:34

## 2025-01-07 RX ADMIN — MIDAZOLAM 1 MG: 1 INJECTION INTRAMUSCULAR; INTRAVENOUS at 11:10

## 2025-01-07 RX ADMIN — PENTOXIFYLLINE 400 MG: 400 TABLET, EXTENDED RELEASE ORAL at 09:04

## 2025-01-07 RX ADMIN — PIPERACILLIN SODIUM AND TAZOBACTAM SODIUM 4.5 G: 36; 4.5 INJECTION, POWDER, LYOPHILIZED, FOR SOLUTION INTRAVENOUS at 16:40

## 2025-01-07 RX ADMIN — PIPERACILLIN SODIUM AND TAZOBACTAM SODIUM 4.5 G: 36; 4.5 INJECTION, POWDER, LYOPHILIZED, FOR SOLUTION INTRAVENOUS at 11:26

## 2025-01-07 RX ADMIN — NOREPINEPHRINE BITARTRATE 3 MCG/MIN: 1 INJECTION, SOLUTION, CONCENTRATE INTRAVENOUS at 12:17

## 2025-01-07 RX ADMIN — FENTANYL CITRATE 25 MCG: 50 INJECTION INTRAMUSCULAR; INTRAVENOUS at 10:47

## 2025-01-07 RX ADMIN — ATORVASTATIN CALCIUM 80 MG: 80 TABLET, FILM COATED ORAL at 15:32

## 2025-01-07 RX ADMIN — FERROUS SULFATE TAB 325 MG (65 MG ELEMENTAL FE) 325 MG: 325 (65 FE) TAB at 09:02

## 2025-01-07 RX ADMIN — ASPIRIN 81 MG CHEWABLE TABLET 81 MG: 81 TABLET CHEWABLE at 09:02

## 2025-01-07 RX ADMIN — PROPOFOL 20 MCG/KG/MIN: 10 INJECTION, EMULSION INTRAVENOUS at 12:06

## 2025-01-07 RX ADMIN — PREDNISONE 5 MG: 5 TABLET ORAL at 15:32

## 2025-01-07 RX ADMIN — Medication 10 ML: at 11:12

## 2025-01-07 RX ADMIN — GABAPENTIN 600 MG: 300 CAPSULE ORAL at 17:03

## 2025-01-07 RX ADMIN — SODIUM CHLORIDE: 0.9 INJECTION, SOLUTION INTRAVENOUS at 10:42

## 2025-01-07 RX ADMIN — METOPROLOL SUCCINATE 25 MG: 25 TABLET, EXTENDED RELEASE ORAL at 09:02

## 2025-01-07 RX ADMIN — METHOCARBAMOL 500 MG: 500 TABLET ORAL at 15:17

## 2025-01-07 RX ADMIN — DEXMEDETOMIDINE HYDROCHLORIDE 4 MCG: 100 INJECTION, SOLUTION INTRAVENOUS at 11:10

## 2025-01-07 RX ADMIN — FENTANYL CITRATE 25 MCG: 50 INJECTION INTRAMUSCULAR; INTRAVENOUS at 11:10

## 2025-01-07 RX ADMIN — EZETIMIBE 10 MG: 10 TABLET ORAL at 09:02

## 2025-01-07 RX ADMIN — ALLOPURINOL 100 MG: 100 TABLET ORAL at 09:02

## 2025-01-07 RX ADMIN — HEPARIN SODIUM 6000 UNITS: 1000 INJECTION INTRAVENOUS; SUBCUTANEOUS at 11:36

## 2025-01-07 RX ADMIN — POTASSIUM CHLORIDE 20 MEQ: 1500 TABLET, EXTENDED RELEASE ORAL at 09:02

## 2025-01-07 RX ADMIN — METHOCARBAMOL 500 MG: 500 TABLET ORAL at 21:19

## 2025-01-07 RX ADMIN — IODIXANOL 70 ML: 320 INJECTION, SOLUTION INTRAVASCULAR at 13:00

## 2025-01-07 RX ADMIN — PENTOXIFYLLINE 400 MG: 400 TABLET, EXTENDED RELEASE ORAL at 16:47

## 2025-01-07 RX ADMIN — OXYCODONE HYDROCHLORIDE 10 MG: 10 TABLET ORAL at 09:04

## 2025-01-07 RX ADMIN — CLOPIDOGREL BISULFATE 75 MG: 75 TABLET, FILM COATED ORAL at 09:02

## 2025-01-07 RX ADMIN — ACETAMINOPHEN 975 MG: 325 TABLET, FILM COATED ORAL at 21:19

## 2025-01-07 RX ADMIN — REMIFENTANIL HYDROCHLORIDE 0.04 MCG/KG/MIN: 1 INJECTION, POWDER, LYOPHILIZED, FOR SOLUTION INTRAVENOUS at 11:44

## 2025-01-07 NOTE — CONSULTS
PHYSICAL MEDICINE AND REHABILITATION CONSULT NOTE  Royce Rene 79 y.o. male MRN: [id number]  Unit/Bed#: [facility] 523-01 Encounter: [id number]    Requested by (Physician/Service): Kelli Pennington MD  Reason for Consultation:  Assessment of rehabilitation needs    Assessment:  Rehabilitation Diagnosis:   Wet gangrene right foot pending AKA  PAD s/p left leg arteriogram 1/7/2025  Impaired mobility and self care    Recommendations:  Rehabilitation Plan:  Continue PT/OT (SLP) while on acute care.  The patient may require longer course of sub-acute inpatient rehabilitation once medically stable. He will undergo right AKA on 1/8/2025.     Medical Co-morbidities Plan:  PAD with worsening foot wounds  CHF  Hx of prostate cancer receiving chemotherapy   Diarrhea  CAD  Hyponatremia   Iron deficiency anemia   Bowel plan: Incontinent 1/6/2025  Bladder plan: barber  DVT ppx: Lovenox and SCD    Thank you for this consultation.  Do not hesitate to contact service with further questions.      OMAIRA Hicks  PM&R    I have spent a total time of 30 minutes on 01/07/25 in caring for this patient including Patient and family education, Counseling / Coordination of care, Documenting in the medical record, Reviewing / ordering tests, medicine, procedures  , Obtaining or reviewing history  , and Communicating with other healthcare professionals .        History of Present Illness:  Royce Rene is a 79 y.o. male with a PMH of PAD, CHF, diarrhea, prostate cancer, left foot ulcer, anemia who presented to the [facility] on 12/26/2024 with failure to thrive, diarrhea and generalized weakness after chemotherapy. He was pending rehabilitation placement when he was noted to have gangrene for the right foot and poor healing wound on the left. He was transferred to [place] for vascular evaluation. He was found to have absent right popliteal pulse and CTA with runoff showed right SFA stent re-occlusion and distal  left CFA and proximal left SFA occlusion. It was felt that there were no reasonable RLE re-vascularization options. LLE intervention and palliative wound to the RLE was recommended. He had worsening demarcation of the right foot compared to exam on 1/6 concerning for wet gangrene. Plan is to undergo left arteriogram in IR on 1/7/2025 and right AKA likely on 1/8/2025. PM&R are consulted for rehabilitation recommendations.     The patient was seen in his room following angiogram. He is on bed rest with knee immobilizer for 4 hours. He reports 6/10 right foot pain currently. He states he lives in the 2nd floor and his son lives on the 3rd floor. He will come down when he calls him if he needs help.     Review of Systems: 10 point ROS negative except for what is noted in HPI    Function:  Prior level of function and living situation:  The patient lives in a multi-level home with 5-6 MODESTA. There is a stair glide entrance. There are grab bars in the shower, shower chair and toilet raiser. DME includes a walker, cane, wheelchair (electric) and 2 scooters. He lives with his son who lives on the 3rd floor, he lives in the 2nd floor. He was mostly wheelchair dependent.      Current level of function:  Physical Therapy: Supervision for bed mobility, minimal assist for transfers and ambulation   Occupational Therapy: Supervision for eating, grooming, minimal assist for UB bathing/dressing, maximal assist for LB bathing/dressing     Physical Exam:  /58   Pulse 83   Temp 98.3 °F (36.8 °C)   Resp 18   SpO2 94%        Intake/Output Summary (Last 24 hours) at 1/7/2025 1213  Last data filed at 1/7/2025 1126  Gross per 24 hour   Intake 100 ml   Output 1425 ml   Net -1325 ml       There is no height or weight on file to calculate BMI.      Physical Exam  Constitutional:       General: He is not in acute distress.     Appearance: He is not toxic-appearing.   HENT:      Head: Normocephalic and atraumatic.   Eyes:       Extraocular Movements: Extraocular movements intact.   Pulmonary:      Effort: Pulmonary effort is normal. No respiratory distress.   Abdominal:      General: There is no distension.   Musculoskeletal:      Comments: Exam limited, patient on bed rest, moving all extremities, dressing in place to bilateral feet/ankles.   Neurological:      Mental Status: He is alert and oriented to person, place, and time.   Psychiatric:         Mood and Affect: Mood normal.        Social History:    Social History     Socioeconomic History    Marital status:      Spouse name: Not on file    Number of children: 3    Years of education: Not on file    Highest education level: Not on file   Occupational History    Not on file   Tobacco Use    Smoking status: Former     Current packs/day: 0.00     Types: Cigarettes     Start date: 1962     Quit date: 2002     Years since quittin.5     Passive exposure: Past    Smokeless tobacco: Never   Vaping Use    Vaping status: Never Used   Substance and Sexual Activity    Alcohol use: Not Currently    Drug use: Not Currently    Sexual activity: Not on file   Other Topics Concern    Not on file   Social History Narrative    Single    Son lives in the attic of his home, minimal contact     Social Drivers of Health     Financial Resource Strain: Low Risk  (2023)    Overall Financial Resource Strain (CARDIA)     Difficulty of Paying Living Expenses: Not hard at all   Food Insecurity: No Food Insecurity (2025)    Hunger Vital Sign     Worried About Running Out of Food in the Last Year: Never true     Ran Out of Food in the Last Year: Never true   Transportation Needs: No Transportation Needs (2025)    PRAPARE - Transportation     Lack of Transportation (Medical): No     Lack of Transportation (Non-Medical): No   Recent Concern: Transportation Needs - Unmet Transportation Needs (2024)    OASIS : Transportation     Lack of Transportation (Medical): Yes     Lack  of Transportation (Non-Medical): No     Patient Unable or Declines to Respond: No   Physical Activity: Not on file   Stress: Not on file   Social Connections: Not on file   Intimate Partner Violence: Unknown (12/26/2024)    Nursing IPS     Feels Physically and Emotionally Safe: Not on file     Physically Hurt by Someone: Not on file     Humiliated or Emotionally Abused by Someone: Not on file     Physically Hurt by Someone: No     Hurt or Threatened by Someone: No   Housing Stability: Low Risk  (1/2/2025)    Housing Stability Vital Sign     Unable to Pay for Housing in the Last Year: No     Number of Times Moved in the Last Year: 0     Homeless in the Last Year: No        Family History:    Family History   Problem Relation Age of Onset    Cancer Mother          Medications:     Current Facility-Administered Medications:     acetaminophen (TYLENOL) tablet 650 mg, 650 mg, Oral, Q6H PRN, Tato Nixon MD, 650 mg at 01/01/25 1722    allopurinol (ZYLOPRIM) tablet 100 mg, 100 mg, Oral, Daily, Tato Nixon MD, 100 mg at 01/07/25 0902    aspirin chewable tablet 81 mg, 81 mg, Oral, Daily, Tato Nixon MD, 81 mg at 01/07/25 0902    atorvastatin (LIPITOR) tablet 80 mg, 80 mg, Oral, Daily With Dinner, Tato Nixon MD, 80 mg at 01/06/25 1727    bismuth subsalicylate (PEPTO BISMOL) oral suspension 524 mg, 524 mg, Oral, Q6H PRN, Tato Nixon MD    [START ON 1/8/2025] chlorhexidine (PERIDEX) 0.12 % oral rinse 15 mL, 15 mL, Swish & Spit, Once, Kyle Paulino PA-C    clopidogrel (PLAVIX) tablet 75 mg, 75 mg, Oral, Daily, Tato Nixon MD, 75 mg at 01/07/25 0902    Diclofenac Sodium (VOLTAREN) 1 % topical gel 2 g, 2 g, Topical, 4x Daily, Tato Nixon MD, 2 g at 01/02/25 2150    [Held by provider] Empagliflozin (JARDIANCE) tablet 10 mg, 10 mg, Oral, QAM, Tato Nixon MD, 10 mg at 01/04/25 0909    enoxaparin (LOVENOX) subcutaneous injection 40 mg, 40  mg, Subcutaneous, Daily, Tato Nixon MD, 40 mg at 01/06/25 0932    ezetimibe (ZETIA) tablet 10 mg, 10 mg, Oral, Daily, Tato Nixon MD, 10 mg at 01/07/25 0902    famotidine (PEPCID) tablet 20 mg, 20 mg, Oral, Daily, Tato Nixon MD, 20 mg at 01/07/25 0902    ferrous sulfate tablet 325 mg, 325 mg, Oral, Daily, Tato Nixon MD, 325 mg at 01/07/25 0902    finasteride (PROSCAR) tablet 5 mg, 5 mg, Oral, Daily, Tato Nixon MD, 5 mg at 01/07/25 0902    [Held by provider] furosemide (LASIX) tablet 40 mg, 40 mg, Oral, Daily, Tato Nixon MD, 40 mg at 01/04/25 0905    gabapentin (NEURONTIN) capsule 600 mg, 600 mg, Oral, BID, Tato Nixon MD, 600 mg at 01/07/25 0902    HYDROmorphone (DILAUDID) injection 1 mg, 1 mg, Intravenous, Q3H PRN, Tato Nixon MD, 1 mg at 01/06/25 2042    lidocaine 1% buffered, , Infiltration, PRN, Gavin Sykes MD, 10 mL at 01/07/25 1112    loperamide (IMODIUM) capsule 2 mg, 2 mg, Oral, 4x Daily PRN, Tato Nixon MD    [Held by provider] losartan (COZAAR) tablet 12.5 mg, 12.5 mg, Oral, Daily, Tato Nixon MD    menthol-methyl salicylate (BENGAY) 10-15 % cream, , Apply externally, 4x Daily PRN, Tato Nixon MD, Given at 01/03/25 2136    methocarbamol (ROBAXIN) tablet 500 mg, 500 mg, Oral, TID, Tato Nixon MD, 500 mg at 01/07/25 0902    metoprolol succinate (TOPROL-XL) 24 hr tablet 25 mg, 25 mg, Oral, Daily, Tato Nixon MD, 25 mg at 01/07/25 0902    naloxone (NARCAN) 0.04 mg/mL syringe 0.04 mg, 0.04 mg, Intravenous, Q1MIN PRN, OMAIRA Dumont    ondansetron (ZOFRAN) injection 4 mg, 4 mg, Intravenous, Q6H PRN, Tato Nixon MD    oxyCODONE (ROXICODONE) IR tablet 5 mg, 5 mg, Oral, Q4H PRN **OR** oxyCODONE (ROXICODONE) immediate release tablet 10 mg, 10 mg, Oral, Q4H PRN, Tato Nixon MD, 10 mg at 01/07/25 0904    pentoxifylline  (TRENtal) ER tablet 400 mg, 400 mg, Oral, TID With Meals, Tato Nixon MD, 400 mg at 01/07/25 0904    [COMPLETED] piperacillin-tazobactam (ZOSYN) 4.5 g in sodium chloride 0.9 % 100 mL IV LOADING DOSE, 4.5 g, Intravenous, Once, 4.5 g at 01/07/25 1126 **FOLLOWED BY** piperacillin-tazobactam (ZOSYN) 4.5 g in sodium chloride 0.9 % 100 mL IVPB (EXTENDED INFUSION), 4.5 g, Intravenous, Q8H, Davy Willoughby MD    potassium chloride (Klor-Con M20) CR tablet 20 mEq, 20 mEq, Oral, Daily, Tato Nixon MD, 20 mEq at 01/07/25 0902    predniSONE tablet 5 mg, 5 mg, Oral, BID With Meals, Tato Nixon MD, 5 mg at 01/07/25 0907    tamsulosin (FLOMAX) capsule 0.4 mg, 0.4 mg, Oral, Daily With Dinner, Tato Nixon MD, 0.4 mg at 01/06/25 1727    Facility-Administered Medications Ordered in Other Encounters:     dexmedeTOMIDine (Precedex) 400 mcg in sodium chloride 0.9 % 100 mL infusion, , Intravenous, Continuous PRN, Shahram Humphries CRNA, 8 mcg at 01/07/25 1211    fentaNYL injection, , Intravenous, PRN, Shahram Humphries CRNA, 50 mcg at 01/07/25 1134    heparin (porcine) injection, , Intravenous, PRN, Shahram Humphries CRNA, 6,000 Units at 01/07/25 1136    midazolam (VERSED) injection, , Intravenous, PRN, Shahram Humphries CRNA, 1 mg at 01/07/25 1110    propofol (DIPRIVAN) 1000 mg in 100 mL infusion (premix), , Intravenous, Continuous PRN, Shahram Humphries CRNA, Last Rate: 12.024 mL/hr at 01/07/25 1206, 20 mcg/kg/min at 01/07/25 1206    remifentanil (ULTIVA) 25 mcg/mL in sodium chloride 0.9 % 20 mL, , Intravenous, Continuous PRN, Shahram Humphries CRNA, Last Rate: 7.214 mL/hr at 01/07/25 1158, 0.03 mcg/kg/min at 01/07/25 1158    sodium chloride 0.9 % infusion, , Intravenous, Continuous PRN, Shahram Humphries CRNA, New Bag at 01/07/25 1042    Past Medical History:     Past Medical History:   Diagnosis Date    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    Coronary artery disease 2001    S/p stenting to  circumflex and RCA    HFrEF (heart failure with reduced ejection fraction) (HCC) 06/14/2021    High cholesterol     Pacemaker     Prostate cancer (HCC)         Past Surgical History:     Past Surgical History:   Procedure Laterality Date    CARDIAC CATHETERIZATION      CARDIAC ELECTROPHYSIOLOGY PROCEDURE N/A 12/23/2021    Procedure: Implant ICD - bi ventricular;  Surgeon: Jonas Oviedo MD;  Location: [facility];  Service: Cardiology    COLONOSCOPY      IR LOWER EXTREMITY ANGIOGRAM  04/18/2023    IR LOWER EXTREMITY ANGIOGRAM  10/30/2024    KS TEAEC W/WO PATCH GRAFT COMMON FEMORAL Right 07/09/2021    Procedure: ENDARTERECTOMY ARTERIAL FEMORAL;  Surgeon: Serina Cook DO;  Location: [facility];  Service: Vascular         Allergies:     No Known Allergies        LABORATORY RESULTS:      Lab Results   Component Value Date    HGB 8.5 (L) 01/07/2025    HCT 27.4 (L) 01/07/2025    WBC 12.30 (H) 01/07/2025     Lab Results   Component Value Date    BUN 11 01/07/2025    K 3.8 01/07/2025     01/07/2025    CREATININE 0.70 01/07/2025     Lab Results   Component Value Date    PROTIME 14.5 01/03/2025    INR 1.10 01/03/2025        DIAGNOSTIC STUDIES: Reviewed  No results found.

## 2025-01-07 NOTE — PROGRESS NOTES
Progress Note - Hospitalist   Name: Royce Rene 79 y.o. male I MRN: [id number]  Unit/Bed#: [facility] 523-01 I Date of Admission: 1/1/2025   Date of Service: 1/7/2025 I Hospital Day: 6    Assessment & Plan  PAD (peripheral artery disease) (HCC)    Right foot great toe gangrene with concern for underlying osteomyelitis  Severe peripheral vascular disease  Ulceration left foot midfoot lateral  History of extensive peripheral arterial disease  Podiatry and Vascular onboard  Continue aspirin, Plavix, atorvastatin, pentoxifylline  Transferred to [place] on 1/1/2025.  Discussed with podiatry and vascular.  Per vascular the CTA of the lower extremity shows very severe disease.  Not able to do limflow procedure.  Reached out to podiatry and they are also limited due to poor vasculature of the leg and are unable to do surgery.  Per vascular patient has severe SFA stent occlusion and distal left CFA and proximal left SFA occlusion  No more revasularization options  Palliative wound care advised  IR consuled for IR LLE -status a gram shockwave CFA SFA TPT PT today by interventional radiology  Vascular surgery is planning for right AKA tomorrow-cardiology and acute pain and PMR consult appreciated-started on on Zosyn per vascular surgery  Ambulatory dysfunction  Baseline- close to wheelchair bounded- minimal walk.  Fall precautions  Will find placement after procedure  Chronic combined systolic and diastolic CHF (congestive heart failure) (HCC)  Wt Readings from Last 3 Encounters:   12/26/24 100 kg (220 lb 14.4 oz)   12/13/24 103 kg (227 lb)   12/12/24 103 kg (227 lb)   Home diuretic: PO Lasix 40mg QD \  Continue metoprolol, losartan, Jardiance  Low-salt diet  Monitor I/O, daily weights        Prostate cancer (HCC)  History of prostate cancer follows with oncology receiving chemotherapy (last on 12/12)  Follows with Dr. Hernandez  Current regimen is Cabazitaxel, Neulasta, prednisone, Lupron, denosumab  Continue to follow in outpatient  "setting  Diarrhea  Patient with episodes of diarrhea since receiving chemotherapy last week.  He also reports some nausea and vomiting.  Poor PO intake  Likely chemotherapy associated.  Improving  C. difficile and stool enteric panel negative  Supportive cares  Replete electrolytes  Imodium as needed  CAD (coronary artery disease)  Continue aspirin, metoprolol, atorvastatin  Urinary retention  Had to be straight cathed x 1  Started on Flomax and Finasteride with improvement in symptoms  Has been able to urinate on his own  Urinary retention protocol  Outpatient followup with Urology  Right ankle pain  Lab Results   Component Value Date    URICACID 11.5 (H) 12/26/2024       Patient reports right ankle pain  No obvious erythema swelling  X-ray reveals some sclerosis of calcaneus possible stress injury  CRP 79.1, uric acid 11.5.  Possible gout  Podiatry following, appreciate recommendations   Wounds do not appear to be acutely infected.  Agree with prednisone for acute gouty arthropathy.  No surgical plans  Supportive cares  Started on allopurinol  Ulcer of left foot, limited to breakdown of skin (HCC)  Pic under media.  Low suspicion for active infection  Per podiatry, no surgical plans  Status post angiogram with the intervention today      Hyponatremia  Recent Labs     01/07/25  0505   SODIUM 137     No results found for: \"OSMOUA\", \"NAUR\", \"OSMOLALITSER\"    Resolved    Iron deficiency anemia  Lab Results   Component Value Date    HGB 8.5 (L) 01/07/2025    HGB 8.5 (L) 01/04/2025    HGB 8.5 (L) 01/03/2025    HGB 8.6 (L) 01/02/2025    HGB 8.9 (L) 01/01/2025    CONCFE 10 (L) 12/28/2024    IRON 21 (L) 12/28/2024    FERRITIN 1,012 (H) 12/28/2024    TIBC 214.2 (L) 12/28/2024     Continue iron supplementation    Ischemic cardiomyopathy    Mixed hyperlipidemia    History of transcatheter aortic valve replacement (TAVR)    Encounter for preoperative assessment for noncoronary cardiac surgery      VTE Pharmacologic " Prophylaxis: VTE Score: 6 Moderate Risk (Score 3-4) - Pharmacological DVT Prophylaxis Ordered: enoxaparin (Lovenox).    Mobility:   Basic Mobility Inpatient Raw Score: 9  -HLM Goal: 3: Sit at edge of bed  JH-HLM Achieved: 4: Move to chair/commode  JH-HLM Goal achieved. Continue to encourage appropriate mobility.    Patient Centered Rounds: I performed bedside rounds with nursing staff today.   Discussions with Specialists or Other Care Team Provider:     Education and Discussions with Family / Patient: Called son left voicemail with callback number  Current Length of Stay: 6 day(s)  Current Patient Status: Inpatient   Certification Statement: The patient will continue to require additional inpatient hospital stay due to pending surgery  Discharge Plan:     Code Status: Level 1 - Full Code    Subjective   Patient seen and examined.  Patient was evaluated after the a gram today.  Complaining of pain.  Patient is also upset that he has to keep his right upper extremity straight postprocedure    Objective :  Temp:  [98.3 °F (36.8 °C)-98.6 °F (37 °C)] 98.6 °F (37 °C)  HR:  [] 86  BP: (114-130)/(58-94) 114/59  Resp:  [16-18] 16  SpO2:  [89 %-96 %] 96 %  O2 Device: None (Room air)    There is no height or weight on file to calculate BMI.     Input and Output Summary (last 24 hours):     Intake/Output Summary (Last 24 hours) at 1/7/2025 1856  Last data filed at 1/7/2025 1808  Gross per 24 hour   Intake 1100 ml   Output 1725 ml   Net -625 ml       Physical Exam  Constitutional:       General: He is not in acute distress.     Appearance: He is not ill-appearing.   HENT:      Head: Normocephalic.      Nose: Nose normal.   Eyes:      General: No scleral icterus.  Cardiovascular:      Rate and Rhythm: Normal rate and regular rhythm.      Heart sounds: No murmur heard.  Pulmonary:      Effort: Pulmonary effort is normal.   Abdominal:      General: There is no distension.      Tenderness: There is no abdominal tenderness.    Musculoskeletal:      Comments: Right toes-gangrenous changes noted   Neurological:      Mental Status: He is alert.           Lines/Drains:  Lines/Drains/Airways       Active Status       Name Placement date Placement time Site Days    Urethral Catheter Straight-tip;Non-latex 16 Fr. 01/02/25  1611  Straight-tip;Non-latex  5                  Urinary Catheter:  Goal for removal: Plan to remove POD#1         Central Line:  Goal for removal: Port-A-Cath not accessed               Lab Results: I have reviewed the following results:   Results from last 7 days   Lab Units 01/07/25  0005   WBC Thousand/uL 12.30*   HEMOGLOBIN g/dL 8.5*   HEMATOCRIT % 27.4*   PLATELETS Thousands/uL 493*   SEGS PCT % 82*   LYMPHO PCT % 7*   MONO PCT % 7   EOS PCT % 2     Results from last 7 days   Lab Units 01/07/25  0505   SODIUM mmol/L 137   POTASSIUM mmol/L 3.8   CHLORIDE mmol/L 106   CO2 mmol/L 23   BUN mg/dL 11   CREATININE mg/dL 0.70   ANION GAP mmol/L 8   CALCIUM mg/dL 8.5   ALBUMIN g/dL 3.2*   TOTAL BILIRUBIN mg/dL 0.46   ALK PHOS U/L 103   ALT U/L 101*   AST U/L 48*   GLUCOSE RANDOM mg/dL 89     Results from last 7 days   Lab Units 01/03/25  0453   INR  1.10     Results from last 7 days   Lab Units 01/03/25  1034 01/02/25  1033   POC GLUCOSE mg/dl 113 91               Recent Cultures (last 7 days):               Last 24 Hours Medication List:     Current Facility-Administered Medications:     acetaminophen (TYLENOL) tablet 975 mg, Q8H DEWAYNE    allopurinol (ZYLOPRIM) tablet 100 mg, Daily    aspirin chewable tablet 81 mg, Daily    atorvastatin (LIPITOR) tablet 80 mg, Daily With Dinner    bismuth subsalicylate (PEPTO BISMOL) oral suspension 524 mg, Q6H PRN    [START ON 1/8/2025] chlorhexidine (PERIDEX) 0.12 % oral rinse 15 mL, Once    clopidogrel (PLAVIX) tablet 75 mg, Daily    Diclofenac Sodium (VOLTAREN) 1 % topical gel 2 g, 4x Daily    [Held by provider] Empagliflozin (JARDIANCE) tablet 10 mg, QAM    enoxaparin (LOVENOX) subcutaneous  injection 40 mg, Daily    ezetimibe (ZETIA) tablet 10 mg, Daily    famotidine (PEPCID) tablet 20 mg, Daily    ferrous sulfate tablet 325 mg, Daily    finasteride (PROSCAR) tablet 5 mg, Daily    [Held by provider] furosemide (LASIX) tablet 40 mg, Daily    gabapentin (NEURONTIN) capsule 600 mg, BID    HYDROmorphone HCl (DILAUDID) injection 0.2 mg, Q4H PRN    loperamide (IMODIUM) capsule 2 mg, 4x Daily PRN    [Held by provider] losartan (COZAAR) tablet 12.5 mg, Daily    menthol-methyl salicylate (BENGAY) 10-15 % cream, 4x Daily PRN    methocarbamol (ROBAXIN) tablet 500 mg, TID    metoprolol succinate (TOPROL-XL) 24 hr tablet 25 mg, Daily    naloxone (NARCAN) 0.04 mg/mL syringe 0.04 mg, Q1MIN PRN    ondansetron (ZOFRAN) injection 4 mg, Q6H PRN    oxyCODONE (ROXICODONE) IR tablet 5 mg, Q4H PRN **OR** oxyCODONE (ROXICODONE) immediate release tablet 10 mg, Q4H PRN    pentoxifylline (TRENtal) ER tablet 400 mg, TID With Meals    [COMPLETED] piperacillin-tazobactam (ZOSYN) 4.5 g in sodium chloride 0.9 % 100 mL IV LOADING DOSE, Once, Last Rate: Stopped (01/07/25 1549) **FOLLOWED BY** piperacillin-tazobactam (ZOSYN) 4.5 g in sodium chloride 0.9 % 100 mL IVPB (EXTENDED INFUSION), Q8H, Last Rate: 4.5 g (01/07/25 1640)    potassium chloride (Klor-Con M20) CR tablet 20 mEq, Daily    predniSONE tablet 5 mg, BID With Meals    tamsulosin (FLOMAX) capsule 0.4 mg, Daily With Dinner    [START ON 1/8/2025] venlafaxine (EFFEXOR) tablet 25 mg, Daily    Administrative Statements   Today, Patient Was Seen By: Kelli Pennington MD      **Please Note: This note may have been constructed using a voice recognition system.**

## 2025-01-07 NOTE — CONSULTS
Consultation - Acute Pain   Name: Royce Rene 79 y.o. male I MRN: [id number]  [facility] I Date of Admission: 1/1/2025   Date of Service: 1/7/2025 I Hospital Day: 6   Inpatient consult to Acute Pain Service  Consult performed by: OMAIRA Dumont  Consult ordered by: Davy Willoughby MD        Physician Requesting Evaluation: Kelli Pennington MD   Reason for Evaluation / Principal Problem: Pre op eval for AKA    Assessment & Plan  PAD (peripheral artery disease) (HCC)  Patient with history of PAD and right foot gangrene  Planned for right AKA with vascular surgery 1/8/2025  Patient would be agreeable to peripheral nerve blocks for postoperative analgesia    Multimodal analgesia:  Change as needed Tylenol to 975 mg every 8 hours scheduled  Gabapentin 600 mg 2 times daily, home medication  Estimated Creatinine Clearance: 98 mL/min (by C-G formula based on SCr of 0.7 mg/dL).  Robaxin 500 mg every 8 hours, home medication  Start venlafaxine 25 mg daily as second line agent for neuropathic pain  Oxycodone 5 mg every 4 hours as needed for moderate pain  Oxycodone 10 mg every 4 hours as needed for severe pain  Decrease IV dilaudid to 0.2 mg every 4 hours as needed for breakthrough pain  Narcan as needed for respiratory depression/opioid reversal   Prostate cancer (HCC)  Patient follows with palliative care outpatient for symptom management of metastatic prostate cancer.    Outpatient analgesic regimen includes:  Acetaminophen 1000 mg 3 times daily as needed  Gabapentin 600 mg 2 times daily  Methocarbamol 500 mg 3 times daily  Oxycodone 5 mg 2 times daily as needed          APS will continue to follow. Please contact Acute Pain Service - via SecureChat from [phone] with additional questions or concerns. See SecureSalsify or [facility]in[place]ath for additional contacts and after hours information.     History of Present Illness    HPI: Royce Rene is a 79 y.o. year old male with significant past medical history including  PAD with worsening right foot wound and gangrene.  He is planned for right AKA/8/2025.  APS consulted for preoperative evaluation.    Seen at bedside this afternoon after returning from  for angiogram.  No significant distress.  He reports some right-sided foot pain but not severe.  Feels as needed oxycodone has been effective in alleviating pain.  Patient would be agreeable for peripheral nerve blocks with surgery.    Current pain location(s): Pain Score: 0  Pain Location/Orientation: Orientation: Bilateral, Location: Foot  Pain Scale: Pain Assessment Tool: 0-10      Pain History: Chronic pain secondary to metastatic cancer.  Pain Management Physician: [facility] palliative care  I have reviewed the patient's controlled substance dispensing history in the Prescription Drug Monitoring Program in compliance with the [facility] regulations before prescribing any controlled substances.     Review of Systems   Constitutional:  Positive for activity change.   Neurological:  Positive for numbness.   All other systems reviewed and are negative.    I have reviewed the patient's PMH, PSH, Social History, Family History, Meds, and Allergies    Objective :  Temp:  [98 °F (36.7 °C)-98.3 °F (36.8 °C)] 98.3 °F (36.8 °C)  HR:  [80-84] 80  BP: (116-130)/(58-94) 130/94  Resp:  [16-18] 16  SpO2:  [92 %-96 %] 92 %  O2 Device: None (Room air)    Physical Exam  Vitals reviewed.   HENT:      Head: Normocephalic and atraumatic.   Cardiovascular:      Rate and Rhythm: Normal rate.   Pulmonary:      Effort: Pulmonary effort is normal.   Abdominal:      General: There is no distension.      Tenderness: There is no abdominal tenderness.   Musculoskeletal:         General: Tenderness (RLE) present.      Cervical back: Normal range of motion.   Skin:     General: Skin is warm and dry.   Neurological:      Mental Status: He is alert and oriented to person, place, and time. Mental status is at baseline.   Psychiatric:         Mood and Affect: Mood  normal.         Behavior: Behavior normal.          Lab Results: I have reviewed the following results:  Estimated Creatinine Clearance: 98 mL/min (by C-G formula based on SCr of 0.7 mg/dL).  Lab Results   Component Value Date    WBC 12.30 (H) 01/07/2025    HGB 8.5 (L) 01/07/2025    HCT 27.4 (L) 01/07/2025     (H) 01/07/2025         Component Value Date/Time    K 3.8 01/07/2025 0505     01/07/2025 0505    CO2 23 01/07/2025 0505    BUN 11 01/07/2025 0505    CREATININE 0.70 01/07/2025 0505         Component Value Date/Time    CALCIUM 8.5 01/07/2025 0505    ALKPHOS 103 01/07/2025 0505    AST 48 (H) 01/07/2025 0505     (H) 01/07/2025 0505    TP 6.2 (L) 01/07/2025 0505    ALB 3.2 (L) 01/07/2025 0505       Imaging Results Review: I reviewed radiology reports from this admission including: xray(s).  Other Study Results Review: No additional pertinent studies reviewed.

## 2025-01-07 NOTE — PROGRESS NOTES
[facility] Podiatry - Progress Note  Patient: Royce Rene 79 y.o. male   MRN: [id number]  PCP: Anne Chandra MD  [facility] Encounter: [id number]  Date Of Visit: 01/07/25    ASSESSMENT:    Royce Rene is a 79 y.o. male with:    Right forefoot wet gangrene, stable, Poole 4  Left foot dry gangrene, Poole stage 4  Ischemic pain to bilateral lower extremities  Ulceration left lateral midfoot   Severe peripheral vascular disease      PLAN:    Patient seen and evaluated bedside, of note right foot gangrene is continuing to demarcate and is turning wet at the level of the toes and ball of the foot, serous drainage.  In the left foot ulceration is continuing to demarcate in terms of gangrene.  Currently stable.  Ischemic pain noted to bilateral lower extremities.  Plan to continue local wound care bilaterally, vascular surgery is planning left lower extremity angiogram today.  Right leg is palliative versus amputation.  Had a discussion with patient regarding the right foot as it is turning into wet gangrene and will likely become infected in due time.  Patient relates that he does not walk and mainly uses his feet for transfer between a bed and a wheelchair.  Patient understands that his right lower extremity is has no options for revascularization and there are no options for foot based limb salvage.  Patient relays that he is open to the idea of a proximal amputation.  I reached out to vascular surgery and alerted them to this. [facility] planning AKA in coming day(s). Patient being placed on Zosyn.  Patient will be undergoing left lower extremity arteriogram today.  Will follow-up on this  Vitals are stable with leukocytosis noted  Continue local wound care, appreciate nursing assistance with dressing changes.  Elevation on green foam wedges or pillows when non-ambulatory.  Rest of care per primary team.    Weight bearing status: Weightbearing as tolerated      SUBJECTIVE:     The patient was seen, evaluated,  and assessed at bedside today. The patient was awake, alert, and in no acute distress. No acute events overnight. The patient reports pain to bilateral lower extremities.  Patient feels otherwise well and is awaiting his lower extremity angiogram today. Patient denies N/V/F/chills/SOB/CP.      OBJECTIVE:     Vitals:   /58   Pulse 83   Temp 98.3 °F (36.8 °C)   Resp 18   SpO2 94%     Temp (24hrs), Av.2 °F (36.8 °C), Min:98 °F (36.7 °C), Max:98.3 °F (36.8 °C)      Physical Exam:     General:  Alert, cooperative, and in no distress.  Lower extremity exam:  Cardiovascular status at baseline.  Neurological status at baseline.  Musculoskeletal status at baseline. No calf tenderness noted.     Bilateral lower extremities are cool to the touch with right greater than left.    Lower extremity wound(s) as noted below:    Wet gangrenous changes noted to the right forefoot at the level of the toes and dorsal forefoot.  Hallux is largely dry however toes 2 through 4 exhibit dusky changes with weeping from the toes and an odor.  No erythema noted.    Left lower extremity dry forefoot eschars noted to the hallux and second digit as well as the lateral midfoot.  No signs of soft tissue infection at this time.        Clinical Images 25:                Additional Data:     Labs:    Results from last 7 days   Lab Units 25  0005   WBC Thousand/uL 12.30*   HEMOGLOBIN g/dL 8.5*   HEMATOCRIT % 27.4*   PLATELETS Thousands/uL 493*   SEGS PCT % 82*   LYMPHO PCT % 7*   MONO PCT % 7   EOS PCT % 2     Results from last 7 days   Lab Units 25  0505   POTASSIUM mmol/L 3.8   CHLORIDE mmol/L 106   CO2 mmol/L 23   BUN mg/dL 11   CREATININE mg/dL 0.70   CALCIUM mg/dL 8.5   ALK PHOS U/L 103   ALT U/L 101*   AST U/L 48*     Results from last 7 days   Lab Units 25  0453   INR  1.10       * I Have Reviewed All Lab Data Listed Above.    Recent Cultures (last 7 days):                   ** Please Note: Portions of the  "record may have been created with voice recognition software. Occasional wrong word or \"sound a like\" substitutions may have occurred due to the inherent limitations of voice recognition software. Read the chart carefully and recognize, using context, where substitutions have occurred. **    "

## 2025-01-07 NOTE — BRIEF OP NOTE (RAD/CATH)
INTERVENTIONAL RADIOLOGY PROCEDURE NOTE    Date: 1/7/2025    Procedure:   Procedure Summary       Date: 01/07/25 Room / Location: [facility] Interventional Radiology    Anesthesia Start: 1042 Anesthesia Stop: 1244    Procedure: IR LOWER EXTREMITY ANGIOGRAM Diagnosis: (PAD, LLE CFA/SFA occlusion, b/l LE wounds)    Scheduled Providers: Zamzam Armenta MD Responsible Provider: Zamzam Armenta MD    Anesthesia Type: IV sedation with anesthesia ASA Status: 4            Preoperative diagnosis:   1. Ulcer of left foot, limited to breakdown of skin (HCC)    2. Ischemic cardiomyopathy    3. S/P AVR    4. Chronic combined systolic and diastolic CHF (congestive heart failure) ([facility])    5. PAD (peripheral artery disease) ([facility])         Postoperative diagnosis: Same.    Surgeon: Gavin Sykes MD     Assistant: None. No qualified resident was available.    Blood loss: 10 ml    Specimens: none.     Findings:   LLE arteriography showed calcific flow limiting atheroma in the L CFA and ostial L SFA.  In line flow provided by the PT with additional calcified atheroma through the TP and proximal PT.    6 mm lithotripsy of the L CFA / ostial L SFA  3 mm lithotripsy of the L TP trunk/proximal PT.    Starclose R CFA for closure.  70 ml contrast.    Complications: None immediate.    Anesthesia: MAC sedation      Vascular Quality Initiative - Peripheral Vascular Intervention     Urgency: Urgent    Functional Status:  Capable of only limited self-care, confined to bed or chair 50% or more of waking hours.   Ambulation: Amb w/ assistance = ambulation requires use of cane, walker, person, etc    Leg Symptoms    Right: Ulcer/necrosis (gangrene): de alfredo tissue loss due to peripheral arterial disease, not due to non-healing prior amputation       Tissue Loss Severity: Grade 3, Extensive = extensive deep ulcer or necrosis (gangrene) of the forefoot and/or midfoot with exposed bone, joint or tendon, or full-thickness  heel ulcer with or without involvement of the calcaneus (ie, extensive tissue loss: salvageable only with complex foot reconstruction or nontraditional TMA [eg, Chopart or Lisfranc amputation]).     Infection: Grade 0, None = No symptoms or signs of infection.  Left: Ulcer/necrosis (gangrene): de alfredo tissue loss due to peripheral arterial disease, not due to non-healing prior amputation       Tissue Loss Severity: Grade 1, Shallow = small shallow ulcer(s) on distal leg or foot, any exposed bone is only limited to distal phalanx (ie, minor tissue loss: limb salvage possible with simple digital amputation [1 or 2 digits], or skin coverage).     Infection: Grade 0, None = No symptoms or signs of infection.    COVID Information  COVID Symptoms Pre-Procedure: Asymptomatic    Treatment Delayed by Pandemic: None    Access   Number of Sites: 1     Access Site 1:     Side 1: Right    Site 1: Femoral Retrograde    Access Guidance 1:U/S    Largest Sheath Size 1: 6 Fr.    Closure Device 1: StarClose      Number of Closure Devices: 1     Closure Device Outcome: Closure device successful         Procedure  Fluoro Time: 18 minutes  Contrast Volume: Visipaque 70 ml  DAP: 87 Gy.cm2  CO2: no  Anticoagulant: Heparin  Protamine: No  If Creatinine is > 1.2 or missing, ZULEIKA Prophylaxis none     Treatment Details  Indication: Occlusive Disease,    Completion Assessment  Artery 1 treated:  Com Fem  Left          Outflow: SFA, PROF, POP: 1                  Was this Site previously treated?: No          TASC Grade: A          Total Treated Length: 6 cm          Total Occluded Length: 0 cm          Calcification: Severe (calcification on both sides of artery > half length of lesion)          Number of Treatment types (Devices):   1           Device 1          Treatment Type: Special Balloon,  Lithoplasty Balloon                Diameter: 6 mm          Length: 60 mm          Concomitant: None          Technical result: Successful (stenosis  <=30%)      Artery 2 treated: TP Trunk  Left                  Was this Site previously treated?: No          TASC Grade: A          Total Treated Length: 8 cm           Total Occluded Length: 0 cm          Calcification: Severe (calcification on both sides of artery > half length of lesion)          Number of Treatment types (Devices):   1           Device 1          Treatment Type: Special Balloon,  Lithoplasty Balloon                Diameter: 3 mm          Length: 80 mm          Concomitant: None          Technical result: Successful (stenosis <=30%)       None     Post Procedure  Patient currently taking: Statin, Yes      Antiplatelet Medication, Yes    Procedure Complications: No       Never smoker

## 2025-01-07 NOTE — ANESTHESIA POSTPROCEDURE EVALUATION
Post-Op Assessment Note    CV Status:  Stable  Pain Score: 0    Pain management: adequate       Mental Status:  Alert and awake   Hydration Status:  Euvolemic   PONV Controlled:  Controlled   Airway Patency:  Patent     Post Op Vitals Reviewed: Yes    No anethesia notable event occurred.    Staff: CRNA   Comments: Pt awake, alert, able to maintain own airway, VSS, report to recovery RN          Last Filed PACU Vitals:  Vitals Value Taken Time   Temp     Pulse 98    /63    Resp 14    SpO2 95% RA

## 2025-01-07 NOTE — ASSESSMENT & PLAN NOTE
Patient follows with palliative care outpatient for symptom management of metastatic prostate cancer.    Outpatient analgesic regimen includes:  Acetaminophen 1000 mg 3 times daily as needed  Gabapentin 600 mg 2 times daily  Methocarbamol 500 mg 3 times daily  Oxycodone 5 mg 2 times daily as needed

## 2025-01-07 NOTE — PROGRESS NOTES
Progress Note - Vascular Surgery   Name: Royce Rene 79 y.o. male I MRN: [id number]  Unit/Bed#: [facility] 523-01 I Date of Admission: 1/1/2025   Date of Service: 1/7/2025 I Hospital Day: 6     Assessment & Plan  PAD (peripheral artery disease) (HCC)  -Plan for L leg arteriogram today with IR  -Worsening demarcation to R foot compared to exam on 1/6, more drainage and breakdown of wounds on toes concerning for wet gangrene  -Discussed with pt the need for AKA to prevent worsening wounds/systemic infection  -Obtain consent pending for OR tomorrow with Dr. Zamora or Dr. Madison  -Placed on IV Zosyn  -Trend WBC and monitor for signs of systemic infxn or pain  -Continue ASA, Plavix, statin  -Continue to hold losartan, Jardiance, lasix for OR  -Cardiology consult for pre-op  -APS consult for pain control post-op      Findings consistent with images taken from media today, 1/7.      24 Hour Events : RN and Podiatry noticed worsening condition of R foot. No further complaints from pt, wound dressings remain CDI.    Subjective : Vascular team was secure chatted from Podiatry for concerns of R foot converting to wet gangrene today.  Pt denies any change in pain or decreased sensation in foot.  Pt prefers to go for L leg arteriogram today because he is concerned about preventing worsening condition to his L leg. Pt understands worsening condition to R leg, and is amenable to R AKA, consent needed and OR scheduling. Pt transported to IR for L a-gram this morning.     Objective :  Temp:  [98 °F (36.7 °C)-98.3 °F (36.8 °C)] 98.3 °F (36.8 °C)  HR:  [83] 83  BP: (116-119)/(58-59) 119/58  Resp:  [16-18] 18  SpO2:  [94 %-96 %] 94 %  O2 Device: EtCO2 mask     I/O         01/05 0701 01/06 0700 01/06 0701 01/07 0700 01/07 0701 01/08 0700    P.O. 180 420 0    I.V. (mL/kg) 600 (6)      Total Intake(mL/kg) 780 (7.8) 420 (4.2) 0 (0)    Urine (mL/kg/hr) 1200 (0.5) 1450 (0.6) 275 (0.7)    Stool  0     Total Output 1200 1450 275    Net -420 -1030  "-275           Unmeasured Stool Occurrence  1 x           Lines/Drains/Airways       Active Status       Name Placement date Placement time Site Days    Urethral Catheter Straight-tip;Non-latex 16 Fr. 01/02/25  1611  Straight-tip;Non-latex  4                  Physical Exam  Constitutional:       General: He is not in acute distress.  HENT:      Head: Normocephalic.   Cardiovascular:      Comments: No palp DP or PT pulses on R foot.  Pulmonary:      Effort: Pulmonary effort is normal.   Musculoskeletal:      Right foot: Decreased capillary refill. Swelling present.      Comments: Right foot: Worsening demarcation and breakdown of toes, necrosis of first 4 toes with wet drainage extending to sulcus.  Left foot: Discoloration to distal toes and prior wound on lateral ankle.    Skin:     General: Skin is warm.      Comments: Scabbing noted to R lateral calf   Neurological:      Mental Status: He is alert and oriented to person, place, and time.             Lab Results: I have reviewed the following results:  CBC with diff:   Lab Results   Component Value Date    WBC 12.30 (H) 01/07/2025    HGB 8.5 (L) 01/07/2025    HCT 27.4 (L) 01/07/2025    MCV 96 01/07/2025     (H) 01/07/2025    RBC 2.87 (L) 01/07/2025    MCH 29.6 01/07/2025    MCHC 31.0 (L) 01/07/2025    RDW 14.6 01/07/2025    MPV 9.6 01/07/2025    NRBC 0 01/07/2025   ,   BMP/CMP:  Lab Results   Component Value Date    SODIUM 137 01/07/2025    K 3.8 01/07/2025     01/07/2025    CO2 23 01/07/2025    BUN 11 01/07/2025    CREATININE 0.70 01/07/2025    CALCIUM 8.5 01/07/2025    AST 48 (H) 01/07/2025     (H) 01/07/2025    ALKPHOS 103 01/07/2025    EGFR 89 01/07/2025   ,   Lipid Panel: No results found for: \"CHOL\",   Coags:   Lab Results   Component Value Date    PTT 37 04/03/2023    INR 1.10 01/03/2025   ,   Blood Culture:   Lab Results   Component Value Date    BLOODCX No Growth After 5 Days. 04/03/2023   ,   Urinalysis:   Lab Results   Component " "Value Date    COLORU Yellow 01/18/2024    CLARITYU Clear 01/18/2024    SPECGRAV 1.020 01/18/2024    PHUR 5.5 01/18/2024    PHUR 5.5 02/14/2022    LEUKOCYTESUR Negative 01/18/2024    NITRITE Negative 01/18/2024    GLUCOSEU 3+ (A) 01/18/2024    KETONESU Negative 01/18/2024    BILIRUBINUR Negative 01/18/2024    BLOODU Negative 01/18/2024   ,   Urine Culture: No results found for: \"URINECX\",   Wound Culure:   Lab Results   Component Value Date    WOUNDCULT 1+ Growth of 12/12/2024       VTE Prophylaxis: Reason for no pharmacologic prophylaxis Held lovenox for IR a-gram 1/7      Kyle Paulino PA-C  01/07/2025  "

## 2025-01-07 NOTE — ASSESSMENT & PLAN NOTE
For Rt AKA  Patient denies angina or anginal equivalent and is without signs or symptoms of acute decompensated heart failure  Limited functional capacity due to bilateral lower extremity ulcers

## 2025-01-07 NOTE — ANESTHESIA PREPROCEDURE EVALUATION
Procedure:  IR LOWER EXTREMITY ANGIOGRAM    Relevant Problems   CARDIO   (+) CAD (coronary artery disease)   (+) Embolism and 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 (HCC)   (+) Ischemic pain of foot at rest   (+) Mixed hyperlipidemia   (+) PAD (peripheral artery disease) (HCC)      /RENAL   (+) Prostate cancer (HCC)      HEMATOLOGY   (+) Anemia   (+) Iron deficiency anemia      NEURO/PSYCH   (+) Depression, recurrent (HCC)   (+) Moderate vascular dementia ([facility])      Surgery/Wound/Pain   (+) S/P AVR      Orthopedic/Musculoskeletal   (+) Ischemic leg pain      Cardiovascular/Peripheral Vascular   (+) Ischemic cardiomyopathy      Other   (+) Osteomyelitis ([facility])   8/2/23   Left Ventricle: Left ventricular cavity size is normal. Wall thickness is increased. There is moderate concentric hypertrophy. The left ventricular ejection fraction is 30-35%. Systolic function is severely reduced. There is mid anteroseptal, mid inferoseptal, apical septal, apical lateral, apical inferior, apical anterior and apical akinesis with wall thinning, suggestive of aneurysmal changes. Diastolic function is mildly abnormal, consistent with grade I (abnormal) relaxation.  Left atrial filling pressure is elevated.  No definite LV apical thrombus seen.  Contrast not utilized.    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 The prosthetic valve appears well-seated. There is mild paravalvular regurgitation. The gradient recorded across the prosthetic aortic valve is within the expected range. The aortic valve peak velocity is 2.03 m/s. The aortic valve peak gradient is 16 mmHg. The aortic valve mean gradient is 9 mmHg. The aortic valve area is 1.07 cm2.    Mitral Valve: There is mild annular calcification. There is trace regurgitation.    Right Ventricle: Systolic function is normal.    Left Atrium: The atrium is mildly dilated (35-41 mL/m2).    Physical Exam    Airway  Comment: canchola  Mallampati score: II  TM Distance: >3 FB        Dental        Cardiovascular      Pulmonary      Other Findings        Anesthesia Plan  ASA Score- 4     Anesthesia Type- IV sedation with anesthesia with ASA Monitors.         Additional Monitors:     Airway Plan:            Plan Factors-Exercise tolerance (METS): <4 METS.    Chart reviewed. EKG reviewed. Imaging results reviewed. Existing labs reviewed. Patient summary reviewed.    Patient is not a current smoker.              Induction- intravenous.    Postoperative Plan-     Perioperative Resuscitation Plan - Level 1 - Full Code.       Informed Consent- Anesthetic plan and risks discussed with patient.  I personally reviewed this patient with the CRNA. Discussed and agreed on the Anesthesia Plan with the CRNA..

## 2025-01-07 NOTE — ASSESSMENT & PLAN NOTE
Patient with worsening wet gangrene RLE in the setting of underlying PAD  Evaluated by podiatry and vascular surgery with recommendation for AKA

## 2025-01-07 NOTE — PROGRESS NOTES
Interventional Radiology Preprocedure Note    History/Indication for procedure:   Royce Rene is a 79 y.o. male with a PMH of LLE CLTI w/nonhealing wounds who presents for LLE arteriography and intervention.    Relevant past medical history:    Past Medical History:   Diagnosis Date    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    Coronary artery disease 2001    S/p stenting to circumflex and RCA    HFrEF (heart failure with reduced ejection fraction) (HCC) 06/14/2021    High cholesterol     Pacemaker     Prostate cancer (HCC)      Patient Active Problem List   Diagnosis    Chronic combined systolic and diastolic CHF (congestive heart failure) (HCC)    S/P AVR    Anemia    Ischemic cardiomyopathy    Ischemic leg pain    Prostate cancer (HCC)    CAD (coronary artery disease)    Urinary retention    PAD (peripheral artery disease) (HCC)    Port-A-Cath in place    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)    Embolism and 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 (HCC)    Depression, recurrent (HCC)    Cancer-related pain    Palliative care patient    Ambulatory dysfunction    Peripheral neuropathy    Financial problems    Moderate vascular dementia (HCC)    Electrolyte imbalance    Class 1 obesity due to excess calories with body mass index (BMI) of 33.0 to 33.9 in adult    Elevated liver enzymes    Proctitis    Frail elder // vulnerable adult    Prevention of chemotherapy-induced neutropenia    Osteomyelitis (HCC)    Ischemic pain of foot at rest    Ulcer of right foot with fat layer exposed (HCC)    Ulcer of left foot, limited to breakdown of skin (HCC)    Dry gangrene (HCC)    Vitamin B12 deficiency    Mixed hyperlipidemia    Exudative age-related macular degeneration, left eye, with active choroidal neovascularization (HCC)    Facial lesion    Diabetic ulcer of toe of right foot associated with type 2 diabetes mellitus, with necrosis of bone (HCC)    Hyponatremia    Diarrhea    Right ankle pain    Iron deficiency  anemia    Hyperuricemia       /58   Pulse 83   Temp 98.3 °F (36.8 °C)   Resp 18   SpO2 94%     Medications:    Inpatient Medications:     Scheduled Medications:  Current Facility-Administered Medications   Medication Dose Route Frequency Provider Last Rate    acetaminophen  650 mg Oral Q6H PRN Tato Nixon MD      allopurinol  100 mg Oral Daily Tato Nixon MD      aspirin  81 mg Oral Daily Tato Nixon MD      atorvastatin  80 mg Oral Daily With Dinner Tato Nixon MD      bismuth subsalicylate  524 mg Oral Q6H PRN Tato Nixon MD      clopidogrel  75 mg Oral Daily Tato Nixon MD      Diclofenac Sodium  2 g Topical 4x Daily Tato Nixon MD      [Held by provider] Empagliflozin  10 mg Oral QAM Tato Nixon MD      enoxaparin  40 mg Subcutaneous Daily Tato Nixon MD      ezetimibe  10 mg Oral Daily Tato Nixon MD      famotidine  20 mg Oral Daily Tato Nixon MD      ferrous sulfate  325 mg Oral Daily Tato Nixon MD      finasteride  5 mg Oral Daily Tato Nixon MD      [Held by provider] furosemide  40 mg Oral Daily Tato Nixon MD      gabapentin  600 mg Oral BID Tato Nixon MD      HYDROmorphone  1 mg Intravenous Q3H PRN Tato Nixon MD      loperamide  2 mg Oral 4x Daily PRN Tato Nixon MD      [Held by provider] losartan  12.5 mg Oral Daily Tato Nixon MD      menthol-methyl salicylate   Apply externally 4x Daily PRN Tato Nixon MD      methocarbamol  500 mg Oral TID Tato Nixon MD      metoprolol succinate  25 mg Oral Daily Tato Nixon MD      naloxone  0.04 mg Intravenous Q1MIN PRN OMAIRA Dumont      ondansetron  4 mg Intravenous Q6H PRN Tato Nixon MD      oxyCODONE  5 mg Oral Q4H PRN Tato Nixon MD      Or    oxyCODONE  10 mg Oral  Q4H PRN Tato Nixon MD      pentoxifylline  400 mg Oral TID With Meals Tato Nixon MD      piperacillin-tazobactam  4.5 g Intravenous Once Davy Willoughby MD      Followed by    piperacillin-tazobactam  4.5 g Intravenous Q8H Davy Willoughby MD      potassium chloride  20 mEq Oral Daily Tato Nixon MD      predniSONE  5 mg Oral BID With Meals Tato Nixon MD      tamsulosin  0.4 mg Oral Daily With Dinner Tato Nixon MD         Infusions:       PRN:    acetaminophen    bismuth subsalicylate    HYDROmorphone    loperamide    menthol-methyl salicylate    naloxone    ondansetron    oxyCODONE **OR** oxyCODONE    Outpatient Medications:  No current facility-administered medications on file prior to encounter.     Current Outpatient Medications on File Prior to Encounter   Medication Sig Dispense Refill    Accu-Chek FastClix Lancets MISC       acetaminophen (TYLENOL) 500 mg tablet Take 2 tablets (1,000 mg total) by mouth 3 (three) times a day as needed for mild pain      Alcohol Swabs (Alcohol Pads) 70 % PADS USE 2-3 TIMES AS DIRECTED.      allopurinol (ZYLOPRIM) 100 mg tablet Take 1 tablet (100 mg total) by mouth daily 30 tablet 0    aspirin 81 mg chewable tablet Chew 1 tablet (81 mg total) daily 90 tablet 1    atorvastatin (LIPITOR) 80 mg tablet Take 1 tablet (80 mg total) by mouth daily with dinner 90 tablet 1    bismuth subsalicylate (PEPTO BISMOL) 262 MG chewable tablet Chew 524 mg As needed      Blood Glucose Monitoring Suppl (Accu-Chek Guide Me) w/Device KIT USE AS DIRECTED 1 kit 1    clopidogrel (PLAVIX) 75 mg tablet Take 1 tablet (75 mg total) by mouth daily 90 tablet 1    Empagliflozin (Jardiance) 10 MG TABS tablet TAKE 1 TABLET (10 MG TOTAL) BY MOUTH EVERY MORNING 30 tablet 5    ezetimibe (ZETIA) 10 mg tablet Take 1 tablet (10 mg total) by mouth daily 90 tablet 1    famotidine (PEPCID) 20 mg tablet Take 1 tablet (20 mg total) by mouth daily 30 tablet 2     ferrous sulfate 325 (65 Fe) mg tablet Take 1 tablet (325 mg total) by mouth daily 90 tablet 1    finasteride (PROSCAR) 5 mg tablet Take 1 tablet (5 mg total) by mouth daily 30 tablet 0    furosemide (LASIX) 20 mg tablet TAKE 2 TABLETS (40 MG TOTAL) BY MOUTH DAILY 60 tablet 5    gabapentin (NEURONTIN) 300 mg capsule Take 2 capsules (600 mg total) by mouth 2 (two) times a day 120 capsule 11    glucose blood (Accu-Chek Guide) test strip TEST 2-3 TIMES AS DIRECTED 100 each 5    Lancet Devices (Lancing Device) MISC  each 2    loperamide (IMODIUM) 2 mg capsule Take 1 capsule (2 mg total) by mouth 4 (four) times a day as needed for diarrhea 30 capsule 2    losartan (COZAAR) 25 mg tablet TAKE 0.5 TABLETS (12.5 MG TOTAL) BY MOUTH DAILY 45 tablet 1    methocarbamol (ROBAXIN) 500 mg tablet TAKE 1 TABLET (500 MG TOTAL) BY MOUTH 3 (THREE) TIMES A DAY 90 tablet 0    metoprolol succinate (TOPROL-XL) 25 mg 24 hr tablet Take 1 tablet (25 mg total) by mouth daily 90 tablet 2    ondansetron (ZOFRAN) 4 mg tablet Take 1 tablet (4 mg total) by mouth every 8 (eight) hours as needed for nausea or vomiting 20 tablet 1    oxyCODONE (Roxicodone) 5 immediate release tablet Take 1 tablet (5 mg total) by mouth 2 (two) times a day as needed (wound pain) Max Daily Amount: 10 mg 60 tablet 0    pentoxifylline (TRENtal) 400 mg ER tablet Take 1 tablet (400 mg total) by mouth 3 (three) times a day with meals 90 tablet 0    polyethylene glycol (GLYCOLAX) 17 GM/SCOOP powder Take 17 g by mouth every other day Monday Wednesday Friday 510 g 0    potassium chloride (Klor-Con M20) 20 mEq tablet TAKE 1 TABLET (20 MEQ TOTAL) BY MOUTH DAILY 30 tablet 5    predniSONE 5 mg tablet TAKE 1 TABLET (5 MG TOTAL) BY MOUTH 2 (TWO) TIMES A DAY WITH MEALS 60 tablet 5    tamsulosin (FLOMAX) 0.4 mg Take 1 capsule (0.4 mg total) by mouth daily with dinner 30 capsule 0       No Known Allergies    Anticoagulants: ASA    ASA classification: ASA 3 - Patient with moderate  systemic disease with functional limitations    Airway Assessment: III (soft and hard palate and base of uvula visible)    Relevant family history: None    Relevant review of systems: None    Prior sedation/anesthesia: yes    Can the patient lie flat? Yes     NPO Status: yes    Labs:   CBC with diff:   Lab Results   Component Value Date    WBC 12.30 (H) 01/07/2025    HGB 8.5 (L) 01/07/2025    HCT 27.4 (L) 01/07/2025    MCV 96 01/07/2025     (H) 01/07/2025    RBC 2.87 (L) 01/07/2025    MCH 29.6 01/07/2025    MCHC 31.0 (L) 01/07/2025    RDW 14.6 01/07/2025    MPV 9.6 01/07/2025    NRBC 0 01/07/2025     BMP/CMP:  Lab Results   Component Value Date    K 3.8 01/07/2025     01/07/2025    CO2 23 01/07/2025    BUN 11 01/07/2025    CREATININE 0.70 01/07/2025    CALCIUM 8.5 01/07/2025    AST 48 (H) 01/07/2025     (H) 01/07/2025    ALKPHOS 103 01/07/2025    EGFR 89 01/07/2025   ,     Coags:   Lab Results   Component Value Date    PTT 37 04/03/2023    INR 1.10 01/03/2025   ,   Results from last 7 days   Lab Units 01/03/25  0453   INR  1.10        Relevant imaging studies:   Reviewed.    Directed physical examination:  I agree with the physical exam performed on 1/6/24 and there are no additional changes.    Assessment/Plan:   LLE arteriography and intervention.    Sedation/Anesthesia plan:  MAC sedation will be used as needed for procedure.    Consent with alternatives to the procedure, risks and benefits have been explained and discussed with the patient/patient's family: yes.

## 2025-01-07 NOTE — DISCHARGE INSTRUCTIONS
ARTERIOGRAM    WHAT YOU SHOULD KNOW:   An angiogram is a procedure to look at arteries in your body. Arteries are the blood vessels that carry blood from your heart to your body.     AFTER YOU LEAVE:     Self-care:   Limit activity: Rest for the remainder of the day of your procedure.Have some one with you until the next morning. Keep your arm or leg straight as much as possible.Rest as much as possible, sitting lying or reclining. Walk only to go to the bathroom, to bed or to eat. If the angiogram catheter was put in your leg, use the stairs as little as possible. No driving for 24-48 hours. No heavy lifting, >10 lbs. Or strenuous activity for 48 hours.      Keep your wound clean and dry. Remove band aid/ dressing tomorrow. You may shower 24 hours after your procedure. Shower and wash groin area or wrist area gently with soap and water: beginning tomorrow. Rinse and pat Dry. Apply new water seal band aid. Repeat this process for 5 days.  If there is any drainage from the puncture site, you should put on a clean bandage. No Powders, creams, lotions or antibiotic ointments for 5 days.  No tub baths, hot tubs or swimming for 5 days.    Watch for bleeding and bruising: It is normal to have a bruise and soreness where the angiogram catheter went in.  Medication: If your angiogram was performed to treat blockages in your leg arteries, it is strongly recommended that you take both an antiplatelet medication (like aspirin or Plavix) to prevent clotting AND a statin drug (like Lipitor or Crestor), even if you have normal cholesterol. If these drugs are not ordered for you please contact either your Vascular Surgery office or the Interventional Radiology Dept during normal daytime working hours. See Interventional Radiology telephone numbers below.  You Should Have Follow up with the vascular surgeon   call [phone] with questions  Diet:   You may resume your regular diet, Sips of flat soda will help with mild  nausea.  Drink more liquids than usual for the next 24 hours      IMMEDIATELY Contact Interventional Radiology at [phone]  if any of the following occur:  If your bruise gets larger or if you notice any active bleeding. APPLY DIRECT PRESSURE TO THE BLEEDING SITE.   If you notice increased swelling or have increased pain at the puncture site   If you have any numbness or pain in the extremity of the puncture site   If that extremity seems cold or pale.    You have fever greater than 101  Persistent nausea or vomitting    Follow up with your primary healthcare provider  as directed: Write down your questions so you remember to ask them during your visits.

## 2025-01-07 NOTE — ANESTHESIA POSTPROCEDURE EVALUATION
Post-Op Assessment Note            No anethesia notable event occurred.            Last Filed PACU Vitals:  Vitals Value Taken Time   Temp     Pulse 88 01/07/25 1434   /64 01/07/25 1422   Resp     SpO2 93 % 01/07/25 1434   Vitals shown include unfiled device data.

## 2025-01-07 NOTE — ASSESSMENT & PLAN NOTE
Patient with history of PAD and right foot gangrene  Planned for right AKA with vascular surgery 1/8/2025  Patient would be agreeable to peripheral nerve blocks for postoperative analgesia    Multimodal analgesia:  Change as needed Tylenol to 975 mg every 8 hours scheduled  Gabapentin 600 mg 2 times daily, home medication  Estimated Creatinine Clearance: 98 mL/min (by C-G formula based on SCr of 0.7 mg/dL).  Robaxin 500 mg every 8 hours, home medication  Start venlafaxine 25 mg daily as second line agent for neuropathic pain  Oxycodone 5 mg every 4 hours as needed for moderate pain  Oxycodone 10 mg every 4 hours as needed for severe pain  Decrease IV dilaudid to 0.2 mg every 4 hours as needed for breakthrough pain  Narcan as needed for respiratory depression/opioid reversal

## 2025-01-07 NOTE — PHYSICAL THERAPY NOTE
Physical Therapy Cancellation Note               01/07/25 1146   PT Last Visit   PT Visit Date 01/07/25   Note Type   Note Type Cancelled Session   Cancel Reasons Patient off floor/test     PT attempted treatment session, spoke to RN; patient currently off the floor at IR. PT will continue to follow as able and appropriate.     Kyra Ross PT, DPT

## 2025-01-07 NOTE — ASSESSMENT & PLAN NOTE
-Plan for L leg arteriogram today with IR  -Worsening demarcation to R foot compared to exam on 1/6, more drainage and breakdown of wounds on toes concerning for wet gangrene  -Discussed with pt the need for AKA to prevent worsening wounds/systemic infection  -Obtain consent pending for OR tomorrow with Dr. Zamora or Dr. Madison  -Placed on IV Zosyn  -Trend WBC and monitor for signs of systemic infxn or pain  -Continue ASA, Plavix, statin  -Continue to hold losartan, Jardiance, lasix for OR  -Cardiology consult for pre-op  -APS consult for pain control post-op      Findings consistent with images taken from media today, 1/7.

## 2025-01-07 NOTE — QUICK NOTE
Vascular Surgery    Post-op Check:    S/P LLE Agram, shockwave CFA, SFA, TPT, PT POD#0    Pt resting in room, eating dinner    AVSS    Right groin puncture CDI, no hematoma, no bleeding    Left foot warm, M/S intact    Left DP/PT signals

## 2025-01-07 NOTE — ASSESSMENT & PLAN NOTE
Follows with Dr. Hernandez  Current regimen is Cabazitaxel, Neulasta, prednisone, Lupron, denosumab

## 2025-01-07 NOTE — SEDATION DOCUMENTATION
LLE angiogram with intervention performed by Dr Sykes without complication. Patient tolerated procedure well. Anesthesia present throughout case. IR Procedure Bedrest Start Time is 1230 x 4 hours. Knee immobilizer to RLE. Report called to primary nurse.

## 2025-01-07 NOTE — PLAN OF CARE
Problem: PAIN - ADULT  Goal: Verbalizes/displays adequate comfort level or baseline comfort level  Description: Interventions:  - Encourage patient to monitor pain and request assistance  - Assess pain using appropriate pain scale  - Administer analgesics based on type and severity of pain and evaluate response  - Implement non-pharmacological measures as appropriate and evaluate response  - Consider cultural and social influences on pain and pain management  - Notify physician/advanced practitioner if interventions unsuccessful or patient reports new pain  Outcome: Progressing     Problem: INFECTION - ADULT  Goal: Absence or prevention of progression during hospitalization  Description: INTERVENTIONS:  - Assess and monitor for signs and symptoms of infection  - Monitor lab/diagnostic results  - Monitor all insertion sites, i.e. indwelling lines, tubes, and drains  - Monitor endotracheal if appropriate and nasal secretions for changes in amount and color  - [place] appropriate cooling/warming therapies per order  - Administer medications as ordered  - Instruct and encourage patient and family to use good hand hygiene technique  - Identify and instruct in appropriate isolation precautions for identified infection/condition  Outcome: Progressing  Goal: Absence of fever/infection during neutropenic period  Description: INTERVENTIONS:  - Monitor WBC    Outcome: Progressing     Problem: SAFETY ADULT  Goal: Patient will remain free of falls  Description: INTERVENTIONS:  - Educate patient/family on patient safety including physical limitations  - Instruct patient to call for assistance with activity   - Consult OT/PT to assist with strengthening/mobility   - Keep Call bell within reach  - Keep bed low and locked with side rails adjusted as appropriate  - Keep care items and personal belongings within reach  - Initiate and maintain comfort rounds  - Make Fall Risk Sign visible to staff  - Offer Toileting every 2 Hours,  in advance of need  - Initiate/Maintain bed alarm  - Obtain necessary fall risk management equipment: alarmed, yellow bracelet  - Apply yellow socks and bracelet for high fall risk patients  - Consider moving patient to room near nurses station  Outcome: Progressing  Goal: Maintain or return to baseline ADL function  Description: INTERVENTIONS:  -  Assess patient's ability to carry out ADLs; assess patient's baseline for ADL function and identify physical deficits which impact ability to perform ADLs (bathing, care of mouth/teeth, toileting, grooming, dressing, etc.)  - Assess/evaluate cause of self-care deficits   - Assess range of motion  - Assess patient's mobility; develop plan if impaired  - Assess patient's need for assistive devices and provide as appropriate  - Encourage maximum independence but intervene and supervise when necessary  - Involve family in performance of ADLs  - Assess for home care needs following discharge   - Consider OT consult to assist with ADL evaluation and planning for discharge  - Provide patient education as appropriate  Outcome: Progressing  Goal: Maintains/Returns to pre admission functional level  Description: INTERVENTIONS:  - Perform AM-PAC 6 Click Basic Mobility/ Daily Activity assessment daily.  - Set and communicate daily mobility goal to care team and patient/family/caregiver.   - Collaborate with rehabilitation services on mobility goals if consulted  - Perform Range of Motion 3 times a day.  - Reposition patient every 2 hours.  - Dangle patient 3 times a day  - Stand patient 3 times a day  - Ambulate patient 3 times a day  - Out of bed to chair 3 times a day   - Out of bed for meals 3 times a day  - Out of bed for toileting  - Record patient progress and toleration of activity level   Outcome: Progressing     Problem: DISCHARGE PLANNING  Goal: Discharge to home or other facility with appropriate resources  Description: INTERVENTIONS:  - Identify barriers to discharge  w/patient and caregiver  - Arrange for needed discharge resources and transportation as appropriate  - Identify discharge learning needs (meds, wound care, etc.)  - Arrange for interpretive services to assist at discharge as needed  - Refer to Case Management Department for coordinating discharge planning if the patient needs post-hospital services based on physician/advanced practitioner order or complex needs related to functional status, cognitive ability, or social support system  Outcome: Progressing     Problem: Knowledge Deficit  Goal: Patient/family/caregiver demonstrates understanding of disease process, treatment plan, medications, and discharge instructions  Description: Complete learning assessment and assess knowledge base.  Interventions:  - Provide teaching at level of understanding  - Provide teaching via preferred learning methods  Outcome: Progressing     Problem: Prexisting or High Potential for Compromised Skin Integrity  Goal: Skin integrity is maintained or improved  Description: INTERVENTIONS:  - Identify patients at risk for skin breakdown  - Assess and monitor skin integrity  - Assess and monitor nutrition and hydration status  - Monitor labs   - Assess for incontinence   - Turn and reposition patient  - Assist with mobility/ambulation  - Relieve pressure over bony prominences  - Avoid friction and shearing  - Provide appropriate hygiene as needed including keeping skin clean and dry  - Evaluate need for skin moisturizer/barrier cream  - Collaborate with interdisciplinary team   - Patient/family teaching  - Consider wound care consult   Outcome: Progressing     Problem: Nutrition/Hydration-ADULT  Goal: Nutrient/Hydration intake appropriate for improving, restoring or maintaining nutritional needs  Description: Monitor and assess patient's nutrition/hydration status for malnutrition. Collaborate with interdisciplinary team and initiate plan and interventions as ordered.  Monitor patient's  weight and dietary intake as ordered or per policy. Utilize nutrition screening tool and intervene as necessary. Determine patient's food preferences and provide high-protein, high-caloric foods as appropriate.     INTERVENTIONS:  - Monitor oral intake, urinary output, labs, and treatment plans  - Assess nutrition and hydration status and recommend course of action  - Evaluate amount of meals eaten  - Assist patient with eating if necessary   - Allow adequate time for meals  - Recommend/ encourage appropriate diets, oral nutritional supplements, and vitamin/mineral supplements  - Order, calculate, and assess calorie counts as needed  - Recommend, monitor, and adjust tube feedings and TPN/PPN based on assessed needs  - Assess need for intravenous fluids  - Provide specific nutrition/hydration education as appropriate  - Include patient/family/caregiver in decisions related to nutrition  Outcome: Progressing

## 2025-01-07 NOTE — CONSULTS
Consultation - Cardiology Team One  Royce Rene 79 y.o. male MRN: [id number]  Unit/Bed#: [facility] 523-01 Encounter: [id number]    Inpatient consult to Cardiology  Consult performed by: Yadira Mack PA-C  Consult ordered by: Davy Willoughby MD          Physician Requesting Consult: Kelli Pennington MD  Reason for Consult / Principal Problem: Preoperative cardiac risk assessment    Assessment & Plan  Encounter for preoperative assessment for noncoronary cardiac surgery  For Rt AKA  Patient denies angina or anginal equivalent and is without signs or symptoms of acute decompensated heart failure  Limited functional capacity due to bilateral lower extremity ulcers  PAD (peripheral artery disease) ([facility])  Patient with worsening wet gangrene RLE in the setting of underlying PAD  Evaluated by podiatry and vascular surgery with recommendation for AKA  Chronic combined systolic and diastolic CHF (congestive heart failure) ([facility])  Echocardiogram 8/2/2023: EF 30-35% with RWMA, G1 DD, normal RV function, TAVR with normal function.  No significant change when compared to the echocardiogram from 11/2021.  Home diuretic regimen: Lasix 40 mg daily  Volume status appears stable  Ischemic cardiomyopathy  S/p SJM BiV ICD  CAD (coronary artery disease)   s/p RCA/LCx stenting in 2001  History of transcatheter aortic valve replacement (TAVR)  s/p TAVR in 2021 at [facility]  Echocardiogram 8/2023 with normally functioning TAVR  Mixed hyperlipidemia  Lipid panel 7/2024 with , , HDL 51,  atorvastatin 80 mg  Prostate cancer ([facility])  Follows with Dr. Hernandez  Current regimen is Cabazitaxel, Neulasta, prednisone, Lupron, denosumab    Plan/Recommendations:  Patient elevated but acceptable risk to proceed with surgery without additional testing that would modify this risk  Continue beta-blocker perioperatively  Restart GDMT postoperatively as able  Please await attending attestation for final  recommendations  ___________________________________________________________    CC: Diarrhea      History of Present Illness   HPI: Royce Rene is a 79 y.o. year old male who has chronic HFrEF, ICM s/p SJM BiV ICD, CAD  s/p RCA/LCx stenting in 2001, severe AS s/p TAVR in 2021 at [facility], [place] noted on device interrogations treated with ATP, history of atrial tachycardia s/p ablation in 2018 at [facility], PAD with RLE ischemia s/p femoral endarterectomy and SFA stenting with gangrene, hyperlipidemia who follows with cardiologist Dr. Trejo.  Patient presents to the emergency room at [facility] on 12/26/2024 his last echo was a year with fever, chills, nausea, vomiting and diarrhea secondary to chemotherapy.  Given ongoing gangrene of the right foot patient was eventually transferred to [facility] for vascular evaluation who recommended AKA.  Radiology has been consulted for preoperative cardiac risk assessment.    Home medication regimen includes aspirin 81 mg daily, atorvastatin 80 mg daily, Plavix 75 mg daily, Jardiance 10 mg daily, Zetia 10 mg daily, Lasix 40 mg daily, losartan 12.5 mg daily, Toprol-XL 25 mg daily.    Patient lying flat in bed and denies any chest pain, shortness of breath or palpitation.  He is unable to ambulate for the last 3 years and is mostly wheelchair-bound.  He gets his meals through [employer].  His son helps him with getting to appointments.      Device interrogation 5/23/2024: SJM BiV ICD (MRI conditional).  AP 48%, by  98%, 1 [place] episodes successful ATP therapy delivered. <1% AF burden.  Normal device function.    Echocardiogram 8/2/2023: EF 30-35% with RWMA, G1 DD, normal RV function, TAVR with normal function.  No significant change when compared to the echocardiogram from 11/2021.    EKG reviewed personally: 1/3/2025-ventricular paced rhythm at 84 bpm.      Telemetry reviewed personally: Not on telemetry        Review of Systems   Constitutional: Negative. Negative for  chills.   Cardiovascular:  Negative for chest pain, dyspnea on exertion, leg swelling, near-syncope, orthopnea, palpitations, paroxysmal nocturnal dyspnea and syncope.   Respiratory: Negative.  Negative for cough, shortness of breath and wheezing.    Endocrine: Negative.    Hematologic/Lymphatic: Negative.    Skin:  Positive for poor wound healing.   Musculoskeletal: Negative.    Gastrointestinal: Negative.  Negative for diarrhea, nausea and vomiting.   Neurological:  Negative for dizziness, light-headedness and weakness.   Psychiatric/Behavioral: Negative.  Negative for altered mental status.    All other systems reviewed and are negative.    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    Coronary artery disease     S/p stenting to circumflex and RCA    HFrEF (heart failure with reduced ejection fraction) ([facility]) 2021    High cholesterol     Pacemaker     Prostate cancer ([facility])      Past Surgical History:   Procedure Laterality Date    CARDIAC CATHETERIZATION      CARDIAC ELECTROPHYSIOLOGY PROCEDURE N/A 2021    Procedure: Implant ICD - bi ventricular;  Surgeon: Jonas Oviedo MD;  Location: [facility] CARDIAC CATH LAB;  Service: Cardiology    COLONOSCOPY      IR LOWER EXTREMITY ANGIOGRAM  2023    IR LOWER EXTREMITY ANGIOGRAM  10/30/2024    AL TEAEC W/WO PATCH GRAFT COMMON FEMORAL Right 2021    Procedure: ENDARTERECTOMY ARTERIAL FEMORAL;  Surgeon: Serina Cook DO;  Location: [facility];  Service: Vascular     Social History     Substance and Sexual Activity   Alcohol Use Not Currently     Social History     Substance and Sexual Activity   Drug Use Not Currently     Social History     Tobacco Use   Smoking Status Former    Current packs/day: 0.00    Types: Cigarettes    Start date: 1962    Quit date: 2002    Years since quittin.5    Passive exposure: Past   Smokeless Tobacco Never     Family History:   Family History    Problem Relation Age of Onset    Cancer Mother        Meds/Allergies   all current active meds have been reviewed       No Known Allergies    Objective   Vitals: Blood pressure 119/58, pulse 83, temperature 98.3 °F (36.8 °C), resp. rate 18, SpO2 94%.,     There is no height or weight on file to calculate BMI.,     Systolic (24hrs), Av , Min:116 , Max:119     Diastolic (24hrs), Av, Min:58, Max:59    Wt Readings from Last 3 Encounters:   24 100 kg (220 lb 14.4 oz)   24 103 kg (227 lb)   24 103 kg (227 lb)      Lab Results   Component Value Date    CREATININE 0.70 2025    CREATININE 0.68 2025    CREATININE 0.68 2025         Intake/Output Summary (Last 24 hours) at 2025 1133  Last data filed at 2025 1126  Gross per 24 hour   Intake 100 ml   Output 1725 ml   Net -1625 ml     Weight (last 2 days)       None          Invasive Devices       Central Venous Catheter Line  Duration             Port A Cath 10/28/20 Right Chest 1532 days              Peripheral Intravenous Line  Duration             Peripheral IV 25 Right;Ventral (anterior);Medial Antecubital <1 day              Drain  Duration             Urethral Catheter Straight-tip;Non-latex 16 Fr. 4 days                      Physical Exam  Vitals and nursing note reviewed.   Constitutional:       General: He is not in acute distress.     Appearance: He is well-developed. He is obese.      Comments: On RA in NAD   HENT:      Head: Normocephalic and atraumatic.   Neck:      Vascular: No JVD.   Cardiovascular:      Rate and Rhythm: Normal rate and regular rhythm.      Heart sounds: Normal heart sounds. No murmur heard.     No friction rub. No gallop.   Pulmonary:      Effort: Pulmonary effort is normal. No respiratory distress.      Breath sounds: Normal breath sounds. No wheezing or rales.   Chest:      Chest wall: No tenderness.   Abdominal:      General: Bowel sounds are normal. There is no distension.       Palpations: Abdomen is soft.      Tenderness: There is no abdominal tenderness.   Musculoskeletal:         General: No tenderness. Normal range of motion.      Cervical back: Normal range of motion and neck supple.      Right lower leg: No edema.      Left lower leg: No edema.   Skin:     General: Skin is warm and dry.      Coloration: Skin is not pale.      Findings: No erythema.      Comments: R foot gangrene noted  Bilateral feet wrapped   Neurological:      Mental Status: He is alert and oriented to person, place, and time.   Psychiatric:         Mood and Affect: Mood normal.         Behavior: Behavior normal.         Thought Content: Thought content normal.         Judgment: Judgment normal.           LABORATORY RESULTS:      CBC with diff:   Results from last 7 days   Lab Units 01/07/25  0005 01/04/25  0425 01/03/25  0453 01/02/25  0513 01/01/25  0515   WBC Thousand/uL 12.30* 12.97* 12.40* 12.83* 13.78*   HEMOGLOBIN g/dL 8.5* 8.5* 8.5* 8.6* 8.9*   HEMATOCRIT % 27.4* 27.8* 27.8* 27.8* 27.7*   MCV fL 96 96 96 96 94   PLATELETS Thousands/uL 493* 474* 455* 419* 400*   RBC Million/uL 2.87* 2.90* 2.91* 2.91* 2.94*   MCH pg 29.6 29.3 29.2 29.6 30.3   MCHC g/dL 31.0* 30.6* 30.6* 30.9* 32.1   RDW % 14.6 14.6 14.6 14.7 14.6   MPV fL 9.6 10.1 10.0 10.2 10.1   NRBC AUTO /100 WBCs 0  --   --  0  --        CMP:  Results from last 7 days   Lab Units 01/07/25  0505 01/04/25  0425 01/03/25  0453 01/02/25  0513 01/01/25  0515   POTASSIUM mmol/L 3.8 4.0 3.9 4.0 3.7   CHLORIDE mmol/L 106 108 107 109* 109*   CO2 mmol/L 23 23 24 21 22   BUN mg/dL 11 15 14 13 13   CREATININE mg/dL 0.70 0.68 0.68 0.71 0.71   CALCIUM mg/dL 8.5 8.5 8.2* 8.2* 8.1*   AST U/L 48*  --   --   --   --    ALT U/L 101*  --   --   --   --    ALK PHOS U/L 103  --   --   --   --    EGFR ml/min/1.73sq m 89 90 90 89 89       BMP:  Results from last 7 days   Lab Units 01/07/25  0505 01/04/25  0425 01/03/25  0453 01/02/25  0513 01/01/25  0515   POTASSIUM mmol/L 3.8  "4.0 3.9 4.0 3.7   CHLORIDE mmol/L 106 108 107 109* 109*   CO2 mmol/L 23 23 24 21 22   BUN mg/dL 11 15 14 13 13   CREATININE mg/dL 0.70 0.68 0.68 0.71 0.71   CALCIUM mg/dL 8.5 8.5 8.2* 8.2* 8.1*          Lab Results   Component Value Date    NTBNP 4,279 (H) 02/14/2022    NTBNP 5,157 (H) 06/22/2021            Results from last 7 days   Lab Units 01/07/25  0505 01/02/25  0513   MAGNESIUM mg/dL 1.9 1.8*       Results from last 7 days   Lab Units 01/03/25  0453   INR  1.10     Lipid Profile:   No results found for: \"CHOL\"  Lab Results   Component Value Date    HDL 51 07/26/2024    HDL 35 (L) 09/30/2021     Lab Results   Component Value Date    LDLCALC 143 (H) 07/26/2024    LDLCALC 61 09/30/2021     Lab Results   Component Value Date    TRIG 197 (H) 07/26/2024    TRIG 249 (H) 09/30/2021       Imaging: Results Review Statement: I reviewed radiology reports from this admission including: CT abdomen/pelvis.  CTA ABDOMEN W RUN OFF W WO CONTRAST  Result Date: 1/2/2025  Narrative: CT ANGIOGRAM OF THE AORTA AND LOWER EXTREMITIES WITH IV CONTRAST INDICATION: Bilateral foot wounds. Multiple prior interventions. Known metastatic prostate cancer. COMPARISON: Fluoroscopy, dated 10/30/2024. PET/CT, dated 4/25/2024. CT, dated 1/18/2024. CT, dated 4/6/2023. TECHNIQUE: CT angiogram examination of the abdomen, pelvis, and lower extremities was performed according to standard protocol with intravenous contrast. This examination, like all CT scans performed in the [facility], was performed utilizing techniques to minimize radiation dose exposure, including the use of iterative reconstruction and automated exposure control. Rad dose 2813 mGy-cm IV Contrast: 100 mL of iohexol (OMNIPAQUE) Enteric Contrast: Not administered. FINDINGS: VESSELS: THORACIC VASCULAR STRUCTURES: HEART: Mild cardiac enlargement. Aortic valvular replacement. No pericardial effusion. Coronary arterial atherosclerotic calcifications. Incomplete " visualization of pacer leads. PULMONARY ARTERIES: The main pulmonary artery is normal in size.  Nontargeted evaluation shows no central pulmonary embolism. ASCENDING AORTA: Non coronary-gated evaluation shows the ascending thoracic aorta to be normal in size and without calcifications. AORTIC ARCH: The three-vessel arch is normal. DESCENDING THORACIC AORTA: The descending thoracic aorta is normal in caliber, with mild atherosclerotic calcifcatations. ABDOMINAL VASCULAR STRUCTURES: AORTA: The abdominal aorta is normal in caliber.  There are mild atherosclerotic calcifications. CELIAC ARTERY: Conventional branching anatomy is present.  There are mild atherosclerotic calcifications at the ostia. SUPERIOR MESENTERIC ARTERY: Mild atherosclerotic calcifications at the origin. INFERIOR MESENTERIC ARTERY: Mild atherosclerotic calcifications at the origin. RIGHT RENAL ARTERY: The single renal artery has mild atherosclerotic calcifications at the origin. LEFT RENAL ARTERY: The single renal artery has mild atherosclerotic calcifications at the origin. LEFT LOWER EXTREMITY: LEFT COMMON ILIAC ARTERY: Mild calcific atherosclerotic disease.  The vessel is normal in caliber. LEFT INTERNAL ILIAC ARTERY: Severe atherosclerotic disease, which limits evaluation of intraluminal contrast.  Visualized branches are normal in caliber. LEFT EXTERNAL ILIAC ARTERY: Mild calcific atherosclerotic disease.  The vessel is normal in caliber. LEFT COMMON FEMORAL ARTERY: Calcific occlusion versus severe stenosis of the left common femoral artery (series 301, image 228), progressed from the last comparison study in April 2023. LEFT PROFUNDA FEMORIS: Moderate calcific atherosclerotic disease.  The vessel is normal in caliber. LEFT SUPERFICIAL FEMORAL ARTERY: Calcific occlusion versus severe ostial stenosis (series 301, image 240). This has also worsened from a study in April 2023. Remainder of the left superficial femoral artery has areas of mild  atherosclerotic narrowing. LEFT POPLITEAL ARTERY: Redemonstrated critical narrowing at the P2 segment (series 301, image 373), similar to the study performed last April. LEFT TIBIOPERONEAL TRUNK: Severe calcific atherosclerotic disease, which limits evaluation of intraluminal contrast.  The vessel is normal in caliber. LEFT ANTERIOR TIBIAL ARTERY: Severe calcific atherosclerotic disease, which limits evaluation of intraluminal contrast.  The vessel is normal in caliber. LEFT PERONEAL ARTERY: Severe calcific atherosclerotic disease, which limits evaluation of intraluminal contrast.  The vessel is normal in caliber. LEFT POSTERIOR TIBIAL ARTERY: Severe calcific atherosclerotic disease, which limits evaluation of intraluminal contrast.  The vessel is normal in caliber. LEFT FOOT: Visualized branches are normal. RIGHT LOWER EXTREMITY: RIGHT COMMON ILIAC ARTERY: Mild calcific atherosclerotic disease.  The vessel is normal in caliber. RIGHT INTERNAL ILIAC ARTERY: Severe atherosclerotic disease, which limits evaluation of intraluminal contrast.  Visualized branches are normal in caliber. RIGHT EXTERNAL ILIAC ARTERY: Mild calcific atherosclerotic disease.  The vessel is normal in caliber. RIGHT COMMON FEMORAL ARTERY: Moderate calcific atherosclerotic disease.  The vessel is normal in caliber. RIGHT PROFUNDA FEMORIS: Moderate calcific atherosclerotic disease.  The vessel is normal in caliber. RIGHT SUPERFICIAL FEMORAL ARTERY: Occlusion of the right superficial femoral arterial stent construct. RIGHT POPLITEAL ARTERY: Diminutive reconstitution of flow at the P2 segment. Mild atherosclerotic narrowing distally. RIGHT TIBIOPERONEAL TRUNK: Severe calcific atherosclerotic disease, which limits evaluation of intraluminal contrast.  The vessel is normal in caliber. RIGHT ANTERIOR TIBIAL ARTERY: Severe calcific atherosclerotic disease, which limits evaluation of intraluminal contrast.  The vessel is normal in caliber. RIGHT PERONEAL  ARTERY: Severe calcific atherosclerotic disease, which limits evaluation of intraluminal contrast.  The vessel is normal in caliber. RIGHT POSTERIOR TIBIAL ARTERY: Severe calcific atherosclerotic disease, which limits evaluation of intraluminal contrast.  The vessel is normal in caliber. RIGHT FOOT: Visualized branches are normal. VENOUS: Iliocaval system is normal in caliber and normal in opacification. OTHER FINDINGS ABDOMEN LOWER CHEST: No new or enlarging nodule. Redemonstrated intrapulmonary lymph node extending along the right minor fissure (series 301, image 45). Mild bibasilar reticulation. Small amount of atelectatic changes in the left upper lobe paramediastinal region. Central airways are normal. LIVER/BILIARY TREE: Unremarkable. GALLBLADDER: No calcified gallstones. No pericholecystic inflammatory change. SPLEEN: Splenic granulomata. PANCREAS: Unremarkable. ADRENAL GLANDS: Unremarkable. KIDNEYS/URETERS: Unremarkable. No hydronephrosis. PELVIS REPRODUCTIVE ORGANS: Unremarkable for patient's age. URINARY BLADDER: Unremarkable. ADDITIONAL ABDOMINAL AND PELVIC STRUCTURES STOMACH AND BOWEL: Unremarkable. APPENDIX: No findings to suggest appendicitis. ABDOMINOPELVIC CAVITY: No ascites. No pneumoperitoneum. No lymphadenopathy. ABDOMINAL WALL/INGUINAL REGIONS: Unremarkable. BONES: Redemonstrated is diffuse osteoblastic metastatic disease through the visualized axial and appendicular skeleton, similar over multiple prior studies. No pathologic fracture.     Impression: Vascular: Left lower extremity: 1.  Calcific occlusion versus severe stenosis of the left common femoral artery and ostial left superficial femoral artery, progressed from the prior CTA of April 2023. 2.  Severe calcific narrowing of the left P2 segment, similar to April 2023. 3.  Redemonstrated severe calcific tibial vascular disease, limiting luminal evaluation. Right lower extremity: 1.  Occlusion of the right superficial femoral arterial stent  construct. 2.  Redemonstrated severe calcific tibial vascular disease, limiting luminal evaluation. Nonvascular: Similar CT appearance of diffuse osteoblastic metastatic disease over multiple prior studies. No pathologic fracture. Workstation performed: [id number]     XR foot 3+ vw left  Result Date: 1/2/2025  Narrative: XR FOOT 3+ VW LEFT INDICATION: Wound on left ankle, r/o osteomyelitis. COMPARISON: None FINDINGS: There is a healing or incomplete fracture involving the lateral base of the fifth metatarsal bone. No convincing focal osseous destruction. There is a likely subacute, mildly displaced/angulated fracture involving the medial distal articular surface of the great toe, proximal phalanx. Moderate joint space narrowing and marginal spurring affecting the first MTP joint. Small plantar and posterior calcaneal enthesophytes. No lytic or blastic osseous lesion. Prominent vascular calcifications.     Impression: 1.  No focal osseous destruction. 2.  Likely subacute, mildly displaced/angulated fracture involving the proximal phalanx of the great toe. 3.  Incomplete/healing fracture involving the fifth metatarsal base. Computerized Assisted Algorithm (CAA) may have been used to analyze all applicable images. Workstation performed: [id number]     XR foot 3+ views RIGHT  Result Date: 12/26/2024  Narrative: XR FOOT 3+ VW RIGHT INDICATION: pain, ischemia. History of peripheral arterial disease; history of prostate cancer; chronic right lower extremity pain COMPARISON: 10/30/2024; 7/18/2022 FINDINGS: No acute fracture or dislocation. Degenerative changes at the fifth MTP. No bony destructive features are noted. There is sclerosis of the calcaneus slightly increased as compared to the study of October but not visible in 2022. Bone scan assessment may be useful. Calcaneal spurring is present. No lytic or blastic osseous lesion. Atherosclerotic calcifications. Otherwise unremarkable soft tissues. No subcutaneous  emphysema can be appreciated.     Impression: No x-ray evidence of osteomyelitis at this time. Some sclerosis of the calcaneus is identified of indeterminate etiology. This would be an unusual location for metastatic disease. Subtle stress injury is possible. The study was marked in EPIC for immediate notification. Computerized Assisted Algorithm (CAA) may have been used to analyze all applicable images. Workstation performed: [id number]         Counseling / Coordination of Care  Total floor / unit time spent today 45 minutes.  Greater than 50% of total time was spent with the patient and / or family counseling and / or coordination of care.  A description of the counseling / coordination of care: Review of history, current assessment, development of a plan.      Code Status: Level 1 - Full Code    ** Please Note: Dragon 360 Dictation voice to text software may have been used in the creation of this document. **

## 2025-01-07 NOTE — ASSESSMENT & PLAN NOTE
Echocardiogram 8/2/2023: EF 30-35% with RWMA, G1 DD, normal RV function, TAVR with normal function.  No significant change when compared to the echocardiogram from 11/2021.  Home diuretic regimen: Lasix 40 mg daily  Volume status appears stable

## 2025-01-08 ENCOUNTER — ANESTHESIA (INPATIENT)
Dept: PERIOP | Facility: HOSPITAL | Age: 80
End: 2025-01-08
Payer: COMMERCIAL

## 2025-01-08 LAB
ALBUMIN SERPL BCG-MCNC: 3 G/DL (ref 3.5–5)
ALP SERPL-CCNC: 100 U/L (ref 34–104)
ALT SERPL W P-5'-P-CCNC: 83 U/L (ref 7–52)
ANION GAP SERPL CALCULATED.3IONS-SCNC: 10 MMOL/L (ref 4–13)
AST SERPL W P-5'-P-CCNC: 34 U/L (ref 13–39)
BILIRUB SERPL-MCNC: 0.42 MG/DL (ref 0.2–1)
BUN SERPL-MCNC: 15 MG/DL (ref 5–25)
CALCIUM ALBUM COR SERPL-MCNC: 8.6 MG/DL (ref 8.3–10.1)
CALCIUM SERPL-MCNC: 7.8 MG/DL (ref 8.4–10.2)
CHLORIDE SERPL-SCNC: 110 MMOL/L (ref 96–108)
CO2 SERPL-SCNC: 22 MMOL/L (ref 21–32)
CREAT SERPL-MCNC: 0.64 MG/DL (ref 0.6–1.3)
ERYTHROCYTE [DISTWIDTH] IN BLOOD BY AUTOMATED COUNT: 14.6 % (ref 11.6–15.1)
GFR SERPL CREATININE-BSD FRML MDRD: 93 ML/MIN/1.73SQ M
GLUCOSE SERPL-MCNC: 89 MG/DL (ref 65–140)
HCT VFR BLD AUTO: 27.6 % (ref 36.5–49.3)
HGB BLD-MCNC: 8.4 G/DL (ref 12–17)
MCH RBC QN AUTO: 29.1 PG (ref 26.8–34.3)
MCHC RBC AUTO-ENTMCNC: 30.4 G/DL (ref 31.4–37.4)
MCV RBC AUTO: 96 FL (ref 82–98)
PLATELET # BLD AUTO: 506 THOUSANDS/UL (ref 149–390)
PMV BLD AUTO: 9.8 FL (ref 8.9–12.7)
POTASSIUM SERPL-SCNC: 3.6 MMOL/L (ref 3.5–5.3)
PROT SERPL-MCNC: 6.1 G/DL (ref 6.4–8.4)
RBC # BLD AUTO: 2.89 MILLION/UL (ref 3.88–5.62)
SODIUM SERPL-SCNC: 142 MMOL/L (ref 135–147)
WBC # BLD AUTO: 12.06 THOUSAND/UL (ref 4.31–10.16)

## 2025-01-08 PROCEDURE — 85027 COMPLETE CBC AUTOMATED: CPT | Performed by: FAMILY MEDICINE

## 2025-01-08 PROCEDURE — 88300 SURGICAL PATH GROSS: CPT | Performed by: PATHOLOGY

## 2025-01-08 PROCEDURE — 03HY32Z INSERTION OF MONITORING DEVICE INTO UPPER ARTERY, PERCUTANEOUS APPROACH: ICD-10-PCS | Performed by: ANESTHESIOLOGY

## 2025-01-08 PROCEDURE — 99232 SBSQ HOSP IP/OBS MODERATE 35: CPT | Performed by: FAMILY MEDICINE

## 2025-01-08 PROCEDURE — 99233 SBSQ HOSP IP/OBS HIGH 50: CPT

## 2025-01-08 PROCEDURE — NC001 PR NO CHARGE

## 2025-01-08 PROCEDURE — NC001 PR NO CHARGE: Performed by: SURGERY

## 2025-01-08 PROCEDURE — 4A133J1 MONITORING OF ARTERIAL PULSE, PERIPHERAL, PERCUTANEOUS APPROACH: ICD-10-PCS | Performed by: ANESTHESIOLOGY

## 2025-01-08 PROCEDURE — 80053 COMPREHEN METABOLIC PANEL: CPT | Performed by: FAMILY MEDICINE

## 2025-01-08 PROCEDURE — 99232 SBSQ HOSP IP/OBS MODERATE 35: CPT | Performed by: PODIATRIST

## 2025-01-08 PROCEDURE — 0Y6C0Z3 DETACHMENT AT RIGHT UPPER LEG, LOW, OPEN APPROACH: ICD-10-PCS | Performed by: SURGERY

## 2025-01-08 PROCEDURE — 4A133B1 MONITORING OF ARTERIAL PRESSURE, PERIPHERAL, PERCUTANEOUS APPROACH: ICD-10-PCS | Performed by: ANESTHESIOLOGY

## 2025-01-08 PROCEDURE — 27590 AMPUTATE LEG AT THIGH: CPT | Performed by: SURGERY

## 2025-01-08 RX ORDER — ONDANSETRON 2 MG/ML
4 INJECTION INTRAMUSCULAR; INTRAVENOUS ONCE AS NEEDED
Status: DISCONTINUED | OUTPATIENT
Start: 2025-01-08 | End: 2025-01-08 | Stop reason: HOSPADM

## 2025-01-08 RX ORDER — FENTANYL CITRATE 50 UG/ML
INJECTION, SOLUTION INTRAMUSCULAR; INTRAVENOUS AS NEEDED
Status: DISCONTINUED | OUTPATIENT
Start: 2025-01-08 | End: 2025-01-08

## 2025-01-08 RX ORDER — FENTANYL CITRATE/PF 50 MCG/ML
50 SYRINGE (ML) INJECTION
Status: DISCONTINUED | OUTPATIENT
Start: 2025-01-08 | End: 2025-01-08 | Stop reason: HOSPADM

## 2025-01-08 RX ORDER — ONDANSETRON 2 MG/ML
INJECTION INTRAMUSCULAR; INTRAVENOUS AS NEEDED
Status: DISCONTINUED | OUTPATIENT
Start: 2025-01-08 | End: 2025-01-08

## 2025-01-08 RX ORDER — HYDROMORPHONE HCL IN WATER/PF 6 MG/30 ML
0.2 PATIENT CONTROLLED ANALGESIA SYRINGE INTRAVENOUS
Status: DISCONTINUED | OUTPATIENT
Start: 2025-01-08 | End: 2025-01-08 | Stop reason: HOSPADM

## 2025-01-08 RX ORDER — BUPIVACAINE HYDROCHLORIDE 2.5 MG/ML
INJECTION, SOLUTION EPIDURAL; INFILTRATION; INTRACAUDAL
Status: COMPLETED | OUTPATIENT
Start: 2025-01-08 | End: 2025-01-08

## 2025-01-08 RX ORDER — MIDAZOLAM HYDROCHLORIDE 2 MG/2ML
INJECTION, SOLUTION INTRAMUSCULAR; INTRAVENOUS AS NEEDED
Status: DISCONTINUED | OUTPATIENT
Start: 2025-01-08 | End: 2025-01-08

## 2025-01-08 RX ORDER — SODIUM CHLORIDE, SODIUM LACTATE, POTASSIUM CHLORIDE, CALCIUM CHLORIDE 600; 310; 30; 20 MG/100ML; MG/100ML; MG/100ML; MG/100ML
INJECTION, SOLUTION INTRAVENOUS CONTINUOUS PRN
Status: DISCONTINUED | OUTPATIENT
Start: 2025-01-08 | End: 2025-01-08

## 2025-01-08 RX ORDER — LIDOCAINE HYDROCHLORIDE 20 MG/ML
INJECTION, SOLUTION EPIDURAL; INFILTRATION; INTRACAUDAL; PERINEURAL AS NEEDED
Status: DISCONTINUED | OUTPATIENT
Start: 2025-01-08 | End: 2025-01-08

## 2025-01-08 RX ORDER — PHENYLEPHRINE HCL IN 0.9% NACL 1 MG/10 ML
SYRINGE (ML) INTRAVENOUS AS NEEDED
Status: DISCONTINUED | OUTPATIENT
Start: 2025-01-08 | End: 2025-01-08

## 2025-01-08 RX ORDER — ROCURONIUM BROMIDE 10 MG/ML
INJECTION, SOLUTION INTRAVENOUS AS NEEDED
Status: DISCONTINUED | OUTPATIENT
Start: 2025-01-08 | End: 2025-01-08

## 2025-01-08 RX ORDER — SODIUM CHLORIDE 9 MG/ML
INJECTION, SOLUTION INTRAVENOUS CONTINUOUS PRN
Status: DISCONTINUED | OUTPATIENT
Start: 2025-01-08 | End: 2025-01-08

## 2025-01-08 RX ORDER — PROPOFOL 10 MG/ML
INJECTION, EMULSION INTRAVENOUS AS NEEDED
Status: DISCONTINUED | OUTPATIENT
Start: 2025-01-08 | End: 2025-01-08

## 2025-01-08 RX ORDER — MAGNESIUM HYDROXIDE 1200 MG/15ML
LIQUID ORAL AS NEEDED
Status: DISCONTINUED | OUTPATIENT
Start: 2025-01-08 | End: 2025-01-08 | Stop reason: HOSPADM

## 2025-01-08 RX ADMIN — METOPROLOL SUCCINATE 25 MG: 25 TABLET, EXTENDED RELEASE ORAL at 12:15

## 2025-01-08 RX ADMIN — PIPERACILLIN SODIUM AND TAZOBACTAM SODIUM 4.5 G: 36; 4.5 INJECTION, POWDER, LYOPHILIZED, FOR SOLUTION INTRAVENOUS at 05:16

## 2025-01-08 RX ADMIN — HYDROMORPHONE HYDROCHLORIDE 0.2 MG: 0.2 INJECTION, SOLUTION INTRAMUSCULAR; INTRAVENOUS; SUBCUTANEOUS at 18:15

## 2025-01-08 RX ADMIN — ROCURONIUM BROMIDE 50 MG: 10 INJECTION, SOLUTION INTRAVENOUS at 08:51

## 2025-01-08 RX ADMIN — Medication 200 MCG: at 08:52

## 2025-01-08 RX ADMIN — OXYCODONE HYDROCHLORIDE 10 MG: 10 TABLET ORAL at 21:19

## 2025-01-08 RX ADMIN — BUPIVACAINE HYDROCHLORIDE 20 ML: 2.5 INJECTION, SOLUTION EPIDURAL; INFILTRATION; INTRACAUDAL at 08:31

## 2025-01-08 RX ADMIN — FENTANYL CITRATE 50 MCG: 50 INJECTION INTRAMUSCULAR; INTRAVENOUS at 11:24

## 2025-01-08 RX ADMIN — Medication 100 MCG: at 08:39

## 2025-01-08 RX ADMIN — PREDNISONE 5 MG: 5 TABLET ORAL at 06:55

## 2025-01-08 RX ADMIN — TAMSULOSIN HYDROCHLORIDE 0.4 MG: 0.4 CAPSULE ORAL at 17:06

## 2025-01-08 RX ADMIN — OXYCODONE HYDROCHLORIDE 10 MG: 10 TABLET ORAL at 17:06

## 2025-01-08 RX ADMIN — BUPIVACAINE HYDROCHLORIDE 10 ML: 2.5 INJECTION, SOLUTION EPIDURAL; INFILTRATION; INTRACAUDAL; PERINEURAL at 08:28

## 2025-01-08 RX ADMIN — CHLORHEXIDINE GLUCONATE 0.12% ORAL RINSE 15 ML: 1.2 LIQUID ORAL at 05:16

## 2025-01-08 RX ADMIN — POTASSIUM CHLORIDE 20 MEQ: 1500 TABLET, EXTENDED RELEASE ORAL at 12:15

## 2025-01-08 RX ADMIN — PROPOFOL 70 MG: 10 INJECTION, EMULSION INTRAVENOUS at 08:50

## 2025-01-08 RX ADMIN — METHOCARBAMOL 500 MG: 500 TABLET ORAL at 17:06

## 2025-01-08 RX ADMIN — ROCURONIUM BROMIDE 20 MG: 10 INJECTION, SOLUTION INTRAVENOUS at 09:25

## 2025-01-08 RX ADMIN — SODIUM CHLORIDE, SODIUM LACTATE, POTASSIUM CHLORIDE, AND CALCIUM CHLORIDE: .6; .31; .03; .02 INJECTION, SOLUTION INTRAVENOUS at 08:21

## 2025-01-08 RX ADMIN — PHENYLEPHRINE HYDROCHLORIDE 30 MCG/MIN: 10 INJECTION INTRAVENOUS at 09:03

## 2025-01-08 RX ADMIN — SUGAMMADEX 200 MG: 100 INJECTION, SOLUTION INTRAVENOUS at 10:14

## 2025-01-08 RX ADMIN — PIPERACILLIN SODIUM AND TAZOBACTAM SODIUM 4.5 G: 36; 4.5 INJECTION, POWDER, LYOPHILIZED, FOR SOLUTION INTRAVENOUS at 13:37

## 2025-01-08 RX ADMIN — GABAPENTIN 600 MG: 300 CAPSULE ORAL at 17:05

## 2025-01-08 RX ADMIN — FAMOTIDINE 20 MG: 20 TABLET, FILM COATED ORAL at 12:14

## 2025-01-08 RX ADMIN — PENTOXIFYLLINE 400 MG: 400 TABLET, EXTENDED RELEASE ORAL at 17:06

## 2025-01-08 RX ADMIN — BUPIVACAINE 10 ML: 13.3 INJECTION, SUSPENSION, LIPOSOMAL INFILTRATION at 08:28

## 2025-01-08 RX ADMIN — PIPERACILLIN SODIUM AND TAZOBACTAM SODIUM 4.5 G: 36; 4.5 INJECTION, POWDER, LYOPHILIZED, FOR SOLUTION INTRAVENOUS at 22:59

## 2025-01-08 RX ADMIN — GABAPENTIN 600 MG: 300 CAPSULE ORAL at 12:15

## 2025-01-08 RX ADMIN — CEFAZOLIN SODIUM 2000 MG: 2 SOLUTION INTRAVENOUS at 09:00

## 2025-01-08 RX ADMIN — ENOXAPARIN SODIUM 40 MG: 40 INJECTION SUBCUTANEOUS at 12:13

## 2025-01-08 RX ADMIN — Medication 100 MCG: at 09:00

## 2025-01-08 RX ADMIN — EZETIMIBE 10 MG: 10 TABLET ORAL at 12:13

## 2025-01-08 RX ADMIN — MIDAZOLAM 2 MG: 1 INJECTION INTRAMUSCULAR; INTRAVENOUS at 08:30

## 2025-01-08 RX ADMIN — PENTOXIFYLLINE 400 MG: 400 TABLET, EXTENDED RELEASE ORAL at 12:01

## 2025-01-08 RX ADMIN — METHOCARBAMOL 500 MG: 500 TABLET ORAL at 21:19

## 2025-01-08 RX ADMIN — PENTOXIFYLLINE 400 MG: 400 TABLET, EXTENDED RELEASE ORAL at 06:55

## 2025-01-08 RX ADMIN — LIDOCAINE HYDROCHLORIDE 100 MG: 20 INJECTION, SOLUTION EPIDURAL; INFILTRATION; INTRACAUDAL; PERINEURAL at 08:50

## 2025-01-08 RX ADMIN — ASPIRIN 81 MG CHEWABLE TABLET 81 MG: 81 TABLET CHEWABLE at 12:12

## 2025-01-08 RX ADMIN — PREDNISONE 5 MG: 5 TABLET ORAL at 17:06

## 2025-01-08 RX ADMIN — ALLOPURINOL 100 MG: 100 TABLET ORAL at 12:11

## 2025-01-08 RX ADMIN — FINASTERIDE 5 MG: 5 TABLET, FILM COATED ORAL at 12:14

## 2025-01-08 RX ADMIN — HYDROMORPHONE HYDROCHLORIDE 0.2 MG: 0.2 INJECTION, SOLUTION INTRAMUSCULAR; INTRAVENOUS; SUBCUTANEOUS at 13:31

## 2025-01-08 RX ADMIN — METHOCARBAMOL 500 MG: 500 TABLET ORAL at 12:15

## 2025-01-08 RX ADMIN — ACETAMINOPHEN 975 MG: 325 TABLET, FILM COATED ORAL at 05:16

## 2025-01-08 RX ADMIN — OXYCODONE HYDROCHLORIDE 10 MG: 10 TABLET ORAL at 12:02

## 2025-01-08 RX ADMIN — FERROUS SULFATE TAB 325 MG (65 MG ELEMENTAL FE) 325 MG: 325 (65 FE) TAB at 12:14

## 2025-01-08 RX ADMIN — FENTANYL CITRATE 50 MCG: 50 INJECTION INTRAMUSCULAR; INTRAVENOUS at 08:30

## 2025-01-08 RX ADMIN — ONDANSETRON 4 MG: 2 INJECTION INTRAMUSCULAR; INTRAVENOUS at 10:10

## 2025-01-08 RX ADMIN — CLOPIDOGREL BISULFATE 75 MG: 75 TABLET, FILM COATED ORAL at 12:13

## 2025-01-08 RX ADMIN — ACETAMINOPHEN 975 MG: 325 TABLET, FILM COATED ORAL at 13:31

## 2025-01-08 RX ADMIN — Medication 100 MCG: at 08:50

## 2025-01-08 RX ADMIN — PROPOFOL 20 MG: 10 INJECTION, EMULSION INTRAVENOUS at 10:19

## 2025-01-08 RX ADMIN — Medication 100 MCG: at 08:47

## 2025-01-08 RX ADMIN — ATORVASTATIN CALCIUM 80 MG: 80 TABLET, FILM COATED ORAL at 17:06

## 2025-01-08 RX ADMIN — PROPOFOL 50 MCG/KG/MIN: 10 INJECTION, EMULSION INTRAVENOUS at 09:07

## 2025-01-08 RX ADMIN — SODIUM CHLORIDE: 9 INJECTION, SOLUTION INTRAVENOUS at 08:55

## 2025-01-08 NOTE — ANESTHESIA PROCEDURE NOTES
Peripheral Block    Patient location during procedure: holding area  Start time: 1/8/2025 8:28 AM  Reason for block: at surgeon's request and post-op pain management  Staffing  Performed by: Jayson Castro MD  Authorized by: Jayson Castro MD    Preanesthetic Checklist  Completed: patient identified, IV checked, site marked, risks and benefits discussed, surgical consent, monitors and equipment checked, pre-op evaluation and timeout performed  Peripheral Block  Patient position: supine  Prep: ChloraPrep  Patient monitoring: frequent blood pressure checks, continuous pulse oximetry and heart rate  Block type: Femoral  Laterality: right  Injection technique: single-shot  Procedures: ultrasound guided, Ultrasound guidance required for the procedure to increase accuracy and safety of medication placement and decrease risk of complications.  Ultrasound permanent image saved  bupivacaine (PF) (MARCAINE) 0.25 % injection 20 mL - Perineural   10 mL - 1/8/2025 8:28:00 AM  bupivacaine liposomal (EXPAREL) 1.3 % injection 20 mL - Perineural   10 mL - 1/8/2025 8:28:00 AM  Needle  Needle type: Stimuplex   Needle gauge: 20 G  Needle length: 4 in  Needle localization: anatomical landmarks and ultrasound guidance  Assessment  Injection assessment: incremental injection, frequent aspiration, injected with ease, negative aspiration, negative for heart rate change, no paresthesia on injection, no symptoms of intraneural/intravenous injection and needle tip visualized at all times  Paresthesia pain: none  Post-procedure:  site cleaned  patient tolerated the procedure well with no immediate complications

## 2025-01-08 NOTE — ANESTHESIA POSTPROCEDURE EVALUATION
Post-Op Assessment Note    CV Status:  Stable  Pain Score: 0    Pain management: adequate       Mental Status:  Alert and awake   Hydration Status:  Euvolemic and stable   PONV Controlled:  None   Airway Patency:  Patent  Two or more mitigation strategies used for obstructive sleep apnea   Post Op Vitals Reviewed: Yes    No anethesia notable event occurred.    Staff: CRNA           Last Filed PACU Vitals:  Vitals Value Taken Time   Temp 98.2 °F (36.8 °C) 01/08/25 1041   Pulse 80 01/08/25 1047   /57 01/08/25 1046   Resp 22 01/08/25 1047   SpO2 96 % 01/08/25 1047   Vitals shown include unfiled device data.

## 2025-01-08 NOTE — ASSESSMENT & PLAN NOTE
"Recent Labs     01/07/25  0505   SODIUM 137     No results found for: \"OSMOUA\", \"NAUR\", \"OSMOLALITSER\"    Resolved    "

## 2025-01-08 NOTE — ASSESSMENT & PLAN NOTE
Lab Results   Component Value Date    HGB 8.4 (L) 01/08/2025    HGB 8.5 (L) 01/07/2025    HGB 8.5 (L) 01/04/2025    HGB 8.5 (L) 01/03/2025    HGB 8.6 (L) 01/02/2025    CONCFE 10 (L) 12/28/2024    IRON 21 (L) 12/28/2024    FERRITIN 1,012 (H) 12/28/2024    TIBC 214.2 (L) 12/28/2024     Continue iron supplementation

## 2025-01-08 NOTE — PROGRESS NOTES
Post Op Check:    79 y.o. male, POD# 0,  s/p R above knee amputation.    Patients pain is well controlled, reports getting pain medicine prior to exam. Pt with barber in place, producing clear, orange urine. Denies nausea or emesis, able to tolerate PO fluids/medications but has not tried to eat solids yet. Passing flatus.  Pt lying in left lateral recumbent position in bed, appears comfortable and moving all extremities. Denies headache, dizziness, chest pain, SOB or abd pain.     Temp:  [97.9 °F (36.6 °C)-98.6 °F (37 °C)] 98 °F (36.7 °C)  HR:  [73-90] 89  BP: ()/() 102/47  Resp:  [15-26] 18  SpO2:  [89 %-98 %] 94 %  O2 Device: None (Room air)      Physical Exam:  General: No acute distress, alert and oriented. Napping but arouses to voice.  CV: Well perfused, regular rate and rhythm.  Lungs: Normal work of breathing, no increased respiratory effort. Breath sounds clear, no wheezing or rhonchi.   Abdomen: Soft, non-tender.   Extremities: No strikethrough to R thigh bandaging, no edema, clubbing or cyanosis to L leg, distal foot dressing CDI. Dressing on R groin from prior a-gram site CDI. Capillary   Skin: Warm, dry.   Uro: Barber intact, 100 cc clear, orange urine noted in barber bag.       Plan:  -Continue to monitor  -Diet Regular; Regular House  -Advance to solids as tolerated and encourage PO intake  -Pain and nausea control PRN  -Continue ASA, statin, Plavix  -Continue with APS and PMR recommendations  -PT/OT  -Continue care through primary team      Kyle Paulino PA-C  1/8/2025

## 2025-01-08 NOTE — PROGRESS NOTES
Progress Note - Vascular Surgery   Name: Royce Rene 79 y.o. male I MRN: [id number]  Unit/Bed#: [facility] 523-01 I Date of Admission: 1/1/2025   Date of Service: 1/8/2025 I Hospital Day: 7    Assessment & Plan  PAD (peripheral artery disease) (HCC)  Patient is a 78yo male who presented with worsening right foot wound with ischemic changes and findings concerning for wet gangrene. For his left foot wound, He is now s/p LLE agram, shockwave CFA, SFA, TPT, PT via R groin puncture. He is hemodynamically stable.     -Plan for OR today for RLE AKA. Unlikely to heal BKA given occluded SFA stents.   -NPO for OR  -Trend WBC and fever curve  -Continue Zosyn  -pain and nausea control as needed  -rest of care per primary team  -Continue ASA, Plavix, statin  -Continue to hold losartan, Jardiance, lasix for OR  -APS consult for pain control post-op    24 Hour Events : No acute events  Subjective : Patient overall feels well this morning. Continues having pain in bilateral feet, worse on the right. Denies N, V, fevers, chills, chest pain, shortness of breath.     Objective :  Temp:  [97.9 °F (36.6 °C)-98.6 °F (37 °C)] 97.9 °F (36.6 °C)  HR:  [] 81  BP: ()/(43-94) 128/70  Resp:  [16-18] 18  SpO2:  [89 %-97 %] 94 %  O2 Device: None (Room air)     I/O         01/06 0701 01/07 0700 01/07 0701  01/08 0700 01/08 0701  01/09 0700    P.O. 420 600     I.V. (mL/kg)  400 (4)     IV Piggyback  100     Total Intake(mL/kg) 420 (4.2) 1100 (11)     Urine (mL/kg/hr) 1450 (0.6) 845 (0.4)     Stool 0 0     Total Output 1450 845     Net -1030 +255            Unmeasured Stool Occurrence 1 x 2 x           Lines/Drains/Airways       Active Status       Name Placement date Placement time Site Days    Urethral Catheter Straight-tip;Non-latex 16 Fr. 01/02/25  1611  Straight-tip;Non-latex  5                  Physical Exam  Constitutional:       Appearance: Normal appearance.   HENT:      Head: Normocephalic and atraumatic.      Right Ear: External ear  "normal.      Left Ear: External ear normal.      Nose: Nose normal.   Eyes:      Conjunctiva/sclera: Conjunctivae normal.   Cardiovascular:      Rate and Rhythm: Normal rate.   Pulmonary:      Effort: Pulmonary effort is normal. No respiratory distress.   Abdominal:      General: Abdomen is flat.   Skin:     General: Skin is warm and dry.   Neurological:      General: No focal deficit present.      Mental Status: He is alert and oriented to person, place, and time.       Pulse exam:  LLE DP/PT signals  RLE no PD or PT signals identified today.         Lab Results: I have reviewed the following results:  CBC with diff:   Lab Results   Component Value Date    WBC 12.06 (H) 01/08/2025    HGB 8.4 (L) 01/08/2025    HCT 27.6 (L) 01/08/2025    MCV 96 01/08/2025     (H) 01/08/2025    RBC 2.89 (L) 01/08/2025    MCH 29.1 01/08/2025    MCHC 30.4 (L) 01/08/2025    RDW 14.6 01/08/2025    MPV 9.8 01/08/2025    NRBC 0 01/07/2025   ,   BMP/CMP:  Lab Results   Component Value Date    SODIUM 142 01/08/2025    K 3.6 01/08/2025     (H) 01/08/2025    CO2 22 01/08/2025    BUN 15 01/08/2025    CREATININE 0.64 01/08/2025    CALCIUM 7.8 (L) 01/08/2025    AST 34 01/08/2025    ALT 83 (H) 01/08/2025    ALKPHOS 100 01/08/2025    EGFR 93 01/08/2025   ,   Lipid Panel: No results found for: \"CHOL\",   Coags:   Lab Results   Component Value Date    PTT 37 04/03/2023    INR 1.10 01/03/2025   ,   Blood Culture:   Lab Results   Component Value Date    BLOODCX No Growth After 5 Days. 04/03/2023   ,   Urinalysis:   Lab Results   Component Value Date    COLORU Yellow 01/18/2024    CLARITYU Clear 01/18/2024    SPECGRAV 1.020 01/18/2024    PHUR 5.5 01/18/2024    PHUR 5.5 02/14/2022    LEUKOCYTESUR Negative 01/18/2024    NITRITE Negative 01/18/2024    GLUCOSEU 3+ (A) 01/18/2024    KETONESU Negative 01/18/2024    BILIRUBINUR Negative 01/18/2024    BLOODU Negative 01/18/2024   ,   Urine Culture: No results found for: \"URINECX\",   Wound Culure: "   Lab Results   Component Value Date    WOUNDCULT 1+ Growth of 12/12/2024       Imaging Results Review: No pertinent imaging studies reviewed.  Other Study Results Review: No additional pertinent studies reviewed.

## 2025-01-08 NOTE — ASSESSMENT & PLAN NOTE
Right foot great toe gangrene with concern for underlying osteomyelitis  Severe peripheral vascular disease  Ulceration left foot midfoot lateral  History of extensive peripheral arterial disease  Podiatry and Vascular onboard  Continue aspirin, Plavix, atorvastatin, pentoxifylline  Transferred to [place] on 1/1/2025.  Discussed with podiatry and vascular.  Per vascular the CTA of the lower extremity shows very severe disease.  Not able to do limflow procedure.  Reached out to podiatry and they are also limited due to poor vasculature of the leg and are unable to do surgery.  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   Pain control

## 2025-01-08 NOTE — ASSESSMENT & PLAN NOTE
Lab Results   Component Value Date    URICACID 11.5 (H) 12/26/2024       Patient reports right ankle pain  No obvious erythema swelling  X-ray reveals some sclerosis of calcaneus possible stress injury  CRP 79.1, uric acid 11.5.  Possible gout  Podiatry following, appreciate recommendations   Status post AKA.  Started on Zosyn per vascular surgery

## 2025-01-08 NOTE — PROGRESS NOTES
Podiatry - Progress Note  Patient: Royce Rene 79 y.o. male   MRN: [id number]  PCP: Anne Chandra MD  Unit/Bed#: [facility] 523-01 Encounter: [id number]  Date Of Visit: 25    ASSESSMENT:    Royce Rene is a 79 y.o. male with:    Right forefoot wet gangrene, stable, Poole 4  Left foot dry gangrene, Poole stage 4  Ischemic pain to bilateral lower extremities  Ulceration left lateral midfoot   Severe peripheral vascular disease      PLAN:    Patient was seen and evaluated at bedside with all questions and concerns addressed. Patient received right lower extremity AKA today and fully recovered from anesthesia. The left foot eschars are stable with no signs of infection. Performed betadine paint DSD dressing. Will continue local wound care and monitor.   Patients' lab and vital signs were reviewed. Patient is afebrile with mild leukocytosis. Patient does not  meet the SIRS criteria. Will keep monitoring.  Continue local wound care, appreciate nursing assistance with dressing changes. Wound care order is in.  No podiatric surgery planned for now.  Elevation and offloading on green foam wedges or pillows when non-ambulatory.  Rest of care per primary team.     Weightbearing status: Weightbearing as tolerated left  foot in surgical shoe    SUBJECTIVE:     The patient was seen, evaluated, and assessed at bedside today. The patient was awake, alert, and in no acute distress. No acute events overnight. The patient reports minor pain to the left foot eschars which is consistent compared to before. Patient denies N/V/F/chills/SOB/CP.      OBJECTIVE:     Vitals:   /81   Pulse 85   Temp 98.4 °F (36.9 °C) (Oral)   Resp 16   SpO2 92%     Temp (24hrs), Av.1 °F (36.7 °C), Min:97.9 °F (36.6 °C), Max:98.4 °F (36.9 °C)      Physical Exam:     Lungs: Non labored breathing  Abdomen: Soft, non-tender.  Lower Extremity:  Cardiovascular status at baseline from admission.  Neurological status at baseline from admission.   "Musculoskeletal status post right AKA. No calf tenderness noted.     Left foot shows multiple stable eschars with minor fibrous drainage to distal toes and lateral midfoot. No signs of infection is noted.     Clinical Images 01/08/25:                            Additional Data:     Labs:    Results from last 7 days   Lab Units 01/08/25  0644 01/07/25  0005   WBC Thousand/uL 12.06* 12.30*   HEMOGLOBIN g/dL 8.4* 8.5*   HEMATOCRIT % 27.6* 27.4*   PLATELETS Thousands/uL 506* 493*   SEGS PCT %  --  82*   LYMPHO PCT %  --  7*   MONO PCT %  --  7   EOS PCT %  --  2     Results from last 7 days   Lab Units 01/08/25  0644   POTASSIUM mmol/L 3.6   CHLORIDE mmol/L 110*   CO2 mmol/L 22   BUN mg/dL 15   CREATININE mg/dL 0.64   CALCIUM mg/dL 7.8*   ALK PHOS U/L 100   ALT U/L 83*   AST U/L 34     Results from last 7 days   Lab Units 01/03/25  0453   INR  1.10       * I Have Reviewed All Lab Data Listed Above.    Recent Cultures (last 7 days):               Imaging: I have personally reviewed pertinent films in PACS  EKG, Pathology, and Other Studies: I have personally reviewed pertinent reports.    ** Please Note: Portions of the record may have been created with voice recognition software. Occasional wrong word or \"sound a like\" substitutions may have occurred due to the inherent limitations of voice recognition software. Read the chart carefully and recognize, using context, where substitutions have occurred. **     "

## 2025-01-08 NOTE — PROGRESS NOTES
Progress Note - Acute Pain   Name: Royce Rene 79 y.o. male I MRN: [id number]  Unit/Bed#: [facility] 523-01 I Date of Admission: 1/1/2025   Date of Service: 1/8/2025 I Hospital Day: 7    Assessment & Plan  PAD (peripheral artery disease) (HCC)  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  Received r sided single shot femoral and intragluteal sciatic blocks with exparel 1/8    Multimodal analgesia:  Tylenol 975 mg every 8 hours scheduled  Gabapentin 600 mg 2 times daily, home medication  Estimated Creatinine Clearance: 107.2 mL/min (by C-G formula based on SCr of 0.64 mg/dL).  Robaxin 500 mg every 8 hours, home medication  venlafaxine 25 mg daily as second line agent for neuropathic pain  Oxycodone 5 mg every 4 hours as needed for moderate pain  Oxycodone 10 mg every 4 hours as needed for severe pain  IV dilaudid 0.2 mg every 4 hours as needed for breakthrough pain  Narcan as needed for respiratory depression/opioid reversal     If pain worsens significantly overnight can consider increasing dose of IV dilaudid to 0.5 mg q2 hrs PRN for breakthrough pain.  Prostate cancer (HCC)  Patient follows with palliative care outpatient for symptom management of metastatic prostate cancer.    Outpatient analgesic regimen includes:  Acetaminophen 1000 mg 3 times daily as needed  Gabapentin 600 mg 2 times daily  Methocarbamol 500 mg 3 times daily  Oxycodone 5 mg 2 times daily as needed      APS will continue to follow. Please contact Acute Pain Service - via SecureChat from [phone] with additional questions or concerns. See [employer] or [employer] for additional contacts and after hours information.     Subjective   Royce Rene is a 79 y.o. male with significant past medical history including PAD with worsening right foot wound and gangrene. He is s/p right AKA 1/8/2025. APS consulted for preoperative evaluation and postoperative pain control.    Pain History  Current pain location(s):   Pain Score: 9  Pain Location/Orientation: Orientation: Right, Location: Leg  Pain Scale: Pain Assessment Tool: 0-10  24 hour history: Seen at bedside following surgery. Reports some R sided stump pain but currently managed on current regimen. Reports phantom sensation but denies phantom pain. He is concerned about worsening wounds on his left foot. Otherwise no complaints.     Opioid requirement previous 24 hours:   100 mcg IV fentanyl perioperatively  20 mg oxycodone  0.2 mg IV Dilaudid    Meds/Allergies   all current active meds have been reviewed, current meds:   Current Facility-Administered Medications:     acetaminophen (TYLENOL) tablet 975 mg, Q8H DEWAYNE    allopurinol (ZYLOPRIM) tablet 100 mg, Daily    aspirin chewable tablet 81 mg, Daily    atorvastatin (LIPITOR) tablet 80 mg, Daily With Dinner    bismuth subsalicylate (PEPTO BISMOL) oral suspension 524 mg, Q6H PRN    clopidogrel (PLAVIX) tablet 75 mg, Daily    Diclofenac Sodium (VOLTAREN) 1 % topical gel 2 g, 4x Daily    [Held by provider] Empagliflozin (JARDIANCE) tablet 10 mg, QAM    enoxaparin (LOVENOX) subcutaneous injection 40 mg, Daily    ezetimibe (ZETIA) tablet 10 mg, Daily    famotidine (PEPCID) tablet 20 mg, Daily    ferrous sulfate tablet 325 mg, Daily    finasteride (PROSCAR) tablet 5 mg, Daily    [Held by provider] furosemide (LASIX) tablet 40 mg, Daily    gabapentin (NEURONTIN) capsule 600 mg, BID    HYDROmorphone HCl (DILAUDID) injection 0.2 mg, Q4H PRN    loperamide (IMODIUM) capsule 2 mg, 4x Daily PRN    [Held by provider] losartan (COZAAR) tablet 12.5 mg, Daily    menthol-methyl salicylate (BENGAY) 10-15 % cream, 4x Daily PRN    methocarbamol (ROBAXIN) tablet 500 mg, TID    metoprolol succinate (TOPROL-XL) 24 hr tablet 25 mg, Daily    naloxone (NARCAN) 0.04 mg/mL syringe 0.04 mg, Q1MIN PRN    ondansetron (ZOFRAN) injection 4 mg, Q6H PRN    oxyCODONE (ROXICODONE) IR tablet 5 mg, Q4H PRN **OR** oxyCODONE (ROXICODONE) immediate release tablet  10 mg, Q4H PRN    pentoxifylline (TRENtal) ER tablet 400 mg, TID With Meals    [COMPLETED] piperacillin-tazobactam (ZOSYN) 4.5 g in sodium chloride 0.9 % 100 mL IV LOADING DOSE, Once, Last Rate: Stopped (01/07/25 1549) **FOLLOWED BY** piperacillin-tazobactam (ZOSYN) 4.5 g in sodium chloride 0.9 % 100 mL IVPB (EXTENDED INFUSION), Q8H, Last Rate: 4.5 g (01/08/25 1337)    potassium chloride (Klor-Con M20) CR tablet 20 mEq, Daily    predniSONE tablet 5 mg, BID With Meals    tamsulosin (FLOMAX) capsule 0.4 mg, Daily With Dinner    venlafaxine (EFFEXOR) tablet 25 mg, Daily, and PTA meds:   Prior to Admission Medications   Prescriptions Last Dose Informant Patient Reported? Taking?   Accu-Chek FastClix Lancets MISC  Self Yes No   Alcohol Swabs (Alcohol Pads) 70 % PADS  Self Yes No   Sig: USE 2-3 TIMES AS DIRECTED.   Blood Glucose Monitoring Suppl (Accu-Chek Guide Me) w/Device KIT  Self No No   Sig: USE AS DIRECTED   Empagliflozin (Jardiance) 10 MG TABS tablet  Self No No   Sig: TAKE 1 TABLET (10 MG TOTAL) BY MOUTH EVERY MORNING   Lancet Devices (Lancing Device) MISC  Self No No   Sig: TID   acetaminophen (TYLENOL) 500 mg tablet  Self No No   Sig: Take 2 tablets (1,000 mg total) by mouth 3 (three) times a day as needed for mild pain   allopurinol (ZYLOPRIM) 100 mg tablet   No No   Sig: Take 1 tablet (100 mg total) by mouth daily   aspirin 81 mg chewable tablet  Self No No   Sig: Chew 1 tablet (81 mg total) daily   atorvastatin (LIPITOR) 80 mg tablet  Self No No   Sig: Take 1 tablet (80 mg total) by mouth daily with dinner   bismuth subsalicylate (PEPTO BISMOL) 262 MG chewable tablet  Self Yes No   Sig: Chew 524 mg As needed   calcium gluconate 1-0.675 GM/50ML-% IVPB   No No   Sig: Inject 1 g into a catheter in a vein over 0.5 hours at 100 mL/hr once for 1 dose   clopidogrel (PLAVIX) 75 mg tablet  Self No No   Sig: Take 1 tablet (75 mg total) by mouth daily   cyanocobalamin (VITAMIN B-12) 1000 MCG tablet  Self No No   Sig:  Take 1 tablet (1,000 mcg total) by mouth daily   ezetimibe (ZETIA) 10 mg tablet  Self No No   Sig: Take 1 tablet (10 mg total) by mouth daily   famotidine (PEPCID) 20 mg tablet  Self No No   Sig: Take 1 tablet (20 mg total) by mouth daily   ferrous sulfate 325 (65 Fe) mg tablet  Self No No   Sig: Take 1 tablet (325 mg total) by mouth daily   finasteride (PROSCAR) 5 mg tablet   No No   Sig: Take 1 tablet (5 mg total) by mouth daily   furosemide (LASIX) 20 mg tablet  Self No No   Sig: TAKE 2 TABLETS (40 MG TOTAL) BY MOUTH DAILY   gabapentin (NEURONTIN) 300 mg capsule  Self No No   Sig: Take 2 capsules (600 mg total) by mouth 2 (two) times a day   glucose blood (Accu-Chek Guide) test strip  Self No No   Sig: TEST 2-3 TIMES AS DIRECTED   levofloxacin (LEVAQUIN) 500 mg tablet  Self No No   Sig: Take 1 tablet (500 mg total) by mouth every 24 hours for 14 days   loperamide (IMODIUM) 2 mg capsule  Self No No   Sig: Take 1 capsule (2 mg total) by mouth 4 (four) times a day as needed for diarrhea   losartan (COZAAR) 25 mg tablet  Self No No   Sig: TAKE 0.5 TABLETS (12.5 MG TOTAL) BY MOUTH DAILY   methocarbamol (ROBAXIN) 500 mg tablet   No No   Sig: TAKE 1 TABLET (500 MG TOTAL) BY MOUTH 3 (THREE) TIMES A DAY   metoprolol succinate (TOPROL-XL) 25 mg 24 hr tablet  Self No No   Sig: Take 1 tablet (25 mg total) by mouth daily   ondansetron (ZOFRAN) 4 mg tablet  Self No No   Sig: Take 1 tablet (4 mg total) by mouth every 8 (eight) hours as needed for nausea or vomiting   oxyCODONE (Roxicodone) 5 immediate release tablet  Self No No   Sig: Take 1 tablet (5 mg total) by mouth 2 (two) times a day as needed (wound pain) Max Daily Amount: 10 mg   pentoxifylline (TRENtal) 400 mg ER tablet  Self No No   Sig: Take 1 tablet (400 mg total) by mouth 3 (three) times a day with meals   polyethylene glycol (GLYCOLAX) 17 GM/SCOOP powder  Self No No   Sig: Take 17 g by mouth every other day Monday Wednesday Friday   potassium chloride (Klor-Con  M20) 20 mEq tablet  Self No No   Sig: TAKE 1 TABLET (20 MEQ TOTAL) BY MOUTH DAILY   predniSONE 5 mg tablet  Self No No   Sig: TAKE 1 TABLET (5 MG TOTAL) BY MOUTH 2 (TWO) TIMES A DAY WITH MEALS   tamsulosin (FLOMAX) 0.4 mg   No No   Sig: Take 1 capsule (0.4 mg total) by mouth daily with dinner      Facility-Administered Medications: None     No Known Allergies  Objective :  Temp:  [97.9 °F (36.6 °C)-98.6 °F (37 °C)] 98 °F (36.7 °C)  HR:  [] 89  BP: ()/() 102/47  Resp:  [15-26] 18  SpO2:  [89 %-98 %] 94 %  O2 Device: None (Room air)  Nasal Cannula O2 Flow Rate (L/min):  [2 L/min-6 L/min] 2 L/min    Physical Exam  Vitals reviewed.   HENT:      Head: Normocephalic and atraumatic.   Cardiovascular:      Rate and Rhythm: Normal rate.   Pulmonary:      Effort: Pulmonary effort is normal.   Abdominal:      General: There is no distension.      Tenderness: There is no abdominal tenderness.   Musculoskeletal:         General: Tenderness (RLE) present.      Cervical back: Normal range of motion.   Skin:     General: Skin is warm and dry.   Neurological:      Mental Status: He is alert and oriented to person, place, and time. Mental status is at baseline.   Psychiatric:         Mood and Affect: Mood normal.         Behavior: Behavior normal.            Lab Results: I have reviewed the following results:  Estimated Creatinine Clearance: 107.2 mL/min (by C-G formula based on SCr of 0.64 mg/dL).  Lab Results   Component Value Date    WBC 12.06 (H) 01/08/2025    HGB 8.4 (L) 01/08/2025    HCT 27.6 (L) 01/08/2025     (H) 01/08/2025         Component Value Date/Time    K 3.6 01/08/2025 0644     (H) 01/08/2025 0644    CO2 22 01/08/2025 0644    BUN 15 01/08/2025 0644    CREATININE 0.64 01/08/2025 0644         Component Value Date/Time    CALCIUM 7.8 (L) 01/08/2025 0644    ALKPHOS 100 01/08/2025 0644    AST 34 01/08/2025 0644    ALT 83 (H) 01/08/2025 0644    TP 6.1 (L) 01/08/2025 0644    ALB 3.0 (L)  01/08/2025 0644       Imaging Results Review: No pertinent imaging studies reviewed.  Other Study Results Review: No additional pertinent studies reviewed.

## 2025-01-08 NOTE — ASSESSMENT & PLAN NOTE
Lab Results   Component Value Date    HGB 8.5 (L) 01/07/2025    HGB 8.5 (L) 01/04/2025    HGB 8.5 (L) 01/03/2025    HGB 8.6 (L) 01/02/2025    HGB 8.9 (L) 01/01/2025    CONCFE 10 (L) 12/28/2024    IRON 21 (L) 12/28/2024    FERRITIN 1,012 (H) 12/28/2024    TIBC 214.2 (L) 12/28/2024     Continue iron supplementation

## 2025-01-08 NOTE — ANESTHESIA PREPROCEDURE EVALUATION
"Procedure:  AMPUTATION ABOVE KNEE (AKA)/ Right (Right: Leg Upper)    Relevant Problems   CARDIO   (+) CAD (coronary artery disease)   (+) Embolism and 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 (HCC)   (+) Ischemic pain of foot at rest   (+) Mixed hyperlipidemia   (+) PAD (peripheral artery disease) (HCC)      /RENAL   (+) Prostate cancer (HCC)      HEMATOLOGY   (+) Anemia   (+) Iron deficiency anemia      NEURO/PSYCH   (+) Depression, recurrent (HCC)   (+) Moderate vascular dementia (HCC)      Other   (+) Osteomyelitis (HCC)      Lab Results   Component Value Date    WBC 12.06 (H) 01/08/2025    HGB 8.4 (L) 01/08/2025    HCT 27.6 (L) 01/08/2025    MCV 96 01/08/2025     (H) 01/08/2025     Lab Results   Component Value Date    K 3.6 01/08/2025    CO2 22 01/08/2025     (H) 01/08/2025    BUN 15 01/08/2025    CREATININE 0.64 01/08/2025     Lab Results   Component Value Date    INR 1.10 01/03/2025    INR 0.98 10/30/2024    INR 1.13 04/03/2023    PROTIME 14.5 01/03/2025    PROTIME 13.5 10/30/2024    PROTIME 14.9 (H) 04/03/2023     Lab Results   Component Value Date    PTT 37 04/03/2023       No results found for: \"GLUCOSE\"    Lab Results   Component Value Date    HGBA1C 5.5 10/23/2024       Type and Screen:  O    TTE 8/2023    Interpretation Summary         Left Ventricle: Left ventricular cavity size is normal. Wall thickness is increased. There is moderate concentric hypertrophy. The left ventricular ejection fraction is 30-35%. Systolic function is severely reduced. There is mid anteroseptal, mid inferoseptal, apical septal, apical lateral, apical inferior, apical anterior and apical akinesis with wall thinning, suggestive of aneurysmal changes. Diastolic function is mildly abnormal, consistent with grade I (abnormal) relaxation.  Left atrial filling pressure is elevated.  No definite LV apical thrombus seen.  Contrast not utilized.    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 The prosthetic " valve appears well-seated. There is mild paravalvular regurgitation. The gradient recorded across the prosthetic aortic valve is within the expected range. The aortic valve peak velocity is 2.03 m/s. The aortic valve peak gradient is 16 mmHg. The aortic valve mean gradient is 9 mmHg. The aortic valve area is 1.07 cm2.    Mitral Valve: There is mild annular calcification. There is trace regurgitation.    Right Ventricle: Systolic function is normal.    Left Atrium: The atrium is mildly dilated (35-41 mL/m2).    Prior TTE study available for comparison. Prior study date: 11/17/2021. No significant changes noted compared to the prior study.       Physical Exam    Airway    Mallampati score: II  TM Distance: >3 FB  Neck ROM: full     Dental   Comment: Edentulous     Cardiovascular      Pulmonary      Other Findings        Anesthesia Plan  ASA Score- 4     Anesthesia Type- general with ASA Monitors.         Additional Monitors: arterial line.    Airway Plan: ETT.    Comment: Plan for long acting femoral and sciatic nerve blocks for postoperative analgesia discussed with risks/benefits/alternatives. .       Plan Factors-Exercise tolerance (METS): >4 METS.    Chart reviewed.   Existing labs reviewed. Patient summary reviewed.                  Induction- intravenous.    Postoperative Plan- Plan for postoperative opioid use. Planned trial extubation    Perioperative Resuscitation Plan - Level 1 - Full Code.       Informed Consent- Anesthetic plan and risks discussed with patient.  I personally reviewed this patient with the CRNA. Discussed and agreed on the Anesthesia Plan with the CRNA..

## 2025-01-08 NOTE — DISCHARGE INSTR - AVS FIRST PAGE
DISCHARGE INSTRUCTIONS  AMPUTATION    REHABILITATION:   You will require some form of rehabilitation after this procedure. This will assist with your return to daily activities by incorporating exercise and balance training. This may be in the form of visiting nurses and physical therapy in your home or more commonly, admission to a rehabilitation facility for a period of time before you return home.     ACTIVITY:  You cannot put any weight on the bottom of your residual limb. Physical therapy and rehab staff will direct progression of your activity based on your progress. Please follow their recommendations closely.    It is incredibly important to avoid falling on your residual limb as this can cause bleeding and the wound to open up.  Your surgeon will decide when you are ready to be referred to the prosthetist for a fitting.  This will occur sometime after your 4-week appointment and often later.    DIET:  Resume your normal diet.  Good nutrition is important for healing of your incision.    INCISION:  Wash incision daily with soap and water and thoroughly pat dry.  Apply clean, dry gauze and an ACE wrap daily to decrease swelling and get your stump ready to be fit for a prosthetic.  It is normal to have swelling or discoloration around the incision. If increasing redness or pain develops, call our office immediately.    You will have stitches or staples and these will be evaluated for removal at your first post-operative visit around 4 weeks after surgery. If any of your incisions are open and require dressing changes, you will be given instructions for your daily incision care. If you are not able to change the dressings, a visiting nurse will be arranged.  Do not bathe in a tub or swim until your incision is completely healed.  DO NOT put any powders, creams, ointments, or lotions on your incision.    FOLLOW UP APPOINTMENTS:  Making and keeping follow up appointments and ultrasound tests are important to your  recovery.  If you have difficulty making it to or keeping your follow up appointments, call the office.    If you have increased pain, fever >101.5, increased drainage, redness or a bad smell at your surgery site, new coldness/numbness of your arm or leg, please call us immediately and GO directly to the ER.    Appt w/ Dr. [place]: 2/3/2025 at 1pm, [place] office      PLEASE CALL THE OFFICE IF YOU HAVE ANY QUESTIONS  [phone]  [phone] [address], [place] [address]  [address] [place] [address]  [address] [place] [address], [place] [address]  [address] [facility], 1st Floor, [place] [address]  [address] [facility], [address], [place] [address]  [address] [facility], [address], [place] [address]  1165 [facility], Entrance A, 2nd Floor, [place] [address]  [address] [facility], 1st Floor, [address], [place] [address]  [address], [place] [address]  [address] [facility], [address], [place] [address]  __________________________________________________________________________________      DISCHARGE INSTRUCTIONS  ARTERIOGRAM/ANGIOPLASTY/STENT    ACTIVITY: On the evening following the procedure, you should be mostly resting.  Someone should remain with you during the evening and overnight following the procedure.     On the day after your procedure, limit your activity to walking.  Avoid heavy lifting (no more than 15 lbs) for the first three days. Walking up steps and normal activities may be resumed as you feel ready.   You should not drive a car for at least two days following discharge from the hospital. You may ride in a car.   If you have any questions regarding a particular activity, please discuss with your doctor or nurse before you are discharged.    DIET:  Resume your normal diet.  Drink more water than usual for the next 24 hours.    PROCEDURE SITE: You may have a procedure site in your groin, arm, or foot.  You may have surgical  glue at your procedure site.  The glue is used to cover the procedure site, assist in closure, and prevent contamination. This adhesive will darken and peel away on its own within one to two weeks. Do not pick at it.    You should shower daily.  Wash incision daily with soap and water, but do not rub or scrub the incision; rinse thoroughly and pat dry.  Do not bathe in a tub or swim for the first 2 week following your procedure or if you have any open wounds.  It is normal to have some bruising, swelling or discoloration around the procedure site.  IF increasing redness, pain, or a bulge develops, call our office immediately.    If present, you may remove the band-aid or “steri-strips” over your procedure site after two days.   If you notice any active bleeding at the site, apply pressure to the site and call our office ([phone]) or 911.    FOLLOW UP STUDIES:  Your doctor will discuss whether further treatments or follow-up studies are necessary at your first post procedure visit.    FOLLOW UP APPOINTMENTS:  Making and keeping follow up appointments and ultrasound tests are important to your recovery.  If you have difficulty making it to or keeping your follow up appointments, call the office.    If you have increased pain, fever >101.5, increased drainage, redness or a bad smell at your surgery site, new coldness/numbness of your arm or leg, please call us immediately and GO directly to the ER.    Appt w/ Dr. [place]: 2/3/2025 at 1pm, [place] office    PLEASE CALL THE OFFICE IF YOU HAVE ANY QUESTIONS  [phone]  [phone] [address], [address], [place] [address]  [address] [place] [address]    [address] [place] [address]  360 [facility], [place] [address]  [address] [facility], [address], [place] [address]  [address] [facility], [address] [address]  1165 [facility], [facility], 2nd Floor, [place] [address] [address]  [facility], 1st Floor, [address], [place] [address]  [address], [address], [place] [address]  [address] [facility], [address], [place] [address]      _______________________________________________________________________________________________________________________  Discharge Instructions - Podiatry    Weight Bearing Status: weight bearing as tolerated to left foot                       Pain: Continue analgesics as directed    Follow-up appointment instructions: Please make an appointment within one week of discharge with [facility] wound care center with Dr. Montes. Contact sooner if any increase in pain, or signs of infection occur    Patient Wound Care Instructions: Leave dressings clean, dry, and intact between professional changes.    Nursing Wound Care Instructions: Gently cleanse left foot with antimicrobial soap and water.  Please apply betadine paint to the left toes and lateral foot eschar and cover with regular 4x4 gauze, rolled gauze, secure with tape.  Change Monday Wednesday Friday

## 2025-01-08 NOTE — ASSESSMENT & PLAN NOTE
Pic under media.  Low suspicion for active infection  Per podiatry, no surgical plans  Status post angiogram with the intervention today

## 2025-01-08 NOTE — ASSESSMENT & PLAN NOTE
History of prostate cancer follows with oncology receiving chemotherapy (last on 12/12)  Follows with Dr. Hernandez  Current regimen is Cabazitaxel, Neulasta, prednisone, Lupron, denosumab  Continue to follow in outpatient setting  On prednisone 5 mg twice daily-a monitor for hypotension perioperatively.  If there is a concern for hypotension will add stress dose of steroids

## 2025-01-08 NOTE — OP NOTE
OPERATIVE REPORT  PATIENT NAME: Royce Rene    :  1945  MRN: [id number]  Pt Location: BE OR ROOM 15    SURGERY DATE: 2025    Surgeons and Role:     * Serina Cook DO - Primary     * Joshua Amador MD - Assisting     * Kyle Paulino PA-C - Assisting    Preop Diagnosis:  PAD (peripheral artery disease) ([facility]) [I73.9]    Post-Op Diagnosis Codes:     * PAD (peripheral artery disease) ([facility]) [I73.9]    Procedure(s):  Right - RIGHT AMPUTATION ABOVE KNEE (AKA)    Specimen(s):  ID Type Source Tests Collected by Time Destination   1 : RIGHT ABOVE KNEE AMPUTATION Tissue Leg, Right TISSUE EXAM Serina Cook DO 2025 0918        Estimated Blood Loss:   Minimal    Drains:  Urethral Catheter Straight-tip;Non-latex 16 Fr. (Active)   Reasons to continue Urinary Catheter  Acute urinary retention/obstruction failing urinary retention protocol 25 0517   Goal for Removal Voiding trial when ambulation improves 25 0517   Site Assessment Clean;Skin intact 25 1041   Moser Care Done 25 2100   Collection Container Standard drainage bag 25 1041   Securement Method Securing device (Describe) 25 1041   Urethral Irrigation Intake (mL) 200 mL 25 2000   Output (mL) 150 mL 25 0520   Number of days: 6       [REMOVED] Urethral Catheter Double-lumen;Straight-tip 16 Fr. (Removed)   Number of days: 4       [REMOVED] Urethral Catheter Latex 16 Fr. (Removed)   Number of days: 19       Anesthesia Type:   General    Operative Indications:  PAD (peripheral artery disease) ([facility]) [I73.9]      Operative Findings:  Right foot gangrenous. Muscle and tissues healthy appearing at level of amputation. Minimal blood loss.       Complications:   None    Procedure and Technique:  Informed consent was obtained from the patient prior to proceeding to the operating room.  The patient was then identified in the pre-anesthesia area, marked, then taken to the operating room, placed in the  supine position on the operating table, and induced under general endotracheal anesthesia. The patient was correctly positioned, padded at all pressure points. The right lower extremity was then prepared and draped in a sterile fashion.  A pre-operative timeout was performed.  Preoperative antibiotics were administered at this time.       An incision was created in a fishmouth shape with the corners of the mouth centered over the groove between the quadriceps and biceps femoris at a point approximately one hands breadth above the patella. Dissection was carried down to the muscular fascia which was opened with the cautery. The muscles of the quadriceps were divided with the cautery. The femoral vessels were suture ligated with 2-0 Vicryl sutures and transected with Metzenbaum scissors. The femur was stripped of its periosteum. The femur was divided with an oscillating bone saw. The posterior amputation flap was then completed with bovie cautery.The sciatic nerve was identified and ligated with 2-0 silk suture high within the wound. The wound was irrigated copiously with sterile saline.  Muscle and tendon of the anterior and posterior compartments were sutured together around the femur with a 0 Vicryl suture to provide additional soft tissue coverage. The investing fascia was then closed with interrupted Vicryl sutures. Interrupted 2-0 Vicryl sutures were used to close the deep dermal layer. The skin was approximated with staples. A sterile occlusive dressing was applied comprising of 4x4 gauze, abdominal pads, Kerlix, and an ACE wrap. All counts correct x2 at the conclusion of the procedure. The patient was then transferred to recovery room in stable condition.        was present for the entire procedure    Patient Disposition:  PACU  and extubated and stable             SIGNATURE: Joshua Amador MD  DATE: January 8, 2025  TIME: 11:36 AM

## 2025-01-08 NOTE — ANESTHESIA PROCEDURE NOTES
"Arterial Line Insertion    Performed by: Jyala Rodriguez CRNA  Authorized by: Jayla Rodriguez CRNA  Consent: Verbal consent obtained. Written consent obtained.  Risks and benefits: risks, benefits and alternatives were discussed  Consent given by: patient  Patient understanding: patient states understanding of the procedure being performed  Patient consent: the patient's understanding of the procedure matches consent given  Procedure consent: procedure consent matches procedure scheduled  Relevant documents: relevant documents present and verified  Test results: test results available and properly labeled  Site marked: the operative site was marked  Radiology Images: Radiology Images displayed and confirmed. If images not available, report reviewed  Patient identity confirmed: verbally with patient and hospital-assigned identification number  Time out: Immediately prior to procedure a \"time out\" was called to verify the correct patient, procedure, equipment, support staff and site/side marked as required.  Preparation: Patient was prepped and draped in the usual sterile fashion.  Indications: hemodynamic monitoring  Orientation:  Right  Location: radial artery  Procedure Details:  Needle gauge: 20  Number of attempts: 1    Post-procedure:  Post-procedure: dressing applied  Waveform: good waveform  Post-procedure CNS: normal  Patient tolerance: patient tolerated the procedure well with no immediate complications          "

## 2025-01-08 NOTE — ASSESSMENT & PLAN NOTE
Right foot great toe gangrene with concern for underlying osteomyelitis  Severe peripheral vascular disease  Ulceration left foot midfoot lateral  History of extensive peripheral arterial disease  Podiatry and Vascular onboard  Continue aspirin, Plavix, atorvastatin, pentoxifylline  Transferred to [facility] on 1/1/2025.  Discussed with podiatry and vascular.  Per vascular the CTA of the lower extremity shows very severe disease.  Not able to do limflow procedure.  Reached out to podiatry and they are also limited due to poor vasculature of the leg and are unable to do surgery.  Per vascular patient has severe SFA stent occlusion and distal left CFA and proximal left SFA occlusion  No more revasularization options  Palliative wound care advised  IR consuled for IR LLE -status a gram shockwave CFA SFA TPT PT today by interventional radiology  Vascular surgery is planning for right AKA tomorrow-cardiology and acute pain and PMR consult appreciated-started on on Zosyn per vascular surgery

## 2025-01-08 NOTE — ASSESSMENT & PLAN NOTE
Baseline- close to wheelchair bounded- minimal walk.  Fall precautions  Will find placement after procedure

## 2025-01-08 NOTE — ASSESSMENT & PLAN NOTE
Baseline- close to wheelchair bounded- minimal walk.  Fall precautions  Likely need rehab postprocedure.  PMR consult appreciated

## 2025-01-08 NOTE — ANESTHESIA PROCEDURE NOTES
Peripheral Block    Patient location during procedure: holding area  Start time: 1/8/2025 8:31 AM  Reason for block: procedure for pain, at surgeon's request and post-op pain management  Staffing  Performed by: Jayson Castro MD  Authorized by: Jayson Castro MD    Preanesthetic Checklist  Completed: patient identified, IV checked, site marked, risks and benefits discussed, surgical consent, monitors and equipment checked, pre-op evaluation and timeout performed  Peripheral Block  Patient position: left lateral  Prep: ChloraPrep  Patient monitoring: frequent blood pressure checks, continuous pulse oximetry and heart rate  Block type: Infragluteal Sciatic  Laterality: right  Injection technique: single-shot  Procedures: ultrasound guided, Ultrasound guidance required for the procedure to increase accuracy and safety of medication placement and decrease risk of complications.  Ultrasound permanent image saved  bupivacaine (PF) (MARCAINE) 0.25 % injection 20 mL - Perineural   20 mL - 1/8/2025 8:31:00 AM  bupivacaine liposomal (EXPAREL) 1.3 % injection 20 mL - Perineural   10 mL - 1/8/2025 8:31:00 AM  Needle  Needle type: Stimuplex   Needle gauge: 20 G  Needle length: 4 in  Needle localization: anatomical landmarks and ultrasound guidance  Assessment  Injection assessment: incremental injection, frequent aspiration, injected with ease, negative aspiration, negative for heart rate change, no paresthesia on injection, no symptoms of intraneural/intravenous injection and needle tip visualized at all times  Paresthesia pain: none  Post-procedure:  site cleaned  patient tolerated the procedure well with no immediate complications

## 2025-01-08 NOTE — PROGRESS NOTES
Progress Note - Hospitalist   Name: Royce Rene 79 y.o. male I MRN: [id number]  [facility] I Date of Admission: 1/1/2025   Date of Service: 1/8/2025 I Hospital Day: 7    Assessment & Plan  PAD (peripheral artery disease) (HCC)    Right foot great toe gangrene with concern for underlying osteomyelitis  Severe peripheral vascular disease  Ulceration left foot midfoot lateral  History of extensive peripheral arterial disease  Podiatry and Vascular onboard  Continue aspirin, Plavix, atorvastatin, pentoxifylline  Transferred to [facility] on 1/1/2025.  Discussed with podiatry and vascular.  Per vascular the CTA of the lower extremity shows very severe disease.  Not able to do limflow procedure.  Reached out to podiatry and they are also limited due to poor vasculature of the leg and are unable to do surgery.  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   Pain control  Ambulatory dysfunction  Baseline- close to wheelchair bounded- minimal walk.  Fall precautions  Likely need rehab postprocedure.  PMR consult appreciated  Chronic combined systolic and diastolic CHF (congestive heart failure) (HCC)  Wt Readings from Last 3 Encounters:   12/26/24 100 kg (220 lb 14.4 oz)   12/13/24 103 kg (227 lb)   12/12/24 103 kg (227 lb)   Home diuretic: PO Lasix 40mg QD \  Continue metoprolol, losartan, Jardiance  Low-salt diet  Monitor I/O, daily weights        Prostate cancer (HCC)  History of prostate cancer follows with oncology receiving chemotherapy (last on 12/12)  Follows with Dr. Hernandez  Current regimen is Cabazitaxel, Neulasta, prednisone, Lupron, denosumab  Continue to follow in outpatient setting  On prednisone 5 mg twice  "daily-a monitor for hypotension perioperatively.  If there is a concern for hypotension will add stress dose of steroids  Diarrhea  Patient with episodes of diarrhea since receiving chemotherapy last week.  He also reports some nausea and vomiting.  Poor PO intake  Likely chemotherapy associated.  Improving  C. difficile and stool enteric panel negative  Supportive cares  Replete electrolytes  Imodium as needed  CAD (coronary artery disease)  Continue aspirin, metoprolol, atorvastatin  Urinary retention  Had to be straight cathed x 1  Started on Flomax and Finasteride with improvement in symptoms  Has been able to urinate on his own  Urinary retention protocol  Outpatient followup with Urology  Right ankle pain  Lab Results   Component Value Date    URICACID 11.5 (H) 12/26/2024       Patient reports right ankle pain  No obvious erythema swelling  X-ray reveals some sclerosis of calcaneus possible stress injury  CRP 79.1, uric acid 11.5.  Possible gout  Podiatry following, appreciate recommendations   Status post AKA.  Started on Zosyn per vascular surgery    Ulcer of left foot, limited to breakdown of skin (HCC)  Pic under media.  Low suspicion for active infection  Per podiatry, no surgical plans  Status post angiogram with the intervention today      Hyponatremia  Recent Labs     01/07/25  0505 01/08/25  0644   SODIUM 137 142     No results found for: \"OSMOUA\", \"NAUR\", \"OSMOLALITSER\"    Resolved    Iron deficiency anemia  Lab Results   Component Value Date    HGB 8.4 (L) 01/08/2025    HGB 8.5 (L) 01/07/2025    HGB 8.5 (L) 01/04/2025    HGB 8.5 (L) 01/03/2025    HGB 8.6 (L) 01/02/2025    CONCFE 10 (L) 12/28/2024    IRON 21 (L) 12/28/2024    FERRITIN 1,012 (H) 12/28/2024    TIBC 214.2 (L) 12/28/2024     Continue iron supplementation    Ischemic cardiomyopathy    Mixed hyperlipidemia    History of transcatheter aortic valve replacement (TAVR)    Encounter for preoperative assessment for noncoronary cardiac " surgery      VTE Pharmacologic Prophylaxis: VTE Score: 6 Moderate Risk (Score 3-4) - Pharmacological DVT Prophylaxis Ordered: heparin.    Mobility:   Basic Mobility Inpatient Raw Score: 9  JH-HLM Goal: 3: Sit at edge of bed  JH-HLM Achieved: 3: Sit at edge of bed  JH-HLM Goal achieved. Continue to encourage appropriate mobility.    Patient Centered Rounds: I performed bedside rounds with nursing staff today.   Discussions with Specialists or Other Care Team Provider:     Education and Discussions with Family / Patient: Updated  (son) at bedside.    Current Length of Stay: 7 day(s)  Current Patient Status: Inpatient   Certification Statement: The patient will continue to require additional inpatient hospital stay due to status post AKA  Discharge Plan:     Code Status: Level 1 - Full Code    Subjective   Patient seen and examined.  Had LALO earlier today.  EP is on board for pain control.    Objective :  Temp:  [97.9 °F (36.6 °C)-98.6 °F (37 °C)] 98.4 °F (36.9 °C)  HR:  [73-91] 85  BP: ()/() 109/81  Resp:  [15-26] 16  SpO2:  [87 %-98 %] 92 %  O2 Device: None (Room air)  Nasal Cannula O2 Flow Rate (L/min):  [2 L/min-6 L/min] 2 L/min    There is no height or weight on file to calculate BMI.     Input and Output Summary (last 24 hours):     Intake/Output Summary (Last 24 hours) at 1/8/2025 1528  Last data filed at 1/8/2025 1032  Gross per 24 hour   Intake 320 ml   Output 720 ml   Net -400 ml       Physical Exam  Constitutional:       General: He is not in acute distress.     Appearance: He is not ill-appearing.   HENT:      Head: Normocephalic and atraumatic.      Nose: Nose normal. No congestion.   Eyes:      General: No scleral icterus.  Cardiovascular:      Rate and Rhythm: Normal rate and regular rhythm.      Heart sounds: No murmur heard.  Pulmonary:      Comments: Decreased breath sounds bilateral  Musculoskeletal:      Comments:   Right AKA site covered in dressing-no strikethrough  bleeding noted  Dry eschar noted on the hallux and second toe     Skin:     General: Skin is warm.      Coloration: Skin is not jaundiced.   Neurological:      Mental Status: He is alert. Mental status is at baseline.           Lines/Drains:  Lines/Drains/Airways       Active Status       Name Placement date Placement time Site Days    Urethral Catheter Straight-tip;Non-latex 16 Fr. 01/02/25  1611  Straight-tip;Non-latex  5                  Urinary Catheter:  Goal for removal: Plan to remove POD#1         Central Line:  Goal for removal: Port-A-Cath               Lab Results: I have reviewed the following results:   Results from last 7 days   Lab Units 01/08/25  0644 01/07/25  0005   WBC Thousand/uL 12.06* 12.30*   HEMOGLOBIN g/dL 8.4* 8.5*   HEMATOCRIT % 27.6* 27.4*   PLATELETS Thousands/uL 506* 493*   SEGS PCT %  --  82*   LYMPHO PCT %  --  7*   MONO PCT %  --  7   EOS PCT %  --  2     Results from last 7 days   Lab Units 01/08/25  0644   SODIUM mmol/L 142   POTASSIUM mmol/L 3.6   CHLORIDE mmol/L 110*   CO2 mmol/L 22   BUN mg/dL 15   CREATININE mg/dL 0.64   ANION GAP mmol/L 10   CALCIUM mg/dL 7.8*   ALBUMIN g/dL 3.0*   TOTAL BILIRUBIN mg/dL 0.42   ALK PHOS U/L 100   ALT U/L 83*   AST U/L 34   GLUCOSE RANDOM mg/dL 89     Results from last 7 days   Lab Units 01/03/25  0453   INR  1.10     Results from last 7 days   Lab Units 01/03/25  1034 01/02/25  1033   POC GLUCOSE mg/dl 113 91               Recent Cultures (last 7 days):               Last 24 Hours Medication List:     Current Facility-Administered Medications:     acetaminophen (TYLENOL) tablet 975 mg, Q8H DEWAYNE    allopurinol (ZYLOPRIM) tablet 100 mg, Daily    aspirin chewable tablet 81 mg, Daily    atorvastatin (LIPITOR) tablet 80 mg, Daily With Dinner    bismuth subsalicylate (PEPTO BISMOL) oral suspension 524 mg, Q6H PRN    clopidogrel (PLAVIX) tablet 75 mg, Daily    Diclofenac Sodium (VOLTAREN) 1 % topical gel 2 g, 4x Daily    [Held by provider]  Empagliflozin (JARDIANCE) tablet 10 mg, QAM    enoxaparin (LOVENOX) subcutaneous injection 40 mg, Daily    ezetimibe (ZETIA) tablet 10 mg, Daily    famotidine (PEPCID) tablet 20 mg, Daily    ferrous sulfate tablet 325 mg, Daily    finasteride (PROSCAR) tablet 5 mg, Daily    [Held by provider] furosemide (LASIX) tablet 40 mg, Daily    gabapentin (NEURONTIN) capsule 600 mg, BID    HYDROmorphone HCl (DILAUDID) injection 0.2 mg, Q4H PRN    loperamide (IMODIUM) capsule 2 mg, 4x Daily PRN    [Held by provider] losartan (COZAAR) tablet 12.5 mg, Daily    menthol-methyl salicylate (BENGAY) 10-15 % cream, 4x Daily PRN    methocarbamol (ROBAXIN) tablet 500 mg, TID    metoprolol succinate (TOPROL-XL) 24 hr tablet 25 mg, Daily    naloxone (NARCAN) 0.04 mg/mL syringe 0.04 mg, Q1MIN PRN    ondansetron (ZOFRAN) injection 4 mg, Q6H PRN    oxyCODONE (ROXICODONE) IR tablet 5 mg, Q4H PRN **OR** oxyCODONE (ROXICODONE) immediate release tablet 10 mg, Q4H PRN    pentoxifylline (TRENtal) ER tablet 400 mg, TID With Meals    [COMPLETED] piperacillin-tazobactam (ZOSYN) 4.5 g in sodium chloride 0.9 % 100 mL IV LOADING DOSE, Once, Last Rate: Stopped (01/07/25 1549) **FOLLOWED BY** piperacillin-tazobactam (ZOSYN) 4.5 g in sodium chloride 0.9 % 100 mL IVPB (EXTENDED INFUSION), Q8H, Last Rate: 4.5 g (01/08/25 1337)    potassium chloride (Klor-Con M20) CR tablet 20 mEq, Daily    predniSONE tablet 5 mg, BID With Meals    tamsulosin (FLOMAX) capsule 0.4 mg, Daily With Dinner    venlafaxine (EFFEXOR) tablet 25 mg, Daily    Administrative Statements   Today, Patient Was Seen By: Kelli Pennington MD      **Please Note: This note may have been constructed using a voice recognition system.**

## 2025-01-08 NOTE — ASSESSMENT & PLAN NOTE
"Recent Labs     01/07/25  0505 01/08/25  0644   SODIUM 137 142     No results found for: \"OSMOUA\", \"NAUR\", \"OSMOLALITSER\"    Resolved    "

## 2025-01-08 NOTE — ASSESSMENT & PLAN NOTE
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  Received r sided single shot femoral and intragluteal sciatic blocks with exparel 1/8    Multimodal analgesia:  Tylenol 975 mg every 8 hours scheduled  Gabapentin 600 mg 2 times daily, home medication  Estimated Creatinine Clearance: 107.2 mL/min (by C-G formula based on SCr of 0.64 mg/dL).  Robaxin 500 mg every 8 hours, home medication  venlafaxine 25 mg daily as second line agent for neuropathic pain  Oxycodone 5 mg every 4 hours as needed for moderate pain  Oxycodone 10 mg every 4 hours as needed for severe pain  IV dilaudid 0.2 mg every 4 hours as needed for breakthrough pain  Narcan as needed for respiratory depression/opioid reversal     If pain worsens significantly overnight can consider increasing dose of IV dilaudid to 0.5 mg q2 hrs PRN for breakthrough pain.

## 2025-01-09 ENCOUNTER — DOCUMENTATION (OUTPATIENT)
Dept: VASCULAR SURGERY | Facility: CLINIC | Age: 80
End: 2025-01-09

## 2025-01-09 LAB
ABO GROUP BLD BPU: NORMAL
ABO GROUP BLD BPU: NORMAL
ALBUMIN SERPL BCG-MCNC: 3.1 G/DL (ref 3.5–5)
ALP SERPL-CCNC: 94 U/L (ref 34–104)
ALT SERPL W P-5'-P-CCNC: 57 U/L (ref 7–52)
ANION GAP SERPL CALCULATED.3IONS-SCNC: 11 MMOL/L (ref 4–13)
AST SERPL W P-5'-P-CCNC: 27 U/L (ref 13–39)
BASOPHILS # BLD AUTO: 0.05 THOUSANDS/ΜL (ref 0–0.1)
BASOPHILS NFR BLD AUTO: 0 % (ref 0–1)
BILIRUB SERPL-MCNC: 0.45 MG/DL (ref 0.2–1)
BPU ID: NORMAL
BPU ID: NORMAL
BUN SERPL-MCNC: 12 MG/DL (ref 5–25)
CALCIUM ALBUM COR SERPL-MCNC: 8.1 MG/DL (ref 8.3–10.1)
CALCIUM SERPL-MCNC: 7.4 MG/DL (ref 8.4–10.2)
CHLORIDE SERPL-SCNC: 109 MMOL/L (ref 96–108)
CO2 SERPL-SCNC: 21 MMOL/L (ref 21–32)
CREAT SERPL-MCNC: 0.77 MG/DL (ref 0.6–1.3)
CROSSMATCH: NORMAL
CROSSMATCH: NORMAL
EOSINOPHIL # BLD AUTO: 0.2 THOUSAND/ΜL (ref 0–0.61)
EOSINOPHIL NFR BLD AUTO: 1 % (ref 0–6)
ERYTHROCYTE [DISTWIDTH] IN BLOOD BY AUTOMATED COUNT: 14.7 % (ref 11.6–15.1)
GFR SERPL CREATININE-BSD FRML MDRD: 86 ML/MIN/1.73SQ M
GLUCOSE SERPL-MCNC: 88 MG/DL (ref 65–140)
HCT VFR BLD AUTO: 27.2 % (ref 36.5–49.3)
HGB BLD-MCNC: 8.1 G/DL (ref 12–17)
IMM GRANULOCYTES # BLD AUTO: 0.21 THOUSAND/UL (ref 0–0.2)
IMM GRANULOCYTES NFR BLD AUTO: 2 % (ref 0–2)
LYMPHOCYTES # BLD AUTO: 0.89 THOUSANDS/ΜL (ref 0.6–4.47)
LYMPHOCYTES NFR BLD AUTO: 6 % (ref 14–44)
MAGNESIUM SERPL-MCNC: 1.8 MG/DL (ref 1.9–2.7)
MCH RBC QN AUTO: 29.2 PG (ref 26.8–34.3)
MCHC RBC AUTO-ENTMCNC: 29.8 G/DL (ref 31.4–37.4)
MCV RBC AUTO: 98 FL (ref 82–98)
MONOCYTES # BLD AUTO: 0.86 THOUSAND/ΜL (ref 0.17–1.22)
MONOCYTES NFR BLD AUTO: 6 % (ref 4–12)
NEUTROPHILS # BLD AUTO: 11.79 THOUSANDS/ΜL (ref 1.85–7.62)
NEUTS SEG NFR BLD AUTO: 85 % (ref 43–75)
NRBC BLD AUTO-RTO: 0 /100 WBCS
PHOSPHATE SERPL-MCNC: 2.6 MG/DL (ref 2.3–4.1)
PLATELET # BLD AUTO: 488 THOUSANDS/UL (ref 149–390)
PMV BLD AUTO: 9.9 FL (ref 8.9–12.7)
POTASSIUM SERPL-SCNC: 3.9 MMOL/L (ref 3.5–5.3)
PROT SERPL-MCNC: 6.1 G/DL (ref 6.4–8.4)
RBC # BLD AUTO: 2.77 MILLION/UL (ref 3.88–5.62)
SODIUM SERPL-SCNC: 141 MMOL/L (ref 135–147)
UNIT DISPENSE STATUS: NORMAL
UNIT DISPENSE STATUS: NORMAL
UNIT PRODUCT CODE: NORMAL
UNIT PRODUCT CODE: NORMAL
UNIT PRODUCT VOLUME: 350 ML
UNIT PRODUCT VOLUME: 350 ML
UNIT RH: NORMAL
UNIT RH: NORMAL
WBC # BLD AUTO: 14 THOUSAND/UL (ref 4.31–10.16)

## 2025-01-09 PROCEDURE — 85025 COMPLETE CBC W/AUTO DIFF WBC: CPT

## 2025-01-09 PROCEDURE — 83735 ASSAY OF MAGNESIUM: CPT

## 2025-01-09 PROCEDURE — 80053 COMPREHEN METABOLIC PANEL: CPT | Performed by: FAMILY MEDICINE

## 2025-01-09 PROCEDURE — 99024 POSTOP FOLLOW-UP VISIT: CPT | Performed by: SURGERY

## 2025-01-09 PROCEDURE — 99232 SBSQ HOSP IP/OBS MODERATE 35: CPT

## 2025-01-09 PROCEDURE — 99232 SBSQ HOSP IP/OBS MODERATE 35: CPT | Performed by: FAMILY MEDICINE

## 2025-01-09 PROCEDURE — 97164 PT RE-EVAL EST PLAN CARE: CPT

## 2025-01-09 PROCEDURE — 84100 ASSAY OF PHOSPHORUS: CPT

## 2025-01-09 PROCEDURE — 97168 OT RE-EVAL EST PLAN CARE: CPT

## 2025-01-09 RX ORDER — LOPERAMIDE HYDROCHLORIDE 2 MG/1
2 CAPSULE ORAL 3 TIMES DAILY PRN
Status: CANCELLED | OUTPATIENT
Start: 2025-01-09

## 2025-01-09 RX ADMIN — VENLAFAXINE 25 MG: 25 TABLET ORAL at 08:26

## 2025-01-09 RX ADMIN — PIPERACILLIN SODIUM AND TAZOBACTAM SODIUM 4.5 G: 36; 4.5 INJECTION, POWDER, LYOPHILIZED, FOR SOLUTION INTRAVENOUS at 14:39

## 2025-01-09 RX ADMIN — EZETIMIBE 10 MG: 10 TABLET ORAL at 08:20

## 2025-01-09 RX ADMIN — CLOPIDOGREL BISULFATE 75 MG: 75 TABLET, FILM COATED ORAL at 08:19

## 2025-01-09 RX ADMIN — GABAPENTIN 600 MG: 300 CAPSULE ORAL at 17:04

## 2025-01-09 RX ADMIN — LOPERAMIDE HYDROCHLORIDE 2 MG: 2 CAPSULE ORAL at 19:33

## 2025-01-09 RX ADMIN — PENTOXIFYLLINE 400 MG: 400 TABLET, EXTENDED RELEASE ORAL at 08:25

## 2025-01-09 RX ADMIN — LOPERAMIDE HYDROCHLORIDE 2 MG: 2 CAPSULE ORAL at 13:40

## 2025-01-09 RX ADMIN — TAMSULOSIN HYDROCHLORIDE 0.4 MG: 0.4 CAPSULE ORAL at 17:04

## 2025-01-09 RX ADMIN — PREDNISONE 5 MG: 5 TABLET ORAL at 08:19

## 2025-01-09 RX ADMIN — METHOCARBAMOL 500 MG: 500 TABLET ORAL at 21:54

## 2025-01-09 RX ADMIN — METHOCARBAMOL 500 MG: 500 TABLET ORAL at 17:04

## 2025-01-09 RX ADMIN — ALLOPURINOL 100 MG: 100 TABLET ORAL at 08:20

## 2025-01-09 RX ADMIN — FAMOTIDINE 20 MG: 20 TABLET, FILM COATED ORAL at 08:21

## 2025-01-09 RX ADMIN — FERROUS SULFATE TAB 325 MG (65 MG ELEMENTAL FE) 325 MG: 325 (65 FE) TAB at 08:21

## 2025-01-09 RX ADMIN — PENTOXIFYLLINE 400 MG: 400 TABLET, EXTENDED RELEASE ORAL at 17:07

## 2025-01-09 RX ADMIN — ASPIRIN 81 MG CHEWABLE TABLET 81 MG: 81 TABLET CHEWABLE at 08:20

## 2025-01-09 RX ADMIN — PIPERACILLIN SODIUM AND TAZOBACTAM SODIUM 4.5 G: 36; 4.5 INJECTION, POWDER, LYOPHILIZED, FOR SOLUTION INTRAVENOUS at 06:24

## 2025-01-09 RX ADMIN — GABAPENTIN 600 MG: 300 CAPSULE ORAL at 08:25

## 2025-01-09 RX ADMIN — PIPERACILLIN SODIUM AND TAZOBACTAM SODIUM 4.5 G: 36; 4.5 INJECTION, POWDER, LYOPHILIZED, FOR SOLUTION INTRAVENOUS at 21:50

## 2025-01-09 RX ADMIN — ATORVASTATIN CALCIUM 80 MG: 80 TABLET, FILM COATED ORAL at 17:04

## 2025-01-09 RX ADMIN — ACETAMINOPHEN 975 MG: 325 TABLET, FILM COATED ORAL at 21:54

## 2025-01-09 RX ADMIN — METHOCARBAMOL 500 MG: 500 TABLET ORAL at 08:20

## 2025-01-09 RX ADMIN — PENTOXIFYLLINE 400 MG: 400 TABLET, EXTENDED RELEASE ORAL at 13:29

## 2025-01-09 RX ADMIN — ONDANSETRON 4 MG: 2 INJECTION INTRAMUSCULAR; INTRAVENOUS at 10:04

## 2025-01-09 RX ADMIN — ENOXAPARIN SODIUM 40 MG: 40 INJECTION SUBCUTANEOUS at 08:21

## 2025-01-09 RX ADMIN — POTASSIUM CHLORIDE 20 MEQ: 1500 TABLET, EXTENDED RELEASE ORAL at 08:21

## 2025-01-09 RX ADMIN — ACETAMINOPHEN 975 MG: 325 TABLET, FILM COATED ORAL at 13:28

## 2025-01-09 RX ADMIN — FINASTERIDE 5 MG: 5 TABLET, FILM COATED ORAL at 08:20

## 2025-01-09 RX ADMIN — PREDNISONE 5 MG: 5 TABLET ORAL at 17:04

## 2025-01-09 NOTE — PLAN OF CARE
Problem: OCCUPATIONAL THERAPY ADULT  Goal: Performs self-care activities at highest level of function for planned discharge setting.  See evaluation for individualized goals.  Description: Treatment Interventions: ADL retraining, Functional transfer training, UE strengthening/ROM, Endurance training, Cognitive reorientation, Patient/family training, Equipment evaluation/education, Compensatory technique education, Continued evaluation, Energy conservation, Activityengagement          See flowsheet documentation for full assessment, interventions and recommendations.   Outcome: Progressing  Note: Limitation: Decreased ADL status, Decreased UE strength, Decreased Safe judgement during ADL, Decreased cognition, Decreased endurance, Decreased self-care trans, Decreased high-level ADLs  Prognosis: Good  Assessment: Pt seen for OT re-evaluation as pt is now s/p R AKA. Please refer to IE for more detailed information regarding pt's PLOF/social history. PTA, pt was I for ADLs/IADLs. Upon re-evaluation, pt currently requires MOD A x 2 for transfers and mobility. Pt currently requires S eating and grooming, S UB ADLs, MAX A LB ADLs, and MAX A toileting. Pt is limited at this time 2*: pain, endurance, activity tolerance, functional mobility, balance, functional standing tolerance, decreased I w/ ADLS/IADLS, strength, cognitive impairments, and decreased safety awareness.The following Occupational Performance Areas to address include: eating, grooming, bathing/shower, toilet hygiene, dressing, functional mobility, community mobility, and clothing management. Pt to benefit from immediate acute skilled OT to address above deficits, improve overall functional independence, maximize fnxl mobility and reduce caregiver burden.  From OT standpoint, recommendation at time of d/c would be STR.  Pt was left after session with all current needs met. The patient's raw score on the AM-PAC Daily Activity Inpatient Short Form is 15. A raw  score of less than 19 suggests the patient may benefit from discharge to post-acute rehabilitation services. Please refer to the recommendation of the Occupational Therapist for safe discharge planning.     Rehab Resource Intensity Level, OT: I (Maximum Resource Intensity)

## 2025-01-09 NOTE — OCCUPATIONAL THERAPY NOTE
Occupational Therapy Evaluation     Patient Name: Royce Rene  Today's Date: 1/9/2025  Problem List  Principal Problem:    PAD (peripheral artery disease) ([facility])  Active Problems:    Chronic combined systolic and diastolic CHF (congestive heart failure) ([facility])    Ischemic cardiomyopathy    Prostate cancer ([facility])    CAD (coronary artery disease)    Urinary retention    Ambulatory dysfunction    Ulcer of left foot, limited to breakdown of skin ([facility])    Mixed hyperlipidemia    Hyponatremia    Diarrhea    Right ankle pain    Iron deficiency anemia    History of transcatheter aortic valve replacement (TAVR)    Encounter for preoperative assessment for noncoronary cardiac surgery    Past Medical History  Past Medical History:   Diagnosis Date    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    Coronary artery disease 2001    S/p stenting to circumflex and RCA    HFrEF (heart failure with reduced ejection fraction) ([facility]) 06/14/2021    High cholesterol     Pacemaker     Prostate cancer ([facility])      Past Surgical History  Past Surgical History:   Procedure Laterality Date    AMPUTATION ABOVE KNEE (AKA) Right 1/8/2025    Procedure: RIGHT AMPUTATION ABOVE KNEE (AKA);  Surgeon: Serina Cook DO;  Location: [facility];  Service: Vascular    CARDIAC CATHETERIZATION      CARDIAC ELECTROPHYSIOLOGY PROCEDURE N/A 12/23/2021    Procedure: Implant ICD - bi ventricular;  Surgeon: Jonas Oviedo MD;  Location: [facility] CARD[facility]AC C[facility];  Service: Cardiology    COLONOSCOPY      IR LOWER EXTREMITY ANGIOGRAM  04/18/2023    IR LOWER EXTREMITY ANGIOGRAM  10/30/2024    IR LOWER EXTREMITY ANGIOGRAM  1/7/2025    HI TEAEC W/WO PATCH GRAFT COMMON FEMORAL Right 07/09/2021    Procedure: ENDARTERECTOMY ARTERIAL FEMORAL;  Surgeon: Serina Cook DO;  Location: [facility];  Service: Vascular        01/09/25 1416   OT Last Visit   OT Visit Date 01/09/25   Note Type   Note type Re-Evaluation   Pain Assessment   Pain Assessment Tool 0-10   Pain Score 8   Pain  "Location/Orientation Orientation: Right;Location: Leg   Hospital Pain Intervention(s) Repositioned;Ambulation/increased activity   Restrictions/Precautions   Weight Bearing Precautions Per Order Yes   RLE Weight Bearing Per Order (S)  NWB  (s/p AKA)   LLE Weight Bearing Per Order (S)  WBAT  (in sx shoe)   Braces or Orthoses Other (Comment)  (LLE sx shoe)   Other Precautions Cognitive;Chair Alarm;Bed Alarm;Multiple lines;Fall Risk;Pain;WBS   Home Living   Type of Home Apartment   Home Layout One level   Bathroom Shower/Tub Tub/shower unit   Bathroom Toilet Raised   Bathroom Equipment Grab bars in shower;Shower chair   Home Equipment Walker;Cane;Wheelchair-manual;Electric scooter   Additional Comments Pt reports living in apt complex. has 0STE, then stair glide, then ~ 5 steps to access his level   Prior Function   Level of [place] Independent with ADLs;Independent with IADLS   Lives With (S)  Alone   Receives Help From Family   IADLs Family/Friend/Other provides transportation;Independent with meal prep;Independent with medication management   Falls in the last 6 months 1 to 4  (2 per pt report)   Vocational Retired   Comments Pt reports he lives alone in his apt; son lives on the 3rd floor   Lifestyle   Autonomy PTA, pt reports being I with ADLS, IADLS, SPT into electric scooter, (-)    Reciprocal Relationships Supportive son who lives in the same apt complex. Pt reports son does work M-W during the day   Service to Others Retired   Intrinsic Gratification Enjoys watching sports - Arav   Subjective   Subjective \"I've been getting the runs alot\"   ADL   Where Assessed Edge of bed   Eating Assistance 5  Supervision/Setup   Grooming Assistance 5  Supervision/Setup   UB Bathing Assistance 5  Supervision/Setup   LB Bathing Assistance 2  Maximal Assistance   UB Dressing Assistance 5  Supervision/Setup   LB Dressing Assistance 2  Maximal Assistance   Toileting Assistance  2  Maximal Assistance   Bed " Mobility   Supine to Sit 3  Moderate assistance   Additional items Assist x 1;HOB elevated;Bedrails;Increased time required   Sit to Supine 5  Supervision   Additional items Bedrails;HOB elevated;Increased time required   Transfers   Sit to Stand 3  Moderate assistance   Additional items Assist x 2;Increased time required   Stand to Sit 3  Moderate assistance   Additional items Assist x 2;Increased time required   Additional Comments transfers with B/L HHA   Functional Mobility   Additional Comments Unable to perform at this time   Balance   Static Sitting Fair   Dynamic Sitting Fair -   Static Standing Poor   Dynamic Standing Poor   Activity Tolerance   Activity Tolerance Patient limited by fatigue;Patient limited by pain   Medical Staff Made Aware PT Kyra   Nurse Made Aware RN clearance for session   RUE Assessment   RUE Assessment WFL   LUE Assessment   LUE Assessment WFL   Cognition   Overall Cognitive Status Impaired   Arousal/Participation Responsive;Cooperative   Attention Attends with cues to redirect   Orientation Level Oriented X4   Memory Decreased recall of precautions   Following Commands Follows one step commands with increased time or repetition   Comments Pt cooperative during session   Assessment   Limitation Decreased ADL status;Decreased UE strength;Decreased Safe judgement during ADL;Decreased cognition;Decreased endurance;Decreased self-care trans;Decreased high-level ADLs   Prognosis Good   Assessment Pt seen for OT re-evaluation as pt is now s/p R AKA. Please refer to IE for more detailed information regarding pt's PLOF/social history. PTA, pt was I for ADLs/IADLs. Upon re-evaluation, pt currently requires MOD A x 2 for transfers and mobility. Pt currently requires S eating and grooming, S UB ADLs, MAX A LB ADLs, and MAX A toileting. Pt is limited at this time 2*: pain, endurance, activity tolerance, functional mobility, balance, functional standing tolerance, decreased I w/ ADLS/IADLS,  strength, cognitive impairments, and decreased safety awareness.The following Occupational Performance Areas to address include: eating, grooming, bathing/shower, toilet hygiene, dressing, functional mobility, community mobility, and clothing management. Pt to benefit from immediate acute skilled OT to address above deficits, improve overall functional independence, maximize fnxl mobility and reduce caregiver burden.  From OT standpoint, recommendation at time of d/c would be STR.  Pt was left after session with all current needs met. The patient's raw score on the AM-PAC Daily Activity Inpatient Short Form is 15. A raw score of less than 19 suggests the patient may benefit from discharge to post-acute rehabilitation services. Please refer to the recommendation of the Occupational Therapist for safe discharge planning.   Goals   LT Time Frame 10-14   Plan   Treatment Interventions ADL retraining;Functional transfer training;UE strengthening/ROM;Endurance training;Cognitive reorientation;Patient/family training;Equipment evaluation/education;Compensatory technique education;Continued evaluation;Activityengagement;Energy conservation   Goal Expiration Date 01/17/25   OT Treatment Day 1   OT Frequency 2-3x/wk   Discharge Recommendation   Rehab Resource Intensity Level, OT I (Maximum Resource Intensity)   AM-PAC Daily Activity Inpatient   Lower Body Dressing 2   Bathing 2   Toileting 2   Upper Body Dressing 3   Grooming 3   Eating 3   Daily Activity Raw Score 15   Daily Activity Standardized Score (Calc for Raw Score >=11) 34.69   AM-PAC Applied Cognition Inpatient   Following a Speech/Presentation 3   Understanding Ordinary Conversation 4   Taking Medications 3   Remembering Where Things Are Placed or Put Away 3   Remembering List of 4-5 Errands 3   Taking Care of Complicated Tasks 3   Applied Cognition Raw Score 19   Applied Cognition Standardized Score 39.77     GOALS    - Pt will complete UB dressing/self  care/bathing w/ mod I using adaptive device and DME as needed    - Pt will complete LB dressing/self care/bathing w/ mod I using adaptive device and DME as needed    - Pt will complete toileting w/ mod I w/ G hygiene/thoroughness using DME as needed    - Pt will improve functional transfers to Mod I on/off all surfaces using DME as needed w/ G balance/safety     - Pt will improve functional mobility/WC mobility during ADL/IADL/leisure tasks to Mod I using DME as needed w/ G balance/safety     - Pt will demonstrate G carryover of pt/caregiver education and training as appropriate.    - Pt will demonstrate 100% carryover of energy conservation techniques t/o functional I/ADL/leisure tasks w/o cues s/p skilled education    - Pt will engage in ongoing cognitive assessment w/ G participation to assist w/ safe d/c planning/recommendations    - Pt will increase static and dynamic standing/sitting balance to F+  using AD/DME as needed to increase safety and I during fnxl transfers and ADLs    - Pt will maintain upright sitting position for at least 20 min during dynamic fnxl activity with F+  balance and endurance to improve ADL performance and independence, as well as reduce risk of falls     - Pt will participate in simulated IADL management task with DME as needed to increase independence w/ G safety and endurance      Elva Baca MS, OTR/L

## 2025-01-09 NOTE — ASSESSMENT & PLAN NOTE
Patient is a 78yo male who presented with worsening right foot wound with ischemic changes and findings concerning for wet gangrene. 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 For his left foot wound, He is now s/p LLE agram, shockwave CFA, SFA, TPT, PT via R groin puncture on 1/7. He is overall doing well and is hemodynamically stable.     -Regular diet  -Will complete stump check 1/10  -Trend WBC and fever curve  -Continue Zosyn  -pain and nausea control as needed  -Continue ASA, Plavix, statin  -APS consultation, appreciate recommendations  -rest of care per primary team

## 2025-01-09 NOTE — PROGRESS NOTES
Progress Note - Vascular Surgery   Name: Royce Rene 79 y.o. male I MRN: [id number]  Unit/Bed#: [facility] 523-01 I Date of Admission: 1/1/2025   Date of Service: 1/9/2025 I Hospital Day: 8    Assessment & Plan  PAD (peripheral artery disease) (HCC)  Patient is a 78yo male who presented with worsening right foot wound with ischemic changes and findings concerning for wet gangrene. 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 For his left foot wound, He is now s/p LLE agram, shockwave CFA, SFA, TPT, PT via R groin puncture on 1/7. He is overall doing well and is hemodynamically stable.     -Regular diet  -Will complete stump check 1/10  -Trend WBC and fever curve  -Continue Zosyn  -pain and nausea control as needed  -Continue ASA, Plavix, statin  -APS consultation, appreciate recommendations  -rest of care per primary team    24 Hour Events : No acute events  Subjective : Reports pain is well controlled. Denies any nausea, vomiting, fevers, chills, chest pain, shortness of breath. Tolerating a diet.     Objective :  Temp:  [97.7 °F (36.5 °C)-98.4 °F (36.9 °C)] 97.7 °F (36.5 °C)  HR:  [] 87  BP: ()/() 100/61  Resp:  [15-26] 18  SpO2:  [87 %-98 %] 88 %  O2 Device: None (Room air)  Nasal Cannula O2 Flow Rate (L/min):  [2 L/min-6 L/min] 2 L/min     I/O         01/07 0701  01/08 0700 01/08 0701  01/09 0700 01/09 0701  01/10 0700    P.O. 600      I.V. (mL/kg) 400 (4) 200 (2.1)     IV Piggyback 100      Total Intake(mL/kg) 1100 (11) 200 (2.1)     Urine (mL/kg/hr) 845 (0.4) 500 (0.2)     Stool 0      Total Output 845 500     Net +255 -300            Unmeasured Stool Occurrence 2 x            Lines/Drains/Airways       Active Status       Name Placement date Placement time Site Days    Urethral Catheter Straight-tip;Non-latex 16 Fr. 01/02/25  1611  Straight-tip;Non-latex  6                  Physical Exam  Constitutional:       Appearance: Normal appearance.   HENT:      Head: Normocephalic and atraumatic.      Right Ear: External ear  "normal.      Left Ear: External ear normal.      Nose: Nose normal.   Eyes:      Conjunctiva/sclera: Conjunctivae normal.   Cardiovascular:      Rate and Rhythm: Normal rate.      Comments: Appears well perfused  Pulmonary:      Effort: Pulmonary effort is normal. No respiratory distress.   Abdominal:      General: Abdomen is flat.   Musculoskeletal:      Comments: Left foot with dressings CDI. R AKA stump with dressing CDI, no strikethrough. Does have mild, appropriate tenderness of the stump and of the anterior thigh.    Skin:     General: Skin is warm and dry.   Neurological:      Mental Status: He is alert and oriented to person, place, and time.   Psychiatric:         Mood and Affect: Mood normal.         Behavior: Behavior normal.       Pulse exam:  LLE: DP/PT signals      Lab Results: I have reviewed the following results:  CBC with diff:   Lab Results   Component Value Date    WBC 14.00 (H) 01/09/2025    HGB 8.1 (L) 01/09/2025    HCT 27.2 (L) 01/09/2025    MCV 98 01/09/2025     (H) 01/09/2025    RBC 2.77 (L) 01/09/2025    MCH 29.2 01/09/2025    MCHC 29.8 (L) 01/09/2025    RDW 14.7 01/09/2025    MPV 9.9 01/09/2025    NRBC 0 01/09/2025   ,   BMP/CMP:  Lab Results   Component Value Date    SODIUM 141 01/09/2025    K 3.9 01/09/2025     (H) 01/09/2025    CO2 21 01/09/2025    BUN 12 01/09/2025    CREATININE 0.77 01/09/2025    CALCIUM 7.4 (L) 01/09/2025    AST 27 01/09/2025    ALT 57 (H) 01/09/2025    ALKPHOS 94 01/09/2025    EGFR 86 01/09/2025   ,   Lipid Panel: No results found for: \"CHOL\",   Coags:   Lab Results   Component Value Date    PTT 37 04/03/2023    INR 1.10 01/03/2025   ,   Blood Culture:   Lab Results   Component Value Date    BLOODCX No Growth After 5 Days. 04/03/2023   ,   Urinalysis:   Lab Results   Component Value Date    COLORU Yellow 01/18/2024    CLARITYU Clear 01/18/2024    SPECGRAV 1.020 01/18/2024    PHUR 5.5 01/18/2024    PHUR 5.5 02/14/2022    LEUKOCYTESUR Negative 01/18/2024 " "   NITRITE Negative 01/18/2024    GLUCOSEU 3+ (A) 01/18/2024    KETONESU Negative 01/18/2024    BILIRUBINUR Negative 01/18/2024    BLOODU Negative 01/18/2024   ,   Urine Culture: No results found for: \"URINECX\",   Wound Culure:   Lab Results   Component Value Date    WOUNDCULT 1+ Growth of 12/12/2024       Imaging Results Review: No pertinent imaging studies reviewed.  Other Study Results Review: No additional pertinent studies reviewed.    VTE Prophylaxis: Enoxaparin (Lovenox)  "

## 2025-01-09 NOTE — PLAN OF CARE
Problem: PAIN - ADULT  Goal: Verbalizes/displays adequate comfort level or baseline comfort level  Description: Interventions:  - Encourage patient to monitor pain and request assistance  - Assess pain using appropriate pain scale  - Administer analgesics based on type and severity of pain and evaluate response  - Implement non-pharmacological measures as appropriate and evaluate response  - Consider cultural and social influences on pain and pain management  - Notify physician/advanced practitioner if interventions unsuccessful or patient reports new pain  Outcome: Progressing     Problem: INFECTION - ADULT  Goal: Absence or prevention of progression during hospitalization  Description: INTERVENTIONS:  - Assess and monitor for signs and symptoms of infection  - Monitor lab/diagnostic results  - Monitor all insertion sites, i.e. indwelling lines, tubes, and drains  - Monitor endotracheal if appropriate and nasal secretions for changes in amount and color  - [place] appropriate cooling/warming therapies per order  - Administer medications as ordered  - Instruct and encourage patient and family to use good hand hygiene technique  - Identify and instruct in appropriate isolation precautions for identified infection/condition  Outcome: Progressing  Goal: Absence of fever/infection during neutropenic period  Description: INTERVENTIONS:  - Monitor WBC    Outcome: Progressing     Problem: SAFETY ADULT  Goal: Patient will remain free of falls  Description: INTERVENTIONS:  - Educate patient/family on patient safety including physical limitations  - Instruct patient to call for assistance with activity   - Consult OT/PT to assist with strengthening/mobility   - Keep Call bell within reach  - Keep bed low and locked with side rails adjusted as appropriate  - Keep care items and personal belongings within reach  - Initiate and maintain comfort rounds  - Make Fall Risk Sign visible to staff  - Offer Toileting every  Hours,  in advance of need  - Initiate/Maintain alarm  - Obtain necessary fall risk management equipment:   - Apply yellow socks and bracelet for high fall risk patients  - Consider moving patient to room near nurses station  Outcome: Progressing  Goal: Maintain or return to baseline ADL function  Description: INTERVENTIONS:  -  Assess patient's ability to carry out ADLs; assess patient's baseline for ADL function and identify physical deficits which impact ability to perform ADLs (bathing, care of mouth/teeth, toileting, grooming, dressing, etc.)  - Assess/evaluate cause of self-care deficits   - Assess range of motion  - Assess patient's mobility; develop plan if impaired  - Assess patient's need for assistive devices and provide as appropriate  - Encourage maximum independence but intervene and supervise when necessary  - Involve family in performance of ADLs  - Assess for home care needs following discharge   - Consider OT consult to assist with ADL evaluation and planning for discharge  - Provide patient education as appropriate  Outcome: Progressing  Goal: Maintains/Returns to pre admission functional level  Description: INTERVENTIONS:  - Perform AM-PAC 6 Click Basic Mobility/ Daily Activity assessment daily.  - Set and communicate daily mobility goal to care team and patient/family/caregiver.   - Collaborate with rehabilitation services on mobility goals if consulted  - Perform Range of Motion  times a day.  - Reposition patient every  hours.  - Dangle patient  times a day  - Stand patient  times a day  - Ambulate patient  times a day  - Out of bed to chair  times a day   - Out of bed for meals  times a day  - Out of bed for toileting  - Record patient progress and toleration of activity level   Outcome: Progressing     Problem: DISCHARGE PLANNING  Goal: Discharge to home or other facility with appropriate resources  Description: INTERVENTIONS:  - Identify barriers to discharge w/patient and caregiver  - Arrange for  needed discharge resources and transportation as appropriate  - Identify discharge learning needs (meds, wound care, etc.)  - Arrange for interpretive services to assist at discharge as needed  - Refer to Case Management Department for coordinating discharge planning if the patient needs post-hospital services based on physician/advanced practitioner order or complex needs related to functional status, cognitive ability, or social support system  Outcome: Progressing     Problem: Knowledge Deficit  Goal: Patient/family/caregiver demonstrates understanding of disease process, treatment plan, medications, and discharge instructions  Description: Complete learning assessment and assess knowledge base.  Interventions:  - Provide teaching at level of understanding  - Provide teaching via preferred learning methods  Outcome: Progressing     Problem: Prexisting or High Potential for Compromised Skin Integrity  Goal: Skin integrity is maintained or improved  Description: INTERVENTIONS:  - Identify patients at risk for skin breakdown  - Assess and monitor skin integrity  - Assess and monitor nutrition and hydration status  - Monitor labs   - Assess for incontinence   - Turn and reposition patient  - Assist with mobility/ambulation  - Relieve pressure over bony prominences  - Avoid friction and shearing  - Provide appropriate hygiene as needed including keeping skin clean and dry  - Evaluate need for skin moisturizer/barrier cream  - Collaborate with interdisciplinary team   - Patient/family teaching  - Consider wound care consult   Outcome: Progressing     Problem: Nutrition/Hydration-ADULT  Goal: Nutrient/Hydration intake appropriate for improving, restoring or maintaining nutritional needs  Description: Monitor and assess patient's nutrition/hydration status for malnutrition. Collaborate with interdisciplinary team and initiate plan and interventions as ordered.  Monitor patient's weight and dietary intake as ordered or  per policy. Utilize nutrition screening tool and intervene as necessary. Determine patient's food preferences and provide high-protein, high-caloric foods as appropriate.     INTERVENTIONS:  - Monitor oral intake, urinary output, labs, and treatment plans  - Assess nutrition and hydration status and recommend course of action  - Evaluate amount of meals eaten  - Assist patient with eating if necessary   - Allow adequate time for meals  - Recommend/ encourage appropriate diets, oral nutritional supplements, and vitamin/mineral supplements  - Order, calculate, and assess calorie counts as needed  - Recommend, monitor, and adjust tube feedings and TPN/PPN based on assessed needs  - Assess need for intravenous fluids  - Provide specific nutrition/hydration education as appropriate  - Include patient/family/caregiver in decisions related to nutrition  Outcome: Progressing

## 2025-01-09 NOTE — PHYSICAL THERAPY NOTE
Physical Therapy Evaluation     Patient's Name: Royce Rene    Admitting Diagnosis  Generalized weakness [R53.1]    Problem List  Patient Active Problem List   Diagnosis    Chronic combined systolic and diastolic CHF (congestive heart failure) (HCC)    S/P AVR    Anemia    Ischemic cardiomyopathy    Ischemic leg pain    Prostate cancer (HCC)    CAD (coronary artery disease)    Urinary retention    PAD (peripheral artery disease) (HCC)    Port-A-Cath in place    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)    Embolism and 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 (HCC)    Depression, recurrent (HCC)    Cancer-related pain    Palliative care patient    Ambulatory dysfunction    Peripheral neuropathy    Financial problems    Moderate vascular dementia (HCC)    Electrolyte imbalance    Class 1 obesity due to excess calories with body mass index (BMI) of 33.0 to 33.9 in adult    Elevated liver enzymes    Proctitis    Frail elder // vulnerable adult    Prevention of chemotherapy-induced neutropenia    Osteomyelitis (HCC)    Ischemic pain of foot at rest    Ulcer of right foot with fat layer exposed (HCC)    Ulcer of left foot, limited to breakdown of skin (HCC)    Dry gangrene (HCC)    Vitamin B12 deficiency    Mixed hyperlipidemia    Exudative age-related macular degeneration, left eye, with active choroidal neovascularization (HCC)    Facial lesion    Diabetic ulcer of toe of right foot associated with type 2 diabetes mellitus, with necrosis of bone (HCC)    Hyponatremia    Diarrhea    Right ankle pain    Iron deficiency anemia    Hyperuricemia    History of transcatheter aortic valve replacement (TAVR)    Encounter for preoperative assessment for noncoronary cardiac surgery       Past Medical History  Past Medical History:   Diagnosis Date    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    Coronary artery disease 2001    S/p stenting to circumflex and RCA    HFrEF (heart failure with reduced ejection fraction) (HCC)  06/14/2021    High cholesterol     Pacemaker     Prostate cancer (HCC)        Past Surgical History  Past Surgical History:   Procedure Laterality Date    AMPUTATION ABOVE KNEE (AKA) Right 1/8/2025    Procedure: RIGHT AMPUTATION ABOVE KNEE (AKA);  Surgeon: Serina Cook DO;  Location: [facility];  Service: Vascular    CARDIAC CATHETERIZATION      CARDIAC ELECTROPHYSIOLOGY PROCEDURE N/A 12/23/2021    Procedure: Implant ICD - bi ventricular;  Surgeon: Jonas Oviedo MD;  Location: [facility] C[facility];  Service: Cardiology    COLONOSCOPY      IR LOWER EXTREMITY ANGIOGRAM  04/18/2023    IR LOWER EXTREMITY ANGIOGRAM  10/30/2024    IR LOWER EXTREMITY ANGIOGRAM  1/7/2025    MI TEAEC W/WO PATCH GRAFT COMMON FEMORAL Right 07/09/2021    Procedure: ENDARTERECTOMY ARTERIAL FEMORAL;  Surgeon: Serina Cook DO;  Location: [facility];  Service: Vascular        01/09/25 1417   PT Last Visit   PT Visit Date 01/09/25   Note Type   Note type Re-Evaluation  (s/p R AKA (1/8))   Additional Comments Patient being seen for re-eval s/p R AKA on 1/8. Patient previously able to ambulate 2' with b/l HHA to bedside chair. Patient is currently NWB R LE post AKA and WBAT L foot in surgical shoe (per podiatry).   End of Consult   Patient Position at End of Consult Supine;All needs within reach;Bed/Chair alarm activated   Pain Assessment   Pain Assessment Tool 0-10   Pain Score 8   Pain Location/Orientation Orientation: Right;Location: Leg   Pain Onset/Description Onset: Ongoing   Effect of Pain on Daily Activities limits functional mobility   Patient's Stated Pain Goal No pain   Hospital Pain Intervention(s) Repositioned;Ambulation/increased activity;Emotional support;Rest   Restrictions/Precautions   Weight Bearing Precautions Per Order Yes   RLE Weight Bearing Per Order (S)  NWB  (s/p R AKA 1/8)   LLE Weight Bearing Per Order (S)  WBAT  (surgical shoe)   Braces or Orthoses Other (Comment)  (L surgical shoe)   Other Precautions  "Cognitive;Chair Alarm;Bed Alarm;Multiple lines;Telemetry;Fall Risk;Pain;WBS  (s/p R AKA)   Home Living   Type of Home Apartment   Home Layout One level  (+ stair glide, 5-6 steps in the home to primary living area)   Bathroom Shower/Tub Tub/shower unit   Bathroom Toilet Raised   Bathroom Equipment Grab bars in shower;Shower chair;Toilet raiser   Bathroom Accessibility Accessible   Home Equipment Walker;Cane;Electric scooter  (uses electric scooter primarily for mobility)   Additional Comments SEE IE   Prior Function   Level of [place] Independent with ADLs;Independent with functional mobility;Needs assistance with IADLS   Lives With (S)  Alone  (son lives in the same building)   Receives Help From Family   IADLs Family/Friend/Other provides transportation;Family/Friend/Other provides meals;Independent with medication management   Falls in the last 6 months 1 to 4  (2 per patient)   Vocational Retired   Comments SEE IE   Cognition   Overall Cognitive Status Impaired   Arousal/Participation Alert   Attention Attends with cues to redirect   Orientation Level Oriented X4   Memory Decreased short term memory;Decreased recall of recent events;Decreased recall of precautions   Following Commands Follows one step commands with increased time or repetition   Comments patient pleasant and cooperative, fair insight of functional deficits, verbal cues for re-direction to task at hand   Subjective   Subjective \"I'm glad I can say I got out of bed today\"   RLE Assessment   RLE Assessment X  (s/p R AKA)   LLE Assessment   LLE Assessment   (4-/5 grossly)   Bed Mobility   Supine to Sit 3  Moderate assistance   Additional items Assist x 1;HOB elevated;Bedrails;Increased time required;Verbal cues;LE management   Sit to Supine 5  Supervision   Additional items HOB elevated;Bedrails;Increased time required;Verbal cues   Additional Comments patient found and left supine in bed with alarm and all needs met   Transfers   Sit to Stand " 3  Moderate assistance   Additional items Assist x 2;Increased time required;Verbal cues   Stand to Sit 3  Moderate assistance   Additional items Assist x 2;Increased time required;Verbal cues   Additional Comments b/l HHA   Ambulation/Elevation   Gait pattern Not appropriate;Not tested   Balance   Static Sitting Fair   Dynamic Sitting Fair -   Static Standing Poor   Dynamic Standing Poor -   Ambulatory Zero   Endurance Deficit   Endurance Deficit Yes   Endurance Deficit Description generalized weakness, fatigue, pain, s/p R AKA   Activity Tolerance   Activity Tolerance Patient limited by fatigue;Patient limited by pain   Medical Staff Made Aware OT Elva; re-evaluation was performed with an occupational therapist due to the patient's co-morbidities, clinically unstable presentation, and present impairments which are a regression from the patient's baseline.   Nurse Made Aware RN cleared   Assessment   Prognosis Fair   Problem List Decreased endurance;Impaired balance;Decreased mobility;Impaired judgement;Decreased safety awareness;Obesity;Decreased skin integrity;Pain;Decreased range of motion;Decreased strength;Orthopedic restrictions   Assessment PT completed re-evaluation of 79 y.o. male admitted to [facility] on 1/1/2025 with PAD (peripheral artery disease) ([facility]).  Patient being seen for re-eval s/p R AKA on 1/8. Patient previously able to ambulate 2' with b/l HHA to bedside chair. Patient is currently NWB R LE post AKA and WBAT L foot in surgical shoe (per podiatry).     Patient presents at time of PT re-evaluation functioning below baseline and currently w/ overall mobility deficits due to: impaired balance, decreased endurance, gait dysfunction, decreased activity tolerance and fall risk. Patient is currently is requiring mod assist x1 for bed mobility skills;  mod assist x2 for functional transfers. Patient performed supine to sit to edge of bed requiring HOB elevated, increased time, verbal cues  for setup, use of bed rails, and trunk/LE management. Patient able to maintain static/dynamic balance at EOB ~5 mins with use of b/l UE for support and balance. Completed x1 STS with b/l HHA, able to maintain static stance ~2 mins prior to requiring a seated rest break due to fatigue. Patient declined further functional mobility at this time due to fatigue and pain.  Pt left supine in bed (per patient request) with alarm and all needs met.     This patient is functioning below their baseline and is recommended for level I. Patient will benefit from continued skilled PT this admission to achieve maximal function and safety. The patients AM-PAC Basic Mobility Inpatient Short From Raw Score is 9 . Based on AM-PAC scoring and patient presentation, PT currently recommending Level I (Maximum Resource Intensity). Please also refer to the recommendation of the Physical Therapist for safe discharge planning.   Barriers to Discharge Decreased caregiver support;Inaccessible home environment   Goals   Patient Goals to reduce pain   STG Expiration Date 01/23/25   Short Term Goal #1 1) Perform bed mobility mod-I to participate in frequent repositioning and improve skin integrity; 2) Perform functional transfers MinAx1 to promote I with toileting and OOB mobility; 3) Ambulate 20 feet MinAx1 with RW and LLE hops to participate in household level mobility and transfers; 4) Improve L LE strength by 1/2 grade in order to improve efficiency of tranfers; 5) Improve balance by 1 grade to reduce risk for falls; 6) Improve overall activity tolerance to 60 minutes in order to increase patient's ability to engage in mobility tasks; 7) Patient will propel w/c 200 feet, with adequate UE strength and activity tolerance to complete Mod-I. Patient will demonstrate the ability to manage w/c independently and safely.   PT Treatment Day 1   Plan   Treatment/Interventions ADL retraining;Functional transfer training;LE strengthening/ROM;Therapeutic  exercise;Endurance training;Patient/family training;Bed mobility;Gait training;Spoke to nursing;Spoke to case management;OT   PT Frequency 2-3x/wk   Discharge Recommendation   Rehab Resource Intensity Level, PT I (Maximum Resource Intensity)   Equipment Recommended Walker;Wheelchair   AM-PAC Basic Mobility Inpatient   Turning in Flat Bed Without Bedrails 3   Lying on Back to Sitting on Edge of Flat Bed Without Bedrails 2   Moving Bed to Chair 1   Standing Up From Chair Using Arms 1   Walk in Room 1   Climb 3-5 Stairs With Railing 1   Basic Mobility Inpatient Raw Score 9   Turning Head Towards Sound 4   Follow Simple Instructions 3   Low Function Basic Mobility Raw Score  16   Low Function Basic Mobility Standardized Score  25.72   [facility] Highest Level Of Mobility   -[facility] Goal 3: Sit at edge of bed   -HLM Achieved 5: Stand (1 or more minutes)   End of Consult   Patient Position at End of Consult Bedside chair;Bed/Chair alarm activated;All needs within reach           Kyra Ross, PT, DPT

## 2025-01-09 NOTE — CASE MANAGEMENT
Case Management Discharge Note    Patient name Royce BAER Free  Location [facility] 523/[facility] 523-01 MRN [id number]  : 1945 Date 2025       Current Admission Date: 2025  Current Admission Diagnosis:PAD (peripheral artery disease) (HCC)      LOS (days): 8  Geometric Mean LOS (GMLOS) (days):   Days to GMLOS:     OBJECTIVE:  Risk of Unplanned Readmission Score: 42.85   Current admission status: Inpatient   Primary Insurance: [place]  Secondary Insurance: [employer] [facility]    DISCHARGE DETAILS:  Discharge planning discussed with:: Patient  Freedom of Choice: Yes  Were Treatment Team discharge recommendations reviewed with patient/caregiver?: Yes  Did patient/caregiver verbalize understanding of patient care needs?: Yes  Were patient/caregiver advised of the risks associated with not following Treatment Team discharge recommendations?: Yes    Treatment Team Recommendation: Acute Rehab    Additional Comments: Pt is S/P Right AKA and is recommended for acute rehab placement for his aftercare plan. CM spoke to pt about this aftercare recommendation. Pt is agreeable w/ this recommendation. CM provided pt w/ freedom of choice for acute rehab providers. Pt was agreeable to have [facility]. CM made AIDIN referral to same. CM to follow.

## 2025-01-09 NOTE — RESTORATIVE TECHNICIAN NOTE
Restorative Technician Note      Patient Name: Royce Rene     Restorative Tech Visit Date: 01/09/25  Note Type: Mobility  Patient Position Upon Consult: Supine  Activity Performed: Repositioned  Patient Position at End of Consult: Supine; All needs within reach; Bed/Chair alarm activated

## 2025-01-09 NOTE — ASSESSMENT & PLAN NOTE
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  Robaxin 500 mg every 8 hours, home medication  venlafaxine 25 mg daily as second line agent for neuropathic pain  Oxycodone 5 mg every 4 hours as needed for moderate pain  Oxycodone 10 mg every 4 hours as needed for severe pain  IV dilaudid 0.2 mg every 4 hours as needed for breakthrough pain  If pain continues to be well controlled on current regimen would recommend discontinuation of IV opioids in next 24-48 hours  Narcan as needed for respiratory depression/opioid reversal

## 2025-01-09 NOTE — PROGRESS NOTES
Progress Note - Acute Pain   Name: Royce Rene 79 y.o. male I MRN: [id number]  [facility] I Date of Admission: 1/1/2025   Date of Service: 1/9/2025 I Hospital Day: 8    Assessment & Plan  PAD (peripheral artery disease) (HCC)  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  Received r sided single shot femoral and intragluteal sciatic blocks with exparel 1/8    Multimodal analgesia:  Tylenol 975 mg every 8 hours scheduled  Gabapentin 600 mg 2 times daily, home medication  Estimated Creatinine Clearance: 86.8 mL/min (by C-G formula based on SCr of 0.77 mg/dL).  Robaxin 500 mg every 8 hours, home medication  venlafaxine 25 mg daily as second line agent for neuropathic pain  Oxycodone 5 mg every 4 hours as needed for moderate pain  Oxycodone 10 mg every 4 hours as needed for severe pain  IV dilaudid 0.2 mg every 4 hours as needed for breakthrough pain  If pain continues to be well controlled on current regimen would recommend discontinuation of IV opioids in next 24-48 hours  Narcan as needed for respiratory depression/opioid reversal     Prostate cancer (HCC)  Patient follows with palliative care outpatient for symptom management of metastatic prostate cancer.    Outpatient analgesic regimen includes:  Acetaminophen 1000 mg 3 times daily as needed  Gabapentin 600 mg 2 times daily  Methocarbamol 500 mg 3 times daily  Oxycodone 5 mg 2 times daily as needed      APS will sign off at this time. Thank you for the consult. All opioids and other analgesics to be written at discretion of primary team. Please contact Acute Pain Service - via SecureChat from [phone] with additional questions or concerns. See SecureEarthineer or [employer] for additional contacts and after hours information.    Subjective   Royce Rene is a 79 y.o. male with significant past medical history including PAD with worsening right foot wound and gangrene. He is s/p right AKA 1/8/2025. APS consulted for  preoperative evaluation and postoperative pain control.     Pain History  Current pain location(s):  Pain Score: 8  Pain Location/Orientation: Orientation: Right, Location: Leg  Pain Scale: Pain Assessment Tool: 0-10  24 hour history: Seen at bedside this morning, does not appear to be in any significant distress.  Reports right stump pain is relatively well-controlled and feels oxycodone has been effective in alleviating pain.  He denies phantom pain but does endorse some right lower extremity phantom limb sensation.  Discussed continuing the current regimen as outlined above and he is in agreement.  He has no other acute complaints currently.    Opioid requirement previous 24 hours:   50 mcg IV fentanyl perioperatively  0.4 mg IV Dilaudid  30 mg oxycodone    Meds/Allergies   all current active meds have been reviewed, current meds:   Current Facility-Administered Medications:     acetaminophen (TYLENOL) tablet 975 mg, Q8H DEWAYNE    allopurinol (ZYLOPRIM) tablet 100 mg, Daily    aspirin chewable tablet 81 mg, Daily    atorvastatin (LIPITOR) tablet 80 mg, Daily With Dinner    bismuth subsalicylate (PEPTO BISMOL) oral suspension 524 mg, Q6H PRN    clopidogrel (PLAVIX) tablet 75 mg, Daily    Diclofenac Sodium (VOLTAREN) 1 % topical gel 2 g, 4x Daily    [Held by provider] Empagliflozin (JARDIANCE) tablet 10 mg, QAM    enoxaparin (LOVENOX) subcutaneous injection 40 mg, Daily    ezetimibe (ZETIA) tablet 10 mg, Daily    famotidine (PEPCID) tablet 20 mg, Daily    ferrous sulfate tablet 325 mg, Daily    finasteride (PROSCAR) tablet 5 mg, Daily    [Held by provider] furosemide (LASIX) tablet 40 mg, Daily    gabapentin (NEURONTIN) capsule 600 mg, BID    HYDROmorphone HCl (DILAUDID) injection 0.2 mg, Q4H PRN    loperamide (IMODIUM) capsule 2 mg, 4x Daily PRN    [Held by provider] losartan (COZAAR) tablet 12.5 mg, Daily    menthol-methyl salicylate (BENGAY) 10-15 % cream, 4x Daily PRN    methocarbamol (ROBAXIN) tablet 500 mg,  TID    metoprolol succinate (TOPROL-XL) 24 hr tablet 25 mg, Daily    naloxone (NARCAN) 0.04 mg/mL syringe 0.04 mg, Q1MIN PRN    ondansetron (ZOFRAN) injection 4 mg, Q6H PRN    oxyCODONE (ROXICODONE) IR tablet 5 mg, Q4H PRN **OR** oxyCODONE (ROXICODONE) immediate release tablet 10 mg, Q4H PRN    pentoxifylline (TRENtal) ER tablet 400 mg, TID With Meals    [COMPLETED] piperacillin-tazobactam (ZOSYN) 4.5 g in sodium chloride 0.9 % 100 mL IV LOADING DOSE, Once, Last Rate: Stopped (01/07/25 3579) **FOLLOWED BY** piperacillin-tazobactam (ZOSYN) 4.5 g in sodium chloride 0.9 % 100 mL IVPB (EXTENDED INFUSION), Q8H, Last Rate: 4.5 g (01/09/25 0624)    potassium chloride (Klor-Con M20) CR tablet 20 mEq, Daily    predniSONE tablet 5 mg, BID With Meals    tamsulosin (FLOMAX) capsule 0.4 mg, Daily With Dinner    venlafaxine (EFFEXOR) tablet 25 mg, Daily, and PTA meds:   Prior to Admission Medications   Prescriptions Last Dose Informant Patient Reported? Taking?   Accu-Chek FastClix Lancets MISC  Self Yes No   Alcohol Swabs (Alcohol Pads) 70 % PADS  Self Yes No   Sig: USE 2-3 TIMES AS DIRECTED.   Blood Glucose Monitoring Suppl (Accu-Chek Guide Me) w/Device KIT  Self No No   Sig: USE AS DIRECTED   Empagliflozin (Jardiance) 10 MG TABS tablet  Self No No   Sig: TAKE 1 TABLET (10 MG TOTAL) BY MOUTH EVERY MORNING   Lancet Devices (Lancing Device) MISC  Self No No   Sig: TID   acetaminophen (TYLENOL) 500 mg tablet  Self No No   Sig: Take 2 tablets (1,000 mg total) by mouth 3 (three) times a day as needed for mild pain   allopurinol (ZYLOPRIM) 100 mg tablet   No No   Sig: Take 1 tablet (100 mg total) by mouth daily   aspirin 81 mg chewable tablet  Self No No   Sig: Chew 1 tablet (81 mg total) daily   atorvastatin (LIPITOR) 80 mg tablet  Self No No   Sig: Take 1 tablet (80 mg total) by mouth daily with dinner   bismuth subsalicylate (PEPTO BISMOL) 262 MG chewable tablet  Self Yes No   Sig: Chew 524 mg As needed   calcium gluconate 1-0.675  GM/50ML-% IVPB   No No   Sig: Inject 1 g into a catheter in a vein over 0.5 hours at 100 mL/hr once for 1 dose   clopidogrel (PLAVIX) 75 mg tablet  Self No No   Sig: Take 1 tablet (75 mg total) by mouth daily   cyanocobalamin (VITAMIN B-12) 1000 MCG tablet  Self No No   Sig: Take 1 tablet (1,000 mcg total) by mouth daily   ezetimibe (ZETIA) 10 mg tablet  Self No No   Sig: Take 1 tablet (10 mg total) by mouth daily   famotidine (PEPCID) 20 mg tablet  Self No No   Sig: Take 1 tablet (20 mg total) by mouth daily   ferrous sulfate 325 (65 Fe) mg tablet  Self No No   Sig: Take 1 tablet (325 mg total) by mouth daily   finasteride (PROSCAR) 5 mg tablet   No No   Sig: Take 1 tablet (5 mg total) by mouth daily   furosemide (LASIX) 20 mg tablet  Self No No   Sig: TAKE 2 TABLETS (40 MG TOTAL) BY MOUTH DAILY   gabapentin (NEURONTIN) 300 mg capsule  Self No No   Sig: Take 2 capsules (600 mg total) by mouth 2 (two) times a day   glucose blood (Accu-Chek Guide) test strip  Self No No   Sig: TEST 2-3 TIMES AS DIRECTED   levofloxacin (LEVAQUIN) 500 mg tablet  Self No No   Sig: Take 1 tablet (500 mg total) by mouth every 24 hours for 14 days   loperamide (IMODIUM) 2 mg capsule  Self No No   Sig: Take 1 capsule (2 mg total) by mouth 4 (four) times a day as needed for diarrhea   losartan (COZAAR) 25 mg tablet  Self No No   Sig: TAKE 0.5 TABLETS (12.5 MG TOTAL) BY MOUTH DAILY   methocarbamol (ROBAXIN) 500 mg tablet   No No   Sig: TAKE 1 TABLET (500 MG TOTAL) BY MOUTH 3 (THREE) TIMES A DAY   metoprolol succinate (TOPROL-XL) 25 mg 24 hr tablet  Self No No   Sig: Take 1 tablet (25 mg total) by mouth daily   ondansetron (ZOFRAN) 4 mg tablet  Self No No   Sig: Take 1 tablet (4 mg total) by mouth every 8 (eight) hours as needed for nausea or vomiting   oxyCODONE (Roxicodone) 5 immediate release tablet  Self No No   Sig: Take 1 tablet (5 mg total) by mouth 2 (two) times a day as needed (wound pain) Max Daily Amount: 10 mg   pentoxifylline  (TRENtal) 400 mg ER tablet  Self No No   Sig: Take 1 tablet (400 mg total) by mouth 3 (three) times a day with meals   polyethylene glycol (GLYCOLAX) 17 GM/SCOOP powder  Self No No   Sig: Take 17 g by mouth every other day Monday Wednesday Friday   potassium chloride (Klor-Con M20) 20 mEq tablet  Self No No   Sig: TAKE 1 TABLET (20 MEQ TOTAL) BY MOUTH DAILY   predniSONE 5 mg tablet  Self No No   Sig: TAKE 1 TABLET (5 MG TOTAL) BY MOUTH 2 (TWO) TIMES A DAY WITH MEALS   tamsulosin (FLOMAX) 0.4 mg   No No   Sig: Take 1 capsule (0.4 mg total) by mouth daily with dinner      Facility-Administered Medications: None     No Known Allergies  Objective :  Temp:  [97.7 °F (36.5 °C)-98.4 °F (36.9 °C)] 98.3 °F (36.8 °C)  HR:  [] 93  BP: ()/() 101/70  Resp:  [15-26] 20  SpO2:  [87 %-98 %] 95 %  O2 Device: None (Room air)  Nasal Cannula O2 Flow Rate (L/min):  [2 L/min-6 L/min] 2 L/min    Physical Exam  Vitals reviewed.   HENT:      Head: Normocephalic and atraumatic.   Cardiovascular:      Rate and Rhythm: Normal rate.   Pulmonary:      Effort: Pulmonary effort is normal.   Abdominal:      General: There is no distension.      Tenderness: There is no abdominal tenderness.   Musculoskeletal:         General: Tenderness (RLE) present.      Cervical back: Normal range of motion.   Skin:     General: Skin is warm and dry.   Neurological:      Mental Status: He is alert and oriented to person, place, and time. Mental status is at baseline.   Psychiatric:         Mood and Affect: Mood normal.         Behavior: Behavior normal.            Lab Results: I have reviewed the following results:  Estimated Creatinine Clearance: 86.8 mL/min (by C-G formula based on SCr of 0.77 mg/dL).  Lab Results   Component Value Date    WBC 14.00 (H) 01/09/2025    HGB 8.1 (L) 01/09/2025    HCT 27.2 (L) 01/09/2025     (H) 01/09/2025         Component Value Date/Time    K 3.9 01/09/2025 0431     (H) 01/09/2025 0431    CO2 21  01/09/2025 0431    BUN 12 01/09/2025 0431    CREATININE 0.77 01/09/2025 0431         Component Value Date/Time    CALCIUM 7.4 (L) 01/09/2025 0431    ALKPHOS 94 01/09/2025 0431    AST 27 01/09/2025 0431    ALT 57 (H) 01/09/2025 0431    TP 6.1 (L) 01/09/2025 0431    ALB 3.1 (L) 01/09/2025 0431       Imaging Results Review: No pertinent imaging studies reviewed.  Other Study Results Review: No additional pertinent studies reviewed.

## 2025-01-10 ENCOUNTER — APPOINTMENT (INPATIENT)
Dept: RADIOLOGY | Facility: HOSPITAL | Age: 80
DRG: 239 | End: 2025-01-10
Payer: COMMERCIAL

## 2025-01-10 LAB
ANION GAP SERPL CALCULATED.3IONS-SCNC: 9 MMOL/L (ref 4–13)
BUN SERPL-MCNC: 11 MG/DL (ref 5–25)
CALCIUM SERPL-MCNC: 7.5 MG/DL (ref 8.4–10.2)
CHLORIDE SERPL-SCNC: 111 MMOL/L (ref 96–108)
CO2 SERPL-SCNC: 21 MMOL/L (ref 21–32)
CREAT SERPL-MCNC: 0.73 MG/DL (ref 0.6–1.3)
ERYTHROCYTE [DISTWIDTH] IN BLOOD BY AUTOMATED COUNT: 14.8 % (ref 11.6–15.1)
GFR SERPL CREATININE-BSD FRML MDRD: 88 ML/MIN/1.73SQ M
GLUCOSE SERPL-MCNC: 105 MG/DL (ref 65–140)
HCT VFR BLD AUTO: 24.7 % (ref 36.5–49.3)
HGB BLD-MCNC: 7.5 G/DL (ref 12–17)
MCH RBC QN AUTO: 29.1 PG (ref 26.8–34.3)
MCHC RBC AUTO-ENTMCNC: 30.4 G/DL (ref 31.4–37.4)
MCV RBC AUTO: 96 FL (ref 82–98)
PLATELET # BLD AUTO: 433 THOUSANDS/UL (ref 149–390)
PMV BLD AUTO: 9.4 FL (ref 8.9–12.7)
POTASSIUM SERPL-SCNC: 4 MMOL/L (ref 3.5–5.3)
RBC # BLD AUTO: 2.58 MILLION/UL (ref 3.88–5.62)
SODIUM SERPL-SCNC: 141 MMOL/L (ref 135–147)
WBC # BLD AUTO: 10.36 THOUSAND/UL (ref 4.31–10.16)

## 2025-01-10 PROCEDURE — NC001 PR NO CHARGE: Performed by: SURGERY

## 2025-01-10 PROCEDURE — 87505 NFCT AGENT DETECTION GI: CPT | Performed by: FAMILY MEDICINE

## 2025-01-10 PROCEDURE — 88300 SURGICAL PATH GROSS: CPT | Performed by: PATHOLOGY

## 2025-01-10 PROCEDURE — 80048 BASIC METABOLIC PNL TOTAL CA: CPT | Performed by: FAMILY MEDICINE

## 2025-01-10 PROCEDURE — 85027 COMPLETE CBC AUTOMATED: CPT | Performed by: FAMILY MEDICINE

## 2025-01-10 PROCEDURE — 99232 SBSQ HOSP IP/OBS MODERATE 35: CPT | Performed by: FAMILY MEDICINE

## 2025-01-10 PROCEDURE — 74176 CT ABD & PELVIS W/O CONTRAST: CPT

## 2025-01-10 PROCEDURE — 71045 X-RAY EXAM CHEST 1 VIEW: CPT

## 2025-01-10 RX ORDER — SENNOSIDES 8.6 MG
2 TABLET ORAL
Status: DISCONTINUED | OUTPATIENT
Start: 2025-01-10 | End: 2025-01-14

## 2025-01-10 RX ORDER — PANTOPRAZOLE SODIUM 40 MG/1
40 TABLET, DELAYED RELEASE ORAL
Status: DISCONTINUED | OUTPATIENT
Start: 2025-01-11 | End: 2025-01-24 | Stop reason: HOSPADM

## 2025-01-10 RX ORDER — PANTOPRAZOLE SODIUM 40 MG/1
40 TABLET, DELAYED RELEASE ORAL
Status: DISCONTINUED | OUTPATIENT
Start: 2025-01-11 | End: 2025-01-10

## 2025-01-10 RX ADMIN — CLOPIDOGREL BISULFATE 75 MG: 75 TABLET, FILM COATED ORAL at 08:09

## 2025-01-10 RX ADMIN — DICLOFENAC SODIUM 2 G: 10 GEL TOPICAL at 17:07

## 2025-01-10 RX ADMIN — METHOCARBAMOL 500 MG: 500 TABLET ORAL at 21:25

## 2025-01-10 RX ADMIN — ACETAMINOPHEN 975 MG: 325 TABLET, FILM COATED ORAL at 13:22

## 2025-01-10 RX ADMIN — POTASSIUM CHLORIDE 20 MEQ: 1500 TABLET, EXTENDED RELEASE ORAL at 08:09

## 2025-01-10 RX ADMIN — PENTOXIFYLLINE 400 MG: 400 TABLET, EXTENDED RELEASE ORAL at 13:22

## 2025-01-10 RX ADMIN — DICLOFENAC SODIUM 2 G: 10 GEL TOPICAL at 21:25

## 2025-01-10 RX ADMIN — FINASTERIDE 5 MG: 5 TABLET, FILM COATED ORAL at 08:09

## 2025-01-10 RX ADMIN — PENTOXIFYLLINE 400 MG: 400 TABLET, EXTENDED RELEASE ORAL at 17:07

## 2025-01-10 RX ADMIN — DICLOFENAC SODIUM 2 G: 10 GEL TOPICAL at 08:13

## 2025-01-10 RX ADMIN — LOPERAMIDE HYDROCHLORIDE 2 MG: 2 CAPSULE ORAL at 05:44

## 2025-01-10 RX ADMIN — ONDANSETRON 4 MG: 2 INJECTION INTRAMUSCULAR; INTRAVENOUS at 09:32

## 2025-01-10 RX ADMIN — VENLAFAXINE 25 MG: 25 TABLET ORAL at 08:12

## 2025-01-10 RX ADMIN — FERROUS SULFATE TAB 325 MG (65 MG ELEMENTAL FE) 325 MG: 325 (65 FE) TAB at 08:09

## 2025-01-10 RX ADMIN — GABAPENTIN 600 MG: 300 CAPSULE ORAL at 17:07

## 2025-01-10 RX ADMIN — EZETIMIBE 10 MG: 10 TABLET ORAL at 08:09

## 2025-01-10 RX ADMIN — ACETAMINOPHEN 975 MG: 325 TABLET, FILM COATED ORAL at 21:25

## 2025-01-10 RX ADMIN — ACETAMINOPHEN 975 MG: 325 TABLET, FILM COATED ORAL at 05:39

## 2025-01-10 RX ADMIN — METHOCARBAMOL 500 MG: 500 TABLET ORAL at 17:06

## 2025-01-10 RX ADMIN — LOPERAMIDE HYDROCHLORIDE 2 MG: 2 CAPSULE ORAL at 17:07

## 2025-01-10 RX ADMIN — PIPERACILLIN SODIUM AND TAZOBACTAM SODIUM 4.5 G: 36; 4.5 INJECTION, POWDER, LYOPHILIZED, FOR SOLUTION INTRAVENOUS at 05:40

## 2025-01-10 RX ADMIN — GABAPENTIN 600 MG: 300 CAPSULE ORAL at 08:09

## 2025-01-10 RX ADMIN — METHOCARBAMOL 500 MG: 500 TABLET ORAL at 08:09

## 2025-01-10 RX ADMIN — TAMSULOSIN HYDROCHLORIDE 0.4 MG: 0.4 CAPSULE ORAL at 17:07

## 2025-01-10 RX ADMIN — EMPAGLIFLOZIN 10 MG: 10 TABLET, FILM COATED ORAL at 08:09

## 2025-01-10 RX ADMIN — ASPIRIN 81 MG CHEWABLE TABLET 81 MG: 81 TABLET CHEWABLE at 08:09

## 2025-01-10 RX ADMIN — DICLOFENAC SODIUM 2 G: 10 GEL TOPICAL at 13:22

## 2025-01-10 RX ADMIN — ENOXAPARIN SODIUM 40 MG: 40 INJECTION SUBCUTANEOUS at 08:11

## 2025-01-10 RX ADMIN — PENTOXIFYLLINE 400 MG: 400 TABLET, EXTENDED RELEASE ORAL at 08:08

## 2025-01-10 RX ADMIN — LOPERAMIDE HYDROCHLORIDE 2 MG: 2 CAPSULE ORAL at 21:31

## 2025-01-10 RX ADMIN — METOPROLOL SUCCINATE 25 MG: 25 TABLET, EXTENDED RELEASE ORAL at 08:09

## 2025-01-10 RX ADMIN — ALLOPURINOL 100 MG: 100 TABLET ORAL at 08:09

## 2025-01-10 RX ADMIN — FAMOTIDINE 20 MG: 20 TABLET, FILM COATED ORAL at 08:09

## 2025-01-10 RX ADMIN — ATORVASTATIN CALCIUM 80 MG: 80 TABLET, FILM COATED ORAL at 17:07

## 2025-01-10 RX ADMIN — PREDNISONE 5 MG: 5 TABLET ORAL at 17:07

## 2025-01-10 RX ADMIN — PREDNISONE 5 MG: 5 TABLET ORAL at 08:09

## 2025-01-10 NOTE — WOUND OSTOMY CARE
"Progress Note - Wound   Royce L Free 79 y.o. male MRN: [id number]  Unit/Bed#: [facility] 523-01 Encounter: [id number]        Assessment:   Patient is a 80 yo male that was admitted to [facility] for treatment of PAD.  Patient is seen for wound care follow-up. On assessement, patient is side lying in bed, pillows in place for offloading with indwelling catheter in place for urine management.     Findings:  Podiatry is following and managing b/l feet/toe wounds - treatment to areas per their recommendations.     B/L sacro-buttocks is dry, intact, pink in color and blanches. No skin loss or wounds present. Recommend preventative hydragaurd to area.     Left lateral face, atypical wound- round shaped full thickness wound. Wound bed is hypergranular with beefy red tissue. Small amount of bloody drainage appreciated. Selene wound is dry and intact. Etiology unclear; patient notes wound has been there \"for five or ten years and just wont heal.\" Patient previously recommended to follow up with dermatology.       No induration, fluctuance, odor, warmth/temperature differences, redness, or purulence noted to the above noted wounds and skin areas assessed. New dressings applied per orders listed below. Patient tolerated well- no s/s of non-verbal pain or discomfort observed during the encounter. Bedside nurse aware of plan of care. See flow sheets for more detailed assessment findings.      Orders listed below and wound care will continue to follow, call or Secure Chat with questions.     Skin Care Plan:  1-Preventative Hydraguard to B/L Sacro-Buttocks BID and PRN  2-Turn/reposition q2h or when medically stable for pressure re-distribution on skin.  3-Elevate heels to offload pressure.  4-Moisturize skin daily with skin nourishing cream  5-Ehob cushion in chair when out of bed.  6-B/L Foot/Toe Wounds: per Podiatry recommendations.  7-Left Face Wound: Cleanse with NSS and pat dry. Apply a small 2x2 silicone bordered foam dressing to " wound. Zachery with T for treatment and change every other day or PRN        WOUNDS  Wound 10/24/24 Other (comment) Face Left (Active)   Wound Image   01/10/25 1215   Wound Description EARL 01/10/25 1355   Selene-wound Assessment EARL 01/10/25 1355   Wound Length (cm) 1.5 cm 01/10/25 1215   Wound Width (cm) 2 cm 01/10/25 1215   Wound Depth (cm) 0.2 cm 01/03/25 1157   Wound Surface Area (cm^2) 3 cm^2 01/10/25 1215   Wound Volume (cm^3) 0.45 cm^3 01/03/25 1157   Calculated Wound Volume (cm^3) 0.45 cm^3 01/03/25 1157   Change in Wound Size % -350 01/03/25 1157   Wound Site Closure EARL 01/06/25 1930   Drainage Amount Scant 01/10/25 1215   Drainage Description Bloody 01/10/25 1215   Non-staged Wound Description Full thickness 01/10/25 1215   Treatments Cleansed;Site care 01/10/25 1215   Dressing Foam, Silicon (eg. Allevyn, etc) 01/10/25 1355   Dressing Changed Changed 01/09/25 0800   Patient Tolerance Tolerated well 01/10/25 1215   Dressing Status Clean;Dry;Intact 01/10/25 1355       Odalis MURRYN RN CCRN  Wound and Ostomy Care

## 2025-01-10 NOTE — PLAN OF CARE
Problem: PAIN - ADULT  Goal: Verbalizes/displays adequate comfort level or baseline comfort level  Description: Interventions:  - Encourage patient to monitor pain and request assistance  - Assess pain using appropriate pain scale  - Administer analgesics based on type and severity of pain and evaluate response  - Implement non-pharmacological measures as appropriate and evaluate response  - Consider cultural and social influences on pain and pain management  - Notify physician/advanced practitioner if interventions unsuccessful or patient reports new pain  Outcome: Progressing     Problem: INFECTION - ADULT  Goal: Absence or prevention of progression during hospitalization  Description: INTERVENTIONS:  - Assess and monitor for signs and symptoms of infection  - Monitor lab/diagnostic results  - Monitor all insertion sites, i.e. indwelling lines, tubes, and drains  - Monitor endotracheal if appropriate and nasal secretions for changes in amount and color  - [place] appropriate cooling/warming therapies per order  - Administer medications as ordered  - Instruct and encourage patient and family to use good hand hygiene technique  - Identify and instruct in appropriate isolation precautions for identified infection/condition  Outcome: Progressing  Goal: Absence of fever/infection during neutropenic period  Description: INTERVENTIONS:  - Monitor WBC    Outcome: Progressing     Problem: SAFETY ADULT  Goal: Patient will remain free of falls  Description: INTERVENTIONS:  - Educate patient/family on patient safety including physical limitations  - Instruct patient to call for assistance with activity   - Consult OT/PT to assist with strengthening/mobility   - Keep Call bell within reach  - Keep bed low and locked with side rails adjusted as appropriate  - Keep care items and personal belongings within reach  - Initiate and maintain comfort rounds  - Make Fall Risk Sign visible to staff  - Offer Toileting every  Hours,  in advance of need  - Initiate/Maintain alarm  - Obtain necessary fall risk management equipment:   - Apply yellow socks and bracelet for high fall risk patients  - Consider moving patient to room near nurses station  Outcome: Progressing  Goal: Maintain or return to baseline ADL function  Description: INTERVENTIONS:  -  Assess patient's ability to carry out ADLs; assess patient's baseline for ADL function and identify physical deficits which impact ability to perform ADLs (bathing, care of mouth/teeth, toileting, grooming, dressing, etc.)  - Assess/evaluate cause of self-care deficits   - Assess range of motion  - Assess patient's mobility; develop plan if impaired  - Assess patient's need for assistive devices and provide as appropriate  - Encourage maximum independence but intervene and supervise when necessary  - Involve family in performance of ADLs  - Assess for home care needs following discharge   - Consider OT consult to assist with ADL evaluation and planning for discharge  - Provide patient education as appropriate  Outcome: Progressing  Goal: Maintains/Returns to pre admission functional level  Description: INTERVENTIONS:  - Perform AM-PAC 6 Click Basic Mobility/ Daily Activity assessment daily.  - Set and communicate daily mobility goal to care team and patient/family/caregiver.   - Collaborate with rehabilitation services on mobility goals if consulted  - Perform Range of Motion times a day.  - Reposition patient every  hours.  - Dangle patient  times a day  - Stand patient  times a day  - Ambulate patient  times a day  - Out of bed to chair  times a day   - Out of bed for meals times a day  - Out of bed for toileting  - Record patient progress and toleration of activity level   Outcome: Progressing     Problem: DISCHARGE PLANNING  Goal: Discharge to home or other facility with appropriate resources  Description: INTERVENTIONS:  - Identify barriers to discharge w/patient and caregiver  - Arrange for  needed discharge resources and transportation as appropriate  - Identify discharge learning needs (meds, wound care, etc.)  - Arrange for interpretive services to assist at discharge as needed  - Refer to Case Management Department for coordinating discharge planning if the patient needs post-hospital services based on physician/advanced practitioner order or complex needs related to functional status, cognitive ability, or social support system  Outcome: Progressing     Problem: Knowledge Deficit  Goal: Patient/family/caregiver demonstrates understanding of disease process, treatment plan, medications, and discharge instructions  Description: Complete learning assessment and assess knowledge base.  Interventions:  - Provide teaching at level of understanding  - Provide teaching via preferred learning methods  Outcome: Progressing     Problem: Prexisting or High Potential for Compromised Skin Integrity  Goal: Skin integrity is maintained or improved  Description: INTERVENTIONS:  - Identify patients at risk for skin breakdown  - Assess and monitor skin integrity  - Assess and monitor nutrition and hydration status  - Monitor labs   - Assess for incontinence   - Turn and reposition patient  - Assist with mobility/ambulation  - Relieve pressure over bony prominences  - Avoid friction and shearing  - Provide appropriate hygiene as needed including keeping skin clean and dry  - Evaluate need for skin moisturizer/barrier cream  - Collaborate with interdisciplinary team   - Patient/family teaching  - Consider wound care consult   Outcome: Progressing     Problem: Nutrition/Hydration-ADULT  Goal: Nutrient/Hydration intake appropriate for improving, restoring or maintaining nutritional needs  Description: Monitor and assess patient's nutrition/hydration status for malnutrition. Collaborate with interdisciplinary team and initiate plan and interventions as ordered.  Monitor patient's weight and dietary intake as ordered or  per policy. Utilize nutrition screening tool and intervene as necessary. Determine patient's food preferences and provide high-protein, high-caloric foods as appropriate.     INTERVENTIONS:  - Monitor oral intake, urinary output, labs, and treatment plans  - Assess nutrition and hydration status and recommend course of action  - Evaluate amount of meals eaten  - Assist patient with eating if necessary   - Allow adequate time for meals  - Recommend/ encourage appropriate diets, oral nutritional supplements, and vitamin/mineral supplements  - Order, calculate, and assess calorie counts as needed  - Recommend, monitor, and adjust tube feedings and TPN/PPN based on assessed needs  - Assess need for intravenous fluids  - Provide specific nutrition/hydration education as appropriate  - Include patient/family/caregiver in decisions related to nutrition  Outcome: Progressing

## 2025-01-10 NOTE — ASSESSMENT & PLAN NOTE
Right foot great toe gangrene with concern for underlying osteomyelitis  Severe peripheral vascular disease  Ulceration left foot midfoot lateral  History of extensive peripheral arterial disease  Podiatry and Vascular onboard  Continue aspirin, Plavix, atorvastatin, pentoxifylline  Transferred to [place] on 1/1/2025.  Discussed with podiatry and vascular.  Per vascular the CTA of the lower extremity shows very severe disease.  Not able to do limflow procedure.  Reached out to podiatry and they are also limited due to poor vasculature of the leg and are unable to do surgery.  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   Pain control

## 2025-01-10 NOTE — PROGRESS NOTES
Progress Note - Hospitalist   Name: Royce Rene 79 y.o. male I MRN: [id number]  [facility] I Date of Admission: 1/1/2025   Date of Service: 1/9/2025 I Hospital Day: 8    Assessment & Plan  PAD (peripheral artery disease) (HCC)    Right foot great toe gangrene with concern for underlying osteomyelitis  Severe peripheral vascular disease  Ulceration left foot midfoot lateral  History of extensive peripheral arterial disease  Podiatry and Vascular onboard  Continue aspirin, Plavix, atorvastatin, pentoxifylline  Transferred to [facility] on 1/1/2025.  Discussed with podiatry and vascular.  Per vascular the CTA of the lower extremity shows very severe disease.  Not able to do limflow procedure.  Reached out to podiatry and they are also limited due to poor vasculature of the leg and are unable to do surgery.  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   Pain control  Ambulatory dysfunction  Baseline- close to wheelchair bounded- minimal walk.  Fall precautions  Likely need rehab postprocedure.  PMR consult appreciated  Chronic combined systolic and diastolic CHF (congestive heart failure) (HCC)  Wt Readings from Last 3 Encounters:   01/09/25 94.7 kg (208 lb 12.4 oz)   12/26/24 100 kg (220 lb 14.4 oz)   12/13/24 103 kg (227 lb)   Home diuretic: PO Lasix 40mg QD \  Continue metoprolol, losartan, Jardiance  Low-salt diet  Monitor I/O, daily weights        Prostate cancer (HCC)  History of prostate cancer follows with oncology receiving chemotherapy (last on 12/12)  Follows with Dr. Hernandez  Current regimen is Cabazitaxel, Neulasta, prednisone, Lupron, denosumab  Continue to follow in outpatient setting  On prednisone 5  "mg twice daily-a monitor for hypotension perioperatively.  If there is a concern for hypotension will add stress dose of steroids  Diarrhea  Patient with episodes of diarrhea since receiving chemotherapy last week.  He also reports some nausea and vomiting.  Poor PO intake  Likely chemotherapy associated.  Improving  C. difficile and stool enteric panel negative  Supportive cares  Replete electrolytes  Imodium as needed  CAD (coronary artery disease)  Continue aspirin, metoprolol, atorvastatin  Urinary retention  Had to be straight cathed x 1  Started on Flomax and Finasteride with improvement in symptoms  Has been able to urinate on his own  Urinary retention protocol  Outpatient followup with Urology  Right ankle pain  Lab Results   Component Value Date    URICACID 11.5 (H) 12/26/2024       Patient reports right ankle pain  No obvious erythema swelling  X-ray reveals some sclerosis of calcaneus possible stress injury  CRP 79.1, uric acid 11.5.  Possible gout  Podiatry following, appreciate recommendations   Status post AKA.  Started on Zosyn per vascular surgery    Ulcer of left foot, limited to breakdown of skin (HCC)  Pic under media.  Low suspicion for active infection  Per podiatry, no surgical plans  Status post angiogram with the intervention previously      Hyponatremia  Recent Labs     01/07/25  0505 01/08/25  0644 01/09/25  0431   SODIUM 137 142 141     No results found for: \"OSMOUA\", \"NAUR\", \"OSMOLALITSER\"    Resolved    Iron deficiency anemia  Lab Results   Component Value Date    HGB 8.1 (L) 01/09/2025    HGB 8.4 (L) 01/08/2025    HGB 8.5 (L) 01/07/2025    HGB 8.5 (L) 01/04/2025    HGB 8.5 (L) 01/03/2025    CONCFE 10 (L) 12/28/2024    IRON 21 (L) 12/28/2024    FERRITIN 1,012 (H) 12/28/2024    TIBC 214.2 (L) 12/28/2024     Continue iron supplementation    Ischemic cardiomyopathy    Mixed hyperlipidemia    History of transcatheter aortic valve replacement (TAVR)    Encounter for preoperative assessment " for noncoronary cardiac surgery      VTE Pharmacologic Prophylaxis: VTE Score: 6 Moderate Risk (Score 3-4) - Pharmacological DVT Prophylaxis Ordered: enoxaparin (Lovenox).    Mobility:   Basic Mobility Inpatient Raw Score: 9  JH-HLM Goal: 3: Sit at edge of bed  JH-HLM Achieved: 5: Stand (1 or more minutes)  JH-HLM Goal achieved. Continue to encourage appropriate mobility.    Patient Centered Rounds: I performed bedside rounds with nursing staff today.   Discussions with Specialists or Other Care Team Provider:     Education and Discussions with Family / Patient: Updated  (son) via phone.    Current Length of Stay: 8 day(s)  Current Patient Status: Inpatient   Certification Statement: The patient will continue to require additional inpatient hospital stay due to pending rehab  Discharge Plan:     Code Status: Level 1 - Full Code    Subjective   Patient seen and examined.  No events overnight  Pain is controlled    Objective :  Temp:  [97.7 °F (36.5 °C)-98.3 °F (36.8 °C)] 98.1 °F (36.7 °C)  HR:  [] 88  BP: (100-111)/(61-70) 111/69  Resp:  [18-20] 18  SpO2:  [88 %-96 %] 92 %    Body mass index is 31.74 kg/m².     Input and Output Summary (last 24 hours):     Intake/Output Summary (Last 24 hours) at 1/9/2025 1942  Last data filed at 1/9/2025 1709  Gross per 24 hour   Intake 760 ml   Output 1025 ml   Net -265 ml       Physical Exam  Constitutional:       General: He is not in acute distress.     Appearance: He is not ill-appearing.   HENT:      Head: Normocephalic.      Nose: Nose normal.   Eyes:      General: No scleral icterus.  Cardiovascular:      Rate and Rhythm: Normal rate and regular rhythm.      Heart sounds: No murmur heard.  Pulmonary:      Effort: Pulmonary effort is normal. No respiratory distress.   Abdominal:      General: There is no distension.      Tenderness: There is no abdominal tenderness.   Musculoskeletal:      Comments: Right  aka  site covered in dressing  Left lower  extremity wound covered and dressing   Neurological:      Mental Status: He is alert.           Lines/Drains:  Lines/Drains/Airways       Active Status       Name Placement date Placement time Site Days    Urethral Catheter Straight-tip;Non-latex 16 Fr. 01/02/25  1611  Straight-tip;Non-latex  7                  Urinary Catheter:  Goal for removal: Voiding trial when ambulation improves         Central Line:  Goal for removal: Port accessed. Will de-access as appropriate.               Lab Results: I have reviewed the following results:   Results from last 7 days   Lab Units 01/09/25  0431   WBC Thousand/uL 14.00*   HEMOGLOBIN g/dL 8.1*   HEMATOCRIT % 27.2*   PLATELETS Thousands/uL 488*   SEGS PCT % 85*   LYMPHO PCT % 6*   MONO PCT % 6   EOS PCT % 1     Results from last 7 days   Lab Units 01/09/25  0431   SODIUM mmol/L 141   POTASSIUM mmol/L 3.9   CHLORIDE mmol/L 109*   CO2 mmol/L 21   BUN mg/dL 12   CREATININE mg/dL 0.77   ANION GAP mmol/L 11   CALCIUM mg/dL 7.4*   ALBUMIN g/dL 3.1*   TOTAL BILIRUBIN mg/dL 0.45   ALK PHOS U/L 94   ALT U/L 57*   AST U/L 27   GLUCOSE RANDOM mg/dL 88     Results from last 7 days   Lab Units 01/03/25  0453   INR  1.10     Results from last 7 days   Lab Units 01/03/25  1034   POC GLUCOSE mg/dl 113               Recent Cultures (last 7 days):         Imaging Results Review: No pertinent imaging studies reviewed.  Other Study Results Review: EKG was reviewed.     Last 24 Hours Medication List:     Current Facility-Administered Medications:     acetaminophen (TYLENOL) tablet 975 mg, Q8H DEWAYNE    allopurinol (ZYLOPRIM) tablet 100 mg, Daily    aspirin chewable tablet 81 mg, Daily    atorvastatin (LIPITOR) tablet 80 mg, Daily With Dinner    bismuth subsalicylate (PEPTO BISMOL) oral suspension 524 mg, Q6H PRN    clopidogrel (PLAVIX) tablet 75 mg, Daily    Diclofenac Sodium (VOLTAREN) 1 % topical gel 2 g, 4x Daily    [Held by provider] Empagliflozin (JARDIANCE) tablet 10 mg, QAM    enoxaparin  (LOVENOX) subcutaneous injection 40 mg, Daily    ezetimibe (ZETIA) tablet 10 mg, Daily    famotidine (PEPCID) tablet 20 mg, Daily    ferrous sulfate tablet 325 mg, Daily    finasteride (PROSCAR) tablet 5 mg, Daily    [Held by provider] furosemide (LASIX) tablet 40 mg, Daily    gabapentin (NEURONTIN) capsule 600 mg, BID    HYDROmorphone HCl (DILAUDID) injection 0.2 mg, Q4H PRN    loperamide (IMODIUM) capsule 2 mg, 4x Daily PRN    [Held by provider] losartan (COZAAR) tablet 12.5 mg, Daily    menthol-methyl salicylate (BENGAY) 10-15 % cream, 4x Daily PRN    methocarbamol (ROBAXIN) tablet 500 mg, TID    metoprolol succinate (TOPROL-XL) 24 hr tablet 25 mg, Daily    naloxone (NARCAN) 0.04 mg/mL syringe 0.04 mg, Q1MIN PRN    ondansetron (ZOFRAN) injection 4 mg, Q6H PRN    oxyCODONE (ROXICODONE) IR tablet 5 mg, Q4H PRN **OR** oxyCODONE (ROXICODONE) immediate release tablet 10 mg, Q4H PRN    pentoxifylline (TRENtal) ER tablet 400 mg, TID With Meals    [COMPLETED] piperacillin-tazobactam (ZOSYN) 4.5 g in sodium chloride 0.9 % 100 mL IV LOADING DOSE, Once, Last Rate: Stopped (01/07/25 1549) **FOLLOWED BY** piperacillin-tazobactam (ZOSYN) 4.5 g in sodium chloride 0.9 % 100 mL IVPB (EXTENDED INFUSION), Q8H, Last Rate: 4.5 g (01/09/25 1439)    potassium chloride (Klor-Con M20) CR tablet 20 mEq, Daily    predniSONE tablet 5 mg, BID With Meals    tamsulosin (FLOMAX) capsule 0.4 mg, Daily With Dinner    venlafaxine (EFFEXOR) tablet 25 mg, Daily    Administrative Statements   Today, Patient Was Seen By: Kelli Pennington MD      **Please Note: This note may have been constructed using a voice recognition system.**

## 2025-01-10 NOTE — ASSESSMENT & PLAN NOTE
Wt Readings from Last 3 Encounters:   01/09/25 94.7 kg (208 lb 12.4 oz)   12/26/24 100 kg (220 lb 14.4 oz)   12/13/24 103 kg (227 lb)   Home diuretic: PO Lasix 40mg QD \  Continue metoprolol, losartan, Jardiance  Low-salt diet  Monitor I/O, daily weights

## 2025-01-10 NOTE — ASSESSMENT & PLAN NOTE
Pic under media.  Low suspicion for active infection  Per podiatry, no surgical plans  Status post angiogram with the intervention previously

## 2025-01-10 NOTE — ANESTHESIA POSTPROCEDURE EVALUATION
Post-Op Assessment Note    CV Status:  Stable    Pain management: adequate       Mental Status:  Alert and awake   Hydration Status:  Euvolemic   PONV Controlled:  Controlled   Airway Patency:  Patent     Post Op Vitals Reviewed: Yes    No anethesia notable event occurred.    Staff: Anesthesiologist           Last Filed PACU Vitals:  Vitals Value Taken Time   Temp 98 °F (36.7 °C) 01/08/25 1145   Pulse 83 01/08/25 1159   /83 01/08/25 1156   Resp 15 01/08/25 1145   SpO2 97 % 01/08/25 1159   Vitals shown include unfiled device data.    Modified Delores:     Vitals Value Taken Time   Activity 2 01/08/25 1145   Respiration 2 01/08/25 1145   Circulation 2 01/08/25 1145   Consciousness 2 01/08/25 1145   Oxygen Saturation 1 01/08/25 1145     Modified Delores Score: 9

## 2025-01-10 NOTE — ASSESSMENT & PLAN NOTE
"Recent Labs     01/07/25  0505 01/08/25  0644 01/09/25  0431   SODIUM 137 142 141     No results found for: \"OSMOUA\", \"NAUR\", \"OSMOLALITSER\"    Resolved    "

## 2025-01-10 NOTE — ARC ADMISSION
Referral received for consideration of patient for Inpatient Acute Rehab.  After reviewing patients case at this time recommendation is for a slower paced therapy program given over a longer period of time such as in a Sub-Acute/TCU to maximize patients functional progress.

## 2025-01-10 NOTE — PROGRESS NOTES
Progress Note - Vascular Surgery   Name: Royce Rene 79 y.o. male I MRN: [id number]  Unit/Bed#: [facility] 523-01 I Date of Admission: 1/1/2025   Date of Service: 1/10/2025 I Hospital Day: 9    Assessment & Plan  PAD (peripheral artery disease) (HCC)  Patient is a 78yo male who presented with worsening right foot wound with ischemic changes and findings concerning for wet gangrene. 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 For his left foot wound, He is now s/p LLE agram, shockwave CFA, SFA, TPT, PT via R groin puncture on 1/7. He is overall doing well and is hemodynamically stable.     -Ok for discharge to rehab from vascular surgery perspective, Will complete stump check Monday, 1/13 if still inpatient  -Daily dressing changes for R AKA  -Trend WBC and fever curve  -Can discontinue antibiotics  -Continue local wound care for LLE  -pain and nausea control as needed  -Continue ASA, Plavix, statin  -APS consultation, appreciate recommendations  -PT/OT, level I  -Dispo planning  -rest of care per primary team    24 Hour Events : No acute events  Subjective : Pain is well controlled. Denies nausea, vomiting, fevers, chills, chest pain, shortness of breath. Tolerating a diet.     Objective :  Temp:  [97.9 °F (36.6 °C)-98.5 °F (36.9 °C)] 97.9 °F (36.6 °C)  HR:  [87-89] 89  BP: (111-128)/(69-77) 128/77  Resp:  [18] 18  SpO2:  [92 %-96 %] 96 %  O2 Device: None (Room air)     I/O         01/08 0701 01/09 0700 01/09 0701  01/10 0700 01/10 0701 01/11 0700    P.O.  900     I.V. (mL/kg) 200 (2.1)      IV Piggyback  100     Total Intake(mL/kg) 200 (2.1) 1000 (10.6)     Urine (mL/kg/hr) 500 (0.2) 1275 (0.6)     Stool  0     Total Output 500 1275     Net -300 -275            Unmeasured Stool Occurrence  3 x           Lines/Drains/Airways       Active Status       Name Placement date Placement time Site Days    Urethral Catheter Straight-tip;Non-latex 16 Fr. 01/02/25  1611  Straight-tip;Non-latex  7                  Physical Exam  Constitutional:        "Appearance: Normal appearance.   HENT:      Head: Normocephalic and atraumatic.      Right Ear: External ear normal.      Left Ear: External ear normal.      Nose: Nose normal.   Eyes:      Conjunctiva/sclera: Conjunctivae normal.   Cardiovascular:      Rate and Rhythm: Normal rate.   Pulmonary:      Effort: Pulmonary effort is normal. No respiratory distress.   Abdominal:      General: Abdomen is flat.   Musculoskeletal:      Comments: R AKA stump CDI. Image of stump below. Has appropriate tenderness, soft, no signs of hematoma. Some ecchymosis around incision site.    Skin:     General: Skin is warm and dry.   Neurological:      Mental Status: He is alert and oriented to person, place, and time.   Psychiatric:         Mood and Affect: Mood normal.         Behavior: Behavior normal.           Lab Results: I have reviewed the following results:  CBC with diff:   Lab Results   Component Value Date    WBC 10.36 (H) 01/10/2025    HGB 7.5 (L) 01/10/2025    HCT 24.7 (L) 01/10/2025    MCV 96 01/10/2025     (H) 01/10/2025    RBC 2.58 (L) 01/10/2025    MCH 29.1 01/10/2025    MCHC 30.4 (L) 01/10/2025    RDW 14.8 01/10/2025    MPV 9.4 01/10/2025    NRBC 0 01/09/2025   ,   BMP/CMP:  Lab Results   Component Value Date    SODIUM 141 01/10/2025    K 4.0 01/10/2025     (H) 01/10/2025    CO2 21 01/10/2025    BUN 11 01/10/2025    CREATININE 0.73 01/10/2025    CALCIUM 7.5 (L) 01/10/2025    AST 27 01/09/2025    ALT 57 (H) 01/09/2025    ALKPHOS 94 01/09/2025    EGFR 88 01/10/2025   ,   Lipid Panel: No results found for: \"CHOL\",   Coags:   Lab Results   Component Value Date    PTT 37 04/03/2023    INR 1.10 01/03/2025   ,   Blood Culture:   Lab Results   Component Value Date    BLOODCX No Growth After 5 Days. 04/03/2023   ,   Urinalysis:   Lab Results   Component Value Date    COLORU Yellow 01/18/2024    CLARITYU Clear 01/18/2024    SPECGRAV 1.020 01/18/2024    PHUR 5.5 01/18/2024    PHUR 5.5 02/14/2022    LEUKOCYTESUR " "Negative 01/18/2024    NITRITE Negative 01/18/2024    GLUCOSEU 3+ (A) 01/18/2024    KETONESU Negative 01/18/2024    BILIRUBINUR Negative 01/18/2024    BLOODU Negative 01/18/2024   ,   Urine Culture: No results found for: \"URINECX\",   Wound Culure:   Lab Results   Component Value Date    WOUNDCULT 1+ Growth of 12/12/2024       Imaging Results Review: No pertinent imaging studies reviewed.  Other Study Results Review: No additional pertinent studies reviewed.    VTE Prophylaxis: Enoxaparin (Lovenox)  "

## 2025-01-10 NOTE — ASSESSMENT & PLAN NOTE
Patient is a 78yo male who presented with worsening right foot wound with ischemic changes and findings concerning for wet gangrene. 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 For his left foot wound, He is now s/p LLE agram, shockwave CFA, SFA, TPT, PT via R groin puncture on 1/7. He is overall doing well and is hemodynamically stable.     -Ok for discharge to rehab from vascular surgery perspective, Will complete stump check Monday, 1/13 if still inpatient  -Daily dressing changes for R AKA  -Trend WBC and fever curve  -Can discontinue antibiotics  -Continue local wound care for LLE  -pain and nausea control as needed  -Continue ASA, Plavix, statin  -APS consultation, appreciate recommendations  -PT/OT, level I  -Dispo planning  -rest of care per primary team

## 2025-01-10 NOTE — ASSESSMENT & PLAN NOTE
Lab Results   Component Value Date    HGB 8.1 (L) 01/09/2025    HGB 8.4 (L) 01/08/2025    HGB 8.5 (L) 01/07/2025    HGB 8.5 (L) 01/04/2025    HGB 8.5 (L) 01/03/2025    CONCFE 10 (L) 12/28/2024    IRON 21 (L) 12/28/2024    FERRITIN 1,012 (H) 12/28/2024    TIBC 214.2 (L) 12/28/2024     Continue iron supplementation

## 2025-01-11 LAB
C COLI+JEJUNI TUF STL QL NAA+PROBE: NEGATIVE
EC STX1+STX2 GENES STL QL NAA+PROBE: NEGATIVE
SALMONELLA SP SPAO STL QL NAA+PROBE: NEGATIVE
SHIGELLA SP+EIEC IPAH STL QL NAA+PROBE: NEGATIVE

## 2025-01-11 PROCEDURE — 99232 SBSQ HOSP IP/OBS MODERATE 35: CPT | Performed by: FAMILY MEDICINE

## 2025-01-11 PROCEDURE — 87493 C DIFF AMPLIFIED PROBE: CPT | Performed by: FAMILY MEDICINE

## 2025-01-11 RX ORDER — SIMETHICONE 80 MG
80 TABLET,CHEWABLE ORAL EVERY 6 HOURS PRN
Status: DISCONTINUED | OUTPATIENT
Start: 2025-01-11 | End: 2025-01-24 | Stop reason: HOSPADM

## 2025-01-11 RX ADMIN — PREDNISONE 5 MG: 5 TABLET ORAL at 17:49

## 2025-01-11 RX ADMIN — PENTOXIFYLLINE 400 MG: 400 TABLET, EXTENDED RELEASE ORAL at 06:32

## 2025-01-11 RX ADMIN — ALLOPURINOL 100 MG: 100 TABLET ORAL at 08:38

## 2025-01-11 RX ADMIN — EZETIMIBE 10 MG: 10 TABLET ORAL at 08:38

## 2025-01-11 RX ADMIN — GABAPENTIN 600 MG: 300 CAPSULE ORAL at 17:49

## 2025-01-11 RX ADMIN — FINASTERIDE 5 MG: 5 TABLET, FILM COATED ORAL at 08:38

## 2025-01-11 RX ADMIN — ATORVASTATIN CALCIUM 80 MG: 80 TABLET, FILM COATED ORAL at 17:49

## 2025-01-11 RX ADMIN — BISMUTH SUBSALICYLATE 524 MG: 525 LIQUID ORAL at 06:33

## 2025-01-11 RX ADMIN — FERROUS SULFATE TAB 325 MG (65 MG ELEMENTAL FE) 325 MG: 325 (65 FE) TAB at 08:38

## 2025-01-11 RX ADMIN — PENTOXIFYLLINE 400 MG: 400 TABLET, EXTENDED RELEASE ORAL at 17:51

## 2025-01-11 RX ADMIN — CLOPIDOGREL BISULFATE 75 MG: 75 TABLET, FILM COATED ORAL at 08:38

## 2025-01-11 RX ADMIN — POTASSIUM CHLORIDE 20 MEQ: 1500 TABLET, EXTENDED RELEASE ORAL at 08:38

## 2025-01-11 RX ADMIN — PENTOXIFYLLINE 400 MG: 400 TABLET, EXTENDED RELEASE ORAL at 12:22

## 2025-01-11 RX ADMIN — GABAPENTIN 600 MG: 300 CAPSULE ORAL at 08:38

## 2025-01-11 RX ADMIN — PANTOPRAZOLE SODIUM 40 MG: 40 TABLET, DELAYED RELEASE ORAL at 06:16

## 2025-01-11 RX ADMIN — PANTOPRAZOLE SODIUM 40 MG: 40 TABLET, DELAYED RELEASE ORAL at 17:49

## 2025-01-11 RX ADMIN — ASPIRIN 81 MG CHEWABLE TABLET 81 MG: 81 TABLET CHEWABLE at 08:38

## 2025-01-11 RX ADMIN — ACETAMINOPHEN 975 MG: 325 TABLET, FILM COATED ORAL at 14:33

## 2025-01-11 RX ADMIN — METOPROLOL SUCCINATE 25 MG: 25 TABLET, EXTENDED RELEASE ORAL at 08:38

## 2025-01-11 RX ADMIN — TAMSULOSIN HYDROCHLORIDE 0.4 MG: 0.4 CAPSULE ORAL at 17:49

## 2025-01-11 RX ADMIN — METHOCARBAMOL 500 MG: 500 TABLET ORAL at 17:49

## 2025-01-11 RX ADMIN — METHOCARBAMOL 500 MG: 500 TABLET ORAL at 08:38

## 2025-01-11 RX ADMIN — LOPERAMIDE HYDROCHLORIDE 2 MG: 2 CAPSULE ORAL at 08:39

## 2025-01-11 RX ADMIN — ACETAMINOPHEN 975 MG: 325 TABLET, FILM COATED ORAL at 05:45

## 2025-01-11 RX ADMIN — EMPAGLIFLOZIN 10 MG: 10 TABLET, FILM COATED ORAL at 08:40

## 2025-01-11 RX ADMIN — PREDNISONE 5 MG: 5 TABLET ORAL at 06:16

## 2025-01-11 RX ADMIN — ENOXAPARIN SODIUM 40 MG: 40 INJECTION SUBCUTANEOUS at 08:38

## 2025-01-11 RX ADMIN — FAMOTIDINE 20 MG: 20 TABLET, FILM COATED ORAL at 08:38

## 2025-01-11 RX ADMIN — VENLAFAXINE 25 MG: 25 TABLET ORAL at 08:39

## 2025-01-11 RX ADMIN — ONDANSETRON 4 MG: 2 INJECTION INTRAMUSCULAR; INTRAVENOUS at 21:02

## 2025-01-11 NOTE — ASSESSMENT & PLAN NOTE
Right foot great toe gangrene with concern for underlying osteomyelitis  Severe peripheral vascular disease  Ulceration left foot midfoot lateral  History of extensive peripheral arterial disease  Podiatry and Vascular onboard  Continue aspirin, Plavix, atorvastatin, pentoxifylline  Transferred to [place] on 1/1/2025.  Discussed with podiatry and vascular.  Per vascular the CTA of the lower extremity shows very severe disease.  Not able to do limflow procedure.  Reached out to podiatry and they are also limited due to poor vasculature of the leg and are unable to do surgery.  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   Pain control  Vascular surgery cleared patient for discharge  Discontinue antibiotic

## 2025-01-11 NOTE — ASSESSMENT & PLAN NOTE
"Recent Labs     01/08/25  0644 01/09/25  0431 01/10/25  0403   SODIUM 142 141 141     No results found for: \"OSMOUA\", \"NAUR\", \"OSMOLALITSER\"    Resolved    "

## 2025-01-11 NOTE — ASSESSMENT & PLAN NOTE
Lab Results   Component Value Date    HGB 7.5 (L) 01/10/2025    HGB 8.1 (L) 01/09/2025    HGB 8.4 (L) 01/08/2025    HGB 8.5 (L) 01/07/2025    HGB 8.5 (L) 01/04/2025    CONCFE 10 (L) 12/28/2024    IRON 21 (L) 12/28/2024    FERRITIN 1,012 (H) 12/28/2024    TIBC 214.2 (L) 12/28/2024     Continue iron supplementation

## 2025-01-11 NOTE — ASSESSMENT & PLAN NOTE
Patient with episodes of diarrhea since receiving chemotherapy last week.  He also reports some nausea and vomiting.  Poor PO intake  Likely chemotherapy associated.  Improving  C. difficile and stool enteric panel negative  Supportive cares  Replete electrolytes  Imodium as needed  Patient still with persistent diarrhea and also having some nausea and vomiting.  Vomiting appears to be posttussive.  Chest x-ray reviewed.   CT abdomen and pelvis ordered

## 2025-01-11 NOTE — PROGRESS NOTES
Progress Note - Hospitalist   Name: Royce Rene 79 y.o. male I MRN: [id number]  [facility] I Date of Admission: 1/1/2025   Date of Service: 1/10/2025 I Hospital Day: 9     Assessment & Plan  PAD (peripheral artery disease) (HCC)    Right foot great toe gangrene with concern for underlying osteomyelitis  Severe peripheral vascular disease  Ulceration left foot midfoot lateral  History of extensive peripheral arterial disease  Podiatry and Vascular onboard  Continue aspirin, Plavix, atorvastatin, pentoxifylline  Transferred to [place] on 1/1/2025.  Discussed with podiatry and vascular.  Per vascular the CTA of the lower extremity shows very severe disease.  Not able to do limflow procedure.  Reached out to podiatry and they are also limited due to poor vasculature of the leg and are unable to do surgery.  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   Pain control  Vascular surgery cleared patient for discharge  Discontinue antibiotic  Ambulatory dysfunction  Baseline- close to wheelchair bounded- minimal walk.  Fall precautions  Likely need rehab postprocedure.  PMR consult appreciated  Chronic combined systolic and diastolic CHF (congestive heart failure) (HCC)  Wt Readings from Last 3 Encounters:   01/09/25 94.7 kg (208 lb 12.4 oz)   12/26/24 100 kg (220 lb 14.4 oz)   12/13/24 103 kg (227 lb)   Home diuretic: PO Lasix 40mg QD \  Continue metoprolol, losartan, Jardiance  Low-salt diet  Monitor I/O, daily weights        Prostate cancer (HCC)  History of prostate cancer follows with oncology receiving chemotherapy (last on 12/12)  Follows with Dr. Hernandez  Current regimen is Cabazitaxel, Neulasta, prednisone,  "Lupron, denosumab  Continue to follow in outpatient setting  On prednisone 5 mg twice daily-a monitor for hypotension perioperatively.  If there is a concern for hypotension will add stress dose of steroids  Diarrhea  Patient with episodes of diarrhea since receiving chemotherapy last week.  He also reports some nausea and vomiting.  Poor PO intake  Likely chemotherapy associated.  Improving  C. difficile and stool enteric panel negative  Supportive cares  Replete electrolytes  Imodium as needed  Patient still with persistent diarrhea and also having some nausea and vomiting.  Vomiting appears to be posttussive.  Chest x-ray reviewed.   CT abdomen and pelvis ordered  CAD (coronary artery disease)  Continue aspirin, metoprolol, atorvastatin  Urinary retention  Had to be straight cathed x 1  Started on Flomax and Finasteride with improvement in symptoms  Has been able to urinate on his own  Urinary retention protocol  Outpatient followup with Urology  Right ankle pain  Lab Results   Component Value Date    URICACID 11.5 (H) 12/26/2024       Patient reports right ankle pain  No obvious erythema swelling  X-ray reveals some sclerosis of calcaneus possible stress injury  CRP 79.1, uric acid 11.5.  Possible gout  Podiatry following, appreciate recommendations   Status post AKA.  Started on Zosyn per vascular surgery    Ulcer of left foot, limited to breakdown of skin (HCC)  Pic under media.  Low suspicion for active infection  Per podiatry, no surgical plans  Status post angiogram with the intervention previously      Hyponatremia  Recent Labs     01/08/25  0644 01/09/25  0431 01/10/25  0403   SODIUM 142 141 141     No results found for: \"OSMOUA\", \"NAUR\", \"OSMOLALITSER\"    Resolved    Iron deficiency anemia  Lab Results   Component Value Date    HGB 7.5 (L) 01/10/2025    HGB 8.1 (L) 01/09/2025    HGB 8.4 (L) 01/08/2025    HGB 8.5 (L) 01/07/2025    HGB 8.5 (L) 01/04/2025    CONCFE 10 (L) 12/28/2024    IRON 21 (L) " 12/28/2024    FERRITIN 1,012 (H) 12/28/2024    TIBC 214.2 (L) 12/28/2024     Continue iron supplementation    Ischemic cardiomyopathy    Mixed hyperlipidemia    History of transcatheter aortic valve replacement (TAVR)    Encounter for preoperative assessment for noncoronary cardiac surgery      VTE Pharmacologic Prophylaxis: VTE Score: 6 Moderate Risk (Score 3-4) - Pharmacological DVT Prophylaxis Ordered: enoxaparin (Lovenox).    Mobility:   Basic Mobility Inpatient Raw Score: 9  -[facility] Goal: 3: Sit at edge of bed  JH-HLM Achieved: 2: Bed activities/Dependent transfer  JH-HLM Goal achieved. Continue to encourage appropriate mobility.    Patient Centered Rounds: I performed bedside rounds with nursing staff today.   Discussions with Specialists or Other Care Team Provider:     Education and Discussions with Family / Patient: Attempted to update  (son) via phone. Left voicemail.     Current Length of Stay: 9 day(s)  Current Patient Status: Inpatient   Certification Statement: The patient will continue to require additional inpatient hospital stay due to pending rehab   Discharge Plan: Anticipate discharge in 24-48 hrs to rehab facility.    Code Status: Level 1 - Full Code    Subjective   Patient seen and examined.  Patient was having some cough with subsequent vomiting.  Patient also with diarrhea.  Previous C. difficile was negative.  Patient does not feel well today  Pain is controlled    Objective :  Temp:  [96.5 °F (35.8 °C)-98.5 °F (36.9 °C)] 97.4 °F (36.3 °C)  HR:  [80-89] 80  BP: (117-133)/(73-89) 133/89  Resp:  [16-18] 16  SpO2:  [94 %-96 %] 96 %    Body mass index is 31.74 kg/m².     Input and Output Summary (last 24 hours):     Intake/Output Summary (Last 24 hours) at 1/10/2025 2022  Last data filed at 1/10/2025 0728  Gross per 24 hour   Intake 450 ml   Output 500 ml   Net -50 ml       Physical Exam  Constitutional:       General: He is not in acute distress.     Appearance: He is not  ill-appearing.   HENT:      Head: Normocephalic and atraumatic.      Nose: Nose normal.   Eyes:      General: No scleral icterus.  Cardiovascular:      Rate and Rhythm: Normal rate and regular rhythm.      Heart sounds: No murmur heard.  Pulmonary:      Effort: Pulmonary effort is normal. No respiratory distress.      Breath sounds: No wheezing.   Abdominal:      General: Bowel sounds are normal. There is no distension.      Tenderness: There is no abdominal tenderness.   Musculoskeletal:      Comments: AKA site covered in dressing  Left toe eschar covered in dressing   Skin:     General: Skin is warm.      Coloration: Skin is not jaundiced.      Findings: No bruising.   Neurological:      Mental Status: He is alert. Mental status is at baseline.           Lines/Drains:  Lines/Drains/Airways       Active Status       Name Placement date Placement time Site Days    Urethral Catheter Straight-tip;Non-latex 16 Fr. 01/02/25  1611  Straight-tip;Non-latex  8                  Urinary Catheter:  Goal for removal: Voiding trial when ambulation improves         Central Line:  Goal for removal: Port accessed. Will de-access as appropriate.               Lab Results: I have reviewed the following results:   Results from last 7 days   Lab Units 01/10/25  0403 01/09/25  0431   WBC Thousand/uL 10.36* 14.00*   HEMOGLOBIN g/dL 7.5* 8.1*   HEMATOCRIT % 24.7* 27.2*   PLATELETS Thousands/uL 433* 488*   SEGS PCT %  --  85*   LYMPHO PCT %  --  6*   MONO PCT %  --  6   EOS PCT %  --  1     Results from last 7 days   Lab Units 01/10/25  0403 01/09/25  0431   SODIUM mmol/L 141 141   POTASSIUM mmol/L 4.0 3.9   CHLORIDE mmol/L 111* 109*   CO2 mmol/L 21 21   BUN mg/dL 11 12   CREATININE mg/dL 0.73 0.77   ANION GAP mmol/L 9 11   CALCIUM mg/dL 7.5* 7.4*   ALBUMIN g/dL  --  3.1*   TOTAL BILIRUBIN mg/dL  --  0.45   ALK PHOS U/L  --  94   ALT U/L  --  57*   AST U/L  --  27   GLUCOSE RANDOM mg/dL 105 88                       Recent Cultures (last 7  days):             Last 24 Hours Medication List:     Current Facility-Administered Medications:     acetaminophen (TYLENOL) tablet 975 mg, Q8H DEWAYNE    allopurinol (ZYLOPRIM) tablet 100 mg, Daily    aspirin chewable tablet 81 mg, Daily    atorvastatin (LIPITOR) tablet 80 mg, Daily With Dinner    bismuth subsalicylate (PEPTO BISMOL) oral suspension 524 mg, Q6H PRN    clopidogrel (PLAVIX) tablet 75 mg, Daily    Diclofenac Sodium (VOLTAREN) 1 % topical gel 2 g, 4x Daily    Empagliflozin (JARDIANCE) tablet 10 mg, QAM    enoxaparin (LOVENOX) subcutaneous injection 40 mg, Daily    ezetimibe (ZETIA) tablet 10 mg, Daily    famotidine (PEPCID) tablet 20 mg, Daily    ferrous sulfate tablet 325 mg, Daily    finasteride (PROSCAR) tablet 5 mg, Daily    [Held by provider] furosemide (LASIX) tablet 40 mg, Daily    gabapentin (NEURONTIN) capsule 600 mg, BID    HYDROmorphone HCl (DILAUDID) injection 0.2 mg, Q4H PRN    loperamide (IMODIUM) capsule 2 mg, 4x Daily PRN    [Held by provider] losartan (COZAAR) tablet 12.5 mg, Daily    menthol-methyl salicylate (BENGAY) 10-15 % cream, 4x Daily PRN    methocarbamol (ROBAXIN) tablet 500 mg, TID    metoprolol succinate (TOPROL-XL) 24 hr tablet 25 mg, Daily    naloxone (NARCAN) 0.04 mg/mL syringe 0.04 mg, Q1MIN PRN    ondansetron (ZOFRAN) injection 4 mg, Q6H PRN    oxyCODONE (ROXICODONE) IR tablet 5 mg, Q4H PRN **OR** oxyCODONE (ROXICODONE) immediate release tablet 10 mg, Q4H PRN    [START ON 1/11/2025] pantoprazole (PROTONIX) EC tablet 40 mg, Early Morning    pentoxifylline (TRENtal) ER tablet 400 mg, TID With Meals    potassium chloride (Klor-Con M20) CR tablet 20 mEq, Daily    predniSONE tablet 5 mg, BID With Meals    senna (SENOKOT) tablet 17.2 mg, HS    tamsulosin (FLOMAX) capsule 0.4 mg, Daily With Dinner    venlafaxine (EFFEXOR) tablet 25 mg, Daily    Administrative Statements   Today, Patient Was Seen By: Kelli Pennington MD      **Please Note: This note may have been constructed using  a voice recognition system.**

## 2025-01-12 LAB
ABO GROUP BLD: NORMAL
ALBUMIN SERPL BCG-MCNC: 2.9 G/DL (ref 3.5–5)
ALP SERPL-CCNC: 88 U/L (ref 34–104)
ALT SERPL W P-5'-P-CCNC: 27 U/L (ref 7–52)
ANION GAP SERPL CALCULATED.3IONS-SCNC: 8 MMOL/L (ref 4–13)
AST SERPL W P-5'-P-CCNC: 17 U/L (ref 13–39)
BILIRUB SERPL-MCNC: 0.4 MG/DL (ref 0.2–1)
BLD GP AB SCN SERPL QL: NEGATIVE
BUN SERPL-MCNC: 9 MG/DL (ref 5–25)
C DIFF TOX GENS STL QL NAA+PROBE: NEGATIVE
CALCIUM ALBUM COR SERPL-MCNC: 9 MG/DL (ref 8.3–10.1)
CALCIUM SERPL-MCNC: 8.1 MG/DL (ref 8.4–10.2)
CHLORIDE SERPL-SCNC: 110 MMOL/L (ref 96–108)
CO2 SERPL-SCNC: 22 MMOL/L (ref 21–32)
CREAT SERPL-MCNC: 0.68 MG/DL (ref 0.6–1.3)
ERYTHROCYTE [DISTWIDTH] IN BLOOD BY AUTOMATED COUNT: 14.9 % (ref 11.6–15.1)
GFR SERPL CREATININE-BSD FRML MDRD: 90 ML/MIN/1.73SQ M
GLUCOSE SERPL-MCNC: 87 MG/DL (ref 65–140)
HCT VFR BLD AUTO: 25 % (ref 36.5–49.3)
HGB BLD-MCNC: 7.3 G/DL (ref 12–17)
MCH RBC QN AUTO: 27.3 PG (ref 26.8–34.3)
MCHC RBC AUTO-ENTMCNC: 29.2 G/DL (ref 31.4–37.4)
MCV RBC AUTO: 94 FL (ref 82–98)
PLATELET # BLD AUTO: 438 THOUSANDS/UL (ref 149–390)
PMV BLD AUTO: 9.4 FL (ref 8.9–12.7)
POTASSIUM SERPL-SCNC: 3.6 MMOL/L (ref 3.5–5.3)
PROT SERPL-MCNC: 5.7 G/DL (ref 6.4–8.4)
RBC # BLD AUTO: 2.67 MILLION/UL (ref 3.88–5.62)
RH BLD: POSITIVE
SODIUM SERPL-SCNC: 140 MMOL/L (ref 135–147)
SPECIMEN EXPIRATION DATE: NORMAL
WBC # BLD AUTO: 9.62 THOUSAND/UL (ref 4.31–10.16)

## 2025-01-12 PROCEDURE — 99232 SBSQ HOSP IP/OBS MODERATE 35: CPT | Performed by: FAMILY MEDICINE

## 2025-01-12 PROCEDURE — 86923 COMPATIBILITY TEST ELECTRIC: CPT

## 2025-01-12 PROCEDURE — 85027 COMPLETE CBC AUTOMATED: CPT | Performed by: FAMILY MEDICINE

## 2025-01-12 PROCEDURE — 86900 BLOOD TYPING SEROLOGIC ABO: CPT | Performed by: FAMILY MEDICINE

## 2025-01-12 PROCEDURE — 86850 RBC ANTIBODY SCREEN: CPT | Performed by: FAMILY MEDICINE

## 2025-01-12 PROCEDURE — 86901 BLOOD TYPING SEROLOGIC RH(D): CPT | Performed by: FAMILY MEDICINE

## 2025-01-12 PROCEDURE — 30233N1 TRANSFUSION OF NONAUTOLOGOUS RED BLOOD CELLS INTO PERIPHERAL VEIN, PERCUTANEOUS APPROACH: ICD-10-PCS | Performed by: FAMILY MEDICINE

## 2025-01-12 PROCEDURE — 80053 COMPREHEN METABOLIC PANEL: CPT | Performed by: FAMILY MEDICINE

## 2025-01-12 PROCEDURE — P9016 RBC LEUKOCYTES REDUCED: HCPCS

## 2025-01-12 RX ORDER — FUROSEMIDE 10 MG/ML
20 INJECTION INTRAMUSCULAR; INTRAVENOUS ONCE
Status: COMPLETED | OUTPATIENT
Start: 2025-01-12 | End: 2025-01-12

## 2025-01-12 RX ORDER — GUAIFENESIN 600 MG/1
600 TABLET, EXTENDED RELEASE ORAL EVERY 12 HOURS SCHEDULED
Status: DISCONTINUED | OUTPATIENT
Start: 2025-01-12 | End: 2025-01-24 | Stop reason: HOSPADM

## 2025-01-12 RX ADMIN — PANTOPRAZOLE SODIUM 40 MG: 40 TABLET, DELAYED RELEASE ORAL at 04:45

## 2025-01-12 RX ADMIN — FERROUS SULFATE TAB 325 MG (65 MG ELEMENTAL FE) 325 MG: 325 (65 FE) TAB at 08:40

## 2025-01-12 RX ADMIN — METHOCARBAMOL 500 MG: 500 TABLET ORAL at 08:40

## 2025-01-12 RX ADMIN — VENLAFAXINE 25 MG: 25 TABLET ORAL at 08:41

## 2025-01-12 RX ADMIN — METOPROLOL SUCCINATE 25 MG: 25 TABLET, EXTENDED RELEASE ORAL at 08:40

## 2025-01-12 RX ADMIN — TAMSULOSIN HYDROCHLORIDE 0.4 MG: 0.4 CAPSULE ORAL at 17:29

## 2025-01-12 RX ADMIN — PREDNISONE 5 MG: 5 TABLET ORAL at 08:43

## 2025-01-12 RX ADMIN — GUAIFENESIN 600 MG: 600 TABLET, EXTENDED RELEASE ORAL at 21:27

## 2025-01-12 RX ADMIN — ONDANSETRON 4 MG: 2 INJECTION INTRAMUSCULAR; INTRAVENOUS at 14:28

## 2025-01-12 RX ADMIN — PANTOPRAZOLE SODIUM 40 MG: 40 TABLET, DELAYED RELEASE ORAL at 17:29

## 2025-01-12 RX ADMIN — CLOPIDOGREL BISULFATE 75 MG: 75 TABLET, FILM COATED ORAL at 08:40

## 2025-01-12 RX ADMIN — PENTOXIFYLLINE 400 MG: 400 TABLET, EXTENDED RELEASE ORAL at 13:16

## 2025-01-12 RX ADMIN — METHOCARBAMOL 500 MG: 500 TABLET ORAL at 17:29

## 2025-01-12 RX ADMIN — POTASSIUM CHLORIDE 20 MEQ: 1500 TABLET, EXTENDED RELEASE ORAL at 08:40

## 2025-01-12 RX ADMIN — ATORVASTATIN CALCIUM 80 MG: 80 TABLET, FILM COATED ORAL at 17:29

## 2025-01-12 RX ADMIN — ACETAMINOPHEN 975 MG: 325 TABLET, FILM COATED ORAL at 04:45

## 2025-01-12 RX ADMIN — LOPERAMIDE HYDROCHLORIDE 2 MG: 2 CAPSULE ORAL at 13:16

## 2025-01-12 RX ADMIN — GABAPENTIN 600 MG: 300 CAPSULE ORAL at 17:29

## 2025-01-12 RX ADMIN — ALLOPURINOL 100 MG: 100 TABLET ORAL at 08:40

## 2025-01-12 RX ADMIN — ENOXAPARIN SODIUM 40 MG: 40 INJECTION SUBCUTANEOUS at 08:40

## 2025-01-12 RX ADMIN — PENTOXIFYLLINE 400 MG: 400 TABLET, EXTENDED RELEASE ORAL at 08:41

## 2025-01-12 RX ADMIN — FINASTERIDE 5 MG: 5 TABLET, FILM COATED ORAL at 08:40

## 2025-01-12 RX ADMIN — BISMUTH SUBSALICYLATE 524 MG: 525 LIQUID ORAL at 14:28

## 2025-01-12 RX ADMIN — GABAPENTIN 600 MG: 300 CAPSULE ORAL at 08:40

## 2025-01-12 RX ADMIN — PENTOXIFYLLINE 400 MG: 400 TABLET, EXTENDED RELEASE ORAL at 17:30

## 2025-01-12 RX ADMIN — EZETIMIBE 10 MG: 10 TABLET ORAL at 08:40

## 2025-01-12 RX ADMIN — ACETAMINOPHEN 975 MG: 325 TABLET, FILM COATED ORAL at 13:16

## 2025-01-12 RX ADMIN — ACETAMINOPHEN 975 MG: 325 TABLET, FILM COATED ORAL at 21:27

## 2025-01-12 RX ADMIN — ASPIRIN 81 MG CHEWABLE TABLET 81 MG: 81 TABLET CHEWABLE at 08:40

## 2025-01-12 RX ADMIN — PREDNISONE 5 MG: 5 TABLET ORAL at 17:29

## 2025-01-12 RX ADMIN — FAMOTIDINE 20 MG: 20 TABLET, FILM COATED ORAL at 08:40

## 2025-01-12 RX ADMIN — FUROSEMIDE 20 MG: 10 INJECTION, SOLUTION INTRAMUSCULAR; INTRAVENOUS at 21:27

## 2025-01-12 RX ADMIN — EMPAGLIFLOZIN 10 MG: 10 TABLET, FILM COATED ORAL at 08:42

## 2025-01-12 RX ADMIN — METHOCARBAMOL 500 MG: 500 TABLET ORAL at 21:27

## 2025-01-12 NOTE — ASSESSMENT & PLAN NOTE
"Recent Labs     01/10/25  0403 01/12/25  0431   SODIUM 141 140     No results found for: \"OSMOUA\", \"NAUR\", \"OSMOLALITSER\"    Resolved    "

## 2025-01-12 NOTE — ASSESSMENT & PLAN NOTE
Patient with episodes of diarrhea since receiving chemotherapy last week.  He also reports some nausea and vomiting.  Poor PO intake  Likely chemotherapy associated.  Improving  C. difficile and stool enteric panel negative  Supportive cares  Replete electrolytes  Imodium as needed  Patient still with persistent diarrhea and also having some nausea and vomiting.  Vomiting appears to be posttussive.  Chest x-ray reviewed.   CT abdomen and pelvis ordered-CT scan reviewed.-Nonspecific findings underlying gastritis or colitis   Stool enteric panel is negative.  C. difficile negative.-Continue with symptomatic care.  If the abdominal discomfort is persistent will obtain GI evaluation  Vomiting appears to be posttussive in nature

## 2025-01-12 NOTE — ASSESSMENT & PLAN NOTE
History of prostate cancer follows with oncology receiving chemotherapy (last on 12/12)  Follows with Dr. Hernandez  Current regimen is Cabazitaxel, Neulasta, prednisone, Lupron, denosumab  Continue to follow in outpatient setting  On prednisone 5 mg twice daily-no postoperative-potential and requiring stress dose of steroids   alcohol: no recent use, no HX of w/d seizures, DTs or blackouts, last use 3 days ago "just 2 beers"

## 2025-01-12 NOTE — ASSESSMENT & PLAN NOTE
Right foot great toe gangrene with concern for underlying osteomyelitis  Severe peripheral vascular disease  Ulceration left foot midfoot lateral  History of extensive peripheral arterial disease  Podiatry and Vascular onboard  Continue aspirin, Plavix, atorvastatin, pentoxifylline  Transferred to [place] on 1/1/2025.  Discussed with podiatry and vascular.  Per vascular the CTA of the lower extremity shows very severe disease.  Not able to do limflow procedure.  Reached out to podiatry and they are also limited due to poor vasculature of the leg and are unable to do surgery.  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   Pain control  Vascular surgery cleared patient for discharge  Antibiotics discontinued

## 2025-01-12 NOTE — PROGRESS NOTES
Progress Note - Hospitalist   Name: Royce Rene 79 y.o. male I MRN: [id number]  [facility] Date of Admission: 1/1/2025   Date of Service: 1/11/2025 I Hospital Day: 10    Assessment & Plan  PAD (peripheral artery disease) (HCC)    Right foot great toe gangrene with concern for underlying osteomyelitis  Severe peripheral vascular disease  Ulceration left foot midfoot lateral  History of extensive peripheral arterial disease  Podiatry and Vascular onboard  Continue aspirin, Plavix, atorvastatin, pentoxifylline  Transferred to [place] on 1/1/2025.  Discussed with podiatry and vascular.  Per vascular the CTA of the lower extremity shows very severe disease.  Not able to do limflow procedure.  Reached out to podiatry and they are also limited due to poor vasculature of the leg and are unable to do surgery.  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   Pain control  Vascular surgery cleared patient for discharge  Antibiotics discontinued  Ambulatory dysfunction  Baseline- close to wheelchair bounded- minimal walk.  Fall precautions  Likely need rehab postprocedure.  PMR consult appreciated  Chronic combined systolic and diastolic CHF (congestive heart failure) (HCC)  Wt Readings from Last 3 Encounters:   01/09/25 94.7 kg (208 lb 12.4 oz)   12/26/24 100 kg (220 lb 14.4 oz)   12/13/24 103 kg (227 lb)   Home diuretic: PO Lasix 40mg QD \  Continue metoprolol, losartan, Jardiance  Low-salt diet  Monitor I/O, daily weights        Prostate cancer (HCC)  History of prostate cancer follows with oncology receiving chemotherapy (last on 12/12)  Follows with Dr. Hernandez  Current regimen is Cabazitaxel, Neulasta, prednisone,  "Lupron, denosumab  Continue to follow in outpatient setting  On prednisone 5 mg twice daily-a monitor for hypotension perioperatively.  If there is a concern for hypotension will add stress dose of steroids  Diarrhea  Patient with episodes of diarrhea since receiving chemotherapy last week.  He also reports some nausea and vomiting.  Poor PO intake  Likely chemotherapy associated.  Improving  C. difficile and stool enteric panel negative  Supportive cares  Replete electrolytes  Imodium as needed  Patient still with persistent diarrhea and also having some nausea and vomiting.  Vomiting appears to be posttussive.  Chest x-ray reviewed.   CT abdomen and pelvis ordered-CT scan reviewed.  Questionable colitis.  Stool enteric panel is negative.  Order C. difficile due to persistent diarrhea.  Patient feels tired.  Vomited x 1 today.  Continue with symptomatic management  CAD (coronary artery disease)  Continue aspirin, metoprolol, atorvastatin  Urinary retention  Had to be straight cathed x 1  Started on Flomax and Finasteride with improvement in symptoms  Has been able to urinate on his own  Patient has Moser catheter in.  Likely voiding trial in the rehab  Ulcer of left foot, limited to breakdown of skin (HCC)  Pic under media.  Low suspicion for active infection  Per podiatry, no surgical plans  Status post angiogram with the intervention previously      Hyponatremia  Recent Labs     01/09/25  0431 01/10/25  0403   SODIUM 141 141     No results found for: \"OSMOUA\", \"NAUR\", \"OSMOLALITSER\"    Resolved    Iron deficiency anemia  Lab Results   Component Value Date    HGB 7.5 (L) 01/10/2025    HGB 8.1 (L) 01/09/2025    HGB 8.4 (L) 01/08/2025    HGB 8.5 (L) 01/07/2025    HGB 8.5 (L) 01/04/2025    CONCFE 10 (L) 12/28/2024    IRON 21 (L) 12/28/2024    FERRITIN 1,012 (H) 12/28/2024    TIBC 214.2 (L) 12/28/2024     Continue iron supplementation    Ischemic cardiomyopathy    Mixed hyperlipidemia    History of transcatheter " aortic valve replacement (TAVR)    Encounter for preoperative assessment for noncoronary cardiac surgery      VTE Pharmacologic Prophylaxis: VTE Score: 6 Moderate Risk (Score 3-4) - Pharmacological DVT Prophylaxis Ordered: enoxaparin (Lovenox).    Mobility:   Basic Mobility Inpatient Raw Score: 12  -HL Goal: 4: Move to chair/commode  JH-HLM Achieved: 2: Bed activities/Dependent transfer  JH-HLM Goal achieved. Continue to encourage appropriate mobility.    Patient Centered Rounds: I performed bedside rounds with nursing staff today.   Discussions with Specialists or Other Care Team Provider:     Education and Discussions with Family / Patient: Attempted to update  (son) via phone. Left voicemail.     Current Length of Stay: 10 day(s)  Current Patient Status: Inpatient   Certification Statement: The patient will continue to require additional inpatient hospital stay due to s/p AKA  Discharge Plan:    Code Status: Level 1 - Full Code    Subjective   Patient seen and examined.  Patient reported that he feels tired.  Decreased p.o. intake due to nausea vomited x 1 today.  Still with diarrhea.  Patient with longstanding history of diarrhea and takes Imodium consistently    Objective :  Temp:  [97.8 °F (36.6 °C)-99.7 °F (37.6 °C)] 97.8 °F (36.6 °C)  HR:  [80-89] 89  BP: (104-116)/(38-58) 104/38  Resp:  [17-18] 17  SpO2:  [95 %-97 %] 97 %  O2 Device: None (Room air)    Body mass index is 31.74 kg/m².     Input and Output Summary (last 24 hours):     Intake/Output Summary (Last 24 hours) at 1/11/2025 1930  Last data filed at 1/11/2025 1700  Gross per 24 hour   Intake 480 ml   Output 700 ml   Net -220 ml       Physical Exam  Constitutional:       General: He is not in acute distress.     Appearance: He is not ill-appearing.   HENT:      Head: Normocephalic and atraumatic.      Nose: Nose normal.   Eyes:      General: No scleral icterus.  Cardiovascular:      Rate and Rhythm: Normal rate and regular rhythm.       Heart sounds: No murmur heard.  Pulmonary:      Effort: Pulmonary effort is normal. No respiratory distress.   Abdominal:      General: There is no distension.      Tenderness: There is no abdominal tenderness. There is no guarding.   Musculoskeletal:         General: Normal range of motion.      Comments: Right AKA site covered in dressing.  Left toe eschar covered in dressing   Skin:     General: Skin is warm.   Neurological:      Mental Status: He is alert. Mental status is at baseline.           Lines/Drains:  Lines/Drains/Airways       Active Status       Name Placement date Placement time Site Days    Urethral Catheter Straight-tip;Non-latex 16 Fr. 01/02/25  1611  Straight-tip;Non-latex  9                  Urinary Catheter:  Goal for removal: Voiding trial when ambulation improves         Central Line:  Goal for removal: Port accessed. Will de-access as appropriate.               Lab Results: I have reviewed the following results:   Results from last 7 days   Lab Units 01/10/25  0403 01/09/25  0431   WBC Thousand/uL 10.36* 14.00*   HEMOGLOBIN g/dL 7.5* 8.1*   HEMATOCRIT % 24.7* 27.2*   PLATELETS Thousands/uL 433* 488*   SEGS PCT %  --  85*   LYMPHO PCT %  --  6*   MONO PCT %  --  6   EOS PCT %  --  1     Results from last 7 days   Lab Units 01/10/25  0403 01/09/25  0431   SODIUM mmol/L 141 141   POTASSIUM mmol/L 4.0 3.9   CHLORIDE mmol/L 111* 109*   CO2 mmol/L 21 21   BUN mg/dL 11 12   CREATININE mg/dL 0.73 0.77   ANION GAP mmol/L 9 11   CALCIUM mg/dL 7.5* 7.4*   ALBUMIN g/dL  --  3.1*   TOTAL BILIRUBIN mg/dL  --  0.45   ALK PHOS U/L  --  94   ALT U/L  --  57*   AST U/L  --  27   GLUCOSE RANDOM mg/dL 105 88                       Recent Cultures (last 7 days):               Last 24 Hours Medication List:     Current Facility-Administered Medications:     acetaminophen (TYLENOL) tablet 975 mg, Q8H DEWAYNE    allopurinol (ZYLOPRIM) tablet 100 mg, Daily    aspirin chewable tablet 81 mg, Daily    atorvastatin  (LIPITOR) tablet 80 mg, Daily With Dinner    bismuth subsalicylate (PEPTO BISMOL) oral suspension 524 mg, Q6H PRN    clopidogrel (PLAVIX) tablet 75 mg, Daily    Diclofenac Sodium (VOLTAREN) 1 % topical gel 2 g, 4x Daily    Empagliflozin (JARDIANCE) tablet 10 mg, QAM    enoxaparin (LOVENOX) subcutaneous injection 40 mg, Daily    ezetimibe (ZETIA) tablet 10 mg, Daily    famotidine (PEPCID) tablet 20 mg, Daily    ferrous sulfate tablet 325 mg, Daily    finasteride (PROSCAR) tablet 5 mg, Daily    [Held by provider] furosemide (LASIX) tablet 40 mg, Daily    gabapentin (NEURONTIN) capsule 600 mg, BID    HYDROmorphone HCl (DILAUDID) injection 0.2 mg, Q4H PRN    loperamide (IMODIUM) capsule 2 mg, 4x Daily PRN    [Held by provider] losartan (COZAAR) tablet 12.5 mg, Daily    menthol-methyl salicylate (BENGAY) 10-15 % cream, 4x Daily PRN    methocarbamol (ROBAXIN) tablet 500 mg, TID    metoprolol succinate (TOPROL-XL) 24 hr tablet 25 mg, Daily    naloxone (NARCAN) 0.04 mg/mL syringe 0.04 mg, Q1MIN PRN    ondansetron (ZOFRAN) injection 4 mg, Q6H PRN    oxyCODONE (ROXICODONE) IR tablet 5 mg, Q4H PRN **OR** oxyCODONE (ROXICODONE) immediate release tablet 10 mg, Q4H PRN    pantoprazole (PROTONIX) EC tablet 40 mg, BID AC    pentoxifylline (TRENtal) ER tablet 400 mg, TID With Meals    potassium chloride (Klor-Con M20) CR tablet 20 mEq, Daily    predniSONE tablet 5 mg, BID With Meals    senna (SENOKOT) tablet 17.2 mg, HS    tamsulosin (FLOMAX) capsule 0.4 mg, Daily With Dinner    venlafaxine (EFFEXOR) tablet 25 mg, Daily    Administrative Statements   Today, Patient Was Seen By: Kelli Pennington MD      **Please Note: This note may have been constructed using a voice recognition system.**

## 2025-01-12 NOTE — ASSESSMENT & PLAN NOTE
Had to be straight cathed x 1  Started on Flomax and Finasteride with improvement in symptoms  Has been able to urinate on his own  Patient has Moser catheter in.  Likely voiding trial in the rehab

## 2025-01-12 NOTE — ASSESSMENT & PLAN NOTE
Lab Results   Component Value Date    HGB 7.3 (L) 01/12/2025    HGB 7.5 (L) 01/10/2025    HGB 8.1 (L) 01/09/2025    HGB 8.4 (L) 01/08/2025    HGB 8.5 (L) 01/07/2025    CONCFE 10 (L) 12/28/2024    IRON 21 (L) 12/28/2024    FERRITIN 1,012 (H) 12/28/2024    TIBC 214.2 (L) 12/28/2024     Continue iron supplementation  Will give 1 unit of packed RBCs today

## 2025-01-12 NOTE — ASSESSMENT & PLAN NOTE
Pic under media.  Low suspicion for active infection  Per podiatry, no surgical plans  Status post angiogram with the intervention previously  Continue with local wound care

## 2025-01-12 NOTE — ASSESSMENT & PLAN NOTE
Wt Readings from Last 3 Encounters:   01/09/25 94.7 kg (208 lb 12.4 oz)   12/26/24 100 kg (220 lb 14.4 oz)   12/13/24 103 kg (227 lb)   Home diuretic: PO Lasix 40mg QD -Lasix on hold due to poor p.o. intake  Continue metoprolol, losartan, Jardiance  Low-salt diet  Monitor I/O, daily weights

## 2025-01-12 NOTE — ASSESSMENT & PLAN NOTE
Patient with episodes of diarrhea since receiving chemotherapy last week.  He also reports some nausea and vomiting.  Poor PO intake  Likely chemotherapy associated.  Improving  C. difficile and stool enteric panel negative  Supportive cares  Replete electrolytes  Imodium as needed  Patient still with persistent diarrhea and also having some nausea and vomiting.  Vomiting appears to be posttussive.  Chest x-ray reviewed.   CT abdomen and pelvis ordered-CT scan reviewed.  Questionable colitis.  Stool enteric panel is negative.  Order C. difficile due to persistent diarrhea.  Patient feels tired.  Vomited x 1 today.  Continue with symptomatic management

## 2025-01-12 NOTE — ASSESSMENT & PLAN NOTE
"Recent Labs     01/09/25  0431 01/10/25  0403   SODIUM 141 141     No results found for: \"OSMOUA\", \"NAUR\", \"OSMOLALITSER\"    Resolved    "

## 2025-01-12 NOTE — ASSESSMENT & PLAN NOTE
Right foot great toe gangrene with concern for underlying osteomyelitis  Severe peripheral vascular disease  Ulceration left foot midfoot lateral  History of extensive peripheral arterial disease  Podiatry and Vascular onboard  Continue aspirin, Plavix, atorvastatin, pentoxifylline  Transferred to [facility] on 1/1/2025.  Discussed with podiatry and vascular.  Per vascular the CTA of the lower extremity shows very severe disease.    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   Pain control  Vascular surgery cleared patient for discharge  Antibiotics discontinued

## 2025-01-13 LAB
ABO GROUP BLD BPU: NORMAL
ANION GAP SERPL CALCULATED.3IONS-SCNC: 11 MMOL/L (ref 4–13)
BPU ID: NORMAL
BUN SERPL-MCNC: 13 MG/DL (ref 5–25)
CALCIUM SERPL-MCNC: 7.9 MG/DL (ref 8.4–10.2)
CHLORIDE SERPL-SCNC: 106 MMOL/L (ref 96–108)
CO2 SERPL-SCNC: 22 MMOL/L (ref 21–32)
CREAT SERPL-MCNC: 0.68 MG/DL (ref 0.6–1.3)
CROSSMATCH: NORMAL
ERYTHROCYTE [DISTWIDTH] IN BLOOD BY AUTOMATED COUNT: 15.9 % (ref 11.6–15.1)
GFR SERPL CREATININE-BSD FRML MDRD: 90 ML/MIN/1.73SQ M
GLUCOSE SERPL-MCNC: 87 MG/DL (ref 65–140)
HCT VFR BLD AUTO: 31.1 % (ref 36.5–49.3)
HGB BLD-MCNC: 9.7 G/DL (ref 12–17)
MCH RBC QN AUTO: 28.5 PG (ref 26.8–34.3)
MCHC RBC AUTO-ENTMCNC: 31.2 G/DL (ref 31.4–37.4)
MCV RBC AUTO: 92 FL (ref 82–98)
PLATELET # BLD AUTO: 448 THOUSANDS/UL (ref 149–390)
PMV BLD AUTO: 9.2 FL (ref 8.9–12.7)
POTASSIUM SERPL-SCNC: 3.5 MMOL/L (ref 3.5–5.3)
RBC # BLD AUTO: 3.4 MILLION/UL (ref 3.88–5.62)
SODIUM SERPL-SCNC: 139 MMOL/L (ref 135–147)
UNIT DISPENSE STATUS: NORMAL
UNIT PRODUCT CODE: NORMAL
UNIT PRODUCT VOLUME: 350 ML
UNIT RH: NORMAL
WBC # BLD AUTO: 9.13 THOUSAND/UL (ref 4.31–10.16)

## 2025-01-13 PROCEDURE — 99232 SBSQ HOSP IP/OBS MODERATE 35: CPT | Performed by: FAMILY MEDICINE

## 2025-01-13 PROCEDURE — 99233 SBSQ HOSP IP/OBS HIGH 50: CPT | Performed by: PODIATRIST

## 2025-01-13 PROCEDURE — 80048 BASIC METABOLIC PNL TOTAL CA: CPT | Performed by: FAMILY MEDICINE

## 2025-01-13 PROCEDURE — 85027 COMPLETE CBC AUTOMATED: CPT | Performed by: FAMILY MEDICINE

## 2025-01-13 RX ORDER — SUCRALFATE 1 G/1
1 TABLET ORAL
Status: DISCONTINUED | OUTPATIENT
Start: 2025-01-13 | End: 2025-01-24 | Stop reason: HOSPADM

## 2025-01-13 RX ADMIN — SIMETHICONE 80 MG: 80 TABLET, CHEWABLE ORAL at 12:59

## 2025-01-13 RX ADMIN — PENTOXIFYLLINE 400 MG: 400 TABLET, EXTENDED RELEASE ORAL at 12:56

## 2025-01-13 RX ADMIN — OXYCODONE HYDROCHLORIDE 10 MG: 10 TABLET ORAL at 14:39

## 2025-01-13 RX ADMIN — GABAPENTIN 600 MG: 300 CAPSULE ORAL at 17:18

## 2025-01-13 RX ADMIN — OXYCODONE HYDROCHLORIDE 10 MG: 10 TABLET ORAL at 22:52

## 2025-01-13 RX ADMIN — METHOCARBAMOL 500 MG: 500 TABLET ORAL at 17:01

## 2025-01-13 RX ADMIN — ACETAMINOPHEN 975 MG: 325 TABLET, FILM COATED ORAL at 22:52

## 2025-01-13 RX ADMIN — PANTOPRAZOLE SODIUM 40 MG: 40 TABLET, DELAYED RELEASE ORAL at 17:01

## 2025-01-13 RX ADMIN — POTASSIUM CHLORIDE 20 MEQ: 1500 TABLET, EXTENDED RELEASE ORAL at 08:44

## 2025-01-13 RX ADMIN — FINASTERIDE 5 MG: 5 TABLET, FILM COATED ORAL at 08:43

## 2025-01-13 RX ADMIN — GABAPENTIN 600 MG: 300 CAPSULE ORAL at 08:44

## 2025-01-13 RX ADMIN — PANTOPRAZOLE SODIUM 40 MG: 40 TABLET, DELAYED RELEASE ORAL at 05:02

## 2025-01-13 RX ADMIN — PENTOXIFYLLINE 400 MG: 400 TABLET, EXTENDED RELEASE ORAL at 17:18

## 2025-01-13 RX ADMIN — SUCRALFATE 1 G: 1 TABLET ORAL at 17:01

## 2025-01-13 RX ADMIN — EMPAGLIFLOZIN 10 MG: 10 TABLET, FILM COATED ORAL at 08:44

## 2025-01-13 RX ADMIN — FAMOTIDINE 20 MG: 20 TABLET, FILM COATED ORAL at 08:44

## 2025-01-13 RX ADMIN — TAMSULOSIN HYDROCHLORIDE 0.4 MG: 0.4 CAPSULE ORAL at 17:01

## 2025-01-13 RX ADMIN — PENTOXIFYLLINE 400 MG: 400 TABLET, EXTENDED RELEASE ORAL at 06:43

## 2025-01-13 RX ADMIN — ASPIRIN 81 MG CHEWABLE TABLET 81 MG: 81 TABLET CHEWABLE at 08:44

## 2025-01-13 RX ADMIN — VENLAFAXINE 25 MG: 25 TABLET ORAL at 08:44

## 2025-01-13 RX ADMIN — PREDNISONE 5 MG: 5 TABLET ORAL at 06:04

## 2025-01-13 RX ADMIN — EZETIMIBE 10 MG: 10 TABLET ORAL at 08:44

## 2025-01-13 RX ADMIN — OXYCODONE HYDROCHLORIDE 10 MG: 10 TABLET ORAL at 06:03

## 2025-01-13 RX ADMIN — GUAIFENESIN 600 MG: 600 TABLET, EXTENDED RELEASE ORAL at 22:52

## 2025-01-13 RX ADMIN — CLOPIDOGREL BISULFATE 75 MG: 75 TABLET, FILM COATED ORAL at 08:44

## 2025-01-13 RX ADMIN — FERROUS SULFATE TAB 325 MG (65 MG ELEMENTAL FE) 325 MG: 325 (65 FE) TAB at 08:44

## 2025-01-13 RX ADMIN — METHOCARBAMOL 500 MG: 500 TABLET ORAL at 08:44

## 2025-01-13 RX ADMIN — ONDANSETRON 4 MG: 2 INJECTION INTRAMUSCULAR; INTRAVENOUS at 13:00

## 2025-01-13 RX ADMIN — GUAIFENESIN 600 MG: 600 TABLET, EXTENDED RELEASE ORAL at 08:43

## 2025-01-13 RX ADMIN — SUCRALFATE 1 G: 1 TABLET ORAL at 22:52

## 2025-01-13 RX ADMIN — ACETAMINOPHEN 975 MG: 325 TABLET, FILM COATED ORAL at 05:02

## 2025-01-13 RX ADMIN — ATORVASTATIN CALCIUM 80 MG: 80 TABLET, FILM COATED ORAL at 17:01

## 2025-01-13 RX ADMIN — HYDROMORPHONE HYDROCHLORIDE 0.2 MG: 0.2 INJECTION, SOLUTION INTRAMUSCULAR; INTRAVENOUS; SUBCUTANEOUS at 06:43

## 2025-01-13 RX ADMIN — ENOXAPARIN SODIUM 40 MG: 40 INJECTION SUBCUTANEOUS at 08:43

## 2025-01-13 RX ADMIN — ALLOPURINOL 100 MG: 100 TABLET ORAL at 08:44

## 2025-01-13 RX ADMIN — METHOCARBAMOL 500 MG: 500 TABLET ORAL at 22:52

## 2025-01-13 RX ADMIN — PREDNISONE 5 MG: 5 TABLET ORAL at 17:01

## 2025-01-13 RX ADMIN — HYDROMORPHONE HYDROCHLORIDE 0.2 MG: 0.2 INJECTION, SOLUTION INTRAMUSCULAR; INTRAVENOUS; SUBCUTANEOUS at 15:48

## 2025-01-13 NOTE — ASSESSMENT & PLAN NOTE
History of prostate cancer follows with oncology receiving chemotherapy (last on 12/12)  Follows with Dr. Hernandez  Current regimen is Cabazitaxel, Neulasta, prednisone, Lupron, denosumab  Continue to follow in outpatient setting  On prednisone 5 mg twice daily-no postoperative-potential and requiring stress dose of steroids

## 2025-01-13 NOTE — PROGRESS NOTES
Podiatry - Progress Note  Patient: Royce Rene 79 y.o. male   MRN: [id number]  PCP: Anne Chandra MD  Unit/Bed#: [facility] 523-01 Encounter: [id number]  Date Of Visit: 25    ASSESSMENT:    Royce Rene is a 79 y.o. male with:    Right forefoot wet gangrene, stable, Poole 4   -S/p right AKA DOS 2025  Left foot dry gangrene, Poole stage 4  Ischemic pain to bilateral lower extremities  Ulceration left lateral midfoot   Severe peripheral vascular disease    PLAN:    Patient seen and evaluated bedside with all questions and concerns addressed.  Left foot eschars are stable with no acute clinical signs of infection, however noted progression of ischemic changes to digits.   Plan to continue to monitor left foot closely for demarcation, no indication for acute podiatric surgical intervention at this time.  Continue local wound care, appreciate nursing assistance with dressing changes.  Elevation and offloading on green foam wedges or pillows when non-ambulatory.  Rest of care per primary team.     Weightbearing status: Weightbearing as tolerated    SUBJECTIVE:     The patient was seen, evaluated, and assessed at bedside today. The patient was awake, alert, and in no acute distress. No acute events overnight. The patient reports no pain in his left foot at this time. Patient denies N/V/F/chills/SOB/CP.      OBJECTIVE:     Vitals:   BP (!) 101/48   Pulse 93   Temp 98.3 °F (36.8 °C)   Resp 21   Wt 89.2 kg (196 lb 10.4 oz)   SpO2 96%   BMI 29.90 kg/m²     Temp (24hrs), Av.3 °F (36.8 °C), Min:98 °F (36.7 °C), Max:98.6 °F (37 °C)      Physical Exam:     Lungs: Non labored breathing  Abdomen: Soft, non-tender.  Lower Extremity:  Cardiovascular status at baseline from admission.  Neurological status at baseline from admission.  S/p right AKA. No calf tenderness noted.     Left foot: Firmly adhered dry eschar to distal aspect of second digit, ischemic changes to digits 3 and 4 concerning for progression of tissue  "loss. No signs of active infection: no purulence, no malodor, no ascending erythema, no crepitus, no fluctuance.      Clinical Images 01/13/25:      Additional Data:     Labs:    Results from last 7 days   Lab Units 01/12/25  0431 01/10/25  0403 01/09/25  0431   WBC Thousand/uL 9.62   < > 14.00*   HEMOGLOBIN g/dL 7.3*   < > 8.1*   HEMATOCRIT % 25.0*   < > 27.2*   PLATELETS Thousands/uL 438*   < > 488*   SEGS PCT %  --   --  85*   LYMPHO PCT %  --   --  6*   MONO PCT %  --   --  6   EOS PCT %  --   --  1    < > = values in this interval not displayed.     Results from last 7 days   Lab Units 01/12/25  0431   POTASSIUM mmol/L 3.6   CHLORIDE mmol/L 110*   CO2 mmol/L 22   BUN mg/dL 9   CREATININE mg/dL 0.68   CALCIUM mg/dL 8.1*   ALK PHOS U/L 88   ALT U/L 27   AST U/L 17           * I Have Reviewed All Lab Data Listed Above.    Recent Cultures (last 7 days):     Results from last 7 days   Lab Units 01/11/25  1827   C DIFF TOXIN B BY PCR  Negative           Imaging: I have personally reviewed pertinent films in PACS  EKG, Pathology, and Other Studies: I have personally reviewed pertinent reports.    ** Please Note: Portions of the record may have been created with voice recognition software. Occasional wrong word or \"sound a like\" substitutions may have occurred due to the inherent limitations of voice recognition software. Read the chart carefully and recognize, using context, where substitutions have occurred. **      "

## 2025-01-13 NOTE — ASSESSMENT & PLAN NOTE
"Recent Labs     01/12/25  0431   SODIUM 140     No results found for: \"OSMOUA\", \"NAUR\", \"OSMOLALITSER\"    Resolved    "

## 2025-01-13 NOTE — PHYSICAL THERAPY NOTE
Physical Therapy Cancellation Note             01/13/25 1125   PT Last Visit   PT Visit Date 01/13/25   Note Type   Note Type Cancelled Session   Cancel Reasons Refusal     PT attempted treatment session. Patient adamantly refused at this time. Patient educated on the importance of functional mobility throughout hospital stay; continues to refuse at this time due to continued nausea and fatigue. PT will continue to follow as able and appropriate.     Kyra Ross PT, DPT

## 2025-01-13 NOTE — OCCUPATIONAL THERAPY NOTE
OT CANCEL NOTE:    Attempted to see pt for OT treatment session this morning. Pt in bed, stating he does not want to do anything today. Educated on importance of mobility, pt continues to refuse. Will continue to follow and treat as able.

## 2025-01-13 NOTE — ASSESSMENT & PLAN NOTE
Wt Readings from Last 3 Encounters:   01/13/25 89.2 kg (196 lb 10.4 oz)   12/26/24 100 kg (220 lb 14.4 oz)   12/13/24 103 kg (227 lb)   Home diuretic: PO Lasix 40mg QD -Lasix on hold due to poor p.o. intake  Continue metoprolol, losartan, Jardiance  Low-salt diet  Monitor I/O, daily weights

## 2025-01-13 NOTE — PLAN OF CARE
Problem: PAIN - ADULT  Goal: Verbalizes/displays adequate comfort level or baseline comfort level  Description: Interventions:  - Encourage patient to monitor pain and request assistance  - Assess pain using appropriate pain scale  - Administer analgesics based on type and severity of pain and evaluate response  - Implement non-pharmacological measures as appropriate and evaluate response  - Consider cultural and social influences on pain and pain management  - Notify physician/advanced practitioner if interventions unsuccessful or patient reports new pain  Outcome: Progressing     Problem: SAFETY ADULT  Goal: Patient will remain free of falls  Description: INTERVENTIONS:  - Educate patient/family on patient safety including physical limitations  - Instruct patient to call for assistance with activity   - Consult OT/PT to assist with strengthening/mobility   - Keep Call bell within reach  - Keep bed low and locked with side rails adjusted as appropriate  - Keep care items and personal belongings within reach  - Initiate and maintain comfort rounds  - Make Fall Risk Sign visible to staff  Problem: Prexisting or High Potential for Compromised Skin Integrity  Goal: Skin integrity is maintained or improved  Description: INTERVENTIONS:  - Identify patients at risk for skin breakdown  - Assess and monitor skin integrity  - Assess and monitor nutrition and hydration status  - Monitor labs   - Assess for incontinence   - Turn and reposition patient  - Assist with mobility/ambulation  - Relieve pressure over bony prominences  - Avoid friction and shearing  - Provide appropriate hygiene as needed including keeping skin clean and dry  - Evaluate need for skin moisturizer/barrier cream  - Collaborate with interdisciplinary team   - Patient/family teaching  - Consider wound care consult   Outcome: Progressing     - Apply yellow socks and bracelet for high fall risk patients  - Consider moving patient to room near nurses  station  Outcome: Progressing

## 2025-01-13 NOTE — ASSESSMENT & PLAN NOTE
Right foot great toe gangrene with concern for underlying osteomyelitis  Severe peripheral vascular disease  Ulceration left foot midfoot lateral  History of extensive peripheral arterial disease  Podiatry and Vascular onboard  Continue aspirin, Plavix, atorvastatin, pentoxifylline  Transferred to [facility] on 1/1/2025.  Discussed with podiatry and vascular.  Per vascular the CTA of the lower extremity shows very severe disease.    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   Pain control  Vascular surgery cleared patient for discharge  Antibiotics discontinued

## 2025-01-14 PROBLEM — E87.1 HYPONATREMIA: Status: RESOLVED | Noted: 2024-12-26 | Resolved: 2025-01-14

## 2025-01-14 PROBLEM — E43 SEVERE PROTEIN-CALORIE MALNUTRITION (HCC): Status: ACTIVE | Noted: 2025-01-14

## 2025-01-14 PROCEDURE — 99222 1ST HOSP IP/OBS MODERATE 55: CPT | Performed by: INTERNAL MEDICINE

## 2025-01-14 PROCEDURE — 87329 GIARDIA AG IA: CPT

## 2025-01-14 PROCEDURE — 83993 ASSAY FOR CALPROTECTIN FECAL: CPT

## 2025-01-14 PROCEDURE — 82705 FATS/LIPIDS FECES QUAL: CPT

## 2025-01-14 PROCEDURE — 82653 EL-1 FECAL QUANTITATIVE: CPT

## 2025-01-14 PROCEDURE — 97535 SELF CARE MNGMENT TRAINING: CPT

## 2025-01-14 PROCEDURE — 99232 SBSQ HOSP IP/OBS MODERATE 35: CPT | Performed by: ANESTHESIOLOGY

## 2025-01-14 PROCEDURE — 99232 SBSQ HOSP IP/OBS MODERATE 35: CPT | Performed by: NURSE PRACTITIONER

## 2025-01-14 PROCEDURE — 84302 ASSAY OF SWEAT SODIUM: CPT

## 2025-01-14 RX ADMIN — VENLAFAXINE 25 MG: 25 TABLET ORAL at 08:48

## 2025-01-14 RX ADMIN — METHOCARBAMOL 500 MG: 500 TABLET ORAL at 16:36

## 2025-01-14 RX ADMIN — PENTOXIFYLLINE 400 MG: 400 TABLET, EXTENDED RELEASE ORAL at 08:47

## 2025-01-14 RX ADMIN — PANTOPRAZOLE SODIUM 40 MG: 40 TABLET, DELAYED RELEASE ORAL at 16:37

## 2025-01-14 RX ADMIN — PANTOPRAZOLE SODIUM 40 MG: 40 TABLET, DELAYED RELEASE ORAL at 04:40

## 2025-01-14 RX ADMIN — OXYCODONE HYDROCHLORIDE 10 MG: 10 TABLET ORAL at 04:40

## 2025-01-14 RX ADMIN — FINASTERIDE 5 MG: 5 TABLET, FILM COATED ORAL at 08:39

## 2025-01-14 RX ADMIN — METOPROLOL SUCCINATE 25 MG: 25 TABLET, EXTENDED RELEASE ORAL at 08:39

## 2025-01-14 RX ADMIN — SUCRALFATE 1 G: 1 TABLET ORAL at 10:50

## 2025-01-14 RX ADMIN — ACETAMINOPHEN 975 MG: 325 TABLET, FILM COATED ORAL at 04:40

## 2025-01-14 RX ADMIN — LOPERAMIDE HYDROCHLORIDE 2 MG: 2 CAPSULE ORAL at 17:28

## 2025-01-14 RX ADMIN — METHOCARBAMOL 500 MG: 500 TABLET ORAL at 08:39

## 2025-01-14 RX ADMIN — GABAPENTIN 600 MG: 300 CAPSULE ORAL at 08:39

## 2025-01-14 RX ADMIN — OXYCODONE HYDROCHLORIDE 10 MG: 10 TABLET ORAL at 10:50

## 2025-01-14 RX ADMIN — PREDNISONE 5 MG: 5 TABLET ORAL at 08:46

## 2025-01-14 RX ADMIN — SUCRALFATE 1 G: 1 TABLET ORAL at 04:40

## 2025-01-14 RX ADMIN — FAMOTIDINE 20 MG: 20 TABLET, FILM COATED ORAL at 08:39

## 2025-01-14 RX ADMIN — METHOCARBAMOL 500 MG: 500 TABLET ORAL at 21:54

## 2025-01-14 RX ADMIN — ONDANSETRON 4 MG: 2 INJECTION INTRAMUSCULAR; INTRAVENOUS at 08:44

## 2025-01-14 RX ADMIN — POTASSIUM CHLORIDE 20 MEQ: 1500 TABLET, EXTENDED RELEASE ORAL at 08:39

## 2025-01-14 RX ADMIN — BISMUTH SUBSALICYLATE 524 MG: 525 LIQUID ORAL at 14:00

## 2025-01-14 RX ADMIN — DICLOFENAC SODIUM 2 G: 10 GEL TOPICAL at 11:56

## 2025-01-14 RX ADMIN — EMPAGLIFLOZIN 10 MG: 10 TABLET, FILM COATED ORAL at 08:44

## 2025-01-14 RX ADMIN — PENTOXIFYLLINE 400 MG: 400 TABLET, EXTENDED RELEASE ORAL at 11:56

## 2025-01-14 RX ADMIN — PREDNISONE 5 MG: 5 TABLET ORAL at 16:36

## 2025-01-14 RX ADMIN — ENOXAPARIN SODIUM 40 MG: 40 INJECTION SUBCUTANEOUS at 08:46

## 2025-01-14 RX ADMIN — PENTOXIFYLLINE 400 MG: 400 TABLET, EXTENDED RELEASE ORAL at 16:36

## 2025-01-14 RX ADMIN — GUAIFENESIN 600 MG: 600 TABLET, EXTENDED RELEASE ORAL at 08:39

## 2025-01-14 RX ADMIN — SUCRALFATE 1 G: 1 TABLET ORAL at 21:54

## 2025-01-14 RX ADMIN — FERROUS SULFATE TAB 325 MG (65 MG ELEMENTAL FE) 325 MG: 325 (65 FE) TAB at 08:39

## 2025-01-14 RX ADMIN — GABAPENTIN 600 MG: 300 CAPSULE ORAL at 19:19

## 2025-01-14 RX ADMIN — EZETIMIBE 10 MG: 10 TABLET ORAL at 08:39

## 2025-01-14 RX ADMIN — ASPIRIN 81 MG CHEWABLE TABLET 81 MG: 81 TABLET CHEWABLE at 08:39

## 2025-01-14 RX ADMIN — TAMSULOSIN HYDROCHLORIDE 0.4 MG: 0.4 CAPSULE ORAL at 16:36

## 2025-01-14 RX ADMIN — CLOPIDOGREL BISULFATE 75 MG: 75 TABLET, FILM COATED ORAL at 08:39

## 2025-01-14 RX ADMIN — ATORVASTATIN CALCIUM 80 MG: 80 TABLET, FILM COATED ORAL at 16:36

## 2025-01-14 RX ADMIN — ACETAMINOPHEN 975 MG: 325 TABLET, FILM COATED ORAL at 21:54

## 2025-01-14 RX ADMIN — ACETAMINOPHEN 975 MG: 325 TABLET, FILM COATED ORAL at 14:00

## 2025-01-14 RX ADMIN — HYDROMORPHONE HYDROCHLORIDE 0.2 MG: 0.2 INJECTION, SOLUTION INTRAMUSCULAR; INTRAVENOUS; SUBCUTANEOUS at 07:20

## 2025-01-14 RX ADMIN — HYDROMORPHONE HYDROCHLORIDE 0.2 MG: 0.2 INJECTION, SOLUTION INTRAMUSCULAR; INTRAVENOUS; SUBCUTANEOUS at 00:13

## 2025-01-14 RX ADMIN — SUCRALFATE 1 G: 1 TABLET ORAL at 16:36

## 2025-01-14 RX ADMIN — LOPERAMIDE HYDROCHLORIDE 2 MG: 2 CAPSULE ORAL at 11:56

## 2025-01-14 RX ADMIN — DICLOFENAC SODIUM 2 G: 10 GEL TOPICAL at 08:48

## 2025-01-14 RX ADMIN — GUAIFENESIN 600 MG: 600 TABLET, EXTENDED RELEASE ORAL at 21:54

## 2025-01-14 RX ADMIN — ALLOPURINOL 100 MG: 100 TABLET ORAL at 08:39

## 2025-01-14 NOTE — CONSULTS
Gastroenterology Consult Note  Royce Rene, male, 79 y.o., 1945,  [facility] [id number]     Reason for consultation: Chronic diarrhea     History of present illness:  Royce Rene is a 79 y.o. male w/ PMH of Stage IV prostate cancer receiving chemotherapy (last dose 12/12/2024), CAD, HFpEF w/ AICD, and peripheral arterial disease s/p R AKA who presented on the 28th with generalized weakness.     He noticed diarrhea that began a few days after his last round of chemo on 12/12. He has had one to ten watery bowel movements since the diarrhea began. The diarrhea is watery and nonbloody. He started on loperamide on the 8th and takes it two to three times a day, saying the symptoms have improved. He also describes nausea and vomiting - about one episode of vomiting a day. He is having a mostly liquid based diet; he was eating solids foods earlier this stay but isn't anymore. There is no fever, bloating, abdominal pain, chills.     Assessment and Plan  1. Chronic diarrhea, secondary to cabazitaxel   - Etiology likely secondary to chemotherapy - microtubule inhibitors   - Plan:   - Order stool electrolytes: Na to assess osmotic vs secretory diarrhea    - Consider expanded differential - ordered Giardia Ag, fecal fat qualitative, fecal elastase, fecal calprotectin   - If no spontaneous resolution in next few weeks, can do outpatient colonoscopy   - Continue loperamide for supportive care       Oncology History:    Cancer Staging   Prostate cancer (HCC)  Staging form: Prostate, AJCC 8th Edition  - Clinical: Stage IVB (cT3b, pM1b, Grade Group: 4) - Signed by Jung Hernandez MD on 8/1/2023    Oncology History   Prostate cancer (HCC)   7/7/2021 Initial Diagnosis    Prostate cancer ([facility])     8/1/2023 -  Cancer Staged    Staging form: Prostate, AJCC 8th Edition  - Clinical: Stage IVB (cT3b, pM1b, Grade Group: 4) - Signed by Jung Hernandez MD on 8/1/2023  Francisco Javier score: 8  Histologic grading system: 5 grade system        8/7/2024 -  Chemotherapy    alteplase (CATHFLO), 2 mg, Intracatheter, Every 1 Minute as needed, 6 of 8 cycles  pegfilgrastim (NEULASTA ONPRO), 6 mg, Subcutaneous, Once, 6 of 8 cycles  Administration: 6 mg (8/7/2024), 6 mg (8/28/2024), 6 mg (9/18/2024), 6 mg (10/9/2024), 6 mg (11/21/2024), 6 mg (12/12/2024)  cabazitaxel (JEVTANA) IVPB, 20 mg/m2 = 42 mg (80 % of original dose 25 mg/m2), Intravenous, Once, 6 of 8 cycles  Dose modification: 20 mg/m2 (original dose 25 mg/m2, Cycle 1, Reason: Dose modified as per discussion with consulting physician), 15 mg/m2 (original dose 25 mg/m2, Cycle 2, Reason: Anticipated Tolerance)  Administration: 42 mg (8/7/2024), 32 mg (8/28/2024), 32 mg (9/18/2024), 32 mg (10/9/2024), 32 mg (11/21/2024), 32 mg (12/12/2024)         Past Medical History:   Past Medical History:   Diagnosis Date    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    Coronary artery disease 2001    S/p stenting to circumflex and RCA    HFrEF (heart failure with reduced ejection fraction) ([facility]) 06/14/2021    High cholesterol     Pacemaker     Prostate cancer ([facility])        Past Surgical History:   Procedure Laterality Date    AMPUTATION ABOVE KNEE (AKA) Right 1/8/2025    Procedure: RIGHT AMPUTATION ABOVE KNEE (AKA);  Surgeon: Serina Cook DO;  Location: [facility];  Service: Vascular    CARDIAC CATHETERIZATION      CARDIAC ELECTROPHYSIOLOGY PROCEDURE N/A 12/23/2021    Procedure: Implant ICD - bi ventricular;  Surgeon: Jonas Oviedo MD;  Location: [facility] CARDIAC CATH LAB;  Service: Cardiology    COLONOSCOPY      IR LOWER EXTREMITY ANGIOGRAM  04/18/2023    IR LOWER EXTREMITY ANGIOGRAM  10/30/2024    IR LOWER EXTREMITY ANGIOGRAM  1/7/2025    AR TEAEC W/WO PATCH GRAFT COMMON FEMORAL Right 07/09/2021    Procedure: ENDARTERECTOMY ARTERIAL FEMORAL;  Surgeon: Serina Cook DO;  Location: [facility];  Service: Vascular       Family History   Problem Relation Age of Onset    Cancer Mother        Social History     Socioeconomic  History    Marital status:      Spouse name: Not on file    Number of children: 3    Years of education: Not on file    Highest education level: Not on file   Occupational History    Not on file   Tobacco Use    Smoking status: Former     Current packs/day: 0.00     Types: Cigarettes     Start date: 1962     Quit date: 2002     Years since quittin.5     Passive exposure: Past    Smokeless tobacco: Never   Vaping Use    Vaping status: Never Used   Substance and Sexual Activity    Alcohol use: Not Currently    Drug use: Not Currently    Sexual activity: Not on file   Other Topics Concern    Not on file   Social History Narrative    Single    Son lives in the attic of his home, minimal contact     Social Drivers of Health     Financial Resource Strain: Low Risk  (2023)    Overall Financial Resource Strain (CARDIA)     Difficulty of Paying Living Expenses: Not hard at all   Food Insecurity: No Food Insecurity (2025)    Hunger Vital Sign     Worried About Running Out of Food in the Last Year: Never true     Ran Out of Food in the Last Year: Never true   Transportation Needs: No Transportation Needs (2025)    PRAPARE - Transportation     Lack of Transportation (Medical): No     Lack of Transportation (Non-Medical): No   Recent Concern: Transportation Needs - Unmet Transportation Needs (2024)    OASIS : Transportation     Lack of Transportation (Medical): Yes     Lack of Transportation (Non-Medical): No     Patient Unable or Declines to Respond: No   Physical Activity: Not on file   Stress: Not on file   Social Connections: Not on file   Intimate Partner Violence: Unknown (2024)    Nursing IPS     Feels Physically and Emotionally Safe: Not on file     Physically Hurt by Someone: Not on file     Humiliated or Emotionally Abused by Someone: Not on file     Physically Hurt by Someone: No     Hurt or Threatened by Someone: No   Housing Stability: Low Risk  (2025)    Housing  Stability Vital Sign     Unable to Pay for Housing in the Last Year: No     Number of Times Moved in the Last Year: 0     Homeless in the Last Year: No         Current Facility-Administered Medications:     acetaminophen (TYLENOL) tablet 975 mg, 975 mg, Oral, Q8H DEWAYNE, Joshua Amador MD, 975 mg at 01/14/25 0440    allopurinol (ZYLOPRIM) tablet 100 mg, 100 mg, Oral, Daily, Joshua Amador MD, 100 mg at 01/14/25 0839    aspirin chewable tablet 81 mg, 81 mg, Oral, Daily, Joshua Amador MD, 81 mg at 01/14/25 0839    atorvastatin (LIPITOR) tablet 80 mg, 80 mg, Oral, Daily With Dinner, Joshua Amador MD, 80 mg at 01/13/25 1701    bismuth subsalicylate (PEPTO BISMOL) oral suspension 524 mg, 524 mg, Oral, Q6H PRN, Joshua Amador MD, 524 mg at 01/12/25 1428    clopidogrel (PLAVIX) tablet 75 mg, 75 mg, Oral, Daily, Joshua Amador MD, 75 mg at 01/14/25 0839    Diclofenac Sodium (VOLTAREN) 1 % topical gel 2 g, 2 g, Topical, 4x Daily, Joshua Amador MD, 2 g at 01/14/25 0848    Empagliflozin (JARDIANCE) tablet 10 mg, 10 mg, Oral, QAM, Kelli Pennington MD, 10 mg at 01/14/25 0844    enoxaparin (LOVENOX) subcutaneous injection 40 mg, 40 mg, Subcutaneous, Daily, Joshua Amador MD, 40 mg at 01/14/25 0846    ezetimibe (ZETIA) tablet 10 mg, 10 mg, Oral, Daily, Joshua Amador MD, 10 mg at 01/14/25 0839    famotidine (PEPCID) tablet 20 mg, 20 mg, Oral, Daily, Joshua Amador MD, 20 mg at 01/14/25 0839    ferrous sulfate tablet 325 mg, 325 mg, Oral, Daily, Joshua Amador MD, 325 mg at 01/14/25 0839    finasteride (PROSCAR) tablet 5 mg, 5 mg, Oral, Daily, Joshua Amador MD, 5 mg at 01/14/25 0839    [Held by provider] furosemide (LASIX) tablet 40 mg, 40 mg, Oral, Daily, Joshua Amador MD, 40 mg at 01/04/25 0905    gabapentin (NEURONTIN) capsule 600 mg, 600 mg, Oral, BID,  Joshua Amador MD, 600 mg at 01/14/25 0839    guaiFENesin (MUCINEX) 12 hr tablet 600 mg, 600 mg, Oral, Q12H DEWAYNE, Kelli Pennington MD, 600 mg at 01/14/25 0839    HYDROmorphone HCl (DILAUDID) injection 0.2 mg, 0.2 mg, Intravenous, Q4H PRN, Joshua Amador MD, 0.2 mg at 01/14/25 0720    loperamide (IMODIUM) capsule 2 mg, 2 mg, Oral, 4x Daily PRN, Joshua Amador MD, 2 mg at 01/12/25 1316    [Held by provider] losartan (COZAAR) tablet 12.5 mg, 12.5 mg, Oral, Daily, Joshua Amador MD    menthol-methyl salicylate (BENGAY) 10-15 % cream, , Apply externally, 4x Daily PRN, Joshua Amador MD, Given at 01/03/25 2136    methocarbamol (ROBAXIN) tablet 500 mg, 500 mg, Oral, TID, Joshua Amador MD, 500 mg at 01/14/25 0839    metoprolol succinate (TOPROL-XL) 24 hr tablet 25 mg, 25 mg, Oral, Daily, Joshua Amador MD, 25 mg at 01/14/25 0839    naloxone (NARCAN) 0.04 mg/mL syringe 0.04 mg, 0.04 mg, Intravenous, Q1MIN PRN, Joshua Amador MD    ondansetron (ZOFRAN) injection 4 mg, 4 mg, Intravenous, Q6H PRN, Joshua Amador MD, 4 mg at 01/14/25 0844    oxyCODONE (ROXICODONE) IR tablet 5 mg, 5 mg, Oral, Q4H PRN **OR** oxyCODONE (ROXICODONE) immediate release tablet 10 mg, 10 mg, Oral, Q4H PRN, Joshua Amador MD, 10 mg at 01/14/25 0440    pantoprazole (PROTONIX) EC tablet 40 mg, 40 mg, Oral, BID AC, Kelli Pennington MD, 40 mg at 01/14/25 0440    pentoxifylline (TRENtal) ER tablet 400 mg, 400 mg, Oral, TID With Meals, Joshua Amador MD, 400 mg at 01/14/25 0847    potassium chloride (Klor-Con M20) CR tablet 20 mEq, 20 mEq, Oral, Daily, Joshua Amador MD, 20 mEq at 01/14/25 0839    predniSONE tablet 5 mg, 5 mg, Oral, BID With Meals, Joshua Amador MD, 5 mg at 01/14/25 0846    senna (SENOKOT) tablet 17.2 mg, 2 tablet, Oral, HS, Kelli Pennington MD    simethicone (MYLICON)  chewable tablet 80 mg, 80 mg, Oral, Q6H PRN, Kelli Pennington MD, 80 mg at 25 1259    sucralfate (CARAFATE) tablet 1 g, 1 g, Oral, 4x Daily (AC & HS), Kelli Pennington MD, 1 g at 25 0440    tamsulosin (FLOMAX) capsule 0.4 mg, 0.4 mg, Oral, Daily With Dinner, Joshua Amador MD, 0.4 mg at 25 1701    venlafaxine (EFFEXOR) tablet 25 mg, 25 mg, Oral, Daily, Joshua Amador MD, 25 mg at 25 0848      Medications Prior to Admission:     Accu-Chek FastClix Lancets MISC    acetaminophen (TYLENOL) 500 mg tablet    Alcohol Swabs (Alcohol Pads) 70 % PADS    allopurinol (ZYLOPRIM) 100 mg tablet    aspirin 81 mg chewable tablet    atorvastatin (LIPITOR) 80 mg tablet    bismuth subsalicylate (PEPTO BISMOL) 262 MG chewable tablet    Blood Glucose Monitoring Suppl (Accu-Chek Guide Me) w/Device KIT    [] calcium gluconate 1-0.675 GM/50ML-% IVPB    clopidogrel (PLAVIX) 75 mg tablet    Empagliflozin (Jardiance) 10 MG TABS tablet    ezetimibe (ZETIA) 10 mg tablet    famotidine (PEPCID) 20 mg tablet    ferrous sulfate 325 (65 Fe) mg tablet    finasteride (PROSCAR) 5 mg tablet    furosemide (LASIX) 20 mg tablet    gabapentin (NEURONTIN) 300 mg capsule    glucose blood (Accu-Chek Guide) test strip    Lancet Devices (Lancing Device) MISC    loperamide (IMODIUM) 2 mg capsule    losartan (COZAAR) 25 mg tablet    methocarbamol (ROBAXIN) 500 mg tablet    metoprolol succinate (TOPROL-XL) 25 mg 24 hr tablet    ondansetron (ZOFRAN) 4 mg tablet    oxyCODONE (Roxicodone) 5 immediate release tablet    pentoxifylline (TRENtal) 400 mg ER tablet    polyethylene glycol (GLYCOLAX) 17 GM/SCOOP powder    potassium chloride (Klor-Con M20) 20 mEq tablet    predniSONE 5 mg tablet    tamsulosin (FLOMAX) 0.4 mg    No Known Allergies      Physical Exam:     /69   Pulse 97   Temp 98 °F (36.7 °C)   Resp 18   Wt 89.2 kg (196 lb 10.4 oz)   SpO2 96%   BMI 29.90 kg/m²     Physical  Exam  Constitutional:       Appearance: Normal appearance. He is not toxic-appearing.   HENT:      Head: Normocephalic and atraumatic.   Pulmonary:      Effort: Pulmonary effort is normal.   Abdominal:      General: There are no signs of injury.      Palpations: Abdomen is soft. There is no hepatomegaly, splenomegaly or mass.      Tenderness: There is abdominal tenderness (LLQ mild tenderness to deep palpation). There is no guarding or rebound.      Hernia: No hernia is present.   Skin:     General: Skin is warm and dry.      Coloration: Skin is not jaundiced or pale.   Neurological:      Mental Status: He is alert.       Recent Results (from the past 48 hours)   Type and screen    Collection Time: 01/12/25  4:26 PM   Result Value Ref Range    ABO Grouping O     Rh Factor Positive     Antibody Screen Negative     Specimen Expiration Date 20250115    Prepare Leukoreduced RBC: 1 Units    Collection Time: 01/13/25  5:55 AM   Result Value Ref Range    Unit Product Code [id number]     Unit Number [id number]     Unit ABO O     Unit RH POS     Crossmatch Compatible     Unit Dispense Status Presumed Trans     Unit Product Volume 350 mL   CBC and Platelet    Collection Time: 01/13/25  1:01 PM   Result Value Ref Range    WBC 9.13 4.31 - 10.16 Thousand/uL    RBC 3.40 (L) 3.88 - 5.62 Million/uL    Hemoglobin 9.7 (L) 12.0 - 17.0 g/dL    Hematocrit 31.1 (L) 36.5 - 49.3 %    MCV 92 82 - 98 fL    MCH 28.5 26.8 - 34.3 pg    MCHC 31.2 (L) 31.4 - 37.4 g/dL    RDW 15.9 (H) 11.6 - 15.1 %    Platelets 448 (H) 149 - 390 Thousands/uL    MPV 9.2 8.9 - 12.7 fL   Basic metabolic panel    Collection Time: 01/13/25  1:01 PM   Result Value Ref Range    Sodium 139 135 - 147 mmol/L    Potassium 3.5 3.5 - 5.3 mmol/L    Chloride 106 96 - 108 mmol/L    CO2 22 21 - 32 mmol/L    ANION GAP 11 4 - 13 mmol/L    BUN 13 5 - 25 mg/dL    Creatinine 0.68 0.60 - 1.30 mg/dL    Glucose 87 65 - 140 mg/dL    Calcium 7.9 (L) 8.4 - 10.2 mg/dL    eGFR 90  ml/min/1.73sq m       CT abdomen pelvis wo contrast  Result Date: 1/10/2025  Narrative: CT ABDOMEN AND PELVIS WITHOUT IV CONTRAST INDICATION: nausea. COMPARISON: CT abdomen runoff on 1/1/2025 TECHNIQUE: CT examination of the abdomen and pelvis was performed without intravenous contrast. Multiplanar 2D reformatted images were created from the source data. This examination, like all CT scans performed in the [facility], was performed utilizing techniques to minimize radiation dose exposure, including the use of iterative reconstruction and automated exposure control. Radiation dose length product (DLP) for this visit: 1423.44 mGy-cm Enteric Contrast: Not administered. FINDINGS: Exam is limited without IV and oral contrast particularly for soft tissue and bowel evaluation. ABDOMEN LOWER CHEST: ICD/pacemaker leads partially seen.. LIVER/BILIARY TREE: Minimal peripheral calcified granuloma. Otherwise unremarkable. GALLBLADDER: Cholelithiasis without findings of acute cholecystitis. SPLEEN: Calcified granulomata. No suspicious mass. PANCREAS: Unremarkable. ADRENAL GLANDS: Unremarkable. KIDNEYS/URETERS: Nonspecific bilateral perinephric stranding. No hydronephrosis. STOMACH AND BOWEL: Moderate gastric distention with debris. Scattered air-fluid levels in the colon. No small bowel dilatation. No obvious wall thickening. APPENDIX: No findings to suggest appendicitis. ABDOMINOPELVIC CAVITY: No ascites. No pneumoperitoneum. No lymphadenopathy. VESSELS: Atherosclerosis without abdominal aortic aneurysm. Partially visualized stent at the right SFA. PELVIS REPRODUCTIVE ORGANS: Unremarkable for patient's age. URINARY BLADDER: Moser catheter decompresses the bladder. ABDOMINAL WALL/INGUINAL REGIONS: Unremarkable. BONES: Stable sclerotic widespread osseous metastases from recent CT.     Impression: Exam is limited without IV and oral contrast particularly for soft tissue and bowel evaluation. 1. Moderate gastric  distention with air-fluid level. Scattered air-fluid levels in the colon. Small bowel is unremarkable. Findings are nonspecific but could represent underlying gastritis and/or colitis. Workstation performed: [id number]     XR chest portable  Result Date: 1/10/2025  Narrative: XR CHEST PORTABLE INDICATION: cough. COMPARISON: 4/3/2023 FINDINGS: Unchanged right IJ approach chest port catheter terminating in the cavoatrial junction. Clear lungs. No pneumothorax or pleural effusion. Stable cardiomegaly. Redemonstrated left-sided pacemaker/AICD with stable position of electrodes. Redemonstrated TAVR. Bones are unremarkable for age. Normal upper abdomen.     Impression: No acute pulmonary disease. Stable cardiomegaly. Workstation performed: [id number]     US bedside procedure  Result Date: 1/8/2025  Narrative: [id number]    IR lower extremity angiogram  Result Date: 1/8/2025  Narrative: PROCEDURE: 1.  Real-time ultrasound-guided access of the right common femoral artery 2.  Aortography and pelvic arteriography 3.  Left lower extremity runoff arteriography 4.  Intravascular lithotripsy of the left common femoral artery and ostial left superficial femoral artery 5.  Intravascular lithotripsy of the left tibioperoneal trunk and proximal left posterior tibial artery STAFF: Gavin Sykes M.D. and [place] Brandon M.D. CONTRAST: 70 mL Visipaque intra-arterial. FLUOROSCOPY TIME: 18 minutes. NUMBER OF IMAGES: Multiple COMPLICATIONS: None. MEDICATIONS: Sedation was provided in the form of monitored anesthesia care by the Anesthesia service.  Please see their associated records. INDICATION: History of left lower extremity critical limb threatening ischemia evidenced by a nonhealing left second toe wound. Of note, the patient also has more extensive wounds on the right side with an occluded stent construct. Right amputation is anticipated. Additional history of metastatic prostate cancer.  PROCEDURE: Real-time ultrasound was used to access the right common femoral artery. An image was stored in PACS. A sheath was placed. A wire and catheter combination was extended into the abdominal aorta, and aortography was performed. The catheter was then retracted to the level of the aortic bifurcation, and pelvic arteriography was performed. A wire and catheter combination was then extended into the contralateral left common femoral artery, and left lower extremity runoff arteriography was performed. Based upon the results of arteriography, intravascular lithotripsy of the left common femoral and ostial left superficial femoral artery was achieved utilizing a 6 mm Shockwave balloon. Approximately 240 pulses were instilled into the vessel at nominal pressures according to instructions for use. Interval arteriography was performed. Attention was then directed towards the tibial vasculature. A microwire and microcatheter combination was then extended into the posterior tibial artery, and interval arteriography was performed. Additional intravascular lithotripsy of the tibioperoneal trunk and proximal posterior tibial artery was then achieved utilizing a 3 mm Shockwave balloon. Approximately 300 pulses were instilled into the vessel abdominal pressures according to instructions for use. Interval arteriography was performed. Completion arteriography was performed. The wire, catheter, and sheath were removed. Hemostasis was achieved at the right groin using the StarClose vascular closure device. FINDINGS: 1.  Real-time ultrasound showed the right common femoral artery to be postsurgical but freely compressible. 2.  Aortography showed the abdominal aorta to be normal. 3.  Pelvic arteriography showed areas of mild atheroma but no significant flow-limiting disease. 4.  Left lower extremity runoff arteriography showed mildly flow-limiting eccentric atheroma of the left common femoral artery. Visualized branches the  profunda were hypertrophied. There was an area of critical calcific atheroma involving the ostial left  superficial femoral artery which was clearly flow-limiting. Remainder of the left superficial femoral artery was normal. There were areas of mild nonflow-limiting atheroma involving the left popliteal artery. Inline flow to the foot was provided by the left posterior tibial artery. Tibioperoneal trunk had areas of moderate eccentric atheroma. Anterior tibial artery was patent proximally before occlusion which did not reconstitute distally. Peroneal artery had areas of mild sclerotic disease. There was moderate eccentric atheroma involving the proximal posterior tibial artery. Distal posterior tibial artery at the level of the ankle had areas of multifocal narrowing. Calcaneal arteries were diffusely narrowed, consistent with small vessel disease. The pedal plantar arch was not intact, nor was the dorsalis pedis artery. 5.  Interval arteriography after lithotripsy of the left common femoral and superficial femoral arteries showed near complete resolution of atheroma involving the ostial left superficial femoral artery. Flow had markedly improved. The left common femoral  artery lesion only mildly responded. 6.  Interval arteriography after lithotripsy of the tibioperoneal trunk and posterior tibial artery showed mildly improved luminal caliber. 7.  Final runoff arteriography showed improved brisk filling of the inline posterior tibial artery. There was no distal embolization or other complication.     Impression: 1.  Intravascular lithotripsy of the left common femoral artery and ostial left superficial femoral artery. 2.  Intravascular lithotripsy of the left tibioperoneal trunk and proximal posterior tibial artery. Workstation performed: [id number]     CTA ABDOMEN W RUN OFF W WO CONTRAST  Result Date: 1/2/2025  Narrative: CT ANGIOGRAM OF THE AORTA AND LOWER EXTREMITIES WITH IV CONTRAST INDICATION: Bilateral foot  wounds. Multiple prior interventions. Known metastatic prostate cancer. COMPARISON: Fluoroscopy, dated 10/30/2024. PET/CT, dated 4/25/2024. CT, dated 1/18/2024. CT, dated 4/6/2023. TECHNIQUE: CT angiogram examination of the abdomen, pelvis, and lower extremities was performed according to standard protocol with intravenous contrast. This examination, like all CT scans performed in the [facility], was performed utilizing techniques to minimize radiation dose exposure, including the use of iterative reconstruction and automated exposure control. Rad dose 2813 mGy-cm IV Contrast: 100 mL of iohexol (OMNIPAQUE) Enteric Contrast: Not administered. FINDINGS: VESSELS: THORACIC VASCULAR STRUCTURES: HEART: Mild cardiac enlargement. Aortic valvular replacement. No pericardial effusion. Coronary arterial atherosclerotic calcifications. Incomplete visualization of pacer leads. PULMONARY ARTERIES: The main pulmonary artery is normal in size.  Nontargeted evaluation shows no central pulmonary embolism. ASCENDING AORTA: Non coronary-gated evaluation shows the ascending thoracic aorta to be normal in size and without calcifications. AORTIC ARCH: The three-vessel arch is normal. DESCENDING THORACIC AORTA: The descending thoracic aorta is normal in caliber, with mild atherosclerotic calcifcatations. ABDOMINAL VASCULAR STRUCTURES: AORTA: The abdominal aorta is normal in caliber.  There are mild atherosclerotic calcifications. CELIAC ARTERY: Conventional branching anatomy is present.  There are mild atherosclerotic calcifications at the ostia. SUPERIOR MESENTERIC ARTERY: Mild atherosclerotic calcifications at the origin. INFERIOR MESENTERIC ARTERY: Mild atherosclerotic calcifications at the origin. RIGHT RENAL ARTERY: The single renal artery has mild atherosclerotic calcifications at the origin. LEFT RENAL ARTERY: The single renal artery has mild atherosclerotic calcifications at the origin. LEFT LOWER EXTREMITY: LEFT  COMMON ILIAC ARTERY: Mild calcific atherosclerotic disease.  The vessel is normal in caliber. LEFT INTERNAL ILIAC ARTERY: Severe atherosclerotic disease, which limits evaluation of intraluminal contrast.  Visualized branches are normal in caliber. LEFT EXTERNAL ILIAC ARTERY: Mild calcific atherosclerotic disease.  The vessel is normal in caliber. LEFT COMMON FEMORAL ARTERY: Calcific occlusion versus severe stenosis of the left common femoral artery (series 301, image 228), progressed from the last comparison study in April 2023. LEFT PROFUNDA FEMORIS: Moderate calcific atherosclerotic disease.  The vessel is normal in caliber. LEFT SUPERFICIAL FEMORAL ARTERY: Calcific occlusion versus severe ostial stenosis (series 301, image 240). This has also worsened from a study in April 2023. Remainder of the left superficial femoral artery has areas of mild atherosclerotic narrowing. LEFT POPLITEAL ARTERY: Redemonstrated critical narrowing at the P2 segment (series 301, image 373), similar to the study performed last April. LEFT TIBIOPERONEAL TRUNK: Severe calcific atherosclerotic disease, which limits evaluation of intraluminal contrast.  The vessel is normal in caliber. LEFT ANTERIOR TIBIAL ARTERY: Severe calcific atherosclerotic disease, which limits evaluation of intraluminal contrast.  The vessel is normal in caliber. LEFT PERONEAL ARTERY: Severe calcific atherosclerotic disease, which limits evaluation of intraluminal contrast.  The vessel is normal in caliber. LEFT POSTERIOR TIBIAL ARTERY: Severe calcific atherosclerotic disease, which limits evaluation of intraluminal contrast.  The vessel is normal in caliber. LEFT FOOT: Visualized branches are normal. RIGHT LOWER EXTREMITY: RIGHT COMMON ILIAC ARTERY: Mild calcific atherosclerotic disease.  The vessel is normal in caliber. RIGHT INTERNAL ILIAC ARTERY: Severe atherosclerotic disease, which limits evaluation of intraluminal contrast.  Visualized branches are normal in  caliber. RIGHT EXTERNAL ILIAC ARTERY: Mild calcific atherosclerotic disease.  The vessel is normal in caliber. RIGHT COMMON FEMORAL ARTERY: Moderate calcific atherosclerotic disease.  The vessel is normal in caliber. RIGHT PROFUNDA FEMORIS: Moderate calcific atherosclerotic disease.  The vessel is normal in caliber. RIGHT SUPERFICIAL FEMORAL ARTERY: Occlusion of the right superficial femoral arterial stent construct. RIGHT POPLITEAL ARTERY: Diminutive reconstitution of flow at the P2 segment. Mild atherosclerotic narrowing distally. RIGHT TIBIOPERONEAL TRUNK: Severe calcific atherosclerotic disease, which limits evaluation of intraluminal contrast.  The vessel is normal in caliber. RIGHT ANTERIOR TIBIAL ARTERY: Severe calcific atherosclerotic disease, which limits evaluation of intraluminal contrast.  The vessel is normal in caliber. RIGHT PERONEAL ARTERY: Severe calcific atherosclerotic disease, which limits evaluation of intraluminal contrast.  The vessel is normal in caliber. RIGHT POSTERIOR TIBIAL ARTERY: Severe calcific atherosclerotic disease, which limits evaluation of intraluminal contrast.  The vessel is normal in caliber. RIGHT FOOT: Visualized branches are normal. VENOUS: Iliocaval system is normal in caliber and normal in opacification. OTHER FINDINGS ABDOMEN LOWER CHEST: No new or enlarging nodule. Redemonstrated intrapulmonary lymph node extending along the right minor fissure (series 301, image 45). Mild bibasilar reticulation. Small amount of atelectatic changes in the left upper lobe paramediastinal region. Central airways are normal. LIVER/BILIARY TREE: Unremarkable. GALLBLADDER: No calcified gallstones. No pericholecystic inflammatory change. SPLEEN: Splenic granulomata. PANCREAS: Unremarkable. ADRENAL GLANDS: Unremarkable. KIDNEYS/URETERS: Unremarkable. No hydronephrosis. PELVIS REPRODUCTIVE ORGANS: Unremarkable for patient's age. URINARY BLADDER: Unremarkable. ADDITIONAL ABDOMINAL AND PELVIC  STRUCTURES STOMACH AND BOWEL: Unremarkable. APPENDIX: No findings to suggest appendicitis. ABDOMINOPELVIC CAVITY: No ascites. No pneumoperitoneum. No lymphadenopathy. ABDOMINAL WALL/INGUINAL REGIONS: Unremarkable. BONES: Redemonstrated is diffuse osteoblastic metastatic disease through the visualized axial and appendicular skeleton, similar over multiple prior studies. No pathologic fracture.     Impression: Vascular: Left lower extremity: 1.  Calcific occlusion versus severe stenosis of the left common femoral artery and ostial left superficial femoral artery, progressed from the prior CTA of April 2023. 2.  Severe calcific narrowing of the left P2 segment, similar to April 2023. 3.  Redemonstrated severe calcific tibial vascular disease, limiting luminal evaluation. Right lower extremity: 1.  Occlusion of the right superficial femoral arterial stent construct. 2.  Redemonstrated severe calcific tibial vascular disease, limiting luminal evaluation. Nonvascular: Similar CT appearance of diffuse osteoblastic metastatic disease over multiple prior studies. No pathologic fracture. Workstation performed: [id number]     XR foot 3+ vw left  Result Date: 1/2/2025  Narrative: XR FOOT 3+ VW LEFT INDICATION: Wound on left ankle, r/o osteomyelitis. COMPARISON: None FINDINGS: There is a healing or incomplete fracture involving the lateral base of the fifth metatarsal bone. No convincing focal osseous destruction. There is a likely subacute, mildly displaced/angulated fracture involving the medial distal articular surface of the great toe, proximal phalanx. Moderate joint space narrowing and marginal spurring affecting the first MTP joint. Small plantar and posterior calcaneal enthesophytes. No lytic or blastic osseous lesion. Prominent vascular calcifications.     Impression: 1.  No focal osseous destruction. 2.  Likely subacute, mildly displaced/angulated fracture involving the proximal phalanx of the great toe. 3.   Incomplete/healing fracture involving the fifth metatarsal base. Computerized Assisted Algorithm (CAA) may have been used to analyze all applicable images. Workstation performed: [id number]     XR foot 3+ views RIGHT  Result Date: 12/26/2024  Narrative: XR FOOT 3+ VW RIGHT INDICATION: pain, ischemia. History of peripheral arterial disease; history of prostate cancer; chronic right lower extremity pain COMPARISON: 10/30/2024; 7/18/2022 FINDINGS: No acute fracture or dislocation. Degenerative changes at the fifth MTP. No bony destructive features are noted. There is sclerosis of the calcaneus slightly increased as compared to the study of October but not visible in 2022. Bone scan assessment may be useful. Calcaneal spurring is present. No lytic or blastic osseous lesion. Atherosclerotic calcifications. Otherwise unremarkable soft tissues. No subcutaneous emphysema can be appreciated.     Impression: No x-ray evidence of osteomyelitis at this time. Some sclerosis of the calcaneus is identified of indeterminate etiology. This would be an unusual location for metastatic disease. Subtle stress injury is possible. The study was marked in EPIC for immediate notification. Computerized Assisted Algorithm (CAA) may have been used to analyze all applicable images. Workstation performed: [id number]       Labs and pertinent reports reviewed.      This note has been generated by voice recognition software system.  Therefore, there may be spelling, grammar, and or syntax errors. Please contact if questions arise.    Yael Steven PGY-I Internal Medicine   [facility]

## 2025-01-14 NOTE — PLAN OF CARE
Problem: OCCUPATIONAL THERAPY ADULT  Goal: Performs self-care activities at highest level of function for planned discharge setting.  See evaluation for individualized goals.  Description: Treatment Interventions: ADL retraining, Functional transfer training, UE strengthening/ROM, Endurance training, Cognitive reorientation, Patient/family training, Equipment evaluation/education, Compensatory technique education, Continued evaluation, Energy conservation, Activityengagement          See flowsheet documentation for full assessment, interventions and recommendations.   Outcome: Progressing  Note: Limitation: Decreased ADL status, Decreased UE strength, Decreased Safe judgement during ADL, Decreased cognition, Decreased endurance, Decreased self-care trans, Decreased high-level ADLs  Prognosis: Good  Assessment: Pt seen on this date for OT session focusing on ADL retraining, cognitive reorientation,  increasing activity tolerance/endurance to increase ability to participate in ADL/functional tasks. Pt was found in bed and was left in bed w/ all needs within reach, bed alarm on. Pt rolled to R and L w mod ax1 for perineal hygiene, which he required total a to complete. Pt unable to perform additional bed mob 2' nausea and diarrhea, was refusing any further intervention.  Pt w/ improvements in activity tolerance, transfer ability, endurance, however is still limited 2* decreased ADL/High-level ADL status, decreased activity tolerance/endurance, decreased cognition, decreased self-care trans, decreased safety awareness and insight to condition.   The patient's raw score on the AM-PAC Daily Activity Inpatient Short Form is 14. A raw score of less than 19 suggests the patient may benefit from discharge to post-acute rehabilitation services. Please refer to the recommendation of the Occupational Therapist for safe discharge planning.   Recommending pt D/C to level 2 when medically stable. Pt will continue to benefit from  acute OT services to meet goals.     Rehab Resource Intensity Level, OT: I (Maximum Resource Intensity)

## 2025-01-14 NOTE — ASSESSMENT & PLAN NOTE
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    Received r sided single shot femoral and intragluteal sciatic blocks with exparel 1/8  Discussed given POD#6 and blocks only temporizing measure (18-36hr), procedural intervention not indicated    Multimodal Analgesia:  Tylenol 975 mg every 8 hours scheduled  Consider lidocaine 5% patch on front and back of thigh, may help with thigh pain/spasm    Gabapentin 600 mg 2 times daily, home medication  Consider modifying to 600mg/600mg/300mg instead of 600mg BID  Monitor and discontinue for oversedation  May consider Palliative consult as they manage these medications outpatient    Robaxin 500 mg every 8 hours, home medication  consider increasing to 750mg PO q8h for improved spasm pain  Monitor and discontinue for oversedation  May consider Palliative consult as they manage these medications outpatient    Venlafaxine 25 mg daily as second line agent for neuropathic pain  Defer further NSAIDs given already on ASA 81mg daily    Oxycodone 5 mg every 4 hours as needed for moderate pain  Oxycodone 10 mg every 4 hours as needed for severe pain  IV dilaudid 0.2 mg every 4 hours as needed for breakthrough pain  Narcan as needed for respiratory depression/opioid reversal

## 2025-01-14 NOTE — ASSESSMENT & PLAN NOTE
Right foot great toe gangrene with concern for underlying osteomyelitis  Severe peripheral vascular disease  Ulceration left foot midfoot lateral  History of extensive peripheral arterial disease  Podiatry and Vascular onboard  Continue aspirin, Plavix, atorvastatin, pentoxifylline  Transferred to [place] on 1/1/2025.   Per vascular the CTA of the lower extremity shows very severe disease.    Per vascular patient has severe SFA stent occlusion and distal left CFA and proximal left SFA occlusion  Palliative wound care advised  IR consuled for IR LLE -status a gram and shockwave CFA SFA TPT PT today by interventional radiology  Patient developed wet gangrene on the right and no more revascularization options available  Status post AKA by vascular surgery on 1/8- documented minimal blood loss.   Pain control  Vascular surgery cleared patient for discharge  Antibiotics discontinued postprocedure

## 2025-01-14 NOTE — PROGRESS NOTES
Progress Note - Hospitalist   Name: Royce Rene 79 y.o. male I MRN: [id number]  [facility] Date of Admission: 1/1/2025   Date of Service: 1/13/2025 I Hospital Day: 12    Assessment & Plan  PAD (peripheral artery disease) (HCC)    Right foot great toe gangrene with concern for underlying osteomyelitis  Severe peripheral vascular disease  Ulceration left foot midfoot lateral  History of extensive peripheral arterial disease  Podiatry and Vascular onboard  Continue aspirin, Plavix, atorvastatin, pentoxifylline  Transferred to [place] on 1/1/2025.   Per vascular the CTA of the lower extremity shows very severe disease.    Per vascular patient has severe SFA stent occlusion and distal left CFA and proximal left SFA occlusion  Palliative wound care advised  IR consuled for IR LLE -status a gram and shockwave CFA SFA TPT PT today by interventional radiology  Patient developed wet gangrene on the right and no more revascularization options available  Status post AKA by vascular surgery on 1/8- documented minimal blood loss.   Pain control  Vascular surgery cleared patient for discharge  Antibiotics discontinued postprocedure    Ambulatory dysfunction  Baseline- close to wheelchair bounded- minimal walk.  Fall precautions  Likely need rehab postprocedure.  PMR consult appreciated  Needs rehab  Chronic combined systolic and diastolic CHF (congestive heart failure) (HCC)  Wt Readings from Last 3 Encounters:   01/13/25 89.2 kg (196 lb 10.4 oz)   12/26/24 100 kg (220 lb 14.4 oz)   12/13/24 103 kg (227 lb)   Home diuretic: PO Lasix 40mg QD -Lasix on hold due to poor p.o. intake  Continue metoprolol, losartan, Jardiance  Low-salt diet  Monitor I/O, daily weights-net fluid balance and weight is down    Prostate cancer (HCC)  History of prostate cancer follows with oncology receiving chemotherapy (last on 12/12)  Follows with Dr. Hernandez  Current regimen is Cabazitaxel, Neulasta, prednisone, Lupron, denosumab  Continue  "to follow in outpatient setting  On prednisone 5 mg twice daily-no postoperative-potential and requiring stress dose of steroids  Diarrhea  Patient with episodes of diarrhea since receiving chemotherapy last week.  He also reports some nausea and vomiting.  Poor PO intake  Likely chemotherapy associated.  Improving  C. difficile and stool enteric panel negative  Supportive cares  Replete electrolytes  Imodium as needed  Patient still with persistent diarrhea and also having some nausea and vomiting.  Vomiting appears to be posttussive.  Chest x-ray reviewed.   CT abdomen and pelvis ordered-CT scan reviewed.-Nonspecific findings underlying gastritis or colitis   Stool enteric panel is negative.  C. difficile negative.-Continue with symptomatic care.  If the abdominal discomfort is persistent will obtain GI evaluation  Vomiting appears to be posttussive in nature-no further vomit but still continued to have epigastric pain and nauseousness.  Will consult GI due to persistent symptoms.  Already on PPI twice daily and GI cocktail.  Add Carafate  CAD (coronary artery disease)  Continue aspirin, metoprolol, atorvastatin.  Patient denies abdominal pain  Urinary retention  Had to be straight cathed x 1  Started on Flomax and Finasteride with improvement in symptoms  Has been able to urinate on his own  Patient has Moser catheter in.  Likely voiding trial in the rehab  Ulcer of left foot, limited to breakdown of skin (HCC)  Pic under media.  Low suspicion for active infection  Per podiatry, no surgical plans  Status post angiogram with the intervention previously  Continue with local wound care      Hyponatremia  Recent Labs     01/12/25  0431 01/13/25  1301   SODIUM 140 139     No results found for: \"OSMOUA\", \"NAUR\", \"OSMOLALITSER\"    Resolved    Iron deficiency anemia  Lab Results   Component Value Date    HGB 9.7 (L) 01/13/2025    HGB 7.3 (L) 01/12/2025    HGB 7.5 (L) 01/10/2025    HGB 8.1 (L) 01/09/2025    HGB 8.4 (L) " 01/08/2025    CONCFE 10 (L) 12/28/2024    IRON 21 (L) 12/28/2024    FERRITIN 1,012 (H) 12/28/2024    TIBC 214.2 (L) 12/28/2024     Continue iron supplementation  Status post 1 unit of packed RBCs on Sunday.  Hemoglobin 9.7  -  Ischemic cardiomyopathy    Mixed hyperlipidemia    History of transcatheter aortic valve replacement (TAVR)    Encounter for preoperative assessment for noncoronary cardiac surgery      VTE Pharmacologic Prophylaxis: VTE Score: 6 Moderate Risk (Score 3-4) - Pharmacological DVT Prophylaxis Ordered: apixaban (Eliquis).    Mobility:   Basic Mobility Inpatient Raw Score: 15  -[facility] Goal: 4: Move to chair/commode  JH-HLM Achieved: 2: Bed activities/Dependent transfer  -HLM Goal achieved. Continue to encourage appropriate mobility.    Patient Centered Rounds: I performed bedside rounds with nursing staff today.   Discussions with Specialists or Other Care Team Provider:     Education and Discussions with Family / Patient:-Called son  and left voicemail  Current Length of Stay: 12 day(s)  Current Patient Status: Inpatient   Certification Statement: The patient will continue to require additional inpatient hospital stay due to pending safe discharge planning  Discharge Plan: Anticipate discharge in 48-72 hrs to rehab facility.    Code Status: Level 1 - Full Code    Subjective   Patient seen and examined.  Patient reported that he does not feel well.  Unable to tell me exactly what is going on.  Still with nausea and epigastric discomfort.  Diarrhea is significantly better.  Due to persistent GI symptoms will get GI to evaluate the patient.  Previous CAT scan reviewed.  Also complaining of pain at the surgical site.  By reviewing the medication history he is not taking all available PRNs either.    Objective :  Temp:  [98 °F (36.7 °C)-98.3 °F (36.8 °C)] 98 °F (36.7 °C)  HR:  [80-93] 86  BP: (100-142)/(44-92) 100/44  Resp:  [17-21] 17  SpO2:  [94 %-96 %] 94 %    Body mass index is 29.9 kg/m².      Input and Output Summary (last 24 hours):     Intake/Output Summary (Last 24 hours) at 1/13/2025 2102  Last data filed at 1/13/2025 1701  Gross per 24 hour   Intake 2200 ml   Output 2450 ml   Net -250 ml       Physical Exam  Constitutional:       General: He is not in acute distress.     Appearance: He is not ill-appearing.   HENT:      Head: Normocephalic and atraumatic.      Nose: No congestion.   Eyes:      General: No scleral icterus.  Cardiovascular:      Rate and Rhythm: Normal rate and regular rhythm.      Heart sounds: No murmur heard.  Pulmonary:      Effort: Pulmonary effort is normal.   Abdominal:      General: There is no distension.      Tenderness: There is no abdominal tenderness.   Musculoskeletal:      Comments: Right BKA site covered in dressing.  Left toe dry gangrene covered in dressing   Neurological:      Mental Status: He is alert.           Lines/Drains:  Lines/Drains/Airways       Active Status       Name Placement date Placement time Site Days    Urethral Catheter Straight-tip;Non-latex 16 Fr. 01/02/25  1611  Straight-tip;Non-latex  11                  Urinary Catheter:  Goal for removal: Voiding trial when ambulation improves         Central Line:  Goal for removal: Port accessed. Will de-access as appropriate.               Lab Results: I have reviewed the following results:   Results from last 7 days   Lab Units 01/13/25  1301 01/10/25  0403 01/09/25  0431   WBC Thousand/uL 9.13   < > 14.00*   HEMOGLOBIN g/dL 9.7*   < > 8.1*   HEMATOCRIT % 31.1*   < > 27.2*   PLATELETS Thousands/uL 448*   < > 488*   SEGS PCT %  --   --  85*   LYMPHO PCT %  --   --  6*   MONO PCT %  --   --  6   EOS PCT %  --   --  1    < > = values in this interval not displayed.     Results from last 7 days   Lab Units 01/13/25  1301 01/12/25  0431   SODIUM mmol/L 139 140   POTASSIUM mmol/L 3.5 3.6   CHLORIDE mmol/L 106 110*   CO2 mmol/L 22 22   BUN mg/dL 13 9   CREATININE mg/dL 0.68 0.68   ANION GAP mmol/L 11 8    CALCIUM mg/dL 7.9* 8.1*   ALBUMIN g/dL  --  2.9*   TOTAL BILIRUBIN mg/dL  --  0.40   ALK PHOS U/L  --  88   ALT U/L  --  27   AST U/L  --  17   GLUCOSE RANDOM mg/dL 87 87                       Recent Cultures (last 7 days):   Results from last 7 days   Lab Units 01/11/25  1827   C DIFF TOXIN B BY PCR  Negative             Last 24 Hours Medication List:     Current Facility-Administered Medications:     acetaminophen (TYLENOL) tablet 975 mg, Q8H DEWAYNE    allopurinol (ZYLOPRIM) tablet 100 mg, Daily    aspirin chewable tablet 81 mg, Daily    atorvastatin (LIPITOR) tablet 80 mg, Daily With Dinner    bismuth subsalicylate (PEPTO BISMOL) oral suspension 524 mg, Q6H PRN    clopidogrel (PLAVIX) tablet 75 mg, Daily    Diclofenac Sodium (VOLTAREN) 1 % topical gel 2 g, 4x Daily    Empagliflozin (JARDIANCE) tablet 10 mg, QAM    enoxaparin (LOVENOX) subcutaneous injection 40 mg, Daily    ezetimibe (ZETIA) tablet 10 mg, Daily    famotidine (PEPCID) tablet 20 mg, Daily    ferrous sulfate tablet 325 mg, Daily    finasteride (PROSCAR) tablet 5 mg, Daily    [Held by provider] furosemide (LASIX) tablet 40 mg, Daily    gabapentin (NEURONTIN) capsule 600 mg, BID    guaiFENesin (MUCINEX) 12 hr tablet 600 mg, Q12H DEWAYNE    HYDROmorphone HCl (DILAUDID) injection 0.2 mg, Q4H PRN    loperamide (IMODIUM) capsule 2 mg, 4x Daily PRN    [Held by provider] losartan (COZAAR) tablet 12.5 mg, Daily    menthol-methyl salicylate (BENGAY) 10-15 % cream, 4x Daily PRN    methocarbamol (ROBAXIN) tablet 500 mg, TID    metoprolol succinate (TOPROL-XL) 24 hr tablet 25 mg, Daily    naloxone (NARCAN) 0.04 mg/mL syringe 0.04 mg, Q1MIN PRN    ondansetron (ZOFRAN) injection 4 mg, Q6H PRN    oxyCODONE (ROXICODONE) IR tablet 5 mg, Q4H PRN **OR** oxyCODONE (ROXICODONE) immediate release tablet 10 mg, Q4H PRN    pantoprazole (PROTONIX) EC tablet 40 mg, BID AC    pentoxifylline (TRENtal) ER tablet 400 mg, TID With Meals    potassium chloride (Klor-Con M20) CR tablet 20  mEq, Daily    predniSONE tablet 5 mg, BID With Meals    senna (SENOKOT) tablet 17.2 mg, HS    simethicone (MYLICON) chewable tablet 80 mg, Q6H PRN    sucralfate (CARAFATE) tablet 1 g, 4x Daily (AC & HS)    tamsulosin (FLOMAX) capsule 0.4 mg, Daily With Dinner    venlafaxine (EFFEXOR) tablet 25 mg, Daily    Administrative Statements   Today, Patient Was Seen By: Kelli Pennington MD      **Please Note: This note may have been constructed using a voice recognition system.**

## 2025-01-14 NOTE — ASSESSMENT & PLAN NOTE
Baseline- close to wheelchair bounded- minimal walk.  Fall precautions  Likely need rehab postprocedure.  PMR consult appreciated  Needs rehab

## 2025-01-14 NOTE — ASSESSMENT & PLAN NOTE
Lab Results   Component Value Date    HGB 9.7 (L) 01/13/2025    HGB 7.3 (L) 01/12/2025    HGB 7.5 (L) 01/10/2025    HGB 8.1 (L) 01/09/2025    HGB 8.4 (L) 01/08/2025    CONCFE 10 (L) 12/28/2024    IRON 21 (L) 12/28/2024    FERRITIN 1,012 (H) 12/28/2024    TIBC 214.2 (L) 12/28/2024     Continue iron supplementation  Status post 1 unit of packed RBCs on Sunday.  Hemoglobin 9.7  -

## 2025-01-14 NOTE — ASSESSMENT & PLAN NOTE
Patient was recently admitted to [facility] for failure to thrive and generalized weakness following chemotherapy.  Patient was pending placement to rehab facility when he was noted to have gangrene on the right foot and poor healing wound on the left.  Case was discussed with vascular surgery who recommended transfer to [place].    Patient was seen by both podiatry and vascular surgery, CT of the left lower extremity showed severe disease.  No revascularization options available, recommended right AKA which was done 1/8.  Local wound care of right stump per vascular surgery  Patient has stable wounds on the left without clinical signs of infection.  Continue local wound care per podiatry on the left  Weightbearing as tolerated  Continue aspirin, Plavix, statin, Trental.   Acute pain has been following, signing off 1/14.  Continue ATC APAP, Robaxin, gabapentin as needed oxycodone with IV Dilaudid for breakthrough.  PT/OT recommending rehab, case management following

## 2025-01-14 NOTE — OCCUPATIONAL THERAPY NOTE
Occupational Therapy Progress Note     Patient Name: Royce Rene  Today's Date: 1/14/2025  Problem List  Principal Problem:    PAD (peripheral artery disease) (HCC)  Active Problems:    Chronic combined systolic and diastolic CHF (congestive heart failure) (HCC)    Ischemic cardiomyopathy    Prostate cancer (HCC)    CAD (coronary artery disease)    Urinary retention    Ambulatory dysfunction    Ulcer of left foot, limited to breakdown of skin (HCC)    Mixed hyperlipidemia    Diarrhea    Iron deficiency anemia    History of transcatheter aortic valve replacement (TAVR)    Encounter for preoperative assessment for noncoronary cardiac surgery    Severe protein-calorie malnutrition (HCC)           01/14/25 1146   OT Last Visit   OT Visit Date 01/14/25   Note Type   Note Type Treatment   Pain Assessment   Pain Assessment Tool 0-10   Pain Score No Pain   Restrictions/Precautions   Weight Bearing Precautions Per Order Yes   RLE Weight Bearing Per Order NWB   LLE Weight Bearing Per Order WBAT   Other Precautions Chair Alarm;Cognitive;Bed Alarm;Multiple lines;Telemetry;Fall Risk  (s/p R AKA)   ADL   Toileting Assistance  1  Total Assistance   Toileting Deficit Perineal hygiene   Bed Mobility   Rolling R 3  Moderate assistance   Additional items Assist x 1;Increased time required;Verbal cues;LE management   Rolling L 3  Moderate assistance   Additional items Assist x 1;Increased time required;Verbal cues;LE management   Additional Comments pt rolled to R and L for perienal hygiene, refused to sit eob for perineal hygiene.   Cognition   Overall Cognitive Status Impaired   Arousal/Participation Alert;Responsive   Attention Attends with cues to redirect   Orientation Level Oriented X4   Memory Decreased short term memory;Decreased recall of recent events;Decreased recall of precautions   Following Commands Follows one step commands with increased time or repetition   Comments pt requires encouragement to complete tasks, has  overall poor safety awareness and insight to condition.   Activity Tolerance   Activity Tolerance Patient limited by fatigue   Medical Staff Made Aware DPT 2' pts med complexity, comorbities and regression from baseline.   Assessment   Assessment Pt seen on this date for OT session focusing on ADL retraining, cognitive reorientation,  increasing activity tolerance/endurance to increase ability to participate in ADL/functional tasks. Pt was found in bed and was left in bed w/ all needs within reach, bed alarm on. Pt rolled to R and L w mod ax1 for perineal hygiene, which he required total a to complete. Pt unable to perform additional bed mob 2' nausea and diarrhea, was refusing any further intervention.  Pt w/ improvements in activity tolerance, transfer ability, endurance, however is still limited 2* decreased ADL/High-level ADL status, decreased activity tolerance/endurance, decreased cognition, decreased self-care trans, decreased safety awareness and insight to condition.   The patient's raw score on the AM-PAC Daily Activity Inpatient Short Form is 14. A raw score of less than 19 suggests the patient may benefit from discharge to post-acute rehabilitation services. Please refer to the recommendation of the Occupational Therapist for safe discharge planning.   Recommending pt D/C to level 2 when medically stable. Pt will continue to benefit from acute OT services to meet goals.   Plan   Treatment Interventions ADL retraining;Functional transfer training;Endurance training;Equipment evaluation/education;Patient/family training;Activityengagement;Energy conservation;Cognitive reorientation   Goal Expiration Date 01/17/25   OT Treatment Day 2   OT Frequency 2-3x/wk   Discharge Recommendation   Rehab Resource Intensity Level, OT I (Maximum Resource Intensity)   AM-PAC Daily Activity Inpatient   Lower Body Dressing 2   Bathing 2   Toileting 2   Upper Body Dressing 2   Grooming 3   Eating 3   Daily Activity Raw Score  14   Daily Activity Standardized Score (Calc for Raw Score >=11) 33.39   AM-PAC Applied Cognition Inpatient   Following a Speech/Presentation 4   Understanding Ordinary Conversation 4   Taking Medications 3   Remembering Where Things Are Placed or Put Away 3   Remembering List of 4-5 Errands 3   Taking Care of Complicated Tasks 3   Applied Cognition Raw Score 20   Applied Cognition Standardized Score 41.76   Modified [place] Scale   Modified [place] Scale 4   End of Consult   Education Provided Yes   Patient Position at End of Consult Supine;Bed/Chair alarm activated;All needs within reach   Nurse Communication Nurse aware of consult         BELKIS Li, OTR/L

## 2025-01-14 NOTE — ASSESSMENT & PLAN NOTE
"Recent Labs     01/12/25  0431 01/13/25  1301   SODIUM 140 139     No results found for: \"OSMOUA\", \"NAUR\", \"OSMOLALITSER\"    Resolved    "

## 2025-01-14 NOTE — ASSESSMENT & PLAN NOTE
Lab Results   Component Value Date    HGB 9.7 (L) 01/13/2025    HGB 7.3 (L) 01/12/2025    HGB 7.5 (L) 01/10/2025    HGB 8.1 (L) 01/09/2025    HGB 8.4 (L) 01/08/2025    CONCFE 10 (L) 12/28/2024    IRON 21 (L) 12/28/2024    FERRITIN 1,012 (H) 12/28/2024    TIBC 214.2 (L) 12/28/2024     Continue iron supplementation  Status post 1 unit PRBC 1/12  Monitor hemoglobin, additional transfusions as needed

## 2025-01-14 NOTE — ASSESSMENT & PLAN NOTE
Patient with diarrhea since receiving last round of chemotherapy in December 2024.  Also with intermittent nausea/vomiting and overall poor oral intake.    C. difficile, bacterial stool panel negative   Given lack of improvement despite Imodium, GI was consulted.  Additional stool studies with fecal fat, Giardia and stool sodium ordered and pending  If no spontaneous resolution/improvement over the next few weeks, can consider colonoscopy as outpatient.   Will continue loperamide PRN  Added Banatrol flakes BID 1/14

## 2025-01-14 NOTE — ASSESSMENT & PLAN NOTE
Wt Readings from Last 3 Encounters:   01/14/25 89.2 kg (196 lb 10.4 oz)   12/26/24 100 kg (220 lb 14.4 oz)   12/13/24 103 kg (227 lb)   Home diuretic: PO Lasix 40mg QD.   Lasix on hold due to poor p.o. intake  Continue metoprolol, Jardiance.  Losartan has also been held  Low-salt diet  Monitor I/O, daily weights-net fluid balance and weight is down

## 2025-01-14 NOTE — PHYSICAL THERAPY NOTE
PHYSICAL THERAPY NOTE          Patient Name: Royce Rene  Today's Date: 1/14/2025 01/14/25 1145   PT Last Visit   PT Visit Date 01/14/25   Note Type   Note Type Cancelled Session   Cancel Reasons Refusal       PT attempt to see patient at stated time. Patient declining PT services at this time due to fatigue, nausea, and diarrhea. Patient assisted in hygiene, but declines further mobility.  PT will continue to follow as able.    Nadine Gloria, PT, DPT

## 2025-01-14 NOTE — ASSESSMENT & PLAN NOTE
Malnutrition Findings:   Adult Malnutrition type: Acute illness  Adult Degree of Malnutrition: Other severe protein calorie malnutrition  Malnutrition Characteristics: Inadequate energy, Weight loss                  360 Statement: related to catabolic illness, inadequate energy intake due to nausea, diarrhea, as evidenced by, 10% weight loss last months, intake < 50% estimated energy requirements for > 5 days, dark sunken orbital. Treatment: Regular diet, oral nutrition supplements.    BMI Findings:           Body mass index is 29.9 kg/m².

## 2025-01-14 NOTE — ASSESSMENT & PLAN NOTE
Patient with episodes of diarrhea since receiving chemotherapy last week.  He also reports some nausea and vomiting.  Poor PO intake  Likely chemotherapy associated.  Improving  C. difficile and stool enteric panel negative  Supportive cares  Replete electrolytes  Imodium as needed  Patient still with persistent diarrhea and also having some nausea and vomiting.  Vomiting appears to be posttussive.  Chest x-ray reviewed.   CT abdomen and pelvis ordered-CT scan reviewed.-Nonspecific findings underlying gastritis or colitis   Stool enteric panel is negative.  C. difficile negative.-Continue with symptomatic care.  If the abdominal discomfort is persistent will obtain GI evaluation  Vomiting appears to be posttussive in nature-no further vomit but still continued to have epigastric pain and nauseousness.  Will consult GI due to persistent symptoms.  Already on PPI twice daily and GI cocktail.  Add Carafate

## 2025-01-14 NOTE — PROGRESS NOTES
Progress Note - Acute Pain   Name: Royce Rene 79 y.o. male I MRN: [id number]  Unit/Bed#: [facility] 523-01 I Date of Admission: 1/1/2025   Date of Service: 1/14/2025 I Hospital Day: 13    Assessment & Plan  PAD (peripheral artery disease) (HCC)  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    Received r sided single shot femoral and intragluteal sciatic blocks with exparel 1/8  Discussed given POD#6 and blocks only temporizing measure (18-36hr), procedural intervention not indicated    Multimodal Analgesia:  Tylenol 975 mg every 8 hours scheduled  Consider lidocaine 5% patch on front and back of thigh, may help with thigh pain/spasm    Gabapentin 600 mg 2 times daily, home medication  Consider modifying to 600mg/600mg/300mg instead of 600mg BID  Monitor and discontinue for oversedation  May consider Palliative consult as they manage these medications outpatient    Robaxin 500 mg every 8 hours, home medication  consider increasing to 750mg PO q8h for improved spasm pain  Monitor and discontinue for oversedation  May consider Palliative consult as they manage these medications outpatient    Venlafaxine 25 mg daily as second line agent for neuropathic pain  Defer further NSAIDs given already on ASA 81mg daily    Oxycodone 5 mg every 4 hours as needed for moderate pain  Oxycodone 10 mg every 4 hours as needed for severe pain  IV dilaudid 0.2 mg every 4 hours as needed for breakthrough pain  Narcan as needed for respiratory depression/opioid reversal     Prostate cancer (HCC)  Patient follows with palliative care outpatient for symptom management of metastatic prostate cancer.    Outpatient analgesic regimen includes:  Acetaminophen 1000 mg 3 times daily as needed  Gabapentin 600 mg 2 times daily  Methocarbamol 500 mg 3 times daily  Oxycodone 5 mg 2 times daily as needed      APS will sign off at this time. Thank you for the consult. All opioids and other analgesics to be  written at discretion of primary team. Please contact Acute Pain Service - via SecureChat from [phone] with additional questions or concerns. See SecureChat or [employer] for additional contacts and after hours information.    Subjective   Royce Rene is a 79 y.o. male with significant past medical history including PAD with worsening right foot wound and gangrene. He is s/p right AKA 1/8/2025. APS was consulted for preoperative evaluation and postoperative pain control.  APS signed off patient on (01/09/24) however re-engaged for further pain control and assessment for possible pain intervention and/or other medications/strategies to improve analgesia.    Today, patient was resting in bed napping when I arrived to evaluate him.  He reports pain is mainly in RLE and most bothersome this morning is the tightness/spasm pain.  I discussed that while we offer nerve blocks in perioperative situations, however given he is POD #6 I would not offer temporizing measure such as block as this would wear off and we would be back in a similar position, possibly even with increased pain due to step-up phenomena.  Looking through the MAR appears oxycodone being used appropriately (2-3 daily). Discussed modification of muscle relaxant may be an option given his current symptoms, patient was understanding and in agreement. Unfortunately, we don't have many alternative options at this point other than slow titration of medications with vigilance for oversedation/mental fogging in the setting of polypharmacy.     Pain History  Current pain location(s):  Pain Score: 8  Pain Location/Orientation: Orientation: Right, Location: Leg  Pain Scale: Pain Assessment Tool: 0-10  24 hour history: see assessment    Opioid requirement previous 24 hours: oxycodone 10mg PO x4 doses, dilaudid 0.2mg IV x4 doses    Meds/Allergies   all current active meds have been reviewed and current meds:   Current Facility-Administered Medications:     acetaminophen  (TYLENOL) tablet 975 mg, Q8H DEWAYNE    allopurinol (ZYLOPRIM) tablet 100 mg, Daily    aspirin chewable tablet 81 mg, Daily    atorvastatin (LIPITOR) tablet 80 mg, Daily With Dinner    bismuth subsalicylate (PEPTO BISMOL) oral suspension 524 mg, Q6H PRN    clopidogrel (PLAVIX) tablet 75 mg, Daily    Diclofenac Sodium (VOLTAREN) 1 % topical gel 2 g, 4x Daily    Empagliflozin (JARDIANCE) tablet 10 mg, QAM    enoxaparin (LOVENOX) subcutaneous injection 40 mg, Daily    ezetimibe (ZETIA) tablet 10 mg, Daily    famotidine (PEPCID) tablet 20 mg, Daily    ferrous sulfate tablet 325 mg, Daily    finasteride (PROSCAR) tablet 5 mg, Daily    [Held by provider] furosemide (LASIX) tablet 40 mg, Daily    gabapentin (NEURONTIN) capsule 600 mg, BID    guaiFENesin (MUCINEX) 12 hr tablet 600 mg, Q12H DEWAYNE    HYDROmorphone HCl (DILAUDID) injection 0.2 mg, Q4H PRN    loperamide (IMODIUM) capsule 2 mg, 4x Daily PRN    [Held by provider] losartan (COZAAR) tablet 12.5 mg, Daily    menthol-methyl salicylate (BENGAY) 10-15 % cream, 4x Daily PRN    methocarbamol (ROBAXIN) tablet 500 mg, TID    metoprolol succinate (TOPROL-XL) 24 hr tablet 25 mg, Daily    naloxone (NARCAN) 0.04 mg/mL syringe 0.04 mg, Q1MIN PRN    ondansetron (ZOFRAN) injection 4 mg, Q6H PRN    oxyCODONE (ROXICODONE) IR tablet 5 mg, Q4H PRN **OR** oxyCODONE (ROXICODONE) immediate release tablet 10 mg, Q4H PRN    pantoprazole (PROTONIX) EC tablet 40 mg, BID AC    pentoxifylline (TRENtal) ER tablet 400 mg, TID With Meals    potassium chloride (Klor-Con M20) CR tablet 20 mEq, Daily    predniSONE tablet 5 mg, BID With Meals    senna (SENOKOT) tablet 17.2 mg, HS    simethicone (MYLICON) chewable tablet 80 mg, Q6H PRN    sucralfate (CARAFATE) tablet 1 g, 4x Daily (AC & HS)    tamsulosin (FLOMAX) capsule 0.4 mg, Daily With Dinner    venlafaxine (EFFEXOR) tablet 25 mg, Daily  No Known Allergies  Objective :  Temp:  [98 °F (36.7 °C)-98.2 °F (36.8 °C)] 98 °F (36.7 °C)  HR:  [86-97] 97  BP:  (100-141)/(44-71) 141/69  Resp:  [17-18] 18  SpO2:  [93 %-96 %] 96 %  O2 Device: None (Room air)    Physical Exam  Vitals and nursing note reviewed.   Constitutional:       General: He is not in acute distress.     Appearance: He is not toxic-appearing or diaphoretic.   HENT:      Head: Normocephalic and atraumatic.      Nose: Nose normal.      Mouth/Throat:      Mouth: Mucous membranes are moist.      Pharynx: Oropharynx is clear.   Eyes:      General: No scleral icterus.     Conjunctiva/sclera: Conjunctivae normal.   Cardiovascular:      Rate and Rhythm: Normal rate and regular rhythm.      Pulses: Normal pulses.      Heart sounds: Normal heart sounds.   Pulmonary:      Effort: Pulmonary effort is normal. No respiratory distress.      Breath sounds: Normal breath sounds. No wheezing.   Abdominal:      General: Abdomen is flat. There is no distension.      Palpations: Abdomen is soft.      Tenderness: There is no abdominal tenderness.   Genitourinary:     Comments: Moser in place  Musculoskeletal:         General: Tenderness present.      Comments: S/p right AKA   Skin:     General: Skin is warm and dry.   Neurological:      Mental Status: He is alert.            Lab Results: I have reviewed the following results:  Estimated Creatinine Clearance: 95.6 mL/min (by C-G formula based on SCr of 0.68 mg/dL).  Lab Results   Component Value Date    WBC 9.13 01/13/2025    HGB 9.7 (L) 01/13/2025    HCT 31.1 (L) 01/13/2025     (H) 01/13/2025         Component Value Date/Time    K 3.5 01/13/2025 1301     01/13/2025 1301    CO2 22 01/13/2025 1301    BUN 13 01/13/2025 1301    CREATININE 0.68 01/13/2025 1301         Component Value Date/Time    CALCIUM 7.9 (L) 01/13/2025 1301    ALKPHOS 88 01/12/2025 0431    AST 17 01/12/2025 0431    ALT 27 01/12/2025 0431    TP 5.7 (L) 01/12/2025 0431    ALB 2.9 (L) 01/12/2025 0431

## 2025-01-14 NOTE — MALNUTRITION/BMI
This medical record reflects one or more clinical indicators suggestive of malnutrition.    Malnutrition Findings:   Adult Malnutrition type: Acute illness  Adult Degree of Malnutrition: Other severe protein calorie malnutrition  Malnutrition Characteristics: Inadequate energy, Weight loss                360 Statement: related to catabolic illness, inadequate energy intake due to nausea, diarrhea, as evidenced by, 10% weight loss last months, intake < 50% estimated energy requirements for > 5 days, dark sunken orbital. Treatment: Regular diet, oral nutrition supplements.    BMI Findings:           Body mass index is 29.9 kg/m².     See Nutrition note dated 1/14/2025 for additional details.  Completed nutrition assessment is viewable in the nutrition documentation.

## 2025-01-14 NOTE — PROGRESS NOTES
Progress Note - Hospitalist   Name: Royce Rene 79 y.o. male I MRN: [id number]  [facility] I Date of Admission: 1/1/2025   Date of Service: 1/14/2025 I Hospital Day: 13     Assessment & Plan  PAD (peripheral artery disease) (HCC)  Patient was recently admitted to [facility] for failure to thrive and generalized weakness following chemotherapy.  Patient was pending placement to rehab facility when he was noted to have gangrene on the right foot and poor healing wound on the left.  Case was discussed with vascular surgery who recommended transfer to [place].    Patient was seen by both podiatry and vascular surgery, CT of the left lower extremity showed severe disease.  No revascularization options available, recommended right AKA which was done 1/8.  Local wound care of right stump per vascular surgery  Patient has stable wounds on the left without clinical signs of infection.  Continue local wound care per podiatry on the left  Weightbearing as tolerated  Continue aspirin, Plavix, statin, Trental.   Acute pain has been following, signing off 1/14.  Continue ATC APAP, Robaxin, gabapentin as needed oxycodone with IV Dilaudid for breakthrough.  PT/OT recommending rehab, case management following  Chronic combined systolic and diastolic CHF (congestive heart failure) (HCC)  Wt Readings from Last 3 Encounters:   01/14/25 89.2 kg (196 lb 10.4 oz)   12/26/24 100 kg (220 lb 14.4 oz)   12/13/24 103 kg (227 lb)   Home diuretic: PO Lasix 40mg QD.   Lasix on hold due to poor p.o. intake  Continue metoprolol, Jardiance.  Losartan has also been held  Low-salt diet  Monitor I/O, daily weights-net fluid balance and weight is down    Prostate cancer (HCC)  History of prostate cancer follows with oncology receiving chemotherapy (last on 12/12)  Follows with Dr. Hernandez  Current regimen is Cabazitaxel, Neulasta, prednisone, Lupron, denosumab  Continue to follow in outpatient setting    Diarrhea  Patient with diarrhea  "since receiving last round of chemotherapy in December 2024.  Also with intermittent nausea/vomiting and overall poor oral intake.    C. difficile, bacterial stool panel negative   Given lack of improvement despite Imodium, GI was consulted.  Additional stool studies with fecal fat, Giardia and stool sodium ordered and pending  If no spontaneous resolution/improvement over the next few weeks, can consider colonoscopy as outpatient.   Will continue loperamide PRN  Added Banatrol flakes BID 1/14  CAD (coronary artery disease)  Continue aspirin, Plavix, beta-blocker, statin  Urinary retention  Moser catheter placed 1/2  Continue Proscar/Flomax  Void trial at rehab when more mobile  Hyponatremia (Resolved: 1/14/2025)  Recent Labs     01/12/25  0431 01/13/25  1301   SODIUM 140 139     No results found for: \"OSMOUA\", \"NAUR\", \"OSMOLALITSER\"    Resolved    Iron deficiency anemia  Lab Results   Component Value Date    HGB 9.7 (L) 01/13/2025    HGB 7.3 (L) 01/12/2025    HGB 7.5 (L) 01/10/2025    HGB 8.1 (L) 01/09/2025    HGB 8.4 (L) 01/08/2025    CONCFE 10 (L) 12/28/2024    IRON 21 (L) 12/28/2024    FERRITIN 1,012 (H) 12/28/2024    TIBC 214.2 (L) 12/28/2024     Continue iron supplementation  Status post 1 unit PRBC 1/12  Monitor hemoglobin, additional transfusions as needed  Mixed hyperlipidemia  Continue statin  History of transcatheter aortic valve replacement (TAVR)  Noted  Severe protein-calorie malnutrition (HCC)  Malnutrition Findings:   Adult Malnutrition type: Acute illness  Adult Degree of Malnutrition: Other severe protein calorie malnutrition  Malnutrition Characteristics: Inadequate energy, Weight loss                  360 Statement: related to catabolic illness, inadequate energy intake due to nausea, diarrhea, as evidenced by, 10% weight loss last months, intake < 50% estimated energy requirements for > 5 days, dark sunken orbital. Treatment: Regular diet, oral nutrition supplements.    BMI Findings:         "   Body mass index is 29.9 kg/m².       VTE Pharmacologic Prophylaxis: VTE Score: 6 High Risk (Score >/= 5) - Pharmacological DVT Prophylaxis Ordered: enoxaparin (Lovenox). Sequential Compression Devices Ordered.    Mobility:   Basic Mobility Inpatient Raw Score: 15  -HL Goal: 4: Move to chair/commode  JH-HLM Achieved: 2: Bed activities/Dependent transfer  JH-HLM Goal NOT achieved. Continue with multidisciplinary rounding and encourage appropriate mobility to improve upon -HLM goals.    Patient Centered Rounds: I performed bedside rounds with nursing staff today.   Discussions with Specialists or Other Care Team Provider: nursing, case management     Education and Discussions with Family / Patient: Patient declined call to .     Current Length of Stay: 13 day(s)  Current Patient Status: Inpatient   Certification Statement: The patient will continue to require additional inpatient hospital stay due to poor oral intake, diarrhea pending stool studies, placement   Discharge Plan: Anticipate discharge in 48 hrs to rehab facility.    Code Status: Level 1 - Full Code    Subjective   Continues to complain of diarrhea. No abdominal pain. Some intermittent nausea but no vomiting. Agreeable for rehab. Encouraged oral intake with patient, he states he will try.     Objective :  Temp:  [98 °F (36.7 °C)-98.2 °F (36.8 °C)] 98 °F (36.7 °C)  HR:  [86-97] 97  BP: (100-141)/(44-71) 141/69  Resp:  [17-18] 18  SpO2:  [93 %-96 %] 96 %  O2 Device: None (Room air)    Body mass index is 29.9 kg/m².     Input and Output Summary (last 24 hours):     Intake/Output Summary (Last 24 hours) at 1/14/2025 1242  Last data filed at 1/14/2025 0619  Gross per 24 hour   Intake 960 ml   Output 900 ml   Net 60 ml       Physical Exam  Vitals and nursing note reviewed.   Constitutional:       General: He is not in acute distress.     Appearance: He is obese.   Cardiovascular:      Rate and Rhythm: Normal rate.   Pulmonary:      Breath  sounds: Decreased breath sounds present.   Abdominal:      General: Bowel sounds are normal.      Palpations: Abdomen is soft.      Tenderness: There is no abdominal tenderness.   Genitourinary:     Comments: Moser cath with clear yellow urine  Musculoskeletal:      Comments: Right AKA with stump dressing CDI, left foot dressing CDI   Skin:     Coloration: Skin is pale.   Neurological:      Mental Status: He is alert and oriented to person, place, and time. Mental status is at baseline.   Psychiatric:         Mood and Affect: Mood normal.           Lines/Drains:  Lines/Drains/Airways       Active Status       Name Placement date Placement time Site Days    Urethral Catheter Straight-tip;Non-latex 16 Fr. 01/02/25  1611  Straight-tip;Non-latex  11                  Urinary Catheter:  Goal for removal: N/A- Discharging with Moser         Central Line:  Goal for removal: Will discontinue when peripheral access obtained.                Lab Results: I have reviewed the following results:   Results from last 7 days   Lab Units 01/13/25  1301 01/10/25  0403 01/09/25  0431   WBC Thousand/uL 9.13   < > 14.00*   HEMOGLOBIN g/dL 9.7*   < > 8.1*   HEMATOCRIT % 31.1*   < > 27.2*   PLATELETS Thousands/uL 448*   < > 488*   SEGS PCT %  --   --  85*   LYMPHO PCT %  --   --  6*   MONO PCT %  --   --  6   EOS PCT %  --   --  1    < > = values in this interval not displayed.     Results from last 7 days   Lab Units 01/13/25  1301 01/12/25  0431   SODIUM mmol/L 139 140   POTASSIUM mmol/L 3.5 3.6   CHLORIDE mmol/L 106 110*   CO2 mmol/L 22 22   BUN mg/dL 13 9   CREATININE mg/dL 0.68 0.68   ANION GAP mmol/L 11 8   CALCIUM mg/dL 7.9* 8.1*   ALBUMIN g/dL  --  2.9*   TOTAL BILIRUBIN mg/dL  --  0.40   ALK PHOS U/L  --  88   ALT U/L  --  27   AST U/L  --  17   GLUCOSE RANDOM mg/dL 87 87                       Recent Cultures (last 7 days):   Results from last 7 days   Lab Units 01/11/25  1827   C DIFF TOXIN B BY PCR  Negative       Imaging Results  Review: No pertinent imaging studies reviewed.  Other Study Results Review: No additional pertinent studies reviewed.    Last 24 Hours Medication List:     Current Facility-Administered Medications:     acetaminophen (TYLENOL) tablet 975 mg, Q8H DEWAYNE    allopurinol (ZYLOPRIM) tablet 100 mg, Daily    aspirin chewable tablet 81 mg, Daily    atorvastatin (LIPITOR) tablet 80 mg, Daily With Dinner    bismuth subsalicylate (PEPTO BISMOL) oral suspension 524 mg, Q6H PRN    clopidogrel (PLAVIX) tablet 75 mg, Daily    Diclofenac Sodium (VOLTAREN) 1 % topical gel 2 g, 4x Daily    Empagliflozin (JARDIANCE) tablet 10 mg, QAM    enoxaparin (LOVENOX) subcutaneous injection 40 mg, Daily    ezetimibe (ZETIA) tablet 10 mg, Daily    famotidine (PEPCID) tablet 20 mg, Daily    ferrous sulfate tablet 325 mg, Daily    finasteride (PROSCAR) tablet 5 mg, Daily    [Held by provider] furosemide (LASIX) tablet 40 mg, Daily    gabapentin (NEURONTIN) capsule 600 mg, BID    guaiFENesin (MUCINEX) 12 hr tablet 600 mg, Q12H DEWAYNE    HYDROmorphone HCl (DILAUDID) injection 0.2 mg, Q4H PRN    loperamide (IMODIUM) capsule 2 mg, 4x Daily PRN    [Held by provider] losartan (COZAAR) tablet 12.5 mg, Daily    menthol-methyl salicylate (BENGAY) 10-15 % cream, 4x Daily PRN    methocarbamol (ROBAXIN) tablet 500 mg, TID    metoprolol succinate (TOPROL-XL) 24 hr tablet 25 mg, Daily    naloxone (NARCAN) 0.04 mg/mL syringe 0.04 mg, Q1MIN PRN    ondansetron (ZOFRAN) injection 4 mg, Q6H PRN    oxyCODONE (ROXICODONE) IR tablet 5 mg, Q4H PRN **OR** oxyCODONE (ROXICODONE) immediate release tablet 10 mg, Q4H PRN    pantoprazole (PROTONIX) EC tablet 40 mg, BID AC    pentoxifylline (TRENtal) ER tablet 400 mg, TID With Meals    potassium chloride (Klor-Con M20) CR tablet 20 mEq, Daily    predniSONE tablet 5 mg, BID With Meals    simethicone (MYLICON) chewable tablet 80 mg, Q6H PRN    sucralfate (CARAFATE) tablet 1 g, 4x Daily (AC & HS)    tamsulosin (FLOMAX) capsule 0.4 mg,  Daily With Dinner    venlafaxine (EFFEXOR) tablet 25 mg, Daily    Administrative Statements   Today, Patient Was Seen By: OMAIRA Timmons      **Please Note: This note may have been constructed using a voice recognition system.**   Brianna Honeycutt (niece)

## 2025-01-14 NOTE — ASSESSMENT & PLAN NOTE
Wt Readings from Last 3 Encounters:   01/13/25 89.2 kg (196 lb 10.4 oz)   12/26/24 100 kg (220 lb 14.4 oz)   12/13/24 103 kg (227 lb)   Home diuretic: PO Lasix 40mg QD -Lasix on hold due to poor p.o. intake  Continue metoprolol, losartan, Jardiance  Low-salt diet  Monitor I/O, daily weights-net fluid balance and weight is down

## 2025-01-15 PROBLEM — R11.0 NAUSEA: Status: ACTIVE | Noted: 2025-01-15

## 2025-01-15 LAB
ANION GAP SERPL CALCULATED.3IONS-SCNC: 10 MMOL/L (ref 4–13)
BASOPHILS # BLD AUTO: 0.03 THOUSANDS/ΜL (ref 0–0.1)
BASOPHILS NFR BLD AUTO: 0 % (ref 0–1)
BUN SERPL-MCNC: 18 MG/DL (ref 5–25)
CALCIUM SERPL-MCNC: 7.7 MG/DL (ref 8.4–10.2)
CALPROTECTIN STL-MCNC: 320 ΜG/G
CHLORIDE SERPL-SCNC: 108 MMOL/L (ref 96–108)
CO2 SERPL-SCNC: 21 MMOL/L (ref 21–32)
CREAT SERPL-MCNC: 0.72 MG/DL (ref 0.6–1.3)
ELASTASE PANC STL-MCNT: 796 UG/G
EOSINOPHIL # BLD AUTO: 0.13 THOUSAND/ΜL (ref 0–0.61)
EOSINOPHIL NFR BLD AUTO: 2 % (ref 0–6)
ERYTHROCYTE [DISTWIDTH] IN BLOOD BY AUTOMATED COUNT: 15.6 % (ref 11.6–15.1)
G LAMBLIA AG STL QL IA: NEGATIVE
GFR SERPL CREATININE-BSD FRML MDRD: 88 ML/MIN/1.73SQ M
GLUCOSE SERPL-MCNC: 78 MG/DL (ref 65–140)
HCT VFR BLD AUTO: 31.5 % (ref 36.5–49.3)
HGB BLD-MCNC: 9.6 G/DL (ref 12–17)
IMM GRANULOCYTES # BLD AUTO: 0.11 THOUSAND/UL (ref 0–0.2)
IMM GRANULOCYTES NFR BLD AUTO: 1 % (ref 0–2)
LYMPHOCYTES # BLD AUTO: 0.79 THOUSANDS/ΜL (ref 0.6–4.47)
LYMPHOCYTES NFR BLD AUTO: 9 % (ref 14–44)
MAGNESIUM SERPL-MCNC: 2 MG/DL (ref 1.9–2.7)
MCH RBC QN AUTO: 28.4 PG (ref 26.8–34.3)
MCHC RBC AUTO-ENTMCNC: 30.5 G/DL (ref 31.4–37.4)
MCV RBC AUTO: 93 FL (ref 82–98)
MONOCYTES # BLD AUTO: 0.6 THOUSAND/ΜL (ref 0.17–1.22)
MONOCYTES NFR BLD AUTO: 7 % (ref 4–12)
NEUTROPHILS # BLD AUTO: 7.1 THOUSANDS/ΜL (ref 1.85–7.62)
NEUTS SEG NFR BLD AUTO: 81 % (ref 43–75)
NRBC BLD AUTO-RTO: 0 /100 WBCS
PLATELET # BLD AUTO: 450 THOUSANDS/UL (ref 149–390)
PMV BLD AUTO: 9.5 FL (ref 8.9–12.7)
POTASSIUM SERPL-SCNC: 3.3 MMOL/L (ref 3.5–5.3)
RBC # BLD AUTO: 3.38 MILLION/UL (ref 3.88–5.62)
SODIUM SERPL-SCNC: 139 MMOL/L (ref 135–147)
WBC # BLD AUTO: 8.76 THOUSAND/UL (ref 4.31–10.16)

## 2025-01-15 PROCEDURE — 97530 THERAPEUTIC ACTIVITIES: CPT

## 2025-01-15 PROCEDURE — 85025 COMPLETE CBC W/AUTO DIFF WBC: CPT | Performed by: NURSE PRACTITIONER

## 2025-01-15 PROCEDURE — 99232 SBSQ HOSP IP/OBS MODERATE 35: CPT | Performed by: NURSE PRACTITIONER

## 2025-01-15 PROCEDURE — 97112 NEUROMUSCULAR REEDUCATION: CPT

## 2025-01-15 PROCEDURE — 99232 SBSQ HOSP IP/OBS MODERATE 35: CPT | Performed by: INTERNAL MEDICINE

## 2025-01-15 PROCEDURE — 83735 ASSAY OF MAGNESIUM: CPT | Performed by: NURSE PRACTITIONER

## 2025-01-15 PROCEDURE — 97535 SELF CARE MNGMENT TRAINING: CPT

## 2025-01-15 PROCEDURE — 80048 BASIC METABOLIC PNL TOTAL CA: CPT | Performed by: NURSE PRACTITIONER

## 2025-01-15 RX ADMIN — DICLOFENAC SODIUM 2 G: 10 GEL TOPICAL at 12:46

## 2025-01-15 RX ADMIN — TAMSULOSIN HYDROCHLORIDE 0.4 MG: 0.4 CAPSULE ORAL at 16:11

## 2025-01-15 RX ADMIN — SUCRALFATE 1 G: 1 TABLET ORAL at 12:45

## 2025-01-15 RX ADMIN — ATORVASTATIN CALCIUM 80 MG: 80 TABLET, FILM COATED ORAL at 16:11

## 2025-01-15 RX ADMIN — POTASSIUM CHLORIDE 20 MEQ: 1500 TABLET, EXTENDED RELEASE ORAL at 08:09

## 2025-01-15 RX ADMIN — SUCRALFATE 1 G: 1 TABLET ORAL at 21:42

## 2025-01-15 RX ADMIN — PREDNISONE 5 MG: 5 TABLET ORAL at 16:11

## 2025-01-15 RX ADMIN — FAMOTIDINE 20 MG: 20 TABLET, FILM COATED ORAL at 08:09

## 2025-01-15 RX ADMIN — PENTOXIFYLLINE 400 MG: 400 TABLET, EXTENDED RELEASE ORAL at 08:12

## 2025-01-15 RX ADMIN — ALLOPURINOL 100 MG: 100 TABLET ORAL at 08:09

## 2025-01-15 RX ADMIN — EMPAGLIFLOZIN 10 MG: 10 TABLET, FILM COATED ORAL at 08:13

## 2025-01-15 RX ADMIN — FERROUS SULFATE TAB 325 MG (65 MG ELEMENTAL FE) 325 MG: 325 (65 FE) TAB at 08:09

## 2025-01-15 RX ADMIN — ONDANSETRON 4 MG: 2 INJECTION INTRAMUSCULAR; INTRAVENOUS at 10:35

## 2025-01-15 RX ADMIN — ASPIRIN 81 MG CHEWABLE TABLET 81 MG: 81 TABLET CHEWABLE at 08:09

## 2025-01-15 RX ADMIN — DICLOFENAC SODIUM 2 G: 10 GEL TOPICAL at 08:13

## 2025-01-15 RX ADMIN — OXYCODONE HYDROCHLORIDE 10 MG: 10 TABLET ORAL at 16:15

## 2025-01-15 RX ADMIN — LOPERAMIDE HYDROCHLORIDE 2 MG: 2 CAPSULE ORAL at 16:15

## 2025-01-15 RX ADMIN — PANTOPRAZOLE SODIUM 40 MG: 40 TABLET, DELAYED RELEASE ORAL at 05:52

## 2025-01-15 RX ADMIN — GABAPENTIN 600 MG: 300 CAPSULE ORAL at 08:09

## 2025-01-15 RX ADMIN — METHOCARBAMOL 500 MG: 500 TABLET ORAL at 16:12

## 2025-01-15 RX ADMIN — ACETAMINOPHEN 975 MG: 325 TABLET, FILM COATED ORAL at 21:42

## 2025-01-15 RX ADMIN — METHOCARBAMOL 500 MG: 500 TABLET ORAL at 21:42

## 2025-01-15 RX ADMIN — CLOPIDOGREL BISULFATE 75 MG: 75 TABLET, FILM COATED ORAL at 08:09

## 2025-01-15 RX ADMIN — PANTOPRAZOLE SODIUM 40 MG: 40 TABLET, DELAYED RELEASE ORAL at 16:12

## 2025-01-15 RX ADMIN — GABAPENTIN 600 MG: 300 CAPSULE ORAL at 17:00

## 2025-01-15 RX ADMIN — PENTOXIFYLLINE 400 MG: 400 TABLET, EXTENDED RELEASE ORAL at 12:45

## 2025-01-15 RX ADMIN — LOPERAMIDE HYDROCHLORIDE 2 MG: 2 CAPSULE ORAL at 05:52

## 2025-01-15 RX ADMIN — GUAIFENESIN 600 MG: 600 TABLET, EXTENDED RELEASE ORAL at 21:42

## 2025-01-15 RX ADMIN — SUCRALFATE 1 G: 1 TABLET ORAL at 05:52

## 2025-01-15 RX ADMIN — SUCRALFATE 1 G: 1 TABLET ORAL at 16:11

## 2025-01-15 RX ADMIN — FINASTERIDE 5 MG: 5 TABLET, FILM COATED ORAL at 08:09

## 2025-01-15 RX ADMIN — ACETAMINOPHEN 975 MG: 325 TABLET, FILM COATED ORAL at 05:52

## 2025-01-15 RX ADMIN — METHOCARBAMOL 500 MG: 500 TABLET ORAL at 08:09

## 2025-01-15 RX ADMIN — GUAIFENESIN 600 MG: 600 TABLET, EXTENDED RELEASE ORAL at 08:09

## 2025-01-15 RX ADMIN — OXYCODONE HYDROCHLORIDE 10 MG: 10 TABLET ORAL at 06:07

## 2025-01-15 RX ADMIN — LOPERAMIDE HYDROCHLORIDE 2 MG: 2 CAPSULE ORAL at 08:15

## 2025-01-15 RX ADMIN — ACETAMINOPHEN 975 MG: 325 TABLET, FILM COATED ORAL at 16:12

## 2025-01-15 RX ADMIN — ENOXAPARIN SODIUM 40 MG: 40 INJECTION SUBCUTANEOUS at 08:15

## 2025-01-15 RX ADMIN — EZETIMIBE 10 MG: 10 TABLET ORAL at 08:09

## 2025-01-15 RX ADMIN — DICLOFENAC SODIUM 2 G: 10 GEL TOPICAL at 17:00

## 2025-01-15 RX ADMIN — VENLAFAXINE 25 MG: 25 TABLET ORAL at 08:13

## 2025-01-15 RX ADMIN — PREDNISONE 5 MG: 5 TABLET ORAL at 08:09

## 2025-01-15 RX ADMIN — HYDROMORPHONE HYDROCHLORIDE 0.2 MG: 0.2 INJECTION, SOLUTION INTRAMUSCULAR; INTRAVENOUS; SUBCUTANEOUS at 08:15

## 2025-01-15 RX ADMIN — PENTOXIFYLLINE 400 MG: 400 TABLET, EXTENDED RELEASE ORAL at 16:16

## 2025-01-15 NOTE — PROGRESS NOTES
Progress Note - Hospitalist   Name: Royce Rene 79 y.o. male I MRN: [id number]  [facility] Date of Admission: 1/1/2025   Date of Service: 1/15/2025 I Hospital Day: 14     Assessment & Plan  PAD (peripheral artery disease) (HCC)  Patient was recently admitted to [facility] for failure to thrive and generalized weakness following chemotherapy.  Patient was pending placement to rehab facility when he was noted to have gangrene on the right foot and poor healing wound on the left.  Case was discussed with vascular surgery who recommended transfer to [place].    Patient was seen by both podiatry and vascular surgery, CT of the left lower extremity showed severe disease.  No revascularization options available, recommended right AKA which was done 1/8.  Local wound care of right stump per vascular surgery  Patient has stable wounds on the left LE without clinical signs of infection.  Continue local wound care per podiatry on the left  Weightbearing as tolerated  Continue aspirin, Plavix, statin, Trental.   Acute pain has been following, signing off 1/14.  Continue ATC APAP, Robaxin, gabapentin as needed oxycodone.   PT/OT recommending rehab, case management following. Pending auth.   Chronic combined systolic and diastolic CHF (congestive heart failure) ([facility])  Wt Readings from Last 3 Encounters:   01/15/25 88.1 kg (194 lb 3.6 oz)   12/26/24 100 kg (220 lb 14.4 oz)   12/13/24 103 kg (227 lb)   Can continue metoprolol, Jardiance.  Lasix on hold due to poor p.o. intake/diarrhea.   Losartan has also been held  Low-salt diet  Monitor I/O, daily weights-net fluid balance and weight is down    Prostate cancer (HCC)  History of prostate cancer follows with oncology receiving chemotherapy (last on 12/12)  Follows with Dr. Hernandez  Current regimen is Cabazitaxel, Neulasta, prednisone, Lupron, denosumab  Continue to follow in outpatient setting    Diarrhea  Patient with diarrhea since receiving last round of  chemotherapy in December 2024.  Also with intermittent nausea/vomiting and overall poor oral intake.    C. difficile, bacterial stool panel negative   Given lack of improvement despite Imodium, GI was consulted.  Additional stool studies with fecal fat, Giardia and stool sodium ordered and pending  If no spontaneous resolution/improvement over the next few weeks, can consider colonoscopy as outpatient.   Will continue loperamide PRN  Added Banatrol flakes BID 1/14  CAD (coronary artery disease)  Continue aspirin, Plavix, beta-blocker, statin  Urinary retention  Moser catheter placed 1/2  Continue Proscar/Flomax  Void trial at rehab when more mobile  Iron deficiency anemia  Lab Results   Component Value Date    HGB 9.6 (L) 01/15/2025    HGB 9.7 (L) 01/13/2025    HGB 7.3 (L) 01/12/2025    HGB 7.5 (L) 01/10/2025    HGB 8.1 (L) 01/09/2025    CONCFE 10 (L) 12/28/2024    IRON 21 (L) 12/28/2024    FERRITIN 1,012 (H) 12/28/2024    TIBC 214.2 (L) 12/28/2024     Continue iron supplementation  Status post 1 unit PRBC 1/12  Monitor hemoglobin, additional transfusions as needed  Mixed hyperlipidemia  Continue statin  History of transcatheter aortic valve replacement (TAVR)  Noted  Severe protein-calorie malnutrition (HCC)  Malnutrition Findings:   Adult Malnutrition type: Acute illness  Adult Degree of Malnutrition: Other severe protein calorie malnutrition  Malnutrition Characteristics: Inadequate energy, Weight loss                  360 Statement: related to catabolic illness, inadequate energy intake due to nausea, diarrhea, as evidenced by, 10% weight loss last months, intake < 50% estimated energy requirements for > 5 days, dark sunken orbital. Treatment: Regular diet, oral nutrition supplements.    BMI Findings:           Body mass index is 29.53 kg/m².     Ulcer of left foot, limited to breakdown of skin (HCC)  Pic under media.  Low suspicion for active infection  Per podiatry, no surgical plans  Status post angiogram  with the intervention previously  Continue with local wound care      Ambulatory dysfunction  Baseline- close to wheelchair bounded, walks minimally  Status post AKA  Plan for rehab on discharge    VTE Pharmacologic Prophylaxis: VTE Score: 6 High Risk (Score >/= 5) - Pharmacological DVT Prophylaxis Ordered: enoxaparin (Lovenox). Sequential Compression Devices Ordered.    Mobility:   Basic Mobility Inpatient Raw Score: 16  JH-HLM Goal: 5: Stand one or more mins  JH-HLM Achieved: 3: Sit at edge of bed  JH-HLM Goal NOT achieved. Continue with multidisciplinary rounding and encourage appropriate mobility to improve upon JH-HLM goals.    Patient Centered Rounds: I performed bedside rounds with nursing staff today.   Discussions with Specialists or Other Care Team Provider: nursing, case management, GI fellow    Education and Discussions with Family / Patient: Attempted to update  (son) via phone. Left voicemail.  Rodrigue    Current Length of Stay: 14 day(s)  Current Patient Status: Inpatient   Certification Statement: The patient will continue to require additional inpatient hospital stay due to pending insurance auth for rehab   Discharge Plan: Anticipate discharge in 24-48 hrs to rehab facility.    Code Status: Level 1 - Full Code    Subjective   No new complaints. Diarrhea slightly improved. Still with overall poor appetite. No nausea today. No abdominal pain. Pain controlled.     Objective :  Temp:  [97.7 °F (36.5 °C)-97.9 °F (36.6 °C)] 97.9 °F (36.6 °C)  HR:  [85-87] 87  BP: (101-136)/() 101/57  Resp:  [17-19] 18  SpO2:  [92 %-96 %] 94 %  O2 Device: None (Room air)    Body mass index is 29.53 kg/m².     Input and Output Summary (last 24 hours):     Intake/Output Summary (Last 24 hours) at 1/15/2025 1234  Last data filed at 1/15/2025 0736  Gross per 24 hour   Intake 1080 ml   Output 650 ml   Net 430 ml       Physical Exam  Vitals and nursing note reviewed.   Constitutional:       General: He is  not in acute distress.     Appearance: He is obese.   Cardiovascular:      Rate and Rhythm: Normal rate.   Pulmonary:      Breath sounds: Decreased breath sounds present.   Abdominal:      General: Bowel sounds are normal.      Palpations: Abdomen is soft.      Tenderness: There is no abdominal tenderness.   Genitourinary:     Comments: Moser in place with clear yellow urine  Musculoskeletal:      Comments: Right AKA with stump dressing CDI, Left LE dressing CDI   Skin:     General: Skin is warm.      Coloration: Skin is pale.   Neurological:      Mental Status: He is alert and oriented to person, place, and time. Mental status is at baseline.   Psychiatric:         Mood and Affect: Mood is depressed. Affect is flat.           Lines/Drains:  Lines/Drains/Airways       Active Status       Name Placement date Placement time Site Days    Urethral Catheter Straight-tip;Non-latex 16 Fr. 01/02/25  1611  Straight-tip;Non-latex  12                  Urinary Catheter:  Goal for removal: N/A- Discharging with Moser         Central Line:  Goal for removal: Will discontinue when peripheral access obtained.                Lab Results: I have reviewed the following results:   Results from last 7 days   Lab Units 01/15/25  0512   WBC Thousand/uL 8.76   HEMOGLOBIN g/dL 9.6*   HEMATOCRIT % 31.5*   PLATELETS Thousands/uL 450*   SEGS PCT % 81*   LYMPHO PCT % 9*   MONO PCT % 7   EOS PCT % 2     Results from last 7 days   Lab Units 01/15/25  0512 01/13/25  1301 01/12/25  0431   SODIUM mmol/L 139   < > 140   POTASSIUM mmol/L 3.3*   < > 3.6   CHLORIDE mmol/L 108   < > 110*   CO2 mmol/L 21   < > 22   BUN mg/dL 18   < > 9   CREATININE mg/dL 0.72   < > 0.68   ANION GAP mmol/L 10   < > 8   CALCIUM mg/dL 7.7*   < > 8.1*   ALBUMIN g/dL  --   --  2.9*   TOTAL BILIRUBIN mg/dL  --   --  0.40   ALK PHOS U/L  --   --  88   ALT U/L  --   --  27   AST U/L  --   --  17   GLUCOSE RANDOM mg/dL 78   < > 87    < > = values in this interval not displayed.                        Recent Cultures (last 7 days):   Results from last 7 days   Lab Units 01/11/25  1827   C DIFF TOXIN B BY PCR  Negative       Imaging Results Review: No pertinent imaging studies reviewed.  Other Study Results Review: No additional pertinent studies reviewed.    Last 24 Hours Medication List:     Current Facility-Administered Medications:     acetaminophen (TYLENOL) tablet 975 mg, Q8H DEWAYNE    allopurinol (ZYLOPRIM) tablet 100 mg, Daily    aspirin chewable tablet 81 mg, Daily    atorvastatin (LIPITOR) tablet 80 mg, Daily With Dinner    bismuth subsalicylate (PEPTO BISMOL) oral suspension 524 mg, Q6H PRN    clopidogrel (PLAVIX) tablet 75 mg, Daily    Diclofenac Sodium (VOLTAREN) 1 % topical gel 2 g, 4x Daily    Empagliflozin (JARDIANCE) tablet 10 mg, QAM    enoxaparin (LOVENOX) subcutaneous injection 40 mg, Daily    ezetimibe (ZETIA) tablet 10 mg, Daily    famotidine (PEPCID) tablet 20 mg, Daily    ferrous sulfate tablet 325 mg, Daily    finasteride (PROSCAR) tablet 5 mg, Daily    [Held by provider] furosemide (LASIX) tablet 40 mg, Daily    gabapentin (NEURONTIN) capsule 600 mg, BID    guaiFENesin (MUCINEX) 12 hr tablet 600 mg, Q12H DEWAYNE    loperamide (IMODIUM) capsule 2 mg, 4x Daily PRN    [Held by provider] losartan (COZAAR) tablet 12.5 mg, Daily    menthol-methyl salicylate (BENGAY) 10-15 % cream, 4x Daily PRN    methocarbamol (ROBAXIN) tablet 500 mg, TID    metoprolol succinate (TOPROL-XL) 24 hr tablet 25 mg, Daily    naloxone (NARCAN) 0.04 mg/mL syringe 0.04 mg, Q1MIN PRN    ondansetron (ZOFRAN) injection 4 mg, Q6H PRN    oxyCODONE (ROXICODONE) IR tablet 5 mg, Q4H PRN **OR** oxyCODONE (ROXICODONE) immediate release tablet 10 mg, Q4H PRN    pantoprazole (PROTONIX) EC tablet 40 mg, BID AC    pentoxifylline (TRENtal) ER tablet 400 mg, TID With Meals    potassium chloride (Klor-Con M20) CR tablet 20 mEq, Daily    predniSONE tablet 5 mg, BID With Meals    simethicone (MYLICON) chewable tablet 80 mg,  Q6H PRN    sucralfate (CARAFATE) tablet 1 g, 4x Daily (AC & HS)    tamsulosin (FLOMAX) capsule 0.4 mg, Daily With Dinner    venlafaxine (EFFEXOR) tablet 25 mg, Daily    Administrative Statements   Today, Patient Was Seen By: OMAIRA Timmons      **Please Note: This note may have been constructed using a voice recognition system.**

## 2025-01-15 NOTE — ASSESSMENT & PLAN NOTE
Patient was recently admitted to [facility] for failure to thrive and generalized weakness following chemotherapy.  Patient was pending placement to rehab facility when he was noted to have gangrene on the right foot and poor healing wound on the left.  Case was discussed with vascular surgery who recommended transfer to [place].    Patient was seen by both podiatry and vascular surgery, CT of the left lower extremity showed severe disease.  No revascularization options available, recommended right AKA which was done 1/8.  Local wound care of right stump per vascular surgery  Patient has stable wounds on the left LE without clinical signs of infection.  Continue local wound care per podiatry on the left  Weightbearing as tolerated  Continue aspirin, Plavix, statin, Trental.   Acute pain has been following, signing off 1/14.  Continue ATC APAP, Robaxin, gabapentin as needed oxycodone.   PT/OT recommending rehab, case management following. Pending auth.

## 2025-01-15 NOTE — PLAN OF CARE
Problem: OCCUPATIONAL THERAPY ADULT  Goal: Performs self-care activities at highest level of function for planned discharge setting.  See evaluation for individualized goals.  Description: Treatment Interventions: ADL retraining, Functional transfer training, UE strengthening/ROM, Endurance training, Cognitive reorientation, Patient/family training, Equipment evaluation/education, Compensatory technique education, Continued evaluation, Energy conservation, Activityengagement          See flowsheet documentation for full assessment, interventions and recommendations.   Outcome: Progressing  Note: Limitation: Decreased ADL status, Decreased UE strength, Decreased Safe judgement during ADL, Decreased cognition, Decreased endurance, Decreased self-care trans, Decreased high-level ADLs  Prognosis: Good  Assessment: Pt seen on this date for OT session focusing on ADL retraining, cognitive reorientation, body mechanics, transfer retraining, increasing activity tolerance/endurance and EOB sitting to increase ability to participate in ADL/functional tasks. Pt was found in bed and was left in chair w/ all needs within reach, chair alarm on. Pt rolled to R and L for perienal hygiene, required total a for hygiene, but was able to roll with S. Bed mob completed w mod ax1, pt able to sit eob w S. STS and stand pivot to drop arm chair performed w max ax2, b/l HHA. Pt completed grooming tasks w min A/ S for teeth brushing and hair washing, respectively. See above for further detail. Pt w/ improvements in endurance, adl task completion, transfer ability, however is still limited 2* decreased ADL/High-level ADL status, decreased activity tolerance/endurance, decreased cognition, decreased self-care trans, decreased safety awareness and insight to condition.   The patient's raw score on the -PAC Daily Activity Inpatient Short Form is 16. A raw score of less than 19 suggests the patient may benefit from discharge to post-acute  rehabilitation services. Please refer to the recommendation of the Occupational Therapist for safe discharge planning.  Recommending pt D/C to  level 1 when medically stable. Pt will continue to benefit from acute OT services to meet goals.     Rehab Resource Intensity Level, OT: I (Maximum Resource Intensity)

## 2025-01-15 NOTE — PHYSICAL THERAPY NOTE
PHYSICAL THERAPY NOTE          Patient Name: Royce Rene  Today's Date: 1/15/2025       01/15/25 1353   PT Last Visit   PT Visit Date 01/15/25   Note Type   Note Type Treatment   Pain Assessment   Pain Assessment Tool 0-10   Pain Score 7   Pain Location/Orientation Orientation: Right;Location: Leg;Location: Incision   Pain Onset/Description Onset: Ongoing;Frequency: Constant/Continuous;Descriptor: Discomfort   Effect of Pain on Daily Activities limits comfort and mobility   Patient's Stated Pain Goal No pain   Hospital Pain Intervention(s) Repositioned;Ambulation/increased activity;Emotional support   Restrictions/Precautions   Weight Bearing Precautions Per Order Yes   RLE Weight Bearing Per Order NWB   LLE Weight Bearing Per Order WBAT   Braces or Orthoses Other (Comment)  (L surgical shoe)   Other Precautions Fall Risk;Chair Alarm;Bed Alarm;Pain;WBS;Cognitive  (R AKA)   General   Chart Reviewed Yes   Family/Caregiver Present No   Cognition   Overall Cognitive Status Impaired   Arousal/Participation Alert;Cooperative   Attention Attends with cues to redirect   Orientation Level Oriented X4   Memory Decreased short term memory;Decreased recall of recent events;Decreased recall of precautions   Following Commands Follows one step commands with increased time or repetition   Comments Patient is pleasant and cooperative   Subjective   Subjective Patient agreeable to PT tx   Bed Mobility   Rolling R 5  Supervision   Additional items Increased time required;Verbal cues;Bedrails   Rolling L 5  Supervision   Additional items Increased time required;Verbal cues;Bedrails   Supine to Sit 3  Moderate assistance   Additional items Assist x 1;Increased time required;Verbal cues;LE management;Bedrails   Sit to Supine 3  Moderate assistance   Additional items Assist x 1;Increased time required;Verbal cues;LE management;Bedrails   Transfers   Sit to  Stand 2  Maximal assistance   Additional items Assist x 2;Increased time required;Verbal cues   Stand to Sit 2  Maximal assistance   Additional items Assist x 2;Increased time required;Verbal cues   Stand pivot 2  Maximal assistance   Additional items Assist x 2;Increased time required;Verbal cues   Additional Comments SPT bed>drop arm chair using B/L HHA   Balance   Static Sitting Fair   Dynamic Sitting Fair -   Static Standing Poor   Dynamic Standing Poor -   Ambulatory Zero   Endurance Deficit   Endurance Deficit Yes   Endurance Deficit Description fatigue, weakness   Activity Tolerance   Activity Tolerance Patient limited by fatigue;Patient limited by pain   Medical Staff Made Aware OT, OTS   Nurse Made Aware RN cleared   Exercises   Balance training  sitting balance at EOB fair with ability to perform static and dynamic movements with close supervision -- patient standing balance poor with need for Max Ax2 to maintain stance. Patient able to hold stance about 30-45s prior to need for seated rest period. Patient unable to tolerate challenges to standing balance at this time.   Assessment   Prognosis Fair   Problem List Decreased endurance;Impaired balance;Decreased mobility;Impaired judgement;Decreased safety awareness;Obesity;Decreased skin integrity;Pain;Decreased range of motion;Decreased strength;Orthopedic restrictions   Assessment Patient received in bed. Patient agreeable to therapy. Patient noted to be soiled on arrival.  Patient performs bed mobility with supervision to roll L/R for hygiene. Patient performs supine>sit with Mod Ax1. Patient's sitting balance fair at EOB. Patient performs static/dynamic sitting/standing balance for improved strength core musculature and improve upright stability. See flowsheet for balance activities performed. Patient performs functional transfers with maximal assistance x2 using two hand hold. Patient performs x2 STS and x1 SPT. Stand pivot transfer performed from  bed>drop arm chair with B/L HHA and knee block.  Patient left in bedside chair with all needs met and call bell/personal items within reach. The patient's AM-PAC Basic Mobility Inpatient Short Form Raw Score is 9 showing further need for skilled PT services in order to improve functional mobility, decrease need for assistance, and return to PLOF. PT is recommending Level 1 - Maximum Resource Intensity on d/c from hospital.  Will continue to follow as able.   Barriers to Discharge Decreased caregiver support;Inaccessible home environment   Goals   Patient Goals to get stronger   PT Treatment Day 2   Plan   Treatment/Interventions ADL retraining;Functional transfer training;LE strengthening/ROM;Therapeutic exercise;Endurance training;Patient/family training;Bed mobility;Gait training;Spoke to nursing;Spoke to case management;OT   Progress Progressing toward goals   PT Frequency 2-3x/wk   Discharge Recommendation   Rehab Resource Intensity Level, PT I (Maximum Resource Intensity)   Equipment Recommended Walker;Wheelchair   AM-PAC Basic Mobility Inpatient   Turning in Flat Bed Without Bedrails 3   Lying on Back to Sitting on Edge of Flat Bed Without Bedrails 2   Moving Bed to Chair 1   Standing Up From Chair Using Arms 1   Walk in Room 1   Climb 3-5 Stairs With Railing 1   Basic Mobility Inpatient Raw Score 9   Turning Head Towards Sound 4   Follow Simple Instructions 3   Low Function Basic Mobility Raw Score  16   Low Function Basic Mobility Standardized Score  25.72   [facility] Highest Level Of Mobility   -HLM Goal 3: Sit at edge of bed   -HLM Achieved 4: Move to chair/commode   End of Consult   Patient Position at End of Consult All needs within reach;Bed/Chair alarm activated;Bedside chair     Nadine Gloria, PT, DPT

## 2025-01-15 NOTE — ASSESSMENT & PLAN NOTE
Wt Readings from Last 3 Encounters:   01/15/25 88.1 kg (194 lb 3.6 oz)   12/26/24 100 kg (220 lb 14.4 oz)   12/13/24 103 kg (227 lb)

## 2025-01-15 NOTE — ASSESSMENT & PLAN NOTE
Baseline- close to wheelchair bounded, walks minimally  Status post AKA  Plan for rehab on discharge

## 2025-01-15 NOTE — ASSESSMENT & PLAN NOTE
Royce Rene is a 79 y.o. male w/ PMH of Stage IV prostate cancer receiving chemotherapy (last dose 12/12/2024), CAD, HFpEF w/ AICD, and peripheral arterial disease s/p R AKA who presented on the 28th with generalized weakness who was transferred to [facility] on 1/1 for vascular surgery evaluation given gangrenous wounds on his lower extremities.  He had prior prolonged admission for generalized weakness and failure to thrive after recent chemotherapy.  He is now status post AKA.  Hospitalization complicated by persistent nausea and diarrhea for which GI has been consulted.  Suspect in setting of recent chemotherapy as well as prolonged hospitalization  Fortunately, patient's nausea has significantly improved and he is tolerating p.o. diet at this time  Continue as needed antiemetics  Continue with PPI and Pepcid and Carafate  Monitor intake closely  Could consider endoscopic evaluation in future if necessary  No further GI interventions anticipated at this time.  GI will sign off, however, remain available for questions.  Please reach out with any changes in clinical status

## 2025-01-15 NOTE — ASSESSMENT & PLAN NOTE
Wt Readings from Last 3 Encounters:   01/15/25 88.1 kg (194 lb 3.6 oz)   12/26/24 100 kg (220 lb 14.4 oz)   12/13/24 103 kg (227 lb)   Can continue metoprolol, Jardiance.  Lasix on hold due to poor p.o. intake/diarrhea.   Losartan has also been held  Low-salt diet  Monitor I/O, daily weights-net fluid balance and weight is down

## 2025-01-15 NOTE — PLAN OF CARE
Problem: PHYSICAL THERAPY ADULT  Goal: Performs mobility at highest level of function for planned discharge setting.  See evaluation for individualized goals.  Description: Treatment/Interventions: ADL retraining, Functional transfer training, LE strengthening/ROM, Elevations, Therapeutic exercise, Endurance training, Patient/family training, Bed mobility, Gait training, Spoke to nursing, Spoke to case management, OT  Equipment Recommended: Walker       See flowsheet documentation for full assessment, interventions and recommendations.  Outcome: Progressing  Note: Prognosis: Fair  Problem List: Decreased endurance, Impaired balance, Decreased mobility, Impaired judgement, Decreased safety awareness, Obesity, Decreased skin integrity, Pain, Decreased range of motion, Decreased strength, Orthopedic restrictions  Assessment: Patient received in bed. Patient agreeable to therapy. Patient noted to be soiled on arrival.  Patient performs bed mobility with supervision to roll L/R for hygiene. Patient performs supine>sit with Mod Ax1. Patient's sitting balance fair at EOB. Patient performs static/dynamic sitting/standing balance for improved strength core musculature and improve upright stability. See flowsheet for balance activities performed. Patient performs functional transfers with maximal assistance x2 using two hand hold. Patient performs x2 STS and x1 SPT. Stand pivot transfer performed from bed>drop arm chair with B/L HHA and knee block.  Patient left in bedside chair with all needs met and call bell/personal items within reach. The patient's AM-PAC Basic Mobility Inpatient Short Form Raw Score is 9 showing further need for skilled PT services in order to improve functional mobility, decrease need for assistance, and return to PLOF. PT is recommending Level 1 - Maximum Resource Intensity on d/c from hospital.  Will continue to follow as able.  Barriers to Discharge: Decreased caregiver support, Inaccessible home  environment     Rehab Resource Intensity Level, PT: I (Maximum Resource Intensity)    See flowsheet documentation for full assessment.

## 2025-01-15 NOTE — ASSESSMENT & PLAN NOTE
Malnutrition Findings:   Adult Malnutrition type: Acute illness  Adult Degree of Malnutrition: Other severe protein calorie malnutrition  Malnutrition Characteristics: Inadequate energy, Weight loss                  360 Statement: related to catabolic illness, inadequate energy intake due to nausea, diarrhea, as evidenced by, 10% weight loss last months, intake < 50% estimated energy requirements for > 5 days, dark sunken orbital. Treatment: Regular diet, oral nutrition supplements.    BMI Findings:           Body mass index is 29.53 kg/m².

## 2025-01-15 NOTE — PROGRESS NOTES
Progress Note - Gastroenterology   Name: Royce Rene 79 y.o. male I MRN: [id number]  Unit/Bed#: [facility] 523-01 I Date of Admission: 1/1/2025   Date of Service: 1/15/2025 I Hospital Day: 14    Assessment & Plan  Nausea  Royce Rene is a 79 y.o. male w/ PMH of Stage IV prostate cancer receiving chemotherapy (last dose 12/12/2024), CAD, HFpEF w/ AICD, and peripheral arterial disease s/p R AKA who presented on the 28th with generalized weakness who was transferred to [facility] on 1/1 for vascular surgery evaluation given gangrenous wounds on his lower extremities.  He had prior prolonged admission for generalized weakness and failure to thrive after recent chemotherapy.  He is now status post AKA.  Hospitalization complicated by persistent nausea and diarrhea for which GI has been consulted.  Suspect in setting of recent chemotherapy as well as prolonged hospitalization  Fortunately, patient's nausea has significantly improved and he is tolerating p.o. diet at this time  Continue as needed antiemetics  Continue with PPI and Pepcid and Carafate  Monitor intake closely  Could consider endoscopic evaluation in future if necessary  No further GI interventions anticipated at this time.  GI will sign off, however, remain available for questions.  Please reach out with any changes in clinical status    Diarrhea  Patient with ongoing diarrhea throughout hospitalization with negative C. difficile and stool enteric panel.  Fecal calprotectin slightly elevated at 320.  Fecal elastase, fecal fat, Giardia antigen all pending.  Improving with Imodium and banana flakes.  Continue with as needed Imodium and banana flakes  Monitor stool output  Follow-up studies as detailed above  Recommend outpatient follow-up in office to discuss colonoscopy for further evaluation of elevated fecal calprotectin  Prostate cancer (HCC)  Currently on chemotherapy with frequent nausea associated with doses received.        Subjective   Patient reports  feeling much better today.  His nausea has resolved and he was able to tolerate dinner and breakfast without issue.  States stools are firming up and becoming less frequent.  No acute complaint at this time.    Objective :  Temp:  [97.7 °F (36.5 °C)-98 °F (36.7 °C)] 97.7 °F (36.5 °C)  HR:  [85-97] 87  BP: (105-141)/() 108/75  Resp:  [17-19] 19  SpO2:  [92 %-96 %] 96 %    Physical Exam  Vitals and nursing note reviewed.   Constitutional:       General: He is not in acute distress.     Appearance: Normal appearance. He is not ill-appearing.   HENT:      Head: Normocephalic and atraumatic.      Nose: Nose normal.      Mouth/Throat:      Mouth: Mucous membranes are moist.   Eyes:      General: No scleral icterus.     Conjunctiva/sclera: Conjunctivae normal.   Cardiovascular:      Rate and Rhythm: Normal rate and regular rhythm.   Pulmonary:      Effort: Pulmonary effort is normal. No respiratory distress.   Abdominal:      General: Bowel sounds are normal. There is no distension.      Palpations: Abdomen is soft. There is no mass.      Tenderness: There is no abdominal tenderness. There is no guarding or rebound.      Hernia: No hernia is present.   Musculoskeletal:         General: Normal range of motion.      Cervical back: Normal range of motion.      Comments: Status post AKA   Skin:     General: Skin is warm and dry.      Coloration: Skin is not jaundiced.      Findings: No bruising.   Neurological:      General: No focal deficit present.      Mental Status: He is alert and oriented to person, place, and time.      Motor: Weakness present.   Psychiatric:         Mood and Affect: Mood normal.         Behavior: Behavior normal.           Lab Results: I have reviewed the following results:

## 2025-01-15 NOTE — ASSESSMENT & PLAN NOTE
Lab Results   Component Value Date    HGB 9.6 (L) 01/15/2025    HGB 9.7 (L) 01/13/2025    HGB 7.3 (L) 01/12/2025    HGB 7.5 (L) 01/10/2025    HGB 8.1 (L) 01/09/2025    CONCFE 10 (L) 12/28/2024    IRON 21 (L) 12/28/2024    FERRITIN 1,012 (H) 12/28/2024    TIBC 214.2 (L) 12/28/2024     Continue iron supplementation  Status post 1 unit PRBC 1/12  Monitor hemoglobin, additional transfusions as needed

## 2025-01-15 NOTE — OCCUPATIONAL THERAPY NOTE
Occupational Therapy Progress Note     Patient Name: Royce Rene  Today's Date: 1/15/2025  Problem List  Principal Problem:    PAD (peripheral artery disease) (HCC)  Active Problems:    Chronic combined systolic and diastolic CHF (congestive heart failure) (HCC)    Ischemic cardiomyopathy    Prostate cancer (HCC)    CAD (coronary artery disease)    Urinary retention    Ambulatory dysfunction    Ulcer of left foot, limited to breakdown of skin (HCC)    Mixed hyperlipidemia    Diarrhea    Iron deficiency anemia    History of transcatheter aortic valve replacement (TAVR)    Encounter for preoperative assessment for noncoronary cardiac surgery    Severe protein-calorie malnutrition (HCC)    Nausea              01/15/25 1354   OT Last Visit   OT Visit Date 01/15/25   Note Type   Note Type Treatment   Pain Assessment   Pain Assessment Tool 0-10   Pain Score 7   Pain Location/Orientation Orientation: Right;Location: Incision  (amptuation site)   Patient's Stated Pain Goal No pain   Hospital Pain Intervention(s) Repositioned;Ambulation/increased activity;Emotional support   Restrictions/Precautions   Weight Bearing Precautions Per Order Yes   RLE Weight Bearing Per Order NWB   LLE Weight Bearing Per Order WBAT   Braces or Orthoses Other (Comment)  (sx shoe)   Other Precautions Cognitive;Chair Alarm;Bed Alarm;WBS;Fall Risk;Pain   ADL   Where Assessed Chair   Grooming Assistance 5  Supervision/Setup   Grooming Deficit Teeth care;Brushing hair  (washing hair)   Grooming Comments pt in chair while completing oral care kit- was able to complete w one step directions. Also used shower cap to wash hair- able to abbie w/ both UE's and scrub head w/o A.   Bed Mobility   Rolling R 5  Supervision   Rolling L 5  Supervision   Supine to Sit 3  Moderate assistance   Additional items Assist x 1;Increased time required;Verbal cues;LE management   Sit to Supine 3  Moderate assistance   Additional items Assist x 1;Increased time required;LE  management;Verbal cues   Additional Comments pt found in bed w/ BM. Rolled to R and L w S for perineal hygiene. Pt able achieve EOB sitting w mod ax1 from therapist, assist for LE mgmt.   Transfers   Sit to Stand 2  Maximal assistance   Additional items Assist x 2;Increased time required;Verbal cues   Stand to Sit 2  Maximal assistance   Additional items Assist x 2;Increased time required;Verbal cues   Stand pivot 2  Maximal assistance   Additional items Assist x 2;Increased time required;Verbal cues   Additional Comments b/l HHA and knee blocking. 2 sts performed w max ax2, stand pivot to drop arm chair w max ax2 as well. pt able to readjust in chair w mod ax2.   Cognition   Overall Cognitive Status Impaired   Arousal/Participation Responsive;Alert   Attention Attends with cues to redirect   Orientation Level Oriented X4   Memory Decreased short term memory   Following Commands Follows one step commands with increased time or repetition   Comments pt requires vc for redirection, benefits from simple one step commands during session. requires inc time to complete all tasks and appears to have decreased processing speeds.   Activity Tolerance   Activity Tolerance Patient tolerated treatment well   Medical Staff Made Aware DPT 2' pts med complexity, comorbidites and regression from baseline.   Assessment   Assessment Pt seen on this date for OT session focusing on ADL retraining, cognitive reorientation, body mechanics, transfer retraining, increasing activity tolerance/endurance and EOB sitting to increase ability to participate in ADL/functional tasks. Pt was found in bed and was left in chair w/ all needs within reach, chair alarm on. Pt rolled to R and L for perienal hygiene, required total a for hygiene, but was able to roll with S. Bed mob completed w mod ax1, pt able to sit eob w S. STS and stand pivot to drop arm chair performed w max ax2, b/l HHA. Pt completed grooming tasks w min A/ S for teeth brushing and  hair washing, respectively. See above for further detail. Pt w/ improvements in endurance, adl task completion, transfer ability, however is still limited 2* decreased ADL/High-level ADL status, decreased activity tolerance/endurance, decreased cognition, decreased self-care trans, decreased safety awareness and insight to condition.   The patient's raw score on the AM-PAC Daily Activity Inpatient Short Form is 16. A raw score of less than 19 suggests the patient may benefit from discharge to post-acute rehabilitation services. Please refer to the recommendation of the Occupational Therapist for safe discharge planning.  Recommending pt D/C to  level 1 when medically stable. Pt will continue to benefit from acute OT services to meet goals.   Plan   Treatment Interventions ADL retraining;Functional transfer training;Endurance training;Patient/family training;Equipment evaluation/education;Compensatory technique education;Energy conservation;Activityengagement   Goal Expiration Date 01/17/25   OT Treatment Day 3   OT Frequency 2-3x/wk   Discharge Recommendation   Rehab Resource Intensity Level, OT I (Maximum Resource Intensity)   AM-PAC Daily Activity Inpatient   Lower Body Dressing 2   Bathing 2   Toileting 2   Upper Body Dressing 3   Grooming 3   Eating 4   Daily Activity Raw Score 16   Daily Activity Standardized Score (Calc for Raw Score >=11) 35.96   AM-PAC Applied Cognition Inpatient   Following a Speech/Presentation 3   Understanding Ordinary Conversation 3   Taking Medications 2   Remembering Where Things Are Placed or Put Away 2   Remembering List of 4-5 Errands 2   Taking Care of Complicated Tasks 2   Applied Cognition Raw Score 14   Applied Cognition Standardized Score 32.02   Modified Bonner Scale   Modified Bonner Scale 4   End of Consult   Education Provided Yes   Patient Position at End of Consult Bedside chair;Bed/Chair alarm activated;All needs within reach   Nurse Communication Nurse aware of consult        Odalis Hoffmann, MOT, OTR/L

## 2025-01-15 NOTE — ASSESSMENT & PLAN NOTE
Patient with ongoing diarrhea throughout hospitalization with negative C. difficile and stool enteric panel.  Fecal calprotectin slightly elevated at 320.  Fecal elastase, fecal fat, Giardia antigen all pending.  Improving with Imodium and banana flakes.  Continue with as needed Imodium and banana flakes  Monitor stool output  Follow-up studies as detailed above  Recommend outpatient follow-up in office to discuss colonoscopy for further evaluation of elevated fecal calprotectin

## 2025-01-16 PROBLEM — Z89.611 S/P AKA (ABOVE KNEE AMPUTATION) UNILATERAL, RIGHT (HCC): Status: ACTIVE | Noted: 2025-01-16

## 2025-01-16 PROBLEM — Z78.9 IMPAIRED MOBILITY AND ACTIVITIES OF DAILY LIVING: Status: ACTIVE | Noted: 2025-01-16

## 2025-01-16 PROBLEM — Z74.09 IMPAIRED MOBILITY AND ACTIVITIES OF DAILY LIVING: Status: ACTIVE | Noted: 2025-01-16

## 2025-01-16 LAB
ANION GAP SERPL CALCULATED.3IONS-SCNC: 9 MMOL/L (ref 4–13)
BUN SERPL-MCNC: 20 MG/DL (ref 5–25)
CALCIUM SERPL-MCNC: 7.5 MG/DL (ref 8.4–10.2)
CHLORIDE SERPL-SCNC: 106 MMOL/L (ref 96–108)
CO2 SERPL-SCNC: 22 MMOL/L (ref 21–32)
CREAT SERPL-MCNC: 0.72 MG/DL (ref 0.6–1.3)
GFR SERPL CREATININE-BSD FRML MDRD: 88 ML/MIN/1.73SQ M
GLUCOSE SERPL-MCNC: 78 MG/DL (ref 65–140)
POTASSIUM SERPL-SCNC: 3.4 MMOL/L (ref 3.5–5.3)
SODIUM SERPL-SCNC: 137 MMOL/L (ref 135–147)

## 2025-01-16 PROCEDURE — NC001 PR NO CHARGE: Performed by: PODIATRIST

## 2025-01-16 PROCEDURE — 99233 SBSQ HOSP IP/OBS HIGH 50: CPT

## 2025-01-16 PROCEDURE — 80048 BASIC METABOLIC PNL TOTAL CA: CPT | Performed by: FAMILY MEDICINE

## 2025-01-16 PROCEDURE — 99232 SBSQ HOSP IP/OBS MODERATE 35: CPT | Performed by: INTERNAL MEDICINE

## 2025-01-16 RX ORDER — DIPHENOXYLATE HYDROCHLORIDE AND ATROPINE SULFATE 2.5; .025 MG/1; MG/1
1 TABLET ORAL 4 TIMES DAILY PRN
Status: DISCONTINUED | OUTPATIENT
Start: 2025-01-16 | End: 2025-01-24 | Stop reason: HOSPADM

## 2025-01-16 RX ORDER — POTASSIUM CHLORIDE 1500 MG/1
20 TABLET, EXTENDED RELEASE ORAL ONCE
Status: COMPLETED | OUTPATIENT
Start: 2025-01-16 | End: 2025-01-16

## 2025-01-16 RX ORDER — POTASSIUM CHLORIDE 1500 MG/1
40 TABLET, EXTENDED RELEASE ORAL DAILY
Status: DISCONTINUED | OUTPATIENT
Start: 2025-01-17 | End: 2025-01-24 | Stop reason: HOSPADM

## 2025-01-16 RX ADMIN — PREDNISONE 5 MG: 5 TABLET ORAL at 08:39

## 2025-01-16 RX ADMIN — DICLOFENAC SODIUM 2 G: 10 GEL TOPICAL at 16:58

## 2025-01-16 RX ADMIN — ONDANSETRON 4 MG: 2 INJECTION INTRAMUSCULAR; INTRAVENOUS at 11:42

## 2025-01-16 RX ADMIN — METHOCARBAMOL 500 MG: 500 TABLET ORAL at 21:55

## 2025-01-16 RX ADMIN — PANTOPRAZOLE SODIUM 40 MG: 40 TABLET, DELAYED RELEASE ORAL at 05:47

## 2025-01-16 RX ADMIN — SUCRALFATE 1 G: 1 TABLET ORAL at 11:54

## 2025-01-16 RX ADMIN — METOPROLOL SUCCINATE 25 MG: 25 TABLET, EXTENDED RELEASE ORAL at 08:39

## 2025-01-16 RX ADMIN — METHOCARBAMOL 500 MG: 500 TABLET ORAL at 15:15

## 2025-01-16 RX ADMIN — FAMOTIDINE 20 MG: 20 TABLET, FILM COATED ORAL at 08:39

## 2025-01-16 RX ADMIN — POTASSIUM CHLORIDE 20 MEQ: 1500 TABLET, EXTENDED RELEASE ORAL at 08:44

## 2025-01-16 RX ADMIN — SUCRALFATE 1 G: 1 TABLET ORAL at 05:47

## 2025-01-16 RX ADMIN — ASPIRIN 81 MG CHEWABLE TABLET 81 MG: 81 TABLET CHEWABLE at 08:40

## 2025-01-16 RX ADMIN — ACETAMINOPHEN 975 MG: 325 TABLET, FILM COATED ORAL at 15:00

## 2025-01-16 RX ADMIN — ACETAMINOPHEN 975 MG: 325 TABLET, FILM COATED ORAL at 05:47

## 2025-01-16 RX ADMIN — OXYCODONE HYDROCHLORIDE 10 MG: 10 TABLET ORAL at 07:12

## 2025-01-16 RX ADMIN — GABAPENTIN 600 MG: 300 CAPSULE ORAL at 08:38

## 2025-01-16 RX ADMIN — SUCRALFATE 1 G: 1 TABLET ORAL at 21:56

## 2025-01-16 RX ADMIN — GUAIFENESIN 600 MG: 600 TABLET, EXTENDED RELEASE ORAL at 21:55

## 2025-01-16 RX ADMIN — LOPERAMIDE HYDROCHLORIDE 2 MG: 2 CAPSULE ORAL at 12:15

## 2025-01-16 RX ADMIN — EZETIMIBE 10 MG: 10 TABLET ORAL at 08:38

## 2025-01-16 RX ADMIN — DICLOFENAC SODIUM 2 G: 10 GEL TOPICAL at 08:54

## 2025-01-16 RX ADMIN — PANTOPRAZOLE SODIUM 40 MG: 40 TABLET, DELAYED RELEASE ORAL at 15:15

## 2025-01-16 RX ADMIN — DIPHENOXYLATE HYDROCHLORIDE AND ATROPINE SULFATE 1 TABLET: .025; 2.5 TABLET ORAL at 12:15

## 2025-01-16 RX ADMIN — SUCRALFATE 1 G: 1 TABLET ORAL at 15:15

## 2025-01-16 RX ADMIN — ALLOPURINOL 100 MG: 100 TABLET ORAL at 08:38

## 2025-01-16 RX ADMIN — ENOXAPARIN SODIUM 40 MG: 40 INJECTION SUBCUTANEOUS at 08:45

## 2025-01-16 RX ADMIN — GUAIFENESIN 600 MG: 600 TABLET, EXTENDED RELEASE ORAL at 08:39

## 2025-01-16 RX ADMIN — PREDNISONE 5 MG: 5 TABLET ORAL at 16:57

## 2025-01-16 RX ADMIN — PENTOXIFYLLINE 400 MG: 400 TABLET, EXTENDED RELEASE ORAL at 08:43

## 2025-01-16 RX ADMIN — ACETAMINOPHEN 975 MG: 325 TABLET, FILM COATED ORAL at 21:55

## 2025-01-16 RX ADMIN — SUCRALFATE 1 G: 1 TABLET ORAL at 08:38

## 2025-01-16 RX ADMIN — GABAPENTIN 600 MG: 300 CAPSULE ORAL at 16:57

## 2025-01-16 RX ADMIN — ATORVASTATIN CALCIUM 80 MG: 80 TABLET, FILM COATED ORAL at 16:57

## 2025-01-16 RX ADMIN — LOPERAMIDE HYDROCHLORIDE 2 MG: 2 CAPSULE ORAL at 05:47

## 2025-01-16 RX ADMIN — VENLAFAXINE 25 MG: 25 TABLET ORAL at 08:43

## 2025-01-16 RX ADMIN — DICLOFENAC SODIUM 2 G: 10 GEL TOPICAL at 11:55

## 2025-01-16 RX ADMIN — POTASSIUM CHLORIDE 20 MEQ: 1500 TABLET, EXTENDED RELEASE ORAL at 08:53

## 2025-01-16 RX ADMIN — EMPAGLIFLOZIN 10 MG: 10 TABLET, FILM COATED ORAL at 08:46

## 2025-01-16 RX ADMIN — CLOPIDOGREL BISULFATE 75 MG: 75 TABLET, FILM COATED ORAL at 08:39

## 2025-01-16 RX ADMIN — PANTOPRAZOLE SODIUM 40 MG: 40 TABLET, DELAYED RELEASE ORAL at 08:38

## 2025-01-16 RX ADMIN — METHOCARBAMOL 500 MG: 500 TABLET ORAL at 08:39

## 2025-01-16 RX ADMIN — FINASTERIDE 5 MG: 5 TABLET, FILM COATED ORAL at 08:39

## 2025-01-16 RX ADMIN — TAMSULOSIN HYDROCHLORIDE 0.4 MG: 0.4 CAPSULE ORAL at 16:57

## 2025-01-16 RX ADMIN — DICLOFENAC SODIUM 2 G: 10 GEL TOPICAL at 21:57

## 2025-01-16 RX ADMIN — PENTOXIFYLLINE 400 MG: 400 TABLET, EXTENDED RELEASE ORAL at 16:58

## 2025-01-16 RX ADMIN — FERROUS SULFATE TAB 325 MG (65 MG ELEMENTAL FE) 325 MG: 325 (65 FE) TAB at 08:39

## 2025-01-16 RX ADMIN — PENTOXIFYLLINE 400 MG: 400 TABLET, EXTENDED RELEASE ORAL at 11:54

## 2025-01-16 NOTE — PLAN OF CARE
Problem: PAIN - ADULT  Goal: Verbalizes/displays adequate comfort level or baseline comfort level  Description: Interventions:  - Encourage patient to monitor pain and request assistance  - Assess pain using appropriate pain scale  - Administer analgesics based on type and severity of pain and evaluate response  - Implement non-pharmacological measures as appropriate and evaluate response  - Consider cultural and social influences on pain and pain management  - Notify physician/advanced practitioner if interventions unsuccessful or patient reports new pain  Outcome: Progressing     Problem: INFECTION - ADULT  Goal: Absence or prevention of progression during hospitalization  Description: INTERVENTIONS:  - Assess and monitor for signs and symptoms of infection  - Monitor lab/diagnostic results  - Monitor all insertion sites, i.e. indwelling lines, tubes, and drains  - Monitor endotracheal if appropriate and nasal secretions for changes in amount and color  - [place] appropriate cooling/warming therapies per order  - Administer medications as ordered  - Instruct and encourage patient and family to use good hand hygiene technique  - Identify and instruct in appropriate isolation precautions for identified infection/condition  Outcome: Progressing  Goal: Absence of fever/infection during neutropenic period  Description: INTERVENTIONS:  - Monitor WBC    Outcome: Progressing     Problem: SAFETY ADULT  Goal: Patient will remain free of falls  Description: INTERVENTIONS:  - Educate patient/family on patient safety including physical limitations  - Instruct patient to call for assistance with activity   - Consult OT/PT to assist with strengthening/mobility   - Keep Call bell within reach  - Keep bed low and locked with side rails adjusted as appropriate  - Keep care items and personal belongings within reach  - Initiate and maintain comfort rounds  - Make Fall Risk Sign visible to staff  - Offer Toileting every 2 Hours,  in advance of need  - Initiate/Maintain bed alarm  - Obtain necessary fall risk management equipment: yellow socks, callbell and personal items within reach  - Apply yellow socks and bracelet for high fall risk patients  - Consider moving patient to room near nurses station  Outcome: Progressing  Goal: Maintain or return to baseline ADL function  Description: INTERVENTIONS:  -  Assess patient's ability to carry out ADLs; assess patient's baseline for ADL function and identify physical deficits which impact ability to perform ADLs (bathing, care of mouth/teeth, toileting, grooming, dressing, etc.)  - Assess/evaluate cause of self-care deficits   - Assess range of motion  - Assess patient's mobility; develop plan if impaired  - Assess patient's need for assistive devices and provide as appropriate  - Encourage maximum independence but intervene and supervise when necessary  - Involve family in performance of ADLs  - Assess for home care needs following discharge   - Consider OT consult to assist with ADL evaluation and planning for discharge  - Provide patient education as appropriate  Outcome: Progressing  Goal: Maintains/Returns to pre admission functional level  Description: INTERVENTIONS:  - Perform AM-PAC 6 Click Basic Mobility/ Daily Activity assessment daily.  - Set and communicate daily mobility goal to care team and patient/family/caregiver.   - Collaborate with rehabilitation services on mobility goals if consulted  - Perform Range of Motion 2 times a day.  - Reposition patient every 2 hours.  - Dangle patient 2 times a day  - Stand patient 2 times a day  - Ambulate patient 2 times a day  - Out of bed to chair 3 times a day   - Out of bed for meals 3 times a day  - Out of bed for toileting  - Record patient progress and toleration of activity level   Outcome: Progressing     Problem: DISCHARGE PLANNING  Goal: Discharge to home or other facility with appropriate resources  Description: INTERVENTIONS:  -  Identify barriers to discharge w/patient and caregiver  - Arrange for needed discharge resources and transportation as appropriate  - Identify discharge learning needs (meds, wound care, etc.)  - Arrange for interpretive services to assist at discharge as needed  - Refer to Case Management Department for coordinating discharge planning if the patient needs post-hospital services based on physician/advanced practitioner order or complex needs related to functional status, cognitive ability, or social support system  Outcome: Progressing     Problem: Knowledge Deficit  Goal: Patient/family/caregiver demonstrates understanding of disease process, treatment plan, medications, and discharge instructions  Description: Complete learning assessment and assess knowledge base.  Interventions:  - Provide teaching at level of understanding  - Provide teaching via preferred learning methods  Outcome: Progressing     Problem: Prexisting or High Potential for Compromised Skin Integrity  Goal: Skin integrity is maintained or improved  Description: INTERVENTIONS:  - Identify patients at risk for skin breakdown  - Assess and monitor skin integrity  - Assess and monitor nutrition and hydration status  - Monitor labs   - Assess for incontinence   - Turn and reposition patient  - Assist with mobility/ambulation  - Relieve pressure over bony prominences  - Avoid friction and shearing  - Provide appropriate hygiene as needed including keeping skin clean and dry  - Evaluate need for skin moisturizer/barrier cream  - Collaborate with interdisciplinary team   - Patient/family teaching  - Consider wound care consult   Outcome: Progressing     Problem: Nutrition/Hydration-ADULT  Goal: Nutrient/Hydration intake appropriate for improving, restoring or maintaining nutritional needs  Description: Monitor and assess patient's nutrition/hydration status for malnutrition. Collaborate with interdisciplinary team and initiate plan and interventions as  ordered.  Monitor patient's weight and dietary intake as ordered or per policy. Utilize nutrition screening tool and intervene as necessary. Determine patient's food preferences and provide high-protein, high-caloric foods as appropriate.     INTERVENTIONS:  - Monitor oral intake, urinary output, labs, and treatment plans  - Assess nutrition and hydration status and recommend course of action  - Evaluate amount of meals eaten  - Assist patient with eating if necessary   - Allow adequate time for meals  - Recommend/ encourage appropriate diets, oral nutritional supplements, and vitamin/mineral supplements  - Order, calculate, and assess calorie counts as needed  - Recommend, monitor, and adjust tube feedings and TPN/PPN based on assessed needs  - Assess need for intravenous fluids  - Provide specific nutrition/hydration education as appropriate  - Include patient/family/caregiver in decisions related to nutrition  Outcome: Progressing

## 2025-01-16 NOTE — ASSESSMENT & PLAN NOTE
Per patient since chemotherapy  C diff, bacterial stool panel negative   GI consulted  Additional stool studies with fecal fat, Giardia and stool sodium pending   May need colonoscopy if king not resolve

## 2025-01-16 NOTE — CASE MANAGEMENT
[facility] received request for authorization from Care Manager.  Authorization request submitted for: Acute Rehab  Facility Name: [facility]  NPI: [id number]  Facility MD: Kade Mera  NPI: [id number]  Authorization initiated by contacting insurance:  [employer]  Via: Leap Portal   Clinicals submitted via Leap attachment   Pending Reference #: [id number]    Care Manager notified: Jose David Elias    Updates to authorization status will be noted in chart. Please reach out to CM for updates on any clinical information.

## 2025-01-16 NOTE — ASSESSMENT & PLAN NOTE
Patient was recently admitted to [facility] for failure to thrive and generalized weakness following chemotherapy.  Patient was pending placement to rehab facility when he was noted to have gangrene on the right foot and poor healing wound on the left.  Case was discussed with vascular surgery who recommended transfer to [place].    Patient was seen by both podiatry and vascular surgery, CT of the left lower extremity showed severe disease.  No revascularization options available, recommended right AKA which was done 1/8.  Local wound care of right stump per vascular surgery  Patient has stable wounds on the left LE without clinical signs of infection.  Continue local wound care per podiatry on the left  Weightbearing as tolerated  Continue aspirin, Plavix, statin, Trental.   Acute pain has been following, signing off 1/14.  Continue ATC APAP, Robaxin, gabapentin as needed oxycodone.   PT/OT recommending rehab, case management following.   [facility] Pending auth.

## 2025-01-16 NOTE — CASE MANAGEMENT
Support Center has received INTENT TO DENY for Acute Rehab Authorization.   Insurance: [employer]  Information obtained via fax.  Intent to Deny Reason: lacks support to approve  Facility: [facility]   Pending Auth #: [id number]   Peer to Peer Phone#: [phone]  Deadline: 1/17 @ 12p   to task P2P to [facility].    Please notify Discharge Support if P2P will not be completed.     Care Manager notified: Jose David Elias    Please reach out to  for updates on any clinical information.

## 2025-01-16 NOTE — PROGRESS NOTES
Progress Note - PMR   Name: Royce Rene 79 y.o. male I MRN: [id number]  [facility] I Date of Admission: 1/1/2025   Date of Service: 1/16/2025 I Hospital Day: 15     Assessment & Plan  PAD (peripheral artery disease) (HCC)  Right foot gangrene and poor healing wound on the left  CT left lower extremity showed severe disease  S/p right AKA  Per podiatry continues with progression of ischemic changes to digits.   Patient not in agreement to surgical intervention on the left, plan to monitor while in house for demarcation.   WBAT LLE with local wound care  Continued on ASA/Plavix/statin/trental  Chronic combined systolic and diastolic CHF (congestive heart failure) (HCC)  Wt Readings from Last 3 Encounters:   01/15/25 88.1 kg (194 lb 3.6 oz)   12/26/24 100 kg (220 lb 14.4 oz)   12/13/24 103 kg (227 lb)   Lasix on hold due to poor PO intake/diarrhea  Losartan also on hold    Ischemic cardiomyopathy    Prostate cancer (HCC)  Last chemotherapy 12/12/2024  Current regimen is Cabazitaxel, Neulasta, prednisone, Lupron, Denosumab   Follow up outpt with Dr. Hernandez  CAD (coronary artery disease)  ASA/plavix/beta-blocker/statin  Urinary retention  Moser placed on 1/2/2025  Continue Proscar/flomax  Ambulatory dysfunction  Continue PT/OT while inpatient   Ulcer of left foot, limited to breakdown of skin (HCC)  Has progressive ischemic changed, patient does not want surgical intervention  Plan to monitor for demarcation   WBAT with local wound care  Mixed hyperlipidemia    Diarrhea  Per patient since chemotherapy  C diff, bacterial stool panel negative   GI consulted  Additional stool studies with fecal fat, Giardia and stool sodium pending   May need colonoscopy if king not resolve  Iron deficiency anemia  S/p iron supplementation and transfusion   History of transcatheter aortic valve replacement (TAVR)    Encounter for preoperative assessment for noncoronary cardiac surgery    Severe protein-calorie malnutrition  (HCC)  Malnutrition Findings:   Adult Malnutrition type: Acute illness  Adult Degree of Malnutrition: Other severe protein calorie malnutrition  Malnutrition Characteristics: Inadequate energy, Weight loss                  360 Statement: related to catabolic illness, inadequate energy intake due to nausea, diarrhea, as evidenced by, 10% weight loss last months, intake < 50% estimated energy requirements for > 5 days, dark sunken orbital. Treatment: Regular diet, oral nutrition supplements.    BMI Findings:           Body mass index is 29.53 kg/m².     Nausea    S/P AKA (above knee amputation) unilateral, right (HCC)  Right foot gangrene  S/P right AKA on 1/8/2025  Impaired mobility and activities of daily living  Continue PT/OT while inpatient.  Recommend sub-acute inpatient rehabilitation for longer length of stay and concern he would not tolerate a more aggressive program.     Subjective   The patient was seen in his room. He reports continued diarrhea and reports about 4 episodes today. He reports stool incontinence at baseline at home.  He is having phantom pain and sensation as well. He denies pain in his left foot and appears to have severe neuropathy on exam though he denies this. Per patient his son is never home and can not help him. He states he can not go back to current living situation.     Chief Complaint: f/u amputation    Interval: The patient underwent right AKA. Patient has had ischemic progression for left digits, awaiting demarcation per podiatry.     Objective :  Temp:  [97.7 °F (36.5 °C)-97.9 °F (36.6 °C)] 97.7 °F (36.5 °C)  HR:  [84-90] 84  BP: (116-148)/(59-86) 148/74  Resp:  [15-18] 18  SpO2:  [93 %-97 %] 96 %  O2 Device: None (Room air)    Functional Update:  Mobility: Supervision to moderate assist for bed mobility   Transfers: Maximal assist for transfers   ADLs: Supervision for grooming     Physical Exam  Constitutional:       General: He is not in acute distress.     Appearance: He is  not toxic-appearing.   HENT:      Head: Normocephalic and atraumatic.   Pulmonary:      Effort: Pulmonary effort is normal. No respiratory distress.   Abdominal:      General: There is no distension.   Genitourinary:     Comments: Incontinent of stool.  Musculoskeletal:      Comments: Right AKA and dressing to LLE intact    Neurological:      Mental Status: He is alert.      Comments: Oriented to at least person and place, sever peripheral neuropathy left foot, unable to feel light touch    Psychiatric:         Mood and Affect: Mood normal.         Scheduled Meds:  Current Facility-Administered Medications   Medication Dose Route Frequency Provider Last Rate    acetaminophen  975 mg Oral Q8H [facility] Joshua Amador MD      allopurinol  100 mg Oral Daily Joshua Amador MD      aspirin  81 mg Oral Daily Joshua Amador MD      atorvastatin  80 mg Oral Daily With Dinner Joshua Amador MD      bismuth subsalicylate  524 mg Oral Q6H PRN Joshua Amador MD      clopidogrel  75 mg Oral Daily Joshua Amador MD      Diclofenac Sodium  2 g Topical 4x Daily Joshua Amador MD      diphenoxylate-atropine  1 tablet Oral 4x Daily PRN Estevan Harper DO      Empagliflozin  10 mg Oral QAM Kelli Pennington MD      enoxaparin  40 mg Subcutaneous Daily Joshua Amador MD      ezetimibe  10 mg Oral Daily Joshua Amador MD      famotidine  20 mg Oral Daily Joshua Amador MD      ferrous sulfate  325 mg Oral Daily Joshua Amador MD      finasteride  5 mg Oral Daily Joshua Amador MD      [Held by provider] furosemide  40 mg Oral Daily Joshua Amador MD      gabapentin  600 mg Oral BID Joshua Amador MD      guaiFENesin  600 mg Oral Q12H [facility] Kelli Pennington MD      loperamide  2 mg Oral 4x Daily PRN Joshua Amador MD      [Held by provider] losartan  12.5  mg Oral Daily Joshua Amador MD      menthol-methyl salicylate   Apply externally 4x Daily PRN Joshua Amador MD      methocarbamol  500 mg Oral TID Joshua Amador MD      metoprolol succinate  25 mg Oral Daily Joshua Amador MD      naloxone  0.04 mg Intravenous Q1MIN PRN Joshua Amador MD      ondansetron  4 mg Intravenous Q6H PRN Joshua Amador MD      oxyCODONE  5 mg Oral Q4H PRN Joshua Amador MD      Or    oxyCODONE  10 mg Oral Q4H PRN Joshua Amador MD      pantoprazole  40 mg Oral BID AC Kelli Pennington MD      pentoxifylline  400 mg Oral TID With Meals Joshua Amador MD      potassium chloride  20 mEq Oral Daily Joshua Amador MD      predniSONE  5 mg Oral BID With Meals Joshua Amador MD      simethicone  80 mg Oral Q6H PRN Kelli Pennington MD      sucralfate  1 g Oral 4x Daily (AC & HS) Kelli Pennington MD      tamsulosin  0.4 mg Oral Daily With Dinner Joshua Amador MD      venlafaxine  25 mg Oral Daily Joshua Amador MD           Lab Results: I have reviewed the following results:  Results from last 7 days   Lab Units 01/15/25  0512 01/13/25  1301 01/12/25  0431   HEMOGLOBIN g/dL 9.6* 9.7* 7.3*   HEMATOCRIT % 31.5* 31.1* 25.0*   WBC Thousand/uL 8.76 9.13 9.62   PLATELETS Thousands/uL 450* 448* 438*     Results from last 7 days   Lab Units 01/16/25  0503 01/15/25  0512 01/13/25  1301 01/12/25  0431   BUN mg/dL 20 18 13 9   SODIUM mmol/L 137 139 139 140   POTASSIUM mmol/L 3.4* 3.3* 3.5 3.6   CHLORIDE mmol/L 106 108 106 110*   CREATININE mg/dL 0.72 0.72 0.68 0.68   AST U/L  --   --   --  17   ALT U/L  --   --   --  27

## 2025-01-16 NOTE — ASSESSMENT & PLAN NOTE
Patient with diarrhea since receiving last round of chemotherapy in December 2024.  Also with intermittent nausea/vomiting and overall poor oral intake.    C. difficile, bacterial stool panel negative   Given lack of improvement despite Imodium, GI was consulted.  Additional stool studies with fecal fat, Giardia and stool sodium ordered and pending  If no spontaneous resolution/improvement over the next few weeks, can consider colonoscopy as outpatient.   Will continue loperamide PRN  Added Banatrol flakes BID 1/14  Add Lomotil as needed  Diarrhea improving

## 2025-01-16 NOTE — PROGRESS NOTES
Progress Note - Hospitalist   Name: Royce Rene 79 y.o. male I MRN: [id number]  [facility] I Date of Admission: 1/1/2025   Date of Service: 1/16/2025 I Hospital Day: 15    Assessment & Plan  PAD (peripheral artery disease) (HCC)  Patient was recently admitted to [facility] for failure to thrive and generalized weakness following chemotherapy.  Patient was pending placement to rehab facility when he was noted to have gangrene on the right foot and poor healing wound on the left.  Case was discussed with vascular surgery who recommended transfer to [facility].    Patient was seen by both podiatry and vascular surgery, CT of the left lower extremity showed severe disease.  No revascularization options available, recommended right AKA which was done 1/8.  Local wound care of right stump per vascular surgery  Patient has stable wounds on the left LE without clinical signs of infection.  Continue local wound care per podiatry on the left  Weightbearing as tolerated  Continue aspirin, Plavix, statin, Trental.   Acute pain has been following, signing off 1/14.  Continue ATC APAP, Robaxin, gabapentin as needed oxycodone.   PT/OT recommending rehab, case management following.   [facility] Pending auth.   Ulcer of left foot, limited to breakdown of skin (HCC)  Pic under media.  Low suspicion for active infection  Per podiatry, no surgical plans  Status post angiogram with the intervention previously  Continue with local wound care      Chronic combined systolic and diastolic CHF (congestive heart failure) ([facility])  Wt Readings from Last 3 Encounters:   01/15/25 88.1 kg (194 lb 3.6 oz)   12/26/24 100 kg (220 lb 14.4 oz)   12/13/24 103 kg (227 lb)   Cardiac echo on 8/2/2023 with EF 30 - 35%  Can continue metoprolol, Jardiance.  Resume Lasix and losartan  Low-salt diet  Monitor I/O, daily weights-net fluid balance and weight is down    CAD (coronary artery disease)  Continue aspirin, Plavix, beta-blocker, statin  Urinary  retention  Moser catheter placed 1/2  Continue Proscar/Flomax  Void trial today  Diarrhea  Patient with diarrhea since receiving last round of chemotherapy in December 2024.  Also with intermittent nausea/vomiting and overall poor oral intake.    C. difficile, bacterial stool panel negative   Given lack of improvement despite Imodium, GI was consulted.  Additional stool studies with fecal fat, Giardia and stool sodium ordered and pending  If no spontaneous resolution/improvement over the next few weeks, can consider colonoscopy as outpatient.   Will continue loperamide PRN  Added Banatrol flakes BID 1/14  Add Lomotil as needed  Diarrhea improving  Ambulatory dysfunction  Baseline- close to wheelchair bounded, walks minimally  Status post AKA  Plan for rehab on discharge  Prostate cancer (HCC)  History of prostate cancer follows with oncology receiving chemotherapy (last on 12/12)  Follows with Dr. Hernandez  Current regimen is Cabazitaxel, Neulasta, prednisone, Lupron, denosumab  Continue to follow in outpatient setting    Iron deficiency anemia  Lab Results   Component Value Date    HGB 9.6 (L) 01/15/2025    HGB 9.7 (L) 01/13/2025    HGB 7.3 (L) 01/12/2025    HGB 7.5 (L) 01/10/2025    HGB 8.1 (L) 01/09/2025    CONCFE 10 (L) 12/28/2024    IRON 21 (L) 12/28/2024    FERRITIN 1,012 (H) 12/28/2024    TIBC 214.2 (L) 12/28/2024     Continue iron supplementation  Status post 1 unit PRBC 1/12  Monitor hemoglobin, additional transfusions as needed  Mixed hyperlipidemia  Continue statin  History of transcatheter aortic valve replacement (TAVR)  Noted  Severe protein-calorie malnutrition (HCC)  Malnutrition Findings:   Adult Malnutrition type: Acute illness  Adult Degree of Malnutrition: Other severe protein calorie malnutrition  Malnutrition Characteristics: Inadequate energy, Weight loss                  360 Statement: related to catabolic illness, inadequate energy intake due to nausea, diarrhea, as evidenced by, 10% weight  loss last months, intake < 50% estimated energy requirements for > 5 days, dark sunken orbital. Treatment: Regular diet, oral nutrition supplements.    BMI Findings:           Body mass index is 29.53 kg/m².   Continue with regular diet and nutrition supplement    VTE Pharmacologic Prophylaxis: VTE Score: 6 High Risk (Score >/= 5) - Pharmacological DVT Prophylaxis Ordered: enoxaparin (Lovenox). Sequential Compression Devices Ordered.    Mobility:   Basic Mobility Inpatient Raw Score: 12  JH-HLM Goal: 4: Move to chair/commode  JH-HLM Achieved: 3: Sit at edge of bed  JH-HLM Goal NOT achieved. Continue with multidisciplinary rounding and encourage appropriate mobility to improve upon JH-HLM goals.    Patient Centered Rounds: I performed bedside rounds with nursing staff today.   Discussions with Specialists or Other Care Team Provider: CORKY    Education and Discussions with Family / Patient:  Patient.     Current Length of Stay: 15 day(s)  Current Patient Status: Inpatient   Certification Statement: The patient will continue to require additional inpatient hospital stay due to awaiting rehab placement      Code Status: Level 1 - Full Code    Subjective   Patient seen and examined  Comfortable in bed  Diarrhea is improving   No chest pain or shortness of breath      Objective :  Temp:  [97.7 °F (36.5 °C)-98 °F (36.7 °C)] 98 °F (36.7 °C)  HR:  [81-90] 81  BP: (116-148)/(59-76) 126/76  Resp:  [15-18] 17  SpO2:  [93 %-97 %] 96 %  O2 Device: None (Room air)    Body mass index is 29.53 kg/m².     Input and Output Summary (last 24 hours):     Intake/Output Summary (Last 24 hours) at 1/16/2025 1501  Last data filed at 1/16/2025 1121  Gross per 24 hour   Intake 1060 ml   Output 625 ml   Net 435 ml       Physical Exam  Patient is awake alert in no acute distress  Obese chronically ill-appearing, , pale  Lungs clear to auscultation bilateral anteriorly  Heart positive S1-S2 no murmur  Abdomen soft nontender  Moser catheter in  place  Status post right AKA, dressing in place  Left lower extremity dressing in place    Lines/Drains:      Central Line:  No central line               Lab Results: I have reviewed the following results:   Results from last 7 days   Lab Units 01/15/25  0512   WBC Thousand/uL 8.76   HEMOGLOBIN g/dL 9.6*   HEMATOCRIT % 31.5*   PLATELETS Thousands/uL 450*   SEGS PCT % 81*   LYMPHO PCT % 9*   MONO PCT % 7   EOS PCT % 2     Results from last 7 days   Lab Units 01/16/25  0503 01/13/25  1301 01/12/25  0431   SODIUM mmol/L 137   < > 140   POTASSIUM mmol/L 3.4*   < > 3.6   CHLORIDE mmol/L 106   < > 110*   CO2 mmol/L 22   < > 22   BUN mg/dL 20   < > 9   CREATININE mg/dL 0.72   < > 0.68   ANION GAP mmol/L 9   < > 8   CALCIUM mg/dL 7.5*   < > 8.1*   ALBUMIN g/dL  --   --  2.9*   TOTAL BILIRUBIN mg/dL  --   --  0.40   ALK PHOS U/L  --   --  88   ALT U/L  --   --  27   AST U/L  --   --  17   GLUCOSE RANDOM mg/dL 78   < > 87    < > = values in this interval not displayed.                       Recent Cultures (last 7 days):   Results from last 7 days   Lab Units 01/11/25  1827   C DIFF TOXIN B BY PCR  Negative       Imaging Results Review: No pertinent imaging studies reviewed.  Other Study Results Review: No additional pertinent studies reviewed.    Last 24 Hours Medication List:     Current Facility-Administered Medications:     acetaminophen (TYLENOL) tablet 975 mg, Q8H DEWAYNE    allopurinol (ZYLOPRIM) tablet 100 mg, Daily    aspirin chewable tablet 81 mg, Daily    atorvastatin (LIPITOR) tablet 80 mg, Daily With Dinner    bismuth subsalicylate (PEPTO BISMOL) oral suspension 524 mg, Q6H PRN    clopidogrel (PLAVIX) tablet 75 mg, Daily    Diclofenac Sodium (VOLTAREN) 1 % topical gel 2 g, 4x Daily    diphenoxylate-atropine (LOMOTIL) 2.5-0.025 mg per tablet 1 tablet, 4x Daily PRN    Empagliflozin (JARDIANCE) tablet 10 mg, QAM    enoxaparin (LOVENOX) subcutaneous injection 40 mg, Daily    ezetimibe (ZETIA) tablet 10 mg, Daily     famotidine (PEPCID) tablet 20 mg, Daily    ferrous sulfate tablet 325 mg, Daily    finasteride (PROSCAR) tablet 5 mg, Daily    furosemide (LASIX) tablet 40 mg, Daily    gabapentin (NEURONTIN) capsule 600 mg, BID    guaiFENesin (MUCINEX) 12 hr tablet 600 mg, Q12H DEWAYNE    loperamide (IMODIUM) capsule 2 mg, 4x Daily PRN    losartan (COZAAR) tablet 12.5 mg, Daily    menthol-methyl salicylate (BENGAY) 10-15 % cream, 4x Daily PRN    methocarbamol (ROBAXIN) tablet 500 mg, TID    metoprolol succinate (TOPROL-XL) 24 hr tablet 25 mg, Daily    naloxone (NARCAN) 0.04 mg/mL syringe 0.04 mg, Q1MIN PRN    ondansetron (ZOFRAN) injection 4 mg, Q6H PRN    oxyCODONE (ROXICODONE) IR tablet 5 mg, Q4H PRN **OR** oxyCODONE (ROXICODONE) immediate release tablet 10 mg, Q4H PRN    pantoprazole (PROTONIX) EC tablet 40 mg, BID AC    pentoxifylline (TRENtal) ER tablet 400 mg, TID With Meals    [START ON 1/17/2025] potassium chloride (Klor-Con M20) CR tablet 40 mEq, Daily    predniSONE tablet 5 mg, BID With Meals    simethicone (MYLICON) chewable tablet 80 mg, Q6H PRN    sucralfate (CARAFATE) tablet 1 g, 4x Daily (AC & HS)    tamsulosin (FLOMAX) capsule 0.4 mg, Daily With Dinner    venlafaxine (EFFEXOR) tablet 25 mg, Daily    Administrative Statements   Today, Patient Was Seen By: Estevan Harper DO      **Please Note: This note may have been constructed using a voice recognition system.**

## 2025-01-16 NOTE — ASSESSMENT & PLAN NOTE
Wt Readings from Last 3 Encounters:   01/15/25 88.1 kg (194 lb 3.6 oz)   12/26/24 100 kg (220 lb 14.4 oz)   12/13/24 103 kg (227 lb)   Lasix on hold due to poor PO intake/diarrhea  Losartan also on hold

## 2025-01-16 NOTE — PROGRESS NOTES
Podiatry - Progress Note  Patient: Royce Rene 79 y.o. male   MRN: [id number]  PCP: Anne Chandra MD  Unit/Bed#: [facility] 523-01 Encounter: [id number]  Date Of Visit: 01/16/25    ASSESSMENT:    Royce Rene is a 79 y.o. male with:    Right forefoot wet gangrene, stable, Poole 4   -S/p right AKA DOS 1/8/2025  Left foot dry gangrene, Poole stage 4  Ischemic pain to bilateral lower extremities  Ulceration left lateral midfoot   Severe peripheral vascular disease      PLAN:    Patient seen and evaluated bedside with all questions and concerns addressed.  Left foot eschars are stable with no acute clinical signs of infection, however continued progression of ischemic changes to digits.  Will continue to monitor for demarcation while in-house, continue local wound care.  No indication for acute podiatric surgical intervention at this time.  Lengthy discussion had at bedside with patient regarding continued ischemic changes to the left digits.  Patient not amenable to surgical intervention at this time, would like to continue with conservative local wound care.  It was explained to the patient that several digits of the left foot are likely nonviable, and that he may need surgical intervention at some point, particularly if the digits were to turn wet or become infected.  Patient indicates his understanding  Left lower extremity with inline PT flow.  Optimized per vascular, no further interventions planned  Labs and vitals reviewed.  Patient is afebrile with no noted leukocytosis.  Will continue to trend labs and vitals while in-patient  Continue local wound care, appreciate nursing assistance with dressing changes.  Elevation and offloading on green foam wedges or pillows when non-ambulatory.  Rest of care per primary team.     Weightbearing status: Weightbearing as tolerated    SUBJECTIVE:     The patient was seen, evaluated, and assessed at bedside today. The patient was awake, alert, and in no acute distress. No acute  events overnight. The patient reports some pain in his right AKA stump that he believes was due to a tight dressing.  Patient denies pain in the left lower extremity. Patient denies N/V/F/chills/SOB/CP.      OBJECTIVE:     Vitals:   /74   Pulse 84   Temp 97.7 °F (36.5 °C) (Oral)   Resp 18   Wt 88.1 kg (194 lb 3.6 oz)   SpO2 96%   BMI 29.53 kg/m²     Temp (24hrs), Av.8 °F (36.6 °C), Min:97.7 °F (36.5 °C), Max:97.9 °F (36.6 °C)      Physical Exam:     Lungs: Non labored breathing  Abdomen: Soft, non-tender.  Lower Extremity:  Cardiovascular status at baseline from admission.  Neurological status at baseline from admission.  Musculoskeletal status at baseline from admission. No calf tenderness noted.     Left lower extremity: Dry, stable, well adhered eschars to lateral aspect of midfoot, hallux.  Dry gangrenous changes to digits 2, 3, 4.  No acute clinical signs of infection.  No drainage, purulence, malodor, crepitus, fluctuance, localized or ascending erythema appreciated    Clinical Images 25:                    Additional Data:     Labs:    Results from last 7 days   Lab Units 01/15/25  0512   WBC Thousand/uL 8.76   HEMOGLOBIN g/dL 9.6*   HEMATOCRIT % 31.5*   PLATELETS Thousands/uL 450*   SEGS PCT % 81*   LYMPHO PCT % 9*   MONO PCT % 7   EOS PCT % 2     Results from last 7 days   Lab Units 25  0503 25  1301 25  0431   POTASSIUM mmol/L 3.4*   < > 3.6   CHLORIDE mmol/L 106   < > 110*   CO2 mmol/L 22   < > 22   BUN mg/dL 20   < > 9   CREATININE mg/dL 0.72   < > 0.68   CALCIUM mg/dL 7.5*   < > 8.1*   ALK PHOS U/L  --   --  88   ALT U/L  --   --  27   AST U/L  --   --  17    < > = values in this interval not displayed.           * I Have Reviewed All Lab Data Listed Above.    Recent Cultures (last 7 days):     Results from last 7 days   Lab Units 25  1827   C DIFF TOXIN B BY PCR  Negative           Imaging: I have personally reviewed pertinent films in PACS  EKG, Pathology,  "and Other Studies: I have personally reviewed pertinent reports.    ** Please Note: Portions of the record may have been created with voice recognition software. Occasional wrong word or \"sound a like\" substitutions may have occurred due to the inherent limitations of voice recognition software. Read the chart carefully and recognize, using context, where substitutions have occurred. **      "

## 2025-01-16 NOTE — ASSESSMENT & PLAN NOTE
Continue PT/OT while inpatient.  Recommend sub-acute inpatient rehabilitation for longer length of stay and concern he would not tolerate a more aggressive program.

## 2025-01-16 NOTE — ASSESSMENT & PLAN NOTE
Malnutrition Findings:   Adult Malnutrition type: Acute illness  Adult Degree of Malnutrition: Other severe protein calorie malnutrition  Malnutrition Characteristics: Inadequate energy, Weight loss                  360 Statement: related to catabolic illness, inadequate energy intake due to nausea, diarrhea, as evidenced by, 10% weight loss last months, intake < 50% estimated energy requirements for > 5 days, dark sunken orbital. Treatment: Regular diet, oral nutrition supplements.    BMI Findings:           Body mass index is 29.53 kg/m².   Continue with regular diet and nutrition supplement

## 2025-01-16 NOTE — ASSESSMENT & PLAN NOTE
Last chemotherapy 12/12/2024  Current regimen is Cabazitaxel, Neulasta, prednisone, Lupron, Denosumab   Follow up outpt with Dr. Hernandez

## 2025-01-16 NOTE — ASSESSMENT & PLAN NOTE
Has progressive ischemic changed, patient does not want surgical intervention  Plan to monitor for demarcation   WBAT with local wound care

## 2025-01-16 NOTE — ASSESSMENT & PLAN NOTE
Right foot gangrene and poor healing wound on the left  CT left lower extremity showed severe disease  S/p right AKA  Per podiatry continues with progression of ischemic changes to digits.   Patient not in agreement to surgical intervention on the left, plan to monitor while in house for demarcation.   WBAT LLE with local wound care  Continued on ASA/Plavix/statin/trental

## 2025-01-16 NOTE — ASSESSMENT & PLAN NOTE
Wt Readings from Last 3 Encounters:   01/15/25 88.1 kg (194 lb 3.6 oz)   12/26/24 100 kg (220 lb 14.4 oz)   12/13/24 103 kg (227 lb)   Cardiac echo on 8/2/2023 with EF 30 - 35%  Can continue metoprolol, Jardiance.  Resume Lasix and losartan  Low-salt diet  Monitor I/O, daily weights-net fluid balance and weight is down

## 2025-01-17 LAB
FAT STL QL: NORMAL
NEUTRAL FAT STL QL: NORMAL
SODIUM STL-SCNC: 92 MMOL/L

## 2025-01-17 PROCEDURE — 99232 SBSQ HOSP IP/OBS MODERATE 35: CPT | Performed by: STUDENT IN AN ORGANIZED HEALTH CARE EDUCATION/TRAINING PROGRAM

## 2025-01-17 RX ORDER — HYDROMORPHONE HCL/PF 1 MG/ML
0.5 SYRINGE (ML) INJECTION ONCE
Status: DISCONTINUED | OUTPATIENT
Start: 2025-01-17 | End: 2025-01-24 | Stop reason: HOSPADM

## 2025-01-17 RX ORDER — HYDROMORPHONE HCL/PF 1 MG/ML
0.5 SYRINGE (ML) INJECTION ONCE
Status: COMPLETED | OUTPATIENT
Start: 2025-01-17 | End: 2025-01-17

## 2025-01-17 RX ADMIN — FUROSEMIDE 40 MG: 40 TABLET ORAL at 09:20

## 2025-01-17 RX ADMIN — TAMSULOSIN HYDROCHLORIDE 0.4 MG: 0.4 CAPSULE ORAL at 16:39

## 2025-01-17 RX ADMIN — HYDROMORPHONE HYDROCHLORIDE 0.5 MG: 1 INJECTION, SOLUTION INTRAMUSCULAR; INTRAVENOUS; SUBCUTANEOUS at 05:52

## 2025-01-17 RX ADMIN — OXYCODONE HYDROCHLORIDE 10 MG: 10 TABLET ORAL at 04:32

## 2025-01-17 RX ADMIN — DICLOFENAC SODIUM 2 G: 10 GEL TOPICAL at 17:07

## 2025-01-17 RX ADMIN — METHOCARBAMOL 500 MG: 500 TABLET ORAL at 09:20

## 2025-01-17 RX ADMIN — LOSARTAN POTASSIUM 12.5 MG: 25 TABLET, FILM COATED ORAL at 09:14

## 2025-01-17 RX ADMIN — ACETAMINOPHEN 975 MG: 325 TABLET, FILM COATED ORAL at 21:36

## 2025-01-17 RX ADMIN — SUCRALFATE 1 G: 1 TABLET ORAL at 11:49

## 2025-01-17 RX ADMIN — ENOXAPARIN SODIUM 40 MG: 40 INJECTION SUBCUTANEOUS at 09:23

## 2025-01-17 RX ADMIN — PANTOPRAZOLE SODIUM 40 MG: 40 TABLET, DELAYED RELEASE ORAL at 05:21

## 2025-01-17 RX ADMIN — METHOCARBAMOL 500 MG: 500 TABLET ORAL at 21:36

## 2025-01-17 RX ADMIN — PENTOXIFYLLINE 400 MG: 400 TABLET, EXTENDED RELEASE ORAL at 09:25

## 2025-01-17 RX ADMIN — ONDANSETRON 4 MG: 2 INJECTION INTRAMUSCULAR; INTRAVENOUS at 11:57

## 2025-01-17 RX ADMIN — SUCRALFATE 1 G: 1 TABLET ORAL at 16:39

## 2025-01-17 RX ADMIN — DICLOFENAC SODIUM 2 G: 10 GEL TOPICAL at 09:23

## 2025-01-17 RX ADMIN — ALLOPURINOL 100 MG: 100 TABLET ORAL at 09:20

## 2025-01-17 RX ADMIN — CLOPIDOGREL BISULFATE 75 MG: 75 TABLET, FILM COATED ORAL at 09:16

## 2025-01-17 RX ADMIN — FINASTERIDE 5 MG: 5 TABLET, FILM COATED ORAL at 09:20

## 2025-01-17 RX ADMIN — GUAIFENESIN 600 MG: 600 TABLET, EXTENDED RELEASE ORAL at 21:36

## 2025-01-17 RX ADMIN — VENLAFAXINE 25 MG: 25 TABLET ORAL at 09:25

## 2025-01-17 RX ADMIN — METHOCARBAMOL 500 MG: 500 TABLET ORAL at 16:39

## 2025-01-17 RX ADMIN — ACETAMINOPHEN 975 MG: 325 TABLET, FILM COATED ORAL at 13:11

## 2025-01-17 RX ADMIN — FERROUS SULFATE TAB 325 MG (65 MG ELEMENTAL FE) 325 MG: 325 (65 FE) TAB at 09:20

## 2025-01-17 RX ADMIN — GABAPENTIN 600 MG: 300 CAPSULE ORAL at 17:03

## 2025-01-17 RX ADMIN — FAMOTIDINE 20 MG: 20 TABLET, FILM COATED ORAL at 09:15

## 2025-01-17 RX ADMIN — SUCRALFATE 1 G: 1 TABLET ORAL at 06:45

## 2025-01-17 RX ADMIN — PENTOXIFYLLINE 400 MG: 400 TABLET, EXTENDED RELEASE ORAL at 17:03

## 2025-01-17 RX ADMIN — METOPROLOL SUCCINATE 25 MG: 25 TABLET, EXTENDED RELEASE ORAL at 09:19

## 2025-01-17 RX ADMIN — POTASSIUM CHLORIDE 40 MEQ: 1500 TABLET, EXTENDED RELEASE ORAL at 09:20

## 2025-01-17 RX ADMIN — OXYCODONE HYDROCHLORIDE 10 MG: 10 TABLET ORAL at 13:11

## 2025-01-17 RX ADMIN — LOPERAMIDE HYDROCHLORIDE 2 MG: 2 CAPSULE ORAL at 11:58

## 2025-01-17 RX ADMIN — PREDNISONE 5 MG: 5 TABLET ORAL at 16:40

## 2025-01-17 RX ADMIN — ATORVASTATIN CALCIUM 80 MG: 80 TABLET, FILM COATED ORAL at 16:39

## 2025-01-17 RX ADMIN — ACETAMINOPHEN 975 MG: 325 TABLET, FILM COATED ORAL at 05:21

## 2025-01-17 RX ADMIN — GUAIFENESIN 600 MG: 600 TABLET, EXTENDED RELEASE ORAL at 09:15

## 2025-01-17 RX ADMIN — PREDNISONE 5 MG: 5 TABLET ORAL at 09:29

## 2025-01-17 RX ADMIN — SUCRALFATE 1 G: 1 TABLET ORAL at 21:36

## 2025-01-17 RX ADMIN — GABAPENTIN 600 MG: 300 CAPSULE ORAL at 09:16

## 2025-01-17 RX ADMIN — EMPAGLIFLOZIN 10 MG: 10 TABLET, FILM COATED ORAL at 09:25

## 2025-01-17 RX ADMIN — EZETIMIBE 10 MG: 10 TABLET ORAL at 09:20

## 2025-01-17 RX ADMIN — ASPIRIN 81 MG CHEWABLE TABLET 81 MG: 81 TABLET CHEWABLE at 09:16

## 2025-01-17 RX ADMIN — PANTOPRAZOLE SODIUM 40 MG: 40 TABLET, DELAYED RELEASE ORAL at 16:39

## 2025-01-17 NOTE — ASSESSMENT & PLAN NOTE
Wt Readings from Last 3 Encounters:   01/17/25 90.6 kg (199 lb 11.8 oz)   12/26/24 100 kg (220 lb 14.4 oz)   12/13/24 103 kg (227 lb)   Cardiac echo on 8/2/2023 with EF 30 - 35%  Can continue metoprolol, Jardiance, lasix, losartan  Daily weights, I/Os

## 2025-01-17 NOTE — CASE MANAGEMENT
[facility] has received DENIAL for Acute Rehab Authorization.   Insurance: [employer]  Denial obtained via portal.  Denial Reason:  does not meet CMS guidelines  Facility: [facility]   Denial #: [id number]   Appeal P#  [phone] /   Appeal F# [phone]    Care Manager notified: Jose David Elias    Please reach out to CM for updates on any clinical information.

## 2025-01-17 NOTE — PHYSICAL THERAPY NOTE
Physical Therapy Cancellation Note             01/17/25 1330   PT Last Visit   PT Visit Date 01/17/25   Note Type   Note Type Cancelled Session   Cancel Reasons Refusal     PT attempted treatment session x2, patient adamantly refused, attempted again in the afternoon; continues to refuse at this time due to continued nausea and fatigue. PT educated on importance of functional mobility following AKA during hospital stay; patient continues to refuse. PT will continue to follow as able and appropriate.     Krya Ross PT, DPT

## 2025-01-17 NOTE — PROGRESS NOTES
Progress Note - Hospitalist   Name: Royce Rene 79 y.o. male I MRN: [id number]  [facility] I Date of Admission: 1/1/2025   Date of Service: 1/17/2025 I Hospital Day: 16    Assessment & Plan  PAD (peripheral artery disease) (HCC)  Patient was recently admitted to [facility] for failure to thrive and generalized weakness following chemotherapy.  Patient was pending placement to rehab facility when he was noted to have gangrene on the right foot and poor healing wound on the left.  Case was discussed with vascular surgery who recommended transfer to [facility].    Patient was seen by both podiatry and vascular surgery, CT of the left lower extremity showed severe disease.  No revascularization options available, recommended right AKA which was done 1/8.  Local wound care of right stump per vascular surgery  Patient has stable wounds on the left LE without clinical signs of infection.  Continue local wound care per podiatry on the left  Weightbearing as tolerated  Continue aspirin, Plavix, statin, Trental.   Acute pain has been following, signing off 1/14.  Continue ATC APAP, Robaxin, gabapentin as needed oxycodone.   PT/OT recommending rehab, case management following.   [facility] Pending auth.   Ulcer of left foot, limited to breakdown of skin (HCC)  Pic under media.  Low suspicion for active infection  Per podiatry, no surgical plans  Status post angiogram with the intervention previously  Continue with local wound care    Chronic combined systolic and diastolic CHF (congestive heart failure) ([facility])  Wt Readings from Last 3 Encounters:   01/17/25 90.6 kg (199 lb 11.8 oz)   12/26/24 100 kg (220 lb 14.4 oz)   12/13/24 103 kg (227 lb)   Cardiac echo on 8/2/2023 with EF 30 - 35%  Can continue metoprolol, Jardiance, lasix, losartan  Daily weights, I/Os  CAD (coronary artery disease)  Continue aspirin, Plavix, beta-blocker, statin  Urinary retention  Moser catheter placed 1/2  Continue Proscar/Flomax  S/p voiding  trial, urinary retention protocol  Diarrhea  Patient with diarrhea since receiving last round of chemotherapy in December 2024.  Also with intermittent nausea/vomiting and overall poor oral intake.    C. difficile, bacterial stool panel negative   Given lack of improvement despite Imodium, GI planning outpatient follow-up to discuss colonoscopy and evaluation of elevated fecal calprotectin  Loperamide PRN, lomotil    Ambulatory dysfunction  Baseline- close to wheelchair bounded, walks minimally  Status post AKA  Plan for rehab on discharge  Prostate cancer (HCC)  History of prostate cancer follows with oncology receiving chemotherapy (last on 12/12)  Follows with Dr. Hernandez  Current regimen is Cabazitaxel, Neulasta, prednisone, Lupron, denosumab  Continue to follow in outpatient setting    Iron deficiency anemia    Results from last 7 days   Lab Units 01/15/25  0512 01/13/25  1301 01/12/25  0431   HEMOGLOBIN g/dL 9.6* 9.7* 7.3*   MCV fL 93 92 94   RDW % 15.6* 15.9* 14.9     Stable at this time, no indication for transfusion. He is status post 1 unit PRBC 1/12  Mixed hyperlipidemia  Continue statin  History of transcatheter aortic valve replacement (TAVR)  Noted  Severe protein-calorie malnutrition (HCC)  Malnutrition Findings:   Adult Malnutrition type: Acute illness  Adult Degree of Malnutrition: Other severe protein calorie malnutrition  Malnutrition Characteristics: Inadequate energy, Weight loss                  360 Statement: related to catabolic illness, inadequate energy intake due to nausea, diarrhea, as evidenced by, 10% weight loss last months, intake < 50% estimated energy requirements for > 5 days, dark sunken orbital. Treatment: Regular diet, oral nutrition supplements.    BMI Findings:           Body mass index is 30.37 kg/m².   Continue with regular diet and nutrition supplement    VTE Pharmacologic Prophylaxis: VTE Score: 6 Moderate Risk (Score 3-4) - Pharmacological DVT Prophylaxis Ordered:  enoxaparin (Lovenox).    Mobility:   Basic Mobility Inpatient Raw Score: 12  -[facility] Goal: 4: Move to chair/commode  -HL Achieved: 1: Laying in bed    Discussions with Specialists or Other Care Team Provider: case management    Education and Discussions with Family / Patient: patient    Current Length of Stay: 16 day(s)  Current Patient Status: Inpatient   Certification Statement: The patient will continue to require additional inpatient hospital stay due to awaiting safe discharge plan to rehab  Discharge Plan: 24 hours    Code Status: Level 1 - Full Code    Subjective   Patient seen and examined. No new complaints overnight.     Objective   Vitals:   Temp (24hrs), Av.7 °F (36.5 °C), Min:97.5 °F (36.4 °C), Max:97.8 °F (36.6 °C)    Temp:  [97.5 °F (36.4 °C)-97.8 °F (36.6 °C)] 97.6 °F (36.4 °C)  HR:  [81-88] 81  Resp:  [18] 18  BP: (113-148)/(60-77) 113/66  SpO2:  [96 %-97 %] 97 %  Body mass index is 30.37 kg/m².     Input and Output Summary (last 24 hours):     Intake/Output Summary (Last 24 hours) at 2025 1645  Last data filed at 2025 1303  Gross per 24 hour   Intake 360 ml   Output 1275 ml   Net -915 ml       Physical Exam  Vitals reviewed.   HENT:      Head: Normocephalic.      Nose: Nose normal.      Mouth/Throat:      Mouth: Mucous membranes are moist.   Eyes:      General: No scleral icterus.  Cardiovascular:      Rate and Rhythm: Normal rate and regular rhythm.   Pulmonary:      Effort: Pulmonary effort is normal. No respiratory distress.   Abdominal:      General: There is no distension.      Palpations: Abdomen is soft.      Tenderness: There is no abdominal tenderness.   Skin:     General: Skin is warm.   Neurological:      Mental Status: He is alert.   Psychiatric:         Mood and Affect: Mood normal.         Behavior: Behavior normal.       Lines/Drains:               Lab Results: I have reviewed the following results:   Results from last 7 days   Lab Units 01/15/25  0512 25  1301  01/12/25  0431   WBC Thousand/uL 8.76 9.13 9.62   HEMOGLOBIN g/dL 9.6* 9.7* 7.3*   PLATELETS Thousands/uL 450* 448* 438*   MCV fL 93 92 94     Results from last 7 days   Lab Units 01/16/25  0503 01/15/25  0512 01/13/25  1301 01/12/25  0431   SODIUM mmol/L 137 139 139 140   POTASSIUM mmol/L 3.4* 3.3* 3.5 3.6   CHLORIDE mmol/L 106 108 106 110*   CO2 mmol/L 22 21 22 22   ANION GAP mmol/L 9 10 11 8   BUN mg/dL 20 18 13 9   CREATININE mg/dL 0.72 0.72 0.68 0.68   CALCIUM mg/dL 7.5* 7.7* 7.9* 8.1*   ALBUMIN g/dL  --   --   --  2.9*   TOTAL BILIRUBIN mg/dL  --   --   --  0.40   ALK PHOS U/L  --   --   --  88   ALT U/L  --   --   --  27   AST U/L  --   --   --  17   EGFR ml/min/1.73sq m 88 88 90 90   GLUCOSE RANDOM mg/dL 78 78 87 87     Results from last 7 days   Lab Units 01/15/25  0512   MAGNESIUM mg/dL 2.0                                    Recent Cultures (last 7 days):   Results from last 7 days   Lab Units 01/11/25  1827   C DIFF TOXIN B BY PCR  Negative       Imaging:  Reviewed radiology reports from this admission: imaging since admission    Last 24 Hours Medication List:     Current Facility-Administered Medications:     acetaminophen (TYLENOL) tablet 975 mg, Q8H DEWAYNE    allopurinol (ZYLOPRIM) tablet 100 mg, Daily    aspirin chewable tablet 81 mg, Daily    atorvastatin (LIPITOR) tablet 80 mg, Daily With Dinner    bismuth subsalicylate (PEPTO BISMOL) oral suspension 524 mg, Q6H PRN    clopidogrel (PLAVIX) tablet 75 mg, Daily    Diclofenac Sodium (VOLTAREN) 1 % topical gel 2 g, 4x Daily    diphenoxylate-atropine (LOMOTIL) 2.5-0.025 mg per tablet 1 tablet, 4x Daily PRN    Empagliflozin (JARDIANCE) tablet 10 mg, QAM    enoxaparin (LOVENOX) subcutaneous injection 40 mg, Daily    ezetimibe (ZETIA) tablet 10 mg, Daily    famotidine (PEPCID) tablet 20 mg, Daily    ferrous sulfate tablet 325 mg, Daily    finasteride (PROSCAR) tablet 5 mg, Daily    furosemide (LASIX) tablet 40 mg, Daily    gabapentin (NEURONTIN) capsule 600  mg, BID    guaiFENesin (MUCINEX) 12 hr tablet 600 mg, Q12H DEWAYNE    HYDROmorphone (DILAUDID) injection 0.5 mg, Once    loperamide (IMODIUM) capsule 2 mg, 4x Daily PRN    losartan (COZAAR) tablet 12.5 mg, Daily    menthol-methyl salicylate (BENGAY) 10-15 % cream, 4x Daily PRN    methocarbamol (ROBAXIN) tablet 500 mg, TID    metoprolol succinate (TOPROL-XL) 24 hr tablet 25 mg, Daily    naloxone (NARCAN) 0.04 mg/mL syringe 0.04 mg, Q1MIN PRN    ondansetron (ZOFRAN) injection 4 mg, Q6H PRN    oxyCODONE (ROXICODONE) IR tablet 5 mg, Q4H PRN **OR** oxyCODONE (ROXICODONE) immediate release tablet 10 mg, Q4H PRN    pantoprazole (PROTONIX) EC tablet 40 mg, BID AC    pentoxifylline (TRENtal) ER tablet 400 mg, TID With Meals    potassium chloride (Klor-Con M20) CR tablet 40 mEq, Daily    predniSONE tablet 5 mg, BID With Meals    simethicone (MYLICON) chewable tablet 80 mg, Q6H PRN    sucralfate (CARAFATE) tablet 1 g, 4x Daily (AC & HS)    tamsulosin (FLOMAX) capsule 0.4 mg, Daily With Dinner    venlafaxine (EFFEXOR) tablet 25 mg, Daily      **Please Note: This note may have been constructed using a voice recognition system.**

## 2025-01-17 NOTE — OCCUPATIONAL THERAPY NOTE
Occupational Therapy refusal        Patient Name: Royce Rene  Today's Date: 1/17/2025 01/17/25 1335   OT Last Visit   OT Visit Date 01/17/25   Note Type   Note Type Cancelled Session   Cancel Reasons Refusal         BELKIS Li, OTR/L

## 2025-01-17 NOTE — ASSESSMENT & PLAN NOTE
Malnutrition Findings:   Adult Malnutrition type: Acute illness  Adult Degree of Malnutrition: Other severe protein calorie malnutrition  Malnutrition Characteristics: Inadequate energy, Weight loss                  360 Statement: related to catabolic illness, inadequate energy intake due to nausea, diarrhea, as evidenced by, 10% weight loss last months, intake < 50% estimated energy requirements for > 5 days, dark sunken orbital. Treatment: Regular diet, oral nutrition supplements.    BMI Findings:           Body mass index is 30.37 kg/m².   Continue with regular diet and nutrition supplement

## 2025-01-17 NOTE — PLAN OF CARE
Problem: PAIN - ADULT  Goal: Verbalizes/displays adequate comfort level or baseline comfort level  Description: Interventions:  - Encourage patient to monitor pain and request assistance  - Assess pain using appropriate pain scale  - Administer analgesics based on type and severity of pain and evaluate response  - Implement non-pharmacological measures as appropriate and evaluate response  - Consider cultural and social influences on pain and pain management  - Notify physician/advanced practitioner if interventions unsuccessful or patient reports new pain  Outcome: Progressing     Problem: INFECTION - ADULT  Goal: Absence or prevention of progression during hospitalization  Description: INTERVENTIONS:  - Assess and monitor for signs and symptoms of infection  - Monitor lab/diagnostic results  - Monitor all insertion sites, i.e. indwelling lines, tubes, and drains  - Monitor endotracheal if appropriate and nasal secretions for changes in amount and color  - [place] appropriate cooling/warming therapies per order  - Administer medications as ordered  - Instruct and encourage patient and family to use good hand hygiene technique  - Identify and instruct in appropriate isolation precautions for identified infection/condition  Outcome: Progressing  Goal: Absence of fever/infection during neutropenic period  Description: INTERVENTIONS:  - Monitor WBC    Outcome: Progressing     Problem: SAFETY ADULT  Goal: Patient will remain free of falls  Description: INTERVENTIONS:  - Educate patient/family on patient safety including physical limitations  - Instruct patient to call for assistance with activity   - Consult OT/PT to assist with strengthening/mobility   - Keep Call bell within reach  - Keep bed low and locked with side rails adjusted as appropriate  - Keep care items and personal belongings within reach  - Initiate and maintain comfort rounds  - Make Fall Risk Sign visible to staff  - Offer Toileting every  Hours,  in advance of need  - Initiate/Maintain alarm  - Obtain necessary fall risk management equipment:   - Apply yellow socks and bracelet for high fall risk patients  - Consider moving patient to room near nurses station  Outcome: Progressing  Goal: Maintain or return to baseline ADL function  Description: INTERVENTIONS:  -  Assess patient's ability to carry out ADLs; assess patient's baseline for ADL function and identify physical deficits which impact ability to perform ADLs (bathing, care of mouth/teeth, toileting, grooming, dressing, etc.)  - Assess/evaluate cause of self-care deficits   - Assess range of motion  - Assess patient's mobility; develop plan if impaired  - Assess patient's need for assistive devices and provide as appropriate  - Encourage maximum independence but intervene and supervise when necessary  - Involve family in performance of ADLs  - Assess for home care needs following discharge   - Consider OT consult to assist with ADL evaluation and planning for discharge  - Provide patient education as appropriate  Outcome: Progressing  Goal: Maintains/Returns to pre admission functional level  Description: INTERVENTIONS:  - Perform AM-PAC 6 Click Basic Mobility/ Daily Activity assessment daily.  - Set and communicate daily mobility goal to care team and patient/family/caregiver.   - Collaborate with rehabilitation services on mobility goals if consulted  - Perform Range of Motion  times a day.  - Reposition patient every  hours.  - Dangle patient  times a day  - Stand patient  times a day  - Ambulate patient  times a day  - Out of bed to chair  times a day   - Out of bed for meals  times a day  - Out of bed for toileting  - Record patient progress and toleration of activity level   Outcome: Progressing     Problem: DISCHARGE PLANNING  Goal: Discharge to home or other facility with appropriate resources  Description: INTERVENTIONS:  - Identify barriers to discharge w/patient and caregiver  - Arrange for  needed discharge resources and transportation as appropriate  - Identify discharge learning needs (meds, wound care, etc.)  - Arrange for interpretive services to assist at discharge as needed  - Refer to Case Management Department for coordinating discharge planning if the patient needs post-hospital services based on physician/advanced practitioner order or complex needs related to functional status, cognitive ability, or social support system  Outcome: Progressing     Problem: Knowledge Deficit  Goal: Patient/family/caregiver demonstrates understanding of disease process, treatment plan, medications, and discharge instructions  Description: Complete learning assessment and assess knowledge base.  Interventions:  - Provide teaching at level of understanding  - Provide teaching via preferred learning methods  Outcome: Progressing     Problem: Prexisting or High Potential for Compromised Skin Integrity  Goal: Skin integrity is maintained or improved  Description: INTERVENTIONS:  - Identify patients at risk for skin breakdown  - Assess and monitor skin integrity  - Assess and monitor nutrition and hydration status  - Monitor labs   - Assess for incontinence   - Turn and reposition patient  - Assist with mobility/ambulation  - Relieve pressure over bony prominences  - Avoid friction and shearing  - Provide appropriate hygiene as needed including keeping skin clean and dry  - Evaluate need for skin moisturizer/barrier cream  - Collaborate with interdisciplinary team   - Patient/family teaching  - Consider wound care consult   Outcome: Progressing     Problem: Nutrition/Hydration-ADULT  Goal: Nutrient/Hydration intake appropriate for improving, restoring or maintaining nutritional needs  Description: Monitor and assess patient's nutrition/hydration status for malnutrition. Collaborate with interdisciplinary team and initiate plan and interventions as ordered.  Monitor patient's weight and dietary intake as ordered or  per policy. Utilize nutrition screening tool and intervene as necessary. Determine patient's food preferences and provide high-protein, high-caloric foods as appropriate.     INTERVENTIONS:  - Monitor oral intake, urinary output, labs, and treatment plans  - Assess nutrition and hydration status and recommend course of action  - Evaluate amount of meals eaten  - Assist patient with eating if necessary   - Allow adequate time for meals  - Recommend/ encourage appropriate diets, oral nutritional supplements, and vitamin/mineral supplements  - Order, calculate, and assess calorie counts as needed  - Recommend, monitor, and adjust tube feedings and TPN/PPN based on assessed needs  - Assess need for intravenous fluids  - Provide specific nutrition/hydration education as appropriate  - Include patient/family/caregiver in decisions related to nutrition  Outcome: Progressing

## 2025-01-17 NOTE — CASE MANAGEMENT
Case Management Progress Note    Patient name Royce BAER Free  Location [facility] 523/[facility] 523-01 MRN [id number]  : 1945 Date 2025       LOS (days): 16  Geometric Mean LOS (GMLOS) (days):   Days to GMLOS:        Pt's LOS is 16 days. Pt is medically stable and ready for d/c since 25. Pt was admitted on 25 for Peripheral Artery Disease in his Right Leg and is now S/P Right AKA on 25.    Pt was recommended for acute rehab placement for his aftercare plan. Pt was referred to and accepted for placement at [facility] located in [place], [place]. CM submitted for authorization for acute rehab placement from pt's [employer]-replacement insurance plan on 25. Pt's authorization request was denied by insurance on same day. A [id number] appeal was performed on 25 and failed to overturn the denial.    Pt has requested to proceed with an Expedited Appeal for denial of acute rehab placement to [facility] by his [employer]-replacement insurance plan. CM submitted the appeal form and accompanying clinical documentation to [facility] Appeals Dept (fax# [phone]).    CM also sent soft referrals via [employer] to several local area SNFs that are in network w/ pt's [employer] Medicare-replacement insurance plan in an effort to have a backup plan for short-term skilled rehab in the case that pt's Expedited Appeal still fails to secure him placement at [facility].    CM to follow.     [Time Spent: ___ minutes] : I have spent [unfilled] minutes of time on the encounter. [>50% of the face to face encounter time was spent on counseling and/or coordination of care for ___] : Greater than 50% of the face to face encounter time was spent on counseling and/or coordination of care for [unfilled]

## 2025-01-17 NOTE — ASSESSMENT & PLAN NOTE
Patient was recently admitted to [facility] for failure to thrive and generalized weakness following chemotherapy.  Patient was pending placement to rehab facility when he was noted to have gangrene on the right foot and poor healing wound on the left.  Case was discussed with vascular surgery who recommended transfer to [place].    Patient was seen by both podiatry and vascular surgery, CT of the left lower extremity showed severe disease.  No revascularization options available, recommended right AKA which was done 1/8.  Local wound care of right stump per vascular surgery  Patient has stable wounds on the left LE without clinical signs of infection.  Continue local wound care per podiatry on the left  Weightbearing as tolerated  Continue aspirin, Plavix, statin, Trental.   Acute pain has been following, signing off 1/14.  Continue ATC APAP, Robaxin, gabapentin as needed oxycodone.   PT/OT recommending rehab, case management following.   [facility] Pending auth.

## 2025-01-17 NOTE — ASSESSMENT & PLAN NOTE
Results from last 7 days   Lab Units 01/15/25  0512 01/13/25  1301 01/12/25  0431   HEMOGLOBIN g/dL 9.6* 9.7* 7.3*   MCV fL 93 92 94   RDW % 15.6* 15.9* 14.9     Stable at this time, no indication for transfusion. He is status post 1 unit PRBC 1/12

## 2025-01-17 NOTE — WOUND OSTOMY CARE
Progress Note - Wound   Royce L Free 79 y.o. male MRN: [id number]  Unit/Bed#: [facility] 523-01 Encounter: [id number]        Assessment:   Patient seen today for wound care follow up assessment. Patient is incontinent of bowel and bladder. Patient is mod assist with turning from side to side. Patient is independent with meals.     Sacral/buttocks and B/L heels intact and blanching, preventative skin care orders placed.     Left face- hypergranular, pink, small drainage. Placed new foam dressing and reinforced patient to follow up with dermatology.    No induration, fluctuance, odor, warmth/temperature differences, redness, or purulence noted to the above noted wounds and skin areas assessed. New dressings applied per orders listed below. Patient tolerated well- no s/s of non-verbal pain or discomfort observed during the encounter. Bedside nurse aware of plan of care. See flow sheets for more detailed assessment findings.  Wound care will continue to follow, call or Secure Chat Message with questions.       Skin Care Plan:  1-Preventative Hydraguard to B/L Sacro-Buttocks BID and PRN  2-Turn/reposition q2h or when medically stable for pressure re-distribution on skin.  3-Elevate heels to offload pressure.  4-Moisturize skin daily with skin nourishing cream  5-Ehob cushion in chair when out of bed.  6-B/L Foot/Toe Wounds: per Podiatry recommendations.  7-Left Face Wound: Cleanse with NSS and pat dry. Apply a small 2x2 silicone bordered foam dressing to wound. [place]achery with T for treatment and change every other day or PRN    Wound 10/24/24 Other (comment) Face Left (Active)   Wound Image   01/17/25 0920   Wound Description Fragile;Beefy red;Bleeding 01/16/25 0930   Selene-wound Assessment Fragile;Pink 01/16/25 0930   Wound Length (cm) 1.5 cm 01/17/25 0920   Wound Width (cm) 1.5 cm 01/17/25 0920   Wound Depth (cm) 0.1 cm 01/17/25 0920   Wound Surface Area (cm^2) 2.25 cm^2 01/17/25 0920   Wound Volume (cm^3) 0.225 cm^3 01/17/25 0920    Calculated Wound Volume (cm^3) 0.23 cm^3 01/17/25 0920   Change in Wound Size % -130 01/17/25 0920   Wound Site Closure EARL 01/06/25 1930   Drainage Amount Scant 01/16/25 0930   Drainage Description Bloody 01/12/25 2044   Non-staged Wound Description Full thickness 01/10/25 1215   Treatments Cleansed;Irrigation with NSS 01/15/25 0830   Dressing Foam, Silicon (eg. Allevyn, etc) 01/16/25 2030   Dressing Changed Other (Comment) 01/16/25 2030   Patient Tolerance Tolerated well 01/15/25 0830   Dressing Status Clean;Dry;Intact 01/16/25 2030           Carmel Aden BSN, RN, CWOCN

## 2025-01-17 NOTE — ASSESSMENT & PLAN NOTE
Patient with diarrhea since receiving last round of chemotherapy in December 2024.  Also with intermittent nausea/vomiting and overall poor oral intake.    C. difficile, bacterial stool panel negative   Given lack of improvement despite Imodium, GI planning outpatient follow-up to discuss colonoscopy and evaluation of elevated fecal calprotectin  Loperamide PRN, lomotil

## 2025-01-18 LAB
ANION GAP SERPL CALCULATED.3IONS-SCNC: 12 MMOL/L (ref 4–13)
BUN SERPL-MCNC: 21 MG/DL (ref 5–25)
CALCIUM SERPL-MCNC: 8.1 MG/DL (ref 8.4–10.2)
CHLORIDE SERPL-SCNC: 106 MMOL/L (ref 96–108)
CO2 SERPL-SCNC: 18 MMOL/L (ref 21–32)
CREAT SERPL-MCNC: 0.66 MG/DL (ref 0.6–1.3)
ERYTHROCYTE [DISTWIDTH] IN BLOOD BY AUTOMATED COUNT: 15.9 % (ref 11.6–15.1)
GFR SERPL CREATININE-BSD FRML MDRD: 91 ML/MIN/1.73SQ M
GLUCOSE SERPL-MCNC: 80 MG/DL (ref 65–140)
HCT VFR BLD AUTO: 32.2 % (ref 36.5–49.3)
HGB BLD-MCNC: 10 G/DL (ref 12–17)
MAGNESIUM SERPL-MCNC: 2 MG/DL (ref 1.9–2.7)
MCH RBC QN AUTO: 28.7 PG (ref 26.8–34.3)
MCHC RBC AUTO-ENTMCNC: 31.1 G/DL (ref 31.4–37.4)
MCV RBC AUTO: 92 FL (ref 82–98)
PLATELET # BLD AUTO: 456 THOUSANDS/UL (ref 149–390)
PMV BLD AUTO: 9.6 FL (ref 8.9–12.7)
POTASSIUM SERPL-SCNC: 3.6 MMOL/L (ref 3.5–5.3)
RBC # BLD AUTO: 3.49 MILLION/UL (ref 3.88–5.62)
SODIUM SERPL-SCNC: 136 MMOL/L (ref 135–147)
WBC # BLD AUTO: 7.72 THOUSAND/UL (ref 4.31–10.16)

## 2025-01-18 PROCEDURE — 80048 BASIC METABOLIC PNL TOTAL CA: CPT | Performed by: STUDENT IN AN ORGANIZED HEALTH CARE EDUCATION/TRAINING PROGRAM

## 2025-01-18 PROCEDURE — 99232 SBSQ HOSP IP/OBS MODERATE 35: CPT | Performed by: FAMILY MEDICINE

## 2025-01-18 PROCEDURE — 83735 ASSAY OF MAGNESIUM: CPT | Performed by: STUDENT IN AN ORGANIZED HEALTH CARE EDUCATION/TRAINING PROGRAM

## 2025-01-18 PROCEDURE — 85027 COMPLETE CBC AUTOMATED: CPT | Performed by: STUDENT IN AN ORGANIZED HEALTH CARE EDUCATION/TRAINING PROGRAM

## 2025-01-18 RX ADMIN — OXYCODONE HYDROCHLORIDE 10 MG: 10 TABLET ORAL at 21:16

## 2025-01-18 RX ADMIN — SUCRALFATE 1 G: 1 TABLET ORAL at 17:17

## 2025-01-18 RX ADMIN — GABAPENTIN 600 MG: 300 CAPSULE ORAL at 17:17

## 2025-01-18 RX ADMIN — METHOCARBAMOL 500 MG: 500 TABLET ORAL at 08:11

## 2025-01-18 RX ADMIN — LOPERAMIDE HYDROCHLORIDE 2 MG: 2 CAPSULE ORAL at 17:16

## 2025-01-18 RX ADMIN — DICLOFENAC SODIUM 2 G: 10 GEL TOPICAL at 21:17

## 2025-01-18 RX ADMIN — LOPERAMIDE HYDROCHLORIDE 2 MG: 2 CAPSULE ORAL at 05:53

## 2025-01-18 RX ADMIN — PANTOPRAZOLE SODIUM 40 MG: 40 TABLET, DELAYED RELEASE ORAL at 17:17

## 2025-01-18 RX ADMIN — ALLOPURINOL 100 MG: 100 TABLET ORAL at 08:11

## 2025-01-18 RX ADMIN — SUCRALFATE 1 G: 1 TABLET ORAL at 11:05

## 2025-01-18 RX ADMIN — PENTOXIFYLLINE 400 MG: 400 TABLET, EXTENDED RELEASE ORAL at 17:18

## 2025-01-18 RX ADMIN — LOSARTAN POTASSIUM 12.5 MG: 25 TABLET, FILM COATED ORAL at 08:11

## 2025-01-18 RX ADMIN — PREDNISONE 5 MG: 5 TABLET ORAL at 17:17

## 2025-01-18 RX ADMIN — TAMSULOSIN HYDROCHLORIDE 0.4 MG: 0.4 CAPSULE ORAL at 17:17

## 2025-01-18 RX ADMIN — ATORVASTATIN CALCIUM 80 MG: 80 TABLET, FILM COATED ORAL at 17:17

## 2025-01-18 RX ADMIN — GUAIFENESIN 600 MG: 600 TABLET, EXTENDED RELEASE ORAL at 21:13

## 2025-01-18 RX ADMIN — PANTOPRAZOLE SODIUM 40 MG: 40 TABLET, DELAYED RELEASE ORAL at 05:45

## 2025-01-18 RX ADMIN — OXYCODONE HYDROCHLORIDE 10 MG: 10 TABLET ORAL at 17:17

## 2025-01-18 RX ADMIN — CLOPIDOGREL BISULFATE 75 MG: 75 TABLET, FILM COATED ORAL at 08:11

## 2025-01-18 RX ADMIN — EZETIMIBE 10 MG: 10 TABLET ORAL at 08:11

## 2025-01-18 RX ADMIN — METOPROLOL SUCCINATE 25 MG: 25 TABLET, EXTENDED RELEASE ORAL at 08:11

## 2025-01-18 RX ADMIN — EMPAGLIFLOZIN 10 MG: 10 TABLET, FILM COATED ORAL at 08:14

## 2025-01-18 RX ADMIN — METHOCARBAMOL 500 MG: 500 TABLET ORAL at 21:13

## 2025-01-18 RX ADMIN — PENTOXIFYLLINE 400 MG: 400 TABLET, EXTENDED RELEASE ORAL at 11:06

## 2025-01-18 RX ADMIN — GABAPENTIN 600 MG: 300 CAPSULE ORAL at 08:11

## 2025-01-18 RX ADMIN — METHOCARBAMOL 500 MG: 500 TABLET ORAL at 17:16

## 2025-01-18 RX ADMIN — LOPERAMIDE HYDROCHLORIDE 2 MG: 2 CAPSULE ORAL at 11:31

## 2025-01-18 RX ADMIN — FAMOTIDINE 20 MG: 20 TABLET, FILM COATED ORAL at 08:11

## 2025-01-18 RX ADMIN — ASPIRIN 81 MG CHEWABLE TABLET 81 MG: 81 TABLET CHEWABLE at 08:11

## 2025-01-18 RX ADMIN — ACETAMINOPHEN 975 MG: 325 TABLET, FILM COATED ORAL at 05:45

## 2025-01-18 RX ADMIN — DIPHENOXYLATE HYDROCHLORIDE AND ATROPINE SULFATE 1 TABLET: .025; 2.5 TABLET ORAL at 05:53

## 2025-01-18 RX ADMIN — DICLOFENAC SODIUM 2 G: 10 GEL TOPICAL at 08:13

## 2025-01-18 RX ADMIN — DIPHENOXYLATE HYDROCHLORIDE AND ATROPINE SULFATE 1 TABLET: .025; 2.5 TABLET ORAL at 13:21

## 2025-01-18 RX ADMIN — VENLAFAXINE 25 MG: 25 TABLET ORAL at 08:14

## 2025-01-18 RX ADMIN — FINASTERIDE 5 MG: 5 TABLET, FILM COATED ORAL at 08:11

## 2025-01-18 RX ADMIN — DICLOFENAC SODIUM 2 G: 10 GEL TOPICAL at 11:06

## 2025-01-18 RX ADMIN — GUAIFENESIN 600 MG: 600 TABLET, EXTENDED RELEASE ORAL at 08:11

## 2025-01-18 RX ADMIN — PENTOXIFYLLINE 400 MG: 400 TABLET, EXTENDED RELEASE ORAL at 08:13

## 2025-01-18 RX ADMIN — DICLOFENAC SODIUM 2 G: 10 GEL TOPICAL at 17:18

## 2025-01-18 RX ADMIN — POTASSIUM CHLORIDE 40 MEQ: 1500 TABLET, EXTENDED RELEASE ORAL at 08:11

## 2025-01-18 RX ADMIN — SUCRALFATE 1 G: 1 TABLET ORAL at 21:13

## 2025-01-18 RX ADMIN — ACETAMINOPHEN 975 MG: 325 TABLET, FILM COATED ORAL at 21:13

## 2025-01-18 RX ADMIN — FERROUS SULFATE TAB 325 MG (65 MG ELEMENTAL FE) 325 MG: 325 (65 FE) TAB at 08:11

## 2025-01-18 RX ADMIN — SUCRALFATE 1 G: 1 TABLET ORAL at 05:45

## 2025-01-18 RX ADMIN — PREDNISONE 5 MG: 5 TABLET ORAL at 08:11

## 2025-01-18 RX ADMIN — FUROSEMIDE 40 MG: 40 TABLET ORAL at 08:11

## 2025-01-18 RX ADMIN — ONDANSETRON 4 MG: 2 INJECTION INTRAMUSCULAR; INTRAVENOUS at 13:21

## 2025-01-18 RX ADMIN — ACETAMINOPHEN 975 MG: 325 TABLET, FILM COATED ORAL at 13:21

## 2025-01-18 RX ADMIN — OXYCODONE HYDROCHLORIDE 10 MG: 10 TABLET ORAL at 05:52

## 2025-01-18 RX ADMIN — ENOXAPARIN SODIUM 40 MG: 40 INJECTION SUBCUTANEOUS at 08:11

## 2025-01-18 NOTE — ASSESSMENT & PLAN NOTE
Patient was recently admitted to [facility] for failure to thrive and generalized weakness following chemotherapy.  Patient was pending placement to rehab facility when he was noted to have gangrene on the right foot and poor healing wound on the left.  Case was discussed with vascular surgery who recommended transfer to [place].    Patient was seen by both podiatry and vascular surgery, CT of the left lower extremity showed severe disease.  No revascularization options available, recommended right AKA which was done 1/8.  Local wound care of right stump per vascular surgery  Patient has stable wounds on the left LE without clinical signs of infection.  Continue local wound care per podiatry on the left  Weightbearing as tolerated  Continue aspirin, Plavix, statin, Trental.   Acute pain has been following, signing off 1/14.  Continue ATC APAP, Robaxin, gabapentin as needed oxycodone.   PT/OT recommending rehab, case management following.   [facility] Pending auth.

## 2025-01-18 NOTE — PLAN OF CARE
Problem: PAIN - ADULT  Goal: Verbalizes/displays adequate comfort level or baseline comfort level  Description: Interventions:  - Encourage patient to monitor pain and request assistance  - Assess pain using appropriate pain scale  - Administer analgesics based on type and severity of pain and evaluate response  - Implement non-pharmacological measures as appropriate and evaluate response  - Consider cultural and social influences on pain and pain management  - Notify physician/advanced practitioner if interventions unsuccessful or patient reports new pain  Outcome: Progressing     Problem: INFECTION - ADULT  Goal: Absence or prevention of progression during hospitalization  Description: INTERVENTIONS:  - Assess and monitor for signs and symptoms of infection  - Monitor lab/diagnostic results  - Monitor all insertion sites, i.e. indwelling lines, tubes, and drains  - Monitor endotracheal if appropriate and nasal secretions for changes in amount and color  - [place] appropriate cooling/warming therapies per order  - Administer medications as ordered  - Instruct and encourage patient and family to use good hand hygiene technique  - Identify and instruct in appropriate isolation precautions for identified infection/condition  Outcome: Progressing  Goal: Absence of fever/infection during neutropenic period  Description: INTERVENTIONS:  - Monitor WBC    Outcome: Progressing     Problem: SAFETY ADULT  Goal: Patient will remain free of falls  Description: INTERVENTIONS:  - Educate patient/family on patient safety including physical limitations  - Instruct patient to call for assistance with activity   - Consult OT/PT to assist with strengthening/mobility   - Keep Call bell within reach  - Keep bed low and locked with side rails adjusted as appropriate  - Keep care items and personal belongings within reach  - Initiate and maintain comfort rounds  - Make Fall Risk Sign visible to staff  - Offer Toileting every  Hours,  in advance of need  - Initiate/Maintain alarm  - Obtain necessary fall risk management equipment:   - Apply yellow socks and bracelet for high fall risk patients  - Consider moving patient to room near nurses station  Outcome: Progressing  Goal: Maintain or return to baseline ADL function  Description: INTERVENTIONS:  -  Assess patient's ability to carry out ADLs; assess patient's baseline for ADL function and identify physical deficits which impact ability to perform ADLs (bathing, care of mouth/teeth, toileting, grooming, dressing, etc.)  - Assess/evaluate cause of self-care deficits   - Assess range of motion  - Assess patient's mobility; develop plan if impaired  - Assess patient's need for assistive devices and provide as appropriate  - Encourage maximum independence but intervene and supervise when necessary  - Involve family in performance of ADLs  - Assess for home care needs following discharge   - Consider OT consult to assist with ADL evaluation and planning for discharge  - Provide patient education as appropriate  Outcome: Progressing  Goal: Maintains/Returns to pre admission functional level  Description: INTERVENTIONS:  - Perform AM-PAC 6 Click Basic Mobility/ Daily Activity assessment daily.  - Set and communicate daily mobility goal to care team and patient/family/caregiver.   - Collaborate with rehabilitation services on mobility goals if consulted  - Perform Range of Motion  times a day.  - Reposition patient every  hours.  - Dangle patient  times a day  - Stand patient  times a day  - Ambulate patient  times a day  - Out of bed to chair  times a day   - Out of bed for meals  times a day  - Out of bed for toileting  - Record patient progress and toleration of activity level   Outcome: Progressing     Problem: DISCHARGE PLANNING  Goal: Discharge to home or other facility with appropriate resources  Description: INTERVENTIONS:  - Identify barriers to discharge w/patient and caregiver  - Arrange for  needed discharge resources and transportation as appropriate  - Identify discharge learning needs (meds, wound care, etc.)  - Arrange for interpretive services to assist at discharge as needed  - Refer to Case Management Department for coordinating discharge planning if the patient needs post-hospital services based on physician/advanced practitioner order or complex needs related to functional status, cognitive ability, or social support system  Outcome: Progressing     Problem: Knowledge Deficit  Goal: Patient/family/caregiver demonstrates understanding of disease process, treatment plan, medications, and discharge instructions  Description: Complete learning assessment and assess knowledge base.  Interventions:  - Provide teaching at level of understanding  - Provide teaching via preferred learning methods  Outcome: Progressing     Problem: Prexisting or High Potential for Compromised Skin Integrity  Goal: Skin integrity is maintained or improved  Description: INTERVENTIONS:  - Identify patients at risk for skin breakdown  - Assess and monitor skin integrity  - Assess and monitor nutrition and hydration status  - Monitor labs   - Assess for incontinence   - Turn and reposition patient  - Assist with mobility/ambulation  - Relieve pressure over bony prominences  - Avoid friction and shearing  - Provide appropriate hygiene as needed including keeping skin clean and dry  - Evaluate need for skin moisturizer/barrier cream  - Collaborate with interdisciplinary team   - Patient/family teaching  - Consider wound care consult   Outcome: Progressing     Problem: Nutrition/Hydration-ADULT  Goal: Nutrient/Hydration intake appropriate for improving, restoring or maintaining nutritional needs  Description: Monitor and assess patient's nutrition/hydration status for malnutrition. Collaborate with interdisciplinary team and initiate plan and interventions as ordered.  Monitor patient's weight and dietary intake as ordered or  per policy. Utilize nutrition screening tool and intervene as necessary. Determine patient's food preferences and provide high-protein, high-caloric foods as appropriate.     INTERVENTIONS:  - Monitor oral intake, urinary output, labs, and treatment plans  - Assess nutrition and hydration status and recommend course of action  - Evaluate amount of meals eaten  - Assist patient with eating if necessary   - Allow adequate time for meals  - Recommend/ encourage appropriate diets, oral nutritional supplements, and vitamin/mineral supplements  - Order, calculate, and assess calorie counts as needed  - Recommend, monitor, and adjust tube feedings and TPN/PPN based on assessed needs  - Assess need for intravenous fluids  - Provide specific nutrition/hydration education as appropriate  - Include patient/family/caregiver in decisions related to nutrition  Outcome: Progressing

## 2025-01-18 NOTE — ASSESSMENT & PLAN NOTE
Results from last 7 days   Lab Units 01/18/25  0540 01/15/25  0512 01/13/25  1301 01/12/25  0431   HEMOGLOBIN g/dL 10.0* 9.6* 9.7* 7.3*   MCV fL 92 93 92 94   RDW % 15.9* 15.6* 15.9* 14.9     Stable at this time, no indication for transfusion. He is status post 1 unit PRBC 1/12

## 2025-01-18 NOTE — ASSESSMENT & PLAN NOTE
Malnutrition Findings:   Adult Malnutrition type: Acute illness  Adult Degree of Malnutrition: Other severe protein calorie malnutrition  Malnutrition Characteristics: Inadequate energy, Weight loss                  360 Statement: related to catabolic illness, inadequate energy intake due to nausea, diarrhea, as evidenced by, 10% weight loss last months, intake < 50% estimated energy requirements for > 5 days, dark sunken orbital. Treatment: Regular diet, oral nutrition supplements.    BMI Findings:           Body mass index is 29.23 kg/m².   Continue with regular diet and nutrition supplement

## 2025-01-18 NOTE — PROGRESS NOTES
Progress Note - Hospitalist   Name: Royce Rene 79 y.o. male I MRN: [id number]  [facility] Date of Admission: 1/1/2025   Date of Service: 1/18/2025 I Hospital Day: 17    Assessment & Plan  PAD (peripheral artery disease) (HCC)  Patient was recently admitted to [facility] for failure to thrive and generalized weakness following chemotherapy.  Patient was pending placement to rehab facility when he was noted to have gangrene on the right foot and poor healing wound on the left.  Case was discussed with vascular surgery who recommended transfer to [place].    Patient was seen by both podiatry and vascular surgery, CT of the left lower extremity showed severe disease.  No revascularization options available, recommended right AKA which was done 1/8.  Local wound care of right stump per vascular surgery  Patient has stable wounds on the left LE without clinical signs of infection.  Continue local wound care per podiatry on the left  Weightbearing as tolerated  Continue aspirin, Plavix, statin, Trental.   Acute pain has been following, signing off 1/14.  Continue ATC APAP, Robaxin, gabapentin as needed oxycodone.   PT/OT recommending rehab, case management following.   [facility] Pending auth.   Ulcer of left foot, limited to breakdown of skin (HCC)  Pic under media.  Low suspicion for active infection  Per podiatry, no surgical plans  Status post angiogram with the intervention previously  Continue with local wound care    Chronic combined systolic and diastolic CHF (congestive heart failure) ([facility])  Wt Readings from Last 3 Encounters:   01/18/25 87.2 kg (192 lb 3.9 oz)   12/26/24 100 kg (220 lb 14.4 oz)   12/13/24 103 kg (227 lb)   Cardiac echo on 8/2/2023 with EF 30 - 35%  Can continue metoprolol, Jardiance, lasix, losartan  Daily weights, I/Os  CAD (coronary artery disease)  Continue aspirin, Plavix, beta-blocker, statin  Urinary retention  Moser catheter placed 1/2  Continue Proscar/Flomax  S/p voiding  trial, urinary retention protocol  Diarrhea  Patient with diarrhea since receiving last round of chemotherapy in December 2024.  Also with intermittent nausea/vomiting and overall poor oral intake.    C. difficile, bacterial stool panel negative   Given lack of improvement despite Imodium, GI planning outpatient follow-up to discuss colonoscopy and evaluation of elevated fecal calprotectin  Loperamide PRN, lomotil    Ambulatory dysfunction  Baseline- close to wheelchair bounded, walks minimally  Status post AKA  Plan for rehab on discharge  Prostate cancer (HCC)  History of prostate cancer follows with oncology receiving chemotherapy (last on 12/12)  Follows with Dr. Hernandez  Current regimen is Cabazitaxel, Neulasta, prednisone, Lupron, denosumab  Continue to follow in outpatient setting    Iron deficiency anemia    Results from last 7 days   Lab Units 01/18/25  0540 01/15/25  0512 01/13/25  1301 01/12/25  0431   HEMOGLOBIN g/dL 10.0* 9.6* 9.7* 7.3*   MCV fL 92 93 92 94   RDW % 15.9* 15.6* 15.9* 14.9     Stable at this time, no indication for transfusion. He is status post 1 unit PRBC 1/12  Mixed hyperlipidemia  Continue statin  History of transcatheter aortic valve replacement (TAVR)  Noted  Severe protein-calorie malnutrition (HCC)  Malnutrition Findings:   Adult Malnutrition type: Acute illness  Adult Degree of Malnutrition: Other severe protein calorie malnutrition  Malnutrition Characteristics: Inadequate energy, Weight loss                  360 Statement: related to catabolic illness, inadequate energy intake due to nausea, diarrhea, as evidenced by, 10% weight loss last months, intake < 50% estimated energy requirements for > 5 days, dark sunken orbital. Treatment: Regular diet, oral nutrition supplements.    BMI Findings:           Body mass index is 29.23 kg/m².   Continue with regular diet and nutrition supplement    VTE Pharmacologic Prophylaxis: VTE Score: 6 High Risk (Score >/= 5) - Pharmacological DVT  Prophylaxis Ordered: enoxaparin (Lovenox). Sequential Compression Devices Ordered.    Mobility:   Basic Mobility Inpatient Raw Score: 8  JH-HLM Goal: 3: Sit at edge of bed  JH-HLM Achieved: 3: Sit at edge of bed  JH-HLM Goal achieved. Continue to encourage appropriate mobility.    Patient Centered Rounds: I performed bedside rounds with nursing staff today.   Discussions with Specialists or Other Care Team Provider: None    Education and Discussions with Family / Patient: Patient declined call to .     Current Length of Stay: 17 day(s)  Current Patient Status: Inpatient   Certification Statement: The patient will continue to require additional inpatient hospital stay due to Pending safe D/C planning  Discharge Plan: Anticipate discharge in 48 hrs to rehab facility.    Code Status: Level 1 - Full Code    Subjective   Is a very pleasant 79-year-old gentleman who was seen evaluated today at bedside.  Patient denies any acute complaints at this time.    Objective :  Temp:  [97.6 °F (36.4 °C)-98 °F (36.7 °C)] 98 °F (36.7 °C)  HR:  [80-91] 85  BP: ()/(52-73) 142/73  Resp:  [15-18] 15  SpO2:  [95 %-97 %] 95 %  O2 Device: None (Room air)    Body mass index is 29.23 kg/m².     Input and Output Summary (last 24 hours):     Intake/Output Summary (Last 24 hours) at 1/18/2025 1350  Last data filed at 1/18/2025 1319  Gross per 24 hour   Intake 960 ml   Output 905 ml   Net 55 ml       Physical Exam  Vitals reviewed.   HENT:      Head: Normocephalic.      Nose: Nose normal.      Mouth/Throat:      Mouth: Mucous membranes are moist.   Eyes:      General: No scleral icterus.  Cardiovascular:      Rate and Rhythm: Normal rate and regular rhythm.   Pulmonary:      Effort: Pulmonary effort is normal. No respiratory distress.   Abdominal:      General: There is no distension.      Palpations: Abdomen is soft.      Tenderness: There is no abdominal tenderness.   Skin:     General: Skin is warm.   Neurological:       Mental Status: He is alert.   Psychiatric:         Mood and Affect: Mood normal.         Behavior: Behavior normal.           Lines/Drains:            Lab Results: I have reviewed the following results:   Results from last 7 days   Lab Units 01/18/25  0540 01/15/25  0512   WBC Thousand/uL 7.72 8.76   HEMOGLOBIN g/dL 10.0* 9.6*   HEMATOCRIT % 32.2* 31.5*   PLATELETS Thousands/uL 456* 450*   SEGS PCT %  --  81*   LYMPHO PCT %  --  9*   MONO PCT %  --  7   EOS PCT %  --  2     Results from last 7 days   Lab Units 01/18/25  0540 01/13/25  1301 01/12/25  0431   SODIUM mmol/L 136   < > 140   POTASSIUM mmol/L 3.6   < > 3.6   CHLORIDE mmol/L 106   < > 110*   CO2 mmol/L 18*   < > 22   BUN mg/dL 21   < > 9   CREATININE mg/dL 0.66   < > 0.68   ANION GAP mmol/L 12   < > 8   CALCIUM mg/dL 8.1*   < > 8.1*   ALBUMIN g/dL  --   --  2.9*   TOTAL BILIRUBIN mg/dL  --   --  0.40   ALK PHOS U/L  --   --  88   ALT U/L  --   --  27   AST U/L  --   --  17   GLUCOSE RANDOM mg/dL 80   < > 87    < > = values in this interval not displayed.                       Recent Cultures (last 7 days):   Results from last 7 days   Lab Units 01/11/25  1827   C DIFF TOXIN B BY PCR  Negative       Imaging Results Review: No pertinent imaging studies reviewed.  Other Study Results Review: No additional pertinent studies reviewed.    Last 24 Hours Medication List:     Current Facility-Administered Medications:     acetaminophen (TYLENOL) tablet 975 mg, Q8H DEWAYNE    allopurinol (ZYLOPRIM) tablet 100 mg, Daily    aspirin chewable tablet 81 mg, Daily    atorvastatin (LIPITOR) tablet 80 mg, Daily With Dinner    bismuth subsalicylate (PEPTO BISMOL) oral suspension 524 mg, Q6H PRN    clopidogrel (PLAVIX) tablet 75 mg, Daily    Diclofenac Sodium (VOLTAREN) 1 % topical gel 2 g, 4x Daily    diphenoxylate-atropine (LOMOTIL) 2.5-0.025 mg per tablet 1 tablet, 4x Daily PRN    Empagliflozin (JARDIANCE) tablet 10 mg, QAM    enoxaparin (LOVENOX) subcutaneous injection 40 mg,  Daily    ezetimibe (ZETIA) tablet 10 mg, Daily    famotidine (PEPCID) tablet 20 mg, Daily    ferrous sulfate tablet 325 mg, Daily    finasteride (PROSCAR) tablet 5 mg, Daily    furosemide (LASIX) tablet 40 mg, Daily    gabapentin (NEURONTIN) capsule 600 mg, BID    guaiFENesin (MUCINEX) 12 hr tablet 600 mg, Q12H DEWAYNE    HYDROmorphone (DILAUDID) injection 0.5 mg, Once    loperamide (IMODIUM) capsule 2 mg, 4x Daily PRN    losartan (COZAAR) tablet 12.5 mg, Daily    menthol-methyl salicylate (BENGAY) 10-15 % cream, 4x Daily PRN    methocarbamol (ROBAXIN) tablet 500 mg, TID    metoprolol succinate (TOPROL-XL) 24 hr tablet 25 mg, Daily    naloxone (NARCAN) 0.04 mg/mL syringe 0.04 mg, Q1MIN PRN    ondansetron (ZOFRAN) injection 4 mg, Q6H PRN    oxyCODONE (ROXICODONE) IR tablet 5 mg, Q4H PRN **OR** oxyCODONE (ROXICODONE) immediate release tablet 10 mg, Q4H PRN    pantoprazole (PROTONIX) EC tablet 40 mg, BID AC    pentoxifylline (TRENtal) ER tablet 400 mg, TID With Meals    potassium chloride (Klor-Con M20) CR tablet 40 mEq, Daily    predniSONE tablet 5 mg, BID With Meals    simethicone (MYLICON) chewable tablet 80 mg, Q6H PRN    sucralfate (CARAFATE) tablet 1 g, 4x Daily (AC & HS)    tamsulosin (FLOMAX) capsule 0.4 mg, Daily With Dinner    venlafaxine (EFFEXOR) tablet 25 mg, Daily    Administrative Statements   Today, Patient Was Seen By: Kade oY MD  I have spent a total time of 40 minutes in caring for this patient on the day of the visit/encounter including Diagnostic results, Prognosis, Instructions for management, Impressions, Reviewing / ordering tests, medicine, procedures  , and Obtaining or reviewing history  .    **Please Note: This note may have been constructed using a voice recognition system.**

## 2025-01-18 NOTE — ASSESSMENT & PLAN NOTE
Wt Readings from Last 3 Encounters:   01/18/25 87.2 kg (192 lb 3.9 oz)   12/26/24 100 kg (220 lb 14.4 oz)   12/13/24 103 kg (227 lb)   Cardiac echo on 8/2/2023 with EF 30 - 35%  Can continue metoprolol, Jardiance, lasix, losartan  Daily weights, I/Os

## 2025-01-19 LAB
ALBUMIN SERPL BCG-MCNC: 3.4 G/DL (ref 3.5–5)
ALP SERPL-CCNC: 94 U/L (ref 34–104)
ALT SERPL W P-5'-P-CCNC: 10 U/L (ref 7–52)
ANION GAP SERPL CALCULATED.3IONS-SCNC: 12 MMOL/L (ref 4–13)
AST SERPL W P-5'-P-CCNC: 13 U/L (ref 13–39)
BASOPHILS # BLD AUTO: 0.04 THOUSANDS/ΜL (ref 0–0.1)
BASOPHILS NFR BLD AUTO: 1 % (ref 0–1)
BILIRUB SERPL-MCNC: 0.47 MG/DL (ref 0.2–1)
BUN SERPL-MCNC: 22 MG/DL (ref 5–25)
CALCIUM ALBUM COR SERPL-MCNC: 8.5 MG/DL (ref 8.3–10.1)
CALCIUM SERPL-MCNC: 8 MG/DL (ref 8.4–10.2)
CHLORIDE SERPL-SCNC: 105 MMOL/L (ref 96–108)
CO2 SERPL-SCNC: 18 MMOL/L (ref 21–32)
CREAT SERPL-MCNC: 0.65 MG/DL (ref 0.6–1.3)
EOSINOPHIL # BLD AUTO: 0.14 THOUSAND/ΜL (ref 0–0.61)
EOSINOPHIL NFR BLD AUTO: 2 % (ref 0–6)
ERYTHROCYTE [DISTWIDTH] IN BLOOD BY AUTOMATED COUNT: 15.9 % (ref 11.6–15.1)
GFR SERPL CREATININE-BSD FRML MDRD: 92 ML/MIN/1.73SQ M
GLUCOSE SERPL-MCNC: 79 MG/DL (ref 65–140)
HCT VFR BLD AUTO: 32.3 % (ref 36.5–49.3)
HGB BLD-MCNC: 10 G/DL (ref 12–17)
IMM GRANULOCYTES # BLD AUTO: 0.07 THOUSAND/UL (ref 0–0.2)
IMM GRANULOCYTES NFR BLD AUTO: 1 % (ref 0–2)
LYMPHOCYTES # BLD AUTO: 0.98 THOUSANDS/ΜL (ref 0.6–4.47)
LYMPHOCYTES NFR BLD AUTO: 14 % (ref 14–44)
MCH RBC QN AUTO: 28.1 PG (ref 26.8–34.3)
MCHC RBC AUTO-ENTMCNC: 31 G/DL (ref 31.4–37.4)
MCV RBC AUTO: 91 FL (ref 82–98)
MONOCYTES # BLD AUTO: 0.51 THOUSAND/ΜL (ref 0.17–1.22)
MONOCYTES NFR BLD AUTO: 7 % (ref 4–12)
NEUTROPHILS # BLD AUTO: 5.18 THOUSANDS/ΜL (ref 1.85–7.62)
NEUTS SEG NFR BLD AUTO: 75 % (ref 43–75)
NRBC BLD AUTO-RTO: 0 /100 WBCS
PLATELET # BLD AUTO: 442 THOUSANDS/UL (ref 149–390)
PMV BLD AUTO: 9.3 FL (ref 8.9–12.7)
POTASSIUM SERPL-SCNC: 3.9 MMOL/L (ref 3.5–5.3)
PROT SERPL-MCNC: 6.1 G/DL (ref 6.4–8.4)
RBC # BLD AUTO: 3.56 MILLION/UL (ref 3.88–5.62)
SODIUM SERPL-SCNC: 135 MMOL/L (ref 135–147)
WBC # BLD AUTO: 6.92 THOUSAND/UL (ref 4.31–10.16)

## 2025-01-19 PROCEDURE — 80053 COMPREHEN METABOLIC PANEL: CPT | Performed by: FAMILY MEDICINE

## 2025-01-19 PROCEDURE — 85025 COMPLETE CBC W/AUTO DIFF WBC: CPT | Performed by: FAMILY MEDICINE

## 2025-01-19 PROCEDURE — 99232 SBSQ HOSP IP/OBS MODERATE 35: CPT | Performed by: FAMILY MEDICINE

## 2025-01-19 RX ADMIN — ASPIRIN 81 MG CHEWABLE TABLET 81 MG: 81 TABLET CHEWABLE at 08:00

## 2025-01-19 RX ADMIN — FERROUS SULFATE TAB 325 MG (65 MG ELEMENTAL FE) 325 MG: 325 (65 FE) TAB at 08:00

## 2025-01-19 RX ADMIN — PENTOXIFYLLINE 400 MG: 400 TABLET, EXTENDED RELEASE ORAL at 16:04

## 2025-01-19 RX ADMIN — EZETIMIBE 10 MG: 10 TABLET ORAL at 08:00

## 2025-01-19 RX ADMIN — ATORVASTATIN CALCIUM 80 MG: 80 TABLET, FILM COATED ORAL at 15:39

## 2025-01-19 RX ADMIN — SIMETHICONE 80 MG: 80 TABLET, CHEWABLE ORAL at 09:04

## 2025-01-19 RX ADMIN — DICLOFENAC SODIUM 2 G: 10 GEL TOPICAL at 08:02

## 2025-01-19 RX ADMIN — PANTOPRAZOLE SODIUM 40 MG: 40 TABLET, DELAYED RELEASE ORAL at 15:39

## 2025-01-19 RX ADMIN — METHOCARBAMOL 500 MG: 500 TABLET ORAL at 08:00

## 2025-01-19 RX ADMIN — GUAIFENESIN 600 MG: 600 TABLET, EXTENDED RELEASE ORAL at 08:00

## 2025-01-19 RX ADMIN — POTASSIUM CHLORIDE 40 MEQ: 1500 TABLET, EXTENDED RELEASE ORAL at 08:01

## 2025-01-19 RX ADMIN — OXYCODONE HYDROCHLORIDE 10 MG: 10 TABLET ORAL at 18:44

## 2025-01-19 RX ADMIN — PANTOPRAZOLE SODIUM 40 MG: 40 TABLET, DELAYED RELEASE ORAL at 05:15

## 2025-01-19 RX ADMIN — ALLOPURINOL 100 MG: 100 TABLET ORAL at 08:00

## 2025-01-19 RX ADMIN — PREDNISONE 5 MG: 5 TABLET ORAL at 07:53

## 2025-01-19 RX ADMIN — DICLOFENAC SODIUM 2 G: 10 GEL TOPICAL at 22:30

## 2025-01-19 RX ADMIN — CLOPIDOGREL BISULFATE 75 MG: 75 TABLET, FILM COATED ORAL at 08:00

## 2025-01-19 RX ADMIN — GABAPENTIN 600 MG: 300 CAPSULE ORAL at 08:00

## 2025-01-19 RX ADMIN — GABAPENTIN 600 MG: 300 CAPSULE ORAL at 17:04

## 2025-01-19 RX ADMIN — ENOXAPARIN SODIUM 40 MG: 40 INJECTION SUBCUTANEOUS at 08:01

## 2025-01-19 RX ADMIN — SUCRALFATE 1 G: 1 TABLET ORAL at 11:26

## 2025-01-19 RX ADMIN — METHOCARBAMOL 500 MG: 500 TABLET ORAL at 22:29

## 2025-01-19 RX ADMIN — ONDANSETRON 4 MG: 2 INJECTION INTRAMUSCULAR; INTRAVENOUS at 09:04

## 2025-01-19 RX ADMIN — SUCRALFATE 1 G: 1 TABLET ORAL at 22:29

## 2025-01-19 RX ADMIN — FAMOTIDINE 20 MG: 20 TABLET, FILM COATED ORAL at 08:00

## 2025-01-19 RX ADMIN — VENLAFAXINE 25 MG: 25 TABLET ORAL at 08:00

## 2025-01-19 RX ADMIN — PREDNISONE 5 MG: 5 TABLET ORAL at 16:04

## 2025-01-19 RX ADMIN — SUCRALFATE 1 G: 1 TABLET ORAL at 16:04

## 2025-01-19 RX ADMIN — TAMSULOSIN HYDROCHLORIDE 0.4 MG: 0.4 CAPSULE ORAL at 15:39

## 2025-01-19 RX ADMIN — PENTOXIFYLLINE 400 MG: 400 TABLET, EXTENDED RELEASE ORAL at 07:58

## 2025-01-19 RX ADMIN — FINASTERIDE 5 MG: 5 TABLET, FILM COATED ORAL at 08:00

## 2025-01-19 RX ADMIN — EMPAGLIFLOZIN 10 MG: 10 TABLET, FILM COATED ORAL at 08:01

## 2025-01-19 RX ADMIN — PENTOXIFYLLINE 400 MG: 400 TABLET, EXTENDED RELEASE ORAL at 11:26

## 2025-01-19 RX ADMIN — ACETAMINOPHEN 975 MG: 325 TABLET, FILM COATED ORAL at 05:15

## 2025-01-19 RX ADMIN — METHOCARBAMOL 500 MG: 500 TABLET ORAL at 15:39

## 2025-01-19 RX ADMIN — ACETAMINOPHEN 975 MG: 325 TABLET, FILM COATED ORAL at 22:29

## 2025-01-19 RX ADMIN — SUCRALFATE 1 G: 1 TABLET ORAL at 05:15

## 2025-01-19 NOTE — ASSESSMENT & PLAN NOTE
Wt Readings from Last 3 Encounters:   01/19/25 89.9 kg (198 lb 3.1 oz)   12/26/24 100 kg (220 lb 14.4 oz)   12/13/24 103 kg (227 lb)   Cardiac echo on 8/2/2023 with EF 30 - 35%  Can continue metoprolol, Jardiance, lasix, losartan  Daily weights, I/Os

## 2025-01-19 NOTE — ASSESSMENT & PLAN NOTE
Patient was recently admitted to [facility] for failure to thrive and generalized weakness following chemotherapy.  Patient was pending placement to rehab facility when he was noted to have gangrene on the right foot and poor healing wound on the left.  Case was discussed with vascular surgery who recommended transfer to [place].    Patient was seen by both podiatry and vascular surgery, CT of the left lower extremity showed severe disease.  No revascularization options available, recommended right AKA which was done 1/8.  Local wound care of right stump per vascular surgery  Patient has stable wounds on the left LE without clinical signs of infection.  Continue local wound care per podiatry on the left  Weightbearing as tolerated  Continue aspirin, Plavix, statin, Trental.   Acute pain has been following, signing off 1/14.  Continue ATC APAP, Robaxin, gabapentin as needed oxycodone.   PT/OT recommending rehab, case management following.   [facility] Pending auth.

## 2025-01-19 NOTE — ASSESSMENT & PLAN NOTE
Malnutrition Findings:   Adult Malnutrition type: Acute illness  Adult Degree of Malnutrition: Other severe protein calorie malnutrition  Malnutrition Characteristics: Inadequate energy, Weight loss                  360 Statement: related to catabolic illness, inadequate energy intake due to nausea, diarrhea, as evidenced by, 10% weight loss last months, intake < 50% estimated energy requirements for > 5 days, dark sunken orbital. Treatment: Regular diet, oral nutrition supplements.    BMI Findings:           Body mass index is 30.14 kg/m².   Continue with regular diet and nutrition supplement

## 2025-01-19 NOTE — PROGRESS NOTES
Progress Note - Hospitalist   Name: Royce Rene 79 y.o. male I MRN: [id number]  [facility] I Date of Admission: 1/1/2025   Date of Service: 1/19/2025 I Hospital Day: 18    Assessment & Plan  PAD (peripheral artery disease) (HCC)  Patient was recently admitted to [facility] for failure to thrive and generalized weakness following chemotherapy.  Patient was pending placement to rehab facility when he was noted to have gangrene on the right foot and poor healing wound on the left.  Case was discussed with vascular surgery who recommended transfer to [place].    Patient was seen by both podiatry and vascular surgery, CT of the left lower extremity showed severe disease.  No revascularization options available, recommended right AKA which was done 1/8.  Local wound care of right stump per vascular surgery  Patient has stable wounds on the left LE without clinical signs of infection.  Continue local wound care per podiatry on the left  Weightbearing as tolerated  Continue aspirin, Plavix, statin, Trental.   Acute pain has been following, signing off 1/14.  Continue ATC APAP, Robaxin, gabapentin as needed oxycodone.   PT/OT recommending rehab, case management following.   [facility] Pending auth.   Ulcer of left foot, limited to breakdown of skin (HCC)  Pic under media.  Low suspicion for active infection  Per podiatry, no surgical plans  Status post angiogram with the intervention previously  Continue with local wound care    Chronic combined systolic and diastolic CHF (congestive heart failure) ([facility])  Wt Readings from Last 3 Encounters:   01/19/25 89.9 kg (198 lb 3.1 oz)   12/26/24 100 kg (220 lb 14.4 oz)   12/13/24 103 kg (227 lb)   Cardiac echo on 8/2/2023 with EF 30 - 35%  Can continue metoprolol, Jardiance, lasix, losartan  Daily weights, I/Os  CAD (coronary artery disease)  Continue aspirin, Plavix, beta-blocker, statin  Urinary retention  Moser catheter placed 1/2  Continue Proscar/Flomax  S/p voiding  trial, urinary retention protocol  Diarrhea  Patient with diarrhea since receiving last round of chemotherapy in December 2024.  Also with intermittent nausea/vomiting and overall poor oral intake.    C. difficile, bacterial stool panel negative   Given lack of improvement despite Imodium, GI planning outpatient follow-up to discuss colonoscopy and evaluation of elevated fecal calprotectin  Loperamide PRN, lomotil    Ambulatory dysfunction  Baseline- close to wheelchair bounded, walks minimally  Status post AKA  Plan for rehab on discharge  Prostate cancer (HCC)  History of prostate cancer follows with oncology receiving chemotherapy (last on 12/12)  Follows with Dr. Hernandez  Current regimen is Cabazitaxel, Neulasta, prednisone, Lupron, denosumab  Continue to follow in outpatient setting    Iron deficiency anemia    Results from last 7 days   Lab Units 01/19/25  0446 01/18/25  0540 01/15/25  0512 01/13/25  1301   HEMOGLOBIN g/dL 10.0* 10.0* 9.6* 9.7*   MCV fL 91 92 93 92   RDW % 15.9* 15.9* 15.6* 15.9*     Stable at this time, no indication for transfusion. He is status post 1 unit PRBC 1/12  Mixed hyperlipidemia  Continue statin  History of transcatheter aortic valve replacement (TAVR)  Noted  Severe protein-calorie malnutrition (HCC)  Malnutrition Findings:   Adult Malnutrition type: Acute illness  Adult Degree of Malnutrition: Other severe protein calorie malnutrition  Malnutrition Characteristics: Inadequate energy, Weight loss                  360 Statement: related to catabolic illness, inadequate energy intake due to nausea, diarrhea, as evidenced by, 10% weight loss last months, intake < 50% estimated energy requirements for > 5 days, dark sunken orbital. Treatment: Regular diet, oral nutrition supplements.    BMI Findings:           Body mass index is 30.14 kg/m².   Continue with regular diet and nutrition supplement    VTE Pharmacologic Prophylaxis: VTE Score: 6 High Risk (Score >/= 5) - Pharmacological DVT  Prophylaxis Ordered: enoxaparin (Lovenox). Sequential Compression Devices Ordered.    Mobility:   Basic Mobility Inpatient Raw Score: 16  JH-HLM Goal: 5: Stand one or more mins  JH-HLM Achieved: 3: Sit at edge of bed  JH-HLM Goal NOT achieved. Continue with multidisciplinary rounding and encourage appropriate mobility to improve upon JH-HLM goals.    Patient Centered Rounds: I performed bedside rounds with nursing staff today.   Discussions with Specialists or Other Care Team Provider: None    Education and Discussions with Family / Patient: Patient declined call to .     Current Length of Stay: 18 day(s)  Current Patient Status: Inpatient   Certification Statement: The patient will continue to require additional inpatient hospital stay due to Pending safe D/C  Discharge Plan: Anticipate discharge in 24-48 hrs to rehab facility.    Code Status: Level 1 - Full Code    Subjective   This is a very pleasant 79 y.o. male who was seen today at bedside. Patient has no new complaints. Patient is not in any acute distress.       Objective :  Temp:  [97.3 °F (36.3 °C)-98.2 °F (36.8 °C)] 98.2 °F (36.8 °C)  HR:  [79-89] 89  BP: ()/(55-57) 101/57  Resp:  [16-18] 16  SpO2:  [94 %-97 %] 96 %  O2 Device: None (Room air)    Body mass index is 30.14 kg/m².     Input and Output Summary (last 24 hours):     Intake/Output Summary (Last 24 hours) at 1/19/2025 1429  Last data filed at 1/19/2025 0820  Gross per 24 hour   Intake 1436 ml   Output 673 ml   Net 763 ml       Physical Exam  Vitals reviewed.   HENT:      Head: Normocephalic.      Nose: Nose normal.      Mouth/Throat:      Mouth: Mucous membranes are moist.   Eyes:      General: No scleral icterus.  Cardiovascular:      Rate and Rhythm: Normal rate and regular rhythm.   Pulmonary:      Effort: Pulmonary effort is normal. No respiratory distress.   Abdominal:      General: There is no distension.      Palpations: Abdomen is soft.      Tenderness: There is no  abdominal tenderness.   Skin:     General: Skin is warm.   Neurological:      Mental Status: He is alert.   Psychiatric:         Mood and Affect: Mood normal.         Behavior: Behavior normal.           Lines/Drains:      Central Line:  Goal for removal: Will discontinue when meds requiring line are completed.               Lab Results: I have reviewed the following results:   Results from last 7 days   Lab Units 01/19/25  0446   WBC Thousand/uL 6.92   HEMOGLOBIN g/dL 10.0*   HEMATOCRIT % 32.3*   PLATELETS Thousands/uL 442*   SEGS PCT % 75   LYMPHO PCT % 14   MONO PCT % 7   EOS PCT % 2     Results from last 7 days   Lab Units 01/19/25  0446   SODIUM mmol/L 135   POTASSIUM mmol/L 3.9   CHLORIDE mmol/L 105   CO2 mmol/L 18*   BUN mg/dL 22   CREATININE mg/dL 0.65   ANION GAP mmol/L 12   CALCIUM mg/dL 8.0*   ALBUMIN g/dL 3.4*   TOTAL BILIRUBIN mg/dL 0.47   ALK PHOS U/L 94   ALT U/L 10   AST U/L 13   GLUCOSE RANDOM mg/dL 79                       Recent Cultures (last 7 days):         Imaging Results Review: No pertinent imaging studies reviewed.  Other Study Results Review: No additional pertinent studies reviewed.    Last 24 Hours Medication List:     Current Facility-Administered Medications:     acetaminophen (TYLENOL) tablet 975 mg, Q8H DEWAYNE    allopurinol (ZYLOPRIM) tablet 100 mg, Daily    aspirin chewable tablet 81 mg, Daily    atorvastatin (LIPITOR) tablet 80 mg, Daily With Dinner    bismuth subsalicylate (PEPTO BISMOL) oral suspension 524 mg, Q6H PRN    clopidogrel (PLAVIX) tablet 75 mg, Daily    Diclofenac Sodium (VOLTAREN) 1 % topical gel 2 g, 4x Daily    diphenoxylate-atropine (LOMOTIL) 2.5-0.025 mg per tablet 1 tablet, 4x Daily PRN    Empagliflozin (JARDIANCE) tablet 10 mg, QAM    enoxaparin (LOVENOX) subcutaneous injection 40 mg, Daily    ezetimibe (ZETIA) tablet 10 mg, Daily    famotidine (PEPCID) tablet 20 mg, Daily    ferrous sulfate tablet 325 mg, Daily    finasteride (PROSCAR) tablet 5 mg, Daily     furosemide (LASIX) tablet 40 mg, Daily    gabapentin (NEURONTIN) capsule 600 mg, BID    guaiFENesin (MUCINEX) 12 hr tablet 600 mg, Q12H DEWAYNE    HYDROmorphone (DILAUDID) injection 0.5 mg, Once    loperamide (IMODIUM) capsule 2 mg, 4x Daily PRN    losartan (COZAAR) tablet 12.5 mg, Daily    menthol-methyl salicylate (BENGAY) 10-15 % cream, 4x Daily PRN    methocarbamol (ROBAXIN) tablet 500 mg, TID    metoprolol succinate (TOPROL-XL) 24 hr tablet 25 mg, Daily    naloxone (NARCAN) 0.04 mg/mL syringe 0.04 mg, Q1MIN PRN    ondansetron (ZOFRAN) injection 4 mg, Q6H PRN    oxyCODONE (ROXICODONE) IR tablet 5 mg, Q4H PRN **OR** oxyCODONE (ROXICODONE) immediate release tablet 10 mg, Q4H PRN    pantoprazole (PROTONIX) EC tablet 40 mg, BID AC    pentoxifylline (TRENtal) ER tablet 400 mg, TID With Meals    potassium chloride (Klor-Con M20) CR tablet 40 mEq, Daily    predniSONE tablet 5 mg, BID With Meals    simethicone (MYLICON) chewable tablet 80 mg, Q6H PRN    sucralfate (CARAFATE) tablet 1 g, 4x Daily (AC & HS)    tamsulosin (FLOMAX) capsule 0.4 mg, Daily With Dinner    venlafaxine (EFFEXOR) tablet 25 mg, Daily    Administrative Statements   Today, Patient Was Seen By: Kade Yo MD  I have spent a total time of 40 minutes in caring for this patient on the day of the visit/encounter including Diagnostic results, Risks and benefits of tx options, Importance of tx compliance, Documenting in the medical record, and Communicating with other healthcare professionals .    **Please Note: This note may have been constructed using a voice recognition system.**

## 2025-01-19 NOTE — ASSESSMENT & PLAN NOTE
Results from last 7 days   Lab Units 01/19/25  0446 01/18/25  0540 01/15/25  0512 01/13/25  1301   HEMOGLOBIN g/dL 10.0* 10.0* 9.6* 9.7*   MCV fL 91 92 93 92   RDW % 15.9* 15.9* 15.6* 15.9*     Stable at this time, no indication for transfusion. He is status post 1 unit PRBC 1/12

## 2025-01-20 PROCEDURE — 97112 NEUROMUSCULAR REEDUCATION: CPT

## 2025-01-20 PROCEDURE — 99232 SBSQ HOSP IP/OBS MODERATE 35: CPT | Performed by: FAMILY MEDICINE

## 2025-01-20 PROCEDURE — 97168 OT RE-EVAL EST PLAN CARE: CPT

## 2025-01-20 PROCEDURE — 97530 THERAPEUTIC ACTIVITIES: CPT

## 2025-01-20 PROCEDURE — NC001 PR NO CHARGE: Performed by: PODIATRIST

## 2025-01-20 PROCEDURE — 97110 THERAPEUTIC EXERCISES: CPT

## 2025-01-20 RX ADMIN — METHOCARBAMOL 500 MG: 500 TABLET ORAL at 08:00

## 2025-01-20 RX ADMIN — POTASSIUM CHLORIDE 40 MEQ: 1500 TABLET, EXTENDED RELEASE ORAL at 08:01

## 2025-01-20 RX ADMIN — FERROUS SULFATE TAB 325 MG (65 MG ELEMENTAL FE) 325 MG: 325 (65 FE) TAB at 08:00

## 2025-01-20 RX ADMIN — TAMSULOSIN HYDROCHLORIDE 0.4 MG: 0.4 CAPSULE ORAL at 16:21

## 2025-01-20 RX ADMIN — SUCRALFATE 1 G: 1 TABLET ORAL at 15:09

## 2025-01-20 RX ADMIN — GABAPENTIN 600 MG: 300 CAPSULE ORAL at 17:51

## 2025-01-20 RX ADMIN — PENTOXIFYLLINE 400 MG: 400 TABLET, EXTENDED RELEASE ORAL at 07:58

## 2025-01-20 RX ADMIN — GABAPENTIN 600 MG: 300 CAPSULE ORAL at 08:00

## 2025-01-20 RX ADMIN — PANTOPRAZOLE SODIUM 40 MG: 40 TABLET, DELAYED RELEASE ORAL at 15:09

## 2025-01-20 RX ADMIN — PENTOXIFYLLINE 400 MG: 400 TABLET, EXTENDED RELEASE ORAL at 16:21

## 2025-01-20 RX ADMIN — ATORVASTATIN CALCIUM 80 MG: 80 TABLET, FILM COATED ORAL at 16:21

## 2025-01-20 RX ADMIN — ALLOPURINOL 100 MG: 100 TABLET ORAL at 08:02

## 2025-01-20 RX ADMIN — FINASTERIDE 5 MG: 5 TABLET, FILM COATED ORAL at 08:01

## 2025-01-20 RX ADMIN — SUCRALFATE 1 G: 1 TABLET ORAL at 11:36

## 2025-01-20 RX ADMIN — EMPAGLIFLOZIN 10 MG: 10 TABLET, FILM COATED ORAL at 08:00

## 2025-01-20 RX ADMIN — EZETIMIBE 10 MG: 10 TABLET ORAL at 08:01

## 2025-01-20 RX ADMIN — SUCRALFATE 1 G: 1 TABLET ORAL at 05:17

## 2025-01-20 RX ADMIN — FAMOTIDINE 20 MG: 20 TABLET, FILM COATED ORAL at 08:02

## 2025-01-20 RX ADMIN — SUCRALFATE 1 G: 1 TABLET ORAL at 21:04

## 2025-01-20 RX ADMIN — PANTOPRAZOLE SODIUM 40 MG: 40 TABLET, DELAYED RELEASE ORAL at 05:18

## 2025-01-20 RX ADMIN — VENLAFAXINE 25 MG: 25 TABLET ORAL at 08:01

## 2025-01-20 RX ADMIN — OXYCODONE HYDROCHLORIDE 10 MG: 10 TABLET ORAL at 07:55

## 2025-01-20 RX ADMIN — METHOCARBAMOL 500 MG: 500 TABLET ORAL at 21:04

## 2025-01-20 RX ADMIN — ACETAMINOPHEN 975 MG: 325 TABLET, FILM COATED ORAL at 21:04

## 2025-01-20 RX ADMIN — PREDNISONE 5 MG: 5 TABLET ORAL at 16:21

## 2025-01-20 RX ADMIN — GUAIFENESIN 600 MG: 600 TABLET, EXTENDED RELEASE ORAL at 21:04

## 2025-01-20 RX ADMIN — PREDNISONE 5 MG: 5 TABLET ORAL at 07:56

## 2025-01-20 RX ADMIN — ACETAMINOPHEN 975 MG: 325 TABLET, FILM COATED ORAL at 05:18

## 2025-01-20 RX ADMIN — METHOCARBAMOL 500 MG: 500 TABLET ORAL at 15:09

## 2025-01-20 RX ADMIN — DICLOFENAC SODIUM 2 G: 10 GEL TOPICAL at 08:05

## 2025-01-20 RX ADMIN — CLOPIDOGREL BISULFATE 75 MG: 75 TABLET, FILM COATED ORAL at 08:04

## 2025-01-20 RX ADMIN — ASPIRIN 81 MG CHEWABLE TABLET 81 MG: 81 TABLET CHEWABLE at 08:00

## 2025-01-20 RX ADMIN — ENOXAPARIN SODIUM 40 MG: 40 INJECTION SUBCUTANEOUS at 08:02

## 2025-01-20 RX ADMIN — GUAIFENESIN 600 MG: 600 TABLET, EXTENDED RELEASE ORAL at 08:00

## 2025-01-20 RX ADMIN — PENTOXIFYLLINE 400 MG: 400 TABLET, EXTENDED RELEASE ORAL at 11:36

## 2025-01-20 NOTE — ASSESSMENT & PLAN NOTE
Malnutrition Findings:   Adult Malnutrition type: Acute illness  Adult Degree of Malnutrition: Other severe protein calorie malnutrition  Malnutrition Characteristics: Inadequate energy, Weight loss                  360 Statement: related to catabolic illness, inadequate energy intake due to nausea, diarrhea, as evidenced by, 10% weight loss last months, intake < 50% estimated energy requirements for > 5 days, dark sunken orbital. Treatment: Regular diet, oral nutrition supplements.    BMI Findings:           Body mass index is 28.79 kg/m².   Continue with regular diet and nutrition supplement

## 2025-01-20 NOTE — ASSESSMENT & PLAN NOTE
Results from last 7 days   Lab Units 01/19/25  0446 01/18/25  0540 01/15/25  0512   HEMOGLOBIN g/dL 10.0* 10.0* 9.6*   MCV fL 91 92 93   RDW % 15.9* 15.9* 15.6*     Stable at this time, no indication for transfusion. He is status post 1 unit PRBC 1/12

## 2025-01-20 NOTE — PHYSICAL THERAPY NOTE
"                                                                                  PHYSICAL THERAPY NOTE          Patient Name: Royce Rene  Today's Date: 1/20/2025 01/20/25 1055   PT Last Visit   PT Visit Date 01/20/25   Note Type   Note Type Treatment   End of Consult   Patient Position at End of Consult Bedside chair;All needs within reach;Bed/Chair alarm activated   Pain Assessment   Pain Assessment Tool 0-10   Pain Score 8   Pain Location/Orientation Orientation: Right;Location: Leg   Pain Onset/Description Onset: Ongoing   Effect of Pain on Daily Activities limits functional mobility   Patient's Stated Pain Goal No pain   Hospital Pain Intervention(s) Repositioned;Emotional support;Rest   Restrictions/Precautions   Weight Bearing Precautions Per Order Yes   RLE Weight Bearing Per Order (S)  NWB  (s/p R AKA)   LLE Weight Bearing Per Order WBAT  (sx shoe)   Braces or Orthoses Other (Comment)  (sx shoe)   Other Precautions Cognitive;Chair Alarm;Bed Alarm;Multiple lines;Telemetry;Fall Risk;Pain   General   Chart Reviewed Yes   Response to Previous Treatment Patient reporting fatigue but able to participate.   Family/Caregiver Present No   Cognition   Overall Cognitive Status Impaired   Arousal/Participation Responsive;Alert   Attention Attends with cues to redirect   Orientation Level Oriented to person;Oriented to place;Oriented to situation   Memory Decreased short term memory   Following Commands Follows one step commands with increased time or repetition   Comments patient pleasant and cooperative, constant verbal/tactile/visual cues for safety and re-direction to task at hand   Subjective   Subjective \"I can try to get to the chair today\"   Bed Mobility   Supine to Sit 2  Maximal assistance   Additional items Assist x 2;HOB elevated;Bedrails;Increased time required;Verbal cues;LE management   Sit to Supine Unable to assess   Additional Comments patient left OOB in bedside chair with alarm and all needs met "   Transfers   Sit to Stand 3  Moderate assistance   Additional items Assist x 2;Increased time required;Verbal cues  (vc for set up)   Stand to Sit 3  Moderate assistance   Additional items Assist x 2;Increased time required;Verbal cues   Additional Comments x7 STS with b/l HHA and knee blocking   Ambulation/Elevation   Gait pattern L Knee Stephanie;Forward Flexion;Decreased foot clearance;Short stride;Step to  (LLE hops)   Gait Assistance 2  Maximal assist   Additional items Assist x 2;Verbal cues;Tactile cues   Assistive Device Other (Comment)  (b/l HHA)   Distance 2'   Stair Management Assistance Not tested   Ambulation/Elevation Additional Comments Patient ambulated 2' with b/l HHA and LLE hops towards bedside chair, requiring constant verbal cues for navigation and LLE hops.   Balance   Static Sitting Fair   Dynamic Sitting Fair -   Static Standing Poor   Dynamic Standing Poor   Ambulatory Poor -   Endurance Deficit   Endurance Deficit Yes   Endurance Deficit Description generalized weakness, fatigue, pain   Activity Tolerance   Activity Tolerance Patient limited by fatigue;Patient limited by pain   Medical Staff Made Aware OT Odalis   Nurse Made Aware RN cleared and updated   Exercises   Quad Sets Sitting;10 reps;AROM;Left   Knee AROM Long Arc Quad Sitting;10 reps;AROM;Left  (x3 SETS)   Ankle Pumps Sitting;10 reps;AROM;Left  (x3 Sets)   Neuro re-ed Patient performed static/dynamic seated balance for ~10 mins, utilizing b/l UE for support and balance during dynamic activities.   Assessment   Prognosis Fair   Problem List Decreased endurance;Impaired balance;Decreased mobility;Impaired judgement;Decreased safety awareness;Obesity;Decreased skin integrity;Pain;Decreased range of motion;Decreased strength;Orthopedic restrictions   Assessment PT initiated treatment session in order to assist patient in achieving goals to improve transfers, gait training, and overall activity tolerance. Patient demonstrated progress  toward achieving functional mobility goals as evidenced by improving activity tolerance, and overall mobility. Patient pleasant, cooperative, and agreeable to today's treatment session. Patient received OOB in bedside chair. Patient is currently Max-ModAx2 bed mobility, transfers, and ambulation. Throughout treatment session patient required both verbal and tactile cuing to improve safety, efficiency, and mechanics of mobility in addition to hands on assistance for all aspects of functional mobility. Additionally, pt required increased time to execute specific mobility tasks with rest breaks in between secondary to gross fatigue and weakness. Patient progressed throughout to ModAx2 with frequent verbal and tactile cues. Patient performed supine to sit to edge of bed requiring HOB elevated, increased time, verbal cues, and trunk/LE management. Patient ambulated 2' with b/l HHA and LLE hops towards bedside chair, requiring constant verbal cues for navigation and LLE hops. Patient performed static/dynamic seated balance for ~10 mins, utilizing b/l UE for support and balance during dynamic activities. Patient performed x5 STS with b/l HHA, knee blocking, and constant verbal/tactile cues for hand placement and set up. Patient tolerated therex well, requiring verbal/visual cues to complete with proper form and muscle activation. Patient noted with moderate fatigue following exercises due to generalized LE weakness / decreased endurance. Patient tolerated therex well, requiring verbal/visual cues to complete with proper form and muscle activation. Patient noted with moderate fatigue following exercises due to generalized LE weakness / decreased endurance.   Patient left OOB in bedside chair with alarm and all needs met.     Patient will benefit from continued skilled physical therapy to address gait / balance dysfunction, decreased activity tolerance, and generalized weakness. The patients AM-PAC Basic Mobility Inpatient  Short From Raw Score is 12 .  Based on AM-PAC scoring and patient presentation, PT currently recommending Level I (Maximum Resource Intensity). Please also refer to the recommendation of the Physical Therapist for safe discharge planning.   Barriers to Discharge Decreased caregiver support;Inaccessible home environment   Goals   Patient Goals to reduce pain and nausea   STG Expiration Date 02/03/25   Short Term Goal #1 1) Perform bed mobility mod-I to participate in frequent repositioning and improve skin integrity; 2) Perform functional transfers MinAx1 to promote I with toileting and OOB mobility; 3) Ambulate 20 feet MinAx1 with RW and LLE hops to participate in household level mobility and transfers; 4) Improve L LE strength by 1/2 grade in order to improve efficiency of tranfers; 5) Improve balance by 1 grade to reduce risk for falls; 6) Improve overall activity tolerance to 60 minutes in order to increase patient's ability to engage in mobility tasks; 7) Patient will propel w/c 200 feet, with adequate UE strength and activity tolerance to complete Mod-I. Patient will demonstrate the ability to manage w/c independently and safely.  (goals extended remain applicable and appropriate)   PT Treatment Day 3   Plan   Treatment/Interventions ADL retraining;Functional transfer training;LE strengthening/ROM;Therapeutic exercise;Endurance training;Patient/family training;Bed mobility;Gait training;Spoke to nursing;Spoke to case management;OT   Progress Progressing toward goals   PT Frequency 2-3x/wk   Discharge Recommendation   Rehab Resource Intensity Level, PT I (Maximum Resource Intensity)   Equipment Recommended Walker;Wheelchair   AM-PAC Basic Mobility Inpatient   Turning in Flat Bed Without Bedrails 3   Lying on Back to Sitting on Edge of Flat Bed Without Bedrails 2   Moving Bed to Chair 2   Standing Up From Chair Using Arms 2   Walk in Room 2   Climb 3-5 Stairs With Railing 1   Basic Mobility Inpatient Raw Score 12    Basic Mobility Standardized Score 32.23   [facility] Highest Level Of Mobility   -[facility] Goal 4: Move to chair/commode   -HL Achieved 5: Stand (1 or more minutes)   Education   Education Provided Mobility training;Home exercise program   Patient Demonstrates acceptance/verbal understanding   End of Consult   Patient Position at End of Consult Bedside chair;Bed/Chair alarm activated;All needs within reach     Kyra Ross PT, DPT

## 2025-01-20 NOTE — PLAN OF CARE
Problem: OCCUPATIONAL THERAPY ADULT  Goal: Performs self-care activities at highest level of function for planned discharge setting.  See evaluation for individualized goals.  Description: Treatment Interventions: ADL retraining, Functional transfer training, UE strengthening/ROM, Endurance training, Cognitive reorientation, Patient/family training, Equipment evaluation/education, Compensatory technique education, Continued evaluation, Energy conservation, Activityengagement          See flowsheet documentation for full assessment, interventions and recommendations.   Outcome: Progressing  Note: Limitation: Decreased ADL status, Decreased UE strength, Decreased Safe judgement during ADL, Decreased cognition, Decreased endurance, Decreased self-care trans, Decreased high-level ADLs  Prognosis: Good  Assessment: Pt seen on this date for OT session focusing on ADL retraining, cognitive reorientation, body mechanics, transfer retraining, increasing activity tolerance/endurance and EOB sitting to increase ability to participate in ADL/functional tasks. Pt was found in bed and was left in chair w/ all needs within reach, chair alarm on. Pt completed bed mob w max ax2, sat eob w s. Sts and stand pivot w mod ax2. Tricep extension w yellow theraband b/l, 3x10. Tolerated well. Pt w/ improvements in endurance, activity tolerance, however is still limited 2* decreased ADL/High-level ADL status, decreased activity tolerance/endurance, decreased cognition, decreased self-care trans, decreased safety awareness and insight to condition. Recommending pt D/C to level 1 when medically stable.   The patient's raw score on the AM-PAC Daily Activity Inpatient Short Form is 16. A raw score of less than 19 suggests the patient may benefit from discharge to post-acute rehabilitation services. Please refer to the recommendation of the Occupational Therapist for safe discharge planning.   Pt will continue to benefit from acute OT services  to meet goals.     Rehab Resource Intensity Level, OT: I (Maximum Resource Intensity)

## 2025-01-20 NOTE — PROGRESS NOTES
Podiatry - Progress Note  Patient: Royce Rene 79 y.o. male   MRN: [id number]  PCP: Anne Chandra MD  Unit/Bed#: [facility] 523-01 Encounter: [id number]  Date Of Visit: 01/20/25    ASSESSMENT:    Royce Rene is a 79 y.o. male with:    Right forefoot wet gangrene, stable, Poole 4   -S/p right AKA DOS 1/8/2025  Left foot dry gangrene, Poole stage 4  Ischemic pain to bilateral lower extremities  Ulceration left lateral midfoot   Severe peripheral vascular disease      PLAN:    Patient was seen and evaluated at bedside with all questions and concerns addressed. Patient's left foot eschars/gangrenes are stable with no signs of infection. There is minor maceration between the toes. They are dressed with betadine paint, Maxorb between the toes and DSD. Patient is stable to be discharged from podiatry standpoint.  Left distal gangrene/eschar and lateral foot eschar are stable with no signs of infection . Please refer to the physical examination section for details.   Patients' lab and vital signs were reviewed. Patient is afebrile with no leukocytosis. Patient does not  meet the SIRS criteria. Will keep monitoring.  Continue local wound care, appreciate nursing assistance with dressing changes. Wound care order is in.  Patient is stable to be discharged from podiatric standpoint.  Elevation and offloading on green foam wedges or pillows when non-ambulatory.  Rest of care per primary team.     Weightbearing status: Weightbearing as tolerated to left foot    SUBJECTIVE:     The patient was seen, evaluated, and assessed at bedside today. The patient was awake, alert, and in no acute distress. No acute events overnight. The patient reports pain to the right lower extremity AKA site but no complaints to the left lower extremity. Patient denies N/V/F/chills/SOB/CP.      OBJECTIVE:     Vitals:   /58 (BP Location: Left arm)   Pulse 84   Temp 98.5 °F (36.9 °C) (Oral)   Resp 16   Wt 85.9 kg (189 lb 6 oz)   SpO2 96%   BMI  "28.79 kg/m²     Temp (24hrs), Av.1 °F (36.7 °C), Min:97.7 °F (36.5 °C), Max:98.5 °F (36.9 °C)      Physical Exam:     Lungs: Non labored breathing  Abdomen: Soft, non-tender.  Lower Extremity:  Cardiovascular status at baseline from admission.  Neurological status at baseline from admission.  Musculoskeletal status post right lower extremity AKA. No calf tenderness noted.       Left foot shows stable dry gangrene to the toes and lateral dorsal midfoot stable eschar with no drainage or other signs of infection. The 1st, 2nd and 3rd interdigital areas show minor maceration with no apparent drainage.     Right lower extremity S/P AKA with intact dressing.         Clinical Images 25:                Additional Data:     Labs:    Results from last 7 days   Lab Units 25  0446   WBC Thousand/uL 6.92   HEMOGLOBIN g/dL 10.0*   HEMATOCRIT % 32.3*   PLATELETS Thousands/uL 442*   SEGS PCT % 75   LYMPHO PCT % 14   MONO PCT % 7   EOS PCT % 2     Results from last 7 days   Lab Units 25  0446   POTASSIUM mmol/L 3.9   CHLORIDE mmol/L 105   CO2 mmol/L 18*   BUN mg/dL 22   CREATININE mg/dL 0.65   CALCIUM mg/dL 8.0*   ALK PHOS U/L 94   ALT U/L 10   AST U/L 13           * I Have Reviewed All Lab Data Listed Above.    Recent Cultures (last 7 days):               Imaging: I have personally reviewed pertinent films in PACS  EKG, Pathology, and Other Studies: I have personally reviewed pertinent reports.    ** Please Note: Portions of the record may have been created with voice recognition software. Occasional wrong word or \"sound a like\" substitutions may have occurred due to the inherent limitations of voice recognition software. Read the chart carefully and recognize, using context, where substitutions have occurred. **      "

## 2025-01-20 NOTE — ASSESSMENT & PLAN NOTE
Wt Readings from Last 3 Encounters:   01/20/25 85.9 kg (189 lb 6 oz)   12/26/24 100 kg (220 lb 14.4 oz)   12/13/24 103 kg (227 lb)   Cardiac echo on 8/2/2023 with EF 30 - 35%  Can continue metoprolol, Jardiance, lasix, losartan  Daily weights, I/Os

## 2025-01-20 NOTE — PLAN OF CARE
Problem: PAIN - ADULT  Goal: Verbalizes/displays adequate comfort level or baseline comfort level  Description: Interventions:  - Encourage patient to monitor pain and request assistance  - Assess pain using appropriate pain scale  - Administer analgesics based on type and severity of pain and evaluate response  - Implement non-pharmacological measures as appropriate and evaluate response  - Consider cultural and social influences on pain and pain management  - Notify physician/advanced practitioner if interventions unsuccessful or patient reports new pain  Outcome: Progressing     Problem: INFECTION - ADULT  Goal: Absence or prevention of progression during hospitalization  Description: INTERVENTIONS:  - Assess and monitor for signs and symptoms of infection  - Monitor lab/diagnostic results  - Monitor all insertion sites, i.e. indwelling lines, tubes, and drains  - Monitor endotracheal if appropriate and nasal secretions for changes in amount and color  - [place] appropriate cooling/warming therapies per order  - Administer medications as ordered  - Instruct and encourage patient and family to use good hand hygiene technique  - Identify and instruct in appropriate isolation precautions for identified infection/condition  Outcome: Progressing  Goal: Absence of fever/infection during neutropenic period  Description: INTERVENTIONS:  - Monitor WBC    Outcome: Progressing     Problem: SAFETY ADULT  Goal: Patient will remain free of falls  Description: INTERVENTIONS:  - Educate patient/family on patient safety including physical limitations  - Instruct patient to call for assistance with activity   - Consult OT/PT to assist with strengthening/mobility   - Keep Call bell within reach  - Keep bed low and locked with side rails adjusted as appropriate  - Keep care items and personal belongings within reach  - Initiate and maintain comfort rounds  - Make Fall Risk Sign visible to staff  - Offer Toileting every  Hours,  in advance of need  - Initiate/Maintain alarm  - Obtain necessary fall risk management equipment:   - Apply yellow socks and bracelet for high fall risk patients  - Consider moving patient to room near nurses station  Outcome: Progressing  Goal: Maintain or return to baseline ADL function  Description: INTERVENTIONS:  -  Assess patient's ability to carry out ADLs; assess patient's baseline for ADL function and identify physical deficits which impact ability to perform ADLs (bathing, care of mouth/teeth, toileting, grooming, dressing, etc.)  - Assess/evaluate cause of self-care deficits   - Assess range of motion  - Assess patient's mobility; develop plan if impaired  - Assess patient's need for assistive devices and provide as appropriate  - Encourage maximum independence but intervene and supervise when necessary  - Involve family in performance of ADLs  - Assess for home care needs following discharge   - Consider OT consult to assist with ADL evaluation and planning for discharge  - Provide patient education as appropriate  Outcome: Progressing  Goal: Maintains/Returns to pre admission functional level  Description: INTERVENTIONS:  - Perform AM-PAC 6 Click Basic Mobility/ Daily Activity assessment daily.  - Set and communicate daily mobility goal to care team and patient/family/caregiver.   - Collaborate with rehabilitation services on mobility goals if consulted  - Perform Range of Motion  times a day.  - Reposition patient every  hours.  - Dangle patient  times a day  - Stand patient  times a day  - Ambulate patient  times a day  - Out of bed to chair  times a day   - Out of bed for meal times a day  - Out of bed for toileting  - Record patient progress and toleration of activity level   Outcome: Progressing     Problem: DISCHARGE PLANNING  Goal: Discharge to home or other facility with appropriate resources  Description: INTERVENTIONS:  - Identify barriers to discharge w/patient and caregiver  - Arrange for  needed discharge resources and transportation as appropriate  - Identify discharge learning needs (meds, wound care, etc.)  - Arrange for interpretive services to assist at discharge as needed  - Refer to Case Management Department for coordinating discharge planning if the patient needs post-hospital services based on physician/advanced practitioner order or complex needs related to functional status, cognitive ability, or social support system  Outcome: Progressing     Problem: Knowledge Deficit  Goal: Patient/family/caregiver demonstrates understanding of disease process, treatment plan, medications, and discharge instructions  Description: Complete learning assessment and assess knowledge base.  Interventions:  - Provide teaching at level of understanding  - Provide teaching via preferred learning methods  Outcome: Progressing     Problem: Prexisting or High Potential for Compromised Skin Integrity  Goal: Skin integrity is maintained or improved  Description: INTERVENTIONS:  - Identify patients at risk for skin breakdown  - Assess and monitor skin integrity  - Assess and monitor nutrition and hydration status  - Monitor labs   - Assess for incontinence   - Turn and reposition patient  - Assist with mobility/ambulation  - Relieve pressure over bony prominences  - Avoid friction and shearing  - Provide appropriate hygiene as needed including keeping skin clean and dry  - Evaluate need for skin moisturizer/barrier cream  - Collaborate with interdisciplinary team   - Patient/family teaching  - Consider wound care consult   Outcome: Progressing     Problem: Nutrition/Hydration-ADULT  Goal: Nutrient/Hydration intake appropriate for improving, restoring or maintaining nutritional needs  Description: Monitor and assess patient's nutrition/hydration status for malnutrition. Collaborate with interdisciplinary team and initiate plan and interventions as ordered.  Monitor patient's weight and dietary intake as ordered or  per policy. Utilize nutrition screening tool and intervene as necessary. Determine patient's food preferences and provide high-protein, high-caloric foods as appropriate.     INTERVENTIONS:  - Monitor oral intake, urinary output, labs, and treatment plans  - Assess nutrition and hydration status and recommend course of action  - Evaluate amount of meals eaten  - Assist patient with eating if necessary   - Allow adequate time for meals  - Recommend/ encourage appropriate diets, oral nutritional supplements, and vitamin/mineral supplements  - Order, calculate, and assess calorie counts as needed  - Recommend, monitor, and adjust tube feedings and TPN/PPN based on assessed needs  - Assess need for intravenous fluids  - Provide specific nutrition/hydration education as appropriate  - Include patient/family/caregiver in decisions related to nutrition  Outcome: Progressing      pt alert and oriented x4 walked c/o pain to the left wrist tripped and fall at home bedroom denies any  head trauma  went urgent care ( Commuted impaction fracture across the distal radial metaphysis ) sent her for eval hx HTN

## 2025-01-20 NOTE — ASSESSMENT & PLAN NOTE
Patient was recently admitted to [facility] for failure to thrive and generalized weakness following chemotherapy.  Patient was pending placement to rehab facility when he was noted to have gangrene on the right foot and poor healing wound on the left.  Case was discussed with vascular surgery who recommended transfer to [place].    Patient was seen by both podiatry and vascular surgery, CT of the left lower extremity showed severe disease.  No revascularization options available, recommended right AKA which was done 1/8.  Local wound care of right stump per vascular surgery  Patient has stable wounds on the left LE without clinical signs of infection.  Continue local wound care per podiatry on the left  Weightbearing as tolerated  Continue aspirin, Plavix, statin, Trental.   Acute pain has been following, signing off 1/14.  Continue ATC APAP, Robaxin, gabapentin as needed oxycodone.   PT/OT recommending rehab, case management following.   [facility] Pending auth.   Pending expedited appeal process

## 2025-01-20 NOTE — PLAN OF CARE
Problem: PHYSICAL THERAPY ADULT  Goal: Performs mobility at highest level of function for planned discharge setting.  See evaluation for individualized goals.  Description: Treatment/Interventions: ADL retraining, Functional transfer training, LE strengthening/ROM, Elevations, Therapeutic exercise, Endurance training, Patient/family training, Bed mobility, Gait training, Spoke to nursing, Spoke to case management, OT  Equipment Recommended: Walker       See flowsheet documentation for full assessment, interventions and recommendations.  Outcome: Progressing  Note: Prognosis: Fair  Problem List: Decreased endurance, Impaired balance, Decreased mobility, Impaired judgement, Decreased safety awareness, Obesity, Decreased skin integrity, Pain, Decreased range of motion, Decreased strength, Orthopedic restrictions  Assessment: PT initiated treatment session in order to assist patient in achieving goals to improve transfers, gait training, and overall activity tolerance. Patient demonstrated progress toward achieving functional mobility goals as evidenced by improving activity tolerance, and overall mobility. Patient pleasant, cooperative, and agreeable to today's treatment session. Patient received OOB in bedside chair. Patient is currently Max-ModAx2 bed mobility, transfers, and ambulation. Throughout treatment session patient required both verbal and tactile cuing to improve safety, efficiency, and mechanics of mobility in addition to hands on assistance for all aspects of functional mobility. Additionally, pt required increased time to execute specific mobility tasks with rest breaks in between secondary to gross fatigue and weakness. Patient progressed throughout to ModAx2 with frequent verbal and tactile cues. Patient performed supine to sit to edge of bed requiring HOB elevated, increased time, verbal cues, and trunk/LE management. Patient ambulated 2' with b/l HHA and LLE hops towards bedside chair, requiring  constant verbal cues for navigation and LLE hops. Patient performed static/dynamic seated balance for ~10 mins, utilizing b/l UE for support and balance during dynamic activities. Patient performed x5 STS with b/l HHA, knee blocking, and constant verbal/tactile cues for hand placement and set up. Patient tolerated therex well, requiring verbal/visual cues to complete with proper form and muscle activation. Patient noted with moderate fatigue following exercises due to generalized LE weakness / decreased endurance. Patient tolerated therex well, requiring verbal/visual cues to complete with proper form and muscle activation. Patient noted with moderate fatigue following exercises due to generalized LE weakness / decreased endurance.   Patient left OOB in bedside chair with alarm and all needs met. Patient will benefit from continued skilled physical therapy to address gait / balance dysfunction, decreased activity tolerance, and generalized weakness. The patients AM-PAC Basic Mobility Inpatient Short From Raw Score is 12 .  Based on AM-PAC scoring and patient presentation, PT currently recommending Level I (Maximum Resource Intensity). Please also refer to the recommendation of the Physical Therapist for safe discharge planning.  Barriers to Discharge: Decreased caregiver support, Inaccessible home environment     Rehab Resource Intensity Level, PT: I (Maximum Resource Intensity)    See flowsheet documentation for full assessment.

## 2025-01-20 NOTE — OCCUPATIONAL THERAPY NOTE
Occupational Therapy Re-Evaluation/ tx      Patient Name: Royce Rene  Today's Date: 1/20/2025  Problem List  Principal Problem:    PAD (peripheral artery disease) ([facility])  Active Problems:    Chronic combined systolic and diastolic CHF (congestive heart failure) ([facility])    Ischemic cardiomyopathy    Prostate cancer ([facility])    CAD (coronary artery disease)    Urinary retention    Ambulatory dysfunction    Ulcer of left foot, limited to breakdown of skin ([facility])    Mixed hyperlipidemia    Diarrhea    Iron deficiency anemia    History of transcatheter aortic valve replacement (TAVR)    Encounter for preoperative assessment for noncoronary cardiac surgery    Severe protein-calorie malnutrition ([facility])    Nausea    S/P AKA (above knee amputation) unilateral, right (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) 06/14/2021    High cholesterol     Pacemaker     Prostate cancer ([facility])      Past Surgical History  Past Surgical History:   Procedure Laterality Date    AMPUTATION ABOVE KNEE (AKA) Right 1/8/2025    Procedure: RIGHT AMPUTATION ABOVE KNEE (AKA);  Surgeon: Serina Cook DO;  Location: [facility];  Service: Vascular    CARDIAC CATHETERIZATION      CARDIAC ELECTROPHYSIOLOGY PROCEDURE N/A 12/23/2021    Procedure: Implant ICD - bi ventricular;  Surgeon: Jonas Oviedo MD;  Location: [facility] C[facility];  Service: Cardiology    COLONOSCOPY      IR LOWER EXTREMITY ANGIOGRAM  04/18/2023    IR LOWER EXTREMITY ANGIOGRAM  10/30/2024    IR LOWER EXTREMITY ANGIOGRAM  1/7/2025    WV TEAEC W/WO PATCH GRAFT COMMON FEMORAL Right 07/09/2021    Procedure: ENDARTERECTOMY ARTERIAL FEMORAL;  Surgeon: Serina Cook DO;  Location: [facility];  Service: Vascular           01/20/25 1052   OT Last  Visit   OT Visit Date 01/20/25   Note Type   Note Type Treatment   Pain Assessment   Pain Assessment Tool 0-10   Pain Score 8   Pain Location/Orientation Orientation: Right;Location: Leg   Patient's Stated Pain Goal No pain   Hospital Pain Intervention(s) Repositioned;Ambulation/increased activity;Emotional support   Restrictions/Precautions   Weight Bearing Precautions Per Order Yes   RLE Weight Bearing Per Order NWB   LLE Weight Bearing Per Order WBAT   Braces or Orthoses   (sx shoe LLE)   Other Precautions Cognitive;Chair Alarm;Bed Alarm;WBS;Fall Risk;Pain  (s/p R BKA)   Bed Mobility   Supine to Sit 2  Maximal assistance   Additional items Assist x 2;Increased time required;Verbal cues;LE management   Additional Comments found in bed, left in chair w all needs in reach and alarm on.   Transfers   Sit to Stand 3  Moderate assistance   Additional items Assist x 2;Increased time required;Verbal cues   Stand to Sit 3  Moderate assistance   Additional items Assist x 2;Increased time required;Verbal cues   Stand pivot 3  Moderate assistance   Additional items Assist x 2;Increased time required;Verbal cues   Additional Comments b/l HHA and knee blocking, vc for hand placement and inc safety awareness. completed ~7 STS for fxnl activity tolerance and eventual re-engagement in ADL @ standing level.   Therapeutic Excerise-Strength   UE Strength Yes   Right Upper Extremity- Strength   RUE Strength Comment b/l tricep extension and flexion using yellow theraband- completed 3x10 .   Left Upper Extremity-Strength   LUE Strength Comment b/l tricep extension and flexion using yellow theraband- completed 3x10 .   Cognition   Overall Cognitive Status Impaired   Arousal/Participation Responsive;Alert   Attention Attends with cues to redirect   Orientation Level Oriented to person;Oriented to place;Oriented to situation   Memory Within functional limits   Following Commands Follows one step commands with increased time or repetition    Comments pt remains cooperative, vc for inc safety awareness. continues to present with decreased processing speeds and overall decreased insight to condition/clinical course.   Activity Tolerance   Activity Tolerance Patient limited by fatigue;Patient limited by pain   Medical Staff Made Aware DPT 2' pts med complexity, comorbidities and regression from baseline.   Assessment   Assessment Pt seen on this date for OT session focusing on ADL retraining, cognitive reorientation, body mechanics, transfer retraining, increasing activity tolerance/endurance and EOB sitting to increase ability to participate in ADL/functional tasks. Pt was found in bed and was left in chair w/ all needs within reach, chair alarm on. Pt completed bed mob w max ax2, sat eob w s. Sts and stand pivot w mod ax2. Tricep extension w yellow theraband b/l, 3x10. Tolerated well. Pt w/ improvements in endurance, activity tolerance, however is still limited 2* decreased ADL/High-level ADL status, decreased activity tolerance/endurance, decreased cognition, decreased self-care trans, decreased safety awareness and insight to condition. Recommending pt D/C to level 1 when medically stable.   The patient's raw score on the AM-PAC Daily Activity Inpatient Short Form is 16. A raw score of less than 19 suggests the patient may benefit from discharge to post-acute rehabilitation services. Please refer to the recommendation of the Occupational Therapist for safe discharge planning.   Pt will continue to benefit from acute OT services to meet goals.   Plan   Treatment Interventions ADL retraining;Functional transfer training;Endurance training;Patient/family training;Equipment evaluation/education;Compensatory technique education;Energy conservation;Activityengagement;UE strengthening/ROM   Goal Expiration Date 02/03/25   OT Treatment Day 4   OT Frequency 2-3x/wk   Discharge Recommendation   Rehab Resource Intensity Level, OT I (Maximum Resource Intensity)    AM-PAC Daily Activity Inpatient   Lower Body Dressing 2   Bathing 2   Toileting 2   Upper Body Dressing 3   Grooming 3   Eating 4   Daily Activity Raw Score 16   Daily Activity Standardized Score (Calc for Raw Score >=11) 35.96   AM-PAC Applied Cognition Inpatient   Following a Speech/Presentation 3   Understanding Ordinary Conversation 3   Taking Medications 2   Remembering Where Things Are Placed or Put Away 2   Remembering List of 4-5 Errands 2   Taking Care of Complicated Tasks 2   Applied Cognition Raw Score 14   Applied Cognition Standardized Score 32.02   Modified Mello Scale   Modified Mello Scale 4   End of Consult   Education Provided Yes   Patient Position at End of Consult Bedside chair;Bed/Chair alarm activated;All needs within reach   Nurse Communication Nurse aware of consult       Goals remain applicable at this time, ext +14 days.   Odalis Hoffmann, MOT, OTR/L

## 2025-01-20 NOTE — PROGRESS NOTES
Progress Note - Hospitalist   Name: Royce Rene 79 y.o. male I MRN: [id number]  [facility] I Date of Admission: 1/1/2025   Date of Service: 1/20/2025 I Hospital Day: 19    Assessment & Plan  PAD (peripheral artery disease) (HCC)  Patient was recently admitted to [facility] for failure to thrive and generalized weakness following chemotherapy.  Patient was pending placement to rehab facility when he was noted to have gangrene on the right foot and poor healing wound on the left.  Case was discussed with vascular surgery who recommended transfer to [place].    Patient was seen by both podiatry and vascular surgery, CT of the left lower extremity showed severe disease.  No revascularization options available, recommended right AKA which was done 1/8.  Local wound care of right stump per vascular surgery  Patient has stable wounds on the left LE without clinical signs of infection.  Continue local wound care per podiatry on the left  Weightbearing as tolerated  Continue aspirin, Plavix, statin, Trental.   Acute pain has been following, signing off 1/14.  Continue ATC APAP, Robaxin, gabapentin as needed oxycodone.   PT/OT recommending rehab, case management following.   [facility] Pending auth.   Pending expedited appeal process  Ulcer of left foot, limited to breakdown of skin (HCC)  Pic under media.  Low suspicion for active infection  Per podiatry, no surgical plans  Status post angiogram with the intervention previously  Continue with local wound care    Chronic combined systolic and diastolic CHF (congestive heart failure) ([facility])  Wt Readings from Last 3 Encounters:   01/20/25 85.9 kg (189 lb 6 oz)   12/26/24 100 kg (220 lb 14.4 oz)   12/13/24 103 kg (227 lb)   Cardiac echo on 8/2/2023 with EF 30 - 35%  Can continue metoprolol, Jardiance, lasix, losartan  Daily weights, I/Os  CAD (coronary artery disease)  Continue aspirin, Plavix, beta-blocker, statin  Urinary retention  Moser catheter placed 1/2  Continue  Proscar/Flomax  S/p voiding trial, urinary retention protocol  Diarrhea  Patient with diarrhea since receiving last round of chemotherapy in December 2024.  Also with intermittent nausea/vomiting and overall poor oral intake.    C. difficile, bacterial stool panel negative   Given lack of improvement despite Imodium, GI planning outpatient follow-up to discuss colonoscopy and evaluation of elevated fecal calprotectin  Loperamide PRN, lomotil    Ambulatory dysfunction  Baseline- close to wheelchair bounded, walks minimally  Status post AKA  Plan for rehab on discharge  Prostate cancer (HCC)  History of prostate cancer follows with oncology receiving chemotherapy (last on 12/12)  Follows with Dr. Hernandez  Current regimen is Cabazitaxel, Neulasta, prednisone, Lupron, denosumab  Continue to follow in outpatient setting    Iron deficiency anemia    Results from last 7 days   Lab Units 01/19/25  0446 01/18/25  0540 01/15/25  0512   HEMOGLOBIN g/dL 10.0* 10.0* 9.6*   MCV fL 91 92 93   RDW % 15.9* 15.9* 15.6*     Stable at this time, no indication for transfusion. He is status post 1 unit PRBC 1/12  Mixed hyperlipidemia  Continue statin  History of transcatheter aortic valve replacement (TAVR)  Noted  Severe protein-calorie malnutrition (HCC)  Malnutrition Findings:   Adult Malnutrition type: Acute illness  Adult Degree of Malnutrition: Other severe protein calorie malnutrition  Malnutrition Characteristics: Inadequate energy, Weight loss                  360 Statement: related to catabolic illness, inadequate energy intake due to nausea, diarrhea, as evidenced by, 10% weight loss last months, intake < 50% estimated energy requirements for > 5 days, dark sunken orbital. Treatment: Regular diet, oral nutrition supplements.    BMI Findings:           Body mass index is 28.79 kg/m².   Continue with regular diet and nutrition supplement    VTE Pharmacologic Prophylaxis: VTE Score: 6 High Risk (Score >/= 5) - Pharmacological DVT  Prophylaxis Ordered: enoxaparin (Lovenox). Sequential Compression Devices Ordered.    Mobility:   Basic Mobility Inpatient Raw Score: 12  JH-HLM Goal: 4: Move to chair/commode  JH-HLM Achieved: 5: Stand (1 or more minutes)  JH-HLM Goal achieved. Continue to encourage appropriate mobility.    Patient Centered Rounds: I performed bedside rounds with nursing staff today.   Discussions with Specialists or Other Care Team Provider: None    Education and Discussions with Family / Patient: Patient declined call to .     Current Length of Stay: 19 day(s)  Current Patient Status: Inpatient   Certification Statement: The patient will continue to require additional inpatient hospital stay due to pending safe discharge planning  Discharge Plan:  Pending expedited appeal process    Code Status: Level 1 - Full Code    Subjective   This is a very pleasant 79 y.o. male who was seen today at bedside. Patient has no new complaints. Patient is not in any acute distress.       Objective :  Temp:  [97.7 °F (36.5 °C)-98.5 °F (36.9 °C)] 98.5 °F (36.9 °C)  HR:  [84-93] 84  BP: (102-117)/(57-71) 102/58  Resp:  [16] 16  SpO2:  [95 %-97 %] 96 %  O2 Device: None (Room air)    Body mass index is 28.79 kg/m².     Input and Output Summary (last 24 hours):     Intake/Output Summary (Last 24 hours) at 1/20/2025 1419  Last data filed at 1/20/2025 1158  Gross per 24 hour   Intake 420 ml   Output 744 ml   Net -324 ml       Physical Exam  Vitals reviewed.   HENT:      Head: Normocephalic.      Nose: Nose normal.      Mouth/Throat:      Mouth: Mucous membranes are moist.   Eyes:      General: No scleral icterus.  Cardiovascular:      Rate and Rhythm: Normal rate and regular rhythm.   Pulmonary:      Effort: Pulmonary effort is normal. No respiratory distress.   Abdominal:      General: There is no distension.      Palpations: Abdomen is soft.      Tenderness: There is no abdominal tenderness.   Skin:     General: Skin is warm.    Neurological:      Mental Status: He is alert.   Psychiatric:         Mood and Affect: Mood normal.         Behavior: Behavior normal.           Lines/Drains:      Central Line:  Goal for removal: Will discontinue when meds requiring line are completed.               Lab Results: I have reviewed the following results:   Results from last 7 days   Lab Units 01/19/25  0446   WBC Thousand/uL 6.92   HEMOGLOBIN g/dL 10.0*   HEMATOCRIT % 32.3*   PLATELETS Thousands/uL 442*   SEGS PCT % 75   LYMPHO PCT % 14   MONO PCT % 7   EOS PCT % 2     Results from last 7 days   Lab Units 01/19/25  0446   SODIUM mmol/L 135   POTASSIUM mmol/L 3.9   CHLORIDE mmol/L 105   CO2 mmol/L 18*   BUN mg/dL 22   CREATININE mg/dL 0.65   ANION GAP mmol/L 12   CALCIUM mg/dL 8.0*   ALBUMIN g/dL 3.4*   TOTAL BILIRUBIN mg/dL 0.47   ALK PHOS U/L 94   ALT U/L 10   AST U/L 13   GLUCOSE RANDOM mg/dL 79                       Recent Cultures (last 7 days):         Imaging Results Review: No pertinent imaging studies reviewed.  Other Study Results Review: No additional pertinent studies reviewed.    Last 24 Hours Medication List:     Current Facility-Administered Medications:     acetaminophen (TYLENOL) tablet 975 mg, Q8H DEWAYNE    allopurinol (ZYLOPRIM) tablet 100 mg, Daily    aspirin chewable tablet 81 mg, Daily    atorvastatin (LIPITOR) tablet 80 mg, Daily With Dinner    bismuth subsalicylate (PEPTO BISMOL) oral suspension 524 mg, Q6H PRN    clopidogrel (PLAVIX) tablet 75 mg, Daily    Diclofenac Sodium (VOLTAREN) 1 % topical gel 2 g, 4x Daily    diphenoxylate-atropine (LOMOTIL) 2.5-0.025 mg per tablet 1 tablet, 4x Daily PRN    Empagliflozin (JARDIANCE) tablet 10 mg, QAM    enoxaparin (LOVENOX) subcutaneous injection 40 mg, Daily    ezetimibe (ZETIA) tablet 10 mg, Daily    famotidine (PEPCID) tablet 20 mg, Daily    ferrous sulfate tablet 325 mg, Daily    finasteride (PROSCAR) tablet 5 mg, Daily    furosemide (LASIX) tablet 40 mg, Daily    gabapentin  (NEURONTIN) capsule 600 mg, BID    guaiFENesin (MUCINEX) 12 hr tablet 600 mg, Q12H DEWAYNE    HYDROmorphone (DILAUDID) injection 0.5 mg, Once    loperamide (IMODIUM) capsule 2 mg, 4x Daily PRN    losartan (COZAAR) tablet 12.5 mg, Daily    menthol-methyl salicylate (BENGAY) 10-15 % cream, 4x Daily PRN    methocarbamol (ROBAXIN) tablet 500 mg, TID    metoprolol succinate (TOPROL-XL) 24 hr tablet 25 mg, Daily    naloxone (NARCAN) 0.04 mg/mL syringe 0.04 mg, Q1MIN PRN    ondansetron (ZOFRAN) injection 4 mg, Q6H PRN    oxyCODONE (ROXICODONE) IR tablet 5 mg, Q4H PRN **OR** oxyCODONE (ROXICODONE) immediate release tablet 10 mg, Q4H PRN    pantoprazole (PROTONIX) EC tablet 40 mg, BID AC    pentoxifylline (TRENtal) ER tablet 400 mg, TID With Meals    potassium chloride (Klor-Con M20) CR tablet 40 mEq, Daily    predniSONE tablet 5 mg, BID With Meals    simethicone (MYLICON) chewable tablet 80 mg, Q6H PRN    sucralfate (CARAFATE) tablet 1 g, 4x Daily (AC & HS)    tamsulosin (FLOMAX) capsule 0.4 mg, Daily With Dinner    venlafaxine (EFFEXOR) tablet 25 mg, Daily    Administrative Statements   Today, Patient Was Seen By: Kade Yo MD  I have spent a total time of 40 minutes in caring for this patient on the day of the visit/encounter including Diagnostic results, Instructions for management, Risk factor reductions, Reviewing / ordering tests, medicine, procedures  , and Obtaining or reviewing history  .    **Please Note: This note may have been constructed using a voice recognition system.**

## 2025-01-21 PROCEDURE — 99232 SBSQ HOSP IP/OBS MODERATE 35: CPT | Performed by: INTERNAL MEDICINE

## 2025-01-21 RX ORDER — HYDROMORPHONE HCL/PF 1 MG/ML
0.5 SYRINGE (ML) INJECTION EVERY 4 HOURS PRN
Refills: 0 | Status: DISCONTINUED | OUTPATIENT
Start: 2025-01-21 | End: 2025-01-24 | Stop reason: HOSPADM

## 2025-01-21 RX ADMIN — BISMUTH SUBSALICYLATE 524 MG: 525 LIQUID ORAL at 10:36

## 2025-01-21 RX ADMIN — OXYCODONE HYDROCHLORIDE 10 MG: 10 TABLET ORAL at 05:08

## 2025-01-21 RX ADMIN — GABAPENTIN 600 MG: 300 CAPSULE ORAL at 08:40

## 2025-01-21 RX ADMIN — METHOCARBAMOL 500 MG: 500 TABLET ORAL at 22:46

## 2025-01-21 RX ADMIN — FAMOTIDINE 20 MG: 20 TABLET, FILM COATED ORAL at 08:42

## 2025-01-21 RX ADMIN — CLOPIDOGREL BISULFATE 75 MG: 75 TABLET, FILM COATED ORAL at 08:40

## 2025-01-21 RX ADMIN — PANTOPRAZOLE SODIUM 40 MG: 40 TABLET, DELAYED RELEASE ORAL at 17:20

## 2025-01-21 RX ADMIN — FINASTERIDE 5 MG: 5 TABLET, FILM COATED ORAL at 08:41

## 2025-01-21 RX ADMIN — ENOXAPARIN SODIUM 40 MG: 40 INJECTION SUBCUTANEOUS at 08:42

## 2025-01-21 RX ADMIN — GUAIFENESIN 600 MG: 600 TABLET, EXTENDED RELEASE ORAL at 08:42

## 2025-01-21 RX ADMIN — SUCRALFATE 1 G: 1 TABLET ORAL at 22:46

## 2025-01-21 RX ADMIN — EMPAGLIFLOZIN 10 MG: 10 TABLET, FILM COATED ORAL at 08:45

## 2025-01-21 RX ADMIN — PREDNISONE 5 MG: 5 TABLET ORAL at 17:20

## 2025-01-21 RX ADMIN — POTASSIUM CHLORIDE 40 MEQ: 1500 TABLET, EXTENDED RELEASE ORAL at 08:41

## 2025-01-21 RX ADMIN — SUCRALFATE 1 G: 1 TABLET ORAL at 12:19

## 2025-01-21 RX ADMIN — ACETAMINOPHEN 975 MG: 325 TABLET, FILM COATED ORAL at 05:08

## 2025-01-21 RX ADMIN — ACETAMINOPHEN 975 MG: 325 TABLET, FILM COATED ORAL at 22:45

## 2025-01-21 RX ADMIN — ONDANSETRON 4 MG: 2 INJECTION INTRAMUSCULAR; INTRAVENOUS at 10:32

## 2025-01-21 RX ADMIN — OXYCODONE HYDROCHLORIDE 10 MG: 10 TABLET ORAL at 12:19

## 2025-01-21 RX ADMIN — PREDNISONE 5 MG: 5 TABLET ORAL at 08:42

## 2025-01-21 RX ADMIN — OXYCODONE HYDROCHLORIDE 10 MG: 10 TABLET ORAL at 17:19

## 2025-01-21 RX ADMIN — VENLAFAXINE 25 MG: 25 TABLET ORAL at 08:44

## 2025-01-21 RX ADMIN — PANTOPRAZOLE SODIUM 40 MG: 40 TABLET, DELAYED RELEASE ORAL at 05:08

## 2025-01-21 RX ADMIN — GABAPENTIN 600 MG: 300 CAPSULE ORAL at 17:20

## 2025-01-21 RX ADMIN — GUAIFENESIN 600 MG: 600 TABLET, EXTENDED RELEASE ORAL at 22:46

## 2025-01-21 RX ADMIN — METHOCARBAMOL 500 MG: 500 TABLET ORAL at 17:19

## 2025-01-21 RX ADMIN — SUCRALFATE 1 G: 1 TABLET ORAL at 17:20

## 2025-01-21 RX ADMIN — PENTOXIFYLLINE 400 MG: 400 TABLET, EXTENDED RELEASE ORAL at 08:45

## 2025-01-21 RX ADMIN — FERROUS SULFATE TAB 325 MG (65 MG ELEMENTAL FE) 325 MG: 325 (65 FE) TAB at 08:42

## 2025-01-21 RX ADMIN — SUCRALFATE 1 G: 1 TABLET ORAL at 05:08

## 2025-01-21 RX ADMIN — ALLOPURINOL 100 MG: 100 TABLET ORAL at 08:41

## 2025-01-21 RX ADMIN — ASPIRIN 81 MG CHEWABLE TABLET 81 MG: 81 TABLET CHEWABLE at 08:41

## 2025-01-21 RX ADMIN — ACETAMINOPHEN 975 MG: 325 TABLET, FILM COATED ORAL at 14:32

## 2025-01-21 RX ADMIN — TAMSULOSIN HYDROCHLORIDE 0.4 MG: 0.4 CAPSULE ORAL at 17:19

## 2025-01-21 RX ADMIN — ATORVASTATIN CALCIUM 80 MG: 80 TABLET, FILM COATED ORAL at 17:20

## 2025-01-21 RX ADMIN — EZETIMIBE 10 MG: 10 TABLET ORAL at 08:42

## 2025-01-21 RX ADMIN — METHOCARBAMOL 500 MG: 500 TABLET ORAL at 08:42

## 2025-01-21 RX ADMIN — PENTOXIFYLLINE 400 MG: 400 TABLET, EXTENDED RELEASE ORAL at 12:20

## 2025-01-21 RX ADMIN — ONDANSETRON 4 MG: 2 INJECTION INTRAMUSCULAR; INTRAVENOUS at 17:26

## 2025-01-21 RX ADMIN — PENTOXIFYLLINE 400 MG: 400 TABLET, EXTENDED RELEASE ORAL at 17:21

## 2025-01-21 NOTE — PROGRESS NOTES
Progress Note - Hospitalist   Name: Royce Rene 79 y.o. male I MRN: [id number]  [facility] Date of Admission: 1/1/2025   Date of Service: 1/21/2025 I Hospital Day: 20    Assessment & Plan  PAD (peripheral artery disease) (HCC)  Patient was recently admitted to [facility] for failure to thrive and generalized weakness following chemotherapy.  Patient was pending placement to rehab facility when he was noted to have gangrene on the right foot and poor healing wound on the left.  Case was discussed with vascular surgery who recommended transfer to [place].    Patient was seen by both podiatry and vascular surgery, CT of the left lower extremity showed severe disease.  No revascularization options available, recommended right AKA which was done 1/8.  Local wound care of right stump per vascular surgery  Patient has stable wounds on the left LE without clinical signs of infection.  Continue local wound care per podiatry on the left  Weightbearing as tolerated  Continue aspirin, Plavix, statin, Trental.   Acute pain has been following, signing off 1/14.  Continue Tylenol, Robaxin, gabapentin as needed oxycodone.   PT/OT recommending rehab, case management following.   Awaiting placement  Ulcer of left foot, limited to breakdown of skin (HCC)  Pic under media.  Low suspicion for active infection  Per podiatry, no surgical plans  Status post angiogram with the intervention previously  Continue with local wound care    Chronic combined systolic and diastolic CHF (congestive heart failure) ([facility])  Wt Readings from Last 3 Encounters:   01/21/25 86.3 kg (190 lb 4.1 oz)   12/26/24 100 kg (220 lb 14.4 oz)   12/13/24 103 kg (227 lb)   Cardiac echo on 8/2/2023 with EF 30 - 35%  Continue metoprolol, Jardiance, lasix, losartan  Daily weights, I/Os  CAD (coronary artery disease)  Continue aspirin, Plavix, beta-blocker, statin  Stable  Diarrhea  Patient with diarrhea since receiving last round of chemotherapy in  December 2024.  Also with intermittent nausea/vomiting and overall poor oral intake.    Negative stool studies  GI planning outpatient follow-up to discuss colonoscopy and evaluation of elevated fecal calprotectin  Loperamide PRN, lomotil  Improving    Ambulatory dysfunction  Baseline- close to wheelchair bounded, walks minimally  Status post AKA  Plan for rehab on discharge  Prostate cancer (HCC)  History of prostate cancer follows with oncology receiving chemotherapy (last on 12/12)  Follows with Dr. Hernandez  Current regimen is Cabazitaxel, Neulasta, prednisone, Lupron, denosumab  Continue to follow in outpatient setting    Iron deficiency anemia    Results from last 7 days   Lab Units 01/19/25  0446 01/18/25  0540 01/15/25  0512   HEMOGLOBIN g/dL 10.0* 10.0* 9.6*   MCV fL 91 92 93   RDW % 15.9* 15.9* 15.6*      status post 1 unit PRBC 1/12  Continue with iron supplement  Monitor  Mixed hyperlipidemia  Continue statin  History of transcatheter aortic valve replacement (TAVR)  Noted  Severe protein-calorie malnutrition (HCC)  Malnutrition Findings:   Adult Malnutrition type: Acute illness  Adult Degree of Malnutrition: Other severe protein calorie malnutrition  Malnutrition Characteristics: Inadequate energy, Weight loss                  360 Statement: related to catabolic illness, inadequate energy intake due to nausea, diarrhea, as evidenced by, 10% weight loss last months, intake < 50% estimated energy requirements for > 5 days, dark sunken orbital. Treatment: Regular diet, oral nutrition supplements.    BMI Findings:           Body mass index is 28.93 kg/m².   Continue with regular diet and nutrition supplement    VTE Pharmacologic Prophylaxis: VTE Score: 6 High Risk (Score >/= 5) - Pharmacological DVT Prophylaxis Ordered: enoxaparin (Lovenox). Sequential Compression Devices Ordered.    Mobility:   Basic Mobility Inpatient Raw Score: 12  -M Goal: 4: Move to chair/commode  -[facility] Achieved: 3: Sit at edge of  bed  JH-HLM Goal NOT achieved. Continue with multidisciplinary rounding and encourage appropriate mobility to improve upon JH-HLM goals.    Patient Centered Rounds: I performed bedside rounds with nursing staff today.   Discussions with Specialists or Other Care Team Provider: CM    Education and Discussions with Family / Patient: Attempted to update  (son) via phone. Unable to contact.    Current Length of Stay: 20 day(s)  Current Patient Status: Inpatient   Certification Statement: The patient will continue to require additional inpatient hospital stay due to awaiting rehab placement      Code Status: Level 1 - Full Code    Subjective   Patient seen and examined  Comfortable in bed  No event overnight  Continue to have right surgical site pain     Objective :  Temp:  [97.9 °F (36.6 °C)-98.2 °F (36.8 °C)] 98.2 °F (36.8 °C)  HR:  [77-91] 77  BP: (105-119)/(61-73) 105/61  Resp:  [15-17] 15  SpO2:  [96 %-98 %] 98 %  O2 Device: None (Room air)    Body mass index is 28.93 kg/m².     Input and Output Summary (last 24 hours):     Intake/Output Summary (Last 24 hours) at 1/21/2025 1444  Last data filed at 1/21/2025 1145  Gross per 24 hour   Intake 780 ml   Output 1100 ml   Net -320 ml       Physical Exam  Patient is awake alert in no acute distress  Obese chronically ill-appearing, , pale  Lungs clear to auscultation bilateral anteriorly  Heart positive S1-S2 no murmur  Abdomen soft nontender  Status post right AKA, dressing in place  Left lower extremity dressing in place    Lines/Drains:      Central Line:  Goal for removal:  Chronic               Lab Results: I have reviewed the following results:   Results from last 7 days   Lab Units 01/19/25  0446   WBC Thousand/uL 6.92   HEMOGLOBIN g/dL 10.0*   HEMATOCRIT % 32.3*   PLATELETS Thousands/uL 442*   SEGS PCT % 75   LYMPHO PCT % 14   MONO PCT % 7   EOS PCT % 2     Results from last 7 days   Lab Units 01/19/25  0446   SODIUM mmol/L 135   POTASSIUM mmol/L 3.9    CHLORIDE mmol/L 105   CO2 mmol/L 18*   BUN mg/dL 22   CREATININE mg/dL 0.65   ANION GAP mmol/L 12   CALCIUM mg/dL 8.0*   ALBUMIN g/dL 3.4*   TOTAL BILIRUBIN mg/dL 0.47   ALK PHOS U/L 94   ALT U/L 10   AST U/L 13   GLUCOSE RANDOM mg/dL 79                       Recent Cultures (last 7 days):         Imaging Results Review: No pertinent imaging studies reviewed.  Other Study Results Review: No additional pertinent studies reviewed.    Last 24 Hours Medication List:     Current Facility-Administered Medications:     acetaminophen (TYLENOL) tablet 975 mg, Q8H DEWAYNE    allopurinol (ZYLOPRIM) tablet 100 mg, Daily    aspirin chewable tablet 81 mg, Daily    atorvastatin (LIPITOR) tablet 80 mg, Daily With Dinner    bismuth subsalicylate (PEPTO BISMOL) oral suspension 524 mg, Q6H PRN    clopidogrel (PLAVIX) tablet 75 mg, Daily    Diclofenac Sodium (VOLTAREN) 1 % topical gel 2 g, 4x Daily    diphenoxylate-atropine (LOMOTIL) 2.5-0.025 mg per tablet 1 tablet, 4x Daily PRN    Empagliflozin (JARDIANCE) tablet 10 mg, QAM    enoxaparin (LOVENOX) subcutaneous injection 40 mg, Daily    ezetimibe (ZETIA) tablet 10 mg, Daily    famotidine (PEPCID) tablet 20 mg, Daily    ferrous sulfate tablet 325 mg, Daily    finasteride (PROSCAR) tablet 5 mg, Daily    furosemide (LASIX) tablet 40 mg, Daily    gabapentin (NEURONTIN) capsule 600 mg, BID    guaiFENesin (MUCINEX) 12 hr tablet 600 mg, Q12H DEWAYNE    HYDROmorphone (DILAUDID) injection 0.5 mg, Once    HYDROmorphone (DILAUDID) injection 0.5 mg, Q4H PRN    loperamide (IMODIUM) capsule 2 mg, 4x Daily PRN    losartan (COZAAR) tablet 12.5 mg, Daily    menthol-methyl salicylate (BENGAY) 10-15 % cream, 4x Daily PRN    methocarbamol (ROBAXIN) tablet 500 mg, TID    metoprolol succinate (TOPROL-XL) 24 hr tablet 25 mg, Daily    naloxone (NARCAN) 0.04 mg/mL syringe 0.04 mg, Q1MIN PRN    ondansetron (ZOFRAN) injection 4 mg, Q6H PRN    oxyCODONE (ROXICODONE) IR tablet 5 mg, Q4H PRN **OR** oxyCODONE  (ROXICODONE) immediate release tablet 10 mg, Q4H PRN    pantoprazole (PROTONIX) EC tablet 40 mg, BID AC    pentoxifylline (TRENtal) ER tablet 400 mg, TID With Meals    potassium chloride (Klor-Con M20) CR tablet 40 mEq, Daily    predniSONE tablet 5 mg, BID With Meals    simethicone (MYLICON) chewable tablet 80 mg, Q6H PRN    sucralfate (CARAFATE) tablet 1 g, 4x Daily (AC & HS)    tamsulosin (FLOMAX) capsule 0.4 mg, Daily With Dinner    venlafaxine (EFFEXOR) tablet 25 mg, Daily    Administrative Statements   Today, Patient Was Seen By: Estevan Harper DO      **Please Note: This note may have been constructed using a voice recognition system.**

## 2025-01-21 NOTE — ASSESSMENT & PLAN NOTE
Patient with diarrhea since receiving last round of chemotherapy in December 2024.  Also with intermittent nausea/vomiting and overall poor oral intake.    Negative stool studies  GI planning outpatient follow-up to discuss colonoscopy and evaluation of elevated fecal calprotectin  Loperamide PRN, lomotil  Improving

## 2025-01-21 NOTE — ASSESSMENT & PLAN NOTE
Results from last 7 days   Lab Units 01/19/25  0446 01/18/25  0540 01/15/25  0512   HEMOGLOBIN g/dL 10.0* 10.0* 9.6*   MCV fL 91 92 93   RDW % 15.9* 15.9* 15.6*      status post 1 unit PRBC 1/12  Continue with iron supplement  Monitor

## 2025-01-21 NOTE — RESTORATIVE TECHNICIAN NOTE
Restorative Technician Note      Patient Name: Royce Rene     Restorative Tech Visit Date: 01/21/25  Note Type: Mobility  Patient Position Upon Consult: Supine  Mobility / Activity Provided: pt REFUSED getting into recliner  Patient Position at End of Consult: Supine; All needs within reach; Bed/Chair alarm activated

## 2025-01-21 NOTE — UTILIZATION REVIEW
Continued Stay Review    Date: 01/21                          Current Patient Class: Inpatient  Current Level of Care: MS    HPI:79 y.o. male initially admitted on 01/01     Assessment/Plan: No acute ovn events. Pt continues to have R surgical site pain. Cont LWC. Cont WBAT.  Pain control regimen. Cont PT/OT. Cont current meds.  PT/OT recommending level 1 rehab. CM ff for rehab placement.    Medications:   Scheduled Medications:  acetaminophen, 975 mg, Oral, Q8H DEWAYNE  allopurinol, 100 mg, Oral, Daily  aspirin, 81 mg, Oral, Daily  atorvastatin, 80 mg, Oral, Daily With Dinner  clopidogrel, 75 mg, Oral, Daily  Diclofenac Sodium, 2 g, Topical, 4x Daily  Empagliflozin, 10 mg, Oral, QAM  enoxaparin, 40 mg, Subcutaneous, Daily  ezetimibe, 10 mg, Oral, Daily  famotidine, 20 mg, Oral, Daily  ferrous sulfate, 325 mg, Oral, Daily  finasteride, 5 mg, Oral, Daily  furosemide, 40 mg, Oral, Daily  gabapentin, 600 mg, Oral, BID  guaiFENesin, 600 mg, Oral, Q12H DEWAYNE  HYDROmorphone, 0.5 mg, Intravenous, Once  losartan, 12.5 mg, Oral, Daily  methocarbamol, 500 mg, Oral, TID  metoprolol succinate, 25 mg, Oral, Daily  pantoprazole, 40 mg, Oral, BID AC  pentoxifylline, 400 mg, Oral, TID With Meals  potassium chloride, 40 mEq, Oral, Daily  predniSONE, 5 mg, Oral, BID With Meals  sucralfate, 1 g, Oral, 4x Daily (AC & HS)  tamsulosin, 0.4 mg, Oral, Daily With Dinner  venlafaxine, 25 mg, Oral, Daily      Continuous IV Infusions: none     PRN Meds:  bismuth subsalicylate, 524 mg, Oral, Q6H PRN 01/21 x 1  diphenoxylate-atropine, 1 tablet, Oral, 4x Daily PRN  HYDROmorphone, 0.5 mg, Intravenous, Q4H PRN  loperamide, 2 mg, Oral, 4x Daily PRN  menthol-methyl salicylate, , Apply externally, 4x Daily PRN  naloxone, 0.04 mg, Intravenous, Q1MIN PRN  ondansetron, 4 mg, Intravenous, Q6H PRN 01/21 x 1  oxyCODONE, 5 mg, Oral, Q4H PRN   Or  oxyCODONE, 10 mg, Oral, Q4H PRN 01/21 x 2  simethicone, 80 mg, Oral, Q6H PRN      Discharge Plan: TBD    Vital Signs  (last 3 days)       Date/Time Temp Pulse Resp BP MAP (mmHg) SpO2 O2 Device Patient Position - Orthostatic VS [place] Coma Scale Score Pain    01/21/25 14:48:56 98.2 °F (36.8 °C) 87 15 106/62 77 97 % None (Room air) Lying -- --    01/21/25 0800 -- -- -- -- -- 98 % None (Room air) -- 15 --    01/21/25 07:03:01 98.2 °F (36.8 °C) 77 15 105/61 76 97 % None (Room air) Lying -- 10 - Worst Possible Pain    01/21/25 0508 -- -- -- -- -- -- -- -- -- 10 - Worst Possible Pain    01/20/25 22:33:38 97.9 °F (36.6 °C) 91 17 118/73 88 96 % -- Lying -- --    01/20/25 2104 -- -- -- -- -- -- -- -- -- No Pain    01/20/25 2100 -- -- -- -- -- -- None (Room air) -- 15 --    01/20/25 15:05:40 98.1 °F (36.7 °C) 86 15 119/71 87 96 % None (Room air) Lying -- --    01/20/25 1055 -- -- -- -- -- -- -- -- -- 8    01/20/25 1052 -- -- -- -- -- -- -- -- -- 8    01/20/25 0755 -- -- -- -- -- -- -- -- -- 8    01/20/25 0750 -- -- -- -- -- -- None (Room air) -- -- 8    01/20/25 06:56:45 98.5 °F (36.9 °C) 84 16 102/58 73 96 % None (Room air) Lying -- --    01/19/25 23:13:17 98.2 °F (36.8 °C) 93 -- 117/71 86 97 % -- -- -- --    01/19/25 2229 -- -- -- -- -- -- -- -- -- 6 01/19/25 2145 -- -- -- -- -- 95 % None (Room air) -- -- --    01/19/25 1844 -- -- -- -- -- -- -- -- -- 9    01/19/25 15:03:02 97.7 °F (36.5 °C) 90 16 102/57 72 97 % -- -- -- --    01/19/25 08:04:15 -- 89 -- 101/57 72 96 % -- -- -- No Pain    01/19/25 0750 -- -- -- -- -- 96 % None (Room air) -- -- --    01/19/25 07:04:52 98.2 °F (36.8 °C) 79 16 98/57 71 96 % None (Room air) Lying -- --    01/19/25 0515 -- -- -- -- -- -- -- -- -- 6 01/18/25 23:01:21 97.3 °F (36.3 °C) 87 18 98/56 70 94 % -- -- -- --    01/18/25 2116 -- -- -- -- -- -- -- -- -- 8 01/18/25 2113 -- -- -- -- -- -- -- -- -- 8    01/18/25 1950 -- -- -- -- -- -- -- -- 15 No Pain    01/18/25 1717 -- -- -- -- -- -- -- -- -- 7 01/18/25 14:46:10 97.3 °F (36.3 °C) 82 16 95/55 68 97 % -- -- -- --    01/18/25 0820 -- -- -- -- -- --  -- -- -- No Pain    01/18/25 06:58:06 98 °F (36.7 °C) 85 15 142/73 96 95 % None (Room air) Lying -- 10 - Worst Possible Pain    01/18/25 0552 -- -- -- -- -- -- -- -- -- 8    01/18/25 0545 -- -- -- -- -- -- -- -- -- 8          Weight (last 2 days)       Date/Time Weight    01/21/25 0508 86.3 (190.26)    01/20/25 0400 85.9 (189.38)    01/19/25 0600 89.9 (198.19)            Pertinent Labs/Diagnostic Results:   Radiology:  CT abdomen pelvis wo contrast   Final Interpretation by Shayan Thompson MD (01/10 2039)   Exam is limited without IV and oral contrast particularly for soft tissue and bowel evaluation.   1. Moderate gastric distention with air-fluid level. Scattered air-fluid levels in the colon. Small bowel is unremarkable. Findings are nonspecific but could represent underlying gastritis and/or colitis.      Workstation performed: [id number]         XR chest portable   Final Interpretation by Yue Byrd MD (01/10 1328)      No acute pulmonary disease. Stable cardiomegaly.            Workstation performed: [id number]         IR lower extremity angiogram   Final Interpretation by Gavin Sykes MD (01/08 1104)      1.  Intravascular lithotripsy of the left common femoral artery and ostial left superficial femoral artery.   2.  Intravascular lithotripsy of the left tibioperoneal trunk and proximal posterior tibial artery.      Workstation performed: [id number]         CTA ABDOMEN W RUN OFF W WO CONTRAST   Final Interpretation by Gavin Sykes MD (01/02 1800)      Vascular:   Left lower extremity:   1.  Calcific occlusion versus severe stenosis of the left common femoral artery and ostial left superficial femoral artery, progressed from the prior CTA of April 2023.   2.  Severe calcific narrowing of the left P2 segment, similar to April 2023.   3.  Redemonstrated severe calcific tibial vascular disease, limiting luminal evaluation.      Right lower extremity:   1.  Occlusion of the right superficial femoral  arterial stent construct.   2.  Redemonstrated severe calcific tibial vascular disease, limiting luminal evaluation.      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 No pathologic fracture.      Workstation performed: [id number]         XR foot 3+ vw left   Final Interpretation by Geo Iglesias MD (01/02 1414)      1.  No focal osseous destruction.   2.  Likely subacute, mildly displaced/angulated fracture involving the proximal phalanx of the great toe.   3.  Incomplete/healing fracture involving the fifth metatarsal base.         Computerized Assisted Algorithm (CAA) may have been used to analyze all applicable images.         Workstation performed: [id number]           Cardiology:  ECG 12 lead   Final Result by Carson Shah MD (01/05 2320)   Atrial-sensed ventricular-paced rhythm   Abnormal ECG   When compared with ECG of 26-Dec-2024 14:25,   Electronic ventricular pacemaker has replaced Sinus rhythm   Confirmed by Carson Shah ([id number]) on 1/5/2025 11:20:03 PM        GI:  No orders to display           Results from last 7 days   Lab Units 01/19/25  0446 01/18/25  0540 01/15/25  0512   WBC Thousand/uL 6.92 7.72 8.76   HEMOGLOBIN g/dL 10.0* 10.0* 9.6*   HEMATOCRIT % 32.3* 32.2* 31.5*   PLATELETS Thousands/uL 442* 456* 450*   TOTAL NEUT ABS Thousands/µL 5.18  --  7.10         Results from last 7 days   Lab Units 01/19/25  0446 01/18/25  0540 01/16/25  0503 01/15/25  0512   SODIUM mmol/L 135 136 137 139   POTASSIUM mmol/L 3.9 3.6 3.4* 3.3*   CHLORIDE mmol/L 105 106 106 108   CO2 mmol/L 18* 18* 22 21   ANION GAP mmol/L 12 12 9 10   BUN mg/dL 22 21 20 18   CREATININE mg/dL 0.65 0.66 0.72 0.72   EGFR ml/min/1.73sq m 92 91 88 88   CALCIUM mg/dL 8.0* 8.1* 7.5* 7.7*   MAGNESIUM mg/dL  --  2.0  --  2.0     Results from last 7 days   Lab Units 01/19/25  0446   AST U/L 13   ALT U/L 10   ALK PHOS U/L 94   TOTAL PROTEIN g/dL 6.1*   ALBUMIN g/dL  "3.4*   TOTAL BILIRUBIN mg/dL 0.47         Results from last 7 days   Lab Units 01/19/25  0446 01/18/25  0540 01/16/25  0503 01/15/25  0512   GLUCOSE RANDOM mg/dL 79 80 78 78             No results found for: \"BETA-HYDROXYBUTYRATE\"                                                                                                                                         Network Utilization Review Department  ATTENTION: Please call with any questions or concerns to [phone] and carefully listen to the prompts so that you are directed to the right person. All voicemails are confidential.   For Discharge needs, contact Care Management DC Support Team at [phone] opt. 2  Send all requests for admission clinical reviews, approved or denied determinations and any other requests to dedicated fax number below belonging to the campus where the patient is receiving treatment. List of dedicated fax numbers for the Facilities:  FACILITY NAME UR FAX NUMBER   ADMISSION DENIALS (Administrative/Medical Necessity) [phone]   DISCHARGE SUPPORT TEAM (NETWORK) [phone]   PARENT CHILD HEALTH (Maternity/NICU/Pediatrics) [phone]   [facility] [phone]   [facility] [phone]   [facility] [phone]   [facility] [phone]   [facility] [phone]   [facility] [phone]   [facility] [phone]   [facility] [phone]   [facility] [phone]   [facility] [phone]   [facility] [phone]   [facility] [phone]     "

## 2025-01-21 NOTE — ASSESSMENT & PLAN NOTE
Patient was recently admitted to [facility] for failure to thrive and generalized weakness following chemotherapy.  Patient was pending placement to rehab facility when he was noted to have gangrene on the right foot and poor healing wound on the left.  Case was discussed with vascular surgery who recommended transfer to [place].    Patient was seen by both podiatry and vascular surgery, CT of the left lower extremity showed severe disease.  No revascularization options available, recommended right AKA which was done 1/8.  Local wound care of right stump per vascular surgery  Patient has stable wounds on the left LE without clinical signs of infection.  Continue local wound care per podiatry on the left  Weightbearing as tolerated  Continue aspirin, Plavix, statin, Trental.   Acute pain has been following, signing off 1/14.  Continue Tylenol, Robaxin, gabapentin as needed oxycodone.   PT/OT recommending rehab, case management following.   Awaiting placement

## 2025-01-21 NOTE — ASSESSMENT & PLAN NOTE
Malnutrition Findings:   Adult Malnutrition type: Acute illness  Adult Degree of Malnutrition: Other severe protein calorie malnutrition  Malnutrition Characteristics: Inadequate energy, Weight loss                  360 Statement: related to catabolic illness, inadequate energy intake due to nausea, diarrhea, as evidenced by, 10% weight loss last months, intake < 50% estimated energy requirements for > 5 days, dark sunken orbital. Treatment: Regular diet, oral nutrition supplements.    BMI Findings:           Body mass index is 28.93 kg/m².   Continue with regular diet and nutrition supplement

## 2025-01-21 NOTE — ASSESSMENT & PLAN NOTE
Wt Readings from Last 3 Encounters:   01/21/25 86.3 kg (190 lb 4.1 oz)   12/26/24 100 kg (220 lb 14.4 oz)   12/13/24 103 kg (227 lb)   Cardiac echo on 8/2/2023 with EF 30 - 35%  Continue metoprolol, Jardiance, lasix, losartan  Daily weights, I/Os

## 2025-01-22 PROCEDURE — 97530 THERAPEUTIC ACTIVITIES: CPT

## 2025-01-22 PROCEDURE — 99232 SBSQ HOSP IP/OBS MODERATE 35: CPT | Performed by: INTERNAL MEDICINE

## 2025-01-22 RX ADMIN — ACETAMINOPHEN 975 MG: 325 TABLET, FILM COATED ORAL at 06:00

## 2025-01-22 RX ADMIN — OXYCODONE HYDROCHLORIDE 10 MG: 10 TABLET ORAL at 06:00

## 2025-01-22 RX ADMIN — METHOCARBAMOL 500 MG: 500 TABLET ORAL at 17:23

## 2025-01-22 RX ADMIN — ALLOPURINOL 100 MG: 100 TABLET ORAL at 09:20

## 2025-01-22 RX ADMIN — FERROUS SULFATE TAB 325 MG (65 MG ELEMENTAL FE) 325 MG: 325 (65 FE) TAB at 09:19

## 2025-01-22 RX ADMIN — PREDNISONE 5 MG: 5 TABLET ORAL at 09:24

## 2025-01-22 RX ADMIN — ONDANSETRON 4 MG: 2 INJECTION INTRAMUSCULAR; INTRAVENOUS at 06:00

## 2025-01-22 RX ADMIN — TAMSULOSIN HYDROCHLORIDE 0.4 MG: 0.4 CAPSULE ORAL at 17:23

## 2025-01-22 RX ADMIN — PENTOXIFYLLINE 400 MG: 400 TABLET, EXTENDED RELEASE ORAL at 17:25

## 2025-01-22 RX ADMIN — PANTOPRAZOLE SODIUM 40 MG: 40 TABLET, DELAYED RELEASE ORAL at 17:23

## 2025-01-22 RX ADMIN — ATORVASTATIN CALCIUM 80 MG: 80 TABLET, FILM COATED ORAL at 17:23

## 2025-01-22 RX ADMIN — DICLOFENAC SODIUM 2 G: 10 GEL TOPICAL at 09:22

## 2025-01-22 RX ADMIN — FUROSEMIDE 40 MG: 40 TABLET ORAL at 09:19

## 2025-01-22 RX ADMIN — SUCRALFATE 1 G: 1 TABLET ORAL at 21:45

## 2025-01-22 RX ADMIN — VENLAFAXINE 25 MG: 25 TABLET ORAL at 09:22

## 2025-01-22 RX ADMIN — GABAPENTIN 600 MG: 300 CAPSULE ORAL at 17:23

## 2025-01-22 RX ADMIN — LOSARTAN POTASSIUM 12.5 MG: 25 TABLET, FILM COATED ORAL at 09:19

## 2025-01-22 RX ADMIN — SUCRALFATE 1 G: 1 TABLET ORAL at 05:59

## 2025-01-22 RX ADMIN — SUCRALFATE 1 G: 1 TABLET ORAL at 17:23

## 2025-01-22 RX ADMIN — GABAPENTIN 600 MG: 300 CAPSULE ORAL at 09:20

## 2025-01-22 RX ADMIN — METOPROLOL SUCCINATE 25 MG: 25 TABLET, EXTENDED RELEASE ORAL at 09:20

## 2025-01-22 RX ADMIN — METHOCARBAMOL 500 MG: 500 TABLET ORAL at 21:45

## 2025-01-22 RX ADMIN — EMPAGLIFLOZIN 10 MG: 10 TABLET, FILM COATED ORAL at 09:22

## 2025-01-22 RX ADMIN — FINASTERIDE 5 MG: 5 TABLET, FILM COATED ORAL at 09:19

## 2025-01-22 RX ADMIN — ASPIRIN 81 MG CHEWABLE TABLET 81 MG: 81 TABLET CHEWABLE at 09:19

## 2025-01-22 RX ADMIN — METHOCARBAMOL 500 MG: 500 TABLET ORAL at 09:19

## 2025-01-22 RX ADMIN — PREDNISONE 5 MG: 5 TABLET ORAL at 17:23

## 2025-01-22 RX ADMIN — PANTOPRAZOLE SODIUM 40 MG: 40 TABLET, DELAYED RELEASE ORAL at 06:00

## 2025-01-22 RX ADMIN — FAMOTIDINE 20 MG: 20 TABLET, FILM COATED ORAL at 09:19

## 2025-01-22 RX ADMIN — EZETIMIBE 10 MG: 10 TABLET ORAL at 09:19

## 2025-01-22 RX ADMIN — GUAIFENESIN 600 MG: 600 TABLET, EXTENDED RELEASE ORAL at 21:45

## 2025-01-22 RX ADMIN — ACETAMINOPHEN 975 MG: 325 TABLET, FILM COATED ORAL at 21:45

## 2025-01-22 RX ADMIN — CLOPIDOGREL BISULFATE 75 MG: 75 TABLET, FILM COATED ORAL at 09:19

## 2025-01-22 RX ADMIN — ENOXAPARIN SODIUM 40 MG: 40 INJECTION SUBCUTANEOUS at 09:19

## 2025-01-22 RX ADMIN — GUAIFENESIN 600 MG: 600 TABLET, EXTENDED RELEASE ORAL at 09:19

## 2025-01-22 RX ADMIN — POTASSIUM CHLORIDE 40 MEQ: 1500 TABLET, EXTENDED RELEASE ORAL at 09:20

## 2025-01-22 RX ADMIN — OXYCODONE HYDROCHLORIDE 10 MG: 10 TABLET ORAL at 21:45

## 2025-01-22 RX ADMIN — PENTOXIFYLLINE 400 MG: 400 TABLET, EXTENDED RELEASE ORAL at 09:22

## 2025-01-22 NOTE — PHYSICAL THERAPY NOTE
PHYSICAL THERAPY NOTE          Patient Name: Royce Rene  Today's Date: 1/22/2025 01/22/25 1046   PT Last Visit   PT Visit Date 01/22/25   Note Type   Note Type Treatment   Pain Assessment   Pain Assessment Tool 0-10   Pain Score No Pain   Restrictions/Precautions   Weight Bearing Precautions Per Order Yes   RLE Weight Bearing Per Order NWB  (AKA)   LLE Weight Bearing Per Order WBAT  (sx shoe)   Braces or Orthoses Other (Comment)  (sx shoe)   Other Precautions Fall Risk;Chair Alarm;Bed Alarm;Cognitive;Multiple lines;Telemetry;Pain   General   Chart Reviewed Yes   Family/Caregiver Present No   Cognition   Overall Cognitive Status Impaired   Arousal/Participation Alert;Cooperative   Attention Attends with cues to redirect   Orientation Level Oriented X4   Memory Decreased short term memory   Following Commands Follows one step commands with increased time or repetition   Comments Patient is pleasant and cooperative   Subjective   Subjective Patient agreeable to PT tx   Bed Mobility   Supine to Sit 3  Moderate assistance   Additional items Increased time required;Verbal cues;Assist x 2;LE management;HOB elevated;Bedrails   Transfers   Sit to Stand 3  Moderate assistance   Additional items Assist x 2;Increased time required;Verbal cues   Stand to Sit 3  Moderate assistance   Additional items Assist x 2;Increased time required;Verbal cues   Stand pivot 3  Moderate assistance   Additional items Assist x 2;Increased time required;Verbal cues   Additional Comments STSx3  (x2 B/L HHA) (x1 RW)   Balance   Static Sitting Fair   Dynamic Sitting Fair -   Static Standing Poor   Dynamic Standing Poor   Ambulatory Poor -   Endurance Deficit   Endurance Deficit Yes   Endurance Deficit Description fatigue, weakness   Activity Tolerance   Activity Tolerance Patient tolerated treatment well;Patient limited by fatigue   Medical Staff Made Aware  restorative   Nurse Made Aware RN cleared   Assessment   Prognosis Fair   Problem List Decreased endurance;Impaired balance;Decreased mobility;Impaired judgement;Decreased safety awareness;Obesity;Decreased skin integrity;Pain;Decreased range of motion;Decreased strength;Orthopedic restrictions   Assessment Patient received in bed. Patient agreeable to therapy. Patient performs bed mobility with moderate assistance x2. Patient performs functional transfers with moderate assistance x2 using two hand hold x2, patient also performs transfer with Mod Ax2 at RW x1. Patient requires increased time between activities due to fatigue. Patient with poor standing balance, requiring Ax2 to maintain stance. Patient with fair sitting balance, need for Ax1 to maintain sitting.  Patient left in bedside chair with all needs met and call bell/personal items within reach. The patient's [facility] Basic Mobility Inpatient Short Form Raw Score is 7 showing further need for skilled PT services in order to improve functional mobility, decrease need for assistance, and return to PLOF. PT is recommending Level 1 - Maximum Resource Intensity on d/c from hospital.  Will continue to follow as able.   Barriers to Discharge Decreased caregiver support;Inaccessible home environment   Goals   Patient Goals to improve   PT Treatment Day 4   Plan   Treatment/Interventions ADL retraining;Functional transfer training;LE strengthening/ROM;Therapeutic exercise;Endurance training;Patient/family training;Bed mobility;Gait training;Spoke to nursing;Spoke to case management;OT   Progress Progressing toward goals   PT Frequency 2-3x/wk   Discharge Recommendation   Rehab Resource Intensity Level, PT I (Maximum Resource Intensity)   Equipment Recommended Walker;Wheelchair   [facility] Basic Mobility Inpatient   Turning in Flat Bed Without Bedrails 2   Lying on Back to Sitting on Edge of Flat Bed Without Bedrails 1   Moving Bed to Chair 1   Standing Up From Chair Using  Arms 1   Walk in Room 1   Climb 3-5 Stairs With Railing 1   Basic Mobility Inpatient Raw Score 7   Turning Head Towards Sound 4   Follow Simple Instructions 3   Low Function Basic Mobility Raw Score  14   Low Function Basic Mobility Standardized Score  22.01   [facility] Level Of Mobility   -[facility] Goal 2: Bed activities/Dependent transfer   -HL Achieved 4: Move to chair/commode   End of Consult   Patient Position at End of Consult All needs within reach;Bed/Chair alarm activated;Bedside chair     Nadine Gloria, PT, DPT

## 2025-01-22 NOTE — PLAN OF CARE
Problem: PHYSICAL THERAPY ADULT  Goal: Performs mobility at highest level of function for planned discharge setting.  See evaluation for individualized goals.  Description: Treatment/Interventions: ADL retraining, Functional transfer training, LE strengthening/ROM, Elevations, Therapeutic exercise, Endurance training, Patient/family training, Bed mobility, Gait training, Spoke to nursing, Spoke to case management, OT  Equipment Recommended: Walker       See flowsheet documentation for full assessment, interventions and recommendations.  Outcome: Progressing  Note: Prognosis: Fair  Problem List: Decreased endurance, Impaired balance, Decreased mobility, Impaired judgement, Decreased safety awareness, Obesity, Decreased skin integrity, Pain, Decreased range of motion, Decreased strength, Orthopedic restrictions  Assessment: Patient received in bed. Patient agreeable to therapy. Patient performs bed mobility with moderate assistance x2. Patient performs functional transfers with moderate assistance x2 using two hand hold x2, patient also performs transfer with Mod Ax2 at RW x1. Patient requires increased time between activities due to fatigue. Patient with poor standing balance, requiring Ax2 to maintain stance. Patient with fair sitting balance, need for Ax1 to maintain sitting.  Patient left in bedside chair with all needs met and call bell/personal items within reach. The patient's [facility]-P[facility] Basic Mobility Inpatient Short Form Raw Score is 7 showing further need for skilled PT services in order to improve functional mobility, decrease need for assistance, and return to PLOF. PT is recommending Level 1 - Maximum Resource Intensity on d/c from hospital.  Will continue to follow as able.  Barriers to Discharge: Decreased caregiver support, Inaccessible home environment     Rehab Resource Intensity Level, PT: I (Maximum Resource Intensity)    See flowsheet documentation for full assessment.

## 2025-01-22 NOTE — PROGRESS NOTES
Progress Note - Hospitalist   Name: Royce Rene 79 y.o. male I MRN: [id number]  [facility] I Date of Admission: 1/1/2025   Date of Service: 1/22/2025 I Hospital Day: 21    Assessment & Plan  PAD (peripheral artery disease) (HCC)  Patient was recently admitted to [facility] for failure to thrive and generalized weakness following chemotherapy.  Patient was pending placement to rehab facility when he was noted to have gangrene on the right foot and poor healing wound on the left.  Case was discussed with vascular surgery who recommended transfer to [place].    Patient was seen by both podiatry and vascular surgery, CT of the left lower extremity showed severe disease.  No revascularization options available, recommended right AKA which was done 1/8.  Local wound care of right stump per vascular surgery  Patient has stable wounds on the left LE without clinical signs of infection.  Continue local wound care per podiatry on the left  Weightbearing as tolerated  Continue aspirin, Plavix, statin, Trental.   Acute pain has been following, signing off 1/14.  Continue Tylenol, Robaxin, gabapentin as needed oxycodone.   PT/OT recommending rehab, case management following.   Awaiting placement  Ulcer of left foot, limited to breakdown of skin (HCC)  Pic under media.  Low suspicion for active infection  Per podiatry, no surgical plans  Status post angiogram with the intervention previously  Continue with local wound care    Chronic combined systolic and diastolic CHF (congestive heart failure) ([facility])  Wt Readings from Last 3 Encounters:   01/22/25 85.8 kg (189 lb 2.5 oz)   12/26/24 100 kg (220 lb 14.4 oz)   12/13/24 103 kg (227 lb)   Cardiac echo on 8/2/2023 with EF 30 - 35%  Continue metoprolol, Jardiance, lasix, losartan  Daily weights, I/Os  CAD (coronary artery disease)  Continue aspirin, Plavix, beta-blocker, statin  Stable  Diarrhea  Patient with diarrhea since receiving last round of chemotherapy in  December 2024.  Also with intermittent nausea/vomiting and overall poor oral intake.    Negative stool studies  GI planning outpatient follow-up to discuss colonoscopy and evaluation of elevated fecal calprotectin  Loperamide PRN, lomotil  Improving    Ambulatory dysfunction  Baseline- close to wheelchair bounded, walks minimally  Status post AKA  Plan for rehab on discharge  Prostate cancer (HCC)  History of prostate cancer follows with oncology receiving chemotherapy (last on 12/12)  Follows with Dr. Hernandez  Current regimen is Cabazitaxel, Neulasta, prednisone, Lupron, denosumab  Continue to follow in outpatient setting    Iron deficiency anemia    Results from last 7 days   Lab Units 01/19/25  0446 01/18/25  0540   HEMOGLOBIN g/dL 10.0* 10.0*   MCV fL 91 92   RDW % 15.9* 15.9*     S/p  1 unit PRBC transfusion 1/12  Continue with iron supplement  Monitor  Mixed hyperlipidemia  Continue statin  History of transcatheter aortic valve replacement (TAVR)  Noted  Severe protein-calorie malnutrition (HCC)  Malnutrition Findings:   Adult Malnutrition type: Acute illness  Adult Degree of Malnutrition: Other severe protein calorie malnutrition  Malnutrition Characteristics: Inadequate energy, Weight loss                  360 Statement: related to catabolic illness, inadequate energy intake due to nausea, diarrhea, as evidenced by, 10% weight loss last months, intake < 50% estimated energy requirements for > 5 days, dark sunken orbital. Treatment: Regular diet, oral nutrition supplements.    BMI Findings:           Body mass index is 28.76 kg/m².   Continue with regular diet and nutrition supplement    VTE Pharmacologic Prophylaxis: VTE Score: 6 High Risk (Score >/= 5) - Pharmacological DVT Prophylaxis Ordered: enoxaparin (Lovenox). Sequential Compression Devices Ordered.    Mobility:   Basic Mobility Inpatient Raw Score: 12  -[facility] Goal: 4: Move to chair/commode  -HL Achieved: 2: Bed activities/Dependent transfer  -HLM  Goal NOT achieved. Continue with multidisciplinary rounding and encourage appropriate mobility to improve upon -[facility] goals.    Patient Centered Rounds: I performed bedside rounds with nursing staff today.   Discussions with Specialists or Other Care Team Provider: CM    Education and Discussions with Family / Patient: Attempted to update  (son and niece) via phone. Unable to contact.    Current Length of Stay: 21 day(s)  Current Patient Status: Inpatient   Certification Statement: The patient will continue to require additional inpatient hospital stay due to awaiting placement      Code Status: Level 1 - Full Code    Subjective   Patient seen and examined  Comfortable in bed  No event overnight  No further diarrhea    Objective :  Temp:  [97.2 °F (36.2 °C)-98.2 °F (36.8 °C)] 97.2 °F (36.2 °C)  HR:  [87-90] 90  BP: (106-124)/(62-83) 124/82  Resp:  [15-17] 17  SpO2:  [93 %-97 %] 97 %  O2 Device: None (Room air)    Body mass index is 28.76 kg/m².     Input and Output Summary (last 24 hours):     Intake/Output Summary (Last 24 hours) at 1/22/2025 1311  Last data filed at 1/22/2025 1217  Gross per 24 hour   Intake 300 ml   Output 845 ml   Net -545 ml       Physical Exam  Patient is awake alert in no acute distress  Obese chronically ill-appearing, , pale  Lungs clear to auscultation bilateral anteriorly  Heart positive S1-S2 no murmur  Abdomen soft nontender  Status post right AKA, dressing in place  Left lower extremity dressing in place    Lines/Drains:    Central Line:  Goal for removal:  Chronic               Lab Results: I have reviewed the following results:   Results from last 7 days   Lab Units 01/19/25  0446   WBC Thousand/uL 6.92   HEMOGLOBIN g/dL 10.0*   HEMATOCRIT % 32.3*   PLATELETS Thousands/uL 442*   SEGS PCT % 75   LYMPHO PCT % 14   MONO PCT % 7   EOS PCT % 2     Results from last 7 days   Lab Units 01/19/25  0446   SODIUM mmol/L 135   POTASSIUM mmol/L 3.9   CHLORIDE mmol/L 105   CO2 mmol/L  18*   BUN mg/dL 22   CREATININE mg/dL 0.65   ANION GAP mmol/L 12   CALCIUM mg/dL 8.0*   ALBUMIN g/dL 3.4*   TOTAL BILIRUBIN mg/dL 0.47   ALK PHOS U/L 94   ALT U/L 10   AST U/L 13   GLUCOSE RANDOM mg/dL 79                       Recent Cultures (last 7 days):         Imaging Results Review: No pertinent imaging studies reviewed.  Other Study Results Review: No additional pertinent studies reviewed.    Last 24 Hours Medication List:     Current Facility-Administered Medications:     acetaminophen (TYLENOL) tablet 975 mg, Q8H DEWAYNE    allopurinol (ZYLOPRIM) tablet 100 mg, Daily    aspirin chewable tablet 81 mg, Daily    atorvastatin (LIPITOR) tablet 80 mg, Daily With Dinner    bismuth subsalicylate (PEPTO BISMOL) oral suspension 524 mg, Q6H PRN    clopidogrel (PLAVIX) tablet 75 mg, Daily    Diclofenac Sodium (VOLTAREN) 1 % topical gel 2 g, 4x Daily    diphenoxylate-atropine (LOMOTIL) 2.5-0.025 mg per tablet 1 tablet, 4x Daily PRN    Empagliflozin (JARDIANCE) tablet 10 mg, QAM    enoxaparin (LOVENOX) subcutaneous injection 40 mg, Daily    ezetimibe (ZETIA) tablet 10 mg, Daily    famotidine (PEPCID) tablet 20 mg, Daily    ferrous sulfate tablet 325 mg, Daily    finasteride (PROSCAR) tablet 5 mg, Daily    furosemide (LASIX) tablet 40 mg, Daily    gabapentin (NEURONTIN) capsule 600 mg, BID    guaiFENesin (MUCINEX) 12 hr tablet 600 mg, Q12H DEWAYNE    HYDROmorphone (DILAUDID) injection 0.5 mg, Once    HYDROmorphone (DILAUDID) injection 0.5 mg, Q4H PRN    loperamide (IMODIUM) capsule 2 mg, 4x Daily PRN    losartan (COZAAR) tablet 12.5 mg, Daily    menthol-methyl salicylate (BENGAY) 10-15 % cream, 4x Daily PRN    methocarbamol (ROBAXIN) tablet 500 mg, TID    metoprolol succinate (TOPROL-XL) 24 hr tablet 25 mg, Daily    naloxone (NARCAN) 0.04 mg/mL syringe 0.04 mg, Q1MIN PRN    ondansetron (ZOFRAN) injection 4 mg, Q6H PRN    oxyCODONE (ROXICODONE) IR tablet 5 mg, Q4H PRN **OR** oxyCODONE (ROXICODONE) immediate release tablet 10 mg,  Q4H PRN    pantoprazole (PROTONIX) EC tablet 40 mg, BID AC    pentoxifylline (TRENtal) ER tablet 400 mg, TID With Meals    potassium chloride (Klor-Con M20) CR tablet 40 mEq, Daily    predniSONE tablet 5 mg, BID With Meals    simethicone (MYLICON) chewable tablet 80 mg, Q6H PRN    sucralfate (CARAFATE) tablet 1 g, 4x Daily (AC & HS)    tamsulosin (FLOMAX) capsule 0.4 mg, Daily With Dinner    venlafaxine (EFFEXOR) tablet 25 mg, Daily    Administrative Statements   Today, Patient Was Seen By: Estevan Harper DO      **Please Note: This note may have been constructed using a voice recognition system.**

## 2025-01-22 NOTE — ASSESSMENT & PLAN NOTE
Wt Readings from Last 3 Encounters:   01/22/25 85.8 kg (189 lb 2.5 oz)   12/26/24 100 kg (220 lb 14.4 oz)   12/13/24 103 kg (227 lb)   Cardiac echo on 8/2/2023 with EF 30 - 35%  Continue metoprolol, Jardiance, lasix, losartan  Daily weights, I/Os

## 2025-01-22 NOTE — ASSESSMENT & PLAN NOTE
Results from last 7 days   Lab Units 01/19/25  0446 01/18/25  0540   HEMOGLOBIN g/dL 10.0* 10.0*   MCV fL 91 92   RDW % 15.9* 15.9*     S/p  1 unit PRBC transfusion 1/12  Continue with iron supplement  Monitor

## 2025-01-22 NOTE — ASSESSMENT & PLAN NOTE
Malnutrition Findings:   Adult Malnutrition type: Acute illness  Adult Degree of Malnutrition: Other severe protein calorie malnutrition  Malnutrition Characteristics: Inadequate energy, Weight loss                  360 Statement: related to catabolic illness, inadequate energy intake due to nausea, diarrhea, as evidenced by, 10% weight loss last months, intake < 50% estimated energy requirements for > 5 days, dark sunken orbital. Treatment: Regular diet, oral nutrition supplements.    BMI Findings:           Body mass index is 28.76 kg/m².   Continue with regular diet and nutrition supplement

## 2025-01-22 NOTE — ASSESSMENT & PLAN NOTE
Patient was recently admitted to [facility] for failure to thrive and generalized weakness following chemotherapy.  Patient was pending placement to rehab facility when he was noted to have gangrene on the right foot and poor healing wound on the left.  Case was discussed with vascular surgery who recommended transfer to [place].    Patient was seen by both podiatry and vascular surgery, CT of the left lower extremity showed severe disease.  No revascularization options available, recommended right AKA which was done 1/8.  Local wound care of right stump per vascular surgery  Patient has stable wounds on the left LE without clinical signs of infection.  Continue local wound care per podiatry on the left  Weightbearing as tolerated  Continue aspirin, Plavix, statin, Trental.   Acute pain has been following, signing off 1/14.  Continue Tylenol, Robaxin, gabapentin as needed oxycodone.   PT/OT recommending rehab, case management following.   Awaiting placement

## 2025-01-22 NOTE — PLAN OF CARE
Problem: PAIN - ADULT  Goal: Verbalizes/displays adequate comfort level or baseline comfort level  Description: Interventions:  - Encourage patient to monitor pain and request assistance  - Assess pain using appropriate pain scale  - Administer analgesics based on type and severity of pain and evaluate response  - Implement non-pharmacological measures as appropriate and evaluate response  - Consider cultural and social influences on pain and pain management  - Notify physician/advanced practitioner if interventions unsuccessful or patient reports new pain  Outcome: Progressing     Problem: INFECTION - ADULT  Goal: Absence or prevention of progression during hospitalization  Description: INTERVENTIONS:  - Assess and monitor for signs and symptoms of infection  - Monitor lab/diagnostic results  - Monitor all insertion sites, i.e. indwelling lines, tubes, and drains  - Monitor endotracheal if appropriate and nasal secretions for changes in amount and color  - Holt appropriate cooling/warming therapies per order  - Administer medications as ordered  - Instruct and encourage patient and family to use good hand hygiene technique  - Identify and instruct in appropriate isolation precautions for identified infection/condition  Outcome: Progressing  Goal: Absence of fever/infection during neutropenic period  Description: INTERVENTIONS:  - Monitor WBC    Outcome: Progressing     Problem: SAFETY ADULT  Goal: Patient will remain free of falls  Description: INTERVENTIONS:  - Educate patient/family on patient safety including physical limitations  - Instruct patient to call for assistance with activity   - Consult OT/PT to assist with strengthening/mobility   - Keep Call bell within reach  - Keep bed low and locked with side rails adjusted as appropriate  - Keep care items and personal belongings within reach  - Initiate and maintain comfort rounds  - Make Fall Risk Sign visible to staff  - Offer Toileting every  Hours,  in advance of need  - Initiate/Maintain alarm  - Obtain necessary fall risk management equipment:   - Apply yellow socks and bracelet for high fall risk patients  - Consider moving patient to room near nurses station  Outcome: Progressing  Goal: Maintain or return to baseline ADL function  Description: INTERVENTIONS:  -  Assess patient's ability to carry out ADLs; assess patient's baseline for ADL function and identify physical deficits which impact ability to perform ADLs (bathing, care of mouth/teeth, toileting, grooming, dressing, etc.)  - Assess/evaluate cause of self-care deficits   - Assess range of motion  - Assess patient's mobility; develop plan if impaired  - Assess patient's need for assistive devices and provide as appropriate  - Encourage maximum independence but intervene and supervise when necessary  - Involve family in performance of ADLs  - Assess for home care needs following discharge   - Consider OT consult to assist with ADL evaluation and planning for discharge  - Provide patient education as appropriate  Outcome: Progressing  Goal: Maintains/Returns to pre admission functional level  Description: INTERVENTIONS:  - Perform AM-PAC 6 Click Basic Mobility/ Daily Activity assessment daily.  - Set and communicate daily mobility goal to care team and patient/family/caregiver.   - Collaborate with rehabilitation services on mobility goals if consulted  - Perform Range of Motion  times a day.  - Reposition patient every  hours.  - Dangle patient  times a day  - Stand patient  times a day  - Ambulate patient  times a day  - Out of bed to chair  times a day   - Out of bed for meals  times a day  - Out of bed for toileting  - Record patient progress and toleration of activity level   Outcome: Progressing     Problem: DISCHARGE PLANNING  Goal: Discharge to home or other facility with appropriate resources  Description: INTERVENTIONS:  - Identify barriers to discharge w/patient and caregiver  - Arrange for  needed discharge resources and transportation as appropriate  - Identify discharge learning needs (meds, wound care, etc.)  - Arrange for interpretive services to assist at discharge as needed  - Refer to Case Management Department for coordinating discharge planning if the patient needs post-hospital services based on physician/advanced practitioner order or complex needs related to functional status, cognitive ability, or social support system  Outcome: Progressing     Problem: Knowledge Deficit  Goal: Patient/family/caregiver demonstrates understanding of disease process, treatment plan, medications, and discharge instructions  Description: Complete learning assessment and assess knowledge base.  Interventions:  - Provide teaching at level of understanding  - Provide teaching via preferred learning methods  Outcome: Progressing     Problem: Prexisting or High Potential for Compromised Skin Integrity  Goal: Skin integrity is maintained or improved  Description: INTERVENTIONS:  - Identify patients at risk for skin breakdown  - Assess and monitor skin integrity  - Assess and monitor nutrition and hydration status  - Monitor labs   - Assess for incontinence   - Turn and reposition patient  - Assist with mobility/ambulation  - Relieve pressure over bony prominences  - Avoid friction and shearing  - Provide appropriate hygiene as needed including keeping skin clean and dry  - Evaluate need for skin moisturizer/barrier cream  - Collaborate with interdisciplinary team   - Patient/family teaching  - Consider wound care consult   Outcome: Progressing     Problem: Nutrition/Hydration-ADULT  Goal: Nutrient/Hydration intake appropriate for improving, restoring or maintaining nutritional needs  Description: Monitor and assess patient's nutrition/hydration status for malnutrition. Collaborate with interdisciplinary team and initiate plan and interventions as ordered.  Monitor patient's weight and dietary intake as ordered or  per policy. Utilize nutrition screening tool and intervene as necessary. Determine patient's food preferences and provide high-protein, high-caloric foods as appropriate.     INTERVENTIONS:  - Monitor oral intake, urinary output, labs, and treatment plans  - Assess nutrition and hydration status and recommend course of action  - Evaluate amount of meals eaten  - Assist patient with eating if necessary   - Allow adequate time for meals  - Recommend/ encourage appropriate diets, oral nutritional supplements, and vitamin/mineral supplements  - Order, calculate, and assess calorie counts as needed  - Recommend, monitor, and adjust tube feedings and TPN/PPN based on assessed needs  - Assess need for intravenous fluids  - Provide specific nutrition/hydration education as appropriate  - Include patient/family/caregiver in decisions related to nutrition  Outcome: Progressing

## 2025-01-23 ENCOUNTER — HOSPITAL ENCOUNTER (OUTPATIENT)
Dept: INFUSION CENTER | Facility: CLINIC | Age: 80
Discharge: HOME | End: 2025-01-23

## 2025-01-23 LAB
ANION GAP SERPL CALCULATED.3IONS-SCNC: 10 MMOL/L (ref 4–13)
BASOPHILS # BLD AUTO: 0.03 THOUSANDS/ΜL (ref 0–0.1)
BASOPHILS NFR BLD AUTO: 0 % (ref 0–1)
BUN SERPL-MCNC: 22 MG/DL (ref 5–25)
CALCIUM SERPL-MCNC: 8.7 MG/DL (ref 8.4–10.2)
CHLORIDE SERPL-SCNC: 104 MMOL/L (ref 96–108)
CO2 SERPL-SCNC: 20 MMOL/L (ref 21–32)
CREAT SERPL-MCNC: 0.74 MG/DL (ref 0.6–1.3)
EOSINOPHIL # BLD AUTO: 0.18 THOUSAND/ΜL (ref 0–0.61)
EOSINOPHIL NFR BLD AUTO: 3 % (ref 0–6)
ERYTHROCYTE [DISTWIDTH] IN BLOOD BY AUTOMATED COUNT: 16.4 % (ref 11.6–15.1)
GFR SERPL CREATININE-BSD FRML MDRD: 87 ML/MIN/1.73SQ M
GLUCOSE SERPL-MCNC: 97 MG/DL (ref 65–140)
HCT VFR BLD AUTO: 33.4 % (ref 36.5–49.3)
HGB BLD-MCNC: 10.3 G/DL (ref 12–17)
IMM GRANULOCYTES # BLD AUTO: 0.07 THOUSAND/UL (ref 0–0.2)
IMM GRANULOCYTES NFR BLD AUTO: 1 % (ref 0–2)
LYMPHOCYTES # BLD AUTO: 0.89 THOUSANDS/ΜL (ref 0.6–4.47)
LYMPHOCYTES NFR BLD AUTO: 13 % (ref 14–44)
MAGNESIUM SERPL-MCNC: 1.9 MG/DL (ref 1.9–2.7)
MCH RBC QN AUTO: 28.5 PG (ref 26.8–34.3)
MCHC RBC AUTO-ENTMCNC: 30.8 G/DL (ref 31.4–37.4)
MCV RBC AUTO: 92 FL (ref 82–98)
MONOCYTES # BLD AUTO: 0.5 THOUSAND/ΜL (ref 0.17–1.22)
MONOCYTES NFR BLD AUTO: 7 % (ref 4–12)
NEUTROPHILS # BLD AUTO: 5.17 THOUSANDS/ΜL (ref 1.85–7.62)
NEUTS SEG NFR BLD AUTO: 76 % (ref 43–75)
NRBC BLD AUTO-RTO: 0 /100 WBCS
PLATELET # BLD AUTO: 379 THOUSANDS/UL (ref 149–390)
PMV BLD AUTO: 9.6 FL (ref 8.9–12.7)
POTASSIUM SERPL-SCNC: 4.2 MMOL/L (ref 3.5–5.3)
RBC # BLD AUTO: 3.62 MILLION/UL (ref 3.88–5.62)
SODIUM SERPL-SCNC: 134 MMOL/L (ref 135–147)
WBC # BLD AUTO: 6.84 THOUSAND/UL (ref 4.31–10.16)

## 2025-01-23 PROCEDURE — 83735 ASSAY OF MAGNESIUM: CPT | Performed by: INTERNAL MEDICINE

## 2025-01-23 PROCEDURE — 80048 BASIC METABOLIC PNL TOTAL CA: CPT | Performed by: INTERNAL MEDICINE

## 2025-01-23 PROCEDURE — 97530 THERAPEUTIC ACTIVITIES: CPT

## 2025-01-23 PROCEDURE — 99232 SBSQ HOSP IP/OBS MODERATE 35: CPT | Performed by: INTERNAL MEDICINE

## 2025-01-23 PROCEDURE — 97535 SELF CARE MNGMENT TRAINING: CPT

## 2025-01-23 PROCEDURE — 85025 COMPLETE CBC W/AUTO DIFF WBC: CPT | Performed by: INTERNAL MEDICINE

## 2025-01-23 RX ORDER — METHOCARBAMOL 500 MG/1
500 TABLET, FILM COATED ORAL EVERY 6 HOURS PRN
Status: DISCONTINUED | OUTPATIENT
Start: 2025-01-23 | End: 2025-01-24 | Stop reason: HOSPADM

## 2025-01-23 RX ADMIN — ATORVASTATIN CALCIUM 80 MG: 80 TABLET, FILM COATED ORAL at 16:01

## 2025-01-23 RX ADMIN — DICLOFENAC SODIUM 2 G: 10 GEL TOPICAL at 12:07

## 2025-01-23 RX ADMIN — METOPROLOL SUCCINATE 25 MG: 25 TABLET, EXTENDED RELEASE ORAL at 08:58

## 2025-01-23 RX ADMIN — GABAPENTIN 600 MG: 300 CAPSULE ORAL at 17:53

## 2025-01-23 RX ADMIN — POTASSIUM CHLORIDE 40 MEQ: 1500 TABLET, EXTENDED RELEASE ORAL at 08:58

## 2025-01-23 RX ADMIN — SUCRALFATE 1 G: 1 TABLET ORAL at 22:04

## 2025-01-23 RX ADMIN — OXYCODONE HYDROCHLORIDE 10 MG: 10 TABLET ORAL at 16:05

## 2025-01-23 RX ADMIN — FAMOTIDINE 20 MG: 20 TABLET, FILM COATED ORAL at 08:52

## 2025-01-23 RX ADMIN — GABAPENTIN 600 MG: 300 CAPSULE ORAL at 08:53

## 2025-01-23 RX ADMIN — ENOXAPARIN SODIUM 40 MG: 40 INJECTION SUBCUTANEOUS at 08:52

## 2025-01-23 RX ADMIN — PENTOXIFYLLINE 400 MG: 400 TABLET, EXTENDED RELEASE ORAL at 08:53

## 2025-01-23 RX ADMIN — DICLOFENAC SODIUM 2 G: 10 GEL TOPICAL at 09:06

## 2025-01-23 RX ADMIN — ACETAMINOPHEN 975 MG: 325 TABLET, FILM COATED ORAL at 04:58

## 2025-01-23 RX ADMIN — ONDANSETRON 4 MG: 2 INJECTION INTRAMUSCULAR; INTRAVENOUS at 10:51

## 2025-01-23 RX ADMIN — DICLOFENAC SODIUM 2 G: 10 GEL TOPICAL at 17:53

## 2025-01-23 RX ADMIN — GUAIFENESIN 600 MG: 600 TABLET, EXTENDED RELEASE ORAL at 22:04

## 2025-01-23 RX ADMIN — ASPIRIN 81 MG CHEWABLE TABLET 81 MG: 81 TABLET CHEWABLE at 08:52

## 2025-01-23 RX ADMIN — SUCRALFATE 1 G: 1 TABLET ORAL at 16:01

## 2025-01-23 RX ADMIN — PENTOXIFYLLINE 400 MG: 400 TABLET, EXTENDED RELEASE ORAL at 12:08

## 2025-01-23 RX ADMIN — PANTOPRAZOLE SODIUM 40 MG: 40 TABLET, DELAYED RELEASE ORAL at 16:02

## 2025-01-23 RX ADMIN — EMPAGLIFLOZIN 10 MG: 10 TABLET, FILM COATED ORAL at 08:53

## 2025-01-23 RX ADMIN — FUROSEMIDE 40 MG: 40 TABLET ORAL at 08:53

## 2025-01-23 RX ADMIN — FERROUS SULFATE TAB 325 MG (65 MG ELEMENTAL FE) 325 MG: 325 (65 FE) TAB at 08:52

## 2025-01-23 RX ADMIN — SUCRALFATE 1 G: 1 TABLET ORAL at 08:58

## 2025-01-23 RX ADMIN — ALLOPURINOL 100 MG: 100 TABLET ORAL at 08:52

## 2025-01-23 RX ADMIN — PREDNISONE 5 MG: 5 TABLET ORAL at 08:58

## 2025-01-23 RX ADMIN — PENTOXIFYLLINE 400 MG: 400 TABLET, EXTENDED RELEASE ORAL at 15:42

## 2025-01-23 RX ADMIN — PANTOPRAZOLE SODIUM 40 MG: 40 TABLET, DELAYED RELEASE ORAL at 09:03

## 2025-01-23 RX ADMIN — SUCRALFATE 1 G: 1 TABLET ORAL at 12:12

## 2025-01-23 RX ADMIN — EZETIMIBE 10 MG: 10 TABLET ORAL at 08:52

## 2025-01-23 RX ADMIN — OXYCODONE HYDROCHLORIDE 10 MG: 10 TABLET ORAL at 12:12

## 2025-01-23 RX ADMIN — METHOCARBAMOL 500 MG: 500 TABLET ORAL at 08:58

## 2025-01-23 RX ADMIN — VENLAFAXINE 25 MG: 25 TABLET ORAL at 08:53

## 2025-01-23 RX ADMIN — HYDROMORPHONE HYDROCHLORIDE 0.5 MG: 1 INJECTION, SOLUTION INTRAMUSCULAR; INTRAVENOUS; SUBCUTANEOUS at 18:02

## 2025-01-23 RX ADMIN — ACETAMINOPHEN 975 MG: 325 TABLET, FILM COATED ORAL at 13:56

## 2025-01-23 RX ADMIN — LOSARTAN POTASSIUM 12.5 MG: 25 TABLET, FILM COATED ORAL at 08:58

## 2025-01-23 RX ADMIN — ACETAMINOPHEN 975 MG: 325 TABLET, FILM COATED ORAL at 22:05

## 2025-01-23 RX ADMIN — CLOPIDOGREL BISULFATE 75 MG: 75 TABLET, FILM COATED ORAL at 08:52

## 2025-01-23 RX ADMIN — FINASTERIDE 5 MG: 5 TABLET, FILM COATED ORAL at 08:53

## 2025-01-23 RX ADMIN — PREDNISONE 5 MG: 5 TABLET ORAL at 16:01

## 2025-01-23 RX ADMIN — GUAIFENESIN 600 MG: 600 TABLET, EXTENDED RELEASE ORAL at 08:58

## 2025-01-23 RX ADMIN — TAMSULOSIN HYDROCHLORIDE 0.4 MG: 0.4 CAPSULE ORAL at 16:02

## 2025-01-23 NOTE — PHYSICAL THERAPY NOTE
PHYSICAL THERAPY NOTE          Patient Name: Royce Rene  Today's Date: 1/23/2025 01/23/25 1330   PT Last Visit   PT Visit Date 01/23/25   Note Type   Note Type Treatment   Pain Assessment   Pain Assessment Tool 0-10   Pain Score 8   Pain Location/Orientation Location: Leg;Location: Incision;Orientation: Right   Pain Onset/Description Onset: Ongoing;Frequency: Constant/Continuous;Descriptor: Discomfort   Effect of Pain on Daily Activities limits comfort and mobility   Patient's Stated Pain Goal No pain   Hospital Pain Intervention(s) Repositioned;Ambulation/increased activity;Emotional support   Restrictions/Precautions   Weight Bearing Precautions Per Order Yes   RLE Weight Bearing Per Order NWB  (AKA)   LLE Weight Bearing Per Order WBAT   Braces or Orthoses Other (Comment)  (sx shoe)   Other Precautions Fall Risk;Chair Alarm;Bed Alarm;WBS;Cognitive  (RLE AKA)   General   Chart Reviewed Yes   Family/Caregiver Present No   Cognition   Overall Cognitive Status Impaired   Arousal/Participation Alert;Cooperative   Attention Attends with cues to redirect   Orientation Level Oriented to person;Oriented to place;Oriented to situation;Disoriented to time   Memory Decreased short term memory   Following Commands Follows one step commands with increased time or repetition   Comments Patient is pleasant and cooperative   Subjective   Subjective Patient agreeable to PT tx   Bed Mobility   Rolling R 5  Supervision   Additional items Increased time required;Verbal cues;Bedrails   Rolling L 5  Supervision   Additional items Increased time required;Verbal cues;Bedrails   Supine to Sit 3  Moderate assistance   Additional items Assist x 1;Increased time required;Verbal cues;LE management;HOB elevated;Bedrails   Sit to Supine 3  Moderate assistance   Additional items Assist x 1;Increased time required;Verbal cues;LE management;HOB elevated;Bedrails    Transfers   Sit to Stand 3  Moderate assistance   Additional items Assist x 2;Increased time required;Verbal cues   Stand to Sit 3  Moderate assistance   Additional items Assist x 2;Increased time required;Verbal cues   Additional Comments STS x3 -- declines OOB to chair   Balance   Static Sitting Fair   Dynamic Sitting Fair -   Static Standing Poor   Dynamic Standing Poor   Ambulatory Poor -   Endurance Deficit   Endurance Deficit Yes   Endurance Deficit Description fatigue, weakness   Activity Tolerance   Activity Tolerance Patient limited by fatigue  (anxiety - dizziness)   Medical Staff Made Aware OT   Nurse Made Aware RN cleared   Exercises   Balance training  standing balance poor - requires Mod Ax2 to maintain stance. Standing tolerance about 30 seconds.   Assessment   Prognosis Fair   Problem List Decreased endurance;Impaired balance;Decreased mobility;Impaired judgement;Decreased safety awareness;Obesity;Decreased skin integrity;Pain;Decreased range of motion;Decreased strength;Orthopedic restrictions   Assessment Patient received in bed. Patient agreeable to therapy. Patient performs bed mobility with moderate assistance x1. Patient performs functional transfers with moderate assistance x2 using rolling walker. Focus on standing tolerance and standing tolerance this date. Patient performs static/dynamic standing balance with rolling walker. Please see flowsheet for balance activities performed.  Patient c/o dizziness with transitional movements, but BP WFL throughout. Patient declines OOB to chair at this time. Patient returned to supine with Mod Ax1. Patient noted to be incontinent of bowels upon movement.  Patient performs rolling L/R with supervision for hygiene and linen change. Patient left in bed with all needs met and call bell/personal items within reach. The patient's AM-PAC Basic Mobility Inpatient Short Form Raw Score is 9 showing further need for skilled PT services in order to improve  functional mobility, decrease need for assistance, and return to PLOF. PT is recommending Level 1 - Maximum Resource Intensity on d/c from hospital.  Will continue to follow as able.   Barriers to Discharge Decreased caregiver support;Inaccessible home environment   Goals   Patient Goals to get stronger   PT Treatment Day 5   Plan   Treatment/Interventions Functional transfer training;ADL retraining;LE strengthening/ROM;Elevations;Therapeutic exercise;Endurance training;Patient/family training;Equipment eval/education;Bed mobility;Gait training;Spoke to nursing;Spoke to case management;OT;Cognitive reorientation   Progress Slow progress, decreased activity tolerance   PT Frequency 2-3x/wk   Discharge Recommendation   Rehab Resource Intensity Level, PT I (Maximum Resource Intensity)   AM-PAC Basic Mobility Inpatient   Turning in Flat Bed Without Bedrails 3   Lying on Back to Sitting on Edge of Flat Bed Without Bedrails 2   Moving Bed to Chair 1   Standing Up From Chair Using Arms 1   Walk in Room 1   Climb 3-5 Stairs With Railing 1   Basic Mobility Inpatient Raw Score 9   Turning Head Towards Sound 4   Follow Simple Instructions 3   Low Function Basic Mobility Raw Score  16   Low Function Basic Mobility Standardized Score  25.72   [facility] Highest Level Of Mobility   -HL Goal 3: Sit at edge of bed   -HLM Achieved 3: Sit at edge of bed   End of Consult   Patient Position at End of Consult All needs within reach;Bed/Chair alarm activated;Supine     Nadine Gloria, PT, DPT

## 2025-01-23 NOTE — PLAN OF CARE
Problem: OCCUPATIONAL THERAPY ADULT  Goal: Performs self-care activities at highest level of function for planned discharge setting.  See evaluation for individualized goals.  Description: Treatment Interventions: ADL retraining, Functional transfer training, UE strengthening/ROM, Endurance training, Cognitive reorientation, Patient/family training, Equipment evaluation/education, Compensatory technique education, Continued evaluation, Energy conservation, Activityengagement          See flowsheet documentation for full assessment, interventions and recommendations.   Outcome: Progressing  Note: Limitation: Decreased ADL status, Decreased UE strength, Decreased Safe judgement during ADL, Decreased cognition, Decreased endurance, Decreased self-care trans, Decreased high-level ADLs  Prognosis: Good  Assessment: Pt seen for skilled OT treatment session on this date w/ interventions focusing on ADL participation, bed mobility , and transfer skills. Pt was agreeable and willing to participate in session. Pt engaged in the following tasks: Supervision grooming, rolling; Min A UB dressing; Mod A bed mobility and transfers; Max A LB dressing, toileting. In comparison to previous session, pt demonstrated improvements in activity tolerance and standing tolerance. Pt required frequent re direction, verbal cues for safety, verbal cues for correct technique, one step directives, and frequent rest periods. Pt continues to be functioning below baseline level as occupational performance remains limited by decreased ADL status, decreased activity tolerance, decreased endurance, decreased sitting tolerance, decreased sitting balance, decreased standing tolerance, decreased standing balance, decreased transfer skills, decreased fxnl mobility, decreased safety awareness, decreased insight into deficits, pain, generalized weakness , and AKA . The patient's raw score on the -PAC Daily Activity Inpatient Short Form is 16. A raw score  of less than 19 suggests the patient may benefit from discharge to post-acute rehabilitation services. Please refer to the recommendation of the Occupational Therapist for safe discharge planning.    From OT standpoint, recommend Level I (Maximum Resource Intensity) at time of d/c. Pt will benefit from continued OT treatment while in acute care to address deficits as defined above and maximize level of functional independence with ADLs and functional mobility. Pt left supine in bed w/ alarm on and all needs within reach at end of session.     Rehab Resource Intensity Level, OT: I (Maximum Resource Intensity)

## 2025-01-23 NOTE — PLAN OF CARE
Problem: PHYSICAL THERAPY ADULT  Goal: Performs mobility at highest level of function for planned discharge setting.  See evaluation for individualized goals.  Description: Treatment/Interventions: ADL retraining, Functional transfer training, LE strengthening/ROM, Elevations, Therapeutic exercise, Endurance training, Patient/family training, Bed mobility, Gait training, Spoke to nursing, Spoke to case management, OT  Equipment Recommended: Walker       See flowsheet documentation for full assessment, interventions and recommendations.  Outcome: Progressing  Note: Prognosis: Fair  Problem List: Decreased endurance, Impaired balance, Decreased mobility, Impaired judgement, Decreased safety awareness, Obesity, Decreased skin integrity, Pain, Decreased range of motion, Decreased strength, Orthopedic restrictions  Assessment: Patient received in bed. Patient agreeable to therapy. Patient performs bed mobility with moderate assistance x1. Patient performs functional transfers with moderate assistance x2 using rolling walker. Focus on standing tolerance and standing tolerance this date. Patient performs static/dynamic standing balance with rolling walker. Please see flowsheet for balance activities performed.  Patient c/o dizziness with transitional movements, but BP WFL throughout. Patient declines OOB to chair at this time. Patient returned to supine with Mod Ax1. Patient noted to be incontinent of bowels upon movement.  Patient performs rolling L/R with supervision for hygiene and linen change. Patient left in bed with all needs met and call bell/personal items within reach. The patient's AM-PAC Basic Mobility Inpatient Short Form Raw Score is 9 showing further need for skilled PT services in order to improve functional mobility, decrease need for assistance, and return to PLOF. PT is recommending Level 1 - Maximum Resource Intensity on d/c from hospital.  Will continue to follow as able.  Barriers to Discharge:  Decreased caregiver support, Inaccessible home environment     Rehab Resource Intensity Level, PT: I (Maximum Resource Intensity)    See flowsheet documentation for full assessment.

## 2025-01-23 NOTE — ASSESSMENT & PLAN NOTE
Results from last 7 days   Lab Units 01/23/25  0451 01/19/25  0446 01/18/25  0540   HEMOGLOBIN g/dL 10.3* 10.0* 10.0*   MCV fL 92 91 92   RDW % 16.4* 15.9* 15.9*     S/p  1 unit PRBC transfusion 1/12  Continue with iron supplement  Monitor

## 2025-01-23 NOTE — PROGRESS NOTES
Patient:  CAREN JACK    MRN:  [id number]    Aidin Request ID:  [id number]    Level of care reserved:  Skilled Nursing Facility    Partner Reserved:  [facility]  , [place] [id number] [phone]    Clinical needs requested:    Geography searched:  20 miles around [id number]    Start of Service:    Request sent:  3:02pm EST on 1/17/2025 by Jose David Elias    Partner reserved:  3:08pm EST on 1/23/2025 by Jose David Elias    Choice list shared:  3:06pm EST on 1/23/2025 by Jose David Elias

## 2025-01-23 NOTE — PROGRESS NOTES
Progress Note - Hospitalist   Name: Royce Rene 79 y.o. male I MRN: [id number]  [facility] I Date of Admission: 1/1/2025   Date of Service: 1/23/2025 I Hospital Day: 22    Assessment & Plan  PAD (peripheral artery disease) (HCC)  Patient was recently admitted to [facility] for failure to thrive and generalized weakness following chemotherapy.  Patient was pending placement to rehab facility when he was noted to have gangrene on the right foot and poor healing wound on the left.  Case was discussed with vascular surgery who recommended transfer to [place].    Patient was seen by both podiatry and vascular surgery, CT of the left lower extremity showed severe disease.  No revascularization options available, recommended right AKA which was done 1/8.  Local wound care of right stump per vascular surgery  Patient has stable wounds on the left LE without clinical signs of infection.  Continue local wound care per podiatry on the left  Weightbearing as tolerated  Continue aspirin, Plavix, statin, Trental.   Acute pain has been following, signing off 1/14.  Continue Tylenol, Robaxin, gabapentin as needed oxycodone.   PT/OT recommending rehab, case management following.   Awaiting placement  Ulcer of left foot, limited to breakdown of skin (HCC)  Pic under media.  Low suspicion for active infection  Per podiatry, no surgical plans  Status post angiogram with the intervention previously  Continue with local wound care    Chronic combined systolic and diastolic CHF (congestive heart failure) ([facility])  Wt Readings from Last 3 Encounters:   01/23/25 88.4 kg (194 lb 14.2 oz)   12/26/24 100 kg (220 lb 14.4 oz)   12/13/24 103 kg (227 lb)   Cardiac echo on 8/2/2023 with EF 30 - 35%  Continue metoprolol, Jardiance, lasix, losartan  Daily weights, I/Os  CAD (coronary artery disease)  Continue aspirin, Plavix, beta-blocker, statin  Stable  Diarrhea  Patient with diarrhea since receiving last round of chemotherapy in  December 2024.  Also with intermittent nausea/vomiting and overall poor oral intake.    Negative stool studies  GI planning outpatient follow-up to discuss colonoscopy and evaluation of elevated fecal calprotectin  Loperamide PRN, lomotil  Resolved    Ambulatory dysfunction  Baseline- close to wheelchair bounded, walks minimally  Status post AKA  Plan for rehab on discharge  Prostate cancer (HCC)  History of prostate cancer follows with oncology receiving chemotherapy (last on 12/12)  Follows with Dr. Hernandez  Current regimen is Cabazitaxel, Neulasta, prednisone, Lupron, denosumab  Continue to follow in outpatient setting    Iron deficiency anemia    Results from last 7 days   Lab Units 01/23/25  0451 01/19/25  0446 01/18/25  0540   HEMOGLOBIN g/dL 10.3* 10.0* 10.0*   MCV fL 92 91 92   RDW % 16.4* 15.9* 15.9*     S/p  1 unit PRBC transfusion 1/12  Continue with iron supplement  Monitor  Mixed hyperlipidemia  Continue statin  History of transcatheter aortic valve replacement (TAVR)  Noted  Severe protein-calorie malnutrition (HCC)  Malnutrition Findings:   Adult Malnutrition type: Acute illness  Adult Degree of Malnutrition: Other severe protein calorie malnutrition  Malnutrition Characteristics: Inadequate energy, Weight loss                  360 Statement: related to catabolic illness, inadequate energy intake due to nausea, diarrhea, as evidenced by, 10% weight loss last months, intake < 50% estimated energy requirements for > 5 days, dark sunken orbital. Treatment: Regular diet, oral nutrition supplements.    BMI Findings:           Body mass index is 29.63 kg/m².   Continue with regular diet and nutrition supplement    VTE Pharmacologic Prophylaxis: VTE Score: 6 High Risk (Score >/= 5) - Pharmacological DVT Prophylaxis Ordered: enoxaparin (Lovenox). Sequential Compression Devices Ordered.    Mobility:   Basic Mobility Inpatient Raw Score: 9  -[facility] Goal: 3: Sit at edge of bed  -[facility] Achieved: 3: Sit at edge of  bed  [facility] Goal achieved. Continue to encourage appropriate mobility.    Patient Centered Rounds: I performed bedside rounds with nursing staff today.   Discussions with Specialists or Other Care Team Provider: CM    Education and Discussions with Family / Patient:  Patient.     Current Length of Stay: 22 day(s)  Current Patient Status: Inpatient   Certification Statement: The patient will continue to require additional inpatient hospital stay due to awaiting rehab placement  Discharge Plan: Anticipate discharge tomorrow to rehab facility.    Code Status: Level 1 - Full Code    Subjective   Patient seen and examined  Comfortable in bed  No event overnight  No further diarrhea    Objective :  Temp:  [98 °F (36.7 °C)-98.1 °F (36.7 °C)] 98 °F (36.7 °C)  HR:  [74-88] 85  BP: ()/(56-79) 118/75  Resp:  [17-18] 17  SpO2:  [95 %-98 %] 95 %  O2 Device: None (Room air)    Body mass index is 29.63 kg/m².     Input and Output Summary (last 24 hours):     Intake/Output Summary (Last 24 hours) at 1/23/2025 1523  Last data filed at 1/23/2025 1200  Gross per 24 hour   Intake 1680 ml   Output 726 ml   Net 954 ml       Physical Exam  Patient is awake alert in no acute distress  Obese chronically ill-appearing, , pale  Lungs clear to auscultation bilateral anteriorly  Heart positive S1-S2 no murmur  Abdomen soft nontender  Status post right AKA, dressing in place  Left lower extremity dressing in place    Lines/Drains:    Central Line:  Goal for removal:  Chronic               Lab Results: I have reviewed the following results:   Results from last 7 days   Lab Units 01/23/25  0451   WBC Thousand/uL 6.84   HEMOGLOBIN g/dL 10.3*   HEMATOCRIT % 33.4*   PLATELETS Thousands/uL 379   SEGS PCT % 76*   LYMPHO PCT % 13*   MONO PCT % 7   EOS PCT % 3     Results from last 7 days   Lab Units 01/23/25  0451 01/19/25  0446   SODIUM mmol/L 134* 135   POTASSIUM mmol/L 4.2 3.9   CHLORIDE mmol/L 104 105   CO2 mmol/L 20* 18*   BUN mg/dL 22 22    CREATININE mg/dL 0.74 0.65   ANION GAP mmol/L 10 12   CALCIUM mg/dL 8.7 8.0*   ALBUMIN g/dL  --  3.4*   TOTAL BILIRUBIN mg/dL  --  0.47   ALK PHOS U/L  --  94   ALT U/L  --  10   AST U/L  --  13   GLUCOSE RANDOM mg/dL 97 79                       Recent Cultures (last 7 days):         Imaging Results Review: No pertinent imaging studies reviewed.  Other Study Results Review: No additional pertinent studies reviewed.    Last 24 Hours Medication List:     Current Facility-Administered Medications:     acetaminophen (TYLENOL) tablet 975 mg, Q8H DEWAYNE    allopurinol (ZYLOPRIM) tablet 100 mg, Daily    aspirin chewable tablet 81 mg, Daily    atorvastatin (LIPITOR) tablet 80 mg, Daily With Dinner    bismuth subsalicylate (PEPTO BISMOL) oral suspension 524 mg, Q6H PRN    clopidogrel (PLAVIX) tablet 75 mg, Daily    Diclofenac Sodium (VOLTAREN) 1 % topical gel 2 g, 4x Daily    diphenoxylate-atropine (LOMOTIL) 2.5-0.025 mg per tablet 1 tablet, 4x Daily PRN    Empagliflozin (JARDIANCE) tablet 10 mg, QAM    enoxaparin (LOVENOX) subcutaneous injection 40 mg, Daily    ezetimibe (ZETIA) tablet 10 mg, Daily    famotidine (PEPCID) tablet 20 mg, Daily    ferrous sulfate tablet 325 mg, Daily    finasteride (PROSCAR) tablet 5 mg, Daily    furosemide (LASIX) tablet 40 mg, Daily    gabapentin (NEURONTIN) capsule 600 mg, BID    guaiFENesin (MUCINEX) 12 hr tablet 600 mg, Q12H DEWAYNE    HYDROmorphone (DILAUDID) injection 0.5 mg, Once    HYDROmorphone (DILAUDID) injection 0.5 mg, Q4H PRN    loperamide (IMODIUM) capsule 2 mg, 4x Daily PRN    losartan (COZAAR) tablet 12.5 mg, Daily    menthol-methyl salicylate (BENGAY) 10-15 % cream, 4x Daily PRN    methocarbamol (ROBAXIN) tablet 500 mg, TID    metoprolol succinate (TOPROL-XL) 24 hr tablet 25 mg, Daily    naloxone (NARCAN) 0.04 mg/mL syringe 0.04 mg, Q1MIN PRN    ondansetron (ZOFRAN) injection 4 mg, Q6H PRN    oxyCODONE (ROXICODONE) IR tablet 5 mg, Q4H PRN **OR** oxyCODONE (ROXICODONE) immediate  release tablet 10 mg, Q4H PRN    pantoprazole (PROTONIX) EC tablet 40 mg, BID AC    pentoxifylline (TRENtal) ER tablet 400 mg, TID With Meals    potassium chloride (Klor-Con M20) CR tablet 40 mEq, Daily    predniSONE tablet 5 mg, BID With Meals    simethicone (MYLICON) chewable tablet 80 mg, Q6H PRN    sucralfate (CARAFATE) tablet 1 g, 4x Daily (AC & HS)    tamsulosin (FLOMAX) capsule 0.4 mg, Daily With Dinner    venlafaxine (EFFEXOR) tablet 25 mg, Daily    Administrative Statements   Today, Patient Was Seen By: Estevan Harper DO  \    **Please Note: This note may have been constructed using a voice recognition system.**

## 2025-01-23 NOTE — CASE MANAGEMENT
[facility] received request for authorization from Care Manager.  Authorization request submitted for: [facility]  Facility Name: [facility]  NPI:  [id number]  Facility MD: Kiley Tomas  NPI: [id number]  Authorization initiated by contacting insurance:  [employer]  Via: [employer] Portal   Clinicals submitted via [employer] attachment   Pending Reference #: [id number]    Care Manager notified: Jose David Elias    Updates to authorization status will be noted in chart. Please reach out to CM for updates on any clinical information.

## 2025-01-23 NOTE — PLAN OF CARE
Problem: PAIN - ADULT  Goal: Verbalizes/displays adequate comfort level or baseline comfort level  Description: Interventions:  - Encourage patient to monitor pain and request assistance  - Assess pain using appropriate pain scale  - Administer analgesics based on type and severity of pain and evaluate response  - Implement non-pharmacological measures as appropriate and evaluate response  - Consider cultural and social influences on pain and pain management  - Notify physician/advanced practitioner if interventions unsuccessful or patient reports new pain  Outcome: Progressing     Problem: INFECTION - ADULT  Goal: Absence or prevention of progression during hospitalization  Description: INTERVENTIONS:  - Assess and monitor for signs and symptoms of infection  - Monitor lab/diagnostic results  - Monitor all insertion sites, i.e. indwelling lines, tubes, and drains  - Monitor endotracheal if appropriate and nasal secretions for changes in amount and color  - [place] appropriate cooling/warming therapies per order  - Administer medications as ordered  - Instruct and encourage patient and family to use good hand hygiene technique  - Identify and instruct in appropriate isolation precautions for identified infection/condition  Outcome: Progressing  Goal: Absence of fever/infection during neutropenic period  Description: INTERVENTIONS:  - Monitor WBC    Outcome: Progressing     Problem: SAFETY ADULT  Goal: Patient will remain free of falls  Description: INTERVENTIONS:  - Educate patient/family on patient safety including physical limitations  - Instruct patient to call for assistance with activity   - Consult OT/PT to assist with strengthening/mobility   - Keep Call bell within reach  - Keep bed low and locked with side rails adjusted as appropriate  - Keep care items and personal belongings within reach  - Initiate and maintain comfort rounds  - Make Fall Risk Sign visible to staff  - Offer Toileting every  Hours,  in advance of need  - Initiate/Maintain alarm  - Obtain necessary fall risk management equipment:   - Apply yellow socks and bracelet for high fall risk patients  - Consider moving patient to room near nurses station  Outcome: Progressing  Goal: Maintain or return to baseline ADL function  Description: INTERVENTIONS:  -  Assess patient's ability to carry out ADLs; assess patient's baseline for ADL function and identify physical deficits which impact ability to perform ADLs (bathing, care of mouth/teeth, toileting, grooming, dressing, etc.)  - Assess/evaluate cause of self-care deficits   - Assess range of motion  - Assess patient's mobility; develop plan if impaired  - Assess patient's need for assistive devices and provide as appropriate  - Encourage maximum independence but intervene and supervise when necessary  - Involve family in performance of ADLs  - Assess for home care needs following discharge   - Consider OT consult to assist with ADL evaluation and planning for discharge  - Provide patient education as appropriate  Outcome: Progressing  Goal: Maintains/Returns to pre admission functional level  Description: INTERVENTIONS:  - Perform AM-PAC 6 Click Basic Mobility/ Daily Activity assessment daily.  - Set and communicate daily mobility goal to care team and patient/family/caregiver.   - Collaborate with rehabilitation services on mobility goals if consulted  - Perform Range of Motion  times a day.  - Reposition patient every  hours.  - Dangle patient  times a day  - Stand patient  times a day  - Ambulate patient  times a day  - Out of bed to chair  times a day   - Out of bed for meals  times a day  - Out of bed for toileting  - Record patient progress and toleration of activity level   Outcome: Progressing     Problem: DISCHARGE PLANNING  Goal: Discharge to home or other facility with appropriate resources  Description: INTERVENTIONS:  - Identify barriers to discharge w/patient and caregiver  - Arrange for  needed discharge resources and transportation as appropriate  - Identify discharge learning needs (meds, wound care, etc.)  - Arrange for interpretive services to assist at discharge as needed  - Refer to Case Management Department for coordinating discharge planning if the patient needs post-hospital services based on physician/advanced practitioner order or complex needs related to functional status, cognitive ability, or social support system  Outcome: Progressing     Problem: Knowledge Deficit  Goal: Patient/family/caregiver demonstrates understanding of disease process, treatment plan, medications, and discharge instructions  Description: Complete learning assessment and assess knowledge base.  Interventions:  - Provide teaching at level of understanding  - Provide teaching via preferred learning methods  Outcome: Progressing     Problem: Prexisting or High Potential for Compromised Skin Integrity  Goal: Skin integrity is maintained or improved  Description: INTERVENTIONS:  - Identify patients at risk for skin breakdown  - Assess and monitor skin integrity  - Assess and monitor nutrition and hydration status  - Monitor labs   - Assess for incontinence   - Turn and reposition patient  - Assist with mobility/ambulation  - Relieve pressure over bony prominences  - Avoid friction and shearing  - Provide appropriate hygiene as needed including keeping skin clean and dry  - Evaluate need for skin moisturizer/barrier cream  - Collaborate with interdisciplinary team   - Patient/family teaching  - Consider wound care consult   Outcome: Progressing     Problem: Nutrition/Hydration-ADULT  Goal: Nutrient/Hydration intake appropriate for improving, restoring or maintaining nutritional needs  Description: Monitor and assess patient's nutrition/hydration status for malnutrition. Collaborate with interdisciplinary team and initiate plan and interventions as ordered.  Monitor patient's weight and dietary intake as ordered or  per policy. Utilize nutrition screening tool and intervene as necessary. Determine patient's food preferences and provide high-protein, high-caloric foods as appropriate.     INTERVENTIONS:  - Monitor oral intake, urinary output, labs, and treatment plans  - Assess nutrition and hydration status and recommend course of action  - Evaluate amount of meals eaten  - Assist patient with eating if necessary   - Allow adequate time for meals  - Recommend/ encourage appropriate diets, oral nutritional supplements, and vitamin/mineral supplements  - Order, calculate, and assess calorie counts as needed  - Recommend, monitor, and adjust tube feedings and TPN/PPN based on assessed needs  - Assess need for intravenous fluids  - Provide specific nutrition/hydration education as appropriate  - Include patient/family/caregiver in decisions related to nutrition  Outcome: Progressing

## 2025-01-23 NOTE — WOUND OSTOMY CARE
Progress Note - Wound   Royce BAER Free 79 y.o. male MRN: [id number]  Unit/Bed#: [facility] 523-01 Encounter: [id number]        Assessment:   Patient is seen for wound care follow-up. Seen lying on regular mattress. He declined assessment of sacro-buttocks at time of exam- he reports no wounds or skin loss to area. Agreeable for assessment of left face wound. Left foot wound is managed per podiatry recommendations.     Findings:  Left Face wound: remains stable in size with a beefy red hypergranular wound bed. Selene-wound is fragile. Scant to small sanguineous drainage noted. New foam dressing placed.           No induration, fluctuance, odor, warmth/temperature differences, redness, or purulence noted to the above noted wounds and skin areas assessed. New dressings applied per orders listed below. Patient tolerated well- no s/s of non-verbal pain or discomfort observed during the encounter. Bedside nurse aware of plan of care. See flow sheets for more detailed assessment findings.      Orders listed below and wound care will continue to follow, call or Secure Chat Message with questions.           Skin Care Plan:  1-Preventative Hydraguard to B/L Sacro-Buttocks BID and PRN  2-Turn/reposition q2h or when medically stable for pressure re-distribution on skin.  3-Elevate left heel to offload pressure.  4-Moisturize skin daily with skin nourishing cream  5-Ehob cushion in chair when out of bed.  6-Left Foot/Toe  Wounds: per Podiatry recommendations.  7-Left Face Wound: Cleanse with NSS and pat dry. Apply a small 2x2 silicone bordered foam dressing to wound. Zachery with T for treatment and change every other day or PRN        WOUNDS:  Wound 10/24/24 Other (comment) Face Left (Active)   Wound Image   01/23/25 1600   Wound Description Beefy red;Bleeding;Hypergranulation 01/23/25 1600   Selene-wound Assessment Fragile 01/23/25 1600   Wound Length (cm) 1.5 cm 01/17/25 0920   Wound Width (cm) 1.5 cm 01/17/25 0920   Wound Depth (cm) 0.1 cm  01/17/25 0920   Wound Surface Area (cm^2) 2.25 cm^2 01/17/25 0920   Wound Volume (cm^3) 0.225 cm^3 01/17/25 0920   Calculated Wound Volume (cm^3) 0.23 cm^3 01/17/25 0920   Change in Wound Size % -130 01/17/25 0920   Wound Site Closure EARL 01/23/25 1600   Drainage Amount Small 01/23/25 1600   Drainage Description Sanguineous 01/23/25 1600   Non-staged Wound Description Partial thickness 01/23/25 1600   Treatments Cleansed;Irrigation with NSS;Site care 01/23/25 1600   Dressing Foam, Silicon (eg. Allevyn, etc) 01/23/25 1600   Dressing Changed Changed 01/23/25 1600   Patient Tolerance Tolerated well 01/23/25 1600   Dressing Status Dry;Intact;Clean 01/23/25 1600                    Odalis Chávez RN, BSN, CWOCN

## 2025-01-23 NOTE — ASSESSMENT & PLAN NOTE
Patient was recently admitted to [facility] for failure to thrive and generalized weakness following chemotherapy.  Patient was pending placement to rehab facility when he was noted to have gangrene on the right foot and poor healing wound on the left.  Case was discussed with vascular surgery who recommended transfer to [place].    Patient was seen by both podiatry and vascular surgery, CT of the left lower extremity showed severe disease.  No revascularization options available, recommended right AKA which was done 1/8.  Local wound care of right stump per vascular surgery  Patient has stable wounds on the left LE without clinical signs of infection.  Continue local wound care per podiatry on the left  Weightbearing as tolerated  Continue aspirin, Plavix, statin, Trental.   Acute pain has been following, signing off 1/14.  Continue Tylenol, Robaxin, gabapentin as needed oxycodone.   PT/OT recommending rehab, case management following.   Awaiting placement

## 2025-01-23 NOTE — ASSESSMENT & PLAN NOTE
Malnutrition Findings:   Adult Malnutrition type: Acute illness  Adult Degree of Malnutrition: Other severe protein calorie malnutrition  Malnutrition Characteristics: Inadequate energy, Weight loss                  360 Statement: related to catabolic illness, inadequate energy intake due to nausea, diarrhea, as evidenced by, 10% weight loss last months, intake < 50% estimated energy requirements for > 5 days, dark sunken orbital. Treatment: Regular diet, oral nutrition supplements.    BMI Findings:           Body mass index is 29.63 kg/m².   Continue with regular diet and nutrition supplement

## 2025-01-23 NOTE — ASSESSMENT & PLAN NOTE
Patient with diarrhea since receiving last round of chemotherapy in December 2024.  Also with intermittent nausea/vomiting and overall poor oral intake.    Negative stool studies  GI planning outpatient follow-up to discuss colonoscopy and evaluation of elevated fecal calprotectin  Loperamide PRN, lomotil  Resolved

## 2025-01-23 NOTE — ASSESSMENT & PLAN NOTE
Wt Readings from Last 3 Encounters:   01/23/25 88.4 kg (194 lb 14.2 oz)   12/26/24 100 kg (220 lb 14.4 oz)   12/13/24 103 kg (227 lb)   Cardiac echo on 8/2/2023 with EF 30 - 35%  Continue metoprolol, Jardiance, lasix, losartan  Daily weights, I/Os

## 2025-01-23 NOTE — OCCUPATIONAL THERAPY NOTE
Occupational Therapy Progress Note     Patient Name: Royce Rene  Today's Date: 1/23/2025  Problem List  Principal Problem:    PAD (peripheral artery disease) (HCC)  Active Problems:    Chronic combined systolic and diastolic CHF (congestive heart failure) (HCC)    Ischemic cardiomyopathy    Prostate cancer (HCC)    CAD (coronary artery disease)    Urinary retention    Ambulatory dysfunction    Ulcer of left foot, limited to breakdown of skin (HCC)    Mixed hyperlipidemia    Diarrhea    Iron deficiency anemia    History of transcatheter aortic valve replacement (TAVR)    Encounter for preoperative assessment for noncoronary cardiac surgery    Severe protein-calorie malnutrition (HCC)    Nausea    S/P AKA (above knee amputation) unilateral, right (HCC)    Impaired mobility and activities of daily living        01/23/25 1331   OT Last Visit   OT Visit Date 01/23/25   Note Type   Note Type Treatment   Pain Assessment   Pain Assessment Tool 0-10   Pain Score 8   Pain Location/Orientation Location: Leg;Orientation: Right   Patient's Stated Pain Goal No pain   Hospital Pain Intervention(s) Repositioned;Ambulation/increased activity;Emotional support   Restrictions/Precautions   Weight Bearing Precautions Per Order Yes   RLE Weight Bearing Per Order NWB  (AKA)   LLE Weight Bearing Per Order WBAT   Braces or Orthoses Other (Comment)  (sx shoe)   Other Precautions Fall Risk;Chair Alarm;Bed Alarm;Cognitive;WBS  (R AKA)   Lifestyle   Autonomy PTA, pt reports being I with ADLS, IADLS, SPT into electric scooter, (-)    Reciprocal Relationships Supportive son who lives in the same [facility] complex. Pt reports son does work M-W during the day   Service to Others Retired   Intrinsic Gratification Enjoys watching sports - [employer] fan   ADL   Where Assessed Chair   Grooming Assistance 5  Supervision/Setup   Grooming Deficit Setup;Verbal cueing;Supervision/safety;Increased time to complete;Brushing hair   Grooming Comments Pt  "required set up and one-step VC to brush hair seated at EOB   UB Dressing Assistance 4  Minimal Assistance   UB Dressing Deficit Setup;Verbal cueing;Increased time to complete;Thread RUE;Thread LUE;Pull around back   UB Dressing Comments Pt requires Min A to thread BUE into hospital gown supine in bed   LB Dressing Assistance 2  Maximal Assistance   LB Dressing Deficit Don/doff L sock   LB Dressing Comments Pt requires max A to don L sock seated at EOB   Toileting Assistance  2  Maximal Assistance   Toileting Deficit Verbal cueing;Increased time to complete;Clothing management up;Clothing management down;Perineal hygiene   Toileting Comments Pt becomes incontinent of bowel during session. Total A for perineal hygiene and clothing management supine in bed. Clean pad placed. Rolls R/L in bed w/ supervision.   Functional Standing Tolerance   Time 30 seconds x2 t/o session   Comments Pt tolerates standing up to 30 seconds w/ RW for support. Poor static standing balance requiring b/l HHA.   Bed Mobility   Rolling R 5  Supervision   Additional items Increased time required;Verbal cues;Bedrails   Rolling L 5  Supervision   Additional items Increased time required;Bedrails;Verbal cues   Supine to Sit 3  Moderate assistance   Additional items Assist x 1;Increased time required;Verbal cues;LE management;HOB elevated;Bedrails   Sit to Supine 3  Moderate assistance   Additional items Assist x 1;HOB elevated;Bedrails;Increased time required;Verbal cues;LE management   Additional Comments Pt greeted and left supine in bed w/ alarm on and all needs within reach   Transfers   Sit to Stand 3  Moderate assistance   Additional items Assist x 2;Increased time required;Verbal cues   Stand to Sit 3  Moderate assistance   Additional items Assist x 2;Increased time required;Verbal cues   Additional Comments x3 STS w/ RW. Declines OOB to chair at this time.   Subjective   Subjective \"yeah let's try one more time\"   Cognition   Overall " Cognitive Status Impaired   Arousal/Participation Alert;Responsive;Cooperative   Attention Attends with cues to redirect   Orientation Level Oriented to person;Oriented to place;Oriented to situation;Disoriented to time   Memory Decreased short term memory;Decreased recall of recent events;Decreased recall of precautions   Following Commands Follows one step commands with increased time or repetition   Comments Pt is pleasant and cooperative. Frequent VC for proper technique w/ transfers. Forgetful at times.   Activity Tolerance   Activity Tolerance Patient limited by fatigue;Patient limited by pain;Patient tolerated treatment well   Medical Staff Made Aware co-tx w/ PT due to medical complexity and multiple comorbidities   Assessment   Assessment Pt seen for skilled OT treatment session on this date w/ interventions focusing on ADL participation, bed mobility , and transfer skills. Pt was agreeable and willing to participate in session. Pt engaged in the following tasks: Supervision grooming, rolling; Min A UB dressing; Mod A bed mobility and transfers; Max A LB dressing, toileting. In comparison to previous session, pt demonstrated improvements in activity tolerance and standing tolerance. Pt required frequent re direction, verbal cues for safety, verbal cues for correct technique, one step directives, and frequent rest periods. Pt continues to be functioning below baseline level as occupational performance remains limited by decreased ADL status, decreased activity tolerance, decreased endurance, decreased sitting tolerance, decreased sitting balance, decreased standing tolerance, decreased standing balance, decreased transfer skills, decreased fxnl mobility, decreased safety awareness, decreased insight into deficits, pain, generalized weakness , and AKA . The patient's raw score on the -PAC Daily Activity Inpatient Short Form is 16. A raw score of less than 19 suggests the patient may benefit from discharge  to post-acute rehabilitation services. Please refer to the recommendation of the Occupational Therapist for safe discharge planning.    From OT standpoint, recommend Level I (Maximum Resource Intensity) at time of d/c. Pt will benefit from continued OT treatment while in acute care to address deficits as defined above and maximize level of functional independence with ADLs and functional mobility. Pt left supine in bed w/ alarm on and all needs within reach at end of session.   Plan   Treatment Interventions ADL retraining;Functional transfer training;Endurance training;Patient/family training;Cognitive reorientation;Equipment evaluation/education;Compensatory technique education;Continued evaluation;Energy conservation;Activityengagement   Goal Expiration Date 02/03/25   OT Treatment Day 5   OT Frequency 2-3x/wk   Discharge Recommendation   Rehab Resource Intensity Level, OT I (Maximum Resource Intensity)   AM-PAC Daily Activity Inpatient   Lower Body Dressing 2   Bathing 2   Toileting 2   Upper Body Dressing 3   Grooming 3   Eating 4   Daily Activity Raw Score 16   Daily Activity Standardized Score (Calc for Raw Score >=11) 35.96   AM-PAC Applied Cognition Inpatient   Following a Speech/Presentation 3   Understanding Ordinary Conversation 3   Taking Medications 2   Remembering Where Things Are Placed or Put Away 2   Remembering List of 4-5 Errands 2   Taking Care of Complicated Tasks 2   Applied Cognition Raw Score 14   Applied Cognition Standardized Score 32.02   End of Consult   Education Provided Yes   Patient Position at End of Consult Supine;Bed/Chair alarm activated;All needs within reach   Nurse Communication Nurse aware of consult     BELKIS Paul, OTR/L

## 2025-01-23 NOTE — CASE MANAGEMENT
Case Management Progress Note    Patient name Royce BAER Free  Location [facility]-01 MRN [id number]  : 1945 Date 2025       LOS (days): 22  Geometric Mean LOS (GMLOS) (days):   Days to GMLOS:        Pt's LOS is 22 days. Pt remains medically stable and ready for d/c. Pt is S/P Right AKA and recommended for acute rehab placement for his aftercare plan. Pt was referred to and accepted for placement at [facility] for his aftercare plan.    Pt's [employer] Medicare-replacement insurance plan denied acute rehab placement for the pt on 25. A [id number] appeal was performed on 25 and failed to overturn the denial. Pt requested to proceed with an Expedited Appeal, and the determination came back this day that the appeal failed and the denial for acute rehab placement has been upheld. There are no further appeals.    Pt is agreeable to proceed with SNF rehab placement for his aftercare plan. CM had previously made soft AIDIN referrals to several [facility] SNFs that are in network w/ pt's insurance. CM reviewed list of facilities w/ pt who are able to offer rehab placement. Pt was agreeable to accept a bed offer at [facility].    CM submitted for authorization from pt's [facility] Medicare-replacement insurance for SNF rehab placement at [facility].    CM to follow.

## 2025-01-24 VITALS
TEMPERATURE: 98.1 F | OXYGEN SATURATION: 95 % | HEART RATE: 81 BPM | BODY MASS INDEX: 28.96 KG/M2 | SYSTOLIC BLOOD PRESSURE: 120 MMHG | WEIGHT: 190.48 LBS | RESPIRATION RATE: 18 BRPM | DIASTOLIC BLOOD PRESSURE: 79 MMHG

## 2025-01-24 PROBLEM — R11.0 NAUSEA: Status: RESOLVED | Noted: 2025-01-15 | Resolved: 2025-01-24

## 2025-01-24 PROBLEM — R19.7 DIARRHEA: Status: RESOLVED | Noted: 2024-12-26 | Resolved: 2025-01-24

## 2025-01-24 PROBLEM — R33.9 URINARY RETENTION: Status: RESOLVED | Noted: 2021-07-11 | Resolved: 2025-01-24

## 2025-01-24 PROCEDURE — 99239 HOSP IP/OBS DSCHRG MGMT >30: CPT | Performed by: INTERNAL MEDICINE

## 2025-01-24 RX ORDER — ACETAMINOPHEN 325 MG/1
975 TABLET ORAL EVERY 8 HOURS SCHEDULED
Start: 2025-01-24

## 2025-01-24 RX ORDER — OXYCODONE HYDROCHLORIDE 5 MG/1
5 TABLET ORAL EVERY 4 HOURS PRN
Qty: 15 TABLET
Start: 2025-01-24

## 2025-01-24 RX ORDER — OXYCODONE HYDROCHLORIDE 10 MG/1
10 TABLET ORAL EVERY 4 HOURS PRN
Start: 2025-01-24 | End: 2025-02-03

## 2025-01-24 RX ORDER — GUAIFENESIN 600 MG/1
600 TABLET, EXTENDED RELEASE ORAL EVERY 12 HOURS SCHEDULED
Start: 2025-01-24

## 2025-01-24 RX ORDER — VENLAFAXINE 25 MG/1
25 TABLET ORAL DAILY
Start: 2025-01-25

## 2025-01-24 RX ORDER — SUCRALFATE 1 G/1
1 TABLET ORAL
Start: 2025-01-24

## 2025-01-24 RX ORDER — PANTOPRAZOLE SODIUM 40 MG/1
40 TABLET, DELAYED RELEASE ORAL
Start: 2025-01-24

## 2025-01-24 RX ADMIN — SUCRALFATE 1 G: 1 TABLET ORAL at 10:03

## 2025-01-24 RX ADMIN — METOPROLOL SUCCINATE 25 MG: 25 TABLET, EXTENDED RELEASE ORAL at 10:08

## 2025-01-24 RX ADMIN — EMPAGLIFLOZIN 10 MG: 10 TABLET, FILM COATED ORAL at 10:09

## 2025-01-24 RX ADMIN — FAMOTIDINE 20 MG: 20 TABLET, FILM COATED ORAL at 10:06

## 2025-01-24 RX ADMIN — ENOXAPARIN SODIUM 40 MG: 40 INJECTION SUBCUTANEOUS at 10:03

## 2025-01-24 RX ADMIN — PREDNISONE 5 MG: 5 TABLET ORAL at 10:07

## 2025-01-24 RX ADMIN — OXYCODONE HYDROCHLORIDE 10 MG: 10 TABLET ORAL at 05:46

## 2025-01-24 RX ADMIN — ASPIRIN 81 MG CHEWABLE TABLET 81 MG: 81 TABLET CHEWABLE at 10:07

## 2025-01-24 RX ADMIN — LOSARTAN POTASSIUM 12.5 MG: 25 TABLET, FILM COATED ORAL at 10:03

## 2025-01-24 RX ADMIN — FINASTERIDE 5 MG: 5 TABLET, FILM COATED ORAL at 10:07

## 2025-01-24 RX ADMIN — ACETAMINOPHEN 975 MG: 325 TABLET, FILM COATED ORAL at 05:46

## 2025-01-24 RX ADMIN — GUAIFENESIN 600 MG: 600 TABLET, EXTENDED RELEASE ORAL at 10:03

## 2025-01-24 RX ADMIN — FERROUS SULFATE TAB 325 MG (65 MG ELEMENTAL FE) 325 MG: 325 (65 FE) TAB at 10:08

## 2025-01-24 RX ADMIN — PENTOXIFYLLINE 400 MG: 400 TABLET, EXTENDED RELEASE ORAL at 13:19

## 2025-01-24 RX ADMIN — EZETIMIBE 10 MG: 10 TABLET ORAL at 10:03

## 2025-01-24 RX ADMIN — GABAPENTIN 600 MG: 300 CAPSULE ORAL at 10:07

## 2025-01-24 RX ADMIN — POTASSIUM CHLORIDE 40 MEQ: 1500 TABLET, EXTENDED RELEASE ORAL at 10:08

## 2025-01-24 RX ADMIN — PANTOPRAZOLE SODIUM 40 MG: 40 TABLET, DELAYED RELEASE ORAL at 05:46

## 2025-01-24 RX ADMIN — ACETAMINOPHEN 975 MG: 325 TABLET, FILM COATED ORAL at 13:19

## 2025-01-24 RX ADMIN — ALLOPURINOL 100 MG: 100 TABLET ORAL at 10:03

## 2025-01-24 RX ADMIN — FUROSEMIDE 40 MG: 40 TABLET ORAL at 10:08

## 2025-01-24 RX ADMIN — VENLAFAXINE 25 MG: 25 TABLET ORAL at 10:09

## 2025-01-24 RX ADMIN — CLOPIDOGREL BISULFATE 75 MG: 75 TABLET, FILM COATED ORAL at 10:07

## 2025-01-24 NOTE — PLAN OF CARE
Problem: PAIN - ADULT  Goal: Verbalizes/displays adequate comfort level or baseline comfort level  Description: Interventions:  - Encourage patient to monitor pain and request assistance  - Assess pain using appropriate pain scale  - Administer analgesics based on type and severity of pain and evaluate response  - Implement non-pharmacological measures as appropriate and evaluate response  - Consider cultural and social influences on pain and pain management  - Notify physician/advanced practitioner if interventions unsuccessful or patient reports new pain  Outcome: Progressing     Problem: INFECTION - ADULT  Goal: Absence or prevention of progression during hospitalization  Description: INTERVENTIONS:  - Assess and monitor for signs and symptoms of infection  - Monitor lab/diagnostic results  - Monitor all insertion sites, i.e. indwelling lines, tubes, and drains  - Monitor endotracheal if appropriate and nasal secretions for changes in amount and color  - [place] appropriate cooling/warming therapies per order  - Administer medications as ordered  - Instruct and encourage patient and family to use good hand hygiene technique  - Identify and instruct in appropriate isolation precautions for identified infection/condition  Outcome: Progressing  Goal: Absence of fever/infection during neutropenic period  Description: INTERVENTIONS:  - Monitor WBC    Outcome: Progressing     Problem: SAFETY ADULT  Goal: Patient will remain free of falls  Description: INTERVENTIONS:  - Educate patient/family on patient safety including physical limitations  - Instruct patient to call for assistance with activity   - Consult OT/PT to assist with strengthening/mobility   - Keep Call bell within reach  - Keep bed low and locked with side rails adjusted as appropriate  - Keep care items and personal belongings within reach  - Initiate and maintain comfort rounds  - Make Fall Risk Sign visible to staff  - Offer Toileting every  Hours,  in advance of need  - Initiate/Maintain alarm  - Obtain necessary fall risk management equipment:   - Apply yellow socks and bracelet for high fall risk patients  - Consider moving patient to room near nurses station  Outcome: Progressing  Goal: Maintain or return to baseline ADL function  Description: INTERVENTIONS:  -  Assess patient's ability to carry out ADLs; assess patient's baseline for ADL function and identify physical deficits which impact ability to perform ADLs (bathing, care of mouth/teeth, toileting, grooming, dressing, etc.)  - Assess/evaluate cause of self-care deficits   - Assess range of motion  - Assess patient's mobility; develop plan if impaired  - Assess patient's need for assistive devices and provide as appropriate  - Encourage maximum independence but intervene and supervise when necessary  - Involve family in performance of ADLs  - Assess for home care needs following discharge   - Consider OT consult to assist with ADL evaluation and planning for discharge  - Provide patient education as appropriate  Outcome: Progressing  Goal: Maintains/Returns to pre admission functional level  Description: INTERVENTIONS:  - Perform AM-PAC 6 Click Basic Mobility/ Daily Activity assessment daily.  - Set and communicate daily mobility goal to care team and patient/family/caregiver.   - Collaborate with rehabilitation services on mobility goals if consulted  - Perform Range of Motion  times a day.  - Reposition patient every  hours.  - Dangle patient  times a day  - Stand patient  times a day  - Ambulate patient  times a day  - Out of bed to chair  times a day   - Out of bed for meals times a day  - Out of bed for toileting  - Record patient progress and toleration of activity level   Outcome: Progressing     Problem: DISCHARGE PLANNING  Goal: Discharge to home or other facility with appropriate resources  Description: INTERVENTIONS:  - Identify barriers to discharge w/patient and caregiver  - Arrange for  needed discharge resources and transportation as appropriate  - Identify discharge learning needs (meds, wound care, etc.)  - Arrange for interpretive services to assist at discharge as needed  - Refer to Case Management Department for coordinating discharge planning if the patient needs post-hospital services based on physician/advanced practitioner order or complex needs related to functional status, cognitive ability, or social support system  Outcome: Progressing     Problem: Knowledge Deficit  Goal: Patient/family/caregiver demonstrates understanding of disease process, treatment plan, medications, and discharge instructions  Description: Complete learning assessment and assess knowledge base.  Interventions:  - Provide teaching at level of understanding  - Provide teaching via preferred learning methods  Outcome: Progressing     Problem: Prexisting or High Potential for Compromised Skin Integrity  Goal: Skin integrity is maintained or improved  Description: INTERVENTIONS:  - Identify patients at risk for skin breakdown  - Assess and monitor skin integrity  - Assess and monitor nutrition and hydration status  - Monitor labs   - Assess for incontinence   - Turn and reposition patient  - Assist with mobility/ambulation  - Relieve pressure over bony prominences  - Avoid friction and shearing  - Provide appropriate hygiene as needed including keeping skin clean and dry  - Evaluate need for skin moisturizer/barrier cream  - Collaborate with interdisciplinary team   - Patient/family teaching  - Consider wound care consult   Outcome: Progressing     Problem: Nutrition/Hydration-ADULT  Goal: Nutrient/Hydration intake appropriate for improving, restoring or maintaining nutritional needs  Description: Monitor and assess patient's nutrition/hydration status for malnutrition. Collaborate with interdisciplinary team and initiate plan and interventions as ordered.  Monitor patient's weight and dietary intake as ordered or  per policy. Utilize nutrition screening tool and intervene as necessary. Determine patient's food preferences and provide high-protein, high-caloric foods as appropriate.     INTERVENTIONS:  - Monitor oral intake, urinary output, labs, and treatment plans  - Assess nutrition and hydration status and recommend course of action  - Evaluate amount of meals eaten  - Assist patient with eating if necessary   - Allow adequate time for meals  - Recommend/ encourage appropriate diets, oral nutritional supplements, and vitamin/mineral supplements  - Order, calculate, and assess calorie counts as needed  - Recommend, monitor, and adjust tube feedings and TPN/PPN based on assessed needs  - Assess need for intravenous fluids  - Provide specific nutrition/hydration education as appropriate  - Include patient/family/caregiver in decisions related to nutrition  Outcome: Progressing

## 2025-01-24 NOTE — ASSESSMENT & PLAN NOTE
Patient was recently admitted to [facility] for failure to thrive and generalized weakness following chemotherapy.  Patient was pending placement to rehab facility when he was noted to have gangrene on the right foot and poor healing wound on the left.  Case was discussed with vascular surgery who recommended transfer to [place].    Patient was seen by both podiatry and vascular surgery, CT of the left lower extremity showed severe disease.  No revascularization options available, recommended right AKA which was done 1/8.  Local wound care of right stump per vascular surgery  Patient has stable wounds on the left LE without clinical signs of infection.  Continue local wound care per podiatry on the left  Weightbearing as tolerated  Continue aspirin, Plavix, statin, Trental.   Acute pain has been following, signing off 1/14.  Continue Tylenol, Robaxin, gabapentin as needed oxycodone.   PT/OT recommending rehab, case management following.   Plan for rehab today

## 2025-01-24 NOTE — CASE MANAGEMENT
Case Management Discharge Note    Patient name Royce Rene  Location [facility] MRN [id number]  : 1945 Date 2025       Current Admission Date: 2025  Current Admission Diagnosis:PAD (peripheral artery disease) (HCC)      LOS (days): 23  Geometric Mean LOS (GMLOS) (days):   Days to GMLOS:     OBJECTIVE:  Risk of Unplanned Readmission Score: 44.09   Current admission status: Inpatient   Primary Insurance: [facility]  Secondary Insurance: [employer] [facility]    DISCHARGE DETAILS:  Discharge planning discussed with:: Patient  Freedom of Choice: Yes  CM contacted family/caregiver?: Yes  Were Treatment Team discharge recommendations reviewed with patient/caregiver?: Yes  Did patient/caregiver verbalize understanding of patient care needs?: Yes  Were patient/caregiver advised of the risks associated with not following Treatment Team discharge recommendations?: Yes    Contacts  Patient Contacts: Rodrigue Rene (pt's son)  Relationship to Patient:: Family  Contact Method: Phone  Phone Number: [phone] (mobile)  Reason/Outcome: Emergency Contact    Treatment Team Recommendation: Acute Rehab  Discharge Destination Plan:: Short Term Rehab  Transport at Discharge : hospitals [facility]ulance  Number/Name of Dispatcher: [phone]  Transported by (Company and Unit #): [facility]  ETA of Transport (Date): 25  ETA of Transport (Time): 1530 (3:30PM)    Additional Comments: Pt is cleared for d/c by DALE Harper. RN Polina Campoverde was notified of pt's d/c order. Pt is accepted for short-term skilled rehab placement at [facility]. CORKY obtained Pending Ref # [id number] for SNF placement from pt's [facility] Medicare-replacement insurance plan. [facility] liaison Darshana Shrestha (phone# [phone]) reported pt can be admitted to SNF w/ a Pending Ref # instead of an Auth#. CM arranged hospitals ambulance via [facility] for 3:30PM pickup this day to transport pt to SNF. The pt and his son  Rodrigue Rene were both informed of d/c. IMM was signed by pt on 1/23/25. PCS for transport was completed by CM. Chart copy for SNF was requested from RN. No further CM needs.    Accepting Facility Name, City & State : [facility]  Receiving Facility/Agency Phone Number: [phone]  Facility/Agency Fax Number: [phone]  Facility Insurance Pending Reference Number: [id number]

## 2025-01-24 NOTE — ASSESSMENT & PLAN NOTE
Malnutrition Findings:   Adult Malnutrition type: Acute illness  Adult Degree of Malnutrition: Other severe protein calorie malnutrition  Malnutrition Characteristics: Inadequate energy, Weight loss                  360 Statement: related to catabolic illness, inadequate energy intake due to nausea, diarrhea, as evidenced by, 10% weight loss last months, intake < 50% estimated energy requirements for > 5 days, dark sunken orbital. Treatment: Regular diet, oral nutrition supplements.    BMI Findings:           Body mass index is 28.96 kg/m².   Continue with regular diet and nutrition supplement

## 2025-01-24 NOTE — CASE MANAGEMENT
Case Management Discharge Planning Note    Patient name Royce Rene  Location [facility]-01 MRN [id number]  : 1945 Date 2025       Current Admission Date: 2025  Current Admission Diagnosis:PAD (peripheral artery disease) (HCC)   Patient Active Problem List    Diagnosis Date Noted Date Diagnosed    S/P AKA (above knee amputation) unilateral, right (HCC) 2025     Impaired mobility and activities of daily living 2025     Severe protein-calorie malnutrition (HCC) 2025     History of transcatheter aortic valve replacement (TAVR) 2025     Encounter for preoperative assessment for noncoronary cardiac surgery 2025     Hyperuricemia 2024     Iron deficiency anemia 2024     Diabetic ulcer of toe of right foot associated with type 2 diabetes mellitus, with necrosis of bone (HCC) 2024     Vitamin B12 deficiency 2024     Mixed hyperlipidemia 2024     Exudative age-related macular degeneration, left eye, with active choroidal neovascularization (HCC) 2024     Facial lesion 2024     Dry gangrene (HCC) 2024     Ischemic pain of foot at rest 10/29/2024     Ulcer of right foot with fat layer exposed (HCC) 10/29/2024     Ulcer of left foot, limited to breakdown of skin (HCC) 10/29/2024     Osteomyelitis (HCC) 10/22/2024     Prevention of chemotherapy-induced neutropenia 2024     Frail elder // vulnerable adult 2024     Elevated liver enzymes 2024     Proctitis 2024     Class 1 obesity due to excess calories with body mass index (BMI) of 33.0 to 33.9 in adult 2023     Electrolyte imbalance 2023     Moderate vascular dementia (HCC) 2023     Ambulatory dysfunction 2022     Peripheral neuropathy 2022     Financial problems 2022     Cancer-related pain 05/10/2022     Palliative care patient 05/10/2022     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) 2022     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[facility]) 02/28/2022     Depression, recurrent ([facility]) 02/28/2022     Port-A-Cath in place 10/05/2021     PAD (peripheral artery disease) ([facility]) 07/19/2021     CAD (coronary artery disease) 07/10/2021     Ischemic leg pain 07/07/2021     Prostate cancer ([facility]) 07/07/2021     Ischemic cardiomyopathy 07/01/2021     Chronic combined systolic and diastolic CHF (congestive heart failure) ([facility]) 06/22/2021     S/P AVR 06/22/2021     Anemia 06/22/2021       LOS (days): 23  Geometric Mean LOS (GMLOS) (days):   Days to GMLOS:     OBJECTIVE:  Risk of Unplanned Readmission Score: 44.09         Current admission status: Inpatient   [facility]   [facility] [place] [facility] [address]  [address] [facility] [address]  Phone: [phone] Fax: [phone]    Primary Care Provider: Anne Chandra MD    Primary Insurance: [facility] [employer]  Secondary Insurance: [facility] REP    DISCHARGE DETAILS:                                                                                                               Facility Insurance Auth Number: [id number]

## 2025-01-24 NOTE — ASSESSMENT & PLAN NOTE
Pic under media.  Low suspicion for active infection  Per podiatry, no surgical plans  Status post angiogram with the intervention previously  Continue with local wound care  Outpatient podiatry follow-up

## 2025-01-24 NOTE — NURSING NOTE
Pt cleared for discharge to [facility] at this time. IV site and Masimo removed. Belongings gathered. D/C instructions faxed to facility and sent with transport. Attempted to call report, but no answer received.

## 2025-01-24 NOTE — DISCHARGE SUMMARY
Discharge Summary - Hospitalist   Name: Royce Rene 79 y.o. male I MRN: [id number]  Unit/Bed#: [facility]-01 I Date of Admission: 1/1/2025   Date of Service: 1/24/2025 I Hospital Day: 23     Assessment & Plan  PAD (peripheral artery disease) (HCC)  Patient was recently admitted to [facility] for failure to thrive and generalized weakness following chemotherapy.  Patient was pending placement to rehab facility when he was noted to have gangrene on the right foot and poor healing wound on the left.  Case was discussed with vascular surgery who recommended transfer to [place].    Patient was seen by both podiatry and vascular surgery, CT of the left lower extremity showed severe disease.  No revascularization options available, recommended right AKA which was done 1/8.  Local wound care of right stump per vascular surgery  Patient has stable wounds on the left LE without clinical signs of infection.  Continue local wound care per podiatry on the left  Weightbearing as tolerated  Continue aspirin, Plavix, statin, Trental.   Acute pain has been following, signing off 1/14.  Continue Tylenol, Robaxin, gabapentin as needed oxycodone.   PT/OT recommending rehab, case management following.   Plan for rehab today  Ulcer of left foot, limited to breakdown of skin (HCC)  Pic under media.  Low suspicion for active infection  Per podiatry, no surgical plans  Status post angiogram with the intervention previously  Continue with local wound care  Outpatient podiatry follow-up    Chronic combined systolic and diastolic CHF (congestive heart failure) ([facility])  Wt Readings from Last 3 Encounters:   01/24/25 86.4 kg (190 lb 7.6 oz)   12/26/24 100 kg (220 lb 14.4 oz)   12/13/24 103 kg (227 lb)   Cardiac echo on 8/2/2023 with EF 30 - 35%  Continue metoprolol, Jardiance, lasix, losartan  Daily weights, I/Os  CAD (coronary artery disease)  Continue aspirin, Plavix, beta-blocker, statin  Stable  Ambulatory dysfunction  Baseline- close to  Jermaine was added to my Medicare bundle list today. He is 37% admission risk and was DC from [facility]osp on 5[facility]24[facility]21. 30 Day TCM nurses has already attempted two calls to reach him so I will attempt again next week     wheelchair bounded, walks minimally  Status post AKA  Plan for rehab today  Prostate cancer (HCC)  History of prostate cancer follows with oncology receiving chemotherapy (last on 12/12)  Follows with Dr. Hernandez  Current regimen is Cabazitaxel, Neulasta, prednisone, Lupron, denosumab  Continue to follow in outpatient setting    Iron deficiency anemia    Results from last 7 days   Lab Units 01/23/25  0451 01/19/25  0446 01/18/25  0540   HEMOGLOBIN g/dL 10.3* 10.0* 10.0*   MCV fL 92 91 92   RDW % 16.4* 15.9* 15.9*     S/p 1 unit PRBC transfusion 1/12  Continue with iron supplement  Monitor  Mixed hyperlipidemia  Continue statin  History of transcatheter aortic valve replacement (TAVR)  Noted  Severe protein-calorie malnutrition (HCC)  Malnutrition Findings:   Adult Malnutrition type: Acute illness  Adult Degree of Malnutrition: Other severe protein calorie malnutrition  Malnutrition Characteristics: Inadequate energy, Weight loss                  360 Statement: related to catabolic illness, inadequate energy intake due to nausea, diarrhea, as evidenced by, 10% weight loss last months, intake < 50% estimated energy requirements for > 5 days, dark sunken orbital. Treatment: Regular diet, oral nutrition supplements.    BMI Findings:           Body mass index is 28.96 kg/m².   Continue with regular diet and nutrition supplement     Medical Problems       Resolved Problems  Date Reviewed: 1/1/2025          Resolved    Urinary retention 1/24/2025     Resolved by  Estevan Harper DO    Hyponatremia 1/14/2025     Resolved by  OMAIRA Timmons    Diarrhea 1/24/2025     Resolved by  Estevan Harper DO    Nausea 1/24/2025     Resolved by  Estevan Harper DO        Discharging Physician / Practitioner: Estevan Harper DO  PCP: Anne Chandra MD  Admission Date:   Admission Orders (From admission, onward)       Ordered        01/01/25 1042  INPATIENT ADMISSION  Once                          Discharge Date:  01/24/25    Consultations During Hospital Stay:  Vascular surgery  Podiatry  Cardiology  Acute pain service  PMR  GI    Procedures Performed:   Right above-knee amputation    Significant Findings / Test Results:   As above    Incidental Findings:   none    Test Results Pending at Discharge (will require follow up):   none     Outpatient Tests Requested:  Outpatient podiatry follow-up    Complications:  none    Reason for Admission:       Hospital Course:   Royce Rene is a 79 y.o. male patient who originally presented to the hospital on 1/1/2025 due to poor healing wound and right great toe dry gangrene  In the lying history of stage IV prostate cancer, PAD, chronic CHF, ischemic cardiomyopathy and CAD  Presented to [facility] with failure to thrive, generalized weakness following chemotherapy.  Patient was pending rehab placement when he was noted to have right foot gangrene and poor healing wound on the left.  Unfortunately, no revascularization options, underwent right AKA 1/8.    Overall relatively stable, has been having diarrhea despite Imodium.  Was seen by GI.  He is now tolerating p.o., nausea and diarrhea has resolved    Currently patient is in stable condition, cleared for discharge to rehab with plan for outpatient PCP, vascular surgery and podiatry follow-up        Please see above list of diagnoses and related plan for additional information.     Condition at Discharge: stable    Discharge Day Visit / Exam:   Subjective:    Patient seen and examined  Comfortable in bed  No event overnight  Pain is better  No nausea vomiting or diarrhea  Vitals: Blood Pressure: 120/79 (01/24/25 1006)  Pulse: 81 (01/24/25 1006)  Temperature: 98.1 °F (36.7 °C) (01/24/25 0700)  Temp Source: Oral (01/24/25 0700)  Respirations: 18 (01/24/25 0700)  Weight - Scale: 86.4 kg (190 lb 7.6 oz) (01/24/25 0551)  SpO2: 95 % (01/24/25 1006)  Physical Exam   Patient is awake alert in no acute distress  Obese chronically ill-appearing, ,  pale  Lungs clear to auscultation bilateral anteriorly  Heart positive S1-S2 no murmur  Abdomen soft nontender  Status post right AKA, dressing in place  Left lower extremity dressing in place    Discussion with Family: Attempted to update  (son) via phone. Unable to contact.    Discharge instructions/Information to patient and family:   See after visit summary for information provided to patient and family.      Provisions for Follow-Up Care:  See after visit summary for information related to follow-up care and any pertinent home health orders.      Mobility at time of Discharge:   Basic Mobility Inpatient Raw Score: 9  JH-HLM Goal: 3: Sit at edge of bed  JH-HLM Achieved: 3: Sit at edge of bed  HLM Goal achieved. Continue to encourage appropriate mobility.     Disposition:   Other Skilled Nursing Facility at [facility]    Planned Readmission: no    Discharge Medications:  See after visit summary for reconciled discharge medications provided to patient and/or family.      Administrative Statements   Discharge Statement:  I have spent a total time of 35 minutes in caring for this patient on the day of the visit/encounter. >30 minutes of time was spent on: Counseling / Coordination of care, Documenting in the medical record, Reviewing / ordering tests, medicine, procedures  , and Communicating with other healthcare professionals .    **Please Note: This note may have been constructed using a voice recognition system**

## 2025-01-24 NOTE — CASE MANAGEMENT
[facility] has received APPROVED authorization.  Insurance:   [employer]  Auth obtained via fax.  Authorization received for: SNF  Facility: [facility]   Authorization #: [id number]   Start of Care: 1/24  Next Review Date: 1/29  Continued Stay Care Coordinator: Viri PERES#: [phone]  Submit next review to: [phone]     Care Manager notified: Jose David Elias    Please reach out to  for updates on any clinical information.

## 2025-01-24 NOTE — ASSESSMENT & PLAN NOTE
Wt Readings from Last 3 Encounters:   01/24/25 86.4 kg (190 lb 7.6 oz)   12/26/24 100 kg (220 lb 14.4 oz)   12/13/24 103 kg (227 lb)   Cardiac echo on 8/2/2023 with EF 30 - 35%  Continue metoprolol, Jardiance, lasix, losartan  Daily weights, I/Os

## 2025-01-27 ENCOUNTER — TELEPHONE (OUTPATIENT)
Dept: VASCULAR SURGERY | Facility: CLINIC | Age: 80
End: 2025-01-27

## 2025-01-27 NOTE — UTILIZATION REVIEW
NOTIFICATION OF ADMISSION DISCHARGE   This is a Notification of Discharge from [facility]. Please be advised that this patient has been discharge from our facility. Below you will find the admission and discharge date and time including the patient’s disposition.   UTILIZATION REVIEW CONTACT:  Andre Pearson  Utilization   Network Utilization Review Department  Phone: [phone] x carefully listen to the prompts. All voicemails are confidential.  Email: [email or web address]     ADMISSION INFORMATION  PRESENTATION DATE: 1/1/2025  8:40 AM  OBERVATION ADMISSION DATE: N/A  INPATIENT ADMISSION DATE: 1/1/25  8:40 AM   DISCHARGE DATE: 1/24/2025  3:53 PM   DISPOSITION:Non [facility] SNF/TCU/SNU    Network Utilization Review Department  ATTENTION: Please call with any questions or concerns to [phone] and carefully listen to the prompts so that you are directed to the right person. All voicemails are confidential.   For Discharge needs, contact Care Management DC Support Team at [phone] opt. 2  Send all requests for admission clinical reviews, approved or denied determinations and any other requests to dedicated fax number below belonging to the campus where the patient is receiving treatment. List of dedicated fax numbers for the Facilities:  FACILITY NAME UR FAX NUMBER   ADMISSION DENIALS (Administrative/Medical Necessity) [phone]   DISCHARGE SUPPORT TEAM ([facility]) [phone]   PARENT CHILD HEALTH (Maternity/NICU/Pediatrics) [phone]   [facility] [phone]   [facility] [phone]   [facility] [phone]   [facility] [phone]   [facility] [phone]   [facility] [phone]   [facility] [phone]   [facility] [phone]    [facility] [phone]   [facility] [phone]   [facility] [phone]   [facility] [phone]

## 2025-01-27 NOTE — TELEPHONE ENCOUNTER
Vascular Nurse Navigator Post Op Call    Procedure:  Right - RIGHT AMPUTATION ABOVE KNEE (AKA)     Date of Procedure:  1/8/25    Surgeon:    * Serina Madison,  - Primary     * Joshua Amador MD - Assisting     * Kyle Paulino PA-C - Assisting    Discharge Date:  1/24/25    Discharge Disposition: Rehab - [facility]    Chest Pain?:No    Shortness of Breath?:No     Increased Pain, Swelling, Warmth, or Redness?:No    Purulent Drainage?:No    Foul- smelling drainage?: No    Signs of dehiscence?:No    Fever/Chills?:No    Bleeding?:No    Anticoagulation pt was discharged on post op?: Aspirin and Clopidogrel (Plavix)    Statin pt was discharged on post op?: Lipitor (atorvastatin)    Uncontrolled Pain?:No      Reviewed discharge instructions and incision care with patient.        NEXT OFFICE VISIT SCHEDULED:  2/3/25 at 1 pm with Dr. Madison at [facility]    Transportation       Any further questions/concerns?   Spoke with Evelyn, nurse at [facility], in regards to above.  She stated patient is doing good since discharge.  She stated patient's incision looks good and with out any signs of infection.  Reviewed discharge mediations - Aspirin and Plavix.  All questions answered.  No concerns expressed at this time.

## 2025-01-28 ENCOUNTER — TELEPHONE (OUTPATIENT)
Age: 80
End: 2025-01-28

## 2025-01-28 NOTE — TELEPHONE ENCOUNTER
[facility], no answer and not able to leave message.

## 2025-01-28 NOTE — TELEPHONE ENCOUNTER
Received call from Tracey at [facility].  Pt is s/p R AKA 18/25.  Pt has a post op visit 2/3/25 with Dr. Cook.  Tracey is asking if pt needs to come to visit, or if their wound care nurse there at the facility can remove the staples.  She said pt has concerns with paying for the visit.  Please advise.    Call back # [phone]

## 2025-01-29 ENCOUNTER — TELEPHONE (OUTPATIENT)
Age: 80
End: 2025-01-29

## 2025-01-29 NOTE — TELEPHONE ENCOUNTER
Tracey calling back as she said our podiatry number was on discharge papers/explained we are podiatry and once pts have BKA we no longer treat as we cover below knee and down. I did give her the vascular number to call. No further contact needed.

## 2025-01-29 NOTE — TELEPHONE ENCOUNTER
[facility]  Informed her of message above.  Tracey verbalized understanding and stated the transportation for the pt will be very expensive and she does not know if the pt can afford it.  She is going to look into transporting him via wheelchair instead of a stretcher to see if that would be more cost effective for him.

## 2025-01-29 NOTE — TELEPHONE ENCOUNTER
Caller: [facility]    Doctor: Antonio    Reason for call: Tracey wants to know if they can take Royce Frees yana out at the facility..  Can someone get back to her?  Thank you.     Call back#: [phone]

## 2025-01-29 NOTE — TELEPHONE ENCOUNTER
Tracey calling in from [facility] calling in to see if we are able to do a virtual appointment for Royce on 2/3/25. Pt is not able to afford the transportation and does already have an appointment with vascular surgery at 1300. Tracey wanted me to inform Lisa if she was not already aware that pt is an amputee now. Pt had AKA on 1/8/25.    Thank you.  
Patient c/o epigastric pain, sweating and palpitations x 2 days. Endorsing dizziness and weakness. Denies chest pain, shortness of breath, fever, cough, vomiting, diarrhea. Bradycardic, patient denies history of bradycardia. Phx pulmonary fibrosis, HTN, HLD, diverticulitis, gallstones

## 2025-01-30 DIAGNOSIS — E79.0 HYPERURICEMIA: ICD-10-CM

## 2025-01-31 RX ORDER — ALLOPURINOL 100 MG/1
100 TABLET ORAL DAILY
Qty: 30 TABLET | Refills: 0 | OUTPATIENT
Start: 2025-01-31

## 2025-01-31 NOTE — TELEPHONE ENCOUNTER
Emergency Department Attending Physician Note      Patient: Reynaldo Gayle Age: 22 month old Sex: male   MRN: [id number] : 2019 Encounter Date: 2021           HISTORY OF PRESENT ILLNESS  This is a 22 month old male w/ cough congestion x 2 days, fever Tm 101 started yesterday, slightly decreased po intake but is still making normal wet diapers.         Teaching-Supervisory Addendum-Brief   I participated in the following activities of this patients care: the medical history, the physical exam, medical decision making, the procedure.     I personally performed: supervision of the patient's care, the medical history, the physical exam, the medical decision making.     The case was discussed with: the resident    Procedures: I directly supervised any procedure(s) noted by resident    Evaluation and management service: I agree with the evaluation and management decisions made in this patient's care.     REVIEW OF SYSTEMS  Review of Systems   Constitutional: Positive for fever. Negative for activity change, appetite change and irritability.   HENT: Positive for congestion. Negative for drooling, sore throat and trouble swallowing.    Eyes: Negative.  Negative for discharge.   Respiratory: Positive for cough. Negative for apnea, choking, wheezing and stridor.    Cardiovascular: Negative.  Negative for palpitations.   Gastrointestinal: Negative.  Negative for abdominal distention, abdominal pain, constipation, diarrhea, nausea and vomiting.   Endocrine: Negative.    Genitourinary: Negative.  Negative for dysuria, frequency and hematuria.   Musculoskeletal: Negative.  Negative for arthralgias, back pain, gait problem, joint swelling, myalgias and neck stiffness.   Skin: Negative.  Negative for color change, pallor and rash.   Allergic/Immunologic: Negative.    Neurological: Negative.  Negative for seizures, speech difficulty and headaches.   Hematological: Negative.  Does not bruise/bleed easily.  Incorrect pod, sending to appropriate pod for approval.      Psychiatric/Behavioral: Negative.           PAST HISTORY       No past medical history on file. No past surgical history on file.          Social History     Tobacco Use   • Smoking status: Not on file   Substance Use Topics   • Alcohol use: Not on file   • Drug use: Not on file    No family history on file.          Prior to Admission medications    Not on File    No Known Allergies        Additional Information:     PHYSICAL EXAMINATION  Visit Vitals  Pulse 125   Temp 100 °F (37.8 °C) (Rectal)   Resp 32   Wt 13.2 kg (29 lb 1.6 oz)   SpO2 99%       Physical Exam  Vitals and nursing note reviewed.   Constitutional:       General: He is active. He is not in acute distress.     Appearance: Normal appearance. He is not toxic-appearing.      Comments: Alert, active, nontoxic   HENT:      Head: Normocephalic and atraumatic.      Right Ear: Tympanic membrane, ear canal and external ear normal.      Left Ear: Tympanic membrane, ear canal and external ear normal.      Nose: Congestion and rhinorrhea present.      Comments: Copious nasal secretions with cough, no stridor, no wheezes rales occasional rhonchi     Mouth/Throat:      Mouth: Mucous membranes are moist.      Pharynx: No oropharyngeal exudate or posterior oropharyngeal erythema.   Eyes:      Extraocular Movements: Extraocular movements intact.      Conjunctiva/sclera: Conjunctivae normal.      Pupils: Pupils are equal, round, and reactive to light.   Cardiovascular:      Rate and Rhythm: Normal rate and regular rhythm.      Pulses: Normal pulses.      Heart sounds: Normal heart sounds. No murmur heard.   No friction rub. No gallop.       Comments: 2+ radial and femoral pulses, cap refill <2 sec, wwp  Pulmonary:      Effort: Pulmonary effort is normal. No nasal flaring or retractions.      Breath sounds: Normal breath sounds. No stridor. No wheezing.   Abdominal:      General: Abdomen is flat. Bowel sounds are normal. There is no distension.      Palpations:  Abdomen is soft. There is no mass.      Tenderness: There is no abdominal tenderness. There is no guarding or rebound.   Musculoskeletal:         General: No swelling, tenderness, deformity or signs of injury. Normal range of motion.      Cervical back: Normal range of motion and neck supple. No rigidity.   Skin:     General: Skin is warm and dry.      Capillary Refill: Capillary refill takes less than 2 seconds.      Coloration: Skin is not cyanotic, mottled or pale.      Findings: No erythema or petechiae.   Neurological:      General: No focal deficit present.      Mental Status: He is alert and oriented for age.      Sensory: No sensory deficit.      Motor: No weakness.      Gait: Gait normal.             Labs: No results found for this visit on 08/04/21.  Imaging:   No orders to display     Procedures      MEDICAL DECISION MAKING  This is a 22 month old male with fever cough congestion, copious nasal secretions, likely consistent with bronchiolitis, given adequate hydration no respiratory distress no hypoxia or tachypnea, appropriate for outpatient management discussed supportive care with family, strict return precautions, close follow-up with primary care physician which is in the peds clinic at [place], and outpatient Covid swab. Family is amenable to this plan.    ED Course as of Aug 04 0831   Wed Aug 04, 2021   0828 Patient reassessed and tolerated 4 ounces of his milk.  Patient suctioned with a lot of clear secretions cleared    [NT]      ED Course User Index  [NT] Karly Carey DO     ED Medication Orders (From admission, onward)    None          IMPRESSION AND PLAN  Diagnosis:   1. Viral URI with cough      Disposition: Discharge home in stable condition       Vin Loja MD  Emergency Medicine, Attending Physician       Vin Loja MD  08/04/21 0804       Vin Loja MD  08/04/21 0831

## 2025-02-03 ENCOUNTER — TELEPHONE (OUTPATIENT)
Dept: HEMATOLOGY ONCOLOGY | Facility: CLINIC | Age: 80
End: 2025-02-03

## 2025-02-03 ENCOUNTER — TELEPHONE (OUTPATIENT)
Age: 80
End: 2025-02-03

## 2025-02-03 NOTE — PROGRESS NOTES
Attempted virtual visit.  Patient did not connect.    Called patient.  Did not answer.  Unable to leave message as mailbox full.

## 2025-02-03 NOTE — TELEPHONE ENCOUNTER
Caller:  [facility]     Doctor: Dr. Cook     Reason for call:  Patient nursing home calling in [facility] pt fell out of bed and would have to reschedule called office and was advised to schedule for 2/12/2025 at [place]    Call back#: [phone]

## 2025-02-04 ENCOUNTER — TELEMEDICINE (OUTPATIENT)
Dept: HEMATOLOGY ONCOLOGY | Facility: CLINIC | Age: 80
End: 2025-02-04
Payer: COMMERCIAL

## 2025-02-04 ENCOUNTER — TELEPHONE (OUTPATIENT)
Dept: HEMATOLOGY ONCOLOGY | Facility: CLINIC | Age: 80
End: 2025-02-04

## 2025-02-04 ENCOUNTER — TELEPHONE (OUTPATIENT)
Age: 80
End: 2025-02-04

## 2025-02-04 DIAGNOSIS — D64.9 ANEMIA, UNSPECIFIED TYPE: ICD-10-CM

## 2025-02-04 DIAGNOSIS — I73.9 PAD (PERIPHERAL ARTERY DISEASE) (HCC): ICD-10-CM

## 2025-02-04 DIAGNOSIS — E43 SEVERE PROTEIN-CALORIE MALNUTRITION (HCC): ICD-10-CM

## 2025-02-04 DIAGNOSIS — Z89.611 S/P AKA (ABOVE KNEE AMPUTATION) UNILATERAL, RIGHT (HCC): ICD-10-CM

## 2025-02-04 DIAGNOSIS — C61 PROSTATE CANCER (HCC): Primary | ICD-10-CM

## 2025-02-04 DIAGNOSIS — C79.51 MALIGNANT NEOPLASM METASTATIC TO BONE (HCC): ICD-10-CM

## 2025-02-04 PROCEDURE — 99215 OFFICE O/P EST HI 40 MIN: CPT | Performed by: PHYSICIAN ASSISTANT

## 2025-02-04 NOTE — ASSESSMENT & PLAN NOTE
Heavily pretreated metastatic prostate Cancer previously treated [facility]. transferred care to [facility] in October 2021.       History of Francisco Javier 7 prostate cancer s/p treatment with external beam radiation 7200cGy from 10/14/2003 to 12/11/2003.   He also got neoadjuvant and adjuvant LHRH for 9 months after radiation.      July 2008 with a rising PSA and bony metastasis identified.  initiated on Lupron      PSA was increasing and bone scan identified new lesions  3/13/2015 he was initiated on abiraterone        6/2017 progression of bony metastatic disease on imaging.  Treatment changed to enzalutamide.       2/2019 palliative Radiation to sacrum     March 2020 - March 2021 Docetaxel.       3/2021 - 10/2021 no cancer directed treatment as he had significant cardiac issues and was seen in [place].     10/[facility]     Lupron every 3 months     Xofigo from 11/4/2021 to 4/2022 5/2022 rechallenged with enzalutamide   8/2022 Xgeva was added for bone metastasis     May 13, 2023  (Pluvicto) first dose    PSA came down significantly after 3 cycles to 37 7/10/23 (245 5/15/23)     6/2023 abiraterone + prednisone also initiated.    10/12/23 PSA 24      12/14/2023 Dose #6      12/15/23- PLUVICTO THERAPY SPECT CT   Decreased radiotracer uptake in previously seen osseous lesions compatible with therapy response.       PSMA scan on 4/2024 showed decreased activity in multiple bony lesions  CAT scan of the brain showed questionable lesion in the right frontal lobe without associating edema, I could not do an MRI because of the pacer     Repeat CAT scan of the brain on 7/18/2024 showed stable hyperdense mildly enhancing lesion in the postcentral gyrus of the right parietal lobe    Biochemical Relapse  3/2024   7/2024     8/7/24 Jevtana 15 mg/m² every 3 weeks with Neulasta secondary to chemo induced neutropenia initiated.    11/2024  (relatively stable)    6th  dose was 12/12/2024    He did have some SE of diarrhea, N/V.    Treatment on hold 2nd to PAD requiring AKA 1/2025    Spoke with patient and his nurse, Vanessa.   Discussed that given his recent hospitalization and AKA, recommend deferment of chemotherapy 2nd to SE he experienced previously, Weakness.    Patient states 'I think so, too.'    Discussed reassessing how patient is in about 2 months.  He is in agreement.    PSA, CBCD, CMP requested in 2 months.         Orders:  •  CBC and differential; Future  •  Comprehensive metabolic panel; Future  •  PSA Total, Diagnostic; Future

## 2025-02-04 NOTE — ASSESSMENT & PLAN NOTE
Malnutrition Findings:          See above AKA.                       BMI Findings:           There is no height or weight on file to calculate BMI.

## 2025-02-04 NOTE — PROGRESS NOTES
Name: Royce Rene      : 1945      MRN: [id number]  Encounter Provider: Lisa Barnard PA-C  Encounter Date: 2025   Encounter department: [facility]  :  Assessment & Plan  Prostate cancer (HCC)  Heavily pretreated [facility]. [facility] to [facility] in 2021.       History of Francisco Javier 7 prostate cancer s/p treatment with external beam radiation 7200cGy from 10/14/2003 to 2003.   He also got neoadjuvant and adjuvant LHRH for 9 months after radiation.      2008 with a rising PSA and bony metastasis identified.  initiated on Lupron      PSA was increasing and bone scan identified new lesions  3/13/2015 he was initiated on abiraterone        2017 progression of bony metastatic disease on imaging.  Treatment changed to enzalutamide.       2019 palliative Radiation to sacrum     2020 - 2021 Docetaxel.       3/2021 - 10/2021 no cancer directed treatment as he had significant cardiac issues and was seen in [place].     [facility]     Lupron every 3 months     Xofigo from 2021 to 2022 rechallenged with enzalutamide   2022 Xgeva was added for bone metastasis     May 13, 2023  (Pluvicto) first dose    PSA came down significantly after 3 cycles to 37 7/10/23 (245 5/15/23)     2023 abiraterone + prednisone also initiated.    10/12/23 PSA 24      2023 Dose #6      12/15/23- PLUVICTO THERAPY SPECT CT   Decreased radiotracer uptake in previously seen osseous lesions compatible with therapy response.       PSMA scan on 2024 showed decreased activity in multiple bony lesions  CAT scan of the brain showed questionable lesion in the right frontal lobe without associating edema, I could not do an MRI because of the pacer     Repeat CAT scan of the brain on 2024 showed stable hyperdense mildly enhancing lesion in  the postcentral gyrus of the right parietal lobe    Biochemical Relapse  3/2024   7/2024     8/7/24 Jevtana 15 mg/m² every 3 weeks with Neulasta secondary to chemo induced neutropenia initiated.    11/2024  (relatively stable)    6th dose was 12/12/2024    He did have some SE of diarrhea, N/V.    Treatment on hold 2nd to PAD requiring AKA 1/2025    Spoke with patient and his nurse, Vanessa.   Discussed that given his recent hospitalization and AKA, recommend deferment of chemotherapy 2nd to SE he experienced previously, Weakness.    Patient states 'I think so, too.'    Discussed reassessing how patient is in about 2 months.  He is in agreement.    PSA, CBCD, CMP requested in 2 months.         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  Most recent dose 8/21/24       S/P AKA (above knee amputation) unilateral, right (HCC)  Admitted 12/26/24 - 1/1/25 at [facility].    Presenting Sx were, nausea, vomiting, diarrhea, poor intake.  C. difficile and enteric panel were negative.  Had right ankle pain.  He had ulcer of his left foot.   X-ray right foot was negative for osteomyelitis    He had dry gangrene of his right big toe  Transferred to hospitals 1/1/25.  Admitted through 1/24/25.  Fortunately, he had no revascularization options and underwent right AKA 1/8/2025.  Time of discharge it was noted that he was tolerating p.o., nausea and diarrhea resolved.    He was discharged to [facility].  Nurse, Vanessa.    P [phone]   F [phone].    Patient states overall, he is doing well.  He does have some phantom limb pain.    Either patient nor nurse were aware at this time if long-term skilled nursing will be needed.         PAD (peripheral artery disease) (HCC)  S/P AKA 1/2025       Severe protein-calorie malnutrition (HCC)  Malnutrition Findings:           See above AKA.                       BMI Findings:           There is no height or weight on file to calculate BMI.            Anemia, unspecified type  12/28/24 iron saturation 10%; ferritin > 1000     1/12/25 transfusion 1 U prbc for hgb 7 range    Hgb 10.3 1/23/25  Hgb 10.3 1/30/25  (inflammation)    Will continue to observe.   Orders:  •  Iron Panel (Includes Ferritin, Iron Sat%, Iron, and TIBC); Future        History of Present Illness   No chief complaint on file.    Pertinent Medical History   02/04/25: Royce Rene is a 79 year old male seen initially by Dr. Toledo October 5, 2021 regarding metastatic castration resistant prostate cancer.     He has multiple medical conditions including a significant cardiovascular history, heart failure, PAD.  [facility]       [facility] he was initially diagnosed with a Francisco Javier 7 prostate cancer s/p treatment with external beam radiation 7200cGy from 10/14/2003 to 12/11/2003.   He also got neoadjuvant and adjuvant LHRH for 9 months after radiation.       He was observed until July of 2008 when he was noted to have rising PSA and imaging done at that time showed new bony lesions.    He received Zoladex and later leuprolide.       PSA was increasing and bone scan identified new lesions.  3/13/2015 he was initiated on abiraterone     6/2017 He had evidence of aggressive bony metastatic disease on imaging.  Treatment changed to Enzalutamide.       His PSA continued to rise and he had progressive bony metastases including to the sacrum for which he received a course of palliative radiation, which was completed in February of 2019.  It was 3000 cGy as of radiation in 10 fractions.      He was then restarted on enzalutamide but a scan in December of 2019 showed worsening bone metastases so he was started on docetaxel in March of 2020. He was kept on this for approximately a year with his PSA slowly rising.   Cycle 15 1/28/2021.   After March 2021, he was admitted to a hospital for cardiac issues and then was lost to follow up with his previous oncologist.     Established care with Dr. Toledo  10/2021.    11/2021 He was initiated on Lupron every 3 months     He was referred to nuclear Medicine and treated with Xofigo from 11/4/2021 to 4/2022 5/2022 rechallenged with enzalutamide   8/2022 Xgeva was added for bone metastasis   May 18  2023-12/14/2023 Pluvicto   After 3 cycles PSA came down to 32 however in     July 2024 PSA went up to 285, therapy changed to Jevtana 20 mg/m² every 3 weeks        PSMA scan on 4/25/2024 showed significant decrease in the bony metastasis activity, persistent mild activity in the left aspect of the prostate gland, small foci of mild PSMA activity in the right cerebellum, he could not do the MRI of the brain so CAT scan showed stable sclerotic metastatic lesion within the right occipital calvarium lesion in the right anterior frontal lobe.    Admitted 12/26/24 - 1/1/25 at [facility].    Presenting Sx were, nausea, vomiting, diarrhea, poor intake.  C. difficile and enteric panel were negative.  Had right ankle pain.  He had ulcer of his left foot.   X-ray right foot was negative for osteomyelitis    He had dry gangrene of his right big toe  Transferred to [facility] 1/1/25.  Admitted through 1/24/25.  Fortunately, he had no revascularization options and underwent right AKA 1/8/2025.  Time of discharge it was noted that he was tolerating p.o., nausea and diarrhea resolved.    He was discharged to [facility].  Nurse, Vanessa.  [phone]          Review of Systems        Objective   There were no vitals taken for this visit.    Physical Exam    Labs: I have reviewed the following labs:  Results for orders placed or performed during the hospital encounter of 01/01/25   Stool Enteric Bacterial Panel by PCR    Specimen: Rectum; Stool   Result Value Ref Range    Salmonella sp PCR Negative Negative    Shigella  sp/Enteroinvasive E. coli (EIEC) PCR Negative Negative    Campylobacter sp (jejuni and coli) PCR Negative Negative    Shiga toxin 1/Shiga toxin 2 genes PCR Negative Negative   Clostridium difficile toxin by PCR with EIA    Specimen: Per Rectum; Stool   Result Value Ref Range    C.difficile toxin by PCR Negative Negative   Calprotectin,Fecal   Result Value Ref Range    Calprotectin, Fecal 320 (H) <=120 µg/g   Pancreatic elastase, fecal   Result Value Ref Range    Pancreatic Elastase, Fecal 796.0 >=200.0 ug/g   Giardia antigen    Specimen: Stool   Result Value Ref Range    Giardia Ag, Stl Negative Negative   Fecal fat, qualitative   Result Value Ref Range    Fat Qual Neutral, Stl Increased     Fat,Totall Increased    Result Value Ref Range    Magnesium 1.8 (L) 1.9 - 2.7 mg/dL   Result Value Ref Range    Phosphorus 3.0 2.3 - 4.1 mg/dL   Basic metabolic panel   Result Value Ref Range    Sodium 139 135 - 147 mmol/L    Potassium 4.0 3.5 - 5.3 mmol/L    Chloride 109 (H) 96 - 108 mmol/L    CO2 21 21 - 32 mmol/L    ANION GAP 9 4 - 13 mmol/L    BUN 13 5 - 25 mg/dL    Creatinine 0.71 0.60 - 1.30 mg/dL    Glucose 88 65 - 140 mg/dL    Calcium 8.2 (L) 8.4 - 10.2 mg/dL    eGFR 89 ml/min/1.73sq m   CBC and differential   Result Value Ref Range    WBC 12.83 (H) 4.31 - 10.16 Thousand/uL    RBC 2.91 (L) 3.88 - 5.62 Million/uL    Hemoglobin 8.6 (L) 12.0 - 17.0 g/dL    Hematocrit 27.8 (L) 36.5 - 49.3 %    MCV 96 82 - 98 fL    MCH 29.6 26.8 - 34.3 pg    MCHC 30.9 (L) 31.4 - 37.4 g/dL    RDW 14.7 11.6 - 15.1 %    MPV 10.2 8.9 - 12.7 fL    Platelets 419 (H) 149 - 390 Thousands/uL    nRBC 0 /100 WBCs    Segmented % 82 (H) 43 - 75 %    Immature Grans % 1 0 - 2 %    Lymphocytes % 7 (L) 14 - 44 %    Monocytes % 8 4 - 12 %    Eosinophils Relative 1 0 - 6 %    Basophils Relative 1 0 - 1 %    Absolute Neutrophils 10.69 (H) 1.85 - 7.62 Thousands/µL    Absolute Immature Grans 0.11 0.00 - 0.20 Thousand/uL    Absolute Lymphocytes 0.87 0.60 - 4.47  Thousands/µL    Absolute Monocytes 1.00 0.17 - 1.22 Thousand/µL    Eosinophils Absolute 0.10 0.00 - 0.61 Thousand/µL    Basophils Absolute 0.06 0.00 - 0.10 Thousands/µL   Basic metabolic panel   Result Value Ref Range    Sodium 139 135 - 147 mmol/L    Potassium 3.9 3.5 - 5.3 mmol/L    Chloride 107 96 - 108 mmol/L    CO2 24 21 - 32 mmol/L    ANION GAP 8 4 - 13 mmol/L    BUN 14 5 - 25 mg/dL    Creatinine 0.68 0.60 - 1.30 mg/dL    Glucose 89 65 - 140 mg/dL    Calcium 8.2 (L) 8.4 - 10.2 mg/dL    eGFR 90 ml/min/1.73sq m   CBC   Result Value Ref Range    WBC 12.40 (H) 4.31 - 10.16 Thousand/uL    RBC 2.91 (L) 3.88 - 5.62 Million/uL    Hemoglobin 8.5 (L) 12.0 - 17.0 g/dL    Hematocrit 27.8 (L) 36.5 - 49.3 %    MCV 96 82 - 98 fL    MCH 29.2 26.8 - 34.3 pg    MCHC 30.6 (L) 31.4 - 37.4 g/dL    RDW 14.6 11.6 - 15.1 %    Platelets 455 (H) 149 - 390 Thousands/uL    MPV 10.0 8.9 - 12.7 fL   Protime-INR   Result Value Ref Range    Protime 14.5 12.3 - 15.0 seconds    INR 1.10 0.85 - 1.19   Basic metabolic panel   Result Value Ref Range    Sodium 141 135 - 147 mmol/L    Potassium 4.0 3.5 - 5.3 mmol/L    Chloride 108 96 - 108 mmol/L    CO2 23 21 - 32 mmol/L    ANION GAP 10 4 - 13 mmol/L    BUN 15 5 - 25 mg/dL    Creatinine 0.68 0.60 - 1.30 mg/dL    Glucose 93 65 - 140 mg/dL    Calcium 8.5 8.4 - 10.2 mg/dL    eGFR 90 ml/min/1.73sq m   CBC   Result Value Ref Range    WBC 12.97 (H) 4.31 - 10.16 Thousand/uL    RBC 2.90 (L) 3.88 - 5.62 Million/uL    Hemoglobin 8.5 (L) 12.0 - 17.0 g/dL    Hematocrit 27.8 (L) 36.5 - 49.3 %    MCV 96 82 - 98 fL    MCH 29.3 26.8 - 34.3 pg    MCHC 30.6 (L) 31.4 - 37.4 g/dL    RDW 14.6 11.6 - 15.1 %    Platelets 474 (H) 149 - 390 Thousands/uL    MPV 10.1 8.9 - 12.7 fL   CBC and differential   Result Value Ref Range    WBC 12.30 (H) 4.31 - 10.16 Thousand/uL    RBC 2.87 (L) 3.88 - 5.62 Million/uL    Hemoglobin 8.5 (L) 12.0 - 17.0 g/dL    Hematocrit 27.4 (L) 36.5 - 49.3 %    MCV 96 82 - 98 fL    MCH 29.6 26.8 -  34.3 pg    MCHC 31.0 (L) 31.4 - 37.4 g/dL    RDW 14.6 11.6 - 15.1 %    MPV 9.6 8.9 - 12.7 fL    Platelets 493 (H) 149 - 390 Thousands/uL    nRBC 0 /100 WBCs    Segmented % 82 (H) 43 - 75 %    Immature Grans % 2 0 - 2 %    Lymphocytes % 7 (L) 14 - 44 %    Monocytes % 7 4 - 12 %    Eosinophils Relative 2 0 - 6 %    Basophils Relative 0 0 - 1 %    Absolute Neutrophils 10.17 (H) 1.85 - 7.62 Thousands/µL    Absolute Immature Grans 0.22 (H) 0.00 - 0.20 Thousand/uL    Absolute Lymphocytes 0.87 0.60 - 4.47 Thousands/µL    Absolute Monocytes 0.80 0.17 - 1.22 Thousand/µL    Eosinophils Absolute 0.19 0.00 - 0.61 Thousand/µL    Basophils Absolute 0.05 0.00 - 0.10 Thousands/µL   Result Value Ref Range    Magnesium 1.9 1.9 - 2.7 mg/dL   Comprehensive metabolic panel   Result Value Ref Range    Sodium 137 135 - 147 mmol/L    Potassium 3.8 3.5 - 5.3 mmol/L    Chloride 106 96 - 108 mmol/L    CO2 23 21 - 32 mmol/L    ANION GAP 8 4 - 13 mmol/L    BUN 11 5 - 25 mg/dL    Creatinine 0.70 0.60 - 1.30 mg/dL    Glucose 89 65 - 140 mg/dL    Calcium 8.5 8.4 - 10.2 mg/dL    Corrected Calcium 9.1 8.3 - 10.1 mg/dL    AST 48 (H) 13 - 39 U/L     (H) 7 - 52 U/L    Alkaline Phosphatase 103 34 - 104 U/L    Total Protein 6.2 (L) 6.4 - 8.4 g/dL    Albumin 3.2 (L) 3.5 - 5.0 g/dL    Total Bilirubin 0.46 0.20 - 1.00 mg/dL    eGFR 89 ml/min/1.73sq m   CBC and Platelet   Result Value Ref Range    WBC 12.06 (H) 4.31 - 10.16 Thousand/uL    RBC 2.89 (L) 3.88 - 5.62 Million/uL    Hemoglobin 8.4 (L) 12.0 - 17.0 g/dL    Hematocrit 27.6 (L) 36.5 - 49.3 %    MCV 96 82 - 98 fL    MCH 29.1 26.8 - 34.3 pg    MCHC 30.4 (L) 31.4 - 37.4 g/dL    RDW 14.6 11.6 - 15.1 %    Platelets 506 (H) 149 - 390 Thousands/uL    MPV 9.8 8.9 - 12.7 fL   Comprehensive metabolic panel   Result Value Ref Range    Sodium 142 135 - 147 mmol/L    Potassium 3.6 3.5 - 5.3 mmol/L    Chloride 110 (H) 96 - 108 mmol/L    CO2 22 21 - 32 mmol/L    ANION GAP 10 4 - 13 mmol/L    BUN 15 5 - 25  mg/dL    Creatinine 0.64 0.60 - 1.30 mg/dL    Glucose 89 65 - 140 mg/dL    Calcium 7.8 (L) 8.4 - 10.2 mg/dL    Corrected Calcium 8.6 8.3 - 10.1 mg/dL    AST 34 13 - 39 U/L    ALT 83 (H) 7 - 52 U/L    Alkaline Phosphatase 100 34 - 104 U/L    Total Protein 6.1 (L) 6.4 - 8.4 g/dL    Albumin 3.0 (L) 3.5 - 5.0 g/dL    Total Bilirubin 0.42 0.20 - 1.00 mg/dL    eGFR 93 ml/min/1.73sq m   Result Value Ref Range    Magnesium 1.8 (L) 1.9 - 2.7 mg/dL   Result Value Ref Range    Phosphorus 2.6 2.3 - 4.1 mg/dL   CBC and differential   Result Value Ref Range    WBC 14.00 (H) 4.31 - 10.16 Thousand/uL    RBC 2.77 (L) 3.88 - 5.62 Million/uL    Hemoglobin 8.1 (L) 12.0 - 17.0 g/dL    Hematocrit 27.2 (L) 36.5 - 49.3 %    MCV 98 82 - 98 fL    MCH 29.2 26.8 - 34.3 pg    MCHC 29.8 (L) 31.4 - 37.4 g/dL    RDW 14.7 11.6 - 15.1 %    MPV 9.9 8.9 - 12.7 fL    Platelets 488 (H) 149 - 390 Thousands/uL    nRBC 0 /100 WBCs    Segmented % 85 (H) 43 - 75 %    Immature Grans % 2 0 - 2 %    Lymphocytes % 6 (L) 14 - 44 %    Monocytes % 6 4 - 12 %    Eosinophils Relative 1 0 - 6 %    Basophils Relative 0 0 - 1 %    Absolute Neutrophils 11.79 (H) 1.85 - 7.62 Thousands/µL    Absolute Immature Grans 0.21 (H) 0.00 - 0.20 Thousand/uL    Absolute Lymphocytes 0.89 0.60 - 4.47 Thousands/µL    Absolute Monocytes 0.86 0.17 - 1.22 Thousand/µL    Eosinophils Absolute 0.20 0.00 - 0.61 Thousand/µL    Basophils Absolute 0.05 0.00 - 0.10 Thousands/µL   Comprehensive metabolic panel   Result Value Ref Range    Sodium 141 135 - 147 mmol/L    Potassium 3.9 3.5 - 5.3 mmol/L    Chloride 109 (H) 96 - 108 mmol/L    CO2 21 21 - 32 mmol/L    ANION GAP 11 4 - 13 mmol/L    BUN 12 5 - 25 mg/dL    Creatinine 0.77 0.60 - 1.30 mg/dL    Glucose 88 65 - 140 mg/dL    Calcium 7.4 (L) 8.4 - 10.2 mg/dL    Corrected Calcium 8.1 (L) 8.3 - 10.1 mg/dL    AST 27 13 - 39 U/L    ALT 57 (H) 7 - 52 U/L    Alkaline Phosphatase 94 34 - 104 U/L    Total Protein 6.1 (L) 6.4 - 8.4 g/dL    Albumin 3.1 (L)  3.5 - 5.0 g/dL    Total Bilirubin 0.45 0.20 - 1.00 mg/dL    eGFR 86 ml/min/1.73sq m   Basic metabolic panel   Result Value Ref Range    Sodium 141 135 - 147 mmol/L    Potassium 4.0 3.5 - 5.3 mmol/L    Chloride 111 (H) 96 - 108 mmol/L    CO2 21 21 - 32 mmol/L    ANION GAP 9 4 - 13 mmol/L    BUN 11 5 - 25 mg/dL    Creatinine 0.73 0.60 - 1.30 mg/dL    Glucose 105 65 - 140 mg/dL    Calcium 7.5 (L) 8.4 - 10.2 mg/dL    eGFR 88 ml/min/1.73sq m   CBC and Platelet   Result Value Ref Range    WBC 10.36 (H) 4.31 - 10.16 Thousand/uL    RBC 2.58 (L) 3.88 - 5.62 Million/uL    Hemoglobin 7.5 (L) 12.0 - 17.0 g/dL    Hematocrit 24.7 (L) 36.5 - 49.3 %    MCV 96 82 - 98 fL    MCH 29.1 26.8 - 34.3 pg    MCHC 30.4 (L) 31.4 - 37.4 g/dL    RDW 14.8 11.6 - 15.1 %    Platelets 433 (H) 149 - 390 Thousands/uL    MPV 9.4 8.9 - 12.7 fL   CBC and Platelet   Result Value Ref Range    WBC 9.62 4.31 - 10.16 Thousand/uL    RBC 2.67 (L) 3.88 - 5.62 Million/uL    Hemoglobin 7.3 (L) 12.0 - 17.0 g/dL    Hematocrit 25.0 (L) 36.5 - 49.3 %    MCV 94 82 - 98 fL    MCH 27.3 26.8 - 34.3 pg    MCHC 29.2 (L) 31.4 - 37.4 g/dL    RDW 14.9 11.6 - 15.1 %    Platelets 438 (H) 149 - 390 Thousands/uL    MPV 9.4 8.9 - 12.7 fL   Comprehensive metabolic panel   Result Value Ref Range    Sodium 140 135 - 147 mmol/L    Potassium 3.6 3.5 - 5.3 mmol/L    Chloride 110 (H) 96 - 108 mmol/L    CO2 22 21 - 32 mmol/L    ANION GAP 8 4 - 13 mmol/L    BUN 9 5 - 25 mg/dL    Creatinine 0.68 0.60 - 1.30 mg/dL    Glucose 87 65 - 140 mg/dL    Calcium 8.1 (L) 8.4 - 10.2 mg/dL    Corrected Calcium 9.0 8.3 - 10.1 mg/dL    AST 17 13 - 39 U/L    ALT 27 7 - 52 U/L    Alkaline Phosphatase 88 34 - 104 U/L    Total Protein 5.7 (L) 6.4 - 8.4 g/dL    Albumin 2.9 (L) 3.5 - 5.0 g/dL    Total Bilirubin 0.40 0.20 - 1.00 mg/dL    eGFR 90 ml/min/1.73sq m   CBC and Platelet   Result Value Ref Range    WBC 9.13 4.31 - 10.16 Thousand/uL    RBC 3.40 (L) 3.88 - 5.62 Million/uL    Hemoglobin 9.7 (L) 12.0 - 17.0  g/dL    Hematocrit 31.1 (L) 36.5 - 49.3 %    MCV 92 82 - 98 fL    MCH 28.5 26.8 - 34.3 pg    MCHC 31.2 (L) 31.4 - 37.4 g/dL    RDW 15.9 (H) 11.6 - 15.1 %    Platelets 448 (H) 149 - 390 Thousands/uL    MPV 9.2 8.9 - 12.7 fL   Basic metabolic panel   Result Value Ref Range    Sodium 139 135 - 147 mmol/L    Potassium 3.5 3.5 - 5.3 mmol/L    Chloride 106 96 - 108 mmol/L    CO2 22 21 - 32 mmol/L    ANION GAP 11 4 - 13 mmol/L    BUN 13 5 - 25 mg/dL    Creatinine 0.68 0.60 - 1.30 mg/dL    Glucose 87 65 - 140 mg/dL    Calcium 7.9 (L) 8.4 - 10.2 mg/dL    eGFR 90 ml/min/1.73sq m   Sodium, stool   Result Value Ref Range    Sodium, Stl 92 mmol/L   CBC and differential   Result Value Ref Range    WBC 8.76 4.31 - 10.16 Thousand/uL    RBC 3.38 (L) 3.88 - 5.62 Million/uL    Hemoglobin 9.6 (L) 12.0 - 17.0 g/dL    Hematocrit 31.5 (L) 36.5 - 49.3 %    MCV 93 82 - 98 fL    MCH 28.4 26.8 - 34.3 pg    MCHC 30.5 (L) 31.4 - 37.4 g/dL    RDW 15.6 (H) 11.6 - 15.1 %    MPV 9.5 8.9 - 12.7 fL    Platelets 450 (H) 149 - 390 Thousands/uL    nRBC 0 /100 WBCs    Segmented % 81 (H) 43 - 75 %    Immature Grans % 1 0 - 2 %    Lymphocytes % 9 (L) 14 - 44 %    Monocytes % 7 4 - 12 %    Eosinophils Relative 2 0 - 6 %    Basophils Relative 0 0 - 1 %    Absolute Neutrophils 7.10 1.85 - 7.62 Thousands/µL    Absolute Immature Grans 0.11 0.00 - 0.20 Thousand/uL    Absolute Lymphocytes 0.79 0.60 - 4.47 Thousands/µL    Absolute Monocytes 0.60 0.17 - 1.22 Thousand/µL    Eosinophils Absolute 0.13 0.00 - 0.61 Thousand/µL    Basophils Absolute 0.03 0.00 - 0.10 Thousands/µL   Basic metabolic panel   Result Value Ref Range    Sodium 139 135 - 147 mmol/L    Potassium 3.3 (L) 3.5 - 5.3 mmol/L    Chloride 108 96 - 108 mmol/L    CO2 21 21 - 32 mmol/L    ANION GAP 10 4 - 13 mmol/L    BUN 18 5 - 25 mg/dL    Creatinine 0.72 0.60 - 1.30 mg/dL    Glucose 78 65 - 140 mg/dL    Calcium 7.7 (L) 8.4 - 10.2 mg/dL    eGFR 88 ml/min/1.73sq m   Result Value Ref Range    Magnesium 2.0  1.9 - 2.7 mg/dL   Basic metabolic panel   Result Value Ref Range    Sodium 137 135 - 147 mmol/L    Potassium 3.4 (L) 3.5 - 5.3 mmol/L    Chloride 106 96 - 108 mmol/L    CO2 22 21 - 32 mmol/L    ANION GAP 9 4 - 13 mmol/L    BUN 20 5 - 25 mg/dL    Creatinine 0.72 0.60 - 1.30 mg/dL    Glucose 78 65 - 140 mg/dL    Calcium 7.5 (L) 8.4 - 10.2 mg/dL    eGFR 88 ml/min/1.73sq m   CBC   Result Value Ref Range    WBC 7.72 4.31 - 10.16 Thousand/uL    RBC 3.49 (L) 3.88 - 5.62 Million/uL    Hemoglobin 10.0 (L) 12.0 - 17.0 g/dL    Hematocrit 32.2 (L) 36.5 - 49.3 %    MCV 92 82 - 98 fL    MCH 28.7 26.8 - 34.3 pg    MCHC 31.1 (L) 31.4 - 37.4 g/dL    RDW 15.9 (H) 11.6 - 15.1 %    Platelets 456 (H) 149 - 390 Thousands/uL    MPV 9.6 8.9 - 12.7 fL   Basic metabolic panel   Result Value Ref Range    Sodium 136 135 - 147 mmol/L    Potassium 3.6 3.5 - 5.3 mmol/L    Chloride 106 96 - 108 mmol/L    CO2 18 (L) 21 - 32 mmol/L    ANION GAP 12 4 - 13 mmol/L    BUN 21 5 - 25 mg/dL    Creatinine 0.66 0.60 - 1.30 mg/dL    Glucose 80 65 - 140 mg/dL    Calcium 8.1 (L) 8.4 - 10.2 mg/dL    eGFR 91 ml/min/1.73sq m   Result Value Ref Range    Magnesium 2.0 1.9 - 2.7 mg/dL   CBC and differential   Result Value Ref Range    WBC 6.92 4.31 - 10.16 Thousand/uL    RBC 3.56 (L) 3.88 - 5.62 Million/uL    Hemoglobin 10.0 (L) 12.0 - 17.0 g/dL    Hematocrit 32.3 (L) 36.5 - 49.3 %    MCV 91 82 - 98 fL    MCH 28.1 26.8 - 34.3 pg    MCHC 31.0 (L) 31.4 - 37.4 g/dL    RDW 15.9 (H) 11.6 - 15.1 %    MPV 9.3 8.9 - 12.7 fL    Platelets 442 (H) 149 - 390 Thousands/uL    nRBC 0 /100 WBCs    Segmented % 75 43 - 75 %    Immature Grans % 1 0 - 2 %    Lymphocytes % 14 14 - 44 %    Monocytes % 7 4 - 12 %    Eosinophils Relative 2 0 - 6 %    Basophils Relative 1 0 - 1 %    Absolute Neutrophils 5.18 1.85 - 7.62 Thousands/µL    Absolute Immature Grans 0.07 0.00 - 0.20 Thousand/uL    Absolute Lymphocytes 0.98 0.60 - 4.47 Thousands/µL    Absolute Monocytes 0.51 0.17 - 1.22 Thousand/µL     Eosinophils Absolute 0.14 0.00 - 0.61 Thousand/µL    Basophils Absolute 0.04 0.00 - 0.10 Thousands/µL   Comprehensive metabolic panel   Result Value Ref Range    Sodium 135 135 - 147 mmol/L    Potassium 3.9 3.5 - 5.3 mmol/L    Chloride 105 96 - 108 mmol/L    CO2 18 (L) 21 - 32 mmol/L    ANION GAP 12 4 - 13 mmol/L    BUN 22 5 - 25 mg/dL    Creatinine 0.65 0.60 - 1.30 mg/dL    Glucose 79 65 - 140 mg/dL    Calcium 8.0 (L) 8.4 - 10.2 mg/dL    Corrected Calcium 8.5 8.3 - 10.1 mg/dL    AST 13 13 - 39 U/L    ALT 10 7 - 52 U/L    Alkaline Phosphatase 94 34 - 104 U/L    Total Protein 6.1 (L) 6.4 - 8.4 g/dL    Albumin 3.4 (L) 3.5 - 5.0 g/dL    Total Bilirubin 0.47 0.20 - 1.00 mg/dL    eGFR 92 ml/min/1.73sq m   Basic metabolic panel   Result Value Ref Range    Sodium 134 (L) 135 - 147 mmol/L    Potassium 4.2 3.5 - 5.3 mmol/L    Chloride 104 96 - 108 mmol/L    CO2 20 (L) 21 - 32 mmol/L    ANION GAP 10 4 - 13 mmol/L    BUN 22 5 - 25 mg/dL    Creatinine 0.74 0.60 - 1.30 mg/dL    Glucose 97 65 - 140 mg/dL    Calcium 8.7 8.4 - 10.2 mg/dL    eGFR 87 ml/min/1.73sq m   CBC and differential   Result Value Ref Range    WBC 6.84 4.31 - 10.16 Thousand/uL    RBC 3.62 (L) 3.88 - 5.62 Million/uL    Hemoglobin 10.3 (L) 12.0 - 17.0 g/dL    Hematocrit 33.4 (L) 36.5 - 49.3 %    MCV 92 82 - 98 fL    MCH 28.5 26.8 - 34.3 pg    MCHC 30.8 (L) 31.4 - 37.4 g/dL    RDW 16.4 (H) 11.6 - 15.1 %    MPV 9.6 8.9 - 12.7 fL    Platelets 379 149 - 390 Thousands/uL    nRBC 0 /100 WBCs    Segmented % 76 (H) 43 - 75 %    Immature Grans % 1 0 - 2 %    Lymphocytes % 13 (L) 14 - 44 %    Monocytes % 7 4 - 12 %    Eosinophils Relative 3 0 - 6 %    Basophils Relative 0 0 - 1 %    Absolute Neutrophils 5.17 1.85 - 7.62 Thousands/µL    Absolute Immature Grans 0.07 0.00 - 0.20 Thousand/uL    Absolute Lymphocytes 0.89 0.60 - 4.47 Thousands/µL    Absolute Monocytes 0.50 0.17 - 1.22 Thousand/µL    Eosinophils Absolute 0.18 0.00 - 0.61 Thousand/µL    Basophils Absolute 0.03  0.00 - 0.10 Thousands/µL   Result Value Ref Range    Magnesium 1.9 1.9 - 2.7 mg/dL   Type and screen   Result Value Ref Range    ABO Grouping O     Rh Factor Positive     Antibody Screen Negative     Specimen Expiration Date 20250104    Type and screen   Result Value Ref Range    ABO Grouping O     Rh Factor Positive     Antibody Screen Negative     Specimen Expiration Date 20250110    Prepare Leukoreduced RBC: 2 Units, Leukoreduced   Result Value Ref Range    Unit Product Code [id number]     Unit Number [id number]     Unit ABO O     Unit RH POS     Crossmatch Compatible     Unit Dispense Status Return to Inv     Unit Product Volume 350 mL    Unit Product Code N6614U78     Unit Number [id number]     Unit ABO O     Unit RH POS     Crossmatch Compatible     Unit Dispense Status Return to Inv     Unit Product Volume 350 mL   Type and screen   Result Value Ref Range    ABO Grouping O     Rh Factor Positive     Antibody Screen Negative     Specimen Expiration Date 20250115    Prepare Leukoreduced RBC: 1 Units   Result Value Ref Range    Unit Product Code F5065A43     Unit Number [id number]     Unit ABO O     Unit RH POS     Crossmatch Compatible     Unit Dispense Status Presumed Trans     Unit Product Volume 350 mL   Tissue Exam   Result Value Ref Range    Case Report       Surgical Pathology Report                         Case: [id number]                                  Authorizing Provider:  Serina Cook DO      Collected:           01/08/2025 0918              Ordering Location:     [facility]      Received:            01/08/2025 0951                                     Hospital Operating Room                                                      Pathologist:           Cari Nelson MD                                                                    Specimen:    Leg, Right, RIGHT ABOVE KNEE AMPUTATION                                                    Final Diagnosis       A. Leg, right, right  "above knee amputation:  -   Right leg as grossly described (gross examination only per requisition).     Interpretation performed at [facility] [place] [id number]          Additional Information       All reported additional testing was performed with appropriately reactive controls.  These tests were developed and their performance characteristics determined by [facility] or appropriate performing facility, though some tests may be performed on tissues which have not been validated for performance characteristics (such as staining performed on alcohol exposed cell blocks and decalcified tissues).  Results should be interpreted with caution and in the context of the patients’ clinical condition. These tests may not be cleared or approved by the U.S. Food and Drug Administration, though the FDA has determined that such clearance or approval is not necessary. These tests are used for clinical purposes and they should not be regarded as investigational or for research. This laboratory has been approved by CLIA 88, designated as a high-complexity laboratory and is qualified to perform these tests.  .      Gross Description       A.  The specimen is received fresh, labeled with the patient's name and hospital number, and is designated \" right leg above-the-knee amputation\".  The specimen consists of a right leg above-the-knee amputation with all 5 digits and nails present measuring 50.0 cm from the proximal bone margin to the tip of the heel, 40.7 cm from the skin and soft tissue margin to the tip of the heel and 25.0 cm from the tip of the heel to the tip of the first digit.  The dorsal aspect of the foot exhibits a blackened and gray area where the skin is slopping of the specimens involving digits 1-4 measuring 10.0 x 8.0 cm which is located 47 cm from the skin and soft tissue margin and 55.7 cm from the proximal bone margin.  The majority of the foot exhibits a " red-purple discoloration.  Also noted on the medial aspect of the shin are multiple circular hemorrhagic areas ranging from less than 0.1 to 1.5 cm in greatest dimension.  Also noted is the vascular at the amputation site appears partially calcified.  Photographs are taken.  Gross examination only. No sections submitted.    Kasi     Fingerstick Glucose (POCT)   Result Value Ref Range    POC Glucose 91 65 - 140 mg/dl   Fingerstick Glucose (POCT)   Result Value Ref Range    POC Glucose 113 65 - 140 mg/dl     *Note: Due to a large number of results and/or encounters for the requested time period, some results have not been displayed. A complete set of results can be found in Results Review.             Administrative Statements   Encounter provider Lisa Barnard PA-C    The Patient is located at Home and in the following state in which I hold an active license [place].    The patient was identified by name and date of birth. Royce Rene was informed that this is a telemedicine visit and that the visit is being conducted through the Epic Embedded platform. He agrees to proceed..  My office door was closed. No one else was in the room.  He acknowledged consent and understanding of privacy and security of the video platform. The patient has agreed to participate and understands they can discontinue the visit at any time.    I have spent a total time of 25 minutes in caring for this patient on the day of the visit/encounter including Diagnostic results, Risks and benefits of tx options, Instructions for management, Patient and family education, Impressions, Counseling / Coordination of care, Documenting in the medical record, Reviewing / ordering tests, medicine, procedures  , and Obtaining or reviewing history  .

## 2025-02-04 NOTE — TELEPHONE ENCOUNTER
Lvm w/ pt's nursing home for pt's next f/u appt w/ Dr. Hernandez. Left hope line # to confirm or rs

## 2025-02-04 NOTE — ASSESSMENT & PLAN NOTE
Admitted 12/26/24 - 1/1/25 at [facility].    Presenting Sx were, nausea, vomiting, diarrhea, poor intake.  C. difficile and enteric panel were negative.  Had right ankle pain.  He had ulcer of his left foot.   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  Admitted through 1/24/25.  Fortunately, he had no revascularization options and underwent right AKA 1/8/2025.  Time of discharge it was noted that he was tolerating p.o., nausea and diarrhea resolved.    He was discharged to [facility].  Nurse, Vanessa.    P [phone]   F [phone].    Patient states overall, he is doing well.  He does have some phantom limb pain.    Either patient nor nurse were aware at this time if long-term skilled nursing will be needed.

## 2025-02-04 NOTE — ASSESSMENT & PLAN NOTE
12/28/24 iron saturation 10%; ferritin > 1000     1/12/25 transfusion 1 U prbc for hgb 7 range    Hgb 10.3 1/23/25  Hgb 10.3 1/30/25  (inflammation)    Will continue to observe.   Orders:  •  Iron Panel (Includes Ferritin, Iron Sat%, Iron, and TIBC); Future

## 2025-02-04 NOTE — TELEPHONE ENCOUNTER
Vanessa from patient's rehab center requesting a phone call first at [phone], ask for Vanessa, so she can tell Royce to answer his cell phone for the telephone call visit with Lisa Barnard.

## 2025-02-05 NOTE — TELEPHONE ENCOUNTER
Attempted to contact Vanessa, was told she was not available. Tentatively marked 2/13 to be cancelled. Also informed the facility that the orders we have are digital, not faxable orders.

## 2025-02-07 DIAGNOSIS — E79.0 HYPERURICEMIA: ICD-10-CM

## 2025-02-07 DIAGNOSIS — I50.22 CHRONIC HFREF (HEART FAILURE WITH REDUCED EJECTION FRACTION) (HCC): ICD-10-CM

## 2025-02-07 DIAGNOSIS — R33.9 URINARY RETENTION: ICD-10-CM

## 2025-02-08 RX ORDER — ALLOPURINOL 100 MG/1
100 TABLET ORAL DAILY
Qty: 30 TABLET | Refills: 0 | Status: SHIPPED | OUTPATIENT
Start: 2025-02-08

## 2025-02-08 RX ORDER — FINASTERIDE 5 MG/1
5 TABLET, FILM COATED ORAL DAILY
Qty: 30 TABLET | Refills: 0 | Status: SHIPPED | OUTPATIENT
Start: 2025-02-08

## 2025-02-08 RX ORDER — TAMSULOSIN HYDROCHLORIDE 0.4 MG/1
0.4 CAPSULE ORAL
Qty: 30 CAPSULE | Refills: 0 | Status: SHIPPED | OUTPATIENT
Start: 2025-02-08

## 2025-02-10 RX ORDER — METOPROLOL SUCCINATE 25 MG/1
25 TABLET, EXTENDED RELEASE ORAL DAILY
Qty: 90 TABLET | Refills: 1 | Status: SHIPPED | OUTPATIENT
Start: 2025-02-10

## 2025-02-10 RX ORDER — LOSARTAN POTASSIUM 25 MG/1
12.5 TABLET ORAL DAILY
Qty: 45 TABLET | Refills: 1 | Status: SHIPPED | OUTPATIENT
Start: 2025-02-10

## 2025-02-11 DIAGNOSIS — I73.9 PAD (PERIPHERAL ARTERY DISEASE) (HCC): ICD-10-CM

## 2025-02-11 RX ORDER — GABAPENTIN 300 MG/1
CAPSULE ORAL
Qty: 120 CAPSULE | Refills: 11 | Status: SHIPPED | OUTPATIENT
Start: 2025-02-11

## 2025-02-12 ENCOUNTER — OFFICE VISIT (OUTPATIENT)
Dept: VASCULAR SURGERY | Facility: CLINIC | Age: 80
End: 2025-02-12

## 2025-02-12 VITALS
HEIGHT: 68 IN | RESPIRATION RATE: 16 BRPM | OXYGEN SATURATION: 99 % | BODY MASS INDEX: 28.96 KG/M2 | DIASTOLIC BLOOD PRESSURE: 76 MMHG | HEART RATE: 98 BPM | SYSTOLIC BLOOD PRESSURE: 116 MMHG

## 2025-02-12 DIAGNOSIS — L97.514 DIABETIC ULCER OF TOE OF RIGHT FOOT ASSOCIATED WITH TYPE 2 DIABETES MELLITUS, WITH NECROSIS OF BONE (HCC): ICD-10-CM

## 2025-02-12 DIAGNOSIS — L97.521 ULCER OF LEFT FOOT, LIMITED TO BREAKDOWN OF SKIN (HCC): ICD-10-CM

## 2025-02-12 DIAGNOSIS — E66.01 MORBID (SEVERE) OBESITY DUE TO EXCESS CALORIES (HCC): ICD-10-CM

## 2025-02-12 DIAGNOSIS — I73.9 PAD (PERIPHERAL ARTERY DISEASE) (HCC): Primary | ICD-10-CM

## 2025-02-12 DIAGNOSIS — E11.621 DIABETIC ULCER OF TOE OF RIGHT FOOT ASSOCIATED WITH TYPE 2 DIABETES MELLITUS, WITH NECROSIS OF BONE (HCC): ICD-10-CM

## 2025-02-12 PROCEDURE — 99024 POSTOP FOLLOW-UP VISIT: CPT | Performed by: NURSE PRACTITIONER

## 2025-02-12 NOTE — PROGRESS NOTES
Name: Royce Rene      : 1945      MRN: [id number]  Encounter Provider: OMAIRA Wolf  Encounter Date: 2025   Encounter department: [facility] [place]    79-year-old male with hx of met prostate CA on treatment followed by oncology and palliative care, hypertension, hyperlipidemia, vascular dementia, CAD s/p cor stent, s/p TAVR, HFrEF, BiV ICD is now s/p R AKA 25 with  s/p LLE a-gram, returns to the office for post-op appointment.   Assessment & Plan  PAD (peripheral artery disease) (HCC)  Presents to the office from [facility] in wheelchair S/P R AKA 25 with .     Pt is s/p 25  A-gram for LLE done with IR Intravascular lithotripsy of the left CFA and ostial left SFA. Intravascular lithotripsy of the left TP and proximal PTA    -Denies true claudication, denies true rest pain, left foot 2-5th toes dry gangrenous. See clinical photos.   -No malodor, denies fever/ chills.   -doppler L PT/AT signals  -R AKA site is clean and dry with small area of scabbing in the middle. All staples removed in the office today, pt tolerated. Discussed he is not ready for prosthetic, will evaluate again in 3 weeks.     - Discussed with , pt is complex/ high risk due to metastatic prostate cancer receiving Lupron every 3 months hx of palliative radiation. Recommending updated surveillance with non-invasive imagining continued medical management.  Will review LEAD with  to discuss if further intervention is recommended. Referral provided for podiatry. Pt verbalized understanding.    Recommendations:  -Return to the office in 2-3 weeks for R AKA incision check. Continue to wash incision site daily with soap and water, pat dry and cover with dressing.   -Follow up with podiatry  -Complete LEAD due now, post a-gram and return to the office for review.  -Call the office with any new or worsening leg pain, discoloration, swelling, new wounds/ tissue  loss.  -Continue to take aspirin 81 mg daily.  -Continue to take statin medication daily as per PCP.  -Do daily foot checks, food protection, wear well fitted shoes.  -Increase daily walking for 20-30 min everyday.  -Discussed with  recommending repeat LEAD due now and return for review, f/u with Podiatry.   -CC to       Orders:    VAS ARTERIAL DUPLEX-LOWER LIMB UNILATERAL; Future    Ulcer of left foot, limited to breakdown of skin (HCC)    Orders:    Ambulatory Referral to Podiatry; Future        History of Present Illness   HPI  Royce Rene is a 79 y.o. male who presents hx of  met prostate CA on treatment followed by oncology and palliative care, hypertension, hyperlipidemia, vascular dementia, CAD s/p cor stent, s/p TAVR, HFrEF, BiV ICD is now s/p R AKA 1/8/25 with  pt returns to the office for post-op appoitment.     Pt presents to the office from nursing facility in a wheelchair. Reports he does utilize his LLE to stand and pivot.   Reports he fell out of bed 2x since his amputation, reports he did not hit his stump site.   States he does have   He is taking ASA, plavix and atorvastatin.     History obtained from: patient    Review of Systems   Musculoskeletal:  Positive for gait problem.   Skin:  Negative for color change.     Current Outpatient Medications on File Prior to Visit   Medication Sig Dispense Refill    Accu-Chek FastClix Lancets MISC       acetaminophen (TYLENOL) 325 mg tablet Take 3 tablets (975 mg total) by mouth every 8 (eight) hours      Alcohol Swabs (Alcohol Pads) 70 % PADS USE 2-3 TIMES AS DIRECTED.      allopurinol (ZYLOPRIM) 100 mg tablet TAKE 1 TABLET (100 MG TOTAL) BY MOUTH DAILY 30 tablet 0    aspirin 81 mg chewable tablet Chew 1 tablet (81 mg total) daily 90 tablet 1    atorvastatin (LIPITOR) 80 mg tablet Take 1 tablet (80 mg total) by mouth daily with dinner 90 tablet 1    bismuth subsalicylate (PEPTO BISMOL) 262 MG chewable tablet Chew 524 mg As needed       Blood Glucose Monitoring Suppl (Accu-Chek Guide Me) w/Device KIT USE AS DIRECTED 1 kit 1    clopidogrel (PLAVIX) 75 mg tablet Take 1 tablet (75 mg total) by mouth daily 90 tablet 1    Diclofenac Sodium (VOLTAREN) 1 % Apply 2 g topically 4 (four) times a day      Empagliflozin (Jardiance) 10 MG TABS tablet TAKE 1 TABLET (10 MG TOTAL) BY MOUTH EVERY MORNING 30 tablet 5    ezetimibe (ZETIA) 10 mg tablet Take 1 tablet (10 mg total) by mouth daily 90 tablet 1    famotidine (PEPCID) 20 mg tablet Take 1 tablet (20 mg total) by mouth daily 30 tablet 2    ferrous sulfate 325 (65 Fe) mg tablet Take 1 tablet (325 mg total) by mouth daily 90 tablet 1    finasteride (PROSCAR) 5 mg tablet TAKE 1 TABLET (5 MG TOTAL) BY MOUTH DAILY 30 tablet 0    furosemide (LASIX) 20 mg tablet TAKE 2 TABLETS (40 MG TOTAL) BY MOUTH DAILY 60 tablet 5    gabapentin (NEURONTIN) 300 mg capsule TAKE 2 CAPSULES (600 MG TOTAL) BY MOUTH 2 (TWO) TIMES A DAY TO REDUCE NERVE PAIN IN FEET. 120 capsule 11    glucose blood (Accu-Chek Guide) test strip TEST 2-3 TIMES AS DIRECTED 100 each 5    guaiFENesin (MUCINEX) 600 mg 12 hr tablet Take 1 tablet (600 mg total) by mouth every 12 (twelve) hours      Lancet Devices (Lancing Device) MISC  each 2    loperamide (IMODIUM) 2 mg capsule Take 1 capsule (2 mg total) by mouth 4 (four) times a day as needed for diarrhea 30 capsule 2    losartan (COZAAR) 25 mg tablet TAKE 0.5 TABLETS (12.5 MG TOTAL) BY MOUTH DAILY 45 tablet 1    methocarbamol (ROBAXIN) 500 mg tablet TAKE 1 TABLET (500 MG TOTAL) BY MOUTH 3 (THREE) TIMES A DAY 90 tablet 0    metoprolol succinate (TOPROL-XL) 25 mg 24 hr tablet TAKE ONE (1) TABLET BY MOUTH DAILY 90 tablet 1    ondansetron (ZOFRAN) 4 mg tablet Take 1 tablet (4 mg total) by mouth every 8 (eight) hours as needed for nausea or vomiting 20 tablet 1    oxyCODONE (ROXICODONE) 5 immediate release tablet Take 1 tablet (5 mg total) by mouth every 4 (four) hours as needed for moderate pain Max  "Daily Amount: 30 mg 15 tablet     pantoprazole (PROTONIX) 40 mg tablet Take 1 tablet (40 mg total) by mouth 2 (two) times a day before meals      pentoxifylline (TRENtal) 400 mg ER tablet Take 1 tablet (400 mg total) by mouth 3 (three) times a day with meals 90 tablet 0    potassium chloride (Klor-Con M20) 20 mEq tablet TAKE 1 TABLET (20 MEQ TOTAL) BY MOUTH DAILY 30 tablet 5    predniSONE 5 mg tablet TAKE 1 TABLET (5 MG TOTAL) BY MOUTH 2 (TWO) TIMES A DAY WITH MEALS 60 tablet 5    sucralfate (CARAFATE) 1 g tablet Take 1 tablet (1 g total) by mouth 4 (four) times a day (before meals and at bedtime)      tamsulosin (FLOMAX) 0.4 mg TAKE 1 CAPSULE (0.4 MG TOTAL) BY MOUTH DAILY WITH DINNER 30 capsule 0    venlafaxine (EFFEXOR) 25 mg tablet Take 1 tablet (25 mg total) by mouth in the morning       No current facility-administered medications on file prior to visit.         Objective   /76 (BP Location: Right arm, Patient Position: Sitting)   Pulse 98   Resp 16   Ht 5' 8\" (1.727 m)   SpO2 99%   BMI 28.96 kg/m²      Physical Exam  Vitals reviewed.   Constitutional:       General: He is not in acute distress.     Appearance: Normal appearance. He is obese. He is not ill-appearing.   HENT:      Head: Normocephalic and atraumatic.   Cardiovascular:      Rate and Rhythm: Normal rate and regular rhythm.      Pulses:           Radial pulses are 2+ on the right side and 2+ on the left side.         Right dorsalis pedis pulse not accessible.        Posterior tibial pulses are detected w/ Doppler on the left side. Right posterior tibial pulse not accessible.      Heart sounds: Murmur heard.      Comments: L AT and Peroneal doppler signals appreciated  Pulmonary:      Effort: Pulmonary effort is normal. No respiratory distress.      Breath sounds: Normal breath sounds.   Musculoskeletal:      Left lower leg: Edema (trace) present.      Left foot: Deformity present.      Right Lower Extremity: Right leg is amputated above " knee.   Feet:      Left foot:      Skin integrity: Dry skin present.   Skin:     General: Skin is warm and dry.   Neurological:      General: No focal deficit present.      Mental Status: He is alert and oriented to person, place, and time.      Sensory: Sensory deficit present.      Gait: Gait abnormal (wheelchair).   Psychiatric:         Mood and Affect: Mood normal.         Behavior: Behavior normal.                       Administrative Statements   I have spent a total time of 25 minutes in caring for this patient on the day of the visit/encounter including Diagnostic results, Risks and benefits of tx options, Instructions for management, Patient and family education, Importance of tx compliance, Risk factor reductions, Documenting in the medical record, Reviewing / ordering tests, medicine, procedures  , and Obtaining or reviewing history  .

## 2025-02-12 NOTE — PATIENT INSTRUCTIONS
"-Please continue to wash R AKA site daily with soap and water, pat dry, Place tubi- dressing over top. Encouraged to wear tubigrip while in wheelchair with legs dependant for R AKA swelling. Elevated 2-3 times a day.     -Return to the vascular office in 2-3 weeks for R AKA incision check, the AKA site is not healed enough to start prosthetic yet, will evaluate at next visit.    -Complete lower extremity ultrasound 3 months post a-gram (Due in April) and return to the office for review.     -Pt requires f/u with podiatry, please schedule as he states he does not have access to a phone currently. Per podiatry's last note they recommended local wound care \"dressed with betadine paint, Maxorb between the toes and DSD.\" Please continue.    -Call the office with new or worsening symptoms, increased pain or any R AKA incision site concerns.     -Continue with ASA, plavix and atorvastatin daily.    -For all post operative visits within 90 days of surgery are no charge by our office.  "

## 2025-02-12 NOTE — ASSESSMENT & PLAN NOTE
Presents to the office from [facility] in wheelchair S/P R AKA 1/8/25 with .     Pt is s/p 1/7/25  A-gram for LLE done with IR Intravascular lithotripsy of the left CFA and ostial left SFA. Intravascular lithotripsy of the left TP and proximal PTA    -Denies true claudication, denies true rest pain, left foot 2-5th toes dry gangrenous. See clinical photos.   -No malodor, denies fever/ chills.   -doppler L PT/AT signals  -R AKA site is clean and dry with small area of scabbing in the middle. All staples removed in the office today, pt tolerated. Discussed he is not ready for prosthetic, will evaluate again in 3 weeks.     - Discussed with , pt is complex/ high risk due to metastatic prostate cancer receiving Lupron every 3 months hx of palliative radiation. Recommending updated surveillance with non-invasive imagining continued medical management.  Will review LEAD with  to discuss if further intervention is recommended. Referral provided for podiatry. Pt verbalized understanding.    Recommendations:  -Return to the office in 2-3 weeks for R AKA incision check. Continue to wash incision site daily with soap and water, pat dry and cover with dressing.   -Follow up with podiatry  -Complete LEAD due now, post a-gram and return to the office for review.  -Call the office with any new or worsening leg pain, discoloration, swelling, new wounds/ tissue loss.  -Continue to take aspirin 81 mg daily.  -Continue to take statin medication daily as per PCP.  -Do daily foot checks, food protection, wear well fitted shoes.  -Increase daily walking for 20-30 min everyday.  -Discussed with  recommending repeat LEAD due now and return for review, f/u with Podiatry.   -CC to       Orders:    VAS ARTERIAL DUPLEX-LOWER LIMB UNILATERAL; Future

## 2025-02-13 ENCOUNTER — HOSPITAL ENCOUNTER (OUTPATIENT)
Dept: INFUSION CENTER | Facility: CLINIC | Age: 80
Discharge: HOME | End: 2025-02-13

## 2025-02-13 NOTE — ASSESSMENT & PLAN NOTE
OPERATIVE REPORT     Name: Elvi Mckeon    MRN: [id number]    CSN: [id number]    Procedure date: 2/13/2025    PRE-OP DIAGNOSIS: Unwanted fertility, IUD in place    POST-OPERATIVE DIAGNOSIS: Same    OPERATION: IUD removal and laparoscopic bilateral salpingectomy    ATTENDING SURGEON: Shaila Mccabe MD    ASSISTANT(S):   Circulator: Bee Myers RN  Scrub Person: Ysabel Funes  Assist: Aixa Fu    ANESTHESIA: General ET and local    URINE OUTPUT: 50 ML.    ESTIMATED BLOOD LOSS: 2 ML.    SPECIMENS: left and right fallopian tubes, IUD was discarded    ANTIBIOTICS: none    FINDINGS:   EUA - 6cm mobile uterus, no adnexal masses  LSC - 6cm uterus without myomas, normal appearing bilateral ovaries, normal appearing fallopian tubes bilaterally, no adhesive disease or endometriosis, normal appendix and intraabdominal anatomy.    DESCRIPTION OF PROCEDURE:   The patient was informed of the risks, benefits, and alternatives of the above-mentioned procedures and the consent was signed and witnessed. The patient was taken to the operating room and placed in the supine position. General endotracheal anesthesia was achieved without difficulty. The patient was then placed in the dorsal lithotomy position using Johnny stirrups. All pressure points were avoided or padded appropriately and a Jamila Hugger  was placed. Both arms were tucked in anatomical position at the patient s side. The patient was then examined, prepped and draped in the usual sterile fashion. Exam under anesthesia as noted above. A shrestha catheter was introduced into the bladder. A speculum was placed into the vagina and the anterior lip of the cervix grasped with a single tooth tenaculum. The IUD strings were grasped and was removed from her uterus in its entirety.  A Hulka was placed into the uterus for manipulation. The speculum and tenaculum were removed. Gloves were changed.     On the abdomen, a 5mm vertical infraumbilical incision was made. Utilizing a  Pic under media.  Low suspicion for active infection  Per podiatry, no surgical plans   5mm bladeless optiview trocar, the laparoscopic camera was introduced into the peritoneal cavity while tenting the abdominal wall. Pneumoperitoneum was insufflated without difficulty. The organs immediately below the site of entry was inspected and intact. The patient was placed into trendelenburg and the remaining ports were placed under direct visualization - one 5mm left lower quadrant port and one 5mm ride sided port.The pelvis and upper abdomen were explored with findings as noted above.     The left fallopian tube was tented up and the mesosalpinx was coagulated and cut using Ligasure. At the cornua, the tube was coagulated and transected, and removed via the trocar. The same procedure was performed on the right. Hemostasis was confirmed. The ports were removed under direct laparoscopic guidance, and the pneumoperitoneum was released. The skin was closed with 4-0 Monocryl and 30 cc of 0.25% Marcaine was infiltrated. Adhesive bandages were placed over skin incisions. The patient was returned to the supine position.     The hulka was removed from the uterus. The tenaculum was removed and puncture sites were noted to be hemostatic. All instruments were removed from the vagina. The shrestha catheter was then removed.     All I&O were noted, lap and instrument counts were correct times two at the completion of the procedure. The patient was then awakened from her anesthetic, extubated without difficulty and taken to the Post Anesthesia Care Unit in stable condition. Procedure overall uncomplicated.     Shaila Mccabe MD

## 2025-02-24 ENCOUNTER — PATIENT OUTREACH (OUTPATIENT)
Dept: CASE MANAGEMENT | Facility: OTHER | Age: 80
End: 2025-02-24

## 2025-02-24 ENCOUNTER — HOSPITAL ENCOUNTER (OUTPATIENT)
Dept: NON INVASIVE DIAGNOSTICS | Facility: CLINIC | Age: 80
Discharge: HOME | End: 2025-02-24
Payer: MEDICARE

## 2025-02-24 DIAGNOSIS — I73.9 PAD (PERIPHERAL ARTERY DISEASE) (HCC): ICD-10-CM

## 2025-02-24 PROCEDURE — 93926 LOWER EXTREMITY STUDY: CPT | Performed by: STUDENT IN AN ORGANIZED HEALTH CARE EDUCATION/TRAINING PROGRAM

## 2025-02-24 PROCEDURE — 93922 UPR/L XTREMITY ART 2 LEVELS: CPT | Performed by: STUDENT IN AN ORGANIZED HEALTH CARE EDUCATION/TRAINING PROGRAM

## 2025-02-24 PROCEDURE — 93926 LOWER EXTREMITY STUDY: CPT

## 2025-02-24 NOTE — PROGRESS NOTES
OSW performed chart review. Pt has not contacted this writer with any SW needs, therefore the referral will be closed. This writer will be available if any needs present in the future.

## 2025-02-26 DIAGNOSIS — R33.9 URINARY RETENTION: ICD-10-CM

## 2025-02-26 DIAGNOSIS — E78.2 MIXED HYPERLIPIDEMIA: ICD-10-CM

## 2025-02-26 DIAGNOSIS — E79.0 HYPERURICEMIA: ICD-10-CM

## 2025-02-27 RX ORDER — ALLOPURINOL 100 MG/1
100 TABLET ORAL DAILY
Qty: 90 TABLET | Refills: 1 | Status: SHIPPED | OUTPATIENT
Start: 2025-02-27

## 2025-02-27 RX ORDER — EZETIMIBE 10 MG/1
10 TABLET ORAL DAILY
Qty: 90 TABLET | Refills: 1 | Status: SHIPPED | OUTPATIENT
Start: 2025-02-27 | End: 2025-08-26

## 2025-02-27 RX ORDER — TAMSULOSIN HYDROCHLORIDE 0.4 MG/1
0.4 CAPSULE ORAL
Qty: 90 CAPSULE | Refills: 1 | Status: SHIPPED | OUTPATIENT
Start: 2025-02-27

## 2025-02-27 RX ORDER — FINASTERIDE 5 MG/1
5 TABLET, FILM COATED ORAL DAILY
Qty: 90 TABLET | Refills: 1 | Status: SHIPPED | OUTPATIENT
Start: 2025-02-27

## 2025-03-05 ENCOUNTER — TELEPHONE (OUTPATIENT)
Age: 80
End: 2025-03-05

## 2025-03-05 ENCOUNTER — OFFICE VISIT (OUTPATIENT)
Dept: VASCULAR SURGERY | Facility: CLINIC | Age: 80
End: 2025-03-05

## 2025-03-05 VITALS
DIASTOLIC BLOOD PRESSURE: 72 MMHG | RESPIRATION RATE: 16 BRPM | SYSTOLIC BLOOD PRESSURE: 118 MMHG | OXYGEN SATURATION: 95 % | HEART RATE: 86 BPM

## 2025-03-05 DIAGNOSIS — Z89.611 S/P AKA (ABOVE KNEE AMPUTATION) UNILATERAL, RIGHT (HCC): Primary | ICD-10-CM

## 2025-03-05 DIAGNOSIS — I73.9 PAD (PERIPHERAL ARTERY DISEASE) (HCC): ICD-10-CM

## 2025-03-05 DIAGNOSIS — L97.521 ULCER OF LEFT FOOT, LIMITED TO BREAKDOWN OF SKIN (HCC): ICD-10-CM

## 2025-03-05 DIAGNOSIS — I96 DRY GANGRENE (HCC): ICD-10-CM

## 2025-03-05 PROCEDURE — 99024 POSTOP FOLLOW-UP VISIT: CPT | Performed by: NURSE PRACTITIONER

## 2025-03-05 NOTE — ASSESSMENT & PLAN NOTE
79-year-old male with HTN, HLD, vascular dementia, CAD s/p coronary stent, s/p TAVR, heart failure, BiV ICD, metastatic prostate cancer with palliative radiation,s/p R AKA 1/8/2025 (Joey) 2/2 wet gangrene, and left foot dry gangrene s/p LLE Agram, shockwave CFA, SFA, TPT, PT 1/7/2025 (IR) presents for R AKA postop follow-up and wound check.    -R AKA incision closed.  No dehiscence, erythema, or drainage.  Small central dry scab approximately 1.5 cm x 1 cm.  (Clinical picture below)  -Patient endorses phantom pain    Plan:  -Continue to monitor right AKA incision.  Cleanse daily, soap and water, paint scabbed area with Betadine  -Return with any questions, concerns, or changes to incision  -May begin limb  when there is no longer scabbing to incision.    Orders:    Limb

## 2025-03-05 NOTE — ASSESSMENT & PLAN NOTE
Left foot dry gangrene 2-5th digits  Left lateral foot wound, dry gangrene    -Dry, no drainage or malodor.  Patient denies fever or chills  -Left PT signal, weak AT signal    -Briefly d/w Dr Harper Patient is optimized from a vascular standpoint, no further options for revascularization    LEAD:  2/24/25  LEFT LOWER LIMB:  Diffuse disease noted throughout the common femoral, superficial femoral,  proximal profunda femoris, popliteal and tibioperoneal trunk arteries, with no significant stenosis. S/P litotripsy The posterior tibial artery is patent.  S/P  litotripsy  KARLI NC/77/14      Plan:  -ASAP referral to podiatry for further management  - [facility] 77.  ? Heal TMA  -Continue daily local wound care, paint with Betadine  -Need to monitor for changes, erythema, drainage, malodor, fever or chills    Orders:    Ambulatory Referral to Podiatry; Future    VAS ARTERIAL DUPLEX-LOWER LIMB UNILATERAL; Future

## 2025-03-05 NOTE — PATIENT INSTRUCTIONS
R AKA incision care: Continue to monitor, wash daily with soap and water, pat dry.  Paint dry scabbed area with Betadine daily.  Monitor for erythema, drainage, breakdown, fever or chills.    L Foot dry gangrene/wounds: Paint daily with Betadine    ASAP referral to podiatry for further management    LEAD in 3 months with return OV    Continue Aspirin/ Plavix and statin

## 2025-03-05 NOTE — ASSESSMENT & PLAN NOTE
See above dry gangrene     R- s/p AKA  L- s/p LLE Agram, shockwave CFA, SFA, TPT, PT 1/7/2025 (IR)  Orders:    Ambulatory Referral to Podiatry; Future    VAS ARTERIAL DUPLEX-LOWER LIMB UNILATERAL; Future

## 2025-03-05 NOTE — PROGRESS NOTES
Name: Royce Rene      : 1945      MRN: [id number]  Encounter Provider: OMAIRA Chambers  Encounter Date: 3/5/2025   Encounter department: [facility] [place]  :  Assessment & Plan  S/P AKA (above knee amputation) unilateral, right (HCC)  79-year-old male with HTN, HLD, vascular dementia, CAD s/p coronary stent, s/p TAVR, heart failure, BiV ICD, metastatic prostate cancer with palliative radiation,s/p R AKA 2025 (Joey) 2/2 wet gangrene, and left foot dry gangrene s/p LLE Agram, shockwave CFA, SFA, TPT, PT 2025 (IR) presents for R AKA postop follow-up and wound check.    -R AKA incision closed.  No dehiscence, erythema, or drainage.  Small central dry scab approximately 1.5 cm x 1 cm.  (Clinical picture below)  -Patient endorses phantom pain    Plan:  -Continue to monitor right AKA incision.  Cleanse daily, soap and water, paint scabbed area with Betadine  -Return with any questions, concerns, or changes to incision  -May begin limb  when there is no longer scabbing to incision.    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  Patient denies fever or chills  -Left PT signal, weak AT signal    -Briefly d/w Dr Harper Patient is optimized from a vascular standpoint, no further options for revascularization    LEAD:  25  LEFT LOWER LIMB:  Diffuse disease noted throughout the common femoral, superficial femoral,  proximal profunda femoris, popliteal and tibioperoneal trunk arteries, with no significant stenosis. S/P litotripsy The posterior tibial artery is patent.  S/P  litotripsy  KARLI       Plan:  -ASAP referral to podiatry for further management  - [facility] 77.  ? Heal TMA  -Continue daily local wound care, paint with Betadine  -Need to monitor for changes, erythema, drainage, malodor, fever or chills    Orders:    Ambulatory Referral to Podiatry;  Future    VAS ARTERIAL DUPLEX-LOWER LIMB UNILATERAL; Future    Ulcer of left foot, limited to breakdown of skin (Formerly [facility])    Orders:    Ambulatory Referral to Podiatry; Future    VAS ARTERIAL DUPLEX-LOWER LIMB UNILATERAL; Future    PAD (peripheral artery disease) (Formerly [facility])  See above dry gangrene     R- s/p AKA  L- s/p LLE Agram, shockwave CFA, SFA, TPT, PT 1/7/2025 (IR)  Orders:    Ambulatory Referral to Podiatry; Future    VAS ARTERIAL DUPLEX-LOWER LIMB UNILATERAL; Future        History of Present Illness   HPI  Royce Rene is a 79 y.o. male who presents from [facility] postop follow-up right AKA and left foot wound check.    History obtained from: patient    Review of Systems   Musculoskeletal:  Positive for gait problem.   Skin:  Positive for color change and wound.   Neurological:  Positive for numbness.     Medical History Reviewed by provider this encounter:     .  Past Medical History   Past Medical History:   Diagnosis Date    Cancer (Formerly [facility]) 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    Coronary artery disease 2001    S/p stenting to circumflex and RCA    HFrEF (heart failure with reduced ejection fraction) (Formerly [facility]) 06/14/2021    High cholesterol     Pacemaker     Prostate cancer (Formerly [facility])      Past Surgical History:   Procedure Laterality Date    AMPUTATION ABOVE KNEE (AKA) Right 1/8/2025    Procedure: RIGHT AMPUTATION ABOVE KNEE (AKA);  Surgeon: Serina Cook DO;  Location: [facility];  Service: Vascular    CARDIAC CATHETERIZATION      CARDIAC ELECTROPHYSIOLOGY PROCEDURE N/A 12/23/2021    Procedure: Implant ICD - bi ventricular;  Surgeon: Jonas Oviedo MD;  Location: [facility];  Service: Cardiology    COLONOSCOPY      IR LOWER EXTREMITY ANGIOGRAM  04/18/2023    IR LOWER EXTREMITY ANGIOGRAM  10/30/2024    IR LOWER EXTREMITY ANGIOGRAM  1/7/2025    MS TEAEC W/WO PATCH GRAFT COMMON FEMORAL Right 07/09/2021    Procedure: ENDARTERECTOMY ARTERIAL FEMORAL;  Surgeon: Serina Cook DO;  Location: [facility];   Service: Vascular     Family History   Problem Relation Age of Onset    Cancer Mother       reports that he quit smoking about 22 years ago. His smoking use included cigarettes. He started smoking about 63 years ago. He has been exposed to tobacco smoke. He has never used smokeless tobacco. He reports that he does not currently use alcohol. He reports that he does not currently use drugs.  Current Outpatient Medications   Medication Instructions    Accu-Chek FastClix Lancets MISC     acetaminophen (TYLENOL) 975 mg, Oral, Every 8 hours scheduled    Alcohol Swabs (Alcohol Pads) 70 % PADS USE 2-3 TIMES AS DIRECTED.    allopurinol (ZYLOPRIM) 100 mg, Oral, Daily    aspirin 81 mg, Oral, Daily    atorvastatin (LIPITOR) 80 mg, Oral, Daily with dinner    bismuth subsalicylate (PEPTO BISMOL) 524 mg, Oral, As needed    Blood Glucose Monitoring Suppl (Accu-Chek Guide Me) w/Device KIT USE AS DIRECTED    clopidogrel (PLAVIX) 75 mg, Oral, Daily    Diclofenac Sodium (VOLTAREN) 2 g, Topical, 4 times daily    ezetimibe (ZETIA) 10 mg, Oral, Daily    famotidine (PEPCID) 20 mg, Oral, Daily    ferrous sulfate 325 mg, Oral, Daily    finasteride (PROSCAR) 5 mg, Oral, Daily    furosemide (LASIX) 40 mg, Oral, Daily    gabapentin (NEURONTIN) 300 mg capsule TAKE 2 CAPSULES (600 MG TOTAL) BY MOUTH 2 (TWO) TIMES A DAY TO REDUCE NERVE PAIN IN FEET.    glucose blood (Accu-Chek Guide) test strip TEST 2-3 TIMES AS DIRECTED    guaiFENesin (MUCINEX) 600 mg, Oral, Every 12 hours scheduled    Jardiance 10 mg, Oral, Every morning    Lancet Devices (Lancing Device) MISC TID    loperamide (IMODIUM) 2 mg, Oral, 4 times daily PRN    losartan (COZAAR) 12.5 mg, Oral, Daily    methocarbamol (ROBAXIN) 500 mg, Oral, 3 times daily    metoprolol succinate (TOPROL-XL) 25 mg, Oral, Daily    ondansetron (ZOFRAN) 4 mg, Oral, Every 8 hours PRN    oxyCODONE (ROXICODONE) 5 mg, Oral, Every 4 hours PRN    pantoprazole (PROTONIX) 40 mg, Oral, 2 times daily before meals     pentoxifylline (TRENTAL) 400 mg, Oral, 3 times daily with meals    potassium chloride (Klor-Con M20) 20 mEq tablet 20 mEq, Oral, Daily    predniSONE 5 mg, Oral, 2 times daily with meals    sucralfate (CARAFATE) 1 g, Oral, 4 times daily (before meals and at bedtime)    tamsulosin (FLOMAX) 0.4 mg, Oral, Daily with dinner    venlafaxine (EFFEXOR) 25 mg, Oral, Daily   No Known Allergies   Current Outpatient Medications on File Prior to Visit   Medication Sig Dispense Refill    acetaminophen (TYLENOL) 325 mg tablet Take 3 tablets (975 mg total) by mouth every 8 (eight) hours      allopurinol (ZYLOPRIM) 100 mg tablet TAKE 1 TABLET (100 MG TOTAL) BY MOUTH DAILY 90 tablet 1    aspirin 81 mg chewable tablet Chew 1 tablet (81 mg total) daily 90 tablet 1    atorvastatin (LIPITOR) 80 mg tablet Take 1 tablet (80 mg total) by mouth daily with dinner 90 tablet 1    bismuth subsalicylate (PEPTO BISMOL) 262 MG chewable tablet Chew 524 mg As needed      clopidogrel (PLAVIX) 75 mg tablet Take 1 tablet (75 mg total) by mouth daily 90 tablet 1    Diclofenac Sodium (VOLTAREN) 1 % Apply 2 g topically 4 (four) times a day      Empagliflozin (Jardiance) 10 MG TABS tablet TAKE 1 TABLET (10 MG TOTAL) BY MOUTH EVERY MORNING 30 tablet 5    ezetimibe (ZETIA) 10 mg tablet TAKE 1 TABLET (10 MG TOTAL) BY MOUTH DAILY 90 tablet 1    famotidine (PEPCID) 20 mg tablet Take 1 tablet (20 mg total) by mouth daily 30 tablet 2    ferrous sulfate 325 (65 Fe) mg tablet Take 1 tablet (325 mg total) by mouth daily 90 tablet 1    finasteride (PROSCAR) 5 mg tablet TAKE 1 TABLET (5 MG TOTAL) BY MOUTH DAILY 90 tablet 1    furosemide (LASIX) 20 mg tablet TAKE 2 TABLETS (40 MG TOTAL) BY MOUTH DAILY 60 tablet 5    gabapentin (NEURONTIN) 300 mg capsule TAKE 2 CAPSULES (600 MG TOTAL) BY MOUTH 2 (TWO) TIMES A DAY TO REDUCE NERVE PAIN IN FEET. 120 capsule 11    guaiFENesin (MUCINEX) 600 mg 12 hr tablet Take 1 tablet (600 mg total) by mouth every 12 (twelve) hours       loperamide (IMODIUM) 2 mg capsule Take 1 capsule (2 mg total) by mouth 4 (four) times a day as needed for diarrhea 30 capsule 2    losartan (COZAAR) 25 mg tablet TAKE 0.5 TABLETS (12.5 MG TOTAL) BY MOUTH DAILY 45 tablet 1    methocarbamol (ROBAXIN) 500 mg tablet TAKE 1 TABLET (500 MG TOTAL) BY MOUTH 3 (THREE) TIMES A DAY 90 tablet 0    metoprolol succinate (TOPROL-XL) 25 mg 24 hr tablet TAKE ONE (1) TABLET BY MOUTH DAILY 90 tablet 1    ondansetron (ZOFRAN) 4 mg tablet Take 1 tablet (4 mg total) by mouth every 8 (eight) hours as needed for nausea or vomiting 20 tablet 1    pantoprazole (PROTONIX) 40 mg tablet Take 1 tablet (40 mg total) by mouth 2 (two) times a day before meals      pentoxifylline (TRENtal) 400 mg ER tablet Take 1 tablet (400 mg total) by mouth 3 (three) times a day with meals 90 tablet 0    potassium chloride (Klor-Con M20) 20 mEq tablet TAKE 1 TABLET (20 MEQ TOTAL) BY MOUTH DAILY 30 tablet 5    predniSONE 5 mg tablet TAKE 1 TABLET (5 MG TOTAL) BY MOUTH 2 (TWO) TIMES A DAY WITH MEALS 60 tablet 5    tamsulosin (FLOMAX) 0.4 mg TAKE 1 CAPSULE (0.4 MG TOTAL) BY MOUTH DAILY WITH DINNER 90 capsule 1    Accu-Chek FastClix Lancets MISC       Alcohol Swabs (Alcohol Pads) 70 % PADS USE 2-3 TIMES AS DIRECTED.      Blood Glucose Monitoring Suppl (Accu-Chek Guide Me) w/Device KIT USE AS DIRECTED 1 kit 1    glucose blood (Accu-Chek Guide) test strip TEST 2-3 TIMES AS DIRECTED 100 each 5    Lancet Devices (Lancing Device) MISC  each 2    oxyCODONE (ROXICODONE) 5 immediate release tablet Take 1 tablet (5 mg total) by mouth every 4 (four) hours as needed for moderate pain Max Daily Amount: 30 mg (Patient not taking: Reported on 3/5/2025) 15 tablet     sucralfate (CARAFATE) 1 g tablet Take 1 tablet (1 g total) by mouth 4 (four) times a day (before meals and at bedtime) (Patient not taking: Reported on 3/5/2025)      venlafaxine (EFFEXOR) 25 mg tablet Take 1 tablet (25 mg total) by mouth in the morning (Patient  not taking: Reported on 3/5/2025)       No current facility-administered medications on file prior to visit.      Social History     Tobacco Use    Smoking status: Former     Current packs/day: 0.00     Types: Cigarettes     Start date: 1962     Quit date: 2002     Years since quittin.6     Passive exposure: Past    Smokeless tobacco: Never   Vaping Use    Vaping status: Never Used   Substance and Sexual Activity    Alcohol use: Not Currently    Drug use: Not Currently    Sexual activity: Not on file        Objective   /72 (BP Location: Right arm, Patient Position: Sitting)   Pulse 86   Resp 16   SpO2 95%      Physical Exam  Constitutional:       General: He is not in acute distress.     Appearance: He is obese.   Cardiovascular:      Pulses:           Dorsalis pedis pulses are 0 on the left side.        Posterior tibial pulses are detected w/ Doppler on the left side.   Pulmonary:      Effort: Pulmonary effort is normal. No respiratory distress.   Musculoskeletal:      Left lower leg: No edema.      Comments: Wheelchair  R AKA   Skin:     General: Skin is warm.      Coloration: Skin is pale.      Comments: R AKA incision central scabbed area approximately 1.5 cm x 1 cm    Left foot 2 through fifth digits dry gangrene    Left lateral foot dry ulceration    No drainage or malodor   Neurological:      Mental Status: He is alert and oriented to person, place, and time.      Sensory: No sensory deficit.      Motor: No weakness.   Psychiatric:         Behavior: Behavior normal.             Left lateral foot    R AKA  Administrative Statements   I have spent a total time of 20 minutes in caring for this patient on the day of the visit/encounter including Prognosis, Instructions for management, Patient and family education, Impressions, Documenting in the medical record, Reviewing/placing orders in the medical record (including tests, medications, and/or procedures), Obtaining or reviewing history  ,  and Communicating with other healthcare professionals .

## 2025-03-05 NOTE — TELEPHONE ENCOUNTER
Caller: [facility]    Doctor and/or Office: Dr. Alvarez/[facility]    CB#: [phone]    Escalation: Appointment Patient has an ASAP referral in [facility]. Requesting [facility] office. Please return call to [facility]. Thank  you

## 2025-03-05 NOTE — ASSESSMENT & PLAN NOTE
Orders:    Ambulatory Referral to Podiatry; Future    VAS ARTERIAL DUPLEX-LOWER LIMB UNILATERAL; Future

## 2025-03-06 ENCOUNTER — TELEPHONE (OUTPATIENT)
Age: 80
End: 2025-03-06

## 2025-03-06 NOTE — TELEPHONE ENCOUNTER
Caller: [facility]EN    Doctor/Office:  POD    Call regarding :  APPOINTMENT     Call was transferred to: POD

## 2025-03-06 NOTE — TELEPHONE ENCOUNTER
I called rehab as well , I was transferred twice, then the third time I had to leave a voicemail,    I was going to offer what elmer had provided but unable to speak to someone.

## 2025-03-07 ENCOUNTER — RA CDI HCC (OUTPATIENT)
Dept: OTHER | Facility: HOSPITAL | Age: 80
End: 2025-03-07

## 2025-03-11 NOTE — TELEPHONE ENCOUNTER
I called Pt again today, they never picked up. I did tell the person I did talk to that I've been calling for days with no answer. The phone room also called. We are transferred all over then no one picks up.

## 2025-03-15 PROBLEM — K55.9 ISCHEMIC COLITIS (HCC): Status: ACTIVE | Noted: 2025-01-01

## 2025-03-15 NOTE — CODE DOCUMENTATION
Bicarb drip started at 100    76M w/ PMH HTN HLD presents as transfer from [facility] s/p cardiac arrest. Per reports patient had a witness fall and arrest at home. Pt was intubated  in the field. ENT consulted for oral bleeding noted upon suctioning. Oral packing reportedly removed overnight by RT in order to place bite block, however not currently in place. 12/20: On exam, blood suctioned from posterior pharynx, area of ecchymosis noted behind left tonsillar pillar, oropharynx re-packed with kerlix x1. No further bleeding   76M w/ PMH HTN HLD presents as transfer from [facility] s/p cardiac arrest. Per reports patient had a witness fall and arrest at home. Pt was intubated  in the field. ENT consulted for oral bleeding noted upon suctioning. Oral packing reportedly removed overnight by RT in order to place bite block, however not currently in place. 12/20: On exam, blood suctioned from posterior pharynx, area of ecchymosis noted behind left tonsillar pillar, oropharynx re-packed with kerlix x1. No further bleeding   76M w/ PMH HTN HLD presents as transfer from [facility] s/p cardiac arrest. Per reports patient had a witness fall and arrest at home. Pt was intubated  in the field. ENT consulted for oral bleeding noted upon suctioning. Oral packing reportedly removed overnight by RT in order to place bite block, however not currently in place. 12/20: On exam, blood suctioned from posterior pharynx, area of ecchymosis noted behind left tonsillar pillar, oropharynx re-packed with kerlix x1. Oral packing was removed today, no evidence of active bleeding, OG tube placed after failed placement of NGT. Recommend CXR for confirmation.   76M w/ PMH HTN HLD presents as transfer from [facility] s/p cardiac arrest. Per reports patient had a witness fall and arrest at home. Pt was intubated  in the field. ENT consulted for oral bleeding noted upon suctioning. Oral packing reportedly removed overnight by RT in order to place bite block, however not currently in place. 12/20: On exam, blood suctioned from posterior pharynx, area of ecchymosis noted behind left tonsillar pillar, oropharynx re-packed with kerlix x1. Oral packing was removed today, no evidence of active bleeding, OG tube placed after failed placement of NGT. Recommend CXR for confirmation.

## 2025-03-15 NOTE — ANESTHESIA PROCEDURE NOTES
Arterial Line Insertion    Performed by: Kade Charles CRNA  Authorized by: Elia Butts MD  Consent: The procedure was performed in an emergent situation.  Consent given by: patient  Patient identity confirmed: arm band  Indications: hemodynamic monitoring  Orientation:  Left  Location: radial artery  Post-procedure:  Post-procedure: dressing applied  Waveform: good waveform and waveform confirmed  Post-procedure CNS: normal and unchanged

## 2025-03-15 NOTE — ED PROVIDER NOTES
Time reflects when diagnosis was documented in both MDM as applicable and the Disposition within this note       Time User Action Codes Description Comment    3/15/2025  3:05 AM Noé Felder Add [R06.00] Dyspnea     3/15/2025  3:05 AM Noé Felder Add [R10.9] Abdominal pain     3/15/2025  3:05 AM Noé Felder Add [R19.7] Diarrhea     3/15/2025  3:05 AM Noé Felder Modify [R06.00] Dyspnea     3/15/2025  3:37 AM Noé Felder Add [I48.91] Atrial fibrillation with rapid ventricular response (HCC)     3/15/2025  3:37 AM Noé Felder Modify [I48.91] Atrial fibrillation with rapid ventricular response (HCC)     3/15/2025  4:20 AM Noé Felder Add [Z45.02] AICD discharge     3/15/2025  4:23 AM Noé Felder Add [E87.6] Hypokalemia     3/15/2025  4:23 AM Noé Felder Add [E83.42] Hypomagnesemia     3/15/2025  4:25 AM Noé Felder Add [E87.20] Lactic acidosis     3/15/2025  4:26 AM Noé Felder Add [R79.89] Elevated procalcitonin     3/15/2025  4:26 AM Noé Felder Add [R79.89] Elevated troponin     3/15/2025  4:34 AM Noé Felder Add [I26.99] Pulmonary emboli (HCC)     3/15/2025  4:41 AM Noé Felder Add [K55.9] Mesenteric ischemia (HCC)     3/15/2025  4:41 AM Noé Felder Add [K63.89] Pneumatosis intestinalis     3/15/2025  4:41 AM Noé Felder Modify [Z45.02] AICD discharge     3/15/2025  4:41 AM Noé Felder Modify [K55.9] Mesenteric ischemia (HCC)     3/15/2025  5:35 AM Noé Felder Add [R79.89] Elevated brain natriuretic peptide (BNP) level     3/15/2025  5:35 AM Noé Felder Add [R79.89] Elevated TSH           ED Disposition       ED Disposition   Transfer to Another Facility-In Network    Condition   --    Date/Time   Sat Mar 15, 2025  4:46 AM    Comment   Royce Rene should be transferred out to [facility](Dr. Keenan).               Assessment & Plan       Medical Decision Making  Patient is a 79-year-old male seen in the emergency department brought by EMS with concern for diarrhea,  dyspnea, abdominal pain.  Differential diagnosis includes ACS, PE, pneumothorax, pneumonia, diverticulitis, bowel obstruction, appendicitis, pancreatitis, mesenteric ischemia. Fingerstick blood glucose was elevated at 222. EKG was obtained and noted, which showed findings concerning for atrial fibrillation with rapid ventricular response.  Patient was treated with diltiazem for rate control.  Patient later had episode of V. tach with AICD fire.  Repeat EKG showed sinus tachycardia. Chest x-ray showed no infiltrate or pneumothorax.  COVID-19/influenza swab was obtained in the emergency department, and these tests were negative. Laboratory evaluation remarkable for elevated white blood cell count of 15.21, elevated troponin of 514, elevated lactic acid of 8.0, low sodium of 134, low potassium of 3.0, low CO2 of 14, elevated anion gap of 20, elevated creatinine of 1.49, elevated glucose of 231, low calcium of 7.7, low total protein of 5.5, low albumin of 2.9, low magnesium of 1.8, elevated PT of 17.2, elevated INR 1.37 elevated BNP of 677, urinalysis positive for 1+ leukocytes, trace protein, 3+ glucose, 1+ occult blood, field obscured unable to enumerate red blood cells, innumerable white blood cells, unable to enumerate epithelial cells and bacteria, yeast cells, clumped white blood cells.  Patient was resuscitated with IV fluids, and was treated with broad-spectrum antibiotics with concern for possible intra-abdominal infection. CTA chest(PE protocol) with abdomen/pelvis was obtained to evaluate for pulmonary embolism, bowel obstruction, diverticulitis, appendicitis, or other acute abnormality.  CT imaging showed findings concerning for mesenteric ischemia, right sided pulmonary emboli, pneumatosis intestinalis, portal venous gas. Plan to transfer patient for further evaluation and treatment.  Case was reviewed with PAC.  Case was reviewed with surgery(Dr. Jaquez), with recommendation for transfer.  Case was reviewed  with surgery at [facility](Dr. Keenan), with recommendation for priority 1 transfer to the OR at [facility]. Phone calls were placed to patient's son and niece, but there was no answer. Heparin infusion was started in the emergency department. Plan of care was reviewed with patient.    Critical Care Time Statement: Upon my evaluation, this patient had a high probability of imminent or life-threatening deterioration due to mesenteric ischemia, AICD fire/ventricular tachycardia/arrhythmia, which required my direct attention, intervention, and personal management.  I spent a total of 60 minutes directly providing critical care services, including interpretation of complex medical databases, evaluating for the presence of life-threatening injuries or illnesses, management of organ system failure(s) , complex medical decision making (to support/prevent further life-threatening deterioration)., and interpretation of hemodynamic data. This time is exclusive of procedures, teaching, treating other patients, family meetings, and any prior time recorded by providers other than myself.        Problems Addressed:  Atrial fibrillation with rapid ventricular response (HCC): acute illness or injury  Diarrhea: acute illness or injury  Dyspnea: acute illness or injury    Amount and/or Complexity of Data Reviewed  Labs: ordered. Decision-making details documented in ED Course.  Radiology: ordered and independent interpretation performed. Decision-making details documented in ED Course.  ECG/medicine tests: ordered and independent interpretation performed. Decision-making details documented in ED Course.    Risk  Prescription drug management.        ED Course as of 03/15/25 0547   Sat Mar 15, 2025   0503 Case reviewed with radiology, surgery, PAC,acute care surgery at [place].   0505 Patient to be transferred to [facility](Dr. Keenan) for further evaluation and treatment.   0530 CTA chest(PE study) with abdomen/pelvis-    IMPRESSION:     Abnormal  appearance of the bowel with pneumatosis intestinalis, extensive mesenteric venous gas and extensive portal venous gas within the liver, as described above. Please see discussion. Bowel ischemia should be considered. Urgent Surgical consultation   is recommended.     There are pulmonary emboli. Although the RV/LV ratio is less than 0.9, there is questionable leftward bowing of the interventricular septum. Findings concerning for developing pulmonary infarctions in the right middle and lower lobe of the lung.   Pulmonology consultation recommended.     The urinary bladder wall appears mildly thickened and there is slight urothelial enhancement. Correlation with urinalysis and urine culture and sensitivity is recommended.     Extensive osseous metastatic disease.     Atherosclerosis. Coronary artery disease.     Small amount of ascites.         Medications   sodium chloride 0.9 % bolus 500 mL (0 mL Intravenous Stopped 3/15/25 0438)   diltiazem (CARDIZEM) injection 20 mg (20 mg Intravenous Given 3/15/25 0343)   sodium chloride 0.9 % bolus 1,000 mL (0 mL Intravenous Stopped 3/15/25 0514)   iohexol (OMNIPAQUE) 350 MG/ML injection (SINGLE-DOSE) 100 mL (100 mL Intravenous Given 3/15/25 0413)   magnesium sulfate IVPB (premix) SOLN 1 g (0 g Intravenous Stopped 3/15/25 0529)   piperacillin-tazobactam (ZOSYN) IVPB 4.5 g (0 g Intravenous Stopped 3/15/25 0501)   fentaNYL injection 25 mcg (25 mcg Intravenous Given 3/15/25 0455)   heparin (porcine) injection 6,800 Units (6,800 Units Intravenous Given 3/15/25 0457)   fentaNYL injection 25 mcg (25 mcg Intravenous Given 3/15/25 0505)       ED Risk Strat Scores                            SBIRT 20yo+      Flowsheet Row Most Recent Value   Initial Alcohol Screen: US AUDIT-C     1. How often do you have a drink containing alcohol? 0 Filed at: 03/15/2025 0309   2. How many drinks containing alcohol do you have on a typical day you are drinking?  0 Filed at: 03/15/2025 0309   3a. Male  UNDER 65: How often do you have five or more drinks on one occasion? 0 Filed at: 03/15/2025 0309   3b. FEMALE Any Age, or MALE 65+: How often do you have 4 or more drinks on one occassion? 0 Filed at: 03/15/2025 030   Audit-C Score 0 Filed at: 03/15/2025 030   IVONNE: How many times in the past year have you...    Used an illegal drug or used a prescription medication for non-medical reasons? Never Filed at: 03/15/2025 030                            History of Present Illness       Chief Complaint   Patient presents with    Diarrhea    Shortness of Breath     Pt arrived via ems with c/o diarrhea and sob. 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    Coronary artery disease     S/p stenting to circumflex and RCA    HFrEF (heart failure with reduced ejection fraction) (Formerly [facility]) 2021    High cholesterol     Pacemaker     Prostate cancer (Formerly [facility])       Past Surgical History:   Procedure Laterality Date    AMPUTATION ABOVE KNEE (AKA) Right 2025    Procedure: RIGHT AMPUTATION ABOVE KNEE (AKA);  Surgeon: Serina Cook DO;  Location: [facility];  Service: Vascular    CARDIAC CATHETERIZATION      CARDIAC ELECTROPHYSIOLOGY PROCEDURE N/A 2021    Procedure: Implant ICD - bi ventricular;  Surgeon: Jonas Oviedo MD;  Location: [facility] CARD[facility]AC [facility];  Service: Cardiology    COLONOSCOPY      IR LOWER EXTREMITY ANGIOGRAM  2023    IR LOWER EXTREMITY ANGIOGRAM  10/30/2024    IR LOWER EXTREMITY ANGIOGRAM  2025    AR TEAEC W/WO PATCH GRAFT COMMON FEMORAL Right 2021    Procedure: ENDARTERECTOMY ARTERIAL FEMORAL;  Surgeon: Serina Cook DO;  Location: [facility];  Service: Vascular      Family History   Problem Relation Age of Onset    Cancer Mother       Social History     Tobacco Use    Smoking status: Former     Current packs/day: 0.00     Types: Cigarettes     Start date: 1962     Quit date: 2002     Years since quittin.7      Passive exposure: Past    Smokeless tobacco: Never   Vaping Use    Vaping status: Never Used   Substance Use Topics    Alcohol use: Not Currently    Drug use: Not Currently      E-Cigarette/Vaping    E-Cigarette Use Never User       E-Cigarette/Vaping Substances    Nicotine No     THC No     CBD No     Flavoring No     Other No     Unknown No       I have reviewed and agree with the history as documented.     Patient is a 79-year-old male seen in the emergency department brought by EMS from facility with concern for diarrhea, low blood pressure.  Patient notes some additional shortness of breath and abdominal pain.  Patient notes no definite clear exacerbating or alleviating factors for his symptoms.  Patient notes productive cough.  Patient notes no fever, chest pain, weakness, numbness, tingling.        Review of Systems   Constitutional:  Negative for chills and fever.   HENT:  Negative for ear pain and trouble swallowing.    Eyes:  Negative for pain and visual disturbance.   Respiratory:  Positive for cough and shortness of breath.    Cardiovascular:  Negative for chest pain and palpitations.   Gastrointestinal:  Positive for abdominal pain and diarrhea. Negative for vomiting.   Genitourinary:  Negative for decreased urine volume and difficulty urinating.   Musculoskeletal:  Negative for joint swelling and neck stiffness.   Skin:  Negative for color change and rash.   Neurological:  Negative for seizures and syncope.   Psychiatric/Behavioral:  Negative for agitation and decreased concentration.    All other systems reviewed and are negative.          Objective       ED Triage Vitals   Temperature Pulse Blood Pressure Respirations SpO2 Patient Position - Orthostatic VS   03/15/25 0300 03/15/25 0300 03/15/25 0300 03/15/25 0300 03/15/25 0300 03/15/25 0300   98.7 °F (37.1 °C) 79 (!) 89/65 (!) 37 (!) 76 % Lying      Temp Source Heart Rate Source BP Location FiO2 (%) Pain Score    03/15/25 0300 03/15/25 0300 03/15/25  0300 -- 03/15/25 0455    Oral Monitor Left arm  10 - Worst Possible Pain      Vitals      Date and Time Temp Pulse SpO2 Resp BP Pain Score FACES Pain Rating User   03/15/25 0503 -- 101 97 % -- 136/101 -- -- MO   03/15/25 0500 -- 102 99 % 35 136/101 -- -- DG   03/15/25 0455 -- -- -- -- -- 10 - Worst Possible Pain -- MO   03/15/25 0300 98.7 °F (37.1 °C) 79 76 % 37 89/65 -- -- LC            Physical Exam  Vitals and nursing note reviewed.   Constitutional:       Appearance: He is well-developed. He is ill-appearing.   HENT:      Head: Normocephalic and atraumatic.      Right Ear: External ear normal.      Left Ear: External ear normal.      Nose: Nose normal.      Mouth/Throat:      Pharynx: Oropharynx is clear.   Eyes:      General: No scleral icterus.     Conjunctiva/sclera: Conjunctivae normal.   Cardiovascular:      Rate and Rhythm: Tachycardia present.      Heart sounds: No murmur heard.  Pulmonary:      Effort: Respiratory distress present.      Breath sounds: Normal breath sounds.   Abdominal:      General: There is distension.      Palpations: Abdomen is soft.      Tenderness: There is abdominal tenderness.      Comments: Moderate tenderness to palpation diffusely   Musculoskeletal:         General: No swelling or signs of injury.      Cervical back: Normal range of motion and neck supple.      Comments: Status post right AKA   Skin:     General: Skin is warm and dry.   Neurological:      Mental Status: He is alert.      Cranial Nerves: No cranial nerve deficit.      Sensory: No sensory deficit.      Comments: Alert, awake, oriented to person and place   Psychiatric:         Mood and Affect: Mood normal.         Thought Content: Thought content normal.         Results Reviewed       Procedure Component Value Units Date/Time    Beta Hydroxybutyrate [[id number]]  (Normal) Collected: 03/15/25 0348    Lab Status: Final result Specimen: Blood from Arm, Left Updated: 03/15/25 0522     Beta- Hydroxybutyrate 0.12 mmol/L      Urine Microscopic [[id number]]  (Abnormal) Collected: 03/15/25 0433    Lab Status: Final result Specimen: Urine, Clean Catch Updated: 03/15/25 0504     RBC, UA       Field obscured, unable to enumerate     /hpf     WBC, UA Innumerable /hpf      Epithelial Cells       Field obscured, unable to enumerate     /hpf     Bacteria, UA       Field obscured, unable to enumerate     /hpf     OTHER OBSERVATIONS Yeast Cells Present  WBCs Clumped    B-Type Natriuretic Peptide(BNP) [[id number]]  (Abnormal) Collected: 03/15/25 0348    Lab Status: Final result Specimen: Blood from Arm, Left Updated: 03/15/25 0454      pg/mL     APTT [[id number]]     Lab Status: No result Specimen: Blood     CBC [[id number]]     Lab Status: No result Specimen: Blood     Protime-INR [[id number]]     Lab Status: No result Specimen: Blood     UA (URINE) with reflex to Scope [[id number]]  (Abnormal) Collected: 03/15/25 0433    Lab Status: Final result Specimen: Urine, Clean Catch Updated: 03/15/25 0439     Color, UA Yellow     Clarity, UA Slightly Cloudy     Specific Gravity, UA 1.015     pH, UA 6.0     Leukocytes, UA 1+     Nitrite, UA Negative     Protein, UA Trace mg/dl      Glucose, UA 3+ mg/dl      Ketones, UA Negative mg/dl      Urobilinogen, UA 0.2 E.U./dl      Bilirubin, UA Negative     Occult Blood, UA 1+    Urine culture [[id number]] Collected: 03/15/25 0433    Lab Status: In process Specimen: Urine, Clean Catch Updated: 03/15/25 0436    TSH, 3rd generation with Free T4 reflex [[id number]]  (Abnormal) Collected: 03/15/25 0348    Lab Status: Final result Specimen: Blood from Arm, Left Updated: 03/15/25 0432     TSH 3RD GENERATON 4.933 uIU/mL     T4, free [[id number]] Collected: 03/15/25 0348    Lab Status: In process Specimen: Blood from Arm, Left Updated: 03/15/25 0432    COVID-19/ Infleunza A/B Rapid Anitgen(30 min. TAT) [[id number]]  (Normal) Collected: 03/15/25 0306    Lab Status: Final result Specimen: Nares from Nose Updated: 03/15/25  0427     SARS COV Rapid Antigen Negative     Influenza A Rapid Antigen Negative     Influenza B Rapid Antigen Negative    Narrative:      This test has been performed using the [employer] Sherley 2 FLU+SARS Antigen test under the Emergency Use Authorization (EUA). This test has been validated by the  and verified by the performing laboratory. The Sherley uses lateral flow immunofluorescent sandwich assay to detect SARS-COV, Influenza A and Influenza B Antigen.     The [employer]idel Sherley 2 SARS Antigen test does not differentiate between SARS-CoV and SARS-CoV-2.     Negative results are presumptive and may be confirmed with a molecular assay, if necessary, for patient management. Negative results do not rule out SARS-CoV-2 or influenza infection and should not be used as the sole basis for treatment or patient management decisions. A negative test result may occur if the level of antigen in a sample is below the limit of detection of this test.     Positive results are indicative of the presence of viral antigens, but do not rule out bacterial infection or co-infection with other viruses.     All test results should be used as an adjunct to clinical observations and other information available to the provider.    FOR PEDIATRIC PATIENTS - copy/paste COVID Guidelines URL to browser: [email or web address]    CK [[id number]]  (Normal) Collected: 03/15/25 0348    Lab Status: Final result Specimen: Blood from Arm, Left Updated: 03/15/25 0427     Total CK 60 U/L     Procalcitonin [[id number]]  (Abnormal) Collected: 03/15/25 0348    Lab Status: Final result Specimen: Blood from Arm, Left Updated: 03/15/25 0426     Procalcitonin 6.74 ng/ml     HS Troponin I 2hr [[id number]]     Lab Status: No result Specimen: Blood     HS Troponin I 4hr [[id number]]     Lab Status: No result Specimen: Blood     HS Troponin 0hr (reflex protocol) [[id number]]  (Abnormal) Collected: 03/15/25 0348     Lab Status: Final result Specimen: Blood from Arm, Left Updated: 03/15/25 0425     hs TnI 0hr 514 ng/L     Lactic acid, plasma (w/reflex if result > 2.0) [[id number]]  (Abnormal) Collected: 03/15/25 0348    Lab Status: Final result Specimen: Blood from Arm, Left Updated: 03/15/25 0424     LACTIC ACID 8.0 mmol/L     Narrative:      Result may be elevated if tourniquet was used during collection.    Lactic acid 2 Hours [[id number]]     Lab Status: No result Specimen: Blood     Comprehensive metabolic panel [[id number]]  (Abnormal) Collected: 03/15/25 0348    Lab Status: Final result Specimen: Blood from Arm, Left Updated: 03/15/25 0421     Sodium 134 mmol/L      Potassium 3.0 mmol/L      Chloride 100 mmol/L      CO2 14 mmol/L      ANION GAP 20 mmol/L      BUN 24 mg/dL      Creatinine 1.49 mg/dL      Glucose 231 mg/dL      Calcium 7.7 mg/dL      Corrected Calcium 8.6 mg/dL      AST 16 U/L      ALT 12 U/L      Alkaline Phosphatase 101 U/L      Total Protein 5.5 g/dL      Albumin 2.9 g/dL      Total Bilirubin 0.59 mg/dL      eGFR 44 ml/min/1.73sq m     Narrative:      National Kid[employer] Disease Foundation guidelines for Chronic Kidney Disease (CKD):     Stage 1 with normal or high GFR (GFR > 90 mL/min/1.73 square meters)    Stage 2 Mild CKD (GFR = 60-89 mL/min/1.73 square meters)    Stage 3A Moderate CKD (GFR = 45-59 mL/min/1.73 square meters)    Stage 3B Moderate CKD (GFR = 30-44 mL/min/1.73 square meters)    Stage 4 Severe CKD (GFR = 15-29 mL/min/1.73 square meters)    Stage 5 End Stage CKD (GFR <15 mL/min/1.73 square meters)  Note: GFR calculation is accurate only with a steady state creatinine    Magnesium [[id number]]  (Abnormal) Collected: 03/15/25 0348    Lab Status: Final result Specimen: Blood from Arm, Left Updated: 03/15/25 0421     Magnesium 1.8 mg/dL     Lipase [[id number]]  (Abnormal) Collected: 03/15/25 0348    Lab Status: Final result Specimen: Blood from Arm, Left Updated: 03/15/25 0421     Lipase <6 u/L      APTT [[id number]]  (Normal) Collected: 03/15/25 0348    Lab Status: Final result Specimen: Blood from Arm, Left Updated: 03/15/25 0416     PTT 23 seconds     Protime-INR [[id number]]  (Abnormal) Collected: 03/15/25 0348    Lab Status: Final result Specimen: Blood from Arm, Left Updated: 03/15/25 0416     Protime 17.2 seconds      INR 1.37    Narrative:      INR Therapeutic Range    Indication                                             INR Range      Atrial Fibrillation                                               2.0-3.0  Hypercoagulable State                                    2.0.2.3  Left Ventricular Asist Device                            2.0-3.0  Mechanical Heart Valve                                  -    Aortic(with afib, MI, embolism, HF, LA enlargement,    and/or coagulopathy)                                     2.0-3.0 (2.5-3.5)     Mitral                                                             2.5-3.5  Prosthetic/Bioprosthetic Heart Valve               2.0-3.0  Venous thromboembolism (VTE: VT, PE        2.0-3.0    CBC and differential [[id number]]  (Abnormal) Collected: 03/15/25 0348    Lab Status: Preliminary result Specimen: Blood from Arm, Left Updated: 03/15/25 0358     WBC 15.21 Thousand/uL      RBC 4.78 Million/uL      Hemoglobin 12.6 g/dL      Hematocrit 42.8 %      MCV 90 fL      MCH 26.4 pg      MCHC 29.4 g/dL      RDW 16.6 %      MPV 9.9 fL      Platelets 376 Thousands/uL     Blood culture #2 [[id number]] Collected: 03/15/25 0353    Lab Status: In process Specimen: Blood from Line, Venous Updated: 03/15/25 0356    Blood culture #1 [[id number]] Collected: 03/15/25 0348    Lab Status: In process Specimen: Blood from Line, Venous Updated: 03/15/25 0354    Fingerstick Glucose (POCT) [[id number]]  (Abnormal) Collected: 03/15/25 0314    Lab Status: Final result Specimen: Blood Updated: 03/15/25 0316     POC Glucose 222 mg/dl             CT pe study w abdomen pelvis w contrast   Final Interpretation by  Mirtha Harrell MD (03/15 0590)      Abnormal appearance of the bowel with pneumatosis intestinalis, extensive mesenteric venous gas and extensive portal venous gas within the liver, as described above. Please see discussion. Bowel ischemia should be considered. Urgent Surgical consultation    is recommended.      There are pulmonary emboli. Although the RV/LV ratio is less than 0.9, there is questionable leftward bowing of the interventricular septum. Findings concerning for developing pulmonary infarctions in the right middle and lower lobe of the lung.    Pulmonology consultation recommended.      The urinary bladder wall appears mildly thickened and there is slight urothelial enhancement. Correlation with urinalysis and urine culture and sensitivity is recommended.      Extensive osseous metastatic disease.      Atherosclerosis. Coronary artery disease.      Small amount of ascites.      Other findings as above.      The images are available for clinical review.         I personally discussed this study with AYALA RICHARDSON on 3/15/2025 4:25 AM.               Workstation performed: [id number]         XR chest 1 view portable   ED Interpretation by Ayala Richardson MD (03/15 5200)   No infiltrate or pneumothorax          ECG 12 Lead Documentation Only    Date/Time: 3/15/2025 3:07 AM    Performed by: Ayala Richardson MD  Authorized by: Ayala Richardson MD    Indications / Diagnosis:  Dyspnea  ECG reviewed by me, the ED Provider: yes    Patient location:  ED  Rate:     ECG rate:  157    ECG rate assessment: tachycardic    Rhythm:     Rhythm: atrial fibrillation    QRS:     QRS axis:  Normal  ST segments:     ST segments:  Non-specific  T waves:     T waves: non-specific    Comments:      Atrial fibrillation with rapid ventricular response at 157 beats per minute, normal axis, , QTc 488, nonspecific ST-T wave abnormality, no definite evidence of acute ischemia  ECG 12 Lead Documentation Only    Date/Time:  3/15/2025 5:45 AM    Performed by: Noé Felder MD  Authorized by: Noé Felder MD    Indications / Diagnosis:  Tachycardia  ECG reviewed by me, the ED Provider: yes    Patient location:  ED  Rate:     ECG rate:  152    ECG rate assessment: tachycardic    Rhythm:     Rhythm: sinus tachycardia    QRS:     QRS axis:  Normal  ST segments:     ST segments:  Non-specific  T waves:     T waves: non-specific    Comments:      Sinus tachycardia at 152, normal axis, , , QTc 508, nonspecific ST-T wave abnormality, no definite evidence of acute ischemia      ED Medication and Procedure Management   Prior to Admission Medications   Prescriptions Last Dose Informant Patient Reported? Taking?   Accu-Chek FastClix Lancets MISC  Outside Facility (Specify) Yes No   Alcohol Swabs (Alcohol Pads) 70 % PADS  Outside Facility (Specify) Yes No   Sig: USE 2-3 TIMES AS DIRECTED.   Blood Glucose Monitoring Suppl (Accu-Chek Guide Me) w/Device KIT  Outside Facility (Specify) No No   Sig: USE AS DIRECTED   Diclofenac Sodium (VOLTAREN) 1 %  Outside Facility (Specify) No No   Sig: Apply 2 g topically 4 (four) times a day   Empagliflozin (Jardiance) 10 MG TABS tablet  Outside Facility (Specify) No No   Sig: TAKE 1 TABLET (10 MG TOTAL) BY MOUTH EVERY MORNING   Lancet Devices (Lancing Device) MISC  Outside Facility (Specify) No No   Sig: TID   acetaminophen (TYLENOL) 325 mg tablet  Outside Facility (Specify) No No   Sig: Take 3 tablets (975 mg total) by mouth every 8 (eight) hours   allopurinol (ZYLOPRIM) 100 mg tablet  Outside Facility (Specify) No No   Sig: TAKE 1 TABLET (100 MG TOTAL) BY MOUTH DAILY   aspirin 81 mg chewable tablet  Outside Facility (Specify) No No   Sig: Chew 1 tablet (81 mg total) daily   atorvastatin (LIPITOR) 80 mg tablet  Outside Facility (Specify) No No   Sig: Take 1 tablet (80 mg total) by mouth daily with dinner   bismuth subsalicylate (PEPTO BISMOL) 262 MG chewable tablet  Outside Facility (Specify)  Yes No   Sig: Chew 524 mg As needed   clopidogrel (PLAVIX) 75 mg tablet  Outside Facility (Specify) No No   Sig: Take 1 tablet (75 mg total) by mouth daily   ezetimibe (ZETIA) 10 mg tablet  Outside Facility (Specify) No No   Sig: TAKE 1 TABLET (10 MG TOTAL) BY MOUTH DAILY   famotidine (PEPCID) 20 mg tablet  Outside Facility (Specify) No No   Sig: Take 1 tablet (20 mg total) by mouth daily   ferrous sulfate 325 (65 Fe) mg tablet  Outside Facility (Specify) No No   Sig: Take 1 tablet (325 mg total) by mouth daily   finasteride (PROSCAR) 5 mg tablet  Outside Facility (Specify) No No   Sig: TAKE 1 TABLET (5 MG TOTAL) BY MOUTH DAILY   furosemide (LASIX) 20 mg tablet  Outside Facility (Specify) No No   Sig: TAKE 2 TABLETS (40 MG TOTAL) BY MOUTH DAILY   gabapentin (NEURONTIN) 300 mg capsule  Outside Facility (Specify) No No   Sig: TAKE 2 CAPSULES (600 MG TOTAL) BY MOUTH 2 (TWO) TIMES A DAY TO REDUCE NERVE PAIN IN FEET.   glucose blood (Accu-Chek Guide) test strip  Outside Facility (Specify) No No   Sig: TEST 2-3 TIMES AS DIRECTED   guaiFENesin (MUCINEX) 600 mg 12 hr tablet  Outside Facility (Specify) No No   Sig: Take 1 tablet (600 mg total) by mouth every 12 (twelve) hours   loperamide (IMODIUM) 2 mg capsule  Outside Facility (Specify) No No   Sig: Take 1 capsule (2 mg total) by mouth 4 (four) times a day as needed for diarrhea   losartan (COZAAR) 25 mg tablet  Outside Facility (Specify) No No   Sig: TAKE 0.5 TABLETS (12.5 MG TOTAL) BY MOUTH DAILY   methocarbamol (ROBAXIN) 500 mg tablet  Outside Facility (Specify) No No   Sig: TAKE 1 TABLET (500 MG TOTAL) BY MOUTH 3 (THREE) TIMES A DAY   metoprolol succinate (TOPROL-XL) 25 mg 24 hr tablet  Outside Facility (Specify) No No   Sig: TAKE ONE (1) TABLET BY MOUTH DAILY   ondansetron (ZOFRAN) 4 mg tablet  Outside Facility (Specify) No No   Sig: Take 1 tablet (4 mg total) by mouth every 8 (eight) hours as needed for nausea or vomiting   oxyCODONE (ROXICODONE) 5 immediate release  tablet  Outside Facility (Specify) No No   Sig: Take 1 tablet (5 mg total) by mouth every 4 (four) hours as needed for moderate pain Max Daily Amount: 30 mg   Patient not taking: Reported on 3/5/2025   pantoprazole (PROTONIX) 40 mg tablet  Outside Facility (Specify) No No   Sig: Take 1 tablet (40 mg total) by mouth 2 (two) times a day before meals   pentoxifylline (TRENtal) 400 mg ER tablet  Outside Facility (Specify) No No   Sig: Take 1 tablet (400 mg total) by mouth 3 (three) times a day with meals   potassium chloride (Klor-Con M20) 20 mEq tablet   No No   Sig: TAKE 1 TABLET (20 MEQ TOTAL) BY MOUTH DAILY   predniSONE 5 mg tablet  Outside Facility (Specify) No No   Sig: TAKE 1 TABLET (5 MG TOTAL) BY MOUTH 2 (TWO) TIMES A DAY WITH MEALS   sucralfate (CARAFATE) 1 g tablet  Outside Facility (Specify) No No   Sig: Take 1 tablet (1 g total) by mouth 4 (four) times a day (before meals and at bedtime)   Patient not taking: Reported on 3/5/2025   tamsulosin (FLOMAX) 0.4 mg  Outside Facility (Specify) No No   Sig: TAKE 1 CAPSULE (0.4 MG TOTAL) BY MOUTH DAILY WITH DINNER   venlafaxine (EFFEXOR) 25 mg tablet  Outside Facility (Specify) No No   Sig: Take 1 tablet (25 mg total) by mouth in the morning   Patient not taking: Reported on 3/5/2025      Facility-Administered Medications: None     Discharge Medication List as of 3/15/2025  5:42 AM        CONTINUE these medications which have NOT CHANGED    Details   Accu-Chek FastClix Lancets MISC Historical Med      acetaminophen (TYLENOL) 325 mg tablet Take 3 tablets (975 mg total) by mouth every 8 (eight) hours, Starting Fri 1/24/2025, No Print      Alcohol Swabs (Alcohol Pads) 70 % PADS USE 2-3 TIMES AS DIRECTED., Historical Med      allopurinol (ZYLOPRIM) 100 mg tablet TAKE 1 TABLET (100 MG TOTAL) BY MOUTH DAILY, Starting Thu 2/27/2025, Normal      aspirin 81 mg chewable tablet Chew 1 tablet (81 mg total) daily, Starting Thu 11/14/2024, Normal      atorvastatin (LIPITOR) 80 mg  tablet Take 1 tablet (80 mg total) by mouth daily with dinner, Starting Thu 11/14/2024, Normal      bismuth subsalicylate (PEPTO BISMOL) 262 MG chewable tablet Chew 524 mg As needed, Historical Med      Blood Glucose Monitoring Suppl (Accu-Chek Guide Me) w/Device KIT USE AS DIRECTED, Normal      clopidogrel (PLAVIX) 75 mg tablet Take 1 tablet (75 mg total) by mouth daily, Starting Thu 11/14/2024, Normal      Diclofenac Sodium (VOLTAREN) 1 % Apply 2 g topically 4 (four) times a day, Starting Fri 1/24/2025, No Print      Empagliflozin (Jardiance) 10 MG TABS tablet TAKE 1 TABLET (10 MG TOTAL) BY MOUTH EVERY MORNING, Starting Fri 8/23/2024, Normal      ezetimibe (ZETIA) 10 mg tablet TAKE 1 TABLET (10 MG TOTAL) BY MOUTH DAILY, Starting Thu 2/27/2025, Until Tue 8/26/2025, Normal      famotidine (PEPCID) 20 mg tablet Take 1 tablet (20 mg total) by mouth daily, Starting Tue 3/14/2023, Until Wed 3/5/2025, Normal      ferrous sulfate 325 (65 Fe) mg tablet Take 1 tablet (325 mg total) by mouth daily, Starting Thu 11/14/2024, Normal      finasteride (PROSCAR) 5 mg tablet TAKE 1 TABLET (5 MG TOTAL) BY MOUTH DAILY, Starting Thu 2/27/2025, Normal      furosemide (LASIX) 20 mg tablet TAKE 2 TABLETS (40 MG TOTAL) BY MOUTH DAILY, Starting Fri 11/15/2024, Normal      gabapentin (NEURONTIN) 300 mg capsule TAKE 2 CAPSULES (600 MG TOTAL) BY MOUTH 2 (TWO) TIMES A DAY TO REDUCE NERVE PAIN IN FEET., Normal      glucose blood (Accu-Chek Guide) test strip TEST 2-3 TIMES AS DIRECTED, Normal      guaiFENesin (MUCINEX) 600 mg 12 hr tablet Take 1 tablet (600 mg total) by mouth every 12 (twelve) hours, Starting Fri 1/24/2025, No Print      Lancet Devices (Lancing Device) MISC TID, Normal      loperamide (IMODIUM) 2 mg capsule Take 1 capsule (2 mg total) by mouth 4 (four) times a day as needed for diarrhea, Starting Tue 3/14/2023, Normal      losartan (COZAAR) 25 mg tablet TAKE 0.5 TABLETS (12.5 MG TOTAL) BY MOUTH DAILY, Starting Mon 2/10/2025,  Normal      methocarbamol (ROBAXIN) 500 mg tablet TAKE 1 TABLET (500 MG TOTAL) BY MOUTH 3 (THREE) TIMES A DAY, Starting Thu 2/13/2025, Normal      metoprolol succinate (TOPROL-XL) 25 mg 24 hr tablet TAKE ONE (1) TABLET BY MOUTH DAILY, Starting Mon 2/10/2025, Normal      ondansetron (ZOFRAN) 4 mg tablet Take 1 tablet (4 mg total) by mouth every 8 (eight) hours as needed for nausea or vomiting, Starting Fri 7/26/2024, Normal      oxyCODONE (ROXICODONE) 5 immediate release tablet Take 1 tablet (5 mg total) by mouth every 4 (four) hours as needed for moderate pain Max Daily Amount: 30 mg, Starting Fri 1/24/2025, No Print      pantoprazole (PROTONIX) 40 mg tablet Take 1 tablet (40 mg total) by mouth 2 (two) times a day before meals, Starting Fri 1/24/2025, No Print      pentoxifylline (TRENtal) 400 mg ER tablet Take 1 tablet (400 mg total) by mouth 3 (three) times a day with meals, Starting Sun 11/3/2024, Until Wed 3/5/2025, Normal      potassium chloride (Klor-Con M20) 20 mEq tablet TAKE 1 TABLET (20 MEQ TOTAL) BY MOUTH DAILY, Starting Tue 3/11/2025, Until Sun 9/7/2025, Normal      predniSONE 5 mg tablet TAKE 1 TABLET (5 MG TOTAL) BY MOUTH 2 (TWO) TIMES A DAY WITH MEALS, Starting Mon 11/18/2024, Normal      sucralfate (CARAFATE) 1 g tablet Take 1 tablet (1 g total) by mouth 4 (four) times a day (before meals and at bedtime), Starting Fri 1/24/2025, No Print      tamsulosin (FLOMAX) 0.4 mg TAKE 1 CAPSULE (0.4 MG TOTAL) BY MOUTH DAILY WITH DINNER, Starting Thu 2/27/2025, Normal      venlafaxine (EFFEXOR) 25 mg tablet Take 1 tablet (25 mg total) by mouth in the morning, Starting Sat 1/25/2025, No Print           No discharge procedures on file.  ED SEPSIS DOCUMENTATION   Time reflects when diagnosis was documented in both MDM as applicable and the Disposition within this note       Time User Action Codes Description Comment    3/15/2025  3:05 AM Noé Felder Add [R06.00] Dyspnea     3/15/2025  3:05 AM Noé Felder Add  [R10.9] Abdominal pain     3/15/2025  3:05 AM Noé Felder Add [R19.7] Diarrhea     3/15/2025  3:05 AM Noé Felder Modify [R06.00] Dyspnea     3/15/2025  3:37 AM Noé Felder Add [I48.91] Atrial fibrillation with rapid ventricular response (HCC)     3/15/2025  3:37 AM Noé Felder Modify [I48.91] Atrial fibrillation with rapid ventricular response (HCC)     3/15/2025  4:20 AM Noé Felder Add [Z45.02] AICD discharge     3/15/2025  4:23 AM Noé Felder Add [E87.6] Hypokalemia     3/15/2025  4:23 AM Noé Felder Add [E83.42] Hypomagnesemia     3/15/2025  4:25 AM Noé Felder Add [E87.20] Lactic acidosis     3/15/2025  4:26 AM Noé Felder Add [R79.89] Elevated procalcitonin     3/15/2025  4:26 AM Noé Felder Add [R79.89] Elevated troponin     3/15/2025  4:34 AM Noé Felder Add [I26.99] Pulmonary emboli (HCC)     3/15/2025  4:41 AM Noé Felder Add [K55.9] Mesenteric ischemia (HCC)     3/15/2025  4:41 AM Noé Felder Add [K63.89] Pneumatosis intestinalis     3/15/2025  4:41 AM Noé Felder Modify [Z45.02] AICD discharge     3/15/2025  4:41 AM Noé Felder Modify [K55.9] Mesenteric ischemia (HCC)     3/15/2025  5:35 AM Noé Felder Add [R79.89] Elevated brain natriuretic peptide (BNP) level     3/15/2025  5:35 AM Noé Felder [R79.89] Elevated TSH                  Noé Felder MD  03/15/25 0542       Noé Felder MD  03/15/25 0546       Noé Felder MD  03/15/25 0503

## 2025-03-15 NOTE — ED NOTES
Pt O2 76% on RA. Pt placed on 4 L NC. O2 now 97%. Provider aware. Care ongoing.      Roxanne Jalloh RN  03/15/25 0617       Roxanne Jalloh RN  03/15/25 0612

## 2025-03-15 NOTE — ED PROCEDURE NOTE
Procedure  Intubation    Date/Time: 3/15/2025 6:55 AM    Performed by: Gracie Snider MD  Authorized by: Gracie Snider MD    Patient location:  ED  Consent:     Consent obtained:  Emergent situation  Pre-procedure details:     Patient status:  Unresponsive    Pretreatment medications:  Etomidate    Paralytics:  Succinylcholine  Indications:     Indications for intubation: respiratory failure    Procedure details:     Preoxygenation:  Bag valve mask    Intubation method:  Oral    Oral intubation technique:  Glidescope    Laryngoscope blade:  Mac 3    Tube size (mm):  8.0    Tube type:  Cuffed    Number of attempts:  1    Tube visualized through cords: yes    Placement assessment:     ETT to lip:  22    ETT to teeth:  23    Tube secured with:  ETT swanson    Breath sounds:  Equal    Placement verification: chest rise, condensation, colorimetric ETCO2 device, equal breath sounds, ETCO2 detector and tube exhalation    Post-procedure details:     Patient tolerance of procedure:  Tolerated well, no immediate complications  Central Line    Date/Time: 3/15/2025 6:57 AM    Performed by: Gracie Snider MD  Authorized by: Gracie Snider MD    Consent:     Consent obtained:  Emergent situation  Procedure details:     Location:  Right femoral    Vessel type: vein      Laterality:  Right    Approach: percutaneous technique used      Patient position:  Flat    Catheter type:  Triple lumen    Catheter size:  9 Fr    Landmarks identified: yes      Number of attempts:  2    Successful placement: yes      Catheter tip vessel location: iliac vein    Post-procedure details:     Post-procedure:  Line sutured    Assessment:  Blood return through all ports    Patient tolerance of procedure:  Tolerated well, no immediate complications                   Gracie Snider MD  03/15/25 0658

## 2025-03-15 NOTE — DEATH NOTE
INPATIENT DEATH NOTE  Royce Rene 79 y.o. 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 Molly Keenan DO  Did the patient's death occur in the ED?: No  Did the patient's death occur in the OR?: Yes  Did the patient's death occur within 24 hours of admission?: Yes  Was code status DNR at the time of death?: No    PHYSICAL EXAM:  Carotid pulse absent and Heart sounds absent    Medical Examiner notification criteria:  Patient  within 24 hours of arrival to hospital   Medical Examiner's office notified?:  Yes   Medical Examiner: Roberto   Not a  case    Family Notification  Was the family notified?: Yes  Date Notified: 03/15/25  Notified by: Dr. Keenan   Relationship to Patient: Son  Family Notification Route: At bedside    Autopsy Options:  Post-mortem examination declined by next of kin    Primary Service Attending Physician notified?:  yes - Attending:  No att. providers found    Physician/Resident responsible for completing Discharge Summary:      Discharge Summary - Surgery-General   Name: Royce Camargo y.o. male I MRN: [id number]  Unit/Bed#: OR [facility] Date of Admission: 3/15/2025   Date of Service: 3/16/2025 I Hospital Day: 1     Admission Date: 3/15/2025 0546  Discharge Date: 25  Admitting Diagnosis: Mesenteric ischemia (HCC) [K55.9]  Discharge Diagnosis:   Medical Problems         Resolved Problems  Date Reviewed: 2025   None            HPI: 79-year-old male admitted as a transfer from [facility] with concern for ischemic colitis and extensive pneumatosis on CT scan, peritonitic and hypotensive.  Patient presented from his nursing facility.  Patient has a history of metastatic prostate cancer, CAD and heart failure.     Procedures Performed:       Orders  Placed This Encounter   Procedures    Central Line    Intubation         Summary of Hospital Course: Patient presented to the [facility] ED in poor condition where he quickly deteriorated, and went into cardiac arrest requiring ACLS, intubation, multiple rounds of CPR and shocks.  ROSC was achieved intermittently.  Family was notified who expressed wishes to continue all heroic measures and proceed to the operating room.  ROSC was achieved and patient was transported to the operating room where he again underwent cardiac arrest.  Time of death was called at 0652 on 3/15/2025.      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       Provisions for Follow-Up Care:  See after visit summary for information related to follow-up care and any pertinent home health orders.       PCP: Anne Chandra MD     Disposition:  Death     Planned Readmission: No      Discharge Medications:  See after visit summary for reconciled discharge medications provided to patient and family.       Discharge Statement:  I have spent a total time of 60 minutes in caring for this patient on the day of the visit/encounter. >30 minutes of time was spent on: Multiple conversations with family regarding patient's clinical status, goals of care and options .

## 2025-03-15 NOTE — ED NOTES
150mg amiodarone being administered over 10 minutes by ELSA via IV     Tala Machuca RN  03/15/25 0600

## 2025-03-15 NOTE — ED NOTES
Pt cardiac rhythm vtach. Pt ICD fired twice. Pt now in sinus tachycardia. Dr. Felder notified. Care ongoing.      Roxanne Jalloh RN  03/15/25 0612

## 2025-03-15 NOTE — EMTALA/ACUTE CARE TRANSFER
[facility] EMERGENCY DEPARTMENT  [address] [address]  Dept: [phone]      EMTALA TRANSFER CONSENT    NAME Royce Rene                                         1945                              MRN [id number]    I have been informed of my rights regarding examination, treatment, and transfer   by Dr. Noé Felder MD    Benefits: Specialized equipment and/or services available at the receiving facility (Include comment)________________________ (surgery, EP, critical care)    Risks: Potential for delay in receiving treatment, Increased discomfort during transfer      Consent for Transfer:  I acknowledge that my medical condition has been evaluated and explained to me by the emergency department physician or other qualified medical person and/or my attending physician, who has recommended that I be transferred to the service of  Accepting Physician: Dr. Keenan at Accepting Facility Name, City & State : [facility], [place], [place]. The above potential benefits of such transfer, the potential risks associated with such transfer, and the probable risks of not being transferred have been explained to me, and I fully understand them.  The doctor has explained that, in my case, the benefits of transfer outweigh the risks.  I agree to be transferred.    I authorize the performance of emergency medical procedures and treatments upon me in both transit and upon arrival at the receiving facility.  Additionally, I authorize the release of any and all medical records to the receiving facility and request they be transported with me, if possible.  I understand that the safest mode of transportation during a medical emergency is an ambulance and that the Hospital advocates the use of this mode of transport. Risks of traveling to the receiving facility by car, including absence of medical control, life sustaining equipment, such as oxygen, and medical personnel has been explained to me and I fully  understand them.    (BELLA CORRECT BOX BELOW)  [  ]  I consent to the stated transfer and to be transported by ambulance/helicopter.  [  ]  I consent to the stated transfer, but refuse transportation by ambulance and accept full responsibility for my transportation by car.  I understand the risks of non-ambulance transfers and I exonerate the Hospital and its staff from any deterioration in my condition that results from this refusal.    X___________________________________________    DATE  03/15/25  TIME________  Signature of patient or legally responsible individual signing on patient behalf           RELATIONSHIP TO PATIENT_________________________          Provider Certification    NAME Royce Rene                                         1945                              MRN [id number]    A medical screening exam was performed on the above named patient.  Based on the examination:    Condition Necessitating Transfer The primary encounter diagnosis was Mesenteric ischemia (HCC). Diagnoses of Dyspnea, Abdominal pain, Diarrhea, Atrial fibrillation with rapid ventricular response (HCC), AICD discharge, Hypokalemia, Hypomagnesemia, Lactic acidosis, Elevated procalcitonin, Elevated troponin, Pulmonary emboli (HCC), and Pneumatosis intestinalis were also pertinent to this visit.    Patient Condition: An emergency transfer is being made prior to stabilization due to the need for definitive care and the benefit of transfer outweighs the risk    Reason for Transfer: Other (Include comment)____________________ (surgery, EP, critical care)    Transfer Requirements: Facility [facility], [place], [place]   Space available and qualified personnel available for treatment as acknowledged by PAC  Agreed to accept transfer and to provide appropriate medical treatment as acknowledged by       Dr. Keenan  Appropriate medical records of the examination and treatment of the patient are provided at the time of transfer   STAFF INITIAL WHEN  COMPLETED _______  Transfer will be performed by qualified personnel from    and appropriate transfer equipment as required, including the use of necessary and appropriate life support measures.    Provider Certification: I have examined the patient and explained the following risks and benefits of being transferred/refusing transfer to the patient/family:  General risk, such as traffic hazards, adverse weather conditions, rough terrain or turbulence, possible failure of equipment (including vehicle or aircraft), or consequences of actions of persons outside the control of the transport personnel, Unanticipated needs of medical equipment and personnel during transport      Based on these reasonable risks and benefits to the patient and/or the unborn child(corrie), and based upon the information available at the time of the patient’s examination, I certify that the medical benefits reasonably to be expected from the provision of appropriate medical treatments at another medical facility outweigh the increasing risks, if any, to the individual’s medical condition, and in the case of labor to the unborn child, from effecting the transfer.    X____________________________________________ DATE 03/15/25        TIME_______      ORIGINAL - SEND TO MEDICAL RECORDS   COPY - SEND WITH PATIENT DURING TRANSFER

## 2025-03-15 NOTE — RESPIRATORY THERAPY NOTE
RT Protocol Note  Royce Rene 79 y.o. male MRN: [id number]  Unit/Bed#: ED 03 Encounter: [id number]    Assessment    Active Problems:  There are no active Hospital Problems.      Home Pulmonary Medications:     03/15/25 0634   Respiratory Protocol   Protocol Initiated? Yes   Protocol Selection Respiratory   Language Barrier? Yes   Medical & Social History Reviewed? Yes   Diagnostic Studies Reviewed? Yes   Physical Assessment Performed? Yes   Home Devices/Therapy Home O2   Respiratory Plan Moderate/Severe Distress pathway   Respiratory Assessment   Assessment Type Assess only   General Appearance Sedated   Respiratory Pattern Tachypneic   Chest Assessment Chest expansion symmetrical   Bilateral Breath Sounds Diminished   Cough None   Resp Comments Called to ED 3 for intubation. Pt going to OR. Pt coded several times, cpr started. shock 2x. Pt was intubated with ETT size 8 at 21 lip. Bagging x 25 minutes. Getting ready to go to the OR.A       Home Devices/Therapy: (P) Home O2    Past Medical History:   Diagnosis Date    Cancer ([facility]) 2002    tailbone    Coronary artery disease     S/p stenting to circumflex and RCA    HFrEF (heart failure with reduced ejection fraction) ([facility]) 2021    High cholesterol     Pacemaker     Prostate cancer ([facility])      Social History     Socioeconomic History    Marital status:      Spouse name: Not on file    Number of children: 3    Years of education: Not on file    Highest education level: Not on file   Occupational History    Not on file   Tobacco Use    Smoking status: Former     Current packs/day: 0.00     Types: Cigarettes     Start date: 1962     Quit date: 2002     Years since quittin.7     Passive exposure: Past    Smokeless tobacco: Never   Vaping Use    Vaping status: Never Used   Substance and Sexual Activity    Alcohol use: Not Currently    Drug use: Not Currently    Sexual activity: Not on file   Other Topics Concern    Not on file   Social History  Narrative    Single    Son lives in the attic of his home, minimal contact     Social Drivers of Health     Financial Resource Strain: Low Risk  (2023)    Overall Financial Resource Strain (CARDIA)     Difficulty of Paying Living Expenses: Not hard at all   Food Insecurity: No Food Insecurity (2025)    Nursing - Inadequate Food Risk Classification     Worried About Running Out of Food in the Last Year: Never true     Ran Out of Food in the Last Year: Never true     Ran Out of Food in the Last Year: Never true   Transportation Needs: No Transportation Needs (2025)    Nursing - Transportation Risk Classification     Lack of Transportation: Not on file     Lack of Transportation: No   Recent Concern: Transportation Needs - Unmet Transportation Needs (2024)    OASIS : Transportation     Lack of Transportation (Medical): Yes     Lack of Transportation (Non-Medical): No     Patient Unable or Declines to Respond: No   Physical Activity: Not on file   Stress: Not on file   Social Connections: Not on file   Intimate Partner Violence: Unknown (2025)    Nursing IPS     Feels Physically and Emotionally Safe: Not on file     Physically Hurt by Someone: Not on file     Humiliated or Emotionally Abused by Someone: Not on file     Physically Hurt by Someone: No     Hurt or Threatened by Someone: No   Housing Stability: Unknown (2025)    Nursing: Inadequate Housing Risk Classification     Has Housing: Not on file     Worried About Losing Housing: Not on file     Unable to Get Utilities: Not on file     Unable to Pay for Housing in the Last Year: No     Has Housin       Subjective         Objective    Physical Exam:   Assessment Type: Assess only  General Appearance: Sedated  Respiratory Pattern: Tachypneic  Chest Assessment: Chest expansion symmetrical  Bilateral Breath Sounds: Diminished  Cough: None    Vitals:  Blood pressure (!) 73/47, pulse 102, resp. rate (!) 33, SpO2 96%.           Imaging and other studies:           Plan    Respiratory Plan: (P) Moderate/Severe Distress pathway        Resp Comments: Called to ED 3 for intubation. Pt going to OR. Pt coded several times, cpr started. shock 2x. Pt was intubated with ETT size 8 at 21 lip. Bagging x 25 minutes. Getting ready to go to the OR.A

## 2025-03-16 LAB
BACTERIA UR CULT: ABNORMAL
BACTERIA UR CULT: ABNORMAL

## 2025-03-17 ENCOUNTER — TRANSITIONAL CARE MANAGEMENT (OUTPATIENT)
Dept: INTERNAL MEDICINE CLINIC | Facility: CLINIC | Age: 80
End: 2025-03-17

## 2025-03-17 PROCEDURE — 85060 BLOOD SMEAR INTERPRETATION: CPT | Performed by: PATHOLOGY

## 2025-03-17 NOTE — DISCHARGE SUMMARY
Discharge Summary - Surgery-General   Name: Royce Rene 79 y.o. male I MRN: [id number]  Unit/Bed#: OR POOL I Date of Admission: 3/15/2025   Date of Service: 3/16/2025 I Hospital Day: 1    Admission Date: 3/15/2025 0546  Discharge Date: 03/16/25  Admitting Diagnosis: Mesenteric ischemia (HCC) [K55.9]  Discharge Diagnosis:   Medical Problems       Resolved Problems  Date Reviewed: 2/12/2025   None         HPI: 79-year-old male admitted as a transfer from [facility] with concern for ischemic colitis and extensive pneumatosis on CT scan, peritonitic and hypotensive.  Patient presented from his nursing facility.  Patient has a history of metastatic prostate cancer, CAD and heart failure.    Procedures Performed:   Orders Placed This Encounter   Procedures    Central Line    Intubation       Summary of Hospital Course: Patient presented to the [facility] ED in poor condition where he quickly deteriorated, and went into cardiac arrest requiring ACLS, intubation, multiple rounds of CPR and shocks.  ROSC was achieved intermittently.  Family was notified who expressed wishes to continue all heroic measures and proceed to the operating room.  ROSC was achieved and patient was transported to the operating room where he again underwent cardiac arrest.  Time of death was called at 0652 on 3/15/2025.     Significant Findings, Care, Treatment and Services Provided:   -ATLS    Complications: Death    Condition at Discharge:    Date, Time and Cause of Death    Date of Death: 3/15/25  Time of Death:  6:52 AM  Preliminary Cause of Death: Injury of superior mesenteric artery         Discharge instructions/Information to patient and family:   See After Visit Summary (AVS) for information provided to patient and family.      Provisions for Follow-Up Care:  See after visit summary for information related to follow-up care and any pertinent home health orders.      PCP: Anne Chandra MD    Disposition:   Death    Planned Readmission: No     Discharge Medications:  See after visit summary for reconciled discharge medications provided to patient and family.      Discharge Statement:  I have spent a total time of 60 minutes in caring for this patient on the day of the visit/encounter. >30 minutes of time was spent on: Multiple conversations with family regarding patient's clinical status, goals of care and options .

## 2025-03-17 NOTE — H&P
H&P - Surgery-General   Name: Royce Rene 79 y.o. male I MRN: [id number]  Unit/Bed#: OR POOL I Date of Admission: 3/15/2025   Date of Service: 3/16/2025 I Hospital Day: 1     Assessment & Plan  Ischemic colitis (HCC)  79-year-old male with concern for ischemic colitis transferred to [facility] for surgical critical care management    3/15 CT A/P: Abnormal appearance of the bowel with pneumatosis intestinalis, extensive mesenteric venous gas and extensive portal venous gas within the liver, as described above. Please see discussion. Bowel ischemia should be considered.     Tachycardic, hypotensive    Plan  -OR for exploratory laparotomy, consent given by son    History of Present Illness   Royce Rene is a 79 y.o. male who presents with abdominal pain from his nursing facility.  CT scan was concerning for ischemic colitis for which she was transferred to the [facility] for surgery critical care intervention.  Patient presented in poor condition and quickly deteriorated following admission.  In the ED patient underwent ATLS due to cardiac arrest.  ROSC was achieved.  Son was contacted via phone who asked for heroic measures to be continued and for the team to proceed to the operating room for exploratory laparotomy.     Review of Systems  Historical Information   Past Medical History:   Diagnosis Date    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    Coronary artery disease 2001    S/p stenting to circumflex and RCA    HFrEF (heart failure with reduced ejection fraction) ([facility]) 06/14/2021    High cholesterol     Pacemaker     Prostate cancer ([facility])      Past Surgical History:   Procedure Laterality Date    AMPUTATION ABOVE KNEE (AKA) Right 1/8/2025    Procedure: RIGHT AMPUTATION ABOVE KNEE (AKA);  Surgeon: Serina Cook DO;  Location: [facility];  Service: Vascular    CARDIAC CATHETERIZATION      CARDIAC ELECTROPHYSIOLOGY PROCEDURE N/A 12/23/2021    Procedure: Implant ICD - bi ventricular;  Surgeon: Jonas Oviedo  MD;  Location: [facility] CARDIAC CATH LAB;  Service: Cardiology    COLONOSCOPY      IR LOWER EXTREMITY ANGIOGRAM  2023    IR LOWER EXTREMITY ANGIOGRAM  10/30/2024    IR LOWER EXTREMITY ANGIOGRAM  2025    ND TEAEC W/WO PATCH GRAFT COMMON FEMORAL Right 2021    Procedure: ENDARTERECTOMY ARTERIAL FEMORAL;  Surgeon: Serina Cook DO;  Location: [facility];  Service: Vascular     Social History     Tobacco Use    Smoking status: Former     Current packs/day: 0.00     Types: Cigarettes     Start date: 1962     Quit date: 2002     Years since quittin.7     Passive exposure: Past    Smokeless tobacco: Never   Vaping Use    Vaping status: Never Used   Substance and Sexual Activity    Alcohol use: Not Currently    Drug use: Not Currently    Sexual activity: Not on file     E-Cigarette/Vaping    E-Cigarette Use Never User      E-Cigarette/Vaping Substances    Nicotine No     THC No     CBD No     Flavoring No     Other No     Unknown No      Family History   Problem Relation Age of Onset    Cancer Mother      Social History     Tobacco Use    Smoking status: Former     Current packs/day: 0.00     Types: Cigarettes     Start date: 1962     Quit date: 2002     Years since quittin.7     Passive exposure: Past    Smokeless tobacco: Never   Vaping Use    Vaping status: Never Used   Substance and Sexual Activity    Alcohol use: Not Currently    Drug use: Not Currently    Sexual activity: Not on file     No current facility-administered medications for this encounter.  Prior to Admission Medications   Prescriptions Last Dose Informant Patient Reported? Taking?   Accu-Chek FastClix Lancets MISC  Outside Facility (Specify) Yes No   Alcohol Swabs (Alcohol Pads) 70 % PADS  Outside Facility (Specify) Yes No   Sig: USE 2-3 TIMES AS DIRECTED.   Blood Glucose Monitoring Suppl (Accu-Chek Guide Me) w/Device KIT  Outside Facility (Specify) No No   Sig: USE AS DIRECTED   Diclofenac Sodium (VOLTAREN) 1 %   Outside Facility (Specify) No No   Sig: Apply 2 g topically 4 (four) times a day   Empagliflozin (Jardiance) 10 MG TABS tablet  Outside Facility (Specify) No No   Sig: TAKE 1 TABLET (10 MG TOTAL) BY MOUTH EVERY MORNING   Lancet Devices (Lancing Device) MISC  Outside Facility (Specify) No No   Sig: TID   acetaminophen (TYLENOL) 325 mg tablet  Outside Facility (Specify) No No   Sig: Take 3 tablets (975 mg total) by mouth every 8 (eight) hours   allopurinol (ZYLOPRIM) 100 mg tablet  Outside Facility (Specify) No No   Sig: TAKE 1 TABLET (100 MG TOTAL) BY MOUTH DAILY   aspirin 81 mg chewable tablet  Outside Facility (Specify) No No   Sig: Chew 1 tablet (81 mg total) daily   atorvastatin (LIPITOR) 80 mg tablet  Outside Facility (Specify) No No   Sig: Take 1 tablet (80 mg total) by mouth daily with dinner   bismuth subsalicylate (PEPTO BISMOL) 262 MG chewable tablet  Outside Facility (Specify) Yes No   Sig: Chew 524 mg As needed   clopidogrel (PLAVIX) 75 mg tablet  Outside Facility (Specify) No No   Sig: Take 1 tablet (75 mg total) by mouth daily   ezetimibe (ZETIA) 10 mg tablet  Outside Facility (Specify) No No   Sig: TAKE 1 TABLET (10 MG TOTAL) BY MOUTH DAILY   famotidine (PEPCID) 20 mg tablet  Outside Facility (Specify) No No   Sig: Take 1 tablet (20 mg total) by mouth daily   ferrous sulfate 325 (65 Fe) mg tablet  Outside Facility (Specify) No No   Sig: Take 1 tablet (325 mg total) by mouth daily   finasteride (PROSCAR) 5 mg tablet  Outside Facility (Specify) No No   Sig: TAKE 1 TABLET (5 MG TOTAL) BY MOUTH DAILY   furosemide (LASIX) 20 mg tablet  Outside Facility (Specify) No No   Sig: TAKE 2 TABLETS (40 MG TOTAL) BY MOUTH DAILY   gabapentin (NEURONTIN) 300 mg capsule  Outside Facility (Specify) No No   Sig: TAKE 2 CAPSULES (600 MG TOTAL) BY MOUTH 2 (TWO) TIMES A DAY TO REDUCE NERVE PAIN IN FEET.   glucose blood (Accu-Chek Guide) test strip  Outside Facility (Specify) No No   Sig: TEST 2-3 TIMES AS DIRECTED    guaiFENesin (MUCINEX) 600 mg 12 hr tablet  Outside Facility (Specify) No No   Sig: Take 1 tablet (600 mg total) by mouth every 12 (twelve) hours   loperamide (IMODIUM) 2 mg capsule  Outside Facility (Specify) No No   Sig: Take 1 capsule (2 mg total) by mouth 4 (four) times a day as needed for diarrhea   losartan (COZAAR) 25 mg tablet  Outside Facility (Specify) No No   Sig: TAKE 0.5 TABLETS (12.5 MG TOTAL) BY MOUTH DAILY   methocarbamol (ROBAXIN) 500 mg tablet  Outside Facility (Specify) No No   Sig: TAKE 1 TABLET (500 MG TOTAL) BY MOUTH 3 (THREE) TIMES A DAY   metoprolol succinate (TOPROL-XL) 25 mg 24 hr tablet  Outside Facility (Specify) No No   Sig: TAKE ONE (1) TABLET BY MOUTH DAILY   ondansetron (ZOFRAN) 4 mg tablet  Outside Facility (Specify) No No   Sig: Take 1 tablet (4 mg total) by mouth every 8 (eight) hours as needed for nausea or vomiting   oxyCODONE (ROXICODONE) 5 immediate release tablet  Outside Facility (Specify) No No   Sig: Take 1 tablet (5 mg total) by mouth every 4 (four) hours as needed for moderate pain Max Daily Amount: 30 mg   Patient not taking: Reported on 3/5/2025   pantoprazole (PROTONIX) 40 mg tablet  Outside Facility (Specify) No No   Sig: Take 1 tablet (40 mg total) by mouth 2 (two) times a day before meals   pentoxifylline (TRENtal) 400 mg ER tablet  Outside Facility (Specify) No No   Sig: Take 1 tablet (400 mg total) by mouth 3 (three) times a day with meals   potassium chloride (Klor-Con M20) 20 mEq tablet   No No   Sig: TAKE 1 TABLET (20 MEQ TOTAL) BY MOUTH DAILY   predniSONE 5 mg tablet  Outside Facility (Specify) No No   Sig: TAKE 1 TABLET (5 MG TOTAL) BY MOUTH 2 (TWO) TIMES A DAY WITH MEALS   sucralfate (CARAFATE) 1 g tablet  Outside Facility (Specify) No No   Sig: Take 1 tablet (1 g total) by mouth 4 (four) times a day (before meals and at bedtime)   Patient not taking: Reported on 3/5/2025   tamsulosin (FLOMAX) 0.4 mg  Outside Facility (Specify) No No   Sig: TAKE 1 CAPSULE  (0.4 MG TOTAL) BY MOUTH DAILY WITH DINNER   venlafaxine (EFFEXOR) 25 mg tablet  Outside Facility (Specify) No No   Sig: Take 1 tablet (25 mg total) by mouth in the morning   Patient not taking: Reported on 3/5/2025      Facility-Administered Medications: None     Patient has no known allergies.    Objective :         Physical Exam  Constitutional:       General: He is in acute distress.      Appearance: He is ill-appearing and toxic-appearing.   Abdominal:      General: Abdomen is flat. There is distension.      Palpations: Abdomen is soft.      Tenderness: There is abdominal tenderness.   Neurological:      Mental Status: He is disoriented.         Lab Results: I have reviewed the following results:  Recent Labs     03/15/25  0348 03/15/25  0634   WBC 15.21*  --    HGB 12.6 7.5*   HCT 42.8 22*     --    BANDSPCT 34*  --    SODIUM 134*  --    K 3.0*  --      --    CO2 14* 19*   BUN 24  --    CREATININE 1.49*  --    GLUC 231*  --    CAIONIZED  --  1.29   MG 1.8*  --    AST 16  --    ALT 12  --    ALB 2.9*  --    TBILI 0.59  --    ALKPHOS 101  --    PTT 23  --    INR 1.37*  --    HSTNI0 514*  --    *  --    LACTICACID 8.0*  --        Imaging Results Review: I reviewed radiology reports from this admission including: CT abdomen/pelvis.  Other Study Results Review: No additional pertinent studies reviewed.    VTE Pharmacologic Prophylaxis: VTE covered by:    None     VTE Mechanical Prophylaxis: sequential compression device

## 2025-03-17 NOTE — ASSESSMENT & PLAN NOTE
79-year-old male with concern for ischemic colitis transferred to [facility] for surgical critical care management    3/15 CT A/P: Abnormal appearance of the bowel with pneumatosis intestinalis, extensive mesenteric venous gas and extensive portal venous gas within the liver, as described above. Please see discussion. Bowel ischemia should be considered.     Tachycardic, hypotensive    Plan  -OR for exploratory laparotomy, consent given by son

## 2025-03-19 LAB
ATRIAL RATE: 147 BPM
ATRIAL RATE: 152 BPM
P AXIS: 70 DEGREES
PR INTERVAL: 138 MS
QRS AXIS: 14 DEGREES
QRS AXIS: 3 DEGREES
QRSD INTERVAL: 144 MS
QRSD INTERVAL: 154 MS
QT INTERVAL: 302 MS
QT INTERVAL: 320 MS
QTC INTERVAL: 488 MS
QTC INTERVAL: 508 MS
T WAVE AXIS: 138 DEGREES
T WAVE AXIS: 163 DEGREES
VENTRICULAR RATE: 152 BPM
VENTRICULAR RATE: 157 BPM

## 2025-03-20 LAB
BACTERIA BLD CULT: NORMAL
BACTERIA BLD CULT: NORMAL

## 2025-03-25 DIAGNOSIS — I50.42 CHRONIC COMBINED SYSTOLIC AND DIASTOLIC CHF (CONGESTIVE HEART FAILURE) (HCC): ICD-10-CM

## 2025-03-25 RX ORDER — FUROSEMIDE 20 MG/1
40 TABLET ORAL DAILY
Qty: 60 TABLET | Refills: 5 | OUTPATIENT
Start: 2025-03-25

## 2025-04-01 DIAGNOSIS — I50.42 CHRONIC COMBINED SYSTOLIC AND DIASTOLIC CHF (CONGESTIVE HEART FAILURE) (HCC): ICD-10-CM

## 2025-04-01 RX ORDER — FUROSEMIDE 20 MG/1
40 TABLET ORAL DAILY
Qty: 60 TABLET | Refills: 5 | OUTPATIENT
Start: 2025-04-01

## 2025-04-08 DIAGNOSIS — I50.22 CHRONIC HFREF (HEART FAILURE WITH REDUCED EJECTION FRACTION) (HCC): ICD-10-CM

## 2025-04-08 DIAGNOSIS — C61 PROSTATE CANCER (HCC): ICD-10-CM

## 2025-04-08 DIAGNOSIS — I50.42 CHRONIC COMBINED SYSTOLIC AND DIASTOLIC CHF (CONGESTIVE HEART FAILURE) (HCC): ICD-10-CM

## 2025-04-08 DIAGNOSIS — I25.10 CORONARY ARTERY DISEASE INVOLVING NATIVE HEART WITHOUT ANGINA PECTORIS, UNSPECIFIED VESSEL OR LESION TYPE: ICD-10-CM

## 2025-04-08 RX ORDER — PREDNISONE 5 MG/1
5 TABLET ORAL 2 TIMES DAILY WITH MEALS
Qty: 60 TABLET | Refills: 5 | OUTPATIENT
Start: 2025-04-08

## 2025-04-08 RX ORDER — CLOPIDOGREL BISULFATE 75 MG/1
75 TABLET ORAL DAILY
Qty: 90 TABLET | Refills: 1 | OUTPATIENT
Start: 2025-04-08

## 2025-04-08 RX ORDER — FUROSEMIDE 20 MG/1
40 TABLET ORAL DAILY
Qty: 60 TABLET | Refills: 5 | OUTPATIENT
Start: 2025-04-08

## 2025-04-08 RX ORDER — EMPAGLIFLOZIN 10 MG/1
10 TABLET, FILM COATED ORAL EVERY MORNING
Qty: 30 TABLET | Refills: 5 | OUTPATIENT
Start: 2025-04-08

## 2025-04-15 DIAGNOSIS — I25.10 CORONARY ARTERY DISEASE INVOLVING NATIVE HEART WITHOUT ANGINA PECTORIS, UNSPECIFIED VESSEL OR LESION TYPE: ICD-10-CM

## 2025-04-15 DIAGNOSIS — I50.42 CHRONIC COMBINED SYSTOLIC AND DIASTOLIC CHF (CONGESTIVE HEART FAILURE) (HCC): ICD-10-CM

## 2025-04-15 DIAGNOSIS — I50.22 CHRONIC HFREF (HEART FAILURE WITH REDUCED EJECTION FRACTION) (HCC): ICD-10-CM

## 2025-04-15 DIAGNOSIS — I42.9 CARDIOMYOPATHY, UNSPECIFIED TYPE (HCC): ICD-10-CM

## 2025-04-15 RX ORDER — FUROSEMIDE 20 MG/1
40 TABLET ORAL DAILY
Qty: 60 TABLET | Refills: 5 | OUTPATIENT
Start: 2025-04-15

## 2025-04-15 RX ORDER — ATORVASTATIN CALCIUM 80 MG/1
80 TABLET, FILM COATED ORAL
Qty: 90 TABLET | Refills: 1 | OUTPATIENT
Start: 2025-04-15

## 2025-04-15 RX ORDER — EMPAGLIFLOZIN 10 MG/1
10 TABLET, FILM COATED ORAL EVERY MORNING
Qty: 30 TABLET | Refills: 5 | OUTPATIENT
Start: 2025-04-15

## 2025-04-15 RX ORDER — CLOPIDOGREL BISULFATE 75 MG/1
75 TABLET ORAL DAILY
Qty: 90 TABLET | Refills: 1 | OUTPATIENT
Start: 2025-04-15

## 2025-04-22 DIAGNOSIS — I50.22 CHRONIC HFREF (HEART FAILURE WITH REDUCED EJECTION FRACTION) (HCC): ICD-10-CM

## 2025-04-22 DIAGNOSIS — I50.42 CHRONIC COMBINED SYSTOLIC AND DIASTOLIC CHF (CONGESTIVE HEART FAILURE) (HCC): ICD-10-CM

## 2025-04-22 RX ORDER — EMPAGLIFLOZIN 10 MG/1
10 TABLET, FILM COATED ORAL EVERY MORNING
Qty: 30 TABLET | Refills: 5 | OUTPATIENT
Start: 2025-04-22

## 2025-04-22 RX ORDER — FUROSEMIDE 20 MG/1
40 TABLET ORAL DAILY
Qty: 60 TABLET | Refills: 5 | OUTPATIENT
Start: 2025-04-22

## 2025-04-29 DIAGNOSIS — I25.10 CORONARY ARTERY DISEASE INVOLVING NATIVE HEART WITHOUT ANGINA PECTORIS, UNSPECIFIED VESSEL OR LESION TYPE: ICD-10-CM

## 2025-04-29 DIAGNOSIS — I50.42 CHRONIC COMBINED SYSTOLIC AND DIASTOLIC CHF (CONGESTIVE HEART FAILURE) (HCC): ICD-10-CM

## 2025-04-29 DIAGNOSIS — I50.22 CHRONIC HFREF (HEART FAILURE WITH REDUCED EJECTION FRACTION) (HCC): ICD-10-CM

## 2025-04-29 DIAGNOSIS — I42.9 CARDIOMYOPATHY, UNSPECIFIED TYPE (HCC): ICD-10-CM

## 2025-04-29 RX ORDER — ATORVASTATIN CALCIUM 80 MG/1
80 TABLET, FILM COATED ORAL
Qty: 90 TABLET | Refills: 1 | OUTPATIENT
Start: 2025-04-29

## 2025-04-29 RX ORDER — FUROSEMIDE 20 MG/1
40 TABLET ORAL DAILY
Qty: 60 TABLET | Refills: 5 | OUTPATIENT
Start: 2025-04-29

## 2025-04-29 RX ORDER — EMPAGLIFLOZIN 10 MG/1
10 TABLET, FILM COATED ORAL EVERY MORNING
Qty: 30 TABLET | Refills: 5 | OUTPATIENT
Start: 2025-04-29

## 2025-04-29 RX ORDER — CLOPIDOGREL BISULFATE 75 MG/1
75 TABLET ORAL DAILY
Qty: 90 TABLET | Refills: 1 | OUTPATIENT
Start: 2025-04-29

## 2025-05-06 DIAGNOSIS — I42.9 CARDIOMYOPATHY, UNSPECIFIED TYPE (HCC): ICD-10-CM

## 2025-05-06 DIAGNOSIS — I50.42 CHRONIC COMBINED SYSTOLIC AND DIASTOLIC CHF (CONGESTIVE HEART FAILURE) (HCC): ICD-10-CM

## 2025-05-06 DIAGNOSIS — I25.10 CORONARY ARTERY DISEASE INVOLVING NATIVE HEART WITHOUT ANGINA PECTORIS, UNSPECIFIED VESSEL OR LESION TYPE: ICD-10-CM

## 2025-05-06 RX ORDER — FUROSEMIDE 20 MG/1
40 TABLET ORAL DAILY
Qty: 60 TABLET | Refills: 5 | OUTPATIENT
Start: 2025-05-06

## 2025-05-06 RX ORDER — ATORVASTATIN CALCIUM 80 MG/1
80 TABLET, FILM COATED ORAL
Qty: 90 TABLET | Refills: 1 | OUTPATIENT
Start: 2025-05-06

## 2025-05-06 RX ORDER — CLOPIDOGREL BISULFATE 75 MG/1
75 TABLET ORAL DAILY
Qty: 90 TABLET | Refills: 1 | OUTPATIENT
Start: 2025-05-06

## 2025-05-13 DIAGNOSIS — I25.10 CORONARY ARTERY DISEASE INVOLVING NATIVE HEART WITHOUT ANGINA PECTORIS, UNSPECIFIED VESSEL OR LESION TYPE: ICD-10-CM

## 2025-05-13 DIAGNOSIS — I42.9 CARDIOMYOPATHY, UNSPECIFIED TYPE (HCC): ICD-10-CM

## 2025-05-13 DIAGNOSIS — I50.42 CHRONIC COMBINED SYSTOLIC AND DIASTOLIC CHF (CONGESTIVE HEART FAILURE) (HCC): ICD-10-CM

## 2025-05-13 RX ORDER — CLOPIDOGREL BISULFATE 75 MG/1
75 TABLET ORAL DAILY
Qty: 90 TABLET | Refills: 1 | OUTPATIENT
Start: 2025-05-13

## 2025-05-13 RX ORDER — FUROSEMIDE 20 MG/1
40 TABLET ORAL DAILY
Qty: 60 TABLET | Refills: 5 | OUTPATIENT
Start: 2025-05-13

## 2025-05-13 RX ORDER — ATORVASTATIN CALCIUM 80 MG/1
80 TABLET, FILM COATED ORAL
Qty: 90 TABLET | Refills: 1 | OUTPATIENT
Start: 2025-05-13

## 2025-05-20 DIAGNOSIS — I50.42 CHRONIC COMBINED SYSTOLIC AND DIASTOLIC CHF (CONGESTIVE HEART FAILURE) (HCC): ICD-10-CM

## 2025-05-20 RX ORDER — FUROSEMIDE 20 MG/1
40 TABLET ORAL DAILY
Qty: 60 TABLET | Refills: 5 | OUTPATIENT
Start: 2025-05-20

## 2025-05-28 DIAGNOSIS — I50.42 CHRONIC COMBINED SYSTOLIC AND DIASTOLIC CHF (CONGESTIVE HEART FAILURE) (HCC): ICD-10-CM

## 2025-05-28 RX ORDER — FUROSEMIDE 20 MG/1
40 TABLET ORAL DAILY
Qty: 60 TABLET | Refills: 5 | OUTPATIENT
Start: 2025-05-28

## 2025-06-03 DIAGNOSIS — C61 PROSTATE CANCER (HCC): ICD-10-CM

## 2025-06-03 DIAGNOSIS — I50.42 CHRONIC COMBINED SYSTOLIC AND DIASTOLIC CHF (CONGESTIVE HEART FAILURE) (HCC): ICD-10-CM

## 2025-06-03 RX ORDER — FUROSEMIDE 20 MG/1
40 TABLET ORAL DAILY
Qty: 60 TABLET | Refills: 5 | OUTPATIENT
Start: 2025-06-03

## 2025-06-03 RX ORDER — PREDNISONE 5 MG/1
5 TABLET ORAL 2 TIMES DAILY WITH MEALS
Qty: 60 TABLET | Refills: 5 | OUTPATIENT
Start: 2025-06-03

## 2025-06-10 DIAGNOSIS — I50.42 CHRONIC COMBINED SYSTOLIC AND DIASTOLIC CHF (CONGESTIVE HEART FAILURE) (HCC): ICD-10-CM

## 2025-06-10 RX ORDER — FUROSEMIDE 20 MG/1
40 TABLET ORAL DAILY
Qty: 60 TABLET | Refills: 5 | OUTPATIENT
Start: 2025-06-10

## 2025-06-17 DIAGNOSIS — I50.42 CHRONIC COMBINED SYSTOLIC AND DIASTOLIC CHF (CONGESTIVE HEART FAILURE) (HCC): ICD-10-CM

## 2025-06-17 RX ORDER — FUROSEMIDE 20 MG/1
40 TABLET ORAL DAILY
Qty: 60 TABLET | Refills: 5 | OUTPATIENT
Start: 2025-06-17

## 2025-06-24 DIAGNOSIS — I50.42 CHRONIC COMBINED SYSTOLIC AND DIASTOLIC CHF (CONGESTIVE HEART FAILURE) (HCC): ICD-10-CM

## 2025-06-24 RX ORDER — FUROSEMIDE 20 MG/1
40 TABLET ORAL DAILY
Qty: 60 TABLET | Refills: 5 | OUTPATIENT
Start: 2025-06-24

## (undated) DEVICE — OCCLUSIVE GAUZE STRIP,3% BISMUTH TRIBROMOPHENATE IN PETROLATUM BLEND: Brand: XEROFORM

## (undated) DEVICE — SUT PROLENE 5-0 C-1/C-1 36 IN 8720H

## (undated) DEVICE — 3M™ IOBAN™ 2 ANTIMICROBIAL INCISE DRAPE 6640EZ: Brand: IOBAN™ 2

## (undated) DEVICE — ANTIBACTERIAL UNDYED BRAIDED (POLYGLACTIN 910), SYNTHETIC ABSORBABLE SUTURE: Brand: COATED VICRYL

## (undated) DEVICE — GLOVE PI ULTRA TOUCH SZ.7.5

## (undated) DEVICE — ACE WRAP 4 IN UNSTERILE

## (undated) DEVICE — 3000CC GUARDIAN II: Brand: GUARDIAN

## (undated) DEVICE — INTENDED FOR TISSUE SEPARATION, AND OTHER PROCEDURES THAT REQUIRE A SHARP SURGICAL BLADE TO PUNCTURE OR CUT.: Brand: BARD-PARKER SAFETY BLADES SIZE 15, STERILE

## (undated) DEVICE — PETRI DISH STERILE

## (undated) DEVICE — SUT SILK 4-0 18 IN A183H

## (undated) DEVICE — GLOVE SRG BIOGEL 7.5

## (undated) DEVICE — TRAY FOLEY 16FR URIMETER SURESTEP

## (undated) DEVICE — BETHLEHEM MAJOR GENERAL PACK: Brand: CARDINAL HEALTH

## (undated) DEVICE — BETHLEHEM UNIV MAJ EXT ,KIT: Brand: CARDINAL HEALTH

## (undated) DEVICE — HI-TORQUE BALANCE MIDDLEWEIGHT GUIDE WIRE W/HYDROCOAT .014 J TIP 3.0 CM X 190 CM: Brand: HI-TORQUE BALANCE MIDDLEWEIGHT

## (undated) DEVICE — SUT MONOCRYL 3-0 SH 27 IN Y416H

## (undated) DEVICE — STERILE BETHLEHEM FEM POP PACK: Brand: CARDINAL HEALTH

## (undated) DEVICE — SPONGE LAP 18 X 18 IN STRL RFD

## (undated) DEVICE — 40601 PROLONGED POSITIONING SYSTEM: Brand: 40601 PROLONGED POSITIONING SYSTEM

## (undated) DEVICE — CHLORAPREP HI-LITE 26ML ORANGE

## (undated) DEVICE — Device

## (undated) DEVICE — SUT MONOCRYL 4-0 PS-2 18 IN Y496G

## (undated) DEVICE — Device: Brand: WEBSTER

## (undated) DEVICE — BULB SYRINGE,IRRIGATION WITH PROTECTIVE CAP: Brand: DOVER

## (undated) DEVICE — 2108 SERIES SAGITTAL BLADE (18.6 X 0.64 X 61.1MM)

## (undated) DEVICE — SUT SILK 3-0 SH CR/8 18 IN C013D

## (undated) DEVICE — INTENDED FOR TISSUE SEPARATION, AND OTHER PROCEDURES THAT REQUIRE A SHARP SURGICAL BLADE TO PUNCTURE OR CUT.: Brand: BARD-PARKER SAFETY BLADES SIZE 10, STERILE

## (undated) DEVICE — SURGICEL FIBRILLAR 1 X 2

## (undated) DEVICE — SUT PROLENE 6-0 C-1/C-1 30 IN 8706H

## (undated) DEVICE — GUIDE SHEATH 7FR 90 D ATTAIN SELECT II SUREVALVE

## (undated) DEVICE — PROXIMATE PLUS MD MULTI-DIRECTIONAL RELEASE SKIN STAPLERS CONTAINS 35 STAINLESS STEEL STAPLES APPROXIMATE CLOSED DIMENSIONS: 6.9MM X 3.9MM WIDE: Brand: PROXIMATE

## (undated) DEVICE — SLITTER ADJUSTABLE

## (undated) DEVICE — GUIDE SHEATH 9FR ATTAIN COMMAND 9FR EH CURVE

## (undated) DEVICE — SUT VICRYL 0 CT-1 27 IN J260H

## (undated) DEVICE — SUT SILK 2-0 18 IN A185H

## (undated) DEVICE — SLIM BODY SKIN STAPLER: Brand: APPOSE ULC

## (undated) DEVICE — ANTIBACTERIAL VIOLET BRAIDED (POLYGLACTIN 910), SYNTHETIC ABSORBABLE SUTURE: Brand: COATED VICRYL

## (undated) DEVICE — GAUZE SPONGES,16 PLY: Brand: CURITY

## (undated) DEVICE — INTRO SHEATH PEEL AWAY 9 FR

## (undated) DEVICE — INTRO SHEATH PEEL AWAY 7FR

## (undated) DEVICE — ADHESIVE SKIN HIGH VISCOSITY EXOFIN 1ML

## (undated) DEVICE — GLOVE INDICATOR PI UNDERGLOVE SZ 7.5 BLUE

## (undated) DEVICE — DRAPE SURGIKIT SADDLE BAG

## (undated) DEVICE — BASIC SINGLE BASIN 2-LF: Brand: MEDLINE INDUSTRIES, INC.

## (undated) DEVICE — SUT VICRYL 2-0 SH 27 IN UNDYED J417H

## (undated) DEVICE — SWAN-GANZ DOUBLE LUMEN MONITORING CATHETER: Brand: SWAN-GANZ

## (undated) DEVICE — REM POLYHESIVE ADULT PATIENT RETURN ELECTRODE: Brand: VALLEYLAB

## (undated) DEVICE — 1840 FOAM BLOCK NEEDLE COUNTER: Brand: DEVON

## (undated) DEVICE — ALL PURPOSE SPONGES,NONWOVEN, 4 PLY: Brand: CURITY

## (undated) DEVICE — PLUMEPEN PRO 10FT

## (undated) DEVICE — GLOVE INDICATOR PI UNDERGLOVE SZ 8 BLUE

## (undated) DEVICE — ABDOMINAL PAD: Brand: DERMACEA

## (undated) DEVICE — SUT ETHILON 2-0 FSLX 30 IN 1674H

## (undated) DEVICE — BAG DECANTER

## (undated) DEVICE — NEPTUNE E-SEP SMOKE EVACUATION PENCIL, COATED, 70MM BLADE, PUSH BUTTON SWITCH: Brand: NEPTUNE E-SEP

## (undated) DEVICE — PAD GROUNDING ADULT

## (undated) DEVICE — MEDI-VAC YANK SUCT HNDL W/TPRD BULBOUS TIP: Brand: CARDINAL HEALTH

## (undated) DEVICE — SUT VICRYL 2-0 REEL 54 IN J286G

## (undated) DEVICE — INTENDED FOR TISSUE SEPARATION, AND OTHER PROCEDURES THAT REQUIRE A SHARP SURGICAL BLADE TO PUNCTURE OR CUT.: Brand: BARD-PARKER ® CARBON RIB-BACK BLADES

## (undated) DEVICE — SUT SILK 3-0 18 IN A184H

## (undated) DEVICE — SUT VICRYL 0 REEL 54 IN J287G

## (undated) DEVICE — KERLIX BANDAGE ROLL: Brand: KERLIX

## (undated) DEVICE — 1200CC GUARDIAN II: Brand: GUARDIAN

## (undated) DEVICE — POOLE SUCTION HANDLE: Brand: CARDINAL HEALTH

## (undated) DEVICE — RUNTHROUGH NS EXTRA FLOPPY PTCA GUIDEWIRE: Brand: RUNTHROUGH

## (undated) DEVICE — ACE WRAP 6 IN UNSTERILE

## (undated) DEVICE — PAD GROUNDING DUAL ADULT

## (undated) DEVICE — LIGACLIP MCA MULTIPLE CLIP APPLIERS, 20 MEDIUM CLIPS: Brand: LIGACLIP